# Patient Record
Sex: MALE | Race: BLACK OR AFRICAN AMERICAN | NOT HISPANIC OR LATINO | Employment: OTHER | URBAN - METROPOLITAN AREA
[De-identification: names, ages, dates, MRNs, and addresses within clinical notes are randomized per-mention and may not be internally consistent; named-entity substitution may affect disease eponyms.]

---

## 2017-02-01 ENCOUNTER — HOSPITAL ENCOUNTER (EMERGENCY)
Facility: HOSPITAL | Age: 52
Discharge: HOME | End: 2017-02-01
Attending: EMERGENCY MEDICINE
Payer: MEDICAID

## 2017-02-01 VITALS
HEART RATE: 80 BPM | OXYGEN SATURATION: 95 % | DIASTOLIC BLOOD PRESSURE: 90 MMHG | TEMPERATURE: 99 F | HEIGHT: 69 IN | WEIGHT: 240 LBS | BODY MASS INDEX: 35.55 KG/M2 | RESPIRATION RATE: 20 BRPM | SYSTOLIC BLOOD PRESSURE: 137 MMHG

## 2017-02-01 DIAGNOSIS — M25.519 SHOULDER PAIN: ICD-10-CM
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
## 2017-02-01 DIAGNOSIS — M79.602 LEFT ARM PAIN: ICD-10-CM

## 2017-02-01 PROCEDURE — 99283 EMERGENCY DEPT VISIT LOW MDM: CPT

## 2017-02-01 PROCEDURE — 99285 EMERGENCY DEPT VISIT HI MDM: CPT | Mod: ,,, | Performed by: PHYSICIAN ASSISTANT

## 2017-02-01 PROCEDURE — 25000003 PHARM REV CODE 250: Performed by: PHYSICIAN ASSISTANT

## 2017-02-01 RX ORDER — TRAMADOL HYDROCHLORIDE 50 MG/1
50 TABLET ORAL
Status: COMPLETED | OUTPATIENT
Start: 2017-02-01 | End: 2017-02-01

## 2017-02-01 RX ADMIN — TRAMADOL HYDROCHLORIDE 50 MG: 50 TABLET, FILM COATED ORAL at 11:02

## 2017-02-01 NOTE — ED AVS SNAPSHOT
[facility]  [address] [facility] [address]               Hamlet Terrell   2017  9:43 PM   ED    Description:  Male : 1965   Department:  [facility]           Your Care was Coordinated By:     Provider Role From To    Luis Brooks MD Attending Provider 17 --    Sejal Ballard PA-C Physician Assistant 17 --      Reason for Visit     Joint Swelling           Diagnoses this Visit        Comments    Fall    -  Primary     Shoulder pain         Left arm pain           ED Disposition     ED Disposition Condition Comment    Discharge             To Do List           Follow-up Information     Schedule an appointment as soon as possible for a visit with Carlin Swift MD.    Specialty:  Family Medicine    Contact information:    [address]  [facility] [address]  [phone]          Schedule an appointment as soon as possible for a visit with Ajay Ambriz - Orthopedics.    Specialty:  Orthopedics    Contact information:    [address] [facility] [address] [phone]    Additional information:    [facility] - 5th Floor      [facility]     [facility] -  Assistance  Registered nurses in the [facility] provide clinical advisement, health education, appointment booking, and other advisory services.  Call for this free service at [phone].             Medications           Message regarding Medications     Verify the changes and/or additions to your medication regime listed below are the same as discussed with your clinician today.  If any of these changes or additions are incorrect, please notify your healthcare provider.        These medications were administered today        Dose Freq    tramadol tablet 50 mg 50 mg ED 1 Time    Sig: Take 1 tablet (50 mg total) by mouth ED 1 Time.    Class: Normal    Route: Oral    Cosign for Ordering:  "Required by Luis Brooks MD           Verify that the below list of medications is an accurate representation of the medications you are currently taking.  If none reported, the list may be blank. If incorrect, please contact your healthcare provider. Carry this list with you in case of emergency.           Current Medications     aspirin 81 MG Chew Take 1 tablet (81 mg total) by mouth once daily.    carvedilol (COREG) 25 MG tablet Take 0.5 tablets (12.5 mg total) by mouth 2 (two) times daily.    hydrALAZINE (APRESOLINE) 25 MG tablet Take 1 tablet (25 mg total) by mouth every 8 (eight) hours.    isosorbide dinitrate (ISORDIL) 20 MG tablet Take 1 tablet (20 mg total) by mouth 3 (three) times daily.    nitroGLYCERIN (NITROSTAT) 0.4 MG SL tablet Place 1 tablet (0.4 mg total) under the tongue every 5 (five) minutes as needed for Chest pain. Tablet, Sublingual Sublingual    potassium chloride SA (K-DUR,KLOR-CON) 20 MEQ tablet Take by mouth once daily. Patient reports taking 25 mg daily    ranolazine (RANEXA) 1,000 mg Tb12 Take 1 tablet (1,000 mg total) by mouth 2 (two) times daily.    senna-docusate 8.6-50 mg (PERICOLACE) 8.6-50 mg per tablet Take 1 tablet by mouth daily as needed for Constipation.    torsemide (DEMADEX) 20 MG Tab Take 2 tablets (40 mg total) by mouth once daily. Patient reports taking 20 mg TWICE DAILY    tramadol tablet 50 mg Take 1 tablet (50 mg total) by mouth ED 1 Time.    warfarin (COUMADIN) 7.5 MG tablet Take 1 tablet (7.5 mg total) by mouth Daily.           Clinical Reference Information           Your Vitals Were     BP Pulse Temp Resp Height Weight    137/90 (BP Location: Right arm, Patient Position: Sitting) 80 99.3 °F (37.4 °C) (Oral) 20 5' 9" (1.753 m) 108.9 kg (240 lb)    SpO2 BMI             95% 35.44 kg/m2         Allergies as of 2/1/2017        Reactions    Acetaminophen     Itching    Oxycodone-acetaminophen     Other reaction(s): Itching    Ace Inhibitors Other (See Comments)    " cough      Immunizations Administered on Date of Encounter - 2/1/2017     None      ED Micro, Lab, POCT     None      ED Imaging Orders     Start Ordered       Status Ordering Provider    02/01/17 2216 02/01/17 2215  X-Ray Shoulder Trauma Left  1 time imaging      Final result     02/01/17 2216 02/01/17 2215  X-Ray Humerus 2 View Left  1 time imaging      Final result     02/01/17 2216 02/01/17 2215  X-Ray Elbow Complete Left  1 time imaging      Final result     02/01/17 2216 02/01/17 2215  X-Ray Forearm Left  1 time imaging      Final result         Discharge Instructions       Follow up with your PCP and orthopedics. Ice the arm to help with the swelling. Return to the ED for any new or worsening symptoms, including those listed below.        Arthralgia    Arthralgia is the term for pain in or around the joint. It is a symptom, not a disease. This pain may involve one or more joints. In some cases, the pain moves from joint to joint.  There are many causes for joint pain. These include:  · Injury  · Osteoarthritis (wearing out of the joint surface)  · Gout (inflammation of the joint due to crystals in the joint fluid)  · Infection inside the joint    · Bursitis (inflammation of the fluid-filled sacs around the joint)  · Autoimmune disorders such as rheumatoid arthritis or lupus  · Tendonitis (inflamation of chords that attach muscle to bone)  Home care  · Rest the involved joint(s) until your symptoms improve.   · You may be prescribed pain medication. If none is prescribed, you may use acetaminophen or ibuprofen to control pain and inflammation.  Follow up  Follow up with your healthcare provider or our staff as advised.  When to seek medical care  Contact your healthcare provider right away if any of the following occurs:  · Pain, swelling, or redness of joint increases  · Pain worsens or recurs after a period of improvement  · Pain moves to other joints  · You cannot bear weight on the affected joint   · You  cannot move the affected joint  · Joint appears deformed  · New rash appears  · Fever of 101ºF (38.8ºC) or higher, or as directed by your healthcare provider  © 9263-9127 [employer]. [address] [place] [address], [place] [address]. All rights reserved. This information is not intended as a substitute for professional medical care. Always follow your healthcare professional's instructions.          Muscle Strain in the Extremities  A muscle strain is a stretching and tearing of muscle fibers. This causes pain, especially when you move that muscle. There may also be some swelling and bruising.  Home care  · Keep the hurt area raised to reduce pain and swelling. This is especially important during the first 48 hours.  · Apply an ice pack over the injured area for 15 to 20 minutes every 3 to 6 hours. You should do this for the first 24 to 48 hours. You can make an ice pack by filling a plastic bag that seals at the top with ice cubes and then wrapping it with a thin towel. Be careful not to injure your skin with the ice treatments. Ice should never be applied directly to skin. Continue the use of ice packs for relief of pain and swelling as needed. After 48 hours, apply heat (warm shower or warm bath) for 15 to 20 minutes several times a day, or alternate ice and heat.  · You may use over-the-counter pain medicine to control pain, unless another medicine was prescribed. If you have chronic liver or kidney disease or ever had a stomach ulcer or GI bleeding, talk with your healthcare provider before using these medicines.  · For leg strains: If crutches have been recommended, dont put full weight on the hurt leg until you can do so without pain. You can return to sports when you are able to hop and run on the injured leg without pain.  Follow-up care  Follow up with your healthcare provider, or as advised.  When to seek medical advice  Call your healthcare provider right away if any of these occur:  · The toes  of the injured leg become swollen, cold, blue, numb, or tingly  · Pain or swelling increases  © 6261-2951 [employer] [address] [place] [address], [place] [address]. All rights reserved. This information is not intended as a substitute for professional medical care. Always follow your healthcare professional's instructions.          [facility] Sign-Up     Activating your [facility] account is as easy as 1-2-3!     1) Visit [email or web address], select Sign Up Now, enter this activation code and your date of birth, then select Next.  [email or web address]  Expires: 3/18/2017 11:30 PM      2) Create a username and password to use when you visit [facility] in the future and select a security question in case you lose your password and select Next.    3) Enter your e-mail address and click Sign Up!    Additional Information  If you have questions, please e-mail [email or web address] or call [phone] to talk to our [facility] staff. Remember, [facility] is NOT to be used for urgent needs. For medical emergencies, dial 911.          [facility] complies with applicable Federal civil rights laws and does not discriminate on the basis of race, color, national origin, age, disability, or sex.        Language Assistance Services     ATTENTION: Language assistance services are available, free of charge. Please call [phone].      ATENCIÓN: Si habla nely, tiene a hernandez disposición servicios gratuitos de asistencia lingüística. Llame al [phone].     CHÚ Ý: N?u b?n nói Ti?ng Vi?t, có các d?ch v? h? tr? ngôn ng? mi?n phí dành cho b?n. G?i s? [phone].

## 2017-02-02 NOTE — ED PROVIDER NOTES
"Encounter Date: 2/1/2017       History     Chief Complaint   Patient presents with    Joint Swelling     Patient reports falling down and using his elbow to break his fall.     Review of patient's allergies indicates:   Allergen Reactions    Acetaminophen      Itching    Oxycodone-acetaminophen      Other reaction(s): Itching    Ace inhibitors Other (See Comments)     cough     HPI Comments: 51-year-old -American male with past medical history of hypertension, CHF, paroxysmal A. fib currently on Coumadin, stroke, CAD presents to the ED complaining of left arm and shoulder pain after a fall at 6:00 in the evening.  He was walking down the street when he tripped and fell striking his left elbow on a car and then onto the concrete.  He denies any head trauma or loss of consciousness.  He is complaining of a constant 10/10 "ice pick" pain that is worse with any movement of the arm.  He has not taken any pain medication at home because his currently out his at home oxycodone.  He denies fever, chills, chest pain, shortness breath, abdominal pain, nausea, vomiting, numbness, weakness, neck pain, back pain.  He denies tobacco, alcohol, or drug use.    The history is provided by the patient.     Past Medical History   Diagnosis Date    Anticoagulant long-term use     CHF (congestive heart failure)     Chronic combined systolic and diastolic congestive heart failure     Coronary artery disease     CVA (cerebrovascular accident)     Heart attack 2006    Hypertension     Hyperthyroidism, subclinical 1/2/2013    Paroxysmal atrial fibrillation     S/P ablation of atrial flutter 2008    Stroke 2009     no residual weaknesses     Past Medical History Pertinent Negatives   Diagnosis Date Noted    Difficult intubation 1/1/2013     Past Surgical History   Procedure Laterality Date    Radiofrequency ablation  01/08/2008     for atrial flutter     Family History   Problem Relation Age of Onset    Hypertension " Mother     Stroke Mother     Hypertension Father     Alcohol abuse Father     Hypertension Sister     Hypertension Brother      Social History   Substance Use Topics    Smoking status: Never Smoker    Smokeless tobacco: None    Alcohol use No     Review of Systems   Constitutional: Negative for chills and fever.   HENT: Negative for congestion, rhinorrhea and sore throat.    Eyes: Negative for photophobia and visual disturbance.   Respiratory: Negative for shortness of breath.    Cardiovascular: Negative for chest pain.   Gastrointestinal: Negative for abdominal pain, constipation, diarrhea, nausea and vomiting.   Genitourinary: Negative for dysuria and hematuria.   Musculoskeletal: Positive for arthralgias and myalgias. Negative for neck pain and neck stiffness.   Skin: Negative for rash and wound.   Neurological: Negative for dizziness, syncope, weakness, light-headedness, numbness and headaches.   Psychiatric/Behavioral: Negative for confusion.       Physical Exam   Initial Vitals   BP Pulse Resp Temp SpO2   02/01/17 2038 02/01/17 2038 02/01/17 2038 02/01/17 2038 02/01/17 2038   137/90 80 20 99.3 °F (37.4 °C) 95 %     Physical Exam    Nursing note and vitals reviewed.  Constitutional: He appears well-developed and well-nourished. He is not diaphoretic. No distress.   HENT:   Head: Normocephalic and atraumatic.   Neck: Normal range of motion. Neck supple.   Cardiovascular: Normal rate, regular rhythm and normal heart sounds. Exam reveals no gallop and no friction rub.    No murmur heard.  Pulmonary/Chest: Breath sounds normal. He has no wheezes. He has no rhonchi. He has no rales.   Abdominal: Soft. Bowel sounds are normal. There is no tenderness. There is no rebound and no guarding.   Musculoskeletal:        Left shoulder: He exhibits decreased range of motion, tenderness, bony tenderness and swelling. He exhibits no laceration.        Left elbow: He exhibits decreased range of motion and swelling. He  exhibits no deformity and no laceration. Tenderness found.        Left wrist: He exhibits normal range of motion, no tenderness and no bony tenderness.        Cervical back: He exhibits no tenderness and no bony tenderness.        Thoracic back: He exhibits no tenderness and no bony tenderness.        Lumbar back: He exhibits no tenderness and no bony tenderness.        Left upper arm: He exhibits tenderness and bony tenderness.        Left forearm: He exhibits no tenderness and no bony tenderness.        Left hand: He exhibits normal range of motion, no tenderness, no bony tenderness and normal capillary refill.   Severely decreased active and passive ROM of the L arm secondary to pain. L radial pulse 2+.    Neurological: He is alert and oriented to person, place, and time.   Skin: Skin is warm and dry. No rash noted. No erythema.   Psychiatric: He has a normal mood and affect.         ED Course   Procedures  Labs Reviewed - No data to display     Imaging Results         X-Ray Shoulder Trauma Left (Final result) Result time:  02/01/17 23:03:20    Final result by Kelvin Partida MD (02/01/17 23:03:20)    Impression:      No acute fracture.            History: .    LEFT humerus 2 views:    No fractures or dislocations.  Unremarkable visualized bony structures.      Impression:  No acute fracture.            History: .    LEFT elbow 3 views:    No fractures or dislocations. Enthesopathy at the posterior olecranon with some overlying soft tissue swelling. No fat pad elevation.    Impression:  No acute fracture.    Enthesopathy at the posterior olecranon with some overlying soft tissue swelling.            History: .    LEFT forearm 2 views:    No fractures or dislocations.  Unremarkable visualized bony structures.      Impression:  No acute fracture.      Electronically signed by: KELVIN PARTIDA MD  Date:     02/01/17  Time:    23:03     Narrative:    History: .    LEFT shoulder 3 views:    No fractures or  dislocations.  Unremarkable visualized bony structures.            X-Ray Humerus 2 View Left (Final result) Result time:  02/01/17 23:03:19    Final result by Kelvin Partida MD (02/01/17 23:03:19)    Impression:      No acute fracture.            History: .    LEFT humerus 2 views:    No fractures or dislocations.  Unremarkable visualized bony structures.      Impression:  No acute fracture.            History: .    LEFT elbow 3 views:    No fractures or dislocations. Enthesopathy at the posterior olecranon with some overlying soft tissue swelling. No fat pad elevation.    Impression:  No acute fracture.    Enthesopathy at the posterior olecranon with some overlying soft tissue swelling.            History: .    LEFT forearm 2 views:    No fractures or dislocations.  Unremarkable visualized bony structures.      Impression:  No acute fracture.      Electronically signed by: KELVIN PARTIDA MD  Date:     02/01/17  Time:    23:03     Narrative:    History: .    LEFT shoulder 3 views:    No fractures or dislocations.  Unremarkable visualized bony structures.            X-Ray Elbow Complete Left (Final result) Result time:  02/01/17 23:03:19    Final result by Kelvin Partida MD (02/01/17 23:03:19)    Impression:      No acute fracture.            History: .    LEFT humerus 2 views:    No fractures or dislocations.  Unremarkable visualized bony structures.      Impression:  No acute fracture.            History: .    LEFT elbow 3 views:    No fractures or dislocations. Enthesopathy at the posterior olecranon with some overlying soft tissue swelling. No fat pad elevation.    Impression:  No acute fracture.    Enthesopathy at the posterior olecranon with some overlying soft tissue swelling.            History: .    LEFT forearm 2 views:    No fractures or dislocations.  Unremarkable visualized bony structures.      Impression:  No acute fracture.      Electronically signed by: KELVIN PARTIDA MD  Date:      02/01/17  Time:    23:03     Narrative:    History: .    LEFT shoulder 3 views:    No fractures or dislocations.  Unremarkable visualized bony structures.            X-Ray Forearm Left (Final result) Result time:  02/01/17 23:03:19    Final result by Kelvin Partida MD (02/01/17 23:03:19)    Impression:      No acute fracture.            History: .    LEFT humerus 2 views:    No fractures or dislocations.  Unremarkable visualized bony structures.      Impression:  No acute fracture.            History: .    LEFT elbow 3 views:    No fractures or dislocations. Enthesopathy at the posterior olecranon with some overlying soft tissue swelling. No fat pad elevation.    Impression:  No acute fracture.    Enthesopathy at the posterior olecranon with some overlying soft tissue swelling.            History: .    LEFT forearm 2 views:    No fractures or dislocations.  Unremarkable visualized bony structures.      Impression:  No acute fracture.      Electronically signed by: KELVIN PARTIDA MD  Date:     02/01/17  Time:    23:03     Narrative:    History: .    LEFT shoulder 3 views:    No fractures or dislocations.  Unremarkable visualized bony structures.              X-Rays:   Independently Interpreted Readings:   Other Readings:  Left upper extremity x-rays negative for acute fracture.    Medical Decision Making:   History:   Old Medical Records: I decided to obtain old medical records.  Clinical Tests:   Radiological Study: Ordered and Reviewed       APC / Resident Notes:   51-year-old -American male with past medical history of hypertension, CHF, paroxysmal A. fib currently on Coumadin, stroke, CAD presents to the ED complaining of left arm and shoulder pain after a fall at 6:00 in the evening.  Vital signs stable.  Regular rate and rhythm without murmurs.  Lungs are clear to auscultation bilaterally without wheezes.  Abdomen is soft and nontender to palpation.  No midline C, T, or L-spine tenderness.   Severely decreased active and passive range of motion of the left arm secondary to pain.  Diffuse bony tenderness of the left shoulder, left humerus and left elbow noted.  No obvious deformities.  No lacerations.  Left radial pulse 2+.  Will obtain x-rays to rule out acute fracture or dislocation.    X-rays of the left shoulder, left humerus, left elbow, and left forearm do not show any acute fractures or dislocations.    Left arm placed in a sling prior to discharge.  I do not feel the patient needs any further labs or imaging at this time.  Stable for discharge.    He was discharged without any new prescriptions and will follow-up with his PCP for continuation of his chronic pain medication.  He was instructed to ice the arm as needed for pain and swelling.  All of the patient's questions were answered.  I reviewed the patient's chart and imaging and discussed the case with my supervising physician.            Attending Attestation:     Physician Attestation Statement for NP/PA:   I discussed this assessment and plan of this patient with the NP/PA, but I did not personally examine the patient. The face to face encounter was performed by the NP/PA.          Attending ED Notes:   51-year-old gentleman presents with acute left elbow pain after a fall.  Imaging reviewed.  There is no acute fracture.  Patient has chronic pain on oxycodone and states that he is also out of his oxycodone prescription.  He has been advised that this prescription will not be refilled.  He was given one Ultram in the emergency department.  Her committed ice and sling as needed.  Patient was advised to watch for evidence of joint swelling or worsening hematoma as he is on Coumadin.          ED Course     Clinical Impression:   The primary encounter diagnosis was Fall. Diagnoses of Shoulder pain and Left arm pain were also pertinent to this visit.    Disposition:   Disposition: Discharged  Condition: Stable       Sejal Ballard  [place]  02/02/17 0049

## 2017-02-02 NOTE — ED NOTES
Pt states that he tripped and fell. Hit left arm. Pt is able to move left arm and shoulder. Pt rates pain at 10/10.     Appearance: Pt awake, alert & oriented to person, place & time. Pt in no acute distress at present time.   Skin: Skin warm, dry & intact. Mucous membranes moist. Skin turgor normal.   Respiratory: Respirations even, non-labored.   Neurologic: Pt moving all extremities without difficulty. Sensation intact.   Peripheral Vascular: All peripheral pulses present.

## 2017-02-02 NOTE — DISCHARGE INSTRUCTIONS
Follow up with your PCP and orthopedics. Ice the arm to help with the swelling. Return to the ED for any new or worsening symptoms, including those listed below.        Arthralgia    Arthralgia is the term for pain in or around the joint. It is a symptom, not a disease. This pain may involve one or more joints. In some cases, the pain moves from joint to joint.  There are many causes for joint pain. These include:  · Injury  · Osteoarthritis (wearing out of the joint surface)  · Gout (inflammation of the joint due to crystals in the joint fluid)  · Infection inside the joint    · Bursitis (inflammation of the fluid-filled sacs around the joint)  · Autoimmune disorders such as rheumatoid arthritis or lupus  · Tendonitis (inflamation of chords that attach muscle to bone)  Home care  · Rest the involved joint(s) until your symptoms improve.   · You may be prescribed pain medication. If none is prescribed, you may use acetaminophen or ibuprofen to control pain and inflammation.  Follow up  Follow up with your healthcare provider or our staff as advised.  When to seek medical care  Contact your healthcare provider right away if any of the following occurs:  · Pain, swelling, or redness of joint increases  · Pain worsens or recurs after a period of improvement  · Pain moves to other joints  · You cannot bear weight on the affected joint   · You cannot move the affected joint  · Joint appears deformed  · New rash appears  · Fever of 101ºF (38.8ºC) or higher, or as directed by your healthcare provider  © 2931-5983 [employer]. [address] [place] [address], [place] [id number]. All rights reserved. This information is not intended as a substitute for professional medical care. Always follow your healthcare professional's instructions.          Muscle Strain in the Extremities  A muscle strain is a stretching and tearing of muscle fibers. This causes pain, especially when you move that muscle. There may also be some  swelling and bruising.  Home care  · Keep the hurt area raised to reduce pain and swelling. This is especially important during the first 48 hours.  · Apply an ice pack over the injured area for 15 to 20 minutes every 3 to 6 hours. You should do this for the first 24 to 48 hours. You can make an ice pack by filling a plastic bag that seals at the top with ice cubes and then wrapping it with a thin towel. Be careful not to injure your skin with the ice treatments. Ice should never be applied directly to skin. Continue the use of ice packs for relief of pain and swelling as needed. After 48 hours, apply heat (warm shower or warm bath) for 15 to 20 minutes several times a day, or alternate ice and heat.  · You may use over-the-counter pain medicine to control pain, unless another medicine was prescribed. If you have chronic liver or kidney disease or ever had a stomach ulcer or GI bleeding, talk with your healthcare provider before using these medicines.  · For leg strains: If crutches have been recommended, dont put full weight on the hurt leg until you can do so without pain. You can return to sports when you are able to hop and run on the injured leg without pain.  Follow-up care  Follow up with your healthcare provider, or as advised.  When to seek medical advice  Call your healthcare provider right away if any of these occur:  · The toes of the injured leg become swollen, cold, blue, numb, or tingly  · Pain or swelling increases  © 3011-4324 [employer]. [address] [place] [address], [place] [address]. All rights reserved. This information is not intended as a substitute for professional medical care. Always follow your healthcare professional's instructions.

## 2017-04-01 ENCOUNTER — HOSPITAL ENCOUNTER (OUTPATIENT)
Facility: HOSPITAL | Age: 52
Discharge: HOME | DRG: 291 | End: 2017-04-06
Attending: EMERGENCY MEDICINE | Admitting: HOSPITALIST
Payer: MEDICAID

## 2017-04-01 DIAGNOSIS — I48.20 ATRIAL FIBRILLATION, CHRONIC: Chronic | ICD-10-CM

## 2017-04-01 DIAGNOSIS — I50.23 ACUTE ON CHRONIC SYSTOLIC CHF (CONGESTIVE HEART FAILURE): ICD-10-CM

## 2017-04-01 DIAGNOSIS — I50.9 CHRONIC HEART FAILURE, UNSPECIFIED HEART FAILURE TYPE: ICD-10-CM

## 2017-04-01 DIAGNOSIS — E80.6 HYPERBILIRUBINEMIA: ICD-10-CM

## 2017-04-01 DIAGNOSIS — R09.89 PULMONARY CONGESTION: ICD-10-CM

## 2017-04-01 DIAGNOSIS — M79.672 HEEL PAIN, BILATERAL: Chronic | ICD-10-CM

## 2017-04-01 DIAGNOSIS — I50.9 CHF (CONGESTIVE HEART FAILURE): ICD-10-CM

## 2017-04-01 DIAGNOSIS — M79.89 LEG SWELLING: Primary | ICD-10-CM

## 2017-04-01 DIAGNOSIS — M79.671 HEEL PAIN, BILATERAL: Chronic | ICD-10-CM

## 2017-04-01 DIAGNOSIS — M79.672 LEFT FOOT PAIN: ICD-10-CM

## 2017-04-01 LAB
ALBUMIN SERPL BCP-MCNC: 3.9 G/DL
ALP SERPL-CCNC: 152 U/L
ALT SERPL W/O P-5'-P-CCNC: 11 U/L
ANION GAP SERPL CALC-SCNC: 13 MMOL/L
AST SERPL-CCNC: 21 U/L
BASOPHILS # BLD AUTO: 0.05 K/UL
BASOPHILS NFR BLD: 0.6 %
BILIRUB SERPL-MCNC: 2.2 MG/DL
BNP SERPL-MCNC: 306 PG/ML
BUN SERPL-MCNC: 19 MG/DL
CALCIUM SERPL-MCNC: 9.9 MG/DL
CHLORIDE SERPL-SCNC: 97 MMOL/L
CO2 SERPL-SCNC: 32 MMOL/L
CREAT SERPL-MCNC: 1.5 MG/DL
DIFFERENTIAL METHOD: ABNORMAL
EOSINOPHIL # BLD AUTO: 0.3 K/UL
EOSINOPHIL NFR BLD: 4.4 %
ERYTHROCYTE [DISTWIDTH] IN BLOOD BY AUTOMATED COUNT: 13.1 %
EST. GFR  (AFRICAN AMERICAN): >60 ML/MIN/1.73 M^2
EST. GFR  (NON AFRICAN AMERICAN): 53.5 ML/MIN/1.73 M^2
GLUCOSE SERPL-MCNC: 95 MG/DL
HCT VFR BLD AUTO: 38 %
HGB BLD-MCNC: 12.4 G/DL
INR PPP: 1.3
LYMPHOCYTES # BLD AUTO: 1.6 K/UL
LYMPHOCYTES NFR BLD: 20.6 %
MCH RBC QN AUTO: 28.1 PG
MCHC RBC AUTO-ENTMCNC: 32.6 %
MCV RBC AUTO: 86 FL
MONOCYTES # BLD AUTO: 1.2 K/UL
MONOCYTES NFR BLD: 15.6 %
NEUTROPHILS # BLD AUTO: 4.6 K/UL
NEUTROPHILS NFR BLD: 58.5 %
PLATELET # BLD AUTO: 237 K/UL
PMV BLD AUTO: 9.9 FL
POTASSIUM SERPL-SCNC: 2.9 MMOL/L
PROT SERPL-MCNC: 9 G/DL
PROTHROMBIN TIME: 13.6 SEC
RBC # BLD AUTO: 4.42 M/UL
SODIUM SERPL-SCNC: 142 MMOL/L
TROPONIN I SERPL DL<=0.01 NG/ML-MCNC: 0.09 NG/ML
WBC # BLD AUTO: 7.78 K/UL

## 2017-04-01 PROCEDURE — 63600175 PHARM REV CODE 636 W HCPCS: Performed by: EMERGENCY MEDICINE

## 2017-04-01 PROCEDURE — 80053 COMPREHEN METABOLIC PANEL: CPT

## 2017-04-01 PROCEDURE — 99285 EMERGENCY DEPT VISIT HI MDM: CPT | Mod: 25

## 2017-04-01 PROCEDURE — 85610 PROTHROMBIN TIME: CPT

## 2017-04-01 PROCEDURE — 85025 COMPLETE CBC W/AUTO DIFF WBC: CPT

## 2017-04-01 PROCEDURE — G0378 HOSPITAL OBSERVATION PER HR: HCPCS

## 2017-04-01 PROCEDURE — 84484 ASSAY OF TROPONIN QUANT: CPT

## 2017-04-01 PROCEDURE — 83880 ASSAY OF NATRIURETIC PEPTIDE: CPT

## 2017-04-01 PROCEDURE — 93005 ELECTROCARDIOGRAM TRACING: CPT

## 2017-04-01 PROCEDURE — 96374 THER/PROPH/DIAG INJ IV PUSH: CPT

## 2017-04-01 PROCEDURE — 25000003 PHARM REV CODE 250: Performed by: EMERGENCY MEDICINE

## 2017-04-01 PROCEDURE — 93010 ELECTROCARDIOGRAM REPORT: CPT | Mod: ,,, | Performed by: INTERNAL MEDICINE

## 2017-04-01 PROCEDURE — 11000001 HC ACUTE MED/SURG PRIVATE ROOM

## 2017-04-01 PROCEDURE — 99285 EMERGENCY DEPT VISIT HI MDM: CPT | Mod: ,,, | Performed by: EMERGENCY MEDICINE

## 2017-04-01 PROCEDURE — 87040 BLOOD CULTURE FOR BACTERIA: CPT

## 2017-04-01 RX ORDER — NAPROXEN 500 MG/1
500 TABLET ORAL
Status: DISCONTINUED | OUTPATIENT
Start: 2017-04-01 | End: 2017-04-01

## 2017-04-01 RX ORDER — FUROSEMIDE 10 MG/ML
40 INJECTION INTRAMUSCULAR; INTRAVENOUS
Status: COMPLETED | OUTPATIENT
Start: 2017-04-01 | End: 2017-04-01

## 2017-04-01 RX ORDER — TRAMADOL HYDROCHLORIDE 50 MG/1
50 TABLET ORAL
Status: COMPLETED | OUTPATIENT
Start: 2017-04-01 | End: 2017-04-01

## 2017-04-01 RX ADMIN — FUROSEMIDE 40 MG: 10 INJECTION, SOLUTION INTRAMUSCULAR; INTRAVENOUS at 11:04

## 2017-04-01 RX ADMIN — TRAMADOL HYDROCHLORIDE 50 MG: 50 TABLET, COATED ORAL at 11:04

## 2017-04-01 NOTE — IP AVS SNAPSHOT
[facility]  [address]  [place] [address]  Phone: [phone]           Patient Discharge Instructions     Our goal is to set you up for success. This packet includes information on your condition, medications, and your home care. It will help you to care for yourself so you don't get sicker and need to go back to the hospital.     Please ask your nurse if you have any questions.        There are many details to remember when preparing to leave the hospital. Here is what you will need to do:    1. Take your medicine. If you are prescribed medications, review your Medication List in the following pages. You may have new medications to  at the pharmacy and others that you'll need to stop taking. Review the instructions for how and when to take your medications. Talk with your doctor or nurses if you are unsure of what to do.     2. Go to your follow-up appointments. Specific follow-up information is listed in the following pages. Your may be contacted by a transition nurse or clinical provider about future appointments. Be sure we have all of the phone numbers to reach you, if needed. Please contact your provider's office if you are unable to make an appointment.     3. Watch for warning signs. Your doctor or nurse will give you detailed warning signs to watch for and when to call for assistance. These instructions may also include educational information about your condition. If you experience any of warning signs to your health, call your doctor.           [facility]  Unless otherwise directed by your provider, please   contact [facility], our nurse care line   that is available for 24/7 assistance.     [phone] (toll-free)     Registered nurses in the [facility]   provide: appointment scheduling, clinical advisement, health education, and other advisory services.                  ** Verify the list of medication(s) below is accurate and  up to date. Carry this with you in case of emergency. If your medications have changed, please notify your healthcare provider.             Medication List      START taking these medications        Additional Info                      gabapentin 400 MG capsule   Commonly known as:  NEURONTIN   Quantity:  60 capsule   Refills:  2   Dose:  400 mg    Last time this was given:  400 mg on 4/6/2017 11:40 AM   Instructions:  Take 1 capsule (400 mg total) by mouth 2 (two) times daily.     Begin Date    AM    Noon    PM    Bedtime       losartan 25 MG tablet   Commonly known as:  COZAAR   Quantity:  15 tablet   Refills:  2   Dose:  12.5 mg    Last time this was given:  12.5 mg on 4/6/2017 11:42 AM   Instructions:  Take 0.5 tablets (12.5 mg total) by mouth once daily.     Begin Date    AM    Noon    PM    Bedtime       methylPREDNISolone 4 mg tablet   Commonly known as:  MEDROL DOSEPACK   Quantity:  1 Package   Refills:  0   Indications:  Bursitis    Instructions:  use as directed     Begin Date    AM    Noon    PM    Bedtime         CHANGE how you take these medications        Additional Info                      torsemide 20 MG Tab   Commonly known as:  DEMADEX   Quantity:  120 tablet   Refills:  2   Dose:  40 mg   What changed:    - when to take this  - additional instructions    Last time this was given:  40 mg on 4/6/2017 11:46 AM   Instructions:  Take 2 tablets (40 mg total) by mouth 2 (two) times daily.     Begin Date    AM    Noon    PM    Bedtime       warfarin 7.5 MG tablet   Commonly known as:  COUMADIN   Quantity:  45 tablet   Refills:  0   Dose:  12.5 mg   What changed:  how much to take    Last time this was given:  10 mg on 4/4/2017  6:00 PM   Instructions:  Take 1.5 tablets (11.25 mg total) by mouth Daily.     Begin Date    AM    Noon    PM    Bedtime         CONTINUE taking these medications        Additional Info                      aspirin 81 MG Chew   Refills:  0   Dose:  81 mg    Last time this was  given:  81 mg on 4/6/2017 11:40 AM   Instructions:  Take 1 tablet (81 mg total) by mouth once daily.     Begin Date    AM    Noon    PM    Bedtime       carvedilol 25 MG tablet   Commonly known as:  COREG   Quantity:  60 tablet   Refills:  1   Dose:  12.5 mg    Last time this was given:  12.5 mg on 4/6/2017 11:40 AM   Instructions:  Take 0.5 tablets (12.5 mg total) by mouth 2 (two) times daily.     Begin Date    AM    Noon    PM    Bedtime       hydrALAZINE 25 MG tablet   Commonly known as:  APRESOLINE   Quantity:  270 tablet   Refills:  3   Dose:  25 mg    Last time this was given:  25 mg on 4/6/2017  3:00 PM   Instructions:  Take 1 tablet (25 mg total) by mouth every 8 (eight) hours.     Begin Date    AM    Noon    PM    Bedtime       isosorbide dinitrate 20 MG tablet   Commonly known as:  ISORDIL   Quantity:  270 tablet   Refills:  3   Dose:  20 mg    Last time this was given:  20 mg on 4/6/2017  3:00 PM   Instructions:  Take 1 tablet (20 mg total) by mouth 3 (three) times daily.     Begin Date    AM    Noon    PM    Bedtime       nitroGLYCERIN 0.4 MG SL tablet   Commonly known as:  NITROSTAT   Quantity:  30 tablet   Refills:  11   Dose:  0.4 mg    Instructions:  Place 1 tablet (0.4 mg total) under the tongue every 5 (five) minutes as needed for Chest pain. Tablet, Sublingual Sublingual     Begin Date    AM    Noon    PM    Bedtime       potassium chloride SA 20 MEQ tablet   Commonly known as:  K-DUR,KLOR-CON   Refills:  0    Instructions:  Take by mouth once daily. Patient reports taking 25 mg daily     Begin Date    AM    Noon    PM    Bedtime            Where to Get Your Medications      These medications were sent to [facility] [address] [place] [address] AT [address] [address]    Hours:  24-hours Phone:  [phone]     gabapentin 400 MG capsule    losartan 25 MG tablet    methylPREDNISolone 4 mg tablet    torsemide  20 MG Tab    warfarin 7.5 MG tablet                  Please bring to all follow up appointments:    1. A copy of your discharge instructions.  2. All medicines you are currently taking in their original bottles.  3. Identification and insurance card.    Please arrive 15 minutes ahead of scheduled appointment time.    Please call 24 hours in advance if you must reschedule your appointment and/or time.        Follow-up Information     Follow up with [facility].    Specialty:  Emergency Medicine    Why:  If symptoms worsen    Contact information:    [address] [place] [address] [phone]        Schedule an appointment as soon as possible for a visit with Carlin Swift MD.    Specialty:  Family Medicine    Contact information:    [address] [facility] [address] [phone]          Schedule an appointment as soon as possible for a visit with Ajay Ambriz - Cardiology.    Specialty:  Cardiology    Contact information:    [address] [place] [address] [phone]    Additional information:    3rd floor        Schedule an appointment as soon as possible for a visit with Ajay Ambriz - Wound Care.    Specialty:  Wound Care    Contact information:    [address] [place] [address] [phone]    Additional information:    8th Floor        Follow up with Carlin Swift MD In 1 week.    Specialty:  Family Medicine    Why:  Please see your Primary care in 1 week for f/u and check of your INR and hospital follow up    Contact information:    [address] [facility] [address] [phone]        Referrals     Future Orders    Ambulatory consult to Family Practice     Ambulatory Referral to Cardiology     Ambulatory Referral to Podiatry     Ambulatory Referral to Transplant, Heart     Ambulatory Referral to Transplant, Heart         Discharge Instructions     Future Orders    Call MD for:  difficulty  breathing or increased cough     Call MD for:  increased confusion or weakness     Call MD for:  persistent dizziness, light-headedness, or visual disturbances     Call MD for:  persistent nausea and vomiting or diarrhea     Call MD for:  redness, tenderness, or signs of infection (pain, swelling, redness, odor or green/yellow discharge around incision site)     Call MD for:  severe persistent headache     Call MD for:  severe uncontrolled pain     Call MD for:  temperature >100.4     Call MD for:  worsening rash     Diet general     Questions:    Total calories:      Fat restriction, if any:      Protein restriction, if any:      Na restriction, if any:      Fluid restriction:      Additional restrictions:          Discharge Instructions       1. For you acute on chronic heart failure exacerbation you will be discharged with a new dose of torsemide 40mg by mouth twice a day    2. For your heel pain, you were seen by the podiatry doctors, you had an MRI that did not reveal structural damage or abnormality.  You will receive a new prescription Gabapentin for chronic foot/heel pain.  Podiatry to provide left walking boot.   Also they recommended a Medrol Dosepack to treat a left heel bursitis                Leg Swelling in Both Legs    Swelling of the feet, ankles, and legs is called edema. It is caused by excess fluid that has collected in the tissues. Extra fluid in the body settles in the lowest part because of gravity. This is why the legs and feet are most affected.  Some of the causes for edema include:  · Disease of the heart like congestive heart failure  · Standing or sitting for long periods of time  · Infection of the feet or legs  · Blood pooling in the veins of your legs (venous insufficiency)  · Dilated veins in your lower leg (varicose veins)  · Garters or other clothing that is tight on your legs. This will cause blood to pool in your legs because the clothing limits blood flow.  · Some medicines such  as hormones like birth control pills, some blood pressure medicines like calcium channel blockers (amlodipine) and steroids, some antidepressants like MAO inhibitors and tricyclics  · Menstrual periods that cause you to retain fluids  · Many types of renal disease  · Liver failure or cirrhosis  · Pregnancy, some swelling is normal, but a sudden increase in leg swelling or weight gain can be a sign of a dangerous complication of pregnancy  · Poor nutrition  · Thyroid disease  Medical treatment will depend on what is causing the swelling in your legs. Your healthcare provider may prescribe water pills (diuretics) to get rid of the extra fluid.  Home care  Follow these guidelines when caring for yourself at home:  · Don't wear clothing like garters that is tight on your legs.  · Keep your legs up while lying or sitting.  · If infection, injury, or recent surgery is causing the swelling, stay off your legs as much as possible until symptoms get better.  · If your healthcare provider says that your leg swelling is caused by venous insufficiency or varicose veins, don't sit or  one place for long periods of time. Take breaks and walk about every few hours. Brisk walking is a good exercise. It helps circulate the blood that has collected in your leg. Talk with your provider about using support stockings to stop daytime leg swelling.  · If your provider says that heart disease is causing your leg swelling, follow a low-salt diet to stop extra fluid from staying in your body. You may also need medicine.  Follow-up care  Follow up with your healthcare provider, or as advised.  When to seek medical advice  Call your healthcare provider right away if any of these occur:  · New shortness of breath or chest pain  · Shortness of breath or chest pain that gets worse  · Swelling in both legs or ankles that gets worse  · Swelling of the abdomen  · Redness, warmth, or swelling in one leg  · Fever of 100.4ºF (38ºC) or higher,  or as directed by your healthcare provider  · Yellow color to your skin or eyes  · Rapid, unexplained weight gain  · Having to sleep upright or use an increased number of pillows  Date Last Reviewed: 3/31/2016  © 0671-2485 [employer]. [address] [place] [address], [place] [address]. All rights reserved. This information is not intended as a substitute for professional medical care. Always follow your healthcare professional's instructions.          Heart Failure: Warning Signs of a Flare-Up  You have a condition called heart failure. Once you have heart failure, flare-ups can happen. Below are signs that can mean your heart failure is getting worse. If you notice any of these warning signs, call your healthcare provider.  Swelling    · Your feet, ankles, or lower legs get puffier.  · You notice skin changes on your lower legs.  · Your shoes feel too tight.  · Your clothes are tighter in the waist.  · You have trouble getting rings on or off your fingers.  Shortness of breath  · You have to breathe harder even when youre doing your normal activities or when youre resting.  · You are short of breath walking up stairs or even short distances.  · You wake up at night short of breath or coughing.  · You need to use more pillows or sit up to sleep.  · You wake up tired or restless.  Other warning signs  · You feel weaker, dizzy, or more tired.  · You have chest pain or changes in your heartbeat.  · You have a cough that wont go away.  · You cant remember things or dont feel like eating.  Tracking your weight  Gaining weight is often the first warning sign that heart failure is getting worse. Gaining even a few pounds can be a sign that your body is retaining excess water and salt. Weighing yourself each day in the morning after you urinate and before you eat, is the best way to know if you're retaining water. Get a scale that is easy to read and make sure you wear the same clothes and use the same scale  "every time you weigh. Your healthcare provider will show you how to track your weight. Call your doctor if you gain more than 2 pounds in 1 day, 5 pounds in 1 week, or whatever weight gain you were told to report by your doctor. This is often a sign of worsening heart failure and needs to be evaluated and treated before it compromises your breathing. Your doctor will tell you what to do next.    Date Last Reviewed: 3/15/2016  © 5470-1126 [employer]. [address] [place] [address]. All rights reserved. This information is not intended as a substitute for professional medical care. Always follow your healthcare professional's instructions.          Primary Diagnosis     Your primary diagnosis was:  Heart Failure      Admission Information     Date & Time Provider Department CSN    4/1/2017  8:41 PM José Luis Langford MD [facility] [id number]       Admisson Diagnosis: Pulmonary congestion, Hyperbilirubinemia, CHF (congestive heart failure), Leg swelling, Chronic heart failure, unspecified heart failure type, Leg swelling      Care Providers     Provider Role Specialty Primary office phone    José Luis Langford MD Attending Provider Hospitalist [phone]    José Luis Langford MD Team Attending  Hospitalist [phone]    Aline George MD Team Attending  Hospitalist [phone]      Your Vitals Were     BP Pulse Temp Resp Height Weight    138/79 (BP Location: Left arm, Patient Position: Sitting, BP Method: Automatic) 90 98.1 °F (36.7 °C) (Oral) 18 5' 9" (1.753 m) 108.4 kg (239 lb 1.4 oz)    SpO2 BMI             95% 35.31 kg/m2         Recent Lab Values        2/11/2007 10/17/2015                        3:25 AM  3:22 AM          A1C 6.3 (H) 6.4 (H)                     Pending Labs     Order Current Status    Blood Culture #1 **CANNOT BE ORDERED STAT** Preliminary result      Allergies as of 4/6/2017        Reactions    Acetaminophen     Itching    " Oxycodone-acetaminophen     Other reaction(s): Itching    Ace Inhibitors Other (See Comments)    cough      Advance Directives     An advance directive is a document which, in the event you are no longer able to make decisions for yourself, tells your healthcare team what kind of treatment you do or do not want to receive, or who you would like to make those decisions for you.  If you do not currently have an advance directive, [facility] encourages you to create one.  For more information call:  [phone]), [phone] ([phone]), or log on to [email or web address].        Translation Services Information     ATTENTION: Language assistance services are available, free of charge. Please call [phone].    ATENCIÓN: Si habla nely, tiene a hernandez disposición servicios gratuitos de asistencia lingüística. Llame al [phone].     CHÚ Ý: N?u b?n nói Ti?ng Vi?t, có các d?ch v? h? tr? ngôn ng? mi?n phí dành cho b?n. G?i s? [phone].        Heart Failure Education       Heart Failure: Being Active  You have a condition called heart failure. Being active doesnt mean that you have to wear yourself out. Even a little movement each day helps to strengthen your heart. If you cant get out to exercise, you can do simple stretching and strengthening exercises at home. These are good ways to keep you well-conditioned and prevent you and your heart from becoming excessively weak.    Ideas to get you started  · Add a little movement to things you do now. Walk to mail letters. Park your car at the far end of the parking lot and walk to the store. Walk up a flight of stairs instead of taking the elevator.  · Choose activities you enjoy. You might walk, swim, or ride an exercise bike. Things like gardening and washing the car count, too. Other possibilities include: washing dishes, walking the dog, walking around the mall, and doing aerobic activities with friends.  · Join a group exercise program  at a [facility] or [facility], a senior center, or a community center. Or look into a hospital cardiac rehabilitation program. Ask your doctor if you qualify.  Tips to keep you going  · Get up and get dressed each day. Go to a coffee shop and read a newspaper or go somewhere that you'll be in the presence of other active people. Youll feel more like being active.  · Make a plan. Choose one or more activities that you enjoy and that you can easily do. Then plan to do at least one each day. You might write your plan on a calendar.  · Go with a friend or a group if you like company. This can help you feel supported and stay motivated, too.  · Plan social events that you enjoy. This will keep you mentally engaged as well as physically motivated to do things you find pleasure in.  For your safety  · Talk with your healthcare provider before starting an exercise program.  · Exercise indoors when its too hot or too cold outside, or when the air quality is poor. Try walking at a shopping mall.  · Wear socks and sturdy shoes to maintain your balance and prevent falls.  · Start slowly. Do a few minutes several times a day at first. Increase your time and speed little by little.  · Stop and rest whenever you feel tired or get short of breath.  · Dont push yourself on days when you dont feel well.  Date Last Reviewed: 3/20/2016  © 9878-7714 [employer]. [address] [place] [address], [place] [address]. All rights reserved. This information is not intended as a substitute for professional medical care. Always follow your healthcare professional's instructions.              Heart Failure: Evaluating Your Heart  You have a condition called heart failure. To evaluate your condition, your doctor will examine you, ask questions, and do some tests. Along with looking for signs of heart failure, the doctor looks for any other health problems that may have led to heart failure. The results of your evaluation will help your doctor form a  treatment plan.  Health history and physical exam  Your visit will start with a health history. Tell the doctor about any symptoms youve noticed and about all medicines you take. Then youll have a physical exam. This includes listening to your heartbeat and breathing. Youll also be checked for swelling (edema) in your legs and neck. When you have fluid buildup or fluid in the lungs, it may be called congestive heart failure.  Diagnosing heart failure     During an echocardiogram, sound waves bounce off the heart. These are converted into a picture on the screen.   The following may be done to help your doctor form a diagnosis:  · X-rays show the size and shape of your heart. These pictures can also show fluid in your lungs.  · An electrocardiogram (ECG or EKG) shows the pattern of your heartbeat. Small pads (electrodes) are placed on your chest, arms, and legs. Wires connect the pads to the ECG machine, which records your hearts electrical signals. This can give the doctor information about heart function.  · An echocardiogram uses ultrasound waves to show the structure and movement of your heart muscle. This shows how well the heart pumps. It also shows the thickness of the heart walls, and if the heart is enlarged. It is one of the most useful, non-invasive tests as it provides information about the heart's general function. This helps your doctor make treatment decisions.  · Lab tests evaluate small amounts of blood or urine for signs of problems. A BNP lab test can help diagnose and evaluate heart failure. BNP stands for B-type natriuretic peptide. The ventricles secrete more BNP when heart failure worsens. Lab tests can also provide information about metabolic dysfunction or heart dysfunction.  Your treatment plan  Based on the results of your evaluation and tests, your doctor will develop a treatment plan. This plan is designed to relieve some of your heart failure symptoms and help make you more  comfortable. Your treatment plan may include:  · Medicine to help your heart work better and improve your quality of life  · Changes in what you eat and drink to help prevent fluid from backing up in your body  · Daily monitoring of your weight and heart failure symptoms to see how well your treatment plan is working  · Exercise to help you stay healthy  · Help with quitting smoking  · Emotional and psychological support to help adjust to the changes  · Referrals to other specialists to make sure you are being treated comprehensively  Date Last Reviewed: 3/21/2016  © 2774-5339 [employer]. [address] [place] [address]. All rights reserved. This information is not intended as a substitute for professional medical care. Always follow your healthcare professional's instructions.              Heart Failure: Making Changes to Your Diet  You have a condition called heart failure. When you have heart failure, excess fluid is more likely to build up in your body because your heart isn't working well. This makes the heart work harder to pump blood. Fluid buildup causes symptoms such as shortness of breath and swelling (edema). This is often referred to as congestive heart failure or CHF. Controlling the amount of salt (sodium) you eat may help stop fluid from building up. Your doctor may also tell you to reduce the amount of fluid you drink.  Reading food labels    Your healthcare provider will tell you how much sodium you can eat each day. Read food labels to keep track. Keep in mind that certain foods are high in salt. These include canned, frozen, and processed foods. Check the amount of sodium in each serving. Watch out for high-sodium ingredients. These include MSG (monosodium glutamate), baking soda, and sodium phosphate.   Eating less salt  Give yourself time to get used to eating less salt. It may take a little while. Here are some tips to help:  · Take the saltshaker off the table. Replace  it with salt-free herb mixes and spices.  · Eat fresh or plain frozen vegetables. These have much less salt than canned vegetables.  · Choose low-sodium snacks like sodium-free pretzels, crackers, or air-popped popcorn.  · Dont add salt to your food when youre cooking. Instead, season your foods with pepper, lemon, garlic, or onion.  · When you eat out, ask that your food be cooked without added salt.  · Avoid eating fried foods as these often have a great deal of salt.  If youre told to limit fluids  You may need to limit how much fluid you have to help prevent swelling. This includes anything that is liquid at room temperature, such as ice cream and soup. If your doctor tells you to limit fluid, try these tips:  · Measure drinks in a measuring cup before you drink them. This will help you meet daily goals.  · Chill drinks to make them more refreshing.  · Suck on frozen lemon wedges to quench thirst.  · Only drink when youre thirsty.  · Chew sugarless gum or suck on hard candy to keep your mouth moist.  · Weigh yourself daily to know if your body's fluid content is rising.  My sodium goal  Your healthcare provider may give you a sodium goal to meet each day. This includes sodium found in food as well as salt that you add. My goal is to eat no more than ___________ mg of sodium per day.     When to call your doctor  Call your doctor right away if you have any symptoms of worsening heart failure. These can include:  · Sudden weight gain  · Increased swelling of your legs or ankles  · Trouble breathing when youre resting or at night  · Increase in the number of pillows you have to sleep on  · Chest pain, pressure, discomfort, or pain in the jaw, neck, or back   Date Last Reviewed: 3/21/2016  © 7686-8828 [employer]. [address] [place] [address], [place] [address]. All rights reserved. This information is not intended as a substitute for professional medical care. Always follow your healthcare  professional's instructions.              Heart Failure: Medicines to Help Your Heart    You have a condition called heart failure (also known as congestive heart failure, or CHF). Your doctor will likely prescribe medicines for heart failure and any underlying health problems you have. Most heart failure patients take one or more types of medicinen. Your healthcare provider will work to find the combination of medicines that works best for you.  Heart failure medicines  Here are the most common heart failure medicines:  · ACE inhibitors lower blood pressure and decrease strain on the heart. This makes it easier for the heart to pump. Angiotensin receptor blockers have similar effects. These are prescribed for some patients instead of ACE inhibitors.  · Beta-blockers relieve stress on the heart. They also improve symptoms. They may also improve the heart's pumping action over time.  · Diuretics (also called water pills) help rid your body of excess water. This can help rid your body of swelling (edema). Having less fluid to pump means your heart doesnt have to work as hard. Some diuretics make your body lose a mineral called potassium. Your doctor will tell you if you need to take supplements or eat more foods high in potassium.  · Digoxin helps your heart pump with more strength. This helps your heart pump more blood with each beat. So, more oxygen-rich blood travels to the rest of the body.  · Aldosterone antagonists help alter hormones and decrease strain on the heart.  · Hydralazine and nitrates are two separate medicines used together to treat heart failure. They may come in one combination pill. They lower blood pressure and decrease how hard the heart has to pump.  Medicines for related conditions  Controlling other heart problems helps keep heart failure under control, too. Depending on other heart problems you have, medicines may be prescribed to:  · Lower blood pressure (antihypertensives).  · Lower  cholesterol levels (statins).  · Prevent blood clots (anticoagulants or aspirin).  · Keep the heartbeat steady (antiarrhythmics).  Date Last Reviewed: 3/5/2016  © 8128-6884 [employer]. [address] [place] [address], [place] [address]. All rights reserved. This information is not intended as a substitute for professional medical care. Always follow your healthcare professional's instructions.              Heart Failure: Procedures That May Help    The heart is a muscle that pumps oxygen-rich blood to all parts of the body. When you have heart failure, the heart is not able to pump as well as it should. Blood and fluid may back up into the lungs (congestive heart failure), and some parts of the body dont get enough oxygen-rich blood to work normally. These problems lead to the symptoms of heart failure.     Certain procedures may help the heart pump better in some cases of heart failure. Some procedures are done to treat health problems that may have caused the heart failure such as coronary artery disease or heart rhythm problems. For more serious heart failure, other options are available.  Treating artery and valve problems  If you have coronary artery disease or valve disease, procedures may be done to improve blood flow. This helps the heart pump better, which can improve heart failure symptoms. First, your doctor may do a cardiac catheterization to help detect clogged blood vessels or valve damage. During this procedure, a  thin tube (catheter) in inserted into a blood vessel and guided to the heart. There a dye is injected and a special type of X-ray (angiogram) is taken of the blood vessels. Procedures to open a blocked artery or fix damaged valves can also be done using catheterization.  · Angioplasty uses a balloon-tipped instrument at the end of the catheter. The balloon is inflated to widen the narrowed artery. In many cases, a stent is expanded to further support the narrowed artery. A stent is a  metal mesh tube.  · Valve surgery repairs or replacement of faulty valves can also be done during catheterization so blood can flow properly through the chambers of the heart.  Bypass surgery is another option to help treat blocked arteries. It uses a healthy blood vessel from elsewhere in the body. The healthy blood vessel is attached above and below the blocked area so that blood can flow around the blocked artery.  Treating heart rhythm problems  A device may be placed in the chest to help a weak heart maintain a healthy, heartbeat so the heart can pump more effectively:  · Pacemaker. A pacemaker is an implanted device that regulates your heartbeat electronically. It monitors your heart's rhythm and generates a painless electric impulse that helps the heart beat in a regular rhythm. A pacemaker is programmed to meet your specific heart rhythm needs.  · Biventricular pacing/cardiac resynchronization therapy. A type of pacemaker that paces both pumping chambers of the heart at the same time to coordinate contractions and to improve the heart's function. Some people with heart failure are candidates for this therapy.  · Implantable cardioverter defibrillator. A device similar to a pacemaker that senses when the heart is beating too fast and delivers an electrical shock to convert the fast rhythm to a normal rhythm. This can be a life saving device.  In severe cases  In more serious cases of heart failure when other treatments no longer work, other options may include:  · Ventricular assist devices (VADs). These are mechanical devices used to take over the pumping function for one or both of the heart's ventricles, or pumping chambers. A VAD may be necessary when heart failure progresses to the point that medicines and other treatments no longer help. In some cases, a VAD may be used as a bridge to transplant.  · Heart transplant. This is replacing the diseased heart with a healthy one from a donor. This is an option  for a few people who are very sick. A heart transplant is very serious and not an option for all patients. Your doctor can tell you more.  Date Last Reviewed: 3/20/2016  © 5838-1788 [employer]. [address] [place] [address], [place] [address]. All rights reserved. This information is not intended as a substitute for professional medical care. Always follow your healthcare professional's instructions.              Heart Failure: Tracking Your Weight  You have a condition called heart failure. When you have heart failure, a sudden weight gain or a steady rise in weight is a warning sign that your body is retaining too much water and salt. This could mean your heart failure is getting worse. If left untreated, it can cause problems for your lungs and result in shortness of breath. Weighing yourself each day is the best way to know if youre retaining water. If your weight goes up quickly, call your doctor. You will be given instructions on how to get rid of the excess water. You will likely need medicines and to avoid salt. This will help your heart work better.  Call your doctor if you gain more than 2 pounds in 1 day, more than 5 pounds in 1 week, or whatever weight gain you were told to report by your doctor. This is often a sign of worsening heart failure and needs to be evaluated and treated. Your doctor will tell you what to do next.   Tips for weighing yourself    · Weigh yourself at the same time each morning, wearing the same clothes. Weigh yourself after urinating and before eating.  · Use the same scale each day. Make sure the numbers are easy to read. Put the scale on a flat, hard surface -- not on a rug or carpet.  · Do not stop weighing yourself. If you forget one day, weigh again the next morning.  How to use your weight chart  · Keep your weight chart near the scale. Write your weight on the chart as soon as you get off the scale.  · Fill in the month and the start date on the chart. Then write  down your weight each day. Your chart will look like this:    · If you miss a day, leave the space blank. Weigh yourself the next day and write your weight in the next space.  · Take your weight chart with you when you go to see your doctor.  Date Last Reviewed: 3/20/2016  © 0084-7490 [employer]. [address] [place] [address], [place] [address]. All rights reserved. This information is not intended as a substitute for professional medical care. Always follow your healthcare professional's instructions.              Heart Failure: Warning Signs of a Flare-Up  You have a condition called heart failure. Once you have heart failure, flare-ups can happen. Below are signs that can mean your heart failure is getting worse. If you notice any of these warning signs, call your healthcare provider.  Swelling    · Your feet, ankles, or lower legs get puffier.  · You notice skin changes on your lower legs.  · Your shoes feel too tight.  · Your clothes are tighter in the waist.  · You have trouble getting rings on or off your fingers.  Shortness of breath  · You have to breathe harder even when youre doing your normal activities or when youre resting.  · You are short of breath walking up stairs or even short distances.  · You wake up at night short of breath or coughing.  · You need to use more pillows or sit up to sleep.  · You wake up tired or restless.  Other warning signs  · You feel weaker, dizzy, or more tired.  · You have chest pain or changes in your heartbeat.  · You have a cough that wont go away.  · You cant remember things or dont feel like eating.  Tracking your weight  Gaining weight is often the first warning sign that heart failure is getting worse. Gaining even a few pounds can be a sign that your body is retaining excess water and salt. Weighing yourself each day in the morning after you urinate and before you eat, is the best way to know if you're retaining water. Get a scale that is easy to read  and make sure you wear the same clothes and use the same scale every time you weigh. Your healthcare provider will show you how to track your weight. Call your doctor if you gain more than 2 pounds in 1 day, 5 pounds in 1 week, or whatever weight gain you were told to report by your doctor. This is often a sign of worsening heart failure and needs to be evaluated and treated before it compromises your breathing. Your doctor will tell you what to do next.    Date Last Reviewed: 3/15/2016  © 7812-7531 [employer]. [address] [place] [address]. All rights reserved. This information is not intended as a substitute for professional medical care. Always follow your healthcare professional's instructions.              Coumadin Discharge Instructions                         Chronic [facility] Disease Education             [facility] Sign-Up     Activating your [facility] account is as easy as 1-2-3!     1) Visit [email or web address], select Sign Up Now, enter this activation code and your date of birth, then select Next.  [email or web address]  Expires: 5/21/2017  3:03 PM      2) Create a username and password to use when you visit [facility] in the future and select a security question in case you lose your password and select Next.    3) Enter your e-mail address and click Sign Up!    Additional Information  If you have questions, please e-mail [email or web address] or call [phone] to talk to our [facility] staff. Remember, [facility] is NOT to be used for urgent needs. For medical emergencies, dial 911.          [facility] complies with applicable Federal civil rights laws and does not discriminate on the basis of race, color, national origin, age, disability, or sex.

## 2017-04-01 NOTE — IP AVS SNAPSHOT
[facility]  [address]  [facility] [address]  Phone: [phone]           Patient Discharge Instructions   Our goal is to set you up for success. This packet includes information on your condition, medications, and your home care.  It will help you care for yourself to prevent having to return to the hospital.     Please ask your nurse if you have any questions.      There are many details to remember when preparing to leave the hospital. Here is what you will need to do:    1. Take your medicine. If you are prescribed medications, review your Medication List on the following pages. You may have new medications to  at the pharmacy and others that you'll need to stop taking. Review the instructions for how and when to take your medications. Talk with your doctor or nurses if you are unsure of what to do.     2. Go to your follow-up appointments. Specific follow-up information is listed in the following pages. Your may be contacted by a nurse or clinical provider about future appointments. Be sure we have all of the phone numbers to reach you. Please contact your provider's office if you are unable to make an appointment.     3. Watch for warning signs. Your doctor or nurse will give you detailed warning signs to watch for and when to call for assistance. These instructions may also include educational information about your condition. If you experience any of warning signs to your health, call your doctor.           [facility]  Unless otherwise directed by your provider, please   contact [facility], our nurse care line   that is available for 24/7 assistance.     [phone] (toll-free)     Registered nurses in the [facility]   provide: appointment scheduling, clinical advisement, health education, and other advisory services.                  ** Verify the list of medication(s) below is accurate and up to date. Carry this with you in case of  emergency. If your medications have changed, please notify your healthcare provider.             Medication List      START taking these medications        Additional Info                      gabapentin 400 MG capsule   Commonly known as:  NEURONTIN   Quantity:  60 capsule   Refills:  2   Dose:  400 mg    Last time this was given:  400 mg on 4/6/2017 11:40 AM   Instructions:  Take 1 capsule (400 mg total) by mouth 2 (two) times daily. For chronic heel/foot pain     Begin Date    AM    Noon    PM    Bedtime       losartan 25 MG tablet   Commonly known as:  COZAAR   Quantity:  15 tablet   Refills:  2   Dose:  12.5 mg    Last time this was given:  12.5 mg on 4/6/2017 11:42 AM   Instructions:  Take 0.5 tablets (12.5 mg total) by mouth once daily.     Begin Date    AM    Noon    PM    Bedtime       methylPREDNISolone 4 mg tablet   Commonly known as:  MEDROL DOSEPACK   Quantity:  1 Package   Refills:  0   Indications:  Bursitis    Instructions:  use as directed     Begin Date    AM    Noon    PM    Bedtime         CHANGE how you take these medications        Additional Info                      * hydrALAZINE 25 MG tablet   Commonly known as:  APRESOLINE   Quantity:  270 tablet   Refills:  3   Dose:  25 mg   What changed:  Another medication with the same name was added. Make sure you understand how and when to take each.    Last time this was given:  25 mg on 4/6/2017  3:00 PM   Instructions:  Take 1 tablet (25 mg total) by mouth every 8 (eight) hours.     Begin Date    AM    Noon    PM    Bedtime       * hydrALAZINE 25 MG tablet   Commonly known as:  APRESOLINE   Quantity:  90 tablet   Refills:  11   Dose:  25 mg   What changed:  You were already taking a medication with the same name, and this prescription was added. Make sure you understand how and when to take each.    Last time this was given:  25 mg on 4/6/2017  3:00 PM   Instructions:  Take 1 tablet (25 mg total) by mouth every 8 (eight) hours.     Begin Date     AM    Noon    PM    Bedtime       * isosorbide dinitrate 20 MG tablet   Commonly known as:  ISORDIL   Quantity:  270 tablet   Refills:  3   Dose:  20 mg   What changed:  Another medication with the same name was added. Make sure you understand how and when to take each.    Last time this was given:  20 mg on 4/6/2017  3:00 PM   Instructions:  Take 1 tablet (20 mg total) by mouth 3 (three) times daily.     Begin Date    AM    Noon    PM    Bedtime       * isosorbide dinitrate 20 MG tablet   Commonly known as:  ISORDIL   Quantity:  90 tablet   Refills:  11   Dose:  20 mg   What changed:  You were already taking a medication with the same name, and this prescription was added. Make sure you understand how and when to take each.    Last time this was given:  20 mg on 4/6/2017  3:00 PM   Instructions:  Take 1 tablet (20 mg total) by mouth 3 (three) times daily.     Begin Date    AM    Noon    PM    Bedtime       torsemide 20 MG Tab   Commonly known as:  DEMADEX   Quantity:  120 tablet   Refills:  2   Dose:  40 mg   What changed:    - when to take this  - additional instructions    Last time this was given:  40 mg on 4/6/2017 11:46 AM   Instructions:  Take 2 tablets (40 mg total) by mouth 2 (two) times daily.     Begin Date    AM    Noon    PM    Bedtime       warfarin 7.5 MG tablet   Commonly known as:  COUMADIN   Quantity:  45 tablet   Refills:  0   Dose:  12.5 mg   What changed:  how much to take    Last time this was given:  10 mg on 4/4/2017  6:00 PM   Instructions:  Take 1.5 tablets (11.25 mg total) by mouth Daily.     Begin Date    AM    Noon    PM    Bedtime       * Notice:  This list has 4 medication(s) that are the same as other medications prescribed for you. Read the directions carefully, and ask your doctor or other care provider to review them with you.      CONTINUE taking these medications        Additional Info                      aspirin 81 MG Chew   Refills:  0   Dose:  81 mg    Last time this was  given:  81 mg on 4/6/2017 11:40 AM   Instructions:  Take 1 tablet (81 mg total) by mouth once daily.     Begin Date    AM    Noon    PM    Bedtime       carvedilol 25 MG tablet   Commonly known as:  COREG   Quantity:  60 tablet   Refills:  1   Dose:  12.5 mg    Last time this was given:  12.5 mg on 4/6/2017 11:40 AM   Instructions:  Take 0.5 tablets (12.5 mg total) by mouth 2 (two) times daily.     Begin Date    AM    Noon    PM    Bedtime       nitroGLYCERIN 0.4 MG SL tablet   Commonly known as:  NITROSTAT   Quantity:  30 tablet   Refills:  11   Dose:  0.4 mg    Instructions:  Place 1 tablet (0.4 mg total) under the tongue every 5 (five) minutes as needed for Chest pain. Tablet, Sublingual Sublingual     Begin Date    AM    Noon    PM    Bedtime       potassium chloride SA 20 MEQ tablet   Commonly known as:  K-DURINÉSOR-CON   Refills:  0    Instructions:  Take by mouth once daily. Patient reports taking 25 mg daily     Begin Date    AM    Noon    PM    Bedtime            Where to Get Your Medications      These medications were sent to [facility]/pharmacy #[id number] - [place], [place] - [address] [facility]  [address] [facility] [address]     Phone:  [phone]     gabapentin 400 MG capsule    hydrALAZINE 25 MG tablet    isosorbide dinitrate 20 MG tablet    losartan 25 MG tablet    methylPREDNISolone 4 mg tablet    torsemide 20 MG Tab    warfarin 7.5 MG tablet                  Please bring to all follow up appointments:    1. A copy of your discharge instructions.  2. All medicines you are currently taking in their original bottles.  3. Identification and insurance card.    Please arrive 15 minutes ahead of scheduled appointment time.    Please call 24 hours in advance if you must reschedule your appointment and/or time.        Follow-up Information     [facility]    Specialty:  Emergency Medicine    Why:  If symptoms worsen    Contact information:    [address]  [place]  [place] [address] [phone]        Schedule an appointment as soon as possible for a visit with Carlin Swift MD.    Specialty:  Family Medicine    Contact information:    [address] [facility] [id number] [phone]          Schedule an appointment as soon as possible for a visit with Ajay Ambriz - Cardiology.    Specialty:  Cardiology    [facility]:    [address] [facility] [address] [phone]    Additional information:    3rd floor        Schedule an appointment as soon as possible for a visit with Ajay Ambriz - Wound Care.    Specialty:  Wound Care    Contact information:    [address] [facility] [address] [phone]    Additional information:    8th Floor        Follow up with Carlin Swift MD In 1 week.    Specialty:  Family Medicine    Why:  Please see your Primary care in 1 week for f/u and check of your INR and hospital follow up    Contact information:    [address] [facility] [id number] [phone]        Referrals     Future Orders    Ambulatory consult to Family Practice     Ambulatory Referral to Cardiology     Ambulatory Referral to Podiatry     Ambulatory Referral to Transplant, Heart     Ambulatory Referral to Transplant, Heart         Discharge Instructions     Future Orders    Call MD for:  difficulty breathing or increased cough     Call MD for:  increased confusion or weakness     Call MD for:  persistent dizziness, light-headedness, or visual disturbances     Call MD for:  persistent nausea and vomiting or diarrhea     Call MD for:  redness, tenderness, or signs of infection (pain, swelling, redness, odor or green/yellow discharge around incision site)     Call MD for:  severe persistent headache     Call MD for:  severe uncontrolled pain     Call MD for:  temperature >100.4     Call MD for:  worsening rash     Diet general     Questions:    Total calories:      Fat restriction, if any:       Protein restriction, if any:      Na restriction, if any:      Fluid restriction:      Additional restrictions:          Discharge Instructions       1. For you acute on chronic heart failure exacerbation you will be discharged with a new dose of torsemide 40mg by mouth twice a day    2. For your heel pain, you were seen by the podiatry doctors, you had an MRI that did not reveal structural damage or abnormality.  You will receive a new prescription Gabapentin for chronic foot/heel pain.  Podiatry to provide left walking boot.   Also they recommended a Medrol Dosepack to treat a left heel bursitis                Leg Swelling in Both Legs    Swelling of the feet, ankles, and legs is called edema. It is caused by excess fluid that has collected in the tissues. Extra fluid in the body settles in the lowest part because of gravity. This is why the legs and feet are most affected.  Some of the causes for edema include:  · Disease of the heart like congestive heart failure  · Standing or sitting for long periods of time  · Infection of the feet or legs  · Blood pooling in the veins of your legs (venous insufficiency)  · Dilated veins in your lower leg (varicose veins)  · Garters or other clothing that is tight on your legs. This will cause blood to pool in your legs because the clothing limits blood flow.  · Some medicines such as hormones like birth control pills, some blood pressure medicines like calcium channel blockers (amlodipine) and steroids, some antidepressants like MAO inhibitors and tricyclics  · Menstrual periods that cause you to retain fluids  · Many types of renal disease  · Liver failure or cirrhosis  · Pregnancy, some swelling is normal, but a sudden increase in leg swelling or weight gain can be a sign of a dangerous complication of pregnancy  · Poor nutrition  · Thyroid disease  Medical treatment will depend on what is causing the swelling in your legs. Your healthcare provider may prescribe water  pills (diuretics) to get rid of the extra fluid.  Home care  Follow these guidelines when caring for yourself at home:  · Don't wear clothing like garters that is tight on your legs.  · Keep your legs up while lying or sitting.  · If infection, injury, or recent surgery is causing the swelling, stay off your legs as much as possible until symptoms get better.  · If your healthcare provider says that your leg swelling is caused by venous insufficiency or varicose veins, don't sit or  one place for long periods of time. Take breaks and walk about every few hours. Brisk walking is a good exercise. It helps circulate the blood that has collected in your leg. Talk with your provider about using support stockings to stop daytime leg swelling.  · If your provider says that heart disease is causing your leg swelling, follow a low-salt diet to stop extra fluid from staying in your body. You may also need medicine.  Follow-up care  Follow up with your healthcare provider, or as advised.  When to seek medical advice  Call your healthcare provider right away if any of these occur:  · New shortness of breath or chest pain  · Shortness of breath or chest pain that gets worse  · Swelling in both legs or ankles that gets worse  · Swelling of the abdomen  · Redness, warmth, or swelling in one leg  · Fever of 100.4ºF (38ºC) or higher, or as directed by your healthcare provider  · Yellow color to your skin or eyes  · Rapid, unexplained weight gain  · Having to sleep upright or use an increased number of pillows  Date Last Reviewed: 3/31/2016  © 2512-1803 [employer]. [address] [place] [address], [place] [address]. All rights reserved. This information is not intended as a substitute for professional medical care. Always follow your healthcare professional's instructions.          Heart Failure: Warning Signs of a Flare-Up  You have a condition called heart failure. Once you have heart failure, flare-ups can happen.  Below are signs that can mean your heart failure is getting worse. If you notice any of these warning signs, call your healthcare provider.  Swelling    · Your feet, ankles, or lower legs get puffier.  · You notice skin changes on your lower legs.  · Your shoes feel too tight.  · Your clothes are tighter in the waist.  · You have trouble getting rings on or off your fingers.  Shortness of breath  · You have to breathe harder even when youre doing your normal activities or when youre resting.  · You are short of breath walking up stairs or even short distances.  · You wake up at night short of breath or coughing.  · You need to use more pillows or sit up to sleep.  · You wake up tired or restless.  Other warning signs  · You feel weaker, dizzy, or more tired.  · You have chest pain or changes in your heartbeat.  · You have a cough that wont go away.  · You cant remember things or dont feel like eating.  Tracking your weight  Gaining weight is often the first warning sign that heart failure is getting worse. Gaining even a few pounds can be a sign that your body is retaining excess water and salt. Weighing yourself each day in the morning after you urinate and before you eat, is the best way to know if you're retaining water. Get a scale that is easy to read and make sure you wear the same clothes and use the same scale every time you weigh. Your healthcare provider will show you how to track your weight. Call your doctor if you gain more than 2 pounds in 1 day, 5 pounds in 1 week, or whatever weight gain you were told to report by your doctor. This is often a sign of worsening heart failure and needs to be evaluated and treated before it compromises your breathing. Your doctor will tell you what to do next.    Date Last Reviewed: 3/15/2016  © 9091-6790 [employer]. [address] [place] [address], [place] [address]. All rights reserved. This information is not intended as a substitute for professional  "medical care. Always follow your healthcare professional's instructions.          Primary Diagnosis     Your primary diagnosis was:  Heart Failure      Admission Information     Date & Time Provider Department CSN    4/1/2017  8:41 PM José Luis Langford MD [facility] [id number]      Care Providers     Provider Role Specialty Primary office phone    José Luis Langford MD Attending Provider Hospitalist [phone]    José Luis Langford MD Team Attending  Hospitalist [phone]    Alien George MD Team Attending  Hospitalist [phone]      Your Vitals Were     BP Pulse Temp Resp Height Weight    138/79 (BP Location: Left arm, Patient Position: Sitting, BP Method: Automatic) 90 98.1 °F (36.7 °C) (Oral) 18 5' 9" (1.753 m) 108.4 kg (239 lb 1.4 oz)    SpO2 BMI             95% 35.31 kg/m2         Recent Lab Values        2/11/2007 10/17/2015                        3:25 AM  3:22 AM          A1C 6.3 (H) 6.4 (H)                     Pending Labs     Order Current Status    Basic metabolic panel In process    Blood Culture #1 **CANNOT BE ORDERED STAT** Preliminary result      Allergies as of 4/6/2017        Reactions    Acetaminophen     Itching    Oxycodone-acetaminophen     Other reaction(s): Itching    Ace Inhibitors Other (See Comments)    cough      Advance Directives     An advance directive is a document which, in the event you are no longer able to make decisions for yourself, tells your healthcare team what kind of treatment you do or do not want to receive, or who you would like to make those decisions for you.  If you do not currently have an advance directive, [facility] encourages you to create one.  For more information call:  [phone]), [phone] ([phone]),  or log on to [email or web address].        Language Assistance Services     ATTENTION: Language assistance services are available, free of charge. Please call [phone].      ATENCIÓN: Si habla " español, tiene a hernandez disposición servicios gratuitos de asistencia lingüística. Ken al [phone].     [facility] Ý: N?u b?n nói Ti?ng Vi?t, có các d?ch v? h? tr? ngôn ng? mi?n phí dành cho b?n. G?i s? [phone].        Heart Failure Education       Heart Failure: Being Active  You have a condition called heart failure. Being active doesnt mean that you have to wear yourself out. Even a little movement each day helps to strengthen your heart. If you cant get out to exercise, you can do simple stretching and strengthening exercises at home. These are good ways to keep you well-conditioned and prevent you and your heart from becoming excessively weak.    Ideas to get you started  · Add a little movement to things you do now. Walk to mail letters. Park your car at the far end of the parking lot and walk to the store. Walk up a flight of stairs instead of taking the elevator.  · Choose activities you enjoy. You might walk, swim, or ride an exercise bike. Things like gardening and washing the car count, too. Other possibilities include: washing dishes, walking the dog, walking around the mall, and doing aerobic activities with friends.  · Join a group exercise program at a [facility] or [facility], a senior center, or a community center. Or look into a hospital cardiac rehabilitation program. Ask your doctor if you qualify.  Tips to keep you going  · Get up and get dressed each day. Go to a coffee shop and read a newspaper or go somewhere that you'll be in the presence of other active people. Youll feel more like being active.  · Make a plan. Choose one or more activities that you enjoy and that you can easily do. Then plan to do at least one each day. You might write your plan on a calendar.  · Go with a friend or a group if you like company. This can help you feel supported and stay motivated, too.  · Plan social events that you enjoy. This will keep you mentally engaged as well as physically motivated to do things you find  pleasure in.  For your safety  · Talk with your healthcare provider before starting an exercise program.  · Exercise indoors when its too hot or too cold outside, or when the air quality is poor. Try walking at a shopping mall.  · Wear socks and sturdy shoes to maintain your balance and prevent falls.  · Start slowly. Do a few minutes several times a day at first. Increase your time and speed little by little.  · Stop and rest whenever you feel tired or get short of breath.  · Dont push yourself on days when you dont feel well.  Date Last Reviewed: 3/20/2016  © 3489-9389 [employer]. [address] [place] [address], [place] [address]. All rights reserved. This information is not intended as a substitute for professional medical care. Always follow your healthcare professional's instructions.              Heart Failure: Evaluating Your Heart  You have a condition called heart failure. To evaluate your condition, your doctor will examine you, ask questions, and do some tests. Along with looking for signs of heart failure, the doctor looks for any other health problems that may have led to heart failure. The results of your evaluation will help your doctor form a treatment plan.  Health history and physical exam  Your visit will start with a health history. Tell the doctor about any symptoms youve noticed and about all medicines you take. Then youll have a physical exam. This includes listening to your heartbeat and breathing. Youll also be checked for swelling (edema) in your legs and neck. When you have fluid buildup or fluid in the lungs, it may be called congestive heart failure.  Diagnosing heart failure     During an echocardiogram, sound waves bounce off the heart. These are converted into a picture on the screen.   The following may be done to help your doctor form a diagnosis:  · X-rays show the size and shape of your heart. These pictures can also show fluid in your lungs.  · An  electrocardiogram (ECG or EKG) shows the pattern of your heartbeat. Small pads (electrodes) are placed on your chest, arms, and legs. Wires connect the pads to the ECG machine, which records your hearts electrical signals. This can give the doctor information about heart function.  · An echocardiogram uses ultrasound waves to show the structure and movement of your heart muscle. This shows how well the heart pumps. It also shows the thickness of the heart walls, and if the heart is enlarged. It is one of the most useful, non-invasive tests as it provides information about the heart's general function. This helps your doctor make treatment decisions.  · Lab tests evaluate small amounts of blood or urine for signs of problems. A BNP lab test can help diagnose and evaluate heart failure. BNP stands for B-type natriuretic peptide. The ventricles secrete more BNP when heart failure worsens. Lab tests can also provide information about metabolic dysfunction or heart dysfunction.  Your treatment plan  Based on the results of your evaluation and tests, your doctor will develop a treatment plan. This plan is designed to relieve some of your heart failure symptoms and help make you more comfortable. Your treatment plan may include:  · Medicine to help your heart work better and improve your quality of life  · Changes in what you eat and drink to help prevent fluid from backing up in your body  · Daily monitoring of your weight and heart failure symptoms to see how well your treatment plan is working  · Exercise to help you stay healthy  · Help with quitting smoking  · Emotional and psychological support to help adjust to the changes  · Referrals to other specialists to make sure you are being treated comprehensively  Date Last Reviewed: 3/21/2016  © 4161-6864 [employer]. [address] [place] [address], [place] [address]. All rights reserved. This information is not intended as a substitute for professional medical  care. Always follow your healthcare professional's instructions.              Heart Failure: Making Changes to Your Diet  You have a condition called heart failure. When you have heart failure, excess fluid is more likely to build up in your body because your heart isn't working well. This makes the heart work harder to pump blood. Fluid buildup causes symptoms such as shortness of breath and swelling (edema). This is often referred to as congestive heart failure or CHF. Controlling the amount of salt (sodium) you eat may help stop fluid from building up. Your doctor may also tell you to reduce the amount of fluid you drink.  Reading food labels    Your healthcare provider will tell you how much sodium you can eat each day. Read food labels to keep track. Keep in mind that certain foods are high in salt. These include canned, frozen, and processed foods. Check the amount of sodium in each serving. Watch out for high-sodium ingredients. These include MSG (monosodium glutamate), baking soda, and sodium phosphate.   Eating less salt  Give yourself time to get used to eating less salt. It may take a little while. Here are some tips to help:  · Take the saltshaker off the table. Replace it with salt-free herb mixes and spices.  · Eat fresh or plain frozen vegetables. These have much less salt than canned vegetables.  · Choose low-sodium snacks like sodium-free pretzels, crackers, or air-popped popcorn.  · Dont add salt to your food when youre cooking. Instead, season your foods with pepper, lemon, garlic, or onion.  · When you eat out, ask that your food be cooked without added salt.  · Avoid eating fried foods as these often have a great deal of salt.  If youre told to limit fluids  You may need to limit how much fluid you have to help prevent swelling. This includes anything that is liquid at room temperature, such as ice cream and soup. If your doctor tells you to limit fluid, try these tips:  · Measure drinks in a  measuring cup before you drink them. This will help you meet daily goals.  · Chill drinks to make them more refreshing.  · Suck on frozen lemon wedges to quench thirst.  · Only drink when youre thirsty.  · Chew sugarless gum or suck on hard candy to keep your mouth moist.  · Weigh yourself daily to know if your body's fluid content is rising.  My sodium goal  Your healthcare provider may give you a sodium goal to meet each day. This includes sodium found in food as well as salt that you add. My goal is to eat no more than ___________ mg of sodium per day.     When to call your doctor  Call your doctor right away if you have any symptoms of worsening heart failure. These can include:  · Sudden weight gain  · Increased swelling of your legs or ankles  · Trouble breathing when youre resting or at night  · Increase in the number of pillows you have to sleep on  · Chest pain, pressure, discomfort, or pain in the jaw, neck, or back   Date Last Reviewed: 3/21/2016  © 6323-1197 [employer]. [address] [place] [address]. All rights reserved. This information is not intended as a substitute for professional medical care. Always follow your healthcare professional's instructions.              Heart Failure: Medicines to Help Your Heart    You have a condition called heart failure (also known as congestive heart failure, or CHF). Your doctor will likely prescribe medicines for heart failure and any underlying health problems you have. Most heart failure patients take one or more types of medicinen. Your healthcare provider will work to find the combination of medicines that works best for you.  Heart failure medicines  Here are the most common heart failure medicines:  · ACE inhibitors lower blood pressure and decrease strain on the heart. This makes it easier for the heart to pump. Angiotensin receptor blockers have similar effects. These are prescribed for some patients instead of ACE  inhibitors.  · Beta-blockers relieve stress on the heart. They also improve symptoms. They may also improve the heart's pumping action over time.  · Diuretics (also called water pills) help rid your body of excess water. This can help rid your body of swelling (edema). Having less fluid to pump means your heart doesnt have to work as hard. Some diuretics make your body lose a mineral called potassium. Your doctor will tell you if you need to take supplements or eat more foods high in potassium.  · Digoxin helps your heart pump with more strength. This helps your heart pump more blood with each beat. So, more oxygen-rich blood travels to the rest of the body.  · Aldosterone antagonists help alter hormones and decrease strain on the heart.  · Hydralazine and nitrates are two separate medicines used together to treat heart failure. They may come in one combination pill. They lower blood pressure and decrease how hard the heart has to pump.  Medicines for related conditions  Controlling other heart problems helps keep heart failure under control, too. Depending on other heart problems you have, medicines may be prescribed to:  · Lower blood pressure (antihypertensives).  · Lower cholesterol levels (statins).  · Prevent blood clots (anticoagulants or aspirin).  · Keep the heartbeat steady (antiarrhythmics).  Date Last Reviewed: 3/5/2016  © 5162-6511 [employer]. [address] [place] [address], [place] [address]. All rights reserved. This information is not intended as a substitute for professional medical care. Always follow your healthcare professional's instructions.              Heart Failure: Procedures That May Help    The heart is a muscle that pumps oxygen-rich blood to all parts of the body. When you have heart failure, the heart is not able to pump as well as it should. Blood and fluid may back up into the lungs (congestive heart failure), and some parts of the body dont get enough oxygen-rich blood  to work normally. These problems lead to the symptoms of heart failure.     Certain procedures may help the heart pump better in some cases of heart failure. Some procedures are done to treat health problems that may have caused the heart failure such as coronary artery disease or heart rhythm problems. For more serious heart failure, other options are available.  Treating artery and valve problems  If you have coronary artery disease or valve disease, procedures may be done to improve blood flow. This helps the heart pump better, which can improve heart failure symptoms. First, your doctor may do a cardiac catheterization to help detect clogged blood vessels or valve damage. During this procedure, a  thin tube (catheter) in inserted into a blood vessel and guided to the heart. There a dye is injected and a special type of X-ray (angiogram) is taken of the blood vessels. Procedures to open a blocked artery or fix damaged valves can also be done using catheterization.  · Angioplasty uses a balloon-tipped instrument at the end of the catheter. The balloon is inflated to widen the narrowed artery. In many cases, a stent is expanded to further support the narrowed artery. A stent is a metal mesh tube.  · Valve surgery repairs or replacement of faulty valves can also be done during catheterization so blood can flow properly through the chambers of the heart.  Bypass surgery is another option to help treat blocked arteries. It uses a healthy blood vessel from elsewhere in the body. The healthy blood vessel is attached above and below the blocked area so that blood can flow around the blocked artery.  Treating heart rhythm problems  A device may be placed in the chest to help a weak heart maintain a healthy, heartbeat so the heart can pump more effectively:  · Pacemaker. A pacemaker is an implanted device that regulates your heartbeat electronically. It monitors your heart's rhythm and generates a painless electric impulse  that helps the heart beat in a regular rhythm. A pacemaker is programmed to meet your specific heart rhythm needs.  · Biventricular pacing/cardiac resynchronization therapy. A type of pacemaker that paces both pumping chambers of the heart at the same time to coordinate contractions and to improve the heart's function. Some people with heart failure are candidates for this therapy.  · Implantable cardioverter defibrillator. A device similar to a pacemaker that senses when the heart is beating too fast and delivers an electrical shock to convert the fast rhythm to a normal rhythm. This can be a life saving device.  In severe cases  In more serious cases of heart failure when other treatments no longer work, other options may include:  · Ventricular assist devices (VADs). These are mechanical devices used to take over the pumping function for one or both of the heart's ventricles, or pumping chambers. A VAD may be necessary when heart failure progresses to the point that medicines and other treatments no longer help. In some cases, a VAD may be used as a bridge to transplant.  · Heart transplant. This is replacing the diseased heart with a healthy one from a donor. This is an option for a few people who are very sick. A heart transplant is very serious and not an option for all patients. Your doctor can tell you more.  Date Last Reviewed: 3/20/2016  © 2273-5624 [employer]. [address] [place] [address], [place] [address]. All rights reserved. This information is not intended as a substitute for professional medical care. Always follow your healthcare professional's instructions.              Heart Failure: Tracking Your Weight  You have a condition called heart failure. When you have heart failure, a sudden weight gain or a steady rise in weight is a warning sign that your body is retaining too much water and salt. This could mean your heart failure is getting worse. If left untreated, it can cause problems  for your lungs and result in shortness of breath. Weighing yourself each day is the best way to know if youre retaining water. If your weight goes up quickly, call your doctor. You will be given instructions on how to get rid of the excess water. You will likely need medicines and to avoid salt. This will help your heart work better.  Call your doctor if you gain more than 2 pounds in 1 day, more than 5 pounds in 1 week, or whatever weight gain you were told to report by your doctor. This is often a sign of worsening heart failure and needs to be evaluated and treated. Your doctor will tell you what to do next.   Tips for weighing yourself    · Weigh yourself at the same time each morning, wearing the same clothes. Weigh yourself after urinating and before eating.  · Use the same scale each day. Make sure the numbers are easy to read. Put the scale on a flat, hard surface -- not on a rug or carpet.  · Do not stop weighing yourself. If you forget one day, weigh again the next morning.  How to use your weight chart  · Keep your weight chart near the scale. Write your weight on the chart as soon as you get off the scale.  · Fill in the month and the start date on the chart. Then write down your weight each day. Your chart will look like this:    · If you miss a day, leave the space blank. Weigh yourself the next day and write your weight in the next space.  · Take your weight chart with you when you go to see your doctor.  Date Last Reviewed: 3/20/2016  © 7352-7364 [employer]. [address] [place] [address], [place] [address]. All rights reserved. This information is not intended as a substitute for professional medical care. Always follow your healthcare professional's instructions.              Heart Failure: Warning Signs of a Flare-Up  You have a condition called heart failure. Once you have heart failure, flare-ups can happen. Below are signs that can mean your heart failure is getting worse. If you notice  any of these warning signs, call your healthcare provider.  Swelling    · Your feet, ankles, or lower legs get puffier.  · You notice skin changes on your lower legs.  · Your shoes feel too tight.  · Your clothes are tighter in the waist.  · You have trouble getting rings on or off your fingers.  Shortness of breath  · You have to breathe harder even when youre doing your normal activities or when youre resting.  · You are short of breath walking up stairs or even short distances.  · You wake up at night short of breath or coughing.  · You need to use more pillows or sit up to sleep.  · You wake up tired or restless.  Other warning signs  · You feel weaker, dizzy, or more tired.  · You have chest pain or changes in your heartbeat.  · You have a cough that wont go away.  · You cant remember things or dont feel like eating.  Tracking your weight  Gaining weight is often the first warning sign that heart failure is getting worse. Gaining even a few pounds can be a sign that your body is retaining excess water and salt. Weighing yourself each day in the morning after you urinate and before you eat, is the best way to know if you're retaining water. Get a scale that is easy to read and make sure you wear the same clothes and use the same scale every time you weigh. Your healthcare provider will show you how to track your weight. Call your doctor if you gain more than 2 pounds in 1 day, 5 pounds in 1 week, or whatever weight gain you were told to report by your doctor. This is often a sign of worsening heart failure and needs to be evaluated and treated before it compromises your breathing. Your doctor will tell you what to do next.    Date Last Reviewed: 3/15/2016  © 8129-7126 [employer]. [address] [place] [address], [place] [address]. All rights reserved. This information is not intended as a substitute for professional medical care. Always follow your healthcare professional's instructions.               Coumadin Discharge Instructions                         Chronic [facility] Disease Education             [facility] Sign-Up     Activating your [facility] account is as easy as 1-2-3!     1) Visit [email or web address], select Sign Up Now, enter this activation code and your date of birth, then select Next.  [email or web address]  Expires: 5/21/2017  3:03 PM      2) Create a username and password to use when you visit [facility] in the future and select a security question in case you lose your password and select Next.    3) Enter your e-mail address and click Sign Up!    Additional Information  If you have questions, please e-mail [email or web address] or call [phone] to talk to our [facility] staff. Remember, [facility] is NOT to be used for urgent needs. For medical emergencies, dial 911.          [facility] complies with applicable Federal civil rights laws and does not discriminate on the basis of race, color, national origin, age, disability, or sex.

## 2017-04-02 LAB
ALBUMIN SERPL BCP-MCNC: 3.6 G/DL
ALP SERPL-CCNC: 141 U/L
ALT SERPL W/O P-5'-P-CCNC: 9 U/L
ANION GAP SERPL CALC-SCNC: 13 MMOL/L
AST SERPL-CCNC: 20 U/L
BASOPHILS # BLD AUTO: 0.04 K/UL
BASOPHILS NFR BLD: 0.6 %
BILIRUB SERPL-MCNC: 2.1 MG/DL
BUN SERPL-MCNC: 16 MG/DL
CALCIUM SERPL-MCNC: 9.6 MG/DL
CHLORIDE SERPL-SCNC: 100 MMOL/L
CO2 SERPL-SCNC: 28 MMOL/L
CREAT SERPL-MCNC: 1.4 MG/DL
DIASTOLIC DYSFUNCTION: YES
DIFFERENTIAL METHOD: ABNORMAL
EOSINOPHIL # BLD AUTO: 0.4 K/UL
EOSINOPHIL NFR BLD: 5.1 %
ERYTHROCYTE [DISTWIDTH] IN BLOOD BY AUTOMATED COUNT: 13.4 %
EST. GFR  (AFRICAN AMERICAN): >60 ML/MIN/1.73 M^2
EST. GFR  (NON AFRICAN AMERICAN): 58.2 ML/MIN/1.73 M^2
ESTIMATED PA SYSTOLIC PRESSURE: 45
GLUCOSE SERPL-MCNC: 92 MG/DL
HCT VFR BLD AUTO: 37.2 %
HGB BLD-MCNC: 11.7 G/DL
INR PPP: 1.4
LYMPHOCYTES # BLD AUTO: 2.2 K/UL
LYMPHOCYTES NFR BLD: 30.9 %
MAGNESIUM SERPL-MCNC: 1.8 MG/DL
MCH RBC QN AUTO: 27.7 PG
MCHC RBC AUTO-ENTMCNC: 31.5 %
MCV RBC AUTO: 88 FL
MITRAL VALVE REGURGITATION: ABNORMAL
MONOCYTES # BLD AUTO: 0.9 K/UL
MONOCYTES NFR BLD: 12.9 %
NEUTROPHILS # BLD AUTO: 3.6 K/UL
NEUTROPHILS NFR BLD: 50.4 %
PLATELET # BLD AUTO: 228 K/UL
PMV BLD AUTO: 9.6 FL
POTASSIUM SERPL-SCNC: 3.5 MMOL/L
PROT SERPL-MCNC: 8.5 G/DL
PROTHROMBIN TIME: 14.3 SEC
RBC # BLD AUTO: 4.23 M/UL
RETIRED EF AND QEF - SEE NOTES: 25 (ref 55–65)
SODIUM SERPL-SCNC: 141 MMOL/L
TRICUSPID VALVE REGURGITATION: ABNORMAL
TROPONIN I SERPL DL<=0.01 NG/ML-MCNC: 0.07 NG/ML
WBC # BLD AUTO: 7.21 K/UL

## 2017-04-02 PROCEDURE — 36415 COLL VENOUS BLD VENIPUNCTURE: CPT

## 2017-04-02 PROCEDURE — 80053 COMPREHEN METABOLIC PANEL: CPT

## 2017-04-02 PROCEDURE — 93306 TTE W/DOPPLER COMPLETE: CPT

## 2017-04-02 PROCEDURE — 11000001 HC ACUTE MED/SURG PRIVATE ROOM

## 2017-04-02 PROCEDURE — 85610 PROTHROMBIN TIME: CPT

## 2017-04-02 PROCEDURE — 99219 PR INITIAL OBSERVATION CARE,LEVL II: CPT | Mod: ,,, | Performed by: INTERNAL MEDICINE

## 2017-04-02 PROCEDURE — 25000003 PHARM REV CODE 250: Performed by: INTERNAL MEDICINE

## 2017-04-02 PROCEDURE — 85025 COMPLETE CBC W/AUTO DIFF WBC: CPT

## 2017-04-02 PROCEDURE — 83735 ASSAY OF MAGNESIUM: CPT

## 2017-04-02 PROCEDURE — G0378 HOSPITAL OBSERVATION PER HR: HCPCS

## 2017-04-02 PROCEDURE — 63600175 PHARM REV CODE 636 W HCPCS: Performed by: HOSPITALIST

## 2017-04-02 PROCEDURE — 63600175 PHARM REV CODE 636 W HCPCS: Performed by: INTERNAL MEDICINE

## 2017-04-02 PROCEDURE — 25000003 PHARM REV CODE 250: Performed by: HOSPITALIST

## 2017-04-02 PROCEDURE — 84484 ASSAY OF TROPONIN QUANT: CPT

## 2017-04-02 PROCEDURE — 93306 TTE W/DOPPLER COMPLETE: CPT | Mod: 26,,, | Performed by: INTERNAL MEDICINE

## 2017-04-02 RX ORDER — OXYCODONE HYDROCHLORIDE 5 MG/1
5 TABLET ORAL EVERY 6 HOURS PRN
Status: DISCONTINUED | OUTPATIENT
Start: 2017-04-02 | End: 2017-04-06

## 2017-04-02 RX ORDER — POTASSIUM CHLORIDE 20 MEQ/15ML
60 SOLUTION ORAL ONCE
Status: COMPLETED | OUTPATIENT
Start: 2017-04-02 | End: 2017-04-02

## 2017-04-02 RX ORDER — FUROSEMIDE 10 MG/ML
80 INJECTION INTRAMUSCULAR; INTRAVENOUS 2 TIMES DAILY
Status: DISCONTINUED | OUTPATIENT
Start: 2017-04-02 | End: 2017-04-06

## 2017-04-02 RX ORDER — CARVEDILOL 12.5 MG/1
12.5 TABLET ORAL 2 TIMES DAILY
Status: DISCONTINUED | OUTPATIENT
Start: 2017-04-02 | End: 2017-04-06 | Stop reason: HOSPADM

## 2017-04-02 RX ORDER — MORPHINE SULFATE 2 MG/ML
4 INJECTION, SOLUTION INTRAMUSCULAR; INTRAVENOUS EVERY 6 HOURS PRN
Status: DISCONTINUED | OUTPATIENT
Start: 2017-04-02 | End: 2017-04-03

## 2017-04-02 RX ORDER — POTASSIUM CHLORIDE 20 MEQ/1
60 TABLET, EXTENDED RELEASE ORAL ONCE
Status: DISCONTINUED | OUTPATIENT
Start: 2017-04-02 | End: 2017-04-02

## 2017-04-02 RX ORDER — NITROGLYCERIN 0.4 MG/1
0.4 TABLET SUBLINGUAL EVERY 5 MIN PRN
Status: DISCONTINUED | OUTPATIENT
Start: 2017-04-02 | End: 2017-04-06 | Stop reason: HOSPADM

## 2017-04-02 RX ORDER — AMOXICILLIN 250 MG
1 CAPSULE ORAL DAILY PRN
Status: DISCONTINUED | OUTPATIENT
Start: 2017-04-02 | End: 2017-04-05

## 2017-04-02 RX ORDER — WARFARIN SODIUM 5 MG/1
10 TABLET ORAL DAILY
Status: DISCONTINUED | OUTPATIENT
Start: 2017-04-02 | End: 2017-04-05

## 2017-04-02 RX ORDER — ISOSORBIDE DINITRATE 10 MG/1
20 TABLET ORAL 3 TIMES DAILY
Status: DISCONTINUED | OUTPATIENT
Start: 2017-04-02 | End: 2017-04-06 | Stop reason: HOSPADM

## 2017-04-02 RX ORDER — NAPROXEN SODIUM 220 MG/1
81 TABLET, FILM COATED ORAL DAILY
Status: DISCONTINUED | OUTPATIENT
Start: 2017-04-02 | End: 2017-04-06 | Stop reason: HOSPADM

## 2017-04-02 RX ORDER — SODIUM CHLORIDE 0.9 % (FLUSH) 0.9 %
3 SYRINGE (ML) INJECTION EVERY 8 HOURS
Status: DISCONTINUED | OUTPATIENT
Start: 2017-04-02 | End: 2017-04-06 | Stop reason: HOSPADM

## 2017-04-02 RX ORDER — HYDRALAZINE HYDROCHLORIDE 25 MG/1
25 TABLET, FILM COATED ORAL EVERY 8 HOURS
Status: DISCONTINUED | OUTPATIENT
Start: 2017-04-02 | End: 2017-04-06 | Stop reason: HOSPADM

## 2017-04-02 RX ORDER — OXYCODONE HYDROCHLORIDE 5 MG/1
5 TABLET ORAL ONCE
Status: COMPLETED | OUTPATIENT
Start: 2017-04-02 | End: 2017-04-02

## 2017-04-02 RX ADMIN — ISOSORBIDE DINITRATE 20 MG: 10 TABLET ORAL at 05:04

## 2017-04-02 RX ADMIN — FUROSEMIDE 80 MG: 10 INJECTION, SOLUTION INTRAMUSCULAR; INTRAVENOUS at 09:04

## 2017-04-02 RX ADMIN — ISOSORBIDE DINITRATE 20 MG: 10 TABLET ORAL at 09:04

## 2017-04-02 RX ADMIN — POTASSIUM BICARBONATE 50 MEQ: 25 TABLET, EFFERVESCENT ORAL at 11:04

## 2017-04-02 RX ADMIN — ISOSORBIDE DINITRATE 20 MG: 10 TABLET ORAL at 03:04

## 2017-04-02 RX ADMIN — CARVEDILOL 12.5 MG: 12.5 TABLET, FILM COATED ORAL at 09:04

## 2017-04-02 RX ADMIN — WARFARIN SODIUM 10 MG: 5 TABLET ORAL at 04:04

## 2017-04-02 RX ADMIN — Medication 3 ML: at 06:04

## 2017-04-02 RX ADMIN — HYDRALAZINE HYDROCHLORIDE 25 MG: 25 TABLET, FILM COATED ORAL at 05:04

## 2017-04-02 RX ADMIN — OXYCODONE HYDROCHLORIDE 5 MG: 5 TABLET ORAL at 03:04

## 2017-04-02 RX ADMIN — ASPIRIN 81 MG CHEWABLE TABLET 81 MG: 81 TABLET CHEWABLE at 09:04

## 2017-04-02 RX ADMIN — MORPHINE SULFATE 4 MG: 2 INJECTION, SOLUTION INTRAMUSCULAR; INTRAVENOUS at 09:04

## 2017-04-02 RX ADMIN — OXYCODONE HYDROCHLORIDE 5 MG: 5 TABLET ORAL at 04:04

## 2017-04-02 RX ADMIN — Medication 3 ML: at 05:04

## 2017-04-02 RX ADMIN — POTASSIUM CHLORIDE 60 MEQ: 20 SOLUTION ORAL at 01:04

## 2017-04-02 RX ADMIN — FUROSEMIDE 80 MG: 10 INJECTION, SOLUTION INTRAMUSCULAR; INTRAVENOUS at 05:04

## 2017-04-02 RX ADMIN — HYDRALAZINE HYDROCHLORIDE 25 MG: 25 TABLET, FILM COATED ORAL at 03:04

## 2017-04-02 NOTE — ED PROVIDER NOTES
Encounter Date: 4/1/2017       History     Chief Complaint   Patient presents with    Leg Swelling     bilateral lower extremity edema. weeping and blisters are forming     Review of patient's allergies indicates:   Allergen Reactions    Acetaminophen      Itching    Oxycodone-acetaminophen      Other reaction(s): Itching    Ace inhibitors Other (See Comments)     cough     HPI Comments: 52y/o M hx CHF afib on coumadin and gout presents w/ bilateral leg swelling x 4 days w/ R toe and R leg pain. Denies any trauma or injury, has been swollen before given hx CHF but was using compression stockings. Says it started itching recently and he started picking at it w/ resulting blisters. Denies any cardiopulmonary symptoms; states compliance w/ his medications. Has not taken any NSAIDs recently saying that they do not work.    The history is provided by the patient.     Past Medical History:   Diagnosis Date    Anticoagulant long-term use     CHF (congestive heart failure)     Chronic combined systolic and diastolic congestive heart failure     Coronary artery disease     CVA (cerebrovascular accident)     Heart attack 2006    Hypertension     Hyperthyroidism, subclinical 1/2/2013    Paroxysmal atrial fibrillation     S/P ablation of atrial flutter 2008    Stroke 2009    no residual weaknesses     Past Surgical History:   Procedure Laterality Date    RADIOFREQUENCY ABLATION  01/08/2008    for atrial flutter     Family History   Problem Relation Age of Onset    Hypertension Mother     Stroke Mother     Hypertension Father     Alcohol abuse Father     Hypertension Sister     Hypertension Brother      Social History   Substance Use Topics    Smoking status: Never Smoker    Smokeless tobacco: None    Alcohol use No     Review of Systems   Constitutional: Negative for chills and fever.   HENT: Negative for trouble swallowing.    Eyes: Negative for visual disturbance.   Respiratory: Negative for shortness  of breath.    Cardiovascular: Positive for leg swelling. Negative for chest pain.   Gastrointestinal: Negative for abdominal pain, blood in stool, nausea and vomiting.   Genitourinary: Negative for hematuria.   Musculoskeletal: Positive for arthralgias.   Skin: Positive for wound.   Neurological: Negative for weakness, numbness and headaches.       Physical Exam   Initial Vitals   BP Pulse Resp Temp SpO2   04/01/17 1945 04/01/17 1945 04/01/17 1945 04/01/17 1945 04/01/17 1945   160/71 75 16 99.4 °F (37.4 °C) 95 %     Physical Exam    Nursing note and vitals reviewed.  Constitutional: He appears well-developed and well-nourished. He is not diaphoretic. No distress.   HENT:   Head: Normocephalic and atraumatic.   Eyes: Conjunctivae and EOM are normal. Pupils are equal, round, and reactive to light.   Neck: Normal range of motion.   Cardiovascular: Normal rate and intact distal pulses.   Murmur: 2+ DP.  Irregularly irregular   Pulmonary/Chest: Breath sounds normal. No respiratory distress. He has no wheezes. He has no rhonchi. He has no rales.   Abdominal: Soft. He exhibits no distension. There is no tenderness. There is no rebound and no guarding.   Musculoskeletal: He exhibits edema and tenderness.   Pt's R toe very tender to light brushing of hand. No significant swelling or overlying skin changes suggestive of infection  2+ pitting edema in bilateral lower extremities below knees w/ L appearing greater than R side. Overlying skin changes visible over ankles suggestive of chronic venous insufficiency. Two small open wounds visible on either leg w/o other sign of infection   Neurological: He is alert.   Skin: Skin is warm and dry.         ED Course   Procedures  Labs Reviewed   CULTURE, BLOOD   CBC W/ AUTO DIFFERENTIAL   COMPREHENSIVE METABOLIC PANEL   TROPONIN I   B-TYPE NATRIURETIC PEPTIDE   PROTIME-INR                   APC / Resident Notes:   PGY-3 MDM: 50y/o M w/ leg swelling will eval for ACS, CHF, gout,  arthritis, significant infection; consideration for DVT but presentation not consistent w/ DVT and patient already on coumadin w/o any respiratory symptoms. Disposition pending results.    Lakisha Mohamud MD  PGY-3 EM 9:35 PM 4/1/2017      PGY-3 MDM: Lab and CXR results show signs of chronic CHF exacerbation including pulmonary congestion, elevated troponin and BNP though within his baseline. His leg swelling is likely a clinical sign of CHF. Given high likelihood of worsening outcomes will admit to medicine and consult cardiology.    Lakisha Mohamud MD  PGY-3 EM 11:53 PM 4/1/2017             Attending Attestation:   Physician Attestation Statement for Resident:  As the supervising MD   Physician Attestation Statement: I have personally seen and examined this patient.   I agree with the above history. -: CHF exacerbation, for IV lasix and admission   As the supervising MD I agree with the above PE.    As the supervising MD I agree with the above treatment, course, plan, and disposition.  I have reviewed and agree with the residents interpretation of the following: lab data, x-rays and EKG.  I have reviewed the following: old records at this facility.                    ED Course     Clinical Impression:   The primary encounter diagnosis was Leg swelling. Diagnoses of Chronic heart failure, unspecified heart failure type, Hyperbilirubinemia, Pulmonary congestion, CHF (congestive heart failure), and Acute on chronic systolic CHF (congestive heart failure) were also pertinent to this visit.          Lakisha Mohamud MD  Resident  04/02/17 0700       Moni Easley MD  04/03/17 2042

## 2017-04-02 NOTE — H&P
[facility]  Cardiology  History and Physical     Patient Name: Hamlet Terrell  MRN: [id number]  Admission Date: 4/1/2017  Code Status: Full Code   Attending Provider: Kari Berumen MD   Primary Care Physician: Carlin Swift MD  Principal Problem:Acute on chronic systolic CHF (congestive heart failure)    Patient information was obtained from patient.     Subjective:     Chief Complaint:  Leg blisters    HPI:  The patient reports blisters in his legs starting on Wednesday. He endorses LE swelling and MUÑOZ. The patient denies bloody stools,fever, hemoptysis, salty food intake, and medical non-adherence. He denies palpitations and CP. The patient denies EtOH. He says he takes 40 mg BID of torsemide though he says his doctor wants him to take 40 mg TID. The patient is concerned about running out of the torsemide with three times a day dosing.       Past Medical History:   Diagnosis Date    Anticoagulant long-term use     CHF (congestive heart failure)     Chronic combined systolic and diastolic congestive heart failure     Coronary artery disease     CVA (cerebrovascular accident)     Heart attack 2006    Hypertension     Hyperthyroidism, subclinical 1/2/2013    Paroxysmal atrial fibrillation     S/P ablation of atrial flutter 2008    Stroke 2009    no residual weaknesses       Past Surgical History:   Procedure Laterality Date    RADIOFREQUENCY ABLATION  01/08/2008    for atrial flutter       Review of patient's allergies indicates:   Allergen Reactions    Acetaminophen      Itching    Oxycodone-acetaminophen      Other reaction(s): Itching    Ace inhibitors Other (See Comments)     cough       No current facility-administered medications on file prior to encounter.      Current Outpatient Prescriptions on File Prior to Encounter   Medication Sig    aspirin 81 MG Chew Take 1 tablet (81 mg total) by mouth once daily.    carvedilol (COREG) 25 MG tablet Take 0.5 tablets (12.5 mg total)  by mouth 2 (two) times daily.    hydrALAZINE (APRESOLINE) 25 MG tablet Take 1 tablet (25 mg total) by mouth every 8 (eight) hours.    isosorbide dinitrate (ISORDIL) 20 MG tablet Take 1 tablet (20 mg total) by mouth 3 (three) times daily.    torsemide (DEMADEX) 20 MG Tab Take 2 tablets (40 mg total) by mouth once daily. Patient reports taking 20 mg TWICE DAILY    warfarin (COUMADIN) 7.5 MG tablet Take 1 tablet (7.5 mg total) by mouth Daily.    nitroGLYCERIN (NITROSTAT) 0.4 MG SL tablet Place 1 tablet (0.4 mg total) under the tongue every 5 (five) minutes as needed for Chest pain. Tablet, Sublingual Sublingual    potassium chloride SA (K-DUR,KLOR-CON) 20 MEQ tablet Take by mouth once daily. Patient reports taking 25 mg daily    senna-docusate 8.6-50 mg (PERICOLACE) 8.6-50 mg per tablet Take 1 tablet by mouth daily as needed for Constipation.    [DISCONTINUED] ranolazine (RANEXA) 1,000 mg Tb12 Take 1 tablet (1,000 mg total) by mouth 2 (two) times daily.     Family History     Problem Relation (Age of Onset)    Alcohol abuse Father    Hypertension Mother, Father, Sister, Brother    Stroke Mother        Social History Main Topics    Smoking status: Never Smoker    Smokeless tobacco: Not on file    Alcohol use No    Drug use: No    Sexual activity: No     Review of Systems   Constitution: Negative for chills and fever.   HENT: Negative for ear discharge and headaches.    Eyes: Negative for pain and visual disturbance.   Cardiovascular: Positive for dyspnea on exertion. Negative for chest pain, irregular heartbeat, leg swelling, orthopnea, palpitations, paroxysmal nocturnal dyspnea and syncope.   Respiratory: Negative for hemoptysis, shortness of breath and wheezing.    Skin: Negative for rash and suspicious lesions.   Musculoskeletal: Negative for joint pain and muscle weakness.   Gastrointestinal: Negative for abdominal pain, diarrhea, hematemesis, hematochezia, melena, nausea and vomiting.   Genitourinary:  Negative for dysuria and frequency.   Neurological: Negative for focal weakness and tremors.   Psychiatric/Behavioral: Negative for altered mental status, suicidal ideas and thoughts of violence.     Objective:     Vital Signs (Most Recent):  Temp: 99.4 °F (37.4 °C) (04/01/17 1945)  Pulse: 75 (04/01/17 1945)  Resp: 16 (04/01/17 1945)  BP: (!) 160/71 (04/01/17 1945)  SpO2: 95 % (04/01/17 1945) Vital Signs (24h Range):  Temp:  [99.4 °F (37.4 °C)] 99.4 °F (37.4 °C)  Pulse:  [75] 75  Resp:  [16] 16  SpO2:  [95 %] 95 %  BP: (160)/(71) 160/71     Weight: 113.4 kg (250 lb)  Body mass index is 36.92 kg/(m^2).    SpO2: 95 %  O2 Device (Oxygen Therapy): room air      Intake/Output Summary (Last 24 hours) at 04/02/17 0016  Last data filed at 04/02/17 0009   Gross per 24 hour   Intake                0 ml   Output              550 ml   Net             -550 ml       Lines/Drains/Airways     Peripheral Intravenous Line                 Peripheral IV - Single Lumen 04/01/17 2143 Right Antecubital less than 1 day                Physical Exam   Constitutional: He is oriented to person, place, and time. He appears well-developed.   HENT:   Head: Normocephalic and atraumatic.   Eyes: EOM are normal.   Neck: Normal range of motion.   Cardiovascular: Normal rate, regular rhythm and normal heart sounds.  Exam reveals no gallop and no friction rub.    No murmur heard.  Pulses:       Radial pulses are 2+ on the right side, and 2+ on the left side.   Pulmonary/Chest: Effort normal. He has no wheezes. He has no rales.   Abdominal: Soft. Bowel sounds are normal. He exhibits no distension. There is no tenderness. There is no rebound.   Musculoskeletal: Normal range of motion. He exhibits edema.   Neurological: He is alert and oriented to person, place, and time. He has normal strength. No cranial nerve deficit or sensory deficit.   Skin: Skin is warm.   Psychiatric: He has a normal mood and affect.       Significant Labs:   CMP   Recent  Labs  Lab 04/01/17 2142      K 2.9*   CL 97   CO2 32*   GLU 95   BUN 19   CREATININE 1.5*   CALCIUM 9.9   PROT 9.0*   ALBUMIN 3.9   BILITOT 2.2*   ALKPHOS 152*   AST 21   ALT 11   ANIONGAP 13   ESTGFRAFRICA >60.0   EGFRNONAA 53.5*   , CBC   Recent Labs  Lab 04/01/17 2142   WBC 7.78   HGB 12.4*   HCT 38.0*      , INR   Recent Labs  Lab 04/01/17 2142   INR 1.3*    and Troponin   Recent Labs  Lab 04/01/17 2142   TROPONINI 0.094*       Significant Imaging: Reviewed    Assessment and Plan:     * Acute on chronic systolic CHF (congestive heart failure)  Elevated troponin  LFTs abnormal  - ADHF in the setting of medical non-adherence, possibly worsening MR  - Tn elevation in the setting of CKD and CMP/HF; no anginal CP currently  - LFT abnormality in the setting of CMP/HF  - low clinical suspicion for PE  - IV Lasix 80 mg BID, monitor weight/UOP  - TTE with CFD (moderate MR noted on prior)  - monitor blisters on legs with diuresis  - Tn trended down w/o peak; likely 2/2 CHF  - allergy to ACEi; consider ARB  - monitor LFTs with diuresis  - carvedilol, hydral/ISDN    Atrial fibrillation, chronic  Subtherapeutic international normalized ratio (INR)  - follows with outside coumadin clinic  - increase warfarin dose  - carvedilol    Chronic kidney disease  - IV Lasix as above for hypervolemia  - allergy to ACEi; consider ARB    Coronary artery disease  - aspirin, carvedilol  - no anginal CP currently  - trend Tn as above    Hypertension  - carvedilol, hydral/ISDN    Normocytic anemia  - no overt s/s of GIB  - continue to monitor      VTE Risk Mitigation         Ordered     warfarin (COUMADIN) tablet 10 mg  Daily     Route:  Oral        04/02/17 0009     Medium Risk of VTE  Once      04/02/17 0005          Ramesh Skinner MD  Cardiology   [facility]

## 2017-04-02 NOTE — PROGRESS NOTES
Patient with c/o pain to ble. patient states he cannot take any medications containing tylenol as it causes itching. Notified dr. Skinner.order received for OXY IR 5 mg once.moted.will monitor.

## 2017-04-02 NOTE — PLAN OF CARE
Problem: Patient Care Overview  Goal: Plan of Care Review  Outcome: Ongoing (interventions implemented as appropriate)  Patient received to room 10 via wheelchair. Pt is aaox4. Vss. Oriented to room. No acute distress noted. Pt in recliner for the night . Admit assessment done. Call light within reach,.

## 2017-04-02 NOTE — PLAN OF CARE
Problem: Patient Care Overview  Goal: Plan of Care Review  Outcome: Ongoing (interventions implemented as appropriate)  Fall precaution maitained thru out shift; skin dry and intact; no sign of distress noted; fluid intact managed, pain managed with pain medication.

## 2017-04-02 NOTE — ASSESSMENT & PLAN NOTE
- ADHF in the setting of medical non-adherence, possibly worsening MR  - Tn elevation in the setting of CKD and CMP/HF; no anginal CP currently  - LFT abnormality in the setting of CMP/HF  - low clinical suspicion for PE  - IV Lasix 80 mg BID, monitor weight/UOP  - TTE with CFD (moderate MR noted on prior)  - monitor blisters on legs with diuresis  - Tn trended down w/o peak; likely 2/2 CHF  - allergy to ACEi; consider ARB  - monitor LFTs with diuresis  - carvedilol, hydral/ISDN

## 2017-04-02 NOTE — SUBJECTIVE & OBJECTIVE
Past Medical History:   Diagnosis Date    Anticoagulant long-term use     CHF (congestive heart failure)     Chronic combined systolic and diastolic congestive heart failure     Coronary artery disease     CVA (cerebrovascular accident)     Heart attack 2006    Hypertension     Hyperthyroidism, subclinical 1/2/2013    Paroxysmal atrial fibrillation     S/P ablation of atrial flutter 2008    Stroke 2009    no residual weaknesses       Past Surgical History:   Procedure Laterality Date    RADIOFREQUENCY ABLATION  01/08/2008    for atrial flutter       Review of patient's allergies indicates:   Allergen Reactions    Acetaminophen      Itching    Oxycodone-acetaminophen      Other reaction(s): Itching    Ace inhibitors Other (See Comments)     cough       No current facility-administered medications on file prior to encounter.      Current Outpatient Prescriptions on File Prior to Encounter   Medication Sig    aspirin 81 MG Chew Take 1 tablet (81 mg total) by mouth once daily.    carvedilol (COREG) 25 MG tablet Take 0.5 tablets (12.5 mg total) by mouth 2 (two) times daily.    hydrALAZINE (APRESOLINE) 25 MG tablet Take 1 tablet (25 mg total) by mouth every 8 (eight) hours.    isosorbide dinitrate (ISORDIL) 20 MG tablet Take 1 tablet (20 mg total) by mouth 3 (three) times daily.    torsemide (DEMADEX) 20 MG Tab Take 2 tablets (40 mg total) by mouth once daily. Patient reports taking 20 mg TWICE DAILY    warfarin (COUMADIN) 7.5 MG tablet Take 1 tablet (7.5 mg total) by mouth Daily.    nitroGLYCERIN (NITROSTAT) 0.4 MG SL tablet Place 1 tablet (0.4 mg total) under the tongue every 5 (five) minutes as needed for Chest pain. Tablet, Sublingual Sublingual    potassium chloride SA (K-DUR,KLOR-CON) 20 MEQ tablet Take by mouth once daily. Patient reports taking 25 mg daily    senna-docusate 8.6-50 mg (PERICOLACE) 8.6-50 mg per tablet Take 1 tablet by mouth daily as needed for Constipation.     [DISCONTINUED] ranolazine (RANEXA) 1,000 mg Tb12 Take 1 tablet (1,000 mg total) by mouth 2 (two) times daily.     Family History     Problem Relation (Age of Onset)    Alcohol abuse Father    Hypertension Mother, Father, Sister, Brother    Stroke Mother        Social History Main Topics    Smoking status: Never Smoker    Smokeless tobacco: Not on file    Alcohol use No    Drug use: No    Sexual activity: No     Review of Systems   Constitution: Negative for chills and fever.   HENT: Negative for ear discharge and headaches.    Eyes: Negative for pain and visual disturbance.   Cardiovascular: Positive for dyspnea on exertion. Negative for chest pain, irregular heartbeat, leg swelling, orthopnea, palpitations, paroxysmal nocturnal dyspnea and syncope.   Respiratory: Negative for hemoptysis, shortness of breath and wheezing.    Skin: Negative for rash and suspicious lesions.   Musculoskeletal: Negative for joint pain and muscle weakness.   Gastrointestinal: Negative for abdominal pain, diarrhea, hematemesis, hematochezia, melena, nausea and vomiting.   Genitourinary: Negative for dysuria and frequency.   Neurological: Negative for focal weakness and tremors.   Psychiatric/Behavioral: Negative for altered mental status, suicidal ideas and thoughts of violence.     Objective:     Vital Signs (Most Recent):  Temp: 99.4 °F (37.4 °C) (04/01/17 1945)  Pulse: 75 (04/01/17 1945)  Resp: 16 (04/01/17 1945)  BP: (!) 160/71 (04/01/17 1945)  SpO2: 95 % (04/01/17 1945) Vital Signs (24h Range):  Temp:  [99.4 °F (37.4 °C)] 99.4 °F (37.4 °C)  Pulse:  [75] 75  Resp:  [16] 16  SpO2:  [95 %] 95 %  BP: (160)/(71) 160/71     Weight: 113.4 kg (250 lb)  Body mass index is 36.92 kg/(m^2).    SpO2: 95 %  O2 Device (Oxygen Therapy): room air      Intake/Output Summary (Last 24 hours) at 04/02/17 0016  Last data filed at 04/02/17 0009   Gross per 24 hour   Intake                0 ml   Output              550 ml   Net             -550 ml        Lines/Drains/Airways     Peripheral Intravenous Line                 Peripheral IV - Single Lumen 04/01/17 2143 Right Antecubital less than 1 day                Physical Exam   Constitutional: He is oriented to person, place, and time. He appears well-developed.   HENT:   Head: Normocephalic and atraumatic.   Eyes: EOM are normal.   Neck: Normal range of motion.   Cardiovascular: Normal rate, regular rhythm and normal heart sounds.  Exam reveals no gallop and no friction rub.    No murmur heard.  Pulses:       Radial pulses are 2+ on the right side, and 2+ on the left side.   Pulmonary/Chest: Effort normal. He has no wheezes. He has no rales.   Abdominal: Soft. Bowel sounds are normal. He exhibits no distension. There is no tenderness. There is no rebound.   Musculoskeletal: Normal range of motion. He exhibits edema.   Neurological: He is alert and oriented to person, place, and time. He has normal strength. No cranial nerve deficit or sensory deficit.   Skin: Skin is warm.   Psychiatric: He has a normal mood and affect.       Significant Labs:   CMP   Recent Labs  Lab 04/01/17 2142      K 2.9*   CL 97   CO2 32*   GLU 95   BUN 19   CREATININE 1.5*   CALCIUM 9.9   PROT 9.0*   ALBUMIN 3.9   BILITOT 2.2*   ALKPHOS 152*   AST 21   ALT 11   ANIONGAP 13   ESTGFRAFRICA >60.0   EGFRNONAA 53.5*   , CBC   Recent Labs  Lab 04/01/17 2142   WBC 7.78   HGB 12.4*   HCT 38.0*      , INR   Recent Labs  Lab 04/01/17 2142   INR 1.3*    and Troponin   Recent Labs  Lab 04/01/17 2142   TROPONINI 0.094*       Significant Imaging: Reviewed

## 2017-04-02 NOTE — ED TRIAGE NOTES
Hamlet Terrell, a 51 y.o. male presents to the ED complaining of blisters on both lower ext since Wednesday, with swelling and weeping that started Thursday night. Denies any new CP or SOB at this time, denies any other swelling.     Chief Complaint   Patient presents with    Leg Swelling     bilateral lower extremity edema. weeping and blisters are forming     Review of patient's allergies indicates:   Allergen Reactions    Acetaminophen      Itching    Oxycodone-acetaminophen      Other reaction(s): Itching    Ace inhibitors Other (See Comments)     cough     Past Medical History:   Diagnosis Date    Anticoagulant long-term use     CHF (congestive heart failure)     Chronic combined systolic and diastolic congestive heart failure     Coronary artery disease     CVA (cerebrovascular accident)     Heart attack 2006    Hypertension     Hyperthyroidism, subclinical 1/2/2013    Paroxysmal atrial fibrillation     S/P ablation of atrial flutter 2008    Stroke 2009    no residual weaknesses     Adult Physical Assessment  LOC: Hamlet Terrell, 51 y.o. male verified via two identifiers.  The patient is awake, alert, oriented and speaking appropriately at this time.  APPEARANCE: Patient resting comfortably and appears to be in no acute distress at this time. Patient is clean and well groomed, patient's clothing is properly fastened.  SKIN:The skin is warm and dry, color consistent with ethnicity, patient has normal skin turgor and moist mucus membranes, skin intact, no breakdown or brusing noted.  MUSCULOSKELETAL: Patient moving all extremities well, no obvious swelling or deformities noted.  RESPIRATORY: Airway is open and patent, respirations are spontaneous, patient has a normal effort and rate, no accessory muscle use noted.  CARDIAC: Patient has a normal rate and rhythm, no periphreal edema noted in any extremity, capillary refill < 3 seconds in all extremities  ABDOMEN: Soft and non tender to palpation, no  abdominal distention noted. Bowel sounds present in all four quadrants.  NEUROLOGIC: Eyes open spontaneously, behavior appropriate to situation, follows commands, facial expression symmetrical, bilateral hand grasp equal and even, purposeful motor response noted, normal sensation in all extremities when touched with a finger.

## 2017-04-03 PROBLEM — E87.6 HYPOKALEMIA: Status: ACTIVE | Noted: 2017-04-03

## 2017-04-03 LAB
ALBUMIN SERPL BCP-MCNC: 3.2 G/DL
ALP SERPL-CCNC: 132 U/L
ALT SERPL W/O P-5'-P-CCNC: 8 U/L
ANION GAP SERPL CALC-SCNC: 11 MMOL/L
AST SERPL-CCNC: 15 U/L
BASOPHILS # BLD AUTO: 0.04 K/UL
BASOPHILS NFR BLD: 0.6 %
BILIRUB SERPL-MCNC: 1.2 MG/DL
BUN SERPL-MCNC: 20 MG/DL
CALCIUM SERPL-MCNC: 9.2 MG/DL
CHLORIDE SERPL-SCNC: 98 MMOL/L
CO2 SERPL-SCNC: 32 MMOL/L
CREAT SERPL-MCNC: 1.5 MG/DL
DIFFERENTIAL METHOD: ABNORMAL
EOSINOPHIL # BLD AUTO: 0.3 K/UL
EOSINOPHIL NFR BLD: 4.9 %
ERYTHROCYTE [DISTWIDTH] IN BLOOD BY AUTOMATED COUNT: 13.3 %
EST. GFR  (AFRICAN AMERICAN): >60 ML/MIN/1.73 M^2
EST. GFR  (NON AFRICAN AMERICAN): 53.5 ML/MIN/1.73 M^2
GLUCOSE SERPL-MCNC: 113 MG/DL
HCT VFR BLD AUTO: 33.7 %
HGB BLD-MCNC: 11 G/DL
INR PPP: 1.7
LYMPHOCYTES # BLD AUTO: 1.3 K/UL
LYMPHOCYTES NFR BLD: 18.2 %
MAGNESIUM SERPL-MCNC: 1.7 MG/DL
MCH RBC QN AUTO: 27.9 PG
MCHC RBC AUTO-ENTMCNC: 32.6 %
MCV RBC AUTO: 86 FL
MONOCYTES # BLD AUTO: 1.3 K/UL
MONOCYTES NFR BLD: 19.4 %
NEUTROPHILS # BLD AUTO: 3.9 K/UL
NEUTROPHILS NFR BLD: 56.8 %
PLATELET # BLD AUTO: 237 K/UL
PMV BLD AUTO: 9.9 FL
POTASSIUM SERPL-SCNC: 3.4 MMOL/L
PROT SERPL-MCNC: 7.7 G/DL
PROTHROMBIN TIME: 17.3 SEC
RBC # BLD AUTO: 3.94 M/UL
SODIUM SERPL-SCNC: 141 MMOL/L
WBC # BLD AUTO: 6.92 K/UL

## 2017-04-03 PROCEDURE — 36415 COLL VENOUS BLD VENIPUNCTURE: CPT

## 2017-04-03 PROCEDURE — 85025 COMPLETE CBC W/AUTO DIFF WBC: CPT

## 2017-04-03 PROCEDURE — 80053 COMPREHEN METABOLIC PANEL: CPT

## 2017-04-03 PROCEDURE — 25000003 PHARM REV CODE 250: Performed by: INTERNAL MEDICINE

## 2017-04-03 PROCEDURE — 63600175 PHARM REV CODE 636 W HCPCS: Performed by: INTERNAL MEDICINE

## 2017-04-03 PROCEDURE — 25000003 PHARM REV CODE 250: Performed by: HOSPITALIST

## 2017-04-03 PROCEDURE — 63600175 PHARM REV CODE 636 W HCPCS: Performed by: HOSPITALIST

## 2017-04-03 PROCEDURE — 85610 PROTHROMBIN TIME: CPT

## 2017-04-03 PROCEDURE — 99226 PR SUBSEQUENT OBSERVATION CARE,LEVEL III: CPT | Mod: ,,, | Performed by: HOSPITALIST

## 2017-04-03 PROCEDURE — 83735 ASSAY OF MAGNESIUM: CPT

## 2017-04-03 PROCEDURE — G0378 HOSPITAL OBSERVATION PER HR: HCPCS

## 2017-04-03 RX ORDER — COLCHICINE 0.6 MG/1
0.6 TABLET, FILM COATED ORAL ONCE
Status: COMPLETED | OUTPATIENT
Start: 2017-04-03 | End: 2017-04-03

## 2017-04-03 RX ORDER — HYDROMORPHONE HYDROCHLORIDE 1 MG/ML
0.5 INJECTION, SOLUTION INTRAMUSCULAR; INTRAVENOUS; SUBCUTANEOUS EVERY 6 HOURS PRN
Status: DISCONTINUED | OUTPATIENT
Start: 2017-04-03 | End: 2017-04-04

## 2017-04-03 RX ADMIN — HYDROMORPHONE HYDROCHLORIDE 0.5 MG: 1 INJECTION, SOLUTION INTRAMUSCULAR; INTRAVENOUS; SUBCUTANEOUS at 10:04

## 2017-04-03 RX ADMIN — FUROSEMIDE 80 MG: 10 INJECTION, SOLUTION INTRAMUSCULAR; INTRAVENOUS at 08:04

## 2017-04-03 RX ADMIN — HYDRALAZINE HYDROCHLORIDE 25 MG: 25 TABLET, FILM COATED ORAL at 09:04

## 2017-04-03 RX ADMIN — MORPHINE SULFATE 4 MG: 2 INJECTION, SOLUTION INTRAMUSCULAR; INTRAVENOUS at 04:04

## 2017-04-03 RX ADMIN — POTASSIUM BICARBONATE 50 MEQ: 25 TABLET, EFFERVESCENT ORAL at 08:04

## 2017-04-03 RX ADMIN — CARVEDILOL 12.5 MG: 12.5 TABLET, FILM COATED ORAL at 09:04

## 2017-04-03 RX ADMIN — OXYCODONE HYDROCHLORIDE 5 MG: 5 TABLET ORAL at 09:04

## 2017-04-03 RX ADMIN — ASPIRIN 81 MG CHEWABLE TABLET 81 MG: 81 TABLET CHEWABLE at 08:04

## 2017-04-03 RX ADMIN — WARFARIN SODIUM 10 MG: 5 TABLET ORAL at 05:04

## 2017-04-03 RX ADMIN — HYDRALAZINE HYDROCHLORIDE 25 MG: 25 TABLET, FILM COATED ORAL at 05:04

## 2017-04-03 RX ADMIN — ISOSORBIDE DINITRATE 20 MG: 10 TABLET ORAL at 09:04

## 2017-04-03 RX ADMIN — Medication 3 ML: at 02:04

## 2017-04-03 RX ADMIN — OXYCODONE HYDROCHLORIDE 5 MG: 5 TABLET ORAL at 02:04

## 2017-04-03 RX ADMIN — ISOSORBIDE DINITRATE 20 MG: 10 TABLET ORAL at 05:04

## 2017-04-03 RX ADMIN — CARVEDILOL 12.5 MG: 12.5 TABLET, FILM COATED ORAL at 08:04

## 2017-04-03 RX ADMIN — Medication 3 ML: at 07:04

## 2017-04-03 RX ADMIN — FUROSEMIDE 80 MG: 10 INJECTION, SOLUTION INTRAMUSCULAR; INTRAVENOUS at 05:04

## 2017-04-03 RX ADMIN — OXYCODONE HYDROCHLORIDE 5 MG: 5 TABLET ORAL at 03:04

## 2017-04-03 RX ADMIN — HYDROMORPHONE HYDROCHLORIDE 0.5 MG: 1 INJECTION, SOLUTION INTRAMUSCULAR; INTRAVENOUS; SUBCUTANEOUS at 05:04

## 2017-04-03 RX ADMIN — COLCHICINE 0.6 MG: 0.6 TABLET, FILM COATED ORAL at 10:04

## 2017-04-03 NOTE — PLAN OF CARE
Problem: Patient Care Overview  Goal: Plan of Care Review  Outcome: Ongoing (interventions implemented as appropriate)  poc updated. Pt free from fall. Safety maintained. Assist with adls. No distress noted.

## 2017-04-03 NOTE — PROGRESS NOTES
Progress Note  [facility] Medicine    Primary Team: [facility]  Admit Date: 4/1/2017   Length of Stay:  LOS: 0 days   SUBJECTIVE:   Reason for Admission:  Acute on chronic systolic CHF (congestive heart failure)    HPI:  The patient reports blisters in his legs starting on Wednesday. He endorses LE swelling and MUÑOZ. The patient denies bloody stools,fever, hemoptysis, salty food intake, and medical non-adherence. He denies palpitations and CP. The patient denies EtOH. He says he takes 40 mg BID of torsemide though he says his doctor wants him to take 40 mg TID. The patient is concerned about running out of the torsemide with three times a day dosing.     Interval history:    No acute events overnight.  Pt still endorses pain in bilateral legs, worst in right foot.  He is unsure if it could be gout flare, but is very tender.  Edema is improving.  Denies chest pain or SOB.    Review of Systems:  Constitutional: no fever or chills  Respiratory: no cough or shortness of breath  Cardiovascular: no chest pain or palpitations  Gastrointestinal: no nausea or vomiting, no abdominal pain or change in bowel habits  Musculoskeletal: no arthralgias or myalgias     OBJECTIVE:     Temp:  [97.4 °F (36.3 °C)-98.8 °F (37.1 °C)]   Pulse:  []   Resp:  [18]   BP: (106-129)/(58-72)   SpO2:  [94 %-96 %]  Body mass index is 36.92 kg/(m^2).  Intake/Outake:  This Shift:  I/O this shift:  In: 480 [P.O.:480]  Out: -     Net I/O past 24h:     Intake/Output Summary (Last 24 hours) at 04/03/17 1715  Last data filed at 04/03/17 1030   Gross per 24 hour   Intake              480 ml   Output                0 ml   Net              480 ml             Physical Exam:  Gen- well-developed, well-nourished, NAD  CVS- S1 and S2 present, RRR, no murmurs  Resp- CTA b/l, no work of breathing  Abd- BS+, soft, NT, ND  Ext- 2+ edema (less tight) of b/l LE.  Right mid and forefoot with warmth, tenderness and mild erythema    Laboratory:  CBC/Anemia Labs:  Coags:      Recent Labs  Lab 04/01/17 2142 04/02/17 0448 04/03/17 0452   WBC 7.78 7.21 6.92   HGB 12.4* 11.7* 11.0*   HCT 38.0* 37.2* 33.7*    228 237   MCV 86 88 86   RDW 13.1 13.4 13.3      Recent Labs  Lab 04/01/17 2142 04/02/17 0448 04/03/17  0451   INR 1.3* 1.4* 1.7*        Chemistries:     Recent Labs  Lab 04/01/17 2142 04/02/17 0448 04/03/17 0452    141 141   K 2.9* 3.5 3.4*   CL 97 100 98   CO2 32* 28 32*   BUN 19 16 20   CREATININE 1.5* 1.4 1.5*   CALCIUM 9.9 9.6 9.2   PROT 9.0* 8.5* 7.7   BILITOT 2.2* 2.1* 1.2*   ALKPHOS 152* 141* 132   ALT 11 9* 8*   AST 21 20 15   MG  --  1.8 1.7          Cardiac Enzymes: Ejection Fractions:    Recent Labs      04/01/17 2142 04/02/17 0448   TROPONINI  0.094*  0.074*    EF   Date Value Ref Range Status   04/02/2017 25 (A) 55 - 65    06/01/2016 35 (A) 55 - 65         POCT Glucose: HbA1c:    No results for input(s): POCTGLUCOSE in the last 168 hours. Hemoglobin A1C   Date Value Ref Range Status   10/17/2015 6.4 (H) 4.5 - 6.2 % Final   02/11/2007 6.3 (H) 4.5 - 6.2 % Final         Medications:  Scheduled Meds:   aspirin  81 mg Oral Daily    carvedilol  12.5 mg Oral BID    furosemide  80 mg Intravenous BID    hydrALAZINE  25 mg Oral Q8H    isosorbide dinitrate  20 mg Oral TID    sodium chloride 0.9%  3 mL Intravenous Q8H    warfarin  10 mg Oral Daily                             Continuous Infusions:   PRN Meds:.HYDROmorphone, nitroGLYCERIN, oxycodone, senna-docusate 8.6-50 mg     ASSESSMENT/PLAN:     Acute on chronic systolic CHF (congestive heart failure)  Elevated troponin  LFTs abnormal  - ADHF in the setting of medical non-adherence, possibly worsening MR  - Tn elevation in the setting of CKD and CMP/HF; no anginal CP currently  - LFT abnormality in the setting of CMP/HF, improving with diuresis  - Continue Lasix 80mg IV BID; good urine output  - monitor LE edema on legs with diuresis  - allergy to ACEi; consider ARB  - continue GDMT  otherwise with beta-blocker, hydralazine-nitrate  - ECHO 4/2/17:    1 - Eccentric hypertrophy.     2 - Severely depressed left ventricular systolic function (EF 25-30%).     3 - Left ventricular diastolic dysfunction.     4 - Biatrial enlargement.     5 - Right ventricular enlargement with mildly to moderately depressed systolic function.     6 - Pulmonary hypertension. The estimated PA systolic pressure is 45 mmHg.     7 - Mild mitral regurgitation.     8 - Mild tricuspid regurgitation.     9 - Increased central venous pressure.      Atrial fibrillation, chronic  Subtherapeutic international normalized ratio (INR)  - follows with outside coumadin clinic  - continue Warfarin  - continue carvedilol    LE Edema  -Likely 2/2 CHF exacerbation as above  -with continued pain, will check LE dopplers (negative)  -will give dose of Colchicine now and monitor for improvement to suggest gout flare; pt reports he previously took Allopurinol at home     Chronic kidney disease  - IV Lasix as above for hypervolemia  - allergy to ACEi; consider ARB     Coronary artery disease  - aspirin, carvedilol  - no anginal CP currently     Hypertension  - carvedilol, hydral/ISDN; midday dose held     Normocytic anemia  - no overt s/s of GIB  - continue to monitor    Hypokalemia  -Repleted (pt prefers K-lyte)    DVT ppx- Warfarin  CODE Status- FULL    Dispo- home in 1-2 days pending clinical improvement    Kari Berumen MD  Hospital Medicine Staff

## 2017-04-03 NOTE — PLAN OF CARE
Problem: Patient Care Overview  Goal: Plan of Care Review  Outcome: Ongoing (interventions implemented as appropriate)  Plan of care reviewed with patient and understanding verbalized. Fall reduction measures in place. Cardiac rhythm being monitored. Pt sitting in chair with legs elevated. Pain assessment performed. PRN pain medication administered. Reassessment to follow. Pt on 1500cc fluid restriction. Intake and output monitored.

## 2017-04-03 NOTE — PROGRESS NOTES
Patient's /68 with HR at 67. Okay to hold 1400 dose of Hydralazine 25 mg and Isosorbide 20 mg per Dr. Berumen.

## 2017-04-04 LAB
ALBUMIN SERPL BCP-MCNC: 3.3 G/DL
ALP SERPL-CCNC: 138 U/L
ALT SERPL W/O P-5'-P-CCNC: 6 U/L
ANION GAP SERPL CALC-SCNC: 10 MMOL/L
ANION GAP SERPL CALC-SCNC: 9 MMOL/L
AST SERPL-CCNC: 16 U/L
BASOPHILS # BLD AUTO: 0.02 K/UL
BASOPHILS NFR BLD: 0.3 %
BILIRUB SERPL-MCNC: 1.6 MG/DL
BUN SERPL-MCNC: 28 MG/DL
BUN SERPL-MCNC: 30 MG/DL
CALCIUM SERPL-MCNC: 9.3 MG/DL
CALCIUM SERPL-MCNC: 9.8 MG/DL
CHLORIDE SERPL-SCNC: 96 MMOL/L
CHLORIDE SERPL-SCNC: 97 MMOL/L
CO2 SERPL-SCNC: 31 MMOL/L
CO2 SERPL-SCNC: 32 MMOL/L
CREAT SERPL-MCNC: 1.6 MG/DL
CREAT SERPL-MCNC: 1.6 MG/DL
DIFFERENTIAL METHOD: ABNORMAL
EOSINOPHIL # BLD AUTO: 0.2 K/UL
EOSINOPHIL NFR BLD: 2.7 %
ERYTHROCYTE [DISTWIDTH] IN BLOOD BY AUTOMATED COUNT: 13.5 %
EST. GFR  (AFRICAN AMERICAN): 57.2 ML/MIN/1.73 M^2
EST. GFR  (AFRICAN AMERICAN): 57.2 ML/MIN/1.73 M^2
EST. GFR  (NON AFRICAN AMERICAN): 49.5 ML/MIN/1.73 M^2
EST. GFR  (NON AFRICAN AMERICAN): 49.5 ML/MIN/1.73 M^2
GLUCOSE SERPL-MCNC: 106 MG/DL
GLUCOSE SERPL-MCNC: 109 MG/DL
HCT VFR BLD AUTO: 33.3 %
HGB BLD-MCNC: 11 G/DL
INR PPP: 1.9
LYMPHOCYTES # BLD AUTO: 1.7 K/UL
LYMPHOCYTES NFR BLD: 22.2 %
MAGNESIUM SERPL-MCNC: 1.6 MG/DL
MCH RBC QN AUTO: 28.2 PG
MCHC RBC AUTO-ENTMCNC: 33 %
MCV RBC AUTO: 85 FL
MONOCYTES # BLD AUTO: 1.5 K/UL
MONOCYTES NFR BLD: 18.6 %
NEUTROPHILS # BLD AUTO: 4.4 K/UL
NEUTROPHILS NFR BLD: 55.9 %
PLATELET # BLD AUTO: 225 K/UL
PMV BLD AUTO: 9.9 FL
POTASSIUM SERPL-SCNC: 3.4 MMOL/L
POTASSIUM SERPL-SCNC: 3.6 MMOL/L
PROT SERPL-MCNC: 7.9 G/DL
PROTHROMBIN TIME: 18.7 SEC
RBC # BLD AUTO: 3.9 M/UL
SODIUM SERPL-SCNC: 137 MMOL/L
SODIUM SERPL-SCNC: 138 MMOL/L
WBC # BLD AUTO: 7.85 K/UL

## 2017-04-04 PROCEDURE — G0378 HOSPITAL OBSERVATION PER HR: HCPCS

## 2017-04-04 PROCEDURE — 83735 ASSAY OF MAGNESIUM: CPT

## 2017-04-04 PROCEDURE — 25000003 PHARM REV CODE 250: Performed by: INTERNAL MEDICINE

## 2017-04-04 PROCEDURE — 99232 SBSQ HOSP IP/OBS MODERATE 35: CPT | Mod: ,,, | Performed by: HOSPITALIST

## 2017-04-04 PROCEDURE — 25000003 PHARM REV CODE 250: Performed by: HOSPITALIST

## 2017-04-04 PROCEDURE — 85025 COMPLETE CBC W/AUTO DIFF WBC: CPT

## 2017-04-04 PROCEDURE — 63600175 PHARM REV CODE 636 W HCPCS: Performed by: HOSPITALIST

## 2017-04-04 PROCEDURE — 36415 COLL VENOUS BLD VENIPUNCTURE: CPT

## 2017-04-04 PROCEDURE — 80053 COMPREHEN METABOLIC PANEL: CPT

## 2017-04-04 PROCEDURE — 11000001 HC ACUTE MED/SURG PRIVATE ROOM

## 2017-04-04 PROCEDURE — 80048 BASIC METABOLIC PNL TOTAL CA: CPT

## 2017-04-04 PROCEDURE — 85610 PROTHROMBIN TIME: CPT

## 2017-04-04 PROCEDURE — 63600175 PHARM REV CODE 636 W HCPCS: Performed by: INTERNAL MEDICINE

## 2017-04-04 RX ORDER — POTASSIUM CHLORIDE 7.45 MG/ML
10 INJECTION INTRAVENOUS
Status: DISCONTINUED | OUTPATIENT
Start: 2017-04-04 | End: 2017-04-04

## 2017-04-04 RX ORDER — POTASSIUM CHLORIDE 20 MEQ/1
40 TABLET, EXTENDED RELEASE ORAL DAILY
Status: DISCONTINUED | OUTPATIENT
Start: 2017-04-04 | End: 2017-04-05

## 2017-04-04 RX ORDER — MAGNESIUM SULFATE HEPTAHYDRATE 40 MG/ML
2 INJECTION, SOLUTION INTRAVENOUS ONCE
Status: COMPLETED | OUTPATIENT
Start: 2017-04-04 | End: 2017-04-04

## 2017-04-04 RX ORDER — GABAPENTIN 300 MG/1
300 CAPSULE ORAL 2 TIMES DAILY
Status: DISCONTINUED | OUTPATIENT
Start: 2017-04-04 | End: 2017-04-05

## 2017-04-04 RX ADMIN — ISOSORBIDE DINITRATE 20 MG: 10 TABLET ORAL at 03:04

## 2017-04-04 RX ADMIN — Medication 3 ML: at 07:04

## 2017-04-04 RX ADMIN — HYDRALAZINE HYDROCHLORIDE 25 MG: 25 TABLET, FILM COATED ORAL at 09:04

## 2017-04-04 RX ADMIN — GABAPENTIN 300 MG: 300 CAPSULE ORAL at 09:04

## 2017-04-04 RX ADMIN — OXYCODONE HYDROCHLORIDE 5 MG: 5 TABLET ORAL at 05:04

## 2017-04-04 RX ADMIN — ISOSORBIDE DINITRATE 20 MG: 10 TABLET ORAL at 09:04

## 2017-04-04 RX ADMIN — FUROSEMIDE 80 MG: 10 INJECTION, SOLUTION INTRAMUSCULAR; INTRAVENOUS at 06:04

## 2017-04-04 RX ADMIN — Medication 3 ML: at 02:04

## 2017-04-04 RX ADMIN — CARVEDILOL 12.5 MG: 12.5 TABLET, FILM COATED ORAL at 09:04

## 2017-04-04 RX ADMIN — HYDRALAZINE HYDROCHLORIDE 25 MG: 25 TABLET, FILM COATED ORAL at 03:04

## 2017-04-04 RX ADMIN — HYDRALAZINE HYDROCHLORIDE 25 MG: 25 TABLET, FILM COATED ORAL at 05:04

## 2017-04-04 RX ADMIN — WARFARIN SODIUM 10 MG: 5 TABLET ORAL at 06:04

## 2017-04-04 RX ADMIN — ISOSORBIDE DINITRATE 20 MG: 10 TABLET ORAL at 05:04

## 2017-04-04 RX ADMIN — OXYCODONE HYDROCHLORIDE 5 MG: 5 TABLET ORAL at 11:04

## 2017-04-04 RX ADMIN — Medication 3 ML: at 10:04

## 2017-04-04 RX ADMIN — FUROSEMIDE 80 MG: 10 INJECTION, SOLUTION INTRAMUSCULAR; INTRAVENOUS at 08:04

## 2017-04-04 RX ADMIN — MAGNESIUM SULFATE IN WATER 2 G: 40 INJECTION, SOLUTION INTRAVENOUS at 03:04

## 2017-04-04 RX ADMIN — CARVEDILOL 12.5 MG: 12.5 TABLET, FILM COATED ORAL at 08:04

## 2017-04-04 RX ADMIN — POTASSIUM CHLORIDE 10 MEQ: 7.46 INJECTION, SOLUTION INTRAVENOUS at 06:04

## 2017-04-04 RX ADMIN — ASPIRIN 81 MG CHEWABLE TABLET 81 MG: 81 TABLET CHEWABLE at 08:04

## 2017-04-04 NOTE — PROGRESS NOTES
Pt had a 28 beat run of V tach. Pt sitting in chair eating lunch and is curretly asymptomatic. KAILEY SORIANO paged through hospital .   7180 Dr. Langford notified of situation; states he will put in for patient to have some labs and a potassium replacement.

## 2017-04-04 NOTE — PLAN OF CARE
04/04/17 1022   Discharge Assessment   Assessment Type Discharge Planning Assessment   Confirmed/corrected address and phone number on facesheet? Yes   Assessment information obtained from? Patient;Medical Record   Expected Length of Stay (days) 2   Communicated expected length of stay with patient/caregiver yes   Prior to hospitilization cognitive status: Alert/Oriented   Prior to hospitalization functional status: Independent   Current cognitive status: Alert/Oriented   Current Functional Status: Independent   Arrived From home or self-care   Lives With alone   Able to Return to Prior Arrangements yes   Is patient able to care for self after discharge? Yes   How many people do you have in your home that can help with your care after discharge? 0   Patient's perception of discharge disposition home or selfcare   Readmission Within The Last 30 Days no previous admission in last 30 days   Patient currently being followed by outpatient case management? No   Patient currently receives home health services? No   Does the patient currently use HME? Yes   Patient currently receives private duty nursing? No   Patient currently receives any other outside agency services? No   Equipment Currently Used at Home oxygen   Do you have any problems affording any of your prescribed medications? No   Is the patient taking medications as prescribed? yes   Do you have any financial concerns preventing you from receiving the healthcare you need? No   Does the patient have transportation to healthcare appointments? Yes   Transportation Available car   On Dialysis? No   Does the patient receive services at the [facility]? Yes   Are there any open cases? No   Discharge Plan A Home   Patient/Family In Agreement With Plan yes   Admitted with CHF. Lives alone and is independent in his ADLs. Plan is to DC home. No DC needs identified.

## 2017-04-04 NOTE — PLAN OF CARE
Problem: Patient Care Overview  Goal: Plan of Care Review  Outcome: Ongoing (interventions implemented as appropriate)  poc updated. Pt free from falls. Safety maintained. No distress noted.

## 2017-04-04 NOTE — PROGRESS NOTES
Pt refusing P.O potassium as well as second I.V. After hooking patient up to I.V. Magnesium pt refuses to move legs so IV pole can be plugged in. Pt suggested IV pole be plugged into another outlet. Explained to patient that plugging the pole into an outlet across the room is a risk for someone falling. Pt still refusing to allow access to the outlet.

## 2017-04-04 NOTE — PLAN OF CARE
Problem: Patient Care Overview  Goal: Plan of Care Review  Outcome: Ongoing (interventions implemented as appropriate)  Plan of care reviewed with patient and understanding verbalized. Fall reduction measures in place. Pt sitting in chair, personal items within reach. Cardiac rhythm being monitored. Lasix administered as ordered. Strict I&O performed. Pt complains of leg pain at a 8/10. PRN pain medication administered.

## 2017-04-05 PROBLEM — M79.672 HEEL PAIN, BILATERAL: Chronic | Status: ACTIVE | Noted: 2017-04-05

## 2017-04-05 PROBLEM — M79.672 HEEL PAIN, BILATERAL: Status: ACTIVE | Noted: 2017-04-05

## 2017-04-05 PROBLEM — M79.671 HEEL PAIN, BILATERAL: Chronic | Status: ACTIVE | Noted: 2017-04-05

## 2017-04-05 PROBLEM — M79.671 HEEL PAIN, BILATERAL: Status: ACTIVE | Noted: 2017-04-05

## 2017-04-05 LAB
ALBUMIN SERPL BCP-MCNC: 3.4 G/DL
ALP SERPL-CCNC: 158 U/L
ALT SERPL W/O P-5'-P-CCNC: 7 U/L
ANION GAP SERPL CALC-SCNC: 11 MMOL/L
ANION GAP SERPL CALC-SCNC: 11 MMOL/L
AST SERPL-CCNC: 16 U/L
BASOPHILS # BLD AUTO: 0.04 K/UL
BASOPHILS NFR BLD: 0.5 %
BILIRUB SERPL-MCNC: 1.4 MG/DL
BUN SERPL-MCNC: 32 MG/DL
BUN SERPL-MCNC: 32 MG/DL
CALCIUM SERPL-MCNC: 9.7 MG/DL
CALCIUM SERPL-MCNC: 9.8 MG/DL
CHLORIDE SERPL-SCNC: 95 MMOL/L
CHLORIDE SERPL-SCNC: 97 MMOL/L
CO2 SERPL-SCNC: 31 MMOL/L
CO2 SERPL-SCNC: 34 MMOL/L
CREAT SERPL-MCNC: 1.6 MG/DL
CREAT SERPL-MCNC: 1.6 MG/DL
DIFFERENTIAL METHOD: ABNORMAL
EOSINOPHIL # BLD AUTO: 0.4 K/UL
EOSINOPHIL NFR BLD: 4.7 %
ERYTHROCYTE [DISTWIDTH] IN BLOOD BY AUTOMATED COUNT: 13.4 %
EST. GFR  (AFRICAN AMERICAN): 57.2 ML/MIN/1.73 M^2
EST. GFR  (AFRICAN AMERICAN): 57.2 ML/MIN/1.73 M^2
EST. GFR  (NON AFRICAN AMERICAN): 49.5 ML/MIN/1.73 M^2
EST. GFR  (NON AFRICAN AMERICAN): 49.5 ML/MIN/1.73 M^2
GLUCOSE SERPL-MCNC: 104 MG/DL
GLUCOSE SERPL-MCNC: 92 MG/DL
HCT VFR BLD AUTO: 35.3 %
HGB BLD-MCNC: 11.6 G/DL
INR PPP: 1.6
LYMPHOCYTES # BLD AUTO: 1.7 K/UL
LYMPHOCYTES NFR BLD: 21.8 %
MAGNESIUM SERPL-MCNC: 2.2 MG/DL
MCH RBC QN AUTO: 28.2 PG
MCHC RBC AUTO-ENTMCNC: 32.9 %
MCV RBC AUTO: 86 FL
MONOCYTES # BLD AUTO: 1.6 K/UL
MONOCYTES NFR BLD: 19.9 %
NEUTROPHILS # BLD AUTO: 4.1 K/UL
NEUTROPHILS NFR BLD: 52.8 %
PLATELET # BLD AUTO: 235 K/UL
PMV BLD AUTO: 9.8 FL
POTASSIUM SERPL-SCNC: 3.4 MMOL/L
POTASSIUM SERPL-SCNC: 4 MMOL/L
PROT SERPL-MCNC: 8.7 G/DL
PROTHROMBIN TIME: 16.6 SEC
RBC # BLD AUTO: 4.12 M/UL
SODIUM SERPL-SCNC: 139 MMOL/L
SODIUM SERPL-SCNC: 140 MMOL/L
WBC # BLD AUTO: 7.79 K/UL

## 2017-04-05 PROCEDURE — 36415 COLL VENOUS BLD VENIPUNCTURE: CPT

## 2017-04-05 PROCEDURE — 63600175 PHARM REV CODE 636 W HCPCS: Performed by: INTERNAL MEDICINE

## 2017-04-05 PROCEDURE — 97165 OT EVAL LOW COMPLEX 30 MIN: CPT

## 2017-04-05 PROCEDURE — 80053 COMPREHEN METABOLIC PANEL: CPT

## 2017-04-05 PROCEDURE — 25000003 PHARM REV CODE 250: Performed by: HOSPITALIST

## 2017-04-05 PROCEDURE — 85025 COMPLETE CBC W/AUTO DIFF WBC: CPT

## 2017-04-05 PROCEDURE — 11000001 HC ACUTE MED/SURG PRIVATE ROOM

## 2017-04-05 PROCEDURE — 80048 BASIC METABOLIC PNL TOTAL CA: CPT

## 2017-04-05 PROCEDURE — 97161 PT EVAL LOW COMPLEX 20 MIN: CPT

## 2017-04-05 PROCEDURE — G0378 HOSPITAL OBSERVATION PER HR: HCPCS

## 2017-04-05 PROCEDURE — 99232 SBSQ HOSP IP/OBS MODERATE 35: CPT | Mod: ,,, | Performed by: PODIATRIST

## 2017-04-05 PROCEDURE — 25000003 PHARM REV CODE 250: Performed by: INTERNAL MEDICINE

## 2017-04-05 PROCEDURE — 85610 PROTHROMBIN TIME: CPT

## 2017-04-05 PROCEDURE — 83735 ASSAY OF MAGNESIUM: CPT

## 2017-04-05 PROCEDURE — 99232 SBSQ HOSP IP/OBS MODERATE 35: CPT | Mod: ,,, | Performed by: HOSPITALIST

## 2017-04-05 PROCEDURE — 94761 N-INVAS EAR/PLS OXIMETRY MLT: CPT

## 2017-04-05 RX ORDER — GABAPENTIN 400 MG/1
400 CAPSULE ORAL 2 TIMES DAILY
Status: DISCONTINUED | OUTPATIENT
Start: 2017-04-05 | End: 2017-04-06 | Stop reason: HOSPADM

## 2017-04-05 RX ORDER — AMOXICILLIN 250 MG
1 CAPSULE ORAL 2 TIMES DAILY
Status: DISCONTINUED | OUTPATIENT
Start: 2017-04-05 | End: 2017-04-06 | Stop reason: HOSPADM

## 2017-04-05 RX ORDER — POLYETHYLENE GLYCOL 3350 17 G/17G
17 POWDER, FOR SOLUTION ORAL DAILY
Status: DISCONTINUED | OUTPATIENT
Start: 2017-04-05 | End: 2017-04-06 | Stop reason: HOSPADM

## 2017-04-05 RX ORDER — OXYMETAZOLINE HCL 0.05 %
2 SPRAY, NON-AEROSOL (ML) NASAL 2 TIMES DAILY
Status: DISCONTINUED | OUTPATIENT
Start: 2017-04-05 | End: 2017-04-06 | Stop reason: HOSPADM

## 2017-04-05 RX ADMIN — POLYETHYLENE GLYCOL 3350 17 G: 17 POWDER, FOR SOLUTION ORAL at 01:04

## 2017-04-05 RX ADMIN — ISOSORBIDE DINITRATE 20 MG: 10 TABLET ORAL at 06:04

## 2017-04-05 RX ADMIN — GABAPENTIN 400 MG: 400 CAPSULE ORAL at 09:04

## 2017-04-05 RX ADMIN — ASPIRIN 81 MG CHEWABLE TABLET 81 MG: 81 TABLET CHEWABLE at 09:04

## 2017-04-05 RX ADMIN — POTASSIUM BICARBONATE 50 MEQ: 25 TABLET, EFFERVESCENT ORAL at 01:04

## 2017-04-05 RX ADMIN — ISOSORBIDE DINITRATE 20 MG: 10 TABLET ORAL at 09:04

## 2017-04-05 RX ADMIN — OXYMETAZOLINE HYDROCHLORIDE 2 SPRAY: 5 SPRAY NASAL at 09:04

## 2017-04-05 RX ADMIN — FUROSEMIDE 80 MG: 10 INJECTION, SOLUTION INTRAMUSCULAR; INTRAVENOUS at 09:04

## 2017-04-05 RX ADMIN — Medication 3 ML: at 06:04

## 2017-04-05 RX ADMIN — CARVEDILOL 12.5 MG: 12.5 TABLET, FILM COATED ORAL at 09:04

## 2017-04-05 RX ADMIN — OXYCODONE HYDROCHLORIDE 5 MG: 5 TABLET ORAL at 06:04

## 2017-04-05 RX ADMIN — Medication 3 ML: at 02:04

## 2017-04-05 RX ADMIN — HYDRALAZINE HYDROCHLORIDE 25 MG: 25 TABLET, FILM COATED ORAL at 09:04

## 2017-04-05 RX ADMIN — Medication 3 ML: at 10:04

## 2017-04-05 RX ADMIN — HYDRALAZINE HYDROCHLORIDE 25 MG: 25 TABLET, FILM COATED ORAL at 06:04

## 2017-04-05 RX ADMIN — GABAPENTIN 300 MG: 300 CAPSULE ORAL at 09:04

## 2017-04-05 RX ADMIN — ISOSORBIDE DINITRATE 20 MG: 10 TABLET ORAL at 01:04

## 2017-04-05 RX ADMIN — HYDRALAZINE HYDROCHLORIDE 25 MG: 25 TABLET, FILM COATED ORAL at 01:04

## 2017-04-05 NOTE — PROGRESS NOTES
Pt refusing various aspects of inpatient care. Discussed with team during huddle. Pt will not be offered enrollment in [facility] program d/t noncompliance.    Removed from HF list.

## 2017-04-05 NOTE — PLAN OF CARE
Problem: Patient Care Overview  Goal: Plan of Care Review  Outcome: Ongoing (interventions implemented as appropriate)  Rounding every 2 hours to reduce the risk of falls.  Monitoring intake of fluid to reduce risk of fluid volume excess and reduce risk of skin integrity impairment.

## 2017-04-05 NOTE — PT/OT/SLP EVAL
Physical Therapy  Evaluation/DISCHARGE    Hamlet Terrell   MRN: [id number]   Admitting Diagnosis: Acute on chronic systolic CHF (congestive heart failure)    PT Received On: 17  PT Start Time: 907     PT Stop Time: 927    PT Total Time (min): 20 min       Billable Minutes:  Evaluation 20    Diagnosis: Acute on chronic systolic CHF (congestive heart failure)      Past Medical History:   Diagnosis Date    Anticoagulant long-term use     CHF (congestive heart failure)     Chronic combined systolic and diastolic congestive heart failure     Coronary artery disease     CVA (cerebrovascular accident)     Heart attack     Hypertension     Hyperthyroidism, subclinical 2013    Paroxysmal atrial fibrillation     S/P ablation of atrial flutter     Stroke 2009    no residual weaknesses      Past Surgical History:   Procedure Laterality Date    RADIOFREQUENCY ABLATION  2008    for atrial flutter       Referring physician: José Luis Langford MD  Date referred to PT: 17    General Precautions: Standard, fall  Orthopedic Precautions: N/A   Braces: N/A       Do you have any cultural, spiritual, Sabianism conflicts, given your current situation?: none    Patient History:  Lives With: alone  Living Arrangements: house  Home Accessibility: stairs to enter home  Home Layout: Able to live on 1st floor  Number of Stairs to Enter Home: 1  Stair Railings at Home: none  Transportation Available: car  Equipment Currently Used at Home: none  DME owned (not currently used): none    Previous Level of Function:  Ambulation Skills: independent  Transfer Skills: independent  ADL Skills: independent  Work/Leisure Activity: independent    Subjective:  Communicated with nursing prior to session.    Chief Complaint: LE pain   Patient goals: to be left alone    Pain Ratin/10 (0/10 pain at rest, pt reports 10/10 pain with walking)   Location - Side: Bilateral     Location: leg          Objective:   Patient found  with: peripheral IV     Cognitive Exam:  Oriented to: Person, Place, Time and Situation    Follows Commands/attention: Follows multistep  Commands - when the pt wants to   Communication: clear/fluent  Safety awareness/insight to disability: intact    Physical Exam:  Postural examination/scapula alignment: No postural abnormalities identified    Skin integrity: blistering distal aspect of LEs  Edema: Mild     Sensation:   intact    Upper Extremity Range of Motion:  Right Upper Extremity: WFL  Left Upper Extremity: WFL    Upper Extremity Strength:  Right Upper Extremity: WFL  Left Upper Extremity: WFL    Lower Extremity Range of Motion:  Right Lower Extremity: WFL  Left Lower Extremity: WFL    Lower Extremity Strength:  Right Lower Extremity: WFL  Left Lower Extremity: WFL     Fine motor coordination:  Intact    Gross motor coordination: WFL    Functional Mobility:  Bed Mobility:       Transfers:  Sit <> Stand Assistance: Independent  Sit <> Stand Assistive Device: No Assistive Device    Gait:   Gait Distance: Pt refused    Balance:   Static Sit: NORMAL: No deviations seen in posture held statically  Dynamic Sit: NORMAL: No deviations seen in posture held dynamically  Static Stand: NORMAL: No deviations seen in posture held statically  Dynamic stand: 0: N/A - PT did not observe    Therapeutic Activities and Exercises:  Pressure relief ed provided to pt  Pt refused marching in place in stance   Refused all therex to be perf in sit  Refused B foot elevation for fluid reduction    AM-PAC 6 CLICK MOBILITY  How much help from another person does this patient currently need?   1 = Unable, Total/Dependent Assistance  2 = A lot, Maximum/Moderate Assistance  3 = A little, Minimum/Contact Guard/Supervision  4 = None, Modified [place]/Independent    Turning over in bed (including adjusting bedclothes, sheets and blankets)?: 2  Sitting down on and standing up from a chair with arms (e.g., wheelchair, bedside commode, etc.):  4  Moving from lying on back to sitting on the side of the bed?: 2  Moving to and from a bed to a chair (including a wheelchair)?: 2  Need to walk in hospital room?: 2  Climbing 3-5 steps with a railing?: 2  Total Score: 14     AM-PAC Raw Score CMS G-Code Modifier Level of Impairment Assistance   6 % Total / Unable   7 - 9 CM 80 - 100% Maximal Assist   10 - 14 CL 60 - 80% Moderate Assist   15 - 19 CK 40 - 60% Moderate Assist   20 - 22 CJ 20 - 40% Minimal Assist   23 CI 1-20% SBA / CGA   24 CH 0% Independent/ Mod I     Patient left up in chair with all lines intact, call button in reach and nursing notified.    Assessment:   Hamlet Terrell is a 51 y.o. male with a medical diagnosis of Acute on chronic systolic CHF (congestive heart failure) and presents with B LE edema and blistering of the distal lower legs.  Pt was unwilling to participate in skilled PT services today.  Pt refused to follow PT commands and would not answer PTs questions.  Pt stated that he has been non-ambulatory for the past 4 days and does not wish to amb sec to pain.  Pt refused to perf any therex ed or pressure relief tech.  PT explained that if the pt is not willing to follow POC then PT services will not be provided, pt refused services.  Though pt would benefit from skilled PT services, pt is to be discharged at this time at the pts request.    Rehab identified problem list/impairments: Rehab identified problem list/impairments: pain    Rehab potential is good.    Activity tolerance: Poor    Discharge recommendations: Discharge Facility/Level Of Care Needs: home     Barriers to discharge: Barriers to Discharge: Decreased caregiver support    Equipment recommendations: Equipment Needed After Discharge: none     GOALS:   Physical Therapy Goals     Not on file          PLAN:    Patient to be seen   to address the above listed problems via    Plan of Care expires:    Plan of Care reviewed with: patient        Ruth Peguero  PT  04/05/2017

## 2017-04-05 NOTE — PT/OT/SLP EVAL
"Occupational Therapy  Evaluation    Hmalet Terrell   MRN: [id number]   Admitting Diagnosis: Acute on chronic systolic CHF (congestive heart failure)    OT Date of Treatment: 04/05/17   OT Start Time: 1143  OT Stop Time: 1153  OT Total Time (min): 10 min    Billable Minutes:  Evaluation 10    Diagnosis: Acute on chronic systolic CHF (congestive heart failure)     Past Medical History:   Diagnosis Date    Anticoagulant long-term use     CHF (congestive heart failure)     Chronic combined systolic and diastolic congestive heart failure     Coronary artery disease     CVA (cerebrovascular accident)     Heart attack 2006    Hypertension     Hyperthyroidism, subclinical 1/2/2013    Paroxysmal atrial fibrillation     S/P ablation of atrial flutter 2008    Stroke 2009    no residual weaknesses      Past Surgical History:   Procedure Laterality Date    RADIOFREQUENCY ABLATION  01/08/2008    for atrial flutter       Referring physician: Dr. Langford  Date referred to OT: 4/4/2017    General Precautions: Standard, fall  Orthopedic Precautions: N/A  Braces: N/A    Do you have any cultural, spiritual, Druze conflicts, given your current situation?: None     Patient History:  Living Environment  Lives With: alone  Living Arrangements: house  Home Layout: Able to live on 1st floor  Living Environment Comment: Pt reports living alone in 1-story house with no ANAID. Pt will have access to tub-shower with no DME. PTA, pt was (I) with ADLs and functional mobility. Pt will have no (A) upon D/C.   Equipment Currently Used at Home: none    Subjective:  Communicated with RN (Malathi) prior to session.    Chief Complaint: "I don't really need this. I'm just in so much pain."  Patient/Family stated goals: pain relief, to go home    Pain Rating:  ("More than a 10")  Location - Side: Bilateral  Location - Orientation: generalized  Location: leg  Pain Addressed: Reposition, Distraction, Nurse notified       Objective:  Pt already " seated in bedside chair upon arrival.     Cognitive Exam:  Oriented to: Person, Place, Time and Situation  Follows Commands/attention: Follows multistep  commands  Communication: clear/fluent  Memory:  No Deficits noted  Safety awareness/insight to disability: impaired  Coping skills/emotional control: Appropriate to situation, required encouragement to participate    Visual/perceptual:  Intact    Physical Exam:  Postural examination/scapula alignment: No postural abnormalities identified  Skin integrity: blisters and scabbing along anterior aspect of BLE  Edema: Moderate BLE    Sensation:   Pt reports numbness in (R) hand.     Upper Extremity Range of Motion:  Right Upper Extremity: WNL  Left Upper Extremity: WNL    Upper Extremity Strength:  Right Upper Extremity: WNL  Left Upper Extremity: WNL   Strength: WNL    Fine motor coordination:   Intact    Gross motor coordination: WFL    Functional Mobility:  Bed Mobility: Pt already seated in bedside chair upon arrival.     Transfers:  Sit <> Stand Assistance: Stand By Assistance  Sit <> Stand Assistive Device: No Assistive Device    Functional Ambulation: pt completed functional mobility at short-community distance level using no AD with SBA. Pt w/ guarded and unsteady gait 2/2 sig pain in BLE.     Activities of Daily Living:     UE Dressing Level of Assistance: Independent (reports donning overhead shirt earlier with no (A))  UE adaptive equipment: None    LE Dressing Level of Assistance: Stand by assistance (to doff/don sock using crossed-leg technique- most limited by pain)  LE adaptive equipment: None    Balance:   Static Sit: GOOD: Takes MODERATE challenges from all directions  Dynamic Sit: GOOD: Maintains balance through MODERATE excursions of active trunk movement  Static Stand: FAIR+: Takes MINIMAL challenges from all directions  Dynamic stand: FAIR+: Needs CLOSE SUPERVISION during gait and is able to right self with minor LOB    Therapeutic Activities and  "Exercises:  Pt educated on OT role and POC.     AM-PAC 6 CLICK ADL  How much help from another person does this patient currently need?  1 = Unable, Total/Dependent Assistance  2 = A lot, Maximum/Moderate Assistance  3 = A little, Minimum/Contact Guard/Supervision  4 = None, Modified [place]/Independent    Putting on and taking off regular lower body clothing? : 3  Bathing (including washing, rinsing, drying)?: 3  Toileting, which includes using toilet, bedpan, or urinal? : 3  Putting on and taking off regular upper body clothing?: 4  Taking care of personal grooming such as brushing teeth?: 4  Eating meals?: 4  Total Score: 21    AM-PAC Raw Score CMS "G-Code Modifier Level of Impairment Assistance   6 % Total / Unable   7 - 9 CM 80 - 100% Maximal Assist   10 - 14 CL 60 - 80% Moderate Assist   15 - 19 CK 40 - 60% Moderate Assist   20 - 22 CJ 20 - 40% Minimal Assist   23 CI 1-20% SBA / CGA   24 CH 0% Independent/ Mod I       Patient left up in chair with all lines intact, call button in reach and RN notified    Assessment:  Hamlet Terrell is a 51 y.o. male with a medical diagnosis of Acute on chronic systolic CHF (congestive heart failure) and presents with below stated deficits which are impacting his (I)/safety with ADLs and functional mobility. Pt is currently ambulating short, house-hold level distances with SBA and completing ADLs with SBA-independently. Pt is most limited by pain and numbness in (R) hand. Feel that pt would benefit from acute OT services to further address stated deficits and maximize return to PLOF. Once pt's pain is better managed, anticipate he will return to his PLOF quickly. Therefore no further therapy services recommended upon D/C. No DME needs.     Rehab identified problem list/impairments: Rehab identified problem list/impairments: pain, impaired endurance, weakness, impaired functional mobilty, impaired self care skills, gait instability, impaired balance, impaired skin, " edema, decreased safety awareness    Rehab potential is good.    Activity tolerance: Fair    Discharge recommendations: Discharge Facility/Level Of Care Needs: home     Barriers to discharge: Barriers to Discharge: Decreased caregiver support    Equipment recommendations: none     GOALS:   Occupational Therapy Goals        Problem: Occupational Therapy Goal    Goal Priority Disciplines Outcome Interventions   Occupational Therapy Goal     OT, PT/OT Ongoing (interventions implemented as appropriate)    Description:  Goals to be met by: 4/12/2017     Patient will increase functional independence with ADLs by performing:    LE Dressing with Modified [place].  Toileting from toilet with Modified [place] for hygiene and clothing management.   Toilet transfer to toilet with Modified [place].  Pt will complete functional mobility at house-hold level distance in prep for ADLs with mod (I).                 PLAN:  Patient to be seen 3 x/week to address the above listed problems via self-care/home management, therapeutic activities, therapeutic exercises  Plan of Care expires: 05/05/17  Plan of Care reviewed with: patient         Eboni Cagle, OTR/L  04/05/2017

## 2017-04-05 NOTE — PLAN OF CARE
Problem: Patient Care Overview  Goal: Plan of Care Review  Outcome: Ongoing (interventions implemented as appropriate)  Plan of care reviewed with patient and understanding verbalized. Fall reduction measures in place, pt up in chair, wheels locked. Cardiac rhythm being monitored. Diuretics and electrolyte replacements administered as ordered. Pain assessment performed.

## 2017-04-05 NOTE — PROGRESS NOTES
"Attempted to administer patient's medication but patient refusing to acknowledge my presence in the room. Same situation occurred on yesterday. Patient stated " I heard you calling my name to give me my medication but I did not answer you".  When asked why he did not answer patient states "I don't have to answer when I'm sleeping and you should have tapped me. [facility] [place] .   [id number] Dr. Langford notified of above.  "

## 2017-04-05 NOTE — PLAN OF CARE
Problem: Occupational Therapy Goal  Goal: Occupational Therapy Goal  Goals to be met by: 4/12/2017     Patient will increase functional independence with ADLs by performing:    LE Dressing with Modified [place].  Toileting from toilet with Modified [place] for hygiene and clothing management.   Toilet transfer to toilet with Modified [place].  Pt will complete functional mobility at house-hold level distance in prep for ADLs with mod (I).   Outcome: Ongoing (interventions implemented as appropriate)  OT eval completed and goals set.      Eboni Cagle, OTR/L  4/5/2017

## 2017-04-05 NOTE — PROGRESS NOTES
Patient had a 7 beat run of V tach. Patient is asymptomatic and vitals are stable. [facility] [place] paged through hospital . [id number] Dr. Langford notified.

## 2017-04-05 NOTE — PROGRESS NOTES
Patient currently having a nosebleed which he states has been happening on and off since last night. Pt instructed to hold pressure to R nare. Pt refusing to hold pressure but continues to place tissue in his nose to catch blood. [facility] [place] paged.

## 2017-04-05 NOTE — PROGRESS NOTES
Dr. Langford notified that patient cannot go to MRI with tele monitor and that a nurse needs to be present. Dr. Langford states it is okay for patient to take off tele monitor and monitor to be replaced once pt returns to unit. Nursing communication to be put in by MD.

## 2017-04-05 NOTE — PROGRESS NOTES
Call to MD re: order , medication administration not complete and pt c/o arm hurting d/t medication. MD will DC KCl order for now and check recent lab results.

## 2017-04-05 NOTE — PROGRESS NOTES
Progress Note  Hospital Medicine    Primary Team: [facility] HOSP MED C  Admit Date: 4/1/2017   Length of Stay:  LOS: 0 days   SUBJECTIVE:   Reason for Admission:  Acute on chronic systolic CHF (congestive heart failure)    HPI:  The patient reports blisters in his legs starting on Wednesday. He endorses LE swelling and MUÑOZ. The patient denies bloody stools,fever, hemoptysis, salty food intake, and medical non-adherence. He denies palpitations and CP. The patient denies EtOH. He says he takes 40 mg BID of torsemide though he says his doctor wants him to take 40 mg TID. The patient is concerned about running out of the torsemide with three times a day dosing.     Interval history:    No acute events overnight. Patient with ongoing LE pain and swelling, patient receiving Oxycodone and IV dilaudid for pain control, he reports colchicine did not help     He states he is still having high urine output in response to lasix     Notified approx 3-4pm that pt with 28 beat run of VTACH, electrolyte repletion ordered.     Review of Systems:  Constitutional: no fever or chills  Respiratory: no cough or shortness of breath  Cardiovascular: no chest pain or palpitations  Gastrointestinal: no nausea or vomiting, no abdominal pain or change in bowel habits  Musculoskeletal: no arthralgias or myalgias     OBJECTIVE:     Temp:  [97.8 °F (36.6 °C)-98.4 °F (36.9 °C)]   Pulse:  []   Resp:  [18]   BP: (101-135)/(59-71)   SpO2:  [94 %-96 %]  Body mass index is 36.92 kg/(m^2).  Intake/Outake:  This Shift:       Net I/O past 24h:     Intake/Output Summary (Last 24 hours) at 04/04/17 1933  Last data filed at 04/04/17 1600   Gross per 24 hour   Intake             1040 ml   Output                0 ml   Net             1040 ml             Physical Exam:  Gen- well-developed, well-nourished, NAD  CVS- S1 and S2 present, irregular rhythm, murmur present  Resp- CTA b/l, no increased work of breathing  Abd- BS+, soft, NT, ND  Ext- 2+ edema (less  tight) of b/l LE.  Bilateral heel pain, ankles with taut edema, patient with excoriations over the anterior surface of shins    Laboratory:  CBC/Anemia Labs: Coags:      Recent Labs  Lab 04/02/17 0448 04/03/17 0452 04/04/17 0514   WBC 7.21 6.92 7.85   HGB 11.7* 11.0* 11.0*   HCT 37.2* 33.7* 33.3*    237 225   MCV 88 86 85   RDW 13.4 13.3 13.5      Recent Labs  Lab 04/02/17 0448 04/03/17 0451 04/04/17 0514   INR 1.4* 1.7* 1.9*        Chemistries:     Recent Labs  Lab 04/02/17 0448 04/03/17 0452 04/04/17 0514    141 137   K 3.5 3.4* 3.4*    98 97   CO2 28 32* 31*   BUN 16 20 28*   CREATININE 1.4 1.5* 1.6*   CALCIUM 9.6 9.2 9.3   PROT 8.5* 7.7 7.9   BILITOT 2.1* 1.2* 1.6*   ALKPHOS 141* 132 138*   ALT 9* 8* 6*   AST 20 15 16   MG 1.8 1.7 1.6          Cardiac Enzymes: Ejection Fractions:    Recent Labs      04/01/17 2142 04/02/17 0448   TROPONINI  0.094*  0.074*    EF   Date Value Ref Range Status   04/02/2017 25 (A) 55 - 65    06/01/2016 35 (A) 55 - 65         POCT Glucose: HbA1c:    No results for input(s): POCTGLUCOSE in the last 168 hours. Hemoglobin A1C   Date Value Ref Range Status   10/17/2015 6.4 (H) 4.5 - 6.2 % Final   02/11/2007 6.3 (H) 4.5 - 6.2 % Final         Medications:  Scheduled Meds:   aspirin  81 mg Oral Daily    carvedilol  12.5 mg Oral BID    furosemide  80 mg Intravenous BID    gabapentin  300 mg Oral BID    hydrALAZINE  25 mg Oral Q8H    isosorbide dinitrate  20 mg Oral TID    potassium chloride  10 mEq Intravenous Q1H    potassium chloride  40 mEq Oral Daily    sodium chloride 0.9%  3 mL Intravenous Q8H    warfarin  10 mg Oral Daily                             Continuous Infusions:   PRN Meds:.nitroGLYCERIN, oxycodone, senna-docusate 8.6-50 mg     ASSESSMENT/PLAN:     Acute on chronic systolic CHF (congestive heart failure)  Elevated troponin  LFTs abnormal  - ADHF in the setting of medical non-adherence, possibly worsening MR  - Tn elevation in the  setting of CKD and CMP/HF; no anginal CP currently and troponin levels have peaked.- LFT abnormality in the setting of CMP/HF, improving with diuresis  - Continue Lasix 80mg IV BID; good urine output  - monitor LE edema on legs with diuresis  - allergy to ACEi; consider ARB  - continue GDMT otherwise with beta-blocker, hydralazine-nitrate  - ECHO 4/2/17:    1 - Eccentric hypertrophy.     2 - Severely depressed left ventricular systolic function (EF 25-30%).     3 - Left ventricular diastolic dysfunction.     4 - Biatrial enlargement.     5 - Right ventricular enlargement with mildly to moderately depressed systolic function.     6 - Pulmonary hypertension. The estimated PA systolic pressure is 45 mmHg.     7 - Mild mitral regurgitation.     8 - Mild tricuspid regurgitation.     9 - Increased central venous pressure.      Atrial fibrillation, chronic  Subtherapeutic international normalized ratio (INR)  - follows with outside coumadin clinic  - continue Warfarin  - continue carvedilol    LE Edema & Pain  -Likely 2/2 CHF exacerbation as above  -with continued pain, will check LE dopplers (negative)  -no improvement with colchicine   -d/c IV dilaudid and start gabapentin, patient reported with ongoing bilateral extremity pain. Will plan to taper oxycodone in AM.   -Presences of bilateral LE pain noted in prior d/c summaries in association with CHF exacerbation, has been ruled out for DVT has prior arterial ultrasound negative for arterial insufficiency.      Chronic kidney disease  - IV Lasix as above for hypervolemia  - allergy to ACEi; consider ARB     Coronary artery disease  - aspirin, carvedilol  - no anginal CP currently     Hypertension  - carvedilol, hydral/ISDN; midday dose held     Normocytic anemia  - no overt s/s of GIB  - continue to monitor    Hypokalemia  -Repleted (pt prefers K-lyte)    DVT ppx- Warfarin  CODE Status- FULL    Dispo- home in 1-2 days pending clinical improvement        José Luis Langford  M.D.  Attending [facility] Medicine Dept.  Pager: [phone]

## 2017-04-05 NOTE — CONSULTS
Consult Note  Podiatry    Consult Requested By: José Luis Langford MD  Reason for Consult: chronic heel pain bilateral in setting of CHF    SUBJECTIVE     History of Present Illness:  Hamlet Terrell is a 51 y.o. male who  has a past medical history of Anticoagulant long-term use; CHF (congestive heart failure); Chronic combined systolic and diastolic congestive heart failure; Coronary artery disease; CVA (cerebrovascular accident); Heart attack; Hypertension; Hyperthyroidism, subclinical; Paroxysmal atrial fibrillation; S/P ablation of atrial flutter; and Stroke.     Patient states that he has severe 10/10 pain to the bilateral posterior heels L>R. The pain has been presents since about Saturday and he is unable to weight bear on them since, therefor unable to participate with PT. He is inpatient with CHF and severe LE edema, he notes that the pain has been present ever since the edema started to resolve. He denies trauma. The pain is exacerbated with standing and walking, but there is pain even when sitting in the chair.      Scheduled Meds:   aspirin  81 mg Oral Daily    carvedilol  12.5 mg Oral BID    furosemide  80 mg Intravenous BID    gabapentin  400 mg Oral BID    hydrALAZINE  25 mg Oral Q8H    isosorbide dinitrate  20 mg Oral TID    oxymetazoline  2 spray Each Nare BID    polyethylene glycol  17 g Oral Daily    senna-docusate 8.6-50 mg  1 tablet Oral BID    sodium chloride 0.9%  3 mL Intravenous Q8H    warfarin  12 mg Oral Daily     Continuous Infusions:   PRN Meds:nitroGLYCERIN, oxycodone    Review of patient's allergies indicates:   Allergen Reactions    Acetaminophen      Itching    Oxycodone-acetaminophen      Other reaction(s): Itching    Ace inhibitors Other (See Comments)     cough        Past Medical History:   Diagnosis Date    Anticoagulant long-term use     CHF (congestive heart failure)     Chronic combined systolic and diastolic congestive heart failure     Coronary artery  disease     CVA (cerebrovascular accident)     Heart attack 2006    Hypertension     Hyperthyroidism, subclinical 1/2/2013    Paroxysmal atrial fibrillation     S/P ablation of atrial flutter 2008    Stroke 2009    no residual weaknesses     Past Surgical History:   Procedure Laterality Date    RADIOFREQUENCY ABLATION  01/08/2008    for atrial flutter     Family History   Problem Relation Age of Onset    Hypertension Mother     Stroke Mother     Hypertension Father     Alcohol abuse Father     Hypertension Sister     Hypertension Brother      Social History   Substance Use Topics    Smoking status: Never Smoker    Smokeless tobacco: None    Alcohol use No       Review of Systems:  Constitutional: no fever or chills  Respiratory: no cough or shortness of breath  Cardiovascular: no chest pain or palpitations  Gastrointestinal: no nausea or vomiting, tolerating diet  Musculoskeletal: no arthralgias or myalgias  Neurological: no history of numbness or paresthesias in the feet    OBJECTIVE     Vital Signs (Most Recent):  Temp: 98.8 °F (37.1 °C) (04/05/17 1300)  Pulse: 70 (04/05/17 1500)  Resp: 18 (04/05/17 1300)  BP: (!) 114/53 (04/05/17 1300)  SpO2: 95 % (04/05/17 1300)    Vital Signs Range (Last 24H):  Temp:  [98.1 °F (36.7 °C)-99.4 °F (37.4 °C)]   Pulse:  [66-92]   Resp:  [18]   BP: (108-117)/(53-64)   SpO2:  [95 %-96 %]     Physical Exam:  General: Oriented to person, place, time, and situation. No acute distress.  Vascular: DP and PT pulses are 1+ bilaterally. CRT<3 seconds to digits 1-5 bilaterally. Moderate LE edema bilateral feet and ankles  Musculoskeletal: Equinus noted b/l ankles with 5 deg DF noted with guarding secondary to pain. Severe pain with palpation to the posterior heel along the skin over the distal achilles tendon.  Neuro: Light touch, proprioception, and sharp/dull sensation are all intact bilaterally.    Derm: No open lesions, lacerations or wounds noted. Skin is warm to touch  proximal with normal distal cooling.     Laboratory:  CBC:   Recent Labs  Lab 04/05/17 0512   WBC 7.79   RBC 4.12*   HGB 11.6*   HCT 35.3*      MCV 86   MCH 28.2   MCHC 32.9     CMP:   Recent Labs  Lab 04/05/17 0512      CALCIUM 9.8   ALBUMIN 3.4*   PROT 8.7*      K 3.4*   CO2 31*   CL 97   BUN 32*   CREATININE 1.6*   ALKPHOS 158*   ALT 7*   AST 16   BILITOT 1.4*     ESR: No results for input(s): SEDRATE in the last 168 hours.  CRP: No results for input(s): CRP in the last 168 hours.    Diagnostic Results:  X-Ray: pending  MRI: pending        ASSESSMENT/PLAN     Assessment:  -Bilateral posterior heel pain   -CHF  -Severe LE edema, edema possible etiology of pain    Plan:  -Pain is 10/10 out of proportion to exam, he barely allows me to touch the posterior heels. MRI to rule out Achilles tendinosis, stress fracture, etc.  -Wrapped foot and ankle bilateral in ace with slight compression. He was directed to elevate the LE.  -Possible he is resting the posterior aspect of his heels on the end of the bed or the recliner and beginning to develop a decubitus ulcer/ ecchymosis however this is not visualized on exam. Recommended floating the posterior heels off pillow while lying in bed or recliner.   -Podiatry will follow up with him in the morning after MRI.   -Appreciate the consult call with questions or concerns    David Coello DPM PGY-3  Pager [phone]    -

## 2017-04-06 VITALS
WEIGHT: 239.06 LBS | DIASTOLIC BLOOD PRESSURE: 79 MMHG | BODY MASS INDEX: 35.41 KG/M2 | TEMPERATURE: 98 F | OXYGEN SATURATION: 95 % | HEIGHT: 69 IN | RESPIRATION RATE: 18 BRPM | SYSTOLIC BLOOD PRESSURE: 138 MMHG | HEART RATE: 90 BPM

## 2017-04-06 LAB
ALBUMIN SERPL BCP-MCNC: 3.2 G/DL
ALP SERPL-CCNC: 173 U/L
ALT SERPL W/O P-5'-P-CCNC: 8 U/L
ANION GAP SERPL CALC-SCNC: 10 MMOL/L
ANION GAP SERPL CALC-SCNC: 6 MMOL/L
AST SERPL-CCNC: 19 U/L
BASOPHILS # BLD AUTO: 0.04 K/UL
BASOPHILS NFR BLD: 0.5 %
BILIRUB SERPL-MCNC: 1.3 MG/DL
BUN SERPL-MCNC: 31 MG/DL
BUN SERPL-MCNC: 34 MG/DL
CALCIUM SERPL-MCNC: 9.5 MG/DL
CALCIUM SERPL-MCNC: 9.7 MG/DL
CHLORIDE SERPL-SCNC: 95 MMOL/L
CHLORIDE SERPL-SCNC: 97 MMOL/L
CO2 SERPL-SCNC: 30 MMOL/L
CO2 SERPL-SCNC: 37 MMOL/L
CREAT SERPL-MCNC: 1.4 MG/DL
CREAT SERPL-MCNC: 1.5 MG/DL
DIFFERENTIAL METHOD: ABNORMAL
EOSINOPHIL # BLD AUTO: 0.3 K/UL
EOSINOPHIL NFR BLD: 4.5 %
ERYTHROCYTE [DISTWIDTH] IN BLOOD BY AUTOMATED COUNT: 13.4 %
EST. GFR  (AFRICAN AMERICAN): >60 ML/MIN/1.73 M^2
EST. GFR  (AFRICAN AMERICAN): >60 ML/MIN/1.73 M^2
EST. GFR  (NON AFRICAN AMERICAN): 53.5 ML/MIN/1.73 M^2
EST. GFR  (NON AFRICAN AMERICAN): 58.2 ML/MIN/1.73 M^2
GLUCOSE SERPL-MCNC: 103 MG/DL
GLUCOSE SERPL-MCNC: 124 MG/DL
HCT VFR BLD AUTO: 33.4 %
HGB BLD-MCNC: 11 G/DL
INR PPP: 1.5
LYMPHOCYTES # BLD AUTO: 1.5 K/UL
LYMPHOCYTES NFR BLD: 19.6 %
MAGNESIUM SERPL-MCNC: 2.1 MG/DL
MCH RBC QN AUTO: 28.2 PG
MCHC RBC AUTO-ENTMCNC: 32.9 %
MCV RBC AUTO: 86 FL
MONOCYTES # BLD AUTO: 1.5 K/UL
MONOCYTES NFR BLD: 20.5 %
NEUTROPHILS # BLD AUTO: 4 K/UL
NEUTROPHILS NFR BLD: 54.6 %
PLATELET # BLD AUTO: 250 K/UL
PMV BLD AUTO: 9.7 FL
POTASSIUM SERPL-SCNC: 3.6 MMOL/L
POTASSIUM SERPL-SCNC: 3.8 MMOL/L
PROT SERPL-MCNC: 8.1 G/DL
PROTHROMBIN TIME: 15 SEC
RBC # BLD AUTO: 3.9 M/UL
SODIUM SERPL-SCNC: 137 MMOL/L
SODIUM SERPL-SCNC: 138 MMOL/L
WBC # BLD AUTO: 7.38 K/UL

## 2017-04-06 PROCEDURE — 25000003 PHARM REV CODE 250: Performed by: INTERNAL MEDICINE

## 2017-04-06 PROCEDURE — 99233 SBSQ HOSP IP/OBS HIGH 50: CPT | Mod: ,,, | Performed by: PODIATRIST

## 2017-04-06 PROCEDURE — 85025 COMPLETE CBC W/AUTO DIFF WBC: CPT

## 2017-04-06 PROCEDURE — 25000003 PHARM REV CODE 250: Performed by: HOSPITALIST

## 2017-04-06 PROCEDURE — 83735 ASSAY OF MAGNESIUM: CPT

## 2017-04-06 PROCEDURE — 36415 COLL VENOUS BLD VENIPUNCTURE: CPT

## 2017-04-06 PROCEDURE — G0378 HOSPITAL OBSERVATION PER HR: HCPCS

## 2017-04-06 PROCEDURE — 80048 BASIC METABOLIC PNL TOTAL CA: CPT

## 2017-04-06 PROCEDURE — 80053 COMPREHEN METABOLIC PANEL: CPT

## 2017-04-06 PROCEDURE — 99239 HOSP IP/OBS DSCHRG MGMT >30: CPT | Mod: ,,, | Performed by: HOSPITALIST

## 2017-04-06 PROCEDURE — 85610 PROTHROMBIN TIME: CPT

## 2017-04-06 RX ORDER — HYDRALAZINE HYDROCHLORIDE 25 MG/1
25 TABLET, FILM COATED ORAL EVERY 8 HOURS
Qty: 90 TABLET | Refills: 11 | Status: ON HOLD | OUTPATIENT
Start: 2017-04-06 | End: 2017-10-27 | Stop reason: HOSPADM

## 2017-04-06 RX ORDER — ISOSORBIDE DINITRATE 20 MG/1
20 TABLET ORAL 3 TIMES DAILY
Qty: 90 TABLET | Refills: 11 | Status: ON HOLD | OUTPATIENT
Start: 2017-04-06 | End: 2019-04-16

## 2017-04-06 RX ORDER — LOSARTAN POTASSIUM 25 MG/1
12.5 TABLET ORAL DAILY
Qty: 15 TABLET | Refills: 2 | Status: SHIPPED | OUTPATIENT
Start: 2017-04-06 | End: 2017-04-06

## 2017-04-06 RX ORDER — GABAPENTIN 400 MG/1
400 CAPSULE ORAL 2 TIMES DAILY
Qty: 60 CAPSULE | Refills: 2 | Status: SHIPPED | OUTPATIENT
Start: 2017-04-06 | End: 2017-04-06

## 2017-04-06 RX ORDER — GABAPENTIN 400 MG/1
400 CAPSULE ORAL 2 TIMES DAILY
Qty: 60 CAPSULE | Refills: 2 | Status: ON HOLD | OUTPATIENT
Start: 2017-04-06 | End: 2019-04-16

## 2017-04-06 RX ORDER — WARFARIN 7.5 MG/1
12.5 TABLET ORAL DAILY
Qty: 45 TABLET | Refills: 0 | Status: ON HOLD | OUTPATIENT
Start: 2017-04-06 | End: 2019-04-17

## 2017-04-06 RX ORDER — WARFARIN 7.5 MG/1
12.5 TABLET ORAL DAILY
Qty: 45 TABLET | Refills: 0 | Status: SHIPPED | OUTPATIENT
Start: 2017-04-06 | End: 2017-04-06

## 2017-04-06 RX ORDER — TORSEMIDE 20 MG/1
40 TABLET ORAL 2 TIMES DAILY
Qty: 120 TABLET | Refills: 2 | Status: ON HOLD | OUTPATIENT
Start: 2017-04-06 | End: 2017-10-27

## 2017-04-06 RX ORDER — METHYLPREDNISOLONE 4 MG/1
TABLET ORAL
Qty: 1 PACKAGE | Refills: 0 | Status: SHIPPED | OUTPATIENT
Start: 2017-04-06 | End: 2017-04-06

## 2017-04-06 RX ORDER — TORSEMIDE 20 MG/1
40 TABLET ORAL 2 TIMES DAILY
Qty: 120 TABLET | Refills: 2 | Status: SHIPPED | OUTPATIENT
Start: 2017-04-06 | End: 2017-04-06

## 2017-04-06 RX ORDER — TORSEMIDE 10 MG/1
40 TABLET ORAL 2 TIMES DAILY
Status: DISCONTINUED | OUTPATIENT
Start: 2017-04-06 | End: 2017-04-06 | Stop reason: HOSPADM

## 2017-04-06 RX ORDER — LOSARTAN POTASSIUM 25 MG/1
12.5 TABLET ORAL DAILY
Qty: 15 TABLET | Refills: 2 | Status: ON HOLD | OUTPATIENT
Start: 2017-04-06 | End: 2019-04-16

## 2017-04-06 RX ORDER — METHYLPREDNISOLONE 4 MG/1
TABLET ORAL
Qty: 1 PACKAGE | Refills: 0 | Status: SHIPPED | OUTPATIENT
Start: 2017-04-06 | End: 2017-04-27

## 2017-04-06 RX ADMIN — GABAPENTIN 400 MG: 400 CAPSULE ORAL at 11:04

## 2017-04-06 RX ADMIN — OXYCODONE HYDROCHLORIDE 5 MG: 5 TABLET ORAL at 10:04

## 2017-04-06 RX ADMIN — ASPIRIN 81 MG CHEWABLE TABLET 81 MG: 81 TABLET CHEWABLE at 11:04

## 2017-04-06 RX ADMIN — ISOSORBIDE DINITRATE 20 MG: 10 TABLET ORAL at 06:04

## 2017-04-06 RX ADMIN — HYDRALAZINE HYDROCHLORIDE 25 MG: 25 TABLET, FILM COATED ORAL at 06:04

## 2017-04-06 RX ADMIN — HYDRALAZINE HYDROCHLORIDE 25 MG: 25 TABLET, FILM COATED ORAL at 03:04

## 2017-04-06 RX ADMIN — Medication 3 ML: at 06:04

## 2017-04-06 RX ADMIN — Medication 3 ML: at 03:04

## 2017-04-06 RX ADMIN — ISOSORBIDE DINITRATE 20 MG: 10 TABLET ORAL at 03:04

## 2017-04-06 RX ADMIN — OXYCODONE HYDROCHLORIDE 5 MG: 5 TABLET ORAL at 01:04

## 2017-04-06 RX ADMIN — CARVEDILOL 12.5 MG: 12.5 TABLET, FILM COATED ORAL at 11:04

## 2017-04-06 RX ADMIN — LOSARTAN POTASSIUM 12.5 MG: 25 TABLET, FILM COATED ORAL at 11:04

## 2017-04-06 RX ADMIN — TORSEMIDE 40 MG: 10 TABLET ORAL at 11:04

## 2017-04-06 RX ADMIN — POTASSIUM BICARBONATE 50 MEQ: 25 TABLET, EFFERVESCENT ORAL at 11:04

## 2017-04-06 NOTE — PROGRESS NOTES
Spoke with physician dr. lu and he was informed patient now using [facility] [address] . Physician stated he will change that shortly . Assessment on going. Awaiting correction. Patient aware. Sitting up in chair watching tv.

## 2017-04-06 NOTE — PROGRESS NOTES
Patient arrived back to unit from MRI and was informed that he was assigned a room on the 9th floor. Patient refused to get off the stretcher and requested to just be moved to his new room. Receiving nurse on 9th floor notified. PRN pain medication administered for complaint of 10/10 foot pain. Patient left unit via stretcher escorted by transport personnel. Centralized telemetry in place.

## 2017-04-06 NOTE — DISCHARGE SUMMARY
[facility] Hospital Medicine  Discharge Summary      Patient Name: Hamlet Terrell  MRN: [id number]  Admission Date: 4/1/2017  Hospital Length of Stay: 2 days  Discharge Date and Time: 4/6/17  1600  Attending Physician: José Luis Langford MD   Discharging Provider: José Luis Langford MD  Primary Care Provider: Carlin Swift MD  [facility] Medicine Team: [facility]OS[facility] MED C José Luis Langford MD    HPI:   The patient reports blisters in his legs starting on Wednesday. He endorses LE swelling and MUÑOZ. The patient denies bloody stools,fever, hemoptysis, salty food intake, and medical non-adherence. He denies palpitations and CP. The patient denies EtOH. He says he takes 40 mg BID of torsemide though he says his doctor wants him to take 40 mg TID. The patient is concerned about running out of the torsemide with three times a day dosing.     * No surgery found *      Indwelling Lines/Drains at time of discharge:   Lines/Drains/Airways          No matching active lines, drains, or airways        Hospital Course:   Acute on chronic systolic CHF (congestive heart failure)  Elevated troponin  LFTs abnormal  -Patient admitted for management of acute on chronic heart failure exacerbation in the setting of medication non-adherence and possible MR.  He had troponin elevation on admission but no anginal symptoms and troponin from 0.094 to 0.074 after admission.   He was started on IV lasix 80mg BID while admitted for diuresis and patient had improvement in symptoms through this stay.   Patient with elevated bilirubin that downtrending during admission thought to be secondary to heart failure.     Patient was started on losartan prior to discharge. Also remains on beta blocker, hydralazine and nitrates        Atrial fibrillation, chronic  Subtherapeutic international normalized ratio (INR)  -Patient presented subtherapeutic on INR and initially on warfarin 10mg titrated to 12mg and pt will continue home dose of 12.5mg on  discharge.  His warfarin is managed by his PCP Dr. Swift.  Patient instructed to call and make an appointment for 1 week f/u      LE Edema & Pain  -Patient has had chronic LE heel/ankle pain that initially was thought secondary to LE swelling, and possibly gout but as swelling decreased and no improvement with colchicine.  Pt initially receiving opiates, but this was titrated down and patient was started on Gabapentin, at 400mg BID for renal dosing on discharge.   -Podiatry was consulted and an MRI revealed no structural abnormality, there was concern for possible left heel bursitis and a medrol dosepak ordered for discharge.  However pain appears out of proportion to exam and findings.  IN December patient had w/u to rule out arterial insufficiency and DVT was ruled out this admission.   He will receive a walking boot from podiatry  -would not recommend any chronic opiate treatment for this pain.            Consults:   Consults         Status Ordering Provider     Inpatient consult to Cardiology  Once     Provider:  (Not yet assigned)    Completed BACILIO FOFANA     Inpatient consult to Podiatry  Once     Provider:  (Not yet assigned)    Completed NAILA MENDEZ          Significant Diagnostic Studies: Cardiac Graphics: Echocardiogram:   2D echo with color flow doppler:   Results for orders placed or performed during the hospital encounter of 04/01/17   2D echo with color flow doppler   Result Value Ref Range    EF 25 (A) 55 - 65    Mitral Valve Regurgitation MILD     Diastolic Dysfunction Yes (A)     Est. PA Systolic Pressure 45 (A)     Tricuspid Valve Regurgitation MILD        Pending Diagnostic Studies:     Procedure Component Value Units Date/Time    Basic metabolic panel [[id number]]     Order Status:  Sent Lab Status:  No result     Specimen:  Blood from Blood         Final Active Diagnoses:    Diagnosis Date Noted POA    PRINCIPAL PROBLEM:  Acute on chronic systolic CHF (congestive heart failure)  [I50.23] 05/09/2015 Yes    Atrial fibrillation, chronic [I48.2] 01/02/2013 Yes     Chronic    Heel pain, bilateral [M79.671, M79.672] 04/05/2017 Yes     Chronic    Essential hypertension [I10] 01/02/2013 Yes     Chronic    Subtherapeutic international normalized ratio (INR) [R79.1] 12/27/2015 Yes    Coronary artery disease [I25.10] 05/31/2016 Yes     Chronic    Chronic kidney disease [N18.9] 05/31/2016 Yes     Chronic    Normocytic anemia [D64.9] 12/10/2016 Yes    Hypokalemia [E87.6] 04/03/2017 Yes    Leg swelling [M79.89] 04/01/2017 Yes      Problems Resolved During this Admission:    Diagnosis Date Noted Date Resolved POA    Elevated troponin [R74.8] 01/11/2015 04/06/2017 Yes    LFTs abnormal [R79.89] 12/10/2016 04/06/2017 Yes      No new Assessment & Plan notes have been filed under this hospital service since the last note was generated.  Service: Hospital Medicine      Discharged Condition: fair    Disposition: Home or Self Care    Follow Up:  Follow-up Information     Follow up with [facility].    Specialty:  Emergency Medicine    Why:  If symptoms worsen    Contact information:    [address]  [facility] [address] [phone]        Schedule an appointment as soon as possible for a visit with Carlin Swift MD.    Specialty:  Family Medicine    Contact information:    [address] [facility] [id number] [phone]          Schedule an appointment as soon as possible for a visit with Ajay Ambriz - Cardiology.    Specialty:  Cardiology    Contact information:    [address]  [facility] [address] [phone]    Additional information:    3rd floor        Schedule an appointment as soon as possible for a visit with Ajay Ambriz - Wound Care.    Specialty:  Wound Care    Contact information:    [address]  [facility] [address] [phone]    Additional information:    8th Floor        Follow up with Carlin  Eduard Swift MD In 1 week.    Specialty:  Family Medicine    Why:  Please see your Primary care in 1 week for f/u and check of your INR and hospital follow up    Contact information:    [address]  [facility] [id number] [phone]          Patient Instructions:     Ambulatory Referral to Transplant, Heart   Referral Priority: Routine Referral Type: Transplants   Referral Reason: Specialty Services Required    Number of Visits Requested: 1      Ambulatory consult to Family Practice   Referral Priority: Routine Referral Type: Consultation   Referral Reason: Specialty Services Required    Requested Specialty: Family Medicine    Number of Visits Requested: 1      Ambulatory Referral to Podiatry   Referral Priority: Routine Referral Type: Consultation   Referral Reason: Specialty Services Required    Requested Specialty: Podiatry    Number of Visits Requested: 1      Ambulatory Referral to Cardiology   Referral Priority: Routine Referral Type: Consultation   Referral Reason: Specialty Services Required    Requested Specialty: Cardiology    Number of Visits Requested: 1      Ambulatory Referral to Transplant, Heart   Referral Priority: Routine Referral Type: Transplants   Number of Visits Requested: 1      Diet general     Call MD for:  temperature >100.4     Call MD for:  persistent nausea and vomiting or diarrhea     Call MD for:  severe uncontrolled pain     Call MD for:  redness, tenderness, or signs of infection (pain, swelling, redness, odor or green/yellow discharge around incision site)     Call MD for:  difficulty breathing or increased cough     Call MD for:  severe persistent headache     Call MD for:  worsening rash     Call MD for:  persistent dizziness, light-headedness, or visual disturbances     Call MD for:  increased confusion or weakness       Medications:  Reconciled Home Medications:   Current Discharge Medication List      START taking these medications    Details   gabapentin  (NEURONTIN) 400 MG capsule Take 1 capsule (400 mg total) by mouth 2 (two) times daily. For chronic heel/foot pain  Qty: 60 capsule, Refills: 2      losartan (COZAAR) 25 MG tablet Take 0.5 tablets (12.5 mg total) by mouth once daily.  Qty: 15 tablet, Refills: 2      methylPREDNISolone (MEDROL DOSEPACK) 4 mg tablet use as directed  Qty: 1 Package, Refills: 0         CONTINUE these medications which have CHANGED    Details   hydrALAZINE (APRESOLINE) 25 MG tablet Take 1 tablet (25 mg total) by mouth every 8 (eight) hours.  Qty: 90 tablet, Refills: 11      isosorbide dinitrate (ISORDIL) 20 MG tablet Take 1 tablet (20 mg total) by mouth 3 (three) times daily.  Qty: 90 tablet, Refills: 11      torsemide (DEMADEX) 20 MG Tab Take 2 tablets (40 mg total) by mouth 2 (two) times daily.  Qty: 120 tablet, Refills: 2      warfarin (COUMADIN) 7.5 MG tablet Take 1.5 tablets (11.25 mg total) by mouth Daily.  Qty: 45 tablet, Refills: 0         CONTINUE these medications which have NOT CHANGED    Details   aspirin 81 MG Chew Take 1 tablet (81 mg total) by mouth once daily.  Refills: 0      carvedilol (COREG) 25 MG tablet Take 0.5 tablets (12.5 mg total) by mouth 2 (two) times daily.  Qty: 60 tablet, Refills: 1      nitroGLYCERIN (NITROSTAT) 0.4 MG SL tablet Place 1 tablet (0.4 mg total) under the tongue every 5 (five) minutes as needed for Chest pain. Tablet, Sublingual Sublingual  Qty: 30 tablet, Refills: 11      potassium chloride SA (K-DUR,KLOR-CON) 20 MEQ tablet Take by mouth once daily. Patient reports taking 25 mg daily         STOP taking these medications       senna-docusate 8.6-50 mg (PERICOLACE) 8.6-50 mg per tablet Comments:   Reason for Stopping:             Time spent on the discharge of patient: 45 minutes    José Luis Langford MD  Department of Hospital Medicine  [facility]

## 2017-04-06 NOTE — PROGRESS NOTES
Progress Note  [facility] Medicine    Primary Team: [facility]OSP MED C  Admit Date: 4/1/2017   Length of Stay:  LOS: 1 day   SUBJECTIVE:   Reason for Admission:  Acute on chronic systolic CHF (congestive heart failure)    HPI:  The patient reports blisters in his legs starting on Wednesday. He endorses LE swelling and MUÑOZ. The patient denies bloody stools,fever, hemoptysis, salty food intake, and medical non-adherence. He denies palpitations and CP. The patient denies EtOH. He says he takes 40 mg BID of torsemide though he says his doctor wants him to take 40 mg TID. The patient is concerned about running out of the torsemide with three times a day dosing.     Interval history:    Overnight, patient w/o acute events, has refused some therapies, and is non-participatory with nursing.     Today he reports ongoing bilateral heel/LE pain, no dyspnea/chest pain.      Review of Systems:  Constitutional: no fever or chills  Respiratory: no cough or shortness of breath  Cardiovascular: no chest pain or palpitations  Gastrointestinal: no nausea or vomiting, no abdominal pain or change in bowel habits  Musculoskeletal: no arthralgias or myalgias     OBJECTIVE:     Temp:  [98.1 °F (36.7 °C)-99.4 °F (37.4 °C)]   Pulse:  [62-92]   Resp:  [17-18]   BP: (108-128)/(53-64)   SpO2:  [95 %-98 %]  Body mass index is 35.31 kg/(m^2).  Intake/[place]ake:  This Shift:       Net I/O past 24h:     Intake/Output Summary (Last 24 hours) at 04/05/17 2059  Last data filed at 04/05/17 1000   Gross per 24 hour   Intake                0 ml   Output              700 ml   Net             -700 ml             Physical Exam:  Gen- well-developed,  CVS- S1 and S2 present, irregular rhythm, murmur present  Resp- CTA b/l, no increased work of breathing  Abd- BS+, soft, NT, ND  Ext- 2+ edema (less tight) of b/l LE.  Bilateral heel pain, ankles with taut edema, patient with excoriations over the anterior surface of shins, bilateral tenderness to palpation in plantar  heels and posterior aspect of heels    Laboratory:  CBC/Anemia Labs: Coags:      Recent Labs  Lab 04/03/17 0452 04/04/17 0514 04/05/17 0512   WBC 6.92 7.85 7.79   HGB 11.0* 11.0* 11.6*   HCT 33.7* 33.3* 35.3*    225 235   MCV 86 85 86   RDW 13.3 13.5 13.4      Recent Labs  Lab 04/03/17 0451 04/04/17 0514 04/05/17 0512   INR 1.7* 1.9* 1.6*        Chemistries:     Recent Labs  Lab 04/03/17 0452 04/04/17 0514 04/04/17  2133 04/05/17 0512 04/05/17  1547    137 138 139 140   K 3.4* 3.4* 3.6 3.4* 4.0   CL 98 97 96 97 95   CO2 32* 31* 32* 31* 34*   BUN 20 28* 30* 32* 32*   CREATININE 1.5* 1.6* 1.6* 1.6* 1.6*   CALCIUM 9.2 9.3 9.8 9.8 9.7   PROT 7.7 7.9  --  8.7*  --    BILITOT 1.2* 1.6*  --  1.4*  --    ALKPHOS 132 138*  --  158*  --    ALT 8* 6*  --  7*  --    AST 15 16  --  16  --    MG 1.7 1.6  --  2.2  --           Cardiac Enzymes: Ejection Fractions:    No results for input(s): CPK, CPKMB, MB, TROPONINI in the last 72 hours. EF   Date Value Ref Range Status   04/02/2017 25 (A) 55 - 65    06/01/2016 35 (A) 55 - 65         POCT Glucose: HbA1c:    No results for input(s): POCTGLUCOSE in the last 168 hours. Hemoglobin A1C   Date Value Ref Range Status   10/17/2015 6.4 (H) 4.5 - 6.2 % Final   02/11/2007 6.3 (H) 4.5 - 6.2 % Final         Medications:  Scheduled Meds:   aspirin  81 mg Oral Daily    carvedilol  12.5 mg Oral BID    furosemide  80 mg Intravenous BID    gabapentin  400 mg Oral BID    hydrALAZINE  25 mg Oral Q8H    isosorbide dinitrate  20 mg Oral TID    oxymetazoline  2 spray Each Nare BID    polyethylene glycol  17 g Oral Daily    senna-docusate 8.6-50 mg  1 tablet Oral BID    sodium chloride 0.9%  3 mL Intravenous Q8H    warfarin  12 mg Oral Daily                             Continuous Infusions:   PRN Meds:.nitroGLYCERIN, oxycodone     - ECHO 4/2/17:    1 - Eccentric hypertrophy.     2 - Severely depressed left ventricular systolic function (EF 25-30%).     3 - Left  ventricular diastolic dysfunction.     4 - Biatrial enlargement.     5 - Right ventricular enlargement with mildly to moderately depressed systolic function.     6 - Pulmonary hypertension. The estimated PA systolic pressure is 45 mmHg.     7 - Mild mitral regurgitation.     8 - Mild tricuspid regurgitation.     9 - Increased central venous pressure.      ASSESSMENT/PLAN:     Acute on chronic systolic CHF (congestive heart failure)  Elevated troponin  LFTs abnormal  - ADHF in the setting of medical non-adherence, possibly worsening MR  - Tn elevation in the setting of CKD and CMP/HF; no anginal CP currently and troponin levels have peaked.- LFT abnormality in the setting of CMP/HF, improving with diuresis  - Continue Lasix 80mg IV BID; good urine output  - monitor LE edema on legs with diuresis  - allergy to ACEi, will start low dose Losartan tomorrow.  - continue GDMT otherwise with beta-blocker, hydralazine-nitrate       Atrial fibrillation, chronic  Subtherapeutic international normalized ratio (INR)  - follows with outside coumadin clinic  - continue Warfarin, increase dose to 12mg today as INR remains subtherapeutic  - continue carvedilol    LE Edema & Pain  -Likely 2/2 CHF exacerbation as above  -with continued pain, will check LE dopplers (negative)  -no improvement with colchicine   -Titrate Gabapentin to 400mg BID  -Presence of bilateral LE pain noted in prior d/c summaries in association with CHF exacerbation, has been ruled out for DVT has prior arterial ultrasound negative for arterial insufficiency.   -Consulted podiatry today, they recommend & have ordered MRI will f/u results     Chronic kidney disease  - IV Lasix as above for hypervolemia       Coronary artery disease  - aspirin, carvedilol  - no anginal CP currently     Hypertension  - carvedilol, hydral/ISDN; midday dose held     Normocytic anemia  - no overt s/s of GIB  - continue to monitor    Hypokalemia  -Repleted (pt prefers K-lyte)    DVT  ppx- Warfarin  CODE Status- FULL        José Luis Langford M.D.  Attending Physician  [facility] Medicine Dept.  Pager: [phone]

## 2017-04-06 NOTE — PROGRESS NOTES
Transporter came to take patient to X ray patient. Per transporter, patient acting like he can't hear.

## 2017-04-06 NOTE — PLAN OF CARE
Problem: Patient Care Overview  Goal: Plan of Care Review  Outcome: Outcome(s) achieved Date Met:  04/06/17  Pain controlled with medication. No falls or trauma noted. On telemetry and afib noted. No cough noted. Tolerated medication well. Skin intact except dfor wounds to bilateral feet. On fluid restriction. Assessment on going.

## 2017-04-06 NOTE — PROGRESS NOTES
Progress Note  Podiatry    SUBJECTIVE     History of Present Illness:  Hamlet Terrell is a 51 y.o. male who  has a past medical history of Anticoagulant long-term use; CHF (congestive heart failure); Chronic combined systolic and diastolic congestive heart failure; Coronary artery disease; CVA (cerebrovascular accident); Heart attack; Hypertension; Hyperthyroidism, subclinical; Paroxysmal atrial fibrillation; S/P ablation of atrial flutter; and Stroke.     Patient states that he has severe 10/10 pain to the bilateral posterior heels L>R. The pain has been presents since about Saturday and he is unable to weight bear on them since, therefor unable to participate with PT. He is inpatient with CHF and severe LE edema, he notes that the pain has been present ever since the edema started to resolve. He denies trauma. The pain is exacerbated with standing and walking, but there is pain even when sitting in the chair.      Interval History  Patient was seen in recliner next to bed. Relates the pain in the heels is similar to yesterday. No new pedal complaints.     Scheduled Meds:   aspirin  81 mg Oral Daily    carvedilol  12.5 mg Oral BID    gabapentin  400 mg Oral BID    hydrALAZINE  25 mg Oral Q8H    isosorbide dinitrate  20 mg Oral TID    losartan  12.5 mg Oral Daily    oxymetazoline  2 spray Each Nare BID    polyethylene glycol  17 g Oral Daily    senna-docusate 8.6-50 mg  1 tablet Oral BID    sodium chloride 0.9%  3 mL Intravenous Q8H    torsemide  40 mg Oral BID    warfarin  12 mg Oral Daily     Continuous Infusions:   PRN Meds:nitroGLYCERIN, oxycodone    Review of patient's allergies indicates:   Allergen Reactions    Acetaminophen      Itching    Oxycodone-acetaminophen      Other reaction(s): Itching    Ace inhibitors Other (See Comments)     cough        Past Medical History:   Diagnosis Date    Anticoagulant long-term use     CHF (congestive heart failure)     Chronic combined systolic and  diastolic congestive heart failure     Coronary artery disease     CVA (cerebrovascular accident)     Heart attack 2006    Hypertension     Hyperthyroidism, subclinical 1/2/2013    Paroxysmal atrial fibrillation     S/P ablation of atrial flutter 2008    Stroke 2009    no residual weaknesses     Past Surgical History:   Procedure Laterality Date    RADIOFREQUENCY ABLATION  01/08/2008    for atrial flutter     Family History   Problem Relation Age of Onset    Hypertension Mother     Stroke Mother     Hypertension Father     Alcohol abuse Father     Hypertension Sister     Hypertension Brother      Social History   Substance Use Topics    Smoking status: Never Smoker    Smokeless tobacco: None    Alcohol use No       Review of Systems:  Constitutional: no fever or chills  Respiratory: no cough or shortness of breath  Cardiovascular: no chest pain or palpitations  Gastrointestinal: no nausea or vomiting, tolerating diet  Musculoskeletal: no arthralgias or myalgias  Neurological: no history of numbness or paresthesias in the feet    OBJECTIVE     Vital Signs (Most Recent):  Temp: 98.1 °F (36.7 °C) (04/06/17 1056)  Pulse: 90 (04/06/17 1100)  Resp: 18 (04/06/17 1056)  BP: 138/79 (04/06/17 1056)  SpO2: 95 % (04/06/17 1056)    Vital Signs Range (Last 24H):  Temp:  [97.5 °F (36.4 °C)-98.7 °F (37.1 °C)]   Pulse:  [62-92]   Resp:  [17-18]   BP: (101-138)/(56-79)   SpO2:  [94 %-98 %]     Physical Exam:  General: Oriented to person, place, time, and situation. No acute distress.  Vascular: DP and PT pulses are 1+ bilaterally. CRT<3 seconds to digits 1-5 bilaterally. Moderate LE edema bilateral feet and ankles  Musculoskeletal: Equinus noted b/l ankles with 5 deg DF noted with guarding secondary to pain. Severe pain with palpation to the posterior heel along the skin over the distal achilles tendon.  Neuro: Light touch, proprioception, and sharp/dull sensation are all intact bilaterally.    Derm: No open  lesions, lacerations or wounds noted. Skin is warm to touch proximal with normal distal cooling.     Laboratory:  CBC:     Recent Labs  Lab 04/06/17  0548   WBC 7.38   RBC 3.90*   HGB 11.0*   HCT 33.4*      MCV 86   MCH 28.2   MCHC 32.9     CMP:     Recent Labs  Lab 04/06/17  0548   *   CALCIUM 9.5   ALBUMIN 3.2*   PROT 8.1      K 3.6   CO2 30*   CL 97   BUN 34*   CREATININE 1.5*   ALKPHOS 173*   ALT 8*   AST 19   BILITOT 1.3*     ESR: No results for input(s): SEDRATE in the last 168 hours.  CRP: No results for input(s): CRP in the last 168 hours.    Diagnostic Results:  MRI: reviewed  1. Suboptimal exam secondary to patient motion and noncompliance without evidence of gross internal derangement of the ankle. Minimal retrocalcaneal bursitis.      ASSESSMENT/PLAN     Assessment:  -Bilateral posterior heel pain   -CHF  -Severe LE edema, edema possible etiology of pain    Plan:  -Pain is 10/10 out of proportion to exam, he barely allows me to touch the posterior heels. MRI only shows bursitis which is consistent with the area that he is in pain, although the pain is out of proportion. Discussed with primary team who related a history of drug seeking and becoming combative when not receiving his pain medicine.  -Wrapped foot and ankle bilateral in ace with slight compression. He was directed to elevate the LE.  -Nursing orders to ice bilateral posterior heels BID for 15 minutes  -Medrol dose pack for inflammation from the bursitis is recommended.  -Possible orthopedic boot to the right foot to help with ambulation   -Appreciate the consult call with questions or concerns podiatry will sign off    David Coello DPM PGY-3  Pager [phone]

## 2017-04-06 NOTE — PROGRESS NOTES
Reviewed all discharge instruction again. Verbalized understanding. Assessment on going. Awaiting wheelchair for discharge. Belongings packed.

## 2017-04-06 NOTE — PROGRESS NOTES
"Pt sitting in chair watching TV. Refused to take Senna-docusate. Explained why he needed the med and pt still refused. Asked pt if he was agreeable to go for his MRI. Pt said yes, the transporter should have "shook him" to wake him up. Explained some pts are startled andwake up swinging when they are shaken awake. Pt states he does not do that. Call placed to MRI and Fatemeh stated they are busy now and cannot take him. Do not have transporter. Instructed Fatemeh he could wait until AM.  "

## 2017-04-06 NOTE — DISCHARGE INSTRUCTIONS
1. For you acute on chronic heart failure exacerbation you will be discharged with a new dose of torsemide 40mg by mouth twice a day    2. For your heel pain, you were seen by the podiatry doctors, you had an MRI that did not reveal structural damage or abnormality.  You will receive a new prescription Gabapentin for chronic foot/heel pain.  Podiatry to provide left walking boot.   Also they recommended a Medrol Dosepack to treat a left heel bursitis                Leg Swelling in Both Legs    Swelling of the feet, ankles, and legs is called edema. It is caused by excess fluid that has collected in the tissues. Extra fluid in the body settles in the lowest part because of gravity. This is why the legs and feet are most affected.  Some of the causes for edema include:  · Disease of the heart like congestive heart failure  · Standing or sitting for long periods of time  · Infection of the feet or legs  · Blood pooling in the veins of your legs (venous insufficiency)  · Dilated veins in your lower leg (varicose veins)  · Garters or other clothing that is tight on your legs. This will cause blood to pool in your legs because the clothing limits blood flow.  · Some medicines such as hormones like birth control pills, some blood pressure medicines like calcium channel blockers (amlodipine) and steroids, some antidepressants like MAO inhibitors and tricyclics  · Menstrual periods that cause you to retain fluids  · Many types of renal disease  · Liver failure or cirrhosis  · Pregnancy, some swelling is normal, but a sudden increase in leg swelling or weight gain can be a sign of a dangerous complication of pregnancy  · Poor nutrition  · Thyroid disease  Medical treatment will depend on what is causing the swelling in your legs. Your healthcare provider may prescribe water pills (diuretics) to get rid of the extra fluid.  Home care  Follow these guidelines when caring for yourself at home:  · Don't wear clothing like garters  that is tight on your legs.  · Keep your legs up while lying or sitting.  · If infection, injury, or recent surgery is causing the swelling, stay off your legs as much as possible until symptoms get better.  · If your healthcare provider says that your leg swelling is caused by venous insufficiency or varicose veins, don't sit or  one place for long periods of time. Take breaks and walk about every few hours. Brisk walking is a good exercise. It helps circulate the blood that has collected in your leg. Talk with your provider about using support stockings to stop daytime leg swelling.  · If your provider says that heart disease is causing your leg swelling, follow a low-salt diet to stop extra fluid from staying in your body. You may also need medicine.  Follow-up care  Follow up with your healthcare provider, or as advised.  When to seek medical advice  Call your healthcare provider right away if any of these occur:  · New shortness of breath or chest pain  · Shortness of breath or chest pain that gets worse  · Swelling in both legs or ankles that gets worse  · Swelling of the abdomen  · Redness, warmth, or swelling in one leg  · Fever of 100.4ºF (38ºC) or higher, or as directed by your healthcare provider  · Yellow color to your skin or eyes  · Rapid, unexplained weight gain  · Having to sleep upright or use an increased number of pillows  Date Last Reviewed: 3/31/2016  © 8939-9306 [employer]. [address] [place] [address]. All rights reserved. This information is not intended as a substitute for professional medical care. Always follow your healthcare professional's instructions.          Heart Failure: Warning Signs of a Flare-Up  You have a condition called heart failure. Once you have heart failure, flare-ups can happen. Below are signs that can mean your heart failure is getting worse. If you notice any of these warning signs, call your healthcare  provider.  Swelling    · Your feet, ankles, or lower legs get puffier.  · You notice skin changes on your lower legs.  · Your shoes feel too tight.  · Your clothes are tighter in the waist.  · You have trouble getting rings on or off your fingers.  Shortness of breath  · You have to breathe harder even when youre doing your normal activities or when youre resting.  · You are short of breath walking up stairs or even short distances.  · You wake up at night short of breath or coughing.  · You need to use more pillows or sit up to sleep.  · You wake up tired or restless.  Other warning signs  · You feel weaker, dizzy, or more tired.  · You have chest pain or changes in your heartbeat.  · You have a cough that wont go away.  · You cant remember things or dont feel like eating.  Tracking your weight  Gaining weight is often the first warning sign that heart failure is getting worse. Gaining even a few pounds can be a sign that your body is retaining excess water and salt. Weighing yourself each day in the morning after you urinate and before you eat, is the best way to know if you're retaining water. Get a scale that is easy to read and make sure you wear the same clothes and use the same scale every time you weigh. Your healthcare provider will show you how to track your weight. Call your doctor if you gain more than 2 pounds in 1 day, 5 pounds in 1 week, or whatever weight gain you were told to report by your doctor. This is often a sign of worsening heart failure and needs to be evaluated and treated before it compromises your breathing. Your doctor will tell you what to do next.    Date Last Reviewed: 3/15/2016  © 5152-4107 [employer]. [address] [place] [address], [place] [address]. All rights reserved. This information is not intended as a substitute for professional medical care. Always follow your healthcare professional's instructions.

## 2017-04-06 NOTE — PROGRESS NOTES
Report received from kulwinder in observation. Patient received at this time. Assisted from stretcher to chair. Stated he has foot pain. Dressings to bilateral feet clean dry and intact. Stated he had a pain pill prior to coming here.

## 2017-04-06 NOTE — PROGRESS NOTES
Discharge instructions in progress. Iv removed from right forearm. No free bleeding , edema or redness. Telemetry removed at this time. Denied chest pain or discomfort. Wheelchair sent for. Voided well . Assessment on going.

## 2017-04-06 NOTE — PLAN OF CARE
Problem: Patient Care Overview  Goal: Plan of Care Review  Outcome: Ongoing (interventions implemented as appropriate)  Rounding every 2 hours to reduce risk of falls. Monitor and record pts fluid intake to reduce risk of fluid volume excess. Administer medications per MD order for fluid reduction and cardiac problems.

## 2017-04-07 LAB — BACTERIA BLD CULT: NORMAL

## 2017-04-08 ENCOUNTER — PATIENT OUTREACH (OUTPATIENT)
Dept: ADMINISTRATIVE | Facility: CLINIC | Age: 52
End: 2017-04-08
Payer: MEDICAID

## 2017-04-08 NOTE — PATIENT INSTRUCTIONS
Discharge Instructions for Heart Failure  The heart is a muscle that pumps oxygen-rich blood to all parts of the body. When you have heart failure, the heart is not able to pump as well as it should. Blood and fluid may back up into the lungs (congestive heart failure), and some parts of the body dont get enough oxygen-rich blood to work normally. These problems lead to the symptoms of heart failure. Heart failure can occur due to an injury to the heart or from natural processes.  You can control symptoms of heart failure with some lifestyle changes and by following your doctor's advice.  Home care  Activity  Ask your healthcare provider about an exercise program. You can benefit from simple activities such as walking or gardening. Exercising most days of the week can make you feel better. Don't be discouraged if your progress is slow at first. Rest as needed. Stop activity if you develop symptoms such as chest pain, lightheadedness, or significant shortness of breath. Find activities that you enjoy, such as brisk walking, dancing, swimming, or gardening. These will help you stay active and strengthen your heart.  Diet  Follow a heart healthy diet. And make sure to limit the salt (sodium) in your diet. Salt causes your body to hold water. This makes your heart work harder as there is more fluid for the heart to pump. Limit your salt by doing the following:  · Limit canned, dried, packaged, and fast foods.  · Don't add salt to your food.  · Season foods with herbs instead of salt.  · Watch how much liquids you drink. Drinking too much can make heart failure worse. Talk with your health care provider about how much you should drink each day.  · Limit the amount of alcohol you drink. It may harm your heart. Women should have no more than 1 drink a day and men should have no more than 2.  · When you eat out, request that your meals have no added salt.  Tobacco  If you smoke, it's very important to quit. Smoking  increases your chances of having a heart attack by harming the blood vessels that provide oxygen to your heart. This makes heart failure worse. Quitting smoking is the number one thing you can do to improve your health. Enroll in a stop-smoking program to improve your chances of success. Talk with your healthcare provider about medicines or nicotine replacement therapy to help you quit smoking. Ask your healthcare provider about smoking cessation support groups.  Medicine  Take your medicines exactly as prescribed. Learn the names and purpose of each of your medicines. Keep an accurate medicine list and current dosages with you at all times. Don't skip doses. If you miss a dose of your medicine, take it as soon as you remember. If you miss a dose and it's almost time for your next dose, just wait and take your next dose at the normal time. Don't take a double dose. If you are unsure, call your doctor's office. Make sure not to mix up your medicines or forget what you've taken the same day.  Weight monitoring  Weigh yourself every day. A sudden weight gain can mean your heart failure is getting worse. Weigh yourself at the same time of day and in the same kind of clothes. Ideally, weigh yourself first thing in the morning after you empty your bladder, but before you eat breakfast. Your healthcare provider will show you how to track your weight. He or she will also discuss with you when you should call if you have a sudden, unexpected increase in your weight.  In general, your healthcare provider may ask you to report if your weight goes up by more than 2 pounds in 1 day,  5 pounds in 1 week, or whatever weight gain you were told by your doctor. This is a sign that you are retaining more fluid than you should be. Clues to weight gain include checking your ankles for swelling, or noticing you are short of breath when you lie down.  Follow-up care  Make a follow-up appointment as directed. Depending on the type and  severity of heart failure you have, you may need follow-up as early as 7 days from hospital discharge. Keep appointments for checkups and lab tests that are needed to check your medicines and condition.  Recognize that your health and even survival depend on your following medical recommendations.  Symptoms  Heart failure can cause a variety of symptoms, including:  · Shortness of breath  · Trouble breathing at night, especially when you lie down  · Swelling in the legs and feet or in the belly (abdomen)  · Becoming easily fatigued  · Irregular or rapid heartbeat  · Weakness or lightheadedness  · Swelling of the neck veins  It is important to know what to do if symptoms get worse or if you develop signs of worsening heart failure.     When to see your healthcare provider  Call your doctor right away if you have any of these signs of worsening heart failure:  · Sudden weight gain (more than 2 pounds in 1 day or 5 pounds in 1 week, or whatever weight gain you were told to report by your doctor)  · Trouble breathing not related to being active  · New or increased swelling of your legs or ankles  · Swelling or pain in your abdomen  · Breathing trouble at night (waking up short of breath, needing more pillows to breathe)  · Frequent coughing that doesn't go away  · Feeling much more tired than usual  Call 911  Call 911 right away if you have:  · Severe shortness of breath, such that you can't catch your breath even while resting  · Severe chest pain that does not resolve with rest or nitroglycerin  · Pink, foamy mucus with cough and shortness of breath  · A continuous rapid or irregular heartbeat  · Passing out or fainting  · Stroke symptoms such as sudden numbness or weakness on one side of your face, arm, or leg or sudden confusion, trouble speaking or vision changes   Date Last Reviewed: 3/21/2016  © 1361-9071 [employer]. [address] [place] [address], [place] [address]. All rights reserved. This information  is not intended as a substitute for professional medical care. Always follow your healthcare professional's instructions.        Discharge Instructions: Eating a Low-Salt Diet  Your health care provider has prescribed a low-salt diet for you. Most people with heart problems need to eat less salt, which is full of sodium. Too much sodium is linked to high blood pressure, which is linked to a greater risk of heart disease, stroke, blindness, and kidney problems.  Home care    Learn ways to cut back on salt (sodium):  · Eat less frozen, canned, dried, packaged, and fast foods. These often contain high amounts of sodium.  · Season foods with herbs instead of salt when you cook.  · Season with flavorings such as pepper, lemon, garlic, and onion.  · Dont add salt to your food at the table.  · Sprinkle salt-free herbal blends on meats and vegetables.  Learn to read food labels carefully:  · Look for the total amount of sodium per serving.  · Look for foods labeled low sodium, reduced sodium, or no added salt.  · Beware. Salt goes by many names. Cut down on foods with these words (all forms of salt) listed as ingredients:  ¨ Salt  ¨ Sodium  ¨ Soy sauce  ¨ Baking soda  ¨ Baking powder  ¨ MSG  ¨ Monosodium  ¨ Na (the chemical symbol for sodium)  Other ideas:  · Use more fresh food. Buy more fruits and vegetables.  · Select lean meats, fish, and poultry.  · Find a cookbook with low-salt recipes. Youll find ideas for tasty meals that are healthy for your heart.  ·   When eating out, ask questions about the menu. Tell the  you're on a low-salt diet.  ¨ If you order fish, chicken, beef, or pork, ask the  to have it broiled, baked, poached, or grilled without salt, butter, or breading.  ¨ Choose plain steamed rice, boiled noodles, and baked or boiled potatoes. Top potatoes with chives and a little sour cream instead of butter.  · Avoid antacids that are high in salt. Check the label before you buy.  Follow-up  Make a  follow-up appointment with a nutritionist as directed by our staff.  Date Last Reviewed: 6/20/2015  © 3976-5686 [employer]. [address] [place] [address], [place] [address]. All rights reserved. This information is not intended as a substitute for professional medical care. Always follow your healthcare professional's instructions.

## 2017-04-08 NOTE — PROGRESS NOTES
Please forward this important TCC information to your provider in order to maximize the post discharge care delivery of this patient.    C3 nurse spoke with Hamlet Terrell for a TCC post hospital discharge follow up call. The patient does not have a scheduled HOSFU appointment with Carlin Swift MD within 7 days post hospital discharge date 4/6/17. C3 nurse was unable to schedule HOSFU appointment in [facility].    Informed patient he needs to call PCP to schedule HOSFU by 4/13/17 d/t repeated ER visits & hosp admits.  d/t (high LACE score it is recommended patient be seen within 7 days of discharge home.)  Unable to route need for HOSFU appointment information to community provider.        Respectfully,  Lizett Gary RN, CDE  [facility]    [email or web address]       Please do not reply to this message, as this inbox is not routinely monitored.

## 2017-04-12 NOTE — PHYSICIAN QUERY
PT Name: Hamlet Terrell  MR #: [id number]     Physician Query Form - Documentation Clarification      CDS/: Christy Morrow               Contact information: [email or web address]    This form is a permanent document in the medical record.     Query Date: April 12, 2017    By submitting this query, we are merely seeking further clarification of documentation. Please utilize your independent clinical judgment when addressing the question(s) below.    The Medical record reflects the following:    Supporting Clinical Findings Location in Medical Record      Acute on chronic systolic CHF (congestive heart failure)        Discharge summary, H&P     Chronic combined systolic and diastolic congestive heart failure      CONCLUSIONS     1 - Eccentric hypertrophy.     2 - Severely depressed left ventricular systolic function (EF 25-30%).     3 - Left ventricular diastolic dysfunction.     4 - Biatrial enlargement.     5 - Right ventricular enlargement with mildly to moderately depressed systolic function.     6 - Pulmonary hypertension. The estimated PA systolic pressure is 45 mmHg.     7 - Mild mitral regurgitation.     8 - Mild tricuspid regurgitation.     9 - Increased central venous pressure.       H&P         4-2-17 2D Echo                                                                            Doctor, Please specify diagnosis or diagnoses associated with above clinical findings.    Provider Use Only      [ X ]  Acute on chronic systolic congestive heart failure      [  ]  Acuate on chronic combines systolic and diastolic congestive heart failure      [  ]  Other, please specify      [  ] Clinically undetermined

## 2017-04-12 NOTE — PHYSICIAN QUERY
PT Name: Hamlet Terrell  MR #: [id number]  Physician Query Form - CKD Clarification     CDS/: Christy Morrow               Contact information: [email or web address]  This form is a permanent document in the medical record.     Query Date: April 12, 2017    By submitting this query, we are merely seeking further clarification of documentation. Please utilize your independent clinical judgment when addressing the question(s) below.    The Medical record contains the following:     Indicators   Supporting Clinical Findings   Location in Medical Record    x CKD or Chronic Kidney (Renal) Failure / Disease  chronic kidney disease  H&P and progress notes   x BUN/Creatinine                          GFR  .eGFR --- >60.0 ;  57.2   Lab results 4-1-17 and 4-4-17    Dehydration      Nausea / Vomiting      Dialysis / CRRT      x Medication  IV lasix;  hypovolemia   H&P and notes    x Treatment      Other Chronic Conditions      Other       Provider, please further specify the stage of CKD.      [  ] Chronic Kidney Disease (CKD) (please specify stage* below)       National Kidney foundation Definitions  Stage Description  eGFR (mL/min)   [  ]     I Slight kidney damage with normal or increased filtration 90+   [  ]     II Mildly reduced kidney function 60-89   [ X ]     III Moderately reduced kidney function 30-59   [  ]     IV Severely reduced kidney function 15-29   [  ]     V Kidney failure, requiring transplant or dialysis <15     [  ] CKD on Chronic Hemodialysis (please specify stage*): __________  [  ] End Stage Renal Disease (ESRD)  [  ] Other (please specify): ________________________  [  ] Clinically Undetermined    Please document in your progress notes daily for the duration of treatment until resolved and include in your discharge summary.

## 2017-05-10 ENCOUNTER — HOSPITAL ENCOUNTER (EMERGENCY)
Facility: HOSPITAL | Age: 52
Discharge: HOME | End: 2017-05-10
Attending: FAMILY MEDICINE
Payer: MEDICAID

## 2017-05-10 VITALS
HEIGHT: 69 IN | BODY MASS INDEX: 38.51 KG/M2 | RESPIRATION RATE: 18 BRPM | TEMPERATURE: 99 F | OXYGEN SATURATION: 93 % | HEART RATE: 66 BPM | DIASTOLIC BLOOD PRESSURE: 79 MMHG | WEIGHT: 260 LBS | SYSTOLIC BLOOD PRESSURE: 124 MMHG

## 2017-05-10 DIAGNOSIS — M25.422 EFFUSION OF LEFT ELBOW: Primary | ICD-10-CM
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
## 2017-05-10 DIAGNOSIS — S59.902A INJURY OF LEFT ELBOW, INITIAL ENCOUNTER: ICD-10-CM

## 2017-05-10 PROCEDURE — 99283 EMERGENCY DEPT VISIT LOW MDM: CPT

## 2017-05-10 PROCEDURE — 99283 EMERGENCY DEPT VISIT LOW MDM: CPT | Mod: ,,, | Performed by: FAMILY MEDICINE

## 2017-05-10 RX ORDER — TRAMADOL HYDROCHLORIDE 50 MG/1
50-100 TABLET ORAL EVERY 6 HOURS PRN
Qty: 20 TABLET | Refills: 0 | Status: ON HOLD | OUTPATIENT
Start: 2017-05-10 | End: 2017-10-27 | Stop reason: HOSPADM

## 2017-05-10 RX ORDER — ONDANSETRON 2 MG/ML
4 INJECTION INTRAMUSCULAR; INTRAVENOUS
Status: DISCONTINUED | OUTPATIENT
Start: 2017-05-10 | End: 2017-05-10 | Stop reason: HOSPADM

## 2017-05-10 RX ORDER — HYDROMORPHONE HYDROCHLORIDE 1 MG/ML
2 INJECTION, SOLUTION INTRAMUSCULAR; INTRAVENOUS; SUBCUTANEOUS
Status: DISCONTINUED | OUTPATIENT
Start: 2017-05-10 | End: 2017-05-10 | Stop reason: HOSPADM

## 2017-05-10 NOTE — ED AVS SNAPSHOT
[facility]  [address]  [facility] [address]               Hamlet Terrell   5/10/2017  4:33 PM   ED    Description:  Male : 1965   Department:  [facility]           Your Care was Coordinated By:     Provider Role From To    Roger Cristobal MD Attending Provider 05/10/17 5677 --      Reason for Visit     Elbow injury           Diagnoses this Visit        Comments    Effusion of left elbow    -  Primary     Injury of left elbow, initial encounter           ED Disposition     ED Disposition Condition Comment    Discharge             To Do List           Follow-up Information     Follow up with [facility] In 1 week.    Specialty:  Orthopedics    Why:  for repeat elbow evaluation and repeat x-rays.     Contact information:    [address]  [facility] [address] [phone]    Additional information:    [facility]       These Medications        Disp Refills Start End    tramadol (ULTRAM) 50 mg tablet 20 tablet 0 5/10/2017     Take 1-2 tablets ( mg total) by mouth every 6 (six) hours as needed for Pain. - Oral    Pharmacy: [facility] [address] [place] [address] AT [facility] #: [phone]         [facility] On [facility]     [facility] - 24/7 Assistance  Unless otherwise directed by your provider, please contact [facility], our nurse care line that is available for 24/7 assistance.     Registered nurses in the [facility] provide: appointment scheduling, clinical advisement, health education, and other advisory services.  Call: [phone] (toll free)               Medications           Message regarding Medications     Verify the changes and/or additions to your medication regime listed below are the same as discussed with your clinician today.  If any of these changes or additions are incorrect, please notify your  healthcare provider.        START taking these NEW medications        Refills    tramadol (ULTRAM) 50 mg tablet 0    Sig: Take 1-2 tablets ( mg total) by mouth every 6 (six) hours as needed for Pain.    Class: Print    Route: Oral      These medications were administered today        Dose Freq    HYDROmorphone injection 2 mg 2 mg ED 1 Time    Sig: Inject 2 mLs (2 mg total) into the muscle ED 1 Time.    Class: Normal    Route: Intramuscular    ondansetron injection 4 mg 4 mg ED 1 Time    Sig: Inject 4 mg into the muscle ED 1 Time.    Class: Normal    Route: Intramuscular           Verify that the below list of medications is an accurate representation of the medications you are currently taking.  If none reported, the list may be blank. If incorrect, please contact your healthcare provider. Carry this list with you in case of emergency.           Current Medications     carvedilol (COREG) 25 MG tablet Take 0.5 tablets (12.5 mg total) by mouth 2 (two) times daily.    gabapentin (NEURONTIN) 400 MG capsule Take 1 capsule (400 mg total) by mouth 2 (two) times daily. For chronic heel/foot pain    hydrALAZINE (APRESOLINE) 25 MG tablet Take 1 tablet (25 mg total) by mouth every 8 (eight) hours.    isosorbide dinitrate (ISORDIL) 20 MG tablet Take 1 tablet (20 mg total) by mouth 3 (three) times daily.    losartan (COZAAR) 25 MG tablet Take 0.5 tablets (12.5 mg total) by mouth once daily.    nitroGLYCERIN (NITROSTAT) 0.4 MG SL tablet Place 1 tablet (0.4 mg total) under the tongue every 5 (five) minutes as needed for Chest pain. Tablet, Sublingual Sublingual    potassium chloride SA (K-DUR,KLOR-CON) 20 MEQ tablet Take by mouth once daily. Patient reports taking 25 mg daily    torsemide (DEMADEX) 20 MG Tab Take 2 tablets (40 mg total) by mouth 2 (two) times daily.    warfarin (COUMADIN) 7.5 MG tablet Take 1.5 tablets (11.25 mg total) by mouth Daily.    aspirin 81 MG Chew Take 1 tablet (81 mg total) by mouth once daily.     "HYDROmorphone injection 2 mg Inject 2 mLs (2 mg total) into the muscle ED 1 Time.    ondansetron injection 4 mg Inject 4 mg into the muscle ED 1 Time.    tramadol (ULTRAM) 50 mg tablet Take 1-2 tablets ( mg total) by mouth every 6 (six) hours as needed for Pain.           Clinical Reference Information           Your Vitals Were     BP Pulse Temp Resp Height Weight    124/79 (BP Location: Right arm, Patient Position: Sitting) 66 98.6 °F (37 °C) (Oral) 18 5' 9" (1.753 m) 117.9 kg (260 lb)    SpO2 BMI             93% 38.4 kg/m2         Allergies as of 5/10/2017        Reactions    Acetaminophen     Itching    Oxycodone-acetaminophen     Other reaction(s): Itching    Ace Inhibitors Other (See Comments)    cough      Immunizations Administered on Date of Encounter - 5/10/2017     None      ED Micro, Lab, POCT     None      ED Imaging Orders     Start Ordered       Status Ordering Provider    05/10/17 1650 05/10/17 1649  X-Ray Humerus 2 View Left  1 time imaging      Final result     05/10/17 1650 05/10/17 1649  X-Ray Forearm Left  1 time imaging      Final result     05/10/17 1649 05/10/17 1649  X-Ray Elbow Complete Left  1 time imaging      Final result       Discharge References/Attachments     ELBOW FRACTURE (ENGLISH)      [facility] Sign-Up     Activating your [facility] account is as easy as 1-2-3!     1) Visit [email or web address], select Sign Up Now, enter this activation code and your date of birth, then select Next.  [email or web address]  Expires: 5/21/2017  3:03 PM      2) Create a username and password to use when you visit [facility] in the future and select a security question in case you lose your password and select Next.    3) Enter your e-mail address and click Sign Up!    Additional Information  If you have questions, please e-mail [email or web address] [facility] or call [phone] to talk to our [facility] staff. Remember, [facility] is NOT to be used for urgent needs. For medical emergencies, dial 911.          " [facility] complies with applicable Federal civil rights laws and does not discriminate on the basis of race, color, national origin, age, disability, or sex.        Language Assistance Services     ATTENTION: Language assistance services are available, free of charge. Please call [phone].      ATENCIÓN: Si habla nely, tiene a hernandez disposición servicios gratuitos de asistencia lingüística. Llame al [phone].     CHÚ Ý: N?u b?n nói Ti?ng Vi?t, có các d?ch v? h? tr? ngôn ng? mi?n phí dành cho b?n. G?i s? [phone].

## 2017-05-10 NOTE — ED NOTES
Pt identifiers were Hamlet Greenbergvinchecked and correct  LOC: The patient is awake, alert, aware of environment with an appropriate affect. Oriented x3, speaking appropriately  APPEARANCE: Pt resting comfortably, in no acute distress, pt is clean and well groomed, clothing properly fastened  SKIN: Skin warm, dry and intact, normal skin turgor, moist mucus membranes  RESPIRATORY: Airway is open and patent, respirations are spontaneous, even and unlabored, normal effort and rate  CARDIAC: Normal rate and rhythm, no peripheral edema noted, capillary refill < 3 seconds, bilateral radial pulses 2+  ABDOMEN: Soft, non tender, non distended. Bowel sounds present x 4 quadrants.   NEUROLOGIC: PERRLA, facial expression is symmetrical, patient moving all extremities spontaneously, normal sensation in all extremities when touched with a finger.  Follows all commands appropriately  MUSCULOSKELETAL: No obvious deformities. Pt c/o left elbow pain d/t fall.

## 2017-05-10 NOTE — ED PROVIDER NOTES
Encounter Date: 5/10/2017       History     Chief Complaint   Patient presents with    Elbow injury     Pt with left elbow pain after slipping and falling yesterday.     Review of patient's allergies indicates:   Allergen Reactions    Acetaminophen      Itching    Oxycodone-acetaminophen      Other reaction(s): Itching    Ace inhibitors Other (See Comments)     cough     HPI patient is a 51-year-old male who slipped and fell on a wet floor with the cleaning agent on it last night.  He landed on the point of his left elbow.  He complains of pain and swelling and increasingly limited range of motion of the elbow.  Over the subsequent 24 hours.  Patient denies head injury or other acute injuries.  He can move his fingers well, but is unable to pronate or supinate his wrist or forearm swelling swelling from the elbow now forYou should start taking Prilosec OTC or generic Omeprazole 20 mg daily.  This will shut down most of your stomach's acid production.  IT usually takes 3-4 days to relieve your symptoms.  This is because the stomach has a 2-3 day supply of acid already stored up.  The prilosec does not affect this acid and so the stomach will have to use up this acid before you notice improvement.  You can use OTC MAalox and Zantac for the first few days.  This will help neutralize the stomach acid you have left.    You need to keep taking the Prilosec DAILY for about two months in order to keep the stomach acid pump suppressed.  You need to take it daily EVEN if you feel fine, so your stomach lining can heal.  If you only take it when you have pain, it will not work very well because the stomach will start making acid on the days you skip it, which will irritate the stomach lining and slow down healing.  Proceeding down the arm distal to the hand.  No prior elbow injuries.  No other acute injuries associated with this with this accident.  Patient denies fever, chills, chest or abdominal pain, change in bowel or  bladder habits  Past Medical History:   Diagnosis Date    Anticoagulant long-term use     CHF (congestive heart failure)     Chronic combined systolic and diastolic congestive heart failure     Coronary artery disease     CVA (cerebrovascular accident)     Heart attack 2006    Hypertension     Hyperthyroidism, subclinical 1/2/2013    Paroxysmal atrial fibrillation     S/P ablation of atrial flutter 2008    Stroke 2009    no residual weaknesses     Past Surgical History:   Procedure Laterality Date    RADIOFREQUENCY ABLATION  01/08/2008    for atrial flutter     Family History   Problem Relation Age of Onset    Hypertension Mother     Stroke Mother     Hypertension Father     Alcohol abuse Father     Hypertension Sister     Hypertension Brother      Social History   Substance Use Topics    Smoking status: Never Smoker    Smokeless tobacco: None    Alcohol use No     Review of Systems   Constitutional: Negative for diaphoresis.   HENT: Negative for facial swelling, nosebleeds and trouble swallowing.    Eyes: Negative for visual disturbance.   Respiratory: Negative for chest tightness and shortness of breath.    Cardiovascular: Negative for chest pain.   Gastrointestinal: Negative for abdominal distention, blood in stool, diarrhea and vomiting.   Genitourinary: Negative for difficulty urinating and hematuria.   Musculoskeletal: Negative for gait problem and joint swelling.   Skin: Negative for pallor and wound.   Neurological: Negative for seizures, syncope, facial asymmetry, speech difficulty and light-headedness.   Psychiatric/Behavioral: Negative for agitation, confusion and decreased concentration.       Physical Exam   Initial Vitals   BP Pulse Resp Temp SpO2   05/10/17 1536 05/10/17 1536 05/10/17 1536 05/10/17 1536 05/10/17 1536   124/79 66 18 98.6 °F (37 °C) 93 %     Physical Exam    Constitutional: He appears well-developed and well-nourished. He is not diaphoretic.   HENT:   Head:  Normocephalic.   Right Ear: External ear normal.   Left Ear: External ear normal.   Mouth/Throat: Oropharynx is clear and moist. No oropharyngeal exudate.   Eyes: Conjunctivae and EOM are normal. Pupils are equal, round, and reactive to light. No scleral icterus.   Neck: Normal range of motion. Neck supple.   Cardiovascular: Normal rate, regular rhythm and normal heart sounds. Exam reveals no gallop.    No murmur heard.  Pulmonary/Chest: Breath sounds normal. He has no wheezes. He has no rhonchi. He exhibits no tenderness.   Abdominal: Soft. Bowel sounds are normal. He exhibits no distension. There is no tenderness. There is no guarding.   Musculoskeletal:   Abnormal musculoskeletal findings limited to the left upper extremity.  Patient's elbow is tender with limited range of motion is swollen from approximately 3 cm above the antecubital fossa all the way down the wrist.  Her vascular is intact.  Limited range of motion of the elbow, wrist and hand fine motor control of the fingers is unchanged.  Pulses present and equal to the uninjured side   Neurological: He is oriented to person, place, and time. He has normal strength and normal reflexes. No cranial nerve deficit.   Skin: Skin is warm and dry.         ED Course   Procedures  Labs Reviewed - No data to display          Medical Decision Making:   ED Management:  Elbow x-ray showed no fracture or dislocation.  There is however hemarthrosis suggestive of an occult fracture.  The patient is placed in a left arm sling, given instructions for orthopedic follow-up.  Prescription for hydrocodone for pain control and is stable for discharge                   ED Course     Clinical Impression:   The primary encounter diagnosis was Effusion of left elbow. A diagnosis of Injury of left elbow, initial encounter was also pertinent to this visit.          Roger Cristobal MD  05/13/17 1317       Roger Cristobal MD  05/13/17 2169

## 2017-05-10 NOTE — ED NOTES
Pt presented to the ED c/o left elbow injury. Pt states he slipped in fell on the floor. Pt states he landed on his left elbow. Pt denies loc. Pt alert.

## 2017-06-14 ENCOUNTER — HOSPITAL ENCOUNTER (EMERGENCY)
Facility: HOSPITAL | Age: 52
Discharge: HOME | End: 2017-06-14
Attending: EMERGENCY MEDICINE | Admitting: EMERGENCY MEDICINE
Payer: MEDICAID

## 2017-06-14 VITALS
SYSTOLIC BLOOD PRESSURE: 128 MMHG | HEART RATE: 69 BPM | DIASTOLIC BLOOD PRESSURE: 61 MMHG | RESPIRATION RATE: 18 BRPM | OXYGEN SATURATION: 94 % | TEMPERATURE: 100 F

## 2017-06-14 DIAGNOSIS — M25.569 KNEE PAIN: ICD-10-CM

## 2017-06-14 DIAGNOSIS — M25.561 ACUTE PAIN OF RIGHT KNEE: Primary | ICD-10-CM

## 2017-06-14 DIAGNOSIS — M79.89 RIGHT LEG SWELLING: ICD-10-CM
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
## 2017-06-14 LAB
ANION GAP SERPL CALC-SCNC: 10 MMOL/L
BASOPHILS # BLD AUTO: 0.02 K/UL
BASOPHILS NFR BLD: 0.2 %
BUN SERPL-MCNC: 14 MG/DL
CALCIUM SERPL-MCNC: 9.3 MG/DL
CHLORIDE SERPL-SCNC: 95 MMOL/L
CO2 SERPL-SCNC: 34 MMOL/L
CREAT SERPL-MCNC: 1.1 MG/DL
CRP SERPL-MCNC: 52.9 MG/L
DIFFERENTIAL METHOD: ABNORMAL
EOSINOPHIL # BLD AUTO: 0.3 K/UL
EOSINOPHIL NFR BLD: 3.2 %
ERYTHROCYTE [DISTWIDTH] IN BLOOD BY AUTOMATED COUNT: 14.1 %
EST. GFR  (AFRICAN AMERICAN): >60 ML/MIN/1.73 M^2
EST. GFR  (NON AFRICAN AMERICAN): >60 ML/MIN/1.73 M^2
GLUCOSE SERPL-MCNC: 94 MG/DL
HCT VFR BLD AUTO: 36.1 %
HGB BLD-MCNC: 11.7 G/DL
LYMPHOCYTES # BLD AUTO: 1.7 K/UL
LYMPHOCYTES NFR BLD: 18.9 %
MCH RBC QN AUTO: 28.3 PG
MCHC RBC AUTO-ENTMCNC: 32.4 %
MCV RBC AUTO: 87 FL
MONOCYTES # BLD AUTO: 1.6 K/UL
MONOCYTES NFR BLD: 18.4 %
NEUTROPHILS # BLD AUTO: 5.2 K/UL
NEUTROPHILS NFR BLD: 59.3 %
PLATELET # BLD AUTO: 237 K/UL
PMV BLD AUTO: 9.6 FL
POTASSIUM SERPL-SCNC: 3.7 MMOL/L
RBC # BLD AUTO: 4.13 M/UL
SODIUM SERPL-SCNC: 139 MMOL/L
WBC # BLD AUTO: 8.75 K/UL

## 2017-06-14 PROCEDURE — 63600175 PHARM REV CODE 636 W HCPCS: Performed by: PHYSICIAN ASSISTANT

## 2017-06-14 PROCEDURE — 86140 C-REACTIVE PROTEIN: CPT

## 2017-06-14 PROCEDURE — 85025 COMPLETE CBC W/AUTO DIFF WBC: CPT

## 2017-06-14 PROCEDURE — 99285 EMERGENCY DEPT VISIT HI MDM: CPT | Mod: ,,, | Performed by: PHYSICIAN ASSISTANT

## 2017-06-14 PROCEDURE — 99284 EMERGENCY DEPT VISIT MOD MDM: CPT | Mod: 25

## 2017-06-14 PROCEDURE — 80048 BASIC METABOLIC PNL TOTAL CA: CPT

## 2017-06-14 PROCEDURE — 96374 THER/PROPH/DIAG INJ IV PUSH: CPT

## 2017-06-14 PROCEDURE — 25000003 PHARM REV CODE 250: Performed by: PHYSICIAN ASSISTANT

## 2017-06-14 PROCEDURE — 29505 APPLICATION LONG LEG SPLINT: CPT

## 2017-06-14 RX ORDER — OXYCODONE HYDROCHLORIDE 5 MG/1
5 TABLET ORAL
Status: COMPLETED | OUTPATIENT
Start: 2017-06-14 | End: 2017-06-14

## 2017-06-14 RX ORDER — OXYCODONE HYDROCHLORIDE 5 MG/1
5 CAPSULE ORAL EVERY 4 HOURS PRN
Qty: 8 CAPSULE | Refills: 0 | Status: ON HOLD | OUTPATIENT
Start: 2017-06-14 | End: 2017-10-27 | Stop reason: HOSPADM

## 2017-06-14 RX ORDER — MORPHINE SULFATE 2 MG/ML
4 INJECTION, SOLUTION INTRAMUSCULAR; INTRAVENOUS
Status: COMPLETED | OUTPATIENT
Start: 2017-06-14 | End: 2017-06-14

## 2017-06-14 RX ADMIN — MORPHINE SULFATE 4 MG: 2 INJECTION, SOLUTION INTRAMUSCULAR; INTRAVENOUS at 05:06

## 2017-06-14 RX ADMIN — OXYCODONE HYDROCHLORIDE 5 MG: 5 TABLET ORAL at 04:06

## 2017-06-14 NOTE — ED NOTES
"Pt requesting pain medication. Pt states his pain is still "off the charts." AYESHA Post, made aware.  "

## 2017-06-14 NOTE — ED PROVIDER NOTES
Encounter Date: 6/14/2017       History     Chief Complaint   Patient presents with    Knee Pain     Right knee pain after falling on it few days ago when it was raining.      Review of patient's allergies indicates:   Allergen Reactions    Acetaminophen      Itching    Oxycodone-acetaminophen      Other reaction(s): Itching    Ace inhibitors Other (See Comments)     cough     51-year-old -American male with past medical history of hypertension, CHF, A. fib, CAD presents to the ED complaining of right knee pain.  He was walking in the rain when he slipped and fell landing onto the right knee a few days ago.  He states that he is unable to bear any weight onto the leg.  At rest, he reports his pain is 8/10 that worsens to 10/10 with movement or weightbearing.  He has taken at home tramadol with no relief.  He denies any past surgical history of the right knee.  He has had some swelling that has been worsening since the fall.  He denies fever, chills, chest pain, shortness breath, abdominal pain, nausea, vomiting, numbness, weakness, paresthesias.      The history is provided by the patient.     Past Medical History:   Diagnosis Date    Anticoagulant long-term use     CHF (congestive heart failure)     Chronic combined systolic and diastolic congestive heart failure     Coronary artery disease     CVA (cerebrovascular accident)     Heart attack 2006    Hypertension     Hyperthyroidism, subclinical 1/2/2013    Paroxysmal atrial fibrillation     S/P ablation of atrial flutter 2008    Stroke 2009    no residual weaknesses     Past Surgical History:   Procedure Laterality Date    RADIOFREQUENCY ABLATION  01/08/2008    for atrial flutter     Family History   Problem Relation Age of Onset    Hypertension Mother     Stroke Mother     Hypertension Father     Alcohol abuse Father     Hypertension Sister     Hypertension Brother      Social History   Substance Use Topics    Smoking status: Never  Smoker    Smokeless tobacco: Not on file    Alcohol use No     Review of Systems   Constitutional: Negative for chills and fever.   HENT: Negative for congestion, rhinorrhea and sore throat.    Eyes: Negative for photophobia and visual disturbance.   Respiratory: Negative for shortness of breath.    Cardiovascular: Negative for chest pain.   Gastrointestinal: Negative for abdominal pain, constipation, diarrhea, nausea and vomiting.   Genitourinary: Negative for dysuria and hematuria.   Musculoskeletal: Positive for arthralgias, gait problem, joint swelling and myalgias. Negative for neck pain and neck stiffness.   Skin: Negative for rash and wound.   Neurological: Negative for dizziness, syncope, weakness, light-headedness, numbness and headaches.   Psychiatric/Behavioral: Negative for confusion.       Physical Exam     Initial Vitals [06/14/17 1359]   BP Pulse Resp Temp SpO2   128/61 69 18 100 °F (37.8 °C) (!) 94 %     Physical Exam    Nursing note and vitals reviewed.  Constitutional: He appears well-developed and well-nourished. He is not diaphoretic. No distress.   HENT:   Head: Normocephalic and atraumatic.   Neck: Normal range of motion. Neck supple.   Cardiovascular: Normal rate, regular rhythm and normal heart sounds. Exam reveals no gallop and no friction rub.    No murmur heard.  Pulmonary/Chest: Breath sounds normal. He has no wheezes. He has no rhonchi. He has no rales.   Abdominal: Soft. Bowel sounds are normal. There is no tenderness. There is no rebound and no guarding.   Musculoskeletal:        Right knee: He exhibits decreased range of motion, swelling and bony tenderness. Tenderness found.        Right ankle: He exhibits swelling. He exhibits normal range of motion.        Right lower leg: He exhibits swelling. He exhibits no tenderness and no bony tenderness.   Edema noted to RLE. R knee tender to light palpation. Warmth of the R knee noted. No erythema.    Neurological: He is alert and oriented  to person, place, and time.   Skin: Skin is warm and dry. No rash noted. No erythema.   Psychiatric: He has a normal mood and affect.         ED Course   Procedures  Labs Reviewed   CBC W/ AUTO DIFFERENTIAL - Abnormal; Notable for the following:        Result Value    RBC 4.13 (*)     Hemoglobin 11.7 (*)     Hematocrit 36.1 (*)     Mono # 1.6 (*)     Mono% 18.4 (*)     All other components within normal limits   BASIC METABOLIC PANEL - Abnormal; Notable for the following:     CO2 34 (*)     All other components within normal limits   C-REACTIVE PROTEIN - Abnormal; Notable for the following:     CRP 52.9 (*)     All other components within normal limits        Imaging Results          US Lower Extremity Veins Bilateral (Final result)  Result time 06/14/17 18:53:34    Final result by Eduarda Schafer MD (06/14/17 18:53:34)                 Impression:      No evidence of venous thrombosis.  ______________________________________     Electronically signed by resident: FRANCISCA CLEMENTS MD  Date:     06/14/17  Time:    18:44            As the supervising and teaching physician, I personally reviewed the images and resident's interpretation and I agree with the findings.            Electronically signed by: EDUARDA SCHAFER MD  Date:     06/14/17  Time:    18:53              Narrative:    ULTRASOUND VENOUS BILATERAL LOWER EXTREMITIES    INDICATION: Soft tissue disorder.    COMPARISON: 4/3/2017.    TECHNIQUE:  Ultrasonographic images of bilateral common femoral veins, greater saphenous veins, superficial femoral veins, posterior tibial veins, anterior tibial veins, peroneal veins, and popliteal veins were obtained utilizing compression technique and doppler spectral imaging.     FINDINGS:   The bilateral common femoral, greater saphenous, superficial femoral, popliteal, anterior tibial, posterior tibial, and peroneal veins are patent with normal waveforms.                             X-Ray Knee 1 or 2 View Right (Final  result)  Result time 06/14/17 17:18:21   Procedure changed from X-Ray Knee 3 View Right     Final result by Dylan Puri MD (06/14/17 17:18:21)                 Impression:      No acute fracture or dislocation.      Electronically signed by: DYLAN PURI MD  Date:     06/14/17  Time:    17:18              Narrative:    AP, lateral views of the  knee and lower leg    Comparison: None available.    Findings:    There is no fracture or dislocation. Diffuse soft tissue edema. There is no joint effusion. Cartilage spaces are maintained. Enthesopathy of the patella.                             X-Ray Tibia Fibula 2 View Right (Final result)  Result time 06/14/17 17:18:20    Final result by Dylan Puri MD (06/14/17 17:18:20)                 Impression:      No acute fracture or dislocation.      Electronically signed by: DYLAN PURI MD  Date:     06/14/17  Time:    17:18              Narrative:    AP, lateral views of the  knee and lower leg    Comparison: None available.    Findings:    There is no fracture or dislocation. Diffuse soft tissue edema. There is no joint effusion. Cartilage spaces are maintained. Enthesopathy of the patella.                                 Medical Decision Making:   History:   Old Medical Records: I decided to obtain old medical records.  Clinical Tests:   Lab Tests: Ordered and Reviewed  Radiological Study: Ordered and Reviewed       APC / Resident Notes:   51-year-old -American male with past medical history of hypertension, CHF, A. fib, CAD presents to the ED complaining of right knee pain. VSS. RRR without murmurs. Lungs CTA bilaterally. Abdomen soft and nontender. Swelling noted of the R knee and RLE. There is bilateral LE edema (R>L). There is warmth of the R knee and pain with minimal palpation. He has decreased ROM of the R knee secondary to pain. DDx includes but is not limited to fracture, dislocation, DVT, arthritis. Will get xray R knee for further  evaluation.    Xray of the R knee and R tib/fib does not show any acute fracture or dislocation.    LE ultrasound with no DVT.    CBC with no leukocytosis with WBC 8.75. CRP elevated at 52.9 - likely from trauma and inflammation. I do not suspect septic arthritis.    ACE wrap and knee immobilizer placed on R knee. He was provided with crutches. He will follow up with his PCP and orthopedics. He was given strict return precautions. He was discharged with a prescription for oxycodone.  All of the patient's questions were answered.  I reviewed the patient's chart, labs, and imaging and discussed the case with my supervising physician.                 ED Course     Clinical Impression:   The primary encounter diagnosis was Acute pain of right knee. Diagnoses of Knee pain, Fall, and Right leg swelling were also pertinent to this visit.    Disposition:   Disposition: Discharged  Condition: Stable       Sejal Ballard PA-C  06/14/17 [id number]

## 2017-06-14 NOTE — ED TRIAGE NOTES
Patient states he fell in rain 2 days ago, c/o right knee pain. Slipped and fell, no LOC. Tramadol 100mg not helping.

## 2017-06-14 NOTE — ED NOTES
Patient identifiers verified and correct for Mr Terrell  C/C: knee pain  APPEARANCE: awake and alert in NAD.  SKIN: warm, dry and intact. No breakdown or bruising.  MUSCULOSKELETAL: Patient moving all extremities spontaneously, no obvious swelling or deformities noted. Ambulates independently.  RESPIRATORY: no shortness of breath.Respirations unlabored. All breath sounds CTA bilaterally.  CARDIAC: heart tones normal. Regular rate and rhythm; 2+ distal pulses; no peripheral edema  ABDOMEN: S/ND/NT, Denies nausea, normoactive bowel sounds present in all four quadrants.   : voids spontaneously without difficulty.  Neurologic: AAO x 4; follows commands equal strength in all extremities; denies numbness/tingling. PERRLA

## 2017-08-12 ENCOUNTER — HOSPITAL ENCOUNTER (EMERGENCY)
Facility: HOSPITAL | Age: 52
Discharge: HOME | End: 2017-08-13
Attending: EMERGENCY MEDICINE
Payer: MEDICAID

## 2017-08-12 DIAGNOSIS — R10.9 FLANK PAIN: ICD-10-CM

## 2017-08-12 DIAGNOSIS — M54.9 BACK PAIN: ICD-10-CM

## 2017-08-12 LAB
ALBUMIN SERPL BCP-MCNC: 3.4 G/DL
ALP SERPL-CCNC: 149 U/L
ALT SERPL W/O P-5'-P-CCNC: 10 U/L
ANION GAP SERPL CALC-SCNC: 8 MMOL/L
AST SERPL-CCNC: 18 U/L
BASOPHILS # BLD AUTO: 0.04 K/UL
BASOPHILS NFR BLD: 0.7 %
BILIRUB SERPL-MCNC: 1.3 MG/DL
BUN SERPL-MCNC: 17 MG/DL
BUN SERPL-MCNC: 17 MG/DL (ref 6–30)
CALCIUM SERPL-MCNC: 9.1 MG/DL
CHLORIDE SERPL-SCNC: 104 MMOL/L (ref 95–110)
CHLORIDE SERPL-SCNC: 107 MMOL/L
CO2 SERPL-SCNC: 30 MMOL/L
CREAT SERPL-MCNC: 1.3 MG/DL
CREAT SERPL-MCNC: 1.3 MG/DL (ref 0.5–1.4)
DIFFERENTIAL METHOD: ABNORMAL
EOSINOPHIL # BLD AUTO: 0.2 K/UL
EOSINOPHIL NFR BLD: 4 %
ERYTHROCYTE [DISTWIDTH] IN BLOOD BY AUTOMATED COUNT: 14.6 %
EST. GFR  (AFRICAN AMERICAN): >60 ML/MIN/1.73 M^2
EST. GFR  (NON AFRICAN AMERICAN): >60 ML/MIN/1.73 M^2
GLUCOSE SERPL-MCNC: 96 MG/DL
GLUCOSE SERPL-MCNC: 97 MG/DL (ref 70–110)
HCT VFR BLD AUTO: 34.4 %
HCT VFR BLD CALC: 36 %PCV (ref 36–54)
HGB BLD-MCNC: 11.2 G/DL
INR PPP: 1.6
LIPASE SERPL-CCNC: 52 U/L
LYMPHOCYTES # BLD AUTO: 1.2 K/UL
LYMPHOCYTES NFR BLD: 20.9 %
MCH RBC QN AUTO: 28.4 PG
MCHC RBC AUTO-ENTMCNC: 32.6 G/DL
MCV RBC AUTO: 87 FL
MONOCYTES # BLD AUTO: 1 K/UL
MONOCYTES NFR BLD: 17.6 %
NEUTROPHILS # BLD AUTO: 3.1 K/UL
NEUTROPHILS NFR BLD: 56.4 %
PLATELET # BLD AUTO: 189 K/UL
PMV BLD AUTO: 10 FL
POC IONIZED CALCIUM: 1.2 MMOL/L (ref 1.06–1.42)
POC TCO2 (MEASURED): 30 MMOL/L (ref 23–29)
POTASSIUM BLD-SCNC: 3.7 MMOL/L (ref 3.5–5.1)
POTASSIUM SERPL-SCNC: 3.7 MMOL/L
PROT SERPL-MCNC: 7.5 G/DL
PROTHROMBIN TIME: 16 SEC
RBC # BLD AUTO: 3.94 M/UL
SAMPLE: ABNORMAL
SODIUM BLD-SCNC: 147 MMOL/L (ref 136–145)
SODIUM SERPL-SCNC: 145 MMOL/L
WBC # BLD AUTO: 5.51 K/UL

## 2017-08-12 PROCEDURE — 93010 ELECTROCARDIOGRAM REPORT: CPT | Mod: ,,, | Performed by: INTERNAL MEDICINE

## 2017-08-12 RX ORDER — MORPHINE SULFATE 2 MG/ML
6 INJECTION, SOLUTION INTRAMUSCULAR; INTRAVENOUS
Status: COMPLETED | OUTPATIENT
Start: 2017-08-12 | End: 2017-08-12

## 2017-08-12 RX ORDER — HYDROCODONE BITARTRATE AND ACETAMINOPHEN 5; 325 MG/1; MG/1
1 TABLET ORAL EVERY 4 HOURS PRN
Qty: 12 TABLET | Refills: 0 | Status: ON HOLD | OUTPATIENT
Start: 2017-08-12 | End: 2017-10-27 | Stop reason: HOSPADM

## 2017-08-12 RX ORDER — MORPHINE SULFATE 2 MG/ML
4 INJECTION, SOLUTION INTRAMUSCULAR; INTRAVENOUS
Status: COMPLETED | OUTPATIENT
Start: 2017-08-12 | End: 2017-08-12

## 2017-08-12 RX ADMIN — MORPHINE SULFATE 4 MG: 2 INJECTION, SOLUTION INTRAMUSCULAR; INTRAVENOUS at 07:08

## 2017-08-12 RX ADMIN — IOHEXOL 100 ML: 350 INJECTION, SOLUTION INTRAVENOUS at 10:08

## 2017-08-12 RX ADMIN — MORPHINE SULFATE 6 MG: 2 INJECTION, SOLUTION INTRAMUSCULAR; INTRAVENOUS at 08:08

## 2017-08-12 NOTE — ED TRIAGE NOTES
Presents to ER with bilateral flank pain for 2 days.  Patient has not voided since this am.  Has edema BLE and is SOB with minimal effort.  HX of Cardiomegally, CHF and AFib.    GENERAL: The patient is well-developed and well-nourished in no apparent distress. Alert and oriented x4.                                                HEENT: Head is normocephalic and atraumatic. Extraocular muscles are intact. Pupils are equal, round, and reactive to light and accommodation. Nares appeared normal. Mouth is well hydrated and without lesions. Mucous membranes are moist. Posterior pharynx clear of any exudate or lesions.    NECK: Supple. No carotid bruits. No lymphadenopathy or thyromegaly.    LUNGS: Clear to auscultation.    HEART: Irregular.  EKG performed.    ABDOMEN: Soft, nontender, and nondistended. Positive bowel sounds. No hepatosplenomegaly was noted.     EXTREMITIES: Without any cyanosis, clubbing, rash, lesions or edema.     NEUROLOGIC: Cranial nerves II through XII are grossly intact.     PSYCHIATRIC: Flat affect, but denies suicidal or homicidal ideations.    SKIN: No ulceration or induration present.

## 2017-08-12 NOTE — ED PROVIDER NOTES
Encounter Date: 8/12/2017    SCRIBE #1 NOTE: I, Vira Yeung, am scribing for, and in the presence of, Dr. Milner.       History     Chief Complaint   Patient presents with    Flank Pain     L flank pain, going around, hurts when i sit and stand straight up,       The patient is a 51 y.o. male with hx of: HTN, CHF with an EF of 25% with diastolic dysfunction, A-Fib on coumadin, MI, stroke, and hyperthyroidism that presents to the ED for evaluation of mid bilateral back pain x 2 days. Pain is worsened with movement. Patient states he was standing up when the pain started. Pain does not radiate to his legs. Admits to left sided and right sided abdominal pain to palpation and SOB(unchanged from baseline). Denies dysuria, fever, chills, or CP. He notes his ankles are less swollen than usual. Patient denies having similar pain in the past before. Denies trauma. No further concerns or complaints at this time.      The history is provided by the patient and medical records.     Review of patient's allergies indicates:   Allergen Reactions    Acetaminophen      Itching    Oxycodone-acetaminophen      Other reaction(s): Itching    Ace inhibitors Other (See Comments)     cough     Past Medical History:   Diagnosis Date    Anticoagulant long-term use     CHF (congestive heart failure)     Chronic combined systolic and diastolic congestive heart failure     Coronary artery disease     CVA (cerebrovascular accident)     Heart attack 2006    Hypertension     Hyperthyroidism, subclinical 1/2/2013    Paroxysmal atrial fibrillation     S/P ablation of atrial flutter 2008    Stroke 2009    no residual weaknesses     Past Surgical History:   Procedure Laterality Date    RADIOFREQUENCY ABLATION  01/08/2008    for atrial flutter     Family History   Problem Relation Age of Onset    Hypertension Mother     Stroke Mother     Hypertension Father     Alcohol abuse Father     Hypertension Sister     Hypertension  Brother      Social History   Substance Use Topics    Smoking status: Never Smoker    Smokeless tobacco: Never Used    Alcohol use No     Review of Systems   Constitutional: Negative for chills and fever.   HENT: Negative for sore throat.    Respiratory: Positive for shortness of breath (unchanged from baseline).    Cardiovascular: Negative for chest pain.   Gastrointestinal: Positive for abdominal pain.   Genitourinary: Negative for dysuria.   Musculoskeletal: Positive for back pain. Negative for neck pain.   Skin: Negative for rash.   Neurological: Negative for headaches.   Psychiatric/Behavioral: Negative for confusion.       Physical Exam     Initial Vitals [08/12/17 1627]   BP Pulse Resp Temp SpO2   (!) 140/84 67 18 98.6 °F (37 °C) 95 %      MAP       102.67         Physical Exam    Nursing note and vitals reviewed.  Constitutional: He appears well-developed and well-nourished.   Patient unable to lay flat secondary to pain.    HENT:   Head: Normocephalic and atraumatic.   Eyes: Conjunctivae are normal.   Neck: Normal range of motion.   Cardiovascular: Normal heart sounds. An irregular rhythm present.   Pulmonary/Chest: Breath sounds normal. No respiratory distress.   Clear to auscultation bilaterally.   Abdominal: Soft. There is tenderness (in LUQ and RUQ.).   Obese abdomen.   Musculoskeletal:    Tenderness on palpation of the mid back and bilateral paralumbar muscles right where the ribcage ends. Some spinal tenderness at the lower T spine.    1+ pitting edema bilateral ankles with hyperpigmentation and scaly skin from chronic changes which pt says is improved compared to previous.   Neurological: He is alert and oriented to person, place, and time.   Skin: Skin is warm and dry.         ED Course   Procedures  Labs Reviewed   CBC W/ AUTO DIFFERENTIAL - Abnormal; Notable for the following:        Result Value    RBC 3.94 (*)     Hemoglobin 11.2 (*)     Hematocrit 34.4 (*)     RDW 14.6 (*)     Mono% 17.6 (*)      All other components within normal limits   COMPREHENSIVE METABOLIC PANEL - Abnormal; Notable for the following:     CO2 30 (*)     Albumin 3.4 (*)     Total Bilirubin 1.3 (*)     Alkaline Phosphatase 149 (*)     All other components within normal limits   PROTIME-INR - Abnormal; Notable for the following:     Prothrombin Time 16.0 (*)     INR 1.6 (*)     All other components within normal limits   ISTAT PROCEDURE - Abnormal; Notable for the following:     POC Sodium 147 (*)     POC TCO2 (MEASURED) 30 (*)     All other components within normal limits   LIPASE             Medical Decision Making:   History:   Old Medical Records: I decided to obtain old medical records.       <> Summary of Records: Patient with hx of: HTN, CHF with an EF of 25% with diastolic dysfunction, A-Fib on coumadin, MI, stroke, and hyperthyroidism.  Initial Assessment:   Patient with a hx of CHF and A-Fib on coumadin presenting with atraumatic mid bilateral back pain radiating into upper abd. Unclear if on exam pain is more on chest wall vs abdominal cavity. However, pt is on coumadin. Differential includes mechanical back pain, compression fracture, retroperitoneal hematoma. Will do CT of abd/p as well as CXR, labs, give analgesics and reassess.  Clinical Tests:   Lab Tests: Ordered and Reviewed  Radiological Study: Ordered and Reviewed  Medical Tests: Ordered and Reviewed            Scribe Attestation:   Scribe #1: I performed the above scribed service and the documentation accurately describes the services I performed. I attest to the accuracy of the note.    Attending Attestation:           Physician Attestation for Scribe:  Physician Attestation Statement for Scribe #1: I, Dr. Milner, reviewed documentation, as scribed by Vira Yeung in my presence, and it is both accurate and complete.         Attending ED Notes:   9:49PM-Normal hemoglobin at 11.2, normal CMP, normal lipase, INR is 1.6. XR of chest shows cardiomegaly with small  right pleural effusion, is similar to previous. No bony lesions. CT of abd still pending. Pt will be signed over to oncoming MD at shift change.          ED Course     Clinical Impression:   Diagnoses of Flank pain and Back pain were pertinent to this visit.    Disposition:   Disposition: Discharged  Condition: Stable                        Purnima Milner MD  08/21/17 [id number]

## 2017-08-13 VITALS
SYSTOLIC BLOOD PRESSURE: 151 MMHG | DIASTOLIC BLOOD PRESSURE: 98 MMHG | OXYGEN SATURATION: 100 % | TEMPERATURE: 98 F | RESPIRATION RATE: 16 BRPM | BODY MASS INDEX: 38.51 KG/M2 | WEIGHT: 260 LBS | HEIGHT: 69 IN | HEART RATE: 79 BPM

## 2017-08-13 PROCEDURE — 96376 TX/PRO/DX INJ SAME DRUG ADON: CPT

## 2017-08-13 PROCEDURE — 99285 EMERGENCY DEPT VISIT HI MDM: CPT | Mod: ,,, | Performed by: EMERGENCY MEDICINE

## 2017-08-13 PROCEDURE — 96374 THER/PROPH/DIAG INJ IV PUSH: CPT

## 2017-08-13 PROCEDURE — 99285 EMERGENCY DEPT VISIT HI MDM: CPT | Mod: 25

## 2017-08-13 PROCEDURE — 93005 ELECTROCARDIOGRAM TRACING: CPT

## 2017-08-13 NOTE — PROVIDER PROGRESS NOTES - EMERGENCY DEPT.
Encounter Date: 8/12/2017    ED Physician Progress Notes       SCRIBE NOTE: I, Shavonne Starr, am scribing for, and in the presence of,  Dr. Sloan.  Physician Statement: I, Dr. Sloan, personally performed the services described in this documentation as scribed by Shavonne Starr in my presence, and it is both accurate and complete.     Physician Note:   Patient signed out by Dr. Milner to me at shift change in stable condition pending CT scan. CT reviewed and shows no acute process. He did had changes consistent with chronic live disease with a small amount of ascites and some urinary bladder wall thickening. The patient denies dysuria or symptoms of a UTI. Patient is stable for discharge.

## 2017-10-25 ENCOUNTER — HOSPITAL ENCOUNTER (INPATIENT)
Facility: HOSPITAL | Age: 52
LOS: 2 days | Discharge: HOME | DRG: 292 | End: 2017-10-28
Attending: EMERGENCY MEDICINE | Admitting: HOSPITALIST
Payer: MEDICAID

## 2017-10-25 DIAGNOSIS — H53.8 BLURRY VISION, BILATERAL: ICD-10-CM

## 2017-10-25 DIAGNOSIS — I50.9 ACUTE ON CHRONIC CONGESTIVE HEART FAILURE, UNSPECIFIED CONGESTIVE HEART FAILURE TYPE: ICD-10-CM

## 2017-10-25 DIAGNOSIS — R17 ELEVATED BILIRUBIN: ICD-10-CM

## 2017-10-25 DIAGNOSIS — Z86.73 HISTORY OF CVA (CEREBROVASCULAR ACCIDENT): ICD-10-CM

## 2017-10-25 DIAGNOSIS — D41.20 NEOPLASM OF UNCERTAIN BEHAVIOR OF KIDNEY AND URETER: ICD-10-CM

## 2017-10-25 DIAGNOSIS — R09.82 POST-NASAL DRIP: ICD-10-CM

## 2017-10-25 DIAGNOSIS — J20.9 ACUTE BRONCHITIS: ICD-10-CM

## 2017-10-25 DIAGNOSIS — M79.671 HEEL PAIN, BILATERAL: Chronic | ICD-10-CM

## 2017-10-25 DIAGNOSIS — I50.22 CHRONIC SYSTOLIC HEART FAILURE: Chronic | ICD-10-CM

## 2017-10-25 DIAGNOSIS — D41.00 NEOPLASM OF UNCERTAIN BEHAVIOR OF KIDNEY AND URETER: ICD-10-CM

## 2017-10-25 DIAGNOSIS — M54.9 BACK PAIN: ICD-10-CM

## 2017-10-25 DIAGNOSIS — E05.90 HYPERTHYROIDISM, SUBCLINICAL: ICD-10-CM

## 2017-10-25 DIAGNOSIS — G47.33 OSA (OBSTRUCTIVE SLEEP APNEA): ICD-10-CM

## 2017-10-25 DIAGNOSIS — R07.9 CHEST PAIN: Primary | ICD-10-CM

## 2017-10-25 DIAGNOSIS — N18.9 CHRONIC KIDNEY DISEASE: Chronic | ICD-10-CM

## 2017-10-25 DIAGNOSIS — R55 SYNCOPE, TUSSIVE: ICD-10-CM

## 2017-10-25 DIAGNOSIS — M79.672 HEEL PAIN, BILATERAL: Chronic | ICD-10-CM

## 2017-10-25 DIAGNOSIS — I42.0 DILATED CARDIOMYOPATHY: Chronic | ICD-10-CM

## 2017-10-25 DIAGNOSIS — I25.10 CORONARY ARTERY DISEASE: Chronic | ICD-10-CM

## 2017-10-25 DIAGNOSIS — Z79.01 CHRONIC ANTICOAGULATION: Chronic | ICD-10-CM

## 2017-10-25 DIAGNOSIS — R60.9 EDEMA: ICD-10-CM

## 2017-10-25 DIAGNOSIS — I10 ESSENTIAL HYPERTENSION: Chronic | ICD-10-CM

## 2017-10-25 DIAGNOSIS — R00.1 BRADYCARDIA: ICD-10-CM

## 2017-10-25 DIAGNOSIS — Z91.199 NON-COMPLIANCE: Chronic | ICD-10-CM

## 2017-10-25 DIAGNOSIS — M79.89 LEG SWELLING: ICD-10-CM

## 2017-10-25 DIAGNOSIS — Z91.148 NONCOMPLIANCE WITH DIET AND MEDICATION REGIMEN: ICD-10-CM

## 2017-10-25 DIAGNOSIS — I50.9 CHF (CONGESTIVE HEART FAILURE): ICD-10-CM

## 2017-10-25 DIAGNOSIS — E87.6 HYPOKALEMIA: ICD-10-CM

## 2017-10-25 DIAGNOSIS — Z86.79 PERSONAL HISTORY OF CEREBRAL EMBOLISM: ICD-10-CM

## 2017-10-25 DIAGNOSIS — I48.20 ATRIAL FIBRILLATION, CHRONIC: Chronic | ICD-10-CM

## 2017-10-25 DIAGNOSIS — Z86.718 PERSONAL HISTORY OF VENOUS THROMBOSIS AND EMBOLISM: ICD-10-CM

## 2017-10-25 DIAGNOSIS — R05.4 SYNCOPE, TUSSIVE: ICD-10-CM

## 2017-10-25 DIAGNOSIS — I50.23 ACUTE ON CHRONIC SYSTOLIC CHF (CONGESTIVE HEART FAILURE): ICD-10-CM

## 2017-10-25 DIAGNOSIS — I25.2 PERSONAL HISTORY OF MI (MYOCARDIAL INFARCTION): ICD-10-CM

## 2017-10-25 DIAGNOSIS — D64.9 NORMOCYTIC ANEMIA: ICD-10-CM

## 2017-10-25 DIAGNOSIS — Z91.199 NONCOMPLIANCE WITH DIET AND MEDICATION REGIMEN: ICD-10-CM

## 2017-10-25 DIAGNOSIS — R07.82 INTERCOSTAL PAIN: ICD-10-CM

## 2017-10-25 LAB
ALBUMIN SERPL BCP-MCNC: 3.2 G/DL
ALP SERPL-CCNC: 155 U/L
ALT SERPL W/O P-5'-P-CCNC: 10 U/L
ANION GAP SERPL CALC-SCNC: 9 MMOL/L
AST SERPL-CCNC: 20 U/L
BASOPHILS # BLD AUTO: 0.07 K/UL
BASOPHILS NFR BLD: 1.2 %
BILIRUB SERPL-MCNC: 2.4 MG/DL
BNP SERPL-MCNC: 441 PG/ML
BUN SERPL-MCNC: 17 MG/DL
CALCIUM SERPL-MCNC: 9.4 MG/DL
CHLORIDE SERPL-SCNC: 106 MMOL/L
CO2 SERPL-SCNC: 27 MMOL/L
CREAT SERPL-MCNC: 1.4 MG/DL
DIFFERENTIAL METHOD: ABNORMAL
EOSINOPHIL # BLD AUTO: 0.4 K/UL
EOSINOPHIL NFR BLD: 6.4 %
ERYTHROCYTE [DISTWIDTH] IN BLOOD BY AUTOMATED COUNT: 14.1 %
EST. GFR  (AFRICAN AMERICAN): >60 ML/MIN/1.73 M^2
EST. GFR  (NON AFRICAN AMERICAN): 58.2 ML/MIN/1.73 M^2
GLUCOSE SERPL-MCNC: 102 MG/DL
HCT VFR BLD AUTO: 35.9 %
HGB BLD-MCNC: 11.4 G/DL
IMM GRANULOCYTES # BLD AUTO: 0.02 K/UL
IMM GRANULOCYTES NFR BLD AUTO: 0.3 %
INR PPP: 1.7
LIPASE SERPL-CCNC: 53 U/L
LYMPHOCYTES # BLD AUTO: 1.3 K/UL
LYMPHOCYTES NFR BLD: 22.4 %
MCH RBC QN AUTO: 27.7 PG
MCHC RBC AUTO-ENTMCNC: 31.8 G/DL
MCV RBC AUTO: 87 FL
MONOCYTES # BLD AUTO: 0.9 K/UL
MONOCYTES NFR BLD: 15.3 %
NEUTROPHILS # BLD AUTO: 3.1 K/UL
NEUTROPHILS NFR BLD: 54.4 %
NRBC BLD-RTO: 0 /100 WBC
PLATELET # BLD AUTO: 224 K/UL
PMV BLD AUTO: 10.1 FL
POTASSIUM SERPL-SCNC: 3.6 MMOL/L
PROT SERPL-MCNC: 7.4 G/DL
PROTHROMBIN TIME: 17.1 SEC
RBC # BLD AUTO: 4.11 M/UL
SODIUM SERPL-SCNC: 142 MMOL/L
TROPONIN I SERPL DL<=0.01 NG/ML-MCNC: 0.06 NG/ML
WBC # BLD AUTO: 5.77 K/UL

## 2017-10-25 PROCEDURE — 85025 COMPLETE CBC W/AUTO DIFF WBC: CPT

## 2017-10-25 PROCEDURE — 99285 EMERGENCY DEPT VISIT HI MDM: CPT | Mod: ,,, | Performed by: EMERGENCY MEDICINE

## 2017-10-25 PROCEDURE — 83690 ASSAY OF LIPASE: CPT

## 2017-10-25 PROCEDURE — 85610 PROTHROMBIN TIME: CPT

## 2017-10-25 PROCEDURE — 81001 URINALYSIS AUTO W/SCOPE: CPT

## 2017-10-25 PROCEDURE — 93005 ELECTROCARDIOGRAM TRACING: CPT

## 2017-10-25 PROCEDURE — 63600175 PHARM REV CODE 636 W HCPCS: Performed by: STUDENT IN AN ORGANIZED HEALTH CARE EDUCATION/TRAINING PROGRAM

## 2017-10-25 PROCEDURE — 96375 TX/PRO/DX INJ NEW DRUG ADDON: CPT

## 2017-10-25 PROCEDURE — 80053 COMPREHEN METABOLIC PANEL: CPT

## 2017-10-25 PROCEDURE — 20600001 HC STEP DOWN PRIVATE ROOM

## 2017-10-25 PROCEDURE — 83880 ASSAY OF NATRIURETIC PEPTIDE: CPT

## 2017-10-25 PROCEDURE — 99285 EMERGENCY DEPT VISIT HI MDM: CPT | Mod: 25

## 2017-10-25 PROCEDURE — 84484 ASSAY OF TROPONIN QUANT: CPT

## 2017-10-25 PROCEDURE — 93010 ELECTROCARDIOGRAM REPORT: CPT | Mod: ,,, | Performed by: INTERNAL MEDICINE

## 2017-10-25 PROCEDURE — 96374 THER/PROPH/DIAG INJ IV PUSH: CPT

## 2017-10-25 PROCEDURE — 96376 TX/PRO/DX INJ SAME DRUG ADON: CPT

## 2017-10-25 RX ORDER — MORPHINE SULFATE 2 MG/ML
7 INJECTION, SOLUTION INTRAMUSCULAR; INTRAVENOUS
Status: COMPLETED | OUTPATIENT
Start: 2017-10-25 | End: 2017-10-25

## 2017-10-25 RX ADMIN — MORPHINE SULFATE 7 MG: 2 INJECTION, SOLUTION INTRAMUSCULAR; INTRAVENOUS at 11:10

## 2017-10-26 PROBLEM — H53.8 BLURRY VISION, BILATERAL: Status: ACTIVE | Noted: 2017-10-26

## 2017-10-26 PROBLEM — H53.2 MONOCULAR DIPLOPIA OF BOTH EYES: Status: ACTIVE | Noted: 2017-10-26

## 2017-10-26 PROBLEM — H54.7 DECREASED VISUAL ACUITY: Status: ACTIVE | Noted: 2017-10-26

## 2017-10-26 PROBLEM — H54.7 DECREASED VISUAL ACUITY: Status: RESOLVED | Noted: 2017-10-26 | Resolved: 2017-10-26

## 2017-10-26 PROBLEM — H50.10 EXOTROPIA: Status: ACTIVE | Noted: 2017-10-26

## 2017-10-26 LAB
ALBUMIN SERPL BCP-MCNC: 3.4 G/DL
ALP SERPL-CCNC: 153 U/L
ALT SERPL W/O P-5'-P-CCNC: 9 U/L
ANION GAP SERPL CALC-SCNC: 13 MMOL/L
ANION GAP SERPL CALC-SCNC: 9 MMOL/L
AST SERPL-CCNC: 20 U/L
BACTERIA #/AREA URNS AUTO: NORMAL /HPF
BASOPHILS # BLD AUTO: 0.08 K/UL
BASOPHILS NFR BLD: 1.2 %
BILIRUB SERPL-MCNC: 2.6 MG/DL
BILIRUB UR QL STRIP: NEGATIVE
BUN SERPL-MCNC: 16 MG/DL
BUN SERPL-MCNC: 18 MG/DL
CALCIUM SERPL-MCNC: 9.5 MG/DL
CALCIUM SERPL-MCNC: 9.5 MG/DL
CHLORIDE SERPL-SCNC: 102 MMOL/L
CHLORIDE SERPL-SCNC: 102 MMOL/L
CLARITY UR REFRACT.AUTO: CLEAR
CO2 SERPL-SCNC: 28 MMOL/L
CO2 SERPL-SCNC: 31 MMOL/L
COLOR UR AUTO: YELLOW
CREAT SERPL-MCNC: 1.3 MG/DL
CREAT SERPL-MCNC: 1.4 MG/DL
DIFFERENTIAL METHOD: ABNORMAL
EOSINOPHIL # BLD AUTO: 0.4 K/UL
EOSINOPHIL NFR BLD: 5.3 %
ERYTHROCYTE [DISTWIDTH] IN BLOOD BY AUTOMATED COUNT: 14.1 %
EST. GFR  (AFRICAN AMERICAN): >60 ML/MIN/1.73 M^2
EST. GFR  (AFRICAN AMERICAN): >60 ML/MIN/1.73 M^2
EST. GFR  (NON AFRICAN AMERICAN): 57.8 ML/MIN/1.73 M^2
EST. GFR  (NON AFRICAN AMERICAN): >60 ML/MIN/1.73 M^2
ESTIMATED PA SYSTOLIC PRESSURE: 43.09
GLOBAL PERICARDIAL EFFUSION: ABNORMAL
GLUCOSE SERPL-MCNC: 117 MG/DL
GLUCOSE SERPL-MCNC: 127 MG/DL
GLUCOSE UR QL STRIP: NEGATIVE
HCT VFR BLD AUTO: 37.7 %
HGB BLD-MCNC: 11.9 G/DL
HGB UR QL STRIP: ABNORMAL
IMM GRANULOCYTES # BLD AUTO: 0.01 K/UL
IMM GRANULOCYTES NFR BLD AUTO: 0.2 %
KETONES UR QL STRIP: NEGATIVE
LEUKOCYTE ESTERASE UR QL STRIP: NEGATIVE
LYMPHOCYTES # BLD AUTO: 1.3 K/UL
LYMPHOCYTES NFR BLD: 19.8 %
MAGNESIUM SERPL-MCNC: 1.4 MG/DL
MCH RBC QN AUTO: 28 PG
MCHC RBC AUTO-ENTMCNC: 31.6 G/DL
MCV RBC AUTO: 89 FL
MICROSCOPIC COMMENT: NORMAL
MITRAL VALVE REGURGITATION: ABNORMAL
MONOCYTES # BLD AUTO: 1.2 K/UL
MONOCYTES NFR BLD: 17.5 %
NEUTROPHILS # BLD AUTO: 3.7 K/UL
NEUTROPHILS NFR BLD: 56 %
NITRITE UR QL STRIP: NEGATIVE
NRBC BLD-RTO: 0 /100 WBC
PH UR STRIP: 5 [PH] (ref 5–8)
PHOSPHATE SERPL-MCNC: 3.6 MG/DL
PLATELET # BLD AUTO: 212 K/UL
PMV BLD AUTO: 10.1 FL
POTASSIUM SERPL-SCNC: 3.7 MMOL/L
POTASSIUM SERPL-SCNC: 3.8 MMOL/L
PROT SERPL-MCNC: 7.8 G/DL
PROT UR QL STRIP: NEGATIVE
RBC # BLD AUTO: 4.25 M/UL
RBC #/AREA URNS AUTO: 0 /HPF (ref 0–4)
RETIRED EF AND QEF - SEE NOTES: 33 (ref 55–65)
SODIUM SERPL-SCNC: 142 MMOL/L
SODIUM SERPL-SCNC: 143 MMOL/L
SP GR UR STRIP: 1 (ref 1–1.03)
TRICUSPID VALVE REGURGITATION: ABNORMAL
TROPONIN I SERPL DL<=0.01 NG/ML-MCNC: 0.04 NG/ML
URN SPEC COLLECT METH UR: ABNORMAL
UROBILINOGEN UR STRIP-ACNC: NEGATIVE EU/DL
WBC # BLD AUTO: 6.57 K/UL
WBC #/AREA URNS AUTO: 5 /HPF (ref 0–5)

## 2017-10-26 PROCEDURE — 84484 ASSAY OF TROPONIN QUANT: CPT

## 2017-10-26 PROCEDURE — 83735 ASSAY OF MAGNESIUM: CPT

## 2017-10-26 PROCEDURE — 63600175 PHARM REV CODE 636 W HCPCS: Performed by: STUDENT IN AN ORGANIZED HEALTH CARE EDUCATION/TRAINING PROGRAM

## 2017-10-26 PROCEDURE — 25000003 PHARM REV CODE 250: Performed by: STUDENT IN AN ORGANIZED HEALTH CARE EDUCATION/TRAINING PROGRAM

## 2017-10-26 PROCEDURE — 63600175 PHARM REV CODE 636 W HCPCS: Performed by: EMERGENCY MEDICINE

## 2017-10-26 PROCEDURE — 93306 TTE W/DOPPLER COMPLETE: CPT | Mod: 26,,, | Performed by: INTERNAL MEDICINE

## 2017-10-26 PROCEDURE — C8929 TTE W OR WO FOL WCON,DOPPLER: HCPCS

## 2017-10-26 PROCEDURE — 80053 COMPREHEN METABOLIC PANEL: CPT

## 2017-10-26 PROCEDURE — 80048 BASIC METABOLIC PNL TOTAL CA: CPT

## 2017-10-26 PROCEDURE — 36415 COLL VENOUS BLD VENIPUNCTURE: CPT

## 2017-10-26 PROCEDURE — 84100 ASSAY OF PHOSPHORUS: CPT

## 2017-10-26 PROCEDURE — 85025 COMPLETE CBC W/AUTO DIFF WBC: CPT

## 2017-10-26 PROCEDURE — 99223 1ST HOSP IP/OBS HIGH 75: CPT | Mod: ,,, | Performed by: HOSPITALIST

## 2017-10-26 PROCEDURE — 93005 ELECTROCARDIOGRAM TRACING: CPT

## 2017-10-26 PROCEDURE — 20600001 HC STEP DOWN PRIVATE ROOM

## 2017-10-26 PROCEDURE — 93010 ELECTROCARDIOGRAM REPORT: CPT | Mod: ,,, | Performed by: INTERNAL MEDICINE

## 2017-10-26 RX ORDER — ENOXAPARIN SODIUM 100 MG/ML
40 INJECTION SUBCUTANEOUS EVERY 24 HOURS
Status: DISCONTINUED | OUTPATIENT
Start: 2017-10-26 | End: 2017-10-26

## 2017-10-26 RX ORDER — ONDANSETRON 2 MG/ML
4 INJECTION INTRAMUSCULAR; INTRAVENOUS
Status: COMPLETED | OUTPATIENT
Start: 2017-10-26 | End: 2017-10-26

## 2017-10-26 RX ORDER — HYDRALAZINE HYDROCHLORIDE 25 MG/1
25 TABLET, FILM COATED ORAL EVERY 8 HOURS
Status: DISCONTINUED | OUTPATIENT
Start: 2017-10-26 | End: 2017-10-28 | Stop reason: HOSPADM

## 2017-10-26 RX ORDER — ONDANSETRON 2 MG/ML
INJECTION INTRAMUSCULAR; INTRAVENOUS
Status: DISPENSED
Start: 2017-10-26 | End: 2017-10-26

## 2017-10-26 RX ORDER — NAPROXEN SODIUM 220 MG/1
81 TABLET, FILM COATED ORAL DAILY
Status: DISCONTINUED | OUTPATIENT
Start: 2017-10-26 | End: 2017-10-28 | Stop reason: HOSPADM

## 2017-10-26 RX ORDER — IBUPROFEN 400 MG/1
400 TABLET ORAL EVERY 6 HOURS PRN
Status: DISCONTINUED | OUTPATIENT
Start: 2017-10-26 | End: 2017-10-27

## 2017-10-26 RX ORDER — FUROSEMIDE 10 MG/ML
40 INJECTION INTRAMUSCULAR; INTRAVENOUS 3 TIMES DAILY
Status: DISCONTINUED | OUTPATIENT
Start: 2017-10-26 | End: 2017-10-27

## 2017-10-26 RX ORDER — LANOLIN ALCOHOL/MO/W.PET/CERES
400 CREAM (GRAM) TOPICAL ONCE
Status: COMPLETED | OUTPATIENT
Start: 2017-10-26 | End: 2017-10-26

## 2017-10-26 RX ORDER — ONDANSETRON 2 MG/ML
8 INJECTION INTRAMUSCULAR; INTRAVENOUS
Status: DISCONTINUED | OUTPATIENT
Start: 2017-10-26 | End: 2017-10-26

## 2017-10-26 RX ORDER — FUROSEMIDE 10 MG/ML
40 INJECTION INTRAMUSCULAR; INTRAVENOUS
Status: COMPLETED | OUTPATIENT
Start: 2017-10-26 | End: 2017-10-26

## 2017-10-26 RX ORDER — ISOSORBIDE DINITRATE 20 MG/1
20 TABLET ORAL 3 TIMES DAILY
Status: DISCONTINUED | OUTPATIENT
Start: 2017-10-26 | End: 2017-10-28 | Stop reason: HOSPADM

## 2017-10-26 RX ORDER — LANOLIN ALCOHOL/MO/W.PET/CERES
400 CREAM (GRAM) TOPICAL ONCE
Status: DISCONTINUED | OUTPATIENT
Start: 2017-10-26 | End: 2017-10-28 | Stop reason: HOSPADM

## 2017-10-26 RX ORDER — CARVEDILOL 12.5 MG/1
12.5 TABLET ORAL 2 TIMES DAILY
Status: DISCONTINUED | OUTPATIENT
Start: 2017-10-26 | End: 2017-10-26

## 2017-10-26 RX ADMIN — LOSARTAN POTASSIUM 12.5 MG: 25 TABLET, FILM COATED ORAL at 10:10

## 2017-10-26 RX ADMIN — MAGNESIUM OXIDE TAB 400 MG (241.3 MG ELEMENTAL MG) 400 MG: 400 (241.3 MG) TAB at 02:10

## 2017-10-26 RX ADMIN — ISOSORBIDE DINITRATE 20 MG: 20 TABLET ORAL at 09:10

## 2017-10-26 RX ADMIN — IBUPROFEN 400 MG: 400 TABLET, FILM COATED ORAL at 09:10

## 2017-10-26 RX ADMIN — HYDRALAZINE HYDROCHLORIDE 25 MG: 25 TABLET, FILM COATED ORAL at 09:10

## 2017-10-26 RX ADMIN — FUROSEMIDE 10 MG/HR: 10 INJECTION, SOLUTION INTRAMUSCULAR; INTRAVENOUS at 08:10

## 2017-10-26 RX ADMIN — ONDANSETRON 4 MG: 2 INJECTION, SOLUTION INTRAMUSCULAR; INTRAVENOUS at 04:10

## 2017-10-26 RX ADMIN — FUROSEMIDE 40 MG: 10 INJECTION, SOLUTION INTRAVENOUS at 01:10

## 2017-10-26 RX ADMIN — WARFARIN SODIUM 12 MG: 5 TABLET ORAL at 07:10

## 2017-10-26 RX ADMIN — HYDRALAZINE HYDROCHLORIDE 25 MG: 25 TABLET, FILM COATED ORAL at 02:10

## 2017-10-26 RX ADMIN — ONDANSETRON 4 MG: 2 INJECTION, SOLUTION INTRAMUSCULAR; INTRAVENOUS at 02:10

## 2017-10-26 RX ADMIN — ISOSORBIDE DINITRATE 20 MG: 20 TABLET ORAL at 02:10

## 2017-10-26 RX ADMIN — ONDANSETRON 4 MG: 2 INJECTION, SOLUTION INTRAMUSCULAR; INTRAVENOUS at 01:10

## 2017-10-26 RX ADMIN — IBUPROFEN 400 MG: 400 TABLET, FILM COATED ORAL at 02:10

## 2017-10-26 RX ADMIN — MAGNESIUM SULFATE HEPTAHYDRATE 3 G: 500 INJECTION, SOLUTION INTRAMUSCULAR; INTRAVENOUS at 06:10

## 2017-10-26 RX ADMIN — ASPIRIN 81 MG CHEWABLE TABLET 81 MG: 81 TABLET CHEWABLE at 09:10

## 2017-10-26 RX ADMIN — FUROSEMIDE 40 MG: 10 INJECTION, SOLUTION INTRAVENOUS at 09:10

## 2017-10-26 NOTE — ED NOTES
Dr. Kamara and Dr. Rosario notified of patient c/o blurry vision and double vision. Dr. Rosario states he will go see patient.

## 2017-10-26 NOTE — PLAN OF CARE
10/26/17 1556   Discharge Assessment   Assessment Type Discharge Planning Assessment   Confirmed/corrected address and phone number on facesheet? Yes   Assessment information obtained from? Patient;Medical Record   Expected Length of Stay (days) 3   Communicated expected length of stay with patient/caregiver yes   Prior to hospitilization cognitive status: Alert/Oriented   Prior to hospitalization functional status: Independent   Current cognitive status: Alert/Oriented   Current Functional Status: Independent   Lives With sibling(s)  (lives with his brother)   Able to Return to Prior Arrangements yes   Is patient able to care for self after discharge? Yes   Who are your caregiver(s) and their phone number(s)? self care   Patient's perception of discharge disposition home or selfcare   Readmission Within The Last 30 Days no previous admission in last 30 days   Patient currently being followed by outpatient case management? No   Patient currently receives any other outside agency services? No   Equipment Currently Used at Home none   Do you have any problems affording any of your prescribed medications? No   Is the patient taking medications as prescribed? yes   Does the patient have transportation home? Yes  (car is in the ED parking lot)   Does the patient receive services at the [facility]? No   Discharge Plan A Home with family   Patient/Family In Agreement With Plan yes

## 2017-10-26 NOTE — HPI
"Hamlet Terrell is a 51 y.o. male with Hx of HFrEF(EF 25%), HTN, CAD, CVA/cerebral embolism, A fib,  PE, CKD, TAPAN who [facility] sternal chest pain. Pt states that pain started Tuesday, subsided, then came back around 0600 Wednesday. The chest pain is 10/10, stabbing "like an ice pick", sternal, constant, reproducible, without alleviating/exacerbating factors. Pt states that he has been experiencing blurry vision and double vision for the past couple of days as well. Pt vomited ~500cc of non bloody non bilious emesis in the ED. Pt endorses SOB and bilateral lower extremity swelling.    In the ED, he received Zofran 4mg x2, Lasix 40mg, Morphine 7mg.        Troponin 0.062 (below baseline)    INR 1.7    UA: 2+ occult blood    CXR: cardiomegaly and Right pleural effusion    US abdomen: hepatic cirrhosis    CT head: no acute abnormalities    EKG: atrial fibrillation  "

## 2017-10-26 NOTE — PROVIDER PROGRESS NOTES - EMERGENCY DEPT.
Encounter Date: 10/25/2017    ED Physician Progress Notes         EKG - STEMI Decision  Initial Reading: No STEMI present.  Response: No Action Needed.

## 2017-10-26 NOTE — ED NOTES
Dr. Rosario and Dr. Kamara reminded about pt c/o blurry and double vision. Also informed that pt is now vomiting with blood streaks in emesis. Orders to be placed for zofran and head CT.

## 2017-10-26 NOTE — ED NOTES
Medicine team states no need for second troponin because pt level is actually lower than baseline level

## 2017-10-26 NOTE — SUBJECTIVE & OBJECTIVE
Past Medical History:   Diagnosis Date    Anticoagulant long-term use     Cardiomegaly     CHF (congestive heart failure)     Chronic combined systolic and diastolic congestive heart failure     Coronary artery disease     CVA (cerebrovascular accident)     Heart attack 2006    Hypertension     Hyperthyroidism, subclinical 1/2/2013    Paroxysmal atrial fibrillation     S/P ablation of atrial flutter 2008    Stroke 2009    no residual weaknesses       Past Surgical History:   Procedure Laterality Date    RADIOFREQUENCY ABLATION  01/08/2008    for atrial flutter       Review of patient's allergies indicates:   Allergen Reactions    Acetaminophen      Itching    Oxycodone-acetaminophen      Other reaction(s): Itching    Ace inhibitors Other (See Comments)     cough       No current facility-administered medications on file prior to encounter.      Current Outpatient Prescriptions on File Prior to Encounter   Medication Sig    aspirin 81 MG Chew Take 1 tablet (81 mg total) by mouth once daily.    carvedilol (COREG) 25 MG tablet Take 0.5 tablets (12.5 mg total) by mouth 2 (two) times daily.    gabapentin (NEURONTIN) 400 MG capsule Take 1 capsule (400 mg total) by mouth 2 (two) times daily. For chronic heel/foot pain    hydrALAZINE (APRESOLINE) 25 MG tablet Take 1 tablet (25 mg total) by mouth every 8 (eight) hours.    isosorbide dinitrate (ISORDIL) 20 MG tablet Take 1 tablet (20 mg total) by mouth 3 (three) times daily.    nitroGLYCERIN (NITROSTAT) 0.4 MG SL tablet Place 1 tablet (0.4 mg total) under the tongue every 5 (five) minutes as needed for Chest pain. Tablet, Sublingual Sublingual    potassium chloride SA (K-DUR,KLOR-CON) 20 MEQ tablet Take by mouth once daily. Patient reports taking 25 mg daily    torsemide (DEMADEX) 20 MG Tab Take 2 tablets (40 mg total) by mouth 2 (two) times daily. (Patient taking differently: Take 20 mg by mouth 2 (two) times daily. )    hydrocodone-acetaminophen  5-325mg (NORCO) 5-325 mg per tablet Take 1 tablet by mouth every 4 (four) hours as needed for Pain.    losartan (COZAAR) 25 MG tablet Take 0.5 tablets (12.5 mg total) by mouth once daily.    oxycodone (OXY-IR) 5 mg Cap Take 1 capsule (5 mg total) by mouth every 4 (four) hours as needed for Pain.    tramadol (ULTRAM) 50 mg tablet Take 1-2 tablets ( mg total) by mouth every 6 (six) hours as needed for Pain.    warfarin (COUMADIN) 7.5 MG tablet Take 1.5 tablets (11.25 mg total) by mouth Daily. (Patient taking differently: Take 12 mg by mouth Daily. Total 12 mg daily)     Family History     Problem Relation (Age of Onset)    Alcohol abuse Father    Hypertension Mother, Father, Sister, Brother    Stroke Mother        Social History Main Topics    Smoking status: Never Smoker    Smokeless tobacco: Never Used    Alcohol use No    Drug use: No    Sexual activity: No     Review of Systems   Constitutional: Negative for chills and fever.   HENT: Negative for congestion and sore throat.    Eyes: Positive for visual disturbance (blurry vision, double vision).   Respiratory: Positive for shortness of breath. Negative for cough and wheezing.    Cardiovascular: Positive for chest pain (10/10, stabbing, sternal, non-radiating) and leg swelling. Negative for palpitations.   Gastrointestinal: Positive for abdominal distention, nausea and vomiting. Negative for constipation and diarrhea.   Endocrine: Negative for polydipsia and polyuria.   Genitourinary: Negative for dysuria and hematuria.   Musculoskeletal: Positive for myalgias. Negative for arthralgias.   Skin: Negative for rash and wound.   Neurological: Positive for dizziness. Negative for headaches.   Psychiatric/Behavioral: Negative for behavioral problems and confusion.     Objective:     Vital Signs (Most Recent):  Temp: 98.1 °F (36.7 °C) (10/25/17 2223)  Pulse: 68 (10/26/17 0447)  Resp: 16 (10/26/17 0155)  BP: (!) 143/84 (10/26/17 0131)  SpO2: 95 % (10/26/17  0447) Vital Signs (24h Range):  Temp:  [98.1 °F (36.7 °C)] 98.1 °F (36.7 °C)  Pulse:  [68-89] 68  Resp:  [16-18] 16  SpO2:  [95 %-98 %] 95 %  BP: (143-166)/(78-98) 143/84     Weight: 117.9 kg (260 lb)  Body mass index is 38.4 kg/m².    Physical Exam   Constitutional: He is oriented to person, place, and time. He appears well-developed and well-nourished.   HENT:   Head: Normocephalic and atraumatic.   Eyes: Pupils are equal, round, and reactive to light.   Cardiovascular: Normal rate, regular rhythm and normal heart sounds.    No murmur heard.  Bilateral pitting 3+ edema past knees   Pulmonary/Chest: Effort normal and breath sounds normal. No respiratory distress. He has no wheezes. He has no rales.   Abdominal: Bowel sounds are normal. He exhibits distension. There is tenderness.   Musculoskeletal: Normal range of motion.   Neurological: He is alert and oriented to person, place, and time.   Skin: Skin is warm and dry.   Psychiatric: He has a normal mood and affect. His behavior is normal.        Significant Labs:   CBC:   Recent Labs  Lab 10/25/17  2305   WBC 5.77   RBC 4.11*   HGB 11.4*   HCT 35.9*      MCV 87   MCH 27.7   MCHC 31.8*     BMP:   Recent Labs  Lab 10/25/17  2305         K 3.6      CO2 27   BUN 17   CREATININE 1.4   CALCIUM 9.4     Coagulation:   Recent Labs  Lab 10/25/17  2305   INR 1.7*     Microbiology Results (last 7 days)     ** No results found for the last 168 hours. **            Significant Imaging:     US abdomen: hepatic cirrhosis    CT head: no acute abnormalities    EKG: atrial fibrillation

## 2017-10-26 NOTE — ASSESSMENT & PLAN NOTE
- non-anginal chest pain; likely costochondritis   - 10/10, stabbing, sternal, non-radiating, reproducible  - Troponin 0.062, which is below his baseline  -   - EKG: atrial fibrillation  - CXR: cardiomegaly, Right pleural effusion  - morphine 7mg in ED

## 2017-10-26 NOTE — ASSESSMENT & PLAN NOTE
- 2d echo 4/17: EF 25%, diastolic dysfunction  - CXR: cardiomegaly, Right pleural effusion  - lungs clear on examination  - bilateral 3+ pitting edema past knees  - lasix 40mg in ED  - continue diuresis  - repeat 2d echo

## 2017-10-26 NOTE — ASSESSMENT & PLAN NOTE
"- Chronic - previously noted in May 2009 - see media tab for notes  - In central gaze, has mild right exotropia.  Cross cover test with exotropia as well  - Pt does not endorse history of strabismus or amblyopia  - See "Monocular Diplopia" for plan  "

## 2017-10-26 NOTE — PROGRESS NOTES
Patient Consulted by CTTS:     The following was discussed by the Tobacco Treatment Specialist:  ? Relevance of Quitting  ? Risk to Health  ? Long Term Risk  ? Risk for Others  ? Rewards of Quitting  ? Motivation Intervention to Quit          Pt has never smoked. No education given.

## 2017-10-26 NOTE — CONSULTS
[facility]  Ophthalmology  Consult Note    Patient Name: Hamlet Terrell  MRN: [id number]  Admission Date: 10/25/2017  Hospital Length of Stay: 0 days  Attending Provider: Jason Sow MD   Primary Care Physician: Carlin Swift MD  Principal Problem:Intercostal pain    Inpatient consult to Ophthalmology  Consult performed by: FRANCISCA CUEVAS  Consult ordered by: FELIPA MAYFIELD        Subjective:     Chief Complaint:  Diplopia, blurry vision    HPI:   Pt states 2 days ago in the afternoon noted chest pain, sharp, substernal, resolved spontaneously, and then recurred yesterday, 10/10, tried nitroglycerin x 3, no improvement, no help.  Shortness of breath slightly worsened, swelling in the leg worsened from baseline.  Blurred vision started 2 days ago with chest pain but has persisted, hasn't changed since, both eyes blurry.  Diplopia started yesterday with the recurrent chest pain.  Never had blurry vision or diplopia before.  No flashes, floaters, curtain/veil.  No eye pain at rest or with movements.  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  No numbness, weakness, tingling.  No headache, runny nose, sore throat.  Has had nausea and vomiting, this has resolved with medication.  Chest pain, shortness of breath, and leg swelling not improved nor has visual symptoms improved since admission per pt, however record review indicated pitting edema decreased with diuresis, chest pain thought to be from costochondritis.  No contacts or glasses.  Never has had dilated eye exam.  No family history of ocular disease of glaucoma, macular degeneration, early blindness.     No new subjective & objective note has been filed under this hospital service since the last note was generated.      Base Eye Exam     Visual Acuity (Snellen - Linear)       Right Left    Dist sc HM at 5' HM at 5'    Dist ph sc 20/200 20/100 -1    Monocular diplopia at all times even with pinhole          Tonometry (Applanation, 3:46  PM)       Right Left    Pressure 16 15          Pupils       Pupils Dark Light Shape React APD    Right PERRL 3.5 2.5 Round Sluggish None    Left PERRL 3.5 2.5 Round Sluggish None          Visual Fields       Right Left    Restrictions Partial outer superior temporal, inferior temporal, superior nasal, inferior nasal deficiencies Partial outer superior temporal, inferior temporal, superior nasal, inferior nasal deficiencies          Extraocular Movement       Right Left     Full, Ortho Full, Ortho          Neuro/Psych     Oriented x3:  Yes    Mood/Affect:  Normal          Dilation     Both eyes:  1.0% Mydriacyl, 2.5% phenylephrine @ 3:45 PM            Slit Lamp and Fundus Exam     External Exam       Right Left    External Normal Normal          Slit Lamp Exam       Right Left    Lids/Lashes Meibomian gland dysfunction Meibomian gland dysfunction    Conjunctiva/Sclera Racial melanosis, trace injection Racial melanosis, trace injection    Cornea Clear, debris in tear film, arcus Clear, debris in tear film, arcus    Anterior Chamber Deep and quiet Deep and quiet    Iris Round and reactive Round and reactive    Lens Nuclear sclerosis Nuclear sclerosis    Vitreous Vitreous syneresis Vitreous syneresis          Fundus Exam       Right Left    Disc tilted  tilted    C/D Ratio 0.75 0.75    Macula Normal Normal    Vessels Tortuous Tortuous    Periphery 1DD of RPE changes superior to ST arcades 1DD of RPE changes inferonasally                                                Assessment and Plan:     Monocular diplopia of both eyes    - Chronic - previously noted in May 2009 - see media tab for notes  - Comorbidities include cirrhosis of unknown etiology, acute on chronic diastolic and systolic heart failure, and atypical chest pain  - Pt complains of new onset bilateral monocular horizontal diplopia, decrease in visual acuity, chest pain, shortness of breath, leg swelling  - Pt states symptoms are acute and has never seen  "ophthalmology before, however record review indicates ophthalmology saw him in 2009 and pt had exact same symptoms  - Chronic and pt has documented myopia with astigmatism which could account for diplopia  - CT head without contrast does indicate prior left ALEJANDRA stroke  - In clinic - obtained:   - OCT retinal nerve fiber layer - inferotemporal nerve fiber layer thinning   - OCT macula - normal except small PED vs subretinal fluid   - Fundus photos - when compared to May 2009, no changes  - Pt requires outpatient refraction and not further workup at this time (no further intervention, imaging, labs, etc.) from an ophthalmology perspective  - Pt would like to go to [place] [facility] - if he changes his mind, he was informed he could see us  - If changes in signs/symptoms, please re-consult ophthalmology  - Page on call ophthalmology resident with any questions    Pt discussed and seen with attending, Dr. Nichole        Exotropia    - Chronic - previously noted in May 2009 - see media tab for notes  - In central gaze, has mild right exotropia.  Cross cover test with exotropia as well  - Pt does not endorse history of strabismus or amblyopia  - See "Monocular Diplopia" for plan        Blurry vision, bilateral    - 1+ nuclear sclerosis cataract not likely etiology of decreased vision  - Pt has previous documented myopia with astigmatism, never followed up for glasses  - See "monocular diplopia" for plan            Thank you for your consult. I will sign off. Please contact us if you have any additional questions.    lEkin Aparicio MD  Ophthalmology  [facility]    "

## 2017-10-26 NOTE — ASSESSMENT & PLAN NOTE
"- 1+ nuclear sclerosis cataract not likely etiology of decreased vision  - Pt has previous documented myopia with astigmatism, never followed up for glasses  - See "monocular diplopia" for plan  "

## 2017-10-26 NOTE — NURSING
Called report to Al[facility] ORTIZ, Notified pt refusing 0600 meds as well as Lasix gtt as ordered per MD. V/S at present Temp 98.6, Pulse-63, B/P 133/83, MAP-103, O2-96% on 1L NC. Pt transported to room 707 via WC. Belongings returned to pt.

## 2017-10-26 NOTE — ASSESSMENT & PLAN NOTE
- Chronic - previously noted in May 2009 - see media tab for notes  - Comorbidities include cirrhosis of unknown etiology, acute on chronic diastolic and systolic heart failure, and atypical chest pain  - Pt complains of new onset bilateral monocular horizontal diplopia, decrease in visual acuity, chest pain, shortness of breath, leg swelling  - Pt states symptoms are acute and has never seen ophthalmology before, however record review indicates ophthalmology saw him in 2009 and pt had exact same symptoms  - Chronic and pt has documented myopia with astigmatism which could account for diplopia  - CT head without contrast does indicate prior left ALEJANDRA stroke  - In clinic - obtained:   - OCT retinal nerve fiber layer - inferotemporal nerve fiber layer thinning   - OCT macula - normal except small PED vs subretinal fluid   - Fundus photos - when compared to May 2009, no changes  - Pt requires outpatient refraction and not further workup at this time (no further intervention, imaging, labs, etc.) from an ophthalmology perspective  - Pt would like to go to [facility] - if he changes his mind, he was informed he could see us  - If changes in signs/symptoms, please re-consult ophthalmology  - Page on call ophthalmology resident with any questions    Pt discussed and seen with attending, Dr. Nichole

## 2017-10-26 NOTE — PROGRESS NOTES
Consent obtained. optison given ivp via right a/c sl. Denies transfusion rxn. Tolerated well. Sl flushed before and after with 10 cc ns.

## 2017-10-26 NOTE — ED NOTES
Pt identifiers Hamlet MAYA Blovinchecked and correct  LOC: The patient is awake, alert, aware of environment with an appropriate affect. Oriented x4, speaking appropriately  APPEARANCE: Pt is tearful, and appears to be very SOB, pt is clean and well groomed, clothing properly fastened  SKIN: Skin warm, dry and intact, normal skin turgor, moist mucus membranes  RESPIRATORY: Airway is open and patent, respirations are spontaneous, SOB, labored breathing noted.   CARDIAC: Normal rate, a-fib on monitor, 4+ pitting peripheral ryann LE edema noted painful to touch, capillary refill < 3 seconds, bilateral radial pulses 2+  ABDOMEN: Soft, nontender,. Abdominal swelling noted, painful to touch  NEUROLOGIC: facial expression is symmetrical, patient moving all extremities spontaneously, normal sensation in all extremities when touched with a finger.  Follows all commands appropriately  MUSCULOSKELETAL: No obvious deformities.

## 2017-10-26 NOTE — ED TRIAGE NOTES
Pt c/o SOB, dizziness, double vision, and chest pain since yesterday. Pt has hx of CHF and a-fib and cardiomegaly. Pt is currently in a-fib at regular rate on monitor. Pt reports taking 3 nitro at home.

## 2017-10-26 NOTE — ED PROVIDER NOTES
Encounter Date: 10/25/2017       History     Chief Complaint   Patient presents with    Shortness of Breath     Short of breath, edema, chest pain. Heart Failure patient     53yo M with h/o CHF, HTN, stroke with no residual deficit presents with chest pain that started several hours pta. Chest pain is acute in onset, retrosternal, sharp, progressive, not relieved with patient's nitroglycerin. Denies nausea, vomiting, radiation of pain, diaphoresis. Also endorses chronic SOB at baseline, is able to walk only a few steps before becoming symptomatic. Chronic cough also at baseline. Denies wheezing. Does endorse lower extremity swelling up to hips, worsening since his torsemide was cut back two months ago, and abdominal distension with pain over the same time period. Complains of blurry vision with seeing double bilaterally that began today. Denies fever, chills, HA, abd pain, nausea, vomiting, urinary symptoms.           Review of patient's allergies indicates:   Allergen Reactions    Acetaminophen      Itching    Oxycodone-acetaminophen      Other reaction(s): Itching    Ace inhibitors Other (See Comments)     cough     Past Medical History:   Diagnosis Date    Anticoagulant long-term use     Cardiomegaly     CHF (congestive heart failure)     Chronic combined systolic and diastolic congestive heart failure     Coronary artery disease     CVA (cerebrovascular accident)     Heart attack 2006    Hypertension     Hyperthyroidism, subclinical 1/2/2013    Paroxysmal atrial fibrillation     S/P ablation of atrial flutter 2008    Stroke 2009    no residual weaknesses     Past Surgical History:   Procedure Laterality Date    RADIOFREQUENCY ABLATION  01/08/2008    for atrial flutter     Family History   Problem Relation Age of Onset    Hypertension Mother     Stroke Mother     Hypertension Father     Alcohol abuse Father     Hypertension Sister     Hypertension Brother      Social History   Substance Use  Topics    Smoking status: Never Smoker    Smokeless tobacco: Never Used    Alcohol use No     Review of Systems   Constitutional: Negative for chills and fever.   HENT: Negative for congestion and sore throat.    Eyes: Positive for visual disturbance.   Respiratory: Positive for shortness of breath. Negative for cough and wheezing.    Cardiovascular: Positive for chest pain and leg swelling.   Gastrointestinal: Positive for abdominal distention and abdominal pain. Negative for nausea and vomiting.   Genitourinary: Negative for dysuria and hematuria.   Musculoskeletal: Negative for back pain and gait problem.   Skin: Negative for pallor and rash.   Neurological: Negative for syncope and headaches.   Psychiatric/Behavioral: Negative for behavioral problems and confusion.       Physical Exam     Initial Vitals [10/25/17 2223]   BP Pulse Resp Temp SpO2   (!) 166/98 72 18 98.1 °F (36.7 °C) 95 %      MAP       120.67         Physical Exam    Constitutional: He appears well-developed and well-nourished. He is not diaphoretic. He appears distressed.   HENT:   Head: Normocephalic and atraumatic.   Mouth/Throat: Oropharynx is clear and moist. No oropharyngeal exudate.   Eyes: Conjunctivae and EOM are normal. Pupils are equal, round, and reactive to light. No scleral icterus.   Neck: Normal range of motion. No tracheal deviation present.   Cardiovascular: Normal rate, regular rhythm, normal heart sounds and intact distal pulses. Exam reveals no gallop and no friction rub.    No murmur heard.  Pulmonary/Chest: He is in respiratory distress. He has wheezes. He has no rhonchi. He has no rales. He exhibits no tenderness.   Initially mildly increased work of breathing that increased severely when patient held himself upright for lung auscultation. Mild end expiratory wheezes diffusely   Abdominal: He exhibits distension. There is tenderness. There is no rebound and no guarding.   Diffusely and severely tender to light palpation    Musculoskeletal: He exhibits edema and tenderness.   3+ pitting edema to shins, edema extending up to mid thighs   Neurological: He is alert and oriented to person, place, and time.   Moving all extremities   Skin: Skin is warm and dry. No rash noted. No erythema.   Psychiatric: He has a normal mood and affect. His behavior is normal. Thought content normal.         ED Course   Procedures  Labs Reviewed   CBC W/ AUTO DIFFERENTIAL   COMPREHENSIVE METABOLIC PANEL   TROPONIN I   B-TYPE NATRIURETIC PEPTIDE   PROTIME-INR   LIPASE   URINALYSIS             Medical Decision Making:   Differential Diagnosis:   CHF exacerbation, ACS, COPD, PE  ED Management:  Workup - cbc, cmp, trop, bnp, ua, lipase, cxr, ekg  Treatment - morphine for pain    Patient became nauseous, produced small volume emesis of slightly bloody sputum. Given zofran. Head CT ordered for concern of intracranial process. Diuresis started with lasix. Bili noted to be elevated. RUQ ultrasound ordered for further evaluation.   2:11 AM    WBC 5.8, Hg 11.4, no major electrolyte disturbances, troponin 0.062 below patient's baseline, bnp 441 above baseline of 150 - 300, lipase normal, ua unremarkable.  Head Ct - No acute intracranial abnormalities. Stable remote left ALEJANDRA distribution parasagittal left frontal infarct  RUQ u/s - Hepatomegaly and cirrhosis. No other significant sonographic abnormality    Patient reports mild improvement in SOB with diuresis. Pain improved some with morphine.  3:10 AM    Consulted to internal medicine for admission for further diuresis and acs rule out.  THERESA Kamara MD - PGY1  3:52 AM                       ED Course      Clinical Impression:   The primary encounter diagnosis was Chest pain. Diagnoses of Elevated bilirubin and Acute on chronic congestive heart failure, unspecified congestive heart failure type were also pertinent to this visit.                           Ivan Kamara MD  Resident  10/26/17 2451

## 2017-10-26 NOTE — HPI
Pt states 2 days ago in the afternoon noted chest pain, sharp, substernal, resolved spontaneously, and then recurred yesterday, 10/10, tried nitroglycerin x 3, no improvement, no help.  Shortness of breath slightly worsened, swelling in the leg worsened from baseline.  Blurred vision started 2 days ago with chest pain but has persisted, hasn't changed since, both eyes blurry.  Diplopia started yesterday with the recurrent chest pain.  Never had blurry vision or diplopia before.  No flashes, floaters, curtain/veil.  No eye pain at rest or with movements.  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  No numbness, weakness, tingling.  No headache, runny nose, sore throat.  Has had nausea and vomiting, this has resolved with medication.  Chest pain, shortness of breath, and leg swelling not improved nor has visual symptoms improved since admission per pt, however record review indicated pitting edema decreased with diuresis, chest pain thought to be from costochondritis.  No contacts or glasses.  Never has had dilated eye exam.  No family history of ocular disease of glaucoma, macular degeneration, early blindness.

## 2017-10-26 NOTE — H&P
"[facility] Hospital Medicine  History & Physical    Patient Name: Hamlet Terrell  MRN: [id number]  Admission Date: 10/25/2017  Attending Physician: Jason Sow MD   Primary Care Provider: Carlin Swift MD    [facility] Medicine Team: [facility] 3 Cristian Sharp MD     Patient information was obtained from patient, past medical records and ER records.     Subjective:     Principal Problem:Chest pain    Chief Complaint:   Chief Complaint   Patient presents with    Shortness of Breath     Short of breath, edema, chest pain. Heart Failure patient        HPI: Hamlet Terrell is a 51 y.o. male with Hx of HFrEF(EF 25%), HTN, CAD, CVA/cerebral embolism, A fib,  PE, CKD, TAPAN who [place] [facility] [place] complaining of non-anginal chest pain. Pt states that pain started Tuesday, subsided, then came back around 0600 Wednesday. The chest pain is 10/10, stabbing "like an ice pick", sternal, constant, reproducible, without alleviating/exacerbating factors. Pt states that he has been experiencing blurry vision and double vision for the past couple of days as well. Pt vomited ~500cc of non bloody non bilious emesis in the ED. Pt endorses SOB and bilateral lower extremity swelling.    In the ED, he received Zofran 4mg x2, Lasix 40mg, Morphine 7mg.        Troponin 0.062 (below baseline)    INR 1.7    UA: 2+ occult blood    CXR: cardiomegaly and Right pleural effusion    US abdomen: hepatic cirrhosis    CT head: no acute abnormalities    EKG: atrial fibrillation    Past Medical History:   Diagnosis Date    Anticoagulant long-term use     Cardiomegaly     CHF (congestive heart failure)     Chronic combined systolic and diastolic congestive heart failure     Coronary artery disease     CVA (cerebrovascular accident)     Heart attack 2006    Hypertension     Hyperthyroidism, subclinical 1/2/2013    Paroxysmal atrial fibrillation     S/P ablation of atrial flutter 2008    Stroke 2009    no residual " weaknesses       Past Surgical History:   Procedure Laterality Date    RADIOFREQUENCY ABLATION  01/08/2008    for atrial flutter       Review of patient's allergies indicates:   Allergen Reactions    Acetaminophen      Itching    Oxycodone-acetaminophen      Other reaction(s): Itching    Ace inhibitors Other (See Comments)     cough       No current facility-administered medications on file prior to encounter.      Current Outpatient Prescriptions on File Prior to Encounter   Medication Sig    aspirin 81 MG Chew Take 1 tablet (81 mg total) by mouth once daily.    carvedilol (COREG) 25 MG tablet Take 0.5 tablets (12.5 mg total) by mouth 2 (two) times daily.    gabapentin (NEURONTIN) 400 MG capsule Take 1 capsule (400 mg total) by mouth 2 (two) times daily. For chronic heel/foot pain    hydrALAZINE (APRESOLINE) 25 MG tablet Take 1 tablet (25 mg total) by mouth every 8 (eight) hours.    isosorbide dinitrate (ISORDIL) 20 MG tablet Take 1 tablet (20 mg total) by mouth 3 (three) times daily.    nitroGLYCERIN (NITROSTAT) 0.4 MG SL tablet Place 1 tablet (0.4 mg total) under the tongue every 5 (five) minutes as needed for Chest pain. Tablet, Sublingual Sublingual    potassium chloride SA (K-DUR,KLOR-CON) 20 MEQ tablet Take by mouth once daily. Patient reports taking 25 mg daily    torsemide (DEMADEX) 20 MG Tab Take 2 tablets (40 mg total) by mouth 2 (two) times daily. (Patient taking differently: Take 20 mg by mouth 2 (two) times daily. )    hydrocodone-acetaminophen 5-325mg (NORCO) 5-325 mg per tablet Take 1 tablet by mouth every 4 (four) hours as needed for Pain.    losartan (COZAAR) 25 MG tablet Take 0.5 tablets (12.5 mg total) by mouth once daily.    oxycodone (OXY-IR) 5 mg Cap Take 1 capsule (5 mg total) by mouth every 4 (four) hours as needed for Pain.    tramadol (ULTRAM) 50 mg tablet Take 1-2 tablets ( mg total) by mouth every 6 (six) hours as needed for Pain.    warfarin (COUMADIN) 7.5 MG  tablet Take 1.5 tablets (11.25 mg total) by mouth Daily. (Patient taking differently: Take 12 mg by mouth Daily. Total 12 mg daily)     Family History     Problem Relation (Age of Onset)    Alcohol abuse Father    Hypertension Mother, Father, Sister, Brother    Stroke Mother        Social History Main Topics    Smoking status: Never Smoker    Smokeless tobacco: Never Used    Alcohol use No    Drug use: No    Sexual activity: No     Review of Systems   Constitutional: Negative for chills and fever.   HENT: Negative for congestion and sore throat.    Eyes: Positive for visual disturbance (blurry vision, double vision).   Respiratory: Positive for shortness of breath. Negative for cough and wheezing.    Cardiovascular: Positive for chest pain (10/10, stabbing, sternal, non-radiating) and leg swelling. Negative for palpitations.   Gastrointestinal: Positive for abdominal distention, nausea and vomiting. Negative for constipation and diarrhea.   Endocrine: Negative for polydipsia and polyuria.   Genitourinary: Negative for dysuria and hematuria.   Musculoskeletal: Positive for myalgias. Negative for arthralgias.   Skin: Negative for rash and wound.   Neurological: Positive for dizziness. Negative for headaches.   Psychiatric/Behavioral: Negative for behavioral problems and confusion.     Objective:     Vital Signs (Most Recent):  Temp: 98.1 °F (36.7 °C) (10/25/17 2223)  Pulse: 68 (10/26/17 0447)  Resp: 16 (10/26/17 0155)  BP: (!) 143/84 (10/26/17 0131)  SpO2: 95 % (10/26/17 0447) Vital Signs (24h Range):  Temp:  [98.1 °F (36.7 °C)] 98.1 °F (36.7 °C)  Pulse:  [68-89] 68  Resp:  [16-18] 16  SpO2:  [95 %-98 %] 95 %  BP: (143-166)/(78-98) 143/84     Weight: 117.9 kg (260 lb)  Body mass index is 38.4 kg/m².    Physical Exam   Constitutional: He is oriented to person, place, and time. He appears well-developed and well-nourished.   HENT:   Head: Normocephalic and atraumatic.   Eyes: Pupils are equal, round, and reactive  to light.   Cardiovascular: Normal rate, regular rhythm and normal heart sounds.    No murmur heard.  Bilateral pitting 3+ edema past knees   Pulmonary/Chest: Effort normal and breath sounds normal. No respiratory distress. He has no wheezes. He has no rales.   Abdominal: Bowel sounds are normal. He exhibits distension. There is tenderness.   Musculoskeletal: Normal range of motion.   Neurological: He is alert and oriented to person, place, and time.   Skin: Skin is warm and dry.   Psychiatric: He has a normal mood and affect. His behavior is normal.        Significant Labs:   CBC:   Recent Labs  Lab 10/25/17  2305   WBC 5.77   RBC 4.11*   HGB 11.4*   HCT 35.9*      MCV 87   MCH 27.7   MCHC 31.8*     BMP:   Recent Labs  Lab 10/25/17  2305         K 3.6      CO2 27   BUN 17   CREATININE 1.4   CALCIUM 9.4     Coagulation:   Recent Labs  Lab 10/25/17  2305   INR 1.7*     Microbiology Results (last 7 days)     ** No results found for the last 168 hours. **            Significant Imaging:     US abdomen: hepatic cirrhosis    CT head: no acute abnormalities    EKG: atrial fibrillation        Assessment/Plan:     * Chest pain    - non-anginal chest pain; likely costochondritis   - 10/10, stabbing, sternal, non-radiating, reproducible  - Troponin 0.062, which is below his baseline  -   - EKG: atrial fibrillation  - CXR: cardiomegaly, Right pleural effusion  - morphine 7mg in ED            Acute on chronic systolic CHF (congestive heart failure)    - 2d echo 4/17: EF 25%, diastolic dysfunction  - CXR: cardiomegaly, Right pleural effusion  - lungs clear on examination  - bilateral 3+ pitting edema past knees  - lasix 40mg in ED  - continue diuresis  - repeat 2d echo          Essential hypertension    - /84  - coreg 12.5mg bid  - hydralazine 25mg q8h  - bidil 20mg tid  - losartan 12.5mg qd        Atrial fibrillation, chronic    - INR 1.7  - coumadin 12mg qd          Chronic  anticoagulation    - coumadin 12mg qd for atrial fibrillation  - INR 1.7            VTE Risk Mitigation         Ordered     warfarin tablet 12 mg  Daily     Route:  Oral        10/26/17 0450     enoxaparin injection 40 mg  Daily     Route:  Subcutaneous        10/26/17 0450     Medium Risk of VTE  Once      10/26/17 0450             Cristian Sharp MD  Department of Hospital Medicine   [facility]

## 2017-10-27 LAB
ALBUMIN SERPL BCP-MCNC: 2.7 G/DL
ALP SERPL-CCNC: 134 U/L
ALT SERPL W/O P-5'-P-CCNC: 8 U/L
ANION GAP SERPL CALC-SCNC: 9 MMOL/L
ANION GAP SERPL CALC-SCNC: 9 MMOL/L
AST SERPL-CCNC: 16 U/L
BASOPHILS # BLD AUTO: 0.08 K/UL
BASOPHILS NFR BLD: 1.5 %
BILIRUB SERPL-MCNC: 1.6 MG/DL
BUN SERPL-MCNC: 20 MG/DL
BUN SERPL-MCNC: 21 MG/DL
CALCIUM SERPL-MCNC: 9.1 MG/DL
CALCIUM SERPL-MCNC: 9.3 MG/DL
CHLORIDE SERPL-SCNC: 101 MMOL/L
CHLORIDE SERPL-SCNC: 99 MMOL/L
CO2 SERPL-SCNC: 31 MMOL/L
CO2 SERPL-SCNC: 32 MMOL/L
CREAT SERPL-MCNC: 1.6 MG/DL
CREAT SERPL-MCNC: 1.7 MG/DL
DIFFERENTIAL METHOD: ABNORMAL
EOSINOPHIL # BLD AUTO: 0.4 K/UL
EOSINOPHIL NFR BLD: 8.1 %
ERYTHROCYTE [DISTWIDTH] IN BLOOD BY AUTOMATED COUNT: 14 %
EST. GFR  (AFRICAN AMERICAN): 52.8 ML/MIN/1.73 M^2
EST. GFR  (AFRICAN AMERICAN): 56.8 ML/MIN/1.73 M^2
EST. GFR  (NON AFRICAN AMERICAN): 45.7 ML/MIN/1.73 M^2
EST. GFR  (NON AFRICAN AMERICAN): 49.2 ML/MIN/1.73 M^2
GLUCOSE SERPL-MCNC: 105 MG/DL
GLUCOSE SERPL-MCNC: 106 MG/DL
HCT VFR BLD AUTO: 33.9 %
HGB BLD-MCNC: 10.7 G/DL
IMM GRANULOCYTES # BLD AUTO: 0.01 K/UL
IMM GRANULOCYTES NFR BLD AUTO: 0.2 %
INR PPP: 1.7
LYMPHOCYTES # BLD AUTO: 1.2 K/UL
LYMPHOCYTES NFR BLD: 22.1 %
MAGNESIUM SERPL-MCNC: 1.9 MG/DL
MCH RBC QN AUTO: 27.8 PG
MCHC RBC AUTO-ENTMCNC: 31.6 G/DL
MCV RBC AUTO: 88 FL
MONOCYTES # BLD AUTO: 1.1 K/UL
MONOCYTES NFR BLD: 20.6 %
NEUTROPHILS # BLD AUTO: 2.6 K/UL
NEUTROPHILS NFR BLD: 47.5 %
NRBC BLD-RTO: 0 /100 WBC
PHOSPHATE SERPL-MCNC: 4 MG/DL
PLATELET # BLD AUTO: 201 K/UL
PMV BLD AUTO: 10.5 FL
POTASSIUM SERPL-SCNC: 3.7 MMOL/L
POTASSIUM SERPL-SCNC: 3.8 MMOL/L
PROT SERPL-MCNC: 6.4 G/DL
PROTHROMBIN TIME: 16.8 SEC
RBC # BLD AUTO: 3.85 M/UL
SODIUM SERPL-SCNC: 140 MMOL/L
SODIUM SERPL-SCNC: 141 MMOL/L
WBC # BLD AUTO: 5.43 K/UL

## 2017-10-27 PROCEDURE — 20600001 HC STEP DOWN PRIVATE ROOM

## 2017-10-27 PROCEDURE — 80048 BASIC METABOLIC PNL TOTAL CA: CPT

## 2017-10-27 PROCEDURE — 36415 COLL VENOUS BLD VENIPUNCTURE: CPT

## 2017-10-27 PROCEDURE — 25000003 PHARM REV CODE 250: Performed by: STUDENT IN AN ORGANIZED HEALTH CARE EDUCATION/TRAINING PROGRAM

## 2017-10-27 PROCEDURE — 85025 COMPLETE CBC W/AUTO DIFF WBC: CPT

## 2017-10-27 PROCEDURE — 84100 ASSAY OF PHOSPHORUS: CPT

## 2017-10-27 PROCEDURE — 99238 HOSP IP/OBS DSCHRG MGMT 30/<: CPT | Mod: ,,, | Performed by: HOSPITALIST

## 2017-10-27 PROCEDURE — 83735 ASSAY OF MAGNESIUM: CPT

## 2017-10-27 PROCEDURE — 85610 PROTHROMBIN TIME: CPT

## 2017-10-27 PROCEDURE — 80053 COMPREHEN METABOLIC PANEL: CPT

## 2017-10-27 RX ORDER — TRAMADOL HYDROCHLORIDE 50 MG/1
50 TABLET ORAL ONCE
Status: COMPLETED | OUTPATIENT
Start: 2017-10-27 | End: 2017-10-27

## 2017-10-27 RX ORDER — ASPIRIN 81 MG/1
81 TABLET ORAL DAILY
COMMUNITY

## 2017-10-27 RX ORDER — CARVEDILOL 25 MG/1
25 TABLET ORAL 2 TIMES DAILY
Status: ON HOLD | COMMUNITY
End: 2019-04-16

## 2017-10-27 RX ORDER — TORSEMIDE 20 MG/1
40 TABLET ORAL 2 TIMES DAILY
Qty: 120 TABLET | Refills: 2 | Status: ON HOLD | OUTPATIENT
Start: 2017-10-27 | End: 2017-11-01

## 2017-10-27 RX ORDER — ATORVASTATIN CALCIUM 20 MG/1
40 TABLET, FILM COATED ORAL DAILY
Status: DISCONTINUED | OUTPATIENT
Start: 2017-10-28 | End: 2017-10-27

## 2017-10-27 RX ADMIN — LOSARTAN POTASSIUM 12.5 MG: 25 TABLET, FILM COATED ORAL at 10:10

## 2017-10-27 RX ADMIN — ASPIRIN 81 MG CHEWABLE TABLET 81 MG: 81 TABLET CHEWABLE at 09:10

## 2017-10-27 RX ADMIN — HYDRALAZINE HYDROCHLORIDE 25 MG: 25 TABLET, FILM COATED ORAL at 03:10

## 2017-10-27 RX ADMIN — ISOSORBIDE DINITRATE 20 MG: 20 TABLET ORAL at 03:10

## 2017-10-27 RX ADMIN — TRAMADOL HYDROCHLORIDE 50 MG: 50 TABLET, FILM COATED ORAL at 09:10

## 2017-10-27 RX ADMIN — ISOSORBIDE DINITRATE 20 MG: 20 TABLET ORAL at 09:10

## 2017-10-27 RX ADMIN — WARFARIN SODIUM 13 MG: 3 TABLET ORAL at 06:10

## 2017-10-27 NOTE — ASSESSMENT & PLAN NOTE
- 2d echo 4/17: EF 33%, improved from previous  - CXR: cardiomegaly, Right pleural effusion  - lungs clear on examination  - bilateral 3+ pitting edema past knees  - lasix 40mg in ED  - Continued to diurese with large volume of urine output throughout his course until cr priti then diurese stopped.

## 2017-10-27 NOTE — ASSESSMENT & PLAN NOTE
- /84 on admission  - coreg 12.5mg bid  - hydralazine 25mg q8h  - bidil 20mg tid  - losartan 12.5mg qd    Coreg held during admission 2/2 bradycardia, restarted on discharge.     Hydralazine held on last day of admission 2/2 lower BPs s/p large volume diuresis.

## 2017-10-27 NOTE — PLAN OF CARE
Problem: Patient Care Overview  Goal: Individualization & Mutuality  Explore and review with pt. His at home practices with dietary and medication compliance, to see if changes need to be made

## 2017-10-27 NOTE — ASSESSMENT & PLAN NOTE
- INR 1.7  - Unclear why patient was not therapeutic. Follow up in coumadin clinic for management.

## 2017-10-27 NOTE — PROGRESS NOTES
"Pt states that he is not going to be able to go home tonight,stating that his vision is too blurry to drive home,and that no one is able to pick him up tonight.Offered to call the  on call to provide a taxi,pt refused,stating "I'm not leaving my truck in [employer]". IM 3 () was notified of pt's refusal to go home.Will continue to monitor.  "

## 2017-10-27 NOTE — MEDICAL/APP STUDENT
"[facility] Hospital Medicine  Progress Note    Patient Name: Hamlet Terrell  MRN: [id number]  Patient Class: IP- Inpatient   Admission Date: 10/25/2017  Length of Stay: 1 days  Attending Physician: Jason Sow MD  Primary Care Provider: Carlin Swift MD    [facility] Medicine Team: [facility] MED 3 Nigel Balbuena    Subjective:     Principal Problem:Intercostal pain  51yo AA M w/ PMHx of MI and CVA (2009), HTN, CAD, a-fib, Pe, HFrEF (25%) presents to ED c/o L sternal chest pain, SOB, worsening BL LE edema, and blurry vision and diplopia x2d. Pt localizes the chest pain to his L sternal border, maximal at 3rd costochondral junction. Pt rates the pain 10/10 and describes it as constant, stabbing "like an ice pick", worse w/ deep breathing, and reproducible w/ palpation. Pt endorses SOB that is worse w/ any exertion and lying supine. Pt endorses worsening orthopnea recently and states that he frequently sleeps sitting upright on the couch and when he does sleep in bed he sleeps w/ 3 pillows. Pt denies PND. Pt states the blurry vision and diplopia started around the same time as the chest pain 2 days ago. He states the blurry vision is present in both eyes, is present w/ both near and far vision, and does not improve w/ covering one eye. Pt denies wearing glasses or contacts at home.    Pt had an MI and CVA in 2009 and was subsequently diagnosed w/ HFrEF w/ the most recent Echo showing EF of 25%. Pt was prescribed torsemide and his PCP has been decreasing his dose over the past few months. Pt reports his MUÑOZ, orthopnea, and leg swelling have worsened throughout this time.     NAEON.    Interval History:  In ED pt was found to have 3+ pitting edema past his knees BL. Pt received zofran, lasix, and morphine. . Troponin 0.062 which is below his baseline. INR 1.7 on coumadin for a-fib. UA: 2+ occult blood. CXR showed cardiomegaly and R pleural effusion. CT head showed no acute abnormalities. EKG " showed a-fib. Pt has been diuresed w/ furosemide 250mg continuous IV and his SOB and leg swelling has improved. Pt's chest pain did not improve w/ morphine or isosorbide dinitrate, but pain did improve overnight w/ 2 PRN doses of ibuprofen and one dose of tramadol this AM.    Review of Systems   Constitutional: Negative for chills, diaphoresis, fatigue and fever.   HENT: Negative.    Eyes: Positive for visual disturbance.   Respiratory: Positive for shortness of breath.    Cardiovascular: Positive for chest pain and leg swelling.   Gastrointestinal: Negative.    Endocrine: Negative.    Genitourinary: Negative.    Musculoskeletal: Positive for arthralgias.   Skin: Negative.    Neurological: Negative for dizziness, weakness, light-headedness and headaches.   Psychiatric/Behavioral: Negative.      Objective:     Vital Signs (Most Recent):  Temp: 97.8 °F (36.6 °C) (10/27/17 0401)  Pulse: 76 (10/27/17 0700)  Resp: 18 (10/27/17 0401)  BP: (!) 91/50 (10/27/17 0401)  SpO2: (!) 92 % (10/27/17 0401) Vital Signs (24h Range):  Temp:  [96.8 °F (36 °C)-98.6 °F (37 °C)] 97.8 °F (36.6 °C)  Pulse:  [45-77] 76  Resp:  [17-18] 18  SpO2:  [88 %-96 %] 92 %  BP: ()/(50-68) 91/50     Weight: 118.5 kg (261 lb 3.2 oz)  Body mass index is 38.57 kg/m².    Intake/Output Summary (Last 24 hours) at 10/27/17 0850  Last data filed at 10/27/17 0600   Gross per 24 hour   Intake             1254 ml   Output             2650 ml   Net            -1396 ml      Physical Exam   Constitutional: He is oriented to person, place, and time. He appears well-developed and well-nourished. He is cooperative. No distress.   Eyes: Conjunctivae and EOM are normal. Pupils are equal, round, and reactive to light.   Cardiovascular: Normal rate, S1 normal and S2 normal.  An irregularly irregular rhythm present. Exam reveals no friction rub.    No murmur heard.  Pulses:       Popliteal pulses are 2+ on the right side, and 2+ on the left side.        Dorsalis pedis  pulses are 0 on the right side.        Posterior tibial pulses are 2+ on the right side.   Pulmonary/Chest: Effort normal and breath sounds normal.   Abdominal: Soft. Bowel sounds are normal. There is no tenderness. There is no rebound and no guarding.   Musculoskeletal: He exhibits edema (2+ tense pitting edema to above knee BL, improving) and tenderness (BL LE TTP).   L 3rd costochondral joint TTP. No swelling, erythema, or warmth.   Neurological: He is alert and oriented to person, place, and time.   Skin: Skin is warm, dry and intact. No rash noted. No cyanosis. Nails show no clubbing.   Psychiatric: He has a normal mood and affect. His speech is normal and behavior is normal.     Significant Labs:   Recent Results (from the past 24 hour(s))   2D echo with color flow doppler    Collection Time: 10/26/17 10:06 AM   Result Value Ref Range    EF 33 (A) 55 - 65    Mitral Valve Regurgitation TRIVIAL TO MILD     Est. PA Systolic Pressure 43.09 (A)     Pericardial Effusion TRIVIAL     Tricuspid Valve Regurgitation TRIVIAL TO MILD    Basic metabolic panel    Collection Time: 10/26/17  7:44 PM   Result Value Ref Range    Sodium 143 136 - 145 mmol/L    Potassium 3.8 3.5 - 5.1 mmol/L    Chloride 102 95 - 110 mmol/L    CO2 28 23 - 29 mmol/L    Glucose 127 (H) 70 - 110 mg/dL    BUN, Bld 18 6 - 20 mg/dL    Creatinine 1.4 0.5 - 1.4 mg/dL    Calcium 9.5 8.7 - 10.5 mg/dL    Anion Gap 13 8 - 16 mmol/L    eGFR if African American >60.0 >60 mL/min/1.73 m^2    eGFR if non  57.8 (A) >60 mL/min/1.73 m^2   Protime-INR    Collection Time: 10/27/17  3:48 AM   Result Value Ref Range    Prothrombin Time 16.8 (H) 9.0 - 12.5 sec    INR 1.7 (H) 0.8 - 1.2   Phosphorus    Collection Time: 10/27/17  3:48 AM   Result Value Ref Range    Phosphorus 4.0 2.7 - 4.5 mg/dL   Magnesium    Collection Time: 10/27/17  3:48 AM   Result Value Ref Range    Magnesium 1.9 1.6 - 2.6 mg/dL   CBC auto differential    Collection Time: 10/27/17  3:48  AM   Result Value Ref Range    WBC 5.43 3.90 - 12.70 K/uL    RBC 3.85 (L) 4.60 - 6.20 M/uL    Hemoglobin 10.7 (L) 14.0 - 18.0 g/dL    Hematocrit 33.9 (L) 40.0 - 54.0 %    MCV 88 82 - 98 fL    MCH 27.8 27.0 - 31.0 pg    MCHC 31.6 (L) 32.0 - 36.0 g/dL    RDW 14.0 11.5 - 14.5 %    Platelets 201 150 - 350 K/uL    MPV 10.5 9.2 - 12.9 fL    Immature Granulocytes 0.2 0.0 - 0.5 %    Gran # 2.6 1.8 - 7.7 K/uL    Immature Grans (Abs) 0.01 0.00 - 0.04 K/uL    Lymph # 1.2 1.0 - 4.8 K/uL    Mono # 1.1 (H) 0.3 - 1.0 K/uL    Eos # 0.4 0.0 - 0.5 K/uL    Baso # 0.08 0.00 - 0.20 K/uL    nRBC 0 0 /100 WBC    Gran% 47.5 38.0 - 73.0 %    Lymph% 22.1 18.0 - 48.0 %    Mono% 20.6 (H) 4.0 - 15.0 %    Eosinophil% 8.1 (H) 0.0 - 8.0 %    Basophil% 1.5 0.0 - 1.9 %    Differential Method Automated    Comprehensive metabolic panel    Collection Time: 10/27/17  3:48 AM   Result Value Ref Range    Sodium 141 136 - 145 mmol/L    Potassium 3.7 3.5 - 5.1 mmol/L    Chloride 101 95 - 110 mmol/L    CO2 31 (H) 23 - 29 mmol/L    Glucose 106 70 - 110 mg/dL    BUN, Bld 20 6 - 20 mg/dL    Creatinine 1.7 (H) 0.5 - 1.4 mg/dL    Calcium 9.1 8.7 - 10.5 mg/dL    Total Protein 6.4 6.0 - 8.4 g/dL    Albumin 2.7 (L) 3.5 - 5.2 g/dL    Total Bilirubin 1.6 (H) 0.1 - 1.0 mg/dL    Alkaline Phosphatase 134 55 - 135 U/L    AST 16 10 - 40 U/L    ALT 8 (L) 10 - 44 U/L    Anion Gap 9 8 - 16 mmol/L    eGFR if African American 52.8 (A) >60 mL/min/1.73 m^2    eGFR if non  45.7 (A) >60 mL/min/1.73 m^2       Significant Imaging:  CXR  - cardiomegaly  - R pleural effusion  CT head w/o contrast  - no acute abnormality  U/S abdo  - hepatomegaly and cirrhosis    Assessment/Plan:      Active Diagnoses:    Diagnosis Date Noted POA    PRINCIPAL PROBLEM:  Intercostal pain [R07.82] 11/18/2013 Yes    Blurry vision, bilateral [H53.8] 10/26/2017 Yes    Exotropia [H50.10] 10/26/2017 Yes    Monocular diplopia of both eyes [H53.2] 10/26/2017 Yes    Acute on chronic systolic  CHF (congestive heart failure) [I50.23] 05/09/2015 Yes    Atrial fibrillation, chronic [I48.2] 01/02/2013 Yes     Chronic    Essential hypertension [I10] 01/02/2013 Yes     Chronic    Chronic anticoagulation [Z79.01] 01/01/2013 Not Applicable     Chronic      Problems Resolved During this Admission:    Diagnosis Date Noted Date Resolved POA    Decreased visual acuity [H54.7] 10/26/2017 10/26/2017 Yes     1. Costochondritis  - non-anginal pain  - improvement w/ ibuprofen 400 and tramadol 50  - try topical diclofenac    2. CHF exacerbation  - 2d hx worsening SOB, orthopnea and BL LE edema  - diuresed w/ furosemide 250mg IV continuous   - SOB and BL LE edema improving  - increased torsemide dose to 40mg BID on discharge    3. JEAN-PIERRE  - Crt 1.7 from 1.4  - most likely due to aggressive diuresis  - d/c furosemide     4. Vision change  - further hx revealed vision changes since 2009  - ophtho consult rec full OTP eye exam w/ plan for glasses      Diet/Code status  - Diet adult regular cardiac    VTE Risk Mitigation         Ordered     warfarin tablet 13 mg  Daily     Route:  Oral        10/27/17 0640     Medium Risk of VTE  Once      10/26/17 1114     Place CAMI hose  Until discontinued      10/26/17 1114         Disposition  - stable for discharge today  - send home w/ torsemide 40mg BID  - PCP f/u  - OTP eye exam  - send home w/ topical diclofenac

## 2017-10-27 NOTE — NURSING
Team 3 pt. Hasd 6 runs of V-TACH this am pt. Is stable no c/o chest but HR 57, b/p 112/60 ordered to hold hydrazline this am, still give lasix 40mg ivp and isosorbide

## 2017-10-27 NOTE — DISCHARGE SUMMARY
"[facility] Hospital Medicine  Discharge Summary      Patient Name: Hamlet Terrell  MRN: [id number]  Admission Date: 10/25/2017  Hospital Length of Stay: 1 days  Discharge Date and Time:  10/27/2017 5:48 PM  Attending Physician: Jason Sow MD   Discharging Provider: Alexandro Hale MD  Primary Care Provider: Carlin Swift MD  Hospital Medicine Team: [facility] HOSP MED 3 Alexandro Hale MD    HPI:   Hamlet Terrell is a 51 y.o. male with Hx of HFrEF(EF 25%), HTN, CAD, CVA/cerebral embolism, A fib,  PE, CKD, TAPAN who presents to [facility] ED sternal chest pain. Pt states that pain started Tuesday, subsided, then came back around 0600 Wednesday. The chest pain is 10/10, stabbing "like an ice pick", sternal, constant, reproducible, without alleviating/exacerbating factors. Pt states that he has been experiencing blurry vision and double vision for the past couple of days as well. Pt vomited ~500cc of non bloody non bilious emesis in the ED. Pt endorses SOB and bilateral lower extremity swelling.    In the ED, he received Zofran 4mg x2, Lasix 40mg, Morphine 7mg.        Troponin 0.062 (below baseline)    INR 1.7    UA: 2+ occult blood    CXR: cardiomegaly and Right pleural effusion    US abdomen: hepatic cirrhosis    CT head: no acute abnormalities    EKG: atrial fibrillation       Indwelling Lines/Drains at time of discharge:   Lines/Drains/Airways          No matching active lines, drains, or airways        Hospital Course:   Patient was worked up for cardiac chest pain which showed troponemia but this was lower than in previous admissions and declined on repeat. His pain was reproducible on palpation and no ischemic changes were noted on EKG. Patient was in A fib throughout his course but was intermittently bradycardic with no obvious heart block and not while on B blockers which were held during admission.    Patient presented with orthopnea, chronic lower extremity edema and SOB. He was diuresed for " 2 days and was net negative 2100 for admission with improvement in respiratory status. Echo was performed which showed EF 33%. Initially was started on Lasix drip but transitioned to IV pushes for ease on nursing and refusal to get another line.  He was discharged on double his home dose of torsemide, increased from 20 BID to 40 BID.     He developed an JEAN-PIERRE during diuresis which improved after holding on last day of admission. Cr increased from 1.3 to 1.7 then down to 1.6.     While inpatient, US of abdomen was performed which showed possible cirrhotic changes to the liver. No hepatitis panel identified in records. Will need outpatient f/u to determine if 2/2 heart failure or other etiology.     Patient was in Atrial Fibrillation throughout his course here. Home cardiac regimen included Asa, B Blocker, ARB (cannot tolerate ACE), isurdil, and hydralazine for BP management. Hydralazine stopped during admission due to low pressures while being diuresed.     Clinically patient improved during his course and was wandering the halls on the last day of his admission.      Patient may benefit from statin therapy given history of cardiac disease. F/u with PCP to gauge risks/benefits    Review of Systems   Constitutional: Negative for chills, diaphoresis, fatigue and fever.   HENT: Negative.    Eyes: Positive for visual disturbance.   Respiratory: Negative for SOB  Cardiovascular: Positive for chest pain and leg swelling.   Gastrointestinal: Negative.    Endocrine: Negative.    Genitourinary: Negative.    Musculoskeletal: Positive for arthralgias.   Skin: Negative.    Neurological: Negative for dizziness, weakness, light-headedness and headaches.   Psychiatric/Behavioral: Negative.       Objective:      Vitals:    10/27/17 1550   BP: (!) 96/50   Pulse: 63   Resp: 18   Temp: 96.2 °F (35.7 °C)       Physical Exam   Constitutional: He is oriented to person, place, and time. He appears well-developed and well-nourished. He is  cooperative. No distress.   Eyes: Conjunctivae and EOM are normal. Pupils are equal, round, and reactive to light. No obvious nystagmus. Mild clouding of corneas bilaterally.   Cardiovascular: Normal rate, S1 normal and S2 normal.  An irregularly irregular rhythm present.  No murmur heard.  Pulmonary/Chest: Effort normal and breath sounds normal.   Abdominal: Soft. Bowel sounds are normal. There is no tenderness. There is no rebound and no guarding.   Musculoskeletal: He exhibits tense lower extremity edema bilaterally, 3+ to knee bilaterally with overlying trophic changes suggesting chronic edema.   Chest wall ttp.   Neurological: He is alert and oriented to person, place, and time.   Skin: Skin is warm, dry and intact. No rash noted. No cyanosis. Nails show no clubbing.   Psychiatric: He has a normal mood and affect. His speech is normal and behavior is normal.      Consults:   Consults         Status Ordering Provider     Inpatient consult to Ophthalmology  Once     Provider:  (Not yet assigned)    FELIPA [place]          Significant Diagnostic Studies: Labs:   BMP:   Recent Labs  Lab 10/26/17  0744 10/26/17  1944 10/27/17  0348 10/27/17  1330   * 127* 106 105    143 141 140   K 3.7 3.8 3.7 3.8    102 101 99   CO2 31* 28 31* 32*   BUN 16 18 20 21*   CREATININE 1.3 1.4 1.7* 1.6*   CALCIUM 9.5 9.5 9.1 9.3   MG 1.4*  --  1.9  --    , CMP   Recent Labs  Lab 10/25/17  2305 10/26/17  0744 10/26/17  1944 10/27/17  0348 10/27/17  1330    142 143 141 140   K 3.6 3.7 3.8 3.7 3.8    102 102 101 99   CO2 27 31* 28 31* 32*    117* 127* 106 105   BUN 17 16 18 20 21*   CREATININE 1.4 1.3 1.4 1.7* 1.6*   CALCIUM 9.4 9.5 9.5 9.1 9.3   PROT 7.4 7.8  --  6.4  --    ALBUMIN 3.2* 3.4*  --  2.7*  --    BILITOT 2.4* 2.6*  --  1.6*  --    ALKPHOS 155* 153*  --  134  --    AST 20 20  --  16  --    ALT 10 9*  --  8*  --    ANIONGAP 9 9 13 9 9   ESTGFRAFRICA >60.0 >60.0 >60.0 52.8* 56.8*    EGFRNONAA 58.2* >60.0 57.8* 45.7* 49.2*   , CBC   Recent Labs  Lab 10/25/17  2305 10/26/17  0745 10/27/17  0348   WBC 5.77 6.57 5.43   HGB 11.4* 11.9* 10.7*   HCT 35.9* 37.7* 33.9*    212 201   , INR   Lab Results   Component Value Date    INR 1.7 (H) 10/27/2017    INR 1.7 (H) 10/25/2017    INR 1.6 (H) 08/12/2017    and Troponin   Recent Labs  Lab 10/26/17  0744   TROPONINI 0.040*       Pending Diagnostic Studies:     None        Final Active Diagnoses:    Diagnosis Date Noted POA    PRINCIPAL PROBLEM:  Intercostal pain [R07.82] 11/18/2013 Yes    Blurry vision, bilateral [H53.8] 10/26/2017 Yes    Exotropia [H50.10] 10/26/2017 Yes    Monocular diplopia of both eyes [H53.2] 10/26/2017 Yes    Acute on chronic systolic CHF (congestive heart failure) [I50.23] 05/09/2015 Yes    Atrial fibrillation, chronic [I48.2] 01/02/2013 Yes     Chronic    Essential hypertension [I10] 01/02/2013 Yes     Chronic    Chronic anticoagulation [Z79.01] 01/01/2013 Not Applicable     Chronic      Problems Resolved During this Admission:    Diagnosis Date Noted Date Resolved POA    Decreased visual acuity [H54.7] 10/26/2017 10/26/2017 Yes      * Intercostal pain    - non-anginal chest pain; likely costochondritis   - 10/10, stabbing, sternal, non-radiating, reproducible  - Troponin 0.062, which is below his baseline  -   - EKG: atrial fibrillation  - CXR: cardiomegaly, Right pleural effusion  - morphine 7mg in ED  -Improved to 7/10 on day of discharge s/p toradol given before morning chemistry panel showed JEAN-PIERRE.   Cannot take Tylenol 2/2 reaction of itching.               Monocular diplopia of both eyes    Unclear etiology. CT Head in ED showed NAP.   Evaluated by ophthalmology who felt this was a chronic problem and which required vision correction and follow up as outpatient.           Blurry vision, bilateral    Ophthalmology consulted and after evaluation patient required vision correction. F/u as outpatient.            Acute on chronic systolic CHF (congestive heart failure)    - 2d echo 4/17: EF 33%, improved from previous  - CXR: cardiomegaly, Right pleural effusion  - lungs clear on examination  - bilateral 3+ pitting edema past knees  - lasix 40mg in ED  - Continued to diurese with large volume of urine output throughout his course until cr priti then diurese stopped.           Essential hypertension    - /84 on admission  - coreg 12.5mg bid  - hydralazine 25mg q8h  - bidil 20mg tid  - losartan 12.5mg qd    Coreg held during admission 2/2 bradycardia, restarted on discharge.     Hydralazine held on last day of admission 2/2 lower BPs s/p large volume diuresis.        Atrial fibrillation, chronic    - INR 1.7  - Unclear why patient was not therapeutic. Follow up in coumadin clinic for management.           Chronic anticoagulation    - Continue home warfarin and follow up in coumadin clinic              Discharged Condition: stable    Disposition: Home or Self Care    Follow Up:  Follow-up Information     Schedule an appointment as soon as possible for a visit with Carlin Swift MD.    Specialty:  Family Medicine  Contact information:  [address] [place] [address] [phone]                 Patient Instructions:     Diet general     Activity as tolerated       Medications:  Reconciled Home Medications:   Current Discharge Medication List      CONTINUE these medications which have CHANGED    Details   torsemide (DEMADEX) 20 MG Tab Take 2 tablets (40 mg total) by mouth 2 (two) times daily.  Qty: 120 tablet, Refills: 2         CONTINUE these medications which have NOT CHANGED    Details   aspirin (ECOTRIN) 81 MG EC tablet Take 81 mg by mouth once daily.      carvedilol (COREG) 25 MG tablet Take 25 mg by mouth 2 (two) times daily.      isosorbide dinitrate (ISORDIL) 20 MG tablet Take 1 tablet (20 mg total) by mouth 3 (three) times daily.  Qty: 90 tablet, Refills: 11      losartan (COZAAR) 25 MG  tablet Take 0.5 tablets (12.5 mg total) by mouth once daily.  Qty: 15 tablet, Refills: 2      warfarin (COUMADIN) 7.5 MG tablet Take 1.5 tablets (11.25 mg total) by mouth Daily.  Qty: 45 tablet, Refills: 0      gabapentin (NEURONTIN) 400 MG capsule Take 1 capsule (400 mg total) by mouth 2 (two) times daily. For chronic heel/foot pain  Qty: 60 capsule, Refills: 2      nitroGLYCERIN (NITROSTAT) 0.4 MG SL tablet Place 1 tablet (0.4 mg total) under the tongue every 5 (five) minutes as needed for Chest pain. Tablet, Sublingual Sublingual  Qty: 30 tablet, Refills: 11         STOP taking these medications       aspirin 81 MG Chew Comments:   Reason for Stopping:         hydrALAZINE (APRESOLINE) 25 MG tablet Comments:   Reason for Stopping:         potassium bicarbonate (K-LYTE) disintegrating tablet Comments:   Reason for Stopping:         potassium chloride SA (K-DUR,KLOR-CON) 20 MEQ tablet Comments:   Reason for Stopping:         hydrocodone-acetaminophen 5-325mg (NORCO) 5-325 mg per tablet Comments:   Reason for Stopping:         oxycodone (OXY-IR) 5 mg Cap Comments:   Reason for Stopping:         tramadol (ULTRAM) 50 mg tablet Comments:   Reason for Stopping:             Time spent on the discharge of patient: 30 minutes      Alexandro Hale MD   Internal Medicine PGY-1  [phone]

## 2017-10-27 NOTE — HOSPITAL COURSE
Patient was worked up for cardiac chest pain which showed troponemia but this was lower than in previous admissions and declined on repeat. His pain was reproducible on palpation and no ischemic changes were noted on EKG. Patient was in A fib throughout his course but was intermittently bradycardic with no obvious heart block and not while on B blockers which were held during admission.    Patient presented with orthopnea, chronic lower extremity edema and SOB. He was diuresed for 2 days and was net negative 2100 for admission with improvement in respiratory status. Echo was performed which showed EF 33%. Initially was started on Lasix drip but transitioned to IV pushes for ease on nursing and refusal to get another line.  He was discharged on double his home dose of torsemide, increased from 20 BID to 40 BID.     He developed an JEAN-PIERRE during diuresis which improved after holding on last day of admission. Cr increased from 1.3 to 1.7 then down to 1.6.     While inpatient, US of abdomen was performed which showed possible cirrhotic changes to the liver. No hepatitis panel identified in records. Will need outpatient f/u to determine if 2/2 heart failure or other etiology.     Patient was in Atrial Fibrillation throughout his course here. Home cardiac regimen included Asa, B Blocker, ARB (cannot tolerate ACE), isurdil, and hydralazine for BP management. Hydralazine stopped during admission due to low pressures while being diuresed.     Clinically patient improved during his course and was wandering the halls on the last day of his admission.

## 2017-10-27 NOTE — ASSESSMENT & PLAN NOTE
Ophthalmology consulted and after evaluation patient required vision correction. F/u as outpatient.

## 2017-10-27 NOTE — PLAN OF CARE
Problem: Patient Care Overview  Goal: Plan of Care Review  Outcome: Ongoing (interventions implemented as appropriate)  Medication and dietary regimen reviewed with patient .

## 2017-10-27 NOTE — ASSESSMENT & PLAN NOTE
Unclear etiology. CT Head in ED showed NAP.   Evaluated by ophthalmology who felt this was a chronic problem and which required vision correction and follow up as outpatient.

## 2017-10-27 NOTE — PHYSICIAN QUERY
PT Name: Hamlet Terrell  MR #: [id number]  Physician Query Form - Renal Clarification     CDS/: Kelsey Oliveira               Contact information: [email or web address]     This form is a permanent document in the medical record.     QueryDate: October 27, 2017    By submitting this query, we are merely seeking further clarification of documentation. Please utilize your independent clinical judgment when addressing the question(s) below.    The Medical record contains the following:   Indicator Supporting Clinical Findings Location in Medical Record    Kidney (Renal) Insufficiency     X Kidney (Renal) Failure / Injury  CKD   H&P 10/26   X Nephrotoxic Agents Lasix 10mg/hr ivpb  Lasix 40mg ivp tid   Lasix 40mg ivpx one    MAR 10/26  MAR 10/26=10/27  MAR 10/26   X BUN/Creatinine GFR CR 1.4-1.6  BUN 17-31  GFR  >60-56.8     Urine: Casts         Eosinophils      Dehydration      Nausea/Vomiting      Dialysis/CRRT      Treatment:      Other:          Provider, please specify the diagnosis or diagnoses associated with above clinical findings.    [ ] Acute on Chronic Renal Failure (please specify stage*): _____________    [ ] Chronic Kidney Disease (CKD) (please specify stage* ):______________      *[employer] Definitions:   Stage Description      eGFR (mL/min)     I  Slight kidney damage with normal or increased filtration  90+     II  Mildly reduced kidney function     60-89    III  Moderately reduced kidney function    30-59    IV  Severly reduced kidney function     15-29    V  Kidney failure, requiring transplant or dialysis   < 15      [x ] Other (please specify): ___Acute Kidney Injury____________________________    [ ] Clinically Undetermined    Please document in your progress notes daily for the duration of treatment, until resolved, and include in your discharge summary.

## 2017-10-27 NOTE — ASSESSMENT & PLAN NOTE
- non-anginal chest pain; likely costochondritis   - 10/10, stabbing, sternal, non-radiating, reproducible  - Troponin 0.062, which is below his baseline  -   - EKG: atrial fibrillation  - CXR: cardiomegaly, Right pleural effusion  - morphine 7mg in ED  -Improved to 7/10 on day of discharge s/p toradol given before morning chemistry panel showed JEAN-PIERRE.   Cannot take Tylenol 2/2 reaction of itching.

## 2017-10-27 NOTE — SUBJECTIVE & OBJECTIVE
Interval History: Chest pain improved this morning to 7 from 10 s/p NSAID.     Review of Systems   Constitutional: Negative for chills and fever.   HENT: Negative for sore throat and trouble swallowing.    Eyes: Negative for photophobia and visual disturbance.   Respiratory: Positive for shortness of breath. Negative for cough and wheezing.    Cardiovascular: Positive for chest pain and leg swelling.   Gastrointestinal: Negative for abdominal pain, constipation, diarrhea, nausea and vomiting.   Genitourinary: Negative for dysuria and hematuria.   Musculoskeletal: Negative for arthralgias and myalgias.   Skin: Positive for color change.   Neurological: Negative for syncope, weakness, numbness and headaches.   Psychiatric/Behavioral: Negative for confusion.     Objective:     Vital Signs (Most Recent):  Temp: 97.8 °F (36.6 °C) (10/27/17 0401)  Pulse: 76 (10/27/17 0700)  Resp: 18 (10/27/17 0401)  BP: (!) 91/50 (10/27/17 0401)  SpO2: (!) 92 % (10/27/17 0401) Vital Signs (24h Range):  Temp:  [96.8 °F (36 °C)-98.6 °F (37 °C)] 97.8 °F (36.6 °C)  Pulse:  [45-77] 76  Resp:  [17-18] 18  SpO2:  [88 %-96 %] 92 %  BP: ()/(50-68) 91/50     Weight: 118.5 kg (261 lb 3.2 oz)  Body mass index is 38.57 kg/m².    Intake/Output Summary (Last 24 hours) at 10/27/17 0959  Last data filed at 10/27/17 0946   Gross per 24 hour   Intake             1254 ml   Output             3350 ml   Net            -2096 ml      Physical Exam   Constitutional: He appears well-developed and well-nourished. No distress.   Ambulating in hallway in no distress with no supplemental oxygen.    HENT:   Head: Normocephalic and atraumatic.   Eyes: Conjunctivae and EOM are normal. Pupils are equal, round, and reactive to light.   Pulmonary/Chest: Effort normal and breath sounds normal. No respiratory distress. He has no wheezes. He has no rales. He exhibits tenderness.   Abdominal: Soft. He exhibits no distension. There is no tenderness. There is no guarding.    Musculoskeletal: He exhibits edema.   Skin: He is not diaphoretic.   Nursing note and vitals reviewed.      Significant Labs:   Recent Lab Results       10/27/17  0348 10/26/17  1944      Immature Granulocytes 0.2      Immature Grans (Abs) 0.01      Albumin 2.7(L)      Alkaline Phosphatase 134      ALT 8(L)      Anion Gap 9 13     AST 16      Baso # 0.08      Basophil% 1.5      Total Bilirubin 1.6  Comment:  For infants and newborns, interpretation of results should be based  on gestational age, weight and in agreement with clinical  observations.  Premature Infant recommended reference ranges:  Up to 24 hours.............<8.0 mg/dL  Up to 48 hours............<12.0 mg/dL  3-5 days..................<15.0 mg/dL  6-29 days.................<15.0 mg/dL  (H)      BUN, Bld 20 18     Calcium 9.1 9.5     Chloride 101 102     CO2 31(H) 28     Creatinine 1.7(H) 1.4     Differential Method Automated      eGFR if African American 52.8(A) >60.0     eGFR if non  45.7  Comment:  Calculation used to obtain the estimated glomerular filtration  rate (eGFR) is the CKD-EPI equation. Since race is unknown   in our information system, the eGFR values for   -American and Non--American patients are given   for each creatinine result.  (A) 57.8  Comment:  Calculation used to obtain the estimated glomerular filtration  rate (eGFR) is the CKD-EPI equation. Since race is unknown   in our information system, the eGFR values for   -American and Non--American patients are given   for each creatinine result.  (A)     Eos # 0.4      Eosinophil% 8.1(H)      Glucose 106 127(H)     Gran # 2.6      Gran% 47.5      Hematocrit 33.9(L)      Hemoglobin 10.7(L)      Coumadin Monitoring INR 1.7  Comment:  Coumadin Therapy:  2.0 - 3.0 for INR for all indicators except mechanical heart valves  and antiphospholipid syndromes which should use 2.5 - 3.5.  (H)      Lymph # 1.2      Lymph% 22.1      Magnesium 1.9       MCH 27.8      MCHC 31.6(L)      MCV 88      Mono # 1.1(H)      Mono% 20.6(H)      MPV 10.5      nRBC 0      Phosphorus 4.0      Platelets 201      Potassium 3.7 3.8     Total Protein 6.4      Protime 16.8(H)      RBC 3.85(L)      RDW 14.0      Sodium 141 143     WBC 5.43

## 2017-10-28 VITALS
TEMPERATURE: 98 F | WEIGHT: 261.19 LBS | SYSTOLIC BLOOD PRESSURE: 120 MMHG | BODY MASS INDEX: 38.68 KG/M2 | RESPIRATION RATE: 18 BRPM | HEART RATE: 67 BPM | HEIGHT: 69 IN | DIASTOLIC BLOOD PRESSURE: 69 MMHG | OXYGEN SATURATION: 95 %

## 2017-10-28 LAB
ALBUMIN SERPL BCP-MCNC: 2.9 G/DL
ALP SERPL-CCNC: 135 U/L
ALT SERPL W/O P-5'-P-CCNC: 7 U/L
ANION GAP SERPL CALC-SCNC: 9 MMOL/L
AST SERPL-CCNC: 16 U/L
BASOPHILS # BLD AUTO: 0.06 K/UL
BASOPHILS NFR BLD: 1 %
BILIRUB SERPL-MCNC: 1 MG/DL
BUN SERPL-MCNC: 22 MG/DL
CALCIUM SERPL-MCNC: 9.6 MG/DL
CHLORIDE SERPL-SCNC: 97 MMOL/L
CO2 SERPL-SCNC: 33 MMOL/L
CREAT SERPL-MCNC: 1.5 MG/DL
DIFFERENTIAL METHOD: ABNORMAL
EOSINOPHIL # BLD AUTO: 0.5 K/UL
EOSINOPHIL NFR BLD: 8.6 %
ERYTHROCYTE [DISTWIDTH] IN BLOOD BY AUTOMATED COUNT: 13.9 %
EST. GFR  (AFRICAN AMERICAN): >60 ML/MIN/1.73 M^2
EST. GFR  (NON AFRICAN AMERICAN): 53.1 ML/MIN/1.73 M^2
GLUCOSE SERPL-MCNC: 82 MG/DL
HCT VFR BLD AUTO: 34.7 %
HGB BLD-MCNC: 10.9 G/DL
IMM GRANULOCYTES # BLD AUTO: 0.01 K/UL
IMM GRANULOCYTES NFR BLD AUTO: 0.2 %
INR PPP: 2.3
LYMPHOCYTES # BLD AUTO: 1.6 K/UL
LYMPHOCYTES NFR BLD: 26.5 %
MAGNESIUM SERPL-MCNC: 1.7 MG/DL
MCH RBC QN AUTO: 27.6 PG
MCHC RBC AUTO-ENTMCNC: 31.4 G/DL
MCV RBC AUTO: 88 FL
MONOCYTES # BLD AUTO: 1.1 K/UL
MONOCYTES NFR BLD: 19.5 %
NEUTROPHILS # BLD AUTO: 2.6 K/UL
NEUTROPHILS NFR BLD: 44.2 %
NRBC BLD-RTO: 0 /100 WBC
PHOSPHATE SERPL-MCNC: 3.9 MG/DL
PLATELET # BLD AUTO: 210 K/UL
PMV BLD AUTO: 10.5 FL
POTASSIUM SERPL-SCNC: 3.8 MMOL/L
PROT SERPL-MCNC: 6.8 G/DL
PROTHROMBIN TIME: 23 SEC
RBC # BLD AUTO: 3.95 M/UL
SODIUM SERPL-SCNC: 139 MMOL/L
WBC # BLD AUTO: 5.84 K/UL

## 2017-10-28 PROCEDURE — 80053 COMPREHEN METABOLIC PANEL: CPT

## 2017-10-28 PROCEDURE — 25000003 PHARM REV CODE 250: Performed by: SURGERY

## 2017-10-28 PROCEDURE — 36415 COLL VENOUS BLD VENIPUNCTURE: CPT

## 2017-10-28 PROCEDURE — 83735 ASSAY OF MAGNESIUM: CPT

## 2017-10-28 PROCEDURE — 25000003 PHARM REV CODE 250: Performed by: STUDENT IN AN ORGANIZED HEALTH CARE EDUCATION/TRAINING PROGRAM

## 2017-10-28 PROCEDURE — 85610 PROTHROMBIN TIME: CPT

## 2017-10-28 PROCEDURE — 85025 COMPLETE CBC W/AUTO DIFF WBC: CPT

## 2017-10-28 PROCEDURE — 84100 ASSAY OF PHOSPHORUS: CPT

## 2017-10-28 RX ORDER — IBUPROFEN 400 MG/1
400 TABLET ORAL ONCE
Status: COMPLETED | OUTPATIENT
Start: 2017-10-28 | End: 2017-10-28

## 2017-10-28 RX ADMIN — ISOSORBIDE DINITRATE 20 MG: 20 TABLET ORAL at 05:10

## 2017-10-28 RX ADMIN — LOSARTAN POTASSIUM 12.5 MG: 25 TABLET, FILM COATED ORAL at 09:10

## 2017-10-28 RX ADMIN — ASPIRIN 81 MG CHEWABLE TABLET 81 MG: 81 TABLET CHEWABLE at 09:10

## 2017-10-28 RX ADMIN — IBUPROFEN 400 MG: 400 TABLET, FILM COATED ORAL at 05:10

## 2017-10-28 RX ADMIN — HYDRALAZINE HYDROCHLORIDE 25 MG: 25 TABLET, FILM COATED ORAL at 05:10

## 2017-10-28 NOTE — PROGRESS NOTES
Saline lock was dc'd. Discharge instructions were given to pt with understanding verbalized.Pt awake,alert,verbally responsive.No distress noted,breathing unlabored.Pt escort was requested.Will continue to monitor.

## 2017-10-30 ENCOUNTER — NURSE TRIAGE (OUTPATIENT)
Dept: ADMINISTRATIVE | Facility: CLINIC | Age: 52
End: 2017-10-30

## 2017-10-30 ENCOUNTER — HOSPITAL ENCOUNTER (INPATIENT)
Facility: HOSPITAL | Age: 52
LOS: 1 days | Discharge: HOME | DRG: 292 | End: 2017-11-01
Attending: EMERGENCY MEDICINE | Admitting: HOSPITALIST
Payer: MEDICAID

## 2017-10-30 ENCOUNTER — PATIENT OUTREACH (OUTPATIENT)
Dept: ADMINISTRATIVE | Facility: CLINIC | Age: 52
End: 2017-10-30

## 2017-10-30 DIAGNOSIS — I42.0 DILATED CARDIOMYOPATHY: Chronic | ICD-10-CM

## 2017-10-30 DIAGNOSIS — I48.20 ATRIAL FIBRILLATION, CHRONIC: Chronic | ICD-10-CM

## 2017-10-30 DIAGNOSIS — I50.23 ACUTE ON CHRONIC SYSTOLIC CHF (CONGESTIVE HEART FAILURE): ICD-10-CM

## 2017-10-30 DIAGNOSIS — I50.9 CONGESTIVE HEART FAILURE, UNSPECIFIED CONGESTIVE HEART FAILURE CHRONICITY, UNSPECIFIED CONGESTIVE HEART FAILURE TYPE: ICD-10-CM

## 2017-10-30 DIAGNOSIS — R07.9 CHEST PAIN: Primary | ICD-10-CM

## 2017-10-30 DIAGNOSIS — I10 ESSENTIAL HYPERTENSION: Chronic | ICD-10-CM

## 2017-10-30 DIAGNOSIS — R07.89 OTHER CHEST PAIN: ICD-10-CM

## 2017-10-30 LAB
ALBUMIN SERPL BCP-MCNC: 3.2 G/DL
ALP SERPL-CCNC: 152 U/L
ALT SERPL W/O P-5'-P-CCNC: 10 U/L
ANION GAP SERPL CALC-SCNC: 11 MMOL/L
AST SERPL-CCNC: 24 U/L
BASOPHILS # BLD AUTO: 0.04 K/UL
BASOPHILS NFR BLD: 0.7 %
BILIRUB SERPL-MCNC: 2.7 MG/DL
BNP SERPL-MCNC: 207 PG/ML
BUN SERPL-MCNC: 21 MG/DL
CALCIUM SERPL-MCNC: 9.6 MG/DL
CHLORIDE SERPL-SCNC: 101 MMOL/L
CO2 SERPL-SCNC: 29 MMOL/L
CREAT SERPL-MCNC: 1.2 MG/DL
DIFFERENTIAL METHOD: ABNORMAL
EOSINOPHIL # BLD AUTO: 0.3 K/UL
EOSINOPHIL NFR BLD: 5.8 %
ERYTHROCYTE [DISTWIDTH] IN BLOOD BY AUTOMATED COUNT: 13.8 %
EST. GFR  (AFRICAN AMERICAN): >60 ML/MIN/1.73 M^2
EST. GFR  (NON AFRICAN AMERICAN): >60 ML/MIN/1.73 M^2
GLUCOSE SERPL-MCNC: 79 MG/DL
HCT VFR BLD AUTO: 38 %
HGB BLD-MCNC: 12 G/DL
IMM GRANULOCYTES # BLD AUTO: 0.01 K/UL
IMM GRANULOCYTES NFR BLD AUTO: 0.2 %
LYMPHOCYTES # BLD AUTO: 1.4 K/UL
LYMPHOCYTES NFR BLD: 25.4 %
MAGNESIUM SERPL-MCNC: 1.4 MG/DL
MCH RBC QN AUTO: 27.3 PG
MCHC RBC AUTO-ENTMCNC: 31.6 G/DL
MCV RBC AUTO: 86 FL
MONOCYTES # BLD AUTO: 0.7 K/UL
MONOCYTES NFR BLD: 13.7 %
NEUTROPHILS # BLD AUTO: 2.9 K/UL
NEUTROPHILS NFR BLD: 54.2 %
NRBC BLD-RTO: 0 /100 WBC
PHOSPHATE SERPL-MCNC: 2.5 MG/DL
PLATELET # BLD AUTO: 201 K/UL
PMV BLD AUTO: 10.2 FL
POTASSIUM SERPL-SCNC: 4.3 MMOL/L
PROT SERPL-MCNC: 7.9 G/DL
RBC # BLD AUTO: 4.4 M/UL
SODIUM SERPL-SCNC: 141 MMOL/L
TROPONIN I SERPL DL<=0.01 NG/ML-MCNC: 0.05 NG/ML
WBC # BLD AUTO: 5.39 K/UL

## 2017-10-30 PROCEDURE — 85610 PROTHROMBIN TIME: CPT

## 2017-10-30 PROCEDURE — 80053 COMPREHEN METABOLIC PANEL: CPT

## 2017-10-30 PROCEDURE — 84100 ASSAY OF PHOSPHORUS: CPT

## 2017-10-30 PROCEDURE — 83735 ASSAY OF MAGNESIUM: CPT

## 2017-10-30 PROCEDURE — 96374 THER/PROPH/DIAG INJ IV PUSH: CPT

## 2017-10-30 PROCEDURE — 83880 ASSAY OF NATRIURETIC PEPTIDE: CPT

## 2017-10-30 PROCEDURE — 11000001 HC ACUTE MED/SURG PRIVATE ROOM

## 2017-10-30 PROCEDURE — 99285 EMERGENCY DEPT VISIT HI MDM: CPT | Mod: 25

## 2017-10-30 PROCEDURE — 85025 COMPLETE CBC W/AUTO DIFF WBC: CPT

## 2017-10-30 PROCEDURE — 25000003 PHARM REV CODE 250: Performed by: STUDENT IN AN ORGANIZED HEALTH CARE EDUCATION/TRAINING PROGRAM

## 2017-10-30 PROCEDURE — 99285 EMERGENCY DEPT VISIT HI MDM: CPT | Mod: ,,, | Performed by: EMERGENCY MEDICINE

## 2017-10-30 PROCEDURE — 93010 ELECTROCARDIOGRAM REPORT: CPT | Mod: ,,, | Performed by: INTERNAL MEDICINE

## 2017-10-30 PROCEDURE — 84484 ASSAY OF TROPONIN QUANT: CPT

## 2017-10-30 RX ORDER — ASPIRIN 325 MG
325 TABLET ORAL
Status: COMPLETED | OUTPATIENT
Start: 2017-10-30 | End: 2017-10-30

## 2017-10-30 RX ORDER — FUROSEMIDE 10 MG/ML
80 INJECTION INTRAMUSCULAR; INTRAVENOUS
Status: COMPLETED | OUTPATIENT
Start: 2017-10-31 | End: 2017-10-31

## 2017-10-30 RX ADMIN — ASPIRIN 325 MG ORAL TABLET 325 MG: 325 PILL ORAL at 09:10

## 2017-10-30 NOTE — PLAN OF CARE
CM contacted by patients insurance questioning why the patient stayed in the hospital past 10/27/17.CM stated that the last review would be sent.CM sent last review as requested

## 2017-10-30 NOTE — PROVIDER PROGRESS NOTES - EMERGENCY DEPT.
Encounter Date: 10/30/2017    ED Physician Progress Notes        Physician Note:   I, Shayla Mathias, am scribing for, and in the presence of, Dr. Zayas. I performed the above scribed service and the documentation accurately describes the services I performed. I attest to the accuracy of the note.      EKG - STEMI Decision  Initial Reading: No STEMI present.

## 2017-10-30 NOTE — PATIENT INSTRUCTIONS
Noncardiac Chest Pain    Based on your visit today, the healthcare provider doesnt know what is causing your chest pain. In most cases, people who come to the emergency department with chest pain dont have a problem with their heart. Instead, the pain is caused by other conditions. It's important for the healthcare team to be sure you are not having a life threatening cause for chest pain such as a heart attack, blood clot in the lungs, collapsed lung, ruptured esophagus, or tearing of the aorta. Once these major causes have been ruled out, you may have further evaluation for non-heart causes of chest pain. These may be problems with the lungs, muscles, bones, digestive tract, nerves, or mental health.  Lung problems  · Inflammation around the lungs (pleurisy)  · Collapsed lung (pneumothorax)  · Fluid around the lungs (pleural effusion)  · Lung cancer (a rare cause of chest pain)  Muscle or bone problems  · Inflamed cartilage between the ribs (costochondritis)  · Fibromyalgia  · Rheumatoid arthritis  · Chest wall strain  Digestive system problems  · Reflux  · Stomach ulcer  · Spasms of the esophagus  · Gall stones  · Gallbladder inflammation  Mental health conditions  · Panic or anxiety attacks  · Emotional distress  Your condition doesnt seem serious and your pain doesnt appear to be coming from your heart. But sometimes the signs of a serious problem take more time to appear. Watch for the warning signs listed below.  Home care  Follow these guidelines when caring for yourself at home:  · Rest today and avoid strenuous activity.  · Take any prescribed medicine as directed.  Follow-up care  Follow up with your healthcare provider, or as advised, if you dont start to feel better within 24 hours.  When to seek medical advice  Call your healthcare provider right away if any of these occur:  · A change in the type of pain. Call if it feels different, becomes more serious, lasts longer, or begins to spread into  your shoulder, arm, neck, jaw, or back.  · Shortness of breath  · You feel more pain when you breathe  · Cough with dark-colored mucus or blood  · Weakness, dizziness, or fainting  · Fever of 100.4ºF (38ºC) or higher, or as directed by your healthcare provider  · Swelling, pain, or redness in one leg  Date Last Reviewed: 12/1/2016  © 5042-9207 [employer]. [address] [place] [address], [place] [address]. All rights reserved. This information is not intended as a substitute for professional medical care. Always follow your healthcare professional's instructions.

## 2017-10-30 NOTE — TELEPHONE ENCOUNTER
Reason for Disposition   Chest pain lasting longer than 5 minutes   Chest pain lasting longer than 5 minutes and ANY of the following:* Over 50 years old* Over 30 years old and at least one cardiac risk factor (i.e., high blood pressure, diabetes, high cholesterol, obesity, smoker or strong family history of heart disease)* Pain is crushing, pressure-like, or heavy * Took nitroglycerin and chest pain was not relieved* History of heart disease (i.e., angina, heart attack, bypass surgery, angioplasty, CHF)   Difficulty breathing    Protocols used: ST CHEST PAIN-A-OH

## 2017-10-31 PROBLEM — R07.9 CHEST PAIN: Status: ACTIVE | Noted: 2017-10-31

## 2017-10-31 LAB
ALBUMIN SERPL BCP-MCNC: 3.5 G/DL
ALP SERPL-CCNC: 172 U/L
ALT SERPL W/O P-5'-P-CCNC: 11 U/L
ANION GAP SERPL CALC-SCNC: 12 MMOL/L
AST SERPL-CCNC: 24 U/L
BASOPHILS # BLD AUTO: 0.07 K/UL
BASOPHILS NFR BLD: 1.1 %
BILIRUB SERPL-MCNC: 2.9 MG/DL
BILIRUB UR QL STRIP: NEGATIVE
BUN SERPL-MCNC: 24 MG/DL
CALCIUM SERPL-MCNC: 10.3 MG/DL
CHLORIDE SERPL-SCNC: 97 MMOL/L
CLARITY UR REFRACT.AUTO: CLEAR
CO2 SERPL-SCNC: 33 MMOL/L
COLOR UR AUTO: NORMAL
CREAT SERPL-MCNC: 1.7 MG/DL
DIFFERENTIAL METHOD: ABNORMAL
EOSINOPHIL # BLD AUTO: 0.5 K/UL
EOSINOPHIL NFR BLD: 7.5 %
ERYTHROCYTE [DISTWIDTH] IN BLOOD BY AUTOMATED COUNT: 13.8 %
EST. GFR  (AFRICAN AMERICAN): 52.8 ML/MIN/1.73 M^2
EST. GFR  (NON AFRICAN AMERICAN): 45.7 ML/MIN/1.73 M^2
GLUCOSE SERPL-MCNC: 114 MG/DL
GLUCOSE UR QL STRIP: NEGATIVE
HCT VFR BLD AUTO: 39.8 %
HGB BLD-MCNC: 12.6 G/DL
HGB UR QL STRIP: NEGATIVE
IMM GRANULOCYTES # BLD AUTO: 0.01 K/UL
IMM GRANULOCYTES NFR BLD AUTO: 0.2 %
INR PPP: 1.6
KETONES UR QL STRIP: NEGATIVE
LEUKOCYTE ESTERASE UR QL STRIP: NEGATIVE
LYMPHOCYTES # BLD AUTO: 1.6 K/UL
LYMPHOCYTES NFR BLD: 24.7 %
MAGNESIUM SERPL-MCNC: 1.8 MG/DL
MCH RBC QN AUTO: 27.6 PG
MCHC RBC AUTO-ENTMCNC: 31.7 G/DL
MCV RBC AUTO: 87 FL
MONOCYTES # BLD AUTO: 1.1 K/UL
MONOCYTES NFR BLD: 17.1 %
NEUTROPHILS # BLD AUTO: 3.2 K/UL
NEUTROPHILS NFR BLD: 49.4 %
NITRITE UR QL STRIP: NEGATIVE
NRBC BLD-RTO: 0 /100 WBC
PH UR STRIP: 8 [PH] (ref 5–8)
PHOSPHATE SERPL-MCNC: 2.3 MG/DL
PLATELET # BLD AUTO: 238 K/UL
PMV BLD AUTO: 10.4 FL
POTASSIUM SERPL-SCNC: 3.6 MMOL/L
PROT SERPL-MCNC: 8.5 G/DL
PROT UR QL STRIP: NEGATIVE
PROTHROMBIN TIME: 16.4 SEC
RBC # BLD AUTO: 4.56 M/UL
SODIUM SERPL-SCNC: 142 MMOL/L
SP GR UR STRIP: 1 (ref 1–1.03)
URN SPEC COLLECT METH UR: NORMAL
UROBILINOGEN UR STRIP-ACNC: 2 EU/DL
WBC # BLD AUTO: 6.44 K/UL

## 2017-10-31 PROCEDURE — 97535 SELF CARE MNGMENT TRAINING: CPT

## 2017-10-31 PROCEDURE — 25000003 PHARM REV CODE 250: Performed by: STUDENT IN AN ORGANIZED HEALTH CARE EDUCATION/TRAINING PROGRAM

## 2017-10-31 PROCEDURE — 36415 COLL VENOUS BLD VENIPUNCTURE: CPT

## 2017-10-31 PROCEDURE — 85025 COMPLETE CBC W/AUTO DIFF WBC: CPT

## 2017-10-31 PROCEDURE — 25000003 PHARM REV CODE 250: Performed by: SURGERY

## 2017-10-31 PROCEDURE — 80053 COMPREHEN METABOLIC PANEL: CPT

## 2017-10-31 PROCEDURE — 25000003 PHARM REV CODE 250: Performed by: INTERNAL MEDICINE

## 2017-10-31 PROCEDURE — 81003 URINALYSIS AUTO W/O SCOPE: CPT

## 2017-10-31 PROCEDURE — 84100 ASSAY OF PHOSPHORUS: CPT

## 2017-10-31 PROCEDURE — 83735 ASSAY OF MAGNESIUM: CPT

## 2017-10-31 PROCEDURE — 63600175 PHARM REV CODE 636 W HCPCS: Performed by: SURGERY

## 2017-10-31 PROCEDURE — 97165 OT EVAL LOW COMPLEX 30 MIN: CPT

## 2017-10-31 PROCEDURE — 99222 1ST HOSP IP/OBS MODERATE 55: CPT | Mod: ,,, | Performed by: HOSPITALIST

## 2017-10-31 PROCEDURE — 11000001 HC ACUTE MED/SURG PRIVATE ROOM

## 2017-10-31 PROCEDURE — 63600175 PHARM REV CODE 636 W HCPCS: Performed by: STUDENT IN AN ORGANIZED HEALTH CARE EDUCATION/TRAINING PROGRAM

## 2017-10-31 RX ORDER — METHOCARBAMOL 500 MG/1
500 TABLET, FILM COATED ORAL 4 TIMES DAILY
Status: DISCONTINUED | OUTPATIENT
Start: 2017-10-31 | End: 2017-11-01 | Stop reason: HOSPADM

## 2017-10-31 RX ORDER — INSULIN ASPART 100 [IU]/ML
0-5 INJECTION, SOLUTION INTRAVENOUS; SUBCUTANEOUS
Status: DISCONTINUED | OUTPATIENT
Start: 2017-10-31 | End: 2017-11-01 | Stop reason: HOSPADM

## 2017-10-31 RX ORDER — TORSEMIDE 20 MG/1
40 TABLET ORAL 2 TIMES DAILY
Status: DISCONTINUED | OUTPATIENT
Start: 2017-10-31 | End: 2017-10-31

## 2017-10-31 RX ORDER — HEPARIN SODIUM 5000 [USP'U]/ML
5000 INJECTION, SOLUTION INTRAVENOUS; SUBCUTANEOUS EVERY 8 HOURS
Status: DISCONTINUED | OUTPATIENT
Start: 2017-10-31 | End: 2017-10-31

## 2017-10-31 RX ORDER — ENOXAPARIN SODIUM 100 MG/ML
30 INJECTION SUBCUTANEOUS EVERY 24 HOURS
Status: DISCONTINUED | OUTPATIENT
Start: 2017-10-31 | End: 2017-10-31

## 2017-10-31 RX ORDER — ASPIRIN 81 MG/1
81 TABLET ORAL DAILY
Status: DISCONTINUED | OUTPATIENT
Start: 2017-10-31 | End: 2017-11-01 | Stop reason: HOSPADM

## 2017-10-31 RX ORDER — METOLAZONE 2.5 MG/1
2.5 TABLET ORAL DAILY
Status: DISCONTINUED | OUTPATIENT
Start: 2017-11-01 | End: 2017-11-01 | Stop reason: HOSPADM

## 2017-10-31 RX ORDER — METOCLOPRAMIDE HYDROCHLORIDE 5 MG/ML
5 INJECTION INTRAMUSCULAR; INTRAVENOUS EVERY 6 HOURS PRN
Status: DISCONTINUED | OUTPATIENT
Start: 2017-10-31 | End: 2017-11-01 | Stop reason: HOSPADM

## 2017-10-31 RX ORDER — METOLAZONE 2.5 MG/1
2.5 TABLET ORAL ONCE
Status: COMPLETED | OUTPATIENT
Start: 2017-10-31 | End: 2017-10-31

## 2017-10-31 RX ORDER — IBUPROFEN 200 MG
24 TABLET ORAL
Status: DISCONTINUED | OUTPATIENT
Start: 2017-10-31 | End: 2017-11-01 | Stop reason: HOSPADM

## 2017-10-31 RX ORDER — GLUCAGON 1 MG
1 KIT INJECTION
Status: DISCONTINUED | OUTPATIENT
Start: 2017-10-31 | End: 2017-11-01 | Stop reason: HOSPADM

## 2017-10-31 RX ORDER — IBUPROFEN 200 MG
16 TABLET ORAL
Status: DISCONTINUED | OUTPATIENT
Start: 2017-10-31 | End: 2017-11-01 | Stop reason: HOSPADM

## 2017-10-31 RX ORDER — ONDANSETRON 8 MG/1
8 TABLET, ORALLY DISINTEGRATING ORAL EVERY 8 HOURS PRN
Status: DISCONTINUED | OUTPATIENT
Start: 2017-10-31 | End: 2017-11-01 | Stop reason: HOSPADM

## 2017-10-31 RX ORDER — CARVEDILOL 25 MG/1
25 TABLET ORAL 2 TIMES DAILY
Status: DISCONTINUED | OUTPATIENT
Start: 2017-10-31 | End: 2017-11-01 | Stop reason: HOSPADM

## 2017-10-31 RX ORDER — POLYETHYLENE GLYCOL 3350 17 G/17G
17 POWDER, FOR SOLUTION ORAL DAILY
Status: DISCONTINUED | OUTPATIENT
Start: 2017-10-31 | End: 2017-11-01 | Stop reason: HOSPADM

## 2017-10-31 RX ORDER — METOLAZONE 2.5 MG/1
2.5 TABLET ORAL DAILY
Status: DISCONTINUED | OUTPATIENT
Start: 2017-10-31 | End: 2017-10-31

## 2017-10-31 RX ORDER — LIDOCAINE 50 MG/G
1 PATCH TOPICAL
Status: DISCONTINUED | OUTPATIENT
Start: 2017-10-31 | End: 2017-11-01 | Stop reason: HOSPADM

## 2017-10-31 RX ORDER — ENOXAPARIN SODIUM 100 MG/ML
30 INJECTION SUBCUTANEOUS EVERY 24 HOURS
Status: DISCONTINUED | OUTPATIENT
Start: 2017-10-31 | End: 2017-11-01 | Stop reason: HOSPADM

## 2017-10-31 RX ORDER — ISOSORBIDE DINITRATE 10 MG/1
20 TABLET ORAL 3 TIMES DAILY
Status: DISCONTINUED | OUTPATIENT
Start: 2017-10-31 | End: 2017-11-01 | Stop reason: HOSPADM

## 2017-10-31 RX ORDER — TORSEMIDE 20 MG/1
40 TABLET ORAL 2 TIMES DAILY
Status: DISCONTINUED | OUTPATIENT
Start: 2017-10-31 | End: 2017-11-01 | Stop reason: HOSPADM

## 2017-10-31 RX ORDER — HEPARIN SODIUM 5000 [USP'U]/ML
5000 INJECTION, SOLUTION INTRAVENOUS; SUBCUTANEOUS EVERY 12 HOURS
Status: DISCONTINUED | OUTPATIENT
Start: 2017-10-31 | End: 2017-11-01 | Stop reason: SDUPTHER

## 2017-10-31 RX ORDER — FUROSEMIDE 10 MG/ML
80 INJECTION INTRAMUSCULAR; INTRAVENOUS 3 TIMES DAILY
Status: DISCONTINUED | OUTPATIENT
Start: 2017-10-31 | End: 2017-10-31

## 2017-10-31 RX ORDER — RAMELTEON 8 MG/1
8 TABLET ORAL NIGHTLY PRN
Status: DISCONTINUED | OUTPATIENT
Start: 2017-10-31 | End: 2017-11-01 | Stop reason: HOSPADM

## 2017-10-31 RX ADMIN — FUROSEMIDE 10 MG/HR: 10 INJECTION, SOLUTION INTRAVENOUS at 06:10

## 2017-10-31 RX ADMIN — TORSEMIDE 40 MG: 20 TABLET ORAL at 09:10

## 2017-10-31 RX ADMIN — METOLAZONE 2.5 MG: 2.5 TABLET ORAL at 03:10

## 2017-10-31 RX ADMIN — ISOSORBIDE DINITRATE 20 MG: 10 TABLET ORAL at 06:10

## 2017-10-31 RX ADMIN — ISOSORBIDE DINITRATE 20 MG: 10 TABLET ORAL at 02:10

## 2017-10-31 RX ADMIN — LOSARTAN POTASSIUM 12.5 MG: 25 TABLET, FILM COATED ORAL at 09:10

## 2017-10-31 RX ADMIN — METHOCARBAMOL 500 MG: 500 TABLET ORAL at 05:10

## 2017-10-31 RX ADMIN — METHOCARBAMOL 500 MG: 500 TABLET ORAL at 06:10

## 2017-10-31 RX ADMIN — CARVEDILOL 25 MG: 25 TABLET, FILM COATED ORAL at 09:10

## 2017-10-31 RX ADMIN — ASPIRIN 81 MG: 81 TABLET, COATED ORAL at 09:10

## 2017-10-31 RX ADMIN — TORSEMIDE 40 MG: 20 TABLET ORAL at 02:10

## 2017-10-31 RX ADMIN — ENOXAPARIN SODIUM 30 MG: 100 INJECTION SUBCUTANEOUS at 05:10

## 2017-10-31 RX ADMIN — WARFARIN SODIUM 12.5 MG: 2.5 TABLET ORAL at 05:10

## 2017-10-31 RX ADMIN — METHOCARBAMOL 500 MG: 500 TABLET ORAL at 11:10

## 2017-10-31 RX ADMIN — METOLAZONE 2.5 MG: 2.5 TABLET ORAL at 05:10

## 2017-10-31 RX ADMIN — FUROSEMIDE 80 MG: 10 INJECTION, SOLUTION INTRAVENOUS at 12:10

## 2017-10-31 RX ADMIN — ISOSORBIDE DINITRATE 20 MG: 10 TABLET ORAL at 09:10

## 2017-10-31 NOTE — SUBJECTIVE & OBJECTIVE
Past Medical History:   Diagnosis Date    Anticoagulant long-term use     Cardiomegaly     CHF (congestive heart failure)     Chronic combined systolic and diastolic congestive heart failure     Coronary artery disease     CVA (cerebrovascular accident)     Heart attack 2006    Hypertension     Hyperthyroidism, subclinical 1/2/2013    MI (myocardial infarction) 9/22/2013    MI in 2009      Paroxysmal atrial fibrillation     PE (pulmonary embolism) 1/1/2013    IN 2010     S/P ablation of atrial flutter 2008    Stroke 2009    no residual weaknesses       Past Surgical History:   Procedure Laterality Date    RADIOFREQUENCY ABLATION  01/08/2008    for atrial flutter       Review of patient's allergies indicates:   Allergen Reactions    Acetaminophen      Itching    Oxycodone-acetaminophen      Other reaction(s): Itching    Ace inhibitors Other (See Comments)     cough       No current facility-administered medications on file prior to encounter.      Current Outpatient Prescriptions on File Prior to Encounter   Medication Sig    aspirin (ECOTRIN) 81 MG EC tablet Take 81 mg by mouth once daily.    carvedilol (COREG) 25 MG tablet Take 25 mg by mouth 2 (two) times daily.    isosorbide dinitrate (ISORDIL) 20 MG tablet Take 1 tablet (20 mg total) by mouth 3 (three) times daily.    losartan (COZAAR) 25 MG tablet Take 0.5 tablets (12.5 mg total) by mouth once daily.    nitroGLYCERIN (NITROSTAT) 0.4 MG SL tablet Place 1 tablet (0.4 mg total) under the tongue every 5 (five) minutes as needed for Chest pain. Tablet, Sublingual Sublingual    torsemide (DEMADEX) 20 MG Tab Take 2 tablets (40 mg total) by mouth 2 (two) times daily.    warfarin (COUMADIN) 7.5 MG tablet Take 1.5 tablets (11.25 mg total) by mouth Daily. (Patient taking differently: Take 12 mg by mouth Daily. Total 12 mg daily)    gabapentin (NEURONTIN) 400 MG capsule Take 1 capsule (400 mg total) by mouth 2 (two) times daily. For chronic  heel/foot pain     Family History     Problem Relation (Age of Onset)    Alcohol abuse Father    Hypertension Mother, Father, Sister, Brother    Stroke Mother        Social History Main Topics    Smoking status: Never Smoker    Smokeless tobacco: Never Used    Alcohol use No    Drug use: No    Sexual activity: No     Review of Systems   Constitutional: Positive for activity change. Negative for chills and fever.   HENT: Negative for congestion, facial swelling and sore throat.    Eyes: Negative for visual disturbance.   Respiratory: Positive for shortness of breath. Negative for cough and wheezing.    Cardiovascular: Positive for leg swelling. Negative for palpitations.   Gastrointestinal: Negative for abdominal distention, abdominal pain, nausea and vomiting.   Genitourinary: Negative for difficulty urinating and dysuria.   Musculoskeletal: Negative for arthralgias, back pain and neck pain.   Neurological: Negative for dizziness, light-headedness and headaches.   Psychiatric/Behavioral: Negative for agitation, behavioral problems and confusion.     Objective:     Vital Signs (Most Recent):  Temp: 98.3 °F (36.8 °C) (10/31/17 0305)  Pulse: 77 (10/31/17 0305)  Resp: 18 (10/31/17 0305)  BP: 137/74 (10/31/17 0305)  SpO2: (!) 90 % (10/31/17 0305) Vital Signs (24h Range):  Temp:  [97.5 °F (36.4 °C)-98.4 °F (36.9 °C)] 98.3 °F (36.8 °C)  Pulse:  [62-77] 77  Resp:  [17-18] 18  SpO2:  [90 %-98 %] 90 %  BP: (133-164)/(71-91) 137/74     Weight: 113.8 kg (250 lb 14.1 oz)  Body mass index is 37.05 kg/m².    Physical Exam   Constitutional: He is oriented to person, place, and time. He appears well-developed and well-nourished. No distress.   HENT:   Head: Normocephalic and atraumatic.   Mouth/Throat: Oropharynx is clear and moist.   Eyes: EOM are normal. Pupils are equal, round, and reactive to light.   Neck: Normal range of motion. Neck supple. JVD present. No tracheal deviation present.   Cardiovascular: Intact distal  pulses.  Exam reveals no gallop and no friction rub.    No murmur heard.  Irregularly irregular rhythm   Pulmonary/Chest: Effort normal. No stridor. No respiratory distress. He has rales (bibasilar). He exhibits tenderness.   Abdominal: Soft. Bowel sounds are normal. He exhibits no distension and no mass. There is no tenderness.   Musculoskeletal: He exhibits edema (3+ pitting to knee bilaterally).   Neurological: He is alert and oriented to person, place, and time. No cranial nerve deficit or sensory deficit.   Skin: Skin is warm and dry.   Psychiatric: He has a normal mood and affect. His behavior is normal. Thought content normal.        Significant Labs:   Recent Lab Results       10/30/17  2357 10/30/17  2157 10/30/17  2050      Immature Granulocytes   0.2     Immature Grans (Abs)   0.01     Albumin  3.2(L)      Alkaline Phosphatase  152(H)      ALT  10      Anion Gap  11      AST  24      Baso #   0.04     Basophil%   0.7     Total Bilirubin  2.7  Comment:  For infants and newborns, interpretation of results should be based  on gestational age, weight and in agreement with clinical  observations.  Premature Infant recommended reference ranges:  Up to 24 hours.............<8.0 mg/dL  Up to 48 hours............<12.0 mg/dL  3-5 days..................<15.0 mg/dL  6-29 days.................<15.0 mg/dL  (H)      BNP   207  Comment:  Values of less than 100 pg/ml are consistent with non-CHF populations.(H)     BUN, Bld  21(H)      Calcium  9.6      Chloride  101      CO2  29      Creatinine  1.2      Differential Method   Automated     eGFR if   >60.0      eGFR if non   >60.0  Comment:  Calculation used to obtain the estimated glomerular filtration  rate (eGFR) is the CKD-EPI equation. Since race is unknown   in our information system, the eGFR values for   -American and Non--American patients are given   for each creatinine result.        Eos #   0.3     Eosinophil%    5.8     Glucose  79      Gran #   2.9     Gran%   54.2     Hematocrit   38.0(L)     Hemoglobin   12.0(L)     Coumadin Monitoring INR 1.6  Comment:  Coumadin Therapy:  2.0 - 3.0 for INR for all indicators except mechanical heart valves  and antiphospholipid syndromes which should use 2.5 - 3.5.  (H)       Lymph #   1.4     Lymph%   25.4     Magnesium  1.4(L)      MCH   27.3     MCHC   31.6(L)     MCV   86     Mono #   0.7     Mono%   13.7     MPV   10.2     nRBC   0     Phosphorus  2.5(L)      Platelets   201     Potassium  4.3      Total Protein  7.9      Protime 16.4(H)       RBC   4.40(L)     RDW   13.8     Sodium  141      Troponin I   0.054  Comment:  The reference interval for Troponin I represents the 99th percentile   cutoff   for our facility and is consistent with 3rd generation assay   performance.  (H)     WBC   5.39           Significant Imaging: I have reviewed all pertinent imaging results/findings within the past 24 hours.

## 2017-10-31 NOTE — H&P
[facility] Hospital Medicine  History & Physical    Patient Name: Hamlet Terrell  MRN: [id number]  Admission Date: 10/30/2017  Attending Physician: Jason Sow MD   Primary Care Provider: Carlin Swift MD    [facility] Medicine Team: [facility]OSP MED 3 Tru Potter MD     Patient information was obtained from patient and ER records.     Subjective:     Principal Problem:Acute on chronic systolic CHF (congestive heart failure)    Chief Complaint:   Chief Complaint   Patient presents with    Chest Pain     just dc'd on friday for same thing chest pain, blurry vision and sob again, hx chf    Shortness of Breath        HPI: Hamlet Terrell is a 52 y.o. male with HFrEF (33%), HTN, CAD, CVA/cerebral embolism, paroxysmal a-fib,  PE, CKD, TAPAN who presents to [facility] ED left parasternal chest pain, orthopnea, PND and bilateral lower extremity edema.  He reports that since he has been discharged he has had orthopnea forcing him to sleep in a chair and continues to have a sharp parasternal chest pain that is reproducible with palpation.  He was recently discharged 10/27/2017 for the same complaint.  During that admission he was started on a lasix gtt and was transitioned to IV pushes.  Upon discharged he was started on double the dose of his home torsemide.  On that particular dose he reports that he has not had an increase in his urine output and continues to retain fluid.  For his chest pain he reports taking SL nitro with some relief.  During his previous admission his pain was controlled with NSAIDs.   Upon arrival in the ED patient had an EKG performed that showed rate controlled a-fib unchanged from previous studies.  CXR demonstrating pulmonary venous congestion indicative of fluid overload.  He was given a dose of Lasix 80mg IV.  Cardiology was consulted who recommends gentle IV diuresis and pain control.    Past Medical History:   Diagnosis Date    Anticoagulant long-term use     Cardiomegaly      CHF (congestive heart failure)     Chronic combined systolic and diastolic congestive heart failure     Coronary artery disease     CVA (cerebrovascular accident)     Heart attack 2006    Hypertension     Hyperthyroidism, subclinical 1/2/2013    MI (myocardial infarction) 9/22/2013    MI in 2009      Paroxysmal atrial fibrillation     PE (pulmonary embolism) 1/1/2013    IN 2010     S/P ablation of atrial flutter 2008    Stroke 2009    no residual weaknesses       Past Surgical History:   Procedure Laterality Date    RADIOFREQUENCY ABLATION  01/08/2008    for atrial flutter       Review of patient's allergies indicates:   Allergen Reactions    Acetaminophen      Itching    Oxycodone-acetaminophen      Other reaction(s): Itching    Ace inhibitors Other (See Comments)     cough       No current facility-administered medications on file prior to encounter.      Current Outpatient Prescriptions on File Prior to Encounter   Medication Sig    aspirin (ECOTRIN) 81 MG EC tablet Take 81 mg by mouth once daily.    carvedilol (COREG) 25 MG tablet Take 25 mg by mouth 2 (two) times daily.    isosorbide dinitrate (ISORDIL) 20 MG tablet Take 1 tablet (20 mg total) by mouth 3 (three) times daily.    losartan (COZAAR) 25 MG tablet Take 0.5 tablets (12.5 mg total) by mouth once daily.    nitroGLYCERIN (NITROSTAT) 0.4 MG SL tablet Place 1 tablet (0.4 mg total) under the tongue every 5 (five) minutes as needed for Chest pain. Tablet, Sublingual Sublingual    torsemide (DEMADEX) 20 MG Tab Take 2 tablets (40 mg total) by mouth 2 (two) times daily.    warfarin (COUMADIN) 7.5 MG tablet Take 1.5 tablets (11.25 mg total) by mouth Daily. (Patient taking differently: Take 12 mg by mouth Daily. Total 12 mg daily)    gabapentin (NEURONTIN) 400 MG capsule Take 1 capsule (400 mg total) by mouth 2 (two) times daily. For chronic heel/foot pain     Family History     Problem Relation (Age of Onset)    Alcohol abuse Father     Hypertension Mother, Father, Sister, Brother    Stroke Mother        Social History Main Topics    Smoking status: Never Smoker    Smokeless tobacco: Never Used    Alcohol use No    Drug use: No    Sexual activity: No     Review of Systems   Constitutional: Positive for activity change. Negative for chills and fever.   HENT: Negative for congestion, facial swelling and sore throat.    Eyes: Negative for visual disturbance.   Respiratory: Positive for shortness of breath. Negative for cough and wheezing.    Cardiovascular: Positive for leg swelling. Negative for palpitations.   Gastrointestinal: Negative for abdominal distention, abdominal pain, nausea and vomiting.   Genitourinary: Negative for difficulty urinating and dysuria.   Musculoskeletal: Negative for arthralgias, back pain and neck pain.   Neurological: Negative for dizziness, light-headedness and headaches.   Psychiatric/Behavioral: Negative for agitation, behavioral problems and confusion.     Objective:     Vital Signs (Most Recent):  Temp: 98.3 °F (36.8 °C) (10/31/17 0305)  Pulse: 77 (10/31/17 0305)  Resp: 18 (10/31/17 0305)  BP: 137/74 (10/31/17 0305)  SpO2: (!) 90 % (10/31/17 0305) Vital Signs (24h Range):  Temp:  [97.5 °F (36.4 °C)-98.4 °F (36.9 °C)] 98.3 °F (36.8 °C)  Pulse:  [62-77] 77  Resp:  [17-18] 18  SpO2:  [90 %-98 %] 90 %  BP: (133-164)/(71-91) 137/74     Weight: 113.8 kg (250 lb 14.1 oz)  Body mass index is 37.05 kg/m².    Physical Exam   Constitutional: He is oriented to person, place, and time. He appears well-developed and well-nourished. No distress.   HENT:   Head: Normocephalic and atraumatic.   Mouth/Throat: Oropharynx is clear and moist.   Eyes: EOM are normal. Pupils are equal, round, and reactive to light.   Neck: Normal range of motion. Neck supple. JVD present. No tracheal deviation present.   Cardiovascular: Intact distal pulses.  Exam reveals no gallop and no friction rub.    No murmur heard.  Irregularly irregular  rhythm   Pulmonary/Chest: Effort normal. No stridor. No respiratory distress. He has rales (bibasilar). He exhibits tenderness.   Abdominal: Soft. Bowel sounds are normal. He exhibits no distension and no mass. There is no tenderness.   Musculoskeletal: He exhibits edema (3+ pitting to knee bilaterally).   Neurological: He is alert and oriented to person, place, and time. No cranial nerve deficit or sensory deficit.   Skin: Skin is warm and dry.   Psychiatric: He has a normal mood and affect. His behavior is normal. Thought content normal.        Significant Labs:   Recent Lab Results       10/30/17  2357 10/30/17  2157 10/30/17  2050      Immature Granulocytes   0.2     Immature Grans (Abs)   0.01     Albumin  3.2(L)      Alkaline Phosphatase  152(H)      ALT  10      Anion Gap  11      AST  24      Baso #   0.04     Basophil%   0.7     Total Bilirubin  2.7  Comment:  For infants and newborns, interpretation of results should be based  on gestational age, weight and in agreement with clinical  observations.  Premature Infant recommended reference ranges:  Up to 24 hours.............<8.0 mg/dL  Up to 48 hours............<12.0 mg/dL  3-5 days..................<15.0 mg/dL  6-29 days.................<15.0 mg/dL  (H)      BNP   207  Comment:  Values of less than 100 pg/ml are consistent with non-CHF populations.(H)     BUN, Bld  21(H)      Calcium  9.6      Chloride  101      CO2  29      Creatinine  1.2      Differential Method   Automated     eGFR if   >60.0      eGFR if non   >60.0  Comment:  Calculation used to obtain the estimated glomerular filtration  rate (eGFR) is the CKD-EPI equation. Since race is unknown   in our information system, the eGFR values for   -American and Non--American patients are given   for each creatinine result.        Eos #   0.3     Eosinophil%   5.8     Glucose  79      Gran #   2.9     Gran%   54.2     Hematocrit   38.0(L)     Hemoglobin    12.0(L)     Coumadin Monitoring INR 1.6  Comment:  Coumadin Therapy:  2.0 - 3.0 for INR for all indicators except mechanical heart valves  and antiphospholipid syndromes which should use 2.5 - 3.5.  (H)       Lymph #   1.4     Lymph%   25.4     Magnesium  1.4(L)      MCH   27.3     MCHC   31.6(L)     MCV   86     Mono #   0.7     Mono%   13.7     MPV   10.2     nRBC   0     Phosphorus  2.5(L)      Platelets   201     Potassium  4.3      Total Protein  7.9      Protime 16.4(H)       RBC   4.40(L)     RDW   13.8     Sodium  141      Troponin I   0.054  Comment:  The reference interval for Troponin I represents the 99th percentile   cutoff   for our facility and is consistent with 3rd generation assay   performance.  (H)     WBC   5.39           Significant Imaging: I have reviewed all pertinent imaging results/findings within the past 24 hours.    Assessment/Plan:     * Acute on chronic systolic CHF (congestive heart failure)    EF 33% on echocardiogram obtained during previous admission.  Home torsemide dose was increased from 20mg BID to 40mg BID without increase in diuresis.    -Will start Lasix gtt with metolazone  -Continue Coreg, Imdur, and losartan   -Strict I &O's          Chest pain    Chest pain reproducible with palpation.  EKG unchanged from previous studies.    -Likely represents continued costochondritis   -Start Robaxin and heat therapy  Will consider NSAID therapy pending renal function.           Atrial fibrillation, chronic    Patient continues to be rate controlled with home beta blocker,  Will continue.            Blurry vision, bilateral    Patient was evaluated by ophthalmology during previous admission for blurred vision related to myopia with astigmatism.    -Will need outpatient follow up with personal ophthalmology           Coronary artery disease    Continue aspirin 81mg daily, consider starting statin therapy          Essential hypertension    Continue Isosorbide dinitrate, Coreg,  losartan            VTE Risk Mitigation         Ordered     warfarin tablet 12.5 mg  Daily     Route:  Oral        10/31/17 0303     enoxaparin injection 30 mg  Daily     Route:  Subcutaneous        10/31/17 0334     Medium Risk of VTE  Once      10/31/17 0303             Tru Potter MD  Department of Hospital Medicine   [facility]

## 2017-10-31 NOTE — CONSULTS
Cardiology Consult Note   Physician Requesting Consultation: Tru Gonzalez MD  Reason for Consultation: Chest Pain    HPI:   This is a 53 y/o man with PMHx of HFrEF (30-35%), HTN, CAD, CVA, and persistent aFib on coumadin who presents to the ED with complaints of chest pain and SOB. The patient was recently admitted from 10/25/17 - 10/27/17 for ADHF and non-cardiac chest pain.     The patient states that his chest discomfort, blurry vision, and swollen legs have not improved since discharge and he felt that he was sent home too soon. MUÑOZ when walking short distances.    ROS:    Constitution: Negative for fever or chills. Negative for weight loss or gain.   HENT: Negative for sore throat or headaches. Negative for rhinorrhea.  Eyes: Negative for blurred or double vision.   Cardiovascular: See above  Pulmonary: Negative for SOB. Negative for cough.   Gastrointestinal: Negative for abdominal pain. Negative for nausea/ vomiting. Negative for diarrhea.   : Negative for dysuria.   Neurological: Negative for focal weakness or sensory changes.  PMH:     Past Medical History:   Diagnosis Date    Anticoagulant long-term use     Cardiomegaly     CHF (congestive heart failure)     Chronic combined systolic and diastolic congestive heart failure     Coronary artery disease     CVA (cerebrovascular accident)     Heart attack 2006    Hypertension     Hyperthyroidism, subclinical 1/2/2013    MI (myocardial infarction) 9/22/2013    MI in 2009      Paroxysmal atrial fibrillation     PE (pulmonary embolism) 1/1/2013    IN 2010     S/P ablation of atrial flutter 2008    Stroke 2009    no residual weaknesses     Past Surgical History:   Procedure Laterality Date    RADIOFREQUENCY ABLATION  01/08/2008    for atrial flutter     Allergies:     Review of patient's allergies indicates:   Allergen Reactions    Acetaminophen      Itching    Oxycodone-acetaminophen      Other reaction(s): Itching    Ace  inhibitors Other (See Comments)     cough       Medications:     No current facility-administered medications on file prior to encounter.      Current Outpatient Prescriptions on File Prior to Encounter   Medication Sig    aspirin (ECOTRIN) 81 MG EC tablet Take 81 mg by mouth once daily.    carvedilol (COREG) 25 MG tablet Take 25 mg by mouth 2 (two) times daily.    isosorbide dinitrate (ISORDIL) 20 MG tablet Take 1 tablet (20 mg total) by mouth 3 (three) times daily.    losartan (COZAAR) 25 MG tablet Take 0.5 tablets (12.5 mg total) by mouth once daily.    nitroGLYCERIN (NITROSTAT) 0.4 MG SL tablet Place 1 tablet (0.4 mg total) under the tongue every 5 (five) minutes as needed for Chest pain. Tablet, Sublingual Sublingual    torsemide (DEMADEX) 20 MG Tab Take 2 tablets (40 mg total) by mouth 2 (two) times daily.    warfarin (COUMADIN) 7.5 MG tablet Take 1.5 tablets (11.25 mg total) by mouth Daily. (Patient taking differently: Take 12 mg by mouth Daily. Total 12 mg daily)    gabapentin (NEURONTIN) 400 MG capsule Take 1 capsule (400 mg total) by mouth 2 (two) times daily. For chronic heel/foot pain         Social History:     Social History   Substance Use Topics    Smoking status: Never Smoker    Smokeless tobacco: Never Used    Alcohol use No     Family History:     Family History   Problem Relation Age of Onset    Hypertension Mother     Stroke Mother     Hypertension Father     Alcohol abuse Father     Hypertension Sister     Hypertension Brother      Physical Exam:     Vitals:  Temp:  [97.5 °F (36.4 °C)]   Pulse:  [68]   Resp:  [18]   BP: (154)/(77)   SpO2:  [95 %]  I/O's:  No intake or output data in the 24 hours ending 10/30/17 8972     Constitutional: NAD  HEENT: Sclera anicteric, PERRLA, EOMI  Neck: Mild JVD  CV: RRR, no m/r/g, normal S1/S2  Pulm: CTAB, no wheezes, rales, or ronchi  GI: Abdomen soft, NTND, +BS  Extremities: 2+ BLE edema to the knees      Labs:       Recent Labs  Lab  10/27/17  1330 10/28/17  0349 10/30/17  2157    139 141   K 3.8 3.8 4.3   CL 99 97 101   CO2 32* 33* 29   BUN 21* 22* 21*   CREATININE 1.6* 1.5* 1.2   ANIONGAP 9 9 11       Recent Labs  Lab 10/27/17  0348 10/28/17  0349 10/30/17  2157   AST 16 16 24   ALT 8* 7* 10   ALKPHOS 134 135 152*   BILITOT 1.6* 1.0 2.7*   ALBUMIN 2.7* 2.9* 3.2*       Recent Labs  Lab 10/27/17  1330 10/28/17  0349 10/30/17  2157   CALCIUM 9.3 9.6 9.6   MG  --  1.7 1.4*   PHOS  --  3.9 2.5*       Recent Labs  Lab 10/25/17  2305 10/26/17  0744 10/30/17  2050   TROPONINI 0.062* 0.040* 0.054*   *  --  207*       Recent Labs  Lab 10/25/17  2342   NITRITE Negative   LEUKOCYTESUR Negative   WBCUA 5   BACTERIA Rare       Estimated Creatinine Clearance: 90.5 mL/min (based on SCr of 1.2 mg/dL).   Recent Labs  Lab 10/27/17  0348 10/28/17  0349 10/30/17  2050   WBC 5.43 5.84 5.39   HGB 10.7* 10.9* 12.0*   HCT 33.9* 34.7* 38.0*    210 201   GRAN 47.5  2.6 44.2  2.6 54.2  2.9       Recent Labs  Lab 10/28/17  0349   INR 2.3*     No results for input(s): LACTATE in the last 168 hours.  Lab Results   Component Value Date    CHOL 197 10/16/2015    HDL 34 (L) 10/16/2015    LDLCALC 137.4 10/16/2015    TRIG 128 10/16/2015     Lab Results   Component Value Date    HGBA1C 6.4 (H) 10/17/2015     Lab Results   Component Value Date    TSH 1.631 12/09/2016            Imaging:   CXR: 10/30/17  Impression         Findings suggesting CHF pattern with right greater than left pleural effusions, grossly similar to 10/25/17.         EF   Date Value Ref Range Status   10/26/2017 33 (A) 55 - 65    04/02/2017 25 (A) 55 - 65    06/01/2016 35 (A) 55 - 65    02/15/2016 35 (A) 55 - 65    10/16/2015 35 (A) 55 - 65        EKG: aFib with TWI in the lateral leads      Assessment and Plan:   #ADHF - Patient with mild heart failure exacerbation with JVD and significant lower extremity edema. A majority of the edema is likely chronic venostasis. Elevated bilirubin and  continued symptoms  - Would admit to Internal Medicine observation for gentle IV diuresis and symptoms control    #Non-Cardiac chest Pain - Troponin at baseline for the patient. Pain sharp and reproducible. Blurry vision seen by optho and determined to be related to myopia with astigmatism. No evidence of ADHF at this time. Creatinine returned to normal  - If pain persists, can consider outpatient stress testing (nuclear considering EF) if patient amenable  - Would not check further troponins      Signed:    Matt Pacheco MD  Chief Cardiology Fellow  Pager: [phone]  10/30/2017 10:34 PM

## 2017-10-31 NOTE — PT/OT/SLP EVAL
Occupational Therapy  Evaluation/ Treatment/ Discharge Summary    Hamlet Terrell   MRN: [id number]   Admitting Diagnosis: Acute on chronic systolic CHF (congestive heart failure)    OT Date of Treatment: 10/31/17   OT Start Time: 0845  OT Stop Time: 0916  OT Total Time (min): 31 min    Billable Minutes:  Evaluation 20  Self Care/Home Management 11    Diagnosis: Acute on chronic systolic CHF (congestive heart failure)       Past Medical History:   Diagnosis Date    Anticoagulant long-term use     Cardiomegaly     CHF (congestive heart failure)     Chronic combined systolic and diastolic congestive heart failure     Coronary artery disease     CVA (cerebrovascular accident)     Heart attack 2006    Hypertension     Hyperthyroidism, subclinical 1/2/2013    MI (myocardial infarction) 9/22/2013    MI in 2009      Paroxysmal atrial fibrillation     PE (pulmonary embolism) 1/1/2013    IN 2010     S/P ablation of atrial flutter 2008    Stroke 2009    no residual weaknesses      Past Surgical History:   Procedure Laterality Date    RADIOFREQUENCY ABLATION  01/08/2008    for atrial flutter       Referring physician: AMBROSE Ulloa MD  Date referred to OT: 10/31/17    General Precautions: Standard, fall  Orthopedic Precautions: N/A  Braces: N/A    Do you have any cultural, spiritual, Mormonism conflicts, given your current situation?: None stated     Patient History:  Living Environment  Lives With: sibling(s) (brother)  Living Arrangements: house  Home Accessibility: stairs to enter home  Home Layout: Able to live on 1st floor  Number of Stairs to Enter Home: 3  Stair Railings at Home: none  Transportation Available: car, family or friend will provide  Living Environment Comment: Pt lives in single story home with 3 ANAID and was (I) PTA. Pt has tub shower and walk in shower but prefers wal in shower with no DME. Pt was (I) in all aspects PTA but reports that his endurance has been poor limiting his  activity.  Equipment Currently Used at Home: none    Prior level of function:   Bed Mobility/Transfers: independent  Grooming: independent  Bathing: independent  Upper Body Dressing: independent  Lower Body Dressing: independent  Toileting: independent  Home Management Skills: independent  Driving License: Yes  Mode of Transportation: Car, Family  Occupation: On disability     Dominant hand: right    Subjective:  Communicated with nurse prior to session.    Chief Complaint: Pt indicating that he has been getting tired quickly for a while, but it's   better now that fluid has been removed.  Patient/Family stated goals: Pt wants to return to PLOF.    Pain/Comfort  Pain Rating 1: 0/10  Pain Rating Post-Intervention 1: 0/10    Objective:  Patient found with: peripheral IV, telemetry    Cognitive Exam:  Oriented to: Person, Place, Time and Situation  Follows Commands/attention: Follows multistep  commands  Communication: clear/fluent  Memory:  No Deficits noted  Safety awareness/insight to disability: intact  Coping skills/emotional control: Appropriate to situation    Visual/perceptual:  Intact    Physical Exam:  Postural examination/scapula alignment: No postural abnormalities identified  Skin integrity: Visible skin intact  Edema: None noted (B) UEs    Sensation:   Intact    Upper Extremity Range of Motion:  Right Upper Extremity: WFL  Left Upper Extremity: WFL    Upper Extremity Strength:  Right Upper Extremity: WFL  Left Upper Extremity: WFL   Strength: WFLS    Fine motor coordination:   Intact    Gross motor coordination: WFL    Functional Mobility:  Bed Mobility:  Rolling/Turning to Left: Modified independent  Rolling/Turning Right: Modified independent  Scooting/Bridging: Modified Independent  Supine to Sit: Modified Independent  Sit to Supine: Modified Independent    Transfers:  Sit <> Stand Assistance: Modified Independent (with Pt managing IV pole when ambulating to restroom.)  Sit <> Stand Assistive  "Device: No Assistive Device  Bed <> Chair Technique: Stand Pivot  Bed <> Chair Transfer Assistance: Modified Independent  Toilet Transfer Technique: Stand Pivot  Toilet Transfer Assistance: Modified Independent  Toilet Transfer Assistive Device: No Assistive Device    Functional Ambulation: Pt ambulated from EOB to restroom 10 ft, performed toilet transfer and then back 10 ft to EOB managing IV pole without assist.    Activities of Daily Living:  Feeding Level of Assistance: Activity did not occur    UE Dressing Level of Assistance: Set-up Assistance (performed in standing with no assist provided.)    LE Dressing Level of Assistance: Set-up Assistance (withn no assist after set up.  Performed sitting EOB shorts and socks.)    Grooming Position: Standing at sink  Grooming Level of Assistance: Modified independent  Toileting Where Assessed:  (Not observed but Pt indicating that he hsa beed toileting without assist.)      Balance:   Static Sit: NORMAL: No deviations seen in posture held statically  Dynamic Sit: NORMAL: No deviations seen in posture held dynamically  Static Stand: GOOD+: Takes MAXIMAL challenges from all directions  Dynamic stand: GOOD+: Independent gait (with or without assistive device)      -PAC 6 CLICK ADL  How much help from another person does this patient currently need?  1 = Unable, Total/Dependent Assistance  2 = A lot, Maximum/Moderate Assistance  3 = A little, Minimum/Contact Guard/Supervision  4 = None, Modified [place]/Independent    Putting on and taking off regular lower body clothing? : 4  Bathing (including washing, rinsing, drying)?: 3  Toileting, which includes using toilet, bedpan, or urinal? : 4  Putting on and taking off regular upper body clothing?: 4  Taking care of personal grooming such as brushing teeth?: 4  Eating meals?: 4  Total Score: 23    -PAC Raw Score CMS "G-Code Modifier Level of Impairment Assistance   6 % Total / Unable   7 - 9 CM 80 - 100% Maximal " Assist   10-14 CL 60 - 80% Moderate Assist   15 - 19 CK 40 - 60% Moderate Assist   20 - 22 CJ 20 - 40% Minimal Assist   23 CI 1-20% SBA / CGA   24 CH 0% Independent/ Mod I       Patient left HOB elevated with all lines intact, call button in reach and nurse notified    Assessment:  Hamlet Terrell is a 52 y.o. male with a medical diagnosis of Acute on chronic systolic CHF (congestive heart failure) . Pt is currently performing ADLs, functional mobility & t/fs at baseline and displays age-appropriate strength, and  balance. OT services are not recommended at this time and patient is safe to D/C home.      Rehab identified problem list/impairments: Rehab identified problem list/impairments: impaired endurance    Activity tolerance: Good    Discharge recommendations: Discharge Facility/Level Of Care Needs: home     Barriers to discharge: Barriers to Discharge: Decreased caregiver support    Equipment recommendations: none     GOALS:    Occupational Therapy Goals     Not on file          Multidisciplinary Problems (Resolved)        Problem: Occupational Therapy Goal    Goal Priority Disciplines Outcome Interventions   Occupational Therapy Goal   (Resolved)     OT, PT/OT Outcome(s) achieved    Description:  Pt is currently performing ADLs, functional mobility & t/fs at baseline and displays age-appropriate strength, and balance. OT services are not recommended at this time and patient is safe to D/C home.                    PLAN:   (No IPOT required)   (D/C from IPOT secondary to no current OT needs.)  Plan of Care expires: 10/31/17  Plan of Care reviewed with: patient         Oziel HAMPTON All, OTR/L  10/31/2017

## 2017-10-31 NOTE — ED PROVIDER NOTES
"Encounter Date: 10/30/2017       History     Chief Complaint   Patient presents with    Chest Pain     just dc'd on friday for same thing chest pain, blurry vision and sob again, hx chf    Shortness of Breath     50YO AA M w/ Hx HFrEF (EF 30-35% as of 10/2617), HTN, CAD, CVA w/o residual deficits, AFib on coumadin presenting with chest pain and shortness of breath. Pt states he was just discharged 10/27 after 2 day admission for HF exacerbation and chest pain. He states that throughout the weekend the chest pain has not improved and his SOB remains. CP is central, sharp "stabbing" and worsens with movement and deep breathing, no radiation. CP has been present since Tuesday of last week, and pt states that from the night of discharge it has been constantly present. He denies change in character or quality from the CP of admission. He states he took 3 SNL tonight and experienced no improvement, so he came to the ED. He endorses edema bilaterally to the knee which has not improved despite taking a new higher dose of diuretic. He denies any dizziness, diaphoresis, palpitations, nausea, arm/jaw pain/numbness/tingling, chest trauma, cough, fever, chills, HA, abdominal pain, or trauma.                Review of patient's allergies indicates:   Allergen Reactions    Acetaminophen      Itching    Oxycodone-acetaminophen      Other reaction(s): Itching    Ace inhibitors Other (See Comments)     cough     Past Medical History:   Diagnosis Date    Anticoagulant long-term use     Cardiomegaly     CHF (congestive heart failure)     Chronic combined systolic and diastolic congestive heart failure     Coronary artery disease     CVA (cerebrovascular accident)     Heart attack 2006    Hypertension     Hyperthyroidism, subclinical 1/2/2013    MI (myocardial infarction) 9/22/2013    MI in 2009      Paroxysmal atrial fibrillation     PE (pulmonary embolism) 1/1/2013    IN 2010     S/P ablation of atrial flutter 2008 "    Stroke 2009    no residual weaknesses     Past Surgical History:   Procedure Laterality Date    RADIOFREQUENCY ABLATION  01/08/2008    for atrial flutter     Family History   Problem Relation Age of Onset    Hypertension Mother     Stroke Mother     Hypertension Father     Alcohol abuse Father     Hypertension Sister     Hypertension Brother      Social History   Substance Use Topics    Smoking status: Never Smoker    Smokeless tobacco: Never Used    Alcohol use No     Review of Systems   Constitutional: Negative for chills and fever.   HENT: Negative for congestion and rhinorrhea.    Respiratory: Positive for shortness of breath. Negative for chest tightness.    Cardiovascular: Positive for chest pain and leg swelling. Negative for palpitations.   Gastrointestinal: Negative for abdominal pain and nausea.   Genitourinary: Negative for dysuria and frequency.   Musculoskeletal: Negative for arthralgias and myalgias.   Skin: Negative for color change and pallor.   Neurological: Negative for dizziness and headaches.   Psychiatric/Behavioral: Negative for agitation and confusion.   All other systems reviewed and are negative.      Physical Exam     Initial Vitals [10/30/17 1741]   BP Pulse Resp Temp SpO2   (!) 154/77 68 18 97.5 °F (36.4 °C) 95 %      MAP       102.67         Physical Exam    Nursing note and vitals reviewed.  Constitutional: He appears well-developed. No distress.   HENT:   Head: Normocephalic and atraumatic.   Eyes: Conjunctivae are normal. Pupils are equal, round, and reactive to light.   Neck: No tracheal deviation present.   Cardiovascular: Normal rate and intact distal pulses. Exam reveals no gallop and no friction rub.    Rate 67 and irregular.   Pulmonary/Chest: Breath sounds normal. No stridor. No respiratory distress. He has no rhonchi. He has no rales. He exhibits tenderness (anterior chest wall).   Abdominal: Soft. He exhibits no distension.   Musculoskeletal: Normal range of  motion. He exhibits edema (bilateral, pitting, to the knee).   Neurological: He is alert and oriented to person, place, and time.   Skin: Skin is warm. No pallor.   Psychiatric: He has a normal mood and affect. His behavior is normal.         ED Course   Procedures  Labs Reviewed   CBC W/ AUTO DIFFERENTIAL - Abnormal; Notable for the following:        Result Value    RBC 4.40 (*)     Hemoglobin 12.0 (*)     Hematocrit 38.0 (*)     MCHC 31.6 (*)     All other components within normal limits   TROPONIN I - Abnormal; Notable for the following:     Troponin I 0.054 (*)     All other components within normal limits   B-TYPE NATRIURETIC PEPTIDE - Abnormal; Notable for the following:      (*)     All other components within normal limits   COMPREHENSIVE METABOLIC PANEL - Abnormal; Notable for the following:     BUN, Bld 21 (*)     Albumin 3.2 (*)     Total Bilirubin 2.7 (*)     Alkaline Phosphatase 152 (*)     All other components within normal limits   MAGNESIUM - Abnormal; Notable for the following:     Magnesium 1.4 (*)     All other components within normal limits   PHOSPHORUS - Abnormal; Notable for the following:     Phosphorus 2.5 (*)     All other components within normal limits   PROTIME-INR - Abnormal; Notable for the following:     Prothrombin Time 16.4 (*)     INR 1.6 (*)     All other components within normal limits   MAGNESIUM   PHOSPHORUS   PROTIME-INR     EKG Readings: (Independently Interpreted)   Afib, lad, no acute st elevation       X-Rays:   Independently Interpreted Readings:   Other Readings:  Cxr:  No acute infiltrate, consolidation or effusion.  Stable congestive changes.           APC / Resident Notes:   HO-2 MDM:  Emergent evaluation of 52M w/ Hx CHF, CAD presenting with ongoing chest pain and SOB w/ MUÑOZ since discharge for admission for heart failure exacerbation 3 days ago, w/ exam on presentation notable for bilateral LE edema, pitting, to the level of the knee, which appears to be near  similar to this recent admission, and otherwise clear lungs and absent JVD. Chest pain itself has been constant since this prior admission, where Tn's were trended, and has not changed in character. DDx includes but is not limited to CHF exacerbation, chronic pulmonary congestion, ACS, MSK pain, GERD. Vitals stable and pt does not appear toxic. Will obtain Tn, BNP, CXR, basic labs. Dispo pending. ASA ordered.   Magdi Becerra M.D.  [facility] Emergency Medicine, PGY-2  10/30/2017 8:40 PM    HO-2 Update:  CMP shows Cr 1.2, CBC stable. BNP near pt's baseline, and Tn elevated although pt has noted similar troponemia during this prior admission. Pt consulted to cardiology for recommendation on diuresis given lack of improvement with current regimen. Cardiology agrees pain does not appear to be cardiac in etiology, but would recommend observation for IV diuresis, trending of values, and repeat stress testing on discharge with follow-up.  Magdi Becerra M.D.  [facility] Emergency Medicine, PGY-2  10/30/2017 11:48 PM    HO-2 Update:  Pt to be admitted for observation for diuresis and trending of Tn, w/ assessment in the am for PO diuretic regimen. Pt hemodynamically stable, agrees and understands plan.  Magdi Becerra M.D.  [facility] Emergency Medicine, PGY-2  10/31/2017 12:17 AM             Attending Attestation:   Physician Attestation Statement for Resident:  As the supervising MD   Physician Attestation Statement: I have personally seen and examined this patient.   I agree with the above history. -:   As the supervising MD I agree with the above PE.    As the supervising MD I agree with the above treatment, course, plan, and disposition.   -: R/o recurrent chf, acs, pneumonia, anemia, electrolyte disorder, other.  Ekg, cxr, labs, monitor, re-eval.  Likely cards consult.    I have reviewed and agree with the residents interpretation of the following: lab data, x-rays and EKG.  I have reviewed the following: old records at this  facility.                    ED Course      Clinical Impression:   The primary encounter diagnosis was Chest pain. Diagnoses of Essential hypertension, Dilated cardiomyopathy, and Congestive heart failure, unspecified congestive heart failure chronicity, unspecified congestive heart failure type were also pertinent to this visit.                           Magdi Burleson MD  Resident  10/31/17 0017       Tru Gonzalez MD  10/31/17 0329

## 2017-10-31 NOTE — ASSESSMENT & PLAN NOTE
EF 33% on echocardiogram obtained during previous admission.  Home torsemide dose was increased from 20mg BID to 40mg BID without increase in diuresis.    -Will start Lasix gtt with metolazone  -Continue Coreg, Imdur, and losartan   -Strict I &O's

## 2017-10-31 NOTE — ASSESSMENT & PLAN NOTE
Patient was evaluated by ophthalmology during previous admission for blurred vision related to myopia with astigmatism.    -Will need outpatient follow up with personal ophthalmology

## 2017-10-31 NOTE — PROGRESS NOTES
Mr. Terrell was previously enrolled in Digital Medicine Heart Failure several times. He is difficult to manage due to continued non-compliance. Pt has been advised during previous admits & enrollments that repeated non-compliance / lack of total participation in program would cause him to be ineligible for the program going forward per the decision of the DMHF team. Pt is not a candidate for DMHFP.    Removed from hf list.

## 2017-10-31 NOTE — ASSESSMENT & PLAN NOTE
Chest pain reproducible with palpation.  EKG unchanged from previous studies.    -Likely represents continued costochondritis   -Will restart NSAID with oxycodone for breakthrough pain.

## 2017-10-31 NOTE — ED TRIAGE NOTES
Pt states that he was recently discharged after being admitted for substernal chest pain. Pt states that the pain has not receded since d/c. Pt describes as sharp substernal pain and rates as 8 out of 10. Pt states he thinks he was d/c to soon. Pt also with swelling to lower ext for about week.

## 2017-10-31 NOTE — HPI
Hamlet Terrell is a 52 y.o. male with HFrEF (33%), HTN, CAD, CVA/cerebral embolism, paroxysmal a-fib,  PE, CKD, TAPAN who presents to [facility] ED left parasternal chest pain, orthopnea, PND and bilateral lower extremity edema.  He reports that since he has been discharged he has had orthopnea forcing him to sleep in a chair and continues to have a sharp parasternal chest pain that is reproducible with palpation.  He was recently discharged 10/27/2017 for the same complaint.  During that admission he was started on a lasix gtt and was transitioned to IV pushes.  Upon discharged he was started on double the dose of his home torsemide.  On that particular dose he reports that he has not had an increase in his urine output and continues to retain fluid.  For his chest pain he reports taking SL nitro with some relief.  During his previous admission his pain was controlled with NSAIDs.   Upon arrival in the ED patient had an EKG performed that showed rate controlled a-fib unchanged from previous studies.  CXR demonstrating pulmonary venous congestion indicative of fluid overload.  He was given a dose of Lasix 80mg IV.  Cardiology was consulted who recommends gentle IV diuresis and pain control.

## 2017-11-01 VITALS
DIASTOLIC BLOOD PRESSURE: 52 MMHG | BODY MASS INDEX: 36.51 KG/M2 | OXYGEN SATURATION: 95 % | WEIGHT: 246.5 LBS | RESPIRATION RATE: 18 BRPM | TEMPERATURE: 99 F | HEART RATE: 73 BPM | HEIGHT: 69 IN | SYSTOLIC BLOOD PRESSURE: 115 MMHG

## 2017-11-01 LAB
ANION GAP SERPL CALC-SCNC: 10 MMOL/L
BUN SERPL-MCNC: 29 MG/DL
CALCIUM SERPL-MCNC: 9.6 MG/DL
CHLORIDE SERPL-SCNC: 96 MMOL/L
CO2 SERPL-SCNC: 34 MMOL/L
CREAT SERPL-MCNC: 1.4 MG/DL
EST. GFR  (AFRICAN AMERICAN): >60 ML/MIN/1.73 M^2
EST. GFR  (NON AFRICAN AMERICAN): 57.8 ML/MIN/1.73 M^2
GLUCOSE SERPL-MCNC: 120 MG/DL
MAGNESIUM SERPL-MCNC: 1.5 MG/DL
POTASSIUM SERPL-SCNC: 3.4 MMOL/L
SODIUM SERPL-SCNC: 140 MMOL/L

## 2017-11-01 PROCEDURE — 99239 HOSP IP/OBS DSCHRG MGMT >30: CPT | Mod: ,,, | Performed by: HOSPITALIST

## 2017-11-01 PROCEDURE — 36415 COLL VENOUS BLD VENIPUNCTURE: CPT

## 2017-11-01 PROCEDURE — 80048 BASIC METABOLIC PNL TOTAL CA: CPT

## 2017-11-01 PROCEDURE — 63600175 PHARM REV CODE 636 W HCPCS: Performed by: STUDENT IN AN ORGANIZED HEALTH CARE EDUCATION/TRAINING PROGRAM

## 2017-11-01 PROCEDURE — 25000003 PHARM REV CODE 250: Performed by: STUDENT IN AN ORGANIZED HEALTH CARE EDUCATION/TRAINING PROGRAM

## 2017-11-01 PROCEDURE — 83735 ASSAY OF MAGNESIUM: CPT

## 2017-11-01 PROCEDURE — 25000003 PHARM REV CODE 250: Performed by: SURGERY

## 2017-11-01 PROCEDURE — 25000003 PHARM REV CODE 250: Performed by: INTERNAL MEDICINE

## 2017-11-01 RX ORDER — POTASSIUM CHLORIDE 20 MEQ/1
40 TABLET, EXTENDED RELEASE ORAL DAILY
Status: DISCONTINUED | OUTPATIENT
Start: 2017-11-01 | End: 2017-11-01 | Stop reason: HOSPADM

## 2017-11-01 RX ORDER — LANOLIN ALCOHOL/MO/W.PET/CERES
400 CREAM (GRAM) TOPICAL ONCE
Status: DISCONTINUED | OUTPATIENT
Start: 2017-11-01 | End: 2017-11-01 | Stop reason: HOSPADM

## 2017-11-01 RX ORDER — NAPROXEN 250 MG/1
250 TABLET ORAL ONCE AS NEEDED
Status: COMPLETED | OUTPATIENT
Start: 2017-11-01 | End: 2017-11-01

## 2017-11-01 RX ORDER — NITROGLYCERIN 0.4 MG/1
0.4 TABLET SUBLINGUAL EVERY 5 MIN PRN
Qty: 30 TABLET | Refills: 11 | Status: SHIPPED | OUTPATIENT
Start: 2017-11-01

## 2017-11-01 RX ORDER — METOLAZONE 2.5 MG/1
2.5 TABLET ORAL DAILY
Qty: 90 TABLET | Refills: 3 | Status: SHIPPED | OUTPATIENT
Start: 2017-11-02 | End: 2019-01-31

## 2017-11-01 RX ORDER — METHOCARBAMOL 500 MG/1
500 TABLET, FILM COATED ORAL 3 TIMES DAILY
Qty: 30 TABLET | Refills: 0 | Status: SHIPPED | OUTPATIENT
Start: 2017-11-01 | End: 2017-11-11

## 2017-11-01 RX ORDER — TORSEMIDE 20 MG/1
40 TABLET ORAL 2 TIMES DAILY
Qty: 120 TABLET | Refills: 2 | Status: ON HOLD
Start: 2017-11-01 | End: 2019-04-16

## 2017-11-01 RX ORDER — IBUPROFEN 400 MG/1
400 TABLET ORAL EVERY 6 HOURS PRN
Status: DISCONTINUED | OUTPATIENT
Start: 2017-11-01 | End: 2017-11-01

## 2017-11-01 RX ORDER — LIDOCAINE 50 MG/G
1 PATCH TOPICAL DAILY
Qty: 15 PATCH | Refills: 1 | Status: SHIPPED | OUTPATIENT
Start: 2017-11-01 | End: 2018-03-31 | Stop reason: ALTCHOICE

## 2017-11-01 RX ADMIN — LOSARTAN POTASSIUM 12.5 MG: 25 TABLET, FILM COATED ORAL at 09:11

## 2017-11-01 RX ADMIN — CARVEDILOL 25 MG: 25 TABLET, FILM COATED ORAL at 09:11

## 2017-11-01 RX ADMIN — NAPROXEN 250 MG: 250 TABLET ORAL at 02:11

## 2017-11-01 RX ADMIN — METOLAZONE 2.5 MG: 2.5 TABLET ORAL at 09:11

## 2017-11-01 RX ADMIN — ASPIRIN 81 MG: 81 TABLET, COATED ORAL at 09:11

## 2017-11-01 RX ADMIN — LIDOCAINE 1 PATCH: 50 PATCH TOPICAL at 02:11

## 2017-11-01 RX ADMIN — POTASSIUM CHLORIDE 40 MEQ: 1500 TABLET, EXTENDED RELEASE ORAL at 09:11

## 2017-11-01 RX ADMIN — TORSEMIDE 40 MG: 20 TABLET ORAL at 09:11

## 2017-11-01 RX ADMIN — METHOCARBAMOL 500 MG: 500 TABLET ORAL at 12:11

## 2017-11-01 NOTE — PLAN OF CARE
"   10/31/17 1134   Readmission Questionnaire   At the time of your discharge, did someone talk to you about what your health problems were? Yes   At the time of discharge, did someone talk to you about what to watch out for regarding worsening of your health problem? Yes   At the time of discharge, did someone talk to you about what to do if you experienced worsening of your health problem? Yes   What do you believe caused you to be sick enough to be re-admitted? "DISCHARGED TOO EARLY"     "
MODESTA spoke to KESHA SORIANO.  Pt may need a priority clinic appt, after he is discharged.  MODESTA spoke to CM who reminded MODESTA that [facility] 2-3 weeks.  CM will schedule pt with her PCP for follow up, or the soonest appt pt can get.  MODESTA updated KESHA SORIANO.      Cynthia Mondragon LCSW     [phone]  Critical Care (MICU)    
PCP YUNG GRADY  BROTHER SEYMOUR BARRERA      10/31/17 1137   Discharge Assessment   Assessment Type Discharge Planning Assessment   Confirmed/corrected address and phone number on facesheet? Yes   Assessment information obtained from? Patient   Expected Length of Stay (days) 3   Communicated expected length of stay with patient/caregiver no   Prior to hospitilization cognitive status: No Deficits   Prior to hospitalization functional status: Independent   Current cognitive status: No Deficits   Current Functional Status: Independent   Lives With alone   Able to Return to Prior Arrangements yes   Is patient able to care for self after discharge? Unable to determine at this time (comments)   Readmission Within The Last 30 Days previous discharge plan unsuccessful   If yes, most recent facility name: [facility]   Patient currently being followed by outpatient case management? No   Patient currently receives any other outside agency services? No   Equipment Currently Used at Home none   Do you have any problems affording any of your prescribed medications? No   Is the patient taking medications as prescribed? yes   Does the patient have transportation home? Yes   Transportation Available family or friend will provide   Does the patient receive services at the [facility]madin Clinic? No   Discharge Plan A Home   Discharge Plan B Home     
[facility] next available not for 2 weeks. Attempted to schedule f/u appt. with pt's PCP DR. Carlin Swift at [phone]. VM full. Spoke with pt. and explained importance of f/u d/c appt. within 5-7 days after d/c. Pt. Verbalized understanding. Pt. states he will make f.u appt. With Dr. Swift. KAREEM Granger RN/CM  
Problem: Occupational Therapy Goal  Goal: Occupational Therapy Goal  Pt is currently performing ADLs, functional mobility & t/fs at baseline and displays age-appropriate strength, and balance. OT services are not recommended at this time and patient is safe to D/C home.  Outcome: Outcome(s) achieved Date Met: 10/31/17  CHRISTINA Solitario/ZULMA      10/31/2017            
Problem: Patient Care Overview  Goal: Plan of Care Review  Outcome: Ongoing (interventions implemented as appropriate)  POC reviewed with pt at 1400. IV lasix d/elba.  Pt verbalized understanding. Questions and concerns addressed. No acute events today. Pt progressing toward goals. Will continue to monitor. See flowsheets for full assessment and VS info.         
Problem: Patient Care Overview  Goal: Plan of Care Review  Outcome: Ongoing (interventions implemented as appropriate)  POC was reviewed with pt. Pt c/o newly onset pain to Lt inner thigh. New order for Lidocaine 5% patch Q24hr was made and carried out with effect. Kept comfortable. Pt refused heparin injection and lab in am. Explained the risk of not having the treatment and pt understood.       
Problem: Patient Care Overview  Goal: Plan of Care Review  Outcome: Ongoing (interventions implemented as appropriate)  Pt free from falls. Pt wears non slip socks when ambulating. Pt bed low and locked position. Pt afebrile shift. Pt IV site without redness or edema.  Pain to chest is tolerable at this moment.      
Problem: Patient Care Overview  Goal: Plan of Care Review  Outcome: Outcome(s) achieved Date Met: 11/01/17  POC reviewed with pt 1400. Diuretics and pain  Management. Pt verbalized understanding. Questions and concerns addressed. No acute events today. Pt progressing toward goals. Will continue to monitor. See flowsheets for full assessment and VS info.         
Spoke with pt. about appt. to [facility]. Pt. Is not interested in going there for care. Wants to continue seeing Dr. GRADY. States he can walk in and be seen. Stressed importance of f/u care. KAREEM Granger RN/CM  
07-Feb-2017 19:15

## 2017-11-01 NOTE — PROGRESS NOTES
Pt refused to allow ADT nurse to go over d/c instructions.PIV removed with catheter intact. Pt will notify nurse when ready for transport

## 2017-11-01 NOTE — DISCHARGE SUMMARY
[facility] Hospital Medicine  Discharge Summary      Patient Name: Hamlet Terrell  MRN: [id number]  Admission Date: 10/30/2017  Hospital Length of Stay: 1 days  Discharge Date and Time:  11/01/2017 5:54 PM  Attending Physician: No att. providers found   Discharging Provider: Alexandro Hale MD  Primary Care Provider: Carlin Swift MD  Hospital Medicine Team: [facility] HOSP MED 3 Alexandro Hale MD    HPI:   Hamlet Terrell is a 52 y.o. male with HFrEF (33%), HTN, CAD, CVA/cerebral embolism, paroxysmal a-fib,  PE, CKD, TAPAN who presents to [facility] ED left parasternal chest pain, orthopnea, PND and bilateral lower extremity edema.  He reports that since he has been discharged he has had orthopnea forcing him to sleep in a chair and continues to have a sharp parasternal chest pain that is reproducible with palpation.  He was recently discharged 10/27/2017 for the same complaint.  During that admission he was started on a lasix gtt and was transitioned to IV pushes.  Upon discharged he was started on double the dose of his home torsemide.  On that particular dose he reports that he has not had an increase in his urine output and continues to retain fluid.  For his chest pain he reports taking SL nitro with some relief.  During his previous admission his pain was controlled with NSAIDs.   Upon arrival in the ED patient had an EKG performed that showed rate controlled a-fib unchanged from previous studies.  CXR demonstrating pulmonary venous congestion indicative of fluid overload.  He was given a dose of Lasix 80mg IV.  Cardiology was consulted who recommends gentle IV diuresis and pain control.      Hospital Course:   Pt was started on Lasix drip overnight on the day of admission and transitioned to metolazone torsemide oral regimen the next day. 5600 in total output over hospital course. Will be discharged on home torsemide with metolazone for added diuresis.     Patient still having reproducible CP, likely  chostochondritis. Also complained of pain in his left medial thigh overnight the night prior to discharge. US performed was negative for DVT.     His INR on both admissions was around 1.6. He will require follow up to monitor his levels.      Review of Systems   Constitutional: Negative for chills, diaphoresis, fatigue and fever.   HENT: No rhinorrhea or congestion  Eyes: Positive for visual disturbance. No photophobia  Respiratory: Negative for SOB. Negative for cough  Cardiovascular: Positive for chest pain and leg swelling.   Gastrointestinal: Negative for diarrhea and constipation  Genitourinary: Negative for dysuria and hematuria.   Musculoskeletal: Positive for arthralgias. Left leg pain  Skin: No rashes or erythema.   Neurological: Negative for dizziness, weakness, light-headedness and headaches.      Objective:      Vitals:    11/01/17 1100   BP: 115/52   Pulse: 73   Resp: 18   Temp: 98.5       Physical Exam   Constitutional: He is oriented to person, place, and time. He appears well-developed and well-nourished. He is cooperative. No distress.   Eyes: Conjunctivae and EOM are normal. Pupils are equal, round, and reactive to light. No obvious nystagmus. Mild clouding of corneas bilaterally.   Cardiovascular: Normal rate, S1 normal and S2 normal.  An irregularly irregular rhythm present.  No murmur heard.  Pulmonary/Chest: Effort normal and breath sounds normal.   Abdominal: Soft. Bowel sounds are normal. There is no tenderness. There is no rebound and no guarding.   Musculoskeletal: He exhibits tense lower extremity edema bilaterally, 3+ to knee bilaterally with overlying trophic changes suggesting chronic edema. Improved from previous exams  Tenderness to left medial leg with no erythema, fluctuance or induration.   Chest wall ttp.   Neurological: He is alert and oriented to person, place, and time.   Skin: Skin is warm, dry and intact. No rash noted. No cyanosis. Nails show no clubbing.   Psychiatric: He  has a normal mood and affect. His speech is normal and behavior is normal.      Consults:   Consults         Status Ordering Provider     Inpatient consult to Cardiology  Once     Provider:  (Not yet assigned)    GAURANG Mock          * Acute on chronic systolic CHF (congestive heart failure)    EF 33% on echocardiogram obtained during previous admission.  Home torsemide dose was increased from 20mg BID to 40mg BID without increase in diuresis.    -Started Lasix gtt with metolazone  -Continued Coreg, Imdur, and losartan   -Transitioned back to oral with metolazone supplement and will discharge on this regimen.           Chest pain    Chest pain reproducible with palpation.  EKG unchanged from previous studies.    -Likely represents continued costochondritis   -JEAN-PIERRE during admission so NSAIDs held, given robaxin          JEAN-PIERRE (acute kidney injury)    Cr elevated to 1.7 yesterday, resolving today.           Coronary artery disease    Continue aspirin 81mg daily, consider starting statin therapy at clinic visit.   History of liver disease and unknown reaction to statin in past on chart review, follow up with PCP for management          Essential hypertension    Continue Isosorbide dinitrate, Coreg, losartan          Atrial fibrillation, chronic    WIll continue rate control with carvedilol 25 BID  Warfarin for anticoagulation, follow up in clinic for monitoring.             Final Active Diagnoses:    Diagnosis Date Noted POA    PRINCIPAL PROBLEM:  Acute on chronic systolic CHF (congestive heart failure) [I50.23] 05/09/2015 Yes    Chest pain [R07.9] 10/31/2017 Yes    JEAN-PIERRE (acute kidney injury) [N17.9] 06/03/2016 Yes    Coronary artery disease [I25.10] 05/31/2016 Yes     Chronic    Atrial fibrillation, chronic [I48.2] 01/02/2013 Yes     Chronic    Essential hypertension [I10] 01/02/2013 Yes     Chronic      Problems Resolved During this Admission:    Diagnosis Date Noted Date Resolved POA        Discharged Condition: stable    Disposition: Home or Self Care    Follow Up:  Follow-up Information     Carlin Swift MD In 1 week.    Specialty:  Family Medicine  Why:  post-hospitalization follow-up  Contact information:  [address]  [place] [address] [phone]                 Patient Instructions:     Diet general   Order Specific Question Answer Comments   Additional restrictions: Cardiac (Low Na/Chol)      Activity as tolerated     Call MD for:  severe uncontrolled pain     Call MD for:  difficulty breathing or increased cough     Call MD for:  persistent dizziness, light-headedness, or visual disturbances         Significant Diagnostic Studies: Labs:   BMP:   Recent Labs  Lab 10/30/17  2157 10/31/17  0524 11/01/17  0723   GLU 79 114* 120*    142 140   K 4.3 3.6 3.4*    97 96   CO2 29 33* 34*   BUN 21* 24* 29*   CREATININE 1.2 1.7* 1.4   CALCIUM 9.6 10.3 9.6   MG 1.4* 1.8 1.5*   , CMP   Recent Labs  Lab 10/30/17  2157 10/31/17  0524 11/01/17  0723    142 140   K 4.3 3.6 3.4*    97 96   CO2 29 33* 34*   GLU 79 114* 120*   BUN 21* 24* 29*   CREATININE 1.2 1.7* 1.4   CALCIUM 9.6 10.3 9.6   PROT 7.9 8.5*  --    ALBUMIN 3.2* 3.5  --    BILITOT 2.7* 2.9*  --    ALKPHOS 152* 172*  --    AST 24 24  --    ALT 10 11  --    ANIONGAP 11 12 10   ESTGFRAFRICA >60.0 52.8* >60.0   EGFRNONAA >60.0 45.7* 57.8*   , CBC   Recent Labs  Lab 10/30/17  2050 10/31/17  0524   WBC 5.39 6.44   HGB 12.0* 12.6*   HCT 38.0* 39.8*    238    and INR   Lab Results   Component Value Date    INR 1.6 (H) 10/30/2017    INR 2.3 (H) 10/28/2017    INR 1.7 (H) 10/27/2017       Pending Diagnostic Studies:     None         Medications:  Reconciled Home Medications:   Discharge Medication List as of 11/1/2017  3:46 PM      START taking these medications    Details   lidocaine (LIDODERM) 5 % Place 1 patch onto the skin once daily. Remove & Discard patch within 12 hours or as directed by MD,  Starting Wed 11/1/2017, Normal      methocarbamol (ROBAXIN) 500 MG Tab Take 1 tablet (500 mg total) by mouth 3 (three) times daily., Starting Wed 11/1/2017, Until Sat 11/11/2017, Normal      metOLazone (ZAROXOLYN) 2.5 MG tablet Take 1 tablet (2.5 mg total) by mouth once daily., Starting Thu 11/2/2017, Normal         CONTINUE these medications which have CHANGED    Details   nitroGLYCERIN (NITROSTAT) 0.4 MG SL tablet Place 1 tablet (0.4 mg total) under the tongue every 5 (five) minutes as needed for Chest pain. Tablet, Sublingual Sublingual, Starting Wed 11/1/2017, Normal      torsemide (DEMADEX) 20 MG Tab Take 2 tablets (40 mg total) by mouth 2 (two) times daily., Starting Wed 11/1/2017, Until Thu 11/1/2018, No Print         CONTINUE these medications which have NOT CHANGED    Details   aspirin (ECOTRIN) 81 MG EC tablet Take 81 mg by mouth once daily., Historical Med      carvedilol (COREG) 25 MG tablet Take 25 mg by mouth 2 (two) times daily., Historical Med      gabapentin (NEURONTIN) 400 MG capsule Take 1 capsule (400 mg total) by mouth 2 (two) times daily. For chronic heel/foot pain, Starting 4/6/2017, Until Fri 4/6/18, Normal      isosorbide dinitrate (ISORDIL) 20 MG tablet Take 1 tablet (20 mg total) by mouth 3 (three) times daily., Starting 4/6/2017, Until Fri 4/6/18, Normal      losartan (COZAAR) 25 MG tablet Take 0.5 tablets (12.5 mg total) by mouth once daily., Starting 4/6/2017, Until Fri 4/6/18, Normal      warfarin (COUMADIN) 7.5 MG tablet Take 1.5 tablets (11.25 mg total) by mouth Daily., Starting 4/6/2017, Until Fri 4/6/18, Normal             Indwelling Lines/Drains at time of discharge:   Lines/Drains/Airways          No matching active lines, drains, or airways          Time spent on the discharge of patient: 30 minutes  Patient was seen and examined on the date of discharge and determined to be suitable for discharge.         Alexandro Hale MD   Internal Medicine PGY-1  [phone]

## 2017-11-01 NOTE — HOSPITAL COURSE
Pt was started on Lasix drip overnight on the day of admission and transitioned to metolazone torsemide oral regimen the next day. 5600 in total output over hospital course. Will be discharged on home torsemide with metolazone for added diuresis.     Patient still having reproducible CP, likely chostochondritis. Also complained of pain in his left medial thigh overnight the night prior to discharge. US performed was negative for DVT.     His INR on both admissions was around 1.6. He will require follow up to monitor his levels.

## 2017-11-01 NOTE — ASSESSMENT & PLAN NOTE
WIll continue rate control with carvedilol 25 BID  Warfarin for anticoagulation, follow up in clinic for monitoring.

## 2017-11-01 NOTE — ASSESSMENT & PLAN NOTE
EF 33% on echocardiogram obtained during previous admission.  Home torsemide dose was increased from 20mg BID to 40mg BID without increase in diuresis.    -Started Lasix gtt with metolazone  -Continued Coreg, Imdur, and losartan   -Transitioned back to oral with metolazone supplement and will discharge on this regimen.

## 2017-11-01 NOTE — PT/OT/SLP PROGRESS
Physical Therapy      Hamlet Terrell  MRN: [id number]    Patient not seen today secondary to patient refusal, not wanting to participate and insisting that he does not need therapy. PT to consult OT note from day before, OT recommends patient d/c home w/ no needs. Will not follow up.    Lalit Bush, PT   11/1/2017

## 2017-11-01 NOTE — ASSESSMENT & PLAN NOTE
Chest pain reproducible with palpation.  EKG unchanged from previous studies.    -Likely represents continued costochondritis   -JEAN-PIERRE during admission so NSAIDs held, given robaxin

## 2017-11-01 NOTE — PROGRESS NOTES
10PM Pt c/o strong pain to Lt inner thigh. Without any available prn medication for pain, Med 3 on-call MD made aware. Ultrasound for r/o DVT and lidocaine patch were ordered and carried out.   3AM Ultra sound result showed negative DVT, and lidocaine patch had good effect on pain to Lt inner thigh. Pt denied pain.

## 2017-11-01 NOTE — ASSESSMENT & PLAN NOTE
Continue aspirin 81mg daily, consider starting statin therapy at clinic visit.   History of liver disease and unknown reaction to statin in past on chart review, follow up with PCP for management

## 2017-11-02 ENCOUNTER — PATIENT OUTREACH (OUTPATIENT)
Dept: ADMINISTRATIVE | Facility: CLINIC | Age: 52
End: 2017-11-02

## 2017-11-02 NOTE — PATIENT INSTRUCTIONS

## 2017-11-02 NOTE — PROGRESS NOTES
Please forward this important TCC information to your provider in order to maximize the post discharge care delivery of this patient.    C3 nurse spoke with Hamlet Terrell  for a TCC post hospital discharge follow up call. The patient does not have a scheduled HOSFU appointment with Hamlet Terrell  within 7-14 days post hospital discharge date 11\1\17. C3 nurse was unable to schedule HOSFU appointment in [facility].  Respectfully,  Rhiannon Knutson,RN  [facility]    [email or web address]       Please do not reply to this message, as this inbox is not routinely monitored.

## 2017-11-11 ENCOUNTER — HOSPITAL ENCOUNTER (EMERGENCY)
Facility: HOSPITAL | Age: 52
Discharge: HOME | End: 2017-11-11
Attending: FAMILY MEDICINE
Payer: MEDICAID

## 2017-11-11 VITALS
DIASTOLIC BLOOD PRESSURE: 92 MMHG | WEIGHT: 251 LBS | TEMPERATURE: 98 F | RESPIRATION RATE: 18 BRPM | OXYGEN SATURATION: 97 % | HEIGHT: 69 IN | HEART RATE: 76 BPM | BODY MASS INDEX: 37.18 KG/M2 | SYSTOLIC BLOOD PRESSURE: 175 MMHG

## 2017-11-11 DIAGNOSIS — T38.0X5A STEROID-INDUCED MYOPATHY: Primary | ICD-10-CM

## 2017-11-11 DIAGNOSIS — G72.0 STEROID-INDUCED MYOPATHY: Primary | ICD-10-CM

## 2017-11-11 PROCEDURE — 99283 EMERGENCY DEPT VISIT LOW MDM: CPT | Mod: ,,, | Performed by: FAMILY MEDICINE

## 2017-11-11 PROCEDURE — 96372 THER/PROPH/DIAG INJ SC/IM: CPT

## 2017-11-11 PROCEDURE — 99283 EMERGENCY DEPT VISIT LOW MDM: CPT | Mod: 25

## 2017-11-11 PROCEDURE — 63600175 PHARM REV CODE 636 W HCPCS: Performed by: FAMILY MEDICINE

## 2017-11-11 RX ORDER — KETOROLAC TROMETHAMINE 30 MG/ML
15 INJECTION, SOLUTION INTRAMUSCULAR; INTRAVENOUS
Status: COMPLETED | OUTPATIENT
Start: 2017-11-11 | End: 2017-11-11

## 2017-11-11 RX ORDER — OXYCODONE AND ACETAMINOPHEN 10; 325 MG/1; MG/1
1 TABLET ORAL EVERY 4 HOURS PRN
Qty: 12 TABLET | Refills: 0 | Status: SHIPPED | OUTPATIENT
Start: 2017-11-11 | End: 2019-01-31

## 2017-11-11 RX ADMIN — KETOROLAC TROMETHAMINE 15 MG: 30 INJECTION, SOLUTION INTRAMUSCULAR at 09:11

## 2017-11-12 NOTE — ED PROVIDER NOTES
Encounter Date: 11/11/2017    SCRIBE #1 NOTE: INathen, am scribing for, and in the presence of, Roger Cristobal MD.       History     Chief Complaint   Patient presents with    Knee Pain     Pt c/o left knee pain  after having knee injection yesterday by PCP. Pt states pain got worse today.      Time seen by provider: 9:07 PM    This is a 52 y.o. male with medical history of hypertension, CAD, CHF, who presents with complaint left knee pain after recent arthrocentesis roughly 1 week ago. Patient denies history of gout, any fever, chest pain, SOB. The pain is exacerbated with flexion of the knee. No alleviating factors identified. Patient denies any other complaints. He denies any new knee swelling.       The history is provided by the patient.     Review of patient's allergies indicates:   Allergen Reactions    Acetaminophen      Itching    Oxycodone-acetaminophen      Other reaction(s): Itching    Ace inhibitors Other (See Comments)     cough     Past Medical History:   Diagnosis Date    Anticoagulant long-term use     Cardiomegaly     CHF (congestive heart failure)     Chronic combined systolic and diastolic congestive heart failure     Coronary artery disease     CVA (cerebrovascular accident)     Heart attack 2006    Hypertension     Hyperthyroidism, subclinical 1/2/2013    MI (myocardial infarction) 9/22/2013    MI in 2009      Paroxysmal atrial fibrillation     PE (pulmonary embolism) 1/1/2013    IN 2010     S/P ablation of atrial flutter 2008    Stroke 2009    no residual weaknesses     Past Surgical History:   Procedure Laterality Date    RADIOFREQUENCY ABLATION  01/08/2008    for atrial flutter     Family History   Problem Relation Age of Onset    Hypertension Mother     Stroke Mother     Hypertension Father     Alcohol abuse Father     Hypertension Sister     Hypertension Brother      Social History   Substance Use Topics    Smoking status: Never Smoker    Smokeless tobacco:  Never Used    Alcohol use No     Review of Systems   Constitutional: Negative for fever.   HENT: Negative for sore throat.    Respiratory: Negative for shortness of breath.    Cardiovascular: Negative for chest pain.   Gastrointestinal: Negative for nausea.   Genitourinary: Negative for dysuria.   Musculoskeletal: Positive for arthralgias (left knee). Negative for back pain.   Skin: Negative for rash.   Neurological: Negative for weakness.   Hematological: Does not bruise/bleed easily.       Physical Exam     Initial Vitals [11/11/17 2028]   BP Pulse Resp Temp SpO2   (!) 175/92 73 18 98.2 °F (36.8 °C) 97 %      MAP       119.67         Physical Exam    Nursing note and vitals reviewed.  Constitutional: No distress.   HENT:   Head: Atraumatic.   Cardiovascular: Normal rate, regular rhythm and normal heart sounds. Exam reveals no gallop and no friction rub.    No murmur heard.  Pulmonary/Chest: Breath sounds normal. He has no wheezes. He has no rhonchi. He has no rales.   Abdominal: Soft. He exhibits no mass. There is no tenderness. There is no rebound and no guarding.   Musculoskeletal:   Left knee is tender to direct palpation of kneecap. No induration, erythema, swelling, point tenderness of the actual arthrocentesis site which was done at the inferior patellar region laterally. Distal calf shows chronic lichenification secondary chronic lymphedema.    Neurological: He is alert and oriented to person, place, and time.         ED Course   Procedures  Labs Reviewed - No data to display          Medical Decision Making:   History:   Old Medical Records: I decided to obtain old medical records.  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  We will treat symptomatically with low-dose Toradol and oxycodone.  Short-term for pain control.  Patient will  follow-up with his regular care providers and                   ED Course      Clinical Impression:   There were no encounter diagnoses.                           Roger Cristobal MD  11/12/17 1042

## 2017-11-12 NOTE — ED TRIAGE NOTES
Left knee pain after having knee knee injection yersterday.      LOC: The patient is awake, alert, aware of environment with an appropriate affect. Oriented x4, speaking appropriately  APPEARANCE: Pt resting comfortably, in no acute distress, pt is clean and well groomed, clothing properly fastened  SKIN:The skin is warm and dry, color consistent with ethnicity, patient has normal skin turgor and moist mucus membranes  RESPIRATORY:Airway is open and patent, respirations are spontaneous, patient has a normal effort and rate, no accessory muscle use noted.  CARDIAC: Normal rate and rhythm,  capillary refill < 3 seconds, bilateral radial pulses 2+.    NEUROLOGIC: PERRLA, facial expression is symmetrical, patient moving all extremities spontaneously, normal sensation in all extremities when touched with a finger.  Follows all commands appropriately  MUSCULOSKELETAL: limited ROM on left knee

## 2017-12-10 ENCOUNTER — HOSPITAL ENCOUNTER (EMERGENCY)
Facility: HOSPITAL | Age: 52
Discharge: HOME | End: 2017-12-10
Attending: FAMILY MEDICINE
Payer: MEDICAID

## 2017-12-10 VITALS
DIASTOLIC BLOOD PRESSURE: 84 MMHG | HEIGHT: 69 IN | BODY MASS INDEX: 38.51 KG/M2 | TEMPERATURE: 99 F | HEART RATE: 68 BPM | SYSTOLIC BLOOD PRESSURE: 145 MMHG | OXYGEN SATURATION: 98 % | WEIGHT: 260 LBS | RESPIRATION RATE: 18 BRPM

## 2017-12-10 DIAGNOSIS — S39.012A STRAIN OF LUMBAR REGION, INITIAL ENCOUNTER: Primary | ICD-10-CM

## 2017-12-10 PROCEDURE — 99283 EMERGENCY DEPT VISIT LOW MDM: CPT | Mod: ,,, | Performed by: FAMILY MEDICINE

## 2017-12-10 PROCEDURE — 63600175 PHARM REV CODE 636 W HCPCS: Performed by: FAMILY MEDICINE

## 2017-12-10 PROCEDURE — 96372 THER/PROPH/DIAG INJ SC/IM: CPT

## 2017-12-10 PROCEDURE — 99283 EMERGENCY DEPT VISIT LOW MDM: CPT | Mod: 25

## 2017-12-10 RX ORDER — TRIAMCINOLONE ACETONIDE 40 MG/ML
80 INJECTION, SUSPENSION INTRA-ARTICULAR; INTRAMUSCULAR
Status: COMPLETED | OUTPATIENT
Start: 2017-12-10 | End: 2017-12-10

## 2017-12-10 RX ORDER — ONDANSETRON 2 MG/ML
4 INJECTION INTRAMUSCULAR; INTRAVENOUS
Status: DISCONTINUED | OUTPATIENT
Start: 2017-12-10 | End: 2017-12-10 | Stop reason: HOSPADM

## 2017-12-10 RX ORDER — CYCLOBENZAPRINE HCL 10 MG
10 TABLET ORAL 3 TIMES DAILY PRN
Qty: 20 TABLET | Refills: 0 | Status: SHIPPED | OUTPATIENT
Start: 2017-12-10 | End: 2018-09-05

## 2017-12-10 RX ORDER — ORPHENADRINE CITRATE 30 MG/ML
60 INJECTION INTRAMUSCULAR; INTRAVENOUS
Status: COMPLETED | OUTPATIENT
Start: 2017-12-10 | End: 2017-12-10

## 2017-12-10 RX ORDER — HYDROMORPHONE HYDROCHLORIDE 2 MG/ML
2 INJECTION, SOLUTION INTRAMUSCULAR; INTRAVENOUS; SUBCUTANEOUS
Status: DISCONTINUED | OUTPATIENT
Start: 2017-12-10 | End: 2017-12-10 | Stop reason: HOSPADM

## 2017-12-10 RX ORDER — TRAMADOL HYDROCHLORIDE 50 MG/1
50-100 TABLET ORAL EVERY 6 HOURS PRN
Qty: 16 TABLET | Refills: 0 | Status: SHIPPED | OUTPATIENT
Start: 2017-12-10 | End: 2019-01-31

## 2017-12-10 RX ADMIN — ORPHENADRINE CITRATE 60 MG: 30 INJECTION INTRAMUSCULAR; INTRAVENOUS at 03:12

## 2017-12-10 RX ADMIN — TRIAMCINOLONE ACETONIDE 80 MG: 40 INJECTION, SUSPENSION INTRA-ARTICULAR; INTRAMUSCULAR at 03:12

## 2017-12-10 NOTE — ED TRIAGE NOTES
Lower back pain , worse on left side, Started 3 days ago, drove home  from [place] and pain began during trip. Denies incontinence. Denies numbness/ tingling in extremities. Denies trauma.

## 2017-12-10 NOTE — ED PROVIDER NOTES
"Encounter Date: 12/10/2017    SCRIBE #1 NOTE: I, Pat Wright, am scribing for, and in the presence of,  Dr. Cristobal. I have scribed the entire note.       History     Chief Complaint   Patient presents with    Back Pain     back pain since leaving [place] within the week     52 y.o. male with PMHx including HTN, CHF, a-fib, and MI presents to the ED complaining of acute left-sided back pain which began this morning. 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 He has experienced back pain in the past, but does not recall it ever being this severe.         The history is provided by the patient and medical records.     Review of patient's allergies indicates:   Allergen Reactions    Acetaminophen      Itching    Oxycodone-acetaminophen      Other reaction(s): Itching    Ace inhibitors Other (See Comments)     cough     Past Medical History:   Diagnosis Date    Anticoagulant long-term use     Cardiomegaly     CHF (congestive heart failure)     Chronic combined systolic and diastolic congestive heart failure     Coronary artery disease     CVA (cerebrovascular accident)     Heart attack 2006    Hypertension     Hyperthyroidism, subclinical 1/2/2013    MI (myocardial infarction) 9/22/2013    MI in 2009      Paroxysmal atrial fibrillation     PE (pulmonary embolism) 1/1/2013    IN 2010     S/P ablation of atrial flutter 2008    Stroke 2009    no residual weaknesses     Past Surgical History:   Procedure Laterality Date    RADIOFREQUENCY ABLATION  01/08/2008    for atrial flutter     Family History   Problem Relation Age of Onset    Hypertension Mother     Stroke Mother     Hypertension Father     Alcohol abuse Father     Hypertension Sister     Hypertension Brother      Social History   Substance Use Topics    Smoking status: Never Smoker    Smokeless " tobacco: Never Used    Alcohol use No     Review of Systems   Constitutional: Negative for chills and fever.        (-) No falls or other injuries.    Genitourinary: Negative for difficulty urinating, dysuria and urgency.   Musculoskeletal: Positive for back pain (Left sided.) .       Physical Exam     Initial Vitals [12/10/17 1404]   BP Pulse Resp Temp SpO2   (!) 162/94 81 18 98.4 °F (36.9 °C) 96 %      MAP       116.67         Physical Exam    Nursing note and vitals reviewed.  Constitutional: No distress.   Uncomfortable but cooperative upon exam.    HENT:   Head: Atraumatic.   Neck: Normal range of motion. Neck supple.   Cardiovascular:   Normal cardiovascular exam. Normal peripheral vascular exam.    Abdominal:   Normal abdominal exam.    Musculoskeletal:   Tenderness in left CVA with palpable muscle spasms; No obvious deformity, induration, or asymmetry noted.   Neurological: He is alert and oriented to person, place, and time.   No acute focal deficits.  Normal neurological exam.          ED Course   Procedures  Labs Reviewed - No data to display          Medical Decision Making:   History:   Old Medical Records: I decided to obtain old medical records.  ED Management:  Patient was low back pain related probably to riding in a diesel truck from here to [place].  No red flags in history or exam suggestive of neurological, vascular, compromise or infection.  A should stable for discharge with pain control and muscle relaxers will follow-up with PCP.            Scribe Attestation:   Scribe #1: I performed the above scribed service and the documentation accurately describes the services I performed. I attest to the accuracy of the note.            ED Course      Clinical Impression:   The encounter diagnosis was Strain of lumbar region, initial encounter.    Disposition:   Disposition: Discharged  Condition: Stable                        Roger Cristobal MD  12/10/17 1788

## 2017-12-10 NOTE — ED NOTES
LOC: The patient is awake and alert; oriented x 3 and speaking appropriately.  APPEARANCE: Patient resting comfortably, patient is clean and well groomed  SKIN: warm and dry, normal skin turgor & moist mucus membranes, skin intact, no breakdown noted.  MUSCULOSKELETAL: Patient moving all extremities well, no obvious swelling or deformities noted, c/o lower back pain , worse on left side. Denies radiation in to legs or numbness/ tingling in extremities. Denies incontinence or trauma.  RESPIRATORY: Airway is open and patent,  respirations are spontaneous, normal effort and rate  CARDIAC: Patient has a normal rate, no peripheral edema noted,  No complaints of chest pain   ABDOMEN: Soft and non tender to palpation, no distention noted.

## 2018-01-12 ENCOUNTER — HOSPITAL ENCOUNTER (EMERGENCY)
Facility: HOSPITAL | Age: 53
Discharge: HOME | End: 2018-01-13
Attending: EMERGENCY MEDICINE
Payer: MEDICAID

## 2018-01-12 DIAGNOSIS — R00.2 PALPITATIONS: ICD-10-CM

## 2018-01-12 DIAGNOSIS — I50.9 CONGESTIVE HEART FAILURE, UNSPECIFIED CONGESTIVE HEART FAILURE CHRONICITY, UNSPECIFIED CONGESTIVE HEART FAILURE TYPE: Primary | ICD-10-CM

## 2018-01-12 DIAGNOSIS — I48.91 ATRIAL FIBRILLATION: ICD-10-CM

## 2018-01-12 PROCEDURE — 96361 HYDRATE IV INFUSION ADD-ON: CPT

## 2018-01-12 PROCEDURE — 93010 ELECTROCARDIOGRAM REPORT: CPT | Mod: ,,, | Performed by: INTERNAL MEDICINE

## 2018-01-12 PROCEDURE — 99284 EMERGENCY DEPT VISIT MOD MDM: CPT | Mod: 25

## 2018-01-12 PROCEDURE — 99285 EMERGENCY DEPT VISIT HI MDM: CPT | Mod: ,,, | Performed by: EMERGENCY MEDICINE

## 2018-01-12 PROCEDURE — 93005 ELECTROCARDIOGRAM TRACING: CPT

## 2018-01-12 PROCEDURE — 96374 THER/PROPH/DIAG INJ IV PUSH: CPT

## 2018-01-13 VITALS
OXYGEN SATURATION: 96 % | RESPIRATION RATE: 18 BRPM | TEMPERATURE: 99 F | SYSTOLIC BLOOD PRESSURE: 140 MMHG | BODY MASS INDEX: 37.03 KG/M2 | HEART RATE: 72 BPM | WEIGHT: 250 LBS | DIASTOLIC BLOOD PRESSURE: 90 MMHG | HEIGHT: 69 IN

## 2018-01-13 LAB
ALBUMIN SERPL BCP-MCNC: 3.5 G/DL
ALP SERPL-CCNC: 167 U/L
ALT SERPL W/O P-5'-P-CCNC: 18 U/L
ANION GAP SERPL CALC-SCNC: 8 MMOL/L
AST SERPL-CCNC: 25 U/L
BASOPHILS # BLD AUTO: 0.06 K/UL
BASOPHILS NFR BLD: 0.7 %
BILIRUB SERPL-MCNC: 1.5 MG/DL
BNP SERPL-MCNC: 555 PG/ML
BUN SERPL-MCNC: 17 MG/DL
CALCIUM SERPL-MCNC: 9.6 MG/DL
CHLORIDE SERPL-SCNC: 104 MMOL/L
CO2 SERPL-SCNC: 29 MMOL/L
CREAT SERPL-MCNC: 1.2 MG/DL
DIFFERENTIAL METHOD: ABNORMAL
EOSINOPHIL # BLD AUTO: 0.3 K/UL
EOSINOPHIL NFR BLD: 3.7 %
ERYTHROCYTE [DISTWIDTH] IN BLOOD BY AUTOMATED COUNT: 14.5 %
EST. GFR  (AFRICAN AMERICAN): >60 ML/MIN/1.73 M^2
EST. GFR  (NON AFRICAN AMERICAN): >60 ML/MIN/1.73 M^2
GLUCOSE SERPL-MCNC: 100 MG/DL
HCT VFR BLD AUTO: 37.5 %
HGB BLD-MCNC: 11.9 G/DL
IMM GRANULOCYTES # BLD AUTO: 0.02 K/UL
IMM GRANULOCYTES NFR BLD AUTO: 0.2 %
INR PPP: 1.3
LYMPHOCYTES # BLD AUTO: 1.4 K/UL
LYMPHOCYTES NFR BLD: 17.6 %
MCH RBC QN AUTO: 28.5 PG
MCHC RBC AUTO-ENTMCNC: 31.7 G/DL
MCV RBC AUTO: 90 FL
MONOCYTES # BLD AUTO: 1.2 K/UL
MONOCYTES NFR BLD: 14.7 %
NEUTROPHILS # BLD AUTO: 5.1 K/UL
NEUTROPHILS NFR BLD: 63.1 %
NRBC BLD-RTO: 0 /100 WBC
PLATELET # BLD AUTO: 200 K/UL
PMV BLD AUTO: 9.6 FL
POTASSIUM SERPL-SCNC: 3.4 MMOL/L
PROT SERPL-MCNC: 7.8 G/DL
PROTHROMBIN TIME: 13 SEC
RBC # BLD AUTO: 4.17 M/UL
SODIUM SERPL-SCNC: 141 MMOL/L
TROPONIN I SERPL DL<=0.01 NG/ML-MCNC: 0.09 NG/ML
TROPONIN I SERPL DL<=0.01 NG/ML-MCNC: 0.09 NG/ML
TSH SERPL DL<=0.005 MIU/L-ACNC: 1.62 UIU/ML
WBC # BLD AUTO: 8.16 K/UL

## 2018-01-13 PROCEDURE — 84484 ASSAY OF TROPONIN QUANT: CPT | Mod: 91

## 2018-01-13 PROCEDURE — 84484 ASSAY OF TROPONIN QUANT: CPT

## 2018-01-13 PROCEDURE — 25000003 PHARM REV CODE 250: Performed by: EMERGENCY MEDICINE

## 2018-01-13 PROCEDURE — 83880 ASSAY OF NATRIURETIC PEPTIDE: CPT

## 2018-01-13 PROCEDURE — 84443 ASSAY THYROID STIM HORMONE: CPT

## 2018-01-13 PROCEDURE — 85025 COMPLETE CBC W/AUTO DIFF WBC: CPT

## 2018-01-13 PROCEDURE — 85610 PROTHROMBIN TIME: CPT

## 2018-01-13 PROCEDURE — 80053 COMPREHEN METABOLIC PANEL: CPT

## 2018-01-13 PROCEDURE — 63600175 PHARM REV CODE 636 W HCPCS: Performed by: EMERGENCY MEDICINE

## 2018-01-13 RX ORDER — FUROSEMIDE 10 MG/ML
80 INJECTION INTRAMUSCULAR; INTRAVENOUS
Status: COMPLETED | OUTPATIENT
Start: 2018-01-13 | End: 2018-01-13

## 2018-01-13 RX ADMIN — FUROSEMIDE 80 MG: 10 INJECTION, SOLUTION INTRAMUSCULAR; INTRAVENOUS at 03:01

## 2018-01-13 RX ADMIN — SODIUM CHLORIDE 250 ML: 900 INJECTION, SOLUTION INTRAVENOUS at 12:01

## 2018-01-13 NOTE — DISCHARGE INSTRUCTIONS
Take blood pressure once daily   Keep a log  Follow up with your primary care doctor Monday for re-evaluation

## 2018-01-13 NOTE — PROVIDER PROGRESS NOTES - EMERGENCY DEPT.
Encounter Date: 1/12/2018    ED Physician Progress Notes         EKG - STEMI Decision  Initial Reading: No STEMI present.

## 2018-01-13 NOTE — ED TRIAGE NOTES
"Hamlet Terrell, a 52 y.o. male presents to the ED c/o high BP at home - pt states he takes his BP twice daily and noticed it was elevated tonight, highest of 150/102. Denies CP, SOB, dizziness, blurred vision or headache. States he has felt "light footed" since yesterday and occasionally feels the need to hold onto the wall.      Chief Complaint   Patient presents with    Hypertension     Presents to ED with c/o HTN, took it 3 times on home machine, highest reading 150/102. Hx of HTN, reports he's on rx meds.      Review of patient's allergies indicates:   Allergen Reactions    Acetaminophen      Itching    Oxycodone-acetaminophen      Other reaction(s): Itching    Ace inhibitors Other (See Comments)     cough     Past Medical History:   Diagnosis Date    Anticoagulant long-term use     Cardiomegaly     CHF (congestive heart failure)     Chronic combined systolic and diastolic congestive heart failure     Coronary artery disease     CVA (cerebrovascular accident)     Heart attack 2006    Hypertension     Hyperthyroidism, subclinical 1/2/2013    MI (myocardial infarction) 9/22/2013    MI in 2009      Paroxysmal atrial fibrillation     PE (pulmonary embolism) 1/1/2013    IN 2010     S/P ablation of atrial flutter 2008    Stroke 2009    no residual weaknesses        Adult Physical Assessment  LOC: Hamlet Terrell, 52 y.o. male verified via two identifiers.  The patient is awake, alert, oriented and speaking appropriately at this time.  APPEARANCE: Patient resting comfortably and appears to be in no acute distress at this time. Patient is clean and well groomed, patient's clothing is properly fastened.  SKIN:The skin is warm and dry, color consistent with ethnicity, patient has normal skin turgor and moist mucus membranes, skin intact, no breakdown or brusing noted.  MUSCULOSKELETAL: Patient moving all extremities well, no obvious swelling or deformities noted.  RESPIRATORY: Airway is open and patent, " respirations are spontaneous, patient has a normal effort and rate, no accessory muscle use noted.  CARDIAC: Patient in a-fib at controlled rate - 91, with hx of a-fib, no periphreal edema noted in any extremity, capillary refill < 3 seconds in all extremities  ABDOMEN: Soft and non tender to palpation, no abdominal distention noted. Bowel sounds present in all four quadrants.  NEUROLOGIC: Eyes open spontaneously, behavior appropriate to situation, follows commands, facial expression symmetrical, bilateral hand grasp equal and even, purposeful motor response noted, normal sensation in all extremities when touched with a finger.

## 2018-01-13 NOTE — ED PROVIDER NOTES
Encounter Date: 1/12/2018       History     Chief Complaint   Patient presents with    Hypertension     Presents to ED with c/o HTN, took it 3 times on home machine, highest reading 150/102. Hx of HTN, reports he's on rx meds.      52-year-old gentleman with history of hypertension, CHF presents with elevated blood pressure at home.  He reports that his blood pressure was systolic 130 and he repeated it and was systolic 150.  He denies any specific symptoms of prompted him to take his blood pressure at home.  He states that he has been feeling poorly for the last week, having URI nasal congestion and nonproductive cough.  No fevers.  He denies any chest pain or significant shortness of breath at this time.  He states that he just doesn't feel good.  He reports compliance with his medications.      The history is provided by the patient.     Review of patient's allergies indicates:   Allergen Reactions    Acetaminophen      Itching    Oxycodone-acetaminophen      Other reaction(s): Itching    Ace inhibitors Other (See Comments)     cough     Past Medical History:   Diagnosis Date    Anticoagulant long-term use     Cardiomegaly     CHF (congestive heart failure)     Chronic combined systolic and diastolic congestive heart failure     Coronary artery disease     CVA (cerebrovascular accident)     Heart attack 2006    Hypertension     Hyperthyroidism, subclinical 1/2/2013    MI (myocardial infarction) 9/22/2013    MI in 2009      Paroxysmal atrial fibrillation     PE (pulmonary embolism) 1/1/2013    IN 2010     S/P ablation of atrial flutter 2008    Stroke 2009    no residual weaknesses     Past Surgical History:   Procedure Laterality Date    RADIOFREQUENCY ABLATION  01/08/2008    for atrial flutter     Family History   Problem Relation Age of Onset    Hypertension Mother     Stroke Mother     Hypertension Father     Alcohol abuse Father     Hypertension Sister     Hypertension Brother       Social History   Substance Use Topics    Smoking status: Never Smoker    Smokeless tobacco: Never Used    Alcohol use No     Review of Systems   Constitutional: Positive for activity change and appetite change. Negative for fever.   HENT: Negative for sore throat.    Respiratory: Positive for shortness of breath.    Cardiovascular: Negative for chest pain.   Gastrointestinal: Negative for nausea.   Genitourinary: Negative for dysuria.   Musculoskeletal: Negative for back pain.   Skin: Negative for rash.   Neurological: Negative for weakness.   Hematological: Does not bruise/bleed easily.   All other systems reviewed and are negative.      Physical Exam     Initial Vitals [01/12/18 2026]   BP Pulse Resp Temp SpO2   (!) 158/94 82 16 98 °F (36.7 °C) 96 %      MAP       115.33         Physical Exam    Vitals reviewed.  Constitutional: Vital signs are normal. He appears well-developed and well-nourished.   HENT:   Head: Normocephalic and atraumatic.   Mouth/Throat: Oropharynx is clear and moist.   Eyes: Conjunctivae and EOM are normal. Pupils are equal, round, and reactive to light.   Neck: Trachea normal and normal range of motion. Neck supple.   Cardiovascular: Normal rate, regular rhythm, normal heart sounds and normal pulses.   Pulmonary/Chest: Breath sounds normal. No respiratory distress.   Abdominal: Soft. Normal appearance and bowel sounds are normal. There is no tenderness.   Musculoskeletal: Normal range of motion.   Neurological: He is alert and oriented to person, place, and time.   Skin: Skin is warm and dry.         ED Course   Procedures  Labs Reviewed   CBC W/ AUTO DIFFERENTIAL - Abnormal; Notable for the following:        Result Value    RBC 4.17 (*)     Hemoglobin 11.9 (*)     Hematocrit 37.5 (*)     MCHC 31.7 (*)     Mono # 1.2 (*)     Lymph% 17.6 (*)     All other components within normal limits   COMPREHENSIVE METABOLIC PANEL - Abnormal; Notable for the following:     Potassium 3.4 (*)      Total Bilirubin 1.5 (*)     Alkaline Phosphatase 167 (*)     All other components within normal limits   B-TYPE NATRIURETIC PEPTIDE - Abnormal; Notable for the following:      (*)     All other components within normal limits   PROTIME-INR - Abnormal; Notable for the following:     Prothrombin Time 13.0 (*)     INR 1.3 (*)     All other components within normal limits   TROPONIN I - Abnormal; Notable for the following:     Troponin I 0.093 (*)     All other components within normal limits   TROPONIN I - Abnormal; Notable for the following:     Troponin I 0.093 (*)     All other components within normal limits   TSH   TROPONIN I     EKG Readings: (Independently Interpreted)   Initial Reading: No STEMI. Rhythm: Atrial Fibrillation. Heart Rate: 91. Axis: Left Axis Deviation.       X-Rays:   Independently Interpreted Readings:   Chest X-Ray: No acute abnormalities. Cardiomegaly present.  Increased vascular markings consistent with CHF are present.     Medical Decision Making:   History:   Old Medical Records: I decided to obtain old medical records.  Independently Interpreted Test(s):   I have ordered and independently interpreted X-rays - see prior notes.  I have ordered and independently interpreted EKG Reading(s) - see prior notes  Clinical Tests:   Lab Tests: Ordered and Reviewed  Radiological Study: Ordered and Reviewed  Medical Tests: Ordered and Reviewed              Attending Attestation:             Attending ED Notes:   Evaluation of elevated blood pressure though I would say that the patient's blood pressure at home and his blood pressure in the emergency department are not elevated.  He is concerned that they are.  His general unwell feeling which is likely secondary to viral syndrome.  He is having poor by mouth intake as well.  There are no acute findings on physical exam that are concerning.  His chest x-ray is consistent with prior.  Lab work demonstrates a slight elevation in his BNP.  Serial  troponins stable.  Initially the patient did not want any blood work done, and then he agreed to have some blood work done.  At the time of discharge, the patient did not want to go home, but he did not want to be admitted to the hospital.  It is unclear what the patient's goals of care might have been.  He was given a dose of IV Lasix and discharged in good condition.  I recommended they keep a blood pressure log and follow up with his primary team on Monday if his symptoms did persist.          ED Course      Clinical Impression:   The primary encounter diagnosis was Congestive heart failure, unspecified congestive heart failure chronicity, unspecified congestive heart failure type. Diagnoses of Atrial fibrillation and Palpitations were also pertinent to this visit.                           Luis Brooks MD  01/15/18 4859

## 2018-02-21 ENCOUNTER — HOSPITAL ENCOUNTER (EMERGENCY)
Facility: HOSPITAL | Age: 53
Discharge: HOME | End: 2018-02-21
Attending: EMERGENCY MEDICINE
Payer: MEDICAID

## 2018-02-21 VITALS
OXYGEN SATURATION: 95 % | BODY MASS INDEX: 38.51 KG/M2 | HEART RATE: 67 BPM | WEIGHT: 260 LBS | SYSTOLIC BLOOD PRESSURE: 177 MMHG | HEIGHT: 69 IN | RESPIRATION RATE: 20 BRPM | TEMPERATURE: 99 F | DIASTOLIC BLOOD PRESSURE: 98 MMHG

## 2018-02-21 DIAGNOSIS — M10.9 GOUTY ARTHRITIS: Primary | ICD-10-CM

## 2018-02-21 LAB — URATE SERPL-MCNC: 9.4 MG/DL

## 2018-02-21 PROCEDURE — 63600175 PHARM REV CODE 636 W HCPCS: Performed by: EMERGENCY MEDICINE

## 2018-02-21 PROCEDURE — 96372 THER/PROPH/DIAG INJ SC/IM: CPT

## 2018-02-21 PROCEDURE — 84550 ASSAY OF BLOOD/URIC ACID: CPT

## 2018-02-21 PROCEDURE — 29130 APPL FINGER SPLINT STATIC: CPT | Mod: F1

## 2018-02-21 PROCEDURE — 99284 EMERGENCY DEPT VISIT MOD MDM: CPT | Mod: 25

## 2018-02-21 PROCEDURE — 25000003 PHARM REV CODE 250: Performed by: EMERGENCY MEDICINE

## 2018-02-21 PROCEDURE — 99499 UNLISTED E&M SERVICE: CPT | Mod: ,,, | Performed by: EMERGENCY MEDICINE

## 2018-02-21 RX ORDER — DEXAMETHASONE SODIUM PHOSPHATE 4 MG/ML
10 INJECTION, SOLUTION INTRA-ARTICULAR; INTRALESIONAL; INTRAMUSCULAR; INTRAVENOUS; SOFT TISSUE ONCE
Status: DISCONTINUED | OUTPATIENT
Start: 2018-02-21 | End: 2018-02-21 | Stop reason: HOSPADM

## 2018-02-21 RX ORDER — DEXAMETHASONE SODIUM PHOSPHATE 4 MG/ML
10 INJECTION, SOLUTION INTRA-ARTICULAR; INTRALESIONAL; INTRAMUSCULAR; INTRAVENOUS; SOFT TISSUE
Status: COMPLETED | OUTPATIENT
Start: 2018-02-21 | End: 2018-02-21

## 2018-02-21 RX ORDER — IBUPROFEN 400 MG/1
800 TABLET ORAL
Status: COMPLETED | OUTPATIENT
Start: 2018-02-21 | End: 2018-02-21

## 2018-02-21 RX ADMIN — IBUPROFEN 800 MG: 400 TABLET, FILM COATED ORAL at 03:02

## 2018-02-21 RX ADMIN — DEXAMETHASONE SODIUM PHOSPHATE 10 MG: 4 INJECTION, SOLUTION INTRAMUSCULAR; INTRAVENOUS at 05:02

## 2018-02-21 NOTE — ED TRIAGE NOTES
Patient states he woke up this morning with left index finger swelling and left hand swelling.  Patient states he takes gout medication.

## 2018-02-21 NOTE — ED NOTES
LOC: The patient is awake, alert and aware of environment with an appropriate affect, the patient is oriented x 3 and speaking appropriately.  APPEARANCE: Patient resting comfortably and in no acute distress, patient is clean and well groomed, patient's clothing is properly fastened.  SKIN: The skin is warm and dry, color consistent with ethnicity, patient has normal skin turgor and moist mucus membranes, skin intact, no breakdown or bruising noted.  MUSCULOSKELETAL: Left hand swelling and pain.  Swelling to left index finger and limited ROM.   RESPIRATORY: Airway is open and patent, respirations are spontaneous, patient has a normal effort and rate, no accessory muscle use noted.  CARDIAC: Patient has a normal rate and rhythm, no periphreal edema noted, capillary refill < 3 seconds.  ABDOMEN: Soft and non tender to palpation, no distention noted.  NEUROLOGIC: eyes open spontaneously, behavior appropriate to situation, follows commands, facial expression symmetrical, bilateral hand grasp equal and even, purposeful motor response noted, normal sensation in all extremities when touched with a finger.

## 2018-02-21 NOTE — ED PROVIDER NOTES
Encounter Date: 2/21/2018    SCRIBE #1 NOTE: I, Shayla Mathias, am scribing for, and in the presence of,  Dr. Buitrago . I have scribed the entire note.       History     Chief Complaint   Patient presents with    Hand Swelling     left hand swelling.      Time seen by provider: 3:20 PM    This is a 52 y.o. male with comorbidites including HTN, CAD, and CHF who presents with complaint of acute left index finger pain and swelling x 2 days. Pain is worse with movement and pt states he is unable to bend his finger. He was seen by his PCP and given a steroid cream with no relief of symptoms.       The history is provided by the patient and medical records.     Review of patient's allergies indicates:   Allergen Reactions    Acetaminophen      Itching    Oxycodone-acetaminophen      Other reaction(s): Itching    Ace inhibitors Other (See Comments)     cough     Past Medical History:   Diagnosis Date    Anticoagulant long-term use     Cardiomegaly     CHF (congestive heart failure)     Chronic combined systolic and diastolic congestive heart failure     Coronary artery disease     CVA (cerebrovascular accident)     Heart attack 2006    Hypertension     Hyperthyroidism, subclinical 1/2/2013    MI (myocardial infarction) 9/22/2013    MI in 2009      Paroxysmal atrial fibrillation     PE (pulmonary embolism) 1/1/2013    IN 2010     S/P ablation of atrial flutter 2008    Stroke 2009    no residual weaknesses     Past Surgical History:   Procedure Laterality Date    RADIOFREQUENCY ABLATION  01/08/2008    for atrial flutter     Family History   Problem Relation Age of Onset    Hypertension Mother     Stroke Mother     Hypertension Father     Alcohol abuse Father     Hypertension Sister     Hypertension Brother      Social History   Substance Use Topics    Smoking status: Never Smoker    Smokeless tobacco: Never Used    Alcohol use No     Review of Systems   Constitutional: Negative for fever.    HENT: Negative for sore throat.    Respiratory: Negative for shortness of breath.    Cardiovascular: Negative for chest pain.   Gastrointestinal: Negative for nausea.   Genitourinary: Negative for dysuria.   Musculoskeletal:        Positive for left index finger swelling and pain.    Skin: Negative for rash.   Neurological: Negative for weakness.   Hematological: Does not bruise/bleed easily.       Physical Exam     Initial Vitals [02/21/18 1507]   BP Pulse Resp Temp SpO2   (!) 177/98 67 20 98.6 °F (37 °C) 95 %      MAP       124.33         Physical Exam    Nursing note and vitals reviewed.  Constitutional: He appears well-developed and well-nourished.   Pt not cooperative with exam.    HENT:   Head: Normocephalic.   Mouth/Throat: Oropharynx is clear and moist.   Eyes: EOM are normal.   Neck: Normal range of motion.   Cardiovascular: Normal rate, regular rhythm and normal heart sounds.   Pulmonary/Chest: Breath sounds normal.   Abdominal: Soft. Bowel sounds are normal. He exhibits no distension. There is no tenderness. There is no rebound and no guarding.   Musculoskeletal: Normal range of motion.   Tenderness to the left index DIP. No evidence of tenosynovitis.   Neurological: He is alert and oriented to person, place, and time. He has normal strength.   Skin: Skin is warm and dry. Capillary refill takes less than 2 seconds. No erythema. No pallor.         ED Course   Procedures  Labs Reviewed   URIC ACID - Abnormal; Notable for the following:        Result Value    Uric Acid 9.4 (*)     All other components within normal limits        Imaging Results          X-Ray Hand 3 view Left (Final result)  Result time 02/21/18 16:20:16    Final result by Ashley Herndon MD (02/21/18 16:20:16)                 Impression:      Please see above.      Electronically signed by: Ashley Herndon MD  Date:     02/21/18  Time:    16:20              Narrative:    Time of Procedure: 02/21/18 16:06:24  Accession #  [id number]      Comparison: None.    Number of images: 3.  PA, lateral and oblique views of the left hand.    Findings:  Pain marker is directed at the radial and volar aspects of the PIP of the index finger.    Although there may be soft tissue swelling in the proximal left index finger, I detect no fracture, dislocation, radiopaque retained foreign body, lytic or blastic lesion.  Ulnar styloid is intact.  If the patient has pain over the snuffbox I recommend prompt further assessment with dedicated views.                                Medical Decision Making:   History:   Old Medical Records: I decided to obtain old medical records.  Initial Assessment:   Gout, fracture, sprain.   Independently Interpreted Test(s):   I have ordered and independently interpreted X-rays - see prior notes.  Clinical Tests:   Lab Tests: Ordered and Reviewed  Radiological Study: Ordered and Reviewed            Scribe Attestation:   Scribe #1: I performed the above scribed service and the documentation accurately describes the services I performed. I attest to the accuracy of the note.               Clinical Impression:   The encounter diagnosis was Gouty arthritis.                           George Camarena MD  02/21/18 1642       George Camarena MD  02/23/18 0833

## 2018-03-30 PROCEDURE — 99284 EMERGENCY DEPT VISIT MOD MDM: CPT | Mod: ,,, | Performed by: PHYSICIAN ASSISTANT

## 2018-03-30 PROCEDURE — 99284 EMERGENCY DEPT VISIT MOD MDM: CPT

## 2018-03-31 ENCOUNTER — HOSPITAL ENCOUNTER (EMERGENCY)
Facility: HOSPITAL | Age: 53
Discharge: HOME | End: 2018-03-31
Attending: EMERGENCY MEDICINE
Payer: MEDICAID

## 2018-03-31 VITALS
HEIGHT: 69 IN | BODY MASS INDEX: 38.51 KG/M2 | WEIGHT: 260 LBS | SYSTOLIC BLOOD PRESSURE: 134 MMHG | TEMPERATURE: 98 F | HEART RATE: 78 BPM | OXYGEN SATURATION: 96 % | RESPIRATION RATE: 20 BRPM | DIASTOLIC BLOOD PRESSURE: 91 MMHG

## 2018-03-31 DIAGNOSIS — S30.0XXA CONTUSION OF LOWER BACK, INITIAL ENCOUNTER: ICD-10-CM

## 2018-03-31 DIAGNOSIS — M54.50 ACUTE MIDLINE LOW BACK PAIN WITHOUT SCIATICA: Primary | ICD-10-CM

## 2018-03-31 LAB
INR PPP: 2.6
PROTHROMBIN TIME: 24.9 SEC

## 2018-03-31 PROCEDURE — 85610 PROTHROMBIN TIME: CPT

## 2018-03-31 PROCEDURE — 25000003 PHARM REV CODE 250: Performed by: PHYSICIAN ASSISTANT

## 2018-03-31 RX ORDER — ISOSORBIDE DINITRATE 5 MG/1
20 TABLET ORAL
Status: COMPLETED | OUTPATIENT
Start: 2018-03-31 | End: 2018-03-31

## 2018-03-31 RX ORDER — LIDOCAINE 50 MG/G
1 PATCH TOPICAL DAILY
Qty: 15 PATCH | Refills: 0 | Status: SHIPPED | OUTPATIENT
Start: 2018-03-31 | End: 2018-03-31

## 2018-03-31 RX ORDER — LIDOCAINE 50 MG/G
1 PATCH TOPICAL DAILY
Qty: 15 PATCH | Refills: 0 | Status: SHIPPED | OUTPATIENT
Start: 2018-03-31 | End: 2019-01-31

## 2018-03-31 RX ORDER — OXYCODONE AND ACETAMINOPHEN 10; 325 MG/1; MG/1
1 TABLET ORAL
Status: COMPLETED | OUTPATIENT
Start: 2018-03-31 | End: 2018-03-31

## 2018-03-31 RX ORDER — CARVEDILOL 12.5 MG/1
25 TABLET ORAL
Status: COMPLETED | OUTPATIENT
Start: 2018-03-31 | End: 2018-03-31

## 2018-03-31 RX ADMIN — ISOSORBIDE DINITRATE 20 MG: 5 TABLET ORAL at 01:03

## 2018-03-31 RX ADMIN — OXYCODONE HYDROCHLORIDE AND ACETAMINOPHEN 1 TABLET: 10; 325 TABLET ORAL at 12:03

## 2018-03-31 RX ADMIN — CARVEDILOL 25 MG: 12.5 TABLET, FILM COATED ORAL at 01:03

## 2018-03-31 NOTE — ED PROVIDER NOTES
Encounter Date: 3/30/2018       History     Chief Complaint   Patient presents with    Back Pain     Pt reports mechanical fall 2 days ago, seen by PCP and given steroid injection without relief.     Patient is a 52-year-old male with a history of CHF, A. fib on Coumadin, CVA is presenting to the ER for evaluation of low back pain.  Patient states he was getting onto an 18 grant when he missed his step and fell onto his lower back.  There is no head injury at the time.  No loss of consciousness.  He states that since the fall he has had continued pain to the lower back.  Nonradiating.  He denies any paresthesias or focal weakness to his extremity.  Patient states he was seen by his family doctor on the same day of the incident.  He was given 1 steroid shot.  He states that since then he has not had any alleviation.  Denies any bowel or bladder dysfunction.  No saddle anesthesia.  He says that he has been taking over-the-counter Advil with no alleviation.      The history is provided by the patient.     Review of patient's allergies indicates:   Allergen Reactions    Acetaminophen      Itching    Oxycodone-acetaminophen      Other reaction(s): Itching    Ace inhibitors Other (See Comments)     cough     Past Medical History:   Diagnosis Date    Anticoagulant long-term use     Cardiomegaly     CHF (congestive heart failure)     Chronic combined systolic and diastolic congestive heart failure     Coronary artery disease     CVA (cerebrovascular accident)     Heart attack 2006    Hypertension     Hyperthyroidism, subclinical 1/2/2013    MI (myocardial infarction) 9/22/2013    MI in 2009      Paroxysmal atrial fibrillation     PE (pulmonary embolism) 1/1/2013    IN 2010     S/P ablation of atrial flutter 2008    Stroke 2009    no residual weaknesses     Past Surgical History:   Procedure Laterality Date    RADIOFREQUENCY ABLATION  01/08/2008    for atrial flutter     Family History   Problem Relation  Age of Onset    Hypertension Mother     Stroke Mother     Hypertension Father     Alcohol abuse Father     Hypertension Sister     Hypertension Brother      Social History   Substance Use Topics    Smoking status: Never Smoker    Smokeless tobacco: Never Used    Alcohol use No     Review of Systems   Constitutional: Negative for chills and fever.   Respiratory: Negative for cough and shortness of breath.    Cardiovascular: Negative for chest pain.   Gastrointestinal: Negative for abdominal pain, nausea and vomiting.   Genitourinary: Negative for flank pain.   Musculoskeletal: Positive for back pain. Negative for gait problem.   Skin: Negative for rash and wound.   Allergic/Immunologic: Negative for immunocompromised state.   Neurological: Negative for weakness and numbness.   Hematological: Bruises/bleeds easily.   Psychiatric/Behavioral: Negative for confusion.       Physical Exam     Initial Vitals [03/30/18 2152]   BP Pulse Resp Temp SpO2   (!) 180/110 78 18 98 °F (36.7 °C) 97 %      MAP       133.33         Physical Exam    Constitutional: He appears well-developed and well-nourished.   HENT:   Head: Normocephalic and atraumatic.   Eyes: Conjunctivae and EOM are normal.   Cardiovascular: Normal rate, normal heart sounds and intact distal pulses. An irregularly irregular rhythm present.   Pulmonary/Chest: Breath sounds normal. No respiratory distress.   Abdominal: Soft. There is no tenderness.   Musculoskeletal:        Lumbar back: He exhibits tenderness. He exhibits no swelling and no deformity.   Midline tenderness over the lumbar spine. L1-L5.   Tenderness over the paraspinal muscles b/l.   No pain on straight leg raise.     Strength of lower extremities equal b/l   Neurological: He is alert and oriented to person, place, and time.   Skin: Skin is warm and dry.         ED Course   Procedures  Labs Reviewed   PROTIME-INR - Abnormal; Notable for the following:        Result Value    Prothrombin Time  24.9 (*)     INR 2.6 (*)     All other components within normal limits        CT Lumbar Spine Without Contrast   Final Result      1. No fractures.  No osseous destruction.  No traumatic subluxation or dislocation.         Electronically signed by: George Woods MD   Date:    03/31/2018   Time:    02:19                   APC / Resident Notes:   Patient seen in the ER promptly upon arrival. He is afebrile, no acute distress.  Physical examination revealed tenderness the lower back.  He also has complaints of the paraspinal muscles.  He was able to ambulate without difficulty.  No focal deficits over the lower extremity.  CT of the lumbar spine distention acute fractures or shift distraction.  No dramatic subluxation or dislocation.  INR therapeutic 2.6.  He was given Percocet in the ED.  He was also prescribed home on lidocaine patch to use as distracted.  I did advise patient to follow-up with his family doctor for further management of this pain.  He is otherwise to follow therapeutic.    The care of this patient was overseen by attending physician who agrees with treatment, plan, and disposition.           Attending Attestation:     Physician Attestation Statement for NP/PA:   I discussed this assessment and plan of this patient with the NP/PA, but I did not personally examine the patient. The face to face encounter was performed by the NP/PA.                     Clinical Impression:   The primary encounter diagnosis was Acute midline low back pain without sciatica. A diagnosis of Contusion of lower back, initial encounter was also pertinent to this visit.    Disposition:   Disposition: Discharged  Condition: Stable                        Cathleen Wheat PA-C  03/31/18 0525       Bryan Kim MD  03/31/18 1051

## 2018-05-03 ENCOUNTER — HOSPITAL ENCOUNTER (EMERGENCY)
Facility: HOSPITAL | Age: 53
Discharge: HOME | End: 2018-05-04
Attending: EMERGENCY MEDICINE
Payer: MEDICAID
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
## 2018-05-03 DIAGNOSIS — S09.90XA CLOSED HEAD INJURY, INITIAL ENCOUNTER: Primary | ICD-10-CM

## 2018-05-03 LAB
INR PPP: 1.2
PROTHROMBIN TIME: 12.6 SEC

## 2018-05-03 PROCEDURE — 93010 ELECTROCARDIOGRAM REPORT: CPT | Mod: ,,, | Performed by: INTERNAL MEDICINE

## 2018-05-03 PROCEDURE — 25000003 PHARM REV CODE 250: Performed by: EMERGENCY MEDICINE

## 2018-05-03 PROCEDURE — 93005 ELECTROCARDIOGRAM TRACING: CPT

## 2018-05-03 PROCEDURE — 99284 EMERGENCY DEPT VISIT MOD MDM: CPT | Mod: 25

## 2018-05-03 PROCEDURE — 99284 EMERGENCY DEPT VISIT MOD MDM: CPT | Mod: ,,, | Performed by: EMERGENCY MEDICINE

## 2018-05-03 PROCEDURE — 85610 PROTHROMBIN TIME: CPT

## 2018-05-03 RX ORDER — TRAMADOL HYDROCHLORIDE 50 MG/1
50 TABLET ORAL
Status: COMPLETED | OUTPATIENT
Start: 2018-05-03 | End: 2018-05-03

## 2018-05-03 RX ADMIN — TRAMADOL HYDROCHLORIDE 50 MG: 50 TABLET, FILM COATED ORAL at 10:05

## 2018-05-04 VITALS
RESPIRATION RATE: 20 BRPM | BODY MASS INDEX: 38.5 KG/M2 | SYSTOLIC BLOOD PRESSURE: 129 MMHG | WEIGHT: 259.94 LBS | DIASTOLIC BLOOD PRESSURE: 92 MMHG | HEIGHT: 69 IN | HEART RATE: 65 BPM | TEMPERATURE: 98 F | OXYGEN SATURATION: 96 %

## 2018-05-04 RX ORDER — BUTALBITAL, ACETAMINOPHEN AND CAFFEINE 50; 325; 40 MG/1; MG/1; MG/1
1 TABLET ORAL EVERY 6 HOURS PRN
Qty: 20 TABLET | Refills: 0 | Status: SHIPPED | OUTPATIENT
Start: 2018-05-04 | End: 2018-06-03

## 2018-05-04 NOTE — ED TRIAGE NOTES
Pr arrived to Ed by self, c/o of head trauma and chronic lower back pain. Pt was moving groceries into his house and lost his footing. Pt states he fell on his right side with no loss of consciousness. Denies any right shoulder pain and any tingling or numbness to extremities. Pt states that he suffers from chronic lower back pain with this fall not affecting his pain. Endorses a HA.

## 2018-05-04 NOTE — ED PROVIDER NOTES
Encounter Date: 5/3/2018    SCRIBE #1 NOTE: I, Ely Gonsalves, am scribing for, and in the presence of,  Dr. Kim. I have scribed the entire note.       History     Chief Complaint   Patient presents with    Fall     reports falling and hitting head on cement tonight, states that he takes Warfarin daily, reports headache, denies blurred vision or dizziness     HPI   Time patient was seen by the provider: 10:15 PM      The patient is a 52 y.o. male with co-morbidities including: HTN and history of CVA, CAD, CHF, PE, MI, who presents to the ED with a complaint of headache s/p mechanical fall tonight. The patient reports he missed a step when he fell and hit the right side of his head. He states he stayed laying on the ground for about 15-20 minutes. Patient's chronic back pain is unchanging from baseline after fall. He denies any LOC, neck pain, any recent SOB, chest pain, palpitations, fever, chills, or cough. He denies any new weakness, numbness, or tingling in his extremities. Patient is taking Coumadin daily.       Review of patient's allergies indicates:   Allergen Reactions    Acetaminophen      Itching    Oxycodone-acetaminophen      Other reaction(s): Itching    Ace inhibitors Other (See Comments)     cough     Past Medical History:   Diagnosis Date    Anticoagulant long-term use     Cardiomegaly     CHF (congestive heart failure)     Chronic combined systolic and diastolic congestive heart failure     Coronary artery disease     CVA (cerebrovascular accident)     Heart attack 2006    Hypertension     Hyperthyroidism, subclinical 1/2/2013    MI (myocardial infarction) 9/22/2013    MI in 2009      Paroxysmal atrial fibrillation     PE (pulmonary embolism) 1/1/2013    IN 2010     S/P ablation of atrial flutter 2008    Stroke 2009    no residual weaknesses     Past Surgical History:   Procedure Laterality Date    RADIOFREQUENCY ABLATION  01/08/2008    for atrial flutter     Family History    Problem Relation Age of Onset    Hypertension Mother     Stroke Mother     Hypertension Father     Alcohol abuse Father     Hypertension Sister     Hypertension Brother      Social History   Substance Use Topics    Smoking status: Never Smoker    Smokeless tobacco: Never Used    Alcohol use No     Review of Systems   Musculoskeletal: Positive for back pain (Chronic).   Neurological: Positive for headaches.   All other systems reviewed and are negative.      Physical Exam     Initial Vitals [05/03/18 2143]   BP Pulse Resp Temp SpO2   (!) 157/87 91 18 98.4 °F (36.9 °C) (!) 94 %      MAP       110.33         Physical Exam  Gen / constitutional: Interactive. No acute distress  Head: Normocephalic, reports tenderness along right parietal scalp area, but no bony stepoff of the calvarium or ecchymosis or erythema or swelling in this area.  Neck: supple, no masses or LAD (see MSK exam for cervical spine details)  Eyes: PERRLA, conjunctiva clear, EOM's intact  Ears, Nose and Throat: No rhinorrhea, no septal hematoma, no malocclusion of the jaw,     no instability of the midface or mandible.  Cardiac: Reg rhythm, No murmur  Pulmonary: CTA Bilat, no wheezes, rhonchi, rales.  Chest wall stable with no focal     tenderness or sub-Q emphysema  GI: Abdomen soft, non-tender, non-distended; no rebound or guarding  : No CVA tenderness.  Musculoskeletal: Extremities warm, well perfused, no erythema, no edema      No midline C, T or L spine ttp or stepoff.       All joints of all extremities ranged w/o pain.   Skin: No rashes and no seatbelt sign on neck or abdomen.  Neuro: Alert and Oriented x 3; No focal motor or sensory deficits.      ED Course   Procedures  Labs Reviewed   PROTIME-INR - Abnormal; Notable for the following:        Result Value    Prothrombin Time 12.6 (*)     All other components within normal limits     CT Head Without Contrast   Final Result      No CT evidence of acute intracranial finding or  detrimental change when compared with 10/26/2017.         Electronically signed by: Gage Oleary MD   Date:    05/04/2018   Time:    00:26            EKG: A Fib with rate of 72, , no ANAID's, non-specific twave pattern, no STEMI (Unchanged when compared to prior)       Clinical Tests:  Labs Test(s) were ordered and reviewed by me.  Radiological study(s) were ordered and reviewed by me.  Medical test(s) were ordered and reviewed by me.      Patient with past medical history dilated cardiomyopathy on Coumadin presents after a mechanical ground level fall where he hit the right side of his head.  He denies LOC.  Right now he denies headache or nausea.  I considered: ICH, intrathoracic injury, intraabdominal injury, spinal injury, fracture or dislocation of the extremities.  Given that he is on Coumadin and hit his head I felt he warranted head CT.  There is no midline cervical thoracic or lumbar spinal tenderness and he has a normal neurologic exam.  I did consider spinal injury but felt this was very unlikely given history and exam.  I also considered cardiac dysrhythmia, MI/ACS, CHF exacerbation given the patient's history of dilated cardiomyopathy though ultimately felt these were unlikely based on history and exam.    On labs the patient's INR is subtherapeutic.  I asked the patient if he has his warfarin at home and he said he did and has been taking it as prescribed.  His next INR check in coumadin clinic early next week.  I encouraged him to continue taking it as prescribed and follow up in coumadin clinic for INR checks as scheduled and he agreed to do this.      CT head negative     Ultimately I felt the patient had a closed head injury. I felt he was stable for discharge. He does have a mild headache.  Will prescribe Fioricet for headache.    The patient was given strict return precautions and follow up instructions and expressed understanding of these.  The patient felt comfortable with the plan for  discharge and I did as well.  Stable for DC.                              Scribe Attestation:   Scribe #1: I performed the above scribed service and the documentation accurately describes the services I performed. I attest to the accuracy of the note.    I, Dr. Bryan Kim, personally performed the services described in this documentation. All medical record entries made by the scribe were at my direction and in my presence.  I have reviewed the chart and agree that the record reflects my personal performance and is accurate and complete. Bryan Kim MD.  12:36 AM 05/04/2018               Clinical Impression:   The primary encounter diagnosis was Closed head injury, initial encounter. A diagnosis of Fall was also pertinent to this visit.                           Bryan Kim MD  05/04/18 0036

## 2018-05-04 NOTE — DISCHARGE INSTRUCTIONS
Continue taking your coumadin as prescribed and follow up in coumadin clinic next week as scheduled.

## 2018-05-04 NOTE — ED NOTES
Patient reports falling and hitting head on cement tonight, states that he takes Warfarin daily, reports headache, denies blurred vision or dizziness. Denies loss of conscious. Patient ambulatory.

## 2018-07-10 ENCOUNTER — HOSPITAL ENCOUNTER (EMERGENCY)
Facility: HOSPITAL | Age: 53
Discharge: HOME | End: 2018-07-11
Attending: EMERGENCY MEDICINE
Payer: MEDICAID

## 2018-07-10 DIAGNOSIS — M25.572 LEFT ANKLE PAIN: ICD-10-CM

## 2018-07-10 DIAGNOSIS — R60.0 LEG EDEMA, LEFT: ICD-10-CM

## 2018-07-10 PROCEDURE — 99285 EMERGENCY DEPT VISIT HI MDM: CPT | Mod: ,,, | Performed by: PHYSICIAN ASSISTANT

## 2018-07-10 PROCEDURE — 96376 TX/PRO/DX INJ SAME DRUG ADON: CPT

## 2018-07-10 PROCEDURE — 96374 THER/PROPH/DIAG INJ IV PUSH: CPT

## 2018-07-10 PROCEDURE — 99285 EMERGENCY DEPT VISIT HI MDM: CPT | Mod: 25

## 2018-07-11 VITALS
OXYGEN SATURATION: 93 % | HEART RATE: 77 BPM | SYSTOLIC BLOOD PRESSURE: 145 MMHG | HEIGHT: 69 IN | RESPIRATION RATE: 16 BRPM | BODY MASS INDEX: 39.4 KG/M2 | TEMPERATURE: 98 F | WEIGHT: 266 LBS | DIASTOLIC BLOOD PRESSURE: 92 MMHG

## 2018-07-11 PROBLEM — M25.572 LEFT ANKLE PAIN: Status: ACTIVE | Noted: 2018-07-11

## 2018-07-11 LAB
ABO + RH BLD: NORMAL
ALBUMIN SERPL BCP-MCNC: 4.2 G/DL
ALP SERPL-CCNC: 162 U/L
ALT SERPL W/O P-5'-P-CCNC: 10 U/L
ANION GAP SERPL CALC-SCNC: 14 MMOL/L
APPEARANCE FLD: NORMAL
AST SERPL-CCNC: 18 U/L
BASOPHILS # BLD AUTO: 0.04 K/UL
BASOPHILS NFR BLD: 0.4 %
BILIRUB SERPL-MCNC: 4.2 MG/DL
BLD GP AB SCN CELLS X3 SERPL QL: NORMAL
BODY FLD TYPE: NORMAL
BODY FLD TYPE: NORMAL
BUN SERPL-MCNC: 16 MG/DL
CALCIUM SERPL-MCNC: 10.2 MG/DL
CHLORIDE SERPL-SCNC: 99 MMOL/L
CO2 SERPL-SCNC: 28 MMOL/L
COLOR FLD: NORMAL
CREAT SERPL-MCNC: 1.3 MG/DL
CRP SERPL-MCNC: 43.8 MG/L
CRYSTALS FLD MICRO: NEGATIVE
DIFFERENTIAL METHOD: ABNORMAL
EOSINOPHIL # BLD AUTO: 0.1 K/UL
EOSINOPHIL NFR BLD: 1 %
ERYTHROCYTE [DISTWIDTH] IN BLOOD BY AUTOMATED COUNT: 13.4 %
ERYTHROCYTE [SEDIMENTATION RATE] IN BLOOD BY WESTERGREN METHOD: 42 MM/HR
EST. GFR  (AFRICAN AMERICAN): >60 ML/MIN/1.73 M^2
EST. GFR  (NON AFRICAN AMERICAN): >60 ML/MIN/1.73 M^2
GLUCOSE SERPL-MCNC: 89 MG/DL
HCT VFR BLD AUTO: 38 %
HGB BLD-MCNC: 12.3 G/DL
IMM GRANULOCYTES # BLD AUTO: 0.03 K/UL
IMM GRANULOCYTES NFR BLD AUTO: 0.3 %
INR PPP: 1.4
LYMPHOCYTES # BLD AUTO: 1.2 K/UL
LYMPHOCYTES NFR BLD: 13.5 %
LYMPHOCYTES NFR FLD MANUAL: 2 %
MCH RBC QN AUTO: 28.6 PG
MCHC RBC AUTO-ENTMCNC: 32.4 G/DL
MCV RBC AUTO: 88 FL
MONOCYTES # BLD AUTO: 1.8 K/UL
MONOCYTES NFR BLD: 19.8 %
MONOS+MACROS NFR FLD MANUAL: 25 %
NEUTROPHILS # BLD AUTO: 5.8 K/UL
NEUTROPHILS NFR BLD: 65 %
NEUTROPHILS NFR FLD MANUAL: 73 %
NRBC BLD-RTO: 0 /100 WBC
PATH INTERP FLD-IMP: NORMAL
PLATELET # BLD AUTO: 166 K/UL
PMV BLD AUTO: 10.8 FL
POTASSIUM SERPL-SCNC: 3.4 MMOL/L
PROCALCITONIN SERPL IA-MCNC: 0.23 NG/ML
PROT SERPL-MCNC: 8.7 G/DL
PROTHROMBIN TIME: 14 SEC
RBC # BLD AUTO: 4.3 M/UL
SODIUM SERPL-SCNC: 141 MMOL/L
URATE SERPL-MCNC: 10.4 MG/DL
WBC # BLD AUTO: 8.93 K/UL
WBC # FLD: 5535 /CU MM

## 2018-07-11 PROCEDURE — 87070 CULTURE OTHR SPECIMN AEROBIC: CPT

## 2018-07-11 PROCEDURE — 85025 COMPLETE CBC W/AUTO DIFF WBC: CPT

## 2018-07-11 PROCEDURE — 87102 FUNGUS ISOLATION CULTURE: CPT

## 2018-07-11 PROCEDURE — 85651 RBC SED RATE NONAUTOMATED: CPT

## 2018-07-11 PROCEDURE — 86140 C-REACTIVE PROTEIN: CPT

## 2018-07-11 PROCEDURE — 80053 COMPREHEN METABOLIC PANEL: CPT

## 2018-07-11 PROCEDURE — 63600175 PHARM REV CODE 636 W HCPCS: Performed by: PHYSICIAN ASSISTANT

## 2018-07-11 PROCEDURE — 87075 CULTR BACTERIA EXCEPT BLOOD: CPT

## 2018-07-11 PROCEDURE — 85610 PROTHROMBIN TIME: CPT

## 2018-07-11 PROCEDURE — 84550 ASSAY OF BLOOD/URIC ACID: CPT

## 2018-07-11 PROCEDURE — 63600175 PHARM REV CODE 636 W HCPCS: Performed by: EMERGENCY MEDICINE

## 2018-07-11 PROCEDURE — 89060 EXAM SYNOVIAL FLUID CRYSTALS: CPT

## 2018-07-11 PROCEDURE — 86850 RBC ANTIBODY SCREEN: CPT

## 2018-07-11 PROCEDURE — 84145 PROCALCITONIN (PCT): CPT

## 2018-07-11 PROCEDURE — 87206 SMEAR FLUORESCENT/ACID STAI: CPT

## 2018-07-11 PROCEDURE — 89051 BODY FLUID CELL COUNT: CPT

## 2018-07-11 PROCEDURE — 87116 MYCOBACTERIA CULTURE: CPT

## 2018-07-11 RX ORDER — MORPHINE SULFATE 4 MG/ML
4 INJECTION, SOLUTION INTRAMUSCULAR; INTRAVENOUS
Status: COMPLETED | OUTPATIENT
Start: 2018-07-11 | End: 2018-07-11

## 2018-07-11 RX ORDER — PREDNISONE 20 MG/1
TABLET ORAL
Qty: 7 TABLET | Refills: 0 | Status: SHIPPED | OUTPATIENT
Start: 2018-07-11 | End: 2019-01-31

## 2018-07-11 RX ORDER — TRAMADOL HYDROCHLORIDE 50 MG/1
50 TABLET ORAL EVERY 6 HOURS PRN
Qty: 12 TABLET | Refills: 0 | Status: SHIPPED | OUTPATIENT
Start: 2018-07-11 | End: 2018-07-21

## 2018-07-11 RX ORDER — ACETAMINOPHEN AND CODEINE PHOSPHATE 300; 30 MG/1; MG/1
1 TABLET ORAL
Status: DISCONTINUED | OUTPATIENT
Start: 2018-07-11 | End: 2018-07-11

## 2018-07-11 RX ORDER — HYDROCODONE BITARTRATE AND ACETAMINOPHEN 5; 325 MG/1; MG/1
1 TABLET ORAL
Status: DISCONTINUED | OUTPATIENT
Start: 2018-07-11 | End: 2018-07-11

## 2018-07-11 RX ADMIN — MORPHINE SULFATE 4 MG: 4 INJECTION INTRAVENOUS at 02:07

## 2018-07-11 RX ADMIN — MORPHINE SULFATE 4 MG: 4 INJECTION INTRAVENOUS at 05:07

## 2018-07-11 NOTE — SUBJECTIVE & OBJECTIVE
Past Medical History:   Diagnosis Date    Anticoagulant long-term use     Cardiomegaly     CHF (congestive heart failure)     Chronic combined systolic and diastolic congestive heart failure     Coronary artery disease     CVA (cerebrovascular accident)     Heart attack 2006    Hypertension     Hyperthyroidism, subclinical 1/2/2013    MI (myocardial infarction) 9/22/2013    MI in 2009      Paroxysmal atrial fibrillation     PE (pulmonary embolism) 1/1/2013    IN 2010     S/P ablation of atrial flutter 2008    Stroke 2009    no residual weaknesses       Past Surgical History:   Procedure Laterality Date    RADIOFREQUENCY ABLATION  01/08/2008    for atrial flutter       Review of patient's allergies indicates:   Allergen Reactions    Acetaminophen      Itching    Oxycodone-acetaminophen      Other reaction(s): Itching    Ace inhibitors Other (See Comments)     cough       No current facility-administered medications for this encounter.      Current Outpatient Prescriptions   Medication Sig    aspirin (ECOTRIN) 81 MG EC tablet Take 81 mg by mouth once daily.    carvedilol (COREG) 25 MG tablet Take 25 mg by mouth 2 (two) times daily.    cyclobenzaprine (FLEXERIL) 10 MG tablet Take 1 tablet (10 mg total) by mouth 3 (three) times daily as needed for Muscle spasms.    lidocaine (LIDODERM) 5 % Place 1 patch onto the skin once daily. Remove & Discard patch within 12 hours or as directed by MD    metOLazone (ZAROXOLYN) 2.5 MG tablet Take 1 tablet (2.5 mg total) by mouth once daily.    nitroGLYCERIN (NITROSTAT) 0.4 MG SL tablet Place 1 tablet (0.4 mg total) under the tongue every 5 (five) minutes as needed for Chest pain. Tablet, Sublingual Sublingual    oxyCODONE-acetaminophen (PERCOCET)  mg per tablet Take 1 tablet by mouth every 4 (four) hours as needed for Pain.    torsemide (DEMADEX) 20 MG Tab Take 2 tablets (40 mg total) by mouth 2 (two) times daily.    traMADol (ULTRAM) 50 mg tablet Take  "1-2 tablets ( mg total) by mouth every 6 (six) hours as needed for Pain.    gabapentin (NEURONTIN) 400 MG capsule Take 1 capsule (400 mg total) by mouth 2 (two) times daily. For chronic heel/foot pain    isosorbide dinitrate (ISORDIL) 20 MG tablet Take 1 tablet (20 mg total) by mouth 3 (three) times daily.    losartan (COZAAR) 25 MG tablet Take 0.5 tablets (12.5 mg total) by mouth once daily.    predniSONE (DELTASONE) 20 MG tablet Take 40mg for 2 days  Take 20mg for 2 days  Take 10mg for 2 days    traMADol (ULTRAM) 50 mg tablet Take 1 tablet (50 mg total) by mouth every 6 (six) hours as needed for Pain.    warfarin (COUMADIN) 7.5 MG tablet Take 1.5 tablets (11.25 mg total) by mouth Daily. (Patient taking differently: Take 12 mg by mouth Daily. Total 12 mg daily)     Family History     Problem Relation (Age of Onset)    Alcohol abuse Father    Hypertension Mother, Father, Sister, Brother    Stroke Mother        Social History Main Topics    Smoking status: Never Smoker    Smokeless tobacco: Never Used    Alcohol use No    Drug use: No    Sexual activity: No     ROS     Constitutional: negative for fevers  Eyes: no visual changes  ENT: negative for hearing loss  Respiratory: negative for dyspnea  Cardiovascular: negative for chest pain  Gastrointestinal: negative for abdominal pain  Genitourinary: negative for dysuria  Neurological: negative for headaches  Behavioral/Psych: negative for hallucinations  Endocrine: negative for temperature intolerance    Objective:     Vital Signs (Most Recent):  Temp: 97.8 °F (36.6 °C) (07/11/18 0745)  Pulse: 77 (07/11/18 0745)  Resp: 16 (07/11/18 0745)  BP: (!) 145/92 (07/11/18 0745)  SpO2: (!) 93 % (07/11/18 0613) Vital Signs (24h Range):  Temp:  [97.8 °F (36.6 °C)-98.8 °F (37.1 °C)] 97.8 °F (36.6 °C)  Pulse:  [75-96] 77  Resp:  [16-23] 16  SpO2:  [93 %-98 %] 93 %  BP: (122-192)/(72-92) 145/92     Weight: 120.7 kg (266 lb)  Height: 5' 9" (175.3 cm)  Body mass index " is 39.28 kg/m².    No intake or output data in the 24 hours ending 07/11/18 0748    Ortho/SPM Exam     Gen:  No acute distress  CV:  Peripherally well-perfused.  Pulses 2+ bilaterally.  Lungs:  Normal respiratory effort.  Abdomen:  Soft, non-tender, non-distended  Head/Neck:  Normocephalic.  Atraumatic. No TTP, AROM and PROM intact without pain  Neuro:  CN intact without deficit, SILT throughout B/L Upper & Lower Extremities  Pelvis: No TTP    MSK:  Bilateral UE:  - no obvious deformity no ttp  - AROM and PROM of the intact  - AIN/PIN/Radial/Median/Ulnar Nerves assessed in isolation without deficit  - SILT throughout  - Radial & Ulnar arteries palpated 2+  - Capillary Refill <3s    LLE:  - there is significant swelling of the left lower leg up to about midway up the calf, very dry skin  - AROM and PROM of hip and knee is intact and without pain  -ankle with some pain with ROM  - TA/EHL/Gastroc/FHL assessed in isolation without deficit  - SILT throughout  - DP and PT palpated  2+  - Capillary Refill <3s        Significant Labs:   CBC:   Recent Labs  Lab 07/11/18 0218   WBC 8.93   HGB 12.3*   HCT 38.0*        CMP:   Recent Labs  Lab 07/11/18 0218      K 3.4*   CL 99   CO2 28   GLU 89   BUN 16   CREATININE 1.3   CALCIUM 10.2   PROT 8.7*   ALBUMIN 4.2   BILITOT 4.2*   ALKPHOS 162*   AST 18   ALT 10   ANIONGAP 14   EGFRNONAA >60.0     CRP:   Recent Labs  Lab 07/11/18 0218   CRP 43.8*     All pertinent labs within the past 24 hours have been reviewed.    Significant Imaging: I have reviewed and interpreted all pertinent imaging results/findings.   No acute fracture

## 2018-07-11 NOTE — ED NOTES
Pt notified that he will have to be placed into a gown without under pants on, but pt reluctant to take off pants.  tech and AYESHA Montalvo, notified that he is refusing to take off pants for U/S.

## 2018-07-11 NOTE — ED NOTES
Patient resting on stretcher. RR spontaneous, even, and unlabored. Bed locked in low position, with side rails up x2 for safety. Call bell within reach. Denies needing toileting. Denies pain. Patient aware of POC, and has no complaints at this time. Will continue to monitor.

## 2018-07-11 NOTE — ASSESSMENT & PLAN NOTE
Hamelt Terrell is a 52 y.o. male with left ankle pain and swelling.    -left ankle joint aspirated and 4cc of fluid was obtained  -ESR and CRP are elevated 42 and 43.8 but patient is afebrile, wbc wnl, procalcitonin wnl and Cell count at 5535 with 73% segs, and no crystals  -no concern for septic arthritis at this time and no acute orthopaedic surgical intervention required

## 2018-07-11 NOTE — CONSULTS
[facility]  Orthopedics  Consult Note    Patient Name: Hamlet Terrell  MRN: [id number]  Admission Date: 7/10/2018  Hospital Length of Stay: 0 days  Attending Provider: Cristofer Rosario MD  Primary Care Provider: Carlin Swift MD    Patient information was obtained from patient and ER records.     Inpatient consult to Orthopedic Surgery  Consult performed by: JEEVAN CHRISTINA  Consult ordered by: MICHAEL HALL        Subjective:     Principal Problem:<principal problem not specified>    Chief Complaint:   Chief Complaint   Patient presents with    Ankle Pain     Pt c/o left ankle pain/swelling starting last night. Pt has hx of CHF and takes diuretics at home.     Leg Swelling        HPI: Hamlet Terrell is a 52 y.o. male who presents to the ED for left ankle pain and swelling.  Patient states that the other day he started having swelling and pain at his left ankle and pain with ROM.  He has had difficulty bearing weight.  The swelling worsened yesterday evening and he decided to come to the ED.  Orthopaedics was consulted for concern for septic arthritis of the left ankle.  Patient denies pain anywhere else, denies numbness and tingling.    Past Medical History:   Diagnosis Date    Anticoagulant long-term use     Cardiomegaly     CHF (congestive heart failure)     Chronic combined systolic and diastolic congestive heart failure     Coronary artery disease     CVA (cerebrovascular accident)     Heart attack 2006    Hypertension     Hyperthyroidism, subclinical 1/2/2013    MI (myocardial infarction) 9/22/2013    MI in 2009      Paroxysmal atrial fibrillation     PE (pulmonary embolism) 1/1/2013    IN 2010     S/P ablation of atrial flutter 2008    Stroke 2009    no residual weaknesses       Past Surgical History:   Procedure Laterality Date    RADIOFREQUENCY ABLATION  01/08/2008    for atrial flutter       Review of patient's allergies indicates:   Allergen Reactions     Acetaminophen      Itching    Oxycodone-acetaminophen      Other reaction(s): Itching    Ace inhibitors Other (See Comments)     cough       No current facility-administered medications for this encounter.      Current Outpatient Prescriptions   Medication Sig    aspirin (ECOTRIN) 81 MG EC tablet Take 81 mg by mouth once daily.    carvedilol (COREG) 25 MG tablet Take 25 mg by mouth 2 (two) times daily.    cyclobenzaprine (FLEXERIL) 10 MG tablet Take 1 tablet (10 mg total) by mouth 3 (three) times daily as needed for Muscle spasms.    lidocaine (LIDODERM) 5 % Place 1 patch onto the skin once daily. Remove & Discard patch within 12 hours or as directed by MD    metOLazone (ZAROXOLYN) 2.5 MG tablet Take 1 tablet (2.5 mg total) by mouth once daily.    nitroGLYCERIN (NITROSTAT) 0.4 MG SL tablet Place 1 tablet (0.4 mg total) under the tongue every 5 (five) minutes as needed for Chest pain. Tablet, Sublingual Sublingual    oxyCODONE-acetaminophen (PERCOCET)  mg per tablet Take 1 tablet by mouth every 4 (four) hours as needed for Pain.    torsemide (DEMADEX) 20 MG Tab Take 2 tablets (40 mg total) by mouth 2 (two) times daily.    traMADol (ULTRAM) 50 mg tablet Take 1-2 tablets ( mg total) by mouth every 6 (six) hours as needed for Pain.    gabapentin (NEURONTIN) 400 MG capsule Take 1 capsule (400 mg total) by mouth 2 (two) times daily. For chronic heel/foot pain    isosorbide dinitrate (ISORDIL) 20 MG tablet Take 1 tablet (20 mg total) by mouth 3 (three) times daily.    losartan (COZAAR) 25 MG tablet Take 0.5 tablets (12.5 mg total) by mouth once daily.    predniSONE (DELTASONE) 20 MG tablet Take 40mg for 2 days  Take 20mg for 2 days  Take 10mg for 2 days    traMADol (ULTRAM) 50 mg tablet Take 1 tablet (50 mg total) by mouth every 6 (six) hours as needed for Pain.    warfarin (COUMADIN) 7.5 MG tablet Take 1.5 tablets (11.25 mg total) by mouth Daily. (Patient taking differently: Take 12 mg by  "mouth Daily. Total 12 mg daily)     Family History     Problem Relation (Age of Onset)    Alcohol abuse Father    Hypertension Mother, Father, Sister, Brother    Stroke Mother        Social History Main Topics    Smoking status: Never Smoker    Smokeless tobacco: Never Used    Alcohol use No    Drug use: No    Sexual activity: No     ROS     Constitutional: negative for fevers  Eyes: no visual changes  ENT: negative for hearing loss  Respiratory: negative for dyspnea  Cardiovascular: negative for chest pain  Gastrointestinal: negative for abdominal pain  Genitourinary: negative for dysuria  Neurological: negative for headaches  Behavioral/Psych: negative for hallucinations  Endocrine: negative for temperature intolerance    Objective:     Vital Signs (Most Recent):  Temp: 97.8 °F (36.6 °C) (07/11/18 0745)  Pulse: 77 (07/11/18 0745)  Resp: 16 (07/11/18 0745)  BP: (!) 145/92 (07/11/18 0745)  SpO2: (!) 93 % (07/11/18 0613) Vital Signs (24h Range):  Temp:  [97.8 °F (36.6 °C)-98.8 °F (37.1 °C)] 97.8 °F (36.6 °C)  Pulse:  [75-96] 77  Resp:  [16-23] 16  SpO2:  [93 %-98 %] 93 %  BP: (122-192)/(72-92) 145/92     Weight: 120.7 kg (266 lb)  Height: 5' 9" (175.3 cm)  Body mass index is 39.28 kg/m².    No intake or output data in the 24 hours ending 07/11/18 0748    Ortho/SPM Exam     Gen:  No acute distress  CV:  Peripherally well-perfused.  Pulses 2+ bilaterally.  Lungs:  Normal respiratory effort.  Abdomen:  Soft, non-tender, non-distended  Head/Neck:  Normocephalic.  Atraumatic. No TTP, AROM and PROM intact without pain  Neuro:  CN intact without deficit, SILT throughout B/L Upper & Lower Extremities  Pelvis: No TTP    MSK:  Bilateral UE:  - no obvious deformity no ttp  - AROM and PROM of the intact  - AIN/PIN/Radial/Median/Ulnar Nerves assessed in isolation without deficit  - SILT throughout  - Radial & Ulnar arteries palpated 2+  - Capillary Refill <3s    LLE:  - there is significant swelling of the left lower leg up " to about midway up the calf, very dry skin  - AROM and PROM of hip and knee is intact and without pain  -ankle with some pain with ROM  - TA/EHL/Gastroc/FHL assessed in isolation without deficit  - SILT throughout  - DP and PT palpated  2+  - Capillary Refill <3s        Significant Labs:   CBC:   Recent Labs  Lab 07/11/18 0218   WBC 8.93   HGB 12.3*   HCT 38.0*        CMP:   Recent Labs  Lab 07/11/18 0218      K 3.4*   CL 99   CO2 28   GLU 89   BUN 16   CREATININE 1.3   CALCIUM 10.2   PROT 8.7*   ALBUMIN 4.2   BILITOT 4.2*   ALKPHOS 162*   AST 18   ALT 10   ANIONGAP 14   EGFRNONAA >60.0     CRP:   Recent Labs  Lab 07/11/18 0218   CRP 43.8*     All pertinent labs within the past 24 hours have been reviewed.    Significant Imaging: I have reviewed and interpreted all pertinent imaging results/findings.   No acute fracture    Assessment/Plan:     Left ankle pain    Hamlet Terrell is a 52 y.o. male with left ankle pain and swelling.    -left ankle joint aspirated and 4cc of fluid was obtained  -ESR and CRP are elevated 42 and 43.8 but patient is afebrile, wbc wnl, procalcitonin wnl and Cell count at 5535 with 73% segs, and no crystals  -no concern for septic arthritis at this time and no acute orthopaedic surgical intervention required            Thank you for your consult. I will follow-up with patient. Please contact us if you have any additional questions.    Tru Knapp MD  Orthopedics  [facility]

## 2018-07-11 NOTE — HPI
Hamlet Terrell is a 52 y.o. male who presents to the ED for left ankle pain and swelling.  Patient states that the other day he started having swelling and pain at his left ankle and pain with ROM.  He has had difficulty bearing weight.  The swelling worsened yesterday evening and he decided to come to the ED.  Orthopaedics was consulted for concern for septic arthritis of the left ankle.  Patient denies pain anywhere else, denies numbness and tingling.

## 2018-07-11 NOTE — ED NOTES
Went in to give pt ordered pain med despite allergy (per PA) since pt only reaction to tylenol is itching. Pt states he cannot take anything with tylenol in it. AYESHA Montalvo, notified.

## 2018-07-11 NOTE — ED NOTES
"Chief Complaint   Patient presents with    Ankle Pain     Pt c/o left ankle pain/swelling starting last night. Pt has hx of CHF and takes diuretics at home.     Leg Swelling     Pt presents to ED with c/o left ankle pain and swelling since he fell when he "got out of the bed wrong last night." Pt has moderate swelling (1+) throughout to BLE. Pulses intact and strong.    Adult physical assessment:    Neuro: Patient AAOx4.   HEENT: PERRLA, no purulent drainage to nares, throat, eyes.   Cardiac: No C/O CP at this time.  Respiratory: Airway is open and patent. No C/O SOB at this time.  Pulses: +2 x 4 extremities.  Musculoskeletal: Patient moving all extremities spontaneously. C/O pain to LLE.     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    Intercostal pain 11/18/2013    Chronic systolic heart failure 11/18/2013    TAPAN (obstructive sleep apnea) 01/11/2015    Syncope, tussive 01/15/2015    Post-nasal drip 01/15/2015    Acute on chronic systolic CHF (congestive heart failure) 05/09/2015    Back pain 07/06/2015    Noncompliance with diet and medication regimen 11/25/2015    Edema 05/19/2016    Coronary artery disease 05/31/2016    Chronic kidney disease 05/31/2016    JEAN-PIERRE (acute kidney injury) 06/03/2016    Normocytic anemia 12/10/2016    Spell of transient neurologic symptoms 12/13/2016    Leg swelling 04/01/2017    Hypokalemia 04/03/2017    Heel pain, bilateral " 04/05/2017    Blurry vision, bilateral 10/26/2017    Exotropia 10/26/2017    Monocular diplopia of both eyes 10/26/2017    Chest pain 10/31/2017     Resolved Ambulatory Problems     Diagnosis Date Noted    Acute bronchitis 01/01/2013    PE (pulmonary embolism) 01/01/2013    Elevated CPK 01/02/2013    Cough 02/24/2013    Shortness of breath 02/24/2013    Abdominal pain, right lower quadrant 09/22/2013    MI (myocardial infarction) 09/22/2013    Acute unilateral obstructive uropathy 09/22/2013    Elevated INR 09/22/2013    Syncope 02/13/2014    Elevated troponin 01/11/2015    Bronchitis 01/11/2015    Cellulitis 11/25/2015    Subtherapeutic international normalized ratio (INR) 12/27/2015    Hemoptysis 01/07/2016    NSVT (nonsustained ventricular tachycardia) 05/18/2016    Pain of right lower extremity 05/31/2016    LFTs abnormal 12/10/2016    Decreased visual acuity 10/26/2017     Past Medical History:   Diagnosis Date    Anticoagulant long-term use     Cardiomegaly     CHF (congestive heart failure)     Chronic combined systolic and diastolic congestive heart failure     Coronary artery disease     CVA (cerebrovascular accident)     Heart attack 2006    Hypertension     Hyperthyroidism, subclinical 1/2/2013    MI (myocardial infarction) 9/22/2013    Paroxysmal atrial fibrillation     PE (pulmonary embolism) 1/1/2013    S/P ablation of atrial flutter 2008    Stroke 2009     Review of patient's allergies indicates:   Allergen Reactions    Acetaminophen      Itching    Oxycodone-acetaminophen      Other reaction(s): Itching    Ace inhibitors Other (See Comments)     cough

## 2018-07-11 NOTE — ED PROVIDER NOTES
Encounter Date: 7/10/2018       History     Chief Complaint   Patient presents with    Ankle Pain     Pt c/o left ankle pain/swelling starting last night. Pt has hx of CHF and takes diuretics at home.     Leg Swelling     52-year-old male presents to the ER for evaluation of pain to the left ankle and swelling.  Around 11 PM on July 9, about 24 hours ago patient's was to the left ankle.  Since that time he has been unable to bear weight.  He has been using crutches to get around his home.  Pain began to get worse tonight and he developed swelling.  He denies any shortness of breath or chest pain. Symptoms localized to the Left ankle.           Review of patient's allergies indicates:   Allergen Reactions    Acetaminophen      Itching    Oxycodone-acetaminophen      Other reaction(s): Itching    Ace inhibitors Other (See Comments)     cough     Past Medical History:   Diagnosis Date    Anticoagulant long-term use     Cardiomegaly     CHF (congestive heart failure)     Chronic combined systolic and diastolic congestive heart failure     Coronary artery disease     CVA (cerebrovascular accident)     Heart attack 2006    Hypertension     Hyperthyroidism, subclinical 1/2/2013    MI (myocardial infarction) 9/22/2013    MI in 2009      Paroxysmal atrial fibrillation     PE (pulmonary embolism) 1/1/2013    IN 2010     S/P ablation of atrial flutter 2008    Stroke 2009    no residual weaknesses     Past Surgical History:   Procedure Laterality Date    RADIOFREQUENCY ABLATION  01/08/2008    for atrial flutter     Family History   Problem Relation Age of Onset    Hypertension Mother     Stroke Mother     Hypertension Father     Alcohol abuse Father     Hypertension Sister     Hypertension Brother      Social History   Substance Use Topics    Smoking status: Never Smoker    Smokeless tobacco: Never Used    Alcohol use No     Review of Systems   Constitutional: Negative for fever.   HENT: Negative  for sore throat.    Respiratory: Negative for shortness of breath.    Cardiovascular: Negative for chest pain.   Gastrointestinal: Negative for nausea.   Genitourinary: Negative for dysuria.   Musculoskeletal: Positive for arthralgias and gait problem. Negative for back pain.   Skin: Negative for rash.   Neurological: Negative for weakness.   Hematological: Does not bruise/bleed easily.       Physical Exam     Initial Vitals [07/10/18 2148]   BP Pulse Resp Temp SpO2   129/82 82 20 98.8 °F (37.1 °C) 96 %      MAP       --         Physical Exam    Constitutional: Vital signs are normal. He appears well-developed and well-nourished.   HENT:   Head: Normocephalic and atraumatic.   Eyes: Conjunctivae are normal.   Cardiovascular: Normal rate. An irregularly irregular rhythm present.   Abdominal: Soft. Normal appearance and bowel sounds are normal.   Musculoskeletal: Normal range of motion.   Swelling to the left ankle  Pain to palpation around the left ankle  Patient unable to move the left ankle secondary to pain  He will not allow me to perform any passive range of motion of the left ankle   Neurological: He is alert and oriented to person, place, and time.   Skin: Skin is warm and intact.   Psychiatric: He has a normal mood and affect. His speech is normal and behavior is normal. Cognition and memory are normal.         ED Course   Procedures  Labs Reviewed   CBC W/ AUTO DIFFERENTIAL - Abnormal; Notable for the following:        Result Value    RBC 4.30 (*)     Hemoglobin 12.3 (*)     Hematocrit 38.0 (*)     Mono # 1.8 (*)     Lymph% 13.5 (*)     Mono% 19.8 (*)     All other components within normal limits   COMPREHENSIVE METABOLIC PANEL - Abnormal; Notable for the following:     Potassium 3.4 (*)     Total Protein 8.7 (*)     Total Bilirubin 4.2 (*)     Alkaline Phosphatase 162 (*)     All other components within normal limits   C-REACTIVE PROTEIN - Abnormal; Notable for the following:     CRP 43.8 (*)     All other  components within normal limits   SEDIMENTATION RATE - Abnormal; Notable for the following:     Sed Rate 42 (*)     All other components within normal limits   PROTIME-INR - Abnormal; Notable for the following:     Prothrombin Time 14.0 (*)     INR 1.4 (*)     All other components within normal limits   URIC ACID - Abnormal; Notable for the following:     Uric Acid 10.4 (*)     All other components within normal limits    Narrative:     [id number]  Add on URIC per Edwina Rosario MD @ 05:02  07/11/2018   ADD ON TEST PROCALCITONIN PER DR EDWINA ROSARIO ORDER #[id number]   07/11/2018  05:12    CULTURE, AEROBIC  (SPECIFY SOURCE)   AFB CULTURE & SMEAR   CULTURE, ANAEROBIC   CULTURE, FUNGUS   URIC ACID   BODY FLUID CRYSTAL   WBC & DIFF,BODY FLUID   PROCALCITONIN   PROCALCITONIN    Narrative:     [id number]  Add on URIC per Edwina Rosario MD @ 05:02  07/11/2018   ADD ON TEST PROCALCITONIN PER DR EDWINA ROSARIO ORDER #[id number]   07/11/2018  05:12    PATHOLOGIST REVIEW, BODY FLUID   TYPE & SCREEN          Imaging Results          US Lower Extremity Veins Bilateral (Final result)  Result time 07/11/18 04:12:14    Final result by Gage Oleary MD (07/11/18 04:12:14)                 Impression:      No evidence of deep venous thrombosis in either lower extremity.    Electronically signed by resident: Blaire Ramos  Date:    07/11/2018  Time:    04:05    Electronically signed by: Gage Oleary MD  Date:    07/11/2018  Time:    04:12             Narrative:    EXAMINATION:  US LOWER EXTREMITY VEINS BILATERAL    CLINICAL HISTORY:  Localized edema.    TECHNIQUE:  Duplex and color flow Doppler and dynamic compression was performed of the bilateral lower extremity veins was performed.    COMPARISON:  Ultrasound lower extremity veins 06/14/2017.    FINDINGS:  Right thigh veins: The common femoral, femoral, popliteal, upper greater saphenous, and deep femoral veins are patent and free of thrombus. The veins are normally  compressible and have normal phasic flow and augmentation response.    Right calf veins: The visualized calf veins are patent.    Left thigh veins: The common femoral, femoral, popliteal, upper greater saphenous, and deep femoral veins are patent and free of thrombus. The veins are normally compressible and have normal phasic flow and augmentation response.    Left calf veins: The visualized calf veins are patent.                               X-Ray Ankle Complete Left (Final result)  Result time 07/11/18 01:06:03    Final result by Gage Oleary MD (07/11/18 01:06:03)                 Impression:      Moderate soft tissue swelling about the ankle.  No displaced fracture.      Electronically signed by: Gage Oleary MD  Date:    07/11/2018  Time:    01:06             Narrative:    EXAMINATION:  XR ANKLE COMPLETE 3 VIEW LEFT; XR TIBIA FIBULA 2 VIEW LEFT    CLINICAL HISTORY:  Pain in left ankle and joints of left foot    TECHNIQUE:  Three views of the left ankle and two views of the left tibia and fibula.    COMPARISON:  None.    FINDINGS:  No acute fracture, dislocation, or bony erosion. Normal alignment at the knee and ankle.  Moderate soft tissue swelling is noted about the ankle joint.  Small anterior joint effusion.  No radiopaque foreign body.                               X-Ray Tibia Fibula 2 View Left (Final result)  Result time 07/11/18 01:06:03    Final result by Gage Oleary MD (07/11/18 01:06:03)                 Impression:      Moderate soft tissue swelling about the ankle.  No displaced fracture.      Electronically signed by: Gage Oleary MD  Date:    07/11/2018  Time:    01:06             Narrative:    EXAMINATION:  XR ANKLE COMPLETE 3 VIEW LEFT; XR TIBIA FIBULA 2 VIEW LEFT    CLINICAL HISTORY:  Pain in left ankle and joints of left foot    TECHNIQUE:  Three views of the left ankle and two views of the left tibia and fibula.    COMPARISON:  None.    FINDINGS:  No acute fracture,  dislocation, or bony erosion. Normal alignment at the knee and ankle.  Moderate soft tissue swelling is noted about the ankle joint.  Small anterior joint effusion.  No radiopaque foreign body.                               X-Ray Foot Complete Left (Final result)  Result time 07/11/18 01:10:24    Final result by Gage Oleary MD (07/11/18 01:10:24)                 Impression:      No acute bony abnormality.      Electronically signed by: Gage Oleary MD  Date:    07/11/2018  Time:    01:10             Narrative:    EXAMINATION:  XR FOOT COMPLETE 3 VIEW LEFT    CLINICAL HISTORY:  Pain in left ankle and joints of left foot    TECHNIQUE:  Three views of the left foot.    COMPARISON:  Left foot radiographs, 01/20/2016.    FINDINGS:  No acute fracture, dislocation, or bony erosion. Mild soft tissue swelling over the dorsum of the foot.  No radiopaque foreign body.                                 Medical Decision Making:   ED Management:  52-year-old male with left ankle pain after a twisting mechanism of injury.   Will get an x-ray and reassess  No acute findings on Xray  Pt still reluctant to move the L ankle  Will get labs, inflammatory marker and reassess    INR is subtherapeutic Will get US   Ultrasound shows no evidence of DVT  Patient is still reluctant to perform any range of motion  I suspect gouty arthritis versus a septic joint.  I will contact orthopedic surgery  Orthopedic Surgery performed an aspiration, No evidence of septic arthritis.   Pt treated for gouty arthritis, will DC home                       Clinical Impression:   Diagnoses of Left ankle pain, Left ankle pain, Left ankle pain, and Leg edema, left were pertinent to this visit.      Disposition:   Disposition: Discharged  Condition: Stable                        Selvin Muñiz PA-C  07/11/18 2056

## 2018-07-14 LAB — BACTERIA SPEC AEROBE CULT: NO GROWTH

## 2018-07-18 LAB — BACTERIA SPEC ANAEROBE CULT: NORMAL

## 2018-07-28 ENCOUNTER — HOSPITAL ENCOUNTER (EMERGENCY)
Facility: HOSPITAL | Age: 53
Discharge: HOME | End: 2018-07-29
Attending: EMERGENCY MEDICINE
Payer: MEDICAID

## 2018-07-28 VITALS
SYSTOLIC BLOOD PRESSURE: 126 MMHG | RESPIRATION RATE: 18 BRPM | TEMPERATURE: 99 F | OXYGEN SATURATION: 95 % | HEART RATE: 69 BPM | WEIGHT: 250 LBS | DIASTOLIC BLOOD PRESSURE: 60 MMHG | BODY MASS INDEX: 37.03 KG/M2 | HEIGHT: 69 IN

## 2018-07-28 DIAGNOSIS — S76.911A MUSCLE STRAIN OF RIGHT THIGH, INITIAL ENCOUNTER: Primary | ICD-10-CM

## 2018-07-28 PROCEDURE — 99284 EMERGENCY DEPT VISIT MOD MDM: CPT | Mod: ,,, | Performed by: NURSE PRACTITIONER

## 2018-07-28 PROCEDURE — 25000003 PHARM REV CODE 250: Performed by: PHYSICIAN ASSISTANT

## 2018-07-28 PROCEDURE — 99284 EMERGENCY DEPT VISIT MOD MDM: CPT

## 2018-07-28 RX ORDER — METHOCARBAMOL 750 MG/1
1500 TABLET, FILM COATED ORAL
Status: COMPLETED | OUTPATIENT
Start: 2018-07-28 | End: 2018-07-28

## 2018-07-28 RX ORDER — OXYCODONE HYDROCHLORIDE 5 MG/1
5 TABLET ORAL ONCE
Status: COMPLETED | OUTPATIENT
Start: 2018-07-29 | End: 2018-07-28

## 2018-07-28 RX ADMIN — OXYCODONE HYDROCHLORIDE 5 MG: 5 TABLET ORAL at 11:07

## 2018-07-28 RX ADMIN — METHOCARBAMOL 1500 MG: 750 TABLET ORAL at 10:07

## 2018-07-29 PROCEDURE — 25000003 PHARM REV CODE 250: Performed by: PHYSICIAN ASSISTANT

## 2018-07-29 RX ORDER — METHOCARBAMOL 500 MG/1
1000 TABLET, FILM COATED ORAL 3 TIMES DAILY
Qty: 30 TABLET | Refills: 0 | Status: SHIPPED | OUTPATIENT
Start: 2018-07-29 | End: 2018-08-03

## 2018-07-29 NOTE — ED NOTES
"History of Present Illness     Patient Identification  Hamlet Terrell is a 52 y.o. male.    Patient information was obtained from patient.  History/Exam limitations: none.  Patient presented to the Emergency Department by private vehicle; patient's brother brought him in.    Chief Complaint   Leg Pain (Patient states that he was working on his car today, felt a "pop" in his right upper thigh. States that if he puts pressure on the area it relieves the pain. Patient reports pain with movement. )    Patient states pain since earlier today; took 2 norco and 4 800 mg Ibuprofen for pain with no relief.  States to 8/10 pain [resently just standing in the room.  Patient is unable to sit down as pain increases.  Pain intensifies when palpated at the right groin.  Patient states he has never had a hernia before.      Past Medical History:   Diagnosis Date    Anticoagulant long-term use     Cardiomegaly     CHF (congestive heart failure)     Chronic combined systolic and diastolic congestive heart failure     Coronary artery disease     CVA (cerebrovascular accident)     Heart attack 2006    Hypertension     Hyperthyroidism, subclinical 1/2/2013    MI (myocardial infarction) 9/22/2013    MI in 2009      Paroxysmal atrial fibrillation     PE (pulmonary embolism) 1/1/2013    IN 2010     S/P ablation of atrial flutter 2008    Stroke 2009    no residual weaknesses     Family History   Problem Relation Age of Onset    Hypertension Mother     Stroke Mother     Hypertension Father     Alcohol abuse Father     Hypertension Sister     Hypertension Brother      No current facility-administered medications for this encounter.      Current Outpatient Prescriptions   Medication Sig Dispense Refill    aspirin (ECOTRIN) 81 MG EC tablet Take 81 mg by mouth once daily.      carvedilol (COREG) 25 MG tablet Take 25 mg by mouth 2 (two) times daily.      cyclobenzaprine (FLEXERIL) 10 MG tablet Take 1 tablet (10 mg total) " "by mouth 3 (three) times daily as needed for Muscle spasms. 20 tablet 0    gabapentin (NEURONTIN) 400 MG capsule Take 1 capsule (400 mg total) by mouth 2 (two) times daily. For chronic heel/foot pain 60 capsule 2    isosorbide dinitrate (ISORDIL) 20 MG tablet Take 1 tablet (20 mg total) by mouth 3 (three) times daily. 90 tablet 11    lidocaine (LIDODERM) 5 % Place 1 patch onto the skin once daily. Remove & Discard patch within 12 hours or as directed by MD 15 patch 0    losartan (COZAAR) 25 MG tablet Take 0.5 tablets (12.5 mg total) by mouth once daily. 15 tablet 2    metOLazone (ZAROXOLYN) 2.5 MG tablet Take 1 tablet (2.5 mg total) by mouth once daily. 90 tablet 3    nitroGLYCERIN (NITROSTAT) 0.4 MG SL tablet Place 1 tablet (0.4 mg total) under the tongue every 5 (five) minutes as needed for Chest pain. Tablet, Sublingual Sublingual 30 tablet 11    oxyCODONE-acetaminophen (PERCOCET)  mg per tablet Take 1 tablet by mouth every 4 (four) hours as needed for Pain. 12 tablet 0    predniSONE (DELTASONE) 20 MG tablet Take 40mg for 2 days  Take 20mg for 2 days  Take 10mg for 2 days 7 tablet 0    torsemide (DEMADEX) 20 MG Tab Take 2 tablets (40 mg total) by mouth 2 (two) times daily. 120 tablet 2    traMADol (ULTRAM) 50 mg tablet Take 1-2 tablets ( mg total) by mouth every 6 (six) hours as needed for Pain. 16 tablet 0    warfarin (COUMADIN) 7.5 MG tablet Take 1.5 tablets (11.25 mg total) by mouth Daily. (Patient taking differently: Take 12 mg by mouth Daily. Total 12 mg daily) 45 tablet 0     Review of patient's allergies indicates:   Allergen Reactions    Acetaminophen      Itching    Oxycodone-acetaminophen      Other reaction(s): Itching    Ace inhibitors Other (See Comments)     cough       Review of Systems  Pertinent items are noted in HPI.     Physical Exam     /60 (BP Location: Right arm, Patient Position: Standing)   Pulse 69   Temp 98.6 °F (37 °C) (Oral)   Resp 18   Ht 5' 9" " (1.753 m)   Wt 113.4 kg (250 lb)   SpO2 95%   BMI 36.92 kg/m²

## 2018-07-29 NOTE — ED PROVIDER NOTES
"Encounter Date: 7/28/2018       History     Chief Complaint   Patient presents with    Leg Pain     Patient states that he was working on his car today, felt a "pop" in his right upper thigh. States that if he puts pressure on the area it relieves the pain. Patient reports pain with movement.      Patient is a 52-year-old male with a past medical history of HTN, CVA, status post ablation of atrial flutter, hyperthyroidism, CHF, PE, MI who presents the ED with left thigh pain.  Patient states that he was fixing his truck.  He sat on a tire and as he was standing up, he heard a "pop" and developed acute onset of left anterior pain.  He complains of severe pain and has been unable to ambulate since.  He states that he took 2 Aleve and for ibuprofen prior to arrival for pain relief.  He denies any orthopedic surgeries.  He endorses SOB which is at baseline.  No cough, orthopnea, PND, chest pain.           Review of patient's allergies indicates:   Allergen Reactions    Acetaminophen      Itching    Oxycodone-acetaminophen      Other reaction(s): Itching    Ace inhibitors Other (See Comments)     cough     Past Medical History:   Diagnosis Date    Anticoagulant long-term use     Cardiomegaly     CHF (congestive heart failure)     Chronic combined systolic and diastolic congestive heart failure     Coronary artery disease     CVA (cerebrovascular accident)     Heart attack 2006    Hypertension     Hyperthyroidism, subclinical 1/2/2013    MI (myocardial infarction) 9/22/2013    MI in 2009      Paroxysmal atrial fibrillation     PE (pulmonary embolism) 1/1/2013    IN 2010     S/P ablation of atrial flutter 2008    Stroke 2009    no residual weaknesses     Past Surgical History:   Procedure Laterality Date    RADIOFREQUENCY ABLATION  01/08/2008    for atrial flutter     Family History   Problem Relation Age of Onset    Hypertension Mother     Stroke Mother     Hypertension Father     Alcohol abuse " Father     Hypertension Sister     Hypertension Brother      Social History   Substance Use Topics    Smoking status: Never Smoker    Smokeless tobacco: Never Used    Alcohol use No     Review of Systems   Constitutional: Negative for chills and fever.   HENT: Negative for ear pain and sore throat.    Eyes: Negative for photophobia.   Respiratory: Negative for shortness of breath.    Cardiovascular: Negative for chest pain.   Gastrointestinal: Negative for abdominal pain and nausea.   Genitourinary: Negative for dysuria and hematuria.   Musculoskeletal: Positive for myalgias. Negative for back pain and neck pain.   Skin: Negative for rash.   Neurological: Negative for weakness and headaches.   Hematological: Does not bruise/bleed easily.       Physical Exam     Initial Vitals [07/28/18 2113]   BP Pulse Resp Temp SpO2   126/60 69 18 98.6 °F (37 °C) 95 %      MAP       --         Physical Exam    Vitals reviewed.  Constitutional: He appears well-developed and well-nourished. He is not diaphoretic. He appears distressed.   HENT:   Head: Normocephalic and atraumatic.   Nose: Nose normal.   Eyes: Conjunctivae and EOM are normal.   Neck: Normal range of motion.   Cardiovascular: Normal rate, regular rhythm and normal heart sounds. Exam reveals no friction rub.    No murmur heard.  Pulses:       Dorsalis pedis pulses are 2+ on the right side.   Pulmonary/Chest: Breath sounds normal. No respiratory distress. He has no wheezes. He has no rales.   Abdominal: Soft. Bowel sounds are normal. He exhibits no distension. There is no tenderness.   Musculoskeletal:        Right hip: He exhibits decreased range of motion and decreased strength.        Right knee: He exhibits decreased range of motion. He exhibits no effusion, no deformity and no bony tenderness. No tenderness found. No patellar tendon tenderness noted.        Legs:  Neurological: He is alert and oriented to person, place, and time. He has normal strength. No  "sensory deficit.   Skin: Skin is warm and dry. No erythema.   Dry skin to lower extremities.    Psychiatric: He has a normal mood and affect. Thought content normal.         ED Course   Procedures  Labs Reviewed - No data to display       Imaging Results    None          Medical Decision Making:   History:   Old Medical Records: I decided to obtain old medical records.       APC / Resident Notes:   Patient is a 52-year-old male that presents the ED with right anterior thigh pain after hearing a "pop" when standing up from a seated position.    Will obtain MRI of hip and femur to assess for soft tissue injury, give robaxin and oxycodone po, and continue to monitor.     1:02 AM  Will sign patient out.  Assumed care of pt.  MRI shows acute myofascial strain at the mid and distal vastus lateralis with small adjacent intramuscular hematoma and perifascial edema.  Pt reassessed.  Updated on imaging results.  Patient is hemodynamically stable, vital signs are normal. Discharge instructions given. Prescription for Robaxin given and explained.   Return to ED precautions discussed. Pt advised to use ice and compression for comfort. Follow up as directed. Pt verbalized understanding of this plan. Pt is stable for discharge.                    Clinical Impression:   The encounter diagnosis was Muscle strain of right thigh, initial encounter.      Disposition:   Disposition: Discharged  Condition: Stable                        Yuki Villatoro NP  07/29/18 0301    "

## 2018-08-17 LAB — FUNGUS SPEC CULT: NORMAL

## 2018-08-31 ENCOUNTER — HOSPITAL ENCOUNTER (EMERGENCY)
Facility: HOSPITAL | Age: 53
Discharge: HOME | End: 2018-08-31
Attending: EMERGENCY MEDICINE
Payer: MEDICAID

## 2018-08-31 VITALS
OXYGEN SATURATION: 92 % | SYSTOLIC BLOOD PRESSURE: 152 MMHG | RESPIRATION RATE: 20 BRPM | TEMPERATURE: 98 F | HEIGHT: 69 IN | HEART RATE: 64 BPM | BODY MASS INDEX: 39.06 KG/M2 | DIASTOLIC BLOOD PRESSURE: 94 MMHG | WEIGHT: 263.69 LBS

## 2018-08-31 DIAGNOSIS — R06.02 SHORTNESS OF BREATH: ICD-10-CM

## 2018-08-31 DIAGNOSIS — R07.89 ATYPICAL CHEST PAIN: ICD-10-CM

## 2018-08-31 DIAGNOSIS — R55 SYNCOPE, UNSPECIFIED SYNCOPE TYPE: ICD-10-CM

## 2018-08-31 DIAGNOSIS — I50.23 ACUTE ON CHRONIC SYSTOLIC CONGESTIVE HEART FAILURE: ICD-10-CM

## 2018-08-31 DIAGNOSIS — I42.0 DILATED CARDIOMYOPATHY: Chronic | ICD-10-CM

## 2018-08-31 DIAGNOSIS — E83.42 HYPOMAGNESEMIA: ICD-10-CM

## 2018-08-31 DIAGNOSIS — R42 DIZZINESS: Primary | ICD-10-CM

## 2018-08-31 DIAGNOSIS — R06.02 SOB (SHORTNESS OF BREATH): ICD-10-CM

## 2018-08-31 DIAGNOSIS — I47.29 NSVT (NONSUSTAINED VENTRICULAR TACHYCARDIA): ICD-10-CM

## 2018-08-31 DIAGNOSIS — Z91.148 NON COMPLIANCE W MEDICATION REGIMEN: ICD-10-CM

## 2018-08-31 DIAGNOSIS — E87.6 HYPOKALEMIA: ICD-10-CM

## 2018-08-31 LAB
ALBUMIN SERPL BCP-MCNC: 4 G/DL
ALP SERPL-CCNC: 148 U/L
ALT SERPL W/O P-5'-P-CCNC: 10 U/L
AMPHET+METHAMPHET UR QL: NEGATIVE
ANION GAP SERPL CALC-SCNC: 8 MMOL/L
AST SERPL-CCNC: 21 U/L
BARBITURATES UR QL SCN>200 NG/ML: NEGATIVE
BASOPHILS # BLD AUTO: 0.03 K/UL
BASOPHILS NFR BLD: 0.6 %
BENZODIAZ UR QL SCN>200 NG/ML: NEGATIVE
BILIRUB SERPL-MCNC: 1.4 MG/DL
BNP SERPL-MCNC: 270 PG/ML
BUN SERPL-MCNC: 19 MG/DL
BZE UR QL SCN: NEGATIVE
CALCIUM SERPL-MCNC: 9.8 MG/DL
CANNABINOIDS UR QL SCN: NEGATIVE
CHLORIDE SERPL-SCNC: 102 MMOL/L
CO2 SERPL-SCNC: 30 MMOL/L
CREAT SERPL-MCNC: 1.4 MG/DL
CREAT UR-MCNC: 23 MG/DL
DIFFERENTIAL METHOD: ABNORMAL
EOSINOPHIL # BLD AUTO: 0.2 K/UL
EOSINOPHIL NFR BLD: 3.2 %
ERYTHROCYTE [DISTWIDTH] IN BLOOD BY AUTOMATED COUNT: 14.5 %
EST. GFR  (AFRICAN AMERICAN): >60 ML/MIN/1.73 M^2
EST. GFR  (NON AFRICAN AMERICAN): 57.8 ML/MIN/1.73 M^2
GLUCOSE SERPL-MCNC: 105 MG/DL
HCT VFR BLD AUTO: 36 %
HGB BLD-MCNC: 11.2 G/DL
IMM GRANULOCYTES # BLD AUTO: 0.01 K/UL
IMM GRANULOCYTES NFR BLD AUTO: 0.2 %
INR PPP: 2
LYMPHOCYTES # BLD AUTO: 1.1 K/UL
LYMPHOCYTES NFR BLD: 20.5 %
MAGNESIUM SERPL-MCNC: 1.5 MG/DL
MCH RBC QN AUTO: 28 PG
MCHC RBC AUTO-ENTMCNC: 31.1 G/DL
MCV RBC AUTO: 90 FL
METHADONE UR QL SCN>300 NG/ML: NEGATIVE
MONOCYTES # BLD AUTO: 1.1 K/UL
MONOCYTES NFR BLD: 20.3 %
NEUTROPHILS # BLD AUTO: 2.9 K/UL
NEUTROPHILS NFR BLD: 55.2 %
NRBC BLD-RTO: 0 /100 WBC
OPIATES UR QL SCN: NEGATIVE
PCP UR QL SCN>25 NG/ML: NEGATIVE
PHOSPHATE SERPL-MCNC: 2.9 MG/DL
PLATELET # BLD AUTO: 157 K/UL
PMV BLD AUTO: 10.6 FL
POTASSIUM SERPL-SCNC: 3.3 MMOL/L
PROT SERPL-MCNC: 8 G/DL
PROTHROMBIN TIME: 19.1 SEC
RBC # BLD AUTO: 4 M/UL
SODIUM SERPL-SCNC: 140 MMOL/L
TOXICOLOGY INFORMATION: NORMAL
TROPONIN I SERPL DL<=0.01 NG/ML-MCNC: 0.07 NG/ML
WBC # BLD AUTO: 5.32 K/UL

## 2018-08-31 PROCEDURE — 84100 ASSAY OF PHOSPHORUS: CPT

## 2018-08-31 PROCEDURE — 80053 COMPREHEN METABOLIC PANEL: CPT

## 2018-08-31 PROCEDURE — 85025 COMPLETE CBC W/AUTO DIFF WBC: CPT

## 2018-08-31 PROCEDURE — 84484 ASSAY OF TROPONIN QUANT: CPT

## 2018-08-31 PROCEDURE — 99285 EMERGENCY DEPT VISIT HI MDM: CPT | Mod: ,,, | Performed by: NURSE PRACTITIONER

## 2018-08-31 PROCEDURE — 80307 DRUG TEST PRSMV CHEM ANLYZR: CPT

## 2018-08-31 PROCEDURE — 85610 PROTHROMBIN TIME: CPT

## 2018-08-31 PROCEDURE — 99284 EMERGENCY DEPT VISIT MOD MDM: CPT | Mod: ,,, | Performed by: INTERNAL MEDICINE

## 2018-08-31 PROCEDURE — 96374 THER/PROPH/DIAG INJ IV PUSH: CPT

## 2018-08-31 PROCEDURE — 83735 ASSAY OF MAGNESIUM: CPT

## 2018-08-31 PROCEDURE — 99285 EMERGENCY DEPT VISIT HI MDM: CPT | Mod: 25

## 2018-08-31 PROCEDURE — 93005 ELECTROCARDIOGRAM TRACING: CPT

## 2018-08-31 PROCEDURE — 25000003 PHARM REV CODE 250: Performed by: NURSE PRACTITIONER

## 2018-08-31 PROCEDURE — 93010 ELECTROCARDIOGRAM REPORT: CPT | Mod: ,,, | Performed by: INTERNAL MEDICINE

## 2018-08-31 PROCEDURE — 83880 ASSAY OF NATRIURETIC PEPTIDE: CPT

## 2018-08-31 PROCEDURE — 63600175 PHARM REV CODE 636 W HCPCS: Performed by: NURSE PRACTITIONER

## 2018-08-31 RX ORDER — NITROGLYCERIN 0.4 MG/1
0.4 TABLET SUBLINGUAL EVERY 5 MIN PRN
Status: DISCONTINUED | OUTPATIENT
Start: 2018-08-31 | End: 2018-08-31 | Stop reason: HOSPADM

## 2018-08-31 RX ORDER — MAGNESIUM SULFATE HEPTAHYDRATE 40 MG/ML
2 INJECTION, SOLUTION INTRAVENOUS
Status: COMPLETED | OUTPATIENT
Start: 2018-08-31 | End: 2018-08-31

## 2018-08-31 RX ORDER — POTASSIUM CHLORIDE 20 MEQ/1
40 TABLET, EXTENDED RELEASE ORAL
Status: DISCONTINUED | OUTPATIENT
Start: 2018-08-31 | End: 2018-08-31 | Stop reason: HOSPADM

## 2018-08-31 RX ADMIN — NITROGLYCERIN 0.4 MG: 0.4 TABLET SUBLINGUAL at 01:08

## 2018-08-31 RX ADMIN — MAGNESIUM SULFATE IN WATER 2 G: 40 INJECTION, SOLUTION INTRAVENOUS at 03:08

## 2018-08-31 RX ADMIN — NITROGLYCERIN 0.4 MG: 0.4 TABLET SUBLINGUAL at 02:08

## 2018-08-31 NOTE — ED PROVIDER NOTES
Encounter Date: 8/31/2018       History     Chief Complaint   Patient presents with    Dizziness     Pt presented to the ED via [facility]. Pt c/o dizziness when changing positions.      Patient is a 52-year-old male with medical history of cardiomegaly, CHF, heart failure, CAD, hypertension, MI, PE presenting to the ED for possible syncopal episode.  Patient states since yesterday he has had some dizziness.  Patient states this afternoon his niece called the ambulance due to his syncopal episode.  Patient also endorses chest pain radiating to left side of his chest.  Patient denies any fever, chills, drug use, abdominal pain.            Review of patient's allergies indicates:   Allergen Reactions    Acetaminophen      Itching    Oxycodone-acetaminophen      Other reaction(s): Itching    Ace inhibitors Other (See Comments)     cough     Past Medical History:   Diagnosis Date    Anticoagulant long-term use     Cardiomegaly     CHF (congestive heart failure)     Chronic combined systolic and diastolic congestive heart failure     Coronary artery disease     CVA (cerebrovascular accident)     Heart attack 2006    Hypertension     Hyperthyroidism, subclinical 1/2/2013    MI (myocardial infarction) 9/22/2013    MI in 2009      Paroxysmal atrial fibrillation     PE (pulmonary embolism) 1/1/2013    IN 2010     S/P ablation of atrial flutter 2008    Stroke 2009    no residual weaknesses     Past Surgical History:   Procedure Laterality Date    RADIOFREQUENCY ABLATION  01/08/2008    for atrial flutter     Family History   Problem Relation Age of Onset    Hypertension Mother     Stroke Mother     Hypertension Father     Alcohol abuse Father     Hypertension Sister     Hypertension Brother      Social History     Tobacco Use    Smoking status: Never Smoker    Smokeless tobacco: Never Used   Substance Use Topics    Alcohol use: No    Drug use: No     Review of Systems   Constitutional:  Negative for activity change, chills and fever.   HENT: Negative for sore throat.    Respiratory: Positive for chest tightness and shortness of breath ( chronic). Negative for wheezing.    Cardiovascular: Positive for chest pain.   Gastrointestinal: Negative for abdominal pain, constipation, diarrhea, nausea and vomiting.   Genitourinary: Negative for decreased urine volume, difficulty urinating, dysuria and urgency.   Musculoskeletal: Negative for back pain.   Skin: Negative for pallor, rash and wound.   Neurological: Positive for dizziness. Negative for syncope, weakness, light-headedness, numbness and headaches.   Hematological: Does not bruise/bleed easily.       Physical Exam     Initial Vitals [08/31/18 1234]   BP Pulse Resp Temp SpO2   133/84 84 18 98.3 °F (36.8 °C) 98 %      MAP       --         Physical Exam    Nursing note and vitals reviewed.  Constitutional: Vital signs are normal. He appears well-developed and well-nourished. He is not diaphoretic. He is uncooperative. No distress.   HENT:   Head: Normocephalic and atraumatic.   Eyes: Pupils are equal, round, and reactive to light.   Neck: Normal range of motion. Neck supple.   Cardiovascular: Normal rate and normal heart sounds. An irregularly irregular rhythm present.    Pulses:       Radial pulses are 2+ on the right side, and 2+ on the left side.        Dorsalis pedis pulses are 2+ on the right side, and 2+ on the left side.   Pulmonary/Chest: Effort normal and breath sounds normal.   Abdominal: Soft. Normal appearance and bowel sounds are normal. He exhibits no distension. There is no tenderness. There is no rigidity, no rebound and no guarding.   Musculoskeletal: Normal range of motion.   Neurological: He is alert and oriented to person, place, and time. He has normal strength. No cranial nerve deficit or sensory deficit. GCS eye subscore is 4. GCS verbal subscore is 5. GCS motor subscore is 6.   Skin: Skin is warm, dry and intact. Capillary  refill takes less than 2 seconds. No abrasion, no laceration and no rash noted. No cyanosis. Nails show no clubbing.   Psychiatric: He has a normal mood and affect. His speech is normal and behavior is normal. Judgment and thought content normal. Cognition and memory are normal.         ED Course   Procedures  Labs Reviewed   CBC W/ AUTO DIFFERENTIAL - Abnormal; Notable for the following components:       Result Value    RBC 4.00 (*)     Hemoglobin 11.2 (*)     Hematocrit 36.0 (*)     MCHC 31.1 (*)     Mono # 1.1 (*)     Mono% 20.3 (*)     All other components within normal limits   COMPREHENSIVE METABOLIC PANEL - Abnormal; Notable for the following components:    Potassium 3.3 (*)     CO2 30 (*)     Total Bilirubin 1.4 (*)     Alkaline Phosphatase 148 (*)     eGFR if non  57.8 (*)     All other components within normal limits   TROPONIN I - Abnormal; Notable for the following components:    Troponin I 0.075 (*)     All other components within normal limits   B-TYPE NATRIURETIC PEPTIDE - Abnormal; Notable for the following components:     (*)     All other components within normal limits   PROTIME-INR - Abnormal; Notable for the following components:    Prothrombin Time 19.1 (*)     INR 2.0 (*)     All other components within normal limits   MAGNESIUM - Abnormal; Notable for the following components:    Magnesium 1.5 (*)     All other components within normal limits    Narrative:     green shared with order #[id number]   PHOSPHORUS    Narrative:     green shared with order #[id number]   DRUG SCREEN PANEL, URINE EMERGENCY    Narrative:     YELLOW & GRAY TUBES          Imaging Results          X-Ray Chest PA And Lateral (Final result)  Result time 08/31/18 15:37:54    Final result by Jeffrey Clark MD (08/31/18 15:37:54)                 Impression:      See above      Electronically signed by: Jeffrey Clark MD  Date:    08/31/2018  Time:    15:37             Narrative:    EXAMINATION:  XR CHEST  PA AND LATERAL    CLINICAL HISTORY:  Shortness of breath    TECHNIQUE:  PA and lateral views of the chest were performed.    COMPARISON:  No 01/13/2018 ne    FINDINGS:  Cardiomegaly pulmonary vascular congestion and bibasilar edema.  Airspace consolidation at the right lung base as before.  Slightly blunted costophrenic angles suggests small amount pleural effusion                                       APC / Resident Notes:   Emergent evaluation of a 51 yo male patient presenting to the ER with chief complaint of near syncopal episode this afternoon.  Patient states he has had periods of dizziness since yesterday.  On exam patient A&O x3.  EKG showing V-tach.  Cardiology consulted.  Will be down to see patient.  Patient endorses substernal chest pain radiating to his left arm.  Two doses of sublingual nitro given with no relief of symptoms.  Patient refusing 3rd dose.  Cap refill less than 2 sec.  Plus one pedal edema noted bilaterally.  Patient states this is baseline.  Orthostatics vital signs and negative. I will get labs, imaging, consult Cardiology and reassess.  Differential diagnoses include but are not limited to vasovagal syncope, arrhythmia, AMI, orthostatic (either dehydration or acute blood loss), seizure, stroke, AAA rupture, or Aortic dissection. I discussed the care of this patient with my Supervising Physician.      Patient's CBC unremarkable.  H&H stable at 11.2 36.  PT INR stable at 19.1 and 2.  Potassium low at 3.3 with a magnesium of 1.5.  Will replete with p.o. potassium and IV magnesium prior to discharge.  Troponin slightly elevated at 0.075 with a BNP of 270.  UA and UDS negative.  Cardiology consulted and at bedside.  Patient has been offered multiple options through Cardiology in the past.  Patient refusing all options.  As per Cardiology patient can be discharged home there is nothing from their standpoint for admission.  Patient reassessed.  Patient states feeling better.  Advised patient  of imaging and lab results. Patient is hemodynamically stable, vital signs are normal. Discharge instructions given.  Patient advised to follow up with PCP as needed. Return to ED precautions discussed. Follow up as directed. Pt verbalized understanding of this plan.  Pt is stable for discharge.                    Clinical Impression:   The primary encounter diagnosis was Dizziness. Diagnoses of Shortness of breath, SOB (shortness of breath), Syncope, unspecified syncope type, Hypokalemia, Hypomagnesemia, Atypical chest pain, Acute on chronic systolic congestive heart failure, Dilated cardiomyopathy, Non compliance w medication regimen, and NSVT (nonsustained ventricular tachycardia) were also pertinent to this visit.      Disposition:   Disposition: Discharged  Condition: Stable                        Yuki Villatoro NP  08/31/18 [id number]

## 2018-08-31 NOTE — ASSESSMENT & PLAN NOTE
FOREIGN based on history, provoking symptoms(stretching, palpation, deep breathing, sleeping on his side)  Troponin is always elevated in this patient due to cardiomyopathy, no EKG changes consistent with ischemia  Would stop checking troponins  PET in 2015 negative for ischemia

## 2018-08-31 NOTE — ED NOTES
The patient is asleep with spontaneous respirations. Pt arouses to voice.    No apparent distress noted. Airway is open and patent.  Respirations with normal effort and rate noted. Explanation of care provided to patient. No needs at this time. Will continue to monitor.

## 2018-08-31 NOTE — HPI
51 y/o man with PMHx of HFrEF (30-35%), non-obstructive PET 2015, HTN, CVA, and persistent aFib on coumadin, PE 2013 who presents to the ED for chest pain and shortness of breath. Chest pain started at 5pm yesterday and has not gotten better. Has had this pain before, worse with deep breathing, non-positional. While in the ED he had a 5 second run of vtach. Notes medication compliance, does not check blood pressure or weights at home.    He has been offered ICD several times in the past for his cardiomyopathy and a history of NSVT however adamantly refuses. Is aware that a sustained episode of VT would almost certainly be fatal. Continues to refuse ICD at this time.

## 2018-08-31 NOTE — SUBJECTIVE & OBJECTIVE
Past Medical History:   Diagnosis Date    Anticoagulant long-term use     Cardiomegaly     CHF (congestive heart failure)     Chronic combined systolic and diastolic congestive heart failure     Coronary artery disease     CVA (cerebrovascular accident)     Heart attack 2006    Hypertension     Hyperthyroidism, subclinical 1/2/2013    MI (myocardial infarction) 9/22/2013    MI in 2009      Paroxysmal atrial fibrillation     PE (pulmonary embolism) 1/1/2013    IN 2010     S/P ablation of atrial flutter 2008    Stroke 2009    no residual weaknesses       Past Surgical History:   Procedure Laterality Date    RADIOFREQUENCY ABLATION  01/08/2008    for atrial flutter       Review of patient's allergies indicates:   Allergen Reactions    Acetaminophen      Itching    Oxycodone-acetaminophen      Other reaction(s): Itching    Ace inhibitors Other (See Comments)     cough       No current facility-administered medications on file prior to encounter.      Current Outpatient Medications on File Prior to Encounter   Medication Sig    aspirin (ECOTRIN) 81 MG EC tablet Take 81 mg by mouth once daily.    carvedilol (COREG) 25 MG tablet Take 25 mg by mouth 2 (two) times daily.    cyclobenzaprine (FLEXERIL) 10 MG tablet Take 1 tablet (10 mg total) by mouth 3 (three) times daily as needed for Muscle spasms.    gabapentin (NEURONTIN) 400 MG capsule Take 1 capsule (400 mg total) by mouth 2 (two) times daily. For chronic heel/foot pain    isosorbide dinitrate (ISORDIL) 20 MG tablet Take 1 tablet (20 mg total) by mouth 3 (three) times daily.    lidocaine (LIDODERM) 5 % Place 1 patch onto the skin once daily. Remove & Discard patch within 12 hours or as directed by MD    losartan (COZAAR) 25 MG tablet Take 0.5 tablets (12.5 mg total) by mouth once daily.    metOLazone (ZAROXOLYN) 2.5 MG tablet Take 1 tablet (2.5 mg total) by mouth once daily.    nitroGLYCERIN (NITROSTAT) 0.4 MG SL tablet Place 1 tablet (0.4 mg  total) under the tongue every 5 (five) minutes as needed for Chest pain. Tablet, Sublingual Sublingual    oxyCODONE-acetaminophen (PERCOCET)  mg per tablet Take 1 tablet by mouth every 4 (four) hours as needed for Pain.    predniSONE (DELTASONE) 20 MG tablet Take 40mg for 2 days  Take 20mg for 2 days  Take 10mg for 2 days    torsemide (DEMADEX) 20 MG Tab Take 2 tablets (40 mg total) by mouth 2 (two) times daily.    traMADol (ULTRAM) 50 mg tablet Take 1-2 tablets ( mg total) by mouth every 6 (six) hours as needed for Pain.    warfarin (COUMADIN) 7.5 MG tablet Take 1.5 tablets (11.25 mg total) by mouth Daily. (Patient taking differently: Take 12 mg by mouth Daily. Total 12 mg daily)     Family History     Problem Relation (Age of Onset)    Alcohol abuse Father    Hypertension Mother, Father, Sister, Brother    Stroke Mother        Tobacco Use    Smoking status: Never Smoker    Smokeless tobacco: Never Used   Substance and Sexual Activity    Alcohol use: No    Drug use: No    Sexual activity: No     Review of Systems   Constitution: Negative for chills, diaphoresis, fever and weight loss.   HENT: Negative for congestion, hoarse voice and sore throat.    Eyes: Negative for blurred vision and double vision.   Cardiovascular: Positive for chest pain, dyspnea on exertion, leg swelling and palpitations. Negative for orthopnea, paroxysmal nocturnal dyspnea and syncope.   Respiratory: Positive for shortness of breath and sleep disturbances due to breathing.    Endocrine: Negative for cold intolerance and heat intolerance.   Hematologic/Lymphatic: Does not bruise/bleed easily.   Gastrointestinal: Negative for abdominal pain, constipation, diarrhea, nausea and vomiting.   Genitourinary: Negative for dysuria.   Neurological: Negative for focal weakness and sensory change.     Objective:     Vital Signs (Most Recent):  Temp: 98.3 °F (36.8 °C) (08/31/18 1234)  Pulse: 69 (08/31/18 1424)  Resp: 19 (08/31/18  1430)  BP: 135/79 (08/31/18 1424)  SpO2: 97 % (08/31/18 1426) Vital Signs (24h Range):  Temp:  [98.3 °F (36.8 °C)] 98.3 °F (36.8 °C)  Pulse:  [69-84] 69  Resp:  [18-19] 19  SpO2:  [96 %-98 %] 97 %  BP: (121-140)/(79-90) 135/79     Weight: 119.6 kg (263 lb 10.7 oz)  Body mass index is 38.94 kg/m².    SpO2: 97 %         Intake/Output Summary (Last 24 hours) at 8/31/2018 1502  Last data filed at 8/31/2018 1339  Gross per 24 hour   Intake --   Output 125 ml   Net -125 ml       Lines/Drains/Airways     Peripheral Intravenous Line                 Peripheral IV - Single Lumen 07/11/18 0215 Right Antecubital 51 days         Peripheral IV - Single Lumen 08/31/18 1332 Right Antecubital less than 1 day                Physical Exam   Constitutional: He is oriented to person, place, and time. He appears well-developed and well-nourished.   HENT:   Head: Normocephalic and atraumatic.   Eyes: EOM are normal. Pupils are equal, round, and reactive to light.   Neck: No JVD present.   Cardiovascular: Normal rate and regular rhythm. Exam reveals no gallop and no friction rub.   No murmur heard.  Pulmonary/Chest: Effort normal and breath sounds normal. No respiratory distress. He has no wheezes.   Abdominal: Soft. Bowel sounds are normal. He exhibits no distension.   Musculoskeletal: He exhibits no edema.   Neurological: He is alert and oriented to person, place, and time.   Skin: Skin is warm and dry.   Psychiatric: He has a normal mood and affect.       Significant Labs:   Recent Lab Results       08/31/18  1339 08/31/18  1333 08/31/18  1326      Benzodiazepines Negative       Methadone metabolites Negative       Phencyclidine Negative       Immature Granulocytes   0.2     Immature Grans (Abs)   0.01  Comment:  Mild elevation in immature granulocytes is non specific and   can be seen in a variety of conditions including stress response,   acute inflammation, trauma and pregnancy. Correlation with other   laboratory and clinical  findings is essential.       Albumin   4.0     Alkaline Phosphatase   148     ALT   10     Amphetamine Screen, Ur Negative       Anion Gap   8     AST   21     Barbiturate Screen, Ur Negative       Baso #   0.03     Basophil%   0.6     Total Bilirubin   1.4  Comment:  For infants and newborns, interpretation of results should be based  on gestational age, weight and in agreement with clinical  observations.  Premature Infant recommended reference ranges:  Up to 24 hours.............<8.0 mg/dL  Up to 48 hours............<12.0 mg/dL  3-5 days..................<15.0 mg/dL  6-29 days.................<15.0 mg/dL       BNP   270  Comment:  Values of less than 100 pg/ml are consistent with non-CHF populations.     BUN, Bld   19     Calcium   9.8     Chloride   102     CO2   30     Cocaine (Metab.) Negative       Creatinine   1.4     Creatinine, Random Ur 23.0  Comment:  The random urine reference ranges provided were established   for 24 hour urine collections.  No reference ranges exist for  random urine specimens.  Correlate clinically.         Differential Method   Automated     eGFR if    >60.0     eGFR if non    57.8  Comment:  Calculation used to obtain the estimated glomerular filtration  rate (eGFR) is the CKD-EPI equation.        Eos #   0.2     Eosinophil%   3.2     Glucose   105     Gran # (ANC)   2.9     Gran%   55.2     Hematocrit   36.0     Hemoglobin   11.2     Coumadin Monitoring INR   2.0  Comment:  Coumadin Therapy:  2.0 - 3.0 for INR for all indicators except mechanical heart valves  and antiphospholipid syndromes which should use 2.5 - 3.5.       Lymph #   1.1     Lymph%   20.5     Magnesium  1.5      MCH   28.0     MCHC   31.1     MCV   90     Mono #   1.1     Mono%   20.3     MPV   10.6     nRBC   0     Opiate Scrn, Ur Negative       Phosphorus  2.9      Platelets   157     Potassium   3.3     Total Protein   8.0     Protime   19.1     RBC   4.00     RDW   14.5      Sodium   140     Marijuana (THC) Metabolite Negative       Toxicology Information SEE COMMENT  Comment:  This screen includes the following classes of drugs at the   listed cut-off:  Benzodiazepines                  200 ng/ml  Methadone                        300 ng/ml  Cocaine metabolite               300 ng/ml  Opiates                          300 ng/ml  Barbiturates                     200 ng/ml  Amphetamines                    1000 ng/ml  Marijuana metabs (THC)            50 ng/ml  Phencyclidine (PCP)               25 ng/ml  High concentrations of Diphenhydramine may cross-react with  Phencyclidine PCP screening immunoassay giving a false   positive result.  High concentrations of Methylenedioxymethamphetamine (MDMA aka  Ectasy) and other structurally similar compounds may cross-   react with the Amphetamine/Methamphetamine screening   immunoassay giving a false positive result.  A metabolite of the anti-HIV drug Sustiva () may cause  false positive results in the Marijuana metabolite (THC)   screening assay.  Note: This exception list includes only more common   interferants in toxicology screen testing.  Because of many   cross-reactantspositive results on toxicology drug screens   should be confirmed whenever results do not correlate with   clinical presentation.  This report is intended for use in clinical monitoring and  management of patients. It is not intended for use in   employment related drug testing.  Because of any cross-reactants, positive results on toxicology  drug screens should be confirmed whenever results do not  correlate with clinical presentation.  Presumptive positive results are unconfirmed and may be used   only for medical purposes.         Troponin I   0.075  Comment:  The reference interval for Troponin I represents the 99th percentile   cutoff   for our facility and is consistent with 3rd generation assay   performance.       WBC   5.32           Significant Imaging:  Echocardiogram:   2D echo with color flow doppler:   Results for orders placed or performed during the hospital encounter of 10/25/17   2D echo with color flow doppler   Result Value Ref Range    EF 33 (A) 55 - 65    Mitral Valve Regurgitation TRIVIAL TO MILD     Est. PA Systolic Pressure 43.09 (A)     Pericardial Effusion TRIVIAL     Tricuspid Valve Regurgitation TRIVIAL TO MILD

## 2018-08-31 NOTE — ASSESSMENT & PLAN NOTE
Had 4.5 second run of MMVT in ER, had a flutter sensation but otherwise no symptoms  Electrolytes are significantly deranged and likely the cause in the setting of his cardiomyopathy  He continues to refuse any type of defibrillator which is absolutely indicated. This subject was discussed at length with him today, he is well aware of the potentially fatal complications of sustained ventricular arrhythmias and despite this, does not want an ICD under any circumstance.  Would replace potassium and magnesium  Can follow up with his primary cardiologist outpatient

## 2018-08-31 NOTE — CONSULTS
[facility]  Cardiology  Consult Note    Patient Name: Hamlet Terrell  MRN: [id number]  Admission Date: 8/31/2018  Hospital Length of Stay: 0 days  Code Status: Prior   Attending Provider: Jack Rosas DO   Consulting Provider: Guicho Luevano MD  Primary Care Physician: Carlin Swift MD  Principal Problem:<principal problem not specified>    Patient information was obtained from patient and ER records.     Inpatient consult to Cardiology  Consult performed by: Guicho Luevano MD  Consult ordered by: Yuki Villatoro NP        Subjective:     Chief Complaint:  Chest pain and shortness of breath     HPI:   51 y/o man with PMHx of HFrEF (30-35%), non-obstructive PET 2015, HTN, CVA, and persistent aFib on coumadin, PE 2013 who presents to the ED for chest pain and shortness of breath. Chest pain started at 5pm yesterday and has not gotten better. Has had this pain before, worse with deep breathing, non-positional. While in the ED he had a 5 second run of vtach. Notes medication compliance, does not check blood pressure or weights at home.    He has been offered ICD several times in the past for his cardiomyopathy and a history of NSVT however adamantly refuses. Is aware that a sustained episode of VT would almost certainly be fatal. Continues to refuse ICD at this time.    Past Medical History:   Diagnosis Date    Anticoagulant long-term use     Cardiomegaly     CHF (congestive heart failure)     Chronic combined systolic and diastolic congestive heart failure     Coronary artery disease     CVA (cerebrovascular accident)     Heart attack 2006    Hypertension     Hyperthyroidism, subclinical 1/2/2013    MI (myocardial infarction) 9/22/2013    MI in 2009      Paroxysmal atrial fibrillation     PE (pulmonary embolism) 1/1/2013    IN 2010     S/P ablation of atrial flutter 2008    Stroke 2009    no residual weaknesses       Past Surgical History:   Procedure Laterality Date     RADIOFREQUENCY ABLATION  01/08/2008    for atrial flutter       Review of patient's allergies indicates:   Allergen Reactions    Acetaminophen      Itching    Oxycodone-acetaminophen      Other reaction(s): Itching    Ace inhibitors Other (See Comments)     cough       No current facility-administered medications on file prior to encounter.      Current Outpatient Medications on File Prior to Encounter   Medication Sig    aspirin (ECOTRIN) 81 MG EC tablet Take 81 mg by mouth once daily.    carvedilol (COREG) 25 MG tablet Take 25 mg by mouth 2 (two) times daily.    cyclobenzaprine (FLEXERIL) 10 MG tablet Take 1 tablet (10 mg total) by mouth 3 (three) times daily as needed for Muscle spasms.    gabapentin (NEURONTIN) 400 MG capsule Take 1 capsule (400 mg total) by mouth 2 (two) times daily. For chronic heel/foot pain    isosorbide dinitrate (ISORDIL) 20 MG tablet Take 1 tablet (20 mg total) by mouth 3 (three) times daily.    lidocaine (LIDODERM) 5 % Place 1 patch onto the skin once daily. Remove & Discard patch within 12 hours or as directed by MD    losartan (COZAAR) 25 MG tablet Take 0.5 tablets (12.5 mg total) by mouth once daily.    metOLazone (ZAROXOLYN) 2.5 MG tablet Take 1 tablet (2.5 mg total) by mouth once daily.    nitroGLYCERIN (NITROSTAT) 0.4 MG SL tablet Place 1 tablet (0.4 mg total) under the tongue every 5 (five) minutes as needed for Chest pain. Tablet, Sublingual Sublingual    oxyCODONE-acetaminophen (PERCOCET)  mg per tablet Take 1 tablet by mouth every 4 (four) hours as needed for Pain.    predniSONE (DELTASONE) 20 MG tablet Take 40mg for 2 days  Take 20mg for 2 days  Take 10mg for 2 days    torsemide (DEMADEX) 20 MG Tab Take 2 tablets (40 mg total) by mouth 2 (two) times daily.    traMADol (ULTRAM) 50 mg tablet Take 1-2 tablets ( mg total) by mouth every 6 (six) hours as needed for Pain.    warfarin (COUMADIN) 7.5 MG tablet Take 1.5 tablets (11.25 mg total) by mouth  Daily. (Patient taking differently: Take 12 mg by mouth Daily. Total 12 mg daily)     Family History     Problem Relation (Age of Onset)    Alcohol abuse Father    Hypertension Mother, Father, Sister, Brother    Stroke Mother        Tobacco Use    Smoking status: Never Smoker    Smokeless tobacco: Never Used   Substance and Sexual Activity    Alcohol use: No    Drug use: No    Sexual activity: No     Review of Systems   Constitution: Negative for chills, diaphoresis, fever and weight loss.   HENT: Negative for congestion, hoarse voice and sore throat.    Eyes: Negative for blurred vision and double vision.   Cardiovascular: Positive for chest pain, dyspnea on exertion, leg swelling and palpitations. Negative for orthopnea, paroxysmal nocturnal dyspnea and syncope.   Respiratory: Positive for shortness of breath and sleep disturbances due to breathing.    Endocrine: Negative for cold intolerance and heat intolerance.   Hematologic/Lymphatic: Does not bruise/bleed easily.   Gastrointestinal: Negative for abdominal pain, constipation, diarrhea, nausea and vomiting.   Genitourinary: Negative for dysuria.   Neurological: Negative for focal weakness and sensory change.     Objective:     Vital Signs (Most Recent):  Temp: 98.3 °F (36.8 °C) (08/31/18 1234)  Pulse: 69 (08/31/18 1424)  Resp: 19 (08/31/18 1430)  BP: 135/79 (08/31/18 1424)  SpO2: 97 % (08/31/18 1426) Vital Signs (24h Range):  Temp:  [98.3 °F (36.8 °C)] 98.3 °F (36.8 °C)  Pulse:  [69-84] 69  Resp:  [18-19] 19  SpO2:  [96 %-98 %] 97 %  BP: (121-140)/(79-90) 135/79     Weight: 119.6 kg (263 lb 10.7 oz)  Body mass index is 38.94 kg/m².    SpO2: 97 %         Intake/Output Summary (Last 24 hours) at 8/31/2018 1502  Last data filed at 8/31/2018 1339  Gross per 24 hour   Intake --   Output 125 ml   Net -125 ml       Lines/Drains/Airways     Peripheral Intravenous Line                 Peripheral IV - Single Lumen 07/11/18 0215 Right Antecubital 51 days          Peripheral IV - Single Lumen 08/31/18 1332 Right Antecubital less than 1 day                Physical Exam   Constitutional: He is oriented to person, place, and time. He appears well-developed and well-nourished.   HENT:   Head: Normocephalic and atraumatic.   Eyes: EOM are normal. Pupils are equal, round, and reactive to light.   Neck: No JVD present.   Cardiovascular: Normal rate and regular rhythm. Exam reveals no gallop and no friction rub.   No murmur heard.  Pulmonary/Chest: Effort normal and breath sounds normal. No respiratory distress. He has no wheezes.   Abdominal: Soft. Bowel sounds are normal. He exhibits no distension.   Musculoskeletal: He exhibits no edema.   Neurological: He is alert and oriented to person, place, and time.   Skin: Skin is warm and dry.   Psychiatric: He has a normal mood and affect.       Significant Labs:   Recent Lab Results       08/31/18  1339 08/31/18  1333 08/31/18  1326      Benzodiazepines Negative       Methadone metabolites Negative       Phencyclidine Negative       Immature Granulocytes   0.2     Immature Grans (Abs)   0.01  Comment:  Mild elevation in immature granulocytes is non specific and   can be seen in a variety of conditions including stress response,   acute inflammation, trauma and pregnancy. Correlation with other   laboratory and clinical findings is essential.       Albumin   4.0     Alkaline Phosphatase   148     ALT   10     Amphetamine Screen, Ur Negative       Anion Gap   8     AST   21     Barbiturate Screen, Ur Negative       Baso #   0.03     Basophil%   0.6     Total Bilirubin   1.4  Comment:  For infants and newborns, interpretation of results should be based  on gestational age, weight and in agreement with clinical  observations.  Premature Infant recommended reference ranges:  Up to 24 hours.............<8.0 mg/dL  Up to 48 hours............<12.0 mg/dL  3-5 days..................<15.0 mg/dL  6-29 days.................<15.0 mg/dL       BNP    270  Comment:  Values of less than 100 pg/ml are consistent with non-CHF populations.     BUN, Bld   19     Calcium   9.8     Chloride   102     CO2   30     Cocaine (Metab.) Negative       Creatinine   1.4     Creatinine, Random Ur 23.0  Comment:  The random urine reference ranges provided were established   for 24 hour urine collections.  No reference ranges exist for  random urine specimens.  Correlate clinically.         Differential Method   Automated     eGFR if    >60.0     eGFR if non    57.8  Comment:  Calculation used to obtain the estimated glomerular filtration  rate (eGFR) is the CKD-EPI equation.        Eos #   0.2     Eosinophil%   3.2     Glucose   105     Gran # (ANC)   2.9     Gran%   55.2     Hematocrit   36.0     Hemoglobin   11.2     Coumadin Monitoring INR   2.0  Comment:  Coumadin Therapy:  2.0 - 3.0 for INR for all indicators except mechanical heart valves  and antiphospholipid syndromes which should use 2.5 - 3.5.       Lymph #   1.1     Lymph%   20.5     Magnesium  1.5      MCH   28.0     MCHC   31.1     MCV   90     Mono #   1.1     Mono%   20.3     MPV   10.6     nRBC   0     Opiate Scrn, Ur Negative       Phosphorus  2.9      Platelets   157     Potassium   3.3     Total Protein   8.0     Protime   19.1     RBC   4.00     RDW   14.5     Sodium   140     Marijuana (THC) Metabolite Negative       Toxicology Information SEE COMMENT  Comment:  This screen includes the following classes of drugs at the   listed cut-off:  Benzodiazepines                  200 ng/ml  Methadone                        300 ng/ml  Cocaine metabolite               300 ng/ml  Opiates                          300 ng/ml  Barbiturates                     200 ng/ml  Amphetamines                    1000 ng/ml  Marijuana metabs (THC)            50 ng/ml  Phencyclidine (PCP)               25 ng/ml  High concentrations of Diphenhydramine may cross-react with  Phencyclidine PCP screening  immunoassay giving a false   positive result.  High concentrations of Methylenedioxymethamphetamine (MDMA aka  Ectasy) and other structurally similar compounds may cross-   react with the Amphetamine/Methamphetamine screening   immunoassay giving a false positive result.  A metabolite of the anti-HIV drug Sustiva () may cause  false positive results in the Marijuana metabolite (THC)   screening assay.  Note: This exception list includes only more common   interferants in toxicology screen testing.  Because of many   cross-reactantspositive results on toxicology drug screens   should be confirmed whenever results do not correlate with   clinical presentation.  This report is intended for use in clinical monitoring and  management of patients. It is not intended for use in   employment related drug testing.  Because of any cross-reactants, positive results on toxicology  drug screens should be confirmed whenever results do not  correlate with clinical presentation.  Presumptive positive results are unconfirmed and may be used   only for medical purposes.         Troponin I   0.075  Comment:  The reference interval for Troponin I represents the 99th percentile   cutoff   for our facility and is consistent with 3rd generation assay   performance.       WBC   5.32           Significant Imaging: Echocardiogram:   2D echo with color flow doppler:   Results for orders placed or performed during the hospital encounter of 10/25/17   2D echo with color flow doppler   Result Value Ref Range    EF 33 (A) 55 - 65    Mitral Valve Regurgitation TRIVIAL TO MILD     Est. PA Systolic Pressure 43.09 (A)     Pericardial Effusion TRIVIAL     Tricuspid Valve Regurgitation TRIVIAL TO MILD      Assessment and Plan:     Chest pain    MSK based on history, provoking symptoms(stretching, palpation, deep breathing, sleeping on his side)  Troponin is always elevated in this patient due to cardiomyopathy, no EKG changes consistent with  ischemia  Would stop checking troponins  PET in 2015 negative for ischemia          NSVT (nonsustained ventricular tachycardia)    Had 4.5 second run of MMVT in ER, had a flutter sensation but otherwise no symptoms  Electrolytes are significantly deranged and likely the cause in the setting of his cardiomyopathy  He continues to refuse any type of defibrillator which is absolutely indicated. This subject was discussed at length with him today, he is well aware of the potentially fatal complications of sustained ventricular arrhythmias and despite this, does not want an ICD under any circumstance.  Would replace potassium and magnesium  Can follow up with his primary cardiologist outpatient        Cardiomyopathy    Last EF depressed  BNP minimally elevated and lower extremity changes are likely chronic  Continue GDMT and home diuretics            VTE Risk Mitigation (From admission, onward)    None          Thank you for your consult.    Guicho Luevano,   Cardiology Fellow    Cardiology   [facility]  I have personally taken the history and examined the patient and agree with the resident's note as stated above.  Unless troponin is a lot higher than past ( it was high in past with CHF), he may be able to go home later this pm or at least am after one or 2 doses of diuretic.Candidate for ICD but declined.

## 2018-08-31 NOTE — ED NOTES
Pt had run of VTACH on EKG  Monitor with a report of CP and palpitations.  Dr. Rosas and Yuki Villatoro, DAVID made aware and physicians now at bedside assessing patient.

## 2018-08-31 NOTE — ED TRIAGE NOTES
Hamlet Terrell, a 52 y.o. male presents to the ED w/ complaint of c/o SOB and fatigue since last night.  Pt has hx of a fib and has been SOB since last night. Pt reports taking blood thinners and lasix as prescribed.      Triage note:  Chief Complaint   Patient presents with    Dizziness     Pt presented to the ED via [facility]. Pt c/o dizziness when changing positions.      Review of patient's allergies indicates:   Allergen Reactions    Acetaminophen      Itching    Oxycodone-acetaminophen      Other reaction(s): Itching    Ace inhibitors Other (See Comments)     cough     Past Medical History:   Diagnosis Date    Anticoagulant long-term use     Cardiomegaly     CHF (congestive heart failure)     Chronic combined systolic and diastolic congestive heart failure     Coronary artery disease     CVA (cerebrovascular accident)     Heart attack 2006    Hypertension     Hyperthyroidism, subclinical 1/2/2013    MI (myocardial infarction) 9/22/2013    MI in 2009      Paroxysmal atrial fibrillation     PE (pulmonary embolism) 1/1/2013    IN 2010     S/P ablation of atrial flutter 2008    Stroke 2009    no residual weaknesses     LOC: Patient name and date of birth verified. The patient is awake, alert and aware of environment with an appropriate affect, the patient is oriented x 3 and speaking appropriately.   APPEARANCE: Patient resting comfortably, patient is clean and well groomed, patient's clothing is properly fastened.  RESPIRATORY: Respirations are spontaneous, patient has a normal effort and rate, no accessory muscle use noted. Pt reports SOB at this time but does not report wearing oxygen at home.   CARDIAC: Patient has a normal rate and rhythm, no periphreal edema noted, capillary refill < 3 seconds. Pt denies CP or palpitations.   NEUROLOGIC: Eyes open spontaneously, behavior appropriate to situation, follows commands, facial expression symmetrical, bilateral hand grasp equal and even,  purposeful motor response noted, normal sensation in all extremities when touched with a finger.

## 2018-08-31 NOTE — ASSESSMENT & PLAN NOTE
Last EF depressed  BNP minimally elevated and lower extremity changes are likely chronic  Continue GDMT and home diuretics

## 2018-09-05 ENCOUNTER — HOSPITAL ENCOUNTER (EMERGENCY)
Facility: HOSPITAL | Age: 53
Discharge: HOME | End: 2018-09-06
Attending: EMERGENCY MEDICINE
Payer: MEDICAID

## 2018-09-05 DIAGNOSIS — R06.00 DYSPNEA: ICD-10-CM

## 2018-09-05 DIAGNOSIS — R00.2 PALPITATIONS: ICD-10-CM

## 2018-09-05 DIAGNOSIS — R05.9 COUGH: Primary | ICD-10-CM

## 2018-09-05 LAB
APTT BLDCRRT: 22.5 SEC
BASOPHILS # BLD AUTO: 0.07 K/UL
BASOPHILS NFR BLD: 1 %
DIFFERENTIAL METHOD: ABNORMAL
EOSINOPHIL # BLD AUTO: 0.3 K/UL
EOSINOPHIL NFR BLD: 3.7 %
ERYTHROCYTE [DISTWIDTH] IN BLOOD BY AUTOMATED COUNT: 14.7 %
HCT VFR BLD AUTO: 33.9 %
HGB BLD-MCNC: 10.6 G/DL
IMM GRANULOCYTES # BLD AUTO: 0.02 K/UL
IMM GRANULOCYTES NFR BLD AUTO: 0.3 %
INR PPP: 2.3
LYMPHOCYTES # BLD AUTO: 1.1 K/UL
LYMPHOCYTES NFR BLD: 15.9 %
MCH RBC QN AUTO: 28.1 PG
MCHC RBC AUTO-ENTMCNC: 31.3 G/DL
MCV RBC AUTO: 90 FL
MONOCYTES # BLD AUTO: 1.3 K/UL
MONOCYTES NFR BLD: 18.7 %
NEUTROPHILS # BLD AUTO: 4.1 K/UL
NEUTROPHILS NFR BLD: 60.4 %
NRBC BLD-RTO: 0 /100 WBC
PLATELET # BLD AUTO: 174 K/UL
PMV BLD AUTO: 11.4 FL
PROTHROMBIN TIME: 21.9 SEC
RBC # BLD AUTO: 3.77 M/UL
WBC # BLD AUTO: 6.73 K/UL

## 2018-09-05 PROCEDURE — 85610 PROTHROMBIN TIME: CPT

## 2018-09-05 PROCEDURE — 93005 ELECTROCARDIOGRAM TRACING: CPT

## 2018-09-05 PROCEDURE — 99285 EMERGENCY DEPT VISIT HI MDM: CPT | Mod: 25

## 2018-09-05 PROCEDURE — 96375 TX/PRO/DX INJ NEW DRUG ADDON: CPT

## 2018-09-05 PROCEDURE — 84484 ASSAY OF TROPONIN QUANT: CPT

## 2018-09-05 PROCEDURE — 96361 HYDRATE IV INFUSION ADD-ON: CPT

## 2018-09-05 PROCEDURE — 85730 THROMBOPLASTIN TIME PARTIAL: CPT

## 2018-09-05 PROCEDURE — 99285 EMERGENCY DEPT VISIT HI MDM: CPT | Mod: ,,, | Performed by: EMERGENCY MEDICINE

## 2018-09-05 PROCEDURE — 85025 COMPLETE CBC W/AUTO DIFF WBC: CPT

## 2018-09-05 PROCEDURE — 82962 GLUCOSE BLOOD TEST: CPT

## 2018-09-05 PROCEDURE — 96374 THER/PROPH/DIAG INJ IV PUSH: CPT

## 2018-09-05 RX ORDER — PANTOPRAZOLE SODIUM 40 MG/10ML
40 INJECTION, POWDER, LYOPHILIZED, FOR SOLUTION INTRAVENOUS
Status: COMPLETED | OUTPATIENT
Start: 2018-09-05 | End: 2018-09-06

## 2018-09-05 RX ORDER — ONDANSETRON 2 MG/ML
8 INJECTION INTRAMUSCULAR; INTRAVENOUS
Status: COMPLETED | OUTPATIENT
Start: 2018-09-05 | End: 2018-09-06

## 2018-09-06 VITALS
TEMPERATURE: 99 F | DIASTOLIC BLOOD PRESSURE: 78 MMHG | OXYGEN SATURATION: 88 % | HEART RATE: 144 BPM | SYSTOLIC BLOOD PRESSURE: 148 MMHG | RESPIRATION RATE: 23 BRPM

## 2018-09-06 LAB
LACTATE SERPL-SCNC: 1.3 MMOL/L
POCT GLUCOSE: 85 MG/DL (ref 70–110)
TROPONIN I SERPL DL<=0.01 NG/ML-MCNC: 0.09 NG/ML

## 2018-09-06 PROCEDURE — 63600175 PHARM REV CODE 636 W HCPCS: Performed by: STUDENT IN AN ORGANIZED HEALTH CARE EDUCATION/TRAINING PROGRAM

## 2018-09-06 PROCEDURE — 83605 ASSAY OF LACTIC ACID: CPT

## 2018-09-06 PROCEDURE — C9113 INJ PANTOPRAZOLE SODIUM, VIA: HCPCS | Performed by: STUDENT IN AN ORGANIZED HEALTH CARE EDUCATION/TRAINING PROGRAM

## 2018-09-06 PROCEDURE — 93010 ELECTROCARDIOGRAM REPORT: CPT | Mod: ,,, | Performed by: INTERNAL MEDICINE

## 2018-09-06 PROCEDURE — 25000003 PHARM REV CODE 250: Performed by: STUDENT IN AN ORGANIZED HEALTH CARE EDUCATION/TRAINING PROGRAM

## 2018-09-06 RX ORDER — BENZONATATE 100 MG/1
100 CAPSULE ORAL 3 TIMES DAILY PRN
Status: DISCONTINUED | OUTPATIENT
Start: 2018-09-06 | End: 2018-09-06 | Stop reason: HOSPADM

## 2018-09-06 RX ORDER — AZITHROMYCIN 250 MG/1
500 TABLET, FILM COATED ORAL
Status: COMPLETED | OUTPATIENT
Start: 2018-09-06 | End: 2018-09-06

## 2018-09-06 RX ORDER — MORPHINE SULFATE 2 MG/ML
2 INJECTION, SOLUTION INTRAMUSCULAR; INTRAVENOUS
Status: DISCONTINUED | OUTPATIENT
Start: 2018-09-06 | End: 2018-09-06 | Stop reason: HOSPADM

## 2018-09-06 RX ORDER — AZITHROMYCIN 250 MG/1
250 TABLET, FILM COATED ORAL DAILY
Qty: 4 TABLET | Refills: 0 | Status: SHIPPED | OUTPATIENT
Start: 2018-09-06 | End: 2019-01-31

## 2018-09-06 RX ORDER — CEPHALEXIN 500 MG/1
500 CAPSULE ORAL
Status: COMPLETED | OUTPATIENT
Start: 2018-09-06 | End: 2018-09-06

## 2018-09-06 RX ORDER — GUAIFENESIN/DEXTROMETHORPHAN 100-10MG/5
5 SYRUP ORAL 4 TIMES DAILY PRN
Qty: 120 ML | Refills: 0 | Status: SHIPPED | OUTPATIENT
Start: 2018-09-06 | End: 2018-09-16

## 2018-09-06 RX ORDER — CEPHALEXIN 500 MG/1
500 CAPSULE ORAL EVERY 12 HOURS
Qty: 14 CAPSULE | Refills: 0 | Status: SHIPPED | OUTPATIENT
Start: 2018-09-06 | End: 2018-09-13

## 2018-09-06 RX ADMIN — PANTOPRAZOLE SODIUM 40 MG: 40 INJECTION, POWDER, FOR SOLUTION INTRAVENOUS at 12:09

## 2018-09-06 RX ADMIN — CEPHALEXIN 500 MG: 500 CAPSULE ORAL at 03:09

## 2018-09-06 RX ADMIN — SODIUM CHLORIDE 500 ML: 0.9 INJECTION, SOLUTION INTRAVENOUS at 12:09

## 2018-09-06 RX ADMIN — BENZONATATE 100 MG: 100 CAPSULE ORAL at 02:09

## 2018-09-06 RX ADMIN — ONDANSETRON HYDROCHLORIDE 8 MG: 2 INJECTION, SOLUTION INTRAMUSCULAR; INTRAVENOUS at 12:09

## 2018-09-06 RX ADMIN — GUAIFENESIN AND DEXTROMETHORPHAN HYDROBROMIDE 1 TABLET: 600; 30 TABLET, EXTENDED RELEASE ORAL at 02:09

## 2018-09-06 RX ADMIN — AZITHROMYCIN MONOHYDRATE 500 MG: 250 TABLET ORAL at 03:09

## 2018-09-06 RX ADMIN — SODIUM CHLORIDE 500 ML: 0.9 INJECTION, SOLUTION INTRAVENOUS at 04:09

## 2018-09-06 NOTE — ED PROVIDER NOTES
Encounter Date: 9/5/2018       History     Chief Complaint   Patient presents with    Dehydration     Pt presents to ED c/o feeling dehydrated. States he feels weak and hot and endorses n/v x 2 days. Pt states he feels like he is going to faint.     HPI     51 yo man with PMH of persistent afib s/p ablation anticoagulated on coumadin, CHF w EF 30-35%, CAD, HTN, CVA, PE 2013 who presents with 2 days of cough, nausea and vomiting. Pt reports that he had hemoptysis that started as streaks 2 days ago and worsened to small amounts of bright red blood. Reports associated chest pain with cough. Emesis x8, denies pain with eating, movement or BM. No diarrhea, constipation, dysuria, hematuria. Reports that he has been unable to keep fluids down over the past 2 days. Reports that he had episode of palpitations prior to arrival that resolved. Pt denies hx of GIB, esophageal varices, alcohol use, PUD.     Review of patient's allergies indicates:   Allergen Reactions    Acetaminophen      Itching    Oxycodone-acetaminophen      Other reaction(s): Itching    Ace inhibitors Other (See Comments)     cough     Past Medical History:   Diagnosis Date    Anticoagulant long-term use     Cardiomegaly     CHF (congestive heart failure)     Chronic combined systolic and diastolic congestive heart failure     Coronary artery disease     CVA (cerebrovascular accident)     Heart attack 2006    Hypertension     Hyperthyroidism, subclinical 1/2/2013    MI (myocardial infarction) 9/22/2013    MI in 2009      Paroxysmal atrial fibrillation     PE (pulmonary embolism) 1/1/2013    IN 2010     S/P ablation of atrial flutter 2008    Stroke 2009    no residual weaknesses     Past Surgical History:   Procedure Laterality Date    RADIOFREQUENCY ABLATION  01/08/2008    for atrial flutter     Family History   Problem Relation Age of Onset    Hypertension Mother     Stroke Mother     Hypertension Father     Alcohol abuse Father      Hypertension Sister     Hypertension Brother      Social History     Tobacco Use    Smoking status: Never Smoker    Smokeless tobacco: Never Used   Substance Use Topics    Alcohol use: No    Drug use: No     Review of Systems   Constitutional: Negative for chills and fever.   HENT: Negative for sore throat.    Eyes: Negative for visual disturbance.   Respiratory: Positive for cough and shortness of breath.    Cardiovascular: Positive for chest pain. Negative for leg swelling.   Gastrointestinal: Positive for nausea and vomiting. Negative for abdominal pain, blood in stool, constipation and diarrhea.   Genitourinary: Negative for dysuria.   Musculoskeletal: Negative for back pain.   Skin: Negative for rash.   Neurological: Negative for dizziness, syncope, light-headedness and headaches.       Physical Exam     Initial Vitals [09/05/18 2230]   BP Pulse Resp Temp SpO2   (!) 128/92 79 (!) 24 98 °F (36.7 °C) 95 %      MAP       --         Physical Exam    Constitutional: Vital signs are normal. He appears well-developed and well-nourished.   HENT:   Head: Normocephalic and atraumatic.   Eyes: Pupils are equal, round, and reactive to light.   Neck: Normal range of motion. Neck supple. No tracheal deviation present.   Cardiovascular: Normal rate, normal heart sounds and intact distal pulses.   Irregularly irregular   Pulmonary/Chest: Breath sounds normal.   Reproducible chest wall pain   Abdominal: Soft. Bowel sounds are normal. He exhibits no mass. There is no tenderness. There is no rigidity, no rebound, no guarding and no CVA tenderness.   Musculoskeletal: Normal range of motion.   Chronic leg swelling   Neurological: He is alert and oriented to person, place, and time. He has normal strength. No sensory deficit.   Skin: Skin is warm and dry.         ED Course   Procedures  Labs Reviewed - No data to display     HO-II MDM:    51 yo man with PMH of persistent afib s/p ablation anticoagulated on coumadin, CHF w EF  30-35%, CAD, HTN, CVA, PE 2013 who presents with 2 days of cough, nausea and vomiting. DDx facundo collado tear, afib with RVR, dehydration, viral illness. Of note, pt has been offered ICD multiple times but refuses, has hx of Vtach. Hemoptysis sounds traumatic from frequent coughing, chest pain also associated with cough. Reproducible chest wall pain. Suspect that pt has viral illness, dehydration may have cause him to go into RVR. Currently rate is controlled. Will order small bolus of NS since pt has slight depression in EF. Pending cardiac labs, CXR, EKG. Protonix, zofran, ordered.    Indiana Quach MD MPH  PGY2 [employer] [facility]  9/5/2018 11:09 PM    HO-II update:    WBC 6.7, lactic 1.3, INR 2.3, trop 0.091 consistent with previous, CXR with increased parenchymal attenuation in bases that is similar to CXR on 8/31. Pt with productive cough, possible there is infectious etiology underlying such as pertussis or CAP.Initially pt declined pacemaker but had change of heart after discussion on the importance of the treatment. Spoke to cardiology who reviewed his chart, they will see him as an outpatient within the week. Treated cough with tessalon, guaifenesin without relief, giving low dose of morphine to suppress cough. Will also treat with cephalexin and azithro for potential infectious causes. Discharge to home with cardiology f/u.     Indiana Quach MD MPH  PGY2 [employer] [facility]  9/6/2018 4:19 AM              Imaging Results    None           EKG with afib, rate 77. qrs elongated 134. qtc 459                    Clinical Impression:   Diagnoses of Palpitations and Dyspnea were pertinent to this visit.                             Indiana Quach MD  Resident  09/06/18 0424       Indiana Quach MD  Resident  09/06/18 3412

## 2018-09-06 NOTE — DISCHARGE INSTRUCTIONS
Please return to the emergency department if you develop worsening chest pain, shortness of breath, cough

## 2018-09-06 NOTE — ED TRIAGE NOTES
Pt c/o N/V/D for 2.5 days, reports bright red blood in vomit. States vomited about 8x's, also reports cough and chest pain with cough that began 2.5 days ago. Hx of a-fib and reports palpitations on the way to hospital    Patient Identifiers for Hamlet Terrell checked and correct  LOC: The patient is awake, alert and aware of environment with an appropriate affect, the patient is oriented x 3 and speaking appropriate.  APPEARANCE: Patient resting comfortably and in no acute distress, patient is clean and well groomed, patient's clothing is properly fastened.  SKIN: The skin is warm and dry, patient has normal skin turgor and moist mucus membranes,no rashes or lesions.Skin Intact , No Breakdown Noted  Musculoskeletal :  Normal range of motion noted. Moves all extremeties well, No swelling or tenderness noted  RESPIRATORY: Airway is open and patent, respirations are spontaneous, patient has a normal effort and rate.  CARDIAC: Patient has a normal rate and rhythm, no periphreal edema noted, capillary refill < 3 seconds.   ABDOMEN: Soft and non tender to palpation, no distention noted.   PULSES: 2+  And symmetrical in all extremeties  NEUROLOGIC: PERRL. facial expression is symmetrical, patient moving all extremities, normal sensation in all extremities when touched with a finger.The patient is awake, alert and cooperative with a calm affect, patient is aware of environment.    Will continue to monitor

## 2018-09-06 NOTE — ED NOTES
Pt states he wants to wait until after his x-ray to take medications, will hold until patient returns

## 2018-09-06 NOTE — ED NOTES
Patient on initial assessment appears apathetic and lacks the willingness to learned or inform RN on his present illness.  Patient asked general questions regarding symptoms and patient did not respond with an answer.  Patient continues to ignore RN during focal assessment and demonstrate antisocial behavior.

## 2018-09-12 LAB
ACID FAST MOD KINY STN SPEC: NORMAL
MYCOBACTERIUM SPEC QL CULT: NORMAL

## 2018-11-04 ENCOUNTER — HOSPITAL ENCOUNTER (EMERGENCY)
Facility: HOSPITAL | Age: 53
Discharge: HOME | End: 2018-11-05
Attending: EMERGENCY MEDICINE
Payer: MEDICAID

## 2018-11-04 VITALS
TEMPERATURE: 98 F | SYSTOLIC BLOOD PRESSURE: 136 MMHG | DIASTOLIC BLOOD PRESSURE: 95 MMHG | BODY MASS INDEX: 40.2 KG/M2 | RESPIRATION RATE: 20 BRPM | WEIGHT: 271.38 LBS | OXYGEN SATURATION: 96 % | HEART RATE: 107 BPM | HEIGHT: 69 IN

## 2018-11-04 DIAGNOSIS — R05.9 COUGH: Primary | ICD-10-CM

## 2018-11-04 DIAGNOSIS — N28.9 RENAL INSUFFICIENCY: ICD-10-CM

## 2018-11-04 DIAGNOSIS — R06.02 SHORTNESS OF BREATH: ICD-10-CM

## 2018-11-04 DIAGNOSIS — R51.9 ACUTE NONINTRACTABLE HEADACHE, UNSPECIFIED HEADACHE TYPE: ICD-10-CM

## 2018-11-04 LAB
ALBUMIN SERPL BCP-MCNC: 3.8 G/DL
ALP SERPL-CCNC: 176 U/L
ALT SERPL W/O P-5'-P-CCNC: 11 U/L
ANION GAP SERPL CALC-SCNC: 15 MMOL/L
AST SERPL-CCNC: 27 U/L
BASOPHILS # BLD AUTO: 0.05 K/UL
BASOPHILS NFR BLD: 0.8 %
BILIRUB SERPL-MCNC: 2.6 MG/DL
BNP SERPL-MCNC: 275 PG/ML
BUN SERPL-MCNC: 22 MG/DL
CALCIUM SERPL-MCNC: 9.8 MG/DL
CHLORIDE SERPL-SCNC: 103 MMOL/L
CO2 SERPL-SCNC: 22 MMOL/L
CREAT SERPL-MCNC: 1.6 MG/DL
DIFFERENTIAL METHOD: ABNORMAL
EOSINOPHIL # BLD AUTO: 0.3 K/UL
EOSINOPHIL NFR BLD: 4.4 %
ERYTHROCYTE [DISTWIDTH] IN BLOOD BY AUTOMATED COUNT: 14.6 %
EST. GFR  (AFRICAN AMERICAN): 56.4 ML/MIN/1.73 M^2
EST. GFR  (NON AFRICAN AMERICAN): 48.8 ML/MIN/1.73 M^2
GLUCOSE SERPL-MCNC: 104 MG/DL
HCT VFR BLD AUTO: 31.8 %
HGB BLD-MCNC: 10.2 G/DL
IMM GRANULOCYTES # BLD AUTO: 0.01 K/UL
IMM GRANULOCYTES NFR BLD AUTO: 0.2 %
INR PPP: 2.5
LYMPHOCYTES # BLD AUTO: 1 K/UL
LYMPHOCYTES NFR BLD: 16.4 %
MCH RBC QN AUTO: 28.3 PG
MCHC RBC AUTO-ENTMCNC: 32.1 G/DL
MCV RBC AUTO: 88 FL
MONOCYTES # BLD AUTO: 1.3 K/UL
MONOCYTES NFR BLD: 21.4 %
NEUTROPHILS # BLD AUTO: 3.5 K/UL
NEUTROPHILS NFR BLD: 56.8 %
NRBC BLD-RTO: 0 /100 WBC
PLATELET # BLD AUTO: 195 K/UL
PMV BLD AUTO: 10.5 FL
POTASSIUM SERPL-SCNC: 3.8 MMOL/L
PROT SERPL-MCNC: 8.6 G/DL
PROTHROMBIN TIME: 24.7 SEC
RBC # BLD AUTO: 3.61 M/UL
SODIUM SERPL-SCNC: 140 MMOL/L
TROPONIN I SERPL DL<=0.01 NG/ML-MCNC: 0.09 NG/ML
WBC # BLD AUTO: 6.11 K/UL

## 2018-11-04 PROCEDURE — 84484 ASSAY OF TROPONIN QUANT: CPT

## 2018-11-04 PROCEDURE — 25000003 PHARM REV CODE 250

## 2018-11-04 PROCEDURE — 93005 ELECTROCARDIOGRAM TRACING: CPT

## 2018-11-04 PROCEDURE — 93010 ELECTROCARDIOGRAM REPORT: CPT | Mod: ,,, | Performed by: INTERNAL MEDICINE

## 2018-11-04 PROCEDURE — 80053 COMPREHEN METABOLIC PANEL: CPT

## 2018-11-04 PROCEDURE — 85610 PROTHROMBIN TIME: CPT

## 2018-11-04 PROCEDURE — 99285 EMERGENCY DEPT VISIT HI MDM: CPT | Mod: 25

## 2018-11-04 PROCEDURE — 85025 COMPLETE CBC W/AUTO DIFF WBC: CPT

## 2018-11-04 PROCEDURE — 99284 EMERGENCY DEPT VISIT MOD MDM: CPT | Mod: ,,, | Performed by: EMERGENCY MEDICINE

## 2018-11-04 PROCEDURE — 83880 ASSAY OF NATRIURETIC PEPTIDE: CPT

## 2018-11-04 RX ORDER — DIPHENHYDRAMINE HCL 25 MG
12.5 CAPSULE ORAL
Status: DISCONTINUED | OUTPATIENT
Start: 2018-11-04 | End: 2018-11-04

## 2018-11-04 RX ORDER — ACETAMINOPHEN 325 MG/1
650 TABLET ORAL
Status: DISCONTINUED | OUTPATIENT
Start: 2018-11-04 | End: 2018-11-04

## 2018-11-04 RX ORDER — TRAMADOL HYDROCHLORIDE 50 MG/1
TABLET ORAL
Status: COMPLETED
Start: 2018-11-04 | End: 2018-11-04

## 2018-11-04 RX ORDER — TRAMADOL HYDROCHLORIDE 50 MG/1
50 TABLET ORAL
Status: DISCONTINUED | OUTPATIENT
Start: 2018-11-04 | End: 2018-11-04

## 2018-11-04 RX ORDER — TRAMADOL HYDROCHLORIDE 50 MG/1
50 TABLET ORAL
Status: COMPLETED | OUTPATIENT
Start: 2018-11-04 | End: 2018-11-04

## 2018-11-04 RX ADMIN — TRAMADOL HYDROCHLORIDE 50 MG: 50 TABLET ORAL at 11:11

## 2018-11-04 RX ADMIN — TRAMADOL HYDROCHLORIDE 50 MG: 50 TABLET, FILM COATED ORAL at 11:11

## 2018-11-05 NOTE — ED TRIAGE NOTES
Pt presents to ed c/o of left arm pain that started last night. Pt also reports headache and painful knot to the back of head behind left ear. Pt also reports productive cough that started about a week ago with accompanying weakness. Pt denies cp. Pt reporting feeling SOB starting yesterday morning Pt denies n/v. Pt denies visual disturbances. Pt axo 4.

## 2018-11-05 NOTE — ED PROVIDER NOTES
"Encounter Date: 11/4/2018    SCRIBE #1 NOTE: I, Sabi Perkins, am scribing for, and in the presence of, Dr. Sloan . the EKG reading and the APC attestation.       History     Chief Complaint   Patient presents with    Headache     Pt reports 'knot' behind his ear first noticed about three days ago. Pt reports lilly. Pt denies any drainge to the area or fevers.      Mr Terrell is a 54 y/o  male patient with a PMHx of A-Fib, CHF w EF 30-35%, CAD, HTN, CVA that presents to the ED complaining of productive cough with white sputum x 1 week, swelling to posterior left ear with headache and left elbow pain x 24 hours. Pt states that he has experienced the elbow pain before and has been evaluated for it. Pt attributes the elbow pain to possibly "sleeping on it" last night. Pt states that pain is exacerbated by extension of left arm. Pt denies any fevers, chest pain, abdominal pain, n/v/d.           Review of patient's allergies indicates:   Allergen Reactions    Acetaminophen      Itching    Oxycodone-acetaminophen      Other reaction(s): Itching    Ace inhibitors Other (See Comments)     cough     Past Medical History:   Diagnosis Date    Anticoagulant long-term use     Cardiomegaly     CHF (congestive heart failure)     Chronic combined systolic and diastolic congestive heart failure     Coronary artery disease     CVA (cerebrovascular accident)     Heart attack 2006    Hypertension     Hyperthyroidism, subclinical 1/2/2013    MI (myocardial infarction) 9/22/2013    MI in 2009      Paroxysmal atrial fibrillation     PE (pulmonary embolism) 1/1/2013    IN 2010     S/P ablation of atrial flutter 2008    Stroke 2009    no residual weaknesses     Past Surgical History:   Procedure Laterality Date    RADIOFREQUENCY ABLATION  01/08/2008    for atrial flutter     Family History   Problem Relation Age of Onset    Hypertension Mother     Stroke Mother     Hypertension Father     Alcohol abuse " Father     Hypertension Sister     Hypertension Brother      Social History     Tobacco Use    Smoking status: Never Smoker    Smokeless tobacco: Never Used   Substance Use Topics    Alcohol use: No    Drug use: No     Review of Systems   Constitutional: Negative for chills, diaphoresis and fever.   HENT: Negative for congestion, rhinorrhea, sinus pressure, sinus pain and sore throat.    Respiratory: Positive for cough and shortness of breath. Negative for wheezing.    Cardiovascular: Positive for palpitations and leg swelling. Negative for chest pain.   Gastrointestinal: Negative for abdominal pain, diarrhea, nausea and vomiting.   Genitourinary: Negative for difficulty urinating, dysuria and flank pain.   Musculoskeletal: Positive for arthralgias, joint swelling and neck pain. Negative for back pain and neck stiffness.   Neurological: Positive for weakness and headaches. Negative for dizziness, syncope, light-headedness and numbness.   Hematological: Bruises/bleeds easily (Pt on coumadin).       Physical Exam     Initial Vitals [11/04/18 2117]   BP Pulse Resp Temp SpO2   (!) 136/95 107 20 98.1 °F (36.7 °C) 98 %      MAP       --         Physical Exam    Constitutional: He appears well-developed and well-nourished. He is not diaphoretic. He is Obese . He is cooperative. He does not appear ill.   HENT:   Head: Normocephalic and atraumatic. Head is without abrasion, without contusion and without laceration.   Right Ear: Hearing, tympanic membrane, external ear and ear canal normal.   Left Ear: Hearing, tympanic membrane and ear canal normal. No drainage, swelling or tenderness. No foreign bodies. Tympanic membrane is not injected, not scarred, not perforated and not erythematous.  No middle ear effusion.   Pt has tenderness to left mastoid region on palpation. No erythema, fluctuance, swelling, induration noted.   Eyes: Conjunctivae, EOM and lids are normal. Pupils are equal, round, and reactive to light.    Neck: Neck supple. No JVD present.   Cardiovascular: Normal heart sounds and normal pulses. An irregular rhythm present.  Tachycardia present.  Exam reveals no gallop and no friction rub.    No murmur heard.  2+ LE edema to mid shin bilaterally    Pulmonary/Chest: Tachypnea noted. No respiratory distress. He has rales in the right lower field and the left lower field.   Abdominal: Soft. Bowel sounds are normal. He exhibits no distension. There is no tenderness. There is no rigidity, no guarding and no CVA tenderness.   Musculoskeletal:        Left elbow: He exhibits decreased range of motion (active extension of left arm ). He exhibits no swelling, no effusion and no laceration. No tenderness found.   Left elbow not painful to palpation. Only on extension of arm. Elbow is not warm to touch, no erythema or signs of infection noted.   Neurological: He is alert and oriented to person, place, and time. He displays no tremor. No cranial nerve deficit or sensory deficit. Gait normal.   LUE  strength 4/5. RUE  strength 5/5   Skin: Skin is warm, dry and intact. No pallor.   Psychiatric: He has a normal mood and affect. His speech is normal and behavior is normal.         ED Course   Procedures  Labs Reviewed   CBC W/ AUTO DIFFERENTIAL - Abnormal; Notable for the following components:       Result Value    RBC 3.61 (*)     Hemoglobin 10.2 (*)     Hematocrit 31.8 (*)     RDW 14.6 (*)     Mono # 1.3 (*)     Lymph% 16.4 (*)     Mono% 21.4 (*)     All other components within normal limits   COMPREHENSIVE METABOLIC PANEL - Abnormal; Notable for the following components:    CO2 22 (*)     BUN, Bld 22 (*)     Creatinine 1.6 (*)     Total Protein 8.6 (*)     Total Bilirubin 2.6 (*)     Alkaline Phosphatase 176 (*)     eGFR if  56.4 (*)     eGFR if non  48.8 (*)     All other components within normal limits   TROPONIN I - Abnormal; Notable for the following components:    Troponin I 0.089 (*)      All other components within normal limits   B-TYPE NATRIURETIC PEPTIDE - Abnormal; Notable for the following components:     (*)     All other components within normal limits   PROTIME-INR - Abnormal; Notable for the following components:    Prothrombin Time 24.7 (*)     INR 2.5 (*)     All other components within normal limits     Imaging Results          X-Ray Chest AP Portable (Final result)  Result time 11/04/18 22:39:52    Final result by Mauro Vanegas MD (11/04/18 22:39:52)                 Impression:      Stable examination findings of cardiomegaly with small right-sided pleural effusion and pulmonary vascular congestion, suggestive of congestive heart failure.    Stable airspace opacity in the right lung base.      Electronically signed by: Mauro Vanegas MD  Date:    11/04/2018  Time:    22:39             Narrative:    EXAMINATION:  XR CHEST AP PORTABLE    CLINICAL HISTORY:  CHF.    TECHNIQUE:  Single frontal view of the chest was performed.    COMPARISON:  09/06/2018.    FINDINGS:  Monitoring EKG leads are present.  The trachea is unremarkable.  There is stable enlargement of the cardiomediastinal silhouette.  There is a stable small right-sided pleural effusion.  No pleural effusion identified on the left.  There is no evidence of free air beneath the hemidiaphragms.  There is no evidence of a pneumothorax.  There is no evidence of pneumomediastinum.  There is stable pulmonary vascular congestion.  There is a stable airspace opacity in the right lung base.  The osseous structures are unremarkable.  The subcutaneous tissues are within normal limits.                                EKG Readings: (Independently Interpreted)   Initial Reading: No STEMI.   A-fib at a rate of 117 bpm. Intraventricular block.           Medical Decision Making:   Differential Diagnosis:   CHF- fluid overload  CAP  Acute Bronchitis    Clinical Tests:   Lab Tests: Ordered and Reviewed  Radiological Study: Ordered and  Reviewed  Medical Tests: Ordered and Reviewed  ED Management:  Mr Terrell is a 52 y/o  male patient with a PMHx of A-Fib, CHF w EF 30-35%, CAD, HTN, CVA that presents to the ED complaining of productive cough with white sputum x 1 week, swelling to posterior left ear with headache and left elbow pain x 24 hours. Exam findings of the left elbow are not suggestive of septic arthritis, gouty arthritis or any infectious process. Ace wrap will be applied to elbow with instructions to rest, elevate and ice the extremity. Exam findings of left mastoid area are not indicative of mastoiditis. Pt's headache managed with PO tramadol due to pt's allergy to tylenol. NSAIDs not given due to patient's coumadin. CBC, CMP, Troponin, BNP PT-INR, CXR ordered. CXR findings show stable chronic changes. Troponin chronically elevated and is at patient's baseline which is 0.089 today. BNP also elevated at 275 but is at pt's baseline. BUN 22 and creatinine 1.6 mildly elevated from patient's baseline. Hgb is at baseline as well. Pt's abnormal labs most likely due to overdiuresis. Plan is to discharge patient with close follow up with PCP. Pt instructed not to take his diuretics for tonight and tomorrow. He was discharged with no new prescriptions.  He will follow up with PCP.  All of the patient's questions were answered.  I reviewed the patient's chart, labs, and imaging and discussed the case with my supervising physician.             Scribe Attestation:   Scribe #1: I performed the above scribed service and the documentation accurately describes the services I performed. I attest to the accuracy of the note.    Attending Attestation:     Physician Attestation Statement for NP/PA:   I have conducted a face to face encounter with this patient in addition to the NP/PA, due to Medical Complexity    Other NP/PA Attestation Additions:    History of Present Illness: 53 year old male, with history of CHF, chronic kidney disease,  and chronic A-fib on Coumadin, presenting with left posterior ear pain, left elbow pain, and SOB. Pt states SOB is at baseline and chronic in nature. He admits to compliance with diuretics. Pt denies fever, orthopnea, or trauma.     Physical Exam: Constitutional: Generally well appearing.   Lungs: No acute respiratory distress. Lungs are clear to auscultation bilaterally.   Heart: Irregularly, irregular. Tachycardia.  Abdomen: Soft. Non-tender.  HEENT: Tenderness over the left mastoid bone with no erythema, fluctuance, or evidence of mastoiditis. Tympanic membranes clear bilaterally with no erythema or effusion. No JVD. No cervical lymphadenopathy.   Extremities: 2+ pitting edema to the mid-shin bilaterally. No left elbow tenderness, effusion, warmth, or redness.       Medical Decision Making: Labs, EKG, and CXR ordered.     11:36 PM   Labs reviewed showed no leukocytes. Hemoglobin at baseline. Creatinine is 1.6, up from 1.4. Bilirubin is 2.6. Troponin is chronically elevated, .089 today. BNP is 275. Abnormalities likely secondary to over diuresis. CXR with chronic changes. I will hold torsemide for one day and suggest follow up with PCP. I will wrap left elbow in ACE wrap. I suggest ice, rest, and elevation.    Differential diagnoses include, but not limited to, CHF exacerbation, PNA, and bronchitis.          Physician Attestation for Scribe:  Physician Attestation Statement for Scribe #1: I, Dr. Sloan , reviewed documentation, as scribed by Sabi Perkins in my presence, and it is both accurate and complete.                    Clinical Impression:     1. Cough    2. Shortness of breath    3. Renal insufficiency    4. Acute nonintractable headache, unspecified headache type                                   Tru De León PA-C  11/05/18 0025       Tru De León PA-C  11/05/18 0031

## 2018-11-05 NOTE — DISCHARGE INSTRUCTIONS
Please do not take your Torsemide, Losartan, Metalozone tonight and Monday. Resume all medications on Tuesday. Closely follow up with PCP tomorrow or as soon as possible. Please return to the ED if your symptoms persist or worsen or if you develop additional concerning symptoms.

## 2019-01-31 ENCOUNTER — HOSPITAL ENCOUNTER (EMERGENCY)
Facility: HOSPITAL | Age: 54
Discharge: HOME | End: 2019-01-31
Attending: EMERGENCY MEDICINE
Payer: MEDICAID

## 2019-01-31 VITALS
BODY MASS INDEX: 37.03 KG/M2 | HEART RATE: 92 BPM | RESPIRATION RATE: 24 BRPM | SYSTOLIC BLOOD PRESSURE: 130 MMHG | HEIGHT: 69 IN | OXYGEN SATURATION: 95 % | TEMPERATURE: 98 F | DIASTOLIC BLOOD PRESSURE: 73 MMHG | WEIGHT: 250 LBS

## 2019-01-31 DIAGNOSIS — I50.9 CONGESTIVE HEART FAILURE, UNSPECIFIED HF CHRONICITY, UNSPECIFIED HEART FAILURE TYPE: ICD-10-CM

## 2019-01-31 DIAGNOSIS — R05.9 COUGH: ICD-10-CM

## 2019-01-31 DIAGNOSIS — J40 BRONCHITIS: Primary | ICD-10-CM

## 2019-01-31 DIAGNOSIS — R07.9 CHEST PAIN: ICD-10-CM

## 2019-01-31 LAB
ALBUMIN SERPL BCP-MCNC: 3.7 G/DL
ALP SERPL-CCNC: 202 U/L
ALT SERPL W/O P-5'-P-CCNC: 9 U/L
ANION GAP SERPL CALC-SCNC: 13 MMOL/L
AST SERPL-CCNC: 19 U/L
BASOPHILS # BLD AUTO: 0.06 K/UL
BASOPHILS NFR BLD: 0.8 %
BILIRUB SERPL-MCNC: 2.5 MG/DL
BNP SERPL-MCNC: 397 PG/ML
BUN SERPL-MCNC: 23 MG/DL
CALCIUM SERPL-MCNC: 9.9 MG/DL
CHLORIDE SERPL-SCNC: 104 MMOL/L
CO2 SERPL-SCNC: 26 MMOL/L
CREAT SERPL-MCNC: 1.5 MG/DL
DIFFERENTIAL METHOD: ABNORMAL
EOSINOPHIL # BLD AUTO: 0.2 K/UL
EOSINOPHIL NFR BLD: 2.5 %
ERYTHROCYTE [DISTWIDTH] IN BLOOD BY AUTOMATED COUNT: 15.4 %
EST. GFR  (AFRICAN AMERICAN): >60 ML/MIN/1.73 M^2
EST. GFR  (NON AFRICAN AMERICAN): 52.8 ML/MIN/1.73 M^2
GLUCOSE SERPL-MCNC: 105 MG/DL
HCT VFR BLD AUTO: 31.4 %
HGB BLD-MCNC: 9.6 G/DL
IMM GRANULOCYTES # BLD AUTO: 0.01 K/UL
IMM GRANULOCYTES NFR BLD AUTO: 0.1 %
INR PPP: 1.6
LYMPHOCYTES # BLD AUTO: 1 K/UL
LYMPHOCYTES NFR BLD: 12.5 %
MCH RBC QN AUTO: 26.2 PG
MCHC RBC AUTO-ENTMCNC: 30.6 G/DL
MCV RBC AUTO: 86 FL
MONOCYTES # BLD AUTO: 1.5 K/UL
MONOCYTES NFR BLD: 19.2 %
NEUTROPHILS # BLD AUTO: 5 K/UL
NEUTROPHILS NFR BLD: 64.9 %
NRBC BLD-RTO: 0 /100 WBC
PLATELET # BLD AUTO: 263 K/UL
PMV BLD AUTO: 10 FL
POTASSIUM SERPL-SCNC: 3.6 MMOL/L
PROT SERPL-MCNC: 8.7 G/DL
PROTHROMBIN TIME: 15.3 SEC
RBC # BLD AUTO: 3.66 M/UL
SODIUM SERPL-SCNC: 143 MMOL/L
TROPONIN I SERPL DL<=0.01 NG/ML-MCNC: 0.1 NG/ML
WBC # BLD AUTO: 7.71 K/UL

## 2019-01-31 PROCEDURE — 25000242 PHARM REV CODE 250 ALT 637 W/ HCPCS: Performed by: EMERGENCY MEDICINE

## 2019-01-31 PROCEDURE — 84484 ASSAY OF TROPONIN QUANT: CPT

## 2019-01-31 PROCEDURE — 93010 ELECTROCARDIOGRAM REPORT: CPT | Mod: ,,, | Performed by: INTERNAL MEDICINE

## 2019-01-31 PROCEDURE — 96374 THER/PROPH/DIAG INJ IV PUSH: CPT

## 2019-01-31 PROCEDURE — 99285 EMERGENCY DEPT VISIT HI MDM: CPT | Mod: ,,, | Performed by: EMERGENCY MEDICINE

## 2019-01-31 PROCEDURE — 80053 COMPREHEN METABOLIC PANEL: CPT

## 2019-01-31 PROCEDURE — 96375 TX/PRO/DX INJ NEW DRUG ADDON: CPT

## 2019-01-31 PROCEDURE — 83880 ASSAY OF NATRIURETIC PEPTIDE: CPT

## 2019-01-31 PROCEDURE — 93005 ELECTROCARDIOGRAM TRACING: CPT

## 2019-01-31 PROCEDURE — 94640 AIRWAY INHALATION TREATMENT: CPT

## 2019-01-31 PROCEDURE — 85610 PROTHROMBIN TIME: CPT

## 2019-01-31 PROCEDURE — 99285 EMERGENCY DEPT VISIT HI MDM: CPT | Mod: 25

## 2019-01-31 PROCEDURE — 99285 PR EMERGENCY DEPT VISIT,LEVEL V: ICD-10-PCS | Mod: ,,, | Performed by: EMERGENCY MEDICINE

## 2019-01-31 PROCEDURE — 93010 EKG 12-LEAD: ICD-10-PCS | Mod: ,,, | Performed by: INTERNAL MEDICINE

## 2019-01-31 PROCEDURE — 85025 COMPLETE CBC W/AUTO DIFF WBC: CPT

## 2019-01-31 PROCEDURE — 63600175 PHARM REV CODE 636 W HCPCS: Performed by: EMERGENCY MEDICINE

## 2019-01-31 RX ORDER — BENZONATATE 100 MG/1
100 CAPSULE ORAL 3 TIMES DAILY PRN
Qty: 20 CAPSULE | Refills: 0 | Status: SHIPPED | OUTPATIENT
Start: 2019-01-31 | End: 2019-02-10

## 2019-01-31 RX ORDER — ALBUTEROL SULFATE 90 UG/1
1-2 AEROSOL, METERED RESPIRATORY (INHALATION) EVERY 6 HOURS PRN
Qty: 1 INHALER | Refills: 0 | Status: SHIPPED | OUTPATIENT
Start: 2019-01-31 | End: 2020-01-31

## 2019-01-31 RX ORDER — FUROSEMIDE 10 MG/ML
80 INJECTION INTRAMUSCULAR; INTRAVENOUS
Status: COMPLETED | OUTPATIENT
Start: 2019-01-31 | End: 2019-01-31

## 2019-01-31 RX ORDER — PREDNISONE 20 MG/1
20 TABLET ORAL DAILY
Qty: 2 TABLET | Refills: 0 | Status: SHIPPED | OUTPATIENT
Start: 2019-01-31 | End: 2019-02-02

## 2019-01-31 RX ORDER — ALBUTEROL SULFATE 90 UG/1
1-2 AEROSOL, METERED RESPIRATORY (INHALATION) EVERY 6 HOURS PRN
Qty: 1 INHALER | Refills: 0 | Status: SHIPPED | OUTPATIENT
Start: 2019-01-31 | End: 2019-01-31 | Stop reason: SDUPTHER

## 2019-01-31 RX ORDER — BENZONATATE 100 MG/1
100 CAPSULE ORAL 3 TIMES DAILY PRN
Qty: 20 CAPSULE | Refills: 0 | Status: SHIPPED | OUTPATIENT
Start: 2019-01-31 | End: 2019-01-31 | Stop reason: SDUPTHER

## 2019-01-31 RX ORDER — PREDNISONE 20 MG/1
20 TABLET ORAL DAILY
Qty: 2 TABLET | Refills: 0 | Status: SHIPPED | OUTPATIENT
Start: 2019-01-31 | End: 2019-01-31 | Stop reason: SDUPTHER

## 2019-01-31 RX ORDER — METHYLPREDNISOLONE SOD SUCC 125 MG
125 VIAL (EA) INJECTION
Status: DISCONTINUED | OUTPATIENT
Start: 2019-01-31 | End: 2019-01-31

## 2019-01-31 RX ORDER — IPRATROPIUM BROMIDE AND ALBUTEROL SULFATE 2.5; .5 MG/3ML; MG/3ML
3 SOLUTION RESPIRATORY (INHALATION)
Status: COMPLETED | OUTPATIENT
Start: 2019-01-31 | End: 2019-01-31

## 2019-01-31 RX ORDER — METHYLPREDNISOLONE SOD SUCC 125 MG
125 VIAL (EA) INJECTION
Status: COMPLETED | OUTPATIENT
Start: 2019-01-31 | End: 2019-01-31

## 2019-01-31 RX ADMIN — IPRATROPIUM BROMIDE AND ALBUTEROL SULFATE 3 ML: .5; 3 SOLUTION RESPIRATORY (INHALATION) at 11:01

## 2019-01-31 RX ADMIN — FUROSEMIDE 80 MG: 10 INJECTION, SOLUTION INTRAVENOUS at 01:01

## 2019-01-31 RX ADMIN — METHYLPREDNISOLONE SODIUM SUCCINATE 125 MG: 125 INJECTION, POWDER, FOR SOLUTION INTRAMUSCULAR; INTRAVENOUS at 11:01

## 2019-01-31 NOTE — ED NOTES
Pt returned from x-ray; replaced on continuous cardiac and pulse ox monitoring with blood pressure to cycle every 30 minutes.  Pt refusing oxygen; states it burns his nose.  VS stable; atrial fibrillation noted.  Bed locked in lowest position; side rails up and locked x ; pt sitting upright on side of bed for comfort. call light, bedside table, and personal belongings within reach.  Pt informed of expected wait time for results; instructed to alert nurse for assistance and before attempting to get out of bed.  Pt verbalizes understanding.  Pt denies needs or complaints at this time; will continue to monitor pt.

## 2019-01-31 NOTE — ED NOTES
Pt awake and alert; resting quietly on stretcher.  Pt remains on continuous cardiac and pulse ox monitoring with non-invasive blood pressure to cycle every 30 minutes.  VS stable;atrial fibrillation noted. Pt continues to complains of pain in chest with cough; no acute distress or additonal discomfort reported or observed.  Pt denies restroom needs at this time; urinal remains at bedside.  Pt remains in sitting upright position on side of bed for comfort; is able to reposition self on stretcher. Bed locked in lowest position; side rails up and locked x 1; call light, bedside table, and personal belongings within reach. Room assessed for safety measures and cleanliness; no action needed at this time.  Pt updated on expected wait time for lab and radiology results.  Pt instructed to alert nurse for assistance and before attempting to get out of bed; verbalizes understanding. Pt denies needs or complaints at this time; will continue to monitor.

## 2019-01-31 NOTE — ED TRIAGE NOTES
Pt to the ER with complaints of cough and pain in the chest related to cough since Tuesday.  Pt also reporting increased SOB; worse with exertion and laying flat.

## 2019-01-31 NOTE — ED NOTES
LOC: The patient is awake, alert, and oriented to place, time, situation. Affect is appropriate.  Speech is appropriate and clear.     APPEARANCE: Patient resting comfortably in no acute distress.  Patient is clean and well groomed.    SKIN: The skin is warm and dry; color consistent with ethnicity.  Patient has normal skin turgor and moist mucus membranes.  Skin intact; no breakdown or bruising noted.     MUSCULOSKELETAL: Patient moving upper and lower extremities without difficulty.  Denies weakness.     RESPIRATORY: Airway is open and patent. Respirations spontaneous and even, but slightly labored.  Patient has an increased effort and rate.  No accessory muscle use noted.  Lungs clear to auscultation though diminished throughout. Reports cough with production of clear sputum.     CARDIAC:  Atrial fibrillation with a HR in the 80's noted; pt is hypertensive. 3+ pitting edema noted to the BLE. Complains of chest pain related to cough.      ABDOMEN: Soft and non tender to palpation.  Moderate distention noted. Obsese    NEUROLOGIC: Eyes open spontaneously.  Behavior appropriate to situation.  Follows commands; facial expression symmetrical.  Purposeful motor response noted; normal sensation in all extremities.

## 2019-01-31 NOTE — ED PROVIDER NOTES
Encounter Date: 1/31/2019    SCRIBE #1 NOTE: I, Wilfredo Hernandez, am scribing for, and in the presence of,  Dr. Starr. I have scribed the following portions of the note - Other sections scribed: HPI, ROS, PE.       History     Chief Complaint   Patient presents with    Cough     Pt c/o cough & sneezing.  Onset tuesday.  (+) SOB     Time patient was seen by the provider: 10:55 AM      The patient is a 53 y.o. male with history of cardiomyopathy, afib, on coumadin, HTN, sytolic heart failure, CAD, CKD, CVA, BTE, MI, who presents to the ED with a complaint of worsening cough with associated chest pain and SOB. He states his symptoms started 2 days ago. His chest pain is exacerbated by coughing. He reports hemoptysis at first but now he is only coughing up mucous. Patient states he was unable to take his morning medications and vomited them up due to coughing. He denies a history of pneumonia and bronchitis. Patient denies using any over the counter medications for symptom control and recent antibiotic use. He denies fever, abdominal pain, nausea, diarrhea, lower extremity pain. He notes his lower extremity swelling is more than baseline. He endorses receiving his flu shot this year. Patient states his coumadin levels are check every 2 weeks.       The history is provided by the patient.     Review of patient's allergies indicates:   Allergen Reactions    Acetaminophen      Itching    Oxycodone-acetaminophen      Other reaction(s): Itching    Ace inhibitors Other (See Comments)     cough     Past Medical History:   Diagnosis Date    Anticoagulant long-term use     Cardiomegaly     CHF (congestive heart failure)     Chronic combined systolic and diastolic congestive heart failure     Coronary artery disease     CVA (cerebrovascular accident)     Heart attack 2006    Hypertension     Hyperthyroidism, subclinical 1/2/2013    MI (myocardial infarction) 9/22/2013    MI in 2009      Paroxysmal atrial  fibrillation     PE (pulmonary embolism) 1/1/2013    IN 2010     S/P ablation of atrial flutter 2008    Stroke 2009    no residual weaknesses     Past Surgical History:   Procedure Laterality Date    RADIOFREQUENCY ABLATION  01/08/2008    for atrial flutter     Family History   Problem Relation Age of Onset    Hypertension Mother     Stroke Mother     Hypertension Father     Alcohol abuse Father     Hypertension Sister     Hypertension Brother      Social History     Tobacco Use    Smoking status: Never Smoker    Smokeless tobacco: Never Used   Substance Use Topics    Alcohol use: No    Drug use: No     Review of Systems   Constitutional: Negative for fever.   HENT: Negative for sore throat.    Eyes: Negative for visual disturbance.   Respiratory: Positive for cough and shortness of breath.    Cardiovascular: Positive for chest pain and leg swelling (slightly worse than baseline).   Gastrointestinal: Negative for abdominal pain, diarrhea and nausea.   Genitourinary: Negative for difficulty urinating and dysuria.   Musculoskeletal: Negative for back pain.   Skin: Negative for rash.   Neurological: Negative for weakness.   All other systems reviewed and are negative.      Physical Exam     Initial Vitals [01/31/19 1015]   BP Pulse Resp Temp SpO2   (!) 150/78 106 20 98 °F (36.7 °C) (!) 90 %      MAP       --         Physical Exam    Nursing note and vitals reviewed.  Constitutional: He appears well-developed and well-nourished. He is not diaphoretic. No distress.   HENT:   Head: Normocephalic and atraumatic.   Mouth/Throat: Oropharynx is clear and moist.   Eyes: EOM are normal. Pupils are equal, round, and reactive to light.   Neck: Normal range of motion. Neck supple.   Cardiovascular: Normal rate, regular rhythm and normal heart sounds. Exam reveals no gallop and no friction rub.    No murmur heard.  Pulmonary/Chest: No respiratory distress. He has no wheezes. He has no rales.   Breath sounds diffusely  diminished. Persistent dry cough.    Abdominal: Soft. He exhibits no distension. There is no tenderness.   Musculoskeletal: He exhibits edema (1-2+ edema bilaterally).   No calf tenderness.    Neurological: He is alert and oriented to person, place, and time.   Skin: Skin is warm and dry. No rash noted.   Psychiatric: He has a normal mood and affect. His behavior is normal. Judgment and thought content normal.         ED Course   Procedures  Labs Reviewed   CBC W/ AUTO DIFFERENTIAL - Abnormal; Notable for the following components:       Result Value    RBC 3.66 (*)     Hemoglobin 9.6 (*)     Hematocrit 31.4 (*)     MCH 26.2 (*)     MCHC 30.6 (*)     RDW 15.4 (*)     Mono # 1.5 (*)     Lymph% 12.5 (*)     Mono% 19.2 (*)     All other components within normal limits   COMPREHENSIVE METABOLIC PANEL - Abnormal; Notable for the following components:    BUN, Bld 23 (*)     Creatinine 1.5 (*)     Total Protein 8.7 (*)     Total Bilirubin 2.5 (*)     Alkaline Phosphatase 202 (*)     ALT 9 (*)     eGFR if non  52.8 (*)     All other components within normal limits   TROPONIN I - Abnormal; Notable for the following components:    Troponin I 0.099 (*)     All other components within normal limits   B-TYPE NATRIURETIC PEPTIDE - Abnormal; Notable for the following components:     (*)     All other components within normal limits   PROTIME-INR     EKG Readings: (Independently Interpreted)   Initial Reading: No STEMI. Rhythm: Atrial Fibrillation. Heart Rate: 115. Axis: Left Axis Deviation.   Atrial fibrillation with RVR. Normal QTc. T wave abnormalities in anterior and lateral leads. No significant changes when compared to prior 11/4/2018.      ECG Results          EKG 12-lead (In process)  Result time 01/31/19 10:31:31    In process by Interface, Lab In [facility] (01/31/19 10:31:31)                 Narrative:    Test Reason :     Vent. Rate : 115 BPM     Atrial Rate : 066 BPM     P-R Int : 000 ms          QRS  Dur : 140 ms      QT Int : 342 ms       P-R-T Axes : 000 -48 140 degrees     QTc Int : 473 ms    Atrial fibrillation with rapid ventricular response with premature  ventricular or aberrantly conducted complexes  Left axis deviation  Nonspecific intraventricular block  Cannot rule out Septal infarct ,age undetermined  T wave abnormality, consider anterolateral ischemia  Abnormal ECG  When compared with ECG of 04-NOV-2018 21:41,  No significant change was found    Referred By: [email or web address]   SELF           Confirmed By:                             Imaging Results          X-Ray Chest PA And Lateral (Final result)  Result time 01/31/19 12:58:42    Final result by Zeb Hirsch MD (01/31/19 12:58:42)                 Impression:      Findings suggesting pulmonary edema/CHF pattern, without focal consolidation seen.  Interstitial pneumonia not excluded.      Electronically signed by: Zeb Hirsch MD  Date:    01/31/2019  Time:    12:58             Narrative:    EXAMINATION:  XR CHEST PA AND LATERAL    CLINICAL HISTORY:  Cough    TECHNIQUE:  PA and lateral views of the chest were performed.    COMPARISON:  Chest radiograph 11/04/2018 and CT abdomen and pelvis 11/12/2017    FINDINGS:  Patient is slightly rotated.  Large body habitus.  Monitoring leads overlie the lower right chest.    Grossly stable enlargement of the cardiomediastinal silhouette with prominence of the central pulmonary vasculature and bilateral diffuse interstitial coarsening with peribronchial cuffing suggesting pulmonary edema/CHF pattern.  Continued blunting of the right costophrenic angle with scattered bandlike opacities suggesting pleural thickening/scarring with platelike scarring versus atelectasis.  No large pleural effusion or pneumothorax definitively seen.  Upper mediastinal contours are unchanged.  Trachea is relatively midline.  Osseous structures appear stable without acute process seen.                                 Medical Decision  Making:   History:   Old Medical Records: I decided to obtain old medical records.  Old Records Summarized: other records.       <> Summary of Records: Last EF in October 2017 shows EF of 30-35%. Patient was never a smoker.   Independently Interpreted Test(s):   I have ordered and independently interpreted EKG Reading(s) - see prior notes  Clinical Tests:   Lab Tests: Ordered and Reviewed  Radiological Study: Ordered and Reviewed  Medical Tests: Ordered and Reviewed  ED Management:  12:26 PM  Upon reassessment patient looks more comfortable with no further coughing at this time.     1:35 PM  Patient currently stable. We discussed the findings of likely bronchitis and CHF. Patient admits he has not been able to take his medication including lasix for the past few days due to frequent coughing. Will give 80mg IV lasix here. Plan for discharge with albuterol inhaler, 2 more days of prednisone, and tessalon pearls. Patient is to follow up with PCP. Discussed return precautions. Patient understands and agrees.     2:50:  Reassess pt, well appearing, no further coughing, normal resp effort satting 95% on RA.  Has had 500cc UOP.  Discussed subtherapeutic INR since pt missed about 2 days of coumadin, advise starting taking it as scheduled and to f/u with the clinic for recheck.  Pt understand and agree w/ plan.            Scribe Attestation:   Scribe #1: I performed the above scribed service and the documentation accurately describes the services I performed. I attest to the accuracy of the note.               Clinical Impression:   The primary encounter diagnosis was Bronchitis. Diagnoses of Chest pain, Cough, and Congestive heart failure, unspecified HF chronicity, unspecified heart failure type were also pertinent to this visit.                             Monika Starr DO  01/31/19 3033

## 2019-01-31 NOTE — ED NOTES
Pt awake and alert; resting quietly on stretcher.  Pt remains on continuous cardiac and pulse ox monitoring with non-invasive blood pressure to cycle every 30 minutes.  VS stable;atrial fibrillation noted. Pt continues to complains of pain in chest with cough but is reporting some relief in cough and pain; no acute distress or additonal discomfort reported or observed.  Pt denies restroom needs at this time; urinal remains at bedside.  Pt remains in sitting upright position on stretcher; is able to reposition self on stretcher. Bed locked in lowest position; side rails up and locked x 2; call light, bedside table, and personal belongings within reach. Room assessed for safety measures and cleanliness; no action needed at this time.  Pt updated on expected wait time for lab and radiology results.  Pt instructed to alert nurse for assistance and before attempting to get out of bed; verbalizes understanding. Pt denies needs or complaints at this time; will continue to monitor.

## 2019-04-15 ENCOUNTER — HOSPITAL ENCOUNTER (INPATIENT)
Facility: HOSPITAL | Age: 54
LOS: 8 days | Discharge: HOME HEALTH | DRG: 291 | End: 2019-04-23
Attending: EMERGENCY MEDICINE | Admitting: INTERNAL MEDICINE
Payer: MEDICAID

## 2019-04-15 DIAGNOSIS — R06.02 SHORTNESS OF BREATH: ICD-10-CM

## 2019-04-15 DIAGNOSIS — I50.23 ACUTE ON CHRONIC SYSTOLIC CONGESTIVE HEART FAILURE: ICD-10-CM

## 2019-04-15 DIAGNOSIS — I10 ESSENTIAL HYPERTENSION: Chronic | ICD-10-CM

## 2019-04-15 DIAGNOSIS — I50.43 ACUTE ON CHRONIC COMBINED SYSTOLIC (CONGESTIVE) AND DIASTOLIC (CONGESTIVE) HEART FAILURE: Primary | ICD-10-CM

## 2019-04-15 DIAGNOSIS — R60.9 EDEMA: ICD-10-CM

## 2019-04-15 DIAGNOSIS — M54.50 LUMBAR BACK PAIN: ICD-10-CM

## 2019-04-15 DIAGNOSIS — Z79.01 CHRONIC ANTICOAGULATION: Chronic | ICD-10-CM

## 2019-04-15 DIAGNOSIS — I48.20 ATRIAL FIBRILLATION, CHRONIC: Chronic | ICD-10-CM

## 2019-04-15 DIAGNOSIS — Z74.09 IMPAIRED MOBILITY: ICD-10-CM

## 2019-04-15 DIAGNOSIS — R79.1 ELEVATED INR: ICD-10-CM

## 2019-04-15 DIAGNOSIS — N17.9 AKI (ACUTE KIDNEY INJURY): ICD-10-CM

## 2019-04-15 LAB
ALBUMIN SERPL BCP-MCNC: 3.7 G/DL (ref 3.5–5.2)
ALP SERPL-CCNC: 225 U/L (ref 55–135)
ALT SERPL W/O P-5'-P-CCNC: 10 U/L (ref 10–44)
ANION GAP SERPL CALC-SCNC: 10 MMOL/L (ref 8–16)
AST SERPL-CCNC: 16 U/L (ref 10–40)
BASOPHILS # BLD AUTO: 0.04 K/UL (ref 0–0.2)
BASOPHILS NFR BLD: 0.7 % (ref 0–1.9)
BILIRUB SERPL-MCNC: 2.3 MG/DL (ref 0.1–1)
BNP SERPL-MCNC: 431 PG/ML (ref 0–99)
BUN SERPL-MCNC: 27 MG/DL (ref 6–20)
CALCIUM SERPL-MCNC: 9.9 MG/DL (ref 8.7–10.5)
CHLORIDE SERPL-SCNC: 105 MMOL/L (ref 95–110)
CO2 SERPL-SCNC: 29 MMOL/L (ref 23–29)
CREAT SERPL-MCNC: 1.3 MG/DL (ref 0.5–1.4)
DIFFERENTIAL METHOD: ABNORMAL
EOSINOPHIL # BLD AUTO: 0.3 K/UL (ref 0–0.5)
EOSINOPHIL NFR BLD: 4.7 % (ref 0–8)
ERYTHROCYTE [DISTWIDTH] IN BLOOD BY AUTOMATED COUNT: 15.6 % (ref 11.5–14.5)
EST. GFR  (AFRICAN AMERICAN): >60 ML/MIN/1.73 M^2
EST. GFR  (NON AFRICAN AMERICAN): >60 ML/MIN/1.73 M^2
GLUCOSE SERPL-MCNC: 78 MG/DL (ref 70–110)
HCT VFR BLD AUTO: 31.1 % (ref 40–54)
HGB BLD-MCNC: 9.5 G/DL (ref 14–18)
IMM GRANULOCYTES # BLD AUTO: 0.03 K/UL (ref 0–0.04)
IMM GRANULOCYTES NFR BLD AUTO: 0.5 % (ref 0–0.5)
INR PPP: 1.8 (ref 0.8–1.2)
LYMPHOCYTES # BLD AUTO: 1.1 K/UL (ref 1–4.8)
LYMPHOCYTES NFR BLD: 19.1 % (ref 18–48)
MCH RBC QN AUTO: 26.1 PG (ref 27–31)
MCHC RBC AUTO-ENTMCNC: 30.5 G/DL (ref 32–36)
MCV RBC AUTO: 85 FL (ref 82–98)
MONOCYTES # BLD AUTO: 1.1 K/UL (ref 0.3–1)
MONOCYTES NFR BLD: 18.1 % (ref 4–15)
NEUTROPHILS # BLD AUTO: 3.4 K/UL (ref 1.8–7.7)
NEUTROPHILS NFR BLD: 56.9 % (ref 38–73)
NRBC BLD-RTO: 0 /100 WBC
PLATELET # BLD AUTO: 197 K/UL (ref 150–350)
PMV BLD AUTO: 9.6 FL (ref 9.2–12.9)
POTASSIUM SERPL-SCNC: 3.6 MMOL/L (ref 3.5–5.1)
PROT SERPL-MCNC: 8.6 G/DL (ref 6–8.4)
PROTHROMBIN TIME: 17.6 SEC (ref 9–12.5)
RBC # BLD AUTO: 3.64 M/UL (ref 4.6–6.2)
SODIUM SERPL-SCNC: 144 MMOL/L (ref 136–145)
TROPONIN I SERPL DL<=0.01 NG/ML-MCNC: 0.07 NG/ML (ref 0–0.03)
WBC # BLD AUTO: 5.91 K/UL (ref 3.9–12.7)

## 2019-04-15 PROCEDURE — 93010 ELECTROCARDIOGRAM REPORT: CPT | Mod: ,,, | Performed by: INTERNAL MEDICINE

## 2019-04-15 PROCEDURE — 85025 COMPLETE CBC W/AUTO DIFF WBC: CPT

## 2019-04-15 PROCEDURE — 12000002 HC ACUTE/MED SURGE SEMI-PRIVATE ROOM

## 2019-04-15 PROCEDURE — 80053 COMPREHEN METABOLIC PANEL: CPT

## 2019-04-15 PROCEDURE — 99285 EMERGENCY DEPT VISIT HI MDM: CPT | Mod: ,,, | Performed by: EMERGENCY MEDICINE

## 2019-04-15 PROCEDURE — 63600175 PHARM REV CODE 636 W HCPCS: Performed by: EMERGENCY MEDICINE

## 2019-04-15 PROCEDURE — 83880 ASSAY OF NATRIURETIC PEPTIDE: CPT

## 2019-04-15 PROCEDURE — 85610 PROTHROMBIN TIME: CPT

## 2019-04-15 PROCEDURE — 99285 EMERGENCY DEPT VISIT HI MDM: CPT | Mod: 25

## 2019-04-15 PROCEDURE — 93005 ELECTROCARDIOGRAM TRACING: CPT

## 2019-04-15 PROCEDURE — 96374 THER/PROPH/DIAG INJ IV PUSH: CPT

## 2019-04-15 PROCEDURE — 99285 PR EMERGENCY DEPT VISIT,LEVEL V: ICD-10-PCS | Mod: ,,, | Performed by: EMERGENCY MEDICINE

## 2019-04-15 PROCEDURE — 84484 ASSAY OF TROPONIN QUANT: CPT

## 2019-04-15 PROCEDURE — 93010 EKG 12-LEAD: ICD-10-PCS | Mod: ,,, | Performed by: INTERNAL MEDICINE

## 2019-04-15 RX ORDER — FUROSEMIDE 10 MG/ML
80 INJECTION INTRAMUSCULAR; INTRAVENOUS
Status: COMPLETED | OUTPATIENT
Start: 2019-04-15 | End: 2019-04-15

## 2019-04-15 RX ADMIN — FUROSEMIDE 80 MG: 10 INJECTION, SOLUTION INTRAMUSCULAR; INTRAVENOUS at 11:04

## 2019-04-16 LAB
ALBUMIN SERPL BCP-MCNC: 3.8 G/DL (ref 3.5–5.2)
ALP SERPL-CCNC: 209 U/L (ref 55–135)
ALT SERPL W/O P-5'-P-CCNC: 9 U/L (ref 10–44)
ANION GAP SERPL CALC-SCNC: 13 MMOL/L (ref 8–16)
ANION GAP SERPL CALC-SCNC: 9 MMOL/L (ref 8–16)
ASCENDING AORTA: 3.68 CM
AST SERPL-CCNC: 15 U/L (ref 10–40)
AV INDEX (PROSTH): 0.52
AV MEAN GRADIENT: 3.07 MMHG
AV PEAK GRADIENT: 5.29 MMHG
AV VALVE AREA: 2.59 CM2
AV VELOCITY RATIO: 0.57
BASOPHILS # BLD AUTO: 0.03 K/UL (ref 0–0.2)
BASOPHILS NFR BLD: 0.4 % (ref 0–1.9)
BILIRUB SERPL-MCNC: 2.6 MG/DL (ref 0.1–1)
BILIRUB UR QL STRIP: NEGATIVE
BSA FOR ECHO PROCEDURE: 2.45 M2
BUN SERPL-MCNC: 26 MG/DL (ref 6–20)
BUN SERPL-MCNC: 28 MG/DL (ref 6–20)
CALCIUM SERPL-MCNC: 10 MG/DL (ref 8.7–10.5)
CALCIUM SERPL-MCNC: 9.5 MG/DL (ref 8.7–10.5)
CHLORIDE SERPL-SCNC: 100 MMOL/L (ref 95–110)
CHLORIDE SERPL-SCNC: 97 MMOL/L (ref 95–110)
CHOLEST SERPL-MCNC: 138 MG/DL (ref 120–199)
CHOLEST/HDLC SERPL: 4.2 {RATIO} (ref 2–5)
CLARITY UR REFRACT.AUTO: CLEAR
CO2 SERPL-SCNC: 29 MMOL/L (ref 23–29)
CO2 SERPL-SCNC: 32 MMOL/L (ref 23–29)
COLOR UR AUTO: NORMAL
CREAT SERPL-MCNC: 1.4 MG/DL (ref 0.5–1.4)
CREAT SERPL-MCNC: 1.5 MG/DL (ref 0.5–1.4)
CV ECHO LV RWT: 0.41 CM
DIFFERENTIAL METHOD: ABNORMAL
DOP CALC AO PEAK VEL: 1.15 M/S
DOP CALC AO VTI: 18.57 CM
DOP CALC LVOT AREA: 4.99 CM2
DOP CALC LVOT DIAMETER: 2.52 CM
DOP CALC LVOT PEAK VEL: 0.66 M/S
DOP CALC LVOT STROKE VOLUME: 48.06 CM3
DOP CALCLVOT PEAK VEL VTI: 9.64 CM
E/E' RATIO: 23.2
ECHO LV POSTERIOR WALL: 1.21 CM (ref 0.6–1.1)
EOSINOPHIL # BLD AUTO: 0.3 K/UL (ref 0–0.5)
EOSINOPHIL NFR BLD: 4.1 % (ref 0–8)
ERYTHROCYTE [DISTWIDTH] IN BLOOD BY AUTOMATED COUNT: 15.9 % (ref 11.5–14.5)
EST. GFR  (AFRICAN AMERICAN): >60 ML/MIN/1.73 M^2
EST. GFR  (AFRICAN AMERICAN): >60 ML/MIN/1.73 M^2
EST. GFR  (NON AFRICAN AMERICAN): 52.8 ML/MIN/1.73 M^2
EST. GFR  (NON AFRICAN AMERICAN): 57.4 ML/MIN/1.73 M^2
FRACTIONAL SHORTENING: 13 % (ref 28–44)
GLUCOSE SERPL-MCNC: 103 MG/DL (ref 70–110)
GLUCOSE SERPL-MCNC: 112 MG/DL (ref 70–110)
GLUCOSE UR QL STRIP: NEGATIVE
HCT VFR BLD AUTO: 30.1 % (ref 40–54)
HDLC SERPL-MCNC: 33 MG/DL (ref 40–75)
HDLC SERPL: 23.9 % (ref 20–50)
HGB BLD-MCNC: 9 G/DL (ref 14–18)
HGB UR QL STRIP: NEGATIVE
IMM GRANULOCYTES # BLD AUTO: 0.02 K/UL (ref 0–0.04)
IMM GRANULOCYTES NFR BLD AUTO: 0.3 % (ref 0–0.5)
INR PPP: 1.8 (ref 0.8–1.2)
INTERVENTRICULAR SEPTUM: 1.21 CM (ref 0.6–1.1)
IVRT: 0.09 MSEC
KETONES UR QL STRIP: NEGATIVE
LA MAJOR: 7.29 CM
LA MINOR: 7.51 CM
LA WIDTH: 5.16 CM
LDLC SERPL CALC-MCNC: 90.4 MG/DL (ref 63–159)
LEFT ATRIUM SIZE: 6.18 CM
LEFT ATRIUM VOLUME INDEX: 85.3 ML/M2
LEFT ATRIUM VOLUME: 200.54 CM3
LEFT INTERNAL DIMENSION IN SYSTOLE: 5.12 CM (ref 2.1–4)
LEFT VENTRICLE DIASTOLIC VOLUME INDEX: 73.24 ML/M2
LEFT VENTRICLE DIASTOLIC VOLUME: 172.13 ML
LEFT VENTRICLE MASS INDEX: 130.7 G/M2
LEFT VENTRICLE SYSTOLIC VOLUME INDEX: 53.1 ML/M2
LEFT VENTRICLE SYSTOLIC VOLUME: 124.91 ML
LEFT VENTRICULAR INTERNAL DIMENSION IN DIASTOLE: 5.88 CM (ref 3.5–6)
LEFT VENTRICULAR MASS: 307.18 G
LEUKOCYTE ESTERASE UR QL STRIP: NEGATIVE
LV LATERAL E/E' RATIO: 19.33
LV SEPTAL E/E' RATIO: 29
LYMPHOCYTES # BLD AUTO: 1 K/UL (ref 1–4.8)
LYMPHOCYTES NFR BLD: 14.1 % (ref 18–48)
MAGNESIUM SERPL-MCNC: 1.4 MG/DL (ref 1.6–2.6)
MAGNESIUM SERPL-MCNC: 1.5 MG/DL (ref 1.6–2.6)
MCH RBC QN AUTO: 26.2 PG (ref 27–31)
MCHC RBC AUTO-ENTMCNC: 29.9 G/DL (ref 32–36)
MCV RBC AUTO: 88 FL (ref 82–98)
MONOCYTES # BLD AUTO: 1.1 K/UL (ref 0.3–1)
MONOCYTES NFR BLD: 15.6 % (ref 4–15)
MV PEAK E VEL: 1.16 M/S
NEUTROPHILS # BLD AUTO: 4.4 K/UL (ref 1.8–7.7)
NEUTROPHILS NFR BLD: 65.5 % (ref 38–73)
NITRITE UR QL STRIP: NEGATIVE
NONHDLC SERPL-MCNC: 105 MG/DL
NRBC BLD-RTO: 0 /100 WBC
PH UR STRIP: 6 [PH] (ref 5–8)
PISA TR MAX VEL: 2.85 M/S
PLATELET # BLD AUTO: 218 K/UL (ref 150–350)
PMV BLD AUTO: 10.5 FL (ref 9.2–12.9)
POTASSIUM SERPL-SCNC: 3.5 MMOL/L (ref 3.5–5.1)
POTASSIUM SERPL-SCNC: 3.8 MMOL/L (ref 3.5–5.1)
PROT SERPL-MCNC: 8.7 G/DL (ref 6–8.4)
PROT UR QL STRIP: NEGATIVE
PROTHROMBIN TIME: 17.4 SEC (ref 9–12.5)
RA MAJOR: 7.04 CM
RA PRESSURE: 15 MMHG
RA WIDTH: 4.87 CM
RBC # BLD AUTO: 3.44 M/UL (ref 4.6–6.2)
RIGHT VENTRICULAR END-DIASTOLIC DIMENSION: 5.78 CM
RV TISSUE DOPPLER FREE WALL SYSTOLIC VELOCITY 1 (APICAL 4 CHAMBER VIEW): 5.9 M/S
SINUS: 3.36 CM
SODIUM SERPL-SCNC: 138 MMOL/L (ref 136–145)
SODIUM SERPL-SCNC: 142 MMOL/L (ref 136–145)
SP GR UR STRIP: 1 (ref 1–1.03)
STJ: 3.01 CM
TDI LATERAL: 0.06
TDI SEPTAL: 0.04
TDI: 0.05
TR MAX PG: 32.49 MMHG
TRICUSPID ANNULAR PLANE SYSTOLIC EXCURSION: 1.56 CM
TRIGL SERPL-MCNC: 73 MG/DL (ref 30–150)
TROPONIN I SERPL DL<=0.01 NG/ML-MCNC: 0.07 NG/ML (ref 0–0.03)
TROPONIN I SERPL DL<=0.01 NG/ML-MCNC: 0.08 NG/ML (ref 0–0.03)
TV REST PULMONARY ARTERY PRESSURE: 47 MMHG
URN SPEC COLLECT METH UR: NORMAL
WBC # BLD AUTO: 6.75 K/UL (ref 3.9–12.7)

## 2019-04-16 PROCEDURE — 99220 PR INITIAL OBSERVATION CARE,LEVL III: CPT | Mod: ,,, | Performed by: PHYSICIAN ASSISTANT

## 2019-04-16 PROCEDURE — G0378 HOSPITAL OBSERVATION PER HR: HCPCS

## 2019-04-16 PROCEDURE — 84484 ASSAY OF TROPONIN QUANT: CPT | Mod: 91

## 2019-04-16 PROCEDURE — 85025 COMPLETE CBC W/AUTO DIFF WBC: CPT

## 2019-04-16 PROCEDURE — 63600175 PHARM REV CODE 636 W HCPCS: Performed by: PHYSICIAN ASSISTANT

## 2019-04-16 PROCEDURE — 84484 ASSAY OF TROPONIN QUANT: CPT

## 2019-04-16 PROCEDURE — 63600175 PHARM REV CODE 636 W HCPCS: Performed by: INTERNAL MEDICINE

## 2019-04-16 PROCEDURE — 80053 COMPREHEN METABOLIC PANEL: CPT

## 2019-04-16 PROCEDURE — 25000003 PHARM REV CODE 250: Performed by: PHYSICIAN ASSISTANT

## 2019-04-16 PROCEDURE — 83735 ASSAY OF MAGNESIUM: CPT

## 2019-04-16 PROCEDURE — 81003 URINALYSIS AUTO W/O SCOPE: CPT

## 2019-04-16 PROCEDURE — 83735 ASSAY OF MAGNESIUM: CPT | Mod: 91

## 2019-04-16 PROCEDURE — 80061 LIPID PANEL: CPT

## 2019-04-16 PROCEDURE — 36415 COLL VENOUS BLD VENIPUNCTURE: CPT

## 2019-04-16 PROCEDURE — 80048 BASIC METABOLIC PNL TOTAL CA: CPT

## 2019-04-16 PROCEDURE — 99220 PR INITIAL OBSERVATION CARE,LEVL III: ICD-10-PCS | Mod: ,,, | Performed by: PHYSICIAN ASSISTANT

## 2019-04-16 PROCEDURE — 85610 PROTHROMBIN TIME: CPT

## 2019-04-16 PROCEDURE — 11000001 HC ACUTE MED/SURG PRIVATE ROOM

## 2019-04-16 RX ORDER — FUROSEMIDE 10 MG/ML
80 INJECTION INTRAMUSCULAR; INTRAVENOUS 3 TIMES DAILY
Status: DISCONTINUED | OUTPATIENT
Start: 2019-04-16 | End: 2019-04-17

## 2019-04-16 RX ORDER — TIZANIDINE 4 MG/1
4 TABLET ORAL EVERY 8 HOURS
Status: DISCONTINUED | OUTPATIENT
Start: 2019-04-16 | End: 2019-04-23 | Stop reason: HOSPADM

## 2019-04-16 RX ORDER — PROCHLORPERAZINE EDISYLATE 5 MG/ML
5 INJECTION INTRAMUSCULAR; INTRAVENOUS EVERY 6 HOURS PRN
Status: DISCONTINUED | OUTPATIENT
Start: 2019-04-16 | End: 2019-04-23 | Stop reason: HOSPADM

## 2019-04-16 RX ORDER — FUROSEMIDE 40 MG/1
80 TABLET ORAL 2 TIMES DAILY
Status: ON HOLD | COMMUNITY
End: 2019-07-17 | Stop reason: HOSPADM

## 2019-04-16 RX ORDER — OXYCODONE HYDROCHLORIDE 5 MG/1
5 TABLET ORAL EVERY 6 HOURS PRN
Status: DISCONTINUED | OUTPATIENT
Start: 2019-04-16 | End: 2019-04-18

## 2019-04-16 RX ORDER — FUROSEMIDE 10 MG/ML
80 INJECTION INTRAMUSCULAR; INTRAVENOUS 2 TIMES DAILY
Status: DISCONTINUED | OUTPATIENT
Start: 2019-04-16 | End: 2019-04-16

## 2019-04-16 RX ORDER — ONDANSETRON 8 MG/1
8 TABLET, ORALLY DISINTEGRATING ORAL EVERY 8 HOURS PRN
Status: DISCONTINUED | OUTPATIENT
Start: 2019-04-16 | End: 2019-04-23 | Stop reason: HOSPADM

## 2019-04-16 RX ORDER — METOLAZONE 2.5 MG/1
2.5 TABLET ORAL DAILY
Status: DISCONTINUED | OUTPATIENT
Start: 2019-04-16 | End: 2019-04-18

## 2019-04-16 RX ORDER — ASPIRIN 81 MG/1
81 TABLET ORAL DAILY
Status: DISCONTINUED | OUTPATIENT
Start: 2019-04-16 | End: 2019-04-23 | Stop reason: HOSPADM

## 2019-04-16 RX ORDER — KETOROLAC TROMETHAMINE 30 MG/ML
30 INJECTION, SOLUTION INTRAMUSCULAR; INTRAVENOUS ONCE
Status: COMPLETED | OUTPATIENT
Start: 2019-04-16 | End: 2019-04-16

## 2019-04-16 RX ORDER — HYDROMORPHONE HYDROCHLORIDE 1 MG/ML
0.5 INJECTION, SOLUTION INTRAMUSCULAR; INTRAVENOUS; SUBCUTANEOUS EVERY 6 HOURS PRN
Status: DISCONTINUED | OUTPATIENT
Start: 2019-04-16 | End: 2019-04-19

## 2019-04-16 RX ORDER — ALBUTEROL SULFATE 90 UG/1
2 AEROSOL, METERED RESPIRATORY (INHALATION) EVERY 6 HOURS PRN
Status: DISCONTINUED | OUTPATIENT
Start: 2019-04-16 | End: 2019-04-23 | Stop reason: HOSPADM

## 2019-04-16 RX ORDER — LIDOCAINE 50 MG/G
1 PATCH TOPICAL
Status: DISCONTINUED | OUTPATIENT
Start: 2019-04-16 | End: 2019-04-17

## 2019-04-16 RX ORDER — TIZANIDINE 4 MG/1
4 TABLET ORAL EVERY 8 HOURS PRN
Status: DISCONTINUED | OUTPATIENT
Start: 2019-04-16 | End: 2019-04-16

## 2019-04-16 RX ORDER — MAGNESIUM SULFATE HEPTAHYDRATE 40 MG/ML
2 INJECTION, SOLUTION INTRAVENOUS ONCE
Status: COMPLETED | OUTPATIENT
Start: 2019-04-16 | End: 2019-04-16

## 2019-04-16 RX ORDER — NITROGLYCERIN 0.4 MG/1
0.4 TABLET SUBLINGUAL EVERY 5 MIN PRN
Status: DISCONTINUED | OUTPATIENT
Start: 2019-04-16 | End: 2019-04-23 | Stop reason: HOSPADM

## 2019-04-16 RX ORDER — OXYCODONE HYDROCHLORIDE 5 MG/1
10 TABLET ORAL EVERY 4 HOURS PRN
Status: DISCONTINUED | OUTPATIENT
Start: 2019-04-16 | End: 2019-04-18

## 2019-04-16 RX ORDER — SODIUM CHLORIDE 0.9 % (FLUSH) 0.9 %
10 SYRINGE (ML) INJECTION
Status: DISCONTINUED | OUTPATIENT
Start: 2019-04-16 | End: 2019-04-23 | Stop reason: HOSPADM

## 2019-04-16 RX ORDER — POTASSIUM CHLORIDE 20 MEQ/1
40 TABLET, EXTENDED RELEASE ORAL ONCE
Status: DISCONTINUED | OUTPATIENT
Start: 2019-04-16 | End: 2019-04-16

## 2019-04-16 RX ORDER — LANOLIN ALCOHOL/MO/W.PET/CERES
400 CREAM (GRAM) TOPICAL ONCE
Status: COMPLETED | OUTPATIENT
Start: 2019-04-16 | End: 2019-04-16

## 2019-04-16 RX ORDER — ACETAMINOPHEN 325 MG/1
650 TABLET ORAL EVERY 4 HOURS PRN
Status: DISCONTINUED | OUTPATIENT
Start: 2019-04-16 | End: 2019-04-16

## 2019-04-16 RX ORDER — HYDROCODONE BITARTRATE AND ACETAMINOPHEN 5; 325 MG/1; MG/1
1 TABLET ORAL EVERY 4 HOURS PRN
Status: DISCONTINUED | OUTPATIENT
Start: 2019-04-16 | End: 2019-04-16

## 2019-04-16 RX ADMIN — ONDANSETRON 8 MG: 8 TABLET, ORALLY DISINTEGRATING ORAL at 03:04

## 2019-04-16 RX ADMIN — FUROSEMIDE 80 MG: 10 INJECTION, SOLUTION INTRAVENOUS at 11:04

## 2019-04-16 RX ADMIN — OXYCODONE HYDROCHLORIDE 10 MG: 5 TABLET ORAL at 02:04

## 2019-04-16 RX ADMIN — KETOROLAC TROMETHAMINE 30 MG: 30 INJECTION, SOLUTION INTRAMUSCULAR at 05:04

## 2019-04-16 RX ADMIN — MAGNESIUM SULFATE IN WATER 2 G: 40 INJECTION, SOLUTION INTRAVENOUS at 09:04

## 2019-04-16 RX ADMIN — ASPIRIN 81 MG: 81 TABLET, COATED ORAL at 08:04

## 2019-04-16 RX ADMIN — OXYCODONE HYDROCHLORIDE 10 MG: 5 TABLET ORAL at 08:04

## 2019-04-16 RX ADMIN — HUMAN ALBUMIN MICROSPHERES AND PERFLUTREN 0.66 MG: 10; .22 INJECTION, SOLUTION INTRAVENOUS at 11:04

## 2019-04-16 RX ADMIN — METOLAZONE 2.5 MG: 2.5 TABLET ORAL at 08:04

## 2019-04-16 RX ADMIN — MAGNESIUM OXIDE TAB 400 MG (241.3 MG ELEMENTAL MG) 400 MG: 400 (241.3 MG) TAB at 08:04

## 2019-04-16 RX ADMIN — TIZANIDINE 4 MG: 4 TABLET ORAL at 06:04

## 2019-04-16 RX ADMIN — LIDOCAINE 1 PATCH: 50 PATCH TOPICAL at 12:04

## 2019-04-16 RX ADMIN — OXYCODONE HYDROCHLORIDE 10 MG: 5 TABLET ORAL at 03:04

## 2019-04-16 RX ADMIN — WARFARIN SODIUM 12 MG: 5 TABLET ORAL at 05:04

## 2019-04-16 RX ADMIN — FUROSEMIDE 80 MG: 10 INJECTION, SOLUTION INTRAVENOUS at 05:04

## 2019-04-16 RX ADMIN — PROCHLORPERAZINE EDISYLATE 5 MG: 5 INJECTION INTRAMUSCULAR; INTRAVENOUS at 07:04

## 2019-04-16 NOTE — ED PROVIDER NOTES
Encounter Date: 4/15/2019    SCRIBE #1 NOTE: I, Yuki Nowak, am scribing for, and in the presence of,  Luis Brooks MD. I have scribed the entire note.       History     Chief Complaint   Patient presents with    Edema     hx chf, inc lasix 40mg  2 in morning and 2 in evening 3 weeks ago and no better    Congestive Heart Failure     The patient is a 53 y.o. male with PMHx of HTN, Chronic systolic and diastolic congestive heart failure, CVA, CAD, MI, who presents to the ED for bilateral leg swelling. Patient reports an increase in Lasix dose 3 weeks ago. However, patient states his leg swelling has gotten worse with associated symptoms of worsening shortness of breath, weight gain, decrease in urine output and lower back pain within the last 2 weeks. He reports having a 30 lb increase in weight within the last 2 weeks. Denies cheat pain. He reports being complaint with his medication, taking 40 mg twice a day. Denies any change in diet.     The history is provided by the patient.     Review of patient's allergies indicates:   Allergen Reactions    Acetaminophen      Itching    Oxycodone-acetaminophen      Other reaction(s): Itching    Ace inhibitors Other (See Comments)     cough     Past Medical History:   Diagnosis Date    Anticoagulant long-term use     Cardiomegaly     CHF (congestive heart failure)     Chronic combined systolic and diastolic congestive heart failure     Coronary artery disease     CVA (cerebrovascular accident)     Heart attack 2006    Hypertension     Hyperthyroidism, subclinical 1/2/2013    MI (myocardial infarction) 9/22/2013    MI in 2009      Paroxysmal atrial fibrillation     PE (pulmonary embolism) 1/1/2013    IN 2010     S/P ablation of atrial flutter 2008    Stroke 2009    no residual weaknesses     Past Surgical History:   Procedure Laterality Date    RADIOFREQUENCY ABLATION  01/08/2008    for atrial flutter     Family History   Problem Relation Age of Onset     Hypertension Mother     Stroke Mother     Hypertension Father     Alcohol abuse Father     Hypertension Sister     Hypertension Brother      Social History     Tobacco Use    Smoking status: Never Smoker    Smokeless tobacco: Never Used   Substance Use Topics    Alcohol use: No    Drug use: No     Review of Systems   Constitutional: Positive for unexpected weight change (30 lb weight gain). Negative for fever.   HENT: Negative for sore throat.    Respiratory: Positive for shortness of breath.    Cardiovascular: Positive for leg swelling. Negative for chest pain.   Gastrointestinal: Negative for nausea.   Genitourinary: Positive for decreased urine volume. Negative for dysuria.   Musculoskeletal: Positive for back pain.   Skin: Negative for rash.   Neurological: Negative for weakness.   Hematological: Does not bruise/bleed easily.       Physical Exam     Initial Vitals [04/15/19 1855]   BP Pulse Resp Temp SpO2   127/80 69 18 98.2 °F (36.8 °C) 95 %      MAP       --         Physical Exam    Nursing note and vitals reviewed.  Constitutional: He appears well-developed and well-nourished. No distress.   HENT:   Head: Normocephalic and atraumatic.   Mouth/Throat: Oropharynx is clear and moist.   Eyes: Conjunctivae and EOM are normal. Pupils are equal, round, and reactive to light.   Neck: Normal range of motion. Neck supple.   Cardiovascular: Normal rate, regular rhythm and normal heart sounds.   Pulmonary/Chest: Breath sounds normal. No respiratory distress.   Crackles to bilateral bases   Abdominal: Soft. Bowel sounds are normal. There is no tenderness.   Musculoskeletal: Normal range of motion. He exhibits edema (2+ edema to bilateral lower extremities).   Neurological: He is alert and oriented to person, place, and time.   Skin: Skin is warm and dry.         ED Course   Procedures  Labs Reviewed   CBC W/ AUTO DIFFERENTIAL - Abnormal; Notable for the following components:       Result Value    RBC 3.64 (*)      Hemoglobin 9.5 (*)     Hematocrit 31.1 (*)     MCH 26.1 (*)     MCHC 30.5 (*)     RDW 15.6 (*)     Mono # 1.1 (*)     Mono% 18.1 (*)     All other components within normal limits   COMPREHENSIVE METABOLIC PANEL - Abnormal; Notable for the following components:    BUN, Bld 27 (*)     Total Protein 8.6 (*)     Total Bilirubin 2.3 (*)     Alkaline Phosphatase 225 (*)     All other components within normal limits   TROPONIN I - Abnormal; Notable for the following components:    Troponin I 0.065 (*)     All other components within normal limits   B-TYPE NATRIURETIC PEPTIDE - Abnormal; Notable for the following components:     (*)     All other components within normal limits   PROTIME-INR - Abnormal; Notable for the following components:    Prothrombin Time 17.6 (*)     INR 1.8 (*)     All other components within normal limits        ECG Results          EKG 12-lead (In process)  Result time 04/15/19 22:19:06    In process by Interface, Lab In [facility] (04/15/19 22:19:06)                 Narrative:    Test Reason : R60.9,    Vent. Rate : 091 BPM     Atrial Rate : 091 BPM     P-R Int : 120 ms          QRS Dur : 118 ms      QT Int : 392 ms       P-R-T Axes : 000 -52 170 degrees     QTc Int : 482 ms    Sinus rhythm with Premature supraventricular complexes and with occasional   Premature ventricular complexes  Left axis deviation  Low voltage QRS  Incomplete right bundle branch block  ST and T wave abnormality, consider inferior ischemia  ST and T wave abnormality, consider anterolateral ischemia  Prolonged QT  Abnormal ECG  When compared with ECG of 31-JAN-2019 10:24,  Sinus rhythm has replaced Atrial fibrillation  Incomplete right bundle branch block has replaced Nonspecific  intraventricular block    Referred By: [email or web address]   SELF           Confirmed By:                             Imaging Results          X-Ray Chest AP Portable (Final result)  Result time 04/15/19 23:49:40    Final result by Kelvin Partida,  MD (04/15/19 23:49:40)                 Impression:      CHF.      Electronically signed by: Kelvin Partida MD  Date:    04/15/2019  Time:    23:49             Narrative:    EXAMINATION:  XR CHEST AP PORTABLE    CLINICAL HISTORY:  CHF;    TECHNIQUE:  Single frontal view of the chest was performed.    COMPARISON:  January 31, 2019.    FINDINGS:  Cardiomegaly with central vascular congestion and bilateral edema.    Small right effusion, similar to prior.    Heart and lungs  appear unchanged when allowing for differences in technique and positioning.                              X-Rays:   Independently Interpreted Readings:   Chest X-Ray: Cardiomegaly, pleural effusion on left and pulmonary edema.      Medical Decision Making:   History:   Old Medical Records: I decided to obtain old medical records.  Old Records Summarized: records from clinic visits.       <> Summary of Records: Records summarized in HPI  Independently Interpreted Test(s):   I have ordered and independently interpreted X-rays - see prior notes.  I have ordered and independently interpreted EKG Reading(s) - see prior notes  Clinical Tests:   Lab Tests: Ordered and Reviewed  Radiological Study: Ordered and Reviewed  Medical Tests: Ordered and Reviewed  ED Management:  Emergent evaluation of shortness of breath. Initial concerns for CHF exacerbation, medication noncompliance, ACS.   Patient has signs of volume overload. Will give IV lasix. Will check labs, chest xray. Anticipate admission.  Other:   I have discussed this case with another health care provider.            Scribe Attestation:   Scribe #1: I performed the above scribed service and the documentation accurately describes the services I performed. I attest to the accuracy of the note.    Attending Attestation:             Attending ED Notes:   Lab work shows chronically elevated troponin, BMP slightly above baseline, chest xray worse from prior. Patient has failed outpatient management. He is  not responding significantly to IV lasix, he only diuresis about 200 cc. Discussed with cardio and consistent with admission. Will admit to medicine for further management.              Clinical Impression:       ICD-10-CM ICD-9-CM   1. Edema R60.9 782.3   2. Shortness of breath R06.02 786.05   3. Acute on chronic combined systolic (congestive) and diastolic (congestive) heart failure I50.43 428.43     428.0         Disposition:   Disposition: Admitted  Condition: Fair                        Hilari Jackelyn Brooks MD  04/16/19 [id number]

## 2019-04-16 NOTE — H&P
[facility] Hospital Medicine  History & Physical    Patient Name: Hamlet Terrell  MRN: [id number]  Admission Date: 4/15/2019  Attending Physician: VIKY Tse MD   Primary Care Provider: Carlin Swift MD    [facility] Medicine Team: [facility] F Renato Muñoz PA-C     Patient information was obtained from patient, past medical records and ER records.     Subjective:     Principal Problem:Acute on chronic systolic congestive heart failure    Chief Complaint:   Chief Complaint   Patient presents with    Edema     hx chf, inc lasix 40mg  2 in morning and 2 in evening 3 weeks ago and no better    Congestive Heart Failure        HPI: Patient is 52 yo male with a PMHx of HTN, chronic systolic and diastolic heart failure, CVA, CAD, and MI being admitted to observation for acute CHF exacerbation. Patient reports chronic leg swelling that has worsened over the last 3 weeks. He denies shortness of breath, but endorses cough, orthopnea, and a 30lb weight gain. He states that his Lasix was increased to 40mg twice a day weeks ago and he has seen no improvement in his symptoms. He denies dietary indiscretions, but has not been adherent to a fluid restricted diet. He denies fevers, chills, chest pain, abdominal distension. He has chronic low back pain.    In the ED, vitals stable on room air. CXR with edema. Intake labs remarkable for , troponin 0.065, alkphos 225, tbili 2.3. He was given lasix 80 IVP and admitted to observation for further management.    Past Medical History:   Diagnosis Date    Anticoagulant long-term use     Cardiomegaly     CHF (congestive heart failure)     Chronic combined systolic and diastolic congestive heart failure     Coronary artery disease     CVA (cerebrovascular accident)     Heart attack 2006    Hypertension     Hyperthyroidism, subclinical 1/2/2013    MI (myocardial infarction) 9/22/2013    MI in 2009      Paroxysmal atrial fibrillation     PE  (pulmonary embolism) 1/1/2013    IN 2010     S/P ablation of atrial flutter 2008    Stroke 2009    no residual weaknesses       Past Surgical History:   Procedure Laterality Date    RADIOFREQUENCY ABLATION  01/08/2008    for atrial flutter       Review of patient's allergies indicates:   Allergen Reactions    Acetaminophen      Itching    Oxycodone-acetaminophen      Other reaction(s): Itching    Ace inhibitors Other (See Comments)     cough       No current facility-administered medications on file prior to encounter.      Current Outpatient Medications on File Prior to Encounter   Medication Sig    warfarin (COUMADIN) 7.5 MG tablet Take 1.5 tablets (11.25 mg total) by mouth Daily. (Patient taking differently: Take 12 mg by mouth Daily. Total 12 mg daily)    [DISCONTINUED] isosorbide dinitrate (ISORDIL) 20 MG tablet Take 1 tablet (20 mg total) by mouth 3 (three) times daily. (Patient taking differently: Take 20 mg by mouth 2 (two) times daily. )    [DISCONTINUED] torsemide (DEMADEX) 20 MG Tab Take 2 tablets (40 mg total) by mouth 2 (two) times daily.    albuterol (PROVENTIL/VENTOLIN HFA) 90 mcg/actuation inhaler Inhale 1-2 puffs into the lungs every 6 (six) hours as needed for Wheezing. Rescue    aspirin (ECOTRIN) 81 MG EC tablet Take 81 mg by mouth once daily.    nitroGLYCERIN (NITROSTAT) 0.4 MG SL tablet Place 1 tablet (0.4 mg total) under the tongue every 5 (five) minutes as needed for Chest pain. Tablet, Sublingual Sublingual    [DISCONTINUED] carvedilol (COREG) 25 MG tablet Take 25 mg by mouth 2 (two) times daily.    [DISCONTINUED] gabapentin (NEURONTIN) 400 MG capsule Take 1 capsule (400 mg total) by mouth 2 (two) times daily. For chronic heel/foot pain    [DISCONTINUED] losartan (COZAAR) 25 MG tablet Take 0.5 tablets (12.5 mg total) by mouth once daily.     Family History     Problem Relation (Age of Onset)    Alcohol abuse Father    Hypertension Mother, Father, Sister, Brother    Stroke  Mother        Tobacco Use    Smoking status: Never Smoker    Smokeless tobacco: Never Used   Substance and Sexual Activity    Alcohol use: No    Drug use: No    Sexual activity: Never     Review of Systems   Constitutional: Positive for activity change. Negative for chills and fever.   HENT: Negative for trouble swallowing.    Eyes: Negative for photophobia and visual disturbance.   Respiratory: Positive for cough, shortness of breath and wheezing.    Cardiovascular: Positive for leg swelling. Negative for chest pain and palpitations.   Gastrointestinal: Negative for abdominal distention, abdominal pain, constipation, diarrhea, nausea and vomiting.   Genitourinary: Negative for difficulty urinating, dysuria and urgency.   Musculoskeletal: Positive for back pain and gait problem. Negative for neck stiffness.   Skin: Negative for rash and wound.   Neurological: Negative for dizziness, speech difficulty, weakness and light-headedness.   Psychiatric/Behavioral: Negative for agitation and confusion. The patient is not nervous/anxious.      Objective:     Vital Signs (Most Recent):  Temp: 98.2 °F (36.8 °C) (04/15/19 1855)  Pulse: 84 (04/16/19 0022)  Resp: 18 (04/15/19 1855)  BP: 133/69 (04/16/19 0022)  SpO2: 98 % (04/16/19 0022) Vital Signs (24h Range):  Temp:  [98.2 °F (36.8 °C)] 98.2 °F (36.8 °C)  Pulse:  [69-85] 84  Resp:  [18] 18  SpO2:  [94 %-100 %] 98 %  BP: (127-150)/(63-93) 133/69     Weight: 123.1 kg (271 lb 4.4 oz)  Body mass index is 40.06 kg/m².    Physical Exam   Constitutional: He is oriented to person, place, and time. He appears well-developed and well-nourished. No distress.   HENT:   Head: Normocephalic and atraumatic.   Mouth/Throat: No oropharyngeal exudate.   Eyes: Pupils are equal, round, and reactive to light. Conjunctivae and EOM are normal.   Neck: Normal range of motion. Neck supple.   Cardiovascular: Normal rate, regular rhythm, normal heart sounds and intact distal pulses.    Pulmonary/Chest: Effort normal. No respiratory distress.   Quiet breath sounds   Abdominal: Soft. Bowel sounds are normal. He exhibits no distension. There is no tenderness.   Musculoskeletal: Normal range of motion. He exhibits edema. He exhibits no tenderness.   2+ BLE to knees, dry, cracked skin around ankles   Lymphadenopathy:     He has no cervical adenopathy.   Neurological: He is alert and oriented to person, place, and time. No cranial nerve deficit or sensory deficit. Coordination normal.   Skin: Skin is warm and dry. Capillary refill takes less than 2 seconds. No rash noted.   Psychiatric: He has a normal mood and affect. His behavior is normal. Judgment and thought content normal.   Nursing note and vitals reviewed.        CRANIAL NERVES     CN III, IV, VI   Pupils are equal, round, and reactive to light.  Extraocular motions are normal.        Significant Labs:   BMP:   Recent Labs   Lab 04/15/19  2305   GLU 78      K 3.6      CO2 29   BUN 27*   CREATININE 1.3   CALCIUM 9.9     CBC:   Recent Labs   Lab 04/15/19  2305   WBC 5.91   HGB 9.5*   HCT 31.1*        Troponin:   Recent Labs   Lab 04/15/19  2305   TROPONINI 0.065*       Significant Imaging: I have reviewed all pertinent imaging results/findings within the past 24 hours.    Assessment/Plan:     * Acute on chronic systolic congestive heart failure  Patient presents with worsening LE and orthopnea for 3 weeks. His home Lasix 40mg BID has not helped him. He reports dry weight of 240lbs, currently 271. Given IVP 80mg in the ED with ~ 1L output.  - start Lasix 80 IV TID  - metolazone 2.5mg before  - strict Is/Os, daily weights  - cardiac diet, 1.5L fluid restriction  - daily BMP  - telemetry  - TTE pending    Chronic kidney disease  Baseline Cr ~1.3  - daily BMP  - strict Is/Os    Coronary artery disease  History of CVA    - continue daily ASA  - not on statin, check lipid panel  - troponin 0.065, will trend for 3    Essential  hypertension  - not on HTN meds  - may benefit from starting ARB if tolerated    Atrial fibrillation, chronic  Chronic anticoagulation    - EKG with sinus rythym  - not on BB  - continue coumadin, consult PharmD  - INR 1.8 on admission  - daily INR      VTE Risk Mitigation (From admission, onward)        Ordered     Place CAMI hose  Until discontinued      04/16/19 0124     Place sequential compression device  Until discontinued      04/16/19 0124     IP VTE HIGH RISK PATIENT  Once      04/16/19 0124             Renato Muñoz PA-C  Department of Hospital Medicine   [facility]

## 2019-04-16 NOTE — PLAN OF CARE
Problem: Adult Inpatient Plan of Care  Goal: Plan of Care Review  Patient resting in bed. Teds and SCDs on. Educated on the importance of compliance with fluid intake

## 2019-04-16 NOTE — NURSING
Report rec'd from DIANA Loyola. No questions or concerns at this time. Nadn; will await pt's arrival to floor.

## 2019-04-16 NOTE — PLAN OF CARE
04/16/19 1120   Discharge Assessment   Assessment Type Discharge Planning Assessment   Confirmed/corrected address and phone number on facesheet? Yes   Assessment information obtained from? Patient   Expected Length of Stay (days) 2   Communicated expected length of stay with patient/caregiver yes   Prior to hospitilization cognitive status: Alert/Oriented   Prior to hospitalization functional status: Independent   Current cognitive status: Alert/Oriented   Current Functional Status: Independent  (pt having difficulty ambulating due to BLE edema)   Lives With alone   Able to Return to Prior Arrangements yes   Is patient able to care for self after discharge? Unable to determine at this time (comments)   Patient's perception of discharge disposition home or selfcare   Readmission Within the Last 30 Days no previous admission in last 30 days   Patient currently being followed by outpatient case management? No   Patient currently receives any other outside agency services? No   Equipment Currently Used at Home other (see comments)  (BP machine)   Do you have any problems affording any of your prescribed medications? No   Is the patient taking medications as prescribed? yes   Does the patient have transportation home? Yes   Transportation Anticipated family or friend will provide   Does the patient receive services at the Coumadin Clinic? No   Discharge Plan A Home   Discharge Plan B Home Health   Patient/Family in Agreement with Plan yes     Dx: CHF  Pharm[facility]  Hosp f/u appt: Patient to report to Dr. Carlin Swift's (PCP) walk-in clinic 1-2 weeks following discharge for a hospital follow up appointment.

## 2019-04-16 NOTE — HOSPITAL COURSE
Mr. Terrell was admitted to observation for CHF exacerbation. TTE with EF 23%, diastolic dysfunction, severe biatrial enlargement, pulmonary hypertension, abnormal septal wall motion. Pharmacy consulted for coumadin, INR subtherapeutic. Increased warfarin to 12 mg daily, patient was taking 6 mg BID, educated patient on correct use. Troponin trended flat. Patient complaining of lumbar back pain, CT lumbar spine with mild lumbar spondylosis L5-S1 with mild posterior disc bulge resulting in mild neural foraminal narrowing. Incidental finding of indeterminate hypodense lesion in the right kidney. Retroperitoneal ultrasound poorly visualized the lesion, will need continued monitored survelience. Cardiology consulted for evaluation of life vest vs. AICD for patient given runs of vtach and poor EF. Patient refused AICD/ life vest. Cr 2-->2.3, lasix held --> 2.6--> 2. INR supratherapeutic, warfarin held. Patient with epistaxis, resolved. H/H stable. INR therapeutic, restarted warfarin. Patient educated on switched from coumadin to DOAC; patient refused. Educated that he need to have INr checked regularly. Patient educated on appropriate follow up and medication compliance. Discharged in good condition. Patient did ask for a pain medication prescription on discharge, which he does not have an indication for.

## 2019-04-16 NOTE — CONSULTS
PHARMACY CONSULT NOTE    1) Admission Medication Reconciliation: Pending    2) Warfarin Dosing      Hamlet Terrell is a 53 y.o. male on warfarin therapy for Atrial Fibrillation. PharmD has been consulted for warfarin dosing.    Current order: None  Home dose: Patient previously followed with Dr. Kerrie Swift. Called Dr. Swift's clinic, patient has not followed up with the clinic since October 2018. At the time INR was 1.2 and patient was instructed to take 15mg (6mg x 2.5 tablets) daily. Patient reports taking 6mg twice daily and was adamant that this was the dose prescribed. No record of twice daily dosing at the clinic or patient's [facility]. Patient reports compliance with medication.   Coumadin clinic enrollment: Not active. Can offer patient to follow in coumadin clinic   INR goal: 2-3    Lab Results   Component Value Date    INR 1.8 (H) 04/16/2019    INR 1.8 (H) 04/15/2019    INR 1.6 (H) 01/31/2019     Significant drug interactions: None   Diet: Adult Cardiac    Recommendation(s):    Recommend warfarin 12mg by mouth daily. It is difficult to assess if INRs are subtherapeutic due to noncompliance or low dose. Would recommend starting with 12mg daily and if INR remains subtherapeutic may require increased dose.    Patient requires outpatient monitoring. Patient requires education on once daily dosing of warfarin (tried to educate patient and he got upset). Please contact pharmacy if assistance is required to enroll patient in coumadin clinic.     Pharmacy will continue to follow and monitor warfarin    Thank you for the consult,  Sofi Guerra, PharmD, BCPS, Internal Medicine Clinical Pharmacy Specialist  EXT [phone]      **Note: Consults are reviewed Monday-Friday 7:00am-3:30pm. THE ABOVE RECOMMENDATIONS ARE ONLY SUGGESTED.THE RECOMMENDATIONS SHOULD BE CONSIDERED IN CONJUNCTION WITH ALL PATIENT FACTORS. **

## 2019-04-16 NOTE — NURSING
Assumed care of pt. He is awake and alert with unlabored respirations. Has 4+ edema to lower extremities. Medicated with toradol for c/o back pain. Had 7 beat asymptomatic run of v.tach; chemistries were just drawn by phlebotomist; will continue to assess.

## 2019-04-16 NOTE — ASSESSMENT & PLAN NOTE
Chronic anticoagulation    - EKG with sinus rythym  - not on BB  - continue coumadin, consult PharmD  - INR 1.8 on admission  - daily INR

## 2019-04-16 NOTE — ASSESSMENT & PLAN NOTE
History of CVA    - continue daily ASA  - not on statin, check lipid panel  - troponin 0.065, will trend for 3

## 2019-04-16 NOTE — SUBJECTIVE & OBJECTIVE
Past Medical History:   Diagnosis Date    Anticoagulant long-term use     Cardiomegaly     CHF (congestive heart failure)     Chronic combined systolic and diastolic congestive heart failure     Coronary artery disease     CVA (cerebrovascular accident)     Heart attack 2006    Hypertension     Hyperthyroidism, subclinical 1/2/2013    MI (myocardial infarction) 9/22/2013    MI in 2009      Paroxysmal atrial fibrillation     PE (pulmonary embolism) 1/1/2013    IN 2010     S/P ablation of atrial flutter 2008    Stroke 2009    no residual weaknesses       Past Surgical History:   Procedure Laterality Date    RADIOFREQUENCY ABLATION  01/08/2008    for atrial flutter       Review of patient's allergies indicates:   Allergen Reactions    Acetaminophen      Itching    Oxycodone-acetaminophen      Other reaction(s): Itching    Ace inhibitors Other (See Comments)     cough       No current facility-administered medications on file prior to encounter.      Current Outpatient Medications on File Prior to Encounter   Medication Sig    warfarin (COUMADIN) 7.5 MG tablet Take 1.5 tablets (11.25 mg total) by mouth Daily. (Patient taking differently: Take 12 mg by mouth Daily. Total 12 mg daily)    [DISCONTINUED] isosorbide dinitrate (ISORDIL) 20 MG tablet Take 1 tablet (20 mg total) by mouth 3 (three) times daily. (Patient taking differently: Take 20 mg by mouth 2 (two) times daily. )    [DISCONTINUED] torsemide (DEMADEX) 20 MG Tab Take 2 tablets (40 mg total) by mouth 2 (two) times daily.    albuterol (PROVENTIL/VENTOLIN HFA) 90 mcg/actuation inhaler Inhale 1-2 puffs into the lungs every 6 (six) hours as needed for Wheezing. Rescue    aspirin (ECOTRIN) 81 MG EC tablet Take 81 mg by mouth once daily.    nitroGLYCERIN (NITROSTAT) 0.4 MG SL tablet Place 1 tablet (0.4 mg total) under the tongue every 5 (five) minutes as needed for Chest pain. Tablet, Sublingual Sublingual    [DISCONTINUED] carvedilol (COREG)  25 MG tablet Take 25 mg by mouth 2 (two) times daily.    [DISCONTINUED] gabapentin (NEURONTIN) 400 MG capsule Take 1 capsule (400 mg total) by mouth 2 (two) times daily. For chronic heel/foot pain    [DISCONTINUED] losartan (COZAAR) 25 MG tablet Take 0.5 tablets (12.5 mg total) by mouth once daily.     Family History     Problem Relation (Age of Onset)    Alcohol abuse Father    Hypertension Mother, Father, Sister, Brother    Stroke Mother        Tobacco Use    Smoking status: Never Smoker    Smokeless tobacco: Never Used   Substance and Sexual Activity    Alcohol use: No    Drug use: No    Sexual activity: Never     Review of Systems   Constitutional: Positive for activity change. Negative for chills and fever.   HENT: Negative for trouble swallowing.    Eyes: Negative for photophobia and visual disturbance.   Respiratory: Positive for cough, shortness of breath and wheezing.    Cardiovascular: Positive for leg swelling. Negative for chest pain and palpitations.   Gastrointestinal: Negative for abdominal distention, abdominal pain, constipation, diarrhea, nausea and vomiting.   Genitourinary: Negative for difficulty urinating, dysuria and urgency.   Musculoskeletal: Positive for back pain and gait problem. Negative for neck stiffness.   Skin: Negative for rash and wound.   Neurological: Negative for dizziness, speech difficulty, weakness and light-headedness.   Psychiatric/Behavioral: Negative for agitation and confusion. The patient is not nervous/anxious.      Objective:     Vital Signs (Most Recent):  Temp: 98.2 °F (36.8 °C) (04/15/19 1855)  Pulse: 84 (04/16/19 0022)  Resp: 18 (04/15/19 1855)  BP: 133/69 (04/16/19 0022)  SpO2: 98 % (04/16/19 0022) Vital Signs (24h Range):  Temp:  [98.2 °F (36.8 °C)] 98.2 °F (36.8 °C)  Pulse:  [69-85] 84  Resp:  [18] 18  SpO2:  [94 %-100 %] 98 %  BP: (127-150)/(63-93) 133/69     Weight: 123.1 kg (271 lb 4.4 oz)  Body mass index is 40.06 kg/m².    Physical Exam    Constitutional: He is oriented to person, place, and time. He appears well-developed and well-nourished. No distress.   HENT:   Head: Normocephalic and atraumatic.   Mouth/Throat: No oropharyngeal exudate.   Eyes: Pupils are equal, round, and reactive to light. Conjunctivae and EOM are normal.   Neck: Normal range of motion. Neck supple.   Cardiovascular: Normal rate, regular rhythm, normal heart sounds and intact distal pulses.   Pulmonary/Chest: Effort normal. No respiratory distress.   Quiet breath sounds   Abdominal: Soft. Bowel sounds are normal. He exhibits no distension. There is no tenderness.   Musculoskeletal: Normal range of motion. He exhibits edema. He exhibits no tenderness.   2+ BLE to knees, dry, cracked skin around ankles   Lymphadenopathy:     He has no cervical adenopathy.   Neurological: He is alert and oriented to person, place, and time. No cranial nerve deficit or sensory deficit. Coordination normal.   Skin: Skin is warm and dry. Capillary refill takes less than 2 seconds. No rash noted.   Psychiatric: He has a normal mood and affect. His behavior is normal. Judgment and thought content normal.   Nursing note and vitals reviewed.        CRANIAL NERVES     CN III, IV, VI   Pupils are equal, round, and reactive to light.  Extraocular motions are normal.        Significant Labs:   BMP:   Recent Labs   Lab 04/15/19  2305   GLU 78      K 3.6      CO2 29   BUN 27*   CREATININE 1.3   CALCIUM 9.9     CBC:   Recent Labs   Lab 04/15/19  2305   WBC 5.91   HGB 9.5*   HCT 31.1*        Troponin:   Recent Labs   Lab 04/15/19  2305   TROPONINI 0.065*       Significant Imaging: I have reviewed all pertinent imaging results/findings within the past 24 hours.

## 2019-04-16 NOTE — HPI
Patient is 52 yo male with a PMHx of HTN, chronic systolic and diastolic heart failure, CVA, CAD, and MI being admitted to observation for acute CHF exacerbation. Patient reports chronic leg swelling that has worsened over the last 3 weeks. He denies shortness of breath, but endorses cough, orthopnea, and a 30lb weight gain. He states that his Lasix was increased to 40mg twice a day weeks ago and he has seen no improvement in his symptoms. He denies dietary indiscretions, but has not been adherent to a fluid restricted diet. He denies fevers, chills, chest pain, abdominal distension. He has chronic low back pain.    In the ED, vitals stable on room air. CXR with edema. Intake labs remarkable for , troponin 0.065, alkphos 225, tbili 2.3. He was given lasix 80 IVP and admitted to observation for further management.

## 2019-04-16 NOTE — NURSING
Rec'd pt to unit via stretcher. Pt ambulated to bed. Heart monitor on. Pt is aox4. Respirations even and slightly labored. Pt denies cp, sob, or nausea at this time. Pt reports lower back pain and leg pain. Lungs clear with slight course wheezes noted to left lower fields but otherwise no issues. Abdomen soft, non-tender, and non-distended. Pt unsure of last bm. Lower extremities noted to have 4+ pitting edema. Skin warm, dry, and intact. No skin issues noted. Nadn; will continue to monitor.

## 2019-04-16 NOTE — ED TRIAGE NOTES
Hamlet Terrell, a 53 y.o. male presents to the ED w/ complaint of SOB and LE swelling. Pt increased lasix dose 3 weeks ago. Denies improvement in symptoms. Also c/o lower back pain.     Triage note:  Chief Complaint   Patient presents with    Edema     hx chf, inc lasix 40mg  2 in morning and 2 in evening 3 weeks ago and no better    Congestive Heart Failure     Review of patient's allergies indicates:   Allergen Reactions    Acetaminophen      Itching    Oxycodone-acetaminophen      Other reaction(s): Itching    Ace inhibitors Other (See Comments)     cough     Past Medical History:   Diagnosis Date    Anticoagulant long-term use     Cardiomegaly     CHF (congestive heart failure)     Chronic combined systolic and diastolic congestive heart failure     Coronary artery disease     CVA (cerebrovascular accident)     Heart attack 2006    Hypertension     Hyperthyroidism, subclinical 1/2/2013    MI (myocardial infarction) 9/22/2013    MI in 2009      Paroxysmal atrial fibrillation     PE (pulmonary embolism) 1/1/2013    IN 2010     S/P ablation of atrial flutter 2008    Stroke 2009    no residual weaknesses

## 2019-04-16 NOTE — ASSESSMENT & PLAN NOTE
Patient presents with worsening LE and orthopnea for 3 weeks. His home Lasix 40mg BID has not helped him. He reports dry weight of 240lbs, currently 271. Given IVP 80mg in the ED with ~ 1L output.  - start Lasix 80 IV TID  - metolazone 2.5mg before  - strict Is/Os, daily weights  - cardiac diet, 1.5L fluid restriction  - daily BMP  - telemetry  - TTE pending

## 2019-04-17 LAB
ALBUMIN SERPL BCP-MCNC: 3.4 G/DL (ref 3.5–5.2)
ALP SERPL-CCNC: 191 U/L (ref 55–135)
ALT SERPL W/O P-5'-P-CCNC: 8 U/L (ref 10–44)
ANION GAP SERPL CALC-SCNC: 12 MMOL/L (ref 8–16)
AST SERPL-CCNC: 15 U/L (ref 10–40)
BASOPHILS # BLD AUTO: 0.04 K/UL (ref 0–0.2)
BASOPHILS NFR BLD: 0.7 % (ref 0–1.9)
BILIRUB SERPL-MCNC: 1.5 MG/DL (ref 0.1–1)
BUN SERPL-MCNC: 35 MG/DL (ref 6–20)
CALCIUM SERPL-MCNC: 9.1 MG/DL (ref 8.7–10.5)
CHLORIDE SERPL-SCNC: 99 MMOL/L (ref 95–110)
CO2 SERPL-SCNC: 29 MMOL/L (ref 23–29)
CREAT SERPL-MCNC: 2 MG/DL (ref 0.5–1.4)
DIFFERENTIAL METHOD: ABNORMAL
EOSINOPHIL # BLD AUTO: 0.3 K/UL (ref 0–0.5)
EOSINOPHIL NFR BLD: 5.1 % (ref 0–8)
ERYTHROCYTE [DISTWIDTH] IN BLOOD BY AUTOMATED COUNT: 15.7 % (ref 11.5–14.5)
EST. GFR  (AFRICAN AMERICAN): 43.1 ML/MIN/1.73 M^2
EST. GFR  (NON AFRICAN AMERICAN): 37.3 ML/MIN/1.73 M^2
GLUCOSE SERPL-MCNC: 74 MG/DL (ref 70–110)
HCT VFR BLD AUTO: 29.9 % (ref 40–54)
HGB BLD-MCNC: 8.9 G/DL (ref 14–18)
IMM GRANULOCYTES # BLD AUTO: 0.01 K/UL (ref 0–0.04)
IMM GRANULOCYTES NFR BLD AUTO: 0.2 % (ref 0–0.5)
INR PPP: 1.9 (ref 0.8–1.2)
LYMPHOCYTES # BLD AUTO: 0.9 K/UL (ref 1–4.8)
LYMPHOCYTES NFR BLD: 15.2 % (ref 18–48)
MAGNESIUM SERPL-MCNC: 2 MG/DL (ref 1.6–2.6)
MCH RBC QN AUTO: 26.4 PG (ref 27–31)
MCHC RBC AUTO-ENTMCNC: 29.8 G/DL (ref 32–36)
MCV RBC AUTO: 89 FL (ref 82–98)
MONOCYTES # BLD AUTO: 1.6 K/UL (ref 0.3–1)
MONOCYTES NFR BLD: 25.7 % (ref 4–15)
NEUTROPHILS # BLD AUTO: 3.2 K/UL (ref 1.8–7.7)
NEUTROPHILS NFR BLD: 53.1 % (ref 38–73)
NRBC BLD-RTO: 0 /100 WBC
PLATELET # BLD AUTO: 183 K/UL (ref 150–350)
PMV BLD AUTO: 10.2 FL (ref 9.2–12.9)
POTASSIUM SERPL-SCNC: 4.8 MMOL/L (ref 3.5–5.1)
PROT SERPL-MCNC: 8 G/DL (ref 6–8.4)
PROTHROMBIN TIME: 18.9 SEC (ref 9–12.5)
RBC # BLD AUTO: 3.37 M/UL (ref 4.6–6.2)
SODIUM SERPL-SCNC: 140 MMOL/L (ref 136–145)
WBC # BLD AUTO: 6.06 K/UL (ref 3.9–12.7)

## 2019-04-17 PROCEDURE — 80053 COMPREHEN METABOLIC PANEL: CPT

## 2019-04-17 PROCEDURE — 83735 ASSAY OF MAGNESIUM: CPT

## 2019-04-17 PROCEDURE — 85610 PROTHROMBIN TIME: CPT

## 2019-04-17 PROCEDURE — 36415 COLL VENOUS BLD VENIPUNCTURE: CPT

## 2019-04-17 PROCEDURE — 25500020 PHARM REV CODE 255: Performed by: INTERNAL MEDICINE

## 2019-04-17 PROCEDURE — 11000001 HC ACUTE MED/SURG PRIVATE ROOM

## 2019-04-17 PROCEDURE — G0378 HOSPITAL OBSERVATION PER HR: HCPCS

## 2019-04-17 PROCEDURE — 25000003 PHARM REV CODE 250: Performed by: PHYSICIAN ASSISTANT

## 2019-04-17 PROCEDURE — 85025 COMPLETE CBC W/AUTO DIFF WBC: CPT

## 2019-04-17 PROCEDURE — 99226 PR SUBSEQUENT OBSERVATION CARE,LEVEL III: ICD-10-PCS | Mod: ,,, | Performed by: PHYSICIAN ASSISTANT

## 2019-04-17 PROCEDURE — 99226 PR SUBSEQUENT OBSERVATION CARE,LEVEL III: CPT | Mod: ,,, | Performed by: PHYSICIAN ASSISTANT

## 2019-04-17 RX ORDER — AMMONIUM LACTATE 12 G/100G
LOTION TOPICAL 2 TIMES DAILY
Status: DISCONTINUED | OUTPATIENT
Start: 2019-04-17 | End: 2019-04-23 | Stop reason: HOSPADM

## 2019-04-17 RX ORDER — HYDROXYZINE PAMOATE 25 MG/1
25 CAPSULE ORAL EVERY 4 HOURS PRN
Status: DISCONTINUED | OUTPATIENT
Start: 2019-04-17 | End: 2019-04-17

## 2019-04-17 RX ORDER — DIPHENHYDRAMINE HCL 25 MG
50 CAPSULE ORAL EVERY 6 HOURS PRN
Status: DISCONTINUED | OUTPATIENT
Start: 2019-04-17 | End: 2019-04-23 | Stop reason: HOSPADM

## 2019-04-17 RX ORDER — FUROSEMIDE 10 MG/ML
40 INJECTION INTRAMUSCULAR; INTRAVENOUS 2 TIMES DAILY
Status: DISCONTINUED | OUTPATIENT
Start: 2019-04-17 | End: 2019-04-17

## 2019-04-17 RX ORDER — MAGNESIUM SULFATE HEPTAHYDRATE 40 MG/ML
2 INJECTION, SOLUTION INTRAVENOUS ONCE
Status: DISCONTINUED | OUTPATIENT
Start: 2019-04-17 | End: 2019-04-17

## 2019-04-17 RX ORDER — LIDOCAINE 50 MG/G
2 PATCH TOPICAL
Status: DISCONTINUED | OUTPATIENT
Start: 2019-04-17 | End: 2019-04-23 | Stop reason: HOSPADM

## 2019-04-17 RX ORDER — LANOLIN ALCOHOL/MO/W.PET/CERES
400 CREAM (GRAM) TOPICAL ONCE
Status: DISCONTINUED | OUTPATIENT
Start: 2019-04-17 | End: 2019-04-17

## 2019-04-17 RX ORDER — DIPHENHYDRAMINE HCL 25 MG
25 CAPSULE ORAL EVERY 4 HOURS PRN
Status: DISCONTINUED | OUTPATIENT
Start: 2019-04-17 | End: 2019-04-17

## 2019-04-17 RX ORDER — WARFARIN 6 MG/1
6 TABLET ORAL 2 TIMES DAILY
Status: ON HOLD | COMMUNITY
End: 2019-04-22 | Stop reason: HOSPADM

## 2019-04-17 RX ADMIN — OXYCODONE HYDROCHLORIDE 10 MG: 5 TABLET ORAL at 07:04

## 2019-04-17 RX ADMIN — TIZANIDINE 4 MG: 4 TABLET ORAL at 09:04

## 2019-04-17 RX ADMIN — TIZANIDINE 4 MG: 4 TABLET ORAL at 01:04

## 2019-04-17 RX ADMIN — OXYCODONE HYDROCHLORIDE 10 MG: 5 TABLET ORAL at 09:04

## 2019-04-17 RX ADMIN — DIPHENHYDRAMINE HYDROCHLORIDE 25 MG: 25 CAPSULE ORAL at 11:04

## 2019-04-17 RX ADMIN — IOHEXOL 100 ML: 350 INJECTION, SOLUTION INTRAVENOUS at 03:04

## 2019-04-17 RX ADMIN — WARFARIN SODIUM 12 MG: 5 TABLET ORAL at 07:04

## 2019-04-17 RX ADMIN — HYDROXYZINE PAMOATE 25 MG: 25 CAPSULE ORAL at 09:04

## 2019-04-17 RX ADMIN — METOLAZONE 2.5 MG: 2.5 TABLET ORAL at 09:04

## 2019-04-17 RX ADMIN — ASPIRIN 81 MG: 81 TABLET, COATED ORAL at 09:04

## 2019-04-17 NOTE — PLAN OF CARE
Problem: Pain Acute  Goal: Optimal Pain Control  Outcome: Ongoing (interventions implemented as appropriate)  Pt continues to complain of 10/10 pain. Pt medicated as ordered. Pt able to tolerate PO. Chew medications

## 2019-04-17 NOTE — PROGRESS NOTES
[facility] Hospital Medicine  Progress Note    Patient Name: Hamlet Terrell  MRN: [id number]  Patient Class: OP- Observation   Admission Date: 4/15/2019  Length of Stay: 0 days  Attending Physician: Joselyn Isabel MD  Primary Care Provider: Carlin Swift MD    [facility] Medicine Team: [facility] Hazel Noriega PA-C    Subjective:     Principal Problem:Acute on chronic systolic congestive heart failure    HPI:  Patient is 54 yo male with a PMHx of HTN, chronic systolic and diastolic heart failure, CVA, CAD, and MI being admitted to observation for acute CHF exacerbation. Patient reports chronic leg swelling that has worsened over the last 3 weeks. He denies shortness of breath, but endorses cough, orthopnea, and a 30lb weight gain. He states that his Lasix was increased to 40mg twice a day weeks ago and he has seen no improvement in his symptoms. He denies dietary indiscretions, but has not been adherent to a fluid restricted diet. He denies fevers, chills, chest pain, abdominal distension. He has chronic low back pain.    In the ED, vitals stable on room air. CXR with edema. Intake labs remarkable for , troponin 0.065, alkphos 225, tbili 2.3. He was given lasix 80 IVP and admitted to observation for further management.    Hospital Course:  Mr. Terrell was admitted to observation for CHF exacerbation. TTE with EF 23%, diastolic dysfunction, severe biatrial enlargement, pulmonary hypertension, abnormal septal wall motion. Pharmacy consulted for coumadin, INR subtherapeutic. Increased warfarin to 12 mg daily, patient was taking 6 mg BID, educated patient on correct use. Troponin trended flat. Patient complaining of lumbar back pain, CT lumbar spine pending.     Interval History: Patient complaining of acute onset of stabbing, sharp pain in RUQ, SOB which has not improved with diuresis, and itching.  Overnight, patient with symptomatic hypotension, will discontinue Lasix today and  restart tomorrow. Per nurse, patient with 2 episodes of 11 run vtach. Patient asymptomatic. Afib on tele. Low magnesium, replaced IV. Will start on losartan  tomorrow given hypotension today.  Wound care consulted for BLE evaluation. No weeping or wounds noted. Per wound care, compression wrapping is CI during acute phase of CHF. Recommend Lac-hydrin BID. Itching not relieved with vistaril, benadryl. Likely due to oxycodone, will discontinue.     Review of Systems   Constitutional: Negative for chills and fever.   HENT: Negative for congestion and rhinorrhea.    Respiratory: Positive for shortness of breath. Negative for cough, chest tightness and wheezing.    Cardiovascular: Positive for leg swelling. Negative for chest pain and palpitations.   Gastrointestinal: Positive for abdominal pain. Negative for constipation, diarrhea and vomiting.   Genitourinary: Negative for dysuria.   Musculoskeletal: Positive for back pain and gait problem.   Psychiatric/Behavioral: Negative for agitation and behavioral problems.     Objective:     Vital Signs (Most Recent):  Temp: 98.3 °F (36.8 °C) (04/17/19 1147)  Pulse: 64 (04/17/19 1505)  Resp: 18 (04/17/19 1147)  BP: 115/67 (04/17/19 1147)  SpO2: 98 % (04/17/19 1147) Vital Signs (24h Range):  Temp:  [96.3 °F (35.7 °C)-98.3 °F (36.8 °C)] 98.3 °F (36.8 °C)  Pulse:  [47-83] 64  Resp:  [14-18] 18  SpO2:  [88 %-100 %] 98 %  BP: ()/(57-72) 115/67     Weight: 122.9 kg (271 lb)  Body mass index is 40.02 kg/m².    Intake/Output Summary (Last 24 hours) at 4/17/2019 1518  Last data filed at 4/17/2019 0600  Gross per 24 hour   Intake 120 ml   Output 1375 ml   Net -1255 ml      Physical Exam   Constitutional: He appears well-developed and well-nourished. No distress.   HENT:   Head: Normocephalic and atraumatic.   Eyes: Pupils are equal, round, and reactive to light. EOM are normal.   Neck: Normal range of motion. Neck supple.   Cardiovascular: Normal rate and intact distal pulses. An  irregularly irregular rhythm present.   No murmur heard.  Pulmonary/Chest: Effort normal and breath sounds normal. He has no wheezes. He has no rales. He exhibits no tenderness.   Abdominal: Soft. Bowel sounds are normal. There is tenderness in the periumbilical area. There is no rebound, no CVA tenderness, no tenderness at McBurney's point and negative Pham's sign.   Skin: He is not diaphoretic.   Nursing note and vitals reviewed.      Significant Labs:   CBC:   Recent Labs   Lab 04/15/19  2305 04/16/19  0346 04/17/19  0433   WBC 5.91 6.75 6.06   HGB 9.5* 9.0* 8.9*   HCT 31.1* 30.1* 29.9*    218 183     CMP:   Recent Labs   Lab 04/15/19  2305 04/16/19  0346 04/16/19  1756    142 138   K 3.6 3.5 3.8    100 97   CO2 29 29 32*   GLU 78 103 112*   BUN 27* 26* 28*   CREATININE 1.3 1.4 1.5*   CALCIUM 9.9 9.5 10.0   PROT 8.6* 8.7*  --    ALBUMIN 3.7 3.8  --    BILITOT 2.3* 2.6*  --    ALKPHOS 225* 209*  --    AST 16 15  --    ALT 10 9*  --    ANIONGAP 10 13 9   EGFRNONAA >60.0 57.4* 52.8*     Magnesium:   Recent Labs   Lab 04/16/19 0346 04/16/19  1756 04/17/19  0758   MG 1.5* 1.4* 2.0     POCT Glucose: No results for input(s): POCTGLUCOSE in the last 48 hours.    Significant Imaging: I have reviewed all pertinent imaging results/findings within the past 24 hours.    Assessment/Plan:      * Acute on chronic systolic congestive heart failure  Patient presents with worsening LE and orthopnea for 3 weeks. His home Lasix 40mg BID has not helped him. He reports dry weight of 240lbs, currently 271. Given IVP 80mg in the ED with ~ 1L output.  - start Lasix 80 IV TID  - metolazone 2.5mg before  - patient with hypotensive episode overnight, will take Lasix holiday today and restart tomorrow at 80 mg daily  - output ~1700L  - strict Is/Os, daily weights  - cardiac diet, 1.5L fluid restriction  - daily BMP  - telemetry  - TTE with EF 23%, diastolic dysfunction, severe biatrial enlargement, pulmonary  hypertension, abnormal septal wall motion  - not currently on BB, ARB  - will start prior to discharge    Chronic kidney disease  Baseline Cr ~1.3  - daily BMP  - strict Is/Os  - Cr 1.5, will monitor    Coronary artery disease  History of CVA    - continue daily ASA  - not on statin,  lipid panel significant for low HDL  - troponin 0.065--> .077-->.075, flat    Lumbar back pain  Patient complaining of shooting lumbar pain X 1 week. Denies preceding injury  - CT lumbar spine pending    Essential hypertension  - not on HTN meds  - may benefit from starting ARB if tolerated  - will start on Losartan tomorrow    Atrial fibrillation, chronic  Chronic anticoagulation    - EKG with sinus rythym  - not on BB  - continue coumadin, consult PharmD  - INR 1.8 on admission --> 1.9  - patient taking 6 mg BID, rather than 12 mg daily. Educated patient on proper use, patient verbalized understanding  - daily INR  - Will start on BB prior to discharge      VTE Risk Mitigation (From admission, onward)        Ordered     warfarin tablet 12 mg  Daily      04/16/19 1602     Place sequential compression device  Until discontinued      04/16/19 0124     IP VTE HIGH RISK PATIENT  Once      04/16/19 0124              Hazel Noriega PA-C  Department of Hospital Medicine   [facility]

## 2019-04-17 NOTE — NURSING
Pt very lethargic and bp low. AYESHA Duran notified of changes in pt condition. Bp taken manually. Bp better at this time. Roger at bedside assessing pt. Provider notified of frequent pac's as well as runs of vtach. No direct complaints by patient at this time. No direct orders now. Will monitor pt closely for change in status.

## 2019-04-17 NOTE — PLAN OF CARE
Problem: Adult Inpatient Plan of Care  Goal: Plan of Care Review  Outcome: Ongoing (interventions implemented as appropriate)  Patient lethargic at beginning of shift. HR a fib with multiple PVCs and two 11 beat runs of Vtach. Symptomatic hypotension with emesis and dizziness.  Magnesium 1.4 and replaced. Anti-emetics given. Blood pressure improving. Will continue to monitor labs and vitals

## 2019-04-17 NOTE — ASSESSMENT & PLAN NOTE
Patient complaining of shooting lumbar pain X 1 week. Denies preceding injury  - CT lumbar spine pending

## 2019-04-17 NOTE — CONSULTS
Consult received on patient's BLE evaluation. No open wounds noted to BLE. No weeping or drainage noted. Dry tissue noted. Palpable pedal pulse noted. Moderate edema noted to BLE. Patient admitted for acute CHF. Compression contraindicated during acute phase of CHF. Patient would need ABIs prior to compression when patient compensated. Recommend applying lac-hydrin lotion BID to lower extremities. Discussed recommendations with Hazel HODGE. Telephone order received for lac-hydrin BID. Medicine team will plan on ABIs on outpatient basis. Nursing to continue care. Wound care to follow prn.

## 2019-04-17 NOTE — ASSESSMENT & PLAN NOTE
Patient presents with worsening LE and orthopnea for 3 weeks. His home Lasix 40mg BID has not helped him. He reports dry weight of 240lbs, currently 271. Given IVP 80mg in the ED with ~ 1L output.  - start Lasix 80 IV TID  - metolazone 2.5mg before  - patient with hypotensive episode overnight, will take Lasix holiday today and restart tomorrow at 80 mg daily  - output ~1700L  - strict Is/Os, daily weights  - cardiac diet, 1.5L fluid restriction  - daily BMP  - telemetry  - TTE with EF 23%, diastolic dysfunction, severe biatrial enlargement, pulmonary hypertension, abnormal septal wall motion  - not currently on BB, ARB  - will start prior to discharge

## 2019-04-17 NOTE — SUBJECTIVE & OBJECTIVE
Interval History: Patient complaining of acute onset of stabbing, sharp pain in RUQ, SOB which has not improved with diuresis, and itching.  Overnight, patient with symptomatic hypotension, will discontinue Lasix today and restart tomorrow. Per nurse, patient with 2 episodes of 11 run vtach. Patient asymptomatic. Afib on tele. Low magnesium, replaced IV. Will start on losartan  tomorrow given hypotension today.  Wound care consulted for BLE evaluation. No weeping or wounds noted. Per wound care, compression wrapping is CI during acute phase of CHF. Recommend Lac-hydrin BID. Itching not relieved with vistaril, benadryl. Likely due to oxycodone, will discontinue.     Review of Systems   Constitutional: Negative for chills and fever.   HENT: Negative for congestion and rhinorrhea.    Respiratory: Positive for shortness of breath. Negative for cough, chest tightness and wheezing.    Cardiovascular: Positive for leg swelling. Negative for chest pain and palpitations.   Gastrointestinal: Positive for abdominal pain. Negative for constipation, diarrhea and vomiting.   Genitourinary: Negative for dysuria.   Musculoskeletal: Positive for back pain and gait problem.   Psychiatric/Behavioral: Negative for agitation and behavioral problems.     Objective:     Vital Signs (Most Recent):  Temp: 98.3 °F (36.8 °C) (04/17/19 1147)  Pulse: 64 (04/17/19 1505)  Resp: 18 (04/17/19 1147)  BP: 115/67 (04/17/19 1147)  SpO2: 98 % (04/17/19 1147) Vital Signs (24h Range):  Temp:  [96.3 °F (35.7 °C)-98.3 °F (36.8 °C)] 98.3 °F (36.8 °C)  Pulse:  [47-83] 64  Resp:  [14-18] 18  SpO2:  [88 %-100 %] 98 %  BP: ()/(57-72) 115/67     Weight: 122.9 kg (271 lb)  Body mass index is 40.02 kg/m².    Intake/Output Summary (Last 24 hours) at 4/17/2019 1518  Last data filed at 4/17/2019 0600  Gross per 24 hour   Intake 120 ml   Output 1375 ml   Net -1255 ml      Physical Exam   Constitutional: He appears well-developed and well-nourished. No distress.    HENT:   Head: Normocephalic and atraumatic.   Eyes: Pupils are equal, round, and reactive to light. EOM are normal.   Neck: Normal range of motion. Neck supple.   Cardiovascular: Normal rate and intact distal pulses. An irregularly irregular rhythm present.   No murmur heard.  Pulmonary/Chest: Effort normal and breath sounds normal. He has no wheezes. He has no rales. He exhibits no tenderness.   Abdominal: Soft. Bowel sounds are normal. There is tenderness in the periumbilical area. There is no rebound, no CVA tenderness, no tenderness at McBurney's point and negative Pham's sign.   Skin: He is not diaphoretic.   Nursing note and vitals reviewed.      Significant Labs:   CBC:   Recent Labs   Lab 04/15/19  2305 04/16/19  0346 04/17/19  0433   WBC 5.91 6.75 6.06   HGB 9.5* 9.0* 8.9*   HCT 31.1* 30.1* 29.9*    218 183     CMP:   Recent Labs   Lab 04/15/19  2305 04/16/19  0346 04/16/19  1756    142 138   K 3.6 3.5 3.8    100 97   CO2 29 29 32*   GLU 78 103 112*   BUN 27* 26* 28*   CREATININE 1.3 1.4 1.5*   CALCIUM 9.9 9.5 10.0   PROT 8.6* 8.7*  --    ALBUMIN 3.7 3.8  --    BILITOT 2.3* 2.6*  --    ALKPHOS 225* 209*  --    AST 16 15  --    ALT 10 9*  --    ANIONGAP 10 13 9   EGFRNONAA >60.0 57.4* 52.8*     Magnesium:   Recent Labs   Lab 04/16/19 0346 04/16/19  1756 04/17/19  0758   MG 1.5* 1.4* 2.0     POCT Glucose: No results for input(s): POCTGLUCOSE in the last 48 hours.    Significant Imaging: I have reviewed all pertinent imaging results/findings within the past 24 hours.

## 2019-04-17 NOTE — ASSESSMENT & PLAN NOTE
Chronic anticoagulation    - EKG with sinus rythym  - not on BB  - continue coumadin, consult PharmD  - INR 1.8 on admission --> 1.9  - patient taking 6 mg BID, rather than 12 mg daily. Educated patient on proper use, patient verbalized understanding  - daily INR  - Will start on BB prior to discharge

## 2019-04-17 NOTE — NURSING
Notified provider of symptomatic hypotension 91/58 and frequent PVCs with runs of Vtach. Magnesium ordered

## 2019-04-17 NOTE — ASSESSMENT & PLAN NOTE
- not on HTN meds  - may benefit from starting ARB if tolerated  - will start on Losartan tomorrow

## 2019-04-17 NOTE — CONSULTS
PHARMACY CONSULT NOTE     1) Admission Medication Reconciliation: See MedRec note     2) Warfarin Dosing      Hamlet Terrell is a 53 y.o. male on warfarin therapy for Atrial Fibrillation. PharmD has been consulted for warfarin dosing.     Current order: None  Home dose: Patient previously followed with Dr. Kerrie Swift. Called Dr. Swift's clinic, patient has not followed up with the clinic since October 2018. At the time INR was 1.2 and patient was instructed to take 15mg (6mg x 2.5 tablets) daily. Patient reports taking 6mg twice daily and was adamant that this was the dose prescribed. No record of twice daily dosing at the clinic or [facility]'s [facility]. Patient reports compliance with medication.   Coumadin clinic enrollment: Not active. Can offer patient to follow in coumadin clinic   INR goal: 2-3     Lab Results   Component Value Date    INR 1.9 (H) 04/17/2019    INR 1.8 (H) 04/16/2019    INR 1.8 (H) 04/15/2019        Significant drug interactions: None   Diet: Adult Cardiac     Recommendation(s):   · Continue warfarin 12mg by mouth daily.  · Patient requires outpatient monitoring. Please contact pharmacy if assistance is required to enroll patient in coumadin clinic.    · Pharmacy will continue to follow and monitor warfarin     Thank you for the consult,  Sofi Guerra, PharmD, BCPS, Internal Medicine Clinical Pharmacy Specialist  [facility] [phone]        **Note: Consults are reviewed Monday-Friday 7:00am-3:30pm. THE ABOVE RECOMMENDATIONS ARE ONLY SUGGESTED.THE RECOMMENDATIONS SHOULD BE CONSIDERED IN CONJUNCTION WITH ALL PATIENT FACTORS. **

## 2019-04-17 NOTE — ASSESSMENT & PLAN NOTE
History of CVA    - continue daily ASA  - not on statin,  lipid panel significant for low HDL  - troponin 0.065--> .077-->.075, flat

## 2019-04-17 NOTE — PHARMACY MED REC
"Admission Medication Reconciliation - Pharmacy Consult Note    The home medication history was taken by Giselle Meier, Pharmacy Technician.  Based on information gathered and subsequent review by the clinical pharmacist, the items below may need attention.     You may go to "Admission" then "Reconcile Home Medications" tabs to review and/or act upon these items.     No issues with current MAR and MedRec.      Potential issues to be addressed PRIOR TO DISCHARGE  o Patient reports taking warfarin 6mg twice daily. Patient requires education to take warfarin once daily.   o Patient may require a new prescription for furosemide. It was prescribed as a 40mg tablet with a quantity of 60 tablets for a 30 day supply however patient reports taking 2 tablets twice daily.     Please address this information as you see fit.  Feel free to contact us if you have any questions or require assistance.    Sofi Guerra, PharmD, BCPS, Internal Medicine Clinical Pharmacy Specialist  [facility] [phone]                    .    .            "

## 2019-04-18 LAB
ALBUMIN SERPL BCP-MCNC: 3.4 G/DL (ref 3.5–5.2)
ALP SERPL-CCNC: 192 U/L (ref 55–135)
ALT SERPL W/O P-5'-P-CCNC: 8 U/L (ref 10–44)
ANION GAP SERPL CALC-SCNC: 12 MMOL/L (ref 8–16)
AST SERPL-CCNC: 14 U/L (ref 10–40)
BASOPHILS # BLD AUTO: 0.04 K/UL (ref 0–0.2)
BASOPHILS NFR BLD: 0.7 % (ref 0–1.9)
BILIRUB SERPL-MCNC: 1.5 MG/DL (ref 0.1–1)
BILIRUB UR QL STRIP: NEGATIVE
BUN SERPL-MCNC: 41 MG/DL (ref 6–20)
CALCIUM SERPL-MCNC: 9.1 MG/DL (ref 8.7–10.5)
CHLORIDE SERPL-SCNC: 96 MMOL/L (ref 95–110)
CHLORIDE UR-SCNC: <20 MMOL/L (ref 25–200)
CLARITY UR REFRACT.AUTO: CLEAR
CO2 SERPL-SCNC: 28 MMOL/L (ref 23–29)
COLOR UR AUTO: YELLOW
CREAT SERPL-MCNC: 2.3 MG/DL (ref 0.5–1.4)
CREAT UR-MCNC: 213 MG/DL (ref 23–375)
DIFFERENTIAL METHOD: ABNORMAL
EOSINOPHIL # BLD AUTO: 0.3 K/UL (ref 0–0.5)
EOSINOPHIL NFR BLD: 4.2 % (ref 0–8)
ERYTHROCYTE [DISTWIDTH] IN BLOOD BY AUTOMATED COUNT: 16 % (ref 11.5–14.5)
EST. GFR  (AFRICAN AMERICAN): 36.4 ML/MIN/1.73 M^2
EST. GFR  (NON AFRICAN AMERICAN): 31.5 ML/MIN/1.73 M^2
GLUCOSE SERPL-MCNC: 92 MG/DL (ref 70–110)
GLUCOSE UR QL STRIP: NEGATIVE
HCT VFR BLD AUTO: 27.7 % (ref 40–54)
HGB BLD-MCNC: 8.3 G/DL (ref 14–18)
HGB UR QL STRIP: NEGATIVE
IMM GRANULOCYTES # BLD AUTO: 0.02 K/UL (ref 0–0.04)
IMM GRANULOCYTES NFR BLD AUTO: 0.3 % (ref 0–0.5)
INR PPP: 2.3 (ref 0.8–1.2)
KETONES UR QL STRIP: NEGATIVE
LEUKOCYTE ESTERASE UR QL STRIP: NEGATIVE
LYMPHOCYTES # BLD AUTO: 1 K/UL (ref 1–4.8)
LYMPHOCYTES NFR BLD: 17.3 % (ref 18–48)
MAGNESIUM SERPL-MCNC: 1.9 MG/DL (ref 1.6–2.6)
MCH RBC QN AUTO: 26.4 PG (ref 27–31)
MCHC RBC AUTO-ENTMCNC: 30 G/DL (ref 32–36)
MCV RBC AUTO: 88 FL (ref 82–98)
MONOCYTES # BLD AUTO: 1.2 K/UL (ref 0.3–1)
MONOCYTES NFR BLD: 20 % (ref 4–15)
NEUTROPHILS # BLD AUTO: 3.4 K/UL (ref 1.8–7.7)
NEUTROPHILS NFR BLD: 57.5 % (ref 38–73)
NITRITE UR QL STRIP: NEGATIVE
NRBC BLD-RTO: 0 /100 WBC
OSMOLALITY SERPL: 298 MOSM/KG (ref 280–300)
OSMOLALITY UR: 373 MOSM/KG (ref 50–1200)
PH UR STRIP: 5 [PH] (ref 5–8)
PLATELET # BLD AUTO: 201 K/UL (ref 150–350)
PMV BLD AUTO: 10.6 FL (ref 9.2–12.9)
POTASSIUM SERPL-SCNC: 4.4 MMOL/L (ref 3.5–5.1)
POTASSIUM UR-SCNC: 60 MMOL/L (ref 15–95)
PROT SERPL-MCNC: 8 G/DL (ref 6–8.4)
PROT UR QL STRIP: NEGATIVE
PROTHROMBIN TIME: 22.5 SEC (ref 9–12.5)
RBC # BLD AUTO: 3.14 M/UL (ref 4.6–6.2)
SODIUM SERPL-SCNC: 136 MMOL/L (ref 136–145)
SODIUM UR-SCNC: <20 MMOL/L (ref 20–250)
SP GR UR STRIP: 1 (ref 1–1.03)
URN SPEC COLLECT METH UR: NORMAL
UUN UR-MCNC: 313 MG/DL (ref 140–1050)
WBC # BLD AUTO: 5.96 K/UL (ref 3.9–12.7)

## 2019-04-18 PROCEDURE — 83935 ASSAY OF URINE OSMOLALITY: CPT

## 2019-04-18 PROCEDURE — 81003 URINALYSIS AUTO W/O SCOPE: CPT

## 2019-04-18 PROCEDURE — 85025 COMPLETE CBC W/AUTO DIFF WBC: CPT

## 2019-04-18 PROCEDURE — 82570 ASSAY OF URINE CREATININE: CPT

## 2019-04-18 PROCEDURE — 83930 ASSAY OF BLOOD OSMOLALITY: CPT

## 2019-04-18 PROCEDURE — 36415 COLL VENOUS BLD VENIPUNCTURE: CPT

## 2019-04-18 PROCEDURE — 85610 PROTHROMBIN TIME: CPT

## 2019-04-18 PROCEDURE — 25000003 PHARM REV CODE 250: Performed by: PHYSICIAN ASSISTANT

## 2019-04-18 PROCEDURE — 99233 SBSQ HOSP IP/OBS HIGH 50: CPT | Mod: ,,, | Performed by: PHYSICIAN ASSISTANT

## 2019-04-18 PROCEDURE — 82436 ASSAY OF URINE CHLORIDE: CPT

## 2019-04-18 PROCEDURE — 99233 PR SUBSEQUENT HOSPITAL CARE,LEVL III: ICD-10-PCS | Mod: ,,, | Performed by: PHYSICIAN ASSISTANT

## 2019-04-18 PROCEDURE — 80053 COMPREHEN METABOLIC PANEL: CPT

## 2019-04-18 PROCEDURE — 84300 ASSAY OF URINE SODIUM: CPT

## 2019-04-18 PROCEDURE — 84133 ASSAY OF URINE POTASSIUM: CPT

## 2019-04-18 PROCEDURE — 83735 ASSAY OF MAGNESIUM: CPT

## 2019-04-18 PROCEDURE — 11000001 HC ACUTE MED/SURG PRIVATE ROOM

## 2019-04-18 PROCEDURE — 84540 ASSAY OF URINE/UREA-N: CPT

## 2019-04-18 RX ORDER — WARFARIN SODIUM 5 MG/1
10 TABLET ORAL DAILY
Status: DISCONTINUED | OUTPATIENT
Start: 2019-04-18 | End: 2019-04-19

## 2019-04-18 RX ADMIN — AMMONIUM LACTATE: 12 LOTION TOPICAL at 09:04

## 2019-04-18 RX ADMIN — TIZANIDINE 4 MG: 4 TABLET ORAL at 09:04

## 2019-04-18 RX ADMIN — ASPIRIN 81 MG: 81 TABLET, COATED ORAL at 09:04

## 2019-04-18 RX ADMIN — TIZANIDINE 4 MG: 4 TABLET ORAL at 07:04

## 2019-04-18 RX ADMIN — AMMONIUM LACTATE: 12 LOTION TOPICAL at 07:04

## 2019-04-18 RX ADMIN — WARFARIN SODIUM 10 MG: 5 TABLET ORAL at 08:04

## 2019-04-18 RX ADMIN — TIZANIDINE 4 MG: 4 TABLET ORAL at 02:04

## 2019-04-18 RX ADMIN — SODIUM CHLORIDE 500 ML: 0.9 INJECTION, SOLUTION INTRAVENOUS at 02:04

## 2019-04-18 RX ADMIN — DIPHENHYDRAMINE HYDROCHLORIDE 50 MG: 25 CAPSULE ORAL at 05:04

## 2019-04-18 NOTE — CONSULTS
PHARMACY CONSULT NOTE     1) Warfarin Dosing      Hamlet Terrell is a 53 y.o. male on warfarin therapy for Atrial Fibrillation. PharmD has been consulted for warfarin dosing.     Current order: None  Home dose: Patient previously followed with Dr. Kerrie Swift. Called Dr. Swift's clinic, patient has not followed up with the clinic since October 2018. At the time INR was 1.2 and patient was instructed to take 15mg (6mg x 2.5 tablets) daily. Patient reports taking 6mg twice daily and was adamant that this was the dose prescribed. No record of twice daily dosing at the clinic or [facility]'s [facility]. Patient reports compliance with medication.   Coumadin clinic enrollment: Not active. Can offer patient to follow in coumadin clinic   INR goal: 2-3     Lab Results   Component Value Date    INR 2.3 (H) 04/18/2019    INR 1.9 (H) 04/17/2019    INR 1.8 (H) 04/16/2019         Significant drug interactions: None   Diet: Adult Cardiac     Recommendation(s):   · INR priti quickly over 24 hours. If patient noncompliant at home may be seeing the effects of giving warfarin in a controlled setting. Slightly decrease warfarin to 10mg today and monitor INR trend.    · Please contact inpatient for questions with warfarin on the holiday Friday 04/19/2019 and over the weekend.  · Patient requires outpatient monitoring. Please contact pharmacy if assistance is required to enroll patient in coumadin clinic.       Thank you for the consult,  Sofi Guerra, PharmD, BCPS, Internal Medicine Clinical Pharmacy Specialist  [facility] [phone]        **Note: Consults are reviewed Monday-Friday 7:00am-3:30pm. THE ABOVE RECOMMENDATIONS ARE ONLY SUGGESTED.THE RECOMMENDATIONS SHOULD BE CONSIDERED IN CONJUNCTION WITH ALL PATIENT FACTORS. **

## 2019-04-18 NOTE — ASSESSMENT & PLAN NOTE
Baseline Cr ~1.3  - daily BMP  - strict Is/Os  - now with JEAN-PIERRE, likely prerenal from overdiuresis

## 2019-04-18 NOTE — HPI
Mr. Terrell is a 51 year old male with a past medical history significant for chronic systolic and diastolic heart failure, CVA, CAD, and prior MI who is currently admitted to observation for an acute CHF exacerbation. Patient states he had been compliant with diet but not necessarily fluid restriction. He endorsed worsening of his chronic leg swelling over the past 3 weeks as well as 30lb weight gain. He is on lasix 20mg po bid at home and was advised to increase to 40mg bid a week ago but did not have any improvement in his symptoms. He denies any fevers or chills, but does endorse a cough. On presentation he was satting well on room air, BNP of 431, troponin 0.065 which peaked to 0.99 and trended down. Patient has denied any chest pain or palpitations.    The patient was admitted to hospital medicine for diuresis, he was diuresing well on 80mg IV tid and metolazone but developed an JEAN-PIERRE on 4/18/19. Repeat Echo was notable for a EF of 23% (previously 25% in 2017) with 2 episodes of asymptomatic, nonsustained v-tac on telemetry and cardiology was consulted for possible [employer] / AICD evaluation. Patient has previously been evaluated for an AICD and he declined it at that time. Prior PET in 2015 when the EF was 35% was negative for ischemia.

## 2019-04-18 NOTE — PROGRESS NOTES
[facility] Hospital Medicine  Progress Note    Patient Name: Hamlet Terrell  MRN: [id number]  Patient Class: IP- Inpatient   Admission Date: 4/15/2019  Length of Stay: 0 days  Attending Physician: Joselyn Isabel MD  Primary Care Provider: Carlin Swift MD    [facility] Medicine Team: [facility] MED F Hazel Noriega PA-C    Subjective:     Principal Problem:Acute on chronic systolic congestive heart failure    HPI:  Patient is 52 yo male with a PMHx of HTN, chronic systolic and diastolic heart failure, CVA, CAD, and MI being admitted to observation for acute CHF exacerbation. Patient reports chronic leg swelling that has worsened over the last 3 weeks. He denies shortness of breath, but endorses cough, orthopnea, and a 30lb weight gain. He states that his Lasix was increased to 40mg twice a day weeks ago and he has seen no improvement in his symptoms. He denies dietary indiscretions, but has not been adherent to a fluid restricted diet. He denies fevers, chills, chest pain, abdominal distension. He has chronic low back pain.    In the ED, vitals stable on room air. CXR with edema. Intake labs remarkable for , troponin 0.065, alkphos 225, tbili 2.3. He was given lasix 80 IVP and admitted to observation for further management.    Hospital Course:  Mr. Terrell was admitted to observation for CHF exacerbation. TTE with EF 23%, diastolic dysfunction, severe biatrial enlargement, pulmonary hypertension, abnormal septal wall motion. Pharmacy consulted for coumadin, INR subtherapeutic. Increased warfarin to 12 mg daily, patient was taking 6 mg BID, educated patient on correct use. Troponin trended flat. Patient complaining of lumbar back pain, CT lumbar spine with mild lumbar spondylosis L5-S1 with mild posterior disc bulge resulting in mild neural foraminal narrowing. Incidental finding of indeterminate hypodense lesion in the right kidney. Retroperitoneal ultrasound poorly visualized the  lesion, will need continued monitored survelience. Cardiology consulted for evaluation of life vest vs. AICD for patient given runs of vtach and poor EF. Patient refused AICD/ life vest. Cr 2-->2.3, lasix held.     Interval History: Patient drowsy on exam 2/2 to benadryl for itching. Cr 2-->2.3, JEAN-PIERRE likely from over diuresis, lasix, losartan, and metolazone held. Patient still with crackles on exam, and BLE edema. INR rising quickly, warfarin decreased to 10 mg daily. Will discuss option of switching to DOAC at discharge. Cardiology consulted for evaluation of life vest vs. AICD as patient with runs of vtach and EF 23%. After long discussion, patient refusing any intervention. Will continue with goal directed therapy once JEAN-PIERRE resolves. CT lumbar spine with lumbar spondylosis and disc bulge, PT consulted. Incidental lesion found on right kidney, retroperitoneal US poorly visualized kidney, will need surveillance.     Review of Systems   Constitutional: Negative for chills and fever.   HENT: Negative for congestion and rhinorrhea.    Respiratory: Positive for shortness of breath. Negative for cough, chest tightness and wheezing.    Cardiovascular: Positive for leg swelling. Negative for chest pain and palpitations.   Gastrointestinal: Negative for abdominal pain, constipation, diarrhea and vomiting.   Genitourinary: Negative for dysuria and hematuria.   Musculoskeletal: Positive for back pain and gait problem.   Neurological: Negative for dizziness and seizures.   Psychiatric/Behavioral: Negative for agitation and behavioral problems.     Objective:     Vital Signs (Most Recent):  Temp: 98 °F (36.7 °C) (04/18/19 1543)  Pulse: 69 (04/18/19 1543)  Resp: 18 (04/18/19 1543)  BP: 112/72 (04/18/19 1543)  SpO2: (!) 92 % (04/18/19 1543) Vital Signs (24h Range):  Temp:  [98 °F (36.7 °C)-100.1 °F (37.8 °C)] 98 °F (36.7 °C)  Pulse:  [] 69  Resp:  [16-19] 18  SpO2:  [74 %-95 %] 92 %  BP: ()/(57-72) 112/72     Weight:  122.9 kg (271 lb)  Body mass index is 40.02 kg/m².    Intake/Output Summary (Last 24 hours) at 4/18/2019 1810  Last data filed at 4/17/2019 2247  Gross per 24 hour   Intake 240 ml   Output 200 ml   Net 40 ml      Physical Exam   Constitutional: He is oriented to person, place, and time. He appears well-developed and well-nourished. No distress.   HENT:   Head: Normocephalic and atraumatic.   Eyes: Pupils are equal, round, and reactive to light. EOM are normal.   Neck: Normal range of motion. Neck supple.   Cardiovascular: Normal rate and intact distal pulses. An irregularly irregular rhythm present.   No murmur heard.  Pulmonary/Chest: Effort normal. He has no wheezes. He has rales. He exhibits no tenderness.   Abdominal: Soft. Bowel sounds are normal. There is no tenderness. There is no rebound, no CVA tenderness, no tenderness at McBurney's point and negative Pham's sign.   Musculoskeletal: Normal range of motion. He exhibits edema (slightly improved BLE edema).   Neurological: He is alert and oriented to person, place, and time.   Skin: Skin is warm and dry. Capillary refill takes less than 2 seconds. He is not diaphoretic.   Psychiatric: He has a normal mood and affect. His behavior is normal. Judgment and thought content normal.   Nursing note and vitals reviewed.      Significant Labs:   CBC:   Recent Labs   Lab 04/17/19  0433 04/18/19  0426   WBC 6.06 5.96   HGB 8.9* 8.3*   HCT 29.9* 27.7*    201     CMP:   Recent Labs   Lab 04/17/19  1656 04/18/19  0425    136   K 4.8 4.4   CL 99 96   CO2 29 28   GLU 74 92   BUN 35* 41*   CREATININE 2.0* 2.3*   CALCIUM 9.1 9.1   PROT 8.0 8.0   ALBUMIN 3.4* 3.4*   BILITOT 1.5* 1.5*   ALKPHOS 191* 192*   AST 15 14   ALT 8* 8*   ANIONGAP 12 12   EGFRNONAA 37.3* 31.5*     Magnesium:   Recent Labs   Lab 04/17/19  0758 04/18/19  0425   MG 2.0 1.9     POCT Glucose: No results for input(s): POCTGLUCOSE in the last 48 hours.    Significant Imaging: I have reviewed all  pertinent imaging results/findings within the past 24 hours.    Assessment/Plan:      * Acute on chronic systolic congestive heart failure  NSVT  Patient presents with worsening LE and orthopnea for 3 weeks. His home Lasix 40mg BID has not helped him. He reports dry weight of 240lbs, currently 271. Given IVP 80mg in the ED with ~ 1L output.  - given Lasix 80 IV TID, metolazone 2.5mg before  - output ~1700L  - Lasix, metolazone held in setting of JEAN-PIERRE likely from rapid diuresis, will get repeat BMP and CXR in am  - patient still with crackles on exam and BLE edema  - restart gentler diuresis once JEAN-PIERRE resolves  - telemetry  - TTE with EF 23%, diastolic dysfunction, severe biatrial enlargement, pulmonary hypertension, abnormal septal wall motion  - cardiology consulted for life vest vs. AICD placement given runs of vtach and reduced EF. Patient refusing intervention after long discussion  - not currently on BB, ARB, will start prior to discharge  - strict Is/Os, daily weights  - cardiac diet, 1.5L fluid restriction    Elevated alkaline phosphatase level  - elevated Alk phos on admission, 225--> 192  - small ascites noted on CT  - RUQ abdominal ultrasound pending    JEAN-PIERRE (acute kidney injury)  Cr 2-->2.3  - combination of contrast induced nephropathy with prerenal JEAN-PIERRE due to over diuresis  - lasix, metolazone held  - urine studies pending  - started on IVF, but stopped with cardiology recommendation that oral hydration will resolve  - will restart gentler diuresis once resolved    Chronic kidney disease  Baseline Cr ~1.3  - daily BMP  - strict Is/Os  - now with JEAN-PIERRE, likely prerenal from overdiuresis    Coronary artery disease  History of CVA    - continue daily ASA  - not on statin,  lipid panel significant for low HDL  - troponin 0.065--> .077-->.075, flat    Lumbar back pain  Patient complaining of shooting lumbar pain X 1 week. Denies preceding injury  - CT lumbar spine with lumbar spondylosis L5-S1 with mild posterior  disc bulge  - PT/OT consulted  - incidental finding of small (1.8 cm) hypoattenuating lesion in the right kidney, not well characterized on this exam.  Neoplasia not excluded  -retroperitoneal ultrasound poorly visualized lesion secondary to poor sonographic penetration, will need surveilience    Essential hypertension  - not on HTN meds  - will start on Losartan once JEAN-PIERRE resolved    Atrial fibrillation, chronic  Chronic anticoagulation    - EKG with sinus rythym  - not on BB  - patient taking 6 mg BID, rather than 12 mg daily. Educated patient on proper use  - continue coumadin, consult PharmD  - INR 1.8 on admission, currently 2.3 and rising rapidly in setting of proper compliance  - decrease to 10 mg daily  - will discuss DOAC with patient as option as he has not followed his INR since october  - daily INR  - Will start on BB prior to discharge      VTE Risk Mitigation (From admission, onward)        Ordered     warfarin (COUMADIN) tablet 10 mg  Daily      04/18/19 1655     Place sequential compression device  Until discontinued      04/16/19 0124     IP VTE HIGH RISK PATIENT  Once      04/16/19 0124              Hazel Noriega PA-C  Department of Hospital Medicine   [facility]

## 2019-04-18 NOTE — CONSULTS
[facility]  Cardiology  Consult Note    Patient Name: Hamlet Terrell  MRN: [id number]  Admission Date: 4/15/2019  Hospital Length of Stay: 0 days  Code Status: Full Code   Attending Provider: Joselyn Isabel MD   Consulting Provider: Harshil Stephens MD  Primary Care Physician: Carlin Swift MD  Principal Problem:Acute on chronic systolic congestive heart failure    Patient information was obtained from patient, past medical records and ER records.     Inpatient consult to Cardiology  Consult performed by: Harshil Stephens MD  Consult ordered by: Hazel Noriega PA-C        Subjective:     Chief Complaint:  Shortness of breath and leg swelling     HPI:   Mr. Terrell is a 51 year old male with a past medical history significant for chronic systolic and diastolic heart failure, CVA, CAD, and prior MI who is currently admitted to observation for an acute CHF exacerbation. Patient states he had been compliant with diet but not necessarily fluid restriction. He endorsed worsening of his chronic leg swelling over the past 3 weeks as well as 30lb weight gain. He is on lasix 20mg po bid at home and was advised to increase to 40mg bid a week ago but did not have any improvement in his symptoms. He denies any fevers or chills, but does endorse a cough. On presentation he was satting well on room air, BNP of 431, troponin 0.065 which peaked to 0.99 and trended down. Patient has denied any chest pain or palpitations.    The patient was admitted to hospital medicine for diuresis, he was diuresing well on 80mg IV tid and metolazone but developed an JEAN-PIERRE on 4/18/19. Repeat Echo was notable for a EF of 23% (previously 25% in 2017) with 2 episodes of asymptomatic, nonsustained v-tac on telemetry and cardiology was consulted for possible Lifevest / AICD evaluation. Patient has previously been evaluated for an AICD and he declined it at that time. Prior PET in 2015 when the EF was 35% was negative for  ischemia.    Past Medical History:   Diagnosis Date    Anticoagulant long-term use     Cardiomegaly     CHF (congestive heart failure)     Chronic combined systolic and diastolic congestive heart failure     Coronary artery disease     CVA (cerebrovascular accident)     Heart attack 2006    Hypertension     Hyperthyroidism, subclinical 1/2/2013    MI (myocardial infarction) 9/22/2013    MI in 2009      Paroxysmal atrial fibrillation     PE (pulmonary embolism) 1/1/2013    IN 2010     S/P ablation of atrial flutter 2008    Stroke 2009    no residual weaknesses       Past Surgical History:   Procedure Laterality Date    RADIOFREQUENCY ABLATION  01/08/2008    for atrial flutter       Review of patient's allergies indicates:   Allergen Reactions    Acetaminophen      Itching    Oxycodone-acetaminophen      Other reaction(s): Itching    Ace inhibitors Other (See Comments)     cough       No current facility-administered medications on file prior to encounter.      Current Outpatient Medications on File Prior to Encounter   Medication Sig    albuterol (PROVENTIL/VENTOLIN HFA) 90 mcg/actuation inhaler Inhale 1-2 puffs into the lungs every 6 (six) hours as needed for Wheezing. Rescue    aspirin (ECOTRIN) 81 MG EC tablet Take 81 mg by mouth once daily.    furosemide (LASIX) 40 MG tablet Take 80 mg by mouth 2 (two) times daily.    nitroGLYCERIN (NITROSTAT) 0.4 MG SL tablet Place 1 tablet (0.4 mg total) under the tongue every 5 (five) minutes as needed for Chest pain. Tablet, Sublingual Sublingual    warfarin (COUMADIN) 6 MG tablet Take 6 mg by mouth 2 (two) times daily.     Family History     Problem Relation (Age of Onset)    Alcohol abuse Father    Hypertension Mother, Father, Sister, Brother    Stroke Mother        Tobacco Use    Smoking status: Never Smoker    Smokeless tobacco: Never Used   Substance and Sexual Activity    Alcohol use: No    Drug use: No    Sexual activity: Never     Review of  Systems   Constitution: Positive for weight gain. Negative for decreased appetite, fever and night sweats.   HENT: Negative for sore throat.    Eyes: Negative for visual disturbance.   Cardiovascular: Positive for dyspnea on exertion and leg swelling. Negative for chest pain, irregular heartbeat and palpitations.   Respiratory: Positive for shortness of breath. Negative for cough, sputum production and wheezing.    Hematologic/Lymphatic: Negative for adenopathy.   Skin: Negative for rash.   Musculoskeletal: Negative for joint pain, muscle cramps and myalgias.   Gastrointestinal: Negative for abdominal pain, constipation, diarrhea, hematemesis, melena, nausea and vomiting.   Genitourinary: Negative for dysuria.   Neurological: Negative for dizziness, focal weakness, headaches, light-headedness and weakness.     Objective:     Vital Signs (Most Recent):  Temp: 98 °F (36.7 °C) (04/18/19 1543)  Pulse: 69 (04/18/19 1543)  Resp: 18 (04/18/19 1543)  BP: 112/72 (04/18/19 1543)  SpO2: (!) 92 % (04/18/19 1543) Vital Signs (24h Range):  Temp:  [97.8 °F (36.6 °C)-100.1 °F (37.8 °C)] 98 °F (36.7 °C)  Pulse:  [] 69  Resp:  [16-19] 18  SpO2:  [74 %-95 %] 92 %  BP: ()/(57-78) 112/72     Weight: 122.9 kg (271 lb)  Body mass index is 40.02 kg/m².    SpO2: (!) 92 %  O2 Device (Oxygen Therapy): nasal cannula      Intake/Output Summary (Last 24 hours) at 4/18/2019 1550  Last data filed at 4/17/2019 2247  Gross per 24 hour   Intake 240 ml   Output 200 ml   Net 40 ml       Lines/Drains/Airways     Peripheral Intravenous Line                 Peripheral IV - Single Lumen 04/15/19 2304 18 G Right Antecubital 2 days                Physical Exam   Constitutional: He is oriented to person, place, and time. He appears well-developed and well-nourished. No distress.   HENT:   Head: Normocephalic and atraumatic.   Eyes: Pupils are equal, round, and reactive to light. Conjunctivae and EOM are normal. No scleral icterus.   Neck: Normal  range of motion. Neck supple. No JVD present.   Cardiovascular: Normal rate. An irregularly irregular rhythm present.   No murmur heard.  Pulmonary/Chest: Effort normal. He has rales (bilateral lung bases).   Abdominal: Soft. Bowel sounds are normal. He exhibits no distension. There is no tenderness. There is no rebound.   Musculoskeletal: Normal range of motion. He exhibits edema (bilateral lower extremitiy 2+ pitting).   Lymphadenopathy:     He has no cervical adenopathy.   Neurological: He is alert and oriented to person, place, and time.   Skin: Skin is warm and dry. No erythema.   Nursing note and vitals reviewed.      Significant Labs:   CMP   Recent Labs   Lab 04/16/19  1756 04/17/19  1656 04/18/19  0425    140 136   K 3.8 4.8 4.4   CL 97 99 96   CO2 32* 29 28   * 74 92   BUN 28* 35* 41*   CREATININE 1.5* 2.0* 2.3*   CALCIUM 10.0 9.1 9.1   PROT  --  8.0 8.0   ALBUMIN  --  3.4* 3.4*   BILITOT  --  1.5* 1.5*   ALKPHOS  --  191* 192*   AST  --  15 14   ALT  --  8* 8*   ANIONGAP 9 12 12   ESTGFRAFRICA >60.0 43.1* 36.4*   EGFRNONAA 52.8* 37.3* 31.5*   , CBC   Recent Labs   Lab 04/17/19  0433 04/18/19  0426   WBC 6.06 5.96   HGB 8.9* 8.3*   HCT 29.9* 27.7*    201     BNP  Recent Labs   Lab 04/15/19  2305   *        and All pertinent lab results from the last 24 hours have been reviewed.    Significant Imaging: X-Ray:   Narrative     EXAMINATION:  XR CHEST AP PORTABLE    CLINICAL HISTORY:  CHF;    TECHNIQUE:  Single frontal view of the chest was performed.    COMPARISON:  January 31, 2019.    FINDINGS:  Cardiomegaly with central vascular congestion and bilateral edema.    Small right effusion, similar to prior.    Heart and lungs  appear unchanged when allowing for differences in technique and positioning.      Impression       CHF.      Electronically signed by: Kelvin Partida MD  Date: 04/15/2019  Time: 23:49         Assessment and Plan:     * Acute on chronic systolic congestive heart  failure  Patient presenting with elevated BNP, dypnea, and worsening edema. Possibly due to diet noncompliance. Patient was not responding to oral lasix as an outpatient, received metolazone and IV lasix 80mg tid with good urine output, now with an JEAN-PIERRE from overdiuresis. Likely intravascularly depleted however still with crackles on exam. He will need less rapid diuresis once kidney injury improves. Patient with EF 23%, prior Echo 2 years previous with similar EF. Cardiology consulted for possibility of Lifevest. Patient not on GDMT as an outpatient, was started while inpatient however patient with episode of hypotension and new JEAN-PIERRE so ARB held.     Long discussion with patient regarding his current disease process. Given his low EF and the fact that he is now having episodes of VT, strongly recommended Lifevest with possible AICD for patient following trial of GDMT. Informed patient that he could possibly have a lethal arrhythmia which the AICD could shock him out of. The patient stated he was understanding of the risk but refuses AICD placement. Clarified patient's understanding of risk of lethal arrythmia twice and patient continued to refuse. As patient is refusing AICD, no benefit to Lifevest at this time. Will continue to discuss with patient and if he changes his mind will contact rep to get him fitted for Lifevest prior to discharge.    Recommendations:  - Hold further IV fluids for patient, normal oral hydration should be sufficient  - Continue fluid restriction as he is still volume overloaded, JEAN-PIERRE likely secondary to rapid intravascular depletion from diuresis  - Hold further diuresis pending improvement in renal function  - Patient will likely need gentler diuresis once it is resumed  - repeat BNP and CXR in the AM  - Continue to hold ARB as patient now with JEAN-PIERRE, aim to start once BP stable and JEAN-PIERRE resolved, uptitrate to maximum tolerated dose  - Metoprolol succinate or Carvedilol for GDMT once BP  stable    Atrial fibrillation, chronic  Patient on coumadin as an outpatient  - therapeutic, further adjustments per primary team and pharmacy        VTE Risk Mitigation (From admission, onward)        Ordered     warfarin tablet 12 mg  Daily      04/16/19 1602     Place sequential compression device  Until discontinued      04/16/19 0124     IP VTE HIGH RISK PATIENT  Once      04/16/19 0124          Thank you for your consult. I will follow-up with patient. Please contact us if you have any additional questions.    Harshil Stephnes MD  Cardiology   [facility]

## 2019-04-18 NOTE — ASSESSMENT & PLAN NOTE
Cr 2-->2.3  - combination of contrast induced nephropathy with prerenal JEAN-PIERRE due to over diuresis  - lasix, metolazone held  - urine studies pending  - started on IVF, but stopped with cardiology recommendation that oral hydration will resolve  - will restart gentler diuresis once resolved

## 2019-04-18 NOTE — PLAN OF CARE
Problem: Adult Inpatient Plan of Care  Goal: Plan of Care Review  Outcome: Ongoing (interventions implemented as appropriate)  Patient more alert than last night. Still having short runs of VTACH - asymptomatic. No significant changes

## 2019-04-18 NOTE — SUBJECTIVE & OBJECTIVE
Past Medical History:   Diagnosis Date    Anticoagulant long-term use     Cardiomegaly     CHF (congestive heart failure)     Chronic combined systolic and diastolic congestive heart failure     Coronary artery disease     CVA (cerebrovascular accident)     Heart attack 2006    Hypertension     Hyperthyroidism, subclinical 1/2/2013    MI (myocardial infarction) 9/22/2013    MI in 2009      Paroxysmal atrial fibrillation     PE (pulmonary embolism) 1/1/2013    IN 2010     S/P ablation of atrial flutter 2008    Stroke 2009    no residual weaknesses       Past Surgical History:   Procedure Laterality Date    RADIOFREQUENCY ABLATION  01/08/2008    for atrial flutter       Review of patient's allergies indicates:   Allergen Reactions    Acetaminophen      Itching    Oxycodone-acetaminophen      Other reaction(s): Itching    Ace inhibitors Other (See Comments)     cough       No current facility-administered medications on file prior to encounter.      Current Outpatient Medications on File Prior to Encounter   Medication Sig    albuterol (PROVENTIL/VENTOLIN HFA) 90 mcg/actuation inhaler Inhale 1-2 puffs into the lungs every 6 (six) hours as needed for Wheezing. Rescue    aspirin (ECOTRIN) 81 MG EC tablet Take 81 mg by mouth once daily.    furosemide (LASIX) 40 MG tablet Take 80 mg by mouth 2 (two) times daily.    nitroGLYCERIN (NITROSTAT) 0.4 MG SL tablet Place 1 tablet (0.4 mg total) under the tongue every 5 (five) minutes as needed for Chest pain. Tablet, Sublingual Sublingual    warfarin (COUMADIN) 6 MG tablet Take 6 mg by mouth 2 (two) times daily.     Family History     Problem Relation (Age of Onset)    Alcohol abuse Father    Hypertension Mother, Father, Sister, Brother    Stroke Mother        Tobacco Use    Smoking status: Never Smoker    Smokeless tobacco: Never Used   Substance and Sexual Activity    Alcohol use: No    Drug use: No    Sexual activity: Never     Review of Systems    Constitution: Positive for weight gain. Negative for decreased appetite, fever and night sweats.   HENT: Negative for sore throat.    Eyes: Negative for visual disturbance.   Cardiovascular: Positive for dyspnea on exertion and leg swelling. Negative for chest pain, irregular heartbeat and palpitations.   Respiratory: Positive for shortness of breath. Negative for cough, sputum production and wheezing.    Hematologic/Lymphatic: Negative for adenopathy.   Skin: Negative for rash.   Musculoskeletal: Negative for joint pain, muscle cramps and myalgias.   Gastrointestinal: Negative for abdominal pain, constipation, diarrhea, hematemesis, melena, nausea and vomiting.   Genitourinary: Negative for dysuria.   Neurological: Negative for dizziness, focal weakness, headaches, light-headedness and weakness.     Objective:     Vital Signs (Most Recent):  Temp: 98 °F (36.7 °C) (04/18/19 1543)  Pulse: 69 (04/18/19 1543)  Resp: 18 (04/18/19 1543)  BP: 112/72 (04/18/19 1543)  SpO2: (!) 92 % (04/18/19 1543) Vital Signs (24h Range):  Temp:  [97.8 °F (36.6 °C)-100.1 °F (37.8 °C)] 98 °F (36.7 °C)  Pulse:  [] 69  Resp:  [16-19] 18  SpO2:  [74 %-95 %] 92 %  BP: ()/(57-78) 112/72     Weight: 122.9 kg (271 lb)  Body mass index is 40.02 kg/m².    SpO2: (!) 92 %  O2 Device (Oxygen Therapy): nasal cannula      Intake/Output Summary (Last 24 hours) at 4/18/2019 1550  Last data filed at 4/17/2019 2247  Gross per 24 hour   Intake 240 ml   Output 200 ml   Net 40 ml       Lines/Drains/Airways     Peripheral Intravenous Line                 Peripheral IV - Single Lumen 04/15/19 2304 18 G Right Antecubital 2 days                Physical Exam   Constitutional: He is oriented to person, place, and time. He appears well-developed and well-nourished. No distress.   HENT:   Head: Normocephalic and atraumatic.   Eyes: Pupils are equal, round, and reactive to light. Conjunctivae and EOM are normal. No scleral icterus.   Neck: Normal range of  motion. Neck supple. No JVD present.   Cardiovascular: Normal rate. An irregularly irregular rhythm present.   No murmur heard.  Pulmonary/Chest: Effort normal. He has rales (bilateral lung bases).   Abdominal: Soft. Bowel sounds are normal. He exhibits no distension. There is no tenderness. There is no rebound.   Musculoskeletal: Normal range of motion. He exhibits edema (bilateral lower extremitiy 2+ pitting).   Lymphadenopathy:     He has no cervical adenopathy.   Neurological: He is alert and oriented to person, place, and time.   Skin: Skin is warm and dry. No erythema.   Nursing note and vitals reviewed.      Significant Labs:   CMP   Recent Labs   Lab 04/16/19  1756 04/17/19  1656 04/18/19  0425    140 136   K 3.8 4.8 4.4   CL 97 99 96   CO2 32* 29 28   * 74 92   BUN 28* 35* 41*   CREATININE 1.5* 2.0* 2.3*   CALCIUM 10.0 9.1 9.1   PROT  --  8.0 8.0   ALBUMIN  --  3.4* 3.4*   BILITOT  --  1.5* 1.5*   ALKPHOS  --  191* 192*   AST  --  15 14   ALT  --  8* 8*   ANIONGAP 9 12 12   ESTGFRAFRICA >60.0 43.1* 36.4*   EGFRNONAA 52.8* 37.3* 31.5*   , CBC   Recent Labs   Lab 04/17/19  0433 04/18/19  0426   WBC 6.06 5.96   HGB 8.9* 8.3*   HCT 29.9* 27.7*    201     BNP  Recent Labs   Lab 04/15/19  2305   *        and All pertinent lab results from the last 24 hours have been reviewed.    Significant Imaging: X-Ray:   Narrative     EXAMINATION:  XR CHEST AP PORTABLE    CLINICAL HISTORY:  CHF;    TECHNIQUE:  Single frontal view of the chest was performed.    COMPARISON:  January 31, 2019.    FINDINGS:  Cardiomegaly with central vascular congestion and bilateral edema.    Small right effusion, similar to prior.    Heart and lungs  appear unchanged when allowing for differences in technique and positioning.      Impression       CHF.      Electronically signed by: Kelvin Partida MD  Date: 04/15/2019  Time: 23:49

## 2019-04-18 NOTE — ASSESSMENT & PLAN NOTE
Chronic anticoagulation    - EKG with sinus rythym  - not on BB  - patient taking 6 mg BID, rather than 12 mg daily. Educated patient on proper use  - continue coumadin, consult PharmD  - INR 1.8 on admission, currently 2.3 and rising rapidly in setting of proper compliance  - decrease to 10 mg daily  - will discuss DOAC with patient as option as he has not followed his INR since october  - daily INR  - Will start on BB prior to discharge

## 2019-04-18 NOTE — ASSESSMENT & PLAN NOTE
Patient on coumadin as an outpatient  - therapeutic, further adjustments per primary team and pharmacy

## 2019-04-18 NOTE — ASSESSMENT & PLAN NOTE
- elevated Alk phos on admission, 225--> 192  - small ascites noted on CT  - RUQ abdominal ultrasound pending

## 2019-04-18 NOTE — ASSESSMENT & PLAN NOTE
Patient presenting with elevated BNP, dypnea, and worsening edema. Possibly due to diet noncompliance. Patient was not responding to oral lasix as an outpatient, received metolazone and IV lasix 80mg tid with good urine output, now with an JEAN-PIERRE from overdiuresis. Likely intravascularly depleted however still with crackles on exam. He will need less rapid diuresis once kidney injury improves. Patient with EF 23%, prior Echo 2 years previous with similar EF. Cardiology consulted for possibility of Lifevest. Patient not on GDMT as an outpatient, was started while inpatient however patient with episode of hypotension and new JEAN-PIERRE so ARB held.     Long discussion with patient regarding his current disease process. Given his low EF and the fact that he is now having episodes of VT, strongly recommended Lifevest with possible AICD for patient following trial of GDMT. Informed patient that he could possibly have a lethal arrhythmia which the AICD could shock him out of. The patient stated he was understanding of the risk but refuses AICD placement. Clarified patient's understanding of risk of lethal arrythmia twice and patient continued to refuse. As patient is refusing AICD, no benefit to Lifevest at this time. Will continue to discuss with patient and if he changes his mind will contact rep to get him fitted for Lifevest prior to discharge.    Recommendations:  - Hold further IV fluids for patient, normal oral hydration should be sufficient  - Continue fluid restriction as he is still volume overloaded, JEAN-PIERRE likely secondary to rapid intravascular depletion from diuresis  - Hold further diuresis pending improvement in renal function  - Patient will likely need gentler diuresis once it is resumed  - repeat BNP and CXR in the AM  - Continue to hold ARB as patient now with JEAN-PIERRE, aim to start once BP stable and JEAN-PIERRE resolved, uptitrate to maximum tolerated dose  - Metoprolol succinate or Carvedilol for GDMT once BP stable

## 2019-04-18 NOTE — SUBJECTIVE & OBJECTIVE
Interval History: Patient drowsy on exam 2/2 to benadryl for itching. Cr 2-->2.3, JEAN-PIERRE likely from over diuresis, lasix, losartan, and metolazone held. Patient still with crackles on exam, and BLE edema. INR rising quickly, warfarin decreased to 10 mg daily. Will discuss option of switching to DOAC at discharge. Cardiology consulted for evaluation of life vest vs. AICD as patient with runs of vtach and EF 23%. After long discussion, patient refusing any intervention. Will continue with goal directed therapy once JEAN-PIERRE resolves. CT lumbar spine with lumbar spondylosis and disc bulge, PT consulted. Incidental lesion found on right kidney, retroperitoneal US poorly visualized kidney, will need surveillance.     Review of Systems   Constitutional: Negative for chills and fever.   HENT: Negative for congestion and rhinorrhea.    Respiratory: Positive for shortness of breath. Negative for cough, chest tightness and wheezing.    Cardiovascular: Positive for leg swelling. Negative for chest pain and palpitations.   Gastrointestinal: Negative for abdominal pain, constipation, diarrhea and vomiting.   Genitourinary: Negative for dysuria and hematuria.   Musculoskeletal: Positive for back pain and gait problem.   Neurological: Negative for dizziness and seizures.   Psychiatric/Behavioral: Negative for agitation and behavioral problems.     Objective:     Vital Signs (Most Recent):  Temp: 98 °F (36.7 °C) (04/18/19 1543)  Pulse: 69 (04/18/19 1543)  Resp: 18 (04/18/19 1543)  BP: 112/72 (04/18/19 1543)  SpO2: (!) 92 % (04/18/19 1543) Vital Signs (24h Range):  Temp:  [98 °F (36.7 °C)-100.1 °F (37.8 °C)] 98 °F (36.7 °C)  Pulse:  [] 69  Resp:  [16-19] 18  SpO2:  [74 %-95 %] 92 %  BP: ()/(57-72) 112/72     Weight: 122.9 kg (271 lb)  Body mass index is 40.02 kg/m².    Intake/Output Summary (Last 24 hours) at 4/18/2019 1810  Last data filed at 4/17/2019 2247  Gross per 24 hour   Intake 240 ml   Output 200 ml   Net 40 ml       Physical Exam   Constitutional: He is oriented to person, place, and time. He appears well-developed and well-nourished. No distress.   HENT:   Head: Normocephalic and atraumatic.   Eyes: Pupils are equal, round, and reactive to light. EOM are normal.   Neck: Normal range of motion. Neck supple.   Cardiovascular: Normal rate and intact distal pulses. An irregularly irregular rhythm present.   No murmur heard.  Pulmonary/Chest: Effort normal. He has no wheezes. He has rales. He exhibits no tenderness.   Abdominal: Soft. Bowel sounds are normal. There is no tenderness. There is no rebound, no CVA tenderness, no tenderness at McBurney's point and negative Pham's sign.   Musculoskeletal: Normal range of motion. He exhibits edema (slightly improved BLE edema).   Neurological: He is alert and oriented to person, place, and time.   Skin: Skin is warm and dry. Capillary refill takes less than 2 seconds. He is not diaphoretic.   Psychiatric: He has a normal mood and affect. His behavior is normal. Judgment and thought content normal.   Nursing note and vitals reviewed.      Significant Labs:   CBC:   Recent Labs   Lab 04/17/19  0433 04/18/19  0426   WBC 6.06 5.96   HGB 8.9* 8.3*   HCT 29.9* 27.7*    201     CMP:   Recent Labs   Lab 04/17/19  1656 04/18/19  0425    136   K 4.8 4.4   CL 99 96   CO2 29 28   GLU 74 92   BUN 35* 41*   CREATININE 2.0* 2.3*   CALCIUM 9.1 9.1   PROT 8.0 8.0   ALBUMIN 3.4* 3.4*   BILITOT 1.5* 1.5*   ALKPHOS 191* 192*   AST 15 14   ALT 8* 8*   ANIONGAP 12 12   EGFRNONAA 37.3* 31.5*     Magnesium:   Recent Labs   Lab 04/17/19  0758 04/18/19  0425   MG 2.0 1.9     POCT Glucose: No results for input(s): POCTGLUCOSE in the last 48 hours.    Significant Imaging: I have reviewed all pertinent imaging results/findings within the past 24 hours.

## 2019-04-18 NOTE — ASSESSMENT & PLAN NOTE
Patient complaining of shooting lumbar pain X 1 week. Denies preceding injury  - CT lumbar spine with lumbar spondylosis L5-S1 with mild posterior disc bulge  - PT/OT consulted  - incidental finding of small (1.8 cm) hypoattenuating lesion in the right kidney, not well characterized on this exam.  Neoplasia not excluded  -retroperitoneal ultrasound poorly visualized lesion secondary to poor sonographic penetration, will need surveilience

## 2019-04-18 NOTE — ASSESSMENT & PLAN NOTE
NSVT  Patient presents with worsening LE and orthopnea for 3 weeks. His home Lasix 40mg BID has not helped him. He reports dry weight of 240lbs, currently 271. Given IVP 80mg in the ED with ~ 1L output.  - given Lasix 80 IV TID, metolazone 2.5mg before  - output ~1700L  - Lasix, metolazone held in setting of JEAN-PIERRE likely from rapid diuresis, will get repeat BMP and CXR in am  - patient still with crackles on exam and BLE edema  - restart gentler diuresis once JEAN-PIERRE resolves  - telemetry  - TTE with EF 23%, diastolic dysfunction, severe biatrial enlargement, pulmonary hypertension, abnormal septal wall motion  - cardiology consulted for life vest vs. AICD placement given runs of vtach and reduced EF. Patient refusing intervention after long discussion  - not currently on BB, ARB, will start prior to discharge  - strict Is/Os, daily weights  - cardiac diet, 1.5L fluid restriction

## 2019-04-19 LAB
ALBUMIN SERPL BCP-MCNC: 3.3 G/DL (ref 3.5–5.2)
ALBUMIN SERPL BCP-MCNC: 3.3 G/DL (ref 3.5–5.2)
ALP SERPL-CCNC: 198 U/L (ref 55–135)
ALP SERPL-CCNC: 202 U/L (ref 55–135)
ALT SERPL W/O P-5'-P-CCNC: 7 U/L (ref 10–44)
ALT SERPL W/O P-5'-P-CCNC: 9 U/L (ref 10–44)
ANION GAP SERPL CALC-SCNC: 10 MMOL/L (ref 8–16)
ANION GAP SERPL CALC-SCNC: 11 MMOL/L (ref 8–16)
AST SERPL-CCNC: 13 U/L (ref 10–40)
AST SERPL-CCNC: 14 U/L (ref 10–40)
BASOPHILS # BLD AUTO: 0.03 K/UL (ref 0–0.2)
BASOPHILS NFR BLD: 0.6 % (ref 0–1.9)
BILIRUB SERPL-MCNC: 1.2 MG/DL (ref 0.1–1)
BILIRUB SERPL-MCNC: 1.3 MG/DL (ref 0.1–1)
BNP SERPL-MCNC: 250 PG/ML (ref 0–99)
BUN SERPL-MCNC: 52 MG/DL (ref 6–20)
BUN SERPL-MCNC: 54 MG/DL (ref 6–20)
CALCIUM SERPL-MCNC: 9.1 MG/DL (ref 8.7–10.5)
CALCIUM SERPL-MCNC: 9.1 MG/DL (ref 8.7–10.5)
CHLORIDE SERPL-SCNC: 97 MMOL/L (ref 95–110)
CHLORIDE SERPL-SCNC: 97 MMOL/L (ref 95–110)
CK SERPL-CCNC: 129 U/L (ref 20–200)
CO2 SERPL-SCNC: 28 MMOL/L (ref 23–29)
CO2 SERPL-SCNC: 29 MMOL/L (ref 23–29)
CREAT SERPL-MCNC: 2.6 MG/DL (ref 0.5–1.4)
CREAT SERPL-MCNC: 2.6 MG/DL (ref 0.5–1.4)
DIFFERENTIAL METHOD: ABNORMAL
EOSINOPHIL # BLD AUTO: 0.3 K/UL (ref 0–0.5)
EOSINOPHIL NFR BLD: 5.5 % (ref 0–8)
ERYTHROCYTE [DISTWIDTH] IN BLOOD BY AUTOMATED COUNT: 15.7 % (ref 11.5–14.5)
EST. GFR  (AFRICAN AMERICAN): 31.4 ML/MIN/1.73 M^2
EST. GFR  (AFRICAN AMERICAN): 31.4 ML/MIN/1.73 M^2
EST. GFR  (NON AFRICAN AMERICAN): 27.1 ML/MIN/1.73 M^2
EST. GFR  (NON AFRICAN AMERICAN): 27.1 ML/MIN/1.73 M^2
GLUCOSE SERPL-MCNC: 101 MG/DL (ref 70–110)
GLUCOSE SERPL-MCNC: 112 MG/DL (ref 70–110)
HCT VFR BLD AUTO: 26.8 % (ref 40–54)
HGB BLD-MCNC: 8.2 G/DL (ref 14–18)
IMM GRANULOCYTES # BLD AUTO: 0.03 K/UL (ref 0–0.04)
IMM GRANULOCYTES NFR BLD AUTO: 0.6 % (ref 0–0.5)
INR PPP: 3 (ref 0.8–1.2)
LYMPHOCYTES # BLD AUTO: 1 K/UL (ref 1–4.8)
LYMPHOCYTES NFR BLD: 19.7 % (ref 18–48)
MAGNESIUM SERPL-MCNC: 2 MG/DL (ref 1.6–2.6)
MAGNESIUM SERPL-MCNC: 2.1 MG/DL (ref 1.6–2.6)
MCH RBC QN AUTO: 25.9 PG (ref 27–31)
MCHC RBC AUTO-ENTMCNC: 30.6 G/DL (ref 32–36)
MCV RBC AUTO: 85 FL (ref 82–98)
MONOCYTES # BLD AUTO: 1.1 K/UL (ref 0.3–1)
MONOCYTES NFR BLD: 23.2 % (ref 4–15)
NEUTROPHILS # BLD AUTO: 2.5 K/UL (ref 1.8–7.7)
NEUTROPHILS NFR BLD: 50.4 % (ref 38–73)
NRBC BLD-RTO: 0 /100 WBC
PLATELET # BLD AUTO: 184 K/UL (ref 150–350)
PMV BLD AUTO: 10.1 FL (ref 9.2–12.9)
POTASSIUM SERPL-SCNC: 4.2 MMOL/L (ref 3.5–5.1)
POTASSIUM SERPL-SCNC: 4.3 MMOL/L (ref 3.5–5.1)
PROT SERPL-MCNC: 7.7 G/DL (ref 6–8.4)
PROT SERPL-MCNC: 7.8 G/DL (ref 6–8.4)
PROTHROMBIN TIME: 29.1 SEC (ref 9–12.5)
RBC # BLD AUTO: 3.17 M/UL (ref 4.6–6.2)
SODIUM SERPL-SCNC: 135 MMOL/L (ref 136–145)
SODIUM SERPL-SCNC: 137 MMOL/L (ref 136–145)
WBC # BLD AUTO: 4.87 K/UL (ref 3.9–12.7)

## 2019-04-19 PROCEDURE — 94761 N-INVAS EAR/PLS OXIMETRY MLT: CPT

## 2019-04-19 PROCEDURE — 99222 PR INITIAL HOSPITAL CARE,LEVL II: ICD-10-PCS | Mod: ,,, | Performed by: INTERNAL MEDICINE

## 2019-04-19 PROCEDURE — 63600175 PHARM REV CODE 636 W HCPCS: Performed by: PHYSICIAN ASSISTANT

## 2019-04-19 PROCEDURE — 85610 PROTHROMBIN TIME: CPT

## 2019-04-19 PROCEDURE — 25000003 PHARM REV CODE 250: Performed by: PHYSICIAN ASSISTANT

## 2019-04-19 PROCEDURE — 36415 COLL VENOUS BLD VENIPUNCTURE: CPT

## 2019-04-19 PROCEDURE — 80053 COMPREHEN METABOLIC PANEL: CPT

## 2019-04-19 PROCEDURE — 85025 COMPLETE CBC W/AUTO DIFF WBC: CPT

## 2019-04-19 PROCEDURE — 99233 PR SUBSEQUENT HOSPITAL CARE,LEVL III: ICD-10-PCS | Mod: ,,, | Performed by: PHYSICIAN ASSISTANT

## 2019-04-19 PROCEDURE — 99222 1ST HOSP IP/OBS MODERATE 55: CPT | Mod: ,,, | Performed by: INTERNAL MEDICINE

## 2019-04-19 PROCEDURE — 80053 COMPREHEN METABOLIC PANEL: CPT | Mod: 91

## 2019-04-19 PROCEDURE — 83735 ASSAY OF MAGNESIUM: CPT

## 2019-04-19 PROCEDURE — 20600001 HC STEP DOWN PRIVATE ROOM

## 2019-04-19 PROCEDURE — 83880 ASSAY OF NATRIURETIC PEPTIDE: CPT

## 2019-04-19 PROCEDURE — 99233 SBSQ HOSP IP/OBS HIGH 50: CPT | Mod: ,,, | Performed by: PHYSICIAN ASSISTANT

## 2019-04-19 PROCEDURE — 83735 ASSAY OF MAGNESIUM: CPT | Mod: 91

## 2019-04-19 PROCEDURE — 82550 ASSAY OF CK (CPK): CPT

## 2019-04-19 RX ORDER — HYDROMORPHONE HYDROCHLORIDE 1 MG/ML
0.2 INJECTION, SOLUTION INTRAMUSCULAR; INTRAVENOUS; SUBCUTANEOUS EVERY 6 HOURS PRN
Status: DISCONTINUED | OUTPATIENT
Start: 2019-04-19 | End: 2019-04-23 | Stop reason: HOSPADM

## 2019-04-19 RX ORDER — WARFARIN SODIUM 5 MG/1
5 TABLET ORAL DAILY
Status: DISCONTINUED | OUTPATIENT
Start: 2019-04-19 | End: 2019-04-20

## 2019-04-19 RX ADMIN — TIZANIDINE 4 MG: 4 TABLET ORAL at 09:04

## 2019-04-19 RX ADMIN — HYDROMORPHONE HYDROCHLORIDE 0.5 MG: 1 INJECTION, SOLUTION INTRAMUSCULAR; INTRAVENOUS; SUBCUTANEOUS at 01:04

## 2019-04-19 RX ADMIN — SODIUM CHLORIDE 250 ML: 0.9 INJECTION, SOLUTION INTRAVENOUS at 09:04

## 2019-04-19 RX ADMIN — ASPIRIN 81 MG: 81 TABLET, COATED ORAL at 09:04

## 2019-04-19 RX ADMIN — LIDOCAINE 2 PATCH: 50 PATCH TOPICAL at 01:04

## 2019-04-19 RX ADMIN — AMMONIUM LACTATE: 12 LOTION TOPICAL at 09:04

## 2019-04-19 RX ADMIN — WARFARIN SODIUM 5 MG: 5 TABLET ORAL at 05:04

## 2019-04-19 NOTE — NURSING
"Per Patient Care Tech (PCT) while in the pt's room getting oral temperature. Pt stated "you fucking pushed the thermometer in my mouth too hard." Pt then continued to stare intensely in a threatening matter at her (per the PCT). She became uncomfortable and left the pt's room.   "

## 2019-04-19 NOTE — PLAN OF CARE
Problem: Adult Inpatient Plan of Care  Goal: Plan of Care Review  Outcome: Ongoing (interventions implemented as appropriate)  POC reviewed with the pt. Pt transferred from observation. No acute changes. Will continue to monitor.

## 2019-04-19 NOTE — ASSESSMENT & PLAN NOTE
Cr 2-->2.3-->2.6  - combination of contrast induced nephropathy with prerenal JEAN-PIERRE due to over diuresis  - lasix, metolazone held  - FeUrea with prerenal JEAN-PIERRE  - started on IVF, but stopped with cardiology recommendation that oral hydration will resolve; given 250 cc bolus in setting of hypotension  - will restart gentler diuresis once resolved

## 2019-04-19 NOTE — NURSING
Had a 15 beat run of v-tach,asymptomatic.Georges HODGE.notified.stat cmp and mag ordered.will monitor.

## 2019-04-19 NOTE — ASSESSMENT & PLAN NOTE
Chronic anticoagulation    - EKG with afib  - not on BB  - patient taking 6 mg BID, rather than 12 mg daily. Educated patient on proper use. Has not had INR checked since October  - INR 1.8 on admission, subtherapeutic  - continue coumadin, consult PharmD  - INR rising rapidly, INR today 3  - decrease coumadin from 10 mg to 5 mg daily  - Extensive discussion with patient on switching to DOAC, not interested in this time  - daily INR  - Will start on BB prior to discharge

## 2019-04-19 NOTE — ASSESSMENT & PLAN NOTE
- elevated Alk phos on admission, 225--> 192--> 198  - small ascites noted on CT  - RUQ abdominal ultrasound without liver abnormality, no ascites noted, cholelithiasis vs. Gallbladder cyst  - will monitor

## 2019-04-19 NOTE — NURSING
bp 90/50 manually.hr 52.remains in afib.tizanidine held this am due to low bp.pt received dilaudid 0.5mg iv as ordered for btp at 1am.AYESHA [place] notified.will monitor.

## 2019-04-19 NOTE — PROGRESS NOTES
"Occupational Therapy     Hamlet Terrell   MRN: [id number]     Upon entering room, patient found seated in bedside chair and was pleasant and interactive with providing social history.  Per patient:  Patient resides in [place] alone in one story home with 3 steps to enter, no rail.  PTA patient independent with ADLs including driving.  Currently owns no DME.  Patient is left handed. Unemployed.  Hobbies:  Fixing cars.      Subjective:   Patient stating: "It hurts when I stand on my feet."  Nurse reporting:  "He was able to get from the bed to the chair on his own.  He has been non-compliant and refusing everything."    Objective:  Upon assessment of mobility and ADLs, patient refusing to participate.      Plan:  Will attempt 4/20/19.     DHRUV Godfrey  4/19/2019            "

## 2019-04-19 NOTE — PROGRESS NOTES
[facility] Hospital Medicine  Progress Note    Patient Name: Hamlet Terrell  MRN: [id number]  Patient Class: IP- Inpatient   Admission Date: 4/15/2019  Length of Stay: 1 days  Attending Physician: Joselyn Isabel MD  Primary Care Provider: Carlin Swift MD    [facility] Medicine Team: [facility] [place], [place] HOSP MED F Hazel Noriega PA-C    Subjective:     Principal Problem:Acute on chronic systolic congestive heart failure    HPI:  Patient is 52 yo male with a PMHx of HTN, chronic systolic and diastolic heart failure, CVA, CAD, and MI being admitted to observation for acute CHF exacerbation. Patient reports chronic leg swelling that has worsened over the last 3 weeks. He denies shortness of breath, but endorses cough, orthopnea, and a 30lb weight gain. He states that his Lasix was increased to 40mg twice a day weeks ago and he has seen no improvement in his symptoms. He denies dietary indiscretions, but has not been adherent to a fluid restricted diet. He denies fevers, chills, chest pain, abdominal distension. He has chronic low back pain.    In the ED, vitals stable on room air. CXR with edema. Intake labs remarkable for , troponin 0.065, alkphos 225, tbili 2.3. He was given lasix 80 IVP and admitted to observation for further management.    Hospital Course:  Mr. Terrell was admitted to observation for CHF exacerbation. TTE with EF 23%, diastolic dysfunction, severe biatrial enlargement, pulmonary hypertension, abnormal septal wall motion. Pharmacy consulted for coumadin, INR subtherapeutic. Increased warfarin to 12 mg daily, patient was taking 6 mg BID, educated patient on correct use. Troponin trended flat. Patient complaining of lumbar back pain, CT lumbar spine with mild lumbar spondylosis L5-S1 with mild posterior disc bulge resulting in mild neural foraminal narrowing. Incidental finding of indeterminate hypodense lesion in the right kidney. Retroperitoneal ultrasound poorly visualized the  "lesion, will need continued monitored survelience. Cardiology consulted for evaluation of life vest vs. AICD for patient given runs of vtach and poor EF. Patient refused AICD/ life vest. Cr 2-->2.3, lasix held.     Interval History: Patient hypotensive this morning, given 250 cc bolus. Pulses good throughout. Cr continues to rise 2.3-->2.6. Continue Lasix holiday. FeUrea shows prerenal cause for JEAN-PIERRE. Alk Phos continues to be elevated. RUQ ultrasound showed cholelithiasis vs gallbladder polyp, liver normal. Patient complaining of bilateral "squeezing" forearm pain that started at 4 am this morning. CPK pending. Repeat , down from admit. Repeat CXR with bilateral pulmonary edema. INR 3, discussed with inpatient pharamcy, decreased warfarin to 5mg. Discussed switching warfarin to DOAC, patient not interested at this time because the medication may make him "throw up" and he doesn't want to have "up and down levels". Explained to patient extensively that DOACs do not need INR monitoring, and since patient has not had INR checked since October and it was subtherapeutic on admit that this would be the preferable option. Patient refused. Reports the reason he has not checked his INR since October was because he forgot his password to his [facility] account.    Review of Systems   Constitutional: Negative for chills and fever.   HENT: Negative for congestion and rhinorrhea.    Respiratory: Positive for shortness of breath. Negative for cough, chest tightness and wheezing.    Cardiovascular: Positive for leg swelling. Negative for chest pain and palpitations.   Gastrointestinal: Negative for abdominal pain, constipation, diarrhea and vomiting.   Genitourinary: Negative for dysuria and hematuria.   Musculoskeletal: Positive for back pain, gait problem and myalgias ("squeezing" bilateral forearm pain).   Skin: Negative for color change and pallor.   Neurological: Negative for dizziness and seizures. "   Psychiatric/Behavioral: Negative for agitation and behavioral problems.     Objective:     Vital Signs (Most Recent):  Temp: (!) 95.5 °F (35.3 °C) (04/19/19 0508)  Pulse: (!) 54 (04/19/19 0752)  Resp: 16 (04/19/19 0935)  BP: 111/66 (04/19/19 0752)  SpO2: 98 % (04/19/19 0935) Vital Signs (24h Range):  Temp:  [95.5 °F (35.3 °C)-98.7 °F (37.1 °C)] 95.5 °F (35.3 °C)  Pulse:  [43-72] 54  Resp:  [16-19] 16  SpO2:  [90 %-98 %] 98 %  BP: ()/(50-75) 111/66     Weight: 122.9 kg (271 lb)  Body mass index is 40.02 kg/m².  No intake or output data in the 24 hours ending 04/19/19 1110   Physical Exam   Constitutional: He is oriented to person, place, and time. He appears well-developed and well-nourished. No distress.   HENT:   Head: Normocephalic and atraumatic.   Eyes: Pupils are equal, round, and reactive to light. EOM are normal.   Neck: Normal range of motion. Neck supple.   Cardiovascular: Normal rate and intact distal pulses. An irregularly irregular rhythm present.   No murmur heard.  Pulmonary/Chest: Effort normal. He has no wheezes. He has rales. He exhibits no tenderness.   Abdominal: Soft. Bowel sounds are normal. There is no tenderness. There is no rebound, no CVA tenderness, no tenderness at McBurney's point and negative Pham's sign.   Musculoskeletal: Normal range of motion. He exhibits edema (slightly improved BLE edema) and tenderness (bilateral forearm).   Neurological: He is alert and oriented to person, place, and time.   Skin: Skin is warm and dry. Capillary refill takes less than 2 seconds. He is not diaphoretic.   Psychiatric: He has a normal mood and affect. His behavior is normal. Judgment and thought content normal.   Nursing note and vitals reviewed.      Significant Labs:   CBC:   Recent Labs   Lab 04/18/19  0426 04/19/19  0420   WBC 5.96 4.87   HGB 8.3* 8.2*   HCT 27.7* 26.8*    184     CMP:   Recent Labs   Lab 04/18/19  0425 04/19/19  0043 04/19/19  0420    137 135*   K 4.4 4.3  4.2   CL 96 97 97   CO2 28 29 28   GLU 92 101 112*   BUN 41* 52* 54*   CREATININE 2.3* 2.6* 2.6*   CALCIUM 9.1 9.1 9.1   PROT 8.0 7.8 7.7   ALBUMIN 3.4* 3.3* 3.3*   BILITOT 1.5* 1.3* 1.2*   ALKPHOS 192* 202* 198*   AST 14 14 13   ALT 8* 9* 7*   ANIONGAP 12 11 10   EGFRNONAA 31.5* 27.1* 27.1*     Cardiac Markers:   Recent Labs   Lab 04/19/19  0420   *     Coagulation:   Recent Labs   Lab 04/19/19  0420   INR 3.0*     Magnesium:   Recent Labs   Lab 04/18/19  0425 04/19/19  0043 04/19/19  0420   MG 1.9 2.1 2.0       Significant Imaging: I have reviewed all pertinent imaging results/findings within the past 24 hours.    Assessment/Plan:      * Acute on chronic systolic congestive heart failure  NSVT  Patient presents with worsening LE and orthopnea for 3 weeks. His home Lasix 40mg BID has not helped him. He reports dry weight of 240lbs, currently 271. Given IVP 80mg in the ED with ~ 1L output.  on admission.  - TTE with EF 23%, diastolic dysfunction, severe biatrial enlargement, pulmonary hypertension, abnormal septal wall motion  - cardiology consulted for life vest vs. AICD placement given runs of vtach and reduced EF. Patient refusing intervention after long discussion  - given Lasix 80 IV TID, metolazone 2.5mg before  - output ~1700L  - Lasix, metolazone held in setting of JEAN-PIERRE likely from rapid diuresis, will get repeat BMP and CXR in am  - patient still with crackles on exam and BLE edema  - restart gentler diuresis once JEAN-PIERRE resolves  - repeat   - CXR with pulmonary edema  - 250cc bolus given for hypotension  - telemetry  - not currently on BB, ARB, will start prior to discharge  - strict Is/Os, daily weights  - cardiac diet, 1.5L fluid restriction    Elevated alkaline phosphatase level  - elevated Alk phos on admission, 225--> 192--> 198  - small ascites noted on CT  - RUQ abdominal ultrasound without liver abnormality, no ascites noted, cholelithiasis vs. Gallbladder cyst  - will  monitor    JEAN-PIERRE (acute kidney injury)  Cr 2-->2.3-->2.6  - combination of contrast induced nephropathy with prerenal JEAN-PIERRE due to over diuresis  - lasix, metolazone held  - FeUrea with prerenal JEAN-PIERRE  - started on IVF, but stopped with cardiology recommendation that oral hydration will resolve; given 250 cc bolus in setting of hypotension  - will restart gentler diuresis once resolved    Chronic kidney disease  Baseline Cr ~1.3  - daily BMP  - strict Is/Os  - now with JEAN-PIERRE, likely prerenal from overdiuresis    Coronary artery disease  History of CVA    - continue daily ASA  - not on statin,  lipid panel significant for low HDL  - troponin 0.065--> .077-->.075, flat    Lumbar back pain  Patient complaining of shooting lumbar pain X 1 week. Denies preceding injury  - CT lumbar spine with lumbar spondylosis L5-S1 with mild posterior disc bulge  - PT/OT consulted  - incidental finding of small (1.8 cm) hypoattenuating lesion in the right kidney, not well characterized on this exam.  Neoplasia not excluded  -retroperitoneal ultrasound poorly visualized lesion secondary to poor sonographic penetration, will need surveilience    Essential hypertension  - not on HTN meds  - will start on Losartan once JEAN-PIERRE resolved  - Hypotensive this am, given 250 cc bolus in setting of CHF exacerbation    Atrial fibrillation, chronic  Chronic anticoagulation    - EKG with afib  - not on BB  - patient taking 6 mg BID, rather than 12 mg daily. Educated patient on proper use. Has not had INR checked since October  - INR 1.8 on admission, subtherapeutic  - continue coumadin, consult PharmD  - INR rising rapidly, INR today 3  - decrease coumadin from 10 mg to 5 mg daily  - Extensive discussion with patient on switching to DOAC, not interested in this time  - daily INR  - Will start on BB prior to discharge      VTE Risk Mitigation (From admission, onward)        Ordered     warfarin (COUMADIN) tablet 5 mg  Daily      04/19/19 1131     Place  sequential compression device  Until discontinued      04/16/19 0124     IP VTE HIGH RISK PATIENT  Once      04/16/19 0124              Hazel Noriega PA-C  Department of Hospital Medicine   [facility]

## 2019-04-19 NOTE — SUBJECTIVE & OBJECTIVE
"Interval History: Patient hypotensive this morning, given 250 cc bolus. Pulses good throughout. Cr continues to rise 2.3-->2.6. Continue Lasix holiday. FeUrea shows prerenal cause for JEAN-PIERRE. Alk Phos continues to be elevated. RUQ ultrasound showed cholelithiasis vs gallbladder polyp, liver normal. Patient complaining of bilateral "squeezing" forearm pain that started at 4 am this morning. CPK pending. Repeat , down from admit. Repeat CXR with bilateral pulmonary edema. INR 3, discussed with inpatient pharamcy, decreased warfarin to 5mg. Discussed switching warfarin to DOAC, patient not interested at this time because the medication may make him "throw up" and he doesn't want to have "up and down levels". Explained to patient extensively that DOACs do not need INR monitoring, and since patient has not had INR checked since October and it was subtherapeutic on admit that this would be the preferable option. Patient refused. Reports the reason he has not checked his INR since October was because he forgot his password to his [facility] account.    Review of Systems   Constitutional: Negative for chills and fever.   HENT: Negative for congestion and rhinorrhea.    Respiratory: Positive for shortness of breath. Negative for cough, chest tightness and wheezing.    Cardiovascular: Positive for leg swelling. Negative for chest pain and palpitations.   Gastrointestinal: Negative for abdominal pain, constipation, diarrhea and vomiting.   Genitourinary: Negative for dysuria and hematuria.   Musculoskeletal: Positive for back pain, gait problem and myalgias ("squeezing" bilateral forearm pain).   Skin: Negative for color change and pallor.   Neurological: Negative for dizziness and seizures.   Psychiatric/Behavioral: Negative for agitation and behavioral problems.     Objective:     Vital Signs (Most Recent):  Temp: (!) 95.5 °F (35.3 °C) (04/19/19 0508)  Pulse: (!) 54 (04/19/19 0752)  Resp: 16 (04/19/19 0935)  BP: 111/66 " (04/19/19 0752)  SpO2: 98 % (04/19/19 0935) Vital Signs (24h Range):  Temp:  [95.5 °F (35.3 °C)-98.7 °F (37.1 °C)] 95.5 °F (35.3 °C)  Pulse:  [43-72] 54  Resp:  [16-19] 16  SpO2:  [90 %-98 %] 98 %  BP: ()/(50-75) 111/66     Weight: 122.9 kg (271 lb)  Body mass index is 40.02 kg/m².  No intake or output data in the 24 hours ending 04/19/19 1110   Physical Exam   Constitutional: He is oriented to person, place, and time. He appears well-developed and well-nourished. No distress.   HENT:   Head: Normocephalic and atraumatic.   Eyes: Pupils are equal, round, and reactive to light. EOM are normal.   Neck: Normal range of motion. Neck supple.   Cardiovascular: Normal rate and intact distal pulses. An irregularly irregular rhythm present.   No murmur heard.  Pulmonary/Chest: Effort normal. He has no wheezes. He has rales. He exhibits no tenderness.   Abdominal: Soft. Bowel sounds are normal. There is no tenderness. There is no rebound, no CVA tenderness, no tenderness at McBurney's point and negative Pham's sign.   Musculoskeletal: Normal range of motion. He exhibits edema (slightly improved BLE edema) and tenderness (bilateral forearm).   Neurological: He is alert and oriented to person, place, and time.   Skin: Skin is warm and dry. Capillary refill takes less than 2 seconds. He is not diaphoretic.   Psychiatric: He has a normal mood and affect. His behavior is normal. Judgment and thought content normal.   Nursing note and vitals reviewed.      Significant Labs:   CBC:   Recent Labs   Lab 04/18/19  0426 04/19/19  0420   WBC 5.96 4.87   HGB 8.3* 8.2*   HCT 27.7* 26.8*    184     CMP:   Recent Labs   Lab 04/18/19  0425 04/19/19  0043 04/19/19  0420    137 135*   K 4.4 4.3 4.2   CL 96 97 97   CO2 28 29 28   GLU 92 101 112*   BUN 41* 52* 54*   CREATININE 2.3* 2.6* 2.6*   CALCIUM 9.1 9.1 9.1   PROT 8.0 7.8 7.7   ALBUMIN 3.4* 3.3* 3.3*   BILITOT 1.5* 1.3* 1.2*   ALKPHOS 192* 202* 198*   AST 14 14 13   ALT  8* 9* 7*   ANIONGAP 12 11 10   EGFRNONAA 31.5* 27.1* 27.1*     Cardiac Markers:   Recent Labs   Lab 04/19/19  0420   *     Coagulation:   Recent Labs   Lab 04/19/19  0420   INR 3.0*     Magnesium:   Recent Labs   Lab 04/18/19  0425 04/19/19  0043 04/19/19  0420   MG 1.9 2.1 2.0       Significant Imaging: I have reviewed all pertinent imaging results/findings within the past 24 hours.

## 2019-04-19 NOTE — ASSESSMENT & PLAN NOTE
NSVT  Patient presents with worsening LE and orthopnea for 3 weeks. His home Lasix 40mg BID has not helped him. He reports dry weight of 240lbs, currently 271. Given IVP 80mg in the ED with ~ 1L output.  on admission.  - TTE with EF 23%, diastolic dysfunction, severe biatrial enlargement, pulmonary hypertension, abnormal septal wall motion  - cardiology consulted for life vest vs. AICD placement given runs of vtach and reduced EF. Patient refusing intervention after long discussion  - given Lasix 80 IV TID, metolazone 2.5mg before  - output ~1700L  - Lasix, metolazone held in setting of JEAN-PIERRE likely from rapid diuresis, will get repeat BMP and CXR in am  - patient still with crackles on exam and BLE edema  - restart gentler diuresis once JEAN-PIERRE resolves  - repeat   - CXR with pulmonary edema  - 250cc bolus given for hypotension  - telemetry  - not currently on BB, ARB, will start prior to discharge  - strict Is/Os, daily weights  - cardiac diet, 1.5L fluid restriction

## 2019-04-19 NOTE — PLAN OF CARE
Problem: Adult Inpatient Plan of Care  Goal: Plan of Care Review  Outcome: Ongoing (interventions implemented as appropriate)  Pt remains on telemetry and continues to have runs of v-tach,asymptomatic.remains in a-fib.abdomen distended,edema to ble.c/o abd pain relieved with iv dilaudid.back pain controlled with tizaidine.safety precautions maintained,pt free of falls or injuries.continue plan of care.

## 2019-04-19 NOTE — ASSESSMENT & PLAN NOTE
- not on HTN meds  - will start on Losartan once JEAN-PIERRE resolved  - Hypotensive this am, given 250 cc bolus in setting of CHF exacerbation

## 2019-04-20 LAB
ALBUMIN SERPL BCP-MCNC: 3.4 G/DL (ref 3.5–5.2)
ALP SERPL-CCNC: 205 U/L (ref 55–135)
ALT SERPL W/O P-5'-P-CCNC: 7 U/L (ref 10–44)
ANION GAP SERPL CALC-SCNC: 12 MMOL/L (ref 8–16)
AST SERPL-CCNC: 12 U/L (ref 10–40)
BASOPHILS # BLD AUTO: 0.04 K/UL (ref 0–0.2)
BASOPHILS NFR BLD: 0.8 % (ref 0–1.9)
BILIRUB SERPL-MCNC: 1.1 MG/DL (ref 0.1–1)
BUN SERPL-MCNC: 53 MG/DL (ref 6–20)
CALCIUM SERPL-MCNC: 9.3 MG/DL (ref 8.7–10.5)
CHLORIDE SERPL-SCNC: 97 MMOL/L (ref 95–110)
CO2 SERPL-SCNC: 26 MMOL/L (ref 23–29)
CREAT SERPL-MCNC: 2 MG/DL (ref 0.5–1.4)
DIFFERENTIAL METHOD: ABNORMAL
EOSINOPHIL # BLD AUTO: 0.3 K/UL (ref 0–0.5)
EOSINOPHIL NFR BLD: 5.6 % (ref 0–8)
ERYTHROCYTE [DISTWIDTH] IN BLOOD BY AUTOMATED COUNT: 15.6 % (ref 11.5–14.5)
EST. GFR  (AFRICAN AMERICAN): 43.1 ML/MIN/1.73 M^2
EST. GFR  (NON AFRICAN AMERICAN): 37.3 ML/MIN/1.73 M^2
GLUCOSE SERPL-MCNC: 123 MG/DL (ref 70–110)
HCT VFR BLD AUTO: 27.8 % (ref 40–54)
HGB BLD-MCNC: 8.4 G/DL (ref 14–18)
IMM GRANULOCYTES # BLD AUTO: 0.01 K/UL (ref 0–0.04)
IMM GRANULOCYTES NFR BLD AUTO: 0.2 % (ref 0–0.5)
INR PPP: 4.2 (ref 0.8–1.2)
LYMPHOCYTES # BLD AUTO: 0.8 K/UL (ref 1–4.8)
LYMPHOCYTES NFR BLD: 15.7 % (ref 18–48)
MAGNESIUM SERPL-MCNC: 2.1 MG/DL (ref 1.6–2.6)
MCH RBC QN AUTO: 26.1 PG (ref 27–31)
MCHC RBC AUTO-ENTMCNC: 30.2 G/DL (ref 32–36)
MCV RBC AUTO: 86 FL (ref 82–98)
MONOCYTES # BLD AUTO: 1 K/UL (ref 0.3–1)
MONOCYTES NFR BLD: 20.3 % (ref 4–15)
NEUTROPHILS # BLD AUTO: 2.8 K/UL (ref 1.8–7.7)
NEUTROPHILS NFR BLD: 57.4 % (ref 38–73)
NRBC BLD-RTO: 0 /100 WBC
PLATELET # BLD AUTO: 192 K/UL (ref 150–350)
PMV BLD AUTO: 10.3 FL (ref 9.2–12.9)
POTASSIUM SERPL-SCNC: 3.8 MMOL/L (ref 3.5–5.1)
PROT SERPL-MCNC: 7.9 G/DL (ref 6–8.4)
PROTHROMBIN TIME: 41.2 SEC (ref 9–12.5)
RBC # BLD AUTO: 3.22 M/UL (ref 4.6–6.2)
SODIUM SERPL-SCNC: 135 MMOL/L (ref 136–145)
WBC # BLD AUTO: 4.83 K/UL (ref 3.9–12.7)

## 2019-04-20 PROCEDURE — 97535 SELF CARE MNGMENT TRAINING: CPT

## 2019-04-20 PROCEDURE — 85610 PROTHROMBIN TIME: CPT

## 2019-04-20 PROCEDURE — 63600175 PHARM REV CODE 636 W HCPCS: Performed by: PHYSICIAN ASSISTANT

## 2019-04-20 PROCEDURE — 25000003 PHARM REV CODE 250: Performed by: PHYSICIAN ASSISTANT

## 2019-04-20 PROCEDURE — 36415 COLL VENOUS BLD VENIPUNCTURE: CPT

## 2019-04-20 PROCEDURE — 97165 OT EVAL LOW COMPLEX 30 MIN: CPT

## 2019-04-20 PROCEDURE — 80053 COMPREHEN METABOLIC PANEL: CPT

## 2019-04-20 PROCEDURE — 83735 ASSAY OF MAGNESIUM: CPT

## 2019-04-20 PROCEDURE — 99233 PR SUBSEQUENT HOSPITAL CARE,LEVL III: ICD-10-PCS | Mod: ,,, | Performed by: PHYSICIAN ASSISTANT

## 2019-04-20 PROCEDURE — 20600001 HC STEP DOWN PRIVATE ROOM

## 2019-04-20 PROCEDURE — 99233 SBSQ HOSP IP/OBS HIGH 50: CPT | Mod: ,,, | Performed by: PHYSICIAN ASSISTANT

## 2019-04-20 PROCEDURE — 85025 COMPLETE CBC W/AUTO DIFF WBC: CPT

## 2019-04-20 RX ORDER — MORPHINE SULFATE 2 MG/ML
1 INJECTION, SOLUTION INTRAMUSCULAR; INTRAVENOUS ONCE
Status: COMPLETED | OUTPATIENT
Start: 2019-04-20 | End: 2019-04-20

## 2019-04-20 RX ORDER — HYDROXYZINE HYDROCHLORIDE 25 MG/1
25 TABLET, FILM COATED ORAL NIGHTLY PRN
Status: DISCONTINUED | OUTPATIENT
Start: 2019-04-20 | End: 2019-04-22

## 2019-04-20 RX ADMIN — TIZANIDINE 4 MG: 4 TABLET ORAL at 01:04

## 2019-04-20 RX ADMIN — MORPHINE SULFATE 1 MG: 2 INJECTION, SOLUTION INTRAMUSCULAR; INTRAVENOUS at 05:04

## 2019-04-20 RX ADMIN — HYDROXYZINE HYDROCHLORIDE 25 MG: 25 TABLET, FILM COATED ORAL at 04:04

## 2019-04-20 RX ADMIN — AMMONIUM LACTATE: 12 LOTION TOPICAL at 09:04

## 2019-04-20 RX ADMIN — AMMONIUM LACTATE: 12 LOTION TOPICAL at 08:04

## 2019-04-20 RX ADMIN — TIZANIDINE 4 MG: 4 TABLET ORAL at 09:04

## 2019-04-20 RX ADMIN — TIZANIDINE 4 MG: 4 TABLET ORAL at 05:04

## 2019-04-20 RX ADMIN — HYDROMORPHONE HYDROCHLORIDE 0.2 MG: 1 INJECTION, SOLUTION INTRAMUSCULAR; INTRAVENOUS; SUBCUTANEOUS at 09:04

## 2019-04-20 RX ADMIN — HYDROXYZINE HYDROCHLORIDE 25 MG: 25 TABLET, FILM COATED ORAL at 08:04

## 2019-04-20 RX ADMIN — HYDROMORPHONE HYDROCHLORIDE 0.2 MG: 1 INJECTION, SOLUTION INTRAMUSCULAR; INTRAVENOUS; SUBCUTANEOUS at 03:04

## 2019-04-20 NOTE — ASSESSMENT & PLAN NOTE
Patient complaining of shooting lumbar pain X 1 week. Denies preceding injury  - CT lumbar spine with lumbar spondylosis L5-S1 with mild posterior disc bulge  - PT/OT consulted, recommend HH  - incidental finding of small (1.8 cm) hypoattenuating lesion in the right kidney, not well characterized on this exam.  Neoplasia not excluded  -retroperitoneal ultrasound poorly visualized lesion secondary to poor sonographic penetration, will need surveilience

## 2019-04-20 NOTE — PT/OT/SLP EVAL
"Occupational Therapy   Evaluation    Name: Hamlet Terrell  MRN: [id number]  Admitting Diagnosis:  Acute on chronic systolic congestive heart failure      Recommendations:     Discharge Recommendations: home health OT  Discharge Equipment Recommendations:     Barriers to discharge:  None    Assessment:     Hamlet Terrell is a 53 y.o. male with a medical diagnosis of Acute on chronic systolic congestive heart failure.  He presents with performance deficits affecting function: impaired functional mobilty, impaired self care skills, impaired endurance.      Rehab Prognosis: Good; patient would benefit from acute skilled OT services to address these deficits and reach maximum level of function.       Plan:     Patient to be seen 2 x/week to address the above listed problems via self-care/home management, therapeutic activities, therapeutic exercises  · Plan of Care Expires: 05/17/19  · Plan of Care Reviewed with: patient    Subjective     Patient: "I feel like I am 76.  I wouldn't use a walker but I'd use a nice looking cane."  "I am itching all over."    Occupational Profile:  Patient resides in [place] alone in one story home with 3 steps to enter, no rail.  PTA patient independent with ADLs including driving.  Currently owns no DME.  Patient is left handed. Unemployed.  Hobbies:  Fixing cars.      Pain/Comfort:  · Pain Rating 1: 0/10  · Pain Rating Post-Intervention 1: 0/10    Patients cultural, spiritual, Evangelical conflicts given the current situation: no    Objective:     Communicated with: Nurse prior to session.  Patient found supine with bed alarm, SCD, telemetry upon OT entry to room.  Family not present.    General Precautions: Standard, fall   Orthopedic Precautions:N/A   Braces: N/A     Occupational Performance:    Bed Mobility:    · Patient completed Rolling/Turning to Left with  modified independence  · Patient completed Rolling/Turning to Right with modified independence  · Patient completed " Scooting/Bridging with modified independence  · Patient completed Supine to Sit with modified independence  · Patient completed Sit to Supine with modified independence    Functional Mobility/Transfers:  · Patient completed Sit <> Stand Transfer with supervision  with  no assistive device   · Patient completed Bed <> Chair Transfer using Stand Pivot technique with stand by assistance with no assistive device    Activities of Daily Living:  · Grooming: stand by assistance while standing  · Upper Body Dressing: stand by assistance while seated EOB  · Toileting: stand by assistance      Cognitive/Visual Perceptual:  Cognitive/Psychosocial Skills:     -       Oriented to: Person, Place, Time and Situation   -       Follows Commands/attention:Follows one-step commands  -       Communication: clear/fluent    Physical Exam:  Postural examination/scapula alignment:    -       Rounded shoulders  Skin integrity: Visible skin intact  Edema:  Moderate bilateral LEs  Sensation:    -       Intact  Upper Extremity Range of Motion:     -       Right Upper Extremity: WNL  -       Left Upper Extremity: WNL  Upper Extremity Strength:    -       Right Upper Extremity: 4/5  -       Left Upper Extremity: 4/5    AMPAC 6 Click ADL:  AMPAC Total Score: 19    Treatment & Education:  Patient education provided on role of OT and need for continued OT services.   Continued education, patient/ family training recommended.  Patient alert and oriented x 3; able to follow 4/4 one step commands.  Patient attentive and interactive throughout the session.  Patient's functional status and disposition recommendation discussed with patient.  OT asked if there were any other questions; patient had no further questions.   Education:  Patient left supine with all lines intact and call button in reach    GOALS:   Multidisciplinary Problems     Occupational Therapy Goals        Problem: Occupational Therapy Goal    Goal Priority Disciplines Outcome  Interventions   Occupational Therapy Goal     OT, PT/OT     Description:  Goals set 4/20 to be addressed for 7 days with expiration date, 4/27:  Patient will increase functional independence with ADLs by performing:    Patient will demonstrate stand pivot transfers with modified independence.   Not met  Patient will demonstrate grooming while standing with modified independence.   Not met  Patient will demonstrate lower body dressing with modified independence while seated EOB.   Not met  Patient will demonstrate toileting with modified independence.   Not met  Patient will demonstrate bathing while seated EOB with modified independence.   Not met  Patient will demonstrate independence with HEP for energy conservation during ADLs.    Not met                        History:     Past Medical History:   Diagnosis Date    Anticoagulant long-term use     Cardiomegaly     CHF (congestive heart failure)     Chronic combined systolic and diastolic congestive heart failure     Coronary artery disease     CVA (cerebrovascular accident)     Heart attack 2006    Hypertension     Hyperthyroidism, subclinical 1/2/2013    MI (myocardial infarction) 9/22/2013    MI in 2009      Paroxysmal atrial fibrillation     PE (pulmonary embolism) 1/1/2013    IN 2010     S/P ablation of atrial flutter 2008    Stroke 2009    no residual weaknesses       Past Surgical History:   Procedure Laterality Date    RADIOFREQUENCY ABLATION  01/08/2008    for atrial flutter       Time Tracking:     OT Date of Treatment: 04/20/19  OT Start Time: 0638  OT Stop Time: 0703  OT Total Time (min): 25 min    Billable Minutes:Evaluation 15  Self Care/Home Management 10    DHRUV Godfrey  4/20/2019

## 2019-04-20 NOTE — SUBJECTIVE & OBJECTIVE
Interval History: Patient complaining of generalized pruritis. Last dose of acetaminophen was 3 days prior, unlikely cause, possible cause is hyperbilirubinemia. Continue with vistaril. Cr improving from 2.6->2, continue Lasix holiday. INR 4.2, coumadin held. No signs of bleeding, no melena, hematuria, bruising. Will monitor. If Cr continues to improve, likely restart home dose of Lasix tomorrow. Tele with afib.     Review of Systems   Constitutional: Negative for chills and fever.   HENT: Negative for congestion and rhinorrhea.    Respiratory: Positive for shortness of breath. Negative for cough, chest tightness and wheezing.    Cardiovascular: Positive for leg swelling. Negative for chest pain and palpitations.   Gastrointestinal: Negative for abdominal pain, constipation, diarrhea and vomiting.   Genitourinary: Negative for dysuria and hematuria.   Musculoskeletal: Positive for back pain and gait problem. Negative for myalgias.   Skin: Negative for color change, pallor and wound.   Neurological: Negative for dizziness and seizures.   Psychiatric/Behavioral: Negative for agitation and behavioral problems.     Objective:     Vital Signs (Most Recent):  Temp: 98 °F (36.7 °C) (04/20/19 0800)  Pulse: 74 (04/20/19 0800)  Resp: 18 (04/20/19 0800)  BP: 105/60 (04/20/19 0800)  SpO2: 97 % (04/20/19 0800) Vital Signs (24h Range):  Temp:  [97.6 °F (36.4 °C)-98.2 °F (36.8 °C)] 98 °F (36.7 °C)  Pulse:  [53-74] 74  Resp:  [18-20] 18  SpO2:  [96 %-98 %] 97 %  BP: (105-129)/(60-66) 105/60     Weight: 127.5 kg (281 lb 1.4 oz)  Body mass index is 41.51 kg/m².    Intake/Output Summary (Last 24 hours) at 4/20/2019 1535  Last data filed at 4/20/2019 0600  Gross per 24 hour   Intake 560 ml   Output 1150 ml   Net -590 ml      Physical Exam   Constitutional: He is oriented to person, place, and time. He appears well-developed and well-nourished. No distress.   HENT:   Head: Normocephalic and atraumatic.   Eyes: Pupils are equal, round,  and reactive to light. EOM are normal.   Neck: Normal range of motion. Neck supple.   Cardiovascular: Normal rate and intact distal pulses. An irregularly irregular rhythm present.   No murmur heard.  Pulmonary/Chest: Effort normal. He has no wheezes. He has rales. He exhibits no tenderness.   Abdominal: Soft. Bowel sounds are normal. There is no tenderness. There is no rebound, no CVA tenderness, no tenderness at McBurney's point and negative Pham's sign.   Musculoskeletal: Normal range of motion. He exhibits edema (slightly improved BLE edema). He exhibits no tenderness.   Neurological: He is alert and oriented to person, place, and time.   Skin: Skin is warm and dry. Capillary refill takes less than 2 seconds. He is not diaphoretic.   Psychiatric: He has a normal mood and affect. His behavior is normal. Judgment and thought content normal.   Nursing note and vitals reviewed.      Significant Labs:   Bilirubin:   Recent Labs   Lab 04/17/19  1656 04/18/19  0425 04/19/19  0043 04/19/19  0420 04/20/19  0501   BILITOT 1.5* 1.5* 1.3* 1.2* 1.1*     CBC:   Recent Labs   Lab 04/19/19  0420 04/20/19  0501   WBC 4.87 4.83   HGB 8.2* 8.4*   HCT 26.8* 27.8*    192     CMP:   Recent Labs   Lab 04/19/19  0043 04/19/19  0420 04/20/19  0501    135* 135*   K 4.3 4.2 3.8   CL 97 97 97   CO2 29 28 26    112* 123*   BUN 52* 54* 53*   CREATININE 2.6* 2.6* 2.0*   CALCIUM 9.1 9.1 9.3   PROT 7.8 7.7 7.9   ALBUMIN 3.3* 3.3* 3.4*   BILITOT 1.3* 1.2* 1.1*   ALKPHOS 202* 198* 205*   AST 14 13 12   ALT 9* 7* 7*   ANIONGAP 11 10 12   EGFRNONAA 27.1* 27.1* 37.3*     Coagulation:   Recent Labs   Lab 04/20/19  0501   INR 4.2*     Magnesium:   Recent Labs   Lab 04/19/19  0043 04/19/19  0420 04/20/19  0501   MG 2.1 2.0 2.1       Significant Imaging: I have reviewed all pertinent imaging results/findings within the past 24 hours.

## 2019-04-20 NOTE — ASSESSMENT & PLAN NOTE
Cr 2-->2.3-->2.6--> 2  - combination of contrast induced nephropathy with prerenal JEAN-PIERRE due to over diuresis  - lasix, metolazone held  - FeUrea with prerenal JEAN-PIERRE  - started on IVF, but stopped with cardiology recommendation that oral hydration will resolve; given 250 cc bolus in setting of hypotension  - will restart gentler diuresis once resolved

## 2019-04-20 NOTE — PLAN OF CARE
"Problem: Adult Inpatient Plan of Care  Goal: Plan of Care Review  Outcome: Ongoing (interventions implemented as appropriate)     04/20/19 0644   Plan of Care Review   Plan of Care Reviewed With patient   AAO x 4 noted with flat affect ,angry , c/o generalized pain and itching : prn Dilaudid 0.2mg admin IVP , pt refused benadryl : states " I am tired of being in pain can I have Morphine ." : Call IMF team , new orders for 1 x dose of Morphine 1 mg ivp & atarx for itching : bilateral lower ext noted with discoloration : Ongoing Consult with Cardiologist : Tele A-fib with runs of V- Tach : SCD in place : Strict I/O's: independent of bed mobility : safety rounds performed : WTCM .      "

## 2019-04-20 NOTE — ASSESSMENT & PLAN NOTE
NSVT  Patient presents with worsening LE and orthopnea for 3 weeks. His home Lasix 40mg BID has not helped him. He reports dry weight of 240lbs, currently 271. Given IVP 80mg in the ED with ~ 1L output.  on admission.  - TTE with EF 23%, diastolic dysfunction, severe biatrial enlargement, pulmonary hypertension, abnormal septal wall motion  - cardiology consulted for life vest vs. AICD placement given runs of vtach and reduced EF. Patient refusing intervention after long discussion  - given Lasix 80 IV TID, metolazone 2.5mg before  - output ~1700L  - Lasix, metolazone held in setting of JEAN-PIERRE likely from rapid diuresis, will get repeat BMP and CXR in am  - patient still with crackles on exam and BLE edema  - restart gentler diuresis once JEAN-PIERRE resolves  - repeat   - CXR with pulmonary edema  - telemetry  - not currently on BB, ARB, will start prior to discharge  - strict Is/Os, daily weights  - cardiac diet, 1.5L fluid restriction

## 2019-04-20 NOTE — ASSESSMENT & PLAN NOTE
Chronic anticoagulation    - EKG with afib  - not on BB  - patient taking 6 mg BID, rather than 12 mg daily. Educated patient on proper use. Has not had INR checked since October  - INR 1.8 on admission, subtherapeutic  - continue coumadin, consult PharmD  - INR rising rapidly, INR 4.2, warfarin held. No signs of bleeding, bruising, melena, hematuria.   - Extensive discussion with patient on switching to DOAC, not interested in this time  - daily INR  - Will start on BB prior to discharge

## 2019-04-20 NOTE — PLAN OF CARE
Problem: Occupational Therapy Goal  Goal: Occupational Therapy Goal  OT evaluation completed.  DHRUV Godfrey  4/20/2019

## 2019-04-20 NOTE — PLAN OF CARE
Problem: Adult Inpatient Plan of Care  Goal: Plan of Care Review  Outcome: Ongoing (interventions implemented as appropriate)  POC reviewed with the pt. No acute changes throughout the shift. Pt running SR on monitor. Will continue to monitor.

## 2019-04-20 NOTE — PROGRESS NOTES
[facility] Hospital Medicine  Progress Note    Patient Name: Hamlet Terrell  MRN: [id number]  Patient Class: IP- Inpatient   Admission Date: 4/15/2019  Length of Stay: 2 days  Attending Physician: Joselyn Isabel MD  Primary Care Provider: Carlin Swift MD    [facility] Medicine Team: [facility] HOSP MED F Hazel Noriega PA-C    Subjective:     Principal Problem:Acute on chronic systolic congestive heart failure    HPI:  Patient is 52 yo male with a PMHx of HTN, chronic systolic and diastolic heart failure, CVA, CAD, and MI being admitted to observation for acute CHF exacerbation. Patient reports chronic leg swelling that has worsened over the last 3 weeks. He denies shortness of breath, but endorses cough, orthopnea, and a 30lb weight gain. He states that his Lasix was increased to 40mg twice a day weeks ago and he has seen no improvement in his symptoms. He denies dietary indiscretions, but has not been adherent to a fluid restricted diet. He denies fevers, chills, chest pain, abdominal distension. He has chronic low back pain.    In the ED, vitals stable on room air. CXR with edema. Intake labs remarkable for , troponin 0.065, alkphos 225, tbili 2.3. He was given lasix 80 IVP and admitted to observation for further management.    Hospital Course:  Mr. Terrell was admitted to observation for CHF exacerbation. TTE with EF 23%, diastolic dysfunction, severe biatrial enlargement, pulmonary hypertension, abnormal septal wall motion. Pharmacy consulted for coumadin, INR subtherapeutic. Increased warfarin to 12 mg daily, patient was taking 6 mg BID, educated patient on correct use. Troponin trended flat. Patient complaining of lumbar back pain, CT lumbar spine with mild lumbar spondylosis L5-S1 with mild posterior disc bulge resulting in mild neural foraminal narrowing. Incidental finding of indeterminate hypodense lesion in the right kidney. Retroperitoneal ultrasound poorly visualized the  lesion, will need continued monitored survelience. Cardiology consulted for evaluation of life vest vs. AICD for patient given runs of vtach and poor EF. Patient refused AICD/ life vest. Cr 2-->2.3, lasix held --> 2.6--> 2. INR supratherapeutic, warfarin held.    Interval History: Patient complaining of generalized pruritis. Last dose of acetaminophen was 3 days prior, unlikely cause, possible cause is hyperbilirubinemia. Continue with vistaril. Cr improving from 2.6->2, continue Lasix holiday. INR 4.2, coumadin held. No signs of bleeding, no melena, hematuria, bruising. Will monitor. If Cr continues to improve, likely restart home dose of Lasix tomorrow. Tele with afib.     Review of Systems   Constitutional: Negative for chills and fever.   HENT: Negative for congestion and rhinorrhea.    Respiratory: Positive for shortness of breath. Negative for cough, chest tightness and wheezing.    Cardiovascular: Positive for leg swelling. Negative for chest pain and palpitations.   Gastrointestinal: Negative for abdominal pain, constipation, diarrhea and vomiting.   Genitourinary: Negative for dysuria and hematuria.   Musculoskeletal: Positive for back pain and gait problem. Negative for myalgias.   Skin: Negative for color change, pallor and wound.   Neurological: Negative for dizziness and seizures.   Psychiatric/Behavioral: Negative for agitation and behavioral problems.     Objective:     Vital Signs (Most Recent):  Temp: 98 °F (36.7 °C) (04/20/19 0800)  Pulse: 74 (04/20/19 0800)  Resp: 18 (04/20/19 0800)  BP: 105/60 (04/20/19 0800)  SpO2: 97 % (04/20/19 0800) Vital Signs (24h Range):  Temp:  [97.6 °F (36.4 °C)-98.2 °F (36.8 °C)] 98 °F (36.7 °C)  Pulse:  [53-74] 74  Resp:  [18-20] 18  SpO2:  [96 %-98 %] 97 %  BP: (105-129)/(60-66) 105/60     Weight: 127.5 kg (281 lb 1.4 oz)  Body mass index is 41.51 kg/m².    Intake/Output Summary (Last 24 hours) at 4/20/2019 1535  Last data filed at 4/20/2019 0600  Gross per 24 hour    Intake 560 ml   Output 1150 ml   Net -590 ml      Physical Exam   Constitutional: He is oriented to person, place, and time. He appears well-developed and well-nourished. No distress.   HENT:   Head: Normocephalic and atraumatic.   Eyes: Pupils are equal, round, and reactive to light. EOM are normal.   Neck: Normal range of motion. Neck supple.   Cardiovascular: Normal rate and intact distal pulses. An irregularly irregular rhythm present.   No murmur heard.  Pulmonary/Chest: Effort normal. He has no wheezes. He has rales. He exhibits no tenderness.   Abdominal: Soft. Bowel sounds are normal. There is no tenderness. There is no rebound, no CVA tenderness, no tenderness at McBurney's point and negative Pham's sign.   Musculoskeletal: Normal range of motion. He exhibits edema (slightly improved BLE edema). He exhibits no tenderness.   Neurological: He is alert and oriented to person, place, and time.   Skin: Skin is warm and dry. Capillary refill takes less than 2 seconds. He is not diaphoretic.   Psychiatric: He has a normal mood and affect. His behavior is normal. Judgment and thought content normal.   Nursing note and vitals reviewed.      Significant Labs:   Bilirubin:   Recent Labs   Lab 04/17/19  1656 04/18/19  0425 04/19/19  0043 04/19/19  0420 04/20/19  0501   BILITOT 1.5* 1.5* 1.3* 1.2* 1.1*     CBC:   Recent Labs   Lab 04/19/19  0420 04/20/19  0501   WBC 4.87 4.83   HGB 8.2* 8.4*   HCT 26.8* 27.8*    192     CMP:   Recent Labs   Lab 04/19/19  0043 04/19/19  0420 04/20/19  0501    135* 135*   K 4.3 4.2 3.8   CL 97 97 97   CO2 29 28 26    112* 123*   BUN 52* 54* 53*   CREATININE 2.6* 2.6* 2.0*   CALCIUM 9.1 9.1 9.3   PROT 7.8 7.7 7.9   ALBUMIN 3.3* 3.3* 3.4*   BILITOT 1.3* 1.2* 1.1*   ALKPHOS 202* 198* 205*   AST 14 13 12   ALT 9* 7* 7*   ANIONGAP 11 10 12   EGFRNONAA 27.1* 27.1* 37.3*     Coagulation:   Recent Labs   Lab 04/20/19  0501   INR 4.2*     Magnesium:   Recent Labs   Lab  04/19/19  0043 04/19/19  0420 04/20/19  0501   MG 2.1 2.0 2.1       Significant Imaging: I have reviewed all pertinent imaging results/findings within the past 24 hours.    Assessment/Plan:      * Acute on chronic systolic congestive heart failure  NSVT  Patient presents with worsening LE and orthopnea for 3 weeks. His home Lasix 40mg BID has not helped him. He reports dry weight of 240lbs, currently 271. Given IVP 80mg in the ED with ~ 1L output.  on admission.  - TTE with EF 23%, diastolic dysfunction, severe biatrial enlargement, pulmonary hypertension, abnormal septal wall motion  - cardiology consulted for life vest vs. AICD placement given runs of vtach and reduced EF. Patient refusing intervention after long discussion  - given Lasix 80 IV TID, metolazone 2.5mg before  - output ~1700L  - Lasix, metolazone held in setting of JEAN-PIERRE likely from rapid diuresis, will get repeat BMP and CXR in am  - patient still with crackles on exam and BLE edema  - restart gentler diuresis once JEAN-PIERRE resolves  - repeat   - CXR with pulmonary edema  - telemetry  - not currently on BB, ARB, will start prior to discharge  - strict Is/Os, daily weights  - cardiac diet, 1.5L fluid restriction    Elevated alkaline phosphatase level  - elevated Alk phos on admission, 225--> 192--> 198  - small ascites noted on CT  - RUQ abdominal ultrasound without liver abnormality, no ascites noted, cholelithiasis vs. Gallbladder cyst  - will monitor    JEAN-PIERRE (acute kidney injury)  Cr 2-->2.3-->2.6--> 2  - combination of contrast induced nephropathy with prerenal JEAN-PIERRE due to over diuresis  - lasix, metolazone held  - FeUrea with prerenal JEAN-PIERRE  - started on IVF, but stopped with cardiology recommendation that oral hydration will resolve; given 250 cc bolus in setting of hypotension  - will restart gentler diuresis once resolved    Chronic kidney disease  Baseline Cr ~1.3  - daily BMP  - strict Is/Os  - now with JEAN-PIERRE, likely prerenal from  overdiuresis    Coronary artery disease  History of CVA    - continue daily ASA  - not on statin,  lipid panel significant for low HDL  - troponin 0.065--> .077-->.075, flat    Lumbar back pain  Patient complaining of shooting lumbar pain X 1 week. Denies preceding injury  - CT lumbar spine with lumbar spondylosis L5-S1 with mild posterior disc bulge  - PT/OT consulted, recommend HH  - incidental finding of small (1.8 cm) hypoattenuating lesion in the right kidney, not well characterized on this exam.  Neoplasia not excluded  -retroperitoneal ultrasound poorly visualized lesion secondary to poor sonographic penetration, will need surveilience    Essential hypertension  - not on HTN meds  - will start on Losartan once JEAN-PIERRE resolved    Atrial fibrillation, chronic  Chronic anticoagulation    - EKG with afib  - not on BB  - patient taking 6 mg BID, rather than 12 mg daily. Educated patient on proper use. Has not had INR checked since October  - INR 1.8 on admission, subtherapeutic  - continue coumadin, consult PharmD  - INR rising rapidly, INR 4.2, warfarin held. No signs of bleeding, bruising, melena, hematuria.   - Extensive discussion with patient on switching to DOAC, not interested in this time  - daily INR  - Will start on BB prior to discharge      VTE Risk Mitigation (From admission, onward)        Ordered     Place sequential compression device  Until discontinued      04/16/19 0124     IP VTE HIGH RISK PATIENT  Once      04/16/19 0124              Hazel Noriega PA-C  Department of Hospital Medicine   [facility]

## 2019-04-21 PROBLEM — R04.0 EPISTAXIS: Status: ACTIVE | Noted: 2019-04-21

## 2019-04-21 LAB
ABO + RH BLD: NORMAL
ALBUMIN SERPL BCP-MCNC: 3.4 G/DL (ref 3.5–5.2)
ALP SERPL-CCNC: 214 U/L (ref 55–135)
ALT SERPL W/O P-5'-P-CCNC: 8 U/L (ref 10–44)
ANION GAP SERPL CALC-SCNC: 10 MMOL/L (ref 8–16)
AST SERPL-CCNC: 15 U/L (ref 10–40)
BASOPHILS # BLD AUTO: 0.03 K/UL (ref 0–0.2)
BASOPHILS # BLD AUTO: 0.04 K/UL (ref 0–0.2)
BASOPHILS # BLD AUTO: 0.07 K/UL (ref 0–0.2)
BASOPHILS NFR BLD: 0.5 % (ref 0–1.9)
BASOPHILS NFR BLD: 0.7 % (ref 0–1.9)
BASOPHILS NFR BLD: 1.1 % (ref 0–1.9)
BILIRUB SERPL-MCNC: 1.1 MG/DL (ref 0.1–1)
BLD GP AB SCN CELLS X3 SERPL QL: NORMAL
BUN SERPL-MCNC: 52 MG/DL (ref 6–20)
CALCIUM SERPL-MCNC: 9.5 MG/DL (ref 8.7–10.5)
CHLORIDE SERPL-SCNC: 98 MMOL/L (ref 95–110)
CO2 SERPL-SCNC: 30 MMOL/L (ref 23–29)
CREAT SERPL-MCNC: 1.7 MG/DL (ref 0.5–1.4)
DIFFERENTIAL METHOD: ABNORMAL
EOSINOPHIL # BLD AUTO: 0.2 K/UL (ref 0–0.5)
EOSINOPHIL # BLD AUTO: 0.2 K/UL (ref 0–0.5)
EOSINOPHIL # BLD AUTO: 0.3 K/UL (ref 0–0.5)
EOSINOPHIL NFR BLD: 2.9 % (ref 0–8)
EOSINOPHIL NFR BLD: 4.5 % (ref 0–8)
EOSINOPHIL NFR BLD: 4.6 % (ref 0–8)
ERYTHROCYTE [DISTWIDTH] IN BLOOD BY AUTOMATED COUNT: 15.3 % (ref 11.5–14.5)
ERYTHROCYTE [DISTWIDTH] IN BLOOD BY AUTOMATED COUNT: 15.6 % (ref 11.5–14.5)
ERYTHROCYTE [DISTWIDTH] IN BLOOD BY AUTOMATED COUNT: 15.7 % (ref 11.5–14.5)
EST. GFR  (AFRICAN AMERICAN): 52.4 ML/MIN/1.73 M^2
EST. GFR  (NON AFRICAN AMERICAN): 45.4 ML/MIN/1.73 M^2
FERRITIN SERPL-MCNC: 53 NG/ML (ref 20–300)
FOLATE SERPL-MCNC: 6.3 NG/ML (ref 4–24)
GGT SERPL-CCNC: 185 U/L (ref 8–55)
GLUCOSE SERPL-MCNC: 140 MG/DL (ref 70–110)
HCT VFR BLD AUTO: 26.2 % (ref 40–54)
HCT VFR BLD AUTO: 26.7 % (ref 40–54)
HCT VFR BLD AUTO: 27.9 % (ref 40–54)
HGB BLD-MCNC: 8.1 G/DL (ref 14–18)
HGB BLD-MCNC: 8.2 G/DL (ref 14–18)
HGB BLD-MCNC: 8.8 G/DL (ref 14–18)
IMM GRANULOCYTES # BLD AUTO: 0.01 K/UL (ref 0–0.04)
IMM GRANULOCYTES # BLD AUTO: 0.01 K/UL (ref 0–0.04)
IMM GRANULOCYTES # BLD AUTO: 0.07 K/UL (ref 0–0.04)
IMM GRANULOCYTES NFR BLD AUTO: 0.2 % (ref 0–0.5)
IMM GRANULOCYTES NFR BLD AUTO: 0.2 % (ref 0–0.5)
IMM GRANULOCYTES NFR BLD AUTO: 1.1 % (ref 0–0.5)
INR PPP: 3.9 (ref 0.8–1.2)
IRON SERPL-MCNC: 23 UG/DL (ref 45–160)
LYMPHOCYTES # BLD AUTO: 0.8 K/UL (ref 1–4.8)
LYMPHOCYTES # BLD AUTO: 0.8 K/UL (ref 1–4.8)
LYMPHOCYTES # BLD AUTO: 0.9 K/UL (ref 1–4.8)
LYMPHOCYTES NFR BLD: 13.1 % (ref 18–48)
LYMPHOCYTES NFR BLD: 13.4 % (ref 18–48)
LYMPHOCYTES NFR BLD: 15.2 % (ref 18–48)
MAGNESIUM SERPL-MCNC: 2.2 MG/DL (ref 1.6–2.6)
MCH RBC QN AUTO: 26.1 PG (ref 27–31)
MCH RBC QN AUTO: 26.3 PG (ref 27–31)
MCH RBC QN AUTO: 26.5 PG (ref 27–31)
MCHC RBC AUTO-ENTMCNC: 30.7 G/DL (ref 32–36)
MCHC RBC AUTO-ENTMCNC: 30.9 G/DL (ref 32–36)
MCHC RBC AUTO-ENTMCNC: 31.5 G/DL (ref 32–36)
MCV RBC AUTO: 83 FL (ref 82–98)
MCV RBC AUTO: 85 FL (ref 82–98)
MCV RBC AUTO: 86 FL (ref 82–98)
MONOCYTES # BLD AUTO: 1.1 K/UL (ref 0.3–1)
MONOCYTES # BLD AUTO: 1.2 K/UL (ref 0.3–1)
MONOCYTES # BLD AUTO: 1.3 K/UL (ref 0.3–1)
MONOCYTES NFR BLD: 19.5 % (ref 4–15)
MONOCYTES NFR BLD: 20.4 % (ref 4–15)
MONOCYTES NFR BLD: 21 % (ref 4–15)
NEUTROPHILS # BLD AUTO: 3.2 K/UL (ref 1.8–7.7)
NEUTROPHILS # BLD AUTO: 3.4 K/UL (ref 1.8–7.7)
NEUTROPHILS # BLD AUTO: 4 K/UL (ref 1.8–7.7)
NEUTROPHILS NFR BLD: 59.9 % (ref 38–73)
NEUTROPHILS NFR BLD: 60.3 % (ref 38–73)
NEUTROPHILS NFR BLD: 61.4 % (ref 38–73)
NRBC BLD-RTO: 0 /100 WBC
PLATELET # BLD AUTO: 178 K/UL (ref 150–350)
PLATELET # BLD AUTO: 200 K/UL (ref 150–350)
PLATELET # BLD AUTO: 204 K/UL (ref 150–350)
PMV BLD AUTO: 10.3 FL (ref 9.2–12.9)
PMV BLD AUTO: 10.5 FL (ref 9.2–12.9)
PMV BLD AUTO: 10.7 FL (ref 9.2–12.9)
POTASSIUM SERPL-SCNC: 3.9 MMOL/L (ref 3.5–5.1)
PROT SERPL-MCNC: 7.8 G/DL (ref 6–8.4)
PROTHROMBIN TIME: 38.4 SEC (ref 9–12.5)
RBC # BLD AUTO: 3.1 M/UL (ref 4.6–6.2)
RBC # BLD AUTO: 3.1 M/UL (ref 4.6–6.2)
RBC # BLD AUTO: 3.35 M/UL (ref 4.6–6.2)
SATURATED IRON: 6 % (ref 20–50)
SODIUM SERPL-SCNC: 138 MMOL/L (ref 136–145)
TOTAL IRON BINDING CAPACITY: 411 UG/DL (ref 250–450)
TRANSFERRIN SERPL-MCNC: 278 MG/DL (ref 200–375)
VIT B12 SERPL-MCNC: 552 PG/ML (ref 210–950)
WBC # BLD AUTO: 5.39 K/UL (ref 3.9–12.7)
WBC # BLD AUTO: 5.67 K/UL (ref 3.9–12.7)
WBC # BLD AUTO: 6.47 K/UL (ref 3.9–12.7)

## 2019-04-21 PROCEDURE — 83735 ASSAY OF MAGNESIUM: CPT

## 2019-04-21 PROCEDURE — 80053 COMPREHEN METABOLIC PANEL: CPT

## 2019-04-21 PROCEDURE — 86901 BLOOD TYPING SEROLOGIC RH(D): CPT

## 2019-04-21 PROCEDURE — 20600001 HC STEP DOWN PRIVATE ROOM

## 2019-04-21 PROCEDURE — 82728 ASSAY OF FERRITIN: CPT

## 2019-04-21 PROCEDURE — 82746 ASSAY OF FOLIC ACID SERUM: CPT

## 2019-04-21 PROCEDURE — 99233 PR SUBSEQUENT HOSPITAL CARE,LEVL III: ICD-10-PCS | Mod: ,,, | Performed by: PHYSICIAN ASSISTANT

## 2019-04-21 PROCEDURE — 63600175 PHARM REV CODE 636 W HCPCS: Performed by: PHYSICIAN ASSISTANT

## 2019-04-21 PROCEDURE — 85610 PROTHROMBIN TIME: CPT

## 2019-04-21 PROCEDURE — 83921 ORGANIC ACID SINGLE QUANT: CPT

## 2019-04-21 PROCEDURE — 25000003 PHARM REV CODE 250: Performed by: PHYSICIAN ASSISTANT

## 2019-04-21 PROCEDURE — 83540 ASSAY OF IRON: CPT

## 2019-04-21 PROCEDURE — 99233 SBSQ HOSP IP/OBS HIGH 50: CPT | Mod: ,,, | Performed by: PHYSICIAN ASSISTANT

## 2019-04-21 PROCEDURE — 82607 VITAMIN B-12: CPT

## 2019-04-21 PROCEDURE — 85025 COMPLETE CBC W/AUTO DIFF WBC: CPT | Mod: 91

## 2019-04-21 PROCEDURE — 36415 COLL VENOUS BLD VENIPUNCTURE: CPT

## 2019-04-21 PROCEDURE — 82977 ASSAY OF GGT: CPT

## 2019-04-21 RX ORDER — FUROSEMIDE 80 MG/1
80 TABLET ORAL 2 TIMES DAILY
Status: DISCONTINUED | OUTPATIENT
Start: 2019-04-21 | End: 2019-04-23 | Stop reason: HOSPADM

## 2019-04-21 RX ORDER — OXYMETAZOLINE HCL 0.05 %
2 SPRAY, NON-AEROSOL (ML) NASAL 2 TIMES DAILY
Status: DISCONTINUED | OUTPATIENT
Start: 2019-04-21 | End: 2019-04-21

## 2019-04-21 RX ORDER — OXYMETAZOLINE HCL 0.05 %
3 SPRAY, NON-AEROSOL (ML) NASAL 2 TIMES DAILY
Status: DISCONTINUED | OUTPATIENT
Start: 2019-04-21 | End: 2019-04-23 | Stop reason: HOSPADM

## 2019-04-21 RX ORDER — FERROUS SULFATE 325(65) MG
325 TABLET, DELAYED RELEASE (ENTERIC COATED) ORAL DAILY
Status: DISCONTINUED | OUTPATIENT
Start: 2019-04-21 | End: 2019-04-23 | Stop reason: HOSPADM

## 2019-04-21 RX ADMIN — Medication 2 SPRAY: at 02:04

## 2019-04-21 RX ADMIN — LIDOCAINE 2 PATCH: 50 PATCH TOPICAL at 02:04

## 2019-04-21 RX ADMIN — HYDROMORPHONE HYDROCHLORIDE 0.2 MG: 1 INJECTION, SOLUTION INTRAMUSCULAR; INTRAVENOUS; SUBCUTANEOUS at 04:04

## 2019-04-21 RX ADMIN — FUROSEMIDE 80 MG: 80 TABLET ORAL at 02:04

## 2019-04-21 RX ADMIN — TIZANIDINE 4 MG: 4 TABLET ORAL at 02:04

## 2019-04-21 RX ADMIN — FERROUS SULFATE TAB EC 325 MG (65 MG FE EQUIVALENT) 325 MG: 325 (65 FE) TABLET DELAYED RESPONSE at 02:04

## 2019-04-21 RX ADMIN — TIZANIDINE 4 MG: 4 TABLET ORAL at 05:04

## 2019-04-21 RX ADMIN — HYDROXYZINE HYDROCHLORIDE 25 MG: 25 TABLET, FILM COATED ORAL at 09:04

## 2019-04-21 RX ADMIN — TIZANIDINE 4 MG: 4 TABLET ORAL at 09:04

## 2019-04-21 NOTE — PROGRESS NOTES
[facility] Hospital Medicine  Progress Note    Patient Name: Hamlet Terrell  MRN: [id number]  Patient Class: IP- Inpatient   Admission Date: 4/15/2019  Length of Stay: 3 days  Attending Physician: Joselyn Isabel MD  Primary Care Provider: Carlin Swift MD    [facility] Medicine Team: [facility] Hazel Noriega PA-C    Subjective:     Principal Problem:Acute on chronic systolic congestive heart failure    HPI:  Patient is 54 yo male with a PMHx of HTN, chronic systolic and diastolic heart failure, CVA, CAD, and MI being admitted to observation for acute CHF exacerbation. Patient reports chronic leg swelling that has worsened over the last 3 weeks. He denies shortness of breath, but endorses cough, orthopnea, and a 30lb weight gain. He states that his Lasix was increased to 40mg twice a day weeks ago and he has seen no improvement in his symptoms. He denies dietary indiscretions, but has not been adherent to a fluid restricted diet. He denies fevers, chills, chest pain, abdominal distension. He has chronic low back pain.    In the ED, vitals stable on room air. CXR with edema. Intake labs remarkable for , troponin 0.065, alkphos 225, tbili 2.3. He was given lasix 80 IVP and admitted to observation for further management.    Hospital Course:  Mr. Terrell was admitted to observation for CHF exacerbation. TTE with EF 23%, diastolic dysfunction, severe biatrial enlargement, pulmonary hypertension, abnormal septal wall motion. Pharmacy consulted for coumadin, INR subtherapeutic. Increased warfarin to 12 mg daily, patient was taking 6 mg BID, educated patient on correct use. Troponin trended flat. Patient complaining of lumbar back pain, CT lumbar spine with mild lumbar spondylosis L5-S1 with mild posterior disc bulge resulting in mild neural foraminal narrowing. Incidental finding of indeterminate hypodense lesion in the right kidney. Retroperitoneal ultrasound poorly visualized the  lesion, will need continued monitored survelience. Cardiology consulted for evaluation of life vest vs. AICD for patient given runs of vtach and poor EF. Patient refused AICD/ life vest. Cr 2-->2.3, lasix held --> 2.6--> 2. INR supratherapeutic, warfarin held.    Interval History: Patient complaining of urticaria, likely due to elevated Tbili. H/H 8.1, anemia work up significant for low Fe, B12 <600, MMA pending. Cr improved to 1.7, restart home dose of Lasix 80 mg BID PO today. INR 3.9, warfarin held. Nurse called, patient with nosebleed, bleeding stopped with pressure. Oxymetazoline BID. Patient now with dizziness, getting serial H/Hs. Patient currently in Afib. Will monitor.    Review of Systems   Constitutional: Negative for chills and fever.   HENT: Positive for nosebleeds. Negative for congestion and rhinorrhea.    Respiratory: Positive for shortness of breath. Negative for cough, chest tightness and wheezing.    Cardiovascular: Positive for leg swelling. Negative for chest pain and palpitations.   Gastrointestinal: Negative for abdominal pain, constipation, diarrhea and vomiting.   Genitourinary: Negative for dysuria and hematuria.   Musculoskeletal: Positive for back pain and gait problem. Negative for myalgias.   Skin: Negative for color change, pallor and wound.        urticaria   Neurological: Negative for dizziness and seizures.   Psychiatric/Behavioral: Negative for agitation and behavioral problems.     Objective:     Vital Signs (Most Recent):  Temp: 98 °F (36.7 °C) (04/21/19 1117)  Pulse: 72 (04/21/19 1117)  Resp: 18 (04/21/19 1117)  BP: 115/61 (04/21/19 1117)  SpO2: (!) 94 % (04/21/19 1117) Vital Signs (24h Range):  Temp:  [97.5 °F (36.4 °C)-98.6 °F (37 °C)] 98 °F (36.7 °C)  Pulse:  [60-82] 72  Resp:  [18] 18  SpO2:  [93 %-100 %] 94 %  BP: (115-142)/(61-76) 115/61     Weight: 127.5 kg (281 lb 1.4 oz)  Body mass index is 41.51 kg/m².    Intake/Output Summary (Last 24 hours) at 4/21/2019 0449  Last  data filed at 4/21/2019 0423  Gross per 24 hour   Intake 840 ml   Output 700 ml   Net 140 ml      Physical Exam   Constitutional: He is oriented to person, place, and time. He appears well-developed and well-nourished. No distress.   HENT:   Head: Normocephalic and atraumatic.   Nose: Epistaxis is observed.   Eyes: Pupils are equal, round, and reactive to light. EOM are normal.   Neck: Normal range of motion. Neck supple.   Cardiovascular: Normal rate and intact distal pulses. An irregularly irregular rhythm present.   No murmur heard.  Pulmonary/Chest: Effort normal. He has no wheezes. He has rales. He exhibits no tenderness.   Abdominal: Soft. Bowel sounds are normal. There is no tenderness. There is no rebound, no CVA tenderness, no tenderness at McBurney's point and negative Pham's sign.   Musculoskeletal: Normal range of motion. He exhibits edema (slightly improved BLE edema). He exhibits no tenderness.   Neurological: He is alert and oriented to person, place, and time.   Skin: Skin is warm and dry. Capillary refill takes less than 2 seconds. He is not diaphoretic.   Psychiatric: He has a normal mood and affect. His behavior is normal. Judgment and thought content normal.   Nursing note and vitals reviewed.      Significant Labs:   Bilirubin:   Recent Labs   Lab 04/18/19  0425 04/19/19  0043 04/19/19  0420 04/20/19  0501 04/21/19  0349   BILITOT 1.5* 1.3* 1.2* 1.1* 1.1*     CBC:   Recent Labs   Lab 04/20/19  0501 04/21/19  0349   WBC 4.83 5.67   HGB 8.4* 8.1*   HCT 27.8* 26.2*    200     CMP:   Recent Labs   Lab 04/20/19  0501 04/21/19  0349   * 138   K 3.8 3.9   CL 97 98   CO2 26 30*   * 140*   BUN 53* 52*   CREATININE 2.0* 1.7*   CALCIUM 9.3 9.5   PROT 7.9 7.8   ALBUMIN 3.4* 3.4*   BILITOT 1.1* 1.1*   ALKPHOS 205* 214*   AST 12 15   ALT 7* 8*   ANIONGAP 12 10   EGFRNONAA 37.3* 45.4*     Coagulation:   Recent Labs   Lab 04/21/19  0349   INR 3.9*     Magnesium:   Recent Labs   Lab  04/20/19  0501 04/21/19  0349   MG 2.1 2.2       Significant Imaging: I have reviewed all pertinent imaging results/findings within the past 24 hours.    Assessment/Plan:      * Acute on chronic systolic congestive heart failure  NSVT  Patient presents with worsening LE and orthopnea for 3 weeks. His home Lasix 40mg BID has not helped him. He reports dry weight of 240lbs, currently 271. Given IVP 80mg in the ED with ~ 1L output.  on admission.  - TTE with EF 23%, diastolic dysfunction, severe biatrial enlargement, pulmonary hypertension, abnormal septal wall motion  - cardiology consulted for life vest vs. AICD placement given runs of vtach and reduced EF. Patient refusing intervention after long discussion  - given Lasix 80 IV TID, metolazone 2.5mg before  - output ~1700L  - Lasix, metolazone held in setting of JEAN-PIERRE likely from rapid diuresis, will get repeat BMP and CXR in am  - patient still with crackles on exam and BLE edema  - Cr improved to 1.7, restart home Lasix 80 mg BID PO  - repeat   - CXR with pulmonary edema  - telemetry  - not currently on BB, ARB, will start prior to discharge  - strict Is/Os, daily weights  - cardiac diet, 1.5L fluid restriction    Epistaxis  Elevated INR  - bleeding stopped  - afrin nasal spray BID  - monitor      Elevated alkaline phosphatase level  - elevated Alk phos on admission, 225--> 192--> 198--> 205--> 214  - small ascites noted on CT  - RUQ abdominal ultrasound without liver abnormality, no ascites noted, cholelithiasis vs. Gallbladder cyst  - GGT pending    Normocytic anemia  H/H 8.1/26.2  - Fe and saturated Fe low, B12 552, MMA pending  - now with epistaxis from supratherapeutic INR, serial H/H  - started on ferrous sulfate 325 mg daily   - will consent for blood, type and screen- no need for transfusion at this time    JEAN-PIERRE (acute kidney injury)  Resolving  Cr 2-->2.3-->2.6--> 2--> 1.7  - combination of contrast induced nephropathy with prerenal JEAN-PIERRE due to  over diuresis  - FeUrea with prerenal JEAN-PIERRE  - started on IVF, but stopped with cardiology recommendation that oral hydration will resolve; given 250 cc bolus in setting of hypotension  - restart Lasix 80 mg BID PO    Chronic kidney disease  Baseline Cr ~1.3  - daily BMP  - strict Is/Os  - now with JEAN-PIERRE, likely prerenal from overdiuresis    Coronary artery disease  History of CVA    - ASA held in setting of epistaxis  - not on statin,  lipid panel significant for low HDL  - troponin 0.065--> .077-->.075, flat    Lumbar back pain  Patient complaining of shooting lumbar pain X 1 week. Denies preceding injury  - CT lumbar spine with lumbar spondylosis L5-S1 with mild posterior disc bulge  - PT/OT consulted, recommend HH  - incidental finding of small (1.8 cm) hypoattenuating lesion in the right kidney, not well characterized on this exam.  Neoplasia not excluded  -retroperitoneal ultrasound poorly visualized lesion secondary to poor sonographic penetration, will need surveilience    Essential hypertension  - not on HTN meds  - will start on Losartan once JEAN-PIERRE resolved    Atrial fibrillation, chronic  Chronic anticoagulation    - EKG with afib  - not on BB  - patient taking 6 mg BID, rather than 12 mg daily. Educated patient on proper use. Has not had INR checked since October  - INR 1.8 on admission, subtherapeutic  - continue coumadin, consult PharmD  - INR rising rapidly, INR 4.2, warfarin held--> 3.9, continue to hold  - patient with epistaxis, bleeding controlled with pressure, afrin spray BID  - serial H/Hs, will monitor  - Extensive discussion with patient on switching to DOAC, not interested in this time  - daily INR  - Will start on BB prior to discharge      VTE Risk Mitigation (From admission, onward)        Ordered     Place sequential compression device  Until discontinued      04/16/19 0124     IP VTE HIGH RISK PATIENT  Once      04/16/19 0124              Hazel Noriega PA-C  Department of Hospital  Medicine   [facility]

## 2019-04-21 NOTE — ASSESSMENT & PLAN NOTE
- elevated Alk phos on admission, 225--> 192--> 198--> 205--> 214  - small ascites noted on CT  - RUQ abdominal ultrasound without liver abnormality, no ascites noted, cholelithiasis vs. Gallbladder cyst  - GGT pending

## 2019-04-21 NOTE — ASSESSMENT & PLAN NOTE
H/H 8.1/26.2  - Fe and saturated Fe low, B12 552, MMA pending  - now with epistaxis from supratherapeutic INR, serial H/H  - started on ferrous sulfate 325 mg daily   - will consent for blood, type and screen- no need for transfusion at this time

## 2019-04-21 NOTE — SUBJECTIVE & OBJECTIVE
Interval History: Patient complaining of urticaria, likely due to elevated Tbili. H/H 8.1, anemia work up significant for low Fe, B12 <600, MMA pending. Cr improved to 1.7, restart home dose of Lasix 80 mg BID PO today. INR 3.9, warfarin held. Nurse called, patient with nosebleed, bleeding stopped with pressure. Oxymetazoline BID. Patient now with dizziness, getting serial H/Hs. Patient currently in Afib. Will monitor.    Review of Systems   Constitutional: Negative for chills and fever.   HENT: Positive for nosebleeds. Negative for congestion and rhinorrhea.    Respiratory: Positive for shortness of breath. Negative for cough, chest tightness and wheezing.    Cardiovascular: Positive for leg swelling. Negative for chest pain and palpitations.   Gastrointestinal: Negative for abdominal pain, constipation, diarrhea and vomiting.   Genitourinary: Negative for dysuria and hematuria.   Musculoskeletal: Positive for back pain and gait problem. Negative for myalgias.   Skin: Negative for color change, pallor and wound.        urticaria   Neurological: Negative for dizziness and seizures.   Psychiatric/Behavioral: Negative for agitation and behavioral problems.     Objective:     Vital Signs (Most Recent):  Temp: 98 °F (36.7 °C) (04/21/19 1117)  Pulse: 72 (04/21/19 1117)  Resp: 18 (04/21/19 1117)  BP: 115/61 (04/21/19 1117)  SpO2: (!) 94 % (04/21/19 1117) Vital Signs (24h Range):  Temp:  [97.5 °F (36.4 °C)-98.6 °F (37 °C)] 98 °F (36.7 °C)  Pulse:  [60-82] 72  Resp:  [18] 18  SpO2:  [93 %-100 %] 94 %  BP: (115-142)/(61-76) 115/61     Weight: 127.5 kg (281 lb 1.4 oz)  Body mass index is 41.51 kg/m².    Intake/Output Summary (Last 24 hours) at 4/21/2019 1335  Last data filed at 4/21/2019 0423  Gross per 24 hour   Intake 840 ml   Output 700 ml   Net 140 ml      Physical Exam   Constitutional: He is oriented to person, place, and time. He appears well-developed and well-nourished. No distress.   HENT:   Head: Normocephalic and  atraumatic.   Nose: Epistaxis is observed.   Eyes: Pupils are equal, round, and reactive to light. EOM are normal.   Neck: Normal range of motion. Neck supple.   Cardiovascular: Normal rate and intact distal pulses. An irregularly irregular rhythm present.   No murmur heard.  Pulmonary/Chest: Effort normal. He has no wheezes. He has rales. He exhibits no tenderness.   Abdominal: Soft. Bowel sounds are normal. There is no tenderness. There is no rebound, no CVA tenderness, no tenderness at McBurney's point and negative Pham's sign.   Musculoskeletal: Normal range of motion. He exhibits edema (slightly improved BLE edema). He exhibits no tenderness.   Neurological: He is alert and oriented to person, place, and time.   Skin: Skin is warm and dry. Capillary refill takes less than 2 seconds. He is not diaphoretic.   Psychiatric: He has a normal mood and affect. His behavior is normal. Judgment and thought content normal.   Nursing note and vitals reviewed.      Significant Labs:   Bilirubin:   Recent Labs   Lab 04/18/19  0425 04/19/19  0043 04/19/19  0420 04/20/19  0501 04/21/19  0349   BILITOT 1.5* 1.3* 1.2* 1.1* 1.1*     CBC:   Recent Labs   Lab 04/20/19  0501 04/21/19  0349   WBC 4.83 5.67   HGB 8.4* 8.1*   HCT 27.8* 26.2*    200     CMP:   Recent Labs   Lab 04/20/19  0501 04/21/19  0349   * 138   K 3.8 3.9   CL 97 98   CO2 26 30*   * 140*   BUN 53* 52*   CREATININE 2.0* 1.7*   CALCIUM 9.3 9.5   PROT 7.9 7.8   ALBUMIN 3.4* 3.4*   BILITOT 1.1* 1.1*   ALKPHOS 205* 214*   AST 12 15   ALT 7* 8*   ANIONGAP 12 10   EGFRNONAA 37.3* 45.4*     Coagulation:   Recent Labs   Lab 04/21/19  0349   INR 3.9*     Magnesium:   Recent Labs   Lab 04/20/19  0501 04/21/19  0349   MG 2.1 2.2       Significant Imaging: I have reviewed all pertinent imaging results/findings within the past 24 hours.

## 2019-04-21 NOTE — PLAN OF CARE
Problem: Adult Inpatient Plan of Care  Goal: Plan of Care Review  Outcome: Ongoing (interventions implemented as appropriate)     04/21/19 0312   Plan of Care Review   Plan of Care Reviewed With patient   Progress no change   Pt AAOx4 with flat affect. No acute events overnight. Pain meds given prn. Afib on tele, MD aware. Vitals stable. Ambulates to the bathroom with stand by assist. Pt insisted that his legs be washed before the ammonion lactate lotion is applied, Tech washed pt legs but pt refused lotion. SCD applied. Safety maintained. Will continue to monitor.

## 2019-04-21 NOTE — ASSESSMENT & PLAN NOTE
History of CVA    - ASA held in setting of epistaxis  - not on statin,  lipid panel significant for low HDL  - troponin 0.065--> .077-->.075, flat

## 2019-04-21 NOTE — ASSESSMENT & PLAN NOTE
Resolving  Cr 2-->2.3-->2.6--> 2--> 1.7  - combination of contrast induced nephropathy with prerenal JEAN-PIERRE due to over diuresis  - FeUrea with prerenal JEAN-PIERRE  - started on IVF, but stopped with cardiology recommendation that oral hydration will resolve; given 250 cc bolus in setting of hypotension  - restart Lasix 80 mg BID PO

## 2019-04-21 NOTE — ASSESSMENT & PLAN NOTE
Chronic anticoagulation    - EKG with afib  - not on BB  - patient taking 6 mg BID, rather than 12 mg daily. Educated patient on proper use. Has not had INR checked since October  - INR 1.8 on admission, subtherapeutic  - continue coumadin, consult PharmD  - INR rising rapidly, INR 4.2, warfarin held--> 3.9, continue to hold  - patient with epistaxis, bleeding controlled with pressure, afrin spray BID  - serial H/Hs, will monitor  - Extensive discussion with patient on switching to DOAC, not interested in this time  - daily INR  - Will start on BB prior to discharge

## 2019-04-21 NOTE — ASSESSMENT & PLAN NOTE
NSVT  Patient presents with worsening LE and orthopnea for 3 weeks. His home Lasix 40mg BID has not helped him. He reports dry weight of 240lbs, currently 271. Given IVP 80mg in the ED with ~ 1L output.  on admission.  - TTE with EF 23%, diastolic dysfunction, severe biatrial enlargement, pulmonary hypertension, abnormal septal wall motion  - cardiology consulted for life vest vs. AICD placement given runs of vtach and reduced EF. Patient refusing intervention after long discussion  - given Lasix 80 IV TID, metolazone 2.5mg before  - output ~1700L  - Lasix, metolazone held in setting of JEAN-PIERRE likely from rapid diuresis, will get repeat BMP and CXR in am  - patient still with crackles on exam and BLE edema  - Cr improved to 1.7, restart home Lasix 80 mg BID PO  - repeat   - CXR with pulmonary edema  - telemetry  - not currently on BB, ARB, will start prior to discharge  - strict Is/Os, daily weights  - cardiac diet, 1.5L fluid restriction

## 2019-04-22 PROBLEM — R79.1 ELEVATED INR: Status: ACTIVE | Noted: 2019-04-22

## 2019-04-22 PROBLEM — I50.43 ACUTE ON CHRONIC COMBINED SYSTOLIC (CONGESTIVE) AND DIASTOLIC (CONGESTIVE) HEART FAILURE: Status: ACTIVE | Noted: 2019-04-22

## 2019-04-22 LAB
ALBUMIN SERPL BCP-MCNC: 3.6 G/DL (ref 3.5–5.2)
ALP SERPL-CCNC: 226 U/L (ref 55–135)
ALT SERPL W/O P-5'-P-CCNC: 10 U/L (ref 10–44)
ANION GAP SERPL CALC-SCNC: 8 MMOL/L (ref 8–16)
AST SERPL-CCNC: 16 U/L (ref 10–40)
BASOPHILS # BLD AUTO: 0.04 K/UL (ref 0–0.2)
BASOPHILS # BLD AUTO: 0.05 K/UL (ref 0–0.2)
BASOPHILS NFR BLD: 0.7 % (ref 0–1.9)
BASOPHILS NFR BLD: 0.9 % (ref 0–1.9)
BILIRUB SERPL-MCNC: 1.5 MG/DL (ref 0.1–1)
BUN SERPL-MCNC: 48 MG/DL (ref 6–20)
CALCIUM SERPL-MCNC: 9.8 MG/DL (ref 8.7–10.5)
CHLORIDE SERPL-SCNC: 95 MMOL/L (ref 95–110)
CO2 SERPL-SCNC: 34 MMOL/L (ref 23–29)
CREAT SERPL-MCNC: 1.6 MG/DL (ref 0.5–1.4)
DIFFERENTIAL METHOD: ABNORMAL
DIFFERENTIAL METHOD: ABNORMAL
EOSINOPHIL # BLD AUTO: 0.3 K/UL (ref 0–0.5)
EOSINOPHIL # BLD AUTO: 0.3 K/UL (ref 0–0.5)
EOSINOPHIL NFR BLD: 5.1 % (ref 0–8)
EOSINOPHIL NFR BLD: 5.5 % (ref 0–8)
ERYTHROCYTE [DISTWIDTH] IN BLOOD BY AUTOMATED COUNT: 15.3 % (ref 11.5–14.5)
ERYTHROCYTE [DISTWIDTH] IN BLOOD BY AUTOMATED COUNT: 15.4 % (ref 11.5–14.5)
EST. GFR  (AFRICAN AMERICAN): 56.4 ML/MIN/1.73 M^2
EST. GFR  (NON AFRICAN AMERICAN): 48.8 ML/MIN/1.73 M^2
GLUCOSE SERPL-MCNC: 122 MG/DL (ref 70–110)
HCT VFR BLD AUTO: 27.7 % (ref 40–54)
HCT VFR BLD AUTO: 27.8 % (ref 40–54)
HGB BLD-MCNC: 8.5 G/DL (ref 14–18)
HGB BLD-MCNC: 8.5 G/DL (ref 14–18)
IMM GRANULOCYTES # BLD AUTO: 0.02 K/UL (ref 0–0.04)
IMM GRANULOCYTES # BLD AUTO: 0.02 K/UL (ref 0–0.04)
IMM GRANULOCYTES NFR BLD AUTO: 0.4 % (ref 0–0.5)
IMM GRANULOCYTES NFR BLD AUTO: 0.4 % (ref 0–0.5)
INR PPP: 2.8 (ref 0.8–1.2)
LYMPHOCYTES # BLD AUTO: 0.9 K/UL (ref 1–4.8)
LYMPHOCYTES # BLD AUTO: 1 K/UL (ref 1–4.8)
LYMPHOCYTES NFR BLD: 16.3 % (ref 18–48)
LYMPHOCYTES NFR BLD: 16.8 % (ref 18–48)
MAGNESIUM SERPL-MCNC: 2.2 MG/DL (ref 1.6–2.6)
MCH RBC QN AUTO: 25.9 PG (ref 27–31)
MCH RBC QN AUTO: 25.9 PG (ref 27–31)
MCHC RBC AUTO-ENTMCNC: 30.6 G/DL (ref 32–36)
MCHC RBC AUTO-ENTMCNC: 30.7 G/DL (ref 32–36)
MCV RBC AUTO: 85 FL (ref 82–98)
MCV RBC AUTO: 85 FL (ref 82–98)
MONOCYTES # BLD AUTO: 1.1 K/UL (ref 0.3–1)
MONOCYTES # BLD AUTO: 1.1 K/UL (ref 0.3–1)
MONOCYTES NFR BLD: 18.9 % (ref 4–15)
MONOCYTES NFR BLD: 20.2 % (ref 4–15)
NEUTROPHILS # BLD AUTO: 3.2 K/UL (ref 1.8–7.7)
NEUTROPHILS # BLD AUTO: 3.3 K/UL (ref 1.8–7.7)
NEUTROPHILS NFR BLD: 56.7 % (ref 38–73)
NEUTROPHILS NFR BLD: 58.1 % (ref 38–73)
NRBC BLD-RTO: 0 /100 WBC
NRBC BLD-RTO: 0 /100 WBC
PLATELET # BLD AUTO: 210 K/UL (ref 150–350)
PLATELET # BLD AUTO: 212 K/UL (ref 150–350)
PMV BLD AUTO: 10.6 FL (ref 9.2–12.9)
PMV BLD AUTO: 10.8 FL (ref 9.2–12.9)
POTASSIUM SERPL-SCNC: 3.6 MMOL/L (ref 3.5–5.1)
PROT SERPL-MCNC: 8.3 G/DL (ref 6–8.4)
PROTHROMBIN TIME: 27.5 SEC (ref 9–12.5)
RBC # BLD AUTO: 3.28 M/UL (ref 4.6–6.2)
RBC # BLD AUTO: 3.28 M/UL (ref 4.6–6.2)
SODIUM SERPL-SCNC: 137 MMOL/L (ref 136–145)
WBC # BLD AUTO: 5.59 K/UL (ref 3.9–12.7)
WBC # BLD AUTO: 5.66 K/UL (ref 3.9–12.7)

## 2019-04-22 PROCEDURE — 25000003 PHARM REV CODE 250: Performed by: PHYSICIAN ASSISTANT

## 2019-04-22 PROCEDURE — 99233 SBSQ HOSP IP/OBS HIGH 50: CPT | Mod: ,,, | Performed by: PHYSICIAN ASSISTANT

## 2019-04-22 PROCEDURE — 25000242 PHARM REV CODE 250 ALT 637 W/ HCPCS: Performed by: PHYSICIAN ASSISTANT

## 2019-04-22 PROCEDURE — 20600001 HC STEP DOWN PRIVATE ROOM

## 2019-04-22 PROCEDURE — 83735 ASSAY OF MAGNESIUM: CPT

## 2019-04-22 PROCEDURE — 99233 PR SUBSEQUENT HOSPITAL CARE,LEVL III: ICD-10-PCS | Mod: ,,, | Performed by: PHYSICIAN ASSISTANT

## 2019-04-22 PROCEDURE — 85610 PROTHROMBIN TIME: CPT

## 2019-04-22 PROCEDURE — 97161 PT EVAL LOW COMPLEX 20 MIN: CPT

## 2019-04-22 PROCEDURE — 80053 COMPREHEN METABOLIC PANEL: CPT

## 2019-04-22 PROCEDURE — 85025 COMPLETE CBC W/AUTO DIFF WBC: CPT

## 2019-04-22 PROCEDURE — 63600175 PHARM REV CODE 636 W HCPCS: Performed by: PHYSICIAN ASSISTANT

## 2019-04-22 PROCEDURE — 36415 COLL VENOUS BLD VENIPUNCTURE: CPT

## 2019-04-22 RX ORDER — LOSARTAN POTASSIUM 25 MG/1
12.5 TABLET ORAL DAILY
Qty: 45 TABLET | Refills: 3 | Status: ON HOLD | OUTPATIENT
Start: 2019-04-22 | End: 2019-07-17 | Stop reason: HOSPADM

## 2019-04-22 RX ORDER — WARFARIN 6 MG/1
6 TABLET ORAL DAILY
Qty: 30 TABLET | Refills: 3 | Status: ON HOLD | OUTPATIENT
Start: 2019-04-22 | End: 2019-07-17 | Stop reason: HOSPADM

## 2019-04-22 RX ORDER — IRBESARTAN 75 MG/1
75 TABLET ORAL DAILY
Status: DISCONTINUED | OUTPATIENT
Start: 2019-04-22 | End: 2019-04-22

## 2019-04-22 RX ORDER — TIZANIDINE 4 MG/1
4 TABLET ORAL EVERY 8 HOURS
Qty: 30 TABLET | Refills: 0 | Status: SHIPPED | OUTPATIENT
Start: 2019-04-22 | End: 2019-05-03

## 2019-04-22 RX ORDER — FERROUS SULFATE 325(65) MG
325 TABLET, DELAYED RELEASE (ENTERIC COATED) ORAL DAILY
Refills: 0 | Status: ON HOLD | COMMUNITY
Start: 2019-04-22 | End: 2019-12-06

## 2019-04-22 RX ORDER — HYDROXYZINE PAMOATE 25 MG/1
25 CAPSULE ORAL EVERY 4 HOURS PRN
Status: DISCONTINUED | OUTPATIENT
Start: 2019-04-22 | End: 2019-04-23 | Stop reason: HOSPADM

## 2019-04-22 RX ADMIN — LIDOCAINE 2 PATCH: 50 PATCH TOPICAL at 02:04

## 2019-04-22 RX ADMIN — FUROSEMIDE 80 MG: 80 TABLET ORAL at 05:04

## 2019-04-22 RX ADMIN — ALBUTEROL SULFATE 2 PUFF: 90 AEROSOL, METERED RESPIRATORY (INHALATION) at 09:04

## 2019-04-22 RX ADMIN — HYDROMORPHONE HYDROCHLORIDE 0.2 MG: 1 INJECTION, SOLUTION INTRAMUSCULAR; INTRAVENOUS; SUBCUTANEOUS at 03:04

## 2019-04-22 RX ADMIN — HYDROMORPHONE HYDROCHLORIDE 0.2 MG: 1 INJECTION, SOLUTION INTRAMUSCULAR; INTRAVENOUS; SUBCUTANEOUS at 02:04

## 2019-04-22 RX ADMIN — TIZANIDINE 4 MG: 4 TABLET ORAL at 06:04

## 2019-04-22 RX ADMIN — DIPHENHYDRAMINE HYDROCHLORIDE 50 MG: 25 CAPSULE ORAL at 02:04

## 2019-04-22 RX ADMIN — FUROSEMIDE 80 MG: 80 TABLET ORAL at 09:04

## 2019-04-22 RX ADMIN — TIZANIDINE 4 MG: 4 TABLET ORAL at 09:04

## 2019-04-22 RX ADMIN — HYDROMORPHONE HYDROCHLORIDE 0.2 MG: 1 INJECTION, SOLUTION INTRAMUSCULAR; INTRAVENOUS; SUBCUTANEOUS at 10:04

## 2019-04-22 RX ADMIN — FERROUS SULFATE TAB EC 325 MG (65 MG FE EQUIVALENT) 325 MG: 325 (65 FE) TABLET DELAYED RESPONSE at 09:04

## 2019-04-22 RX ADMIN — WARFARIN SODIUM 6 MG: 5 TABLET ORAL at 05:04

## 2019-04-22 RX ADMIN — HYDROXYZINE PAMOATE 25 MG: 25 CAPSULE ORAL at 09:04

## 2019-04-22 RX ADMIN — AMMONIUM LACTATE: 12 LOTION TOPICAL at 09:04

## 2019-04-22 RX ADMIN — HYDROXYZINE PAMOATE 25 MG: 25 CAPSULE ORAL at 12:04

## 2019-04-22 RX ADMIN — LOSARTAN POTASSIUM 12.5 MG: 25 TABLET, FILM COATED ORAL at 09:04

## 2019-04-22 RX ADMIN — TIZANIDINE 4 MG: 4 TABLET ORAL at 02:04

## 2019-04-22 RX ADMIN — ASPIRIN 81 MG: 81 TABLET, COATED ORAL at 09:04

## 2019-04-22 NOTE — PLAN OF CARE
Problem: Adult Inpatient Plan of Care  Goal: Plan of Care Review  Patient has uneventful shift with discharge held until home health/rehab is arranged. Patient does not feel comfortable returning home without this services with doctor notified.No orders given to hold discharge until orders of rehab/home health are completed. IV site intact without any redness or swelling noted.Patient AAOx4 with no other issues at this time. Safety maintained, call light in reach with siderails up x 3.

## 2019-04-22 NOTE — PLAN OF CARE
"CM was informed by MODESTA Avila ([id number]) that the patient does not want to be admitted to an in-pt rehab but that [facility] can provide home health care following discharge. Patient awake & alert sitting in the recliner with PT at the bedside when CM rounded. Per PT, patient ambulated 20 ft with a RW this afternoon. PT recommended a RW for home use at time of discharge. CM informed the patient that [facility] will be able to provide HH care following discharge. Patient verbalized understanding & agreement with above.    CM questioned patient regarding the lab where he has his coumadin levels drawn. Patient unable to provide needed information. CM contacted Dr. Swift's office to obtain info. CM was informed by Daniela ([phone]) at Dr. Swift's office that the patient is supposed to have his coumadin levels drawn every other week at Dr. Swift's office but that he hasn't had been drawn since October 2018 & that the patient has not been seen by Dr. Swift since November 2018. Daniela stated that the patient is a repeated "no show" even though the PCP's office is a "walk-in" clinic & is only a 7 minute car ride from the patient's house. CM questioned the patient regarding above. Patient stated that he does not have transportation & is not willing to pay for a taxi. CM stressed to the patient that it is vital that he have his coumadin levels monitored while taking coumadin & stressed the risk of severe bleeding if he would fall. CM reminded the patient that his coumadin dose was held yesterday due to his INR being 4.6 while hospitalized. CM informed the patient of alternate anticoagulants that are available. Patient stated that he does not want to take any other anticoagulants but refused to give a reason. CM informed AYESHA Noriega ([id number]) of above. Will continue to follow.   "

## 2019-04-22 NOTE — NURSING
MD Isabel and PA phoned regarding patients runs of VTach at times. Doctors are aware but giving current update of readings reoccurring.

## 2019-04-22 NOTE — PLAN OF CARE
Problem: Adult Inpatient Plan of Care  Goal: Plan of Care Review  Outcome: Ongoing (interventions implemented as appropriate)     04/22/19 6003   Plan of Care Review   Plan of Care Reviewed With patient   Progress no change   POC reviewed with Pt. AAOx4. C/o pain all over and itching, PRN meds given with minimal relief. Stated he hasn't slept in days, refused to lay in bed, preferred to sleep in chair. Pt had 1 episode of epistaxis during day shift, no sign of bleeding overnight, pt refused nasal spray. Vitals stable. Continued to run Afib on tele. SCD's in place. Safety maintained. Will continue to monitor.

## 2019-04-22 NOTE — PLAN OF CARE
[facility]    HOME HEALTH ORDERS  FACE TO FACE ENCOUNTER    Patient Name: Hamlet Terrell  YOB: 1965    PCP: Cariln Swift MD   PCP Address: [facility] [address]  PCP Phone Number: [phone]  PCP Fax: [phone]    Encounter Date: 04/22/2019    Admit to Home Health    Diagnoses:  Active Hospital Problems    Diagnosis  POA    *Acute on chronic systolic congestive heart failure [I50.23]  Yes    Epistaxis [R04.0]  No    Elevated alkaline phosphatase level [R74.8]  Yes    Normocytic anemia [D64.9]  Yes    JEAN-PIERRE (acute kidney injury) [N17.9]  Yes    Chronic kidney disease [N18.9]  Yes     Chronic    Coronary artery disease [I25.10]  Yes     Chronic    Edema [R60.9]  Yes    V-tach [I47.2]  Unknown    Lumbar back pain [M54.5]  Yes    Atrial fibrillation, chronic [I48.2]  Yes     Chronic    Essential hypertension [I10]  Yes     Chronic    Chronic anticoagulation [Z79.01]  Not Applicable     Chronic    History of CVA (cerebrovascular accident) [Z86.73]  Not Applicable     Personal account, occurred in 2008/2009 at [facility].        Resolved Hospital Problems    Diagnosis Date Resolved POA    Elevated INR [R79.1] 04/05/2017 Yes       No future appointments.  Follow-up Information     Carlin Swift MD On 4/19/2019.    Specialty:  Family Medicine  Why:  Go to walk-in clinic 1-2 weeks following discharge for a hospital follow up appointment.   Contact information:  Jaylen MINA MICHELLE [facility]  ANAID IBARRA  [facility] [id number] [phone]                     I have seen and examined this patient face to face today. My clinical findings that support the need for the home health skilled services and home bound status are the following:  Weakness/numbness causing balance and gait disturbance due to Weakness/Debility making it taxing to leave home.    Allergies:  Review of patient's allergies indicates:   Allergen Reactions    Acetaminophen       Itching    Oxycodone-acetaminophen      Other reaction(s): Itching    Ace inhibitors Other (See Comments)     cough       Diet: cardiac diet    Activities: activity as tolerated    Nursing:   SN to complete comprehensive assessment including routine vital signs. Instruct on disease process and s/s of complications to report to MD. Review/verify medication list sent home with the patient at time of discharge  and instruct patient/caregiver as needed. Frequency may be adjusted depending on start of care date.    Notify MD if SBP > 160 or < 90; DBP > 90 or < 50; HR > 120 or < 50; Temp > 101; Other:         CONSULTS:    Physical Therapy to evaluate and treat. Evaluate for home safety and equipment needs; Establish/upgrade home exercise program. Perform / instruct on therapeutic exercises, gait training, transfer training, and Range of Motion.  Occupational Therapy to evaluate and treat. Evaluate home environment for safety and equipment needs. Perform/Instruct on transfers, ADL training, ROM, and therapeutic exercises.   to evaluate for community resources/long-range planning.    MISCELLANEOUS CARE:  N/A    WOUND CARE ORDERS  n/a      Medications: Review discharge medications with patient and family and provide education.      Current Discharge Medication List      START taking these medications    Details   ferrous sulfate 325 (65 FE) MG EC tablet Take 1 tablet (325 mg total) by mouth once daily.  Refills: 0      losartan (COZAAR) 25 MG tablet Take 0.5 tablets (12.5 mg total) by mouth once daily.  Qty: 45 tablet, Refills: 3      tiZANidine (ZANAFLEX) 4 MG tablet Take 1 tablet (4 mg total) by mouth every 8 (eight) hours. for 10 days  Qty: 30 tablet, Refills: 0         CONTINUE these medications which have CHANGED    Details   warfarin (COUMADIN) 6 MG tablet Take 1 tablet (6 mg total) by mouth Daily.  Qty: 30 tablet, Refills: 3         CONTINUE these medications which have NOT CHANGED    Details    albuterol (PROVENTIL/VENTOLIN HFA) 90 mcg/actuation inhaler Inhale 1-2 puffs into the lungs every 6 (six) hours as needed for Wheezing. Rescue  Qty: 1 Inhaler, Refills: 0      aspirin (ECOTRIN) 81 MG EC tablet Take 81 mg by mouth once daily.      furosemide (LASIX) 40 MG tablet Take 80 mg by mouth 2 (two) times daily.      nitroGLYCERIN (NITROSTAT) 0.4 MG SL tablet Place 1 tablet (0.4 mg total) under the tongue every 5 (five) minutes as needed for Chest pain. Tablet, Sublingual Sublingual  Qty: 30 tablet, Refills: 11         STOP taking these medications       isosorbide dinitrate (ISORDIL) 20 MG tablet Comments:   Reason for Stopping:         torsemide (DEMADEX) 20 MG Tab Comments:   Reason for Stopping:               I certify that this patient is confined to his home and needs intermittent skilled nursing care, physical therapy and occupational therapy.

## 2019-04-22 NOTE — PLAN OF CARE
Problem: Physical Therapy Goal  Goal: Physical Therapy Goal  Goals to be met by: 2019     Patient will increase functional independence with mobility by performin. Supine to sit with Set-up [place]  2. Sit to supine with Set-up [place]  3. Sit to stand transfer with Supervision  4. Bed to chair transfer with Supervision using Rolling Walker  5. Gait  x 100 feet with Supervision using Rolling Walker.   6. Ascend/descend 3 stair with no Handrails Contact Guard Assistance using HHA.   7. Lower extremity exercise program x15 reps per handout, with supervision    Outcome: Ongoing (interventions implemented as appropriate)  Goals set

## 2019-04-22 NOTE — CONSULTS
PHARMACY CONSULT NOTE     1) Warfarin Dosing      Hamlet Terrell is a 53 y.o. male on warfarin therapy for Atrial Fibrillation. PharmD has been consulted for warfarin dosing.     Current order: None  Home dose: Patient previously followed with Dr. Kerrie Swift. Called Dr. Swift's clinic, patient has not followed up with the clinic since October 2018. At the time INR was 1.2 and patient was instructed to take 15mg (6mg x 2.5 tablets) daily. Patient reports taking 6mg twice daily and was adamant that this was the dose prescribed. No record of twice daily dosing at the clinic or [facility]. Patient reports compliance with medication.   Coumadin clinic enrollment: Not active. Can offer patient to follow in coumadin clinic   INR goal: 2-3     Lab Results   Component Value Date    INR 2.8 (H) 04/22/2019    INR 3.9 (H) 04/21/2019    INR 4.2 (H) 04/20/2019       Significant drug interactions: None   Diet: Adult Cardiac     Recommendation(s):   · INR priti very quickly with reported home dose of total 12mg daily. If patient noncompliant at home may be seeing the effects of giving warfarin in a controlled setting. Patient denies vomiting or diarrhea, he reports he usually eats green beans at home but has not had any inpatient. Educated on consistency with green vegetables.   · Restart warfarin at 6mg by mouth daily and monitor INR trend.   · Patient requires outpatient monitoring. If patient discharges will need follow-up INR this week. Please contact pharmacy if assistance is required to enroll patient in coumadin clinic.       Thank you for the consult,  Sofi Guerra, PharmD, BCPS, Internal Medicine Clinical Pharmacy Specialist  EXT [phone]        **Note: Consults are reviewed Monday-Friday 7:00am-3:30pm. THE ABOVE RECOMMENDATIONS ARE ONLY SUGGESTED.THE RECOMMENDATIONS SHOULD BE CONSIDERED IN CONJUNCTION WITH ALL PATIENT FACTORS. **

## 2019-04-22 NOTE — PLAN OF CARE
04/22/19 1030   Discharge Reassessment   Assessment Type Discharge Planning Reassessment   Do you have any problems affording any of your prescribed medications? No   Discharge Plan A Rehab   Discharge Plan B Other  (out pt rehab)   Anticipated Discharge Disposition Rehab   How does the patient rate their overall health at the present time? Fair   Describe the patient's ability to walk at the present time. Walks with the help of equipment   How often would a person be available to care for the patient? Infrequently   Number of comorbid conditions (as recorded on the chart) Five or more     Patient awake & alert in recliner when CM rounded. No family present. CM questioned the patient regarding in-pt rehab placement or out-pt rehab following discharge. Patient stated that he gets SOB ambulating even short distances & would rather be admitted to an in-pt rehab. Patient stated that he did not have an in-pt rehab preference but requested that the facility be close to his house. PM&R consult ordered. CM informed MODESTA Avila ([id number]) of above.     CM asked the patient if he had a cardiologist, if he knew of a cardiologist he would like to get established with, or if he would like to get established at the [facility]. Patient refused all of the above & stated that he only wants to be seen by his PCP Dr. Carlin Swift. CM asked the patient where he gets is coumadin levels drawn & who follows his coumadin. Patient stated that he gets his coumadin levels drawn at a lab in [facility] every other week & that Dr Swift follows his labs. CM unable to contact Dr. Swift's office to request that the patient's coumadin level be drawn on 4/25/19. CM informed AYESHA Noriega ([id number]) of above. Will continue to follow.

## 2019-04-22 NOTE — PLAN OF CARE
04/22/19 1600   Post-Acute Status   Post-Acute Authorization Home Health/Hospice   Post-Acute Placement Status Set-up Complete     The liaison from [facility] informed the SW that the pt will not likely qualify for inpt rehab.  The liaison from [facility] stated that she spoke with the pt and the pt stated that he did not realize that he would stay over night there and just wanted to get therapy for 3 hours and leave. Pt was agreeable to having HH.  SW sent a referral to [facility] who was able to accept the pt and will be able to provide therapy.  SW informed them that the pt will not dc today per CM.  SW will f/u tomorrow.    Margarita Smith, THOMAS x [phone]

## 2019-04-22 NOTE — PLAN OF CARE
Problem: Arrhythmia/Dysrhythmia (Heart Failure)  Goal: Stable Heart Rate and Rhythm  Patient educated on the importance of following md orders on diet and medication regimen in relation to CHF with understanding verbalized,

## 2019-04-22 NOTE — PT/OT/SLP EVAL
"Physical Therapy Evaluation    Patient Name:  Hamlet Terrell   MRN:  [id number]    Recommendations:     Discharge Recommendations:  home health PT   Discharge Equipment Recommendations: walker, rolling   Barriers to discharge: Inaccessible home and Decreased caregiver support    Assessment:     Hamlet Terrell is a 53 y.o. male admitted with a medical diagnosis of Acute on chronic systolic congestive heart failure.  He presents with the following impairments/functional limitations:  weakness, impaired endurance, impaired functional mobilty, gait instability, impaired balance, pain, edema Pt. cooperative and tolerated treatment fairly well except fatigues quickly and c/o LE pain/edema and itching "alll over".    Rehab Prognosis: Good; patient would benefit from acute skilled PT services to address these deficits and reach maximum level of function.    Recent Surgery: * No surgery found *      Plan:     During this hospitalization, patient to be seen 3 x/week to address the identified rehab impairments via gait training, therapeutic activities, therapeutic exercises and progress toward the following goals:    · Plan of Care Expires:  05/22/19    Subjective     Chief Complaint: itching and LE discomfort  Patient/Family Comments/goals: to go home  Pain/Comfort:  · Pain Rating 1: (pt. did not rate)  · Location - Side 1: Bilateral  · Location - Orientation 1: generalized  · Location 1: leg  · Pain Addressed 1: Reposition, Cessation of Activity    Patients cultural, spiritual, Uatsdin conflicts given the current situation: no    Living Environment:  Pt. Lives alone in [facility] with 3 ANAID and no handrails  Prior to admission, patients level of function was indep.  Equipment used at home: none.  Upon discharge, patient will have assistance from friend.    Objective:     Communicated with nursing prior to session.  Patient found up in chair with telemetry, peripheral IV  upon PT entry to room.    General Precautions: Standard, " fall   Orthopedic Precautions:N/A   Braces:       Exams:  · RUE ROM: WFL  · RUE Strength: WFL  · LUE ROM: WFL  · LUE Strength: WFL  · RLE ROM: WFL  · RLE Strength: WFL  · LLE ROM: WFL  · LLE Strength: WFL    Functional Mobility:  · Transfers:     · Sit to Stand:  contact guard assistance with rolling walker  · Bed to Chair: contact guard assistance with  rolling walker  using  Stand Pivot  · Gait: 20' with RW and CGA. Pt. amb. with decreased step length/jordan.  · Balance: fair      Therapeutic Activities and Exercises:   Discussed therapy/DME needs, goals, and POC.    AM-PAC 6 CLICK MOBILITY  Total Score:18     Patient left up in chair with all lines intact and call button in reach.    GOALS:   Multidisciplinary Problems     Physical Therapy Goals        Problem: Physical Therapy Goal    Goal Priority Disciplines Outcome Goal Variances Interventions   Physical Therapy Goal     PT, PT/OT Ongoing (interventions implemented as appropriate)     Description:  Goals to be met by: 2019     Patient will increase functional independence with mobility by performin. Supine to sit with Set-up [place]  2. Sit to supine with Set-up [place]  3. Sit to stand transfer with Supervision  4. Bed to chair transfer with Supervision using Rolling Walker  5. Gait  x 100 feet with Supervision using Rolling Walker.   6. Ascend/descend 3 stair with no Handrails Contact Guard Assistance using HHA.   7. Lower extremity exercise program x15 reps per handout, with supervision                      History:     Past Medical History:   Diagnosis Date    Anticoagulant long-term use     Cardiomegaly     CHF (congestive heart failure)     Chronic combined systolic and diastolic congestive heart failure     Coronary artery disease     CVA (cerebrovascular accident)     Heart attack     Hypertension     Hyperthyroidism, subclinical 2013    MI (myocardial infarction) 2013    MI in       Paroxysmal atrial  fibrillation     PE (pulmonary embolism) 1/1/2013    IN 2010     S/P ablation of atrial flutter 2008    Stroke 2009    no residual weaknesses       Past Surgical History:   Procedure Laterality Date    RADIOFREQUENCY ABLATION  01/08/2008    for atrial flutter       Time Tracking:     PT Received On: 04/22/19  PT Start Time: 1432     PT Stop Time: 1457  PT Total Time (min): 25 min     Billable Minutes: Evaluation 25      Markus Monae, PT  04/22/2019

## 2019-04-22 NOTE — PLAN OF CARE
REFERRAL SENT TO     [facility] E-Referral     [facility] E-Referral     [facility] E-Referral

## 2019-04-22 NOTE — NURSING
Hazel Noriega.PA spoken with regarding patient's concerns of discharge. Patient in progress to be placed in inpatient rehab social workers in working on this transition.

## 2019-04-22 NOTE — NURSING
Spoke with Dr. Isabel which she communicated to speak with AYESHA Chew regarding patients concerns of discharge. Chalo stated that Hari will speak with patient.

## 2019-04-22 NOTE — PLAN OF CARE
04/22/19 1136   Post-Acute Status   Post-Acute Authorization Placement   Post-Acute Placement Status Referrals Sent     SW was informed that the pt wants to go to inpt rehab.  He has no preference but wants close to his house.  MODESTA sent a referral request to the [facility] for [facility] [place] (the only local one on his insurance list), [facility], and [facility].  MODESTA will f/u as needed.    Margarita Smith, THOMAS x [phone]

## 2019-04-23 VITALS
RESPIRATION RATE: 18 BRPM | TEMPERATURE: 97 F | SYSTOLIC BLOOD PRESSURE: 100 MMHG | WEIGHT: 281.06 LBS | OXYGEN SATURATION: 96 % | DIASTOLIC BLOOD PRESSURE: 50 MMHG | BODY MASS INDEX: 41.63 KG/M2 | HEIGHT: 69 IN | HEART RATE: 73 BPM

## 2019-04-23 PROBLEM — Z74.09 IMPAIRED MOBILITY: Status: ACTIVE | Noted: 2019-04-23

## 2019-04-23 LAB
ALBUMIN SERPL BCP-MCNC: 3.5 G/DL (ref 3.5–5.2)
ALP SERPL-CCNC: 259 U/L (ref 55–135)
ALT SERPL W/O P-5'-P-CCNC: 12 U/L (ref 10–44)
ANION GAP SERPL CALC-SCNC: 8 MMOL/L (ref 8–16)
AST SERPL-CCNC: 22 U/L (ref 10–40)
BASOPHILS # BLD AUTO: 0.05 K/UL (ref 0–0.2)
BASOPHILS NFR BLD: 0.9 % (ref 0–1.9)
BILIRUB SERPL-MCNC: 1.4 MG/DL (ref 0.1–1)
BUN SERPL-MCNC: 41 MG/DL (ref 6–20)
CALCIUM SERPL-MCNC: 10 MG/DL (ref 8.7–10.5)
CHLORIDE SERPL-SCNC: 96 MMOL/L (ref 95–110)
CO2 SERPL-SCNC: 35 MMOL/L (ref 23–29)
CREAT SERPL-MCNC: 1.5 MG/DL (ref 0.5–1.4)
DIFFERENTIAL METHOD: ABNORMAL
EOSINOPHIL # BLD AUTO: 0.4 K/UL (ref 0–0.5)
EOSINOPHIL NFR BLD: 7.9 % (ref 0–8)
ERYTHROCYTE [DISTWIDTH] IN BLOOD BY AUTOMATED COUNT: 15.2 % (ref 11.5–14.5)
EST. GFR  (AFRICAN AMERICAN): >60 ML/MIN/1.73 M^2
EST. GFR  (NON AFRICAN AMERICAN): 52.8 ML/MIN/1.73 M^2
GLUCOSE SERPL-MCNC: 96 MG/DL (ref 70–110)
HCT VFR BLD AUTO: 28.5 % (ref 40–54)
HGB BLD-MCNC: 9 G/DL (ref 14–18)
IMM GRANULOCYTES # BLD AUTO: 0.01 K/UL (ref 0–0.04)
IMM GRANULOCYTES NFR BLD AUTO: 0.2 % (ref 0–0.5)
INR PPP: 2.7 (ref 0.8–1.2)
LYMPHOCYTES # BLD AUTO: 1 K/UL (ref 1–4.8)
LYMPHOCYTES NFR BLD: 18.2 % (ref 18–48)
MAGNESIUM SERPL-MCNC: 1.7 MG/DL (ref 1.6–2.6)
MCH RBC QN AUTO: 26.5 PG (ref 27–31)
MCHC RBC AUTO-ENTMCNC: 31.6 G/DL (ref 32–36)
MCV RBC AUTO: 84 FL (ref 82–98)
MONOCYTES # BLD AUTO: 1.2 K/UL (ref 0.3–1)
MONOCYTES NFR BLD: 21.9 % (ref 4–15)
NEUTROPHILS # BLD AUTO: 2.8 K/UL (ref 1.8–7.7)
NEUTROPHILS NFR BLD: 50.9 % (ref 38–73)
NRBC BLD-RTO: 0 /100 WBC
PLATELET # BLD AUTO: 215 K/UL (ref 150–350)
PMV BLD AUTO: 9.8 FL (ref 9.2–12.9)
POTASSIUM SERPL-SCNC: 3.7 MMOL/L (ref 3.5–5.1)
PROT SERPL-MCNC: 8.4 G/DL (ref 6–8.4)
PROTHROMBIN TIME: 25.8 SEC (ref 9–12.5)
RBC # BLD AUTO: 3.4 M/UL (ref 4.6–6.2)
SODIUM SERPL-SCNC: 139 MMOL/L (ref 136–145)
WBC # BLD AUTO: 5.44 K/UL (ref 3.9–12.7)

## 2019-04-23 PROCEDURE — 85025 COMPLETE CBC W/AUTO DIFF WBC: CPT

## 2019-04-23 PROCEDURE — 25000003 PHARM REV CODE 250: Performed by: PHYSICIAN ASSISTANT

## 2019-04-23 PROCEDURE — 80053 COMPREHEN METABOLIC PANEL: CPT

## 2019-04-23 PROCEDURE — 25000003 PHARM REV CODE 250: Performed by: NURSE PRACTITIONER

## 2019-04-23 PROCEDURE — 99239 HOSP IP/OBS DSCHRG MGMT >30: CPT | Mod: ,,, | Performed by: NURSE PRACTITIONER

## 2019-04-23 PROCEDURE — 97535 SELF CARE MNGMENT TRAINING: CPT

## 2019-04-23 PROCEDURE — 36415 COLL VENOUS BLD VENIPUNCTURE: CPT

## 2019-04-23 PROCEDURE — 99232 SBSQ HOSP IP/OBS MODERATE 35: CPT | Mod: ,,, | Performed by: NURSE PRACTITIONER

## 2019-04-23 PROCEDURE — 63600175 PHARM REV CODE 636 W HCPCS: Performed by: PHYSICIAN ASSISTANT

## 2019-04-23 PROCEDURE — 99239 PR HOSPITAL DISCHARGE DAY,>30 MIN: ICD-10-PCS | Mod: ,,, | Performed by: NURSE PRACTITIONER

## 2019-04-23 PROCEDURE — 99232 PR SUBSEQUENT HOSPITAL CARE,LEVL II: ICD-10-PCS | Mod: ,,, | Performed by: NURSE PRACTITIONER

## 2019-04-23 PROCEDURE — 83735 ASSAY OF MAGNESIUM: CPT

## 2019-04-23 PROCEDURE — 85610 PROTHROMBIN TIME: CPT

## 2019-04-23 RX ORDER — POTASSIUM CHLORIDE 20 MEQ/1
40 TABLET, EXTENDED RELEASE ORAL ONCE
Status: COMPLETED | OUTPATIENT
Start: 2019-04-23 | End: 2019-04-23

## 2019-04-23 RX ORDER — LANOLIN ALCOHOL/MO/W.PET/CERES
400 CREAM (GRAM) TOPICAL ONCE
Status: COMPLETED | OUTPATIENT
Start: 2019-04-23 | End: 2019-04-23

## 2019-04-23 RX ADMIN — MAGNESIUM OXIDE TAB 400 MG (241.3 MG ELEMENTAL MG) 400 MG: 400 (241.3 MG) TAB at 09:04

## 2019-04-23 RX ADMIN — FUROSEMIDE 80 MG: 80 TABLET ORAL at 09:04

## 2019-04-23 RX ADMIN — FERROUS SULFATE TAB EC 325 MG (65 MG FE EQUIVALENT) 325 MG: 325 (65 FE) TABLET DELAYED RESPONSE at 09:04

## 2019-04-23 RX ADMIN — ASPIRIN 81 MG: 81 TABLET, COATED ORAL at 09:04

## 2019-04-23 RX ADMIN — WARFARIN SODIUM 6 MG: 5 TABLET ORAL at 05:04

## 2019-04-23 RX ADMIN — POTASSIUM CHLORIDE 40 MEQ: 1500 TABLET, EXTENDED RELEASE ORAL at 09:04

## 2019-04-23 RX ADMIN — HYDROMORPHONE HYDROCHLORIDE 0.2 MG: 1 INJECTION, SOLUTION INTRAMUSCULAR; INTRAVENOUS; SUBCUTANEOUS at 08:04

## 2019-04-23 RX ADMIN — LOSARTAN POTASSIUM 12.5 MG: 25 TABLET, FILM COATED ORAL at 09:04

## 2019-04-23 RX ADMIN — FUROSEMIDE 80 MG: 80 TABLET ORAL at 05:04

## 2019-04-23 RX ADMIN — LIDOCAINE 2 PATCH: 50 PATCH TOPICAL at 12:04

## 2019-04-23 RX ADMIN — TIZANIDINE 4 MG: 4 TABLET ORAL at 05:04

## 2019-04-23 RX ADMIN — DIPHENHYDRAMINE HYDROCHLORIDE 50 MG: 25 CAPSULE ORAL at 08:04

## 2019-04-23 RX ADMIN — TIZANIDINE 4 MG: 4 TABLET ORAL at 01:04

## 2019-04-23 RX ADMIN — HYDROMORPHONE HYDROCHLORIDE 0.2 MG: 1 INJECTION, SOLUTION INTRAMUSCULAR; INTRAVENOUS; SUBCUTANEOUS at 03:04

## 2019-04-23 NOTE — CONSULTS
PHARMACY CONSULT NOTE     1) Warfarin Dosing      Hamlet Terrell is a 53 y.o. male on warfarin therapy for Atrial Fibrillation. PharmD has been consulted for warfarin dosing.     Current order: None  Home dose: Patient previously followed with Dr. Kerrie Swift. Called Dr. Swift's clinic, patient has not followed up with the clinic since October 2018. At the time INR was 1.2 and patient was instructed to take 15mg (6mg x 2.5 tablets) daily. Patient reports taking 6mg twice daily and was adamant that this was the dose prescribed. No record of twice daily dosing at the [facility] or [facility]. Patient reports compliance with medication.   Coumadin clinic enrollment: Not active. Can offer patient to follow in coumadin clinic   INR goal: 2-3     Lab Results   Component Value Date    INR 2.7 (H) 04/23/2019    INR 2.8 (H) 04/22/2019    INR 3.9 (H) 04/21/2019      Significant drug interactions: None   Diet: Adult Cardiac     Recommendation(s):   · Continue warfarin 6mg daily and monitor INR.   · Patient requires outpatient monitoring. Please contact pharmacy if assistance is required to enroll patient in coumadin clinic.       Thank you for the consult,  Sofi Guerra, PharmD, BCPS, Internal Medicine Clinical Pharmacy Specialist  EXT [phone]        **Note: Consults are reviewed Monday-Friday 7:00am-3:30pm. THE ABOVE RECOMMENDATIONS ARE ONLY SUGGESTED.THE RECOMMENDATIONS SHOULD BE CONSIDERED IN CONJUNCTION WITH ALL PATIENT FACTORS. **

## 2019-04-23 NOTE — ASSESSMENT & PLAN NOTE
Baseline Cr ~1.3  - daily BMP  - strict Is/Os  - now with JEAN-PIERRE, likely prerenal from overdiuresis- resolving

## 2019-04-23 NOTE — ASSESSMENT & PLAN NOTE
H/H 8.1/26.2  - Fe and saturated Fe low, B12 552, MMA pending  - now with epistaxis from supratherapeutic INR, serial H/H  - started on ferrous sulfate 325 mg daily   - will consent for blood, type and screen- no need for transfusion at this time; patient refused to sign consent and wanted to think about it, consent in room

## 2019-04-23 NOTE — NURSING
Pt to be D/c tonight at 6 via family transport. PIV removed. Telemetry removed. VSS. Will continue to monitor.

## 2019-04-23 NOTE — PLAN OF CARE
Problem: Occupational Therapy Goal  Goal: Occupational Therapy Goal  Goals set 4/20 to be addressed for 7 days with expiration date, 4/27:  Patient will increase functional independence with ADLs by performing:    Patient will demonstrate stand pivot transfers with modified independence.   Not met  Patient will demonstrate grooming while standing with modified independence.   Not met  Patient will demonstrate lower body dressing with modified independence while seated EOB.   Not met  Patient will demonstrate toileting with modified independence.   Not met  Patient will demonstrate bathing while seated EOB with modified independence.   Not met  Patient will demonstrate independence with HEP for energy conservation during ADLs.    Not met       Goals remain appropriate.  DHRUV Godfrey  4/23/2019

## 2019-04-23 NOTE — PHYSICIAN QUERY
PT Name: Hamlet Terrell  MR #: [id number]     PHYSICIAN QUERY -  ELECTROLYTE CLARIFICATION      CDS/: Ruth Berg RN              Contact information:[phone]  This form is a permanent document in the medical record.     Query Date: April 23, 2019    By submitting this query, we are merely seeking further clarification of documentation to reflect the severity of illness of your patient. Please utilize your independent clinical judgment when addressing the question(s) below.    The Medical record reflects the following:     Indicators   Supporting Clinical Findings Location in Medical Record   x Lab Value(s) Magnesium = 1.5-> 1.4-> 2.0 Lab 4/16, 4/16     x Treatment                                 Medication Magnesium sulfate IVPB      Magnesium oxide oral  MAR 4/16    MAR 4/16    Other       Provider, please specify the diagnosis or diagnoses that correspond(s) to the above indicators. Lester all that apply:    [  x ] Hypomagnesemia     [   ] Other electrolyte disturbance (please specify): _______     [   ]  Clinically Undetermined       Please document in your progress notes daily for the duration of treatment until resolved, and include in your discharge summary.

## 2019-04-23 NOTE — ASSESSMENT & PLAN NOTE
Resolving  Cr 2-->2.3-->2.6--> 2--> 1.7--> 1.6  - combination of contrast induced nephropathy with prerenal JEAN-PIERRE due to over diuresis  - FeUrea with prerenal JEAN-PIERRE  - started on IVF, but stopped with cardiology recommendation that oral hydration will resolve; given 250 cc bolus in setting of hypotension  - restart Lasix 80 mg BID PO

## 2019-04-23 NOTE — ASSESSMENT & PLAN NOTE
NSVT  Patient presents with worsening LE and orthopnea for 3 weeks. His home Lasix 40mg BID has not helped him. He reports dry weight of 240lbs, currently 271. Given IVP 80mg in the ED with ~ 1L output.  on admission.  - TTE with EF 23%, diastolic dysfunction, severe biatrial enlargement, pulmonary hypertension, abnormal septal wall motion  - cardiology consulted for life vest vs. AICD placement given runs of vtach and reduced EF. Patient refusing intervention after long discussion  - given Lasix 80 IV TID, metolazone 2.5mg before  - output ~1700L  - Lasix, metolazone held in setting of JEAN-PIERRE likely from rapid diuresis, will get repeat BMP and CXR in am  - patient still with crackles on exam and BLE edema  - Cr improved to 1.7, restart home Lasix 80 mg BID PO  - repeat   - CXR with pulmonary edema  - telemetry with afib and multiple runs of vtach  - high risk for sudden cardiac death, extensively explained to patient, not interested in AICD/ life vest  - not currently on BB, ARB, started losartan 12.5 mg, will need to titrate up to maximum dose for GDMT  - Metoprolol succinate or Carvedilol for GDMT once BP stable, currently not able to tolerate it  - strict Is/Os, daily weights  - cardiac diet, 1.5L fluid restriction

## 2019-04-23 NOTE — PLAN OF CARE
Problem: Adult Inpatient Plan of Care  Goal: Plan of Care Review  Outcome: Ongoing (interventions implemented as appropriate)     04/23/19 0332   Plan of Care Review   Plan of Care Reviewed With patient   Progress no change   POC reviewed with Pt. AAOx4. Pt c/o leg and back pain, pain meds given prn. Pt up in chair all day and night, encouraged pt to elevate his legs to reduce swelling, pt refused. SCD's in place. Afib on tele. Pt called nurse into room and asked if something was crawling on his face? Nurse reassured Pt nothing is on his face. Pt stated that he sometimes hallucinate see things that are not there, stated he was reluctant to say something because he didn't want me to think that he was crazy. Educated pt that pain meds can cause a side effect of  hallucination, as he just received IV dilaudid, asked pt if he wanted to change to a different pain med? Pt refused. Vitals stable. Safety maintained. Will continue to monitor.

## 2019-04-23 NOTE — PT/OT/SLP PROGRESS
"Occupational Therapy   Treatment    Name: Hamlet Terrell  MRN: [id number]  Admitting Diagnosis:  Acute on chronic systolic congestive heart failure       Recommendations:     Discharge Recommendations: home health OT  Discharge Equipment Recommendations:  walker, rolling  Barriers to discharge:  None    Assessment:     Hamlet Terrell is a 53 y.o. male with a medical diagnosis of Acute on chronic systolic congestive heart failure.  He presents with performance deficits affecting function are impaired endurance, pain. Self-limiting during the session.    Rehab Prognosis:  Good; patient would benefit from acute skilled OT services to address these deficits and reach maximum level of function.       Plan:     Patient to be seen 2 x/week to address the above listed problems via self-care/home management, therapeutic activities, therapeutic exercises  · Plan of Care Expires: 05/17/19  · Plan of Care Reviewed with: patient    Subjective   Patient:  "I have been taking a whole bunch of pain medicine for my back."  Nurse:  "He has been walking to the bathroom."  Pain/Comfort:  · Pain Rating 1: 7/10  · Location 1: (back)  · Pain Addressed 1: Reposition  · Pain Rating Post-Intervention 1: 5/10    Objective:     Communicated with: Nurse prior to session.  Patient found supine with peripheral IV, SCD, telemetry upon OT entry to room.  Family not present.  General Precautions: Standard, fall   Orthopedic Precautions:N/A   Braces: N/A     Occupational Performance:     Bed Mobility:    · Not performed 2* seated in bedside chair    Functional Mobility/Transfers:  · Patient completed Sit <> Stand Transfer with supervision  with  no assistive device     Activities of Daily Living:  · Feeding:  modified independence    · Grooming: supervision while standing    [facility] 6 Click ADL: 19    Patient left up in chair with all lines intact and call button in reachEducation:      GOALS:   Multidisciplinary Problems     Occupational Therapy Goals  "       Problem: Occupational Therapy Goal    Goal Priority Disciplines Outcome Interventions   Occupational Therapy Goal     OT, PT/OT     Description:  Goals set 4/20 to be addressed for 7 days with expiration date, 4/27:  Patient will increase functional independence with ADLs by performing:    Patient will demonstrate stand pivot transfers with modified independence.   Not met  Patient will demonstrate grooming while standing with modified independence.   Not met  Patient will demonstrate lower body dressing with modified independence while seated EOB.   Not met  Patient will demonstrate toileting with modified independence.   Not met  Patient will demonstrate bathing while seated EOB with modified independence.   Not met  Patient will demonstrate independence with HEP for energy conservation during ADLs.    Not met                        Time Tracking:     OT Date of Treatment: 04/23/19  OT Start Time: 0635  OT Stop Time: 0654  OT Total Time (min): 19 min    Billable Minutes:Self Care/Home Management 19    DHRUV Godfrey  4/23/2019

## 2019-04-23 NOTE — HOSPITAL COURSE
4/22/19: Evaluated by PT. Sit to stand CGA w/ RW/ Ambulated 20 ft CGA w/ RW.  4/23/19: Participated w/ OT. Sit to stand SV. Feeing mod(I). Grooming SV.

## 2019-04-23 NOTE — DISCHARGE INSTRUCTIONS
Please follow up with PCP for INR monitoring. Please continue to have coumadin  checked regularly    Please defer to PCP for additional medication adjustments

## 2019-04-23 NOTE — ASSESSMENT & PLAN NOTE
Chronic anticoagulation    - EKG with afib  - not on BB  - patient taking 6 mg BID, rather than 12 mg daily. Educated patient on proper use. Has not had INR checked since October  - INR 1.8 on admission, subtherapeutic  - continue coumadin, consult PharmD  - INR rising rapidly, INR 4.2, warfarin held--> 3.9, continue to hold  - patient with epistaxis, bleeding controlled with pressure, afrin spray BID  - serial H/Hs, will monitor- stable  - Extensive discussion with patient on switching to DOAC, not interested in this time  - refusing to follow up in [facility] [place]in clinic. Only wants to go to his PCP Dr. Swift, but patient had not been since October  - he is a poor candidate for warfarin, but refusing to switch to DOAC.  - daily INR  - Will start on BB prior to discharge

## 2019-04-23 NOTE — ASSESSMENT & PLAN NOTE
Patient complaining of shooting lumbar pain X 1 week. Denies preceding injury  - CT lumbar spine with lumbar spondylosis L5-S1 with mild posterior disc bulge  - PT/OT consulted, recommend HH  - incidental finding of small (1.8 cm) hypoattenuating lesion in the right kidney, not well characterized on this exam.  Neoplasia not excluded  -retroperitoneal ultrasound poorly visualized lesion secondary to poor sonographic penetration, will need surveilience  - discharge with HH

## 2019-04-23 NOTE — CONSULTS
[facility]  Physical Medicine & Rehab  Consult Note    Patient Name: Hamlet Terrell  MRN: [id number]  Admission Date: 4/15/2019  Hospital Length of Stay: 5 days  Attending Physician:     Inpatient consult to Physical Medicine & Rehabilitation  Consult performed by: Mary Buitrago NP  Consult requested by:  Winsome Ford MD    Collaborating Physician: Mayra Moscoso MD  Reason for Consult:  Assess rehabilitation needs     Consults  Subjective:     Principal Problem: Acute on chronic systolic congestive heart failure    HPI: Hamlet Terrell is a 53-year-old male with PMHx of HTN, chronic systolic and diastolic heart failure, CVA, CAD, and MI. Admitted for acute CHF exacerbation and chronic leg swelling that has worsened over the last 3 weeks.Hospital course complicated by JEAN-PIERRE (resolving, restarted on home Lasix dose). INR therapeutic and warfarin restarted. H&H stable.    Functional History: Patient nhi alone in H with 3 ANAID and no handrails. Prior to admission, patient's level of function was indep.  Equipment     Hospital Course:   4/22/19: Evaluated by PT. Sit to stand CGA w/ RW/ Ambulated 20 ft CGA w/ RW.  4/23/19: Participated w/ OT. Sit to stand SV. Feeing mod(I). Grooming SV.     Past Medical History:   Diagnosis Date    Anticoagulant long-term use     Cardiomegaly     CHF (congestive heart failure)     Chronic combined systolic and diastolic congestive heart failure     Coronary artery disease     CVA (cerebrovascular accident)     Heart attack 2006    Hypertension     Hyperthyroidism, subclinical 1/2/2013    MI (myocardial infarction) 9/22/2013    MI in 2009      Paroxysmal atrial fibrillation     PE (pulmonary embolism) 1/1/2013    IN 2010     S/P ablation of atrial flutter 2008    Stroke 2009    no residual weaknesses     Past Surgical History:   Procedure Laterality Date    RADIOFREQUENCY ABLATION  01/08/2008    for atrial flutter     Review of patient's allergies  indicates:   Allergen Reactions    Acetaminophen      Itching    Oxycodone-acetaminophen      Other reaction(s): Itching    Ace inhibitors Other (See Comments)     cough       Scheduled Medications:    ammonium lactate   Topical (Top) BID    aspirin  81 mg Oral Daily    ferrous sulfate  325 mg Oral Daily    furosemide  80 mg Oral BID    lidocaine  2 patch Transdermal Q24H    losartan  12.5 mg Oral Daily    oxymetazoline  3 spray Each Nare BID    potassium bicarbonate  50 mEq Oral Once    tiZANidine  4 mg Oral Q8H    warfarin  6 mg Oral Daily       PRN Medications: albuterol, diphenhydrAMINE, HYDROmorphone, hydrOXYzine pamoate, nitroGLYCERIN, ondansetron, prochlorperazine, sodium chloride 0.9%, sodium chloride 0.9%    Family History     Problem Relation (Age of Onset)    Alcohol abuse Father    Hypertension Mother, Father, Sister, Brother    Stroke Mother        Tobacco Use    Smoking status: Never Smoker    Smokeless tobacco: Never Used   Substance and Sexual Activity    Alcohol use: No    Drug use: No    Sexual activity: Never     Review of Systems   Constitutional: Positive for activity change. Negative for fatigue and fever.   HENT: Negative for trouble swallowing and voice change.    Respiratory: Negative for cough and shortness of breath.    Cardiovascular: Negative for chest pain and leg swelling.   Gastrointestinal: Negative for abdominal distention and abdominal pain.   Genitourinary: Negative for difficulty urinating and flank pain.   Musculoskeletal: Positive for back pain and gait problem.        - bilateral knee pain   Skin: Negative for color change and rash.   Neurological: Positive for weakness. Negative for speech difficulty and numbness.   Psychiatric/Behavioral: Negative for agitation and confusion.     Objective:     Vital Signs (Most Recent):  Temp: 98.8 °F (37.1 °C) (04/23/19 0757)  Pulse: 64 (04/23/19 0800)  Resp: 18 (04/23/19 0757)  BP: (!) 106/52 (04/23/19 0757)  SpO2: 95 %  (04/23/19 4407)    Vital Signs (24h Range):  Temp:  [97.4 °F (36.3 °C)-98.8 °F (37.1 °C)] 98.8 °F (37.1 °C)  Pulse:  [51-99] 64  Resp:  [18] 18  SpO2:  [93 %-100 %] 95 %  BP: ()/(51-65) 106/52     Body mass index is 41.51 kg/m².    Physical Exam   Constitutional: He is oriented to person, place, and time. He appears well-developed and well-nourished.   HENT:   Head: Normocephalic and atraumatic.   Eyes: Pupils are equal, round, and reactive to light. EOM are normal. Right eye exhibits no discharge. Left eye exhibits no discharge.   Neck: Neck supple.   Pulmonary/Chest: Effort normal. No respiratory distress.   Abdominal: Soft. He exhibits no distension. There is no tenderness.   Musculoskeletal: He exhibits edema (bilateral leg). He exhibits no deformity.   RUE: 5/5.  LUE: 5/5.    RLE: 3/5.  LLE: 3/5. Decreased effort 2/2 knee pain    Neurological: He is alert and oriented to person, place, and time. No sensory deficit. He exhibits normal muscle tone.   Skin: Skin is warm and dry.   Psychiatric: He has a normal mood and affect. His behavior is normal. Thought content normal.   Nursing note and vitals reviewed.    Diagnostic Results:   Labs: Reviewed   EKG: Reviewed   CT: Reviewed    Assessment/Plan:     * Acute on chronic systolic congestive heart failure  - Cardiology managing   - Vtach and A fib    Impaired mobility  - Related to prolonged/acute hospital course.     Recommendations  -  Encourage mobility, OOB in chair at least 3 hours per day, and early ambulation as appropriate  -  PT/OT evaluate and treat  -  Pain management  -  Monitor for and prevent skin breakdown and pressure ulcers  · Early mobility, repositioning/weight shifting every 20-30 minutes when sitting, turn patient every 2 hours, proper mattress/overlay and chair cushioning, pressure relief/heel protector boots  -  DVT prophylaxis    -  Reviewed discharge options (IP rehab, SNF, HH therapy, and OP therapy)    JEAN-PIERRE (acute kidney injury)  -  Improving     Chronic anticoagulation  - Warfarin restarted      Recommend PT outpatient therapy per Dr. Fonseca.     Thank you for your consult.     Mary Buitrago NP  Department of Physical Medicine & Rehab  [facility]

## 2019-04-23 NOTE — ASSESSMENT & PLAN NOTE
- not on HTN meds  - started on losartan 12.5  for GDMT for CHF  - needs BB but low-normal bp currently, will not tolerate it at this time

## 2019-04-23 NOTE — PROGRESS NOTES
[facility] Hospital Medicine  Progress Note    Patient Name: Hamlet Terrell  MRN: [id number]  Patient Class: IP- Inpatient   Admission Date: 4/15/2019  Length of Stay: 4 days  Attending Physician: Joselyn Isabel MD  Primary Care Provider: Carlin Swift MD    [facility] Medicine Team: [facility] HOSP MED F Hazel Noriega PA-C    Subjective:     Principal Problem:Acute on chronic systolic congestive heart failure    HPI:  Patient is 54 yo male with a PMHx of HTN, chronic systolic and diastolic heart failure, CVA, CAD, and MI being admitted to observation for acute CHF exacerbation. Patient reports chronic leg swelling that has worsened over the last 3 weeks. He denies shortness of breath, but endorses cough, orthopnea, and a 30lb weight gain. He states that his Lasix was increased to 40mg twice a day weeks ago and he has seen no improvement in his symptoms. He denies dietary indiscretions, but has not been adherent to a fluid restricted diet. He denies fevers, chills, chest pain, abdominal distension. He has chronic low back pain.    In the ED, vitals stable on room air. CXR with edema. Intake labs remarkable for , troponin 0.065, alkphos 225, tbili 2.3. He was given lasix 80 IVP and admitted to observation for further management.    Hospital Course:  Mr. Terrell was admitted to observation for CHF exacerbation. TTE with EF 23%, diastolic dysfunction, severe biatrial enlargement, pulmonary hypertension, abnormal septal wall motion. Pharmacy consulted for coumadin, INR subtherapeutic. Increased warfarin to 12 mg daily, patient was taking 6 mg BID, educated patient on correct use. Troponin trended flat. Patient complaining of lumbar back pain, CT lumbar spine with mild lumbar spondylosis L5-S1 with mild posterior disc bulge resulting in mild neural foraminal narrowing. Incidental finding of indeterminate hypodense lesion in the right kidney. Retroperitoneal ultrasound poorly visualized the  lesion, will need continued monitored survelience. Cardiology consulted for evaluation of life vest vs. AICD for patient given runs of vtach and poor EF. Patient refused AICD/ life vest. Cr 2-->2.3, lasix held --> 2.6--> 2. INR supratherapeutic, warfarin held. Patient with epistaxis, resolved. H/H stable. INR therapeutic, restarted warfarin.     Interval History: JEAN-PIERRE resolving, restarted on home Lasix dose. INR therapeutic today. H/H stable. Patient was discharged but then reported that he felt too weak to go home and wanted to go to inpatient rehab. Patient does not qualify for inpatient rehab. Started on losartan 12.5 mg for GDMT, will need to titrate up to maximum dose. Not able to tolerate starting BB at this time due to low-normal blood pressure. INR therapeutic today, warfarin restarted.     Review of Systems   Constitutional: Negative for chills and fever.   HENT: Negative for congestion, nosebleeds and rhinorrhea.    Respiratory: Positive for shortness of breath. Negative for cough, chest tightness and wheezing.    Cardiovascular: Positive for leg swelling. Negative for chest pain and palpitations.   Gastrointestinal: Negative for abdominal pain, constipation, diarrhea and vomiting.   Genitourinary: Negative for dysuria and hematuria.   Musculoskeletal: Positive for back pain and gait problem. Negative for myalgias.   Skin: Negative for color change, pallor and wound.        urticaria   Neurological: Positive for weakness. Negative for dizziness and seizures.   Psychiatric/Behavioral: Negative for agitation and behavioral problems.     Objective:     Vital Signs (Most Recent):  Temp: 97.7 °F (36.5 °C) (04/22/19 1933)  Pulse: 75 (04/22/19 1934)  Resp: 18 (04/22/19 1933)  BP: 116/65 (04/22/19 1933)  SpO2: 96 % (04/22/19 1933) Vital Signs (24h Range):  Temp:  [97.4 °F (36.3 °C)-98.4 °F (36.9 °C)] 97.7 °F (36.5 °C)  Pulse:  [62-89] 75  Resp:  [18] 18  SpO2:  [90 %-98 %] 96 %  BP: (108-135)/(55-78) 116/65      Weight: 127.5 kg (281 lb 1.4 oz)  Body mass index is 41.51 kg/m².    Intake/Output Summary (Last 24 hours) at 4/22/2019 2028  Last data filed at 4/22/2019 1600  Gross per 24 hour   Intake 2240 ml   Output 3400 ml   Net -1160 ml      Physical Exam   Constitutional: He is oriented to person, place, and time. He appears well-developed and well-nourished. No distress.   HENT:   Head: Normocephalic and atraumatic.   Nose: No epistaxis.   Eyes: Pupils are equal, round, and reactive to light. EOM are normal.   Neck: Normal range of motion. Neck supple.   Cardiovascular: Normal rate and intact distal pulses. An irregularly irregular rhythm present.   No murmur heard.  Pulmonary/Chest: Effort normal. He has no wheezes. He has rales. He exhibits no tenderness.   Abdominal: Soft. Bowel sounds are normal. There is no tenderness. There is no rebound, no CVA tenderness, no tenderness at McBurney's point and negative Pham's sign.   Musculoskeletal: Normal range of motion. He exhibits edema (slightly improved BLE edema). He exhibits no tenderness.   Neurological: He is alert and oriented to person, place, and time.   Skin: Skin is warm and dry. Capillary refill takes less than 2 seconds. He is not diaphoretic.   Psychiatric: He has a normal mood and affect. His behavior is normal. Judgment and thought content normal.   Nursing note and vitals reviewed.      Significant Labs:   Bilirubin:   Recent Labs   Lab 04/19/19  0043 04/19/19  0420 04/20/19  0501 04/21/19  0349 04/22/19  0503   BILITOT 1.3* 1.2* 1.1* 1.1* 1.5*     CBC:   Recent Labs   Lab 04/21/19  1403 04/21/19 2003 04/22/19  0503   WBC 5.39 6.47 5.66  5.59   HGB 8.2* 8.8* 8.5*  8.5*   HCT 26.7* 27.9* 27.8*  27.7*    178 210  212     CMP:   Recent Labs   Lab 04/21/19  0349 04/22/19  0503    137   K 3.9 3.6   CL 98 95   CO2 30* 34*   * 122*   BUN 52* 48*   CREATININE 1.7* 1.6*   CALCIUM 9.5 9.8   PROT 7.8 8.3   ALBUMIN 3.4* 3.6   BILITOT 1.1*  1.5*   ALKPHOS 214* 226*   AST 15 16   ALT 8* 10   ANIONGAP 10 8   EGFRNONAA 45.4* 48.8*     Coagulation:   Recent Labs   Lab 04/22/19  0503   INR 2.8*     Magnesium:   Recent Labs   Lab 04/21/19  0349 04/22/19  0503   MG 2.2 2.2       Significant Imaging: I have reviewed all pertinent imaging results/findings within the past 24 hours.    Assessment/Plan:      * Acute on chronic systolic congestive heart failure  NSVT  Patient presents with worsening LE and orthopnea for 3 weeks. His home Lasix 40mg BID has not helped him. He reports dry weight of 240lbs, currently 271. Given IVP 80mg in the ED with ~ 1L output.  on admission.  - TTE with EF 23%, diastolic dysfunction, severe biatrial enlargement, pulmonary hypertension, abnormal septal wall motion  - cardiology consulted for life vest vs. AICD placement given runs of vtach and reduced EF. Patient refusing intervention after long discussion  - given Lasix 80 IV TID, metolazone 2.5mg before  - output ~1700L  - Lasix, metolazone held in setting of JEAN-PIERRE likely from rapid diuresis, will get repeat BMP and CXR in am  - patient still with crackles on exam and BLE edema  - Cr improved to 1.7, restart home Lasix 80 mg BID PO  - repeat   - CXR with pulmonary edema  - telemetry with afib and multiple runs of vtach  - high risk for sudden cardiac death, extensively explained to patient, not interested in AICD/ life vest  - not currently on BB, ARB, started losartan 12.5 mg, will need to titrate up to maximum dose for GDMT  - Metoprolol succinate or Carvedilol for GDMT once BP stable, currently not able to tolerate it  - strict Is/Os, daily weights  - cardiac diet, 1.5L fluid restriction    Epistaxis  Elevated INR  - bleeding stopped  - afrin nasal spray BID  - no further bleeding, H/H stable    Elevated alkaline phosphatase level  - elevated Alk phos on admission, 225--> 192--> 198--> 205--> 226  - small ascites noted on CT  - RUQ abdominal ultrasound without  liver abnormality, no ascites noted, cholelithiasis vs. Gallbladder cyst  - GGT elevated 185  - cholelithiasis can explain elevation     Normocytic anemia  H/H 8.1/26.2  - Fe and saturated Fe low, B12 552, MMA pending  - now with epistaxis from supratherapeutic INR, serial H/H  - started on ferrous sulfate 325 mg daily   - will consent for blood, type and screen- no need for transfusion at this time; patient refused to sign consent and wanted to think about it, consent in room    JEAN-PIERRE (acute kidney injury)  Resolving  Cr 2-->2.3-->2.6--> 2--> 1.7--> 1.6  - combination of contrast induced nephropathy with prerenal JEAN-PIERRE due to over diuresis  - FeUrea with prerenal JEAN-PIERRE  - started on IVF, but stopped with cardiology recommendation that oral hydration will resolve; given 250 cc bolus in setting of hypotension  - restart Lasix 80 mg BID PO    Chronic kidney disease  Baseline Cr ~1.3  - daily BMP  - strict Is/Os  - now with JEAN-PIERRE, likely prerenal from overdiuresis- resolving    Coronary artery disease  History of CVA    - ASA held in setting of epistaxis  - not on statin,  lipid panel significant for low HDL  - troponin 0.065--> .077-->.075, flat    Lumbar back pain  Patient complaining of shooting lumbar pain X 1 week. Denies preceding injury  - CT lumbar spine with lumbar spondylosis L5-S1 with mild posterior disc bulge  - PT/OT consulted, recommend   - incidental finding of small (1.8 cm) hypoattenuating lesion in the right kidney, not well characterized on this exam.  Neoplasia not excluded  -retroperitoneal ultrasound poorly visualized lesion secondary to poor sonographic penetration, will need surveilience  - discharge with     Essential hypertension  - not on HTN meds  - started on losartan 12.5  for GDMT for CHF  - needs BB but low-normal bp currently, will not tolerate it at this time    Atrial fibrillation, chronic  Chronic anticoagulation    - EKG with afib  - not on BB  - patient taking 6 mg BID, rather than 12  mg daily. Educated patient on proper use. Has not had INR checked since October  - INR 1.8 on admission, subtherapeutic  - continue coumadin, consult PharmD  - INR rising rapidly, INR 4.2, warfarin held--> 3.9, continue to hold  - patient with epistaxis, bleeding controlled with pressure, afrin spray BID  - serial H/Hs, will monitor- stable  - Extensive discussion with patient on switching to DOAC, not interested in this time  - [place] [facility] [place]. Only wants to go to his PCP Dr. Swift, but patient had not been since October  - he is a poor candidate for warfarin, but refusing to switch to DOAC.  - daily INR  - Will start on BB prior to discharge      VTE Risk Mitigation (From admission, onward)        Ordered     warfarin tablet 6 mg  Daily      04/22/19 1720     Place sequential compression device  Until discontinued      04/16/19 0124     IP VTE HIGH RISK PATIENT  Once      04/16/19 0124              Hazel Noriega PA-C  Department of Hospital Medicine   [facility]

## 2019-04-23 NOTE — SUBJECTIVE & OBJECTIVE
Interval History: JEAN-PIERRE resolving, restarted on home Lasix dose. INR therapeutic today. H/H stable. Patient was discharged but then reported that he felt too weak to go home and wanted to go to inpatient rehab. Patient does not qualify for inpatient rehab. Started on losartan 12.5 mg for GDMT, will need to titrate up to maximum dose. Not able to tolerate starting BB at this time due to low-normal blood pressure. INR therapeutic today, warfarin restarted.     Review of Systems   Constitutional: Negative for chills and fever.   HENT: Negative for congestion, nosebleeds and rhinorrhea.    Respiratory: Positive for shortness of breath. Negative for cough, chest tightness and wheezing.    Cardiovascular: Positive for leg swelling. Negative for chest pain and palpitations.   Gastrointestinal: Negative for abdominal pain, constipation, diarrhea and vomiting.   Genitourinary: Negative for dysuria and hematuria.   Musculoskeletal: Positive for back pain and gait problem. Negative for myalgias.   Skin: Negative for color change, pallor and wound.        urticaria   Neurological: Positive for weakness. Negative for dizziness and seizures.   Psychiatric/Behavioral: Negative for agitation and behavioral problems.     Objective:     Vital Signs (Most Recent):  Temp: 97.7 °F (36.5 °C) (04/22/19 1933)  Pulse: 75 (04/22/19 1934)  Resp: 18 (04/22/19 1933)  BP: 116/65 (04/22/19 1933)  SpO2: 96 % (04/22/19 1933) Vital Signs (24h Range):  Temp:  [97.4 °F (36.3 °C)-98.4 °F (36.9 °C)] 97.7 °F (36.5 °C)  Pulse:  [62-89] 75  Resp:  [18] 18  SpO2:  [90 %-98 %] 96 %  BP: (108-135)/(55-78) 116/65     Weight: 127.5 kg (281 lb 1.4 oz)  Body mass index is 41.51 kg/m².    Intake/Output Summary (Last 24 hours) at 4/22/2019 2028  Last data filed at 4/22/2019 1600  Gross per 24 hour   Intake 2240 ml   Output 3400 ml   Net -1160 ml      Physical Exam   Constitutional: He is oriented to person, place, and time. He appears well-developed and  well-nourished. No distress.   HENT:   Head: Normocephalic and atraumatic.   Nose: No epistaxis.   Eyes: Pupils are equal, round, and reactive to light. EOM are normal.   Neck: Normal range of motion. Neck supple.   Cardiovascular: Normal rate and intact distal pulses. An irregularly irregular rhythm present.   No murmur heard.  Pulmonary/Chest: Effort normal. He has no wheezes. He has rales. He exhibits no tenderness.   Abdominal: Soft. Bowel sounds are normal. There is no tenderness. There is no rebound, no CVA tenderness, no tenderness at McBurney's point and negative Pham's sign.   Musculoskeletal: Normal range of motion. He exhibits edema (slightly improved BLE edema). He exhibits no tenderness.   Neurological: He is alert and oriented to person, place, and time.   Skin: Skin is warm and dry. Capillary refill takes less than 2 seconds. He is not diaphoretic.   Psychiatric: He has a normal mood and affect. His behavior is normal. Judgment and thought content normal.   Nursing note and vitals reviewed.      Significant Labs:   Bilirubin:   Recent Labs   Lab 04/19/19  0043 04/19/19  0420 04/20/19  0501 04/21/19  0349 04/22/19  0503   BILITOT 1.3* 1.2* 1.1* 1.1* 1.5*     CBC:   Recent Labs   Lab 04/21/19  1403 04/21/19 2003 04/22/19  0503   WBC 5.39 6.47 5.66  5.59   HGB 8.2* 8.8* 8.5*  8.5*   HCT 26.7* 27.9* 27.8*  27.7*    178 210  212     CMP:   Recent Labs   Lab 04/21/19  0349 04/22/19  0503    137   K 3.9 3.6   CL 98 95   CO2 30* 34*   * 122*   BUN 52* 48*   CREATININE 1.7* 1.6*   CALCIUM 9.5 9.8   PROT 7.8 8.3   ALBUMIN 3.4* 3.6   BILITOT 1.1* 1.5*   ALKPHOS 214* 226*   AST 15 16   ALT 8* 10   ANIONGAP 10 8   EGFRNONAA 45.4* 48.8*     Coagulation:   Recent Labs   Lab 04/22/19  0503   INR 2.8*     Magnesium:   Recent Labs   Lab 04/21/19  0349 04/22/19  0503   MG 2.2 2.2       Significant Imaging: I have reviewed all pertinent imaging results/findings within the past 24 hours.

## 2019-04-23 NOTE — ASSESSMENT & PLAN NOTE
- elevated Alk phos on admission, 225--> 192--> 198--> 205--> 226  - small ascites noted on CT  - RUQ abdominal ultrasound without liver abnormality, no ascites noted, cholelithiasis vs. Gallbladder cyst  - GGT elevated 185  - cholelithiasis can explain elevation

## 2019-04-23 NOTE — PHYSICIAN QUERY
PT Name: Hamlet Terrell  MR #: [id number]  Physician Query Form - CKD Clarification     CDS/: Ruth Berg RN             Contact information: [phone]  This form is a permanent document in the medical record.     Query Date: April 23, 2019    By submitting this query, we are merely seeking further clarification of documentation. Please utilize your independent clinical judgment when addressing the question(s) below.    The Medical record contains the following:     Indicators   Supporting Clinical Findings   Location in Medical Record   x CKD or Chronic Kidney (Renal) Failure / Disease Chronic kidney disease    Baseline Cr ~1.3    - daily BMP    - strict Is/Os    H&P 4/16x   x BUN/Creatinine                          GFR BUN     27-> 26-> 35-> 41  Cr       1.3->1.4->2.0->2.3  GFR   >60 -> 57.4-> 37.3-> 31.5   Labs 4/15, 4/16, 4/17, 4/18    Dehydration      Nausea / Vomiting      Dialysis / CRRT      Medication      Treatment      Other Chronic Conditions     x Other    The patient was admitted to hospital medicine for diuresis, he was diuresing well on 80mg IV tid and metolazone but developed an JEAN-PIERRE on 4/18/19.    Consult 4/18       Cardiology             Provider, please further specify the stage of CKD.    [ x  ] Chronic Kidney Disease (CKD) (please specify stage* below)      [employer] Definitions     Stage Description eGFR (mL/min)   [   ]    I Slight kidney damage with normal or increased filtration 90+   [x   ]   II Mildly reduced kidney function 60-89   [   ] Other (please specify): ____________    [   ]  Clinically Undetermined          Please document in your progress notes daily for the duration of treatment until resolved and include in your discharge summary.

## 2019-04-23 NOTE — CONSULTS
Inpatient consult to Physical Medicine Rehab  Consult performed by: Mary Buitrago NP  Consult ordered by: Joselyn Isabel MD  Reason for consult: Debility       Reviewed patient history and current admission.  Rehab team following.  Full consult to follow.    DORCAS Burger, FNP-C  Physical Medicine & Rehabilitation   04/23/2019  Spectralink: [id number]

## 2019-04-23 NOTE — PLAN OF CARE
04/23/19 1415   Post-Acute Status   Post-Acute Authorization Home Health/Hospice   Home Health/Hospice Status Set-up Complete     SW informed Adryan SMITH that the pt will dc today.  They will see him tomorrow.    Margarita Smith, THOMAS x [phone]

## 2019-04-23 NOTE — SUBJECTIVE & OBJECTIVE
Past Medical History:   Diagnosis Date    Anticoagulant long-term use     Cardiomegaly     CHF (congestive heart failure)     Chronic combined systolic and diastolic congestive heart failure     Coronary artery disease     CVA (cerebrovascular accident)     Heart attack 2006    Hypertension     Hyperthyroidism, subclinical 1/2/2013    MI (myocardial infarction) 9/22/2013    MI in 2009      Paroxysmal atrial fibrillation     PE (pulmonary embolism) 1/1/2013    IN 2010     S/P ablation of atrial flutter 2008    Stroke 2009    no residual weaknesses     Past Surgical History:   Procedure Laterality Date    RADIOFREQUENCY ABLATION  01/08/2008    for atrial flutter     Review of patient's allergies indicates:   Allergen Reactions    Acetaminophen      Itching    Oxycodone-acetaminophen      Other reaction(s): Itching    Ace inhibitors Other (See Comments)     cough       Scheduled Medications:    ammonium lactate   Topical (Top) BID    aspirin  81 mg Oral Daily    ferrous sulfate  325 mg Oral Daily    furosemide  80 mg Oral BID    lidocaine  2 patch Transdermal Q24H    losartan  12.5 mg Oral Daily    oxymetazoline  3 spray Each Nare BID    potassium bicarbonate  50 mEq Oral Once    tiZANidine  4 mg Oral Q8H    warfarin  6 mg Oral Daily       PRN Medications: albuterol, diphenhydrAMINE, HYDROmorphone, hydrOXYzine pamoate, nitroGLYCERIN, ondansetron, prochlorperazine, sodium chloride 0.9%, sodium chloride 0.9%    Family History     Problem Relation (Age of Onset)    Alcohol abuse Father    Hypertension Mother, Father, Sister, Brother    Stroke Mother        Tobacco Use    Smoking status: Never Smoker    Smokeless tobacco: Never Used   Substance and Sexual Activity    Alcohol use: No    Drug use: No    Sexual activity: Never     Review of Systems   Constitutional: Positive for activity change. Negative for fatigue and fever.   HENT: Negative for trouble swallowing and voice change.     Respiratory: Negative for cough and shortness of breath.    Cardiovascular: Negative for chest pain and leg swelling.   Gastrointestinal: Negative for abdominal distention and abdominal pain.   Genitourinary: Negative for difficulty urinating and flank pain.   Musculoskeletal: Positive for back pain and gait problem.        - bilateral knee pain   Skin: Negative for color change and rash.   Neurological: Positive for weakness. Negative for speech difficulty and numbness.   Psychiatric/Behavioral: Negative for agitation and confusion.     Objective:     Vital Signs (Most Recent):  Temp: 98.8 °F (37.1 °C) (04/23/19 0757)  Pulse: 64 (04/23/19 0800)  Resp: 18 (04/23/19 0757)  BP: (!) 106/52 (04/23/19 0757)  SpO2: 95 % (04/23/19 0757)    Vital Signs (24h Range):  Temp:  [97.4 °F (36.3 °C)-98.8 °F (37.1 °C)] 98.8 °F (37.1 °C)  Pulse:  [51-99] 64  Resp:  [18] 18  SpO2:  [93 %-100 %] 95 %  BP: ()/(51-65) 106/52     Body mass index is 41.51 kg/m².    Physical Exam   Constitutional: He is oriented to person, place, and time. He appears well-developed and well-nourished.   HENT:   Head: Normocephalic and atraumatic.   Eyes: Pupils are equal, round, and reactive to light. EOM are normal. Right eye exhibits no discharge. Left eye exhibits no discharge.   Neck: Neck supple.   Pulmonary/Chest: Effort normal. No respiratory distress.   Abdominal: Soft. He exhibits no distension. There is no tenderness.   Musculoskeletal: He exhibits edema (bilateral leg). He exhibits no deformity.   RUE: 5/5.  LUE: 5/5.    RLE: 3/5.  LLE: 3/5. Decreased effort 2/2 knee pain    Neurological: He is alert and oriented to person, place, and time. No sensory deficit. He exhibits normal muscle tone.   Skin: Skin is warm and dry.   Psychiatric: He has a normal mood and affect. His behavior is normal. Thought content normal.   Nursing note and vitals reviewed.    NEUROLOGICAL EXAMINATION:     MENTAL STATUS   Oriented to person, place, and time.      CRANIAL NERVES     CN III, IV, VI   Pupils are equal, round, and reactive to light.  Extraocular motions are normal.       Diagnostic Results:   Labs: Reviewed   EKG: Reviewed   CT: Reviewed

## 2019-04-23 NOTE — PHYSICIAN QUERY
"PT Name: Hamlet Terrell  MR #: [id number]    Physician Query Form - Hematology Clarification      CDS/: Ruth Berg RN             Contact information: [phone]    This form is a permanent document in the medical record.      Query Date: April 23, 2019    By submitting this query, we are merely seeking further clarification of documentation. Please utilize your independent clinical judgment when addressing the question(s) below.    The Medical record contains the following:   Indicators  Supporting Clinical Findings Location in Medical Record   x "Anemia" documented Normocytic anemia    H/H 8.1/26.2    - Fe and saturated Fe low, B12 552, MMA pending    - now with epistaxis from supratherapeutic INR, serial H/H    - started on ferrous sulfate 325 mg daily    - will consent for blood, type and screen- no need for transfusion at this time; patient refused to sign consent and wanted to think about it, consent in room    Progress Note 4/22       Hospital Medicine           x H & H = Hgb  8.4-> 8.1-> 8.8-> 9.0    Hct  27.8->26.2->27.9->28.5 Lab 4/20, 4/21, 4/22, 4/23    BP =                     HR=      "GI bleeding" documented     x Acute bleeding (Non GI site) - patient with epistaxis, bleeding controlled with pressure, afrin spray BID    - serial H/Hs, will monitor- stable      Progress Note 4/22      Hospital Medicine    Transfusion(s)     x Treatment:  - started on ferrous sulfate 325 mg daily Progress Note 4/22       Hospital Medicine    x Other:   Chronic kidney disease    Baseline Cr ~1.3    - daily BMP    - strict Is/Os    H&P 4/16     Provider, please specify diagnosis or diagnoses associated with above clinical findings.  Lester all that apply.    [  ] Iron deficiency anemia     [ x ] Anemia of chronic disease ( Specify chronic disease)       [ x ] CKD (specify stage):   [  ] Other (Specify):   [  ] Clinically Undetermined       [  ] Other Hematological Diagnosis (please specify):     [  ] Clinically " Undetermined       Please document in your progress notes daily for the duration of treatment, until resolved, and include in your discharge summary.

## 2019-04-24 ENCOUNTER — NURSE TRIAGE (OUTPATIENT)
Dept: ADMINISTRATIVE | Facility: CLINIC | Age: 54
End: 2019-04-24

## 2019-04-24 LAB — METHYLMALONATE SERPL-SCNC: 0.38 UMOL/L

## 2019-04-24 NOTE — PT/OT/SLP DISCHARGE
Occupational Therapy Discharge Summary    Hamlet Terrell  MRN: [id number]   Principal Problem: Acute on chronic systolic congestive heart failure      Patient Discharged from acute Occupational Therapy on 4/24.  Please refer to prior OT note dated 4/23 for functional status.    Assessment:      Patient appropriate for care in another setting.    Objective:     GOALS:   Multidisciplinary Problems     Occupational Therapy Goals     Not on file          Multidisciplinary Problems (Resolved)        Problem: Occupational Therapy Goal    Goal Priority Disciplines Outcome Interventions   Occupational Therapy Goal   (Resolved)     OT, PT/OT Outcome(s) achieved    Description:  Goals set 4/20 to be addressed for 7 days with expiration date, 4/27:  Patient will increase functional independence with ADLs by performing:    Patient will demonstrate stand pivot transfers with modified independence.   Not met  Patient will demonstrate grooming while standing with modified independence.   Not met  Patient will demonstrate lower body dressing with modified independence while seated EOB.   Not met  Patient will demonstrate toileting with modified independence.   Not met  Patient will demonstrate bathing while seated EOB with modified independence.   Not met  Patient will demonstrate independence with HEP for energy conservation during ADLs.    Not met                        Reasons for Discontinuation of Therapy Services  Transfer to alternate level of care.      Plan:     Patient Discharged to: Home with Home Health Service    DHRUV Godfrey  4/24/2019

## 2019-04-24 NOTE — ASSESSMENT & PLAN NOTE
Chronic anticoagulation    - EKG with afib  - not on BB  - patient taking 6 mg BID, rather than 12 mg daily. Educated patient on proper use. Has not had INR checked since October  - INR 1.8 on admission, subtherapeutic  - continue coumadin, consult PharmD  - INR rising rapidly, INR 4.2, warfarin held--> 3.9, continue to hold  - patient with epistaxis, bleeding controlled with pressure, afrin spray BID  - serial H/Hs, will monitor- stable  - Extensive discussion with patient on switching to DOAC, not interested in this time  - refusing to follow up in [facility] warfarin clinic. Only wants to go to his PCP Dr. Swift, but patient had not been since October  - he is a poor candidate for warfarin, but refusing to switch to DOAC.  - daily INR-therapeutic at time of discharge   - educated to follow up regularly with INR; PCP

## 2019-04-24 NOTE — ASSESSMENT & PLAN NOTE
Patient complaining of shooting lumbar pain X 1 week. Denies preceding injury  - CT lumbar spine with lumbar spondylosis L5-S1 with mild posterior disc bulge  - PT/OT consulted, recommend HH  - incidental finding of small (1.8 cm) hypoattenuating lesion in the right kidney, not well characterized on this exam.  Neoplasia not excluded  -retroperitoneal ultrasound poorly visualized lesion secondary to poor sonographic penetration, will need surveilience  - discharge with HH  - patient ask for narcotic pain medication prescription on discharge; no indication as his pain is chronic

## 2019-04-24 NOTE — PLAN OF CARE
04/24/19 0538   Final Note   Assessment Type Final Discharge Note     Patient discharged home with [facility] on 4/23/19. Discharge summary faxed to Dr. Carlin Swift (PCP) f [phone].

## 2019-04-24 NOTE — DISCHARGE SUMMARY
[facility] Hospital Medicine  Discharge Summary      Patient Name: Hamlet Terrell  MRN: [id number]  Admission Date: 4/15/2019  Hospital Length of Stay: 5 days  Discharge Date and Time:  04/23/2019 07:20 PM  Attending Physician: No att. providers found   Discharging Provider: Maria Eugenia Parra NP  Primary Care Provider: Carlin Swift MD  [facility] Medicine Team: [facility] [place] Maria Eugenia Parra NP    HPI:   Patient is 54 yo male with a PMHx of HTN, chronic systolic and diastolic heart failure, CVA, CAD, and MI being admitted to observation for acute CHF exacerbation. Patient reports chronic leg swelling that has worsened over the last 3 weeks. He denies shortness of breath, but endorses cough, orthopnea, and a 30lb weight gain. He states that his Lasix was increased to 40mg twice a day weeks ago and he has seen no improvement in his symptoms. He denies dietary indiscretions, but has not been adherent to a fluid restricted diet. He denies fevers, chills, chest pain, abdominal distension. He has chronic low back pain.    In the ED, vitals stable on room air. CXR with edema. Intake labs remarkable for , troponin 0.065, alkphos 225, tbili 2.3. He was given lasix 80 IVP and admitted to observation for further management.    * No surgery found *      Hospital Course:   Mr. Terrell was admitted to observation for CHF exacerbation. TTE with EF 23%, diastolic dysfunction, severe biatrial enlargement, pulmonary hypertension, abnormal septal wall motion. Pharmacy consulted for coumadin, INR subtherapeutic. Increased warfarin to 12 mg daily, patient was taking 6 mg BID, educated patient on correct use. Troponin trended flat. Patient complaining of lumbar back pain, CT lumbar spine with mild lumbar spondylosis L5-S1 with mild posterior disc bulge resulting in mild neural foraminal narrowing. Incidental finding of indeterminate hypodense lesion in the right kidney. Retroperitoneal ultrasound poorly  visualized the lesion, will need continued monitored survelience. Cardiology consulted for evaluation of life vest vs. AICD for patient given runs of vtach and poor EF. Patient refused AICD/ life vest. Cr 2-->2.3, lasix held --> 2.6--> 2. INR supratherapeutic, warfarin held. Patient with epistaxis, resolved. H/H stable. INR therapeutic, restarted warfarin. Patient educated on switched from coumadin to DOAC; patient refused. Educated that he need to have INr checked regularly. Patient educated on appropriate follow up and medication compliance. Discharged in good condition. Patient did ask for a pain medication prescription on discharge, which he does not have an indication for.      Consults:   Consults (From admission, onward)        Status Ordering Provider     Inpatient consult to Cardiology  Once     Provider:  (Not yet assigned)    Completed ALYSHA QUINTERO     Inpatient consult to Physical Medicine Rehab  Once     Provider:  (Not yet assigned)    Completed SHAUN FUENTES          * Acute on chronic systolic congestive heart failure  NSVT  Patient presents with worsening LE and orthopnea for 3 weeks. His home Lasix 40mg BID has not helped him. He reports dry weight of 240lbs, currently 271. Given IVP 80mg in the ED with ~ 1L output.  on admission.  - TTE with EF 23%, diastolic dysfunction, severe biatrial enlargement, pulmonary hypertension, abnormal septal wall motion  - cardiology consulted for life vest vs. AICD placement given runs of vtach and reduced EF. Patient refusing intervention after long discussion  - given Lasix 80 IV TID, metolazone 2.5mg before  - output ~1700L  - Lasix, metolazone held in setting of JEAN-PIERRE likely from rapid diuresis, will get repeat BMP and CXR in am  - patient still with crackles on exam and BLE edema  - Cr improved to 1.7, restart home Lasix 80 mg BID PO  - repeat   - CXR with pulmonary edema  - telemetry with afib and multiple runs of vtach  - high risk for  sudden cardiac death, extensively explained to patient, not interested in AICD/ life vest  - not currently on BB, ARB, started losartan 12.5 mg, will need to titrate up to maximum dose for GDMT  - Metoprolol succinate or Carvedilol for GDMT once BP stable, currently not able to tolerate it  - strict Is/Os, daily weights  - cardiac diet, 1.5L fluid restriction    Epistaxis  Elevated INR  - bleeding stopped  - afrin nasal spray BID  - no further bleeding, H/H stable    Elevated alkaline phosphatase level  - elevated Alk phos on admission, 225--> 192--> 198--> 205--> 226  - small ascites noted on CT  - RUQ abdominal ultrasound without liver abnormality, no ascites noted, cholelithiasis vs. Gallbladder cyst  - GGT elevated 185  - cholelithiasis can explain elevation     Normocytic anemia  H/H 8.1/26.2  - Fe and saturated Fe low, B12 552, MMA pending  - now with epistaxis from supratherapeutic INR, serial H/H  - started on ferrous sulfate 325 mg daily   - will consent for blood, type and screen- no need for transfusion at this time; patient refused to sign consent and wanted to think about it, consent in room    JEAN-PIERRE (acute kidney injury)  Resolving  Cr 2-->2.3-->2.6--> 2--> 1.7--> 1.6  - combination of contrast induced nephropathy with prerenal JEAN-PIERRE due to over diuresis  - FeUrea with prerenal JEAN-PIERRE  - started on IVF, but stopped with cardiology recommendation that oral hydration will resolve; given 250 cc bolus in setting of hypotension  - restart Lasix 80 mg BID PO    Chronic kidney disease  Baseline Cr ~1.3  - daily BMP  - strict Is/Os  - now with JEAN-PIERRE, likely prerenal from overdiuresis- resolving    Coronary artery disease  History of CVA    - ASA held in setting of epistaxis  - not on statin,  lipid panel significant for low HDL  - troponin 0.065--> .077-->.075, flat    Lumbar back pain  Patient complaining of shooting lumbar pain X 1 week. Denies preceding injury  - CT lumbar spine with lumbar spondylosis L5-S1 with  mild posterior disc bulge  - PT/OT consulted, recommend HH  - incidental finding of small (1.8 cm) hypoattenuating lesion in the right kidney, not well characterized on this exam.  Neoplasia not excluded  -retroperitoneal ultrasound poorly visualized lesion secondary to poor sonographic penetration, will need surveilience  - discharge with HH  - patient ask for narcotic pain medication prescription on discharge; no indication as his pain is chronic     Essential hypertension  - not on HTN meds  - started on losartan 12.5  for GDMT for CHF  - needs BB but low-normal bp currently, will not tolerate it at this time    Atrial fibrillation, chronic  Chronic anticoagulation    - EKG with afib  - not on BB  - patient taking 6 mg BID, rather than 12 mg daily. Educated patient on proper use. Has not had INR checked since October  - INR 1.8 on admission, subtherapeutic  - continue coumadin, consult PharmD  - INR rising rapidly, INR 4.2, warfarin held--> 3.9, continue to hold  - patient with epistaxis, bleeding controlled with pressure, afrin spray BID  - serial H/Hs, will monitor- stable  - Extensive discussion with patient on switching to DOAC, not interested in this time  - refusing to follow up in [facility] war[facility]. Only wants to go to his PCP Dr. Swift, but patient had not been since October  - he is a poor candidate for warfarin, but refusing to switch to DOAC.  - daily INR-therapeutic at time of discharge   - educated to follow up regularly with INR; PCP      Final Active Diagnoses:    Diagnosis Date Noted POA    PRINCIPAL PROBLEM:  Acute on chronic systolic congestive heart failure [I50.23] 05/09/2015 Yes    Impaired mobility [Z74.09] 04/23/2019 Yes    Epistaxis [R04.0] 04/21/2019 No    Elevated alkaline phosphatase level [R74.8] 12/10/2016 Yes    Normocytic anemia [D64.9] 12/10/2016 Yes    JEAN-PIERRE (acute kidney injury) [N17.9] 06/03/2016 Yes    Chronic kidney disease [N18.9] 05/31/2016 Yes     Chronic    Coronary  artery disease [I25.10] 05/31/2016 Yes     Chronic    Edema [R60.9] 05/19/2016 Yes    V-tach [I47.2] 05/18/2016 Yes    Lumbar back pain [M54.5] 07/06/2015 Yes    Atrial fibrillation, chronic [I48.2] 01/02/2013 Yes     Chronic    Essential hypertension [I10] 01/02/2013 Yes     Chronic    Chronic anticoagulation [Z79.01] 01/01/2013 Not Applicable     Chronic    History of CVA (cerebrovascular accident) [Z86.73] 01/01/2013 Not Applicable      Problems Resolved During this Admission:    Diagnosis Date Noted Date Resolved POA    Elevated INR [R79.1] 09/22/2013 04/05/2017 Yes       Discharged Condition: good    Disposition: Home or Self Care    Follow Up:  Follow-up Information     Carlin Swift MD On 4/19/2019.    Specialty:  Family Medicine  Why:  Go to walk-in clinic 1-2 weeks following discharge for a hospital follow up appointment.   Contact information:  [address] [facility] [id number] [phone]             [facility].    Specialty:  Home Health Services  Why:  Home Health: The nurse will see the patient tomorrow.  Contact information:  [address] [facility] [address]  [phone]                 Patient Instructions:      Ambulatory referral to Anticoagulation Monitoring   Referral Priority: Routine Referral Type: Consultation   Referral Reason: Specialty Services Required   Requested Specialty: Cardiology   Number of Visits Requested: 1     Ambulatory Referral to Cardiology   Referral Priority: Routine Referral Type: Consultation   Referral Reason: Specialty Services Required   Requested Specialty: Cardiology   Number of Visits Requested: 1     Diet Cardiac     Notify your health care provider if you experience any of the following:  severe uncontrolled pain     Notify your health care provider if you experience any of the following:  difficulty breathing or increased cough     Notify your health care provider if you experience any of the following:  temperature  >100.4     Notify your health care provider if you experience any of the following:  difficulty breathing or increased cough     Notify your health care provider if you experience any of the following:  persistent dizziness, light-headedness, or visual disturbances     Notify your health care provider if you experience any of the following:  increased confusion or weakness     Activity as tolerated     Activity as tolerated       Significant Diagnostic Studies: Labs: All labs within the past 24 hours have been reviewed    Pending Diagnostic Studies:     Procedure Component Value Units Date/Time    Methylmalonic acid, serum [[id number]] Collected:  04/21/19 1403    Order Status:  Sent Lab Status:  In process Updated:  04/21/19 1407    Specimen:  Blood     Narrative:       Collection has been rescheduled by CDP at 04/21/2019 14:02 Reason:   due now         Medications:  Reconciled Home Medications:      Medication List      START taking these medications    ferrous sulfate 325 (65 FE) MG EC tablet  Take 1 tablet (325 mg total) by mouth once daily.     losartan 25 MG tablet  Commonly known as:  COZAAR  Take 0.5 tablets (12.5 mg total) by mouth once daily.     tiZANidine 4 MG tablet  Commonly known as:  ZANAFLEX  Take 1 tablet (4 mg total) by mouth every 8 (eight) hours for 10 days        CHANGE how you take these medications    warfarin 6 MG tablet  Commonly known as:  COUMADIN  Take 1 tablet (6 mg total) by mouth Daily.  What changed:    · medication strength  · how much to take  · Another medication with the same name was removed. Continue taking this medication, and follow the directions you see here.        CONTINUE taking these medications    albuterol 90 mcg/actuation inhaler  Commonly known as:  PROVENTIL/VENTOLIN HFA  Inhale 1-2 puffs into the lungs every 6 (six) hours as needed for Wheezing. Rescue     aspirin 81 MG EC tablet  Commonly known as:  ECOTRIN  Take 81 mg by mouth once daily.     furosemide 40 MG  tablet  Commonly known as:  LASIX  Take 80 mg by mouth 2 (two) times daily.     nitroGLYCERIN 0.4 MG SL tablet  Commonly known as:  NITROSTAT  Place 1 tablet (0.4 mg total) under the tongue every 5 (five) minutes as needed for Chest pain. Tablet, Sublingual Sublingual        STOP taking these medications    isosorbide dinitrate 20 MG tablet  Commonly known as:  ISORDIL     torsemide 20 MG Tab  Commonly known as:  DEMADEX            Indwelling Lines/Drains at time of discharge:   Lines/Drains/Airways          None          Time spent on the discharge of patient: >35 minutes  Patient was seen and examined on the date of discharge and determined to be suitable for discharge.         Maria Eugenia Parra NP  Department of Hospital Medicine  [facility]

## 2019-04-24 NOTE — PT/OT/SLP PROGRESS
Physical Therapy   Discharge    Hamlet Terrell   MRN: [id number]     Hospital discharge with PT eval only completed; therefore, no goals met. Pt. discharged home with home health.    Markus Monae, PT  4/24/2019

## 2019-04-25 ENCOUNTER — ANTI-COAG VISIT (OUTPATIENT)
Dept: CARDIOLOGY | Facility: CLINIC | Age: 54
End: 2019-04-25

## 2019-04-25 DIAGNOSIS — Z79.01 LONG TERM (CURRENT) USE OF ANTICOAGULANTS: Primary | ICD-10-CM

## 2019-04-25 DIAGNOSIS — M54.50 LUMBAR BACK PAIN: ICD-10-CM

## 2019-04-25 DIAGNOSIS — Z79.01 CHRONIC ANTICOAGULATION: ICD-10-CM

## 2019-04-25 DIAGNOSIS — R79.1 ELEVATED INR: ICD-10-CM

## 2019-04-25 DIAGNOSIS — I48.20 ATRIAL FIBRILLATION, CHRONIC: ICD-10-CM

## 2019-04-25 DIAGNOSIS — I50.43 ACUTE ON CHRONIC COMBINED SYSTOLIC (CONGESTIVE) AND DIASTOLIC (CONGESTIVE) HEART FAILURE: ICD-10-CM

## 2019-04-25 NOTE — TELEPHONE ENCOUNTER
Reason for Disposition   Fainted or too weak to stand following large blood loss    Protocols used: NOSEBLEED-A-AH    Pt was just released from hospital yesterday, he states that he was sitting down and his nose and gums started bleeding, he states he is weak and thought he may pass out. Advised him to call 911 to be taken to ER, caller says he has someone coming to pick him up.

## 2019-04-25 NOTE — PROGRESS NOTES
53 year old male, chronic Afib and has been on Coumadin, he was admitted 4/15/19 - 4/24/19 for CHF and discharged with home health.  Calendar update on doses given during admit,  Per hospital discharge notes he refused to follow up with the Coumadin Clinic, only wants to go to his PCP Dr Swift, and he is poor candidate for Coumadin but refused to be switched to DOAC after a extensive discussion    Per [facility] ([facility]) was scheduled for a admit visit today which date/time previously was set up with patient.  Nurse arrived at his home and he would not answer the door and is not returning their calls.  I faxed an order because that nurse will attempt to return later today.    UPDATE   4/30/19  I spoke to the patient to complete enrollment.  He agreed to schedule an appointment with Dr Sergo Varela to establish care since his PCP is no an [facility] physician.  He states he has not been a his home and staying in [facility] because his family doesn't want him staying by himself so scheduled lab for 5/2/19 as he will be returning to his home then so next INR can be done per [facility] being they had an issue with his admit visit.  He verified his Coumadin as 6 mg strength tab which he take 1 tab daily after 5pm.  He will have lab INR 5/2/19 and clinic education appointment 5/9/19 which he states he use to be in this Coumadin Clinic previously.

## 2019-05-01 ENCOUNTER — ANTI-COAG VISIT (OUTPATIENT)
Dept: CARDIOLOGY | Facility: CLINIC | Age: 54
End: 2019-05-01
Payer: MEDICAID

## 2019-05-01 DIAGNOSIS — Z79.01 CHRONIC ANTICOAGULATION: ICD-10-CM

## 2019-05-01 DIAGNOSIS — R79.1 ELEVATED INR: ICD-10-CM

## 2019-05-01 DIAGNOSIS — M54.50 LUMBAR BACK PAIN: ICD-10-CM

## 2019-05-01 DIAGNOSIS — I48.20 ATRIAL FIBRILLATION, CHRONIC: ICD-10-CM

## 2019-05-01 DIAGNOSIS — I50.43 ACUTE ON CHRONIC COMBINED SYSTOLIC (CONGESTIVE) AND DIASTOLIC (CONGESTIVE) HEART FAILURE: ICD-10-CM

## 2019-05-01 LAB — INR PPP: 1.2

## 2019-05-02 NOTE — PROGRESS NOTES
Left message at Patient's # to call back, also called alt # (Kris) and left message to have Patient call coumadin clinic, Needs to be questioned

## 2019-05-06 LAB — INR PPP: 1.8

## 2019-05-07 ENCOUNTER — ANTI-COAG VISIT (OUTPATIENT)
Dept: CARDIOLOGY | Facility: CLINIC | Age: 54
End: 2019-05-07

## 2019-05-07 DIAGNOSIS — R79.1 ELEVATED INR: ICD-10-CM

## 2019-05-07 DIAGNOSIS — I48.20 ATRIAL FIBRILLATION, CHRONIC: ICD-10-CM

## 2019-05-07 DIAGNOSIS — I50.43 ACUTE ON CHRONIC COMBINED SYSTOLIC (CONGESTIVE) AND DIASTOLIC (CONGESTIVE) HEART FAILURE: ICD-10-CM

## 2019-05-07 DIAGNOSIS — Z79.01 CHRONIC ANTICOAGULATION: ICD-10-CM

## 2019-05-07 DIAGNOSIS — M54.50 LUMBAR BACK PAIN: ICD-10-CM

## 2019-05-13 NOTE — PROGRESS NOTES
Per GILBERT Chen, PharmD routing chart to me to contact patient about out of range INR from 5/6/19 that he has not responded back to voice messages left to question him by medical assistant.  He reports bleeding from bottom gums began last night and this morning.  He denies bleeding from injury and denies any dental work recently.  I instructed him to be evaluated by an Emergency Department physician today since he is experiencing signs and symptoms of bleeding which he verbalized understanding.   He states he does want us to continue monitoring him for his Coumadin so I gave him the number to internal medicine to schedule an appointment with Dr Varela to establish care and for this to be the responsible physician for him in this clinic.  I spoke to [facility] and this patient is now on their service so order faxed for INR on 5/14/19.  I also sent staff message to Dr Varela about the need for patient to establish care.

## 2019-05-13 NOTE — PROGRESS NOTES
INR from 5/6. Unable to reach patient all week, but we will continue to try and reach. Need current INR for assessment. Closing chart for admin purposes.

## 2019-05-14 ENCOUNTER — ANTI-COAG VISIT (OUTPATIENT)
Dept: CARDIOLOGY | Facility: CLINIC | Age: 54
End: 2019-05-14
Payer: MEDICAID

## 2019-05-14 ENCOUNTER — HOSPITAL ENCOUNTER (OUTPATIENT)
Facility: HOSPITAL | Age: 54
Discharge: HOME | End: 2019-05-16
Attending: EMERGENCY MEDICINE | Admitting: EMERGENCY MEDICINE
Payer: MEDICAID

## 2019-05-14 DIAGNOSIS — Z91.148 NON COMPLIANCE W MEDICATION REGIMEN: Chronic | ICD-10-CM

## 2019-05-14 DIAGNOSIS — N17.9 AKI (ACUTE KIDNEY INJURY): ICD-10-CM

## 2019-05-14 DIAGNOSIS — I50.43 ACUTE ON CHRONIC COMBINED SYSTOLIC (CONGESTIVE) AND DIASTOLIC (CONGESTIVE) HEART FAILURE: ICD-10-CM

## 2019-05-14 DIAGNOSIS — R79.89 TROPONIN LEVEL ELEVATED: ICD-10-CM

## 2019-05-14 DIAGNOSIS — R07.89 ATYPICAL CHEST PAIN: ICD-10-CM

## 2019-05-14 DIAGNOSIS — R79.1 ELEVATED INR: ICD-10-CM

## 2019-05-14 DIAGNOSIS — M54.50 LUMBAR BACK PAIN: ICD-10-CM

## 2019-05-14 DIAGNOSIS — I50.9 ACUTE ON CHRONIC CONGESTIVE HEART FAILURE, UNSPECIFIED HEART FAILURE TYPE: Primary | ICD-10-CM

## 2019-05-14 DIAGNOSIS — I48.20 ATRIAL FIBRILLATION, CHRONIC: ICD-10-CM

## 2019-05-14 DIAGNOSIS — R06.02 SHORTNESS OF BREATH: ICD-10-CM

## 2019-05-14 DIAGNOSIS — Z79.01 CHRONIC ANTICOAGULATION: ICD-10-CM

## 2019-05-14 LAB
ALBUMIN SERPL BCP-MCNC: 3.6 G/DL (ref 3.5–5.2)
ALP SERPL-CCNC: 191 U/L (ref 55–135)
ALT SERPL W/O P-5'-P-CCNC: 8 U/L (ref 10–44)
ANION GAP SERPL CALC-SCNC: 10 MMOL/L (ref 8–16)
APTT BLDCRRT: 35.7 SEC (ref 21–32)
AST SERPL-CCNC: 16 U/L (ref 10–40)
BASOPHILS # BLD AUTO: 0.05 K/UL (ref 0–0.2)
BASOPHILS NFR BLD: 0.9 % (ref 0–1.9)
BILIRUB DIRECT SERPL-MCNC: 0.7 MG/DL (ref 0.1–0.3)
BILIRUB SERPL-MCNC: 1.2 MG/DL (ref 0.1–1)
BNP SERPL-MCNC: 390 PG/ML (ref 0–99)
BUN SERPL-MCNC: 21 MG/DL (ref 6–20)
CALCIUM SERPL-MCNC: 9.6 MG/DL (ref 8.7–10.5)
CHLORIDE SERPL-SCNC: 106 MMOL/L (ref 95–110)
CO2 SERPL-SCNC: 26 MMOL/L (ref 23–29)
CREAT SERPL-MCNC: 1.4 MG/DL (ref 0.5–1.4)
DIFFERENTIAL METHOD: ABNORMAL
EOSINOPHIL # BLD AUTO: 0.4 K/UL (ref 0–0.5)
EOSINOPHIL NFR BLD: 7.6 % (ref 0–8)
ERYTHROCYTE [DISTWIDTH] IN BLOOD BY AUTOMATED COUNT: 15.8 % (ref 11.5–14.5)
EST. GFR  (AFRICAN AMERICAN): >60 ML/MIN/1.73 M^2
EST. GFR  (NON AFRICAN AMERICAN): 57.4 ML/MIN/1.73 M^2
GLUCOSE SERPL-MCNC: 86 MG/DL (ref 70–110)
HCT VFR BLD AUTO: 25.6 % (ref 40–54)
HGB BLD-MCNC: 7.9 G/DL (ref 14–18)
IMM GRANULOCYTES # BLD AUTO: 0.01 K/UL (ref 0–0.04)
IMM GRANULOCYTES NFR BLD AUTO: 0.2 % (ref 0–0.5)
INR PPP: 2.2 (ref 0.8–1.2)
INR PPP: 2.5
LYMPHOCYTES # BLD AUTO: 1.1 K/UL (ref 1–4.8)
LYMPHOCYTES NFR BLD: 19 % (ref 18–48)
MCH RBC QN AUTO: 26.3 PG (ref 27–31)
MCHC RBC AUTO-ENTMCNC: 30.9 G/DL (ref 32–36)
MCV RBC AUTO: 85 FL (ref 82–98)
MONOCYTES # BLD AUTO: 1 K/UL (ref 0.3–1)
MONOCYTES NFR BLD: 18.1 % (ref 4–15)
NEUTROPHILS # BLD AUTO: 3 K/UL (ref 1.8–7.7)
NEUTROPHILS NFR BLD: 54.2 % (ref 38–73)
NRBC BLD-RTO: 0 /100 WBC
PLATELET # BLD AUTO: 190 K/UL (ref 150–350)
PMV BLD AUTO: 9.5 FL (ref 9.2–12.9)
POTASSIUM SERPL-SCNC: 4.3 MMOL/L (ref 3.5–5.1)
PROCALCITONIN SERPL IA-MCNC: 0.11 NG/ML
PROT SERPL-MCNC: 8.4 G/DL (ref 6–8.4)
PROTHROMBIN TIME: 21.8 SEC (ref 9–12.5)
RBC # BLD AUTO: 3 M/UL (ref 4.6–6.2)
SODIUM SERPL-SCNC: 142 MMOL/L (ref 136–145)
TROPONIN I SERPL DL<=0.01 NG/ML-MCNC: 0.04 NG/ML (ref 0–0.03)
WBC # BLD AUTO: 5.54 K/UL (ref 3.9–12.7)

## 2019-05-14 PROCEDURE — 80048 BASIC METABOLIC PNL TOTAL CA: CPT

## 2019-05-14 PROCEDURE — G0378 HOSPITAL OBSERVATION PER HR: HCPCS

## 2019-05-14 PROCEDURE — 99220 PR INITIAL OBSERVATION CARE,LEVL III: ICD-10-PCS | Mod: ,,, | Performed by: PHYSICIAN ASSISTANT

## 2019-05-14 PROCEDURE — 93010 ELECTROCARDIOGRAM REPORT: CPT | Mod: ,,, | Performed by: INTERNAL MEDICINE

## 2019-05-14 PROCEDURE — 99285 EMERGENCY DEPT VISIT HI MDM: CPT | Mod: ,,, | Performed by: EMERGENCY MEDICINE

## 2019-05-14 PROCEDURE — 63600175 PHARM REV CODE 636 W HCPCS: Performed by: STUDENT IN AN ORGANIZED HEALTH CARE EDUCATION/TRAINING PROGRAM

## 2019-05-14 PROCEDURE — 80076 HEPATIC FUNCTION PANEL: CPT

## 2019-05-14 PROCEDURE — 84484 ASSAY OF TROPONIN QUANT: CPT | Mod: 91

## 2019-05-14 PROCEDURE — 85610 PROTHROMBIN TIME: CPT

## 2019-05-14 PROCEDURE — 83880 ASSAY OF NATRIURETIC PEPTIDE: CPT

## 2019-05-14 PROCEDURE — 93793 ANTICOAG MGMT PT WARFARIN: CPT | Mod: ,,,

## 2019-05-14 PROCEDURE — 93793 PR ANTICOAGULANT MGMT FOR PT TAKING WARFARIN: ICD-10-PCS | Mod: ,,,

## 2019-05-14 PROCEDURE — 93010 EKG 12-LEAD: ICD-10-PCS | Mod: ,,, | Performed by: INTERNAL MEDICINE

## 2019-05-14 PROCEDURE — 84484 ASSAY OF TROPONIN QUANT: CPT

## 2019-05-14 PROCEDURE — 84145 PROCALCITONIN (PCT): CPT

## 2019-05-14 PROCEDURE — 85025 COMPLETE CBC W/AUTO DIFF WBC: CPT

## 2019-05-14 PROCEDURE — 93005 ELECTROCARDIOGRAM TRACING: CPT

## 2019-05-14 PROCEDURE — 36415 COLL VENOUS BLD VENIPUNCTURE: CPT

## 2019-05-14 PROCEDURE — 99285 PR EMERGENCY DEPT VISIT,LEVEL V: ICD-10-PCS | Mod: ,,, | Performed by: EMERGENCY MEDICINE

## 2019-05-14 PROCEDURE — 99220 PR INITIAL OBSERVATION CARE,LEVL III: CPT | Mod: ,,, | Performed by: PHYSICIAN ASSISTANT

## 2019-05-14 PROCEDURE — 85730 THROMBOPLASTIN TIME PARTIAL: CPT

## 2019-05-14 PROCEDURE — 99285 EMERGENCY DEPT VISIT HI MDM: CPT | Mod: 25

## 2019-05-14 PROCEDURE — 96374 THER/PROPH/DIAG INJ IV PUSH: CPT

## 2019-05-14 RX ORDER — IBUPROFEN 200 MG
16 TABLET ORAL
Status: DISCONTINUED | OUTPATIENT
Start: 2019-05-14 | End: 2019-05-16 | Stop reason: HOSPADM

## 2019-05-14 RX ORDER — IPRATROPIUM BROMIDE AND ALBUTEROL SULFATE 2.5; .5 MG/3ML; MG/3ML
3 SOLUTION RESPIRATORY (INHALATION) EVERY 4 HOURS PRN
Status: DISCONTINUED | OUTPATIENT
Start: 2019-05-14 | End: 2019-05-16 | Stop reason: HOSPADM

## 2019-05-14 RX ORDER — GLUCAGON 1 MG
1 KIT INJECTION
Status: DISCONTINUED | OUTPATIENT
Start: 2019-05-14 | End: 2019-05-16 | Stop reason: HOSPADM

## 2019-05-14 RX ORDER — ONDANSETRON 4 MG/1
4 TABLET, ORALLY DISINTEGRATING ORAL EVERY 8 HOURS PRN
Status: DISCONTINUED | OUTPATIENT
Start: 2019-05-14 | End: 2019-05-16 | Stop reason: HOSPADM

## 2019-05-14 RX ORDER — SODIUM CHLORIDE 0.9 % (FLUSH) 0.9 %
5 SYRINGE (ML) INJECTION
Status: DISCONTINUED | OUTPATIENT
Start: 2019-05-14 | End: 2019-05-16 | Stop reason: HOSPADM

## 2019-05-14 RX ORDER — IBUPROFEN 200 MG
24 TABLET ORAL
Status: DISCONTINUED | OUTPATIENT
Start: 2019-05-14 | End: 2019-05-16 | Stop reason: HOSPADM

## 2019-05-14 RX ORDER — AMOXICILLIN 250 MG
1 CAPSULE ORAL 2 TIMES DAILY PRN
Status: DISCONTINUED | OUTPATIENT
Start: 2019-05-14 | End: 2019-05-15

## 2019-05-14 RX ORDER — FUROSEMIDE 10 MG/ML
80 INJECTION INTRAMUSCULAR; INTRAVENOUS 2 TIMES DAILY
Status: DISCONTINUED | OUTPATIENT
Start: 2019-05-15 | End: 2019-05-15

## 2019-05-14 RX ORDER — FERROUS SULFATE 325(65) MG
325 TABLET, DELAYED RELEASE (ENTERIC COATED) ORAL DAILY
Status: DISCONTINUED | OUTPATIENT
Start: 2019-05-15 | End: 2019-05-16 | Stop reason: HOSPADM

## 2019-05-14 RX ORDER — ASPIRIN 81 MG/1
81 TABLET ORAL DAILY
Status: DISCONTINUED | OUTPATIENT
Start: 2019-05-15 | End: 2019-05-16 | Stop reason: HOSPADM

## 2019-05-14 RX ORDER — RAMELTEON 8 MG/1
8 TABLET ORAL NIGHTLY PRN
Status: DISCONTINUED | OUTPATIENT
Start: 2019-05-14 | End: 2019-05-16 | Stop reason: HOSPADM

## 2019-05-14 RX ORDER — FUROSEMIDE 10 MG/ML
80 INJECTION INTRAMUSCULAR; INTRAVENOUS
Status: COMPLETED | OUTPATIENT
Start: 2019-05-14 | End: 2019-05-14

## 2019-05-14 RX ORDER — NITROGLYCERIN 0.4 MG/1
0.4 TABLET SUBLINGUAL EVERY 5 MIN PRN
Status: DISCONTINUED | OUTPATIENT
Start: 2019-05-14 | End: 2019-05-16 | Stop reason: HOSPADM

## 2019-05-14 RX ADMIN — FUROSEMIDE 80 MG: 10 INJECTION, SOLUTION INTRAMUSCULAR; INTRAVENOUS at 06:05

## 2019-05-14 NOTE — PROGRESS NOTES
This chart was routed to me after Yennifer Pollack MA spoke to patient see her progress note.  I spoke to the patient and he did not go be evaluated by a physician yesterday.  He states he is still bleeding from his gums.  I instructed him again to be evaluated by a physician today, his INR is in range at 2.5 today so he is to take 1 tab daily, and next INR due 5/20/19 per home health which he verbalized understanding.  He states he called Dr Varela's office today to make an appointment today and is waiting on a call back from them.  I advised him again that he has to be with an [facility] PCP to be monitored by this clinic which he verbalized understanding.  I faxed order to  for INR on 5/20/19.

## 2019-05-14 NOTE — PROGRESS NOTES
First he not going back to see the doctor about his heart. He had fluid problem. He told the home health nurse about the bleeding he told him not to take his Coumadin today. And 2.5 INR is tool high for him. So he dohn't want to take his coumadin.

## 2019-05-14 NOTE — ED NOTES
Pt states he does not want to walk to the restroom with the cardiac monitor and states he will take it off.

## 2019-05-14 NOTE — ED PROVIDER NOTES
"Encounter Date: 5/14/2019    SCRIBE #1 NOTE: I, Cookie Dale, am scribing for, and in the presence of,  Dr. Horne. I have scribed the following portions of the note - the EKG reading.       History     Chief Complaint   Patient presents with    Coagulation Disorder     gums keep bleeding, on coumadin     The history is provided by the patient.      52 y/o M PMhx combined systolic and diastolic HF (4/2019 EF 23%, RV systolic dysfunction, mod MR, TR), HTN, Afib on warfarin, CVA, CAD who presents w/ bleeding from his gums. Per patient bleeding from lower gums since Sunday. He states this continued until this AM when it self resolved. Additionally patient complains chest pain present since last Thursday. He describes pain as "being punched in the chest" as well as pressure, substernal, non-radiating, not relieved by nitro, sometimes relieved by rest, constant in intensity.  He denies fever, chills, abd pain, n/v, melena, hematochezia, hemoptysis, hematemesis.    Of note patient has been taking lasix 40mg bid instead of the prescribed 80mg bid on prior discharge. He also admits to not following his fluid restriction closely.    Review of patient's allergies indicates:   Allergen Reactions    Acetaminophen      Itching    Oxycodone-acetaminophen      Other reaction(s): Itching    Ace inhibitors Other (See Comments)     cough     Past Medical History:   Diagnosis Date    Anticoagulant long-term use     Cardiomegaly     CHF (congestive heart failure)     Chronic combined systolic and diastolic congestive heart failure     Coronary artery disease     CVA (cerebrovascular accident)     Heart attack 2006    Hypertension     Hyperthyroidism, subclinical 1/2/2013    MI (myocardial infarction) 9/22/2013    MI in 2009      Paroxysmal atrial fibrillation     PE (pulmonary embolism) 1/1/2013    IN 2010     S/P ablation of atrial flutter 2008    Stroke 2009    no residual weaknesses     Past Surgical " History:   Procedure Laterality Date    RADIOFREQUENCY ABLATION  01/08/2008    for atrial flutter     Family History   Problem Relation Age of Onset    Hypertension Mother     Stroke Mother     Hypertension Father     Alcohol abuse Father     Hypertension Sister     Hypertension Brother      Social History     Tobacco Use    Smoking status: Never Smoker    Smokeless tobacco: Never Used   Substance Use Topics    Alcohol use: No    Drug use: No     Review of Systems   Constitutional: Positive for fatigue. Negative for chills and fever.   HENT: Negative for sore throat and tinnitus.    Eyes: Negative for photophobia.   Respiratory: Positive for shortness of breath. Negative for cough and wheezing.    Cardiovascular: Positive for chest pain and leg swelling.   Gastrointestinal: Negative for abdominal pain, anal bleeding, blood in stool, constipation, diarrhea, nausea and vomiting.   Genitourinary: Negative for dysuria.   Musculoskeletal: Negative for arthralgias and myalgias.   Skin: Negative for rash.   Neurological: Negative for headaches.   Psychiatric/Behavioral: Negative for agitation and confusion.       Physical Exam     Initial Vitals [05/14/19 1651]   BP Pulse Resp Temp SpO2   126/70 80 18 98.7 °F (37.1 °C) 95 %      MAP       --         Physical Exam    Constitutional: He is not diaphoretic. No distress.   HENT:   Head: Normocephalic and atraumatic.   Eyes: Conjunctivae and EOM are normal. No scleral icterus.   Neck: Neck supple. JVD (at angle of mandible) present.   Cardiovascular: Normal rate and intact distal pulses.   Irregularly irregular   Pulmonary/Chest: No respiratory distress. He has no wheezes. He has no rhonchi. He has rales (bibasilar).   Decreased breath sounds at bases b/l   Abdominal: Soft. He exhibits no distension. There is no tenderness. There is no rebound and no guarding.   Musculoskeletal: He exhibits edema (+2-3 b/l LE edema; edema from thighs down).   Lymphadenopathy:     He  has no cervical adenopathy.   Neurological: He is alert and oriented to person, place, and time.   Skin: No rash noted. No erythema.         ED Course   Procedures  Labs Reviewed   CBC W/ AUTO DIFFERENTIAL - Abnormal; Notable for the following components:       Result Value    RBC 3.00 (*)     Hemoglobin 7.9 (*)     Hematocrit 25.6 (*)     Mean Corpuscular Hemoglobin 26.3 (*)     Mean Corpuscular Hemoglobin Conc 30.9 (*)     RDW 15.8 (*)     Mono% 18.1 (*)     All other components within normal limits   TROPONIN I - Abnormal; Notable for the following components:    Troponin I 0.041 (*)     All other components within normal limits   B-TYPE NATRIURETIC PEPTIDE - Abnormal; Notable for the following components:     (*)     All other components within normal limits   PROTIME-INR - Abnormal; Notable for the following components:    Prothrombin Time 21.8 (*)     INR 2.2 (*)     All other components within normal limits   APTT - Abnormal; Notable for the following components:    aPTT 35.7 (*)     All other components within normal limits   BASIC METABOLIC PANEL - Abnormal; Notable for the following components:    BUN, Bld 21 (*)     eGFR if non  57.4 (*)     All other components within normal limits   HEPATIC FUNCTION PANEL - Abnormal; Notable for the following components:    Total Bilirubin 1.2 (*)     Bilirubin, Direct 0.7 (*)     ALT 8 (*)     Alkaline Phosphatase 191 (*)     All other components within normal limits     EKG Readings: (Independently Interpreted)   Rhythm: Atrial Fibrillation. Heart Rate: 80. ST Segments: Normal ST Segments. Axis: Left Axis Deviation.   t wave inversions in leads 2, AVL, v3. Low voltage QRS       Imaging Results          X-Ray Chest PA And Lateral (Final result)  Result time 05/14/19 18:24:13    Final result by Selvin Sosa MD (05/14/19 18:24:13)                 Impression:      1. Somewhat improved aeration as compared to the previous examination, this may  reflect improving pulmonary edema in this patient with cardiomegaly.  Developing left consolidation however is not excluded.  There remains right pleural fluid.      Electronically signed by: Selvin Sosa MD  Date:    05/14/2019  Time:    18:24             Narrative:    EXAMINATION:  XR CHEST PA AND LATERAL    CLINICAL HISTORY:  Shortness of breath    TECHNIQUE:  PA and lateral views of the chest were performed.    COMPARISON:  04/19/2019    FINDINGS:  The cardiomediastinal silhouette is enlarged, similar to the previous exam.  There obscuration of the costophrenic angles, right greater than left suggesting pleural effusion.  The trachea is midline.  The lungs are symmetrically expanded bilaterally with patchy increased interstitial and parenchymal attenuation primarily projected over the right mid and lower lung zones, somewhat improved as compared to the previous examination.  There is patchy parenchymal attenuation projected over the left lower lung zone..  There is no pneumothorax.  The osseous structures are remarkable for degenerative change..                                 Medical Decision Making:   History:   Old Medical Records: I decided to obtain old medical records.  Initial Assessment:   52 y/o M PMhx combined systolic and diastolic HF (4/2019 EF 23%, RV systolic dysfunction, mod MR, TR), HTN, Afib on warfarin, CVA, CAD who presents w/ bleeding from his gums as well as SOB & chest pain.  Bleeding gums self-resolved this AM  Differential includes but is not limited to MI (less likely, troponin .041 which is less than patient's baseline troponin leak & pt w/ borderline CKD stage III), acute decompensated heart failure, PNA (less likely given no infectious symptoms)  Obtained coagulation (INR therapeutic at 2.5)  No bleeding from gums on exam, CBC significant for anemia (baseline Hb this year 8-9, patient on ferrous sulfate)  BNP increased from prior, patient w/ elevated JVP & +2-3 b/l LE edema  consistent w/ decompensated heart failure  Suspect 2/2 patient not taking appropriate dose of lasix (40mg bid instead of the prescribed 80mg bid)  Patient not far from baseline weight from chart review of 250-260  Will place in observation to medicine  Independently Interpreted Test(s):   I have ordered and independently interpreted EKG Reading(s) - see prior notes  Clinical Tests:   Lab Tests: Ordered and Reviewed  Radiological Study: Ordered and Reviewed  Medical Tests: Ordered and Reviewed            Scribe Attestation:   Scribe #1: I performed the above scribed service and the documentation accurately describes the services I performed. I attest to the accuracy of the note.    Attending Attestation:   Physician Attestation Statement for Resident:  As the supervising MD   Physician Attestation Statement: I have personally seen and examined this patient.   I agree with the above history. -: 52 yo M with pmhx CHF (EF 23%), CAD, Afib on coumadin presents with bleeding gums, shortness of breath, and chest pain. Bleeding gums resolved today.  Shortness of breath has been constant but worsening for several days.  Several days of worsening substernal chest pain.   As the supervising MD I agree with the above PE.   -: Nontoxic on room air.  +JVD.  Irregularly regular rhythm   As the supervising MD I agree with the above treatment, course, plan, and disposition.   -: Symptoms overall consistent with acute CHF exacerbation.  Patient has been taking his Lasix as 40 b.i.d. as per his prescription bottle and not 80 b.i.d. as in chart.  From a respiratory standpoint, he is in no significant distress. However, patient became upset when we tried to manage his heart failure as an outpatient.  Diuresed in the ED.  Admit per Case Management but per hospitalist, Dr. López, will be placed in obs.  Mild troponin elevation appears to be secondary to acute CHF exacerbation/supply demand mismatch and not acute thrombotic ACS.           Physician Attestation for Scribe:      Comments: I, Dr. Rubio Horne, personally performed the services described in this documentation. All medical record entries made by the scribe were at my direction and in my presence.  I have reviewed the chart and agree that the record reflects my personal performance and is accurate and complete. Rubio Horne MD.  8:23 PM 05/14/2019                 Clinical Impression:       ICD-10-CM ICD-9-CM   1. Acute on chronic congestive heart failure, unspecified heart failure type I50.9 428.0   2. Shortness of breath R06.02 786.05   3. Troponin level elevated R74.8 790.6                                Rubio Horne MD  05/14/19 2023

## 2019-05-14 NOTE — ED TRIAGE NOTES
Pt presents to the ED with c/o his gums bleeding. Pt takes coumadin for a-fib. Pt's gums are not currently bleeding.

## 2019-05-14 NOTE — ED NOTES
Pt's first and last name and birthday confirmed.   LOC: The patient is awake, alert and aware of environment with an angry affect, the patient is oriented x 3 and speaking appropriately.   APPEARANCE: Patient resting comfortably and in no acute distress, patient is clean and well groomed.  SKIN: The skin is warm and dry, patient has normal skin turgor and moist mucus membranes, skin intact, no breakdown or brusing noted. Lower extremity skin is hard and scaly.   MUSKULOSKELETAL: Patient moving all extremities well, no obvious swelling or deformities noted.  RESPIRATORY: Airway is open and patent, respirations are spontaneous, patient has a normal effort and rate. Breath sounds are clear and equal bilaterally.  CARDIAC: Normal heart sounds. Bilateral lower extremity peripheral pitting edema.  ABDOMEN: Soft and non tender to palpation, no distention noted. Bowel sounds present.   NEURO: No neuro deficits, hand grasp equal, no drift noted, no facial droop noted. Speech is clear.

## 2019-05-14 NOTE — PROGRESS NOTES
INR of 2.5 is perfect. His range in 2.0-3.0. The bleeding is from his gums and is not a significant bleed. If it is a significant bleed, then he needs to go to the ER. He needs to take his coumadin or he can have a stroke which is far worse than gum bleeding. He needs to establish with a primary care doctor at [facility] or we will not be able to manage his coumadin going forward.

## 2019-05-15 PROBLEM — I83.009 VENOUS STASIS ULCER: Status: ACTIVE | Noted: 2019-05-15

## 2019-05-15 PROBLEM — L97.909 VENOUS STASIS ULCER: Status: ACTIVE | Noted: 2019-05-15

## 2019-05-15 PROBLEM — K21.9 GERD (GASTROESOPHAGEAL REFLUX DISEASE): Status: ACTIVE | Noted: 2019-05-15

## 2019-05-15 PROBLEM — I87.8 VENOUS STASIS OF LOWER EXTREMITY: Status: ACTIVE | Noted: 2019-05-15

## 2019-05-15 LAB
ANION GAP SERPL CALC-SCNC: 11 MMOL/L (ref 8–16)
ANION GAP SERPL CALC-SCNC: 11 MMOL/L (ref 8–16)
APTT BLDCRRT: 34.4 SEC (ref 21–32)
BASOPHILS # BLD AUTO: 0.06 K/UL (ref 0–0.2)
BASOPHILS NFR BLD: 0.9 % (ref 0–1.9)
BUN SERPL-MCNC: 21 MG/DL (ref 6–20)
BUN SERPL-MCNC: 22 MG/DL (ref 6–20)
CALCIUM SERPL-MCNC: 10 MG/DL (ref 8.7–10.5)
CALCIUM SERPL-MCNC: 9.8 MG/DL (ref 8.7–10.5)
CHLORIDE SERPL-SCNC: 103 MMOL/L (ref 95–110)
CHLORIDE SERPL-SCNC: 104 MMOL/L (ref 95–110)
CO2 SERPL-SCNC: 27 MMOL/L (ref 23–29)
CO2 SERPL-SCNC: 27 MMOL/L (ref 23–29)
CREAT SERPL-MCNC: 1.3 MG/DL (ref 0.5–1.4)
CREAT SERPL-MCNC: 1.4 MG/DL (ref 0.5–1.4)
DIFFERENTIAL METHOD: ABNORMAL
EOSINOPHIL # BLD AUTO: 0.5 K/UL (ref 0–0.5)
EOSINOPHIL NFR BLD: 7 % (ref 0–8)
ERYTHROCYTE [DISTWIDTH] IN BLOOD BY AUTOMATED COUNT: 15.6 % (ref 11.5–14.5)
EST. GFR  (AFRICAN AMERICAN): >60 ML/MIN/1.73 M^2
EST. GFR  (AFRICAN AMERICAN): >60 ML/MIN/1.73 M^2
EST. GFR  (NON AFRICAN AMERICAN): 57.4 ML/MIN/1.73 M^2
EST. GFR  (NON AFRICAN AMERICAN): >60 ML/MIN/1.73 M^2
GLUCOSE SERPL-MCNC: 126 MG/DL (ref 70–110)
GLUCOSE SERPL-MCNC: 126 MG/DL (ref 70–110)
HCT VFR BLD AUTO: 27.1 % (ref 40–54)
HGB BLD-MCNC: 8.2 G/DL (ref 14–18)
IMM GRANULOCYTES # BLD AUTO: 0.01 K/UL (ref 0–0.04)
IMM GRANULOCYTES NFR BLD AUTO: 0.1 % (ref 0–0.5)
INR PPP: 2 (ref 0.8–1.2)
LYMPHOCYTES # BLD AUTO: 1.2 K/UL (ref 1–4.8)
LYMPHOCYTES NFR BLD: 17.2 % (ref 18–48)
MAGNESIUM SERPL-MCNC: 1.8 MG/DL (ref 1.6–2.6)
MCH RBC QN AUTO: 25.8 PG (ref 27–31)
MCHC RBC AUTO-ENTMCNC: 30.3 G/DL (ref 32–36)
MCV RBC AUTO: 85 FL (ref 82–98)
MONOCYTES # BLD AUTO: 1.3 K/UL (ref 0.3–1)
MONOCYTES NFR BLD: 20 % (ref 4–15)
NEUTROPHILS # BLD AUTO: 3.7 K/UL (ref 1.8–7.7)
NEUTROPHILS NFR BLD: 54.8 % (ref 38–73)
NRBC BLD-RTO: 0 /100 WBC
PHOSPHATE SERPL-MCNC: 2.7 MG/DL (ref 2.7–4.5)
PLATELET # BLD AUTO: 210 K/UL (ref 150–350)
PMV BLD AUTO: 9.9 FL (ref 9.2–12.9)
POTASSIUM SERPL-SCNC: 3.9 MMOL/L (ref 3.5–5.1)
POTASSIUM SERPL-SCNC: 4 MMOL/L (ref 3.5–5.1)
PROTHROMBIN TIME: 19.1 SEC (ref 9–12.5)
RBC # BLD AUTO: 3.18 M/UL (ref 4.6–6.2)
SODIUM SERPL-SCNC: 141 MMOL/L (ref 136–145)
SODIUM SERPL-SCNC: 142 MMOL/L (ref 136–145)
TROPONIN I SERPL DL<=0.01 NG/ML-MCNC: 0.05 NG/ML (ref 0–0.03)
TROPONIN I SERPL DL<=0.01 NG/ML-MCNC: 0.06 NG/ML (ref 0–0.03)
WBC # BLD AUTO: 6.7 K/UL (ref 3.9–12.7)

## 2019-05-15 PROCEDURE — 80048 BASIC METABOLIC PNL TOTAL CA: CPT | Mod: 91

## 2019-05-15 PROCEDURE — 85730 THROMBOPLASTIN TIME PARTIAL: CPT

## 2019-05-15 PROCEDURE — 99226 PR SUBSEQUENT OBSERVATION CARE,LEVEL III: ICD-10-PCS | Mod: ,,, | Performed by: PHYSICIAN ASSISTANT

## 2019-05-15 PROCEDURE — 85025 COMPLETE CBC W/AUTO DIFF WBC: CPT

## 2019-05-15 PROCEDURE — 84100 ASSAY OF PHOSPHORUS: CPT

## 2019-05-15 PROCEDURE — G0378 HOSPITAL OBSERVATION PER HR: HCPCS

## 2019-05-15 PROCEDURE — 36415 COLL VENOUS BLD VENIPUNCTURE: CPT

## 2019-05-15 PROCEDURE — 83735 ASSAY OF MAGNESIUM: CPT

## 2019-05-15 PROCEDURE — 85610 PROTHROMBIN TIME: CPT

## 2019-05-15 PROCEDURE — S0028 INJECTION, FAMOTIDINE, 20 MG: HCPCS | Performed by: PHYSICIAN ASSISTANT

## 2019-05-15 PROCEDURE — 84484 ASSAY OF TROPONIN QUANT: CPT

## 2019-05-15 PROCEDURE — 63600175 PHARM REV CODE 636 W HCPCS: Performed by: PHYSICIAN ASSISTANT

## 2019-05-15 PROCEDURE — 25000003 PHARM REV CODE 250: Performed by: PHYSICIAN ASSISTANT

## 2019-05-15 PROCEDURE — 80048 BASIC METABOLIC PNL TOTAL CA: CPT

## 2019-05-15 PROCEDURE — 99226 PR SUBSEQUENT OBSERVATION CARE,LEVEL III: CPT | Mod: ,,, | Performed by: PHYSICIAN ASSISTANT

## 2019-05-15 RX ORDER — LOSARTAN POTASSIUM 25 MG/1
25 TABLET ORAL DAILY
Status: DISCONTINUED | OUTPATIENT
Start: 2019-05-15 | End: 2019-05-16

## 2019-05-15 RX ORDER — CALCIUM CARBONATE 200(500)MG
500 TABLET,CHEWABLE ORAL DAILY PRN
Status: DISCONTINUED | OUTPATIENT
Start: 2019-05-15 | End: 2019-05-15

## 2019-05-15 RX ORDER — METOPROLOL SUCCINATE 25 MG/1
25 TABLET, EXTENDED RELEASE ORAL DAILY
Status: DISCONTINUED | OUTPATIENT
Start: 2019-05-15 | End: 2019-05-16 | Stop reason: HOSPADM

## 2019-05-15 RX ORDER — PANTOPRAZOLE SODIUM 40 MG/1
40 TABLET, DELAYED RELEASE ORAL DAILY
Status: DISCONTINUED | OUTPATIENT
Start: 2019-05-15 | End: 2019-05-16 | Stop reason: HOSPADM

## 2019-05-15 RX ORDER — FAMOTIDINE 10 MG/ML
20 INJECTION INTRAVENOUS ONCE
Status: COMPLETED | OUTPATIENT
Start: 2019-05-15 | End: 2019-05-15

## 2019-05-15 RX ORDER — FUROSEMIDE 40 MG/1
80 TABLET ORAL 2 TIMES DAILY
Status: DISCONTINUED | OUTPATIENT
Start: 2019-05-15 | End: 2019-05-16

## 2019-05-15 RX ORDER — CALCIUM CARBONATE 200(500)MG
500 TABLET,CHEWABLE ORAL 3 TIMES DAILY PRN
Status: DISCONTINUED | OUTPATIENT
Start: 2019-05-15 | End: 2019-05-16 | Stop reason: HOSPADM

## 2019-05-15 RX ORDER — ALUMINUM HYDROXIDE, MAGNESIUM HYDROXIDE, AND SIMETHICONE 2400; 240; 2400 MG/30ML; MG/30ML; MG/30ML
30 SUSPENSION ORAL EVERY 4 HOURS
Status: DISCONTINUED | OUTPATIENT
Start: 2019-05-15 | End: 2019-05-16 | Stop reason: HOSPADM

## 2019-05-15 RX ORDER — OXYCODONE HYDROCHLORIDE 5 MG/1
5 TABLET ORAL ONCE
Status: COMPLETED | OUTPATIENT
Start: 2019-05-15 | End: 2019-05-15

## 2019-05-15 RX ORDER — OXYCODONE HYDROCHLORIDE 10 MG/1
10 TABLET ORAL ONCE
Status: COMPLETED | OUTPATIENT
Start: 2019-05-15 | End: 2019-05-15

## 2019-05-15 RX ADMIN — FUROSEMIDE 80 MG: 40 TABLET ORAL at 06:05

## 2019-05-15 RX ADMIN — ASPIRIN 81 MG: 81 TABLET, COATED ORAL at 09:05

## 2019-05-15 RX ADMIN — FERROUS SULFATE TAB EC 325 MG (65 MG FE EQUIVALENT) 325 MG: 325 (65 FE) TABLET DELAYED RESPONSE at 09:05

## 2019-05-15 RX ADMIN — WARFARIN SODIUM 6 MG: 5 TABLET ORAL at 04:05

## 2019-05-15 RX ADMIN — ALUMINUM HYDROXIDE, MAGNESIUM HYDROXIDE, AND SIMETHICONE: 200; 200; 20 SUSPENSION ORAL at 04:05

## 2019-05-15 RX ADMIN — ALUMINUM HYDROXIDE, MAGNESIUM HYDROXIDE, AND DIMETHICONE 30 ML: 400; 400; 40 SUSPENSION ORAL at 10:05

## 2019-05-15 RX ADMIN — ALUMINUM HYDROXIDE, MAGNESIUM HYDROXIDE, AND DIMETHICONE 30 ML: 400; 400; 40 SUSPENSION ORAL at 03:05

## 2019-05-15 RX ADMIN — ALUMINUM HYDROXIDE, MAGNESIUM HYDROXIDE, AND DIMETHICONE 30 ML: 400; 400; 40 SUSPENSION ORAL at 06:05

## 2019-05-15 RX ADMIN — LOSARTAN POTASSIUM 25 MG: 25 TABLET, FILM COATED ORAL at 09:05

## 2019-05-15 RX ADMIN — OXYCODONE HYDROCHLORIDE 5 MG: 5 TABLET ORAL at 04:05

## 2019-05-15 RX ADMIN — OXYCODONE HYDROCHLORIDE 10 MG: 10 TABLET ORAL at 10:05

## 2019-05-15 RX ADMIN — METOPROLOL SUCCINATE 25 MG: 25 TABLET, EXTENDED RELEASE ORAL at 04:05

## 2019-05-15 RX ADMIN — PANTOPRAZOLE SODIUM 40 MG: 40 TABLET, DELAYED RELEASE ORAL at 09:05

## 2019-05-15 RX ADMIN — FUROSEMIDE 80 MG: 10 INJECTION, SOLUTION INTRAMUSCULAR; INTRAVENOUS at 09:05

## 2019-05-15 RX ADMIN — FAMOTIDINE 20 MG: 10 INJECTION, SOLUTION INTRAVENOUS at 06:05

## 2019-05-15 NOTE — ASSESSMENT & PLAN NOTE
"Chronic anticoagulation  Not on BB, AC with warfarin. Hx CVA and PE.  - c/w warfarin 6 mg PO daily (INR 2.2 on admission)  - daily INR  - has recently been compliant with warfarin clinic. Per previous provider "Extensive discussion with patient on switching to DOAC, not interested in this time.  Refusing to follow [place] [facility] [place]. Only wants to go to his PCP Dr. Swift, but patient had not been since October. He is a poor candidate for warfarin, but refusing to switch to DOAC."  - CHADSVASC 3  - monitor on tele  "

## 2019-05-15 NOTE — HOSPITAL COURSE
Mr. Terrell was admitted to observation for CHF exacerbation. Started on IV Lasix 80 mg--> 80 PO BID. Complaining of burning chest pain, made worse by GI cocktail, mylanta. However, overnight GI cocktail gave mild improvement in pain. Requesting narcotic medication. Drug seeking behavior. Started on daily Protonix, Troponin elevated but lower than previous. Repeat troponin trended up, but in setting of runs of vtach. Low concern for ACS. Patient not interested in life vest. Discussed extensively with patient switching from warfarin to apixaban, but patient not interested. Patient threatening to return to ED if discharged. Cr jumped from 1.4-->1.8 as expected in setting of increased ARB. Held lasix, ARB today and decreased ARB dose at discharge. Patient physically threatened provider. Repeat labs ordered for 1 week to be sent to patients PCP. Security and attending accompanied provider for discharge. Patient discharged on home dose of lasix, losartan, and started on protonix daily and metoprolol.  Patient without questions, verbalized understanding. Patient to follow up with PCP in 1 week.

## 2019-05-15 NOTE — PROGRESS NOTES
"Consult received per PA-venous stasis/BLE edema with multiple blisters through out legs. Chart reflect pt was admitted on 5/14/19 with acute on chronic systolic congestive and diastolic congestive heart failure with a coagulation disorder (gums bleeding on coumadin). Chart reflects pt has a PMHx of combined systolic and diastolic HF (4/2019 EF 23%, RV systolic dysfunction, mod MR, TR), HTN, Afib on warfarin, CVA, CAD.    Received pt sitting up in chair awake and alert with legs dependent. PA at bedside assessing pt as well.    Upon assessment of bilateral lower legs: pitrting edema with dry skin and multiple scattered bulla. Wound noted to right calf with red and pink granular tissue with small amount of serous drainage noted. Pt states the wound is the result of a blister popping and him scratching.     Cleansed wound with sterile normal saline and applied Hydrofera Blue with a border foam to cover. Applied sween moisturizer to bilateral lower legs.     (see assessment below)    It was discussed with pt to refrain from foods high in sodium and to elevate legs to help reduce edema. Pt stated that he has worn compression in the past but it "tore up his legs." Also of note, per Robb Claros's, PA, progress note on 5/14/19, pt reports that he has only been taking 40 mg PO BID of lasix instead of 80 mg BID.  He has been urinating "constantly" at home.      Assisted pt with elevating legs; however, pt seemed hesitant to do so stating that it hurts him too much. Pt may benefit from having ABIs and vascular studies done. Spoke with pt's nurse, Yaneth, who requested Tubi  to go on the pt's legs.     Recommendations:  -Nursing to cleanse right calf wound with sterile normal saline and apply hydrofera blue ready to wound bed with a border foam to cover. Nursing staff to place hydrofera blue ready shiny side up when placing it on the wound bed.Hydrofera Blue Ready provides a barrier to moisture and antibacterial " protection to promote wound healing.   -Nursing to apply sween cream (pink top) daily to bilateral lower legs to help sooth dry skin. Will need to let lotion absorb into skin prior to the application of Tubi  G to bilateral lower legs.  -Nursing to apply Tubi  G to bilateral lower legs from toe to knee. This will provide minimal compression; however, in the event if pt should experience increase shortness of breath or pain while wearing Tubi  nursing are to remove them immediately.   -Pt would benefit from vascular studies (ABIs, venous doppler studies) in order to determine if pt has any underlying peripheral vascular disease issues and to determine if he could have a higher gradient of compression applied to his legs.    Discussed plan of care with pt, pt's nurse, and with Hazel Noriega PA-C.    Wound care will continue to follow pt PRN. [id number]       05/15/19 1111        Wound 05/15/19 Venous Ulcer lateral Calf   Date First Assessed: 05/15/19   Pre-existing: Yes  Primary Wound Type: Venous Ulcer  Side: Right  Orientation: lateral  Location: Calf   Wound Image    Wound WDL ex   Dressing Appearance Open to air;No dressing   Drainage Amount Small   Drainage Characteristics/Odor Serous   Appearance Pink;Epithelialization   Tissue loss description Partial thickness   Periwound Area Blistered;Dry;Edematous   Wound Edges Open   Wound Length (cm) 1 cm   Wound Width (cm) 1 cm   Wound Depth (cm) 0.1 cm   Wound Volume (cm^3) 0.1 cm^3   Wound Surface Area (cm^2) 1 cm^2   Care Cleansed with:;Sterile normal saline;Applied:;Skin Barrier;Moisturizing agent   Dressing Applied;Methylene blue/gentian violet;Foam   Dressing Change Due 05/17/19

## 2019-05-15 NOTE — PLAN OF CARE
Problem: Adult Inpatient Plan of Care  Goal: Plan of Care Review  Outcome: Ongoing (interventions implemented as appropriate)  Pt remains free of falls and injury. Pt does not want to take off clothes and put on gown. Pt stated the GI coctail caused him more pain. Pt states the IV pepcid cased his whole body to burn and is requesting pain medications ,a the same time pt states room is cold and needs a blanket. Bed low and locked, Call light within reach.

## 2019-05-15 NOTE — H&P
"[facility] Hospital Medicine  History & Physical    Patient Name: Hamlet Terrell  MRN: [id number]  Admission Date: 5/14/2019  Attending Physician: Joselyn Isabel MD   Primary Care Provider: Carlin Swift MD    [facility] Medicine Team: [facility] Robb Claros PA-C     Patient information was obtained from patient, past medical records and ER records.     Subjective:     Principal Problem:Acute on chronic combined systolic (congestive) and diastolic (congestive) heart failure    Chief Complaint:   Chief Complaint   Patient presents with    Coagulation Disorder     gums keep bleeding, on coumadin        HPI: Hamlet Terrell is a 53 y.o. with pmhx combined systolic and diastolic HF (4/2019 EF 23%, RV systolic dysfunction, mod MR, TR), HTN, Afib on warfarin, CVA, CAD who presented to ED c/o bleeding from his gums. Per patient bleeding from lower gums since Sunday. He states this continued until this AM when it self resolved. He reports hx of epistaxis as well.  He states that he has been compliant with his coumadin. During ED evaluation he complained of chest pain.  He reports new onset non-radiating, pleuritic substernal chest "burning".  Pain has been constant since 10:30 am, not relieved with nitro, and sometimes relieved with rest.  Pain worse with urination. Patient reports chronic orthopnea, MUÑOZ which is not worse than baseline.  He reports no wt changes, but states that he was 287 at discharge and is 267 today. He also states that he has had worsening BLE edema with "blisters" and difficulty keeping skin intact, especially during showers. He denies f/c, cough, wheezing, N/V, headaches, dizziness, vision changes.  He reports that he has only been taking 40 mg PO BID of lasix instead of 80 mg BID.  He has been urinating "constantly" at home.      Of note, recently admitted 4/15-4/23 for acute CHF exacerbation. TTE showed EF 23%, diastolic dysfunction, severe KASEY, pulmonary htn, septal " "WMA, moderate mitral/tricuspid regurg, and elevated CVP.  Cardiology consulted for evaluation of life vest vs. AICD for patient given runs of vtach and poor EF. Patient refused AICD/ life vest.  Patient developed epistaxis which resolved with holding warfarin (INR was supratherapeutic). Patient educated on switching from coumadin to DOAC; patient refused.    ED: VSS, no leukocytosis.  EKG non-ischemic, shows afib.  Trop .041 (lower than 4 weeks ago) in setting of CKD. Procal .11. CXR shows "Somewhat improved aeration as compared to the previous examination, this may reflect improving pulmonary edema in this patient with cardiomegaly. Developing left consolidation however is not excluded. There remains right pleural fluid."  Given lasix 80 mg IVP.     Past Medical History:   Diagnosis Date    Anticoagulant long-term use     Cardiomegaly     CHF (congestive heart failure)     Chronic combined systolic and diastolic congestive heart failure     Coronary artery disease     CVA (cerebrovascular accident)     Heart attack 2006    Hypertension     Hyperthyroidism, subclinical 1/2/2013    MI (myocardial infarction) 9/22/2013    MI in 2009      Paroxysmal atrial fibrillation     PE (pulmonary embolism) 1/1/2013    IN 2010     S/P ablation of atrial flutter 2008    Stroke 2009    no residual weaknesses       Past Surgical History:   Procedure Laterality Date    RADIOFREQUENCY ABLATION  01/08/2008    for atrial flutter       Review of patient's allergies indicates:   Allergen Reactions    Acetaminophen      Itching    Oxycodone-acetaminophen      Other reaction(s): Itching    Ace inhibitors Other (See Comments)     cough       No current facility-administered medications on file prior to encounter.      Current Outpatient Medications on File Prior to Encounter   Medication Sig    albuterol (PROVENTIL/VENTOLIN HFA) 90 mcg/actuation inhaler Inhale 1-2 puffs into the lungs every 6 (six) hours as needed for " Wheezing. Rescue    aspirin (ECOTRIN) 81 MG EC tablet Take 81 mg by mouth once daily.    ferrous sulfate 325 (65 FE) MG EC tablet Take 1 tablet (325 mg total) by mouth once daily.    furosemide (LASIX) 40 MG tablet Take 80 mg by mouth 2 (two) times daily.    losartan (COZAAR) 25 MG tablet Take 0.5 tablets (12.5 mg total) by mouth once daily.    nitroGLYCERIN (NITROSTAT) 0.4 MG SL tablet Place 1 tablet (0.4 mg total) under the tongue every 5 (five) minutes as needed for Chest pain. Tablet, Sublingual Sublingual    warfarin (COUMADIN) 6 MG tablet Take 1 tablet (6 mg total) by mouth Daily.     Family History     Problem Relation (Age of Onset)    Alcohol abuse Father    Hypertension Mother, Father, Sister, Brother    Stroke Mother        Tobacco Use    Smoking status: Never Smoker    Smokeless tobacco: Never Used   Substance and Sexual Activity    Alcohol use: No    Drug use: No    Sexual activity: Never     Review of Systems   Constitutional: Positive for fatigue (with ambulation; not with biking.). Negative for chills, fever and unexpected weight change.   HENT: Negative for ear pain and sore throat.    Eyes: Negative for pain and visual disturbance.   Respiratory: Positive for shortness of breath. Negative for cough.         +orthopnea   Cardiovascular: Positive for chest pain and leg swelling. Negative for palpitations.   Gastrointestinal: Negative for abdominal pain, diarrhea, nausea and vomiting.   Endocrine: Negative for heat intolerance and polydipsia.   Genitourinary: Negative for dysuria, frequency and urgency.   Musculoskeletal: Negative for back pain and gait problem.   Skin: Negative for rash and wound.   Neurological: Negative for dizziness and weakness.   Psychiatric/Behavioral: Negative for confusion. The patient is not nervous/anxious.      Objective:     Vital Signs (Most Recent):  Temp: 97.5 °F (36.4 °C) (05/14/19 2116)  Pulse: 80 (05/14/19 2300)  Resp: 16 (05/14/19 2116)  BP: 138/70  (05/14/19 2116)  SpO2: 97 % (05/14/19 2116) Vital Signs (24h Range):  Temp:  [97.5 °F (36.4 °C)-98.7 °F (37.1 °C)] 97.5 °F (36.4 °C)  Pulse:  [78-80] 80  Resp:  [16-18] 16  SpO2:  [95 %-97 %] 97 %  BP: (126-138)/(70) 138/70     Weight: 121.4 kg (267 lb 10.2 oz)  Body mass index is 39.52 kg/m².    Physical Exam   Constitutional: He is oriented to person, place, and time. He appears well-developed and well-nourished. No distress.   HENT:   Head: Normocephalic and atraumatic.   Eyes: Pupils are equal, round, and reactive to light. Conjunctivae and EOM are normal.   Neck: Normal range of motion. Neck supple.   Cardiovascular: Normal rate and normal heart sounds. An irregularly irregular rhythm present. Exam reveals decreased pulses (DP bilaterally).   Pulmonary/Chest: Effort normal. He has decreased breath sounds in the right middle field, the right lower field, the left middle field and the left lower field. He has no wheezes. He has no rhonchi. He has no rales.   Abdominal: Soft. Bowel sounds are normal. He exhibits no distension. There is no tenderness.   Musculoskeletal: Normal range of motion. He exhibits edema and tenderness.   BLE pitting edema from feet, superior to knees with TTP and induration up to knees  Sensation intact to feet/toes and weak pulses present   Neurological: He is alert and oriented to person, place, and time.   Skin: Skin is warm and dry. He is not diaphoretic.   BLE woody appearance with multiple small bulla and dry skin   Psychiatric: He has a normal mood and affect. His behavior is normal. Judgment and thought content normal.   Nursing note and vitals reviewed.            CRANIAL NERVES     CN III, IV, VI   Pupils are equal, round, and reactive to light.  Extraocular motions are normal.        Significant Labs:   CBC:   Recent Labs   Lab 05/14/19  1802   WBC 5.54   HGB 7.9*   HCT 25.6*        CMP:   Recent Labs   Lab 05/14/19  1802      K 4.3      CO2 26   GLU 86   BUN  21*   CREATININE 1.4   CALCIUM 9.6   PROT 8.4   ALBUMIN 3.6   BILITOT 1.2*   ALKPHOS 191*   AST 16   ALT 8*   ANIONGAP 10   EGFRNONAA 57.4*     Troponin:   Recent Labs   Lab 05/14/19  1802   TROPONINI 0.041*       Significant Imaging: I have reviewed all pertinent imaging results/findings within the past 24 hours.     X-ray Chest Pa And Lateral    Result Date: 5/14/2019  EXAMINATION: XR CHEST PA AND LATERAL CLINICAL HISTORY: Shortness of breath TECHNIQUE: PA and lateral views of the chest were performed. COMPARISON: 04/19/2019 FINDINGS: The cardiomediastinal silhouette is enlarged, similar to the previous exam.  There obscuration of the costophrenic angles, right greater than left suggesting pleural effusion.  The trachea is midline.  The lungs are symmetrically expanded bilaterally with patchy increased interstitial and parenchymal attenuation primarily projected over the right mid and lower lung zones, somewhat improved as compared to the previous examination.  There is patchy parenchymal attenuation projected over the left lower lung zone..  There is no pneumothorax.  The osseous structures are remarkable for degenerative change..     1. Somewhat improved aeration as compared to the previous examination, this may reflect improving pulmonary edema in this patient with cardiomegaly.  Developing left consolidation however is not excluded.  There remains right pleural fluid.     Us Abdomen Limited    Result Date: 4/18/2019  EXAMINATION: US ABDOMEN LIMITED CLINICAL HISTORY: Elevated liver function tests. TECHNIQUE: Limited ultrasound of the right upper quadrant of the abdomen including pancreas, liver, gallbladder, common bile duct was performed. COMPARISON: Ultrasound retroperitoneal complete dated 04/18/2019. FINDINGS: Liver: Normal in size, measuring 14.7 cm. Homogeneous echotexture. No focal hepatic lesions.  Hepatic resistive index measured 1.1. Gallbladder: Stone versus polyp in the gallbladder measuring 0.5 cm  "with the gallbladder wall at the upper limit of normal in thickness.  There is no gallbladder wall hyperemia or surrounding pericholecystic fluid.  No sonographic Pham's sign. Biliary system: The common duct is not dilated, measuring 3 mm.  No intrahepatic ductal dilatation. Spleen: Normal in size with a homogeneous echotexture, measuring 10.9 x 5.0 cm. Pancreas: The visualized portions of pancreas appear normal. Miscellaneous: The IVC and intrahepatic vasculature are prominent in caliber.  Nonvisualization of previous ascites.     Cholelithiasis versus gallbladder polyp with no convincing evidence of cholecystitis.  No intra or extrahepatic biliary ductal dilatation.     Assessment/Plan:     * Acute on chronic combined systolic (congestive) and diastolic (congestive) heart failure  Cardiomyopathy  Hypervolemic on exam and has been noncompliant with lasix dosing. Wt at discharge about 280 and now 267 on admission.    - c/w Lasix 80 mg IVP BID.   Patient was overdiruesed during previous admission with lasix 80 mg IVP TID and metolazone 2.5 AM   - c/w ARB. Not currently on GDMT, as BP unable to tolerate add'l agent.   - strict I/O, daily weights, tele  - cardiac diet, fluid restriction  - CXR with evidence of pulmonary edema.  Possible developing RLL consolidation, but procal negative no other infectious sxs present.  - sxs of heart failure are chronic.  Can likely be discharged in the AM as patient not requiring oxygen and has been diuresing well at home.  Will need wound care f/u.      Atrial fibrillation, chronic  Chronic anticoagulation  Not on BB, AC with warfarin. Hx CVA and PE.  - c/w warfarin 6 mg PO daily (INR 2.2 on admission)  - daily INR  - has recently been compliant with warfarin clinic. Per previous provider "Extensive discussion with patient on switching to DOAC, not interested in this time.  Refusing to follow up in [facility] [place] [facility]. Only wants to go to his PCP Dr. Swift, but patient had not been " "since October. He is a poor candidate for warfarin, but refusing to switch to DOAC."  - CHADSVASC 3  - monitor on tele    GERD (gastroesophageal reflux disease)  Chest burning since this AM.  WIll try GI cocktail for suspected GERD vs MSK pain.  Multiple previous presentations for chest pain believed to be MSK. 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     Venous stasis of lower extremity  Significant edema and multiple bullae from heart failure. C/w diuresis, leg compression, elevation  - sitting in chair on evaluation and refusing to wears compression socks  - Wound care consulted and appreciate recs    Noncompliance with diet and medication regimen  Recurrent problem    Essential hypertension  Controlled   - c/w losartan 12.5 mg PO daily    VTE Risk Mitigation (From admission, onward)        Ordered     warfarin tablet 6 mg  Daily      05/14/19 2122     IP VTE HIGH RISK PATIENT  Once      05/14/19 2122     Place CAMI hose  Until discontinued      05/14/19 2122     Place sequential compression device  Until discontinued      05/14/19 2122             Robb Claros PA-C  Department of Hospital Medicine   [facility]  "

## 2019-05-15 NOTE — ASSESSMENT & PLAN NOTE
Cardiomyopathy  Hypervolemic on exam and has been noncompliant with lasix dosing. Wt at discharge about 280 and now 267 on admission.    - c/w Lasix 80 mg IVP BID.   Patient was overdiruesed during previous admission with lasix 80 mg IVP TID and metolazone 2.5 AM   - c/w ARB. Not currently on GDMT, as BP unable to tolerate add'l agent.   - strict I/O, daily weights, tele  - cardiac diet, fluid restriction  - CXR with evidence of pulmonary edema.  Possible developing RLL consolidation, but procal negative no other infectious sxs present.  - sxs of heart failure are chronic.  Can likely be discharged in the AM as patient not requiring oxygen and has been diuresing well at home.  Will need wound care f/u.

## 2019-05-15 NOTE — SUBJECTIVE & OBJECTIVE
Past Medical History:   Diagnosis Date    Anticoagulant long-term use     Cardiomegaly     CHF (congestive heart failure)     Chronic combined systolic and diastolic congestive heart failure     Coronary artery disease     CVA (cerebrovascular accident)     Heart attack 2006    Hypertension     Hyperthyroidism, subclinical 1/2/2013    MI (myocardial infarction) 9/22/2013    MI in 2009      Paroxysmal atrial fibrillation     PE (pulmonary embolism) 1/1/2013    IN 2010     S/P ablation of atrial flutter 2008    Stroke 2009    no residual weaknesses       Past Surgical History:   Procedure Laterality Date    RADIOFREQUENCY ABLATION  01/08/2008    for atrial flutter       Review of patient's allergies indicates:   Allergen Reactions    Acetaminophen      Itching    Oxycodone-acetaminophen      Other reaction(s): Itching    Ace inhibitors Other (See Comments)     cough       No current facility-administered medications on file prior to encounter.      Current Outpatient Medications on File Prior to Encounter   Medication Sig    albuterol (PROVENTIL/VENTOLIN HFA) 90 mcg/actuation inhaler Inhale 1-2 puffs into the lungs every 6 (six) hours as needed for Wheezing. Rescue    aspirin (ECOTRIN) 81 MG EC tablet Take 81 mg by mouth once daily.    ferrous sulfate 325 (65 FE) MG EC tablet Take 1 tablet (325 mg total) by mouth once daily.    furosemide (LASIX) 40 MG tablet Take 80 mg by mouth 2 (two) times daily.    losartan (COZAAR) 25 MG tablet Take 0.5 tablets (12.5 mg total) by mouth once daily.    nitroGLYCERIN (NITROSTAT) 0.4 MG SL tablet Place 1 tablet (0.4 mg total) under the tongue every 5 (five) minutes as needed for Chest pain. Tablet, Sublingual Sublingual    warfarin (COUMADIN) 6 MG tablet Take 1 tablet (6 mg total) by mouth Daily.     Family History     Problem Relation (Age of Onset)    Alcohol abuse Father    Hypertension Mother, Father, Sister, Brother    Stroke Mother        Tobacco Use     Smoking status: Never Smoker    Smokeless tobacco: Never Used   Substance and Sexual Activity    Alcohol use: No    Drug use: No    Sexual activity: Never     Review of Systems   Constitutional: Positive for fatigue (with ambulation; not with biking.). Negative for chills, fever and unexpected weight change.   HENT: Negative for ear pain and sore throat.    Eyes: Negative for pain and visual disturbance.   Respiratory: Positive for shortness of breath. Negative for cough.         +orthopnea   Cardiovascular: Positive for chest pain and leg swelling. Negative for palpitations.   Gastrointestinal: Negative for abdominal pain, diarrhea, nausea and vomiting.   Endocrine: Negative for heat intolerance and polydipsia.   Genitourinary: Negative for dysuria, frequency and urgency.   Musculoskeletal: Negative for back pain and gait problem.   Skin: Negative for rash and wound.   Neurological: Negative for dizziness and weakness.   Psychiatric/Behavioral: Negative for confusion. The patient is not nervous/anxious.      Objective:     Vital Signs (Most Recent):  Temp: 97.5 °F (36.4 °C) (05/14/19 2116)  Pulse: 80 (05/14/19 2300)  Resp: 16 (05/14/19 2116)  BP: 138/70 (05/14/19 2116)  SpO2: 97 % (05/14/19 2116) Vital Signs (24h Range):  Temp:  [97.5 °F (36.4 °C)-98.7 °F (37.1 °C)] 97.5 °F (36.4 °C)  Pulse:  [78-80] 80  Resp:  [16-18] 16  SpO2:  [95 %-97 %] 97 %  BP: (126-138)/(70) 138/70     Weight: 121.4 kg (267 lb 10.2 oz)  Body mass index is 39.52 kg/m².    Physical Exam   Constitutional: He is oriented to person, place, and time. He appears well-developed and well-nourished. No distress.   HENT:   Head: Normocephalic and atraumatic.   Eyes: Pupils are equal, round, and reactive to light. Conjunctivae and EOM are normal.   Neck: Normal range of motion. Neck supple.   Cardiovascular: Normal rate and normal heart sounds. An irregularly irregular rhythm present. Exam reveals decreased pulses (DP bilaterally).    Pulmonary/Chest: Effort normal. He has decreased breath sounds in the right middle field, the right lower field, the left middle field and the left lower field. He has no wheezes. He has no rhonchi. He has no rales.   Abdominal: Soft. Bowel sounds are normal. He exhibits no distension. There is no tenderness.   Musculoskeletal: Normal range of motion. He exhibits edema and tenderness.   BLE pitting edema from feet, superior to knees with TTP and induration up to knees  Sensation intact to feet/toes and weak pulses present   Neurological: He is alert and oriented to person, place, and time.   Skin: Skin is warm and dry. He is not diaphoretic.   BLE woody appearance with multiple small bulla and dry skin   Psychiatric: He has a normal mood and affect. His behavior is normal. Judgment and thought content normal.   Nursing note and vitals reviewed.            CRANIAL NERVES     CN III, IV, VI   Pupils are equal, round, and reactive to light.  Extraocular motions are normal.        Significant Labs:   CBC:   Recent Labs   Lab 05/14/19 1802   WBC 5.54   HGB 7.9*   HCT 25.6*        CMP:   Recent Labs   Lab 05/14/19  1802      K 4.3      CO2 26   GLU 86   BUN 21*   CREATININE 1.4   CALCIUM 9.6   PROT 8.4   ALBUMIN 3.6   BILITOT 1.2*   ALKPHOS 191*   AST 16   ALT 8*   ANIONGAP 10   EGFRNONAA 57.4*     Troponin:   Recent Labs   Lab 05/14/19  1802   TROPONINI 0.041*       Significant Imaging: I have reviewed all pertinent imaging results/findings within the past 24 hours.     X-ray Chest Pa And Lateral    Result Date: 5/14/2019  EXAMINATION: XR CHEST PA AND LATERAL CLINICAL HISTORY: Shortness of breath TECHNIQUE: PA and lateral views of the chest were performed. COMPARISON: 04/19/2019 FINDINGS: The cardiomediastinal silhouette is enlarged, similar to the previous exam.  There obscuration of the costophrenic angles, right greater than left suggesting pleural effusion.  The trachea is midline.  The lungs  are symmetrically expanded bilaterally with patchy increased interstitial and parenchymal attenuation primarily projected over the right mid and lower lung zones, somewhat improved as compared to the previous examination.  There is patchy parenchymal attenuation projected over the left lower lung zone..  There is no pneumothorax.  The osseous structures are remarkable for degenerative change..     1. Somewhat improved aeration as compared to the previous examination, this may reflect improving pulmonary edema in this patient with cardiomegaly.  Developing left consolidation however is not excluded.  There remains right pleural fluid.     Us Abdomen Limited    Result Date: 4/18/2019  EXAMINATION: US ABDOMEN LIMITED CLINICAL HISTORY: Elevated liver function tests. TECHNIQUE: Limited ultrasound of the right upper quadrant of the abdomen including pancreas, liver, gallbladder, common bile duct was performed. COMPARISON: Ultrasound retroperitoneal complete dated 04/18/2019. FINDINGS: Liver: Normal in size, measuring 14.7 cm. Homogeneous echotexture. No focal hepatic lesions.  Hepatic resistive index measured 1.1. Gallbladder: Stone versus polyp in the gallbladder measuring 0.5 cm with the gallbladder wall at the upper limit of normal in thickness.  There is no gallbladder wall hyperemia or surrounding pericholecystic fluid.  No sonographic Pham's sign. Biliary system: The common duct is not dilated, measuring 3 mm.  No intrahepatic ductal dilatation. Spleen: Normal in size with a homogeneous echotexture, measuring 10.9 x 5.0 cm. Pancreas: The visualized portions of pancreas appear normal. Miscellaneous: The IVC and intrahepatic vasculature are prominent in caliber.  Nonvisualization of previous ascites.     Cholelithiasis versus gallbladder polyp with no convincing evidence of cholecystitis.  No intra or extrahepatic biliary ductal dilatation.

## 2019-05-15 NOTE — HPI
"Hamlet Terrell is a 53 y.o. with pmhx combined systolic and diastolic HF (4/2019 EF 23%, RV systolic dysfunction, mod MR, TR), HTN, Afib on warfarin, CVA, CAD who presented to ED c/o bleeding from his gums. Per patient bleeding from lower gums since Sunday. He states this continued until this AM when it self resolved. He reports hx of epistaxis as well.  He states that he has been compliant with his coumadin. During ED evaluation he complained of chest pain.  He reports new onset non-radiating, pleuritic substernal chest "burning".  Pain has been constant since 10:30 am, not relieved with nitro, and sometimes relieved with rest.  Pain worse with urination. Patient reports chronic orthopnea, MUÑOZ which is not worse than baseline.  He reports no wt changes, but states that he was 287 at discharge and is 267 today. He also states that he has had worsening BLE edema with "blisters" and difficulty keeping skin intact, especially during showers. He denies f/c, cough, wheezing, N/V, headaches, dizziness, vision changes.  He reports that he has only been taking 40 mg PO BID of lasix instead of 80 mg BID.  He has been urinating "constantly" at home.      Of note, recently admitted 4/15-4/23 for acute CHF exacerbation. TTE showed EF 23%, diastolic dysfunction, severe KASEY, pulmonary htn, septal WMA, moderate mitral/tricuspid regurg, and elevated CVP.  Cardiology consulted for evaluation of life vest vs. AICD for patient given runs of vtach and poor EF. Patient refused AICD/ life vest.  Patient developed epistaxis which resolved with holding warfarin (INR was supratherapeutic). Patient educated on switching from coumadin to DOAC; patient refused.    ED: VSS, no leukocytosis.  EKG non-ischemic, shows afib.  Trop .041 (lower than 4 weeks ago) in setting of CKD. Procal .11. CXR shows "Somewhat improved aeration as compared to the previous examination, this may reflect improving pulmonary edema in this patient with cardiomegaly. " "Developing left consolidation however is not excluded. There remains right pleural fluid."  Given lasix 80 mg IVP.   "

## 2019-05-15 NOTE — ASSESSMENT & PLAN NOTE
Chest burning since this AM.  WIll try GI cocktail for suspected GERD vs MSK pain.  Multiple previous presentations for chest pain believed to be MSK. Chest tenderness on exam  - trop .041- will recheck  - EKG non-ischemic

## 2019-05-15 NOTE — PLAN OF CARE
This CM met with patient at bedside. Patient lives alone, his brother drives 18 wheelers he is on the road. His cousin brought him to the ED and he will possibly need transport home if no family available. Patient states he has a nurse that comes every Monday but could not remember the name of HH. Patient on warfarin and is current with [facility].      [id number] - [place], [place] - [address]  [address]  [place] [address]  Phone: [phone] Fax: [phone]    Payor: MEDICAID / Plan: [employer] [facility] / Product Type: Managed Medicaid /         05/15/19 1214   Discharge Assessment   Assessment Type Discharge Planning Assessment   Confirmed/corrected address and phone number on facesheet? Yes   Assessment information obtained from? Patient   Expected Length of Stay (days) 2   Communicated expected length of stay with patient/caregiver yes   Prior to hospitilization cognitive status: Alert/Oriented   Prior to hospitalization functional status: Independent   Current cognitive status: Alert/Oriented   Current Functional Status: Independent   Facility Arrived From:   (brought in by cousin)   Lives With alone   Able to Return to Prior Arrangements yes   Is patient able to care for self after discharge? Yes   Who are your caregiver(s) and their phone number(s)?   (brother Kris [phone])   Patient's perception of discharge disposition home or selfcare   Readmission Within the Last 30 Days no previous admission in last 30 days   Patient currently being followed by outpatient case management? No   Patient currently receives any other outside agency services? No   Equipment Currently Used at Home none   Do you have any problems affording any of your prescribed medications? No   Is the patient taking medications as prescribed? yes   Does the patient have transportation home? Yes   Transportation Anticipated family or friend will provide   Does the patient  receive services at the Coumadin Clinic? No   Discharge Plan A Home;Home Health   Discharge Plan B Home with family   DME Needed Upon Discharge  none   Patient/Family in Agreement with Plan yes     Krupa Ojeda RN/BSN/CM  [facility]  [phone]  Ortho/[facility]

## 2019-05-15 NOTE — SUBJECTIVE & OBJECTIVE
"Interval History: Patient given IV diuresis overnight with good UOP. Lungs CTAB. Restarted home lasix dose. Increased losartan dose from 12.5--> 25 mg and started on low dose BB.  Wound care saw patient for chronic lower extremity edema with chronic venous stasis. Wound care nurse and provider extensively educated patient and tried to get patient to elevate legs, but patient refused due to calf pain from putting pressure on it. When suggested sitting patient in chair and elevating legs on bed so calves would not be touching anything, patient unwilling to try or listen. Discussed extensively changing warfarin to apixiban, patient says he has to think about it. Revisited patient in afternoon to discuss and discharge, patient said he still has to think about it, but had no additional questions or reasons for why he was unwilling to try. Patient reports burning chest pain made worse by Gi cocktail, protonix, mylanta. Repeat troponin elevated but lower than previous admission, elevation due to intermittent runs of vtach. Drug seeking behavior. Given oxycodone 5 mg for pain, and patient told nurse he needed at least 20 mg. Told provider if he got discharged he would "go downstairs to the ED and check back in". Patient does not have a ride home this evening, will be discharged first thing in am.     Review of Systems   Constitutional: Negative for chills and fatigue.   HENT: Negative for congestion and rhinorrhea.    Respiratory: Negative for cough and shortness of breath.    Cardiovascular: Positive for chest pain (burning) and leg swelling. Negative for palpitations.   Gastrointestinal: Negative for abdominal pain and diarrhea.   Genitourinary: Negative for dysuria and hematuria.   Musculoskeletal: Negative for back pain and neck pain.   Skin: Positive for rash (chronic venous stasis on BLE).   Neurological: Negative for dizziness and seizures.   Psychiatric/Behavioral: Negative for agitation and behavioral problems. "     Objective:     Vital Signs (Most Recent):  Temp: 97.4 °F (36.3 °C) (05/15/19 1146)  Pulse: 75 (05/15/19 1534)  Resp: 17 (05/15/19 1146)  BP: 132/61 (05/15/19 1146)  SpO2: (!) 94 % (05/15/19 1146) Vital Signs (24h Range):  Temp:  [97.4 °F (36.3 °C)-98.2 °F (36.8 °C)] 97.4 °F (36.3 °C)  Pulse:  [66-83] 75  Resp:  [16-20] 17  SpO2:  [94 %-100 %] 94 %  BP: (132-156)/(61-85) 132/61     Weight: 121.4 kg (267 lb 10.2 oz)  Body mass index is 39.52 kg/m².    Intake/Output Summary (Last 24 hours) at 5/15/2019 1752  Last data filed at 5/15/2019 1500  Gross per 24 hour   Intake 840 ml   Output 3675 ml   Net -2835 ml      Physical Exam   Constitutional: He is oriented to person, place, and time. He appears well-developed and well-nourished.   HENT:   Head: Normocephalic and atraumatic.   Eyes: Pupils are equal, round, and reactive to light. EOM are normal.   Neck: Normal range of motion. Neck supple.   Cardiovascular: An irregularly irregular rhythm present. Exam reveals decreased pulses (DP bilaterally).   Pulmonary/Chest: Effort normal and breath sounds normal. He has no rales.   Abdominal: Soft. Bowel sounds are normal.   Musculoskeletal: He exhibits edema and tenderness.   BLE pitting edema from feet, superior to knees with TTP and induration up to knees  Sensation intact to feet/toes and weak pulses present   Neurological: He is alert and oriented to person, place, and time.   Skin: Skin is warm and dry. Capillary refill takes less than 2 seconds. Rash noted. There is erythema.   Psychiatric: He has a normal mood and affect. His behavior is normal.   Nursing note and vitals reviewed.      Significant Labs:   CBC:   Recent Labs   Lab 05/14/19  1802 05/15/19  0353   WBC 5.54 6.70   HGB 7.9* 8.2*   HCT 25.6* 27.1*    210     CMP:   Recent Labs   Lab 05/14/19  1802 05/15/19  0353    142  141   K 4.3 4.0  3.9    104  103   CO2 26 27  27   GLU 86 126*  126*   BUN 21* 22*  21*   CREATININE 1.4 1.3   1.4   CALCIUM 9.6 9.8  10.0   PROT 8.4  --    ALBUMIN 3.6  --    BILITOT 1.2*  --    ALKPHOS 191*  --    AST 16  --    ALT 8*  --    ANIONGAP 10 11  11   EGFRNONAA 57.4* >60.0  57.4*     Coagulation:   Recent Labs   Lab 05/15/19  0353   INR 2.0*   APTT 34.4*     Magnesium:   Recent Labs   Lab 05/15/19  0353   MG 1.8     Troponin:   Recent Labs   Lab 05/14/19  1802 05/14/19  2358 05/15/19  1519   TROPONINI 0.041* 0.049* 0.056*       Significant Imaging: I have reviewed all pertinent imaging results/findings within the past 24 hours.

## 2019-05-15 NOTE — PLAN OF CARE
Problem: Adult Inpatient Plan of Care  Goal: Plan of Care Review  Outcome: Ongoing (interventions implemented as appropriate)     05/15/19 1646   Plan of Care Review   Plan of Care Reviewed With patient   Progress no change       Problem: Wound  Goal: Optimal Wound Healing    Intervention: Promote Effective Wound Healing     05/15/19 1646   Monitor and Manage Anemia   Oral Nutrition Promotion physical activity promoted;safe use of adaptive equipment encouraged   Prevent or Manage Pain   Pain Management Interventions around-the-clock dosing utilized;quiet environment facilitated;pillow support provided;position adjusted

## 2019-05-15 NOTE — NURSING
Pt given GI coctail. About a minute of receiving pt stated he had a 10/10 pain. Pt stated medication made pain worse and he needed pain medication. Notified toiñto HODGE.

## 2019-05-15 NOTE — ASSESSMENT & PLAN NOTE
Significant edema and multiple bullae from heart failure. C/w diuresis, leg compression, elevation  - sitting in chair on evaluation and refusing to wears compression socks  - Wound care consulted and appreciate recs

## 2019-05-15 NOTE — NURSING
Ernesto and received call back from Hazel reported pt complained of chest pain to center of chest and requesting pain medication. Reported to her that he stated that the GI cocktail made his chest hurt worse and that Pepcid made his whole body burn. She stated that she will order some Tums

## 2019-05-16 VITALS
SYSTOLIC BLOOD PRESSURE: 103 MMHG | WEIGHT: 256.38 LBS | HEIGHT: 69 IN | OXYGEN SATURATION: 97 % | DIASTOLIC BLOOD PRESSURE: 59 MMHG | RESPIRATION RATE: 18 BRPM | BODY MASS INDEX: 37.97 KG/M2 | HEART RATE: 66 BPM | TEMPERATURE: 98 F

## 2019-05-16 LAB
ANION GAP SERPL CALC-SCNC: 10 MMOL/L (ref 8–16)
ANION GAP SERPL CALC-SCNC: 9 MMOL/L (ref 8–16)
BASOPHILS # BLD AUTO: 0.06 K/UL (ref 0–0.2)
BASOPHILS NFR BLD: 0.9 % (ref 0–1.9)
BUN SERPL-MCNC: 25 MG/DL (ref 6–20)
BUN SERPL-MCNC: 27 MG/DL (ref 6–20)
CALCIUM SERPL-MCNC: 9.8 MG/DL (ref 8.7–10.5)
CALCIUM SERPL-MCNC: 9.9 MG/DL (ref 8.7–10.5)
CHLORIDE SERPL-SCNC: 101 MMOL/L (ref 95–110)
CHLORIDE SERPL-SCNC: 101 MMOL/L (ref 95–110)
CO2 SERPL-SCNC: 29 MMOL/L (ref 23–29)
CO2 SERPL-SCNC: 30 MMOL/L (ref 23–29)
CREAT SERPL-MCNC: 1.7 MG/DL (ref 0.5–1.4)
CREAT SERPL-MCNC: 1.8 MG/DL (ref 0.5–1.4)
DIFFERENTIAL METHOD: ABNORMAL
EOSINOPHIL # BLD AUTO: 0.5 K/UL (ref 0–0.5)
EOSINOPHIL NFR BLD: 6.5 % (ref 0–8)
ERYTHROCYTE [DISTWIDTH] IN BLOOD BY AUTOMATED COUNT: 15.7 % (ref 11.5–14.5)
EST. GFR  (AFRICAN AMERICAN): 48.9 ML/MIN/1.73 M^2
EST. GFR  (AFRICAN AMERICAN): 52.4 ML/MIN/1.73 M^2
EST. GFR  (NON AFRICAN AMERICAN): 42.3 ML/MIN/1.73 M^2
EST. GFR  (NON AFRICAN AMERICAN): 45.4 ML/MIN/1.73 M^2
GLUCOSE SERPL-MCNC: 101 MG/DL (ref 70–110)
GLUCOSE SERPL-MCNC: 99 MG/DL (ref 70–110)
HCT VFR BLD AUTO: 26.7 % (ref 40–54)
HGB BLD-MCNC: 8.2 G/DL (ref 14–18)
IMM GRANULOCYTES # BLD AUTO: 0.01 K/UL (ref 0–0.04)
IMM GRANULOCYTES NFR BLD AUTO: 0.1 % (ref 0–0.5)
INR PPP: 1.9 (ref 0.8–1.2)
LYMPHOCYTES # BLD AUTO: 1.1 K/UL (ref 1–4.8)
LYMPHOCYTES NFR BLD: 16.3 % (ref 18–48)
MCH RBC QN AUTO: 25.7 PG (ref 27–31)
MCHC RBC AUTO-ENTMCNC: 30.7 G/DL (ref 32–36)
MCV RBC AUTO: 84 FL (ref 82–98)
MONOCYTES # BLD AUTO: 1.3 K/UL (ref 0.3–1)
MONOCYTES NFR BLD: 19 % (ref 4–15)
NEUTROPHILS # BLD AUTO: 3.9 K/UL (ref 1.8–7.7)
NEUTROPHILS NFR BLD: 57.2 % (ref 38–73)
NRBC BLD-RTO: 0 /100 WBC
PLATELET # BLD AUTO: 222 K/UL (ref 150–350)
PMV BLD AUTO: 10.4 FL (ref 9.2–12.9)
POTASSIUM SERPL-SCNC: 4.2 MMOL/L (ref 3.5–5.1)
POTASSIUM SERPL-SCNC: 4.2 MMOL/L (ref 3.5–5.1)
PROTHROMBIN TIME: 18.1 SEC (ref 9–12.5)
RBC # BLD AUTO: 3.19 M/UL (ref 4.6–6.2)
SODIUM SERPL-SCNC: 140 MMOL/L (ref 136–145)
SODIUM SERPL-SCNC: 140 MMOL/L (ref 136–145)
WBC # BLD AUTO: 6.89 K/UL (ref 3.9–12.7)

## 2019-05-16 PROCEDURE — 99226 PR SUBSEQUENT OBSERVATION CARE,LEVEL III: CPT | Mod: ,,, | Performed by: INTERNAL MEDICINE

## 2019-05-16 PROCEDURE — 99226 PR SUBSEQUENT OBSERVATION CARE,LEVEL III: ICD-10-PCS | Mod: ,,, | Performed by: INTERNAL MEDICINE

## 2019-05-16 PROCEDURE — 80048 BASIC METABOLIC PNL TOTAL CA: CPT

## 2019-05-16 PROCEDURE — 36415 COLL VENOUS BLD VENIPUNCTURE: CPT

## 2019-05-16 PROCEDURE — 80048 BASIC METABOLIC PNL TOTAL CA: CPT | Mod: 91

## 2019-05-16 PROCEDURE — 25000003 PHARM REV CODE 250: Performed by: PHYSICIAN ASSISTANT

## 2019-05-16 PROCEDURE — 85610 PROTHROMBIN TIME: CPT

## 2019-05-16 PROCEDURE — G0378 HOSPITAL OBSERVATION PER HR: HCPCS

## 2019-05-16 PROCEDURE — 85025 COMPLETE CBC W/AUTO DIFF WBC: CPT

## 2019-05-16 RX ORDER — CALCIUM CARBONATE 200(500)MG
500 TABLET,CHEWABLE ORAL 3 TIMES DAILY PRN
Status: ON HOLD | COMMUNITY
Start: 2019-05-16 | End: 2019-12-06

## 2019-05-16 RX ORDER — SUCRALFATE 1 G/1
1 TABLET ORAL
Status: DISCONTINUED | OUTPATIENT
Start: 2019-05-16 | End: 2019-05-16 | Stop reason: HOSPADM

## 2019-05-16 RX ORDER — METOPROLOL SUCCINATE 25 MG/1
25 TABLET, EXTENDED RELEASE ORAL DAILY
Qty: 30 TABLET | Refills: 3 | Status: ON HOLD | OUTPATIENT
Start: 2019-05-17 | End: 2019-07-17 | Stop reason: HOSPADM

## 2019-05-16 RX ORDER — PANTOPRAZOLE SODIUM 40 MG/1
40 TABLET, DELAYED RELEASE ORAL DAILY
Qty: 30 TABLET | Refills: 11 | Status: SHIPPED | OUTPATIENT
Start: 2019-05-17 | End: 2019-12-24 | Stop reason: CLARIF

## 2019-05-16 RX ORDER — BISMUTH SUBSALICYLATE 525 MG/30ML
30 LIQUID ORAL EVERY 6 HOURS PRN
Status: DISCONTINUED | OUTPATIENT
Start: 2019-05-16 | End: 2019-05-16 | Stop reason: HOSPADM

## 2019-05-16 RX ADMIN — ASPIRIN 81 MG: 81 TABLET, COATED ORAL at 09:05

## 2019-05-16 RX ADMIN — PANTOPRAZOLE SODIUM 40 MG: 40 TABLET, DELAYED RELEASE ORAL at 09:05

## 2019-05-16 RX ADMIN — ALUMINUM HYDROXIDE, MAGNESIUM HYDROXIDE, AND SIMETHICONE 50 ML: 200; 200; 20 SUSPENSION ORAL at 03:05

## 2019-05-16 RX ADMIN — ALUMINUM HYDROXIDE, MAGNESIUM HYDROXIDE, AND DIMETHICONE 30 ML: 400; 400; 40 SUSPENSION ORAL at 09:05

## 2019-05-16 RX ADMIN — SUCRALFATE 1 G: 1 TABLET ORAL at 09:05

## 2019-05-16 RX ADMIN — WARFARIN SODIUM 6 MG: 5 TABLET ORAL at 05:05

## 2019-05-16 RX ADMIN — ONDANSETRON 4 MG: 4 TABLET, ORALLY DISINTEGRATING ORAL at 12:05

## 2019-05-16 RX ADMIN — METOPROLOL SUCCINATE 25 MG: 25 TABLET, EXTENDED RELEASE ORAL at 09:05

## 2019-05-16 RX ADMIN — FERROUS SULFATE TAB EC 325 MG (65 MG FE EQUIVALENT) 325 MG: 325 (65 FE) TABLET DELAYED RESPONSE at 09:05

## 2019-05-16 NOTE — ASSESSMENT & PLAN NOTE
Chest burning since this AM.  WIll try GI cocktail for suspected GERD vs MSK pain.  Multiple previous presentations for chest pain believed to be MSK. Chest tenderness on exam  - trop .041- elevated likely from runs of vtach, lower than previous admission  - EKG non-ischemic

## 2019-05-16 NOTE — PLAN OF CARE
Problem: Adult Inpatient Plan of Care  Goal: Plan of Care Review  Outcome: Ongoing (interventions implemented as appropriate)  Patient persistently reports unresolved chest.  Mild relief achieved with GI cocktail supported by PRN antacids.    Patient reports extreme dissatisfaction w/ PCP and requests to have another primary provider to evaluate and manage his care for the duration of his stay. Communicated patient needs to charge nurse.

## 2019-05-16 NOTE — DISCHARGE SUMMARY
"[facility] Medicine  Discharge Summary      Patient Name: Hamlet Terrell  MRN: [id number]  Admission Date: 5/14/2019  Hospital Length of Stay: 0 days  Discharge Date and Time: 5/16/2019  7:17 PM  Attending Physician: Joselyn Isabel MD   Discharging Provider: Hazel Noriega PA-C  Primary Care Provider: Carlin Swift MD  [facility] Medicine Team: [facility] Hazel Noriega PA-C    HPI:   Hamlet Terrell is a 53 y.o. with pmhx combined systolic and diastolic HF (4/2019 EF 23%, RV systolic dysfunction, mod MR, TR), HTN, Afib on warfarin, CVA, CAD who presented to ED c/o bleeding from his gums. Per patient bleeding from lower gums since Sunday. He states this continued until this AM when it self resolved. He reports hx of epistaxis as well.  He states that he has been compliant with his coumadin. During ED evaluation he complained of chest pain.  He reports new onset non-radiating, pleuritic substernal chest "burning".  Pain has been constant since 10:30 am, not relieved with nitro, and sometimes relieved with rest.  Pain worse with urination. Patient reports chronic orthopnea, MUÑOZ which is not worse than baseline.  He reports no wt changes, but states that he was 287 at discharge and is 267 today. He also states that he has had worsening BLE edema with "blisters" and difficulty keeping skin intact, especially during showers. He denies f/c, cough, wheezing, N/V, headaches, dizziness, vision changes.  He reports that he has only been taking 40 mg PO BID of lasix instead of 80 mg BID.  He has been urinating "constantly" at home.      Of note, recently admitted 4/15-4/23 for acute CHF exacerbation. TTE showed EF 23%, diastolic dysfunction, severe KASEY, pulmonary htn, septal WMA, moderate mitral/tricuspid regurg, and elevated CVP.  Cardiology consulted for evaluation of life vest vs. AICD for patient given runs of vtach and poor EF. Patient refused AICD/ life vest.  Patient developed " "epistaxis which resolved with holding warfarin (INR was supratherapeutic). Patient educated on switching from coumadin to DOAC; patient refused.    ED: VSS, no leukocytosis.  EKG non-ischemic, shows afib.  Trop .041 (lower than 4 weeks ago) in setting of CKD. Procal .11. CXR shows "Somewhat improved aeration as compared to the previous examination, this may reflect improving pulmonary edema in this patient with cardiomegaly. Developing left consolidation however is not excluded. There remains right pleural fluid."  Given lasix 80 mg IVP.     * No surgery found *      Hospital Course:   Mr. Terrell was admitted to observation for CHF exacerbation. Started on IV Lasix 80 mg--> 80 PO BID. Complaining of burning chest pain, made worse by GI cocktail, mylanta. However, overnight GI cocktail gave mild improvement in pain. Requesting narcotic medication. Drug seeking behavior. Started on daily Protonix, Troponin elevated but lower than previous. Repeat troponin trended up, but in setting of runs of vtach. Low concern for ACS. Patient not interested in life vest. Discussed extensively with patient switching from warfarin to apixaban, but patient not interested. Patient threatening to return to ED if discharged. Cr jumped from 1.4-->1.8 as expected in setting of increased ARB. Held lasix, ARB today and decreased ARB dose at discharge. Patient physically threatened provider. Repeat labs ordered for 1 week to be sent to patients PCP. Security and attending accompanied provider for discharge. Patient discharged on home dose of lasix, losartan, and started on protonix daily and metoprolol.  Patient without questions, verbalized understanding. Patient to follow up with PCP in 1 week.     Consults:     * Acute on chronic combined systolic (congestive) and diastolic (congestive) heart failure  Cardiomyopathy  Hypervolemic on exam and has been noncompliant with lasix dosing. Wt at discharge about 280 and now 267 on admission.    - " c/w Lasix 80 mg IVP BID.   Patient was overdiruesed during previous admission with lasix 80 mg IVP TID and metolazone 2.5 AM --> restarted home dose Lasix 80 PO BID  - c/w ARB. Not currently on GDMT, as BP unable to tolerate add'l agent.   - strict I/O, daily weights, tele  - cardiac diet, fluid restriction  - CXR with evidence of pulmonary edema.  Possible developing RLL consolidation, but procal negative no other infectious sxs present.  - sxs of heart failure are chronic.   - discharged on home meds with addition of  low dose metoprolol     GERD (gastroesophageal reflux disease)  Chest burning since this AM.  WIll try GI cocktail for suspected GERD vs MSK pain.  Multiple previous presentations for chest pain believed to be MSK. Chest tenderness on exam  - trop .041- elevated likely from runs of vtach, lower than previous admission. Low concern for ACS.   - Patient given GI cocktail, mylanta which patient said made his whole body burn. However, given GI cocktail overnight with mild improvement in symptoms.   - EKG non-ischemic  - discharged with protonix daily    Venous stasis of lower extremity  Significant edema and multiple bullae from heart failure. C/w diuresis, leg compression, elevation  - sitting in chair on evaluation and refusing to wears compression socks  - Wound care consulted and appreciate recs  - patient non-compliant with elevating foot after extensive discussion with both provider and wound care  - unlikely to heal unless patient complies with elevation    Chronic kidney disease  JEAN-PIERRE  CKD 2-3 with baseline Cr ~1.3  - jump in Cr from 1.4--> 1.8 as to be expected from increased ARB  - Lasix and ARB held  - restart Lasix and home dose losartan at discharge  - CMP in 1 week to monitor renal function, results sent to PCP    Coronary artery disease  - c/w ASA  - not on statin; lipid panel shows low HDL  - will need close cardiology f/u    Noncompliance with diet and medication regimen  Recurrent  "problem    Essential hypertension  Controlled   - increased losartan from 12.5 to 25 mg PO daily, added low dose metoprolol   - JEAN-PIERRE from increased losartan  - discharged on lower dose    Atrial fibrillation, chronic  Chronic anticoagulation  - AC with warfarin. Hx CVA and PE.  - c/w warfarin 6 mg PO daily (INR 2.2 on admission)  - daily INR  - has recently been compliant with warfarin clinic. Per previous provider "Extensive discussion with patient on switching to DOAC, not interested in this time.  Refusing to follow up [place] [facility]. Only wants to go to his PCP Dr. Swift, but patient had not been since October. He is a poor candidate for warfarin, but refusing to switch to DOAC."  - increased losartan from 12.5 to 25 mg with raise in Cr. Held, and discharged at home dose  - started on low dose metoprolol, will monitor bp  - CHADSVASC 3  - monitor on tele- afib with runs of vtach- patient not interested in AICD/ life vest  - discharged on home dose losartan and metoprolol 25mg daily  - follow up with PCP in 1 week      Final Active Diagnoses:    Diagnosis Date Noted POA    PRINCIPAL PROBLEM:  Acute on chronic combined systolic (congestive) and diastolic (congestive) heart failure [I50.43] 04/22/2019 Yes    Venous stasis of lower extremity [I87.8] 05/15/2019 Unknown    GERD (gastroesophageal reflux disease) [K21.9] 05/15/2019 Unknown    Venous stasis ulcer [I83.009, L97.909] 05/15/2019 Yes    Coronary artery disease [I25.10] 05/31/2016 Yes     Chronic    Chronic kidney disease [N18.9] 05/31/2016 Yes     Chronic    Noncompliance with diet and medication regimen [Z91.11, Z91.14] 11/25/2015 Not Applicable    TAPAN (obstructive sleep apnea) [G47.33] 01/11/2015 Yes    Essential hypertension [I10] 01/02/2013 Yes     Chronic    Atrial fibrillation, chronic [I48.2] 01/02/2013 Yes     Chronic    Cardiomyopathy [I42.9] 01/01/2013 Yes     Chronic    Chronic anticoagulation [Z79.01] 01/01/2013 Not Applicable    "  Chronic      Problems Resolved During this Admission:    Diagnosis Date Noted Date Resolved POA    Shortness of breath [R06.02] 05/14/2019 05/14/2019 Yes    Acute on chronic heart failure [I50.9] 05/14/2019 05/14/2019 Unknown       Discharged Condition: good    Disposition: Home or Self Care    Follow Up:  Follow-up Information     Carlin Swift MD In 1 week.    Specialty:  Family Medicine  Why:  walk-in clinic, no appointments needed  Contact information:  [address]  [place]  [place] [address]  [phone]             [facility]    Specialty:  Home Health Services  Why:  Home Health  Contact information:  [address] [place] [address]  [phone]                 Patient Instructions:      COMPREHENSIVE METABOLIC PANEL   Standing Status: Future Standing Exp. Date: 07/14/20   Order Comments: Please fax results to Patient's PCP Dr. Carlin Swift     Diet Cardiac     Notify your health care provider if you experience any of the following:  difficulty breathing or increased cough     Activity as tolerated       Significant Diagnostic Studies: Labs:   CMP   Recent Labs   Lab 05/15/19  0353 05/16/19  0307 05/16/19  1157     141 140 140   K 4.0  3.9 4.2 4.2     103 101 101   CO2 27  27 29 30*   *  126* 99 101   BUN 22*  21* 25* 27*   CREATININE 1.3  1.4 1.7* 1.8*   CALCIUM 9.8  10.0 9.9 9.8   ANIONGAP 11  11 10 9   ESTGFRAFRICA >60.0  >60.0 52.4* 48.9*   EGFRNONAA >60.0  57.4* 45.4* 42.3*   , CBC   Recent Labs   Lab 05/15/19  0353 05/16/19  0307   WBC 6.70 6.89   HGB 8.2* 8.2*   HCT 27.1* 26.7*    222    and INR   Lab Results   Component Value Date    INR 1.9 (H) 05/16/2019    INR 2.0 (H) 05/15/2019    INR 2.2 (H) 05/14/2019       Pending Diagnostic Studies:     None         Medications:  Reconciled Home Medications:      Medication List      START taking these medications    calcium carbonate 200 mg calcium (500 mg) chewable tablet  Commonly  known as:  TUMS  Take 1 tablet (500 mg total) by mouth 3 (three) times daily as needed.     metoprolol succinate 25 MG 24 hr tablet  Commonly known as:  TOPROL-XL  Take 1 tablet (25 mg total) by mouth once daily.  Start taking on:  5/17/2019     pantoprazole 40 MG tablet  Commonly known as:  PROTONIX  Take 1 tablet (40 mg total) by mouth once daily.  Start taking on:  5/17/2019        CONTINUE taking these medications    albuterol 90 mcg/actuation inhaler  Commonly known as:  PROVENTIL/VENTOLIN HFA  Inhale 1-2 puffs into the lungs every 6 (six) hours as needed for Wheezing. Rescue     aspirin 81 MG EC tablet  Commonly known as:  ECOTRIN  Take 81 mg by mouth once daily.     ferrous sulfate 325 (65 FE) MG EC tablet  Take 1 tablet (325 mg total) by mouth once daily.     furosemide 40 MG tablet  Commonly known as:  LASIX  Take 80 mg by mouth 2 (two) times daily.     losartan 25 MG tablet  Commonly known as:  COZAAR  Take 0.5 tablets (12.5 mg total) by mouth once daily.     nitroGLYCERIN 0.4 MG SL tablet  Commonly known as:  NITROSTAT  Place 1 tablet (0.4 mg total) under the tongue every 5 (five) minutes as needed for Chest pain. Tablet, Sublingual Sublingual     warfarin 6 MG tablet  Commonly known as:  COUMADIN  Take 1 tablet (6 mg total) by mouth Daily.            Indwelling Lines/Drains at time of discharge:   Lines/Drains/Airways          None          Time spent on the discharge of patient: 36 minutes  Patient was seen and examined on the date of discharge and determined to be suitable for discharge.         Hazel Noriega PA-C  Department of [facility] Medicine  [facility]

## 2019-05-16 NOTE — NURSING
Patient has had two emesis episodes since midnight. He attributes this to the oxycodone he took earlier for management of chest discomfort.    Spoke with HARRISON Grace about the matter.    Orders placed for GI cocktail and encouraged use of TUMS. Will follow.

## 2019-05-16 NOTE — NURSING
"PATIENT WAS MADE AWARE THAT HIS DISCHARGE COULD NOT BE APPEALED PER MD. INITIALLY, HE SEEMED UPSET BUT AGREED TO BE DISCHARGED. TELEMETRY MONITOR AND IV REMOVED. SITE WITHIN NORMAL LIMITS. PATIENT WAS NOT RECEPTIVE TO DISCHARGE TEACHING AND INSTRUCTIONS. HE DID NOT ACCEPT DISCHARGE PAPERWORK AND INSTRUCTED PRIMARY NURSE TO "THROW THEM IN THE TRASH." PATIENT EDUCATION PROVIDED. PATIENT'S PAPERWORK DISCARDED IN PATIENT'S PRIVACY SHREDDER. PATIENT STATED THAT HE WILL ATTEMPT TO GET IN CONTACT WITH HIS FRIEND WHO WILL PROVIDE HIM WITH TRANSPORTATION HOME. WILL CONTINUE TO MONITOR PATIENT'S STATUS.   "

## 2019-05-16 NOTE — ASSESSMENT & PLAN NOTE
Controlled   - increased losartan from 12.5 to 25 mg PO daily, added low dose metoprolol   - JEAN-PIERRE from increased losartan  - discharged on lower dose

## 2019-05-16 NOTE — PROGRESS NOTES
"[facility] Hospital Medicine  Progress Note    Patient Name: Hamlet Terrell  MRN: [id number]  Patient Class: OP- Observation   Admission Date: 5/14/2019  Length of Stay: 0 days  Attending Physician: Joselyn Isabel MD  Primary Care Provider: Carlin Swift MD    [facility] Medicine Team: [facility]OSP MED F Hazel Noriega PA-C    Subjective:     Principal Problem:Acute on chronic combined systolic (congestive) and diastolic (congestive) heart failure    HPI:  Hamlet Terrell is a 53 y.o. with pmhx combined systolic and diastolic HF (4/2019 EF 23%, RV systolic dysfunction, mod MR, TR), HTN, Afib on warfarin, CVA, CAD who presented to ED c/o bleeding from his gums. Per patient bleeding from lower gums since Sunday. He states this continued until this AM when it self resolved. He reports hx of epistaxis as well.  He states that he has been compliant with his coumadin. During ED evaluation he complained of chest pain.  He reports new onset non-radiating, pleuritic substernal chest "burning".  Pain has been constant since 10:30 am, not relieved with nitro, and sometimes relieved with rest.  Pain worse with urination. Patient reports chronic orthopnea, MUÑOZ which is not worse than baseline.  He reports no wt changes, but states that he was 287 at discharge and is 267 today. He also states that he has had worsening BLE edema with "blisters" and difficulty keeping skin intact, especially during showers. He denies f/c, cough, wheezing, N/V, headaches, dizziness, vision changes.  He reports that he has only been taking 40 mg PO BID of lasix instead of 80 mg BID.  He has been urinating "constantly" at home.      Of note, recently admitted 4/15-4/23 for acute CHF exacerbation. TTE showed EF 23%, diastolic dysfunction, severe KASEY, pulmonary htn, septal WMA, moderate mitral/tricuspid regurg, and elevated CVP.  Cardiology consulted for evaluation of life vest vs. AICD for patient given runs of vtach and poor EF. " "Patient refused AICD/ life vest.  Patient developed epistaxis which resolved with holding warfarin (INR was supratherapeutic). Patient educated on switching from coumadin to DOAC; patient refused.    ED: VSS, no leukocytosis.  EKG non-ischemic, shows afib.  Trop .041 (lower than 4 weeks ago) in setting of CKD. Procal .11. CXR shows "Somewhat improved aeration as compared to the previous examination, this may reflect improving pulmonary edema in this patient with cardiomegaly. Developing left consolidation however is not excluded. There remains right pleural fluid."  Given lasix 80 mg IVP.     Hospital Course:  Mr. Terrell was admitted to observation for CHF exacerbation. Started on IV Lasix 80 mg--> 80 PO BID. Complaining of burning chest pain, made worse by GI cocktail, mylanta, protonix. Requesting narcotic medication. Drug seeking behavior. Troponin elevated but lower than previous. Repeat troponin trended up, but in setting of runs of vtach. Discussed extensively with patient switching from warfarin to apixaban, but patient does not wish to for no apparent reason. Patient threatening to return to ED if discharged.     Interval History: Patient given IV diuresis overnight with good UOP. Lungs CTAB. Restarted home lasix dose. Increased losartan dose from 12.5--> 25 mg and started on low dose BB.  Wound care saw patient for chronic lower extremity edema with chronic venous stasis. Wound care nurse and provider extensively educated patient and tried to get patient to elevate legs, but patient refused due to calf pain from putting pressure on it. When suggested sitting patient in chair and elevating legs on bed so calves would not be touching anything, patient unwilling to try or listen. Discussed extensively changing warfarin to apixiban, patient says he has to think about it. Revisited patient in afternoon to discuss and discharge, patient said he still has to think about it, but had no additional questions or " "reasons for why he was unwilling to try. Patient reports burning chest pain made worse by Gi cocktail, protonix, mylanta. Repeat troponin elevated but lower than previous admission, elevation due to intermittent runs of vtach. Drug seeking behavior. Given oxycodone 5 mg for pain, and patient told nurse he needed at least 20 mg. Told provider if he got discharged he would "go downstairs to the ED and check back in". Patient does not have a ride home this evening, will be discharged first thing in am.     Review of Systems   Constitutional: Negative for chills and fatigue.   HENT: Negative for congestion and rhinorrhea.    Respiratory: Negative for cough and shortness of breath.    Cardiovascular: Positive for chest pain (burning) and leg swelling. Negative for palpitations.   Gastrointestinal: Negative for abdominal pain and diarrhea.   Genitourinary: Negative for dysuria and hematuria.   Musculoskeletal: Negative for back pain and neck pain.   Skin: Positive for rash (chronic venous stasis on BLE).   Neurological: Negative for dizziness and seizures.   Psychiatric/Behavioral: Negative for agitation and behavioral problems.     Objective:     Vital Signs (Most Recent):  Temp: 97.4 °F (36.3 °C) (05/15/19 1146)  Pulse: 75 (05/15/19 1534)  Resp: 17 (05/15/19 1146)  BP: 132/61 (05/15/19 1146)  SpO2: (!) 94 % (05/15/19 1146) Vital Signs (24h Range):  Temp:  [97.4 °F (36.3 °C)-98.2 °F (36.8 °C)] 97.4 °F (36.3 °C)  Pulse:  [66-83] 75  Resp:  [16-20] 17  SpO2:  [94 %-100 %] 94 %  BP: (132-156)/(61-85) 132/61     Weight: 121.4 kg (267 lb 10.2 oz)  Body mass index is 39.52 kg/m².    Intake/Output Summary (Last 24 hours) at 5/15/2019 2622  Last data filed at 5/15/2019 1500  Gross per 24 hour   Intake 840 ml   Output 3675 ml   Net -2835 ml      Physical Exam   Constitutional: He is oriented to person, place, and time. He appears well-developed and well-nourished.   HENT:   Head: Normocephalic and atraumatic.   Eyes: Pupils are " equal, round, and reactive to light. EOM are normal.   Neck: Normal range of motion. Neck supple.   Cardiovascular: An irregularly irregular rhythm present. Exam reveals decreased pulses (DP bilaterally).   Pulmonary/Chest: Effort normal and breath sounds normal. He has no rales.   Abdominal: Soft. Bowel sounds are normal.   Musculoskeletal: He exhibits edema and tenderness.   BLE pitting edema from feet, superior to knees with TTP and induration up to knees  Sensation intact to feet/toes and weak pulses present   Neurological: He is alert and oriented to person, place, and time.   Skin: Skin is warm and dry. Capillary refill takes less than 2 seconds. Rash noted. There is erythema.   Psychiatric: He has a normal mood and affect. His behavior is normal.   Nursing note and vitals reviewed.      Significant Labs:   CBC:   Recent Labs   Lab 05/14/19  1802 05/15/19  0353   WBC 5.54 6.70   HGB 7.9* 8.2*   HCT 25.6* 27.1*    210     CMP:   Recent Labs   Lab 05/14/19  1802 05/15/19  0353    142  141   K 4.3 4.0  3.9    104  103   CO2 26 27  27   GLU 86 126*  126*   BUN 21* 22*  21*   CREATININE 1.4 1.3  1.4   CALCIUM 9.6 9.8  10.0   PROT 8.4  --    ALBUMIN 3.6  --    BILITOT 1.2*  --    ALKPHOS 191*  --    AST 16  --    ALT 8*  --    ANIONGAP 10 11  11   EGFRNONAA 57.4* >60.0  57.4*     Coagulation:   Recent Labs   Lab 05/15/19  0353   INR 2.0*   APTT 34.4*     Magnesium:   Recent Labs   Lab 05/15/19  0353   MG 1.8     Troponin:   Recent Labs   Lab 05/14/19 1802 05/14/19  2358 05/15/19  1519   TROPONINI 0.041* 0.049* 0.056*       Significant Imaging: I have reviewed all pertinent imaging results/findings within the past 24 hours.    Assessment/Plan:      * Acute on chronic combined systolic (congestive) and diastolic (congestive) heart failure  Cardiomyopathy  Hypervolemic on exam and has been noncompliant with lasix dosing. Wt at discharge about 280 and now 267 on admission.    - c/w Lasix 80  "mg IVP BID.   Patient was overdiruesed during previous admission with lasix 80 mg IVP TID and metolazone 2.5 AM --> restarted home dose Lasix 80 PO BID  - c/w ARB. Not currently on GDMT, as BP unable to tolerate add'l agent.   - strict I/O, daily weights, tele  - cardiac diet, fluid restriction  - CXR with evidence of pulmonary edema.  Possible developing RLL consolidation, but procal negative no other infectious sxs present.  - sxs of heart failure are chronic.  Can likely be discharged in the AM as patient not requiring oxygen and has been diuresing well at home.  Will need wound care f/u.      GERD (gastroesophageal reflux disease)  Chest burning since this AM.  WIll try GI cocktail for suspected GERD vs MSK pain.  Multiple previous presentations for chest pain believed to be MSK. Chest tenderness on exam  - trop .041- elevated likely from runs of [email or web address], lower than previous admission  - EKG non-ischemic    Venous stasis of lower extremity  Significant edema and multiple bullae from heart failure. 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     Coronary artery disease  - c/w ASA  - not on statin; lipid panel shows low HDL  - will need close cardiology f/u    Noncompliance with diet and medication regimen  Recurrent problem    Essential hypertension  Controlled   - increased losartan from 12.5 to 25 mg PO daily, added low dose metoprolol     Atrial fibrillation, chronic  Chronic anticoagulation  - AC with warfarin. Hx CVA and PE.  - c/w warfarin 6 mg PO daily (INR 2.2 on admission)  - daily INR  - has recently been compliant with warfarin clinic. Per previous provider "Extensive discussion with patient on switching to DOAC, not interested in this time.  Refusing to follow up in [facility] war[facility]. " "Only wants to go to his PCP Dr. Swift, but patient had not been since October. He is a poor candidate for warfarin, but refusing to switch to DOAC."  - increased losartan from 12.5 to 25 mg  - started on low dose metoprolol, will monitor bp  - CHADSVASC 3  - monitor on tele- afib with runs of vtach- patient not interested in AICD/ life vest      VTE Risk Mitigation (From admission, onward)        Ordered     warfarin tablet 6 mg  Daily      05/14/19 2122     IP VTE HIGH RISK PATIENT  Once      05/14/19 2122              Hazel Noriega PA-C  Department of Hospital Medicine   [facility]  "

## 2019-05-16 NOTE — NURSING
CHARGE NURSE AND PRIMARY TEAM MD MADE AWARE OF PATIENT'S UNHAPPINESS WITH CARE. PATIENT STATED THAT HE WAS NOT WANTING TO SEE MD DURING ROUNDS AND IF SHE WERE TO COME INTO HIS ROOM, HE WOULD DO BODILY HARM TO HER. PRIMARY TEAM MD VERBALIZED UNDERSTANDING. DISCHARGE ORDERS NOTED AND WAS INFORMED BY PRIMARY TEAM MD TO NOTIFY SECURITY TO ESCORT PATIENT OFF OF FLOOR FOLLOWING DISCHARGE. WILL CONTINUE TO FOLLOW.

## 2019-05-16 NOTE — ASSESSMENT & PLAN NOTE
Chest burning since this AM.  WIll try GI cocktail for suspected GERD vs MSK pain.  Multiple previous presentations for chest pain believed to be MSK. Chest tenderness on exam  - trop .041- elevated likely from runs of vtach, lower than previous admission. Low concern for ACS.   - Patient given GI cocktail, mylanta which patient said made his whole body burn. However, given GI cocktail overnight with mild improvement in symptoms.   - EKG non-ischemic  - discharged with protonix daily

## 2019-05-16 NOTE — NURSING
Patient stated that he does not know what time his ride is coming for because he has not yet gotten in touch with him/her. Primary team MD made aware and stated that patient has been deemed medically stable, has been discharged and may be escorted out via security if needed. Charge nurse made aware. Verbalized understanding. Will follow up with patient.

## 2019-05-16 NOTE — ASSESSMENT & PLAN NOTE
Cardiomyopathy  Hypervolemic on exam and has been noncompliant with lasix dosing. Wt at discharge about 280 and now 267 on admission.    - c/w Lasix 80 mg IVP BID.   Patient was overdiruesed during previous admission with lasix 80 mg IVP TID and metolazone 2.5 AM --> restarted home dose Lasix 80 PO BID  - c/w ARB. Not currently on GDMT, as BP unable to tolerate add'l agent.   - strict I/O, daily weights, tele  - cardiac diet, fluid restriction  - CXR with evidence of pulmonary edema.  Possible developing RLL consolidation, but procal negative no other infectious sxs present.  - sxs of heart failure are chronic.   - discharged on home meds with addition of  low dose metoprolol

## 2019-05-16 NOTE — NURSING
"PATIENT STATED THAT HE IS NOT READY FOR DISCHARGE AND THAT HE IS GOING TO APPEAL. PRIMARY TEAM MD MADE AWARE AND STATED THAT HE IS ABLE TO APPEAL DUE TO HIS "OBSERVATION" STATUS. WILL MAKE PATIENT AWARE AND CONTINUE TO FOLLOW.   "

## 2019-05-16 NOTE — ASSESSMENT & PLAN NOTE
Cardiomyopathy  Hypervolemic on exam and has been noncompliant with lasix dosing. Wt at discharge about 280 and now 267 on admission.    - c/w Lasix 80 mg IVP BID.   Patient was overdiruesed during previous admission with lasix 80 mg IVP TID and metolazone 2.5 AM --> restarted home dose Lasix 80 PO BID  - c/w ARB. Not currently on GDMT, as BP unable to tolerate add'l agent.   - strict I/O, daily weights, tele  - cardiac diet, fluid restriction  - CXR with evidence of pulmonary edema.  Possible developing RLL consolidation, but procal negative no other infectious sxs present.  - sxs of heart failure are chronic.  Can likely be discharged in the AM as patient not requiring oxygen and has been diuresing well at home.  Will need wound care f/u.

## 2019-05-16 NOTE — ASSESSMENT & PLAN NOTE
Significant edema and multiple bullae from heart failure. C/w diuresis, leg compression, elevation  - sitting in chair on evaluation and refusing to wears compression socks  - Wound care consulted and appreciate recs  - patient non-compliant with elevating foot after extensive discussion with both provider and wound care  - unlikely to heal unless patient complies with elevation

## 2019-05-16 NOTE — PLAN OF CARE
Per nurse, the pt's ride will come after 5pm today.    Margarita Birch, [facility] x [phone]

## 2019-05-16 NOTE — ASSESSMENT & PLAN NOTE
"Chronic anticoagulation  - AC with warfarin. Hx CVA and PE.  - c/w warfarin 6 mg PO daily (INR 2.2 on admission)  - daily INR  - has recently been compliant with warfarin clinic. Per previous provider "Extensive discussion with patient on switching to DOAC, not interested in this time.  Refusing to [place] [facility] [place]. Only wants to go to his PCP Dr. Swift, but patient had not been since October. He is a poor candidate for warfarin, but refusing to switch to DOAC."  - increased losartan from 12.5 to 25 mg with raise in Cr. Held, and discharged at home dose  - started on low dose metoprolol, will monitor bp  - CHADSVASC 3  - monitor on tele- afib with runs of vtach- patient not interested in AICD/ life vest  - discharged on home dose losartan and metoprolol 25mg daily  - follow up with PCP in 1 week  "

## 2019-05-16 NOTE — PLAN OF CARE
05/16/19 1402   Post-Acute Status   Post-Acute Authorization Home Health/Hospice   Home Health/Hospice Status Set-up Complete     Pt is current with [facility]. They do not need new orders since the pt is obs.  [facility] sent them the H&P and fs.  THey will contact the pt tomorrow.  Per nurse's note, the pt will try to get a friend to pick him up.    Margarita Smith, [facility] x [phone]

## 2019-05-16 NOTE — ASSESSMENT & PLAN NOTE
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

## 2019-05-16 NOTE — NURSING
Patient stated that his ride is on the way. Charge nurse and primary team MD made aware. Verbalized understanding.

## 2019-05-17 NOTE — PLAN OF CARE
Patient discharge on yesterday to home with no needs.    Follow-up With  Details  Why  Contact Info   Carlin Swift MD  In 1 week  walk-in clinic, no appointments needed  [address]  [address]  [place] [id number] [phone]   [facility]    Home Health  [address]  [place] [address]  [phone]       [id number] - [place], [place] - [address]  [address]  [place] [address]  Phone: [phone] Fax: [phone]       05/17/19 0713   Final Note   Assessment Type Final Discharge Note   What phone number can be called within the next 1-3 days to see how you are doing after discharge?   (brother Kris)   Hospital Follow Up  Appt(s) scheduled? Yes   Discharge plans and expectations educations in teach back method with documentation complete? Yes  (per staff nurse)   Right Care Referral Info   Post Acute Recommendation No Care     Krupa Ojeda RN/BSN/CM  [facility]  [phone]  Ortho/[facility]

## 2019-05-20 ENCOUNTER — ANTI-COAG VISIT (OUTPATIENT)
Dept: CARDIOLOGY | Facility: CLINIC | Age: 54
End: 2019-05-20

## 2019-05-20 DIAGNOSIS — I48.20 ATRIAL FIBRILLATION, CHRONIC: ICD-10-CM

## 2019-05-20 DIAGNOSIS — Z79.01 CHRONIC ANTICOAGULATION: ICD-10-CM

## 2019-05-20 DIAGNOSIS — R79.1 ELEVATED INR: ICD-10-CM

## 2019-05-20 DIAGNOSIS — I50.43 ACUTE ON CHRONIC COMBINED SYSTOLIC (CONGESTIVE) AND DIASTOLIC (CONGESTIVE) HEART FAILURE: ICD-10-CM

## 2019-05-20 DIAGNOSIS — M54.50 LUMBAR BACK PAIN: ICD-10-CM

## 2019-05-20 NOTE — PROGRESS NOTES
Patient admitted 5/14/19 - 5/16/19 for CHF, per discharge AVS started on calcium carbonate (TUMS),metoprolol succinate (TOPROL-XL), pantoprazole (PROTONIX) and no change to Coumadin 6 mg tab 1 daily; calendar up to date with doses given during admit.      I instructed the patient today to call Dr Varela or an [facility] PCP to make an appointment as he needs a [facility] physician to be the responsible physician.  He states he did not take Coumadin the day he was admitted and has been taking his Coumadin at instructed since discharged home.  I spoke to Kendra at [facility] and patient was home visit is scheduled for 5/21/19 so I faxed order for INR on this visit.

## 2019-05-21 ENCOUNTER — HOSPITAL ENCOUNTER (EMERGENCY)
Facility: HOSPITAL | Age: 54
Discharge: HOME | End: 2019-05-21
Attending: EMERGENCY MEDICINE
Payer: MEDICAID

## 2019-05-21 VITALS
DIASTOLIC BLOOD PRESSURE: 59 MMHG | RESPIRATION RATE: 18 BRPM | HEIGHT: 69 IN | SYSTOLIC BLOOD PRESSURE: 102 MMHG | WEIGHT: 267 LBS | TEMPERATURE: 98 F | OXYGEN SATURATION: 96 % | BODY MASS INDEX: 39.55 KG/M2 | HEART RATE: 75 BPM

## 2019-05-21 DIAGNOSIS — L02.419 SHOULDER ABSCESS: Primary | ICD-10-CM

## 2019-05-21 PROCEDURE — 99284 PR EMERGENCY DEPT VISIT,LEVEL IV: ICD-10-PCS | Mod: 25,,, | Performed by: EMERGENCY MEDICINE

## 2019-05-21 PROCEDURE — 25000003 PHARM REV CODE 250: Performed by: EMERGENCY MEDICINE

## 2019-05-21 PROCEDURE — 10060 PR DRAIN SKIN ABSCESS SIMPLE: ICD-10-PCS | Mod: ,,, | Performed by: EMERGENCY MEDICINE

## 2019-05-21 PROCEDURE — 10060 I&D ABSCESS SIMPLE/SINGLE: CPT | Mod: ,,, | Performed by: EMERGENCY MEDICINE

## 2019-05-21 PROCEDURE — 99284 EMERGENCY DEPT VISIT MOD MDM: CPT | Mod: 25,,, | Performed by: EMERGENCY MEDICINE

## 2019-05-21 PROCEDURE — 10060 I&D ABSCESS SIMPLE/SINGLE: CPT

## 2019-05-21 PROCEDURE — 99283 EMERGENCY DEPT VISIT LOW MDM: CPT | Mod: 25

## 2019-05-21 RX ORDER — CLINDAMYCIN HYDROCHLORIDE 150 MG/1
450 CAPSULE ORAL
Status: COMPLETED | OUTPATIENT
Start: 2019-05-21 | End: 2019-05-21

## 2019-05-21 RX ORDER — LIDOCAINE HYDROCHLORIDE AND EPINEPHRINE 10; 10 MG/ML; UG/ML
1 INJECTION, SOLUTION INFILTRATION; PERINEURAL ONCE
Status: COMPLETED | OUTPATIENT
Start: 2019-05-21 | End: 2019-05-21

## 2019-05-21 RX ORDER — HYDROCODONE BITARTRATE AND ACETAMINOPHEN 5; 325 MG/1; MG/1
1 TABLET ORAL EVERY 6 HOURS PRN
Qty: 12 TABLET | Refills: 0 | Status: ON HOLD | OUTPATIENT
Start: 2019-05-21 | End: 2019-07-17 | Stop reason: HOSPADM

## 2019-05-21 RX ORDER — CLINDAMYCIN HYDROCHLORIDE 150 MG/1
450 CAPSULE ORAL EVERY 8 HOURS
Qty: 63 CAPSULE | Refills: 0 | Status: SHIPPED | OUTPATIENT
Start: 2019-05-21 | End: 2019-05-28

## 2019-05-21 RX ADMIN — LIDOCAINE HYDROCHLORIDE,EPINEPHRINE BITARTRATE 1 ML: 10; .01 INJECTION, SOLUTION INFILTRATION; PERINEURAL at 09:05

## 2019-05-21 RX ADMIN — CLINDAMYCIN HYDROCHLORIDE 450 MG: 150 CAPSULE ORAL at 10:05

## 2019-05-22 NOTE — ED TRIAGE NOTES
Hamlet Terrell, a 53 y.o. male presents to the ED w/ complaint of an abscess to the back close to the neck. Pt unsure if insect bit him or not. Pt states he first noticed it about two weeks ago. Pt denies fever. Area is swollen and red.     Triage note:  Chief Complaint   Patient presents with    Insect Bite     L upper shoulder , swollen     Review of patient's allergies indicates:   Allergen Reactions    Acetaminophen      Itching    Oxycodone-acetaminophen      Other reaction(s): Itching    Ace inhibitors Other (See Comments)     cough     Past Medical History:   Diagnosis Date    Anticoagulant long-term use     Cardiomegaly     CHF (congestive heart failure)     Chronic combined systolic and diastolic congestive heart failure     Coronary artery disease     CVA (cerebrovascular accident)     Heart attack 2006    Hypertension     Hyperthyroidism, subclinical 1/2/2013    MI (myocardial infarction) 9/22/2013    MI in 2009      Paroxysmal atrial fibrillation     PE (pulmonary embolism) 1/1/2013    IN 2010     S/P ablation of atrial flutter 2008    Stroke 2009    no residual weaknesses

## 2019-05-22 NOTE — ED PROVIDER NOTES
Encounter Date: 5/21/2019    SCRIBE #1 NOTE: I, Dayne Chen, am scribing for, and in the presence of, Dr. Hassan. Other sections scribed: HPI, ROS, PE.       History     Chief Complaint   Patient presents with    Insect Bite     L upper shoulder , swollen     Mr. Terrell is a 53 y.o. Male with  HTN, CVA, CHF, MI who presents with complaint of insect bite. Pt states he thinks something bit him on the back of his shoulder on mothers day. States  since then it has been getting bigger and more painful. Has had some drainage, bloody, but no obvious purulence.  Endorses drowsiness and dizziness since the bump came. Pt has not taken anything for the pain. Denies fever, chills, nausea, vomiting, abdominal pain, shortness of breath, or chest pain.       The history is provided by the patient.     Review of patient's allergies indicates:   Allergen Reactions    Acetaminophen      Itching    Oxycodone-acetaminophen      Other reaction(s): Itching    Ace inhibitors Other (See Comments)     cough     Past Medical History:   Diagnosis Date    Anticoagulant long-term use     Cardiomegaly     CHF (congestive heart failure)     Chronic combined systolic and diastolic congestive heart failure     Coronary artery disease     CVA (cerebrovascular accident)     Heart attack 2006    Hypertension     Hyperthyroidism, subclinical 1/2/2013    MI (myocardial infarction) 9/22/2013    MI in 2009      Paroxysmal atrial fibrillation     PE (pulmonary embolism) 1/1/2013    IN 2010     S/P ablation of atrial flutter 2008    Stroke 2009    no residual weaknesses     Past Surgical History:   Procedure Laterality Date    RADIOFREQUENCY ABLATION  01/08/2008    for atrial flutter     Family History   Problem Relation Age of Onset    Hypertension Mother     Stroke Mother     Hypertension Father     Alcohol abuse Father     Hypertension Sister     Hypertension Brother      Social History     Tobacco Use    Smoking status:  Never Smoker    Smokeless tobacco: Never Used   Substance Use Topics    Alcohol use: No    Drug use: No     Review of Systems   Constitutional: Negative for fever.   HENT: Negative for sore throat.    Respiratory: Negative for shortness of breath.    Cardiovascular: Negative for chest pain.   Gastrointestinal: Negative for nausea.   Genitourinary: Negative for dysuria.   Musculoskeletal: Negative for back pain.   Skin: Positive for wound. Negative for rash.   Allergic/Immunologic: Negative for immunocompromised state.   Neurological: Negative for weakness.   Hematological: Does not bruise/bleed easily.       Physical Exam     Initial Vitals [05/21/19 1846]   BP Pulse Resp Temp SpO2   (!) 102/59 75 18 97.7 °F (36.5 °C) 96 %      MAP       --         Physical Exam    Nursing note and vitals reviewed.  Constitutional: He appears well-developed and well-nourished. He is not diaphoretic. He appears distressed (Uncomfortable due to pain.).   HENT:   Head: Normocephalic and atraumatic.   Right Ear: External ear normal.   Left Ear: External ear normal.   Neck: Neck supple.   Cardiovascular: Normal rate, regular rhythm, normal heart sounds and intact distal pulses.   Pulmonary/Chest: Breath sounds normal. No respiratory distress. He has no wheezes. He has no rhonchi. He has no rales.   Abdominal: Soft. He exhibits no distension. There is no tenderness.   Neurological: He is alert and oriented to person, place, and time. GCS score is 15. GCS eye subscore is 4. GCS verbal subscore is 5. GCS motor subscore is 6.   Skin: Skin is warm. Capillary refill takes less than 2 seconds. No rash noted.   7.5 x 4.5 cm of induration on superior most portion of back, just left of midline.  Has significant tenderness to palpation.  No obvious area of fluctuance.  Has area of excoriation.   Psychiatric: He has a normal mood and affect.         ED Course   I & D - Incision and Drainage  Date/Time: 5/21/2019 10:06 PM  Performed by: Judson  YONAS Hassan MD  Authorized by: Judson Hassan MD   Consent Done: Not Needed  Type: abscess  Body area: trunk  Location details: back  Anesthesia: local infiltration    Anesthesia:  Local Anesthetic: lidocaine 1% with epinephrine  Scalpel size: 11  Incision type: single straight  Complexity: simple  Drainage: pus  Drainage amount: scant  Wound treatment: incision,  wound left open,  deloculation and  expression of material  Packing material: none  Patient tolerance: Patient tolerated the procedure well with no immediate complications        Labs Reviewed - No data to display       Imaging Results    None          Medical Decision Making:   History:   Old Medical Records: I decided to obtain old medical records.  Old Records Summarized: records from clinic visits and records from previous admission(s).  ED Management:  Vitals normal. Afebrile.  Here with abscess to upper back.  Bedside ultrasound with fluid collection noted as well as cobblestoning indicative of cellulitis.  I and D performed with expression of purulent material.  Given the area of associated cellulitis, will discharge on clindamycin, 1st dose given here.  Rx for few Norco for severe pain preventing sleep provided.  Stable for discharge at this time.  Return precautions discussed.            Scribe Attestation:   Scribe #1: I performed the above scribed service and the documentation accurately describes the services I performed. I attest to the accuracy of the note.               Clinical Impression:       ICD-10-CM ICD-9-CM   1. Shoulder abscess L02.419 682.3         Disposition:   Disposition: Discharged  Condition: Stable                        Judson Hassan MD  05/23/19 0097

## 2019-05-24 NOTE — PROGRESS NOTES
Patient missed his 5/21/19 INR due to going to the Emergency Department for an abscess to his shoulder and had an I&D then discharged.  He was started on Clindamycin 450 mg every 8 hours for 7 days.  He missed his HH visit while at the ED.  I faxed order to [facility] for INR on 5/27/19    UPDATE:  5/28/19  [facility] did not do a home visit on 5/27/19, I spoke to Kendra at [facility] who states she is not sure why the visit did not occur but the patient will have a home visit today.  I faxed an order for an INR on 5/28/19.    I called and instructed patient today to call [facility] to schedule an appointment to establish care with an [facility] PCP who will be the responsible physician for him to be able to be monitored by this clinic because his PCP is not an [facility] physician which he verbalized understanding.

## 2019-05-28 ENCOUNTER — ANTI-COAG VISIT (OUTPATIENT)
Dept: CARDIOLOGY | Facility: CLINIC | Age: 54
End: 2019-05-28
Payer: MEDICAID

## 2019-05-28 DIAGNOSIS — M54.50 LUMBAR BACK PAIN: ICD-10-CM

## 2019-05-28 DIAGNOSIS — I48.20 ATRIAL FIBRILLATION, CHRONIC: ICD-10-CM

## 2019-05-28 DIAGNOSIS — Z79.01 CHRONIC ANTICOAGULATION: ICD-10-CM

## 2019-05-28 DIAGNOSIS — R79.1 ELEVATED INR: ICD-10-CM

## 2019-05-28 DIAGNOSIS — I50.43 ACUTE ON CHRONIC COMBINED SYSTOLIC (CONGESTIVE) AND DIASTOLIC (CONGESTIVE) HEART FAILURE: ICD-10-CM

## 2019-05-28 LAB — INR PPP: 2.9

## 2019-05-28 PROCEDURE — 93793 PR ANTICOAGULANT MGMT FOR PT TAKING WARFARIN: ICD-10-PCS | Mod: ,,,

## 2019-05-28 PROCEDURE — 93793 ANTICOAG MGMT PT WARFARIN: CPT | Mod: ,,,

## 2019-05-28 NOTE — PROGRESS NOTES
Verbal result taken from Mary Jane Kaur/[facility]_________. PT/INR _2.9______ Date drawn_05/28/19_______ Hardcopy to be faxed.

## 2019-06-14 NOTE — PROGRESS NOTES
Mary Jane Kaur with [employer] called to inquire as to when the next INR is due, told Mary Jane Kaur was informed it was due 6/04/19, Mary Jane Kaur reports no order was received, scheduled the Patient to have next INR drawn at the next SN visit which will be 6/18/19, order was faxed to [facility]

## 2019-06-19 ENCOUNTER — ANTI-COAG VISIT (OUTPATIENT)
Dept: CARDIOLOGY | Facility: CLINIC | Age: 54
End: 2019-06-19
Payer: MEDICAID

## 2019-06-19 DIAGNOSIS — I48.20 ATRIAL FIBRILLATION, CHRONIC: ICD-10-CM

## 2019-06-19 DIAGNOSIS — Z79.01 CHRONIC ANTICOAGULATION: ICD-10-CM

## 2019-06-19 DIAGNOSIS — M54.50 LUMBAR BACK PAIN: ICD-10-CM

## 2019-06-19 DIAGNOSIS — R79.1 ELEVATED INR: ICD-10-CM

## 2019-06-19 DIAGNOSIS — I50.43 ACUTE ON CHRONIC COMBINED SYSTOLIC (CONGESTIVE) AND DIASTOLIC (CONGESTIVE) HEART FAILURE: ICD-10-CM

## 2019-06-19 LAB — INR PPP: 1.2

## 2019-06-19 PROCEDURE — 93793 ANTICOAG MGMT PT WARFARIN: CPT | Mod: ,,,

## 2019-06-19 PROCEDURE — 93793 PR ANTICOAGULANT MGMT FOR PT TAKING WARFARIN: ICD-10-PCS | Mod: ,,,

## 2019-06-20 ENCOUNTER — HOSPITAL ENCOUNTER (EMERGENCY)
Facility: HOSPITAL | Age: 54
Discharge: HOME | End: 2019-06-21
Attending: EMERGENCY MEDICINE
Payer: MEDICAID

## 2019-06-20 DIAGNOSIS — R06.00 DYSPNEA: ICD-10-CM

## 2019-06-20 DIAGNOSIS — R06.02 SOB (SHORTNESS OF BREATH): ICD-10-CM

## 2019-06-20 PROCEDURE — 93010 ELECTROCARDIOGRAM REPORT: CPT | Mod: ,,, | Performed by: INTERNAL MEDICINE

## 2019-06-20 PROCEDURE — 93005 ELECTROCARDIOGRAM TRACING: CPT

## 2019-06-20 PROCEDURE — 93010 EKG 12-LEAD: ICD-10-PCS | Mod: ,,, | Performed by: INTERNAL MEDICINE

## 2019-06-20 PROCEDURE — 99285 EMERGENCY DEPT VISIT HI MDM: CPT | Mod: 25

## 2019-06-20 PROCEDURE — 99285 EMERGENCY DEPT VISIT HI MDM: CPT | Mod: ,,, | Performed by: EMERGENCY MEDICINE

## 2019-06-20 PROCEDURE — 99285 PR EMERGENCY DEPT VISIT,LEVEL V: ICD-10-PCS | Mod: ,,, | Performed by: EMERGENCY MEDICINE

## 2019-06-21 VITALS
TEMPERATURE: 98 F | SYSTOLIC BLOOD PRESSURE: 127 MMHG | RESPIRATION RATE: 18 BRPM | DIASTOLIC BLOOD PRESSURE: 77 MMHG | WEIGHT: 262.13 LBS | HEIGHT: 69 IN | OXYGEN SATURATION: 96 % | BODY MASS INDEX: 38.82 KG/M2 | HEART RATE: 84 BPM

## 2019-06-21 LAB
ALBUMIN SERPL BCP-MCNC: 3.5 G/DL (ref 3.5–5.2)
ALP SERPL-CCNC: 205 U/L (ref 55–135)
ALT SERPL W/O P-5'-P-CCNC: 6 U/L (ref 10–44)
ANION GAP SERPL CALC-SCNC: 13 MMOL/L (ref 8–16)
AST SERPL-CCNC: 15 U/L (ref 10–40)
BASOPHILS # BLD AUTO: 0.05 K/UL (ref 0–0.2)
BASOPHILS NFR BLD: 0.9 % (ref 0–1.9)
BILIRUB SERPL-MCNC: 2 MG/DL (ref 0.1–1)
BNP SERPL-MCNC: 464 PG/ML (ref 0–99)
BUN SERPL-MCNC: 24 MG/DL (ref 6–20)
CALCIUM SERPL-MCNC: 9.9 MG/DL (ref 8.7–10.5)
CHLORIDE SERPL-SCNC: 103 MMOL/L (ref 95–110)
CO2 SERPL-SCNC: 25 MMOL/L (ref 23–29)
CREAT SERPL-MCNC: 1.4 MG/DL (ref 0.5–1.4)
DIFFERENTIAL METHOD: ABNORMAL
EOSINOPHIL # BLD AUTO: 0.3 K/UL (ref 0–0.5)
EOSINOPHIL NFR BLD: 4.8 % (ref 0–8)
ERYTHROCYTE [DISTWIDTH] IN BLOOD BY AUTOMATED COUNT: 16.1 % (ref 11.5–14.5)
EST. GFR  (AFRICAN AMERICAN): >60 ML/MIN/1.73 M^2
EST. GFR  (NON AFRICAN AMERICAN): 57.4 ML/MIN/1.73 M^2
GLUCOSE SERPL-MCNC: 84 MG/DL (ref 70–110)
HCT VFR BLD AUTO: 27.8 % (ref 40–54)
HGB BLD-MCNC: 8.3 G/DL (ref 14–18)
IMM GRANULOCYTES # BLD AUTO: 0.01 K/UL (ref 0–0.04)
IMM GRANULOCYTES NFR BLD AUTO: 0.2 % (ref 0–0.5)
LYMPHOCYTES # BLD AUTO: 1.1 K/UL (ref 1–4.8)
LYMPHOCYTES NFR BLD: 21.2 % (ref 18–48)
MCH RBC QN AUTO: 24.8 PG (ref 27–31)
MCHC RBC AUTO-ENTMCNC: 29.9 G/DL (ref 32–36)
MCV RBC AUTO: 83 FL (ref 82–98)
MONOCYTES # BLD AUTO: 1.1 K/UL (ref 0.3–1)
MONOCYTES NFR BLD: 20.3 % (ref 4–15)
NEUTROPHILS # BLD AUTO: 2.8 K/UL (ref 1.8–7.7)
NEUTROPHILS NFR BLD: 52.6 % (ref 38–73)
NRBC BLD-RTO: 0 /100 WBC
PLATELET # BLD AUTO: 240 K/UL (ref 150–350)
PMV BLD AUTO: 10 FL (ref 9.2–12.9)
POTASSIUM SERPL-SCNC: 3.7 MMOL/L (ref 3.5–5.1)
PROT SERPL-MCNC: 9 G/DL (ref 6–8.4)
RBC # BLD AUTO: 3.35 M/UL (ref 4.6–6.2)
SODIUM SERPL-SCNC: 141 MMOL/L (ref 136–145)
TROPONIN I SERPL DL<=0.01 NG/ML-MCNC: 0.05 NG/ML (ref 0–0.03)
WBC # BLD AUTO: 5.38 K/UL (ref 3.9–12.7)

## 2019-06-21 PROCEDURE — 83880 ASSAY OF NATRIURETIC PEPTIDE: CPT

## 2019-06-21 PROCEDURE — 63600175 PHARM REV CODE 636 W HCPCS: Performed by: EMERGENCY MEDICINE

## 2019-06-21 PROCEDURE — 80053 COMPREHEN METABOLIC PANEL: CPT

## 2019-06-21 PROCEDURE — 84484 ASSAY OF TROPONIN QUANT: CPT

## 2019-06-21 PROCEDURE — 96374 THER/PROPH/DIAG INJ IV PUSH: CPT

## 2019-06-21 PROCEDURE — 85025 COMPLETE CBC W/AUTO DIFF WBC: CPT

## 2019-06-21 RX ORDER — FUROSEMIDE 10 MG/ML
80 INJECTION INTRAMUSCULAR; INTRAVENOUS
Status: COMPLETED | OUTPATIENT
Start: 2019-06-21 | End: 2019-06-21

## 2019-06-21 RX ADMIN — FUROSEMIDE 80 MG: 10 INJECTION, SOLUTION INTRAVENOUS at 02:06

## 2019-06-21 NOTE — ED PROVIDER NOTES
"Encounter Date: 6/20/2019    SCRIBE #1 NOTE: I, Hung Lenz, am scribing for, and in the presence of,  Cristofer Rosario M.D. I have scribed the entire note.       History     Chief Complaint   Patient presents with    Shortness of Breath     chronic leg swelling, leg blisters, abdominal swelling, denies cp. SOB "since I been born."     The pt is a 54 yo M with PMHx of cardiomegaly, anticoagulant long-term use, CHF, chronic combined systolic and diastolic congestive heart failure, coronary artery dz, CVA, heart attack, HTN, hyperthyroidism, MI, paroxysmal atrial fibrillation, PE, stroke, and S/P ablation of atrial flutter presents to the ED with a complaint of shortness of breath. States that he has been feeling SOB, and began to notice extra fluid and blisters to his legs on Mother's day. States that fluid on legs and blisters have continuously worsened since then. Is normally SOB, but is worse now. Takes 80 mg of lasix BID. Blisters on legs are extremely painful, and regularly burst causing them to spread. Denies any other complaints at this time.     The history is provided by the patient.     Review of patient's allergies indicates:   Allergen Reactions    Acetaminophen      Itching    Oxycodone-acetaminophen      Other reaction(s): Itching    Ace inhibitors Other (See Comments)     cough     Past Medical History:   Diagnosis Date    Anticoagulant long-term use     Cardiomegaly     CHF (congestive heart failure)     Chronic combined systolic and diastolic congestive heart failure     Coronary artery disease     CVA (cerebrovascular accident)     Heart attack 2006    Hypertension     Hyperthyroidism, subclinical 1/2/2013    MI (myocardial infarction) 9/22/2013    MI in 2009      Paroxysmal atrial fibrillation     PE (pulmonary embolism) 1/1/2013    IN 2010     S/P ablation of atrial flutter 2008    Stroke 2009    no residual weaknesses     Past Surgical History:   Procedure Laterality Date    " RADIOFREQUENCY ABLATION  01/08/2008    for atrial flutter     Family History   Problem Relation Age of Onset    Hypertension Mother     Stroke Mother     Hypertension Father     Alcohol abuse Father     Hypertension Sister     Hypertension Brother      Social History     Tobacco Use    Smoking status: Never Smoker    Smokeless tobacco: Never Used   Substance Use Topics    Alcohol use: No    Drug use: No     Review of Systems  General: No fever.  No chills.  Eyes: No visual changes.  Head: No headache.    Integument: Blisters to legs.  Chest: Shortness of breath.  Cardiovascular: No chest pain. Leg swelling.   Abdomen: No abdominal pain.  No nausea or vomiting.  Urinary: No abnormal urination.  Neurologic: No focal weakness.  No numbness.  Hematologic: No easy bruising.  Endocrine: No excessive thirst or urination.    Physical Exam     Initial Vitals [06/20/19 2035]   BP Pulse Resp Temp SpO2   132/67 70 18 97.6 °F (36.4 °C) 99 %      MAP       --         Physical Exam    Nursing note and vitals reviewed.      Appearance: No acute distress.  Skin: No rashes seen.  Good turgor.  No abrasions.  No ecchymoses.  Eyes: No conjunctival injection.  ENT: Oropharynx clear.    Chest: Clear to auscultation bilaterally.  Good air movement.  No wheezes.  No rhonchi.  Cardiovascular: Regular rate and rhythm.  No murmurs. No gallops. No rubs.  Abdomen: Soft.  Not distended.  Nontender.  No guarding.  No rebound. No Masses  Musculoskeletal: 2+ pitting edema with chronic vascular changes up to mid thigh.   Neurologic: Equal strength in upper and lower extremities bilaterally.  Normal sensation.  No facial droop.  Normal speech.    Mental Status:  Alert and oriented x 3.  Appropriate, conversant.    ED Course   Procedures  Labs Reviewed - No data to display       Imaging Results    None          Medical Decision Making:   Clinical Tests:   Lab Tests: Ordered and Reviewed  Radiological Study: Ordered and Reviewed  Medical  Tests: Ordered and Reviewed  ED Management:  Pt here complaining of chronic foot swelling and dypsnea. Vitals stable. BNP and troponin consistent with previous. CXR on my interpretation shows no worsening of edema and no pneumonia. Anemia stable. Pt will be discharged home after a dose of lasix here. Advised outpatient followup. Advised pt to follow up with PCP or return if concerning symptoms arise. Pt understands and agrees with plan. Will d/c home.                Scribe Attestation:   Scribe #1: I performed the above scribed service and the documentation accurately describes the services I performed. I attest to the accuracy of the note.               Clinical Impression:       ICD-10-CM ICD-9-CM   1. SOB (shortness of breath) R06.02 786.05   2. Dyspnea R06.00 786.09         Disposition:   Disposition: Discharged  Condition: Stable                        Cristofer Rosario MD  06/21/19 2947

## 2019-06-21 NOTE — PROGRESS NOTES
INR at baseline and pt denies changes. I doubt no changes. Boost dose today then resume current dose. Recheck INR in 1 week

## 2019-06-21 NOTE — ED TRIAGE NOTES
"Hamlet Terrell, a 53 y.o. male presents to the ED w/ complaint of leg blister, BLE swelling, neck pain (previous I&D removal), generalized abdominal pain, and sob - "since I was born). Pt is also on 1L of oxygen via nasal cannula, but is not taking oxygen at this time due to his "machine broken". Pt never got his oxygen situation settled because he didn't call his insurance. Pt at this time denies chest pain, n/v/d, or fever/chills.    Triage note:  Chief Complaint   Patient presents with    Shortness of Breath     chronic leg swelling, leg blisters, abdominal swelling, denies cp. SOB "since I been born."     Review of patient's allergies indicates:   Allergen Reactions    Acetaminophen      Itching    Oxycodone-acetaminophen      Other reaction(s): Itching    Ace inhibitors Other (See Comments)     cough     Past Medical History:   Diagnosis Date    Anticoagulant long-term use     Cardiomegaly     CHF (congestive heart failure)     Chronic combined systolic and diastolic congestive heart failure     Coronary artery disease     CVA (cerebrovascular accident)     Heart attack 2006    Hypertension     Hyperthyroidism, subclinical 1/2/2013    MI (myocardial infarction) 9/22/2013    MI in 2009      Paroxysmal atrial fibrillation     PE (pulmonary embolism) 1/1/2013    IN 2010     S/P ablation of atrial flutter 2008    Stroke 2009    no residual weaknesses     Adult Physical Assessment  LOC: Hamlet Terrell, 53 y.o. male verified via two identifiers.  The patient is awake, alert, oriented and speaking appropriately at this time.  APPEARANCE: Patient resting comfortably and appears to be in no acute distress at this time. Patient is clean and well groomed, patient's clothing is properly fastened.  SKIN:The skin is warm and dry, color consistent with ethnicity, patient has normal skin turgor and moist mucus membranes, skin intact, no breakdown or brusing noted.  MUSCULOSKELETAL: Patient moving all " extremities well, no obvious swelling or deformities noted.  RESPIRATORY: Airway is open and patent, respirations are spontaneous, patient has a normal effort and rate, no accessory muscle use noted. SOB (born with it) - ran out of O2 at home.  CARDIAC: Patient has a normal rate and rhythm. BLE swelling  ABDOMEN: Soft and non tender to palpation, no abdominal distention noted. Bowel sounds present in all four quadrants.  NEUROLOGIC: Eyes open spontaneously, behavior appropriate to situation, follows commands, facial expression symmetrical, bilateral hand grasp equal and even, purposeful motor response noted, normal sensation in all extremities when touched with a finger.  GI: Generalized abdominal pain

## 2019-06-24 NOTE — PROGRESS NOTES
Left message at Patient's # to call coumadin clinic, also left message with Kris to have patient call coumadin clinic, Needs to be advised

## 2019-06-25 NOTE — PROGRESS NOTES
Carol with [employer] (ph [phone]) called to see what can be done for the patient to become more compliant with clinic health versus using the Emergency Department.  I explained the patient is current with plan of care for monitoring at this point but he does have home health and once that is discontinued his transportation issue would be something that could prevent him from being compliant.  I asked if she would see if he is eligible with [employer] to get an INR home meter because Medicaid does not approve them.  She will contact me back to see if they will approve a home INR meter.  I did put [facility]'s name on waiting list for home meter at this time.

## 2019-06-26 ENCOUNTER — ANTI-COAG VISIT (OUTPATIENT)
Dept: CARDIOLOGY | Facility: CLINIC | Age: 54
End: 2019-06-26

## 2019-06-26 DIAGNOSIS — M54.50 LUMBAR BACK PAIN: ICD-10-CM

## 2019-06-26 DIAGNOSIS — I50.43 ACUTE ON CHRONIC COMBINED SYSTOLIC (CONGESTIVE) AND DIASTOLIC (CONGESTIVE) HEART FAILURE: ICD-10-CM

## 2019-06-26 DIAGNOSIS — I48.20 ATRIAL FIBRILLATION, CHRONIC: ICD-10-CM

## 2019-06-26 DIAGNOSIS — Z79.01 CHRONIC ANTICOAGULATION: ICD-10-CM

## 2019-06-26 DIAGNOSIS — R79.1 ELEVATED INR: ICD-10-CM

## 2019-06-26 LAB — INR PPP: 1.2

## 2019-07-01 NOTE — PROGRESS NOTES
Pt never reached for questioning for INR last week.  We will continue to attempt to reach him, but are closing the chart for administrative purposes.

## 2019-07-02 ENCOUNTER — HOSPITAL ENCOUNTER (INPATIENT)
Facility: HOSPITAL | Age: 54
LOS: 16 days | Discharge: NURSING, REHAB OR LONG-TERM CARE | DRG: 291 | End: 2019-07-18
Attending: EMERGENCY MEDICINE | Admitting: EMERGENCY MEDICINE
Payer: MEDICAID

## 2019-07-02 DIAGNOSIS — M79.672 HEEL PAIN, BILATERAL: Chronic | ICD-10-CM

## 2019-07-02 DIAGNOSIS — H53.2 MONOCULAR DIPLOPIA OF BOTH EYES: ICD-10-CM

## 2019-07-02 DIAGNOSIS — D64.9 NORMOCYTIC ANEMIA: ICD-10-CM

## 2019-07-02 DIAGNOSIS — H53.8 BLURRY VISION, BILATERAL: ICD-10-CM

## 2019-07-02 DIAGNOSIS — I25.2 PERSONAL HISTORY OF MI (MYOCARDIAL INFARCTION): ICD-10-CM

## 2019-07-02 DIAGNOSIS — R50.9 FEBRILE ILLNESS, ACUTE: ICD-10-CM

## 2019-07-02 DIAGNOSIS — I83.009 VENOUS STASIS ULCER, UNSPECIFIED SITE, UNSPECIFIED ULCER STAGE, UNSPECIFIED WHETHER VARICOSE VEINS PRESENT: ICD-10-CM

## 2019-07-02 DIAGNOSIS — R07.82 INTERCOSTAL PAIN: ICD-10-CM

## 2019-07-02 DIAGNOSIS — K21.9 GASTROESOPHAGEAL REFLUX DISEASE, ESOPHAGITIS PRESENCE NOT SPECIFIED: ICD-10-CM

## 2019-07-02 DIAGNOSIS — R19.5 FECAL OCCULT BLOOD TEST POSITIVE: ICD-10-CM

## 2019-07-02 DIAGNOSIS — R04.0 EPISTAXIS: ICD-10-CM

## 2019-07-02 DIAGNOSIS — D41.00 NEOPLASM OF UNCERTAIN BEHAVIOR OF KIDNEY AND URETER, UNSPECIFIED LATERALITY: ICD-10-CM

## 2019-07-02 DIAGNOSIS — I47.20 V-TACH: ICD-10-CM

## 2019-07-02 DIAGNOSIS — I47.20 V TACH: ICD-10-CM

## 2019-07-02 DIAGNOSIS — Z79.01 CHRONIC ANTICOAGULATION: Chronic | ICD-10-CM

## 2019-07-02 DIAGNOSIS — I49.9 ABNORMAL HEART RHYTHM: ICD-10-CM

## 2019-07-02 DIAGNOSIS — N17.9 AKI (ACUTE KIDNEY INJURY): ICD-10-CM

## 2019-07-02 DIAGNOSIS — R74.8 ELEVATED ALKALINE PHOSPHATASE LEVEL: ICD-10-CM

## 2019-07-02 DIAGNOSIS — G47.33 OSA (OBSTRUCTIVE SLEEP APNEA): ICD-10-CM

## 2019-07-02 DIAGNOSIS — Z86.79 PERSONAL HISTORY OF CEREBRAL EMBOLISM: ICD-10-CM

## 2019-07-02 DIAGNOSIS — M25.519 SHOULDER PAIN, UNSPECIFIED CHRONICITY, UNSPECIFIED LATERALITY: ICD-10-CM

## 2019-07-02 DIAGNOSIS — M25.519 SHOULDER PAIN: ICD-10-CM

## 2019-07-02 DIAGNOSIS — R29.898 WEAKNESS OF LOWER EXTREMITY, UNSPECIFIED LATERALITY: Primary | ICD-10-CM

## 2019-07-02 DIAGNOSIS — M79.671 HEEL PAIN, BILATERAL: Chronic | ICD-10-CM

## 2019-07-02 DIAGNOSIS — Z91.148 NONCOMPLIANCE WITH DIET AND MEDICATION REGIMEN: ICD-10-CM

## 2019-07-02 DIAGNOSIS — M25.511 ACUTE PAIN OF RIGHT SHOULDER DUE TO TRAUMA: ICD-10-CM

## 2019-07-02 DIAGNOSIS — R55 SYNCOPE, TUSSIVE: ICD-10-CM

## 2019-07-02 DIAGNOSIS — R05.4 SYNCOPE, TUSSIVE: ICD-10-CM

## 2019-07-02 DIAGNOSIS — I50.43 ACUTE ON CHRONIC COMBINED SYSTOLIC AND DIASTOLIC HEART FAILURE: ICD-10-CM

## 2019-07-02 DIAGNOSIS — I48.20 ATRIAL FIBRILLATION, CHRONIC: Chronic | ICD-10-CM

## 2019-07-02 DIAGNOSIS — Z91.199 NONCOMPLIANCE WITH DIET AND MEDICATION REGIMEN: ICD-10-CM

## 2019-07-02 DIAGNOSIS — Z86.718 PERSONAL HISTORY OF VENOUS THROMBOSIS AND EMBOLISM: ICD-10-CM

## 2019-07-02 DIAGNOSIS — I87.8 VENOUS STASIS OF LOWER EXTREMITY: ICD-10-CM

## 2019-07-02 DIAGNOSIS — S69.91XS INJURY OF RIGHT RING FINGER, SEQUELA: ICD-10-CM

## 2019-07-02 DIAGNOSIS — I25.10 CORONARY ARTERY DISEASE, ANGINA PRESENCE UNSPECIFIED, UNSPECIFIED VESSEL OR LESION TYPE, UNSPECIFIED WHETHER NATIVE OR TRANSPLANTED HEART: Chronic | ICD-10-CM

## 2019-07-02 DIAGNOSIS — G57.93 NEUROPATHY OF BOTH FEET: ICD-10-CM

## 2019-07-02 DIAGNOSIS — I10 ESSENTIAL HYPERTENSION: Chronic | ICD-10-CM

## 2019-07-02 DIAGNOSIS — R07.89 ATYPICAL CHEST PAIN: ICD-10-CM

## 2019-07-02 DIAGNOSIS — M79.89 LEG SWELLING: ICD-10-CM

## 2019-07-02 DIAGNOSIS — R73.03 PREDIABETES: ICD-10-CM

## 2019-07-02 DIAGNOSIS — N18.30 STAGE 3 CHRONIC KIDNEY DISEASE: Chronic | ICD-10-CM

## 2019-07-02 DIAGNOSIS — R13.10 DYSPHAGIA, IDIOPATHIC: ICD-10-CM

## 2019-07-02 DIAGNOSIS — R79.1 ELEVATED INR: ICD-10-CM

## 2019-07-02 DIAGNOSIS — I49.9 ARRHYTHMIA: ICD-10-CM

## 2019-07-02 DIAGNOSIS — R09.82 POST-NASAL DRIP: ICD-10-CM

## 2019-07-02 DIAGNOSIS — E05.90 HYPERTHYROIDISM, SUBCLINICAL: ICD-10-CM

## 2019-07-02 DIAGNOSIS — I50.43 ACUTE ON CHRONIC COMBINED SYSTOLIC (CONGESTIVE) AND DIASTOLIC (CONGESTIVE) HEART FAILURE: ICD-10-CM

## 2019-07-02 DIAGNOSIS — R60.9 EDEMA, UNSPECIFIED TYPE: ICD-10-CM

## 2019-07-02 DIAGNOSIS — M54.50 LUMBAR BACK PAIN: ICD-10-CM

## 2019-07-02 DIAGNOSIS — H50.10 EXOTROPIA: ICD-10-CM

## 2019-07-02 DIAGNOSIS — Z74.09 IMPAIRED MOBILITY: ICD-10-CM

## 2019-07-02 DIAGNOSIS — M10.041 ACUTE IDIOPATHIC GOUT OF RIGHT HAND: ICD-10-CM

## 2019-07-02 DIAGNOSIS — Z91.148 NON COMPLIANCE W MEDICATION REGIMEN: Chronic | ICD-10-CM

## 2019-07-02 DIAGNOSIS — G89.11 ACUTE PAIN OF RIGHT SHOULDER DUE TO TRAUMA: ICD-10-CM

## 2019-07-02 DIAGNOSIS — R07.9 CHEST PAIN: ICD-10-CM

## 2019-07-02 DIAGNOSIS — Z86.73 HISTORY OF CVA (CEREBROVASCULAR ACCIDENT): ICD-10-CM

## 2019-07-02 DIAGNOSIS — L97.909 VENOUS STASIS ULCER, UNSPECIFIED SITE, UNSPECIFIED ULCER STAGE, UNSPECIFIED WHETHER VARICOSE VEINS PRESENT: ICD-10-CM

## 2019-07-02 DIAGNOSIS — D41.20 NEOPLASM OF UNCERTAIN BEHAVIOR OF KIDNEY AND URETER, UNSPECIFIED LATERALITY: ICD-10-CM

## 2019-07-02 DIAGNOSIS — M25.572 ACUTE LEFT ANKLE PAIN: ICD-10-CM

## 2019-07-02 DIAGNOSIS — I42.0 DILATED CARDIOMYOPATHY: Chronic | ICD-10-CM

## 2019-07-02 DIAGNOSIS — E87.6 HYPOKALEMIA: ICD-10-CM

## 2019-07-02 LAB
ALBUMIN SERPL BCP-MCNC: 3.6 G/DL (ref 3.5–5.2)
ALP SERPL-CCNC: 236 U/L (ref 55–135)
ALT SERPL W/O P-5'-P-CCNC: 9 U/L (ref 10–44)
ANION GAP SERPL CALC-SCNC: 14 MMOL/L (ref 8–16)
AST SERPL-CCNC: 18 U/L (ref 10–40)
BASOPHILS # BLD AUTO: 0.06 K/UL (ref 0–0.2)
BASOPHILS NFR BLD: 0.8 % (ref 0–1.9)
BILIRUB SERPL-MCNC: 1.8 MG/DL (ref 0.1–1)
BUN SERPL-MCNC: 14 MG/DL (ref 6–20)
CALCIUM SERPL-MCNC: 9.9 MG/DL (ref 8.7–10.5)
CHLORIDE SERPL-SCNC: 100 MMOL/L (ref 95–110)
CO2 SERPL-SCNC: 24 MMOL/L (ref 23–29)
CREAT SERPL-MCNC: 1.3 MG/DL (ref 0.5–1.4)
DIFFERENTIAL METHOD: ABNORMAL
EOSINOPHIL # BLD AUTO: 0.3 K/UL (ref 0–0.5)
EOSINOPHIL NFR BLD: 3.8 % (ref 0–8)
ERYTHROCYTE [DISTWIDTH] IN BLOOD BY AUTOMATED COUNT: 15.9 % (ref 11.5–14.5)
EST. GFR  (AFRICAN AMERICAN): >60 ML/MIN/1.73 M^2
EST. GFR  (NON AFRICAN AMERICAN): >60 ML/MIN/1.73 M^2
GLUCOSE SERPL-MCNC: 87 MG/DL (ref 70–110)
HCT VFR BLD AUTO: 29.5 % (ref 40–54)
HGB BLD-MCNC: 8.7 G/DL (ref 14–18)
IMM GRANULOCYTES # BLD AUTO: 0.04 K/UL (ref 0–0.04)
IMM GRANULOCYTES NFR BLD AUTO: 0.5 % (ref 0–0.5)
INR PPP: 1.2 (ref 0.8–1.2)
LYMPHOCYTES # BLD AUTO: 1.4 K/UL (ref 1–4.8)
LYMPHOCYTES NFR BLD: 18.4 % (ref 18–48)
MCH RBC QN AUTO: 24.1 PG (ref 27–31)
MCHC RBC AUTO-ENTMCNC: 29.5 G/DL (ref 32–36)
MCV RBC AUTO: 82 FL (ref 82–98)
MONOCYTES # BLD AUTO: 1.4 K/UL (ref 0.3–1)
MONOCYTES NFR BLD: 18.6 % (ref 4–15)
NEUTROPHILS # BLD AUTO: 4.4 K/UL (ref 1.8–7.7)
NEUTROPHILS NFR BLD: 57.9 % (ref 38–73)
NRBC BLD-RTO: 0 /100 WBC
PLATELET # BLD AUTO: 295 K/UL (ref 150–350)
PMV BLD AUTO: 10.7 FL (ref 9.2–12.9)
POTASSIUM SERPL-SCNC: 4 MMOL/L (ref 3.5–5.1)
PROT SERPL-MCNC: 9.2 G/DL (ref 6–8.4)
PROTHROMBIN TIME: 12.2 SEC (ref 9–12.5)
RBC # BLD AUTO: 3.61 M/UL (ref 4.6–6.2)
SODIUM SERPL-SCNC: 138 MMOL/L (ref 136–145)
WBC # BLD AUTO: 7.62 K/UL (ref 3.9–12.7)

## 2019-07-02 PROCEDURE — 12000002 HC ACUTE/MED SURGE SEMI-PRIVATE ROOM

## 2019-07-02 PROCEDURE — 83735 ASSAY OF MAGNESIUM: CPT

## 2019-07-02 PROCEDURE — 99285 PR EMERGENCY DEPT VISIT,LEVEL V: ICD-10-PCS | Mod: ,,, | Performed by: EMERGENCY MEDICINE

## 2019-07-02 PROCEDURE — 96374 THER/PROPH/DIAG INJ IV PUSH: CPT

## 2019-07-02 PROCEDURE — 83036 HEMOGLOBIN GLYCOSYLATED A1C: CPT

## 2019-07-02 PROCEDURE — 85025 COMPLETE CBC W/AUTO DIFF WBC: CPT

## 2019-07-02 PROCEDURE — 85652 RBC SED RATE AUTOMATED: CPT

## 2019-07-02 PROCEDURE — 63600175 PHARM REV CODE 636 W HCPCS: Performed by: EMERGENCY MEDICINE

## 2019-07-02 PROCEDURE — 99285 EMERGENCY DEPT VISIT HI MDM: CPT | Mod: 25

## 2019-07-02 PROCEDURE — 84443 ASSAY THYROID STIM HORMONE: CPT

## 2019-07-02 PROCEDURE — 86140 C-REACTIVE PROTEIN: CPT

## 2019-07-02 PROCEDURE — 80053 COMPREHEN METABOLIC PANEL: CPT

## 2019-07-02 PROCEDURE — 96376 TX/PRO/DX INJ SAME DRUG ADON: CPT

## 2019-07-02 PROCEDURE — 99285 EMERGENCY DEPT VISIT HI MDM: CPT | Mod: ,,, | Performed by: EMERGENCY MEDICINE

## 2019-07-02 PROCEDURE — 85610 PROTHROMBIN TIME: CPT

## 2019-07-02 RX ORDER — MORPHINE SULFATE 4 MG/ML
4 INJECTION, SOLUTION INTRAMUSCULAR; INTRAVENOUS
Status: COMPLETED | OUTPATIENT
Start: 2019-07-02 | End: 2019-07-02

## 2019-07-02 RX ORDER — IBUPROFEN 400 MG/1
400 TABLET ORAL
Status: DISCONTINUED | OUTPATIENT
Start: 2019-07-02 | End: 2019-07-02

## 2019-07-02 RX ORDER — MORPHINE SULFATE 2 MG/ML
2 INJECTION, SOLUTION INTRAMUSCULAR; INTRAVENOUS
Status: COMPLETED | OUTPATIENT
Start: 2019-07-02 | End: 2019-07-02

## 2019-07-02 RX ADMIN — MORPHINE SULFATE 2 MG: 2 INJECTION, SOLUTION INTRAMUSCULAR; INTRAVENOUS at 10:07

## 2019-07-02 RX ADMIN — MORPHINE SULFATE 4 MG: 4 INJECTION INTRAVENOUS at 11:07

## 2019-07-03 PROBLEM — G89.11 ACUTE PAIN OF RIGHT SHOULDER DUE TO TRAUMA: Status: ACTIVE | Noted: 2019-07-03

## 2019-07-03 PROBLEM — R73.03 PREDIABETES: Status: ACTIVE | Noted: 2019-07-03

## 2019-07-03 PROBLEM — M25.511 ACUTE PAIN OF RIGHT SHOULDER DUE TO TRAUMA: Status: ACTIVE | Noted: 2019-07-03

## 2019-07-03 PROBLEM — R29.898 WEAKNESS OF LOWER EXTREMITY: Status: ACTIVE | Noted: 2019-07-03

## 2019-07-03 PROBLEM — G57.93 NEUROPATHY OF BOTH FEET: Status: ACTIVE | Noted: 2019-07-03

## 2019-07-03 LAB
AMPHET+METHAMPHET UR QL: NEGATIVE
BARBITURATES UR QL SCN>200 NG/ML: NEGATIVE
BENZODIAZ UR QL SCN>200 NG/ML: NORMAL
BILIRUB UR QL STRIP: NEGATIVE
BNP SERPL-MCNC: 554 PG/ML (ref 0–99)
BZE UR QL SCN: NEGATIVE
CANNABINOIDS UR QL SCN: NEGATIVE
CLARITY UR REFRACT.AUTO: CLEAR
COLOR UR AUTO: YELLOW
CREAT UR-MCNC: 234 MG/DL (ref 23–375)
CRP SERPL-MCNC: 25.9 MG/L (ref 0–8.2)
ERYTHROCYTE [SEDIMENTATION RATE] IN BLOOD BY WESTERGREN METHOD: >120 MM/HR (ref 0–23)
ESTIMATED AVG GLUCOSE: 126 MG/DL (ref 68–131)
ETHANOL UR-MCNC: <10 MG/DL
GLUCOSE UR QL STRIP: NEGATIVE
HBA1C MFR BLD HPLC: 6 % (ref 4–5.6)
HGB UR QL STRIP: NEGATIVE
INR PPP: 1.2 (ref 0.8–1.2)
KETONES UR QL STRIP: NEGATIVE
LEUKOCYTE ESTERASE UR QL STRIP: NEGATIVE
MAGNESIUM SERPL-MCNC: 1.9 MG/DL (ref 1.6–2.6)
METHADONE UR QL SCN>300 NG/ML: NEGATIVE
NITRITE UR QL STRIP: NEGATIVE
OPIATES UR QL SCN: NORMAL
PCP UR QL SCN>25 NG/ML: NEGATIVE
PH UR STRIP: 5 [PH] (ref 5–8)
PROT UR QL STRIP: NEGATIVE
PROTHROMBIN TIME: 12.1 SEC (ref 9–12.5)
SP GR UR STRIP: 1.01 (ref 1–1.03)
TOXICOLOGY INFORMATION: NORMAL
TROPONIN I SERPL DL<=0.01 NG/ML-MCNC: 0.09 NG/ML (ref 0–0.03)
TROPONIN I SERPL DL<=0.01 NG/ML-MCNC: 0.11 NG/ML (ref 0–0.03)
TROPONIN I SERPL DL<=0.01 NG/ML-MCNC: 0.12 NG/ML (ref 0–0.03)
TSH SERPL DL<=0.005 MIU/L-ACNC: 1.54 UIU/ML (ref 0.4–4)
URN SPEC COLLECT METH UR: NORMAL

## 2019-07-03 PROCEDURE — 63600175 PHARM REV CODE 636 W HCPCS: Performed by: HOSPITALIST

## 2019-07-03 PROCEDURE — 93010 EKG 12-LEAD: ICD-10-PCS | Mod: ,,, | Performed by: INTERNAL MEDICINE

## 2019-07-03 PROCEDURE — 85610 PROTHROMBIN TIME: CPT

## 2019-07-03 PROCEDURE — 93010 ELECTROCARDIOGRAM REPORT: CPT | Mod: ,,, | Performed by: INTERNAL MEDICINE

## 2019-07-03 PROCEDURE — 93010 ELECTROCARDIOGRAM REPORT: CPT | Mod: 76,,, | Performed by: INTERNAL MEDICINE

## 2019-07-03 PROCEDURE — 96376 TX/PRO/DX INJ SAME DRUG ADON: CPT

## 2019-07-03 PROCEDURE — 96372 THER/PROPH/DIAG INJ SC/IM: CPT

## 2019-07-03 PROCEDURE — 81003 URINALYSIS AUTO W/O SCOPE: CPT

## 2019-07-03 PROCEDURE — 96375 TX/PRO/DX INJ NEW DRUG ADDON: CPT

## 2019-07-03 PROCEDURE — 36415 COLL VENOUS BLD VENIPUNCTURE: CPT

## 2019-07-03 PROCEDURE — 63600175 PHARM REV CODE 636 W HCPCS: Performed by: PHYSICIAN ASSISTANT

## 2019-07-03 PROCEDURE — 11000001 HC ACUTE MED/SURG PRIVATE ROOM

## 2019-07-03 PROCEDURE — 99220 PR INITIAL OBSERVATION CARE,LEVL III: ICD-10-PCS | Mod: ,,, | Performed by: PHYSICIAN ASSISTANT

## 2019-07-03 PROCEDURE — 84484 ASSAY OF TROPONIN QUANT: CPT | Mod: 91

## 2019-07-03 PROCEDURE — 63600175 PHARM REV CODE 636 W HCPCS: Performed by: EMERGENCY MEDICINE

## 2019-07-03 PROCEDURE — 93005 ELECTROCARDIOGRAM TRACING: CPT

## 2019-07-03 PROCEDURE — 25000003 PHARM REV CODE 250: Performed by: PHYSICIAN ASSISTANT

## 2019-07-03 PROCEDURE — 83880 ASSAY OF NATRIURETIC PEPTIDE: CPT

## 2019-07-03 PROCEDURE — 99220 PR INITIAL OBSERVATION CARE,LEVL III: CPT | Mod: ,,, | Performed by: PHYSICIAN ASSISTANT

## 2019-07-03 PROCEDURE — G0378 HOSPITAL OBSERVATION PER HR: HCPCS

## 2019-07-03 PROCEDURE — 25000003 PHARM REV CODE 250: Performed by: EMERGENCY MEDICINE

## 2019-07-03 PROCEDURE — 25000003 PHARM REV CODE 250: Performed by: NURSE PRACTITIONER

## 2019-07-03 PROCEDURE — 80307 DRUG TEST PRSMV CHEM ANLYZR: CPT

## 2019-07-03 RX ORDER — DIPHENHYDRAMINE HCL 25 MG
25 CAPSULE ORAL
Status: COMPLETED | OUTPATIENT
Start: 2019-07-03 | End: 2019-07-03

## 2019-07-03 RX ORDER — POLYETHYLENE GLYCOL 3350 17 G/17G
17 POWDER, FOR SOLUTION ORAL EVERY 12 HOURS PRN
Status: DISCONTINUED | OUTPATIENT
Start: 2019-07-03 | End: 2019-07-06

## 2019-07-03 RX ORDER — ASPIRIN 81 MG/1
81 TABLET ORAL DAILY
Status: DISCONTINUED | OUTPATIENT
Start: 2019-07-04 | End: 2019-07-18 | Stop reason: HOSPADM

## 2019-07-03 RX ORDER — FUROSEMIDE 10 MG/ML
160 INJECTION INTRAMUSCULAR; INTRAVENOUS 2 TIMES DAILY
Status: DISCONTINUED | OUTPATIENT
Start: 2019-07-03 | End: 2019-07-04

## 2019-07-03 RX ORDER — SODIUM CHLORIDE 0.9 % (FLUSH) 0.9 %
10 SYRINGE (ML) INJECTION
Status: DISCONTINUED | OUTPATIENT
Start: 2019-07-03 | End: 2019-07-18 | Stop reason: HOSPADM

## 2019-07-03 RX ORDER — MORPHINE SULFATE 2 MG/ML
6 INJECTION, SOLUTION INTRAMUSCULAR; INTRAVENOUS
Status: COMPLETED | OUTPATIENT
Start: 2019-07-03 | End: 2019-07-03

## 2019-07-03 RX ORDER — LIDOCAINE 50 MG/G
3 PATCH TOPICAL
Status: DISCONTINUED | OUTPATIENT
Start: 2019-07-03 | End: 2019-07-12

## 2019-07-03 RX ORDER — ONDANSETRON 2 MG/ML
4 INJECTION INTRAMUSCULAR; INTRAVENOUS EVERY 12 HOURS PRN
Status: DISCONTINUED | OUTPATIENT
Start: 2019-07-03 | End: 2019-07-12

## 2019-07-03 RX ORDER — METOPROLOL SUCCINATE 25 MG/1
25 TABLET, EXTENDED RELEASE ORAL DAILY
Status: DISCONTINUED | OUTPATIENT
Start: 2019-07-03 | End: 2019-07-08

## 2019-07-03 RX ORDER — OXYCODONE HYDROCHLORIDE 10 MG/1
10 TABLET ORAL ONCE
Status: COMPLETED | OUTPATIENT
Start: 2019-07-03 | End: 2019-07-03

## 2019-07-03 RX ORDER — POTASSIUM CHLORIDE 20 MEQ/15ML
40 SOLUTION ORAL ONCE
Status: DISCONTINUED | OUTPATIENT
Start: 2019-07-03 | End: 2019-07-04

## 2019-07-03 RX ORDER — ACETAMINOPHEN 325 MG/1
650 TABLET ORAL EVERY 4 HOURS PRN
Status: DISCONTINUED | OUTPATIENT
Start: 2019-07-03 | End: 2019-07-03

## 2019-07-03 RX ORDER — DIPHENHYDRAMINE HCL 25 MG
25 CAPSULE ORAL EVERY 6 HOURS PRN
Status: DISCONTINUED | OUTPATIENT
Start: 2019-07-03 | End: 2019-07-18 | Stop reason: HOSPADM

## 2019-07-03 RX ORDER — CALCIUM CARBONATE 200(500)MG
500 TABLET,CHEWABLE ORAL 3 TIMES DAILY PRN
Status: DISCONTINUED | OUTPATIENT
Start: 2019-07-03 | End: 2019-07-18 | Stop reason: HOSPADM

## 2019-07-03 RX ORDER — RAMELTEON 8 MG/1
8 TABLET ORAL NIGHTLY PRN
Status: DISCONTINUED | OUTPATIENT
Start: 2019-07-03 | End: 2019-07-18 | Stop reason: HOSPADM

## 2019-07-03 RX ORDER — LANOLIN ALCOHOL/MO/W.PET/CERES
400 CREAM (GRAM) TOPICAL 4 TIMES DAILY
Status: DISPENSED | OUTPATIENT
Start: 2019-07-03 | End: 2019-07-04

## 2019-07-03 RX ORDER — MIDAZOLAM HYDROCHLORIDE 1 MG/ML
1 INJECTION INTRAMUSCULAR; INTRAVENOUS
Status: DISCONTINUED | OUTPATIENT
Start: 2019-07-03 | End: 2019-07-04

## 2019-07-03 RX ORDER — ALBUTEROL SULFATE 90 UG/1
1 AEROSOL, METERED RESPIRATORY (INHALATION) EVERY 6 HOURS PRN
Status: DISCONTINUED | OUTPATIENT
Start: 2019-07-03 | End: 2019-07-18 | Stop reason: HOSPADM

## 2019-07-03 RX ORDER — MORPHINE SULFATE 2 MG/ML
6 INJECTION, SOLUTION INTRAMUSCULAR; INTRAVENOUS
Status: DISCONTINUED | OUTPATIENT
Start: 2019-07-03 | End: 2019-07-03

## 2019-07-03 RX ORDER — NITROGLYCERIN 0.4 MG/1
0.4 TABLET SUBLINGUAL EVERY 5 MIN PRN
Status: DISCONTINUED | OUTPATIENT
Start: 2019-07-03 | End: 2019-07-18 | Stop reason: HOSPADM

## 2019-07-03 RX ORDER — FUROSEMIDE 40 MG/1
80 TABLET ORAL 2 TIMES DAILY
Status: DISCONTINUED | OUTPATIENT
Start: 2019-07-03 | End: 2019-07-03

## 2019-07-03 RX ORDER — ENOXAPARIN SODIUM 100 MG/ML
100 INJECTION SUBCUTANEOUS
Status: COMPLETED | OUTPATIENT
Start: 2019-07-03 | End: 2019-07-03

## 2019-07-03 RX ORDER — MORPHINE SULFATE 2 MG/ML
4 INJECTION, SOLUTION INTRAMUSCULAR; INTRAVENOUS EVERY 6 HOURS PRN
Status: DISCONTINUED | OUTPATIENT
Start: 2019-07-03 | End: 2019-07-04

## 2019-07-03 RX ORDER — PANTOPRAZOLE SODIUM 40 MG/1
40 TABLET, DELAYED RELEASE ORAL DAILY
Status: DISCONTINUED | OUTPATIENT
Start: 2019-07-03 | End: 2019-07-18 | Stop reason: HOSPADM

## 2019-07-03 RX ADMIN — MAGNESIUM OXIDE TAB 400 MG (241.3 MG ELEMENTAL MG) 400 MG: 400 (241.3 MG) TAB at 06:07

## 2019-07-03 RX ADMIN — DIPHENHYDRAMINE HYDROCHLORIDE 25 MG: 25 CAPSULE ORAL at 09:07

## 2019-07-03 RX ADMIN — FUROSEMIDE 160 MG: 10 INJECTION, SOLUTION INTRAMUSCULAR; INTRAVENOUS at 09:07

## 2019-07-03 RX ADMIN — METOPROLOL SUCCINATE 25 MG: 25 TABLET, EXTENDED RELEASE ORAL at 12:07

## 2019-07-03 RX ADMIN — LIDOCAINE 3 PATCH: 50 PATCH TOPICAL at 11:07

## 2019-07-03 RX ADMIN — MORPHINE SULFATE 4 MG: 2 INJECTION, SOLUTION INTRAMUSCULAR; INTRAVENOUS at 11:07

## 2019-07-03 RX ADMIN — DIPHENHYDRAMINE HYDROCHLORIDE 25 MG: 25 CAPSULE ORAL at 05:07

## 2019-07-03 RX ADMIN — LOSARTAN POTASSIUM 12.5 MG: 25 TABLET, FILM COATED ORAL at 12:07

## 2019-07-03 RX ADMIN — WARFARIN SODIUM 6 MG: 5 TABLET ORAL at 06:07

## 2019-07-03 RX ADMIN — MORPHINE SULFATE 4 MG: 2 INJECTION, SOLUTION INTRAMUSCULAR; INTRAVENOUS at 03:07

## 2019-07-03 RX ADMIN — MAGNESIUM OXIDE TAB 400 MG (241.3 MG ELEMENTAL MG) 400 MG: 400 (241.3 MG) TAB at 09:07

## 2019-07-03 RX ADMIN — MIDAZOLAM HYDROCHLORIDE 1 MG: 1 INJECTION, SOLUTION INTRAMUSCULAR; INTRAVENOUS at 02:07

## 2019-07-03 RX ADMIN — MORPHINE SULFATE 6 MG: 2 INJECTION, SOLUTION INTRAMUSCULAR; INTRAVENOUS at 02:07

## 2019-07-03 RX ADMIN — ENOXAPARIN SODIUM 100 MG: 100 INJECTION SUBCUTANEOUS at 04:07

## 2019-07-03 RX ADMIN — OXYCODONE HYDROCHLORIDE 10 MG: 10 TABLET ORAL at 11:07

## 2019-07-03 RX ADMIN — FUROSEMIDE 80 MG: 40 TABLET ORAL at 12:07

## 2019-07-03 NOTE — ASSESSMENT & PLAN NOTE
History of threatening behavior most recent admission  Refused life vest/AICD placement and switching to DOAC in the past (admission 05/2019)  Prescribed beta blocker in the past, never took  Will discuss with staff signing behavior contract

## 2019-07-03 NOTE — ASSESSMENT & PLAN NOTE
Last echo 4/2019 with EF 23%, diastolic dysfuction, moderated MR and TR  Started on metoprolol 25 mg last discharge, patient did not take. Will restart and continue lasix, cozaar  Discussed life vest vs AICD placement due to reduced EF in the past, patient refused

## 2019-07-03 NOTE — ASSESSMENT & PLAN NOTE
Chronic, patient evaluated for lumbar back pain at past admission  - CT lumbar spine with lumbar spondylosis L5-S1 with mild posterior disc bulge  - PT/OT consulted, recommended . Discharged from  on 7/1/2019.

## 2019-07-03 NOTE — ED TRIAGE NOTES
Pt reports x4 days R sided pain, tingling, weakness. States pain 10/10 sudden pain when he woke up in the morning. Pain is from face to elbow and hip to ankle; tender to palpate. Denies headache or trauma. Reports hx mi and stroke. On warfarin. R sided foot numbness.

## 2019-07-03 NOTE — ED PROVIDER NOTES
"Encounter Date: 7/2/2019    SCRIBE #1 NOTE: I, Jennie Pabon, am scribing for, and in the presence of,  Dr. Buckley. I have scribed the entire note.       History     Chief Complaint   Patient presents with    Arm Pain     Pt reports right sided face/arm/leg pain, headache, tingling to right side X4 days. Pt states "I can't move any of it because it hurts, I just woke up and it started hurting" when asked to hold right side up. Pt denies unilateral weakness, recent falls or trauma. Pt denies taking any pain medication.      Time patient was seen by the provider: 9:54 PM      The patient is a 53 y.o. male with co-morbidities including: HTN, CVA, CAD, CHF, cardiomegaly, PE on coumadin and MI who presents to the ED with a complaint of right-sided face, arm, and leg pain. Patient reports right-sided face, arm, and leg pain for the past 4 days. He states that he just woke up and he was in pain. Patient states that he can't feel his right foot and he can't move his right leg at all for the last 4 days. He denies falls, trauma, fevers, CP, SOB, abdominal pain, headache, or tingling.  Denies previous similar symptoms.     The history is provided by the patient.     Review of patient's allergies indicates:   Allergen Reactions    Acetaminophen      Itching    Oxycodone-acetaminophen      Other reaction(s): Itching    Ace inhibitors Other (See Comments)     cough     Past Medical History:   Diagnosis Date    Anticoagulant long-term use     Cardiomegaly     CHF (congestive heart failure)     Chronic combined systolic and diastolic congestive heart failure     Coronary artery disease     CVA (cerebrovascular accident)     Heart attack 2006    Hypertension     Hyperthyroidism, subclinical 1/2/2013    MI (myocardial infarction) 9/22/2013    MI in 2009      Paroxysmal atrial fibrillation     PE (pulmonary embolism) 1/1/2013    IN 2010     S/P ablation of atrial flutter 2008    Stroke 2009    no residual weaknesses "     Past Surgical History:   Procedure Laterality Date    RADIOFREQUENCY ABLATION  01/08/2008    for atrial flutter     Family History   Problem Relation Age of Onset    Hypertension Mother     Stroke Mother     Hypertension Father     Alcohol abuse Father     Hypertension Sister     Hypertension Brother      Social History     Tobacco Use    Smoking status: Never Smoker    Smokeless tobacco: Never Used   Substance Use Topics    Alcohol use: No    Drug use: No     Review of Systems  Constitutional:  No Fever  Eyes: No Vision Changes  ENT/Mouth: No sore throat, No rhinorrhea  Cardiovascular:  No Chest Pain, No Palpitations  Respiratory:  No Cough, No SOB  Gastrointestinal:  No Nausea, No Vomiting, No Diarrhea, No abdo pain.  Genitourinary:  No  pain, No dysuria   Musculoskeletal:  No Arthralgias, No Back Pain, No Neck Pain, No recent trauma. Right-sided face, arm, and leg pain.  Skin:  No skin Lesions  Neuro:  No Weakness, No Paresthesias, No Dizziness, No Headache      Physical Exam     Initial Vitals [07/02/19 1926]   BP Pulse Resp Temp SpO2   113/75 83 16 98.1 °F (36.7 °C) 100 %      MAP       --         Physical Exam    Vitals reviewed.    Physical Exam:  GENERAL APPEARANCE: Well developed, well nourished, in no acute distress. Appears uncomfortable.   HENT: Normocephalic, atraumatic    EYES: Sclerae anicteric   NECK: Supple, no thyroid enlargement  CARDIOVASCULAR: Regular rate and rhythm without any murmurs, gallops, rubs.  LUNGS: Speaking in full sentences. Breathing comfortably. Auscultation of the lungs revealed normal breath sounds b/l  ABDOMEN: Soft and nontender, no masses, no rebound or guarding   NEUROLOGIC: Alert, interacting normally. No facial droop.   MSK: Moderate C, T, and L spine tenderness.     RUE: Exquisite tenderness to right shoulder with manipulation.  Unable to range the right shoulder secondary to pain. No tenderness or deformity over the clavicle. No skin changes, swelling,  or deformity. RUE distally neurovascular intact. Good pulse, strength, and movement at the right elbow/wrist/hand.    Chronic bilateral calf skin changes.    RLE: No sensation to right foot, normal sensation to light touch to the calf thigh.  No focal thigh erythema or swelling. Unable to move right extremity (0/5 strength, no movement witnessed despite encouragement in hips/calf/ft).     Left lower extremity, normal sensation and movement. No significant erythema to left extremity. Feet bilaterally appropriately warm distally.     Skin: Warm and dry. No visible rash on exposed areas of skin.    Psych: Mood and affect normal.     ED Course   Procedures  Labs Reviewed - No data to display       Imaging Results    None          Medical Decision Making:   History:   Old Medical Records: I decided to obtain old medical records.  Old Records Summarized: other records.       <> Summary of Records: Records summarized in HPI.  Clinical Tests:   Lab Tests: Ordered and Reviewed  Radiological Study: Ordered and Reviewed  ED Management:  Patient is a 53 y.o. Male with 4 days of right shoulder pain with difficulty moving. Also 4 days of inability to move RLE, no sensation of right foot. No trauma. Lower extremity chronic skin changes, not red, equal size bilaterally, not consistent with DVT or cellulitis, as there is no reason this dx would present with inability to move lower extremity.  Similarly not consistent with right upper extremity DVT given focal shoulder tenderness, exquisite to light touch with no obvious swelling, redness, edema to the extremity.    Of note, patient had shoulder abscess but it was on the left and is appropriately healed.  No obvious/reason for shoulder infection.    Will start workup with labs, CT brain to look for bleed versus ischemia causing right lower extremity weakness, ?  Stroke, and pain control. Will look for evidence of frozen shoulder/adhesive capsulitis.     Update.  Labs are  unremarkable, INR is 1.2, subtherapeutic.  X-rays unremarkable. Single dose of Lovenox given. Given spinal tenderness and inability to move, will focus on spinal pathology vs. other pathology missed on CT. Will look at head, C, T, and L spine with MRI. Will reassess based on these interventions.     Update:  Patient unable to tolerate MRI secondary to feeling like he can't breathe when lying down.  States this has happened to him before.  Will give further pain control, so dose of Versed and oxygen during MRI to help with symptoms. Had partial MRI of his brain, reviewed with Radiology and vascular Neurology findings on the MRI are all old and do not explain patient's new acute symptoms.    Signed out to Dr. Rosario, pending MRI results.  Anticipate need to admit to hospital if continues to be unable to move his right lower extremity, for PT OT eval, continued evaluation and workup.    MDM Complexity Points:   Problem Points:  1.New problem, with no additional ED work-up planned (maximum of 1) - right shoulder pain, inability to move right lower extremity.     Data Points:  Review or order clinical lab tests, Review or order radiology test, Decision to obtain old records (in the EHR), Review and summarization of old records and Discuss test with performing physician/consulting physician - discussed with Radiology team, and vascular Neurology team.    Risk:  High Risk                Scribe Attestation:   Scribe #1: I performed the above scribed service and the documentation accurately describes the services I performed. I attest to the accuracy of the note.               Clinical Impression:       ICD-10-CM ICD-9-CM   1. Shoulder pain M25.519 719.41                                Oziel Buckley MD  07/03/19 0353

## 2019-07-03 NOTE — H&P
"[facility] Hospital Medicine  History & Physical    Patient Name: Hamlet Terrell  MRN: [id number]  Admission Date: 7/2/2019  Attending Physician: José Luis Langford MD   Primary Care Provider: Carlin Swift MD    [facility] Medicine Team: [facility] MED Y Dena Khan PA-C     Patient information was obtained from patient, past medical records and ER records.     Subjective:     Principal Problem:Acute pain of right shoulder due to trauma    Chief Complaint:   Chief Complaint   Patient presents with    Arm Pain     Pt reports right sided face/arm/leg pain, headache, tingling to right side X4 days. Pt states "I can't move any of it because it hurts, I just woke up and it started hurting" when asked to hold right side up. Pt denies unilateral weakness, recent falls or trauma. Pt denies taking any pain medication.         HPI: 54 y/o M with PMH combined CHF (last Echo 4/2019 with EF 23%, diastolic dysfuction, moderated MR and TR), HTN, chronic A. fib (on coumadin), A. flutter s/p ablation, CVA (in 2009), CAD, ?PE (patient reported), non-compliance, and drug seeking behavior presents c/o of right shoulder/arm pain following an injury from right leg weakness. Patient reports waking up to go to the restroom during the night approximately 4 days ago when he was unable to bare weight on right leg due to weakness. He attempted to get back in the bed and aggravated/injured his right shoulder climbing back into bed. He was hoping the pain would resolve on its own but it has persisted for past 4 days prompting him to come to ED. He reports he can move his right upper extremity but refuses to because he does not want to illicit the pain.  Pt denies fever, chills, appetite change, wt change, CP, SOB (at baseline, 2 pillow orthopnea), palpitations, increased leg swelling (chronic leg swelling and venous stasis changes), N/V/D, confusion, slurred speech, dysphagia, seizures, tremors, syncope.    Through chart " review, patient seen in ED for various pain complaints in the past.     On 4/6/17 per Dr. Garza's note: chronic LE heel/ankle pain that initially was thought secondary to LE swelling, and possibly gout but as swelling decreased and no improvement with colchicine.  Pt initially receiving opiates, but this was titrated down and patient was started on Gabapentin, at 400mg BID.  MRI revealed no structural abnormality, there was concern for possible left heel bursitis.  However pain appears out of proportion to exam and findings. Would not recommend any chronic opiate treatment for this pain.     Per discharge note on 4/24/19, patient did ask for a pain medication prescription on discharge, which he does not have an indication for.      On last hospital admission (5/14/19 - 5/17/19): Mr. Terrell's hospitalization was complicated by aggressive behavior and agitation, including threatening remarks made against provider. Patient complained of chest pain, workup for ACS was negative, and the pain improved with GI medications, which is consistent with the etiology of either hearburn/GERD or esophagitis. He was recommended to utilize a PPI and either Maalox or carafate, but on discussing this issue, he continued to escalate his behavior and consistently request narcotic pain medication. Patient threatening to return to ED if discharged. Security was present during discharge counseling session due to the threats.     In 2018, patient prescribed Velpen by 4 different prescribers at various times. Last opioid prescription written in May 2019 by an ER physician.    In the ED: Afebrile without leukocytosis on arrival. Hypertensive at 140s-150s/80s. Hgb 8.7 (baseline). ESR elevated >120, CRP 25.9. INR non-therapeutic (1.2). EKG with A. Fib. Alk Phos 236, bili 1.8. Tox screen positive for benzos and opiates. UA unremarkable. CT head without acute intracranial abnormalities. R shoulder XR with no acute fracture. MRI brain with  chronic infarcts. Cervical, lumbar, and thoracic spine MRI limited due to artifact but showed chronic changes and C5-6 level demonstrates appearance of disc osteophyte disease mild anterior impression upon the dural sac without high-grade stenosis.  Foraminal evaluation is limited, there is suggestion of foraminal encroachment on the right.  Correlation for exiting nerve root symptomatology is needed.     Past Medical History:   Diagnosis Date    Anticoagulant long-term use     Cardiomegaly     Chronic combined systolic and diastolic congestive heart failure     Coronary artery disease     Heart attack 2006    Hypertension     Hyperthyroidism, subclinical 1/2/2013    MI (myocardial infarction) 9/22/2013    MI in 2009      Paroxysmal atrial fibrillation     PE (pulmonary embolism) 1/1/2013    IN 2010     S/P ablation of atrial flutter 2008    Stroke 2009    no residual weaknesses       Past Surgical History:   Procedure Laterality Date    RADIOFREQUENCY ABLATION  01/08/2008    for atrial flutter       Review of patient's allergies indicates:   Allergen Reactions    Acetaminophen      Itching    Oxycodone-acetaminophen      Other reaction(s): Itching    Ace inhibitors Other (See Comments)     cough       No current facility-administered medications on file prior to encounter.      Current Outpatient Medications on File Prior to Encounter   Medication Sig    albuterol (PROVENTIL/VENTOLIN HFA) 90 mcg/actuation inhaler Inhale 1-2 puffs into the lungs every 6 (six) hours as needed for Wheezing. Rescue    aspirin (ECOTRIN) 81 MG EC tablet Take 81 mg by mouth once daily.    calcium carbonate (TUMS) 200 mg calcium (500 mg) chewable tablet Take 1 tablet (500 mg total) by mouth 3 (three) times daily as needed.    ferrous sulfate 325 (65 FE) MG EC tablet Take 1 tablet (325 mg total) by mouth once daily.    furosemide (LASIX) 40 MG tablet Take 80 mg by mouth 2 (two) times daily.    HYDROcodone-acetaminophen  (NORCO) 5-325 mg per tablet Take 1 tablet by mouth every 6 (six) hours as needed for Pain (severe pain preventing sleep).    losartan (COZAAR) 25 MG tablet Take 0.5 tablets (12.5 mg total) by mouth once daily.    metoprolol succinate (TOPROL-XL) 25 MG 24 hr tablet Take 1 tablet (25 mg total) by mouth once daily.    nitroGLYCERIN (NITROSTAT) 0.4 MG SL tablet Place 1 tablet (0.4 mg total) under the tongue every 5 (five) minutes as needed for Chest pain. Tablet, Sublingual Sublingual    pantoprazole (PROTONIX) 40 MG tablet Take 1 tablet (40 mg total) by mouth once daily.    warfarin (COUMADIN) 6 MG tablet Take 1 tablet (6 mg total) by mouth Daily.     Family History     Problem Relation (Age of Onset)    Alcohol abuse Father    Hypertension Mother, Father, Sister, Brother    Stroke Mother        Tobacco Use    Smoking status: Never Smoker    Smokeless tobacco: Never Used   Substance and Sexual Activity    Alcohol use: No    Drug use: No    Sexual activity: Never     Review of Systems   Constitutional: Positive for activity change (limited mobility x 4 days). Negative for chills, diaphoresis, fatigue, fever and unexpected weight change.   HENT: Negative for drooling, sinus pressure, sinus pain and trouble swallowing.    Eyes: Positive for visual disturbance (blurry vision, no change, referred to optho). Negative for photophobia, pain and redness.   Respiratory: Positive for shortness of breath (at baseline, 2 pillow orthopnea). Negative for cough, chest tightness and wheezing.    Cardiovascular: Negative for chest pain, palpitations and leg swelling.   Gastrointestinal: Negative for abdominal pain, constipation, nausea and vomiting.   Endocrine: Negative for cold intolerance, heat intolerance, polydipsia and polyuria.   Genitourinary: Negative for difficulty urinating, flank pain, frequency and hematuria.   Musculoskeletal: Positive for arthralgias, gait problem, myalgias and neck pain. Negative for joint  swelling and neck stiffness.   Skin: Positive for color change and rash. Negative for pallor and wound.   Neurological: Positive for weakness (R leg) and numbness. Negative for dizziness, tremors, seizures, syncope, facial asymmetry, speech difficulty, light-headedness and headaches.   Hematological: Negative for adenopathy. Does not bruise/bleed easily.   Psychiatric/Behavioral: Negative for agitation, confusion, decreased concentration and hallucinations. The patient is not nervous/anxious.      Objective:     Vital Signs (Most Recent):  Temp: 99.7 °F (37.6 °C) (07/03/19 1148)  Pulse: 88 (07/03/19 1148)  Resp: 17 (07/03/19 1148)  BP: (!) 152/68 (07/03/19 1148)  SpO2: (!) 93 % (07/03/19 1148) Vital Signs (24h Range):  Temp:  [98 °F (36.7 °C)-99.7 °F (37.6 °C)] 99.7 °F (37.6 °C)  Pulse:  [] 88  Resp:  [14-20] 17  SpO2:  [92 %-100 %] 93 %  BP: (113-155)/(68-91) 152/68     Weight: 118.4 kg (261 lb)  Body mass index is 38.54 kg/m².    Physical Exam   Constitutional: He is oriented to person, place, and time. He appears well-developed. He has a sickly appearance.   Obese disheveled appearance   HENT:   Head: Normocephalic and atraumatic.   Eyes: Pupils are equal, round, and reactive to light. EOM are normal.   Neck: Normal range of motion. Neck supple. No tracheal deviation present. No thyromegaly present.   Cardiovascular: Normal rate. A regularly irregular rhythm present.   No murmur heard.  Pulmonary/Chest: Effort normal and breath sounds normal. He has no wheezes.   Abdominal: Soft. Bowel sounds are normal. There is no tenderness.   Musculoskeletal: He exhibits edema (b/l 1+ lower extremity). He exhibits no tenderness.        Right shoulder: He exhibits decreased range of motion (pt refused exam twice).   Feet:   Right Foot:   Protective Sensation: 4 sites tested. 2 sites sensed.   Left Foot:   Protective Sensation: 4 sites tested. 2 sites sensed.   Lymphadenopathy:     He has no cervical adenopathy.    Neurological: He is alert and oriented to person, place, and time. He has normal reflexes.   Pt with limited participation in exam (attempted twice, second attempt after heat pack, lidocaine pack, and pain medication administered).  Pt would squeeze fingers with 5/5 strength b/l.  Pt refused for provider to touch R neck/shoulder/arm.  Pt refused to participate in ROM exam of neck.  Decreased sensation R dorsum of foot L5-S1.   Skin: Skin is warm and dry.   Chronic venous stasis changes b/l (see photo)   Psychiatric: His behavior is normal. His affect is angry.   Nursing note and vitals reviewed.        CRANIAL NERVES     CN III, IV, VI   Pupils are equal, round, and reactive to light.  Extraocular motions are normal.                Significant Labs: All pertinent labs within the past 24 hours have been reviewed.    Significant Imaging: I have reviewed all pertinent imaging results/findings within the past 24 hours.    Assessment/Plan:     * Acute pain of right shoulder due to trauma  R shoulder XR with no acute fracture.   Cervical, lumbar, and thoracic spine MRI limited due to artifact but showed chronic changes and C5-6 level demonstrates appearance of disc osteophyte disease mild anterior impression upon the dural sac without high-grade stenosis.  Foraminal evaluation is limited, there is suggestion of foraminal encroachment on the right.  Correlation for exiting nerve root symptomatology is needed.   Heat pack, lidocaine patch, and morphine for pain control  Physical exam limited do to patient's refusal to participate (attempted twice, before and after pain medication).  Will attempt third time with Dr. Rollins.  PT/OT    Neuropathy of both feet  See imaging reports above and below (MRI cervical/thoracic/lumbar)  Chronic venous stasis, patient with complaints of LE pain/blisters in the past   A1c 6.0%, ESR >120, CRP 25.9  Will check B vitamins      Weakness of lower extremity  See above      Lumbar back  pain  Chronic, patient evaluated for lumbar back pain at past admission  - CT lumbar spine with lumbar spondylosis L5-S1 with mild posterior disc bulge  - PT/OT consulted, recommended . Discharged from  on 7/1/2019.      Atrial fibrillation, chronic  toprol xl on patient med list, pt reports he is currently not taking BB  Will order toprol xl 25 mg, continue coumadin with bridging   Tele      History of CVA (cerebrovascular accident)  CT head without acute intracranial abnormalities.   MRI brain with chronic infarcts  Continue ASA 81 mg, coumadin, encourage compliance with BB for rate control      Personal history of venous thrombosis and embolism        Chronic anticoagulation  Pharmacy consulted.  INR 1.2  [id number]  Pharmacy recommended bridging with lovenox 120 mg bid (give next dose at 1700, pt received 100 mg in ED).  Continue warfarin 6 mg daily.  Last admission 5/14-5/16 pt was educated on DOAC and refused to change.  Daily INR.      Essential hypertension  Hypertensive on admission, did not take home meds  Home regimen: lasix 80 mg bid, cozaar 12.5 mg daily, metoprolol 25 mg daily   Will continue       Chronic combined systolic and diastolic heart failure  Last echo 4/2019 with EF 23%, diastolic dysfuction, moderated MR and TR  Started on metoprolol 25 mg last discharge, patient did not take. Will restart and continue lasix, cozaar  Discussed life vest vs AICD placement due to reduced EF in the past, patient refused        Chronic kidney disease  CKD II-III per past notes  Stable on arrival, Cr 1.3, BUN >60      Non compliance w medication regimen  History of threatening behavior most recent admission  Refused life vest/AICD placement and switching to DOAC in the past (admission 05/2019)  Prescribed beta blocker in the past, never took  Will discuss with staff signing behavior contract       Elevated alkaline phosphatase level  Elevated in the past with trend between ~190-220  AST/ALT WNL, bili  mildly elevated at 1.8  Per past evaluation (4/2019):   - Small ascites noted on CT  - RUQ abdominal ultrasound without liver abnormality, no ascites noted, cholelithiasis vs. gallbladder cyst  Patient denies symptoms, continue to monitor    Venous stasis of lower extremity  Chronic  Compression stockings and leg elevation encouraged        VTE Risk Mitigation (From admission, onward)        Ordered     warfarin tablet 6 mg  Daily      07/03/19 1311     IP VTE HIGH RISK PATIENT  Once      07/03/19 0955     Place sequential compression device  Until discontinued      07/03/19 0955     Place CAMI hose  Until discontinued      07/03/19 0841             Dena Khan PA-C  Department of Hospital Medicine   [facility]

## 2019-07-03 NOTE — ED PROVIDER NOTES
Signed out to me pending MRIs, which show no acute pathology to explain symptoms. Patient continues to be unable to move his right lower extremity.  He has warm bilateral lower extremities with cap refill less than 2 sec.  He complains of pain in his right upper extremity, but denies any sensation in his right lower extremity.  Both unclear cause of this.  Labs at baseline.  Discussed with hospital medicine will place in observation.     Cristofer Rosario MD  07/03/19 0661

## 2019-07-03 NOTE — ASSESSMENT & PLAN NOTE
CT head without acute intracranial abnormalities.   MRI brain with chronic infarcts  Continue ASA 81 mg, coumadin, encourage compliance with BB for rate control

## 2019-07-03 NOTE — CONSULTS
Food & Nutrition  Education    Diet Education: Vitamin K   Learners: Pt      Nutrition Education provided with handouts: Vitamin K and Medications      Comments: Last educated 5/16/16. Reviewed and discussed handout. Pt did not open his eyes during education and states he doesn't eat greens. Pt verbalized understanding with no questions or concerns at this time.      Follow-Up: Yes     Please re-consult as needed.

## 2019-07-03 NOTE — SUBJECTIVE & OBJECTIVE
Past Medical History:   Diagnosis Date    Anticoagulant long-term use     Cardiomegaly     Chronic combined systolic and diastolic congestive heart failure     Coronary artery disease     Heart attack 2006    Hypertension     Hyperthyroidism, subclinical 1/2/2013    MI (myocardial infarction) 9/22/2013    MI in 2009      Paroxysmal atrial fibrillation     PE (pulmonary embolism) 1/1/2013    IN 2010     S/P ablation of atrial flutter 2008    Stroke 2009    no residual weaknesses       Past Surgical History:   Procedure Laterality Date    RADIOFREQUENCY ABLATION  01/08/2008    for atrial flutter       Review of patient's allergies indicates:   Allergen Reactions    Acetaminophen      Itching    Oxycodone-acetaminophen      Other reaction(s): Itching    Ace inhibitors Other (See Comments)     cough       No current facility-administered medications on file prior to encounter.      Current Outpatient Medications on File Prior to Encounter   Medication Sig    albuterol (PROVENTIL/VENTOLIN HFA) 90 mcg/actuation inhaler Inhale 1-2 puffs into the lungs every 6 (six) hours as needed for Wheezing. Rescue    aspirin (ECOTRIN) 81 MG EC tablet Take 81 mg by mouth once daily.    calcium carbonate (TUMS) 200 mg calcium (500 mg) chewable tablet Take 1 tablet (500 mg total) by mouth 3 (three) times daily as needed.    ferrous sulfate 325 (65 FE) MG EC tablet Take 1 tablet (325 mg total) by mouth once daily.    furosemide (LASIX) 40 MG tablet Take 80 mg by mouth 2 (two) times daily.    HYDROcodone-acetaminophen (NORCO) 5-325 mg per tablet Take 1 tablet by mouth every 6 (six) hours as needed for Pain (severe pain preventing sleep).    losartan (COZAAR) 25 MG tablet Take 0.5 tablets (12.5 mg total) by mouth once daily.    metoprolol succinate (TOPROL-XL) 25 MG 24 hr tablet Take 1 tablet (25 mg total) by mouth once daily.    nitroGLYCERIN (NITROSTAT) 0.4 MG SL tablet Place 1 tablet (0.4 mg total) under the  tongue every 5 (five) minutes as needed for Chest pain. Tablet, Sublingual Sublingual    pantoprazole (PROTONIX) 40 MG tablet Take 1 tablet (40 mg total) by mouth once daily.    warfarin (COUMADIN) 6 MG tablet Take 1 tablet (6 mg total) by mouth Daily.     Family History     Problem Relation (Age of Onset)    Alcohol abuse Father    Hypertension Mother, Father, Sister, Brother    Stroke Mother        Tobacco Use    Smoking status: Never Smoker    Smokeless tobacco: Never Used   Substance and Sexual Activity    Alcohol use: No    Drug use: No    Sexual activity: Never     Review of Systems   Constitutional: Positive for activity change (limited mobility x 4 days). Negative for chills, diaphoresis, fatigue, fever and unexpected weight change.   HENT: Negative for drooling, sinus pressure, sinus pain and trouble swallowing.    Eyes: Positive for visual disturbance (blurry vision, no change, referred to optho). Negative for photophobia, pain and redness.   Respiratory: Positive for shortness of breath (at baseline, 2 pillow orthopnea). Negative for cough, chest tightness and wheezing.    Cardiovascular: Negative for chest pain, palpitations and leg swelling.   Gastrointestinal: Negative for abdominal pain, constipation, nausea and vomiting.   Endocrine: Negative for cold intolerance, heat intolerance, polydipsia and polyuria.   Genitourinary: Negative for difficulty urinating, flank pain, frequency and hematuria.   Musculoskeletal: Positive for arthralgias, gait problem, myalgias and neck pain. Negative for joint swelling and neck stiffness.   Skin: Positive for color change and rash. Negative for pallor and wound.   Neurological: Positive for weakness (R leg) and numbness. Negative for dizziness, tremors, seizures, syncope, facial asymmetry, speech difficulty, light-headedness and headaches.   Hematological: Negative for adenopathy. Does not bruise/bleed easily.   Psychiatric/Behavioral: Negative for agitation,  confusion, decreased concentration and hallucinations. The patient is not nervous/anxious.      Objective:     Vital Signs (Most Recent):  Temp: 99.7 °F (37.6 °C) (07/03/19 1148)  Pulse: 88 (07/03/19 1148)  Resp: 17 (07/03/19 1148)  BP: (!) 152/68 (07/03/19 1148)  SpO2: (!) 93 % (07/03/19 1148) Vital Signs (24h Range):  Temp:  [98 °F (36.7 °C)-99.7 °F (37.6 °C)] 99.7 °F (37.6 °C)  Pulse:  [] 88  Resp:  [14-20] 17  SpO2:  [92 %-100 %] 93 %  BP: (113-155)/(68-91) 152/68     Weight: 118.4 kg (261 lb)  Body mass index is 38.54 kg/m².    Physical Exam   Constitutional: He is oriented to person, place, and time. He appears well-developed. He has a sickly appearance.   Obese disheveled appearance   HENT:   Head: Normocephalic and atraumatic.   Eyes: Pupils are equal, round, and reactive to light. EOM are normal.   Neck: Normal range of motion. Neck supple. No tracheal deviation present. No thyromegaly present.   Cardiovascular: Normal rate. A regularly irregular rhythm present.   No murmur heard.  Pulmonary/Chest: Effort normal and breath sounds normal. He has no wheezes.   Abdominal: Soft. Bowel sounds are normal. There is no tenderness.   Musculoskeletal: He exhibits edema (b/l 1+ lower extremity). He exhibits no tenderness.        Right shoulder: He exhibits decreased range of motion (pt refused exam twice).   Feet:   Right Foot:   Protective Sensation: 4 sites tested. 2 sites sensed.   Left Foot:   Protective Sensation: 4 sites tested. 2 sites sensed.   Lymphadenopathy:     He has no cervical adenopathy.   Neurological: He is alert and oriented to person, place, and time. He has normal reflexes.   Pt with limited participation in exam (attempted twice, second attempt after heat pack, lidocaine pack, and pain medication administered).  Pt would squeeze fingers with 5/5 strength b/l.  Pt refused for provider to touch R neck/shoulder/arm.  Pt refused to participate in ROM exam of neck.  Decreased sensation R dorsum  of foot L5-S1.   Skin: Skin is warm and dry.   Chronic venous stasis changes b/l (see photo)   Psychiatric: His behavior is normal. His affect is angry.   Nursing note and vitals reviewed.        CRANIAL NERVES     CN III, IV, VI   Pupils are equal, round, and reactive to light.  Extraocular motions are normal.                Significant Labs: All pertinent labs within the past 24 hours have been reviewed.    Significant Imaging: I have reviewed all pertinent imaging results/findings within the past 24 hours.

## 2019-07-03 NOTE — ASSESSMENT & PLAN NOTE
R shoulder XR with no acute fracture.   Cervical, lumbar, and thoracic spine MRI limited due to artifact but showed chronic changes and C5-6 level demonstrates appearance of disc osteophyte disease mild anterior impression upon the dural sac without high-grade stenosis.  Foraminal evaluation is limited, there is suggestion of foraminal encroachment on the right.  Correlation for exiting nerve root symptomatology is needed.   Heat pack, lidocaine patch, and morphine for pain control  Physical exam limited do to patient's refusal to participate (attempted twice, before and after pain medication).  Will attempt third time with Dr. Rollins.  PT/OT

## 2019-07-03 NOTE — ASSESSMENT & PLAN NOTE
Elevated in the past with trend between ~190-220  AST/ALT WNL, bili mildly elevated at 1.8  Per past evaluation (4/2019):   - Small ascites noted on CT  - RUQ abdominal ultrasound without liver abnormality, no ascites noted, cholelithiasis vs. gallbladder cyst  Patient denies symptoms, continue to monitor

## 2019-07-03 NOTE — PLAN OF CARE
Problem: Fall Injury Risk  Goal: Absence of Fall and Fall-Related Injury  Outcome: Ongoing (interventions implemented as appropriate)  Pt. Has no falls this shift.

## 2019-07-03 NOTE — PROGRESS NOTES
Pt. Lying in bed eyes closed. Arouses easily to verbal stimuli. Nand. Resp. Even and unlabored. Skin warm and dry. Pt. Continues alert and oriented. Call light in reach. Bed in lowest position wheels locked.

## 2019-07-03 NOTE — ASSESSMENT & PLAN NOTE
See imaging reports above and below (MRI cervical/thoracic/lumbar)  Chronic venous stasis, patient with complaints of LE pain/blisters in the past   A1c 6.0%, ESR >120, CRP 25.9  Will check B vitamins

## 2019-07-03 NOTE — ASSESSMENT & PLAN NOTE
Pharmacy consulted.  INR 1.2  [id number]  Pharmacy recommended bridging with lovenox 120 mg bid (give next dose at 1700, pt received 100 mg in ED).  Continue warfarin 6 mg daily.  Last admission 5/14-5/16 pt was educated on DOAC and refused to change.  Daily INR.

## 2019-07-03 NOTE — ED NOTES
"LOC: The patient is awake, alert, and oriented to place, time, situation. Affect is appropriate.  Speech is appropriate and clear.     APPEARANCE: Patient resting comfortably in no acute distress.  Patient is clean and well groomed.    SKIN: The skin is warm and dry; color consistent with ethnicity.  Patient has normal skin turgor and moist mucus membranes.  Skin intact; no breakdown, rash or bruising noted. Patient has blisters on BLE (shin/calf). Patient states blisters appeared during last hospitilization for fluid overload.     MUSCULOSKELETAL: Patient reports weakness/paralysis in RUE (able to move R fingers/wrist) & RLE x4 days. Reports pain tender to soft touch to R side of body. Denies back pain or injury.     RESPIRATORY: Airway is open and patent. Respirations spontaneous, even, easy, and non-labored.  Patient has a normal effort and rate.  No accessory muscle use noted. Denies cough. Reports chronic sob "since birth" with no acute changes.     CARDIAC:  Patient afib rvr 103bpm. BLE edema noted.. No complaints of chest pain.     ABDOMEN: Soft and non tender to palpation.  No distention noted. Patient denies n/v/d.     NEUROLOGIC: Eyes open spontaneously.  Behavior appropriate to situation.  Follows commands; facial expression symmetrical. Reports R sided tingling and pain to RUE and RLE. Reports R foot. Reports R face pain.  "

## 2019-07-03 NOTE — ASSESSMENT & PLAN NOTE
Hypertensive on admission, did not take home meds  Home regimen: lasix 80 mg bid, cozaar 12.5 mg daily, metoprolol 25 mg daily   Will continue

## 2019-07-03 NOTE — HPI
52 y/o M with PMH combined CHF (last Echo 4/2019 with EF 23%, diastolic dysfuction, moderated MR and TR), HTN, chronic A. fib (on coumadin), A. flutter s/p ablation, CVA (in 2009), CAD, ?PE (patient reported), non-compliance, and drug seeking behavior presents c/o of right shoulder/arm pain following an injury from right leg weakness. Patient reports waking up to go to the restroom during the night approximately 4 days ago when he was unable to bare weight on right leg due to weakness. He attempted to get back in the bed and aggravated/injured his right shoulder climbing back into bed. He was hoping the pain would resolve on its own but it has persisted for past 4 days prompting him to come to ED. He reports he can move his right upper extremity but refuses to because he does not want to illicit the pain.  Pt denies fever, chills, appetite change, wt change, CP, SOB (at baseline, 2 pillow orthopnea), palpitations, increased leg swelling (chronic leg swelling and venous stasis changes), N/V/D, confusion, slurred speech, dysphagia, seizures, tremors, syncope.    Through chart review, patient seen in ED for various pain complaints in the past.     On 4/6/17 per Dr. Garza's note: chronic LE heel/ankle pain that initially was thought secondary to LE swelling, and possibly gout but as swelling decreased and no improvement with colchicine.  Pt initially receiving opiates, but this was titrated down and patient was started on Gabapentin, at 400mg BID.  MRI revealed no structural abnormality, there was concern for possible left heel bursitis.  However pain appears out of proportion to exam and findings. Would not recommend any chronic opiate treatment for this pain.     Per discharge note on 4/24/19, patient did ask for a pain medication prescription on discharge, which he does not have an indication for.      On last hospital admission (5/14/19 - 5/17/19): Mr. Terrell's hospitalization was complicated by aggressive  behavior and agitation, including threatening remarks made against provider. Patient complained of chest pain, workup for ACS was negative, and the pain improved with GI medications, which is consistent with the etiology of either hearburn/GERD or esophagitis. He was recommended to utilize a PPI and either Maalox or carafate, but on discussing this issue, he continued to escalate his behavior and consistently request narcotic pain medication. Patient threatening to return to ED if discharged. Security was present during discharge counseling session due to the threats.     In 2018, patient prescribed [place] by 4 different prescribers at various times. Last opioid prescription written in May 2019 by an ER physician.    In the ED: Afebrile without leukocytosis on arrival. Hypertensive at 140s-150s/80s. Hgb 8.7 (baseline). ESR elevated >120, CRP 25.9. INR non-therapeutic (1.2). EKG with A. Fib. Alk Phos 236, bili 1.8. Tox screen positive for benzos and opiates. UA unremarkable. CT head without acute intracranial abnormalities. R shoulder XR with no acute fracture. MRI brain with chronic infarcts. Cervical, lumbar, and thoracic spine MRI limited due to artifact but showed chronic changes and C5-6 level demonstrates appearance of disc osteophyte disease mild anterior impression upon the dural sac without high-grade stenosis.  Foraminal evaluation is limited, there is suggestion of foraminal encroachment on the right.  Correlation for exiting nerve root symptomatology is needed.

## 2019-07-03 NOTE — MEDICAL/APP STUDENT
"  History     Chief Complaint   Patient presents with    Arm Pain     Pt reports right sided face/arm/leg pain, headache, tingling to right side X4 days. Pt states "I can't move any of it because it hurts, I just woke up and it started hurting" when asked to hold right side up. Pt denies unilateral weakness, recent falls or trauma. Pt denies taking any pain medication.      Mr Terrell is a 54 yo M w/ a pmhx of CHF, CAD (MI 2009), PE (2010), CVA (on warfarin), afib (ablation '08), and HTN who presents to the ED with right sided weakness and pain. HE reports waknig up 4 days ago with extreme pain in his right side with inability to move the right foot. The pain is 8/10 and is worse with any movement or palpation. There is also associated tingling with the pain. Pain is present on the right side of the face, from the shoulder distally to the elbow, and from the hip distally to the knee. Denies any trauma or inciting event. He has significant peripheral edema but states that this pain is different. He has not taken any medication for the pain. Denies fevers, nausea, vomiting, diarrhea, urinary symptoms, chest pain, or difficulty breathing. Denies any recent change to medication. Denies drug use.           Past Medical History:   Diagnosis Date    Anticoagulant long-term use     Cardiomegaly     CHF (congestive heart failure)     Chronic combined systolic and diastolic congestive heart failure     Coronary artery disease     CVA (cerebrovascular accident)     Heart attack 2006    Hypertension     Hyperthyroidism, subclinical 1/2/2013    MI (myocardial infarction) 9/22/2013    MI in 2009      Paroxysmal atrial fibrillation     PE (pulmonary embolism) 1/1/2013    IN 2010     S/P ablation of atrial flutter 2008    Stroke 2009    no residual weaknesses       Past Surgical History:   Procedure Laterality Date    RADIOFREQUENCY ABLATION  01/08/2008    for atrial flutter       Family History   Problem Relation " "Age of Onset    Hypertension Mother     Stroke Mother     Hypertension Father     Alcohol abuse Father     Hypertension Sister     Hypertension Brother        Social History     Tobacco Use    Smoking status: Never Smoker    Smokeless tobacco: Never Used   Substance Use Topics    Alcohol use: No    Drug use: No       Review of Systems   Constitutional: Negative for fatigue and fever.   HENT: Negative for sore throat.    Eyes: Negative for visual disturbance.   Respiratory: Negative for cough and shortness of breath.    Cardiovascular: Positive for leg swelling. Negative for chest pain and palpitations.   Gastrointestinal: Negative for abdominal pain, diarrhea, nausea and vomiting.   Genitourinary: Negative for dysuria, hematuria and urgency.   Musculoskeletal: Positive for arthralgias and gait problem. Negative for back pain and neck pain.   Skin: Negative for rash.   Neurological: Positive for numbness. Negative for weakness and headaches.   Hematological: Does not bruise/bleed easily.       Physical Exam   /75 (BP Location: Left arm, Patient Position: Sitting)   Pulse 83   Temp 98.1 °F (36.7 °C) (Oral)   Resp 16   Ht 5' 9" (1.753 m)   Wt 118.4 kg (261 lb)   SpO2 100%   BMI 38.54 kg/m²     Physical Exam    Constitutional: Vital signs are normal. He is Obese .   Appears uncomfortable   HENT:   Head: Normocephalic and atraumatic.   Eyes: EOM are normal. Pupils are equal, round, and reactive to light.   Cardiovascular: Normal rate, regular rhythm and normal heart sounds.   Weak peripheral pulses   Pulmonary/Chest: Effort normal and breath sounds normal.   Abdominal: Soft. Normal appearance. There is no tenderness. There is no rigidity and no guarding.   Neurological: He is alert and oriented to person, place, and time. No cranial nerve deficit.   Loss of sensation distal to the mid shin on the Right side  Inability to move the right leg   Skin:   Bilateral blistering of the lower legs below the " knees  Significant peripheral edema bilaterally         ED Course

## 2019-07-03 NOTE — ASSESSMENT & PLAN NOTE
toprol xl on patient med list, pt reports he is currently not taking BB  Will order toprol xl 25 mg, continue coumadin with bridging   Tele

## 2019-07-03 NOTE — PROGRESS NOTES
Pt. Received from er via stretcher. Alert and oriented. Right side weakness noted. Oriented pt. To room and to call for assistance if needed. Call light placed in reach. Bed in lowest position. Wheels nicky.

## 2019-07-03 NOTE — PROGRESS NOTES
PHARMACY CONSULT NOTE: WARFARIN  Hamlet Terrell is a 53 y.o. male on warfarin therapy for Atrial Fibrillation. PharmD has been consulted for warfarin dosing.    Current order: None  Home dose: 6 mg QD   Coumadin clinic enrollment: Active  INR goal: 2-3    Lab Results   Component Value Date    INR 1.2 07/02/2019    INR 1.2 06/26/2019    INR 1.2 06/19/2019     Significant drug interactions: None     Recommendation(s):    Restart warfarin at 6 mg QD    Given [id number] score=4  and current INR-1.2 may consider bridging with enoxaparin or heparin gtt until INR therapeutic    Pharmacy will continue to follow and monitor warfarin    Thank you for the consult,  Catalina Rowe  [id number]     **Note: Consults are reviewed Monday-Friday 7:00am-3:30pm. THE ABOVE RECOMMENDATIONS ARE ONLY SUGGESTED.THE RECOMMENDATIONS SHOULD BE CONSIDERED IN CONJUNCTION WITH ALL PATIENT FACTORS. **

## 2019-07-04 PROBLEM — R50.9 FEBRILE ILLNESS, ACUTE: Status: ACTIVE | Noted: 2019-07-04

## 2019-07-04 PROBLEM — M25.511 SHOULDER PAIN, RIGHT: Status: ACTIVE | Noted: 2019-07-04

## 2019-07-04 LAB
25(OH)D3+25(OH)D2 SERPL-MCNC: 9 NG/ML (ref 30–96)
ALBUMIN SERPL BCP-MCNC: 3.1 G/DL (ref 3.5–5.2)
ALP SERPL-CCNC: 187 U/L (ref 55–135)
ALT SERPL W/O P-5'-P-CCNC: 5 U/L (ref 10–44)
ANION GAP SERPL CALC-SCNC: 11 MMOL/L (ref 8–16)
ANION GAP SERPL CALC-SCNC: 12 MMOL/L (ref 8–16)
AST SERPL-CCNC: 15 U/L (ref 10–40)
BASOPHILS # BLD AUTO: 0.02 K/UL (ref 0–0.2)
BASOPHILS NFR BLD: 0.2 % (ref 0–1.9)
BILIRUB SERPL-MCNC: 2.3 MG/DL (ref 0.1–1)
BUN SERPL-MCNC: 21 MG/DL (ref 6–20)
BUN SERPL-MCNC: 28 MG/DL (ref 6–20)
CALCIUM SERPL-MCNC: 9.3 MG/DL (ref 8.7–10.5)
CALCIUM SERPL-MCNC: 9.4 MG/DL (ref 8.7–10.5)
CHLORIDE SERPL-SCNC: 100 MMOL/L (ref 95–110)
CHLORIDE SERPL-SCNC: 97 MMOL/L (ref 95–110)
CK SERPL-CCNC: 187 U/L (ref 20–200)
CO2 SERPL-SCNC: 25 MMOL/L (ref 23–29)
CO2 SERPL-SCNC: 27 MMOL/L (ref 23–29)
CREAT SERPL-MCNC: 1.7 MG/DL (ref 0.5–1.4)
CREAT SERPL-MCNC: 1.9 MG/DL (ref 0.5–1.4)
CRP SERPL-MCNC: 87.1 MG/L (ref 0–8.2)
DIFFERENTIAL METHOD: ABNORMAL
EOSINOPHIL # BLD AUTO: 0.1 K/UL (ref 0–0.5)
EOSINOPHIL NFR BLD: 0.8 % (ref 0–8)
ERYTHROCYTE [DISTWIDTH] IN BLOOD BY AUTOMATED COUNT: 16.3 % (ref 11.5–14.5)
ERYTHROCYTE [SEDIMENTATION RATE] IN BLOOD BY WESTERGREN METHOD: 117 MM/HR (ref 0–23)
EST. GFR  (AFRICAN AMERICAN): 45.8 ML/MIN/1.73 M^2
EST. GFR  (AFRICAN AMERICAN): 52.4 ML/MIN/1.73 M^2
EST. GFR  (NON AFRICAN AMERICAN): 39.7 ML/MIN/1.73 M^2
EST. GFR  (NON AFRICAN AMERICAN): 45.4 ML/MIN/1.73 M^2
GLUCOSE SERPL-MCNC: 87 MG/DL (ref 70–110)
GLUCOSE SERPL-MCNC: 97 MG/DL (ref 70–110)
GRAM STN SPEC: NORMAL
GRAM STN SPEC: NORMAL
HCT VFR BLD AUTO: 25.5 % (ref 40–54)
HGB BLD-MCNC: 7.4 G/DL (ref 14–18)
IMM GRANULOCYTES # BLD AUTO: 0.05 K/UL (ref 0–0.04)
IMM GRANULOCYTES NFR BLD AUTO: 0.5 % (ref 0–0.5)
INR PPP: 1.3 (ref 0.8–1.2)
LACTATE SERPL-SCNC: 1.1 MMOL/L (ref 0.5–2.2)
LYMPHOCYTES # BLD AUTO: 1.3 K/UL (ref 1–4.8)
LYMPHOCYTES NFR BLD: 13.1 % (ref 18–48)
MAGNESIUM SERPL-MCNC: 1.7 MG/DL (ref 1.6–2.6)
MAGNESIUM SERPL-MCNC: 1.8 MG/DL (ref 1.6–2.6)
MCH RBC QN AUTO: 23.7 PG (ref 27–31)
MCHC RBC AUTO-ENTMCNC: 29 G/DL (ref 32–36)
MCV RBC AUTO: 82 FL (ref 82–98)
MONOCYTES # BLD AUTO: 1.8 K/UL (ref 0.3–1)
MONOCYTES NFR BLD: 17.8 % (ref 4–15)
NEUTROPHILS # BLD AUTO: 6.8 K/UL (ref 1.8–7.7)
NEUTROPHILS NFR BLD: 67.6 % (ref 38–73)
NRBC BLD-RTO: 0 /100 WBC
PLATELET # BLD AUTO: 288 K/UL (ref 150–350)
PMV BLD AUTO: 10.1 FL (ref 9.2–12.9)
POTASSIUM SERPL-SCNC: 3.6 MMOL/L (ref 3.5–5.1)
POTASSIUM SERPL-SCNC: 4.5 MMOL/L (ref 3.5–5.1)
PROCALCITONIN SERPL IA-MCNC: 1.33 NG/ML
PROT SERPL-MCNC: 8.1 G/DL (ref 6–8.4)
PROTHROMBIN TIME: 12.7 SEC (ref 9–12.5)
RBC # BLD AUTO: 3.12 M/UL (ref 4.6–6.2)
SODIUM SERPL-SCNC: 136 MMOL/L (ref 136–145)
SODIUM SERPL-SCNC: 136 MMOL/L (ref 136–145)
TROPONIN I SERPL DL<=0.01 NG/ML-MCNC: 0.12 NG/ML (ref 0–0.03)
TROPONIN I SERPL DL<=0.01 NG/ML-MCNC: 0.18 NG/ML (ref 0–0.03)
URATE SERPL-MCNC: 12.1 MG/DL (ref 3.4–7)
VIT B12 SERPL-MCNC: 481 PG/ML (ref 210–950)
WBC # BLD AUTO: 10.1 K/UL (ref 3.9–12.7)

## 2019-07-04 PROCEDURE — 87070 CULTURE OTHR SPECIMN AEROBIC: CPT

## 2019-07-04 PROCEDURE — 25000003 PHARM REV CODE 250: Performed by: PHYSICIAN ASSISTANT

## 2019-07-04 PROCEDURE — 87206 SMEAR FLUORESCENT/ACID STAI: CPT

## 2019-07-04 PROCEDURE — 83735 ASSAY OF MAGNESIUM: CPT

## 2019-07-04 PROCEDURE — 63600175 PHARM REV CODE 636 W HCPCS: Performed by: HOSPITALIST

## 2019-07-04 PROCEDURE — 82306 VITAMIN D 25 HYDROXY: CPT

## 2019-07-04 PROCEDURE — 99233 PR SUBSEQUENT HOSPITAL CARE,LEVL III: ICD-10-PCS | Mod: ,,, | Performed by: HOSPITALIST

## 2019-07-04 PROCEDURE — 84484 ASSAY OF TROPONIN QUANT: CPT

## 2019-07-04 PROCEDURE — 83605 ASSAY OF LACTIC ACID: CPT

## 2019-07-04 PROCEDURE — 63600175 PHARM REV CODE 636 W HCPCS: Performed by: PHYSICIAN ASSISTANT

## 2019-07-04 PROCEDURE — 84207 ASSAY OF VITAMIN B-6: CPT

## 2019-07-04 PROCEDURE — 85610 PROTHROMBIN TIME: CPT

## 2019-07-04 PROCEDURE — 86140 C-REACTIVE PROTEIN: CPT

## 2019-07-04 PROCEDURE — 85025 COMPLETE CBC W/AUTO DIFF WBC: CPT

## 2019-07-04 PROCEDURE — 87075 CULTR BACTERIA EXCEPT BLOOD: CPT

## 2019-07-04 PROCEDURE — 84425 ASSAY OF VITAMIN B-1: CPT

## 2019-07-04 PROCEDURE — 83735 ASSAY OF MAGNESIUM: CPT | Mod: 91

## 2019-07-04 PROCEDURE — 99233 SBSQ HOSP IP/OBS HIGH 50: CPT | Mod: ,,, | Performed by: HOSPITALIST

## 2019-07-04 PROCEDURE — 84145 PROCALCITONIN (PCT): CPT

## 2019-07-04 PROCEDURE — 84550 ASSAY OF BLOOD/URIC ACID: CPT

## 2019-07-04 PROCEDURE — 99233 PR SUBSEQUENT HOSPITAL CARE,LEVL III: ICD-10-PCS | Mod: ,,, | Performed by: ORTHOPAEDIC SURGERY

## 2019-07-04 PROCEDURE — 82550 ASSAY OF CK (CPK): CPT

## 2019-07-04 PROCEDURE — 82607 VITAMIN B-12: CPT

## 2019-07-04 PROCEDURE — 97161 PT EVAL LOW COMPLEX 20 MIN: CPT

## 2019-07-04 PROCEDURE — 99233 SBSQ HOSP IP/OBS HIGH 50: CPT | Mod: ,,, | Performed by: ORTHOPAEDIC SURGERY

## 2019-07-04 PROCEDURE — 80053 COMPREHEN METABOLIC PANEL: CPT

## 2019-07-04 PROCEDURE — 87040 BLOOD CULTURE FOR BACTERIA: CPT

## 2019-07-04 PROCEDURE — 83874 ASSAY OF MYOGLOBIN: CPT

## 2019-07-04 PROCEDURE — 82085 ASSAY OF ALDOLASE: CPT

## 2019-07-04 PROCEDURE — 80048 BASIC METABOLIC PNL TOTAL CA: CPT

## 2019-07-04 PROCEDURE — 85652 RBC SED RATE AUTOMATED: CPT

## 2019-07-04 PROCEDURE — 87205 SMEAR GRAM STAIN: CPT

## 2019-07-04 PROCEDURE — 20600001 HC STEP DOWN PRIVATE ROOM

## 2019-07-04 PROCEDURE — 25000003 PHARM REV CODE 250: Performed by: HOSPITALIST

## 2019-07-04 PROCEDURE — 36415 COLL VENOUS BLD VENIPUNCTURE: CPT

## 2019-07-04 PROCEDURE — 87116 MYCOBACTERIA CULTURE: CPT

## 2019-07-04 RX ORDER — MORPHINE SULFATE 2 MG/ML
4 INJECTION, SOLUTION INTRAMUSCULAR; INTRAVENOUS ONCE
Status: DISCONTINUED | OUTPATIENT
Start: 2019-07-04 | End: 2019-07-04

## 2019-07-04 RX ORDER — IBUPROFEN 400 MG/1
400 TABLET ORAL EVERY 6 HOURS PRN
Status: DISCONTINUED | OUTPATIENT
Start: 2019-07-04 | End: 2019-07-04

## 2019-07-04 RX ORDER — HYDROMORPHONE HYDROCHLORIDE 1 MG/ML
1 INJECTION, SOLUTION INTRAMUSCULAR; INTRAVENOUS; SUBCUTANEOUS EVERY 6 HOURS PRN
Status: DISCONTINUED | OUTPATIENT
Start: 2019-07-04 | End: 2019-07-07

## 2019-07-04 RX ORDER — MORPHINE SULFATE 2 MG/ML
4 INJECTION, SOLUTION INTRAMUSCULAR; INTRAVENOUS
Status: DISCONTINUED | OUTPATIENT
Start: 2019-07-04 | End: 2019-07-07

## 2019-07-04 RX ORDER — HYDROMORPHONE HYDROCHLORIDE 1 MG/ML
1 INJECTION, SOLUTION INTRAMUSCULAR; INTRAVENOUS; SUBCUTANEOUS ONCE
Status: COMPLETED | OUTPATIENT
Start: 2019-07-04 | End: 2019-07-04

## 2019-07-04 RX ORDER — MORPHINE SULFATE 2 MG/ML
3 INJECTION, SOLUTION INTRAMUSCULAR; INTRAVENOUS ONCE
Status: DISCONTINUED | OUTPATIENT
Start: 2019-07-04 | End: 2019-07-04

## 2019-07-04 RX ORDER — MIDAZOLAM HYDROCHLORIDE 1 MG/ML
1 INJECTION INTRAMUSCULAR; INTRAVENOUS
Status: DISCONTINUED | OUTPATIENT
Start: 2019-07-04 | End: 2019-07-04

## 2019-07-04 RX ORDER — DOBUTAMINE HYDROCHLORIDE 400 MG/100ML
2.5 INJECTION, SOLUTION INTRAVENOUS CONTINUOUS
Status: DISCONTINUED | OUTPATIENT
Start: 2019-07-04 | End: 2019-07-05

## 2019-07-04 RX ORDER — MIDAZOLAM HYDROCHLORIDE 1 MG/ML
1 INJECTION INTRAMUSCULAR; INTRAVENOUS
Status: DISCONTINUED | OUTPATIENT
Start: 2019-07-04 | End: 2019-07-05

## 2019-07-04 RX ORDER — HYDROMORPHONE HYDROCHLORIDE 1 MG/ML
0.5 INJECTION, SOLUTION INTRAMUSCULAR; INTRAVENOUS; SUBCUTANEOUS EVERY 6 HOURS PRN
Status: DISCONTINUED | OUTPATIENT
Start: 2019-07-04 | End: 2019-07-07

## 2019-07-04 RX ORDER — FUROSEMIDE 10 MG/ML
160 INJECTION INTRAMUSCULAR; INTRAVENOUS ONCE
Status: COMPLETED | OUTPATIENT
Start: 2019-07-04 | End: 2019-07-04

## 2019-07-04 RX ADMIN — PIPERACILLIN AND TAZOBACTAM 4.5 G: 4; .5 INJECTION, POWDER, LYOPHILIZED, FOR SOLUTION INTRAVENOUS; PARENTERAL at 05:07

## 2019-07-04 RX ADMIN — MORPHINE SULFATE 4 MG: 2 INJECTION, SOLUTION INTRAMUSCULAR; INTRAVENOUS at 10:07

## 2019-07-04 RX ADMIN — FUROSEMIDE 160 MG: 10 INJECTION, SOLUTION INTRAMUSCULAR; INTRAVENOUS at 12:07

## 2019-07-04 RX ADMIN — LIDOCAINE 3 PATCH: 50 PATCH TOPICAL at 10:07

## 2019-07-04 RX ADMIN — PIPERACILLIN AND TAZOBACTAM 4.5 G: 4; .5 INJECTION, POWDER, LYOPHILIZED, FOR SOLUTION INTRAVENOUS; PARENTERAL at 01:07

## 2019-07-04 RX ADMIN — HYDROMORPHONE HYDROCHLORIDE 1 MG: 1 INJECTION, SOLUTION INTRAMUSCULAR; INTRAVENOUS; SUBCUTANEOUS at 08:07

## 2019-07-04 RX ADMIN — PANTOPRAZOLE SODIUM 40 MG: 40 TABLET, DELAYED RELEASE ORAL at 10:07

## 2019-07-04 RX ADMIN — IBUPROFEN 400 MG: 400 TABLET, FILM COATED ORAL at 05:07

## 2019-07-04 RX ADMIN — MAGNESIUM OXIDE TAB 400 MG (241.3 MG ELEMENTAL MG) 400 MG: 400 (241.3 MG) TAB at 10:07

## 2019-07-04 RX ADMIN — VANCOMYCIN HYDROCHLORIDE 2250 MG: 1 INJECTION, POWDER, LYOPHILIZED, FOR SOLUTION INTRAVENOUS at 10:07

## 2019-07-04 RX ADMIN — MORPHINE SULFATE 4 MG: 2 INJECTION, SOLUTION INTRAMUSCULAR; INTRAVENOUS at 03:07

## 2019-07-04 RX ADMIN — METOPROLOL SUCCINATE 25 MG: 25 TABLET, EXTENDED RELEASE ORAL at 10:07

## 2019-07-04 RX ADMIN — FUROSEMIDE 10 MG/HR: 10 INJECTION, SOLUTION INTRAMUSCULAR; INTRAVENOUS at 05:07

## 2019-07-04 RX ADMIN — ASPIRIN 81 MG: 81 TABLET, COATED ORAL at 10:07

## 2019-07-04 RX ADMIN — DOBUTAMINE IN DEXTROSE 2.5 MCG/KG/MIN: 200 INJECTION, SOLUTION INTRAVENOUS at 05:07

## 2019-07-04 NOTE — HPI
54 y/o M with PMH combined CHF (last Echo 4/2019 with EF 23%, HTN, chronic A. fib (on coumadin), A. flutter s/p ablation, CVA (in 2009) with no residual deficits, CAD, non-compliance, and drug seeking behavior presents with 6 day history of R sided neck, sternoclavicular, trapezius musculature, anterior and posterior shoulder pain as well as acute onset RLE weakness and numbness of the R foot. Patient reports he was walking in his room 6 days prior when all of these upper and lower extremity symptoms started abruptly. He reports being able to fall into his bed since he lost balance due to R foot weakness. He denies landing hard or injuring anything 2/2 fall into bed. He reports diffuse pain with movement of his RUE in the locations stated above. He reports complete inability to move his RLE below the waist. He also reports complete lack of sensation to his entire R foot below the ankle. He denies pain, numbness, tingling or weakness elsewhere. He denies bowel or bladder changes, perianal sensory changes. Patient had fever to 101.6 last night, presented with ESR >120, CRP 25.9, normal white count. Medicine is managing for apparent CHF exacerbation currently and has no known source for fever and elevated inflammatory markers. He is on warfarin for a-fib, INR today is 1.3.

## 2019-07-04 NOTE — PLAN OF CARE
Problem: Physical Therapy Goal  Goal: Physical Therapy Goal  Goals to be met by: 2019     Patient will increase functional independence with mobility by performin. Supine to sit with Set-up [place]  2. Sit to supine with Set-up [place]  3. Sit to stand transfer with Stand-by Assistance  4. Bed to chair transfer with Stand-by Assistance using Rolling Walker  5. Gait  x 30 feet with Contact Guard Assistance using Rolling Walker.   6. Lower extremity exercise program x20 reps per handout, with assistance as needed    PT giovani. Maren Hallman, PT  2019

## 2019-07-04 NOTE — PLAN OF CARE
Problem: Adult Inpatient Plan of Care  Goal: Plan of Care Review  Outcome: Ongoing (interventions implemented as appropriate)  Will continue to monitor pt's safety, and pain level.

## 2019-07-04 NOTE — SUBJECTIVE & OBJECTIVE
Past Medical History:   Diagnosis Date    Anticoagulant long-term use     Cardiomegaly     Chronic combined systolic and diastolic congestive heart failure     Coronary artery disease     Heart attack 2006    Hypertension     Hyperthyroidism, subclinical 1/2/2013    MI (myocardial infarction) 9/22/2013    MI in 2009      Paroxysmal atrial fibrillation     PE (pulmonary embolism) 1/1/2013    IN 2010     S/P ablation of atrial flutter 2008    Stroke 2009    no residual weaknesses       Past Surgical History:   Procedure Laterality Date    RADIOFREQUENCY ABLATION  01/08/2008    for atrial flutter       Review of patient's allergies indicates:   Allergen Reactions    Acetaminophen      Itching    Oxycodone-acetaminophen      Other reaction(s): Itching    Ace inhibitors Other (See Comments)     cough       Current Facility-Administered Medications   Medication    albuterol inhaler 1 puff    aspirin EC tablet 81 mg    calcium carbonate 200 mg calcium (500 mg) chewable tablet 500 mg    diphenhydrAMINE capsule 25 mg    DOBUTamine 500mg in D5W 250mL infusion (premix) (NON-TITRATING)    furosemide (LASIX) 2 mg/mL in sodium chloride 0.9% 100 mL infusion (conc: 2 mg/mL)    lidocaine 5 % patch 3 patch    magnesium oxide tablet 400 mg    metoprolol succinate (TOPROL-XL) 24 hr tablet 25 mg    morphine injection 4 mg    nitroGLYCERIN SL tablet 0.4 mg    ondansetron injection 4 mg    pantoprazole EC tablet 40 mg    piperacillin-tazobactam 4.5 g in sodium chloride 0.9% 100 mL IVPB (ready to mix system)    polyethylene glycol packet 17 g    ramelteon tablet 8 mg    sodium chloride 0.9% flush 10 mL    sodium chloride 0.9% flush 10 mL     Family History     Problem Relation (Age of Onset)    Alcohol abuse Father    Hypertension Mother, Father, Sister, Brother    Stroke Mother        Tobacco Use    Smoking status: Never Smoker    Smokeless tobacco: Never Used   Substance and Sexual Activity    Alcohol  "use: No    Drug use: No    Sexual activity: Never     ROS See Primary Provider ROS  Objective:     Vital Signs (Most Recent):  Temp: 98 °F (36.7 °C) (07/04/19 1140)  Pulse: 101 (07/04/19 1140)  Resp: 18 (07/04/19 1140)  BP: (!) 108/58 (07/04/19 1140)  SpO2: 100 % (07/04/19 1140) Vital Signs (24h Range):  Temp:  [98 °F (36.7 °C)-101.6 °F (38.7 °C)] 98 °F (36.7 °C)  Pulse:  [] 101  Resp:  [17-18] 18  SpO2:  [93 %-100 %] 100 %  BP: ()/(42-68) 108/58     Weight: 118.4 kg (261 lb)  Height: 5' 9" (175.3 cm)  Body mass index is 38.54 kg/m².      Intake/Output Summary (Last 24 hours) at 7/4/2019 1249  Last data filed at 7/4/2019 1230  Gross per 24 hour   Intake 240 ml   Output 175 ml   Net 65 ml       Ortho/SPM Exam    Afebrile, Vital signs stable   Gen - well-developed, well-nourished, no acute distress  HEENT - normocephalic, atraumatic   CV - Regular rate   Pulm - Good inspiratory effort with unlaboured breathing  Abd -  non-tender, non-distended    PHYSICAL EXAM:  Awake/Alert/Oriented x3, No acute distress, Afebrile, Vital signs stable  Normocephalic, atraumatic  Palpable distal pulses  Good inspiratory effort with unlaboured breathing    RUE  Skin intact, no deformity noted  No open wounds/abrasions/laceration  Significant TTP over R neck, R sterno clavicular joint, R clavicle. R shoulder diffusely, and R trapezius  Pain in all of these locations with ROM of R shoulder  Able to actively abduct to 30 degrees before pain limited  Able to passively abduct and flex to 90 deg with pain  SILT M/U/R  Motor intact AIN/PIN/M/U/R   Cap refill < 2s  2+ RP    RLE  Skin intact  Significant chronic venous stasis changes of BLE  No TTP  Compartments soft  Painless full passive ROM of R hip, knee, ankle  SILT Sa/Olsen/DP/SP/T  0/5 Motor Illiopsoas, quadriceps, EHL/FHL/TA/Gastroc  2+ DP, 2+ PT      Spine exam  No lesions, erythema, or skin breakdown notes over spine  Diffuse TTP along midline spine from cervical to lumbar " spine including paraspinal musculature on right as well as right scapular region  No palpable stepoffs or or fluctuance noted.    Motor            RIGHT  LEFT  Deltoid(C5)        5/5    5/5  Biceps(C5)         5/5    5/5  Extensor Carpi Radialis Longus(C6)    5/5    5/5   Triceps(C7)       5/5    5/5     Flexor Carpi Radialis(C7)     5/5    5/5  Flexor Digitorum Superficialis(C8)    5/5    5/5  Interossei(T1)       5/5    5/5     Sensation (a=absent, i=impaired, n=normal)       RIGHT  LEFT  C5 dermatome       n     n  C6 dermatome       n     n  C7 dermatome       n     n  C8 dermatome       n     n  T1 dermatome       n     n    Reflexes       RIGHT  LEFT  Triceps        2+     2+  Biceps         2+     2+  Brachioradialis       2+           2+  Hoffmans's       neg    neg      Motor             RIGHT  LEFT  Iliopsoas (L2,3)       0/5    5/5  Quadriceps (L3,4)      0/5    5/5   Tibialis Anterior (L4.5)         0/5    5/5   Extensor Halucis Longus (L5)    0/5    5/5     Gastrocnemius/Soleus (S1)     0/5    5/5  Flexor Hallucis Longus(S2)     0/5    5/5    Sensation (a=absent, i=impaired, n=normal)       RIGHT   LEFT  L2 dermatome                  n     n  L3 dermatome                  n     n  L4 dermatome                  n     n  L5 dermatome     Ab  Normal (foot only   n  S1 dermatome   Ab normal (foot only)   n  S2 dermatome       n     n    Reflexes        RIGHT  LEFT  Patellar       2+     2+  Achilles       2+     2+  Babinski      neg    neg  Clonus          neg    neg        Significant Labs:   CBC:   Recent Labs   Lab 07/02/19 2215 07/04/19  0429   WBC 7.62 10.10   HGB 8.7* 7.4*   HCT 29.5* 25.5*    288     CRP:   Recent Labs   Lab 07/02/19 2215 07/04/19  1158   CRP 25.9* 87.1*     ESR >120  CRP trending up as above    All pertinent labs within the past 24 hours have been reviewed.    Significant Imaging: I have reviewed and interpreted all pertinent imaging results/findings.   XR R shoulder shows  no acute fx or dislocation  MRI Brain and C/T/L spine does not show significant spinal canal stenosis or neuro foraminal stenosis

## 2019-07-04 NOTE — NURSING TRANSFER
Nursing Transfer Note      7/4/2019     Transfer from observationn    Transfer with wheelchair    Transfer with telemetry    Medicines sent: n/a    Chart send with patient: yes    Patient reassessed at: 7/4/19 1700  Upon arrival to floor:started lasix,  and abt drip oreinted to room v/s taken

## 2019-07-04 NOTE — PROGRESS NOTES
[facility] Hospital Medicine  Progress Note    Patient Name: Hamlet Terrell  MRN: [id number]  Patient Class: IP- Inpatient   Admission Date: 7/2/2019  Length of Stay: 0 days  Attending Physician: José Luis Langford MD  Primary Care Provider: Carlin Swift MD    [facility] Medicine Team: [facility] – [place], [place] HOSP MED A José Luis Langford MD    Subjective:     Principal Problem:Acute on chronic combined systolic and diastolic heart failure    HPI:   52 y/o M with PMH combined CHF (last Echo 4/2019 with EF 23%, diastolic dysfuction, moderated MR and TR), HTN, chronic A. fib (on coumadin), A. flutter s/p ablation, CVA (in 2009), CAD, ?PE (patient reported), non-compliance, and drug seeking behavior presents c/o of right shoulder/arm pain following an injury from right leg weakness. Patient reports waking up to go to the restroom during the night approximately 4 days ago when he was unable to bare weight on right leg due to weakness. He attempted to get back in the bed and aggravated/injured his right shoulder climbing back into bed. He was hoping the pain would resolve on its own but it has persisted for past 4 days prompting him to come to ED. He reports he can move his right upper extremity but refuses to because he does not want to illicit the pain.  Pt denies fever, chills, appetite change, wt change, CP, SOB (at baseline, 2 pillow orthopnea), palpitations, increased leg swelling (chronic leg swelling and venous stasis changes), N/V/D, confusion, slurred speech, dysphagia, seizures, tremors, syncope.     Through chart review, patient seen in ED for various pain complaints in the past.      On 4/6/17 per Dr. Garza's note: chronic LE heel/ankle pain that initially was thought secondary to LE swelling, and possibly gout but as swelling decreased and no improvement with colchicine.  Pt initially receiving opiates, but this was titrated down and patient was started on Gabapentin, at 400mg BID.  MRI revealed no  structural abnormality, there was concern for possible left heel bursitis.  However pain appears out of proportion to exam and findings. Would not recommend any chronic opiate treatment for this pain.      Per discharge note on 4/24/19, patient did ask for a pain medication prescription on discharge, which he does not have an indication for.       On last hospital admission (5/14/19 - 5/17/19): Mr. Terrell's hospitalization was complicated by aggressive behavior and agitation, including threatening remarks made against provider. Patient complained of chest pain, workup for ACS was negative, and the pain improved with GI medications, which is consistent with the etiology of either hearburn/GERD or esophagitis. He was recommended to utilize a PPI and either Maalox or carafate, but on discussing this issue, he continued to escalate his behavior and consistently request narcotic pain medication. Patient threatening to return to ED if discharged. Security was present during discharge counseling session due to the threats.      In 2018, patient prescribed Raven by 4 different prescribers at various times. Last opioid prescription written in May 2019 by an ER physician.     In the ED: Afebrile without leukocytosis on arrival. Hypertensive at 140s-150s/80s. Hgb 8.7 (baseline). ESR elevated >120, CRP 25.9. INR non-therapeutic (1.2). EKG with A. Fib. Alk Phos 236, bili 1.8. Tox screen positive for benzos and opiates. UA unremarkable. CT head without acute intracranial abnormalities. R shoulder XR with no acute fracture. MRI brain with chronic infarcts. Cervical, lumbar, and thoracic spine MRI limited due to artifact but showed chronic changes and C5-6 level demonstrates appearance of disc osteophyte disease mild anterior impression upon the dural sac without high-grade stenosis.  Foraminal evaluation is limited, there is suggestion of foraminal encroachment on the right.  Correlation for exiting nerve root symptomatology is  "needed.     Hospital Course:   Patient completed MRI Brain and MRI entire spine that did not reveal acute finding to explain patient's unilateral symptoms.  Pt does have some abnormalities on spinal MRI as per report and HPI.     Interval History: Patient seen yesterday with admitted AYESHA Khan, for repeat exam given patients pain complaints.   Most notable yesterday that patient not moving RUE due to severity of pain and he states "I can't move it"   He also was unable to move RLE and could not tolerate attempts at passive ROM.     Of note patient had compression stockings placed by nursing on admission, and on palpation patient with 4+pitting edema throughout lower leg and extending to dependent areas of thighs.   He was given a dose of 160mg IV lasix yesterday afternoon.   I/O not charted at all for night shift and during daytime only 150cc uop charted, discussed with RN and she did not receive any specific information in report regarding how many voids or if patient with diuretic response to this dose.  Patient on interview today denies that he had any increased urine output.     Also overnight patient with some borderline hypotension and new febrile temperatures. He was given Ibuprofen x 1 due to listed Acetaminophen allergy.  Patient states the Ibuprofen helped his pain more than the morphine and oxycodone he's received.  But also patient's Creatinine priti today     Multiple concerns likely separate diagnoses.   Given inability to tolerate Range of motion and reports of severe pain in right shoulder - Will Consult Orthopedic Surgery to assist     Discussed cardiac issues with cardiology fellow, verbal recs     Review of Systems   Constitutional: Positive for fever. Negative for chills and diaphoresis.   HENT: Negative for sore throat.    Eyes: Negative for visual disturbance.   Respiratory: Negative for shortness of breath.    Cardiovascular: Positive for leg swelling. Negative for chest pain and palpitations. "   Gastrointestinal: Negative for abdominal pain, constipation and diarrhea.   Skin: Negative for color change.     Objective:     Vital Signs (Most Recent):  Temp: 98.2 °F (36.8 °C) (07/04/19 1020)  Pulse: 65 (07/04/19 1020)  Resp: 18 (07/04/19 1020)  BP: (!) 113/55 (07/04/19 1020)  SpO2: 100 % (07/04/19 1020) Vital Signs (24h Range):  Temp:  [98 °F (36.7 °C)-101.6 °F (38.7 °C)] 98.2 °F (36.8 °C)  Pulse:  [] 65  Resp:  [17-18] 18  SpO2:  [93 %-100 %] 100 %  BP: ()/(42-68) 113/55     Weight: 118.4 kg (261 lb)  Body mass index is 38.54 kg/m².    Intake/Output Summary (Last 24 hours) at 7/4/2019 1114  Last data filed at 7/3/2019 1700  Gross per 24 hour   Intake 480 ml   Output 150 ml   Net 330 ml      Physical Exam   Constitutional:   Mild distress - reported left hamstring cramp on eval   HENT:   Mouth/Throat: No oropharyngeal exudate.   Eyes: Pupils are equal, round, and reactive to light. No scleral icterus.   Neck:   Difficult to note JVP, due to beard/large neck, also upright position   Cardiovascular:   Irregular rhythm,  4+ Pitting edema to knee and dependent portion of thighs   Pulmonary/Chest: Effort normal and breath sounds normal. No stridor. No respiratory distress. He has no wheezes.   Abdominal: Soft. Bowel sounds are normal. He exhibits no distension. There is no tenderness.   Musculoskeletal: He exhibits edema.   RUE - tricep ext 3/5,  Bicep flex 3/5,   5/5, wrist flex 4/5, wrist ext 4/5, ulnar/radial deviation 5/5.   Shoulder flexion - 0/5, unable to     RLE hip flexion 0/5   Lymphadenopathy:     He has no cervical adenopathy.   Neurological:   Reflex Scores:       Bicep reflexes are 0 on the right side and 2+ on the left side.       Brachioradialis reflexes are 0 on the right side and 2+ on the left side.  Skin: Skin is warm and dry.       Significant Labs:   CMP:   Recent Labs   Lab 07/02/19  2215 07/04/19  0429    136   K 4.0 4.5    100   CO2 24 25   GLU 87 87   BUN 14  21*   CREATININE 1.3 1.7*   CALCIUM 9.9 9.3   PROT 9.2* 8.1   ALBUMIN 3.6 3.1*   BILITOT 1.8* 2.3*   ALKPHOS 236* 187*   AST 18 15   ALT 9* 5*   ANIONGAP 14 11   EGFRNONAA >60.0 45.4*     Cardiac Markers:   Recent Labs   Lab 07/03/19  1441   *     Lactic Acid:   Recent Labs   Lab 07/04/19  0553   LACTATE 1.1     Results for DIANN BARRERA (MRN [id number]) as of 7/4/2019 11:09   Ref. Range 7/4/2019 04:29   Uric Acid Latest Ref Range: 3.4 - 7.0 mg/dL 12.1 (H)     Results for DIANN BARRERA (MRN [id number]) as of 7/4/2019 11:09   Ref. Range 7/2/2019 22:15   Sed Rate Latest Ref Range: 0 - 23 mm/Hr >120 (H)       Significant Imaging: I have reviewed all pertinent imaging results/findings within the past 24 hours.    Assessment/Plan:      Acute on Chronic Combined systolic/diastolic heart failure   -concerns due to diffuse edema and vol overload in BLE, BNP elevated and lack of response to 160mg IV dose of lasix given yesterday, now patient with Rising bilirubin, and Rising Creatinine   -Repeat ECHO ordered pending, completed in last 6 mos but that reflected acute congestion  -discussed via phone with cardiology - Will challenge with another high dose lasix 160mg x 1, start lasix drip @ 10mg/hr  -admit to inpatient and transfer orders place d  -When pt in stepdown unit - Dobutamine 2.5mcg/kg/min      Right sided weakness & acute pain   -etiology remains unclear, s/p MRI Brain/C/T/L spine  -Patient has chronic elevation of Uric Acid, prior synovial fluid studies negative for gout/pseudogout crystals   -    Febrile ILlness  -procal elevated, blood cultures drawn  -initial UA w/o signs of infection  -CXR w/o signs of new infiltrate  -on Vanc & Zosyn empirically overnight  -normal lactic acid and WBC ct    Atrial Fibrillation   -rate controlled on toprol   telemetry    JEAN-PIERRE on CKD   -see heart failure  -Urine studies ordered and pending  -bladder scan, if retaining will need monroe catheter  -may also need monroe     Hx of  VTE  -Coumadin @6mg   -subtherapeutic  -if no orthopedic surgery procedures needed, restart today    Anemia of iron deficiency  -normal ferritin but low serum iron and saturation on April studies  -send type & screen     Active Diagnoses:    Diagnosis Date Noted POA    PRINCIPAL PROBLEM:  Acute on chronic combined systolic and diastolic heart failure [I50.43] 11/18/2013 Yes    Acute pain of right shoulder due to trauma [M25.511, G89.11] 07/03/2019 Yes    Atrial fibrillation, chronic [I48.2] 01/02/2013 Yes     Chronic    Weakness of lower extremity [R29.898] 07/03/2019 Yes    Essential hypertension [I10] 01/02/2013 Yes     Chronic    Personal history of venous thrombosis and embolism [Z86.718] 09/22/2013 Not Applicable    Chronic anticoagulation [Z79.01] 01/01/2013 Not Applicable     Chronic    Chronic kidney disease [N18.9] 05/31/2016 Yes     Chronic    Lumbar back pain [M54.5] 07/06/2015 Yes    Venous stasis of lower extremity [I87.8] 05/15/2019 Yes    Neuropathy of both feet [G57.93] 07/03/2019 Yes    Prediabetes [R73.03] 07/03/2019 Yes    Elevated alkaline phosphatase level [R74.8] 12/10/2016 Yes    Non compliance w medication regimen [Z91.14] 01/01/2013 Not Applicable     Chronic    History of CVA (cerebrovascular accident) [Z86.73] 01/01/2013 Not Applicable      Problems Resolved During this Admission:     VTE Risk Mitigation (From admission, onward)        Ordered     warfarin tablet 6 mg  Daily      07/03/19 1311     IP VTE HIGH RISK PATIENT  Once      07/03/19 0955     Place sequential compression device  Until discontinued      07/03/19 0955     Place CAMI hose  Until discontinued      07/03/19 0841             José Luis Langford MD  Department of Hospital Medicine   [facility]

## 2019-07-04 NOTE — ASSESSMENT & PLAN NOTE
Pt is a 54 yo M with severe CHF who presents with acute onset of RUE pain, total RLE weakness, and R foot numbness. MRI of CTL spine do not indicate source of neuro deficits. No cord or foraminal compression seen. Upper extremity pain is diffuse and not limited to the shoulder joint. Significant TTP with mild palpation diffusely including over neck and trapezius musculature. Patient seems to have significant history of drug seeking behavior in the past including threatening comments. States if I stick a needle in his shoulder he would have to be held down and he would not know what he would do to me. ESR and CRP are elevated and up trending and patient had fever to 101.6 overnight. Will order MRI of shoulder to evaluate for any effusion indicative of possible septic joint. Otherwise neurologic symptoms do not appear to be coming from spine. Would recommend neurology consult for possible peripheral neuropathy vs psychogenic component.

## 2019-07-04 NOTE — CARE UPDATE
Patient arrived to MRI on tele monitor. Patient unable to reposition to allow for MRI. Nurse notified of above and patient sent back to floor.

## 2019-07-04 NOTE — CONSULTS
"[facility]  Orthopedics  Consult Note    Patient Name: Hamlet Terrell  MRN: [id number]  Admission Date: 7/2/2019  Hospital Length of Stay: 0 days  Attending Provider: José Luis Langford MD  Primary Care Provider: Carlin Swift MD    Patient information was obtained from patient and ER records.     Inpatient consult to Orthopedic Surgery  Consult performed by: oJnatan Gaines MD  Consult ordered by: José Luis Langford MD        Subjective:     Principal Problem:Acute on chronic combined systolic and diastolic heart failure    Chief Complaint:   Chief Complaint   Patient presents with    Arm Pain     Pt reports right sided face/arm/leg pain, headache, tingling to right side X4 days. Pt states "I can't move any of it because it hurts, I just woke up and it started hurting" when asked to hold right side up. Pt denies unilateral weakness, recent falls or trauma. Pt denies taking any pain medication.         HPI: 52 y/o M with PMH combined CHF (last Echo 4/2019 with EF 23%, HTN, chronic A. fib (on coumadin), A. flutter s/p ablation, CVA (in 2009) with no residual deficits, CAD, non-compliance, and drug seeking behavior presents with 6 day history of R sided neck, sternoclavicular, trapezius musculature, anterior and posterior shoulder pain as well as acute onset RLE weakness and numbness of the R foot. Patient reports he was walking in his room 6 days prior when all of these upper and lower extremity symptoms started abruptly. He reports being able to fall into his bed since he lost balance due to R foot weakness. He denies landing hard or injuring anything 2/2 fall into bed. He reports diffuse pain with movement of his RUE in the locations stated above. He reports complete inability to move his RLE below the waist. He also reports complete lack of sensation to his entire R foot below the ankle. He denies pain, numbness, tingling or weakness elsewhere. He denies bowel or bladder changes, perianal " sensory changes. Patient had fever to 101.6 last night, presented with ESR >120, CRP 25.9, normal white count. Medicine is managing for apparent CHF exacerbation currently and has no known source for fever and elevated inflammatory markers. He is on warfarin for a-fib, INR today is 1.3.     Past Medical History:   Diagnosis Date    Anticoagulant long-term use     Cardiomegaly     Chronic combined systolic and diastolic congestive heart failure     Coronary artery disease     Heart attack 2006    Hypertension     Hyperthyroidism, subclinical 1/2/2013    MI (myocardial infarction) 9/22/2013    MI in 2009      Paroxysmal atrial fibrillation     PE (pulmonary embolism) 1/1/2013    IN 2010     S/P ablation of atrial flutter 2008    Stroke 2009    no residual weaknesses       Past Surgical History:   Procedure Laterality Date    RADIOFREQUENCY ABLATION  01/08/2008    for atrial flutter       Review of patient's allergies indicates:   Allergen Reactions    Acetaminophen      Itching    Oxycodone-acetaminophen      Other reaction(s): Itching    Ace inhibitors Other (See Comments)     cough       Current Facility-Administered Medications   Medication    albuterol inhaler 1 puff    aspirin EC tablet 81 mg    calcium carbonate 200 mg calcium (500 mg) chewable tablet 500 mg    diphenhydrAMINE capsule 25 mg    DOBUTamine 500mg in D5W 250mL infusion (premix) (NON-TITRATING)    furosemide (LASIX) 2 mg/mL in sodium chloride 0.9% 100 mL infusion (conc: 2 mg/mL)    lidocaine 5 % patch 3 patch    magnesium oxide tablet 400 mg    metoprolol succinate (TOPROL-XL) 24 hr tablet 25 mg    morphine injection 4 mg    nitroGLYCERIN SL tablet 0.4 mg    ondansetron injection 4 mg    pantoprazole EC tablet 40 mg    piperacillin-tazobactam 4.5 g in sodium chloride 0.9% 100 mL IVPB (ready to mix system)    polyethylene glycol packet 17 g    ramelteon tablet 8 mg    sodium chloride 0.9% flush 10 mL    sodium  "chloride 0.9% flush 10 mL     Family History     Problem Relation (Age of Onset)    Alcohol abuse Father    Hypertension Mother, Father, Sister, Brother    Stroke Mother        Tobacco Use    Smoking status: Never Smoker    Smokeless tobacco: Never Used   Substance and Sexual Activity    Alcohol use: No    Drug use: No    Sexual activity: Never     ROS See Primary Provider ROS  Objective:     Vital Signs (Most Recent):  Temp: 98 °F (36.7 °C) (07/04/19 1140)  Pulse: 101 (07/04/19 1140)  Resp: 18 (07/04/19 1140)  BP: (!) 108/58 (07/04/19 1140)  SpO2: 100 % (07/04/19 1140) Vital Signs (24h Range):  Temp:  [98 °F (36.7 °C)-101.6 °F (38.7 °C)] 98 °F (36.7 °C)  Pulse:  [] 101  Resp:  [17-18] 18  SpO2:  [93 %-100 %] 100 %  BP: ()/(42-68) 108/58     Weight: 118.4 kg (261 lb)  Height: 5' 9" (175.3 cm)  Body mass index is 38.54 kg/m².      Intake/Output Summary (Last 24 hours) at 7/4/2019 1249  Last data filed at 7/4/2019 1230  Gross per 24 hour   Intake 240 ml   Output 175 ml   Net 65 ml       Ortho/SPM Exam    Afebrile, Vital signs stable   Gen - well-developed, well-nourished, no acute distress  HEENT - normocephalic, atraumatic   CV - Regular rate   Pulm - Good inspiratory effort with unlaboured breathing  Abd -  non-tender, non-distended    PHYSICAL EXAM:  Awake/Alert/Oriented x3, No acute distress, Afebrile, Vital signs stable  Normocephalic, atraumatic  Palpable distal pulses  Good inspiratory effort with unlaboured breathing    RUE  Skin intact, no deformity noted  No open wounds/abrasions/laceration  Significant TTP over R neck, R sterno clavicular joint, R clavicle. R shoulder diffusely, and R trapezius  Pain in all of these locations with ROM of R shoulder  Able to actively abduct to 30 degrees before pain limited  Able to passively abduct and flex to 90 deg with pain  SILT M/U/R  Motor intact AIN/PIN/M/U/R   Cap refill < 2s  2+ RP    RLE  Skin intact  Significant chronic venous stasis changes of " BLE  No TTP  Compartments soft  Painless full passive ROM of R hip, knee, ankle  SILT Sa/Olsen/DP/SP/T  0/5 Motor Illiopsoas, quadriceps, EHL/FHL/TA/Gastroc  2+ DP, 2+ PT      Spine exam  No lesions, erythema, or skin breakdown notes over spine  Diffuse TTP along midline spine from cervical to lumbar spine including paraspinal musculature on right as well as right scapular region  No palpable stepoffs or or fluctuance noted.    Motor            RIGHT  LEFT  Deltoid(C5)        5/5    5/5  Biceps(C5)         5/5    5/5  Extensor Carpi Radialis Longus(C6)    5/5    5/5   Triceps(C7)       5/5    5/5     Flexor Carpi Radialis(C7)     5/5    5/5  Flexor Digitorum Superficialis(C8)    5/5    5/5  Interossei(T1)       5/5    5/5     Sensation (a=absent, i=impaired, n=normal)       RIGHT  LEFT  C5 dermatome       n     n  C6 dermatome       n     n  C7 dermatome       n     n  C8 dermatome       n     n  T1 dermatome       n     n    Reflexes       RIGHT  LEFT  Triceps        2+     2+  Biceps         2+     2+  Brachioradialis       2+           2+  Hoffmans's       neg    neg      Motor             RIGHT  LEFT  Iliopsoas (L2,3)       0/5    5/5  Quadriceps (L3,4)      0/5    5/5   Tibialis Anterior (L4.5)         0/5    5/5   Extensor Halucis Longus (L5)    0/5    5/5     Gastrocnemius/Soleus (S1)     0/5    5/5  Flexor Hallucis Longus(S2)     0/5    5/5    Sensation (a=absent, i=impaired, n=normal)       RIGHT   LEFT  L2 dermatome                  n     n  L3 dermatome                  n     n  L4 dermatome                  n     n  L5 dermatome     Ab  Normal (foot only   n  S1 dermatome   Ab normal (foot only)   n  S2 dermatome       n     n    Reflexes        RIGHT  LEFT  Patellar       2+     2+  Achilles       2+     2+  Babinski      neg    neg  Clonus          neg    neg        Significant Labs:   CBC:   Recent Labs   Lab 07/02/19  2215 07/04/19  0429   WBC 7.62 10.10   HGB 8.7* 7.4*   HCT 29.5* 25.5*    288      CRP:   Recent Labs   Lab 07/02/19  2215 07/04/19  1158   CRP 25.9* 87.1*     ESR >120  CRP trending up as above    All pertinent labs within the past 24 hours have been reviewed.    Significant Imaging: I have reviewed and interpreted all pertinent imaging results/findings.   XR R shoulder shows no acute fx or dislocation  MRI Brain and C/T/L spine does not show significant spinal canal stenosis or neuro foraminal stenosis     Assessment/Plan:     Shoulder pain, right  Pt is a 52 yo M with severe CHF who presents with acute onset of RUE pain, total RLE weakness, and R foot numbness. MRI of CTL spine do not indicate source of neuro deficits. No cord or foraminal compression seen. Upper extremity pain is diffuse and not limited to the shoulder joint. Significant TTP with mild palpation diffusely including over neck and trapezius musculature. Patient seems to have significant history of drug seeking behavior in the past including threatening comments. States if I stick a needle in his shoulder he would have to be held down and he would not know what he would do to me. ESR and CRP are elevated and up trending and patient had fever to 101.6 overnight. Will order MRI of shoulder to evaluate for any effusion indicative of possible septic joint. Otherwise neurologic symptoms do not appear to be coming from spine. Would recommend neurology consult for possible peripheral neuropathy vs psychogenic component. NPO pending MRI.           Jonatan Gaines MD  Orthopedics  [facility]

## 2019-07-04 NOTE — NURSING
Pt left the unit via WC with transportation. Belongings including cell phone sent with pt. Pt transported to room 345A.

## 2019-07-04 NOTE — PT/OT/SLP EVAL
Physical Therapy Evaluation    Patient Name:  Hamlet Terrell   MRN:  [id number]    Recommendations:     Discharge Recommendations:  home with home health   Discharge Equipment Recommendations: walker, rolling, commode(continue to assess needs)   Barriers to discharge: Decreased caregiver support    Assessment:     Hamlet Terrell is a 53 y.o. male admitted with a medical diagnosis of Acute on chronic combined systolic and diastolic heart failure.  He presents with the following impairments/functional limitations:  impaired endurance, impaired sensation, gait instability, impaired functional mobilty, impaired self care skills, decreased lower extremity function, decreased upper extremity function, pain. Patient did not participate well in PT evaluation today, refusing to perform transfers or gait. He reports absent sensation RLE and did not perform active movement of RLE during seated assessment. .    Rehab Prognosis: Good; patient would benefit from acute skilled PT services to address these deficits and reach maximum level of function.    Recent Surgery: * No surgery found *      Plan:     During this hospitalization, patient to be seen 4 x/week to address the identified rehab impairments via gait training, therapeutic activities, therapeutic exercises and progress toward the following goals:    · Plan of Care Expires:  08/03/19    Subjective     Chief Complaint: pain  Patient/Family Comments/goals: none stated  Pain/Comfort:  · Pain Rating 1: 10/10  · Location - Side 1: Bilateral  · Location 1: hand  · Pain Addressed 1: Distraction, Nurse notified  · Pain Rating Post-Intervention 1: 10/10    Patients cultural, spiritual, Christianity conflicts given the current situation:      Living Environment:  Patient lives in [facility] w/o [facility], Reports he was independent prior to 5 days ago. Reports  assisted him to car.  Prior to admission, patients level of function was independent.  Equipment used at home: none.  DME  owned (not currently used): none.  Upon discharge, patient will have assistance from nobody, per patient.    Objective:     Communicated with nurse prior to session.  Patient found up in chair with ([employer] stockings)  upon PT entry to room.    General Precautions: Standard, fall   Orthopedic Precautions:N/A   Braces: N/A     Exams:  · Cognitive Exam:  Patient is oriented to Person, Place, Time and Situation  · Skin Integrity/Edema:      · -       BLE edema  · Sensation: patient reports absent light touch sensation RLE and consistent w/ response.  Intact light touch LLE; inconsistent in responses to light touch assessment trunk, UEs  · RUE ROM: refused assessement, observed moving hand and elbow  · RUE Strength: not assessed  · LUE ROM: WFL  · LUE Strength: WFL  · RLE ROM: WFL  · RLE Strength: no active movement noted  · LLE ROM: WFL  · LLE Strength: WFL grossly    Functional Mobility:  · Patient refused to attempt/perform functional mobility; refused to let PT assist w/ donning slipper sox; refused for PT to reposition chair.   · Nurse arrived, educated patient in low blood pressure, need to participate and improve response to activity to get pain meds.       Therapeutic Activities and Exercises:   patient educated on PT POC; need to participate and improve safety and functional mobility    AM-PAC 6 CLICK MOBILITY  Total Score:12     Patient left up in chair with call button in reach and nurse present.    GOALS:   Multidisciplinary Problems     Physical Therapy Goals        Problem: Physical Therapy Goal    Goal Priority Disciplines Outcome Goal Variances Interventions   Physical Therapy Goal     PT, PT/OT      Description:  Goals to be met by: 2019     Patient will increase functional independence with mobility by performin. Supine to sit with Set-up [place]  2. Sit to supine with Set-up [place]  3. Sit to stand transfer with Stand-by Assistance  4. Bed to chair transfer with Stand-by Assistance  using Rolling Walker  5. Gait  x 30 feet with Contact Guard Assistance using Rolling Walker.   6. Lower extremity exercise program x20 reps per handout, with assistance as needed                      History:     Past Medical History:   Diagnosis Date    Anticoagulant long-term use     Cardiomegaly     Chronic combined systolic and diastolic congestive heart failure     Coronary artery disease     Heart attack 2006    Hypertension     Hyperthyroidism, subclinical 1/2/2013    MI (myocardial infarction) 9/22/2013    MI in 2009      Paroxysmal atrial fibrillation     PE (pulmonary embolism) 1/1/2013    IN 2010     S/P ablation of atrial flutter 2008    Stroke 2009    no residual weaknesses       Past Surgical History:   Procedure Laterality Date    RADIOFREQUENCY ABLATION  01/08/2008    for atrial flutter       Time Tracking:     PT Received On: 07/04/19  PT Start Time: 0959     PT Stop Time: 1012  PT Total Time (min): 13 min     Billable Minutes: Evaluation 13      Maren Hallman, PT  07/04/2019

## 2019-07-04 NOTE — CARE UPDATE
Rapid Response Nurse Chart Check     Chart check completed, abnormal VS noted, bedside RN, Hilario contacted, no concerns   verbalized at this time, instructed to call [phone] for further concerns or assistance.

## 2019-07-05 LAB
ABO + RH BLD: NORMAL
ALBUMIN SERPL BCP-MCNC: 3 G/DL (ref 3.5–5.2)
ALBUMIN SERPL BCP-MCNC: 3.2 G/DL (ref 3.5–5.2)
ALP SERPL-CCNC: 169 U/L (ref 55–135)
ALP SERPL-CCNC: 177 U/L (ref 55–135)
ALT SERPL W/O P-5'-P-CCNC: 7 U/L (ref 10–44)
ALT SERPL W/O P-5'-P-CCNC: 7 U/L (ref 10–44)
ANION GAP SERPL CALC-SCNC: 12 MMOL/L (ref 8–16)
ANION GAP SERPL CALC-SCNC: 14 MMOL/L (ref 8–16)
AST SERPL-CCNC: 16 U/L (ref 10–40)
AST SERPL-CCNC: 19 U/L (ref 10–40)
BASOPHILS # BLD AUTO: 0.02 K/UL (ref 0–0.2)
BASOPHILS NFR BLD: 0.2 % (ref 0–1.9)
BILIRUB SERPL-MCNC: 1.7 MG/DL (ref 0.1–1)
BILIRUB SERPL-MCNC: 1.9 MG/DL (ref 0.1–1)
BLD GP AB SCN CELLS X3 SERPL QL: NORMAL
BUN SERPL-MCNC: 33 MG/DL (ref 6–20)
BUN SERPL-MCNC: 38 MG/DL (ref 6–20)
CALCIUM SERPL-MCNC: 9.2 MG/DL (ref 8.7–10.5)
CALCIUM SERPL-MCNC: 9.4 MG/DL (ref 8.7–10.5)
CHLORIDE SERPL-SCNC: 99 MMOL/L (ref 95–110)
CHLORIDE SERPL-SCNC: 99 MMOL/L (ref 95–110)
CHLORIDE UR-SCNC: 36 MMOL/L (ref 25–200)
CO2 SERPL-SCNC: 23 MMOL/L (ref 23–29)
CO2 SERPL-SCNC: 25 MMOL/L (ref 23–29)
CREAT SERPL-MCNC: 1.9 MG/DL (ref 0.5–1.4)
CREAT SERPL-MCNC: 2 MG/DL (ref 0.5–1.4)
CREAT UR-MCNC: 130 MG/DL (ref 23–375)
DIFFERENTIAL METHOD: ABNORMAL
EOSINOPHIL # BLD AUTO: 0.5 K/UL (ref 0–0.5)
EOSINOPHIL NFR BLD: 5 % (ref 0–8)
ERYTHROCYTE [DISTWIDTH] IN BLOOD BY AUTOMATED COUNT: 16.2 % (ref 11.5–14.5)
EST. GFR  (AFRICAN AMERICAN): 43.1 ML/MIN/1.73 M^2
EST. GFR  (AFRICAN AMERICAN): 45.8 ML/MIN/1.73 M^2
EST. GFR  (NON AFRICAN AMERICAN): 37.3 ML/MIN/1.73 M^2
EST. GFR  (NON AFRICAN AMERICAN): 39.7 ML/MIN/1.73 M^2
GLUCOSE SERPL-MCNC: 139 MG/DL (ref 70–110)
GLUCOSE SERPL-MCNC: 143 MG/DL (ref 70–110)
HCT VFR BLD AUTO: 25 % (ref 40–54)
HGB BLD-MCNC: 7.4 G/DL (ref 14–18)
IMM GRANULOCYTES # BLD AUTO: 0.04 K/UL (ref 0–0.04)
IMM GRANULOCYTES NFR BLD AUTO: 0.4 % (ref 0–0.5)
INR PPP: 1.2 (ref 0.8–1.2)
LYMPHOCYTES # BLD AUTO: 0.8 K/UL (ref 1–4.8)
LYMPHOCYTES NFR BLD: 7.9 % (ref 18–48)
MAGNESIUM SERPL-MCNC: 1.8 MG/DL (ref 1.6–2.6)
MAGNESIUM SERPL-MCNC: 2 MG/DL (ref 1.6–2.6)
MCH RBC QN AUTO: 23.9 PG (ref 27–31)
MCHC RBC AUTO-ENTMCNC: 29.6 G/DL (ref 32–36)
MCV RBC AUTO: 81 FL (ref 82–98)
MONOCYTES # BLD AUTO: 1.2 K/UL (ref 0.3–1)
MONOCYTES NFR BLD: 12 % (ref 4–15)
NEUTROPHILS # BLD AUTO: 7.6 K/UL (ref 1.8–7.7)
NEUTROPHILS NFR BLD: 74.5 % (ref 38–73)
NRBC BLD-RTO: 0 /100 WBC
PLATELET # BLD AUTO: 251 K/UL (ref 150–350)
PMV BLD AUTO: 9.4 FL (ref 9.2–12.9)
POTASSIUM SERPL-SCNC: 3.8 MMOL/L (ref 3.5–5.1)
POTASSIUM SERPL-SCNC: 3.9 MMOL/L (ref 3.5–5.1)
POTASSIUM UR-SCNC: 45 MMOL/L (ref 15–95)
PROT SERPL-MCNC: 7.8 G/DL (ref 6–8.4)
PROT SERPL-MCNC: 8.6 G/DL (ref 6–8.4)
PROTHROMBIN TIME: 12.4 SEC (ref 9–12.5)
RBC # BLD AUTO: 3.1 M/UL (ref 4.6–6.2)
SODIUM SERPL-SCNC: 136 MMOL/L (ref 136–145)
SODIUM SERPL-SCNC: 136 MMOL/L (ref 136–145)
SODIUM UR-SCNC: 35 MMOL/L (ref 20–250)
UUN UR-MCNC: 357 MG/DL (ref 140–1050)
VANCOMYCIN TROUGH SERPL-MCNC: 17.3 UG/ML (ref 10–22)
WBC # BLD AUTO: 10.21 K/UL (ref 3.9–12.7)

## 2019-07-05 PROCEDURE — 80202 ASSAY OF VANCOMYCIN: CPT

## 2019-07-05 PROCEDURE — 99232 SBSQ HOSP IP/OBS MODERATE 35: CPT | Mod: ,,, | Performed by: HOSPITALIST

## 2019-07-05 PROCEDURE — 82570 ASSAY OF URINE CREATININE: CPT

## 2019-07-05 PROCEDURE — 36415 COLL VENOUS BLD VENIPUNCTURE: CPT

## 2019-07-05 PROCEDURE — 82436 ASSAY OF URINE CHLORIDE: CPT

## 2019-07-05 PROCEDURE — 20600001 HC STEP DOWN PRIVATE ROOM

## 2019-07-05 PROCEDURE — 99232 PR SUBSEQUENT HOSPITAL CARE,LEVL II: ICD-10-PCS | Mod: ,,, | Performed by: HOSPITALIST

## 2019-07-05 PROCEDURE — 83735 ASSAY OF MAGNESIUM: CPT | Mod: 91

## 2019-07-05 PROCEDURE — 63600175 PHARM REV CODE 636 W HCPCS: Performed by: HOSPITALIST

## 2019-07-05 PROCEDURE — 84133 ASSAY OF URINE POTASSIUM: CPT

## 2019-07-05 PROCEDURE — 25000003 PHARM REV CODE 250: Performed by: STUDENT IN AN ORGANIZED HEALTH CARE EDUCATION/TRAINING PROGRAM

## 2019-07-05 PROCEDURE — 25000003 PHARM REV CODE 250: Performed by: HOSPITALIST

## 2019-07-05 PROCEDURE — 99232 SBSQ HOSP IP/OBS MODERATE 35: CPT | Mod: ,,, | Performed by: PSYCHIATRY & NEUROLOGY

## 2019-07-05 PROCEDURE — 85610 PROTHROMBIN TIME: CPT

## 2019-07-05 PROCEDURE — 84300 ASSAY OF URINE SODIUM: CPT

## 2019-07-05 PROCEDURE — 81003 URINALYSIS AUTO W/O SCOPE: CPT

## 2019-07-05 PROCEDURE — 84540 ASSAY OF URINE/UREA-N: CPT

## 2019-07-05 PROCEDURE — 85025 COMPLETE CBC W/AUTO DIFF WBC: CPT

## 2019-07-05 PROCEDURE — 86901 BLOOD TYPING SEROLOGIC RH(D): CPT

## 2019-07-05 PROCEDURE — 99232 PR SUBSEQUENT HOSPITAL CARE,LEVL II: ICD-10-PCS | Mod: ,,, | Performed by: PSYCHIATRY & NEUROLOGY

## 2019-07-05 PROCEDURE — 83735 ASSAY OF MAGNESIUM: CPT

## 2019-07-05 PROCEDURE — 25000003 PHARM REV CODE 250: Performed by: PHYSICIAN ASSISTANT

## 2019-07-05 PROCEDURE — 80053 COMPREHEN METABOLIC PANEL: CPT

## 2019-07-05 PROCEDURE — 80053 COMPREHEN METABOLIC PANEL: CPT | Mod: 91

## 2019-07-05 RX ORDER — VANCOMYCIN 2 GRAM/500 ML IN 0.9 % SODIUM CHLORIDE INTRAVENOUS
2000 ONCE
Status: DISCONTINUED | OUTPATIENT
Start: 2019-07-05 | End: 2019-07-05

## 2019-07-05 RX ORDER — IBUPROFEN 400 MG/1
800 TABLET ORAL ONCE
Status: DISCONTINUED | OUTPATIENT
Start: 2019-07-05 | End: 2019-07-05

## 2019-07-05 RX ORDER — WARFARIN 3 MG/1
6 TABLET ORAL DAILY
Status: DISCONTINUED | OUTPATIENT
Start: 2019-07-05 | End: 2019-07-06

## 2019-07-05 RX ORDER — ENOXAPARIN SODIUM 150 MG/ML
120 INJECTION SUBCUTANEOUS
Status: DISCONTINUED | OUTPATIENT
Start: 2019-07-05 | End: 2019-07-10

## 2019-07-05 RX ORDER — ISOSORBIDE DINITRATE AND HYDRALAZINE HYDROCHLORIDE 37.5; 2 MG/1; MG/1
1 TABLET ORAL 2 TIMES DAILY
Status: DISCONTINUED | OUTPATIENT
Start: 2019-07-05 | End: 2019-07-18 | Stop reason: HOSPADM

## 2019-07-05 RX ORDER — FUROSEMIDE 10 MG/ML
120 INJECTION INTRAMUSCULAR; INTRAVENOUS 2 TIMES DAILY
Status: DISCONTINUED | OUTPATIENT
Start: 2019-07-05 | End: 2019-07-05

## 2019-07-05 RX ORDER — IBUPROFEN 400 MG/1
800 TABLET ORAL ONCE
Status: COMPLETED | OUTPATIENT
Start: 2019-07-05 | End: 2019-07-05

## 2019-07-05 RX ORDER — METOLAZONE 5 MG/1
5 TABLET ORAL ONCE
Status: COMPLETED | OUTPATIENT
Start: 2019-07-05 | End: 2019-07-05

## 2019-07-05 RX ORDER — METOLAZONE 5 MG/1
5 TABLET ORAL ONCE
Status: COMPLETED | OUTPATIENT
Start: 2019-07-06 | End: 2019-07-06

## 2019-07-05 RX ORDER — FUROSEMIDE 10 MG/ML
120 INJECTION INTRAMUSCULAR; INTRAVENOUS
Status: DISCONTINUED | OUTPATIENT
Start: 2019-07-06 | End: 2019-07-06

## 2019-07-05 RX ORDER — DOBUTAMINE HYDROCHLORIDE 400 MG/100ML
2.5 INJECTION, SOLUTION INTRAVENOUS CONTINUOUS
Status: DISCONTINUED | OUTPATIENT
Start: 2019-07-05 | End: 2019-07-05

## 2019-07-05 RX ADMIN — PANTOPRAZOLE SODIUM 40 MG: 40 TABLET, DELAYED RELEASE ORAL at 09:07

## 2019-07-05 RX ADMIN — IBUPROFEN 800 MG: 400 TABLET, FILM COATED ORAL at 06:07

## 2019-07-05 RX ADMIN — METOPROLOL SUCCINATE 25 MG: 25 TABLET, EXTENDED RELEASE ORAL at 09:07

## 2019-07-05 RX ADMIN — HYDRALAZINE HYDROCHLORIDE AND ISOSORBIDE DINITRATE 1 TABLET: 37.5; 2 TABLET, FILM COATED ORAL at 10:07

## 2019-07-05 RX ADMIN — FUROSEMIDE 120 MG: 10 INJECTION, SOLUTION INTRAMUSCULAR; INTRAVENOUS at 05:07

## 2019-07-05 RX ADMIN — HYDROMORPHONE HYDROCHLORIDE 1 MG: 1 INJECTION, SOLUTION INTRAMUSCULAR; INTRAVENOUS; SUBCUTANEOUS at 04:07

## 2019-07-05 RX ADMIN — HYDROMORPHONE HYDROCHLORIDE 1 MG: 1 INJECTION, SOLUTION INTRAMUSCULAR; INTRAVENOUS; SUBCUTANEOUS at 11:07

## 2019-07-05 RX ADMIN — ASPIRIN 81 MG: 81 TABLET, COATED ORAL at 09:07

## 2019-07-05 RX ADMIN — DOBUTAMINE IN DEXTROSE 2.5 MCG/KG/MIN: 200 INJECTION, SOLUTION INTRAVENOUS at 06:07

## 2019-07-05 RX ADMIN — WARFARIN SODIUM 6 MG: 3 TABLET ORAL at 06:07

## 2019-07-05 RX ADMIN — METOLAZONE 5 MG: 5 TABLET ORAL at 06:07

## 2019-07-05 NOTE — PLAN OF CARE
CM to bedside - pt sleeping; CM obtained assessment info from recent admit. Pt is independent w/ no DME in place, lives alone. Pt will likely d/c home w/ no needs.     CM provided patient anticipated DELMI which will be update by nursing staff. Patient verbalized understanding. [employer] Packet given to patient and contents explained.     07/05/19 0287   Discharge Reassessment   Assessment Type Discharge Planning Assessment   Do you have any problems affording any of your prescribed medications? No   Discharge Plan A Home   Discharge Plan B Home;Home Health   DME Needed Upon Discharge  none

## 2019-07-05 NOTE — NURSING
MIDLINE TEAM AT BEDSIDE. PT SITTING IN CHAIR WITH BLANKET OVER HIS HEAD, I REMOVED IT TO COMMUNICATE THAT IT WAS TIME TO GAIN IV ACCESS. HE STATED THAT HE DOES NOT WANT IV. I EXPLAINED THAT HE WOULD NOT BE ABLE TO RECEIVE DOBUTAMINE FOR HIS HEART , NOR LASIX TO REMOVE FLD ,NOR MEDICATION TO TREAT INFECTION OR PAIN MEDICATION. PT WOULD NOT AGREE TO IV, MIDLINE RN LEFT.

## 2019-07-05 NOTE — PROGRESS NOTES
Therapy with vancomycin completed and consult discontinued by provider.  Pharmacy will sign off, please re-consult as needed.

## 2019-07-05 NOTE — PROGRESS NOTES
Pharmacokinetic Initial Assessment: IV Vancomycin    Assessment/Plan:    Patient with JEAN-PIERRE on CKD with SCr increase to 1.9 from 1.3 and unmeasured UOP.  Weight = 118 kg with BMI 38.  Patient febrile.  Elevated procal.  Cultures taken on 7/4, not resulted.        Vancomycin 2250 mg IV x 1 administered at 1000 on 7/4.  Will dose 1000 mg every 12 hours and obtain a trough level before the 2nd dose at 0930 on 7/5.    Desired empiric serum trough concentration is 10 to 20 mcg/mL.    Pharmacy will continue to follow and monitor vancomycin.      Please contact pharmacy at extension [phone] with any questions regarding this assessment.     Thank you for the consult,   Luis Alfredo Polanco     Patient brief summary:  Hamlet Terrell is a 53 y.o. male initiated on antimicrobial therapy with IV Vancomycin for treatment of suspected bacteremia / sepsis    Drug Allergies:   Review of patient's allergies indicates:   Allergen Reactions    Acetaminophen      Itching    Oxycodone-acetaminophen      Other reaction(s): Itching    Ace inhibitors Other (See Comments)     cough       Actual Body Weight:   118 kg     Renal Function:   Estimated Creatinine Clearance: 57.8 mL/min (A) (based on SCr of 1.9 mg/dL (H)).,     Dialysis Method (if applicable):  N/A    CBC (last 72 hours):  Recent Labs   Lab Result Units 07/02/19 2215 07/04/19  0429   WBC K/uL 7.62 10.10   Hemoglobin g/dL 8.7* 7.4*   Hemoglobin A1C % 6.0*  --    Hematocrit % 29.5* 25.5*   Platelets K/uL 295 288   Gran% % 57.9 67.6   Lymph% % 18.4 13.1*   Mono% % 18.6* 17.8*   Eosinophil% % 3.8 0.8   Basophil% % 0.8 0.2   Differential Method  Automated Automated       Metabolic Panel (last 72 hours):  Recent Labs   Lab Result Units 07/02/19 2215 07/03/19  0707 07/04/19  0429 07/04/19  1610   Sodium mmol/L 138  --  136 136   Potassium mmol/L 4.0  --  4.5 3.6   Chloride mmol/L 100  --  100 97   CO2 mmol/L 24  --  25 27   Glucose mg/dL 87  --  87 97   Glucose, UA   --  Negative  --   --     BUN, Bld mg/dL 14  --  21* 28*   Creatinine mg/dL 1.3  --  1.7* 1.9*   Creatinine, Random Ur mg/dL  --  234.0  --   --    Albumin g/dL 3.6  --  3.1*  --    Total Bilirubin mg/dL 1.8*  --  2.3*  --    Alkaline Phosphatase U/L 236*  --  187*  --    AST U/L 18  --  15  --    ALT U/L 9*  --  5*  --    Magnesium mg/dL 1.9  --  1.7 1.8       Drug levels (last 3 results):  No results for input(s): VANCOMYCINRA, VANCOMYCINPE, VANCOMYCINTR in the last 72 hours.    Microbiologic Results:  Microbiology Results (last 7 days)     Procedure Component Value Units Date/Time    Gram stain [[id number]] Collected:  07/04/19 1849    Order Status:  Completed Specimen:  Joint Fluid from Shoulder, Right Updated:  07/04/19 1936     Gram Stain Result Rare WBC's      No organisms seen    Culture, Anaerobe [[id number]] Collected:  07/04/19 1849    Order Status:  Sent Specimen:  Joint Fluid from Shoulder, Right Updated:  07/04/19 1859    Aerobic culture [[id number]] Collected:  07/04/19 1849    Order Status:  Sent Specimen:  Joint Fluid from Shoulder, Right Updated:  07/04/19 1858    AFB Culture & Smear [[id number]] Collected:  07/04/19 1849    Order Status:  Sent Specimen:  Joint Fluid from Shoulder, Right Updated:  07/04/19 1857    Fungus culture [[id number]]     Order Status:  No result Specimen:  Joint Fluid from Shoulder, Right     Blood culture [[id number]] Collected:  07/04/19 0553    Order Status:  Completed Specimen:  Blood Updated:  07/04/19 1515     Blood Culture, Routine No Growth to date    Blood culture [[id number]] Collected:  07/04/19 0553    Order Status:  Completed Specimen:  Blood Updated:  07/04/19 1515     Blood Culture, Routine No Growth to date

## 2019-07-05 NOTE — PLAN OF CARE
Problem: Adult Inpatient Plan of Care  Goal: Plan of Care Review  Outcome: Ongoing (interventions implemented as appropriate)  Patient free from falls and injuries throughout shift.  AAO and VSS.  Patient denies chest pain and SOB. Patient started on  gtt at 2.5mcg and Lasix gtt at 5 mg (see note). K+ 3.6 and Mg 1.8; replaced.  BUN/Cre 28/1.9.  Loss of IV access.  No acute events overnight. Patient resting well at this time.  Plan of care discussed with patient.  Patient verbalizes understanding.  Will continue to monitor.

## 2019-07-05 NOTE — PROGRESS NOTES
Pt brought down with RN accompanying pt to attempt MRI of R shoulder. Pt was laid down,  images were obtained and pt refused to continue. Tried to talk pt into attempting at least one sequence, pt laid down and immediately stated he was in too much pain and did not want to continue. Pt was then transported back to room with RN.

## 2019-07-05 NOTE — ASSESSMENT & PLAN NOTE
Possibly decompensated given JEAN-PIERRE, Profile B, NYHA III,  Unclear etiology (possibly ICM, given reported CAD)    Follow up TTE  Diuretic: Agree w Lasix bolus (80mg), and Lasix gtt 10mg/hr w bid lytes, strict I/Os and fluid restriction  Device: not currently indicated, will need follow up TTE as OP to determine whether recovering  BB: continue metop 25qd  Nitrate/Hydral: AA, Class 3-4 CHF, will start Bidil 37.5-20 bid for afterload reduction  ACE/ARB: hold in setting of JEAN-PIERRE  MRA: hold given JEAN-PIERRE

## 2019-07-05 NOTE — CARE UPDATE
0.5 cc blood tinged fluid from right shoulder at bedside and sent for culture. Multiple attempts made at obtaining R shoulder MRI overnight. Patient unable to tolerate MRI then subsequently refused. He continues to report diffuse R upper extremity pain. Would continue to recommend patient get MRI of R shoulder including to sternoclavicular joint given reported distribution of pain. Plan to follow cultures at this time.     Procedure Note:  After consent was obtained, using sterile technique the right shoulder joint was entered and 0.5 cc of blood colored fluid was withdrawn and sent for anaerobic, aerobic, fungal, and AFB cultures. Not enough fluid was obtained for cell count or crystal analysis. Sterile dressing was applied. The procedure was well tolerated.

## 2019-07-05 NOTE — PROGRESS NOTES
PharmD Consult received for:    1.) Vancomycin consult: see Pharm.D note from 7/5/19    2.) Warfarin dosing        Hamlet Terrell is a 53 y.o. male on warfarin therapy for Atrial Fibrillation. PharmD has been consulted for warfarin dosing.     Current order: None  Home dose: 6 mg QD   Coumadin clinic enrollment: Active  INR goal: 2-3  Lab Results   Component Value Date    INR 1.2 07/05/2019    INR 1.3 (H) 07/04/2019    INR 1.2 07/03/2019      Significant drug interactions: None      Recommendation(s):     WARFARIN CURRENTLY ON HOLD   Restart warfarin at 6 mg QD when possible   Given [id number] score=4  and current INR-1.2 may consider bridging with enoxaparin or heparin gtt until INR therapeutic   Pharmacy will continue to follow up     Thank you for the consult,  Catalina Rowe  [phone]      **Note: Consults are reviewed Monday-Friday 7:00am-3:30pm. THE ABOVE RECOMMENDATIONS ARE ONLY SUGGESTED.THE RECOMMENDATIONS SHOULD BE CONSIDERED IN CONJUNCTION WITH ALL PATIENT FACTORS. **    Is This A New Presentation, Or A Follow-Up?: Skin Lesions How Severe Is Your Skin Lesion?: moderate Have Your Skin Lesions Been Treated?: not been treated

## 2019-07-05 NOTE — PROGRESS NOTES
Pharmacist Renal Dose Adjustment Note    Hamlet Terrell is a 53 y.o. male being treated with the medication Zosyn    Patient Data:    Vital Signs (Most Recent):  Temp: 97.8 °F (36.6 °C) (07/05/19 1129)  Pulse: 82 (07/05/19 1129)  Resp: 18 (07/05/19 1129)  BP: 110/62 (07/05/19 1129)  SpO2: 99 % (07/05/19 1129) Vital Signs (72h Range):  Temp:  [97.1 °F (36.2 °C)-101.6 °F (38.7 °C)]   Pulse:  []   Resp:  [14-20]   BP: ()/(42-91)   SpO2:  [92 %-100 %]      Recent Labs   Lab 07/04/19  0429 07/04/19  1610 07/05/19  0856   CREATININE 1.7* 1.9* 1.9*     Serum creatinine: 1.9 mg/dL (H) 07/05/19 0856  Estimated creatinine clearance: 57.8 mL/min (A)    Medication:Zosyn 4.5 g Q 6 hr will be changed to medication: Zosyn 4.5 g Q 8 hr    Pharmacist's Name: Catalina Rowe  Pharmacist's Extension: [phone]

## 2019-07-05 NOTE — PROGRESS NOTES
Pharmacokinetic Assessment Follow Up: IV Vancomycin    Vancomycin serum concentration assessment(s):  Patient has only received vancomycin 2.25 g x 1 dose on 7/4 @ 10:00   So far has refused dose last night and this morning  Also with new JEAN-PIERRE (Scr-1.9 up from 1.3 on admission)     Vancomycin Regimen Plan:  Vancomycin 24 hr random level-17.3 (drawn on 7/5 @ 10:50)   Discontinued standing vancomycin order   Ordered vancomycin 2 g x 1 dose (will dose per level until JEAN-PIERRE recovers)   Ordered vancomycin rd level for 7/6 @ 14:00      Thank you for the consult,  Catalina Rowe  [id number]      **Note: Consults are reviewed Monday-Friday 7:00am-3:30pm. THE ABOVE RECOMMENDATIONS ARE ONLY SUGGESTED.THE RECOMMENDATIONS SHOULD BE CONSIDERED IN CONJUNCTION WITH ALL PATIENT FACTORS. **   Patient brief summary:  Hamlet Terrell is a 53 y.o. male initiated on antimicrobial therapy with IV Vancomycin empiric coverage for fever of LEW. BC NGTD     Actual Body Weight:   118.4 kg     Renal Function:   Estimated Creatinine Clearance: 57.8 mL/min (A) (based on SCr of 1.9 mg/dL (H)). JEAN-PIERRE     CBC (last 72 hours):  Recent Labs   Lab Result Units 07/02/19 2215 07/04/19 0429 07/05/19  0856   WBC K/uL 7.62 10.10 10.21   Hemoglobin g/dL 8.7* 7.4* 7.4*   Hemoglobin A1C % 6.0*  --   --    Hematocrit % 29.5* 25.5* 25.0*   Platelets K/uL 295 288 251   Gran% % 57.9 67.6 74.5*   Lymph% % 18.4 13.1* 7.9*   Mono% % 18.6* 17.8* 12.0   Eosinophil% % 3.8 0.8 5.0   Basophil% % 0.8 0.2 0.2   Differential Method  Automated Automated Automated       Metabolic Panel (last 72 hours):  Recent Labs   Lab Result Units 07/02/19 2215 07/03/19  0707 07/04/19  0429 07/04/19  1610 07/05/19  0856   Sodium mmol/L 138  --  136 136 136   Potassium mmol/L 4.0  --  4.5 3.6 3.8   Chloride mmol/L 100  --  100 97 99   CO2 mmol/L 24  --  25 27 25   Glucose mg/dL 87  --  87 97 143*   Glucose, UA   --  Negative  --   --   --    BUN, Bld mg/dL 14  --  21* 28* 33*   Creatinine  mg/dL 1.3  --  1.7* 1.9* 1.9*   Creatinine, Random Ur mg/dL  --  234.0  --   --   --    Albumin g/dL 3.6  --  3.1*  --  3.0*   Total Bilirubin mg/dL 1.8*  --  2.3*  --  1.9*   Alkaline Phosphatase U/L 236*  --  187*  --  169*   AST U/L 18  --  15  --  16   ALT U/L 9*  --  5*  --  7*   Magnesium mg/dL 1.9  --  1.7 1.8 1.8       Vancomycin Administrations:  vancomycin given in the last 96 hours                     vancomycin (VANCOCIN) 2,250 mg in dextrose 5 % 500 mL IVPB (mg) 2,250 mg New Bag 07/04/19 1018                      Drug levels (last 3 results):  Recent Labs   Lab Result Units 07/05/19  1050   Vancomycin-Trough ug/mL 17.3       Microbiologic Results:  Microbiology Results (last 7 days)       Procedure Component Value Units Date/Time    AFB Culture & Smear [[id number]] Collected:  07/04/19 1849    Order Status:  Completed Specimen:  Joint Fluid from Shoulder, Right Updated:  07/05/19 1229     AFB CULTURE STAIN No acid fast bacilli seen.    Blood culture [[id number]] Collected:  07/04/19 0553    Order Status:  Completed Specimen:  Blood Updated:  07/05/19 0822     Blood Culture, Routine No Growth to date      No Growth to date    Blood culture [[id number]] Collected:  07/04/19 0553    Order Status:  Completed Specimen:  Blood Updated:  07/05/19 0822     Blood Culture, Routine No Growth to date      No Growth to date    Gram stain [[id number]] Collected:  07/04/19 1849    Order Status:  Completed Specimen:  Joint Fluid from Shoulder, Right Updated:  07/04/19 1936     Gram Stain Result Rare WBC's      No organisms seen    Culture, Anaerobe [[id number]] Collected:  07/04/19 1849    Order Status:  Sent Specimen:  Joint Fluid from Shoulder, Right Updated:  07/04/19 1859    Aerobic culture [[id number]] Collected:  07/04/19 1849    Order Status:  Sent Specimen:  Joint Fluid from Shoulder, Right Updated:  07/04/19 1858    Fungus culture [[id number]]     Order Status:  No result Specimen:  Joint Fluid from Shoulder, Right

## 2019-07-05 NOTE — NURSING
Transport at bedside to take pt to echo lab, pt refused. Charge rn at bedside to encourage pt, he refused

## 2019-07-05 NOTE — ASSESSMENT & PLAN NOTE
CHADS-VASC 3  - Would resume home warfarin (prior discussions of NOAC which could be considered but likely some evidence of JEAN-PIERRE on CKD, and patient has declined copays in the past)  - Continue home metoprolol

## 2019-07-05 NOTE — SUBJECTIVE & OBJECTIVE
Past Medical History:   Diagnosis Date    Anticoagulant long-term use     Cardiomegaly     Chronic combined systolic and diastolic congestive heart failure     Coronary artery disease     Heart attack 2006    Hypertension     Hyperthyroidism, subclinical 1/2/2013    MI (myocardial infarction) 9/22/2013    MI in 2009      Paroxysmal atrial fibrillation     PE (pulmonary embolism) 1/1/2013    IN 2010     S/P ablation of atrial flutter 2008    Stroke 2009    no residual weaknesses       Past Surgical History:   Procedure Laterality Date    RADIOFREQUENCY ABLATION  01/08/2008    for atrial flutter       Review of patient's allergies indicates:   Allergen Reactions    Acetaminophen      Itching    Oxycodone-acetaminophen      Other reaction(s): Itching    Ace inhibitors Other (See Comments)     cough       No current facility-administered medications on file prior to encounter.      Current Outpatient Medications on File Prior to Encounter   Medication Sig    albuterol (PROVENTIL/VENTOLIN HFA) 90 mcg/actuation inhaler Inhale 1-2 puffs into the lungs every 6 (six) hours as needed for Wheezing. Rescue    aspirin (ECOTRIN) 81 MG EC tablet Take 81 mg by mouth once daily.    calcium carbonate (TUMS) 200 mg calcium (500 mg) chewable tablet Take 1 tablet (500 mg total) by mouth 3 (three) times daily as needed.    ferrous sulfate 325 (65 FE) MG EC tablet Take 1 tablet (325 mg total) by mouth once daily.    furosemide (LASIX) 40 MG tablet Take 80 mg by mouth 2 (two) times daily.    HYDROcodone-acetaminophen (NORCO) 5-325 mg per tablet Take 1 tablet by mouth every 6 (six) hours as needed for Pain (severe pain preventing sleep).    losartan (COZAAR) 25 MG tablet Take 0.5 tablets (12.5 mg total) by mouth once daily.    metoprolol succinate (TOPROL-XL) 25 MG 24 hr tablet Take 1 tablet (25 mg total) by mouth once daily.    nitroGLYCERIN (NITROSTAT) 0.4 MG SL tablet Place 1 tablet (0.4 mg total) under the  tongue every 5 (five) minutes as needed for Chest pain. Tablet, Sublingual Sublingual    pantoprazole (PROTONIX) 40 MG tablet Take 1 tablet (40 mg total) by mouth once daily.    warfarin (COUMADIN) 6 MG tablet Take 1 tablet (6 mg total) by mouth Daily.     Family History     Problem Relation (Age of Onset)    Alcohol abuse Father    Hypertension Mother, Father, Sister, Brother    Stroke Mother        Tobacco Use    Smoking status: Never Smoker    Smokeless tobacco: Never Used   Substance and Sexual Activity    Alcohol use: No    Drug use: No    Sexual activity: Never     Review of Systems   Constitution: Negative. Negative for chills and fever.   HENT: Negative.    Eyes: Negative.    Cardiovascular: Positive for leg swelling and orthopnea. Negative for chest pain, claudication and paroxysmal nocturnal dyspnea.   Respiratory: Negative for cough, shortness of breath and wheezing.    Endocrine: Negative.    Hematologic/Lymphatic: Does not bruise/bleed easily.   Skin: Negative for nail changes and rash.   Musculoskeletal: Negative.  Negative for back pain.   Gastrointestinal: Negative for abdominal pain, melena, nausea and vomiting.   Neurological: Negative for dizziness and headaches.   Psychiatric/Behavioral: Negative for altered mental status, depression and substance abuse.   Allergic/Immunologic: Negative.      Objective:     Vital Signs (Most Recent):  Temp: 97.5 °F (36.4 °C) (07/05/19 1520)  Pulse: 72 (07/05/19 1520)  Resp: 18 (07/05/19 1520)  BP: 136/60 (07/05/19 1520)  SpO2: 96 % (07/05/19 1520) Vital Signs (24h Range):  Temp:  [97.5 °F (36.4 °C)-98.3 °F (36.8 °C)] 97.5 °F (36.4 °C)  Pulse:  [] 72  Resp:  [18-20] 18  SpO2:  [96 %-100 %] 96 %  BP: (110-136)/(51-71) 136/60     Weight: 118.4 kg (261 lb)  Body mass index is 38.54 kg/m².    SpO2: 96 %  O2 Device (Oxygen Therapy): room air      Intake/Output Summary (Last 24 hours) at 7/5/2019 1750  Last data filed at 7/5/2019 0800  Gross per 24 hour    Intake 118 ml   Output 575 ml   Net -457 ml       Lines/Drains/Airways     Peripheral Intravenous Line                 Peripheral IV - Single Lumen 07/05/19 1545 20 G Left Hand less than 1 day                Physical Exam   Constitutional: He is oriented to person, place, and time. He appears well-developed and well-nourished.   HENT:   Head: Normocephalic and atraumatic.   Eyes: Pupils are equal, round, and reactive to light. Conjunctivae and EOM are normal.   Neck: JVD present.   JVD to manible     Cardiovascular: Normal rate, regular rhythm, normal heart sounds and intact distal pulses. Exam reveals no gallop and no friction rub.   No murmur heard.  Pulmonary/Chest: Effort normal. No stridor. He has no wheezes. He has rales. He exhibits no tenderness.   Abdominal: Soft. Bowel sounds are normal. He exhibits no distension and no mass. There is no tenderness. There is no guarding.   Musculoskeletal: He exhibits edema. He exhibits no tenderness or deformity.   Neurological: He is alert and oriented to person, place, and time.   Skin: Skin is warm and dry. No rash noted. No erythema.   Psychiatric: He has a normal mood and affect.       Significant Labs:     Lab Results   Component Value Date    WBC 10.21 07/05/2019    HGB 7.4 (L) 07/05/2019    HCT 25.0 (L) 07/05/2019    MCV 81 (L) 07/05/2019     07/05/2019       CMP  Sodium   Date Value Ref Range Status   07/05/2019 136 136 - 145 mmol/L Final     Potassium   Date Value Ref Range Status   07/05/2019 3.8 3.5 - 5.1 mmol/L Final     Chloride   Date Value Ref Range Status   07/05/2019 99 95 - 110 mmol/L Final     CO2   Date Value Ref Range Status   07/05/2019 25 23 - 29 mmol/L Final     Glucose   Date Value Ref Range Status   07/05/2019 143 (H) 70 - 110 mg/dL Final     BUN, Bld   Date Value Ref Range Status   07/05/2019 33 (H) 6 - 20 mg/dL Final     Creatinine   Date Value Ref Range Status   07/05/2019 1.9 (H) 0.5 - 1.4 mg/dL Final     Calcium   Date Value Ref  Range Status   07/05/2019 9.2 8.7 - 10.5 mg/dL Final     Total Protein   Date Value Ref Range Status   07/05/2019 7.8 6.0 - 8.4 g/dL Final     Albumin   Date Value Ref Range Status   07/05/2019 3.0 (L) 3.5 - 5.2 g/dL Final     Total Bilirubin   Date Value Ref Range Status   07/05/2019 1.9 (H) 0.1 - 1.0 mg/dL Final     Comment:     For infants and newborns, interpretation of results should be based  on gestational age, weight and in agreement with clinical  observations.  Premature Infant recommended reference ranges:  Up to 24 hours.............<8.0 mg/dL  Up to 48 hours............<12.0 mg/dL  3-5 days..................<15.0 mg/dL  6-29 days.................<15.0 mg/dL       Alkaline Phosphatase   Date Value Ref Range Status   07/05/2019 169 (H) 55 - 135 U/L Final     AST   Date Value Ref Range Status   07/05/2019 16 10 - 40 U/L Final     ALT   Date Value Ref Range Status   07/05/2019 7 (L) 10 - 44 U/L Final     Anion Gap   Date Value Ref Range Status   07/05/2019 12 8 - 16 mmol/L Final     eGFR if    Date Value Ref Range Status   07/05/2019 45.8 (A) >60 mL/min/1.73 m^2 Final     eGFR if non    Date Value Ref Range Status   07/05/2019 39.7 (A) >60 mL/min/1.73 m^2 Final     Comment:     Calculation used to obtain the estimated glomerular filtration  rate (eGFR) is the CKD-EPI equation.            Significant Imaging:       Echocardiogram:   Transthoracic echo (TTE) complete (Cupid Only):   Results for orders placed or performed during the hospital encounter of 04/15/19   Transthoracic echo (TTE) complete (Cupid Only)   Result Value Ref Range    Ascending aorta 3.68 cm    STJ 3.01 cm    AV mean gradient 3.07 mmHg    Ao peak ozzie 1.15 m/s    Ao VTI 18.57 cm    IVRT 0.09 msec    IVS 1.21 (A) 0.6 - 1.1 cm    LA size 6.18 cm    Left Atrium Major Axis 7.29 cm    Left Atrium Minor Axis 7.51 cm    LVIDD 5.88 3.5 - 6.0 cm    LVIDS 5.12 (A) 2.1 - 4.0 cm    LVOT diameter 2.52 cm    LVOT peak VTI  9.64 cm    PW 1.21 (A) 0.6 - 1.1 cm    MV Peak E Karri 1.16 m/s    RA Major Axis 7.04 cm    RA Width 4.87 cm    RVDD 5.78 cm    Sinus 3.36 cm    TAPSE 1.56 cm    TR Max Karri 2.85 m/s    TDI LATERAL 0.06     TDI SEPTAL 0.04     LA WIDTH 5.16 cm    LV Diastolic Volume 172.13 mL    LV Systolic Volume 124.91 mL    RV S' 5.90 m/s    LVOT peak karri [id number] m/s    LV LATERAL E/E' RATIO 19.33     LV SEPTAL E/E' RATIO 29.00     FS 13 %    LA volume 200.54 cm3    LV mass 307.18 g    Left Ventricle Relative Wall Thickness 0.41 cm    AV valve area 2.59 cm2    AV Velocity Ratio 0.57     AV index (prosthetic) 0.52     Mean e' 0.05     LVOT area 4.99 cm2    LVOT stroke volume 48.06 cm3    AV peak gradient 5.29 mmHg    E/E' ratio 23.20     LV Systolic Volume Index 53.1 mL/m2    LV Diastolic Volume Index 73.24 mL/m2    LA Volume Index 85.3 mL/m2    LV Mass Index 130.7 g/m2    Triscuspid Valve Regurgitation Peak Gradient 32.49 mmHg    BSA 2.45 m2    Right Atrial Pressure (from IVC) 15 mmHg    TV rest pulmonary artery pressure 47 mmHg    Narrative    · Severely decreased left ventricular systolic function. The estimated   ejection fraction is 23%  · Eccentric left ventricular hypertrophy.  · Left ventricular diastolic dysfunction.  · Septal wall has abnormal motion.  · Local segmental wall motion abnormalities.  · Severe left atrial enlargement.  · Moderate right ventricular enlargement.  · Mildly reduced right ventricular systolic function.  · Severe right atrial enlargement.  · Moderate mitral regurgitation.  · Moderate tricuspid regurgitation.  · Elevated central venous pressure (15 mm Hg).  · The estimated PA systolic pressure is 47 mm Hg  · Pulmonary hypertension present.  · Trivial Pericardial effusion.       and     EKG: afib w PVCs, LAD, poor R-wave progression, old Q-waves in inferior leads

## 2019-07-05 NOTE — ASSESSMENT & PLAN NOTE
53 year old male with history of CHF (EF 23& with diastolic dysfunction), HTN, Afib on coumadin, CAD and prior CVA in 2009 with documented residual right sided weakness who presented to [facility] [place] for evaluation of right shoulder pain and acute onset RLE weakness 4 days prior to presentation. Neurology is consulted for evaluation and management of unilateral polyneuropathy/neuralgia of unclear etiology    - Pt denies any prior injury to his RLE and states that his prior weakness resolved 3 days after his prior CVA.   - MRI of Brain from earlier in admission show chronic left frontal and parietal lobes infarction as well as chronic right cerebellar punctate infarctions.   - MRI of spine shows degenerative changes and diffuse osteophyte disease but is limited due to motion.  - Per primary team note, pt was able to walk to bathroom this afternoon by himself.     Plan  - Pt refused to participate in neurologic exam, based on history and documentation from primary team, feel that this is less likely to be neurologic in origin.   - Recommend completing MRI on shoulder to assess for MSK origin for pain  - Due to refusal to be examined, will sign off at this time. Please call back if situation changes.

## 2019-07-05 NOTE — PLAN OF CARE
07/05/19 1528   Discharge Assessment   Assessment Type Discharge Planning Assessment   Confirmed/corrected address and phone number on facesheet? Yes   Assessment information obtained from? Patient;Medical Record   Expected Length of Stay (days) 5   Communicated expected length of stay with patient/caregiver yes   Prior to hospitilization cognitive status: Alert/Oriented   Prior to hospitalization functional status: Independent   Current cognitive status: Alert/Oriented   Current Functional Status: Independent   Facility Arrived From: N/A   Lives With alone   Able to Return to Prior Arrangements yes   Is patient able to care for self after discharge? Unable to determine at this time (comments)   Who are your caregiver(s) and their phone number(s)? brother - Kris [phone]   Patient's perception of discharge disposition home or selfcare   Readmission Within the Last 30 Days current reason for admission unrelated to previous admission   If yes, most recent facility name: [facility] [place]   Patient currently being followed by outpatient case management? No   Patient currently receives any other outside agency services? No   Equipment Currently Used at Home none   Do you have any problems affording any of your prescribed medications? No   Is the patient taking medications as prescribed? yes   Does the patient have transportation home? Yes  (family)   Transportation Anticipated family or friend will provide   Dialysis Name and Scheduled days N/A   Does the patient receive services at the [facility]? No   Discharge Plan A Home   Discharge Plan B Home;Home Health   DME Needed Upon Discharge  none   Patient/Family in Agreement with Plan yes

## 2019-07-05 NOTE — HPI
54 y/o man history of chronic systolic heart failure (presumed ICM, EF 23%), CAD, HTN, chronic afib (questionable adherence on warfarin) presenting with right-sided weakness and worsening bilateral lower extremity edema. No LHC in our record but reported history of CAD on home aspirin; denies adherence with BB. He reports noticing a considerable increase in wt 220->260 lbs in setting of medication and dietary nonadherence. Worsening leg swelling and orthopnea. He denies chest pain or significant worsening in his baseline MUÑOZ. Reports intermittent non-adherence with his warfarin therapy.

## 2019-07-05 NOTE — HPI
Hamlet Terrell is a 53 year old male with a medical history significant for combined CHF (EF 23& with diastolic dysfunction), HTN, Afib on chronic coumadin anticoagulation, CAD and prior CVA in 2009 with documented residual right sided weakness who [place] [facility] for evaluation of right shoulder pain and RLU weakness. Pt states that he woke up approximately 4 days prior to presentation with acute onset RLE weakness. Pt states that he has no sensation below ankle on right. Pt states that he attempted to move around his house and hurt his shoulder in the process. Pt denies any prior injury to his RLE and states that his prior weakness resolved 3 days after his prior CVA. Pt has not been on ASA/Plavix or Statin per his history.     Neurology is consulted for evaluation and management of unilateral polyneuropathy/neuralgia of unclear etiology    On evaluation, pt refuses to participate with examination. Pt is covered with a blanket and refuses to move his RUE, RLE or head for further examination. A MRI of Brain from earlier in admission show chronic left frontal and parietal lobes infarction as well as chronic right cerebellar punctate infarctions. MRI of spine shows degenerative changes and diffuse osteophyte disease but is limited due to motion.

## 2019-07-05 NOTE — SIGNIFICANT EVENT
"  Notified by DIANA Miller that today patient has been refusing diagnostic tests, refusing IV placement, does not want to move from chair and has covered himself up and is refusing to participate in his care.     Presented to bedside @1415 with [facility] charge RN, DIANA Miller, patient advocacy staff members.     Patient in recliner chair with blanket pulled over his head. Upon entering announcing ourselves in room, patient not responding verbally.  [place] pulled back and then patient not opening eyes initially and eventually patient stated that he was trying to get some sleep.     I described in detail to patient medical decision making regarding current diagnostic workup and treatment plans with respsect to management of heart failure exacerbation, for w/u and management of his unilateral right sided shoulder pain and extremity weakness and RLE pain and weakness.     I saw patient yesterday evening approx 20:30 after he moved to [facility] and when Dobutamine and Lasix were started.  Patient denies he was ever started on these medications, denies that an IV was replaced overnight.     Explained clinical rationale.  Explain risks of refusal of treatment include - worsening renal failure, arrhythmias, worsening heart failure, hemodynamic compromise and need for ICU level of care.  Refusal of eval for infected joint includes progression of infection and sepsis.     Patient was upset said a member of nursing staff made a comment per him "patient is staying here for free" and he was upset stating he pays for his medical insurance. Then patient made a comment that if sees that nurse again he's gonna go off and made a comment "your gonna have to call security on me"   I immediately stated that it is inappropriate to make verbal or physical abuse threats towards any medical staff member.  Charge RN asked who made such comments.     -Ultimately patient stated he did not want MRI and will cancel order.   -He states now he will accept placement " of IV and to receive an ECHO cardiogram  -When he has IV acces - will need to re-bolus Lasix IV and start infusion.   -Will hold antibiotics at this time unless culture data or repeat fever warrants re-initiation    I had to leave room before other staff to take a phone call, upon return to the room patient was in the bathroom. Since admit pt stated he could not walk at home due to pain and on examination attempts by myself and other medical teams was not moving his right leg and claiming inability to move leg due to weaknes.  Through bathroom door patient stated he walked to bathroom by leaning on the wall.         José Luis Langford M.D.  Attending Physician  [facility] Medicine Dept.  Pager: [phone]  Spectralink -x [phone]

## 2019-07-05 NOTE — CONSULTS
[facility]  Cardiology  Consult Note    Patient Name: Hamlet Terrell  MRN: [id number]  Admission Date: 7/2/2019  Hospital Length of Stay: 1 days  Code Status: Full Code   Attending Provider: Dr. Helen Santos  Consulting Provider: Checo Oneill MD  Primary Care Physician: Carlin Swift MD  Principal Problem:Acute on chronic combined systolic and diastolic heart failure    Patient information was obtained from patient and past medical records.     Consults  Subjective:     Chief Complaint:  Leg swelling, weight gain, orthopnea     HPI:   52 y/o man history of chronic systolic heart failure (presumed ICM, EF 23%), CAD, HTN, chronic afib (questionable adherence on warfarin) presenting with right-sided weakness and worsening bilateral lower extremity edema. No LHC in our record but reported history of CAD on home aspirin; denies adherence with BB. He reports noticing a considerable increase in wt 220->260 lbs in setting of medication and dietary nonadherence. Worsening leg swelling and orthopnea. He denies chest pain or significant worsening in his baseline MUÑOZ. Reports intermittent non-adherence with his warfarin therapy.    Past Medical History:   Diagnosis Date    Anticoagulant long-term use     Cardiomegaly     Chronic combined systolic and diastolic congestive heart failure     Coronary artery disease     Heart attack 2006    Hypertension     Hyperthyroidism, subclinical 1/2/2013    MI (myocardial infarction) 9/22/2013    MI in 2009      Paroxysmal atrial fibrillation     PE (pulmonary embolism) 1/1/2013    IN 2010     S/P ablation of atrial flutter 2008    Stroke 2009    no residual weaknesses       Past Surgical History:   Procedure Laterality Date    RADIOFREQUENCY ABLATION  01/08/2008    for atrial flutter       Review of patient's allergies indicates:   Allergen Reactions    Acetaminophen      Itching    Oxycodone-acetaminophen      Other reaction(s): Itching    Ace  inhibitors Other (See Comments)     cough       No current facility-administered medications on file prior to encounter.      Current Outpatient Medications on File Prior to Encounter   Medication Sig    albuterol (PROVENTIL/VENTOLIN HFA) 90 mcg/actuation inhaler Inhale 1-2 puffs into the lungs every 6 (six) hours as needed for Wheezing. Rescue    aspirin (ECOTRIN) 81 MG EC tablet Take 81 mg by mouth once daily.    calcium carbonate (TUMS) 200 mg calcium (500 mg) chewable tablet Take 1 tablet (500 mg total) by mouth 3 (three) times daily as needed.    ferrous sulfate 325 (65 FE) MG EC tablet Take 1 tablet (325 mg total) by mouth once daily.    furosemide (LASIX) 40 MG tablet Take 80 mg by mouth 2 (two) times daily.    HYDROcodone-acetaminophen (NORCO) 5-325 mg per tablet Take 1 tablet by mouth every 6 (six) hours as needed for Pain (severe pain preventing sleep).    losartan (COZAAR) 25 MG tablet Take 0.5 tablets (12.5 mg total) by mouth once daily.    metoprolol succinate (TOPROL-XL) 25 MG 24 hr tablet Take 1 tablet (25 mg total) by mouth once daily.    nitroGLYCERIN (NITROSTAT) 0.4 MG SL tablet Place 1 tablet (0.4 mg total) under the tongue every 5 (five) minutes as needed for Chest pain. Tablet, Sublingual Sublingual    pantoprazole (PROTONIX) 40 MG tablet Take 1 tablet (40 mg total) by mouth once daily.    warfarin (COUMADIN) 6 MG tablet Take 1 tablet (6 mg total) by mouth Daily.     Family History     Problem Relation (Age of Onset)    Alcohol abuse Father    Hypertension Mother, Father, Sister, Brother    Stroke Mother        Tobacco Use    Smoking status: Never Smoker    Smokeless tobacco: Never Used   Substance and Sexual Activity    Alcohol use: No    Drug use: No    Sexual activity: Never     Review of Systems   Constitution: Negative. Negative for chills and fever.   HENT: Negative.    Eyes: Negative.    Cardiovascular: Positive for leg swelling and orthopnea. Negative for chest pain,  claudication and paroxysmal nocturnal dyspnea.   Respiratory: Negative for cough, shortness of breath and wheezing.    Endocrine: Negative.    Hematologic/Lymphatic: Does not bruise/bleed easily.   Skin: Negative for nail changes and rash.   Musculoskeletal: Negative.  Negative for back pain.   Gastrointestinal: Negative for abdominal pain, melena, nausea and vomiting.   Neurological: Negative for dizziness and headaches.   Psychiatric/Behavioral: Negative for altered mental status, depression and substance abuse.   Allergic/Immunologic: Negative.      Objective:     Vital Signs (Most Recent):  Temp: 97.5 °F (36.4 °C) (07/05/19 1520)  Pulse: 72 (07/05/19 1520)  Resp: 18 (07/05/19 1520)  BP: 136/60 (07/05/19 1520)  SpO2: 96 % (07/05/19 1520) Vital Signs (24h Range):  Temp:  [97.5 °F (36.4 °C)-98.3 °F (36.8 °C)] 97.5 °F (36.4 °C)  Pulse:  [] 72  Resp:  [18-20] 18  SpO2:  [96 %-100 %] 96 %  BP: (110-136)/(51-71) 136/60     Weight: 118.4 kg (261 lb)  Body mass index is 38.54 kg/m².    SpO2: 96 %  O2 Device (Oxygen Therapy): room air      Intake/Output Summary (Last 24 hours) at 7/5/2019 1750  Last data filed at 7/5/2019 0800  Gross per 24 hour   Intake 118 ml   Output 575 ml   Net -457 ml       Lines/Drains/Airways     Peripheral Intravenous Line                 Peripheral IV - Single Lumen 07/05/19 1545 20 G Left Hand less than 1 day                Physical Exam   Constitutional: He is oriented to person, place, and time. He appears well-developed and well-nourished.   HENT:   Head: Normocephalic and atraumatic.   Eyes: Pupils are equal, round, and reactive to light. Conjunctivae and EOM are normal.   Neck: JVD present.   JVD to manible     Cardiovascular: Normal rate, regular rhythm, normal heart sounds and intact distal pulses. Exam reveals no gallop and no friction rub.   No murmur heard.  Pulmonary/Chest: Effort normal. No stridor. He has no wheezes. He has rales. He exhibits no tenderness.   Abdominal: Soft.  Bowel sounds are normal. He exhibits no distension and no mass. There is no tenderness. There is no guarding.   Musculoskeletal: He exhibits edema. He exhibits no tenderness or deformity.   Neurological: He is alert and oriented to person, place, and time.   Skin: Skin is warm and dry. No rash noted. No erythema.   Psychiatric: He has a normal mood and affect.       Significant Labs:     Lab Results   Component Value Date    WBC 10.21 07/05/2019    HGB 7.4 (L) 07/05/2019    HCT 25.0 (L) 07/05/2019    MCV 81 (L) 07/05/2019     07/05/2019       CMP  Sodium   Date Value Ref Range Status   07/05/2019 136 136 - 145 mmol/L Final     Potassium   Date Value Ref Range Status   07/05/2019 3.8 3.5 - 5.1 mmol/L Final     Chloride   Date Value Ref Range Status   07/05/2019 99 95 - 110 mmol/L Final     CO2   Date Value Ref Range Status   07/05/2019 25 23 - 29 mmol/L Final     Glucose   Date Value Ref Range Status   07/05/2019 143 (H) 70 - 110 mg/dL Final     BUN, Bld   Date Value Ref Range Status   07/05/2019 33 (H) 6 - 20 mg/dL Final     Creatinine   Date Value Ref Range Status   07/05/2019 1.9 (H) 0.5 - 1.4 mg/dL Final     Calcium   Date Value Ref Range Status   07/05/2019 9.2 8.7 - 10.5 mg/dL Final     Total Protein   Date Value Ref Range Status   07/05/2019 7.8 6.0 - 8.4 g/dL Final     Albumin   Date Value Ref Range Status   07/05/2019 3.0 (L) 3.5 - 5.2 g/dL Final     Total Bilirubin   Date Value Ref Range Status   07/05/2019 1.9 (H) 0.1 - 1.0 mg/dL Final     Comment:     For infants and newborns, interpretation of results should be based  on gestational age, weight and in agreement with clinical  observations.  Premature Infant recommended reference ranges:  Up to 24 hours.............<8.0 mg/dL  Up to 48 hours............<12.0 mg/dL  3-5 days..................<15.0 mg/dL  6-29 days.................<15.0 mg/dL       Alkaline Phosphatase   Date Value Ref Range Status   07/05/2019 169 (H) 55 - 135 U/L Final     AST    Date Value Ref Range Status   07/05/2019 16 10 - 40 U/L Final     ALT   Date Value Ref Range Status   07/05/2019 7 (L) 10 - 44 U/L Final     Anion Gap   Date Value Ref Range Status   07/05/2019 12 8 - 16 mmol/L Final     eGFR if    Date Value Ref Range Status   07/05/2019 45.8 (A) >60 mL/min/1.73 m^2 Final     eGFR if non    Date Value Ref Range Status   07/05/2019 39.7 (A) >60 mL/min/1.73 m^2 Final     Comment:     Calculation used to obtain the estimated glomerular filtration  rate (eGFR) is the CKD-EPI equation.            Significant Imaging:       Echocardiogram:   Transthoracic echo (TTE) complete (Cupid Only):   Results for orders placed or performed during the hospital encounter of 04/15/19   Transthoracic echo (TTE) complete (Cupid Only)   Result Value Ref Range    Ascending aorta 3.68 cm    STJ 3.01 cm    AV mean gradient 3.07 mmHg    Ao peak karri 1.15 m/s    Ao VTI 18.57 cm    IVRT 0.09 msec    IVS 1.21 (A) 0.6 - 1.1 cm    LA size 6.18 cm    Left Atrium Major Axis 7.29 cm    Left Atrium Minor Axis 7.51 cm    LVIDD 5.88 3.5 - 6.0 cm    LVIDS 5.12 (A) 2.1 - 4.0 cm    LVOT diameter 2.52 cm    LVOT peak VTI 9.64 cm    PW 1.21 (A) 0.6 - 1.1 cm    MV Peak E Karri 1.16 m/s    RA Major Axis 7.04 cm    RA Width 4.87 cm    RVDD 5.78 cm    Sinus 3.36 cm    TAPSE 1.56 cm    TR Max Karri 2.85 m/s    TDI LATERAL 0.06     TDI SEPTAL 0.04     LA WIDTH 5.16 cm    LV Diastolic Volume 172.13 mL    LV Systolic Volume 124.91 mL    RV S' 5.90 m/s    LVOT peak karri [id number] m/s    LV LATERAL E/E' RATIO 19.33     LV SEPTAL E/E' RATIO 29.00     FS 13 %    LA volume 200.54 cm3    LV mass 307.18 g    Left Ventricle Relative Wall Thickness 0.41 cm    AV valve area 2.59 cm2    AV Velocity Ratio 0.57     AV index (prosthetic) 0.52     Mean e' 0.05     LVOT area 4.99 cm2    LVOT stroke volume 48.06 cm3    AV peak gradient 5.29 mmHg    E/E' ratio 23.20     LV Systolic Volume Index 53.1 mL/m2    LV  Diastolic Volume Index 73.24 mL/m2    LA Volume Index 85.3 mL/m2    LV Mass Index 130.7 g/m2    Triscuspid Valve Regurgitation Peak Gradient 32.49 mmHg    BSA 2.45 m2    Right Atrial Pressure (from IVC) 15 mmHg    TV rest pulmonary artery pressure 47 mmHg    Narrative    · Severely decreased left ventricular systolic function. The estimated   ejection fraction is 23%  · Eccentric left ventricular hypertrophy.  · Left ventricular diastolic dysfunction.  · Septal wall has abnormal motion.  · Local segmental wall motion abnormalities.  · Severe left atrial enlargement.  · Moderate right ventricular enlargement.  · Mildly reduced right ventricular systolic function.  · Severe right atrial enlargement.  · Moderate mitral regurgitation.  · Moderate tricuspid regurgitation.  · Elevated central venous pressure (15 mm Hg).  · The estimated PA systolic pressure is 47 mm Hg  · Pulmonary hypertension present.  · Trivial Pericardial effusion.       and     EKG: afib w PVCs, LAD, poor R-wave progression, old Q-waves in inferior leads    Assessment and Plan:     * Acute on chronic combined systolic and diastolic heart failure  Possibly decompensated given JEAN-PIERRE, Profile B, NYHA III,  Unclear etiology (possibly ICM, given reported CAD)    Follow up TTE  Diuretic: Agree w Lasix bolus (80mg), and Lasix gtt 10mg/hr w bid lytes, strict I/Os and fluid restriction  Device: not currently indicated, will need follow up TTE as OP to determine whether recovering  BB: continue metop 25qd  Nitrate/Hydral: AA, Class 3-4 CHF, will start Bidil 37.5-20 bid for afterload reduction  ACE/ARB: hold in setting of JEAN-PIERRE  MRA: hold given JEAN-PIERRE      Atrial fibrillation, chronic  CHADS-VASC 3  - Would resume home warfarin (prior discussions of NOAC which could be considered but likely some evidence of JEAN-PIERRE on CKD, and patient has declined copays in the past)  - Continue home metoprolol        VTE Risk Mitigation (From admission, onward)        Ordered     IP VTE  HIGH RISK PATIENT  Once      07/03/19 0955     Place sequential compression device  Until discontinued      07/03/19 0955     Place CAMI hose  Until discontinued      07/03/19 0841          Thank you for your consult. I will follow-up with patient. Please contact us if you have any additional questions.    Checo Oneill MD  Cardiology   [facility]

## 2019-07-05 NOTE — SUBJECTIVE & OBJECTIVE
Past Medical History:   Diagnosis Date    Anticoagulant long-term use     Cardiomegaly     Chronic combined systolic and diastolic congestive heart failure     Coronary artery disease     Heart attack 2006    Hypertension     Hyperthyroidism, subclinical 1/2/2013    MI (myocardial infarction) 9/22/2013    MI in 2009      Paroxysmal atrial fibrillation     PE (pulmonary embolism) 1/1/2013    IN 2010     S/P ablation of atrial flutter 2008    Stroke 2009    no residual weaknesses       Past Surgical History:   Procedure Laterality Date    RADIOFREQUENCY ABLATION  01/08/2008    for atrial flutter       Review of patient's allergies indicates:   Allergen Reactions    Acetaminophen      Itching    Oxycodone-acetaminophen      Other reaction(s): Itching    Ace inhibitors Other (See Comments)     cough       No current facility-administered medications on file prior to encounter.      Current Outpatient Medications on File Prior to Encounter   Medication Sig    albuterol (PROVENTIL/VENTOLIN HFA) 90 mcg/actuation inhaler Inhale 1-2 puffs into the lungs every 6 (six) hours as needed for Wheezing. Rescue    aspirin (ECOTRIN) 81 MG EC tablet Take 81 mg by mouth once daily.    calcium carbonate (TUMS) 200 mg calcium (500 mg) chewable tablet Take 1 tablet (500 mg total) by mouth 3 (three) times daily as needed.    ferrous sulfate 325 (65 FE) MG EC tablet Take 1 tablet (325 mg total) by mouth once daily.    furosemide (LASIX) 40 MG tablet Take 80 mg by mouth 2 (two) times daily.    HYDROcodone-acetaminophen (NORCO) 5-325 mg per tablet Take 1 tablet by mouth every 6 (six) hours as needed for Pain (severe pain preventing sleep).    losartan (COZAAR) 25 MG tablet Take 0.5 tablets (12.5 mg total) by mouth once daily.    metoprolol succinate (TOPROL-XL) 25 MG 24 hr tablet Take 1 tablet (25 mg total) by mouth once daily.    nitroGLYCERIN (NITROSTAT) 0.4 MG SL tablet Place 1 tablet (0.4 mg total) under the  tongue every 5 (five) minutes as needed for Chest pain. Tablet, Sublingual Sublingual    pantoprazole (PROTONIX) 40 MG tablet Take 1 tablet (40 mg total) by mouth once daily.    warfarin (COUMADIN) 6 MG tablet Take 1 tablet (6 mg total) by mouth Daily.     Family History     Problem Relation (Age of Onset)    Alcohol abuse Father    Hypertension Mother, Father, Sister, Brother    Stroke Mother        Tobacco Use    Smoking status: Never Smoker    Smokeless tobacco: Never Used   Substance and Sexual Activity    Alcohol use: No    Drug use: No    Sexual activity: Never     Review of Systems   Constitutional: Negative for chills, fever and unexpected weight change.   HENT: Negative for trouble swallowing and voice change.    Eyes: Negative for photophobia and visual disturbance.   Respiratory: Negative for chest tightness and shortness of breath.    Cardiovascular: Negative for chest pain.   Gastrointestinal: Negative for constipation, diarrhea, nausea and vomiting.   Endocrine: Negative for polydipsia and polyuria.   Genitourinary: Negative for difficulty urinating and dysuria.   Musculoskeletal: Positive for gait problem. Negative for neck pain and neck stiffness.   Skin: Positive for rash and wound.   Allergic/Immunologic: Negative for immunocompromised state.   Neurological: Positive for weakness and numbness. Negative for dizziness, tremors, seizures, syncope, speech difficulty, light-headedness and headaches.   Psychiatric/Behavioral: Negative for agitation and confusion.     Objective:     Vital Signs (Most Recent):  Temp: 97.5 °F (36.4 °C) (07/05/19 1520)  Pulse: 72 (07/05/19 1520)  Resp: 18 (07/05/19 1520)  BP: 136/60 (07/05/19 1520)  SpO2: 96 % (07/05/19 1520) Vital Signs (24h Range):  Temp:  [97.5 °F (36.4 °C)-98.3 °F (36.8 °C)] 97.5 °F (36.4 °C)  Pulse:  [] 72  Resp:  [18-20] 18  SpO2:  [96 %-100 %] 96 %  BP: (110-136)/(51-65) 136/60     Weight: 118.4 kg (261 lb)  Body mass index is 38.54  kg/m².    Physical Exam   Constitutional:   Pt refused physical exam   Neurological: He is alert.   Pt refused physical exam beyond basic questions.     Significant Labs:   CBC:   Recent Labs   Lab 07/04/19  0429 07/05/19  0856   WBC 10.10 10.21   HGB 7.4* 7.4*   HCT 25.5* 25.0*    251     CMP:   Recent Labs   Lab 07/04/19  0429 07/04/19  1610 07/05/19  0856   GLU 87 97 143*    136 136   K 4.5 3.6 3.8    97 99   CO2 25 27 25   BUN 21* 28* 33*   CREATININE 1.7* 1.9* 1.9*   CALCIUM 9.3 9.4 9.2   MG 1.7 1.8 1.8   PROT 8.1  --  7.8   ALBUMIN 3.1*  --  3.0*   BILITOT 2.3*  --  1.9*   ALKPHOS 187*  --  169*   AST 15  --  16   ALT 5*  --  7*   ANIONGAP 11 12 12   EGFRNONAA 45.4* 39.7* 39.7*     Urine Studies: No results for input(s): COLORU, APPEARANCEUA, PHUR, SPECGRAV, PROTEINUA, GLUCUA, KETONESU, BILIRUBINUA, OCCULTUA, NITRITE, UROBILINOGEN, LEUKOCYTESUR, RBCUA, WBCUA, BACTERIA, SQUAMEPITHEL, HYALINECASTS in the last 48 hours.    Invalid input(s): WRIGHTSUR  All pertinent lab results from the past 24 hours have been reviewed.    Significant Imaging: I have reviewed all pertinent imaging results/findings within the past 24 hours.

## 2019-07-05 NOTE — CONSULTS
[facility]  Neurology  Consult Note    Patient Name: Hamlet Terrell  MRN: [id number]  Admission Date: 7/2/2019  Hospital Length of Stay: 1 days  Code Status: Full Code   Attending Provider: José Luis Langford MD   Consulting Provider: Tru Tarango MD  Primary Care Physician: Carlin Swift MD  Principal Problem:Acute on chronic combined systolic and diastolic heart failure    Inpatient consult to Neurology  Consult performed by: Tru Tarango MD  Consult ordered by: José Luis Langford MD         Subjective:     Chief Complaint:  RLE weakness    HPI:   Hamlet Terrell is a 53 year old male with a medical history significant for combined CHF (EF 23& with diastolic dysfunction), HTN, Afib on chronic coumadin anticoagulation, CAD and prior CVA in 2009 with documented residual right sided weakness who presented to [facility] on 7/2/19 for evaluation of right shoulder pain and RLU weakness. Pt states that he woke up approximately 4 days prior to presentation with acute onset RLE weakness. Pt states that he has no sensation below ankle on right. Pt states that he attempted to move around his house and hurt his shoulder in the process. Pt denies any prior injury to his RLE and states that his prior weakness resolved 3 days after his prior CVA. Pt has not been on ASA/Plavix or Statin per his history.     Neurology is consulted for evaluation and management of unilateral polyneuropathy/neuralgia of unclear etiology    On evaluation, pt refuses to participate with examination. Pt is covered with a blanket and refuses to move his RUE, RLE or head for further examination. A MRI of Brain from earlier in admission show chronic left frontal and parietal lobes infarction as well as chronic right cerebellar punctate infarctions. MRI of spine shows degenerative changes and diffuse osteophyte disease but is limited due to motion.     Past Medical History:   Diagnosis Date    Anticoagulant long-term use      Cardiomegaly     Chronic combined systolic and diastolic congestive heart failure     Coronary artery disease     Heart attack 2006    Hypertension     Hyperthyroidism, subclinical 1/2/2013    MI (myocardial infarction) 9/22/2013    MI in 2009      Paroxysmal atrial fibrillation     PE (pulmonary embolism) 1/1/2013    IN 2010     S/P ablation of atrial flutter 2008    Stroke 2009    no residual weaknesses       Past Surgical History:   Procedure Laterality Date    RADIOFREQUENCY ABLATION  01/08/2008    for atrial flutter       Review of patient's allergies indicates:   Allergen Reactions    Acetaminophen      Itching    Oxycodone-acetaminophen      Other reaction(s): Itching    Ace inhibitors Other (See Comments)     cough       No current facility-administered medications on file prior to encounter.      Current Outpatient Medications on File Prior to Encounter   Medication Sig    albuterol (PROVENTIL/VENTOLIN HFA) 90 mcg/actuation inhaler Inhale 1-2 puffs into the lungs every 6 (six) hours as needed for Wheezing. Rescue    aspirin (ECOTRIN) 81 MG EC tablet Take 81 mg by mouth once daily.    calcium carbonate (TUMS) 200 mg calcium (500 mg) chewable tablet Take 1 tablet (500 mg total) by mouth 3 (three) times daily as needed.    ferrous sulfate 325 (65 FE) MG EC tablet Take 1 tablet (325 mg total) by mouth once daily.    furosemide (LASIX) 40 MG tablet Take 80 mg by mouth 2 (two) times daily.    HYDROcodone-acetaminophen (NORCO) 5-325 mg per tablet Take 1 tablet by mouth every 6 (six) hours as needed for Pain (severe pain preventing sleep).    losartan (COZAAR) 25 MG tablet Take 0.5 tablets (12.5 mg total) by mouth once daily.    metoprolol succinate (TOPROL-XL) 25 MG 24 hr tablet Take 1 tablet (25 mg total) by mouth once daily.    nitroGLYCERIN (NITROSTAT) 0.4 MG SL tablet Place 1 tablet (0.4 mg total) under the tongue every 5 (five) minutes as needed for Chest pain. Tablet, Sublingual  Sublingual    pantoprazole (PROTONIX) 40 MG tablet Take 1 tablet (40 mg total) by mouth once daily.    warfarin (COUMADIN) 6 MG tablet Take 1 tablet (6 mg total) by mouth Daily.     Family History     Problem Relation (Age of Onset)    Alcohol abuse Father    Hypertension Mother, Father, Sister, Brother    Stroke Mother        Tobacco Use    Smoking status: Never Smoker    Smokeless tobacco: Never Used   Substance and Sexual Activity    Alcohol use: No    Drug use: No    Sexual activity: Never     Review of Systems   Constitutional: Negative for chills, fever and unexpected weight change.   HENT: Negative for trouble swallowing and voice change.    Eyes: Negative for photophobia and visual disturbance.   Respiratory: Negative for chest tightness and shortness of breath.    Cardiovascular: Negative for chest pain.   Gastrointestinal: Negative for constipation, diarrhea, nausea and vomiting.   Endocrine: Negative for polydipsia and polyuria.   Genitourinary: Negative for difficulty urinating and dysuria.   Musculoskeletal: Positive for gait problem. Negative for neck pain and neck stiffness.   Skin: Positive for rash and wound.   Allergic/Immunologic: Negative for immunocompromised state.   Neurological: Positive for weakness and numbness. Negative for dizziness, tremors, seizures, syncope, speech difficulty, light-headedness and headaches.   Psychiatric/Behavioral: Negative for agitation and confusion.     Objective:     Vital Signs (Most Recent):  Temp: 97.5 °F (36.4 °C) (07/05/19 1520)  Pulse: 72 (07/05/19 1520)  Resp: 18 (07/05/19 1520)  BP: 136/60 (07/05/19 1520)  SpO2: 96 % (07/05/19 1520) Vital Signs (24h Range):  Temp:  [97.5 °F (36.4 °C)-98.3 °F (36.8 °C)] 97.5 °F (36.4 °C)  Pulse:  [] 72  Resp:  [18-20] 18  SpO2:  [96 %-100 %] 96 %  BP: (110-136)/(51-65) 136/60     Weight: 118.4 kg (261 lb)  Body mass index is 38.54 kg/m².    Physical Exam   Constitutional:   Pt refused physical exam    Neurological: He is alert.   Pt refused physical exam beyond basic questions.     Significant Labs:   CBC:   Recent Labs   Lab 07/04/19  0429 07/05/19  0856   WBC 10.10 10.21   HGB 7.4* 7.4*   HCT 25.5* 25.0*    251     CMP:   Recent Labs   Lab 07/04/19  0429 07/04/19  1610 07/05/19  0856   GLU 87 97 143*    136 136   K 4.5 3.6 3.8    97 99   CO2 25 27 25   BUN 21* 28* 33*   CREATININE 1.7* 1.9* 1.9*   CALCIUM 9.3 9.4 9.2   MG 1.7 1.8 1.8   PROT 8.1  --  7.8   ALBUMIN 3.1*  --  3.0*   BILITOT 2.3*  --  1.9*   ALKPHOS 187*  --  169*   AST 15  --  16   ALT 5*  --  7*   ANIONGAP 11 12 12   EGFRNONAA 45.4* 39.7* 39.7*     Urine Studies: No results for input(s): COLORU, APPEARANCEUA, PHUR, SPECGRAV, PROTEINUA, GLUCUA, KETONESU, BILIRUBINUA, OCCULTUA, NITRITE, UROBILINOGEN, LEUKOCYTESUR, RBCUA, WBCUA, BACTERIA, SQUAMEPITHEL, HYALINECASTS in the last 48 hours.    Invalid input(s): WRIGHTSUR  All pertinent lab results from the past 24 hours have been reviewed.    Significant Imaging: I have reviewed all pertinent imaging results/findings within the past 24 hours.    Assessment and Plan:     Weakness of lower extremity  53 year old male with history of CHF (EF 23& with diastolic dysfunction), HTN, Afib on coumadin, CAD and prior CVA in 2009 with documented residual right sided weakness who presented to [facility] for evaluation of right shoulder pain and acute onset RLE weakness 4 days prior to presentation. Neurology is consulted for evaluation and management of unilateral polyneuropathy/neuralgia of unclear etiology    - Pt denies any prior injury to his RLE and states that his prior weakness resolved 3 days after his prior CVA.   - MRI of Brain from earlier in admission show chronic left frontal and parietal lobes infarction as well as chronic right cerebellar punctate infarctions.   - MRI of spine shows degenerative changes and diffuse osteophyte disease but is limited due to motion.  - Per primary team  note, pt was able to walk to bathroom this afternoon by himself.     Plan  - Pt refused to participate in neurologic exam, based on history and documentation from primary team, feel that this is less likely to be neurologic in origin.   - Recommend completing MRI on shoulder to assess for MSK origin for pain  - Due to refusal to be examined, will sign off at this time. Please call back if situation changes.              VTE Risk Mitigation (From admission, onward)        Ordered     warfarin (COUMADIN) tablet 6 mg  Daily      07/05/19 1807     IP VTE HIGH RISK PATIENT  Once      07/03/19 0955     Place sequential compression device  Until discontinued      07/03/19 0955     Place CAMI hose  Until discontinued      07/03/19 0841          Thank you for your consult. I will sign off. Please contact us if you have any additional questions.    Tru Tarango MD  Neurology  [facility]

## 2019-07-05 NOTE — PROGRESS NOTES
SANNA Ingram PA-C notified of patient refusal of care. Patient IV infiltrated; lasix gtt on hold.  Charge RN at bedside to assist patient with IV initiation;  Pt not cooperating. Patient educated on the importance of the need for IV.   Patient states he doesn't care what happens to him and he is not going to die.    MRI scheduled; pt refuses to go for MRI. LOUIS will put in communication for  to run in hand.      7/5/2019 0032  SANNA Ingram PA-C notified patient has no IV access.  Patient states he doesn't want to get an IV.  Patient educated on the importance of  gtt.  Patient says he doesn't care.  Patient non compliant with fluid restriction.  Patient had 3 OJ's at bedside and 1 milk before asking for 3 more milks.  No new orders will continue to monitor.

## 2019-07-06 LAB
ALBUMIN SERPL BCP-MCNC: 3 G/DL (ref 3.5–5.2)
ALP SERPL-CCNC: 170 U/L (ref 55–135)
ALT SERPL W/O P-5'-P-CCNC: 8 U/L (ref 10–44)
ANION GAP SERPL CALC-SCNC: 11 MMOL/L (ref 8–16)
AST SERPL-CCNC: 17 U/L (ref 10–40)
BASOPHILS # BLD AUTO: 0.03 K/UL (ref 0–0.2)
BASOPHILS NFR BLD: 0.3 % (ref 0–1.9)
BILIRUB SERPL-MCNC: 1.4 MG/DL (ref 0.1–1)
BILIRUB UR QL STRIP: NEGATIVE
BUN SERPL-MCNC: 38 MG/DL (ref 6–20)
CALCIUM SERPL-MCNC: 9.6 MG/DL (ref 8.7–10.5)
CHLORIDE SERPL-SCNC: 96 MMOL/L (ref 95–110)
CLARITY UR REFRACT.AUTO: CLEAR
CO2 SERPL-SCNC: 28 MMOL/L (ref 23–29)
COLOR UR AUTO: NORMAL
CREAT SERPL-MCNC: 1.9 MG/DL (ref 0.5–1.4)
DIFFERENTIAL METHOD: ABNORMAL
EOSINOPHIL # BLD AUTO: 0.6 K/UL (ref 0–0.5)
EOSINOPHIL NFR BLD: 6.3 % (ref 0–8)
ERYTHROCYTE [DISTWIDTH] IN BLOOD BY AUTOMATED COUNT: 16.3 % (ref 11.5–14.5)
EST. GFR  (AFRICAN AMERICAN): 45.8 ML/MIN/1.73 M^2
EST. GFR  (NON AFRICAN AMERICAN): 39.7 ML/MIN/1.73 M^2
GLUCOSE SERPL-MCNC: 89 MG/DL (ref 70–110)
GLUCOSE UR QL STRIP: NEGATIVE
HCT VFR BLD AUTO: 24.7 % (ref 40–54)
HGB BLD-MCNC: 7.3 G/DL (ref 14–18)
HGB UR QL STRIP: NEGATIVE
IMM GRANULOCYTES # BLD AUTO: 0.03 K/UL (ref 0–0.04)
IMM GRANULOCYTES NFR BLD AUTO: 0.3 % (ref 0–0.5)
INR PPP: 1.1 (ref 0.8–1.2)
KETONES UR QL STRIP: NEGATIVE
LEUKOCYTE ESTERASE UR QL STRIP: NEGATIVE
LYMPHOCYTES # BLD AUTO: 0.8 K/UL (ref 1–4.8)
LYMPHOCYTES NFR BLD: 8.8 % (ref 18–48)
MAGNESIUM SERPL-MCNC: 2 MG/DL (ref 1.6–2.6)
MCH RBC QN AUTO: 23.7 PG (ref 27–31)
MCHC RBC AUTO-ENTMCNC: 29.6 G/DL (ref 32–36)
MCV RBC AUTO: 80 FL (ref 82–98)
MONOCYTES # BLD AUTO: 1.3 K/UL (ref 0.3–1)
MONOCYTES NFR BLD: 14.7 % (ref 4–15)
NEUTROPHILS # BLD AUTO: 6.2 K/UL (ref 1.8–7.7)
NEUTROPHILS NFR BLD: 69.6 % (ref 38–73)
NITRITE UR QL STRIP: NEGATIVE
NRBC BLD-RTO: 0 /100 WBC
PH UR STRIP: 5 [PH] (ref 5–8)
PLATELET # BLD AUTO: 300 K/UL (ref 150–350)
PMV BLD AUTO: 9.5 FL (ref 9.2–12.9)
POTASSIUM SERPL-SCNC: 4 MMOL/L (ref 3.5–5.1)
PROT SERPL-MCNC: 8.2 G/DL (ref 6–8.4)
PROT UR QL STRIP: NEGATIVE
PROTHROMBIN TIME: 11.4 SEC (ref 9–12.5)
RBC # BLD AUTO: 3.08 M/UL (ref 4.6–6.2)
SODIUM SERPL-SCNC: 135 MMOL/L (ref 136–145)
SP GR UR STRIP: 1 (ref 1–1.03)
URN SPEC COLLECT METH UR: NORMAL
WBC # BLD AUTO: 8.94 K/UL (ref 3.9–12.7)

## 2019-07-06 PROCEDURE — 99233 SBSQ HOSP IP/OBS HIGH 50: CPT | Mod: ,,, | Performed by: HOSPITALIST

## 2019-07-06 PROCEDURE — 36415 COLL VENOUS BLD VENIPUNCTURE: CPT

## 2019-07-06 PROCEDURE — 85610 PROTHROMBIN TIME: CPT

## 2019-07-06 PROCEDURE — 25000003 PHARM REV CODE 250: Performed by: HOSPITALIST

## 2019-07-06 PROCEDURE — 99233 PR SUBSEQUENT HOSPITAL CARE,LEVL III: ICD-10-PCS | Mod: ,,, | Performed by: HOSPITALIST

## 2019-07-06 PROCEDURE — 99232 SBSQ HOSP IP/OBS MODERATE 35: CPT | Mod: ,,, | Performed by: INTERNAL MEDICINE

## 2019-07-06 PROCEDURE — 25000003 PHARM REV CODE 250: Performed by: STUDENT IN AN ORGANIZED HEALTH CARE EDUCATION/TRAINING PROGRAM

## 2019-07-06 PROCEDURE — 20600001 HC STEP DOWN PRIVATE ROOM

## 2019-07-06 PROCEDURE — 63600175 PHARM REV CODE 636 W HCPCS: Performed by: HOSPITALIST

## 2019-07-06 PROCEDURE — 80053 COMPREHEN METABOLIC PANEL: CPT

## 2019-07-06 PROCEDURE — 99232 PR SUBSEQUENT HOSPITAL CARE,LEVL II: ICD-10-PCS | Mod: ,,, | Performed by: INTERNAL MEDICINE

## 2019-07-06 PROCEDURE — 25000003 PHARM REV CODE 250: Performed by: PHYSICIAN ASSISTANT

## 2019-07-06 PROCEDURE — 83735 ASSAY OF MAGNESIUM: CPT

## 2019-07-06 PROCEDURE — 85025 COMPLETE CBC W/AUTO DIFF WBC: CPT

## 2019-07-06 RX ORDER — SENNOSIDES 8.6 MG/1
8.6 TABLET ORAL DAILY
Status: DISCONTINUED | OUTPATIENT
Start: 2019-07-06 | End: 2019-07-10

## 2019-07-06 RX ORDER — GABAPENTIN 300 MG/1
300 CAPSULE ORAL 2 TIMES DAILY
Status: DISCONTINUED | OUTPATIENT
Start: 2019-07-06 | End: 2019-07-06

## 2019-07-06 RX ORDER — WARFARIN 10 MG/1
10 TABLET ORAL DAILY
Status: DISCONTINUED | OUTPATIENT
Start: 2019-07-06 | End: 2019-07-10

## 2019-07-06 RX ORDER — MIDAZOLAM HYDROCHLORIDE 1 MG/ML
1 INJECTION INTRAMUSCULAR; INTRAVENOUS
Status: DISCONTINUED | OUTPATIENT
Start: 2019-07-06 | End: 2019-07-07

## 2019-07-06 RX ORDER — POLYETHYLENE GLYCOL 3350 17 G/17G
17 POWDER, FOR SOLUTION ORAL 2 TIMES DAILY
Status: DISCONTINUED | OUTPATIENT
Start: 2019-07-06 | End: 2019-07-18 | Stop reason: HOSPADM

## 2019-07-06 RX ORDER — FUROSEMIDE 10 MG/ML
120 INJECTION INTRAMUSCULAR; INTRAVENOUS ONCE
Status: COMPLETED | OUTPATIENT
Start: 2019-07-06 | End: 2019-07-06

## 2019-07-06 RX ORDER — GABAPENTIN 250 MG/5ML
300 SOLUTION ORAL 2 TIMES DAILY
Status: DISCONTINUED | OUTPATIENT
Start: 2019-07-06 | End: 2019-07-10

## 2019-07-06 RX ORDER — HYDROMORPHONE HYDROCHLORIDE 1 MG/ML
2 INJECTION, SOLUTION INTRAMUSCULAR; INTRAVENOUS; SUBCUTANEOUS ONCE
Status: COMPLETED | OUTPATIENT
Start: 2019-07-06 | End: 2019-07-06

## 2019-07-06 RX ADMIN — METOPROLOL SUCCINATE 25 MG: 25 TABLET, EXTENDED RELEASE ORAL at 08:07

## 2019-07-06 RX ADMIN — ASPIRIN 81 MG: 81 TABLET, COATED ORAL at 08:07

## 2019-07-06 RX ADMIN — METOLAZONE 5 MG: 5 TABLET ORAL at 08:07

## 2019-07-06 RX ADMIN — HYDROMORPHONE HYDROCHLORIDE 1 MG: 1 INJECTION, SOLUTION INTRAMUSCULAR; INTRAVENOUS; SUBCUTANEOUS at 08:07

## 2019-07-06 RX ADMIN — FUROSEMIDE 120 MG: 10 INJECTION, SOLUTION INTRAMUSCULAR; INTRAVENOUS at 03:07

## 2019-07-06 RX ADMIN — HYDRALAZINE HYDROCHLORIDE AND ISOSORBIDE DINITRATE 1 TABLET: 37.5; 2 TABLET, FILM COATED ORAL at 08:07

## 2019-07-06 RX ADMIN — FUROSEMIDE 120 MG: 10 INJECTION, SOLUTION INTRAMUSCULAR; INTRAVENOUS at 08:07

## 2019-07-06 RX ADMIN — HYDROMORPHONE HYDROCHLORIDE 2 MG: 1 INJECTION, SOLUTION INTRAMUSCULAR; INTRAVENOUS; SUBCUTANEOUS at 03:07

## 2019-07-06 RX ADMIN — WARFARIN SODIUM 10 MG: 10 TABLET ORAL at 06:07

## 2019-07-06 RX ADMIN — ENOXAPARIN SODIUM 120 MG: 150 INJECTION SUBCUTANEOUS at 10:07

## 2019-07-06 RX ADMIN — FUROSEMIDE 10 MG/HR: 10 INJECTION, SOLUTION INTRAMUSCULAR; INTRAVENOUS at 03:07

## 2019-07-06 RX ADMIN — MIDAZOLAM HYDROCHLORIDE 1 MG: 1 INJECTION, SOLUTION INTRAMUSCULAR; INTRAVENOUS at 12:07

## 2019-07-06 RX ADMIN — LIDOCAINE 3 PATCH: 50 PATCH TOPICAL at 10:07

## 2019-07-06 RX ADMIN — HYDROMORPHONE HYDROCHLORIDE 1 MG: 1 INJECTION, SOLUTION INTRAMUSCULAR; INTRAVENOUS; SUBCUTANEOUS at 06:07

## 2019-07-06 RX ADMIN — MENTHOL, METHYL SALICYLATE: 10; 15 CREAM TOPICAL at 03:07

## 2019-07-06 RX ADMIN — PANTOPRAZOLE SODIUM 40 MG: 40 TABLET, DELAYED RELEASE ORAL at 08:07

## 2019-07-06 RX ADMIN — MENTHOL, METHYL SALICYLATE: 10; 15 CREAM TOPICAL at 01:07

## 2019-07-06 NOTE — NURSING
Pt unable to complete MRI successfully, pts nurse Alisha aware & transported pt back to room via WC, pt verbalized he is in too much pain & can't breathe when he lays flat

## 2019-07-06 NOTE — PLAN OF CARE
Problem: Adult Inpatient Plan of Care  Goal: Plan of Care Review  Plan of care discussed with patient. Patient is free of fall/trauma/injury. Denies CP, SOB. Patient had MRI of right shoulder today.  Administered 120 lasix bolus prior to starting lasix gtt running at a rate of 5ml/hr. Patient diuresing well. Strict I and Os and daily weights. Patient continues to complain of Right shoulder pain and continues to request for stronger pain medication. Refused echo as well today. VSS on room air. All questions addressed. Will continue to monitor

## 2019-07-06 NOTE — PROGRESS NOTES
Patient refused senna and miralax. Educated him on the purpose of the medication. He continued to refuse. Notified MD. Will continue to monitor.

## 2019-07-06 NOTE — NURSING
New orders received to initiate Lovenox and to remove Miguel A hose at night to prevent skin injury; pt refused both; educated on rationale for both; continue to refuse stating that he stopped taking Lovenox months ago and that nothing is wrong with his skin; note to MD to address

## 2019-07-06 NOTE — PROGRESS NOTES
Pt returned to room. Pt AAOx4. Pt stable. No complaints of pain or signs of distress. Will continue to monitor pt.

## 2019-07-06 NOTE — NURSING
Alisha ORTIZ unable to waste in MRI poonam, RN verbalized she will waste other 1mg IV Versed with nurse upstairs on her unit

## 2019-07-06 NOTE — SUBJECTIVE & OBJECTIVE
Interval History:   Patient refusing Lovenox, MRI, issues with documenting PO intake. This morning more cooperative, understands why he needs to be on bridge therapy    Review of Systems   Constitution: Negative for chills, decreased appetite, diaphoresis, fever, weight gain and weight loss.   Eyes: Negative for blurred vision.   Cardiovascular: Negative for chest pain, dyspnea on exertion, irregular heartbeat, leg swelling, near-syncope, orthopnea and palpitations.   Respiratory: Negative for cough, shortness of breath, snoring and wheezing.    Musculoskeletal: Positive for muscle weakness and myalgias.   Gastrointestinal: Negative for abdominal pain, nausea and vomiting.   Genitourinary: Negative for bladder incontinence and urgency.     Objective:     Vital Signs (Most Recent):  Temp: 99.4 °F (37.4 °C) (07/06/19 0816)  Pulse: 72 (07/06/19 0816)  Resp: 16 (07/06/19 0816)  BP: (!) 111/58 (07/06/19 0816)  SpO2: 96 % (07/06/19 0816) Vital Signs (24h Range):  Temp:  [97.5 °F (36.4 °C)-99.4 °F (37.4 °C)] 99.4 °F (37.4 °C)  Pulse:  [] 72  Resp:  [16-20] 16  SpO2:  [93 %-99 %] 96 %  BP: (100-136)/(58-74) 111/58     Weight: 118.4 kg (261 lb)  Body mass index is 38.54 kg/m².     SpO2: 96 %  O2 Device (Oxygen Therapy): room air      Intake/Output Summary (Last 24 hours) at 7/6/2019 1014  Last data filed at 7/6/2019 0600  Gross per 24 hour   Intake --   Output 1400 ml   Net -1400 ml       Lines/Drains/Airways     Peripheral Intravenous Line                 Peripheral IV - Single Lumen 07/05/19 1545 20 G Left Hand less than 1 day                Physical Exam   Constitutional: He is oriented to person, place, and time. He appears well-developed and well-nourished.   HENT:   Head: Normocephalic and atraumatic.   Eyes: Pupils are equal, round, and reactive to light. Conjunctivae and EOM are normal.   Neck: JVD present.   JVD to manible     Cardiovascular: Normal rate, normal heart sounds and intact distal pulses. An  irregularly irregular rhythm present. Frequent extrasystoles are present. Exam reveals no gallop and no friction rub.   No murmur heard.  Pulmonary/Chest: Effort normal. No stridor. He has no wheezes. He has rales. He exhibits no tenderness.   Abdominal: Soft. Bowel sounds are normal. He exhibits no distension and no mass. There is no tenderness. There is no guarding.   Musculoskeletal: He exhibits edema. He exhibits no tenderness or deformity.   Neurological: He is alert and oriented to person, place, and time. No sensory deficit.   Skin: Skin is warm and dry. No rash noted. No erythema.   Psychiatric: He has a normal mood and affect.       Significant Labs:   CMP   Recent Labs   Lab 07/05/19  0856 07/05/19 2055 07/06/19  0359    136 135*   K 3.8 3.9 4.0   CL 99 99 96   CO2 25 23 28   * 139* 89   BUN 33* 38* 38*   CREATININE 1.9* 2.0* 1.9*   CALCIUM 9.2 9.4 9.6   PROT 7.8 8.6* 8.2   ALBUMIN 3.0* 3.2* 3.0*   BILITOT 1.9* 1.7* 1.4*   ALKPHOS 169* 177* 170*   AST 16 19 17   ALT 7* 7* 8*   ANIONGAP 12 14 11   ESTGFRAFRICA 45.8* 43.1* 45.8*   EGFRNONAA 39.7* 37.3* 39.7*   , CBC   Recent Labs   Lab 07/05/19  0856 07/06/19 0359   WBC 10.21 8.94   HGB 7.4* 7.3*   HCT 25.0* 24.7*    300   , INR   Recent Labs   Lab 07/05/19  0856 07/06/19 0359   INR 1.2 1.1    and Lipid Panel No results for input(s): CHOL, HDL, LDLCALC, TRIG, CHOLHDL in the last 48 hours.    Significant Imaging: Echocardiogram:   Transthoracic echo (TTE) complete (Cupid Only):   Results for orders placed or performed during the hospital encounter of 04/15/19   Transthoracic echo (TTE) complete (Cupid Only)   Result Value Ref Range    Ascending aorta 3.68 cm    STJ 3.01 cm    AV mean gradient 3.07 mmHg    Ao peak ozzie 1.15 m/s    Ao VTI 18.57 cm    IVRT 0.09 msec    IVS 1.21 (A) 0.6 - 1.1 cm    LA size 6.18 cm    Left Atrium Major Axis 7.29 cm    Left Atrium Minor Axis 7.51 cm    LVIDD 5.88 3.5 - 6.0 cm    LVIDS 5.12 (A) 2.1 - 4.0 cm     LVOT diameter 2.52 cm    LVOT peak VTI 9.64 cm    PW 1.21 (A) 0.6 - 1.1 cm    MV Peak E Karri 1.16 m/s    RA Major Axis 7.04 cm    RA Width 4.87 cm    RVDD 5.78 cm    Sinus 3.36 cm    TAPSE 1.56 cm    TR Max Karri 2.85 m/s    TDI LATERAL 0.06     TDI SEPTAL 0.04     LA WIDTH 5.16 cm    LV Diastolic Volume 172.13 mL    LV Systolic Volume 124.91 mL    RV S' 5.90 m/s    LVOT peak karri [id number] m/s    LV LATERAL E/E' RATIO 19.33     LV SEPTAL E/E' RATIO 29.00     FS 13 %    LA volume 200.54 cm3    LV mass 307.18 g    Left Ventricle Relative Wall Thickness 0.41 cm    AV valve area 2.59 cm2    AV Velocity Ratio 0.57     AV index (prosthetic) 0.52     Mean e' 0.05     LVOT area 4.99 cm2    LVOT stroke volume 48.06 cm3    AV peak gradient 5.29 mmHg    E/E' ratio 23.20     LV Systolic Volume Index 53.1 mL/m2    LV Diastolic Volume Index 73.24 mL/m2    LA Volume Index 85.3 mL/m2    LV Mass Index 130.7 g/m2    Triscuspid Valve Regurgitation Peak Gradient 32.49 mmHg    BSA 2.45 m2    Right Atrial Pressure (from IVC) 15 mmHg    TV rest pulmonary artery pressure 47 mmHg    Narrative    · Severely decreased left ventricular systolic function. The estimated   ejection fraction is 23%  · Eccentric left ventricular hypertrophy.  · Left ventricular diastolic dysfunction.  · Septal wall has abnormal motion.  · Local segmental wall motion abnormalities.  · Severe left atrial enlargement.  · Moderate right ventricular enlargement.  · Mildly reduced right ventricular systolic function.  · Severe right atrial enlargement.  · Moderate mitral regurgitation.  · Moderate tricuspid regurgitation.  · Elevated central venous pressure (15 mm Hg).  · The estimated PA systolic pressure is 47 mm Hg  · Pulmonary hypertension present.  · Trivial Pericardial effusion.

## 2019-07-06 NOTE — PROGRESS NOTES
[facility] Hospital Medicine  Progress Note    Patient Name: Hamlet Terrell  MRN: [id number]  Patient Class: IP- Inpatient   Admission Date: 7/2/2019  Length of Stay: 2 days  Attending Physician: José Luis Langford MD  Primary Care Provider: Carlin Swift MD    [facility] Medicine Team: [facility] José Luis Langford MD    Subjective:     Principal Problem:Acute on chronic combined systolic and diastolic heart failure    HPI:   54 y/o M with PMH combined CHF (last Echo 4/2019 with EF 23%, diastolic dysfuction, moderated MR and TR), HTN, chronic A. fib (on coumadin), A. flutter s/p ablation, CVA (in 2009), CAD, ?PE (patient reported), non-compliance, and drug seeking behavior presents c/o of right shoulder/arm pain following an injury from right leg weakness. Patient reports waking up to go to the restroom during the night approximately 4 days ago when he was unable to bare weight on right leg due to weakness. He attempted to get back in the bed and aggravated/injured his right shoulder climbing back into bed. He was hoping the pain would resolve on its own but it has persisted for past 4 days prompting him to come to ED. He reports he can move his right upper extremity but refuses to because he does not want to illicit the pain.  Pt denies fever, chills, appetite change, wt change, CP, SOB (at baseline, 2 pillow orthopnea), palpitations, increased leg swelling (chronic leg swelling and venous stasis changes), N/V/D, confusion, slurred speech, dysphagia, seizures, tremors, syncope.     Through chart review, patient seen in ED for various pain complaints in the past.      On 4/6/17 per Dr. Garza's note: chronic LE heel/ankle pain that initially was thought secondary to LE swelling, and possibly gout but as swelling decreased and no improvement with colchicine.  Pt initially receiving opiates, but this was titrated down and patient was started on Gabapentin, at 400mg BID.  MRI revealed no  structural abnormality, there was concern for possible left heel bursitis.  However pain appears out of proportion to exam and findings. Would not recommend any chronic opiate treatment for this pain.      Per discharge note on 4/24/19, patient did ask for a pain medication prescription on discharge, which he does not have an indication for.       On last hospital admission (5/14/19 - 5/17/19): Mr. Terrell's hospitalization was complicated by aggressive behavior and agitation, including threatening remarks made against provider. Patient complained of chest pain, workup for ACS was negative, and the pain improved with GI medications, which is consistent with the etiology of either hearburn/GERD or esophagitis. He was recommended to utilize a PPI and either Maalox or carafate, but on discussing this issue, he continued to escalate his behavior and consistently request narcotic pain medication. Patient threatening to return to ED if discharged. Security was present during discharge counseling session due to the threats.      In 2018, patient prescribed Pineola by 4 different prescribers at various times. Last opioid prescription written in May 2019 by an ER physician.     In the ED: Afebrile without leukocytosis on arrival. Hypertensive at 140s-150s/80s. Hgb 8.7 (baseline). ESR elevated >120, CRP 25.9. INR non-therapeutic (1.2). EKG with A. Fib. Alk Phos 236, bili 1.8. Tox screen positive for benzos and opiates. UA unremarkable. CT head without acute intracranial abnormalities. R shoulder XR with no acute fracture. MRI brain with chronic infarcts. Cervical, lumbar, and thoracic spine MRI limited due to artifact but showed chronic changes and C5-6 level demonstrates appearance of disc osteophyte disease mild anterior impression upon the dural sac without high-grade stenosis.  Foraminal evaluation is limited, there is suggestion of foraminal encroachment on the right.  Correlation for exiting nerve root symptomatology is  needed.     Hospital Course:   MSK/Neuro complaints  Patient completed MRI Brain and MRI entire spine that did not reveal acute finding to explain patient's unilateral symptoms.  Pt does have some abnormalities on spinal MRI as per report and HPI.     On hospital day 2, patient had a fever overnight, had same ongoing shoulder pain and reported inability to move right leg. Orthopedic surgery consulted to eval right shoulder for possible septic joint, s/p arthrocentesis, not enough fluid for cell count but fluid cultures pending, gram stain negative for organism.  Empiric Vanc & Zosyn started for one day.   MRI shoulder ordered but patient unable to tolerate on multiple attempts, and despite use of Versed 1mg pre-medication.     Neurology consulted, but on HD3, patient refused to comply with examination.     CHF  Patient on exam is notable for diffuse 4+ pitting edema below the knee and dependent areas of legs, concern for decompensated heart failure, his Creatinine was rising through initial hospitalization, and patient reported lack of diuretic response to 160mg IV lasix on admission.  In the past for his CHF exacerbations doses of 80mg IV lasix used.   Due to lack of diuretic response increased low grade Afib with RVR.  New ECho ordered. Tried on lasix drip and Dobutamine (6hrs) loss of IV access terminated therapy and ultimately cardiology consulted.     Due to interruptions in IV access and patient refusal at attempts, his Cr priti to 1.9.  Eventually started on bolus dose diuretics with intermittent metolazone, Cardiology recommending stopping dobutamine and switching to IV lasix infusion on 7/6.     Behavioral  Per HPI summary, during past admissions concerns raised by medical teams over pain seeking behavior, and also concerns in past over verbally abusive behavior toward medical staff.   This admission after IV access lost patient refused to allow overnight staff to attempt, refused PICC team to attempt,  "refused ECHO test numerous times on multiple days.  Patient would report not wanting to move into bed would want to stay in chair and stated he needed sleep.  On repeat discussions with other medical staff present pt would say he will participate and accept diagnostic test, but when checking in with nursing would hear of recurrent behaviors of refusing nursing assessments, refusing diagnostic tests.   Multiple attempts at PT/OT evals patient has declined.  When questioned patient will ask "when did that happen" or tell me he'll comply next time.     Interval History:   MRI re-ordered, given versed x 1, pt could not tolerate entire exam due to shoulder pain  Discussed with ECHo tech and reported refusal of ECHO multiple times today.  Patient initially denies this when confronted.  ECHO tech present with me at bedside when speaking to patient.     Will start on lasix drip this afternoon per cardiology, started on Bidil.     Continues on PRn dilaudid, start renal dosed gabapentin.       Nursing has reported that patient after receiving Versed 1mg pre MRI (same dose received in ED on admit for MRI brain/c/t/l-spine) patient commented that he may require more.     Per RN, Also a one time 2mg IV dilaudid dose given and patient noted to RN that he feels great, he feels high.       This afternoon when checking on patient required sternal rub and opening his eyes to check pupillary light reflex in order for patient to awaken, he did not have pinpoint pupils and it is unclear if patient excessively somnolent or volitionally did not want to interact.     Review of Systems   Constitutional: Negative for chills, diaphoresis and fever.   HENT: Negative for sore throat.    Eyes: Negative for visual disturbance.   Respiratory: Negative for shortness of breath.    Cardiovascular: Positive for leg swelling. Negative for chest pain and palpitations.   Gastrointestinal: Negative for abdominal pain, constipation and diarrhea.   Skin: " Negative for color change.     Objective:     Vital Signs (Most Recent):  Temp: 98.9 °F (37.2 °C) (07/06/19 1540)  Pulse: 71 (07/06/19 1540)  Resp: 16 (07/06/19 1540)  BP: (!) 113/54 (07/06/19 1540)  SpO2: (!) 93 % (07/06/19 1540) Vital Signs (24h Range):  Temp:  [97.6 °F (36.4 °C)-99.4 °F (37.4 °C)] 98.9 °F (37.2 °C)  Pulse:  [] 71  Resp:  [16-20] 16  SpO2:  [93 %-99 %] 93 %  BP: ()/(54-80) 113/54     Weight: 118.4 kg (261 lb)  Body mass index is 38.54 kg/m².    Intake/Output Summary (Last 24 hours) at 7/6/2019 1710  Last data filed at 7/6/2019 1400  Gross per 24 hour   Intake 300 ml   Output 2350 ml   Net -2050 ml      Physical Exam   HENT:   Mouth/Throat: No oropharyngeal exudate.   Eyes: Pupils are equal, round, and reactive to light. No scleral icterus.   Neck: JVD present.   Cardiovascular:   Irregular rhythm,  4+ Pitting edema to knee and dependent portion of thighs   Pulmonary/Chest: Effort normal and breath sounds normal. No stridor. No respiratory distress. He has no wheezes.   Abdominal: Soft. Bowel sounds are normal. He exhibits no distension. There is no tenderness.   Musculoskeletal: He exhibits edema.   Neurological:   Reflex Scores:       Bicep reflexes are 0 on the right side and 2+ on the left side.       Brachioradialis reflexes are 0 on the right side and 2+ on the left side.  Skin: Skin is warm and dry.       Significant Labs:   CMP:   Recent Labs   Lab 07/05/19  0856 07/05/19 2055 07/06/19  0359    136 135*   K 3.8 3.9 4.0   CL 99 99 96   CO2 25 23 28   * 139* 89   BUN 33* 38* 38*   CREATININE 1.9* 2.0* 1.9*   CALCIUM 9.2 9.4 9.6   PROT 7.8 8.6* 8.2   ALBUMIN 3.0* 3.2* 3.0*   BILITOT 1.9* 1.7* 1.4*   ALKPHOS 169* 177* 170*   AST 16 19 17   ALT 7* 7* 8*   ANIONGAP 12 14 11   EGFRNONAA 39.7* 37.3* 39.7*     Cardiac Markers:   No results for input(s): CKMB, MYOGLOBIN, BNP, TROPISTAT in the last 48 hours.  Lactic Acid:   No results for input(s): LACTATE in the last 48  hours.  Results for DIANN BARRERA (MRN [id number]) as of 7/4/2019 11:09   Ref. Range 7/4/2019 04:29   Uric Acid Latest Ref Range: 3.4 - 7.0 mg/dL 12.1 (H)     Results for DIANN BARRERA (MRN [id number]) as of 7/4/2019 11:09   Ref. Range 7/2/2019 22:15   Sed Rate Latest Ref Range: 0 - 23 mm/Hr >120 (H)       Significant Imaging: I have reviewed all pertinent imaging results/findings within the past 24 hours.    Assessment/Plan:      Acute on Chronic Combined systolic/diastolic heart failure   -given metolazone and 120mg IV lasix in AM, this afternoon 120mg IV lasix and start lasix drip  -Repeat ECHO ordered pending- pt refused again today       Right sided weakness & acute pain   -etiology remains unclear, s/p MRI Brain/C/T/L spine  -Patient has chronic elevation of Uric Acid, prior synovial fluid studies negative for gout/pseudogout crystals   -s/p Right shoulder arthrocentesis - gram stain negative, culture Aerobic - No Growth  -Patient refused exam for neurology consult - have asked them to see pt on 7/7.   -Lack of participation in medical care/testing is limiting clinical evaluations.   -Concern possible conversion d/o vs pain seeking   >pt c/o of intolerance to exam with light touch to areas of right sided extremities, but can tolerate getting to the bathroom (unwitnessed)  RN has seen patient raise up out of chair using both arms which would require shoulder extension    Febrile ILlness  -febrile on 7/4 @ 0500. Afebrile since.   -procal elevated, blood cultures and remain NGTD  -initial UA w/o signs of infection  -CXR w/o signs of new infiltrate  -Stopping Vanc & Zosyn   -synovial fluid shows no growth so far  -no repeat fevers    Atrial Fibrillation   -rate controlled on toprol   telemetry    JEAN-PIERRE on CKD   -see heart failure  -Urine studies ordered and pending  -bladder scan, if retaining will need monroe catheter  -may also need monroe   -Suspect cardiorenal syndrome. Delayed and inadequate diuresis to this  point. Urine lytes both FeNa 0.4% and FeUrea 14% represent Pre-renal etiology in this case.     Hx of VTE  -Coumadin @6mg   -subtherapeutic  -no plans for surgery - start Therapeutic Lovenox given high CHADS-VASC score  -patient has refused doses of lovenox intermittently     Anemia of iron deficiency  -normal ferritin but low serum iron and saturation on April studies  -senttype & screen     Active Diagnoses:    Diagnosis Date Noted POA    PRINCIPAL PROBLEM:  Acute on chronic combined systolic and diastolic heart failure [I50.43] 11/18/2013 Yes    Febrile illness, acute [R50.9] 07/04/2019 No    Acute pain of right shoulder due to trauma [M25.511, G89.11] 07/03/2019 Yes    Atrial fibrillation, chronic [I48.2] 01/02/2013 Yes     Chronic    Weakness of lower extremity [R29.898] 07/03/2019 Yes    JEAN-PIERRE (acute kidney injury) [N17.9] 06/03/2016 Yes    Shoulder pain, right [M25.511] 07/04/2019 Yes    Essential hypertension [I10] 01/02/2013 Yes     Chronic    Personal history of venous thrombosis and embolism [Z86.718] 09/22/2013 Not Applicable    Chronic anticoagulation [Z79.01] 01/01/2013 Not Applicable     Chronic    Chronic kidney disease [N18.9] 05/31/2016 Yes     Chronic    Lumbar back pain [M54.5] 07/06/2015 Yes    Venous stasis of lower extremity [I87.8] 05/15/2019 Yes    Neuropathy of both feet [G57.93] 07/03/2019 Yes    Prediabetes [R73.03] 07/03/2019 Yes    Elevated alkaline phosphatase level [R74.8] 12/10/2016 Yes    Non compliance w medication regimen [Z91.14] 01/01/2013 Not Applicable     Chronic    History of CVA (cerebrovascular accident) [Z86.73] 01/01/2013 Not Applicable      Problems Resolved During this Admission:     VTE Risk Mitigation (From admission, onward)        Ordered     warfarin (COUMADIN) tablet 10 mg  Daily      07/06/19 0949     enoxaparin injection 120 mg  Every 12 hours (non-standard times)      07/05/19 2210     IP VTE HIGH RISK PATIENT  Once      07/03/19 0955     Place  sequential compression device  Until discontinued      07/03/19 0955     Place CAMI hose  Until discontinued      07/03/19 0841             José Luis Langford MD  Department of Hospital Medicine   [facility]

## 2019-07-06 NOTE — NURSING
Received report from Tele that pt had 15 beatVtach; verified on monitor; pt sitting up in chair; AAOx4; NAD noted; denies CP/palpitations around time of event; latest labs noted from this AM;     /74 HR 75; dobutamine gtt recently D/C'd; due for isosorbide/hydralazine    Cardiology team on call paged to inform of above; pending return call    [phone] 0 return call; Med Team A paged; informed of above; will order labs and OK to administer due medications with current BP/HR

## 2019-07-06 NOTE — PROGRESS NOTES
[facility]  Cardiology  Progress Note    Patient Name: Hamlet Terrell  MRN: [id number]  Admission Date: 7/2/2019  Hospital Length of Stay: 2 days  Code Status: Full Code   Attending Physician: José Luis Langford MD   Primary Care Physician: Carlin Swift MD  Expected Discharge Date: 7/9/2019  Principal Problem:Acute on chronic combined systolic and diastolic heart failure    Subjective:     Hospital Course:   No notes on file    Interval History:   Patient refusing Lovenox, MRI, issues with documenting PO intake. This morning more cooperative, understands why he needs to be on bridge therapy    Review of Systems   Constitution: Negative for chills, decreased appetite, diaphoresis, fever, weight gain and weight loss.   Eyes: Negative for blurred vision.   Cardiovascular: Negative for chest pain, dyspnea on exertion, irregular heartbeat, leg swelling, near-syncope, orthopnea and palpitations.   Respiratory: Negative for cough, shortness of breath, snoring and wheezing.    Musculoskeletal: Positive for muscle weakness and myalgias.   Gastrointestinal: Negative for abdominal pain, nausea and vomiting.   Genitourinary: Negative for bladder incontinence and urgency.     Objective:     Vital Signs (Most Recent):  Temp: 99.4 °F (37.4 °C) (07/06/19 0816)  Pulse: 72 (07/06/19 0816)  Resp: 16 (07/06/19 0816)  BP: (!) 111/58 (07/06/19 0816)  SpO2: 96 % (07/06/19 0816) Vital Signs (24h Range):  Temp:  [97.5 °F (36.4 °C)-99.4 °F (37.4 °C)] 99.4 °F (37.4 °C)  Pulse:  [] 72  Resp:  [16-20] 16  SpO2:  [93 %-99 %] 96 %  BP: (100-136)/(58-74) 111/58     Weight: 118.4 kg (261 lb)  Body mass index is 38.54 kg/m².     SpO2: 96 %  O2 Device (Oxygen Therapy): room air      Intake/Output Summary (Last 24 hours) at 7/6/2019 1014  Last data filed at 7/6/2019 0600  Gross per 24 hour   Intake --   Output 1400 ml   Net -1400 ml       Lines/Drains/Airways     Peripheral Intravenous Line                 Peripheral IV -  Single Lumen 07/05/19 1545 20 G Left Hand less than 1 day                Physical Exam   Constitutional: He is oriented to person, place, and time. He appears well-developed and well-nourished.   HENT:   Head: Normocephalic and atraumatic.   Eyes: Pupils are equal, round, and reactive to light. Conjunctivae and EOM are normal.   Neck: JVD present.   JVD to manible     Cardiovascular: Normal rate, normal heart sounds and intact distal pulses. An irregularly irregular rhythm present. Frequent extrasystoles are present. Exam reveals no gallop and no friction rub.   No murmur heard.  Pulmonary/Chest: Effort normal. No stridor. He has no wheezes. He has rales. He exhibits no tenderness.   Abdominal: Soft. Bowel sounds are normal. He exhibits no distension and no mass. There is no tenderness. There is no guarding.   Musculoskeletal: He exhibits edema. He exhibits no tenderness or deformity.   Neurological: He is alert and oriented to person, place, and time. No sensory deficit.   Skin: Skin is warm and dry. No rash noted. No erythema.   Psychiatric: He has a normal mood and affect.       Significant Labs:   CMP   Recent Labs   Lab 07/05/19  0856 07/05/19 2055 07/06/19 0359    136 135*   K 3.8 3.9 4.0   CL 99 99 96   CO2 25 23 28   * 139* 89   BUN 33* 38* 38*   CREATININE 1.9* 2.0* 1.9*   CALCIUM 9.2 9.4 9.6   PROT 7.8 8.6* 8.2   ALBUMIN 3.0* 3.2* 3.0*   BILITOT 1.9* 1.7* 1.4*   ALKPHOS 169* 177* 170*   AST 16 19 17   ALT 7* 7* 8*   ANIONGAP 12 14 11   ESTGFRAFRICA 45.8* 43.1* 45.8*   EGFRNONAA 39.7* 37.3* 39.7*   , CBC   Recent Labs   Lab 07/05/19  0856 07/06/19 0359   WBC 10.21 8.94   HGB 7.4* 7.3*   HCT 25.0* 24.7*    300   , INR   Recent Labs   Lab 07/05/19  0856 07/06/19 0359   INR 1.2 1.1    and Lipid Panel No results for input(s): CHOL, HDL, LDLCALC, TRIG, CHOLHDL in the last 48 hours.    Significant Imaging: Echocardiogram:   Transthoracic echo (TTE) complete (Cupid Only):   Results for  orders placed or performed during the hospital encounter of 04/15/19   Transthoracic echo (TTE) complete (Cupid Only)   Result Value Ref Range    Ascending aorta 3.68 cm    STJ 3.01 cm    AV mean gradient 3.07 mmHg    Ao peak karri 1.15 m/s    Ao VTI 18.57 cm    IVRT 0.09 msec    IVS 1.21 (A) 0.6 - 1.1 cm    LA size 6.18 cm    Left Atrium Major Axis 7.29 cm    Left Atrium Minor Axis 7.51 cm    LVIDD 5.88 3.5 - 6.0 cm    LVIDS 5.12 (A) 2.1 - 4.0 cm    LVOT diameter 2.52 cm    LVOT peak VTI 9.64 cm    PW 1.21 (A) 0.6 - 1.1 cm    MV Peak E Karri 1.16 m/s    RA Major Axis 7.04 cm    RA Width 4.87 cm    RVDD 5.78 cm    Sinus 3.36 cm    TAPSE 1.56 cm    TR Max Karri 2.85 m/s    TDI LATERAL 0.06     TDI SEPTAL 0.04     LA WIDTH 5.16 cm    LV Diastolic Volume 172.13 mL    LV Systolic Volume 124.91 mL    RV S' 5.90 m/s    LVOT peak karri [id number] m/s    LV LATERAL E/E' RATIO 19.33     LV SEPTAL E/E' RATIO 29.00     FS 13 %    LA volume 200.54 cm3    LV mass 307.18 g    Left Ventricle Relative Wall Thickness 0.41 cm    AV valve area 2.59 cm2    AV Velocity Ratio 0.57     AV index (prosthetic) 0.52     Mean e' 0.05     LVOT area 4.99 cm2    LVOT stroke volume 48.06 cm3    AV peak gradient 5.29 mmHg    E/E' ratio 23.20     LV Systolic Volume Index 53.1 mL/m2    LV Diastolic Volume Index 73.24 mL/m2    LA Volume Index 85.3 mL/m2    LV Mass Index 130.7 g/m2    Triscuspid Valve Regurgitation Peak Gradient 32.49 mmHg    BSA 2.45 m2    Right Atrial Pressure (from IVC) 15 mmHg    TV rest pulmonary artery pressure 47 mmHg    Narrative    · Severely decreased left ventricular systolic function. The estimated   ejection fraction is 23%  · Eccentric left ventricular hypertrophy.  · Left ventricular diastolic dysfunction.  · Septal wall has abnormal motion.  · Local segmental wall motion abnormalities.  · Severe left atrial enlargement.  · Moderate right ventricular enlargement.  · Mildly reduced right ventricular systolic function.  ·  Severe right atrial enlargement.  · Moderate mitral regurgitation.  · Moderate tricuspid regurgitation.  · Elevated central venous pressure (15 mm Hg).  · The estimated PA systolic pressure is 47 mm Hg  · Pulmonary hypertension present.  · Trivial Pericardial effusion.        Assessment and Plan:     * Acute on chronic combined systolic and diastolic heart failure  ADHF in the setting of dietary, fluid and medication noncompliance   - Profile B, NYHA III,      - NICM, negative PET stress (2015)   - Most recent TTE demonstrates: LVef 23% with DD, with segment WMA, Moderate MR, Mod TR,    - Pro-    - Cardiology will continue to follow along, Thank you  - Poor UOP on Lasix bolus, RECOMMEND LASIX INFUSION, Lasix bolus (120mg) followed by Lasix gtt 10mg/hr  - Continue Toprol 25mg qday, Bidil 37.5-20 bid , ASA 81mg,   - Will consider Aldactone / ARB / ARNI once renal function stable and euvolemic   - Need to follow up with EP outpatient for ICD consideration   - Need to be evaluated by HTS outpatient if able to comply with medical management  - Fluid restriction at 1500 cc with strict I/Os and daily STANDING weights  - Pending echocardiogram  - Maintain on telemetry and daily EKGs   - Check Electrolytes, keep Mag >2 & K+ >4  - SCDs, TEDs, Nursing communication to elevated LE   - Ambulate as tolerated        Weakness of lower extremity  - Recommend further evaluation of his RLE weakness, possible CVA. Extremity warmth and nontender doubt embolic source.   - Recommend follow up with Neurology   - PT / OT to assess R shoulder and RLE   - Consider further imaging     Atrial fibrillation, chronic  Permanent Afib:    - CHADS-VASC 3  - History of CVA 2009 and presumed TIA on this admission, PATIENT NEEDS BRIDGING.   - Okay with Lovenox 1mg/kg BID with Coumadin, if patient does not want subq injection please switch to Heparin   - Continue home Toprol 25mg qday  - Goal INR 3 - 2   - Maintain K > 4, Mag > 2 and Ca/iCal WNL  to decrease arrhythmogenic potential  - Maintain on telemetry and daily morning EKGs for the next few days            VTE Risk Mitigation (From admission, onward)        Ordered     warfarin (COUMADIN) tablet 10 mg  Daily      07/06/19 0949     enoxaparin injection 120 mg  Every 12 hours (non-standard times)      07/05/19 2210     IP VTE HIGH RISK PATIENT  Once      07/03/19 0955     Place sequential compression device  Until discontinued      07/03/19 0955     Place CAMI hose  Until discontinued      07/03/19 0841          Amara Harman MD  Cardiology  [facility]

## 2019-07-06 NOTE — PLAN OF CARE
Problem: Adult Inpatient Plan of Care  Goal: Plan of Care Review  Outcome: Ongoing (interventions implemented as appropriate)  Received pt sitting up in chair inspite education regarding LE elevation to promote decrease in edema; AAOx4; NAD noted; VS remained stable throughout shift; afib on monitor with multiple runs of Vtach this shift; reported to MD with stat labs ordered returned WNL; pt asymptomatic during events;  medicated for c/o pain x 2 per MAR; POC reviewed with pt; continue to be noncompliant with most of his care and instructions; urine sample obtained and sent to lab per orders; assessment per flowsheet; meds administered per MAR; safety precautions maintained; all care rendered per notes, orders, MAR    Pending: AM labs, daily wt

## 2019-07-06 NOTE — PLAN OF CARE
Problem: Adult Inpatient Plan of Care  Goal: Plan of Care Review  Outcome: Ongoing (interventions implemented as appropriate)  Lasix change to ivp tid with metolazone. C/o pain moderately relieved with iv dilaudid. Ax abd dobutamine d/elba. poc discussed with pt, verbalize understanding.

## 2019-07-06 NOTE — ASSESSMENT & PLAN NOTE
ADHF in the setting of dietary, fluid and medication noncompliance   - Profile B, NYHA III,      - NICM, negative PET stress    - Most recent TTE demonstrates: LVef 23%,    - Pro-    - Cardiology will continue to follow along, Thank you  - Poor UOP on Lasix bolus, RECOMMEND LASIX INFUSION, Lasix bolus (120mg) followed by Lasix gtt 10mg/hr  - Continue Toprol 25mg qday, Bidil 37.5-20 bid , ASA 81mg,   - Will consider Aldactone / ARB / ARNI once renal function stable and euvolemic   - Need to follow up with EP outpatient for ICD consideration   - Need to be evaluated by HTS outpatient if able to comply with medical management  - Fluid restriction at 1500 cc with strict I/Os and daily STANDING weights  - Pending echocardiogram  - Maintain on telemetry and daily EKGs   - Check Electrolytes, keep Mag >2 & K+ >4  - SCDs, TEDs, Nursing communication to elevated LE   - Ambulate as tolerated

## 2019-07-06 NOTE — NURSING
Alisha RN gave 1mg IV Versed as ordered per MD, pt aao x;s4, HR 88, bp /80, will proceed with MRI as tolerated per pt

## 2019-07-06 NOTE — PROGRESS NOTES
Patient refused echo tech 3 times today. She said she will try back tomorrow. Notified MD. Will continue to monitor.

## 2019-07-06 NOTE — ASSESSMENT & PLAN NOTE
- Recommend further evaluation of his RLE weakness, possible CVA. Extremity warmth and nontender doubt embolic source.   - Recommend follow up with Neurology   - PT / OT to assess R shoulder and RLE   - Consider further imaging

## 2019-07-06 NOTE — PROGRESS NOTES
Pt taken to MRI . Pt AAOx4. Pt stable. No complaints of pain or signs of distress. Left per wheelchair with transport.

## 2019-07-06 NOTE — PROGRESS NOTES
Patient complained of right shoulder pain on a scale of 1-10 he rated it a 15. He refused to take the PRN medication that was ordered. I notified MD lu, about patient complaint. Administered a one time dose of dilaudid 2mg per MD order. Will continue to monitor.

## 2019-07-06 NOTE — PROGRESS NOTES
[facility] Hospital Medicine  Progress Note    Patient Name: Hamlet Terrell  MRN: [id number]  Patient Class: IP- Inpatient   Admission Date: 7/2/2019  Length of Stay: 1 days  Attending Physician: José Luis Langford MD  Primary Care Provider: Carlin Swift MD    [facility] Medicine Team: [facility] A José Luis Langford MD    Subjective:     Principal Problem:Acute on chronic combined systolic and diastolic heart failure    HPI:   54 y/o M with PMH combined CHF (last Echo 4/2019 with EF 23%, diastolic dysfuction, moderated MR and TR), HTN, chronic A. fib (on coumadin), A. flutter s/p ablation, CVA (in 2009), CAD, ?PE (patient reported), non-compliance, and drug seeking behavior presents c/o of right shoulder/arm pain following an injury from right leg weakness. Patient reports waking up to go to the restroom during the night approximately 4 days ago when he was unable to bare weight on right leg due to weakness. He attempted to get back in the bed and aggravated/injured his right shoulder climbing back into bed. He was hoping the pain would resolve on its own but it has persisted for past 4 days prompting him to come to ED. He reports he can move his right upper extremity but refuses to because he does not want to illicit the pain.  Pt denies fever, chills, appetite change, wt change, CP, SOB (at baseline, 2 pillow orthopnea), palpitations, increased leg swelling (chronic leg swelling and venous stasis changes), N/V/D, confusion, slurred speech, dysphagia, seizures, tremors, syncope.     Through chart review, patient seen in ED for various pain complaints in the past.      On 4/6/17 per Dr. Garza's note: chronic LE heel/ankle pain that initially was thought secondary to LE swelling, and possibly gout but as swelling decreased and no improvement with colchicine.  Pt initially receiving opiates, but this was titrated down and patient was started on Gabapentin, at 400mg BID.  MRI revealed no  structural abnormality, there was concern for possible left heel bursitis.  However pain appears out of proportion to exam and findings. Would not recommend any chronic opiate treatment for this pain.      Per discharge note on 4/24/19, patient did ask for a pain medication prescription on discharge, which he does not have an indication for.       On last hospital admission (5/14/19 - 5/17/19): Mr. Terrell's hospitalization was complicated by aggressive behavior and agitation, including threatening remarks made against provider. Patient complained of chest pain, workup for ACS was negative, and the pain improved with GI medications, which is consistent with the etiology of either hearburn/GERD or esophagitis. He was recommended to utilize a PPI and either Maalox or carafate, but on discussing this issue, he continued to escalate his behavior and consistently request narcotic pain medication. Patient threatening to return to ED if discharged. Security was present during discharge counseling session due to the threats.      In 2018, patient prescribed Bethel by 4 different prescribers at various times. Last opioid prescription written in May 2019 by an ER physician.     In the ED: Afebrile without leukocytosis on arrival. Hypertensive at 140s-150s/80s. Hgb 8.7 (baseline). ESR elevated >120, CRP 25.9. INR non-therapeutic (1.2). EKG with A. Fib. Alk Phos 236, bili 1.8. Tox screen positive for benzos and opiates. UA unremarkable. CT head without acute intracranial abnormalities. R shoulder XR with no acute fracture. MRI brain with chronic infarcts. Cervical, lumbar, and thoracic spine MRI limited due to artifact but showed chronic changes and C5-6 level demonstrates appearance of disc osteophyte disease mild anterior impression upon the dural sac without high-grade stenosis.  Foraminal evaluation is limited, there is suggestion of foraminal encroachment on the right.  Correlation for exiting nerve root symptomatology is  needed.     Hospital Course:   Patient completed MRI Brain and MRI entire spine that did not reveal acute finding to explain patient's unilateral symptoms.  Pt does have some abnormalities on spinal MRI as per report and HPI.     Interval History:   MRI not done, patient declined to go   See significant event note for details  Patient refusing multiple diagnostic tests, refusing exam on consultant visits, refusing IV access.     After discussion this afternoon pt reports he will accept/comply with ordered treatments therapies  Will stop Antibiotics  Stop Dobutamine     Pt has only 1 IV  Metolazone 5mg PO and Lasix 120mg every 8hrs as with limited IV access will forego Lasix drip     Ongoing shoulder pain - reports ongoing inability to move RLE , Unwitnessed but patient did move from Chair to bathroom on his own, reports leaning against wall and mild weight bearing to RLE and manually swinging leg to assist.        Review of Systems   Constitutional: Negative for chills, diaphoresis and fever.   HENT: Negative for sore throat.    Eyes: Negative for visual disturbance.   Respiratory: Negative for shortness of breath.    Cardiovascular: Positive for leg swelling. Negative for chest pain and palpitations.   Gastrointestinal: Negative for abdominal pain, constipation and diarrhea.   Skin: Negative for color change.     Objective:     Vital Signs (Most Recent):  Temp: 97.6 °F (36.4 °C) (07/05/19 2020)  Pulse: 75 (07/05/19 2020)  Resp: 16 (07/05/19 2020)  BP: 100/74 (07/05/19 2020)  SpO2: 99 % (07/05/19 2020) Vital Signs (24h Range):  Temp:  [97.5 °F (36.4 °C)-98.3 °F (36.8 °C)] 97.6 °F (36.4 °C)  Pulse:  [] 75  Resp:  [16-18] 16  SpO2:  [96 %-100 %] 99 %  BP: (100-136)/(60-74) 100/74     Weight: 118.4 kg (261 lb)  Body mass index is 38.54 kg/m².    Intake/Output Summary (Last 24 hours) at 7/5/2019 2211  Last data filed at 7/5/2019 1940  Gross per 24 hour   Intake 0 ml   Output 250 ml   Net -250 ml      Physical Exam    HENT:   Mouth/Throat: No oropharyngeal exudate.   Eyes: Pupils are equal, round, and reactive to light. No scleral icterus.   Neck:   Difficult to note JVP, due to beard/large neck, also upright position   Cardiovascular:   Irregular rhythm,  4+ Pitting edema to knee and dependent portion of thighs   Pulmonary/Chest: Effort normal and breath sounds normal. No stridor. No respiratory distress. He has no wheezes.   Abdominal: Soft. Bowel sounds are normal. He exhibits no distension. There is no tenderness.   Musculoskeletal: He exhibits edema.   RUE - tricep ext 3/5,  Bicep flex 3/5,   5/5, wrist flex 4/5, wrist ext 4/5, ulnar/radial deviation 5/5.   Shoulder flexion - 0/5, unable to     RLE hip flexion 0/5   Lymphadenopathy:     He has no cervical adenopathy.   Neurological:   Reflex Scores:       Bicep reflexes are 0 on the right side and 2+ on the left side.       Brachioradialis reflexes are 0 on the right side and 2+ on the left side.  Skin: Skin is warm and dry.       Significant Labs:   CMP:   Recent Labs   Lab 07/04/19  0429 07/04/19  1610 07/05/19  0856 07/05/19  2055    136 136 136   K 4.5 3.6 3.8 3.9    97 99 99   CO2 25 27 25 23   GLU 87 97 143* 139*   BUN 21* 28* 33* 38*   CREATININE 1.7* 1.9* 1.9* 2.0*   CALCIUM 9.3 9.4 9.2 9.4   PROT 8.1  --  7.8 8.6*   ALBUMIN 3.1*  --  3.0* 3.2*   BILITOT 2.3*  --  1.9* 1.7*   ALKPHOS 187*  --  169* 177*   AST 15  --  16 19   ALT 5*  --  7* 7*   ANIONGAP 11 12 12 14   EGFRNONAA 45.4* 39.7* 39.7* 37.3*     Cardiac Markers:   No results for input(s): CKMB, MYOGLOBIN, BNP, TROPISTAT in the last 48 hours.  Lactic Acid:   Recent Labs   Lab 07/04/19  0553   LACTATE 1.1     Results for DIANN BARRERA ZULMA (MRN [id number]) as of 7/4/2019 11:09   Ref. Range 7/4/2019 04:29   Uric Acid Latest Ref Range: 3.4 - 7.0 mg/dL 12.1 (H)     Results for HOLLYDIANN (MRN [id number]) as of 7/4/2019 11:09   Ref. Range 7/2/2019 22:15   Sed Rate Latest Ref Range: 0 - 23 mm/Hr  >120 (H)       Significant Imaging: I have reviewed all pertinent imaging results/findings within the past 24 hours.    Assessment/Plan:      Acute on Chronic Combined systolic/diastolic heart failure   -120mg IV lasix every 8 hours, Metolazone 5mg today and tomorrow AM  -Stop Dobutamine.   -Repeat ECHO ordered pending- pt refused 3 times today         Right sided weakness & acute pain   -etiology remains unclear, s/p MRI Brain/C/T/L spine  -Patient has chronic elevation of Uric Acid, prior synovial fluid studies negative for gout/pseudogout crystals   -s/p Right shoulder arthrocentesis - gram stain negative, culture pending   -Patient refused exam for neurology consult   -Lack of participation in medical care/testing is limiting clinical evaluations.     Febrile ILlness  -procal elevated, blood cultures drawn  -initial UA w/o signs of infection  -CXR w/o signs of new infiltrate  -Stopping Vanc & Zosyn   -f/u synovial fluid culture    Atrial Fibrillation   -rate controlled on toprol   telemetry    JEAN-PIERRE on CKD   -see heart failure  -Urine studies ordered and pending  -bladder scan, if retaining will need monroe catheter  -may also need monroe   -Suspect cardiorenal syndrome. Delayed and inadequate diuresis to this point. Urine lytes both FeNa 0.4% and FeUrea 14% represent Pre-renal etiology in this case.     Hx of VTE  -Coumadin @6mg   -subtherapeutic  -no plans for surgery - start Therapeutic Lovenox given high CHADS-VASC score    Anemia of iron deficiency  -normal ferritin but low serum iron and saturation on April studies  -senttype & screen     Active Diagnoses:    Diagnosis Date Noted POA    PRINCIPAL PROBLEM:  Acute on chronic combined systolic and diastolic heart failure [I50.43] 11/18/2013 Yes    Febrile illness, acute [R50.9] 07/04/2019 No    Acute pain of right shoulder due to trauma [M25.511, G89.11] 07/03/2019 Yes    Atrial fibrillation, chronic [I48.2] 01/02/2013 Yes     Chronic    Weakness of lower  extremity [R29.898] 07/03/2019 Yes    JEAN-PIERRE (acute kidney injury) [N17.9] 06/03/2016 Yes    Shoulder pain, right [M25.511] 07/04/2019 Yes    Essential hypertension [I10] 01/02/2013 Yes     Chronic    Personal history of venous thrombosis and embolism [Z86.718] 09/22/2013 Not Applicable    Chronic anticoagulation [Z79.01] 01/01/2013 Not Applicable     Chronic    Chronic kidney disease [N18.9] 05/31/2016 Yes     Chronic    Lumbar back pain [M54.5] 07/06/2015 Yes    Venous stasis of lower extremity [I87.8] 05/15/2019 Yes    Neuropathy of both feet [G57.93] 07/03/2019 Yes    Prediabetes [R73.03] 07/03/2019 Yes    Elevated alkaline phosphatase level [R74.8] 12/10/2016 Yes    Non compliance w medication regimen [Z91.14] 01/01/2013 Not Applicable     Chronic    History of CVA (cerebrovascular accident) [Z86.73] 01/01/2013 Not Applicable      Problems Resolved During this Admission:     VTE Risk Mitigation (From admission, onward)        Ordered     enoxaparin injection 120 mg  Every 12 hours (non-standard times)      07/05/19 2210     warfarin (COUMADIN) tablet 6 mg  Daily      07/05/19 1807     IP VTE HIGH RISK PATIENT  Once      07/03/19 0955     Place sequential compression device  Until discontinued      07/03/19 0955     Place CAMI hose  Until discontinued      07/03/19 0841             José Luis Langford MD  Department of Hospital Medicine   [facility]

## 2019-07-06 NOTE — ASSESSMENT & PLAN NOTE
Permanent Afib:    - CHADS-VASC 3  - History of CVA 2009 and presumed TIA on this admission, PATIENT NEEDS BRIDGING.   - Okay with Lovenox 1mg/kg BID with Coumadin, if patient does not want subq injection please switch to Heparin   - Continue home Toprol 25mg qday  - Goal INR 3 - 2   - Maintain K > 4, Mag > 2 and Ca/iCal WNL to decrease arrhythmogenic potential  - Maintain on telemetry and daily morning EKGs for the next few days

## 2019-07-07 LAB
ALBUMIN SERPL BCP-MCNC: 3.3 G/DL (ref 3.5–5.2)
ALP SERPL-CCNC: 189 U/L (ref 55–135)
ALT SERPL W/O P-5'-P-CCNC: 9 U/L (ref 10–44)
ANION GAP SERPL CALC-SCNC: 14 MMOL/L (ref 8–16)
AST SERPL-CCNC: 21 U/L (ref 10–40)
BASOPHILS # BLD AUTO: 0.03 K/UL (ref 0–0.2)
BASOPHILS NFR BLD: 0.4 % (ref 0–1.9)
BILIRUB SERPL-MCNC: 1.2 MG/DL (ref 0.1–1)
BUN SERPL-MCNC: 49 MG/DL (ref 6–20)
CALCIUM SERPL-MCNC: 9.9 MG/DL (ref 8.7–10.5)
CHLORIDE SERPL-SCNC: 93 MMOL/L (ref 95–110)
CO2 SERPL-SCNC: 29 MMOL/L (ref 23–29)
CREAT SERPL-MCNC: 1.9 MG/DL (ref 0.5–1.4)
DIFFERENTIAL METHOD: ABNORMAL
EOSINOPHIL # BLD AUTO: 0.4 K/UL (ref 0–0.5)
EOSINOPHIL NFR BLD: 4.9 % (ref 0–8)
ERYTHROCYTE [DISTWIDTH] IN BLOOD BY AUTOMATED COUNT: 16.2 % (ref 11.5–14.5)
EST. GFR  (AFRICAN AMERICAN): 45.8 ML/MIN/1.73 M^2
EST. GFR  (NON AFRICAN AMERICAN): 39.7 ML/MIN/1.73 M^2
GLUCOSE SERPL-MCNC: 91 MG/DL (ref 70–110)
HCT VFR BLD AUTO: 23.9 % (ref 40–54)
HGB BLD-MCNC: 7.1 G/DL (ref 14–18)
IMM GRANULOCYTES # BLD AUTO: 0.03 K/UL (ref 0–0.04)
IMM GRANULOCYTES NFR BLD AUTO: 0.4 % (ref 0–0.5)
INR PPP: 1.1 (ref 0.8–1.2)
INR PPP: 1.2 (ref 0.8–1.2)
LYMPHOCYTES # BLD AUTO: 0.8 K/UL (ref 1–4.8)
LYMPHOCYTES NFR BLD: 11 % (ref 18–48)
MAGNESIUM SERPL-MCNC: 2 MG/DL (ref 1.6–2.6)
MCH RBC QN AUTO: 23.7 PG (ref 27–31)
MCHC RBC AUTO-ENTMCNC: 29.7 G/DL (ref 32–36)
MCV RBC AUTO: 80 FL (ref 82–98)
MONOCYTES # BLD AUTO: 1.2 K/UL (ref 0.3–1)
MONOCYTES NFR BLD: 17.1 % (ref 4–15)
NEUTROPHILS # BLD AUTO: 4.8 K/UL (ref 1.8–7.7)
NEUTROPHILS NFR BLD: 66.2 % (ref 38–73)
NRBC BLD-RTO: 0 /100 WBC
PLATELET # BLD AUTO: 311 K/UL (ref 150–350)
PMV BLD AUTO: 9.7 FL (ref 9.2–12.9)
POTASSIUM SERPL-SCNC: 3.6 MMOL/L (ref 3.5–5.1)
PROT SERPL-MCNC: 8.9 G/DL (ref 6–8.4)
PROTHROMBIN TIME: 11.7 SEC (ref 9–12.5)
PROTHROMBIN TIME: 11.7 SEC (ref 9–12.5)
RBC # BLD AUTO: 2.99 M/UL (ref 4.6–6.2)
SODIUM SERPL-SCNC: 136 MMOL/L (ref 136–145)
WBC # BLD AUTO: 7.2 K/UL (ref 3.9–12.7)

## 2019-07-07 PROCEDURE — 99232 SBSQ HOSP IP/OBS MODERATE 35: CPT | Mod: ,,, | Performed by: INTERNAL MEDICINE

## 2019-07-07 PROCEDURE — 36415 COLL VENOUS BLD VENIPUNCTURE: CPT

## 2019-07-07 PROCEDURE — 80053 COMPREHEN METABOLIC PANEL: CPT

## 2019-07-07 PROCEDURE — 85025 COMPLETE CBC W/AUTO DIFF WBC: CPT

## 2019-07-07 PROCEDURE — 94761 N-INVAS EAR/PLS OXIMETRY MLT: CPT

## 2019-07-07 PROCEDURE — 93005 ELECTROCARDIOGRAM TRACING: CPT

## 2019-07-07 PROCEDURE — 85610 PROTHROMBIN TIME: CPT | Mod: 91

## 2019-07-07 PROCEDURE — 97165 OT EVAL LOW COMPLEX 30 MIN: CPT

## 2019-07-07 PROCEDURE — 25000003 PHARM REV CODE 250: Performed by: HOSPITALIST

## 2019-07-07 PROCEDURE — 25000003 PHARM REV CODE 250: Performed by: STUDENT IN AN ORGANIZED HEALTH CARE EDUCATION/TRAINING PROGRAM

## 2019-07-07 PROCEDURE — 83735 ASSAY OF MAGNESIUM: CPT

## 2019-07-07 PROCEDURE — 20600001 HC STEP DOWN PRIVATE ROOM

## 2019-07-07 PROCEDURE — 25000003 PHARM REV CODE 250: Performed by: PHYSICIAN ASSISTANT

## 2019-07-07 PROCEDURE — 85610 PROTHROMBIN TIME: CPT

## 2019-07-07 PROCEDURE — 99232 SBSQ HOSP IP/OBS MODERATE 35: CPT | Mod: ,,, | Performed by: HOSPITALIST

## 2019-07-07 PROCEDURE — 93010 ELECTROCARDIOGRAM REPORT: CPT | Mod: ,,, | Performed by: INTERNAL MEDICINE

## 2019-07-07 PROCEDURE — 93010 EKG 12-LEAD: ICD-10-PCS | Mod: ,,, | Performed by: INTERNAL MEDICINE

## 2019-07-07 PROCEDURE — 99232 PR SUBSEQUENT HOSPITAL CARE,LEVL II: ICD-10-PCS | Mod: ,,, | Performed by: INTERNAL MEDICINE

## 2019-07-07 PROCEDURE — 99232 PR SUBSEQUENT HOSPITAL CARE,LEVL II: ICD-10-PCS | Mod: ,,, | Performed by: HOSPITALIST

## 2019-07-07 PROCEDURE — 63600175 PHARM REV CODE 636 W HCPCS: Performed by: HOSPITALIST

## 2019-07-07 RX ORDER — MORPHINE SULFATE 15 MG/1
15 TABLET ORAL EVERY 4 HOURS PRN
Status: DISCONTINUED | OUTPATIENT
Start: 2019-07-07 | End: 2019-07-09

## 2019-07-07 RX ORDER — POTASSIUM CHLORIDE 20 MEQ/1
40 TABLET, EXTENDED RELEASE ORAL ONCE
Status: COMPLETED | OUTPATIENT
Start: 2019-07-07 | End: 2019-07-07

## 2019-07-07 RX ORDER — OXYCODONE HYDROCHLORIDE 10 MG/1
10 TABLET ORAL EVERY 4 HOURS PRN
Status: DISCONTINUED | OUTPATIENT
Start: 2019-07-07 | End: 2019-07-07

## 2019-07-07 RX ADMIN — MENTHOL, METHYL SALICYLATE: 10; 15 CREAM TOPICAL at 03:07

## 2019-07-07 RX ADMIN — ASPIRIN 81 MG: 81 TABLET, COATED ORAL at 09:07

## 2019-07-07 RX ADMIN — HYDROMORPHONE HYDROCHLORIDE 1 MG: 1 INJECTION, SOLUTION INTRAMUSCULAR; INTRAVENOUS; SUBCUTANEOUS at 05:07

## 2019-07-07 RX ADMIN — MENTHOL, METHYL SALICYLATE: 10; 15 CREAM TOPICAL at 09:07

## 2019-07-07 RX ADMIN — HYDRALAZINE HYDROCHLORIDE AND ISOSORBIDE DINITRATE 1 TABLET: 37.5; 2 TABLET, FILM COATED ORAL at 09:07

## 2019-07-07 RX ADMIN — POTASSIUM CHLORIDE 40 MEQ: 1500 TABLET, EXTENDED RELEASE ORAL at 09:07

## 2019-07-07 RX ADMIN — PANTOPRAZOLE SODIUM 40 MG: 40 TABLET, DELAYED RELEASE ORAL at 09:07

## 2019-07-07 RX ADMIN — FUROSEMIDE 10 MG/HR: 10 INJECTION, SOLUTION INTRAMUSCULAR; INTRAVENOUS at 09:07

## 2019-07-07 RX ADMIN — SENNOSIDES 8.6 MG: 8.6 TABLET, FILM COATED ORAL at 09:07

## 2019-07-07 RX ADMIN — GABAPENTIN 300 MG: 250 SOLUTION ORAL at 09:07

## 2019-07-07 RX ADMIN — FUROSEMIDE 10 MG/HR: 10 INJECTION, SOLUTION INTRAMUSCULAR; INTRAVENOUS at 03:07

## 2019-07-07 RX ADMIN — POLYETHYLENE GLYCOL 3350 17 G: 17 POWDER, FOR SOLUTION ORAL at 09:07

## 2019-07-07 RX ADMIN — ENOXAPARIN SODIUM 120 MG: 150 INJECTION SUBCUTANEOUS at 11:07

## 2019-07-07 RX ADMIN — METOPROLOL SUCCINATE 25 MG: 25 TABLET, EXTENDED RELEASE ORAL at 09:07

## 2019-07-07 RX ADMIN — WARFARIN SODIUM 10 MG: 10 TABLET ORAL at 04:07

## 2019-07-07 RX ADMIN — LIDOCAINE 3 PATCH: 50 PATCH TOPICAL at 10:07

## 2019-07-07 RX ADMIN — MORPHINE SULFATE 15 MG: 15 TABLET ORAL at 11:07

## 2019-07-07 NOTE — SUBJECTIVE & OBJECTIVE
"Principal Problem:Acute on chronic combined systolic and diastolic heart failure    Principal Orthopedic Problem: right shoulder pain    Interval History: pain in the shoulder is controlled. Cultures have not grown anything as of yet. Afebrile.     Review of patient's allergies indicates:   Allergen Reactions    Acetaminophen      Itching    Oxycodone-acetaminophen      Other reaction(s): Itching    Ace inhibitors Other (See Comments)     cough       Current Facility-Administered Medications   Medication    albuterol inhaler 1 puff    aspirin EC tablet 81 mg    calcium carbonate 200 mg calcium (500 mg) chewable tablet 500 mg    diphenhydrAMINE capsule 25 mg    enoxaparin injection 120 mg    furosemide (LASIX) 2 mg/mL in sodium chloride 0.9% 100 mL infusion (conc: 2 mg/mL)    gabapentin 250 mg/5 mL (5 mL) solution 300 mg    HYDROmorphone injection 0.5 mg    HYDROmorphone injection 1 mg    isosorbide-hydrALAZINE 20-37.5 mg per tablet 1 tablet    lidocaine 5 % patch 3 patch    methyl salicylate-menthol 15-10% cream    metoprolol succinate (TOPROL-XL) 24 hr tablet 25 mg    midazolam (VERSED) 1 mg/mL injection 1 mg    morphine injection 4 mg    nitroGLYCERIN SL tablet 0.4 mg    ondansetron injection 4 mg    pantoprazole EC tablet 40 mg    polyethylene glycol packet 17 g    ramelteon tablet 8 mg    senna tablet 8.6 mg    sodium chloride 0.9% flush 10 mL    sodium chloride 0.9% flush 10 mL    warfarin (COUMADIN) tablet 10 mg     Objective:     Vital Signs (Most Recent):  Temp: 98.6 °F (37 °C) (07/07/19 0859)  Pulse: (!) 55 (07/07/19 0859)  Resp: 16 (07/07/19 0859)  BP: 127/66 (07/07/19 0859)  SpO2: (!) 91 % (07/07/19 0859) Vital Signs (24h Range):  Temp:  [97.8 °F (36.6 °C)-98.9 °F (37.2 °C)] 98.6 °F (37 °C)  Pulse:  [] 55  Resp:  [16-20] 16  SpO2:  [90 %-99 %] 91 %  BP: ()/(54-87) 127/66     Weight: (patient refuse standing weight nurse was told)  Height: 5' 9" (175.3 cm)  Body mass " index is 38.54 kg/m².      Intake/Output Summary (Last 24 hours) at 7/7/2019 0910  Last data filed at 7/7/2019 0549  Gross per 24 hour   Intake 660 ml   Output 2775 ml   Net -2115 ml       Ortho/SPM Exam   RUE  Skin intact, no deformity noted  No open wounds/abrasions/laceration  TTP over R neck, R sterno clavicular joint, R clavicle. R shoulder diffusely, and R trapezius  Pain in all of these locations with ROM of R shoulder  Able to actively abduct to 30 degrees before pain limited  Able to passively abduct and flex to 90 deg with pain  SILT M/U/R  Motor intact AIN/PIN/M/U/R   Cap refill < 2s  2+ RP    Significant Labs: All pertinent labs within the past 24 hours have been reviewed.    Significant Imaging: I have reviewed all pertinent imaging results/findings.   MRI without any evidence of joint effusion

## 2019-07-07 NOTE — ASSESSMENT & PLAN NOTE
Mr. Terrell is a 53 year old male with history of CHF, HTN, Afib on coumadin, CAD and prior CVA in 2009, for whom neurology is asked to evaluate RLE.    Pt gives conflicting histories.  To me, pt reports sudden onset loss of light touch sensation and strength in RLE upon awakening, that has not improved or worsened since onset.  Denies any pain, urinary/fecal incontinence, groin numbness, nor other focal deficits.  On exam, decreased reflexes b/l, likely related to edema and difficulty in eliciting the reflex.  The pattern of weakness and sensory deficits do not clearly following a pathologic pattern.  On exam, while attempting to elicit strength in RLE, garduno's sign was positive.  Imaging of neuroaxis was unrevealing.      RN reports high suspicion that pt has been ambulating to restroom by himself.    Plan  -Garduno's sign positive on exam, pattern and history highly-concerning for a non-neurologic/anatomic etiology  -PT/OT  -General neurology will sign off.  Please call for any questions/concerns.

## 2019-07-07 NOTE — PLAN OF CARE
Problem: Adult Inpatient Plan of Care  Goal: Plan of Care Review  Outcome: Ongoing (interventions implemented as appropriate)  Pt free of falls/trauma/injuries.  Denies c/o SOB, CP, or discomfort.  Generalized skin remains CDI; moderated generalized  edema noted.  Pt being diuresed with lasix 10mg/hr 5ml/hr; diuresing well.Pt continues to c/o pain to left shoulder and is receiving lidocaine patches as well a n/o of morphine 15 mg tablet q 4 hrs prn for pain to help with the pain.Neurology consulted and is following pt.  Pt tolerating plan of care.

## 2019-07-07 NOTE — ASSESSMENT & PLAN NOTE
ADHF in the setting of dietary, fluid and medication noncompliance   - Profile B, NYHA III,      - NICM, negative PET stress    - Most recent TTE demonstrates: LVef 23%,    - Pro-    - Continue Lasix gtt @ 10, great UOP (-ve 2L overnight)  - Continue Toprol 25mg qday, Bidil 37.5-20 bid , ASA 81mg,   - Will consider Aldactone / ARB / ARNI once renal function stable and euvolemic   - Need to follow up with EP outpatient for ICD consideration   - Need to be evaluated by HTS outpatient if able to comply with medical management  - Fluid restriction at 1500 cc with strict I/Os and daily STANDING weights  - Pending echocardiogram  - Maintain on telemetry and daily EKGs   - Check Electrolytes, keep Mag >2 & K+ >4  - SCDs, TEDs, Nursing communication to elevated LE   - Ambulate as tolerated

## 2019-07-07 NOTE — CARE UPDATE
Patient's hemoglobin has been in 7 range this admit.     Today is 7.1,  Type and Screen is curren. Pt is on warfarin and therapeutic anticoagulation w/o signs of abnormal bleeding    I discussed with patient at bedside blood product consent form    He stated he did not want any blood product transfusion, he stated decision stemmed from a nurse telling him in the past that there is a 50/50 chance of getting an infection from blood transfusion.  I corrected him and reviewed entire consent form.  Pt still would not want to receive prodcuts.     Reviewed Blood product refusal form and patient wants no derived products during this Admit    Form signed by patient and placed in Consent section of chart    Discussed if patient would be amenable to IV iron or Epo dose while in hospital as alternative therapy, pt reported he would have to think about it.   Repeat iron studies/ferritin/retic ct in AM.         José Luis Langford M.D.  Attending Physician  [facility] Medicine Dept.  Pager: [phone]  Spectralink -[phone]

## 2019-07-07 NOTE — ASSESSMENT & PLAN NOTE
Permanent Afib:   - CHADS-VASC 3  - History of CVA 2009 and presumed TIA on this admission, PATIENT NEEDS BRIDGING.   - Continue Lovenox 1mg/kg BID with Coumadin  - Continue home Toprol 25mg qday  - Goal INR 3 - 2   - Maintain K > 4, Mag > 2 and Ca/iCal WNL to decrease arrhythmogenic potential  - Maintain on telemetry and daily morning EKGs for the next few days

## 2019-07-07 NOTE — SUBJECTIVE & OBJECTIVE
Subjective:     Interval History:   Asked to revisit pt as he has been more cooperative lately.      Pt reports that prior to admission, he awoke in the middle of the night to urinate, and collapsed because his RLE gave out on him.  Denies any pain or trauma.  States he then urinated over his expensive bedsprings and mattress because he couldn't make it to the bathroom in time.  States he has been completely unable to move his leg since that time with no improvement.      With regards to his prior stroke, states that over a period of months-years he recovered with no residual deficits.      RN reports high suspicion that pt is able to ambulate to bathroom on his own.      Current Facility-Administered Medications   Medication Dose Route Frequency Provider Last Rate Last Dose    albuterol inhaler 1 puff  1 puff Inhalation Q6H PRN Dena Khan PA-C        aspirin EC tablet 81 mg  81 mg Oral Daily Dena Khan PA-C   81 mg at 07/07/19 0902    calcium carbonate 200 mg calcium (500 mg) chewable tablet 500 mg  500 mg Oral TID PRN Dena Khan PA-C        diphenhydrAMINE capsule 25 mg  25 mg Oral Q6H PRN Maria Eugenia Parra NP   25 mg at 07/03/19 2120    enoxaparin injection 120 mg  120 mg Subcutaneous Q12H José Luis Langford MD   120 mg at 07/07/19 1143    furosemide (LASIX) 2 mg/mL in sodium chloride 0.9% 100 mL infusion (conc: 2 mg/mL)  10 mg/hr Intravenous Continuous José Luis Langford MD 5 mL/hr at 07/07/19 0349 10 mg/hr at 07/07/19 0349    gabapentin 250 mg/5 mL (5 mL) solution 300 mg  300 mg Oral BID José Luis Langford MD   300 mg at 07/07/19 0902    isosorbide-hydrALAZINE 20-37.5 mg per tablet 1 tablet  1 tablet Oral BID Checo Oneill MD   1 tablet at 07/07/19 0904    lidocaine 5 % patch 3 patch  3 patch Transdermal Q24H Dena Khan PA-C   3 patch at 07/07/19 1011    methyl salicylate-menthol 15-10% cream   Topical (Top) TID José Luis Langford MD        metoprolol succinate (TOPROL-XL) 24  hr tablet 25 mg  25 mg Oral Daily Dena Khan PA-C   25 mg at 07/07/19 0902    morphine tablet 15 mg  15 mg Oral Q4H PRN José Luis Langford MD   15 mg at 07/07/19 1157    nitroGLYCERIN SL tablet 0.4 mg  0.4 mg Sublingual Q5 Min PRN Dena Khan PA-C        ondansetron injection 4 mg  4 mg Intravenous Q12H PRN Dena Khan PA-C        pantoprazole EC tablet 40 mg  40 mg Oral Daily Dena Khan PA-C   40 mg at 07/07/19 0902    polyethylene glycol packet 17 g  17 g Oral BID José Luis Langford MD   17 g at 07/07/19 0902    ramelteon tablet 8 mg  8 mg Oral Nightly PRN Dena Khan PA-C        senna tablet 8.6 mg  8.6 mg Oral Daily José Luis Langford MD   8.6 mg at 07/07/19 0902    sodium chloride 0.9% flush 10 mL  10 mL Intravenous PRN Cristofer Rosario MD        sodium chloride 0.9% flush 10 mL  10 mL Intravenous PRN Dena Khan PA-C        warfarin (COUMADIN) tablet 10 mg  10 mg Oral Daily José Luis Langford MD   10 mg at 07/06/19 1837       Review of Systems   Constitutional: Negative for fever.   Respiratory: Positive for shortness of breath.    Musculoskeletal: Positive for arthralgias.   Skin: Negative for rash.   Neurological: Positive for weakness and numbness.     Objective:     Vital Signs (Most Recent):  Temp: 98.9 °F (37.2 °C) (07/07/19 1143)  Pulse: 97 (07/07/19 1200)  Resp: 16 (07/07/19 1143)  BP: (!) 97/58 (07/07/19 1143)  SpO2: 98 % (07/07/19 1143) Vital Signs (24h Range):  Temp:  [97.8 °F (36.6 °C)-98.9 °F (37.2 °C)] 98.9 °F (37.2 °C)  Pulse:  [] 97  Resp:  [16-20] 16  SpO2:  [90 %-98 %] 98 %  BP: ()/(54-87) 97/58     Weight: (patient refuse standing weight nurse was told)  Body mass index is 38.54 kg/m².    Physical Exam   Constitutional: He appears well-developed. No distress.   Eyes: Pupils are equal, round, and reactive to light. EOM are normal.   Cardiovascular: Normal rate and regular rhythm.   Pulmonary/Chest: Effort normal. No respiratory distress.    Abdominal: Soft. Bowel sounds are normal. He exhibits no distension. There is no tenderness.   Musculoskeletal: He exhibits edema.   Neurological:   Reflex Scores:       Bicep reflexes are 2+ on the right side and 2+ on the left side.       Brachioradialis reflexes are 2+ on the right side and 2+ on the left side.      NEUROLOGICAL EXAMINATION:     MENTAL STATUS   Level of consciousness: alert       Oriented to month/year.     CRANIAL NERVES     CN II   Visual fields full to confrontation.     CN III, IV, VI   Pupils are equal, round, and reactive to light.  Extraocular motions are normal.     CN V   Facial sensation intact.     CN VII   Facial expression full, symmetric.     CN VIII   Hearing: intact    CN IX, X   Palate: symmetric    CN XII   CN XII normal.        No tongue lacerations visualized    CN XI - deferred 2/2 shoulder pain.     MOTOR EXAM   Muscle bulk: normal  Overall muscle tone: normal       5/5 LUE, LLE  5/5 R wrist dorsi/plantar flexion, finger abduction/flextion/extension (further RUE deferred 2/2 pain)  0/5 RLE    **Arceo's sign positive when checking RLE flexion/extension**     REFLEXES     Reflexes   Right brachioradialis: 2+  Left brachioradialis: 2+  Right biceps: 2+  Left biceps: 2+  Right Irby: absent  Left Irby: absent  Right ankle clonus: absent  Left ankle clonus: absent    SENSORY EXAM        Sensation to light touch Absent on RLE, but intact in all other extremities.     Sensation to vibration decreased on RLE compared to LLE.  Sensation to pinprick intact in all 4 extremities  Sensation to temperature decreased in a length-dependent fashion.       GAIT AND COORDINATION        Gait deferred 2/2 seizure precautions       Significant Labs:   Recent Results (from the past 24 hour(s))   CBC auto differential    Collection Time: 07/07/19  6:38 AM   Result Value Ref Range    WBC 7.20 3.90 - 12.70 K/uL    RBC 2.99 (L) 4.60 - 6.20 M/uL    Hemoglobin 7.1 (L) 14.0 - 18.0 g/dL     "Hematocrit 23.9 (L) 40.0 - 54.0 %    Mean Corpuscular Volume 80 (L) 82 - 98 fL    Mean Corpuscular Hemoglobin 23.7 (L) 27.0 - 31.0 pg    Mean Corpuscular Hemoglobin Conc 29.7 (L) 32.0 - 36.0 g/dL    RDW 16.2 (H) 11.5 - 14.5 %    Platelets 311 150 - 350 K/uL    MPV 9.7 9.2 - 12.9 fL    Immature Granulocytes 0.4 0.0 - 0.5 %    Gran # (ANC) 4.8 1.8 - 7.7 K/uL    Immature Grans (Abs) 0.03 0.00 - 0.04 K/uL    Lymph # 0.8 (L) 1.0 - 4.8 K/uL    Mono # 1.2 (H) 0.3 - 1.0 K/uL    Eos # 0.4 0.0 - 0.5 K/uL    Baso # 0.03 0.00 - 0.20 K/uL    nRBC 0 0 /100 WBC    Gran% 66.2 38.0 - 73.0 %    Lymph% 11.0 (L) 18.0 - 48.0 %    Mono% 17.1 (H) 4.0 - 15.0 %    Eosinophil% 4.9 0.0 - 8.0 %    Basophil% 0.4 0.0 - 1.9 %    Differential Method Automated    Magnesium    Collection Time: 19  6:38 AM   Result Value Ref Range    Magnesium 2.0 1.6 - 2.6 mg/dL   Comprehensive metabolic panel    Collection Time: 19  6:38 AM   Result Value Ref Range    Sodium 136 136 - 145 mmol/L    Potassium 3.6 3.5 - 5.1 mmol/L    Chloride 93 (L) 95 - 110 mmol/L    CO2 29 23 - 29 mmol/L    Glucose 91 70 - 110 mg/dL    BUN, Bld 49 (H) 6 - 20 mg/dL    Creatinine 1.9 (H) 0.5 - 1.4 mg/dL    Calcium 9.9 8.7 - 10.5 mg/dL    Total Protein 8.9 (H) 6.0 - 8.4 g/dL    Albumin 3.3 (L) 3.5 - 5.2 g/dL    Total Bilirubin 1.2 (H) 0.1 - 1.0 mg/dL    Alkaline Phosphatase 189 (H) 55 - 135 U/L    AST 21 10 - 40 U/L    ALT 9 (L) 10 - 44 U/L    Anion Gap 14 8 - 16 mmol/L    eGFR if African American 45.8 (A) >60 mL/min/1.73 m^2    eGFR if non  39.7 (A) >60 mL/min/1.73 m^2   Protime-INR    Collection Time: 19  6:38 AM   Result Value Ref Range    Prothrombin Time 11.7 9.0 - 12.5 sec    INR 1.2 0.8 - 1.2   Protime-INR    Collection Time: 19  6:38 AM   Result Value Ref Range    Prothrombin Time 11.7 9.0 - 12.5 sec    INR 1.1 0.8 - 1.2         Significant Imagin/6/19 MRI R Shoulder: Per report,  "No evidence for complete cuff tear though " "assessment for underlying partial tear is limited given incomplete study.    Questionable linear edema interposed between the posterior cuff and deltoid musculature.  Repeat assessment would be helpful to evaluate for muscular strain."  "

## 2019-07-07 NOTE — PLAN OF CARE
Problem: Adult Inpatient Plan of Care  Goal: Plan of Care Review  Outcome: Ongoing (interventions implemented as appropriate)  Lasix gtt maintained per MD orders. Complaints of right shoulder pain relieved with PRN medications. VSS. Pt denies SOB or chest pain. Pt remain on tele. Fall precautions maintained. Pt free of falls and injuries. POC discussed with patient/family, verbalized understanding. Questions answered, no distress at present.

## 2019-07-07 NOTE — PROGRESS NOTES
[facility]  Neurology  Progress Note    Patient Name: Hamlet Terrell  MRN: [id number]  Admission Date: 7/2/2019  Hospital Length of Stay: 3 days  Code Status: Full Code   Attending Provider: José Luis Langford MD  Primary Care Physician: Carlin Swift MD   Principal Problem:Acute on chronic combined systolic and diastolic heart failure      Subjective:     Interval History:   Asked to revisit pt as he has been more cooperative lately.      Pt reports that prior to admission, he awoke in the middle of the night to urinate, and collapsed because his RLE gave out on him.  Denies any pain or trauma.  States he then urinated over his expensive bedsprings and mattress because he couldn't make it to the bathroom in time.  States he has been completely unable to move his leg since that time with no improvement.      With regards to his prior stroke, states that over a period of months-years he recovered with no residual deficits.      RN reports high suspicion that pt is able to ambulate to bathroom on his own.      Current Facility-Administered Medications   Medication Dose Route Frequency Provider Last Rate Last Dose    albuterol inhaler 1 puff  1 puff Inhalation Q6H PRN Dena Khan PA-C        aspirin EC tablet 81 mg  81 mg Oral Daily Dena Khan PA-C   81 mg at 07/07/19 0902    calcium carbonate 200 mg calcium (500 mg) chewable tablet 500 mg  500 mg Oral TID PRN Dena Khan PA-C        diphenhydrAMINE capsule 25 mg  25 mg Oral Q6H PRN Maria Eugenia Parra NP   25 mg at 07/03/19 2120    enoxaparin injection 120 mg  120 mg Subcutaneous Q12H José Luis Langford MD   120 mg at 07/07/19 1143    furosemide (LASIX) 2 mg/mL in sodium chloride 0.9% 100 mL infusion (conc: 2 mg/mL)  10 mg/hr Intravenous Continuous José Luis Langford MD 5 mL/hr at 07/07/19 0349 10 mg/hr at 07/07/19 0349    gabapentin 250 mg/5 mL (5 mL) solution 300 mg  300 mg Oral BID José Luis Langford MD   300 mg at 07/07/19 0902     isosorbide-hydrALAZINE 20-37.5 mg per tablet 1 tablet  1 tablet Oral BID Checo Oneill MD   1 tablet at 07/07/19 0904    lidocaine 5 % patch 3 patch  3 patch Transdermal Q24H Dena Khan PA-C   3 patch at 07/07/19 1011    methyl salicylate-menthol 15-10% cream   Topical (Top) TID José Luis Langford MD        metoprolol succinate (TOPROL-XL) 24 hr tablet 25 mg  25 mg Oral Daily Dena Khan PA-C   25 mg at 07/07/19 0902    morphine tablet 15 mg  15 mg Oral Q4H PRN José Luis Langford MD   15 mg at 07/07/19 1157    nitroGLYCERIN SL tablet 0.4 mg  0.4 mg Sublingual Q5 Min PRN Dena Khan PA-C        ondansetron injection 4 mg  4 mg Intravenous Q12H PRN Dena Khan PA-C        pantoprazole EC tablet 40 mg  40 mg Oral Daily Dena Khan PA-C   40 mg at 07/07/19 0902    polyethylene glycol packet 17 g  17 g Oral BID José Luis Langford MD   17 g at 07/07/19 0902    ramelteon tablet 8 mg  8 mg Oral Nightly PRN Dena Khan PA-C        senna tablet 8.6 mg  8.6 mg Oral Daily José Luis Langford MD   8.6 mg at 07/07/19 0902    sodium chloride 0.9% flush 10 mL  10 mL Intravenous PRN Cristofer Rosario MD        sodium chloride 0.9% flush 10 mL  10 mL Intravenous PRN Dena Khan PA-C        warfarin (COUMADIN) tablet 10 mg  10 mg Oral Daily José Luis Langford MD   10 mg at 07/06/19 1837       Review of Systems   Constitutional: Negative for fever.   Respiratory: Positive for shortness of breath.    Musculoskeletal: Positive for arthralgias.   Skin: Negative for rash.   Neurological: Positive for weakness and numbness.     Objective:     Vital Signs (Most Recent):  Temp: 98.9 °F (37.2 °C) (07/07/19 1143)  Pulse: 97 (07/07/19 1200)  Resp: 16 (07/07/19 1143)  BP: (!) 97/58 (07/07/19 1143)  SpO2: 98 % (07/07/19 1143) Vital Signs (24h Range):  Temp:  [97.8 °F (36.6 °C)-98.9 °F (37.2 °C)] 98.9 °F (37.2 °C)  Pulse:  [] 97  Resp:  [16-20] 16  SpO2:  [90 %-98 %] 98 %  BP: ()/(54-87) 97/58      Weight: (patient refuse standing weight nurse was told)  Body mass index is 38.54 kg/m².    Physical Exam   Constitutional: He appears well-developed. No distress.   Eyes: Pupils are equal, round, and reactive to light. EOM are normal.   Cardiovascular: Normal rate and regular rhythm.   Pulmonary/Chest: Effort normal. No respiratory distress.   Abdominal: Soft. Bowel sounds are normal. He exhibits no distension. There is no tenderness.   Musculoskeletal: He exhibits edema.   Neurological:   Reflex Scores:       Bicep reflexes are 2+ on the right side and 2+ on the left side.       Brachioradialis reflexes are 2+ on the right side and 2+ on the left side.      NEUROLOGICAL EXAMINATION:     MENTAL STATUS   Level of consciousness: alert       Oriented to month/year.     CRANIAL NERVES     CN II   Visual fields full to confrontation.     CN III, IV, VI   Pupils are equal, round, and reactive to light.  Extraocular motions are normal.     CN V   Facial sensation intact.     CN VII   Facial expression full, symmetric.     CN VIII   Hearing: intact    CN IX, X   Palate: symmetric    CN XII   CN XII normal.        No tongue lacerations visualized    CN XI - deferred 2/2 shoulder pain.     MOTOR EXAM   Muscle bulk: normal  Overall muscle tone: normal       5/5 LUE, LLE  5/5 R wrist dorsi/plantar flexion, finger abduction/flextion/extension (further RUE deferred 2/2 pain)  0/5 RLE    **Arceo's sign positive when checking RLE flexion/extension**     REFLEXES     Reflexes   Right brachioradialis: 2+  Left brachioradialis: 2+  Right biceps: 2+  Left biceps: 2+  Right Irby: absent  Left Irby: absent  Right ankle clonus: absent  Left ankle clonus: absent    SENSORY EXAM        Sensation to light touch Absent on RLE, but intact in all other extremities.     Sensation to vibration decreased on RLE compared to LLE.  Sensation to pinprick intact in all 4 extremities  Sensation to temperature decreased in a length-dependent  fashion.       GAIT AND COORDINATION        Gait deferred 2/2 seizure precautions       Significant Labs:   Recent Results (from the past 24 hour(s))   CBC auto differential    Collection Time: 07/07/19  6:38 AM   Result Value Ref Range    WBC 7.20 3.90 - 12.70 K/uL    RBC 2.99 (L) 4.60 - 6.20 M/uL    Hemoglobin 7.1 (L) 14.0 - 18.0 g/dL    Hematocrit 23.9 (L) 40.0 - 54.0 %    Mean Corpuscular Volume 80 (L) 82 - 98 fL    Mean Corpuscular Hemoglobin 23.7 (L) 27.0 - 31.0 pg    Mean Corpuscular Hemoglobin Conc 29.7 (L) 32.0 - 36.0 g/dL    RDW 16.2 (H) 11.5 - 14.5 %    Platelets 311 150 - 350 K/uL    MPV 9.7 9.2 - 12.9 fL    Immature Granulocytes 0.4 0.0 - 0.5 %    Gran # (ANC) 4.8 1.8 - 7.7 K/uL    Immature Grans (Abs) 0.03 0.00 - 0.04 K/uL    Lymph # 0.8 (L) 1.0 - 4.8 K/uL    Mono # 1.2 (H) 0.3 - 1.0 K/uL    Eos # 0.4 0.0 - 0.5 K/uL    Baso # 0.03 0.00 - 0.20 K/uL    nRBC 0 0 /100 WBC    Gran% 66.2 38.0 - 73.0 %    Lymph% 11.0 (L) 18.0 - 48.0 %    Mono% 17.1 (H) 4.0 - 15.0 %    Eosinophil% 4.9 0.0 - 8.0 %    Basophil% 0.4 0.0 - 1.9 %    Differential Method Automated    Magnesium    Collection Time: 07/07/19  6:38 AM   Result Value Ref Range    Magnesium 2.0 1.6 - 2.6 mg/dL   Comprehensive metabolic panel    Collection Time: 07/07/19  6:38 AM   Result Value Ref Range    Sodium 136 136 - 145 mmol/L    Potassium 3.6 3.5 - 5.1 mmol/L    Chloride 93 (L) 95 - 110 mmol/L    CO2 29 23 - 29 mmol/L    Glucose 91 70 - 110 mg/dL    BUN, Bld 49 (H) 6 - 20 mg/dL    Creatinine 1.9 (H) 0.5 - 1.4 mg/dL    Calcium 9.9 8.7 - 10.5 mg/dL    Total Protein 8.9 (H) 6.0 - 8.4 g/dL    Albumin 3.3 (L) 3.5 - 5.2 g/dL    Total Bilirubin 1.2 (H) 0.1 - 1.0 mg/dL    Alkaline Phosphatase 189 (H) 55 - 135 U/L    AST 21 10 - 40 U/L    ALT 9 (L) 10 - 44 U/L    Anion Gap 14 8 - 16 mmol/L    eGFR if African American 45.8 (A) >60 mL/min/1.73 m^2    eGFR if non  39.7 (A) >60 mL/min/1.73 m^2   Protime-INR    Collection Time: 07/07/19  6:38 AM  "  Result Value Ref Range    Prothrombin Time 11.7 9.0 - 12.5 sec    INR 1.2 0.8 - 1.2   Protime-INR    Collection Time: 19  6:38 AM   Result Value Ref Range    Prothrombin Time 11.7 9.0 - 12.5 sec    INR 1.1 0.8 - 1.2         Significant Imagin/6/19 MRI R Shoulder: Per report,  "No evidence for complete cuff tear though assessment for underlying partial tear is limited given incomplete study.    Questionable linear edema interposed between the posterior cuff and deltoid musculature.  Repeat assessment would be helpful to evaluate for muscular strain."    Assessment and Plan:     Weakness of lower extremity  Mr. Terrell is a 53 year old male with history of CHF, HTN, Afib on coumadin, CAD and prior CVA in , for whom neurology is asked to evaluate RLE.    Pt gives conflicting histories.  To me, pt reports sudden onset loss of light touch sensation and strength in RLE upon awakening, that has not improved or worsened since onset.  Denies any pain, urinary/fecal incontinence, groin numbness, nor other focal deficits.  On exam, decreased reflexes b/l, likely related to edema and difficulty in eliciting the reflex.  The pattern of weakness and sensory deficits do not clearly following a pathologic pattern.  On exam, while attempting to elicit strength in RLE, garduno's sign was positive.  Imaging of neuroaxis was unrevealing.      RN reports high suspicion that pt has been ambulating to restroom by himself.    Plan  -Garduno's sign positive on exam, pattern and history highly-concerning for a non-neurologic/anatomic etiology  -PT/OT  -General neurology will sign off.  Please call for any questions/concerns.         Chronic kidney disease  -Management per primary      Essential hypertension  -Management per primary      History of CVA (cerebrovascular accident)  Pt reports no residual deficits.    Non compliance w medication regimen  -Management per primary      Chronic anticoagulation  -Management per " primary          VTE Risk Mitigation (From admission, onward)        Ordered     warfarin (COUMADIN) tablet 10 mg  Daily      07/06/19 0949     enoxaparin injection 120 mg  Every 12 hours (non-standard times)      07/05/19 2210     IP VTE HIGH RISK PATIENT  Once      07/03/19 0955     Place sequential compression device  Until discontinued      07/03/19 0955     Place CAMI hose  Until discontinued      07/03/19 0841          Carlitos Lenz MD  Neurology  [facility]

## 2019-07-07 NOTE — PT/OT/SLP EVAL
"Occupational Therapy   Evaluation    Name: Hamlet Terrell  MRN: [id number]  Admitting Diagnosis:  Acute on chronic combined systolic and diastolic heart failure      Recommendations:     Discharge Recommendations: nursing facility, skilled  Discharge Equipment Recommendations:  other (see comments), walker, rolling, commode(TBD)  Barriers to discharge:  None    Assessment:     Hamlet Terrell is a 53 y.o. male with a medical diagnosis of Acute on chronic combined systolic and diastolic heart failure. Pt presents with decreased endurance and impaired mobility performance as limited by cardiovascular status and generalized weakness. Pt guarded and agitated during OT evaluation and unwilling to perform thorough mobility performance d/t 9/10 R shoulder pain. Pt refused to stand with pt explaining "You will fall on the ground. I need a man in here you're a woman who will get mad if I elbow you in the chest as I'm falling.And I promise you right now it will happen." OT discussed with pt additional help could assist however deemed necessary with pt still resistive. Pt explained to OT prior to admission, pt was living in [facility] with adult nieces and nephews with 3 ANAID home. Pt explains he had been (I) with ADLs and functional mobility and family driving him to appointments. Pt reported that as of this last Monday, pt has been experiencing R UE/LE weakness and has been unable to walk. He explains niece had found patient in bed at home, soiled, and then proceded to drag him across floor by R shoulder to get into friend's cab to admit self to hospital for onset of weakness. During OT eval, pt with noted ~30* shoulder flexion and numbness in R LE. POC discussed with pt with pt attitude more positive regarding therapy, later explaining he is wanting therapy as stating "I can't go home like this. I need to be able to walk to the [facility] before I can go home." Since being in hospital, pt reports "I have not moved from this recliner. " "I sleep in the recliner at home because I get SOB laying flat."Performance deficits affecting function: weakness, impaired endurance, impaired self care skills, gait instability, impaired functional mobilty, impaired sensation, impaired balance, decreased coordination, decreased lower extremity function, decreased upper extremity function, decreased ROM, impaired fine motor, impaired cardiopulmonary response to activity.  Pt would benefit from continued OT skilled services 3x/wk to improve daily living skills to optimize QOL. Pt is recommended to discharge to SNF at this time.      Rehab Prognosis: Fair and Poor; patient would benefit from acute skilled OT services to address these deficits and reach maximum level of function.       Plan:     Patient to be seen 3 x/week to address the above listed problems via self-care/home management, therapeutic activities, therapeutic exercises  · Plan of Care Expires: 08/07/19  · Plan of Care Reviewed with: patient    Subjective     Chief Complaint: "No offense but I am not walking with you. You'll drop me in a heartbeat." "If you would stop cutting me off for one second I could actually explain what is going on"  Patient/Family Comments/goals: Improve strength to walk [facility]    Occupational Profile:  Living Environment: Pt lives in [facility] with 3 ANAID home and resides with adult nieces and nephews. Pt has shower/tub combination for bathing.   Previous level of function: (I) with ADLs and functional mobility. Pt not working.  Roles and Routines: Pt enjoys going to [facility] and walking.  Equipment Used at Home:  oxygen  Assistance upon Discharge: None    Pain/Comfort:  · Pain Rating 1: 9/10  · Location - Side 1: Right  · Location - Orientation 1: generalized  · Location 1: shoulder  · Pain Addressed 1: Distraction, Nurse notified    Patients cultural, spiritual, Spiritism conflicts given the current situation: no    Objective:     Communicated with: RN prior to session.  Patient " found up in chair with peripheral IV upon OT entry to room.    General Precautions: Standard, fall   Orthopedic Precautions:N/A   Braces: N/A     Occupational Performance:    Bed Mobility:    · Pt refused mobility    Functional Mobility/Transfers:  · Pt refused mobility    Activities of Daily Living:  · Feeding:  setup breakfast tray  · Lower Body Dressing: total assistance donning socks     Cognitive/Visual Perceptual:  Cognitive/Psychosocial Skills:     -       Oriented to: Person, Place, Time and Situation   -       Follows Commands/attention:Follows multistep  commands  -       Communication: clear/fluent  -       Memory: No Deficits noted  -       Safety awareness/insight to disability: impaired   -       Mood/Affect/Coping skills/emotional control: Lashes out, Agitated, Guarded, Isolative and Withdrawn    Physical Exam:  Balance:    -       Demo good sitting balance in bedside chair  Dominant hand:    -       right  Upper Extremity Range of Motion:     -       Right Upper Extremity: Deficits: shoulder flexion 30*  -       Left Upper Extremity: WFL  Upper Extremity Strength:    -       Right Upper Extremity: Deficits: strength NT due to 9/10 pain in shoulder   -       Left Upper Extremity: WFL   Strength:    -       Right Upper Extremity: WFL  -       Left Upper Extremity: WFL    AMPAC 6 Click ADL:  AMPAC Total Score: 13    Treatment & Education:  Pt educated on role of occupational therapy, POC, and safety during ADLs and functional mobility. Pt and OT discussed importance of safe, continued mobility to optimize daily living skills. Pt verbalized understanding. White board updated during session. Pt given instruction to call for medical staff/nurse for assistance.     Education:    Patient left up in chair with all lines intact and call button in reach    GOALS:   Multidisciplinary Problems     Occupational Therapy Goals        Problem: Occupational Therapy Goal    Goal Priority Disciplines Outcome  Interventions   Occupational Therapy Goal     OT, PT/OT Ongoing (interventions implemented as appropriate)    Description:  Goals to be met by: 7/15/19     Patient will increase functional independence with ADLs by performing:    UE Dressing with Moderate Assistance.  LE Dressing with Maximum Assistance.  Grooming while seated with Minimal Assistance.  Toileting from bedside commode with Maximum Assistance for hygiene and clothing management.   Rolling to Bilateral with Maximum Assistance.   Supine to sit with Maximum Assistance.  Stand pivot transfers with Maximum Assistance.  Step transfer with Maximum Assistance  Toilet transfer to bedside commode with Maximum Assistance.                      History:     Past Medical History:   Diagnosis Date    Anticoagulant long-term use     Cardiomegaly     Chronic combined systolic and diastolic congestive heart failure     Coronary artery disease     Heart attack 2006    Hypertension     Hyperthyroidism, subclinical 1/2/2013    MI (myocardial infarction) 9/22/2013    MI in 2009      Paroxysmal atrial fibrillation     PE (pulmonary embolism) 1/1/2013    IN 2010     S/P ablation of atrial flutter 2008    Stroke 2009    no residual weaknesses       Past Surgical History:   Procedure Laterality Date    RADIOFREQUENCY ABLATION  01/08/2008    for atrial flutter       Time Tracking:     OT Date of Treatment: 07/07/19  OT Start Time: 0814  OT Stop Time: 0834  OT Total Time (min): 20 min    Billable Minutes:Evaluation 20 min    Anna English OT  7/7/2019

## 2019-07-07 NOTE — PLAN OF CARE
Problem: Occupational Therapy Goal  Goal: Occupational Therapy Goal  Goals to be met by: 7/15/19     Patient will increase functional independence with ADLs by performing:    UE Dressing with Moderate Assistance.  LE Dressing with Maximum Assistance.  Grooming while seated with Minimal Assistance.  Toileting from bedside commode with Maximum Assistance for hygiene and clothing management.   Rolling to Bilateral with Maximum Assistance.   Supine to sit with Maximum Assistance.  Stand pivot transfers with Maximum Assistance.  Step transfer with Maximum Assistance  Toilet transfer to bedside commode with Maximum Assistance.    Outcome: Ongoing (interventions implemented as appropriate)  Evaluated pt and established OT POC. Continue as tolerated.  Anna English  7/7/2019

## 2019-07-07 NOTE — PROGRESS NOTES
[facility] Hospital Medicine  Progress Note    Patient Name: Hamlet Terrell  MRN: [id number]  Patient Class: IP- Inpatient   Admission Date: 7/2/2019  Length of Stay: 3 days  Attending Physician: José Luis Langford MD  Primary Care Provider: Carlin Swift MD    [facility] Medicine Team: [facility] HOSP MED A José Luis Langford MD    Subjective:     Principal Problem:Acute on chronic combined systolic and diastolic heart failure    HPI:   54 y/o M with PMH combined CHF (last Echo 4/2019 with EF 23%, diastolic dysfuction, moderated MR and TR), HTN, chronic A. fib (on coumadin), A. flutter s/p ablation, CVA (in 2009), CAD, ?PE (patient reported), non-compliance, and drug seeking behavior presents c/o of right shoulder/arm pain following an injury from right leg weakness. Patient reports waking up to go to the restroom during the night approximately 4 days ago when he was unable to bare weight on right leg due to weakness. He attempted to get back in the bed and aggravated/injured his right shoulder climbing back into bed. He was hoping the pain would resolve on its own but it has persisted for past 4 days prompting him to come to ED. He reports he can move his right upper extremity but refuses to because he does not want to illicit the pain.  Pt denies fever, chills, appetite change, wt change, CP, SOB (at baseline, 2 pillow orthopnea), palpitations, increased leg swelling (chronic leg swelling and venous stasis changes), N/V/D, confusion, slurred speech, dysphagia, seizures, tremors, syncope.     Through chart review, patient seen in ED for various pain complaints in the past.      On 4/6/17 per Dr. Garza's note: chronic LE heel/ankle pain that initially was thought secondary to LE swelling, and possibly gout but as swelling decreased and no improvement with colchicine.  Pt initially receiving opiates, but this was titrated down and patient was started on Gabapentin, at 400mg BID.  MRI revealed no  structural abnormality, there was concern for possible left heel bursitis.  However pain appears out of proportion to exam and findings. Would not recommend any chronic opiate treatment for this pain.      Per discharge note on 4/24/19, patient did ask for a pain medication prescription on discharge, which he does not have an indication for.       On last hospital admission (5/14/19 - 5/17/19): Mr. Terrell's hospitalization was complicated by aggressive behavior and agitation, including threatening remarks made against provider. Patient complained of chest pain, workup for ACS was negative, and the pain improved with GI medications, which is consistent with the etiology of either hearburn/GERD or esophagitis. He was recommended to utilize a PPI and either Maalox or carafate, but on discussing this issue, he continued to escalate his behavior and consistently request narcotic pain medication. Patient threatening to return to ED if discharged. Security was present during discharge counseling session due to the threats.      In 2018, patient prescribed [place] by 4 different prescribers at various times. Last opioid prescription written in May 2019 by an ER physician.     In the ED: Afebrile without leukocytosis on arrival. Hypertensive at 140s-150s/80s. Hgb 8.7 (baseline). ESR elevated >120, CRP 25.9. INR non-therapeutic (1.2). EKG with A. Fib. Alk Phos 236, bili 1.8. Tox screen positive for benzos and opiates. UA unremarkable. CT head without acute intracranial abnormalities. R shoulder XR with no acute fracture. MRI brain with chronic infarcts. Cervical, lumbar, and thoracic spine MRI limited due to artifact but showed chronic changes and C5-6 level demonstrates appearance of disc osteophyte disease mild anterior impression upon the dural sac without high-grade stenosis.  Foraminal evaluation is limited, there is suggestion of foraminal encroachment on the right.  Correlation for exiting nerve root symptomatology is  "needed.     Hospital Course:   MSK/Neuro complaints  Patient completed MRI Brain and MRI entire spine that did not reveal acute finding to explain patient's unilateral symptoms.  Pt does have some abnormalities on spinal MRI as per report and HPI.     On hospital day 2, patient had a fever overnight, had same ongoing shoulder pain and reported inability to move right leg. Orthopedic surgery consulted to eval right shoulder for possible septic joint, s/p arthrocentesis, not enough fluid for cell count but fluid cultures pending, gram stain negative for organism.  Empiric Vanc & Zosyn started for one day.   MRI shoulder ordered but patient unable to tolerate on multiple attempts, and despite use of Versed 1mg pre-medication.     Neurology consulted, but on HD3, patient refused to comply with examination. On repeat Neurology evaluation, felt exam and findings were non-pathologic.  Of note Neurology notes Arceo's sign positive - "Arceo's sign positive on exam, pattern and history highly-concerning for a non-neurologic/anatomic etiology" Neurology signed off.     CHF  Patient on exam is notable for diffuse 4+ pitting edema below the knee and dependent areas of legs, concern for decompensated heart failure, his Creatinine was rising through initial hospitalization, and patient reported lack of diuretic response to 160mg IV lasix on admission.  In the past for his CHF exacerbations doses of 80mg IV lasix used.   Due to lack of diuretic response increased low grade Afib with RVR.  New ECho ordered. Tried on lasix drip and Dobutamine (6hrs) loss of IV access terminated therapy and ultimately cardiology consulted.     Due to interruptions in IV access and patient refusal at attempts, his Cr priti to 1.9.  Eventually started on bolus dose diuretics with intermittent metolazone, Cardiology recommending stopping dobutamine and switching to IV lasix infusion on 7/6.     Behavioral  Per HPI summary, during past admissions " "concerns raised by medical teams over pain seeking behavior, and also concerns in past over verbally abusive behavior toward medical staff.   This admission after IV access lost patient refused to allow overnight staff to attempt, refused PICC team to attempt, refused ECHO test numerous times on multiple days.  Patient would report not wanting to move into bed would want to stay in chair and stated he needed sleep.  On repeat discussions with other medical staff present pt would say he will participate and accept diagnostic test, but when checking in with nursing would hear of recurrent behaviors of refusing nursing assessments, refusing diagnostic tests.   Multiple attempts at PT/OT evals patient has declined.  When questioned patient will ask "when did that happen" or tell me he'll comply next time.     Interval History:     Diuresed 2775 uop  No standing weight this AM, on questioning pt reports if it has rails he could stand on scale - will ask RN this evening to weigh patient     Hgb 7.1 - discussed blood consent, pt declines any blood transfusion., refusal form signed and in chart, monitor iron/anemia studies.     Cr is stable at 1.9, bun elevated.  Pt overall does not note significant improvement     Given lack of septic arthritis (per MRI/synovial culture) Neurology w/o concern for acute pathology - stop IV opiates.  Morphine IR PRN.   -Gabapentin ordered pt encouraged to use but has refused dosages.     ECHO remains pending.     Review of Systems   Constitutional: Negative for chills, diaphoresis and fever.   HENT: Negative for sore throat.    Eyes: Negative for visual disturbance.   Respiratory: Negative for shortness of breath.    Cardiovascular: Positive for leg swelling. Negative for chest pain and palpitations.   Gastrointestinal: Negative for abdominal pain, constipation and diarrhea.   Skin: Negative for color change.     Objective:     Vital Signs (Most Recent):  Temp: 97.8 °F (36.6 °C) (07/07/19 " 1653)  Pulse: 79 (07/07/19 1800)  Resp: 16 (07/07/19 1653)  BP: (!) 123/58 (07/07/19 1653)  SpO2: 100 % (07/07/19 1653) Vital Signs (24h Range):  Temp:  [97.8 °F (36.6 °C)-98.9 °F (37.2 °C)] 97.8 °F (36.6 °C)  Pulse:  [] 79  Resp:  [16-20] 16  SpO2:  [90 %-100 %] 100 %  BP: ()/(54-87) 123/58     Weight: (patient refuse standing weight nurse was told)  Body mass index is 38.54 kg/m².    Intake/Output Summary (Last 24 hours) at 7/7/2019 1836  Last data filed at 7/7/2019 1600  Gross per 24 hour   Intake 1200 ml   Output 3075 ml   Net -1875 ml      Physical Exam   HENT:   Mouth/Throat: No oropharyngeal exudate.   Eyes: Pupils are equal, round, and reactive to light. No scleral icterus.   Neck: JVD present.   Cardiovascular:   Irregular rhythm,  4+ Pitting edema to knee and dependent portion of thighs   Pulmonary/Chest: Effort normal and breath sounds normal. No stridor. No respiratory distress. He has no wheezes.   Abdominal: Soft. Bowel sounds are normal. He exhibits no distension. There is no tenderness.   Musculoskeletal: He exhibits edema.   0/5 strength in RLE, RUE bicep/tricep flexion in discussion with pt at bedside, unable to range or tolerate passive ROM of shoulder on exam   Neurological:   Reflex Scores:       Bicep reflexes are 0 on the right side and 2+ on the left side.       Brachioradialis reflexes are 0 on the right side and 2+ on the left side.  Skin: Skin is warm and dry.       Significant Labs:   CMP:   Recent Labs   Lab 07/05/19 2055 07/06/19  0359 07/07/19  0638    135* 136   K 3.9 4.0 3.6   CL 99 96 93*   CO2 23 28 29   * 89 91   BUN 38* 38* 49*   CREATININE 2.0* 1.9* 1.9*   CALCIUM 9.4 9.6 9.9   PROT 8.6* 8.2 8.9*   ALBUMIN 3.2* 3.0* 3.3*   BILITOT 1.7* 1.4* 1.2*   ALKPHOS 177* 170* 189*   AST 19 17 21   ALT 7* 8* 9*   ANIONGAP 14 11 14   EGFRNONAA 37.3* 39.7* 39.7*     Cardiac Markers:   No results for input(s): CKMB, MYOGLOBIN, BNP, TROPISTAT in the last 48  hours.  Lactic Acid:   No results for input(s): LACTATE in the last 48 hours.  Results for DIANN BARRERA (MRN [id number]) as of 7/4/2019 11:09   Ref. Range 7/4/2019 04:29   Uric Acid Latest Ref Range: 3.4 - 7.0 mg/dL 12.1 (H)     Results for DIANN BARRERA (MRN [id number]) as of 7/4/2019 11:09   Ref. Range 7/2/2019 22:15   Sed Rate Latest Ref Range: 0 - 23 mm/Hr >120 (H)       Significant Imaging: I have reviewed all pertinent imaging results/findings within the past 24 hours.    MRI Shoulder Non-COntrast  No evidence for complete cuff tear though assessment for underlying partial tear is limited given incomplete study.    Questionable linear edema interposed between the posterior cuff and deltoid musculature.  Repeat assessment would be helpful to evaluate for muscular strain.    Additional limited findings as above.    Electronically signed by resident: Abraham Tovar  Date: 07/06/2019    Assessment/Plan:      Acute on Chronic Combined systolic/diastolic heart failure   -given metolazone and 120mg IV lasix in AM, this afternoon 120mg IV lasix and start lasix drip  -Repeat ECHO ordered pending- pt refused again today       Right sided weakness & acute pain   -etiology remains unclear, s/p MRI Brain/C/T/L spine  -Patient has chronic elevation of Uric Acid, prior synovial fluid studies negative for gout/pseudogout crystals   -s/p Right shoulder arthrocentesis - gram stain negative, culture Aerobic - No Growth  -Neurology consult - evaluated, concern for non-pathologic pattern/etiology of pain and weakness    -Lack of participation in medical care/testing is limiting clinical evaluations.   -Concern possible conversion d/o, functional d/o vs pain seeking   >pt c/o of intolerance to exam with light touch to areas of right sided extremities, but can tolerate getting to the bathroom (unwitnessed)  RN has seen patient raise up out of chair using both arms which would require shoulder extension    Febrile  ILlness  -resolved  -febrile on 7/4 @ 0500. Afebrile since.   -procal elevated, blood cultures and remain NGTD  -initial UA w/o signs of infection  -CXR w/o signs of new infiltrate  -Stopping Vanc & Zosyn   -synovial fluid shows no growth so far  -no repeat fevers    Atrial Fibrillation   -rate controlled on toprol   telemetry    JEAN-PIERRE on CKD   -see heart failure  -Urine studies ordered and pending  -bladder scan, if retaining will need monroe catheter  -may also need monroe   -Suspect cardiorenal syndrome. Delayed and inadequate diuresis to this point. Urine lytes both FeNa 0.4% and FeUrea 14% represent Pre-renal etiology in this case. Continue diuresis.     Hx of VTE  -Coumadin @10mg   -subtherapeutic  -no plans for surgery - start Therapeutic Lovenox given high CHADS-VASC score  -patient has refused doses of lovenox intermittently     Anemia of iron deficiency  -normal ferritin but low serum iron and saturation on April studies  -Pt declines blood product transfusion, refusal signed  -check iron studies/retic    Active Diagnoses:    Diagnosis Date Noted POA    PRINCIPAL PROBLEM:  Acute on chronic combined systolic and diastolic heart failure [I50.43] 11/18/2013 Yes    Febrile illness, acute [R50.9] 07/04/2019 No    Acute pain of right shoulder due to trauma [M25.511, G89.11] 07/03/2019 Yes    Atrial fibrillation, chronic [I48.2] 01/02/2013 Yes     Chronic    Weakness of lower extremity [R29.898] 07/03/2019 Yes    JEAN-PIERRE (acute kidney injury) [N17.9] 06/03/2016 Yes    Shoulder pain, right [M25.511] 07/04/2019 Yes    Essential hypertension [I10] 01/02/2013 Yes     Chronic    Personal history of venous thrombosis and embolism [Z86.718] 09/22/2013 Not Applicable    Chronic anticoagulation [Z79.01] 01/01/2013 Not Applicable     Chronic    Chronic kidney disease [N18.9] 05/31/2016 Yes     Chronic    Lumbar back pain [M54.5] 07/06/2015 Yes    Venous stasis of lower extremity [I87.8] 05/15/2019 Yes    Neuropathy  of both feet [G57.93] 07/03/2019 Yes    Prediabetes [R73.03] 07/03/2019 Yes    Elevated alkaline phosphatase level [R74.8] 12/10/2016 Yes    Non compliance w medication regimen [Z91.14] 01/01/2013 Not Applicable     Chronic    History of CVA (cerebrovascular accident) [Z86.73] 01/01/2013 Not Applicable      Problems Resolved During this Admission:     VTE Risk Mitigation (From admission, onward)        Ordered     warfarin (COUMADIN) tablet 10 mg  Daily      07/06/19 0949     enoxaparin injection 120 mg  Every 12 hours (non-standard times)      07/05/19 2210     IP VTE HIGH RISK PATIENT  Once      07/03/19 0955     Place sequential compression device  Until discontinued      07/03/19 0955     Place CAMI hose  Until discontinued      07/03/19 0841             José Luis Langford MD  Department of Hospital Medicine   [facility]

## 2019-07-07 NOTE — PROGRESS NOTES
[facility]  Orthopedics  Progress Note    Patient Name: Hamlet Terrell  MRN: [id number]  Admission Date: 7/2/2019  Hospital Length of Stay: 3 days  Attending Provider: José Luis Langford MD  Primary Care Provider: Carlin Swift MD    Subjective:     Principal Problem:Acute on chronic combined systolic and diastolic heart failure    Principal Orthopedic Problem: right shoulder pain    Interval History: pain in the shoulder is controlled. Cultures have not grown anything as of yet. Afebrile.     Review of patient's allergies indicates:   Allergen Reactions    Acetaminophen      Itching    Oxycodone-acetaminophen      Other reaction(s): Itching    Ace inhibitors Other (See Comments)     cough       Current Facility-Administered Medications   Medication    albuterol inhaler 1 puff    aspirin EC tablet 81 mg    calcium carbonate 200 mg calcium (500 mg) chewable tablet 500 mg    diphenhydrAMINE capsule 25 mg    enoxaparin injection 120 mg    furosemide (LASIX) 2 mg/mL in sodium chloride 0.9% 100 mL infusion (conc: 2 mg/mL)    gabapentin 250 mg/5 mL (5 mL) solution 300 mg    HYDROmorphone injection 0.5 mg    HYDROmorphone injection 1 mg    isosorbide-hydrALAZINE 20-37.5 mg per tablet 1 tablet    lidocaine 5 % patch 3 patch    methyl salicylate-menthol 15-10% cream    metoprolol succinate (TOPROL-XL) 24 hr tablet 25 mg    midazolam (VERSED) 1 mg/mL injection 1 mg    morphine injection 4 mg    nitroGLYCERIN SL tablet 0.4 mg    ondansetron injection 4 mg    pantoprazole EC tablet 40 mg    polyethylene glycol packet 17 g    ramelteon tablet 8 mg    senna tablet 8.6 mg    sodium chloride 0.9% flush 10 mL    sodium chloride 0.9% flush 10 mL    warfarin (COUMADIN) tablet 10 mg     Objective:     Vital Signs (Most Recent):  Temp: 98.6 °F (37 °C) (07/07/19 0859)  Pulse: (!) 55 (07/07/19 0859)  Resp: 16 (07/07/19 0859)  BP: 127/66 (07/07/19 0859)  SpO2: (!) 91 % (07/07/19 0859) Vital  "Signs (24h Range):  Temp:  [97.8 °F (36.6 °C)-98.9 °F (37.2 °C)] 98.6 °F (37 °C)  Pulse:  [] 55  Resp:  [16-20] 16  SpO2:  [90 %-99 %] 91 %  BP: ()/(54-87) 127/66     Weight: (patient refuse standing weight nurse was told)  Height: 5' 9" (175.3 cm)  Body mass index is 38.54 kg/m².      Intake/Output Summary (Last 24 hours) at 7/7/2019 0910  Last data filed at 7/7/2019 0549  Gross per 24 hour   Intake 660 ml   Output 2775 ml   Net -2115 ml       Ortho/SPM Exam   RUE  Skin intact, no deformity noted  No open wounds/abrasions/laceration  TTP over R neck, R sterno clavicular joint, R clavicle. R shoulder diffusely, and R trapezius  Pain in all of these locations with ROM of R shoulder  Able to actively abduct to 30 degrees before pain limited  Able to passively abduct and flex to 90 deg with pain  SILT M/U/R  Motor intact AIN/PIN/M/U/R   Cap refill < 2s  2+ RP    Significant Labs: All pertinent labs within the past 24 hours have been reviewed.    Significant Imaging: I have reviewed all pertinent imaging results/findings.   MRI without any evidence of joint effusion    Assessment/Plan:     Shoulder pain, right  Pt is a 52 yo M with severe CHF who presents with acute onset of RUE pain, total RLE weakness, and R foot numbness. MRI of CTL spine do not indicate source of neuro deficits. No cord or foraminal compression seen. Upper extremity pain is diffuse and not limited to the shoulder joint. MRI obtained of the R shoulder shows no evidence of joint effusion. There is some edema noted between muscular layers, possibly suggestive of a rotator cuff tear. Cultures have NGTD. No concern for septic arthritis of the shoulder. Will continue to follow cultures.           Thang Aguirre MD  Orthopedics  [facility]  "

## 2019-07-07 NOTE — SUBJECTIVE & OBJECTIVE
Interval History: Doing well put out 2L yday on Lasix gtt    Review of Systems   Constitution: Negative for chills, decreased appetite and diaphoresis.   HENT: Negative for congestion and ear discharge.    Eyes: Negative for blurred vision and discharge.   Cardiovascular: Negative for chest pain, dyspnea on exertion, irregular heartbeat, leg swelling and paroxysmal nocturnal dyspnea.   Respiratory: Negative for cough, hemoptysis and shortness of breath.    Gastrointestinal: Negative for abdominal pain.     Objective:     Vital Signs (Most Recent):  Temp: 98.8 °F (37.1 °C) (07/07/19 0332)  Pulse: (!) 117 (07/07/19 0749)  Resp: 16 (07/07/19 0332)  BP: 126/87 (07/07/19 0332)  SpO2: 97 % (07/07/19 0332) Vital Signs (24h Range):  Temp:  [97.8 °F (36.6 °C)-98.9 °F (37.2 °C)] 98.8 °F (37.1 °C)  Pulse:  [] 117  Resp:  [16-20] 16  SpO2:  [90 %-99 %] 97 %  BP: ()/(54-87) 126/87     Weight: (patient refuse standing weight nurse was told)  Body mass index is 38.54 kg/m².     SpO2: 97 %  O2 Device (Oxygen Therapy): nasal cannula      Intake/Output Summary (Last 24 hours) at 7/7/2019 0841  Last data filed at 7/7/2019 0549  Gross per 24 hour   Intake 660 ml   Output 2775 ml   Net -2115 ml       Lines/Drains/Airways     Peripheral Intravenous Line                 Peripheral IV - Single Lumen 07/05/19 1545 20 G Left Hand 1 day                Physical Exam   Constitutional: He is oriented to person, place, and time. He appears well-developed and well-nourished.   HENT:   Head: Normocephalic and atraumatic.   Eyes: Pupils are equal, round, and reactive to light. Conjunctivae and EOM are normal.   Neck: JVD present.   JVD to manible     Cardiovascular: Normal rate, normal heart sounds and intact distal pulses. An irregularly irregular rhythm present. Frequent extrasystoles are present. Exam reveals no gallop and no friction rub.   No murmur heard.  Pulmonary/Chest: Effort normal. No stridor. He has no wheezes. He has rales.  He exhibits no tenderness.   Abdominal: Soft. Bowel sounds are normal. He exhibits no distension and no mass. There is no tenderness. There is no guarding.   Musculoskeletal: He exhibits edema. He exhibits no tenderness or deformity.   Neurological: He is alert and oriented to person, place, and time. No sensory deficit.   Skin: Skin is warm and dry. No rash noted. No erythema.   Psychiatric: He has a normal mood and affect.       Significant Labs:   BMP:   Recent Labs   Lab 07/05/19  0856 07/05/19 2055 07/06/19  0359   * 139* 89    136 135*   K 3.8 3.9 4.0   CL 99 99 96   CO2 25 23 28   BUN 33* 38* 38*   CREATININE 1.9* 2.0* 1.9*   CALCIUM 9.2 9.4 9.6   MG 1.8 2.0 2.0       Significant Imaging: Echocardiogram:   Transthoracic echo (TTE) complete (Cupid Only):   Results for orders placed or performed during the hospital encounter of 04/15/19   Transthoracic echo (TTE) complete (Cupid Only)   Result Value Ref Range    Ascending aorta 3.68 cm    STJ 3.01 cm    AV mean gradient 3.07 mmHg    Ao peak karri 1.15 m/s    Ao VTI 18.57 cm    IVRT 0.09 msec    IVS 1.21 (A) 0.6 - 1.1 cm    LA size 6.18 cm    Left Atrium Major Axis 7.29 cm    Left Atrium Minor Axis 7.51 cm    LVIDD 5.88 3.5 - 6.0 cm    LVIDS 5.12 (A) 2.1 - 4.0 cm    LVOT diameter 2.52 cm    LVOT peak VTI 9.64 cm    PW 1.21 (A) 0.6 - 1.1 cm    MV Peak E Karri 1.16 m/s    RA Major Axis 7.04 cm    RA Width 4.87 cm    RVDD 5.78 cm    Sinus 3.36 cm    TAPSE 1.56 cm    TR Max Karri 2.85 m/s    TDI LATERAL 0.06     TDI SEPTAL 0.04     LA WIDTH 5.16 cm    LV Diastolic Volume 172.13 mL    LV Systolic Volume 124.91 mL    RV S' 5.90 m/s    LVOT peak karri [id number] m/s    LV LATERAL E/E' RATIO 19.33     LV SEPTAL E/E' RATIO 29.00     FS 13 %    LA volume 200.54 cm3    LV mass 307.18 g    Left Ventricle Relative Wall Thickness 0.41 cm    AV valve area 2.59 cm2    AV Velocity Ratio 0.57     AV index (prosthetic) 0.52     Mean e' 0.05     LVOT area 4.99 cm2     LVOT stroke volume 48.06 cm3    AV peak gradient 5.29 mmHg    E/E' ratio 23.20     LV Systolic Volume Index 53.1 mL/m2    LV Diastolic Volume Index 73.24 mL/m2    LA Volume Index 85.3 mL/m2    LV Mass Index 130.7 g/m2    Triscuspid Valve Regurgitation Peak Gradient 32.49 mmHg    BSA 2.45 m2    Right Atrial Pressure (from IVC) 15 mmHg    TV rest pulmonary artery pressure 47 mmHg    Narrative    · Severely decreased left ventricular systolic function. The estimated   ejection fraction is 23%  · Eccentric left ventricular hypertrophy.  · Left ventricular diastolic dysfunction.  · Septal wall has abnormal motion.  · Local segmental wall motion abnormalities.  · Severe left atrial enlargement.  · Moderate right ventricular enlargement.  · Mildly reduced right ventricular systolic function.  · Severe right atrial enlargement.  · Moderate mitral regurgitation.  · Moderate tricuspid regurgitation.  · Elevated central venous pressure (15 mm Hg).  · The estimated PA systolic pressure is 47 mm Hg  · Pulmonary hypertension present.  · Trivial Pericardial effusion.

## 2019-07-07 NOTE — PROGRESS NOTES
[facility]  Cardiology  Progress Note    Patient Name: Hamlet Terrell  MRN: [id number]  Admission Date: 7/2/2019  Hospital Length of Stay: 3 days  Code Status: Full Code   Attending Physician: José Luis Langford MD   Primary Care Physician: Carlin Swift MD  Expected Discharge Date: 7/9/2019  Principal Problem:Acute on chronic combined systolic and diastolic heart failure    Subjective:     Hospital Course:   No notes on file    Interval History: Doing well put out 2L yday on Lasix gtt    Review of Systems   Constitution: Negative for chills, decreased appetite and diaphoresis.   HENT: Negative for congestion and ear discharge.    Eyes: Negative for blurred vision and discharge.   Cardiovascular: Negative for chest pain, dyspnea on exertion, irregular heartbeat, leg swelling and paroxysmal nocturnal dyspnea.   Respiratory: Negative for cough, hemoptysis and shortness of breath.    Gastrointestinal: Negative for abdominal pain.     Objective:     Vital Signs (Most Recent):  Temp: 98.8 °F (37.1 °C) (07/07/19 0332)  Pulse: (!) 117 (07/07/19 0749)  Resp: 16 (07/07/19 0332)  BP: 126/87 (07/07/19 0332)  SpO2: 97 % (07/07/19 0332) Vital Signs (24h Range):  Temp:  [97.8 °F (36.6 °C)-98.9 °F (37.2 °C)] 98.8 °F (37.1 °C)  Pulse:  [] 117  Resp:  [16-20] 16  SpO2:  [90 %-99 %] 97 %  BP: ()/(54-87) 126/87     Weight: (patient refuse standing weight nurse was told)  Body mass index is 38.54 kg/m².     SpO2: 97 %  O2 Device (Oxygen Therapy): nasal cannula      Intake/Output Summary (Last 24 hours) at 7/7/2019 0841  Last data filed at 7/7/2019 0549  Gross per 24 hour   Intake 660 ml   Output 2775 ml   Net -2115 ml       Lines/Drains/Airways     Peripheral Intravenous Line                 Peripheral IV - Single Lumen 07/05/19 1545 20 G Left Hand 1 day                Physical Exam   Constitutional: He is oriented to person, place, and time. He appears well-developed and well-nourished.   HENT:   Head:  Normocephalic and atraumatic.   Eyes: Pupils are equal, round, and reactive to light. Conjunctivae and EOM are normal.   Neck: JVD present.   JVD to manible     Cardiovascular: Normal rate, normal heart sounds and intact distal pulses. An irregularly irregular rhythm present. Frequent extrasystoles are present. Exam reveals no gallop and no friction rub.   No murmur heard.  Pulmonary/Chest: Effort normal. No stridor. He has no wheezes. He has rales. He exhibits no tenderness.   Abdominal: Soft. Bowel sounds are normal. He exhibits no distension and no mass. There is no tenderness. There is no guarding.   Musculoskeletal: He exhibits edema. He exhibits no tenderness or deformity.   Neurological: He is alert and oriented to person, place, and time. No sensory deficit.   Skin: Skin is warm and dry. No rash noted. No erythema.   Psychiatric: He has a normal mood and affect.       Significant Labs:   BMP:   Recent Labs   Lab 07/05/19  0856 07/05/19 2055 07/06/19  0359   * 139* 89    136 135*   K 3.8 3.9 4.0   CL 99 99 96   CO2 25 23 28   BUN 33* 38* 38*   CREATININE 1.9* 2.0* 1.9*   CALCIUM 9.2 9.4 9.6   MG 1.8 2.0 2.0       Significant Imaging: Echocardiogram:   Transthoracic echo (TTE) complete (Cupid Only):   Results for orders placed or performed during the hospital encounter of 04/15/19   Transthoracic echo (TTE) complete (Cupid Only)   Result Value Ref Range    Ascending aorta 3.68 cm    STJ 3.01 cm    AV mean gradient 3.07 mmHg    Ao peak karri 1.15 m/s    Ao VTI 18.57 cm    IVRT 0.09 msec    IVS 1.21 (A) 0.6 - 1.1 cm    LA size 6.18 cm    Left Atrium Major Axis 7.29 cm    Left Atrium Minor Axis 7.51 cm    LVIDD 5.88 3.5 - 6.0 cm    LVIDS 5.12 (A) 2.1 - 4.0 cm    LVOT diameter 2.52 cm    LVOT peak VTI 9.64 cm    PW 1.21 (A) 0.6 - 1.1 cm    MV Peak E Karri 1.16 m/s    RA Major Axis 7.04 cm    RA Width 4.87 cm    RVDD 5.78 cm    Sinus 3.36 cm    TAPSE 1.56 cm    TR Max Karri 2.85 m/s    TDI LATERAL 0.06      TDI SEPTAL 0.04     LA WIDTH 5.16 cm    LV Diastolic Volume 172.13 mL    LV Systolic Volume 124.91 mL    RV S' 5.90 m/s    LVOT peak ozzie [id number] m/s    LV LATERAL E/E' RATIO 19.33     LV SEPTAL E/E' RATIO 29.00     FS 13 %    LA volume 200.54 cm3    LV mass 307.18 g    Left Ventricle Relative Wall Thickness 0.41 cm    AV valve area 2.59 cm2    AV Velocity Ratio 0.57     AV index (prosthetic) 0.52     Mean e' 0.05     LVOT area 4.99 cm2    LVOT stroke volume 48.06 cm3    AV peak gradient 5.29 mmHg    E/E' ratio 23.20     LV Systolic Volume Index 53.1 mL/m2    LV Diastolic Volume Index 73.24 mL/m2    LA Volume Index 85.3 mL/m2    LV Mass Index 130.7 g/m2    Triscuspid Valve Regurgitation Peak Gradient 32.49 mmHg    BSA 2.45 m2    Right Atrial Pressure (from IVC) 15 mmHg    TV rest pulmonary artery pressure 47 mmHg    Narrative    · Severely decreased left ventricular systolic function. The estimated   ejection fraction is 23%  · Eccentric left ventricular hypertrophy.  · Left ventricular diastolic dysfunction.  · Septal wall has abnormal motion.  · Local segmental wall motion abnormalities.  · Severe left atrial enlargement.  · Moderate right ventricular enlargement.  · Mildly reduced right ventricular systolic function.  · Severe right atrial enlargement.  · Moderate mitral regurgitation.  · Moderate tricuspid regurgitation.  · Elevated central venous pressure (15 mm Hg).  · The estimated PA systolic pressure is 47 mm Hg  · Pulmonary hypertension present.  · Trivial Pericardial effusion.        Assessment and Plan:       * Acute on chronic combined systolic and diastolic heart failure  ADHF in the setting of dietary, fluid and medication noncompliance   - Profile B, NYHA III,      - NICM, negative PET stress    - Most recent TTE demonstrates: LVef 23%,    - Pro-    - Continue Lasix gtt @ 10, great UOP (-ve 2L overnight)  - Continue Toprol 25mg qday, Bidil 37.5-20 bid , ASA 81mg,   - Will consider  Aldactone / ARB / ARNI once renal function stable and euvolemic   - Need to follow up with EP outpatient for ICD consideration   - Need to be evaluated by HTS outpatient if able to comply with medical management  - Fluid restriction at 1500 cc with strict I/Os and daily STANDING weights  - Pending echocardiogram  - Maintain on telemetry and daily EKGs   - Check Electrolytes, keep Mag >2 & K+ >4  - SCDs, TEDs, Nursing communication to elevated LE   - Ambulate as tolerated              Weakness of lower extremity  - Recommend further evaluation of his RLE weakness, possible CVA. Extremity warmth and nontender doubt embolic source.   - Recommend follow up with Neurology   - PT / OT to assess R shoulder and RLE   - Consider further imaging     Atrial fibrillation, chronic  Permanent Afib:   - CHADS-VASC 3  - History of CVA 2009 and presumed TIA on this admission, PATIENT NEEDS BRIDGING.   - Continue Lovenox 1mg/kg BID with Coumadin  - Continue home Toprol 25mg qday  - Goal INR 3 - 2   - Maintain K > 4, Mag > 2 and Ca/iCal WNL to decrease arrhythmogenic potential  - Maintain on telemetry and daily morning EKGs for the next few days            VTE Risk Mitigation (From admission, onward)        Ordered     warfarin (COUMADIN) tablet 10 mg  Daily      07/06/19 0949     enoxaparin injection 120 mg  Every 12 hours (non-standard times)      07/05/19 2210     IP VTE HIGH RISK PATIENT  Once      07/03/19 0955     Place sequential compression device  Until discontinued      07/03/19 0955     Place CAMI hose  Until discontinued      07/03/19 0841          Nan Murrell MD  Cardiology  [facility]

## 2019-07-07 NOTE — ASSESSMENT & PLAN NOTE
Pt is a 52 yo M with severe CHF who presents with acute onset of RUE pain, total RLE weakness, and R foot numbness. MRI of CTL spine do not indicate source of neuro deficits. No cord or foraminal compression seen. Upper extremity pain is diffuse and not limited to the shoulder joint. MRI obtained of the R shoulder shows no evidence of joint effusion. There is some edema noted between muscular layers, possibly suggestive of a rotator cuff tear. Cultures have NGTD. No concern for septic arthritis of the shoulder. Will continue to follow cultures.

## 2019-07-08 LAB
ALBUMIN SERPL BCP-MCNC: 3.4 G/DL (ref 3.5–5.2)
ALDOLASE SERPL-CCNC: 2 U/L (ref 1.2–7.6)
ALP SERPL-CCNC: 196 U/L (ref 55–135)
ALT SERPL W/O P-5'-P-CCNC: 10 U/L (ref 10–44)
ANION GAP SERPL CALC-SCNC: 12 MMOL/L (ref 8–16)
ANION GAP SERPL CALC-SCNC: 13 MMOL/L (ref 8–16)
ARSENIC BLD-MCNC: NORMAL UG/DL
AST SERPL-CCNC: 23 U/L (ref 10–40)
BACTERIA SPEC AEROBE CULT: NO GROWTH
BASOPHILS # BLD AUTO: 0.04 K/UL (ref 0–0.2)
BASOPHILS NFR BLD: 0.6 % (ref 0–1.9)
BILIRUB SERPL-MCNC: 1.1 MG/DL (ref 0.1–1)
BUN SERPL-MCNC: 55 MG/DL (ref 6–20)
BUN SERPL-MCNC: 56 MG/DL (ref 6–20)
CADMIUM BLD-MCNC: NORMAL NG/ML
CALCIUM SERPL-MCNC: 10.1 MG/DL (ref 8.7–10.5)
CALCIUM SERPL-MCNC: 10.3 MG/DL (ref 8.7–10.5)
CHLORIDE SERPL-SCNC: 89 MMOL/L (ref 95–110)
CHLORIDE SERPL-SCNC: 90 MMOL/L (ref 95–110)
CITY: NORMAL
CO2 SERPL-SCNC: 32 MMOL/L (ref 23–29)
CO2 SERPL-SCNC: 33 MMOL/L (ref 23–29)
COUNTY: NORMAL
CREAT SERPL-MCNC: 2 MG/DL (ref 0.5–1.4)
CREAT SERPL-MCNC: 2.1 MG/DL (ref 0.5–1.4)
DIFFERENTIAL METHOD: ABNORMAL
EOSINOPHIL # BLD AUTO: 0.3 K/UL (ref 0–0.5)
EOSINOPHIL NFR BLD: 3.7 % (ref 0–8)
ERYTHROCYTE [DISTWIDTH] IN BLOOD BY AUTOMATED COUNT: 16.2 % (ref 11.5–14.5)
EST. GFR  (AFRICAN AMERICAN): 40.6 ML/MIN/1.73 M^2
EST. GFR  (AFRICAN AMERICAN): 43.1 ML/MIN/1.73 M^2
EST. GFR  (NON AFRICAN AMERICAN): 35.1 ML/MIN/1.73 M^2
EST. GFR  (NON AFRICAN AMERICAN): 37.3 ML/MIN/1.73 M^2
FERRITIN SERPL-MCNC: 79 NG/ML (ref 20–300)
GLUCOSE SERPL-MCNC: 125 MG/DL (ref 70–110)
GLUCOSE SERPL-MCNC: 129 MG/DL (ref 70–110)
GUARDIAN FIRST NAME: NORMAL
GUARDIAN LAST NAME: NORMAL
HCT VFR BLD AUTO: 24.5 % (ref 40–54)
HGB BLD-MCNC: 7.5 G/DL (ref 14–18)
HOME PHONE: NORMAL
IMM GRANULOCYTES # BLD AUTO: 0.03 K/UL (ref 0–0.04)
IMM GRANULOCYTES NFR BLD AUTO: 0.4 % (ref 0–0.5)
INR PPP: 1.2 (ref 0.8–1.2)
IRON SERPL-MCNC: 17 UG/DL (ref 45–160)
LEAD BLDV-MCNC: NORMAL UG/DL
LYMPHOCYTES # BLD AUTO: 1 K/UL (ref 1–4.8)
LYMPHOCYTES NFR BLD: 13.7 % (ref 18–48)
MAGNESIUM SERPL-MCNC: 2.1 MG/DL (ref 1.6–2.6)
MAGNESIUM SERPL-MCNC: 2.2 MG/DL (ref 1.6–2.6)
MCH RBC QN AUTO: 23.6 PG (ref 27–31)
MCHC RBC AUTO-ENTMCNC: 30.6 G/DL (ref 32–36)
MCV RBC AUTO: 77 FL (ref 82–98)
MERCURY BLD-MCNC: NORMAL NG/ML
MONOCYTES # BLD AUTO: 1.3 K/UL (ref 0.3–1)
MONOCYTES NFR BLD: 18.3 % (ref 4–15)
NEUTROPHILS # BLD AUTO: 4.4 K/UL (ref 1.8–7.7)
NEUTROPHILS NFR BLD: 63.3 % (ref 38–73)
NRBC BLD-RTO: 0 /100 WBC
PLATELET # BLD AUTO: 315 K/UL (ref 150–350)
PMV BLD AUTO: 9.4 FL (ref 9.2–12.9)
POTASSIUM SERPL-SCNC: 3.3 MMOL/L (ref 3.5–5.1)
POTASSIUM SERPL-SCNC: 3.5 MMOL/L (ref 3.5–5.1)
PROT SERPL-MCNC: 9.2 G/DL (ref 6–8.4)
PROTHROMBIN TIME: 12.1 SEC (ref 9–12.5)
RACE: NORMAL
RBC # BLD AUTO: 3.18 M/UL (ref 4.6–6.2)
RETICS/RBC NFR AUTO: 1 % (ref 0.4–2)
SATURATED IRON: 4 % (ref 20–50)
SODIUM SERPL-SCNC: 134 MMOL/L (ref 136–145)
SODIUM SERPL-SCNC: 135 MMOL/L (ref 136–145)
STATE: NORMAL
STREET ADDRESS: NORMAL
TOTAL IRON BINDING CAPACITY: 451 UG/DL (ref 250–450)
TRANSFERRIN SERPL-MCNC: 305 MG/DL (ref 200–375)
VENOUS/CAPILLARY: NORMAL
WBC # BLD AUTO: 6.99 K/UL (ref 3.9–12.7)
ZIP: NORMAL

## 2019-07-08 PROCEDURE — 93005 ELECTROCARDIOGRAM TRACING: CPT

## 2019-07-08 PROCEDURE — 80053 COMPREHEN METABOLIC PANEL: CPT

## 2019-07-08 PROCEDURE — 97535 SELF CARE MNGMENT TRAINING: CPT

## 2019-07-08 PROCEDURE — 99232 SBSQ HOSP IP/OBS MODERATE 35: CPT | Mod: ,,, | Performed by: INTERNAL MEDICINE

## 2019-07-08 PROCEDURE — 25000003 PHARM REV CODE 250: Performed by: STUDENT IN AN ORGANIZED HEALTH CARE EDUCATION/TRAINING PROGRAM

## 2019-07-08 PROCEDURE — 99232 PR SUBSEQUENT HOSPITAL CARE,LEVL II: ICD-10-PCS | Mod: ,,, | Performed by: INTERNAL MEDICINE

## 2019-07-08 PROCEDURE — 85610 PROTHROMBIN TIME: CPT

## 2019-07-08 PROCEDURE — 85045 AUTOMATED RETICULOCYTE COUNT: CPT

## 2019-07-08 PROCEDURE — 83735 ASSAY OF MAGNESIUM: CPT

## 2019-07-08 PROCEDURE — 25000003 PHARM REV CODE 250: Performed by: HOSPITALIST

## 2019-07-08 PROCEDURE — 25000003 PHARM REV CODE 250: Performed by: PHYSICIAN ASSISTANT

## 2019-07-08 PROCEDURE — 82728 ASSAY OF FERRITIN: CPT

## 2019-07-08 PROCEDURE — 63600175 PHARM REV CODE 636 W HCPCS: Performed by: HOSPITALIST

## 2019-07-08 PROCEDURE — 83540 ASSAY OF IRON: CPT

## 2019-07-08 PROCEDURE — 99231 PR SUBSEQUENT HOSPITAL CARE,LEVL I: ICD-10-PCS | Mod: ,,, | Performed by: PSYCHIATRY & NEUROLOGY

## 2019-07-08 PROCEDURE — 99232 SBSQ HOSP IP/OBS MODERATE 35: CPT | Mod: ,,, | Performed by: HOSPITALIST

## 2019-07-08 PROCEDURE — 20600001 HC STEP DOWN PRIVATE ROOM

## 2019-07-08 PROCEDURE — 63600175 PHARM REV CODE 636 W HCPCS: Performed by: PHYSICIAN ASSISTANT

## 2019-07-08 PROCEDURE — 99231 SBSQ HOSP IP/OBS SF/LOW 25: CPT | Mod: ,,, | Performed by: PSYCHIATRY & NEUROLOGY

## 2019-07-08 PROCEDURE — 99232 PR SUBSEQUENT HOSPITAL CARE,LEVL II: ICD-10-PCS | Mod: ,,, | Performed by: HOSPITALIST

## 2019-07-08 PROCEDURE — 97116 GAIT TRAINING THERAPY: CPT

## 2019-07-08 PROCEDURE — 83735 ASSAY OF MAGNESIUM: CPT | Mod: 91

## 2019-07-08 PROCEDURE — 85025 COMPLETE CBC W/AUTO DIFF WBC: CPT

## 2019-07-08 PROCEDURE — 93005 ELECTROCARDIOGRAM TRACING: CPT | Performed by: INTERNAL MEDICINE

## 2019-07-08 PROCEDURE — 36415 COLL VENOUS BLD VENIPUNCTURE: CPT

## 2019-07-08 PROCEDURE — 97530 THERAPEUTIC ACTIVITIES: CPT

## 2019-07-08 PROCEDURE — 80048 BASIC METABOLIC PNL TOTAL CA: CPT

## 2019-07-08 RX ORDER — METOPROLOL TARTRATE 25 MG/1
25 TABLET, FILM COATED ORAL EVERY 6 HOURS
Status: DISCONTINUED | OUTPATIENT
Start: 2019-07-08 | End: 2019-07-18 | Stop reason: HOSPADM

## 2019-07-08 RX ORDER — FERROUS SULFATE 325(65) MG
325 TABLET, DELAYED RELEASE (ENTERIC COATED) ORAL DAILY
Status: DISCONTINUED | OUTPATIENT
Start: 2019-07-09 | End: 2019-07-16

## 2019-07-08 RX ORDER — COLCHICINE 0.6 MG/1
0.6 TABLET, FILM COATED ORAL DAILY
Status: DISCONTINUED | OUTPATIENT
Start: 2019-07-09 | End: 2019-07-09

## 2019-07-08 RX ORDER — POTASSIUM CHLORIDE 20 MEQ/15ML
40 SOLUTION ORAL ONCE
Status: DISCONTINUED | OUTPATIENT
Start: 2019-07-08 | End: 2019-07-08

## 2019-07-08 RX ORDER — POTASSIUM CHLORIDE 750 MG/1
30 CAPSULE, EXTENDED RELEASE ORAL ONCE
Status: DISCONTINUED | OUTPATIENT
Start: 2019-07-08 | End: 2019-07-08

## 2019-07-08 RX ORDER — ERGOCALCIFEROL 1.25 MG/1
50000 CAPSULE ORAL
Status: DISCONTINUED | OUTPATIENT
Start: 2019-07-09 | End: 2019-07-18 | Stop reason: HOSPADM

## 2019-07-08 RX ADMIN — MENTHOL, METHYL SALICYLATE: 10; 15 CREAM TOPICAL at 09:07

## 2019-07-08 RX ADMIN — POTASSIUM BICARBONATE 50 MEQ: 978 TABLET, EFFERVESCENT ORAL at 11:07

## 2019-07-08 RX ADMIN — POLYETHYLENE GLYCOL 3350 17 G: 17 POWDER, FOR SOLUTION ORAL at 08:07

## 2019-07-08 RX ADMIN — METOPROLOL TARTRATE 25 MG: 25 TABLET, FILM COATED ORAL at 11:07

## 2019-07-08 RX ADMIN — ENOXAPARIN SODIUM 120 MG: 150 INJECTION SUBCUTANEOUS at 12:07

## 2019-07-08 RX ADMIN — GABAPENTIN 300 MG: 250 SOLUTION ORAL at 09:07

## 2019-07-08 RX ADMIN — WARFARIN SODIUM 10 MG: 10 TABLET ORAL at 05:07

## 2019-07-08 RX ADMIN — MORPHINE SULFATE 15 MG: 15 TABLET ORAL at 09:07

## 2019-07-08 RX ADMIN — METOPROLOL TARTRATE 25 MG: 25 TABLET, FILM COATED ORAL at 06:07

## 2019-07-08 RX ADMIN — ENOXAPARIN SODIUM 120 MG: 150 INJECTION SUBCUTANEOUS at 11:07

## 2019-07-08 RX ADMIN — METOPROLOL SUCCINATE 25 MG: 25 TABLET, EXTENDED RELEASE ORAL at 09:07

## 2019-07-08 RX ADMIN — METOPROLOL TARTRATE 25 MG: 25 TABLET, FILM COATED ORAL at 01:07

## 2019-07-08 RX ADMIN — HYDRALAZINE HYDROCHLORIDE AND ISOSORBIDE DINITRATE 1 TABLET: 37.5; 2 TABLET, FILM COATED ORAL at 09:07

## 2019-07-08 RX ADMIN — HYDRALAZINE HYDROCHLORIDE AND ISOSORBIDE DINITRATE 1 TABLET: 37.5; 2 TABLET, FILM COATED ORAL at 08:07

## 2019-07-08 RX ADMIN — ONDANSETRON 4 MG: 2 INJECTION INTRAMUSCULAR; INTRAVENOUS at 03:07

## 2019-07-08 RX ADMIN — MORPHINE SULFATE 15 MG: 15 TABLET ORAL at 02:07

## 2019-07-08 RX ADMIN — ASPIRIN 81 MG: 81 TABLET, COATED ORAL at 09:07

## 2019-07-08 RX ADMIN — GABAPENTIN 300 MG: 250 SOLUTION ORAL at 08:07

## 2019-07-08 RX ADMIN — FUROSEMIDE 10 MG/HR: 10 INJECTION, SOLUTION INTRAMUSCULAR; INTRAVENOUS at 05:07

## 2019-07-08 RX ADMIN — PANTOPRAZOLE SODIUM 40 MG: 40 TABLET, DELAYED RELEASE ORAL at 09:07

## 2019-07-08 RX ADMIN — MENTHOL, METHYL SALICYLATE: 10; 15 CREAM TOPICAL at 08:07

## 2019-07-08 NOTE — ASSESSMENT & PLAN NOTE
Permanent Afib:   - CHADS-VASC 3  - History of CVA 2009 and presumed TIA on this admission, would continue bridging pending therapeutic warfarin  - Continue Lovenox 1mg/kg BID with Coumadin, goal INR 2-3  - Continue home Toprol 25mg qday; will consider increasing tomorrow if feel patient euvolemic, compensating appropriately  - Maintain K > 4, Mag > 2 and Ca/iCal WNL to decrease arrhythmogenic potential  - Maintain on telemetry

## 2019-07-08 NOTE — PLAN OF CARE
Problem: Occupational Therapy Goal  Goal: Occupational Therapy Goal  Goals to be met by: 7/15/19     Patient will increase functional independence with ADLs by performing:    UE Dressing with Moderate Assistance.  LE Dressing with Maximum Assistance.  Grooming while seated with Minimal Assistance.  Toileting from bedside commode with Maximum Assistance for hygiene and clothing management.   Rolling to Bilateral with Maximum Assistance.   Supine to sit with Maximum Assistance.  Stand pivot transfers with Maximum Assistance.  Step transfer with Maximum Assistance  Toilet transfer to bedside commode with Maximum Assistance.     Outcome: Ongoing (interventions implemented as appropriate)  Continue OT POC     Comments: Ceferino Jefferson OTR/L  7/8/2019

## 2019-07-08 NOTE — PLAN OF CARE
Problem: Physical Therapy Goal  Goal: Physical Therapy Goal  Goals to be met by: 2019     Patient will increase functional independence with mobility by performin. Supine to sit with Set-up [place] -not met  2. Sit to supine with Set-up [place] -not met  3. Sit to stand transfer with Stand-by Assistance -not met  4. Bed to chair transfer with Stand-by Assistance using Rolling Walker -not met  5. Gait  x 30 feet with Contact Guard Assistance using Rolling Walker. -not met  6. Lower extremity exercise program x20 reps per handout, with assistance as needed -not met     Goals remain appropriate. Nova Casillas, PT 2019

## 2019-07-08 NOTE — NURSING
TELE AFIB, -134 AT REST. MULTIPLE PVC'S, POTASSIUM 3.5, REPORTED TO DR. MENDEZ, NEW ORDER NOTED FOR STAT EKG, BMP AND MAGNESIUM LEVEL. WILL CONTINUE TO MONITOR.

## 2019-07-08 NOTE — PT/OT/SLP PROGRESS
Physical Therapy  Co-Treatment with OT/Change Plan of Care    Patient Name:  Hamlet Terrell   MRN:  [id number]    Recommendations:     Discharge Recommendations:  nursing facility, skilled   Discharge Equipment Recommendations: (will determine DME needs closer to discharge)   Barriers to discharge: Decreased caregiver support    Assessment:     Hamlet Terrell is a 53 y.o. male admitted with a medical diagnosis of Acute on chronic combined systolic and diastolic heart failure.  He presents with the following impairments/functional limitations:  gait instability, impaired balance, impaired endurance, decreased lower extremity function, impaired functional mobilty pt florina treatment better being able to begin gait training. Pt reported symptomology is inconsistent with pt physical presentation. Pt will benefit from cont skilled PT 3x/wk to progress physically.pt will need SNF placement to maximize rehab potential.     Rehab Prognosis: Fair; patient would benefit from acute skilled PT services to address these deficits and reach maximum level of function.    Recent Surgery: * No surgery found *      Plan:     During this hospitalization, patient to be seen 3 x/week to address the identified rehab impairments via gait training, therapeutic activities, therapeutic exercises and progress toward the following goals:    · Plan of Care Expires:  08/03/19    Subjective     Chief Complaint: pt stated that he could not feel nor move his R leg. Pt c/o pain in R shoulder  Patient/Family Comments/goals: to get better and go home.   Pain/Comfort:  · Pain Rating 1: 0/10  · Pain Rating Post-Intervention 1: 0/10      Objective:     Communicated with nurse prior to session.  Patient found up in chair with telemetry, peripheral IV upon PT entry to room.     General Precautions: Standard, fall   Orthopedic Precautions:N/A   Braces:       Functional Mobility:  · Transfers:   Pt needed verbal cues for hand placement and sequencing for  functional mobility. Pt needed increased time to perform tasks.   · Sit to Stand:  moderate assistance with rolling walker. pt stood x 2 reps and was max assist to bend RLE for proper placement for stand.   ·   · Gait: pt received gait training ~ 3 ft with RW and CGA. pt needed verbal cues for RW sequencing and was Max assist to advance RLE.  pt was able to weight bear on RLE to take step with LLE.   · Balance: pt stood at bedside 1 min 30 sec to simulate brushing teeth. Pt was CGA for standing balance.      AM-PAC 6 CLICK MOBILITY  Turning over in bed (including adjusting bedclothes, sheets and blankets)?: 3  Sitting down on and standing up from a chair with arms (e.g., wheelchair, bedside commode, etc.): 2  Moving from lying on back to sitting on the side of the bed?: 2  Moving to and from a bed to a chair (including a wheelchair)?: 3  Need to walk in hospital room?: 2  Climbing 3-5 steps with a railing?: 1  Basic Mobility Total Score: 13         Patient left up in chair with all lines intact and call button in reach..    GOALS:   Multidisciplinary Problems     Physical Therapy Goals        Problem: Physical Therapy Goal    Goal Priority Disciplines Outcome Goal Variances Interventions   Physical Therapy Goal     PT, PT/OT      Description:  Goals to be met by: 2019     Patient will increase functional independence with mobility by performin. Supine to sit with Set-up [place] -not met  2. Sit to supine with Set-up [place] -not met  3. Sit to stand transfer with Stand-by Assistance -not met  4. Bed to chair transfer with Stand-by Assistance using Rolling Walker -not met  5. Gait  x 30 feet with Contact Guard Assistance using Rolling Walker. -not met  6. Lower extremity exercise program x20 reps per handout, with assistance as needed -not met                       Time Tracking:     PT Received On: 19  PT Start Time: 1108     PT Stop Time: 1129  PT Total Time (min): 21 min     Billable  Minutes: Gait Training 21 min    Treatment Type: Other (see comments)(co-treatment with OT)  PT/PTA: PT     PTA Visit Number: 0     Nova Casillas, PT  07/08/2019

## 2019-07-08 NOTE — PROGRESS NOTES
Notified MD Ingram of 34 beat of v-tach; pt asymptomatic. Labatory at bedside. Placing an order for an EKG. Will continue to monitor patient.

## 2019-07-08 NOTE — ASSESSMENT & PLAN NOTE
ADHF in the setting of dietary, fluid and medication noncompliance   - Profile B, NYHA III,      - NICM, negative PET stress    - Most recent TTE demonstrates: LVef 23%,    - Pro-   - though labs suggestive of volume depletion, patient continues to endorse orthopnea with elevated JVD on exam    - Continue Lasix gtt @ 10; will order CXR and BNP for tmrw AM  - Continue Toprol 25mg qday, Bidil 37.5-20 bid , ASA 81mg  - Will consider Aldactone / ARB / ARNI once renal function stable and euvolemic   - Need to follow up with EP outpatient for ICD consideration   - Need to be evaluated by HTS outpatient if able to comply with medical management  - Fluid restriction at 1500 cc with strict I/Os and daily STANDING weights  - Maintain on telemetry  - repeat TTE declined by patient and not indicated for dCHF on this admission, will cancel for now  - Check Electrolytes, keep Mag >2 & K >4  - SCDs, TEDs, Nursing communication to elevated LE   - Ambulate as tolerated

## 2019-07-08 NOTE — SUBJECTIVE & OBJECTIVE
Interval History: Feeling well this AM, though orthopnea persists. Denies chest pain, palpitations; had reported NSVT overnight though none appreciated on tele.    Review of Systems   Constitution: Negative. Negative for chills and fever.   HENT: Negative.    Eyes: Negative.    Cardiovascular: Positive for orthopnea. Negative for chest pain, claudication and paroxysmal nocturnal dyspnea.   Respiratory: Negative for cough, shortness of breath and wheezing.    Endocrine: Negative.    Hematologic/Lymphatic: Does not bruise/bleed easily.   Skin: Negative for nail changes and rash.   Musculoskeletal: Negative.  Negative for back pain.   Gastrointestinal: Negative for abdominal pain, melena, nausea and vomiting.   Neurological: Negative for dizziness and headaches.   Psychiatric/Behavioral: Negative for altered mental status, depression and substance abuse.   Allergic/Immunologic: Negative.      Objective:     Vital Signs (Most Recent):  Temp: 98.5 °F (36.9 °C) (07/08/19 1201)  Pulse: 86 (07/08/19 1201)  Resp: 18 (07/08/19 1201)  BP: (!) 130/53 (07/08/19 1201)  SpO2: (!) 93 % (07/08/19 1201) Vital Signs (24h Range):  Temp:  [97.8 °F (36.6 °C)-98.8 °F (37.1 °C)] 98.5 °F (36.9 °C)  Pulse:  [] 86  Resp:  [16-18] 18  SpO2:  [91 %-100 %] 93 %  BP: (101-130)/(53-86) 130/53     Weight: (patient refuse nurse was told)  Body mass index is 38.54 kg/m².     SpO2: (!) 93 %  O2 Device (Oxygen Therapy): room air      Intake/Output Summary (Last 24 hours) at 7/8/2019 1307  Last data filed at 7/8/2019 0914  Gross per 24 hour   Intake 1200 ml   Output 1975 ml   Net -775 ml       Lines/Drains/Airways     Peripheral Intravenous Line                 Peripheral IV - Single Lumen 07/05/19 1545 20 G Left Hand 2 days                Physical Exam   Constitutional: He is oriented to person, place, and time. He appears well-developed and well-nourished.   HENT:   Head: Normocephalic and atraumatic.   Eyes: Pupils are equal, round, and  reactive to light. Conjunctivae and EOM are normal.   Neck: JVD present.   Cardiovascular: Normal rate, regular rhythm, normal heart sounds and intact distal pulses. Exam reveals no gallop and no friction rub.   No murmur heard.  Pulmonary/Chest: Effort normal and breath sounds normal. No stridor. He has no wheezes. He has no rales. He exhibits no tenderness.   Abdominal: Soft. Bowel sounds are normal. He exhibits no distension and no mass. There is no tenderness. There is no guarding.   Musculoskeletal: He exhibits no edema, tenderness or deformity.   Neurological: He is alert and oriented to person, place, and time.   Skin: Skin is warm and dry. No rash noted. No erythema.   Chronic lymphedema noted   Psychiatric: He has a normal mood and affect.       Significant Labs:    Lab Results   Component Value Date    WBC 6.99 07/08/2019    HGB 7.5 (L) 07/08/2019    HCT 24.5 (L) 07/08/2019    MCV 77 (L) 07/08/2019     07/08/2019     BMP  Lab Results   Component Value Date     (L) 07/08/2019    K 3.5 07/08/2019    CL 90 (L) 07/08/2019    CO2 32 (H) 07/08/2019    BUN 55 (H) 07/08/2019    CREATININE 2.0 (H) 07/08/2019    CALCIUM 10.3 07/08/2019    ANIONGAP 13 07/08/2019    ESTGFRAFRICA 43.1 (A) 07/08/2019    EGFRNONAA 37.3 (A) 07/08/2019       Significant Imaging: reviewed in Epic

## 2019-07-08 NOTE — NURSING
TELE: AFIB, MULTIPLE PVC'S, HR  AT REST. /53, ASYMPTOMATIC . REPORTED TO DR. DURBIN (EP CONSULT) WILL CONTINUE TO MONITOR.

## 2019-07-08 NOTE — NURSING
Heavy metal (blood) from 7/4/2019 : cancelled by  hematology lab. No need to recollect per Dr. Langford

## 2019-07-08 NOTE — PT/OT/SLP PROGRESS
Occupational Therapy   Co-Treatment    Name: Hamlet Terrell  MRN: [id number]  Admitting Diagnosis:  Acute on chronic combined systolic and diastolic heart failure       Recommendations:     Discharge Recommendations: nursing facility, skilled  Discharge Equipment Recommendations:  (tbd)  Barriers to discharge:  None    Assessment:     Hamlet Terrell is a 53 y.o. male with a medical diagnosis of Acute on chronic combined systolic and diastolic heart failure.  He presents with impairments listed below. Pt presents w/ self-limiting behavior throughout session. Pt currently requires increased assist for ADLs and mobility at this time.  Pt displayed global deconditioning requiring increased assist for ADLs and mobility at this time. Pt would benefit from skilled OT services to improve independence and overall occupational functioning.     Performance deficits affecting function are gait instability, impaired endurance, weakness, impaired self care skills, impaired functional mobilty, impaired balance, decreased upper extremity function, decreased lower extremity function, decreased safety awareness.     Rehab Prognosis:  Good; patient would benefit from acute skilled OT services to address these deficits and reach maximum level of function.       Plan:     Patient to be seen 3 x/week to address the above listed problems via self-care/home management, therapeutic activities, therapeutic exercises  · Plan of Care Expires: 08/07/19  · Plan of Care Reviewed with: patient    Subjective     Pain/Comfort:  · Pain Rating 1: (did not rate)  · Location - Side 1: Right  · Location - Orientation 1: generalized  · Location 1: shoulder  · Pain Addressed 1: Distraction, Nurse notified  · Pain Rating Post-Intervention 1: (did not rate)  · Pain Rating 2: (did not rate)  · Location - Side 2: Right  · Location - Orientation 2: generalized  · Location 2: leg  · Pain Addressed 2: Reposition, Distraction, Cessation of Activity  · Pain Rating  Post-Intervention 2: (did not rate)    Objective:     Communicated with: RN prior to session.  Patient found up in chair with telemetry, peripheral IV upon OT entry to room.    General Precautions: Standard, fall   Orthopedic Precautions:N/A   Braces: N/A       Pt c/o of RLE and RUE pain at beginning of session. Pt stated he could not move his RUE and RLE. When OT was setting up room, pt noted to be using RUE to place telemetry in gown pocket and no noted pain signs or symptoms. No c/o pain in RLE when shifting and bearing full weight on RLE.      Occupational Performance:     Functional Mobility/Transfers:  · Patient completed Sit <> Stand Transfer with moderate assistance  with  rolling walker and and performed twice   · Functional Mobility: Pt ambulated ~3 ft w/ RW and cga w/ total assist to advance RLE.     Activities of Daily Living:  · Grooming: contact guard assistance oral hygiene in standing  · Lower Body Dressing: total assistance donned socks on feet      AMPA 6 Click ADL: 16    Treatment & Education:  Pt educated on POC.   Pt performed static stand at cga w/o AD.     Patient left up in chair with all lines intact and call button in reachEducation:      GOALS:   Multidisciplinary Problems     Occupational Therapy Goals        Problem: Occupational Therapy Goal    Goal Priority Disciplines Outcome Interventions   Occupational Therapy Goal     OT, PT/OT Ongoing (interventions implemented as appropriate)    Description:  Goals to be met by: 7/15/19     Patient will increase functional independence with ADLs by performing:    UE Dressing with Moderate Assistance.  LE Dressing with Maximum Assistance.  Grooming while seated with Minimal Assistance.  Toileting from bedside commode with Maximum Assistance for hygiene and clothing management.   Rolling to Bilateral with Maximum Assistance.   Supine to sit with Maximum Assistance.  Stand pivot transfers with Maximum Assistance.  Step transfer with Maximum  Assistance  Toilet transfer to bedside commode with Maximum Assistance.                      Time Tracking:     OT Date of Treatment: 07/08/19  OT Start Time: 1104  OT Stop Time: 1129  OT Total Time (min): 25 min    Billable Minutes:Self Care/Home Management 10 minutes  Therapeutic Activity 15 minutes    Ceferino Jefferson, OT  7/8/2019

## 2019-07-08 NOTE — PROGRESS NOTES
[facility]  Cardiology  Progress Note    Patient Name: Hamlet Terrell  MRN: [id number]  Admission Date: 7/2/2019  Hospital Length of Stay: 4 days  Code Status: Full Code   Attending Physician: José Luis Langford MD   Primary Care Physician: Carlin Swift MD  Expected Discharge Date: 7/9/2019  Principal Problem:Acute on chronic combined systolic and diastolic heart failure    Subjective:     Hospital Course:   No notes on file    Interval History: Feeling well this AM, though orthopnea persists. Denies chest pain, palpitations; had reported NSVT overnight though none appreciated on tele.    Review of Systems   Constitution: Negative. Negative for chills and fever.   HENT: Negative.    Eyes: Negative.    Cardiovascular: Positive for orthopnea. Negative for chest pain, claudication and paroxysmal nocturnal dyspnea.   Respiratory: Negative for cough, shortness of breath and wheezing.    Endocrine: Negative.    Hematologic/Lymphatic: Does not bruise/bleed easily.   Skin: Negative for nail changes and rash.   Musculoskeletal: Negative.  Negative for back pain.   Gastrointestinal: Negative for abdominal pain, melena, nausea and vomiting.   Neurological: Negative for dizziness and headaches.   Psychiatric/Behavioral: Negative for altered mental status, depression and substance abuse.   Allergic/Immunologic: Negative.      Objective:     Vital Signs (Most Recent):  Temp: 98.5 °F (36.9 °C) (07/08/19 1201)  Pulse: 86 (07/08/19 1201)  Resp: 18 (07/08/19 1201)  BP: (!) 130/53 (07/08/19 1201)  SpO2: (!) 93 % (07/08/19 1201) Vital Signs (24h Range):  Temp:  [97.8 °F (36.6 °C)-98.8 °F (37.1 °C)] 98.5 °F (36.9 °C)  Pulse:  [] 86  Resp:  [16-18] 18  SpO2:  [91 %-100 %] 93 %  BP: (101-130)/(53-86) 130/53     Weight: (patient refuse nurse was told)  Body mass index is 38.54 kg/m².     SpO2: (!) 93 %  O2 Device (Oxygen Therapy): room air      Intake/Output Summary (Last 24 hours) at 7/8/2019 1307  Last data filed  at 7/8/2019 0914  Gross per 24 hour   Intake 1200 ml   Output 1975 ml   Net -775 ml       Lines/Drains/Airways     Peripheral Intravenous Line                 Peripheral IV - Single Lumen 07/05/19 1545 20 G Left Hand 2 days                Physical Exam   Constitutional: He is oriented to person, place, and time. He appears well-developed and well-nourished.   HENT:   Head: Normocephalic and atraumatic.   Eyes: Pupils are equal, round, and reactive to light. Conjunctivae and EOM are normal.   Neck: JVD present.   Cardiovascular: Normal rate, regular rhythm, normal heart sounds and intact distal pulses. Exam reveals no gallop and no friction rub.   No murmur heard.  Pulmonary/Chest: Effort normal and breath sounds normal. No stridor. He has no wheezes. He has no rales. He exhibits no tenderness.   Abdominal: Soft. Bowel sounds are normal. He exhibits no distension and no mass. There is no tenderness. There is no guarding.   Musculoskeletal: He exhibits no edema, tenderness or deformity.   Neurological: He is alert and oriented to person, place, and time.   Skin: Skin is warm and dry. No rash noted. No erythema.   Chronic lymphedema noted   Psychiatric: He has a normal mood and affect.       Significant Labs:    Lab Results   Component Value Date    WBC 6.99 07/08/2019    HGB 7.5 (L) 07/08/2019    HCT 24.5 (L) 07/08/2019    MCV 77 (L) 07/08/2019     07/08/2019     BMP  Lab Results   Component Value Date     (L) 07/08/2019    K 3.5 07/08/2019    CL 90 (L) 07/08/2019    CO2 32 (H) 07/08/2019    BUN 55 (H) 07/08/2019    CREATININE 2.0 (H) 07/08/2019    CALCIUM 10.3 07/08/2019    ANIONGAP 13 07/08/2019    ESTGFRAFRICA 43.1 (A) 07/08/2019    EGFRNONAA 37.3 (A) 07/08/2019       Significant Imaging: reviewed in Epic    Assessment and Plan:       * Acute on chronic combined systolic and diastolic heart failure  ADHF in the setting of dietary, fluid and medication noncompliance   - Profile B, NYHA III,      - NICM,  negative PET stress    - Most recent TTE demonstrates: LVef 23%,    - Pro-   - though labs suggestive of volume depletion, patient continues to endorse orthopnea with elevated JVD on exam    - Continue Lasix gtt @ 10; will order CXR and BNP for tmrw AM  - Continue Toprol 25mg qday, Bidil 37.5-20 bid , ASA 81mg  - Will consider Aldactone / ARB / ARNI once renal function stable and euvolemic   - Need to follow up with EP outpatient for ICD consideration   - Need to be evaluated by HTS outpatient if able to comply with medical management  - Fluid restriction at 1500 cc with strict I/Os and daily STANDING weights  - Maintain on telemetry  - repeat TTE declined by patient and not indicated for dCHF on this admission, will cancel for now  - Check Electrolytes, keep Mag >2 & K >4  - SCDs, TEDs, Nursing communication to elevated LE   - Ambulate as tolerated       Atrial fibrillation, chronic  Permanent Afib:   - CHADS-VASC 3  - History of CVA 2009 and presumed TIA on this admission, would continue bridging pending therapeutic warfarin  - Continue Lovenox 1mg/kg BID with Coumadin, goal INR 2-3  - Continue home Toprol 25mg qday; will consider increasing tomorrow if feel patient euvolemic, compensating appropriately  - Maintain K > 4, Mag > 2 and Ca/iCal WNL to decrease arrhythmogenic potential  - Maintain on telemetry        VTE Risk Mitigation (From admission, onward)        Ordered     warfarin (COUMADIN) tablet 10 mg  Daily      07/06/19 0949     enoxaparin injection 120 mg  Every 12 hours (non-standard times)      07/05/19 2210     IP VTE HIGH RISK PATIENT  Once      07/03/19 0955     Place sequential compression device  Until discontinued      07/03/19 0955     Place CAMI hose  Until discontinued      07/03/19 0841          Checo Oneill MD  Cardiology  [facility]

## 2019-07-08 NOTE — PROGRESS NOTES
PharmD Consult received for: Warfarin dosing       Hamlet Terrell is a 53 y.o. male on warfarin therapy for Atrial Fibrillation. PharmD has been consulted for warfarin dosing.     Current order: 10 mg QD   Home dose: 6 mg QD   Coumadin clinic enrollment: Active  INR goal: 2-3  Lab Results   Component Value Date    INR 1.2 07/08/2019    INR 1.2 07/07/2019    INR 1.1 07/07/2019     Recommendation(s):      Continue warfarin 10 mg QD and bridge with Lovenox 120 mg BID until INR therapeutic >2  Hg-7.5; Plt-315 stable  Pharmacy will continue to follow up     Thank you for the consult,  Catalina Rowe  [phone]      **Note: Consults are reviewed Monday-Friday 7:00am-3:30pm. THE ABOVE RECOMMENDATIONS ARE ONLY SUGGESTED.THE RECOMMENDATIONS SHOULD BE CONSIDERED IN CONJUNCTION WITH ALL PATIENT FACTORS. **

## 2019-07-09 PROBLEM — R50.9 FEBRILE ILLNESS, ACUTE: Status: RESOLVED | Noted: 2019-07-04 | Resolved: 2019-07-09

## 2019-07-09 PROBLEM — R13.10 DYSPHAGIA, IDIOPATHIC: Status: ACTIVE | Noted: 2019-07-09

## 2019-07-09 LAB
ALBUMIN SERPL BCP-MCNC: 3.4 G/DL (ref 3.5–5.2)
ALP SERPL-CCNC: 204 U/L (ref 55–135)
ALT SERPL W/O P-5'-P-CCNC: 8 U/L (ref 10–44)
ANION GAP SERPL CALC-SCNC: 15 MMOL/L (ref 8–16)
AST SERPL-CCNC: 23 U/L (ref 10–40)
BACTERIA BLD CULT: NORMAL
BACTERIA BLD CULT: NORMAL
BILIRUB SERPL-MCNC: 0.9 MG/DL (ref 0.1–1)
BNP SERPL-MCNC: 331 PG/ML (ref 0–99)
BUN SERPL-MCNC: 60 MG/DL (ref 6–20)
CALCIUM SERPL-MCNC: 10 MG/DL (ref 8.7–10.5)
CHLORIDE SERPL-SCNC: 89 MMOL/L (ref 95–110)
CO2 SERPL-SCNC: 31 MMOL/L (ref 23–29)
CREAT SERPL-MCNC: 2.5 MG/DL (ref 0.5–1.4)
EST. GFR  (AFRICAN AMERICAN): 32.9 ML/MIN/1.73 M^2
EST. GFR  (NON AFRICAN AMERICAN): 28.5 ML/MIN/1.73 M^2
GLUCOSE SERPL-MCNC: 113 MG/DL (ref 70–110)
INR PPP: 1.5 (ref 0.8–1.2)
MAGNESIUM SERPL-MCNC: 2.2 MG/DL (ref 1.6–2.6)
MYOGLOBIN SERPL-MCNC: 114 NG/ML (ref 28–72)
POTASSIUM SERPL-SCNC: 4.2 MMOL/L (ref 3.5–5.1)
PROT SERPL-MCNC: 9.3 G/DL (ref 6–8.4)
PROTHROMBIN TIME: 14.8 SEC (ref 9–12.5)
PTH-INTACT SERPL-MCNC: 328 PG/ML (ref 9–77)
PYRIDOXAL SERPL-MCNC: 2 UG/L (ref 5–50)
SODIUM SERPL-SCNC: 135 MMOL/L (ref 136–145)
VIT B1 BLD-MCNC: 43 UG/L (ref 38–122)

## 2019-07-09 PROCEDURE — 83735 ASSAY OF MAGNESIUM: CPT

## 2019-07-09 PROCEDURE — 99232 SBSQ HOSP IP/OBS MODERATE 35: CPT | Mod: ,,, | Performed by: INTERNAL MEDICINE

## 2019-07-09 PROCEDURE — 25000003 PHARM REV CODE 250: Performed by: HOSPITALIST

## 2019-07-09 PROCEDURE — 97110 THERAPEUTIC EXERCISES: CPT

## 2019-07-09 PROCEDURE — 63600175 PHARM REV CODE 636 W HCPCS: Performed by: HOSPITALIST

## 2019-07-09 PROCEDURE — 85610 PROTHROMBIN TIME: CPT

## 2019-07-09 PROCEDURE — 83970 ASSAY OF PARATHORMONE: CPT

## 2019-07-09 PROCEDURE — 99232 PR SUBSEQUENT HOSPITAL CARE,LEVL II: ICD-10-PCS | Mod: ,,, | Performed by: INTERNAL MEDICINE

## 2019-07-09 PROCEDURE — 25000003 PHARM REV CODE 250: Performed by: STUDENT IN AN ORGANIZED HEALTH CARE EDUCATION/TRAINING PROGRAM

## 2019-07-09 PROCEDURE — 25000003 PHARM REV CODE 250: Performed by: INTERNAL MEDICINE

## 2019-07-09 PROCEDURE — 83880 ASSAY OF NATRIURETIC PEPTIDE: CPT

## 2019-07-09 PROCEDURE — 80053 COMPREHEN METABOLIC PANEL: CPT

## 2019-07-09 PROCEDURE — 25000003 PHARM REV CODE 250: Performed by: PHYSICIAN ASSISTANT

## 2019-07-09 PROCEDURE — 20600001 HC STEP DOWN PRIVATE ROOM

## 2019-07-09 PROCEDURE — 36415 COLL VENOUS BLD VENIPUNCTURE: CPT

## 2019-07-09 RX ORDER — FUROSEMIDE 80 MG/1
80 TABLET ORAL 2 TIMES DAILY
Status: DISCONTINUED | OUTPATIENT
Start: 2019-07-09 | End: 2019-07-10

## 2019-07-09 RX ORDER — METHOCARBAMOL 500 MG/1
500 TABLET, FILM COATED ORAL
Status: DISCONTINUED | OUTPATIENT
Start: 2019-07-09 | End: 2019-07-18 | Stop reason: HOSPADM

## 2019-07-09 RX ADMIN — COLCHICINE 0.6 MG: 0.6 TABLET, FILM COATED ORAL at 09:07

## 2019-07-09 RX ADMIN — METOPROLOL TARTRATE 25 MG: 25 TABLET, FILM COATED ORAL at 12:07

## 2019-07-09 RX ADMIN — POLYETHYLENE GLYCOL 3350 17 G: 17 POWDER, FOR SOLUTION ORAL at 09:07

## 2019-07-09 RX ADMIN — ASPIRIN 81 MG: 81 TABLET, COATED ORAL at 09:07

## 2019-07-09 RX ADMIN — GABAPENTIN 300 MG: 250 SOLUTION ORAL at 09:07

## 2019-07-09 RX ADMIN — ERGOCALCIFEROL 50000 UNITS: 1.25 CAPSULE ORAL at 09:07

## 2019-07-09 RX ADMIN — PANTOPRAZOLE SODIUM 40 MG: 40 TABLET, DELAYED RELEASE ORAL at 09:07

## 2019-07-09 RX ADMIN — ENOXAPARIN SODIUM 120 MG: 150 INJECTION SUBCUTANEOUS at 10:07

## 2019-07-09 RX ADMIN — HYDRALAZINE HYDROCHLORIDE AND ISOSORBIDE DINITRATE 1 TABLET: 37.5; 2 TABLET, FILM COATED ORAL at 09:07

## 2019-07-09 RX ADMIN — METOPROLOL TARTRATE 25 MG: 25 TABLET, FILM COATED ORAL at 05:07

## 2019-07-09 RX ADMIN — MORPHINE SULFATE 15 MG: 15 TABLET ORAL at 10:07

## 2019-07-09 RX ADMIN — FERROUS SULFATE TAB EC 325 MG (65 MG FE EQUIVALENT) 325 MG: 325 (65 FE) TABLET DELAYED RESPONSE at 09:07

## 2019-07-09 RX ADMIN — MENTHOL, METHYL SALICYLATE: 10; 15 CREAM TOPICAL at 09:07

## 2019-07-09 RX ADMIN — WARFARIN SODIUM 10 MG: 10 TABLET ORAL at 05:07

## 2019-07-09 RX ADMIN — METHOCARBAMOL TABLETS 500 MG: 500 TABLET, COATED ORAL at 06:07

## 2019-07-09 RX ADMIN — FUROSEMIDE 10 MG/HR: 10 INJECTION, SOLUTION INTRAMUSCULAR; INTRAVENOUS at 02:07

## 2019-07-09 RX ADMIN — METOPROLOL TARTRATE 25 MG: 25 TABLET, FILM COATED ORAL at 06:07

## 2019-07-09 NOTE — PLAN OF CARE
Problem: Physical Therapy Goal  Goal: Physical Therapy Goal  Goals to be met by: 2019     Patient will increase functional independence with mobility by performin. Supine to sit with Set-up Wagoner -not met  2. Sit to supine with Set-up Wagoner -not met  3. Sit to stand transfer with Stand-by Assistance -not met  4. Bed to chair transfer with Stand-by Assistance using Rolling Walker -not met  5. Gait  x 30 feet with Contact Guard Assistance using Rolling Walker. -not met  6. Lower extremity exercise program x20 reps per handout, with assistance as needed -not met      Outcome: Ongoing (interventions implemented as appropriate)  Pt is progressing toward goals. All goals remain appropriate.

## 2019-07-09 NOTE — PROGRESS NOTES
"Hospital Medicine  Progress Note    [place] [facility] [place] [facility] [place] Oly Welsh MD  Admit Date: 7/2/2019  DELMI 7/10/2019  Length of Stay:  LOS: 5 days   Code status: Full Code    Principal Problem:  Acute on chronic combined systolic and diastolic heart failure    HPI:  54 yo M with combined CHF (Echo 4/2019 with EF 23%, diastolic dysfuction, moderated MR and TR), HTN, chronic A-fib (on Coumadin), A-flutter s/p ablation, CVA (in 2009), CAD, ?PE (patient reported), non-compliance, and drug seeking behavior presented right shoulder/arm pain following a fall injury associated with right leg weakness. Patient reports waking up to go to the bathroom during the night approximately 4 days ago when he was unable to bare weight on right leg due to weakness. He attempted to get back in the bed and aggravated/injured his right shoulder climbing back into bed. He was hoping the pain would resolve on its own but it has persisted for past 4 days prompting him to come to ED. He reports he can move his right upper extremity but refuses to because he does not want to illicit the pain."    Interval history: Patient complains of R shoulder pain and "over-sedation" from medications. Also complains of limited movement in his RLE and feet/ankles bilaterally.    Review of Systems   Constitutional: Negative for fever.   Cardiovascular: Negative for chest pain.   Musculoskeletal: Positive for joint pain.       Physical Exam  Physical Exam   Constitutional:  Non-toxic appearance. No distress.   Eyes: Conjunctivae and lids are normal.   Neck: JVD present.   Cardiovascular: S1 normal and S2 normal.   Pulmonary/Chest: Effort normal. He has rales in the right lower field and the left lower field.   Abdominal: Soft. Normal appearance and bowel sounds are normal. There is no tenderness.   Musculoskeletal: He exhibits edema.        Right shoulder: He exhibits decreased range of motion, tenderness and pain.   Neurological: He is alert. He is not " disoriented.       Temp:  [97.4 °F (36.3 °C)-98.6 °F (37 °C)]   Pulse:  []   Resp:  [16-18]   BP: (107-133)/(51-59)   SpO2:  [82 %-99 %]     Intake/Output Summary (Last 24 hours) at 7/9/2019 0947  Last data filed at 7/9/2019 0935  Gross per 24 hour   Intake 280 ml   Output 1150 ml   Net -870 ml       Recent Labs   Lab 07/06/19  0359 07/07/19  0638 07/08/19  0511   WBC 8.94 7.20 6.99   HGB 7.3* 7.1* 7.5*   HCT 24.7* 23.9* 24.5*    311 315     Recent Labs   Lab 07/08/19  0511 07/08/19  1300 07/09/19  0407   * 134* 135*   K 3.5 3.3* 4.2   CL 90* 89* 89*   CO2 32* 33* 31*   BUN 55* 56* 60*   CREATININE 2.0* 2.1* 2.5*   * 129* 113*   CALCIUM 10.3 10.1 10.0   MG 2.1 2.2 2.2     Recent Labs   Lab 07/07/19  0638 07/08/19  0511 07/09/19  0407   ALKPHOS 189* 196* 204*   ALT 9* 10 8*   AST 21 23 23   ALBUMIN 3.3* 3.4* 3.4*   PROT 8.9* 9.2* 9.3*   BILITOT 1.2* 1.1* 0.9   INR 1.2  1.1 1.2 1.5*      Recent Labs     07/09/19  0407   *     Recent Labs   Lab 07/02/19  2215   HGBA1C 6.0*       Scheduled Meds:   aspirin  81 mg Oral Daily    enoxaparin  120 mg Subcutaneous Q12H    ergocalciferol  50,000 Units Oral Q7 Days    ferrous sulfate  325 mg Oral Daily    furosemide  80 mg Oral BID    gabapentin  300 mg Oral BID    isosorbide-hydrALAZINE 20-37.5 mg  1 tablet Oral BID    lidocaine  3 patch Transdermal Q24H    methocarbamol  500 mg Oral BID PC    methyl salicylate-menthol 15-10%   Topical (Top) TID    metoprolol tartrate  25 mg Oral Q6H    pantoprazole  40 mg Oral Daily    polyethylene glycol  17 g Oral BID    senna  8.6 mg Oral Daily    warfarin  10 mg Oral Daily     Continuous Infusions:    As Needed:  albuterol, calcium carbonate, diphenhydrAMINE, morphine, nitroGLYCERIN, ondansetron, ramelteon, sodium chloride 0.9%, sodium chloride 0.9%    Active Hospital Problems    Diagnosis  POA    *Acute on chronic combined systolic and diastolic heart failure [I50.43]  Yes    Atrial  fibrillation, chronic [I48.2]  Yes     Priority: Low     Chronic    Essential hypertension [I10]  Yes     Priority: Low     Chronic    Dysphagia for pills, idiopathic [R13.10]  Yes     Patient chews pills and opens capsules -- avoid sustained-release formulations.      Shoulder pain, right [M25.511]  Yes    Weakness of lower extremity [R29.898]  Yes    Acute pain of right shoulder due to trauma [M25.511, G89.11]  Yes    Neuropathy of both feet [G57.93]  Yes    Prediabetes [R73.03]  Yes    Venous stasis of lower extremity [I87.8]  Yes    Elevated alkaline phosphatase level [R74.8]  Yes    JEAN-PIERRE (acute kidney injury) [N17.9]  Yes    Chronic kidney disease [N18.9]  Yes     Chronic    Lumbar back pain [M54.5]  Yes    Personal history of venous thrombosis and embolism [Z86.718]  Not Applicable    Chronic anticoagulation [Z79.01]  Not Applicable     Chronic    Non compliance w medication regimen [Z91.14]  Not Applicable     Chronic    History of CVA (cerebrovascular accident) [Z86.73]  Not Applicable     Personal account, occurred in 2008/2009 at [facility].        Resolved Hospital Problems    Diagnosis Date Resolved POA    Febrile illness, acute [R50.9] 07/09/2019 Yes       Overview of Hospital Course:  52 yo M with combined CHF (Echo 4/2019 with EF 23%, diastolic dysfuction, moderated MR and TR), HTN, chronic A-fib (on Coumadin), A-flutter s/p ablation, CVA (in 2009), CAD, ?PE (patient reported), non-compliance presenting with musculoskeletal complaints and weakness admitted for acute on chronic combined heart failure.    Assessment and Plan:    Acute on Chronic Combined systolic/diastolic heart failure   -Cardiology consulted, appreciate assistance  -continued Lasix infusion until 7/8  -Repeat ECHO ordered; cancelled by Cardiology  -Converting diuresis to home oral dose 7/9 as per Cardiology's recommendations.     Right sided weakness & acute pain   -IV opiates discontinued, on oral morphine PRN,  changed to liquid due to history of chewing pills.  -Gabapentin renal-dose ordered, initially refusing but now taking, titrate dose as tolerated.    -etiology for pian and weakness remains unclear, s/p MRI Brain/C/T/L spine  -Patient has chronic elevation of Uric Acid, prior synovial fluid studies negative for gout/pseudogout crystals > trial of empiric Colchicine 0.6mg X 1 did not improve symptoms.   -s/p Right shoulder arthrocentesis - gram stain negative, culture Aerobic with No Growth  -Neurology consulted - evaluated, concern for non-pathologic pattern/etiology of pain and weakness    -Lack of participation in medical care/testing is limiting clinical evaluations.   -Continue OT/PT; plan for SNF     Febrile Illness - resolved  -febrile a few hours after admission on 7/3 - 7/4 . Afebrile since.   -Procal elevated, blood cultures negative  -initial UA w/o signs of infection  -CXR w/o signs of new infiltrate  -Empiric Vanc & Zosyn discontinued  -synovial fluid cultures show no growth so far     Atrial Fibrillation   -Beta-blocker changed to Lopressor 25mg q6hrs.   -Therapeutic Lovenox with INR low  -warfarin at 10mg dosing, PharmD consulted..   -Avoiding sustained-release BB (Toprol) due to patient's habit of chewing pills.     JEAN-PIERRE on CKD   -Likely cardiorenal.  -bladder scan, if retaining will need Hughes catheter  -Suspect cardiorenal syndrome. Initial Urine lytes both FeNa 0.4% and FeUrea 14% represent Pre-renal etiology in this case. Continued diuresis.      Hx of VTE  -Coumadin 10mg   -subtherapeutic INR  -continue Therapeutic Lovenox      Anemia of iron deficiency  -normal ferritin but low serum iron and saturation   -Pt declines blood product transfusion, refusal signed  -repeat iron studies/retic - retic count low, serum and saturated iron very low. Oral iron supplementation started.      High Risk Conditions  Patient is currently on drug therapy requiring intensive monitoring for toxicity: Warfarin      Diet:  Diet Cardiac [facility]; Fluid - 1500mL  GI Prophylaxis: Continued, chronic acid suppression therapy as OP  DVT Prophylaxis:     Anticoagulants   Medication Route Frequency    enoxaparin injection 120 mg Subcutaneous Q12H    warfarin (COUMADIN) tablet 10 mg Oral Daily     Lines/ Drains/ Airways:  PIV  Urinary Catheter Indicated: Patient Does Not Have Urinary Catheter  Other Lines/Tubes/Drains:  Wounds:  Venous stasis ulcers    Goals of Care: Routine interventions  Discharge plan:  Skilled Nursing Facility    Oly Welsh MD  Department of Hospital Medicine  [id number]

## 2019-07-09 NOTE — PT/OT/SLP PROGRESS
"Physical Therapy Treatment    Patient Name:  Hamlet Terrell   MRN:  [id number]    Recommendations:     Discharge Recommendations:  nursing facility, skilled   Discharge Equipment Recommendations: walker, rolling(TBD)   Barriers to discharge: Decreased caregiver support    Assessment:     Hamlet Terrell is a 53 y.o. male admitted with a medical diagnosis of Acute on chronic combined systolic and diastolic heart failure.  He presents with the following impairments/functional limitations:  weakness, impaired functional mobilty, gait instability, impaired balance, impaired endurance, decreased lower extremity function, decreased upper extremity function, pain, decreased safety awareness. Pt demo'd ability to stand and bear full weight through R LE and able to perform LAQ against gravity with R LE. Pt with report of symptoms inconsistent with presentation. Pt reports no functional movement of R LE, however, demo's selective control and contraction of muscles during functional movements. Upon discharge, pt would benefit from continued skilled therapy intervention at skilled nursing facility to progress toward more independent mobility. At this time, pt would continue to benefit from acute skilled therapy intervention to address deficits and progress toward prior level of function.       Rehab Prognosis: Good; patient would benefit from acute skilled PT services to address these deficits and reach maximum level of function.    Recent Surgery: * No surgery found *      Plan:     During this hospitalization, patient to be seen 4 x/week to address the identified rehab impairments via gait training, therapeutic activities, therapeutic exercises, neuromuscular re-education and progress toward the following goals:    · Plan of Care Expires:  08/03/19    Subjective     Chief Complaint: Pt states "I don't know how to do what you want me to do if I can't move my leg"   Patient/Family Comments/goals: to get better and return home "   Pain/Comfort:  · Pain Rating 1: 9/10  · Location - Side 1: Right  · Location - Orientation 1: generalized  · Location 1: shoulder  · Pain Addressed 1: Distraction, Nurse notified, Pre-medicate for activity      Objective:     Communicated with RN prior to session.  Patient found up in chair with telemetry, peripheral IV upon PT entry to room.     General Precautions: Standard, fall   Orthopedic Precautions:N/A   Braces: N/A     Functional Mobility:  · Transfers:     · Sit to Stand:  moderate assistance with rolling walker with increased time to perform   · Stand to sit: minimum assistance with increased time to perform descent       AM-PAC 6 CLICK MOBILITY  Turning over in bed (including adjusting bedclothes, sheets and blankets)?: 3  Sitting down on and standing up from a chair with arms (e.g., wheelchair, bedside commode, etc.): 2  Moving from lying on back to sitting on the side of the bed?: 2  Moving to and from a bed to a chair (including a wheelchair)?: 2  Need to walk in hospital room?: 2  Climbing 3-5 steps with a railing?: 1  Basic Mobility Total Score: 12       Therapeutic Activities and Exercises:   Pt educated on role of PT/POC. Pt verbalized understanding.   Pt demo'd slight active knee flexion of R LE while preparing to stand. Pt demo'd guarding of R UE, refuses to perform shoulder flexion or abduction. Pt performed sit <> stand with L UE support and moderate assistance. Pt able to maintain standing for 4 mins with B UE support on RW and CGA. Pt performed 10x bilateral weight shift. Pt then performed 5x shift to full weight bearing onto R LE with light blocking at R knee with slight lifting of L LE off of floor. Pt with no buckling or instability of R LE. Pt then performed 5x shift to full weight bearing onto L LE with facilitation to bend R knee, then return to extension with transition to bilateral weight bearing. Pt continued to demo control of R knee throughout exercise.   Following return to  sitting in chair, pt performed 10x L LAQ, then performed 10x bilateral LAQ, initially with total assistance for LAQ to R LE. Pt encouraged to attempt to engage R quad. Pt demo'd increasing activation of R quad until pt able to perform final 2 trials of LAQ to terminal knee extension against gravity without assistance. Pt encouraged to continue performing LAQ and knee flexion while sitting up in chair.   Pt encouraged to only perform OOB mobility with assistance from nursing/therapy. Pt agreeable.        Patient left up in chair with all lines intact, call button in reach and RN notified..    GOALS:   Multidisciplinary Problems     Physical Therapy Goals        Problem: Physical Therapy Goal    Goal Priority Disciplines Outcome Goal Variances Interventions   Physical Therapy Goal     PT, PT/OT Ongoing (interventions implemented as appropriate)     Description:  Goals to be met by: 2019     Patient will increase functional independence with mobility by performin. Supine to sit with Set-up [place] -not met  2. Sit to supine with Set-up [place] -not met  3. Sit to stand transfer with Stand-by Assistance -not met  4. Bed to chair transfer with Stand-by Assistance using Rolling Walker -not met  5. Gait  x 30 feet with Contact Guard Assistance using Rolling Walker. -not met  6. Lower extremity exercise program x20 reps per handout, with assistance as needed -not met                       Time Tracking:     PT Received On: 19  PT Start Time: 1056     PT Stop Time: 1120  PT Total Time (min): 24 min     Billable Minutes: Therapeutic Exercise 24 mins     Treatment Type: Treatment  PT/PTA: PT     PTA Visit Number: 0     Mary Ellen Debbie, PT  2019

## 2019-07-09 NOTE — PROGRESS NOTES
[facility] Hospital Medicine  Progress Note    Patient Name: Hamlet Terrell  MRN: [id number]  Patient Class: IP- Inpatient   Admission Date: 7/2/2019  Length of Stay: 4 days  Attending Physician: José Luis Langford MD  Primary Care Provider: Carlin Swift MD    [facility] Medicine Team: [facility]OSP MED A José Luis Langford MD    Subjective:     Principal Problem:Acute on chronic combined systolic and diastolic heart failure    HPI:   52 y/o M with PMH combined CHF (last Echo 4/2019 with EF 23%, diastolic dysfuction, moderated MR and TR), HTN, chronic A. fib (on coumadin), A. flutter s/p ablation, CVA (in 2009), CAD, ?PE (patient reported), non-compliance, and drug seeking behavior presents c/o of right shoulder/arm pain following an injury from right leg weakness. Patient reports waking up to go to the restroom during the night approximately 4 days ago when he was unable to bare weight on right leg due to weakness. He attempted to get back in the bed and aggravated/injured his right shoulder climbing back into bed. He was hoping the pain would resolve on its own but it has persisted for past 4 days prompting him to come to ED. He reports he can move his right upper extremity but refuses to because he does not want to illicit the pain.  Pt denies fever, chills, appetite change, wt change, CP, SOB (at baseline, 2 pillow orthopnea), palpitations, increased leg swelling (chronic leg swelling and venous stasis changes), N/V/D, confusion, slurred speech, dysphagia, seizures, tremors, syncope.     Through chart review, patient seen in ED for various pain complaints in the past.      On 4/6/17 per Dr. Garza's note: chronic LE heel/ankle pain that initially was thought secondary to LE swelling, and possibly gout but as swelling decreased and no improvement with colchicine.  Pt initially receiving opiates, but this was titrated down and patient was started on Gabapentin, at 400mg BID.  MRI revealed no  structural abnormality, there was concern for possible left heel bursitis.  However pain appears out of proportion to exam and findings. Would not recommend any chronic opiate treatment for this pain.      Per discharge note on 4/24/19, patient did ask for a pain medication prescription on discharge, which he does not have an indication for.       On last hospital admission (5/14/19 - 5/17/19): Mr. Terrell's hospitalization was complicated by aggressive behavior and agitation, including threatening remarks made against provider. Patient complained of chest pain, workup for ACS was negative, and the pain improved with GI medications, which is consistent with the etiology of either hearburn/GERD or esophagitis. He was recommended to utilize a PPI and either Maalox or carafate, but on discussing this issue, he continued to escalate his behavior and consistently request narcotic pain medication. Patient threatening to return to ED if discharged. Security was present during discharge counseling session due to the threats.      In 2018, patient prescribed [place] by 4 different prescribers at various times. Last opioid prescription written in May 2019 by an ER physician.     In the ED: Afebrile without leukocytosis on arrival. Hypertensive at 140s-150s/80s. Hgb 8.7 (baseline). ESR elevated >120, CRP 25.9. INR non-therapeutic (1.2). EKG with A. Fib. Alk Phos 236, bili 1.8. Tox screen positive for benzos and opiates. UA unremarkable. CT head without acute intracranial abnormalities. R shoulder XR with no acute fracture. MRI brain with chronic infarcts. Cervical, lumbar, and thoracic spine MRI limited due to artifact but showed chronic changes and C5-6 level demonstrates appearance of disc osteophyte disease mild anterior impression upon the dural sac without high-grade stenosis.  Foraminal evaluation is limited, there is suggestion of foraminal encroachment on the right.  Correlation for exiting nerve root symptomatology is  "needed.     Hospital Course:   MSK/Neuro complaints  Patient completed MRI Brain and MRI entire spine that did not reveal acute finding to explain patient's unilateral symptoms.  Pt does have some abnormalities on spinal MRI as per report and HPI.     On hospital day 2, patient had a fever overnight, had same ongoing shoulder pain and reported inability to move right leg. Orthopedic surgery consulted to eval right shoulder for possible septic joint, s/p arthrocentesis, not enough fluid for cell count but fluid cultures pending, gram stain negative for organism.  Empiric Vanc & Zosyn started for one day.   MRI shoulder ordered but patient unable to tolerate on multiple attempts, and despite use of Versed 1mg pre-medication.     Neurology consulted, but on HD3, patient refused to comply with examination. On repeat Neurology evaluation, felt exam and findings were non-pathologic.  Of note Neurology notes Arceo's sign positive - "Arceo's sign positive on exam, pattern and history highly-concerning for a non-neurologic/anatomic etiology" Neurology signed off.     CHF  Patient on exam is notable for diffuse 4+ pitting edema below the knee and dependent areas of legs, concern for decompensated heart failure, his Creatinine was rising through initial hospitalization, and patient reported lack of diuretic response to 160mg IV lasix on admission.  In the past for his CHF exacerbations doses of 80mg IV lasix used.   Due to lack of diuretic response increased low grade Afib with RVR.  New ECho ordered. Tried on lasix drip and Dobutamine (6hrs) loss of IV access terminated therapy and ultimately cardiology consulted.     Due to interruptions in IV access and patient refusal at attempts, his Cr priti to 1.9.  Eventually started on bolus dose diuretics with intermittent metolazone, Cardiology recommending stopping dobutamine and switching to IV lasix infusion on 7/6.     Behavioral  Per HPI summary, during past admissions " "concerns raised by medical teams over pain seeking behavior, and also concerns in past over verbally abusive behavior toward medical staff.   This admission after IV access lost patient refused to allow overnight staff to attempt, refused PICC team to attempt, refused ECHO test numerous times on multiple days.  Patient would report not wanting to move into bed would want to stay in chair and stated he needed sleep.  On repeat discussions with other medical staff present pt would say he will participate and accept diagnostic test, but when checking in with nursing would hear of recurrent behaviors of refusing nursing assessments, refusing diagnostic tests.   Multiple attempts at PT/OT evals patient has declined.  When questioned patient will ask "when did that happen" or tell me he'll comply next time.   -Continued procedure, lab/medication refusals when initially ordered, will note to physician that he will comply to therapies but per nursing staff and medical staff notes discussion, patient with different attitude and interactions with them.     Interval History:     Continues on lasix drip   BUN and mild Cr rising   Cardiology recs switch to lopressor 25mg q6hr for management of afib  Repeat CXR and BNP  Echo remains incompleted      Pt continues to report no overall improvement in symptoms.   SNF recommended per PT/ot      Review of Systems   Constitutional: Negative for chills, diaphoresis and fever.   HENT: Negative for sore throat.    Eyes: Negative for visual disturbance.   Respiratory: Negative for shortness of breath.    Cardiovascular: Positive for leg swelling. Negative for chest pain and palpitations.   Gastrointestinal: Negative for abdominal pain, constipation and diarrhea.   Skin: Negative for color change.     Objective:     Vital Signs (Most Recent):  Temp: 98.6 °F (37 °C) (07/08/19 2053)  Pulse: 85 (07/08/19 2053)  Resp: 16 (07/08/19 2053)  BP: (!) 109/59 (07/08/19 2053)  SpO2: 96 % (07/08/19 2053) " Vital Signs (24h Range):  Temp:  [97.4 °F (36.3 °C)-98.8 °F (37.1 °C)] 98.6 °F (37 °C)  Pulse:  [] 85  Resp:  [16-18] 16  SpO2:  [91 %-96 %] 96 %  BP: (109-130)/(51-86) 109/59     Weight: 114.9 kg (253 lb 4.9 oz)(Simultaneous filing. User may not have seen previous data.)  Body mass index is 37.41 kg/m².    Intake/Output Summary (Last 24 hours) at 7/8/2019 2112  Last data filed at 7/8/2019 1715  Gross per 24 hour   Intake 1000 ml   Output 1450 ml   Net -450 ml      Physical Exam   HENT:   Mouth/Throat: No oropharyngeal exudate.   Eyes: Pupils are equal, round, and reactive to light. No scleral icterus.   Neck: JVD present.   JVD to mandible while sitting nearly upright   Cardiovascular:   Irregular rhythm,  4+ Pitting edema to knee and dependent portion of thighs   Pulmonary/Chest: Effort normal and breath sounds normal. No stridor. No respiratory distress. He has no wheezes.   Abdominal: Soft. Bowel sounds are normal. He exhibits no distension. There is no tenderness.   Musculoskeletal: He exhibits edema.   0/5 strength in RLE, RUE bicep/tricep flexion in discussion with pt at bedside, unable to range or tolerate passive ROM of shoulder on exam   Neurological:   Reflex Scores:       Bicep reflexes are 0 on the right side and 2+ on the left side.       Brachioradialis reflexes are 0 on the right side and 2+ on the left side.  Skin: Skin is warm and dry.       Significant Labs:   CMP:   Recent Labs   Lab 07/07/19  0638 07/08/19  0511 07/08/19  1300    135* 134*   K 3.6 3.5 3.3*   CL 93* 90* 89*   CO2 29 32* 33*   GLU 91 125* 129*   BUN 49* 55* 56*   CREATININE 1.9* 2.0* 2.1*   CALCIUM 9.9 10.3 10.1   PROT 8.9* 9.2*  --    ALBUMIN 3.3* 3.4*  --    BILITOT 1.2* 1.1*  --    ALKPHOS 189* 196*  --    AST 21 23  --    ALT 9* 10  --    ANIONGAP 14 13 12   EGFRNONAA 39.7* 37.3* 35.1*     Cardiac Markers:   No results for input(s): CKMB, MYOGLOBIN, BNP, TROPISTAT in the last 48 hours.  Lactic Acid:   No results  for input(s): LACTATE in the last 48 hours.  Results for DIANN BARRERA (MRN [id number]) as of 7/4/2019 11:09   Ref. Range 7/4/2019 04:29   Uric Acid Latest Ref Range: 3.4 - 7.0 mg/dL 12.1 (H)     Results for DIANN BARRERA (MRN [id number]) as of 7/4/2019 11:09   Ref. Range 7/2/2019 22:15   Sed Rate Latest Ref Range: 0 - 23 mm/Hr >120 (H)       Significant Imaging: I have reviewed all pertinent imaging results/findings within the past 24 hours.    MRI Shoulder Non-COntrast  No evidence for complete cuff tear though assessment for underlying partial tear is limited given incomplete study.    Questionable linear edema interposed between the posterior cuff and deltoid musculature.  Repeat assessment would be helpful to evaluate for muscular strain.    Additional limited findings as above.    Electronically signed by resident: Abraham Tovar  Date: 07/06/2019    Assessment/Plan:      Acute on Chronic Combined systolic/diastolic heart failure   -continue lasix infusion   -Repeat ECHO ordered pending  -cardiology consults following, appreciate assistance      Right sided weakness & acute pain   -Stopped IV opiates, on morphine PRN, will have to wean this medication  -Gabapentin renal dose ordered, initially refusing but now taking x 48hrs, titrate dose as pt tolerates.     -etiology remains unclear, s/p MRI Brain/C/T/L spine  -Patient has chronic elevation of Uric Acid, prior synovial fluid studies negative for gout/pseudogout crystals    >Consider giving empiric Steroid course or Colchicine 0.6mg daily  for gout to see if any improvement in symptoms.   -s/p Right shoulder arthrocentesis - gram stain negative, culture Aerobic - No Growth  -Neurology consult - evaluated, concern for non-pathologic pattern/etiology of pain and weakness    -Lack of participation in medical care/testing is limiting clinical evaluations.   -Concern possible conversion d/o, functional d/o vs pain seeking   >pt c/o of intolerance to exam with  light touch to areas of right sided extremities, but can tolerate getting to the bathroom (unwitnessed)  RN has seen patient raise up out of chair using both arms which would require shoulder extension   >Suspect psychiatry consult would not be of use inpatient due to lack of participation in care and evaluation would be incomplete w/o ability to make a diagnosis.     Febrile ILlness  -resolved  -febrile on 7/4 @ 0500. Afebrile since.   -procal elevated, blood cultures and remain NGTD  -initial UA w/o signs of infection  -CXR w/o signs of new infiltrate  -Stopping Vanc & Zosyn   -synovial fluid shows no growth so far  -no repeat fevers    Atrial Fibrillation   -Switch to Lopressor 25mg q6hrs.   -on therapeutic lovenox  -warfarin at 10mg dosing, pharmD following.   telemetry    JEAN-PIERRE on CKD   -see heart failure  -Urine studies ordered and pending  -bladder scan, if retaining will need monroe catheter  -may also need monroe   -Suspect cardiorenal syndrome. Delayed and inadequate diuresis to this point. Initial Urine lytes both FeNa 0.4% and FeUrea 14% represent Pre-renal etiology in this case. Continue diuresis.     Hx of VTE  -Coumadin @10mg   -subtherapeutic  -continue Therapeutic Lovenox given high CHADS-VASC score  -patient has refused doses of lovenox intermittently     Anemia of iron deficiency  -normal ferritin but low serum iron and saturation on April studies  -Pt declines blood product transfusion, refusal signed  -repeat iron studies/retic - retic count low, serum and saturated iron very low.  Start oral iron  -discussed IV iron or EPO, patient reported undecided on treatments.     Active Diagnoses:    Diagnosis Date Noted POA    PRINCIPAL PROBLEM:  Acute on chronic combined systolic and diastolic heart failure [I50.43] 11/18/2013 Yes    Febrile illness, acute [R50.9] 07/04/2019 No    Acute pain of right shoulder due to trauma [M25.511, G89.11] 07/03/2019 Yes    Atrial fibrillation, chronic [I48.2]  01/02/2013 Yes     Chronic    Weakness of lower extremity [R29.898] 07/03/2019 Yes    JEAN-PIERRE (acute kidney injury) [N17.9] 06/03/2016 Yes    Shoulder pain, right [M25.511] 07/04/2019 Yes    Essential hypertension [I10] 01/02/2013 Yes     Chronic    Personal history of venous thrombosis and embolism [Z86.718] 09/22/2013 Not Applicable    Chronic anticoagulation [Z79.01] 01/01/2013 Not Applicable     Chronic    Chronic kidney disease [N18.9] 05/31/2016 Yes     Chronic    Lumbar back pain [M54.5] 07/06/2015 Yes    Venous stasis of lower extremity [I87.8] 05/15/2019 Yes    Neuropathy of both feet [G57.93] 07/03/2019 Yes    Prediabetes [R73.03] 07/03/2019 Yes    Elevated alkaline phosphatase level [R74.8] 12/10/2016 Yes    Non compliance w medication regimen [Z91.14] 01/01/2013 Not Applicable     Chronic    History of CVA (cerebrovascular accident) [Z86.73] 01/01/2013 Not Applicable      Problems Resolved During this Admission:     VTE Risk Mitigation (From admission, onward)        Ordered     warfarin (COUMADIN) tablet 10 mg  Daily      07/06/19 0949     enoxaparin injection 120 mg  Every 12 hours (non-standard times)      07/05/19 2210     IP VTE HIGH RISK PATIENT  Once      07/03/19 0955     Place sequential compression device  Until discontinued      07/03/19 0955     Place CAMI hose  Until discontinued      07/03/19 0841             José Luis Langford MD  Department of Hospital Medicine   [facility]

## 2019-07-09 NOTE — PLAN OF CARE
Problem: Adult Inpatient Plan of Care  Goal: Plan of Care Review  Outcome: Ongoing (interventions implemented as appropriate)  Pt free of falls/trauma/injuries.  Denies c/o SOB, CP, or discomfort.  Generalized skin remains CDI; moderated generalized  edema noted.  Pt conitnues being diuresed with lasix 10mg/hr 5ml/hr; diuresing well.Pt continues to c/o pain to left shoulder and is receiving lidocaine patches and  morphine 15 mg tablet q 4 hrs prn for pain to help with the pain.Neurology consulted and is following pt.  Pt tolerating plan of care.

## 2019-07-09 NOTE — CARE UPDATE
Notified by nursing that patient is refusing both forms of potassium chloride tabs or capsules due to claim he cannot swallow them, refused potassium chloride solution     K-lyte reported on backorder, will try to order.     Pt with afib and reports of NSVT runs and frequent, on lasix drip will require potassium repletion.     Continued refusal of recommended therapies are likely detrimental to patients overall health.   If potassium unable to be given or worsening arrhythmia,     Likely have to stop lasix drip until electrolyte is stable.         José Luis Langford M.D.  Attending Physician  [facility] Medicine Dept.  Pager: [phone]  Spectralink -[phone]

## 2019-07-09 NOTE — PLAN OF CARE
Problem: Adult Inpatient Plan of Care  Goal: Plan of Care Review  Outcome: Ongoing (interventions implemented as appropriate)  Lasix drip at 10mg/h. Continue to monitor daily wt, labs I&0 . Fall precaution maintained. C/o rt shoulder pain without much relief. Colchicine added. poc discussed with pt. Verbalize understanding.

## 2019-07-09 NOTE — PLAN OF CARE
07/09/19 1335   Discharge Reassessment   Assessment Type Discharge Planning Reassessment   Do you have any problems affording any of your prescribed medications? No   Discharge Plan A Home Health   Discharge Plan B Skilled Nursing Facility   DME Needed Upon Discharge  none   Anticipated Discharge Disposition Home-Health   Can the patient answer the patient profile reliably? Yes, cognitively intact   How does the patient rate their overall health at the present time? Fair   Describe the patient's ability to walk at the present time. No restrictions   How often would a person be available to care for the patient? Never   Number of comorbid conditions (as recorded on the chart) Five or more   Post-Acute Status   Post-Acute Authorization Home Health/Hospice   Home Health/Hospice Status Awaiting Internal Medical Clearance     Patient awake & alert sitting in recliner when CM rounded. No family present. Patient was admitted with CHF. Continuous lasix gtt in progress. Patient lives alone, will need assistance with transportation at time of discharge, & was [facility] from [facility] on 6/[facility]. CM was informed by the [facility] that the patient's last INR result drawn on 6/26/19 was 1.2. INR was 1.5 this AM. CM informed Dr Carlin Swift's (PCP) office of patient's hospitalization. DON was informed that Dr. Swift has a walk-in clinic open M-Th [phone] & F-Sat [phone]. CM encouraged patient to go to walk-in clinic 1-2 weeks following discharge. Patient verbalized understanding. Will continue to follow.

## 2019-07-09 NOTE — PLAN OF CARE
Problem: Adult Inpatient Plan of Care  Goal: Plan of Care Review  Outcome: Ongoing (interventions implemented as appropriate)  LASIX MAINTAINED AT 10MG/H, 600 ML UPO X 12 HOURS. C/O NAUSEA AND VOMITING RELIEVED WITH ZOFRAN. C/O RT SHOULDER PAIN BUT REFUSED SCHEDULED LIDOCAINE. TELE WITH [employer]ULTIF[employer] P[employer]'S PT COULD NOT BED BE ENCOURAGED TO TAKE K+ REPLACEMENT, CAPSULE AND LIQUID OFFERED. BLE EDEMA PERSIST. TEDS INTACT. WILL CONTINUE TO MONITOR.

## 2019-07-09 NOTE — CONSULTS
PHARMACY CONSULT NOTE: WARFARIN    Hamlet Terrell is a 53 y.o. male on warfarin therapy for Atrial Fibrillation. PharmD has been consulted for warfarin dosing.    Current order: warfarin 10 mg daily with lovenox bridge   Home dose: 6 mg daily   Coumadin clinic enrollment: Active  INR goal: 2-3    Lab Results   Component Value Date    INR 1.5 (H) 07/09/2019    INR 1.2 07/08/2019    INR 1.2 07/07/2019    INR 1.1 07/07/2019     Diet: Adult Cardiac    Recommendation(s):    Continue warfarin 10 mg daily and lovenox 1 mg/kg q12h bridge until INR > 2.0   Pharmacy will continue to follow and monitor warfarin    Thank you for the consult,  Bina Garcias, PharmD, BCPS  [id number]     **Note: Consults are reviewed Monday-Friday 7:00am-3:30pm. THE ABOVE RECOMMENDATIONS ARE ONLY SUGGESTED.THE RECOMMENDATIONS SHOULD BE CONSIDERED IN CONJUNCTION WITH ALL PATIENT FACTORS. **

## 2019-07-09 NOTE — SUBJECTIVE & OBJECTIVE
Interval History: NAOE. Denies chest pain, shortness of breath, dyspnea on exertion.    Review of Systems   Constitution: Negative. Negative for chills and fever.   HENT: Negative.    Eyes: Negative.    Cardiovascular: Negative for chest pain, claudication and paroxysmal nocturnal dyspnea.   Respiratory: Negative for cough, shortness of breath and wheezing.    Endocrine: Negative.    Hematologic/Lymphatic: Does not bruise/bleed easily.   Skin: Negative for nail changes and rash.   Musculoskeletal: Negative.  Negative for back pain.   Gastrointestinal: Negative for abdominal pain, melena, nausea and vomiting.   Neurological: Negative for dizziness and headaches.   Psychiatric/Behavioral: Negative for altered mental status, depression and substance abuse.   Allergic/Immunologic: Negative.      Objective:     Vital Signs (Most Recent):  Temp: 96.7 °F (35.9 °C) (07/09/19 1546)  Pulse: 63 (07/09/19 1546)  Resp: 18 (07/09/19 1546)  BP: 101/66 (07/09/19 1546)  SpO2: (!) 94 % (07/09/19 1546) Vital Signs (24h Range):  Temp:  [96.7 °F (35.9 °C)-98.6 °F (37 °C)] 96.7 °F (35.9 °C)  Pulse:  [63-94] 63  Resp:  [16-18] 18  SpO2:  [82 %-99 %] 94 %  BP: (101-147)/(53-66) 101/66     Weight: 113.8 kg (250 lb 14.1 oz)  Body mass index is 37.05 kg/m².     SpO2: (!) 94 %  O2 Device (Oxygen Therapy): room air      Intake/Output Summary (Last 24 hours) at 7/9/2019 1716  Last data filed at 7/9/2019 1400  Gross per 24 hour   Intake 696 ml   Output 875 ml   Net -179 ml       Lines/Drains/Airways     Peripheral Intravenous Line                 Peripheral IV - Single Lumen 07/05/19 1545 20 G Left Hand 4 days                Physical Exam   Constitutional: He is oriented to person, place, and time. He appears well-developed and well-nourished.   HENT:   Head: Normocephalic and atraumatic.   Eyes: Pupils are equal, round, and reactive to light. Conjunctivae and EOM are normal.   Neck: No JVD present.   Cardiovascular: Normal rate, regular rhythm,  normal heart sounds and intact distal pulses. Exam reveals no gallop and no friction rub.   No murmur heard.  Pulmonary/Chest: Effort normal. No stridor. He has no wheezes. He has no rales. He exhibits no tenderness.   Abdominal: Soft. Bowel sounds are normal. He exhibits no distension and no mass. There is no tenderness. There is no guarding.   Musculoskeletal: He exhibits no edema, tenderness or deformity.   Neurological: He is alert and oriented to person, place, and time.   Skin: Skin is warm and dry. No rash noted. No erythema.   Psychiatric: He has a normal mood and affect.       Significant Labs:    Lab Results   Component Value Date    WBC 6.99 07/08/2019    HGB 7.5 (L) 07/08/2019    HCT 24.5 (L) 07/08/2019    MCV 77 (L) 07/08/2019     07/08/2019       BMP  Lab Results   Component Value Date     (L) 07/09/2019    K 4.2 07/09/2019    CL 89 (L) 07/09/2019    CO2 31 (H) 07/09/2019    BUN 60 (H) 07/09/2019    CREATININE 2.5 (H) 07/09/2019    CALCIUM 10.0 07/09/2019    ANIONGAP 15 07/09/2019    ESTGFRAFRICA 32.9 (A) 07/09/2019    EGFRNONAA 28.5 (A) 07/09/2019         Significant Imaging: Reviewed in Epic

## 2019-07-09 NOTE — PROGRESS NOTES
[facility]  Cardiology  Progress Note    Patient Name: Hamlet Terrell  MRN: [id number]  Admission Date: 7/2/2019  Hospital Length of Stay: 5 days  Code Status: Full Code   Attending Physician: Oly Welsh MD   Primary Care Physician: Carlin Swift MD  Expected Discharge Date: 7/10/2019  Principal Problem:Acute on chronic combined systolic and diastolic heart failure    Subjective:     Hospital Course:   No notes on file    Interval History: NAOE. Denies chest pain, shortness of breath, dyspnea on exertion.    Review of Systems   Constitution: Negative. Negative for chills and fever.   HENT: Negative.    Eyes: Negative.    Cardiovascular: Negative for chest pain, claudication and paroxysmal nocturnal dyspnea.   Respiratory: Negative for cough, shortness of breath and wheezing.    Endocrine: Negative.    Hematologic/Lymphatic: Does not bruise/bleed easily.   Skin: Negative for nail changes and rash.   Musculoskeletal: Negative.  Negative for back pain.   Gastrointestinal: Negative for abdominal pain, melena, nausea and vomiting.   Neurological: Negative for dizziness and headaches.   Psychiatric/Behavioral: Negative for altered mental status, depression and substance abuse.   Allergic/Immunologic: Negative.      Objective:     Vital Signs (Most Recent):  Temp: 96.7 °F (35.9 °C) (07/09/19 1546)  Pulse: 63 (07/09/19 1546)  Resp: 18 (07/09/19 1546)  BP: 101/66 (07/09/19 1546)  SpO2: (!) 94 % (07/09/19 1546) Vital Signs (24h Range):  Temp:  [96.7 °F (35.9 °C)-98.6 °F (37 °C)] 96.7 °F (35.9 °C)  Pulse:  [63-94] 63  Resp:  [16-18] 18  SpO2:  [82 %-99 %] 94 %  BP: (101-147)/(53-66) 101/66     Weight: 113.8 kg (250 lb 14.1 oz)  Body mass index is 37.05 kg/m².     SpO2: (!) 94 %  O2 Device (Oxygen Therapy): room air      Intake/Output Summary (Last 24 hours) at 7/9/2019 1716  Last data filed at 7/9/2019 1400  Gross per 24 hour   Intake 696 ml   Output 875 ml   Net -179 ml       Lines/Drains/Airways      Peripheral Intravenous Line                 Peripheral IV - Single Lumen 07/05/19 1545 20 G Left Hand 4 days                Physical Exam   Constitutional: He is oriented to person, place, and time. He appears well-developed and well-nourished.   HENT:   Head: Normocephalic and atraumatic.   Eyes: Pupils are equal, round, and reactive to light. Conjunctivae and EOM are normal.   Neck: No JVD present.   Cardiovascular: Normal rate, regular rhythm, normal heart sounds and intact distal pulses. Exam reveals no gallop and no friction rub.   No murmur heard.  Pulmonary/Chest: Effort normal. No stridor. He has no wheezes. He has no rales. He exhibits no tenderness.   Abdominal: Soft. Bowel sounds are normal. He exhibits no distension and no mass. There is no tenderness. There is no guarding.   Musculoskeletal: He exhibits no edema, tenderness or deformity.   Neurological: He is alert and oriented to person, place, and time.   Skin: Skin is warm and dry. No rash noted. No erythema.   Psychiatric: He has a normal mood and affect.       Significant Labs:    Lab Results   Component Value Date    WBC 6.99 07/08/2019    HGB 7.5 (L) 07/08/2019    HCT 24.5 (L) 07/08/2019    MCV 77 (L) 07/08/2019     07/08/2019       BMP  Lab Results   Component Value Date     (L) 07/09/2019    K 4.2 07/09/2019    CL 89 (L) 07/09/2019    CO2 31 (H) 07/09/2019    BUN 60 (H) 07/09/2019    CREATININE 2.5 (H) 07/09/2019    CALCIUM 10.0 07/09/2019    ANIONGAP 15 07/09/2019    ESTGFRAFRICA 32.9 (A) 07/09/2019    EGFRNONAA 28.5 (A) 07/09/2019         Significant Imaging: Reviewed in Epic    Assessment and Plan:       * Acute on chronic combined systolic and diastolic heart failure  ADHF in the setting of dietary, fluid and medication noncompliance   - Profile B, NYHA III,      - NICM, negative PET stress    - Most recent TTE demonstrates: LVEF 23%,      - Diuretic: Patient close to euvolemic, repeat BNP and Cr suggestive of volume  contraction. Would transition to home regimen PO Lasix 40 mg bid  - BB: Would consolidate metop tartrate to 50mg PO qd  - ACE/ARB: hold in setting of JEAN-PIERRE, can resume at outpatient clinic visit in 1 week, can follow up with Dr. Checo Oneill in cardiology clinic  - afterload reduction: continue bid bidil  - Device: declining, can readdress in clinic  - Would continue education re fluid/sodium restriction  - Check Electrolytes, keep Mag >2 & K >4    Atrial fibrillation, chronic  Permanent Afib:   - CHADS-VASC 3  - History of CVA 2009 and presumed TIA on this admission, would continue bridging pending therapeutic warfarin  - Follow up in AC clinic  - BB as above      Thank you for this consult. Cardiology will sign off, please feel free to call with further questions as they arise.      VTE Risk Mitigation (From admission, onward)        Ordered     warfarin (COUMADIN) tablet 10 mg  Daily      07/06/19 0949     enoxaparin injection 120 mg  Every 12 hours (non-standard times)      07/05/19 2210     IP VTE HIGH RISK PATIENT  Once      07/03/19 0955     Place sequential compression device  Until discontinued      07/03/19 0955     Place CAMI hose  Until discontinued      07/03/19 0841          Checo Oneill MD  Cardiology  [facility]

## 2019-07-10 LAB
ALBUMIN SERPL BCP-MCNC: 3.3 G/DL (ref 3.5–5.2)
ALP SERPL-CCNC: 193 U/L (ref 55–135)
ALT SERPL W/O P-5'-P-CCNC: 9 U/L (ref 10–44)
ANION GAP SERPL CALC-SCNC: 17 MMOL/L (ref 8–16)
AST SERPL-CCNC: 21 U/L (ref 10–40)
BILIRUB SERPL-MCNC: 0.8 MG/DL (ref 0.1–1)
BUN SERPL-MCNC: 71 MG/DL (ref 6–20)
CALCIUM SERPL-MCNC: 10.4 MG/DL (ref 8.7–10.5)
CHLORIDE SERPL-SCNC: 89 MMOL/L (ref 95–110)
CO2 SERPL-SCNC: 28 MMOL/L (ref 23–29)
CREAT SERPL-MCNC: 2.9 MG/DL (ref 0.5–1.4)
CREAT UR-MCNC: 170 MG/DL (ref 23–375)
CREAT UR-MCNC: 170 MG/DL (ref 23–375)
EST. GFR  (AFRICAN AMERICAN): 27.5 ML/MIN/1.73 M^2
EST. GFR  (NON AFRICAN AMERICAN): 23.8 ML/MIN/1.73 M^2
GLUCOSE SERPL-MCNC: 91 MG/DL (ref 70–110)
INR PPP: 1.9 (ref 0.8–1.2)
MAGNESIUM SERPL-MCNC: 2.3 MG/DL (ref 1.6–2.6)
POTASSIUM SERPL-SCNC: 4.1 MMOL/L (ref 3.5–5.1)
PROT SERPL-MCNC: 9.2 G/DL (ref 6–8.4)
PROT UR-MCNC: 23 MG/DL (ref 0–15)
PROT/CREAT UR: 0.14 MG/G{CREAT} (ref 0–0.2)
PROTHROMBIN TIME: 18.9 SEC (ref 9–12.5)
SODIUM SERPL-SCNC: 134 MMOL/L (ref 136–145)
SODIUM UR-SCNC: <20 MMOL/L (ref 20–250)

## 2019-07-10 PROCEDURE — 86334 IMMUNOFIX E-PHORESIS SERUM: CPT

## 2019-07-10 PROCEDURE — 80053 COMPREHEN METABOLIC PANEL: CPT

## 2019-07-10 PROCEDURE — 63600175 PHARM REV CODE 636 W HCPCS: Performed by: HOSPITALIST

## 2019-07-10 PROCEDURE — 25000003 PHARM REV CODE 250: Performed by: PHYSICIAN ASSISTANT

## 2019-07-10 PROCEDURE — 83735 ASSAY OF MAGNESIUM: CPT

## 2019-07-10 PROCEDURE — 99233 SBSQ HOSP IP/OBS HIGH 50: CPT | Mod: ,,, | Performed by: INTERNAL MEDICINE

## 2019-07-10 PROCEDURE — 25000003 PHARM REV CODE 250: Performed by: INTERNAL MEDICINE

## 2019-07-10 PROCEDURE — 84165 PROTEIN E-PHORESIS SERUM: CPT | Mod: 26,,, | Performed by: PATHOLOGY

## 2019-07-10 PROCEDURE — 25000003 PHARM REV CODE 250: Performed by: HOSPITALIST

## 2019-07-10 PROCEDURE — 25000003 PHARM REV CODE 250: Performed by: STUDENT IN AN ORGANIZED HEALTH CARE EDUCATION/TRAINING PROGRAM

## 2019-07-10 PROCEDURE — 86334 PATHOLOGIST INTERPRETATION IFE: ICD-10-PCS | Mod: 26,,, | Performed by: PATHOLOGY

## 2019-07-10 PROCEDURE — 85610 PROTHROMBIN TIME: CPT

## 2019-07-10 PROCEDURE — 99232 PR SUBSEQUENT HOSPITAL CARE,LEVL II: ICD-10-PCS | Mod: ,,, | Performed by: INTERNAL MEDICINE

## 2019-07-10 PROCEDURE — 36415 COLL VENOUS BLD VENIPUNCTURE: CPT

## 2019-07-10 PROCEDURE — 63600175 PHARM REV CODE 636 W HCPCS: Performed by: INTERNAL MEDICINE

## 2019-07-10 PROCEDURE — 99233 PR SUBSEQUENT HOSPITAL CARE,LEVL III: ICD-10-PCS | Mod: ,,, | Performed by: INTERNAL MEDICINE

## 2019-07-10 PROCEDURE — 82570 ASSAY OF URINE CREATININE: CPT

## 2019-07-10 PROCEDURE — 99232 SBSQ HOSP IP/OBS MODERATE 35: CPT | Mod: ,,, | Performed by: INTERNAL MEDICINE

## 2019-07-10 PROCEDURE — 84300 ASSAY OF URINE SODIUM: CPT

## 2019-07-10 PROCEDURE — 84165 PATHOLOGIST INTERPRETATION SPE: ICD-10-PCS | Mod: 26,,, | Performed by: PATHOLOGY

## 2019-07-10 PROCEDURE — 20600001 HC STEP DOWN PRIVATE ROOM

## 2019-07-10 PROCEDURE — 86334 IMMUNOFIX E-PHORESIS SERUM: CPT | Mod: 26,,, | Performed by: PATHOLOGY

## 2019-07-10 PROCEDURE — 84165 PROTEIN E-PHORESIS SERUM: CPT

## 2019-07-10 RX ORDER — WARFARIN 7.5 MG/1
7.5 TABLET ORAL DAILY
Status: DISCONTINUED | OUTPATIENT
Start: 2019-07-10 | End: 2019-07-16

## 2019-07-10 RX ORDER — HYDROMORPHONE HYDROCHLORIDE 5 MG/5ML
1 SOLUTION ORAL EVERY 4 HOURS PRN
Status: DISCONTINUED | OUTPATIENT
Start: 2019-07-10 | End: 2019-07-18 | Stop reason: HOSPADM

## 2019-07-10 RX ORDER — FUROSEMIDE 10 MG/ML
80 INJECTION INTRAMUSCULAR; INTRAVENOUS 2 TIMES DAILY
Status: DISCONTINUED | OUTPATIENT
Start: 2019-07-10 | End: 2019-07-11

## 2019-07-10 RX ORDER — AMOXICILLIN 250 MG
1 CAPSULE ORAL 2 TIMES DAILY
Status: DISCONTINUED | OUTPATIENT
Start: 2019-07-10 | End: 2019-07-18 | Stop reason: HOSPADM

## 2019-07-10 RX ORDER — GABAPENTIN 250 MG/5ML
100 SOLUTION ORAL 2 TIMES DAILY
Status: DISCONTINUED | OUTPATIENT
Start: 2019-07-10 | End: 2019-07-18 | Stop reason: HOSPADM

## 2019-07-10 RX ADMIN — METOPROLOL TARTRATE 25 MG: 25 TABLET, FILM COATED ORAL at 12:07

## 2019-07-10 RX ADMIN — SENNOSIDES,DOCUSATE SODIUM 1 TABLET: 8.6; 5 TABLET, FILM COATED ORAL at 09:07

## 2019-07-10 RX ADMIN — POLYETHYLENE GLYCOL 3350 17 G: 17 POWDER, FOR SOLUTION ORAL at 08:07

## 2019-07-10 RX ADMIN — FERROUS SULFATE TAB EC 325 MG (65 MG FE EQUIVALENT) 325 MG: 325 (65 FE) TABLET DELAYED RESPONSE at 08:07

## 2019-07-10 RX ADMIN — HYDRALAZINE HYDROCHLORIDE AND ISOSORBIDE DINITRATE 1 TABLET: 37.5; 2 TABLET, FILM COATED ORAL at 08:07

## 2019-07-10 RX ADMIN — FUROSEMIDE 80 MG: 10 INJECTION, SOLUTION INTRAMUSCULAR; INTRAVENOUS at 06:07

## 2019-07-10 RX ADMIN — MENTHOL, METHYL SALICYLATE: 10; 15 CREAM TOPICAL at 04:07

## 2019-07-10 RX ADMIN — WARFARIN SODIUM 7.5 MG: 7.5 TABLET ORAL at 04:07

## 2019-07-10 RX ADMIN — MENTHOL, METHYL SALICYLATE: 10; 15 CREAM TOPICAL at 09:07

## 2019-07-10 RX ADMIN — GABAPENTIN 300 MG: 250 SOLUTION ORAL at 08:07

## 2019-07-10 RX ADMIN — METHOCARBAMOL TABLETS 500 MG: 500 TABLET, COATED ORAL at 08:07

## 2019-07-10 RX ADMIN — ASPIRIN 81 MG: 81 TABLET, COATED ORAL at 08:07

## 2019-07-10 RX ADMIN — PANTOPRAZOLE SODIUM 40 MG: 40 TABLET, DELAYED RELEASE ORAL at 08:07

## 2019-07-10 RX ADMIN — GABAPENTIN 100 MG: 250 SOLUTION ORAL at 08:07

## 2019-07-10 RX ADMIN — RAMELTEON 8 MG: 8 TABLET, FILM COATED ORAL at 08:07

## 2019-07-10 RX ADMIN — SENNOSIDES 8.6 MG: 8.6 TABLET, FILM COATED ORAL at 08:07

## 2019-07-10 RX ADMIN — ENOXAPARIN SODIUM 120 MG: 150 INJECTION SUBCUTANEOUS at 12:07

## 2019-07-10 RX ADMIN — METOPROLOL TARTRATE 25 MG: 25 TABLET, FILM COATED ORAL at 05:07

## 2019-07-10 RX ADMIN — FUROSEMIDE 80 MG: 80 TABLET ORAL at 08:07

## 2019-07-10 RX ADMIN — METOPROLOL TARTRATE 25 MG: 25 TABLET, FILM COATED ORAL at 11:07

## 2019-07-10 RX ADMIN — METOPROLOL TARTRATE 25 MG: 25 TABLET, FILM COATED ORAL at 06:07

## 2019-07-10 RX ADMIN — HYDRALAZINE HYDROCHLORIDE AND ISOSORBIDE DINITRATE 1 TABLET: 37.5; 2 TABLET, FILM COATED ORAL at 09:07

## 2019-07-10 NOTE — PLAN OF CARE
Problem: Adult Inpatient Plan of Care  Goal: Plan of Care Review  Outcome: Ongoing (interventions implemented as appropriate)  Pt free of falls/trauma/injuries.  Denies c/o SOB, CP, or discomfort.  Generalized skin remains CDI; moderated generalized  edema noted.  Pt conitnues being diuresed with lasix po but recived n/o for ivp 80 mg today; diuresing well.Pt continues to c/o pain to left shoulder and is receiving lidocaine patches which he refused today as well received n/o for hydromorphine 1mg/ml for pain to help with the pain.Neurology consulted and is following pt.  Pt tolerating plan of care

## 2019-07-10 NOTE — CONSULTS
[facility]  Nephrology  Consult Note    Patient Name: Hamlet Terrell  MRN: [id number]  Admission Date: 7/2/2019  Hospital Length of Stay: 6 days  Attending Provider: Oly Welsh MD   Primary Care Physician: Carlin Swift MD  Principal Problem:Acute on chronic combined systolic and diastolic heart failure    Inpatient consult to Nephrology  Consult performed by: Brooke Mao MD  Consult ordered by: Oly Welsh MD        Subjective:     HPI: Mr. Terrell is a 53M with HFrEF (4/2019 EF 23%, DD, PAP 47, CVP 15), HTN, AFib on coumadin, AFlutter s/p ablation, CVA 2009, CAD who presented on 7/2 with R shoulder/arm pain and ultimately admitted for acute on chronic combined heart failure. Patient reports that he is unable to lay flat at home and normally sleeps on 2 pillows. He is able to ambulate around his home without getting SOB, but does not leave home often. He does not use any walking aids like a walker or cane. He does have LE edema but has not personally noticed any worsening in edema from baseline. Denied excessive salt and fluid intake in daily diet.    On admit, he was found to have a BNP of 554 and CXR positive for congestion. He was given multiple doses of lasix and subsequently placed on lasix gtt until 7/9. He had had good UOP of 1-2.5L per day. Initially, JEAN-PIERRE was attributed to cardiorenal syndrome, but nephrology consulted on 7/10 due to worsening renal function.    Past Medical History:   Diagnosis Date    Anticoagulant long-term use     Cardiomegaly     Chronic combined systolic and diastolic congestive heart failure     Coronary artery disease     Heart attack 2006    Hypertension     Hyperthyroidism, subclinical 1/2/2013    MI (myocardial infarction) 9/22/2013    MI in 2009      Paroxysmal atrial fibrillation     PE (pulmonary embolism) 1/1/2013    IN 2010     S/P ablation of atrial flutter 2008    Stroke 2009    no residual weaknesses       Past  Surgical History:   Procedure Laterality Date    RADIOFREQUENCY ABLATION  01/08/2008    for atrial flutter       Review of patient's allergies indicates:   Allergen Reactions    Acetaminophen      Itching    Oxycodone-acetaminophen      Other reaction(s): Itching    Ace inhibitors Other (See Comments)     cough     Current Facility-Administered Medications   Medication Frequency    albuterol inhaler 1 puff Q6H PRN    aspirin EC tablet 81 mg Daily    calcium carbonate 200 mg calcium (500 mg) chewable tablet 500 mg TID PRN    diphenhydrAMINE capsule 25 mg Q6H PRN    enoxaparin injection 120 mg Q12H    ergocalciferol capsule 50,000 Units Q7 Days    ferrous sulfate EC tablet 325 mg Daily    furosemide tablet 80 mg BID    gabapentin 250 mg/5 mL (5 mL) solution 300 mg BID    isosorbide-hydrALAZINE 20-37.5 mg per tablet 1 tablet BID    lidocaine 5 % patch 3 patch Q24H    methocarbamol tablet 500 mg BID PC    methyl salicylate-menthol 15-10% cream TID    metoprolol tartrate (LOPRESSOR) tablet 25 mg Q6H    morphine 20 mg/mL concentrated syringe 10 mg Q4H PRN    nitroGLYCERIN SL tablet 0.4 mg Q5 Min PRN    ondansetron injection 4 mg Q12H PRN    pantoprazole EC tablet 40 mg Daily    polyethylene glycol packet 17 g BID    ramelteon tablet 8 mg Nightly PRN    senna-docusate 8.6-50 mg per tablet 1 tablet BID    sodium chloride 0.9% flush 10 mL PRN    sodium chloride 0.9% flush 10 mL PRN    warfarin (COUMADIN) tablet 7.5 mg Daily     Family History     Problem Relation (Age of Onset)    Alcohol abuse Father    Hypertension Mother, Father, Sister, Brother    Stroke Mother        Tobacco Use    Smoking status: Never Smoker    Smokeless tobacco: Never Used   Substance and Sexual Activity    Alcohol use: No    Drug use: No    Sexual activity: Never     Review of Systems   Constitutional: Negative for activity change, chills and fever.   HENT: Negative for congestion, sinus pressure, sinus pain and sore  throat.    Eyes: Negative for photophobia and pain.   Respiratory: Negative for cough, shortness of breath and wheezing.    Cardiovascular: Positive for leg swelling. Negative for chest pain.   Gastrointestinal: Negative for abdominal pain, blood in stool, constipation, diarrhea, nausea and vomiting.   Genitourinary: Negative for dysuria, hematuria and urgency.   Musculoskeletal: Positive for arthralgias and myalgias.   Neurological: Negative for dizziness, speech difficulty and light-headedness.   Psychiatric/Behavioral: Negative for agitation, behavioral problems, confusion and decreased concentration.     Objective:     Vital Signs (Most Recent):  Temp: 98.7 °F (37.1 °C) (07/10/19 0850)  Pulse: 71 (07/10/19 0850)  Resp: 18 (07/10/19 0850)  BP: 126/63 (07/10/19 0850)  SpO2: (!) 92 % (07/10/19 0850)  O2 Device (Oxygen Therapy): room air (07/10/19 0700) Vital Signs (24h Range):  Temp:  [96.7 °F (35.9 °C)-98.9 °F (37.2 °C)] 98.7 °F (37.1 °C)  Pulse:  [63-86] 71  Resp:  [18-20] 18  SpO2:  [92 %-99 %] 92 %  BP: (101-147)/(53-69) 126/63     Weight: 115.7 kg (255 lb 1.2 oz) (07/10/19 0400)  Body mass index is 37.67 kg/m².  Body surface area is 2.37 meters squared.    I/O last 3 completed shifts:  In: 1056 [P.O.:1016; I.V.:40]  Out: 1065 [Urine:1065]    Physical Exam   Constitutional: He is oriented to person, place, and time. He appears well-developed and well-nourished. No distress.   HENT:   Mouth/Throat: No oropharyngeal exudate.   Eyes: Pupils are equal, round, and reactive to light. Conjunctivae are normal. No scleral icterus.   Neck: Normal range of motion. No thyromegaly present.   Cardiovascular: Normal heart sounds and intact distal pulses.   Heart rate irregularly irregular   Pulmonary/Chest: Effort normal. No respiratory distress. He has no wheezes. He has rales (bibasilar).   Abdominal: Soft. Bowel sounds are normal. He exhibits no distension. There is no tenderness. There is no guarding.   Musculoskeletal:  He exhibits edema (2+ pitting BLE up to knees) and tenderness (Over R shoulder).   Lymphadenopathy:     He has no cervical adenopathy.   Neurological: He is alert and oriented to person, place, and time.   Skin: Skin is warm and dry. He is not diaphoretic.   Psychiatric: He has a normal mood and affect. His behavior is normal. Judgment and thought content normal.   Vitals reviewed.      Significant Labs:  CBC:   Recent Labs   Lab 07/08/19  0511   WBC 6.99   RBC 3.18*   HGB 7.5*   HCT 24.5*      MCV 77*   MCH 23.6*   MCHC 30.6*     CMP:   Recent Labs   Lab 07/10/19  0536   GLU 91   CALCIUM 10.4   ALBUMIN 3.3*   PROT 9.2*   *   K 4.1   CO2 28   CL 89*   BUN 71*   CREATININE 2.9*   ALKPHOS 193*   ALT 9*   AST 21   BILITOT 0.8     Recent Labs   Lab 07/05/19  2300   COLORU Straw   SPECGRAV 1.005   PHUR 5.0   PROTEINUA Negative   NITRITE Negative   LEUKOCYTESUR Negative     All labs within the past 24 hours have been reviewed.    Significant Imaging:    X-Ray Chest AP Portable  Narrative: EXAMINATION:  XR CHEST AP PORTABLE    CLINICAL HISTORY:  evaluate for pulmonary edema/congestion in patient with CHF exacerbation;    TECHNIQUE:  Single frontal view of the chest was performed.    COMPARISON:  No 07/04/2019 ne    FINDINGS:  Cardiomegaly and pulmonary vascular congestion.  Ill-defined opacities at the right lung base possible small airspace disease or atelectatic changes.  Slightly blunted costophrenic angles suggests small amount of pleural effusion.  The upper lung fields are clear.  No significant changes  Impression: See above    Electronically signed by: Jeffrey Clark MD  Date:    07/08/2019  Time:    14:10        Assessment/Plan:     JEAN-PIERRE (acute kidney injury)  BL sCr 1.3 last on 7/2 day of admit  sCr has been trending up to 2.9 today  Was on lasix pushes, then started on gtt until 7/9 with good UOP - net negative 5.5L total  Previously JEAN-PIERRE believed 2/2 cardiorenal syndrome as patient had worsening renal  function in setting of heart failure exacerbation, but renal function continued to decline despite diuresis  Patient has been getting primarily morphine for pain relief throughout admission, started on gabapentin, and has been on therapeutic dose lovenox as a bridge for warfarin as he was found to have subtherapeutic INR  BP has been a bit labile with dips down to 80s/50s, but currently stable    JEAN-PIERRE likely to be multifactorial - Congestion from HF, multiple potentially nephrotoxic meds, hypotensive episodes.    Plan:  - INR today 1.9, would stop lovenox and titrate warfarin  - Previous notes indicate eliquis has been offered as an alternative form of AC; in this patient with questionable compliance with warfarin, maybe consider broaching topic again?  - Would decrease dose of gabapentin to maximum 300mg daily  - Stop morphine as pain control - liquid dilaudid would be better option in patient with poor renal function  - Increase diuresis to lasix 80mg IV bid  - Strict Is and Os and daily weights  - Avoid further nephrotoxic meds  - Will monitor renal function        Thank you for your consult. I will follow-up with patient. Please contact us if you have any additional questions.    Brooke Mao MD  Nephrology  [facility]

## 2019-07-10 NOTE — PROGRESS NOTES
"Hospital Medicine  Progress Note    Team: [facility] HOSP MED D Oly Welsh MD  Admit Date: 7/2/2019  DELMI 7/10/2019  Length of Stay:  LOS: 6 days   Code status: Full Code    Principal Problem:  Acute on chronic combined systolic and diastolic heart failure    HPI:  "54 yo M with combined CHF (Echo 4/2019 with EF 23%, diastolic dysfuction, moderated MR and TR), HTN, chronic A-fib (on Coumadin), A-flutter s/p ablation, CVA (in 2009), CAD, ?PE (patient reported), non-compliance, and drug seeking behavior presented right shoulder/arm pain following a fall injury associated with right leg weakness. Patient reports waking up to go to the bathroom during the night approximately 4 days ago when he was unable to bare weight on right leg due to weakness. He attempted to get back in the bed and aggravated/injured his right shoulder climbing back into bed. He was hoping the pain would resolve on its own but it has persisted for past 4 days prompting him to come to ED. He reports he can move his right upper extremity but refuses to because he does not want to illicit the pain."    Interval history: Patient complains of persistent shoulder pain. BUN and sCr continue to increase despite reduction in Lasix as per Cardiology's recommendations.    Review of Systems   Constitutional: Negative for fever.   Cardiovascular: Negative for chest pain.   Musculoskeletal: Positive for joint pain.       Physical Exam  Physical Exam   Constitutional:  Non-toxic appearance. No distress.   Eyes: Conjunctivae and lids are normal.   Neck: JVD present.   Cardiovascular: S1 normal and S2 normal.   Pulmonary/Chest: Effort normal. He has rales in the right lower field and the left lower field.   Abdominal: Soft. Normal appearance and bowel sounds are normal. There is no tenderness.   Musculoskeletal: He exhibits edema.        Right shoulder: He exhibits decreased range of motion, tenderness and pain.   Neurological: He is alert. He is not disoriented. "       Temp:  [96.7 °F (35.9 °C)-98.9 °F (37.2 °C)]   Pulse:  [63-86]   Resp:  [18-20]   BP: (101-147)/(53-69)   SpO2:  [92 %-99 %]     Intake/Output Summary (Last 24 hours) at 7/10/2019 0854  Last data filed at 7/10/2019 0600  Gross per 24 hour   Intake 1056 ml   Output 865 ml   Net 191 ml       Recent Labs   Lab 07/06/19  0359 07/07/19  0638 07/08/19  0511   WBC 8.94 7.20 6.99   HGB 7.3* 7.1* 7.5*   HCT 24.7* 23.9* 24.5*    311 315     Recent Labs   Lab 07/08/19  1300 07/09/19  0407 07/10/19  0536   * 135* 134*   K 3.3* 4.2 4.1   CL 89* 89* 89*   CO2 33* 31* 28   BUN 56* 60* 71*   CREATININE 2.1* 2.5* 2.9*   * 113* 91   CALCIUM 10.1 10.0 10.4   MG 2.2 2.2 2.3     Recent Labs   Lab 07/08/19  0511 07/09/19  0407 07/10/19  0536   ALKPHOS 196* 204* 193*   ALT 10 8* 9*   AST 23 23 21   ALBUMIN 3.4* 3.4* 3.3*   PROT 9.2* 9.3* 9.2*   BILITOT 1.1* 0.9 0.8   INR 1.2 1.5* 1.9*      Recent Labs     07/09/19  0407   *     Recent Labs   Lab 07/02/19  2215   HGBA1C 6.0*       Scheduled Meds:   aspirin  81 mg Oral Daily    ergocalciferol  50,000 Units Oral Q7 Days    ferrous sulfate  325 mg Oral Daily    furosemide  80 mg Intravenous BID    gabapentin  100 mg Oral BID    isosorbide-hydrALAZINE 20-37.5 mg  1 tablet Oral BID    lidocaine  3 patch Transdermal Q24H    methocarbamol  500 mg Oral BID PC    methyl salicylate-menthol 15-10%   Topical (Top) TID    metoprolol tartrate  25 mg Oral Q6H    pantoprazole  40 mg Oral Daily    polyethylene glycol  17 g Oral BID    senna-docusate 8.6-50 mg  1 tablet Oral BID    warfarin  7.5 mg Oral Daily     Continuous Infusions:    As Needed:  albuterol, calcium carbonate, diphenhydrAMINE, morphine, nitroGLYCERIN, ondansetron, ramelteon, sodium chloride 0.9%, sodium chloride 0.9%    Active Hospital Problems    Diagnosis  POA    *Acute on chronic combined systolic and diastolic heart failure [I50.43]  Yes    Atrial fibrillation, chronic [I48.2]  Yes      Priority: Low     Chronic    Essential hypertension [I10]  Yes     Priority: Low     Chronic    Dysphagia for pills, idiopathic [R13.10]  Yes     Patient chews pills and opens capsules -- avoid sustained-release formulations.      Shoulder pain, right [M25.511]  Yes    Weakness of lower extremity [R29.898]  Yes    Acute pain of right shoulder due to trauma [M25.511, G89.11]  Yes    Neuropathy of both feet [G57.93]  Yes    Prediabetes [R73.03]  Yes    Venous stasis of lower extremity [I87.8]  Yes    Elevated alkaline phosphatase level [R74.8]  Yes    JEAN-PIERRE (acute kidney injury) [N17.9]  Yes    Chronic kidney disease [N18.9]  Yes     Chronic    Lumbar back pain [M54.5]  Yes    Personal history of venous thrombosis and embolism [Z86.718]  Not Applicable    Chronic anticoagulation [Z79.01]  Not Applicable     Chronic    Non compliance w medication regimen [Z91.14]  Not Applicable     Chronic    History of CVA (cerebrovascular accident) [Z86.73]  Not Applicable     Personal account, occurred in 2008/2009 at [facility].        Resolved Hospital Problems    Diagnosis Date Resolved POA    Febrile illness, acute [R50.9] 07/09/2019 Yes       Overview of Hospital Course:  54 yo M with combined CHF (Echo 4/2019 with EF 23%, diastolic dysfuction, moderated MR and TR), HTN, chronic A-fib (on Coumadin), A-flutter s/p ablation, CVA (in 2009), CAD, ?PE (patient reported), non-compliance presenting with musculoskeletal complaints and weakness admitted for acute on chronic combined heart failure and JEAN-PIERRE.    Assessment and Plan:    Acute on Chronic Combined systolic/diastolic heart failure   -Cardiology consulted, appreciate assistance  -continued Lasix infusion until 7/8  -Repeat ECHO ordered; cancelled by Cardiology  -Converted diuresis to home oral dose 7/9 as per Cardiology's recommendations.     Right sided weakness & acute pain   -IV opiates discontinued, on oral morphine PRN, changed to liquid due to  history of chewing pills.  -Gabapentin renal-dose ordered, initially refusing but now taking, titrate dose as tolerated.    -etiology for pian and weakness remains unclear, s/p MRI Brain/C/T/L spine  -Patient has chronic elevation of Uric Acid, prior synovial fluid studies negative for gout/pseudogout crystals > trial of empiric Colchicine 0.6mg X 1 did not improve symptoms.   -s/p Right shoulder arthrocentesis - gram stain negative, culture Aerobic with No Growth  -Neurology consulted - evaluated, concern for non-pathologic pattern/etiology of pain and weakness    -Lack of participation in medical care/testing is limiting clinical evaluations.   -Continue OT/PT; plan for SNF     Febrile Illness - resolved  -febrile a few hours after admission on 7/3 - 7/4 . Afebrile since.   -Procal elevated, blood cultures negative  -initial UA w/o signs of infection  -CXR w/o signs of new infiltrate  -Empiric Vanc & Zosyn discontinued  -synovial fluid cultures show no growth so far     Atrial Fibrillation   -Beta-blocker changed to Lopressor 25mg q6hrs.   -Therapeutic Lovenox with INR low  -warfarin at 10mg dosing, PharmD consulted..   -Avoiding sustained-release BB (Toprol) due to patient's habit of chewing pills.     JEAN-PIERRE on CKD   -bladder scans  -Suspect cardiorenal syndrome. Initial Urine lytes both FeNa 0.4% and FeUrea 14% represent Pre-renal etiology in this case. -Continued diuresis per Cardiology's recommendations until appeared intravascularly dry.  -sCr and BUN have increased further; consulted Nephrology. Continuing diuresis, work up in progress.     Hx of VTE  -Coumadin 10mg   -subtherapeutic INR  -continue Therapeutic Lovenox      Anemia of iron deficiency  -normal ferritin but low serum iron and saturation   -Pt declines blood product transfusion, refusal signed  -repeat iron studies/retic - retic count low, serum and saturated iron very low. Oral iron supplementation started.      High Risk Conditions  Patient is  currently on drug therapy requiring intensive monitoring for toxicity: Warfarin     Diet:  Diet Cardiac [facility]; Fluid - 1500mL  GI Prophylaxis: Continued, chronic acid suppression therapy as OP  DVT Prophylaxis:     Anticoagulants   Medication Route Frequency    warfarin (COUMADIN) tablet 7.5 mg Oral Daily     Lines/ Drains/ Airways:  PIV  Urinary Catheter Indicated: Patient Does Not Have Urinary Catheter  Other Lines/Tubes/Drains:  Wounds:  Venous stasis ulcers    Goals of Care: Routine interventions  Discharge plan:  Skilled Nursing Facility    Oly Welsh MD  Department of Hospital Medicine  [id number]

## 2019-07-10 NOTE — HPI
Mr. Terrell is a 53M with HFrEF (4/2019 EF 23%, DD, PAP 47, CVP 15), HTN, AFib on coumadin, AFlutter s/p ablation, CVA 2009, CAD who presented on 7/2 with R shoulder/arm pain and ultimately admitted for acute on chronic combined heart failure. Patient reports that he is unable to lay flat at home and normally sleeps on 2 pillows. He is able to ambulate around his home without getting SOB, but does not leave home often. He does not use any walking aids like a walker or cane. He does have LE edema but has not personally noticed any worsening in edema from baseline. Denied excessive salt and fluid intake in daily diet.    On admit, he was found to have a BNP of 554 and CXR positive for congestion. He was given multiple doses of lasix and subsequently placed on lasix gtt until 7/9. He had had good UOP of 1-2.5L per day. Initially, JEAN-PIERRE was attributed to cardiorenal syndrome, but nephrology consulted on 7/10 due to worsening renal function.

## 2019-07-10 NOTE — SUBJECTIVE & OBJECTIVE
Past Medical History:   Diagnosis Date    Anticoagulant long-term use     Cardiomegaly     Chronic combined systolic and diastolic congestive heart failure     Coronary artery disease     Heart attack 2006    Hypertension     Hyperthyroidism, subclinical 1/2/2013    MI (myocardial infarction) 9/22/2013    MI in 2009      Paroxysmal atrial fibrillation     PE (pulmonary embolism) 1/1/2013    IN 2010     S/P ablation of atrial flutter 2008    Stroke 2009    no residual weaknesses       Past Surgical History:   Procedure Laterality Date    RADIOFREQUENCY ABLATION  01/08/2008    for atrial flutter       Review of patient's allergies indicates:   Allergen Reactions    Acetaminophen      Itching    Oxycodone-acetaminophen      Other reaction(s): Itching    Ace inhibitors Other (See Comments)     cough     Current Facility-Administered Medications   Medication Frequency    albuterol inhaler 1 puff Q6H PRN    aspirin EC tablet 81 mg Daily    calcium carbonate 200 mg calcium (500 mg) chewable tablet 500 mg TID PRN    diphenhydrAMINE capsule 25 mg Q6H PRN    enoxaparin injection 120 mg Q12H    ergocalciferol capsule 50,000 Units Q7 Days    ferrous sulfate EC tablet 325 mg Daily    furosemide tablet 80 mg BID    gabapentin 250 mg/5 mL (5 mL) solution 300 mg BID    isosorbide-hydrALAZINE 20-37.5 mg per tablet 1 tablet BID    lidocaine 5 % patch 3 patch Q24H    methocarbamol tablet 500 mg BID PC    methyl salicylate-menthol 15-10% cream TID    metoprolol tartrate (LOPRESSOR) tablet 25 mg Q6H    morphine 20 mg/mL concentrated syringe 10 mg Q4H PRN    nitroGLYCERIN SL tablet 0.4 mg Q5 Min PRN    ondansetron injection 4 mg Q12H PRN    pantoprazole EC tablet 40 mg Daily    polyethylene glycol packet 17 g BID    ramelteon tablet 8 mg Nightly PRN    senna-docusate 8.6-50 mg per tablet 1 tablet BID    sodium chloride 0.9% flush 10 mL PRN    sodium chloride 0.9% flush 10 mL PRN    warfarin  (COUMADIN) tablet 7.5 mg Daily     Family History     Problem Relation (Age of Onset)    Alcohol abuse Father    Hypertension Mother, Father, Sister, Brother    Stroke Mother        Tobacco Use    Smoking status: Never Smoker    Smokeless tobacco: Never Used   Substance and Sexual Activity    Alcohol use: No    Drug use: No    Sexual activity: Never     Review of Systems   Constitutional: Negative for activity change, chills and fever.   HENT: Negative for congestion, sinus pressure, sinus pain and sore throat.    Eyes: Negative for photophobia and pain.   Respiratory: Negative for cough, shortness of breath and wheezing.    Cardiovascular: Positive for leg swelling. Negative for chest pain.   Gastrointestinal: Negative for abdominal pain, blood in stool, constipation, diarrhea, nausea and vomiting.   Genitourinary: Negative for dysuria, hematuria and urgency.   Musculoskeletal: Positive for arthralgias and myalgias.   Neurological: Negative for dizziness, speech difficulty and light-headedness.   Psychiatric/Behavioral: Negative for agitation, behavioral problems, confusion and decreased concentration.     Objective:     Vital Signs (Most Recent):  Temp: 98.7 °F (37.1 °C) (07/10/19 0850)  Pulse: 71 (07/10/19 0850)  Resp: 18 (07/10/19 0850)  BP: 126/63 (07/10/19 0850)  SpO2: (!) 92 % (07/10/19 0850)  O2 Device (Oxygen Therapy): room air (07/10/19 0700) Vital Signs (24h Range):  Temp:  [96.7 °F (35.9 °C)-98.9 °F (37.2 °C)] 98.7 °F (37.1 °C)  Pulse:  [63-86] 71  Resp:  [18-20] 18  SpO2:  [92 %-99 %] 92 %  BP: (101-147)/(53-69) 126/63     Weight: 115.7 kg (255 lb 1.2 oz) (07/10/19 0400)  Body mass index is 37.67 kg/m².  Body surface area is 2.37 meters squared.    I/O last 3 completed shifts:  In: 1056 [P.O.:1016; I.V.:40]  Out: 1065 [Urine:1065]    Physical Exam   Constitutional: He is oriented to person, place, and time. He appears well-developed and well-nourished. No distress.   HENT:   Mouth/Throat: No  oropharyngeal exudate.   Eyes: Pupils are equal, round, and reactive to light. Conjunctivae are normal. No scleral icterus.   Neck: Normal range of motion. No thyromegaly present.   Cardiovascular: Normal heart sounds and intact distal pulses.   Heart rate irregularly irregular   Pulmonary/Chest: Effort normal. No respiratory distress. He has no wheezes. He has rales (bibasilar).   Abdominal: Soft. Bowel sounds are normal. He exhibits no distension. There is no tenderness. There is no guarding.   Musculoskeletal: He exhibits edema (2+ pitting BLE up to knees) and tenderness (Over R shoulder).   Lymphadenopathy:     He has no cervical adenopathy.   Neurological: He is alert and oriented to person, place, and time.   Skin: Skin is warm and dry. He is not diaphoretic.   Psychiatric: He has a normal mood and affect. His behavior is normal. Judgment and thought content normal.   Vitals reviewed.      Significant Labs:  CBC:   Recent Labs   Lab 07/08/19  0511   WBC 6.99   RBC 3.18*   HGB 7.5*   HCT 24.5*      MCV 77*   MCH 23.6*   MCHC 30.6*     CMP:   Recent Labs   Lab 07/10/19  0536   GLU 91   CALCIUM 10.4   ALBUMIN 3.3*   PROT 9.2*   *   K 4.1   CO2 28   CL 89*   BUN 71*   CREATININE 2.9*   ALKPHOS 193*   ALT 9*   AST 21   BILITOT 0.8     Recent Labs   Lab 07/05/19  2300   COLORU Straw   SPECGRAV 1.005   PHUR 5.0   PROTEINUA Negative   NITRITE Negative   LEUKOCYTESUR Negative     All labs within the past 24 hours have been reviewed.    Significant Imaging:    X-Ray Chest AP Portable  Narrative: EXAMINATION:  XR CHEST AP PORTABLE    CLINICAL HISTORY:  evaluate for pulmonary edema/congestion in patient with CHF exacerbation;    TECHNIQUE:  Single frontal view of the chest was performed.    COMPARISON:  No 07/04/2019 ne    FINDINGS:  Cardiomegaly and pulmonary vascular congestion.  Ill-defined opacities at the right lung base possible small airspace disease or atelectatic changes.  Slightly blunted  costophrenic angles suggests small amount of pleural effusion.  The upper lung fields are clear.  No significant changes  Impression: See above    Electronically signed by: Jeffrey Clark MD  Date:    07/08/2019  Time:    14:10

## 2019-07-10 NOTE — PLAN OF CARE
Patient resting quietly in recliner when CM rounded. No family present. CM informed the patient of recommendation for additional in-pt therapy following discharge. Patient stated that he would like to received additional in-pt therapy if covered by his insurance. Patient did not have a facility preference. PM&R consult ordered. Neph consulted for creatinine 2.9 this AM. Will continue to follow.

## 2019-07-10 NOTE — PLAN OF CARE
07/10/19 1439   Post-Acute Status   Post-Acute Authorization Placement   Post-Acute Placement Status Referrals Sent     Pt agreeable to [facility], referral placed.

## 2019-07-10 NOTE — PROGRESS NOTES
PHARMACY CONSULT NOTE: WARFARIN    Hamlet Terrell is a 53 y.o. male on warfarin therapy for Atrial Fibrillation. PharmD has been consulted for warfarin dosing.    Current order: warfarin 10 mg daily with lovenox bridge   Home dose: 6 mg daily   Coumadin clinic enrollment: Active  INR goal: 2-3    Lab Results   Component Value Date    INR 1.9 (H) 07/10/2019    INR 1.5 (H) 07/09/2019    INR 1.2 07/08/2019     Diet: Adult Cardiac    Recommendation(s):    Decrease warfarin from 10 mg to 7.5 mg daily and continue lovenox 1 mg/kg q12h bridge until INR > 2.0   Pharmacy will continue to follow and monitor warfarin    Thank you for the consult,  Bina Garcias, PharmD, BCPS  [id number]     **Note: Consults are reviewed Monday-Friday 7:00am-3:30pm. THE ABOVE RECOMMENDATIONS ARE ONLY SUGGESTED.THE RECOMMENDATIONS SHOULD BE CONSIDERED IN CONJUNCTION WITH ALL PATIENT FACTORS. **

## 2019-07-10 NOTE — PLAN OF CARE
07/10/19 0949   Post-Acute Status   Post-Acute Authorization Other   Other Status No Post-Acute Service Needs

## 2019-07-10 NOTE — ASSESSMENT & PLAN NOTE
BL sCr 1.3 last on 7/2 day of admit  sCr has been trending up to 2.9 today  Was on lasix pushes, then started on gtt until 7/9 with good UOP - net negative 5.5L total  Previously JEAN-PIERRE believed 2/2 cardiorenal syndrome as patient had worsening renal function in setting of heart failure exacerbation, but renal function continued to decline despite diuresis  Patient has been getting primarily morphine for pain relief throughout admission, started on gabapentin, and has been on therapeutic dose lovenox as a bridge for warfarin as he was found to have subtherapeutic INR  BP has been a bit labile with dips down to 80s/50s, but currently stable    JEAN-PIERRE likely to be multifactorial - Congestion from HF, multiple potentially nephrotoxic meds, hypotensive episodes.    Plan:  - INR today 1.9, would stop lovenox and titrate warfarin  - Previous notes indicate eliquis has been offered as an alternative form of AC; in this patient with questionable compliance with warfarin, maybe consider broaching topic again?  - Would decrease dose of gabapentin to maximum 300mg daily  - Stop morphine as pain control - liquid dilaudid would be better option in patient with poor renal function  - Increase diuresis to lasix 80mg IV bid  - Strict Is and Os and daily weights  - Avoid further nephrotoxic meds  - Will monitor renal function

## 2019-07-10 NOTE — PT/OT/SLP PROGRESS
Physical Therapy      Patient Name:  Hamlet Terrell   MRN:  [id number]    PT attempted to see pt. Pt asleep. Therapist unable to return in PM to see patent. Will attempt on next scheduled visit.     Chana Iyer, PT, DPT  7/10/2019

## 2019-07-11 LAB
ALBUMIN SERPL BCP-MCNC: 2.9 G/DL (ref 3.5–5.2)
ALBUMIN SERPL ELPH-MCNC: 3.59 G/DL (ref 3.35–5.55)
ALP SERPL-CCNC: 182 U/L (ref 55–135)
ALPHA1 GLOB SERPL ELPH-MCNC: 0.54 G/DL (ref 0.17–0.41)
ALPHA2 GLOB SERPL ELPH-MCNC: 1.02 G/DL (ref 0.43–0.99)
ALT SERPL W/O P-5'-P-CCNC: 7 U/L (ref 10–44)
ANION GAP SERPL CALC-SCNC: 15 MMOL/L (ref 8–16)
AST SERPL-CCNC: 18 U/L (ref 10–40)
B-GLOBULIN SERPL ELPH-MCNC: 1.55 G/DL (ref 0.5–1.1)
BASOPHILS # BLD AUTO: 0.03 K/UL (ref 0–0.2)
BASOPHILS NFR BLD: 0.5 % (ref 0–1.9)
BILIRUB SERPL-MCNC: 0.6 MG/DL (ref 0.1–1)
BILIRUB UR QL STRIP: NEGATIVE
BUN SERPL-MCNC: 78 MG/DL (ref 6–20)
CALCIUM SERPL-MCNC: 9.7 MG/DL (ref 8.7–10.5)
CHLORIDE SERPL-SCNC: 90 MMOL/L (ref 95–110)
CLARITY UR REFRACT.AUTO: CLEAR
CO2 SERPL-SCNC: 30 MMOL/L (ref 23–29)
COLOR UR AUTO: NORMAL
CREAT SERPL-MCNC: 2.7 MG/DL (ref 0.5–1.4)
DIFFERENTIAL METHOD: ABNORMAL
EOSINOPHIL # BLD AUTO: 0.3 K/UL (ref 0–0.5)
EOSINOPHIL NFR BLD: 4.6 % (ref 0–8)
ERYTHROCYTE [DISTWIDTH] IN BLOOD BY AUTOMATED COUNT: 16.4 % (ref 11.5–14.5)
EST. GFR  (AFRICAN AMERICAN): 30 ML/MIN/1.73 M^2
EST. GFR  (NON AFRICAN AMERICAN): 25.9 ML/MIN/1.73 M^2
GAMMA GLOB SERPL ELPH-MCNC: 1.89 G/DL (ref 0.67–1.58)
GLUCOSE SERPL-MCNC: 166 MG/DL (ref 70–110)
GLUCOSE UR QL STRIP: NEGATIVE
HCT VFR BLD AUTO: 22.3 % (ref 40–54)
HGB BLD-MCNC: 6.7 G/DL (ref 14–18)
HGB UR QL STRIP: NEGATIVE
IMM GRANULOCYTES # BLD AUTO: 0.03 K/UL (ref 0–0.04)
IMM GRANULOCYTES NFR BLD AUTO: 0.5 % (ref 0–0.5)
INR PPP: 2 (ref 0.8–1.2)
KETONES UR QL STRIP: NEGATIVE
LEUKOCYTE ESTERASE UR QL STRIP: NEGATIVE
LYMPHOCYTES # BLD AUTO: 0.9 K/UL (ref 1–4.8)
LYMPHOCYTES NFR BLD: 16.1 % (ref 18–48)
MAGNESIUM SERPL-MCNC: 2.4 MG/DL (ref 1.6–2.6)
MCH RBC QN AUTO: 23.9 PG (ref 27–31)
MCHC RBC AUTO-ENTMCNC: 30 G/DL (ref 32–36)
MCV RBC AUTO: 80 FL (ref 82–98)
MONOCYTES # BLD AUTO: 1.2 K/UL (ref 0.3–1)
MONOCYTES NFR BLD: 20.3 % (ref 4–15)
NEUTROPHILS # BLD AUTO: 3.3 K/UL (ref 1.8–7.7)
NEUTROPHILS NFR BLD: 58 % (ref 38–73)
NITRITE UR QL STRIP: NEGATIVE
NRBC BLD-RTO: 0 /100 WBC
PH UR STRIP: 6 [PH] (ref 5–8)
PLATELET # BLD AUTO: 265 K/UL (ref 150–350)
PMV BLD AUTO: 9.6 FL (ref 9.2–12.9)
POTASSIUM SERPL-SCNC: 3.5 MMOL/L (ref 3.5–5.1)
PROT SERPL-MCNC: 8.5 G/DL (ref 6–8.4)
PROT SERPL-MCNC: 8.6 G/DL (ref 6–8.4)
PROT UR QL STRIP: NEGATIVE
PROTHROMBIN TIME: 19.2 SEC (ref 9–12.5)
RBC # BLD AUTO: 2.8 M/UL (ref 4.6–6.2)
SODIUM SERPL-SCNC: 135 MMOL/L (ref 136–145)
SP GR UR STRIP: 1 (ref 1–1.03)
URN SPEC COLLECT METH UR: NORMAL
WBC # BLD AUTO: 5.66 K/UL (ref 3.9–12.7)

## 2019-07-11 PROCEDURE — 36415 COLL VENOUS BLD VENIPUNCTURE: CPT

## 2019-07-11 PROCEDURE — 99232 SBSQ HOSP IP/OBS MODERATE 35: CPT | Mod: ,,, | Performed by: INTERNAL MEDICINE

## 2019-07-11 PROCEDURE — 63600175 PHARM REV CODE 636 W HCPCS: Performed by: INTERNAL MEDICINE

## 2019-07-11 PROCEDURE — 97116 GAIT TRAINING THERAPY: CPT

## 2019-07-11 PROCEDURE — 20600001 HC STEP DOWN PRIVATE ROOM

## 2019-07-11 PROCEDURE — 25000003 PHARM REV CODE 250: Performed by: STUDENT IN AN ORGANIZED HEALTH CARE EDUCATION/TRAINING PROGRAM

## 2019-07-11 PROCEDURE — 99232 PR SUBSEQUENT HOSPITAL CARE,LEVL II: ICD-10-PCS | Mod: ,,, | Performed by: INTERNAL MEDICINE

## 2019-07-11 PROCEDURE — 83735 ASSAY OF MAGNESIUM: CPT

## 2019-07-11 PROCEDURE — 99222 1ST HOSP IP/OBS MODERATE 55: CPT | Mod: ,,, | Performed by: NURSE PRACTITIONER

## 2019-07-11 PROCEDURE — 97530 THERAPEUTIC ACTIVITIES: CPT

## 2019-07-11 PROCEDURE — 85025 COMPLETE CBC W/AUTO DIFF WBC: CPT

## 2019-07-11 PROCEDURE — 80053 COMPREHEN METABOLIC PANEL: CPT

## 2019-07-11 PROCEDURE — 99222 PR INITIAL HOSPITAL CARE,LEVL II: ICD-10-PCS | Mod: ,,, | Performed by: NURSE PRACTITIONER

## 2019-07-11 PROCEDURE — 97110 THERAPEUTIC EXERCISES: CPT

## 2019-07-11 PROCEDURE — 81003 URINALYSIS AUTO W/O SCOPE: CPT

## 2019-07-11 PROCEDURE — 25000003 PHARM REV CODE 250: Performed by: PHYSICIAN ASSISTANT

## 2019-07-11 PROCEDURE — 85610 PROTHROMBIN TIME: CPT

## 2019-07-11 PROCEDURE — 25000003 PHARM REV CODE 250: Performed by: INTERNAL MEDICINE

## 2019-07-11 PROCEDURE — 25000003 PHARM REV CODE 250: Performed by: HOSPITALIST

## 2019-07-11 RX ORDER — FUROSEMIDE 80 MG/1
80 TABLET ORAL 2 TIMES DAILY
Status: DISCONTINUED | OUTPATIENT
Start: 2019-07-11 | End: 2019-07-18 | Stop reason: HOSPADM

## 2019-07-11 RX ADMIN — METOPROLOL TARTRATE 25 MG: 25 TABLET, FILM COATED ORAL at 05:07

## 2019-07-11 RX ADMIN — SENNOSIDES,DOCUSATE SODIUM 1 TABLET: 8.6; 5 TABLET, FILM COATED ORAL at 09:07

## 2019-07-11 RX ADMIN — HYDROMORPHONE HYDROCHLORIDE 1 MG: 1 SOLUTION ORAL at 06:07

## 2019-07-11 RX ADMIN — METOPROLOL TARTRATE 25 MG: 25 TABLET, FILM COATED ORAL at 07:07

## 2019-07-11 RX ADMIN — WARFARIN SODIUM 7.5 MG: 7.5 TABLET ORAL at 04:07

## 2019-07-11 RX ADMIN — METHOCARBAMOL TABLETS 500 MG: 500 TABLET, COATED ORAL at 09:07

## 2019-07-11 RX ADMIN — METOPROLOL TARTRATE 25 MG: 25 TABLET, FILM COATED ORAL at 11:07

## 2019-07-11 RX ADMIN — MENTHOL, METHYL SALICYLATE: 10; 15 CREAM TOPICAL at 03:07

## 2019-07-11 RX ADMIN — HYDRALAZINE HYDROCHLORIDE AND ISOSORBIDE DINITRATE 1 TABLET: 37.5; 2 TABLET, FILM COATED ORAL at 09:07

## 2019-07-11 RX ADMIN — POLYETHYLENE GLYCOL 3350 17 G: 17 POWDER, FOR SOLUTION ORAL at 09:07

## 2019-07-11 RX ADMIN — METOPROLOL TARTRATE 25 MG: 25 TABLET, FILM COATED ORAL at 12:07

## 2019-07-11 RX ADMIN — METHOCARBAMOL TABLETS 500 MG: 500 TABLET, COATED ORAL at 08:07

## 2019-07-11 RX ADMIN — ASPIRIN 81 MG: 81 TABLET, COATED ORAL at 09:07

## 2019-07-11 RX ADMIN — HYDRALAZINE HYDROCHLORIDE AND ISOSORBIDE DINITRATE 1 TABLET: 37.5; 2 TABLET, FILM COATED ORAL at 08:07

## 2019-07-11 RX ADMIN — PANTOPRAZOLE SODIUM 40 MG: 40 TABLET, DELAYED RELEASE ORAL at 09:07

## 2019-07-11 RX ADMIN — FUROSEMIDE 80 MG: 80 TABLET ORAL at 07:07

## 2019-07-11 RX ADMIN — FUROSEMIDE 80 MG: 10 INJECTION, SOLUTION INTRAMUSCULAR; INTRAVENOUS at 09:07

## 2019-07-11 RX ADMIN — GABAPENTIN 100 MG: 250 SOLUTION ORAL at 08:07

## 2019-07-11 RX ADMIN — FERROUS SULFATE TAB EC 325 MG (65 MG FE EQUIVALENT) 325 MG: 325 (65 FE) TABLET DELAYED RESPONSE at 09:07

## 2019-07-11 RX ADMIN — RAMELTEON 8 MG: 8 TABLET, FILM COATED ORAL at 08:07

## 2019-07-11 RX ADMIN — GABAPENTIN 100 MG: 250 SOLUTION ORAL at 09:07

## 2019-07-11 RX ADMIN — MENTHOL, METHYL SALICYLATE: 10; 15 CREAM TOPICAL at 09:07

## 2019-07-11 NOTE — HOSPITAL COURSE
7/8/19: Participated w/ PT & OT. Sit tos tand modA w/ RW. Ambulated 3 ft CGA- TotalA w/ RW. Grooming CGA. LBD totalA.   7/9/19: Participated w/ PT. Sit to stand modA w/ RW.   07/11/2019: Sit to stand Min x 2 ppl -CGA.  Ambulated 6 ft x 2 trials MaxA.  UBD and LBD Becky.  7/12/19: Patient refused OT & PT 2/2 R hand edema.   7/15/19: Participated w/ PT & OT. Sit to stand SBA. Ambulated 17 ft HHA- Becky x 2 ppl. Feeding mod(I). Pt declined UB dressing and grooming in bathroom.   7/16/19: No PT or OT  7/17/19:  Participated w/ PT & OT. Sit to stand CGA w/ RW. Ambulated 37 ft CGA- Becky x 2w/ RW. UBD Becky. Toileting (I).

## 2019-07-11 NOTE — HPI
Hamlet Terrell is a 53-year-old male with PMHx of HTN, chronic systolic and diastolic heart failure, CVA, CAD, and MI. [place] [facility] [place] R shoulder pain and was admitted for acute on chronic HR. On arrival, found to have BNP of 554 and CXR positive for congestion. Etiology for R shoulder pain and weakness remains unclear. S/p Right shoulder arthrocentesis - gram stain negative, culture Aerobic with No Growth. Hospital course complicated by fever (blood cxs negative, C-xray w/o signs of new infiltrate, abx d/c'd, fever resolved), and JEAN-PIERRE (nephrology was consulted and recommending increase Lasix and avoid nephrotoxic meds).     Functional History: Patient lives alone in [place] with 3 ANAID and no handrails. Prior to admission, patient's level of function was (I).

## 2019-07-11 NOTE — PROGRESS NOTES
"Hospital Medicine  Progress Note    Team: [facility] MED D Oly Welsh MD  Admit Date: 7/2/2019  DELMI 7/10/2019  Length of Stay:  LOS: 7 days   Code status: Full Code    Principal Problem:  Acute on chronic combined systolic and diastolic heart failure    HPI:  "54 yo M with combined CHF (Echo 4/2019 with EF 23%, diastolic dysfuction, moderated MR and TR), HTN, chronic A-fib (on Coumadin), A-flutter s/p ablation, CVA (in 2009), CAD, ?PE (patient reported), non-compliance, and drug seeking behavior presented right shoulder/arm pain following a fall injury associated with right leg weakness. Patient reports waking up to go to the bathroom during the night approximately 4 days ago when he was unable to bare weight on right leg due to weakness. He attempted to get back in the bed and aggravated/injured his right shoulder climbing back into bed. He was hoping the pain would resolve on its own but it has persisted for past 4 days prompting him to come to ED. He reports he can move his right upper extremity but refuses to because he does not want to illicit the pain."    Interval history: Patient complains of persistent R shoulder pain and now RUE tremor    Review of Systems   Constitutional: Negative for fever.   Cardiovascular: Negative for chest pain.   Musculoskeletal: Positive for joint pain.       Physical Exam  Physical Exam   Constitutional:  Non-toxic appearance. No distress.   Eyes: Conjunctivae and lids are normal.   Neck: JVD present.   Cardiovascular: S1 normal and S2 normal.   Pulmonary/Chest: Effort normal. He has rales in the right lower field and the left lower field.   Abdominal: Soft. Normal appearance and bowel sounds are normal. There is no tenderness.   Musculoskeletal: He exhibits edema.        Right shoulder: He exhibits decreased range of motion, tenderness and pain.   Neurological: He is alert. He is not disoriented.       Temp:  [97.8 °F (36.6 °C)-98.6 °F (37 °C)]   Pulse:  [65-82]   Resp:  " [16-18]   BP: ()/(55-72)   SpO2:  [92 %-96 %]     Intake/Output Summary (Last 24 hours) at 7/11/2019 1338  Last data filed at 7/11/2019 0500  Gross per 24 hour   Intake 180 ml   Output 1100 ml   Net -920 ml       Recent Labs   Lab 07/07/19  0638 07/08/19  0511 07/11/19  0341   WBC 7.20 6.99 5.66   HGB 7.1* 7.5* 6.7*   HCT 23.9* 24.5* 22.3*    315 265     Recent Labs   Lab 07/09/19  0407 07/10/19  0536 07/11/19  0341   * 134* 135*   K 4.2 4.1 3.5   CL 89* 89* 90*   CO2 31* 28 30*   BUN 60* 71* 78*   CREATININE 2.5* 2.9* 2.7*   * 91 166*   CALCIUM 10.0 10.4 9.7   MG 2.2 2.3 2.4     Recent Labs   Lab 07/09/19  0407 07/10/19  0536 07/11/19  0341   ALKPHOS 204* 193* 182*   ALT 8* 9* 7*   AST 23 21 18   ALBUMIN 3.4* 3.3* 2.9*   PROT 9.3* 9.2* 8.5*   BILITOT 0.9 0.8 0.6   INR 1.5* 1.9* 2.0*      Recent Labs     07/09/19  0407   *     Recent Labs   Lab 07/02/19  2215   HGBA1C 6.0*       Scheduled Meds:   aspirin  81 mg Oral Daily    ergocalciferol  50,000 Units Oral Q7 Days    ferrous sulfate  325 mg Oral Daily    furosemide  80 mg Oral BID    gabapentin  100 mg Oral BID    isosorbide-hydrALAZINE 20-37.5 mg  1 tablet Oral BID    lidocaine  3 patch Transdermal Q24H    methocarbamol  500 mg Oral BID PC    methyl salicylate-menthol 15-10%   Topical (Top) TID    metoprolol tartrate  25 mg Oral Q6H    pantoprazole  40 mg Oral Daily    polyethylene glycol  17 g Oral BID    senna-docusate 8.6-50 mg  1 tablet Oral BID    warfarin  7.5 mg Oral Daily     Continuous Infusions:    As Needed:  albuterol, calcium carbonate, diphenhydrAMINE, HYDROmorphone, nitroGLYCERIN, ondansetron, ramelteon, sodium chloride 0.9%, sodium chloride 0.9%    Active Hospital Problems    Diagnosis  POA    *Acute on chronic combined systolic and diastolic heart failure [I50.43]  Yes    Atrial fibrillation, chronic [I48.2]  Yes     Priority: Low     Chronic    Essential hypertension [I10]  Yes     Priority: Low      Chronic    Dysphagia for pills, idiopathic [R13.10]  Yes     Patient chews pills and opens capsules -- avoid sustained-release formulations.      Shoulder pain, right [M25.511]  Yes    Weakness of lower extremity [R29.898]  Yes    Acute pain of right shoulder due to trauma [M25.511, G89.11]  Yes    Neuropathy of both feet [G57.93]  Yes    Prediabetes [R73.03]  Yes    Venous stasis of lower extremity [I87.8]  Yes    Impaired mobility [Z74.09]  Yes    Elevated alkaline phosphatase level [R74.8]  Yes    JEAN-PIERRE (acute kidney injury) [N17.9]  Yes    Chronic kidney disease [N18.9]  Yes     Chronic    Lumbar back pain [M54.5]  Yes    Personal history of venous thrombosis and embolism [Z86.718]  Not Applicable    Chronic anticoagulation [Z79.01]  Not Applicable     Chronic    Non compliance w medication regimen [Z91.14]  Not Applicable     Chronic    History of CVA (cerebrovascular accident) [Z86.73]  Not Applicable     Personal account, occurred in 2008/2009 at [facility].        Resolved Hospital Problems    Diagnosis Date Resolved POA    Febrile illness, acute [R50.9] 07/09/2019 Yes       Overview of Hospital Course:  54 yo M with combined CHF (Echo 4/2019 with EF 23%, diastolic dysfuction, moderated MR and TR), HTN, chronic A-fib (on Coumadin), A-flutter s/p ablation, CVA (in 2009), CAD, ?PE (patient reported), non-compliance presenting with musculoskeletal complaints and weakness admitted for acute on chronic combined heart failure and JEAN-PIERRE.    Assessment and Plan:    Acute on Chronic Combined systolic/diastolic heart failure   -Cardiology consulted, appreciate assistance  -continued Lasix infusion until 7/8  -Repeat ECHO ordered; cancelled by Cardiology  -Converted diuresis to home oral dose 7/9 as per Cardiology's recommendations.  -sCr increased; continued more aggressive diuresis.     Right sided weakness & acute pain   -IV opiates discontinued, on oral morphine PRN, changed to liquid due to  history of chewing pills.  -Gabapentin renal-dose ordered, initially refusing but now taking, titrate dose as tolerated.    -etiology for pian and weakness remains unclear, s/p MRI Brain/C/T/L spine  -Patient has chronic elevation of Uric Acid, prior synovial fluid studies negative for gout/pseudogout crystals > trial of empiric Colchicine 0.6mg X 1 did not improve symptoms.   -s/p Right shoulder arthrocentesis - gram stain negative, culture Aerobic with No Growth  -Neurology consulted - evaluated, concern for non-pathologic pattern/etiology of pain and weakness    -Continue OT/PT; plan for SNF     Febrile Illness - resolved  -febrile a few hours after admission on 7/3 - 7/4 . Afebrile since.   -Procal elevated, blood cultures negative  -initial UA w/o signs of infection  -CXR w/o signs of new infiltrate  -Empiric Vanc & Zosyn discontinued  -synovial fluid cultures show no growth so far     Atrial Fibrillation   -Beta-blocker changed to Lopressor 25mg q6hrs.   -Therapeutic Lovenox with INR low  -warfarin at 10mg dosing, PharmD consulted..   -Avoiding sustained-release BB (Toprol) due to patient's habit of chewing pills.     JEAN-PIERRE on CKD   -bladder scans  -Suspect cardiorenal syndrome. Initial Urine lytes both FeNa 0.4% and FeUrea 14% represent Pre-renal etiology in this case.   -Continued diuresis per Cardiology's recommendations until appeared intravascularly dry.  -sCr and BUN have increased further; consulted Nephrology. Continuing diuresis, work up in progress.     Hx of VTE  -Coumadin 10mg   -subtherapeutic INR  -continued Therapeutic Lovenox until INR increased; now discontinued.  -PharmD consulted for Coumadin management.     Anemia of iron deficiency  -normal ferritin but low serum iron and saturation   -Pt declines blood product transfusion, refusal signed  -repeat iron studies/retic - retic count low, serum and saturated iron very low. Oral iron supplementation started.  -repeat H&H ordered but not  done      High Risk Conditions  Patient is currently on drug therapy requiring intensive monitoring for toxicity: Warfarin     Diet:  Diet Cardiac [facility]; Fluid - 1500mL  GI Prophylaxis: Continued, chronic acid suppression therapy as OP  DVT Prophylaxis:     Anticoagulants   Medication Route Frequency    warfarin (COUMADIN) tablet 7.5 mg Oral Daily     Lines/ Drains/ Airways:  PIV  Urinary Catheter Indicated: Patient Does Not Have Urinary Catheter  Other Lines/Tubes/Drains:  Wounds:  Venous stasis ulcers    Goals of Care: Routine interventions  Discharge plan:  Skilled Nursing Facility    Oly Welsh MD  Department of Hospital Medicine  [id number]

## 2019-07-11 NOTE — PLAN OF CARE

## 2019-07-11 NOTE — PLAN OF CARE
Problem: Adult Inpatient Plan of Care  Goal: Plan of Care Review  Outcome: Ongoing (interventions implemented as appropriate)  Pt free of falls/trauma/injuries.  Denies c/o SOB, CP, or discomfort.  Generalized skin remains CDI; moderated generalized  edema noted.  Pt conitnues being diuresed with lasix 80 ivp mg ; diuresing well.Pt continues to c/o pain to left shoulder and is receiving lidocaine patches which he refused today as  hydromorphine 1mg/ml for pain to help with the pain.Neurology consulted and is following pt.  Pt tolerating plan of care

## 2019-07-11 NOTE — PT/OT/SLP PROGRESS
Occupational Therapy   Treatment    Name: Hamlet Terrell  MRN: [id number]  Admitting Diagnosis:  Acute on chronic combined systolic and diastolic heart failure       Recommendations:     Discharge Recommendations: nursing facility, skilled  Discharge Equipment Recommendations:  walker, rolling, commode(bedside commode)  Barriers to discharge:  Decreased caregiver support    Assessment:     Hamlet Terrell is a 53 y.o. male with a medical diagnosis of Acute on chronic combined systolic and diastolic heart failure.  He presents with the following: performance deficits affecting function are weakness, impaired functional mobilty, impaired self care skills, gait instability, impaired balance, impaired endurance, decreased upper extremity function, decreased lower extremity function, pain, decreased ROM, decreased safety awareness. Pt participated and tolerated therapy session on this day better than previous sessions. Pt made some progress but still expressed concern for returning home alone in this condition. Pt would benefit from continued acute OT services to improve overall occupational functioning.     Rehab Prognosis:  Fair; patient would benefit from acute skilled OT services to address these deficits and reach maximum level of function.       Plan:     Patient to be seen 3 x/week to address the above listed problems via self-care/home management, therapeutic activities, therapeutic exercises  · Plan of Care Expires: 08/07/19  · Plan of Care Reviewed with: patient    Subjective     Pain/Comfort:  Pain Rating 1: 10/10  Location - Side 1: Right  Location - Orientation 1: generalized  Location 1: shoulder  Pain Addressed 1: Distraction, Reposition  Pain Rating Post-Intervention 1: 10/10    Objective:     Communicated with: RN prior to session.  Patient found up in chair with telemetry upon OT entry to room.    General Precautions: Standard, fall   Orthopedic Precautions:N/A   Braces: N/A     Occupational  Performance:      Functional Mobility/Transfers:  · Pt performed sit <> stand x2 from bedside chair using RW.   · [place] 1: Min A 2x persons  · [place] 2: CGA  · Functional Mobility: Pt ambulated a total of ~ 6 feet with max A and RW. Pt took ~ 4 steps forward, rest break in chair, and another 4 steps forward.  Pt required max cueing for WS, foot sequence, RW management and manual  facilitation to advance R foot forward.     Activities of Daily Living:  · Upper Body Dressing: minimum assistance to don gown seated in chair.   · Lower Body Dressing: minimum assistance to don L sock. Pt able to indep doff L sock sitting edge of chair. Pt would not attempt R sock.       [facility] 6 Click ADL: 16    Treatment & Education:  Pt performed AROM and AAROM of R shldr, elbow, and digits. Pt reported 10/10 pain with movement but was cooperative and allowed assistance to further advance UE ROM.     Pt edu on POC/role of OT  Pt edu on importance of OOB activity daily and AROM and/or self PROM of R UE and R LE to increase ROM and function.     Patient left up in chair with call button in reachEducation:      GOALS:   Multidisciplinary Problems     Occupational Therapy Goals        Problem: Occupational Therapy Goal    Goal Priority Disciplines Outcome Interventions   Occupational Therapy Goal     OT, PT/OT Ongoing (interventions implemented as appropriate)    Description:  Goals to be met by: 7/15/19     Patient will increase functional independence with ADLs by performing:    UE Dressing with Moderate Assistance.  LE Dressing with Maximum Assistance.  Grooming while seated with Minimal Assistance.  Toileting from bedside commode with Maximum Assistance for hygiene and clothing management.   Rolling to Bilateral with Maximum Assistance.   Supine to sit with Maximum Assistance.  Stand pivot transfers with Maximum Assistance.  Step transfer with Maximum Assistance  Toilet transfer to bedside commode with Maximum Assistance.                       Time Tracking:     OT Date of Treatment: 07/11/19  OT Start Time: 1334  OT Stop Time: 1404  OT Total Time (min): 30 min    Billable Minutes:Therapeutic Activity 15  Therapeutic Exercise 15    VICKY Johnson  7/11/2019

## 2019-07-11 NOTE — PROGRESS NOTES
[facility]  Nephrology  Progress Note    Patient Name: Hamlet Terrell  MRN: [id number]  Admission Date: 7/2/2019  Hospital Length of Stay: 7 days  Attending Provider: Oly Welsh MD   Primary Care Physician: Carlin Swift MD  Principal Problem:Acute on chronic combined systolic and diastolic heart failure    Subjective:     HPI: Mr. Terrell is a 53M with HFrEF (4/2019 EF 23%, DD, PAP 47, CVP 15), HTN, AFib on coumadin, AFlutter s/p ablation, CVA 2009, CAD who presented on 7/2 with R shoulder/arm pain and ultimately admitted for acute on chronic combined heart failure. Patient reports that he is unable to lay flat at home and normally sleeps on 2 pillows. He is able to ambulate around his home without getting SOB, but does not leave home often. He does not use any walking aids like a walker or cane. He does have LE edema but has not personally noticed any worsening in edema from baseline. Denied excessive salt and fluid intake in daily diet.    On admit, he was found to have a BNP of 554 and CXR positive for congestion. He was given multiple doses of lasix and subsequently placed on lasix gtt until 7/9. He had had good UOP of 1-2.5L per day. Initially, JEAN-PIERRE was attributed to cardiorenal syndrome, but nephrology consulted on 7/10 due to worsening renal function.    Interval History: Patient stable from yesterday, UOP of 1.1L. Renal function improved slightly. No acute events overnight. No difficulty breathing.    Review of patient's allergies indicates:   Allergen Reactions    Acetaminophen      Itching    Oxycodone-acetaminophen      Other reaction(s): Itching    Ace inhibitors Other (See Comments)     cough     Current Facility-Administered Medications   Medication Frequency    albuterol inhaler 1 puff Q6H PRN    aspirin EC tablet 81 mg Daily    calcium carbonate 200 mg calcium (500 mg) chewable tablet 500 mg TID PRN    diphenhydrAMINE capsule 25 mg Q6H PRN    ergocalciferol capsule  50,000 Units Q7 Days    ferrous sulfate EC tablet 325 mg Daily    furosemide injection 80 mg BID    gabapentin 250 mg/5 mL (5 mL) solution 100 mg BID    HYDROmorphone 1 mg/mL oral liquid 1 mg Q4H PRN    isosorbide-hydrALAZINE 20-37.5 mg per tablet 1 tablet BID    lidocaine 5 % patch 3 patch Q24H    methocarbamol tablet 500 mg BID PC    methyl salicylate-menthol 15-10% cream TID    metoprolol tartrate (LOPRESSOR) tablet 25 mg Q6H    nitroGLYCERIN SL tablet 0.4 mg Q5 Min PRN    ondansetron injection 4 mg Q12H PRN    pantoprazole EC tablet 40 mg Daily    polyethylene glycol packet 17 g BID    ramelteon tablet 8 mg Nightly PRN    senna-docusate 8.6-50 mg per tablet 1 tablet BID    sodium chloride 0.9% flush 10 mL PRN    sodium chloride 0.9% flush 10 mL PRN    warfarin (COUMADIN) tablet 7.5 mg Daily       Objective:     Vital Signs (Most Recent):  Temp: 98.3 °F (36.8 °C) (07/11/19 1552)  Pulse: 77 (07/11/19 1552)  Resp: 16 (07/11/19 1552)  BP: (!) 108/57 (07/11/19 1552)  SpO2: (!) 93 % (07/11/19 1552)  O2 Device (Oxygen Therapy): room air (07/11/19 0500) Vital Signs (24h Range):  Temp:  [97.8 °F (36.6 °C)-98.6 °F (37 °C)] 98.3 °F (36.8 °C)  Pulse:  [65-82] 77  Resp:  [16-18] 16  SpO2:  [92 %-94 %] 93 %  BP: ()/(55-69) 108/57     Weight: 115 kg (253 lb 8.5 oz) (07/11/19 0500)  Body mass index is 37.44 kg/m².  Body surface area is 2.37 meters squared.    I/O last 3 completed shifts:  In: 900 [P.O.:900]  Out: 1640 [Urine:1640]    Physical Exam   Constitutional: He is oriented to person, place, and time. He appears well-developed and well-nourished. No distress.   HENT:   Mouth/Throat: No oropharyngeal exudate.   Eyes: Pupils are equal, round, and reactive to light. Conjunctivae are normal. No scleral icterus.   Neck: Normal range of motion. No thyromegaly present.   Cardiovascular: Normal heart sounds and intact distal pulses.   Heart rate irregularly irregular   Pulmonary/Chest: Effort normal. No  respiratory distress. He has no wheezes. He has rales (bibasilar).   Abdominal: Soft. Bowel sounds are normal. He exhibits no distension. There is no tenderness. There is no guarding.   Musculoskeletal: He exhibits edema (2+ pitting BLE up to knees) and tenderness (Over R shoulder).   Lymphadenopathy:     He has no cervical adenopathy.   Neurological: He is alert and oriented to person, place, and time.   Skin: Skin is warm and dry. He is not diaphoretic.   Psychiatric: He has a normal mood and affect. His behavior is normal. Judgment and thought content normal.   Vitals reviewed.      Significant Labs:  CBC:   Recent Labs   Lab 07/11/19  0341   WBC 5.66   RBC 2.80*   HGB 6.7*   HCT 22.3*      MCV 80*   MCH 23.9*   MCHC 30.0*     CMP:   Recent Labs   Lab 07/11/19  0341   *   CALCIUM 9.7   ALBUMIN 2.9*   PROT 8.5*   *   K 3.5   CO2 30*   CL 90*   BUN 78*   CREATININE 2.7*   ALKPHOS 182*   ALT 7*   AST 18   BILITOT 0.6     All labs within the past 24 hours have been reviewed.     Significant Imaging:    X-Ray Chest AP Portable  Narrative: EXAMINATION:  XR CHEST AP PORTABLE    CLINICAL HISTORY:  evaluate for pulmonary edema/congestion in patient with CHF exacerbation;    TECHNIQUE:  Single frontal view of the chest was performed.    COMPARISON:  No 07/04/2019 ne    FINDINGS:  Cardiomegaly and pulmonary vascular congestion.  Ill-defined opacities at the right lung base possible small airspace disease or atelectatic changes.  Slightly blunted costophrenic angles suggests small amount of pleural effusion.  The upper lung fields are clear.  No significant changes  Impression: See above    Electronically signed by: Jeffrey Clark MD  Date:    07/08/2019  Time:    14:10          Assessment/Plan:     JEAN-PIERRE (acute kidney injury)  BL sCr 1.3 last on 7/2 day of admit  sCr has been trending up to 2.9 today  Was on lasix pushes, then started on gtt until 7/9 with good UOP - net negative 5.5L total  Previously JEAN-PIERRE  believed 2/2 cardiorenal syndrome as patient had worsening renal function in setting of heart failure exacerbation, but renal function continued to decline despite diuresis  Patient has been getting primarily morphine for pain relief throughout admission, started on gabapentin, and has been on therapeutic dose lovenox as a bridge for warfarin as he was found to have subtherapeutic INR  BP has been a bit labile with dips down to 80s/50s, but currently stable    JEAN-PIERRE likely to be multifactorial - Congestion from HF, multiple potentially nephrotoxic meds, hypotensive episodes. In setting of hypercalcemia, hypoalbuminemia, and orthopedic pain, will also pursue multiple myeloma workup to rule out this malignancy as a contributing factor. SPEP previously done by primary team showed mild elevation at 8.6.    Plan:  - Follow-up UPEP, KIANNA, serum free light chains, CXR, US RP, VBG  - Previous notes indicate eliquis has been offered as an alternative form of AC; in this patient with questionable compliance with warfarin, maybe consider broaching topic again?  - Renally dose meds - maximum 300mg dose of gabapentin  - Liquid dilaudid for pain control  - Would decrease diuretics in this patient - lasix 80mg po bid  - Recommend holding antihypertensives if SBP <120 to avoid further hypotensive injury  - Orthostatic vitals qshift  - Strict Is and Os and daily weights  - Avoid further nephrotoxic meds  - Will monitor renal function        Thank you for your consult. I will follow-up with patient. Please contact us if you have any additional questions.    Brooke Mao MD  Nephrology  [facility]

## 2019-07-11 NOTE — ASSESSMENT & PLAN NOTE
BL sCr 1.3 last on 7/2 day of admit  sCr has been trending up to 2.9 today  Was on lasix pushes, then started on gtt until 7/9 with good UOP - net negative 5.5L total  Previously JEAN-PIERRE believed 2/2 cardiorenal syndrome as patient had worsening renal function in setting of heart failure exacerbation, but renal function continued to decline despite diuresis  Patient has been getting primarily morphine for pain relief throughout admission, started on gabapentin, and has been on therapeutic dose lovenox as a bridge for warfarin as he was found to have subtherapeutic INR  BP has been a bit labile with dips down to 80s/50s, but currently stable    JEAN-PIERRE likely to be multifactorial - Congestion from HF, multiple potentially nephrotoxic meds, hypotensive episodes. In setting of hypercalcemia, hypoalbuminemia, and orthopedic pain, will also pursue multiple myeloma workup to rule out this malignancy as a contributing factor. SPEP previously done by primary team showed mild elevation at 8.6.    Plan:  - Follow-up UPEP, KIANNA, serum free light chains, CXR, US RP, VBG  - Previous notes indicate eliquis has been offered as an alternative form of AC; in this patient with questionable compliance with warfarin, maybe consider broaching topic again?  - Renally dose meds - maximum 300mg dose of gabapentin  - Liquid dilaudid for pain control  - Would decrease diuretics in this patient - lasix 80mg po bid  - Recommend holding antihypertensives if SBP <120 to avoid further hypotensive injury  - Orthostatic vitals qshift  - Strict Is and Os and daily weights  - Avoid further nephrotoxic meds  - Will monitor renal function

## 2019-07-11 NOTE — CONSULTS
Inpatient consult to Physical Medicine Rehab  Consult performed by: Mary Buitrago NP  Consult ordered by: Oly Welsh MD  Reason for consult: Debility       Reviewed patient history and current admission.  Rehab team following.  Full consult to follow.    DORCAS Burger, FNP-C  Physical Medicine & Rehabilitation   07/11/2019  Spectralink: [phone]

## 2019-07-11 NOTE — PT/OT/SLP PROGRESS
Physical Therapy Treatment    Patient Name:  Hamlet Terrell   MRN:  [id number]    Recommendations:     Discharge Recommendations:  nursing facility, skilled   Discharge Equipment Recommendations: walker, rolling   Barriers to discharge: Decreased caregiver support    Assessment:     Hamlet Terrell is a 53 y.o. male admitted with a medical diagnosis of Acute on chronic combined systolic and diastolic heart failure.  He presents with the following impairments/functional limitations:  weakness, impaired functional mobilty, gait instability, impaired balance, impaired endurance, decreased lower extremity function, decreased upper extremity function, impaired cardiopulmonary response to activity, decreased safety awareness.    Rehab Prognosis: Fair; patient would benefit from acute skilled PT services to address these deficits and reach maximum level of function.    Recent Surgery: * No surgery found *      Plan:     During this hospitalization, patient to be seen 4 x/week to address the identified rehab impairments via gait training, therapeutic activities, therapeutic exercises, neuromuscular re-education and progress toward the following goals:    · Plan of Care Expires:  08/03/19    Subjective     Chief Complaint: not being able to move R LE and R UE (presentation does not correspond with performance)  Patient/Family Comments/goals: to return home   Pain/Comfort:  · Pain Rating 1: 10/10(R shoulder)  · Pain Addressed 1: Distraction, Reposition  · Pain Rating Post-Intervention 1: 10/10      Objective:     Communicated with RN prior to session.  Patient found up in chair with telemetry upon PT entry to room.     General Precautions: Standard, fall   Orthopedic Precautions:N/A   Braces: N/A     Functional Mobility:  · Bed Mobility: NT 2nd to pt found in chair   · Transfers:  Sit to Stand:  · Trial 1: min A x 2 persons  · Trial 2: CGA  · Gait: 6ft x 2 trials with max A; max cuing and facilitation of RW management, B weight  shifting, B LE advancement; seated rest break inbetween trials       AM-PAC 6 CLICK MOBILITY  Turning over in bed (including adjusting bedclothes, sheets and blankets)?: 3  Sitting down on and standing up from a chair with arms (e.g., wheelchair, bedside commode, etc.): 3  Moving from lying on back to sitting on the side of the bed?: 2  Moving to and from a bed to a chair (including a wheelchair)?: 2  Need to walk in hospital room?: 2  Climbing 3-5 steps with a railing?: 1  Basic Mobility Total Score: 13       Therapeutic Activities and Exercises:  Educated pt on PT role/POC  Educated pt on importance of OOB activity and daily ambulation  Pt verbalized understanding     Took pt's R LE though full knee flexion/extensino and hip flexion/extension x 10 trials; pt with increased participation throughout the trial     Patient left up in chair with all lines intact, call button in reach and RN notified..    GOALS:   Multidisciplinary Problems     Physical Therapy Goals        Problem: Physical Therapy Goal    Goal Priority Disciplines Outcome Goal Variances Interventions   Physical Therapy Goal     PT, PT/OT Ongoing (interventions implemented as appropriate)     Description:  Goals to be met by: 2019     Patient will increase functional independence with mobility by performin. Supine to sit with Set-up [place] -not met  2. Sit to supine with Set-up [place] -not met  3. Sit to stand transfer with Stand-by Assistance -not met  4. Bed to chair transfer with Stand-by Assistance using Rolling Walker -not met  5. Gait  x 30 feet with Contact Guard Assistance using Rolling Walker. -not met  6. Lower extremity exercise program x20 reps per handout, with assistance as needed -not met                       Time Tracking:     PT Received On: 19  PT Start Time: 1334     PT Stop Time: 1355  PT Total Time (min): 21 min     Billable Minutes: Gait Training 12    Treatment Type: Treatment  PT/PTA: PT     PTA  Visit Number: 0       Stacia Shukla PT, DPT  7/11/2019  [phone]

## 2019-07-11 NOTE — SUBJECTIVE & OBJECTIVE
Past Medical History:   Diagnosis Date    Anticoagulant long-term use     Cardiomegaly     Chronic combined systolic and diastolic congestive heart failure     Coronary artery disease     Heart attack 2006    Hypertension     Hyperthyroidism, subclinical 1/2/2013    MI (myocardial infarction) 9/22/2013    MI in 2009      Paroxysmal atrial fibrillation     PE (pulmonary embolism) 1/1/2013    IN 2010     S/P ablation of atrial flutter 2008    Stroke 2009    no residual weaknesses     Past Surgical History:   Procedure Laterality Date    RADIOFREQUENCY ABLATION  01/08/2008    for atrial flutter     Review of patient's allergies indicates:   Allergen Reactions    Acetaminophen      Itching    Oxycodone-acetaminophen      Other reaction(s): Itching    Ace inhibitors Other (See Comments)     cough       Scheduled Medications:    aspirin  81 mg Oral Daily    ergocalciferol  50,000 Units Oral Q7 Days    ferrous sulfate  325 mg Oral Daily    furosemide  80 mg Intravenous BID    gabapentin  100 mg Oral BID    isosorbide-hydrALAZINE 20-37.5 mg  1 tablet Oral BID    lidocaine  3 patch Transdermal Q24H    methocarbamol  500 mg Oral BID PC    methyl salicylate-menthol 15-10%   Topical (Top) TID    metoprolol tartrate  25 mg Oral Q6H    pantoprazole  40 mg Oral Daily    polyethylene glycol  17 g Oral BID    senna-docusate 8.6-50 mg  1 tablet Oral BID    warfarin  7.5 mg Oral Daily       PRN Medications: albuterol, calcium carbonate, diphenhydrAMINE, HYDROmorphone, nitroGLYCERIN, ondansetron, ramelteon, sodium chloride 0.9%, sodium chloride 0.9%    Family History     Problem Relation (Age of Onset)    Alcohol abuse Father    Hypertension Mother, Father, Sister, Brother    Stroke Mother        Tobacco Use    Smoking status: Never Smoker    Smokeless tobacco: Never Used   Substance and Sexual Activity    Alcohol use: No    Drug use: No    Sexual activity: Never     Review of Systems    Constitutional: Positive for activity change. Negative for fatigue and fever.        R shoulder pain and R leg numbness and decreased mobility    HENT: Negative for trouble swallowing and voice change.    Respiratory: Negative for cough and shortness of breath.    Cardiovascular: Negative for chest pain and leg swelling.   Gastrointestinal: Negative for abdominal distention and abdominal pain.   Genitourinary: Negative for difficulty urinating and flank pain.   Musculoskeletal: Positive for gait problem. Negative for back pain.   Skin: Negative for color change and rash.   Neurological: Positive for weakness. Negative for speech difficulty and numbness.   Psychiatric/Behavioral: Negative for agitation and confusion.     Objective:     Vital Signs (Most Recent):  Temp: 97.8 °F (36.6 °C) (07/11/19 1122)  Pulse: 67 (07/11/19 1122)  Resp: 18 (07/11/19 1122)  BP: (!) 119/55 (07/11/19 1122)  SpO2: (!) 93 % (07/11/19 1122)    Vital Signs (24h Range):  Temp:  [97.8 °F (36.6 °C)-98.6 °F (37 °C)] 97.8 °F (36.6 °C)  Pulse:  [65-83] 67  Resp:  [16-18] 18  SpO2:  [92 %-96 %] 93 %  BP: ()/(55-75) 119/55     Body mass index is 37.44 kg/m².    Physical Exam   Constitutional: He is oriented to person, place, and time. He appears well-developed and well-nourished.   HENT:   Head: Normocephalic and atraumatic.   Eyes: Pupils are equal, round, and reactive to light. EOM are normal.   Neck: Neck supple.   Pulmonary/Chest: Effort normal. No respiratory distress.   Musculoskeletal: He exhibits edema (BLE).   - RLE 1/5   - RUE painful upon movement, decreased muscle strength    Neurological: He is alert and oriented to person, place, and time. A sensory deficit (RLE) is present.   Skin: Skin is warm and dry.   Psychiatric: He has a normal mood and affect. His behavior is normal. Thought content normal.   Nursing note and vitals reviewed.    NEUROLOGICAL EXAMINATION:     MENTAL STATUS   Oriented to person, place, and time.      CRANIAL NERVES     CN III, IV, VI   Pupils are equal, round, and reactive to light.  Extraocular motions are normal.       Diagnostic Results:   Labs: Reviewed  ECG: Reviewed  MRI: Reviewed

## 2019-07-11 NOTE — PROGRESS NOTES
PHARMACY CONSULT NOTE: WARFARIN    Hamlet Terrell is a 53 y.o. male on warfarin therapy for Atrial Fibrillation. PharmD has been consulted for warfarin dosing.    Current order: warfarin 7.5 mg daily with lovenox bridge   Home dose: 6 mg daily   Coumadin clinic enrollment: Active  INR goal: 2-3    Lab Results   Component Value Date    INR 2.0 (H) 07/11/2019    INR 1.9 (H) 07/10/2019    INR 1.5 (H) 07/09/2019     Diet: Adult Cardiac    Recommendation(s):    Continue warfarin 7.5 mg daily. Discontinue lovenox bridge as INR is > 2.0   Pharmacy will continue to follow and monitor warfarin    Thank you for the consult,  Bina Garcias, PharmD, BCPS  [id number]     **Note: Consults are reviewed Monday-Friday 7:00am-3:30pm. THE ABOVE RECOMMENDATIONS ARE ONLY SUGGESTED.THE RECOMMENDATIONS SHOULD BE CONSIDERED IN CONJUNCTION WITH ALL PATIENT FACTORS. **

## 2019-07-11 NOTE — SUBJECTIVE & OBJECTIVE
Interval History: Patient stable from yesterday, UOP of 1.1L. Renal function improved slightly. No acute events overnight. No difficulty breathing.    Review of patient's allergies indicates:   Allergen Reactions    Acetaminophen      Itching    Oxycodone-acetaminophen      Other reaction(s): Itching    Ace inhibitors Other (See Comments)     cough     Current Facility-Administered Medications   Medication Frequency    albuterol inhaler 1 puff Q6H PRN    aspirin EC tablet 81 mg Daily    calcium carbonate 200 mg calcium (500 mg) chewable tablet 500 mg TID PRN    diphenhydrAMINE capsule 25 mg Q6H PRN    ergocalciferol capsule 50,000 Units Q7 Days    ferrous sulfate EC tablet 325 mg Daily    furosemide injection 80 mg BID    gabapentin 250 mg/5 mL (5 mL) solution 100 mg BID    HYDROmorphone 1 mg/mL oral liquid 1 mg Q4H PRN    isosorbide-hydrALAZINE 20-37.5 mg per tablet 1 tablet BID    lidocaine 5 % patch 3 patch Q24H    methocarbamol tablet 500 mg BID PC    methyl salicylate-menthol 15-10% cream TID    metoprolol tartrate (LOPRESSOR) tablet 25 mg Q6H    nitroGLYCERIN SL tablet 0.4 mg Q5 Min PRN    ondansetron injection 4 mg Q12H PRN    pantoprazole EC tablet 40 mg Daily    polyethylene glycol packet 17 g BID    ramelteon tablet 8 mg Nightly PRN    senna-docusate 8.6-50 mg per tablet 1 tablet BID    sodium chloride 0.9% flush 10 mL PRN    sodium chloride 0.9% flush 10 mL PRN    warfarin (COUMADIN) tablet 7.5 mg Daily       Objective:     Vital Signs (Most Recent):  Temp: 98.3 °F (36.8 °C) (07/11/19 1552)  Pulse: 77 (07/11/19 1552)  Resp: 16 (07/11/19 1552)  BP: (!) 108/57 (07/11/19 1552)  SpO2: (!) 93 % (07/11/19 1552)  O2 Device (Oxygen Therapy): room air (07/11/19 0500) Vital Signs (24h Range):  Temp:  [97.8 °F (36.6 °C)-98.6 °F (37 °C)] 98.3 °F (36.8 °C)  Pulse:  [65-82] 77  Resp:  [16-18] 16  SpO2:  [92 %-94 %] 93 %  BP: ()/(55-69) 108/57     Weight: 115 kg (253 lb 8.5 oz) (07/11/19  0500)  Body mass index is 37.44 kg/m².  Body surface area is 2.37 meters squared.    I/O last 3 completed shifts:  In: 900 [P.O.:900]  Out: 1640 [Urine:1640]    Physical Exam   Constitutional: He is oriented to person, place, and time. He appears well-developed and well-nourished. No distress.   HENT:   Mouth/Throat: No oropharyngeal exudate.   Eyes: Pupils are equal, round, and reactive to light. Conjunctivae are normal. No scleral icterus.   Neck: Normal range of motion. No thyromegaly present.   Cardiovascular: Normal heart sounds and intact distal pulses.   Heart rate irregularly irregular   Pulmonary/Chest: Effort normal. No respiratory distress. He has no wheezes. He has rales (bibasilar).   Abdominal: Soft. Bowel sounds are normal. He exhibits no distension. There is no tenderness. There is no guarding.   Musculoskeletal: He exhibits edema (2+ pitting BLE up to knees) and tenderness (Over R shoulder).   Lymphadenopathy:     He has no cervical adenopathy.   Neurological: He is alert and oriented to person, place, and time.   Skin: Skin is warm and dry. He is not diaphoretic.   Psychiatric: He has a normal mood and affect. His behavior is normal. Judgment and thought content normal.   Vitals reviewed.      Significant Labs:  CBC:   Recent Labs   Lab 07/11/19  0341   WBC 5.66   RBC 2.80*   HGB 6.7*   HCT 22.3*      MCV 80*   MCH 23.9*   MCHC 30.0*     CMP:   Recent Labs   Lab 07/11/19  0341   *   CALCIUM 9.7   ALBUMIN 2.9*   PROT 8.5*   *   K 3.5   CO2 30*   CL 90*   BUN 78*   CREATININE 2.7*   ALKPHOS 182*   ALT 7*   AST 18   BILITOT 0.6     All labs within the past 24 hours have been reviewed.     Significant Imaging:    X-Ray Chest AP Portable  Narrative: EXAMINATION:  XR CHEST AP PORTABLE    CLINICAL HISTORY:  evaluate for pulmonary edema/congestion in patient with CHF exacerbation;    TECHNIQUE:  Single frontal view of the chest was performed.    COMPARISON:  No 07/04/2019  ne    FINDINGS:  Cardiomegaly and pulmonary vascular congestion.  Ill-defined opacities at the right lung base possible small airspace disease or atelectatic changes.  Slightly blunted costophrenic angles suggests small amount of pleural effusion.  The upper lung fields are clear.  No significant changes  Impression: See above    Electronically signed by: Jeffrey Clark MD  Date:    07/08/2019  Time:    14:10

## 2019-07-11 NOTE — PLAN OF CARE
07/11/19 0924   Post-Acute Status   Post-Acute Authorization Placement   Post-Acute Placement Status Referrals Sent

## 2019-07-11 NOTE — CONSULTS
[facility]  Physical Medicine & Rehab  Consult Note    Patient Name: Hamlet Terrell  MRN: [id number]  Admission Date: 7/2/2019  Hospital Length of Stay: 7 days  Attending Physician: Oly Welsh MD     Inpatient consult to Physical Medicine & Rehabilitation  Consult performed by: Mary Butirago NP  Consult requested by:  Oly Welsh MD    Collaborating Physician: Mayra Moscoso MD  Reason for Consult:  Assess rehabilitation needs     Consults  Subjective:     Principal Problem: Acute on chronic combined systolic and diastolic heart failure    HPI: Hamlet Terrell is a 53-year-old male with PMHx of HTN, chronic systolic and diastolic heart failure, CVA, CAD, and MI. Patient presented to [facility] on 7/2/19 for R shoulder pain and was admitted for acute on chronic HR. On arrival, found to have BNP of 554 and CXR positive for congestion. Etiology for R shoulder pain and weakness remains unclear. S/p Right shoulder arthrocentesis - gram stain negative, culture Aerobic with No Growth. Hospital course complicated by fever (blood cxs negative, C-xray w/o signs of new infiltrate, abx d/c'd, fever resolved), and JEAN-PIERRE (nephrology was consulted and recommending increase Lasix and avoid nephrotoxic meds).     Functional History: Patient lives alone in [facility] with 3 ANAID and no handrails. Prior to admission, patient's level of function was (I).     Hospital Course:   7/8/19: Participated w/ PT & OT. Sit tos tand modA w/ RW. Ambulated 3 ft CGA- TotalA w/ RW. Grooming CGA. LBD totalA.   7/9/19: Participated w/ PT. Sit to stand modA w/ RW.     Past Medical History:   Diagnosis Date    Anticoagulant long-term use     Cardiomegaly     Chronic combined systolic and diastolic congestive heart failure     Coronary artery disease     Heart attack 2006    Hypertension     Hyperthyroidism, subclinical 1/2/2013    MI (myocardial infarction) 9/22/2013    MI in 2009      Paroxysmal atrial fibrillation     PE  (pulmonary embolism) 1/1/2013    IN 2010     S/P ablation of atrial flutter 2008    Stroke 2009    no residual weaknesses     Past Surgical History:   Procedure Laterality Date    RADIOFREQUENCY ABLATION  01/08/2008    for atrial flutter     Review of patient's allergies indicates:   Allergen Reactions    Acetaminophen      Itching    Oxycodone-acetaminophen      Other reaction(s): Itching    Ace inhibitors Other (See Comments)     cough       Scheduled Medications:    aspirin  81 mg Oral Daily    ergocalciferol  50,000 Units Oral Q7 Days    ferrous sulfate  325 mg Oral Daily    furosemide  80 mg Intravenous BID    gabapentin  100 mg Oral BID    isosorbide-hydrALAZINE 20-37.5 mg  1 tablet Oral BID    lidocaine  3 patch Transdermal Q24H    methocarbamol  500 mg Oral BID PC    methyl salicylate-menthol 15-10%   Topical (Top) TID    metoprolol tartrate  25 mg Oral Q6H    pantoprazole  40 mg Oral Daily    polyethylene glycol  17 g Oral BID    senna-docusate 8.6-50 mg  1 tablet Oral BID    warfarin  7.5 mg Oral Daily       PRN Medications: albuterol, calcium carbonate, diphenhydrAMINE, HYDROmorphone, nitroGLYCERIN, ondansetron, ramelteon, sodium chloride 0.9%, sodium chloride 0.9%    Family History     Problem Relation (Age of Onset)    Alcohol abuse Father    Hypertension Mother, Father, Sister, Brother    Stroke Mother        Tobacco Use    Smoking status: Never Smoker    Smokeless tobacco: Never Used   Substance and Sexual Activity    Alcohol use: No    Drug use: No    Sexual activity: Never     Review of Systems   Constitutional: Positive for activity change. Negative for fatigue and fever.        R shoulder pain and R leg numbness and decreased mobility    HENT: Negative for trouble swallowing and voice change.    Respiratory: Negative for cough and shortness of breath.    Cardiovascular: Negative for chest pain and leg swelling.   Gastrointestinal: Negative for abdominal distention and  abdominal pain.   Genitourinary: Negative for difficulty urinating and flank pain.   Musculoskeletal: Positive for gait problem. Negative for back pain.   Skin: Negative for color change and rash.   Neurological: Positive for weakness. Negative for speech difficulty and numbness.   Psychiatric/Behavioral: Negative for agitation and confusion.     Objective:     Vital Signs (Most Recent):  Temp: 97.8 °F (36.6 °C) (07/11/19 1122)  Pulse: 67 (07/11/19 1122)  Resp: 18 (07/11/19 1122)  BP: (!) 119/55 (07/11/19 1122)  SpO2: (!) 93 % (07/11/19 1122)    Vital Signs (24h Range):  Temp:  [97.8 °F (36.6 °C)-98.6 °F (37 °C)] 97.8 °F (36.6 °C)  Pulse:  [65-83] 67  Resp:  [16-18] 18  SpO2:  [92 %-96 %] 93 %  BP: ()/(55-75) 119/55     Body mass index is 37.44 kg/m².    Physical Exam   Constitutional: He is oriented to person, place, and time. He appears well-developed and well-nourished.   HENT:   Head: Normocephalic and atraumatic.   Eyes: Pupils are equal, round, and reactive to light. EOM are normal.   Neck: Neck supple.   Pulmonary/Chest: Effort normal. No respiratory distress.   Musculoskeletal: He exhibits edema (BLE).   - RLE 1/5   - RUE painful upon movement, decreased muscle strength    Neurological: He is alert and oriented to person, place, and time. A sensory deficit (RLE) is present.   Skin: Skin is warm and dry.   Psychiatric: He has a normal mood and affect. His behavior is normal. Thought content normal.   Nursing note and vitals reviewed.    Diagnostic Results:   Labs: Reviewed  ECG: Reviewed  MRI: Reviewed    Assessment/Plan:      Acute on chronic combined systolic and diastolic heart failure      - continue Lasix      - Cards following     Acute pain of right shoulder due to trauma  - s/p Right shoulder arthrocentesis - gram stain negative, culture Aerobic with No Growth.    Impaired mobility  - Related to prolonged/acute hospital course.     Recommendations  -  Encourage mobility, OOB in chair at least 3  hours per day, and early ambulation as appropriate  -  PT/OT evaluate and treat  -  Pain management  -  Monitor for and prevent skin breakdown and pressure ulcers  · Early mobility, repositioning/weight shifting every 20-30 minutes when sitting, turn patient every 2 hours, proper mattress/overlay and chair cushioning, pressure relief/heel protector boots  -  DVT prophylaxis    -  Reviewed discharge options (IP rehab, SNF, HH therapy, and OP therapy)    JEAN-PIERRE (acute kidney injury)  - nephrology was consulted and recommending increase Lasix and avoid nephrotoxic meds    Personal history of venous thrombosis and embolism  - Coumadin    Participating with therapy. Will follow progress and discuss with rehab team for post acute care/rehab recommendation.    Thank you for your consult.     Mary Buitrago NP  Department of Physical Medicine & Rehab  [facility]

## 2019-07-12 LAB
ALBUMIN SERPL BCP-MCNC: 3.2 G/DL (ref 3.5–5.2)
ALP SERPL-CCNC: 188 U/L (ref 55–135)
ALT SERPL W/O P-5'-P-CCNC: 8 U/L (ref 10–44)
ANION GAP SERPL CALC-SCNC: 17 MMOL/L (ref 8–16)
AST SERPL-CCNC: 21 U/L (ref 10–40)
BACTERIA SPEC ANAEROBE CULT: NORMAL
BILIRUB SERPL-MCNC: 0.7 MG/DL (ref 0.1–1)
BNP SERPL-MCNC: 364 PG/ML (ref 0–99)
BUN SERPL-MCNC: 80 MG/DL (ref 6–20)
CALCIUM SERPL-MCNC: 10 MG/DL (ref 8.7–10.5)
CHLORIDE SERPL-SCNC: 89 MMOL/L (ref 95–110)
CO2 SERPL-SCNC: 30 MMOL/L (ref 23–29)
CREAT SERPL-MCNC: 2.2 MG/DL (ref 0.5–1.4)
EST. GFR  (AFRICAN AMERICAN): 38.4 ML/MIN/1.73 M^2
EST. GFR  (NON AFRICAN AMERICAN): 33.2 ML/MIN/1.73 M^2
GLUCOSE SERPL-MCNC: 130 MG/DL (ref 70–110)
INR PPP: 2 (ref 0.8–1.2)
INTERPRETATION SERPL IFE-IMP: NORMAL
KAPPA LC SER QL IA: 16.05 MG/DL (ref 0.33–1.94)
KAPPA LC/LAMBDA SER IA: 2.61 (ref 0.26–1.65)
LAMBDA LC SER QL IA: 6.16 MG/DL (ref 0.57–2.63)
MAGNESIUM SERPL-MCNC: 2.4 MG/DL (ref 1.6–2.6)
PATHOLOGIST INTERPRETATION IFE: NORMAL
PATHOLOGIST INTERPRETATION SPE: NORMAL
POTASSIUM SERPL-SCNC: 3.6 MMOL/L (ref 3.5–5.1)
PROT SERPL-MCNC: 9 G/DL (ref 6–8.4)
PROTHROMBIN TIME: 19.6 SEC (ref 9–12.5)
SODIUM SERPL-SCNC: 136 MMOL/L (ref 136–145)

## 2019-07-12 PROCEDURE — 83735 ASSAY OF MAGNESIUM: CPT

## 2019-07-12 PROCEDURE — 99232 PR SUBSEQUENT HOSPITAL CARE,LEVL II: ICD-10-PCS | Mod: ,,, | Performed by: INTERNAL MEDICINE

## 2019-07-12 PROCEDURE — 93010 EKG 12-LEAD: ICD-10-PCS | Mod: ,,, | Performed by: INTERNAL MEDICINE

## 2019-07-12 PROCEDURE — 93010 ELECTROCARDIOGRAM REPORT: CPT | Mod: ,,, | Performed by: INTERNAL MEDICINE

## 2019-07-12 PROCEDURE — 20600001 HC STEP DOWN PRIVATE ROOM

## 2019-07-12 PROCEDURE — 25000003 PHARM REV CODE 250: Performed by: HOSPITALIST

## 2019-07-12 PROCEDURE — 99232 SBSQ HOSP IP/OBS MODERATE 35: CPT | Mod: ,,, | Performed by: INTERNAL MEDICINE

## 2019-07-12 PROCEDURE — 25000003 PHARM REV CODE 250: Performed by: STUDENT IN AN ORGANIZED HEALTH CARE EDUCATION/TRAINING PROGRAM

## 2019-07-12 PROCEDURE — 25000003 PHARM REV CODE 250: Performed by: PHYSICIAN ASSISTANT

## 2019-07-12 PROCEDURE — 25000003 PHARM REV CODE 250: Performed by: INTERNAL MEDICINE

## 2019-07-12 PROCEDURE — 85610 PROTHROMBIN TIME: CPT

## 2019-07-12 PROCEDURE — 80053 COMPREHEN METABOLIC PANEL: CPT

## 2019-07-12 PROCEDURE — 36415 COLL VENOUS BLD VENIPUNCTURE: CPT

## 2019-07-12 PROCEDURE — 97530 THERAPEUTIC ACTIVITIES: CPT

## 2019-07-12 PROCEDURE — 93005 ELECTROCARDIOGRAM TRACING: CPT

## 2019-07-12 PROCEDURE — 83520 IMMUNOASSAY QUANT NOS NONAB: CPT | Mod: 59

## 2019-07-12 PROCEDURE — 83880 ASSAY OF NATRIURETIC PEPTIDE: CPT

## 2019-07-12 RX ADMIN — METOPROLOL TARTRATE 25 MG: 25 TABLET, FILM COATED ORAL at 11:07

## 2019-07-12 RX ADMIN — WARFARIN SODIUM 7.5 MG: 7.5 TABLET ORAL at 05:07

## 2019-07-12 RX ADMIN — METHOCARBAMOL TABLETS 500 MG: 500 TABLET, COATED ORAL at 08:07

## 2019-07-12 RX ADMIN — HYDRALAZINE HYDROCHLORIDE AND ISOSORBIDE DINITRATE 1 TABLET: 37.5; 2 TABLET, FILM COATED ORAL at 09:07

## 2019-07-12 RX ADMIN — FUROSEMIDE 80 MG: 80 TABLET ORAL at 08:07

## 2019-07-12 RX ADMIN — METOPROLOL TARTRATE 25 MG: 25 TABLET, FILM COATED ORAL at 12:07

## 2019-07-12 RX ADMIN — METOPROLOL TARTRATE 25 MG: 25 TABLET, FILM COATED ORAL at 05:07

## 2019-07-12 RX ADMIN — ASPIRIN 81 MG: 81 TABLET, COATED ORAL at 08:07

## 2019-07-12 RX ADMIN — MENTHOL, METHYL SALICYLATE: 10; 15 CREAM TOPICAL at 08:07

## 2019-07-12 RX ADMIN — FUROSEMIDE 80 MG: 80 TABLET ORAL at 05:07

## 2019-07-12 RX ADMIN — RAMELTEON 8 MG: 8 TABLET, FILM COATED ORAL at 08:07

## 2019-07-12 RX ADMIN — HYDRALAZINE HYDROCHLORIDE AND ISOSORBIDE DINITRATE 1 TABLET: 37.5; 2 TABLET, FILM COATED ORAL at 08:07

## 2019-07-12 RX ADMIN — PANTOPRAZOLE SODIUM 40 MG: 40 TABLET, DELAYED RELEASE ORAL at 08:07

## 2019-07-12 RX ADMIN — MENTHOL, METHYL SALICYLATE: 10; 15 CREAM TOPICAL at 03:07

## 2019-07-12 RX ADMIN — GABAPENTIN 100 MG: 250 SOLUTION ORAL at 08:07

## 2019-07-12 RX ADMIN — FERROUS SULFATE TAB EC 325 MG (65 MG FE EQUIVALENT) 325 MG: 325 (65 FE) TABLET DELAYED RESPONSE at 08:07

## 2019-07-12 NOTE — ASSESSMENT & PLAN NOTE
BL sCr 1.3 last on 7/2 day of admit  sCr has been trending up to 2.9 today  Was on lasix pushes, then started on gtt until 7/9 with good UOP - net negative 5.5L total  Previously JEAN-PIERRE believed 2/2 cardiorenal syndrome as patient had worsening renal function in setting of heart failure exacerbation, but renal function continued to decline despite diuresis  Patient has been getting primarily morphine for pain relief throughout admission, started on gabapentin, and has been on therapeutic dose lovenox as a bridge for warfarin as he was found to have subtherapeutic INR  BP has been a bit labile with dips down to 80s/50s, but currently stable    JEAN-PIERRE likely to be multifactorial - Congestion from HF, multiple potentially nephrotoxic meds, hypotensive episodes. In setting of hypercalcemia, hypoalbuminemia, and orthopedic pain, will also pursue multiple myeloma workup to rule out this malignancy as a contributing factor. SPEP previously done by primary team showed mild elevation at 8.6. US RP positive for medical renal disease.    Plan:  - Follow-up UPEP, KIANNA, serum free light chains, CXR, VBG  - Previous notes indicate eliquis has been offered as an alternative form of AC; in this patient with questionable compliance with warfarin, maybe consider broaching topic again?  - Renally dose meds - maximum 300mg dose of gabapentin  - Liquid dilaudid for pain control  - Would decrease diuretics in this patient - lasix 80mg po bid - watch for overdiuresis  - Recommend holding antihypertensives if SBP <120 to avoid further hypotensive injury  - Orthostatic vitals qshift  - Strict Is and Os and daily weights  - Avoid further nephrotoxic meds  - Will monitor renal function

## 2019-07-12 NOTE — PROGRESS NOTES
"[facility]  Adult Nutrition  Progress Note    SUMMARY       Recommendations    Recommendation:   1. Continue current diet as tolerated with fluid restriction per MD.   2. RD to monitor & follow-up.    Goals: Adequate PO intake to meet % of EEN and EPN x 7 days  Nutrition Goal Status: new  Communication of RD Recs: other (comment)(POC)    Reason for Assessment    Reason For Assessment: RD follow-up  Diagnosis: cardiac disease(CHF)  Relevant Medical History: HTN, MI, stroke  Interdisciplinary Rounds: did not attend  General Information Comments: Pt reports good appetite and reports he is eating 95% of his meals, confirmed by RN documentation. Pt denies wt changes. Some wt loss noted per chart, likely fluid as pt w/ I/O of -8.7L since admission. Pt appears nourished, NFPE not indicated at this time. Pt endorses following a low Na diet at home and does not have questions about dietary restrictions at this time.    Nutrition Risk Screen    Nutrition Risk Screen: no indicators present    Nutrition/Diet History    Spiritual, Cultural Beliefs, Sabianism Practices, Values that Affect Care: no    Anthropometrics    Temp: 97.7 °F (36.5 °C)  Height Method: Stated  Height: 5' 9" (175.3 cm)  Height (inches): 69 in  Weight Method: Standard Scale  Weight: 114.5 kg (252 lb 6.8 oz)  Weight (lb): 252.43 lb  Ideal Body Weight (IBW), Male: 160 lb  % Ideal Body Weight, Male (lb): 163.13 lb  BMI (Calculated): 38.6       Lab/Procedures/Meds    Pertinent Labs Reviewed: reviewed  Pertinent Labs Comments: BUN 80, Cr 2.2, Glu 130, alk phos 188, Alb 3.2  Pertinent Medications Reviewed: reviewed  Pertinent Medications Comments: lasix, pantoprazole, warfarin, ergocalciferol, ferrous sulfate    Estimated/Assessed Needs    Weight Used For Calorie Calculations: 114.5 kg (252 lb 6.8 oz)  Energy Calorie Requirements (kcal): 1985 kcal/day(no AF 2/2 obesity)  Energy Need Method: Kassie Guo  Protein Requirements: 115-138 " g/day  Weight Used For Protein Calculations: 114.5 kg (252 lb 6.8 oz)  Fluid Requirements (mL): per MD or 1 mL/kcal     RDA Method (mL): 1985    Nutrition Prescription Ordered    Current Diet Order: Cardiac  Nutrition Order Comments: 1500 mL FR    Evaluation of Received Nutrient/Fluid Intake    I/O: -8.7L since admit  Energy Calories Required: meeting needs  Protein Required: meeting needs  Fluid Required: meeting needs  Tolerance: tolerating  % Intake of Estimated Energy Needs: 75 - 100 %  % Meal Intake: 75 - 100 %    Nutrition Risk    Level of Risk/Frequency of Follow-up: low     Assessment and Plan    No nutrition dx at this time.    Monitor and Evaluation    Food and Nutrient Intake: energy intake, food and beverage intake  Food and Nutrient Adminstration: diet order  Anthropometric Measurements: weight, weight change, body mass index  Biochemical Data, Medical Tests and Procedures: electrolyte and renal panel, gastrointestinal profile, glucose/endocrine profile, inflammatory profile, lipid profile  Nutrition-Focused Physical Findings: overall appearance     Malnutrition Assessment  Pt does not meet criteria for malnutrition at this time.    Nutrition Follow-Up    RD Follow-up?: Yes

## 2019-07-12 NOTE — SUBJECTIVE & OBJECTIVE
Interval History: UOP 3L over past day. Today, patient complaining of R hand swelling and pain. Otherwise patient stable from prior with improvement in renal function.    Review of patient's allergies indicates:   Allergen Reactions    Acetaminophen      Itching    Oxycodone-acetaminophen      Other reaction(s): Itching    Ace inhibitors Other (See Comments)     cough     Current Facility-Administered Medications   Medication Frequency    albuterol inhaler 1 puff Q6H PRN    aspirin EC tablet 81 mg Daily    calcium carbonate 200 mg calcium (500 mg) chewable tablet 500 mg TID PRN    diphenhydrAMINE capsule 25 mg Q6H PRN    ergocalciferol capsule 50,000 Units Q7 Days    ferrous sulfate EC tablet 325 mg Daily    furosemide tablet 80 mg BID    gabapentin 250 mg/5 mL (5 mL) solution 100 mg BID    HYDROmorphone 1 mg/mL oral liquid 1 mg Q4H PRN    isosorbide-hydrALAZINE 20-37.5 mg per tablet 1 tablet BID    lidocaine 5 % patch 3 patch Q24H    methocarbamol tablet 500 mg BID PC    methyl salicylate-menthol 15-10% cream TID    metoprolol tartrate (LOPRESSOR) tablet 25 mg Q6H    nitroGLYCERIN SL tablet 0.4 mg Q5 Min PRN    ondansetron injection 4 mg Q12H PRN    pantoprazole EC tablet 40 mg Daily    polyethylene glycol packet 17 g BID    ramelteon tablet 8 mg Nightly PRN    senna-docusate 8.6-50 mg per tablet 1 tablet BID    sodium chloride 0.9% flush 10 mL PRN    sodium chloride 0.9% flush 10 mL PRN    warfarin (COUMADIN) tablet 7.5 mg Daily       Objective:     Vital Signs (Most Recent):  Temp: 98 °F (36.7 °C) (07/12/19 0830)  Pulse: 77 (07/12/19 1000)  Resp: 18 (07/12/19 0830)  BP: (!) 105/59 (07/12/19 0830)  SpO2: 95 % (07/12/19 0830)  O2 Device (Oxygen Therapy): room air (07/12/19 0830) Vital Signs (24h Range):  Temp:  [97.8 °F (36.6 °C)-98.3 °F (36.8 °C)] 98 °F (36.7 °C)  Pulse:  [63-97] 77  Resp:  [16-18] 18  SpO2:  [92 %-95 %] 95 %  BP: (105-128)/(55-69) 105/59     Weight: 114.5 kg (252 lb 6.8  oz) (07/12/19 0500)  Body mass index is 37.28 kg/m².  Body surface area is 2.36 meters squared.    I/O last 3 completed shifts:  In: 305 [P.O.:305]  Out: 4050 [Urine:4050]    Physical Exam   Constitutional: He is oriented to person, place, and time. He appears well-developed and well-nourished. No distress.   HENT:   Mouth/Throat: No oropharyngeal exudate.   Eyes: Pupils are equal, round, and reactive to light. Conjunctivae are normal. No scleral icterus.   Neck: Normal range of motion. No thyromegaly present.   Cardiovascular: Normal heart sounds and intact distal pulses.   Heart rate irregularly irregular   Pulmonary/Chest: Effort normal. No respiratory distress. He has no wheezes. He has rales (bibasilar).   Abdominal: Soft. Bowel sounds are normal. He exhibits no distension. There is no tenderness. There is no guarding.   Musculoskeletal: He exhibits edema (2+ pitting BLE up to knees) and tenderness (Over R shoulder).   Lymphadenopathy:     He has no cervical adenopathy.   Neurological: He is alert and oriented to person, place, and time.   Skin: Skin is warm and dry. He is not diaphoretic.   Psychiatric: He has a normal mood and affect. His behavior is normal. Judgment and thought content normal.   Vitals reviewed.      Significant Labs:  CBC:   Recent Labs   Lab 07/11/19  0341   WBC 5.66   RBC 2.80*   HGB 6.7*   HCT 22.3*      MCV 80*   MCH 23.9*   MCHC 30.0*     CMP:   Recent Labs   Lab 07/12/19  0438   *   CALCIUM 10.0   ALBUMIN 3.2*   PROT 9.0*      K 3.6   CO2 30*   CL 89*   BUN 80*   CREATININE 2.2*   ALKPHOS 188*   ALT 8*   AST 21   BILITOT 0.7     All labs within the past 24 hours have been reviewed.     Significant Imaging:    US Retroperitoneal Complete (Kidney and  Narrative: EXAMINATION:  US RETROPERITONEAL COMPLETE    CLINICAL HISTORY:  lola;    TECHNIQUE:  Ultrasound of the kidneys and urinary bladder was performed including color flow and Doppler evaluation of the  kidneys.    COMPARISON:  Ultrasound retroperitoneal complete 04/18/2019    FINDINGS:  Right kidney: The right kidney measures 11.8 cm. No cortical thinning. Mild loss of corticomedullary distinction.  Perfusion is decreased.  Resistive index measures 0.85.  No mass. No renal stone. No hydronephrosis.  Note is made that the previously identified hypoattenuating lesion described on CT lumbar spine 04/17/2019 is not identified on today's study secondary to poor acoustic penetration.    Left kidney: The left kidney measures 9.9 cm. No cortical thinning. Mild loss of corticomedullary distinction.  Perfusion is decreased.  Resistive index measures 0.77.  No mass. No renal stone. No hydronephrosis.    The bladder is partially distended at the time of scanning and has an unremarkable appearance.  Impression: Bilateral decreased perfusion and elevated resistive indices consistent with chronic bilateral medical renal disease.    Previously seen indeterminate right renal lesion not visualized with ultrasound secondary to poor acoustic penetration.  Consider dedicated CT follow-up 6 months after initial discovery.    Electronically signed by resident: Sabi Landry  Date:    07/11/2019  Time:    18:17    Electronically signed by: Harley Vidal MD  Date:    07/11/2019  Time:    19:35

## 2019-07-12 NOTE — PLAN OF CARE
Problem: Adult Inpatient Plan of Care  Goal: Plan of Care Review  Outcome: Ongoing (interventions implemented as appropriate)  Plan of care discussed with patient. Patient is free of fall/trauma/injury. Denies CP, SOB. Patient complaining of right hand pain and hand is swollen, MD aware and ice pack and elevation applied. Seen by nephrology. Diuresing with oral lasix.  Afib on the monitor. Compliant with morning medications.  All questions addressed. Will continue to monitor

## 2019-07-12 NOTE — CONSULTS
Inpatient consult to Physical Medicine Rehab  Consult performed by: Rhiannon Baker NP  Consult ordered by: Oly Welsh MD  Reason for consult: assess rehab needs        Already following patient. Please see previous consult note.     DORCAS Karimi, FNP-C  Physical Medicine & Rehabilitation   07/12/2019  Spectralink: [id number]

## 2019-07-12 NOTE — PT/OT/SLP PROGRESS
"Occupational Therapy   Treatment    Name: Hamlet Terrell  MRN: [id number]  Admitting Diagnosis:  Acute on chronic combined systolic and diastolic heart failure       Recommendations:     Discharge Recommendations: nursing facility, skilled  Discharge Equipment Recommendations:  commode, walker, rolling  Barriers to discharge:  Decreased caregiver support    Assessment:     Hamlet Terrell is a 53 y.o. male with a medical diagnosis of Acute on chronic combined systolic and diastolic heart failure.  He presents with performance deficits affecting function are weakness, impaired functional mobilty, impaired endurance, gait instability, impaired balance, decreased upper extremity function, decreased lower extremity function, impaired self care skills, decreased safety awareness, impaired cardiopulmonary response to activity, impaired fine motor, decreased ROM. Pt found sitting UIC upon OT/PT entry. Pt adamantly refusing OOB ax after max encouragement from therapist to participate in therapy session and risk limited mobility. Pt reported he was not going to work with therapy due to "R hand being swollen" and wouldn't be able to grasp RW. OT/PT provided education on how to properly assist pt with transfer despite R hand deficts. Pt countues to refused all OOB ax this date. OT provided educated on AAROM for B UE/LE exercises in which pt reported " I do them at night". Pt actively engaged R shoulder and R biceps but guarded to R wrist and R digits. Pt would not attempt movement of R hand and wrist but agreeable to allow therapist to assist with PROM. As soon as therpist touched R hand pt yell out and pulled R UE away from therapist stating, " I just got the pain to go away and now look what you did.". Pt educated on AAROM of R UE and R LE exercises via teach back method and educated on OT POC.     Rehab Prognosis:  Fair; patient would benefit from acute skilled OT services to address these deficits and reach maximum level of " "function.       Plan:     Patient to be seen 3 x/week to address the above listed problems via self-care/home management, therapeutic activities, therapeutic exercises  · Plan of Care Expires: 08/07/19  · Plan of Care Reviewed with: patient    Subjective     Pain/Comfort:  · Pain Rating 1: (Pt did not rate; pt reported pain in R hand )  · Location - Orientation 1: generalized  · Location 1: hand  · Pain Addressed 1: Reposition, Distraction    Objective:     Communicated with: RN  prior to session.  Patient found up in chair with telemetry upon OT entry to room.    Pt stated, " I've been sitting up ion this chair since I've been here!"     General Precautions: Standard, fall   Orthopedic Precautions:N/A   Braces: N/A     Occupational Performance:     Bed Mobility/Functional Mobility/Transfers/ Activities of Daily Living:  - Pt refused to perform all of the above this date.     [facility] 6 Click ADL: 14    Treatment & Education:  - Pt educated provided on AAROM/AROM for B UEs.  - Pt educated on OT POC and potential next treatment session.   - pt educated on d/c recommendations for SNF placement   - Pt found with heat pack on R hand and elevated on pillows.   - Pt educated on importance of functional mobility and ADLs performance despite pain.       Patient left up in chair with all lines intact, call button in reach and RN notifiedEducation:      GOALS:   Multidisciplinary Problems     Occupational Therapy Goals        Problem: Occupational Therapy Goal    Goal Priority Disciplines Outcome Interventions   Occupational Therapy Goal     OT, PT/OT Ongoing (interventions implemented as appropriate)    Description:  Goals to be met by: 7/15/19     Patient will increase functional independence with ADLs by performing:    UE Dressing with Moderate Assistance.  LE Dressing with Maximum Assistance.  Grooming while seated with Minimal Assistance.  Toileting from bedside commode with Maximum Assistance for hygiene and clothing " management.   Rolling to Bilateral with Maximum Assistance.   Supine to sit with Maximum Assistance.  Stand pivot transfers with Maximum Assistance.  Step transfer with Maximum Assistance  Toilet transfer to bedside commode with Maximum Assistance.                      Time Tracking:     OT Date of Treatment: 07/12/19  OT Start Time: 1419  OT Stop Time: 1428  OT Total Time (min): 9 min    Billable Minutes:Therapeutic Activity 9    Esthela Faye, OT  7/12/2019

## 2019-07-12 NOTE — PROGRESS NOTES
Patient complained that his Right hand was hurting. Upon morning assessment it is noted that patient's right hand is significantly more swollen than left, patient complained of severe pain and refused to let it be touched. MD Welsh notified. Patient had been leaning/laying on right arm all day yesterday. Ice pack applied and arm elevated. Will continue to monitor closely and notify of any changes.

## 2019-07-12 NOTE — PROGRESS NOTES
[facility]  Nephrology  Progress Note    Patient Name: Hamlet Terrell  MRN: [id number]  Admission Date: 7/2/2019  Hospital Length of Stay: 8 days  Attending Provider: Oly Welsh MD   Primary Care Physician: Carlin Swift MD  Principal Problem:Acute on chronic combined systolic and diastolic heart failure    Subjective:     HPI: Mr. Terrell is a 53M with HFrEF (4/2019 EF 23%, DD, PAP 47, CVP 15), HTN, AFib on coumadin, AFlutter s/p ablation, CVA 2009, CAD who presented on 7/2 with R shoulder/arm pain and ultimately admitted for acute on chronic combined heart failure. Patient reports that he is unable to lay flat at home and normally sleeps on 2 pillows. He is able to ambulate around his home without getting SOB, but does not leave home often. He does not use any walking aids like a walker or cane. He does have LE edema but has not personally noticed any worsening in edema from baseline. Denied excessive salt and fluid intake in daily diet.    On admit, he was found to have a BNP of 554 and CXR positive for congestion. He was given multiple doses of lasix and subsequently placed on lasix gtt until 7/9. He had had good UOP of 1-2.5L per day. Initially, JEAN-PIERRE was attributed to cardiorenal syndrome, but nephrology consulted on 7/10 due to worsening renal function.    Interval History: UOP 3L over past day. Today, patient complaining of R hand swelling and pain. Otherwise patient stable from prior with improvement in renal function.    Review of patient's allergies indicates:   Allergen Reactions    Acetaminophen      Itching    Oxycodone-acetaminophen      Other reaction(s): Itching    Ace inhibitors Other (See Comments)     cough     Current Facility-Administered Medications   Medication Frequency    albuterol inhaler 1 puff Q6H PRN    aspirin EC tablet 81 mg Daily    calcium carbonate 200 mg calcium (500 mg) chewable tablet 500 mg TID PRN    diphenhydrAMINE capsule 25 mg Q6H PRN     ergocalciferol capsule 50,000 Units Q7 Days    ferrous sulfate EC tablet 325 mg Daily    furosemide tablet 80 mg BID    gabapentin 250 mg/5 mL (5 mL) solution 100 mg BID    HYDROmorphone 1 mg/mL oral liquid 1 mg Q4H PRN    isosorbide-hydrALAZINE 20-37.5 mg per tablet 1 tablet BID    lidocaine 5 % patch 3 patch Q24H    methocarbamol tablet 500 mg BID PC    methyl salicylate-menthol 15-10% cream TID    metoprolol tartrate (LOPRESSOR) tablet 25 mg Q6H    nitroGLYCERIN SL tablet 0.4 mg Q5 Min PRN    ondansetron injection 4 mg Q12H PRN    pantoprazole EC tablet 40 mg Daily    polyethylene glycol packet 17 g BID    ramelteon tablet 8 mg Nightly PRN    senna-docusate 8.6-50 mg per tablet 1 tablet BID    sodium chloride 0.9% flush 10 mL PRN    sodium chloride 0.9% flush 10 mL PRN    warfarin (COUMADIN) tablet 7.5 mg Daily       Objective:     Vital Signs (Most Recent):  Temp: 98 °F (36.7 °C) (07/12/19 0830)  Pulse: 77 (07/12/19 1000)  Resp: 18 (07/12/19 0830)  BP: (!) 105/59 (07/12/19 0830)  SpO2: 95 % (07/12/19 0830)  O2 Device (Oxygen Therapy): room air (07/12/19 0830) Vital Signs (24h Range):  Temp:  [97.8 °F (36.6 °C)-98.3 °F (36.8 °C)] 98 °F (36.7 °C)  Pulse:  [63-97] 77  Resp:  [16-18] 18  SpO2:  [92 %-95 %] 95 %  BP: (105-128)/(55-69) 105/59     Weight: 114.5 kg (252 lb 6.8 oz) (07/12/19 0500)  Body mass index is 37.28 kg/m².  Body surface area is 2.36 meters squared.    I/O last 3 completed shifts:  In: 305 [P.O.:305]  Out: 4050 [Urine:4050]    Physical Exam   Constitutional: He is oriented to person, place, and time. He appears well-developed and well-nourished. No distress.   HENT:   Mouth/Throat: No oropharyngeal exudate.   Eyes: Pupils are equal, round, and reactive to light. Conjunctivae are normal. No scleral icterus.   Neck: Normal range of motion. No thyromegaly present.   Cardiovascular: Normal heart sounds and intact distal pulses.   Heart rate irregularly irregular   Pulmonary/Chest:  Effort normal. No respiratory distress. He has no wheezes. He has rales (bibasilar).   Abdominal: Soft. Bowel sounds are normal. He exhibits no distension. There is no tenderness. There is no guarding.   Musculoskeletal: He exhibits edema (2+ pitting BLE up to knees) and tenderness (Over R shoulder).   Lymphadenopathy:     He has no cervical adenopathy.   Neurological: He is alert and oriented to person, place, and time.   Skin: Skin is warm and dry. He is not diaphoretic.   Psychiatric: He has a normal mood and affect. His behavior is normal. Judgment and thought content normal.   Vitals reviewed.      Significant Labs:  CBC:   Recent Labs   Lab 07/11/19  0341   WBC 5.66   RBC 2.80*   HGB 6.7*   HCT 22.3*      MCV 80*   MCH 23.9*   MCHC 30.0*     CMP:   Recent Labs   Lab 07/12/19  0438   *   CALCIUM 10.0   ALBUMIN 3.2*   PROT 9.0*      K 3.6   CO2 30*   CL 89*   BUN 80*   CREATININE 2.2*   ALKPHOS 188*   ALT 8*   AST 21   BILITOT 0.7     All labs within the past 24 hours have been reviewed.     Significant Imaging:    US Retroperitoneal Complete (Kidney and  Narrative: EXAMINATION:  US RETROPERITONEAL COMPLETE    CLINICAL HISTORY:  lola;    TECHNIQUE:  Ultrasound of the kidneys and urinary bladder was performed including color flow and Doppler evaluation of the kidneys.    COMPARISON:  Ultrasound retroperitoneal complete 04/18/2019    FINDINGS:  Right kidney: The right kidney measures 11.8 cm. No cortical thinning. Mild loss of corticomedullary distinction.  Perfusion is decreased.  Resistive index measures 0.85.  No mass. No renal stone. No hydronephrosis.  Note is made that the previously identified hypoattenuating lesion described on CT lumbar spine 04/17/2019 is not identified on today's study secondary to poor acoustic penetration.    Left kidney: The left kidney measures 9.9 cm. No cortical thinning. Mild loss of corticomedullary distinction.  Perfusion is decreased.  Resistive index  measures 0.77.  No mass. No renal stone. No hydronephrosis.    The bladder is partially distended at the time of scanning and has an unremarkable appearance.  Impression: Bilateral decreased perfusion and elevated resistive indices consistent with chronic bilateral medical renal disease.    Previously seen indeterminate right renal lesion not visualized with ultrasound secondary to poor acoustic penetration.  Consider dedicated CT follow-up 6 months after initial discovery.    Electronically signed by resident: Sabi Landry  Date:    07/11/2019  Time:    18:17    Electronically signed by: Harley Vidal MD  Date:    07/11/2019  Time:    19:35          Assessment/Plan:     JEAN-PIERRE (acute kidney injury)  BL sCr 1.3 last on 7/2 day of admit  sCr has been trending up to 2.9 today  Was on lasix pushes, then started on gtt until 7/9 with good UOP - net negative 5.5L total  Previously JEAN-PIERRE believed 2/2 cardiorenal syndrome as patient had worsening renal function in setting of heart failure exacerbation, but renal function continued to decline despite diuresis  Patient has been getting primarily morphine for pain relief throughout admission, started on gabapentin, and has been on therapeutic dose lovenox as a bridge for warfarin as he was found to have subtherapeutic INR  BP has been a bit labile with dips down to 80s/50s, but currently stable    JEAN-PIERRE likely to be multifactorial - Congestion from HF, multiple potentially nephrotoxic meds, hypotensive episodes. In setting of hypercalcemia, hypoalbuminemia, and orthopedic pain, will also pursue multiple myeloma workup to rule out this malignancy as a contributing factor. SPEP previously done by primary team showed mild elevation at 8.6. US RP positive for medical renal disease.    Plan:  - Follow-up UPEP, KIANNA, serum free light chains, CXR, VBG  - Previous notes indicate eliquis has been offered as an alternative form of AC; in this patient with questionable compliance with  warfarin, maybe consider broaching topic again?  - Renally dose meds - maximum 300mg dose of gabapentin  - Liquid dilaudid for pain control  - Would decrease diuretics in this patient - lasix 80mg po bid - watch for overdiuresis  - Recommend holding antihypertensives if SBP <120 to avoid further hypotensive injury  - Orthostatic vitals qshift  - Strict Is and Os and daily weights  - Avoid further nephrotoxic meds  - Will monitor renal function        Thank you for your consult. I will follow-up with patient. Please contact us if you have any additional questions.    Brooke Mao MD  Nephrology  [facility]

## 2019-07-12 NOTE — PT/OT/SLP PROGRESS
Physical Therapy      Patient Name:  Hamlet Terrell   MRN:  [id number]    Patient not seen today secondary to Patient unwilling to participate due R hand edema. Max encouragement and education on the importance of mobility provided, however pt continued to decline. Will follow-up at next scheduled session as able.    Eboni Pittman, PT, DPT   7/12/2019  [phone]

## 2019-07-12 NOTE — PLAN OF CARE
Problem: Occupational Therapy Goal  Goal: Occupational Therapy Goal  Goals to be met by: 7/15/19     Patient will increase functional independence with ADLs by performing:    UE Dressing with Moderate Assistance.  LE Dressing with Maximum Assistance.  Grooming while seated with Minimal Assistance.  Toileting from bedside commode with Maximum Assistance for hygiene and clothing management.   Rolling to Bilateral with Maximum Assistance.   Supine to sit with Maximum Assistance.  Stand pivot transfers with Maximum Assistance.  Step transfer with Maximum Assistance  Toilet transfer to bedside commode with Maximum Assistance.     Outcome: Ongoing (interventions implemented as appropriate)  Continue POC     Esthela Faye OTR/L  Pager: [phone]  7/12/2019

## 2019-07-12 NOTE — PROGRESS NOTES
Pt declining labs and second IV placement attempt. Notified pt with fellow RN of potential risks. Pt still adamant to be left alone till morning labs. Pt has had 2 rounds of asymptomatic VTACH. Will notify MD

## 2019-07-12 NOTE — PLAN OF CARE
Problem: Adult Inpatient Plan of Care  Goal: Plan of Care Review  Outcome: Ongoing (interventions implemented as appropriate)  Pt free of falls/trauma/injuries.  Denies c/o SOB, CP, or discomfort.  Generalized skin remains CDI; moderated generalized  edema noted.  Pt conitnues being diuresed with lasix; diuresing well. Pt has no current IV access, PICC consult placed. Labs that were ordered not obtained despite multiple sticks. Pt refused, agrees to AM labs. MD aware. VTACH X2 on monitor. Will continue to monitor. Neurology consulted and is following pt.  Pt tolerating plan of care

## 2019-07-12 NOTE — PLAN OF CARE
07/12/19 1040   Post-Acute Status   Post-Acute Authorization Placement   Post-Acute Placement Status Referrals Sent

## 2019-07-12 NOTE — PLAN OF CARE
07/12/19 1443   Discharge Reassessment   Assessment Type Discharge Planning Reassessment   Do you have any problems affording any of your prescribed medications? No   Discharge Plan A Rehab   Discharge Plan B Skilled Nursing Facility   DME Needed Upon Discharge  other (see comments)  (TBD)   Anticipated Discharge Disposition Rehab   Can the patient answer the patient profile reliably? Yes, cognitively intact   How does the patient rate their overall health at the present time? Fair   Describe the patient's ability to walk at the present time. Walks with the help of equipment   How often would a person be available to care for the patient? Never   Number of comorbid conditions (as recorded on the chart) Five or more   Post-Acute Status   Post-Acute Authorization Placement   Post-Acute Placement Status Referrals Sent   Home Health/Hospice Status Awaiting Internal Medical Clearance   Discharge Delays (!) Other  (awaiting acceptance)

## 2019-07-12 NOTE — PROGRESS NOTES
PHARMACY CONSULT NOTE: WARFARIN    Hamlet Terrell is a 53 y.o. male on warfarin therapy for Atrial Fibrillation. PharmD has been consulted for warfarin dosing.    Current order: warfarin 7.5 mg daily    Home dose: 6 mg daily   Coumadin clinic enrollment: Active  INR goal: 2-3    Lab Results   Component Value Date    INR 2.0 (H) 07/12/2019    INR 2.0 (H) 07/11/2019    INR 1.9 (H) 07/10/2019     Diet: Adult Cardiac    Recommendation(s):    Continue warfarin 7.5 mg daily   Pharmacy will continue to follow and monitor warfarin    Thank you for the consult,  Bina Garcias, PharmD, BCPS  [id number]     **Note: Consults are reviewed Monday-Friday 7:00am-3:30pm. THE ABOVE RECOMMENDATIONS ARE ONLY SUGGESTED.THE RECOMMENDATIONS SHOULD BE CONSIDERED IN CONJUNCTION WITH ALL PATIENT FACTORS. **

## 2019-07-12 NOTE — PLAN OF CARE
Problem: Adult Inpatient Plan of Care  Goal: Plan of Care Review  Recommendations     Recommendation:   1. Continue current diet as tolerated with fluid restriction per MD.   2. RD to monitor & follow-up.     Goals: Adequate PO intake to meet % of EEN and EPN x 7 days  Nutrition Goal Status: new  Communication of RD Recs: other (comment)(POC)    Assessment and Plan     No nutrition dx at this time.

## 2019-07-12 NOTE — PROGRESS NOTES
"Hospital Medicine  Progress Note    [place] [facility] [place] [facility] [place] Oly Welsh MD  Admit Date: 7/2/2019  DELMI 7/16/2019  Length of Stay:  LOS: 8 days   Code status: Full Code    Principal Problem:  Acute on chronic combined systolic and diastolic heart failure    HPI:  "54 yo M with combined CHF (Echo 4/2019 with EF 23%, diastolic dysfuction, moderated MR and TR), HTN, chronic A-fib (on Coumadin), A-flutter s/p ablation, CVA (in 2009), CAD, ?PE (patient reported), non-compliance, and drug seeking behavior presented right shoulder/arm pain following a fall injury associated with right leg weakness. Patient reports waking up to go to the bathroom during the night approximately 4 days ago when he was unable to bare weight on right leg due to weakness. He attempted to get back in the bed and aggravated/injured his right shoulder climbing back into bed. He was hoping the pain would resolve on its own but it has persisted for past 4 days prompting him to come to ED. He reports he can move his right upper extremity but refuses to because he does not want to illicit the pain."    Interval history: Patient complains of R hand pain, mild dependent swelling noted by Nursing. Improved with elevation. 11-beat run of V-tach noted; tele with frequent PVCs.    Review of Systems   Constitutional: Negative for fever.   Cardiovascular: Negative for chest pain.   Musculoskeletal: Positive for joint pain.       Physical Exam  Physical Exam   Constitutional: No distress.   Eyes: Conjunctivae and lids are normal.   Neck: JVD present.   Cardiovascular: S1 normal and S2 normal.   Pulmonary/Chest: Effort normal. He has rales.   Abdominal: Soft. Normal appearance and bowel sounds are normal.   Musculoskeletal: He exhibits edema.        Right shoulder: He exhibits decreased range of motion, tenderness and pain.   Neurological: He is alert. He is not disoriented.       Temp:  [97.5 °F (36.4 °C)-98.3 °F (36.8 °C)]   Pulse:  [63-97]   Resp:  [16-18] "   BP: (105-128)/(54-69)   SpO2:  [92 %-95 %]     Intake/Output Summary (Last 24 hours) at 7/12/2019 1326  Last data filed at 7/12/2019 0500  Gross per 24 hour   Intake 125 ml   Output 2950 ml   Net -2825 ml       Recent Labs   Lab 07/07/19  0638 07/08/19  0511 07/11/19  0341   WBC 7.20 6.99 5.66   HGB 7.1* 7.5* 6.7*   HCT 23.9* 24.5* 22.3*    315 265     Recent Labs   Lab 07/10/19  0536 07/11/19  0341 07/12/19  0438   * 135* 136   K 4.1 3.5 3.6   CL 89* 90* 89*   CO2 28 30* 30*   BUN 71* 78* 80*   CREATININE 2.9* 2.7* 2.2*   GLU 91 166* 130*   CALCIUM 10.4 9.7 10.0   MG 2.3 2.4 2.4     Recent Labs   Lab 07/10/19  0536 07/11/19  0341 07/12/19  0437 07/12/19  0438   ALKPHOS 193* 182*  --  188*   ALT 9* 7*  --  8*   AST 21 18  --  21   ALBUMIN 3.3* 2.9*  --  3.2*   PROT 9.2* 8.5*  --  9.0*   BILITOT 0.8 0.6  --  0.7   INR 1.9* 2.0* 2.0*  --       Recent Labs     07/12/19  0438   *     Recent Labs   Lab 07/02/19  2215   HGBA1C 6.0*       Scheduled Meds:   aspirin  81 mg Oral Daily    ergocalciferol  50,000 Units Oral Q7 Days    ferrous sulfate  325 mg Oral Daily    furosemide  80 mg Oral BID    gabapentin  100 mg Oral BID    isosorbide-hydrALAZINE 20-37.5 mg  1 tablet Oral BID    methocarbamol  500 mg Oral BID PC    methyl salicylate-menthol 15-10%   Topical (Top) TID    metoprolol tartrate  25 mg Oral Q6H    pantoprazole  40 mg Oral Daily    polyethylene glycol  17 g Oral BID    senna-docusate 8.6-50 mg  1 tablet Oral BID    warfarin  7.5 mg Oral Daily     Continuous Infusions:    As Needed:  albuterol, calcium carbonate, diphenhydrAMINE, HYDROmorphone, nitroGLYCERIN, ramelteon, sodium chloride 0.9%, sodium chloride 0.9%    Active Hospital Problems    Diagnosis  POA    *Acute on chronic combined systolic and diastolic heart failure [I50.43]  Yes    Atrial fibrillation, chronic [I48.2]  Yes     Priority: Low     Chronic    Essential hypertension [I10]  Yes     Priority: Low      Chronic    Dysphagia for pills, idiopathic [R13.10]  Yes     Patient chews pills and opens capsules -- avoid sustained-release formulations.      Shoulder pain, right [M25.511]  Yes    Weakness of lower extremity [R29.898]  Yes    Acute pain of right shoulder due to trauma [M25.511, G89.11]  Yes    Neuropathy of both feet [G57.93]  Yes    Prediabetes [R73.03]  Yes    Venous stasis of lower extremity [I87.8]  Yes    Impaired mobility [Z74.09]  Yes    Elevated alkaline phosphatase level [R74.8]  Yes    JEAN-PIERRE (acute kidney injury) [N17.9]  Yes    Chronic kidney disease [N18.9]  Yes     Chronic    Lumbar back pain [M54.5]  Yes    Personal history of venous thrombosis and embolism [Z86.718]  Not Applicable    Chronic anticoagulation [Z79.01]  Not Applicable     Chronic    Non compliance w medication regimen [Z91.14]  Not Applicable     Chronic    History of CVA (cerebrovascular accident) [Z86.73]  Not Applicable     Personal account, occurred in 2008/2009 at [facility].        Resolved Hospital Problems    Diagnosis Date Resolved POA    Febrile illness, acute [R50.9] 07/09/2019 Yes       Overview of Hospital Course:  54 yo M with combined CHF (Echo 4/2019 with EF 23%, diastolic dysfuction, moderated MR and TR), HTN, chronic A-fib (on Coumadin), A-flutter s/p ablation, CVA (in 2009), CAD, ?PE (patient reported), non-compliance presenting with musculoskeletal complaints and weakness admitted for acute on chronic combined heart failure and JEAN-PIERRE.    Assessment and Plan:    Acute on Chronic Combined systolic/diastolic heart failure   -Cardiology consulted, appreciate assistance  -continued Lasix infusion until 7/8  -Repeat ECHO ordered; cancelled by Cardiology  -Converted diuresis to home oral dose 7/9 as per Cardiology's recommendations.  -sCr increased; continued more aggressive diuresis.  -less diuresis noted with change from IV to oral Lasix; sCr improved.     Right sided weakness & acute pain   -IV  opiates discontinued, on oral morphine PRN, changed to liquid due to history of chewing pills.  -Gabapentin renal-dose ordered, initially refusing but now taking, titrate dose as tolerated.    -etiology for pian and weakness remains unclear, s/p MRI Brain/C/T/L spine  -Patient has chronic elevation of Uric Acid, prior synovial fluid studies negative for gout/pseudogout crystals > trial of empiric Colchicine 0.6mg X 1 did not improve symptoms.   -s/p Right shoulder arthrocentesis - gram stain negative, culture Aerobic with No Growth  -Neurology consulted - evaluated, concern for non-pathologic pattern/etiology of pain and weakness    -Continue OT/PT; plan for SNF  -complaints of migratory muscle pain persists     Febrile Illness - resolved  -febrile a few hours after admission on 7/3 - 7/4 . Afebrile since.   -Procal elevated, blood cultures negative  -initial UA w/o signs of infection  -CXR w/o signs of new infiltrate  -Empiric Vanc & Zosyn discontinued  -synovial fluid cultures show no growth so far     Atrial Fibrillation   -Beta-blocker changed to Lopressor 25mg q6hrs.   -Therapeutic Lovenox with INR low  -warfarin at 10mg dosing, PharmD consulted..   -Avoiding sustained-release BB (Toprol) due to patient's habit of chewing pills.  -episodes of V-tach and PVCs; EKG pending     JEAN-PIERRE on CKD   -bladder scans  -Suspect cardiorenal syndrome. Initial Urine lytes both FeNa 0.4% and FeUrea 14% represent Pre-renal etiology in this case.   -Continued diuresis per Cardiology's recommendations until appeared intravascularly dry.  -sCr and BUN increased further; consulted Nephrology. Continuing diuresis, work up in progress.  -sCr improving; Continuing current management as per Nephrology's recommendations. Work up in progress.     Hx of VTE  -Coumadin 10mg   -subtherapeutic INR  -continued Therapeutic Lovenox until INR increased; now discontinued.  -PharmD consulted for Coumadin management.     Anemia of iron  deficiency  -normal ferritin but low serum iron and saturation   -Pt declines blood product transfusion, refusal signed  -repeat iron studies/retic - retic count low, serum and saturated iron very low. Oral iron supplementation started.  -repeat H&H and CBC ordered but not done. No evidence of bleeding.      High Risk Conditions  Patient is currently on drug therapy requiring intensive monitoring for toxicity: Warfarin     Diet:  Diet [facility]; Fluid - 1500mL  GI Prophylaxis: Continued, chronic acid suppression therapy as OP  DVT Prophylaxis:     Anticoagulants   Medication Route Frequency    warfarin (COUMADIN) tablet 7.5 mg Oral Daily     Lines/ Drains/ Airways:  PIV  Urinary Catheter Indicated: Patient Does Not Have Urinary Catheter  Other Lines/Tubes/Drains:  Wounds:  Venous stasis ulcers    Goals of Care: Routine interventions  Discharge plan:  Skilled Nursing Facility    Oly Welsh MD  Department of Hospital Medicine  [id number]

## 2019-07-13 LAB
ALBUMIN SERPL BCP-MCNC: 3.4 G/DL (ref 3.5–5.2)
ALP SERPL-CCNC: 205 U/L (ref 55–135)
ALT SERPL W/O P-5'-P-CCNC: 9 U/L (ref 10–44)
ANION GAP SERPL CALC-SCNC: 17 MMOL/L (ref 8–16)
AST SERPL-CCNC: 20 U/L (ref 10–40)
BASOPHILS # BLD AUTO: 0.03 K/UL (ref 0–0.2)
BASOPHILS NFR BLD: 0.4 % (ref 0–1.9)
BILIRUB SERPL-MCNC: 0.7 MG/DL (ref 0.1–1)
BUN SERPL-MCNC: 79 MG/DL (ref 6–20)
CALCIUM SERPL-MCNC: 10 MG/DL (ref 8.7–10.5)
CHLORIDE SERPL-SCNC: 88 MMOL/L (ref 95–110)
CO2 SERPL-SCNC: 31 MMOL/L (ref 23–29)
CREAT SERPL-MCNC: 2.2 MG/DL (ref 0.5–1.4)
DIFFERENTIAL METHOD: ABNORMAL
EOSINOPHIL # BLD AUTO: 0.2 K/UL (ref 0–0.5)
EOSINOPHIL NFR BLD: 3.1 % (ref 0–8)
ERYTHROCYTE [DISTWIDTH] IN BLOOD BY AUTOMATED COUNT: 16.6 % (ref 11.5–14.5)
EST. GFR  (AFRICAN AMERICAN): 38.4 ML/MIN/1.73 M^2
EST. GFR  (NON AFRICAN AMERICAN): 33.2 ML/MIN/1.73 M^2
GLUCOSE SERPL-MCNC: 191 MG/DL (ref 70–110)
HCT VFR BLD AUTO: 24.4 % (ref 40–54)
HGB BLD-MCNC: 7.2 G/DL (ref 14–18)
IMM GRANULOCYTES # BLD AUTO: 0.05 K/UL (ref 0–0.04)
IMM GRANULOCYTES NFR BLD AUTO: 0.7 % (ref 0–0.5)
INR PPP: 2.2 (ref 0.8–1.2)
LYMPHOCYTES # BLD AUTO: 1 K/UL (ref 1–4.8)
LYMPHOCYTES NFR BLD: 13.4 % (ref 18–48)
MAGNESIUM SERPL-MCNC: 2.3 MG/DL (ref 1.6–2.6)
MCH RBC QN AUTO: 23.6 PG (ref 27–31)
MCHC RBC AUTO-ENTMCNC: 29.5 G/DL (ref 32–36)
MCV RBC AUTO: 80 FL (ref 82–98)
MONOCYTES # BLD AUTO: 1.2 K/UL (ref 0.3–1)
MONOCYTES NFR BLD: 17.2 % (ref 4–15)
NEUTROPHILS # BLD AUTO: 4.7 K/UL (ref 1.8–7.7)
NEUTROPHILS NFR BLD: 65.2 % (ref 38–73)
NRBC BLD-RTO: 0 /100 WBC
PHOSPHATE SERPL-MCNC: 3.2 MG/DL (ref 2.7–4.5)
PLATELET # BLD AUTO: 337 K/UL (ref 150–350)
PMV BLD AUTO: 9.8 FL (ref 9.2–12.9)
POTASSIUM SERPL-SCNC: 3.2 MMOL/L (ref 3.5–5.1)
PROT SERPL-MCNC: 9.2 G/DL (ref 6–8.4)
PROTHROMBIN TIME: 20.9 SEC (ref 9–12.5)
RBC # BLD AUTO: 3.05 M/UL (ref 4.6–6.2)
SODIUM SERPL-SCNC: 136 MMOL/L (ref 136–145)
WBC # BLD AUTO: 7.14 K/UL (ref 3.9–12.7)

## 2019-07-13 PROCEDURE — 84100 ASSAY OF PHOSPHORUS: CPT

## 2019-07-13 PROCEDURE — 25000003 PHARM REV CODE 250: Performed by: PHYSICIAN ASSISTANT

## 2019-07-13 PROCEDURE — 85610 PROTHROMBIN TIME: CPT

## 2019-07-13 PROCEDURE — 25000003 PHARM REV CODE 250: Performed by: INTERNAL MEDICINE

## 2019-07-13 PROCEDURE — 99232 PR SUBSEQUENT HOSPITAL CARE,LEVL II: ICD-10-PCS | Mod: ,,, | Performed by: INTERNAL MEDICINE

## 2019-07-13 PROCEDURE — 20600001 HC STEP DOWN PRIVATE ROOM

## 2019-07-13 PROCEDURE — 36415 COLL VENOUS BLD VENIPUNCTURE: CPT

## 2019-07-13 PROCEDURE — 25000003 PHARM REV CODE 250: Performed by: HOSPITALIST

## 2019-07-13 PROCEDURE — 85025 COMPLETE CBC W/AUTO DIFF WBC: CPT

## 2019-07-13 PROCEDURE — 99232 SBSQ HOSP IP/OBS MODERATE 35: CPT | Mod: ,,, | Performed by: INTERNAL MEDICINE

## 2019-07-13 PROCEDURE — 83735 ASSAY OF MAGNESIUM: CPT

## 2019-07-13 PROCEDURE — 25000003 PHARM REV CODE 250: Performed by: STUDENT IN AN ORGANIZED HEALTH CARE EDUCATION/TRAINING PROGRAM

## 2019-07-13 PROCEDURE — 80053 COMPREHEN METABOLIC PANEL: CPT

## 2019-07-13 RX ORDER — OXYMETAZOLINE HCL 0.05 %
2 SPRAY, NON-AEROSOL (ML) NASAL 2 TIMES DAILY PRN
Status: DISPENSED | OUTPATIENT
Start: 2019-07-13 | End: 2019-07-16

## 2019-07-13 RX ORDER — OXYMETAZOLINE HCL 0.05 %
2 SPRAY, NON-AEROSOL (ML) NASAL 2 TIMES DAILY PRN
Status: DISCONTINUED | OUTPATIENT
Start: 2019-07-13 | End: 2019-07-13

## 2019-07-13 RX ORDER — POTASSIUM CHLORIDE 20 MEQ/15ML
40 SOLUTION ORAL ONCE
Status: DISCONTINUED | OUTPATIENT
Start: 2019-07-13 | End: 2019-07-13

## 2019-07-13 RX ADMIN — METOPROLOL TARTRATE 25 MG: 25 TABLET, FILM COATED ORAL at 11:07

## 2019-07-13 RX ADMIN — MENTHOL, METHYL SALICYLATE: 10; 15 CREAM TOPICAL at 03:07

## 2019-07-13 RX ADMIN — METHOCARBAMOL TABLETS 500 MG: 500 TABLET, COATED ORAL at 08:07

## 2019-07-13 RX ADMIN — METOPROLOL TARTRATE 25 MG: 25 TABLET, FILM COATED ORAL at 05:07

## 2019-07-13 RX ADMIN — FERROUS SULFATE TAB EC 325 MG (65 MG FE EQUIVALENT) 325 MG: 325 (65 FE) TABLET DELAYED RESPONSE at 08:07

## 2019-07-13 RX ADMIN — WARFARIN SODIUM 7.5 MG: 7.5 TABLET ORAL at 05:07

## 2019-07-13 RX ADMIN — HYDRALAZINE HYDROCHLORIDE AND ISOSORBIDE DINITRATE 1 TABLET: 37.5; 2 TABLET, FILM COATED ORAL at 08:07

## 2019-07-13 RX ADMIN — MENTHOL, METHYL SALICYLATE: 10; 15 CREAM TOPICAL at 08:07

## 2019-07-13 RX ADMIN — ASPIRIN 81 MG: 81 TABLET, COATED ORAL at 08:07

## 2019-07-13 RX ADMIN — HYDRALAZINE HYDROCHLORIDE AND ISOSORBIDE DINITRATE 1 TABLET: 37.5; 2 TABLET, FILM COATED ORAL at 09:07

## 2019-07-13 RX ADMIN — HYDROMORPHONE HYDROCHLORIDE 1 MG: 1 SOLUTION ORAL at 11:07

## 2019-07-13 RX ADMIN — FUROSEMIDE 80 MG: 80 TABLET ORAL at 05:07

## 2019-07-13 RX ADMIN — MENTHOL, METHYL SALICYLATE: 10; 15 CREAM TOPICAL at 09:07

## 2019-07-13 RX ADMIN — GABAPENTIN 100 MG: 250 SOLUTION ORAL at 09:07

## 2019-07-13 RX ADMIN — FUROSEMIDE 80 MG: 80 TABLET ORAL at 08:07

## 2019-07-13 RX ADMIN — PANTOPRAZOLE SODIUM 40 MG: 40 TABLET, DELAYED RELEASE ORAL at 08:07

## 2019-07-13 RX ADMIN — GABAPENTIN 100 MG: 250 SOLUTION ORAL at 08:07

## 2019-07-13 RX ADMIN — POTASSIUM BICARBONATE 50 MEQ: 978 TABLET, EFFERVESCENT ORAL at 11:07

## 2019-07-13 RX ADMIN — METHOCARBAMOL TABLETS 500 MG: 500 TABLET, COATED ORAL at 09:07

## 2019-07-13 NOTE — PROGRESS NOTES
"Hospital Medicine  Progress Note    Team: [facility] [place] [facility] D Oly Welsh MD  Admit Date: 7/2/2019  DELMI 7/16/2019  Length of Stay:  LOS: 9 days   Code status: Full Code    Principal Problem:  Acute on chronic combined systolic and diastolic heart failure    HPI:  "52 yo M with combined CHF (Echo 4/2019 with EF 23%, diastolic dysfuction, moderated MR and TR), HTN, chronic A-fib (on Coumadin), A-flutter s/p ablation, CVA (in 2009), CAD, ?PE (patient reported), non-compliance, and drug seeking behavior presented right shoulder/arm pain following a fall injury associated with right leg weakness. Patient reports waking up to go to the bathroom during the night approximately 4 days ago when he was unable to bare weight on right leg due to weakness. He attempted to get back in the bed and aggravated/injured his right shoulder climbing back into bed. He was hoping the pain would resolve on its own but it has persisted for past 4 days prompting him to come to ED. He reports he can move his right upper extremity but refuses to because he does not want to illicit the pain."    Interval history: Patient now able to move his R LE.  H&H stable    Review of Systems   Constitutional: Negative for fever.   Cardiovascular: Negative for chest pain.   Musculoskeletal: Positive for joint pain.       Physical Exam  Physical Exam   Constitutional: No distress.   Eyes: Conjunctivae and lids are normal.   Neck: JVD present.   Cardiovascular: S1 normal and S2 normal.   Pulmonary/Chest: Effort normal. He has rales.   Abdominal: Soft. Normal appearance and bowel sounds are normal.   Musculoskeletal: He exhibits edema.        Right shoulder: He exhibits decreased range of motion, tenderness and pain.   Neurological: He is alert. He is not disoriented.       Temp:  [97.6 °F (36.4 °C)-98.3 °F (36.8 °C)]   Pulse:  [65-91]   Resp:  [16-18]   BP: ()/(54-95)   SpO2:  [94 %-98 %]     Intake/Output Summary (Last 24 hours) at 7/13/2019 1130  Last " data filed at 7/13/2019 0500  Gross per 24 hour   Intake 360 ml   Output 1250 ml   Net -890 ml       Recent Labs   Lab 07/08/19  0511 07/11/19  0341 07/13/19  0524   WBC 6.99 5.66 7.14   HGB 7.5* 6.7* 7.2*   HCT 24.5* 22.3* 24.4*    265 337     Recent Labs   Lab 07/11/19  0341 07/12/19  0438 07/13/19  0524   * 136 136   K 3.5 3.6 3.2*   CL 90* 89* 88*   CO2 30* 30* 31*   BUN 78* 80* 79*   CREATININE 2.7* 2.2* 2.2*   * 130* 191*   CALCIUM 9.7 10.0 10.0   MG 2.4 2.4 2.3   PHOS  --   --  3.2     Recent Labs   Lab 07/11/19  0341 07/12/19  0437 07/12/19  0438 07/13/19  0524   ALKPHOS 182*  --  188* 205*   ALT 7*  --  8* 9*   AST 18  --  21 20   ALBUMIN 2.9*  --  3.2* 3.4*   PROT 8.5*  --  9.0* 9.2*   BILITOT 0.6  --  0.7 0.7   INR 2.0* 2.0*  --  2.2*      Recent Labs     07/12/19  0438   *     Recent Labs   Lab 07/02/19  2215   HGBA1C 6.0*       Scheduled Meds:   aspirin  81 mg Oral Daily    ergocalciferol  50,000 Units Oral Q7 Days    ferrous sulfate  325 mg Oral Daily    furosemide  80 mg Oral BID    gabapentin  100 mg Oral BID    isosorbide-hydrALAZINE 20-37.5 mg  1 tablet Oral BID    methocarbamol  500 mg Oral BID PC    methyl salicylate-menthol 15-10%   Topical (Top) TID    metoprolol tartrate  25 mg Oral Q6H    pantoprazole  40 mg Oral Daily    polyethylene glycol  17 g Oral BID    potassium bicarbonate  50 mEq Oral Once    senna-docusate 8.6-50 mg  1 tablet Oral BID    warfarin  7.5 mg Oral Daily     Continuous Infusions:    As Needed:  albuterol, calcium carbonate, diphenhydrAMINE, HYDROmorphone, nitroGLYCERIN, ramelteon, sodium chloride 0.9%, sodium chloride 0.9%    Active Hospital Problems    Diagnosis  POA    *Acute on chronic combined systolic and diastolic heart failure [I50.43]  Yes    Atrial fibrillation, chronic [I48.2]  Yes     Priority: Low     Chronic    Essential hypertension [I10]  Yes     Priority: Low     Chronic    Dysphagia for pills, idiopathic  [R13.10]  Yes     Patient chews pills and opens capsules -- avoid sustained-release formulations.      Shoulder pain, right [M25.511]  Yes    Weakness of lower extremity [R29.898]  Yes    Acute pain of right shoulder due to trauma [M25.511, G89.11]  Yes    Neuropathy of both feet [G57.93]  Yes    Prediabetes [R73.03]  Yes    Venous stasis of lower extremity [I87.8]  Yes    Impaired mobility [Z74.09]  Yes    Elevated alkaline phosphatase level [R74.8]  Yes    JEAN-PIERRE (acute kidney injury) [N17.9]  Yes    Chronic kidney disease [N18.9]  Yes     Chronic    Lumbar back pain [M54.5]  Yes    Personal history of venous thrombosis and embolism [Z86.718]  Not Applicable    Chronic anticoagulation [Z79.01]  Not Applicable     Chronic    Non compliance w medication regimen [Z91.14]  Not Applicable     Chronic    History of CVA (cerebrovascular accident) [Z86.73]  Not Applicable     Personal account, occurred in 2008/2009 at [facility].        Resolved Hospital Problems    Diagnosis Date Resolved POA    Febrile illness, acute [R50.9] 07/09/2019 Yes       Overview of Hospital Course:  52 yo M with combined CHF (Echo 4/2019 with EF 23%, diastolic dysfuction, moderated MR and TR), HTN, chronic A-fib (on Coumadin), A-flutter s/p ablation, CVA (in 2009), CAD, ?PE (patient reported), non-compliance presenting with musculoskeletal complaints and weakness admitted for acute on chronic combined heart failure and JEAN-PIERRE.    Assessment and Plan:    Acute on Chronic Combined systolic/diastolic heart failure   -Cardiology consulted, appreciate assistance  -continued Lasix infusion until 7/8  -Repeat ECHO ordered; cancelled by Cardiology  -Converted diuresis to home oral dose 7/9 as per Cardiology's recommendations.  -sCr increased; continued more aggressive diuresis.  -less diuresis noted with change from IV to oral Lasix; sCr improved and stable.     Right sided weakness & acute pain   -IV opiates discontinued, on oral  morphine PRN, changed to liquid due to history of chewing pills.  -Gabapentin renal-dose ordered, initially refusing but now taking, titrate dose as tolerated.    -etiology for pian and weakness remains unclear, s/p MRI Brain/C/T/L spine  -Patient has chronic elevation of Uric Acid, prior synovial fluid studies negative for gout/pseudogout crystals > trial of empiric Colchicine 0.6mg X 1 did not improve symptoms.   -s/p Right shoulder arthrocentesis - gram stain negative, culture Aerobic with No Growth  -Neurology consulted - evaluated, concern for non-pathologic pattern/etiology of pain and weakness    -Continue OT/PT; plan for SNF  -complaints of migratory muscle pain persists; continue OT/PT     Febrile Illness - resolved  -febrile a few hours after admission on 7/3 - 7/4 . Afebrile since.   -Procal elevated, blood cultures negative  -initial UA w/o signs of infection  -CXR w/o signs of new infiltrate  -Empiric Vanc & Zosyn discontinued  -synovial fluid cultures show no growth so far     Atrial Fibrillation   -Beta-blocker changed to Lopressor 25mg q6hrs.   -Therapeutic Lovenox with INR low  -warfarin at 10mg dosing, PharmD consulted..   -Avoiding sustained-release BB (Toprol) due to patient's habit of chewing pills.  -episodes of V-tach and PVCs; EKG showed A-fib with PACs.     JEAN-PIERRE on CKD   -bladder scans  -Suspect cardiorenal syndrome. Initial Urine lytes both FeNa 0.4% and FeUrea 14% represent Pre-renal etiology in this case.   -Continued diuresis per Cardiology's recommendations until appeared intravascularly dry.  -sCr and BUN increased further; consulted Nephrology. Continuing diuresis, work up in progress.  -sCr improving; Continuing current management as per Nephrology's recommendations. Work up in progress.     Hx of VTE  -Coumadin 10mg   -subtherapeutic INR  -continued Therapeutic Lovenox until INR increased; now discontinued.  -PharmD consulted for Coumadin management.     Anemia of iron  deficiency  -normal ferritin but low serum iron and saturation   -Pt declines blood product transfusion, refusal signed  -repeat iron studies/retic - retic count low, serum and saturated iron very low. Oral iron supplementation started.  -repeat H&H and CBC ordered but not done. No evidence of bleeding.  -H&H stable      High Risk Conditions  Patient is currently on drug therapy requiring intensive monitoring for toxicity: Warfarin     Diet:  Diet Cardiac [facility]; Fluid - 1500mL  GI Prophylaxis: Continued, chronic acid suppression therapy as OP  DVT Prophylaxis:     Anticoagulants   Medication Route Frequency    warfarin (COUMADIN) tablet 7.5 mg Oral Daily     Lines/ Drains/ Airways:  PIV  Urinary Catheter Indicated: Patient Does Not Have Urinary Catheter  Other Lines/Tubes/Drains:  Wounds:  Venous stasis ulcers    Goals of Care: Routine interventions  Discharge plan:  Skilled Nursing Facility    Oly Welsh MD  Department of Hospital Medicine  [id number]

## 2019-07-13 NOTE — PROGRESS NOTES
Pt has had 2 rounds of VTACH. Explained to pt that labs will possibly need to get drawn once team notified. Pt already refusing to get stuck despite explained risks. Team paged.

## 2019-07-13 NOTE — PROGRESS NOTES
Patient's K was 3.2 this AM. MD nelson notified. Ordered 40 meq K replacement liquid. Patient tried it and decided he could not tolerate it. Patient then stated he takes the potassium bicarb at home. MD ordered 50 meq of potassium bicarbonate replacement to be given. Will continue to monitor.

## 2019-07-13 NOTE — PLAN OF CARE
Problem: Adult Inpatient Plan of Care  Goal: Plan of Care Review  Outcome: Ongoing (interventions implemented as appropriate)  Plan of care discussed with patient. Patient is free of fall/trauma/injury. Denies CP, SOB. Patient complaining of right hand pain and hand is swollen ice pack and elevation applied. Diuresing with oral lasix.  Afib on the monitor. VTACH on monitor. Refusing labs.  All questions addressed. Will continue to monitor

## 2019-07-13 NOTE — PLAN OF CARE
"Problem: Adult Inpatient Plan of Care  Goal: Patient-Specific Goal (Individualization)  Plan of care discussed with patient. Patient is free of fall/trauma/injury. Denies CP, SOB, or pain/discomfort. All questions addressed. Pt stated, "I scratched my back where they keep giving me those injections and it's bleeding." Pt had a skin tear on his left lower back, covered with bandaid. Pt is not getting any injections. Potassium replaced in AM. Will continue to monitor      "

## 2019-07-14 LAB
ALBUMIN SERPL BCP-MCNC: 3.3 G/DL (ref 3.5–5.2)
ALP SERPL-CCNC: 199 U/L (ref 55–135)
ALT SERPL W/O P-5'-P-CCNC: 10 U/L (ref 10–44)
ANION GAP SERPL CALC-SCNC: 17 MMOL/L (ref 8–16)
AST SERPL-CCNC: 24 U/L (ref 10–40)
BASOPHILS # BLD AUTO: 0.04 K/UL (ref 0–0.2)
BASOPHILS NFR BLD: 0.5 % (ref 0–1.9)
BILIRUB SERPL-MCNC: 0.7 MG/DL (ref 0.1–1)
BUN SERPL-MCNC: 84 MG/DL (ref 6–20)
CALCIUM SERPL-MCNC: 10.2 MG/DL (ref 8.7–10.5)
CHLORIDE SERPL-SCNC: 88 MMOL/L (ref 95–110)
CO2 SERPL-SCNC: 31 MMOL/L (ref 23–29)
CREAT SERPL-MCNC: 2 MG/DL (ref 0.5–1.4)
DIFFERENTIAL METHOD: ABNORMAL
EOSINOPHIL # BLD AUTO: 0.2 K/UL (ref 0–0.5)
EOSINOPHIL NFR BLD: 3 % (ref 0–8)
ERYTHROCYTE [DISTWIDTH] IN BLOOD BY AUTOMATED COUNT: 16.8 % (ref 11.5–14.5)
EST. GFR  (AFRICAN AMERICAN): 43.1 ML/MIN/1.73 M^2
EST. GFR  (NON AFRICAN AMERICAN): 37.3 ML/MIN/1.73 M^2
GLUCOSE SERPL-MCNC: 136 MG/DL (ref 70–110)
HCT VFR BLD AUTO: 22.9 % (ref 40–54)
HGB BLD-MCNC: 7 G/DL (ref 14–18)
IMM GRANULOCYTES # BLD AUTO: 0.04 K/UL (ref 0–0.04)
IMM GRANULOCYTES NFR BLD AUTO: 0.5 % (ref 0–0.5)
INR PPP: 2.2 (ref 0.8–1.2)
LYMPHOCYTES # BLD AUTO: 1 K/UL (ref 1–4.8)
LYMPHOCYTES NFR BLD: 13.6 % (ref 18–48)
MAGNESIUM SERPL-MCNC: 2.3 MG/DL (ref 1.6–2.6)
MCH RBC QN AUTO: 23.9 PG (ref 27–31)
MCHC RBC AUTO-ENTMCNC: 30.6 G/DL (ref 32–36)
MCV RBC AUTO: 78 FL (ref 82–98)
MONOCYTES # BLD AUTO: 1.4 K/UL (ref 0.3–1)
MONOCYTES NFR BLD: 18.3 % (ref 4–15)
NEUTROPHILS # BLD AUTO: 4.7 K/UL (ref 1.8–7.7)
NEUTROPHILS NFR BLD: 64.1 % (ref 38–73)
NRBC BLD-RTO: 0 /100 WBC
PHOSPHATE SERPL-MCNC: 3.1 MG/DL (ref 2.7–4.5)
PLATELET # BLD AUTO: 322 K/UL (ref 150–350)
PMV BLD AUTO: 9.8 FL (ref 9.2–12.9)
POTASSIUM SERPL-SCNC: 3.5 MMOL/L (ref 3.5–5.1)
PROT SERPL-MCNC: 8.9 G/DL (ref 6–8.4)
PROTHROMBIN TIME: 21 SEC (ref 9–12.5)
RBC # BLD AUTO: 2.93 M/UL (ref 4.6–6.2)
SODIUM SERPL-SCNC: 136 MMOL/L (ref 136–145)
WBC # BLD AUTO: 7.37 K/UL (ref 3.9–12.7)

## 2019-07-14 PROCEDURE — 25000003 PHARM REV CODE 250: Performed by: PHYSICIAN ASSISTANT

## 2019-07-14 PROCEDURE — 83735 ASSAY OF MAGNESIUM: CPT

## 2019-07-14 PROCEDURE — 20600001 HC STEP DOWN PRIVATE ROOM

## 2019-07-14 PROCEDURE — 36415 COLL VENOUS BLD VENIPUNCTURE: CPT

## 2019-07-14 PROCEDURE — 99232 PR SUBSEQUENT HOSPITAL CARE,LEVL II: ICD-10-PCS | Mod: ,,, | Performed by: INTERNAL MEDICINE

## 2019-07-14 PROCEDURE — 85025 COMPLETE CBC W/AUTO DIFF WBC: CPT

## 2019-07-14 PROCEDURE — 85610 PROTHROMBIN TIME: CPT

## 2019-07-14 PROCEDURE — 25000003 PHARM REV CODE 250: Performed by: STUDENT IN AN ORGANIZED HEALTH CARE EDUCATION/TRAINING PROGRAM

## 2019-07-14 PROCEDURE — 80053 COMPREHEN METABOLIC PANEL: CPT

## 2019-07-14 PROCEDURE — 99232 SBSQ HOSP IP/OBS MODERATE 35: CPT | Mod: ,,, | Performed by: INTERNAL MEDICINE

## 2019-07-14 PROCEDURE — 25000003 PHARM REV CODE 250: Performed by: HOSPITALIST

## 2019-07-14 PROCEDURE — 25000003 PHARM REV CODE 250: Performed by: INTERNAL MEDICINE

## 2019-07-14 PROCEDURE — 84100 ASSAY OF PHOSPHORUS: CPT

## 2019-07-14 RX ADMIN — METOPROLOL TARTRATE 25 MG: 25 TABLET, FILM COATED ORAL at 04:07

## 2019-07-14 RX ADMIN — METOPROLOL TARTRATE 25 MG: 25 TABLET, FILM COATED ORAL at 05:07

## 2019-07-14 RX ADMIN — HYDRALAZINE HYDROCHLORIDE AND ISOSORBIDE DINITRATE 1 TABLET: 37.5; 2 TABLET, FILM COATED ORAL at 09:07

## 2019-07-14 RX ADMIN — Medication 2 SPRAY: at 12:07

## 2019-07-14 RX ADMIN — METHOCARBAMOL TABLETS 500 MG: 500 TABLET, COATED ORAL at 10:07

## 2019-07-14 RX ADMIN — GABAPENTIN 100 MG: 250 SOLUTION ORAL at 09:07

## 2019-07-14 RX ADMIN — FERROUS SULFATE TAB EC 325 MG (65 MG FE EQUIVALENT) 325 MG: 325 (65 FE) TABLET DELAYED RESPONSE at 09:07

## 2019-07-14 RX ADMIN — MENTHOL, METHYL SALICYLATE: 10; 15 CREAM TOPICAL at 09:07

## 2019-07-14 RX ADMIN — HYDROMORPHONE HYDROCHLORIDE 1 MG: 1 SOLUTION ORAL at 04:07

## 2019-07-14 RX ADMIN — FUROSEMIDE 80 MG: 80 TABLET ORAL at 08:07

## 2019-07-14 RX ADMIN — ASPIRIN 81 MG: 81 TABLET, COATED ORAL at 08:07

## 2019-07-14 RX ADMIN — METOPROLOL TARTRATE 25 MG: 25 TABLET, FILM COATED ORAL at 12:07

## 2019-07-14 RX ADMIN — GABAPENTIN 100 MG: 250 SOLUTION ORAL at 10:07

## 2019-07-14 RX ADMIN — FUROSEMIDE 80 MG: 80 TABLET ORAL at 05:07

## 2019-07-14 RX ADMIN — HYDROMORPHONE HYDROCHLORIDE 1 MG: 1 SOLUTION ORAL at 10:07

## 2019-07-14 RX ADMIN — POTASSIUM BICARBONATE 50 MEQ: 978 TABLET, EFFERVESCENT ORAL at 08:07

## 2019-07-14 RX ADMIN — MENTHOL, METHYL SALICYLATE: 10; 15 CREAM TOPICAL at 10:07

## 2019-07-14 RX ADMIN — MENTHOL, METHYL SALICYLATE: 10; 15 CREAM TOPICAL at 03:07

## 2019-07-14 RX ADMIN — METHOCARBAMOL TABLETS 500 MG: 500 TABLET, COATED ORAL at 08:07

## 2019-07-14 RX ADMIN — HYDRALAZINE HYDROCHLORIDE AND ISOSORBIDE DINITRATE 1 TABLET: 37.5; 2 TABLET, FILM COATED ORAL at 10:07

## 2019-07-14 NOTE — PROGRESS NOTES
Patient stated with AM rounds that he would not take his coumadin tonight, due to having a nose bleed three times yesterday. Informed him at the time about the importance of keeping his INR in therapeutic range, and that his coumadin is a very important medication to take to prevent blood clots. Patient once again stated that he did not want the medication. When his dose of coumadin was due, I approached the patient about taking his coumadin. Patient asked what it was, and when I informed him that it was his blood thinner for blood clots, patient stated that he would not take the medication. I tried to explain to him why it was important to take, and the patient once again stated that he would not take it and that he could not wait to get home and out of here. I informed the patient that compliance with medications leads to discharge. Notified MD Welsh.     Also, notified MD Welsh that the second bladder scan was 0.

## 2019-07-14 NOTE — PROGRESS NOTES
Pt had a nose bleed. Stated that it happens when he takes his Coumadin. Bleeding stopped by the time I entered the room.

## 2019-07-14 NOTE — PROGRESS NOTES
"Hospital Medicine  Progress Note    Team: [facility] [place]OSP MED D Oly Welsh MD  Admit Date: 7/2/2019  DELMI 7/16/2019  Length of Stay:  LOS: 10 days   Code status: Full Code    Principal Problem:  Acute on chronic combined systolic and diastolic heart failure    HPI:  "52 yo M with combined CHF (Echo 4/2019 with EF 23%, diastolic dysfuction, moderated MR and TR), HTN, chronic A-fib (on Coumadin), A-flutter s/p ablation, CVA (in 2009), CAD, ?PE (patient reported), non-compliance, and drug seeking behavior presented right shoulder/arm pain following a fall injury associated with right leg weakness. Patient reports waking up to go to the bathroom during the night approximately 4 days ago when he was unable to bare weight on right leg due to weakness. He attempted to get back in the bed and aggravated/injured his right shoulder climbing back into bed. He was hoping the pain would resolve on its own but it has persisted for past 4 days prompting him to come to ED. He reports he can move his right upper extremity but refuses to because he does not want to illicit the pain."    Interval history: Patient able to move his R LE.  Episodes of epistaxis overnight H&H stable. Worsening R hand pain and swelling.    Review of Systems   Constitutional: Negative for fever.   Cardiovascular: Positive for leg swelling. Negative for chest pain.   Musculoskeletal: Positive for joint pain.   Neurological: Positive for weakness.       Physical Exam  Physical Exam   Constitutional: No distress.   Eyes: Conjunctivae and lids are normal.   Neck: JVD present.   Cardiovascular: S1 normal and S2 normal. An irregular rhythm present.   Pulmonary/Chest: Effort normal and breath sounds normal.   Abdominal: Soft. Normal appearance and bowel sounds are normal.   Musculoskeletal: He exhibits edema.        Right shoulder: He exhibits decreased range of motion, tenderness and pain.        Right hand: He exhibits decreased range of motion, tenderness and " swelling.   Neurological: He is alert. He is not disoriented.       Temp:  [97.4 °F (36.3 °C)-98.6 °F (37 °C)]   Pulse:  [65-95]   Resp:  [16-18]   BP: ()/(53-65)   SpO2:  [95 %-98 %]     Intake/Output Summary (Last 24 hours) at 7/14/2019 0744  Last data filed at 7/14/2019 0441  Gross per 24 hour   Intake 740 ml   Output 1650 ml   Net -910 ml       Recent Labs   Lab 07/11/19  0341 07/13/19  0524 07/14/19  0503   WBC 5.66 7.14 7.37   HGB 6.7* 7.2* 7.0*   HCT 22.3* 24.4* 22.9*    337 322     Recent Labs   Lab 07/12/19  0438 07/13/19  0524 07/14/19  0503    136 136   K 3.6 3.2* 3.5   CL 89* 88* 88*   CO2 30* 31* 31*   BUN 80* 79* 84*   CREATININE 2.2* 2.2* 2.0*   * 191* 136*   CALCIUM 10.0 10.0 10.2   MG 2.4 2.3 2.3   PHOS  --  3.2 3.1     Recent Labs   Lab 07/12/19  0437 07/12/19  0438 07/13/19  0524 07/14/19  0503   ALKPHOS  --  188* 205* 199*   ALT  --  8* 9* 10   AST  --  21 20 24   ALBUMIN  --  3.2* 3.4* 3.3*   PROT  --  9.0* 9.2* 8.9*   BILITOT  --  0.7 0.7 0.7   INR 2.0*  --  2.2* 2.2*      Recent Labs     07/12/19 0438   *     Recent Labs   Lab 07/02/19  2215   HGBA1C 6.0*       Scheduled Meds:   aspirin  81 mg Oral Daily    ergocalciferol  50,000 Units Oral Q7 Days    ferrous sulfate  325 mg Oral Daily    furosemide  80 mg Oral BID    gabapentin  100 mg Oral BID    isosorbide-hydrALAZINE 20-37.5 mg  1 tablet Oral BID    methocarbamol  500 mg Oral BID PC    methyl salicylate-menthol 15-10%   Topical (Top) TID    metoprolol tartrate  25 mg Oral Q6H    pantoprazole  40 mg Oral Daily    polyethylene glycol  17 g Oral BID    senna-docusate 8.6-50 mg  1 tablet Oral BID    warfarin  7.5 mg Oral Daily     Continuous Infusions:    As Needed:  albuterol, calcium carbonate, diphenhydrAMINE, HYDROmorphone, nitroGLYCERIN, oxymetazoline, ramelteon, sodium chloride 0.9%, sodium chloride 0.9%    Active Hospital Problems    Diagnosis  POA    *Acute on chronic combined systolic  and diastolic heart failure [I50.43]  Yes    Atrial fibrillation, chronic [I48.2]  Yes     Priority: Low     Chronic    Essential hypertension [I10]  Yes     Priority: Low     Chronic    Dysphagia for pills, idiopathic [R13.10]  Yes     Patient chews pills and opens capsules -- avoid sustained-release formulations.      Shoulder pain, right [M25.511]  Yes    Weakness of lower extremity [R29.898]  Yes    Acute pain of right shoulder due to trauma [M25.511, G89.11]  Yes    Neuropathy of both feet [G57.93]  Yes    Prediabetes [R73.03]  Yes    Venous stasis of lower extremity [I87.8]  Yes    Impaired mobility [Z74.09]  Yes    Elevated alkaline phosphatase level [R74.8]  Yes    JEAN-PIERRE (acute kidney injury) [N17.9]  Yes    Chronic kidney disease [N18.9]  Yes     Chronic    Lumbar back pain [M54.5]  Yes    Personal history of venous thrombosis and embolism [Z86.718]  Not Applicable    Chronic anticoagulation [Z79.01]  Not Applicable     Chronic    Non compliance w medication regimen [Z91.14]  Not Applicable     Chronic    History of CVA (cerebrovascular accident) [Z86.73]  Not Applicable     Personal account, occurred in 2008/2009 at [facility].        Resolved Hospital Problems    Diagnosis Date Resolved POA    Febrile illness, acute [R50.9] 07/09/2019 Yes       Overview of Hospital Course:  54 yo M with combined CHF (Echo 4/2019 with EF 23%, diastolic dysfuction, moderated MR and TR), HTN, chronic A-fib (on Coumadin), A-flutter s/p ablation, CVA (in 2009), CAD, ?PE (patient reported), non-compliance presenting with musculoskeletal complaints and weakness admitted for acute on chronic combined heart failure and JEAN-PIERRE.    Assessment and Plan:    Acute on Chronic Combined systolic/diastolic heart failure   -Cardiology consulted, appreciate assistance  -continued Lasix infusion until 7/8  -Repeat ECHO ordered; cancelled by Cardiology  -Converted diuresis to home oral dose 7/9 as per Cardiology's  recommendations.  -sCr increased; continued more aggressive diuresis.  -less diuresis noted with change from IV to oral Lasix; sCr improved and stable.   -Continuing home Lasix regimen and fluid restriction     Right sided weakness & acute pain   -IV opiates discontinued, on oral morphine PRN, changed to liquid due to history of chewing pills.  -Gabapentin renal-dose ordered, initially refusing but now taking, titrate dose as tolerated.    -etiology for pian and weakness remains unclear, s/p MRI Brain/C/T/L spine  -Patient has chronic elevation of Uric Acid, prior synovial fluid studies negative for gout/pseudogout crystals > trial of empiric Colchicine 0.6mg X 1 did not improve symptoms.   -s/p Right shoulder arthrocentesis - gram stain negative, culture Aerobic with No Growth  -Neurology consulted: concern for non-pathologic pattern/etiology of pain and weakness    -Continue OT/PT; plan for SNF  -complaints of migratory muscle pain persists; continue OT/PT  -there has been intermittent swelling of the patient's right hand for the past few days as of 7/14 it is more swollen and painful with range of motion and has tenderness to palpation.  Obtaining plain films ESR and CRP; consulting Ortho.     Febrile Illness - resolved  -febrile a few hours after admission on 7/3 - 7/4 . Afebrile since.   -Procal elevated, blood cultures negative  -initial UA w/o signs of infection  -CXR w/o signs of new infiltrate  -Empiric Vanc & Zosyn discontinued  -synovial fluid cultures show no growth so far     Atrial Fibrillation, PAF   -Beta-blocker changed to Lopressor 25mg q6hrs.   -Therapeutic Lovenox with INR low  -warfarin at 10mg dosing, PharmD consulted..   -Avoiding sustained-release BB (Toprol) due to patient's habit of chewing pills.  -episodes of V-tach and PVCs; EKG showed A-fib with PACs.     JEAN-PIERRE on CKD   -bladder scans  -Suspect cardiorenal syndrome. Initial Urine lytes both FeNa 0.4% and FeUrea 14% represent Pre-renal  etiology in this case.   -Continued diuresis per Cardiology's recommendations until appeared intravascularly dry.  -sCr and BUN increased further; consulted Nephrology. Continuing diuresis, work up in progress.  -sCr improving; Continuing current management as per Nephrology's recommendations. Work up for paraproteinemia negative.     Hx of VTE  -Coumadin 10 mg   -subtherapeutic INR  -continued Therapeutic Lovenox until INR increased; now discontinued.  -PharmD consulted for Coumadin management.     Anemia of iron deficiency  -normal ferritin but low serum iron and saturation   -Pt declines blood product transfusion, refusal signed  -repeat iron studies/retic - retic count low, serum and saturated iron very low. Oral iron supplementation started.  -No evidence of GI bleeding.  Had episodes of epistaxis 7/14.  -H&H stable.  Continue iron supplementation.      High Risk Conditions  Patient is currently on drug therapy requiring intensive monitoring for toxicity: Warfarin     Diet:  Diet Cardiac [facility]; Fluid - 1500mL  GI Prophylaxis: Continued, chronic acid suppression therapy as OP  DVT Prophylaxis:     Anticoagulants   Medication Route Frequency    warfarin (COUMADIN) tablet 7.5 mg Oral Daily     Lines/ Drains/ Airways:  PIV  Urinary Catheter Indicated: Patient Does Not Have Urinary Catheter  Other Lines/Tubes/Drains:  Wounds:  Venous stasis ulcers    Goals of Care: Routine interventions; curative  Discharge plan:  Skilled Nursing Facility    Oly Welsh MD  Department of Hospital Medicine  [id number]

## 2019-07-14 NOTE — PLAN OF CARE
Patient remains free from falls and injuries through out shift. Patient AAO and VSS. Patient denies chest pain and SOB. Patient sat in chair all night. patient having c/o right foot and toe numbness. Patient has feeling in right leg. Patient states that he is unable to move RLE without assistances. Patient right hand is swollen. Ice provided to help with swelling. Patient has two episodes of nose bleeding on shift. Both episodes of nose bleeding lasted at least 5-10min max and AYESHA Regan notified. Patient refused efferin twice on shift, but later changed his mind. After efferin was given patient has not had anymore c/o of nose bleeding. Patient is on coumadin 7.5mg PO daily, last INR 2.2 yesterday (7/13). PRN pain medication given on shift d/t c/o of right hand, shoulder and leg pain. Patient has been in rate controlled A-Fib on shift. Lasix 80mg PO given BID, will f/u with labs this morning. Patient chews pills d/t patient having trouble swallowing per patient.  Plan of care reviewed with patient. Patient verbalizes understanding of plan.  Will continue to monitor.

## 2019-07-14 NOTE — PROGRESS NOTES
Spoke with MD Welsh about patient numbness in foot and inability to wiggle toes. Also spoke about patient requesting for lidocaine to be injected in his right hand for pain. Right hand still swollen. MD already aware of numbness due to peripheral neuropathy, Gabapentin already ordered. MD still wants elevation of arm.    MD informed of potassium 3.5. Orders given for replacement 50 meq potassium bicarb to be given now.    MD informed that patient does not want Coumadin due to nose bleeds overnight. MD aware of situation.    MD requested bladder scans to determine if patient has retention. Will continue to monitor.

## 2019-07-14 NOTE — PLAN OF CARE
Problem: Adult Inpatient Plan of Care  Goal: Plan of Care Review  Outcome: Revised  Plan of care discussed with patient. Patient is free of fall/trauma/injury. Denies CP, SOB. Has complaints of pain 10/10 ring finger right hand. Patient requested to have his finger splinted to help relieve the pain, as he stated the pain occurs when he moves his finger. RN advised against it and patient argued for it. Patient still wanted it and after RN left room, patient tightened co-band and popsicle stick around finger. No medications requested. Patient sitting up in chair. Patient chews all medications before swallowing with water. Hand Xray and orthopedic surgery consult for right hand. No fracture noted in xray. Patient refuses coumadin. All questions addressed. Will continue to monitor

## 2019-07-14 NOTE — PROGRESS NOTES
0909: Bladder scan completed this am after morning void, scan showed 46mL in bladder. Patient to be scanned later today. Will continue to monitor.    1700: Second bladder scan completed. 0mL in bladder after void.

## 2019-07-15 PROBLEM — S69.91XA INJURY OF RIGHT RING FINGER: Status: ACTIVE | Noted: 2019-07-15

## 2019-07-15 LAB
ALBUMIN SERPL BCP-MCNC: 3.2 G/DL (ref 3.5–5.2)
ALP SERPL-CCNC: 198 U/L (ref 55–135)
ALT SERPL W/O P-5'-P-CCNC: 11 U/L (ref 10–44)
ANION GAP SERPL CALC-SCNC: 20 MMOL/L (ref 8–16)
AST SERPL-CCNC: 23 U/L (ref 10–40)
BASOPHILS # BLD AUTO: 0.04 K/UL (ref 0–0.2)
BASOPHILS NFR BLD: 0.5 % (ref 0–1.9)
BILIRUB SERPL-MCNC: 0.7 MG/DL (ref 0.1–1)
BUN SERPL-MCNC: 84 MG/DL (ref 6–20)
CALCIUM SERPL-MCNC: 10.6 MG/DL (ref 8.7–10.5)
CHLORIDE SERPL-SCNC: 92 MMOL/L (ref 95–110)
CO2 SERPL-SCNC: 29 MMOL/L (ref 23–29)
CREAT SERPL-MCNC: 1.8 MG/DL (ref 0.5–1.4)
CRP SERPL-MCNC: 23.3 MG/L (ref 0–8.2)
DIFFERENTIAL METHOD: ABNORMAL
EOSINOPHIL # BLD AUTO: 0.3 K/UL (ref 0–0.5)
EOSINOPHIL NFR BLD: 3.8 % (ref 0–8)
ERYTHROCYTE [DISTWIDTH] IN BLOOD BY AUTOMATED COUNT: 17.1 % (ref 11.5–14.5)
ERYTHROCYTE [SEDIMENTATION RATE] IN BLOOD BY WESTERGREN METHOD: >120 MM/HR (ref 0–23)
EST. GFR  (AFRICAN AMERICAN): 48.9 ML/MIN/1.73 M^2
EST. GFR  (NON AFRICAN AMERICAN): 42.3 ML/MIN/1.73 M^2
GLUCOSE SERPL-MCNC: 109 MG/DL (ref 70–110)
GRAM STN SPEC: NORMAL
GRAM STN SPEC: NORMAL
HCT VFR BLD AUTO: 22.7 % (ref 40–54)
HGB BLD-MCNC: 6.7 G/DL (ref 14–18)
IMM GRANULOCYTES # BLD AUTO: 0.07 K/UL (ref 0–0.04)
IMM GRANULOCYTES NFR BLD AUTO: 0.8 % (ref 0–0.5)
INR PPP: 2 (ref 0.8–1.2)
LYMPHOCYTES # BLD AUTO: 1.3 K/UL (ref 1–4.8)
LYMPHOCYTES NFR BLD: 15.4 % (ref 18–48)
MAGNESIUM SERPL-MCNC: 2.3 MG/DL (ref 1.6–2.6)
MCH RBC QN AUTO: 23.8 PG (ref 27–31)
MCHC RBC AUTO-ENTMCNC: 29.5 G/DL (ref 32–36)
MCV RBC AUTO: 81 FL (ref 82–98)
MONOCYTES # BLD AUTO: 1.5 K/UL (ref 0.3–1)
MONOCYTES NFR BLD: 17 % (ref 4–15)
NEUTROPHILS # BLD AUTO: 5.3 K/UL (ref 1.8–7.7)
NEUTROPHILS NFR BLD: 62.5 % (ref 38–73)
NRBC BLD-RTO: 0 /100 WBC
PHOSPHATE SERPL-MCNC: 3.4 MG/DL (ref 2.7–4.5)
PLATELET # BLD AUTO: 327 K/UL (ref 150–350)
PMV BLD AUTO: 9.6 FL (ref 9.2–12.9)
POTASSIUM SERPL-SCNC: 3.8 MMOL/L (ref 3.5–5.1)
PROT SERPL-MCNC: 9 G/DL (ref 6–8.4)
PROTHROMBIN TIME: 19.8 SEC (ref 9–12.5)
RBC # BLD AUTO: 2.81 M/UL (ref 4.6–6.2)
SODIUM SERPL-SCNC: 141 MMOL/L (ref 136–145)
WBC # BLD AUTO: 8.52 K/UL (ref 3.9–12.7)

## 2019-07-15 PROCEDURE — 86140 C-REACTIVE PROTEIN: CPT

## 2019-07-15 PROCEDURE — 87102 FUNGUS ISOLATION CULTURE: CPT

## 2019-07-15 PROCEDURE — 99232 PR SUBSEQUENT HOSPITAL CARE,LEVL II: ICD-10-PCS | Mod: ,,, | Performed by: NURSE PRACTITIONER

## 2019-07-15 PROCEDURE — 87205 SMEAR GRAM STAIN: CPT

## 2019-07-15 PROCEDURE — 85652 RBC SED RATE AUTOMATED: CPT

## 2019-07-15 PROCEDURE — 83735 ASSAY OF MAGNESIUM: CPT

## 2019-07-15 PROCEDURE — 63600175 PHARM REV CODE 636 W HCPCS: Performed by: PHYSICIAN ASSISTANT

## 2019-07-15 PROCEDURE — 99232 PR SUBSEQUENT HOSPITAL CARE,LEVL II: ICD-10-PCS | Mod: ,,, | Performed by: INTERNAL MEDICINE

## 2019-07-15 PROCEDURE — 87070 CULTURE OTHR SPECIMN AEROBIC: CPT

## 2019-07-15 PROCEDURE — 87075 CULTR BACTERIA EXCEPT BLOOD: CPT

## 2019-07-15 PROCEDURE — 85025 COMPLETE CBC W/AUTO DIFF WBC: CPT

## 2019-07-15 PROCEDURE — 25000003 PHARM REV CODE 250: Performed by: STUDENT IN AN ORGANIZED HEALTH CARE EDUCATION/TRAINING PROGRAM

## 2019-07-15 PROCEDURE — 99232 SBSQ HOSP IP/OBS MODERATE 35: CPT | Mod: ,,, | Performed by: NURSE PRACTITIONER

## 2019-07-15 PROCEDURE — 80053 COMPREHEN METABOLIC PANEL: CPT

## 2019-07-15 PROCEDURE — 25000003 PHARM REV CODE 250: Performed by: INTERNAL MEDICINE

## 2019-07-15 PROCEDURE — 97116 GAIT TRAINING THERAPY: CPT

## 2019-07-15 PROCEDURE — 97530 THERAPEUTIC ACTIVITIES: CPT

## 2019-07-15 PROCEDURE — 85610 PROTHROMBIN TIME: CPT

## 2019-07-15 PROCEDURE — 20600001 HC STEP DOWN PRIVATE ROOM

## 2019-07-15 PROCEDURE — 25000003 PHARM REV CODE 250: Performed by: PHYSICIAN ASSISTANT

## 2019-07-15 PROCEDURE — 87116 MYCOBACTERIA CULTURE: CPT

## 2019-07-15 PROCEDURE — 99232 SBSQ HOSP IP/OBS MODERATE 35: CPT | Mod: ,,, | Performed by: INTERNAL MEDICINE

## 2019-07-15 PROCEDURE — 36415 COLL VENOUS BLD VENIPUNCTURE: CPT

## 2019-07-15 PROCEDURE — 84100 ASSAY OF PHOSPHORUS: CPT

## 2019-07-15 PROCEDURE — 87206 SMEAR FLUORESCENT/ACID STAI: CPT

## 2019-07-15 PROCEDURE — 25000003 PHARM REV CODE 250: Performed by: HOSPITALIST

## 2019-07-15 RX ORDER — LIDOCAINE HYDROCHLORIDE 10 MG/ML
20 INJECTION INFILTRATION; PERINEURAL ONCE
Status: COMPLETED | OUTPATIENT
Start: 2019-07-15 | End: 2019-07-15

## 2019-07-15 RX ORDER — MORPHINE SULFATE 2 MG/ML
4 INJECTION, SOLUTION INTRAMUSCULAR; INTRAVENOUS ONCE
Status: COMPLETED | OUTPATIENT
Start: 2019-07-15 | End: 2019-07-15

## 2019-07-15 RX ADMIN — METOPROLOL TARTRATE 25 MG: 25 TABLET, FILM COATED ORAL at 12:07

## 2019-07-15 RX ADMIN — MENTHOL, METHYL SALICYLATE: 10; 15 CREAM TOPICAL at 04:07

## 2019-07-15 RX ADMIN — LIDOCAINE HYDROCHLORIDE 20 ML: 10 INJECTION, SOLUTION INFILTRATION; PERINEURAL at 03:07

## 2019-07-15 RX ADMIN — METOPROLOL TARTRATE 25 MG: 25 TABLET, FILM COATED ORAL at 05:07

## 2019-07-15 RX ADMIN — GABAPENTIN 100 MG: 250 SOLUTION ORAL at 09:07

## 2019-07-15 RX ADMIN — METOPROLOL TARTRATE 25 MG: 25 TABLET, FILM COATED ORAL at 06:07

## 2019-07-15 RX ADMIN — MORPHINE SULFATE 4 MG: 2 INJECTION, SOLUTION INTRAMUSCULAR; INTRAVENOUS at 09:07

## 2019-07-15 RX ADMIN — HYDROMORPHONE HYDROCHLORIDE 1 MG: 1 SOLUTION ORAL at 11:07

## 2019-07-15 RX ADMIN — PANTOPRAZOLE SODIUM 40 MG: 40 TABLET, DELAYED RELEASE ORAL at 09:07

## 2019-07-15 RX ADMIN — MENTHOL, METHYL SALICYLATE: 10; 15 CREAM TOPICAL at 09:07

## 2019-07-15 RX ADMIN — SENNOSIDES,DOCUSATE SODIUM 1 TABLET: 8.6; 5 TABLET, FILM COATED ORAL at 09:07

## 2019-07-15 RX ADMIN — HYDROMORPHONE HYDROCHLORIDE 1 MG: 1 SOLUTION ORAL at 06:07

## 2019-07-15 RX ADMIN — FUROSEMIDE 80 MG: 80 TABLET ORAL at 06:07

## 2019-07-15 RX ADMIN — METOPROLOL TARTRATE 25 MG: 25 TABLET, FILM COATED ORAL at 11:07

## 2019-07-15 RX ADMIN — ASPIRIN 81 MG: 81 TABLET, COATED ORAL at 09:07

## 2019-07-15 RX ADMIN — METHOCARBAMOL TABLETS 500 MG: 500 TABLET, COATED ORAL at 09:07

## 2019-07-15 RX ADMIN — WARFARIN SODIUM 7.5 MG: 7.5 TABLET ORAL at 04:07

## 2019-07-15 RX ADMIN — FERROUS SULFATE TAB EC 325 MG (65 MG FE EQUIVALENT) 325 MG: 325 (65 FE) TABLET DELAYED RESPONSE at 09:07

## 2019-07-15 RX ADMIN — HYDRALAZINE HYDROCHLORIDE AND ISOSORBIDE DINITRATE 1 TABLET: 37.5; 2 TABLET, FILM COATED ORAL at 09:07

## 2019-07-15 RX ADMIN — FUROSEMIDE 80 MG: 80 TABLET ORAL at 09:07

## 2019-07-15 RX ADMIN — MENTHOL, METHYL SALICYLATE: 10; 15 CREAM TOPICAL at 10:07

## 2019-07-15 NOTE — PROGRESS NOTES
"Hospital Medicine  Progress Note    Team: [facility] [place] [facility]OSP MED D Oly Welsh MD  Admit Date: 7/2/2019  DELMI 7/16/2019  Length of Stay:  LOS: 11 days   Code status: Full Code    Principal Problem:  Acute on chronic combined systolic and diastolic heart failure    HPI:  "54 yo M with combined CHF (Echo 4/2019 with EF 23%, diastolic dysfuction, moderated MR and TR), HTN, chronic A-fib (on Coumadin), A-flutter s/p ablation, CVA (in 2009), CAD, ?PE (patient reported), non-compliance, and drug seeking behavior presented right shoulder/arm pain following a fall injury associated with right leg weakness. Patient reports waking up to go to the bathroom during the night approximately 4 days ago when he was unable to bare weight on right leg due to weakness. He attempted to get back in the bed and aggravated/injured his right shoulder climbing back into bed. He was hoping the pain would resolve on its own but it has persisted for past 4 days prompting him to come to ED. He reports he can move his right upper extremity but refuses to because he does not want to illicit the pain."    Interval history: Worsening R hand pain and swelling.    Review of Systems   Constitutional: Negative for fever.   Cardiovascular: Positive for leg swelling. Negative for chest pain.   Musculoskeletal: Positive for joint pain.   Neurological: Positive for weakness.       Physical Exam  Physical Exam   Constitutional: No distress.   Eyes: Conjunctivae and lids are normal.   Neck: JVD present.   Cardiovascular: S1 normal and S2 normal. An irregular rhythm present.   Pulmonary/Chest: Effort normal and breath sounds normal.   Abdominal: Soft. Normal appearance and bowel sounds are normal.   Musculoskeletal: He exhibits edema.        Right shoulder: He exhibits decreased range of motion, tenderness and pain.        Right hand: He exhibits decreased range of motion, tenderness and swelling.   Neurological: He is alert. He is not disoriented.       Temp:  [96 " °F (35.6 °C)-98.7 °F (37.1 °C)]   Pulse:  []   Resp:  [16-18]   BP: (101-141)/(57-94)   SpO2:  [91 %-97 %]     Intake/Output Summary (Last 24 hours) at 7/15/2019 1600  Last data filed at 7/15/2019 0558  Gross per 24 hour   Intake 390 ml   Output 1975 ml   Net -1585 ml       Recent Labs   Lab 07/13/19 0524 07/14/19  0503 07/15/19  0459   WBC 7.14 7.37 8.52   HGB 7.2* 7.0* 6.7*   HCT 24.4* 22.9* 22.7*    322 327     Recent Labs   Lab 07/13/19 0524 07/14/19  0503 07/15/19  0459    136 141   K 3.2* 3.5 3.8   CL 88* 88* 92*   CO2 31* 31* 29   BUN 79* 84* 84*   CREATININE 2.2* 2.0* 1.8*   * 136* 109   CALCIUM 10.0 10.2 10.6*   MG 2.3 2.3 2.3   PHOS 3.2 3.1 3.4     Recent Labs   Lab 07/13/19 0524 07/14/19  0503 07/15/19  0459   ALKPHOS 205* 199* 198*   ALT 9* 10 11   AST 20 24 23   ALBUMIN 3.4* 3.3* 3.2*   PROT 9.2* 8.9* 9.0*   BILITOT 0.7 0.7 0.7   INR 2.2* 2.2* 2.0*      No results for input(s): CPK, CPKMB, MB, TROPONINI, BNP in the last 72 hours.  Recent Labs   Lab 07/02/19  2215   HGBA1C 6.0*       Scheduled Meds:   aspirin  81 mg Oral Daily    ergocalciferol  50,000 Units Oral Q7 Days    ferrous sulfate  325 mg Oral Daily    furosemide  80 mg Oral BID    gabapentin  100 mg Oral BID    isosorbide-hydrALAZINE 20-37.5 mg  1 tablet Oral BID    methocarbamol  500 mg Oral BID PC    methyl salicylate-menthol 15-10%   Topical (Top) TID    metoprolol tartrate  25 mg Oral Q6H    pantoprazole  40 mg Oral Daily    polyethylene glycol  17 g Oral BID    senna-docusate 8.6-50 mg  1 tablet Oral BID    warfarin  7.5 mg Oral Daily     Continuous Infusions:    As Needed:  albuterol, calcium carbonate, diphenhydrAMINE, HYDROmorphone, nitroGLYCERIN, oxymetazoline, ramelteon, sodium chloride 0.9%, sodium chloride 0.9%    Active Hospital Problems    Diagnosis  POA    *Acute on chronic combined systolic and diastolic heart failure [I50.43]  Yes    Atrial fibrillation, chronic [I48.2]  Yes      Priority: Low     Chronic    Essential hypertension [I10]  Yes     Priority: Low     Chronic    Dysphagia for pills, idiopathic [R13.10]  Yes     Patient chews pills and opens capsules -- avoid sustained-release formulations.      Shoulder pain, right [M25.511]  Yes    Weakness of lower extremity [R29.898]  Yes    Acute pain of right shoulder due to trauma [M25.511, G89.11]  Yes    Neuropathy of both feet [G57.93]  Yes    Prediabetes [R73.03]  Yes    Venous stasis of lower extremity [I87.8]  Yes    Impaired mobility [Z74.09]  Yes    Elevated alkaline phosphatase level [R74.8]  Yes    JEAN-PIERRE (acute kidney injury) [N17.9]  Yes    Chronic kidney disease [N18.9]  Yes     Chronic    Lumbar back pain [M54.5]  Yes    Personal history of venous thrombosis and embolism [Z86.718]  Not Applicable    Chronic anticoagulation [Z79.01]  Not Applicable     Chronic    Non compliance w medication regimen [Z91.14]  Not Applicable     Chronic    History of CVA (cerebrovascular accident) [Z86.73]  Not Applicable     Personal account, occurred in 2008/2009 at [facility].        Resolved Hospital Problems    Diagnosis Date Resolved POA    Febrile illness, acute [R50.9] 07/09/2019 Yes       Overview of Hospital Course:  52 yo M with combined CHF (Echo 4/2019 with EF 23%, diastolic dysfuction, moderated MR and TR), HTN, chronic A-fib (on Coumadin), A-flutter s/p ablation, CVA (in 2009), CAD, ?PE (patient reported), non-compliance presenting with musculoskeletal complaints and weakness admitted for acute on chronic combined heart failure and JEAN-PIERRE.    Assessment and Plan:    Acute on Chronic Combined systolic/diastolic heart failure   -Cardiology consulted, appreciate assistance  -continued Lasix infusion until 7/8  -Repeat ECHO ordered; cancelled by Cardiology  -Converted diuresis to home oral dose 7/9 as per Cardiology's recommendations.  -sCr increased; continued more aggressive diuresis.  -less diuresis noted with  change from IV to oral Lasix; sCr improved and stable.   -Continuing home Lasix regimen and fluid restriction.      Right sided weakness & acute pain   -IV opiates discontinued, on oral morphine PRN, changed to liquid due to history of chewing pills.  -Gabapentin renal-dose ordered, initially refusing but now taking, titrate dose as tolerated.    -etiology for pian and weakness remains unclear, s/p MRI Brain/C/T/L spine  -Patient has chronic elevation of Uric Acid, prior synovial fluid studies negative for gout/pseudogout crystals > trial of empiric Colchicine 0.6mg X 1 did not improve symptoms.   -s/p Right shoulder arthrocentesis - gram stain negative, culture Aerobic with No Growth  -Neurology consulted: concern for non-pathologic pattern/etiology of pain and weakness    -Continue OT/PT; plan for SNF  -complaints of migratory muscle pain persists; continue OT/PT  -there has been intermittent swelling of the patient's right hand for the past few days as of 7/14 it is more swollen and painful with range of motion and has tenderness to palpation.  Obtaining plain films. ESR and CRP elevated; consulted Ortho. Joint aspiration performed with minimal fluid obtained.     Febrile Illness - resolved  -febrile a few hours after admission on 7/3 - 7/4 . Afebrile since.   -Procal elevated, blood cultures negative  -initial UA w/o signs of infection  -CXR w/o signs of new infiltrate  -Empiric Vanc & Zosyn discontinued  -synovial fluid cultures from R shoulder show no growth so far     Atrial Fibrillation, PAF   -Beta-blocker changed to Lopressor 25mg q6hrs.   -Therapeutic Lovenox with INR low  -warfarin at 10mg dosing, PharmD consulted..   -Avoiding sustained-release BB (Toprol) due to patient's habit of chewing pills.  -episodes of V-tach and PVCs; EKG showed A-fib with PACs.     JEAN-PIERRE on CKD   -bladder scans with < 100 mL  -Suspect cardiorenal syndrome. Initial Urine lytes both FeNa 0.4% and FeUrea 14% represent Pre-renal  etiology in this case.   -Continued diuresis per Cardiology's recommendations until appeared intravascularly dry.  -sCr and BUN increased further; consulted Nephrology. Continuing diuresis, work up in progress.  -sCr improving; Continuing current management as per Nephrology's recommendations. Work up for paraproteinemia negative.     Hx of VTE  -Coumadin 10 mg   -subtherapeutic INR  -continued Therapeutic Lovenox until INR increased; now discontinued.  -PharmD consulted for Coumadin management.  -mild epistaxis occurred. Patient refused dose.     Anemia of iron deficiency  -normal ferritin but low serum iron and saturation   -Pt declines blood product transfusion, refusal signed  -repeat iron studies/retic - retic count low, serum and saturated iron very low. Oral iron supplementation started.  -No evidence of GI bleeding.  Had episodes of epistaxis 7/14.  -H&H stable.  Continue iron supplementation. FOBT ordered.      High Risk Conditions  Patient is currently on drug therapy requiring intensive monitoring for toxicity: Warfarin     Diet:  Diet NPO Except for: Medication, Sips with Medication  GI Prophylaxis: Continued, chronic acid suppression therapy as OP  DVT Prophylaxis:     Anticoagulants   Medication Route Frequency    warfarin (COUMADIN) tablet 7.5 mg Oral Daily     Lines/ Drains/ Airways:  PIV  Urinary Catheter Indicated: Patient Does Not Have Urinary Catheter  Other Lines/Tubes/Drains:  Wounds:  Venous stasis ulcers    Goals of Care: Routine interventions; curative  Discharge plan:  Skilled Nursing Facility    Oly Welsh MD  Department of Hospital Medicine  [id number]

## 2019-07-15 NOTE — PROGRESS NOTES
[facility]  Physical Medicine & Rehab  Progress Note    Patient Name: Hamlet Terrell  MRN: [id number]  Admission Date: 7/2/2019  Length of Stay: 11 days  Attending Physician: Oly Welsh MD    Subjective:     Principal Problem: Acute on chronic combined systolic and diastolic heart failure    Hospital Course:   7/8/19: Participated w/ PT & OT. Sit tos tand modA w/ RW. Ambulated 3 ft CGA- TotalA w/ RW. Grooming CGA. LBD totalA.   7/9/19: Participated w/ PT. Sit to stand modA w/ RW.   07/11/2019: Sit to stand Min x 2 ppl -CGA.  Ambulated 6 ft x 2 trials MaxA.  UBD and LBD Id[place].  7/12/19: Patient refused OT & PT 2/2 R hand edema.     Interval History 7/15/2019:  Patient is seen for follow-up rehab evaluation and recommendations: Not fully participating with therapy. Pain to R shoulder not controlled.     HPI, Past Medical, Family, and Social History remains the same as documented in the initial encounter.    Scheduled Medications:    aspirin  81 mg Oral Daily    ergocalciferol  50,000 Units Oral Q7 Days    ferrous sulfate  325 mg Oral Daily    furosemide  80 mg Oral BID    gabapentin  100 mg Oral BID    isosorbide-hydrALAZINE 20-37.5 mg  1 tablet Oral BID    methocarbamol  500 mg Oral BID PC    methyl salicylate-menthol 15-10%   Topical (Top) TID    metoprolol tartrate  25 mg Oral Q6H    pantoprazole  40 mg Oral Daily    polyethylene glycol  17 g Oral BID    senna-docusate 8.6-50 mg  1 tablet Oral BID    warfarin  7.5 mg Oral Daily       Diagnostic Results:   Labs: Reviewed    PRN Medications: albuterol, calcium carbonate, diphenhydrAMINE, HYDROmorphone, nitroGLYCERIN, oxymetazoline, ramelteon, sodium chloride 0.9%, sodium chloride 0.9%    Review of Systems   Constitutional: Positive for activity change. Negative for fatigue and fever.        R shoulder pain and R leg numbness and decreased mobility    HENT: Negative for trouble swallowing and voice change.    Respiratory: Negative  for cough and shortness of breath.    Cardiovascular: Negative for chest pain and leg swelling.   Gastrointestinal: Negative for abdominal distention and abdominal pain.   Genitourinary: Negative for difficulty urinating and flank pain.   Musculoskeletal: Positive for gait problem. Negative for back pain.   Skin: Negative for color change and rash.   Neurological: Positive for weakness. Negative for speech difficulty and numbness.   Psychiatric/Behavioral: Negative for agitation and confusion.     Objective:     Vital Signs (Most Recent):  Temp: 96 °F (35.6 °C) (07/15/19 0903)  Pulse: 89 (07/15/19 1000)  Resp: 16 (07/15/19 0903)  BP: 129/66 (07/15/19 0903)  SpO2: 96 % (07/15/19 0903)    Vital Signs (24h Range):  Temp:  [96 °F (35.6 °C)-97.9 °F (36.6 °C)] 96 °F (35.6 °C)  Pulse:  [66-97] 89  Resp:  [16-18] 16  SpO2:  [91 %-98 %] 96 %  BP: (101-129)/(57-73) 129/66     Physical Exam   Constitutional: He is oriented to person, place, and time. He appears well-developed and well-nourished.   HENT:   Head: Normocephalic and atraumatic.   Eyes: Pupils are equal, round, and reactive to light. EOM are normal.   Neck: Neck supple.   Pulmonary/Chest: Effort normal. No respiratory distress.   Musculoskeletal: He exhibits edema (BLE and R hand).   - RUE and RLE painful upon movement, decreased muscle strength    Neurological: He is alert and oriented to person, place, and time. A sensory deficit (RLE) is present.   Skin: Skin is warm and dry.   Psychiatric: He has a normal mood and affect. His behavior is normal. Thought content normal.   Nursing note and vitals reviewed.    Assessment/Plan:      Acute pain of right shoulder due to trauma  - s/p Right shoulder arthrocentesis - gram stain negative, culture Aerobic with No Growth.    Impaired mobility  - Related to prolonged/acute hospital course.     Recommendations  -  Encourage mobility, OOB in chair at least 3 hours per day, and early ambulation as appropriate  -  PT/OT  evaluate and treat  -  Pain management  -  Monitor for and prevent skin breakdown and pressure ulcers  · Early mobility, repositioning/weight shifting every 20-30 minutes when sitting, turn patient every 2 hours, proper mattress/overlay and chair cushioning, pressure relief/heel protector boots  -  DVT prophylaxis    -  Reviewed discharge options (IP rehab, SNF, HH therapy, and OP therapy)    JEAN-PIERRE (acute kidney injury)  - nephrology was consulted and recommending increase Lasix and avoid nephrotoxic meds    Personal history of venous thrombosis and embolism  - Coumadin    Will follow progress and discuss with rehab team for post acute care/rehab recommendation.     Mary Buitrago NP  Department of Physical Medicine & Rehab   [facility]

## 2019-07-15 NOTE — PLAN OF CARE
Miguel did reach out to Hugo with [facility] at [phone], Edna is following Pt with [facility], Edna will reach out to  with referral and acceptance.

## 2019-07-15 NOTE — PLAN OF CARE
Problem: Occupational Therapy Goal  Goal: Occupational Therapy Goal  Goals to be met by: 7/22 (revised per POC 7/15)  Patient will increase functional independence with ADLs by performing:    LB dressing (brief, shorts) with set up and minimal assistance.  Grooming while standing at sink with set up and Minimal Assistance.  Toilet transfer with Minimal Assistance.  Functional mobility at household distance for ADL task with CGA.  B UE HEP for endurance mgmt with hand out and assistance as needed.     Outcome: Ongoing (interventions implemented as appropriate)  Goals revised. Cont to rec SS SNF at this time. POC 3x/w. Pt with increased mobility on this date. DHRUV Blanton 7/15/2019

## 2019-07-15 NOTE — PROGRESS NOTES
[facility]  Orthopedics  Progress Note    Patient Name: Hamlet Terrell  MRN: [id number]  Admission Date: 7/2/2019  Hospital Length of Stay: 11 days  Attending Provider: Oly Welsh MD  Primary Care Provider: Carlin Swift MD    Subjective:     Principal Problem:Acute on chronic combined systolic and diastolic heart failure    Principal Orthopedic Problem: R RF PIP Pain    Interval History: Pt seen and examined at bedside. NAEO. Contacted by hospital medicine team that patient Tricia reports 7 day history of R ring finger pain. He reports it is very painful to move. Reports tenderness to palpation. He denies fever or chills. He denies recent trauma or IVDU. Orthopedics was recently consulted to evaluate R shoulder pain which has since improved significantly but still is painful.     Review of patient's allergies indicates:   Allergen Reactions    Acetaminophen      Itching    Oxycodone-acetaminophen      Other reaction(s): Itching    Ace inhibitors Other (See Comments)     cough       Current Facility-Administered Medications   Medication    albuterol inhaler 1 puff    aspirin EC tablet 81 mg    calcium carbonate 200 mg calcium (500 mg) chewable tablet 500 mg    diphenhydrAMINE capsule 25 mg    ergocalciferol capsule 50,000 Units    ferrous sulfate EC tablet 325 mg    furosemide tablet 80 mg    gabapentin 250 mg/5 mL (5 mL) solution 100 mg    HYDROmorphone 1 mg/mL oral liquid 1 mg    isosorbide-hydrALAZINE 20-37.5 mg per tablet 1 tablet    methocarbamol tablet 500 mg    methyl salicylate-menthol 15-10% cream    metoprolol tartrate (LOPRESSOR) tablet 25 mg    nitroGLYCERIN SL tablet 0.4 mg    oxymetazoline 0.05 % nasal spray 2 spray    pantoprazole EC tablet 40 mg    polyethylene glycol packet 17 g    ramelteon tablet 8 mg    senna-docusate 8.6-50 mg per tablet 1 tablet    sodium chloride 0.9% flush 10 mL    sodium chloride 0.9% flush 10 mL    warfarin (COUMADIN) tablet  "7.5 mg     Objective:     Vital Signs (Most Recent):  Temp: 98 °F (36.7 °C) (07/15/19 1600)  Pulse: (!) 119 (07/15/19 1600)  Resp: 16 (07/15/19 0903)  BP: (!) 141/94 (07/15/19 1600)  SpO2: 96 % (07/15/19 1600) Vital Signs (24h Range):  Temp:  [96 °F (35.6 °C)-98.7 °F (37.1 °C)] 98 °F (36.7 °C)  Pulse:  [] 119  Resp:  [16-18] 16  SpO2:  [91 %-97 %] 96 %  BP: (101-141)/(57-94) 141/94     Weight: 113.9 kg (251 lb 1.7 oz)  Height: 5' 9" (175.3 cm)  Body mass index is 37.08 kg/m².      Intake/Output Summary (Last 24 hours) at 7/15/2019 1618  Last data filed at 7/15/2019 0558  Gross per 24 hour   Intake 390 ml   Output 1975 ml   Net -1585 ml       Ortho/SPM Exam    R RF with moderate swelling over PIP joint  Mild swelling of dorsal hand with slight erythema.   TTP over RF dorsally and over 4th metacarpal  SILT M/U/R  Motor intact AIN/PIN/M/U/R   Cap refill < 2s  2+ RP    Significant Labs:   CBC:   Recent Labs   Lab 07/14/19  0503 07/15/19  0459   WBC 7.37 8.52   HGB 7.0* 6.7*   HCT 22.9* 22.7*    327     CRP:   Recent Labs   Lab 07/15/19  0459   CRP 23.3*     All pertinent labs within the past 24 hours have been reviewed.    Significant Imaging: I have reviewed and interpreted all pertinent imaging results/findings.   XR R hand shows no fx or dislocation    Assessment/Plan:     Injury of right ring finger  Pt is a 54 yo M being followed by orthopedic spine service for R shoulder pain and lower extremity weakness found to have no cause for pain on shoulder MRI or weakness after C/T/L spine MRI who now complains of R hand pain.     Contacted for this patient that orthopedic spine is already following regarding reported R hand pain and swelling. Pt does have swelling and pain over R RF DIP. He also has mild R hand swelling dorsally. No palpable fluctuance. No evidence of lesion or skin breakdown. Aspirated R RF PIP joint under c arm to send for cultures. Patient NPO pending results of " aspiration.        Procedure Note:  After consent was obtained, using sterile technique and c-arm assistance the right ring finger PIP joint was entered and 0.2 cc of yellow joint fuild colored fluid was withdrawn and sent for culture and gram stain. Bandaid was applied. The procedure was well tolerated.      Shoulder pain, right  Pt is a 54 yo M with severe CHF who presents with acute onset of RUE pain, total RLE weakness, and R foot numbness. MRI of CTL spine do not indicate source of neuro deficits. No cord or foraminal compression seen. Upper extremity pain is diffuse and not limited to the shoulder joint. MRI obtained of the R shoulder shows no evidence of joint effusion. There is some edema noted between muscular layers, possibly suggestive of a rotator cuff tear. Cultures have NGTD. No concern for septic arthritis of the shoulder. Will continue to follow cultures.           Jonatan Gaines MD  Orthopedics  [facility]

## 2019-07-15 NOTE — PT/OT/SLP PROGRESS
Physical Therapy Treatment    Patient Name:  Hamlet Terrell   MRN:  [id number]    Recommendations:     Discharge Recommendations:  nursing facility, skilled   Discharge Equipment Recommendations: walker, rolling, commode, shower chair   Barriers to discharge: Decreased caregiver support    Assessment:     Hamlet Terrell is a 53 y.o. male admitted with a medical diagnosis of Acute on chronic combined systolic and diastolic heart failure.  He presents with the following impairments/functional limitations:  weakness, gait instability, impaired endurance, impaired balance, impaired self care skills, impaired functional mobilty, impaired cardiopulmonary response to activity, decreased lower extremity function, decreased upper extremity function, impaired sensation, decreased ROM, decreased safety awareness, pain, impaired coordination, impaired fine motor, edema, decreased coordination, impaired joint extensibility. Pt progressing functional mobility compared to previous sessions. He was able to complete transfer sit>stand without physical assist. Ambulated increased distance this date but gradually required increasing assistance due to low back pain and difficulty advancing RLE with pt ultimately requiring return to room via chair. Pt would continue to benefit from skilled acute PT in order to address current deficits and progress functional mobility.     Rehab Prognosis: Good; patient would benefit from acute skilled PT services to address these deficits and reach maximum level of function.    Recent Surgery: * No surgery found *      Plan:     During this hospitalization, patient to be seen 3 x/week to address the identified rehab impairments via gait training, therapeutic activities, therapeutic exercises, neuromuscular re-education and progress toward the following goals:    · Plan of Care Expires:  08/03/19    Subjective     Chief Complaint: R hand swelling and pain; R foot numbness; back pain during gait  "  Patient/Family Comments/goals: "I'll do more tomorrow."   Pain/Comfort:  · Pain Rating 1: (reported pain in R 4th digit; did not rate)  · Pain Addressed 1: Reposition, Distraction      Objective:     Communicated with RN prior to session.  Patient found up in chair with telemetry upon PT entry to room.     General Precautions: Standard, fall   Orthopedic Precautions:N/A   Braces: N/A     Functional Mobility:  · Transfers:     · Sit to Stand:  stand by assistance with no AD from bedside chair   · Gait: 17 ft. with HHA, initially with Becky but progressed to Becky with assist of 2 (assist of 1 for balance and assist of 2 for managing RLE)   · demo'd decreased step length (R>L), decreased jordan, increased time in double stance, no floor clearance RLE (sliding to advance), excess ER RLE with increased use of adductors to advance, increased L lean  · Initially performing with HHA and Becky of 1; required Becky of 2nd person towards end of gait for management of RLE in order to advance  · Increasing back pain as gait distance progressed, requiring chair to return to room      AM-PAC 6 CLICK MOBILITY  Turning over in bed (including adjusting bedclothes, sheets and blankets)?: 3  Sitting down on and standing up from a chair with arms (e.g., wheelchair, bedside commode, etc.): 3  Moving from lying on back to sitting on the side of the bed?: 3  Moving to and from a bed to a chair (including a wheelchair)?: 3  Need to walk in hospital room?: 3  Climbing 3-5 steps with a railing?: 1  Basic Mobility Total Score: 16       Therapeutic Activities and Exercises:   Education provided on the importance of OOB activity and participation in therapy. Continues to require encouragement and reinforcement.   Attempted to educate on delirium precautions with pt encouraged to have lights on and blinds open; however, pt declining  Educated on elevating RUE and performing hand pumps. Pt will likely require continued reinforcement  Pt oriented " to call bell and instructed to have staff assist for standing tasks/transfers. Pt v/u.     Patient left up in chair with all lines intact and call button in reach..    GOALS:   Multidisciplinary Problems     Physical Therapy Goals        Problem: Physical Therapy Goal    Goal Priority Disciplines Outcome Goal Variances Interventions   Physical Therapy Goal     PT, PT/OT Ongoing (interventions implemented as appropriate)     Description:  Goals to be met by: 2019     Patient will increase functional independence with mobility by performin. Supine to sit with Set-up Winston -not met  2. Sit to supine with Set-up Winston -not met  3. Sit to stand transfer with Stand-by Assistance -met 7/15  3a. Sit to stand transfer with Mod-I   4. Bed to chair transfer with Stand-by Assistance using Rolling Walker -not met  5. Gait  x 30 feet with Contact Guard Assistance using Rolling Walker. -not met  6. Lower extremity exercise program x20 reps per handout, with assistance as needed -not met                        Time Tracking:     PT Received On: 07/15/19  PT Start Time: 914     PT Stop Time: 944  PT Total Time (min): 30 min      Billable Minutes: Gait Training 23   (co-tx with OT)    Treatment Type: Treatment  PT/PTA: PT     PTA Visit Number: 0     Eboni Pittman, PT, DPT   7/15/2019  [phone]

## 2019-07-15 NOTE — PT/OT/SLP PROGRESS
Occupational Therapy   Treatment/Goal Update    Name: Hamlet Terrell  MRN: [id number]  Admitting Diagnosis:  Acute on chronic combined systolic and diastolic heart failure       Recommendations:     Discharge Recommendations: nursing facility, skilled  Discharge Equipment Recommendations:  commode, shower chair, walker, rolling  Barriers to discharge:  Decreased caregiver support    Assessment:     Hamlet Terrell is a 53 y.o. male with a medical diagnosis of Acute on chronic combined systolic and diastolic heart failure.  He presents with overall decline in his functional endurance for functional tasks/activities. Pt did comply and complete increased mobility on this date. Pain remains his greatest limitation at this time (ie: lower back and R hand). Performance deficits affecting function are weakness, impaired endurance, impaired functional mobilty, impaired self care skills, gait instability, impaired balance, decreased upper extremity function, decreased lower extremity function, pain, impaired cardiopulmonary response to activity.     Rehab Prognosis:  Good; patient would benefit from acute skilled OT services to address these deficits and reach maximum level of function.       Plan:     Patient to be seen 3 x/week to address the above listed problems via self-care/home management, therapeutic activities, therapeutic exercises  · Plan of Care Expires: 08/07/19  · Plan of Care Reviewed with: patient    Subjective     Pain/Comfort:  · Pain Rating 1: (c/o pain in 4th digit R hand ; numbness to R foot)  · Pain Addressed 1: Reposition  · Pain Rating Post-Intervention 1: (remains)    Objective:     Communicated with: RN prior to session. Completed with PT.  Patient found up in chair with telemetry, peripheral IV upon OT entry to room.    General Precautions: Standard, fall   Orthopedic Precautions:N/A   Braces: N/A     Occupational Performance:   Functional Mobility/Transfers:  · Patient completed Sit <> Stand  Transfer with contact guard assistance  with  no assistive device   · Functional Mobility: household distance with min(A) and L UE support/hand held assistance  · Pivot/turn with mod(A) 2/2 requiring assistance for R foot placement and pivot  · Cessation of activity 2/2 lower back pain increase    Activities of Daily Living:  · Feeding:  modified independence with set up for medication admin upon arrival   · Pt declined UB dressing and grooming in bathroom     [facility] 6 Click ADL: 17    Treatment & Education:  -Pt alert and oriented x4; agreeable to therapy session  -re edu on OT/therapy role in care  -discussed importance of increased activity/mobility for healing process and pt agreeable to complete on this date  -Communication board updated; questions/concerns addressed within OT scope of practice  -no family at bedside for education     Patient left up in chair with all lines intact and call button in reachEducation:      GOALS:   Multidisciplinary Problems     Occupational Therapy Goals        Problem: Occupational Therapy Goal    Goal Priority Disciplines Outcome Interventions   Occupational Therapy Goal     OT, PT/OT Ongoing (interventions implemented as appropriate)    Description:  Goals to be met by: 7/22 (revised per POC 7/15)  Patient will increase functional independence with ADLs by performing:    LB dressing (brief, shorts) with set up and minimal assistance.  Grooming while standing at sink with set up and Minimal Assistance.  Toilet transfer with Minimal Assistance.  Functional mobility at household distance for ADL task with CGA.  B UE HEP for endurance mgmt with hand out and assistance as needed.                       Time Tracking:     OT Date of Treatment: 07/15/19  OT Start Time: 0914  OT Stop Time: 0944  OT Total Time (min): 30 min    Billable Minutes:Therapeutic Activity 25    DHRUV Blanton  7/15/2019

## 2019-07-15 NOTE — ASSESSMENT & PLAN NOTE
BL sCr 1.3 last on 7/2 day of admit and peaked at 2.9   Was on lasix pushes, then started on gtt until 7/9 with good UOP - net negative 5.5L total  Previously JEAN-PIERRE believed 2/2 cardiorenal syndrome as patient had worsening renal function in setting of heart failure exacerbation, but renal function continued to decline despite diuresis  Patient has been getting primarily morphine for pain relief throughout admission, started on gabapentin, and has been on therapeutic dose lovenox as a bridge for warfarin as he was found to have subtherapeutic INR  BP has been a bit labile with hypotensive episodes, but currently stable    JEAN-PIERRE likely to be multifactorial - Congestion from HF, multiple potentially nephrotoxic meds, hypotensive episodes. SPEP previously done by primary team showed mild elevation at 8.6. US RP positive for medical renal disease. UPEP, KIANNA, serum free light chains done: shows polyclonal activity, no paraprotein.     Plan:  - Renal function improving with good UOP, patient tolerating Lasix 80mg PO BID - watch for overdiuresis  - Would recommend checking Hepatitis C (as this was last checked over 10 years ago) and if +, may affect renal function  - Also given persistent and worsening anemia with increased BUN, would consider ruling GI bleed. Patient denies blood in stool or urine  - Renally dose meds - maximum 300mg dose of gabapentin  - Liquid dilaudid for pain control  - Recommend holding antihypertensives if SBP <120 to avoid further hypotensive injury  - Strict Is and Os and daily weights  - Avoid further nephrotoxic meds  - Patient will need outpatient Nephrology follow up

## 2019-07-15 NOTE — PLAN OF CARE
Problem: Adult Inpatient Plan of Care  Goal: Plan of Care Review  Outcome: Ongoing (interventions implemented as appropriate)  Pt free of falls/trauma/injuries.  Denies c/o SOB, CP, or discomfort.  Generalized skin remains CDI; moderated generalized  edema noted.  Pt conitnues being diuresed with lasix 80mg po ; diuresing well.Pt continues to c/o pain to left shoulder and is receiving   hydromorphine 1mg/ml for pain to help with the pain. Pt continues to have swelling to right hand  Instructed to elevate to help with swelling.Neurology consulted and is following pt.  Pt tolerating plan of care

## 2019-07-15 NOTE — SUBJECTIVE & OBJECTIVE
Interval History 7/15/2019:  Patient is seen for follow-up rehab evaluation and recommendations: Not fully participating with therapy. Pain to R shoulder not controlled.     HPI, Past Medical, Family, and Social History remains the same as documented in the initial encounter.    Scheduled Medications:    aspirin  81 mg Oral Daily    ergocalciferol  50,000 Units Oral Q7 Days    ferrous sulfate  325 mg Oral Daily    furosemide  80 mg Oral BID    gabapentin  100 mg Oral BID    isosorbide-hydrALAZINE 20-37.5 mg  1 tablet Oral BID    methocarbamol  500 mg Oral BID PC    methyl salicylate-menthol 15-10%   Topical (Top) TID    metoprolol tartrate  25 mg Oral Q6H    pantoprazole  40 mg Oral Daily    polyethylene glycol  17 g Oral BID    senna-docusate 8.6-50 mg  1 tablet Oral BID    warfarin  7.5 mg Oral Daily       Diagnostic Results:   Labs: Reviewed    PRN Medications: albuterol, calcium carbonate, diphenhydrAMINE, HYDROmorphone, nitroGLYCERIN, oxymetazoline, ramelteon, sodium chloride 0.9%, sodium chloride 0.9%    Review of Systems   Constitutional: Positive for activity change. Negative for fatigue and fever.        R shoulder pain and R leg numbness and decreased mobility    HENT: Negative for trouble swallowing and voice change.    Respiratory: Negative for cough and shortness of breath.    Cardiovascular: Negative for chest pain and leg swelling.   Gastrointestinal: Negative for abdominal distention and abdominal pain.   Genitourinary: Negative for difficulty urinating and flank pain.   Musculoskeletal: Positive for gait problem. Negative for back pain.   Skin: Negative for color change and rash.   Neurological: Positive for weakness. Negative for speech difficulty and numbness.   Psychiatric/Behavioral: Negative for agitation and confusion.     Objective:     Vital Signs (Most Recent):  Temp: 96 °F (35.6 °C) (07/15/19 0903)  Pulse: 89 (07/15/19 1000)  Resp: 16 (07/15/19 0903)  BP: 129/66 (07/15/19  0903)  SpO2: 96 % (07/15/19 0903)    Vital Signs (24h Range):  Temp:  [96 °F (35.6 °C)-97.9 °F (36.6 °C)] 96 °F (35.6 °C)  Pulse:  [66-97] 89  Resp:  [16-18] 16  SpO2:  [91 %-98 %] 96 %  BP: (101-129)/(57-73) 129/66     Physical Exam   Constitutional: He is oriented to person, place, and time. He appears well-developed and well-nourished.   HENT:   Head: Normocephalic and atraumatic.   Eyes: Pupils are equal, round, and reactive to light. EOM are normal.   Neck: Neck supple.   Pulmonary/Chest: Effort normal. No respiratory distress.   Musculoskeletal: He exhibits edema (BLE and R hand).   - RUE and RLE painful upon movement, decreased muscle strength    Neurological: He is alert and oriented to person, place, and time. A sensory deficit (RLE) is present.   Skin: Skin is warm and dry.   Psychiatric: He has a normal mood and affect. His behavior is normal. Thought content normal.   Nursing note and vitals reviewed.    NEUROLOGICAL EXAMINATION:     MENTAL STATUS   Oriented to person, place, and time.     CRANIAL NERVES     CN III, IV, VI   Pupils are equal, round, and reactive to light.  Extraocular motions are normal.

## 2019-07-15 NOTE — CARE UPDATE
Contacted for this patient that orthopedic spine is already following regarding reported R hand pain and swelling. Pt does have swelling and pain over R RF DIP. He also has mild R hand swelling dorsally. No palpable fluctuance. No evidence of lesion or skin breakdown. Aspirated R RF PIP joint under c arm to send for cultures. Patient NPO pending results of aspiration.      Procedure Note:  After consent was obtained, using sterile technique and c-arm assistance the right ring finger PIP joint was entered and 0.2 cc of yellow joint fuild colored fluid was withdrawn and sent for culture and gram stain. Bandaid was applied. The procedure was well tolerated.

## 2019-07-15 NOTE — NURSING
Patient had two episodes of asymptomatic V-Tach first episode was 16 beats and second episode was 13 beats. Patient deines palpations and SOB. Patient refused labs to be drawn to check electrolytes. AYESHA Regan notified. Will cont to monitor patient.

## 2019-07-15 NOTE — PROGRESS NOTES
[facility]  Nephrology  Progress Note    Patient Name: Hamlet Terrell  MRN: [id number]  Admission Date: 7/2/2019  Hospital Length of Stay: 11 days  Attending Provider: Oly Welsh MD   Primary Care Physician: Carlin Swift MD  Principal Problem:Acute on chronic combined systolic and diastolic heart failure    Subjective:     HPI: Mr. Terrell is a 53M with HFrEF (4/2019 EF 23%, DD, PAP 47, CVP 15), HTN, AFib on coumadin, AFlutter s/p ablation, CVA 2009, CAD who presented on 7/2 with R shoulder/arm pain and ultimately admitted for acute on chronic combined heart failure. Patient reports that he is unable to lay flat at home and normally sleeps on 2 pillows. He is able to ambulate around his home without getting SOB, but does not leave home often. He does not use any walking aids like a walker or cane. He does have LE edema but has not personally noticed any worsening in edema from baseline. Denied excessive salt and fluid intake in daily diet.    On admit, he was found to have a BNP of 554 and CXR positive for congestion. He was given multiple doses of lasix and subsequently placed on lasix gtt until 7/9. He had had good UOP of 1-2.5L per day. Initially, JEAN-PIERRE was attributed to cardiorenal syndrome, but nephrology consulted on 7/10 due to worsening renal function.    Interval History: Patient's renal function continues to improve. Cr 1.8 today from 2.0 yesterday. UOP 2.7L over the past day.    Review of patient's allergies indicates:   Allergen Reactions    Acetaminophen      Itching    Oxycodone-acetaminophen      Other reaction(s): Itching    Ace inhibitors Other (See Comments)     cough     Current Facility-Administered Medications   Medication Frequency    albuterol inhaler 1 puff Q6H PRN    aspirin EC tablet 81 mg Daily    calcium carbonate 200 mg calcium (500 mg) chewable tablet 500 mg TID PRN    diphenhydrAMINE capsule 25 mg Q6H PRN    ergocalciferol capsule 50,000 Units Q7 Days     ferrous sulfate EC tablet 325 mg Daily    furosemide tablet 80 mg BID    gabapentin 250 mg/5 mL (5 mL) solution 100 mg BID    HYDROmorphone 1 mg/mL oral liquid 1 mg Q4H PRN    isosorbide-hydrALAZINE 20-37.5 mg per tablet 1 tablet BID    lidocaine HCL 10 mg/ml (1%) injection 20 mL Once    methocarbamol tablet 500 mg BID PC    methyl salicylate-menthol 15-10% cream TID    metoprolol tartrate (LOPRESSOR) tablet 25 mg Q6H    nitroGLYCERIN SL tablet 0.4 mg Q5 Min PRN    oxymetazoline 0.05 % nasal spray 2 spray BID PRN    pantoprazole EC tablet 40 mg Daily    polyethylene glycol packet 17 g BID    ramelteon tablet 8 mg Nightly PRN    senna-docusate 8.6-50 mg per tablet 1 tablet BID    sodium chloride 0.9% flush 10 mL PRN    sodium chloride 0.9% flush 10 mL PRN    warfarin (COUMADIN) tablet 7.5 mg Daily       Objective:     Vital Signs (Most Recent):  Temp: 98.7 °F (37.1 °C) (07/15/19 1151)  Pulse: 92 (07/15/19 1200)  Resp: 16 (07/15/19 0903)  BP: 133/78 (07/15/19 1151)  SpO2: (!) 94 % (07/15/19 1151)  O2 Device (Oxygen Therapy): room air (07/14/19 2055) Vital Signs (24h Range):  Temp:  [96 °F (35.6 °C)-98.7 °F (37.1 °C)] 98.7 °F (37.1 °C)  Pulse:  [66-98] 92  Resp:  [16-18] 16  SpO2:  [91 %-97 %] 94 %  BP: (101-133)/(57-78) 133/78     Weight: 113.9 kg (251 lb 1.7 oz) (07/13/19 0500)  Body mass index is 37.08 kg/m².  Body surface area is 2.35 meters squared.    I/O last 3 completed shifts:  In: 1130 [P.O.:1130]  Out: 3275 [Urine:3275]    Physical Exam   Constitutional: He is oriented to person, place, and time. He appears well-developed and well-nourished. No distress.   HENT:   Mouth/Throat: No oropharyngeal exudate.   Eyes: Conjunctivae and EOM are normal.   Neck: Normal range of motion. Neck supple.   Cardiovascular: Normal heart sounds and intact distal pulses.   Heart rate irregularly irregular   Pulmonary/Chest: Effort normal. No respiratory distress. He has no wheezes. He has rales (bibasilar).    Abdominal: Soft. Bowel sounds are normal. He exhibits no distension. There is no tenderness. There is no guarding.   Musculoskeletal: He exhibits edema (R hand, pitting BLE up to knees) and tenderness (Over R shoulder).   Neurological: He is alert and oriented to person, place, and time.   Skin: Skin is warm and dry. He is not diaphoretic.   Psychiatric: He has a normal mood and affect. His behavior is normal. Judgment and thought content normal.   Vitals reviewed.       Significant Labs:  CBC:   Recent Labs   Lab 07/15/19  0459   WBC 8.52   RBC 2.81*   HGB 6.7*   HCT 22.7*      MCV 81*   MCH 23.8*   MCHC 29.5*     CMP:   Recent Labs   Lab 07/15/19  0459      CALCIUM 10.6*   ALBUMIN 3.2*   PROT 9.0*      K 3.8   CO2 29   CL 92*   BUN 84*   CREATININE 1.8*   ALKPHOS 198*   ALT 11   AST 23   BILITOT 0.7        Significant Imaging:  Labs: Reviewed    Assessment/Plan:     JEAN-PIERRE (acute kidney injury)  BL sCr 1.3 last on 7/2 day of admit and peaked at 2.9   Was on lasix pushes, then started on gtt until 7/9 with good UOP - net negative 5.5L total  Previously JEAN-PIERRE believed 2/2 cardiorenal syndrome as patient had worsening renal function in setting of heart failure exacerbation, but renal function continued to decline despite diuresis  Patient has been getting primarily morphine for pain relief throughout admission, started on gabapentin, and has been on therapeutic dose lovenox as a bridge for warfarin as he was found to have subtherapeutic INR  BP has been a bit labile with hypotensive episodes, but currently stable    JEAN-PIERRE likely to be multifactorial - Congestion from HF, multiple potentially nephrotoxic meds, hypotensive episodes. SPEP previously done by primary team showed mild elevation at 8.6. US RP positive for medical renal disease. UPEP, KIANNA, serum free light chains done: shows polyclonal activity, no paraprotein.     Plan:  - Renal function improving with good UOP, patient tolerating Lasix 80mg  PO BID - watch for overdiuresis  - Would recommend checking Hepatitis C (as this was last checked over 10 years ago) and if +, may affect renal function  - Also given persistent and worsening anemia with increased BUN, would consider ruling GI bleed. Patient denies blood in stool or urine  - Renally dose meds - maximum 300mg dose of gabapentin  - Liquid dilaudid for pain control  - Recommend holding antihypertensives if SBP <120 to avoid further hypotensive injury  - Strict Is and Os and daily weights  - Avoid further nephrotoxic meds  - Patient will need outpatient Nephrology follow up    Patient seen, and case discussed with staff, Dr. Wesley.     Thank you for your consult. I will sign off. Please contact us if you have any additional questions.    Crystal Fonseca MD PGY2  Nephrology  [facility]

## 2019-07-15 NOTE — PLAN OF CARE
07/15/19 1000   Post-Acute Status   Post-Acute Authorization Placement   Post-Acute Placement Status Referrals Sent

## 2019-07-15 NOTE — PROGRESS NOTES
1) PHARMACY CONSULT NOTE: WARFARIN     Hamlet Terrell is a 53 y.o. male on warfarin therapy for Atrial Fibrillation. PharmD has been consulted for warfarin dosing.     Current order: warfarin 7.5 mg daily    Home dose: 6 mg daily   Coumadin clinic enrollment: Active  INR goal: 2-3     Lab Results   Component Value Date    INR 2.0 (H) 07/15/2019    INR 2.2 (H) 07/14/2019    INR 2.2 (H) 07/13/2019      Diet: Adult Cardiac     Recommendation(s):   · Continue warfarin 7.5 mg daily  · Pharmacy will continue to follow and monitor warfarin    2) PHARMACY CONSULT NOTE: Renal Dose Adjustment     Patient Data:    Vital Signs (Most Recent):  Temp: 97.8 °F (36.6 °C) (07/15/19 0545)  Pulse: 75 (07/15/19 0700)  Resp: 18 (07/15/19 0545)  BP: 115/70 (07/15/19 0545)  SpO2: 96 % (07/15/19 0545) Vital Signs (72h Range):  Temp:  [96.2 °F (35.7 °C)-98.6 °F (37 °C)]   Pulse:  [65-97]   Resp:  [16-18]   BP: ()/(53-95)   SpO2:  [91 %-98 %]      Recent Labs   Lab 07/13/19  0524 07/14/19  0503 07/15/19  0459   CREATININE 2.2* 2.0* 1.8*     Serum creatinine: 1.8 mg/dL (H) 07/15/19 0459  Estimated creatinine clearance: 59.8 mL/min (A)    Medications reviewed, no dose adjustments needed    Thank you for the consult,  Sofi Guerra, PharmD, BCPS, Internal Medicine Clinical Pharmacy Specialist  EXT [id number]     **Note: Consults are reviewed Monday-Friday 7:00am-3:30pm. THE ABOVE RECOMMENDATIONS ARE ONLY SUGGESTED.THE RECOMMENDATIONS SHOULD BE CONSIDERED IN CONJUNCTION WITH ALL PATIENT FACTORS. **

## 2019-07-15 NOTE — SUBJECTIVE & OBJECTIVE
Interval History: Patient's renal function continues to improve. Cr 1.8 today from 2.0 yesterday. UOP 2.7L over the past day.    Review of patient's allergies indicates:   Allergen Reactions    Acetaminophen      Itching    Oxycodone-acetaminophen      Other reaction(s): Itching    Ace inhibitors Other (See Comments)     cough     Current Facility-Administered Medications   Medication Frequency    albuterol inhaler 1 puff Q6H PRN    aspirin EC tablet 81 mg Daily    calcium carbonate 200 mg calcium (500 mg) chewable tablet 500 mg TID PRN    diphenhydrAMINE capsule 25 mg Q6H PRN    ergocalciferol capsule 50,000 Units Q7 Days    ferrous sulfate EC tablet 325 mg Daily    furosemide tablet 80 mg BID    gabapentin 250 mg/5 mL (5 mL) solution 100 mg BID    HYDROmorphone 1 mg/mL oral liquid 1 mg Q4H PRN    isosorbide-hydrALAZINE 20-37.5 mg per tablet 1 tablet BID    lidocaine HCL 10 mg/ml (1%) injection 20 mL Once    methocarbamol tablet 500 mg BID PC    methyl salicylate-menthol 15-10% cream TID    metoprolol tartrate (LOPRESSOR) tablet 25 mg Q6H    nitroGLYCERIN SL tablet 0.4 mg Q5 Min PRN    oxymetazoline 0.05 % nasal spray 2 spray BID PRN    pantoprazole EC tablet 40 mg Daily    polyethylene glycol packet 17 g BID    ramelteon tablet 8 mg Nightly PRN    senna-docusate 8.6-50 mg per tablet 1 tablet BID    sodium chloride 0.9% flush 10 mL PRN    sodium chloride 0.9% flush 10 mL PRN    warfarin (COUMADIN) tablet 7.5 mg Daily       Objective:     Vital Signs (Most Recent):  Temp: 98.7 °F (37.1 °C) (07/15/19 1151)  Pulse: 92 (07/15/19 1200)  Resp: 16 (07/15/19 0903)  BP: 133/78 (07/15/19 1151)  SpO2: (!) 94 % (07/15/19 1151)  O2 Device (Oxygen Therapy): room air (07/14/19 2055) Vital Signs (24h Range):  Temp:  [96 °F (35.6 °C)-98.7 °F (37.1 °C)] 98.7 °F (37.1 °C)  Pulse:  [66-98] 92  Resp:  [16-18] 16  SpO2:  [91 %-97 %] 94 %  BP: (101-133)/(57-78) 133/78     Weight: 113.9 kg (251 lb 1.7 oz)  (07/13/19 0500)  Body mass index is 37.08 kg/m².  Body surface area is 2.35 meters squared.    I/O last 3 completed shifts:  In: 1130 [P.O.:1130]  Out: 3275 [Urine:3275]    Physical Exam   Constitutional: He is oriented to person, place, and time. He appears well-developed and well-nourished. No distress.   HENT:   Mouth/Throat: No oropharyngeal exudate.   Eyes: Conjunctivae and EOM are normal.   Neck: Normal range of motion. Neck supple.   Cardiovascular: Normal heart sounds and intact distal pulses.   Heart rate irregularly irregular   Pulmonary/Chest: Effort normal. No respiratory distress. He has no wheezes. He has rales (bibasilar).   Abdominal: Soft. Bowel sounds are normal. He exhibits no distension. There is no tenderness. There is no guarding.   Musculoskeletal: He exhibits edema (R hand, pitting BLE up to knees) and tenderness (Over R shoulder).   Neurological: He is alert and oriented to person, place, and time.   Skin: Skin is warm and dry. He is not diaphoretic.   Psychiatric: He has a normal mood and affect. His behavior is normal. Judgment and thought content normal.   Vitals reviewed.       Significant Labs:  CBC:   Recent Labs   Lab 07/15/19  0459   WBC 8.52   RBC 2.81*   HGB 6.7*   HCT 22.7*      MCV 81*   MCH 23.8*   MCHC 29.5*     CMP:   Recent Labs   Lab 07/15/19  0459      CALCIUM 10.6*   ALBUMIN 3.2*   PROT 9.0*      K 3.8   CO2 29   CL 92*   BUN 84*   CREATININE 1.8*   ALKPHOS 198*   ALT 11   AST 23   BILITOT 0.7        Significant Imaging:  Labs: Reviewed

## 2019-07-15 NOTE — PLAN OF CARE
Patient remains free from falls and injuries through out shift. Patient AAO and VSS. Patient denies chest pain and SOB. Patient still having c/o right foot and toe numbness. Patient states that he is unable to move RLE without assistances. Patient did ambulate to bathroom on shift with standby assist. Patient dragged right LE while ambulating from bathroom. Patient right hand is still swollen and patient is c/o pain in hand. Patient had X-ray of right hand completed on 7/14 but did not reveal any fractures. No nose bleeds reported on shift. Patient refused yesterday 's dose of coumadin during day shift. PRN pain medication given on shift d/t c/o of right hand. Patient had two episodes of asymptomatic VTach on shift and PA notified. Patient refused BMP lab drawn. Patient educated on the importance of rechecking electrolytes d/t being on Lasix, but still refused. Lasix 80mg PO given BID, will f/u with labs this morning. Patient continues to chew pills d/t patient having trouble swallowing per patient.  Plan of care reviewed with patient. Patient verbalizes understanding of plan.  Will continue to monitor.

## 2019-07-15 NOTE — SUBJECTIVE & OBJECTIVE
Principal Problem:Acute on chronic combined systolic and diastolic heart failure    Principal Orthopedic Problem: R RF PIP Pain    Interval History: Pt seen and examined at bedside. PRAKASH. Contacted by hospital medicine team that patient Tricia reports 7 day history of R ring finger pain. He reports it is very painful to move. Reports tenderness to palpation. He denies fever or chills. He denies recent trauma or IVDU. Orthopedics was recently consulted to evaluate R shoulder pain which has since improved significantly but still is painful.     Review of patient's allergies indicates:   Allergen Reactions    Acetaminophen      Itching    Oxycodone-acetaminophen      Other reaction(s): Itching    Ace inhibitors Other (See Comments)     cough       Current Facility-Administered Medications   Medication    albuterol inhaler 1 puff    aspirin EC tablet 81 mg    calcium carbonate 200 mg calcium (500 mg) chewable tablet 500 mg    diphenhydrAMINE capsule 25 mg    ergocalciferol capsule 50,000 Units    ferrous sulfate EC tablet 325 mg    furosemide tablet 80 mg    gabapentin 250 mg/5 mL (5 mL) solution 100 mg    HYDROmorphone 1 mg/mL oral liquid 1 mg    isosorbide-hydrALAZINE 20-37.5 mg per tablet 1 tablet    methocarbamol tablet 500 mg    methyl salicylate-menthol 15-10% cream    metoprolol tartrate (LOPRESSOR) tablet 25 mg    nitroGLYCERIN SL tablet 0.4 mg    oxymetazoline 0.05 % nasal spray 2 spray    pantoprazole EC tablet 40 mg    polyethylene glycol packet 17 g    ramelteon tablet 8 mg    senna-docusate 8.6-50 mg per tablet 1 tablet    sodium chloride 0.9% flush 10 mL    sodium chloride 0.9% flush 10 mL    warfarin (COUMADIN) tablet 7.5 mg     Objective:     Vital Signs (Most Recent):  Temp: 98 °F (36.7 °C) (07/15/19 1600)  Pulse: (!) 119 (07/15/19 1600)  Resp: 16 (07/15/19 0903)  BP: (!) 141/94 (07/15/19 1600)  SpO2: 96 % (07/15/19 1600) Vital Signs (24h Range):  Temp:  [96 °F (35.6 °C)-98.7 °F  "(37.1 °C)] 98 °F (36.7 °C)  Pulse:  [] 119  Resp:  [16-18] 16  SpO2:  [91 %-97 %] 96 %  BP: (101-141)/(57-94) 141/94     Weight: 113.9 kg (251 lb 1.7 oz)  Height: 5' 9" (175.3 cm)  Body mass index is 37.08 kg/m².      Intake/Output Summary (Last 24 hours) at 7/15/2019 1618  Last data filed at 7/15/2019 0558  Gross per 24 hour   Intake 390 ml   Output 1975 ml   Net -1585 ml       Ortho/SPM Exam    R RF with moderate swelling over PIP joint  Mild swelling of dorsal hand with slight erythema.   TTP over RF dorsally and over 4th metacarpal  SILT M/U/R  Motor intact AIN/PIN/M/U/R   Cap refill < 2s  2+ RP    Significant Labs:   CBC:   Recent Labs   Lab 07/14/19  0503 07/15/19  0459   WBC 7.37 8.52   HGB 7.0* 6.7*   HCT 22.9* 22.7*    327     CRP:   Recent Labs   Lab 07/15/19  0459   CRP 23.3*     All pertinent labs within the past 24 hours have been reviewed.    Significant Imaging: I have reviewed and interpreted all pertinent imaging results/findings.   XR R hand shows no fx or dislocation  "

## 2019-07-15 NOTE — ASSESSMENT & PLAN NOTE
Pt is a 54 yo M being followed by orthopedic spine service for R shoulder pain and lower extremity weakness found to have no cause for pain on shoulder MRI or weakness after C/T/L spine MRI who now complains of R hand pain.     Contacted for this patient that orthopedic spine is already following regarding reported R hand pain and swelling. Pt does have swelling and pain over R RF DIP. He also has mild R hand swelling dorsally. No palpable fluctuance. No evidence of lesion or skin breakdown. Aspirated R RF PIP joint under c arm to send for cultures. Patient NPO pending results of aspiration.        Procedure Note:  After consent was obtained, using sterile technique and c-arm assistance the right ring finger PIP joint was entered and 0.2 cc of yellow joint fuild colored fluid was withdrawn and sent for culture and gram stain. Bandaid was applied. The procedure was well tolerated.

## 2019-07-15 NOTE — PLAN OF CARE
Problem: Physical Therapy Goal  Goal: Physical Therapy Goal  Goals to be met by: 2019     Patient will increase functional independence with mobility by performin. Supine to sit with Set-up [place] -not met  2. Sit to supine with Set-up [place]as[place] -not met  3. Sit to stand transfer with Stand-by Assistance -met 7/15  3a. Sit to stand transfer with Mod-I   4. Bed to chair transfer with Stand-by Assistance using Rolling Walker -not met  5. Gait  x 30 feet with Contact Guard Assistance using Rolling Walker. -not met  6. Lower extremity exercise program x20 reps per handout, with assistance as needed -not met      Outcome: Ongoing (interventions implemented as appropriate)  Goals reviewed and remain appropriate. Pt progressing towards goals.    Eboni Pittman, PT, DPT   7/15/2019  [phone]

## 2019-07-16 PROBLEM — M25.519 SHOULDER PAIN: Status: ACTIVE | Noted: 2019-07-04

## 2019-07-16 LAB
ALBUMIN SERPL BCP-MCNC: 3.3 G/DL (ref 3.5–5.2)
ALP SERPL-CCNC: 199 U/L (ref 55–135)
ALT SERPL W/O P-5'-P-CCNC: 12 U/L (ref 10–44)
ANION GAP SERPL CALC-SCNC: 17 MMOL/L (ref 8–16)
AST SERPL-CCNC: 27 U/L (ref 10–40)
BASOPHILS # BLD AUTO: 0.04 K/UL (ref 0–0.2)
BASOPHILS NFR BLD: 0.5 % (ref 0–1.9)
BILIRUB SERPL-MCNC: 0.7 MG/DL (ref 0.1–1)
BUN SERPL-MCNC: 76 MG/DL (ref 6–20)
CALCIUM SERPL-MCNC: 10 MG/DL (ref 8.7–10.5)
CHLORIDE SERPL-SCNC: 92 MMOL/L (ref 95–110)
CO2 SERPL-SCNC: 31 MMOL/L (ref 23–29)
CREAT SERPL-MCNC: 1.9 MG/DL (ref 0.5–1.4)
DIFFERENTIAL METHOD: ABNORMAL
EOSINOPHIL # BLD AUTO: 0.2 K/UL (ref 0–0.5)
EOSINOPHIL NFR BLD: 2.7 % (ref 0–8)
ERYTHROCYTE [DISTWIDTH] IN BLOOD BY AUTOMATED COUNT: 17.4 % (ref 11.5–14.5)
EST. GFR  (AFRICAN AMERICAN): 45.8 ML/MIN/1.73 M^2
EST. GFR  (NON AFRICAN AMERICAN): 39.7 ML/MIN/1.73 M^2
GLUCOSE SERPL-MCNC: 143 MG/DL (ref 70–110)
HCT VFR BLD AUTO: 22.2 % (ref 40–54)
HGB BLD-MCNC: 6.7 G/DL (ref 14–18)
IMM GRANULOCYTES # BLD AUTO: 0.03 K/UL (ref 0–0.04)
IMM GRANULOCYTES NFR BLD AUTO: 0.4 % (ref 0–0.5)
INR PPP: 1.9 (ref 0.8–1.2)
LYMPHOCYTES # BLD AUTO: 1 K/UL (ref 1–4.8)
LYMPHOCYTES NFR BLD: 12.3 % (ref 18–48)
MAGNESIUM SERPL-MCNC: 2.1 MG/DL (ref 1.6–2.6)
MCH RBC QN AUTO: 24.1 PG (ref 27–31)
MCHC RBC AUTO-ENTMCNC: 30.2 G/DL (ref 32–36)
MCV RBC AUTO: 80 FL (ref 82–98)
MONOCYTES # BLD AUTO: 1.6 K/UL (ref 0.3–1)
MONOCYTES NFR BLD: 19.5 % (ref 4–15)
NEUTROPHILS # BLD AUTO: 5.2 K/UL (ref 1.8–7.7)
NEUTROPHILS NFR BLD: 64.6 % (ref 38–73)
NRBC BLD-RTO: 0 /100 WBC
OB PNL STL: POSITIVE
PHOSPHATE SERPL-MCNC: 3.4 MG/DL (ref 2.7–4.5)
PLATELET # BLD AUTO: 331 K/UL (ref 150–350)
PMV BLD AUTO: 9.9 FL (ref 9.2–12.9)
POTASSIUM SERPL-SCNC: 3.5 MMOL/L (ref 3.5–5.1)
PROT SERPL-MCNC: 8.6 G/DL (ref 6–8.4)
PROTHROMBIN TIME: 18.9 SEC (ref 9–12.5)
RBC # BLD AUTO: 2.78 M/UL (ref 4.6–6.2)
SODIUM SERPL-SCNC: 140 MMOL/L (ref 136–145)
URATE SERPL-MCNC: 16.6 MG/DL (ref 3.4–7)
WBC # BLD AUTO: 8.07 K/UL (ref 3.9–12.7)

## 2019-07-16 PROCEDURE — 25000003 PHARM REV CODE 250: Performed by: STUDENT IN AN ORGANIZED HEALTH CARE EDUCATION/TRAINING PROGRAM

## 2019-07-16 PROCEDURE — 85610 PROTHROMBIN TIME: CPT

## 2019-07-16 PROCEDURE — 25000003 PHARM REV CODE 250: Performed by: INTERNAL MEDICINE

## 2019-07-16 PROCEDURE — 80053 COMPREHEN METABOLIC PANEL: CPT

## 2019-07-16 PROCEDURE — 20600001 HC STEP DOWN PRIVATE ROOM

## 2019-07-16 PROCEDURE — 83735 ASSAY OF MAGNESIUM: CPT

## 2019-07-16 PROCEDURE — 99232 PR SUBSEQUENT HOSPITAL CARE,LEVL II: ICD-10-PCS | Mod: ,,, | Performed by: NURSE PRACTITIONER

## 2019-07-16 PROCEDURE — 36415 COLL VENOUS BLD VENIPUNCTURE: CPT

## 2019-07-16 PROCEDURE — 25000003 PHARM REV CODE 250: Performed by: HOSPITALIST

## 2019-07-16 PROCEDURE — 84550 ASSAY OF BLOOD/URIC ACID: CPT

## 2019-07-16 PROCEDURE — 25000003 PHARM REV CODE 250: Performed by: PHYSICIAN ASSISTANT

## 2019-07-16 PROCEDURE — 99232 PR SUBSEQUENT HOSPITAL CARE,LEVL II: ICD-10-PCS | Mod: ,,, | Performed by: INTERNAL MEDICINE

## 2019-07-16 PROCEDURE — 84100 ASSAY OF PHOSPHORUS: CPT

## 2019-07-16 PROCEDURE — 82272 OCCULT BLD FECES 1-3 TESTS: CPT

## 2019-07-16 PROCEDURE — 99232 SBSQ HOSP IP/OBS MODERATE 35: CPT | Mod: ,,, | Performed by: NURSE PRACTITIONER

## 2019-07-16 PROCEDURE — 63600175 PHARM REV CODE 636 W HCPCS: Performed by: INTERNAL MEDICINE

## 2019-07-16 PROCEDURE — 99232 SBSQ HOSP IP/OBS MODERATE 35: CPT | Mod: ,,, | Performed by: INTERNAL MEDICINE

## 2019-07-16 PROCEDURE — 85025 COMPLETE CBC W/AUTO DIFF WBC: CPT

## 2019-07-16 RX ORDER — POTASSIUM CHLORIDE 20 MEQ/1
40 TABLET, EXTENDED RELEASE ORAL ONCE
Status: DISCONTINUED | OUTPATIENT
Start: 2019-07-16 | End: 2019-07-18 | Stop reason: HOSPADM

## 2019-07-16 RX ORDER — SODIUM FERRIC GLUCONATE COMPLEX IN SUCROSE 12.5 MG/ML
125 INJECTION INTRAVENOUS ONCE
Status: DISCONTINUED | OUTPATIENT
Start: 2019-07-16 | End: 2019-07-16

## 2019-07-16 RX ORDER — POTASSIUM CHLORIDE 20 MEQ/15ML
40 SOLUTION ORAL ONCE
Status: DISCONTINUED | OUTPATIENT
Start: 2019-07-16 | End: 2019-07-16

## 2019-07-16 RX ORDER — WARFARIN 7.5 MG/1
7.5 TABLET ORAL DAILY
Status: CANCELLED | OUTPATIENT
Start: 2019-07-16

## 2019-07-16 RX ORDER — PREDNISONE 20 MG/1
40 TABLET ORAL DAILY
Status: DISCONTINUED | OUTPATIENT
Start: 2019-07-16 | End: 2019-07-18 | Stop reason: HOSPADM

## 2019-07-16 RX ADMIN — FUROSEMIDE 80 MG: 80 TABLET ORAL at 09:07

## 2019-07-16 RX ADMIN — HYDRALAZINE HYDROCHLORIDE AND ISOSORBIDE DINITRATE 1 TABLET: 37.5; 2 TABLET, FILM COATED ORAL at 09:07

## 2019-07-16 RX ADMIN — POLYETHYLENE GLYCOL 3350 17 G: 17 POWDER, FOR SOLUTION ORAL at 09:07

## 2019-07-16 RX ADMIN — METOPROLOL TARTRATE 25 MG: 25 TABLET, FILM COATED ORAL at 06:07

## 2019-07-16 RX ADMIN — MENTHOL, METHYL SALICYLATE: 10; 15 CREAM TOPICAL at 03:07

## 2019-07-16 RX ADMIN — WARFARIN SODIUM 8 MG: 3 TABLET ORAL at 04:07

## 2019-07-16 RX ADMIN — PANTOPRAZOLE SODIUM 40 MG: 40 TABLET, DELAYED RELEASE ORAL at 09:07

## 2019-07-16 RX ADMIN — METOPROLOL TARTRATE 25 MG: 25 TABLET, FILM COATED ORAL at 11:07

## 2019-07-16 RX ADMIN — PREDNISONE 40 MG: 20 TABLET ORAL at 09:07

## 2019-07-16 RX ADMIN — SENNOSIDES,DOCUSATE SODIUM 1 TABLET: 8.6; 5 TABLET, FILM COATED ORAL at 09:07

## 2019-07-16 RX ADMIN — GABAPENTIN 100 MG: 250 SOLUTION ORAL at 09:07

## 2019-07-16 RX ADMIN — ASPIRIN 81 MG: 81 TABLET, COATED ORAL at 09:07

## 2019-07-16 RX ADMIN — FUROSEMIDE 80 MG: 80 TABLET ORAL at 06:07

## 2019-07-16 RX ADMIN — ERGOCALCIFEROL 50000 UNITS: 1.25 CAPSULE ORAL at 09:07

## 2019-07-16 RX ADMIN — METHOCARBAMOL TABLETS 500 MG: 500 TABLET, COATED ORAL at 09:07

## 2019-07-16 RX ADMIN — METOPROLOL TARTRATE 25 MG: 25 TABLET, FILM COATED ORAL at 12:07

## 2019-07-16 RX ADMIN — MENTHOL, METHYL SALICYLATE: 10; 15 CREAM TOPICAL at 09:07

## 2019-07-16 RX ADMIN — METOPROLOL TARTRATE 25 MG: 25 TABLET, FILM COATED ORAL at 05:07

## 2019-07-16 RX ADMIN — HYDROMORPHONE HYDROCHLORIDE 1 MG: 1 SOLUTION ORAL at 11:07

## 2019-07-16 RX ADMIN — SODIUM CHLORIDE 125 MG: 9 INJECTION, SOLUTION INTRAVENOUS at 09:07

## 2019-07-16 NOTE — PLAN OF CARE
Problem: Adult Inpatient Plan of Care  Goal: Plan of Care Review  Outcome: Ongoing (interventions implemented as appropriate)  Pt free of fall, injuries, and trauma. Skin clean and dry. Skin tear located to LLE and lower back. Pt educated on pain management. Need to collect occult stool sample.Pt received 1 time dose of 4 mg IV morphine for pain to RT hand. Pt adequately diuresing. Reviewed plan of care with pt. Pt verbalized understanding. Pt is AAO X 3. VSS. NADN. Will continue to monitor.

## 2019-07-16 NOTE — NURSING
Pt refused am potassium replacement spoke to hospitalist informed he would would speack with pt on am rounds

## 2019-07-16 NOTE — PLAN OF CARE
07/16/19 1010   Post-Acute Status   Post-Acute Authorization Other   Other Status No Post-Acute Service Needs

## 2019-07-16 NOTE — ASSESSMENT & PLAN NOTE
Pt is a 54 yo M with severe CHF who presents with acute onset of RUE pain, total RLE weakness, and R foot numbness. MRI of CTL spine do not indicate source of neuro deficits. No cord or foraminal compression seen. Upper extremity pain is diffuse and not limited to the shoulder joint. MRI obtained of the R shoulder shows no evidence of joint effusion. There is some edema noted between muscular layers, possibly suggestive of a rotator cuff tear. Cultures have NGTD. No concern for septic arthritis of the shoulder.

## 2019-07-16 NOTE — PLAN OF CARE
Problem: Adult Inpatient Plan of Care  Goal: Plan of Care Review  Outcome: Ongoing (interventions implemented as appropriate)  Pt free of falls/trauma/injuries.  Denies c/o SOB, CP, or discomfort.  Generalized skin remains CDI; moderated generalized  edema noted.  Pt conitnues being diuresed with lasix 80mg po ; diuresing well.Pt continues to c/o pain to left shoulder and is receiving   hydromorphine 1mg/ml for pain to help with the pain. Pt continues to have swelling to right hand  Instructed to elevate to help with swelling.Neurology consulted and is following pt.Pt had a potassium supplement ordered he refuse notified primary told he would speak with pt.  Pt tolerating plan of care

## 2019-07-16 NOTE — PROGRESS NOTES
[facility]  Orthopedics  Progress Note    Patient Name: Hamlet Terrell  MRN: [id number]  Admission Date: 7/2/2019  Hospital Length of Stay: 12 days  Attending Provider: Jonathan Rizo MD  Primary Care Provider: Carlin Swift MD    Subjective:     Principal Problem:Acute on chronic combined systolic and diastolic heart failure    Principal Orthopedic Problem: R RF PIP Pain    Interval History: Pt seen and examined at bedside. Persistent swelling of R ring finger PIPJ. No change from yesterday. Denies pain in any other joints.    Review of patient's allergies indicates:   Allergen Reactions    Acetaminophen      Itching    Oxycodone-acetaminophen      Other reaction(s): Itching    Ace inhibitors Other (See Comments)     cough       Current Facility-Administered Medications   Medication    albuterol inhaler 1 puff    aspirin EC tablet 81 mg    calcium carbonate 200 mg calcium (500 mg) chewable tablet 500 mg    diphenhydrAMINE capsule 25 mg    ergocalciferol capsule 50,000 Units    furosemide tablet 80 mg    gabapentin 250 mg/5 mL (5 mL) solution 100 mg    HYDROmorphone 1 mg/mL oral liquid 1 mg    isosorbide-hydrALAZINE 20-37.5 mg per tablet 1 tablet    methocarbamol tablet 500 mg    methyl salicylate-menthol 15-10% cream    metoprolol tartrate (LOPRESSOR) tablet 25 mg    nitroGLYCERIN SL tablet 0.4 mg    oxymetazoline 0.05 % nasal spray 2 spray    pantoprazole EC tablet 40 mg    polyethylene glycol packet 17 g    potassium chloride SA CR tablet 40 mEq    predniSONE tablet 40 mg    ramelteon tablet 8 mg    senna-docusate 8.6-50 mg per tablet 1 tablet    sodium chloride 0.9% flush 10 mL    sodium chloride 0.9% flush 10 mL    warfarin tablet 8 mg     Objective:     Vital Signs (Most Recent):  Temp: 98 °F (36.7 °C) (07/16/19 1117)  Pulse: 85 (07/16/19 1500)  Resp: 16 (07/16/19 0915)  BP: 131/71 (07/16/19 1117)  SpO2: 96 % (07/16/19 1117) Vital Signs (24h Range):  Temp:   "[97.9 °F (36.6 °C)-98.7 °F (37.1 °C)] 98 °F (36.7 °C)  Pulse:  [] 85  Resp:  [16-18] 16  SpO2:  [90 %-96 %] 96 %  BP: (103-141)/(55-94) 131/71     Weight: (patient refuse..nurse was told)  Height: 5' 9" (175.3 cm)  Body mass index is 37.08 kg/m².      Intake/Output Summary (Last 24 hours) at 7/16/2019 1526  Last data filed at 7/16/2019 0500  Gross per 24 hour   Intake 236 ml   Output 1575 ml   Net -1339 ml       Ortho/SPM Exam      R RF with moderate swelling over PIP joint  Mild swelling of dorsal hand  TTP over RF dorsally, most pronounced over PIPJ extending into dorsum of hand  No TTP any other fingers or small joints  SILT M/U/R   Motor intact AIN/PIN/M/U/R   Cap refill < 2s  2+ RP    Significant Labs:   CBC:   Recent Labs   Lab 07/15/19  0459 07/16/19  0423   WBC 8.52 8.07   HGB 6.7* 6.7*   HCT 22.7* 22.2*    331     CRP:   Recent Labs   Lab 07/15/19  0459   CRP 23.3*     All pertinent labs within the past 24 hours have been reviewed.    Significant Imaging: I have reviewed and interpreted all pertinent imaging results/findings.   XR R hand shows no fx or dislocation    Assessment/Plan:     Injury of right ring finger  Pt is a 52 yo M being followed by orthopedic spine service for R shoulder pain and lower extremity weakness found to have no cause for pain on shoulder MRI or weakness after C/T/L spine MRI who now complains of R ring finger PIPJ pain and swelling. Low concern for infection in this afebrile patient without a leukocytosis and no other source of infection, however joint aspirated as a precaution. Uric acid ordered. Will follow culture results. In the mean time, recommend treatment with NSAIDs or steroids as warranted. For further recommendations, please consult rheumatology.    R RF PIP joint aspirated 7/15 - fluid sent for cultures and gram stain  Cultures pending, gram stain negative    Shoulder pain, right  Pt is a 52 yo M with severe CHF who presents with acute onset of RUE pain, " total RLE weakness, and R foot numbness. MRI of CTL spine do not indicate source of neuro deficits. No cord or foraminal compression seen. Upper extremity pain is diffuse and not limited to the shoulder joint. MRI obtained of the R shoulder shows no evidence of joint effusion. There is some edema noted between muscular layers, possibly suggestive of a rotator cuff tear. Cultures have NGTD. No concern for septic arthritis of the shoulder.           Antoinette Rizo MD  Orthopedics  [facility]

## 2019-07-16 NOTE — PROGRESS NOTES
Hospital Medicine  Progress Note      Patient Name: Hamlet Terrell  MRN: [id number]  Date of Admission: 7/2/2019     Principal Problem: Acute on chronic combined systolic and diastolic heart failure     Subjective     Mr. Terrell was frustrated this morning. He has had continued pain and swelling from his right hand and knuckles. Ortho aspirated yesterday and sent for culture and gram stain. (The latter was negative. There was not enough fluid for cell count). Discussed with ortho today who recommend treatment for acute gout. Started on prednisone for the same.    Review of Systems    Constitutional: Negative for fever.   Cardiovascular: Positive for leg swelling. Negative for chest pain.   Musculoskeletal: Positive for joint pain.   Neurological: Positive for weakness.     Medications  Scheduled Meds:   aspirin  81 mg Oral Daily    ergocalciferol  50,000 Units Oral Q7 Days    furosemide  80 mg Oral BID    gabapentin  100 mg Oral BID    isosorbide-hydrALAZINE 20-37.5 mg  1 tablet Oral BID    methocarbamol  500 mg Oral BID PC    methyl salicylate-menthol 15-10%   Topical (Top) TID    metoprolol tartrate  25 mg Oral Q6H    pantoprazole  40 mg Oral Daily    polyethylene glycol  17 g Oral BID    potassium chloride  40 mEq Oral Once    predniSONE  40 mg Oral Daily    senna-docusate 8.6-50 mg  1 tablet Oral BID    warfarin  8 mg Oral Daily     Continuous Infusions:  PRN Meds:.albuterol, calcium carbonate, diphenhydrAMINE, HYDROmorphone, nitroGLYCERIN, oxymetazoline, ramelteon, sodium chloride 0.9%, sodium chloride 0.9%    Objective    Physical Examination    Temp:  [97.9 °F (36.6 °C)-98.7 °F (37.1 °C)]   Pulse:  []   Resp:  [16-18]   BP: (103-141)/(55-94)   SpO2:  [90 %-96 %]     Head: NC, AT  Throat: MMM, OP clear  CV: Irregularly irregular rhythm, normal rate, no M/R/G, no peripheral edema, no JVD  Resp: CTAB, no increased work of breathing on room air  GI: Soft, NT, ND, +BS  Ext: Swelling present in  dorsum of right hand. Right ring finger PIP joint swollen and erythematous. Exquisitely tender to palpation.   Neuro: AAOx3, CN grossly intact, no focal neurologic deficits  Psychiatry: Normal mood, normal affect, no SI/HI    CBC  Recent Labs   Lab 07/14/19  0503 07/15/19  0459 07/16/19  0423   WBC 7.37 8.52 8.07   HGB 7.0* 6.7* 6.7*   HCT 22.9* 22.7* 22.2*    327 331     CMP  Recent Labs   Lab 07/14/19  0503 07/15/19  0459 07/16/19  0420    141 140   K 3.5 3.8 3.5   CL 88* 92* 92*   CO2 31* 29 31*   BUN 84* 84* 76*   CREATININE 2.0* 1.8* 1.9*   * 109 143*   CALCIUM 10.2 10.6* 10.0   MG 2.3 2.3 2.1   PHOS 3.1 3.4 3.4   ALKPHOS 199* 198* 199*   ALT 10 11 12   AST 24 23 27   ALBUMIN 3.3* 3.2* 3.3*   PROT 8.9* 9.0* 8.6*   BILITOT 0.7 0.7 0.7   INR 2.2* 2.0* 1.9*       Assessment and Plan:    Acute on Chronic Combined systolic/diastolic heart failure     - Cardiology consulted, appreciate assistance. No longer following actively  - c/b cardiorenal JEAN-PIERRE  - Acute portion resolving, now back on home lasix regimen  - Repeat ECHO ordered; cancelled by Cardiology  - Converted diuresis to home oral dose 7/9 per Cardiology's recommendation  - Continuing home Lasix regimen and fluid restriction    Acute gout flare    - Elevated uric acid level noted earlier in admission and treated with colchicine with minimal symptoms  - Right right finger now with pain and swelling. Ortho following  - s/p R RF PIP arthrocentesis yesterday, cultures in process  - Started prednisone     Right sided weakness & acute pain     - IV opiates discontinued, on oral morphine PRN, changed to liquid due to history of chewing pills.  -Gabapentin renal-dose ordered, initially refusing but now taking, titrate dose as tolerated.    -etiology for pian and weakness remains unclear, s/p MRI Brain/C/T/L spine  -s/p Right shoulder arthrocentesis - gram stain negative, culture Aerobic with No Growth  -Neurology consulted: concern for  "non-pathologic pattern/etiology of pain and weakness    -Continue OT/PT; plan for SNF  -complaints of migratory muscle pain persists; continue OT/PT     Febrile Illness - resolved     Atrial Fibrillation, PAF     -Beta-blocker changed to Lopressor 25mg q6hrs.   -Avoiding sustained-release BB (Toprol) due to patient's habit of chewing pills.  - Episodes of asymptomatic NSVT and PVCs; EKG showed A-fib with PAC  - Warfarin increased to 8 mg today. PharmD following  - Goal K 4-5 and Mg 2-3; he occasionally refuses despite discussion of risks     JEAN-PIERRE on CKD     - Improving  - Continue diuresis for cardiorenal syndrome per cardiolgy and nephrology.  - Work up for paraproteinemia negative.     Hx of VTE    - INR 1.9 today  - Increased warfarin to 8 mg given previous borderline INR. Suspect the current dip is from prior refusal  - If persistently subtherapeutic will bridge with lovenox    Anemia of iron deficiency    -Stable  -normal ferritin but low serum iron and saturation   -Pt declines blood product transfusion, refusal signed. "if I get sick I want to be able to [place] the hospital" - Mr. Terrell on 7/16  -repeat iron studies/retic - retic count low, serum and saturated iron very low. Oral iron supplementation started, now discontinued in favor of IV iron starting 7/16. Will trend response.   - FOBT pending though no evidence of GI bleeding. He did have one episode of epistaxis 7/14.     High Risk Conditions  Patient is currently on drug therapy requiring intensive monitoring for toxicity: Warfarin    Diet: Cardiac 1500 cc fluid restriction  VTE PPX: Warfarin    Goals of care: Curative, full code      Jonathan Rizo M.D.  Department of Hospital Medicine  [facility]  [phone] (pager)    "

## 2019-07-16 NOTE — SUBJECTIVE & OBJECTIVE
"Principal Problem:Acute on chronic combined systolic and diastolic heart failure    Principal Orthopedic Problem: R RF PIP Pain    Interval History: Pt seen and examined at bedside. Persistent swelling of R ring finger PIPJ. No change from yesterday. Denies pain in any other joints.    Review of patient's allergies indicates:   Allergen Reactions    Acetaminophen      Itching    Oxycodone-acetaminophen      Other reaction(s): Itching    Ace inhibitors Other (See Comments)     cough       Current Facility-Administered Medications   Medication    albuterol inhaler 1 puff    aspirin EC tablet 81 mg    calcium carbonate 200 mg calcium (500 mg) chewable tablet 500 mg    diphenhydrAMINE capsule 25 mg    ergocalciferol capsule 50,000 Units    furosemide tablet 80 mg    gabapentin 250 mg/5 mL (5 mL) solution 100 mg    HYDROmorphone 1 mg/mL oral liquid 1 mg    isosorbide-hydrALAZINE 20-37.5 mg per tablet 1 tablet    methocarbamol tablet 500 mg    methyl salicylate-menthol 15-10% cream    metoprolol tartrate (LOPRESSOR) tablet 25 mg    nitroGLYCERIN SL tablet 0.4 mg    oxymetazoline 0.05 % nasal spray 2 spray    pantoprazole EC tablet 40 mg    polyethylene glycol packet 17 g    potassium chloride SA CR tablet 40 mEq    predniSONE tablet 40 mg    ramelteon tablet 8 mg    senna-docusate 8.6-50 mg per tablet 1 tablet    sodium chloride 0.9% flush 10 mL    sodium chloride 0.9% flush 10 mL    warfarin tablet 8 mg     Objective:     Vital Signs (Most Recent):  Temp: 98 °F (36.7 °C) (07/16/19 1117)  Pulse: 85 (07/16/19 1500)  Resp: 16 (07/16/19 0915)  BP: 131/71 (07/16/19 1117)  SpO2: 96 % (07/16/19 1117) Vital Signs (24h Range):  Temp:  [97.9 °F (36.6 °C)-98.7 °F (37.1 °C)] 98 °F (36.7 °C)  Pulse:  [] 85  Resp:  [16-18] 16  SpO2:  [90 %-96 %] 96 %  BP: (103-141)/(55-94) 131/71     Weight: (patient refuse..nurse was told)  Height: 5' 9" (175.3 cm)  Body mass index is 37.08 kg/m².      Intake/Output " Summary (Last 24 hours) at 7/16/2019 1526  Last data filed at 7/16/2019 0500  Gross per 24 hour   Intake 236 ml   Output 1575 ml   Net -1339 ml       Ortho/SPM Exam      R RF with moderate swelling over PIP joint  Mild swelling of dorsal hand  TTP over RF dorsally, most pronounced over PIPJ extending into dorsum of hand  No TTP any other fingers or small joints  SILT M/U/R   Motor intact AIN/PIN/M/U/R   Cap refill < 2s  2+ RP    Significant Labs:   CBC:   Recent Labs   Lab 07/15/19  0459 07/16/19  0423   WBC 8.52 8.07   HGB 6.7* 6.7*   HCT 22.7* 22.2*    331     CRP:   Recent Labs   Lab 07/15/19  0459   CRP 23.3*     All pertinent labs within the past 24 hours have been reviewed.    Significant Imaging: I have reviewed and interpreted all pertinent imaging results/findings.   XR R hand shows no fx or dislocation

## 2019-07-16 NOTE — PROGRESS NOTES
Notified Dr. Grace pt is experiencing severe pain from his right hand from a procedure earlier Md ordered a one time dose of morphine. Will continue to monitor closely.

## 2019-07-16 NOTE — ASSESSMENT & PLAN NOTE
Pt is a 52 yo M being followed by orthopedic spine service for R shoulder pain and lower extremity weakness found to have no cause for pain on shoulder MRI or weakness after C/T/L spine MRI who now complains of R ring finger PIPJ pain and swelling. Low concern for infection in this afebrile patient without a leukocytosis and no other source of infection, however joint aspirated as a precaution. Uric acid ordered. Will follow culture results. In the mean time, recommend treatment with NSAIDs or steroids as warranted. For further recommendations, please consult rheumatology.    R RF PIP joint aspirated 7/15 - fluid sent for cultures and gram stain  Cultures pending, gram stain negative

## 2019-07-16 NOTE — PROGRESS NOTES
[facility]  Physical Medicine & Rehab  Progress Note    Patient Name: Hamlet Terrell  MRN: [id number]  Admission Date: 7/2/2019  Length of Stay: 12 days  Attending Physician: Jonathan Rizo MD    Subjective:     Principal Problem:Acute on chronic combined systolic and diastolic heart failure    Hospital Course:   7/8/19: Participated w/ PT & OT. Sit tos tand modA w/ RW. Ambulated 3 ft CGA- TotalA w/ RW. Grooming CGA. LBD totalA.   7/9/19: Participated w/ PT. Sit to stand modA w/ RW.   07/11/2019: Sit to stand Min x 2 ppl -CGA.  Ambulated 6 ft x 2 trials MaxA.  UBD and LBD Becky.  7/12/19: Patient refused OT & PT 2/2 R hand edema.   7/15/19: Participated w/ PT & OT. Sit to stand SBA. Ambulated 17 ft HHA- Becky x 2 ppl. Feeding mod(I). Pt declined UB dressing and grooming in bathroom.     Interval History 7/16/2019:  Patient is seen for follow-up rehab evaluation and recommendations: Particiapated w/ OT& PT yesterday. H&H continues to be low. Occult stool pending.     HPI, Past Medical, Family, and Social History remains the same as documented in the initial encounter.    Scheduled Medications:    aspirin  81 mg Oral Daily    ergocalciferol  50,000 Units Oral Q7 Days    furosemide  80 mg Oral BID    gabapentin  100 mg Oral BID    isosorbide-hydrALAZINE 20-37.5 mg  1 tablet Oral BID    methocarbamol  500 mg Oral BID PC    methyl salicylate-menthol 15-10%   Topical (Top) TID    metoprolol tartrate  25 mg Oral Q6H    pantoprazole  40 mg Oral Daily    polyethylene glycol  17 g Oral BID    potassium chloride  40 mEq Oral Once    predniSONE  40 mg Oral Daily    senna-docusate 8.6-50 mg  1 tablet Oral BID    warfarin  8 mg Oral Daily       Diagnostic Results:   Labs: Reviewed    PRN Medications: albuterol, calcium carbonate, diphenhydrAMINE, HYDROmorphone, nitroGLYCERIN, oxymetazoline, ramelteon, sodium chloride 0.9%, sodium chloride 0.9%    Review of Systems   Constitutional: Positive for  activity change. Negative for fatigue and fever.        R shoulder pain and R leg numbness and decreased mobility    HENT: Negative for trouble swallowing and voice change.    Respiratory: Negative for cough and shortness of breath.    Cardiovascular: Negative for chest pain and leg swelling.   Gastrointestinal: Negative for abdominal distention and abdominal pain.   Genitourinary: Negative for difficulty urinating and flank pain.   Musculoskeletal: Positive for gait problem. Negative for back pain.   Skin: Negative for color change and rash.   Neurological: Positive for weakness. Negative for speech difficulty and numbness.   Psychiatric/Behavioral: Negative for agitation and confusion.     Objective:     Vital Signs (Most Recent):  Temp: 97.9 °F (36.6 °C) (07/16/19 0915)  Pulse: 87 (07/16/19 0915)  Resp: 16 (07/16/19 0915)  BP: 111/82 (07/16/19 0915)  SpO2: (!) 90 % (07/16/19 0915)    Vital Signs (24h Range):  Temp:  [97.9 °F (36.6 °C)-98.7 °F (37.1 °C)] 97.9 °F (36.6 °C)  Pulse:  [] 87  Resp:  [16-18] 16  SpO2:  [90 %-96 %] 90 %  BP: (103-141)/(55-94) 111/82     Physical Exam   Constitutional: He is oriented to person, place, and time. He appears well-developed and well-nourished.   HENT:   Head: Normocephalic and atraumatic.   Eyes: Pupils are equal, round, and reactive to light. EOM are normal.   Neck: Neck supple.   Pulmonary/Chest: Effort normal. No respiratory distress.   Musculoskeletal: He exhibits edema (BLE and R hand).   - RUE and RLE painful upon movement, decreased muscle strength    Neurological: He is alert and oriented to person, place, and time. A sensory deficit (RLE) is present.   Skin: Skin is warm and dry.   Psychiatric: He has a normal mood and affect. His behavior is normal. Thought content normal.   Nursing note and vitals reviewed.    Assessment/Plan:      Acute on chronic combined systolic and diastolic heart failure   - Lasix continue home dose per Cards    Acute pain of right  shoulder due to trauma  - s/p Right shoulder arthrocentesis - gram stain negative, culture Aerobic with No Growth.    Impaired mobility  - Related to prolonged/acute hospital course.     Recommendations  -  Encourage mobility, OOB in chair at least 3 hours per day, and early ambulation as appropriate  -  PT/OT evaluate and treat  -  Pain management  -  Monitor for and prevent skin breakdown and pressure ulcers  · Early mobility, repositioning/weight shifting every 20-30 minutes when sitting, turn patient every 2 hours, proper mattress/overlay and chair cushioning, pressure relief/heel protector boots  -  DVT prophylaxis    -  Reviewed discharge options (IP rehab, SNF, HH therapy, and OP therapy)    JEAN-PIERRE (acute kidney injury)  - nephrology was consulted and recommending increase Lasix and avoid nephrotoxic meds    Personal history of venous thrombosis and embolism  - Coumadin    Atrial fibrillation, chronic  - Coumadin    Will continue to follow for occult stool result and discuss with rehab team for post acute care/rehab recommendation.    Mary Buitrago NP  Department of Physical Medicine & Rehab   [facility]

## 2019-07-16 NOTE — NURSING
Patient had a 10 beat run of V-tach. Pt is asymptotic. VSS. Dr. Robb Wiggins notified. Will continue to monitor.

## 2019-07-16 NOTE — SUBJECTIVE & OBJECTIVE
Interval History 7/16/2019:  Patient is seen for follow-up rehab evaluation and recommendations: Particiapated w/ OT 7 PT yesterday. H&H continues to be low. Occult stool pending.     HPI, Past Medical, Family, and Social History remains the same as documented in the initial encounter.    Scheduled Medications:    aspirin  81 mg Oral Daily    ergocalciferol  50,000 Units Oral Q7 Days    furosemide  80 mg Oral BID    gabapentin  100 mg Oral BID    isosorbide-hydrALAZINE 20-37.5 mg  1 tablet Oral BID    methocarbamol  500 mg Oral BID PC    methyl salicylate-menthol 15-10%   Topical (Top) TID    metoprolol tartrate  25 mg Oral Q6H    pantoprazole  40 mg Oral Daily    polyethylene glycol  17 g Oral BID    potassium chloride  40 mEq Oral Once    predniSONE  40 mg Oral Daily    senna-docusate 8.6-50 mg  1 tablet Oral BID    warfarin  8 mg Oral Daily       Diagnostic Results:   Labs: Reviewed    PRN Medications: albuterol, calcium carbonate, diphenhydrAMINE, HYDROmorphone, nitroGLYCERIN, oxymetazoline, ramelteon, sodium chloride 0.9%, sodium chloride 0.9%    Review of Systems   Constitutional: Positive for activity change. Negative for fatigue and fever.        R shoulder pain and R leg numbness and decreased mobility    HENT: Negative for trouble swallowing and voice change.    Respiratory: Negative for cough and shortness of breath.    Cardiovascular: Negative for chest pain and leg swelling.   Gastrointestinal: Negative for abdominal distention and abdominal pain.   Genitourinary: Negative for difficulty urinating and flank pain.   Musculoskeletal: Positive for gait problem. Negative for back pain.   Skin: Negative for color change and rash.   Neurological: Positive for weakness. Negative for speech difficulty and numbness.   Psychiatric/Behavioral: Negative for agitation and confusion.     Objective:     Vital Signs (Most Recent):  Temp: 97.9 °F (36.6 °C) (07/16/19 0915)  Pulse: 87 (07/16/19 0915)  Resp:  16 (07/16/19 0915)  BP: 111/82 (07/16/19 0915)  SpO2: (!) 90 % (07/16/19 0915)    Vital Signs (24h Range):  Temp:  [97.9 °F (36.6 °C)-98.7 °F (37.1 °C)] 97.9 °F (36.6 °C)  Pulse:  [] 87  Resp:  [16-18] 16  SpO2:  [90 %-96 %] 90 %  BP: (103-141)/(55-94) 111/82     Physical Exam   Constitutional: He is oriented to person, place, and time. He appears well-developed and well-nourished.   HENT:   Head: Normocephalic and atraumatic.   Eyes: Pupils are equal, round, and reactive to light. EOM are normal.   Neck: Neck supple.   Pulmonary/Chest: Effort normal. No respiratory distress.   Musculoskeletal: He exhibits edema (BLE and R hand).   - RUE and RLE painful upon movement, decreased muscle strength    Neurological: He is alert and oriented to person, place, and time. A sensory deficit (RLE) is present.   Skin: Skin is warm and dry.   Psychiatric: He has a normal mood and affect. His behavior is normal. Thought content normal.   Nursing note and vitals reviewed.    NEUROLOGICAL EXAMINATION:     MENTAL STATUS   Oriented to person, place, and time.     CRANIAL NERVES     CN III, IV, VI   Pupils are equal, round, and reactive to light.  Extraocular motions are normal.

## 2019-07-16 NOTE — PROGRESS NOTES
PHARMACY CONSULT NOTE    1) PHARMACY CONSULT NOTE: WARFARIN      Hamlet Terrell is a 53 y.o. male on warfarin therapy for Atrial Fibrillation. PharmD has been consulted for warfarin dosing.     Current order: warfarin 8mg daily    Home dose: 6 mg daily   Coumadin clinic enrollment: Active  INR goal: 2-3     Lab Results   Component Value Date    INR 1.9 (H) 07/16/2019    INR 2.0 (H) 07/15/2019    INR 2.2 (H) 07/14/2019     Significant drug interactions: Prednisone can increase INR (monitor)   Diet: Adult Cardiac     Recommendation(s):   · Continue warfarin 7.5 mg daily.   · INR subtherapeutic at 1.9 today likely due to patient missing dose on 07/14   · Pharmacy will continue to follow and monitor warfarin     2) PHARMACY CONSULT NOTE: Renal Dose Adjustment      Patient Data:    Vital Signs (Most Recent):  Temp: 98.2 °F (36.8 °C) (07/16/19 0500)  Pulse: 70 (07/16/19 0600)  Resp: 18 (07/16/19 0500)  BP: (!) 103/55 (07/16/19 0500)  SpO2: 95 % (07/16/19 0500)   Vital Signs (72h Range):  Temp:  [96 °F (35.6 °C)-98.7 °F (37.1 °C)]   Pulse:  []   Resp:  [16-18]   BP: ()/(53-94)   SpO2:  [91 %-98 %]      Recent Labs   Lab 07/14/19  0503 07/15/19  0459 07/16/19  0420   CREATININE 2.0* 1.8* 1.9*     Serum creatinine: 1.9 mg/dL (H) 07/16/19 0420  Estimated creatinine clearance: 56.6 mL/min (A)     Medications reviewed, no dose adjustments needed     Thank you for the consult,  Sofi Guerra, PharmD, BCPS, Internal Medicine Clinical Pharmacy Specialist  EXT [id number]     **Note: Consults are reviewed Monday-Friday 7:00am-3:30pm. THE ABOVE RECOMMENDATIONS ARE ONLY SUGGESTED.THE RECOMMENDATIONS SHOULD BE CONSIDERED IN CONJUNCTION WITH ALL PATIENT FACTORS. **

## 2019-07-17 LAB
ALBUMIN SERPL BCP-MCNC: 3.3 G/DL (ref 3.5–5.2)
ALP SERPL-CCNC: 217 U/L (ref 55–135)
ALT SERPL W/O P-5'-P-CCNC: 16 U/L (ref 10–44)
ANION GAP SERPL CALC-SCNC: 14 MMOL/L (ref 8–16)
AST SERPL-CCNC: 37 U/L (ref 10–40)
BASOPHILS # BLD AUTO: 0.04 K/UL (ref 0–0.2)
BASOPHILS NFR BLD: 0.5 % (ref 0–1.9)
BILIRUB SERPL-MCNC: 0.8 MG/DL (ref 0.1–1)
BUN SERPL-MCNC: 72 MG/DL (ref 6–20)
CALCIUM SERPL-MCNC: 10.1 MG/DL (ref 8.7–10.5)
CHLORIDE SERPL-SCNC: 94 MMOL/L (ref 95–110)
CO2 SERPL-SCNC: 32 MMOL/L (ref 23–29)
CREAT SERPL-MCNC: 1.8 MG/DL (ref 0.5–1.4)
DIFFERENTIAL METHOD: ABNORMAL
EOSINOPHIL # BLD AUTO: 0.3 K/UL (ref 0–0.5)
EOSINOPHIL NFR BLD: 3.3 % (ref 0–8)
ERYTHROCYTE [DISTWIDTH] IN BLOOD BY AUTOMATED COUNT: 17.8 % (ref 11.5–14.5)
EST. GFR  (AFRICAN AMERICAN): 48.9 ML/MIN/1.73 M^2
EST. GFR  (NON AFRICAN AMERICAN): 42.3 ML/MIN/1.73 M^2
GLUCOSE SERPL-MCNC: 147 MG/DL (ref 70–110)
HCT VFR BLD AUTO: 23.7 % (ref 40–54)
HGB BLD-MCNC: 6.8 G/DL (ref 14–18)
IMM GRANULOCYTES # BLD AUTO: 0.05 K/UL (ref 0–0.04)
IMM GRANULOCYTES NFR BLD AUTO: 0.6 % (ref 0–0.5)
INR PPP: 2 (ref 0.8–1.2)
LYMPHOCYTES # BLD AUTO: 1.1 K/UL (ref 1–4.8)
LYMPHOCYTES NFR BLD: 13.7 % (ref 18–48)
MAGNESIUM SERPL-MCNC: 2.1 MG/DL (ref 1.6–2.6)
MCH RBC QN AUTO: 23.5 PG (ref 27–31)
MCHC RBC AUTO-ENTMCNC: 28.7 G/DL (ref 32–36)
MCV RBC AUTO: 82 FL (ref 82–98)
MONOCYTES # BLD AUTO: 1.4 K/UL (ref 0.3–1)
MONOCYTES NFR BLD: 17.3 % (ref 4–15)
NEUTROPHILS # BLD AUTO: 5.2 K/UL (ref 1.8–7.7)
NEUTROPHILS NFR BLD: 64.6 % (ref 38–73)
NRBC BLD-RTO: 0 /100 WBC
PHOSPHATE SERPL-MCNC: 3.5 MG/DL (ref 2.7–4.5)
PLATELET # BLD AUTO: 349 K/UL (ref 150–350)
PMV BLD AUTO: 9.7 FL (ref 9.2–12.9)
POTASSIUM SERPL-SCNC: 3.6 MMOL/L (ref 3.5–5.1)
PROT SERPL-MCNC: 8.8 G/DL (ref 6–8.4)
PROTHROMBIN TIME: 19.3 SEC (ref 9–12.5)
RBC # BLD AUTO: 2.89 M/UL (ref 4.6–6.2)
SODIUM SERPL-SCNC: 140 MMOL/L (ref 136–145)
WBC # BLD AUTO: 8.1 K/UL (ref 3.9–12.7)

## 2019-07-17 PROCEDURE — 85610 PROTHROMBIN TIME: CPT

## 2019-07-17 PROCEDURE — 93010 EKG 12-LEAD: ICD-10-PCS | Mod: ,,, | Performed by: INTERNAL MEDICINE

## 2019-07-17 PROCEDURE — 25000003 PHARM REV CODE 250: Performed by: INTERNAL MEDICINE

## 2019-07-17 PROCEDURE — 99232 PR SUBSEQUENT HOSPITAL CARE,LEVL II: ICD-10-PCS | Mod: ,,, | Performed by: NURSE PRACTITIONER

## 2019-07-17 PROCEDURE — 84100 ASSAY OF PHOSPHORUS: CPT

## 2019-07-17 PROCEDURE — 25000003 PHARM REV CODE 250: Performed by: STUDENT IN AN ORGANIZED HEALTH CARE EDUCATION/TRAINING PROGRAM

## 2019-07-17 PROCEDURE — 36415 COLL VENOUS BLD VENIPUNCTURE: CPT

## 2019-07-17 PROCEDURE — 63600175 PHARM REV CODE 636 W HCPCS: Performed by: INTERNAL MEDICINE

## 2019-07-17 PROCEDURE — 93010 ELECTROCARDIOGRAM REPORT: CPT | Mod: ,,, | Performed by: INTERNAL MEDICINE

## 2019-07-17 PROCEDURE — 99239 PR HOSPITAL DISCHARGE DAY,>30 MIN: ICD-10-PCS | Mod: ,,, | Performed by: HOSPITALIST

## 2019-07-17 PROCEDURE — 99232 SBSQ HOSP IP/OBS MODERATE 35: CPT | Mod: ,,, | Performed by: NURSE PRACTITIONER

## 2019-07-17 PROCEDURE — 20600001 HC STEP DOWN PRIVATE ROOM

## 2019-07-17 PROCEDURE — 99239 HOSP IP/OBS DSCHRG MGMT >30: CPT | Mod: ,,, | Performed by: HOSPITALIST

## 2019-07-17 PROCEDURE — 83735 ASSAY OF MAGNESIUM: CPT

## 2019-07-17 PROCEDURE — 93005 ELECTROCARDIOGRAM TRACING: CPT

## 2019-07-17 PROCEDURE — 97530 THERAPEUTIC ACTIVITIES: CPT

## 2019-07-17 PROCEDURE — 80053 COMPREHEN METABOLIC PANEL: CPT

## 2019-07-17 PROCEDURE — 25000003 PHARM REV CODE 250: Performed by: HOSPITALIST

## 2019-07-17 PROCEDURE — 25000003 PHARM REV CODE 250: Performed by: PHYSICIAN ASSISTANT

## 2019-07-17 PROCEDURE — 85025 COMPLETE CBC W/AUTO DIFF WBC: CPT

## 2019-07-17 RX ORDER — METOPROLOL TARTRATE 25 MG/1
25 TABLET, FILM COATED ORAL EVERY 6 HOURS
Qty: 120 TABLET | Refills: 11 | Status: ON HOLD | OUTPATIENT
Start: 2019-07-17 | End: 2019-12-08 | Stop reason: SDUPTHER

## 2019-07-17 RX ORDER — POLYETHYLENE GLYCOL 3350 17 G/17G
17 POWDER, FOR SOLUTION ORAL 2 TIMES DAILY
Qty: 30 EACH | Refills: 0 | Status: ON HOLD | OUTPATIENT
Start: 2019-07-17 | End: 2019-12-06

## 2019-07-17 RX ORDER — HYDROMORPHONE HYDROCHLORIDE 5 MG/5ML
0.5 SOLUTION ORAL EVERY 4 HOURS PRN
Qty: 473 ML | Refills: 0 | Status: SHIPPED | OUTPATIENT
Start: 2019-07-17 | End: 2019-07-27

## 2019-07-17 RX ORDER — AMOXICILLIN 250 MG
1 CAPSULE ORAL 2 TIMES DAILY
Status: ON HOLD | COMMUNITY
Start: 2019-07-17 | End: 2019-12-06

## 2019-07-17 RX ORDER — ERGOCALCIFEROL 1.25 MG/1
50000 CAPSULE ORAL
Qty: 1 CAPSULE | Refills: 0 | Status: SHIPPED | OUTPATIENT
Start: 2019-07-23 | End: 2019-08-02

## 2019-07-17 RX ORDER — FUROSEMIDE 80 MG/1
80 TABLET ORAL 2 TIMES DAILY
Qty: 60 TABLET | Refills: 11 | Status: ON HOLD | OUTPATIENT
Start: 2019-07-17 | End: 2020-03-09 | Stop reason: HOSPADM

## 2019-07-17 RX ORDER — WARFARIN 4 MG/1
8 TABLET ORAL DAILY
Qty: 60 TABLET | Refills: 11 | Status: SHIPPED | OUTPATIENT
Start: 2019-07-17 | End: 2019-08-22 | Stop reason: SDUPTHER

## 2019-07-17 RX ORDER — METHOCARBAMOL 500 MG/1
500 TABLET, FILM COATED ORAL
Qty: 20 TABLET | Refills: 0 | Status: SHIPPED | OUTPATIENT
Start: 2019-07-17 | End: 2019-07-27

## 2019-07-17 RX ORDER — POTASSIUM CHLORIDE 20 MEQ/15ML
40 SOLUTION ORAL ONCE
Status: DISCONTINUED | OUTPATIENT
Start: 2019-07-17 | End: 2019-07-18 | Stop reason: HOSPADM

## 2019-07-17 RX ORDER — ISOSORBIDE DINITRATE AND HYDRALAZINE HYDROCHLORIDE 37.5; 2 MG/1; MG/1
1 TABLET ORAL 2 TIMES DAILY
Qty: 60 TABLET | Refills: 11 | Status: ON HOLD | OUTPATIENT
Start: 2019-07-17 | End: 2020-03-09 | Stop reason: HOSPADM

## 2019-07-17 RX ORDER — PREDNISONE 20 MG/1
40 TABLET ORAL DAILY
Qty: 18 TABLET | Refills: 0 | Status: SHIPPED | OUTPATIENT
Start: 2019-07-18 | End: 2019-07-27

## 2019-07-17 RX ORDER — RAMELTEON 8 MG/1
8 TABLET ORAL NIGHTLY PRN
Qty: 30 TABLET | Refills: 0 | Status: ON HOLD | OUTPATIENT
Start: 2019-07-17 | End: 2019-12-06

## 2019-07-17 RX ORDER — GABAPENTIN 250 MG/5ML
100 SOLUTION ORAL 2 TIMES DAILY
Qty: 150 ML | Refills: 0 | Status: ON HOLD | OUTPATIENT
Start: 2019-07-17 | End: 2019-12-06

## 2019-07-17 RX ADMIN — MENTHOL, METHYL SALICYLATE: 10; 15 CREAM TOPICAL at 05:07

## 2019-07-17 RX ADMIN — HYDROMORPHONE HYDROCHLORIDE 1 MG: 1 SOLUTION ORAL at 02:07

## 2019-07-17 RX ADMIN — METOPROLOL TARTRATE 25 MG: 25 TABLET, FILM COATED ORAL at 05:07

## 2019-07-17 RX ADMIN — METOPROLOL TARTRATE 25 MG: 25 TABLET, FILM COATED ORAL at 12:07

## 2019-07-17 RX ADMIN — MENTHOL, METHYL SALICYLATE: 10; 15 CREAM TOPICAL at 09:07

## 2019-07-17 RX ADMIN — WARFARIN SODIUM 8 MG: 3 TABLET ORAL at 05:07

## 2019-07-17 RX ADMIN — HYDRALAZINE HYDROCHLORIDE AND ISOSORBIDE DINITRATE 1 TABLET: 37.5; 2 TABLET, FILM COATED ORAL at 09:07

## 2019-07-17 RX ADMIN — METHOCARBAMOL TABLETS 500 MG: 500 TABLET, COATED ORAL at 09:07

## 2019-07-17 RX ADMIN — ASPIRIN 81 MG: 81 TABLET, COATED ORAL at 09:07

## 2019-07-17 RX ADMIN — FUROSEMIDE 80 MG: 80 TABLET ORAL at 05:07

## 2019-07-17 RX ADMIN — GABAPENTIN 100 MG: 250 SOLUTION ORAL at 09:07

## 2019-07-17 RX ADMIN — SENNOSIDES,DOCUSATE SODIUM 1 TABLET: 8.6; 5 TABLET, FILM COATED ORAL at 09:07

## 2019-07-17 RX ADMIN — PANTOPRAZOLE SODIUM 40 MG: 40 TABLET, DELAYED RELEASE ORAL at 09:07

## 2019-07-17 RX ADMIN — POLYETHYLENE GLYCOL 3350 17 G: 17 POWDER, FOR SOLUTION ORAL at 09:07

## 2019-07-17 RX ADMIN — HYDROMORPHONE HYDROCHLORIDE 1 MG: 1 SOLUTION ORAL at 09:07

## 2019-07-17 RX ADMIN — METOPROLOL TARTRATE 25 MG: 25 TABLET, FILM COATED ORAL at 11:07

## 2019-07-17 RX ADMIN — FUROSEMIDE 80 MG: 80 TABLET ORAL at 09:07

## 2019-07-17 RX ADMIN — PREDNISONE 40 MG: 20 TABLET ORAL at 09:07

## 2019-07-17 NOTE — PROGRESS NOTES
[facility]  Physical Medicine & Rehab  Progress Note    Patient Name: Hamlet Terrell  MRN: [id number]  Admission Date: 7/2/2019  Length of Stay: 13 days  Attending Physician: So Tyler MD    Subjective:     Principal Problem:Acute on chronic combined systolic and diastolic heart failure    Hospital Course:   7/8/19: Participated w/ PT & OT. Sit tos tand modA w/ RW. Ambulated 3 ft CGA- TotalA w/ RW. Grooming CGA. LBD totalA.   7/9/19: Participated w/ PT. Sit to stand modA w/ RW.   07/11/2019: Sit to stand Min x 2 ppl -CGA.  Ambulated 6 ft x 2 trials MaxA.  UBD and LBD [place].  7/12/19: Patient refused OT & PT 2/2 R hand edema.   7/15/19: Participated w/ PT & OT. Sit to stand SBA. Ambulated 17 ft HHA- [place] x 2 ppl. Feeding mod(I). Pt declined UB dressing and grooming in bathroom.   7/16/19: No PT or OT    Interval History 7/17/2019:  Patient is seen for follow-up rehab evaluation and recommendations: Occult stool positive. No therapy yesterday. H&H low.     HPI, Past Medical, Family, and Social History remains the same as documented in the initial encounter.    Scheduled Medications:    aspirin  81 mg Oral Daily    ergocalciferol  50,000 Units Oral Q7 Days    furosemide  80 mg Oral BID    gabapentin  100 mg Oral BID    isosorbide-hydrALAZINE 20-37.5 mg  1 tablet Oral BID    methocarbamol  500 mg Oral BID PC    methyl salicylate-menthol 15-10%   Topical (Top) TID    metoprolol tartrate  25 mg Oral Q6H    pantoprazole  40 mg Oral Daily    polyethylene glycol  17 g Oral BID    potassium chloride 10%  40 mEq Oral Once    potassium chloride  40 mEq Oral Once    predniSONE  40 mg Oral Daily    senna-docusate 8.6-50 mg  1 tablet Oral BID    warfarin  8 mg Oral Daily       Diagnostic Results:   Labs: Reviewed    PRN Medications: albuterol, calcium carbonate, diphenhydrAMINE, HYDROmorphone, nitroGLYCERIN, ramelteon, sodium chloride 0.9%, sodium chloride 0.9%    Review of Systems    Constitutional: Positive for activity change. Negative for fatigue and fever.        R shoulder pain and R leg numbness and decreased mobility    HENT: Negative for trouble swallowing and voice change.    Respiratory: Negative for cough and shortness of breath.    Cardiovascular: Negative for chest pain and leg swelling.   Gastrointestinal: Negative for abdominal distention and abdominal pain.   Genitourinary: Negative for difficulty urinating and flank pain.   Musculoskeletal: Positive for gait problem. Negative for back pain.   Skin: Negative for color change and rash.   Neurological: Positive for weakness. Negative for speech difficulty and numbness.   Psychiatric/Behavioral: Negative for agitation and confusion.     Objective:     Vital Signs (Most Recent):  Temp: 98.6 °F (37 °C) (07/17/19 0933)  Pulse: 87 (07/17/19 0933)  Resp: 18 (07/17/19 0933)  BP: 120/67 (07/17/19 0933)  SpO2: 95 % (07/17/19 0933)    Vital Signs (24h Range):  Temp:  [97.9 °F (36.6 °C)-99.5 °F (37.5 °C)] 98.6 °F (37 °C)  Pulse:  [] 87  Resp:  [16-19] 18  SpO2:  [93 %-96 %] 95 %  BP: (108-135)/(55-71) 120/67     Physical Exam   Constitutional: He is oriented to person, place, and time. He appears well-developed and well-nourished.   HENT:   Head: Normocephalic and atraumatic.   Eyes: Pupils are equal, round, and reactive to light. EOM are normal.   Neck: Neck supple.   Pulmonary/Chest: Effort normal. No respiratory distress.   Musculoskeletal: He exhibits edema (BLE and R hand).   - RUE and RLE painful upon movement, decreased muscle strength    Neurological: He is alert and oriented to person, place, and time. A sensory deficit (RLE) is present.   Skin: Skin is warm and dry.   Psychiatric: He has a normal mood and affect. His behavior is normal. Thought content normal.   Nursing note and vitals reviewed.    Assessment/Plan:      * Acute on chronic combined systolic and diastolic heart failure  - Lasix continue home dose per  Cards    Acute pain of right shoulder due to trauma  - s/p Right shoulder arthrocentesis - gram stain negative, culture Aerobic with No Growth.    Impaired mobility  - Related to prolonged/acute hospital course.     Recommendations  -  Encourage mobility, OOB in chair at least 3 hours per day, and early ambulation as appropriate  -  PT/OT evaluate and treat  -  Pain management  -  Monitor for and prevent skin breakdown and pressure ulcers  · Early mobility, repositioning/weight shifting every 20-30 minutes when sitting, turn patient every 2 hours, proper mattress/overlay and chair cushioning, pressure relief/heel protector boots  -  DVT prophylaxis    -  Reviewed discharge options (IP rehab, SNF, HH therapy, and OP therapy)    JEAN-PIERRE (acute kidney injury)  - nephrology was consulted and recommending increase Lasix and avoid nephrotoxic meds    Personal history of venous thrombosis and embolism  - Coumadin    Atrial fibrillation, chronic  - Coumadin    Encouraged full participation with therapy. Will follow progress and discuss with rehab team for post acute care/rehab recommendation.    Mary Buitrago NP  Department of Physical Medicine & Rehab   [facility]

## 2019-07-17 NOTE — PLAN OF CARE
[facility]     Department of Hospital Medicine     [address] [place] [address]     [phone] after hours  [phone] fax       FACILITY TRANSFER ORDERS    07/17/2019    Admit to Rehab                                                Diagnoses:  Active Hospital Problems    Diagnosis  POA    *Acute on chronic combined systolic and diastolic heart failure [I50.43]  Yes    Acute idiopathic gout of right hand [M10.041]  Unknown    Injury of right ring finger [S69.91XA]  Unknown    Dysphagia for pills, idiopathic [R13.10]  Yes     Patient chews pills and opens capsules -- avoid sustained-release formulations.      Shoulder pain [M25.519]  Unknown    Weakness of lower extremity [R29.898]  Yes    Acute pain of right shoulder due to trauma [M25.511, G89.11]  Yes    Neuropathy of both feet [G57.93]  Yes    Prediabetes [R73.03]  Yes    Venous stasis of lower extremity [I87.8]  Yes    Impaired mobility [Z74.09]  Yes    Elevated alkaline phosphatase level [R74.8]  Yes    JEAN-PIERRE (acute kidney injury) [N17.9]  Yes    Chronic kidney disease [N18.9]  Yes     Chronic    Lumbar back pain [M54.5]  Yes    Personal history of venous thrombosis and embolism [Z86.718]  Not Applicable    Atrial fibrillation, chronic [I48.2]  Yes     Chronic    Essential hypertension [I10]  Yes     Chronic    Chronic anticoagulation [Z79.01]  Not Applicable     Chronic    Non compliance w medication regimen [Z91.14]  Not Applicable     Chronic    History of CVA (cerebrovascular accident) [Z86.73]  Not Applicable     Personal account, occurred in 2008/2009 at [facility].        Resolved Hospital Problems    Diagnosis Date Resolved POA    Febrile illness, acute [R50.9] 07/09/2019 Yes       Patient is homebound due to:  Acute on chronic combined systolic and diastolic heart failure    Allergies:  Review of patient's allergies indicates:   Allergen Reactions    Acetaminophen       Itching    Oxycodone-acetaminophen      Other reaction(s): Itching    Ace inhibitors Other (See Comments)     cough       Vitals:   Every shift (Skilled Nursing patients)    Diet:  Supplement:  1 can every three times a day with meals                         Type:Glucerna         Acitivities:  Per PT   - Up in a chair each morning as tolerated   - Ambulate with assistance to bathroom   - Scheduled walks once each shift (every 8 hours)    LABS:  Per facility protocol    Nursing Precautions:   - Aspiration precautions:             - Total assistance with meals            -  Upright 90 degrees befor during and after meals             -  Suction at bedside          - Fall precautions per nursing home protocol   - Seizure precaution per [facility] protocol   - Decubitus precautions:        -  for positioning   - Pressure reducing foam mattress   - Turn patient every two hours. Use wedge pillows to anchor patient    CONSULTS:     Physical Therapy to evaluate and treat     Occupational Therapy to evaluate and treat     Speech Therapy  to evaluate and treat     Nutrition to evaluate and recommend diet     Psychiatry to evaluate and follow patients for delirium    MISCELLANEOUS CARE:      Routine Skin for Bedridden Patients:  Apply moisture barrier cream to all    skin folds and wet areas in perineal area daily and after baths and                           all bowel movements.  Medications: Discontinue all previous medication orders, if any. See new list below.   Hamlet Terrell   Home Medication Instructions ELKIN:[id number]    Printed on:07/17/19 0654   Medication Information                      albuterol (PROVENTIL/VENTOLIN HFA) 90 mcg/actuation inhaler  Inhale 1-2 puffs into the lungs every 6 (six) hours as needed for Wheezing. Rescue             aspirin (ECOTRIN) 81 MG EC tablet  Take 81 mg by mouth once daily.             calcium carbonate (TUMS) 200 mg calcium (500 mg) chewable tablet  Take 1 tablet  (500 mg total) by mouth 3 (three) times daily as needed.             ergocalciferol (ERGOCALCIFEROL) 50,000 unit Cap  Take 1 capsule (50,000 Units total) by mouth every 7 days. for 10 days             ferrous sulfate 325 (65 FE) MG EC tablet  Take 1 tablet (325 mg total) by mouth once daily.             furosemide (LASIX) 80 MG tablet  Take 1 tablet (80 mg total) by mouth 2 (two) times daily.             gabapentin (NEURONTIN) 250 mg/5 mL (5 mL) solution  Take 2 mLs (100 mg total) by mouth 2 (two) times daily.             HYDROmorphone 1 mg/mL Liqd  Take 0.5 mLs (0.5 mg total) by mouth every 4 (four) hours as needed.             isosorbide-hydrALAZINE 20-37.5 mg (BIDIL) 20-37.5 mg Tab  Take 1 tablet by mouth 2 (two) times daily.             methocarbamol (ROBAXIN) 500 MG Tab  Take 1 tablet (500 mg total) by mouth 2 times daily 2 hours after meal. for 10 days             methyl salicylate-menthol 15-10% 15-10 % Crea  Apply topically 3 (three) times daily.             metoprolol tartrate (LOPRESSOR) 25 MG tablet  Take 1 tablet (25 mg total) by mouth every 6 (six) hours.             nitroGLYCERIN (NITROSTAT) 0.4 MG SL tablet  Place 1 tablet (0.4 mg total) under the tongue every 5 (five) minutes as needed for Chest pain. Tablet, Sublingual Sublingual             pantoprazole (PROTONIX) 40 MG tablet  Take 1 tablet (40 mg total) by mouth once daily.             polyethylene glycol (GLYCOLAX) 17 gram PwPk  Take 17 g by mouth 2 (two) times daily.             predniSONE (DELTASONE) 20 MG tablet  Take 2 tablets (40 mg total) by mouth once daily. for 9 days             ramelteon (ROZEREM) 8 mg tablet  Take 1 tablet (8 mg total) by mouth nightly as needed for Insomnia.             senna-docusate 8.6-50 mg (PERICOLACE) 8.6-50 mg per tablet  Take 1 tablet by mouth 2 (two) times daily.             warfarin (COUMADIN) 4 MG tablet  Take 2 tablets (8 mg total) by mouth Daily.                        _________________________________  So Tyler MD  07/17/2019

## 2019-07-17 NOTE — PROGRESS NOTES
Hospital Medicine   Progress note      [place] [facility] [place] So [place] MD   Admit Date: 7/2/2019   Hospital Day: 13  DELMI: 7/17/2019   Code status: Full Code   Principal Problem: Acute on chronic combined systolic and diastolic heart failure   Per HPI:  54 y/o M with PMH combined CHF (last Echo 4/2019 with EF 23%, diastolic dysfuction, moderated MR and TR), HTN, chronic A. fib (on coumadin), A. flutter s/p ablation, CVA (in 2009), CAD, ?PE (patient reported), non-compliance, and drug seeking behavior presents c/o of right shoulder/arm pain following an injury from right leg weakness. Patient reports waking up to go to the restroom during the night approximately 4 days ago when he was unable to bare weight on right leg due to weakness. He attempted to get back in the bed and aggravated/injured his right shoulder climbing back into bed. He was hoping the pain would resolve on its own but it has persisted for past 4 days prompting him to come to ED. He reports he can move his right upper extremity but refuses to because he does not want to illicit the pain.  Pt denies fever, chills, appetite change, wt change, CP, SOB (at baseline, 2 pillow orthopnea), palpitations, increased leg swelling (chronic leg swelling and venous stasis changes), N/V/D, confusion, slurred speech, dysphagia, seizures, tremors, syncope. Through chart review, patient seen in ED for various pain complaints in the past.  In the ED: Afebrile without leukocytosis on arrival. Hypertensive at 140s-150s/80s. Hgb 8.7 (baseline). ESR elevated >120, CRP 25.9. INR non-therapeutic (1.2). EKG with A. Fib. Alk Phos 236, bili 1.8. Tox screen positive for benzos and opiates. UA unremarkable. CT head without acute intracranial abnormalities. R shoulder XR with no acute fracture. MRI brain with chronic infarcts. Cervical, lumbar, and thoracic spine MRI limited due to artifact but showed chronic changes and C5-6 level demonstrates appearance of disc osteophyte  disease mild anterior impression upon the dural sac without high-grade stenosis.  Foraminal evaluation is limited, there is suggestion of foraminal encroachment on the right.  Correlation for exiting nerve root symptomatology is needed.     Interval hx:   Pt was seen and examined at bedside. Pt had no acute events overnight, and no new complaints this morning. Pt remained hemodynamically stable and afebrile. Pt has been tolerating PO intake well .  Complaining of generalized arthralgia and myalgia    ROS (Positive in Bold, otherwise negative)  Constitutional: fever, chills, night sweats  CV: chest pain, edema, palpitations  Resp: SOB, cough, sputum production  GI: changes in appetite, NVDC, pain, melena, hematochezia, GERD, hematemesis  : Dysuria, hematuria, urinary urgency, frequency  MSK: arthralgia/myalgia, joint swelling  Neuro/Psych: anxiety, depression    PEx   Temp:  [97.9 °F (36.6 °C)-99.5 °F (37.5 °C)]   Pulse:  []   Resp:  [16-19]   BP: (108-135)/(55-82)   SpO2:  [90 %-96 %]      I & O (Last 24H):     Intake/Output Summary (Last 24 hours) at 7/17/2019 0652  Last data filed at 7/17/2019 0530  Gross per 24 hour   Intake 1501 ml   Output 2876 ml   Net -1375 ml       General:  male  in no acute distress. Nontoxic. Resting in bed. Cooperative.  HEENT: NCAT. PERRL. EOMI. Sclera Anicteric.  CVS: RRR. Normal S1 S2. No murmurs  Pulm: CTAB. Normal respiratory effort. No wheezes, rhonchi, or crackles.  Abdomen: Soft. Non-distended. No tenderness to palpation. No rebound or guarding. +BS.  Extremities: No edema. No cyanosis. Full ROM.  Neuro: Alert, oriented x 4, Spont mvt of all extremities with no focal deficits noted.    Recent Results (from the past 24 hour(s))   Uric acid    Collection Time: 07/16/19  3:50 PM   Result Value Ref Range    Uric Acid 16.6 (H) 3.4 - 7.0 mg/dL   Occult blood x 1, stool    Collection Time: 07/16/19  4:50 PM   Result Value Ref Range    Occult Blood Positive (A)  Negative   CBC auto differential    Collection Time: 07/17/19  4:49 AM   Result Value Ref Range    WBC 8.10 3.90 - 12.70 K/uL    RBC 2.89 (L) 4.60 - 6.20 M/uL    Hemoglobin 6.8 (L) 14.0 - 18.0 g/dL    Hematocrit 23.7 (L) 40.0 - 54.0 %    Mean Corpuscular Volume 82 82 - 98 fL    Mean Corpuscular Hemoglobin 23.5 (L) 27.0 - 31.0 pg    Mean Corpuscular Hemoglobin Conc 28.7 (L) 32.0 - 36.0 g/dL    RDW 17.8 (H) 11.5 - 14.5 %    Platelets 349 150 - 350 K/uL    MPV 9.7 9.2 - 12.9 fL    Immature Granulocytes 0.6 (H) 0.0 - 0.5 %    Gran # (ANC) 5.2 1.8 - 7.7 K/uL    Immature Grans (Abs) 0.05 (H) 0.00 - 0.04 K/uL    Lymph # 1.1 1.0 - 4.8 K/uL    Mono # 1.4 (H) 0.3 - 1.0 K/uL    Eos # 0.3 0.0 - 0.5 K/uL    Baso # 0.04 0.00 - 0.20 K/uL    nRBC 0 0 /100 WBC    Gran% 64.6 38.0 - 73.0 %    Lymph% 13.7 (L) 18.0 - 48.0 %    Mono% 17.3 (H) 4.0 - 15.0 %    Eosinophil% 3.3 0.0 - 8.0 %    Basophil% 0.5 0.0 - 1.9 %    Differential Method Automated    Comprehensive metabolic panel    Collection Time: 07/17/19  4:49 AM   Result Value Ref Range    Sodium 140 136 - 145 mmol/L    Potassium 3.6 3.5 - 5.1 mmol/L    Chloride 94 (L) 95 - 110 mmol/L    CO2 32 (H) 23 - 29 mmol/L    Glucose 147 (H) 70 - 110 mg/dL    BUN, Bld 72 (H) 6 - 20 mg/dL    Creatinine 1.8 (H) 0.5 - 1.4 mg/dL    Calcium 10.1 8.7 - 10.5 mg/dL    Total Protein 8.8 (H) 6.0 - 8.4 g/dL    Albumin 3.3 (L) 3.5 - 5.2 g/dL    Total Bilirubin 0.8 0.1 - 1.0 mg/dL    Alkaline Phosphatase 217 (H) 55 - 135 U/L    AST 37 10 - 40 U/L    ALT 16 10 - 44 U/L    Anion Gap 14 8 - 16 mmol/L    eGFR if African American 48.9 (A) >60 mL/min/1.73 m^2    eGFR if non  42.3 (A) >60 mL/min/1.73 m^2   Magnesium    Collection Time: 07/17/19  4:49 AM   Result Value Ref Range    Magnesium 2.1 1.6 - 2.6 mg/dL   Protime-INR    Collection Time: 07/17/19  4:49 AM   Result Value Ref Range    Prothrombin Time 19.3 (H) 9.0 - 12.5 sec    INR 2.0 (H) 0.8 - 1.2   Phosphorus    Collection Time: 07/17/19   4:49 AM   Result Value Ref Range    Phosphorus 3.5 2.7 - 4.5 mg/dL       No results for input(s): POCTGLUCOSE in the last 168 hours.    Hemoglobin A1C   Date Value Ref Range Status   07/02/2019 6.0 (H) 4.0 - 5.6 % Final     Comment:     ADA Screening Guidelines:  5.7-6.4%  Consistent with prediabetes  >or=6.5%  Consistent with diabetes  High levels of fetal hemoglobin interfere with the HbA1C  assay. Heterozygous hemoglobin variants (HbS, HgC, etc)do  not significantly interfere with this assay.   However, presence of multiple variants may affect accuracy.     10/17/2015 6.4 (H) 4.5 - 6.2 % Final   02/11/2007 6.3 (H) 4.5 - 6.2 % Final        Active Hospital Problems    Diagnosis  POA    *Acute on chronic combined systolic and diastolic heart failure [I50.43]  Yes    Acute idiopathic gout of right hand [M10.041]  Unknown    Injury of right ring finger [S69.91XA]  Unknown    Dysphagia for pills, idiopathic [R13.10]  Yes     Patient chews pills and opens capsules -- avoid sustained-release formulations.      Shoulder pain [M25.519]  Unknown    Weakness of lower extremity [R29.898]  Yes    Acute pain of right shoulder due to trauma [M25.511, G89.11]  Yes    Neuropathy of both feet [G57.93]  Yes    Prediabetes [R73.03]  Yes    Venous stasis of lower extremity [I87.8]  Yes    Impaired mobility [Z74.09]  Yes    Elevated alkaline phosphatase level [R74.8]  Yes    JEAN-PIERRE (acute kidney injury) [N17.9]  Yes    Chronic kidney disease [N18.9]  Yes     Chronic    Lumbar back pain [M54.5]  Yes    Personal history of venous thrombosis and embolism [Z86.718]  Not Applicable    Atrial fibrillation, chronic [I48.2]  Yes     Chronic    Essential hypertension [I10]  Yes     Chronic    Chronic anticoagulation [Z79.01]  Not Applicable     Chronic    Non compliance w medication regimen [Z91.14]  Not Applicable     Chronic    History of CVA (cerebrovascular accident) [Z86.73]  Not Applicable     Personal account, occurred  in 2008/2009 at [facility].        Resolved Hospital Problems    Diagnosis Date Resolved POA    Febrile illness, acute [R50.9] 07/09/2019 Yes          Assessment and Plan for problems addressed today:      aspirin  81 mg Oral Daily    ergocalciferol  50,000 Units Oral Q7 Days    furosemide  80 mg Oral BID    gabapentin  100 mg Oral BID    isosorbide-hydrALAZINE 20-37.5 mg  1 tablet Oral BID    methocarbamol  500 mg Oral BID PC    methyl salicylate-menthol 15-10%   Topical (Top) TID    metoprolol tartrate  25 mg Oral Q6H    pantoprazole  40 mg Oral Daily    polyethylene glycol  17 g Oral BID    potassium chloride 10%  40 mEq Oral Once    potassium chloride  40 mEq Oral Once    predniSONE  40 mg Oral Daily    senna-docusate 8.6-50 mg  1 tablet Oral BID    warfarin  8 mg Oral Daily     albuterol, calcium carbonate, diphenhydrAMINE, HYDROmorphone, nitroGLYCERIN, ramelteon, sodium chloride 0.9%, sodium chloride 0.9%    Acute on Chronic Combined systolic/diastolic heart failure   -Cardiology consulted, appreciate assistance. No longer following actively  -c/b cardiorenal JEAN-PIERRE  -Acute portion resolving, now back on home lasix regimen  -Repeat ECHO ordered; cancelled by Cardiology  -Converted diuresis to home oral dose 7/9 per Cardiology's recommendation  -Continuing home Lasix regimen and fluid restriction     Acute gout flare  -Elevated uric acid level noted earlier in admission and treated with colchicine with minimal symptoms  -Right right finger now with pain and swelling. Ortho following  -s/p R RF PIP arthrocentesis yesterday, cultures in process  -Started prednisone     Right sided weakness & acute pain   -IV opiates discontinued, on oral morphine PRN, changed to liquid due to history of chewing pills.  -Gabapentin renal-dose ordered, initially refusing but now taking, titrate dose as tolerated.    -etiology for pian and weakness remains unclear, s/p MRI Brain/C/T/L spine  -s/p Right shoulder  "arthrocentesis - gram stain negative, culture Aerobic with No Growth  -Neurology consulted: concern for non-pathologic pattern/etiology of pain and weakness    -Continue OT/PT; plan for SNF  -complaints of migratory muscle pain persists; continue OT/PT     Febrile Illness - resolved     Atrial Fibrillation, PAF   -Beta-blocker changed to Lopressor 25mg q6hrs.   -Avoiding sustained-release BB (Toprol) due to patient's habit of chewing pills.  - Episodes of asymptomatic NSVT and PVCs; EKG showed A-fib with PAC  - Warfarin increased to 8 mg. PharmD following  - Goal K 4-5 and Mg 2-3; he occasionally refuses despite discussion of risks     JEAN-PIERRE on CKD   - Improving  - Continue diuresis for cardiorenal syndrome per cardiolgy and nephrology.  - Work up for paraproteinemia negative.     Hx of VTE  - INR 1.9 today  - Increased warfarin to 8 mg given previous borderline INR. Suspect the current dip is from prior refusal  - If persistently subtherapeutic will bridge with lovenox     Anemia of iron deficiency  -Stable  -normal ferritin but low serum iron and saturation   -Pt declines blood product transfusion, refusal signed. "if I get sick I want to be able to [place] the hospital" - Mr. Terrell on 7/16  -repeat iron studies/retic - retic count low, serum and saturated iron very low. Oral iron supplementation started, now discontinued in favor of IV iron starting 7/16. Will trend response.   - FOBT pending though no evidence of GI bleeding. He did have one episode of epistaxis 7/14.      Discharge plan and follow up: DC home once medically stable     So Tyler MD  Hospital Medicine Staff  [phone] pager        "

## 2019-07-17 NOTE — PT/OT/SLP PROGRESS
"Physical Therapy Treatment    Patient Name:  Hamlet Terrell   MRN:  [id number]    Recommendations:     Discharge Recommendations:  nursing facility, skilled   Discharge Equipment Recommendations: shower chair, walker, rolling, commode   Barriers to discharge: Decreased caregiver support    Assessment:     Hamlet Terrell is a 53 y.o. male admitted with a medical diagnosis of Acute on chronic combined systolic and diastolic heart failure.  He presents with the following impairments/functional limitations:  weakness, gait instability, impaired endurance, impaired balance, impaired self care skills, impaired functional mobilty, decreased coordination, decreased upper extremity function, decreased lower extremity function, decreased ROM, impaired cardiopulmonary response to activity, impaired skin, edema, decreased safety awareness, pain. Pt continues to demo increased pain with pt now reporting LLE and RLE pain during gait. Increased reliance on LUE support throughout gait for assist with balance and off-loading LE. Gait distance limited due to pain, weakness, and impaired endurance. Pt would continue to benefit from skilled acute PT in order to address current deficits and progress functional mobility.     Rehab Prognosis: fair-good; patient would benefit from acute skilled PT services to address these deficits and reach maximum level of function.    Recent Surgery: * No surgery found *      Plan:     During this hospitalization, patient to be seen 3 x/week to address the identified rehab impairments via gait training, therapeutic activities, therapeutic exercises, neuromuscular re-education and progress toward the following goals:    · Plan of Care Expires:  08/03/19    Subjective     Chief Complaint: RUE and BLE pain   Patient/Family Comments/goals: "Next time I'm going to use that walker," pt reporting at end of session.  Pain/Comfort:  · Pain Rating 1: (reporting BLE pain and RUE pain; did not rate)  · Pain " Addressed 1: Reposition, Distraction, Cessation of Activity      Objective:     Communicated with RN prior to session.  Patient found up in chair with telemetry upon PT entry to room.     General Precautions: Standard, fall   Orthopedic Precautions:N/A   Braces: N/A     Functional Mobility:  · Transfers:     · Sit to Stand:  stand by assistance with no AD  · Gait: ~15 ft. with LUE HHA, initially with Becky but progressed to Becky with assist of 2 (with 2nd person needed for management/advancing RLE)  ? demo'd decreased step length (R>L), decreased jordan, increased time in double stance, no floor clearance RLE (sliding to advance), increased L lean  ? Initially performing with HHA and Becky of 1; required Becky of 2nd person towards end of gait for management of RLE in order to advance  ? Increasing LE pain as gait distance progressed, limiting further gait distance      AM-PAC 6 CLICK MOBILITY  Turning over in bed (including adjusting bedclothes, sheets and blankets)?: 3  Sitting down on and standing up from a chair with arms (e.g., wheelchair, bedside commode, etc.): 3  Moving from lying on back to sitting on the side of the bed?: 3  Moving to and from a bed to a chair (including a wheelchair)?: 3  Need to walk in hospital room?: 3  Climbing 3-5 steps with a railing?: 1  Basic Mobility Total Score: 16       Therapeutic Activities and Exercises:   Educated on the importance of participation in therapy and progression of mobility. Pt v/u.  Educated on edema reduction, including elevation of BLE and RUE. However, pt declining elevation of extremities at this time.     Patient left up in chair with all lines intact and call button in reach..    GOALS:   Multidisciplinary Problems     Physical Therapy Goals        Problem: Physical Therapy Goal    Goal Priority Disciplines Outcome Goal Variances Interventions   Physical Therapy Goal     PT, PT/OT Ongoing (interventions implemented as appropriate)     Description:  Goals  to be met by: 2019     Patient will increase functional independence with mobility by performin. Supine to sit with Set-up Isabela -not met  2. Sit to supine with Set-up Isabela -not met  3. Sit to stand transfer with Stand-by Assistance -met 7/15  3a. Sit to stand transfer with Mod-I   4. Bed to chair transfer with Stand-by Assistance using Rolling Walker -not met  5. Gait  x 30 feet with Contact Guard Assistance using Rolling Walker. -not met  6. Lower extremity exercise program x20 reps per handout, with assistance as needed -not met                         Time Tracking:     PT Received On: 19  PT Start Time: 1021     PT Stop Time: 1042  PT Total Time (min): 21 min     Billable Minutes: Therapeutic Activity 21    Treatment Type: Treatment  PT/PTA: PT     PTA Visit Number: 0     Eboni Pittman, PT, DPT   2019

## 2019-07-17 NOTE — PROGRESS NOTES
PHARMACY CONSULT NOTE: WARFARIN      Hamlet Terrell is a 53 y.o. male on warfarin therapy for Atrial Fibrillation. PharmD has been consulted for warfarin dosing.     Current order: warfarin 8mg daily    Home dose: 6 mg daily   Coumadin clinic enrollment: Active  INR goal: 2-3     Lab Results   Component Value Date    INR 2.0 (H) 07/17/2019    INR 1.9 (H) 07/16/2019    INR 2.0 (H) 07/15/2019     Significant drug interactions: Prednisone can increase INR (monitor)   Diet: Adult Cardiac     Recommendation(s):   · Warfarin 7.5 mg daily   · 7/16- INR decreased likely due to patient missing a dose on 07/14   · Pharmacy will continue to follow and monitor warfarin     Thank you for the consult,  Bina Garcias, PharmD, BCPS  [id number]      **Note: Consults are reviewed Monday-Friday 7:00am-3:30pm. THE ABOVE RECOMMENDATIONS ARE ONLY SUGGESTED.THE RECOMMENDATIONS SHOULD BE CONSIDERED IN CONJUNCTION WITH ALL PATIENT FACTORS. **

## 2019-07-17 NOTE — PLAN OF CARE
Problem: Occupational Therapy Goal  Goal: Occupational Therapy Goal  Goals to be met by: 7/22 (revised per POC 7/15)  Patient will increase functional independence with ADLs by performing:    LB dressing (brief, shorts) with set up and minimal assistance.  Grooming while standing at sink with set up and Minimal Assistance.  Toilet transfer with Minimal Assistance.  Functional mobility at household distance for ADL task with CGA.  B UE HEP for endurance mgmt with hand out and assistance as needed.      Outcome: Ongoing (interventions implemented as appropriate)  Continue POC     Esthela Faye, OTR/L  Pager: [phone]  7/17/2019

## 2019-07-17 NOTE — PLAN OF CARE
07/17/19 0919   Post-Acute Status   Post-Acute Authorization Other   Other Status No Post-Acute Service Needs

## 2019-07-17 NOTE — PT/OT/SLP PROGRESS
Occupational Therapy   Treatment    Name: Hamlet Terrell  MRN: [id number]  Admitting Diagnosis:  Acute on chronic combined systolic and diastolic heart failure       Recommendations:     Discharge Recommendations: nursing facility, skilled  Discharge Equipment Recommendations:  commode, walker, rolling, shower chair  Barriers to discharge:  Decreased caregiver support    Assessment:     Hamlet Terrell is a 53 y.o. male with a medical diagnosis of Acute on chronic combined systolic and diastolic heart failure.  He presents with performance deficits affecting function are weakness, impaired functional mobilty, gait instability, impaired endurance, decreased upper extremity function, impaired balance, decreased lower extremity function, impaired self care skills, edema, pain, impaired cardiopulmonary response to activity.     Rehab Prognosis:  Good; patient would benefit from acute skilled OT services to address these deficits and reach maximum level of function.       Plan:     Patient to be seen 3 x/week to address the above listed problems via self-care/home management, therapeutic activities, therapeutic exercises  · Plan of Care Expires: 08/07/19  · Plan of Care Reviewed with: patient    Subjective     Pain/Comfort:  · Pain Rating 1: (Pt reported R UE/LE pain but did not rate )    Objective:     Communicated with: RN prior to session.  Patient found up in chair with telemetry upon OT entry to room.    General Precautions: Standard, fall   Orthopedic Precautions:N/A   Braces: N/A     Occupational Performance:     Bed Mobility:    · Not performed, pt found sitting UIC     Functional Mobility/Transfers:  · Patient completed Sit <> Stand Transfer from bedside chair with contact guard assistance  with  rolling walker   · Functional Mobility: Pt engaging in functional mobility to simulate household distances approx 22ft  with CGA using RW and min A for R LE advancement  in order to maximize functional activity tolerance  and standing balance required for engagement in occupations of choice.   · Pt with slow pacing and increased time for mobility   · Mobility limited due to increased R shoulder pain   · Pt able to grasp handrail of RW using in a tripod grasp. 4th and 5th digit flexion improved during mobility to grasp RW slightly    Activities of Daily Living:  · Upper Body Dressing: minimum assistance to don gown while seated UIC   · Toileting: independence pt voided into urinal while sitting UIC     AMPAC 6 Click ADL: 17    Treatment & Education:  Pt educated by therapist on:   - Pt educated on role of OT, POC, and goals for therapy.    - Educated pt on being appropriate to transfer with nsg and PCT with x 1 person assisting using RW  - Time provided for therapeutic counseling and discussion of health disposition.   Extensive therapeutic listening provided of pt's concerns and frustrations during therapy session.   - Importance of OOB ax's with staff member assistance and sitting OOB majority of day.   - Pt completed ADLs and functional mobility for treatment session as noted above   - Pt verbalized understanding. Pt expressed no further concerns/questions.  - whiteboard updated     Patient left up in chair with all lines intact, call button in reach, RN notified    GOALS:   Multidisciplinary Problems     Occupational Therapy Goals        Problem: Occupational Therapy Goal    Goal Priority Disciplines Outcome Interventions   Occupational Therapy Goal     OT, PT/OT Ongoing (interventions implemented as appropriate)    Description:  Goals to be met by: 7/22 (revised per POC 7/15)  Patient will increase functional independence with ADLs by performing:    LB dressing (brief, shorts) with set up and minimal assistance.  Grooming while standing at sink with set up and Minimal Assistance.  Toilet transfer with Minimal Assistance.  Functional mobility at household distance for ADL task with CGA.  B UE HEP for endurance mgmt with hand out  and assistance as needed.                       Time Tracking:     OT Date of Treatment: 07/17/19  OT Start Time: 1507  OT Stop Time: 1534  OT Total Time (min): 27 min    Billable Minutes:Therapeutic Activity 27    Esthela Faye, OT  7/17/2019

## 2019-07-17 NOTE — PLAN OF CARE
Problem: Adult Inpatient Plan of Care  Goal: Plan of Care Review  Outcome: Ongoing (interventions implemented as appropriate)  Pt free of falls/trauma/injuries.  Denies c/o SOB, CP, or discomfort.  Generalized skin remains CDI; moderated generalized  edema noted.  Pt conitnues being diuresed with lasix 80mg po ; diuresing well.Pt continues to c/o pain to left shoulder and is receiving   hydromorphine 1mg/ml for pain to help with the pain. Pt continues to have swelling to right hand  Instructed to elevate to help with swelling.Neurology consulted and is following pt.Pt was to discharge to [facility] awaiting transportation orders and final reconciliation.  Pt tolerating plan of care

## 2019-07-17 NOTE — DISCHARGE SUMMARY
Discharge Summary  Hospital Medicine       Name: Hamlet Terrell  YOB: 1965 (Age: 53 y.o.)  Date of Admission: 7/2/2019  Date of Discharge: 7/17/2019  Attending Provider on Discharge: So Tyler MD  [facility] Medicine Team: [facility] HOSP MED D  Code status: Full Code    Primary Care Provider: Carlin Swift MD    Discharge Diagnosis:  Active Hospital Problems    Diagnosis  POA    *Acute on chronic combined systolic and diastolic heart failure [I50.43]  Yes    Acute idiopathic gout of right hand [M10.041]  Unknown    Injury of right ring finger [S69.91XA]  Unknown    Dysphagia for pills, idiopathic [R13.10]  Yes     Patient chews pills and opens capsules -- avoid sustained-release formulations.      Shoulder pain [M25.519]  Unknown    Weakness of lower extremity [R29.898]  Yes    Acute pain of right shoulder due to trauma [M25.511, G89.11]  Yes    Neuropathy of both feet [G57.93]  Yes    Prediabetes [R73.03]  Yes    Venous stasis of lower extremity [I87.8]  Yes    Impaired mobility [Z74.09]  Yes    Elevated alkaline phosphatase level [R74.8]  Yes    JEAN-PIERRE (acute kidney injury) [N17.9]  Yes    Chronic kidney disease [N18.9]  Yes     Chronic    Lumbar back pain [M54.5]  Yes    Personal history of venous thrombosis and embolism [Z86.718]  Not Applicable    Atrial fibrillation, chronic [I48.2]  Yes     Chronic    Essential hypertension [I10]  Yes     Chronic    Chronic anticoagulation [Z79.01]  Not Applicable     Chronic    Non compliance w medication regimen [Z91.14]  Not Applicable     Chronic    History of CVA (cerebrovascular accident) [Z86.73]  Not Applicable     Personal account, occurred in 2008/2009 at [facility].        Resolved Hospital Problems    Diagnosis Date Resolved POA    Febrile illness, acute [R50.9] 07/09/2019 Yes       HPI:  54 y/o M with PMH combined CHF (last Echo 4/2019 with EF 23%, diastolic dysfuction, moderated MR and TR), HTN, chronic A. fib (on  coumadin), A. flutter s/p ablation, CVA (in 2009), CAD, ?PE (patient reported), non-compliance, and drug seeking behavior presents c/o of right shoulder/arm pain following an injury from right leg weakness. Patient reports waking up to go to the restroom during the night approximately 4 days ago when he was unable to bare weight on right leg due to weakness. He attempted to get back in the bed and aggravated/injured his right shoulder climbing back into bed. He was hoping the pain would resolve on its own but it has persisted for past 4 days prompting him to come to ED. He reports he can move his right upper extremity but refuses to because he does not want to illicit the pain.  Pt denies fever, chills, appetite change, wt change, CP, SOB (at baseline, 2 pillow orthopnea), palpitations, increased leg swelling (chronic leg swelling and venous stasis changes), N/V/D, confusion, slurred speech, dysphagia, seizures, tremors, syncope. Through chart review, patient seen in ED for various pain complaints in the past.  In the ED: Afebrile without leukocytosis on arrival. Hypertensive at 140s-150s/80s. Hgb 8.7 (baseline). ESR elevated >120, CRP 25.9. INR non-therapeutic (1.2). EKG with A. Fib. Alk Phos 236, bili 1.8. Tox screen positive for benzos and opiates. UA unremarkable. CT head without acute intracranial abnormalities. R shoulder XR with no acute fracture. MRI brain with chronic infarcts. Cervical, lumbar, and thoracic spine MRI limited due to artifact but showed chronic changes and C5-6 level demonstrates appearance of disc osteophyte disease mild anterior impression upon the dural sac without high-grade stenosis.  Foraminal evaluation is limited, there is suggestion of foraminal encroachment on the right.  Correlation for exiting nerve root symptomatology is needed.     Hospital Course:  54 yo M with combined CHF (Echo 4/2019 with EF 23%, diastolic dysfuction, moderated MR and TR), HTN, chronic A-fib (on Coumadin),  A-flutter s/p ablation, CVA (in 2009), CAD, ?PE (patient reported), non-compliance presenting with musculoskeletal complaints and weakness admitted for acute on chronic combined heart failure and JEAN-PIERRE.     Acute on Chronic Combined systolic/diastolic heart failure   -Cardiology consulted, appreciate assistance. No longer following actively  -c/b cardiorenal JEAN-PIERRE  -Acute portion resolving, now back on home lasix regimen  -Repeat ECHO ordered; cancelled by Cardiology  -Converted diuresis to oral dose (Lasix 80 mg p.o. b.i.d.) 7/9 per Cardiology's recommendation  -Continuing home Lasix regimen and fluid restriction     Acute gout flare  -Elevated uric acid level noted earlier in admission and treated with colchicine with minimal symptoms  -Right right finger now with pain and swelling. Was followed by Orthopedics  -s/p R RF PIP arthrocentesis on 07/16/2019, cultures in process but Gram stain negative.  aerobic bacterial cultures with no growth  -Started prednisone 40 mg p.o. daily for 10 day course  -ambulatory referral to Orthopedics     Right sided weakness & acute pain   -IV opiates discontinued, on oral morphine PRN, changed to liquid due to history of chewing pills.  -Gabapentin renal-dose ordered, initially refusing but now taking, titrate dose as tolerated.    -etiology for pian and weakness remains unclear, s/p MRI Brain/C/T/L spine  -s/p Right shoulder arthrocentesis - gram stain negative, culture Aerobic with No Growth  -Neurology consulted: concern for non-pathologic pattern/etiology of pain and weakness    -Continue OT/PT; plan for rehab  -complaints of migratory muscle pain persists; continue OT/PT     Febrile Illness - resolved     Atrial Fibrillation, PAF   -Beta-blocker changed to Lopressor 25mg q6hrs.   -Avoiding sustained-release BB (Toprol) due to patient's habit of chewing pills.  - Episodes of asymptomatic NSVT and PVCs; EKG showed A-fib with PAC  - Warfarin increased to 8 mg per pharmacy recs  -  "Goal K 4-5 and Mg 2-3; he occasionally refuses despite discussion of risks     JEAN-PIERRE on CKD   - Improving  - Continue diuresis for cardiorenal syndrome per cardiolgy and nephrology.  - Work up for paraproteinemia negative.  -PTA losartan.     Hx of VTE  - INR 2.0 today  - Increased warfarin to 8 mg given previous borderline INR. Suspect the current dip is from prior refusal  - If persistently subtherapeutic will bridge with lovenox     Anemia of iron deficiency  -Stable  -normal ferritin but low serum iron and saturation   -Pt declines blood product transfusion, refusal signed. "if I get sick I want to be able to [place] the hospital" - Mr. Terrell on 7/16  -repeat iron studies/retic - retic count low, serum and saturated iron very low.  Given IV iron this admission.  Continue p.o. iron supplementation for now.  May need further IV iron supplementation in the future.    GI bleeding / fecal occult positive test  -patient with chronic iron deficiency anemia, now noted to have FOBT positive  -patient refusing colonoscopy or blood transfusions despite counseling  -well place outpatient referral for colonoscopy, and urge patient to undergo outpatient colonoscopy.  -H/H remains low but stable.  No acute hematochezia, bright red blood per rectum, hematemesis.  Patient reports he is having brown stools.  -will recommend spacing out blood draws, use pediatric tubes for blood draws only.        Labs:  Recent Labs   Lab 07/15/19  0459 07/16/19  0423 07/17/19  0449   WBC 8.52 8.07 8.10   HGB 6.7* 6.7* 6.8*   HCT 22.7* 22.2* 23.7*    331 349     Recent Labs   Lab 07/15/19  0459 07/16/19  0420 07/17/19  0449    140 140   K 3.8 3.5 3.6   CL 92* 92* 94*   CO2 29 31* 32*   BUN 84* 76* 72*   CREATININE 1.8* 1.9* 1.8*    143* 147*   CALCIUM 10.6* 10.0 10.1   MG 2.3 2.1 2.1   PHOS 3.4 3.4 3.5     Recent Labs   Lab 07/15/19  0459 07/16/19  0420 07/17/19 0449   ALKPHOS 198* 199* 217*   ALT 11 12 16   AST 23 27 37   ALBUMIN " 3.2* 3.3* 3.3*   PROT 9.0* 8.6* 8.8*   BILITOT 0.7 0.7 0.8   INR 2.0* 1.9* 2.0*      No results for input(s): POCTGLUCOSE in the last 168 hours.  No results for input(s): CPK, CPKMB, MB, TROPONINI in the last 72 hours.    ROS (Positive in Bold, otherwise negative)  Constitutional: fever, chills, night sweats  CV: chest pain, edema, palpitations  Resp: SOB, cough, sputum production  GI: changes in appetite, NVDC, pain, melena, hematochezia, GERD, hematemesis  : Dysuria, hematuria, urinary urgency, frequency  MSK: arthralgia/myalgia, joint swelling  Neuro/Psych: anxiety, depression    PEx   Temp:  [97.9 °F (36.6 °C)-99.5 °F (37.5 °C)]   Pulse:  []   Resp:  [16-19]   BP: (108-135)/(55-76)   SpO2:  [93 %-95 %]      General: no distress   Lungs: clear to ausculation anteriorly and posteriorly   Heart: regular rate and rhythm   Abdomen: normal bowel sounds, soft, no tenderness   Extremities: no edema. No clubbing or cyanosis     Consultants:  Orthopedics, Neurology, OT/PT, Nephrology, Cardiology, PMR    Current Discharge Medication List      START taking these medications    Details   ergocalciferol (ERGOCALCIFEROL) 50,000 unit Cap Take 1 capsule (50,000 Units total) by mouth every 7 days. for 10 days  Qty: 1 capsule, Refills: 0      gabapentin (NEURONTIN) 250 mg/5 mL (5 mL) solution Take 2 mLs (100 mg total) by mouth 2 (two) times daily.  Qty: 150 mL, Refills: 0      HYDROmorphone 1 mg/mL Liqd Take 0.5 mLs (0.5 mg total) by mouth every 4 (four) hours as needed.  Qty: 473 mL, Refills: 0      isosorbide-hydrALAZINE 20-37.5 mg (BIDIL) 20-37.5 mg Tab Take 1 tablet by mouth 2 (two) times daily.  Qty: 60 tablet, Refills: 11      methocarbamol (ROBAXIN) 500 MG Tab Take 1 tablet (500 mg total) by mouth 2 times daily 2 hours after meal. for 10 days  Qty: 20 tablet, Refills: 0      methyl salicylate-menthol 15-10% 15-10 % Crea Apply topically 3 (three) times daily.  Refills: 0      metoprolol tartrate (LOPRESSOR) 25 MG  tablet Take 1 tablet (25 mg total) by mouth every 6 (six) hours.  Qty: 120 tablet, Refills: 11      polyethylene glycol (GLYCOLAX) 17 gram PwPk Take 17 g by mouth 2 (two) times daily.  Qty: 30 each, Refills: 0      predniSONE (DELTASONE) 20 MG tablet Take 2 tablets (40 mg total) by mouth once daily. for 9 days  Qty: 18 tablet, Refills: 0      ramelteon (ROZEREM) 8 mg tablet Take 1 tablet (8 mg total) by mouth nightly as needed for Insomnia.  Qty: 30 tablet, Refills: 0      senna-docusate 8.6-50 mg (PERICOLACE) 8.6-50 mg per tablet Take 1 tablet by mouth 2 (two) times daily.         CONTINUE these medications which have CHANGED    Details   furosemide (LASIX) 80 MG tablet Take 1 tablet (80 mg total) by mouth 2 (two) times daily.  Qty: 60 tablet, Refills: 11      warfarin (COUMADIN) 4 MG tablet Take 2 tablets (8 mg total) by mouth Daily.  Qty: 60 tablet, Refills: 11         CONTINUE these medications which have NOT CHANGED    Details   albuterol (PROVENTIL/VENTOLIN HFA) 90 mcg/actuation inhaler Inhale 1-2 puffs into the lungs every 6 (six) hours as needed for Wheezing. Rescue  Qty: 1 Inhaler, Refills: 0      aspirin (ECOTRIN) 81 MG EC tablet Take 81 mg by mouth once daily.      calcium carbonate (TUMS) 200 mg calcium (500 mg) chewable tablet Take 1 tablet (500 mg total) by mouth 3 (three) times daily as needed.      ferrous sulfate 325 (65 FE) MG EC tablet Take 1 tablet (325 mg total) by mouth once daily.  Refills: 0      nitroGLYCERIN (NITROSTAT) 0.4 MG SL tablet Place 1 tablet (0.4 mg total) under the tongue every 5 (five) minutes as needed for Chest pain. Tablet, Sublingual Sublingual  Qty: 30 tablet, Refills: 11      pantoprazole (PROTONIX) 40 MG tablet Take 1 tablet (40 mg total) by mouth once daily.  Qty: 30 tablet, Refills: 11         STOP taking these medications       HYDROcodone-acetaminophen (NORCO) 5-325 mg per tablet Comments:   Reason for Stopping:         losartan (COZAAR) 25 MG tablet Comments:    Reason for Stopping:         metoprolol succinate (TOPROL-XL) 25 MG 24 hr tablet Comments:   Reason for Stopping:               The relevant and important risks, side effects, and benefits of their medications were reviewed with patient during hospitalization and at discharge. The patient was given the opportunity to discuss and ask questions about their medications, including target symptoms, potential risks, side effects and benefits of their medications, as well as their expected prognosis if non-medication treatment options were chosen.  The patient expresses understanding of all these options and information and voluntarily consents to treatment.    Discharge Diet:cardiac diet with Fluid restriction 1500 per day    Activity: activity as tolerated    Discharge Condition: Fair    Disposition: Rehab Facility    Tests pending at the time of discharge:  Final cultures from arthrocentesis     Time spent  on the discharge of the patient including review of hospital course with the patient. reviewing discharge medications and arranging follow-up care: 37 mins    Discharge examination Patient was seen and examined on the date of discharge and determined to be suitable for discharge.    Discharge plan and follow up:  It is critical that you make your follow-up appointment(s). If you are discharged on the weekend or after business hours, or if we are unable to schedule these appointments for you for any reason, you or a family member need to call during the next business day to schedule your appointment(s).    -Follow up with you PCP, Carlin Swift MD within 1-2 weeks as arranged with SW and treatment team prior to discharge  -Take all medications as prescribed and listed above.  -Recommended Follow-up Tests:  Colonoscopy     - Please return to ED or call your physician if you have:         1. Fevers > 101.5 unresponsive to tylenol.       2. Abdominal pain and/or distention       3. Intractable nausea, vomiting or  diarrhea       4. Inability to tolerate adequate oral intake of food       5. Neurologic changes, chest pain or shortness of breath          Follow-up with PCP, Cardiology, Orthopedics    So Tyler MD  Hospital Medicine Staff  [phone] pager

## 2019-07-17 NOTE — PLAN OF CARE
Patient awake & alert sitting in recliner when CM rounded. CM questioned patient if he would be interested in out pt rehab. Patient stated that he is agreeable to any type of therapy that his insurance will pay for. Will continue to follow.

## 2019-07-17 NOTE — PLAN OF CARE
Miguel informed by Edna with [facility] that Pt's insurance did approve inpatient rehab if Pt is agreeable. Staff informed, Sw to visit with Pt and find out if Pt is interested in placement. Pt agreeable after he speaks with his cousin, CM aware, Sw informed staff, Sw to follow. Miguel informed by Edna with [facility] needs more therapy notes to be sure Pt can work with rehab level of care, Sw to follow. Edna with [facility] is waiting on physician approval to be sure Pt can d/c to rehab today, Sw to follow.

## 2019-07-17 NOTE — SUBJECTIVE & OBJECTIVE
Interval History 7/17/2019:  Patient is seen for follow-up rehab evaluation and recommendations: Occult stool positive. No therapy yesterday. H&H low.     HPI, Past Medical, Family, and Social History remains the same as documented in the initial encounter.    Scheduled Medications:    aspirin  81 mg Oral Daily    ergocalciferol  50,000 Units Oral Q7 Days    furosemide  80 mg Oral BID    gabapentin  100 mg Oral BID    isosorbide-hydrALAZINE 20-37.5 mg  1 tablet Oral BID    methocarbamol  500 mg Oral BID PC    methyl salicylate-menthol 15-10%   Topical (Top) TID    metoprolol tartrate  25 mg Oral Q6H    pantoprazole  40 mg Oral Daily    polyethylene glycol  17 g Oral BID    potassium chloride 10%  40 mEq Oral Once    potassium chloride  40 mEq Oral Once    predniSONE  40 mg Oral Daily    senna-docusate 8.6-50 mg  1 tablet Oral BID    warfarin  8 mg Oral Daily       Diagnostic Results:   Labs: Reviewed    PRN Medications: albuterol, calcium carbonate, diphenhydrAMINE, HYDROmorphone, nitroGLYCERIN, ramelteon, sodium chloride 0.9%, sodium chloride 0.9%    Review of Systems   Constitutional: Positive for activity change. Negative for fatigue and fever.        R shoulder pain and R leg numbness and decreased mobility    HENT: Negative for trouble swallowing and voice change.    Respiratory: Negative for cough and shortness of breath.    Cardiovascular: Negative for chest pain and leg swelling.   Gastrointestinal: Negative for abdominal distention and abdominal pain.   Genitourinary: Negative for difficulty urinating and flank pain.   Musculoskeletal: Positive for gait problem. Negative for back pain.   Skin: Negative for color change and rash.   Neurological: Positive for weakness. Negative for speech difficulty and numbness.   Psychiatric/Behavioral: Negative for agitation and confusion.     Objective:     Vital Signs (Most Recent):  Temp: 98.6 °F (37 °C) (07/17/19 0933)  Pulse: 87 (07/17/19 0933)  Resp:  18 (07/17/19 0933)  BP: 120/67 (07/17/19 0933)  SpO2: 95 % (07/17/19 0933)    Vital Signs (24h Range):  Temp:  [97.9 °F (36.6 °C)-99.5 °F (37.5 °C)] 98.6 °F (37 °C)  Pulse:  [] 87  Resp:  [16-19] 18  SpO2:  [93 %-96 %] 95 %  BP: (108-135)/(55-71) 120/67     Physical Exam   Constitutional: He is oriented to person, place, and time. He appears well-developed and well-nourished.   HENT:   Head: Normocephalic and atraumatic.   Eyes: Pupils are equal, round, and reactive to light. EOM are normal.   Neck: Neck supple.   Pulmonary/Chest: Effort normal. No respiratory distress.   Musculoskeletal: He exhibits edema (BLE and R hand).   - RUE and RLE painful upon movement, decreased muscle strength    Neurological: He is alert and oriented to person, place, and time. A sensory deficit (RLE) is present.   Skin: Skin is warm and dry.   Psychiatric: He has a normal mood and affect. His behavior is normal. Thought content normal.   Nursing note and vitals reviewed.    NEUROLOGICAL EXAMINATION:     MENTAL STATUS   Oriented to person, place, and time.     CRANIAL NERVES     CN III, IV, VI   Pupils are equal, round, and reactive to light.  Extraocular motions are normal.

## 2019-07-18 VITALS
DIASTOLIC BLOOD PRESSURE: 66 MMHG | WEIGHT: 238.56 LBS | OXYGEN SATURATION: 96 % | HEART RATE: 76 BPM | SYSTOLIC BLOOD PRESSURE: 133 MMHG | TEMPERATURE: 98 F | BODY MASS INDEX: 35.33 KG/M2 | RESPIRATION RATE: 16 BRPM | HEIGHT: 69 IN

## 2019-07-18 LAB
ALBUMIN SERPL BCP-MCNC: 3.1 G/DL (ref 3.5–5.2)
ALP SERPL-CCNC: 216 U/L (ref 55–135)
ALT SERPL W/O P-5'-P-CCNC: 15 U/L (ref 10–44)
ANION GAP SERPL CALC-SCNC: 12 MMOL/L (ref 8–16)
AST SERPL-CCNC: 36 U/L (ref 10–40)
BACTERIA SPEC AEROBE CULT: NO GROWTH
BASOPHILS # BLD AUTO: 0.03 K/UL (ref 0–0.2)
BASOPHILS NFR BLD: 0.3 % (ref 0–1.9)
BILIRUB SERPL-MCNC: 0.7 MG/DL (ref 0.1–1)
BUN SERPL-MCNC: 63 MG/DL (ref 6–20)
CALCIUM SERPL-MCNC: 10 MG/DL (ref 8.7–10.5)
CHLORIDE SERPL-SCNC: 99 MMOL/L (ref 95–110)
CO2 SERPL-SCNC: 31 MMOL/L (ref 23–29)
CREAT SERPL-MCNC: 1.7 MG/DL (ref 0.5–1.4)
DIFFERENTIAL METHOD: ABNORMAL
EOSINOPHIL # BLD AUTO: 0.1 K/UL (ref 0–0.5)
EOSINOPHIL NFR BLD: 0.5 % (ref 0–8)
ERYTHROCYTE [DISTWIDTH] IN BLOOD BY AUTOMATED COUNT: 18 % (ref 11.5–14.5)
EST. GFR  (AFRICAN AMERICAN): 52.4 ML/MIN/1.73 M^2
EST. GFR  (NON AFRICAN AMERICAN): 45.4 ML/MIN/1.73 M^2
GLUCOSE SERPL-MCNC: 118 MG/DL (ref 70–110)
HCT VFR BLD AUTO: 22.1 % (ref 40–54)
HGB BLD-MCNC: 6.4 G/DL (ref 14–18)
IMM GRANULOCYTES # BLD AUTO: 0.05 K/UL (ref 0–0.04)
IMM GRANULOCYTES NFR BLD AUTO: 0.5 % (ref 0–0.5)
INR PPP: 2 (ref 0.8–1.2)
LYMPHOCYTES # BLD AUTO: 1.1 K/UL (ref 1–4.8)
LYMPHOCYTES NFR BLD: 10.3 % (ref 18–48)
MAGNESIUM SERPL-MCNC: 2.3 MG/DL (ref 1.6–2.6)
MCH RBC QN AUTO: 23.9 PG (ref 27–31)
MCHC RBC AUTO-ENTMCNC: 29 G/DL (ref 32–36)
MCV RBC AUTO: 83 FL (ref 82–98)
MONOCYTES # BLD AUTO: 1.7 K/UL (ref 0.3–1)
MONOCYTES NFR BLD: 16.4 % (ref 4–15)
NEUTROPHILS # BLD AUTO: 7.4 K/UL (ref 1.8–7.7)
NEUTROPHILS NFR BLD: 72 % (ref 38–73)
NRBC BLD-RTO: 0 /100 WBC
PHOSPHATE SERPL-MCNC: 2.2 MG/DL (ref 2.7–4.5)
PLATELET # BLD AUTO: 334 K/UL (ref 150–350)
PMV BLD AUTO: 9.4 FL (ref 9.2–12.9)
POTASSIUM SERPL-SCNC: 3.9 MMOL/L (ref 3.5–5.1)
PROT SERPL-MCNC: 8.5 G/DL (ref 6–8.4)
PROTHROMBIN TIME: 19.6 SEC (ref 9–12.5)
RBC # BLD AUTO: 2.68 M/UL (ref 4.6–6.2)
SODIUM SERPL-SCNC: 142 MMOL/L (ref 136–145)
WBC # BLD AUTO: 10.32 K/UL (ref 3.9–12.7)

## 2019-07-18 PROCEDURE — 63600175 PHARM REV CODE 636 W HCPCS: Performed by: INTERNAL MEDICINE

## 2019-07-18 PROCEDURE — 84100 ASSAY OF PHOSPHORUS: CPT

## 2019-07-18 PROCEDURE — 25000003 PHARM REV CODE 250: Performed by: HOSPITALIST

## 2019-07-18 PROCEDURE — 99232 PR SUBSEQUENT HOSPITAL CARE,LEVL II: ICD-10-PCS | Mod: ,,, | Performed by: NURSE PRACTITIONER

## 2019-07-18 PROCEDURE — 85610 PROTHROMBIN TIME: CPT

## 2019-07-18 PROCEDURE — 83735 ASSAY OF MAGNESIUM: CPT

## 2019-07-18 PROCEDURE — 99239 PR HOSPITAL DISCHARGE DAY,>30 MIN: ICD-10-PCS | Mod: ,,, | Performed by: HOSPITALIST

## 2019-07-18 PROCEDURE — 25000003 PHARM REV CODE 250: Performed by: INTERNAL MEDICINE

## 2019-07-18 PROCEDURE — 36415 COLL VENOUS BLD VENIPUNCTURE: CPT

## 2019-07-18 PROCEDURE — 99223 PR INITIAL HOSPITAL CARE,LEVL III: ICD-10-PCS | Mod: ,,, | Performed by: PHYSICAL MEDICINE & REHABILITATION

## 2019-07-18 PROCEDURE — 25000003 PHARM REV CODE 250: Performed by: STUDENT IN AN ORGANIZED HEALTH CARE EDUCATION/TRAINING PROGRAM

## 2019-07-18 PROCEDURE — 99232 SBSQ HOSP IP/OBS MODERATE 35: CPT | Mod: ,,, | Performed by: NURSE PRACTITIONER

## 2019-07-18 PROCEDURE — 25000003 PHARM REV CODE 250: Performed by: PHYSICIAN ASSISTANT

## 2019-07-18 PROCEDURE — 99223 1ST HOSP IP/OBS HIGH 75: CPT | Mod: ,,, | Performed by: PHYSICAL MEDICINE & REHABILITATION

## 2019-07-18 PROCEDURE — 85025 COMPLETE CBC W/AUTO DIFF WBC: CPT

## 2019-07-18 PROCEDURE — 80053 COMPREHEN METABOLIC PANEL: CPT

## 2019-07-18 PROCEDURE — 99239 HOSP IP/OBS DSCHRG MGMT >30: CPT | Mod: ,,, | Performed by: HOSPITALIST

## 2019-07-18 RX ADMIN — PREDNISONE 40 MG: 20 TABLET ORAL at 09:07

## 2019-07-18 RX ADMIN — FUROSEMIDE 80 MG: 80 TABLET ORAL at 09:07

## 2019-07-18 RX ADMIN — PANTOPRAZOLE SODIUM 40 MG: 40 TABLET, DELAYED RELEASE ORAL at 09:07

## 2019-07-18 RX ADMIN — METHOCARBAMOL TABLETS 500 MG: 500 TABLET, COATED ORAL at 09:07

## 2019-07-18 RX ADMIN — HYDRALAZINE HYDROCHLORIDE AND ISOSORBIDE DINITRATE 1 TABLET: 37.5; 2 TABLET, FILM COATED ORAL at 09:07

## 2019-07-18 RX ADMIN — GABAPENTIN 100 MG: 250 SOLUTION ORAL at 09:07

## 2019-07-18 RX ADMIN — ASPIRIN 81 MG: 81 TABLET, COATED ORAL at 09:07

## 2019-07-18 RX ADMIN — METOPROLOL TARTRATE 25 MG: 25 TABLET, FILM COATED ORAL at 11:07

## 2019-07-18 RX ADMIN — METOPROLOL TARTRATE 25 MG: 25 TABLET, FILM COATED ORAL at 05:07

## 2019-07-18 RX ADMIN — MENTHOL, METHYL SALICYLATE: 10; 15 CREAM TOPICAL at 09:07

## 2019-07-18 NOTE — PROGRESS NOTES
[facility]  Physical Medicine & Rehab  Progress Note    Patient Name: Hamlet Terrell  MRN: [id number]  Admission Date: 7/2/2019  Length of Stay: 14 days  Attending Physician: So Tyler MD    Subjective:     Principal Problem: Acute on chronic combined systolic and diastolic heart failure    Hospital Course:   7/8/19: Participated w/ PT & OT. Sit tos tand modA w/ RW. Ambulated 3 ft CGA- TotalA w/ RW. Grooming CGA. LBD totalA.   7/9/19: Participated w/ PT. Sit to stand modA w/ RW.   07/11/2019: Sit to stand Min x 2 ppl -CGA.  Ambulated 6 ft x 2 trials MaxA.  UBD and LBD [place].  7/12/19: Patient refused OT & PT 2/2 R hand edema.   7/15/19: Participated w/ PT & OT. Sit to stand SBA. Ambulated 17 ft HHA- [place] x 2 ppl. Feeding mod(I). Pt declined UB dressing and grooming in bathroom.   7/16/19: No PT or OT  7/17/19:  Participated w/ PT & OT. Sit to stand CGA w/ RW. Ambulated 37 ft CGA- [place] x 2w/ RW. UBD [place]. Toileting (I).      Interval History 7/18/2019:  Patient is seen for follow-up rehab evaluation and recommendations: Participated with therapy yesterday. Refusing colonoscopy at this time.     HPI, Past Medical, Family, and Social History remains the same as documented in the initial encounter.    Scheduled Medications:    aspirin  81 mg Oral Daily    ergocalciferol  50,000 Units Oral Q7 Days    furosemide  80 mg Oral BID    gabapentin  100 mg Oral BID    isosorbide-hydrALAZINE 20-37.5 mg  1 tablet Oral BID    methocarbamol  500 mg Oral BID PC    methyl salicylate-menthol 15-10%   Topical (Top) TID    metoprolol tartrate  25 mg Oral Q6H    pantoprazole  40 mg Oral Daily    polyethylene glycol  17 g Oral BID    potassium chloride 10%  40 mEq Oral Once    potassium chloride  40 mEq Oral Once    predniSONE  40 mg Oral Daily    senna-docusate 8.6-50 mg  1 tablet Oral BID    warfarin  8 mg Oral Daily       Diagnostic Results:   Labs: Reviewed    PRN Medications: albuterol, calcium  carbonate, diphenhydrAMINE, HYDROmorphone, nitroGLYCERIN, ramelteon, sodium chloride 0.9%, sodium chloride 0.9%    Review of Systems   Constitutional: Positive for activity change. Negative for fatigue and fever.        R shoulder pain and R leg numbness and decreased mobility    HENT: Negative for trouble swallowing and voice change.    Respiratory: Negative for cough and shortness of breath.    Cardiovascular: Negative for chest pain and leg swelling.   Gastrointestinal: Negative for abdominal distention and abdominal pain.   Genitourinary: Negative for difficulty urinating and flank pain.   Musculoskeletal: Positive for gait problem. Negative for back pain.   Skin: Negative for color change and rash.   Neurological: Positive for weakness. Negative for speech difficulty and numbness.   Psychiatric/Behavioral: Negative for agitation and confusion.     Objective:     Vital Signs (Most Recent):  Temp: 98.2 °F (36.8 °C) (07/18/19 0800)  Pulse: 76 (07/18/19 0800)  Resp: 16 (07/18/19 0800)  BP: (!) 140/70 (07/18/19 0800)  SpO2: 96 % (07/18/19 0800)    Vital Signs (24h Range):  Temp:  [97.4 °F (36.3 °C)-99.1 °F (37.3 °C)] 98.2 °F (36.8 °C)  Pulse:  [] 76  Resp:  [16-18] 16  SpO2:  [93 %-96 %] 96 %  BP: (120-148)/(57-76) 140/70     Physical Exam   Constitutional: He is oriented to person, place, and time. He appears well-developed and well-nourished.   HENT:   Head: Normocephalic and atraumatic.   Eyes: Pupils are equal, round, and reactive to light. EOM are normal.   Neck: Neck supple.   Pulmonary/Chest: Effort normal. No respiratory distress.   Musculoskeletal: He exhibits edema (R hand improving).   - RUE painful upon movement, decreased muscle strength    - RLE swelling present, mild wiggle of toes upon exam. Not able to  off floor at this time.   Neurological: He is alert and oriented to person, place, and time. A sensory deficit (RLE) is present.   Skin: Skin is warm and dry.   Psychiatric: He has a  normal mood and affect. His behavior is normal. Thought content normal.   Nursing note and vitals reviewed.    Assessment/Plan:      * Acute on chronic combined systolic and diastolic heart failure  - Lasix continue home dose per Cards    Acute pain of right shoulder due to trauma  - s/p Right shoulder arthrocentesis - gram stain negative, culture Aerobic with No Growth.    Impaired mobility  - Related to prolonged/acute hospital course.     Recommendations  -  Encourage mobility, OOB in chair at least 3 hours per day, and early ambulation as appropriate  -  PT/OT evaluate and treat  -  Pain management  -  Monitor for and prevent skin breakdown and pressure ulcers  · Early mobility, repositioning/weight shifting every 20-30 minutes when sitting, turn patient every 2 hours, proper mattress/overlay and chair cushioning, pressure relief/heel protector boots  -  DVT prophylaxis    -  Reviewed discharge options (IP rehab, SNF, HH therapy, and OP therapy)    JEAN-PIERRE (acute kidney injury)  - nephrology was consulted and recommending increase Lasix and avoid nephrotoxic meds    Personal history of venous thrombosis and embolism  - Coumadin    Atrial fibrillation, chronic  - Coumadin    Recommend Inpatient Rehab. Insurance approved.     Mary Buitrago NP  Department of Physical Medicine & Rehab   [facility]

## 2019-07-18 NOTE — NURSING
"Informed by GILBERT Jones, , that patient transportation is set up for 1400 hours and to review his note to call report to receiving facility.  Called [facility] @ [phone] to call report, transferred to the 3rd floor, call disconnected.  Called again, recording stated, "Call cannot be completed at this time," verified number with , and called for a 3rd time, call disconnected.    "

## 2019-07-18 NOTE — NURSING
"Discussed with patient morning medication administration.  Patient stated it was okay to prepare medications.  Prepared morning medications.  Patient stated, "Put them over there I will take them later."  Patient also stated that Facebook was crazy.  He reportedly posted a comment that caused his family to respond if everything is okay.  Patient stated, "I posted that I want to disappear and everyone thinks I want to kill myself."  Patient denied suicidal ideation.  Patient stated, "Sometimes I just want to be left alone and be alone."  Bedside offered counseling or other services to assist with feelings of isolation.  Patient refused.  "

## 2019-07-18 NOTE — PLAN OF CARE
Problem: Adult Inpatient Plan of Care  Goal: Plan of Care Review  Pt free of fall, injuries, and trauma. Skin clean and dry. Skin tear located to lower back and bilateral lower extremities, healing well. Pt educated on importance of fluid restriction. Pt refusing to restrict fluids. Pain for right hand is being managed with oral dilaudid q 1 mg prn. Pt still being diuresed with PO lasix 80 mg bid. Reviewed plan of care with pt. Pt verbalized understanding. Pt is AAO X 3. VSS. NADN. Will continue to monitor. Plan for discharge to rehab in AM.

## 2019-07-18 NOTE — PROGRESS NOTES
PHARMACY CONSULT NOTE: WARFARIN      Hamlet Terrell is a 53 y.o. male on warfarin therapy for Atrial Fibrillation. PharmD has been consulted for warfarin dosing.     Current order: warfarin 8mg daily    Home dose: 6 mg daily   Coumadin clinic enrollment: Active  INR goal: 2-3     Lab Results   Component Value Date    INR 2.0 (H) 07/18/2019    INR 2.0 (H) 07/17/2019    INR 1.9 (H) 07/16/2019     Significant drug interactions: Prednisone can increase INR (monitor)   Diet: Adult Cardiac     Recommendation(s):   · Continue Warfarin 8 mg daily   · Pharmacy will continue to follow and monitor warfarin     Thank you for the consult,  Bina Garcias, PharmD, BCPS  [id number]      **Note: Consults are reviewed Monday-Friday 7:00am-3:30pm. THE ABOVE RECOMMENDATIONS ARE ONLY SUGGESTED.THE RECOMMENDATIONS SHOULD BE CONSIDERED IN CONJUNCTION WITH ALL PATIENT FACTORS. **

## 2019-07-18 NOTE — PLAN OF CARE
07/18/19 0916   Post-Acute Status   Post-Acute Authorization Placement   Post-Acute Placement Status Referrals Sent     Pt accepted today and ok to dc to [facility] per liaison Frantz with [facility]. Pt ok to d/c and transport to [facility] at 12pm per Edna with [facility], staff aware, facility orders needs to be completed and Sw will arrange transport. Sw did setup w.c van transport for 1415, nurse to call report to , Pt aware.

## 2019-07-18 NOTE — DISCHARGE SUMMARY
Discharge Summary  Hospital Medicine       Name: Hamlet Terrell  YOB: 1965 (Age: 53 y.o.)  Date of Admission: 7/2/2019  Date of Discharge: 7/18/2019  Attending Provider on Discharge: So Tyler MD  [facility] Medicine Team: [facility]  Code status: Full Code    Primary Care Provider: Carlin Swift MD    Discharge Diagnosis:  Active Hospital Problems    Diagnosis  POA    *Acute on chronic combined systolic and diastolic heart failure [I50.43]  Yes    Acute idiopathic gout of right hand [M10.041]  Unknown    Injury of right ring finger [S69.91XA]  Unknown    Dysphagia for pills, idiopathic [R13.10]  Yes     Patient chews pills and opens capsules -- avoid sustained-release formulations.      Shoulder pain [M25.519]  Unknown    Weakness of lower extremity [R29.898]  Yes    Acute pain of right shoulder due to trauma [M25.511, G89.11]  Yes    Neuropathy of both feet [G57.93]  Yes    Prediabetes [R73.03]  Yes    Venous stasis of lower extremity [I87.8]  Yes    Impaired mobility [Z74.09]  Yes    Elevated alkaline phosphatase level [R74.8]  Yes    JEAN-PIERRE (acute kidney injury) [N17.9]  Yes    Chronic kidney disease [N18.9]  Yes     Chronic    Lumbar back pain [M54.5]  Yes    Personal history of venous thrombosis and embolism [Z86.718]  Not Applicable    Atrial fibrillation, chronic [I48.2]  Yes     Chronic    Essential hypertension [I10]  Yes     Chronic    Chronic anticoagulation [Z79.01]  Not Applicable     Chronic    Non compliance w medication regimen [Z91.14]  Not Applicable     Chronic    History of CVA (cerebrovascular accident) [Z86.73]  Not Applicable     Personal account, occurred in 2008/2009 at [facility].        Resolved Hospital Problems    Diagnosis Date Resolved POA    Febrile illness, acute [R50.9] 07/09/2019 Yes       HPI:  52 y/o M with PMH combined CHF (last Echo 4/2019 with EF 23%, diastolic dysfuction, moderated MR and TR), HTN, chronic A. fib (on  coumadin), A. flutter s/p ablation, CVA (in 2009), CAD, ?PE (patient reported), non-compliance, and drug seeking behavior presents c/o of right shoulder/arm pain following an injury from right leg weakness. Patient reports waking up to go to the restroom during the night approximately 4 days ago when he was unable to bare weight on right leg due to weakness. He attempted to get back in the bed and aggravated/injured his right shoulder climbing back into bed. He was hoping the pain would resolve on its own but it has persisted for past 4 days prompting him to come to ED. He reports he can move his right upper extremity but refuses to because he does not want to illicit the pain.  Pt denies fever, chills, appetite change, wt change, CP, SOB (at baseline, 2 pillow orthopnea), palpitations, increased leg swelling (chronic leg swelling and venous stasis changes), N/V/D, confusion, slurred speech, dysphagia, seizures, tremors, syncope. Through chart review, patient seen in ED for various pain complaints in the past.  In the ED: Afebrile without leukocytosis on arrival. Hypertensive at 140s-150s/80s. Hgb 8.7 (baseline). ESR elevated >120, CRP 25.9. INR non-therapeutic (1.2). EKG with A. Fib. Alk Phos 236, bili 1.8. Tox screen positive for benzos and opiates. UA unremarkable. CT head without acute intracranial abnormalities. R shoulder XR with no acute fracture. MRI brain with chronic infarcts. Cervical, lumbar, and thoracic spine MRI limited due to artifact but showed chronic changes and C5-6 level demonstrates appearance of disc osteophyte disease mild anterior impression upon the dural sac without high-grade stenosis.  Foraminal evaluation is limited, there is suggestion of foraminal encroachment on the right.  Correlation for exiting nerve root symptomatology is needed.     Hospital Course:  52 yo M with combined CHF (Echo 4/2019 with EF 23%, diastolic dysfuction, moderated MR and TR), HTN, chronic A-fib (on Coumadin),  A-flutter s/p ablation, CVA (in 2009), CAD, ?PE (patient reported), non-compliance presenting with musculoskeletal complaints and weakness admitted for acute on chronic combined heart failure and JEAN-PIERRE.     Acute on Chronic Combined systolic/diastolic heart failure   -Cardiology consulted, appreciate assistance. No longer following actively  -c/b cardiorenal JEAN-PIERRE  -Acute portion resolving, now back on home lasix regimen  -Repeat ECHO ordered; cancelled by Cardiology  -Converted diuresis to oral dose (Lasix 80 mg p.o. b.i.d.) 7/9 per Cardiology's recommendation  -Continuing home Lasix regimen and fluid restriction     Acute gout flare  -Elevated uric acid level noted earlier in admission and treated with colchicine with minimal symptoms  -Right right finger now with pain and swelling. Was followed by Orthopedics  -s/p R RF PIP arthrocentesis on 07/16/2019, cultures in process but Gram stain negative.  aerobic bacterial cultures with no growth  -Started prednisone 40 mg p.o. daily for 10 day course  -ambulatory referral to Orthopedics     Right sided weakness & acute pain   -IV opiates discontinued, on oral morphine PRN, changed to liquid due to history of chewing pills.  -Gabapentin renal-dose ordered, initially refusing but now taking, titrate dose as tolerated.    -etiology for pian and weakness remains unclear, s/p MRI Brain/C/T/L spine  -s/p Right shoulder arthrocentesis - gram stain negative, culture Aerobic with No Growth  -Neurology consulted: concern for non-pathologic pattern/etiology of pain and weakness    -Continue OT/PT; plan for rehab  -complaints of migratory muscle pain persists; continue OT/PT     Febrile Illness - resolved     Atrial Fibrillation, PAF   -Beta-blocker changed to Lopressor 25mg q6hrs.   -Avoiding sustained-release BB (Toprol) due to patient's habit of chewing pills.  - Episodes of asymptomatic NSVT and PVCs; EKG showed A-fib with PAC  - Warfarin increased to 8 mg per pharmacy recs  -  "Goal K 4-5 and Mg 2-3; he occasionally refuses despite discussion of risks     JEAN-PIERRE on CKD   - Improving  - Continue diuresis for cardiorenal syndrome per cardiolgy and nephrology.  - Work up for paraproteinemia negative.  -PTA losartan.     Hx of VTE  - INR 2.0 today  - Increased warfarin to 8 mg given previous borderline INR. Suspect the current dip is from prior refusal  - If persistently subtherapeutic will bridge with lovenox     Anemia of iron deficiency  -Stable  -normal ferritin but low serum iron and saturation   -Pt declines blood product transfusion, refusal signed. "if I get sick I want to be able to [place] the hospital" - Mr. Terrell on 7/16  -repeat iron studies/retic - retic count low, serum and saturated iron very low.  Given IV iron this admission.  Continue p.o. iron supplementation for now.  May need further IV iron supplementation in the future.    GI bleeding / fecal occult positive test  -patient with chronic iron deficiency anemia, now noted to have FOBT positive  -patient refusing colonoscopy or blood transfusions despite counseling  -well place outpatient referral for colonoscopy, and urge patient to undergo outpatient colonoscopy.  -H/H remains low but stable.  No acute hematochezia, bright red blood per rectum, hematemesis.  Patient reports he is having brown stools.  -will recommend spacing out blood draws, use pediatric tubes for blood draws only.        Labs:  Recent Labs   Lab 07/16/19 0423 07/17/19  0449 07/18/19  0725   WBC 8.07 8.10 10.32   HGB 6.7* 6.8* 6.4*   HCT 22.2* 23.7* 22.1*    349 334     Recent Labs   Lab 07/16/19  0420 07/17/19  0449 07/18/19  0725    140 142   K 3.5 3.6 3.9   CL 92* 94* 99   CO2 31* 32* 31*   BUN 76* 72* 63*   CREATININE 1.9* 1.8* 1.7*   * 147* 118*   CALCIUM 10.0 10.1 10.0   MG 2.1 2.1 2.3   PHOS 3.4 3.5 2.2*     Recent Labs   Lab 07/16/19  0420 07/17/19  0449 07/18/19  0725   ALKPHOS 199* 217* 216*   ALT 12 16 15   AST 27 37 36 "   ALBUMIN 3.3* 3.3* 3.1*   PROT 8.6* 8.8* 8.5*   BILITOT 0.7 0.8 0.7   INR 1.9* 2.0* 2.0*      No results for input(s): POCTGLUCOSE in the last 168 hours.  No results for input(s): CPK, CPKMB, MB, TROPONINI in the last 72 hours.    ROS (Positive in Bold, otherwise negative)  Constitutional: fever, chills, night sweats  CV: chest pain, edema, palpitations  Resp: SOB, cough, sputum production  GI: changes in appetite, NVDC, pain, melena, hematochezia, GERD, hematemesis  : Dysuria, hematuria, urinary urgency, frequency  MSK: arthralgia/myalgia, joint swelling  Neuro/Psych: anxiety, depression    PEx   Temp:  [97.4 °F (36.3 °C)-99.1 °F (37.3 °C)]   Pulse:  []   Resp:  [16-18]   BP: (124-148)/(57-70)   SpO2:  [93 %-96 %]      General: no distress   Lungs: clear to ausculation anteriorly and posteriorly   Heart: regular rate and rhythm   Abdomen: normal bowel sounds, soft, no tenderness   Extremities: no edema. No clubbing or cyanosis     Consultants:  Orthopedics, Neurology, OT/PT, Nephrology, Cardiology, PMR    Current Discharge Medication List      START taking these medications    Details   ergocalciferol (ERGOCALCIFEROL) 50,000 unit Cap Take 1 capsule (50,000 Units total) by mouth every 7 days. for 10 days  Qty: 1 capsule, Refills: 0      gabapentin (NEURONTIN) 250 mg/5 mL (5 mL) solution Take 2 mLs (100 mg total) by mouth 2 (two) times daily.  Qty: 150 mL, Refills: 0      HYDROmorphone 1 mg/mL Liqd Take 0.5 mLs (0.5 mg total) by mouth every 4 (four) hours as needed.  Qty: 473 mL, Refills: 0      isosorbide-hydrALAZINE 20-37.5 mg (BIDIL) 20-37.5 mg Tab Take 1 tablet by mouth 2 (two) times daily.  Qty: 60 tablet, Refills: 11      methocarbamol (ROBAXIN) 500 MG Tab Take 1 tablet (500 mg total) by mouth 2 times daily 2 hours after meal. for 10 days  Qty: 20 tablet, Refills: 0      methyl salicylate-menthol 15-10% 15-10 % Crea Apply topically 3 (three) times daily.  Refills: 0      metoprolol tartrate (LOPRESSOR)  25 MG tablet Take 1 tablet (25 mg total) by mouth every 6 (six) hours.  Qty: 120 tablet, Refills: 11      polyethylene glycol (GLYCOLAX) 17 gram PwPk Take 17 g by mouth 2 (two) times daily.  Qty: 30 each, Refills: 0      predniSONE (DELTASONE) 20 MG tablet Take 2 tablets (40 mg total) by mouth once daily. for 9 days  Qty: 18 tablet, Refills: 0      ramelteon (ROZEREM) 8 mg tablet Take 1 tablet (8 mg total) by mouth nightly as needed for Insomnia.  Qty: 30 tablet, Refills: 0      senna-docusate 8.6-50 mg (PERICOLACE) 8.6-50 mg per tablet Take 1 tablet by mouth 2 (two) times daily.         CONTINUE these medications which have CHANGED    Details   furosemide (LASIX) 80 MG tablet Take 1 tablet (80 mg total) by mouth 2 (two) times daily.  Qty: 60 tablet, Refills: 11      warfarin (COUMADIN) 4 MG tablet Take 2 tablets (8 mg total) by mouth Daily.  Qty: 60 tablet, Refills: 11         CONTINUE these medications which have NOT CHANGED    Details   albuterol (PROVENTIL/VENTOLIN HFA) 90 mcg/actuation inhaler Inhale 1-2 puffs into the lungs every 6 (six) hours as needed for Wheezing. Rescue  Qty: 1 Inhaler, Refills: 0      aspirin (ECOTRIN) 81 MG EC tablet Take 81 mg by mouth once daily.      calcium carbonate (TUMS) 200 mg calcium (500 mg) chewable tablet Take 1 tablet (500 mg total) by mouth 3 (three) times daily as needed.      ferrous sulfate 325 (65 FE) MG EC tablet Take 1 tablet (325 mg total) by mouth once daily.  Refills: 0      nitroGLYCERIN (NITROSTAT) 0.4 MG SL tablet Place 1 tablet (0.4 mg total) under the tongue every 5 (five) minutes as needed for Chest pain. Tablet, Sublingual Sublingual  Qty: 30 tablet, Refills: 11      pantoprazole (PROTONIX) 40 MG tablet Take 1 tablet (40 mg total) by mouth once daily.  Qty: 30 tablet, Refills: 11         STOP taking these medications       HYDROcodone-acetaminophen (NORCO) 5-325 mg per tablet Comments:   Reason for Stopping:         losartan (COZAAR) 25 MG tablet Comments:    Reason for Stopping:         metoprolol succinate (TOPROL-XL) 25 MG 24 hr tablet Comments:   Reason for Stopping:               The relevant and important risks, side effects, and benefits of their medications were reviewed with patient during hospitalization and at discharge. The patient was given the opportunity to discuss and ask questions about their medications, including target symptoms, potential risks, side effects and benefits of their medications, as well as their expected prognosis if non-medication treatment options were chosen.  The patient expresses understanding of all these options and information and voluntarily consents to treatment.    Discharge Diet:cardiac diet with Fluid restriction 1500 per day    Activity: activity as tolerated    Discharge Condition: Fair    Disposition: Rehab Facility    Tests pending at the time of discharge:  Final cultures from arthrocentesis     Time spent  on the discharge of the patient including review of hospital course with the patient. reviewing discharge medications and arranging follow-up care: 37 mins    Discharge examination Patient was seen and examined on the date of discharge and determined to be suitable for discharge.    Discharge plan and follow up:  It is critical that you make your follow-up appointment(s). If you are discharged on the weekend or after business hours, or if we are unable to schedule these appointments for you for any reason, you or a family member need to call during the next business day to schedule your appointment(s).    -Follow up with you PCP, Carlin Swift MD within 1-2 weeks as arranged with SW and treatment team prior to discharge  -Take all medications as prescribed and listed above.  -Recommended Follow-up Tests:  Colonoscopy     - Please return to ED or call your physician if you have:         1. Fevers > 101.5 unresponsive to tylenol.       2. Abdominal pain and/or distention       3. Intractable nausea, vomiting or  diarrhea       4. Inability to tolerate adequate oral intake of food       5. Neurologic changes, chest pain or shortness of breath          Follow-up with PCP, Cardiology, Orthopedics    So Tyler MD  Hospital Medicine Staff  [phone] pager

## 2019-07-18 NOTE — SUBJECTIVE & OBJECTIVE
Interval History 7/18/2019:  Patient is seen for follow-up rehab evaluation and recommendations: Participated with therapy yesterday. Refusing colonoscopy at this time.     HPI, Past Medical, Family, and Social History remains the same as documented in the initial encounter.    Scheduled Medications:    aspirin  81 mg Oral Daily    ergocalciferol  50,000 Units Oral Q7 Days    furosemide  80 mg Oral BID    gabapentin  100 mg Oral BID    isosorbide-hydrALAZINE 20-37.5 mg  1 tablet Oral BID    methocarbamol  500 mg Oral BID PC    methyl salicylate-menthol 15-10%   Topical (Top) TID    metoprolol tartrate  25 mg Oral Q6H    pantoprazole  40 mg Oral Daily    polyethylene glycol  17 g Oral BID    potassium chloride 10%  40 mEq Oral Once    potassium chloride  40 mEq Oral Once    predniSONE  40 mg Oral Daily    senna-docusate 8.6-50 mg  1 tablet Oral BID    warfarin  8 mg Oral Daily       Diagnostic Results: Labs: Reviewed    PRN Medications: albuterol, calcium carbonate, diphenhydrAMINE, HYDROmorphone, nitroGLYCERIN, ramelteon, sodium chloride 0.9%, sodium chloride 0.9%    Review of Systems   Constitutional: Positive for activity change. Negative for fatigue and fever.        R shoulder pain and R leg numbness and decreased mobility    HENT: Negative for trouble swallowing and voice change.    Respiratory: Negative for cough and shortness of breath.    Cardiovascular: Negative for chest pain and leg swelling.   Gastrointestinal: Negative for abdominal distention and abdominal pain.   Genitourinary: Negative for difficulty urinating and flank pain.   Musculoskeletal: Positive for gait problem. Negative for back pain.   Skin: Negative for color change and rash.   Neurological: Positive for weakness. Negative for speech difficulty and numbness.   Psychiatric/Behavioral: Negative for agitation and confusion.     Objective:     Vital Signs (Most Recent):  Temp: 98.2 °F (36.8 °C) (07/18/19 0800)  Pulse: 76  (07/18/19 0800)  Resp: 16 (07/18/19 0800)  BP: (!) 140/70 (07/18/19 0800)  SpO2: 96 % (07/18/19 0800)    Vital Signs (24h Range):  Temp:  [97.4 °F (36.3 °C)-99.1 °F (37.3 °C)] 98.2 °F (36.8 °C)  Pulse:  [] 76  Resp:  [16-18] 16  SpO2:  [93 %-96 %] 96 %  BP: (120-148)/(57-76) 140/70     Physical Exam   Constitutional: He is oriented to person, place, and time. He appears well-developed and well-nourished.   HENT:   Head: Normocephalic and atraumatic.   Eyes: Pupils are equal, round, and reactive to light. EOM are normal.   Neck: Neck supple.   Pulmonary/Chest: Effort normal. No respiratory distress.   Musculoskeletal: He exhibits edema (BLE and R hand).   - RUE and RLE painful upon movement, decreased muscle strength    Neurological: He is alert and oriented to person, place, and time. A sensory deficit (RLE) is present.   Skin: Skin is warm and dry.   Psychiatric: He has a normal mood and affect. His behavior is normal. Thought content normal.   Nursing note and vitals reviewed.    NEUROLOGICAL EXAMINATION:     MENTAL STATUS   Oriented to person, place, and time.     CRANIAL NERVES     CN III, IV, VI   Pupils are equal, round, and reactive to light.  Extraocular motions are normal.

## 2019-07-18 NOTE — PROGRESS NOTES
Spoke to Maria Eugenia Fishman regarding clarification of discharge orders. Pt was due to be discharge to [facility] today. A new order was place for EGD. It wasn't stated if outpatient or inpatient. Per day nurse no clarification for EGD or discharge was done. RN informed MD of this. Also noted that there was no room or transportation assigned. MD asked RN to page SW. There is no SW here at night. MD stated discharge would be pending to tomorrow

## 2019-07-18 NOTE — NURSING
Received and acknowledged discharge instructions ordered at 2009  Hours on 7/17/19.  Provided a copy of AVS to patient.   Attempted to review discharge instructions to patient, but he felt it was a waste of time.   Discontinued PIV in left forearm, tip intact.  Removed telemetry.  Informed patient of future follow-up appointments.  Administered all ordered medications.

## 2019-07-18 NOTE — NURSING
Attempted to call report to [facility] for the 5th time via , recording in place, left voicemail.

## 2019-07-19 NOTE — PLAN OF CARE
07/19/19 1608   Final Note   Assessment Type Final Discharge Note     Patient discharged to [facility]pt [facility] on 7/18/19. Discharge summary faxed to Dr. Carlin Swift (PCP) f [phone] & [employer].

## 2019-07-22 ENCOUNTER — ANTI-COAG VISIT (OUTPATIENT)
Dept: CARDIOLOGY | Facility: CLINIC | Age: 54
End: 2019-07-22

## 2019-07-22 DIAGNOSIS — I48.20 ATRIAL FIBRILLATION, CHRONIC: ICD-10-CM

## 2019-07-22 DIAGNOSIS — I50.43 ACUTE ON CHRONIC COMBINED SYSTOLIC (CONGESTIVE) AND DIASTOLIC (CONGESTIVE) HEART FAILURE: ICD-10-CM

## 2019-07-22 DIAGNOSIS — R79.1 ELEVATED INR: ICD-10-CM

## 2019-07-22 DIAGNOSIS — M54.50 LUMBAR BACK PAIN: ICD-10-CM

## 2019-07-22 DIAGNOSIS — Z79.01 CHRONIC ANTICOAGULATION: ICD-10-CM

## 2019-07-22 NOTE — PROGRESS NOTES
7/2/19 - 7/18/19 admitted for CHF, started on Prednisone 40 mg daily x 9 days (7/27/19 end date with taking for acute inflammation of the joints due to gout attack) and Coumadin changed to 8 mg daily, calendar updated with doses given during admit, chart routed to pharmacist

## 2019-07-24 LAB — BACTERIA SPEC ANAEROBE CULT: NORMAL

## 2019-07-30 ENCOUNTER — HOSPITAL ENCOUNTER (OUTPATIENT)
Dept: RADIOLOGY | Facility: HOSPITAL | Age: 54
Discharge: HOME | End: 2019-07-30
Attending: PHYSICAL MEDICINE & REHABILITATION
Payer: MEDICAID

## 2019-07-30 PROCEDURE — 71045 X-RAY EXAM CHEST 1 VIEW: CPT | Mod: 26,,, | Performed by: RADIOLOGY

## 2019-07-30 PROCEDURE — 71045 XR CHEST 1 VIEW: ICD-10-PCS | Mod: 26,,, | Performed by: RADIOLOGY

## 2019-07-31 NOTE — PROGRESS NOTES
Palmer with [facility] called to report that the patient is being discharged from rehab tomorrow -8/01, Palmer's call back # [phone]

## 2019-08-01 NOTE — PROGRESS NOTES
Patient has not been established with [facility] since enrolling with us. Will contact patient 8/2 to confirm current warfarin dose and discuss whether we can continue to monitor.

## 2019-08-02 ENCOUNTER — ANTI-COAG VISIT (OUTPATIENT)
Dept: CARDIOLOGY | Facility: CLINIC | Age: 54
End: 2019-08-02

## 2019-08-02 DIAGNOSIS — R79.1 ELEVATED INR: ICD-10-CM

## 2019-08-02 DIAGNOSIS — I50.42 CHRONIC COMBINED SYSTOLIC AND DIASTOLIC CHF (CONGESTIVE HEART FAILURE): ICD-10-CM

## 2019-08-02 DIAGNOSIS — M54.50 LUMBAR BACK PAIN: ICD-10-CM

## 2019-08-02 DIAGNOSIS — I48.20 ATRIAL FIBRILLATION, CHRONIC: ICD-10-CM

## 2019-08-02 DIAGNOSIS — Z79.01 CHRONIC ANTICOAGULATION: ICD-10-CM

## 2019-08-02 LAB — INR PPP: 1.7

## 2019-08-02 PROCEDURE — G0180 PR HOME HEALTH MD CERTIFICATION: ICD-10-PCS | Mod: ,,, | Performed by: PHYSICAL MEDICINE & REHABILITATION

## 2019-08-02 PROCEDURE — G0180 MD CERTIFICATION HHA PATIENT: HCPCS | Mod: ,,, | Performed by: PHYSICAL MEDICINE & REHABILITATION

## 2019-08-04 ENCOUNTER — HOSPITAL ENCOUNTER (OUTPATIENT)
Facility: HOSPITAL | Age: 54
Discharge: LEFT BEFORE DISCHARGE | End: 2019-08-05
Attending: EMERGENCY MEDICINE | Admitting: INTERNAL MEDICINE
Payer: MEDICAID

## 2019-08-04 DIAGNOSIS — R79.1 SUBTHERAPEUTIC INTERNATIONAL NORMALIZED RATIO (INR): ICD-10-CM

## 2019-08-04 DIAGNOSIS — Z91.148 NONCOMPLIANCE WITH DIET AND MEDICATION REGIMEN: ICD-10-CM

## 2019-08-04 DIAGNOSIS — I10 ESSENTIAL HYPERTENSION: Chronic | ICD-10-CM

## 2019-08-04 DIAGNOSIS — M10.022 ACUTE IDIOPATHIC GOUT OF LEFT ELBOW: ICD-10-CM

## 2019-08-04 DIAGNOSIS — Z79.01 CHRONIC ANTICOAGULATION: Chronic | ICD-10-CM

## 2019-08-04 DIAGNOSIS — Z91.199 NONCOMPLIANCE WITH DIET AND MEDICATION REGIMEN: ICD-10-CM

## 2019-08-04 DIAGNOSIS — M70.22 OLECRANON BURSITIS, LEFT ELBOW: ICD-10-CM

## 2019-08-04 DIAGNOSIS — N18.30 STAGE 3 CHRONIC KIDNEY DISEASE: ICD-10-CM

## 2019-08-04 DIAGNOSIS — Z86.73 HISTORY OF CVA (CEREBROVASCULAR ACCIDENT): ICD-10-CM

## 2019-08-04 DIAGNOSIS — E87.6 HYPOKALEMIA: Primary | ICD-10-CM

## 2019-08-04 DIAGNOSIS — M25.549: ICD-10-CM

## 2019-08-04 DIAGNOSIS — I50.42 CHRONIC COMBINED SYSTOLIC AND DIASTOLIC CHF (CONGESTIVE HEART FAILURE): ICD-10-CM

## 2019-08-04 DIAGNOSIS — T50.2X5A DIURETIC-INDUCED HYPOKALEMIA: ICD-10-CM

## 2019-08-04 DIAGNOSIS — I48.20 ATRIAL FIBRILLATION, CHRONIC: Chronic | ICD-10-CM

## 2019-08-04 DIAGNOSIS — E87.6 DIURETIC-INDUCED HYPOKALEMIA: ICD-10-CM

## 2019-08-04 DIAGNOSIS — M10.9 ACUTE GOUT OF LEFT HAND, UNSPECIFIED CAUSE: ICD-10-CM

## 2019-08-04 LAB
ALBUMIN SERPL BCP-MCNC: 3.3 G/DL (ref 3.5–5.2)
ALP SERPL-CCNC: 216 U/L (ref 55–135)
ALT SERPL W/O P-5'-P-CCNC: 20 U/L (ref 10–44)
ANION GAP SERPL CALC-SCNC: 15 MMOL/L (ref 8–16)
AST SERPL-CCNC: 30 U/L (ref 10–40)
BASOPHILS # BLD AUTO: 0.03 K/UL (ref 0–0.2)
BASOPHILS NFR BLD: 0.4 % (ref 0–1.9)
BILIRUB SERPL-MCNC: 1.5 MG/DL (ref 0.1–1)
BUN SERPL-MCNC: 45 MG/DL (ref 6–30)
BUN SERPL-MCNC: 48 MG/DL (ref 6–20)
CALCIUM SERPL-MCNC: 9.1 MG/DL (ref 8.7–10.5)
CHLORIDE SERPL-SCNC: 94 MMOL/L (ref 95–110)
CHLORIDE SERPL-SCNC: 96 MMOL/L (ref 95–110)
CO2 SERPL-SCNC: 26 MMOL/L (ref 23–29)
CREAT SERPL-MCNC: 1.6 MG/DL (ref 0.5–1.4)
CREAT SERPL-MCNC: 1.7 MG/DL (ref 0.5–1.4)
DIFFERENTIAL METHOD: ABNORMAL
EOSINOPHIL # BLD AUTO: 0.4 K/UL (ref 0–0.5)
EOSINOPHIL NFR BLD: 4.6 % (ref 0–8)
ERYTHROCYTE [DISTWIDTH] IN BLOOD BY AUTOMATED COUNT: 21.7 % (ref 11.5–14.5)
EST. GFR  (AFRICAN AMERICAN): 52.4 ML/MIN/1.73 M^2
EST. GFR  (NON AFRICAN AMERICAN): 45.4 ML/MIN/1.73 M^2
GLUCOSE SERPL-MCNC: 165 MG/DL (ref 70–110)
GLUCOSE SERPL-MCNC: 172 MG/DL (ref 70–110)
HCT VFR BLD AUTO: 24.8 % (ref 40–54)
HCT VFR BLD CALC: 24 %PCV (ref 36–54)
HGB BLD-MCNC: 7.3 G/DL (ref 14–18)
IMM GRANULOCYTES # BLD AUTO: 0.03 K/UL (ref 0–0.04)
IMM GRANULOCYTES NFR BLD AUTO: 0.4 % (ref 0–0.5)
LYMPHOCYTES # BLD AUTO: 1 K/UL (ref 1–4.8)
LYMPHOCYTES NFR BLD: 12.6 % (ref 18–48)
MCH RBC QN AUTO: 26.2 PG (ref 27–31)
MCHC RBC AUTO-ENTMCNC: 29.4 G/DL (ref 32–36)
MCV RBC AUTO: 89 FL (ref 82–98)
MONOCYTES # BLD AUTO: 1.2 K/UL (ref 0.3–1)
MONOCYTES NFR BLD: 15.3 % (ref 4–15)
NEUTROPHILS # BLD AUTO: 5.2 K/UL (ref 1.8–7.7)
NEUTROPHILS NFR BLD: 66.7 % (ref 38–73)
NRBC BLD-RTO: 0 /100 WBC
PLATELET # BLD AUTO: 163 K/UL (ref 150–350)
PMV BLD AUTO: 9.8 FL (ref 9.2–12.9)
POC IONIZED CALCIUM: 1.05 MMOL/L (ref 1.06–1.42)
POC TCO2 (MEASURED): 28 MMOL/L (ref 23–29)
POTASSIUM BLD-SCNC: 2.8 MMOL/L (ref 3.5–5.1)
POTASSIUM SERPL-SCNC: 3 MMOL/L (ref 3.5–5.1)
PROT SERPL-MCNC: 8 G/DL (ref 6–8.4)
RBC # BLD AUTO: 2.79 M/UL (ref 4.6–6.2)
SAMPLE: ABNORMAL
SODIUM BLD-SCNC: 135 MMOL/L (ref 136–145)
SODIUM SERPL-SCNC: 137 MMOL/L (ref 136–145)
WBC # BLD AUTO: 7.8 K/UL (ref 3.9–12.7)

## 2019-08-04 PROCEDURE — 85025 COMPLETE CBC W/AUTO DIFF WBC: CPT

## 2019-08-04 PROCEDURE — 99285 EMERGENCY DEPT VISIT HI MDM: CPT | Mod: 25

## 2019-08-04 PROCEDURE — 93010 ELECTROCARDIOGRAM REPORT: CPT | Mod: ,,, | Performed by: INTERNAL MEDICINE

## 2019-08-04 PROCEDURE — 80047 BASIC METABLC PNL IONIZED CA: CPT | Mod: 91

## 2019-08-04 PROCEDURE — G0378 HOSPITAL OBSERVATION PER HR: HCPCS

## 2019-08-04 PROCEDURE — 99285 PR EMERGENCY DEPT VISIT,LEVEL V: ICD-10-PCS | Mod: ,,, | Performed by: PHYSICIAN ASSISTANT

## 2019-08-04 PROCEDURE — 99285 EMERGENCY DEPT VISIT HI MDM: CPT | Mod: ,,, | Performed by: PHYSICIAN ASSISTANT

## 2019-08-04 PROCEDURE — 93010 EKG 12-LEAD: ICD-10-PCS | Mod: ,,, | Performed by: INTERNAL MEDICINE

## 2019-08-04 PROCEDURE — 93005 ELECTROCARDIOGRAM TRACING: CPT

## 2019-08-04 PROCEDURE — 80053 COMPREHEN METABOLIC PANEL: CPT

## 2019-08-04 RX ORDER — POTASSIUM CHLORIDE 7.45 MG/ML
30 INJECTION INTRAVENOUS
Status: DISCONTINUED | OUTPATIENT
Start: 2019-08-04 | End: 2019-08-04

## 2019-08-04 RX ORDER — TRIAMCINOLONE ACETONIDE 40 MG/ML
40 INJECTION, SUSPENSION INTRA-ARTICULAR; INTRAMUSCULAR
Status: DISCONTINUED | OUTPATIENT
Start: 2019-08-05 | End: 2019-08-05 | Stop reason: HOSPADM

## 2019-08-04 RX ORDER — POTASSIUM CHLORIDE 20 MEQ/15ML
40 SOLUTION ORAL
Status: DISCONTINUED | OUTPATIENT
Start: 2019-08-04 | End: 2019-08-04

## 2019-08-04 RX ORDER — SODIUM CHLORIDE 0.9 % (FLUSH) 0.9 %
10 SYRINGE (ML) INJECTION
Status: CANCELLED | OUTPATIENT
Start: 2019-08-05

## 2019-08-04 RX ORDER — POTASSIUM CHLORIDE 7.45 MG/ML
10 INJECTION INTRAVENOUS
Status: DISPENSED | OUTPATIENT
Start: 2019-08-05 | End: 2019-08-05

## 2019-08-05 ENCOUNTER — ANTI-COAG VISIT (OUTPATIENT)
Dept: CARDIOLOGY | Facility: CLINIC | Age: 54
End: 2019-08-05
Payer: MEDICAID

## 2019-08-05 VITALS
RESPIRATION RATE: 16 BRPM | BODY MASS INDEX: 34.36 KG/M2 | OXYGEN SATURATION: 100 % | TEMPERATURE: 99 F | HEART RATE: 78 BPM | DIASTOLIC BLOOD PRESSURE: 70 MMHG | HEIGHT: 69 IN | SYSTOLIC BLOOD PRESSURE: 130 MMHG | WEIGHT: 232 LBS

## 2019-08-05 DIAGNOSIS — M54.50 LUMBAR BACK PAIN: ICD-10-CM

## 2019-08-05 DIAGNOSIS — Z79.01 CHRONIC ANTICOAGULATION: ICD-10-CM

## 2019-08-05 DIAGNOSIS — I50.42 CHRONIC COMBINED SYSTOLIC AND DIASTOLIC CHF (CONGESTIVE HEART FAILURE): ICD-10-CM

## 2019-08-05 DIAGNOSIS — I48.20 ATRIAL FIBRILLATION, CHRONIC: ICD-10-CM

## 2019-08-05 DIAGNOSIS — R79.1 ELEVATED INR: ICD-10-CM

## 2019-08-05 PROBLEM — M10.9 ACUTE GOUT OF LEFT HAND: Status: ACTIVE | Noted: 2019-08-05

## 2019-08-05 PROBLEM — E87.6 DIURETIC-INDUCED HYPOKALEMIA: Status: ACTIVE | Noted: 2019-08-05

## 2019-08-05 PROBLEM — T50.2X5A DIURETIC-INDUCED HYPOKALEMIA: Status: ACTIVE | Noted: 2019-08-05

## 2019-08-05 PROBLEM — M10.022 ACUTE IDIOPATHIC GOUT OF LEFT ELBOW: Status: ACTIVE | Noted: 2019-08-05

## 2019-08-05 LAB
CRP SERPL-MCNC: 63.7 MG/L (ref 0–8.2)
ERYTHROCYTE [SEDIMENTATION RATE] IN BLOOD BY WESTERGREN METHOD: 117 MM/HR (ref 0–23)
INR PPP: 1.2 (ref 0.8–1.2)
MAGNESIUM SERPL-MCNC: 2.2 MG/DL (ref 1.6–2.6)
PHOSPHATE SERPL-MCNC: 2.9 MG/DL (ref 2.7–4.5)
PROTHROMBIN TIME: 12 SEC (ref 9–12.5)
URATE SERPL-MCNC: 14.7 MG/DL (ref 3.4–7)

## 2019-08-05 PROCEDURE — 85610 PROTHROMBIN TIME: CPT

## 2019-08-05 PROCEDURE — 99219 PR INITIAL OBSERVATION CARE,LEVL II: CPT | Mod: ,,, | Performed by: PHYSICIAN ASSISTANT

## 2019-08-05 PROCEDURE — 85652 RBC SED RATE AUTOMATED: CPT

## 2019-08-05 PROCEDURE — 83735 ASSAY OF MAGNESIUM: CPT

## 2019-08-05 PROCEDURE — 86140 C-REACTIVE PROTEIN: CPT

## 2019-08-05 PROCEDURE — 93793 ANTICOAG MGMT PT WARFARIN: CPT | Mod: ,,,

## 2019-08-05 PROCEDURE — 99219 PR INITIAL OBSERVATION CARE,LEVL II: ICD-10-PCS | Mod: ,,, | Performed by: PHYSICIAN ASSISTANT

## 2019-08-05 PROCEDURE — 84100 ASSAY OF PHOSPHORUS: CPT

## 2019-08-05 PROCEDURE — G0378 HOSPITAL OBSERVATION PER HR: HCPCS

## 2019-08-05 PROCEDURE — 93793 PR ANTICOAGULANT MGMT FOR PT TAKING WARFARIN: ICD-10-PCS | Mod: ,,,

## 2019-08-05 PROCEDURE — 84550 ASSAY OF BLOOD/URIC ACID: CPT

## 2019-08-05 RX ORDER — ISOSORBIDE DINITRATE AND HYDRALAZINE HYDROCHLORIDE 37.5; 2 MG/1; MG/1
1 TABLET ORAL 2 TIMES DAILY
Status: DISCONTINUED | OUTPATIENT
Start: 2019-08-05 | End: 2019-08-05 | Stop reason: HOSPADM

## 2019-08-05 RX ORDER — WARFARIN SODIUM 5 MG/1
10 TABLET ORAL DAILY
Status: DISCONTINUED | OUTPATIENT
Start: 2019-08-05 | End: 2019-08-05 | Stop reason: HOSPADM

## 2019-08-05 RX ORDER — DEXTROSE MONOHYDRATE 100 MG/ML
12.5 INJECTION, SOLUTION INTRAVENOUS
Status: DISCONTINUED | OUTPATIENT
Start: 2019-08-05 | End: 2019-08-05 | Stop reason: HOSPADM

## 2019-08-05 RX ORDER — IBUPROFEN 200 MG
16 TABLET ORAL
Status: DISCONTINUED | OUTPATIENT
Start: 2019-08-05 | End: 2019-08-05 | Stop reason: HOSPADM

## 2019-08-05 RX ORDER — POTASSIUM CHLORIDE 20 MEQ/15ML
40 SOLUTION ORAL
Status: DISCONTINUED | OUTPATIENT
Start: 2019-08-05 | End: 2019-08-05

## 2019-08-05 RX ORDER — AMOXICILLIN 250 MG
1 CAPSULE ORAL 2 TIMES DAILY
Status: DISCONTINUED | OUTPATIENT
Start: 2019-08-05 | End: 2019-08-05 | Stop reason: HOSPADM

## 2019-08-05 RX ORDER — METOPROLOL TARTRATE 25 MG/1
25 TABLET, FILM COATED ORAL EVERY 6 HOURS
Status: DISCONTINUED | OUTPATIENT
Start: 2019-08-05 | End: 2019-08-05 | Stop reason: HOSPADM

## 2019-08-05 RX ORDER — POLYETHYLENE GLYCOL 3350 17 G/17G
17 POWDER, FOR SOLUTION ORAL 2 TIMES DAILY
Status: DISCONTINUED | OUTPATIENT
Start: 2019-08-05 | End: 2019-08-05 | Stop reason: HOSPADM

## 2019-08-05 RX ORDER — DEXTROSE MONOHYDRATE 100 MG/ML
25 INJECTION, SOLUTION INTRAVENOUS
Status: DISCONTINUED | OUTPATIENT
Start: 2019-08-05 | End: 2019-08-05 | Stop reason: HOSPADM

## 2019-08-05 RX ORDER — AMOXICILLIN 250 MG
1 CAPSULE ORAL 2 TIMES DAILY PRN
Status: DISCONTINUED | OUTPATIENT
Start: 2019-08-05 | End: 2019-08-05 | Stop reason: HOSPADM

## 2019-08-05 RX ORDER — RAMELTEON 8 MG/1
8 TABLET ORAL NIGHTLY PRN
Status: DISCONTINUED | OUTPATIENT
Start: 2019-08-05 | End: 2019-08-05 | Stop reason: HOSPADM

## 2019-08-05 RX ORDER — ENOXAPARIN SODIUM 150 MG/ML
1 INJECTION SUBCUTANEOUS
Status: DISCONTINUED | OUTPATIENT
Start: 2019-08-05 | End: 2019-08-05 | Stop reason: HOSPADM

## 2019-08-05 RX ORDER — IBUPROFEN 200 MG
24 TABLET ORAL
Status: DISCONTINUED | OUTPATIENT
Start: 2019-08-05 | End: 2019-08-05 | Stop reason: HOSPADM

## 2019-08-05 RX ORDER — FUROSEMIDE 40 MG/1
80 TABLET ORAL 2 TIMES DAILY
Status: DISCONTINUED | OUTPATIENT
Start: 2019-08-05 | End: 2019-08-05 | Stop reason: HOSPADM

## 2019-08-05 RX ORDER — ISOSORBIDE DINITRATE AND HYDRALAZINE HYDROCHLORIDE 37.5; 2 MG/1; MG/1
1 TABLET ORAL 2 TIMES DAILY
Status: DISCONTINUED | OUTPATIENT
Start: 2019-08-05 | End: 2019-08-05

## 2019-08-05 RX ORDER — ACETAMINOPHEN 325 MG/1
650 TABLET ORAL EVERY 4 HOURS PRN
Status: DISCONTINUED | OUTPATIENT
Start: 2019-08-05 | End: 2019-08-05 | Stop reason: HOSPADM

## 2019-08-05 RX ORDER — IPRATROPIUM BROMIDE AND ALBUTEROL SULFATE 2.5; .5 MG/3ML; MG/3ML
3 SOLUTION RESPIRATORY (INHALATION) EVERY 4 HOURS PRN
Status: DISCONTINUED | OUTPATIENT
Start: 2019-08-05 | End: 2019-08-05 | Stop reason: HOSPADM

## 2019-08-05 RX ORDER — GABAPENTIN 250 MG/5ML
100 SOLUTION ORAL 2 TIMES DAILY
Status: DISCONTINUED | OUTPATIENT
Start: 2019-08-05 | End: 2019-08-05 | Stop reason: HOSPADM

## 2019-08-05 RX ORDER — PANTOPRAZOLE SODIUM 40 MG/1
40 TABLET, DELAYED RELEASE ORAL DAILY
Status: DISCONTINUED | OUTPATIENT
Start: 2019-08-05 | End: 2019-08-05 | Stop reason: HOSPADM

## 2019-08-05 RX ORDER — CALCIUM CARBONATE 200(500)MG
500 TABLET,CHEWABLE ORAL 3 TIMES DAILY PRN
Status: DISCONTINUED | OUTPATIENT
Start: 2019-08-05 | End: 2019-08-05 | Stop reason: HOSPADM

## 2019-08-05 RX ORDER — ASPIRIN 81 MG/1
81 TABLET ORAL DAILY
Status: DISCONTINUED | OUTPATIENT
Start: 2019-08-05 | End: 2019-08-05 | Stop reason: HOSPADM

## 2019-08-05 RX ORDER — FERROUS SULFATE 325(65) MG
325 TABLET, DELAYED RELEASE (ENTERIC COATED) ORAL DAILY
Status: DISCONTINUED | OUTPATIENT
Start: 2019-08-05 | End: 2019-08-05 | Stop reason: HOSPADM

## 2019-08-05 RX ORDER — GLUCAGON 1 MG
1 KIT INJECTION
Status: DISCONTINUED | OUTPATIENT
Start: 2019-08-05 | End: 2019-08-05 | Stop reason: HOSPADM

## 2019-08-05 RX ORDER — OXYMETAZOLINE HCL 0.05 %
2 SPRAY, NON-AEROSOL (ML) NASAL ONCE AS NEEDED
Status: DISCONTINUED | OUTPATIENT
Start: 2019-08-05 | End: 2019-08-05 | Stop reason: HOSPADM

## 2019-08-05 RX ORDER — SODIUM CHLORIDE 0.9 % (FLUSH) 0.9 %
5 SYRINGE (ML) INJECTION
Status: DISCONTINUED | OUTPATIENT
Start: 2019-08-05 | End: 2019-08-05 | Stop reason: HOSPADM

## 2019-08-05 RX ORDER — ONDANSETRON 4 MG/1
4 TABLET, ORALLY DISINTEGRATING ORAL EVERY 8 HOURS PRN
Status: DISCONTINUED | OUTPATIENT
Start: 2019-08-05 | End: 2019-08-05 | Stop reason: HOSPADM

## 2019-08-05 NOTE — DISCHARGE SUMMARY
"[facility] Hospital Medicine  Discharge Summary      Patient Name: Hamlet Terrell  MRN: [id number]  Admission Date: 8/4/2019  Hospital Length of Stay: 0 days  Discharge Date and Time:  08/05/2019 4:14 AM  Attending Physician: No att. providers found   Discharging Provider: Robb Claros PA-C  Primary Care Provider: Carlin Swift MD  [facility] Medicine Team: [facility] HOSP MED E Robb Claros PA-C    HPI:   Hamlet Terrell is a 53 y.o. M with pmhx combined CHF (last Echo 4/2019 with EF 23%, diastolic dysfuction, moderated MR and TR), HTN, chronic A. fib (on coumadin), A. flutter s/p ablation, CVA (in 2009), CAD, ?PE (patient reported), non-compliance, and drug seeking behavior who presents to the ED for evaluation of painless L elbow/L third PIP swelling/tingling x4 days.  Patient is poor historian and has poor medical insight.  Pain incited with ROM and palpation.  Patient denies any trauma or prior injury.  Patient reports previous gout attack last month with no improvement in sxs (to R RF PIP) after treatment..  He denies a hx of gout prior to last month and has never seen a rheumatologist.  He denies any fever, chills, sweats, NVD, dizziness.  He reports a nonspecific dry cough x3 days with a few events of productive sputum "with blood" and recent epistaxis (x3 days) that resolved yesterday.  He denies any recent red meat, beer, or sugary drinks.  Eats baked fish less than once a month.  Reports compliance with home medications but reports recently taken off of potassium bicarbonate 25 mEq daily (uncertain when last dose was).  Also reports taking 12.5 mg warfarin daily.     Of note, admitted 7/2-7/18 for acute heart failure. R RF PIP with edema and uric acid was 16.6- initially treated with colchicine.  Ortho was consulted and arthrocentesis performed 7/16 (cultures negative).  Started on prednisone x10 days (end date: 7/29) and referred to ortho.  Also of note, was c/o R sided pain/weakness " "during last admission. He underwent CTH, MRI C/T/L spine and R shoulder.  Ortho performed R shoulder arthrocentesis (cultures negative).  Neuro consulted and discharged to [facility].      ED:  Afebrile, no leukocytosis, HDS.  Hg improved from previous.  K 3.0.  ER admitted patient for hypokalemia d/t concern of chronic high dose lasix use.     * No surgery found *      Hospital Course:   Hamlet Terrell is a 53 y.o. M who was admitted for hypokalemia and gout flare.  Patient refused potassium replacement and stated "I have potassium at home I can take" and left AMA.       Consults:     * Diuretic-induced hypokalemia  K 3.0 while on lasix 80 mg PO BID  - replete IV and PO  - hold evening lasix dose as euvolemic  - likely discharge home with 25 mEq of potassium bicarbonate PO daily and f/u with PCP  - monitor on tele  Left AMA 3 hours after being admitted    Acute idiopathic gout of left elbow  Acute gout of left hand  - has a hx of elevated uric acid but denies any hx of gout. Will need OP f/u with Rheum  - kenalog depo IM x1; should start daily allopurinol (refer to PCP/Rheum)  - /CRP 63.7  - Uric acid 14.7  - check RF, Anti CCP IGg      Final Active Diagnoses:    Diagnosis Date Noted POA    PRINCIPAL PROBLEM:  Diuretic-induced hypokalemia [E87.6, T50.2X5A] 08/05/2019 Unknown    Acute idiopathic gout of left elbow [M10.022] 08/05/2019 Unknown    Chronic anticoagulation [Z79.01] 01/01/2013 Not Applicable     Chronic    Chronic combined systolic and diastolic CHF (congestive heart failure) [I50.42] 04/22/2019 Yes    Stage 3 chronic kidney disease [N18.3] 05/31/2016 Yes    Noncompliance with diet and medication regimen [Z91.11, Z91.14] 11/25/2015 Not Applicable    History of CVA (cerebrovascular accident) [Z86.73] 01/01/2013 Not Applicable    Essential hypertension [I10] 01/02/2013 Yes     Chronic    Acute gout of left hand [M10.9] 08/05/2019 Unknown    Subtherapeutic international normalized ratio (INR) " [R79.1] 12/27/2015 Unknown    Personal history of venous thrombosis and embolism [Z86.718] 09/22/2013 Not Applicable    Atrial fibrillation, chronic [I48.2] 01/02/2013 Yes     Chronic      Problems Resolved During this Admission:       Discharged Condition: against medical advice    Disposition: Left Against Medical Adv*    Follow Up:    Patient Instructions:   No discharge procedures on file.    Significant Diagnostic Studies: Labs:   CMP   Recent Labs   Lab 08/04/19 2049      K 3.0*   CL 96   CO2 26   *   BUN 48*   CREATININE 1.7*   CALCIUM 9.1   PROT 8.0   ALBUMIN 3.3*   BILITOT 1.5*   ALKPHOS 216*   AST 30   ALT 20   ANIONGAP 15   ESTGFRAFRICA 52.4*   EGFRNONAA 45.4*    and CBC   Recent Labs   Lab 08/04/19 2049   WBC 7.80   HGB 7.3*   HCT 24.8*          Pending Diagnostic Studies:     None         Medications:  Reconciled Home Medications:      Medication List      ASK your doctor about these medications    albuterol 90 mcg/actuation inhaler  Commonly known as:  PROVENTIL/VENTOLIN HFA  Inhale 1-2 puffs into the lungs every 6 (six) hours as needed for Wheezing. Rescue     aspirin 81 MG EC tablet  Commonly known as:  ECOTRIN  Take 81 mg by mouth once daily.     calcium carbonate 200 mg calcium (500 mg) chewable tablet  Commonly known as:  TUMS  Take 1 tablet (500 mg total) by mouth 3 (three) times daily as needed.     ferrous sulfate 325 (65 FE) MG EC tablet  Take 1 tablet (325 mg total) by mouth once daily.     furosemide 80 MG tablet  Commonly known as:  LASIX  Take 1 tablet (80 mg total) by mouth 2 (two) times daily.     gabapentin 250 mg/5 mL (5 mL) solution  Commonly known as:  NEURONTIN  Take 2 mLs (100 mg total) by mouth 2 (two) times daily.     isosorbide-hydrALAZINE 20-37.5 mg 20-37.5 mg Tab  Commonly known as:  BIDIL  Take 1 tablet by mouth 2 (two) times daily.     methyl salicylate-menthol 15-10% 15-10 % Crea  Apply topically 3 (three) times daily.     metoprolol tartrate 25 MG  tablet  Commonly known as:  LOPRESSOR  Take 1 tablet (25 mg total) by mouth every 6 (six) hours.     nitroGLYCERIN 0.4 MG SL tablet  Commonly known as:  NITROSTAT  Place 1 tablet (0.4 mg total) under the tongue every 5 (five) minutes as needed for Chest pain. Tablet, Sublingual Sublingual     pantoprazole 40 MG tablet  Commonly known as:  PROTONIX  Take 1 tablet (40 mg total) by mouth once daily.     polyethylene glycol 17 gram Pwpk  Commonly known as:  GLYCOLAX  Take 17 g by mouth 2 (two) times daily.     ramelteon 8 mg tablet  Commonly known as:  ROZEREM  Take 1 tablet (8 mg total) by mouth nightly as needed for Insomnia.     senna-docusate 8.6-50 mg 8.6-50 mg per tablet  Commonly known as:  PERICOLACE  Take 1 tablet by mouth 2 (two) times daily.     warfarin 4 MG tablet  Commonly known as:  COUMADIN  Take 2 tablets (8 mg total) by mouth Daily.            Indwelling Lines/Drains at time of discharge:   Lines/Drains/Airways          None          Time spent on the discharge of patient: 5 minutes  Patient left YURI Claros PA-C  Department of Hospital Medicine  [facility]

## 2019-08-05 NOTE — PROGRESS NOTES
Unable to reach patient 8/02 regarding 8/02 lab result, Patient is now in the hospital at [facility] since 8/04/19

## 2019-08-05 NOTE — H&P
"[facility] Hospital Medicine  History & Physical    Patient Name: Hamlet Terrell  MRN: [id number]  Admission Date: 8/4/2019  Attending Physician: VIKY Tse MD   Primary Care Provider: Carlin Swift MD    [facility] Medicine Team: Networked reference to record PCT  Robb Claros PA-C     Patient information was obtained from patient, past medical records and ER records.     Subjective:     Principal Problem:Diuretic-induced hypokalemia    Chief Complaint:   Chief Complaint   Patient presents with    finger and elbow pain        HPI: Hamlet Terrell is a 53 y.o. M with pmhx combined CHF (last Echo 4/2019 with EF 23%, diastolic dysfuction, moderated MR and TR), HTN, chronic A. fib (on coumadin), A. flutter s/p ablation, CVA (in 2009), CAD, ?PE (patient reported), non-compliance, and drug seeking behavior who presents to the ED for evaluation of painless L elbow/L third PIP swelling/tingling x4 days.  Patient is poor historian and has poor medical insight.  Pain incited with ROM and palpation.  Patient denies any trauma or prior injury.  Patient reports previous gout attack last month with no improvement in sxs (to R RF PIP) after treatment..  He denies a hx of gout prior to last month and has never seen a rheumatologist.  He denies any fever, chills, sweats, NVD, dizziness.  He reports a nonspecific dry cough x3 days with a few events of productive sputum "with blood" and recent epistaxis (x3 days) that resolved yesterday.  He denies any recent red meat, beer, or sugary drinks.  Eats baked fish less than once a month.  Reports compliance with home medications but reports recently taken off of potassium bicarbonate 25 mEq daily (uncertain when last dose was).  Also reports taking 12.5 mg warfarin daily.     Of note, admitted 7/2-7/18 for acute heart failure. R RF PIP with edema and uric acid was 16.6- initially treated with colchicine.  Ortho was consulted and arthrocentesis performed 7/16 " (cultures negative).  Started on prednisone x10 days (end date: 7/29) and referred to ortho.  Also of note, was c/o R sided pain/weakness during last admission. He underwent CTH, MRI C/T/L spine and R shoulder.  Ortho performed R shoulder arthrocentesis (cultures negative).  Neuro consulted and discharged to [facility].      ED:  Afebrile, no leukocytosis, HDS.  Hg improved from previous.  K 3.0.  ER admitted patient for hypokalemia d/t concern of chronic high dose lasix use.     Past Medical History:   Diagnosis Date    Anticoagulant long-term use     Cardiomegaly     Chronic combined systolic and diastolic congestive heart failure     Coronary artery disease     Heart attack 2006    Hypertension     Hyperthyroidism, subclinical 1/2/2013    MI (myocardial infarction) 9/22/2013    MI in 2009      Paroxysmal atrial fibrillation     PE (pulmonary embolism) 1/1/2013    IN 2010     S/P ablation of atrial flutter 2008    Stroke 2009    no residual weaknesses       Past Surgical History:   Procedure Laterality Date    RADIOFREQUENCY ABLATION  01/08/2008    for atrial flutter       Review of patient's allergies indicates:   Allergen Reactions    Acetaminophen      Itching    Oxycodone-acetaminophen      Other reaction(s): Itching    Ace inhibitors Other (See Comments)     cough       Current Facility-Administered Medications on File Prior to Encounter   Medication    [DISCONTINUED] gabapentin capsule    [DISCONTINUED] GENERIC EXTERNAL MEDICATION    [DISCONTINUED] GENERIC EXTERNAL MEDICATION    [DISCONTINUED] metOLazone tablet     Current Outpatient Medications on File Prior to Encounter   Medication Sig    albuterol (PROVENTIL/VENTOLIN HFA) 90 mcg/actuation inhaler Inhale 1-2 puffs into the lungs every 6 (six) hours as needed for Wheezing. Rescue    aspirin (ECOTRIN) 81 MG EC tablet Take 81 mg by mouth once daily.    calcium carbonate (TUMS) 200 mg calcium (500 mg) chewable tablet Take 1 tablet (500 mg  total) by mouth 3 (three) times daily as needed.    ferrous sulfate 325 (65 FE) MG EC tablet Take 1 tablet (325 mg total) by mouth once daily.    furosemide (LASIX) 80 MG tablet Take 1 tablet (80 mg total) by mouth 2 (two) times daily.    gabapentin (NEURONTIN) 250 mg/5 mL (5 mL) solution Take 2 mLs (100 mg total) by mouth 2 (two) times daily.    isosorbide-hydrALAZINE 20-37.5 mg (BIDIL) 20-37.5 mg Tab Take 1 tablet by mouth 2 (two) times daily.    methyl salicylate-menthol 15-10% 15-10 % Crea Apply topically 3 (three) times daily.    metoprolol tartrate (LOPRESSOR) 25 MG tablet Take 1 tablet (25 mg total) by mouth every 6 (six) hours.    nitroGLYCERIN (NITROSTAT) 0.4 MG SL tablet Place 1 tablet (0.4 mg total) under the tongue every 5 (five) minutes as needed for Chest pain. Tablet, Sublingual Sublingual    pantoprazole (PROTONIX) 40 MG tablet Take 1 tablet (40 mg total) by mouth once daily.    polyethylene glycol (GLYCOLAX) 17 gram PwPk Take 17 g by mouth 2 (two) times daily.    ramelteon (ROZEREM) 8 mg tablet Take 1 tablet (8 mg total) by mouth nightly as needed for Insomnia.    senna-docusate 8.6-50 mg (PERICOLACE) 8.6-50 mg per tablet Take 1 tablet by mouth 2 (two) times daily.    warfarin (COUMADIN) 4 MG tablet Take 2 tablets (8 mg total) by mouth Daily.     Family History     Problem Relation (Age of Onset)    Alcohol abuse Father    Hypertension Mother, Father, Sister, Brother    Stroke Mother        Tobacco Use    Smoking status: Never Smoker    Smokeless tobacco: Never Used   Substance and Sexual Activity    Alcohol use: No    Drug use: No    Sexual activity: Never     Review of Systems   Constitutional: Negative for chills, fatigue, fever and unexpected weight change.   HENT: Negative for ear pain and sore throat.    Eyes: Negative for pain and visual disturbance.   Respiratory: Positive for cough. Negative for shortness of breath.    Cardiovascular: Negative for chest pain and  palpitations.   Gastrointestinal: Negative for abdominal pain, diarrhea, nausea and vomiting.   Endocrine: Negative for heat intolerance and polydipsia.   Genitourinary: Negative for dysuria, frequency and urgency.   Musculoskeletal: Positive for joint swelling. Negative for back pain and gait problem.        R ring finger pain to palpation  L elbow, L middle finger swelling, pain with ROM   Skin: Negative for rash and wound.   Neurological: Negative for dizziness and weakness.   Psychiatric/Behavioral: Negative for confusion. The patient is not nervous/anxious.      Objective:     Vital Signs (Most Recent):  Temp: 98.6 °F (37 °C) (08/05/19 0032)  Pulse: 80 (08/05/19 0032)  Resp: 17 (08/05/19 0032)  BP: 127/60 (08/05/19 0032)  SpO2: 98 % (08/05/19 0032) Vital Signs (24h Range):  Temp:  [98.4 °F (36.9 °C)-98.8 °F (37.1 °C)] 98.6 °F (37 °C)  Pulse:  [71-89] 80  Resp:  [17-18] 17  SpO2:  [98 %-100 %] 98 %  BP: (121-136)/(58-61) 127/60     Weight: 105.2 kg (232 lb)  Body mass index is 34.26 kg/m².    Physical Exam   Constitutional: He is oriented to person, place, and time. He appears well-developed and well-nourished.   Non-toxic, chronically ill appearing male in NAD    HENT:   Head: Normocephalic and atraumatic.   Eyes: Pupils are equal, round, and reactive to light. Conjunctivae and EOM are normal.   Neck: Normal range of motion. Neck supple.   Cardiovascular: Normal rate, normal heart sounds and intact distal pulses. An irregularly irregular rhythm present.   Pulmonary/Chest: Effort normal and breath sounds normal. He has no wheezes. He has no rales.   No wheezing, rales, or abnormal lung sounds  No evidence of respiratory distress   Abdominal: Soft. Bowel sounds are normal. He exhibits no distension. There is no tenderness.   Musculoskeletal: Normal range of motion. He exhibits edema and tenderness. He exhibits no deformity.   L 3rd digit PIP, L olecranon process/bursa with mild warmth to joints and pain with  ROM  TTP to R middle digit PIP   Neurological: He is alert and oriented to person, place, and time.   Skin: Skin is warm and dry.   Chronic appearing, woody color to BLE with hyperkeratosis and non-pitting edema below BL knees   Psychiatric: He has a normal mood and affect. His behavior is normal. Judgment and thought content normal.   Poor medical insight  Disconcertingly michael in speech   Nursing note and vitals reviewed.        CRANIAL NERVES     CN III, IV, VI   Pupils are equal, round, and reactive to light.  Extraocular motions are normal.        Significant Labs:   CBC:   Recent Labs   Lab 08/04/19 2025 08/04/19 2049   WBC  --  7.80   HGB  --  7.3*   HCT 24* 24.8*   PLT  --  163     CMP:   Recent Labs   Lab 08/04/19 2049      K 3.0*   CL 96   CO2 26   *   BUN 48*   CREATININE 1.7*   CALCIUM 9.1   PROT 8.0   ALBUMIN 3.3*   BILITOT 1.5*   ALKPHOS 216*   AST 30   ALT 20   ANIONGAP 15   EGFRNONAA 45.4*       Significant Imaging: I have reviewed all pertinent imaging results/findings within the past 24 hours.     Assessment/Plan:     * Diuretic-induced hypokalemia  K 3.0 while on lasix 80 mg PO BID  - replete IV and PO  - hold evening lasix dose as euvolemic  - likely discharge home with 25 mEq of potassium bicarbonate PO daily and f/u with PCP  - monitor on tele    Acute idiopathic gout of left elbow  Acute gout of left hand  - has a hx of elevated uric acid but denies any hx of gout. Will need OP f/u with Rheum  - kenalog depo IM x1; should start daily allopurinol (refer to PCP/Rheum)  - /CRP 63.7  - check uric acid  - check RF, Anti CCP IGg    Chronic anticoagulation  Subtherapeutic international normalized ratio (INR)  Personal history of venous thrombosis and embolism  Atrial fibrillation, chronic    - INR 1.2; h/o noncompliance with warfarin (goal 2-3).  Will restart lovenox bridge but has refused coumadin and lovenox bridge in the past.  Has previously refused starting NOAC.   - c/w  warfarin 10 mg PO daily; check INR daily  - c/w metoprolol tartate 25 mg Q6 hours  - Goal K 4-5 and Mg 2-3; he occasionally refuses despite discussion of risks    Chronic combined systolic and diastolic CHF (congestive heart failure)  Euvolemic  - c/w lasix 80 mg PO BID; hold tonight's dose (8/4) d/t hypokalemia  - strict I/O, daily weights, tele  - well-known to cardiology service    Stage 3 chronic kidney disease  Appears to be at baseline  - avoid nephrotoxic agents, renally dose medications    Noncompliance with diet and medication regimen  Difficulty during previous hospital stays for treatment plan noncompliance and refusing certain interventions.  Patient has poor insight.  - needs outpatient psych f/u  - avoid opiates    History of CVA (cerebrovascular accident)  Chronic L frontal/parietal lobe infarction on recent MRI brain  - no residual deficits  - c/w statin, warfarin    Essential hypertension  Controlled  - c/w home meds    VTE Risk Mitigation (From admission, onward)        Ordered     enoxaparin injection 110 mg  Every 12 hours (non-standard times)      08/05/19 0116     warfarin (COUMADIN) tablet 10 mg  Daily      08/05/19 0046     IP VTE HIGH RISK PATIENT  Once      08/05/19 0010     Place CAMI hose  Until discontinued      08/05/19 0010     Place sequential compression device  Until discontinued      08/05/19 0010             Robb Claros PA-C  Department of Hospital Medicine   [facility]

## 2019-08-05 NOTE — NURSING
Unable to do full assessment at this time. Patient is refusing to answer questions and is cursing at me. Will continue to monitor patient's condition.

## 2019-08-05 NOTE — HOSPITAL COURSE
"Hamlet Terrell is a 53 y.o. M who was admitted for hypokalemia and gout flare.  Patient refused potassium replacement and stated "I have potassium at home I can take" and left AMA.    "

## 2019-08-05 NOTE — ASSESSMENT & PLAN NOTE
Subtherapeutic international normalized ratio (INR)  Personal history of venous thrombosis and embolism  Atrial fibrillation, chronic    - INR 1.2; h/o noncompliance with warfarin (goal 2-3).  Will restart lovenox bridge but has refused coumadin and lovenox bridge in the past.  Has previously refused starting NOAC.   - c/w warfarin 10 mg PO daily; check INR daily  - c/w metoprolol tartate 25 mg Q6 hours  - Goal K 4-5 and Mg 2-3; he occasionally refuses despite discussion of risks

## 2019-08-05 NOTE — NURSING
"I entered the patient's room to admininster ordered medications. I told the patient, "Mr. Terrell, I have some medications for you." To this the patient stated, "I don't want that shit, I'm tired of being fucking poked, I want to go home." I attempted to explain to the patient that I was only giving him his IV potassium, po potassium, and bidil as ordered; I wasn't going to stick him. Before I could explain, he stated, "I don't want it, I'm going home." I told him that because he is in his right mind and is of age, I could not keep him; I informed him that I had to contact the provider and that I had to get him to sign some paperwork. I then asked him if he had anyone who could come get him. To this he stated, "No, they all sleep they gotta go to work in the morning." I explained to him that if he left AMA, he would have to leave the room. To this he stated, "I'll call a fucking cab." LILIANA Dent, charge RN and I both explained to him the dangers and the side effects of a low blood potassium; to this the patient replied, "I got potassium pills at home; I can take those. I'm tired of being in this bitch, I want to leave. I just left here July first, and now I'm back. I'm tired, I'm frustrated, I want to go." I informed the patient that I would let the provider know and that I would be back shortly.  "

## 2019-08-05 NOTE — ASSESSMENT & PLAN NOTE
Difficulty during previous hospital stays for treatment plan noncompliance and refusing certain interventions.  Patient has poor insight.  - needs outpatient psych f/u  - avoid opiates

## 2019-08-05 NOTE — PROGRESS NOTES
INR from 8/2 unable to be addressed. He was in ER 8/4 with a current INR. See 8/5 encounter. Closing this encounter for admin purposes. See previous encounter for recent notes as well

## 2019-08-05 NOTE — NURSING
"I entered the patient's room to remove his IV and to give him AMA paperwork to sign. Patient signed the form and allowed me to remove his IV. I then stated, "If you are not feeling better or begin to feel worse, please do not hesitate to seek treatment; come back to the hospital at anytime." To this the patient stated, "Fuck that shit, I'm not coming back to this fucking place ever again."   "

## 2019-08-05 NOTE — SIGNIFICANT EVENT
"Nursing called to inform provider that patient was refusing medications stating "I am not taking that shit". Patient refusing potassium supplementation and "does not give a f**k" stating he has potassium at home, he will take his own. Nursing informed patient of risks associated with low potassium and patient still refusing. Patient has a history of noncompliance on multiple visits to the hospital, and appears to again be exhibiting similar behavior.   "

## 2019-08-05 NOTE — NURSING
"Spoke with SUSY Sims and notified her that patient is refusing all medications and that he wishes to leave AMA. To this she stated, "That's fine, he can leave."   "

## 2019-08-05 NOTE — ASSESSMENT & PLAN NOTE
Acute gout of left hand  - has a hx of elevated uric acid but denies any hx of gout. Will need OP f/u with Rheum  - kenalog depo IM x1; should start daily allopurinol (refer to PCP/Rheum)  - /CRP 63.7  - Uric acid 14.7  - check RF, Anti CCP IGg

## 2019-08-05 NOTE — ED TRIAGE NOTES
Hamlet Terrell, a 53 y.o. male presents to the ED w/ complaint of left elbow pain, finger, and elbow pain that started 8/1 after leaving the therapist. Pt denies any trauma or injury to the left arm recently. 10/10 pain. Capillary refill <3 sec and radial pulse +2 in upper left extremity. No other complaints at this time.     Triage note:  Chief Complaint   Patient presents with    finger and elbow pain     Review of patient's allergies indicates:   Allergen Reactions    Acetaminophen      Itching    Oxycodone-acetaminophen      Other reaction(s): Itching    Ace inhibitors Other (See Comments)     cough     Past Medical History:   Diagnosis Date    Anticoagulant long-term use     Cardiomegaly     Chronic combined systolic and diastolic congestive heart failure     Coronary artery disease     Heart attack 2006    Hypertension     Hyperthyroidism, subclinical 1/2/2013    MI (myocardial infarction) 9/22/2013    MI in 2009      Paroxysmal atrial fibrillation     PE (pulmonary embolism) 1/1/2013    IN 2010     S/P ablation of atrial flutter 2008    Stroke 2009    no residual weaknesses

## 2019-08-05 NOTE — ED PROVIDER NOTES
Encounter Date: 8/4/2019    SCRIBE #1 NOTE: I, Denisse Hickey , am scribing for, and in the presence of,  Dr. Buckley. I have scribed the following portions of the note - the EKG reading.       History     Chief Complaint   Patient presents with    finger and elbow pain     This is a 53 year old male with a PMH of HTN, heart failure, CAD, Afib anticoagulated on warfarin, CVA, gout presenting to the ED with a chief complaint of joint pain. Patient reports painless swelling to the left elbow. Secondary complaint is of pain and swelling to the left 3rd PIP joint. He reports symptoms began following discharge from rehab facility. The symptoms are constant since the onset. He endorses 10/10 pain associated with this. Denies a hx of gout although I reviewed his discharge summary and he was treated with a 10 day prednisone taper for gout in the right hand during his most recent admission. He denies fever, chills, nausea/vomiting, weakness or rashes.         Review of patient's allergies indicates:   Allergen Reactions    Acetaminophen      Itching    Oxycodone-acetaminophen      Other reaction(s): Itching    Ace inhibitors Other (See Comments)     cough     Past Medical History:   Diagnosis Date    Anticoagulant long-term use     Cardiomegaly     Chronic combined systolic and diastolic congestive heart failure     Coronary artery disease     Heart attack 2006    Hypertension     Hyperthyroidism, subclinical 1/2/2013    MI (myocardial infarction) 9/22/2013    MI in 2009      Paroxysmal atrial fibrillation     PE (pulmonary embolism) 1/1/2013    IN 2010     S/P ablation of atrial flutter 2008    Stroke 2009    no residual weaknesses     Past Surgical History:   Procedure Laterality Date    RADIOFREQUENCY ABLATION  01/08/2008    for atrial flutter     Family History   Problem Relation Age of Onset    Hypertension Mother     Stroke Mother     Hypertension Father     Alcohol abuse Father     Hypertension  Sister     Hypertension Brother      Social History     Tobacco Use    Smoking status: Never Smoker    Smokeless tobacco: Never Used   Substance Use Topics    Alcohol use: No    Drug use: No     Review of Systems   Constitutional: Negative for chills and fever.   HENT: Negative for sore throat.    Respiratory: Negative for shortness of breath.    Cardiovascular: Negative for chest pain.   Gastrointestinal: Negative for nausea and vomiting.   Genitourinary: Negative for dysuria.   Musculoskeletal: Positive for arthralgias and joint swelling. Negative for back pain.   Skin: Negative for rash.   Neurological: Negative for weakness.   Hematological: Does not bruise/bleed easily.       Physical Exam     Initial Vitals [08/04/19 1825]   BP Pulse Resp Temp SpO2   136/61 89 18 98.4 °F (36.9 °C) 98 %      MAP       --         Physical Exam    Constitutional: He appears well-developed and well-nourished. No distress.   Chronically ill appearing, appears older than stated age.   HENT:   Head: Atraumatic.   Eyes: Conjunctivae and EOM are normal. Pupils are equal, round, and reactive to light.   Cardiovascular: Normal rate, regular rhythm and normal heart sounds.   Pulmonary/Chest: Breath sounds normal. No respiratory distress. He has no wheezes. He has no rhonchi. He has no rales.   Abdominal: Soft. Bowel sounds are normal. There is no tenderness. There is no rebound and no guarding.   Musculoskeletal:        Left elbow: He exhibits swelling (olecranon bursa). He exhibits normal range of motion. No tenderness found. No olecranon process tenderness noted.        Left hand: He exhibits tenderness (3rd PIP joint). He exhibits normal range of motion.   Neurological: He is alert and oriented to person, place, and time.   Skin: Skin is warm and dry. No rash noted.         ED Course   Procedures  Labs Reviewed   CBC W/ AUTO DIFFERENTIAL - Abnormal; Notable for the following components:       Result Value    RBC 2.79 (*)      Hemoglobin 7.3 (*)     Hematocrit 24.8 (*)     Mean Corpuscular Hemoglobin 26.2 (*)     Mean Corpuscular Hemoglobin Conc 29.4 (*)     RDW 21.7 (*)     Mono # 1.2 (*)     Lymph% 12.6 (*)     Mono% 15.3 (*)     All other components within normal limits   COMPREHENSIVE METABOLIC PANEL - Abnormal; Notable for the following components:    Potassium 3.0 (*)     Glucose 165 (*)     BUN, Bld 48 (*)     Creatinine 1.7 (*)     Albumin 3.3 (*)     Total Bilirubin 1.5 (*)     Alkaline Phosphatase 216 (*)     eGFR if  52.4 (*)     eGFR if non  45.4 (*)     All other components within normal limits   SEDIMENTATION RATE - Abnormal; Notable for the following components:    Sed Rate 117 (*)     All other components within normal limits   C-REACTIVE PROTEIN - Abnormal; Notable for the following components:    CRP 63.7 (*)     All other components within normal limits   ISTAT PROCEDURE - Abnormal; Notable for the following components:    POC Glucose 172 (*)     POC BUN 45 (*)     POC Creatinine 1.6 (*)     POC Sodium 135 (*)     POC Potassium 2.8 (*)     POC Chloride 94 (*)     POC Ionized Calcium 1.05 (*)     POC Hematocrit 24 (*)     All other components within normal limits   MAGNESIUM   PHOSPHORUS   PROTIME-INR     EKG Readings: (Independently Interpreted)   Sinus rhythm with PACs, Left axis deviation and a LBBB which is present on prior 7/17/19. Rate of 86 bpm. . . Morphology grossly similar to 7/17/19.        ECG Results          EKG 12-lead (Final result)  Result time 08/05/19 17:12:49    Final result by Interface, Lab In [facility] (08/05/19 17:12:49)                 Narrative:    Test Reason : E87.6,    Vent. Rate : 086 BPM     Atrial Rate : 084 BPM     P-R Int : 000 ms          QRS Dur : 186 ms      QT Int : 460 ms       P-R-T Axes : -18 -36 149 degrees     QTc Int : 550 ms    Atrial fibrillation  Left axis deviation  Right bundle branch block  T wave abnormality, consider lateral  ischemia  Abnormal ECG  When compared with ECG of 17-JUL-2019 00:45,  Right bundle branch block is now Present  Confirmed by MEGAN ARGUELLES MD ([id number]) on 8/5/2019 5:12:45 PM    Referred By: JANES   SELF           Confirmed By:MEGAN ARGUELLES MD                            Imaging Results    None          Medical Decision Making:   History:   Old Medical Records: I decided to obtain old medical records.  Old Records Summarized: records from clinic visits and records from previous admission(s).       <> Summary of Records: Hospitalized 7/2-7/18 for acute on chronic combined heart failure and JEAN-PIERRE.   Clinical Tests:   Lab Tests: Ordered and Reviewed  Medical Tests: Ordered and Reviewed       APC / Resident Notes:   53 year old male presenting with joint pain.  On exam he is afebrile and nontoxic. Left hand with swelling, tenderness and decreased ROM of the 3rd PIP joint. Left olecranon swelling with no superimposed erythema or tenderness to palpation.    DDx includes but is not limited to gouty arthritis, septic arthritis, cellulitis, bursitis.    istat chem 8 K 2.8, repeat on CMP 3.0  H&H 7.3/24, WBC 7.8, Cr 1.7, T bili 1.5    Plan for admission to replete potassium.  Pt refusing IV, will give oral replacement and observe overnight.    Patient states his home health nurse discontinued potassium supplementation on 8/1/19 as it was not listed in his MAR. He reports home dose of 25meq once daily prior.    Kenalog 40mg IM given to tx gouty arthritis.    I discussed the care of this patient with my supervising MD.          Scribe Attestation:   Scribe #1: I performed the above scribed service and the documentation accurately describes the services I performed. I attest to the accuracy of the note.    Attending Attestation:     Physician Attestation Statement for NP/PA:   I discussed this assessment and plan of this patient with the NP/PA, but I did not personally examine the patient. The face to face encounter was  performed by the NP/PA.                     Clinical Impression:       ICD-10-CM ICD-9-CM   1. Hypokalemia E87.6 276.8   2. Olecranon bursitis, left elbow M70.22 726.33   3. Pain involving joint of finger M25.549 719.44   4. Acute gout of left hand, unspecified cause M10.9 274.01   5. Diuretic-induced hypokalemia E87.6 276.8    T50.2X5A E944.4   6. Acute idiopathic gout of left elbow M10.022 274.01   7. Atrial fibrillation, chronic I48.2 427.31   8. Chronic anticoagulation Z79.01 V58.61   9. Chronic combined systolic and diastolic CHF (congestive heart failure) I50.42 428.42     428.0   10. Essential hypertension I10 401.9   11. History of CVA (cerebrovascular accident) Z86.73 V12.54   12. Noncompliance with diet and medication regimen Z91.11 V15.81    Z91.14    13. Stage 3 chronic kidney disease N18.3 585.3   14. Subtherapeutic international normalized ratio (INR) R79.1 790.92         Disposition:   Disposition: Placed in Observation  Condition: Stable                        Amber Corona PA-C  08/05/19 0043       Rosana Buckley MD  08/12/19 0013

## 2019-08-05 NOTE — HPI
"Hamlet Terrell is a 53 y.o. M with pmhx combined CHF (last Echo 4/2019 with EF 23%, diastolic dysfuction, moderated MR and TR), HTN, chronic A. fib (on coumadin), A. flutter s/p ablation, CVA (in 2009), CAD, ?PE (patient reported), non-compliance, and drug seeking behavior who presents to the ED for evaluation of painless L elbow/L third PIP swelling/tingling x4 days.  Patient is poor historian and has poor medical insight.  Pain incited with ROM and palpation.  Patient denies any trauma or prior injury.  Patient reports previous gout attack last month with no improvement in sxs (to R RF PIP) after treatment..  He denies a hx of gout prior to last month and has never seen a rheumatologist.  He denies any fever, chills, sweats, NVD, dizziness.  He reports a nonspecific dry cough x3 days with a few events of productive sputum "with blood" and recent epistaxis (x3 days) that resolved yesterday.  He denies any recent red meat, beer, or sugary drinks.  Eats baked fish less than once a month.  Reports compliance with home medications but reports recently taken off of potassium bicarbonate 25 mEq daily (uncertain when last dose was).  Also reports taking 12.5 mg warfarin daily.     Of note, admitted 7/2-7/18 for acute heart failure. R RF PIP with edema and uric acid was 16.6- initially treated with colchicine.  Ortho was consulted and arthrocentesis performed 7/16 (cultures negative).  Started on prednisone x10 days (end date: 7/29) and referred to ortho.  Also of note, was c/o R sided pain/weakness during last admission. He underwent CTH, MRI C/T/L spine and R shoulder.  Ortho performed R shoulder arthrocentesis (cultures negative).  Neuro consulted and discharged to [facility].      ED:  Afebrile, no leukocytosis, HDS.  Hg improved from previous.  K 3.0.  ER admitted patient for hypokalemia d/t concern of chronic high dose lasix use.   "

## 2019-08-05 NOTE — SUBJECTIVE & OBJECTIVE
Past Medical History:   Diagnosis Date    Anticoagulant long-term use     Cardiomegaly     Chronic combined systolic and diastolic congestive heart failure     Coronary artery disease     Heart attack 2006    Hypertension     Hyperthyroidism, subclinical 1/2/2013    MI (myocardial infarction) 9/22/2013    MI in 2009      Paroxysmal atrial fibrillation     PE (pulmonary embolism) 1/1/2013    IN 2010     S/P ablation of atrial flutter 2008    Stroke 2009    no residual weaknesses       Past Surgical History:   Procedure Laterality Date    RADIOFREQUENCY ABLATION  01/08/2008    for atrial flutter       Review of patient's allergies indicates:   Allergen Reactions    Acetaminophen      Itching    Oxycodone-acetaminophen      Other reaction(s): Itching    Ace inhibitors Other (See Comments)     cough       Current Facility-Administered Medications on File Prior to Encounter   Medication    [DISCONTINUED] gabapentin capsule    [DISCONTINUED] GENERIC EXTERNAL MEDICATION    [DISCONTINUED] GENERIC EXTERNAL MEDICATION    [DISCONTINUED] metOLazone tablet     Current Outpatient Medications on File Prior to Encounter   Medication Sig    albuterol (PROVENTIL/VENTOLIN HFA) 90 mcg/actuation inhaler Inhale 1-2 puffs into the lungs every 6 (six) hours as needed for Wheezing. Rescue    aspirin (ECOTRIN) 81 MG EC tablet Take 81 mg by mouth once daily.    calcium carbonate (TUMS) 200 mg calcium (500 mg) chewable tablet Take 1 tablet (500 mg total) by mouth 3 (three) times daily as needed.    ferrous sulfate 325 (65 FE) MG EC tablet Take 1 tablet (325 mg total) by mouth once daily.    furosemide (LASIX) 80 MG tablet Take 1 tablet (80 mg total) by mouth 2 (two) times daily.    gabapentin (NEURONTIN) 250 mg/5 mL (5 mL) solution Take 2 mLs (100 mg total) by mouth 2 (two) times daily.    isosorbide-hydrALAZINE 20-37.5 mg (BIDIL) 20-37.5 mg Tab Take 1 tablet by mouth 2 (two) times daily.    methyl  salicylate-menthol 15-10% 15-10 % Crea Apply topically 3 (three) times daily.    metoprolol tartrate (LOPRESSOR) 25 MG tablet Take 1 tablet (25 mg total) by mouth every 6 (six) hours.    nitroGLYCERIN (NITROSTAT) 0.4 MG SL tablet Place 1 tablet (0.4 mg total) under the tongue every 5 (five) minutes as needed for Chest pain. Tablet, Sublingual Sublingual    pantoprazole (PROTONIX) 40 MG tablet Take 1 tablet (40 mg total) by mouth once daily.    polyethylene glycol (GLYCOLAX) 17 gram PwPk Take 17 g by mouth 2 (two) times daily.    ramelteon (ROZEREM) 8 mg tablet Take 1 tablet (8 mg total) by mouth nightly as needed for Insomnia.    senna-docusate 8.6-50 mg (PERICOLACE) 8.6-50 mg per tablet Take 1 tablet by mouth 2 (two) times daily.    warfarin (COUMADIN) 4 MG tablet Take 2 tablets (8 mg total) by mouth Daily.     Family History     Problem Relation (Age of Onset)    Alcohol abuse Father    Hypertension Mother, Father, Sister, Brother    Stroke Mother        Tobacco Use    Smoking status: Never Smoker    Smokeless tobacco: Never Used   Substance and Sexual Activity    Alcohol use: No    Drug use: No    Sexual activity: Never     Review of Systems   Constitutional: Negative for chills, fatigue, fever and unexpected weight change.   HENT: Negative for ear pain and sore throat.    Eyes: Negative for pain and visual disturbance.   Respiratory: Positive for cough. Negative for shortness of breath.    Cardiovascular: Negative for chest pain and palpitations.   Gastrointestinal: Negative for abdominal pain, diarrhea, nausea and vomiting.   Endocrine: Negative for heat intolerance and polydipsia.   Genitourinary: Negative for dysuria, frequency and urgency.   Musculoskeletal: Positive for joint swelling. Negative for back pain and gait problem.        R ring finger pain to palpation  L elbow, L middle finger swelling, pain with ROM   Skin: Negative for rash and wound.   Neurological: Negative for dizziness and  weakness.   Psychiatric/Behavioral: Negative for confusion. The patient is not nervous/anxious.      Objective:     Vital Signs (Most Recent):  Temp: 98.6 °F (37 °C) (08/05/19 0032)  Pulse: 80 (08/05/19 0032)  Resp: 17 (08/05/19 0032)  BP: 127/60 (08/05/19 0032)  SpO2: 98 % (08/05/19 0032) Vital Signs (24h Range):  Temp:  [98.4 °F (36.9 °C)-98.8 °F (37.1 °C)] 98.6 °F (37 °C)  Pulse:  [71-89] 80  Resp:  [17-18] 17  SpO2:  [98 %-100 %] 98 %  BP: (121-136)/(58-61) 127/60     Weight: 105.2 kg (232 lb)  Body mass index is 34.26 kg/m².    Physical Exam   Constitutional: He is oriented to person, place, and time. He appears well-developed and well-nourished.   Non-toxic, chronically ill appearing male in NAD    HENT:   Head: Normocephalic and atraumatic.   Eyes: Pupils are equal, round, and reactive to light. Conjunctivae and EOM are normal.   Neck: Normal range of motion. Neck supple.   Cardiovascular: Normal rate, normal heart sounds and intact distal pulses. An irregularly irregular rhythm present.   Pulmonary/Chest: Effort normal and breath sounds normal. He has no wheezes. He has no rales.   No wheezing, rales, or abnormal lung sounds  No evidence of respiratory distress   Abdominal: Soft. Bowel sounds are normal. He exhibits no distension. There is no tenderness.   Musculoskeletal: Normal range of motion. He exhibits edema and tenderness. He exhibits no deformity.   L 3rd digit PIP, L olecranon process/bursa with mild warmth to joints and pain with ROM  TTP to R middle digit PIP   Neurological: He is alert and oriented to person, place, and time.   Skin: Skin is warm and dry.   Chronic appearing, woody color to BLE with hyperkeratosis and non-pitting edema below BL knees   Psychiatric: He has a normal mood and affect. His behavior is normal. Judgment and thought content normal.   Poor medical insight  Disconcertingly michael in speech   Nursing note and vitals reviewed.        CRANIAL NERVES     CN III, IV, VI   Pupils  are equal, round, and reactive to light.  Extraocular motions are normal.        Significant Labs:   CBC:   Recent Labs   Lab 08/04/19 2025 08/04/19 2049   WBC  --  7.80   HGB  --  7.3*   HCT 24* 24.8*   PLT  --  163     CMP:   Recent Labs   Lab 08/04/19 2049      K 3.0*   CL 96   CO2 26   *   BUN 48*   CREATININE 1.7*   CALCIUM 9.1   PROT 8.0   ALBUMIN 3.3*   BILITOT 1.5*   ALKPHOS 216*   AST 30   ALT 20   ANIONGAP 15   EGFRNONAA 45.4*       Significant Imaging: I have reviewed all pertinent imaging results/findings within the past 24 hours.

## 2019-08-05 NOTE — ASSESSMENT & PLAN NOTE
Euvolemic  - c/w lasix 80 mg PO BID; hold tonight's dose (8/4) d/t hypokalemia  - strict I/O, daily weights, tele  - well-known to cardiology service

## 2019-08-05 NOTE — ED NOTES
Patient informed that PA is coming to bedside to discuss plan of care. Pt refusing to be moved into consult room for privacy.

## 2019-08-05 NOTE — ED NOTES
PA at chair side. Pt still refusing to move into consult room to discuss results and plan of care privately.

## 2019-08-05 NOTE — ASSESSMENT & PLAN NOTE
K 3.0 while on lasix 80 mg PO BID  - replete IV and PO  - hold evening lasix dose as euvolemic  - likely discharge home with 25 mEq of potassium bicarbonate PO daily and f/u with PCP  - monitor on tele  Left AMA 3 hours after being admitted

## 2019-08-05 NOTE — ASSESSMENT & PLAN NOTE
K 3.0 while on lasix 80 mg PO BID  - replete IV and PO  - hold evening lasix dose as euvolemic  - likely discharge home with 25 mEq of potassium bicarbonate PO daily and f/u with PCP  - monitor on tele

## 2019-08-05 NOTE — NURSING
"Patient has arrived on the unit via wheelchair. Transport personnel approached the desk and stated, "Who is the nurse for 84?" I told her that it was me. To this she stated, "He won't get in the bed; I asked him why, and he said, 'I'm not getting in that shit' what do you want me to do?" I told her that this was fine, we can't make him get in the bed.  "

## 2019-08-05 NOTE — ASSESSMENT & PLAN NOTE
Acute gout of left hand  - has a hx of elevated uric acid but denies any hx of gout. Will need OP f/u with Rheum  - kenalog depo IM x1; should start daily allopurinol (refer to PCP/Rheum)  - /CRP 63.7  - check uric acid  - check RF, Anti CCP IGg

## 2019-08-05 NOTE — PROGRESS NOTES
Emergency Department on 8/4/19 and patient left AMA early morning of 8/5/19 with no INR resulted;  admitted 7/3/19 - 7/18/19 transferred to SNF when he was discharged on 8/1/19.  He was readmitted to [facility]. I spoke to the patient today and he is completely out of Coumadin at home and did not  his refill prescription at his pharmacy and his last dose of Coumadin was on 8/1/19 when he was discharged from SNF.    I instructed him to  Coumadin to today and start taking 1 tab daily after 5 pm, to call me back to verify he started taking the Coumadin so an order can be sent to , and he must make an appointment with an [facility] PCP to remain in Coumadin Clinic so gave him phone number to [facility] which he verbalized understanding.      I called patient back and left instructions on his voice message to take 2 tab of Coumadin daily after 5 pm and his next INR is 8/8/19.  I faxed order to [facility].  Chart routed to medical assistant to keep trying to reach patient to advise.    8/6/19  I left voice message for patient to call back to  confirm if he picked up his refill yesterday to start taking 2 tabs daily on Monday 8/5 so we can schedule HH and send order for 8/8.  Patient not calling back after multiple message left so I faxed order to [facility] to do INR on 8/8/19 in the event the patient did start taking Coumadin as instructed.

## 2019-08-05 NOTE — NURSING
"I transported the patient off the unit via wheelchair to the emergency room exit at his request. When we got there, the cab was leaving. Patient stated that he would "Call them (referring to cab company) again."  "

## 2019-08-08 ENCOUNTER — ANTI-COAG VISIT (OUTPATIENT)
Dept: CARDIOLOGY | Facility: CLINIC | Age: 54
End: 2019-08-08

## 2019-08-08 DIAGNOSIS — I50.42 CHRONIC COMBINED SYSTOLIC AND DIASTOLIC CHF (CONGESTIVE HEART FAILURE): ICD-10-CM

## 2019-08-08 DIAGNOSIS — I48.20 ATRIAL FIBRILLATION, CHRONIC: ICD-10-CM

## 2019-08-08 DIAGNOSIS — M54.50 LUMBAR BACK PAIN: ICD-10-CM

## 2019-08-08 DIAGNOSIS — R79.1 ELEVATED INR: ICD-10-CM

## 2019-08-08 DIAGNOSIS — Z79.01 CHRONIC ANTICOAGULATION: ICD-10-CM

## 2019-08-08 LAB — INR PPP: 1.1

## 2019-08-09 NOTE — PROGRESS NOTES
8/9/19 - patient not able to be reached regarding 8/8 INR. Will to continue trying to reach him and will likely need updated INR asap. Closing chart for admin purposes.

## 2019-08-13 NOTE — PROGRESS NOTES
Called Mary Jane Kaur with [employer] and asked to get an INR drawn at next SN visit -8/15 and to have nurse call while still at Patient's home, Order faxed to [facility]

## 2019-08-13 NOTE — PROGRESS NOTES
Please have HH check INR with their visit 8/15. Please ask nurse to call us while she is there since we can't get in touch with him. In the meantime, continue to try and reach patient

## 2019-08-13 NOTE — PROGRESS NOTES
Mary Jane Kaur with [employer] called to check on Patient's next lab order--states has not received any further orders regarding 8/08 lab result, told Mary Jane Kaur we have no further orders due to not being able to reach Patient. Called Patient and left message at patient's # to call coumadin clinic, also left message at Kris's # to call or have Patient call coumadin clinic. Patient is due for another nurse visit 8/15/19

## 2019-08-15 ENCOUNTER — ANTI-COAG VISIT (OUTPATIENT)
Dept: CARDIOLOGY | Facility: CLINIC | Age: 54
End: 2019-08-15

## 2019-08-15 DIAGNOSIS — I48.20 ATRIAL FIBRILLATION, CHRONIC: ICD-10-CM

## 2019-08-15 DIAGNOSIS — Z79.01 CHRONIC ANTICOAGULATION: ICD-10-CM

## 2019-08-15 LAB — INR PPP: 1.2

## 2019-08-15 NOTE — PROGRESS NOTES
Patient was not reached regarding 8/08 INR result, home health nurse (Libia) reyna another INR today 8/15/19 and gave a verbal result as: INR -1.2 but was not at Patient's home anymore, reports that the patient stated he has been taking coumadin: 9mg daily (using 1mg tablets), Libia can be reached at [phone] if patient is not reachable

## 2019-08-16 ENCOUNTER — TELEPHONE (OUTPATIENT)
Dept: HOME HEALTH SERVICES | Facility: HOSPITAL | Age: 54
End: 2019-08-16

## 2019-08-19 NOTE — PROGRESS NOTES
8/19/19 - unable to reach patient regarding 8/15 INR. Closing chart for administrative purposes and will continue trying to contact patient. Will need updated INR asap.

## 2019-08-20 ENCOUNTER — ANTI-COAG VISIT (OUTPATIENT)
Dept: CARDIOLOGY | Facility: CLINIC | Age: 54
End: 2019-08-20
Payer: MEDICAID

## 2019-08-20 DIAGNOSIS — I48.20 ATRIAL FIBRILLATION, CHRONIC: ICD-10-CM

## 2019-08-20 DIAGNOSIS — Z79.01 CHRONIC ANTICOAGULATION: Primary | ICD-10-CM

## 2019-08-20 LAB
FUNGUS SPEC CULT: NORMAL
INR PPP: 1.4

## 2019-08-20 PROCEDURE — 93793 ANTICOAG MGMT PT WARFARIN: CPT | Mod: ,,,

## 2019-08-20 PROCEDURE — 93793 PR ANTICOAGULANT MGMT FOR PT TAKING WARFARIN: ICD-10-PCS | Mod: ,,,

## 2019-08-22 RX ORDER — WARFARIN 4 MG/1
8 TABLET ORAL DAILY
Qty: 60 TABLET | Refills: 0 | Status: ON HOLD | OUTPATIENT
Start: 2019-08-22 | End: 2019-10-25 | Stop reason: SDUPTHER

## 2019-08-22 NOTE — PROGRESS NOTES
INR from 2 days ago and he missed his coumadin since then. He reports no refills but according to medlist he should have Rx on file. Will send in 1 month supply to [facility] of 4mg tablets. He will not be able to continue in clinic if he does not get established with a PCP or cardiologist here. He will need to arrange for transportation**

## 2019-08-28 ENCOUNTER — TELEPHONE (OUTPATIENT)
Dept: HOME HEALTH SERVICES | Facility: HOSPITAL | Age: 54
End: 2019-08-28

## 2019-09-05 ENCOUNTER — EXTERNAL HOME HEALTH (OUTPATIENT)
Dept: HOME HEALTH SERVICES | Facility: HOSPITAL | Age: 54
End: 2019-09-05
Payer: MEDICAID

## 2019-09-05 LAB
ACID FAST MOD KINY STN SPEC: NORMAL
MYCOBACTERIUM SPEC QL CULT: NORMAL

## 2019-09-05 RX ORDER — METOPROLOL TARTRATE 50 MG/1
TABLET ORAL
Qty: 60 TABLET | Refills: 0 | OUTPATIENT
Start: 2019-09-05

## 2019-09-05 RX ORDER — GABAPENTIN 300 MG/1
CAPSULE ORAL
Qty: 30 CAPSULE | Refills: 0 | OUTPATIENT
Start: 2019-09-05

## 2019-09-09 ENCOUNTER — HOSPITAL ENCOUNTER (EMERGENCY)
Facility: HOSPITAL | Age: 54
Discharge: HOME | End: 2019-09-09
Attending: EMERGENCY MEDICINE
Payer: MEDICAID

## 2019-09-09 VITALS
HEIGHT: 69 IN | BODY MASS INDEX: 35.55 KG/M2 | DIASTOLIC BLOOD PRESSURE: 68 MMHG | HEART RATE: 81 BPM | SYSTOLIC BLOOD PRESSURE: 117 MMHG | RESPIRATION RATE: 18 BRPM | TEMPERATURE: 98 F | OXYGEN SATURATION: 96 % | WEIGHT: 240 LBS

## 2019-09-09 DIAGNOSIS — R04.0 EPISTAXIS: Primary | ICD-10-CM

## 2019-09-09 DIAGNOSIS — Z79.01 ANTICOAGULATED ON COUMADIN: ICD-10-CM

## 2019-09-09 LAB
INR PPP: 2.4 (ref 0.8–1.2)
PROTHROMBIN TIME: 23.4 SEC (ref 9–12.5)

## 2019-09-09 PROCEDURE — 99284 PR EMERGENCY DEPT VISIT,LEVEL IV: ICD-10-PCS | Mod: ,,, | Performed by: PHYSICIAN ASSISTANT

## 2019-09-09 PROCEDURE — 85610 PROTHROMBIN TIME: CPT

## 2019-09-09 PROCEDURE — 99284 EMERGENCY DEPT VISIT MOD MDM: CPT | Mod: ,,, | Performed by: PHYSICIAN ASSISTANT

## 2019-09-09 PROCEDURE — 99283 EMERGENCY DEPT VISIT LOW MDM: CPT

## 2019-09-10 NOTE — ED PROVIDER NOTES
"Encounter Date: 9/9/2019       History     Chief Complaint   Patient presents with    Epistaxis     nosebleed few hours ago. bleeding controlled in route here. takes bloodthinners.     53 year old male with medical history of HTN, CHF, PAFib on Coumadin, CAD presenting to the ED with the chief complaint of epistaxis. Patient reports developing epistaxis out his right nostril 1.5 hours ago and had it resolved 20 minutes ago while driving to the ED. Patient reports prior to onset of epistaxis he was feeling congested and stuck a Qtip up his right nostril and dislodged a "blood clot." He denies digital trauma or other recent injuries. He reports compliancy with Coumadin. He denies fever, headache, vision changes, speech changes, chest pain, SOB, cough, abdominal pain, nausea, vomiting, dysuria, hematuria, diarrhea, melena, hematochezia.         Review of patient's allergies indicates:   Allergen Reactions    Acetaminophen      Itching    Oxycodone-acetaminophen      Other reaction(s): Itching    Ace inhibitors Other (See Comments)     cough     Past Medical History:   Diagnosis Date    Anticoagulant long-term use     Cardiomegaly     Chronic combined systolic and diastolic congestive heart failure     Coronary artery disease     Heart attack 2006    Hypertension     Hyperthyroidism, subclinical 1/2/2013    MI (myocardial infarction) 9/22/2013    MI in 2009      Paroxysmal atrial fibrillation     PE (pulmonary embolism) 1/1/2013    IN 2010     S/P ablation of atrial flutter 2008    Stroke 2009    no residual weaknesses     Past Surgical History:   Procedure Laterality Date    RADIOFREQUENCY ABLATION  01/08/2008    for atrial flutter     Family History   Problem Relation Age of Onset    Hypertension Mother     Stroke Mother     Hypertension Father     Alcohol abuse Father     Hypertension Sister     Hypertension Brother      Social History     Tobacco Use    Smoking status: Never Smoker    " Smokeless tobacco: Never Used   Substance Use Topics    Alcohol use: No    Drug use: No     Review of Systems   Constitutional: Negative for chills, diaphoresis and fever.   HENT: Positive for congestion and nosebleeds. Negative for sore throat.    Eyes: Negative for pain and redness.   Respiratory: Negative for shortness of breath.    Cardiovascular: Negative for chest pain.   Gastrointestinal: Negative for abdominal pain, blood in stool, nausea and vomiting.   Genitourinary: Negative for dysuria and hematuria.   Musculoskeletal: Negative for back pain.   Skin: Negative for rash and wound.   Neurological: Negative for weakness.   Hematological: Does not bruise/bleed easily.       Physical Exam     Initial Vitals [09/09/19 1906]   BP Pulse Resp Temp SpO2   117/68 81 18 98.3 °F (36.8 °C) 96 %      MAP       --         Physical Exam    Constitutional: He appears well-developed and well-nourished. He is not diaphoretic. No distress.   HENT:   Head: Normocephalic and atraumatic.   Mouth/Throat: Oropharynx is clear and moist. No oropharyngeal exudate.   Dried blood overlying R Kesselbach's plexus. Epistaxis resolved. No L nostril or posterior pharynx involvement  No maxillary or frontal sinus tenderness   Eyes: EOM are normal. Pupils are equal, round, and reactive to light.   Neck: Normal range of motion. Neck supple.   Cardiovascular: Normal rate, regular rhythm and intact distal pulses.   Pulmonary/Chest: Breath sounds normal. No respiratory distress. He has no wheezes.   Speaking full sentences without difficulty   Abdominal: Soft. He exhibits no distension. There is no tenderness.   Musculoskeletal: Normal range of motion. He exhibits no edema or tenderness.   Neurological: He is alert and oriented to person, place, and time. He has normal strength. No cranial nerve deficit or sensory deficit.   Skin: Skin is warm and dry. No rash noted. No erythema.       ED Course   Procedures  Labs Reviewed   PROTIME-INR -  Abnormal; Notable for the following components:       Result Value    Prothrombin Time 23.4 (*)     INR 2.4 (*)     All other components within normal limits          Imaging Results    None          Medical Decision Making:   History:   Old Medical Records: I decided to obtain old medical records.  Old Records Summarized: records from clinic visits.  Clinical Tests:   Lab Tests: Reviewed and Ordered       APC / Resident Notes:   53 year old male with medical history of HTN, CHF, PAFib on Coumadin, CAD presenting to the ED c/o R nostril epistaxis beginning today. Resolved during transit to ED. DDx includes but not limited to digital trauma, facial trauma, sinusitis, medication side effect, supra-therapeutic INR. Will check INR and monitor in the ED.     INR 2.4. No reoccurrence of epistaxis during ED stay. Patient stable for outpatient therapy. Education regarding pressure holding given to patient for epistaxis management. Advised patient to use Afrin additionally and patient states he has Afrin at home and does not require a prescription. Advised outpatient follow-up with PCP within 1 week. Patient expresses understanding and agreeable to the plan. Return to ED precautions given for new, worsening, or concerning symptoms. I have discussed the care of this patient with my supervising physician.          Attending Attestation:     Physician Attestation Statement for NP/PA:   I discussed this assessment and plan of this patient with the NP/PA, but I did not personally examine the patient. The face to face encounter was performed by the NP/PA.                     Clinical Impression:       ICD-10-CM ICD-9-CM   1. Epistaxis R04.0 784.7   2. Anticoagulated on Coumadin Z51.81 V58.83    Z79.01 V58.61         Disposition:   Disposition: Discharged  Condition: Stable                        Zeb Chiang PA-C  09/10/19 0042       Perry Underwood MD  09/11/19 1154

## 2019-09-10 NOTE — DISCHARGE INSTRUCTIONS
Use Afrin nasal spray as directed for nosebleeds that are not responsive to holding pressure with your fingers as directed  Follow-up with your primary care provider for further management  Return to the emergency room for new, worsening, or concerning symptoms

## 2019-09-10 NOTE — ED TRIAGE NOTES
Hamlet Terrell, an 53 y.o. male presents to the ED stating that he had one episode of blood oozing from his right nare earlier today.  He stated that it formed a clot and that he felt like it was impeding his ability to breathe so he dug in his nose with a q tip and it bled a little more.  Patient's home care nurse told him to get to the emergency room as fast as he could.  There is no bleeding at this time.  The patient put his finger in his nose to show me that it did bleed and still there was no bleeding noted at that time.  Patient is on Coumadin        Review of patient's allergies indicates:   Allergen Reactions    Acetaminophen      Itching    Oxycodone-acetaminophen      Other reaction(s): Itching    Ace inhibitors Other (See Comments)     cough     Chief Complaint   Patient presents with    Epistaxis     nosebleed few hours ago. bleeding controlled in route here. takes bloodthinners.     Past Medical History:   Diagnosis Date    Anticoagulant long-term use     Cardiomegaly     Chronic combined systolic and diastolic congestive heart failure     Coronary artery disease     Heart attack 2006    Hypertension     Hyperthyroidism, subclinical 1/2/2013    MI (myocardial infarction) 9/22/2013    MI in 2009      Paroxysmal atrial fibrillation     PE (pulmonary embolism) 1/1/2013    IN 2010     S/P ablation of atrial flutter 2008    Stroke 2009    no residual weaknesses

## 2019-09-11 ENCOUNTER — ANTI-COAG VISIT (OUTPATIENT)
Dept: CARDIOLOGY | Facility: CLINIC | Age: 54
End: 2019-09-11

## 2019-09-11 DIAGNOSIS — Z79.01 CHRONIC ANTICOAGULATION: ICD-10-CM

## 2019-09-11 DIAGNOSIS — I48.20 ATRIAL FIBRILLATION, CHRONIC: ICD-10-CM

## 2019-09-11 LAB — INR PPP: 2.9

## 2019-09-11 NOTE — PROGRESS NOTES
Pt should have a 4mg tablet, taking 8mg daily. Please verify dose with patient. Check on nosebleed and if any missed doses this week.

## 2019-09-11 NOTE — PROGRESS NOTES
INR lab draw was missed 8/29, today 9/11 Libia with [facility] called in a verbal result dated today as: INR -2.9 / PT -34.9, hard copy to be faxed, Libia reports the Patient was in ER 9/09 due to a nose bleed, INR in ER on 9/09 was 2.4, verified coumadin: 12mg daily, no other changes reported

## 2019-09-16 LAB
ACID FAST MOD KINY STN SPEC: NORMAL
MYCOBACTERIUM SPEC QL CULT: NORMAL

## 2019-10-07 PROBLEM — G89.11 ACUTE PAIN OF RIGHT SHOULDER DUE TO TRAUMA: Status: RESOLVED | Noted: 2019-07-03 | Resolved: 2019-10-07

## 2019-10-07 PROBLEM — M25.511 ACUTE PAIN OF RIGHT SHOULDER DUE TO TRAUMA: Status: RESOLVED | Noted: 2019-07-03 | Resolved: 2019-10-07

## 2019-10-23 ENCOUNTER — HOSPITAL ENCOUNTER (OUTPATIENT)
Facility: HOSPITAL | Age: 54
Discharge: HOME | DRG: 291 | End: 2019-10-26
Attending: EMERGENCY MEDICINE | Admitting: INTERNAL MEDICINE
Payer: MEDICAID

## 2019-10-23 DIAGNOSIS — I10 ESSENTIAL HYPERTENSION: Chronic | ICD-10-CM

## 2019-10-23 DIAGNOSIS — J96.01 ACUTE HYPOXEMIC RESPIRATORY FAILURE: ICD-10-CM

## 2019-10-23 DIAGNOSIS — R79.1 ELEVATED INR: ICD-10-CM

## 2019-10-23 DIAGNOSIS — Z86.73 HISTORY OF CVA (CEREBROVASCULAR ACCIDENT): ICD-10-CM

## 2019-10-23 DIAGNOSIS — R55 SYNCOPE: ICD-10-CM

## 2019-10-23 DIAGNOSIS — R40.20 LOC (LOSS OF CONSCIOUSNESS): ICD-10-CM

## 2019-10-23 DIAGNOSIS — I50.43 ACUTE ON CHRONIC COMBINED SYSTOLIC AND DIASTOLIC HEART FAILURE: ICD-10-CM

## 2019-10-23 DIAGNOSIS — Z91.148 NON COMPLIANCE W MEDICATION REGIMEN: Chronic | ICD-10-CM

## 2019-10-23 DIAGNOSIS — I48.20 ATRIAL FIBRILLATION, CHRONIC: ICD-10-CM

## 2019-10-23 DIAGNOSIS — R06.02 SHORTNESS OF BREATH: ICD-10-CM

## 2019-10-23 DIAGNOSIS — Z79.01 CHRONIC ANTICOAGULATION: ICD-10-CM

## 2019-10-23 DIAGNOSIS — N18.30 STAGE 3 CHRONIC KIDNEY DISEASE: ICD-10-CM

## 2019-10-23 DIAGNOSIS — I50.9 ACUTE ON CHRONIC CONGESTIVE HEART FAILURE, UNSPECIFIED HEART FAILURE TYPE: Primary | ICD-10-CM

## 2019-10-23 PROBLEM — D50.9 MICROCYTIC ANEMIA: Status: ACTIVE | Noted: 2019-10-23

## 2019-10-23 LAB
ABO + RH BLD: NORMAL
ALBUMIN SERPL BCP-MCNC: 3.7 G/DL (ref 3.5–5.2)
ALP SERPL-CCNC: 214 U/L (ref 55–135)
ALT SERPL W/O P-5'-P-CCNC: 10 U/L (ref 10–44)
ANION GAP SERPL CALC-SCNC: 13 MMOL/L (ref 8–16)
AST SERPL-CCNC: 28 U/L (ref 10–40)
BACTERIA #/AREA URNS AUTO: ABNORMAL /HPF
BASOPHILS # BLD AUTO: 0.04 K/UL (ref 0–0.2)
BASOPHILS NFR BLD: 0.8 % (ref 0–1.9)
BILIRUB SERPL-MCNC: 1.2 MG/DL (ref 0.1–1)
BILIRUB UR QL STRIP: NEGATIVE
BLD GP AB SCN CELLS X3 SERPL QL: NORMAL
BNP SERPL-MCNC: 243 PG/ML (ref 0–99)
BUN SERPL-MCNC: 22 MG/DL (ref 6–20)
CALCIUM SERPL-MCNC: 9.4 MG/DL (ref 8.7–10.5)
CHLORIDE SERPL-SCNC: 101 MMOL/L (ref 95–110)
CLARITY UR REFRACT.AUTO: CLEAR
CO2 SERPL-SCNC: 23 MMOL/L (ref 23–29)
COLOR UR AUTO: YELLOW
CREAT SERPL-MCNC: 1.6 MG/DL (ref 0.5–1.4)
DIFFERENTIAL METHOD: ABNORMAL
EOSINOPHIL # BLD AUTO: 0.3 K/UL (ref 0–0.5)
EOSINOPHIL NFR BLD: 5 % (ref 0–8)
ERYTHROCYTE [DISTWIDTH] IN BLOOD BY AUTOMATED COUNT: 19 % (ref 11.5–14.5)
EST. GFR  (AFRICAN AMERICAN): 56.4 ML/MIN/1.73 M^2
EST. GFR  (NON AFRICAN AMERICAN): 48.8 ML/MIN/1.73 M^2
FERRITIN SERPL-MCNC: 41 NG/ML (ref 20–300)
FOLATE SERPL-MCNC: 13.2 NG/ML (ref 4–24)
GLUCOSE SERPL-MCNC: 80 MG/DL (ref 70–110)
GLUCOSE UR QL STRIP: NEGATIVE
HCT VFR BLD AUTO: 23.4 % (ref 40–54)
HGB BLD-MCNC: 6.8 G/DL (ref 14–18)
HGB UR QL STRIP: NEGATIVE
HYALINE CASTS UR QL AUTO: 12 /LPF
IMM GRANULOCYTES # BLD AUTO: 0.01 K/UL (ref 0–0.04)
IMM GRANULOCYTES NFR BLD AUTO: 0.2 % (ref 0–0.5)
INR PPP: 4.9 (ref 0.8–1.2)
IRON SERPL-MCNC: 25 UG/DL (ref 45–160)
KETONES UR QL STRIP: NEGATIVE
LEUKOCYTE ESTERASE UR QL STRIP: NEGATIVE
LYMPHOCYTES # BLD AUTO: 1 K/UL (ref 1–4.8)
LYMPHOCYTES NFR BLD: 18.7 % (ref 18–48)
MAGNESIUM SERPL-MCNC: 1.9 MG/DL (ref 1.6–2.6)
MCH RBC QN AUTO: 21.8 PG (ref 27–31)
MCHC RBC AUTO-ENTMCNC: 29.1 G/DL (ref 32–36)
MCV RBC AUTO: 75 FL (ref 82–98)
MICROSCOPIC COMMENT: ABNORMAL
MONOCYTES # BLD AUTO: 1.2 K/UL (ref 0.3–1)
MONOCYTES NFR BLD: 23.7 % (ref 4–15)
NEUTROPHILS # BLD AUTO: 2.7 K/UL (ref 1.8–7.7)
NEUTROPHILS NFR BLD: 51.6 % (ref 38–73)
NITRITE UR QL STRIP: NEGATIVE
NRBC BLD-RTO: 0 /100 WBC
PH UR STRIP: 5 [PH] (ref 5–8)
PLATELET # BLD AUTO: 347 K/UL (ref 150–350)
PMV BLD AUTO: 9.8 FL (ref 9.2–12.9)
POTASSIUM SERPL-SCNC: 3.8 MMOL/L (ref 3.5–5.1)
PROT SERPL-MCNC: 8.7 G/DL (ref 6–8.4)
PROT UR QL STRIP: ABNORMAL
PROTHROMBIN TIME: 48.6 SEC (ref 9–12.5)
RBC # BLD AUTO: 3.12 M/UL (ref 4.6–6.2)
RBC #/AREA URNS AUTO: 2 /HPF (ref 0–4)
RETICS/RBC NFR AUTO: 1.1 % (ref 0.4–2)
SATURATED IRON: 5 % (ref 20–50)
SODIUM SERPL-SCNC: 137 MMOL/L (ref 136–145)
SP GR UR STRIP: 1.01 (ref 1–1.03)
TOTAL IRON BINDING CAPACITY: 539 UG/DL (ref 250–450)
TRANSFERRIN SERPL-MCNC: 364 MG/DL (ref 200–375)
TROPONIN I SERPL DL<=0.01 NG/ML-MCNC: 0.06 NG/ML (ref 0–0.03)
TROPONIN I SERPL DL<=0.01 NG/ML-MCNC: 0.07 NG/ML (ref 0–0.03)
URN SPEC COLLECT METH UR: ABNORMAL
VIT B12 SERPL-MCNC: 926 PG/ML (ref 210–950)
WBC # BLD AUTO: 5.2 K/UL (ref 3.9–12.7)
WBC #/AREA URNS AUTO: 2 /HPF (ref 0–5)

## 2019-10-23 PROCEDURE — 63600175 PHARM REV CODE 636 W HCPCS: Performed by: EMERGENCY MEDICINE

## 2019-10-23 PROCEDURE — 99285 PR EMERGENCY DEPT VISIT,LEVEL V: ICD-10-PCS | Mod: ,,, | Performed by: EMERGENCY MEDICINE

## 2019-10-23 PROCEDURE — G0378 HOSPITAL OBSERVATION PER HR: HCPCS

## 2019-10-23 PROCEDURE — 82746 ASSAY OF FOLIC ACID SERUM: CPT

## 2019-10-23 PROCEDURE — 85025 COMPLETE CBC W/AUTO DIFF WBC: CPT

## 2019-10-23 PROCEDURE — 82607 VITAMIN B-12: CPT

## 2019-10-23 PROCEDURE — 93010 EKG 12-LEAD: ICD-10-PCS | Mod: ,,, | Performed by: INTERNAL MEDICINE

## 2019-10-23 PROCEDURE — 86901 BLOOD TYPING SEROLOGIC RH(D): CPT

## 2019-10-23 PROCEDURE — 99285 EMERGENCY DEPT VISIT HI MDM: CPT | Mod: ,,, | Performed by: EMERGENCY MEDICINE

## 2019-10-23 PROCEDURE — 36415 COLL VENOUS BLD VENIPUNCTURE: CPT

## 2019-10-23 PROCEDURE — 85610 PROTHROMBIN TIME: CPT

## 2019-10-23 PROCEDURE — 81001 URINALYSIS AUTO W/SCOPE: CPT

## 2019-10-23 PROCEDURE — 83540 ASSAY OF IRON: CPT

## 2019-10-23 PROCEDURE — 99285 EMERGENCY DEPT VISIT HI MDM: CPT | Mod: 25

## 2019-10-23 PROCEDURE — 93010 ELECTROCARDIOGRAM REPORT: CPT | Mod: ,,, | Performed by: INTERNAL MEDICINE

## 2019-10-23 PROCEDURE — 11000001 HC ACUTE MED/SURG PRIVATE ROOM

## 2019-10-23 PROCEDURE — 99222 1ST HOSP IP/OBS MODERATE 55: CPT | Mod: ,,, | Performed by: INTERNAL MEDICINE

## 2019-10-23 PROCEDURE — 83880 ASSAY OF NATRIURETIC PEPTIDE: CPT

## 2019-10-23 PROCEDURE — 63600175 PHARM REV CODE 636 W HCPCS: Performed by: INTERNAL MEDICINE

## 2019-10-23 PROCEDURE — 80053 COMPREHEN METABOLIC PANEL: CPT

## 2019-10-23 PROCEDURE — 85045 AUTOMATED RETICULOCYTE COUNT: CPT

## 2019-10-23 PROCEDURE — 84484 ASSAY OF TROPONIN QUANT: CPT

## 2019-10-23 PROCEDURE — 84484 ASSAY OF TROPONIN QUANT: CPT | Mod: 91

## 2019-10-23 PROCEDURE — 99222 PR INITIAL HOSPITAL CARE,LEVL II: ICD-10-PCS | Mod: ,,, | Performed by: INTERNAL MEDICINE

## 2019-10-23 PROCEDURE — 25000003 PHARM REV CODE 250: Performed by: INTERNAL MEDICINE

## 2019-10-23 PROCEDURE — 82728 ASSAY OF FERRITIN: CPT

## 2019-10-23 PROCEDURE — 93005 ELECTROCARDIOGRAM TRACING: CPT

## 2019-10-23 PROCEDURE — 83735 ASSAY OF MAGNESIUM: CPT

## 2019-10-23 RX ORDER — FUROSEMIDE 10 MG/ML
80 INJECTION INTRAMUSCULAR; INTRAVENOUS
Status: COMPLETED | OUTPATIENT
Start: 2019-10-23 | End: 2019-10-23

## 2019-10-23 RX ORDER — MIRTAZAPINE 7.5 MG/1
7.5 TABLET, FILM COATED ORAL NIGHTLY
Status: DISCONTINUED | OUTPATIENT
Start: 2019-10-23 | End: 2019-10-26 | Stop reason: HOSPADM

## 2019-10-23 RX ORDER — ISOSORBIDE DINITRATE AND HYDRALAZINE HYDROCHLORIDE 37.5; 2 MG/1; MG/1
1 TABLET ORAL 3 TIMES DAILY
Status: DISCONTINUED | OUTPATIENT
Start: 2019-10-23 | End: 2019-10-26 | Stop reason: HOSPADM

## 2019-10-23 RX ORDER — METOPROLOL TARTRATE 25 MG/1
25 TABLET, FILM COATED ORAL 2 TIMES DAILY
Status: DISCONTINUED | OUTPATIENT
Start: 2019-10-23 | End: 2019-10-26 | Stop reason: HOSPADM

## 2019-10-23 RX ORDER — FUROSEMIDE 10 MG/ML
80 INJECTION INTRAMUSCULAR; INTRAVENOUS 2 TIMES DAILY
Status: DISCONTINUED | OUTPATIENT
Start: 2019-10-23 | End: 2019-10-25

## 2019-10-23 RX ORDER — FERROUS SULFATE 325(65) MG
325 TABLET, DELAYED RELEASE (ENTERIC COATED) ORAL DAILY
Status: DISCONTINUED | OUTPATIENT
Start: 2019-10-24 | End: 2019-10-26 | Stop reason: HOSPADM

## 2019-10-23 RX ORDER — GABAPENTIN 250 MG/5ML
100 SOLUTION ORAL EVERY 12 HOURS
Status: DISCONTINUED | OUTPATIENT
Start: 2019-10-23 | End: 2019-10-26 | Stop reason: HOSPADM

## 2019-10-23 RX ORDER — SODIUM CHLORIDE 0.9 % (FLUSH) 0.9 %
10 SYRINGE (ML) INJECTION
Status: DISCONTINUED | OUTPATIENT
Start: 2019-10-23 | End: 2019-10-26 | Stop reason: HOSPADM

## 2019-10-23 RX ORDER — PANTOPRAZOLE SODIUM 40 MG/1
40 TABLET, DELAYED RELEASE ORAL DAILY
Status: DISCONTINUED | OUTPATIENT
Start: 2019-10-24 | End: 2019-10-26 | Stop reason: HOSPADM

## 2019-10-23 RX ADMIN — FUROSEMIDE 80 MG: 10 INJECTION, SOLUTION INTRAMUSCULAR; INTRAVENOUS at 03:10

## 2019-10-23 RX ADMIN — HYDRALAZINE HYDROCHLORIDE AND ISOSORBIDE DINITRATE 1 TABLET: 37.5; 2 TABLET, FILM COATED ORAL at 09:10

## 2019-10-23 RX ADMIN — METOPROLOL TARTRATE 25 MG: 25 TABLET, FILM COATED ORAL at 09:10

## 2019-10-23 RX ADMIN — GABAPENTIN 100 MG: 250 SOLUTION ORAL at 09:10

## 2019-10-23 RX ADMIN — MIRTAZAPINE 7.5 MG: 7.5 TABLET ORAL at 09:10

## 2019-10-23 RX ADMIN — FUROSEMIDE 80 MG: 10 INJECTION, SOLUTION INTRAMUSCULAR; INTRAVENOUS at 06:10

## 2019-10-23 NOTE — SUBJECTIVE & OBJECTIVE
Past Medical History:   Diagnosis Date    Anticoagulant long-term use     Cardiomegaly     Chronic combined systolic and diastolic congestive heart failure     Coronary artery disease     Heart attack 2006    Hypertension     Hyperthyroidism, subclinical 1/2/2013    MI (myocardial infarction) 9/22/2013    MI in 2009      Paroxysmal atrial fibrillation     PE (pulmonary embolism) 1/1/2013    IN 2010     S/P ablation of atrial flutter 2008    Stroke 2009    no residual weaknesses       Past Surgical History:   Procedure Laterality Date    RADIOFREQUENCY ABLATION  01/08/2008    for atrial flutter       Review of patient's allergies indicates:   Allergen Reactions    Acetaminophen      Itching    Oxycodone-acetaminophen      Other reaction(s): Itching    Ace inhibitors Other (See Comments)     cough       No current facility-administered medications on file prior to encounter.      Current Outpatient Medications on File Prior to Encounter   Medication Sig    albuterol (PROVENTIL/VENTOLIN HFA) 90 mcg/actuation inhaler Inhale 1-2 puffs into the lungs every 6 (six) hours as needed for Wheezing. Rescue    aspirin (ECOTRIN) 81 MG EC tablet Take 81 mg by mouth once daily.    calcium carbonate (TUMS) 200 mg calcium (500 mg) chewable tablet Take 1 tablet (500 mg total) by mouth 3 (three) times daily as needed.    ferrous sulfate 325 (65 FE) MG EC tablet Take 1 tablet (325 mg total) by mouth once daily.    furosemide (LASIX) 80 MG tablet Take 1 tablet (80 mg total) by mouth 2 (two) times daily.    gabapentin (NEURONTIN) 250 mg/5 mL (5 mL) solution Take 2 mLs (100 mg total) by mouth 2 (two) times daily.    isosorbide-hydrALAZINE 20-37.5 mg (BIDIL) 20-37.5 mg Tab Take 1 tablet by mouth 2 (two) times daily.    methyl salicylate-menthol 15-10% 15-10 % Crea Apply topically 3 (three) times daily.    metoprolol tartrate (LOPRESSOR) 25 MG tablet Take 1 tablet (25 mg total) by mouth every 6 (six) hours.     nitroGLYCERIN (NITROSTAT) 0.4 MG SL tablet Place 1 tablet (0.4 mg total) under the tongue every 5 (five) minutes as needed for Chest pain. Tablet, Sublingual Sublingual    pantoprazole (PROTONIX) 40 MG tablet Take 1 tablet (40 mg total) by mouth once daily.    polyethylene glycol (GLYCOLAX) 17 gram PwPk Take 17 g by mouth 2 (two) times daily.    ramelteon (ROZEREM) 8 mg tablet Take 1 tablet (8 mg total) by mouth nightly as needed for Insomnia.    senna-docusate 8.6-50 mg (PERICOLACE) 8.6-50 mg per tablet Take 1 tablet by mouth 2 (two) times daily.    warfarin (COUMADIN) 4 MG tablet Take 2 tablets (8 mg total) by mouth Daily. As directed by coumadin clinic     Family History     Problem Relation (Age of Onset)    Alcohol abuse Father    Hypertension Mother, Father, Sister, Brother    Stroke Mother        Tobacco Use    Smoking status: Never Smoker    Smokeless tobacco: Never Used   Substance and Sexual Activity    Alcohol use: No    Drug use: No    Sexual activity: Never     Review of Systems   Constitutional: Positive for fatigue. Negative for chills, diaphoresis and fever.   HENT: Negative for congestion, rhinorrhea and sinus pain.    Eyes: Negative for visual disturbance.   Respiratory: Positive for shortness of breath. Negative for cough, chest tightness and wheezing.    Cardiovascular: Positive for leg swelling. Negative for chest pain and palpitations.   Gastrointestinal: Negative for abdominal pain, blood in stool, constipation, diarrhea, nausea and vomiting.   Genitourinary: Negative for difficulty urinating, dysuria and hematuria.   Musculoskeletal: Negative for arthralgias, back pain and myalgias.   Skin: Negative for color change and pallor.   Neurological: Positive for syncope, weakness and light-headedness. Negative for facial asymmetry and headaches.   Psychiatric/Behavioral: Negative for behavioral problems and confusion.     Objective:     Vital Signs (Most Recent):  Temp: 97.9 °F (36.6 °C)  (10/23/19 1129)  Pulse: 86 (10/23/19 1523)  Resp: 18 (10/23/19 1413)  BP: (!) 180/75 (10/23/19 1413)  SpO2: 98 % (10/23/19 1413) Vital Signs (24h Range):  Temp:  [97.9 °F (36.6 °C)] 97.9 °F (36.6 °C)  Pulse:  [80-86] 86  Resp:  [17-18] 18  SpO2:  [97 %-98 %] 98 %  BP: (126-180)/(75-76) 180/75     Weight: 108.9 kg (240 lb)  Body mass index is 35.44 kg/m².    Physical Exam   Constitutional: He is oriented to person, place, and time. He appears well-developed. No distress.   obese   HENT:   Head: Normocephalic and atraumatic.   Eyes: Pupils are equal, round, and reactive to light. Conjunctivae and EOM are normal.   Neck: Normal range of motion. Neck supple.   Cardiovascular: Normal rate.   No murmur heard.  Irregularly irregular rhythm   Pulmonary/Chest: Effort normal and breath sounds normal. No respiratory distress. He has no wheezes.   Abdominal: Soft. Bowel sounds are normal. He exhibits no distension. There is no tenderness.   Musculoskeletal: Normal range of motion. He exhibits edema. He exhibits no tenderness.   Neurological: He is alert and oriented to person, place, and time. No cranial nerve deficit.   Skin: Skin is warm and dry. He is not diaphoretic.   Psychiatric: He has a normal mood and affect. His behavior is normal.         CRANIAL NERVES     CN III, IV, VI   Pupils are equal, round, and reactive to light.  Extraocular motions are normal.        Significant Labs: All pertinent labs within the past 24 hours have been reviewed.    Significant Imaging: I have reviewed and interpreted all pertinent imaging results/findings within the past 24 hours.

## 2019-10-23 NOTE — ED TRIAGE NOTES
Hamlet Terrell, a 53 y.o. male presents to the ED w/ complaint of shortness of breath with minimal exertion denies swelling at this time. Reports near syncope    Triage note:  Chief Complaint   Patient presents with    Near syncope     arrived via NO EMS from home with c/o near syncope, denies fall or injury, c/o chronic SOB     Review of patient's allergies indicates:   Allergen Reactions    Acetaminophen      Itching    Oxycodone-acetaminophen      Other reaction(s): Itching    Ace inhibitors Other (See Comments)     cough     Past Medical History:   Diagnosis Date    Anticoagulant long-term use     Cardiomegaly     Chronic combined systolic and diastolic congestive heart failure     Coronary artery disease     Heart attack 2006    Hypertension     Hyperthyroidism, subclinical 1/2/2013    MI (myocardial infarction) 9/22/2013    MI in 2009      Paroxysmal atrial fibrillation     PE (pulmonary embolism) 1/1/2013    IN 2010     S/P ablation of atrial flutter 2008    Stroke 2009    no residual weaknesses     LOC: Patient name and date of birth verified. The patient is awake, alert and aware of environment with an appropriate affect, the patient is oriented x 3 and speaking appropriately.   APPEARANCE: Patient resting comfortably, patient is clean and well groomed, patient's clothing is properly fastened.  SKIN: The skin is warm and dry, color consistent with ethnicity, patient has normal skin turgor and moist mucus membranes, skin intact, no breakdown or bruising noted.  MUSCULOSKELETAL: Patient moving all extremities well, no obvious swelling or deformities noted.   RESPIRATORY: Respirations are spontaneous, patient has a normal effort and rate, no accessory muscle use noted. Shortness of breath reported by patient with minimal exertion  CARDIAC: Patient has a normal rate and rhythm, no periphreal edema noted, capillary refill < 3 seconds. Denies chest pain at this time.   ABDOMEN: Soft and non  tender to palpation, no distention noted. Bowel sounds present in all four quadrants.  NEUROLOGIC: Eyes open spontaneously, behavior appropriate to situation, follows commands, facial expression symmetrical, bilateral hand grasp equal and even, purposeful motor response noted, normal sensation in all extremities when touched with a finger.

## 2019-10-23 NOTE — ASSESSMENT & PLAN NOTE
--possibly due to orthostatic hypotension in the setting of severe anemia (hgb 6.8) and BB use vs hypoxemia vs cardiogenic (arrhythmia, valvular disease or ACS) vs TIA  --currently AAOx4 in ED with stable vital signs  --refusing blood transfusion, so will monitor for now  --trending trop until flat (initial trop 0.057); f/u repeat  --TTE pending  --continue telemetry  --f/u orthostatics  --on 3L NC; will need 6 min walk test prior to DC home  --PT/OT consulted

## 2019-10-23 NOTE — ED NOTES
Patient refusing any care at this time.  Patient not being very friendly to nursing staff.  Dr. Buckley made aware.

## 2019-10-23 NOTE — ASSESSMENT & PLAN NOTE
"--unclear etiology, but has been chronically anemic   --hgb 6.8 down from 7.3 on 8/4/19  --no site of active bleeding identified (no epistaxis, having light brown BMs, no hematuria)  --INR 4.9, down from 5.3 earlier today (per pt) without signs of active bleeding, so will monitor for now with daily INR and continue to hold warfarin. Will plan to reverse with Vit K in the setting of active bleeding.   --type and screen and repeat cbc ordered  --REFUSING blood transfusions for fear of nigel "AIDS". Tried to explain that the risk of dying from a brisk bleed in the setting of INR 4.9 is much higher than that of nigel HIV from a transfusion and even higher than that of actually dying from HIV. Still refused.  --anemia workup ordered    "

## 2019-10-23 NOTE — ASSESSMENT & PLAN NOTE
--EF 23% per most recent TTE 4/2019  --BNP only mildly elevated at 243, but may be low in obese pts  --CXR and lower extremities with edema  --s/p lasix 80mg IV in ED, will continue with BID dosing for now; uptitrate as needed for goal output 1-2L /day  --f/u repeat TTE  --strict I/O, daily weights  --fluid restrict 1.5L /day

## 2019-10-23 NOTE — ASSESSMENT & PLAN NOTE
--unclear what sats are on RA, nut currenlty at goal on 3L NC  --says he is supposed to be on home oxygen, but his home concentrator does not work?  --no hx of COPD or asthma, says O2 is for his hx of CHF  --will plan on 6MWT  --wean off as tolerated

## 2019-10-23 NOTE — ED PROVIDER NOTES
"Encounter Date: 10/23/2019       History     Chief Complaint   Patient presents with    Near syncope     arrived via NO EMS from home with c/o near syncope, denies fall or injury, c/o chronic SOB     53M combined CHF (last Echo 4/2019 with EF 23%, diastolic dysfuction, moderated MR and TR), HTN, chronic A. fib (on coumadin), A. flutter s/p ablation, CVA (in 2009), CAD, ?PE (patient reported), non-compliance, presenting with syncope. Patient reports his legs started to swell up last night, reports his chronic shortness of breath is unchanged, however today while he was standing "at the gate" he suddenly felt lightheaded and had a near syncopal event, with vision briefly blacking out requiring him to sit down.  Denies full syncope or trauma. Denies chest pain, cough, fever, nausea, vomiting, diarrhea.        Review of patient's allergies indicates:   Allergen Reactions    Acetaminophen      Itching    Oxycodone-acetaminophen      Other reaction(s): Itching    Ace inhibitors Other (See Comments)     cough     Past Medical History:   Diagnosis Date    Anticoagulant long-term use     Cardiomegaly     Chronic combined systolic and diastolic congestive heart failure     Coronary artery disease     Heart attack 2006    Hypertension     Hyperthyroidism, subclinical 1/2/2013    MI (myocardial infarction) 9/22/2013    MI in 2009      Paroxysmal atrial fibrillation     PE (pulmonary embolism) 1/1/2013    IN 2010     S/P ablation of atrial flutter 2008    Stroke 2009    no residual weaknesses     Past Surgical History:   Procedure Laterality Date    RADIOFREQUENCY ABLATION  01/08/2008    for atrial flutter     Family History   Problem Relation Age of Onset    Hypertension Mother     Stroke Mother     Hypertension Father     Alcohol abuse Father     Hypertension Sister     Hypertension Brother      Social History     Tobacco Use    Smoking status: Never Smoker    Smokeless tobacco: Never Used   Substance " Use Topics    Alcohol use: No    Drug use: No     Review of Systems   Constitutional: Positive for fatigue. Negative for chills, diaphoresis and fever.   Eyes: Negative for visual disturbance.        Neg vision changes   Respiratory: Negative for cough and shortness of breath.    Cardiovascular: Positive for leg swelling. Negative for chest pain and palpitations.   Gastrointestinal: Negative for abdominal pain, diarrhea, nausea and vomiting.        Neg changes in stool   Genitourinary: Negative for flank pain and hematuria.        Neg changes in urination   Musculoskeletal: Negative for arthralgias, back pain, joint swelling and neck pain.   Skin: Negative for pallor and rash.   Allergic/Immunologic: Negative for immunocompromised state.   Neurological: Positive for syncope and light-headedness. Negative for headaches.   Hematological: Does not bruise/bleed easily.   Psychiatric/Behavioral: Negative for confusion.       Physical Exam     Initial Vitals [10/23/19 1129]   BP Pulse Resp Temp SpO2   126/76 80 17 97.9 °F (36.6 °C) 97 %      MAP       --         Physical Exam    Nursing note and vitals reviewed.  Constitutional: He appears well-developed and well-nourished. He is not diaphoretic. No distress.   HENT:   Head: Normocephalic and atraumatic.   Nose: Nose normal.   Eyes: Conjunctivae and EOM are normal. Pupils are equal, round, and reactive to light. No scleral icterus.   Neck: Normal range of motion. Neck supple. JVD present.   Cardiovascular: Normal rate and regular rhythm.   No murmur heard.  Pulmonary/Chest: No stridor. He exhibits no tenderness.   Decreased breath sounds bilaterally   Abdominal: Soft. Bowel sounds are normal. He exhibits no distension. There is tenderness. There is no rebound.   Musculoskeletal: Normal range of motion. He exhibits edema.   Lymphadenopathy:     He has no cervical adenopathy.   Neurological: He is alert and oriented to person, place, and time. He has normal strength. No  cranial nerve deficit or sensory deficit.   Skin: Skin is warm and dry. Capillary refill takes less than 2 seconds. No rash noted. No pallor.   Psychiatric: He has a normal mood and affect. His behavior is normal. Thought content normal.         ED Course   Procedures  Labs Reviewed   CBC W/ AUTO DIFFERENTIAL - Abnormal; Notable for the following components:       Result Value    RBC 3.12 (*)     Hemoglobin 6.8 (*)     Hematocrit 23.4 (*)     Mean Corpuscular Volume 75 (*)     Mean Corpuscular Hemoglobin 21.8 (*)     Mean Corpuscular Hemoglobin Conc 29.1 (*)     RDW 19.0 (*)     Mono # 1.2 (*)     Mono% 23.7 (*)     All other components within normal limits   COMPREHENSIVE METABOLIC PANEL - Abnormal; Notable for the following components:    BUN, Bld 22 (*)     Creatinine 1.6 (*)     Total Protein 8.7 (*)     Total Bilirubin 1.2 (*)     Alkaline Phosphatase 214 (*)     eGFR if  56.4 (*)     eGFR if non  48.8 (*)     All other components within normal limits   TROPONIN I - Abnormal; Notable for the following components:    Troponin I 0.057 (*)     All other components within normal limits   B-TYPE NATRIURETIC PEPTIDE - Abnormal; Notable for the following components:     (*)     All other components within normal limits   PROTIME-INR - Abnormal; Notable for the following components:    Prothrombin Time 48.6 (*)     INR 4.9 (*)     All other components within normal limits   MAGNESIUM   MAGNESIUM    Narrative:     ADD-ON MG #[id number] PER ALEC MAYORGA MD 13:32  10/23/2019    CBC W/ AUTO DIFFERENTIAL     EKG Readings: (Independently Interpreted)   Initial Reading: No STEMI.   Atrial fibrillation with PVCs, rate 76, ,  with intraventricular block, similar to prior     ECG Results          EKG 12-lead (Final result)  Result time 10/23/19 14:37:03    Final result by Interface, Lab In [facility] (10/23/19 14:37:03)                 Narrative:    Test Reason :  R40.20,    Vent. Rate : 076 BPM     Atrial Rate : 042 BPM     P-R Int : 000 ms          QRS Dur : 142 ms      QT Int : 452 ms       P-R-T Axes : 000 -47 150 degrees     QTc Int : 508 ms    Atrial fibrillation with premature ventricular or aberrantly conducted  complexes  Left axis deviation  Nonspecific intraventricular block  T wave abnormality, consider anterolateral ischemia  Abnormal ECG  When compared with ECG of 04-AUG-2019 21:05,  QRS duration has decreased  Confirmed by RICK GUZMAN MD ([id number]) on 10/23/2019 2:36:56 PM    Referred By: JANES   SELF           Confirmed By:RICK GUZMAN MD                            Imaging Results          X-Ray Chest AP Portable (Final result)  Result time 10/23/19 13:06:44    Final result by Sebastian Mccarthy III, MD (10/23/19 13:06:44)                 Impression:      Moderate-severe CHF improved from the prior study.      Electronically signed by: Sebastian Mccarthy MD  Date:    10/23/2019  Time:    13:06             Narrative:    EXAMINATION:  XR CHEST AP PORTABLE    CLINICAL HISTORY:  CHF;    FINDINGS:  One view: There is cardiomegaly bilateral edema pleural fluid.                                 Medical Decision Making:   History:   Old Medical Records: I decided to obtain old medical records.  Old Records Summarized: records from clinic visits and records from previous admission(s).       <> Summary of Records: Pt admitted recently for CHF exacerbation, however left AMA prior to resolution  Initial Assessment:   Patient is alert and oriented x3, irritable mood but cooperative, no respiratory distress, CTAB, irregularly irregular rhythm consistent with AFib, abdomen soft and mildly diffusely tender, bilateral lower extremity edema 3+ pitting to knees with chronic stasis changes.  Differential Diagnosis:   Arrhythmia, orthostatic hypotension, ACS, electrolyte abnormality, metabolic abnormality. No focal neuro deficits concerning for CVA, however on differential given past  history of CVA.  Abdominal tenderness to palpation likely due to volume overload, however may obtain CT abdomen/pelvis if leukocytosis, intra-abdominal pathology less consistent given no report of nausea/vomiting or diarrhea.  Independently Interpreted Test(s):   I have ordered and independently interpreted X-rays - see summary below.       <> Summary of X-Ray Reading(s): Bilateral pulm edema  I have ordered and independently interpreted EKG Reading(s) - see prior notes  Clinical Tests:   Lab Tests: Ordered and Reviewed  Radiological Study: Ordered and Reviewed  Medical Tests: Ordered and Reviewed  ED Management:    Patient is anemic to 6.8/23.4, near baseline.  No leukocytosis, creatinine at baseline 1.6, alk-phos elevated to 214 at baseline  Coumadin supratherapeutic at 4.9.  BNP slightly elevated at 243 and troponin slightly elevated to 0.057, however they  are both decreased compared to prior 3 months ago.  Chest x-ray does show moderate to severe pulmonary edema, however this is noted to be improved compared to prior chest x-ray.  Planned admission for syncopal episode and diuresis.    Upon re-evaluation patient is nontender on exam, he reports his breathing has improved on oxygen, and he is amenable to admission for IV diuresis.  Will give 80 Lasix IV and admit for CHF exacerbation as well as syncope.      MDM Complexity Points:   Problem Points:  1.New problem, with additional ED work-up planned - Syncope  2.New problem, with additional ED work-up planned - CHF exacerbation    Data Points:  Review or order clinical lab tests, Review or order radiology test, Review or order medicine test (PFTs, EKG, cardiac echo or catheterization), Independent review of image, tracing, or specimen, Decision to obtain old records (in the EHR) and Review and summarization of old records    Risk:  High Risk                        Clinical Impression:       ICD-10-CM ICD-9-CM   1. Acute on chronic congestive heart failure,  unspecified heart failure type I50.9 428.0   2. LOC (loss of consciousness) R40.20 780.09   3. Shortness of breath R06.02 786.05   4. Syncope R55 780.2        Rosana Buckley MD  10/27/19 1930

## 2019-10-23 NOTE — HPI
53M combined CHF (last Echo 4/2019 with EF 23%, diastolic dysfuction, moderated MR and TR), HTN, chronic A. fib (on coumadin), A. flutter s/p ablation, CVA (in 2009), CAD, presented to ED for a syncopal episode earlier today.    Patient reports he got up to open a gate at his house when he suddenly lost consciousness for what he believes was 2-3 seconds. Denies head or body trauma. Did not experience lightheadedness, diaphoresis, chest pain, bowel/bladder incontinence or palpitations. Reports he has chronic dyspnea that limits his ability to ambulate, but states he is independent in his ADLs. He is not on home oxygen because his concentrator does not work. Reports medication compliance. Was taking warfarin 12mg po daily (last dose 7pm yesterday) and was told by his HH nurse that his INR was 5.3 today. Denies taking new meds including abx or supplements, no melenic or bloody stools (last BM was light brown last night), no epistaxis or hematuria. Notes spitting up a blood clot 2 days ago. Has also been taking lasix 80mg PO BID. Denies worsening lower ext edema (although he reported that to ED provider), but states he has gained approx 25lbs over the past mth. No reported fevers, chills, dysuria, abdominal pain, cough, malaise or diarrhea.     In the ED pt with stable vital signs. On 3L NC with sats 97%. Labs significant for Hgb 6.8, INR 5.9, , Trop 0.057. CXR with edema. Given lasix 80mg IV x 1.

## 2019-10-23 NOTE — H&P
[facility] Hospital Medicine  History & Physical    Patient Name: Hamlet Terrell  MRN: [id number]  Admission Date: 10/23/2019  Attending Physician: Lisa Rodriguez*   Primary Care Provider: Carlin Swift MD    [facility] Medicine Team: [facility] HOSP MED R Lisa Rodriguez MD     Patient information was obtained from patient, past medical records and ER records.     Subjective:     Principal Problem:Acute on chronic combined systolic and diastolic heart failure    Chief Complaint:   Chief Complaint   Patient presents with    Near syncope     arrived via NO EMS from home with c/o near syncope, denies fall or injury, c/o chronic SOB        HPI: 53M combined CHF (last Echo 4/2019 with EF 23%, diastolic dysfuction, moderated MR and TR), HTN, chronic A. fib (on coumadin), A. flutter s/p ablation, CVA (in 2009), CAD, presented to ED for a syncopal episode earlier today.    Patient reports he got up to open a gate at his house when he suddenly lost consciousness for what he believes was 2-3 seconds. Denies head or body trauma. Did not experience lightheadedness, diaphoresis, chest pain, bowel/bladder incontinence or palpitations. Reports he has chronic dyspnea that limits his ability to ambulate, but states he is independent in his ADLs. He is not on home oxygen because his concentrator does not work. Reports medication compliance. Was taking warfarin 12mg po daily (last dose 7pm yesterday) and was told by his  nurse that his INR was 5.3 today. Denies taking new meds including abx or supplements, no melenic or bloody stools (last BM was light brown last night), no epistaxis or hematuria. Notes spitting up a blood clot 2 days ago. Has also been taking lasix 80mg PO BID. Denies worsening lower ext edema (although he reported that to ED provider), but states he has gained approx 25lbs over the past mth. No reported fevers, chills, dysuria, abdominal pain, cough, malaise or diarrhea.     In the  ED pt with stable vital signs. On 3L NC with sats 97%. Labs significant for Hgb 6.8, INR 5.9, , Trop 0.057. CXR with edema. Given lasix 80mg IV x 1.         Past Medical History:   Diagnosis Date    Anticoagulant long-term use     Cardiomegaly     Chronic combined systolic and diastolic congestive heart failure     Coronary artery disease     Heart attack 2006    Hypertension     Hyperthyroidism, subclinical 1/2/2013    MI (myocardial infarction) 9/22/2013    MI in 2009      Paroxysmal atrial fibrillation     PE (pulmonary embolism) 1/1/2013    IN 2010     S/P ablation of atrial flutter 2008    Stroke 2009    no residual weaknesses       Past Surgical History:   Procedure Laterality Date    RADIOFREQUENCY ABLATION  01/08/2008    for atrial flutter       Review of patient's allergies indicates:   Allergen Reactions    Acetaminophen      Itching    Oxycodone-acetaminophen      Other reaction(s): Itching    Ace inhibitors Other (See Comments)     cough       No current facility-administered medications on file prior to encounter.      Current Outpatient Medications on File Prior to Encounter   Medication Sig    albuterol (PROVENTIL/VENTOLIN HFA) 90 mcg/actuation inhaler Inhale 1-2 puffs into the lungs every 6 (six) hours as needed for Wheezing. Rescue    aspirin (ECOTRIN) 81 MG EC tablet Take 81 mg by mouth once daily.    calcium carbonate (TUMS) 200 mg calcium (500 mg) chewable tablet Take 1 tablet (500 mg total) by mouth 3 (three) times daily as needed.    ferrous sulfate 325 (65 FE) MG EC tablet Take 1 tablet (325 mg total) by mouth once daily.    furosemide (LASIX) 80 MG tablet Take 1 tablet (80 mg total) by mouth 2 (two) times daily.    gabapentin (NEURONTIN) 250 mg/5 mL (5 mL) solution Take 2 mLs (100 mg total) by mouth 2 (two) times daily.    isosorbide-hydrALAZINE 20-37.5 mg (BIDIL) 20-37.5 mg Tab Take 1 tablet by mouth 2 (two) times daily.    methyl salicylate-menthol 15-10%  15-10 % Crea Apply topically 3 (three) times daily.    metoprolol tartrate (LOPRESSOR) 25 MG tablet Take 1 tablet (25 mg total) by mouth every 6 (six) hours.    nitroGLYCERIN (NITROSTAT) 0.4 MG SL tablet Place 1 tablet (0.4 mg total) under the tongue every 5 (five) minutes as needed for Chest pain. Tablet, Sublingual Sublingual    pantoprazole (PROTONIX) 40 MG tablet Take 1 tablet (40 mg total) by mouth once daily.    polyethylene glycol (GLYCOLAX) 17 gram PwPk Take 17 g by mouth 2 (two) times daily.    ramelteon (ROZEREM) 8 mg tablet Take 1 tablet (8 mg total) by mouth nightly as needed for Insomnia.    senna-docusate 8.6-50 mg (PERICOLACE) 8.6-50 mg per tablet Take 1 tablet by mouth 2 (two) times daily.    warfarin (COUMADIN) 4 MG tablet Take 2 tablets (8 mg total) by mouth Daily. As directed by coumadin clinic     Family History     Problem Relation (Age of Onset)    Alcohol abuse Father    Hypertension Mother, Father, Sister, Brother    Stroke Mother        Tobacco Use    Smoking status: Never Smoker    Smokeless tobacco: Never Used   Substance and Sexual Activity    Alcohol use: No    Drug use: No    Sexual activity: Never     Review of Systems   Constitutional: Positive for fatigue. Negative for chills, diaphoresis and fever.   HENT: Negative for congestion, rhinorrhea and sinus pain.    Eyes: Negative for visual disturbance.   Respiratory: Positive for shortness of breath. Negative for cough, chest tightness and wheezing.    Cardiovascular: Positive for leg swelling. Negative for chest pain and palpitations.   Gastrointestinal: Negative for abdominal pain, blood in stool, constipation, diarrhea, nausea and vomiting.   Genitourinary: Negative for difficulty urinating, dysuria and hematuria.   Musculoskeletal: Negative for arthralgias, back pain and myalgias.   Skin: Negative for color change and pallor.   Neurological: Positive for syncope, weakness and light-headedness. Negative for facial  asymmetry and headaches.   Psychiatric/Behavioral: Negative for behavioral problems and confusion.     Objective:     Vital Signs (Most Recent):  Temp: 97.9 °F (36.6 °C) (10/23/19 1129)  Pulse: 86 (10/23/19 1523)  Resp: 18 (10/23/19 1413)  BP: (!) 180/75 (10/23/19 1413)  SpO2: 98 % (10/23/19 1413) Vital Signs (24h Range):  Temp:  [97.9 °F (36.6 °C)] 97.9 °F (36.6 °C)  Pulse:  [80-86] 86  Resp:  [17-18] 18  SpO2:  [97 %-98 %] 98 %  BP: (126-180)/(75-76) 180/75     Weight: 108.9 kg (240 lb)  Body mass index is 35.44 kg/m².    Physical Exam   Constitutional: He is oriented to person, place, and time. He appears well-developed. No distress.   obese   HENT:   Head: Normocephalic and atraumatic.   Eyes: Pupils are equal, round, and reactive to light. Conjunctivae and EOM are normal.   Neck: Normal range of motion. Neck supple.   Cardiovascular: Normal rate.   No murmur heard.  Irregularly irregular rhythm   Pulmonary/Chest: Effort normal and breath sounds normal. No respiratory distress. He has no wheezes.   Abdominal: Soft. Bowel sounds are normal. He exhibits no distension. There is no tenderness.   Musculoskeletal: Normal range of motion. He exhibits edema. He exhibits no tenderness.   Neurological: He is alert and oriented to person, place, and time. No cranial nerve deficit.   Skin: Skin is warm and dry. He is not diaphoretic.   Psychiatric: He has a normal mood and affect. His behavior is normal.         CRANIAL NERVES     CN III, IV, VI   Pupils are equal, round, and reactive to light.  Extraocular motions are normal.        Significant Labs: All pertinent labs within the past 24 hours have been reviewed.    Significant Imaging: I have reviewed and interpreted all pertinent imaging results/findings within the past 24 hours.    Assessment/Plan:     * Acute on chronic combined systolic and diastolic heart failure  --EF 23% per most recent TTE 4/2019  --BNP only mildly elevated at 243, but may be low in obese  "pts  --CXR and lower extremities with edema  --s/p lasix 80mg IV in ED, will continue with BID dosing for now; uptitrate as needed for goal output 1-2L /day  --f/u repeat TTE  --strict I/O, daily weights  --fluid restrict 1.5L /day    Acute hypoxemic respiratory failure  --unclear what sats are on RA, nut currenlty at goal on 3L NC  --says he is supposed to be on home oxygen, but his home concentrator does not work?  --no hx of COPD or asthma, says O2 is for his hx of CHF  --will plan on 6MWT  --wean off as tolerated      Microcytic anemia  --unclear etiology, but has been chronically anemic   --hgb 6.8 down from 7.3 on 8/4/19  --no site of active bleeding identified (no epistaxis, having light brown BMs, no hematuria)  --INR 4.9, down from 5.3 earlier today (per pt) without signs of active bleeding, so will monitor for now with daily INR and continue to hold warfarin. Will plan to reverse with Vit K in the setting of active bleeding.   --type and screen and repeat cbc ordered  --REFUSING blood transfusions for fear of nigel "AIDS". Tried to explain that the risk of dying from a brisk bleed in the setting of INR 4.9 is much higher than that of nigel HIV from a transfusion and even higher than that of actually dying from HIV. Still refused.  --anemia workup ordered      Stage 3 chronic kidney disease  --Cr 1.6; stable  --monitor with daily bmp  --avoid nephrotoxic agents and renally dose meds      Subtherapeutic international normalized ratio (INR)  --unclear why INR elevated at 4.9 today  --reports med compliance; currently on warfarin 12mg po daily. says he's been on same dose since July.  --denies new meds, taking wrong dose or GI issues  --no signs of active bleeding, however. hgb 6.8, down from 7.3 on 8/4/19  --will monitor and f/u repeat cbc      Syncope  --possibly due to orthostatic hypotension in the setting of severe anemia (hgb 6.8) and BB use vs hypoxemia vs cardiogenic (arrhythmia, valvular " disease or ACS) vs TIA  --currently AAOx4 in ED with stable vital signs  --refusing blood transfusion, so will monitor for now  --trending trop until flat (initial trop 0.057); f/u repeat  --TTE pending  --continue telemetry  --f/u orthostatics  --on 3L NC; will need 6 min walk test prior to DC home  --PT/OT consulted      Essential hypertension  Hypertensive Urgency  --SBP> 180 in ED without signs of end organ damage  --resume home isosorbide-hydralazine   --monitor      Atrial fibrillation, chronic  --supratherapeutic INR 5.9, so hold warfarin for now  --resume BB; uptitrate as needed  --telemetry    Chronic anticoagulation  --on warfarin for afib  --hold warfarin for supratherapeutic INR      VTE Risk Mitigation (From admission, onward)         Ordered     Place sequential compression device  Until discontinued      10/23/19 1533     Reason for No Pharmacological VTE Prophylaxis  Once      10/23/19 1533     IP VTE HIGH RISK PATIENT  Once      10/23/19 1533     Place CAMI hose  Until discontinued      10/23/19 1439                   Lisa Rodriguez MD  Department of Hospital Medicine   [facility]

## 2019-10-23 NOTE — ASSESSMENT & PLAN NOTE
Hypertensive Urgency  --SBP> 180 in ED without signs of end organ damage  --resume home isosorbide-hydralazine   --monitor

## 2019-10-23 NOTE — ASSESSMENT & PLAN NOTE
--supratherapeutic INR 5.9, so hold warfarin for now  --resume BB; uptitrate as needed  --telemetry

## 2019-10-23 NOTE — ASSESSMENT & PLAN NOTE
--unclear why INR elevated at 4.9 today  --reports med compliance; currently on warfarin 12mg po daily. says he's been on same dose since July.  --denies new meds, taking wrong dose or GI issues  --no signs of active bleeding, however. hgb 6.8, down from 7.3 on 8/4/19  --will monitor and f/u repeat cbc

## 2019-10-23 NOTE — ED NOTES
Telemetry Verification   Patient placed on Telemetry Box  Verified on ED monitor  Box [id number]   Monitor Tech ED    Rate 80

## 2019-10-24 PROBLEM — I87.2 VENOUS STASIS DERMATITIS OF BOTH LOWER EXTREMITIES: Status: ACTIVE | Noted: 2019-10-24

## 2019-10-24 LAB
ANION GAP SERPL CALC-SCNC: 10 MMOL/L (ref 8–16)
BASOPHILS # BLD AUTO: 0.02 K/UL (ref 0–0.2)
BASOPHILS # BLD AUTO: 0.04 K/UL (ref 0–0.2)
BASOPHILS NFR BLD: 0.4 % (ref 0–1.9)
BASOPHILS NFR BLD: 0.9 % (ref 0–1.9)
BUN SERPL-MCNC: 27 MG/DL (ref 6–20)
CALCIUM SERPL-MCNC: 9.3 MG/DL (ref 8.7–10.5)
CHLORIDE SERPL-SCNC: 100 MMOL/L (ref 95–110)
CO2 SERPL-SCNC: 29 MMOL/L (ref 23–29)
CREAT SERPL-MCNC: 1.6 MG/DL (ref 0.5–1.4)
DIFFERENTIAL METHOD: ABNORMAL
DIFFERENTIAL METHOD: ABNORMAL
EOSINOPHIL # BLD AUTO: 0.3 K/UL (ref 0–0.5)
EOSINOPHIL # BLD AUTO: 0.4 K/UL (ref 0–0.5)
EOSINOPHIL NFR BLD: 5.6 % (ref 0–8)
EOSINOPHIL NFR BLD: 9.5 % (ref 0–8)
ERYTHROCYTE [DISTWIDTH] IN BLOOD BY AUTOMATED COUNT: 18.7 % (ref 11.5–14.5)
ERYTHROCYTE [DISTWIDTH] IN BLOOD BY AUTOMATED COUNT: 19.1 % (ref 11.5–14.5)
EST. GFR  (AFRICAN AMERICAN): 56.4 ML/MIN/1.73 M^2
EST. GFR  (NON AFRICAN AMERICAN): 48.8 ML/MIN/1.73 M^2
GLUCOSE SERPL-MCNC: 111 MG/DL (ref 70–110)
HCT VFR BLD AUTO: 21.9 % (ref 40–54)
HCT VFR BLD AUTO: 23.6 % (ref 40–54)
HGB BLD-MCNC: 6.3 G/DL (ref 14–18)
HGB BLD-MCNC: 6.7 G/DL (ref 14–18)
IMM GRANULOCYTES # BLD AUTO: 0.01 K/UL (ref 0–0.04)
IMM GRANULOCYTES # BLD AUTO: 0.02 K/UL (ref 0–0.04)
IMM GRANULOCYTES NFR BLD AUTO: 0.2 % (ref 0–0.5)
IMM GRANULOCYTES NFR BLD AUTO: 0.4 % (ref 0–0.5)
INR PPP: 5.4 (ref 0.8–1.2)
INR PPP: 5.5 (ref 0.8–1.2)
LYMPHOCYTES # BLD AUTO: 0.8 K/UL (ref 1–4.8)
LYMPHOCYTES # BLD AUTO: 1 K/UL (ref 1–4.8)
LYMPHOCYTES NFR BLD: 15.7 % (ref 18–48)
LYMPHOCYTES NFR BLD: 23.6 % (ref 18–48)
MAGNESIUM SERPL-MCNC: 1.9 MG/DL (ref 1.6–2.6)
MCH RBC QN AUTO: 21.5 PG (ref 27–31)
MCH RBC QN AUTO: 21.8 PG (ref 27–31)
MCHC RBC AUTO-ENTMCNC: 28.4 G/DL (ref 32–36)
MCHC RBC AUTO-ENTMCNC: 28.8 G/DL (ref 32–36)
MCV RBC AUTO: 75 FL (ref 82–98)
MCV RBC AUTO: 77 FL (ref 82–98)
MONOCYTES # BLD AUTO: 0.9 K/UL (ref 0.3–1)
MONOCYTES # BLD AUTO: 1.3 K/UL (ref 0.3–1)
MONOCYTES NFR BLD: 16.9 % (ref 4–15)
MONOCYTES NFR BLD: 28.9 % (ref 4–15)
NEUTROPHILS # BLD AUTO: 1.6 K/UL (ref 1.8–7.7)
NEUTROPHILS # BLD AUTO: 3.1 K/UL (ref 1.8–7.7)
NEUTROPHILS NFR BLD: 36.9 % (ref 38–73)
NEUTROPHILS NFR BLD: 61 % (ref 38–73)
NRBC BLD-RTO: 0 /100 WBC
NRBC BLD-RTO: 0 /100 WBC
PLATELET # BLD AUTO: 359 K/UL (ref 150–350)
PLATELET # BLD AUTO: 375 K/UL (ref 150–350)
PMV BLD AUTO: 9.4 FL (ref 9.2–12.9)
PMV BLD AUTO: 9.8 FL (ref 9.2–12.9)
POTASSIUM SERPL-SCNC: 3.6 MMOL/L (ref 3.5–5.1)
PROTHROMBIN TIME: 54.1 SEC (ref 9–12.5)
PROTHROMBIN TIME: 54.4 SEC (ref 9–12.5)
RBC # BLD AUTO: 2.93 M/UL (ref 4.6–6.2)
RBC # BLD AUTO: 3.08 M/UL (ref 4.6–6.2)
SODIUM SERPL-SCNC: 139 MMOL/L (ref 136–145)
WBC # BLD AUTO: 4.4 K/UL (ref 3.9–12.7)
WBC # BLD AUTO: 5.04 K/UL (ref 3.9–12.7)

## 2019-10-24 PROCEDURE — 99232 SBSQ HOSP IP/OBS MODERATE 35: CPT | Mod: ,,, | Performed by: INTERNAL MEDICINE

## 2019-10-24 PROCEDURE — 63600175 PHARM REV CODE 636 W HCPCS: Performed by: INTERNAL MEDICINE

## 2019-10-24 PROCEDURE — 99232 PR SUBSEQUENT HOSPITAL CARE,LEVL II: ICD-10-PCS | Mod: ,,, | Performed by: INTERNAL MEDICINE

## 2019-10-24 PROCEDURE — 36415 COLL VENOUS BLD VENIPUNCTURE: CPT

## 2019-10-24 PROCEDURE — 85610 PROTHROMBIN TIME: CPT

## 2019-10-24 PROCEDURE — 85025 COMPLETE CBC W/AUTO DIFF WBC: CPT

## 2019-10-24 PROCEDURE — 83735 ASSAY OF MAGNESIUM: CPT

## 2019-10-24 PROCEDURE — G0378 HOSPITAL OBSERVATION PER HR: HCPCS

## 2019-10-24 PROCEDURE — 97161 PT EVAL LOW COMPLEX 20 MIN: CPT

## 2019-10-24 PROCEDURE — 80048 BASIC METABOLIC PNL TOTAL CA: CPT

## 2019-10-24 PROCEDURE — 11000001 HC ACUTE MED/SURG PRIVATE ROOM

## 2019-10-24 PROCEDURE — 25000003 PHARM REV CODE 250: Performed by: INTERNAL MEDICINE

## 2019-10-24 PROCEDURE — 97165 OT EVAL LOW COMPLEX 30 MIN: CPT

## 2019-10-24 RX ORDER — LANOLIN ALCOHOL/MO/W.PET/CERES
400 CREAM (GRAM) TOPICAL ONCE
Status: COMPLETED | OUTPATIENT
Start: 2019-10-24 | End: 2019-10-24

## 2019-10-24 RX ORDER — MAGNESIUM SULFATE HEPTAHYDRATE 40 MG/ML
2 INJECTION, SOLUTION INTRAVENOUS ONCE
Status: DISCONTINUED | OUTPATIENT
Start: 2019-10-24 | End: 2019-10-24

## 2019-10-24 RX ORDER — POTASSIUM CHLORIDE 20 MEQ/15ML
40 SOLUTION ORAL ONCE
Status: DISCONTINUED | OUTPATIENT
Start: 2019-10-24 | End: 2019-10-26 | Stop reason: HOSPADM

## 2019-10-24 RX ADMIN — FUROSEMIDE 80 MG: 10 INJECTION, SOLUTION INTRAMUSCULAR; INTRAVENOUS at 07:10

## 2019-10-24 RX ADMIN — HYDRALAZINE HYDROCHLORIDE AND ISOSORBIDE DINITRATE 1 TABLET: 37.5; 2 TABLET, FILM COATED ORAL at 04:10

## 2019-10-24 RX ADMIN — HYDRALAZINE HYDROCHLORIDE AND ISOSORBIDE DINITRATE 1 TABLET: 37.5; 2 TABLET, FILM COATED ORAL at 10:10

## 2019-10-24 RX ADMIN — SODIUM CHLORIDE 125 MG: 9 INJECTION, SOLUTION INTRAVENOUS at 05:10

## 2019-10-24 RX ADMIN — METOPROLOL TARTRATE 25 MG: 25 TABLET, FILM COATED ORAL at 09:10

## 2019-10-24 RX ADMIN — GABAPENTIN 100 MG: 250 SOLUTION ORAL at 10:10

## 2019-10-24 RX ADMIN — FUROSEMIDE 80 MG: 10 INJECTION, SOLUTION INTRAMUSCULAR; INTRAVENOUS at 10:10

## 2019-10-24 RX ADMIN — GABAPENTIN 100 MG: 250 SOLUTION ORAL at 09:10

## 2019-10-24 RX ADMIN — HYDRALAZINE HYDROCHLORIDE AND ISOSORBIDE DINITRATE 1 TABLET: 37.5; 2 TABLET, FILM COATED ORAL at 09:10

## 2019-10-24 RX ADMIN — FERROUS SULFATE TAB EC 325 MG (65 MG FE EQUIVALENT) 325 MG: 325 (65 FE) TABLET DELAYED RESPONSE at 10:10

## 2019-10-24 RX ADMIN — PANTOPRAZOLE SODIUM 40 MG: 40 TABLET, DELAYED RELEASE ORAL at 10:10

## 2019-10-24 RX ADMIN — MAGNESIUM OXIDE TAB 400 MG (241.3 MG ELEMENTAL MG) 400 MG: 400 (241.3 MG) TAB at 10:10

## 2019-10-24 RX ADMIN — MIRTAZAPINE 7.5 MG: 7.5 TABLET ORAL at 09:10

## 2019-10-24 NOTE — ASSESSMENT & PLAN NOTE
--possibly due to orthostatic hypotension in the setting of severe anemia (hgb 6.8) and BB use vs hypoxemia vs cardiogenic (arrhythmia, valvular disease or ACS) vs TIA  --currently AAOx4 in ED with stable vital signs  --refusing blood transfusion, so will monitor for now  --TTE pending  --continue telemetry  --f/u orthostatics  --on 3L NC; will need 6 min walk test prior to DC home  --PT/OT consulted

## 2019-10-24 NOTE — ASSESSMENT & PLAN NOTE
--unclear what sats are on RA, nut currenltsylwia at goal on 3L NC  --says he is supposed to be on home oxygen, but his home concentrator does not work?  --no hx of COPD or asthma, says O2 is for his hx of CHF  --will plan on 6MWT prior to discharge  --wean off as tolerated

## 2019-10-24 NOTE — ASSESSMENT & PLAN NOTE
"--unclear etiology, but has been chronically anemic   --hgb 6.8 down from 7.3 on 8/4/19  --no site of active bleeding identified (no epistaxis, having light brown BMs, no hematuria)  --INR 4.9, down from 5.3 earlier on day of admission (per pt) without signs of active bleeding, so will monitor for now with daily INR and continue to hold warfarin. Will plan to reverse with Vit K in the setting of active bleeding.   --type and screen and repeat cbc ordered  --REFUSING blood transfusions for fear of nigel "AIDS". Tried to explain that the risk of dying from a brisk bleed in the setting of INR 4.9 is much higher than that of nigel HIV from a transfusion and even higher than that of actually dying from HIV. Still refused.  --anemia workup ordered--pt with low ferritin and Fe/ sat Fe suggestive of Fe def anemia  --Retic inappropriately low at 1.1  --continue Fe supplement- 1 dose IV today  --continue to monitor; if continues to downtrend will likely consult hematology for assistance  "

## 2019-10-24 NOTE — PROGRESS NOTES
Wound care consult for BLEs    PMH:    Patient with hemosiderin staining to the BLEs. Mild stasis dermatitis noted. No open wounds seen. Patient states he likes to wear Tubi .  Tubi G applied. No open wounds noted.     Discussed with Dr Cramer, recs approved. Will sign off as no open wounds noted.     Recommend:  Moisturize daily  Tubi  G (on pt) during day    Wound care to sign off. Please reconsult if we can be of further assistance.   Edna Kay RN [facility]   [phone]

## 2019-10-24 NOTE — PROGRESS NOTES
[facility] Hospital Medicine  Progress Note    Patient Name: Hamlet Terrell  MRN: [id number]  Patient Class: IP- Inpatient   Admission Date: 10/23/2019  Length of Stay: 1 days  Attending Physician: Lisa Rodriguez*  Primary Care Provider: Carlin Swift MD    [facility] Medicine Team: [facility] Lisa Rodriguez MD    Subjective:     Principal Problem:Acute on chronic combined systolic and diastolic heart failure        HPI:  53M combined CHF (last Echo 4/2019 with EF 23%, diastolic dysfuction, moderated MR and TR), HTN, chronic A. fib (on coumadin), A. flutter s/p ablation, CVA (in 2009), CAD, presented to ED for a syncopal episode earlier today.    Patient reports he got up to open a gate at his house when he suddenly lost consciousness for what he believes was 2-3 seconds. Denies head or body trauma. Did not experience lightheadedness, diaphoresis, chest pain, bowel/bladder incontinence or palpitations. Reports he has chronic dyspnea that limits his ability to ambulate, but states he is independent in his ADLs. He is not on home oxygen because his concentrator does not work. Reports medication compliance. Was taking warfarin 12mg po daily (last dose 7pm yesterday) and was told by his  nurse that his INR was 5.3 today. Denies taking new meds including abx or supplements, no melenic or bloody stools (last BM was light brown last night), no epistaxis or hematuria. Notes spitting up a blood clot 2 days ago. Has also been taking lasix 80mg PO BID. Denies worsening lower ext edema (although he reported that to ED provider), but states he has gained approx 25lbs over the past mth. No reported fevers, chills, dysuria, abdominal pain, cough, malaise or diarrhea.     In the ED pt with stable vital signs. On 3L NC with sats 97%. Labs significant for Hgb 6.8, INR 5.9, , Trop 0.057. CXR with edema. Given lasix 80mg IV x 1.         Overview/Hospital Course:  Admitted to  on  10/24. Remained HD stable overnight. Diuresed 1.8 liters. Still complaining of weakness and SOb on day 2. Hgb downtrended to 6.8>>6.3 without signs of active bleed. INR increased 4.9>>5.4. Pt continues to refuse blood products.     Interval History: HD stable overnight. Hgb downtrending 6.8>>6.3, INR uptrending 4.9>>5.4 without signs of active bleeding. Continues to refuse blood products. Refused TTE this morning bc he is too uncomfortable laying down. Tech has agreed to try again in the afternoon.    Review of Systems   Constitutional: Positive for fatigue. Negative for chills, diaphoresis and fever.   HENT: Negative for congestion, rhinorrhea and sinus pain.    Eyes: Negative for visual disturbance.   Respiratory: Positive for shortness of breath. Negative for cough, chest tightness and wheezing.    Cardiovascular: Positive for leg swelling. Negative for chest pain and palpitations.   Gastrointestinal: Negative for abdominal pain, blood in stool, constipation, diarrhea, nausea and vomiting.   Genitourinary: Negative for difficulty urinating, dysuria and hematuria.   Musculoskeletal: Negative for arthralgias, back pain and myalgias.   Skin: Negative for color change and pallor.   Neurological: Positive for syncope, weakness and light-headedness. Negative for facial asymmetry and headaches.   Psychiatric/Behavioral: Negative for behavioral problems and confusion.     Objective:     Vital Signs (Most Recent):  Temp: 98.2 °F (36.8 °C) (10/24/19 1111)  Pulse: 81 (10/24/19 1128)  Resp: 18 (10/24/19 1111)  BP: (!) 114/53 (10/24/19 1111)  SpO2: 100 % (10/24/19 1111) Vital Signs (24h Range):  Temp:  [96.2 °F (35.7 °C)-98.2 °F (36.8 °C)] 98.2 °F (36.8 °C)  Pulse:  [63-93] 81  Resp:  [12-18] 18  SpO2:  [100 %] 100 %  BP: (111-145)/(53-85) 114/53     Weight: 108.9 kg (240 lb)  Body mass index is 35.44 kg/m².    Intake/Output Summary (Last 24 hours) at 10/24/2019 1434  Last data filed at 10/24/2019 0600  Gross per 24 hour    Intake 360 ml   Output 1850 ml   Net -1490 ml      Physical Exam   Constitutional: He is oriented to person, place, and time. He appears well-developed. No distress.   obese   HENT:   Head: Normocephalic and atraumatic.   Eyes: Pupils are equal, round, and reactive to light. Conjunctivae and EOM are normal.   Neck: Normal range of motion. Neck supple.   Cardiovascular: Normal rate.   No murmur heard.  Irregularly irregular rhythm   Pulmonary/Chest: Effort normal and breath sounds normal. No respiratory distress. He has no wheezes.   2L NC   Abdominal: Soft. Bowel sounds are normal. He exhibits no distension. There is no tenderness.   Musculoskeletal: Normal range of motion. He exhibits edema (non-pitting). He exhibits no tenderness.   Bila LE with chronic venous stasis changes   Neurological: He is alert and oriented to person, place, and time. No cranial nerve deficit.   Skin: Skin is warm and dry. He is not diaphoretic.   Psychiatric: He has a normal mood and affect. His behavior is normal.       Significant Labs: All pertinent labs within the past 24 hours have been reviewed.    Significant Imaging: I have reviewed and interpreted all pertinent imaging results/findings within the past 24 hours.      Assessment/Plan:      * Acute on chronic combined systolic and diastolic heart failure  --EF 23% per most recent TTE 4/2019  --BNP only mildly elevated at 243, but may be low in obese pts  --CXR and lower extremities with edema  --s/p lasix 80mg IV in ED, will continue with BID dosing for now; uptitrate as needed for goal output 1-2L /day  --f/u repeat TTE  --strict I/O, daily weights  --fluid restrict 1.5L /day  --monitor renal function    Acute hypoxemic respiratory failure  --unclear what sats are on RA, nut currenlty at goal on 3L NC  --says he is supposed to be on home oxygen, but his home concentrator does not work?  --no hx of COPD or asthma, says O2 is for his hx of CHF  --will plan on 6MWT prior to  "discharge  --wean off as tolerated      Microcytic anemia  --unclear etiology, but has been chronically anemic   --hgb 6.8 down from 7.3 on 8/4/19  --no site of active bleeding identified (no epistaxis, having light brown BMs, no hematuria)  --INR 4.9, down from 5.3 earlier on day of admission (per pt) without signs of active bleeding, so will monitor for now with daily INR and continue to hold warfarin. Will plan to reverse with Vit K in the setting of active bleeding.   --type and screen and repeat cbc ordered  --REFUSING blood transfusions for fear of nigel "AIDS". Tried to explain that the risk of dying from a brisk bleed in the setting of INR 4.9 is much higher than that of nigel HIV from a transfusion and even higher than that of actually dying from HIV. Still refused.  --anemia workup ordered--pt with low ferritin and Fe/ sat Fe suggestive of Fe def anemia  --Retic inappropriately low at 1.1  --continue Fe supplement- 1 dose IV today  --continue to monitor; if continues to downtrend will likely consult hematology for assistance    Venous stasis ulcer  --no skin breakdown present, just chronic skin changes  --woundcare following; appreciate assistance      Stage 3 chronic kidney disease  --Cr 1.6; stable  --monitor with daily bmp  --avoid nephrotoxic agents and renally dose meds      Subtherapeutic international normalized ratio (INR)  --unclear why INR elevated. 4.9 on admission, now 5.3.  --reports med compliance; currently on warfarin 12mg po daily. says he's been on same dose since July.  --denies new meds, taking wrong dose or GI issues  --no signs of active bleeding, so no indication to reverse at this time  --however. hgb 6.8, down from 7.3 on 8/4/19  --will monitor       Syncope  --possibly due to orthostatic hypotension in the setting of severe anemia (hgb 6.8) and BB use vs hypoxemia vs cardiogenic (arrhythmia, valvular disease or ACS) vs TIA  --currently AAOx4 in ED with stable vital " signs  --refusing blood transfusion, so will monitor for now  --TTE pending  --continue telemetry  --f/u orthostatics  --on 3L NC; will need 6 min walk test prior to DC home  --PT/OT consulted      Essential hypertension  Hypertensive Urgency- resolved  --SBP> 180 in ED without signs of end organ damage  --resume home isosorbide-hydralazine   --monitor      Atrial fibrillation, chronic  --supratherapeutic INR 5.9, so hold warfarin for now  --resume BB; uptitrate as needed  --telemetry    Chronic anticoagulation  --on warfarin for afib  --hold warfarin for supratherapeutic INR        VTE Risk Mitigation (From admission, onward)         Ordered     Place sequential compression device  Until discontinued      10/23/19 1533     Reason for No Pharmacological VTE Prophylaxis  Once      10/23/19 1533     IP VTE HIGH RISK PATIENT  Once      10/23/19 1533     Place CAMI hose  Until discontinued      10/23/19 1439                Dispo: pending clinical stability and PT/OT recs      Lisa Rodriguez MD  Department of Hospital Medicine   [facility]

## 2019-10-24 NOTE — PT/OT/SLP EVAL
"Occupational Therapy   Evaluation/Discharge Summary    Name: Hamlet Terrell  MRN: [id number]  Admitting Diagnosis:  Acute on chronic combined systolic and diastolic heart failure      Recommendations:     Discharge Recommendations: home  Discharge Equipment Recommendations:  none  Barriers to discharge:  Decreased caregiver support    Assessment:     Hamlet Terrell is a 53 y.o. male with a medical diagnosis of Acute on chronic combined systolic and diastolic heart failure.  Patient refusing to participate in OT evaluation and continued to report that he was "too tired". Patient agreed to providing living environment in history, but declined any ADL or functional mobility tasks. Patient will be discharged from acute OT services due to refusing therapy services. Re-consult if patient is willing to participate or if situation changes.     Plan:     · Patient discharged from acute OT services.    Subjective     Chief Complaint: fatigue  Patient/Family Comments/goals: none at this time    Occupational Profile:  Living Environment: Patient lives alone in a [facility] with 3 ANAID and no handrails. Patient reports he was independent with ADLs and functional mobility PTA. Patient owns no DME.  Equipment Used at Home:  oxygen    Pain/Comfort:  · Pain Rating 1: 0/10  · Pain Rating Post-Intervention 1: 0/10    Patients cultural, spiritual, Pentecostal conflicts given the current situation: no    Objective:     Communicated with: nurse and patient prior to session.  Patient found up in chair with oxygen upon OT entry to room.    General Precautions: Standard, fall   Orthopedic Precautions:N/A   Braces: N/A     Occupational Performance:    Bed Mobility:    · Patient was found up in chair    Functional Mobility/Transfers:  · Patient refused all transfers and sit<>stands reporting that he is too tired.    Activities of Daily Living:  · Patient adamantly declined demonstrating ability to perform ADL tasks reporting that he is currently able " to perform them independently.     [facility] 6 Click ADL:  AMPAC Total Score: 24    Treatment & Education:  Patient adamantly declined any exams or any formal assessments  Education:    Role of OT and POC  Importance of OOB activity  Discharge recommendations    Patient left up in chair with call button in reach, nurse notified and all needs met.     GOALS:   Multidisciplinary Problems     Occupational Therapy Goals     Not on file          Multidisciplinary Problems (Resolved)        Problem: Occupational Therapy Goal    Goal Priority Disciplines Outcome Interventions   Occupational Therapy Goal   (Resolved)     OT, PT/OT Met                    History:     Past Medical History:   Diagnosis Date    Anticoagulant long-term use     Cardiomegaly     Chronic combined systolic and diastolic congestive heart failure     Coronary artery disease     Heart attack 2006    Hypertension     Hyperthyroidism, subclinical 1/2/2013    MI (myocardial infarction) 9/22/2013    MI in 2009      Paroxysmal atrial fibrillation     PE (pulmonary embolism) 1/1/2013    IN 2010     S/P ablation of atrial flutter 2008    Stroke 2009    no residual weaknesses       Past Surgical History:   Procedure Laterality Date    RADIOFREQUENCY ABLATION  01/08/2008    for atrial flutter       Time Tracking:     OT Date of Treatment: 10/24/19  OT Start Time: 0933  OT Stop Time: 0941  OT Total Time (min): 8 min    Billable Minutes:Evaluation 8    DHRUV Vargas  10/24/2019

## 2019-10-24 NOTE — PLAN OF CARE
10/24/19 1502   Discharge Assessment   Assessment Type Discharge Planning Assessment   Confirmed/corrected address and phone number on facesheet? Yes   Assessment information obtained from? Patient   Expected Length of Stay (days) 3   Communicated expected length of stay with patient/caregiver yes   Prior to hospitilization cognitive status: Alert/Oriented   Prior to hospitalization functional status: Independent   Current cognitive status: Alert/Oriented   Current Functional Status: Independent   Lives With alone   Able to Return to Prior Arrangements yes   Is patient able to care for self after discharge? Unable to determine at this time (comments)   Who are your caregiver(s) and their phone number(s)? Kris Terrell brother [phone]   Patient's perception of discharge disposition home or selfcare   Patient currently being followed by outpatient case management? Unable to determine (comments)   Patient currently receives any other outside agency services? No   Equipment Currently Used at Home none   Do you have any problems affording any of your prescribed medications? No   Is the patient taking medications as prescribed? yes   Does the patient have transportation home? No  (request medicaid transportation)   Dialysis Name and Scheduled days N/A   Does the patient receive services at the [facility]? No   Discharge Plan A Home   Discharge Plan B Rehab   DME Needed Upon Discharge  none   Patient/Family in Agreement with Plan yes

## 2019-10-24 NOTE — PT/OT/SLP EVAL
"Physical Therapy Evaluation and Discharge Note    Patient Name:  Hamlet Terrell   MRN:  [id number]    Recommendations:     Discharge Recommendations:  home   Discharge Equipment Recommendations: none   Barriers to discharge: None    Assessment:     Hamlet Terrell is a 53 y.o. male admitted with a medical diagnosis of Acute on chronic combined systolic and diastolic heart failure. At this time, patient does not require further acute PT services. Pt previously refusing medical care during this admission and refusing to participate in formal therapy eval on this date only stating "too tired". Pt willing to provide PLOF and living environment but despite Max encouragement pt refuses to demonstrate any functional mobility. Pt extensively educated that therapy will discharge orders as pt has refused care and currently refusing therapy. Pt agreeable to this plan and has no further therapy needs identified on this date.     Recent Surgery: * No surgery found *      Plan:     During this hospitalization, patient does not require further acute PT services.  Please re-consult if situation changes.      Subjective     Chief Complaint: fatigued   Patient/Family Comments/goals: "too tired"  Pain/Comfort:  ·      Patients cultural, spiritual, Moravian conflicts given the current situation: no    Living Environment:  Pt lives alone in [facility] with 3 ANAID and no handrails.   Prior to admission, patients level of function was Ind but with MUÑOZ.  Equipment used at home: oxygen.  DME owned (not currently used): none.      Objective:     Communicated with RN prior to session.  Patient found up in chair with oxygen upon PT entry to room.    General Precautions: Standard, fall   Orthopedic Precautions:N/A   Braces: N/A     Exams:  · Refused     Functional Mobility:  · Refused     AM-PAC 6 CLICK MOBILITY  Total Score:        Therapeutic Activities and Exercises:   - Pt educated on:   -PT roles, expectations, and POC    -Benefits of OOB " activities to increase strength and functional mobility    -Discharge recommendations     AM-PAC 6 CLICK MOBILITY  Total Score:      Patient left up in chair with all lines intact and call button in reach.    GOALS:   Multidisciplinary Problems     Physical Therapy Goals     Not on file          Multidisciplinary Problems (Resolved)        Problem: Physical Therapy Goal    Goal Priority Disciplines Outcome Goal Variances Interventions   Physical Therapy Goal   (Resolved)     PT, PT/OT Met                     History:     Past Medical History:   Diagnosis Date    Anticoagulant long-term use     Cardiomegaly     Chronic combined systolic and diastolic congestive heart failure     Coronary artery disease     Heart attack 2006    Hypertension     Hyperthyroidism, subclinical 1/2/2013    MI (myocardial infarction) 9/22/2013    MI in 2009      Paroxysmal atrial fibrillation     PE (pulmonary embolism) 1/1/2013    IN 2010     S/P ablation of atrial flutter 2008    Stroke 2009    no residual weaknesses       Past Surgical History:   Procedure Laterality Date    RADIOFREQUENCY ABLATION  01/08/2008    for atrial flutter       Time Tracking:     PT Received On: 10/24/19  PT Start Time: 0933     PT Stop Time: 0942  PT Total Time (min): 9 min     Billable Minutes: Evaluation 9      Kevin Motley, PT  10/24/2019

## 2019-10-24 NOTE — ASSESSMENT & PLAN NOTE
Hypertensive Urgency- resolved  --SBP> 180 in ED without signs of end organ damage  --resume home isosorbide-hydralazine   --monitor

## 2019-10-24 NOTE — ASSESSMENT & PLAN NOTE
--unclear why INR elevated. 4.9 on admission, now 5.3.  --reports med compliance; currently on warfarin 12mg po daily. says he's been on same dose since July.  --denies new meds, taking wrong dose or GI issues  --no signs of active bleeding, so no indication to reverse at this time  --however. hgb 6.8, down from 7.3 on 8/4/19  --will monitor

## 2019-10-24 NOTE — SUBJECTIVE & OBJECTIVE
Interval History: HD stable overnight. Hgb downtrending 6.8>>6.3, INR uptrending 4.9>>5.4 without signs of active bleeding. Continues to refuse blood products. Refused TTE this morning bc he is too uncomfortable laying down. Tech has agreed to try again in the afternoon.    Review of Systems   Constitutional: Positive for fatigue. Negative for chills, diaphoresis and fever.   HENT: Negative for congestion, rhinorrhea and sinus pain.    Eyes: Negative for visual disturbance.   Respiratory: Positive for shortness of breath. Negative for cough, chest tightness and wheezing.    Cardiovascular: Positive for leg swelling. Negative for chest pain and palpitations.   Gastrointestinal: Negative for abdominal pain, blood in stool, constipation, diarrhea, nausea and vomiting.   Genitourinary: Negative for difficulty urinating, dysuria and hematuria.   Musculoskeletal: Negative for arthralgias, back pain and myalgias.   Skin: Negative for color change and pallor.   Neurological: Positive for syncope, weakness and light-headedness. Negative for facial asymmetry and headaches.   Psychiatric/Behavioral: Negative for behavioral problems and confusion.     Objective:     Vital Signs (Most Recent):  Temp: 98.2 °F (36.8 °C) (10/24/19 1111)  Pulse: 81 (10/24/19 1128)  Resp: 18 (10/24/19 1111)  BP: (!) 114/53 (10/24/19 1111)  SpO2: 100 % (10/24/19 1111) Vital Signs (24h Range):  Temp:  [96.2 °F (35.7 °C)-98.2 °F (36.8 °C)] 98.2 °F (36.8 °C)  Pulse:  [63-93] 81  Resp:  [12-18] 18  SpO2:  [100 %] 100 %  BP: (111-145)/(53-85) 114/53     Weight: 108.9 kg (240 lb)  Body mass index is 35.44 kg/m².    Intake/Output Summary (Last 24 hours) at 10/24/2019 1434  Last data filed at 10/24/2019 0600  Gross per 24 hour   Intake 360 ml   Output 1850 ml   Net -1490 ml      Physical Exam   Constitutional: He is oriented to person, place, and time. He appears well-developed. No distress.   obese   HENT:   Head: Normocephalic and atraumatic.   Eyes: Pupils  are equal, round, and reactive to light. Conjunctivae and EOM are normal.   Neck: Normal range of motion. Neck supple.   Cardiovascular: Normal rate.   No murmur heard.  Irregularly irregular rhythm   Pulmonary/Chest: Effort normal and breath sounds normal. No respiratory distress. He has no wheezes.   2L NC   Abdominal: Soft. Bowel sounds are normal. He exhibits no distension. There is no tenderness.   Musculoskeletal: Normal range of motion. He exhibits edema (non-pitting). He exhibits no tenderness.   Bila LE with chronic venous stasis changes   Neurological: He is alert and oriented to person, place, and time. No cranial nerve deficit.   Skin: Skin is warm and dry. He is not diaphoretic.   Psychiatric: He has a normal mood and affect. His behavior is normal.       Significant Labs: All pertinent labs within the past 24 hours have been reviewed.    Significant Imaging: I have reviewed and interpreted all pertinent imaging results/findings within the past 24 hours.

## 2019-10-24 NOTE — CARE UPDATE
Rounds with [facility]- Dr. Jesica Bahena:    Pt previously refused echo d/t being unable to lie flat. Swelling noted to BLE. Pt refuses blood transfusion despite education by MD. Plan is to decrease risk of iatrogenic anemia. Echo dept contacted to reschedule echo, plan is to reorder and have it done at approx 1430. Pt agreed to new time.

## 2019-10-24 NOTE — HOSPITAL COURSE
Admitted to  on 10/24. Remained HD stable overnight. Diuresed 1.8 liters. Still complaining of weakness and SOb on day 2. Hgb downtrended to 6.8>>6.3 without signs of active bleed. INR increased 4.9>>5.4. Pt continues to refuse blood products. Anemia workup consistent with Fe def anemia. Given 1 dose IV ferlicit to assist with count recovery. Refused TTE twice, which had been ordered as part of syncope workup. On day 3 INR finally downtrended (4.4), Hgb stable (6.3) without signs of active bleeding. No BMs, so FOBT not collected. Pt breathing comfortably on RA during encounter. 6 Min Walk Test ordered, but sats at goal at rest and with exertion. Refusing telemetry and vital signs. Continuing to diurese. Renal function stable. On day 4 INR 2.5. Discussed with pharmacy and will start warfarin at 5mg. H/H stable at 6.3. FOBT again positive. Had a long conversation with pt regarding his long term goals given that he continues to refuse tx, He states that he would like to get better and is now interested in undergoing screening colonoscopy outpatient. He says he refused ICD placement in the past and he still does not want it. However, is open to seeing cardiology outpt. Pt medically stable for discharge today with . INR lab to be drawn 10/28 to reassess INR level.

## 2019-10-24 NOTE — ASSESSMENT & PLAN NOTE
--no skin breakdown present, just chronic skin changes  --woundcare following; appreciate assistance

## 2019-10-24 NOTE — ASSESSMENT & PLAN NOTE
--EF 23% per most recent TTE 4/2019  --BNP only mildly elevated at 243, but may be low in obese pts  --CXR and lower extremities with edema  --s/p lasix 80mg IV in ED, will continue with BID dosing for now; uptitrate as needed for goal output 1-2L /day  --f/u repeat TTE  --strict I/O, daily weights  --fluid restrict 1.5L /day  --monitor renal function

## 2019-10-24 NOTE — PLAN OF CARE
No goals set, pt does not require additional acute PT services at this time.     Pt educated on asking medical staff for PT consult if changes in functional status occurs.     - Tomasz Motley, PT, DPT  10/24/2019

## 2019-10-24 NOTE — PLAN OF CARE
Problem: Occupational Therapy Goal  Goal: Occupational Therapy Goal  Outcome: Met  Evaluation/Discharge. Patient has no acute OT needs at this time and patient refusing therapy. Re-consult if situation changes.   DHRUV Vargas  10/24/2019

## 2019-10-25 LAB
ANION GAP SERPL CALC-SCNC: 10 MMOL/L (ref 8–16)
BASOPHILS # BLD AUTO: 0.02 K/UL (ref 0–0.2)
BASOPHILS NFR BLD: 0.4 % (ref 0–1.9)
BUN SERPL-MCNC: 27 MG/DL (ref 6–20)
CALCIUM SERPL-MCNC: 9.1 MG/DL (ref 8.7–10.5)
CHLORIDE SERPL-SCNC: 103 MMOL/L (ref 95–110)
CO2 SERPL-SCNC: 29 MMOL/L (ref 23–29)
CREAT SERPL-MCNC: 1.6 MG/DL (ref 0.5–1.4)
DIFFERENTIAL METHOD: ABNORMAL
EOSINOPHIL # BLD AUTO: 0.3 K/UL (ref 0–0.5)
EOSINOPHIL NFR BLD: 5.4 % (ref 0–8)
ERYTHROCYTE [DISTWIDTH] IN BLOOD BY AUTOMATED COUNT: 18.8 % (ref 11.5–14.5)
EST. GFR  (AFRICAN AMERICAN): 56.4 ML/MIN/1.73 M^2
EST. GFR  (NON AFRICAN AMERICAN): 48.8 ML/MIN/1.73 M^2
GLUCOSE SERPL-MCNC: 95 MG/DL (ref 70–110)
HCT VFR BLD AUTO: 22.7 % (ref 40–54)
HGB BLD-MCNC: 6.3 G/DL (ref 14–18)
IMM GRANULOCYTES # BLD AUTO: 0.02 K/UL (ref 0–0.04)
IMM GRANULOCYTES NFR BLD AUTO: 0.4 % (ref 0–0.5)
INR PPP: 4.4 (ref 0.8–1.2)
LYMPHOCYTES # BLD AUTO: 1 K/UL (ref 1–4.8)
LYMPHOCYTES NFR BLD: 18.4 % (ref 18–48)
MAGNESIUM SERPL-MCNC: 1.9 MG/DL (ref 1.6–2.6)
MCH RBC QN AUTO: 21.4 PG (ref 27–31)
MCHC RBC AUTO-ENTMCNC: 27.8 G/DL (ref 32–36)
MCV RBC AUTO: 77 FL (ref 82–98)
MONOCYTES # BLD AUTO: 0.9 K/UL (ref 0.3–1)
MONOCYTES NFR BLD: 18 % (ref 4–15)
NEUTROPHILS # BLD AUTO: 3 K/UL (ref 1.8–7.7)
NEUTROPHILS NFR BLD: 57.4 % (ref 38–73)
NRBC BLD-RTO: 0 /100 WBC
OB PNL STL: POSITIVE
PLATELET # BLD AUTO: 363 K/UL (ref 150–350)
PMV BLD AUTO: 9.6 FL (ref 9.2–12.9)
POTASSIUM SERPL-SCNC: 3.7 MMOL/L (ref 3.5–5.1)
PROTHROMBIN TIME: 43.2 SEC (ref 9–12.5)
RBC # BLD AUTO: 2.94 M/UL (ref 4.6–6.2)
SODIUM SERPL-SCNC: 142 MMOL/L (ref 136–145)
WBC # BLD AUTO: 5.22 K/UL (ref 3.9–12.7)

## 2019-10-25 PROCEDURE — 11000001 HC ACUTE MED/SURG PRIVATE ROOM

## 2019-10-25 PROCEDURE — 25000003 PHARM REV CODE 250: Performed by: INTERNAL MEDICINE

## 2019-10-25 PROCEDURE — 85025 COMPLETE CBC W/AUTO DIFF WBC: CPT

## 2019-10-25 PROCEDURE — 80048 BASIC METABOLIC PNL TOTAL CA: CPT

## 2019-10-25 PROCEDURE — 99232 SBSQ HOSP IP/OBS MODERATE 35: CPT | Mod: ,,, | Performed by: INTERNAL MEDICINE

## 2019-10-25 PROCEDURE — 36415 COLL VENOUS BLD VENIPUNCTURE: CPT

## 2019-10-25 PROCEDURE — 83735 ASSAY OF MAGNESIUM: CPT

## 2019-10-25 PROCEDURE — 63600175 PHARM REV CODE 636 W HCPCS: Performed by: INTERNAL MEDICINE

## 2019-10-25 PROCEDURE — 94761 N-INVAS EAR/PLS OXIMETRY MLT: CPT

## 2019-10-25 PROCEDURE — 99232 PR SUBSEQUENT HOSPITAL CARE,LEVL II: ICD-10-PCS | Mod: ,,, | Performed by: INTERNAL MEDICINE

## 2019-10-25 PROCEDURE — 85610 PROTHROMBIN TIME: CPT

## 2019-10-25 PROCEDURE — G0378 HOSPITAL OBSERVATION PER HR: HCPCS

## 2019-10-25 PROCEDURE — 82272 OCCULT BLD FECES 1-3 TESTS: CPT

## 2019-10-25 RX ORDER — POTASSIUM CHLORIDE 20 MEQ/15ML
20 SOLUTION ORAL ONCE
Status: DISCONTINUED | OUTPATIENT
Start: 2019-10-25 | End: 2019-10-26 | Stop reason: HOSPADM

## 2019-10-25 RX ORDER — WARFARIN 4 MG/1
8 TABLET ORAL DAILY
Qty: 60 TABLET | Refills: 0 | Status: SHIPPED | OUTPATIENT
Start: 2019-10-25 | End: 2019-10-26 | Stop reason: SDUPTHER

## 2019-10-25 RX ORDER — FUROSEMIDE 10 MG/ML
80 INJECTION INTRAMUSCULAR; INTRAVENOUS 3 TIMES DAILY
Status: DISCONTINUED | OUTPATIENT
Start: 2019-10-25 | End: 2019-10-26

## 2019-10-25 RX ORDER — LANOLIN ALCOHOL/MO/W.PET/CERES
800 CREAM (GRAM) TOPICAL ONCE
Status: COMPLETED | OUTPATIENT
Start: 2019-10-25 | End: 2019-10-25

## 2019-10-25 RX ADMIN — METOPROLOL TARTRATE 25 MG: 25 TABLET, FILM COATED ORAL at 09:10

## 2019-10-25 RX ADMIN — GABAPENTIN 100 MG: 250 SOLUTION ORAL at 09:10

## 2019-10-25 RX ADMIN — PANTOPRAZOLE SODIUM 40 MG: 40 TABLET, DELAYED RELEASE ORAL at 09:10

## 2019-10-25 RX ADMIN — FUROSEMIDE 80 MG: 10 INJECTION, SOLUTION INTRAMUSCULAR; INTRAVENOUS at 06:10

## 2019-10-25 RX ADMIN — FERROUS SULFATE TAB EC 325 MG (65 MG FE EQUIVALENT) 325 MG: 325 (65 FE) TABLET DELAYED RESPONSE at 09:10

## 2019-10-25 RX ADMIN — HYDRALAZINE HYDROCHLORIDE AND ISOSORBIDE DINITRATE 1 TABLET: 37.5; 2 TABLET, FILM COATED ORAL at 09:10

## 2019-10-25 RX ADMIN — HYDRALAZINE HYDROCHLORIDE AND ISOSORBIDE DINITRATE 1 TABLET: 37.5; 2 TABLET, FILM COATED ORAL at 06:10

## 2019-10-25 RX ADMIN — MAGNESIUM OXIDE TAB 400 MG (241.3 MG ELEMENTAL MG) 800 MG: 400 (241.3 MG) TAB at 09:10

## 2019-10-25 RX ADMIN — FUROSEMIDE 80 MG: 10 INJECTION, SOLUTION INTRAMUSCULAR; INTRAVENOUS at 09:10

## 2019-10-25 NOTE — ASSESSMENT & PLAN NOTE
"Fe deficiency anemia  --Chronic problem - +FOBT during last admission and was supposed to have outpatient colonoscopy  --hgb 6.8 down from 7.3 on 8/4/19  --no site of active bleeding identified (no epistaxis, having light brown BMs, no hematuria)  --INR 4.9, down from 5.3 earlier on day of admission (per pt) without signs of active bleeding, so will monitor for now with daily INR and continue to hold warfarin. Will plan to reverse with Vit K in the setting of active bleeding.   --type and screen and repeat cbc ordered  --REFUSING blood transfusions for fear of nigel "AIDS". Tried to explain that the risk of dying from a brisk bleed in the setting of INR 4.9 is much higher than that of nigel HIV from a transfusion and even higher than that of actually dying from HIV. Still refused.  --anemia workup ordered--pt with low ferritin and Fe/ sat Fe suggestive of Fe def anemia  --Retic inappropriately low at 1.1  --continue Fe supplement- 1 dose IV 10/24/19 to assist with count recovery  --continue to monitor; if continues to downtrend will likely consult hematology for assistance  --stable today  "

## 2019-10-25 NOTE — SUBJECTIVE & OBJECTIVE
Interval History: see hospital course    Review of Systems   Constitutional: Positive for fatigue. Negative for chills, diaphoresis and fever.   HENT: Negative for congestion, rhinorrhea and sinus pain.    Eyes: Negative for visual disturbance.   Respiratory: Positive for shortness of breath. Negative for cough, chest tightness and wheezing.    Cardiovascular: Positive for leg swelling. Negative for chest pain and palpitations.   Gastrointestinal: Negative for abdominal pain, blood in stool, constipation, diarrhea, nausea and vomiting.   Genitourinary: Negative for difficulty urinating, dysuria and hematuria.   Musculoskeletal: Negative for arthralgias, back pain and myalgias.   Skin: Negative for color change and pallor.   Neurological: Positive for syncope, weakness and light-headedness. Negative for facial asymmetry and headaches.   Psychiatric/Behavioral: Negative for behavioral problems and confusion.     Objective:     Vital Signs (Most Recent):  Temp: 97.8 °F (36.6 °C) (10/24/19 2000)  Pulse: 79 (10/24/19 2319)  Resp: 18 (10/24/19 2000)  BP: (!) 114/53 (10/25/19 0321)  SpO2: 99 % (10/24/19 2000) Vital Signs (24h Range):  Temp:  [97.8 °F (36.6 °C)-98.2 °F (36.8 °C)] 97.8 °F (36.6 °C)  Pulse:  [] 79  Resp:  [18] 18  SpO2:  [99 %-100 %] 99 %  BP: (114)/(53) 114/53     Weight: (patient refused)  Body mass index is 35.44 kg/m².    Intake/Output Summary (Last 24 hours) at 10/25/2019 1055  Last data filed at 10/24/2019 1800  Gross per 24 hour   Intake 480 ml   Output 375 ml   Net 105 ml      Physical Exam   Constitutional: He is oriented to person, place, and time. He appears well-developed. No distress.   obese   HENT:   Head: Normocephalic and atraumatic.   Eyes: Pupils are equal, round, and reactive to light. Conjunctivae and EOM are normal.   Neck: Normal range of motion. Neck supple.   Cardiovascular: Normal rate.   No murmur heard.  Irregularly irregular rhythm   Pulmonary/Chest: Effort normal and breath  sounds normal. No respiratory distress. He has no wheezes.   Abdominal: Soft. Bowel sounds are normal. He exhibits no distension. There is no tenderness.   Musculoskeletal: Normal range of motion. He exhibits edema. He exhibits no tenderness.   Neurological: He is alert and oriented to person, place, and time. No cranial nerve deficit.   Skin: Skin is warm and dry. He is not diaphoretic.   Psychiatric: He has a normal mood and affect. His behavior is normal.       Significant Labs: All pertinent labs within the past 24 hours have been reviewed.    Significant Imaging: I have reviewed and interpreted all pertinent imaging results/findings within the past 24 hours.

## 2019-10-25 NOTE — ASSESSMENT & PLAN NOTE
--unclear why INR elevated. 4.9 on admission, now 5.3.  --reports med compliance; currently on warfarin 12mg po daily. says he's been on same dose since July.  --denies new meds, taking wrong dose or GI issues  --no signs of active bleeding, so no indication to reverse at this time  --however. hgb 6.8, down from 7.3 on 8/4/19  --downtrending (4.4); hgb stable  --will monitor overnight and likely DC tomorrow if INR still downtrending

## 2019-10-25 NOTE — PROGRESS NOTES
[facility] Hospital Medicine  Progress Note    Patient Name: Hamlet Terrell  MRN: [id number]  Patient Class: IP- Inpatient   Admission Date: 10/23/2019  Length of Stay: 2 days  Attending Physician: Lisa Rodriguez*  Primary Care Provider: Carlin Swift MD    [facility] Medicine Team: [facility] Lisa Rodriguez MD    Subjective:     Principal Problem:Acute on chronic combined systolic and diastolic heart failure        HPI:  53M combined CHF (last Echo 4/2019 with EF 23%, diastolic dysfuction, moderated MR and TR), HTN, chronic A. fib (on coumadin), A. flutter s/p ablation, CVA (in 2009), CAD, presented to ED for a syncopal episode earlier today.    Patient reports he got up to open a gate at his house when he suddenly lost consciousness for what he believes was 2-3 seconds. Denies head or body trauma. Did not experience lightheadedness, diaphoresis, chest pain, bowel/bladder incontinence or palpitations. Reports he has chronic dyspnea that limits his ability to ambulate, but states he is independent in his ADLs. He is not on home oxygen because his concentrator does not work. Reports medication compliance. Was taking warfarin 12mg po daily (last dose 7pm yesterday) and was told by his  nurse that his INR was 5.3 today. Denies taking new meds including abx or supplements, no melenic or bloody stools (last BM was light brown last night), no epistaxis or hematuria. Notes spitting up a blood clot 2 days ago. Has also been taking lasix 80mg PO BID. Denies worsening lower ext edema (although he reported that to ED provider), but states he has gained approx 25lbs over the past mth. No reported fevers, chills, dysuria, abdominal pain, cough, malaise or diarrhea.     In the ED pt with stable vital signs. On 3L NC with sats 97%. Labs significant for Hgb 6.8, INR 5.9, , Trop 0.057. CXR with edema. Given lasix 80mg IV x 1.         Overview/Hospital Course:   Admitted to  on  10/24. Remained HD stable overnight. Diuresed 1.8 liters. Still complaining of weakness and SOb on day 2. Hgb downtrended to 6.8>>6.3 without signs of active bleed. INR increased 4.9>>5.4. Pt continues to refuse blood products. Refused TTE twice, which had been ordered as part of syncope workup. On day 3 INR finally downtrended (4.4), Hgb stable (6.3) without signs of active bleeding. No BMs, so FOBT not collected. Pt breathing comfortably on RA during encounter. 6 Min Walk Test ordered. Refusing telemetry and vital signs. Continuing to diurese. Renal function stable.    Interval History: see hospital course    Review of Systems   Constitutional: Positive for fatigue. Negative for chills, diaphoresis and fever.   HENT: Negative for congestion, rhinorrhea and sinus pain.    Eyes: Negative for visual disturbance.   Respiratory: Positive for shortness of breath. Negative for cough, chest tightness and wheezing.    Cardiovascular: Positive for leg swelling. Negative for chest pain and palpitations.   Gastrointestinal: Negative for abdominal pain, blood in stool, constipation, diarrhea, nausea and vomiting.   Genitourinary: Negative for difficulty urinating, dysuria and hematuria.   Musculoskeletal: Negative for arthralgias, back pain and myalgias.   Skin: Negative for color change and pallor.   Neurological: Positive for syncope, weakness and light-headedness. Negative for facial asymmetry and headaches.   Psychiatric/Behavioral: Negative for behavioral problems and confusion.     Objective:     Vital Signs (Most Recent):  Temp: 97.8 °F (36.6 °C) (10/24/19 2000)  Pulse: 79 (10/24/19 2319)  Resp: 18 (10/24/19 2000)  BP: (!) 114/53 (10/25/19 0321)  SpO2: 99 % (10/24/19 2000) Vital Signs (24h Range):  Temp:  [97.8 °F (36.6 °C)-98.2 °F (36.8 °C)] 97.8 °F (36.6 °C)  Pulse:  [] 79  Resp:  [18] 18  SpO2:  [99 %-100 %] 99 %  BP: (114)/(53) 114/53     Weight: (patient refused)  Body mass index is 35.44  kg/m².    Intake/Output Summary (Last 24 hours) at 10/25/2019 1055  Last data filed at 10/24/2019 1800  Gross per 24 hour   Intake 480 ml   Output 375 ml   Net 105 ml      Physical Exam   Constitutional: He is oriented to person, place, and time. He appears well-developed. No distress.   obese   HENT:   Head: Normocephalic and atraumatic.   Eyes: Pupils are equal, round, and reactive to light. Conjunctivae and EOM are normal.   Neck: Normal range of motion. Neck supple.   Cardiovascular: Normal rate.   No murmur heard.  Irregularly irregular rhythm   Pulmonary/Chest: Effort normal and breath sounds normal. No respiratory distress. He has no wheezes.   Abdominal: Soft. Bowel sounds are normal. He exhibits no distension. There is no tenderness.   Musculoskeletal: Normal range of motion. He exhibits edema. He exhibits no tenderness.   Neurological: He is alert and oriented to person, place, and time. No cranial nerve deficit.   Skin: Skin is warm and dry. He is not diaphoretic.   Psychiatric: He has a normal mood and affect. His behavior is normal.       Significant Labs: All pertinent labs within the past 24 hours have been reviewed.    Significant Imaging: I have reviewed and interpreted all pertinent imaging results/findings within the past 24 hours.      Assessment/Plan:      * Acute on chronic combined systolic and diastolic heart failure  --EF 23% per most recent TTE 4/2019  --BNP only mildly elevated at 243, but may be low in obese pts  --CXR and lower extremities with edema  --s/p lasix 80mg IV in ED,    --refused TTE x 2 on 10/24  --lasix 80mg IV TID with plans to transition to PO in AM  --strict I/O, daily weights  --fluid restrict 1.5L /day  --monitor renal function; stable    Acute hypoxemic respiratory failure  --unclear what sats were on RA, but   3L NC in ED  --says he is supposed to be on home oxygen, but his home concentrator does not work?  --no hx of COPD or asthma, says O2 is for his hx of  "CHF  --will plan on 6MWT prior to discharge ; ordered today  --on RA this AM        Microcytic anemia  Fe deficiency anemia  --Chronic problem - +FOBT during last admission and was supposed to have outpatient colonoscopy  --hgb 6.8 down from 7.3 on 8/4/19  --no site of active bleeding identified (no epistaxis, having light brown BMs, no hematuria)  --INR 4.9, down from 5.3 earlier on day of admission (per pt) without signs of active bleeding, so will monitor for now with daily INR and continue to hold warfarin. Will plan to reverse with Vit K in the setting of active bleeding.   --type and screen and repeat cbc ordered  --REFUSING blood transfusions for fear of nigel "AIDS". Tried to explain that the risk of dying from a brisk bleed in the setting of INR 4.9 is much higher than that of nigel HIV from a transfusion and even higher than that of actually dying from HIV. Still refused.  --anemia workup ordered--pt with low ferritin and Fe/ sat Fe suggestive of Fe def anemia  --Retic inappropriately low at 1.1  --continue Fe supplement- 1 dose IV 10/24/19 to assist with count recovery  --continue to monitor; if continues to downtrend will likely consult hematology for assistance  --stable today    Venous stasis ulcer  --no skin breakdown present, just chronic skin changes  --woundcare following; appreciate assistance      Stage 3 chronic kidney disease  --Cr 1.6; stable  --monitor with daily bmp  --avoid nephrotoxic agents and renally dose meds      Subtherapeutic international normalized ratio (INR)  --unclear why INR elevated. 4.9 on admission, now 5.3.  --reports med compliance; currently on warfarin 12mg po daily. says he's been on same dose since July.  --denies new meds, taking wrong dose or GI issues  --no signs of active bleeding, so no indication to reverse at this time  --however. hgb 6.8, down from 7.3 on 8/4/19  --downtrending (4.4); hgb stable  --will monitor overnight and likely DC tomorrow if " INR still downtrending      Syncope  --possibly due to orthostatic hypotension in the setting of severe anemia (hgb 6.8) and BB use vs hypoxemia vs cardiogenic (arrhythmia, valvular disease or ACS) vs TIA  --currently AAOx4 in ED with stable vital signs  --refusing blood transfusion, so will monitor for now  --TTE refused x 2  -- telemetry--pt refused  --f/u orthostatics-- pt  refused  --on 3L NC; will need 6 min walk test prior to DC home  --PT/OT consulted      Essential hypertension  Hypertensive Urgency- resolved  --SBP> 180 in ED without signs of end organ damage  --resume home isosorbide-hydralazine   --monitor      Atrial fibrillation, chronic  --supratherapeutic INR 5.9, so hold warfarin for now  --resume BB  --refused telemetry    Chronic anticoagulation  --on warfarin for afib  --hold warfarin for supratherapeutic INR        VTE Risk Mitigation (From admission, onward)         Ordered     Place sequential compression device  Until discontinued      10/23/19 1533     Reason for No Pharmacological VTE Prophylaxis  Once      10/23/19 1533     IP VTE HIGH RISK PATIENT  Once      10/23/19 1533     Place CAMI hose  Until discontinued      10/23/19 1439                Dispo: likely home with  tomorrow      Lisa Rodriguez MD  Department of Hospital Medicine   [facility]

## 2019-10-25 NOTE — PLAN OF CARE
[facility]    HOME HEALTH ORDERS  FACE TO FACE ENCOUNTER    Patient Name: Hamlet Terrell  YOB: 1965    PCP: Carlin Swift MD   PCP Address: [address] [facility] / [place] [address]  PCP Phone Number: [phone]  PCP Fax: [phone]    Encounter Date: 10/26/2019    Admit to Home Health    Diagnoses:  Active Hospital Problems    Diagnosis  POA    *Acute on chronic combined systolic and diastolic heart failure [I50.43]  Yes    Venous stasis dermatitis of both lower extremities [I87.2]  Yes    Acute on chronic congestive heart failure [I50.9]  Yes    Microcytic anemia [D50.9]  Yes    Acute hypoxemic respiratory failure [J96.01]  Yes    Venous stasis ulcer [I83.009, L97.909]  Yes    Stage 3 chronic kidney disease [N18.3]  Yes    Subtherapeutic international normalized ratio (INR) [R79.1]  Yes    Syncope [R55]  Yes    Essential hypertension [I10]  Yes     Chronic    Atrial fibrillation, chronic [I48.20]  Yes     Chronic      Resolved Hospital Problems   No resolved problems to display.       No future appointments.        I have seen and examined this patient face to face today. My clinical findings that support the need for the home health skilled services and home bound status are the following:  Weakness/numbness causing balance and gait disturbance due to Heart Failure, Weakness/Debility and Anemia making it taxing to leave home.  Medical restrictions requiring assistance of another human to leave home due to  Dyspnea on exertion (SOB) and Home oxygen requirement.    Allergies:  Review of patient's allergies indicates:   Allergen Reactions    Acetaminophen      Itching    Oxycodone-acetaminophen      Other reaction(s): Itching    Ace inhibitors Other (See Comments)     cough       Diet: cardiac diet    Activities: activity as tolerated    Nursing:   SN to complete comprehensive assessment including routine vital signs. Instruct on disease process and  s/s of complications to report to MD. Review/verify medication list sent home with the patient at time of discharge  and instruct patient/caregiver as needed. Frequency may be adjusted depending on start of care date.    Notify MD if SBP > 160 or < 90; DBP > 90 or < 50; HR > 120 or < 50; Temp > 101; Other:         CONSULTS:    Physical Therapy to evaluate and treat. Evaluate for home safety and equipment needs; Establish/upgrade home exercise program. Perform / instruct on therapeutic exercises, gait training, transfer training, and Range of Motion.  Occupational Therapy to evaluate and treat. Evaluate home environment for safety and equipment needs. Perform/Instruct on transfers, ADL training, ROM, and therapeutic exercises.   to evaluate for community resources/long-range planning.  Aide to provide assistance with personal care, ADLs, and vital signs.    MISCELLANEOUS CARE:  Lab draw:  Check weekly INR starting 10/28/19  Results to be sent to PCP, Dr. Joel Swift tel # [phone]    WOUND CARE ORDERS  n/a      Medications: Review discharge medications with patient and family and provide education.      Current Discharge Medication List      CONTINUE these medications which have CHANGED    Details   warfarin (COUMADIN) 5 MG tablet Take 1 tablet (5 mg total) by mouth Daily. Take as directed by coumadin clinic  Qty: 30 tablet, Refills: 0    Associated Diagnoses: Atrial fibrillation, chronic; Chronic anticoagulation         CONTINUE these medications which have NOT CHANGED    Details   albuterol (PROVENTIL/VENTOLIN HFA) 90 mcg/actuation inhaler Inhale 1-2 puffs into the lungs every 6 (six) hours as needed for Wheezing. Rescue  Qty: 1 Inhaler, Refills: 0      aspirin (ECOTRIN) 81 MG EC tablet Take 81 mg by mouth once daily.      calcium carbonate (TUMS) 200 mg calcium (500 mg) chewable tablet Take 1 tablet (500 mg total) by mouth 3 (three) times daily as needed.      ferrous sulfate 325 (65 FE) MG EC  tablet Take 1 tablet (325 mg total) by mouth once daily.  Refills: 0      furosemide (LASIX) 80 MG tablet Take 1 tablet (80 mg total) by mouth 2 (two) times daily.  Qty: 60 tablet, Refills: 11      gabapentin (NEURONTIN) 250 mg/5 mL (5 mL) solution Take 2 mLs (100 mg total) by mouth 2 (two) times daily.  Qty: 150 mL, Refills: 0      isosorbide-hydrALAZINE 20-37.5 mg (BIDIL) 20-37.5 mg Tab Take 1 tablet by mouth 2 (two) times daily.  Qty: 60 tablet, Refills: 11      methyl salicylate-menthol 15-10% 15-10 % Crea Apply topically 3 (three) times daily.  Refills: 0      metoprolol tartrate (LOPRESSOR) 25 MG tablet Take 1 tablet (25 mg total) by mouth every 6 (six) hours.  Qty: 120 tablet, Refills: 11      nitroGLYCERIN (NITROSTAT) 0.4 MG SL tablet Place 1 tablet (0.4 mg total) under the tongue every 5 (five) minutes as needed for Chest pain. Tablet, Sublingual Sublingual  Qty: 30 tablet, Refills: 11      pantoprazole (PROTONIX) 40 MG tablet Take 1 tablet (40 mg total) by mouth once daily.  Qty: 30 tablet, Refills: 11      polyethylene glycol (GLYCOLAX) 17 gram PwPk Take 17 g by mouth 2 (two) times daily.  Qty: 30 each, Refills: 0      ramelteon (ROZEREM) 8 mg tablet Take 1 tablet (8 mg total) by mouth nightly as needed for Insomnia.  Qty: 30 tablet, Refills: 0      senna-docusate 8.6-50 mg (PERICOLACE) 8.6-50 mg per tablet Take 1 tablet by mouth 2 (two) times daily.             I certify that this patient is confined to his home and needs intermittent skilled nursing care, physical therapy and occupational therapy.

## 2019-10-25 NOTE — ASSESSMENT & PLAN NOTE
--unclear what sats were on RA, but   3L NC in ED  --says he is supposed to be on home oxygen, but his home concentrator does not work?  --no hx of COPD or asthma, says O2 is for his hx of CHF  --will plan on 6MWT prior to discharge ; ordered today  --on RA this AM

## 2019-10-25 NOTE — ASSESSMENT & PLAN NOTE
--EF 23% per most recent TTE 4/2019  --BNP only mildly elevated at 243, but may be low in obese pts  --CXR and lower extremities with edema  --s/p lasix 80mg IV in ED,    --refused TTE x 2 on 10/24  --lasix 80mg IV TID with plans to transition to PO in AM  --strict I/O, daily weights  --fluid restrict 1.5L /day  --monitor renal function; stable

## 2019-10-25 NOTE — NURSING
Late note:    Report from night nurse, patient refused to let her replace telemetry electrodes; refused vital signs; refused oral gabapentin.    Patient also refused 0800 vital signs, oral gabapentin, and replacement of telemetry electrodes.  Patient refused 6 minute walk test, and had 100% spO2 on room air.

## 2019-10-25 NOTE — ASSESSMENT & PLAN NOTE
--possibly due to orthostatic hypotension in the setting of severe anemia (hgb 6.8) and BB use vs hypoxemia vs cardiogenic (arrhythmia, valvular disease or ACS) vs TIA  --currently AAOx4 in ED with stable vital signs  --refusing blood transfusion, so will monitor for now  --TTE refused x 2  -- telemetry--pt refused  --f/u orthostatics-- pt  refused  --on 3L NC; will need 6 min walk test prior to DC home  --PT/OT consulted

## 2019-10-25 NOTE — PROGRESS NOTES
[facility]ax[facility] (hosp ) called to report that the Patient will be discharged from [facility] today, will be resuming [facility]

## 2019-10-26 VITALS
BODY MASS INDEX: 35.55 KG/M2 | SYSTOLIC BLOOD PRESSURE: 131 MMHG | HEART RATE: 83 BPM | OXYGEN SATURATION: 99 % | RESPIRATION RATE: 16 BRPM | DIASTOLIC BLOOD PRESSURE: 68 MMHG | HEIGHT: 69 IN | TEMPERATURE: 99 F | WEIGHT: 240 LBS

## 2019-10-26 LAB
ANION GAP SERPL CALC-SCNC: 11 MMOL/L (ref 8–16)
BASOPHILS # BLD AUTO: 0.03 K/UL (ref 0–0.2)
BASOPHILS NFR BLD: 0.5 % (ref 0–1.9)
BUN SERPL-MCNC: 32 MG/DL (ref 6–20)
CALCIUM SERPL-MCNC: 9.5 MG/DL (ref 8.7–10.5)
CHLORIDE SERPL-SCNC: 105 MMOL/L (ref 95–110)
CO2 SERPL-SCNC: 27 MMOL/L (ref 23–29)
CREAT SERPL-MCNC: 1.7 MG/DL (ref 0.5–1.4)
DIFFERENTIAL METHOD: ABNORMAL
EOSINOPHIL # BLD AUTO: 0.3 K/UL (ref 0–0.5)
EOSINOPHIL NFR BLD: 5.4 % (ref 0–8)
ERYTHROCYTE [DISTWIDTH] IN BLOOD BY AUTOMATED COUNT: 19.2 % (ref 11.5–14.5)
EST. GFR  (AFRICAN AMERICAN): 52.4 ML/MIN/1.73 M^2
EST. GFR  (NON AFRICAN AMERICAN): 45.4 ML/MIN/1.73 M^2
GLUCOSE SERPL-MCNC: 115 MG/DL (ref 70–110)
HCT VFR BLD AUTO: 22.5 % (ref 40–54)
HGB BLD-MCNC: 6.3 G/DL (ref 14–18)
IMM GRANULOCYTES # BLD AUTO: 0.02 K/UL (ref 0–0.04)
IMM GRANULOCYTES NFR BLD AUTO: 0.3 % (ref 0–0.5)
INR PPP: 2.9 (ref 0.8–1.2)
LYMPHOCYTES # BLD AUTO: 1.2 K/UL (ref 1–4.8)
LYMPHOCYTES NFR BLD: 19.4 % (ref 18–48)
MAGNESIUM SERPL-MCNC: 2 MG/DL (ref 1.6–2.6)
MCH RBC QN AUTO: 21.6 PG (ref 27–31)
MCHC RBC AUTO-ENTMCNC: 28 G/DL (ref 32–36)
MCV RBC AUTO: 77 FL (ref 82–98)
MONOCYTES # BLD AUTO: 1.2 K/UL (ref 0.3–1)
MONOCYTES NFR BLD: 19.8 % (ref 4–15)
NEUTROPHILS # BLD AUTO: 3.4 K/UL (ref 1.8–7.7)
NEUTROPHILS NFR BLD: 54.6 % (ref 38–73)
NRBC BLD-RTO: 0 /100 WBC
PLATELET # BLD AUTO: 375 K/UL (ref 150–350)
PMV BLD AUTO: 9.7 FL (ref 9.2–12.9)
POTASSIUM SERPL-SCNC: 3.8 MMOL/L (ref 3.5–5.1)
PROTHROMBIN TIME: 28.1 SEC (ref 9–12.5)
RBC # BLD AUTO: 2.91 M/UL (ref 4.6–6.2)
SODIUM SERPL-SCNC: 143 MMOL/L (ref 136–145)
WBC # BLD AUTO: 6.25 K/UL (ref 3.9–12.7)

## 2019-10-26 PROCEDURE — 99239 PR HOSPITAL DISCHARGE DAY,>30 MIN: ICD-10-PCS | Mod: ,,, | Performed by: INTERNAL MEDICINE

## 2019-10-26 PROCEDURE — 80048 BASIC METABOLIC PNL TOTAL CA: CPT

## 2019-10-26 PROCEDURE — 94761 N-INVAS EAR/PLS OXIMETRY MLT: CPT

## 2019-10-26 PROCEDURE — G0378 HOSPITAL OBSERVATION PER HR: HCPCS

## 2019-10-26 PROCEDURE — 85025 COMPLETE CBC W/AUTO DIFF WBC: CPT

## 2019-10-26 PROCEDURE — 99239 HOSP IP/OBS DSCHRG MGMT >30: CPT | Mod: ,,, | Performed by: INTERNAL MEDICINE

## 2019-10-26 PROCEDURE — 36415 COLL VENOUS BLD VENIPUNCTURE: CPT

## 2019-10-26 PROCEDURE — 83735 ASSAY OF MAGNESIUM: CPT

## 2019-10-26 PROCEDURE — 25000003 PHARM REV CODE 250: Performed by: INTERNAL MEDICINE

## 2019-10-26 PROCEDURE — 85610 PROTHROMBIN TIME: CPT

## 2019-10-26 RX ORDER — DIPHENHYDRAMINE HYDROCHLORIDE 50 MG/ML
INJECTION INTRAMUSCULAR; INTRAVENOUS
Status: DISCONTINUED
Start: 2019-10-26 | End: 2019-10-26 | Stop reason: WASHOUT

## 2019-10-26 RX ORDER — WARFARIN SODIUM 5 MG/1
5 TABLET ORAL DAILY
Qty: 30 TABLET | Refills: 0 | Status: ON HOLD | OUTPATIENT
Start: 2019-10-26 | End: 2019-12-06

## 2019-10-26 RX ADMIN — HYDRALAZINE HYDROCHLORIDE AND ISOSORBIDE DINITRATE 1 TABLET: 37.5; 2 TABLET, FILM COATED ORAL at 03:10

## 2019-10-26 NOTE — PLAN OF CARE
Went over plan of care- answered all questions . Pt denied dsg changes to leg, 4 am vitals ( called and advised on call corwin of this). Pt sleeps in chair all night. Wont get back in bed for weight.  Pt states he wants to be getting out of here - he has plans outside for his birthday. Will continue to monitor

## 2019-10-26 NOTE — DISCHARGE INSTRUCTIONS
Your coumadin levels (INR) is back at goal. It is 2.9 today.   --start taking warfarin 5mg daily until your INR is rechecked by coumadin clinic

## 2019-10-26 NOTE — NURSING
Pt DCd. IVs and tele removed. Pt has a ride home. Transfering via wheelchair. VSS. Verbalize understanding of DC instructions.

## 2019-10-26 NOTE — PLAN OF CARE
SW sent updated home health orders to [facility] via [employer]. SW left message for [employer] to inform of updates and pt discharge.     SW spoke with patient with compliance with healthcare. Pt voiced understanding.     Vaishali Osorio, SHAYNE  [facility]

## 2019-10-26 NOTE — PROGRESS NOTES
Notified Dr. Cramer pts /55. Orders received to hold 80mg IV Lasix. Lopressor 25mg and Hydralazine 37.5mg. Pt asymptomatic. Will continue to monitor pt.

## 2019-10-28 ENCOUNTER — ANTI-COAG VISIT (OUTPATIENT)
Dept: CARDIOLOGY | Facility: CLINIC | Age: 54
End: 2019-10-28
Payer: MEDICAID

## 2019-10-28 DIAGNOSIS — Z79.01 CHRONIC ANTICOAGULATION: ICD-10-CM

## 2019-10-28 DIAGNOSIS — I48.20 ATRIAL FIBRILLATION, CHRONIC: ICD-10-CM

## 2019-10-28 PROCEDURE — 93793 PR ANTICOAGULANT MGMT FOR PT TAKING WARFARIN: ICD-10-PCS | Mod: ,,,

## 2019-10-28 PROCEDURE — 93793 ANTICOAG MGMT PT WARFARIN: CPT | Mod: ,,,

## 2019-10-28 NOTE — PROGRESS NOTES
Patient admitted 10/23/19 - 10/26/19 for CHF.  Patient had elevated INR in ER 4.9 and c/o leg edema.  No doses of Coumadin given during admit.  Per discharge AVS new prescription for Coumadin 5 mg tab take 1 daily, resumed [facility] home health with possibly getting INR on 10/28/19 from home health order information in chart.  Per chart review patient previously has on hand Coumadin 4 mg and 8 mg strength tablets.  There is no discharge summary at this time but from the 10/25/19 progress note patient diuresed 1.8 L fluid, refused blood products, refused TTE twice, refused telemetry monitoring, and refused vital signs so documentation may be forth coming later.  Per progress notes there was no signs of active bleeding, no BMs, or no FOBT collected. Chart routed to pharmacist to review.

## 2019-10-28 NOTE — PHYSICIAN QUERY
"PT Name: Hamlet Terrell  MR #: [id number]    Physician Query Form - Respiratory Condition Clarification      CDS/: Ruth Berg RN              Contact information: [phone]    This form is a permanent document in the medical record.    Query Date: October 28, 2019    By submitting this query, we are merely seeking further clarification of documentation. Please utilize your independent clinical judgment when addressing the question(s) below.    The Medical record contains the following   Indicators   Supporting Clinical Findings Location in Medical Record   x   SOB, MUÑOZ, Wheezing, Productive Cough, Use of Accessory Muscles, etc. patient reports his legs started to swell up last night, reports his chronic shortness of breath is unchanged, however today while he was standing "at the gate" he suddenly felt lightheaded and had a near syncopal event, with vision briefly blacking out requiring him to sit down.    ED Provider Notes      10/23   x   Acute/Chronic Illness Acute on chronic combined systolic and diastolic heart failure   Acute hypoxemic respiratory failure   Microcytic anemia  Stage 3 chronic kidney disease   Subtherapeutic international normalized ratio (INR)   Syncope  Essential hypertension   Atrial fibrillation, chronic   Chronic anticoagulation                       H&P 10/23   x   Radiology Findings Moderate-severe CHF improved from the prior study. Chest Xray 10/23   x   Respiratory Distress or Failure Acute hypoxemic respiratory failure   --unclear what sats are on RA, nut currenlty at goal on 3L NC   --says he is supposed to be on home oxygen, but his home concentrator does not work?   --no hx of COPD or asthma, says O2 is for his hx of CHF   --will plan on 6MWT   --wean off as tolerated    H&P 10/23   x   Hypoxia or Hypercapnia Acute hypoxemic respiratory failure  H&P 10/23   x   RR         ABGs         O2 sat Vital Signs (24h Range):     Resp: [17-18] 18   SpO2: [97 %-98 %] 98 %      H&P " 10/23      BiPAP/Intubation     x   Supplemental O2 --unclear what sats are on RA, nut currenlty at goal on 3L NC  H&P 10/23   x   Home O2, Oxygen Dependence --says he is supposed to be on home oxygen, but his home concentrator does not work?   --no hx of COPD or asthma, says O2 is for his hx of CHF     Medical restrictions requiring assistance of another human to leave home due to Dyspnea on exertion (SOB) and Home oxygen requirement.    H&P 10/23          Care Plan 10/25       Hospital Medicine        Treatment        Other       Respiratory failure can be acute, chronic or both. It is generally further specified as hypoxic, hypercapnic or both. Lastly, it is important to identify an etiology, if known or suspected.   References::  https://www.acphospitalist.org/archives/2013/10/coding.htm http://Ditto/acute-respiratory-failure-know     The clinical guidelines noted below are only system guidelines, and do not replace the providers clinical judgment.    Provider, please specify diagnosis or diagnoses associated with above clinical findings.     Doctor, please clarify the acuity of the patients respiratory failure.    [   ] Acute Respiratory Failure with Hypoxia - ABG pO2 < 60 mmHg or O2 sat of 88% on RA and respiratory symptoms documented     [x   ] Acute and (on) Chronic Respiratory Failure with Hypoxia - pO2 >10 mmHg below baseline OR SpO2 < 91% on usual home O2 OR O2 > 2L/min over baseline home O2    [   ] Chronic Respiratory Failure with Hypoxia -Continuous home oxygen use     [   ] Other Chronic Respiratory Failure     [   ] Other Respiratory Diagnosis (please specify): _________________________________     [   ]  Clinically Undetermined       Please document in your progress notes daily for the duration of treatment until resolved and include in your discharge summary.

## 2019-10-29 NOTE — DISCHARGE SUMMARY
[facility] Hospital Medicine  Discharge Summary      Patient Name: Hamlet Terrell  MRN: [id number]  Admission Date: 10/23/2019  Hospital Length of Stay: 3 days  Discharge Date and Time: 10/26/2019  7:06 PM  Attending Physician: No att. providers found   Discharging Provider: Lisa Rodriguez MD  Primary Care Provider: Carlin Swift MD  [facility] Medicine Team: [facility] [place] Lisa Rodriguez MD    HPI:   53M combined CHF (last Echo 4/2019 with EF 23%, diastolic dysfuction, moderated MR and TR), HTN, chronic A. fib (on coumadin), A. flutter s/p ablation, CVA (in 2009), CAD, presented to ED for a syncopal episode earlier today.    Patient reports he got up to open a gate at his house when he suddenly lost consciousness for what he believes was 2-3 seconds. Denies head or body trauma. Did not experience lightheadedness, diaphoresis, chest pain, bowel/bladder incontinence or palpitations. Reports he has chronic dyspnea that limits his ability to ambulate, but states he is independent in his ADLs. He is not on home oxygen because his concentrator does not work. Reports medication compliance. Was taking warfarin 12mg po daily (last dose 7pm yesterday) and was told by his  nurse that his INR was 5.3 today. Denies taking new meds including abx or supplements, no melenic or bloody stools (last BM was light brown last night), no epistaxis or hematuria. Notes spitting up a blood clot 2 days ago. Has also been taking lasix 80mg PO BID. Denies worsening lower ext edema (although he reported that to ED provider), but states he has gained approx 25lbs over the past mth. No reported fevers, chills, dysuria, abdominal pain, cough, malaise or diarrhea.     In the ED pt with stable vital signs. On 3L NC with sats 97%. Labs significant for Hgb 6.8, INR 5.9, , Trop 0.057. CXR with edema. Given lasix 80mg IV x 1.         * No surgery found *      Hospital Course:    Admitted to  on 10/24.  Remained HD stable overnight. Diuresed 1.8 liters. Still complaining of weakness and SOb on day 2. Hgb downtrended to 6.8>>6.3 without signs of active bleed. INR increased 4.9>>5.4. Pt continues to refuse blood products. Anemia workup consistent with Fe def anemia. Given 1 dose IV ferlicit to assist with count recovery. Refused TTE twice, which had been ordered as part of syncope workup. On day 3 INR finally downtrended (4.4), Hgb stable (6.3) without signs of active bleeding. No BMs, so FOBT not collected. Pt breathing comfortably on RA during encounter. 6 Min Walk Test ordered, but sats at goal at rest and with exertion. Refusing telemetry and vital signs. Continuing to diurese. Renal function stable. On day 4 INR 2.5. Discussed with pharmacy and will start warfarin at 5mg. H/H stable at 6.3. FOBT again positive. Had a long conversation with pt regarding his long term goals given that he continues to refuse tx, He states that he would like to get better and is now interested in undergoing screening colonoscopy outpatient. He says he refused ICD placement in the past and he still does not want it. However, is open to seeing cardiology outpt. Pt medically stable for discharge today with . INR lab to be drawn 10/28 to reassess INR level.      Consults:     No new Assessment & Plan notes have been filed under this hospital service since the last note was generated.  Service: Hospital Medicine    Final Active Diagnoses:    Diagnosis Date Noted POA    PRINCIPAL PROBLEM:  Acute on chronic combined systolic and diastolic heart failure [I50.43] 11/18/2013 Yes    Venous stasis dermatitis of both lower extremities [I87.2] 10/24/2019 Yes    Acute on chronic congestive heart failure [I50.9] 10/23/2019 Yes    Microcytic anemia [D50.9] 10/23/2019 Yes    Acute hypoxemic respiratory failure [J96.01] 10/23/2019 Yes    Venous stasis ulcer [I83.009, L97.909] 05/15/2019 Yes    Stage 3 chronic kidney disease [N18.3] 05/31/2016  Yes    Subtherapeutic international normalized ratio (INR) [R79.1] 12/27/2015 Yes    Syncope [R55] 02/13/2014 Yes    Essential hypertension [I10] 01/02/2013 Yes     Chronic    Atrial fibrillation, chronic [I48.20] 01/02/2013 Yes     Chronic      Problems Resolved During this Admission:       Discharged Condition: stable    Disposition: Home or Self Care    Follow Up:  Follow-up Information     Schedule an appointment as soon as possible for a visit with Carlin Swift MD.    Specialty:  Family Medicine  Why:  hospital follow-up  Contact information:  [address] [facility] [address]  [phone]             [facility] - Cardiology .    Specialties:  Cardiology, Cardiovascular Disease, Cardiac Rehabilitation, Cardiothoracic Surgery  Contact information:  [address] [facility] [address]  [phone]             [facility] - Gastroenterology .    Specialty:  Gastroenterology  Contact information:  [address] [facility] [address]  [phone]                 Patient Instructions:      Ambulatory Referral to Cardiology   Referral Priority: Routine Referral Type: Consultation   Referral Reason: Specialty Services Required   Referred to Provider: [facility] - CARDIOLOGY Requested Specialty: Cardiology   Number of Visits Requested: 1     Ambulatory Referral to Gastroenterology   Referral Priority: Routine Referral Type: Consultation   Referral Reason: Specialty Services Required   Referred to Provider: [facility] - GASTROENTEROLOGY Requested Specialty: Gastroenterology   Number of Visits Requested: 1     Diet Cardiac     Notify your health care provider if you experience any of the following:  difficulty breathing or increased cough     Notify your health care provider if you experience any of the following:  severe persistent headache     Notify your health care provider if you experience any of the following:  persistent  dizziness, light-headedness, or visual disturbances     Notify your health care provider if you experience any of the following:  increased confusion or weakness     Activity as tolerated       Significant Diagnostic Studies: Labs: All labs within the past 24 hours have been reviewed    Pending Diagnostic Studies:     Procedure Component Value Units Date/Time    Echo Color Flow Doppler? Yes [[id number]] Resulted:  10/24/19 1409    Order Status:  Sent Lab Status:  No result Updated:  10/25/19 0923    Echo Color Flow Doppler? Yes [[id number]]     Order Status:  Sent Lab Status:  No result          Medications:  Reconciled Home Medications:      Medication List      START taking these medications    warfarin 5 MG tablet  Commonly known as:  COUMADIN  Take 1 tablet (5 mg total) by mouth Daily. Take as directed by coumadin clinic        CONTINUE taking these medications    albuterol 90 mcg/actuation inhaler  Commonly known as:  PROVENTIL/VENTOLIN HFA  Inhale 1-2 puffs into the lungs every 6 (six) hours as needed for Wheezing. Rescue     aspirin 81 MG EC tablet  Commonly known as:  ECOTRIN  Take 81 mg by mouth once daily.     calcium carbonate 200 mg calcium (500 mg) chewable tablet  Commonly known as:  TUMS  Take 1 tablet (500 mg total) by mouth 3 (three) times daily as needed.     ferrous sulfate 325 (65 FE) MG EC tablet  Take 1 tablet (325 mg total) by mouth once daily.     furosemide 80 MG tablet  Commonly known as:  LASIX  Take 1 tablet (80 mg total) by mouth 2 (two) times daily.     gabapentin 250 mg/5 mL (5 mL) solution  Commonly known as:  NEURONTIN  Take 2 mLs (100 mg total) by mouth 2 (two) times daily.     isosorbide-hydrALAZINE 20-37.5 mg 20-37.5 mg Tab  Commonly known as:  BIDIL  Take 1 tablet by mouth 2 (two) times daily.     methyl salicylate-menthol 15-10% 15-10 % Crea  Apply topically 3 (three) times daily.     metoprolol tartrate 25 MG tablet  Commonly known as:  LOPRESSOR  Take 1 tablet (25 mg total) by  mouth every 6 (six) hours.     nitroGLYCERIN 0.4 MG SL tablet  Commonly known as:  NITROSTAT  Place 1 tablet (0.4 mg total) under the tongue every 5 (five) minutes as needed for Chest pain. Tablet, Sublingual Sublingual     pantoprazole 40 MG tablet  Commonly known as:  PROTONIX  Take 1 tablet (40 mg total) by mouth once daily.     polyethylene glycol 17 gram Pwpk  Commonly known as:  GLYCOLAX  Take 17 g by mouth 2 (two) times daily.     ramelteon 8 mg tablet  Commonly known as:  ROZEREM  Take 1 tablet (8 mg total) by mouth nightly as needed for Insomnia.     senna-docusate 8.6-50 mg 8.6-50 mg per tablet  Commonly known as:  PERICOLACE  Take 1 tablet by mouth 2 (two) times daily.            Indwelling Lines/Drains at time of discharge:   Lines/Drains/Airways     None                 Time spent on the discharge of patient: 35 minutes  Patient was seen and examined on the date of discharge and determined to be suitable for discharge.         Lisa Rodriguez MD  Department of Hospital Medicine  [facility]

## 2019-11-24 ENCOUNTER — HOSPITAL ENCOUNTER (INPATIENT)
Facility: HOSPITAL | Age: 54
LOS: 14 days | Discharge: HOME HEALTH | DRG: 291 | End: 2019-12-08
Attending: EMERGENCY MEDICINE | Admitting: EMERGENCY MEDICINE
Payer: MEDICAID

## 2019-11-24 DIAGNOSIS — I48.20 ATRIAL FIBRILLATION, CHRONIC: Chronic | ICD-10-CM

## 2019-11-24 DIAGNOSIS — I50.43 ACUTE ON CHRONIC COMBINED SYSTOLIC (CONGESTIVE) AND DIASTOLIC (CONGESTIVE) HEART FAILURE: ICD-10-CM

## 2019-11-24 DIAGNOSIS — R07.9 CHEST PAIN: ICD-10-CM

## 2019-11-24 DIAGNOSIS — R06.02 SHORTNESS OF BREATH: ICD-10-CM

## 2019-11-24 DIAGNOSIS — N17.9 AKI (ACUTE KIDNEY INJURY): ICD-10-CM

## 2019-11-24 DIAGNOSIS — D64.9 SYMPTOMATIC ANEMIA: Primary | ICD-10-CM

## 2019-11-24 DIAGNOSIS — I50.43 ACUTE ON CHRONIC COMBINED SYSTOLIC AND DIASTOLIC HEART FAILURE: ICD-10-CM

## 2019-11-24 LAB
ABO + RH BLD: NORMAL
ALBUMIN SERPL BCP-MCNC: 3.5 G/DL (ref 3.5–5.2)
ALLENS TEST: ABNORMAL
ALP SERPL-CCNC: 189 U/L (ref 55–135)
ALT SERPL W/O P-5'-P-CCNC: 11 U/L (ref 10–44)
ANION GAP SERPL CALC-SCNC: 13 MMOL/L (ref 8–16)
AST SERPL-CCNC: 21 U/L (ref 10–40)
BASOPHILS # BLD AUTO: 0.04 K/UL (ref 0–0.2)
BASOPHILS NFR BLD: 0.6 % (ref 0–1.9)
BILIRUB SERPL-MCNC: 2 MG/DL (ref 0.1–1)
BLD GP AB SCN CELLS X3 SERPL QL: NORMAL
BNP SERPL-MCNC: 210 PG/ML (ref 0–99)
BUN SERPL-MCNC: 53 MG/DL (ref 6–20)
CALCIUM SERPL-MCNC: 9.2 MG/DL (ref 8.7–10.5)
CHLORIDE SERPL-SCNC: 103 MMOL/L (ref 95–110)
CO2 SERPL-SCNC: 24 MMOL/L (ref 23–29)
CREAT SERPL-MCNC: 1.9 MG/DL (ref 0.5–1.4)
DIFFERENTIAL METHOD: ABNORMAL
EOSINOPHIL # BLD AUTO: 0.3 K/UL (ref 0–0.5)
EOSINOPHIL NFR BLD: 4.4 % (ref 0–8)
ERYTHROCYTE [DISTWIDTH] IN BLOOD BY AUTOMATED COUNT: 20.4 % (ref 11.5–14.5)
EST. GFR  (AFRICAN AMERICAN): 45.5 ML/MIN/1.73 M^2
EST. GFR  (NON AFRICAN AMERICAN): 39.4 ML/MIN/1.73 M^2
ESTIMATED AVG GLUCOSE: 120 MG/DL (ref 68–131)
GLUCOSE SERPL-MCNC: 86 MG/DL (ref 70–110)
HBA1C MFR BLD HPLC: 5.8 % (ref 4–5.6)
HCO3 UR-SCNC: 21.1 MMOL/L (ref 24–28)
HCT VFR BLD AUTO: 22.5 % (ref 40–54)
HGB BLD-MCNC: 6.2 G/DL (ref 14–18)
IMM GRANULOCYTES # BLD AUTO: 0.02 K/UL (ref 0–0.04)
IMM GRANULOCYTES NFR BLD AUTO: 0.3 % (ref 0–0.5)
INR PPP: 2 (ref 0.8–1.2)
LYMPHOCYTES # BLD AUTO: 0.7 K/UL (ref 1–4.8)
LYMPHOCYTES NFR BLD: 11.3 % (ref 18–48)
MAGNESIUM SERPL-MCNC: 2.4 MG/DL (ref 1.6–2.6)
MCH RBC QN AUTO: 20.8 PG (ref 27–31)
MCHC RBC AUTO-ENTMCNC: 27.6 G/DL (ref 32–36)
MCV RBC AUTO: 76 FL (ref 82–98)
MONOCYTES # BLD AUTO: 1.4 K/UL (ref 0.3–1)
MONOCYTES NFR BLD: 21.6 % (ref 4–15)
NEUTROPHILS # BLD AUTO: 4 K/UL (ref 1.8–7.7)
NEUTROPHILS NFR BLD: 61.8 % (ref 38–73)
NRBC BLD-RTO: 0 /100 WBC
PCO2 BLDA: 37.8 MMHG (ref 35–45)
PH SMN: 7.35 [PH] (ref 7.35–7.45)
PLATELET # BLD AUTO: 270 K/UL (ref 150–350)
PMV BLD AUTO: 10.7 FL (ref 9.2–12.9)
PO2 BLDA: 17 MMHG (ref 40–60)
POC BE: -4 MMOL/L
POC SATURATED O2: 23 % (ref 95–100)
POC TCO2: 22 MMOL/L (ref 24–29)
POTASSIUM SERPL-SCNC: 4.1 MMOL/L (ref 3.5–5.1)
PROT SERPL-MCNC: 8.3 G/DL (ref 6–8.4)
PROTHROMBIN TIME: 19.4 SEC (ref 9–12.5)
RBC # BLD AUTO: 2.98 M/UL (ref 4.6–6.2)
SAMPLE: ABNORMAL
SITE: ABNORMAL
SODIUM SERPL-SCNC: 140 MMOL/L (ref 136–145)
TROPONIN I SERPL DL<=0.01 NG/ML-MCNC: 0.09 NG/ML (ref 0–0.03)
WBC # BLD AUTO: 6.4 K/UL (ref 3.9–12.7)

## 2019-11-24 PROCEDURE — 83036 HEMOGLOBIN GLYCOSYLATED A1C: CPT

## 2019-11-24 PROCEDURE — 83735 ASSAY OF MAGNESIUM: CPT

## 2019-11-24 PROCEDURE — 93010 EKG 12-LEAD: ICD-10-PCS | Mod: ,,, | Performed by: INTERNAL MEDICINE

## 2019-11-24 PROCEDURE — 93005 ELECTROCARDIOGRAM TRACING: CPT

## 2019-11-24 PROCEDURE — 99285 PR EMERGENCY DEPT VISIT,LEVEL V: ICD-10-PCS | Mod: ,,, | Performed by: EMERGENCY MEDICINE

## 2019-11-24 PROCEDURE — 86901 BLOOD TYPING SEROLOGIC RH(D): CPT

## 2019-11-24 PROCEDURE — 85025 COMPLETE CBC W/AUTO DIFF WBC: CPT

## 2019-11-24 PROCEDURE — 99285 EMERGENCY DEPT VISIT HI MDM: CPT | Mod: ,,, | Performed by: EMERGENCY MEDICINE

## 2019-11-24 PROCEDURE — 80053 COMPREHEN METABOLIC PANEL: CPT

## 2019-11-24 PROCEDURE — 96374 THER/PROPH/DIAG INJ IV PUSH: CPT

## 2019-11-24 PROCEDURE — 25000003 PHARM REV CODE 250: Performed by: HOSPITALIST

## 2019-11-24 PROCEDURE — 85610 PROTHROMBIN TIME: CPT

## 2019-11-24 PROCEDURE — 99900035 HC TECH TIME PER 15 MIN (STAT)

## 2019-11-24 PROCEDURE — 83880 ASSAY OF NATRIURETIC PEPTIDE: CPT

## 2019-11-24 PROCEDURE — 63600175 PHARM REV CODE 636 W HCPCS: Performed by: EMERGENCY MEDICINE

## 2019-11-24 PROCEDURE — 99285 EMERGENCY DEPT VISIT HI MDM: CPT | Mod: 25

## 2019-11-24 PROCEDURE — 93010 ELECTROCARDIOGRAM REPORT: CPT | Mod: ,,, | Performed by: INTERNAL MEDICINE

## 2019-11-24 PROCEDURE — 94761 N-INVAS EAR/PLS OXIMETRY MLT: CPT

## 2019-11-24 PROCEDURE — 11000001 HC ACUTE MED/SURG PRIVATE ROOM

## 2019-11-24 PROCEDURE — 82803 BLOOD GASES ANY COMBINATION: CPT

## 2019-11-24 PROCEDURE — 99223 PR INITIAL HOSPITAL CARE,LEVL III: ICD-10-PCS | Mod: ,,, | Performed by: HOSPITALIST

## 2019-11-24 PROCEDURE — 84484 ASSAY OF TROPONIN QUANT: CPT

## 2019-11-24 PROCEDURE — 63600175 PHARM REV CODE 636 W HCPCS: Performed by: STUDENT IN AN ORGANIZED HEALTH CARE EDUCATION/TRAINING PROGRAM

## 2019-11-24 PROCEDURE — 99223 1ST HOSP IP/OBS HIGH 75: CPT | Mod: ,,, | Performed by: HOSPITALIST

## 2019-11-24 PROCEDURE — 86920 COMPATIBILITY TEST SPIN: CPT

## 2019-11-24 RX ORDER — TALC
6 POWDER (GRAM) TOPICAL NIGHTLY PRN
Status: DISCONTINUED | OUTPATIENT
Start: 2019-11-24 | End: 2019-12-08 | Stop reason: HOSPADM

## 2019-11-24 RX ORDER — CALCIUM CARBONATE 200(500)MG
500 TABLET,CHEWABLE ORAL 3 TIMES DAILY PRN
Status: DISCONTINUED | OUTPATIENT
Start: 2019-11-24 | End: 2019-12-08 | Stop reason: HOSPADM

## 2019-11-24 RX ORDER — FUROSEMIDE 10 MG/ML
120 INJECTION INTRAMUSCULAR; INTRAVENOUS 2 TIMES DAILY
Status: DISCONTINUED | OUTPATIENT
Start: 2019-11-25 | End: 2019-11-26

## 2019-11-24 RX ORDER — FUROSEMIDE 10 MG/ML
120 INJECTION INTRAMUSCULAR; INTRAVENOUS ONCE
Status: COMPLETED | OUTPATIENT
Start: 2019-11-24 | End: 2019-11-24

## 2019-11-24 RX ORDER — SODIUM CHLORIDE 0.9 % (FLUSH) 0.9 %
10 SYRINGE (ML) INJECTION
Status: DISCONTINUED | OUTPATIENT
Start: 2019-11-24 | End: 2019-12-08 | Stop reason: HOSPADM

## 2019-11-24 RX ORDER — GABAPENTIN 250 MG/5ML
100 SOLUTION ORAL EVERY 12 HOURS
Status: DISCONTINUED | OUTPATIENT
Start: 2019-11-24 | End: 2019-12-06

## 2019-11-24 RX ORDER — LORAZEPAM 2 MG/ML
0.5 INJECTION INTRAMUSCULAR ONCE
Status: COMPLETED | OUTPATIENT
Start: 2019-11-24 | End: 2019-11-24

## 2019-11-24 RX ORDER — POLYETHYLENE GLYCOL 3350 17 G/17G
17 POWDER, FOR SOLUTION ORAL 2 TIMES DAILY
Status: DISCONTINUED | OUTPATIENT
Start: 2019-11-24 | End: 2019-12-01

## 2019-11-24 RX ORDER — WARFARIN SODIUM 5 MG/1
5 TABLET ORAL DAILY
Status: DISCONTINUED | OUTPATIENT
Start: 2019-11-24 | End: 2019-11-25

## 2019-11-24 RX ORDER — ISOSORBIDE DINITRATE AND HYDRALAZINE HYDROCHLORIDE 37.5; 2 MG/1; MG/1
1 TABLET ORAL 3 TIMES DAILY
Status: DISCONTINUED | OUTPATIENT
Start: 2019-11-24 | End: 2019-12-06

## 2019-11-24 RX ORDER — ASPIRIN 81 MG/1
81 TABLET ORAL DAILY
Status: DISCONTINUED | OUTPATIENT
Start: 2019-11-25 | End: 2019-12-08 | Stop reason: HOSPADM

## 2019-11-24 RX ORDER — TRAMADOL HYDROCHLORIDE 50 MG/1
50 TABLET ORAL ONCE AS NEEDED
Status: COMPLETED | OUTPATIENT
Start: 2019-11-24 | End: 2019-11-24

## 2019-11-24 RX ORDER — PANTOPRAZOLE SODIUM 40 MG/1
40 TABLET, DELAYED RELEASE ORAL DAILY
Status: DISCONTINUED | OUTPATIENT
Start: 2019-11-25 | End: 2019-11-30

## 2019-11-24 RX ORDER — FUROSEMIDE 10 MG/ML
80 INJECTION INTRAMUSCULAR; INTRAVENOUS
Status: COMPLETED | OUTPATIENT
Start: 2019-11-24 | End: 2019-11-24

## 2019-11-24 RX ORDER — FERROUS SULFATE 325(65) MG
325 TABLET, DELAYED RELEASE (ENTERIC COATED) ORAL DAILY
Status: DISCONTINUED | OUTPATIENT
Start: 2019-11-25 | End: 2019-11-27

## 2019-11-24 RX ORDER — MIRTAZAPINE 7.5 MG/1
7.5 TABLET, FILM COATED ORAL NIGHTLY
Status: DISCONTINUED | OUTPATIENT
Start: 2019-11-24 | End: 2019-12-08 | Stop reason: HOSPADM

## 2019-11-24 RX ORDER — AMOXICILLIN 250 MG
1 CAPSULE ORAL 2 TIMES DAILY
Status: DISCONTINUED | OUTPATIENT
Start: 2019-11-24 | End: 2019-12-08 | Stop reason: HOSPADM

## 2019-11-24 RX ADMIN — FUROSEMIDE 80 MG: 10 INJECTION, SOLUTION INTRAMUSCULAR; INTRAVENOUS at 01:11

## 2019-11-24 RX ADMIN — GABAPENTIN 100 MG: 250 SOLUTION ORAL at 09:11

## 2019-11-24 RX ADMIN — MIRTAZAPINE 7.5 MG: 7.5 TABLET ORAL at 10:11

## 2019-11-24 RX ADMIN — CHLOROTHIAZIDE SODIUM 500 MG: 500 INJECTION, POWDER, LYOPHILIZED, FOR SOLUTION INTRAVENOUS at 06:11

## 2019-11-24 RX ADMIN — HYDRALAZINE HYDROCHLORIDE AND ISOSORBIDE DINITRATE 1 TABLET: 37.5; 2 TABLET, FILM COATED ORAL at 09:11

## 2019-11-24 RX ADMIN — LORAZEPAM 0.5 MG: 2 INJECTION INTRAMUSCULAR; INTRAVENOUS at 05:11

## 2019-11-24 RX ADMIN — POLYETHYLENE GLYCOL 3350 17 G: 17 POWDER, FOR SOLUTION ORAL at 09:11

## 2019-11-24 RX ADMIN — TRAMADOL HYDROCHLORIDE 50 MG: 50 TABLET, FILM COATED ORAL at 09:11

## 2019-11-24 RX ADMIN — FUROSEMIDE 120 MG: 10 INJECTION, SOLUTION INTRAMUSCULAR; INTRAVENOUS at 05:11

## 2019-11-24 RX ADMIN — WARFARIN SODIUM 5 MG: 5 TABLET ORAL at 05:11

## 2019-11-24 RX ADMIN — SENNOSIDES AND DOCUSATE SODIUM 1 TABLET: 8.6; 5 TABLET ORAL at 09:11

## 2019-11-24 NOTE — PROVIDER PROGRESS NOTES - EMERGENCY DEPT.
"Encounter Date: 11/24/2019    ED Physician Progress Notes        Physician Note:   Medical screening exam completed.  I have conducted a focused provider triage encounter, findings are as follows:    Brief history of present illness: Progressively worsening shortness of breath and generalized edema. Pt reports compliance with Lasix 80 bid but minimal urine output.  Patient reports some hemoptysis since Wednesday.  He reports compliance with his Coumadin.  No chest pain.    --------------------------------                  11/24/19                           1258           --------------------------------   BP:             137/68           Pulse:           106             Resp:           (!) 22           Temp:     97.8 °F (36.6 °C)      TempSrc:         Oral            SpO2:            97%             Weight: 123.6 kg (272 lb 8 oz)   Height:    5' 9" (1.753 m)      --------------------------------    Pertinent physical exam:  Increased work of breathing on room air.  Crackles at bases.  Tachypnea.  Tachycardia.    Brief workup plan:  Presentation consistent with hypervolemia.  Will obtain labs including type and screen.  Will obtain chest x-ray.  Cardiac monitoring.  EKG.    Preliminary workup initiated; this workup will be continued and followed by the physician or advanced practice provider that is assigned to the patient when roomed.       "

## 2019-11-24 NOTE — ED NOTES
"Pt is a 53 yo M admitted for CC of shortness of breath upon rest and exertion, beginning 3 days ago.  Pt rates their pain 8/10 in both lower extremities.   Pt states he uses 1.5L of oxygen at home, but "the machine is broken".   Pt denies headache, chest pain, fever, chills, nausea, vomiting, diarrhea, dizziness and loss of consciousness.     LOC: The patient is awake, alert, and aware of environment. The patient is oriented x 3 and speaking appropriately.   APPEARANCE: Pt appears to be in pain upon ambulation.   PSYCHOSOCIAL: Patient is calm and cooperative.   SKIN: The skin is warm, extremely dry and flaky. Bilateral lower extremities exhibit venous insufficiency and status dermatitis.    RESPIRATORY: Airway is open and patent. Bilateral chest rise and fall. Respirations are spontaneous, even and unlabored. Normal effort and rate noted. No accessory muscle use noted.   CARDIAC: Patient has a normal rate and rhythm. Denies chest pain or SOB.   ABDOMEN: Soft and non tender to palpation. No distention noted.   URINARY:  Voids independently.   EXTREMITIES: Moderate swelling noted in abdomen and bilateral lower extremities.   NEUROLOGIC: Eyes open spontaneously. Speech clear. Tolerating saliva secretions well. Able to follow commands, demonstrating ability to actively and appropriately communicate within context of current conversation. Symmetrical facial muscles. Moving all extremities well. Movement is purposeful.   MUSCULOSKELETAL: No obvious deformities noted.      "

## 2019-11-24 NOTE — ED PROVIDER NOTES
Encounter Date: 11/24/2019       History     Chief Complaint   Patient presents with    Shortness of Breath     sob with edema to legs     Patient is a 55 yo M with a PMH of combined CHF (last Echo 4/2019 with EF 23%, diastolic dysfuction, moderated MR and TR), HTN, chronic A. fib (on coumadin), A. flutter s/p ablation, CVA (in 2009), and CAD who presents to the ED with worsening LE swelling and shortness of breath for the past 4 days.  He states that he has been taking his 80mg of lasix BID appropriately but noticed that he has not produced much urine.  He states that he only urinates twice a day.  He is currently on 2L of NC at home.  He also endorses nose bleeds and spitting up blood for the past few weeks.  He states that when he spits up blood that it does not occur with vomiting or coughing.  He denies any fevers, chills, cough, abdominal pain, nausea, vomiting, or dysuria.          Review of patient's allergies indicates:   Allergen Reactions    Acetaminophen      Itching    Oxycodone-acetaminophen      Other reaction(s): Itching    Ace inhibitors Other (See Comments)     cough     Past Medical History:   Diagnosis Date    Anticoagulant long-term use     Cardiomegaly     Chronic combined systolic and diastolic congestive heart failure     Coronary artery disease     Heart attack 2006    Hypertension     Hyperthyroidism, subclinical 1/2/2013    MI (myocardial infarction) 9/22/2013    MI in 2009      Paroxysmal atrial fibrillation     PE (pulmonary embolism) 1/1/2013    IN 2010     S/P ablation of atrial flutter 2008    Stroke 2009    no residual weaknesses     Past Surgical History:   Procedure Laterality Date    RADIOFREQUENCY ABLATION  01/08/2008    for atrial flutter     Family History   Problem Relation Age of Onset    Hypertension Mother     Stroke Mother     Hypertension Father     Alcohol abuse Father     Hypertension Sister     Hypertension Brother      Social History     Tobacco  Use    Smoking status: Never Smoker    Smokeless tobacco: Never Used   Substance Use Topics    Alcohol use: No    Drug use: No     Review of Systems   Constitutional: Negative for chills and fever.   HENT: Negative for congestion and sore throat.    Eyes: Negative for photophobia and visual disturbance.   Respiratory: Positive for shortness of breath. Negative for cough and wheezing.    Cardiovascular: Positive for leg swelling. Negative for chest pain and palpitations.   Gastrointestinal: Positive for abdominal distention. Negative for abdominal pain, constipation, diarrhea, nausea and vomiting.   Genitourinary: Negative for difficulty urinating and dysuria.   Musculoskeletal: Negative for arthralgias and back pain.   Skin: Negative for pallor.   Neurological: Negative for dizziness, weakness and headaches.   Psychiatric/Behavioral: Negative for agitation and confusion.       Physical Exam     Initial Vitals [11/24/19 1258]   BP Pulse Resp Temp SpO2   137/68 106 (!) 22 97.8 °F (36.6 °C) 97 %      MAP       --         Physical Exam    Constitutional:   Patient is a 53 yo M who appears older than stated age, alert and in NAD.    HENT:   Head: Normocephalic and atraumatic.   Eyes: EOM are normal. Pupils are equal, round, and reactive to light.   Neck: Normal range of motion. No thyromegaly present.   Cardiovascular:   Tachycardic. Irregular rhythm.    Pulmonary/Chest:   Rales in BL lower lung fields.   3L of NC   Abdominal:   Distended abdomen throughout. Soft. (+) fluid wave.  Nontender.    Musculoskeletal:   Significant stasis dermatitis in BL lower extrem.   3+ pitting edema that is tender to palpation up to the knee.    Neurological: He is alert and oriented to person, place, and time.   Psychiatric: He has a normal mood and affect.         ED Course   Procedures  Labs Reviewed   CBC W/ AUTO DIFFERENTIAL   COMPREHENSIVE METABOLIC PANEL   TROPONIN I   B-TYPE NATRIURETIC PEPTIDE   MAGNESIUM   PROTIME-INR   TYPE  & SCREEN     EKG Readings: (Independently Interpreted)   Atrial fibrillation with RVR        Imaging Results          X-Ray Chest AP Portable (Final result)  Result time 11/24/19 13:29:22    Final result by Cristofer Roldan Jr., MD (11/24/19 13:29:22)                 Impression:      Increased opacification at the lung bases right greater than left.  Oval opacity right midlung field may be some loculated fluid in the fissure as it has developed since the prior study.      Electronically signed by: Cristofer Roldan MD  Date:    11/24/2019  Time:    13:29             Narrative:    EXAMINATION:  XR CHEST AP PORTABLE    CLINICAL HISTORY:  shortness of breath;    TECHNIQUE:  Single frontal view of the chest was performed.    COMPARISON:  October 23, 2019.    FINDINGS:  Heart is enlarged.  Increase in the pulmonary vascular markings.  There is increased opacification at the bases right more so than left.  Oval opacity right midlung field may be some loculated fluid or fluid in the fissure.  This has developed since the prior study.                                 Medical Decision Making:   History:   Old Records Summarized: records from previous admission(s).       <> Summary of Records: Patient is a 53 yo M with a PMH of combined CHF (last Echo 4/2019 with EF 23%, diastolic dysfuction, moderated MR and TR), HTN, chronic A. fib (on coumadin), A. flutter s/p ablation, CVA (in 2009), and CAD   Initial Assessment:   Patient is a 53 yo M with a significant PMH including CHF (EF 23%) who presents with worsening shortness of breath and lower extremity swelling for the past 4 days.  On presentation he is afebrile, in  Afib with RVR (110), tachypneic, and normotensive. He is sating above 95% on 3L.  His physical exam is consistent with volume overload, including rales, edema and anasarca.  His spitting up of blood is likely epistaxis from his oxygen at night.   Differential Diagnosis:   DDx includes but is not limited to:  -Acute on  chronic CHF exacerbation  -Atrial fibrillation with RVR  -Pneumonia: unlikely given afebrile, lack of cough or cold like symptoms    Clinical Tests:   Lab Tests: Ordered  Radiological Study: Ordered  ED Management:  Patient seen and examined.  Ordered CBC, CMP, troponins, BNP.  Ordered 80mg of lasix IV with strict In and Outs.   CXR showed: Increased opacification at the lung bases right greater than left.  Oval opacity right midlung field may be some loculated fluid in the fissure as it has developed since the prior study.  - will obtain new PA and lateral views to better interpret what the opacity in his midlung field     3:09 PM  CBC showed hemoglobin of 6.3, which is consistent with how low he has been since august.  Will hold off on transfusion at this point as patient is still noted to be volume overloaded.  He will likely require one once he is able to be diuresed more.     3:30 PM  Troponin is .085 which is consistent with prior values.  His BNP is 210, which is also similar to previous values last month.   Patient meets criteria for observation for further IV diuresis of his CHF.  Spoke to cardiology who states that he is familiar with the patient and that the patient is difficult to diuresis.  Recommended admitting to the CNJ service for closer management.   Dr. Collins will be assuming care of the patient from this point.               Attending Attestation:   Physician Attestation Statement for Resident:  As the supervising MD   Physician Attestation Statement: I have personally seen and examined this patient.   I agree with the above history. -:   As the supervising MD I agree with the above PE.    As the supervising MD I agree with the above treatment, course, plan, and disposition.   -: 54 y M w/ fluid overload, SOB, difficult to diurese  given 80 mg lasix in the ED w/ 150 ml urine output    cardiology consulted, recs for 120 mg lasix and diuril      Dg: anasarca, acute CHF  Admitted to medicine  I  have reviewed and agree with the residents interpretation of the following: lab data, x-rays and EKG.  I have reviewed the following: old records at this facility.                                  Clinical Impression:       ICD-10-CM ICD-9-CM   1. Shortness of breath R06.02 786.05                             Sameer Miller MD  Resident  11/24/19 1553       Joslyn Swann MD  11/24/19 8735

## 2019-11-25 LAB
ANION GAP SERPL CALC-SCNC: 10 MMOL/L (ref 8–16)
BASOPHILS # BLD AUTO: 0.04 K/UL (ref 0–0.2)
BASOPHILS NFR BLD: 0.7 % (ref 0–1.9)
BUN SERPL-MCNC: 58 MG/DL (ref 6–20)
CALCIUM SERPL-MCNC: 9.6 MG/DL (ref 8.7–10.5)
CHLORIDE SERPL-SCNC: 102 MMOL/L (ref 95–110)
CO2 SERPL-SCNC: 29 MMOL/L (ref 23–29)
CREAT SERPL-MCNC: 2.3 MG/DL (ref 0.5–1.4)
DIFFERENTIAL METHOD: ABNORMAL
EOSINOPHIL # BLD AUTO: 0.5 K/UL (ref 0–0.5)
EOSINOPHIL NFR BLD: 8.8 % (ref 0–8)
ERYTHROCYTE [DISTWIDTH] IN BLOOD BY AUTOMATED COUNT: 20.3 % (ref 11.5–14.5)
EST. GFR  (AFRICAN AMERICAN): 36.1 ML/MIN/1.73 M^2
EST. GFR  (NON AFRICAN AMERICAN): 31.3 ML/MIN/1.73 M^2
FERRITIN SERPL-MCNC: 40 NG/ML (ref 20–300)
GLUCOSE SERPL-MCNC: 96 MG/DL (ref 70–110)
HCT VFR BLD AUTO: 22.7 % (ref 40–54)
HGB BLD-MCNC: 6.2 G/DL (ref 14–18)
IMM GRANULOCYTES # BLD AUTO: 0.01 K/UL (ref 0–0.04)
IMM GRANULOCYTES NFR BLD AUTO: 0.2 % (ref 0–0.5)
INR PPP: 1.9 (ref 0.8–1.2)
LYMPHOCYTES # BLD AUTO: 0.9 K/UL (ref 1–4.8)
LYMPHOCYTES NFR BLD: 16 % (ref 18–48)
MAGNESIUM SERPL-MCNC: 2.5 MG/DL (ref 1.6–2.6)
MCH RBC QN AUTO: 20.9 PG (ref 27–31)
MCHC RBC AUTO-ENTMCNC: 27.3 G/DL (ref 32–36)
MCV RBC AUTO: 76 FL (ref 82–98)
MONOCYTES # BLD AUTO: 1.5 K/UL (ref 0.3–1)
MONOCYTES NFR BLD: 26.9 % (ref 4–15)
NEUTROPHILS # BLD AUTO: 2.7 K/UL (ref 1.8–7.7)
NEUTROPHILS NFR BLD: 47.4 % (ref 38–73)
NRBC BLD-RTO: 0 /100 WBC
PLATELET # BLD AUTO: 241 K/UL (ref 150–350)
PMV BLD AUTO: 10.8 FL (ref 9.2–12.9)
POTASSIUM SERPL-SCNC: 4.7 MMOL/L (ref 3.5–5.1)
PROTHROMBIN TIME: 18.3 SEC (ref 9–12.5)
RBC # BLD AUTO: 2.97 M/UL (ref 4.6–6.2)
SODIUM SERPL-SCNC: 141 MMOL/L (ref 136–145)
WBC # BLD AUTO: 5.68 K/UL (ref 3.9–12.7)

## 2019-11-25 PROCEDURE — 80048 BASIC METABOLIC PNL TOTAL CA: CPT

## 2019-11-25 PROCEDURE — 97802 MEDICAL NUTRITION INDIV IN: CPT

## 2019-11-25 PROCEDURE — 85610 PROTHROMBIN TIME: CPT

## 2019-11-25 PROCEDURE — 99233 PR SUBSEQUENT HOSPITAL CARE,LEVL III: ICD-10-PCS | Mod: ,,, | Performed by: HOSPITALIST

## 2019-11-25 PROCEDURE — 99233 SBSQ HOSP IP/OBS HIGH 50: CPT | Mod: ,,, | Performed by: HOSPITALIST

## 2019-11-25 PROCEDURE — 25000003 PHARM REV CODE 250: Performed by: HOSPITALIST

## 2019-11-25 PROCEDURE — 82728 ASSAY OF FERRITIN: CPT

## 2019-11-25 PROCEDURE — 11000001 HC ACUTE MED/SURG PRIVATE ROOM

## 2019-11-25 PROCEDURE — 85025 COMPLETE CBC W/AUTO DIFF WBC: CPT

## 2019-11-25 PROCEDURE — 83735 ASSAY OF MAGNESIUM: CPT

## 2019-11-25 PROCEDURE — 63600175 PHARM REV CODE 636 W HCPCS: Performed by: HOSPITALIST

## 2019-11-25 PROCEDURE — 36415 COLL VENOUS BLD VENIPUNCTURE: CPT

## 2019-11-25 RX ORDER — DIPHENHYDRAMINE HCL 25 MG
25 CAPSULE ORAL EVERY 6 HOURS PRN
Status: DISCONTINUED | OUTPATIENT
Start: 2019-11-25 | End: 2019-12-08 | Stop reason: HOSPADM

## 2019-11-25 RX ORDER — METOPROLOL SUCCINATE 25 MG/1
50 TABLET, EXTENDED RELEASE ORAL DAILY
Status: DISCONTINUED | OUTPATIENT
Start: 2019-11-25 | End: 2019-11-26

## 2019-11-25 RX ORDER — TRAMADOL HYDROCHLORIDE 50 MG/1
50 TABLET ORAL EVERY 6 HOURS PRN
Status: DISCONTINUED | OUTPATIENT
Start: 2019-11-25 | End: 2019-12-08 | Stop reason: HOSPADM

## 2019-11-25 RX ADMIN — TRAMADOL HYDROCHLORIDE 50 MG: 50 TABLET, FILM COATED ORAL at 06:11

## 2019-11-25 RX ADMIN — POLYETHYLENE GLYCOL 3350 17 G: 17 POWDER, FOR SOLUTION ORAL at 09:11

## 2019-11-25 RX ADMIN — HYDRALAZINE HYDROCHLORIDE AND ISOSORBIDE DINITRATE 1 TABLET: 37.5; 2 TABLET, FILM COATED ORAL at 02:11

## 2019-11-25 RX ADMIN — TRAMADOL HYDROCHLORIDE 50 MG: 50 TABLET, FILM COATED ORAL at 04:11

## 2019-11-25 RX ADMIN — FUROSEMIDE 120 MG: 10 INJECTION, SOLUTION INTRAMUSCULAR; INTRAVENOUS at 06:11

## 2019-11-25 RX ADMIN — ASPIRIN 81 MG: 81 TABLET, COATED ORAL at 09:11

## 2019-11-25 RX ADMIN — GABAPENTIN 100 MG: 250 SOLUTION ORAL at 09:11

## 2019-11-25 RX ADMIN — MIRTAZAPINE 7.5 MG: 7.5 TABLET ORAL at 09:11

## 2019-11-25 RX ADMIN — DIPHENHYDRAMINE HYDROCHLORIDE 25 MG: 25 CAPSULE ORAL at 10:11

## 2019-11-25 RX ADMIN — TRAMADOL HYDROCHLORIDE 50 MG: 50 TABLET, FILM COATED ORAL at 11:11

## 2019-11-25 RX ADMIN — SENNOSIDES AND DOCUSATE SODIUM 1 TABLET: 8.6; 5 TABLET ORAL at 09:11

## 2019-11-25 RX ADMIN — MENTHOL, METHYL SALICYLATE: 10; 15 CREAM TOPICAL at 04:11

## 2019-11-25 RX ADMIN — HYDRALAZINE HYDROCHLORIDE AND ISOSORBIDE DINITRATE 1 TABLET: 37.5; 2 TABLET, FILM COATED ORAL at 09:11

## 2019-11-25 RX ADMIN — FUROSEMIDE 120 MG: 10 INJECTION, SOLUTION INTRAMUSCULAR; INTRAVENOUS at 09:11

## 2019-11-25 RX ADMIN — FERROUS SULFATE TAB EC 325 MG (65 MG FE EQUIVALENT) 325 MG: 325 (65 FE) TABLET DELAYED RESPONSE at 09:11

## 2019-11-25 RX ADMIN — PANTOPRAZOLE SODIUM 40 MG: 40 TABLET, DELAYED RELEASE ORAL at 09:11

## 2019-11-25 RX ADMIN — WARFARIN SODIUM 8 MG: 3 TABLET ORAL at 06:11

## 2019-11-25 RX ADMIN — METOPROLOL SUCCINATE 50 MG: 50 TABLET, EXTENDED RELEASE ORAL at 11:11

## 2019-11-25 NOTE — ASSESSMENT & PLAN NOTE
Initial poor response to 80 mg of Lasix and patient given Lasix 120 mg IV Diuril 500 mg IV per Cardiology  Continue Entresto  Strict input and output  Obtain 3 sets of troponin I to rule out ischemic etiology for current symptoms  Abnormal opacity in middle lobe of right lung will obtain CT of the chest when patient is more medically stable

## 2019-11-25 NOTE — PROGRESS NOTES
[facility] Hospital Medicine  Progress Note    Primary Team: [facility]  Admit Date: 11/24/2019    Subjective:      HPI: This is a 54-year-old male with HFrEF (last 2D ECHO EF 23%, moderate MR, moderate TR, diastolic dysfunction).  hypertension, chronic atrial fibrillation, a flutter status post ablation, CVA in 2009, coronary artery disease, and obstructive sleep apnea who presents with a complaint of worsening edema and shortness of breath since 11 22,019. The patient denies noncompliance with medication, but he notes that he is urinating less with his current dose of Lasix.  He admits swelling in his thighs, abdomen, and lower extremities with weeping of his legs.  The patient also has cough shortness of breath at rest and with exertion, fatigue, and denies chest pain. He also has severe anemia with a hemoglobin of 6.2 which is consistent with his hemoglobin obtained on last admit on 10/23/2019.  The patient refuses blood products.  Chest x-ray was done which showed increase lobulated oval opacity in the right mid lung field which is a new finding compared to chest x-ray done on 10/23/2019.  BNP was done and was significant for a value of 210.  The patient was initially given a mg of Lasix IV with minimal response and this was followed by 120 mg IV per Cardiology.    Interval History:  Admitted overnight for ADHF on IV Lasix. Down to 270 lb this AM. SOB improving. On 1.5 L NC at home.     ROS:  As per HPI  General: no fever, no chills, no weight loss, no fatigue  Cardiovascular: no chest pain  Respiratory: no cough, no wheezes  All other systems reviewed & are negative.     Past Medical History:   Diagnosis Date    Anticoagulant long-term use     Cardiomegaly     Chronic combined systolic and diastolic congestive heart failure     Coronary artery disease     Heart attack 2006    Hypertension     Hyperthyroidism, subclinical 1/2/2013    MI (myocardial infarction) 9/22/2013    MI in  "2009      Paroxysmal atrial fibrillation     PE (pulmonary embolism) 2013    IN 2010     S/P ablation of atrial flutter 2008    Stroke 2009    no residual weaknesses     Past Surgical History:   Procedure Laterality Date    RADIOFREQUENCY ABLATION  2008    for atrial flutter     Social History     Socioeconomic History    Marital status: Single     Spouse name: Not on file    Number of children: Not on file    Years of education: Not on file    Highest education level: Not on file   Occupational History    Not on file   Social Needs    Financial resource strain: Not on file    Food insecurity:     Worry: Not on file     Inability: Not on file    Transportation needs:     Medical: Not on file     Non-medical: Not on file   Tobacco Use    Smoking status: Never Smoker    Smokeless tobacco: Never Used   Substance and Sexual Activity    Alcohol use: No    Drug use: No    Sexual activity: Never   Lifestyle    Physical activity:     Days per week: Not on file     Minutes per session: Not on file    Stress: Not on file   Relationships    Social connections:     Talks on phone: Not on file     Gets together: Not on file     Attends Yazidi service: Not on file     Active member of club or organization: Not on file     Attends meetings of clubs or organizations: Not on file     Relationship status: Not on file   Other Topics Concern    Not on file   Social History Narrative    Not on file         Objective:   Last 24 Hour Vital Signs:  BP  Min: 110/63  Max: 187/86  Temp  Av.9 °F (36.6 °C)  Min: 97.3 °F (36.3 °C)  Max: 98.7 °F (37.1 °C)  Pulse  Av.9  Min: 77  Max: 107  Resp  Av.7  Min: 16  Max: 23  SpO2  Av.8 %  Min: 94 %  Max: 100 %  Height  Av' 9" (175.3 cm)  Min: 5' 9" (175.3 cm)  Max: 5' 9" (175.3 cm)  Weight  Av.6 kg (276 lb 14.4 oz)  Min: 125.6 kg (276 lb 14.4 oz)  Max: 125.6 kg (276 lb 14.4 oz)  I/O last 3 completed shifts:  In: 340 [P.O.:290; IV " Piggyback:50]  Out: 2000 [Urine:2000]    Physical Examination:  GEN: AAOx3, NAD, appears mildly short of breath  HEENT: NCAT, MMM, PERRL, EOMI, oropharynx clear  CV: RRR, no m/r, no S3/S4  RESP: CTAB, no wheezes/crackles, no increased WOB  ABD: soft, NTND, normoactive BS, no organomegaly  EXTR: no c/c, intact distal pulses x 4, BLE edema  NEURO: PERRL, EOMI, moving all four extremities, intact sensation to light touch, no focal deficits  SKIN: no rashes, lesions, or color changes  PSYCH: normal affect    Laboratory:  I have reviewed all pertinent lab results/findings within the past 24 hours.    Radiology:  I have reviewed all pertinent imaging results/findings within the past 24 hours.    Current Medications:     Infusions:       Scheduled:   aspirin  81 mg Oral Daily    ferrous sulfate  325 mg Oral Daily    furosemide  120 mg Intravenous BID    gabapentin  100 mg Oral Q12H    isosorbide-hydrALAZINE 20-37.5 mg  1 tablet Oral TID    metoprolol succinate  50 mg Oral Daily    mirtazapine  7.5 mg Oral QHS    pantoprazole  40 mg Oral Daily    polyethylene glycol  17 g Oral BID    senna-docusate 8.6-50 mg  1 tablet Oral BID    warfarin  8 mg Oral Daily        PRN:  calcium carbonate, melatonin, methyl salicylate-menthol 15-10%, sodium chloride 0.9%, traMADol    Prior records reviewed.     ECHO reviewed:  · Severely decreased left ventricular systolic function. The estimated ejection fraction is 23%  · Eccentric left ventricular hypertrophy.  · Left ventricular diastolic dysfunction.  · Septal wall has abnormal motion.  · Local segmental wall motion abnormalities.  · Severe left atrial enlargement.  · Moderate right ventricular enlargement.  · Mildly reduced right ventricular systolic function.  · Severe right atrial enlargement.  · Moderate mitral regurgitation.  · Moderate tricuspid regurgitation.  · Elevated central venous pressure (15 mm Hg).  · The estimated PA systolic pressure is 47 mm Hg  · Pulmonary  hypertension present.  · Trivial Pericardial effusion.    Assessment/Plan:     Hamlet Terrell is a 54 y.o.male with    Acute on chronic combined systolic and diastolic heart failure  Initial poor response to 80 mg of Lasix and patient given Lasix 120 mg IV and Diuril 500 mg IV on admission per Cardiology.  EF 23%.  Continue Bidil.  Lopressor changed to Toprol for reduced EF.  Troponin elevation is chronic.  Cont IV Lasix 120 mg BID.     Abnormal CT  Abnormal opacity in middle lobe of right lung  Non-smoker  Obtain CT of the chest when patient is more euvolemic     JEAN-PIERRE on CKD3  Cr 1.9 > 2.3 (baseline 1.6-1.7)  Monitor on diuresis  Continue to monitor electrolytes     Essential hypertension  Continue usual antihypertensive regimen     Atrial fibrillation, chronic  Chronic anticoagulation  INR 1.9, subtherapeutic  Received lower dose of coumadin yesterday than his normal home dosing. Will resume home dosing.   Daily INR's  HR controlled. Continue metoprolol     Iron deficiency anemia  Hb stable at 6.2 and at baseline. No bleeding. Repeat ferritin level. May consider iron infusion since patient refuses blood products     History of CVA (cerebrovascular accident)  No acute management     Chronic respiratory failure  1.5 L NC at home  Reports malfunctioning O2 tank at home - consult CM         Antoinette Goins MD  Hospital Medicine Staff  [facility]

## 2019-11-25 NOTE — HPI
This is a 54-year-old male with HFrEF (last 2D ECHO EF 23%, moderate MR, moderate TR, diastolic dysfunction).  hypertension, chronic atrial fibrillation, a flutter status post ablation, CVA in 2009, coronary artery disease, and obstructive sleep apnea who presents with a complaint of worsening edema and shortness of breath since 11 22,019. The patient denies noncompliance with medication, but he notes that he is urinating less with his current dose of Lasix.  He admits swelling in his thighs, abdomen, and lower extremities with weeping of his legs.  The patient also has cough shortness of breath at rest and with exertion, fatigue, and denies chest pain. He also has severe anemia with a hemoglobin of 6.2 which is consistent with his hemoglobin obtained on last admit on 10/23/2019.  The patient refuses blood products.  Chest x-ray was done which showed increase lobulated oval opacity in the right mid lung field which is a new finding compared to chest x-ray done on 10/23/2019.  BNP was done and was significant for a value of 210.  The patient was initially given a mg of Lasix IV with minimal response and this was followed by 120 mg IV per Cardiology.

## 2019-11-25 NOTE — PLAN OF CARE
POC reviewed with patient, who verbalized understanding. AAOx4.   Remains free of falls and injury. Edema +4 to BLE. Elevated with results.   VSS. Wounds BLE blister.  2L NC. Shortness of breath with exertion.  Tolerating diet with fluid restriction. No Nausea. Pain BLE .   IVF infusing. Voiding per urinal, commode. X BM.   Up with assist to BC. Up ad carlotta. Hgb 6.2 MD team is aware.   Telemetry monitor A-fib.  No acute events. No distress noted. Call bell in reach.   Will continue to monitor.

## 2019-11-25 NOTE — PLAN OF CARE
11/25/19 0807   Discharge Assessment   Assessment Type Discharge Planning Assessment   Assessment information obtained from? Medical Record   Expected Length of Stay (days) 4   Prior to hospitilization cognitive status: Alert/Oriented   Prior to hospitalization functional status: Independent   Current cognitive status: Alert/Oriented   Current Functional Status: Independent   Lives With alone   Able to Return to Prior Arrangements yes   Is patient able to care for self after discharge? Yes   Patient's perception of discharge disposition home or selfcare;home health   Readmission Within the Last 30 Days no previous admission in last 30 days   Patient currently being followed by outpatient case management? No   Equipment Currently Used at Home none   Do you have any problems affording any of your prescribed medications? TBD   Is the patient taking medications as prescribed? yes   Does the patient have transportation home? Yes   Transportation Anticipated other (see comments)  (Medicaid Transportation)   Does the patient receive services at the [facility]? No   Discharge Plan A Home;Home Health   DME Needed Upon Discharge  none   Admitted with CHF. Known to this CM from previous admits. Lives alone and is independent in his ADLs. Plan is to DC home. Current with Gianluca PRINCE.

## 2019-11-25 NOTE — ED NOTES
Pt care assumed. Report received by DIANA Butt. Pt lying in stretcher in low and locked position and side rails raised x2. Call light, pt's belongings, and bedside table within pt's reach. Pt on continuous cardiac monitoring, pulse oximetry, and BP cycling every 30 minutes. Pt in NAD and verbalized no needs at this time.

## 2019-11-25 NOTE — PLAN OF CARE
11/25/19 0812   Post-Acute Status   Post-Acute Authorization Home Health/Hospice   Home Health/Hospice Status Awaiting Internal Medical Clearance

## 2019-11-25 NOTE — CONSULTS
Food & Nutrition  Education    Diet Education: Heart Failure  Time Spent: 30 minutes  Learners: Pt    Nutrition Education provided with handouts: Heart Failure Nutrition Therapy    Comments: Reviewed sodium restriction, foods high in sodium, and encouraged pt to flavor food with herbs/spices/salt-free seasoning instead of salt. Reviewed fluid restriction and foods considered fluids (ice cream, Jell-O, popsicles, etc.) Pt voiced understanding.    All questions and concerns answered. Dietitian's contact information provided.     Follow-Up: PRN or LOS day 10    Please Re-consult as needed  Thanks!

## 2019-11-25 NOTE — PROGRESS NOTES
Notified doctor about patient having a run of V-tach with a HR of 163. No new orders received at this time. Will continue to monitor.

## 2019-11-25 NOTE — PLAN OF CARE
Patient resting in bed watching tv and eating dinner. Patient was medicated per orders through out shift. VSS. No pain noted at this time. Patient voices no needs or concerns at this time. Bed in lowest position. Side rails up x2. Call light in reach.

## 2019-11-25 NOTE — H&P
[facility] Hospital Medicine  History & Physical    Patient Name: Hamlet Terrell  MRN: [id number]  Admission Date: 11/24/2019  Attending Physician: Antoinette Goins MD   Primary Care Provider: Carlin Swift MD    [facility] Medicine Team: [facility] Kennedi Hirsch MD     Patient information was obtained from patient, past medical records and ER records.     Subjective:     Principal Problem:Acute on chronic combined systolic and diastolic heart failure    Chief Complaint:   Chief Complaint   Patient presents with    Shortness of Breath     sob with edema to legs        HPI: This is a 54-year-old male with HFrEF (last 2D ECHO EF 23%, moderate MR, moderate TR, diastolic dysfunction).  hypertension, chronic atrial fibrillation, a flutter status post ablation, CVA in 2009, coronary artery disease, and obstructive sleep apnea who presents with a complaint of worsening edema and shortness of breath since 11 22,019. The patient denies noncompliance with medication, but he notes that he is urinating less with his current dose of Lasix.  He admits swelling in his thighs, abdomen, and lower extremities with weeping of his legs.  The patient also has cough shortness of breath at rest and with exertion, fatigue, and denies chest pain. He also has severe anemia with a hemoglobin of 6.2 which is consistent with his hemoglobin obtained on last admit on 10/23/2019.  The patient refuses blood products.  Chest x-ray was done which showed increase lobulated oval opacity in the right mid lung field which is a new finding compared to chest x-ray done on 10/23/2019.  BNP was done and was significant for a value of 210.  The patient was initially given a mg of Lasix IV with minimal response and this was followed by 120 mg IV per Cardiology.    Past Medical History:   Diagnosis Date    Anticoagulant long-term use     Cardiomegaly     Chronic combined systolic and diastolic congestive heart failure     Coronary  artery disease     Heart attack 2006    Hypertension     Hyperthyroidism, subclinical 1/2/2013    MI (myocardial infarction) 9/22/2013    MI in 2009      Paroxysmal atrial fibrillation     PE (pulmonary embolism) 1/1/2013    IN 2010     S/P ablation of atrial flutter 2008    Stroke 2009    no residual weaknesses       Past Surgical History:   Procedure Laterality Date    RADIOFREQUENCY ABLATION  01/08/2008    for atrial flutter       Review of patient's allergies indicates:   Allergen Reactions    Acetaminophen      Itching    Oxycodone-acetaminophen      Other reaction(s): Itching    Ace inhibitors Other (See Comments)     cough       No current facility-administered medications on file prior to encounter.      Current Outpatient Medications on File Prior to Encounter   Medication Sig    albuterol (PROVENTIL/VENTOLIN HFA) 90 mcg/actuation inhaler Inhale 1-2 puffs into the lungs every 6 (six) hours as needed for Wheezing. Rescue    aspirin (ECOTRIN) 81 MG EC tablet Take 81 mg by mouth once daily.    calcium carbonate (TUMS) 200 mg calcium (500 mg) chewable tablet Take 1 tablet (500 mg total) by mouth 3 (three) times daily as needed.    ferrous sulfate 325 (65 FE) MG EC tablet Take 1 tablet (325 mg total) by mouth once daily.    furosemide (LASIX) 80 MG tablet Take 1 tablet (80 mg total) by mouth 2 (two) times daily.    gabapentin (NEURONTIN) 250 mg/5 mL (5 mL) solution Take 2 mLs (100 mg total) by mouth 2 (two) times daily.    isosorbide-hydrALAZINE 20-37.5 mg (BIDIL) 20-37.5 mg Tab Take 1 tablet by mouth 2 (two) times daily.    methyl salicylate-menthol 15-10% 15-10 % Crea Apply topically 3 (three) times daily.    metoprolol tartrate (LOPRESSOR) 25 MG tablet Take 1 tablet (25 mg total) by mouth every 6 (six) hours.    nitroGLYCERIN (NITROSTAT) 0.4 MG SL tablet Place 1 tablet (0.4 mg total) under the tongue every 5 (five) minutes as needed for Chest pain. Tablet, Sublingual Sublingual     pantoprazole (PROTONIX) 40 MG tablet Take 1 tablet (40 mg total) by mouth once daily.    polyethylene glycol (GLYCOLAX) 17 gram PwPk Take 17 g by mouth 2 (two) times daily.    ramelteon (ROZEREM) 8 mg tablet Take 1 tablet (8 mg total) by mouth nightly as needed for Insomnia.    senna-docusate 8.6-50 mg (PERICOLACE) 8.6-50 mg per tablet Take 1 tablet by mouth 2 (two) times daily.    warfarin (COUMADIN) 5 MG tablet Take 1 tablet (5 mg total) by mouth Daily. Take as directed by coumadin clinic     Family History     Problem Relation (Age of Onset)    Alcohol abuse Father    Hypertension Mother, Father, Sister, Brother    Stroke Mother        Tobacco Use    Smoking status: Never Smoker    Smokeless tobacco: Never Used   Substance and Sexual Activity    Alcohol use: No    Drug use: No    Sexual activity: Never     Review of Systems   Constitutional: Positive for activity change and fatigue.   HENT: Negative for congestion.    Eyes: Negative for visual disturbance.   Respiratory: Positive for cough and shortness of breath.    Cardiovascular: Positive for leg swelling.   Gastrointestinal: Positive for abdominal distention.   Genitourinary: Positive for difficulty urinating.   Musculoskeletal: Negative for arthralgias.   Skin: Positive for color change.        Hyperpigmentation of the lower extremities bilaterally   Neurological: Positive for weakness.   Hematological:        Chronic anemia     Psychiatric/Behavioral: Negative for agitation and behavioral problems. The patient is not nervous/anxious.      Objective:     Vital Signs (Most Recent):  Temp: 97.3 °F (36.3 °C) (11/24/19 2059)  Pulse: 88 (11/24/19 2059)  Resp: (!) 21 (11/24/19 2059)  BP: 138/78 (11/24/19 2059)  SpO2: 99 % (11/24/19 2059) Vital Signs (24h Range):  Temp:  [97.3 °F (36.3 °C)-97.8 °F (36.6 °C)] 97.3 °F (36.3 °C)  Pulse:  [] 88  Resp:  [15-22] 21  SpO2:  [94 %-100 %] 99 %  BP: (137-187)/(62-86) 138/78     Weight: 125.6 kg (276 lb 14.4  oz)  Body mass index is 40.89 kg/m².    Physical Exam   Constitutional: He is oriented to person, place, and time. He appears distressed.   Morbidly obese male with labored respirations acutely ill-appearing cooperative with exam   HENT:   Head: Normocephalic and atraumatic.   Mouth/Throat: Oropharynx is clear and moist.   Eyes: Pupils are equal, round, and reactive to light. EOM are normal.   Neck: Normal range of motion. Neck supple. JVD present.   Cardiovascular: Regular rhythm. Exam reveals no gallop and no friction rub.   No murmur heard.  Tachycardic   Pulmonary/Chest: He is in respiratory distress.   Labored respirations   Abdominal: Soft. Bowel sounds are normal. He exhibits distension. There is no tenderness.   Dullness to percussion   Musculoskeletal: He exhibits edema.   Three to 4+ pitting edema bilaterally   Neurological: He is alert and oriented to person, place, and time.   Skin: Skin is dry.   Hyper pigmentation of the lower extremities bilaterally   Psychiatric: He has a normal mood and affect. His behavior is normal. Judgment and thought content normal.         CRANIAL NERVES     CN III, IV, VI   Pupils are equal, round, and reactive to light.  Extraocular motions are normal.        Significant Labs:   ABGs:   Recent Labs   Lab 11/24/19  1418   PH 7.354   PCO2 37.8   HCO3 21.1*   POCSATURATED 23*   BE -4     CBC:   Recent Labs   Lab 11/24/19  1404   WBC 6.40   HGB 6.2*   HCT 22.5*        CMP:   Recent Labs   Lab 11/24/19  1539      K 4.1      CO2 24   GLU 86   BUN 53*   CREATININE 1.9*   CALCIUM 9.2   PROT 8.3   ALBUMIN 3.5   BILITOT 2.0*   ALKPHOS 189*   AST 21   ALT 11   ANIONGAP 13   EGFRNONAA 39.4*     Magnesium:   Recent Labs   Lab 11/24/19  1539   MG 2.4     Troponin:   Recent Labs   Lab 11/24/19  1404   TROPONINI 0.085*       Significant Imaging: I have reviewed and interpreted all pertinent imaging results/findings within the past 24 hours.    Assessment/Plan:     * Acute  on chronic combined systolic and diastolic heart failure  Initial poor response to 80 mg of Lasix and patient given Lasix 120 mg IV Diuril 500 mg IV per Cardiology  Continue Entresto  Strict input and output  Obtain 3 sets of troponin I to rule out ischemic etiology for current symptoms  Abnormal opacity in middle lobe of right lung will obtain CT of the chest when patient is more medically stable      Essential hypertension  Continue usual antihypertensive regimen c      Atrial fibrillation, chronic  Coumadin for anticoagulation  Daily INR, currently therapeutic      Stage 3 chronic kidney disease  Appears stable  Continue to monitor electrolytes      Microcytic anemia  May consider iron infusion since patient refuses blood products      TAPAN (obstructive sleep apnea)        Personal history of MI (myocardial infarction)  Trend troponin I      History of CVA (cerebrovascular accident)  No acute management      Chronic anticoagulation  INR 2.0  Continue to monitor daily        VTE Risk Mitigation (From admission, onward)         Ordered     warfarin (COUMADIN) tablet 5 mg  Daily      11/24/19 1650     Reason for No Pharmacological VTE Prophylaxis  Once     Question:  Reasons:  Answer:  Already adequately anticoagulated on oral Anticoagulants    11/24/19 1650     IP VTE HIGH RISK PATIENT  Once      11/24/19 1650     Place sequential compression device  Until discontinued      11/24/19 1650     Place CAMI hose  Until discontinued      11/24/19 1650                   Kennedi Hirsch MD  Department of Hospital Medicine   [facility]

## 2019-11-25 NOTE — SUBJECTIVE & OBJECTIVE
Past Medical History:   Diagnosis Date    Anticoagulant long-term use     Cardiomegaly     Chronic combined systolic and diastolic congestive heart failure     Coronary artery disease     Heart attack 2006    Hypertension     Hyperthyroidism, subclinical 1/2/2013    MI (myocardial infarction) 9/22/2013    MI in 2009      Paroxysmal atrial fibrillation     PE (pulmonary embolism) 1/1/2013    IN 2010     S/P ablation of atrial flutter 2008    Stroke 2009    no residual weaknesses       Past Surgical History:   Procedure Laterality Date    RADIOFREQUENCY ABLATION  01/08/2008    for atrial flutter       Review of patient's allergies indicates:   Allergen Reactions    Acetaminophen      Itching    Oxycodone-acetaminophen      Other reaction(s): Itching    Ace inhibitors Other (See Comments)     cough       No current facility-administered medications on file prior to encounter.      Current Outpatient Medications on File Prior to Encounter   Medication Sig    albuterol (PROVENTIL/VENTOLIN HFA) 90 mcg/actuation inhaler Inhale 1-2 puffs into the lungs every 6 (six) hours as needed for Wheezing. Rescue    aspirin (ECOTRIN) 81 MG EC tablet Take 81 mg by mouth once daily.    calcium carbonate (TUMS) 200 mg calcium (500 mg) chewable tablet Take 1 tablet (500 mg total) by mouth 3 (three) times daily as needed.    ferrous sulfate 325 (65 FE) MG EC tablet Take 1 tablet (325 mg total) by mouth once daily.    furosemide (LASIX) 80 MG tablet Take 1 tablet (80 mg total) by mouth 2 (two) times daily.    gabapentin (NEURONTIN) 250 mg/5 mL (5 mL) solution Take 2 mLs (100 mg total) by mouth 2 (two) times daily.    isosorbide-hydrALAZINE 20-37.5 mg (BIDIL) 20-37.5 mg Tab Take 1 tablet by mouth 2 (two) times daily.    methyl salicylate-menthol 15-10% 15-10 % Crea Apply topically 3 (three) times daily.    metoprolol tartrate (LOPRESSOR) 25 MG tablet Take 1 tablet (25 mg total) by mouth every 6 (six) hours.     nitroGLYCERIN (NITROSTAT) 0.4 MG SL tablet Place 1 tablet (0.4 mg total) under the tongue every 5 (five) minutes as needed for Chest pain. Tablet, Sublingual Sublingual    pantoprazole (PROTONIX) 40 MG tablet Take 1 tablet (40 mg total) by mouth once daily.    polyethylene glycol (GLYCOLAX) 17 gram PwPk Take 17 g by mouth 2 (two) times daily.    ramelteon (ROZEREM) 8 mg tablet Take 1 tablet (8 mg total) by mouth nightly as needed for Insomnia.    senna-docusate 8.6-50 mg (PERICOLACE) 8.6-50 mg per tablet Take 1 tablet by mouth 2 (two) times daily.    warfarin (COUMADIN) 5 MG tablet Take 1 tablet (5 mg total) by mouth Daily. Take as directed by coumadin clinic     Family History     Problem Relation (Age of Onset)    Alcohol abuse Father    Hypertension Mother, Father, Sister, Brother    Stroke Mother        Tobacco Use    Smoking status: Never Smoker    Smokeless tobacco: Never Used   Substance and Sexual Activity    Alcohol use: No    Drug use: No    Sexual activity: Never     Review of Systems   Constitutional: Positive for activity change and fatigue.   HENT: Negative for congestion.    Eyes: Negative for visual disturbance.   Respiratory: Positive for cough and shortness of breath.    Cardiovascular: Positive for leg swelling.   Gastrointestinal: Positive for abdominal distention.   Genitourinary: Positive for difficulty urinating.   Musculoskeletal: Negative for arthralgias.   Skin: Positive for color change.        Hyperpigmentation of the lower extremities bilaterally   Neurological: Positive for weakness.   Hematological:        Chronic anemia     Psychiatric/Behavioral: Negative for agitation and behavioral problems. The patient is not nervous/anxious.      Objective:     Vital Signs (Most Recent):  Temp: 97.3 °F (36.3 °C) (11/24/19 2059)  Pulse: 88 (11/24/19 2059)  Resp: (!) 21 (11/24/19 2059)  BP: 138/78 (11/24/19 2059)  SpO2: 99 % (11/24/19 2059) Vital Signs (24h Range):  Temp:  [97.3 °F (36.3  °C)-97.8 °F (36.6 °C)] 97.3 °F (36.3 °C)  Pulse:  [] 88  Resp:  [15-22] 21  SpO2:  [94 %-100 %] 99 %  BP: (137-187)/(62-86) 138/78     Weight: 125.6 kg (276 lb 14.4 oz)  Body mass index is 40.89 kg/m².    Physical Exam   Constitutional: He is oriented to person, place, and time. He appears distressed.   Morbidly obese male with labored respirations acutely ill-appearing cooperative with exam   HENT:   Head: Normocephalic and atraumatic.   Mouth/Throat: Oropharynx is clear and moist.   Eyes: Pupils are equal, round, and reactive to light. EOM are normal.   Neck: Normal range of motion. Neck supple. JVD present.   Cardiovascular: Regular rhythm. Exam reveals no gallop and no friction rub.   No murmur heard.  Tachycardic   Pulmonary/Chest: He is in respiratory distress.   Labored respirations   Abdominal: Soft. Bowel sounds are normal. He exhibits distension. There is no tenderness.   Dullness to percussion   Musculoskeletal: He exhibits edema.   Three to 4+ pitting edema bilaterally   Neurological: He is alert and oriented to person, place, and time.   Skin: Skin is dry.   Hyper pigmentation of the lower extremities bilaterally   Psychiatric: He has a normal mood and affect. His behavior is normal. Judgment and thought content normal.         CRANIAL NERVES     CN III, IV, VI   Pupils are equal, round, and reactive to light.  Extraocular motions are normal.        Significant Labs:   ABGs:   Recent Labs   Lab 11/24/19  1418   PH 7.354   PCO2 37.8   HCO3 21.1*   POCSATURATED 23*   BE -4     CBC:   Recent Labs   Lab 11/24/19  1404   WBC 6.40   HGB 6.2*   HCT 22.5*        CMP:   Recent Labs   Lab 11/24/19  1539      K 4.1      CO2 24   GLU 86   BUN 53*   CREATININE 1.9*   CALCIUM 9.2   PROT 8.3   ALBUMIN 3.5   BILITOT 2.0*   ALKPHOS 189*   AST 21   ALT 11   ANIONGAP 13   EGFRNONAA 39.4*     Magnesium:   Recent Labs   Lab 11/24/19  1539   MG 2.4     Troponin:   Recent Labs   Lab 11/24/19  1404    TROPONINI 0.085*       Significant Imaging: I have reviewed and interpreted all pertinent imaging results/findings within the past 24 hours.

## 2019-11-26 LAB
ALLENS TEST: ABNORMAL
ANION GAP SERPL CALC-SCNC: 11 MMOL/L (ref 8–16)
BASOPHILS # BLD AUTO: 0.04 K/UL (ref 0–0.2)
BASOPHILS NFR BLD: 0.6 % (ref 0–1.9)
BUN SERPL-MCNC: 64 MG/DL (ref 6–20)
CALCIUM SERPL-MCNC: 9.3 MG/DL (ref 8.7–10.5)
CHLORIDE SERPL-SCNC: 99 MMOL/L (ref 95–110)
CO2 SERPL-SCNC: 28 MMOL/L (ref 23–29)
CREAT SERPL-MCNC: 3.2 MG/DL (ref 0.5–1.4)
DELSYS: ABNORMAL
DIFFERENTIAL METHOD: ABNORMAL
EOSINOPHIL # BLD AUTO: 0.5 K/UL (ref 0–0.5)
EOSINOPHIL NFR BLD: 7 % (ref 0–8)
ERYTHROCYTE [DISTWIDTH] IN BLOOD BY AUTOMATED COUNT: 20.2 % (ref 11.5–14.5)
EST. GFR  (AFRICAN AMERICAN): 24.2 ML/MIN/1.73 M^2
EST. GFR  (NON AFRICAN AMERICAN): 21 ML/MIN/1.73 M^2
FLOW: 2
GLUCOSE SERPL-MCNC: 76 MG/DL (ref 70–110)
HCO3 UR-SCNC: 26.4 MMOL/L (ref 24–28)
HCT VFR BLD AUTO: 22.7 % (ref 40–54)
HGB BLD-MCNC: 6.1 G/DL (ref 14–18)
IMM GRANULOCYTES # BLD AUTO: 0.02 K/UL (ref 0–0.04)
IMM GRANULOCYTES NFR BLD AUTO: 0.3 % (ref 0–0.5)
INR PPP: 2 (ref 0.8–1.2)
LACTATE SERPL-SCNC: 1 MMOL/L (ref 0.5–2.2)
LYMPHOCYTES # BLD AUTO: 1 K/UL (ref 1–4.8)
LYMPHOCYTES NFR BLD: 14.8 % (ref 18–48)
MAGNESIUM SERPL-MCNC: 2.5 MG/DL (ref 1.6–2.6)
MCH RBC QN AUTO: 20.7 PG (ref 27–31)
MCHC RBC AUTO-ENTMCNC: 26.9 G/DL (ref 32–36)
MCV RBC AUTO: 77 FL (ref 82–98)
MODE: ABNORMAL
MONOCYTES # BLD AUTO: 1.7 K/UL (ref 0.3–1)
MONOCYTES NFR BLD: 25.8 % (ref 4–15)
NEUTROPHILS # BLD AUTO: 3.3 K/UL (ref 1.8–7.7)
NEUTROPHILS NFR BLD: 51.5 % (ref 38–73)
NRBC BLD-RTO: 1 /100 WBC
PCO2 BLDA: 47.6 MMHG (ref 35–45)
PH SMN: 7.35 [PH] (ref 7.35–7.45)
PLATELET # BLD AUTO: 269 K/UL (ref 150–350)
PMV BLD AUTO: 10.8 FL (ref 9.2–12.9)
PO2 BLDA: 85 MMHG (ref 80–100)
POC BE: 1 MMOL/L
POC SATURATED O2: 96 % (ref 95–100)
POC TCO2: 28 MMOL/L (ref 23–27)
POTASSIUM SERPL-SCNC: 5.1 MMOL/L (ref 3.5–5.1)
PROTHROMBIN TIME: 19.1 SEC (ref 9–12.5)
RBC # BLD AUTO: 2.95 M/UL (ref 4.6–6.2)
SAMPLE: ABNORMAL
SITE: ABNORMAL
SODIUM SERPL-SCNC: 138 MMOL/L (ref 136–145)
WBC # BLD AUTO: 6.44 K/UL (ref 3.9–12.7)

## 2019-11-26 PROCEDURE — 99233 PR SUBSEQUENT HOSPITAL CARE,LEVL III: ICD-10-PCS | Mod: ,,, | Performed by: INTERNAL MEDICINE

## 2019-11-26 PROCEDURE — 20600001 HC STEP DOWN PRIVATE ROOM

## 2019-11-26 PROCEDURE — 25000003 PHARM REV CODE 250: Performed by: HOSPITALIST

## 2019-11-26 PROCEDURE — 36600 WITHDRAWAL OF ARTERIAL BLOOD: CPT

## 2019-11-26 PROCEDURE — 99233 SBSQ HOSP IP/OBS HIGH 50: CPT | Mod: ,,, | Performed by: HOSPITALIST

## 2019-11-26 PROCEDURE — 85610 PROTHROMBIN TIME: CPT

## 2019-11-26 PROCEDURE — 99900035 HC TECH TIME PER 15 MIN (STAT)

## 2019-11-26 PROCEDURE — 63600175 PHARM REV CODE 636 W HCPCS: Performed by: STUDENT IN AN ORGANIZED HEALTH CARE EDUCATION/TRAINING PROGRAM

## 2019-11-26 PROCEDURE — 63600175 PHARM REV CODE 636 W HCPCS: Performed by: HOSPITALIST

## 2019-11-26 PROCEDURE — 85025 COMPLETE CBC W/AUTO DIFF WBC: CPT

## 2019-11-26 PROCEDURE — 99233 PR SUBSEQUENT HOSPITAL CARE,LEVL III: ICD-10-PCS | Mod: ,,, | Performed by: HOSPITALIST

## 2019-11-26 PROCEDURE — 99233 SBSQ HOSP IP/OBS HIGH 50: CPT | Mod: ,,, | Performed by: INTERNAL MEDICINE

## 2019-11-26 PROCEDURE — 83735 ASSAY OF MAGNESIUM: CPT

## 2019-11-26 PROCEDURE — 80048 BASIC METABOLIC PNL TOTAL CA: CPT

## 2019-11-26 PROCEDURE — 82803 BLOOD GASES ANY COMBINATION: CPT

## 2019-11-26 PROCEDURE — 36415 COLL VENOUS BLD VENIPUNCTURE: CPT

## 2019-11-26 PROCEDURE — 83605 ASSAY OF LACTIC ACID: CPT

## 2019-11-26 RX ORDER — DOBUTAMINE HYDROCHLORIDE 400 MG/100ML
2.5 INJECTION, SOLUTION INTRAVENOUS CONTINUOUS
Status: DISCONTINUED | OUTPATIENT
Start: 2019-11-26 | End: 2019-12-01

## 2019-11-26 RX ORDER — FUROSEMIDE 10 MG/ML
80 INJECTION INTRAMUSCULAR; INTRAVENOUS ONCE
Status: DISCONTINUED | OUTPATIENT
Start: 2019-11-26 | End: 2019-11-27

## 2019-11-26 RX ADMIN — WARFARIN SODIUM 8 MG: 3 TABLET ORAL at 05:11

## 2019-11-26 RX ADMIN — FERROUS SULFATE TAB EC 325 MG (65 MG FE EQUIVALENT) 325 MG: 325 (65 FE) TABLET DELAYED RESPONSE at 09:11

## 2019-11-26 RX ADMIN — FUROSEMIDE 120 MG: 10 INJECTION, SOLUTION INTRAMUSCULAR; INTRAVENOUS at 03:11

## 2019-11-26 RX ADMIN — HYDRALAZINE HYDROCHLORIDE AND ISOSORBIDE DINITRATE 1 TABLET: 37.5; 2 TABLET, FILM COATED ORAL at 09:11

## 2019-11-26 RX ADMIN — METOPROLOL SUCCINATE 50 MG: 50 TABLET, EXTENDED RELEASE ORAL at 09:11

## 2019-11-26 RX ADMIN — CHLOROTHIAZIDE SODIUM 500 MG: 500 INJECTION, POWDER, LYOPHILIZED, FOR SOLUTION INTRAVENOUS at 08:11

## 2019-11-26 RX ADMIN — GABAPENTIN 100 MG: 250 SOLUTION ORAL at 08:11

## 2019-11-26 RX ADMIN — GABAPENTIN 100 MG: 250 SOLUTION ORAL at 09:11

## 2019-11-26 RX ADMIN — FUROSEMIDE 120 MG: 10 INJECTION, SOLUTION INTRAMUSCULAR; INTRAVENOUS at 05:11

## 2019-11-26 RX ADMIN — SENNOSIDES AND DOCUSATE SODIUM 1 TABLET: 8.6; 5 TABLET ORAL at 08:11

## 2019-11-26 RX ADMIN — FUROSEMIDE 10 MG/HR: 10 INJECTION, SOLUTION INTRAMUSCULAR; INTRAVENOUS at 08:11

## 2019-11-26 RX ADMIN — MIRTAZAPINE 7.5 MG: 7.5 TABLET ORAL at 08:11

## 2019-11-26 RX ADMIN — PANTOPRAZOLE SODIUM 40 MG: 40 TABLET, DELAYED RELEASE ORAL at 09:11

## 2019-11-26 RX ADMIN — ASPIRIN 81 MG: 81 TABLET, COATED ORAL at 09:11

## 2019-11-26 RX ADMIN — DOBUTAMINE IN DEXTROSE 2.5 MCG/KG/MIN: 200 INJECTION, SOLUTION INTRAVENOUS at 06:11

## 2019-11-26 RX ADMIN — HYDRALAZINE HYDROCHLORIDE AND ISOSORBIDE DINITRATE 1 TABLET: 37.5; 2 TABLET, FILM COATED ORAL at 08:11

## 2019-11-26 NOTE — ASSESSMENT & PLAN NOTE
54-year-old male with HFrEF (last 2D ECHO EF 23%, moderate MR, moderate TR, diastolic dysfunction).  hypertension, chronic atrial fibrillation, a flutter status post ablation, CVA in 2009, coronary artery disease, CKD III, and obstructive sleep apnea presents with ADCHF. Nephro consulted for JEAN-PIERRE on CKD.     Was evaluated by Nephrology in 7/2019 for JEAN-PIERRE during ADCHF admission. At the time JEAN-PIERRE was thought to be to be multifactorial from labile BP, nephrotoxic meds, & component of cardiorenal. CXR with oval opacity thought to be loculated fluid at site of right lung fissure. Given IV lasix during hospital admission. UOP declined today. Patient still complaining of SOB. On 1.5L at home. UA unremarkable. Last TTE on 4/2019 with EF 23%, PAP 47 and diastolic dysfunction.       Patient reported apprehension about blood transfusion as he thinks he may contract AIDs. Advised that his Hb 6.2 is suboptimal for cardiac function and blood is rigorously screened.     Recommendations:   -F/u ABG & CXR   -will f/u with urine sediment   -Trial of lasix 160mg IV once today with Diuril 500mg IV   -Obtain Urine lytes- Na, Cr, Urea, UPC, protein  - Strict Is and Os and daily weights  - Avoid nephrotoxic medications

## 2019-11-26 NOTE — HPI
Mr. Terrell is a 54yoM, Cardiology consulted for Cardiogenic shock evaluate for inotropic support. PMhx of HFrEF (EF 23%, moderate MR, moderate TR, diastolic dysfunction), with pulm HTN (PASP 47mmHg< hypertension, Permanent AF/AFL s/p CTI with CVA in 2009 on Coumadin, CAD and obstructive sleep apnea who presented 11/24/19 with 1 week history of worsening SOB / MUÑOZ and 30 lb weight gain. Patient states compliance with fluid, salt and medication,  However since last week having worsening LE edema with fluid weeping of his legs. Associated with MUÑOZ, Fatigue. He does have chronic severe anemia (refusing transfusion) with Hbg 6, additionally CXR demonstrating worsening RLL consolidation vs. Effusion. Case discussed with cardiology in ED, given Lasix IVP with diuril with minimal UOP, today patient having AMS / lethargy, worsening renal function with elevated BNP, Tbili trending up, with soft BP SBP 90s, requiring supplemental oxygen. Cardiology consulted for Inotropic support, on telemetry patient having NSVT with permanent Afib.    Exam: +3 LE thigh high with JVD+HRS with engorge head veins, patient unalbe to lay flat. Lung exam difficult with body habitus. Distal extremities warm. He is on Toprol Xl 50mg qday with Bidil.     ECHO reviewed:  · Severely decreased left ventricular systolic function. The estimated ejection fraction is 23%  · Eccentric left ventricular hypertrophy.  · Left ventricular diastolic dysfunction.  · Septal wall has abnormal motion.  · Local segmental wall motion abnormalities.  · Severe left atrial enlargement.  · Moderate right ventricular enlargement.  · Mildly reduced right ventricular systolic function.  · Severe right atrial enlargement.  · Moderate mitral regurgitation.  · Moderate tricuspid regurgitation.  · Elevated central venous pressure (15 mm Hg).  · The estimated PA systolic pressure is 47 mm Hg  · Pulmonary hypertension present.  · Trivial Pericardial effusion.

## 2019-11-26 NOTE — PROGRESS NOTES
MD Toledo made aware tele monitor noting Vtach. Pt asymptomatic, no c/o chest pain or SOB noted. Pt lying comfortably in bed. Will continue to monitor pt.

## 2019-11-26 NOTE — PROGRESS NOTES
Patient now states he will take the blood transfusion. Calling Dr. Howard.  Will continue to monitor.

## 2019-11-26 NOTE — CONSULTS
[facility]  Cardiology  Consult Note    Patient Name: Hamlet Terrell  MRN: [id number]  Admission Date: 11/24/2019  Hospital Length of Stay: 2 days  Code Status: Full Code   Attending Provider: Markus Howard MD   Consulting Provider: Amara Harman MD  Primary Care Physician: Carlin Swift MD  Principal Problem:Acute on chronic combined systolic and diastolic heart failure    Patient information was obtained from patient and ER records.     Inpatient consult to Cardiology  Consult performed by: Amara Riggs MD  Consult ordered by: Markus Howard MD        Subjective:     Chief Complaint:  Heart failure     HPI:   Mr. Terrell is a 54yoM, Cardiology consulted for Cardiogenic shock evaluate for inotropic support. PMhx of HFrEF (EF 23%, moderate MR, moderate TR, diastolic dysfunction), with pulm HTN (PASP 47mmHg< hypertension, Permanent AF/AFL s/p CTI with CVA in 2009 on Coumadin, CAD and obstructive sleep apnea who presented 11/24/19 with 1 week history of worsening SOB / MUÑOZ and 30 lb weight gain. Patient states compliance with fluid, salt and medication,  However since last week having worsening LE edema with fluid weeping of his legs. Associated with MUÑOZ, Fatigue. He does have chronic severe anemia (refusing transfusion) with Hbg 6, additionally CXR demonstrating worsening RLL consolidation vs. Effusion. Case discussed with cardiology in ED, given Lasix IVP with diuril with minimal UOP, today patient having AMS / lethargy, worsening renal function with elevated BNP, Tbili trending up, with soft BP SBP 90s, requiring supplemental oxygen. Cardiology consulted for Inotropic support, on telemetry patient having NSVT with permanent Afib.    Exam: +3 LE thigh high with JVD+HRS with engorge head veins, patient unalbe to lay flat. Lung exam difficult with body habitus. Distal extremities warm. He is on Toprol Xl 50mg qday with Bidil.     ECHO reviewed:  · Severely decreased left  ventricular systolic function. The estimated ejection fraction is 23%  · Eccentric left ventricular hypertrophy.  · Left ventricular diastolic dysfunction.  · Septal wall has abnormal motion.  · Local segmental wall motion abnormalities.  · Severe left atrial enlargement.  · Moderate right ventricular enlargement.  · Mildly reduced right ventricular systolic function.  · Severe right atrial enlargement.  · Moderate mitral regurgitation.  · Moderate tricuspid regurgitation.  · Elevated central venous pressure (15 mm Hg).  · The estimated PA systolic pressure is 47 mm Hg  · Pulmonary hypertension present.  · Trivial Pericardial effusion.       Past Medical History:   Diagnosis Date    Anticoagulant long-term use     Cardiomegaly     Chronic combined systolic and diastolic congestive heart failure     Coronary artery disease     Heart attack 2006    Hypertension     Hyperthyroidism, subclinical 1/2/2013    MI (myocardial infarction) 9/22/2013    MI in 2009      Paroxysmal atrial fibrillation     PE (pulmonary embolism) 1/1/2013    IN 2010     S/P ablation of atrial flutter 2008    Stroke 2009    no residual weaknesses       Past Surgical History:   Procedure Laterality Date    RADIOFREQUENCY ABLATION  01/08/2008    for atrial flutter       Review of patient's allergies indicates:   Allergen Reactions    Acetaminophen      Itching    Oxycodone-acetaminophen      Other reaction(s): Itching    Ace inhibitors Other (See Comments)     cough       No current facility-administered medications on file prior to encounter.      Current Outpatient Medications on File Prior to Encounter   Medication Sig    albuterol (PROVENTIL/VENTOLIN HFA) 90 mcg/actuation inhaler Inhale 1-2 puffs into the lungs every 6 (six) hours as needed for Wheezing. Rescue    aspirin (ECOTRIN) 81 MG EC tablet Take 81 mg by mouth once daily.    calcium carbonate (TUMS) 200 mg calcium (500 mg) chewable tablet Take 1 tablet (500 mg total) by  mouth 3 (three) times daily as needed.    ferrous sulfate 325 (65 FE) MG EC tablet Take 1 tablet (325 mg total) by mouth once daily.    furosemide (LASIX) 80 MG tablet Take 1 tablet (80 mg total) by mouth 2 (two) times daily.    gabapentin (NEURONTIN) 250 mg/5 mL (5 mL) solution Take 2 mLs (100 mg total) by mouth 2 (two) times daily.    isosorbide-hydrALAZINE 20-37.5 mg (BIDIL) 20-37.5 mg Tab Take 1 tablet by mouth 2 (two) times daily.    methyl salicylate-menthol 15-10% 15-10 % Crea Apply topically 3 (three) times daily.    metoprolol tartrate (LOPRESSOR) 25 MG tablet Take 1 tablet (25 mg total) by mouth every 6 (six) hours.    nitroGLYCERIN (NITROSTAT) 0.4 MG SL tablet Place 1 tablet (0.4 mg total) under the tongue every 5 (five) minutes as needed for Chest pain. Tablet, Sublingual Sublingual    pantoprazole (PROTONIX) 40 MG tablet Take 1 tablet (40 mg total) by mouth once daily.    polyethylene glycol (GLYCOLAX) 17 gram PwPk Take 17 g by mouth 2 (two) times daily.    ramelteon (ROZEREM) 8 mg tablet Take 1 tablet (8 mg total) by mouth nightly as needed for Insomnia.    senna-docusate 8.6-50 mg (PERICOLACE) 8.6-50 mg per tablet Take 1 tablet by mouth 2 (two) times daily.    warfarin (COUMADIN) 5 MG tablet Take 1 tablet (5 mg total) by mouth Daily. Take as directed by coumadin clinic     Family History     Problem Relation (Age of Onset)    Alcohol abuse Father    Hypertension Mother, Father, Sister, Brother    Stroke Mother        Tobacco Use    Smoking status: Never Smoker    Smokeless tobacco: Never Used   Substance and Sexual Activity    Alcohol use: No    Drug use: No    Sexual activity: Never     Review of Systems   Constitution: Positive for malaise/fatigue and weight gain. Negative for chills, decreased appetite, diaphoresis, fever and weight loss.   Eyes: Negative for blurred vision.   Cardiovascular: Positive for dyspnea on exertion and leg swelling. Negative for chest pain, irregular  heartbeat, near-syncope, orthopnea and palpitations.   Respiratory: Positive for shortness of breath. Negative for cough, snoring and wheezing.    Gastrointestinal: Negative for abdominal pain, nausea and vomiting.   Genitourinary: Negative for bladder incontinence and urgency.     Objective:     Vital Signs (Most Recent):  Temp: 97.8 °F (36.6 °C) (11/26/19 1653)  Pulse: 76 (11/26/19 1653)  Resp: 20 (11/26/19 1653)  BP: (!) 124/59 (11/26/19 1653)  SpO2: (!) 94 % (11/26/19 1653) Vital Signs (24h Range):  Temp:  [97 °F (36.1 °C)-98.2 °F (36.8 °C)] 97.8 °F (36.6 °C)  Pulse:  [59-97] 76  Resp:  [15-20] 20  SpO2:  [94 %-98 %] 94 %  BP: (100-141)/(54-68) 124/59     Weight: 127.2 kg (280 lb 6.8 oz)  Body mass index is 41.41 kg/m².    SpO2: (!) 94 %  O2 Device (Oxygen Therapy): nasal cannula      Intake/Output Summary (Last 24 hours) at 11/26/2019 1745  Last data filed at 11/26/2019 1354  Gross per 24 hour   Intake 820 ml   Output 150 ml   Net 670 ml       Lines/Drains/Airways     Peripheral Intravenous Line                 Peripheral IV - Single Lumen 11/24/19 1355 22 G Right Hand 2 days                Physical Exam    Significant Labs:   BMP:   Recent Labs   Lab 11/25/19  0739 11/26/19 0312   GLU 96 76    138   K 4.7 5.1    99   CO2 29 28   BUN 58* 64*   CREATININE 2.3* 3.2*   CALCIUM 9.6 9.3   MG 2.5 2.5   , CMP   Recent Labs   Lab 11/25/19  0739 11/26/19 0312    138   K 4.7 5.1    99   CO2 29 28   GLU 96 76   BUN 58* 64*   CREATININE 2.3* 3.2*   CALCIUM 9.6 9.3   ANIONGAP 10 11   ESTGFRAFRICA 36.1* 24.2*   EGFRNONAA 31.3* 21.0*   , CBC   Recent Labs   Lab 11/25/19  0739 11/26/19 0312   WBC 5.68 6.44   HGB 6.2* 6.1*   HCT 22.7* 22.7*    269   , INR   Recent Labs   Lab 11/25/19  0739 11/26/19 0312   INR 1.9* 2.0*    and Lipid Panel No results for input(s): CHOL, HDL, LDLCALC, TRIG, CHOLHDL in the last 48 hours.    Significant Imaging: Echocardiogram:   Transthoracic echo (TTE) complete  (Cupid Only):   Results for orders placed or performed during the hospital encounter of 04/15/19   Transthoracic echo (TTE) complete (Cupid Only)   Result Value Ref Range    Ascending aorta 3.68 cm    STJ 3.01 cm    AV mean gradient 3.07 mmHg    Ao peak karri 1.15 m/s    Ao VTI 18.57 cm    IVRT 0.09 msec    IVS 1.21 (A) 0.6 - 1.1 cm    LA size 6.18 cm    Left Atrium Major Axis 7.29 cm    Left Atrium Minor Axis 7.51 cm    LVIDD 5.88 3.5 - 6.0 cm    LVIDS 5.12 (A) 2.1 - 4.0 cm    LVOT diameter 2.52 cm    LVOT peak VTI 9.64 cm    PW 1.21 (A) 0.6 - 1.1 cm    MV Peak E Karri 1.16 m/s    RA Major Axis 7.04 cm    RA Width 4.87 cm    RVDD 5.78 cm    Sinus 3.36 cm    TAPSE 1.56 cm    TR Max Karri 2.85 m/s    TDI LATERAL 0.06     TDI SEPTAL 0.04     LA WIDTH 5.16 cm    LV Diastolic Volume 172.13 mL    LV Systolic Volume 124.91 mL    RV S' 5.90 m/s    LVOT peak karri [id number] m/s    LV LATERAL E/E' RATIO 19.33     LV SEPTAL E/E' RATIO 29.00     FS 13 %    LA volume 200.54 cm3    LV mass 307.18 g    Left Ventricle Relative Wall Thickness 0.41 cm    AV valve area 2.59 cm2    AV Velocity Ratio 0.57     AV index (prosthetic) 0.52     Mean e' 0.05     LVOT area 4.99 cm2    LVOT stroke volume 48.06 cm3    AV peak gradient 5.29 mmHg    E/E' ratio 23.20     LV Systolic Volume Index 53.1 mL/m2    LV Diastolic Volume Index 73.24 mL/m2    LA Volume Index 85.3 mL/m2    LV Mass Index 130.7 g/m2    Triscuspid Valve Regurgitation Peak Gradient 32.49 mmHg    BSA 2.45 m2    Right Atrial Pressure (from IVC) 15 mmHg    TV rest pulmonary artery pressure 47 mmHg    Narrative    · Severely decreased left ventricular systolic function. The estimated   ejection fraction is 23%  · Eccentric left ventricular hypertrophy.  · Left ventricular diastolic dysfunction.  · Septal wall has abnormal motion.  · Local segmental wall motion abnormalities.  · Severe left atrial enlargement.  · Moderate right ventricular enlargement.  · Mildly reduced right  ventricular systolic function.  · Severe right atrial enlargement.  · Moderate mitral regurgitation.  · Moderate tricuspid regurgitation.  · Elevated central venous pressure (15 mm Hg).  · The estimated PA systolic pressure is 47 mm Hg  · Pulmonary hypertension present.  · Trivial Pericardial effusion.        Assessment and Plan:     * Acute on chronic combined systolic and diastolic heart failure  Mr. Terrell is a 54yoM, Cardiology consulted for Cardiogenic shock evaluate for inotropic support. Patient with NICM EF 23% with mild RV failure, having difficulty diuresis with IV Lasix. Unfortunately worsening renal function / hepatic function and now with encephalopathy with soft blood pressure (sBP 90/50) concern for cardiogenic shock, on exam he is volume overloaded and warm to touch. Patient does have history of permanent Afib with NSVT, he may not tolerate  2.5mcg/kg/min, that being said he would benefit with inotropic support.    -  (obese patient, false negative)     Cardiogenic shock with end organ failure:   ADHF with NICM (negative PET)  Acute on chronic respiratory failure   JEAN-PIERRE on CKD3  Acute liver injury   Atrial fibrillation, chronic  NSVT     - Discussed with Dr. Howard, Start low dose  2.5mcg/kg/min  - Low threshold for CCU upgrade, if not diuresing or end organ function not improving would recommend Central access for hemodynamic monitoring  - Patient aware risk for RVR and/or VT, he is not protected without ICD, additionally having NSVT, will continue to closely monitor  - lasix ggt 10mg/hr titrate up, goal UOP >1cc/kg/min   - Consider Diuril   - hold BB with ongoing shock  - Consider CT chest to further evaluation RLL opacity   - Recommend keep Hbg >8, consider heme/onc consultation   - Fluid restriction, Diet restriction   - Aggressive PT / OT up in chair                 VTE Risk Mitigation (From admission, onward)         Ordered     warfarin tablet 8 mg  Daily      11/25/19 4660      Reason for No Pharmacological VTE Prophylaxis  Once     Question:  Reasons:  Answer:  Already adequately anticoagulated on oral Anticoagulants    11/24/19 1650     IP VTE HIGH RISK PATIENT  Once      11/24/19 1650     Place sequential compression device  Until discontinued      11/24/19 1650     Place CAMI hose  Until discontinued      11/24/19 1650                Thank you for your consult. I will follow-up with patient. Please contact us if you have any additional questions.    Amara Harman MD  Cardiology   [facility]

## 2019-11-26 NOTE — PLAN OF CARE
CTPB regarding patient lethargy. Patient sleepy but easily arouses to voice and is oriented x 4. He has 2+ pulses noted throughout and is warm and perfusing. Neurologic exam, though limited, is negative. CXR, ABG, and lactic acid are ordered. Await response from nephrology. I am having cardiology co-management assess for need for inotropic support, especially in light of self-limited NSVT on tele. Per cardiology, start on low dose 2.5 mcg/kg/min  at this time. Will f/u further recommendations. Will transfer patient to CSU in interim with low threshold to transfer to CCU.  Please see PN of day for full details.    Markus Howard MD  Department of Hospital Medicine

## 2019-11-26 NOTE — ASSESSMENT & PLAN NOTE
Mr. Terrell is a 54yoM, Cardiology consulted for Cardiogenic shock evaluate for inotropic support. Patient with NICM EF 23% with mild RV failure, having difficulty diuresis with IV Lasix. Unfortunately worsening renal function / hepatic function and now with encephalopathy with soft blood pressure (sBP 90/50) concern for cardiogenic shock, on exam he is volume overloaded and warm to touch. Patient does have history of permanent Afib with NSVT, he may not tolerate  2.5mcg/kg/min, that being said he would benefit with inotropic support.    -  (obese patient, false negative)     Cardiogenic shock with end organ failure:   ADHF with NICM (negative PET)  Acute on chronic respiratory failure   JEAN-PIERRE on CKD3  Acute liver injury   Atrial fibrillation, chronic  NSVT     - Discussed with Dr. Howard, Start low dose  2.5mcg/kg/min  - Low threshold for CCU upgrade, if not diuresing or end organ function not improving would recommend Central access for hemodynamic monitoring  - Patient aware risk for RVR and/or VT, he is not protected without ICD, additionally having NSVT, will continue to closely monitor  - lasix ggt 10mg/hr titrate up, goal UOP >1cc/kg/min   - Consider Diuril   - hold BB with ongoing shock  - Consider CT chest to further evaluation RLL opacity   - Recommend keep Hbg >8, consider heme/onc consultation   - Fluid restriction, Diet restriction   - Aggressive PT / OT up in chair

## 2019-11-26 NOTE — PROGRESS NOTES
[facility] Hospital Medicine  Progress Note    Primary Team: [facility]  Admit Date: 11/24/2019    Subjective:      HPI:   Mr. Terrell is a 54yoM, Cardiology consulted for Cardiogenic shock evaluate for inotropic support. PMHx of HFrEF (EF 23%, moderate MR, moderate TR, diastolic dysfunction), with pulm HTN (PASP 47 mm Hg), Permanent AF/AFL s/p CTI with CVA in 2009 on Coumadin, CAD and obstructive sleep apnea who presented 11/24/19 with 1 week history of worsening SOB / MUÑOZ and 30 lb weight gain. Patient states compliance with fluid, salt and medication,  However since last week having worsening LE edema with fluid weeping of his legs. Associated with MUÑOZ, Fatigue. He does have chronic severe anemia (refusing transfusion) with Hbg 6, additionally CXR demonstrating worsening RLL consolidation vs. Effusion. Case discussed with cardiology in ED, given Lasix IVP with diuril with minimal UOP, today patient having AMS / lethargy, worsening renal function with elevated BNP, Tbili trending up, with soft BP SBP 90s, requiring supplemental oxygen. Cardiology consulted for Inotropic support, on telemetry patient having NSVT with permanent Afib.He also has severe anemia with a hemoglobin of 6.2 which is consistent with his hemoglobin obtained on last admit on 10/23/2019.  The patient refuses blood products.  Chest x-ray was done which showed increase lobulated oval opacity in the right mid lung field which is a new finding compared to chest x-ray done on 10/23/2019.  BNP was done and was significant for a value of 210.  The patient was initially given a mg of Lasix IV with minimal response and this was followed by 120 mg IV per Cardiology.    ECHO 4/16/2019  · Severely decreased left ventricular systolic function.   · The estimated ejection fraction is 23%  · Eccentric left ventricular hypertrophy.  · Left ventricular diastolic dysfunction.  · Septal wall has abnormal motion.  · Local segmental wall  motion abnormalities.  · Severe left atrial enlargement.  · Moderate right ventricular enlargement.  · Mildly reduced right ventricular systolic function.  · Severe right atrial enlargement.  · Moderate mitral regurgitation.  · Moderate tricuspid regurgitation.  · Elevated central venous pressure (15 mm Hg).  · The estimated PA systolic pressure is 47 mm Hg  · Pulmonary hypertension present.  · Trivial Pericardial effusion.    Interval History:  Admitted  for ADHF on IV Lasix. Weight increasing with UOP minimal and Cr worsening. Patient seen by nephrology and cardiology for assistance. Persistent symptoms as above, with additional lethargy without change in mental status.    ROS:  As per HPI  General: no fever, no chills, no weight loss, no fatigue  Cardiovascular: no chest pain  Respiratory: no cough, no wheezes  All other systems reviewed & are negative.     Past Medical History:   Diagnosis Date    Anticoagulant long-term use     Cardiomegaly     Chronic combined systolic and diastolic congestive heart failure     Coronary artery disease     Heart attack 2006    Hypertension     Hyperthyroidism, subclinical 1/2/2013    MI (myocardial infarction) 9/22/2013    MI in 2009      Paroxysmal atrial fibrillation     PE (pulmonary embolism) 1/1/2013    IN 2010     S/P ablation of atrial flutter 2008    Stroke 2009    no residual weaknesses     Past Surgical History:   Procedure Laterality Date    RADIOFREQUENCY ABLATION  01/08/2008    for atrial flutter     Social History     Socioeconomic History    Marital status: Single     Spouse name: Not on file    Number of children: Not on file    Years of education: Not on file    Highest education level: Not on file   Occupational History    Not on file   Social Needs    Financial resource strain: Not on file    Food insecurity:     Worry: Not on file     Inability: Not on file    Transportation needs:     Medical: Not on file     Non-medical: Not on file    Tobacco Use    Smoking status: Never Smoker    Smokeless tobacco: Never Used   Substance and Sexual Activity    Alcohol use: No    Drug use: No    Sexual activity: Never   Lifestyle    Physical activity:     Days per week: Not on file     Minutes per session: Not on file    Stress: Not on file   Relationships    Social connections:     Talks on phone: Not on file     Gets together: Not on file     Attends Zoroastrian service: Not on file     Active member of club or organization: Not on file     Attends meetings of clubs or organizations: Not on file     Relationship status: Not on file   Other Topics Concern    Not on file   Social History Narrative    Not on file         Objective:   Last 24 Hour Vital Signs:  BP  Min: 106/63  Max: 116/59  Temp  Av.6 °F (36.4 °C)  Min: 97 °F (36.1 °C)  Max: 97.9 °F (36.6 °C)  Pulse  Av.8  Min: 59  Max: 105  Resp  Av.5  Min: 15  Max: 20  SpO2  Av.2 %  Min: 94 %  Max: 100 %  Weight  Av.2 kg (280 lb 6.8 oz)  Min: 127.2 kg (280 lb 6.8 oz)  Max: 127.2 kg (280 lb 6.8 oz)  I/O last 3 completed shifts:  In: 1130 [P.O.:1130]  Out: 2185 [Urine:2185]    Physical Examination:  GEN: AAOx3, NAD, appears mildly short of breath  HEENT: NCAT, MMM, PERRL, EOMI, oropharynx clear +JVD with HRS with engorgement head veins  CV: RRR, no m/r, no S3/S4, warm, well perfused  RESP: CTAB, no wheezes/crackles, no increased WOB  ABD: soft, NTND, normoactive BS, no organomegaly  EXTR: no c/c, intact distal pulses x 4, BLE edema  NEURO: PERRL, EOMI, moving all four extremities, intact sensation to light touch, no focal deficits  SKIN: no rashes, lesions, or color changes  PSYCH: normal affect    Laboratory:  I have reviewed all pertinent lab results/findings within the past 24 hours.    Radiology:  I have reviewed all pertinent imaging results/findings within the past 24 hours.    Current Medications:     Infusions:       Scheduled:   aspirin  81 mg Oral Daily    ferrous  sulfate  325 mg Oral Daily    furosemide  120 mg Intravenous BID    gabapentin  100 mg Oral Q12H    isosorbide-hydrALAZINE 20-37.5 mg  1 tablet Oral TID    metoprolol succinate  50 mg Oral Daily    mirtazapine  7.5 mg Oral QHS    pantoprazole  40 mg Oral Daily    polyethylene glycol  17 g Oral BID    senna-docusate 8.6-50 mg  1 tablet Oral BID    warfarin  8 mg Oral Daily        PRN:  calcium carbonate, diphenhydrAMINE, melatonin, methyl salicylate-menthol 15-10%, sodium chloride 0.9%, traMADol    Prior records reviewed.    Assessment/Plan:     Hamlet Terrell is a 54 y.o.male with    Acute on chronic combined systolic and diastolic heart failure  Initial poor response to 80 mg of Lasix and patient given Lasix 120 mg IV and Diuril 500 mg IV on admission per Cardiology.  EF 23%.  Add inotropic support with  2.5 mcg/kg/min  Monitor closely for arrhythmia with repletion K and Mg to 4.0/2.0 respectively  Start lasix gtt after bolus  Add diuril per nephrology  Continue Bidil as tolerated  Hold lopressor    Abnormal CT  Abnormal opacity in middle lobe of right lung  Non-smoker  Obtain CT of the chest when patient is more euvolemic     JEAN-PIERRE on CKD3  Cr 1.9 > 2.3 > 3.2 (baseline 1.6-1.7)  Monitor on diuresis  Continue to monitor electrolytes   Nephrology consulted, appreciate recommendations    Essential hypertension  Adjust antiHTNsives as above     Atrial fibrillation, chronic  Chronic anticoagulation  INR 1.9, subtherapeutic  Received lower dose of coumadin yesterday than his normal home dosing. Will resume home dosing.   Daily INR's  HR controlled. Continue metoprolol     Iron deficiency anemia  Hb stable at 6.2 and at baseline. No bleeding. Repeat ferritin level. May consider iron infusion since patient refuses blood products  Consult to heme for further management; multiple anemia panel labs ordered for f/u  I counseled patient for about 10 minutes on benefits and risks of transfusion and he, after this  "discussion, still needs to "think about it"     History of CVA (cerebrovascular accident)  No acute management     Chronic respiratory failure  1.5 L NC at home  Reports malfunctioning O2 tank at home - consult CM     DVT PPx: coumadin  Diet: Low sodium with 1200 cc fluid restriction  FULL CODE    Markus Howard MD  Hospital Medicine Staff  [facility]      "

## 2019-11-26 NOTE — SUBJECTIVE & OBJECTIVE
Past Medical History:   Diagnosis Date    Anticoagulant long-term use     Cardiomegaly     Chronic combined systolic and diastolic congestive heart failure     Coronary artery disease     Heart attack 2006    Hypertension     Hyperthyroidism, subclinical 1/2/2013    MI (myocardial infarction) 9/22/2013    MI in 2009      Paroxysmal atrial fibrillation     PE (pulmonary embolism) 1/1/2013    IN 2010     S/P ablation of atrial flutter 2008    Stroke 2009    no residual weaknesses       Past Surgical History:   Procedure Laterality Date    RADIOFREQUENCY ABLATION  01/08/2008    for atrial flutter       Review of patient's allergies indicates:   Allergen Reactions    Acetaminophen      Itching    Oxycodone-acetaminophen      Other reaction(s): Itching    Ace inhibitors Other (See Comments)     cough       No current facility-administered medications on file prior to encounter.      Current Outpatient Medications on File Prior to Encounter   Medication Sig    albuterol (PROVENTIL/VENTOLIN HFA) 90 mcg/actuation inhaler Inhale 1-2 puffs into the lungs every 6 (six) hours as needed for Wheezing. Rescue    aspirin (ECOTRIN) 81 MG EC tablet Take 81 mg by mouth once daily.    calcium carbonate (TUMS) 200 mg calcium (500 mg) chewable tablet Take 1 tablet (500 mg total) by mouth 3 (three) times daily as needed.    ferrous sulfate 325 (65 FE) MG EC tablet Take 1 tablet (325 mg total) by mouth once daily.    furosemide (LASIX) 80 MG tablet Take 1 tablet (80 mg total) by mouth 2 (two) times daily.    gabapentin (NEURONTIN) 250 mg/5 mL (5 mL) solution Take 2 mLs (100 mg total) by mouth 2 (two) times daily.    isosorbide-hydrALAZINE 20-37.5 mg (BIDIL) 20-37.5 mg Tab Take 1 tablet by mouth 2 (two) times daily.    methyl salicylate-menthol 15-10% 15-10 % Crea Apply topically 3 (three) times daily.    metoprolol tartrate (LOPRESSOR) 25 MG tablet Take 1 tablet (25 mg total) by mouth every 6 (six) hours.     nitroGLYCERIN (NITROSTAT) 0.4 MG SL tablet Place 1 tablet (0.4 mg total) under the tongue every 5 (five) minutes as needed for Chest pain. Tablet, Sublingual Sublingual    pantoprazole (PROTONIX) 40 MG tablet Take 1 tablet (40 mg total) by mouth once daily.    polyethylene glycol (GLYCOLAX) 17 gram PwPk Take 17 g by mouth 2 (two) times daily.    ramelteon (ROZEREM) 8 mg tablet Take 1 tablet (8 mg total) by mouth nightly as needed for Insomnia.    senna-docusate 8.6-50 mg (PERICOLACE) 8.6-50 mg per tablet Take 1 tablet by mouth 2 (two) times daily.    warfarin (COUMADIN) 5 MG tablet Take 1 tablet (5 mg total) by mouth Daily. Take as directed by coumadin clinic     Family History     Problem Relation (Age of Onset)    Alcohol abuse Father    Hypertension Mother, Father, Sister, Brother    Stroke Mother        Tobacco Use    Smoking status: Never Smoker    Smokeless tobacco: Never Used   Substance and Sexual Activity    Alcohol use: No    Drug use: No    Sexual activity: Never     Review of Systems   Constitution: Positive for malaise/fatigue and weight gain. Negative for chills, decreased appetite, diaphoresis, fever and weight loss.   Eyes: Negative for blurred vision.   Cardiovascular: Positive for dyspnea on exertion and leg swelling. Negative for chest pain, irregular heartbeat, near-syncope, orthopnea and palpitations.   Respiratory: Positive for shortness of breath. Negative for cough, snoring and wheezing.    Gastrointestinal: Negative for abdominal pain, nausea and vomiting.   Genitourinary: Negative for bladder incontinence and urgency.     Objective:     Vital Signs (Most Recent):  Temp: 97.8 °F (36.6 °C) (11/26/19 1653)  Pulse: 76 (11/26/19 1653)  Resp: 20 (11/26/19 1653)  BP: (!) 124/59 (11/26/19 1653)  SpO2: (!) 94 % (11/26/19 1653) Vital Signs (24h Range):  Temp:  [97 °F (36.1 °C)-98.2 °F (36.8 °C)] 97.8 °F (36.6 °C)  Pulse:  [59-97] 76  Resp:  [15-20] 20  SpO2:  [94 %-98 %] 94 %  BP:  (100-141)/(54-68) 124/59     Weight: 127.2 kg (280 lb 6.8 oz)  Body mass index is 41.41 kg/m².    SpO2: (!) 94 %  O2 Device (Oxygen Therapy): nasal cannula      Intake/Output Summary (Last 24 hours) at 11/26/2019 1745  Last data filed at 11/26/2019 1354  Gross per 24 hour   Intake 820 ml   Output 150 ml   Net 670 ml       Lines/Drains/Airways     Peripheral Intravenous Line                 Peripheral IV - Single Lumen 11/24/19 1355 22 G Right Hand 2 days                Physical Exam    Significant Labs:   BMP:   Recent Labs   Lab 11/25/19  0739 11/26/19 0312   GLU 96 76    138   K 4.7 5.1    99   CO2 29 28   BUN 58* 64*   CREATININE 2.3* 3.2*   CALCIUM 9.6 9.3   MG 2.5 2.5   , CMP   Recent Labs   Lab 11/25/19  0739 11/26/19 0312    138   K 4.7 5.1    99   CO2 29 28   GLU 96 76   BUN 58* 64*   CREATININE 2.3* 3.2*   CALCIUM 9.6 9.3   ANIONGAP 10 11   ESTGFRAFRICA 36.1* 24.2*   EGFRNONAA 31.3* 21.0*   , CBC   Recent Labs   Lab 11/25/19  0739 11/26/19 0312   WBC 5.68 6.44   HGB 6.2* 6.1*   HCT 22.7* 22.7*    269   , INR   Recent Labs   Lab 11/25/19  0739 11/26/19 0312   INR 1.9* 2.0*    and Lipid Panel No results for input(s): CHOL, HDL, LDLCALC, TRIG, CHOLHDL in the last 48 hours.    Significant Imaging: Echocardiogram:   Transthoracic echo (TTE) complete (Cupid Only):   Results for orders placed or performed during the hospital encounter of 04/15/19   Transthoracic echo (TTE) complete (Cupid Only)   Result Value Ref Range    Ascending aorta 3.68 cm    STJ 3.01 cm    AV mean gradient 3.07 mmHg    Ao peak karri 1.15 m/s    Ao VTI 18.57 cm    IVRT 0.09 msec    IVS 1.21 (A) 0.6 - 1.1 cm    LA size 6.18 cm    Left Atrium Major Axis 7.29 cm    Left Atrium Minor Axis 7.51 cm    LVIDD 5.88 3.5 - 6.0 cm    LVIDS 5.12 (A) 2.1 - 4.0 cm    LVOT diameter 2.52 cm    LVOT peak VTI 9.64 cm    PW 1.21 (A) 0.6 - 1.1 cm    MV Peak E Karri 1.16 m/s    RA Major Axis 7.04 cm    RA Width 4.87 cm    RVDD 5.78  cm    Sinus 3.36 cm    TAPSE 1.56 cm    TR Max Karri 2.85 m/s    TDI LATERAL 0.06     TDI SEPTAL 0.04     LA WIDTH 5.16 cm    LV Diastolic Volume 172.13 mL    LV Systolic Volume 124.91 mL    RV S' 5.90 m/s    LVOT peak karri [id number] m/s    LV LATERAL E/E' RATIO 19.33     LV SEPTAL E/E' RATIO 29.00     FS 13 %    LA volume 200.54 cm3    LV mass 307.18 g    Left Ventricle Relative Wall Thickness 0.41 cm    AV valve area 2.59 cm2    AV Velocity Ratio 0.57     AV index (prosthetic) 0.52     Mean e' 0.05     LVOT area 4.99 cm2    LVOT stroke volume 48.06 cm3    AV peak gradient 5.29 mmHg    E/E' ratio 23.20     LV Systolic Volume Index 53.1 mL/m2    LV Diastolic Volume Index 73.24 mL/m2    LA Volume Index 85.3 mL/m2    LV Mass Index 130.7 g/m2    Triscuspid Valve Regurgitation Peak Gradient 32.49 mmHg    BSA 2.45 m2    Right Atrial Pressure (from IVC) 15 mmHg    TV rest pulmonary artery pressure 47 mmHg    Narrative    · Severely decreased left ventricular systolic function. The estimated   ejection fraction is 23%  · Eccentric left ventricular hypertrophy.  · Left ventricular diastolic dysfunction.  · Septal wall has abnormal motion.  · Local segmental wall motion abnormalities.  · Severe left atrial enlargement.  · Moderate right ventricular enlargement.  · Mildly reduced right ventricular systolic function.  · Severe right atrial enlargement.  · Moderate mitral regurgitation.  · Moderate tricuspid regurgitation.  · Elevated central venous pressure (15 mm Hg).  · The estimated PA systolic pressure is 47 mm Hg  · Pulmonary hypertension present.  · Trivial Pericardial effusion.

## 2019-11-26 NOTE — SUBJECTIVE & OBJECTIVE
Past Medical History:   Diagnosis Date    Anticoagulant long-term use     Cardiomegaly     Chronic combined systolic and diastolic congestive heart failure     Coronary artery disease     Heart attack 2006    Hypertension     Hyperthyroidism, subclinical 1/2/2013    MI (myocardial infarction) 9/22/2013    MI in 2009      Paroxysmal atrial fibrillation     PE (pulmonary embolism) 1/1/2013    IN 2010     S/P ablation of atrial flutter 2008    Stroke 2009    no residual weaknesses       Past Surgical History:   Procedure Laterality Date    RADIOFREQUENCY ABLATION  01/08/2008    for atrial flutter       Review of patient's allergies indicates:   Allergen Reactions    Acetaminophen      Itching    Oxycodone-acetaminophen      Other reaction(s): Itching    Ace inhibitors Other (See Comments)     cough     Current Facility-Administered Medications   Medication Frequency    aspirin EC tablet 81 mg Daily    calcium carbonate 200 mg calcium (500 mg) chewable tablet 500 mg TID PRN    diphenhydrAMINE capsule 25 mg Q6H PRN    DOBUTamine 500mg in D5W 250mL infusion (premix) (NON-TITRATING) Continuous    ferrous sulfate EC tablet 325 mg Daily    furosemide injection 120 mg BID    gabapentin 250 mg/5 mL (5 mL) solution 100 mg Q12H    isosorbide-hydrALAZINE 20-37.5 mg per tablet 1 tablet TID    melatonin tablet 6 mg Nightly PRN    methyl salicylate-menthol 15-10% cream QID PRN    metoprolol succinate (TOPROL-XL) 24 hr tablet 50 mg Daily    mirtazapine tablet 7.5 mg QHS    pantoprazole EC tablet 40 mg Daily    polyethylene glycol packet 17 g BID    senna-docusate 8.6-50 mg per tablet 1 tablet BID    sodium chloride 0.9% flush 10 mL PRN    traMADol tablet 50 mg Q6H PRN    warfarin tablet 8 mg Daily     Family History     Problem Relation (Age of Onset)    Alcohol abuse Father    Hypertension Mother, Father, Sister, Brother    Stroke Mother        Tobacco Use    Smoking status: Never Smoker     Smokeless tobacco: Never Used   Substance and Sexual Activity    Alcohol use: No    Drug use: No    Sexual activity: Never     Review of Systems   Constitutional: Negative for chills, fatigue, fever and unexpected weight change.   HENT: Negative for congestion, postnasal drip and rhinorrhea.    Respiratory: Positive for shortness of breath. Negative for cough, chest tightness and wheezing.    Cardiovascular: Negative for chest pain, palpitations and leg swelling.   Gastrointestinal: Negative for abdominal distention, abdominal pain, constipation, diarrhea, nausea and vomiting.   Endocrine: Negative for polydipsia, polyphagia and polyuria.   Genitourinary: Negative for decreased urine volume, difficulty urinating, dysuria, flank pain, frequency and hematuria.   Musculoskeletal: Negative for arthralgias and back pain.   Skin: Negative for color change, pallor and wound.   Neurological: Negative for dizziness, tremors, facial asymmetry, speech difficulty, weakness, light-headedness, numbness and headaches.   Hematological: Negative for adenopathy. Does not bruise/bleed easily.   Psychiatric/Behavioral: Negative for agitation, behavioral problems, confusion and decreased concentration.     Objective:     Vital Signs (Most Recent):  Temp: 98.2 °F (36.8 °C) (11/26/19 1516)  Pulse: 73 (11/26/19 1538)  Resp: 20 (11/26/19 1516)  BP: (!) 100/54 (11/26/19 1516)  SpO2: 96 % (11/26/19 1516)  O2 Device (Oxygen Therapy): nasal cannula (11/26/19 0800) Vital Signs (24h Range):  Temp:  [97 °F (36.1 °C)-98.2 °F (36.8 °C)] 98.2 °F (36.8 °C)  Pulse:  [59-97] 73  Resp:  [15-20] 20  SpO2:  [95 %-98 %] 96 %  BP: (100-141)/(54-68) 100/54     Weight: 127.2 kg (280 lb 6.8 oz) (11/26/19 0612)  Body mass index is 41.41 kg/m².  Body surface area is 2.49 meters squared.    I/O last 3 completed shifts:  In: 1130 [P.O.:1130]  Out: 2185 [Urine:2185]    Physical Exam   Constitutional: He is oriented to person, place, and time. He appears  well-developed and well-nourished.   HENT:   Head: Normocephalic and atraumatic.   Eyes: Pupils are equal, round, and reactive to light. EOM are normal.   Neck: Normal range of motion. Neck supple. No JVD present.   Cardiovascular: Normal rate, regular rhythm, normal heart sounds and intact distal pulses. Exam reveals no gallop and no friction rub.   No murmur heard.  Pulmonary/Chest: Effort normal and breath sounds normal. No stridor. No respiratory distress. He has no wheezes.   Abdominal: Soft. Bowel sounds are normal. He exhibits distension. There is no tenderness. There is no guarding.   Musculoskeletal: Normal range of motion.   Neurological: He is alert and oriented to person, place, and time. No cranial nerve deficit or sensory deficit. Coordination normal.   Skin: Skin is dry.   Blistering of BLEs with pitting edema   Psychiatric: He has a normal mood and affect. His behavior is normal. Judgment and thought content normal.       Significant Labs:  BMP:   Recent Labs   Lab 11/26/19  0312   GLU 76   CL 99   CO2 28   BUN 64*   CREATININE 3.2*   CALCIUM 9.3   MG 2.5     CBC:   Recent Labs   Lab 11/26/19 0312   WBC 6.44   RBC 2.95*   HGB 6.1*   HCT 22.7*      MCV 77*   MCH 20.7*   MCHC 26.9*     CMP:   Recent Labs   Lab 11/24/19  1539  11/26/19 0312   GLU 86   < > 76   CALCIUM 9.2   < > 9.3   ALBUMIN 3.5  --   --    PROT 8.3  --   --       < > 138   K 4.1   < > 5.1   CO2 24   < > 28      < > 99   BUN 53*   < > 64*   CREATININE 1.9*   < > 3.2*   ALKPHOS 189*  --   --    ALT 11  --   --    AST 21  --   --    BILITOT 2.0*  --   --     < > = values in this interval not displayed.     No results for input(s): COLORU, CLARITYU, SPECGRAV, PHUR, PROTEINUA, GLUCOSEU, BILIRUBINCON, BLOODU, WBCU, RBCU, BACTERIA, MUCUS, NITRITE, LEUKOCYTESUR, UROBILINOGEN, HYALINECASTS in the last 168 hours.  All labs within the past 24 hours have been reviewed.    Significant Imaging:  Labs: Reviewed

## 2019-11-26 NOTE — HPI
Mr. Terrell is a 55yo man with PMH of HFrEF (last 2D ECHO EF 23%, moderate MR, moderate TR, diastolic dysfunction).  hypertension, chronic atrial fibrillation, a flutter status post ablation, CVA in 2009, coronary artery disease, and obstructive sleep apnea who Nephrology is consulted for JEAN-PIERRE on CKD.    He initially presented with a complaint of worsening edema and shortness of breath for a few days. He states he takes Lasix 80mg PO BID and noted that he is urinating less with his current dose of Lasix.  He is on 1.5L Oxygen via NC at home but noticed worsening cough and SOB at rest and with exertion. He also has severe anemia with a hemoglobin of 6.2 which is consistent with his hemoglobin obtained on last admit on 10/23/2019.  The patient refuses blood products as he believes it may cause AIDs.   BNP was done and was significant for a value of 210.  The patient was initially given a mg of Lasix IV with minimal response and this was followed by 120 mg IV per Cardiology. BP reviewed per chart.. No extremely hypotensive episodes. Denies recent NSAID use.     He notes BLE blistering since July which he attributes as a reaction to lasix.

## 2019-11-26 NOTE — CONSULTS
[facility]  Nephrology  Consult Note    Patient Name: Hamlet Terrell  MRN: [id number]  Admission Date: 11/24/2019  Hospital Length of Stay: 2 days  Attending Provider: Markus Howard MD   Primary Care Physician: Carlin Swift MD  Principal Problem:Acute on chronic combined systolic and diastolic heart failure    Inpatient consult to Nephrology  Consult performed by: Alexus Don MD  Consult ordered by: Markus Howard MD        Subjective:     HPI: Mr. Terrell is a 55yo man with PMH of HFrEF (last 2D ECHO EF 23%, moderate MR, moderate TR, diastolic dysfunction).  hypertension, chronic atrial fibrillation, a flutter status post ablation, CVA in 2009, coronary artery disease, and obstructive sleep apnea who Nephrology is consulted for JEAN-PIERRE on CKD.    He initially presented with a complaint of worsening edema and shortness of breath for a few days. He states he takes Lasix 80mg PO BID and noted that he is urinating less with his current dose of Lasix.  He is on 1.5L Oxygen via NC at home but noticed worsening cough and SOB at rest and with exertion. He also has severe anemia with a hemoglobin of 6.2 which is consistent with his hemoglobin obtained on last admit on 10/23/2019.  The patient refuses blood products as he believes it may cause AIDs.   BNP was done and was significant for a value of 210.  The patient was initially given a mg of Lasix IV with minimal response and this was followed by 120 mg IV per Cardiology. BP reviewed per chart.. No extremely hypotensive episodes. Denies recent NSAID use.   He notes BLE blistering since July which he attributes as a reaction to lasix.       Past Medical History:   Diagnosis Date    Anticoagulant long-term use     Cardiomegaly     Chronic combined systolic and diastolic congestive heart failure     Coronary artery disease     Heart attack 2006    Hypertension     Hyperthyroidism, subclinical 1/2/2013    MI (myocardial infarction) 9/22/2013     MI in 2009      Paroxysmal atrial fibrillation     PE (pulmonary embolism) 1/1/2013    IN 2010     S/P ablation of atrial flutter 2008    Stroke 2009    no residual weaknesses       Past Surgical History:   Procedure Laterality Date    RADIOFREQUENCY ABLATION  01/08/2008    for atrial flutter       Review of patient's allergies indicates:   Allergen Reactions    Acetaminophen      Itching    Oxycodone-acetaminophen      Other reaction(s): Itching    Ace inhibitors Other (See Comments)     cough     Current Facility-Administered Medications   Medication Frequency    aspirin EC tablet 81 mg Daily    calcium carbonate 200 mg calcium (500 mg) chewable tablet 500 mg TID PRN    diphenhydrAMINE capsule 25 mg Q6H PRN    DOBUTamine 500mg in D5W 250mL infusion (premix) (NON-TITRATING) Continuous    ferrous sulfate EC tablet 325 mg Daily    furosemide injection 120 mg BID    gabapentin 250 mg/5 mL (5 mL) solution 100 mg Q12H    isosorbide-hydrALAZINE 20-37.5 mg per tablet 1 tablet TID    melatonin tablet 6 mg Nightly PRN    methyl salicylate-menthol 15-10% cream QID PRN    metoprolol succinate (TOPROL-XL) 24 hr tablet 50 mg Daily    mirtazapine tablet 7.5 mg QHS    pantoprazole EC tablet 40 mg Daily    polyethylene glycol packet 17 g BID    senna-docusate 8.6-50 mg per tablet 1 tablet BID    sodium chloride 0.9% flush 10 mL PRN    traMADol tablet 50 mg Q6H PRN    warfarin tablet 8 mg Daily     Family History     Problem Relation (Age of Onset)    Alcohol abuse Father    Hypertension Mother, Father, Sister, Brother    Stroke Mother        Tobacco Use    Smoking status: Never Smoker    Smokeless tobacco: Never Used   Substance and Sexual Activity    Alcohol use: No    Drug use: No    Sexual activity: Never     Review of Systems   Constitutional: Negative for chills, fatigue, fever and unexpected weight change.   HENT: Negative for congestion, postnasal drip and rhinorrhea.    Respiratory: Positive  for shortness of breath. Negative for cough, chest tightness and wheezing.    Cardiovascular: Negative for chest pain, palpitations and leg swelling.   Gastrointestinal: Negative for abdominal distention, abdominal pain, constipation, diarrhea, nausea and vomiting.   Endocrine: Negative for polydipsia, polyphagia and polyuria.   Genitourinary: Negative for decreased urine volume, difficulty urinating, dysuria, flank pain, frequency and hematuria.   Musculoskeletal: Negative for arthralgias and back pain.   Skin: Negative for color change, pallor and wound.   Neurological: Negative for dizziness, tremors, facial asymmetry, speech difficulty, weakness, light-headedness, numbness and headaches.   Hematological: Negative for adenopathy. Does not bruise/bleed easily.   Psychiatric/Behavioral: Negative for agitation, behavioral problems, confusion and decreased concentration.     Objective:     Vital Signs (Most Recent):  Temp: 98.2 °F (36.8 °C) (11/26/19 1516)  Pulse: 73 (11/26/19 1538)  Resp: 20 (11/26/19 1516)  BP: (!) 100/54 (11/26/19 1516)  SpO2: 96 % (11/26/19 1516)  O2 Device (Oxygen Therapy): nasal cannula (11/26/19 0800) Vital Signs (24h Range):  Temp:  [97 °F (36.1 °C)-98.2 °F (36.8 °C)] 98.2 °F (36.8 °C)  Pulse:  [59-97] 73  Resp:  [15-20] 20  SpO2:  [95 %-98 %] 96 %  BP: (100-141)/(54-68) 100/54     Weight: 127.2 kg (280 lb 6.8 oz) (11/26/19 0612)  Body mass index is 41.41 kg/m².  Body surface area is 2.49 meters squared.    I/O last 3 completed shifts:  In: 1130 [P.O.:1130]  Out: 2185 [Urine:2185]    Physical Exam   Constitutional: He is oriented to person, place, and time. He appears well-developed and well-nourished.   HENT:   Head: Normocephalic and atraumatic.   Eyes: Pupils are equal, round, and reactive to light. EOM are normal.   Neck: Normal range of motion. Neck supple. No JVD present.   Cardiovascular: Normal rate, regular rhythm, normal heart sounds and intact distal pulses. Exam reveals no gallop  and no friction rub.   No murmur heard.  Pulmonary/Chest: Effort normal and breath sounds normal. No stridor. No respiratory distress. He has no wheezes.   Abdominal: Soft. Bowel sounds are normal. He exhibits distension. There is no tenderness. There is no guarding.   Musculoskeletal: Normal range of motion.   Neurological: He is alert and oriented to person, place, and time. No cranial nerve deficit or sensory deficit. Coordination normal.   Skin: Skin is dry.   Blistering of BLEs with pitting edema   Psychiatric: He has a normal mood and affect. His behavior is normal. Judgment and thought content normal.       Significant Labs:  BMP:   Recent Labs   Lab 11/26/19 0312   GLU 76   CL 99   CO2 28   BUN 64*   CREATININE 3.2*   CALCIUM 9.3   MG 2.5     CBC:   Recent Labs   Lab 11/26/19 0312   WBC 6.44   RBC 2.95*   HGB 6.1*   HCT 22.7*      MCV 77*   MCH 20.7*   MCHC 26.9*     CMP:   Recent Labs   Lab 11/24/19  1539  11/26/19 0312   GLU 86   < > 76   CALCIUM 9.2   < > 9.3   ALBUMIN 3.5  --   --    PROT 8.3  --   --       < > 138   K 4.1   < > 5.1   CO2 24   < > 28      < > 99   BUN 53*   < > 64*   CREATININE 1.9*   < > 3.2*   ALKPHOS 189*  --   --    ALT 11  --   --    AST 21  --   --    BILITOT 2.0*  --   --     < > = values in this interval not displayed.     No results for input(s): COLORU, CLARITYU, SPECGRAV, PHUR, PROTEINUA, GLUCOSEU, BILIRUBINCON, BLOODU, WBCU, RBCU, BACTERIA, MUCUS, NITRITE, LEUKOCYTESUR, UROBILINOGEN, HYALINECASTS in the last 168 hours.  All labs within the past 24 hours have been reviewed.    Significant Imaging:  Labs: Reviewed    Assessment/Plan:     Microcytic anemia  Per primary team    JEAN-PIERRE (acute kidney injury)  54-year-old male with HFrEF (last 2D ECHO EF 23%, moderate MR, moderate TR, diastolic dysfunction).  hypertension, chronic atrial fibrillation, a flutter status post ablation, CVA in 2009, coronary artery disease, CKD III, and obstructive sleep apnea  presents with ADCHF. Nephro consulted for JEAN-PIERRE on CKD. Cr 3.2 from baseline 1.6.     Was evaluated by Nephrology in 7/2019 for JEAN-PIERRE during ADCHF admission. At the time JEAN-PIERER was thought to be to be multifactorial from labile BP, nephrotoxic meds, & component of cardiorenal. CXR with oval opacity thought to be loculated fluid at site of right lung fissure. Given IV lasix during hospital admission. UOP declined today. Patient still complaining of SOB. On 1.5L at home. UA unremarkable. Last TTE on 4/2019 with EF 23%, PAP 47 and diastolic dysfunction.       Patient reported apprehension about blood transfusion as he thinks he may contract AIDs. Advised that his Hb 6.2 is suboptimal for cardiac function and blood is rigorously screened.     Recommendations:   -F/u ABG & CXR   -will f/u with urine sediment   -Trial of lasix 160mg IV once today with Diuril 500mg IV   - Renal US  - Obtain Urine lytes- Na, Cr, Urea, UPC, protein  - Strict Is and Os and daily weights  - Avoid nephrotoxic medications      Thank you for your consult. I will follow-up with patient. Please contact us if you have any additional questions.    Alexus Don MD  Nephrology  [facility]    ATTENDING PHYSICIAN ATTESTATION  I have personally verified the history and examined the patient. I thoroughly reviewed the demographic, clinical, laboratorial and imaging information available in medical records. I agree with the assessment and recommendations provided by the subspecialty resident who was under my supervision.

## 2019-11-27 LAB
ANION GAP SERPL CALC-SCNC: 13 MMOL/L (ref 8–16)
ANION GAP SERPL CALC-SCNC: 13 MMOL/L (ref 8–16)
ASCENDING AORTA: 3.81 CM
BASOPHILS # BLD AUTO: 0.03 K/UL (ref 0–0.2)
BASOPHILS NFR BLD: 0.5 % (ref 0–1.9)
BLD PROD TYP BPU: NORMAL
BLOOD UNIT EXPIRATION DATE: NORMAL
BLOOD UNIT TYPE CODE: 8400
BLOOD UNIT TYPE: NORMAL
BSA FOR ECHO PROCEDURE: 2.49 M2
BUN SERPL-MCNC: 74 MG/DL (ref 6–20)
BUN SERPL-MCNC: 76 MG/DL (ref 6–20)
CALCIUM SERPL-MCNC: 9.1 MG/DL (ref 8.7–10.5)
CALCIUM SERPL-MCNC: 9.1 MG/DL (ref 8.7–10.5)
CHLORIDE SERPL-SCNC: 98 MMOL/L (ref 95–110)
CHLORIDE SERPL-SCNC: 99 MMOL/L (ref 95–110)
CO2 SERPL-SCNC: 25 MMOL/L (ref 23–29)
CO2 SERPL-SCNC: 26 MMOL/L (ref 23–29)
CODING SYSTEM: NORMAL
CREAT SERPL-MCNC: 3.5 MG/DL (ref 0.5–1.4)
CREAT SERPL-MCNC: 3.7 MG/DL (ref 0.5–1.4)
CREAT UR-MCNC: 52 MG/DL (ref 23–375)
CV ECHO LV RWT: 0.5 CM
DIFFERENTIAL METHOD: ABNORMAL
DISPENSE STATUS: NORMAL
DOP CALC LVOT AREA: 4.4 CM2
DOP CALC LVOT DIAMETER: 2.38 CM
DOP CALC LVOT PEAK VEL: 1.01 M/S
DOP CALC LVOT STROKE VOLUME: 81.59 CM3
DOP CALCLVOT PEAK VEL VTI: 18.35 CM
E/E' RATIO: 16.57 M/S
ECHO LV POSTERIOR WALL: 1.49 CM (ref 0.6–1.1)
EOSINOPHIL # BLD AUTO: 0.2 K/UL (ref 0–0.5)
EOSINOPHIL NFR BLD: 4.4 % (ref 0–8)
ERYTHROCYTE [DISTWIDTH] IN BLOOD BY AUTOMATED COUNT: 20.4 % (ref 11.5–14.5)
EST. GFR  (AFRICAN AMERICAN): 20.3 ML/MIN/1.73 M^2
EST. GFR  (AFRICAN AMERICAN): 21.7 ML/MIN/1.73 M^2
EST. GFR  (NON AFRICAN AMERICAN): 17.6 ML/MIN/1.73 M^2
EST. GFR  (NON AFRICAN AMERICAN): 18.8 ML/MIN/1.73 M^2
FOLATE SERPL-MCNC: 7.4 NG/ML (ref 4–24)
FRACTIONAL SHORTENING: 9 % (ref 28–44)
GLUCOSE SERPL-MCNC: 132 MG/DL (ref 70–110)
GLUCOSE SERPL-MCNC: 96 MG/DL (ref 70–110)
HCT VFR BLD AUTO: 21.2 % (ref 40–54)
HGB BLD-MCNC: 6 G/DL (ref 14–18)
IMM GRANULOCYTES # BLD AUTO: 0.03 K/UL (ref 0–0.04)
IMM GRANULOCYTES NFR BLD AUTO: 0.5 % (ref 0–0.5)
INR PPP: 2.3 (ref 0.8–1.2)
INTERVENTRICULAR SEPTUM: 1.11 CM (ref 0.6–1.1)
IRON SERPL-MCNC: 15 UG/DL (ref 45–160)
IVRT: 0.07 MSEC
LA MAJOR: 7.45 CM
LA MINOR: 7.84 CM
LA WIDTH: 5.12 CM
LACTATE SERPL-SCNC: 1 MMOL/L (ref 0.5–2.2)
LEFT ATRIUM SIZE: 5.73 CM
LEFT ATRIUM VOLUME INDEX: 79.9 ML/M2
LEFT ATRIUM VOLUME: 190.52 CM3
LEFT INTERNAL DIMENSION IN SYSTOLE: 5.38 CM (ref 2.1–4)
LEFT VENTRICLE DIASTOLIC VOLUME INDEX: 73.58 ML/M2
LEFT VENTRICLE DIASTOLIC VOLUME: 175.36 ML
LEFT VENTRICLE MASS INDEX: 144 G/M2
LEFT VENTRICLE SYSTOLIC VOLUME INDEX: 58.8 ML/M2
LEFT VENTRICLE SYSTOLIC VOLUME: 140.09 ML
LEFT VENTRICULAR INTERNAL DIMENSION IN DIASTOLE: 5.93 CM (ref 3.5–6)
LEFT VENTRICULAR MASS: 343.49 G
LV LATERAL E/E' RATIO: 12.89 M/S
LV SEPTAL E/E' RATIO: 23.2 M/S
LYMPHOCYTES # BLD AUTO: 0.7 K/UL (ref 1–4.8)
LYMPHOCYTES NFR BLD: 12.6 % (ref 18–48)
MAGNESIUM SERPL-MCNC: 2.5 MG/DL (ref 1.6–2.6)
MCH RBC QN AUTO: 21.3 PG (ref 27–31)
MCHC RBC AUTO-ENTMCNC: 28.3 G/DL (ref 32–36)
MCV RBC AUTO: 75 FL (ref 82–98)
MONOCYTES # BLD AUTO: 1.2 K/UL (ref 0.3–1)
MONOCYTES NFR BLD: 22.7 % (ref 4–15)
MV PEAK E VEL: 1.16 M/S
NEUTROPHILS # BLD AUTO: 3.2 K/UL (ref 1.8–7.7)
NEUTROPHILS NFR BLD: 59.3 % (ref 38–73)
NRBC BLD-RTO: 0 /100 WBC
PISA TR MAX VEL: 2.5 M/S
PLATELET # BLD AUTO: 247 K/UL (ref 150–350)
PMV BLD AUTO: 10.3 FL (ref 9.2–12.9)
POTASSIUM SERPL-SCNC: 4.2 MMOL/L (ref 3.5–5.1)
POTASSIUM SERPL-SCNC: 4.7 MMOL/L (ref 3.5–5.1)
PROT UR-MCNC: <7 MG/DL (ref 0–15)
PROT UR-MCNC: <7 MG/DL (ref 0–15)
PROT/CREAT UR: NORMAL MG/G{CREAT} (ref 0–0.2)
PROTHROMBIN TIME: 21.8 SEC (ref 9–12.5)
RA MAJOR: 7.53 CM
RA PRESSURE: 15 MMHG
RA WIDTH: 5.67 CM
RBC # BLD AUTO: 2.82 M/UL (ref 4.6–6.2)
RIGHT VENTRICULAR END-DIASTOLIC DIMENSION: 5.13 CM
SATURATED IRON: 3 % (ref 20–50)
SINUS: 3.8 CM
SODIUM SERPL-SCNC: 137 MMOL/L (ref 136–145)
SODIUM SERPL-SCNC: 137 MMOL/L (ref 136–145)
SODIUM UR-SCNC: 81 MMOL/L (ref 20–250)
SODIUM UR-SCNC: 81 MMOL/L (ref 20–250)
STJ: 3.42 CM
TDI LATERAL: 0.09 M/S
TDI SEPTAL: 0.05 M/S
TDI: 0.07 M/S
TOTAL IRON BINDING CAPACITY: 499 UG/DL (ref 250–450)
TR MAX PG: 25 MMHG
TRANS ERYTHROCYTES VOL PATIENT: NORMAL ML
TRANSFERRIN SERPL-MCNC: 337 MG/DL (ref 200–375)
TRICUSPID ANNULAR PLANE SYSTOLIC EXCURSION: 1.38 CM
TV REST PULMONARY ARTERY PRESSURE: 40 MMHG
UUN UR-MCNC: 208 MG/DL (ref 140–1050)
UUN UR-MCNC: 208 MG/DL (ref 140–1050)
VIT B12 SERPL-MCNC: 1447 PG/ML (ref 210–950)
WBC # BLD AUTO: 5.46 K/UL (ref 3.9–12.7)

## 2019-11-27 PROCEDURE — 82746 ASSAY OF FOLIC ACID SERUM: CPT

## 2019-11-27 PROCEDURE — 82607 VITAMIN B-12: CPT

## 2019-11-27 PROCEDURE — 63600175 PHARM REV CODE 636 W HCPCS: Performed by: HOSPITALIST

## 2019-11-27 PROCEDURE — 25000003 PHARM REV CODE 250: Performed by: HOSPITALIST

## 2019-11-27 PROCEDURE — 63600175 PHARM REV CODE 636 W HCPCS: Performed by: STUDENT IN AN ORGANIZED HEALTH CARE EDUCATION/TRAINING PROGRAM

## 2019-11-27 PROCEDURE — 83735 ASSAY OF MAGNESIUM: CPT | Mod: 91

## 2019-11-27 PROCEDURE — 36430 TRANSFUSION BLD/BLD COMPNT: CPT

## 2019-11-27 PROCEDURE — 83735 ASSAY OF MAGNESIUM: CPT

## 2019-11-27 PROCEDURE — 85025 COMPLETE CBC W/AUTO DIFF WBC: CPT

## 2019-11-27 PROCEDURE — 84540 ASSAY OF URINE/UREA-N: CPT

## 2019-11-27 PROCEDURE — P9021 RED BLOOD CELLS UNIT: HCPCS

## 2019-11-27 PROCEDURE — 99233 SBSQ HOSP IP/OBS HIGH 50: CPT | Mod: ,,, | Performed by: HOSPITALIST

## 2019-11-27 PROCEDURE — 80048 BASIC METABOLIC PNL TOTAL CA: CPT

## 2019-11-27 PROCEDURE — 84156 ASSAY OF PROTEIN URINE: CPT

## 2019-11-27 PROCEDURE — 36415 COLL VENOUS BLD VENIPUNCTURE: CPT

## 2019-11-27 PROCEDURE — 83540 ASSAY OF IRON: CPT

## 2019-11-27 PROCEDURE — 99233 PR SUBSEQUENT HOSPITAL CARE,LEVL III: ICD-10-PCS | Mod: ,,, | Performed by: INTERNAL MEDICINE

## 2019-11-27 PROCEDURE — 99233 SBSQ HOSP IP/OBS HIGH 50: CPT | Mod: ,,, | Performed by: INTERNAL MEDICINE

## 2019-11-27 PROCEDURE — 99233 PR SUBSEQUENT HOSPITAL CARE,LEVL III: ICD-10-PCS | Mod: ,,, | Performed by: HOSPITALIST

## 2019-11-27 PROCEDURE — 84300 ASSAY OF URINE SODIUM: CPT

## 2019-11-27 PROCEDURE — 20600001 HC STEP DOWN PRIVATE ROOM

## 2019-11-27 PROCEDURE — 83605 ASSAY OF LACTIC ACID: CPT

## 2019-11-27 PROCEDURE — 80048 BASIC METABOLIC PNL TOTAL CA: CPT | Mod: 91

## 2019-11-27 PROCEDURE — 85610 PROTHROMBIN TIME: CPT

## 2019-11-27 RX ORDER — FERROUS SULFATE 325(65) MG
325 TABLET, DELAYED RELEASE (ENTERIC COATED) ORAL 3 TIMES DAILY
Status: DISCONTINUED | OUTPATIENT
Start: 2019-11-27 | End: 2019-12-03

## 2019-11-27 RX ORDER — HYDROCODONE BITARTRATE AND ACETAMINOPHEN 500; 5 MG/1; MG/1
TABLET ORAL
Status: DISCONTINUED | OUTPATIENT
Start: 2019-11-27 | End: 2019-12-03

## 2019-11-27 RX ADMIN — GABAPENTIN 100 MG: 250 SOLUTION ORAL at 10:11

## 2019-11-27 RX ADMIN — MIRTAZAPINE 7.5 MG: 7.5 TABLET ORAL at 08:11

## 2019-11-27 RX ADMIN — FERROUS SULFATE TAB EC 325 MG (65 MG FE EQUIVALENT) 325 MG: 325 (65 FE) TABLET DELAYED RESPONSE at 10:11

## 2019-11-27 RX ADMIN — HYDRALAZINE HYDROCHLORIDE AND ISOSORBIDE DINITRATE 1 TABLET: 37.5; 2 TABLET, FILM COATED ORAL at 08:11

## 2019-11-27 RX ADMIN — CHLOROTHIAZIDE SODIUM 500 MG: 500 INJECTION, POWDER, LYOPHILIZED, FOR SOLUTION INTRAVENOUS at 08:11

## 2019-11-27 RX ADMIN — WARFARIN SODIUM 8 MG: 3 TABLET ORAL at 08:11

## 2019-11-27 RX ADMIN — POLYETHYLENE GLYCOL 3350 17 G: 17 POWDER, FOR SOLUTION ORAL at 08:11

## 2019-11-27 RX ADMIN — FERROUS SULFATE TAB EC 325 MG (65 MG FE EQUIVALENT) 325 MG: 325 (65 FE) TABLET DELAYED RESPONSE at 08:11

## 2019-11-27 RX ADMIN — FUROSEMIDE 10 MG/HR: 10 INJECTION, SOLUTION INTRAMUSCULAR; INTRAVENOUS at 03:11

## 2019-11-27 RX ADMIN — DOBUTAMINE IN DEXTROSE 2.5 MCG/KG/MIN: 200 INJECTION, SOLUTION INTRAVENOUS at 08:11

## 2019-11-27 RX ADMIN — HYDRALAZINE HYDROCHLORIDE AND ISOSORBIDE DINITRATE 1 TABLET: 37.5; 2 TABLET, FILM COATED ORAL at 10:11

## 2019-11-27 RX ADMIN — SENNOSIDES AND DOCUSATE SODIUM 1 TABLET: 8.6; 5 TABLET ORAL at 08:11

## 2019-11-27 RX ADMIN — GABAPENTIN 100 MG: 250 SOLUTION ORAL at 08:11

## 2019-11-27 RX ADMIN — ASPIRIN 81 MG: 81 TABLET, COATED ORAL at 10:11

## 2019-11-27 NOTE — NURSING TRANSFER
Nursing Transfer Note      11/26/2019     Transfer From: Med surg     Transfer via bed    Transfer with cardiac monitoring    Transported by RN x2     Medicines sent: Dobutamine bag not connected to pt /Wafarrin 8mg    Chart send with patient: YES     Patient reassessed at: 11/27 1700    Upon arrival to floor: cardiac monitor applied, patient oriented to room, call bell in reach and bed in lowest position

## 2019-11-27 NOTE — PT/OT/SLP PROGRESS
"Occupational Therapy      Patient Name:  Hamlet Terrell   MRN:  [id number]    OT orders received, patient's chart reviewed, and evaluation attempted. Patient not seen today secondary to patient unwilling to participate in evaluation. Therapist spent 15 minutes in patient's room encouraging participation and educating patient on importance of mobilization to maintain current level of function; however, patient continued to decline and stated "I'm not doing nothing. Sorry". Will re-attempt evaluation at later date.    During time spent in patient's room, therapist was able to gather the following information:  Patient lives alone in a [facility] with 3 ANAID, no handrails, and tub/shower combo. Patient was independent with ADLs, IADLs, and driving. Patient is on disability and does not work.     Pat Christianson, OT  11/27/2019  "

## 2019-11-27 NOTE — PLAN OF CARE
Plan of care reviewed with patient and he states understanding. No acute events noted at this time. Patient remains free from injury. VS stable. H/H 6.0/21.2 1 unit of blood given. All questions and concerns addressed. Will continue to monitor.

## 2019-11-27 NOTE — PLAN OF CARE
Plan of care discussed with patient, no new questions at this time. VSS, denies SOB, no complaint of pain. Dobutamine/Lasix initiated and maintained. Strict I's and O's. Electrolytes monitored closely and will be replaced as needed. H/H remains low (today 6.0/21.2) but patient refuses blood products. Safety precautions taken, no falls/trauma during the shift. Will continue to monitor.

## 2019-11-27 NOTE — PLAN OF CARE
Pt came down to the floor around 1700. Pt maintained free from falls/ trauma/ injuries and skin breakdown. Pt denied pain. Dobutamine drip initiated per MAR and VS q 15 mins x4 is completed, Night shift RN Ross will continue to monitor. Pt is diuresing with lasix. Pt INR is 2.0, received coumadin 8mg this pm, and vomited with blood shortly after taking the pill. Dr Howard notified and aware. Pt H/H is 6.1/22.7, dicussed getting blood transfusion with Dr. Howard but was unable to obtain consent, d/t pt needing more time to think about the risk of blood transfusion. Pt was difficult to be aroused, MD aware. Plan of care reviewed. Questions and concerns addressed. Will continue to monitor.

## 2019-11-27 NOTE — HPI
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     Reason for Consult? Patient refuses transfusions for Hb 6.0 (MCV 75). Recommendations for optimizing chronic iron deficiency anemia to Hb 8.

## 2019-11-27 NOTE — PROGRESS NOTES
[facility]  Nephrology  Progress Note    Patient Name: Hamlet Terrell  MRN: [id number]  Admission Date: 11/24/2019  Hospital Length of Stay: 3 days  Attending Provider: Markus Howard MD   Primary Care Physician: Carlin Swift MD  Principal Problem:Acute on chronic combined systolic and diastolic heart failure    Subjective:     HPI: Mr. Terrell is a 53yo man with PMH of HFrEF (last 2D ECHO EF 23%, moderate MR, moderate TR, diastolic dysfunction).  hypertension, chronic atrial fibrillation, a flutter status post ablation, CVA in 2009, coronary artery disease, and obstructive sleep apnea who Nephrology is consulted for JEAN-PIERRE on CKD.    He initially presented with a complaint of worsening edema and shortness of breath for a few days. He states he takes Lasix 80mg PO BID and noted that he is urinating less with his current dose of Lasix.  He is on 1.5L Oxygen via NC at home but noticed worsening cough and SOB at rest and with exertion. He also has severe anemia with a hemoglobin of 6.2 which is consistent with his hemoglobin obtained on last admit on 10/23/2019.  The patient refuses blood products as he believes it may cause AIDs.   BNP was done and was significant for a value of 210.  The patient was initially given a mg of Lasix IV with minimal response and this was followed by 120 mg IV per Cardiology. BP reviewed per chart.. No extremely hypotensive episodes. Denies recent NSAID use.   He notes BLE blistering since July which he attributes as a reaction to lasix.       Interval Hx: Patient transferred to cardiac monitoring floor with Cardiology co-management for concerns for cardiogenic shock. He is now on 1.5L of oxygen which is his home prescription. He reports severe fatigue today and reports mild SOB.     Past Surgical History:   Procedure Laterality Date    RADIOFREQUENCY ABLATION  01/08/2008    for atrial flutter       Review of patient's allergies indicates:   Allergen Reactions     Acetaminophen      Itching    Oxycodone-acetaminophen      Other reaction(s): Itching    Ace inhibitors Other (See Comments)     cough     Current Facility-Administered Medications   Medication Frequency    0.9%  NaCl infusion (for blood administration) Q24H PRN    aspirin EC tablet 81 mg Daily    calcium carbonate 200 mg calcium (500 mg) chewable tablet 500 mg TID PRN    diphenhydrAMINE capsule 25 mg Q6H PRN    DOBUTamine 500mg in D5W 250mL infusion (premix) (NON-TITRATING) Continuous    ferrous sulfate EC tablet 325 mg Daily    furosemide (LASIX) 2 mg/mL in sodium chloride 0.9% 100 mL infusion (conc: 2 mg/mL) Continuous    furosemide injection 80 mg Once    gabapentin 250 mg/5 mL (5 mL) solution 100 mg Q12H    isosorbide-hydrALAZINE 20-37.5 mg per tablet 1 tablet TID    melatonin tablet 6 mg Nightly PRN    methyl salicylate-menthol 15-10% cream QID PRN    mirtazapine tablet 7.5 mg QHS    pantoprazole EC tablet 40 mg Daily    polyethylene glycol packet 17 g BID    senna-docusate 8.6-50 mg per tablet 1 tablet BID    sodium chloride 0.9% flush 10 mL PRN    traMADol tablet 50 mg Q6H PRN    warfarin tablet 8 mg Daily     Family History     Problem Relation (Age of Onset)    Alcohol abuse Father    Hypertension Mother, Father, Sister, Brother    Stroke Mother        Tobacco Use    Smoking status: Never Smoker    Smokeless tobacco: Never Used   Substance and Sexual Activity    Alcohol use: No    Drug use: No    Sexual activity: Never     Review of Systems   Constitutional: Negative for chills, fatigue, fever and unexpected weight change.   HENT: Negative for congestion, postnasal drip and rhinorrhea.    Respiratory: Positive for shortness of breath. Negative for cough, chest tightness and wheezing.    Cardiovascular: Negative for chest pain, palpitations and leg swelling.   Gastrointestinal: Negative for abdominal distention, abdominal pain, constipation, diarrhea, nausea and vomiting.    Endocrine: Negative for polydipsia, polyphagia and polyuria.   Genitourinary: Negative for decreased urine volume, difficulty urinating, dysuria, flank pain, frequency and hematuria.   Musculoskeletal: Negative for arthralgias and back pain.   Skin: Negative for color change, pallor and wound.   Neurological: Negative for dizziness, tremors, facial asymmetry, speech difficulty, weakness, light-headedness, numbness and headaches.   Hematological: Negative for adenopathy. Does not bruise/bleed easily.   Psychiatric/Behavioral: Negative for agitation, behavioral problems, confusion and decreased concentration.     Objective:     Vital Signs (Most Recent):  Temp: 97.5 °F (36.4 °C) (11/27/19 1255)  Pulse: 89 (11/27/19 1255)  Resp: 15 (11/27/19 1255)  BP: (!) 149/64 (11/27/19 1255)  SpO2: 100 % (11/27/19 1255)  O2 Device (Oxygen Therapy): nasal cannula (11/27/19 1255) Vital Signs (24h Range):  Temp:  [97.5 °F (36.4 °C)-98.3 °F (36.8 °C)] 97.5 °F (36.4 °C)  Pulse:  [62-94] 89  Resp:  [15-20] 15  SpO2:  [90 %-100 %] 100 %  BP: (100-149)/(53-71) 149/64     Weight: 123.5 kg (272 lb 4.3 oz) (11/27/19 0828)  Body mass index is 40.21 kg/m².  Body surface area is 2.45 meters squared.    I/O last 3 completed shifts:  In: 870 [P.O.:870]  Out: 750 [Urine:750]    Physical Exam   Constitutional: He is oriented to person, place, and time. He appears well-developed and well-nourished.   HENT:   Head: Normocephalic and atraumatic.   Eyes: Pupils are equal, round, and reactive to light. EOM are normal.   Neck: Normal range of motion. Neck supple. No JVD present.   Cardiovascular: Normal rate, regular rhythm, normal heart sounds and intact distal pulses. Exam reveals no gallop and no friction rub.   No murmur heard.  Pulmonary/Chest: Effort normal and breath sounds normal. No stridor. No respiratory distress. He has no wheezes.   Abdominal: Soft. Bowel sounds are normal. He exhibits distension. There is no tenderness. There is no  guarding.   Musculoskeletal: Normal range of motion.   Neurological: He is alert and oriented to person, place, and time. No cranial nerve deficit or sensory deficit. Coordination normal.   Skin: Skin is dry.   Blistering of BLEs with pitting edema   Psychiatric: He has a normal mood and affect. His behavior is normal. Judgment and thought content normal.       Significant Labs:  BMP:   Recent Labs   Lab 11/27/19  0334   GLU 96   CL 99   CO2 25   BUN 74*   CREATININE 3.7*   CALCIUM 9.1   MG 2.5     CBC:   Recent Labs   Lab 11/27/19  0334   WBC 5.46   RBC 2.82*   HGB 6.0*   HCT 21.2*      MCV 75*   MCH 21.3*   MCHC 28.3*     CMP:   Recent Labs   Lab 11/24/19  1539  11/27/19  0334   GLU 86   < > 96   CALCIUM 9.2   < > 9.1   ALBUMIN 3.5  --   --    PROT 8.3  --   --       < > 137   K 4.1   < > 4.7   CO2 24   < > 25      < > 99   BUN 53*   < > 74*   CREATININE 1.9*   < > 3.7*   ALKPHOS 189*  --   --    ALT 11  --   --    AST 21  --   --    BILITOT 2.0*  --   --     < > = values in this interval not displayed.     No results for input(s): COLORU, CLARITYU, SPECGRAV, PHUR, PROTEINUA, GLUCOSEU, BILIRUBINCON, BLOODU, WBCU, RBCU, BACTERIA, MUCUS, NITRITE, LEUKOCYTESUR, UROBILINOGEN, HYALINECASTS in the last 168 hours.  All labs within the past 24 hours have been reviewed.    Significant Imaging:  Labs: Reviewed    Assessment/Plan:     Microcytic anemia  Per primary team    JEAN-PIERRE (acute kidney injury)  54-year-old male with HFrEF (last 2D ECHO EF 23%, moderate MR, moderate TR, diastolic dysfunction).  hypertension, chronic atrial fibrillation, a flutter status post ablation, CVA in 2009, coronary artery disease, CKD III, and obstructive sleep apnea presents with ADCHF. Nephro consulted for JEAN-PIERRE on CKD.     Was evaluated by Nephrology in 7/2019 for JEAN-PIERRE during ADCHF admission. At the time JEAN-PIERRE was thought to be to be multifactorial from labile BP, nephrotoxic meds, & component of cardiorenal. CXR with oval  opacity thought to be loculated fluid at site of right lung fissure. Given IV lasix during hospital admission. UOP declined today. Patient still complaining of SOB. On 1.5L at home. UA unremarkable. Last TTE on 4/2019 with EF 23%, PAP 47 and diastolic dysfunction.       Patient reported apprehension about blood transfusion as he thinks he may contract AIDs. Advised that his Hb 6.2 is suboptimal for cardiac function and blood is rigorously screened. Patient now on dobutamine & lasix gtt for concern for cardiogenic shock. May need RHC?    Recommendations:   -Increase Lasix to 20mg/hr gtt   -Obtain CT chest to further evaluate CXR  -agree with blood transfusion  -F/u Urine lytes- Na, Cr, Urea, UPC, protein  - Strict Is and Os and daily weights, patient reports discrepancy in I/Os- states output is much less  - Avoid nephrotoxic medications        Thank you for your consult. I will follow-up with patient. Please contact us if you have any additional questions.    Alexus Don MD  Nephrology  [facility]    ATTENDING PHYSICIAN ATTESTATION  I have personally verified the history and examined the patient. I thoroughly reviewed the demographic, clinical, laboratorial and imaging information available in medical records. I agree with the assessment and recommendations provided by the subspecialty resident who was under my supervision.

## 2019-11-27 NOTE — ASSESSMENT & PLAN NOTE
54-year-old male with HFrEF (last 2D ECHO EF 23%, moderate MR, moderate TR, diastolic dysfunction).  hypertension, chronic atrial fibrillation, a flutter status post ablation, CVA in 2009, coronary artery disease, CKD III, and obstructive sleep apnea presents with ADCHF. Nephro consulted for JEAN-PIERRE on CKD.     Was evaluated by Nephrology in 7/2019 for JEAN-PIERRE during ADCHF admission. At the time JEAN-PIERRE was thought to be to be multifactorial from labile BP, nephrotoxic meds, & component of cardiorenal. CXR with oval opacity thought to be loculated fluid at site of right lung fissure. Given IV lasix during hospital admission. UOP declined today. Patient still complaining of SOB. On 1.5L at home. UA unremarkable. Last TTE on 4/2019 with EF 23%, PAP 47 and diastolic dysfunction.       Patient reported apprehension about blood transfusion as he thinks he may contract AIDs. Advised that his Hb 6.2 is suboptimal for cardiac function and blood is rigorously screened. Patient now on dobutamine & lasix gtt for concern for cardiogenic shock. May need RHC?    Recommendations:   -Increase Lasix to 20mg/hr gtt   -Obtain CT chest to further evaluate CXR  -agree with blood transfusion  -F/u Urine lytes- Na, Cr, Urea, UPC, protein  - Strict Is and Os and daily weights, patient reports discrepancy in I/Os- states output is much less  - Avoid nephrotoxic medications

## 2019-11-27 NOTE — PROGRESS NOTES
[facility] Hospital Medicine  Progress Note    Primary Team: [facility] MED [facility]  Admit Date: 11/24/2019    Subjective:      HPI:   Mr. Terrell is a 54yoM, Cardiology consulted for Cardiogenic shock evaluate for inotropic support. PMHx of HFrEF (EF 23%, moderate MR, moderate TR, diastolic dysfunction), with pulm HTN (PASP 47 mm Hg), Permanent AF/AFL s/p CTI with CVA in 2009 on Coumadin, CAD and obstructive sleep apnea who presented 11/24/19 with 1 week history of worsening SOB / MUÑOZ and 30 lb weight gain. Patient states compliance with fluid, salt and medication,  However since last week having worsening LE edema with fluid weeping of his legs. Associated with MUÑOZ, Fatigue. He does have chronic severe anemia (refusing transfusion) with Hbg 6, additionally CXR demonstrating worsening RLL consolidation vs. Effusion. Case discussed with cardiology in ED, given Lasix IVP with diuril with minimal UOP, today patient having AMS / lethargy, worsening renal function with elevated BNP, Tbili trending up, with soft BP SBP 90s, requiring supplemental oxygen. Cardiology consulted for Inotropic support, on telemetry patient having NSVT with permanent Afib.He also has severe anemia with a hemoglobin of 6.2 which is consistent with his hemoglobin obtained on last admit on 10/23/2019.  The patient refuses blood products.  Chest x-ray was done which showed increase lobulated oval opacity in the right mid lung field which is a new finding compared to chest x-ray done on 10/23/2019.  BNP was done and was significant for a value of 210.  The patient was initially given a mg of Lasix IV with minimal response and this was followed by 120 mg IV per Cardiology.    ECHO 11/17/2019  · Severely decreased left ventricular systolic function. The estimated ejection fraction is 25%  · Concentric left ventricular hypertrophy.  · Global hypokinetic wall motion.  · Moderate right ventricular enlargement.  · Moderately reduced right  ventricular systolic function.  · Severe biatrial enlargement.  · Moderate mitral regurgitation.  · Moderate tricuspid regurgitation.  · The estimated PA systolic pressure is 40 mm Hg  · Elevated central venous pressure (15 mm Hg).  · Atrial fibrillation observed.    Hospital course:  Admitted  for ADHF on IV Lasix. Weight increasing with UOP minimal and Cr worsening. Patient seen by nephrology and cardiology for assistance. Persistent symptoms as above, with additional lethargy without change in mental status. Patient was started on IV  on 11/26 as well as lasix gtt.     Interval history:   Patient without significant output. Lasix gtt increased to 20 mg/hr. Patient agrees to pRBC transfusion. Nephrology and cardiology following. Await updated cardiology recommendations.    ROS:  As per HPI  General: no fever, no chills, no weight loss, no fatigue  Cardiovascular: no chest pain  Respiratory: no cough, no wheezes  All other systems reviewed & are negative.     Past Medical History:   Diagnosis Date    Anticoagulant long-term use     Cardiomegaly     Chronic combined systolic and diastolic congestive heart failure     Coronary artery disease     Heart attack 2006    Hypertension     Hyperthyroidism, subclinical 1/2/2013    MI (myocardial infarction) 9/22/2013    MI in 2009      Paroxysmal atrial fibrillation     PE (pulmonary embolism) 1/1/2013    IN 2010     S/P ablation of atrial flutter 2008    Stroke 2009    no residual weaknesses     Past Surgical History:   Procedure Laterality Date    RADIOFREQUENCY ABLATION  01/08/2008    for atrial flutter     Social History     Socioeconomic History    Marital status: Single     Spouse name: Not on file    Number of children: Not on file    Years of education: Not on file    Highest education level: Not on file   Occupational History    Not on file   Social Needs    Financial resource strain: Not on file    Food insecurity:     Worry: Not on file      "Inability: Not on file    Transportation needs:     Medical: Not on file     Non-medical: Not on file   Tobacco Use    Smoking status: Never Smoker    Smokeless tobacco: Never Used   Substance and Sexual Activity    Alcohol use: No    Drug use: No    Sexual activity: Never   Lifestyle    Physical activity:     Days per week: Not on file     Minutes per session: Not on file    Stress: Not on file   Relationships    Social connections:     Talks on phone: Not on file     Gets together: Not on file     Attends Buddhist service: Not on file     Active member of club or organization: Not on file     Attends meetings of clubs or organizations: Not on file     Relationship status: Not on file   Other Topics Concern    Not on file   Social History Narrative    Not on file         Objective:   Last 24 Hour Vital Signs:  BP  Min: 101/53  Max: 149/64  Temp  Av.9 °F (36.6 °C)  Min: 97.5 °F (36.4 °C)  Max: 98.3 °F (36.8 °C)  Pulse  Av.5  Min: 62  Max: 94  Resp  Av.8  Min: 15  Max: 19  SpO2  Av.6 %  Min: 90 %  Max: 100 %  Height  Av' 9" (175.3 cm)  Min: 5' 9" (175.3 cm)  Max: 5' 9" (175.3 cm)  Weight  Av.5 kg (272 lb 4.3 oz)  Min: 123.5 kg (272 lb 4.3 oz)  Max: 123.5 kg (272 lb 4.3 oz)  I/O last 3 completed shifts:  In: 870 [P.O.:870]  Out: 750 [Urine:750]    Physical Examination:  GEN: AAOx3, NAD, appears mildly short of breath  HEENT: NCAT, MMM, PERRL, EOMI, oropharynx clear +JVD with HJS  CV: RRR, no m/r, no S3/S4, warm, well perfused  RESP: CTAB, no wheezes/crackles, no increased WOB  ABD: soft, NTND, normoactive BS, no organomegaly  EXTR: no c/c, intact distal pulses x 4, BLE edema  NEURO: PERRL, EOMI, moving all four extremities, intact sensation to light touch, no focal deficits  SKIN: no rashes, lesions, or color changes  PSYCH: normal affect    Laboratory:  I have reviewed all pertinent lab results/findings within the past 24 hours.    Radiology:  I have reviewed all pertinent " imaging results/findings within the past 24 hours.    Current Medications:     Infusions:   DOBUTamine 2.5 mcg/kg/min (11/26/19 1821)    furosemide (LASIX) 2 mg/mL infusion (non-titrating) 20 mg/hr (11/27/19 1611)        Scheduled:   aspirin  81 mg Oral Daily    ferrous sulfate  325 mg Oral Daily    furosemide  80 mg Intravenous Once    gabapentin  100 mg Oral Q12H    isosorbide-hydrALAZINE 20-37.5 mg  1 tablet Oral TID    mirtazapine  7.5 mg Oral QHS    pantoprazole  40 mg Oral Daily    polyethylene glycol  17 g Oral BID    senna-docusate 8.6-50 mg  1 tablet Oral BID    warfarin  8 mg Oral Daily        PRN:  sodium chloride, calcium carbonate, diphenhydrAMINE, melatonin, methyl salicylate-menthol 15-10%, sodium chloride 0.9%, traMADol    Prior records reviewed.    Assessment/Plan:     Hamlet Terrell is a 54 y.o.male with    Acute on chronic combined systolic and diastolic heart failure  Initial poor response to 80 mg of Lasix and patient given Lasix 120 mg IV and Diuril 500 mg IV on admission per Cardiology.  EF 23%.  Added inotropic support with  2.5 mcg/kg/min  Monitor closely for arrhythmia with repletion K and Mg to 4.0/2.0 respectively  Increase lasix to 20 mg/hr  Continue Bidil as tolerated  Hold lopressor    Abnormal CT  Abnormal opacity in middle lobe of right lung  Non-smoker  Obtain CT of the chest today     JEAN-PIERRE on CKD3  Cr 1.9 > 2.3 > 3.2 > 3.7 (baseline 1.6-1.7)  Monitor on diuresis  Continue to monitor electrolytes   Nephrology consulted, appreciate recommendations    Essential hypertension  Adjust antiHTNsives as above     Atrial fibrillation, chronic  Chronic anticoagulation  INR 2.3, therapeutic  Daily INR's  HR controlled. Hold metoprolol with  on board     Iron deficiency anemia  Hb stable at 6.2 and at baseline. No bleeding. Repeat ferritin level. May consider iron infusion since patient refuses blood products  Patient now agrees to blood transfusion - transfuse 1 U  pRBC  Increase iron dosage  Rectal examination refused by patient only visual examination without obvious hemorrhoid  Will need outpatient endoscopic evaluation if not already done; I only see case request for EGD 7/2019  Refer to GI upon d/c     History of CVA (cerebrovascular accident)  No acute management     Chronic respiratory failure  1.5 L NC at home  Reports malfunctioning O2 tank at home - consult CM     DVT PPx: coumadin  Diet: Low sodium with 1200 cc fluid restriction  FULL CODE    Markus Howard MD  Hospital Medicine Staff  [facility]

## 2019-11-28 LAB
ABO + RH BLD: NORMAL
ALBUMIN SERPL BCP-MCNC: 3.3 G/DL (ref 3.5–5.2)
ALP SERPL-CCNC: 207 U/L (ref 55–135)
ALT SERPL W/O P-5'-P-CCNC: 9 U/L (ref 10–44)
ANION GAP SERPL CALC-SCNC: 14 MMOL/L (ref 8–16)
ANION GAP SERPL CALC-SCNC: 16 MMOL/L (ref 8–16)
AST SERPL-CCNC: 13 U/L (ref 10–40)
BASOPHILS # BLD AUTO: 0.03 K/UL (ref 0–0.2)
BASOPHILS NFR BLD: 0.4 % (ref 0–1.9)
BILIRUB SERPL-MCNC: 3.3 MG/DL (ref 0.1–1)
BLD GP AB SCN CELLS X3 SERPL QL: NORMAL
BLD PROD TYP BPU: NORMAL
BLOOD UNIT EXPIRATION DATE: NORMAL
BLOOD UNIT TYPE CODE: 8400
BLOOD UNIT TYPE: NORMAL
BUN SERPL-MCNC: 77 MG/DL (ref 6–20)
BUN SERPL-MCNC: 81 MG/DL (ref 6–20)
CALCIUM SERPL-MCNC: 9.2 MG/DL (ref 8.7–10.5)
CALCIUM SERPL-MCNC: 9.3 MG/DL (ref 8.7–10.5)
CHLORIDE SERPL-SCNC: 98 MMOL/L (ref 95–110)
CHLORIDE SERPL-SCNC: 98 MMOL/L (ref 95–110)
CO2 SERPL-SCNC: 25 MMOL/L (ref 23–29)
CO2 SERPL-SCNC: 28 MMOL/L (ref 23–29)
CODING SYSTEM: NORMAL
CREAT SERPL-MCNC: 3.2 MG/DL (ref 0.5–1.4)
CREAT SERPL-MCNC: 3.4 MG/DL (ref 0.5–1.4)
DIFFERENTIAL METHOD: ABNORMAL
DISPENSE STATUS: NORMAL
EOSINOPHIL # BLD AUTO: 0.3 K/UL (ref 0–0.5)
EOSINOPHIL NFR BLD: 4.5 % (ref 0–8)
ERYTHROCYTE [DISTWIDTH] IN BLOOD BY AUTOMATED COUNT: 20.5 % (ref 11.5–14.5)
EST. GFR  (AFRICAN AMERICAN): 22.5 ML/MIN/1.73 M^2
EST. GFR  (AFRICAN AMERICAN): 24.2 ML/MIN/1.73 M^2
EST. GFR  (NON AFRICAN AMERICAN): 19.5 ML/MIN/1.73 M^2
EST. GFR  (NON AFRICAN AMERICAN): 21 ML/MIN/1.73 M^2
GLUCOSE SERPL-MCNC: 120 MG/DL (ref 70–110)
GLUCOSE SERPL-MCNC: 123 MG/DL (ref 70–110)
HCT VFR BLD AUTO: 21.9 % (ref 40–54)
HGB BLD-MCNC: 6.2 G/DL (ref 14–18)
IMM GRANULOCYTES # BLD AUTO: 0.07 K/UL (ref 0–0.04)
IMM GRANULOCYTES NFR BLD AUTO: 1 % (ref 0–0.5)
INR PPP: 2.6 (ref 0.8–1.2)
LYMPHOCYTES # BLD AUTO: 0.7 K/UL (ref 1–4.8)
LYMPHOCYTES NFR BLD: 10.7 % (ref 18–48)
MAGNESIUM SERPL-MCNC: 2.4 MG/DL (ref 1.6–2.6)
MAGNESIUM SERPL-MCNC: 2.4 MG/DL (ref 1.6–2.6)
MAGNESIUM SERPL-MCNC: 2.5 MG/DL (ref 1.6–2.6)
MCH RBC QN AUTO: 21.4 PG (ref 27–31)
MCHC RBC AUTO-ENTMCNC: 28.3 G/DL (ref 32–36)
MCV RBC AUTO: 76 FL (ref 82–98)
MONOCYTES # BLD AUTO: 1.5 K/UL (ref 0.3–1)
MONOCYTES NFR BLD: 22.1 % (ref 4–15)
NEUTROPHILS # BLD AUTO: 4.2 K/UL (ref 1.8–7.7)
NEUTROPHILS NFR BLD: 61.3 % (ref 38–73)
NRBC BLD-RTO: 0 /100 WBC
PLATELET # BLD AUTO: 253 K/UL (ref 150–350)
PMV BLD AUTO: 10.2 FL (ref 9.2–12.9)
POTASSIUM SERPL-SCNC: 3.8 MMOL/L (ref 3.5–5.1)
POTASSIUM SERPL-SCNC: 4.1 MMOL/L (ref 3.5–5.1)
PROT SERPL-MCNC: 8.1 G/DL (ref 6–8.4)
PROTHROMBIN TIME: 24.7 SEC (ref 9–12.5)
RBC # BLD AUTO: 2.9 M/UL (ref 4.6–6.2)
SODIUM SERPL-SCNC: 139 MMOL/L (ref 136–145)
SODIUM SERPL-SCNC: 140 MMOL/L (ref 136–145)
TRANS ERYTHROCYTES VOL PATIENT: NORMAL ML
WBC # BLD AUTO: 6.83 K/UL (ref 3.9–12.7)

## 2019-11-28 PROCEDURE — 83735 ASSAY OF MAGNESIUM: CPT | Mod: 91

## 2019-11-28 PROCEDURE — 85610 PROTHROMBIN TIME: CPT

## 2019-11-28 PROCEDURE — 99233 PR SUBSEQUENT HOSPITAL CARE,LEVL III: ICD-10-PCS | Mod: ,,, | Performed by: HOSPITALIST

## 2019-11-28 PROCEDURE — 99233 SBSQ HOSP IP/OBS HIGH 50: CPT | Mod: ,,, | Performed by: HOSPITALIST

## 2019-11-28 PROCEDURE — 97162 PT EVAL MOD COMPLEX 30 MIN: CPT

## 2019-11-28 PROCEDURE — 63600175 PHARM REV CODE 636 W HCPCS: Performed by: STUDENT IN AN ORGANIZED HEALTH CARE EDUCATION/TRAINING PROGRAM

## 2019-11-28 PROCEDURE — 83735 ASSAY OF MAGNESIUM: CPT

## 2019-11-28 PROCEDURE — 85025 COMPLETE CBC W/AUTO DIFF WBC: CPT

## 2019-11-28 PROCEDURE — 94761 N-INVAS EAR/PLS OXIMETRY MLT: CPT

## 2019-11-28 PROCEDURE — P9021 RED BLOOD CELLS UNIT: HCPCS

## 2019-11-28 PROCEDURE — 25000003 PHARM REV CODE 250: Performed by: HOSPITALIST

## 2019-11-28 PROCEDURE — 20600001 HC STEP DOWN PRIVATE ROOM

## 2019-11-28 PROCEDURE — 86850 RBC ANTIBODY SCREEN: CPT

## 2019-11-28 PROCEDURE — 36415 COLL VENOUS BLD VENIPUNCTURE: CPT

## 2019-11-28 PROCEDURE — 25000003 PHARM REV CODE 250: Performed by: FAMILY MEDICINE

## 2019-11-28 PROCEDURE — 86920 COMPATIBILITY TEST SPIN: CPT

## 2019-11-28 PROCEDURE — 80053 COMPREHEN METABOLIC PANEL: CPT

## 2019-11-28 PROCEDURE — 80048 BASIC METABOLIC PNL TOTAL CA: CPT

## 2019-11-28 PROCEDURE — 63600175 PHARM REV CODE 636 W HCPCS: Performed by: HOSPITALIST

## 2019-11-28 PROCEDURE — 97535 SELF CARE MNGMENT TRAINING: CPT

## 2019-11-28 PROCEDURE — 97165 OT EVAL LOW COMPLEX 30 MIN: CPT

## 2019-11-28 RX ORDER — HYDROCODONE BITARTRATE AND ACETAMINOPHEN 500; 5 MG/1; MG/1
TABLET ORAL
Status: DISCONTINUED | OUTPATIENT
Start: 2019-11-28 | End: 2019-12-03

## 2019-11-28 RX ORDER — POTASSIUM CHLORIDE 20 MEQ/1
20 TABLET, EXTENDED RELEASE ORAL ONCE
Status: COMPLETED | OUTPATIENT
Start: 2019-11-28 | End: 2019-11-28

## 2019-11-28 RX ADMIN — SENNOSIDES AND DOCUSATE SODIUM 1 TABLET: 8.6; 5 TABLET ORAL at 09:11

## 2019-11-28 RX ADMIN — FERROUS SULFATE TAB EC 325 MG (65 MG FE EQUIVALENT) 325 MG: 325 (65 FE) TABLET DELAYED RESPONSE at 09:11

## 2019-11-28 RX ADMIN — WARFARIN SODIUM 8 MG: 3 TABLET ORAL at 05:11

## 2019-11-28 RX ADMIN — CHLOROTHIAZIDE SODIUM 500 MG: 500 INJECTION, POWDER, LYOPHILIZED, FOR SOLUTION INTRAVENOUS at 09:11

## 2019-11-28 RX ADMIN — FERROUS SULFATE TAB EC 325 MG (65 MG FE EQUIVALENT) 325 MG: 325 (65 FE) TABLET DELAYED RESPONSE at 08:11

## 2019-11-28 RX ADMIN — CHLOROTHIAZIDE SODIUM 500 MG: 500 INJECTION, POWDER, LYOPHILIZED, FOR SOLUTION INTRAVENOUS at 08:11

## 2019-11-28 RX ADMIN — FUROSEMIDE 20 MG/HR: 10 INJECTION, SOLUTION INTRAMUSCULAR; INTRAVENOUS at 04:11

## 2019-11-28 RX ADMIN — HYDRALAZINE HYDROCHLORIDE AND ISOSORBIDE DINITRATE 1 TABLET: 37.5; 2 TABLET, FILM COATED ORAL at 09:11

## 2019-11-28 RX ADMIN — MIRTAZAPINE 7.5 MG: 7.5 TABLET ORAL at 09:11

## 2019-11-28 RX ADMIN — POTASSIUM CHLORIDE 20 MEQ: 1500 TABLET, EXTENDED RELEASE ORAL at 11:11

## 2019-11-28 RX ADMIN — GABAPENTIN 100 MG: 250 SOLUTION ORAL at 09:11

## 2019-11-28 RX ADMIN — DOBUTAMINE IN DEXTROSE 2.5 MCG/KG/MIN: 200 INJECTION, SOLUTION INTRAVENOUS at 11:11

## 2019-11-28 RX ADMIN — HYDRALAZINE HYDROCHLORIDE AND ISOSORBIDE DINITRATE 1 TABLET: 37.5; 2 TABLET, FILM COATED ORAL at 08:11

## 2019-11-28 RX ADMIN — ASPIRIN 81 MG: 81 TABLET, COATED ORAL at 08:11

## 2019-11-28 RX ADMIN — HYDRALAZINE HYDROCHLORIDE AND ISOSORBIDE DINITRATE 1 TABLET: 37.5; 2 TABLET, FILM COATED ORAL at 02:11

## 2019-11-28 RX ADMIN — FERROUS SULFATE TAB EC 325 MG (65 MG FE EQUIVALENT) 325 MG: 325 (65 FE) TABLET DELAYED RESPONSE at 02:11

## 2019-11-28 RX ADMIN — GABAPENTIN 100 MG: 250 SOLUTION ORAL at 08:11

## 2019-11-28 NOTE — ASSESSMENT & PLAN NOTE
54-year-old male with HFrEF (last 2D ECHO EF 23%, moderate MR, moderate TR, diastolic dysfunction).  hypertension, chronic atrial fibrillation, a flutter status post ablation, CVA in 2009, coronary artery disease, CKD III, and obstructive sleep apnea presents with ADCHF. Nephro consulted for JEAN-PIERRE on CKD.     Was evaluated by Nephrology in 7/2019 for JEAN-PIERRE during ADCHF admission. At the time JEAN-PIERRE was thought to be to be multifactorial from labile BP, nephrotoxic meds, & component of cardiorenal. CXR with oval opacity thought to be loculated fluid at site of right lung fissure. Given IV lasix during hospital admission. UOP declined today. Patient still complaining of SOB. On 1.5L at home. UA unremarkable. Last TTE on 4/2019 with EF 23%, PAP 47 and diastolic dysfunction.       Patient reported apprehension about blood transfusion as he thinks he may contract AIDs. Advised that his Hb 6.2 is suboptimal for cardiac function and blood is rigorously screened. Patient now on dobutamine & lasix gtt for concern for cardiogenic shock. May need RHC?    Recommendations:   -cont Lasix to 20mg/hr gtt with Diuril IVPB  -oxygen stable on room air   -Obtain CT chest to further evaluate CXR findings   -F/u Urine lytes- Na, Cr, Urea, UPC, protein  - Strict Is and Os and daily weights, patient reports discrepancy in I/Os- states output is much less  - Avoid nephrotoxic medications

## 2019-11-28 NOTE — PROGRESS NOTES
[facility]  Cardiology  Progress Note    Patient Name: Hamlet Terrell  MRN: [id number]  Admission Date: 11/24/2019  Hospital Length of Stay: 3 days  Code Status: Full Code   Attending Physician: Markus Howard MD   Primary Care Physician: Carlin Swift MD  Expected Discharge Date: 12/3/2019  Principal Problem:Acute on chronic combined systolic and diastolic heart failure    Subjective:     Hospital Course:   No notes on file    Interval History: Still volume overloaded, -700 cc today 1.4L UOP. Warm and wet. BP ok. Lactate -ve.    Review of Systems   Constitution: Negative for chills, decreased appetite and diaphoresis.   HENT: Negative for congestion and ear discharge.    Eyes: Negative for blurred vision and discharge.   Cardiovascular: Negative for chest pain, dyspnea on exertion, irregular heartbeat, leg swelling and paroxysmal nocturnal dyspnea.   Respiratory: Positive for shortness of breath. Negative for cough and hemoptysis.    Gastrointestinal: Negative for abdominal pain.     Objective:     Vital Signs (Most Recent):  Temp: 98.4 °F (36.9 °C) (11/27/19 1947)  Pulse: 82 (11/27/19 1947)  Resp: 17 (11/27/19 1947)  BP: (!) 108/57 (11/27/19 1947)  SpO2: (!) 92 % (11/27/19 1947) Vital Signs (24h Range):  Temp:  [97.5 °F (36.4 °C)-98.4 °F (36.9 °C)] 98.4 °F (36.9 °C)  Pulse:  [62-94] 82  Resp:  [15-18] 17  SpO2:  [90 %-100 %] 92 %  BP: (105-149)/(56-71) 108/57     Weight: 123.5 kg (272 lb 4.3 oz)  Body mass index is 40.21 kg/m².     SpO2: (!) 92 %  O2 Device (Oxygen Therapy): nasal cannula      Intake/Output Summary (Last 24 hours) at 11/27/2019 2104  Last data filed at 11/27/2019 2010  Gross per 24 hour   Intake 1250 ml   Output 2025 ml   Net -775 ml       Lines/Drains/Airways     Peripheral Intravenous Line                 Peripheral IV - Single Lumen 11/24/19 1355 22 G Right Hand 3 days         Peripheral IV - Single Lumen 11/26/19 1806 20 G Anterior;Proximal;Right Forearm 1 day          Peripheral IV - Single Lumen 11/26/19 1806 20 G Anterior;Right Forearm 1 day                Physical Exam   Constitutional: He is oriented to person, place, and time. He appears well-developed and well-nourished. No distress.   Eyes: Pupils are equal, round, and reactive to light. Conjunctivae are normal.   Neck: JVD present. No tracheal deviation present. No thyromegaly present.   Cardiovascular: Normal rate, regular rhythm, normal heart sounds and intact distal pulses. Exam reveals no gallop and no friction rub.   No murmur heard.  Pulses:       Radial pulses are 2+ on the right side, and 2+ on the left side.        Femoral pulses are 2+ on the right side, and 2+ on the left side.  Pulmonary/Chest: Effort normal and breath sounds normal. No respiratory distress. He has no wheezes. He has no rales.   Abdominal: Soft. Bowel sounds are normal. He exhibits no distension. There is no tenderness.   Musculoskeletal: He exhibits edema. He exhibits no deformity.   Neurological: He is alert and oriented to person, place, and time. No cranial nerve deficit. Coordination normal.   Skin: Skin is warm and dry. He is not diaphoretic.   Psychiatric: He has a normal mood and affect. His behavior is normal.       Significant Labs:   CMP   Recent Labs   Lab 11/26/19  0312 11/27/19  0334 11/27/19  1909    137 137   K 5.1 4.7 4.2   CL 99 99 98   CO2 28 25 26   GLU 76 96 132*   BUN 64* 74* 76*   CREATININE 3.2* 3.7* 3.5*   CALCIUM 9.3 9.1 9.1   ANIONGAP 11 13 13   ESTGFRAFRICA 24.2* 20.3* 21.7*   EGFRNONAA 21.0* 17.6* 18.8*       Significant Imaging: Echocardiogram:   Transthoracic echo (TTE) complete (Cupid Only):   Results for orders placed or performed during the hospital encounter of 11/24/19   Echo Color Flow Doppler? Yes; Bubble Contrast? No   Result Value Ref Range    Ascending aorta 3.81 cm    STJ 3.42 cm    IVRT 0.07 msec    IVS 1.11 (A) 0.6 - 1.1 cm    LA size 5.73 cm    Left Atrium Major Axis 7.45 cm    Left Atrium  Minor Axis 7.84 cm    LVIDD 5.93 3.5 - 6.0 cm    LVIDS 5.38 (A) 2.1 - 4.0 cm    LVOT diameter 2.38 cm    LVOT peak VTI 18.35 cm    PW 1.49 (A) 0.6 - 1.1 cm    MV Peak E Karri 1.16 m/s    RA Major Axis 7.53 cm    RA Width 5.67 cm    RVDD 5.13 cm    Sinus 3.80 cm    TAPSE 1.38 cm    TR Max Karri 2.50 m/s    TDI LATERAL 0.09 m/s    TDI SEPTAL 0.05 m/s    LA WIDTH 5.12 cm    LV Diastolic Volume 175.36 mL    LV Systolic Volume 140.09 mL    LVOT peak karri 1.01 m/s    LV LATERAL E/E' RATIO 12.89 m/s    LV SEPTAL E/E' RATIO 23.20 m/s    FS 9 %    LA volume 190.52 cm3    LV mass 343.49 g    Left Ventricle Relative Wall Thickness 0.50 cm    Mean e' 0.07 m/s    LVOT area 4.4 cm2    LVOT stroke volume 81.59 cm3    E/E' ratio 16.57 m/s    LV Systolic Volume Index 58.8 mL/m2    LV Diastolic Volume Index 73.58 mL/m2    LA Volume Index 79.9 mL/m2    LV Mass Index 144 g/m2    Triscuspid Valve Regurgitation Peak Gradient 25 mmHg    BSA 2.49 m2    Right Atrial Pressure (from IVC) 15 mmHg    TV rest pulmonary artery pressure 40 mmHg    Narrative    · Severely decreased left ventricular systolic function. The estimated   ejection fraction is 25%  · Concentric left ventricular hypertrophy.  · Global hypokinetic wall motion.  · Moderate right ventricular enlargement.  · Moderately reduced right ventricular systolic function.  · Severe biatrial enlargement.  · Moderate mitral regurgitation.  · Moderate tricuspid regurgitation.  · The estimated PA systolic pressure is 40 mm Hg  · Elevated central venous pressure (15 mm Hg).  · Atrial fibrillation observed.        Assessment and Plan:     Brief HPI: Mr. Terrell is a 54yoM, Cardiology consulted for Cardiogenic shock evaluate for inotropic support. Patient with NICM EF 23% with mild RV failure, having difficulty diuresis with IV Lasix. Unfortunately worsening renal function / hepatic function and now with encephalopathy with soft blood pressure (sBP 90/50) concern for cardiogenic shock, on exam he  is volume overloaded and warm to touch. Patient does have history of permanent Afib with NSVT, he may not tolerate  2.5mcg/kg/min, that being said he would benefit with inotropic support.     * Acute on chronic combined systolic and diastolic heart failure  - Patient seen and examined  - Cont Lasix @ 20 mg/hr, add Diuril 500 Q12,  2.5  - Check BMP and Mg Q12  - Strict I/O'S, 1500 CC Fluid restrict  -Encourage compliance with meds  - May need extra lasix push with blood transfusion  - Need to get Hb at least btw 7 more like 8          VTE Risk Mitigation (From admission, onward)         Ordered     warfarin tablet 8 mg  Daily      11/25/19 1246     Reason for No Pharmacological VTE Prophylaxis  Once     Question:  Reasons:  Answer:  Already adequately anticoagulated on oral Anticoagulants    11/24/19 1650     IP VTE HIGH RISK PATIENT  Once      11/24/19 1650     Place sequential compression device  Until discontinued      11/24/19 1650     Place CAMI hose  Until discontinued      11/24/19 1650                Nan Murrell MD  Cardiology  [facility]

## 2019-11-28 NOTE — PLAN OF CARE
POC reviewed with pt. VSS-Afib on the monitor, rate controlled. HR . PM coumadin and isosorbide given with 2100 meds.Education on taking coumadin daily given. Diurel IVPB given. Lasix gtts 20/10.  2.5/9.5. Pt resting comfortably. Will continue to monitor.

## 2019-11-28 NOTE — PROGRESS NOTES
[facility] Hospital Medicine  Progress Note    Primary Team: [facility]  Admit Date: 11/24/2019    Subjective:      HPI:   Mr. Terrell is a 54yoM, Cardiology consulted for Cardiogenic shock evaluate for inotropic support. PMHx of HFrEF (EF 23%, moderate MR, moderate TR, diastolic dysfunction), with pulm HTN (PASP 47 mm Hg), Permanent AF/AFL s/p CTI with CVA in 2009 on Coumadin, CAD and obstructive sleep apnea who presented 11/24/19 with 1 week history of worsening SOB / MUÑOZ and 30 lb weight gain. Patient states compliance with fluid, salt and medication,  However since last week having worsening LE edema with fluid weeping of his legs. Associated with MUÑOZ, Fatigue. He does have chronic severe anemia (refusing transfusion) with Hbg 6, additionally CXR demonstrating worsening RLL consolidation vs. Effusion. Case discussed with cardiology in ED, given Lasix IVP with diuril with minimal UOP, today patient having AMS / lethargy, worsening renal function with elevated BNP, Tbili trending up, with soft BP SBP 90s, requiring supplemental oxygen. Cardiology consulted for Inotropic support, on telemetry patient having NSVT with permanent Afib.He also has severe anemia with a hemoglobin of 6.2 which is consistent with his hemoglobin obtained on last admit on 10/23/2019.  The patient refuses blood products.  Chest x-ray was done which showed increase lobulated oval opacity in the right mid lung field which is a new finding compared to chest x-ray done on 10/23/2019.  BNP was done and was significant for a value of 210.  The patient was initially given a mg of Lasix IV with minimal response and this was followed by 120 mg IV per Cardiology.    ECHO 11/17/2019  · Severely decreased left ventricular systolic function. The estimated ejection fraction is 25%  · Concentric left ventricular hypertrophy.  · Global hypokinetic wall motion.  · Moderate right ventricular enlargement.  · Moderately reduced right  ventricular systolic function.  · Severe biatrial enlargement.  · Moderate mitral regurgitation.  · Moderate tricuspid regurgitation.  · The estimated PA systolic pressure is 40 mm Hg  · Elevated central venous pressure (15 mm Hg).  · Atrial fibrillation observed.    Hospital course:  Admitted  for ADHF on IV Lasix. Weight increasing with UOP minimal and Cr worsening. Patient seen by nephrology and cardiology for assistance. Persistent symptoms as above, with additional lethargy without change in mental status. Patient was started on IV  on 11/26 as well as lasix gtt.     Interval history:   Patient with much improved UOP. Lasix gtt continues to 20 mg/hr. Patient agrees to pRBC transfusion once again, will get pRBC 2nd unit today. Nephrology and cardiology following.    ROS:  As per HPI  General: no fever, no chills, no weight loss, no fatigue  Cardiovascular: no chest pain  Respiratory: no cough, no wheezes  All other systems reviewed & are negative.     Past Medical History:   Diagnosis Date    Anticoagulant long-term use     Cardiomegaly     Chronic combined systolic and diastolic congestive heart failure     Coronary artery disease     Heart attack 2006    Hypertension     Hyperthyroidism, subclinical 1/2/2013    MI (myocardial infarction) 9/22/2013    MI in 2009      Paroxysmal atrial fibrillation     PE (pulmonary embolism) 1/1/2013    IN 2010     S/P ablation of atrial flutter 2008    Stroke 2009    no residual weaknesses     Past Surgical History:   Procedure Laterality Date    RADIOFREQUENCY ABLATION  01/08/2008    for atrial flutter     Social History     Socioeconomic History    Marital status: Single     Spouse name: Not on file    Number of children: Not on file    Years of education: Not on file    Highest education level: Not on file   Occupational History    Not on file   Social Needs    Financial resource strain: Not on file    Food insecurity:     Worry: Not on file      Inability: Not on file    Transportation needs:     Medical: Not on file     Non-medical: Not on file   Tobacco Use    Smoking status: Never Smoker    Smokeless tobacco: Never Used   Substance and Sexual Activity    Alcohol use: No    Drug use: No    Sexual activity: Never   Lifestyle    Physical activity:     Days per week: Not on file     Minutes per session: Not on file    Stress: Not on file   Relationships    Social connections:     Talks on phone: Not on file     Gets together: Not on file     Attends Quaker service: Not on file     Active member of club or organization: Not on file     Attends meetings of clubs or organizations: Not on file     Relationship status: Not on file   Other Topics Concern    Not on file   Social History Narrative    Not on file         Objective:   Last 24 Hour Vital Signs:  BP  Min: 105/59  Max: 149/64  Temp  Av °F (36.7 °C)  Min: 97.5 °F (36.4 °C)  Max: 98.4 °F (36.9 °C)  Pulse  Av.5  Min: 82  Max: 107  Resp  Av.6  Min: 15  Max: 18  SpO2  Av.1 %  Min: 90 %  Max: 100 %  I/O last 3 completed shifts:  In: 1490 [P.O.:1490]  Out: 3725 [Urine:3725]    Physical Examination:  GEN: AAOx3, NAD, appears mildly short of breath  HEENT: NCAT, MMM, PERRL, EOMI, oropharynx clear +JVD with HJS  CV: RRR, no m/r, no S3/S4, warm, well perfused  RESP: CTAB, no wheezes/crackles, no increased WOB  ABD: soft, NTND, normoactive BS, no organomegaly  EXTR: no c/c, intact distal pulses x 4, BLE edema  NEURO: PERRL, EOMI, moving all four extremities, intact sensation to light touch, no focal deficits  SKIN: no rashes, lesions, or color changes  PSYCH: normal affect    Laboratory:  I have reviewed all pertinent lab results/findings within the past 24 hours.    Radiology:  I have reviewed all pertinent imaging results/findings within the past 24 hours.    Current Medications:     Infusions:   DOBUTamine 2.5 mcg/kg/min (19)    furosemide (LASIX) 2 mg/mL infusion  (non-titrating) 20 mg/hr (11/28/19 7543)        Scheduled:   aspirin  81 mg Oral Daily    chlorothiazide (DIURIL) IVPB  500 mg Intravenous Q12H    ferrous sulfate  325 mg Oral TID    gabapentin  100 mg Oral Q12H    isosorbide-hydrALAZINE 20-37.5 mg  1 tablet Oral TID    mirtazapine  7.5 mg Oral QHS    pantoprazole  40 mg Oral Daily    polyethylene glycol  17 g Oral BID    senna-docusate 8.6-50 mg  1 tablet Oral BID    warfarin  8 mg Oral Daily        PRN:  sodium chloride, sodium chloride, calcium carbonate, diphenhydrAMINE, melatonin, methyl salicylate-menthol 15-10%, sodium chloride 0.9%, traMADol    Prior records reviewed.    Assessment/Plan:     Hamlet Terrell is a 54 y.o.male with    Acute on chronic combined systolic and diastolic heart failure  Initial poor response to 80 mg of Lasix and patient given Lasix 120 mg IV and Diuril 500 mg IV on admission per Cardiology.  EF 23%.  Added inotropic support with  2.5 mcg/kg/min  Monitor closely for arrhythmia with repletion K and Mg to 4.0/2.0 respectively  C/w lasix to 20 mg/hr  C/w diuril  Continue Bidil as tolerated  Hold lopressor    Abnormal CT  Abnormal opacity in middle lobe of right lung  Non-smoker  Obtain CT of the chest once more euvolemic (could not lay flat)     JEAN-PIERRE on CKD3  Cr 1.9 > 2.3 > 3.2 > 3.7 > 3.2 (baseline 1.6-1.7)  Suspect cardiorenal  Monitor on diuresis  Continue to monitor electrolytes   Nephrology consulted, appreciate recommendations    Essential hypertension  Adjust antiHTNsives as above     Atrial fibrillation, chronic  Chronic anticoagulation  INR 2.6, therapeutic  Daily INR's  HR controlled. Hold metoprolol with  on board     Iron deficiency anemia  Hb stable at 6.2 and at baseline. No bleeding. Repeat ferritin level. May consider iron infusion since patient refuses blood products  Patient now agrees to blood transfusion - transfuse additional 1 U pRBC  Increase iron dosage  Rectal examination refused by patient only  visual examination without obvious hemorrhoid  Will need outpatient endoscopic evaluation if not already done; I only see case request for EGD 7/2019  Refer to GI upon d/c     History of CVA (cerebrovascular accident)  No acute management     Chronic respiratory failure  1.5 L NC at home  Reports malfunctioning O2 tank at home - consult CM     DVT PPx: coumadin  Diet: Low sodium with 1200 cc fluid restriction  FULL CODE    Markus Howard MD  Hospital Medicine Staff  [facility]

## 2019-11-28 NOTE — PLAN OF CARE
Problem: Physical Therapy Goal  Goal: Physical Therapy Goal  Description  Goals to be met by: 2019    Patient will increase functional independence with mobility by performin. Supine to sit with Contact Guard Assistance - not met  2. Sit to stand transfer with Contact Guard Assistance using LRAD - not met  3. Gait  x 50 feet with Contact Guard Assistance using LRAD - not met  4. Ascend/descend 3 stair with Minimal Assistance - not met  5. Stand for 3 minutes with Contact Guard Assistance using LRAD - not met  6. Lower extremity exercise program x15 reps per handout, with assistance as needed - not met     Outcome: Ongoing, Progressing     Eval completed and goals appropriate

## 2019-11-28 NOTE — PLAN OF CARE
Hgb 6.2. 1 unit PRBC transfused. Pt noncompliant with wearing NC. O2 sat >93% on RA. 12 beat run of vtach this morning. Pt asymptomatic, electrolytes wnl and vss. Notified Dr. Howard. Advised to continue to monitor, to secure chat pt has vtach, and to call for over 20 beat run of vtach. Plan of care discussed with patient. Fall precautions in place. Patient has no complaints of pain. Discussed medications and care. Patient has no questions at this time. Will continue to monitor.

## 2019-11-28 NOTE — PT/OT/SLP EVAL
"Physical Therapy Evaluation    Patient Name:  Hamlet Terrell   MRN:  [id number]    Recommendations:     Discharge Recommendations:  nursing facility, skilled   Discharge Equipment Recommendations: (TBD)     Assessment:     Hamlet Terrell is a 54 y.o. male admitted with a medical diagnosis of Acute on chronic combined systolic and diastolic heart failure.  He presents with the following impairments/functional limitations:  weakness, impaired endurance, impaired self care skills, gait instability, pain, impaired cardiopulmonary response to activity, decreased coordination.     Rehab Prognosis: Good; patient would benefit from acute skilled PT services to address these deficits and reach maximum level of function.    Recent Surgery: * No surgery found *      Plan:     During this hospitalization, patient to be seen 3 x/week to address the identified rehab impairments via gait training, therapeutic activities, therapeutic exercises, neuromuscular re-education and progress toward the following goals:    · Plan of Care Expires:  12/27/19    Subjective     Chief Complaint: "the shakes", B LE discomfort   Patient/Family Comments/goals: to get better and return home   Pain/Comfort:  · Pain Rating 1: (discomfort in B LEs)  · Pain Addressed 1: Reposition, Distraction    Patients cultural, spiritual, Faith conflicts given the current situation: no    Living Environment:  Living Environment: Patient lives alone in [facility] with 3 ANAID no handrail. Patient has a tub/shower combo.  Previous level of function: Independent with ADLs, IADLs, ambulation  Roles and Routines: drives; does not work  Equipment Used at Home:  none  Assistance upon Discharge: none; patient reports he has a close friend but he doesn't want to bother him    Objective:     Communicated with RN prior to session. Patient found sitting EOB with OT with telemetry, oxygen, peripheral IV  upon PT entry to room.    General Precautions: Standard,     Orthopedic " Precautions:N/A   Braces: N/A     Exams:  · Cognitive Exam:    · AAOx4  · Follows multistep commands  · Communication clear/fluent   · RLE ROM: WFL  · RLE Strength: formally pt was unable to participate in MMT; pt able to stand   · LLE ROM: WFL  · LLE Strength: formally pt was unable to participate in MMT; pt able to stand     Functional Mobility:  · Bed Mobility: not performed 2nd to pt found in sitting EOB with OT   · Transfers:     · Sit to Stand:  minimum assistance and of 2 persons with hand-held assist from EOB   · Step up on Standing Scale: min A x 2 persons  · Gait: 5 L sidesteps with min A x 2 persons  · Pt demo'd forward flexed posture, decreased jordan,  decreased step length  · Slight SOB  · Pt unsteady       Therapeutic Activities and Exercises:  Educated pt on PT role/POC  Educated pt on importance of OOB activity  Pt verbalized understanding     AM-PAC 6 CLICK MOBILITY  Total Score:12     Patient left HOB elevated with all lines intact, call button in reach and RN notified.    GOALS:   Multidisciplinary Problems     Physical Therapy Goals        Problem: Physical Therapy Goal    Goal Priority Disciplines Outcome Goal Variances Interventions   Physical Therapy Goal     PT, PT/OT Ongoing, Progressing     Description:  Goals to be met by: 2019    Patient will increase functional independence with mobility by performin. Supine to sit with Contact Guard Assistance - not met  2. Sit to stand transfer with Contact Guard Assistance using LRAD - not met  3. Gait  x 50 feet with Contact Guard Assistance using LRAD - not met  4. Ascend/descend 3 stair with Minimal Assistance - not met  5. Stand for 3 minutes with Contact Guard Assistance using LRAD - not met  6. Lower extremity exercise program x15 reps per handout, with assistance as needed - not met                      History:     Past Medical History:   Diagnosis Date    Anticoagulant long-term use     Cardiomegaly     Chronic combined  systolic and diastolic congestive heart failure     Coronary artery disease     Heart attack 2006    Hypertension     Hyperthyroidism, subclinical 1/2/2013    MI (myocardial infarction) 9/22/2013    MI in 2009      Paroxysmal atrial fibrillation     PE (pulmonary embolism) 1/1/2013    IN 2010     S/P ablation of atrial flutter 2008    Stroke 2009    no residual weaknesses       Past Surgical History:   Procedure Laterality Date    RADIOFREQUENCY ABLATION  01/08/2008    for atrial flutter       Time Tracking:     PT Received On: 11/28/19  PT Start Time: 0940     PT Stop Time: 0953  PT Total Time (min): 13 min     Billable Minutes: Evaluation 13    Stacia Shukla, PT, DPT  11/28/2019  [phone]     Passed

## 2019-11-28 NOTE — SUBJECTIVE & OBJECTIVE
Interval History: Still volume overloaded, -700 cc today 1.4L UOP. Warm and wet. BP ok. Lactate -ve.    Review of Systems   Constitution: Negative for chills, decreased appetite and diaphoresis.   HENT: Negative for congestion and ear discharge.    Eyes: Negative for blurred vision and discharge.   Cardiovascular: Negative for chest pain, dyspnea on exertion, irregular heartbeat, leg swelling and paroxysmal nocturnal dyspnea.   Respiratory: Positive for shortness of breath. Negative for cough and hemoptysis.    Gastrointestinal: Negative for abdominal pain.     Objective:     Vital Signs (Most Recent):  Temp: 98.4 °F (36.9 °C) (11/27/19 1947)  Pulse: 82 (11/27/19 1947)  Resp: 17 (11/27/19 1947)  BP: (!) 108/57 (11/27/19 1947)  SpO2: (!) 92 % (11/27/19 1947) Vital Signs (24h Range):  Temp:  [97.5 °F (36.4 °C)-98.4 °F (36.9 °C)] 98.4 °F (36.9 °C)  Pulse:  [62-94] 82  Resp:  [15-18] 17  SpO2:  [90 %-100 %] 92 %  BP: (105-149)/(56-71) 108/57     Weight: 123.5 kg (272 lb 4.3 oz)  Body mass index is 40.21 kg/m².     SpO2: (!) 92 %  O2 Device (Oxygen Therapy): nasal cannula      Intake/Output Summary (Last 24 hours) at 11/27/2019 2104  Last data filed at 11/27/2019 2010  Gross per 24 hour   Intake 1250 ml   Output 2025 ml   Net -775 ml       Lines/Drains/Airways     Peripheral Intravenous Line                 Peripheral IV - Single Lumen 11/24/19 1355 22 G Right Hand 3 days         Peripheral IV - Single Lumen 11/26/19 1806 20 G Anterior;Proximal;Right Forearm 1 day         Peripheral IV - Single Lumen 11/26/19 1806 20 G Anterior;Right Forearm 1 day                Physical Exam   Constitutional: He is oriented to person, place, and time. He appears well-developed and well-nourished. No distress.   Eyes: Pupils are equal, round, and reactive to light. Conjunctivae are normal.   Neck: JVD present. No tracheal deviation present. No thyromegaly present.   Cardiovascular: Normal rate, regular rhythm, normal heart sounds and  intact distal pulses. Exam reveals no gallop and no friction rub.   No murmur heard.  Pulses:       Radial pulses are 2+ on the right side, and 2+ on the left side.        Femoral pulses are 2+ on the right side, and 2+ on the left side.  Pulmonary/Chest: Effort normal and breath sounds normal. No respiratory distress. He has no wheezes. He has no rales.   Abdominal: Soft. Bowel sounds are normal. He exhibits no distension. There is no tenderness.   Musculoskeletal: He exhibits edema. He exhibits no deformity.   Neurological: He is alert and oriented to person, place, and time. No cranial nerve deficit. Coordination normal.   Skin: Skin is warm and dry. He is not diaphoretic.   Psychiatric: He has a normal mood and affect. His behavior is normal.       Significant Labs:   CMP   Recent Labs   Lab 11/26/19  0312 11/27/19  0334 11/27/19  1909    137 137   K 5.1 4.7 4.2   CL 99 99 98   CO2 28 25 26   GLU 76 96 132*   BUN 64* 74* 76*   CREATININE 3.2* 3.7* 3.5*   CALCIUM 9.3 9.1 9.1   ANIONGAP 11 13 13   ESTGFRAFRICA 24.2* 20.3* 21.7*   EGFRNONAA 21.0* 17.6* 18.8*       Significant Imaging: Echocardiogram:   Transthoracic echo (TTE) complete (Cupid Only):   Results for orders placed or performed during the hospital encounter of 11/24/19   Echo Color Flow Doppler? Yes; Bubble Contrast? No   Result Value Ref Range    Ascending aorta 3.81 cm    STJ 3.42 cm    IVRT 0.07 msec    IVS 1.11 (A) 0.6 - 1.1 cm    LA size 5.73 cm    Left Atrium Major Axis 7.45 cm    Left Atrium Minor Axis 7.84 cm    LVIDD 5.93 3.5 - 6.0 cm    LVIDS 5.38 (A) 2.1 - 4.0 cm    LVOT diameter 2.38 cm    LVOT peak VTI 18.35 cm    PW 1.49 (A) 0.6 - 1.1 cm    MV Peak E Karri 1.16 m/s    RA Major Axis 7.53 cm    RA Width 5.67 cm    RVDD 5.13 cm    Sinus 3.80 cm    TAPSE 1.38 cm    TR Max Karri 2.50 m/s    TDI LATERAL 0.09 m/s    TDI SEPTAL 0.05 m/s    LA WIDTH 5.12 cm    LV Diastolic Volume 175.36 mL    LV Systolic Volume 140.09 mL    LVOT peak karri 1.01 m/s     LV LATERAL E/E' RATIO 12.89 m/s    LV SEPTAL E/E' RATIO 23.20 m/s    FS 9 %    LA volume 190.52 cm3    LV mass 343.49 g    Left Ventricle Relative Wall Thickness 0.50 cm    Mean e' 0.07 m/s    LVOT area 4.4 cm2    LVOT stroke volume 81.59 cm3    E/E' ratio 16.57 m/s    LV Systolic Volume Index 58.8 mL/m2    LV Diastolic Volume Index 73.58 mL/m2    LA Volume Index 79.9 mL/m2    LV Mass Index 144 g/m2    Triscuspid Valve Regurgitation Peak Gradient 25 mmHg    BSA 2.49 m2    Right Atrial Pressure (from IVC) 15 mmHg    TV rest pulmonary artery pressure 40 mmHg    Narrative    · Severely decreased left ventricular systolic function. The estimated   ejection fraction is 25%  · Concentric left ventricular hypertrophy.  · Global hypokinetic wall motion.  · Moderate right ventricular enlargement.  · Moderately reduced right ventricular systolic function.  · Severe biatrial enlargement.  · Moderate mitral regurgitation.  · Moderate tricuspid regurgitation.  · The estimated PA systolic pressure is 40 mm Hg  · Elevated central venous pressure (15 mm Hg).  · Atrial fibrillation observed.

## 2019-11-28 NOTE — PLAN OF CARE
OT giovani completed and goals established.  CHRISTINA Cuello/L  Occupational Therapy  Pager #: [phone]  11/28/2019    Problem: Occupational Therapy Goal  Goal: Occupational Therapy Goal  Description  Goals to be met by 12/12/19:    Patient will increase functional independence with ADLs by performing:    UE Dressing with Set-up Assistance.  LE Dressing with Stand-by Assistance.  Grooming while standing with Stand-by Assistance.  Toileting from toilet with Stand-by Assistance for hygiene and clothing management.   Toilet transfer to toilet with Stand-by Assistance.     Outcome: Ongoing, Progressing

## 2019-11-28 NOTE — NURSING TRANSFER
Nursing Transfer Note      2019     Transfer To: CT from 356    Transfer via bed    Transfer with 2L to O2, cardiac monitoring    Transported by transport tech    Medicines sent: Mary Lou and lasix gtts    Chart send with patient: No

## 2019-11-28 NOTE — ASSESSMENT & PLAN NOTE
- Patient seen and examined  - Cont Lasix @ 20 mg/hr, add Diuril 500 Q12  - Check BMP and Mg Q12  - Strict I/O'S, 1500 CC Fluid restrict  -Encourage compliance with meds  - May need extra lasix push with blood transfusion  - Need to get Hb at least btw 7 more like 8

## 2019-11-28 NOTE — PT/OT/SLP EVAL
"Occupational Therapy   Evaluation    Name: Hamlet Terrell  MRN: [id number]  Admitting Diagnosis:  Acute on chronic combined systolic and diastolic heart failure      Recommendations:     Discharge Recommendations: nursing facility, skilled  Discharge Equipment Recommendations:  (TBD)  Barriers to discharge:  Decreased caregiver support    Assessment:     Hamlet Terrell is a 54 y.o. male with a medical diagnosis of Acute on chronic combined systolic and diastolic heart failure.  He presents with the following performance deficits affecting function: weakness, impaired endurance, impaired self care skills, impaired functional mobilty, impaired cardiopulmonary response to activity, pain, edema, decreased lower extremity function. Patient required max encouragement to participate in therapy evaluation and was limited by c/o fatigue and pain/edema in B LEs. Patient completed bed mobility with mod assist and sit <> stand and side stepping with min assist of 2. At this time, patient will continue to benefit from acute skilled therapy intervention to address deficits/underlying impairments and progress towards prior level of function. After discharge, patient would benefit from continued skilled therapy intervention at SNF to progress more towards independence in ADLs and functional mobility before going home.    Rehab Prognosis: Good; patient would benefit from acute skilled OT services to address these deficits and reach maximum level of function.       Plan:     Patient to be seen 3 x/week to address the above listed problems via self-care/home management, therapeutic activities, therapeutic exercises  · Plan of Care Expires: 12/27/19  · Plan of Care Reviewed with: patient    Subjective     Chief Complaint: fatigue; weakness; B LE pain/edema  Patient/Family Comments/goals: "They need to take me off this medicine so I will quit shaking"     Occupational Profile:  Living Environment: Patient lives alone in [facility] with 3 ANAID no " handrail. Patient has a tub/shower combo.  Previous level of function: Independent with ADLs, IADLs, ambulation  Roles and Routines: drives; does not work  Equipment Used at Home:  none  Assistance upon Discharge: none; patient reports he has a close friend but he doesn't want to bother him    Pain/Comfort:  · Pain Rating 1: (Patient did not rate pain but c/o discomfort in B LEs.)    Patients cultural, spiritual, Evangelical conflicts given the current situation: no    Objective:     Communicated with: RN prior to session.  Patient found HOB elevated with peripheral IV, telemetry, oxygen upon OT entry to room.    General Precautions: Standard, fall   Orthopedic Precautions:N/A   Braces: N/A     Occupational Performance:    Bed Mobility:    · Patient completed Scooting hips towards EOB with moderate assistance  · Patient completed Supine to Sit with moderate assistance  · Patient completed Sit to Supine with moderate assistance    Functional Mobility/Transfers:  · Patient completed Sit <> Stand Transfer 2x from EOB with minimum assistance of 2 with no assistive device   · Functional Mobility: Patient able to take 3 side steps towards head of bed with min assist of 2 with hand held assist.  · Patient tolerated sitting EOB ~15 min with supervision.    Activities of Daily Living:  · Grooming: supervision sitting EOB  · Lower Body Dressing: total assistance to adjust socks sitting EOB    Cognitive/Visual Perceptual:  Cognitive/Psychosocial Skills:     -       Oriented to: Person, Place, Time and Situation   -       Follows Commands/attention:Follows multistep  commands  -       Communication: clear/fluent  -       Memory: No Deficits noted  -       Safety awareness/insight to disability: intact   -       Mood/Affect/Coping skills/emotional control: encouragement for participation throughout session    Physical Exam:  Edema:  B LE  Sensation:    -       Impaired  B LE around knees  Dominant hand:    -       Right  Upper  Extremity Range of Motion:     -       Right Upper Extremity: WNL  -       Left Upper Extremity: WNL  Upper Extremity Strength:    -       Right Upper Extremity: WNL  -       Left Upper Extremity: WNL   Strength:    -       Right Upper Extremity: WNL  -       Left Upper Extremity: WNL  Fine Motor Coordination:    -       Impaired  2* tremors; patient reports this is a new issue. MD present and notified    [facility] 6 Click ADL:  [facility] Total Score: 16    Treatment & Education:  --Therapist provided facilitation and instruction of proper body mechanics, energy conservation, and fall prevention strategies during tasks listed above.  --Educated patient on OT POC and answered all questions within OT scope of practice.  Education:    Patient left HOB elevated with all lines intact and call button in reach    GOALS:   Multidisciplinary Problems     Occupational Therapy Goals        Problem: Occupational Therapy Goal    Goal Priority Disciplines Outcome Interventions   Occupational Therapy Goal     OT, PT/OT Ongoing, Progressing    Description:  Goals to be met by 12/12/19:    Patient will increase functional independence with ADLs by performing:    UE Dressing with Set-up Assistance.  LE Dressing with Stand-by Assistance.  Grooming while standing with Stand-by Assistance.  Toileting from toilet with Stand-by Assistance for hygiene and clothing management.   Toilet transfer to toilet with Stand-by Assistance.                      History:     Past Medical History:   Diagnosis Date    Anticoagulant long-term use     Cardiomegaly     Chronic combined systolic and diastolic congestive heart failure     Coronary artery disease     Heart attack 2006    Hypertension     Hyperthyroidism, subclinical 1/2/2013    MI (myocardial infarction) 9/22/2013    MI in 2009      Paroxysmal atrial fibrillation     PE (pulmonary embolism) 1/1/2013    IN 2010     S/P ablation of atrial flutter 2008    Stroke 2009    no residual  weaknesses       Past Surgical History:   Procedure Laterality Date    RADIOFREQUENCY ABLATION  01/08/2008    for atrial flutter       Time Tracking:     OT Date of Treatment: 11/28/19  OT Start Time: 0918  OT Stop Time: 0950  OT Total Time (min): 32 min    Billable Minutes:Evaluation 20  Self Care/Home Management 12    Pat Christianson OT  11/28/2019

## 2019-11-28 NOTE — PROGRESS NOTES
[facility]  Nephrology  Progress Note    Patient Name: Hamlet Terrell  MRN: [id number]  Admission Date: 11/24/2019  Hospital Length of Stay: 4 days  Attending Provider: Markus Howard MD   Primary Care Physician: Carlin Swift MD  Principal Problem:Acute on chronic combined systolic and diastolic heart failure    Subjective:     HPI: Mr. Terrell is a 53yo man with PMH of HFrEF (last 2D ECHO EF 23%, moderate MR, moderate TR, diastolic dysfunction).  hypertension, chronic atrial fibrillation, a flutter status post ablation, CVA in 2009, coronary artery disease, and obstructive sleep apnea who Nephrology is consulted for JEAN-PIERRE on CKD.    He initially presented with a complaint of worsening edema and shortness of breath for a few days. He states he takes Lasix 80mg PO BID and noted that he is urinating less with his current dose of Lasix.  He is on 1.5L Oxygen via NC at home but noticed worsening cough and SOB at rest and with exertion. He also has severe anemia with a hemoglobin of 6.2 which is consistent with his hemoglobin obtained on last admit on 10/23/2019.  The patient refuses blood products as he believes it may cause AIDs.   BNP was done and was significant for a value of 210.  The patient was initially given a mg of Lasix IV with minimal response and this was followed by 120 mg IV per Cardiology. BP reviewed per chart.. No extremely hypotensive episodes. Denies recent NSAID use.   He notes BLE blistering since July which he attributes as a reaction to lasix.       Interval History: UOP overnight over 3 liters, on room air     Review of patient's allergies indicates:   Allergen Reactions    Acetaminophen      Itching    Oxycodone-acetaminophen      Other reaction(s): Itching    Ace inhibitors Other (See Comments)     cough     Current Facility-Administered Medications   Medication Frequency    0.9%  NaCl infusion (for blood administration) Q24H PRN    0.9%  NaCl infusion (for blood  administration) Q24H PRN    aspirin EC tablet 81 mg Daily    calcium carbonate 200 mg calcium (500 mg) chewable tablet 500 mg TID PRN    chlorothiazide (DIURIL) 500 mg in dextrose 5 % 50 mL IVPB Q12H    diphenhydrAMINE capsule 25 mg Q6H PRN    DOBUTamine 500mg in D5W 250mL infusion (premix) (NON-TITRATING) Continuous    ferrous sulfate EC tablet 325 mg TID    furosemide (LASIX) 2 mg/mL in sodium chloride 0.9% 100 mL infusion (conc: 2 mg/mL) Continuous    gabapentin 250 mg/5 mL (5 mL) solution 100 mg Q12H    isosorbide-hydrALAZINE 20-37.5 mg per tablet 1 tablet TID    melatonin tablet 6 mg Nightly PRN    methyl salicylate-menthol 15-10% cream QID PRN    mirtazapine tablet 7.5 mg QHS    pantoprazole EC tablet 40 mg Daily    polyethylene glycol packet 17 g BID    senna-docusate 8.6-50 mg per tablet 1 tablet BID    sodium chloride 0.9% flush 10 mL PRN    traMADol tablet 50 mg Q6H PRN    warfarin tablet 8 mg Daily       Objective:     Vital Signs (Most Recent):  Temp: 99.2 °F (37.3 °C) (11/28/19 1452)  Pulse: 103 (11/28/19 1500)  Resp: 18 (11/28/19 1452)  BP: 127/62 (11/28/19 1452)  SpO2: 97 % (11/28/19 1452)  O2 Device (Oxygen Therapy): room air (11/28/19 1452) Vital Signs (24h Range):  Temp:  [97.5 °F (36.4 °C)-99.2 °F (37.3 °C)] 99.2 °F (37.3 °C)  Pulse:  [] 103  Resp:  [16-18] 18  SpO2:  [92 %-98 %] 97 %  BP: (103-131)/(55-65) 127/62     Weight: 121.9 kg (268 lb 11.9 oz) (11/28/19 1002)  Body mass index is 39.69 kg/m².  Body surface area is 2.44 meters squared.    I/O last 3 completed shifts:  In: 1490 [P.O.:1490]  Out: 3725 [Urine:3725]    Physical Exam   Constitutional: He is oriented to person, place, and time. He appears well-developed and well-nourished.   HENT:   Head: Normocephalic and atraumatic.   Eyes: Pupils are equal, round, and reactive to light. EOM are normal.   Neck: Normal range of motion. Neck supple. No JVD present.   Cardiovascular: Normal rate, regular rhythm, normal heart  sounds and intact distal pulses. Exam reveals no gallop and no friction rub.   No murmur heard.  Pulmonary/Chest: Effort normal and breath sounds normal. No stridor. No respiratory distress. He has no wheezes.   Abdominal: Soft. Bowel sounds are normal. He exhibits distension. There is no tenderness. There is no guarding.   Musculoskeletal: Normal range of motion.   Neurological: He is alert and oriented to person, place, and time. No cranial nerve deficit or sensory deficit. Coordination normal.   Skin: Skin is dry.   Blistering of BLEs with pitting edema   Psychiatric: He has a normal mood and affect. His behavior is normal. Judgment and thought content normal.       Significant Labs:  CBC:   Recent Labs   Lab 11/28/19 0424   WBC 6.83   RBC 2.90*   HGB 6.2*   HCT 21.9*      MCV 76*   MCH 21.4*   MCHC 28.3*     CMP:   Recent Labs   Lab 11/28/19 0424 11/28/19  1352   * 120*   CALCIUM 9.2 9.3   ALBUMIN 3.3*  --    PROT 8.1  --     140   K 4.1 3.8   CO2 25 28   CL 98 98   BUN 81* 77*   CREATININE 3.4* 3.2*   ALKPHOS 207*  --    ALT 9*  --    AST 13  --    BILITOT 3.3*  --      All labs within the past 24 hours have been reviewed.     Significant Imaging:  Labs: Reviewed    Assessment/Plan:     JEAN-PIERRE (acute kidney injury)  54-year-old male with HFrEF (last 2D ECHO EF 23%, moderate MR, moderate TR, diastolic dysfunction).  hypertension, chronic atrial fibrillation, a flutter status post ablation, CVA in 2009, coronary artery disease, CKD III, and obstructive sleep apnea presents with ADCHF. Nephro consulted for JEAN-PIERRE on CKD.     Was evaluated by Nephrology in 7/2019 for JEAN-PIERRE during ADCHF admission. At the time JEAN-PIERRE was thought to be to be multifactorial from labile BP, nephrotoxic meds, & component of cardiorenal. CXR with oval opacity thought to be loculated fluid at site of right lung fissure. Given IV lasix during hospital admission. UOP declined today. Patient still complaining of SOB. On 1.5L at  home. UA unremarkable. Last TTE on 4/2019 with EF 23%, PAP 47 and diastolic dysfunction.       Patient reported apprehension about blood transfusion as he thinks he may contract AIDs. Advised that his Hb 6.2 is suboptimal for cardiac function and blood is rigorously screened. Patient now on dobutamine & lasix gtt for concern for cardiogenic shock. May need RHC?    Recommendations:   -cont Lasix to 20mg/hr gtt with Diuril IVPB  -oxygen stable on room air   -Obtain CT chest to further evaluate CXR findings   -F/u Urine lytes- Na, Cr, Urea, UPC, protein  - Strict Is and Os and daily weights, patient reports discrepancy in I/Os- states output is much less  - Avoid nephrotoxic medications        Thank you for your consult. I will follow-up with patient. Please contact us if you have any additional questions.    Pankaj Finley MD  Nephrology  [facility]

## 2019-11-28 NOTE — SUBJECTIVE & OBJECTIVE
Interval History: UOP overnight over 3 liters, on room air     Review of patient's allergies indicates:   Allergen Reactions    Acetaminophen      Itching    Oxycodone-acetaminophen      Other reaction(s): Itching    Ace inhibitors Other (See Comments)     cough     Current Facility-Administered Medications   Medication Frequency    0.9%  NaCl infusion (for blood administration) Q24H PRN    0.9%  NaCl infusion (for blood administration) Q24H PRN    aspirin EC tablet 81 mg Daily    calcium carbonate 200 mg calcium (500 mg) chewable tablet 500 mg TID PRN    chlorothiazide (DIURIL) 500 mg in dextrose 5 % 50 mL IVPB Q12H    diphenhydrAMINE capsule 25 mg Q6H PRN    DOBUTamine 500mg in D5W 250mL infusion (premix) (NON-TITRATING) Continuous    ferrous sulfate EC tablet 325 mg TID    furosemide (LASIX) 2 mg/mL in sodium chloride 0.9% 100 mL infusion (conc: 2 mg/mL) Continuous    gabapentin 250 mg/5 mL (5 mL) solution 100 mg Q12H    isosorbide-hydrALAZINE 20-37.5 mg per tablet 1 tablet TID    melatonin tablet 6 mg Nightly PRN    methyl salicylate-menthol 15-10% cream QID PRN    mirtazapine tablet 7.5 mg QHS    pantoprazole EC tablet 40 mg Daily    polyethylene glycol packet 17 g BID    senna-docusate 8.6-50 mg per tablet 1 tablet BID    sodium chloride 0.9% flush 10 mL PRN    traMADol tablet 50 mg Q6H PRN    warfarin tablet 8 mg Daily       Objective:     Vital Signs (Most Recent):  Temp: 99.2 °F (37.3 °C) (11/28/19 1452)  Pulse: 103 (11/28/19 1500)  Resp: 18 (11/28/19 1452)  BP: 127/62 (11/28/19 1452)  SpO2: 97 % (11/28/19 1452)  O2 Device (Oxygen Therapy): room air (11/28/19 1452) Vital Signs (24h Range):  Temp:  [97.5 °F (36.4 °C)-99.2 °F (37.3 °C)] 99.2 °F (37.3 °C)  Pulse:  [] 103  Resp:  [16-18] 18  SpO2:  [92 %-98 %] 97 %  BP: (103-131)/(55-65) 127/62     Weight: 121.9 kg (268 lb 11.9 oz) (11/28/19 1002)  Body mass index is 39.69 kg/m².  Body surface area is 2.44 meters squared.    I/O  last 3 completed shifts:  In: 1490 [P.O.:1490]  Out: 3725 [Urine:3725]    Physical Exam   Constitutional: He is oriented to person, place, and time. He appears well-developed and well-nourished.   HENT:   Head: Normocephalic and atraumatic.   Eyes: Pupils are equal, round, and reactive to light. EOM are normal.   Neck: Normal range of motion. Neck supple. No JVD present.   Cardiovascular: Normal rate, regular rhythm, normal heart sounds and intact distal pulses. Exam reveals no gallop and no friction rub.   No murmur heard.  Pulmonary/Chest: Effort normal and breath sounds normal. No stridor. No respiratory distress. He has no wheezes.   Abdominal: Soft. Bowel sounds are normal. He exhibits distension. There is no tenderness. There is no guarding.   Musculoskeletal: Normal range of motion.   Neurological: He is alert and oriented to person, place, and time. No cranial nerve deficit or sensory deficit. Coordination normal.   Skin: Skin is dry.   Blistering of BLEs with pitting edema   Psychiatric: He has a normal mood and affect. His behavior is normal. Judgment and thought content normal.       Significant Labs:  CBC:   Recent Labs   Lab 11/28/19 0424   WBC 6.83   RBC 2.90*   HGB 6.2*   HCT 21.9*      MCV 76*   MCH 21.4*   MCHC 28.3*     CMP:   Recent Labs   Lab 11/28/19  0424 11/28/19  1352   * 120*   CALCIUM 9.2 9.3   ALBUMIN 3.3*  --    PROT 8.1  --     140   K 4.1 3.8   CO2 25 28   CL 98 98   BUN 81* 77*   CREATININE 3.4* 3.2*   ALKPHOS 207*  --    ALT 9*  --    AST 13  --    BILITOT 3.3*  --      All labs within the past 24 hours have been reviewed.     Significant Imaging:  Labs: Reviewed

## 2019-11-29 PROBLEM — R17 ELEVATED BILIRUBIN: Status: ACTIVE | Noted: 2019-11-29

## 2019-11-29 PROBLEM — K92.2 GI BLEED: Status: ACTIVE | Noted: 2019-11-29

## 2019-11-29 PROBLEM — D64.9 SYMPTOMATIC ANEMIA: Status: ACTIVE | Noted: 2019-10-23

## 2019-11-29 PROBLEM — R10.9 ABDOMINAL PAIN: Status: ACTIVE | Noted: 2019-11-29

## 2019-11-29 LAB
ANION GAP SERPL CALC-SCNC: 11 MMOL/L (ref 8–16)
ANION GAP SERPL CALC-SCNC: 15 MMOL/L (ref 8–16)
BASOPHILS # BLD AUTO: 0.04 K/UL (ref 0–0.2)
BASOPHILS NFR BLD: 0.5 % (ref 0–1.9)
BILIRUB DIRECT SERPL-MCNC: 1.2 MG/DL (ref 0.1–0.3)
BILIRUB SERPL-MCNC: 2.5 MG/DL (ref 0.1–1)
BLD PROD TYP BPU: NORMAL
BLOOD UNIT EXPIRATION DATE: NORMAL
BLOOD UNIT TYPE CODE: 8400
BLOOD UNIT TYPE: NORMAL
BUN SERPL-MCNC: 77 MG/DL (ref 6–20)
BUN SERPL-MCNC: 78 MG/DL (ref 6–20)
CALCIUM SERPL-MCNC: 9.4 MG/DL (ref 8.7–10.5)
CALCIUM SERPL-MCNC: 9.7 MG/DL (ref 8.7–10.5)
CHLORIDE SERPL-SCNC: 95 MMOL/L (ref 95–110)
CHLORIDE SERPL-SCNC: 97 MMOL/L (ref 95–110)
CO2 SERPL-SCNC: 28 MMOL/L (ref 23–29)
CO2 SERPL-SCNC: 33 MMOL/L (ref 23–29)
CODING SYSTEM: NORMAL
CREAT SERPL-MCNC: 2.9 MG/DL (ref 0.5–1.4)
CREAT SERPL-MCNC: 2.9 MG/DL (ref 0.5–1.4)
DIFFERENTIAL METHOD: ABNORMAL
DISPENSE STATUS: NORMAL
EOSINOPHIL # BLD AUTO: 0.3 K/UL (ref 0–0.5)
EOSINOPHIL NFR BLD: 3.9 % (ref 0–8)
ERYTHROCYTE [DISTWIDTH] IN BLOOD BY AUTOMATED COUNT: 21.1 % (ref 11.5–14.5)
EST. GFR  (AFRICAN AMERICAN): 27.3 ML/MIN/1.73 M^2
EST. GFR  (AFRICAN AMERICAN): 27.3 ML/MIN/1.73 M^2
EST. GFR  (NON AFRICAN AMERICAN): 23.6 ML/MIN/1.73 M^2
EST. GFR  (NON AFRICAN AMERICAN): 23.6 ML/MIN/1.73 M^2
GLUCOSE SERPL-MCNC: 102 MG/DL (ref 70–110)
GLUCOSE SERPL-MCNC: 124 MG/DL (ref 70–110)
HAPTOGLOB SERPL-MCNC: 192 MG/DL (ref 30–250)
HCT VFR BLD AUTO: 22.5 % (ref 40–54)
HGB BLD-MCNC: 6.6 G/DL (ref 14–18)
IMM GRANULOCYTES # BLD AUTO: 0.05 K/UL (ref 0–0.04)
IMM GRANULOCYTES NFR BLD AUTO: 0.6 % (ref 0–0.5)
INR PPP: 2.7 (ref 0.8–1.2)
LDH SERPL L TO P-CCNC: 282 U/L (ref 110–260)
LYMPHOCYTES # BLD AUTO: 0.9 K/UL (ref 1–4.8)
LYMPHOCYTES NFR BLD: 10.8 % (ref 18–48)
MAGNESIUM SERPL-MCNC: 2.3 MG/DL (ref 1.6–2.6)
MAGNESIUM SERPL-MCNC: 2.3 MG/DL (ref 1.6–2.6)
MCH RBC QN AUTO: 22.1 PG (ref 27–31)
MCHC RBC AUTO-ENTMCNC: 29.3 G/DL (ref 32–36)
MCV RBC AUTO: 76 FL (ref 82–98)
MONOCYTES # BLD AUTO: 1.4 K/UL (ref 0.3–1)
MONOCYTES NFR BLD: 17.8 % (ref 4–15)
NEUTROPHILS # BLD AUTO: 5.2 K/UL (ref 1.8–7.7)
NEUTROPHILS NFR BLD: 66.4 % (ref 38–73)
NRBC BLD-RTO: 0 /100 WBC
PLATELET # BLD AUTO: 261 K/UL (ref 150–350)
PMV BLD AUTO: 10 FL (ref 9.2–12.9)
POTASSIUM SERPL-SCNC: 3.5 MMOL/L (ref 3.5–5.1)
POTASSIUM SERPL-SCNC: 3.6 MMOL/L (ref 3.5–5.1)
PROTHROMBIN TIME: 25.7 SEC (ref 9–12.5)
RBC # BLD AUTO: 2.98 M/UL (ref 4.6–6.2)
SODIUM SERPL-SCNC: 139 MMOL/L (ref 136–145)
SODIUM SERPL-SCNC: 140 MMOL/L (ref 136–145)
TRANS ERYTHROCYTES VOL PATIENT: NORMAL ML
WBC # BLD AUTO: 7.88 K/UL (ref 3.9–12.7)

## 2019-11-29 PROCEDURE — 20600001 HC STEP DOWN PRIVATE ROOM

## 2019-11-29 PROCEDURE — 99233 PR SUBSEQUENT HOSPITAL CARE,LEVL III: ICD-10-PCS | Mod: ,,, | Performed by: INTERNAL MEDICINE

## 2019-11-29 PROCEDURE — 85610 PROTHROMBIN TIME: CPT

## 2019-11-29 PROCEDURE — 99233 SBSQ HOSP IP/OBS HIGH 50: CPT | Mod: ,,, | Performed by: INTERNAL MEDICINE

## 2019-11-29 PROCEDURE — 82248 BILIRUBIN DIRECT: CPT

## 2019-11-29 PROCEDURE — 36430 TRANSFUSION BLD/BLD COMPNT: CPT

## 2019-11-29 PROCEDURE — 82247 BILIRUBIN TOTAL: CPT

## 2019-11-29 PROCEDURE — 83735 ASSAY OF MAGNESIUM: CPT | Mod: 91

## 2019-11-29 PROCEDURE — 80048 BASIC METABOLIC PNL TOTAL CA: CPT

## 2019-11-29 PROCEDURE — 83735 ASSAY OF MAGNESIUM: CPT

## 2019-11-29 PROCEDURE — 83615 LACTATE (LD) (LDH) ENZYME: CPT

## 2019-11-29 PROCEDURE — 25000003 PHARM REV CODE 250: Performed by: HOSPITALIST

## 2019-11-29 PROCEDURE — 99233 SBSQ HOSP IP/OBS HIGH 50: CPT | Mod: ,,, | Performed by: HOSPITALIST

## 2019-11-29 PROCEDURE — 99233 PR SUBSEQUENT HOSPITAL CARE,LEVL III: ICD-10-PCS | Mod: ,,, | Performed by: HOSPITALIST

## 2019-11-29 PROCEDURE — P9021 RED BLOOD CELLS UNIT: HCPCS

## 2019-11-29 PROCEDURE — 83010 ASSAY OF HAPTOGLOBIN QUANT: CPT

## 2019-11-29 PROCEDURE — 85025 COMPLETE CBC W/AUTO DIFF WBC: CPT

## 2019-11-29 PROCEDURE — 63600175 PHARM REV CODE 636 W HCPCS: Performed by: STUDENT IN AN ORGANIZED HEALTH CARE EDUCATION/TRAINING PROGRAM

## 2019-11-29 PROCEDURE — 25000003 PHARM REV CODE 250: Performed by: FAMILY MEDICINE

## 2019-11-29 PROCEDURE — 63600175 PHARM REV CODE 636 W HCPCS: Performed by: HOSPITALIST

## 2019-11-29 PROCEDURE — 36415 COLL VENOUS BLD VENIPUNCTURE: CPT

## 2019-11-29 PROCEDURE — 80048 BASIC METABOLIC PNL TOTAL CA: CPT | Mod: 91

## 2019-11-29 RX ORDER — OXYCODONE HYDROCHLORIDE 5 MG/1
5 TABLET ORAL ONCE
Status: COMPLETED | OUTPATIENT
Start: 2019-11-29 | End: 2019-11-29

## 2019-11-29 RX ORDER — HYDROCODONE BITARTRATE AND ACETAMINOPHEN 500; 5 MG/1; MG/1
TABLET ORAL
Status: DISCONTINUED | OUTPATIENT
Start: 2019-11-29 | End: 2019-12-03

## 2019-11-29 RX ADMIN — WARFARIN SODIUM 8 MG: 3 TABLET ORAL at 05:11

## 2019-11-29 RX ADMIN — HYDRALAZINE HYDROCHLORIDE AND ISOSORBIDE DINITRATE 1 TABLET: 37.5; 2 TABLET, FILM COATED ORAL at 08:11

## 2019-11-29 RX ADMIN — FERROUS SULFATE TAB EC 325 MG (65 MG FE EQUIVALENT) 325 MG: 325 (65 FE) TABLET DELAYED RESPONSE at 08:11

## 2019-11-29 RX ADMIN — GABAPENTIN 100 MG: 250 SOLUTION ORAL at 08:11

## 2019-11-29 RX ADMIN — OXYCODONE HYDROCHLORIDE 5 MG: 5 TABLET ORAL at 09:11

## 2019-11-29 RX ADMIN — ASPIRIN 81 MG: 81 TABLET, COATED ORAL at 08:11

## 2019-11-29 RX ADMIN — HYDRALAZINE HYDROCHLORIDE AND ISOSORBIDE DINITRATE 1 TABLET: 37.5; 2 TABLET, FILM COATED ORAL at 02:11

## 2019-11-29 RX ADMIN — FUROSEMIDE 20 MG/HR: 10 INJECTION, SOLUTION INTRAMUSCULAR; INTRAVENOUS at 03:11

## 2019-11-29 RX ADMIN — TRAMADOL HYDROCHLORIDE 50 MG: 50 TABLET, FILM COATED ORAL at 07:11

## 2019-11-29 RX ADMIN — CHLOROTHIAZIDE SODIUM 500 MG: 500 INJECTION, POWDER, LYOPHILIZED, FOR SOLUTION INTRAVENOUS at 08:11

## 2019-11-29 RX ADMIN — PANTOPRAZOLE SODIUM 40 MG: 40 TABLET, DELAYED RELEASE ORAL at 08:11

## 2019-11-29 RX ADMIN — MIRTAZAPINE 7.5 MG: 7.5 TABLET ORAL at 09:11

## 2019-11-29 RX ADMIN — FERROUS SULFATE TAB EC 325 MG (65 MG FE EQUIVALENT) 325 MG: 325 (65 FE) TABLET DELAYED RESPONSE at 02:11

## 2019-11-29 RX ADMIN — FUROSEMIDE 20 MG/HR: 10 INJECTION, SOLUTION INTRAMUSCULAR; INTRAVENOUS at 04:11

## 2019-11-29 NOTE — ASSESSMENT & PLAN NOTE
- Recommended obtain Direct Bili, hemolysis workup, hep panel and hemochromatosis workup.   - If remarkable, consider Hepatology consult

## 2019-11-29 NOTE — ASSESSMENT & PLAN NOTE
Patient complains of lower abdominal pain, tender to palpation   - Recommended obtain CT abdomen/pelvis

## 2019-11-29 NOTE — PLAN OF CARE
11/29/19 0721   Discharge Reassessment   Assessment Type Discharge Planning Reassessment   Do you have any problems affording any of your prescribed medications? TBD   Discharge Plan A Home;Home Health   DME Needed Upon Discharge  none

## 2019-11-29 NOTE — PROGRESS NOTES
[facility] Hospital Medicine  Progress Note    Primary Team: [facility] HOSP MED C  Admit Date: 11/24/2019    Subjective:      HPI:   Mr. Terrell is a 54yoM, Cardiology consulted for Cardiogenic shock evaluate for inotropic support. PMHx of HFrEF (EF 23%, moderate MR, moderate TR, diastolic dysfunction), with pulm HTN (PASP 47 mm Hg), Permanent AF/AFL s/p CTI with CVA in 2009 on Coumadin, CAD and obstructive sleep apnea who presented 11/24/19 with 1 week history of worsening SOB / MUÑOZ and 30 lb weight gain. Patient states compliance with fluid, salt and medication,  However since last week having worsening LE edema with fluid weeping of his legs. Associated with MUÑOZ, Fatigue. He does have chronic severe anemia (refusing transfusion) with Hbg 6, additionally CXR demonstrating worsening RLL consolidation vs. Effusion. Case discussed with cardiology in ED, given Lasix IVP with diuril with minimal UOP, today patient having AMS / lethargy, worsening renal function with elevated BNP, Tbili trending up, with soft BP SBP 90s, requiring supplemental oxygen. Cardiology consulted for Inotropic support, on telemetry patient having NSVT with permanent Afib.He also has severe anemia with a hemoglobin of 6.2 which is consistent with his hemoglobin obtained on last admit on 10/23/2019.  The patient refuses blood products.  Chest x-ray was done which showed increase lobulated oval opacity in the right mid lung field which is a new finding compared to chest x-ray done on 10/23/2019.  BNP was done and was significant for a value of 210.  The patient was initially given a mg of Lasix IV with minimal response and this was followed by 120 mg IV per Cardiology.    ECHO 11/17/2019  · Severely decreased left ventricular systolic function. The estimated ejection fraction is 25%  · Concentric left ventricular hypertrophy.  · Global hypokinetic wall motion.  · Moderate right ventricular enlargement.  · Moderately reduced right  ventricular systolic function.  · Severe biatrial enlargement.  · Moderate mitral regurgitation.  · Moderate tricuspid regurgitation.  · The estimated PA systolic pressure is 40 mm Hg  · Elevated central venous pressure (15 mm Hg).  · Atrial fibrillation observed.    Hospital course:  Admitted  for ADHF on IV Lasix. Weight increasing with UOP minimal and Cr worsening. Patient seen by nephrology and cardiology for assistance. Persistent symptoms as above, with additional lethargy without change in mental status. Patient was started on IV  on 11/26 as well as lasix gtt. Diuril augmentation added. Patient with persistent anemia despite blood transfusions. GI was consulted with concern for occult bleeding. Patient denies active bleeding and refuses rectal examination.     Interval history:   Patient with improving UOP, -2.5 L. Lasix gtt continues to 20 mg/hr with diuril agumention. Patient agrees to pRBC transfusion once again, will get pRBC 3rd unit today. Nephrology and cardiology following.    ROS:  As per HPI  General: no fever, no chills, no weight loss, no fatigue  Cardiovascular: no chest pain  Respiratory: no cough, no wheezes  All other systems reviewed & are negative.     Past Medical History:   Diagnosis Date    Anticoagulant long-term use     Cardiomegaly     Chronic combined systolic and diastolic congestive heart failure     Coronary artery disease     Heart attack 2006    Hypertension     Hyperthyroidism, subclinical 1/2/2013    MI (myocardial infarction) 9/22/2013    MI in 2009      Paroxysmal atrial fibrillation     PE (pulmonary embolism) 1/1/2013    IN 2010     S/P ablation of atrial flutter 2008    Stroke 2009    no residual weaknesses     Past Surgical History:   Procedure Laterality Date    RADIOFREQUENCY ABLATION  01/08/2008    for atrial flutter     Social History     Socioeconomic History    Marital status: Single     Spouse name: Not on file    Number of children: Not on file     Years of education: Not on file    Highest education level: Not on file   Occupational History    Not on file   Social Needs    Financial resource strain: Not on file    Food insecurity:     Worry: Not on file     Inability: Not on file    Transportation needs:     Medical: Not on file     Non-medical: Not on file   Tobacco Use    Smoking status: Never Smoker    Smokeless tobacco: Never Used   Substance and Sexual Activity    Alcohol use: No    Drug use: No    Sexual activity: Never   Lifestyle    Physical activity:     Days per week: Not on file     Minutes per session: Not on file    Stress: Not on file   Relationships    Social connections:     Talks on phone: Not on file     Gets together: Not on file     Attends Taoism service: Not on file     Active member of club or organization: Not on file     Attends meetings of clubs or organizations: Not on file     Relationship status: Not on file   Other Topics Concern    Not on file   Social History Narrative    Not on file         Objective:   Last 24 Hour Vital Signs:  BP  Min: 103/59  Max: 135/60  Temp  Av.3 °F (36.8 °C)  Min: 97.6 °F (36.4 °C)  Max: 99.2 °F (37.3 °C)  Pulse  Av.5  Min: 63  Max: 114  Resp  Av.5  Min: 18  Max: 19  SpO2  Av.6 %  Min: 91 %  Max: 97 %  Weight  Av kg (271 lb 0.9 oz)  Min: 121.9 kg (268 lb 11.9 oz)  Max: 124 kg (273 lb 5.9 oz)  I/O last 3 completed shifts:  In: 1320 [P.O.:1320]  Out: 4950 [Urine:4950]    Physical Examination:  GEN: AAOx3, NAD, appears mildly short of breath  HEENT: NCAT, MMM, PERRL, EOMI, oropharynx clear +JVD with HJS  CV: RRR, no m/r, no S3/S4, warm, well perfused  RESP: CTAB, no wheezes/crackles, no increased WOB  ABD: soft, NTND, normoactive BS, no organomegaly  EXTR: no c/c, intact distal pulses x 4, BLE edema  NEURO: PERRL, EOMI, moving all four extremities, intact sensation to light touch, no focal deficits  SKIN: no rashes, lesions, or color changes  PSYCH: normal  affect    Laboratory:  I have reviewed all pertinent lab results/findings within the past 24 hours.    Radiology:  I have reviewed all pertinent imaging results/findings within the past 24 hours.    Current Medications:     Infusions:   DOBUTamine 2.5 mcg/kg/min (11/28/19 0058)    furosemide (LASIX) 2 mg/mL infusion (non-titrating) 20 mg/hr (11/29/19 1617)        Scheduled:   aspirin  81 mg Oral Daily    chlorothiazide (DIURIL) IVPB  500 mg Intravenous Q12H    ferrous sulfate  325 mg Oral TID    gabapentin  100 mg Oral Q12H    isosorbide-hydrALAZINE 20-37.5 mg  1 tablet Oral TID    mirtazapine  7.5 mg Oral QHS    pantoprazole  40 mg Oral Daily    polyethylene glycol  17 g Oral BID    senna-docusate 8.6-50 mg  1 tablet Oral BID    warfarin  8 mg Oral Daily        PRN:  sodium chloride, sodium chloride, sodium chloride, calcium carbonate, diphenhydrAMINE, melatonin, methyl salicylate-menthol 15-10%, sodium chloride 0.9%, traMADol    Prior records reviewed.    Assessment/Plan:     Hamlet Terrell is a 54 y.o.male with    Acute on chronic combined systolic and diastolic heart failure  Initial poor response to 80 mg of Lasix and patient given Lasix 120 mg IV and Diuril 500 mg IV on admission per Cardiology.  EF 23%.  Added inotropic support with  2.5 mcg/kg/min  Monitor closely for arrhythmia with repletion K and Mg to 4.0/2.0 respectively  C/w lasix to 20 mg/hr  C/w diuril  Continue Bidil as tolerated  Hold lopressor    Abnormal CT  Abnormal opacity in middle lobe of right lung  Non-smoker  Obtain CT of the chest once more euvolemic (could not lay flat)     JEAN-PIERRE on CKD3  Cr 1.9 > 2.3 > 3.2 > 3.7 > 3.2 >2.9 (baseline 1.6-1.7)  Suspect cardiorenal  Monitor on diuresis  Continue to monitor electrolytes   Nephrology consulted, appreciate recommendations    Essential hypertension  Adjust antiHTNsives as above     Atrial fibrillation, chronic  Chronic anticoagulation  INR 2.7, therapeutic  Daily INR's  HR  controlled. Hold metoprolol with  on board     Iron deficiency anemia  Patient with LANDON, initially refused blood tranfusions  Patient now agrees to blood transfusion - transfuse additional 1 U pRBC (3 total)  Increase iron dosage  Rectal examination refused by patient only visual examination without obvious hemorrhoid  GI consult given c/f occult bleeding with inadequate response to blood transfusion     History of CVA (cerebrovascular accident)  No acute management     Chronic respiratory failure  1.5 L NC at home  Reports malfunctioning O2 tank at home - consult CM     DVT PPx: coumadin  Diet: Low sodium with 1200 cc fluid restriction  FULL CODE    Markus Howard MD  Hospital Medicine Staff  [facility]

## 2019-11-29 NOTE — SUBJECTIVE & OBJECTIVE
Past Medical History:   Diagnosis Date    Anticoagulant long-term use     Cardiomegaly     Chronic combined systolic and diastolic congestive heart failure     Coronary artery disease     Heart attack 2006    Hypertension     Hyperthyroidism, subclinical 1/2/2013    MI (myocardial infarction) 9/22/2013    MI in 2009      Paroxysmal atrial fibrillation     PE (pulmonary embolism) 1/1/2013    IN 2010     S/P ablation of atrial flutter 2008    Stroke 2009    no residual weaknesses       Past Surgical History:   Procedure Laterality Date    RADIOFREQUENCY ABLATION  01/08/2008    for atrial flutter       Review of patient's allergies indicates:   Allergen Reactions    Acetaminophen      Itching    Oxycodone-acetaminophen      Other reaction(s): Itching    Ace inhibitors Other (See Comments)     cough     Family History     Problem Relation (Age of Onset)    Alcohol abuse Father    Hypertension Mother, Father, Sister, Brother    Stroke Mother        Tobacco Use    Smoking status: Never Smoker    Smokeless tobacco: Never Used   Substance and Sexual Activity    Alcohol use: No    Drug use: No    Sexual activity: Never     Review of Systems   Constitutional: Positive for fatigue. Negative for unexpected weight change.   HENT: Negative for trouble swallowing.    Eyes: Negative for pain.   Respiratory: Negative for cough.    Cardiovascular: Positive for leg swelling. Negative for chest pain.   Gastrointestinal: Negative for abdominal pain, anal bleeding, blood in stool, constipation, diarrhea, nausea and vomiting.   Genitourinary: Negative for hematuria.   Skin: Negative for color change.   Neurological: Negative for headaches.   Psychiatric/Behavioral: Negative for agitation.     Objective:     Vital Signs (Most Recent):  Temp: 98 °F (36.7 °C) (11/29/19 0700)  Pulse: 103 (11/29/19 0736)  Resp: 19 (11/29/19 0700)  BP: (!) 129/54 (11/29/19 0700)  SpO2: (!) 92 % (11/29/19 0700) Vital Signs (24h  Range):  Temp:  [97.6 °F (36.4 °C)-99.2 °F (37.3 °C)] 98 °F (36.7 °C)  Pulse:  [] 103  Resp:  [18-19] 19  SpO2:  [91 %-97 %] 92 %  BP: (103-135)/(54-62) 129/54     Weight: 124 kg (273 lb 5.9 oz) (11/29/19 0700)  Body mass index is 40.37 kg/m².      Intake/Output Summary (Last 24 hours) at 11/29/2019 1032  Last data filed at 11/29/2019 0800  Gross per 24 hour   Intake 660 ml   Output 2825 ml   Net -2165 ml       Lines/Drains/Airways     Peripheral Intravenous Line                 Peripheral IV - Single Lumen 11/24/19 1355 22 G Right Hand 4 days         Peripheral IV - Single Lumen 11/26/19 1806 20 G Anterior;Proximal;Right Forearm 2 days         Peripheral IV - Single Lumen 11/26/19 1806 20 G Anterior;Right Forearm 2 days                Physical Exam   Constitutional: He is oriented to person, place, and time. He appears well-developed and well-nourished. No distress.   HENT:   Head: Atraumatic.   Eyes: Pupils are equal, round, and reactive to light.   Neck: Normal range of motion. Neck supple.   Cardiovascular: Normal rate.   Pulmonary/Chest: Effort normal.   Abdominal: Bowel sounds are normal. He exhibits distension. There is no tenderness.   Musculoskeletal: Normal range of motion. He exhibits edema.   Neurological: He is alert and oriented to person, place, and time.   Skin: Skin is warm.   Psychiatric: He has a normal mood and affect.       Significant Labs:  CBC:   Recent Labs   Lab 11/28/19 0424 11/29/19 0304   WBC 6.83 7.88   HGB 6.2* 6.6*   HCT 21.9* 22.5*    261     BMP:   Recent Labs   Lab 11/29/19 0304         K 3.5   CL 97   CO2 28   BUN 77*   CREATININE 2.9*   CALCIUM 9.4   MG 2.3     CMP:   Recent Labs   Lab 11/28/19 0424 11/29/19  0304   *   < > 102   CALCIUM 9.2   < > 9.4   ALBUMIN 3.3*  --   --    PROT 8.1  --   --       < > 140   K 4.1   < > 3.5   CO2 25   < > 28   CL 98   < > 97   BUN 81*   < > 77*   CREATININE 3.4*   < > 2.9*   ALKPHOS 207*  --   --     ALT 9*  --   --    AST 13  --   --    BILITOT 3.3*  --   --     < > = values in this interval not displayed.     Coagulation:   Recent Labs   Lab 11/29/19  0304   INR 2.7*       Significant Imaging:  Imaging results within the past 24 hours have been reviewed.

## 2019-11-29 NOTE — CONSULTS
"[facility]  Gastroenterology  Consult Note    Patient Name: Hamlet Terrell  MRN: [id number]  Admission Date: 11/24/2019  Hospital Length of Stay: 5 days  Code Status: Full Code   Attending Provider: Markus Howard MD   Consulting Provider: Matt Butt MD  Primary Care Physician: Carlin Swift MD  Principal Problem:Acute on chronic combined systolic and diastolic heart failure    Inpatient consult to Gastroenterology  Consult performed by: Matt Butt MD  Consult ordered by: Markus Howard MD        Subjective:     HPI:  Mr. Terrell is 54 years, with medical problem of HFrEF 23% with pulm HTN, permanent AF/AFL s/p CTI with CVA on 2009 on Coumadin. Presented in the hospital with acute on chronic CHF. GI consulted for possible GI source for ongoing anemia. Patient stated he has episode of "spit" clotted blood, he denied hematemesis, abdominal pain, N/V, hematochezia or melena. He also stated he had episodes of nosebleed as well around the same time. Denied NSAID, alcohol, smoking known liver disease, prior hx of GI bleed, trauma, abdominal surgery, history of cancer or family history of cancer. No prior EGD or Colonoscopy.          Past Medical History:   Diagnosis Date    Anticoagulant long-term use     Cardiomegaly     Chronic combined systolic and diastolic congestive heart failure     Coronary artery disease     Heart attack 2006    Hypertension     Hyperthyroidism, subclinical 1/2/2013    MI (myocardial infarction) 9/22/2013    MI in 2009      Paroxysmal atrial fibrillation     PE (pulmonary embolism) 1/1/2013    IN 2010     S/P ablation of atrial flutter 2008    Stroke 2009    no residual weaknesses       Past Surgical History:   Procedure Laterality Date    RADIOFREQUENCY ABLATION  01/08/2008    for atrial flutter       Review of patient's allergies indicates:   Allergen Reactions    Acetaminophen      Itching    Oxycodone-acetaminophen      Other reaction(s): Itching "    Ace inhibitors Other (See Comments)     cough     Family History     Problem Relation (Age of Onset)    Alcohol abuse Father    Hypertension Mother, Father, Sister, Brother    Stroke Mother        Tobacco Use    Smoking status: Never Smoker    Smokeless tobacco: Never Used   Substance and Sexual Activity    Alcohol use: No    Drug use: No    Sexual activity: Never     Review of Systems   Constitutional: Positive for fatigue. Negative for unexpected weight change.   HENT: Negative for trouble swallowing.    Eyes: Negative for pain.   Respiratory: Negative for cough.    Cardiovascular: Positive for leg swelling. Negative for chest pain.   Gastrointestinal: Negative for abdominal pain, anal bleeding, blood in stool, constipation, diarrhea, nausea and vomiting.   Genitourinary: Negative for hematuria.   Skin: Negative for color change.   Neurological: Negative for headaches.   Psychiatric/Behavioral: Negative for agitation.     Objective:     Vital Signs (Most Recent):  Temp: 98 °F (36.7 °C) (11/29/19 0700)  Pulse: 103 (11/29/19 0736)  Resp: 19 (11/29/19 0700)  BP: (!) 129/54 (11/29/19 0700)  SpO2: (!) 92 % (11/29/19 0700) Vital Signs (24h Range):  Temp:  [97.6 °F (36.4 °C)-99.2 °F (37.3 °C)] 98 °F (36.7 °C)  Pulse:  [] 103  Resp:  [18-19] 19  SpO2:  [91 %-97 %] 92 %  BP: (103-135)/(54-62) 129/54     Weight: 124 kg (273 lb 5.9 oz) (11/29/19 0700)  Body mass index is 40.37 kg/m².      Intake/Output Summary (Last 24 hours) at 11/29/2019 1032  Last data filed at 11/29/2019 0800  Gross per 24 hour   Intake 660 ml   Output 2825 ml   Net -2165 ml       Lines/Drains/Airways     Peripheral Intravenous Line                 Peripheral IV - Single Lumen 11/24/19 1355 22 G Right Hand 4 days         Peripheral IV - Single Lumen 11/26/19 1806 20 G Anterior;Proximal;Right Forearm 2 days         Peripheral IV - Single Lumen 11/26/19 1806 20 G Anterior;Right Forearm 2 days                Physical Exam   Constitutional: He  "is oriented to person, place, and time. He appears well-developed and well-nourished. No distress.   HENT:   Head: Atraumatic.   Eyes: Pupils are equal, round, and reactive to light.   Neck: Normal range of motion. Neck supple.   Cardiovascular: Normal rate.   Pulmonary/Chest: Effort normal.   Abdominal: Bowel sounds are normal. He exhibits distension. There is no tenderness.   Musculoskeletal: Normal range of motion. He exhibits edema.   Neurological: He is alert and oriented to person, place, and time.   Skin: Skin is warm.   Psychiatric: He has a normal mood and affect.       Significant Labs:  CBC:   Recent Labs   Lab 11/28/19 0424 11/29/19 0304   WBC 6.83 7.88   HGB 6.2* 6.6*   HCT 21.9* 22.5*    261     BMP:   Recent Labs   Lab 11/29/19 0304         K 3.5   CL 97   CO2 28   BUN 77*   CREATININE 2.9*   CALCIUM 9.4   MG 2.3     CMP:   Recent Labs   Lab 11/28/19 0424 11/29/19 0304   *   < > 102   CALCIUM 9.2   < > 9.4   ALBUMIN 3.3*  --   --    PROT 8.1  --   --       < > 140   K 4.1   < > 3.5   CO2 25   < > 28   CL 98   < > 97   BUN 81*   < > 77*   CREATININE 3.4*   < > 2.9*   ALKPHOS 207*  --   --    ALT 9*  --   --    AST 13  --   --    BILITOT 3.3*  --   --     < > = values in this interval not displayed.     Coagulation:   Recent Labs   Lab 11/29/19 0304   INR 2.7*       Significant Imaging:  Imaging results within the past 24 hours have been reviewed.    Assessment/Plan:     GI bleed  Suspected GI bleed the cause of low Hb    Mr. Terrell is 54 years, with medical problem of HFrEF 23% with pulm HTN, permanent AF/AFL s/p CTI with CVA on 2009 on Coumadin. Presented in the hospital with acute on chronic CHF. GI consulted for possible GI source for ongoing anemia. Patient stated he has episode of "spit" clotted blood, he denied hematemesis, abdominal pain, N/V, hematochezia or melena. He also stated he had episodes of nosebleed as well around the same time. Denied NSAID, " alcohol, smoking known liver disease, prior hx of GI bleed, trauma, abdominal surgery, history of cancer or family history of cancer. No prior EGD or Colonoscopy.   Baseline Hb outside the facility is 8 drop to 6.2, transfuse 2unit pRBC with no good response. 3rd unit is transfusing.   US abdomen 2017 showed evidence if liver cirrhosis. snf high at his baseline. Elevated bilirubin       Plan:   -Place 2 large bore IVs, volume resuscitation per primary  -Transfuse pRBC for Hb < 7 g/dL (Consider a higher Hb target if there is clinical evidence of intravascular volume depletion or comorbidities, such as CAD or if high suspicion of vigorous active ongoing bleeding or an uncorrected coagulopathy exists.).  -Correct coagulopathy (goal plt >50, INR <1.5) if present in patients without absolute contraindications.  -Avoid nonsteroidal agents, antiplatelet agents and anticoagulants if possible in patients without absolute contraindications  - Recommended obtain CT abdomen/ pelvis   - Patient is high risk for procedure giving his cardiac issues. Would plan EGD/Colonoscopy when his HF optimize.   -Please call with any additional questions, concerns or changes in the patient's clinical status.      Abdominal pain  Patient complains of lower abdominal pain, tender to palpation   - Recommended obtain CT abdomen/pelvis     Elevated bilirubin  - Recommended obtain Direct Bili, hemolysis workup, hep panel and hemochromatosis workup.   - If remarkable, consider Hepatology consult           Thank you for your consult. I will follow-up with patient. Please contact us if you have any additional questions.    Matt Butt MD  Gastroenterology  [facility]

## 2019-11-29 NOTE — SUBJECTIVE & OBJECTIVE
Interval History: Continues to diurese, kidney improving, open to transfusion now    Review of Systems   Constitution: Negative for chills, decreased appetite and diaphoresis.   HENT: Negative for congestion and ear discharge.    Eyes: Negative for blurred vision and discharge.   Cardiovascular: Negative for chest pain, dyspnea on exertion, irregular heartbeat, leg swelling and paroxysmal nocturnal dyspnea.   Respiratory: Positive for shortness of breath. Negative for cough and hemoptysis.    Gastrointestinal: Negative for abdominal pain.     Objective:     Vital Signs (Most Recent):  Temp: 98.3 °F (36.8 °C) (11/29/19 1145)  Pulse: (!) 121 (11/29/19 1145)  Resp: 18 (11/29/19 1145)  BP: 117/68 (11/29/19 1145)  SpO2: (!) 94 % (11/29/19 1145) Vital Signs (24h Range):  Temp:  [97.6 °F (36.4 °C)-99.2 °F (37.3 °C)] 98.3 °F (36.8 °C)  Pulse:  [] 121  Resp:  [18-19] 18  SpO2:  [91 %-97 %] 94 %  BP: (103-135)/(54-68) 117/68     Weight: 124 kg (273 lb 5.9 oz)  Body mass index is 40.37 kg/m².     SpO2: (!) 94 %  O2 Device (Oxygen Therapy): room air      Intake/Output Summary (Last 24 hours) at 11/29/2019 1250  Last data filed at 11/29/2019 0800  Gross per 24 hour   Intake 480 ml   Output 2625 ml   Net -2145 ml       Lines/Drains/Airways     Peripheral Intravenous Line                 Peripheral IV - Single Lumen 11/24/19 1355 22 G Right Hand 4 days         Peripheral IV - Single Lumen 11/26/19 1806 20 G Anterior;Proximal;Right Forearm 2 days         Peripheral IV - Single Lumen 11/26/19 1806 20 G Anterior;Right Forearm 2 days                Physical Exam   Constitutional: He is oriented to person, place, and time. He appears well-developed and well-nourished. No distress.   Eyes: Pupils are equal, round, and reactive to light. Conjunctivae are normal.   Neck: JVD present. No tracheal deviation present. No thyromegaly present.   Cardiovascular: Normal rate, regular rhythm, normal heart sounds and intact distal pulses.  Exam reveals no gallop and no friction rub.   No murmur heard.  Pulses:       Radial pulses are 2+ on the right side, and 2+ on the left side.        Femoral pulses are 2+ on the right side, and 2+ on the left side.  Pulmonary/Chest: Effort normal and breath sounds normal. No respiratory distress. He has no wheezes. He has no rales.   Abdominal: Soft. Bowel sounds are normal. He exhibits no distension. There is no tenderness.   Musculoskeletal: He exhibits edema. He exhibits no deformity.   Neurological: He is alert and oriented to person, place, and time. No cranial nerve deficit. Coordination normal.   Skin: Skin is warm and dry. He is not diaphoretic.   Psychiatric: He has a normal mood and affect. His behavior is normal.       Significant Labs:   BMP:   Recent Labs   Lab 11/28/19  0424 11/28/19  1352 11/29/19  0304   * 120* 102    140 140   K 4.1 3.8 3.5   CL 98 98 97   CO2 25 28 28   BUN 81* 77* 77*   CREATININE 3.4* 3.2* 2.9*   CALCIUM 9.2 9.3 9.4   MG 2.4 2.4 2.3       Significant Imaging: Echocardiogram:   Transthoracic echo (TTE) complete (Cupid Only):   Results for orders placed or performed during the hospital encounter of 11/24/19   Echo Color Flow Doppler? Yes; Bubble Contrast? No   Result Value Ref Range    Ascending aorta 3.81 cm    STJ 3.42 cm    IVRT 0.07 msec    IVS 1.11 (A) 0.6 - 1.1 cm    LA size 5.73 cm    Left Atrium Major Axis 7.45 cm    Left Atrium Minor Axis 7.84 cm    LVIDD 5.93 3.5 - 6.0 cm    LVIDS 5.38 (A) 2.1 - 4.0 cm    LVOT diameter 2.38 cm    LVOT peak VTI 18.35 cm    PW 1.49 (A) 0.6 - 1.1 cm    MV Peak E Karri 1.16 m/s    RA Major Axis 7.53 cm    RA Width 5.67 cm    RVDD 5.13 cm    Sinus 3.80 cm    TAPSE 1.38 cm    TR Max Karri 2.50 m/s    TDI LATERAL 0.09 m/s    TDI SEPTAL 0.05 m/s    LA WIDTH 5.12 cm    LV Diastolic Volume 175.36 mL    LV Systolic Volume 140.09 mL    LVOT peak karri 1.01 m/s    LV LATERAL E/E' RATIO 12.89 m/s    LV SEPTAL E/E' RATIO 23.20 m/s    FS 9 %     LA volume 190.52 cm3    LV mass 343.49 g    Left Ventricle Relative Wall Thickness 0.50 cm    Mean e' 0.07 m/s    LVOT area 4.4 cm2    LVOT stroke volume 81.59 cm3    E/E' ratio 16.57 m/s    LV Systolic Volume Index 58.8 mL/m2    LV Diastolic Volume Index 73.58 mL/m2    LA Volume Index 79.9 mL/m2    LV Mass Index 144 g/m2    Triscuspid Valve Regurgitation Peak Gradient 25 mmHg    BSA 2.49 m2    Right Atrial Pressure (from IVC) 15 mmHg    TV rest pulmonary artery pressure 40 mmHg    Narrative    · Severely decreased left ventricular systolic function. The estimated   ejection fraction is 25%  · Concentric left ventricular hypertrophy.  · Global hypokinetic wall motion.  · Moderate right ventricular enlargement.  · Moderately reduced right ventricular systolic function.  · Severe biatrial enlargement.  · Moderate mitral regurgitation.  · Moderate tricuspid regurgitation.  · The estimated PA systolic pressure is 40 mm Hg  · Elevated central venous pressure (15 mm Hg).  · Atrial fibrillation observed.

## 2019-11-29 NOTE — PLAN OF CARE
Patient AAOx4. Calm. Received 1 unit of blood this shift. Plan of care discussed with patient. Dobutamine gtt continued and Lasix gtt continued at the current rate. Patient is free of fall/trauma/injury. Denies CP, stated SOB, and pain/discomfort in his R shoulder. Heating packs given with relief. All questions addressed. Will continue to monitor

## 2019-11-29 NOTE — PROGRESS NOTES
[facility]  Cardiology  Progress Note    Patient Name: Hamlet Terrell  MRN: [id number]  Admission Date: 11/24/2019  Hospital Length of Stay: 5 days  Code Status: Full Code   Attending Physician: Markus Howard MD   Primary Care Physician: Carlin Swift MD  Expected Discharge Date: 12/3/2019  Principal Problem:Acute on chronic combined systolic and diastolic heart failure    Subjective:     Hospital Course:   No notes on file    Interval History: Continues to diurese, kidney improving, open to transfusion now    Review of Systems   Constitution: Negative for chills, decreased appetite and diaphoresis.   HENT: Negative for congestion and ear discharge.    Eyes: Negative for blurred vision and discharge.   Cardiovascular: Negative for chest pain, dyspnea on exertion, irregular heartbeat, leg swelling and paroxysmal nocturnal dyspnea.   Respiratory: Positive for shortness of breath. Negative for cough and hemoptysis.    Gastrointestinal: Negative for abdominal pain.     Objective:     Vital Signs (Most Recent):  Temp: 98.3 °F (36.8 °C) (11/29/19 1145)  Pulse: (!) 121 (11/29/19 1145)  Resp: 18 (11/29/19 1145)  BP: 117/68 (11/29/19 1145)  SpO2: (!) 94 % (11/29/19 1145) Vital Signs (24h Range):  Temp:  [97.6 °F (36.4 °C)-99.2 °F (37.3 °C)] 98.3 °F (36.8 °C)  Pulse:  [] 121  Resp:  [18-19] 18  SpO2:  [91 %-97 %] 94 %  BP: (103-135)/(54-68) 117/68     Weight: 124 kg (273 lb 5.9 oz)  Body mass index is 40.37 kg/m².     SpO2: (!) 94 %  O2 Device (Oxygen Therapy): room air      Intake/Output Summary (Last 24 hours) at 11/29/2019 1250  Last data filed at 11/29/2019 0800  Gross per 24 hour   Intake 480 ml   Output 2625 ml   Net -2145 ml       Lines/Drains/Airways     Peripheral Intravenous Line                 Peripheral IV - Single Lumen 11/24/19 1355 22 G Right Hand 4 days         Peripheral IV - Single Lumen 11/26/19 1806 20 G Anterior;Proximal;Right Forearm 2 days         Peripheral IV - Single  Lumen 11/26/19 1806 20 G Anterior;Right Forearm 2 days                Physical Exam   Constitutional: He is oriented to person, place, and time. He appears well-developed and well-nourished. No distress.   Eyes: Pupils are equal, round, and reactive to light. Conjunctivae are normal.   Neck: JVD present. No tracheal deviation present. No thyromegaly present.   Cardiovascular: Normal rate, regular rhythm, normal heart sounds and intact distal pulses. Exam reveals no gallop and no friction rub.   No murmur heard.  Pulses:       Radial pulses are 2+ on the right side, and 2+ on the left side.        Femoral pulses are 2+ on the right side, and 2+ on the left side.  Pulmonary/Chest: Effort normal and breath sounds normal. No respiratory distress. He has no wheezes. He has no rales.   Abdominal: Soft. Bowel sounds are normal. He exhibits no distension. There is no tenderness.   Musculoskeletal: He exhibits edema. He exhibits no deformity.   Neurological: He is alert and oriented to person, place, and time. No cranial nerve deficit. Coordination normal.   Skin: Skin is warm and dry. He is not diaphoretic.   Psychiatric: He has a normal mood and affect. His behavior is normal.       Significant Labs:   BMP:   Recent Labs   Lab 11/28/19  0424 11/28/19  1352 11/29/19  0304   * 120* 102    140 140   K 4.1 3.8 3.5   CL 98 98 97   CO2 25 28 28   BUN 81* 77* 77*   CREATININE 3.4* 3.2* 2.9*   CALCIUM 9.2 9.3 9.4   MG 2.4 2.4 2.3       Significant Imaging: Echocardiogram:   Transthoracic echo (TTE) complete (Cupid Only):   Results for orders placed or performed during the hospital encounter of 11/24/19   Echo Color Flow Doppler? Yes; Bubble Contrast? No   Result Value Ref Range    Ascending aorta 3.81 cm    STJ 3.42 cm    IVRT 0.07 msec    IVS 1.11 (A) 0.6 - 1.1 cm    LA size 5.73 cm    Left Atrium Major Axis 7.45 cm    Left Atrium Minor Axis 7.84 cm    LVIDD 5.93 3.5 - 6.0 cm    LVIDS 5.38 (A) 2.1 - 4.0 cm    LVOT  diameter 2.38 cm    LVOT peak VTI 18.35 cm    PW 1.49 (A) 0.6 - 1.1 cm    MV Peak E Karri 1.16 m/s    RA Major Axis 7.53 cm    RA Width 5.67 cm    RVDD 5.13 cm    Sinus 3.80 cm    TAPSE 1.38 cm    TR Max Karri 2.50 m/s    TDI LATERAL 0.09 m/s    TDI SEPTAL 0.05 m/s    LA WIDTH 5.12 cm    LV Diastolic Volume 175.36 mL    LV Systolic Volume 140.09 mL    LVOT peak karri 1.01 m/s    LV LATERAL E/E' RATIO 12.89 m/s    LV SEPTAL E/E' RATIO 23.20 m/s    FS 9 %    LA volume 190.52 cm3    LV mass 343.49 g    Left Ventricle Relative Wall Thickness 0.50 cm    Mean e' 0.07 m/s    LVOT area 4.4 cm2    LVOT stroke volume 81.59 cm3    E/E' ratio 16.57 m/s    LV Systolic Volume Index 58.8 mL/m2    LV Diastolic Volume Index 73.58 mL/m2    LA Volume Index 79.9 mL/m2    LV Mass Index 144 g/m2    Triscuspid Valve Regurgitation Peak Gradient 25 mmHg    BSA 2.49 m2    Right Atrial Pressure (from IVC) 15 mmHg    TV rest pulmonary artery pressure 40 mmHg    Narrative    · Severely decreased left ventricular systolic function. The estimated   ejection fraction is 25%  · Concentric left ventricular hypertrophy.  · Global hypokinetic wall motion.  · Moderate right ventricular enlargement.  · Moderately reduced right ventricular systolic function.  · Severe biatrial enlargement.  · Moderate mitral regurgitation.  · Moderate tricuspid regurgitation.  · The estimated PA systolic pressure is 40 mm Hg  · Elevated central venous pressure (15 mm Hg).  · Atrial fibrillation observed.        Assessment and Plan:     Brief HPI: 53 yo EF 25% with repeat admissions for ADHF on  2.5 here (not at home) Lasix drip and diuril. JEAN-PIERRE on CKD    * Acute on chronic combined systolic and diastolic heart failure  - Patient seen and examined  - Cont Lasix @ 20 mg/hr, add Diuril 500 Q12  - Check BMP and Mg Q12  - Strict I/O'S, 1500 CC Fluid restrict  -Encourage compliance with meds  - May need extra lasix push with blood transfusion  - Renal function improved as  congestion clears  - Need to get Hb at least 8, please transfuse  - Had long conversation about repeat hospitalization. When diuresed and more dry may need RHC to decide if able to wean off  and discharge vs may need weekly IV Lasix infusions (Insurance status?)          VTE Risk Mitigation (From admission, onward)         Ordered     warfarin tablet 8 mg  Daily      11/25/19 1246     Reason for No Pharmacological VTE Prophylaxis  Once     Question:  Reasons:  Answer:  Already adequately anticoagulated on oral Anticoagulants    11/24/19 1650     IP VTE HIGH RISK PATIENT  Once      11/24/19 1650     Place sequential compression device  Until discontinued      11/24/19 1650     Place CAMI hose  Until discontinued      11/24/19 1650                Nan Murrell MD  Cardiology  [facility]

## 2019-11-29 NOTE — HPI
"Mr. Terrell is 54 years, with medical problem of HFrEF 23% with pulm HTN, permanent AF/AFL s/p CTI with CVA on 2009 on Coumadin. Presented in the hospital with acute on chronic CHF. GI consulted for possible GI source for ongoing anemia. Patient stated he has episode of "spit" clotted blood, he denied hematemesis, abdominal pain, N/V, hematochezia or melena. He also stated he had episodes of nosebleed as well around the same time. Denied NSAID, alcohol, smoking known liver disease, prior hx of GI bleed, trauma, abdominal surgery, history of cancer or family history of cancer. No prior EGD or Colonoscopy.        "

## 2019-11-29 NOTE — ASSESSMENT & PLAN NOTE
- Patient seen and examined  - Cont Lasix @ 20 mg/hr, add Diuril 500 Q12  - Check BMP and Mg Q12  - Strict I/O'S, 1500 CC Fluid restrict  -Encourage compliance with meds  - May need extra lasix push with blood transfusion  - Need to get Hb at least 8, please transfuse  - Had long conversation about repeat hospitalization. When diuresed and more dry may need RHC to decide if able to wean off  and discharge vs may need weekly IV Lasix infusions (Insurance status?)

## 2019-11-29 NOTE — PLAN OF CARE
POC reviewed with pt. Pt AOX4, VSS-Afib on monitor. HR 's. PM dose of diurel given. Lasix gtts continued  gtts continue. 16 beat run of MD payam notified. Bed in locked/lowest position, call light in reach, will continue to monitor.

## 2019-11-29 NOTE — ASSESSMENT & PLAN NOTE
"Suspected GI bleed the cause of low Hb    Mr. Terrell is 54 years, with medical problem of HFrEF 23% with pulm HTN, permanent AF/AFL s/p CTI with CVA on 2009 on Coumadin. Presented in the hospital with acute on chronic CHF. GI consulted for possible GI source for ongoing anemia. Patient stated he has episode of "spit" clotted blood, he denied hematemesis, abdominal pain, N/V, hematochezia or melena. He also stated he had episodes of nosebleed as well around the same time. Denied NSAID, alcohol, smoking known liver disease, prior hx of GI bleed, trauma, abdominal surgery, history of cancer or family history of cancer. No prior EGD or Colonoscopy.   Baseline Hb outside the facility is 8 drop to 6.2, transfuse one unit pRBC with no good response. 2 unit is transfusing.   US abdomen 2017 showed evidence if liver cirrhosis. shelter high at his baseline. Elevated bilirubin       Plan:   -Place 2 large bore IVs, volume resuscitation per primary  -Transfuse pRBC for Hb < 7 g/dL (Consider a higher Hb target if there is clinical evidence of intravascular volume depletion or comorbidities, such as CAD or if high suspicion of vigorous active ongoing bleeding or an uncorrected coagulopathy exists.).  -Correct coagulopathy (goal plt >50, INR <1.5) if present in patients without absolute contraindications.  -Avoid nonsteroidal agents, antiplatelet agents and anticoagulants if possible in patients without absolute contraindications  - Recommended obtain CT abdomen/ pelvis   - Patient is high risk for procedure giving his cardiac issues. Would plan EGD/Colonoscopy when his HF optimize.   -Please call with any additional questions, concerns or changes in the patient's clinical status.          "

## 2019-11-30 LAB
ANION GAP SERPL CALC-SCNC: 13 MMOL/L (ref 8–16)
ANION GAP SERPL CALC-SCNC: 15 MMOL/L (ref 8–16)
BASOPHILS # BLD AUTO: 0.03 K/UL (ref 0–0.2)
BASOPHILS # BLD AUTO: 0.05 K/UL (ref 0–0.2)
BASOPHILS NFR BLD: 0.4 % (ref 0–1.9)
BASOPHILS NFR BLD: 0.6 % (ref 0–1.9)
BUN SERPL-MCNC: 76 MG/DL (ref 6–20)
BUN SERPL-MCNC: 77 MG/DL (ref 6–20)
CALCIUM SERPL-MCNC: 9.5 MG/DL (ref 8.7–10.5)
CALCIUM SERPL-MCNC: 9.8 MG/DL (ref 8.7–10.5)
CHLORIDE SERPL-SCNC: 94 MMOL/L (ref 95–110)
CHLORIDE SERPL-SCNC: 94 MMOL/L (ref 95–110)
CO2 SERPL-SCNC: 30 MMOL/L (ref 23–29)
CO2 SERPL-SCNC: 33 MMOL/L (ref 23–29)
CREAT SERPL-MCNC: 2.7 MG/DL (ref 0.5–1.4)
CREAT SERPL-MCNC: 2.7 MG/DL (ref 0.5–1.4)
DIFFERENTIAL METHOD: ABNORMAL
DIFFERENTIAL METHOD: ABNORMAL
EOSINOPHIL # BLD AUTO: 0.4 K/UL (ref 0–0.5)
EOSINOPHIL # BLD AUTO: 0.5 K/UL (ref 0–0.5)
EOSINOPHIL NFR BLD: 4.8 % (ref 0–8)
EOSINOPHIL NFR BLD: 6.6 % (ref 0–8)
ERYTHROCYTE [DISTWIDTH] IN BLOOD BY AUTOMATED COUNT: 22.6 % (ref 11.5–14.5)
ERYTHROCYTE [DISTWIDTH] IN BLOOD BY AUTOMATED COUNT: 23.3 % (ref 11.5–14.5)
EST. GFR  (AFRICAN AMERICAN): 29.8 ML/MIN/1.73 M^2
EST. GFR  (AFRICAN AMERICAN): 29.8 ML/MIN/1.73 M^2
EST. GFR  (NON AFRICAN AMERICAN): 25.7 ML/MIN/1.73 M^2
EST. GFR  (NON AFRICAN AMERICAN): 25.7 ML/MIN/1.73 M^2
GLUCOSE SERPL-MCNC: 110 MG/DL (ref 70–110)
GLUCOSE SERPL-MCNC: 122 MG/DL (ref 70–110)
HCT VFR BLD AUTO: 25.7 % (ref 40–54)
HCT VFR BLD AUTO: 25.8 % (ref 40–54)
HGB BLD-MCNC: 7.3 G/DL (ref 14–18)
HGB BLD-MCNC: 7.5 G/DL (ref 14–18)
IMM GRANULOCYTES # BLD AUTO: 0.03 K/UL (ref 0–0.04)
IMM GRANULOCYTES # BLD AUTO: 0.05 K/UL (ref 0–0.04)
IMM GRANULOCYTES NFR BLD AUTO: 0.4 % (ref 0–0.5)
IMM GRANULOCYTES NFR BLD AUTO: 0.6 % (ref 0–0.5)
INR PPP: 2.6 (ref 0.8–1.2)
LYMPHOCYTES # BLD AUTO: 0.7 K/UL (ref 1–4.8)
LYMPHOCYTES # BLD AUTO: 0.8 K/UL (ref 1–4.8)
LYMPHOCYTES NFR BLD: 10.8 % (ref 18–48)
LYMPHOCYTES NFR BLD: 9.3 % (ref 18–48)
MAGNESIUM SERPL-MCNC: 2.1 MG/DL (ref 1.6–2.6)
MAGNESIUM SERPL-MCNC: 2.2 MG/DL (ref 1.6–2.6)
MCH RBC QN AUTO: 22.4 PG (ref 27–31)
MCH RBC QN AUTO: 22.4 PG (ref 27–31)
MCHC RBC AUTO-ENTMCNC: 28.4 G/DL (ref 32–36)
MCHC RBC AUTO-ENTMCNC: 29.1 G/DL (ref 32–36)
MCV RBC AUTO: 77 FL (ref 82–98)
MCV RBC AUTO: 79 FL (ref 82–98)
MONOCYTES # BLD AUTO: 1.3 K/UL (ref 0.3–1)
MONOCYTES # BLD AUTO: 1.5 K/UL (ref 0.3–1)
MONOCYTES NFR BLD: 17.7 % (ref 4–15)
MONOCYTES NFR BLD: 19.5 % (ref 4–15)
NEUTROPHILS # BLD AUTO: 4.3 K/UL (ref 1.8–7.7)
NEUTROPHILS # BLD AUTO: 5.5 K/UL (ref 1.8–7.7)
NEUTROPHILS NFR BLD: 62.3 % (ref 38–73)
NEUTROPHILS NFR BLD: 67 % (ref 38–73)
NRBC BLD-RTO: 0 /100 WBC
NRBC BLD-RTO: 0 /100 WBC
PLATELET # BLD AUTO: 266 K/UL (ref 150–350)
PLATELET # BLD AUTO: 280 K/UL (ref 150–350)
PMV BLD AUTO: 9.4 FL (ref 9.2–12.9)
PMV BLD AUTO: 9.8 FL (ref 9.2–12.9)
POTASSIUM SERPL-SCNC: 3.3 MMOL/L (ref 3.5–5.1)
POTASSIUM SERPL-SCNC: 4.1 MMOL/L (ref 3.5–5.1)
PROTHROMBIN TIME: 25.1 SEC (ref 9–12.5)
RBC # BLD AUTO: 3.26 M/UL (ref 4.6–6.2)
RBC # BLD AUTO: 3.35 M/UL (ref 4.6–6.2)
SODIUM SERPL-SCNC: 139 MMOL/L (ref 136–145)
SODIUM SERPL-SCNC: 140 MMOL/L (ref 136–145)
WBC # BLD AUTO: 6.83 K/UL (ref 3.9–12.7)
WBC # BLD AUTO: 8.2 K/UL (ref 3.9–12.7)

## 2019-11-30 PROCEDURE — 63600175 PHARM REV CODE 636 W HCPCS: Performed by: HOSPITALIST

## 2019-11-30 PROCEDURE — 36415 COLL VENOUS BLD VENIPUNCTURE: CPT

## 2019-11-30 PROCEDURE — 27000221 HC OXYGEN, UP TO 24 HOURS

## 2019-11-30 PROCEDURE — 20600001 HC STEP DOWN PRIVATE ROOM

## 2019-11-30 PROCEDURE — 85025 COMPLETE CBC W/AUTO DIFF WBC: CPT | Mod: 91

## 2019-11-30 PROCEDURE — 25000003 PHARM REV CODE 250: Performed by: HOSPITALIST

## 2019-11-30 PROCEDURE — 83735 ASSAY OF MAGNESIUM: CPT

## 2019-11-30 PROCEDURE — 99233 SBSQ HOSP IP/OBS HIGH 50: CPT | Mod: ,,, | Performed by: HOSPITALIST

## 2019-11-30 PROCEDURE — 85610 PROTHROMBIN TIME: CPT

## 2019-11-30 PROCEDURE — 83735 ASSAY OF MAGNESIUM: CPT | Mod: 91

## 2019-11-30 PROCEDURE — 99233 PR SUBSEQUENT HOSPITAL CARE,LEVL III: ICD-10-PCS | Mod: ,,, | Performed by: HOSPITALIST

## 2019-11-30 PROCEDURE — 80048 BASIC METABOLIC PNL TOTAL CA: CPT

## 2019-11-30 PROCEDURE — C9113 INJ PANTOPRAZOLE SODIUM, VIA: HCPCS | Performed by: HOSPITALIST

## 2019-11-30 PROCEDURE — 80048 BASIC METABOLIC PNL TOTAL CA: CPT | Mod: 91

## 2019-11-30 PROCEDURE — 63600175 PHARM REV CODE 636 W HCPCS: Performed by: STUDENT IN AN ORGANIZED HEALTH CARE EDUCATION/TRAINING PROGRAM

## 2019-11-30 PROCEDURE — 94761 N-INVAS EAR/PLS OXIMETRY MLT: CPT

## 2019-11-30 RX ORDER — FLUTICASONE PROPIONATE 50 MCG
2 SPRAY, SUSPENSION (ML) NASAL DAILY
Status: DISCONTINUED | OUTPATIENT
Start: 2019-11-30 | End: 2019-12-08 | Stop reason: HOSPADM

## 2019-11-30 RX ORDER — POTASSIUM CHLORIDE 20 MEQ/15ML
40 SOLUTION ORAL ONCE
Status: COMPLETED | OUTPATIENT
Start: 2019-11-30 | End: 2019-11-30

## 2019-11-30 RX ORDER — PANTOPRAZOLE SODIUM 40 MG/1
40 TABLET, DELAYED RELEASE ORAL 2 TIMES DAILY
Status: DISCONTINUED | OUTPATIENT
Start: 2019-11-30 | End: 2019-11-30

## 2019-11-30 RX ORDER — ONDANSETRON 2 MG/ML
4 INJECTION INTRAMUSCULAR; INTRAVENOUS EVERY 6 HOURS PRN
Status: DISCONTINUED | OUTPATIENT
Start: 2019-11-30 | End: 2019-12-08 | Stop reason: HOSPADM

## 2019-11-30 RX ORDER — PANTOPRAZOLE SODIUM 40 MG/10ML
40 INJECTION, POWDER, LYOPHILIZED, FOR SOLUTION INTRAVENOUS EVERY 12 HOURS
Status: DISCONTINUED | OUTPATIENT
Start: 2019-11-30 | End: 2019-12-03

## 2019-11-30 RX ORDER — POTASSIUM CHLORIDE 20 MEQ/1
40 TABLET, EXTENDED RELEASE ORAL EVERY 4 HOURS
Status: DISCONTINUED | OUTPATIENT
Start: 2019-11-30 | End: 2019-11-30

## 2019-11-30 RX ORDER — POTASSIUM CHLORIDE 20 MEQ/1
40 TABLET, EXTENDED RELEASE ORAL ONCE
Status: DISCONTINUED | OUTPATIENT
Start: 2019-11-30 | End: 2019-11-30

## 2019-11-30 RX ORDER — POTASSIUM CHLORIDE 7.45 MG/ML
10 INJECTION INTRAVENOUS
Status: COMPLETED | OUTPATIENT
Start: 2019-11-30 | End: 2019-11-30

## 2019-11-30 RX ORDER — POLYETHYLENE GLYCOL 3350, SODIUM SULFATE ANHYDROUS, SODIUM BICARBONATE, SODIUM CHLORIDE, POTASSIUM CHLORIDE 236; 22.74; 6.74; 5.86; 2.97 G/4L; G/4L; G/4L; G/4L; G/4L
4000 POWDER, FOR SOLUTION ORAL ONCE
Status: COMPLETED | OUTPATIENT
Start: 2019-12-01 | End: 2019-12-01

## 2019-11-30 RX ADMIN — POTASSIUM CHLORIDE 10 MEQ: 7.46 INJECTION, SOLUTION INTRAVENOUS at 04:11

## 2019-11-30 RX ADMIN — POTASSIUM CHLORIDE 40 MEQ: 20 SOLUTION ORAL at 01:11

## 2019-11-30 RX ADMIN — POTASSIUM CHLORIDE 40 MEQ: 20 SOLUTION ORAL at 10:11

## 2019-11-30 RX ADMIN — FUROSEMIDE 20 MG/HR: 10 INJECTION, SOLUTION INTRAMUSCULAR; INTRAVENOUS at 01:11

## 2019-11-30 RX ADMIN — FUROSEMIDE 20 MG/HR: 10 INJECTION, SOLUTION INTRAMUSCULAR; INTRAVENOUS at 10:11

## 2019-11-30 RX ADMIN — ONDANSETRON 4 MG: 2 INJECTION INTRAMUSCULAR; INTRAVENOUS at 02:11

## 2019-11-30 RX ADMIN — POTASSIUM CHLORIDE 10 MEQ: 7.46 INJECTION, SOLUTION INTRAVENOUS at 05:11

## 2019-11-30 RX ADMIN — WARFARIN SODIUM 8 MG: 3 TABLET ORAL at 05:11

## 2019-11-30 RX ADMIN — DOBUTAMINE IN DEXTROSE 2.5 MCG/KG/MIN: 200 INJECTION, SOLUTION INTRAVENOUS at 01:11

## 2019-11-30 RX ADMIN — FERROUS SULFATE TAB EC 325 MG (65 MG FE EQUIVALENT) 325 MG: 325 (65 FE) TABLET DELAYED RESPONSE at 01:11

## 2019-11-30 RX ADMIN — HYDRALAZINE HYDROCHLORIDE AND ISOSORBIDE DINITRATE 1 TABLET: 37.5; 2 TABLET, FILM COATED ORAL at 01:11

## 2019-11-30 RX ADMIN — HYDRALAZINE HYDROCHLORIDE AND ISOSORBIDE DINITRATE 1 TABLET: 37.5; 2 TABLET, FILM COATED ORAL at 08:11

## 2019-11-30 RX ADMIN — FERROUS SULFATE TAB EC 325 MG (65 MG FE EQUIVALENT) 325 MG: 325 (65 FE) TABLET DELAYED RESPONSE at 09:11

## 2019-11-30 RX ADMIN — PANTOPRAZOLE SODIUM 40 MG: 40 INJECTION, POWDER, FOR SOLUTION INTRAVENOUS at 08:11

## 2019-11-30 RX ADMIN — PANTOPRAZOLE SODIUM 40 MG: 40 TABLET, DELAYED RELEASE ORAL at 09:11

## 2019-11-30 RX ADMIN — ASPIRIN 81 MG: 81 TABLET, COATED ORAL at 09:11

## 2019-11-30 RX ADMIN — CHLOROTHIAZIDE SODIUM 500 MG: 500 INJECTION, POWDER, LYOPHILIZED, FOR SOLUTION INTRAVENOUS at 09:11

## 2019-11-30 RX ADMIN — GABAPENTIN 100 MG: 250 SOLUTION ORAL at 09:11

## 2019-11-30 RX ADMIN — FERROUS SULFATE TAB EC 325 MG (65 MG FE EQUIVALENT) 325 MG: 325 (65 FE) TABLET DELAYED RESPONSE at 08:11

## 2019-11-30 RX ADMIN — GABAPENTIN 100 MG: 250 SOLUTION ORAL at 08:11

## 2019-11-30 RX ADMIN — HYDRALAZINE HYDROCHLORIDE AND ISOSORBIDE DINITRATE 1 TABLET: 37.5; 2 TABLET, FILM COATED ORAL at 09:11

## 2019-11-30 RX ADMIN — CHLOROTHIAZIDE SODIUM 500 MG: 500 INJECTION, POWDER, LYOPHILIZED, FOR SOLUTION INTRAVENOUS at 08:11

## 2019-11-30 NOTE — PROGRESS NOTES
Notified Anita SORIANO of over a quarter size michael Blood clot Pt spit in cup; Will cont to monitor

## 2019-11-30 NOTE — PROGRESS NOTES
MD Medrano notified of Pt with N/V and runs of vtach. Labs to be drawn early and zofran 4mg IVP ordered.

## 2019-11-30 NOTE — PROGRESS NOTES
[facility] Hospital Medicine  Progress Note    Primary Team: [facility]  Admit Date: 11/24/2019    Subjective:      HPI:   Mr. Terrell is a 54yoM, Cardiology consulted for Cardiogenic shock evaluate for inotropic support. PMHx of HFrEF (EF 23%, moderate MR, moderate TR, diastolic dysfunction), with pulm HTN (PASP 47 mm Hg), Permanent AF/AFL s/p CTI with CVA in 2009 on Coumadin, CAD and obstructive sleep apnea who presented 11/24/19 with 1 week history of worsening SOB / MUÑOZ and 30 lb weight gain. Patient states compliance with fluid, salt and medication,  However since last week having worsening LE edema with fluid weeping of his legs. Associated with MUÑOZ, Fatigue. He does have chronic severe anemia (refusing transfusion) with Hbg 6, additionally CXR demonstrating worsening RLL consolidation vs. Effusion. Case discussed with cardiology in ED, given Lasix IVP with diuril with minimal UOP, today patient having AMS / lethargy, worsening renal function with elevated BNP, Tbili trending up, with soft BP SBP 90s, requiring supplemental oxygen. Cardiology consulted for Inotropic support, on telemetry patient having NSVT with permanent Afib.He also has severe anemia with a hemoglobin of 6.2 which is consistent with his hemoglobin obtained on last admit on 10/23/2019.  The patient refuses blood products.  Chest x-ray was done which showed increase lobulated oval opacity in the right mid lung field which is a new finding compared to chest x-ray done on 10/23/2019.  BNP was done and was significant for a value of 210.  The patient was initially given a mg of Lasix IV with minimal response and this was followed by 120 mg IV per Cardiology.    ECHO 11/17/2019  · Severely decreased left ventricular systolic function. The estimated ejection fraction is 25%  · Concentric left ventricular hypertrophy.  · Global hypokinetic wall motion.  · Moderate right ventricular enlargement.  · Moderately reduced right  ventricular systolic function.  · Severe biatrial enlargement.  · Moderate mitral regurgitation.  · Moderate tricuspid regurgitation.  · The estimated PA systolic pressure is 40 mm Hg  · Elevated central venous pressure (15 mm Hg).  · Atrial fibrillation observed.    Hospital course:  Admitted  for ADHF on IV Lasix. Weight increasing with UOP minimal and Cr worsening. Patient seen by nephrology and cardiology for assistance. Persistent symptoms as above, with additional lethargy without change in mental status. Patient was started on IV  on 11/26 as well as lasix gtt. Diuril augmentation added. Patient with persistent anemia despite blood transfusions. GI was consulted with concern for occult bleeding. Patient denies active bleeding and refuses rectal examination.     Interval history:   Patient with improving UOP, -3 L. Lasix gtt continues to 20 mg/hr with diuril augmentation. Nephrology and cardiology following.    ROS:  As per HPI  General: no fever, no chills, no weight loss, no fatigue  Cardiovascular: no chest pain  Respiratory: no cough, no wheezes  All other systems reviewed & are negative.     Past Medical History:   Diagnosis Date    Anticoagulant long-term use     Cardiomegaly     Chronic combined systolic and diastolic congestive heart failure     Coronary artery disease     Heart attack 2006    Hypertension     Hyperthyroidism, subclinical 1/2/2013    MI (myocardial infarction) 9/22/2013    MI in 2009      Paroxysmal atrial fibrillation     PE (pulmonary embolism) 1/1/2013    IN 2010     S/P ablation of atrial flutter 2008    Stroke 2009    no residual weaknesses     Past Surgical History:   Procedure Laterality Date    RADIOFREQUENCY ABLATION  01/08/2008    for atrial flutter     Social History     Socioeconomic History    Marital status: Single     Spouse name: Not on file    Number of children: Not on file    Years of education: Not on file    Highest education level: Not on file    Occupational History    Not on file   Social Needs    Financial resource strain: Not on file    Food insecurity:     Worry: Not on file     Inability: Not on file    Transportation needs:     Medical: Not on file     Non-medical: Not on file   Tobacco Use    Smoking status: Never Smoker    Smokeless tobacco: Never Used   Substance and Sexual Activity    Alcohol use: No    Drug use: No    Sexual activity: Never   Lifestyle    Physical activity:     Days per week: Not on file     Minutes per session: Not on file    Stress: Not on file   Relationships    Social connections:     Talks on phone: Not on file     Gets together: Not on file     Attends Jehovah's witness service: Not on file     Active member of club or organization: Not on file     Attends meetings of clubs or organizations: Not on file     Relationship status: Not on file   Other Topics Concern    Not on file   Social History Narrative    Not on file         Objective:   Last 24 Hour Vital Signs:  BP  Min: 98/54  Max: 132/60  Temp  Av °F (36.7 °C)  Min: 97.3 °F (36.3 °C)  Max: 98.7 °F (37.1 °C)  Pulse  Av.2  Min: 84  Max: 121  Resp  Av.7  Min: 16  Max: 18  SpO2  Av.6 %  Min: 92 %  Max: 97 %  I/O last 3 completed shifts:  In: 1700 [P.O.:1700]  Out: 5425 [Urine:5425]    Physical Examination:  GEN: AAOx3, NAD, appears mildly short of breath  HEENT: NCAT, MMM, PERRL, EOMI, oropharynx clear +JVD with HJS  CV: RRR, no m/r, no S3/S4, warm, well perfused  RESP: CTAB, no wheezes/crackles, no increased WOB  ABD: soft, NTND, normoactive BS, no organomegaly  EXTR: no c/c, intact distal pulses x 4, BLE edema  NEURO: PERRL, EOMI, moving all four extremities, intact sensation to light touch, no focal deficits  SKIN: no rashes, lesions, or color changes  PSYCH: normal affect    Laboratory:  I have reviewed all pertinent lab results/findings within the past 24 hours.    Radiology:  I have reviewed all pertinent imaging results/findings within  the past 24 hours.    Current Medications:     Infusions:   DOBUTamine 2.5 mcg/kg/min (11/30/19 0151)    furosemide (LASIX) 2 mg/mL infusion (non-titrating) 20 mg/hr (11/30/19 0151)        Scheduled:   aspirin  81 mg Oral Daily    chlorothiazide (DIURIL) IVPB  500 mg Intravenous Q12H    ferrous sulfate  325 mg Oral TID    gabapentin  100 mg Oral Q12H    isosorbide-hydrALAZINE 20-37.5 mg  1 tablet Oral TID    mirtazapine  7.5 mg Oral QHS    pantoprazole  40 mg Oral Daily    polyethylene glycol  17 g Oral BID    potassium chloride SA  40 mEq Oral Q4H    senna-docusate 8.6-50 mg  1 tablet Oral BID    warfarin  8 mg Oral Daily        PRN:  sodium chloride, sodium chloride, sodium chloride, calcium carbonate, diphenhydrAMINE, melatonin, methyl salicylate-menthol 15-10%, ondansetron, promethazine (PHENERGAN) IVPB, sodium chloride 0.9%, traMADol    Prior records reviewed.    Assessment/Plan:     Hamlet Terrell is a 54 y.o.male with    Acute on chronic combined systolic and diastolic heart failure  Initial poor response to 80 mg of Lasix and patient given Lasix 120 mg IV and Diuril 500 mg IV on admission per Cardiology.  EF 23%.  Added inotropic support with  2.5 mcg/kg/min  Monitor closely for arrhythmia with repletion K and Mg to 4.0/2.0 respectively  C/w lasix to 20 mg/hr  C/w diuril  Continue Bidil as tolerated  Hold lopressor    Abnormal CT  Abnormal opacity in middle lobe of right lung  Non-smoker  Obtain CT of the chest once more euvolemic (could not lay flat)     JEAN-PIERRE on CKD3  Cr 1.9 > 2.3 > 3.2 > 3.7 > 3.2 >2.9 (baseline 1.6-1.7)  Suspect cardiorenal  Monitor on diuresis  Continue to monitor electrolytes   Nephrology consulted, appreciate recommendations    Essential hypertension  Adjust antiHTNsives as above     Atrial fibrillation, chronic  Chronic anticoagulation  INR 2.6, therapeutic  Daily INR's  HR controlled. Hold metoprolol with  on board     Iron deficiency anemia  Patient with LANDON,  initially refused blood tranfusions  Patient now agrees to blood transfusion - transfused 3U pRBC  Increase iron dosage  Rectal examination refused by patient only visual examination without obvious hemorrhoid  GI consult given c/f occult bleeding with inadequate response to blood transfusion  Check RUQ sono     History of CVA (cerebrovascular accident)  No acute management     Chronic respiratory failure  1.5 L NC at home  Reports malfunctioning O2 tank at home - consult CM     DVT PPx: coumadin  Diet: Low sodium with 1200 cc fluid restriction  FULL CODE    Markus Howard MD  Hospital Medicine Staff  [facility]

## 2019-11-30 NOTE — CARE UPDATE
Rapid Response Nurse Chart Check     Chart check completed, abnormal VS noted. Please call [phone] for further concerns or assistance.

## 2019-11-30 NOTE — PLAN OF CARE
Pt AAO and VSS. Pt with  and Lasix gtts. Pt diuresing well. Pt with bilateral shoulder pain relieved after tramadol and one time oxycodone 5mg. Pt educated on fall risk overnight,pt remained free from falls/trauma/injury. Denies chest pain, SOB, palpitations, dizziness, pain, or discomfort. Plan of care reviewed with pt, all questions answered. Bed locked in lowest position, call bell within reach, no acute distress noted, will continue to monitor.

## 2019-11-30 NOTE — PROGRESS NOTES
MD Medrano notified of Pt K:3.3 and Pt still with N/V. Phenergan IV ordered and replacement IV KCl 10 mEq ordered x2. Will continue to monitor.

## 2019-11-30 NOTE — TREATMENT PLAN
GI Treatment Plan    Plan for EGD / Colonoscopy on Monday 12/2  - Clear liquids tomorrow  - Prep tomorrow evening  - NPO prior to procedure  - Prefer INR <2.5    Thank you for involving us in the care of Hamlet Terrell. We will continue to follow. Please call with any additional questions, concerns or changes in the patient's clinical status.      Conrad Sequeira MD  Gastroenterology Fellow, PGY4  [facility]

## 2019-11-30 NOTE — PLAN OF CARE
Pt infusing with  and diuresing well with Lasix; Pt will be clear liquids starting @ midnight tonight NPO @ midnight Monday for EGD and colonoscopy Monday; K replaced today. O2 WNL with 2L NC. Pt remains free from injury/falls for shift. Educated Pt on meds no questions at this time. VSS.  No complaints of CP, SOB, pain/discomfort  Bed locked in lowest position, call bell within reach. All questions addressed.  Will continue to monitor.

## 2019-12-01 LAB
ANION GAP SERPL CALC-SCNC: 12 MMOL/L (ref 8–16)
ANION GAP SERPL CALC-SCNC: 17 MMOL/L (ref 8–16)
BASOPHILS # BLD AUTO: 0.05 K/UL (ref 0–0.2)
BASOPHILS NFR BLD: 0.7 % (ref 0–1.9)
BUN SERPL-MCNC: 74 MG/DL (ref 6–20)
BUN SERPL-MCNC: 76 MG/DL (ref 6–20)
CALCIUM SERPL-MCNC: 9.7 MG/DL (ref 8.7–10.5)
CALCIUM SERPL-MCNC: 9.8 MG/DL (ref 8.7–10.5)
CHLORIDE SERPL-SCNC: 91 MMOL/L (ref 95–110)
CHLORIDE SERPL-SCNC: 94 MMOL/L (ref 95–110)
CO2 SERPL-SCNC: 29 MMOL/L (ref 23–29)
CO2 SERPL-SCNC: 35 MMOL/L (ref 23–29)
CREAT SERPL-MCNC: 2.7 MG/DL (ref 0.5–1.4)
CREAT SERPL-MCNC: 2.8 MG/DL (ref 0.5–1.4)
DIFFERENTIAL METHOD: ABNORMAL
EOSINOPHIL # BLD AUTO: 0.6 K/UL (ref 0–0.5)
EOSINOPHIL NFR BLD: 8.1 % (ref 0–8)
ERYTHROCYTE [DISTWIDTH] IN BLOOD BY AUTOMATED COUNT: 23.5 % (ref 11.5–14.5)
EST. GFR  (AFRICAN AMERICAN): 28.5 ML/MIN/1.73 M^2
EST. GFR  (AFRICAN AMERICAN): 29.8 ML/MIN/1.73 M^2
EST. GFR  (NON AFRICAN AMERICAN): 24.6 ML/MIN/1.73 M^2
EST. GFR  (NON AFRICAN AMERICAN): 25.7 ML/MIN/1.73 M^2
GLUCOSE SERPL-MCNC: 101 MG/DL (ref 70–110)
GLUCOSE SERPL-MCNC: 136 MG/DL (ref 70–110)
HCT VFR BLD AUTO: 26.1 % (ref 40–54)
HGB BLD-MCNC: 7.3 G/DL (ref 14–18)
IMM GRANULOCYTES # BLD AUTO: 0.03 K/UL (ref 0–0.04)
IMM GRANULOCYTES NFR BLD AUTO: 0.4 % (ref 0–0.5)
INR PPP: 3 (ref 0.8–1.2)
LYMPHOCYTES # BLD AUTO: 0.7 K/UL (ref 1–4.8)
LYMPHOCYTES NFR BLD: 10.2 % (ref 18–48)
MAGNESIUM SERPL-MCNC: 2.2 MG/DL (ref 1.6–2.6)
MAGNESIUM SERPL-MCNC: 2.3 MG/DL (ref 1.6–2.6)
MCH RBC QN AUTO: 22.3 PG (ref 27–31)
MCHC RBC AUTO-ENTMCNC: 28 G/DL (ref 32–36)
MCV RBC AUTO: 80 FL (ref 82–98)
MONOCYTES # BLD AUTO: 1.4 K/UL (ref 0.3–1)
MONOCYTES NFR BLD: 21.2 % (ref 4–15)
NEUTROPHILS # BLD AUTO: 4 K/UL (ref 1.8–7.7)
NEUTROPHILS NFR BLD: 59.4 % (ref 38–73)
NRBC BLD-RTO: 0 /100 WBC
PLATELET # BLD AUTO: 273 K/UL (ref 150–350)
PMV BLD AUTO: 10.2 FL (ref 9.2–12.9)
POTASSIUM SERPL-SCNC: 3.6 MMOL/L (ref 3.5–5.1)
POTASSIUM SERPL-SCNC: 4 MMOL/L (ref 3.5–5.1)
PROTHROMBIN TIME: 28.8 SEC (ref 9–12.5)
RBC # BLD AUTO: 3.28 M/UL (ref 4.6–6.2)
SODIUM SERPL-SCNC: 138 MMOL/L (ref 136–145)
SODIUM SERPL-SCNC: 140 MMOL/L (ref 136–145)
WBC # BLD AUTO: 6.75 K/UL (ref 3.9–12.7)

## 2019-12-01 PROCEDURE — 36415 COLL VENOUS BLD VENIPUNCTURE: CPT

## 2019-12-01 PROCEDURE — 20600001 HC STEP DOWN PRIVATE ROOM

## 2019-12-01 PROCEDURE — 25000003 PHARM REV CODE 250: Performed by: FAMILY MEDICINE

## 2019-12-01 PROCEDURE — C9113 INJ PANTOPRAZOLE SODIUM, VIA: HCPCS | Performed by: HOSPITALIST

## 2019-12-01 PROCEDURE — 63600175 PHARM REV CODE 636 W HCPCS: Performed by: STUDENT IN AN ORGANIZED HEALTH CARE EDUCATION/TRAINING PROGRAM

## 2019-12-01 PROCEDURE — 99233 SBSQ HOSP IP/OBS HIGH 50: CPT | Mod: ,,, | Performed by: HOSPITALIST

## 2019-12-01 PROCEDURE — 85610 PROTHROMBIN TIME: CPT

## 2019-12-01 PROCEDURE — 80048 BASIC METABOLIC PNL TOTAL CA: CPT | Mod: 91

## 2019-12-01 PROCEDURE — 25000003 PHARM REV CODE 250: Performed by: HOSPITALIST

## 2019-12-01 PROCEDURE — 99233 PR SUBSEQUENT HOSPITAL CARE,LEVL III: ICD-10-PCS | Mod: ,,, | Performed by: HOSPITALIST

## 2019-12-01 PROCEDURE — 83735 ASSAY OF MAGNESIUM: CPT

## 2019-12-01 PROCEDURE — 85025 COMPLETE CBC W/AUTO DIFF WBC: CPT

## 2019-12-01 PROCEDURE — 80048 BASIC METABOLIC PNL TOTAL CA: CPT

## 2019-12-01 PROCEDURE — 63600175 PHARM REV CODE 636 W HCPCS: Performed by: HOSPITALIST

## 2019-12-01 PROCEDURE — 94761 N-INVAS EAR/PLS OXIMETRY MLT: CPT

## 2019-12-01 PROCEDURE — 25000242 PHARM REV CODE 250 ALT 637 W/ HCPCS: Performed by: HOSPITALIST

## 2019-12-01 PROCEDURE — 25000003 PHARM REV CODE 250: Performed by: INTERNAL MEDICINE

## 2019-12-01 PROCEDURE — 83735 ASSAY OF MAGNESIUM: CPT | Mod: 91

## 2019-12-01 RX ORDER — DOBUTAMINE HYDROCHLORIDE 400 MG/100ML
2.5 INJECTION, SOLUTION INTRAVENOUS CONTINUOUS
Status: DISCONTINUED | OUTPATIENT
Start: 2019-12-01 | End: 2019-12-06

## 2019-12-01 RX ORDER — POTASSIUM CHLORIDE 20 MEQ/1
40 TABLET, EXTENDED RELEASE ORAL ONCE
Status: DISCONTINUED | OUTPATIENT
Start: 2019-12-02 | End: 2019-12-01

## 2019-12-01 RX ORDER — POTASSIUM CHLORIDE 20 MEQ/15ML
40 SOLUTION ORAL ONCE
Status: COMPLETED | OUTPATIENT
Start: 2019-12-01 | End: 2019-12-01

## 2019-12-01 RX ADMIN — HYDRALAZINE HYDROCHLORIDE AND ISOSORBIDE DINITRATE 1 TABLET: 37.5; 2 TABLET, FILM COATED ORAL at 09:12

## 2019-12-01 RX ADMIN — PANTOPRAZOLE SODIUM 40 MG: 40 INJECTION, POWDER, FOR SOLUTION INTRAVENOUS at 09:12

## 2019-12-01 RX ADMIN — TRAMADOL HYDROCHLORIDE 50 MG: 50 TABLET, FILM COATED ORAL at 09:12

## 2019-12-01 RX ADMIN — DOBUTAMINE IN DEXTROSE 2.5 MCG/KG/MIN: 200 INJECTION, SOLUTION INTRAVENOUS at 04:12

## 2019-12-01 RX ADMIN — POTASSIUM CHLORIDE 40 MEQ: 20 SOLUTION ORAL at 11:12

## 2019-12-01 RX ADMIN — POLYETHYLENE GLYCOL 3350, SODIUM SULFATE ANHYDROUS, SODIUM BICARBONATE, SODIUM CHLORIDE, POTASSIUM CHLORIDE 4000 ML: 236; 22.74; 6.74; 5.86; 2.97 POWDER, FOR SOLUTION ORAL at 04:12

## 2019-12-01 RX ADMIN — FERROUS SULFATE TAB EC 325 MG (65 MG FE EQUIVALENT) 325 MG: 325 (65 FE) TABLET DELAYED RESPONSE at 04:12

## 2019-12-01 RX ADMIN — FERROUS SULFATE TAB EC 325 MG (65 MG FE EQUIVALENT) 325 MG: 325 (65 FE) TABLET DELAYED RESPONSE at 09:12

## 2019-12-01 RX ADMIN — FUROSEMIDE 20 MG/HR: 10 INJECTION, SOLUTION INTRAMUSCULAR; INTRAVENOUS at 12:12

## 2019-12-01 RX ADMIN — CHLOROTHIAZIDE SODIUM 500 MG: 500 INJECTION, POWDER, LYOPHILIZED, FOR SOLUTION INTRAVENOUS at 09:12

## 2019-12-01 RX ADMIN — HYDRALAZINE HYDROCHLORIDE AND ISOSORBIDE DINITRATE 1 TABLET: 37.5; 2 TABLET, FILM COATED ORAL at 04:12

## 2019-12-01 RX ADMIN — TRAMADOL HYDROCHLORIDE 50 MG: 50 TABLET, FILM COATED ORAL at 12:12

## 2019-12-01 RX ADMIN — ASPIRIN 81 MG: 81 TABLET, COATED ORAL at 09:12

## 2019-12-01 RX ADMIN — GABAPENTIN 100 MG: 250 SOLUTION ORAL at 09:12

## 2019-12-01 RX ADMIN — DOBUTAMINE HYDROCHLORIDE 2.5 MCG/KG/MIN: 200 INJECTION INTRAVENOUS at 02:12

## 2019-12-01 RX ADMIN — FLUTICASONE PROPIONATE 100 MCG: 50 SPRAY, METERED NASAL at 09:12

## 2019-12-01 NOTE — PROGRESS NOTES
[facility] Hospital Medicine  Progress Note    Primary Team: [facility] MED C  Admit Date: 11/24/2019    Subjective:      HPI:   Mr. Terrell is a 54M HFrEF (EF 23%, moderate MR, moderate TR, diastolic dysfunction; multiple admissions in 2019, no ICD), with pulm HTN (PASP 47 mm Hg), Permanent AF/AFL s/p CTI with CVA in 2009 on Coumadin, CAD and obstructive sleep apnea who presented 11/24/19 with 1 week history of worsening SOB / MUÑOZ and 30 lb weight gain. Patient states compliance with fluid, salt and medication,  However since last week having worsening LE edema with fluid weeping of his legs. Associated with MUÑOZ, Fatigue. He does have chronic severe anemia (refusing transfusion) with Hbg 6, additionally CXR demonstrating worsening RLL consolidation vs. Effusion. Case discussed with cardiology in ED, given Lasix IVP with diuril with minimal UOP, today patient having AMS / lethargy, worsening renal function with elevated BNP, Tbili trending up, with soft BP SBP 90s, requiring supplemental oxygen. Cardiology consulted for Inotropic support, on telemetry patient having NSVT with permanent Afib.He also has severe anemia with a hemoglobin of 6.2 which is consistent with his hemoglobin obtained on last admit on 10/23/2019.  The patient initially refuses blood products due to risk of transmission of infection.  Chest x-ray was done which showed increase lobulated oval opacity in the right mid lung field which is a new finding compared to chest x-ray done on 10/23/2019.  BNP was done and was significant for a value of 210.  The patient was initially given a mg of Lasix IV with minimal response and this was followed by 120 mg IV per Cardiology.    ECHO 11/17/2019  · Severely decreased left ventricular systolic function. The estimated ejection fraction is 25%  · Concentric left ventricular hypertrophy.  · Global hypokinetic wall motion.  · Moderate right ventricular enlargement.  · Moderately reduced right  ventricular systolic function.  · Severe biatrial enlargement.  · Moderate mitral regurgitation.  · Moderate tricuspid regurgitation.  · The estimated PA systolic pressure is 40 mm Hg  · Elevated central venous pressure (15 mm Hg).  · Atrial fibrillation observed.    Hospital course:  Admitted  for ADHF on IV Lasix. Weight increasing with UOP minimal and Cr worsening. Patient seen by nephrology and cardiology for assistance. Persistent symptoms as above, with additional lethargy without change in mental status. Patient was started on IV  on 11/26 as well as lasix gtt. Diuril augmentation added. Patient with persistent anemia despite blood transfusions. GI was consulted with concern for occult bleeding. Patient denies active bleeding and refuses rectal examination.  The patient's counts remain above 7 after 3U pRBC. Patient planned for endoscopic evaluation. The patient has had sustained runs of VT so cardiology was consulted but feel this is actually more c/w AF with RVR and thus  is continued.     Interval history:   Patient with somewhat worsened UOP. Lasix gtt continues to 20 mg/hr with diuril augmentation. Nephrology and cardiology following.    ROS:  As per HPI  General: no fever, no chills, no weight loss, no fatigue  Cardiovascular: no chest pain  Respiratory: no cough, no wheezes  All other systems reviewed & are negative.     Past Medical History:   Diagnosis Date    Anticoagulant long-term use     Cardiomegaly     Chronic combined systolic and diastolic congestive heart failure     Coronary artery disease     Heart attack 2006    Hypertension     Hyperthyroidism, subclinical 1/2/2013    MI (myocardial infarction) 9/22/2013    MI in 2009      Paroxysmal atrial fibrillation     PE (pulmonary embolism) 1/1/2013    IN 2010     S/P ablation of atrial flutter 2008    Stroke 2009    no residual weaknesses     Past Surgical History:   Procedure Laterality Date    RADIOFREQUENCY ABLATION   2008    for atrial flutter     Social History     Socioeconomic History    Marital status: Single     Spouse name: Not on file    Number of children: Not on file    Years of education: Not on file    Highest education level: Not on file   Occupational History    Not on file   Social Needs    Financial resource strain: Not on file    Food insecurity:     Worry: Not on file     Inability: Not on file    Transportation needs:     Medical: Not on file     Non-medical: Not on file   Tobacco Use    Smoking status: Never Smoker    Smokeless tobacco: Never Used   Substance and Sexual Activity    Alcohol use: No    Drug use: No    Sexual activity: Never   Lifestyle    Physical activity:     Days per week: Not on file     Minutes per session: Not on file    Stress: Not on file   Relationships    Social connections:     Talks on phone: Not on file     Gets together: Not on file     Attends Latter-day service: Not on file     Active member of club or organization: Not on file     Attends meetings of clubs or organizations: Not on file     Relationship status: Not on file   Other Topics Concern    Not on file   Social History Narrative    Not on file         Objective:   Last 24 Hour Vital Signs:  BP  Min: 107/59  Max: 130/78  Temp  Av.8 °F (36.6 °C)  Min: 97.3 °F (36.3 °C)  Max: 98.4 °F (36.9 °C)  Pulse  Av.8  Min: 84  Max: 132  Resp  Av.7  Min: 18  Max: 20  SpO2  Av.8 %  Min: 92 %  Max: 96 %  I/O last 3 completed shifts:  In: 2160 [P.O.:2160]  Out: 3525 [Urine:3525]    Physical Examination:  GEN: AAOx3, NAD, appears mildly short of breath  HEENT: NCAT, MMM, PERRL, EOMI, oropharynx clear +JVD with HJS  CV: RRR, no m/r, no S3/S4, warm, well perfused  RESP: CTAB, no wheezes/crackles, no increased WOB  ABD: soft, NTND, normoactive BS, no organomegaly  EXTR: no c/c, intact distal pulses x 4, BLE edema  NEURO: PERRL, EOMI, moving all four extremities, intact sensation to light touch, no  focal deficits  SKIN: no rashes, lesions, or color changes  PSYCH: normal affect    Laboratory:  I have reviewed all pertinent lab results/findings within the past 24 hours.    Radiology:  I have reviewed all pertinent imaging results/findings within the past 24 hours.    Current Medications:     Infusions:   DOBUTamine 2.5 mcg/kg/min (12/01/19 2427)    furosemide (LASIX) 2 mg/mL infusion (non-titrating) 20 mg/hr (11/30/19 4010)        Scheduled:   aspirin  81 mg Oral Daily    chlorothiazide (DIURIL) IVPB  500 mg Intravenous Q12H    ferrous sulfate  325 mg Oral TID    fluticasone propionate  2 spray Each Nostril Daily    gabapentin  100 mg Oral Q12H    isosorbide-hydrALAZINE 20-37.5 mg  1 tablet Oral TID    mirtazapine  7.5 mg Oral QHS    pantoprozole (PROTONIX) IV  40 mg Intravenous Q12H    polyethylene glycol  4,000 mL Oral Once    polyethylene glycol  17 g Oral BID    senna-docusate 8.6-50 mg  1 tablet Oral BID        PRN:  sodium chloride, sodium chloride, sodium chloride, calcium carbonate, diphenhydrAMINE, melatonin, methyl salicylate-menthol 15-10%, ondansetron, promethazine (PHENERGAN) IVPB, sodium chloride 0.9%, traMADol    Prior records reviewed.    Assessment/Plan:     Hamlet Terrell is a 54 y.o.male with    Acute on chronic combined systolic and diastolic heart failure  Initial poor response to 80 mg of Lasix and patient given Lasix 120 mg IV and Diuril 500 mg IV on admission per Cardiology.  EF 23%.  Added inotropic support with  2.5 mcg/kg/min  Monitor closely for arrhythmia with repletion K and Mg to 4.0/2.0 respectively  C/w lasix to 20 mg/hr  C/w diuril  Continue Bidil as tolerated  Hold lopressor    Abnormal CT  Abnormal opacity in middle lobe of right lung  Non-smoker  Obtain CT of the chest once more euvolemic (could not lay flat)     JEAN-PIERRE on CKD3  Cr 1.9 > 2.3 > 3.2 > 3.7 > 3.2 >2.8 (baseline 1.6-1.7)  Suspect cardiorenal  Monitor on diuresis  Continue to monitor  electrolytes   Nephrology consulted, appreciate recommendations    Essential hypertension  Adjust antiHTNsives as above     Atrial fibrillation, chronic  Chronic anticoagulation  INR 3.0, therapeutic  Hold coumadin due to bleeding and plan for endoscopic evaluation  Daily INR's  HR controlled. Hold metoprolol with  on board     Iron deficiency anemia  Patient with LANDON, initially refused blood tranfusions  Patient now agrees to blood transfusion - transfused 3U pRBC  Increase iron dosage  Rectal examination refused by patient only visual examination without obvious hemorrhoid  GI consult given c/f occult bleeding with inadequate response to blood transfusion  Check RUQ sono > no splenomegaly     History of CVA (cerebrovascular accident)  No acute management     Chronic respiratory failure  1.5 L NC at home  Reports malfunctioning O2 tank at home - consult CM     DVT PPx: coumadin (when feasible)  Diet: CLD, NPOpMN  FULL CODE    Markus Howard MD  Hospital Medicine Staff  [facility]

## 2019-12-01 NOTE — PLAN OF CARE
Problem: Adult Inpatient Plan of Care  Goal: Plan of Care Review  Outcome: Ongoing (interventions implemented as appropriate)  Plan of care discussed with patient.     LOC: AAO x 4  SKIN: Skin is warm and dry.   RESPIRATORY: Respirations are spontaneous, even and unlabored. Normal effort and rate noted.  CARDIAC: Controlled AF, HR in the 90's to low 100's.   ABDOMEN: Soft and non tender to palpation. No distention noted.  URINARY: uses urinal at bedside for accurate I/O's.   EXTREMITIES: 3+ edema on bi-lower ext.    NEURO: Pt is able to follow commands. Speech is clear and eye movement spontaneous.  LDA'S: PIV  GTT: Lasix and Dobutamine cont per MAR.   POC: Patient is free of fall/trauma/injury. Denies CP, SOB, or pain/discomfort. All questions addressed. Will continue to monitor.

## 2019-12-01 NOTE — PLAN OF CARE
Pt  and diuresing well with Lasix; Pt will be NPO tonight for EGD and colonoscopy tomorrow; O2 WNL. US performed today to view liver; No significant findings. Pt remains free from injury/falls for shift. Educated Pt on meds no questions at this time. VSS.  No complaints of CP, SOB, pain/discomfort  Bed locked in lowest position, call bell within reach. All questions addressed.  Will continue to monitor.

## 2019-12-02 ENCOUNTER — ANESTHESIA (OUTPATIENT)
Dept: ENDOSCOPY | Facility: HOSPITAL | Age: 54
DRG: 291 | End: 2019-12-02
Payer: MEDICAID

## 2019-12-02 ENCOUNTER — ANESTHESIA EVENT (OUTPATIENT)
Dept: ENDOSCOPY | Facility: HOSPITAL | Age: 54
DRG: 291 | End: 2019-12-02
Payer: MEDICAID

## 2019-12-02 LAB
ANION GAP SERPL CALC-SCNC: 17 MMOL/L (ref 8–16)
BASOPHILS # BLD AUTO: 0.05 K/UL (ref 0–0.2)
BASOPHILS NFR BLD: 0.8 % (ref 0–1.9)
BUN SERPL-MCNC: 75 MG/DL (ref 6–20)
CALCIUM SERPL-MCNC: 9.7 MG/DL (ref 8.7–10.5)
CHLORIDE SERPL-SCNC: 90 MMOL/L (ref 95–110)
CO2 SERPL-SCNC: 30 MMOL/L (ref 23–29)
CREAT SERPL-MCNC: 2.6 MG/DL (ref 0.5–1.4)
DIFFERENTIAL METHOD: ABNORMAL
EOSINOPHIL # BLD AUTO: 0.5 K/UL (ref 0–0.5)
EOSINOPHIL NFR BLD: 7.5 % (ref 0–8)
ERYTHROCYTE [DISTWIDTH] IN BLOOD BY AUTOMATED COUNT: 24.1 % (ref 11.5–14.5)
EST. GFR  (AFRICAN AMERICAN): 31.2 ML/MIN/1.73 M^2
EST. GFR  (NON AFRICAN AMERICAN): 26.9 ML/MIN/1.73 M^2
GLUCOSE SERPL-MCNC: 98 MG/DL (ref 70–110)
HAV IGM SERPL QL IA: NEGATIVE
HBV CORE IGM SERPL QL IA: NEGATIVE
HBV SURFACE AG SERPL QL IA: NEGATIVE
HCT VFR BLD AUTO: 24.9 % (ref 40–54)
HCV AB SERPL QL IA: NEGATIVE
HGB BLD-MCNC: 7.1 G/DL (ref 14–18)
IMM GRANULOCYTES # BLD AUTO: 0.02 K/UL (ref 0–0.04)
IMM GRANULOCYTES NFR BLD AUTO: 0.3 % (ref 0–0.5)
INR PPP: 3.3 (ref 0.8–1.2)
LYMPHOCYTES # BLD AUTO: 0.7 K/UL (ref 1–4.8)
LYMPHOCYTES NFR BLD: 10.8 % (ref 18–48)
MAGNESIUM SERPL-MCNC: 2.2 MG/DL (ref 1.6–2.6)
MCH RBC QN AUTO: 22.7 PG (ref 27–31)
MCHC RBC AUTO-ENTMCNC: 28.5 G/DL (ref 32–36)
MCV RBC AUTO: 80 FL (ref 82–98)
MONOCYTES # BLD AUTO: 1.3 K/UL (ref 0.3–1)
MONOCYTES NFR BLD: 20 % (ref 4–15)
NEUTROPHILS # BLD AUTO: 3.9 K/UL (ref 1.8–7.7)
NEUTROPHILS NFR BLD: 60.6 % (ref 38–73)
NRBC BLD-RTO: 0 /100 WBC
PLATELET # BLD AUTO: 268 K/UL (ref 150–350)
PMV BLD AUTO: 10.1 FL (ref 9.2–12.9)
POCT GLUCOSE: 131 MG/DL (ref 70–110)
POTASSIUM SERPL-SCNC: 3.8 MMOL/L (ref 3.5–5.1)
PROTHROMBIN TIME: 32.1 SEC (ref 9–12.5)
RBC # BLD AUTO: 3.13 M/UL (ref 4.6–6.2)
SODIUM SERPL-SCNC: 137 MMOL/L (ref 136–145)
TROPONIN I SERPL DL<=0.01 NG/ML-MCNC: 0.15 NG/ML (ref 0–0.03)
TROPONIN I SERPL DL<=0.01 NG/ML-MCNC: 0.16 NG/ML (ref 0–0.03)
WBC # BLD AUTO: 6.39 K/UL (ref 3.9–12.7)

## 2019-12-02 PROCEDURE — D9220A PRA ANESTHESIA: ICD-10-PCS | Mod: CRNA,,, | Performed by: NURSE ANESTHETIST, CERTIFIED REGISTERED

## 2019-12-02 PROCEDURE — 63600175 PHARM REV CODE 636 W HCPCS: Performed by: FAMILY MEDICINE

## 2019-12-02 PROCEDURE — 85025 COMPLETE CBC W/AUTO DIFF WBC: CPT

## 2019-12-02 PROCEDURE — 37000009 HC ANESTHESIA EA ADD 15 MINS: Performed by: INTERNAL MEDICINE

## 2019-12-02 PROCEDURE — 45378 DIAGNOSTIC COLONOSCOPY: CPT | Mod: ,,, | Performed by: INTERNAL MEDICINE

## 2019-12-02 PROCEDURE — 63600175 PHARM REV CODE 636 W HCPCS: Performed by: HOSPITALIST

## 2019-12-02 PROCEDURE — 93005 ELECTROCARDIOGRAM TRACING: CPT

## 2019-12-02 PROCEDURE — 99233 PR SUBSEQUENT HOSPITAL CARE,LEVL III: ICD-10-PCS | Mod: ,,, | Performed by: HOSPITALIST

## 2019-12-02 PROCEDURE — 84484 ASSAY OF TROPONIN QUANT: CPT | Mod: 91

## 2019-12-02 PROCEDURE — 45378 DIAGNOSTIC COLONOSCOPY: CPT | Performed by: INTERNAL MEDICINE

## 2019-12-02 PROCEDURE — 43235 PR EGD, FLEX, DIAGNOSTIC: ICD-10-PCS | Mod: 51,,, | Performed by: INTERNAL MEDICINE

## 2019-12-02 PROCEDURE — 20600001 HC STEP DOWN PRIVATE ROOM

## 2019-12-02 PROCEDURE — 84484 ASSAY OF TROPONIN QUANT: CPT

## 2019-12-02 PROCEDURE — 43235 EGD DIAGNOSTIC BRUSH WASH: CPT | Mod: 51,,, | Performed by: INTERNAL MEDICINE

## 2019-12-02 PROCEDURE — 43235 EGD DIAGNOSTIC BRUSH WASH: CPT | Performed by: INTERNAL MEDICINE

## 2019-12-02 PROCEDURE — P9017 PLASMA 1 DONOR FRZ W/IN 8 HR: HCPCS

## 2019-12-02 PROCEDURE — 45378 PR COLONOSCOPY,DIAGNOSTIC: ICD-10-PCS | Mod: ,,, | Performed by: INTERNAL MEDICINE

## 2019-12-02 PROCEDURE — 36415 COLL VENOUS BLD VENIPUNCTURE: CPT

## 2019-12-02 PROCEDURE — 85610 PROTHROMBIN TIME: CPT

## 2019-12-02 PROCEDURE — 25000003 PHARM REV CODE 250: Performed by: NURSE ANESTHETIST, CERTIFIED REGISTERED

## 2019-12-02 PROCEDURE — 25000003 PHARM REV CODE 250: Performed by: HOSPITALIST

## 2019-12-02 PROCEDURE — D9220A PRA ANESTHESIA: ICD-10-PCS | Mod: ANES,,, | Performed by: ANESTHESIOLOGY

## 2019-12-02 PROCEDURE — 80074 ACUTE HEPATITIS PANEL: CPT

## 2019-12-02 PROCEDURE — 37000008 HC ANESTHESIA 1ST 15 MINUTES: Performed by: INTERNAL MEDICINE

## 2019-12-02 PROCEDURE — 93010 EKG 12-LEAD: ICD-10-PCS | Mod: ,,, | Performed by: INTERNAL MEDICINE

## 2019-12-02 PROCEDURE — 80048 BASIC METABOLIC PNL TOTAL CA: CPT

## 2019-12-02 PROCEDURE — 63600175 PHARM REV CODE 636 W HCPCS: Performed by: NURSE ANESTHETIST, CERTIFIED REGISTERED

## 2019-12-02 PROCEDURE — 99232 PR SUBSEQUENT HOSPITAL CARE,LEVL II: ICD-10-PCS | Mod: ,,, | Performed by: INTERNAL MEDICINE

## 2019-12-02 PROCEDURE — D9220A PRA ANESTHESIA: Mod: ANES,,, | Performed by: ANESTHESIOLOGY

## 2019-12-02 PROCEDURE — 94761 N-INVAS EAR/PLS OXIMETRY MLT: CPT

## 2019-12-02 PROCEDURE — 99232 SBSQ HOSP IP/OBS MODERATE 35: CPT | Mod: ,,, | Performed by: INTERNAL MEDICINE

## 2019-12-02 PROCEDURE — 99233 SBSQ HOSP IP/OBS HIGH 50: CPT | Mod: ,,, | Performed by: HOSPITALIST

## 2019-12-02 PROCEDURE — 83735 ASSAY OF MAGNESIUM: CPT

## 2019-12-02 PROCEDURE — D9220A PRA ANESTHESIA: Mod: CRNA,,, | Performed by: NURSE ANESTHETIST, CERTIFIED REGISTERED

## 2019-12-02 PROCEDURE — 93010 ELECTROCARDIOGRAM REPORT: CPT | Mod: ,,, | Performed by: INTERNAL MEDICINE

## 2019-12-02 PROCEDURE — 97530 THERAPEUTIC ACTIVITIES: CPT

## 2019-12-02 RX ORDER — SODIUM CHLORIDE 9 MG/ML
INJECTION, SOLUTION INTRAVENOUS CONTINUOUS PRN
Status: DISCONTINUED | OUTPATIENT
Start: 2019-12-02 | End: 2019-12-02

## 2019-12-02 RX ORDER — PROPOFOL 10 MG/ML
VIAL (ML) INTRAVENOUS
Status: DISCONTINUED | OUTPATIENT
Start: 2019-12-02 | End: 2019-12-02

## 2019-12-02 RX ORDER — LIDOCAINE HCL/PF 100 MG/5ML
SYRINGE (ML) INTRAVENOUS
Status: DISCONTINUED | OUTPATIENT
Start: 2019-12-02 | End: 2019-12-02

## 2019-12-02 RX ORDER — HYDROCODONE BITARTRATE AND ACETAMINOPHEN 500; 5 MG/1; MG/1
TABLET ORAL
Status: DISCONTINUED | OUTPATIENT
Start: 2019-12-02 | End: 2019-12-08 | Stop reason: HOSPADM

## 2019-12-02 RX ORDER — PROPOFOL 10 MG/ML
VIAL (ML) INTRAVENOUS CONTINUOUS PRN
Status: DISCONTINUED | OUTPATIENT
Start: 2019-12-02 | End: 2019-12-02

## 2019-12-02 RX ORDER — NITROGLYCERIN 0.4 MG/1
0.4 TABLET SUBLINGUAL ONCE
Status: COMPLETED | OUTPATIENT
Start: 2019-12-02 | End: 2019-12-02

## 2019-12-02 RX ORDER — GLYCOPYRROLATE 0.2 MG/ML
INJECTION INTRAMUSCULAR; INTRAVENOUS
Status: DISCONTINUED | OUTPATIENT
Start: 2019-12-02 | End: 2019-12-02

## 2019-12-02 RX ORDER — ETOMIDATE 2 MG/ML
INJECTION INTRAVENOUS
Status: DISCONTINUED | OUTPATIENT
Start: 2019-12-02 | End: 2019-12-02

## 2019-12-02 RX ADMIN — ETOMIDATE 2 MG: 2 INJECTION, SOLUTION INTRAVENOUS at 02:12

## 2019-12-02 RX ADMIN — PROPOFOL 20 MG: 10 INJECTION, EMULSION INTRAVENOUS at 02:12

## 2019-12-02 RX ADMIN — LIDOCAINE HYDROCHLORIDE 100 MG: 20 INJECTION, SOLUTION INTRAVENOUS at 02:12

## 2019-12-02 RX ADMIN — NITROGLYCERIN 0.4 MG: 0.4 TABLET SUBLINGUAL at 12:12

## 2019-12-02 RX ADMIN — FUROSEMIDE 20 MG/HR: 10 INJECTION, SOLUTION INTRAMUSCULAR; INTRAVENOUS at 04:12

## 2019-12-02 RX ADMIN — MIRTAZAPINE 7.5 MG: 7.5 TABLET ORAL at 09:12

## 2019-12-02 RX ADMIN — ETOMIDATE 10 MG: 2 INJECTION, SOLUTION INTRAVENOUS at 02:12

## 2019-12-02 RX ADMIN — SODIUM CHLORIDE: 0.9 INJECTION, SOLUTION INTRAVENOUS at 02:12

## 2019-12-02 RX ADMIN — TRAMADOL HYDROCHLORIDE 50 MG: 50 TABLET, FILM COATED ORAL at 10:12

## 2019-12-02 RX ADMIN — GABAPENTIN 100 MG: 250 SOLUTION ORAL at 09:12

## 2019-12-02 RX ADMIN — FUROSEMIDE 20 MG/HR: 10 INJECTION, SOLUTION INTRAMUSCULAR; INTRAVENOUS at 10:12

## 2019-12-02 RX ADMIN — FERROUS SULFATE TAB EC 325 MG (65 MG FE EQUIVALENT) 325 MG: 325 (65 FE) TABLET DELAYED RESPONSE at 09:12

## 2019-12-02 RX ADMIN — GLYCOPYRROLATE 0.2 MG: 0.2 INJECTION, SOLUTION INTRAMUSCULAR; INTRAVENOUS at 02:12

## 2019-12-02 RX ADMIN — DOBUTAMINE HYDROCHLORIDE 2.5 MCG/KG/MIN: 200 INJECTION INTRAVENOUS at 01:12

## 2019-12-02 RX ADMIN — PROPOFOL 100 MCG/KG/MIN: 10 INJECTION, EMULSION INTRAVENOUS at 02:12

## 2019-12-02 NOTE — SUBJECTIVE & OBJECTIVE
Interval History:   Maintaining good UOP with net negative of 1.4 L, satting in RA.    Review of patient's allergies indicates:   Allergen Reactions    Acetaminophen      Itching    Oxycodone-acetaminophen      Other reaction(s): Itching    Ace inhibitors Other (See Comments)     cough     Current Facility-Administered Medications   Medication Frequency    0.9%  NaCl infusion (for blood administration) Q24H PRN    0.9%  NaCl infusion (for blood administration) Q24H PRN    0.9%  NaCl infusion (for blood administration) Q24H PRN    0.9%  NaCl infusion (for blood administration) Q24H PRN    aspirin EC tablet 81 mg Daily    calcium carbonate 200 mg calcium (500 mg) chewable tablet 500 mg TID PRN    chlorothiazide (DIURIL) 500 mg in dextrose 5 % 50 mL IVPB Q12H    diphenhydrAMINE capsule 25 mg Q6H PRN    DOBUTamine 500mg in D5W 250mL infusion (premix) (NON-TITRATING) Continuous    ferrous sulfate EC tablet 325 mg TID    fluticasone propionate 50 mcg/actuation nasal spray 100 mcg Daily    furosemide (LASIX) 2 mg/mL in sodium chloride 0.9% 100 mL infusion (conc: 2 mg/mL) Continuous    gabapentin 250 mg/5 mL (5 mL) solution 100 mg Q12H    isosorbide-hydrALAZINE 20-37.5 mg per tablet 1 tablet TID    melatonin tablet 6 mg Nightly PRN    methyl salicylate-menthol 15-10% cream QID PRN    mirtazapine tablet 7.5 mg QHS    ondansetron injection 4 mg Q6H PRN    pantoprazole injection 40 mg Q12H    promethazine (PHENERGAN) 12.5 mg in dextrose 5 % 50 mL IVPB Q6H PRN    senna-docusate 8.6-50 mg per tablet 1 tablet BID    sodium chloride 0.9% flush 10 mL PRN    traMADol tablet 50 mg Q6H PRN     Facility-Administered Medications Ordered in Other Encounters   Medication Frequency    glycopyrrolate injection PRN       Objective:     Vital Signs (Most Recent):  Temp: 97.9 °F (36.6 °C) (12/02/19 1045)  Pulse: 100 (12/02/19 1323)  Resp: 18 (12/02/19 1323)  BP: (!) 141/77 (12/02/19 1323)  SpO2: (!) 94 % (12/02/19  1323)  O2 Device (Oxygen Therapy): room air (12/02/19 1331) Vital Signs (24h Range):  Temp:  [97.5 °F (36.4 °C)-98.4 °F (36.9 °C)] 97.9 °F (36.6 °C)  Pulse:  [] 100  Resp:  [14-18] 18  SpO2:  [90 %-96 %] 94 %  BP: (112-146)/(56-79) 141/77     Weight: (pt refuse to take his weight this morning ) (12/02/19 0730)  Body mass index is 40.37 kg/m².  Body surface area is 2.46 meters squared.    I/O last 3 completed shifts:  In: 1560 [P.O.:1560]  Out: 2765 [Urine:2765]    Physical Exam   Constitutional: He is oriented to person, place, and time. He appears well-developed and well-nourished.   HENT:   Head: Normocephalic and atraumatic.   Eyes: Pupils are equal, round, and reactive to light. EOM are normal.   Neck: Normal range of motion. JVD present. No thyromegaly present.   Cardiovascular: Normal rate, regular rhythm and normal heart sounds.   No murmur heard.  Pulmonary/Chest: Effort normal and breath sounds normal. He has no wheezes. He has no rales.   Abdominal: Bowel sounds are normal. There is no tenderness.   Musculoskeletal: He exhibits edema.   Neurological: He is alert and oriented to person, place, and time. No cranial nerve deficit.   Psychiatric: He has a normal mood and affect. Judgment normal.       Significant Labs:  All labs within the past 24 hours have been reviewed.

## 2019-12-02 NOTE — PROVATION PATIENT INSTRUCTIONS
Discharge Summary/Instructions after an Endoscopic Procedure  Patient Name: Hamlet Terrell  Patient MRN: [id number]  Patient YOB: 1965  Monday, December 02, 2019  Scott Rosario MD  RESTRICTIONS:  During your procedure today, you received medications for sedation.  These   medications may affect your judgment, balance and coordination.  Therefore,   for 24 hours, you have the following restrictions:   - DO NOT drive a car, operate machinery, make legal/financial decisions,   sign important papers or drink alcohol.    ACTIVITY:  Today: no heavy lifting, straining or running due to procedural   sedation/anesthesia.  The following day: return to full activity including work.  DIET:  Eat and drink normally unless instructed otherwise.     TREATMENT FOR COMMON SIDE EFFECTS:  - Mild abdominal pain, nausea, belching, bloating or excessive gas:  rest,   eat lightly and use a heating pad.  - Sore Throat: treat with throat lozenges and/or gargle with warm salt   water.  - Because air was used during the procedure, expelling large amounts of air   from your rectum or belching is normal.  - If a bowel prep was taken, you may not have a bowel movement for 1-3 days.    This is normal.  SYMPTOMS TO WATCH FOR AND REPORT TO YOUR PHYSICIAN:  1. Abdominal pain or bloating, other than gas cramps.  2. Chest pain.  3. Back pain.  4. Signs of infection such as: chills or fever occurring within 24 hours   after the procedure.  5. Rectal bleeding, which would show as bright red, maroon, or black stools.   (A tablespoon of blood from the rectum is not serious, especially if   hemorrhoids are present.)  6. Vomiting.  7. Weakness or dizziness.  GO DIRECTLY TO THE NEAREST EMERGENCY ROOM IF YOU HAVE ANY OF THE FOLLOWING:      Difficulty breathing              Chills and/or fever over 101 F   Persistent vomiting and/or vomiting blood   Severe abdominal pain   Severe chest pain   Black, tarry stools   Bleeding- more than one  tablespoon   Any other symptom or condition that you feel may need urgent attention  Your doctor recommends these additional instructions:  If any biopsies were taken, your doctors clinic will contact you in 1 to 2   weeks with any results.  - Patient has a contact number available for emergencies.  The signs and   symptoms of potential delayed complications were discussed with the   patient.  Return to normal activities tomorrow.  Written discharge   instructions were provided to the patient.   - Discharge patient to home.   - Resume previous diet.   - Continue present medications.   For questions, problems or results please call your physician - Scott Rosario MD at Work:  [phone].  [facility], EMERGENCY ROOM PHONE NUMBER: [phone]  IF A COMPLICATION OR EMERGENCY SITUATION ARISES AND YOU ARE UNABLE TO REACH   YOUR PHYSICIAN - GO DIRECTLY TO THE EMERGENCY ROOM.  Scott Rosario MD  12/2/2019 3:44:35 PM  This report has been verified and signed electronically.  [employer]ATION

## 2019-12-02 NOTE — PROGRESS NOTES
Patient educated on drinking golytely for GI test series.  He stated he understands the importance of drinking the solution.  Will continue to monitor.

## 2019-12-02 NOTE — SIGNIFICANT EVENT
Results for DIANN BARRERA ZULMA (MRN [id number]) as of 12/2/2019 03:59   Ref. Range 12/2/2019 00:56   Troponin I Latest Ref Range: 0.000 - 0.026 ng/mL 0.161 (H)   Dr Medrano notified. New orders for additional tropin labs. Will continue to monitor.

## 2019-12-02 NOTE — PT/OT/SLP PROGRESS
Physical Therapy  Missed Visit    Patient Name:  Hamlet Terrell   MRN:  [id number]  Admit Date: 11/24/2019  Admitting Diagnosis:  Acute on chronic combined systolic and diastolic heart failure   Length of Stay: 8 days  Recent Surgery: Procedure(s) (LRB):  EGD (ESOPHAGOGASTRODUODENOSCOPY) (N/A)  COLONOSCOPY (N/A) Day of Surgery    Patient not seen today secondary to patient refusal (Attempted x2 in am, pt fatigued after OT visit. Attempted in PM, pt of the MARSHALL at EGD). Will follow-up as POC allows.    Dali Estrella PT, DPT  12/2/2019  Pager: [phone]

## 2019-12-02 NOTE — NURSING TRANSFER
Nursing Transfer Note      12/2/2019     Transfer To: endo    Transfer via stretcher    Transfer with cardiac monitoring    Transported by transport    Medicines sent: Yes- Lasix//FFP    Chart send with patient: Yes

## 2019-12-02 NOTE — PLAN OF CARE
Goals reviewed and remain appropriate.   Pat Christianson OTR/L  Occupational Therapy  Pager #: [phone]  12/2/2019    Problem: Occupational Therapy Goal  Goal: Occupational Therapy Goal  Description  Goals to be met by 12/12/19:    Patient will increase functional independence with ADLs by performing:    UE Dressing with Set-up Assistance.  LE Dressing with Stand-by Assistance.  Grooming while standing with Stand-by Assistance.  Toileting from toilet with Stand-by Assistance for hygiene and clothing management.   Toilet transfer to toilet with Stand-by Assistance.     Outcome: Ongoing, Progressing

## 2019-12-02 NOTE — TRANSFER OF CARE
"Anesthesia Transfer of Care Note    Patient: Hamlet Terrell    Procedure(s) Performed: Procedure(s) (LRB):  EGD (ESOPHAGOGASTRODUODENOSCOPY) (N/A)  COLONOSCOPY (N/A)    Patient location: [facility]    Anesthesia Type: general    Transport from OR: Transported from OR on 6-10 L/min O2 by face mask with adequate spontaneous ventilation    Post pain: adequate analgesia    Post assessment: no apparent anesthetic complications and tolerated procedure well    Post vital signs: stable    Level of consciousness: sedated and responds to stimulation    Nausea/Vomiting: no nausea/vomiting    Complications: none    Transfer of care protocol was followed      Last vitals:   Visit Vitals  BP (!) 141/77 (BP Location: Left arm, Patient Position: Lying)   Pulse 100   Temp 36.6 °C (97.9 °F)   Resp 18   Ht 5' 9" (1.753 m)   Wt 124 kg (273 lb 5.9 oz)   SpO2 (!) 94%   BMI 40.37 kg/m²     "

## 2019-12-02 NOTE — PLAN OF CARE
Plan of care discussed with patient. Refused am meds until no longer NPO.  drip continued, Lasix and FFP held due to loss of IV access and patient refusing new IV. EGD/Colonoscopy today. Patient is free of fall or injury. Afib and VT runs continue, team aware. Denies CP, SOB, or pain. All questions addressed; will continue to monitor.

## 2019-12-02 NOTE — PROVATION PATIENT INSTRUCTIONS
Discharge Summary/Instructions after an Endoscopic Procedure  Patient Name: Hamlet Terrell  Patient MRN: [id number]  Patient YOB: 1965  Monday, December 02, 2019  Scott Rosario MD  RESTRICTIONS:  During your procedure today, you received medications for sedation.  These   medications may affect your judgment, balance and coordination.  Therefore,   for 24 hours, you have the following restrictions:   - DO NOT drive a car, operate machinery, make legal/financial decisions,   sign important papers or drink alcohol.    ACTIVITY:  Today: no heavy lifting, straining or running due to procedural   sedation/anesthesia.  The following day: return to full activity including work.  DIET:  Eat and drink normally unless instructed otherwise.     TREATMENT FOR COMMON SIDE EFFECTS:  - Mild abdominal pain, nausea, belching, bloating or excessive gas:  rest,   eat lightly and use a heating pad.  - Sore Throat: treat with throat lozenges and/or gargle with warm salt   water.  - Because air was used during the procedure, expelling large amounts of air   from your rectum or belching is normal.  - If a bowel prep was taken, you may not have a bowel movement for 1-3 days.    This is normal.  SYMPTOMS TO WATCH FOR AND REPORT TO YOUR PHYSICIAN:  1. Abdominal pain or bloating, other than gas cramps.  2. Chest pain.  3. Back pain.  4. Signs of infection such as: chills or fever occurring within 24 hours   after the procedure.  5. Rectal bleeding, which would show as bright red, maroon, or black stools.   (A tablespoon of blood from the rectum is not serious, especially if   hemorrhoids are present.)  6. Vomiting.  7. Weakness or dizziness.  GO DIRECTLY TO THE NEAREST EMERGENCY ROOM IF YOU HAVE ANY OF THE FOLLOWING:      Difficulty breathing              Chills and/or fever over 101 F   Persistent vomiting and/or vomiting blood   Severe abdominal pain   Severe chest pain   Black, tarry stools   Bleeding- more than one  tablespoon   Any other symptom or condition that you feel may need urgent attention  Your doctor recommends these additional instructions:  If any biopsies were taken, your doctors clinic will contact you in 1 to 2   weeks with any results.  - [facility] patient to [facility] for [facility] care.   - Resume previous diet.   - Continue present medications.   For questions, problems or results please call your physician - Scott Rosario MD at Work:  [phone].  [facility], EMERGENCY ROOM PHONE NUMBER: [phone]  IF A COMPLICATION OR EMERGENCY SITUATION ARISES AND YOU ARE UNABLE TO REACH   YOUR PHYSICIAN - GO DIRECTLY TO THE EMERGENCY ROOM.  Scott Rosario MD  12/2/2019 3:02:53 PM  This report has been verified and signed electronically.  PROVATION

## 2019-12-02 NOTE — PT/OT/SLP PROGRESS
"Occupational Therapy   Treatment    Name: Hamlet Terrell  MRN: [id number]  Admitting Diagnosis:  Acute on chronic combined systolic and diastolic heart failure  Day of Surgery    Recommendations:     Discharge Recommendations: nursing facility, skilled  Discharge Equipment Recommendations:  (TBD)  Barriers to discharge:  Decreased caregiver support    Assessment:     Hamlet Terrell is a 54 y.o. male with a medical diagnosis of Acute on chronic combined systolic and diastolic heart failure. He presents with the following performance deficits affecting function: weakness, impaired endurance, impaired self care skills, impaired functional mobilty, impaired cardiopulmonary response to activity. Patient with improved participation and motivation to perform OOB activity this session. Patient completed sit <> stand and ambulation with SBA but limited by SOB/fatigue. At this time, patient will continue to benefit from acute skilled therapy intervention to address deficits/underlying impairments and progress towards prior level of function. Due to very limited social support, patient would benefit from continued skilled therapy at SNF after discharge to progress more towards independence in ADLs and functional mobility before going home.    Rehab Prognosis:  Good; patient would benefit from acute skilled OT services to address these deficits and reach maximum level of function.       Plan:     Patient to be seen 3 x/week to address the above listed problems via self-care/home management, therapeutic activities, therapeutic exercises  · Plan of Care Expires: 12/27/19  · Plan of Care Reviewed with: patient    Subjective     Patient stated "I'm tired and wore out".    Pain/Comfort:  · Pain Rating 1: 0/10    Objective:     Communicated with: RN prior to session. Patient found up in chair with telemetry, peripheral IV upon OT entry to room.    General Precautions: Standard, fall   Orthopedic Precautions:N/A   Braces: N/A "     Occupational Performance:     Bed Mobility:    · Not assessed; patient up in chair at start of session and returned to chair at end of session    Functional Mobility/Transfers:  · Patient completed Sit <> Stand Transfer 3x from bedside chair with stand by assistance with no assistive device   · Functional Mobility: Patient ambulated ~100 feet with IV pole for support with SBA. Patient presented with SOB following ambulation requiring rest period with cues for pers lip breathing. SPO2 93% and .    Activities of Daily Living:  · Toileting: independence using urinal and min assist for hygiene following BM      AMPAC 6 Click ADL: 17    Treatment & Education:  --Therapist provided facilitation and instruction of proper body mechanics, energy conservation, and fall prevention strategies during tasks listed above.  --Instructed patient to sit in bedside chair daily to increase OOB/activity tolerance.   --Educated patient on OT POC and answered all questions within OT scope of practice.  --Whiteboard updated    Patient left up in chair with all lines intact, call button in reach and RN notifiedEducation:      GOALS:   Multidisciplinary Problems     Occupational Therapy Goals        Problem: Occupational Therapy Goal    Goal Priority Disciplines Outcome Interventions   Occupational Therapy Goal     OT, PT/OT Ongoing, Progressing    Description:  Goals to be met by 12/12/19:    Patient will increase functional independence with ADLs by performing:    UE Dressing with Set-up Assistance.  LE Dressing with Stand-by Assistance.  Grooming while standing with Stand-by Assistance.  Toileting from toilet with Stand-by Assistance for hygiene and clothing management.   Toilet transfer to toilet with Stand-by Assistance.                      Time Tracking:     OT Date of Treatment: 12/02/19  OT Start Time: 0925  OT Stop Time: 0942  OT Total Time (min): 17 min    Billable Minutes:Therapeutic Activity 17    Pat Christianson  OT  12/2/2019

## 2019-12-02 NOTE — PROGRESS NOTES
[facility] Hospital Medicine  Progress Note    Primary Team: [facility] HOSP MED C  Admit Date: 11/24/2019    Subjective:      HPI:   Mr. Terrell is a 54M HFrEF (EF 23%, moderate MR, moderate TR, diastolic dysfunction; multiple admissions in 2019, no ICD), with pulm HTN (PASP 47 mm Hg), Permanent AF/AFL s/p CTI with CVA in 2009 on Coumadin, CAD and obstructive sleep apnea who presented 11/24/19 with 1 week history of worsening SOB / MUÑOZ and 30 lb weight gain. Patient states compliance with fluid, salt and medication, However since last week having worsening LE edema with fluid weeping of his legs. Associated with MUÑOZ, Fatigue. He does have chronic severe anemia (refusing transfusion) with Hbg 6, additionally CXR demonstrating worsening RLL consolidation vs. Effusion. Case discussed with cardiology in ED, given Lasix IVP with diuril with minimal UOP, today patient having AMS / lethargy, worsening renal function with elevated BNP, Tbili trending up, with soft BP SBP 90s, requiring supplemental oxygen. Cardiology consulted for Inotropic support, on telemetry patient having NSVT with permanent Afib.He also has severe anemia with a hemoglobin of 6.2 which is consistent with his hemoglobin obtained on last admit on 10/23/2019.  The patient initially refuses blood products due to risk of transmission of infection.  Chest x-ray was done which showed increase lobulated oval opacity in the right mid lung field which is a new finding compared to chest x-ray done on 10/23/2019.  BNP was done and was significant for a value of 210.  The patient was initially given a mg of Lasix IV with minimal response and this was followed by 120 mg IV per Cardiology.    ECHO 11/17/2019  · Severely decreased left ventricular systolic function. The estimated ejection fraction is 25%  · Concentric left ventricular hypertrophy.  · Global hypokinetic wall motion.  · Moderate right ventricular enlargement.  · Moderately reduced right  ventricular systolic function.  · Severe biatrial enlargement.  · Moderate mitral regurgitation.  · Moderate tricuspid regurgitation.  · The estimated PA systolic pressure is 40 mm Hg  · Elevated central venous pressure (15 mm Hg).  · Atrial fibrillation observed.    Hospital course:  Admitted  for ADHF on IV Lasix. Weight increasing with UOP minimal and Cr worsening. Patient seen by nephrology and cardiology for assistance. Persistent symptoms as above, with additional lethargy without change in mental status. Patient was started on IV  on 11/26 as well as lasix gtt. Diuril augmentation added. Patient with persistent anemia despite blood transfusions. GI was consulted with concern for occult bleeding. Patient denies active bleeding and refuses rectal examination.  The patient's counts remain above 7 after 3U pRBC. Patient planned for endoscopic evaluation. The patient has had sustained runs of VT so cardiology was consulted but feel this is actually more c/w AF with RVR and thus  is continued.     Interval history:   Patient with improved UOP. Weight is not documented. Lasix gtt continues to 20 mg/hr with diuril augmentation. Nephrology and cardiology following. Renal function improves.    ROS:  As per HPI  General: no fever, no chills, no weight loss, no fatigue  Cardiovascular: no chest pain  Respiratory: no cough, no wheezes  All other systems reviewed & are negative.     Past Medical History:   Diagnosis Date    Anticoagulant long-term use     Cardiomegaly     Chronic combined systolic and diastolic congestive heart failure     Coronary artery disease     Heart attack 2006    Hypertension     Hyperthyroidism, subclinical 1/2/2013    MI (myocardial infarction) 9/22/2013    MI in 2009      Paroxysmal atrial fibrillation     PE (pulmonary embolism) 1/1/2013    IN 2010     S/P ablation of atrial flutter 2008    Stroke 2009    no residual weaknesses     Past Surgical History:   Procedure Laterality  Date    RADIOFREQUENCY ABLATION  2008    for atrial flutter     Social History     Socioeconomic History    Marital status: Single     Spouse name: Not on file    Number of children: Not on file    Years of education: Not on file    Highest education level: Not on file   Occupational History    Not on file   Social Needs    Financial resource strain: Not on file    Food insecurity:     Worry: Not on file     Inability: Not on file    Transportation needs:     Medical: Not on file     Non-medical: Not on file   Tobacco Use    Smoking status: Never Smoker    Smokeless tobacco: Never Used   Substance and Sexual Activity    Alcohol use: No    Drug use: No    Sexual activity: Never   Lifestyle    Physical activity:     Days per week: Not on file     Minutes per session: Not on file    Stress: Not on file   Relationships    Social connections:     Talks on phone: Not on file     Gets together: Not on file     Attends Synagogue service: Not on file     Active member of club or organization: Not on file     Attends meetings of clubs or organizations: Not on file     Relationship status: Not on file   Other Topics Concern    Not on file   Social History Narrative    Not on file         Objective:   Last 24 Hour Vital Signs:  BP  Min: 103/56  Max: 139/64  Temp  Av.8 °F (36.6 °C)  Min: 97.5 °F (36.4 °C)  Max: 98.4 °F (36.9 °C)  Pulse  Av.8  Min: 87  Max: 115  Resp  Av.4  Min: 16  Max: 20  SpO2  Av.3 %  Min: 90 %  Max: 96 %  I/O last 3 completed shifts:  In: 1560 [P.O.:1560]  Out: 2765 [Urine:2765]    Physical Examination:  GEN: AAOx3, NAD, appears mildly short of breath  HEENT: NCAT, MMM, PERRL, EOMI, oropharynx clear +JVD with HJS  CV: RRR, no m/r, no S3/S4, warm, well perfused  RESP: CTAB, no wheezes/crackles, no increased WOB  ABD: soft, NTND, normoactive BS, no organomegaly  EXTR: no c/c, intact distal pulses x 4, BLE edema  NEURO: PERRL, EOMI, moving all four extremities,  intact sensation to light touch, no focal deficits  SKIN: no rashes, lesions, or color changes  PSYCH: normal affect    Laboratory:  I have reviewed all pertinent lab results/findings within the past 24 hours.    Radiology:  I have reviewed all pertinent imaging results/findings within the past 24 hours.    Current Medications:     Infusions:   DOBUTamine 2.5 mcg/kg/min (12/01/19 9480)    furosemide (LASIX) 2 mg/mL infusion (non-titrating) 20 mg/hr (12/02/19 7431)        Scheduled:   aspirin  81 mg Oral Daily    chlorothiazide (DIURIL) IVPB  500 mg Intravenous Q12H    ferrous sulfate  325 mg Oral TID    fluticasone propionate  2 spray Each Nostril Daily    gabapentin  100 mg Oral Q12H    isosorbide-hydrALAZINE 20-37.5 mg  1 tablet Oral TID    mirtazapine  7.5 mg Oral QHS    pantoprozole (PROTONIX) IV  40 mg Intravenous Q12H    senna-docusate 8.6-50 mg  1 tablet Oral BID        PRN:  sodium chloride, sodium chloride, sodium chloride, sodium chloride, calcium carbonate, diphenhydrAMINE, melatonin, methyl salicylate-menthol 15-10%, ondansetron, promethazine (PHENERGAN) IVPB, sodium chloride 0.9%, traMADol    Prior records reviewed.    Assessment/Plan:     Hamlet Terrell is a 54 y.o.male with    Acute on chronic combined systolic and diastolic heart failure  Initial poor response to 80 mg of Lasix and patient given Lasix 120 mg IV and Diuril 500 mg IV on admission per Cardiology.  EF 23%.  Added inotropic support with  2.5 mcg/kg/min  Monitor closely for arrhythmia with repletion K and Mg to 4.0/2.0 respectively  C/w lasix to 20 mg/hr  C/w diuril  Continue Bidil as tolerated  Hold lopressor    Abnormal CT  Abnormal opacity in middle lobe of right lung  Non-smoker  Obtain CT of the chest once more euvolemic (could not lay flat)     JEAN-PIERRE on CKD3  Cr 1.9 > 2.3 > 3.2 > 3.7 > 3.2 >2.6 (baseline 1.6-1.7)  Suspect cardiorenal  Monitor on diuresis  Continue to monitor electrolytes   Nephrology consulted,  appreciate recommendations    Essential hypertension  Adjust antiHTNsives as above     Atrial fibrillation, chronic  Chronic anticoagulation  INR 3.3  Hold coumadin for now with possible GIB  FFP given prior to EGD/cscope  Daily INR's  HR controlled. Hold metoprolol with  on board     Iron deficiency anemia  Patient with LANDON, initially refused blood tranfusions  Patient now agrees to blood transfusion - transfused 3U pRBC  Increase iron dosage  Rectal examination refused by patient only visual examination without obvious hemorrhoid  GI consult given c/f occult bleeding with inadequate response to blood transfusion  Check RUQ sono > no splenomegaly     History of CVA (cerebrovascular accident)  No acute management     Chronic respiratory failure  1.5 L NC at home  Reports malfunctioning O2 tank at home - consult CM     DVT PPx: coumadin (when feasible)  Diet: cardiac 1200 cc (when feasible)  FULL CODE    Markus Howard MD  Hospital Medicine Staff  [facility]

## 2019-12-02 NOTE — ANESTHESIA PREPROCEDURE EVALUATION
12/02/2019  Hamlet Terrell is a 54 y.o., male.    Anesthesia Evaluation         Review of Systems  Anesthesia Hx:  No problems with previous Anesthesia   Social:  Non-Smoker, No Alcohol Use    Hematology/Oncology:        Hematology Comments: Anticoagulant long-term use   Cardiovascular:   Hypertension Past MI CAD  Dysrhythmias atrial fibrillation CHF estimated ejection fraction is 25%   Pulmonary:   Shortness of breath Sleep Apnea PE (pulmonary embolism)   Renal/:   Chronic Renal Disease, CRI    Hepatic/GI:   GERD    Musculoskeletal:  Spine Disorders: lumbar    Neurological:   CVA   Peripheral Neuropathy    Endocrine:   Hyperthyroidism        Physical Exam  General:  Morbid Obesity    Airway/Jaw/Neck:  Airway Findings: Mouth Opening: Normal Tongue: Normal            Mental Status:  Mental Status Findings:  Cooperative, Alert and Oriented         Anesthesia Plan  Type of Anesthesia, risks & benefits discussed:  Anesthesia Type:  general  Patient's Preference:   Intra-op Monitoring Plan: standard ASA monitors  Intra-op Monitoring Plan Comments:   Post Op Pain Control Plan:   Post Op Pain Control Plan Comments:   Induction:   IV  Beta Blocker:  Patient is on a Beta-Blocker and has received one dose within the past 24 hours (No further documentation required).       Informed Consent: Patient understands risks and agrees with Anesthesia plan.  Questions answered. Anesthesia consent signed with patient.  ASA Score: 4     Day of Surgery Review of History & Physical:    H&P update referred to the provider.         Ready For Surgery From Anesthesia Perspective.

## 2019-12-02 NOTE — PROGRESS NOTES
Patient refused am meds until after procedure and refused new IV. Dr. Howard notified and ok with pausing Lasix during FFP administration.    1116: Patient's IV w/FFP leaking and burning. IV removed. Patient refused new IV, team notified. Per blood bank, FFP is good for 4 hours.

## 2019-12-02 NOTE — PROGRESS NOTES
Patient repeatedly educated on drinking golytely for GI test series.  He stated he is drinking the solution. As of 0400 this morning, half of the solution has been drank. Will continue to encourage consumption of solution.

## 2019-12-02 NOTE — PROGRESS NOTES
[facility]  Nephrology  Progress Note    Patient Name: Hamlet Terrell  MRN: [id number]  Admission Date: 11/24/2019  Hospital Length of Stay: 8 days  Attending Provider: Markus Howard MD   Primary Care Physician: Carlin Swift MD  Principal Problem:Acute on chronic combined systolic and diastolic heart failure    Subjective:     HPI: Mr. Terrell is a 55yo man with PMH of HFrEF (last 2D ECHO EF 23%, moderate MR, moderate TR, diastolic dysfunction).  hypertension, chronic atrial fibrillation, a flutter status post ablation, CVA in 2009, coronary artery disease, and obstructive sleep apnea who Nephrology is consulted for JEAN-PIERRE on CKD.    He initially presented with a complaint of worsening edema and shortness of breath for a few days. He states he takes Lasix 80mg PO BID and noted that he is urinating less with his current dose of Lasix.  He is on 1.5L Oxygen via NC at home but noticed worsening cough and SOB at rest and with exertion. He also has severe anemia with a hemoglobin of 6.2 which is consistent with his hemoglobin obtained on last admit on 10/23/2019.  The patient refuses blood products as he believes it may cause AIDs.   BNP was done and was significant for a value of 210.  The patient was initially given a mg of Lasix IV with minimal response and this was followed by 120 mg IV per Cardiology. BP reviewed per chart.. No extremely hypotensive episodes. Denies recent NSAID use.   He notes BLE blistering since July which he attributes as a reaction to lasix.       Interval History:   Maintaining good UOP with net negative of 1.4 L, satting in RA.    Review of patient's allergies indicates:   Allergen Reactions    Acetaminophen      Itching    Oxycodone-acetaminophen      Other reaction(s): Itching    Ace inhibitors Other (See Comments)     cough     Current Facility-Administered Medications   Medication Frequency    0.9%  NaCl infusion (for blood administration) Q24H PRN    0.9%  NaCl  infusion (for blood administration) Q24H PRN    0.9%  NaCl infusion (for blood administration) Q24H PRN    0.9%  NaCl infusion (for blood administration) Q24H PRN    aspirin EC tablet 81 mg Daily    calcium carbonate 200 mg calcium (500 mg) chewable tablet 500 mg TID PRN    chlorothiazide (DIURIL) 500 mg in dextrose 5 % 50 mL IVPB Q12H    diphenhydrAMINE capsule 25 mg Q6H PRN    DOBUTamine 500mg in D5W 250mL infusion (premix) (NON-TITRATING) Continuous    ferrous sulfate EC tablet 325 mg TID    fluticasone propionate 50 mcg/actuation nasal spray 100 mcg Daily    furosemide (LASIX) 2 mg/mL in sodium chloride 0.9% 100 mL infusion (conc: 2 mg/mL) Continuous    gabapentin 250 mg/5 mL (5 mL) solution 100 mg Q12H    isosorbide-hydrALAZINE 20-37.5 mg per tablet 1 tablet TID    melatonin tablet 6 mg Nightly PRN    methyl salicylate-menthol 15-10% cream QID PRN    mirtazapine tablet 7.5 mg QHS    ondansetron injection 4 mg Q6H PRN    pantoprazole injection 40 mg Q12H    promethazine (PHENERGAN) 12.5 mg in dextrose 5 % 50 mL IVPB Q6H PRN    senna-docusate 8.6-50 mg per tablet 1 tablet BID    sodium chloride 0.9% flush 10 mL PRN    traMADol tablet 50 mg Q6H PRN     Facility-Administered Medications Ordered in Other Encounters   Medication Frequency    glycopyrrolate injection PRN       Objective:     Vital Signs (Most Recent):  Temp: 97.9 °F (36.6 °C) (12/02/19 1045)  Pulse: 100 (12/02/19 1323)  Resp: 18 (12/02/19 1323)  BP: (!) 141/77 (12/02/19 1323)  SpO2: (!) 94 % (12/02/19 1323)  O2 Device (Oxygen Therapy): room air (12/02/19 1331) Vital Signs (24h Range):  Temp:  [97.5 °F (36.4 °C)-98.4 °F (36.9 °C)] 97.9 °F (36.6 °C)  Pulse:  [] 100  Resp:  [14-18] 18  SpO2:  [90 %-96 %] 94 %  BP: (112-146)/(56-79) 141/77     Weight: (pt refuse to take his weight this morning ) (12/02/19 0730)  Body mass index is 40.37 kg/m².  Body surface area is 2.46 meters squared.    I/O last 3 completed shifts:  In: 1560  [P.O.:1560]  Out: 2765 [Urine:2765]    Physical Exam   Constitutional: He is oriented to person, place, and time. He appears well-developed and well-nourished.   HENT:   Head: Normocephalic and atraumatic.   Eyes: Pupils are equal, round, and reactive to light. EOM are normal.   Neck: Normal range of motion. JVD present. No thyromegaly present.   Cardiovascular: Normal rate, regular rhythm and normal heart sounds.   No murmur heard.  Pulmonary/Chest: Effort normal and breath sounds normal. He has no wheezes. He has no rales.   Abdominal: Bowel sounds are normal. There is no tenderness.   Musculoskeletal: He exhibits edema.   Neurological: He is alert and oriented to person, place, and time. No cranial nerve deficit.   Psychiatric: He has a normal mood and affect. Judgment normal.       Significant Labs:  All labs within the past 24 hours have been reviewed.         Assessment/Plan:     JEAN-PIERRE (acute kidney injury)  54-year-old male with HFrEF (last 2D ECHO EF 23%, moderate MR, moderate TR, diastolic dysfunction).  hypertension, chronic atrial fibrillation, a flutter status post ablation, CVA in 2009, coronary artery disease, CKD III, and obstructive sleep apnea presents with ADCHF. Nephro consulted for JEAN-PIERRE on CKD.     Was evaluated by Nephrology in 7/2019 for JEAN-PIERRE during ADCHF admission. At the time JEAN-PIERRE was thought to be to be multifactorial from labile BP, nephrotoxic meds, & component of cardiorenal. CXR with oval opacity thought to be loculated fluid at site of right lung fissure. Given IV lasix during hospital admission. UOP declined today. Patient still complaining of SOB. On 1.5L at home. UA unremarkable. Last TTE on 4/2019 with EF 23%, PAP 47 and diastolic dysfunction.       Patient reported apprehension about blood transfusion as he thinks he may contract AIDs. Advised that his Hb 6.2 is suboptimal for cardiac function and blood is rigorously screened. Patient now on dobutamine & lasix gtt for concern for  cardiogenic shock. May need RHC?    Recommendations:   -cont Lasix to 20mg/hr gtt with Diuril IVPB  -oxygen stable on room air   -Obtain CT chest to further evaluate CXR findings   - Strict Is and Os and daily weights, patient reports discrepancy in I/Os- states output is much less  - Avoid nephrotoxic medications        Thank you for your consult. I will follow-up with patient. Please contact us if you have any additional questions.    Sg Mosley MD  Nephrology  [facility]    ATTENDING PHYSICIAN ATTESTATION  I have personally verified the history and examined the patient. I thoroughly reviewed the demographic, clinical, laboratorial and imaging information available in medical records. I agree with the assessment and recommendations provided by the subspecialty resident who was under my supervision.

## 2019-12-02 NOTE — NURSING TRANSFER
Nursing Transfer Note      12/2/2019     Transfer To: [facility] From [facility]    Transfer via stretcher    Transfer with cardiac monitoring    Transported by pct    Medicines sent: infusing    Chart send with patient: Yes    Notified: nurse    Patient reassessed at: 12/2/19 1710 (date, time)    Upon arrival to floor: bed in lowest position

## 2019-12-02 NOTE — DISCHARGE INSTRUCTIONS
Recovery After Procedural Sedation (Adult)  You have been given medicine by vein to make you sleep during your surgery. This may have included both a pain medicine and sleeping medicine. Most of the effects have worn off. But you may still have some drowsiness for the next 6 to 8 hours.  Home care  Follow these guidelines when you get home:  · For the next 8 hours, you should be watched by a responsible adult. This person should make sure your condition is not getting worse.  · Don't drink any alcohol for the next 24 hours.  · Don't drive, operate dangerous machinery, or make important business or personal decisions during the next 24 hours.  Note: Your healthcare provider may tell you not to take any medicine by mouth for pain or sleep in the next 4 hours. These medicines may react with the medicines you were given in the hospital. This could cause a much stronger response than usual.  Follow-up care  Follow up with your healthcare provider if you are not alert and back to your usual level of activity within 12 hours.  When to seek medical advice  Call your healthcare provider right away if any of these occur:  · Drowsiness gets worse  · Weakness or dizziness gets worse  · Repeated vomiting  · You can't be awakened   Date Last Reviewed: 10/18/2016  © 7087-4478 [employer]. [address] [place] [address], [place] [address]. All rights reserved. This information is not intended as a substitute for professional medical care. Always follow your healthcare professional's instructions.    Colonoscopy     A camera attached to a flexible tube with a viewing lens is used to take video pictures.     Colonoscopy is a test to view the inside of your lower digestive tract (colon and rectum). Sometimes it can show the last part of the small intestine (ileum). During the test, small pieces of tissue may be removed for testing. This is called a biopsy. Small growths, such as polyps, may also be removed.   Why is  colonoscopy done?  The test is done to help look for colon cancer. And it can help find the source of abdominal pain, bleeding, and changes in bowel habits. It may be needed once a year, depending on factors such as your:  · Age  · Health history  · Family health history  · Symptoms  · Results from any prior colonoscopy  Risks and possible complications  These include:  · Bleeding               · A puncture or tear in the colon   · Risks of anesthesia  · A cancer lesion not being seen  Getting ready   To prepare for the test:  · Talk with your healthcare provider about the risks of the test (see below). Also ask your healthcare provider about alternatives to the test.  · Tell your healthcare provider about any medicines you take. Also tell him or her about any health conditions you may have.  · Make sure your rectum and colon are empty for the test. Follow the diet and bowel prep instructions exactly. If you dont, the test may need to be rescheduled.  · Plan for a friend or family member to drive you home after the test.     Colonoscopy provides an inside view of the entire colon.     You may discuss the results with your doctor right away or at a future visit.  During the test   The test is usually done in the hospital on an outpatient basis. This means you go home the same day. The procedure takes about 30 minutes. During that time:  · You are given relaxing (sedating) medicine through an IV line. You may be drowsy, or fully asleep.  · The healthcare provider will first give you a physical exam to check for anal and rectal problems.  · Then the anus is lubricated and the scope inserted.  · If you are awake, you may have a feeling similar to needing to have a bowel movement. You may also feel pressure as air is pumped into the colon. Its OK to pass gas during the procedure.  · Biopsy, polyp removal, or other treatments may be done during the test.  After the test   You may have gas right after the test. It can  help to try to pass it to help prevent later bloating. Your healthcare provider may discuss the results with you right away. Or you may need to schedule a follow-up visit to talk about the results. After the test, you can go back to your normal eating and other activities. You may be tired from the sedation and need to rest for a few hours.  Date Last Reviewed: 11/1/2016  © 8830-6908 [employer]. [address] [place] [address], [place] [address]. All rights reserved. This information is not intended as a substitute for professional medical care. Always follow your healthcare professional's instructions.

## 2019-12-02 NOTE — ASSESSMENT & PLAN NOTE
54-year-old male with HFrEF (last 2D ECHO EF 23%, moderate MR, moderate TR, diastolic dysfunction).  hypertension, chronic atrial fibrillation, a flutter status post ablation, CVA in 2009, coronary artery disease, CKD III, and obstructive sleep apnea presents with ADCHF. Nephro consulted for JEAN-PIERRE on CKD.     Was evaluated by Nephrology in 7/2019 for JEAN-PIERRE during ADCHF admission. At the time JEAN-PIERRE was thought to be to be multifactorial from labile BP, nephrotoxic meds, & component of cardiorenal. CXR with oval opacity thought to be loculated fluid at site of right lung fissure. Given IV lasix during hospital admission. UOP declined today. Patient still complaining of SOB. On 1.5L at home. UA unremarkable. Last TTE on 4/2019 with EF 23%, PAP 47 and diastolic dysfunction.       Patient reported apprehension about blood transfusion as he thinks he may contract AIDs. Advised that his Hb 6.2 is suboptimal for cardiac function and blood is rigorously screened. Patient now on dobutamine & lasix gtt for concern for cardiogenic shock. May need RHC?    Recommendations:   -cont Lasix to 20mg/hr gtt with Diuril IVPB  -oxygen stable on room air   -Obtain CT chest to further evaluate CXR findings   - Strict Is and Os and daily weights, patient reports discrepancy in I/Os- states output is much less  - Avoid nephrotoxic medications

## 2019-12-02 NOTE — SIGNIFICANT EVENT
Patient c/o chest pains.  MD notified.  EKG, nitro and trop ordered.  EKG showed no changes from last one. Chest pains relived with nitro x 2. Will continue to monitor.

## 2019-12-03 LAB
ANION GAP SERPL CALC-SCNC: 10 MMOL/L (ref 8–16)
ANION GAP SERPL CALC-SCNC: 13 MMOL/L (ref 8–16)
ANISOCYTOSIS BLD QL SMEAR: SLIGHT
BASO STIPL BLD QL SMEAR: ABNORMAL
BASOPHILS # BLD AUTO: 0.04 K/UL (ref 0–0.2)
BASOPHILS NFR BLD: 0.6 % (ref 0–1.9)
BNP SERPL-MCNC: 260 PG/ML (ref 0–99)
BUN SERPL-MCNC: 77 MG/DL (ref 6–20)
BUN SERPL-MCNC: 78 MG/DL (ref 6–20)
CALCIUM SERPL-MCNC: 10 MG/DL (ref 8.7–10.5)
CALCIUM SERPL-MCNC: 9.8 MG/DL (ref 8.7–10.5)
CHLORIDE SERPL-SCNC: 90 MMOL/L (ref 95–110)
CHLORIDE SERPL-SCNC: 90 MMOL/L (ref 95–110)
CO2 SERPL-SCNC: 34 MMOL/L (ref 23–29)
CO2 SERPL-SCNC: 38 MMOL/L (ref 23–29)
CREAT SERPL-MCNC: 2.5 MG/DL (ref 0.5–1.4)
CREAT SERPL-MCNC: 2.5 MG/DL (ref 0.5–1.4)
DIFFERENTIAL METHOD: ABNORMAL
EOSINOPHIL # BLD AUTO: 0.4 K/UL (ref 0–0.5)
EOSINOPHIL NFR BLD: 5.6 % (ref 0–8)
ERYTHROCYTE [DISTWIDTH] IN BLOOD BY AUTOMATED COUNT: 24.3 % (ref 11.5–14.5)
EST. GFR  (AFRICAN AMERICAN): 32.7 ML/MIN/1.73 M^2
EST. GFR  (AFRICAN AMERICAN): 32.7 ML/MIN/1.73 M^2
EST. GFR  (NON AFRICAN AMERICAN): 28.3 ML/MIN/1.73 M^2
EST. GFR  (NON AFRICAN AMERICAN): 28.3 ML/MIN/1.73 M^2
GLUCOSE SERPL-MCNC: 100 MG/DL (ref 70–110)
GLUCOSE SERPL-MCNC: 108 MG/DL (ref 70–110)
HCT VFR BLD AUTO: 25.5 % (ref 40–54)
HGB BLD-MCNC: 7.3 G/DL (ref 14–18)
HYPOCHROMIA BLD QL SMEAR: ABNORMAL
IMM GRANULOCYTES # BLD AUTO: 0.01 K/UL (ref 0–0.04)
IMM GRANULOCYTES NFR BLD AUTO: 0.1 % (ref 0–0.5)
INR PPP: 2.6 (ref 0.8–1.2)
LYMPHOCYTES # BLD AUTO: 0.7 K/UL (ref 1–4.8)
LYMPHOCYTES NFR BLD: 9.9 % (ref 18–48)
MAGNESIUM SERPL-MCNC: 2.2 MG/DL (ref 1.6–2.6)
MAGNESIUM SERPL-MCNC: 2.2 MG/DL (ref 1.6–2.6)
MCH RBC QN AUTO: 22.8 PG (ref 27–31)
MCHC RBC AUTO-ENTMCNC: 28.6 G/DL (ref 32–36)
MCV RBC AUTO: 80 FL (ref 82–98)
MONOCYTES # BLD AUTO: 1.2 K/UL (ref 0.3–1)
MONOCYTES NFR BLD: 18.1 % (ref 4–15)
NEUTROPHILS # BLD AUTO: 4.4 K/UL (ref 1.8–7.7)
NEUTROPHILS NFR BLD: 65.7 % (ref 38–73)
NRBC BLD-RTO: 0 /100 WBC
OVALOCYTES BLD QL SMEAR: ABNORMAL
PLATELET # BLD AUTO: 254 K/UL (ref 150–350)
PLATELET BLD QL SMEAR: ABNORMAL
PMV BLD AUTO: 9.7 FL (ref 9.2–12.9)
POCT GLUCOSE: 106 MG/DL (ref 70–110)
POIKILOCYTOSIS BLD QL SMEAR: SLIGHT
POLYCHROMASIA BLD QL SMEAR: ABNORMAL
POTASSIUM SERPL-SCNC: 3.5 MMOL/L (ref 3.5–5.1)
POTASSIUM SERPL-SCNC: 3.6 MMOL/L (ref 3.5–5.1)
PROTHROMBIN TIME: 25.5 SEC (ref 9–12.5)
RBC # BLD AUTO: 3.2 M/UL (ref 4.6–6.2)
SODIUM SERPL-SCNC: 137 MMOL/L (ref 136–145)
SODIUM SERPL-SCNC: 138 MMOL/L (ref 136–145)
TARGETS BLD QL SMEAR: ABNORMAL
WBC # BLD AUTO: 6.75 K/UL (ref 3.9–12.7)

## 2019-12-03 PROCEDURE — 36415 COLL VENOUS BLD VENIPUNCTURE: CPT

## 2019-12-03 PROCEDURE — 20600001 HC STEP DOWN PRIVATE ROOM

## 2019-12-03 PROCEDURE — 63600175 PHARM REV CODE 636 W HCPCS: Performed by: STUDENT IN AN ORGANIZED HEALTH CARE EDUCATION/TRAINING PROGRAM

## 2019-12-03 PROCEDURE — 25000003 PHARM REV CODE 250: Performed by: HOSPITALIST

## 2019-12-03 PROCEDURE — 99233 SBSQ HOSP IP/OBS HIGH 50: CPT | Mod: ,,, | Performed by: INTERNAL MEDICINE

## 2019-12-03 PROCEDURE — 85025 COMPLETE CBC W/AUTO DIFF WBC: CPT

## 2019-12-03 PROCEDURE — 83735 ASSAY OF MAGNESIUM: CPT | Mod: 91

## 2019-12-03 PROCEDURE — 80048 BASIC METABOLIC PNL TOTAL CA: CPT

## 2019-12-03 PROCEDURE — 99233 PR SUBSEQUENT HOSPITAL CARE,LEVL III: ICD-10-PCS | Mod: ,,, | Performed by: HOSPITALIST

## 2019-12-03 PROCEDURE — 83735 ASSAY OF MAGNESIUM: CPT

## 2019-12-03 PROCEDURE — 97116 GAIT TRAINING THERAPY: CPT

## 2019-12-03 PROCEDURE — 85610 PROTHROMBIN TIME: CPT

## 2019-12-03 PROCEDURE — 99233 SBSQ HOSP IP/OBS HIGH 50: CPT | Mod: ,,, | Performed by: HOSPITALIST

## 2019-12-03 PROCEDURE — 80048 BASIC METABOLIC PNL TOTAL CA: CPT | Mod: 91

## 2019-12-03 PROCEDURE — C9113 INJ PANTOPRAZOLE SODIUM, VIA: HCPCS | Performed by: HOSPITALIST

## 2019-12-03 PROCEDURE — 63600175 PHARM REV CODE 636 W HCPCS: Performed by: HOSPITALIST

## 2019-12-03 PROCEDURE — 97530 THERAPEUTIC ACTIVITIES: CPT

## 2019-12-03 PROCEDURE — 99233 PR SUBSEQUENT HOSPITAL CARE,LEVL III: ICD-10-PCS | Mod: ,,, | Performed by: INTERNAL MEDICINE

## 2019-12-03 PROCEDURE — 83880 ASSAY OF NATRIURETIC PEPTIDE: CPT

## 2019-12-03 RX ORDER — FERROUS SULFATE 325(65) MG
325 TABLET, DELAYED RELEASE (ENTERIC COATED) ORAL DAILY
Status: DISCONTINUED | OUTPATIENT
Start: 2019-12-04 | End: 2019-12-08 | Stop reason: HOSPADM

## 2019-12-03 RX ORDER — POTASSIUM CHLORIDE 7.45 MG/ML
10 INJECTION INTRAVENOUS
Status: DISCONTINUED | OUTPATIENT
Start: 2019-12-03 | End: 2019-12-03

## 2019-12-03 RX ORDER — POTASSIUM CHLORIDE 20 MEQ/15ML
40 SOLUTION ORAL ONCE
Status: COMPLETED | OUTPATIENT
Start: 2019-12-03 | End: 2019-12-03

## 2019-12-03 RX ORDER — PANTOPRAZOLE SODIUM 40 MG/1
40 TABLET, DELAYED RELEASE ORAL
Status: DISCONTINUED | OUTPATIENT
Start: 2019-12-04 | End: 2019-12-08 | Stop reason: HOSPADM

## 2019-12-03 RX ORDER — FUROSEMIDE 10 MG/ML
120 INJECTION INTRAMUSCULAR; INTRAVENOUS 2 TIMES DAILY
Status: DISCONTINUED | OUTPATIENT
Start: 2019-12-04 | End: 2019-12-06

## 2019-12-03 RX ORDER — WARFARIN SODIUM 5 MG/1
5 TABLET ORAL DAILY
Status: DISCONTINUED | OUTPATIENT
Start: 2019-12-03 | End: 2019-12-07

## 2019-12-03 RX ADMIN — MIRTAZAPINE 7.5 MG: 7.5 TABLET ORAL at 08:12

## 2019-12-03 RX ADMIN — FUROSEMIDE 20 MG/HR: 10 INJECTION, SOLUTION INTRAMUSCULAR; INTRAVENOUS at 08:12

## 2019-12-03 RX ADMIN — FERROUS SULFATE TAB EC 325 MG (65 MG FE EQUIVALENT) 325 MG: 325 (65 FE) TABLET DELAYED RESPONSE at 08:12

## 2019-12-03 RX ADMIN — GABAPENTIN 100 MG: 250 SOLUTION ORAL at 08:12

## 2019-12-03 RX ADMIN — TRAMADOL HYDROCHLORIDE 50 MG: 50 TABLET, FILM COATED ORAL at 08:12

## 2019-12-03 RX ADMIN — ASPIRIN 81 MG: 81 TABLET, COATED ORAL at 08:12

## 2019-12-03 RX ADMIN — DOBUTAMINE HYDROCHLORIDE 2.5 MCG/KG/MIN: 200 INJECTION INTRAVENOUS at 07:12

## 2019-12-03 RX ADMIN — POTASSIUM CHLORIDE 10 MEQ: 7.46 INJECTION, SOLUTION INTRAVENOUS at 10:12

## 2019-12-03 RX ADMIN — POTASSIUM CHLORIDE 40 MEQ: 20 SOLUTION ORAL at 11:12

## 2019-12-03 RX ADMIN — HYDRALAZINE HYDROCHLORIDE AND ISOSORBIDE DINITRATE 1 TABLET: 37.5; 2 TABLET, FILM COATED ORAL at 03:12

## 2019-12-03 RX ADMIN — POTASSIUM CHLORIDE 40 MEQ: 20 SOLUTION ORAL at 08:12

## 2019-12-03 RX ADMIN — HYDRALAZINE HYDROCHLORIDE AND ISOSORBIDE DINITRATE 1 TABLET: 37.5; 2 TABLET, FILM COATED ORAL at 08:12

## 2019-12-03 RX ADMIN — PANTOPRAZOLE SODIUM 40 MG: 40 INJECTION, POWDER, FOR SOLUTION INTRAVENOUS at 08:12

## 2019-12-03 RX ADMIN — WARFARIN SODIUM 5 MG: 5 TABLET ORAL at 05:12

## 2019-12-03 NOTE — PT/OT/SLP PROGRESS
Physical Therapy Treatment    Patient Name:  Hamlet Terrell   MRN:  [id number]  Admitting Diagnosis:  Acute on chronic combined systolic and diastolic heart failure   Recent Surgery: Procedure(s) (LRB):  EGD (ESOPHAGOGASTRODUODENOSCOPY) (N/A)  COLONOSCOPY (N/A) 1 Day Post-Op  Admit Date: 11/24/2019  Length of Stay: 9 days    Recommendations:     Discharge Recommendations:  nursing facility, skilled   Discharge Equipment Recommendations: other (see comments)(pt expressed interest in rollator; may be more appropriate for another AD)   Barriers to discharge: None    Assessment:     Hamlet Terrell is a 54 y.o. male admitted with a medical diagnosis of Acute on chronic combined systolic and diastolic heart failure.  He presents with the following impairments/functional limitations:  weakness, impaired endurance, impaired self care skills, impaired functional mobilty, impaired cardiopulmonary response to activity. Pt tolerated session well today. Pt with improvements in functional mobility and ambulation distance from initial evaluation on this date. Pt is fearful of MUÑOZ so self limits ambulation distance despite max encouragement from PT. Pt otherwise safe with ambulation with VSS throughout. May benefit from trial of SPC for increased RUBÉN for improved balance, as pt likes to use IV pole during ambulation. Pt will benefit from SNF after discharge from acute services in order to continue to progress pt's strength, endurance, and balance to help pt maximize independence at or near PLOF.     Rehab Prognosis: Good; patient would benefit from acute skilled PT services to address these deficits and reach maximum level of function.    Recent Surgery: Procedure(s) (LRB):  EGD (ESOPHAGOGASTRODUODENOSCOPY) (N/A)  COLONOSCOPY (N/A) 1 Day Post-Op    Plan:     During this hospitalization, patient to be seen 3 x/week to address the identified rehab impairments via gait training, therapeutic activities, therapeutic exercises,  "neuromuscular re-education and progress toward the following goals:    · Plan of Care Expires:  12/27/19    Subjective     RN notified prior to session. No family/friends present upon PT entrance into room.    Chief Complaint: "I have to rest up to walk that's why you have to come in the afternoon"  Patient/Family Comments/goals: get stronger  Pain/Comfort:  Pain Rating 1: 0/10  Pain Rating Post-Intervention 1: 0/10      Objective:     Additional staff present: none    Patient found up in chair with: telemetry, peripheral IV   Mental Status: Patient is oriented to AxOx4 and follows multistep commands. Patient is Alert and Cooperative during session.    General Precautions: Standard, Cardiac fall   Orthopedic Precautions:N/A   Braces: N/A   Body mass index is 40.37 kg/m².  Oxygen Device: Room Air    Outcome Measures:  AM-PAC 6 CLICK MOBILITY  Turning over in bed (including adjusting bedclothes, sheets and blankets)?: 3  Sitting down on and standing up from a chair with arms (e.g., wheelchair, bedside commode, etc.): 3  Moving from lying on back to sitting on the side of the bed?: 3  Moving to and from a bed to a chair (including a wheelchair)?: 3  Need to walk in hospital room?: 3  Climbing 3-5 steps with a railing?: 2  Basic Mobility Total Score: 17     Functional Mobility:    Bed Mobility:   · Pt found/returned to bedside chair    Transfers:   · Sit <> Stand Transfer: stand by assistance with no assistive device   · Stand <> Sit Transfer: stand by assistance with no assistive device   · [id number] from bedside chair    Standing Balance:   Assistance Level Required: Contact Guard Assistance   Patient used: no assistive device     Gait:  · Patient ambulated: 60 ft   · Patient required: contact guard  · Patient used:  rolling walker   · Gait Pattern observed: reciprocal gait  · Gait Deviation(s): decreased step length, decreased weight shift, shuffle gait and decreased jordan  · Impairments due to: decreased " endurance  · all lines remained intact throughout ambulation trial  · Comments: Pt was able to perform vertical/horizontal head turns, 180 degree turns while ambulating, and avoid hallway obstacles without LOB.\    Education:   Time provided for education, counseling and discussion of health disposition in regards to patient's current status   All questions answered within PT scope of practice and to patient's satisfaction   PT role in POC to address current functional deficits   Pt educated on proper body mechanics, safety techniques, and energy conservation with PT facilitation and cueing throughout session   Whiteboard updated with pt's current mobility status documented above   Safe to perform step transfer to/from chair/bedside commode CGA and no AD w/ nursing/PCT present   Pt to ambulate 2x/day CGA and no AD with nsg/pct in order to maintain functional mobility    Pt educated extensively on various AD for energy conservation. Pt expressed interest in a rollator    Patient left up in chair with all lines intact, call button in reach and RN notified.    GOALS:   Multidisciplinary Problems     Physical Therapy Goals        Problem: Physical Therapy Goal    Goal Priority Disciplines Outcome Goal Variances Interventions   Physical Therapy Goal     PT, PT/OT Ongoing, Progressing     Description:  Goals to be met by: 2019    Patient will increase functional independence with mobility by performin. Supine to sit with Contact Guard Assistance - not met  2. Sit to stand transfer with Contact Guard Assistance using LRAD - not met  3. Gait  x 50 feet with Contact Guard Assistance using LRAD - met 12/3/19  3a. Gait x 50 feet with Supervision using LRAD  4. Ascend/descend 3 stair with Minimal Assistance - not met  5. Stand for 3 minutes with Contact Guard Assistance using LRAD - not met  6. Lower extremity exercise program x15 reps per handout, with assistance as needed - not met                      Time Tracking:     PT Received On: 12/03/19  PT Start Time: 1305     PT Stop Time: 1325  PT Total Time (min): 20 min       Billable Minutes:   · Therapeutic Activity 12    Treatment Type: Treatment  PT/PTA: PT       Sabi Jones PT, DPT  12/3/2019  Pager: [phone]

## 2019-12-03 NOTE — TREATMENT PLAN
[facility]  Gastroenterology Treatment Plan        Objective:     Vitals:    10/11/19 0622   BP: 110/60   Pulse:    Resp:    Temp:          Significant Labs:  Recent Labs   Lab 11/30/19  1902 12/01/19  0435 12/02/19  0334   HGB 7.3* 7.3* 7.1*       Lab Results   Component Value Date    WBC 6.39 12/02/2019    HGB 7.1 (L) 12/02/2019    HCT 24.9 (L) 12/02/2019    MCV 80 (L) 12/02/2019     12/02/2019       Lab Results   Component Value Date     12/02/2019    K 3.8 12/02/2019    CL 90 (L) 12/02/2019    CO2 30 (H) 12/02/2019    BUN 75 (H) 12/02/2019    CREATININE 2.6 (H) 12/02/2019    CALCIUM 9.7 12/02/2019    ANIONGAP 17 (H) 12/02/2019    ESTGFRAFRICA 31.2 (A) 12/02/2019    EGFRNONAA 26.9 (A) 12/02/2019       Lab Results   Component Value Date    ALT 9 (L) 11/28/2019    AST 13 11/28/2019     (H) 04/21/2019    ALKPHOS 207 (H) 11/28/2019    BILITOT 2.5 (H) 11/29/2019       Lab Results   Component Value Date    INR 3.3 (H) 12/02/2019    INR 3.0 (H) 12/01/2019    INR 2.6 (H) 11/30/2019       Significant Imaging:  Reviewed pertinent radiology findings.             Assessment/Plan:       EGD/colon done:  Impression:           - Normal Study.                        - Blood in the oropharynx.  Recommendation:       - Return patient to [facility] for [facility] care.                        - Resume previous diet.                        - Continue present medications.    Impression:           - Preparation of the colon was poor.                        - One small polyp in the cecum.  Recommendation:       - Patient has a contact number available for                         emergencies. The signs and symptoms of potential                         delayed complications were discussed with the                         patient. Return to normal activities tomorrow.                         Written discharge instructions were provided to the                         patient.                        -  Discharge patient to home.                        - Resume previous diet.                        - Continue present medications.    Thank you for involving us in the care of Hamlet Terrell. Please call with any additional questions, concerns or changes in the patient's clinical status.    Jose Antonio Cuba MD  Gastroenterology Fellow PGY IV   [facility]

## 2019-12-03 NOTE — PLAN OF CARE
Discharge Recommendation: SNF.    1 goal met and 1 goal added today. PT goals appropriate.    Sabi Jones PT, DPT  12/3/2019  Pager: [phone]        Problem: Physical Therapy Goal  Goal: Physical Therapy Goal  Description  Goals to be met by: 2019    Patient will increase functional independence with mobility by performin. Supine to sit with Contact Guard Assistance - not met  2. Sit to stand transfer with Contact Guard Assistance using LRAD - not met  3. Gait  x 50 feet with Contact Guard Assistance using LRAD - met 12/3/19  3a. Gait x 50 feet with Supervision using LRAD  4. Ascend/descend 3 stair with Minimal Assistance - not met  5. Stand for 3 minutes with Contact Guard Assistance using LRAD - not met  6. Lower extremity exercise program x15 reps per handout, with assistance as needed - not met      Outcome: Ongoing, Progressing

## 2019-12-03 NOTE — PLAN OF CARE
Plan of care discussed with patient. Patient is free of fall/trauma/injury. Denies CP, SOB. Patient states that he has pain in his legs and thigh. Lasix gtts discontinued, IV push lasix 120mg BID to start tonight. DBT gtts running at prescribed rate. Patient refuses to let RN replace  IV stated that if  it is taken out, a new one will not be put back in.  All questions addressed. Will continue to monitor

## 2019-12-03 NOTE — ANESTHESIA POSTPROCEDURE EVALUATION
Anesthesia Post Evaluation    Patient: Hamlet Terrell    Procedure(s) Performed: Procedure(s) (LRB):  EGD (ESOPHAGOGASTRODUODENOSCOPY) (N/A)  COLONOSCOPY (N/A)    Final Anesthesia Type: general    Patient location during evaluation: PACU  Patient participation: Yes- Able to Participate  Level of consciousness: awake and alert  Post-procedure vital signs: reviewed and stable  Pain management: adequate  Airway patency: patent    PONV status at discharge: No PONV  Anesthetic complications: no      Cardiovascular status: blood pressure returned to baseline  Respiratory status: unassisted  Hydration status: euvolemic  Follow-up not needed.          Vitals Value Taken Time   /66 12/3/2019  8:02 AM   Temp 36.7 °C (98.1 °F) 12/3/2019  8:02 AM   Pulse 118 12/3/2019  8:14 AM   Resp 18 12/3/2019  8:02 AM   SpO2 91 % 12/3/2019  8:02 AM         No case tracking events are documented in the log.      Pain/Amy Score: Pain Rating Prior to Med Admin: 10 (12/2/2019 10:19 PM)  Pain Rating Post Med Admin: 3 (12/2/2019 11:19 PM)  Amy Score: 9 (12/2/2019  4:30 PM)

## 2019-12-03 NOTE — PROGRESS NOTES
Shortly after starting IV potassium, patient stated that it was burning. RN stated that the potassium can do that, to which the patient stated that the RN could either stop the potassium or he would pull the IV out and the RN would not be allowed to start a new IV. Notified MD Howard. Orders given to change from IV to oral liquid potassium 40meq one time dose. Will continue to monitor.

## 2019-12-03 NOTE — PLAN OF CARE
Pt remained free of injuries, falls, and trauma. VSS. PRN pain medication administered. Continuous  and Lasix infusing at prescribed rate. Fluid restriction maintained. Strict intake and output being monitored. Pt's H/H 7.1/24.9. EGD/Colonoscopy was completed today. Pt refused most of tonight medications. Glucose monitored, no insulin coverage needed. Plan of care reviewed with pt. Pt verbalized understanding. All questions and concerns addressed. No complaints mentioned at this time. Will continue to monitor.

## 2019-12-03 NOTE — PLAN OF CARE
12/03/19 0646   Discharge Reassessment   Assessment Type Discharge Planning Reassessment   Do you have any problems affording any of your prescribed medications? TBD   Discharge Plan A Home;Home Health   DME Needed Upon Discharge  none

## 2019-12-03 NOTE — PROGRESS NOTES
[facility] Hospital Medicine  Progress Note    Primary Team: [facility]  Admit Date: 11/24/2019    Subjective:      HPI:   Mr. Terrell is a 54M HFrEF (EF 23%, moderate MR, moderate TR, diastolic dysfunction; multiple admissions in 2019, no ICD), with pulm HTN (PASP 47 mm Hg), Permanent AF/AFL s/p CTI with CVA in 2009 on Coumadin, CAD and obstructive sleep apnea who presented 11/24/19 with 1 week history of worsening SOB / MUÑOZ and 30 lb weight gain. Patient states compliance with fluid, salt and medication, However since last week having worsening LE edema with fluid weeping of his legs. Associated with MUÑOZ, Fatigue. He does have chronic severe anemia (refusing transfusion) with Hbg 6, additionally CXR demonstrating worsening RLL consolidation vs. Effusion. Case discussed with cardiology in ED, given Lasix IVP with diuril with minimal UOP, today patient having AMS / lethargy, worsening renal function with elevated BNP, Tbili trending up, with soft BP SBP 90s, requiring supplemental oxygen. Cardiology consulted for Inotropic support, on telemetry patient having NSVT with permanent Afib.He also has severe anemia with a hemoglobin of 6.2 which is consistent with his hemoglobin obtained on last admit on 10/23/2019.  The patient initially refuses blood products due to risk of transmission of infection.  Chest x-ray was done which showed increase lobulated oval opacity in the right mid lung field which is a new finding compared to chest x-ray done on 10/23/2019.  BNP was done and was significant for a value of 210.  The patient was initially given a mg of Lasix IV with minimal response and this was followed by 120 mg IV per Cardiology.    ECHO 11/17/2019  · Severely decreased left ventricular systolic function. The estimated ejection fraction is 25%  · Concentric left ventricular hypertrophy.  · Global hypokinetic wall motion.  · Moderate right ventricular enlargement.  · Moderately reduced right  ventricular systolic function.  · Severe biatrial enlargement.  · Moderate mitral regurgitation.  · Moderate tricuspid regurgitation.  · The estimated PA systolic pressure is 40 mm Hg  · Elevated central venous pressure (15 mm Hg).  · Atrial fibrillation observed.    Hospital course:  Admitted  for ADHF on IV Lasix. Weight increasing with UOP minimal and Cr worsening. Patient seen by nephrology and cardiology for assistance. Persistent symptoms as above, with additional lethargy without change in mental status. Patient was started on IV  on 11/26 as well as lasix gtt. Diuril augmentation added. Patient with persistent anemia despite blood transfusions. GI was consulted with concern for occult bleeding. Patient denies active bleeding and refuses rectal examination.  The patient's counts remain above 7 after 3U pRBC. Patient planned for endoscopic evaluation, with EGD and colonoscopy without evidence of active bleeding (the latter limited by suboptimal prep). The patient has had sustained runs of VT so cardiology was consulted but feel this is actually more c/w AF with RVR and thus  is continued.     Interval history:   Patient with stagnant UOP. Weight is not documented. Lasix gtt continues to 20 mg/hr with diuril augmentation. Nephrology and cardiology following. Renal function improves. May need to get HTS evaluation given stagnation; will speak with co-management.    ROS:  As per HPI  General: no fever, no chills, no weight loss, no fatigue  Cardiovascular: no chest pain  Respiratory: no cough, no wheezes  All other systems reviewed & are negative.     Past Medical History:   Diagnosis Date    Anticoagulant long-term use     Cardiomegaly     Chronic combined systolic and diastolic congestive heart failure     Coronary artery disease     Heart attack 2006    Hypertension     Hyperthyroidism, subclinical 1/2/2013    MI (myocardial infarction) 9/22/2013    MI in 2009      Paroxysmal atrial fibrillation      PE (pulmonary embolism) 2013    IN 2010     S/P ablation of atrial flutter 2008    Stroke 2009    no residual weaknesses     Past Surgical History:   Procedure Laterality Date    RADIOFREQUENCY ABLATION  2008    for atrial flutter     Social History     Socioeconomic History    Marital status: Single     Spouse name: Not on file    Number of children: Not on file    Years of education: Not on file    Highest education level: Not on file   Occupational History    Not on file   Social Needs    Financial resource strain: Not on file    Food insecurity:     Worry: Not on file     Inability: Not on file    Transportation needs:     Medical: Not on file     Non-medical: Not on file   Tobacco Use    Smoking status: Never Smoker    Smokeless tobacco: Never Used   Substance and Sexual Activity    Alcohol use: No    Drug use: No    Sexual activity: Never   Lifestyle    Physical activity:     Days per week: Not on file     Minutes per session: Not on file    Stress: Not on file   Relationships    Social connections:     Talks on phone: Not on file     Gets together: Not on file     Attends [employer] service: Not on file     Active member of club or organization: Not on file     Attends meetings of clubs or organizations: Not on file     Relationship status: Not on file   Other Topics Concern    Not on file   Social History Narrative    Not on file         Objective:   Last 24 Hour Vital Signs:  BP  Min: 118/58  Max: 157/84  Temp  Av.8 °F (36.6 °C)  Min: 97.2 °F (36.2 °C)  Max: 98.4 °F (36.9 °C)  Pulse  Av.8  Min: 93  Max: 126  Resp  Av.6  Min: 14  Max: 20  SpO2  Av.9 %  Min: 90 %  Max: 100 %  I/O last 3 completed shifts:  In: 1180 [P.O.:1080; I.V.:100]  Out:  [Urine:]    Physical Examination:  GEN: AAOx3, NAD, appears mildly short of breath  HEENT: NCAT, MMM, PERRL, EOMI, oropharynx clear +JVD with HJS  CV: RRR, no m/r, no S3/S4, warm, well perfused  RESP: CTAB,  no wheezes/crackles, no increased WOB  ABD: soft, NTND, normoactive BS, no organomegaly  EXTR: no c/c, intact distal pulses x 4, BLE edema  NEURO: PERRL, EOMI, moving all four extremities, intact sensation to light touch, no focal deficits  SKIN: no rashes, lesions, or color changes  PSYCH: normal affect    Laboratory:  I have reviewed all pertinent lab results/findings within the past 24 hours.    Radiology:  I have reviewed all pertinent imaging results/findings within the past 24 hours.    Current Medications:     Infusions:   DOBUTamine 2.5 mcg/kg/min (12/02/19 1326)    furosemide (LASIX) 2 mg/mL infusion (non-titrating) 20 mg/hr (12/03/19 0848)        Scheduled:   aspirin  81 mg Oral Daily    chlorothiazide (DIURIL) IVPB  500 mg Intravenous Q12H    ferrous sulfate  325 mg Oral TID    fluticasone propionate  2 spray Each Nostril Daily    gabapentin  100 mg Oral Q12H    isosorbide-hydrALAZINE 20-37.5 mg  1 tablet Oral TID    mirtazapine  7.5 mg Oral QHS    pantoprozole (PROTONIX) IV  40 mg Intravenous Q12H    potassium chloride in water  10 mEq Intravenous Q1H    senna-docusate 8.6-50 mg  1 tablet Oral BID        PRN:  sodium chloride, sodium chloride, sodium chloride, sodium chloride, calcium carbonate, diphenhydrAMINE, melatonin, methyl salicylate-menthol 15-10%, ondansetron, promethazine (PHENERGAN) IVPB, sodium chloride 0.9%, traMADol    Prior records reviewed.    Assessment/Plan:     Hamlet Terrell is a 54 y.o.male with    Acute on chronic combined systolic and diastolic heart failure  Initial poor response to 80 mg of Lasix and patient given Lasix 120 mg IV and Diuril 500 mg IV on admission per Cardiology.  EF 23%.  Added inotropic support with  2.5 mcg/kg/min  Monitor closely for arrhythmia with repletion K and Mg to 4.0/2.0 respectively  C/w lasix to 20 mg/hr  C/w diuril  Continue Bidil as tolerated  Hold lopressor    Abnormal CT  Abnormal opacity in middle lobe of right  lung  Non-smoker  Obtain CT of the chest once more euvolemic (could not lay flat)     JEAN-PIERRE on CKD3  Cr 1.9 > 2.3 > 3.2 > 3.7 > 3.2 >2.6>2.5 (baseline 1.6-1.7)  Suspect cardiorenal  Monitor on diuresis  Continue to monitor electrolytes   Nephrology consulted, appreciate recommendations    Essential hypertension  Adjust antiHTNsives as above     Atrial fibrillation, chronic  Chronic anticoagulation  INR 2.6  Hold coumadin for now with possible GIB  FFP given prior to EGD/cscope  Daily INR's  HR controlled. Hold metoprolol with  on board     Iron deficiency anemia  Patient with LANDON, initially refused blood tranfusions  Patient now agrees to blood transfusion - transfused 3U pRBC  C/w daily iron  GI consult appreciated; endoscopic evaluation - no active bleeding noted     History of CVA (cerebrovascular accident)  No acute management     Chronic respiratory failure  1.5 L NC at home  Reports malfunctioning O2 tank at home - consult CM     DVT PPx: coumadin (when feasible)  Diet: cardiac 1200 cc (when feasible)  FULL CODE    Markus Howard MD  Hospital Medicine Staff  [facility]

## 2019-12-04 LAB
ABO + RH BLD: NORMAL
ANION GAP SERPL CALC-SCNC: 13 MMOL/L (ref 8–16)
ANION GAP SERPL CALC-SCNC: 14 MMOL/L (ref 8–16)
BASOPHILS # BLD AUTO: 0.02 K/UL (ref 0–0.2)
BASOPHILS NFR BLD: 0.3 % (ref 0–1.9)
BLD GP AB SCN CELLS X3 SERPL QL: NORMAL
BLD PROD TYP BPU: NORMAL
BLOOD UNIT EXPIRATION DATE: NORMAL
BLOOD UNIT TYPE CODE: 2800
BLOOD UNIT TYPE CODE: 8400
BLOOD UNIT TYPE CODE: 8400
BLOOD UNIT TYPE: NORMAL
BUN SERPL-MCNC: 76 MG/DL (ref 6–20)
BUN SERPL-MCNC: 78 MG/DL (ref 6–20)
CALCIUM SERPL-MCNC: 9.5 MG/DL (ref 8.7–10.5)
CALCIUM SERPL-MCNC: 9.6 MG/DL (ref 8.7–10.5)
CHLORIDE SERPL-SCNC: 94 MMOL/L (ref 95–110)
CHLORIDE SERPL-SCNC: 94 MMOL/L (ref 95–110)
CO2 SERPL-SCNC: 34 MMOL/L (ref 23–29)
CO2 SERPL-SCNC: 35 MMOL/L (ref 23–29)
CODING SYSTEM: NORMAL
CREAT SERPL-MCNC: 2.3 MG/DL (ref 0.5–1.4)
CREAT SERPL-MCNC: 2.3 MG/DL (ref 0.5–1.4)
DIFFERENTIAL METHOD: ABNORMAL
DISPENSE STATUS: NORMAL
EOSINOPHIL # BLD AUTO: 0.4 K/UL (ref 0–0.5)
EOSINOPHIL NFR BLD: 6.4 % (ref 0–8)
ERYTHROCYTE [DISTWIDTH] IN BLOOD BY AUTOMATED COUNT: 24.6 % (ref 11.5–14.5)
EST. GFR  (AFRICAN AMERICAN): 36.1 ML/MIN/1.73 M^2
EST. GFR  (AFRICAN AMERICAN): 36.1 ML/MIN/1.73 M^2
EST. GFR  (NON AFRICAN AMERICAN): 31.3 ML/MIN/1.73 M^2
EST. GFR  (NON AFRICAN AMERICAN): 31.3 ML/MIN/1.73 M^2
GLUCOSE SERPL-MCNC: 111 MG/DL (ref 70–110)
GLUCOSE SERPL-MCNC: 115 MG/DL (ref 70–110)
HCT VFR BLD AUTO: 23.7 % (ref 40–54)
HGB BLD-MCNC: 6.7 G/DL (ref 14–18)
IMM GRANULOCYTES # BLD AUTO: 0.03 K/UL (ref 0–0.04)
IMM GRANULOCYTES NFR BLD AUTO: 0.5 % (ref 0–0.5)
INR PPP: 2.2 (ref 0.8–1.2)
LYMPHOCYTES # BLD AUTO: 0.8 K/UL (ref 1–4.8)
LYMPHOCYTES NFR BLD: 11.6 % (ref 18–48)
MAGNESIUM SERPL-MCNC: 2.2 MG/DL (ref 1.6–2.6)
MAGNESIUM SERPL-MCNC: 2.2 MG/DL (ref 1.6–2.6)
MCH RBC QN AUTO: 22.9 PG (ref 27–31)
MCHC RBC AUTO-ENTMCNC: 28.3 G/DL (ref 32–36)
MCV RBC AUTO: 81 FL (ref 82–98)
MONOCYTES # BLD AUTO: 1.4 K/UL (ref 0.3–1)
MONOCYTES NFR BLD: 20.9 % (ref 4–15)
NEUTROPHILS # BLD AUTO: 3.9 K/UL (ref 1.8–7.7)
NEUTROPHILS NFR BLD: 60.3 % (ref 38–73)
NRBC BLD-RTO: 0 /100 WBC
NUM UNITS TRANS FFP: NORMAL
NUM UNITS TRANS FFP: NORMAL
PLATELET # BLD AUTO: 228 K/UL (ref 150–350)
PMV BLD AUTO: 9.2 FL (ref 9.2–12.9)
POCT GLUCOSE: 139 MG/DL (ref 70–110)
POTASSIUM SERPL-SCNC: 3.8 MMOL/L (ref 3.5–5.1)
POTASSIUM SERPL-SCNC: 3.9 MMOL/L (ref 3.5–5.1)
PROTHROMBIN TIME: 21.1 SEC (ref 9–12.5)
RBC # BLD AUTO: 2.93 M/UL (ref 4.6–6.2)
SODIUM SERPL-SCNC: 142 MMOL/L (ref 136–145)
SODIUM SERPL-SCNC: 142 MMOL/L (ref 136–145)
TRANS ERYTHROCYTES VOL PATIENT: NORMAL ML
WBC # BLD AUTO: 6.54 K/UL (ref 3.9–12.7)

## 2019-12-04 PROCEDURE — 97803 MED NUTRITION INDIV SUBSEQ: CPT

## 2019-12-04 PROCEDURE — 83735 ASSAY OF MAGNESIUM: CPT

## 2019-12-04 PROCEDURE — 97116 GAIT TRAINING THERAPY: CPT

## 2019-12-04 PROCEDURE — 85025 COMPLETE CBC W/AUTO DIFF WBC: CPT

## 2019-12-04 PROCEDURE — 36430 TRANSFUSION BLD/BLD COMPNT: CPT

## 2019-12-04 PROCEDURE — 85610 PROTHROMBIN TIME: CPT

## 2019-12-04 PROCEDURE — 80048 BASIC METABOLIC PNL TOTAL CA: CPT | Mod: 91

## 2019-12-04 PROCEDURE — 63600175 PHARM REV CODE 636 W HCPCS: Performed by: STUDENT IN AN ORGANIZED HEALTH CARE EDUCATION/TRAINING PROGRAM

## 2019-12-04 PROCEDURE — 99233 PR SUBSEQUENT HOSPITAL CARE,LEVL III: ICD-10-PCS | Mod: ,,, | Performed by: HOSPITALIST

## 2019-12-04 PROCEDURE — 25000003 PHARM REV CODE 250: Performed by: HOSPITALIST

## 2019-12-04 PROCEDURE — 99233 SBSQ HOSP IP/OBS HIGH 50: CPT | Mod: ,,, | Performed by: HOSPITALIST

## 2019-12-04 PROCEDURE — 63600175 PHARM REV CODE 636 W HCPCS: Performed by: HOSPITALIST

## 2019-12-04 PROCEDURE — 25000003 PHARM REV CODE 250: Performed by: STUDENT IN AN ORGANIZED HEALTH CARE EDUCATION/TRAINING PROGRAM

## 2019-12-04 PROCEDURE — 97530 THERAPEUTIC ACTIVITIES: CPT

## 2019-12-04 PROCEDURE — 86920 COMPATIBILITY TEST SPIN: CPT

## 2019-12-04 PROCEDURE — 86850 RBC ANTIBODY SCREEN: CPT

## 2019-12-04 PROCEDURE — 97535 SELF CARE MNGMENT TRAINING: CPT

## 2019-12-04 PROCEDURE — 80048 BASIC METABOLIC PNL TOTAL CA: CPT

## 2019-12-04 PROCEDURE — 36415 COLL VENOUS BLD VENIPUNCTURE: CPT

## 2019-12-04 PROCEDURE — 83735 ASSAY OF MAGNESIUM: CPT | Mod: 91

## 2019-12-04 PROCEDURE — P9021 RED BLOOD CELLS UNIT: HCPCS

## 2019-12-04 PROCEDURE — 20600001 HC STEP DOWN PRIVATE ROOM

## 2019-12-04 RX ORDER — MAGNESIUM SULFATE HEPTAHYDRATE 40 MG/ML
2 INJECTION, SOLUTION INTRAVENOUS ONCE
Status: DISCONTINUED | OUTPATIENT
Start: 2019-12-04 | End: 2019-12-08 | Stop reason: HOSPADM

## 2019-12-04 RX ORDER — HYDROCODONE BITARTRATE AND ACETAMINOPHEN 500; 5 MG/1; MG/1
TABLET ORAL
Status: DISCONTINUED | OUTPATIENT
Start: 2019-12-04 | End: 2019-12-08 | Stop reason: HOSPADM

## 2019-12-04 RX ORDER — POTASSIUM CHLORIDE 20 MEQ/15ML
40 SOLUTION ORAL ONCE
Status: COMPLETED | OUTPATIENT
Start: 2019-12-04 | End: 2019-12-04

## 2019-12-04 RX ADMIN — HYDRALAZINE HYDROCHLORIDE AND ISOSORBIDE DINITRATE 1 TABLET: 37.5; 2 TABLET, FILM COATED ORAL at 09:12

## 2019-12-04 RX ADMIN — GABAPENTIN 100 MG: 250 SOLUTION ORAL at 09:12

## 2019-12-04 RX ADMIN — CHLOROTHIAZIDE SODIUM 1000 MG: 500 INJECTION, POWDER, LYOPHILIZED, FOR SOLUTION INTRAVENOUS at 09:12

## 2019-12-04 RX ADMIN — POTASSIUM CHLORIDE 40 MEQ: 20 SOLUTION ORAL at 09:12

## 2019-12-04 RX ADMIN — FUROSEMIDE 120 MG: 10 INJECTION, SOLUTION INTRAMUSCULAR; INTRAVENOUS at 05:12

## 2019-12-04 RX ADMIN — ASPIRIN 81 MG: 81 TABLET, COATED ORAL at 09:12

## 2019-12-04 RX ADMIN — TRAMADOL HYDROCHLORIDE 50 MG: 50 TABLET, FILM COATED ORAL at 12:12

## 2019-12-04 RX ADMIN — DOBUTAMINE HYDROCHLORIDE 2.5 MCG/KG/MIN: 200 INJECTION INTRAVENOUS at 09:12

## 2019-12-04 RX ADMIN — PANTOPRAZOLE SODIUM 40 MG: 40 TABLET, DELAYED RELEASE ORAL at 06:12

## 2019-12-04 RX ADMIN — FERROUS SULFATE TAB EC 325 MG (65 MG FE EQUIVALENT) 325 MG: 325 (65 FE) TABLET DELAYED RESPONSE at 09:12

## 2019-12-04 RX ADMIN — HYDRALAZINE HYDROCHLORIDE AND ISOSORBIDE DINITRATE 1 TABLET: 37.5; 2 TABLET, FILM COATED ORAL at 03:12

## 2019-12-04 RX ADMIN — FUROSEMIDE 120 MG: 10 INJECTION, SOLUTION INTRAMUSCULAR; INTRAVENOUS at 09:12

## 2019-12-04 RX ADMIN — WARFARIN SODIUM 5 MG: 5 TABLET ORAL at 03:12

## 2019-12-04 NOTE — PROGRESS NOTES
MD Ferrara notified of pt having 3 sets of VTach. Pt asymptomatic, asleep. No new orders given, will continue to monitor.

## 2019-12-04 NOTE — PLAN OF CARE
Discharge Recommendation: SNF.    1 goal met and 1 goal added today. PT goals appropriate.    Sabi Jones PT, DPT  2019  Pager: [phone]        Problem: Physical Therapy Goal  Goal: Physical Therapy Goal  Description  Goals to be met by: 2019    Patient will increase functional independence with mobility by performin. Supine to sit with Contact Guard Assistance - not met  2. Sit to stand transfer with Contact Guard Assistance using LRAD - met 19  2a. Sit to stand transfer with modified independence using LRAD - not met  3. Gait  x 50 feet with Contact Guard Assistance using LRAD - met 12/3/19  3a. Gait x 50 feet with Supervision using LRAD  4. Ascend/descend 3 stair with Minimal Assistance - not met  5. Stand for 3 minutes with Contact Guard Assistance using LRAD - not met  6. Lower extremity exercise program x15 reps per handout, with assistance as needed - not met      Outcome: Ongoing, Progressing

## 2019-12-04 NOTE — PROGRESS NOTES
[facility] Hospital Medicine  Progress Note    Primary Team: [facility]  Admit Date: 11/24/2019    Subjective:      HPI:   Mr. Terrell is a 54M HFrEF (EF 23%, moderate MR, moderate TR, diastolic dysfunction; multiple admissions in 2019, no ICD), with pulm HTN (PASP 47 mm Hg), Permanent AF/AFL s/p CTI with CVA in 2009 on Coumadin, CAD and obstructive sleep apnea who presented 11/24/19 with 1 week history of worsening SOB / MUÑOZ and 30 lb weight gain. Patient states compliance with fluid, salt and medication, However since last week having worsening LE edema with fluid weeping of his legs. Associated with MUÑOZ, Fatigue. He does have chronic severe anemia (refusing transfusion) with Hbg 6, additionally CXR demonstrating worsening RLL consolidation vs. Effusion. Case discussed with cardiology in ED, given Lasix IVP with diuril with minimal UOP, today patient having AMS / lethargy, worsening renal function with elevated BNP, Tbili trending up, with soft BP SBP 90s, requiring supplemental oxygen. Cardiology consulted for Inotropic support, on telemetry patient having NSVT with permanent Afib.He also has severe anemia with a hemoglobin of 6.2 which is consistent with his hemoglobin obtained on last admit on 10/23/2019.  The patient initially refuses blood products due to risk of transmission of infection.  Chest x-ray was done which showed increase lobulated oval opacity in the right mid lung field which is a new finding compared to chest x-ray done on 10/23/2019.  BNP was done and was significant for a value of 210.  The patient was initially given a mg of Lasix IV with minimal response and this was followed by 120 mg IV per Cardiology.    ECHO 11/17/2019  · Severely decreased left ventricular systolic function. The estimated ejection fraction is 25%  · Concentric left ventricular hypertrophy.  · Global hypokinetic wall motion.  · Moderate right ventricular enlargement.  · Moderately reduced right  ventricular systolic function.  · Severe biatrial enlargement.  · Moderate mitral regurgitation.  · Moderate tricuspid regurgitation.  · The estimated PA systolic pressure is 40 mm Hg  · Elevated central venous pressure (15 mm Hg).  · Atrial fibrillation observed.    Hospital course:  Admitted  for ADHF on IV Lasix. Weight increasing with UOP minimal and Cr worsening. Patient seen by nephrology and cardiology for assistance. Persistent symptoms as above, with additional lethargy without change in mental status. Patient was started on IV  on 11/26 as well as lasix gtt. Diuril augmentation added. Patient with persistent anemia despite blood transfusions. GI was consulted with concern for occult bleeding. Patient denies active bleeding and refuses rectal examination.  The patient's counts remain above 7 after 3U pRBC. Patient planned for endoscopic evaluation, with EGD and colonoscopy without evidence of active bleeding (the latter limited by suboptimal prep). The patient has had sustained runs of VT so cardiology was consulted but feel this is actually more c/w AF with RVR and thus  is continued.  Patient with stagnant UOP so lasix changed to IVP.     Interval history:   Patient with improving UOP on lasix IVP. He has no SOB. His blood counts dropped to <7 without active bleeding. He is in agreement with transfusion of his 4th pRBC today. All questions answered to patient satisfaction.    ROS:  As per HPI  General: no fever, no chills, no weight loss, no fatigue  Cardiovascular: no chest pain  Respiratory: no cough, no wheezes  All other systems reviewed & are negative.     Past Medical History:   Diagnosis Date    Anticoagulant long-term use     Cardiomegaly     Chronic combined systolic and diastolic congestive heart failure     Coronary artery disease     Heart attack 2006    Hypertension     Hyperthyroidism, subclinical 1/2/2013    MI (myocardial infarction) 9/22/2013    MI in 2009      Paroxysmal  atrial fibrillation     PE (pulmonary embolism) 2013    IN 2010     S/P ablation of atrial flutter 2008    Stroke 2009    no residual weaknesses     Past Surgical History:   Procedure Laterality Date    COLONOSCOPY N/A 2019    Procedure: COLONOSCOPY;  Surgeon: Scott Rosario MD;  Location: [address]);  Service: Endoscopy;  Laterality: N/A;    ESOPHAGOGASTRODUODENOSCOPY N/A 2019    Procedure: EGD (ESOPHAGOGASTRODUODENOSCOPY);  Surgeon: Scott Rosario MD;  Location: [facility] ([address] [place] [address]);  Service: Endoscopy;  Laterality: N/A;    RADIOFREQUENCY ABLATION  2008    for atrial flutter     Social History     Socioeconomic History    Marital status: Single     Spouse name: Not on file    Number of children: Not on file    Years of education: Not on file    Highest education level: Not on file   Occupational History    Not on file   Social Needs    Financial resource strain: Not on file    Food insecurity:     Worry: Not on file     Inability: Not on file    Transportation needs:     Medical: Not on file     Non-medical: Not on file   Tobacco Use    Smoking status: Never Smoker    Smokeless tobacco: Never Used   Substance and Sexual Activity    Alcohol use: No    Drug use: No    Sexual activity: Never   Lifestyle    Physical activity:     Days per week: Not on file     Minutes per session: Not on file    Stress: Not on file   Relationships    Social connections:     Talks on phone: Not on file     Gets together: Not on file     Attends [employer]oroastrian service: Not on file     Active member of club or organization: Not on file     Attends meetings of clubs or organizations: Not on file     Relationship status: Not on file   Other Topics Concern    Not on file   Social History Narrative    Not on file         Objective:   Last 24 Hour Vital Signs:  BP  Min: 106/64  Max: 138/75  Temp  Av.8 °F (36.6 °C)  Min: 96.6 °F (35.9 °C)  Max: 98.2 °F (36.8 °C)  Pulse  Av.7   Min: 82  Max: 125  Resp  Av.7  Min: 16  Max: 18  SpO2  Av.3 %  Min: 92 %  Max: 99 %  Weight  Av.5 kg (267 lb 12.9 oz)  Min: 121.2 kg (267 lb 1.4 oz)  Max: 121.8 kg (268 lb 8.3 oz)  I/O last 3 completed shifts:  In: 2700 [P.O.:2700]  Out: 2172 [Urine:2172]    Physical Examination:  GEN: AAOx3, NAD, appears mildly short of breath  HEENT: NCAT, MMM, PERRL, EOMI, oropharynx clear +JVD with HJS  CV: RRR, no m/r, no S3/S4, warm, well perfused  RESP: CTAB, no wheezes/crackles, no increased WOB  ABD: soft, NTND, normoactive BS, no organomegaly  EXTR: no c/c, intact distal pulses x 4, BLE edema  NEURO: PERRL, EOMI, moving all four extremities, intact sensation to light touch, no focal deficits  SKIN: no rashes, lesions, or color changes  PSYCH: normal affect    Laboratory:  I have reviewed all pertinent lab results/findings within the past 24 hours.    Radiology:  I have reviewed all pertinent imaging results/findings within the past 24 hours.    Current Medications:     Infusions:   DOBUTamine 2.5 mcg/kg/min (19)        Scheduled:   aspirin  81 mg Oral Daily    chlorothiazide (DIURIL) IVPB  1,000 mg Intravenous Q12H    ferrous sulfate  325 mg Oral Daily    fluticasone propionate  2 spray Each Nostril Daily    furosemide  120 mg Intravenous BID    gabapentin  100 mg Oral Q12H    isosorbide-hydrALAZINE 20-37.5 mg  1 tablet Oral TID    magnesium sulfate IVPB  2 g Intravenous Once    mirtazapine  7.5 mg Oral QHS    pantoprazole  40 mg Oral Before breakfast    senna-docusate 8.6-50 mg  1 tablet Oral BID    warfarin  5 mg Oral Daily        PRN:  sodium chloride, calcium carbonate, diphenhydrAMINE, melatonin, methyl salicylate-menthol 15-10%, ondansetron, promethazine (PHENERGAN) IVPB, sodium chloride 0.9%, traMADol    Prior records reviewed.    Assessment/Plan:     Hamlet Terrell is a 54 y.o.male with    Acute on chronic combined systolic and diastolic heart failure  Initial poor response  to 80 mg of Lasix and patient given Lasix 120 mg IV and Diuril 500 mg IV on admission per Cardiology.  EF 23%.  Added inotropic support with  2.5 mcg/kg/min  Monitor closely for arrhythmia with repletion K and Mg to 4.0/2.0 respectively  Change lasix to IVP  C/w diuril  Continue Bidil as tolerated  Hold lopressor    Abnormal CT  Abnormal opacity in middle lobe of right lung  Non-smoker  Obtain CT of the chest once more euvolemic (could not lay flat)     JEAN-PIERRE on CKD3  Cr 1.9 > 2.3 > 3.2 > 3.7 > 3.2 >2.6>2.5 (baseline 1.6-1.7)  Suspect cardiorenal  Monitor on diuresis  Continue to monitor electrolytes   Nephrology consulted, appreciate recommendations    Essential hypertension  Adjust antiHTNsives as above     Atrial fibrillation, chronic  Chronic anticoagulation  INR 2.3  Okay to c/w coumadin  Daily INR's  HR controlled. Hold metoprolol with  on board     Iron deficiency anemia  Patient with LANDON, initially refused blood tranfusions  Patient now agrees to blood transfusion - transfused 3U pRBC  C/w daily iron  GI consult appreciated; endoscopic evaluation - no active bleeding noted     History of CVA (cerebrovascular accident)  No acute management     Chronic respiratory failure  1.5 L NC at home  Reports malfunctioning O2 tank at home - consult CM     DVT PPx: coumadin  Diet: cardiac 1200 cc  FULL CODE    Markus Howard MD  Hospital Medicine Staff  [facility]

## 2019-12-04 NOTE — PLAN OF CARE
Plan of care discussed with patient. Patient is free of fall/trauma/injury. Denies CP, SOB, or pain/discomfort. Patient refuses to cooperate unless it is on his schedule with medications, working with PT, and lab draws. DBT gtts infusing at prescribed rate. Lasix IV push BID. Tele sitter in the room due to high falls risk. All questions addressed. Will continue to monitor

## 2019-12-04 NOTE — PT/OT/SLP PROGRESS
"Physical Therapy Treatment    Patient Name:  Hamlet Terrell   MRN:  [id number]  Admitting Diagnosis:  Acute on chronic combined systolic and diastolic heart failure   Recent Surgery: Procedure(s) (LRB):  EGD (ESOPHAGOGASTRODUODENOSCOPY) (N/A)  COLONOSCOPY (N/A) 2 Days Post-Op  Admit Date: 11/24/2019  Length of Stay: 10 days    Recommendations:     Discharge Recommendations:  nursing facility, skilled   Discharge Equipment Recommendations: walker, rolling   Barriers to discharge: None    Assessment:     Hamlet Terrell is a 54 y.o. male admitted with a medical diagnosis of Acute on chronic combined systolic and diastolic heart failure.  He presents with the following impairments/functional limitations:  weakness, impaired endurance, impaired functional mobilty, impaired self care skills, pain, impaired cardiopulmonary response to activity. Pt tolerated session well with improvement in level of assist needed for all transfers and mobility. Pt able to improve ambulation distance showing improved cardiopulmonary response to activity. Pt is still well below functional baseline. Pt will benefit from SNF after discharge from acute services in order to continue to progress pt's strength, endurance, and balance to help pt maximize independence at or near PLOF.    Rehab Prognosis: Good; patient would benefit from acute skilled PT services to address these deficits and reach maximum level of function.    Recent Surgery: Procedure(s) (LRB):  EGD (ESOPHAGOGASTRODUODENOSCOPY) (N/A)  COLONOSCOPY (N/A) 2 Days Post-Op    Plan:     During this hospitalization, patient to be seen 3 x/week to address the identified rehab impairments via gait training, therapeutic activities, therapeutic exercises, neuromuscular re-education and progress toward the following goals:    · Plan of Care Expires:  12/27/19    Subjective     RN notified prior to session. No family/friends present upon PT entrance into room.    Chief Complaint: "lets " "go"  Patient/Family Comments/goals: go home  Pain/Comfort:  · Pain Rating 1: 0/10  · Pain Addressed 1: Pre-medicate for activity  · Pain Rating Post-Intervention 1: 0/10      Objective:     Additional staff present: none    Patient found up in chair with: telemetry, peripheral IV   Mental Status: Patient is oriented to AxOx4 and follows multistep commands. Patient is Alert and Cooperative during session.    General Precautions: Standard, Cardiac fall   Orthopedic Precautions:N/A   Braces: N/A   Body mass index is 39.44 kg/m².  Oxygen Device: Room Air    Outcome Measures:  AM-PAC 6 CLICK MOBILITY  Turning over in bed (including adjusting bedclothes, sheets and blankets)?: 3  Sitting down on and standing up from a chair with arms (e.g., wheelchair, bedside commode, etc.): 3  Moving from lying on back to sitting on the side of the bed?: 3  Moving to and from a bed to a chair (including a wheelchair)?: 3  Need to walk in hospital room?: 3  Climbing 3-5 steps with a railing?: 3  Basic Mobility Total Score: 18     Functional Mobility:    Bed Mobility:   · Pt found/returned to bedside chair    Transfers:   · Sit <> Stand Transfer: supervision with no assistive device   · Stand <> Sit Transfer: supervision with no assistive device   · [id number] from bedside chair    Standing Balance:   Assistance Level Required: Stand-by Assistance      Gait:  · Patient ambulated: 200 ft   · Patient required: standy by assistance  · Patient used:  IV pole for balance   · Gait Pattern observed: reciprocal gait  · Gait Deviation(s): decreased step length, wide base of support, decreased weight shift, flexed posture and decreased jordan  · Impairments due to: decreased endurance  · all lines remained intact throughout ambulation trial  · Comments: Pt was able to perform vertical/horizontal head turns, 180 degree turns while ambulating, and avoid hallway obstacles without LOB. Pt stated he was not interested in trialing RW or SPC and stated he " "would rather use IV pole    Stairs:   Deferred due to pt's performance with above listed functional mobility    Education:   Time provided for education, counseling and discussion of health disposition in regards to patient's current status   All questions answered within PT scope of practice and to patient's satisfaction   PT role in POC to address current functional deficits   Pt educated on proper body mechanics, safety techniques, and energy conservation with PT facilitation and cueing throughout session   Call nursing/pct to transfer to chair/use bathroom. Pt stated understanding.   Whiteboard updated with pt's current mobility status documented above   Safe to perform step transfer to/from chair/bedside commode SBA and no AD w/ nursing/PCT present   Pt to ambulate 3x/day SBA and no AD with nsg/family in order to maintain functional mobility    Pt requesting "compression socks" and RN notified.    Patient left up in chair with all lines intact, call button in reach and RN notified.    GOALS:   Multidisciplinary Problems     Physical Therapy Goals        Problem: Physical Therapy Goal    Goal Priority Disciplines Outcome Goal Variances Interventions   Physical Therapy Goal     PT, PT/OT Ongoing, Progressing     Description:  Goals to be met by: 2019    Patient will increase functional independence with mobility by performin. Supine to sit with Contact Guard Assistance - not met  2. Sit to stand transfer with Contact Guard Assistance using LRAD - met 19  2a. Sit to stand transfer with modified independence using LRAD - not met  3. Gait  x 50 feet with Contact Guard Assistance using LRAD - met 12/3/19  3a. Gait x 50 feet with Supervision using LRAD  4. Ascend/descend 3 stair with Minimal Assistance - not met  5. Stand for 3 minutes with Contact Guard Assistance using LRAD - not met  6. Lower extremity exercise program x15 reps per handout, with assistance as needed - not met            "           Time Tracking:     PT Received On: 12/04/19  PT Start Time: 1440     PT Stop Time: 1451  PT Total Time (min): 11 min       Billable Minutes:   · Gait Training 9    Treatment Type: Treatment  PT/PTA: PT       Sabi Jones PT, DPT  12/4/2019  Pager: [phone]

## 2019-12-04 NOTE — PLAN OF CARE
Recommendations     Recommendation:   1. Continue cardiac diet as tolerated, with fluid restriction per MD.   2. RD to monitor & follow up.     Goals: Adequate PO intake to meet % of EEN and EPN by RD follow up  Nutrition Goal Status: new  Communication of RD Recs: other (comment)(POC)

## 2019-12-04 NOTE — ASSESSMENT & PLAN NOTE
- Patient seen and examined  - Doesn't seem to be diuresing over last 2 days as hshould somewhat resistant  - Stop Lasix gtt this PM  - From tommrow 120 IV BID Lasix + IV diuril 500 BID  - Electrolytes BID  - Repeat TTE, CXR, BNP   - If doesn't diruese call back Nephro may need dialysis

## 2019-12-04 NOTE — PROGRESS NOTES
"[facility]  Adult Nutrition  Progress Note    SUMMARY       Recommendations    Recommendation:   1. Continue cardiac diet as tolerated, with fluid restriction per MD.   2. RD to monitor & follow up.    Goals: Adequate PO intake to meet % of EEN and EPN by RD follow up  Nutrition Goal Status: new  Communication of RD Recs: other (comment)(POC)    Reason for Assessment    Reason For Assessment: RD follow-up  Diagnosis: cardiac disease(CHF)  Relevant Medical History: HTN, afib, CAD, stroke, MI  Interdisciplinary Rounds: did not attend  General Information Comments: Pt endorses good appetite/intake both currently and PTA. Pt endorses a 40# fluid wt gain PTA from his UBW of ~236#. Low sodium diet education provided 11/25. Pt appears nourished, NFPE not indicated at this time.  Nutrition Discharge Planning: Adequate PO intake on cardiac diet    Nutrition Risk Screen    Nutrition Risk Screen: no indicators present    Nutrition/Diet History    Spiritual, Cultural Beliefs, Gnosticist Practices, Values that Affect Care: no    Anthropometrics    Temp: 98.2 °F (36.8 °C)  Height Method: Stated  Height: 5' 9" (175.3 cm)  Height (inches): 69 in  Weight Method: Standard Scale  Weight: 121.2 kg (267 lb 1.4 oz)  Weight (lb): 267.09 lb  Ideal Body Weight (IBW), Male: 160 lb  % Ideal Body Weight, Male (lb): 170.17 lb  BMI (Calculated): 39.4    Lab/Procedures/Meds    Pertinent Labs Reviewed: reviewed  Pertinent Labs Comments: BUN 78, Cr 2.5, GFR 32.7  Pertinent Medications Reviewed: reviewed  Pertinent Medications Comments: calcium carbonate, dobutamine, ferrous sulfate, lasix, melatonin, mirtazapine, pantoprazole, phenergan, warfarin    Estimated/Assessed Needs    Weight Used For Calorie Calculations: 121.2 kg (267 lb 3.2 oz)  Energy Calorie Requirements (kcal): 2047 kcal/day  Energy Need Method: [place]-St Jeor(x 1.0 PAL 2/2 obesity)  Protein Requirements:  g/day(0.8-1 g/kg)  Weight Used For Protein " Calculations: 121.2 kg (267 lb 3.2 oz)  Fluid Requirements (mL): per MD or 1 mL/kcal     RDA Method (mL): 2047    Nutrition Prescription Ordered    Current Diet Order: Cardiac  Nutrition Order Comments: 1500 mL FR    Evaluation of Received Nutrient/Fluid Intake    I/O: -8.6L since admit  Energy Calories Required: meeting needs  Protein Required: meeting needs  Fluid Required: meeting needs  Comments: LBM 12/2  Tolerance: tolerating  % Intake of Estimated Energy Needs: 75 - 100 %  % Meal Intake: 75 - 100 %    Nutrition Risk    Level of Risk/Frequency of Follow-up: low     Assessment and Plan    Nutrition Problem  Obesity    Related to (etiology):   Excessive energy intake    Signs and Symptoms (as evidenced by):   BMI 39.44; pt with fluid overload, BMI 35 based on reported UBW of 236    Interventions (treatment strategy):  Collaboration with other providers    Nutrition Diagnosis Status:   New     Monitor and Evaluation    Food and Nutrient Intake: energy intake, food and beverage intake  Food and Nutrient Adminstration: diet order  Anthropometric Measurements: weight, weight change, body mass index  Biochemical Data, Medical Tests and Procedures: electrolyte and renal panel, gastrointestinal profile, glucose/endocrine profile, inflammatory profile, lipid profile  Nutrition-Focused Physical Findings: overall appearance     Malnutrition Assessment  Pt does not meet criteria for malnutrition at this time.    Nutrition Follow-Up    RD Follow-up?: Yes

## 2019-12-04 NOTE — PROGRESS NOTES
Spoke with MD Howard about ordered magnesium with no AM labs and magnesium level being 2.2 yesterday. Orders given to cancel IV magnesium replacement.

## 2019-12-04 NOTE — PLAN OF CARE
Goals reviewed and remain appropriate.  Pat Christianson OTR/L  Occupational Therapy  Pager #: [phone]  12/4/2019    Problem: Occupational Therapy Goal  Goal: Occupational Therapy Goal  Description  Goals to be met by 12/12/19:    Patient will increase functional independence with ADLs by performing:    UE Dressing with Set-up Assistance.  LE Dressing with Stand-by Assistance.  Grooming while standing with Stand-by Assistance.  Toileting from toilet with Stand-by Assistance for hygiene and clothing management.   Toilet transfer to toilet with Stand-by Assistance.     Outcome: Ongoing, Progressing

## 2019-12-04 NOTE — PT/OT/SLP PROGRESS
"Occupational Therapy   Treatment    Name: Hamlet Terrell  MRN: [id number]  Admitting Diagnosis:  Acute on chronic combined systolic and diastolic heart failure  2 Days Post-Op    Recommendations:     Discharge Recommendations: nursing facility, skilled  Discharge Equipment Recommendations:  (TBD)  Barriers to discharge:  Decreased caregiver support    Assessment:     Hamlet Terrell is a 54 y.o. male with a medical diagnosis of Acute on chronic combined systolic and diastolic heart failure. He presents with the following performance deficits affecting function: weakness, impaired endurance, impaired self care skills, impaired functional mobilty, pain, impaired balance, impaired cardiopulmonary response to activity. Patient presents with decreased use of R UE this session 2* c/o pain in R hand. Patient agreeable to therapy session and was able to ambulate ~150 feet with SBA. Patient continues to be limited by fatigue/SOB and requires rest periods during session. At this time, patient will continue to benefit from acute skilled therapy intervention to address deficits/underlying impairments and progress towards prior level of function. After discharge, patient would benefit from continued skilled therapy intervention at SNF to progress more towards independence in ADLs and functional mobility before going home.    Rehab Prognosis:  Good; patient would benefit from acute skilled OT services to address these deficits and reach maximum level of function.       Plan:     Patient to be seen 3 x/week to address the above listed problems via self-care/home management, therapeutic activities, therapeutic exercises  · Plan of Care Expires: 12/27/19  · Plan of Care Reviewed with: patient    Subjective     Pain/Comfort:  · Pain Rating 1: (Patient c/o pain in R hand but did not rate pain. Stated "it feels like a bunch of bees are stinging me")  · Location - Side 1: Right  · Location 1: hand  · Pain Addressed 1: Pre-medicate for " activity, Reposition, Distraction    Objective:     Communicated with: RN prior to session. Patient found up in chair with telemetry, peripheral IV upon OT entry to room.    General Precautions: Standard, fall   Orthopedic Precautions:N/A   Braces: N/A     Occupational Performance:     Bed Mobility:    · Not assessed; patient up in chair at start of session and returned to chair at end of session    Functional Mobility/Transfers:  · Patient completed Sit <> Stand Transfer 3x from bedside chair with supervision with no assistive device   · Functional Mobility: Patient ambulated in room, bathroom, and ~150 feet in hallway with SBA. Patient used IV pole for support. Patient required 1 standing rest period for ~45 sec when ambulating in hallway.    Activities of Daily Living:  · Grooming: stand by assistance and contact guard assistance 1x 2* loss of balance while standing at sink for 3 min 45 sec  · Lower Body Dressing: maximal assistance to tristian socks  · Toileting: independence using urinal    AMPAC 6 Click ADL: 17    Treatment & Education:  --Therapist provided facilitation and instruction of proper body mechanics, energy conservation, and fall prevention strategies during tasks listed above.  --Instructed patient to sit in bedside chair daily to increase OOB/activity tolerance.   --Educated patient on OT POC and answered all questions within OT scope of practice.  --Whiteboard updated    Patient left up in chair with all lines intact and call button in reachEducation:      GOALS:   Multidisciplinary Problems     Occupational Therapy Goals        Problem: Occupational Therapy Goal    Goal Priority Disciplines Outcome Interventions   Occupational Therapy Goal     OT, PT/OT Ongoing, Progressing    Description:  Goals to be met by 12/12/19:    Patient will increase functional independence with ADLs by performing:    UE Dressing with Set-up Assistance.  LE Dressing with Stand-by Assistance.  Grooming while standing with  Stand-by Assistance.  Toileting from toilet with Stand-by Assistance for hygiene and clothing management.   Toilet transfer to toilet with Stand-by Assistance.                      Time Tracking:     OT Date of Treatment: 12/04/19  OT Start Time: 1252  OT Stop Time: 1317  OT Total Time (min): 25 min    Billable Minutes:Self Care/Home Management 12  Therapeutic Activity 13    Pat Christianson, [place]  12/4/2019

## 2019-12-04 NOTE — SUBJECTIVE & OBJECTIVE
Interval History: Still vol overloaded however looks better up in chair off O2    Review of Systems   Constitution: Negative for chills, decreased appetite and diaphoresis.   HENT: Negative for congestion and ear discharge.    Eyes: Negative for blurred vision and discharge.   Cardiovascular: Negative for chest pain, dyspnea on exertion, irregular heartbeat, leg swelling and paroxysmal nocturnal dyspnea.   Respiratory: Positive for shortness of breath. Negative for cough and hemoptysis.    Gastrointestinal: Negative for abdominal pain.     Objective:     Vital Signs (Most Recent):  Temp: 97.7 °F (36.5 °C) (12/03/19 1947)  Pulse: 94 (12/03/19 1947)  Resp: 16 (12/03/19 1947)  BP: 132/60 (12/03/19 1947)  SpO2: 95 % (12/03/19 1947) Vital Signs (24h Range):  Temp:  [96.6 °F (35.9 °C)-98.4 °F (36.9 °C)] 97.7 °F (36.5 °C)  Pulse:  [] 94  Resp:  [16-18] 16  SpO2:  [91 %-95 %] 95 %  BP: (106-133)/(60-68) 132/60     Weight: 121.8 kg (268 lb 8.3 oz)  Body mass index is 39.65 kg/m².     SpO2: 95 %  O2 Device (Oxygen Therapy): room air      Intake/Output Summary (Last 24 hours) at 12/3/2019 2106  Last data filed at 12/3/2019 2048  Gross per 24 hour   Intake 1680 ml   Output 1647 ml   Net 33 ml       Lines/Drains/Airways     Peripheral Intravenous Line                 Peripheral IV - Single Lumen 12/02/19 1416 20 G Left Hand 1 day         Peripheral IV - Single Lumen 12/03/19 1524 20 G;1 3/4 in Left Forearm less than 1 day                Physical Exam   Constitutional: He is oriented to person, place, and time. He appears well-developed and well-nourished. No distress.   Eyes: Pupils are equal, round, and reactive to light. Conjunctivae are normal.   Neck: No JVD present. No tracheal deviation present. No thyromegaly present.   Cardiovascular: Normal rate, regular rhythm, normal heart sounds and intact distal pulses. Exam reveals no gallop and no friction rub.   No murmur heard.  Pulses:       Radial pulses are 2+ on the  right side, and 2+ on the left side.        Femoral pulses are 2+ on the right side, and 2+ on the left side.  Pulmonary/Chest: Effort normal and breath sounds normal. No respiratory distress. He has no wheezes. He has no rales.   Abdominal: Soft. Bowel sounds are normal. He exhibits no distension. There is no tenderness.   Musculoskeletal: He exhibits edema. He exhibits no deformity.   Neurological: He is alert and oriented to person, place, and time. No cranial nerve deficit. Coordination normal.   Skin: Skin is warm and dry. He is not diaphoretic.   Psychiatric: He has a normal mood and affect. His behavior is normal.       Significant Labs:   BMP:   Recent Labs   Lab 12/02/19  0334 12/03/19  0755 12/03/19  1408   GLU 98 100 108    137 138   K 3.8 3.5 3.6   CL 90* 90* 90*   CO2 30* 34* 38*   BUN 75* 77* 78*   CREATININE 2.6* 2.5* 2.5*   CALCIUM 9.7 9.8 10.0   MG 2.2 2.2 2.2       Significant Imaging: Echocardiogram:   Transthoracic echo (TTE) complete (Cupid Only):   Results for orders placed or performed during the hospital encounter of 11/24/19   Echo Color Flow Doppler? Yes; Bubble Contrast? No   Result Value Ref Range    Ascending aorta 3.81 cm    STJ 3.42 cm    IVRT 0.07 msec    IVS 1.11 (A) 0.6 - 1.1 cm    LA size 5.73 cm    Left Atrium Major Axis 7.45 cm    Left Atrium Minor Axis 7.84 cm    LVIDD 5.93 3.5 - 6.0 cm    LVIDS 5.38 (A) 2.1 - 4.0 cm    LVOT diameter 2.38 cm    LVOT peak VTI 18.35 cm    PW 1.49 (A) 0.6 - 1.1 cm    MV Peak E Karri 1.16 m/s    RA Major Axis 7.53 cm    RA Width 5.67 cm    RVDD 5.13 cm    Sinus 3.80 cm    TAPSE 1.38 cm    TR Max Karri 2.50 m/s    TDI LATERAL 0.09 m/s    TDI SEPTAL 0.05 m/s    LA WIDTH 5.12 cm    LV Diastolic Volume 175.36 mL    LV Systolic Volume 140.09 mL    LVOT peak karri 1.01 m/s    LV LATERAL E/E' RATIO 12.89 m/s    LV SEPTAL E/E' RATIO 23.20 m/s    FS 9 %    LA volume 190.52 cm3    LV mass 343.49 g    Left Ventricle Relative Wall Thickness 0.50 cm    Mean e'  0.07 m/s    LVOT area 4.4 cm2    LVOT stroke volume 81.59 cm3    E/E' ratio 16.57 m/s    LV Systolic Volume Index 58.8 mL/m2    LV Diastolic Volume Index 73.58 mL/m2    LA Volume Index 79.9 mL/m2    LV Mass Index 144 g/m2    Triscuspid Valve Regurgitation Peak Gradient 25 mmHg    BSA 2.49 m2    Right Atrial Pressure (from IVC) 15 mmHg    TV rest pulmonary artery pressure 40 mmHg    Narrative    · Severely decreased left ventricular systolic function. The estimated   ejection fraction is 25%  · Concentric left ventricular hypertrophy.  · Global hypokinetic wall motion.  · Moderate right ventricular enlargement.  · Moderately reduced right ventricular systolic function.  · Severe biatrial enlargement.  · Moderate mitral regurgitation.  · Moderate tricuspid regurgitation.  · The estimated PA systolic pressure is 40 mm Hg  · Elevated central venous pressure (15 mm Hg).  · Atrial fibrillation observed.

## 2019-12-04 NOTE — PHYSICIAN QUERY
PT Name: Hamlet Terrell  MR #: [id number]     CDS/: Ruth Berg RN             Contact information: [phone]  This form is a permanent document in the medical record.     Query Date: December 4, 2019    Physician Query - Neurological Condition Clarification    By submitting this query, we are merely seeking further clarification of documentation to reflect the severity of illness of your patient. Please utilize your independent clinical judgment when addressing the question(s) below.    The Medical record reflects the following:     Indicators   Supporting Clinical Findings Location in Medical Record   x AMS, Confusion,  LOC, etc.  Case discussed with cardiology in ED, given Lasix IVP with diuril with minimal UOP, today patient having AMS / lethargy, worsening renal function with elevated BNP, Tbili trending up, with soft BP SBP 90s, requiring supplemental oxygen.    Progress Note 11/26       Hospital Medicine            x Acute / Chronic Illness Unfortunately worsening renal function / hepatic function and now with encephalopathy with soft blood &pressure (sBP 90/50) concern for cardiogenic shock, on exam he is volume overloaded and warm to touch.       Acute liver injury    Consult 11/26       Cardiology          Consult 11/26       Cardiology              Radiology Findings      Electrolyte Imbalance      Medication      Treatment             x Other AST  21--> 13  ALT  11-->  9      Bilirubin total    2.0--> 3.3--> 2.5    Bilirubin , Direct      1.2    Albumin    3.5-> 3.3 Lab 11/24, 11/28        Lab 11/24, 11/28, 11/29    Lab  11/29    Lab  11/24, 11/28     Encephalopathy- is a general term for any diffuse disease of the brain that alters brain function or structure. Treatment of the cognitive dysfunction varies but is ultimately dependent on the treatment of the underlying condition.    Major Symptoms of Encephalopathy - Decreased level of consciousness, fluctuating alertness/concentration,  confusion, agitation, lethargy, somnolence, drowsiness, obtundation, stupor, or coma.         References: National Institutes of Healths (NIH) National [place] of Neurological Disorders and Strokes;  HCPro 2016; Advisory Board     Clinical Guidelines:   These guidelines will set system standards to assist providers in managing, documentation, and coding of encephalopathy. The intent of this document is to serve as a system guideline, not replace the providers clinical judgment:  Provider, please specify the diagnosis or diagnoses associated with above clinical findings.  [   ] Hepatic Encephalopathy - Due to liver disease/failure     [ x ] Unspecified Encephalopathy        [   ] Other Neurological Condition-  Includes Post-ictal altered mental status. (please specify condition): _________     [   ]  Clinically Undetermined     Please document in your progress notes daily for the duration of treatment until resolved, and include in your discharge summary.

## 2019-12-04 NOTE — PLAN OF CARE
Pt remained free of injuries, falls, and trauma. VSS. PRN pain medication administered. Continuous  infusing at prescribed rate.  Lasix drip d/c'd, pt transitioned to 120 mg IVP. Strict intake and output being monitored. Glucose monitored, 106 no insulin coverage needed. Plan of care reviewed with pt. Pt verbalized understanding. All questions and concerns addressed. No complaints mentioned at this time. Will continue to monitor.

## 2019-12-04 NOTE — PROGRESS NOTES
[facility]  Cardiology  Progress Note    Patient Name: Hamlet Terrell  MRN: [id number]  Admission Date: 11/24/2019  Hospital Length of Stay: 9 days  Code Status: Full Code   Attending Physician: Markus Howard MD   Primary Care Physician: Carlin Swift MD  Expected Discharge Date: 12/5/2019  Principal Problem:Acute on chronic combined systolic and diastolic heart failure    Subjective:     Hospital Course:   No notes on file    Interval History: Still vol overloaded however looks better up in chair off O2    Review of Systems   Constitution: Negative for chills, decreased appetite and diaphoresis.   HENT: Negative for congestion and ear discharge.    Eyes: Negative for blurred vision and discharge.   Cardiovascular: Negative for chest pain, dyspnea on exertion, irregular heartbeat, leg swelling and paroxysmal nocturnal dyspnea.   Respiratory: Positive for shortness of breath. Negative for cough and hemoptysis.    Gastrointestinal: Negative for abdominal pain.     Objective:     Vital Signs (Most Recent):  Temp: 97.7 °F (36.5 °C) (12/03/19 1947)  Pulse: 94 (12/03/19 1947)  Resp: 16 (12/03/19 1947)  BP: 132/60 (12/03/19 1947)  SpO2: 95 % (12/03/19 1947) Vital Signs (24h Range):  Temp:  [96.6 °F (35.9 °C)-98.4 °F (36.9 °C)] 97.7 °F (36.5 °C)  Pulse:  [] 94  Resp:  [16-18] 16  SpO2:  [91 %-95 %] 95 %  BP: (106-133)/(60-68) 132/60     Weight: 121.8 kg (268 lb 8.3 oz)  Body mass index is 39.65 kg/m².     SpO2: 95 %  O2 Device (Oxygen Therapy): room air      Intake/Output Summary (Last 24 hours) at 12/3/2019 2106  Last data filed at 12/3/2019 2048  Gross per 24 hour   Intake 1680 ml   Output 1647 ml   Net 33 ml       Lines/Drains/Airways     Peripheral Intravenous Line                 Peripheral IV - Single Lumen 12/02/19 1416 20 G Left Hand 1 day         Peripheral IV - Single Lumen 12/03/19 1524 20 G;1 3/4 in Left Forearm less than 1 day                Physical Exam   Constitutional: He is  oriented to person, place, and time. He appears well-developed and well-nourished. No distress.   Eyes: Pupils are equal, round, and reactive to light. Conjunctivae are normal.   Neck: No JVD present. No tracheal deviation present. No thyromegaly present.   Cardiovascular: Normal rate, regular rhythm, normal heart sounds and intact distal pulses. Exam reveals no gallop and no friction rub.   No murmur heard.  Pulses:       Radial pulses are 2+ on the right side, and 2+ on the left side.        Femoral pulses are 2+ on the right side, and 2+ on the left side.  Pulmonary/Chest: Effort normal and breath sounds normal. No respiratory distress. He has no wheezes. He has no rales.   Abdominal: Soft. Bowel sounds are normal. He exhibits no distension. There is no tenderness.   Musculoskeletal: He exhibits edema. He exhibits no deformity.   Neurological: He is alert and oriented to person, place, and time. No cranial nerve deficit. Coordination normal.   Skin: Skin is warm and dry. He is not diaphoretic.   Psychiatric: He has a normal mood and affect. His behavior is normal.       Significant Labs:   BMP:   Recent Labs   Lab 12/02/19  0334 12/03/19  0755 12/03/19  1408   GLU 98 100 108    137 138   K 3.8 3.5 3.6   CL 90* 90* 90*   CO2 30* 34* 38*   BUN 75* 77* 78*   CREATININE 2.6* 2.5* 2.5*   CALCIUM 9.7 9.8 10.0   MG 2.2 2.2 2.2       Significant Imaging: Echocardiogram:   Transthoracic echo (TTE) complete (Cupid Only):   Results for orders placed or performed during the hospital encounter of 11/24/19   Echo Color Flow Doppler? Yes; Bubble Contrast? No   Result Value Ref Range    Ascending aorta 3.81 cm    STJ 3.42 cm    IVRT 0.07 msec    IVS 1.11 (A) 0.6 - 1.1 cm    LA size 5.73 cm    Left Atrium Major Axis 7.45 cm    Left Atrium Minor Axis 7.84 cm    LVIDD 5.93 3.5 - 6.0 cm    LVIDS 5.38 (A) 2.1 - 4.0 cm    LVOT diameter 2.38 cm    LVOT peak VTI 18.35 cm    PW 1.49 (A) 0.6 - 1.1 cm    MV Peak E Karri 1.16 m/s    RA  Major Axis 7.53 cm    RA Width 5.67 cm    RVDD 5.13 cm    Sinus 3.80 cm    TAPSE 1.38 cm    TR Max Karri 2.50 m/s    TDI LATERAL 0.09 m/s    TDI SEPTAL 0.05 m/s    LA WIDTH 5.12 cm    LV Diastolic Volume 175.36 mL    LV Systolic Volume 140.09 mL    LVOT peak karri 1.01 m/s    LV LATERAL E/E' RATIO 12.89 m/s    LV SEPTAL E/E' RATIO 23.20 m/s    FS 9 %    LA volume 190.52 cm3    LV mass 343.49 g    Left Ventricle Relative Wall Thickness 0.50 cm    Mean e' 0.07 m/s    LVOT area 4.4 cm2    LVOT stroke volume 81.59 cm3    E/E' ratio 16.57 m/s    LV Systolic Volume Index 58.8 mL/m2    LV Diastolic Volume Index 73.58 mL/m2    LA Volume Index 79.9 mL/m2    LV Mass Index 144 g/m2    Triscuspid Valve Regurgitation Peak Gradient 25 mmHg    BSA 2.49 m2    Right Atrial Pressure (from IVC) 15 mmHg    TV rest pulmonary artery pressure 40 mmHg    Narrative    · Severely decreased left ventricular systolic function. The estimated   ejection fraction is 25%  · Concentric left ventricular hypertrophy.  · Global hypokinetic wall motion.  · Moderate right ventricular enlargement.  · Moderately reduced right ventricular systolic function.  · Severe biatrial enlargement.  · Moderate mitral regurgitation.  · Moderate tricuspid regurgitation.  · The estimated PA systolic pressure is 40 mm Hg  · Elevated central venous pressure (15 mm Hg).  · Atrial fibrillation observed.        Assessment and Plan:       * Acute on chronic combined systolic and diastolic heart failure  - Patient seen and examined  - Doesn't seem to be diuresing over last 2 days as hshould somewhat resistant  - Stop Lasix gtt this PM  - From tommrow 120 IV BID Lasix + IV diuril 500 BID  - Electrolytes BID  - Repeat TTE, CXR, BNP   - If doesn't diruese call back Nephro may need dialysis          VTE Risk Mitigation (From admission, onward)         Ordered     warfarin (COUMADIN) tablet 5 mg  Daily      12/03/19 1512     Reason for No Pharmacological VTE Prophylaxis  Once      Question:  Reasons:  Answer:  Already adequately anticoagulated on oral Anticoagulants    11/24/19 1650     IP VTE HIGH RISK PATIENT  Once      11/24/19 1650     Place sequential compression device  Until discontinued      11/24/19 1650     Place CAMI hose  Until discontinued      11/24/19 1650                Nan Murrell MD  Cardiology  [facility]

## 2019-12-05 LAB
ANION GAP SERPL CALC-SCNC: 11 MMOL/L (ref 8–16)
BUN SERPL-MCNC: 82 MG/DL (ref 6–20)
CALCIUM SERPL-MCNC: 10 MG/DL (ref 8.7–10.5)
CHLORIDE SERPL-SCNC: 93 MMOL/L (ref 95–110)
CO2 SERPL-SCNC: 36 MMOL/L (ref 23–29)
CREAT SERPL-MCNC: 2.2 MG/DL (ref 0.5–1.4)
EST. GFR  (AFRICAN AMERICAN): 38.1 ML/MIN/1.73 M^2
EST. GFR  (NON AFRICAN AMERICAN): 33 ML/MIN/1.73 M^2
GLUCOSE SERPL-MCNC: 117 MG/DL (ref 70–110)
MAGNESIUM SERPL-MCNC: 2 MG/DL (ref 1.6–2.6)
POTASSIUM SERPL-SCNC: 3.8 MMOL/L (ref 3.5–5.1)
SODIUM SERPL-SCNC: 140 MMOL/L (ref 136–145)

## 2019-12-05 PROCEDURE — 99233 SBSQ HOSP IP/OBS HIGH 50: CPT | Mod: ,,, | Performed by: HOSPITALIST

## 2019-12-05 PROCEDURE — 20600001 HC STEP DOWN PRIVATE ROOM

## 2019-12-05 PROCEDURE — 63600175 PHARM REV CODE 636 W HCPCS: Performed by: STUDENT IN AN ORGANIZED HEALTH CARE EDUCATION/TRAINING PROGRAM

## 2019-12-05 PROCEDURE — 97530 THERAPEUTIC ACTIVITIES: CPT

## 2019-12-05 PROCEDURE — 99233 PR SUBSEQUENT HOSPITAL CARE,LEVL III: ICD-10-PCS | Mod: ,,, | Performed by: HOSPITALIST

## 2019-12-05 PROCEDURE — 36415 COLL VENOUS BLD VENIPUNCTURE: CPT

## 2019-12-05 PROCEDURE — 25000003 PHARM REV CODE 250: Performed by: HOSPITALIST

## 2019-12-05 PROCEDURE — 80048 BASIC METABOLIC PNL TOTAL CA: CPT

## 2019-12-05 PROCEDURE — 83735 ASSAY OF MAGNESIUM: CPT

## 2019-12-05 RX ADMIN — ASPIRIN 81 MG: 81 TABLET, COATED ORAL at 08:12

## 2019-12-05 RX ADMIN — CHLOROTHIAZIDE SODIUM 1000 MG: 500 INJECTION, POWDER, LYOPHILIZED, FOR SOLUTION INTRAVENOUS at 10:12

## 2019-12-05 RX ADMIN — HYDRALAZINE HYDROCHLORIDE AND ISOSORBIDE DINITRATE 1 TABLET: 37.5; 2 TABLET, FILM COATED ORAL at 08:12

## 2019-12-05 RX ADMIN — HYDRALAZINE HYDROCHLORIDE AND ISOSORBIDE DINITRATE 1 TABLET: 37.5; 2 TABLET, FILM COATED ORAL at 02:12

## 2019-12-05 RX ADMIN — FERROUS SULFATE TAB EC 325 MG (65 MG FE EQUIVALENT) 325 MG: 325 (65 FE) TABLET DELAYED RESPONSE at 08:12

## 2019-12-05 RX ADMIN — FUROSEMIDE 120 MG: 10 INJECTION, SOLUTION INTRAMUSCULAR; INTRAVENOUS at 06:12

## 2019-12-05 RX ADMIN — GABAPENTIN 100 MG: 250 SOLUTION ORAL at 08:12

## 2019-12-05 RX ADMIN — FUROSEMIDE 120 MG: 10 INJECTION, SOLUTION INTRAMUSCULAR; INTRAVENOUS at 08:12

## 2019-12-05 RX ADMIN — WARFARIN SODIUM 5 MG: 5 TABLET ORAL at 06:12

## 2019-12-05 NOTE — NURSING
"Patient is reporting the "shakes."  Patient stated, "I get like this after my blood transfusions.  It is a lot when I get them and it is a little when I don't," will notify team.  "

## 2019-12-05 NOTE — NURSING
"Patient refused labs x 2.  MELISA Howard discussed need with patient.  Patient still refused labs. Changed PIV dressing in hand.  Explained to patient if PIV is wrapped with Coban it will remain intact longer.  Patient stated, "Just let it hang."    "

## 2019-12-05 NOTE — PROGRESS NOTES
[facility] Hospital Medicine  Progress Note    Primary Team: [facility]  Admit Date: 11/24/2019    Subjective:      HPI:   Mr. Terrell is a 54M HFrEF (EF 23%, moderate MR, moderate TR, diastolic dysfunction; multiple admissions in 2019, no ICD), with pulm HTN (PASP 47 mm Hg), Permanent AF/AFL s/p CTI with CVA in 2009 on Coumadin, CAD and obstructive sleep apnea who presented 11/24/19 with 1 week history of worsening SOB / MUÑOZ and 30 lb weight gain. Patient states compliance with fluid, salt and medication, However since last week having worsening LE edema with fluid weeping of his legs. Associated with MUÑOZ, Fatigue. He does have chronic severe anemia (refusing transfusion) with Hbg 6, additionally CXR demonstrating worsening RLL consolidation vs. Effusion. Case discussed with cardiology in ED, given Lasix IVP with diuril with minimal UOP, today patient having AMS / lethargy, worsening renal function with elevated BNP, Tbili trending up, with soft BP SBP 90s, requiring supplemental oxygen. Cardiology consulted for Inotropic support, on telemetry patient having NSVT with permanent Afib.He also has severe anemia with a hemoglobin of 6.2 which is consistent with his hemoglobin obtained on last admit on 10/23/2019.  The patient initially refuses blood products due to risk of transmission of infection.  Chest x-ray was done which showed increase lobulated oval opacity in the right mid lung field which is a new finding compared to chest x-ray done on 10/23/2019.  BNP was done and was significant for a value of 210.  The patient was initially given a mg of Lasix IV with minimal response and this was followed by 120 mg IV per Cardiology.    ECHO 11/17/2019  · Severely decreased left ventricular systolic function. The estimated ejection fraction is 25%  · Concentric left ventricular hypertrophy.  · Global hypokinetic wall motion.  · Moderate right ventricular enlargement.  · Moderately reduced right  ventricular systolic function.  · Severe biatrial enlargement.  · Moderate mitral regurgitation.  · Moderate tricuspid regurgitation.  · The estimated PA systolic pressure is 40 mm Hg  · Elevated central venous pressure (15 mm Hg).  · Atrial fibrillation observed.    Hospital course:  Admitted  for ADHF on IV Lasix. Weight increasing with UOP minimal and Cr worsening. Patient seen by nephrology and cardiology for assistance. Persistent symptoms as above, with additional lethargy without change in mental status. Patient was started on IV  on 11/26 as well as lasix gtt. Diuril augmentation added. Patient with persistent anemia despite blood transfusions. GI was consulted with concern for occult bleeding. Patient denies active bleeding and refuses rectal examination.  The patient's counts remain above 7 after 3U pRBC. Patient planned for endoscopic evaluation, with EGD and colonoscopy without evidence of active bleeding (the latter limited by suboptimal prep). The patient has had sustained runs of VT so cardiology was consulted but feel this is actually more c/w AF with RVR and thus  is continued.  Patient with stagnant UOP so lasix changed to IVP.     Interval history:   Patient with improving UOP on lasix IVP. He has no SOB. His blood counts dropped to <7 without active bleeding. He is in agreement with transfusion of his 4th pRBC yesterday.  He reports dark stool but this may be due to iron supplementation. Await today's labs. All questions answered to patient satisfaction.    ROS:  As per HPI  General: no fever, no chills, no weight loss, no fatigue  Cardiovascular: no chest pain  Respiratory: no cough, no wheezes  All other systems reviewed & are negative.     Past Medical History:   Diagnosis Date    Anticoagulant long-term use     Cardiomegaly     Chronic combined systolic and diastolic congestive heart failure     Coronary artery disease     Heart attack 2006    Hypertension     Hyperthyroidism,  subclinical 1/2/2013    MI (myocardial infarction) 9/22/2013    MI in 2009      Paroxysmal atrial fibrillation     PE (pulmonary embolism) 1/1/2013    IN 2010     S/P ablation of atrial flutter 2008    Stroke 2009    no residual weaknesses     Past Surgical History:   Procedure Laterality Date    COLONOSCOPY N/A 12/2/2019    Procedure: COLONOSCOPY;  Surgeon: Scott Rosario MD;  Location: [facility] ([address] [place] [address]);  Service: Endoscopy;  Laterality: N/A;    ESOPHAGOGASTRODUODENOSCOPY N/A 12/2/2019    Procedure: EGD (ESOPHAGOGASTRODUODENOSCOPY);  Surgeon: Scott Rosario MD;  Location: [facility] ([address] [place] [address]);  Service: Endoscopy;  Laterality: N/A;    RADIOFREQUENCY ABLATION  01/08/2008    for atrial flutter     Social History     Socioeconomic History    Marital status: Single     Spouse name: Not on file    Number of children: Not on file    Years of education: Not on file    Highest education level: Not on file   Occupational History    Not on file   Social Needs    Financial resource strain: Not on file    Food insecurity:     Worry: Not on file     Inability: Not on file    Transportation needs:     Medical: Not on file     Non-medical: Not on file   Tobacco Use    Smoking status: Never Smoker    Smokeless tobacco: Never Used   Substance and Sexual Activity    Alcohol use: No    Drug use: No    Sexual activity: Never   Lifestyle    Physical activity:     Days per week: Not on file     Minutes per session: Not on file    Stress: Not on file   Relationships    Social connections:     Talks on phone: Not on file     Gets together: Not on file     Attends Mu-ism service: Not on file     Active member of club or organization: Not on file     Attends meetings of clubs or organizations: Not on file     Relationship status: Not on file   Other Topics Concern    Not on file   Social History Narrative    Not on file         Objective:   Last 24 Hour Vital Signs:  BP  Min: 103/58  Max: 146/78  Temp   Av.8 °F (36.6 °C)  Min: 97.4 °F (36.3 °C)  Max: 98.5 °F (36.9 °C)  Pulse  Av.9  Min: 85  Max: 128  Resp  Av.6  Min: 16  Max: 20  SpO2  Av.1 %  Min: 90 %  Max: 97 %  Weight  Av.1 kg (264 lb 12.4 oz)  Min: 120.1 kg (264 lb 12.4 oz)  Max: 120.1 kg (264 lb 12.4 oz)  I/O last 3 completed shifts:  In: 2100 [P.O.:2100]  Out: 4370 [Urine:4370]    Physical Examination:  GEN: AAOx3, NAD, appears mildly short of breath  HEENT: NCAT, MMM, PERRL, EOMI, oropharynx clear +JVD with HJS  CV: RRR, no m/r, no S3/S4, warm, well perfused  RESP: CTAB, no wheezes/crackles, no increased WOB  ABD: soft, NTND, normoactive BS, no organomegaly  EXTR: no c/c, intact distal pulses x 4, BLE edema  NEURO: PERRL, EOMI, moving all four extremities, intact sensation to light touch, no focal deficits  SKIN: no rashes, lesions, or color changes  PSYCH: normal affect    Laboratory:  I have reviewed all pertinent lab results/findings within the past 24 hours.    Radiology:  I have reviewed all pertinent imaging results/findings within the past 24 hours.    Current Medications:     Infusions:   DOBUTamine 2.5 mcg/kg/min (19)        Scheduled:   aspirin  81 mg Oral Daily    chlorothiazide (DIURIL) IVPB  1,000 mg Intravenous Q12H    ferrous sulfate  325 mg Oral Daily    fluticasone propionate  2 spray Each Nostril Daily    furosemide  120 mg Intravenous BID    gabapentin  100 mg Oral Q12H    isosorbide-hydrALAZINE 20-37.5 mg  1 tablet Oral TID    magnesium sulfate IVPB  2 g Intravenous Once    mirtazapine  7.5 mg Oral QHS    pantoprazole  40 mg Oral Before breakfast    senna-docusate 8.6-50 mg  1 tablet Oral BID    warfarin  5 mg Oral Daily        PRN:  sodium chloride, sodium chloride, calcium carbonate, diphenhydrAMINE, melatonin, methyl salicylate-menthol 15-10%, ondansetron, promethazine (PHENERGAN) IVPB, sodium chloride 0.9%, traMADol    Prior records reviewed.    Assessment/Plan:     Hamlet Terrell  is a 54 y.o.male with    Acute on chronic combined systolic and diastolic heart failure  Initial poor response to 80 mg of Lasix and patient given Lasix 120 mg IV and Diuril 500 mg IV on admission per Cardiology.  EF 23%.  Added inotropic support with  2.5 mcg/kg/min  Monitor closely for arrhythmia with repletion K and Mg to 4.0/2.0 respectively  Changed lasix to IVP  C/w diuril  Continue Bidil as tolerated  Hold lopressor    Abnormal CT  Abnormal opacity in middle lobe of right lung  Non-smoker  Obtain CT of the chest once more euvolemic (could not lay flat)     JEAN-PIERRE on CKD3  Cr 1.9 > 2.3 > 3.2 > 3.7 > 3.2 >2.6>2.5 (baseline 1.6-1.7)  Suspect cardiorenal  Monitor on diuresis  Continue to monitor electrolytes   Nephrology consulted, appreciate recommendations    Essential hypertension  Adjust antiHTNsives as above     Atrial fibrillation, chronic  Chronic anticoagulation  INR 2.3  Okay to c/w coumadin  Daily INR's  HR controlled. Hold metoprolol with  on board     Iron deficiency anemia  Patient with LANDON, initially refused blood tranfusions  Patient now agrees to blood transfusion - transfused 4U pRBC  C/w daily iron  GI consult appreciated; endoscopic evaluation - no active bleeding noted     History of CVA (cerebrovascular accident)  No acute management     Chronic respiratory failure  1.5 L NC at home  Reports malfunctioning O2 tank at home - consult CM     DVT PPx: coumadin  Diet: cardiac 1200 cc  FULL CODE    Maruks Howard MD  Hospital Medicine Staff  [facility]

## 2019-12-05 NOTE — PT/OT/SLP PROGRESS
Physical Therapy Treatment    Patient Name:  Hamlet Terrell   MRN:  [id number]  Admit Date: 11/24/2019  Admitting Diagnosis:  Acute on chronic combined systolic and diastolic heart failure   Length of Stay: 11 days  Recent Surgery: Procedure(s) (LRB):  EGD (ESOPHAGOGASTRODUODENOSCOPY) (N/A)  COLONOSCOPY (N/A) 3 Days Post-Op    Recommendations:     Discharge Recommendations:  home health PT(updated from SNF due to improved balance and endurance.)   Discharge Equipment Recommendations: none   Barriers to discharge: Decreased caregiver support    Plan:     During this hospitalization, patient to be seen 3 x/week to address the listed problems via gait training, therapeutic activities, therapeutic exercises, neuromuscular re-education  · Plan of Care Expires:  12/27/19   Plan of Care Reviewed with: patient    Assessment:     Hamlet Terrell is a 54 y.o. male admitted with a medical diagnosis of Acute on chronic combined systolic and diastolic heart failure.   Patient is making progress towards goals, participating well in this session.  Pt with noted improvement with endurance, mobility & balance. Hamlet Maries deficits effect their ability to safely and independently participate in self-care tasks and functional mobility which increases their caregiver burden. Due to his physical therapy diagnosis of debility and deconditioning, they continue to benefit from acute PT services to address the following limitations: high fall risk, weakness, instability, the need for supervised instruction of exercise. Hamlet Maries deficits effect their roles and responsibilities in which they were able to complete prior to admit. Education was provided to patient regarding importance of continued participation in therapy, patient's progress and discharge disposition. Pt would benefit from a collaborative PT/OT/SLP program to improve quality of life and focus on recovery of impairments.     Problem List: impaired cardiopulmonary  "response to activity.  Rehab Prognosis: Good     GOALS:   Multidisciplinary Problems     Physical Therapy Goals        Problem: Physical Therapy Goal    Goal Priority Disciplines Outcome Goal Variances Interventions   Physical Therapy Goal     PT, PT/OT Ongoing, Progressing     Description:  Goals to be met by: 2019    Patient will increase functional independence with mobility by performin. Supine to sit with Contact Guard Assistance - not met  2. Sit to stand transfer with Contact Guard Assistance using LRAD - met 19  2a. Sit to stand transfer with modified independence using LRAD - not met  3. Gait  x 50 feet with Contact Guard Assistance using LRAD - met 12/3/19  3a. Gait x 50 feet with Supervision using LRAD  4. Ascend/descend 3 stair with Minimal Assistance - not met  5. Stand for 3 minutes with Contact Guard Assistance using LRAD - not met  6. Lower extremity exercise program x15 reps per handout, with assistance as needed - not met                        Subjective   Communicated with RN prior to session.  Patient found up in chair upon PT entry to room, agreeable to evaluation. Hamlet Terrell's alone present during session.    Chief Complaint: none stated  Patient/Family Comments/goals: "My feet are still swollen, its better than yesterday."  Pain/Comfort:  · Pain Rating 1: 0/10  · Pain Rating Post-Intervention 1: 0/10    Objective:   Patient found with: telemetry, peripheral IV   Mental Status: Patient is oriented to AxOx4 and follows all verbal, multi-step commands. Patient is Alert and Cooperative during session.    General Precautions: Standard, Cardiac fall   Orthopedic Precautions:N/A   Braces: N/A   Oxygen Device: Room Air  Vitals: /78 (BP Location: Right arm, Patient Position: Sitting)   Pulse (!) 128   Temp 97.5 °F (36.4 °C) (Oral)   Resp 16   Ht 5' 9" (1.753 m)   Wt 120.1 kg (264 lb 12.4 oz)   SpO2 96%   BMI 39.10 kg/m²     Outcome Measures:  AM-PAC 6 CLICK " MOBILITY  Turning over in bed (including adjusting bedclothes, sheets and blankets)?: 3  Sitting down on and standing up from a chair with arms (e.g., wheelchair, bedside commode, etc.): 3  Moving from lying on back to sitting on the side of the bed?: 3  Moving to and from a bed to a chair (including a wheelchair)?: 3  Need to walk in hospital room?: 3  Climbing 3-5 steps with a railing?: 3  Basic Mobility Total Score: 18     Functional Mobility:  Additional staff present: N/A  Bed Mobility:   · Pt found/returned to bedside chair    Transfers:    Sit <> Stand Transfer: supervision with no assistive device    Stand <> Sit Transfer: supervision with no assistive device   [id number] from bedside chair      Gait:  · Patient ambulated: 200ft   · Patient required: standy by assistance  · Patient used:  no assistive device   · Gait Pattern observed: reciprocal gait  · Gait Speed:  · 0.20m/s - 0.39m/s - Household Walker  · Normal gait speed for average healthy adult: 1.2m/s-1.4m/s  · Gait Deviation(s): wide base of support, decreased weight shift, decreased jordan and increased lateral sway due to edema  · Impairments due to: impaired balance and decreased endurance    Therapeutic Activities & Exercises:   Education:  Patient provided with daily orientation and goals of this PT session. Patient agreed to participate in session. Patient aware of patient's deficits and therapy progression. They were educated to transfer to bedside chair/bedside commode/bathroom with RN/PCT present. Encouraged patient to perform daily exercises & mobility to increase endurance and decrease effects of bedrest.Time provided for therapeutic counseling and discussion of health disposition. All questions answered to patient's satisfaction, within scope of PT practice; voiced no other concerns. White board updated in patient's room, RN notified of session.    Patient left up in chair, with head in midline, neutral pelvis & heels floated for skin  protection with all lines intact, call button in reach and RN notified      Time Tracking:     PT Received On: 12/05/19  PT Start Time: 1351     PT Stop Time: 1359  PT Total Time (min): 8 min     Billable Minutes:   · Therapeutic Activity 8    Treatment Type: Treatment  PT/PTA: PT       Dali Estrella PT, DPT  12/05/2019  Pager: [phone]

## 2019-12-05 NOTE — NURSING
Rosa Isela  at bedside.  Patient requested to be stuck in upper right/left arm.   was not able to draw specimen, will notify team.

## 2019-12-05 NOTE — PLAN OF CARE
Pt remained free of injuries, falls, and trauma. VSS. PRN pain medication administered. Continuous  infusing at prescribed rate.  Lasix drip d/c'd, pt transitioned to 120 mg IVP. Strict intake and output being monitored. 1 unit of PRBC administered, H/H 6.7/23.7.Glucose monitored,  no insulin coverage needed. Plan of care reviewed with pt. Pt verbalized understanding. All questions and concerns addressed. No complaints mentioned at this time. Will continue to monitor.

## 2019-12-05 NOTE — PROGRESS NOTES
Pt c/o drowsiness. Pt has blood infusing at the moment. RN reviewed all blood transfusion reactions with pt. Pt instructed he wants infusion to be stopped. MD Douglas Lenz notified. Instructed RN to inform pt that drowsiness is not a transfusion reaction and pt really needs to complete infusion d/t Hbg 6.7 but the pt does have the right for the infusion to be stopped. Pt stated he will try to complete the infusion. Will continue to monitor.

## 2019-12-05 NOTE — PLAN OF CARE
Reviewed plan of care with patient.  Patient is alert, oriented x 4, independent, ambulatory w/standby assist, and runs Afib on the monitor.  Patient is a fall risk,Telesitter activated, but refuses to comply with standby assist protocol.  DOBUTamine gtt infusing @ 2.5 mcq/kg/min.  Lasix 120 mg IVP BID.  Diuril 100 mg/50 ml IVPB q12h.  2-3+ Bilateral lower extremity edema noted.  Patient refused labs today.  Strict I&O's. Fluid Restriction:  1500 ml/day.  Daily weights.  Bedside commode in room, but patient does not use it.  Patient has remained free of falls/trauma/injury.  Patient verbalized understanding of all instructions.

## 2019-12-06 LAB
ANION GAP SERPL CALC-SCNC: 14 MMOL/L (ref 8–16)
BASOPHILS # BLD AUTO: 0.07 K/UL (ref 0–0.2)
BASOPHILS NFR BLD: 0.9 % (ref 0–1.9)
BUN SERPL-MCNC: 86 MG/DL (ref 6–20)
CALCIUM SERPL-MCNC: 10 MG/DL (ref 8.7–10.5)
CHLORIDE SERPL-SCNC: 94 MMOL/L (ref 95–110)
CO2 SERPL-SCNC: 35 MMOL/L (ref 23–29)
CREAT SERPL-MCNC: 2.3 MG/DL (ref 0.5–1.4)
DIFFERENTIAL METHOD: ABNORMAL
EOSINOPHIL # BLD AUTO: 0.7 K/UL (ref 0–0.5)
EOSINOPHIL NFR BLD: 9.1 % (ref 0–8)
ERYTHROCYTE [DISTWIDTH] IN BLOOD BY AUTOMATED COUNT: 25.3 % (ref 11.5–14.5)
EST. GFR  (AFRICAN AMERICAN): 36.1 ML/MIN/1.73 M^2
EST. GFR  (NON AFRICAN AMERICAN): 31.3 ML/MIN/1.73 M^2
GLUCOSE SERPL-MCNC: 105 MG/DL (ref 70–110)
HCT VFR BLD AUTO: 25.5 % (ref 40–54)
HGB BLD-MCNC: 7.4 G/DL (ref 14–18)
IMM GRANULOCYTES # BLD AUTO: 0.05 K/UL (ref 0–0.04)
IMM GRANULOCYTES NFR BLD AUTO: 0.6 % (ref 0–0.5)
INR PPP: 1.8 (ref 0.8–1.2)
LYMPHOCYTES # BLD AUTO: 1 K/UL (ref 1–4.8)
LYMPHOCYTES NFR BLD: 13 % (ref 18–48)
MAGNESIUM SERPL-MCNC: 2.2 MG/DL (ref 1.6–2.6)
MCH RBC QN AUTO: 23.5 PG (ref 27–31)
MCHC RBC AUTO-ENTMCNC: 29 G/DL (ref 32–36)
MCV RBC AUTO: 81 FL (ref 82–98)
MONOCYTES # BLD AUTO: 1.5 K/UL (ref 0.3–1)
MONOCYTES NFR BLD: 18.9 % (ref 4–15)
NEUTROPHILS # BLD AUTO: 4.5 K/UL (ref 1.8–7.7)
NEUTROPHILS NFR BLD: 57.5 % (ref 38–73)
NRBC BLD-RTO: 0 /100 WBC
PLATELET # BLD AUTO: 261 K/UL (ref 150–350)
PMV BLD AUTO: 9.9 FL (ref 9.2–12.9)
POTASSIUM SERPL-SCNC: 3.8 MMOL/L (ref 3.5–5.1)
PROTHROMBIN TIME: 18 SEC (ref 9–12.5)
RBC # BLD AUTO: 3.15 M/UL (ref 4.6–6.2)
SODIUM SERPL-SCNC: 143 MMOL/L (ref 136–145)
WBC # BLD AUTO: 7.76 K/UL (ref 3.9–12.7)

## 2019-12-06 PROCEDURE — 80048 BASIC METABOLIC PNL TOTAL CA: CPT

## 2019-12-06 PROCEDURE — 25000003 PHARM REV CODE 250: Performed by: HOSPITALIST

## 2019-12-06 PROCEDURE — 93010 EKG 12-LEAD: ICD-10-PCS | Mod: ,,, | Performed by: INTERNAL MEDICINE

## 2019-12-06 PROCEDURE — 63600175 PHARM REV CODE 636 W HCPCS: Performed by: HOSPITALIST

## 2019-12-06 PROCEDURE — 85025 COMPLETE CBC W/AUTO DIFF WBC: CPT

## 2019-12-06 PROCEDURE — 93005 ELECTROCARDIOGRAM TRACING: CPT

## 2019-12-06 PROCEDURE — 36415 COLL VENOUS BLD VENIPUNCTURE: CPT

## 2019-12-06 PROCEDURE — 99233 SBSQ HOSP IP/OBS HIGH 50: CPT | Mod: ,,, | Performed by: HOSPITALIST

## 2019-12-06 PROCEDURE — 93010 ELECTROCARDIOGRAM REPORT: CPT | Mod: ,,, | Performed by: INTERNAL MEDICINE

## 2019-12-06 PROCEDURE — 99233 PR SUBSEQUENT HOSPITAL CARE,LEVL III: ICD-10-PCS | Mod: ,,, | Performed by: HOSPITALIST

## 2019-12-06 PROCEDURE — 85610 PROTHROMBIN TIME: CPT

## 2019-12-06 PROCEDURE — 83735 ASSAY OF MAGNESIUM: CPT

## 2019-12-06 PROCEDURE — 97530 THERAPEUTIC ACTIVITIES: CPT

## 2019-12-06 PROCEDURE — 20600001 HC STEP DOWN PRIVATE ROOM

## 2019-12-06 RX ORDER — POTASSIUM CHLORIDE 20 MEQ/1
40 TABLET, EXTENDED RELEASE ORAL ONCE
Status: DISCONTINUED | OUTPATIENT
Start: 2019-12-06 | End: 2019-12-06

## 2019-12-06 RX ORDER — POTASSIUM CHLORIDE 20 MEQ/15ML
40 SOLUTION ORAL ONCE
Status: COMPLETED | OUTPATIENT
Start: 2019-12-06 | End: 2019-12-06

## 2019-12-06 RX ORDER — POTASSIUM CHLORIDE 20 MEQ/15ML
20 SOLUTION ORAL ONCE
Status: COMPLETED | OUTPATIENT
Start: 2019-12-06 | End: 2019-12-06

## 2019-12-06 RX ORDER — ISOSORBIDE DINITRATE 10 MG/1
20 TABLET ORAL 3 TIMES DAILY
Status: DISCONTINUED | OUTPATIENT
Start: 2019-12-06 | End: 2019-12-08 | Stop reason: HOSPADM

## 2019-12-06 RX ORDER — WARFARIN 4 MG/1
8 TABLET ORAL DAILY
Status: ON HOLD | COMMUNITY
End: 2019-12-08 | Stop reason: HOSPADM

## 2019-12-06 RX ORDER — NITROGLYCERIN 0.4 MG/1
0.4 TABLET SUBLINGUAL EVERY 5 MIN PRN
Status: DISCONTINUED | OUTPATIENT
Start: 2019-12-06 | End: 2019-12-08 | Stop reason: HOSPADM

## 2019-12-06 RX ORDER — HYDRALAZINE HYDROCHLORIDE 25 MG/1
25 TABLET, FILM COATED ORAL 3 TIMES DAILY
Status: DISCONTINUED | OUTPATIENT
Start: 2019-12-06 | End: 2019-12-08 | Stop reason: HOSPADM

## 2019-12-06 RX ORDER — FUROSEMIDE 80 MG/1
80 TABLET ORAL 2 TIMES DAILY
Status: DISCONTINUED | OUTPATIENT
Start: 2019-12-06 | End: 2019-12-08 | Stop reason: HOSPADM

## 2019-12-06 RX ADMIN — POTASSIUM CHLORIDE 20 MEQ: 20 SOLUTION ORAL at 05:12

## 2019-12-06 RX ADMIN — HYDRALAZINE HYDROCHLORIDE AND ISOSORBIDE DINITRATE 1 TABLET: 37.5; 2 TABLET, FILM COATED ORAL at 03:12

## 2019-12-06 RX ADMIN — WARFARIN SODIUM 5 MG: 5 TABLET ORAL at 04:12

## 2019-12-06 RX ADMIN — GABAPENTIN 100 MG: 250 SOLUTION ORAL at 10:12

## 2019-12-06 RX ADMIN — HYDRALAZINE HYDROCHLORIDE AND ISOSORBIDE DINITRATE 1 TABLET: 37.5; 2 TABLET, FILM COATED ORAL at 10:12

## 2019-12-06 RX ADMIN — DOBUTAMINE HYDROCHLORIDE 2.5 MCG/KG/MIN: 200 INJECTION INTRAVENOUS at 05:12

## 2019-12-06 RX ADMIN — POTASSIUM CHLORIDE 40 MEQ: 20 SOLUTION ORAL at 04:12

## 2019-12-06 RX ADMIN — PANTOPRAZOLE SODIUM 40 MG: 40 TABLET, DELAYED RELEASE ORAL at 05:12

## 2019-12-06 RX ADMIN — FUROSEMIDE 80 MG: 80 TABLET ORAL at 05:12

## 2019-12-06 RX ADMIN — ISOSORBIDE DINITRATE 20 MG: 10 TABLET ORAL at 08:12

## 2019-12-06 RX ADMIN — ASPIRIN 81 MG: 81 TABLET, COATED ORAL at 10:12

## 2019-12-06 RX ADMIN — HYDRALAZINE HYDROCHLORIDE 25 MG: 25 TABLET, FILM COATED ORAL at 08:12

## 2019-12-06 RX ADMIN — TRAMADOL HYDROCHLORIDE 50 MG: 50 TABLET, FILM COATED ORAL at 11:12

## 2019-12-06 RX ADMIN — FERROUS SULFATE TAB EC 325 MG (65 MG FE EQUIVALENT) 325 MG: 325 (65 FE) TABLET DELAYED RESPONSE at 10:12

## 2019-12-06 NOTE — PLAN OF CARE
Reviewed plan of care with patient.  Patient is alert, oriented x 4, independent, ambulatory w/standby assist, and runs Afib on the monitor.  Patient had 22 beats of VTACH at 0712 hours.  Patient is a fall risk,Telesitter activated.  Patient does not feel he is a fall risk, expresses anger, and routinely tells staff that he will do what he wants when he wants, and that he cannot wait until he goes home.  Patient refused labs, morning medication, and passive-aggressively threatens staff.  DOBUTamine gtt infusing @ 2.5 mcq/kg/min.  Lasix 120 mg IVP BID.  Diuril 100 mg/50 ml IVPB q12h.  2-3+ Bilateral lower extremity edema noted.  Strict I&O's. Fluid Restriction:  1500 ml/day.  Daily weights.  Bedside commode in room, but patient does not use it.  Patient has remained free of falls/trauma/injury.  Patient verbalized understanding

## 2019-12-06 NOTE — NURSING
"Attempted to assess chest pain.  Patient is on his cell phone.  Bedside asked patient, "Are you still having any chest pain."  Patient ignored bedside, will follow up.  Patient did allow bedside to assess vitals.  "

## 2019-12-06 NOTE — PLAN OF CARE
[facility] was current with [facility] prior to current admission and when Pt is ready to discharge [facility]. Patient will also need medicaid transport home at discharge.       Mary Ellen Valencia, LMSW [facility]  Ext. [phone]

## 2019-12-06 NOTE — NURSING
"Pt had a 29 beat run of VTACH. Pt asymptomatic. In chair sleeping. Pt also refused to allow the phlebotomist to draw his labs when she came in while I was speaking to the patient. He stated "you ain't getting no blood right now." MD has been paged.  "

## 2019-12-06 NOTE — NURSING
Notified Dr. Ferrara that pt had another 29 beat run of VT. No further orders. Continue to notify MD for any VT over 15 beats. INDIA

## 2019-12-06 NOTE — NURSING
Notified MD of pt refusal to allow safety interventions. Also notified of Run of Vtach. Pt asymptomatic. No orders at this time. INDIA

## 2019-12-06 NOTE — PLAN OF CARE
OT goals reviewed and remain appropriate.   CHRISTINA Cuello/L  Occupational Therapy  Pager #: [phone]  12/6/2019    Problem: Occupational Therapy Goal  Goal: Occupational Therapy Goal  Description  Goals to be met by 12/12/19:    Patient will increase functional independence with ADLs by performing:    UE Dressing with Set-up Assistance.  LE Dressing with Stand-by Assistance.  Grooming while standing with Stand-by Assistance.  Toileting from toilet with Stand-by Assistance for hygiene and clothing management.   Toilet transfer to toilet with Stand-by Assistance.     Outcome: Ongoing, Progressing

## 2019-12-06 NOTE — NURSING
"Patient observed sitting in his bed with blanket over his head.  Patient has been having runs of VTACH.  Bedside attempted to wake up patient, and patient did not respond x 3.  Bedside lifted blanket off of patient's head to assess status.  Patient stated, "The next person who wakes me up I don't know what I am going to do."  Patient stated, "Shut the damn door you are letting all the heat out."  Patient refused morning medications and labs.  KAREEM Howard M.D. notified.  "

## 2019-12-06 NOTE — NURSING
"Upon entering room for vital signs, Mr Terrell was in chair, leaning forward, eyes closed, resp even and unlabored. I spoke his name twice, and he looked up. I asked him if he was tired and he stated "Ever since they gave me blood, I been going in and out of conciousness. I'm not tired."  I took vitals, they were WNL. Pt calm and cooperative.  I asked the patient to please get in bed for safety so he doesn't fall out of the chair. He REFUSED.  I turned the telesitter camera so it could observe him better, and he became agitated, stating " TURN THAT BACK AROUND, I DON'T WANT THAT ON ME!"  I explained to him that I am trying to keep him safe, to which he stated "I heard everything you said, I am safe, I don't need that."  MITA Montenegro notified of pt noncompliance with safety precautions.   WCTM  "

## 2019-12-06 NOTE — NURSING
Patient reportedly passed black stool.  KAREEM Howard M.D. notified.  Patient receives ferrous sulfate 325 mg oral daily, and that may cause some of the dark color per team.

## 2019-12-06 NOTE — PT/OT/SLP PROGRESS
Occupational Therapy   Treatment    Name: Hamlet Terrell  MRN: [id number]  Admitting Diagnosis:  Acute on chronic combined systolic and diastolic heart failure  4 Days Post-Op    Recommendations:     Discharge Recommendations: nursing facility, skilled  Discharge Equipment Recommendations:  none  Barriers to discharge:  Decreased caregiver support    Assessment:     Hamlet Terrell is a 54 y.o. male with a medical diagnosis of Acute on chronic combined systolic and diastolic heart failure. He presents with the following performance deficits affecting function: impaired endurance, impaired self care skills, impaired functional mobilty, impaired cardiopulmonary response to activity. Patient agreed to participate in therapy on 2nd attempt. Patient's performance in ADLs continues to be limited by B LE edema. Patient able to ambulate ~150 feet with SBA, requiring 1 standing rest period secondary to SOB/fatigue. At this time, patient will continue to benefit from acute skilled therapy intervention to address deficits/underlying impairments and progress towards prior level of function. After discharge, patient would benefit from continued skilled therapy intervention at SNF to progress more towards independence in ADLs and functional mobility before going home due to limited social support at home.    Rehab Prognosis:  Good; patient would benefit from acute skilled OT services to address these deficits and reach maximum level of function.       Plan:     Patient to be seen 3 x/week to address the above listed problems via self-care/home management, therapeutic activities, therapeutic exercises  · Plan of Care Expires: 12/27/19  · Plan of Care Reviewed with: patient    Subjective     Pain/Comfort:  · Pain Rating 1: (Patient did not rate pain.)    Objective:     Communicated with: RN prior to session. Patient found up in chair with telemetry, peripheral IV upon OT entry to room.    General Precautions: Standard, fall    Orthopedic Precautions:N/A   Braces: N/A     Occupational Performance:     Bed Mobility:    · Not assessed; patient up in chair at start of session and returned to chair at end of session    Functional Mobility/Transfers:  · Patient completed Sit <> Stand Transfer with supervision with no assistive device   · Functional Mobility: Patient ambulated in hallway ~150 feet with IV pole for support with SBA    Activities of Daily Living:  · Lower Body Dressing: maximal assistance to manage socks   · Patient declined performing additional ADLs this session    [facility] 6 Click ADL: 18    Treatment & Education:  --Therapist provided facilitation and instruction of proper body mechanics, energy conservation, and fall prevention strategies during tasks listed above.  --Patient educated on importance on OOB activities.  --Instructed patient to sit in bedside chair daily to increase OOB/activity tolerance.   --Educated patient on OT POC and answered all questions within OT scope of practice.    Patient left up in chair with all lines intact and call button in reachEducation:      GOALS:   Multidisciplinary Problems     Occupational Therapy Goals        Problem: Occupational Therapy Goal    Goal Priority Disciplines Outcome Interventions   Occupational Therapy Goal     OT, PT/OT Ongoing, Progressing    Description:  Goals to be met by 12/12/19:    Patient will increase functional independence with ADLs by performing:    UE Dressing with Set-up Assistance.  LE Dressing with Stand-by Assistance.  Grooming while standing with Stand-by Assistance.  Toileting from toilet with Stand-by Assistance for hygiene and clothing management.   Toilet transfer to toilet with Stand-by Assistance.                      Time Tracking:     OT Date of Treatment: 12/06/19  OT Start Time: 1218  OT Stop Time: 1232  OT Total Time (min): 14 min    Billable Minutes:Therapeutic Activity 14    Pat Christianson OT  12/6/2019

## 2019-12-06 NOTE — PROGRESS NOTES
[facility] Hospital Medicine  Progress Note    Primary Team: [facility] MED C  Admit Date: 11/24/2019    Subjective:      HPI:   Mr. Terrell is a 54M HFrEF (EF 23%, moderate MR, moderate TR, diastolic dysfunction; multiple admissions in 2019, no ICD), with pulm HTN (PASP 47 mm Hg), Permanent AF/AFL s/p CTI with CVA in 2009 on Coumadin, CAD and obstructive sleep apnea who presented 11/24/19 with 1 week history of worsening SOB / MUÑOZ and 30 lb weight gain. Patient states compliance with fluid, salt and medication, However since last week having worsening LE edema with fluid weeping of his legs. Associated with MUÑOZ, Fatigue. He does have chronic severe anemia (refusing transfusion) with Hbg 6, additionally CXR demonstrating worsening RLL consolidation vs. Effusion. Case discussed with cardiology in ED, given Lasix IVP with diuril with minimal UOP, today patient having AMS / lethargy, worsening renal function with elevated BNP, Tbili trending up, with soft BP SBP 90s, requiring supplemental oxygen. Cardiology consulted for Inotropic support, on telemetry patient having NSVT with permanent Afib.He also has severe anemia with a hemoglobin of 6.2 which is consistent with his hemoglobin obtained on last admit on 10/23/2019.  The patient initially refuses blood products due to risk of transmission of infection.  Chest x-ray was done which showed increase lobulated oval opacity in the right mid lung field which is a new finding compared to chest x-ray done on 10/23/2019.  BNP was done and was significant for a value of 210.  The patient was initially given a mg of Lasix IV with minimal response and this was followed by 120 mg IV per Cardiology.    ECHO 11/17/2019  · Severely decreased left ventricular systolic function. The estimated ejection fraction is 25%  · Concentric left ventricular hypertrophy.  · Global hypokinetic wall motion.  · Moderate right ventricular enlargement.  · Moderately reduced right  ventricular systolic function.  · Severe biatrial enlargement.  · Moderate mitral regurgitation.  · Moderate tricuspid regurgitation.  · The estimated PA systolic pressure is 40 mm Hg  · Elevated central venous pressure (15 mm Hg).  · Atrial fibrillation observed.    Hospital course:  Admitted  for ADHF on IV Lasix. Weight increasing with UOP minimal and Cr worsening. Patient seen by nephrology and cardiology for assistance. Persistent symptoms as above, with additional lethargy without change in mental status. Patient was started on IV  on 11/26 as well as lasix gtt. Diuril augmentation added. Patient with persistent anemia despite blood transfusions. GI was consulted with concern for occult bleeding. Patient denies active bleeding and refuses rectal examination.  The patient's counts remain above 7 after 3U pRBC. Patient planned for endoscopic evaluation, with EGD and colonoscopy without evidence of active bleeding (the latter limited by suboptimal prep). The patient has had sustained runs of VT so cardiology was consulted but feel this is actually more c/w AF with RVR and thus  is continued.  Patient with stagnant UOP so lasix changed to IVP.     Interval history:   Patient with improving UOP on lasix IVP. He has no SOB. Patient discussed extensively with nephrology - stop  and transition to PO diuretics. I discussed this extensively with patient.  He reports dark stool but this may be due to iron supplementation. Await today's labs.Patient remains distracted by components of hospital stay (i.e. Lack of sleep, not getting what he wants from ancillary staff) and is difficult to re-direct to discuss his medical care and prognosis.  All questions answered to patient satisfaction.    ROS:  As per HPI  General: no fever, no chills, no weight loss, no fatigue  Cardiovascular: no chest pain  Respiratory: no cough, no wheezes  All other systems reviewed & are negative.     Past Medical History:   Diagnosis Date     Anticoagulant long-term use     Cardiomegaly     Chronic combined systolic and diastolic congestive heart failure     Coronary artery disease     Heart attack 2006    Hypertension     Hyperthyroidism, subclinical 1/2/2013    MI (myocardial infarction) 9/22/2013    MI in 2009      Paroxysmal atrial fibrillation     PE (pulmonary embolism) 1/1/2013    IN 2010     S/P ablation of atrial flutter 2008    Stroke 2009    no residual weaknesses     Past Surgical History:   Procedure Laterality Date    COLONOSCOPY N/A 12/2/2019    Procedure: COLONOSCOPY;  Surgeon: Scott Rosario MD;  Location: [facility] ([address] [place] [address]);  Service: Endoscopy;  Laterality: N/A;    ESOPHAGOGASTRODUODENOSCOPY N/A 12/2/2019    Procedure: EGD (ESOPHAGOGASTRODUODENOSCOPY);  Surgeon: Scott Rosario MD;  Location: [facility] ([address] [place] [address]);  Service: Endoscopy;  Laterality: N/A;    RADIOFREQUENCY ABLATION  01/08/2008    for atrial flutter     Social History     Socioeconomic History    Marital status: Single     Spouse name: Not on file    Number of children: Not on file    Years of education: Not on file    Highest education level: Not on file   Occupational History    Not on file   Social Needs    Financial resource strain: Not on file    Food insecurity:     Worry: Not on file     Inability: Not on file    Transportation needs:     Medical: Not on file     Non-medical: Not on file   Tobacco Use    Smoking status: Never Smoker    Smokeless tobacco: Never Used   Substance and Sexual Activity    Alcohol use: No    Drug use: No    Sexual activity: Never   Lifestyle    Physical activity:     Days per week: Not on file     Minutes per session: Not on file    Stress: Not on file   Relationships    Social connections:     Talks on phone: Not on file     Gets together: Not on file     Attends [employer] service: Not on file     Active member of club or organization: Not on file     Attends meetings of clubs or organizations: Not  on file     Relationship status: Not on file   Other Topics Concern    Not on file   Social History Narrative    Not on file         Objective:   Last 24 Hour Vital Signs:  BP  Min: 113/58  Max: 144/76  Temp  Av.8 °F (36.6 °C)  Min: 97.5 °F (36.4 °C)  Max: 98.4 °F (36.9 °C)  Pulse  Av  Min: 86  Max: 128  Resp  Av  Min: 16  Max: 16  SpO2  Av.6 %  Min: 96 %  Max: 98 %  Weight  Av.5 kg (261 lb 3.9 oz)  Min: 118.5 kg (261 lb 3.9 oz)  Max: 118.5 kg (261 lb 3.9 oz)  I/O last 3 completed shifts:  In:  [P.O.:]  Out: 5225 [Urine:5225]    Physical Examination:  GEN: AAOx3, NAD, appears mildly short of breath  HEENT: NCAT, MMM, PERRL, EOMI, oropharynx clear +JVD with HJS  CV: RRR, no m/r, no S3/S4, warm, well perfused  RESP: CTAB, no wheezes/crackles, no increased WOB  ABD: soft, NTND, normoactive BS, no organomegaly  EXTR: no c/c, intact distal pulses x 4, BLE edema  NEURO: PERRL, EOMI, moving all four extremities, intact sensation to light touch, no focal deficits  SKIN: no rashes, lesions, or color changes  PSYCH: normal affect    Laboratory:  I have reviewed all pertinent lab results/findings within the past 24 hours.    Radiology:  I have reviewed all pertinent imaging results/findings within the past 24 hours.    Current Medications:     Infusions:   DOBUTamine 2.5 mcg/kg/min (19)        Scheduled:   aspirin  81 mg Oral Daily    chlorothiazide (DIURIL) IVPB  1,000 mg Intravenous Q12H    ferrous sulfate  325 mg Oral Daily    fluticasone propionate  2 spray Each Nostril Daily    furosemide  120 mg Intravenous BID    gabapentin  100 mg Oral Q12H    isosorbide-hydrALAZINE 20-37.5 mg  1 tablet Oral TID    magnesium sulfate IVPB  2 g Intravenous Once    mirtazapine  7.5 mg Oral QHS    pantoprazole  40 mg Oral Before breakfast    senna-docusate 8.6-50 mg  1 tablet Oral BID    warfarin  5 mg Oral Daily        PRN:  sodium chloride, sodium chloride, calcium carbonate,  diphenhydrAMINE, melatonin, methyl salicylate-menthol 15-10%, ondansetron, promethazine (PHENERGAN) IVPB, sodium chloride 0.9%, traMADol    Prior records reviewed.    Assessment/Plan:     Hamlet Terrell is a 54 y.o.male with    Acute on chronic combined systolic and diastolic heart failure  Initial poor response to 80 mg of Lasix and patient given Lasix 120 mg IV and Diuril 500 mg IV on admission per Cardiology.  EF 23%.  Stop   Stop diuril  Changed lasix to PO  Consider metolazone augmentation  Continue Bidil as tolerated  Hold lopressor for now, consider reinitiation    Abnormal CT  Abnormal opacity in middle lobe of right lung  Non-smoker  Obtain CT of the chest once more euvolemic (could not lay flat)     JEAN-PIERRE on CKD3  Cr 1.9 > 2.3 > 3.2 > 3.7 > 3.2 >2.6>2.3 (baseline 1.6-1.7)  Suspect cardiorenal  Monitor on diuresis  Continue to monitor electrolytes   Nephrology consulted, appreciate recommendations    Essential hypertension  Adjust antiHTNsives as above     Atrial fibrillation, chronic  Chronic anticoagulation  INR 2.3  Okay to c/w coumadin  Daily INR's  HR controlled. Hold metoprolol with  on board     Iron deficiency anemia  Patient with LANDON, initially refused blood tranfusions  Patient now agrees to blood transfusion - transfused 4U pRBC  C/w daily iron  GI consult appreciated; endoscopic evaluation - no active bleeding noted     History of CVA (cerebrovascular accident)  No acute management     Chronic respiratory failure  1.5 L NC at home  Reports malfunctioning O2 tank at home - consult CM     DVT PPx: coumadin  Diet: cardiac 1200 cc  FULL CODE    Markus Howard MD  Hospital Medicine Staff  [facility]

## 2019-12-06 NOTE — PLAN OF CARE
"Plan of care reviewed with patient. Pt refuses to follow safety precautions or fall precautions. Pt is noncompliant with several medications, and refuses to allow education. He states "I know what I need to know. You ain"t telling me nothing." Pt is only taking medications he chooses, and refuses others. Pt also refuses another IV access at this time. Dobutamine is infusing per orders. Pt is currently in chair and denies further needs at this time. When encouraged to use call bell if he needs anything at all he stated "That ain't gonna happen." WCTM  "

## 2019-12-06 NOTE — NURSING
Notified Dr. Ferrara that pt had small run of VTACH once again. [place]. MD stated, notify MD when runs are>15 beats.

## 2019-12-06 NOTE — NURSING
"Patient observed eating breakfast.  Bedside asked patient if he would like her to come back after he eats, patient stated, "yes."    "

## 2019-12-06 NOTE — NURSING
Lasix 120 mg IVP BID not given, no PIV access.  Star, Charge RN, tried x 2.  PICC Consult requested.

## 2019-12-06 NOTE — NURSING
Results for DIANN BARRERA (MRN [id number]) as of 12/5/2019 18:29   Ref. Range 12/3/2019 14:08 12/3/2019 16:59 12/4/2019 11:03 12/4/2019 11:03 12/5/2019 13:28   Potassium Latest Ref Range: 3.5 - 5.1 mmol/L 3.6  3.9 3.8 3.8   Secure shantel Howard M.D.

## 2019-12-06 NOTE — NURSING
"Pt IV in hand is leaking, unable to begin chlorothiazide IV medication. Pt refuse to allow another access to be started. I educated the pt on the importance, refusal became more adament. He stated "you will maybe do it in the morning. It ain't happening tonight. FUCK."  WCTM  "

## 2019-12-06 NOTE — NURSING
"Pt up from chair, telesitter alarm sounded. Pt became agitated, demands to be left alone. He states "turn that off, get that out of here!!! I ain't listening to that when I pee! Im standing up when I need to pee!"   Pt currently under blanket in chair. Does not answer when spoken to. The blanket is moving with respirations, even and unlabored. WCTM  "

## 2019-12-07 LAB
ANION GAP SERPL CALC-SCNC: 10 MMOL/L (ref 8–16)
ANION GAP SERPL CALC-SCNC: 16 MMOL/L (ref 8–16)
ANISOCYTOSIS BLD QL SMEAR: SLIGHT
BASO STIPL BLD QL SMEAR: ABNORMAL
BASOPHILS # BLD AUTO: 0.07 K/UL (ref 0–0.2)
BASOPHILS NFR BLD: 1 % (ref 0–1.9)
BUN SERPL-MCNC: 83 MG/DL (ref 6–20)
BUN SERPL-MCNC: 83 MG/DL (ref 6–20)
CALCIUM SERPL-MCNC: 10.2 MG/DL (ref 8.7–10.5)
CALCIUM SERPL-MCNC: 9.7 MG/DL (ref 8.7–10.5)
CHLORIDE SERPL-SCNC: 95 MMOL/L (ref 95–110)
CHLORIDE SERPL-SCNC: 97 MMOL/L (ref 95–110)
CO2 SERPL-SCNC: 33 MMOL/L (ref 23–29)
CO2 SERPL-SCNC: 36 MMOL/L (ref 23–29)
CREAT SERPL-MCNC: 2.2 MG/DL (ref 0.5–1.4)
CREAT SERPL-MCNC: 2.2 MG/DL (ref 0.5–1.4)
DIFFERENTIAL METHOD: ABNORMAL
EOSINOPHIL # BLD AUTO: 0.8 K/UL (ref 0–0.5)
EOSINOPHIL NFR BLD: 10.7 % (ref 0–8)
ERYTHROCYTE [DISTWIDTH] IN BLOOD BY AUTOMATED COUNT: 25.9 % (ref 11.5–14.5)
EST. GFR  (AFRICAN AMERICAN): 38.1 ML/MIN/1.73 M^2
EST. GFR  (AFRICAN AMERICAN): 38.1 ML/MIN/1.73 M^2
EST. GFR  (NON AFRICAN AMERICAN): 33 ML/MIN/1.73 M^2
EST. GFR  (NON AFRICAN AMERICAN): 33 ML/MIN/1.73 M^2
GIANT PLATELETS BLD QL SMEAR: PRESENT
GLUCOSE SERPL-MCNC: 143 MG/DL (ref 70–110)
GLUCOSE SERPL-MCNC: 99 MG/DL (ref 70–110)
HCT VFR BLD AUTO: 25.7 % (ref 40–54)
HGB BLD-MCNC: 7.4 G/DL (ref 14–18)
HOWELL-JOLLY BOD BLD QL SMEAR: ABNORMAL
HYPOCHROMIA BLD QL SMEAR: ABNORMAL
IMM GRANULOCYTES # BLD AUTO: 0.04 K/UL (ref 0–0.04)
IMM GRANULOCYTES NFR BLD AUTO: 0.6 % (ref 0–0.5)
INR PPP: 1.7 (ref 0.8–1.2)
LYMPHOCYTES # BLD AUTO: 1 K/UL (ref 1–4.8)
LYMPHOCYTES NFR BLD: 13.7 % (ref 18–48)
MAGNESIUM SERPL-MCNC: 2.1 MG/DL (ref 1.6–2.6)
MAGNESIUM SERPL-MCNC: 2.1 MG/DL (ref 1.6–2.6)
MCH RBC QN AUTO: 23.6 PG (ref 27–31)
MCHC RBC AUTO-ENTMCNC: 28.8 G/DL (ref 32–36)
MCV RBC AUTO: 82 FL (ref 82–98)
MONOCYTES # BLD AUTO: 1.6 K/UL (ref 0.3–1)
MONOCYTES NFR BLD: 22 % (ref 4–15)
NEUTROPHILS # BLD AUTO: 3.8 K/UL (ref 1.8–7.7)
NEUTROPHILS NFR BLD: 52 % (ref 38–73)
NRBC BLD-RTO: 0 /100 WBC
OVALOCYTES BLD QL SMEAR: ABNORMAL
PAPPENHEIMER BOD BLD QL SMEAR: PRESENT
PLATELET # BLD AUTO: 268 K/UL (ref 150–350)
PLATELET BLD QL SMEAR: ABNORMAL
PMV BLD AUTO: 10.2 FL (ref 9.2–12.9)
POIKILOCYTOSIS BLD QL SMEAR: SLIGHT
POLYCHROMASIA BLD QL SMEAR: ABNORMAL
POTASSIUM SERPL-SCNC: 3.6 MMOL/L (ref 3.5–5.1)
POTASSIUM SERPL-SCNC: 4.2 MMOL/L (ref 3.5–5.1)
PROTHROMBIN TIME: 16.9 SEC (ref 9–12.5)
RBC # BLD AUTO: 3.14 M/UL (ref 4.6–6.2)
SODIUM SERPL-SCNC: 143 MMOL/L (ref 136–145)
SODIUM SERPL-SCNC: 144 MMOL/L (ref 136–145)
TARGETS BLD QL SMEAR: ABNORMAL
TOXIC GRANULES BLD QL SMEAR: PRESENT
WBC # BLD AUTO: 7.23 K/UL (ref 3.9–12.7)

## 2019-12-07 PROCEDURE — 80048 BASIC METABOLIC PNL TOTAL CA: CPT

## 2019-12-07 PROCEDURE — 25000003 PHARM REV CODE 250: Performed by: HOSPITALIST

## 2019-12-07 PROCEDURE — 99233 SBSQ HOSP IP/OBS HIGH 50: CPT | Mod: ,,, | Performed by: HOSPITALIST

## 2019-12-07 PROCEDURE — 94761 N-INVAS EAR/PLS OXIMETRY MLT: CPT

## 2019-12-07 PROCEDURE — 83735 ASSAY OF MAGNESIUM: CPT | Mod: 91

## 2019-12-07 PROCEDURE — 20600001 HC STEP DOWN PRIVATE ROOM

## 2019-12-07 PROCEDURE — 83735 ASSAY OF MAGNESIUM: CPT

## 2019-12-07 PROCEDURE — 80048 BASIC METABOLIC PNL TOTAL CA: CPT | Mod: 91

## 2019-12-07 PROCEDURE — 85610 PROTHROMBIN TIME: CPT

## 2019-12-07 PROCEDURE — 85025 COMPLETE CBC W/AUTO DIFF WBC: CPT

## 2019-12-07 PROCEDURE — 36415 COLL VENOUS BLD VENIPUNCTURE: CPT

## 2019-12-07 PROCEDURE — 99233 PR SUBSEQUENT HOSPITAL CARE,LEVL III: ICD-10-PCS | Mod: ,,, | Performed by: HOSPITALIST

## 2019-12-07 RX ORDER — POTASSIUM CHLORIDE 20 MEQ/15ML
40 SOLUTION ORAL ONCE
Status: COMPLETED | OUTPATIENT
Start: 2019-12-07 | End: 2019-12-07

## 2019-12-07 RX ADMIN — ISOSORBIDE DINITRATE 20 MG: 10 TABLET ORAL at 08:12

## 2019-12-07 RX ADMIN — FUROSEMIDE 80 MG: 80 TABLET ORAL at 08:12

## 2019-12-07 RX ADMIN — POTASSIUM CHLORIDE 40 MEQ: 20 SOLUTION ORAL at 01:12

## 2019-12-07 RX ADMIN — ISOSORBIDE DINITRATE 20 MG: 10 TABLET ORAL at 03:12

## 2019-12-07 RX ADMIN — ASPIRIN 81 MG: 81 TABLET, COATED ORAL at 08:12

## 2019-12-07 RX ADMIN — FUROSEMIDE 80 MG: 80 TABLET ORAL at 06:12

## 2019-12-07 RX ADMIN — HYDRALAZINE HYDROCHLORIDE 25 MG: 25 TABLET, FILM COATED ORAL at 03:12

## 2019-12-07 RX ADMIN — HYDRALAZINE HYDROCHLORIDE 25 MG: 25 TABLET, FILM COATED ORAL at 08:12

## 2019-12-07 RX ADMIN — FERROUS SULFATE TAB EC 325 MG (65 MG FE EQUIVALENT) 325 MG: 325 (65 FE) TABLET DELAYED RESPONSE at 08:12

## 2019-12-07 RX ADMIN — WARFARIN SODIUM 7 MG: 5 TABLET ORAL at 06:12

## 2019-12-07 RX ADMIN — MIRTAZAPINE 7.5 MG: 7.5 TABLET ORAL at 08:12

## 2019-12-07 NOTE — PLAN OF CARE
Reviewed plan of care with patient.  Patient is alert, oriented x 4, independent, ambulatory, and runs Afib on the monitor.  Plan is to discharge patient on Monday.  Patient had 20 beats of VTACH at 0700 hours, has had episodes of VTACH throughout the morning.  Patient is a fall risk,Telesitter removed from his room by his request.  Lasix 80 mg oral BID to diurese.  Bilateral lower extremity edema noted.  Strict I&O's. Fluid Restriction:  1500 ml/day.  Daily weights.  Removed bedside commode from room.  CAMI Hose on.  Patient has remained free of falls/trauma/injury.  Patient verbalized understanding

## 2019-12-07 NOTE — PLAN OF CARE
Plan of care reviewed with patient. Pt does not agree that fall precautions should be in place. Pt is compliant with only the medications he chooses. Pt refuses to sleep in the bed, and sleeps with a blanket over his face and body. He complains that we only give him tramadol for pain. Pt allows some Vitals, and continuously refuses to allow staff to do ordered procedures, such as labs, until such time as he sees fit to allow it. Pt is very resistant to any education. WCTM.

## 2019-12-07 NOTE — NURSING
"Pt demanded that I remove the telesitter camera from his room. Pt stated, "you people are surveilling me and I don't do nothing but sit in my chair. How would you like it if I did it to you? You act like you are the FBI or something! Listening to my conversations"  Pt resistant to any safety teaching. Camera removed from room MD notified, Dr. Lenz will discontinue order due to pt refusal. WCTM   "

## 2019-12-07 NOTE — NURSING
Attempted to notify Dr Lenz of several runs of V-Tach. 5 beat, 8 beat, and 9 beat.Waiting on return call.

## 2019-12-07 NOTE — NURSING
Administered 1800 hour medications.  Patient did not complain of chest pain.  Patient did not want bedside to continue frequent vital signs.

## 2019-12-07 NOTE — NURSING
Patient sleeps in chair with blanket covering his head.  Patient requests that his medications be placed on edge of bed, to include 1 container of apple juice provided with all oral medications.  Patient takes medications at his own pace and will not take them while bedside is in the room.  Provided 20 mEq of oral potassium, lasix 80 mg oral tablet and apple juice to patient.  Patient asked bedside to turn off the lights when leaving the room.  Patient did not consume medication while bedside was in the room.

## 2019-12-07 NOTE — NURSING
"Telesitter removed by night team.  Patient stated, "I didn't want them listening to my conversations anymore.  Night nurse took it out and didn't even care about the people on the other end.  She just unplugged it and took it out."  Educated patient the device was placed in his room for his safety and to prevent falls.  "

## 2019-12-07 NOTE — NURSING
Spoke to Dr. Lenz. Voiced understanding of runs of V-Tach. Will notify MD in future of any beats over 20 beats. WCTM

## 2019-12-07 NOTE — NURSING
"Bedside requested to flush PIVs x 2.  Patient stated, "They are good."  PIVs not flushed for day shift.  "

## 2019-12-07 NOTE — PROGRESS NOTES
[facility] Hospital Medicine  Progress Note    Primary Team: [facility] HOSP MED C  Admit Date: 11/24/2019    Subjective:      HPI:   Mr. Terrell is a 54M HFrEF (EF 23%, moderate MR, moderate TR, diastolic dysfunction; multiple admissions in 2019, no ICD), with pulm HTN (PASP 47 mm Hg), Permanent AF/AFL s/p CTI with CVA in 2009 on Coumadin, CAD and obstructive sleep apnea who presented 11/24/19 with 1 week history of worsening SOB / MUÑOZ and 30 lb weight gain. Patient states compliance with fluid, salt and medication, However since last week having worsening LE edema with fluid weeping of his legs. Associated with MUÑOZ, Fatigue. He does have chronic severe anemia (refusing transfusion) with Hbg 6, additionally CXR demonstrating worsening RLL consolidation vs. Effusion. Case discussed with cardiology in ED, given Lasix IVP with diuril with minimal UOP, today patient having AMS / lethargy, worsening renal function with elevated BNP, Tbili trending up, with soft BP SBP 90s, requiring supplemental oxygen. Cardiology consulted for Inotropic support, on telemetry patient having NSVT with permanent Afib.He also has severe anemia with a hemoglobin of 6.2 which is consistent with his hemoglobin obtained on last admit on 10/23/2019.  The patient initially refuses blood products due to risk of transmission of infection.  Chest x-ray was done which showed increase lobulated oval opacity in the right mid lung field which is a new finding compared to chest x-ray done on 10/23/2019.  BNP was done and was significant for a value of 210.  The patient was initially given a mg of Lasix IV with minimal response and this was followed by 120 mg IV per Cardiology.    ECHO 11/17/2019  · Severely decreased left ventricular systolic function. The estimated ejection fraction is 25%  · Concentric left ventricular hypertrophy.  · Global hypokinetic wall motion.  · Moderate right ventricular enlargement.  · Moderately reduced right  ventricular systolic function.  · Severe biatrial enlargement.  · Moderate mitral regurgitation.  · Moderate tricuspid regurgitation.  · The estimated PA systolic pressure is 40 mm Hg  · Elevated central venous pressure (15 mm Hg).  · Atrial fibrillation observed.    Hospital course:  Admitted  for ADHF on IV Lasix. Weight increasing with UOP minimal and Cr worsening. Patient seen by nephrology and cardiology for assistance. Persistent symptoms as above, with additional lethargy without change in mental status. Patient was started on IV  on 11/26 as well as lasix gtt. Diuril augmentation added. Patient with persistent anemia despite blood transfusions. GI was consulted with concern for occult bleeding. Patient denies active bleeding and refuses rectal examination.  The patient's counts remain above 7 after 3U pRBC. Patient planned for endoscopic evaluation, with EGD and colonoscopy without evidence of active bleeding (the latter limited by suboptimal prep). The patient has had sustained runs of VT so cardiology was consulted but feel this is actually more c/w AF with RVR and thus  is continued.  Patient with stagnant UOP so lasix changed to IVP.     Interval history:   Patient with improving UOP on lasix PO. He has no SOB. Patient discussed extensively with nephrology - stop  and transition to PO diuretics. I discussed this extensively with patient.  He reports dark stool but this may be due to iron supplementation. Plan to assess tolerance over 24 hours with potential d/c home with  on 12/8. All questions answered to patient satisfaction.    ROS:  As per HPI  General: no fever, no chills, no weight loss, no fatigue  Cardiovascular: no chest pain  Respiratory: no cough, no wheezes  All other systems reviewed & are negative.     Past Medical History:   Diagnosis Date    Anticoagulant long-term use     Cardiomegaly     Chronic combined systolic and diastolic congestive heart failure     Coronary artery  disease     Heart attack 2006    Hypertension     Hyperthyroidism, subclinical 1/2/2013    MI (myocardial infarction) 9/22/2013    MI in 2009      Paroxysmal atrial fibrillation     PE (pulmonary embolism) 1/1/2013    IN 2010     S/P ablation of atrial flutter 2008    Stroke 2009    no residual weaknesses     Past Surgical History:   Procedure Laterality Date    COLONOSCOPY N/A 12/2/2019    Procedure: COLONOSCOPY;  Surgeon: Scott Rosario MD;  Location: [facility] ([address] [place] [address]);  Service: Endoscopy;  Laterality: N/A;    ESOPHAGOGASTRODUODENOSCOPY N/A 12/2/2019    Procedure: EGD (ESOPHAGOGASTRODUODENOSCOPY);  Surgeon: Scott Rosario MD;  Location: [facility] ([address] [place] [address]);  Service: Endoscopy;  Laterality: N/A;    RADIOFREQUENCY ABLATION  01/08/2008    for atrial flutter     Social History     Socioeconomic History    Marital status: Single     Spouse name: Not on file    Number of children: Not on file    Years of education: Not on file    Highest education level: Not on file   Occupational History    Not on file   Social Needs    Financial resource strain: Not on file    Food insecurity:     Worry: Not on file     Inability: Not on file    Transportation needs:     Medical: Not on file     Non-medical: Not on file   Tobacco Use    Smoking status: Never Smoker    Smokeless tobacco: Never Used   Substance and Sexual Activity    Alcohol use: No    Drug use: No    Sexual activity: Never   Lifestyle    Physical activity:     Days per week: Not on file     Minutes per session: Not on file    Stress: Not on file   Relationships    Social connections:     Talks on phone: Not on file     Gets together: Not on file     Attends Yazidi service: Not on file     Active member of club or organization: Not on file     Attends meetings of clubs or organizations: Not on file     Relationship status: Not on file   Other Topics Concern    Not on file   Social History Narrative    Not on file          Objective:   Last 24 Hour Vital Signs:  BP  Min: 111/55  Max: 138/76  Temp  Av.9 °F (36.6 °C)  Min: 97.6 °F (36.4 °C)  Max: 98.4 °F (36.9 °C)  Pulse  Av.6  Min: 77  Max: 120  Resp  Av.6  Min: 16  Max: 20  SpO2  Av.1 %  Min: 92 %  Max: 96 %  Weight  Av.6 kg (259 lb 4.2 oz)  Min: 117.6 kg (259 lb 4.2 oz)  Max: 117.6 kg (259 lb 4.2 oz)  I/O last 3 completed shifts:  In: 1446 [P.O.:1446]  Out: 4200 [Urine:4200]    Physical Examination:  GEN: AAOx3, NAD, appears mildly short of breath  HEENT: NCAT, MMM, PERRL, EOMI, oropharynx clear +JVD with HJS  CV: RRR, no m/r, no S3/S4, warm, well perfused  RESP: CTAB, no wheezes/crackles, no increased WOB  ABD: soft, NTND, normoactive BS, no organomegaly  EXTR: no c/c, intact distal pulses x 4, BLE edema  NEURO: PERRL, EOMI, moving all four extremities, intact sensation to light touch, no focal deficits  SKIN: no rashes, lesions, or color changes  PSYCH: normal affect    Laboratory:  I have reviewed all pertinent lab results/findings within the past 24 hours.    Radiology:  I have reviewed all pertinent imaging results/findings within the past 24 hours.    Current Medications:     Infusions:       Scheduled:   aspirin  81 mg Oral Daily    ferrous sulfate  325 mg Oral Daily    fluticasone propionate  2 spray Each Nostril Daily    furosemide  80 mg Oral BID    hydrALAZINE  25 mg Oral TID    isosorbide dinitrate  20 mg Oral TID    magnesium sulfate IVPB  2 g Intravenous Once    mirtazapine  7.5 mg Oral QHS    pantoprazole  40 mg Oral Before breakfast    potassium chloride 10%  40 mEq Oral Once    senna-docusate 8.6-50 mg  1 tablet Oral BID    warfarin  5 mg Oral Daily        PRN:  sodium chloride, sodium chloride, calcium carbonate, diphenhydrAMINE, melatonin, methyl salicylate-menthol 15-10%, nitroGLYCERIN, ondansetron, promethazine (PHENERGAN) IVPB, sodium chloride 0.9%, traMADol    Prior records reviewed.    Assessment/Plan:     Hamlet  ZULMA Terrell is a 54 y.o.male with    Acute on chronic combined systolic and diastolic heart failure  Initial poor response to 80 mg of Lasix and patient given Lasix 120 mg IV and Diuril 500 mg IV on admission per Cardiology.  EF 23%.  Stop   Stop diuril  Changed lasix to PO  Consider metolazone augmentation  Continue Bidil as tolerated  Hold lopressor for now, consider reinitiation    Abnormal CT  Abnormal opacity in middle lobe of right lung  Non-smoker  Obtain CT of the chest once more euvolemic (could not lay flat)     JEAN-PIERRE on CKD3  Cr 1.9 > 2.3 > 3.2 > 3.7 > 3.2 >2.6>2.3 (baseline 1.6-1.7)  Suspect cardiorenal  Monitor on diuresis  Continue to monitor electrolytes   Nephrology consulted, appreciate recommendations    Essential hypertension  Adjust antiHTNsives as above     Atrial fibrillation, chronic  Chronic anticoagulation  INR 1.7  Okay to c/w coumadin to 7 mg daily  Daily INR's  HR controlled. Hold metoprolol with  on board     Iron deficiency anemia  Patient with LANDON, initially refused blood tranfusions  Patient now agrees to blood transfusion - transfused 4U pRBC  C/w daily iron  GI consult appreciated; endoscopic evaluation - no active bleeding noted     History of CVA (cerebrovascular accident)  No acute management     Chronic respiratory failure  1.5 L NC at home  Reports malfunctioning O2 tank at home - consult CM     DVT PPx: coumadin  Diet: cardiac 1200 cc  FULL CODE    Markus Howard MD  Hospital Medicine Staff  [facility]

## 2019-12-08 VITALS
OXYGEN SATURATION: 95 % | WEIGHT: 259.25 LBS | DIASTOLIC BLOOD PRESSURE: 77 MMHG | BODY MASS INDEX: 38.4 KG/M2 | RESPIRATION RATE: 18 BRPM | HEART RATE: 86 BPM | HEIGHT: 69 IN | SYSTOLIC BLOOD PRESSURE: 142 MMHG | TEMPERATURE: 98 F

## 2019-12-08 LAB
ABO + RH BLD: NORMAL
ANION GAP SERPL CALC-SCNC: 11 MMOL/L (ref 8–16)
ANION GAP SERPL CALC-SCNC: 15 MMOL/L (ref 8–16)
BASOPHILS # BLD AUTO: 0.07 K/UL (ref 0–0.2)
BASOPHILS NFR BLD: 1.1 % (ref 0–1.9)
BLD GP AB SCN CELLS X3 SERPL QL: NORMAL
BLD PROD TYP BPU: NORMAL
BLOOD UNIT EXPIRATION DATE: NORMAL
BLOOD UNIT TYPE CODE: 8400
BLOOD UNIT TYPE: NORMAL
BUN SERPL-MCNC: 72 MG/DL (ref 6–20)
BUN SERPL-MCNC: 80 MG/DL (ref 6–20)
CALCIUM SERPL-MCNC: 10.4 MG/DL (ref 8.7–10.5)
CALCIUM SERPL-MCNC: 9.7 MG/DL (ref 8.7–10.5)
CHLORIDE SERPL-SCNC: 100 MMOL/L (ref 95–110)
CHLORIDE SERPL-SCNC: 98 MMOL/L (ref 95–110)
CO2 SERPL-SCNC: 30 MMOL/L (ref 23–29)
CO2 SERPL-SCNC: 35 MMOL/L (ref 23–29)
CODING SYSTEM: NORMAL
CREAT SERPL-MCNC: 2 MG/DL (ref 0.5–1.4)
CREAT SERPL-MCNC: 2 MG/DL (ref 0.5–1.4)
DIFFERENTIAL METHOD: ABNORMAL
DISPENSE STATUS: NORMAL
EOSINOPHIL # BLD AUTO: 0.8 K/UL (ref 0–0.5)
EOSINOPHIL NFR BLD: 12 % (ref 0–8)
ERYTHROCYTE [DISTWIDTH] IN BLOOD BY AUTOMATED COUNT: 26 % (ref 11.5–14.5)
EST. GFR  (AFRICAN AMERICAN): 42.8 ML/MIN/1.73 M^2
EST. GFR  (AFRICAN AMERICAN): 42.8 ML/MIN/1.73 M^2
EST. GFR  (NON AFRICAN AMERICAN): 37 ML/MIN/1.73 M^2
EST. GFR  (NON AFRICAN AMERICAN): 37 ML/MIN/1.73 M^2
GLUCOSE SERPL-MCNC: 137 MG/DL (ref 70–110)
GLUCOSE SERPL-MCNC: 96 MG/DL (ref 70–110)
HCT VFR BLD AUTO: 22.7 % (ref 40–54)
HGB BLD-MCNC: 6.5 G/DL (ref 14–18)
HGB BLD-MCNC: 7.7 G/DL (ref 14–18)
IMM GRANULOCYTES # BLD AUTO: 0.04 K/UL (ref 0–0.04)
IMM GRANULOCYTES NFR BLD AUTO: 0.6 % (ref 0–0.5)
INR PPP: 1.7 (ref 0.8–1.2)
LYMPHOCYTES # BLD AUTO: 0.9 K/UL (ref 1–4.8)
LYMPHOCYTES NFR BLD: 14.5 % (ref 18–48)
MAGNESIUM SERPL-MCNC: 2 MG/DL (ref 1.6–2.6)
MAGNESIUM SERPL-MCNC: 2 MG/DL (ref 1.6–2.6)
MCH RBC QN AUTO: 23.6 PG (ref 27–31)
MCHC RBC AUTO-ENTMCNC: 28.6 G/DL (ref 32–36)
MCV RBC AUTO: 82 FL (ref 82–98)
MONOCYTES # BLD AUTO: 1.4 K/UL (ref 0.3–1)
MONOCYTES NFR BLD: 21.5 % (ref 4–15)
NEUTROPHILS # BLD AUTO: 3.2 K/UL (ref 1.8–7.7)
NEUTROPHILS NFR BLD: 50.3 % (ref 38–73)
NRBC BLD-RTO: 0 /100 WBC
PLATELET # BLD AUTO: 256 K/UL (ref 150–350)
PMV BLD AUTO: 10.5 FL (ref 9.2–12.9)
POTASSIUM SERPL-SCNC: 3.7 MMOL/L (ref 3.5–5.1)
POTASSIUM SERPL-SCNC: 3.8 MMOL/L (ref 3.5–5.1)
PROTHROMBIN TIME: 16.8 SEC (ref 9–12.5)
RBC # BLD AUTO: 2.76 M/UL (ref 4.6–6.2)
SODIUM SERPL-SCNC: 144 MMOL/L (ref 136–145)
SODIUM SERPL-SCNC: 145 MMOL/L (ref 136–145)
TRANS ERYTHROCYTES VOL PATIENT: NORMAL ML
WBC # BLD AUTO: 6.27 K/UL (ref 3.9–12.7)

## 2019-12-08 PROCEDURE — 99239 HOSP IP/OBS DSCHRG MGMT >30: CPT | Mod: ,,, | Performed by: HOSPITALIST

## 2019-12-08 PROCEDURE — 83735 ASSAY OF MAGNESIUM: CPT | Mod: 91

## 2019-12-08 PROCEDURE — 36430 TRANSFUSION BLD/BLD COMPNT: CPT

## 2019-12-08 PROCEDURE — 99239 PR HOSPITAL DISCHARGE DAY,>30 MIN: ICD-10-PCS | Mod: ,,, | Performed by: HOSPITALIST

## 2019-12-08 PROCEDURE — 36415 COLL VENOUS BLD VENIPUNCTURE: CPT

## 2019-12-08 PROCEDURE — 85610 PROTHROMBIN TIME: CPT

## 2019-12-08 PROCEDURE — 80048 BASIC METABOLIC PNL TOTAL CA: CPT | Mod: 91

## 2019-12-08 PROCEDURE — 85025 COMPLETE CBC W/AUTO DIFF WBC: CPT

## 2019-12-08 PROCEDURE — 86920 COMPATIBILITY TEST SPIN: CPT

## 2019-12-08 PROCEDURE — 85018 HEMOGLOBIN: CPT

## 2019-12-08 PROCEDURE — P9021 RED BLOOD CELLS UNIT: HCPCS

## 2019-12-08 PROCEDURE — 80048 BASIC METABOLIC PNL TOTAL CA: CPT

## 2019-12-08 PROCEDURE — 86850 RBC ANTIBODY SCREEN: CPT

## 2019-12-08 PROCEDURE — 83735 ASSAY OF MAGNESIUM: CPT

## 2019-12-08 RX ORDER — FERROUS SULFATE 325(65) MG
325 TABLET, DELAYED RELEASE (ENTERIC COATED) ORAL DAILY
Qty: 30 TABLET | Refills: 2 | Status: ON HOLD | OUTPATIENT
Start: 2019-12-08 | End: 2020-03-09

## 2019-12-08 RX ORDER — METOPROLOL TARTRATE 25 MG/1
25 TABLET, FILM COATED ORAL 2 TIMES DAILY
Qty: 60 TABLET | Refills: 2 | Status: ON HOLD | OUTPATIENT
Start: 2019-12-08 | End: 2020-01-17 | Stop reason: HOSPADM

## 2019-12-08 RX ORDER — WARFARIN 1 MG/1
7 TABLET ORAL DAILY
Qty: 210 TABLET | Refills: 2 | Status: ON HOLD | OUTPATIENT
Start: 2019-12-08 | End: 2020-03-09 | Stop reason: HOSPADM

## 2019-12-08 RX ORDER — HYDROCODONE BITARTRATE AND ACETAMINOPHEN 500; 5 MG/1; MG/1
TABLET ORAL
Status: DISCONTINUED | OUTPATIENT
Start: 2019-12-08 | End: 2019-12-08 | Stop reason: HOSPADM

## 2019-12-08 NOTE — NURSING
Plan of care reviewed with patient. Pt remains free of falls or injuries. He is cooperative tonight. VSS, Afib on tele, resting comfortably in his chair. Care clustered for pt to rest, at his request. Denies pain or discomfort at this time. He voices understanding of all meds and plan of care. TM

## 2019-12-08 NOTE — NURSING
"Beside offered administration of morning medications to patient, who refused earlier.  Patient stated, "No, get me water, I just ate my lunch."  "

## 2019-12-08 NOTE — PLAN OF CARE
[facility]    HOME HEALTH ORDERS  FACE TO FACE ENCOUNTER    Patient Name: Hamlet Terrell  YOB: 1965    PCP: Carlin Swift MD   PCP Address: [address] [place] [id number]  PCP Phone Number: [phone]  PCP Fax: [phone]    Encounter Date: 12/08/2019    Admit to Home Health    Diagnoses:  Active Hospital Problems    Diagnosis  POA    *Acute on chronic combined systolic and diastolic heart failure [I50.43]  Yes    GI bleed [K92.2]  Unknown    Elevated bilirubin [R17]  Unknown    Abdominal pain [R10.9]  Unknown    Shortness of breath [R06.02]  Yes    Symptomatic anemia [D64.9]  Unknown    JEAN-PIERRE (acute kidney injury) [N17.9]  Yes    Stage 3 chronic kidney disease [N18.3]  Yes    TAPAN (obstructive sleep apnea) [G47.33]  Yes    Personal history of MI (myocardial infarction) [I25.2]  Not Applicable    Atrial fibrillation, chronic [I48.20]  Yes     Chronic    Essential hypertension [I10]  Yes     Chronic    Chronic anticoagulation [Z79.01]  Not Applicable     Chronic    History of CVA (cerebrovascular accident) [Z86.73]  Not Applicable     Personal account, occurred in 2008/2009 at [facility].        Resolved Hospital Problems   No resolved problems to display.       No future appointments.  Follow-up Information     Carlin Swift MD In 1 week.    Specialty:  Family Medicine  Contact information:  [facility] [id number] [phone]                 I have seen and examined this patient face to face today. My clinical findings that support the need for the home health skilled services and home bound status are the following:  Weakness/numbness causing balance and gait disturbance due to Heart Failure and Weakness/Debility making it taxing to leave home.    Allergies:  Review of patient's allergies indicates:   Allergen Reactions    Acetaminophen      Itching    Oxycodone-acetaminophen      Other reaction(s):  Itching    Ace inhibitors Other (See Comments)     cough       Diet: cardiac diet and fluid restriction: 1500 cc    Activities: activity as tolerated and ambulate in house with assistance    Nursing:   SN to complete comprehensive assessment including routine vital signs. Instruct on disease process and s/s of complications to report to MD. Review/verify medication list sent home with the patient at time of discharge  and instruct patient/caregiver as needed. Frequency may be adjusted depending on start of care date.    Notify MD if SBP > 160 or < 90; DBP > 90 or < 50; HR > 120 or < 50; Temp > 101    CONSULTS:    Physical Therapy to evaluate and treat. Evaluate for home safety and equipment needs; Establish/upgrade home exercise program. Perform / instruct on therapeutic exercises, gait training, transfer training, and Range of Motion.  Occupational Therapy to evaluate and treat. Evaluate home environment for safety and equipment needs. Perform/Instruct on transfers, ADL training, ROM, and therapeutic exercises.    MISCELLANEOUS CARE:  Routine Skin for Bedridden Patients: Instruct patient/caregiver to apply moisture barrier cream to all skin folds and wet areas in perineal area daily and after baths and all bowel movements.  Heart Failure:      SN to instruct on the following:    Instruct on the definition of CHF.   Instruct on the signs/sympoms of CHF to be reported.   Instruct on and monitor daily weights.   Instruct on factors that cause exacerbation.   Instruct on action, dose, schedule, and side effects of medications.   Instruct on diet as prescribed.   Instruct on activity allowed.   Instruct on life-style modifications for life long management of CHF   SN to assess compliance with daily weights, diet, medications, fluid retention,    safety precautions, activities permitted and life-style modifications.   Additional 1-2 SN visits per week as needed for signs and symptoms     of CHF exacerbation.      For  Weight Gain > 2-3 lbs in 1 day or 4-6 lbs over 1 week notify PCP:  Obtain BMP lab test in 3 days  Home Oxygen:  Oxygen at 2 L/min nasal canula to be used:  At bedtime and As needed for SOB.    Medications: Review discharge medications with patient and family and provide education.      Current Discharge Medication List      START taking these medications    Details   ferrous sulfate 325 (65 FE) MG EC tablet Take 1 tablet (325 mg total) by mouth once daily.  Qty: 30 tablet, Refills: 2         CONTINUE these medications which have CHANGED    Details   metoprolol tartrate (LOPRESSOR) 25 MG tablet Take 1 tablet (25 mg total) by mouth 2 (two) times daily.  Qty: 60 tablet, Refills: 2      warfarin (COUMADIN) 1 MG tablet Take 7 tablets (7 mg total) by mouth Daily.  Qty: 210 tablet, Refills: 2         CONTINUE these medications which have NOT CHANGED    Details   aspirin (ECOTRIN) 81 MG EC tablet Take 81 mg by mouth once daily.      furosemide (LASIX) 80 MG tablet Take 1 tablet (80 mg total) by mouth 2 (two) times daily.  Qty: 60 tablet, Refills: 11      albuterol (PROVENTIL/VENTOLIN HFA) 90 mcg/actuation inhaler Inhale 1-2 puffs into the lungs every 6 (six) hours as needed for Wheezing. Rescue  Qty: 1 Inhaler, Refills: 0      isosorbide-hydrALAZINE 20-37.5 mg (BIDIL) 20-37.5 mg Tab Take 1 tablet by mouth 2 (two) times daily.  Qty: 60 tablet, Refills: 11      nitroGLYCERIN (NITROSTAT) 0.4 MG SL tablet Place 1 tablet (0.4 mg total) under the tongue every 5 (five) minutes as needed for Chest pain. Tablet, Sublingual Sublingual  Qty: 30 tablet, Refills: 11      pantoprazole (PROTONIX) 40 MG tablet Take 1 tablet (40 mg total) by mouth once daily.  Qty: 30 tablet, Refills: 11         STOP taking these medications       calcium carbonate (TUMS) 200 mg calcium (500 mg) chewable tablet Comments:   Reason for Stopping:         ramelteon (ROZEREM) 8 mg tablet Comments:   Reason for Stopping:         senna-docusate 8.6-50 mg  (PERICOLACE) 8.6-50 mg per tablet Comments:   Reason for Stopping:             I certify that this patient is confined to his home and needs intermittent skilled nursing care, physical therapy and occupational therapy.

## 2019-12-08 NOTE — NURSING
Patient offered 1500 hour BP Medication.  Patient refused by shaking his head no.  Informed patient that follow-up lab to check his Hg was going to be collected at 1630 hours.  Patient agreed to lab collect.

## 2019-12-08 NOTE — NURSING
Patient refused 1500 hour medications.  Blood tubing is being flushed and will be completed x 1 hour.  Called Selvin, Lab Client Services @ [phone], to verify time required between completion of blood transfusion and Hg lab collect. Jason called back and stated that lab collect must be 30 min to 1 hour after blood transfusion.

## 2019-12-08 NOTE — NURSING
Received and acknowledge discharge instructions ordered at 0943 hours.  Ordered to hold discharged for I unit of PRBCs and follow up lab draw to ensure Hgb > 7 .  Provided a copy of AVS to patient.  Discharge instructions explained to patient.  Discontinued PIVs in right forearm and left forearm, tips intact.  Removed telemetry.  Informed patient of future follow-up appointments.  Administered all ordered medications.  Explained medication regime to include new, continued, and discontinued medications.  Patient informed when next dose is due for all medications. MIS refused.  Discussed when to call the doctor. Discussed heart failure tips, diagnosing heart failure, treatment plan, diet, procedures, tracking weight, signs of a flare-up to include:  swelling, shortness of breath dizziness, chest pain and cough.  Recovery After Procedural Sedation (Adult), Blood Transfusion, Heart Failure, Coumadin and Chronic Kidney education completed.  Patient acknowledged understanding of all instructions. Transport notified.  Will continue to monitor patient until off unit.

## 2019-12-08 NOTE — NURSING
"Bedside requested permission to enter patient's room to assess vitals and to administer morning medications.  Patient stated it is okay.  Attempted to assess vital signs for the 2nd time, patient stated, My sleeping pill is just now kicking in."  Patient would not lift his arm so that bedside could assess BP.  Patient refused to allow bedside to scan his bracelet.  Morning medications have not been administered at this time.  KAREEM Howard M.D. notified.  "

## 2019-12-08 NOTE — NURSING
Results for DIANN BARRERA (MRN [id number]) as of 12/8/2019 13:59   Ref. Range 12/7/2019 05:52 12/7/2019 13:58 12/8/2019 03:08 12/8/2019 09:43 12/8/2019 10:00   Hemoglobin Latest Ref Range: 14.0 - 18.0 g/dL 7.4 (L)  6.5 (L)     Hematocrit Latest Ref Range: 40.0 - 54.0 % 25.7 (L)  22.7 (L)     KAREEM Howard M.D. Aware.  Type and Screen, and 1 unit PRBCs ordered.

## 2019-12-08 NOTE — NURSING
Patient offered due medications, and refused.  Patient was receptive while discharge instructions were presented.  Encouraged patient to allow staff to request wheelchair for discharge home, patient agreed.  Patient stated he will drive himself home.

## 2019-12-08 NOTE — NURSING
Patient asked PCT to come back later to assess weight.  Patient was positioned in recliner with blanket partially covering his head.  Patient was alert, and his eyes were open.   Bedside requested to assess VS, patient would not answer.  Bedside left patient's room, vs not assessed at this time.

## 2019-12-08 NOTE — DISCHARGE SUMMARY
Discharge Summary  Hospital Medicine    Attending Provider on Discharge: Markus Howard     Discharging Team:    [facility]  [facility]  [facility] [place] - CARDIOLOGY CONSULT STEPDOWN     Date of Admission:  11/24/2019         Date of Discharge:  12/8/2019      Diagnoses:     Principal Problem(s):   Acute on chronic combined systolic and diastolic heart failure     Secondary Problems:  Active Hospital Problems    Diagnosis    *Acute on chronic combined systolic and diastolic heart failure    GI bleed    Elevated bilirubin    Abdominal pain    Shortness of breath    Symptomatic anemia    JEAN-PIERRE (acute kidney injury)    Stage 3 chronic kidney disease    TAPAN (obstructive sleep apnea)    Personal history of MI (myocardial infarction)    Atrial fibrillation, chronic    Essential hypertension    Chronic anticoagulation    History of CVA (cerebrovascular accident)     Personal account, occurred in 2008/2009 at [facility].          Hospital Course:    HPI:  Patient of concern is a 54M HFrEF (EF 23%, moderate MR, moderate TR, diastolic dysfunction; multiple admissions in 2019, no ICD), with pulm HTN (PASP 47 mm Hg), Permanent AF/AFL s/p CTI with CVA in 2009 on Coumadin, CAD and obstructive sleep apnea who presented 11/24/19 with 1 week history of worsening SOB and MUÑOZ and 30 lb weight gain. Patient states compliance with fluid, salt and medication, however since last week having worsening LE edema with fluid weeping of his legs. Associated with MUÑOZ, Fatigue. He does have chronic severe anemia (refusing transfusion) with Hbg 6, additionally CXR demonstrating worsening RLL consolidation vs. effusion. Case discussed with cardiology in ED, given Lasix IVP with diuril with minimal UOP.      ECHO 11/17/2019  · Severely decreased left ventricular systolic function. The estimated ejection fraction is 25%  · Concentric left ventricular hypertrophy.  · Global hypokinetic wall  motion.  · Moderate right ventricular enlargement.  · Moderately reduced right ventricular systolic function.  · Severe biatrial enlargement.  · Moderate mitral regurgitation.  · Moderate tricuspid regurgitation.  · The estimated PA systolic pressure is 40 mm Hg  · Elevated central venous pressure (15 mm Hg).  · Atrial fibrillation observed.    Hospital course:  Patient admitted with acute on chronic combined heart failure. However, on 11/16 (day 3 admit) patient began having AMS / lethargy, worsening renal function with elevated BNP, Tbili trending up, with soft BP SBP 90s, requiring supplemental oxygen. Cardiology consulted for inotropic support, on telemetry patient having NSVT with permanent Afib. He also has severe anemia with a hemoglobin of 6.2 which is consistent with his hemoglobin obtained on last admit on 10/23/2019.  The patient initially refuses blood products due to risk of transmission of infection.  Chest x-ray was done which showed increase lobulated oval opacity in the right mid lung field which is a new finding compared to chest x-ray done on 10/23/2019. BNP was done and was significant for a value of 210.  The patient was initially given a mg of Lasix IV with minimal response and this was followed by 120 mg IV per Cardiology.  With still some stagnation in improvement, patient was started on IV  on 11/26 as well as lasix gtt. Diuril augmentation added. Patient with persistent anemia despite blood transfusions. GI was consulted with concern for occult bleeding. Patient denies active bleeding and refuses rectal examination.  The patient's counts remain above 7 after 5U pRBC. Patient planned for endoscopic evaluation, with EGD and colonoscopy without evidence of active bleeding (the latter limited by suboptimal prep). The patient has had sustained runs of VT so cardiology was consulted but feel this is actually more c/w AF with RVR and thus  was continued.  The patient was able to finally be  weaned off , IV lasix, and tolerated transition to PO diuretics prior to d/c. The patient will require f/u for imaging of CT chest given findings of question of fluid in fissure v mass. This was attempted inpatient but he was not able to lay flat.     Of note, throughout admission the patient refused many evaluation for interventions including HTS eval for advanced options including LVAD/txp, EP eval for ICD, and other measures. He remains full code but was resistant to most interventions and medical discussions. Given his trajectory, further palliative discussions may be warranted. I initiated some of these discussions and patient was generally disinterested.  Please see below for further details:    As per HPI  General: no fever, no chills, no weight loss, no fatigue  Cardiovascular: no chest pain  Respiratory: no cough, no wheezes  All other systems reviewed & are negative.     GEN: AAOx3, NAD, appears mildly short of breath  HEENT: NCAT, MMM, PERRL, EOMI, oropharynx clear +JVD with HJS (has chronic TVR)  CV: RRR, no m/r, no S3/S4, warm, well perfused  RESP: CTAB, no wheezes/crackles, no increased WOB  ABD: soft, NTND, normoactive BS, no organomegaly  EXTR: no c/c, intact distal pulses x 4, BLE edema  NEURO: PERRL, EOMI, moving all four extremities, intact sensation to light touch, no focal deficits  SKIN: no rashes, lesions, or color changes  PSYCH: normal affect    Acute on chronic combined systolic and diastolic heart failure  Patient refuses advanced options evaluation.  Patient hesitant but now more interested in ICD eval.  EF 23%.  Stopped   Stopped diuril  Changed lasix to PO  Consider metolazone augmentation  Continue Bidil as tolerated  Reinitiate metorpolol     Abnormal CT  Abnormal opacity in middle lobe of right lung  Non-smoker  Obtain CT of the chest once more euvolemic  Recommend completion as outpatient     JEAN-PIERRE on CKD3  Cr 1.9 > 2.3 > 3.2 > 3.7 > 3.2 >2.6>2.3 (baseline 1.6-1.7)  Suspect  cardiorenal  Monitor on diuresis  Continue to monitor electrolytes   Nephrology consulted, appreciate recommendations  Patient's renal function now back to baseline    Essential hypertension  Adjust antiHTNsives as above     Atrial fibrillation, chronic  Chronic anticoagulation  INR 1.7  Okay to c/w coumadin to 7 mg daily  Daily INR's  HR controlled.   Resume metoprolol     Iron deficiency anemia  Patient with LANDON, initially refused blood tranfusions  Patient now agrees to blood transfusion - transfused 5U pRBC over admission  C/w daily iron  GI consult appreciated; endoscopic evaluation - no active bleeding noted     History of CVA (cerebrovascular accident)  No acute management     Chronic respiratory failure  1.5 L NC at home  Reports malfunctioning O2 tank at home - consult CM    Significant Diagnostic Studies:   Labs:   Recent Labs     12/08/19  0308   WBC 6.27   RBC 2.76*   HGB 6.5*   HCT 22.7*      MCV 82   MCH 23.6*   MCHC 28.6*   GRAN 50.3  3.2   LYMPH 14.5*  0.9*   MONO 21.5*  1.4*   EOS 0.8*      Recent Labs     12/08/19  0308   *      K 3.8   CL 98   CO2 35*   BUN 80*   CREATININE 2.0*   CALCIUM 9.7   ANIONGAP 11   MG 2.0     Radiology:   Results for orders placed during the hospital encounter of 07/30/19   X-Ray Chest 1 View    Narrative EXAMINATION:  XR CHEST 1 VIEW    CLINICAL HISTORY:  shortness of breath, CHF;    TECHNIQUE:  Single frontal view of the chest was performed.    COMPARISON:  Non 07/12/2019 e    FINDINGS:  Cardiomegaly and pulmonary vascular congestion.  Opacification at the right lung base probably underlying pleural effusion and a combination of edema versus airspace consolidation.  The lung apices are clear.      Impression See above      Electronically signed by: Jeffrey Clark MD  Date:    07/30/2019  Time:    12:57      Results for orders placed during the hospital encounter of 11/24/19   X-Ray Chest PA And Lateral    Narrative EXAMINATION:  XR CHEST PA AND  LATERAL    CLINICAL HISTORY:  Shortness of breath    TECHNIQUE:  PA and lateral views of the chest were performed.    COMPARISON:  November 24, 2019 at 13:21    FINDINGS:  Heart remains enlarged.  Pulmonary vascularity is similar.  Lobulated 12 cm opacity on the lateral again may represent some loculated fluid.  This has developed since the prior x-ray of October 23.  Persistent increased opacification at the right base.      Impression Somewhat lobulated oval opacity right midlung field presumably loculated fluid as it is new compared to October 2019.  If further evaluation needed CT suggested.      Electronically signed by: Cristofer Roldan MD  Date:    11/24/2019  Time:    14:45      Results for orders placed during the hospital encounter of 08/12/17   CT Abdomen Pelvis With Contrast    Narrative Time of Procedure: 08/12/17 22:22:25  Accession # [id number]    CT abdomen and pelvis    Technique: The patient was surveyed from the lung bases through the pelvis after the administration of 75 cc Omni 350 IV contrast. The data was reconstructed for coronal, sagittal, and axial images.    Clinical indication: 51-year-old male with history of right lower quadrant pain.    Comparison: CT abdomen pelvis 9/22/2013.    Findings:    There are small bilateral pleural effusions with associated atelectatic changes in the lung bases.      The heart is enlarged. No evidence of pericardial effusion.    The liver is enlarged and there are mild morphologic changes of chronic liver disease including lobar redistribution and mildly nodular contour. No focal hepatic lesions are seen. There is reflux of contrast into hepatic veins which can be seen with right-sided heart failure. The gallbladder is unremarkable.  No intrahepatic or extrahepatic biliary ductal dilatation is identified. The spleen is nonenlarged.      The stomach, pancreas, and adrenal glands are unremarkable.    The kidneys are normal in size and location. There is a  subcentimeter cyst in the midpole of the right kidney. No hydronephrosis is seen.  The ureters appear normal in course and caliber.    Scattered colonic diverticula are identified. The visualized loops of small and large bowel show no evidence of obstruction or inflammation. The appendix is identified and appears normal.  There is a small amount of free fluid in the abdomen and pelvis. No intraperitoneal free air is noted.    There is no evidence of lymph node enlargement in the abdomen. Mildly prominent bilateral inguinal lymph nodes measuring up to 1.6 cm in short axis.    The abdominal aorta is normal in course and caliber without significant atherosclerotic calcifications.    Urinary bladder is predominantly decompressed but demonstrates symmetric wall thickening on the sagittal images.    The osseous structures demonstrate demonstrate age-appropriate degenerative changes.      The extraperitoneal soft tissues demonstrate mild generalized body wall edema.    Impression No acute process identified in the abdomen and pelvis.    Morphologic changes of chronic liver disease/early cirrhosis. Small volume ascites.    Suspect urinary bladder wall thickening, which could be exaggerated by under distention. Suggest correlation with urinalysis and clinical symptoms.    Colonic diverticulosis without evidence of diverticulitis.    Cardiomegaly with findings consistent with 3rd spacing of fluid/volume overload.      ______________________________________     Electronically signed by resident: PATRICE OSCAR MD  Date:     08/12/17  Time:    22:52            As the supervising and teaching physician, I personally reviewed the images and resident's interpretation and I agree with the findings.          Electronically signed by: Gage Oleary  Date:     08/12/17  Time:    23:18       Results for orders placed during the hospital encounter of 07/02/19   CT Head Without Contrast    Narrative EXAMINATION:  CT HEAD WITHOUT  CONTRAST    CLINICAL HISTORY:  Stroke;Inability to move LLE.;    TECHNIQUE:  Low dose axial images were obtained through the head.  Coronal and sagittal reformations were also performed. Contrast was not administered.    COMPARISON:  May 4, 2018.    FINDINGS:  Remote left ALEJANDRA infarct, unchanged.  There is no evidence of acute infarct, hemorrhage, or mass.  The ventricular system is normal in size.  No mass-effect or midline shift.  There are no abnormal extra-axial fluid collections.  The paranasal sinuses and mastoid air cells are clear.  The calvarium appears intact.  .      Impression No acute intracranial abnormalities.    No significant change compared to prior study.      Electronically signed by: Kelvin Partida MD  Date:    07/02/2019  Time:    23:25      Cardiac Studies: TTE    Significant Treatments/Procedures:   IV diuresis, IV   Multiple blood transfusions  EGD/C-scope 12/2/2019    Consults while in Hospital:   Cardiology, Gastroenterology and Nephrology    Discharge Medications:      Current Discharge Medication List      START taking these medications    Details   ferrous sulfate 325 (65 FE) MG EC tablet Take 1 tablet (325 mg total) by mouth once daily.  Qty: 30 tablet, Refills: 2         CONTINUE these medications which have CHANGED    Details   metoprolol tartrate (LOPRESSOR) 25 MG tablet Take 1 tablet (25 mg total) by mouth 2 (two) times daily.  Qty: 60 tablet, Refills: 2      warfarin (COUMADIN) 1 MG tablet Take 7 tablets (7 mg total) by mouth Daily.  Qty: 210 tablet, Refills: 2         CONTINUE these medications which have NOT CHANGED    Details   aspirin (ECOTRIN) 81 MG EC tablet Take 81 mg by mouth once daily.      furosemide (LASIX) 80 MG tablet Take 1 tablet (80 mg total) by mouth 2 (two) times daily.  Qty: 60 tablet, Refills: 11      albuterol (PROVENTIL/VENTOLIN HFA) 90 mcg/actuation inhaler Inhale 1-2 puffs into the lungs every 6 (six) hours as needed for Wheezing. Rescue  Qty: 1  Inhaler, Refills: 0      isosorbide-hydrALAZINE 20-37.5 mg (BIDIL) 20-37.5 mg Tab Take 1 tablet by mouth 2 (two) times daily.  Qty: 60 tablet, Refills: 11      nitroGLYCERIN (NITROSTAT) 0.4 MG SL tablet Place 1 tablet (0.4 mg total) under the tongue every 5 (five) minutes as needed for Chest pain. Tablet, Sublingual Sublingual  Qty: 30 tablet, Refills: 11      pantoprazole (PROTONIX) 40 MG tablet Take 1 tablet (40 mg total) by mouth once daily.  Qty: 30 tablet, Refills: 11         STOP taking these medications       calcium carbonate (TUMS) 200 mg calcium (500 mg) chewable tablet Comments:   Reason for Stopping:         ramelteon (ROZEREM) 8 mg tablet Comments:   Reason for Stopping:         senna-docusate 8.6-50 mg (PERICOLACE) 8.6-50 mg per tablet Comments:   Reason for Stopping:              Discharge Diet:cardiac diet and fluid restriction: 1200 cc    Activity: activity as tolerated    Discharged Condition: Stable    Discharge Disposition: Home or Self Care    Follow-up:   Ambulatory referrals to coumadin clinic, cardiology, nephrology, and electrophysiology (for ICD eval) made.  Patient should also f/u PCP 1 weeks time.    Studies/Results pending on discharge:   None    35 minutes.    Markus Howard MD  [facility]

## 2019-12-08 NOTE — NURSING
Patient refused labs.  Informed  that blood is infusing and labs cannot be drawn at this time. Informed patient that labs will need to be drawn after blood is completed to ensure Hgb is >7.  Patient agreed at this time.

## 2019-12-09 ENCOUNTER — ANTI-COAG VISIT (OUTPATIENT)
Dept: CARDIOLOGY | Facility: CLINIC | Age: 54
End: 2019-12-09
Payer: MEDICAID

## 2019-12-09 DIAGNOSIS — I48.20 ATRIAL FIBRILLATION, CHRONIC: ICD-10-CM

## 2019-12-09 DIAGNOSIS — Z79.01 CHRONIC ANTICOAGULATION: ICD-10-CM

## 2019-12-09 PROCEDURE — 93793 PR ANTICOAGULANT MGMT FOR PT TAKING WARFARIN: ICD-10-PCS | Mod: ,,,

## 2019-12-09 PROCEDURE — 93793 ANTICOAG MGMT PT WARFARIN: CPT | Mod: ,,,

## 2019-12-09 NOTE — PROGRESS NOTES
Patient admitted for CHF 11/24/19 - 12/8/19, during admit patient initially refused PRBC transfusion but eventually consented and HGB above 7 after 5 units PRBCS, GI consulted with EGD and colonoscopy (limitied view due to sub-optimal prep) but no evidence of bleeding noted. Throughout admission the patient refused many evaluation for interventions including HTS eval for advanced options including LVAD/txp, EP eval for ICD, and other measures.  Per discharge AVS patient started on Ferrous Sulfate and changes how he takes Metoprolol Tartrate (LOPRESSOR), Warfarin (COUMADIN) 7 mg daily (patient now has 1 mg strength Coumadin tablets with 4 mg and 6 mg tablets he was prescribed previously).  Calendar updated on doses of Coumadin given during admit.  Per 's note staff was unable to secure accepting agencies for home health.  I called [facility] and spoke to [place] who stated he was discharged from their services on 11/18/19 for noncomplainace.  Chart routed to pharmacist to review.

## 2019-12-09 NOTE — PLAN OF CARE
12/09/19 0637   Final Note   Assessment Type Final Discharge Note   Anticipated Discharge Disposition Home   Hospital Follow Up  Appt(s) scheduled? No

## 2019-12-12 NOTE — PHYSICIAN QUERY
PT Name: Hamlet Barrera  MR #: [id number]    Physician Query Form - Respiratory Condition Clarification      CDS/: Ruth Berg RN              Contact information: [phone]    This form is a permanent document in the medical record.    Query Date: December 12, 2019    By submitting this query, we are merely seeking further clarification of documentation. Please utilize your independent clinical judgment when addressing the question(s) below.    The Medical record contains the following   Indicators   Supporting Clinical Findings Location in Medical Record   x   SOB, MUÑOZ, Wheezing, Productive Cough, Use of Accessory Muscles, etc. He is oriented to person, place, and time. He appears distressed.   Morbidly obese male with labored respirations acutely ill-appearing cooperative with exam    H&P 11/25   x   Acute/Chronic Illness Acute on chronic combined systolic and diastolic heart failure   Essential hypertension   Atrial fibrillation, chronic   Stage 3 chronic kidney disease   Microcytic anemia   TAPAN (obstructive sleep apnea)        H&P 11/25      Radiology Findings     x   Respiratory Distress or Failure Pulmonary/Chest: He is in respiratory distress.   Labored respirations       Chronic respiratory failure   1.5 L NC at home   Reports malfunctioning O2 tank at home - consult CM       Acute on chronic respiratory failure      H&P 11/24          Progress Note 11/25       Hospital Medicine      Consult 11/26       Cardiology      Hypoxia or Hypercapnia     x   RR         ABGs         O2 sat Vital Signs (24h Range):   Resp: [15-22] 21   SpO2: [94 %-100 %] 99 %       Results for HAMLET BARRERA (MRN [id number]) as of 12/12/2019 10:30   Ref. Range 11/26/2019 15:47   POC PH Latest Ref Range: 7.35 - 7.45  7.352   POC PCO2 Latest Ref Range: 35 - 45 mmHg 47.6 (H)   POC PO2 Latest Ref Range: 80 - 100 mmHg 85   POC BE Latest Ref Range: -2 to 2 mmol/L 1   POC HCO3 Latest Ref Range: 24 - 28 mmol/L 26.4   POC SATURATED O2  Latest Ref Range: 95 - 100 % 96   POC TCO2 Latest Ref Range: 23 - 27 mmol/L 28 (H)   Flow Unknown 2   Sample Unknown ARTERIAL       H&P 11/24  Point of care 11/26            BiPAP/Intubation     x   Supplemental O2 Pulmonary/Chest:   Rales in BL lower lung fields.   3L of NC    Ed Provider Note 11/24   x   Home O2, Oxygen Dependence  Chronic respiratory failure   1.5 L NC at home    Discharge Summary 12/8      Treatment        Other       Respiratory failure can be acute, chronic or both. It is generally further specified as hypoxic, hypercapnic or both. Lastly, it is important to identify an etiology, if known or suspected.   References::  https://www.acphospitalist.org/archives/2013/10/coding.htm http://Adviesmanager.nl/acute-respiratory-failure-know     The clinical guidelines noted below are only system guidelines, and do not replace the providers clinical judgment.    Provider, please specify diagnosis or diagnoses associated with above clinical findings.       Doctor, please clarify the acuity of the respiratory failure.      [ X ] Other Acute and (on) Chronic Respiratory Failure      [   ] Other Chronic Respiratory Failure     [   ] Other Respiratory Diagnosis (please specify): _________________________________     [   ]  Clinically Undetermined       Please document in your progress notes daily for the duration of treatment until resolved and include in your discharge summary.

## 2019-12-24 ENCOUNTER — HOSPITAL ENCOUNTER (INPATIENT)
Facility: HOSPITAL | Age: 54
LOS: 25 days | Discharge: HOSPICE | DRG: 291 | End: 2020-01-18
Attending: EMERGENCY MEDICINE | Admitting: HOSPITALIST
Payer: MEDICAID

## 2019-12-24 DIAGNOSIS — R06.02 SHORTNESS OF BREATH: ICD-10-CM

## 2019-12-24 DIAGNOSIS — R60.0 LOWER EXTREMITY EDEMA: ICD-10-CM

## 2019-12-24 DIAGNOSIS — R94.31 EKG ABNORMALITIES: ICD-10-CM

## 2019-12-24 DIAGNOSIS — I50.43 ACUTE ON CHRONIC COMBINED SYSTOLIC AND DIASTOLIC CONGESTIVE HEART FAILURE: ICD-10-CM

## 2019-12-24 DIAGNOSIS — I50.9 CONGESTIVE HEART FAILURE, UNSPECIFIED HF CHRONICITY, UNSPECIFIED HEART FAILURE TYPE: Primary | ICD-10-CM

## 2019-12-24 DIAGNOSIS — Z79.01 LONG TERM CURRENT USE OF ANTICOAGULANT: ICD-10-CM

## 2019-12-24 PROBLEM — K92.2 GI BLEED: Status: RESOLVED | Noted: 2019-11-29 | Resolved: 2019-12-24

## 2019-12-24 PROBLEM — J96.01 ACUTE HYPOXEMIC RESPIRATORY FAILURE: Status: RESOLVED | Noted: 2019-10-23 | Resolved: 2019-12-24

## 2019-12-24 PROBLEM — R10.9 ABDOMINAL PAIN: Status: RESOLVED | Noted: 2019-11-29 | Resolved: 2019-12-24

## 2019-12-24 PROBLEM — D64.9 SYMPTOMATIC ANEMIA: Status: RESOLVED | Noted: 2019-10-23 | Resolved: 2019-12-24

## 2019-12-24 PROBLEM — R07.89 ATYPICAL CHEST PAIN: Status: RESOLVED | Noted: 2017-10-31 | Resolved: 2019-12-24

## 2019-12-24 PROBLEM — I13.0 BENIGN HYPERTENSIVE HEART AND KIDNEY DISEASE WITH HF AND CKD: Status: ACTIVE | Noted: 2019-12-24

## 2019-12-24 PROBLEM — E87.6 HYPOKALEMIA: Status: RESOLVED | Noted: 2017-04-03 | Resolved: 2019-12-24

## 2019-12-24 PROBLEM — R79.1 ELEVATED INR: Status: RESOLVED | Noted: 2019-04-22 | Resolved: 2019-12-24

## 2019-12-24 PROBLEM — M25.572 LEFT ANKLE PAIN: Status: RESOLVED | Noted: 2018-07-11 | Resolved: 2019-12-24

## 2019-12-24 LAB
ABO + RH BLD: NORMAL
ALBUMIN SERPL BCP-MCNC: 3.3 G/DL (ref 3.5–5.2)
ALP SERPL-CCNC: 278 U/L (ref 55–135)
ALT SERPL W/O P-5'-P-CCNC: 23 U/L (ref 10–44)
ANION GAP SERPL CALC-SCNC: 12 MMOL/L (ref 8–16)
AST SERPL-CCNC: 36 U/L (ref 10–40)
BASOPHILS # BLD AUTO: 0.05 K/UL (ref 0–0.2)
BASOPHILS NFR BLD: 0.7 % (ref 0–1.9)
BILIRUB SERPL-MCNC: 1.4 MG/DL (ref 0.1–1)
BLD GP AB SCN CELLS X3 SERPL QL: NORMAL
BNP SERPL-MCNC: 261 PG/ML (ref 0–99)
BUN SERPL-MCNC: 77 MG/DL (ref 6–30)
BUN SERPL-MCNC: 78 MG/DL (ref 6–20)
CALCIUM SERPL-MCNC: 9.7 MG/DL (ref 8.7–10.5)
CHLORIDE SERPL-SCNC: 101 MMOL/L (ref 95–110)
CHLORIDE SERPL-SCNC: 105 MMOL/L (ref 95–110)
CO2 SERPL-SCNC: 27 MMOL/L (ref 23–29)
CREAT SERPL-MCNC: 1.6 MG/DL (ref 0.5–1.4)
CREAT SERPL-MCNC: 1.8 MG/DL (ref 0.5–1.4)
DIFFERENTIAL METHOD: ABNORMAL
EOSINOPHIL # BLD AUTO: 0.7 K/UL (ref 0–0.5)
EOSINOPHIL NFR BLD: 9.2 % (ref 0–8)
ERYTHROCYTE [DISTWIDTH] IN BLOOD BY AUTOMATED COUNT: 25.3 % (ref 11.5–14.5)
EST. GFR  (AFRICAN AMERICAN): 48.6 ML/MIN/1.73 M^2
EST. GFR  (NON AFRICAN AMERICAN): 42 ML/MIN/1.73 M^2
GLUCOSE SERPL-MCNC: 121 MG/DL (ref 70–110)
GLUCOSE SERPL-MCNC: 125 MG/DL (ref 70–110)
HCT VFR BLD AUTO: 26.4 % (ref 40–54)
HCT VFR BLD CALC: 25 %PCV (ref 36–54)
HGB BLD-MCNC: 7.3 G/DL (ref 14–18)
IMM GRANULOCYTES # BLD AUTO: 0.02 K/UL (ref 0–0.04)
IMM GRANULOCYTES NFR BLD AUTO: 0.3 % (ref 0–0.5)
INR PPP: 1.5 (ref 0.8–1.2)
LYMPHOCYTES # BLD AUTO: 0.8 K/UL (ref 1–4.8)
LYMPHOCYTES NFR BLD: 10.3 % (ref 18–48)
MCH RBC QN AUTO: 24 PG (ref 27–31)
MCHC RBC AUTO-ENTMCNC: 27.7 G/DL (ref 32–36)
MCV RBC AUTO: 87 FL (ref 82–98)
MONOCYTES # BLD AUTO: 1.7 K/UL (ref 0.3–1)
MONOCYTES NFR BLD: 22.7 % (ref 4–15)
NEUTROPHILS # BLD AUTO: 4.2 K/UL (ref 1.8–7.7)
NEUTROPHILS NFR BLD: 56.8 % (ref 38–73)
NRBC BLD-RTO: 0 /100 WBC
PLATELET # BLD AUTO: 223 K/UL (ref 150–350)
PMV BLD AUTO: 9.7 FL (ref 9.2–12.9)
POC IONIZED CALCIUM: 1.14 MMOL/L (ref 1.06–1.42)
POC TCO2 (MEASURED): 28 MMOL/L (ref 23–29)
POTASSIUM BLD-SCNC: 3.9 MMOL/L (ref 3.5–5.1)
POTASSIUM SERPL-SCNC: 4 MMOL/L (ref 3.5–5.1)
PROT SERPL-MCNC: 8.6 G/DL (ref 6–8.4)
PROTHROMBIN TIME: 14.9 SEC (ref 9–12.5)
RBC # BLD AUTO: 3.04 M/UL (ref 4.6–6.2)
SAMPLE: ABNORMAL
SODIUM BLD-SCNC: 143 MMOL/L (ref 136–145)
SODIUM SERPL-SCNC: 144 MMOL/L (ref 136–145)
TROPONIN I SERPL DL<=0.01 NG/ML-MCNC: 0.13 NG/ML (ref 0–0.03)
WBC # BLD AUTO: 7.46 K/UL (ref 3.9–12.7)

## 2019-12-24 PROCEDURE — 80053 COMPREHEN METABOLIC PANEL: CPT

## 2019-12-24 PROCEDURE — 93010 EKG 12-LEAD: ICD-10-PCS | Mod: ,,, | Performed by: INTERNAL MEDICINE

## 2019-12-24 PROCEDURE — 99285 PR EMERGENCY DEPT VISIT,LEVEL V: ICD-10-PCS | Mod: ,,, | Performed by: PHYSICIAN ASSISTANT

## 2019-12-24 PROCEDURE — 99285 EMERGENCY DEPT VISIT HI MDM: CPT | Mod: 25

## 2019-12-24 PROCEDURE — 86850 RBC ANTIBODY SCREEN: CPT

## 2019-12-24 PROCEDURE — 25000003 PHARM REV CODE 250: Performed by: HOSPITALIST

## 2019-12-24 PROCEDURE — 63600175 PHARM REV CODE 636 W HCPCS: Performed by: PHYSICIAN ASSISTANT

## 2019-12-24 PROCEDURE — 83880 ASSAY OF NATRIURETIC PEPTIDE: CPT

## 2019-12-24 PROCEDURE — 99223 PR INITIAL HOSPITAL CARE,LEVL III: ICD-10-PCS | Mod: ,,, | Performed by: HOSPITALIST

## 2019-12-24 PROCEDURE — 96376 TX/PRO/DX INJ SAME DRUG ADON: CPT

## 2019-12-24 PROCEDURE — 84484 ASSAY OF TROPONIN QUANT: CPT

## 2019-12-24 PROCEDURE — 12000002 HC ACUTE/MED SURGE SEMI-PRIVATE ROOM

## 2019-12-24 PROCEDURE — 99223 1ST HOSP IP/OBS HIGH 75: CPT | Mod: ,,, | Performed by: HOSPITALIST

## 2019-12-24 PROCEDURE — 63600175 PHARM REV CODE 636 W HCPCS: Performed by: HOSPITALIST

## 2019-12-24 PROCEDURE — 93010 ELECTROCARDIOGRAM REPORT: CPT | Mod: ,,, | Performed by: INTERNAL MEDICINE

## 2019-12-24 PROCEDURE — 93005 ELECTROCARDIOGRAM TRACING: CPT

## 2019-12-24 PROCEDURE — 80047 BASIC METABLC PNL IONIZED CA: CPT

## 2019-12-24 PROCEDURE — 85610 PROTHROMBIN TIME: CPT

## 2019-12-24 PROCEDURE — 99285 EMERGENCY DEPT VISIT HI MDM: CPT | Mod: ,,, | Performed by: PHYSICIAN ASSISTANT

## 2019-12-24 PROCEDURE — 85025 COMPLETE CBC W/AUTO DIFF WBC: CPT

## 2019-12-24 PROCEDURE — 96374 THER/PROPH/DIAG INJ IV PUSH: CPT

## 2019-12-24 RX ORDER — AMOXICILLIN 250 MG
1 CAPSULE ORAL 2 TIMES DAILY PRN
Status: DISCONTINUED | OUTPATIENT
Start: 2019-12-24 | End: 2020-01-18 | Stop reason: HOSPADM

## 2019-12-24 RX ORDER — SODIUM CHLORIDE 0.9 % (FLUSH) 0.9 %
5 SYRINGE (ML) INJECTION
Status: DISCONTINUED | OUTPATIENT
Start: 2019-12-24 | End: 2020-01-13

## 2019-12-24 RX ORDER — SODIUM CHLORIDE 0.9 % (FLUSH) 0.9 %
10 SYRINGE (ML) INJECTION
Status: DISCONTINUED | OUTPATIENT
Start: 2019-12-24 | End: 2020-01-13

## 2019-12-24 RX ORDER — IBUPROFEN 200 MG
24 TABLET ORAL
Status: DISCONTINUED | OUTPATIENT
Start: 2019-12-24 | End: 2020-01-18 | Stop reason: HOSPADM

## 2019-12-24 RX ORDER — ONDANSETRON 2 MG/ML
8 INJECTION INTRAMUSCULAR; INTRAVENOUS EVERY 8 HOURS PRN
Status: DISCONTINUED | OUTPATIENT
Start: 2019-12-24 | End: 2020-01-18 | Stop reason: HOSPADM

## 2019-12-24 RX ORDER — METOPROLOL TARTRATE 25 MG/1
25 TABLET, FILM COATED ORAL 2 TIMES DAILY
Status: DISCONTINUED | OUTPATIENT
Start: 2019-12-24 | End: 2019-12-25

## 2019-12-24 RX ORDER — TALC
6 POWDER (GRAM) TOPICAL NIGHTLY PRN
Status: DISCONTINUED | OUTPATIENT
Start: 2019-12-24 | End: 2020-01-18 | Stop reason: HOSPADM

## 2019-12-24 RX ORDER — HYDROCODONE BITARTRATE AND ACETAMINOPHEN 5; 325 MG/1; MG/1
1 TABLET ORAL EVERY 4 HOURS PRN
Status: DISCONTINUED | OUTPATIENT
Start: 2019-12-24 | End: 2019-12-27

## 2019-12-24 RX ORDER — GLUCAGON 1 MG
1 KIT INJECTION
Status: DISCONTINUED | OUTPATIENT
Start: 2019-12-24 | End: 2020-01-18 | Stop reason: HOSPADM

## 2019-12-24 RX ORDER — ISOSORBIDE DINITRATE AND HYDRALAZINE HYDROCHLORIDE 37.5; 2 MG/1; MG/1
1 TABLET ORAL 2 TIMES DAILY
Status: DISCONTINUED | OUTPATIENT
Start: 2019-12-24 | End: 2020-01-18 | Stop reason: HOSPADM

## 2019-12-24 RX ORDER — ASPIRIN 81 MG/1
81 TABLET ORAL DAILY
Status: DISCONTINUED | OUTPATIENT
Start: 2019-12-25 | End: 2020-01-18 | Stop reason: HOSPADM

## 2019-12-24 RX ORDER — ACETAMINOPHEN 325 MG/1
650 TABLET ORAL EVERY 4 HOURS PRN
Status: DISCONTINUED | OUTPATIENT
Start: 2019-12-24 | End: 2020-01-18 | Stop reason: HOSPADM

## 2019-12-24 RX ORDER — FUROSEMIDE 10 MG/ML
80 INJECTION INTRAMUSCULAR; INTRAVENOUS
Status: COMPLETED | OUTPATIENT
Start: 2019-12-24 | End: 2019-12-24

## 2019-12-24 RX ORDER — IBUPROFEN 200 MG
16 TABLET ORAL
Status: DISCONTINUED | OUTPATIENT
Start: 2019-12-24 | End: 2020-01-18 | Stop reason: HOSPADM

## 2019-12-24 RX ORDER — FERROUS SULFATE 325(65) MG
325 TABLET, DELAYED RELEASE (ENTERIC COATED) ORAL DAILY
Status: DISCONTINUED | OUTPATIENT
Start: 2019-12-25 | End: 2020-01-18 | Stop reason: HOSPADM

## 2019-12-24 RX ORDER — HYDROCODONE BITARTRATE AND ACETAMINOPHEN 10; 325 MG/1; MG/1
1 TABLET ORAL EVERY 4 HOURS PRN
Status: DISCONTINUED | OUTPATIENT
Start: 2019-12-24 | End: 2019-12-27

## 2019-12-24 RX ADMIN — HYDROCODONE BITARTRATE AND ACETAMINOPHEN 1 TABLET: 10; 325 TABLET ORAL at 10:12

## 2019-12-24 RX ADMIN — FUROSEMIDE 10 MG/HR: 10 INJECTION, SOLUTION INTRAMUSCULAR; INTRAVENOUS at 10:12

## 2019-12-24 RX ADMIN — FUROSEMIDE 80 MG: 10 INJECTION, SOLUTION INTRAMUSCULAR; INTRAVENOUS at 07:12

## 2019-12-24 RX ADMIN — METOPROLOL TARTRATE 25 MG: 25 TABLET ORAL at 10:12

## 2019-12-24 RX ADMIN — HYDRALAZINE HYDROCHLORIDE AND ISOSORBIDE DINITRATE 1 TABLET: 37.5; 2 TABLET, FILM COATED ORAL at 10:12

## 2019-12-25 LAB
ALBUMIN SERPL BCP-MCNC: 3.3 G/DL (ref 3.5–5.2)
ALP SERPL-CCNC: 261 U/L (ref 55–135)
ALT SERPL W/O P-5'-P-CCNC: 22 U/L (ref 10–44)
ANION GAP SERPL CALC-SCNC: 11 MMOL/L (ref 8–16)
ANION GAP SERPL CALC-SCNC: 12 MMOL/L (ref 8–16)
AST SERPL-CCNC: 33 U/L (ref 10–40)
BASOPHILS # BLD AUTO: 0.07 K/UL (ref 0–0.2)
BASOPHILS NFR BLD: 1.1 % (ref 0–1.9)
BILIRUB SERPL-MCNC: 1.7 MG/DL (ref 0.1–1)
BUN SERPL-MCNC: 80 MG/DL (ref 6–20)
BUN SERPL-MCNC: 84 MG/DL (ref 6–20)
CALCIUM SERPL-MCNC: 9.4 MG/DL (ref 8.7–10.5)
CALCIUM SERPL-MCNC: 9.5 MG/DL (ref 8.7–10.5)
CHLORIDE SERPL-SCNC: 103 MMOL/L (ref 95–110)
CHLORIDE SERPL-SCNC: 103 MMOL/L (ref 95–110)
CO2 SERPL-SCNC: 26 MMOL/L (ref 23–29)
CO2 SERPL-SCNC: 28 MMOL/L (ref 23–29)
CREAT SERPL-MCNC: 2 MG/DL (ref 0.5–1.4)
CREAT SERPL-MCNC: 2.1 MG/DL (ref 0.5–1.4)
DIFFERENTIAL METHOD: ABNORMAL
EOSINOPHIL # BLD AUTO: 0.7 K/UL (ref 0–0.5)
EOSINOPHIL NFR BLD: 11 % (ref 0–8)
ERYTHROCYTE [DISTWIDTH] IN BLOOD BY AUTOMATED COUNT: 25 % (ref 11.5–14.5)
EST. GFR  (AFRICAN AMERICAN): 40.3 ML/MIN/1.73 M^2
EST. GFR  (AFRICAN AMERICAN): 42.8 ML/MIN/1.73 M^2
EST. GFR  (NON AFRICAN AMERICAN): 34.9 ML/MIN/1.73 M^2
EST. GFR  (NON AFRICAN AMERICAN): 37 ML/MIN/1.73 M^2
GLUCOSE SERPL-MCNC: 111 MG/DL (ref 70–110)
GLUCOSE SERPL-MCNC: 94 MG/DL (ref 70–110)
HCT VFR BLD AUTO: 25.7 % (ref 40–54)
HGB BLD-MCNC: 7.2 G/DL (ref 14–18)
IMM GRANULOCYTES # BLD AUTO: 0.02 K/UL (ref 0–0.04)
IMM GRANULOCYTES NFR BLD AUTO: 0.3 % (ref 0–0.5)
INR PPP: 1.6 (ref 0.8–1.2)
INR PPP: 1.6 (ref 0.8–1.2)
LYMPHOCYTES # BLD AUTO: 0.7 K/UL (ref 1–4.8)
LYMPHOCYTES NFR BLD: 10.1 % (ref 18–48)
MAGNESIUM SERPL-MCNC: 2.3 MG/DL (ref 1.6–2.6)
MCH RBC QN AUTO: 24.2 PG (ref 27–31)
MCHC RBC AUTO-ENTMCNC: 28 G/DL (ref 32–36)
MCV RBC AUTO: 87 FL (ref 82–98)
MONOCYTES # BLD AUTO: 1.5 K/UL (ref 0.3–1)
MONOCYTES NFR BLD: 23.5 % (ref 4–15)
NEUTROPHILS # BLD AUTO: 3.5 K/UL (ref 1.8–7.7)
NEUTROPHILS NFR BLD: 54 % (ref 38–73)
NRBC BLD-RTO: 0 /100 WBC
PHOSPHATE SERPL-MCNC: 4 MG/DL (ref 2.7–4.5)
PLATELET # BLD AUTO: 224 K/UL (ref 150–350)
PMV BLD AUTO: 10 FL (ref 9.2–12.9)
POTASSIUM SERPL-SCNC: 4.4 MMOL/L (ref 3.5–5.1)
POTASSIUM SERPL-SCNC: 4.4 MMOL/L (ref 3.5–5.1)
PROT SERPL-MCNC: 8.4 G/DL (ref 6–8.4)
PROTHROMBIN TIME: 15.3 SEC (ref 9–12.5)
PROTHROMBIN TIME: 15.3 SEC (ref 9–12.5)
RBC # BLD AUTO: 2.97 M/UL (ref 4.6–6.2)
SODIUM SERPL-SCNC: 140 MMOL/L (ref 136–145)
SODIUM SERPL-SCNC: 143 MMOL/L (ref 136–145)
WBC # BLD AUTO: 6.55 K/UL (ref 3.9–12.7)

## 2019-12-25 PROCEDURE — 20600001 HC STEP DOWN PRIVATE ROOM

## 2019-12-25 PROCEDURE — 85025 COMPLETE CBC W/AUTO DIFF WBC: CPT

## 2019-12-25 PROCEDURE — 63600175 PHARM REV CODE 636 W HCPCS: Performed by: HOSPITALIST

## 2019-12-25 PROCEDURE — 85610 PROTHROMBIN TIME: CPT

## 2019-12-25 PROCEDURE — 84100 ASSAY OF PHOSPHORUS: CPT

## 2019-12-25 PROCEDURE — 99233 SBSQ HOSP IP/OBS HIGH 50: CPT | Mod: ,,, | Performed by: HOSPITALIST

## 2019-12-25 PROCEDURE — 83735 ASSAY OF MAGNESIUM: CPT

## 2019-12-25 PROCEDURE — 36415 COLL VENOUS BLD VENIPUNCTURE: CPT

## 2019-12-25 PROCEDURE — 80048 BASIC METABOLIC PNL TOTAL CA: CPT

## 2019-12-25 PROCEDURE — 80053 COMPREHEN METABOLIC PANEL: CPT

## 2019-12-25 PROCEDURE — 99233 PR SUBSEQUENT HOSPITAL CARE,LEVL III: ICD-10-PCS | Mod: ,,, | Performed by: HOSPITALIST

## 2019-12-25 PROCEDURE — 25000003 PHARM REV CODE 250: Performed by: HOSPITALIST

## 2019-12-25 RX ORDER — DOBUTAMINE HYDROCHLORIDE 400 MG/100ML
2.5 INJECTION, SOLUTION INTRAVENOUS CONTINUOUS
Status: DISCONTINUED | OUTPATIENT
Start: 2019-12-25 | End: 2020-01-18 | Stop reason: HOSPADM

## 2019-12-25 RX ORDER — FUROSEMIDE 10 MG/ML
80 INJECTION INTRAMUSCULAR; INTRAVENOUS
Status: COMPLETED | OUTPATIENT
Start: 2019-12-25 | End: 2019-12-25

## 2019-12-25 RX ADMIN — FUROSEMIDE 15 MG/HR: 10 INJECTION, SOLUTION INTRAMUSCULAR; INTRAVENOUS at 05:12

## 2019-12-25 RX ADMIN — HYDRALAZINE HYDROCHLORIDE AND ISOSORBIDE DINITRATE 1 TABLET: 37.5; 2 TABLET, FILM COATED ORAL at 09:12

## 2019-12-25 RX ADMIN — WARFARIN SODIUM 7 MG: 2 TABLET ORAL at 05:12

## 2019-12-25 RX ADMIN — FERROUS SULFATE TAB EC 325 MG (65 MG FE EQUIVALENT) 325 MG: 325 (65 FE) TABLET DELAYED RESPONSE at 09:12

## 2019-12-25 RX ADMIN — METOPROLOL TARTRATE 25 MG: 25 TABLET ORAL at 09:12

## 2019-12-25 RX ADMIN — FUROSEMIDE 80 MG: 10 INJECTION, SOLUTION INTRAMUSCULAR; INTRAVENOUS at 03:12

## 2019-12-25 RX ADMIN — ASPIRIN 81 MG: 81 TABLET, COATED ORAL at 09:12

## 2019-12-25 NOTE — PLAN OF CARE
Pts Lasix increased to 15mg IV cont and received an additional dose of 80 IVP. Pt has an order for  cont IV infusion; Pt is refusing IV assess Anita SORIANO is aware of noncompliance of Pt. Pt remains free from injury/falls for shift. Educated Pt on meds no questions at this time. VSS.  No complaints of CP, SOB, pain/discomfort  Bed locked in lowest position, call bell within reach. All questions addressed.  Will continue to monitor.

## 2019-12-25 NOTE — PLAN OF CARE
Patient remains free from falls and injuries through out shift. Patient AAOx4. VSS. Patient is in rate controlled a-fib on tele monitor. Patient denies chest pain. Patient does have some SOB. 2L of NC placed on patient with O2 sats greater than 95%. Lasix gtt 10mg infusing at 5mL/h per MAR. X1 dose of IVP Lasix 80mg given prior to starting gtt. K 4.0, BUN/SCR 78/1.8, INR 1.5, Trop 0.128 and H/H 7.3/26.4. Patient schedule to receive coumadin 7mg PO this evening. CXR completed and resulted. Will f/u with labs this morning. Last Echo on 11/19 revealed EF of 25%. PRN pain medication given for generalized pain d/t swelling per patient. POC is to diuresis patient. Plan of care reviewed with patient. Patient verbalizes understanding of plan.  Will continue to monitor.

## 2019-12-25 NOTE — PROGRESS NOTES
Progress Note  Hospital Medicine    Provider team: [facility]  Admit Date: 12/24/2019  Encounter Date: 12/25/2019     SUBJECTIVE:     Follow-up Visit for: Acute on chronic combined systolic and diastolic congestive heart failure    HPI (See H&P for complete P,F,SHx):  54 y.o. male with chronic combined systolic and diastolic heart failure (EF 25%), afib on Coumadin, CAD, CVA, and history of PE who presents to the ED for evaluation of shortness of breath and edema.  He was just admitted to Hospital Medicine from 11/24-12/8 for a CHF exacerbation.  During that admission, he was evaluated by Cardiology who started him on Dobutamine and Diuril.  He was resistant to HTS evaluation during that stay, which included recommendations for LVAD, transplant, and ICD.  He was discharged home on Lasix 80mg PO BID.  He endorses compliance with his diuretic regimen, but despite this and following a low salt diet, he has continued to gain weight, have lower extremity and abdominal swelling, as well as shortness of breath.  He is so short of breath that he has to sleep in a chair, and has been unable to sleep because of his breathing.  He denies any chest pain.  He does have a cough that is mostly dry, but occasionally productive of blood tinged sputum.  He was discharged weight a weight of 259lbs, and claims that is around his dry weight.  He is currently 286lbs.       Upon arrival to the ED, vitals were /83, HR 88, RR 22.  Labs showed Hemoglobin 7.3, INR 1.5, Creatinine 1.8, Alk Phos 278,  with Trop 0.128.  CXR showed cardiomegaly and pulmonary edema, along with a masslike opacity.  CT chest was attempted to be ordered to evaluate the mass, but he was unable to lie flat to get it evaluated.  He was given Lasix 80mg IV and was admitted to Hospital Medicine for further management.    TTE 11/27/2019  · Severely decreased left ventricular systolic function.   · The estimated ejection fraction is 25%  · Concentric left  ventricular hypertrophy.  · Global hypokinetic wall motion.  · Moderate right ventricular enlargement.  · Moderately reduced right ventricular systolic function.  · Severe biatrial enlargement.  · Moderate mitral regurgitation.  · Moderate tricuspid regurgitation.  · The estimated PA systolic pressure is 40 mm Hg  · Elevated central venous pressure (15 mm Hg).  · Atrial fibrillation observed.    Interval history:  Patient with not enough UOP. Lasix gtt increased and  added after discussion with comanagement, who are very familiar with patient. Patient endorses not enough UOP as well. He is hypervolemic on exam. He still has persistent HF symptoms as above.    Review of Systems:  Review of Systems   Constitutional: Negative for chills and fever.   HENT: Negative for congestion and sore throat.    Eyes: Negative for photophobia, pain and discharge.   Respiratory: Positive for cough and shortness of breath. Negative for hemoptysis and sputum production.    Cardiovascular: Positive for orthopnea, leg swelling and PND. Negative for chest pain and palpitations.   Gastrointestinal: Negative for abdominal pain, diarrhea, nausea and vomiting.   Genitourinary: Negative for dysuria and urgency.   Musculoskeletal: Negative for myalgias and neck pain.   Skin: Negative for itching and rash.   Neurological: Negative for sensory change, focal weakness and headaches.   Endo/Heme/Allergies: Negative for polydipsia. Does not bruise/bleed easily.   Psychiatric/Behavioral: Negative for depression and suicidal ideas.       OBJECTIVE:       Intake/Output Summary (Last 24 hours) at 12/25/2019 1048  Last data filed at 12/25/2019 0736  Gross per 24 hour   Intake 720 ml   Output 800 ml   Net -80 ml     Vital Signs Range (Last 24H):  Temp:  [97.5 °F (36.4 °C)-98.4 °F (36.9 °C)]   Pulse:  []   Resp:  [14-22]   BP: ()/(55-83)   SpO2:  [95 %-100 %]   Body mass index is 42.03 kg/m².    Objective:  General Appearance:  Comfortable,  "well-appearing, in no acute distress and not in pain.    Vital signs: (most recent): Blood pressure 100/66, pulse 87, temperature 97.4 °F (36.3 °C), temperature source Oral, resp. rate 20, height 5' 9" (1.753 m), weight 129.1 kg (284 lb 9.8 oz), SpO2 97 %.  No fever.    Output: Producing urine and producing stool.    HEENT: Normal HEENT exam.  (+JVD)    Lungs:  Normal effort.  Breath sounds clear to auscultation.  He is not in respiratory distress.  There are rales.  No wheezes.    Heart: Normal rate.  Regular rhythm.  S1 normal and S2 normal.  Positive for murmur.    Chest: Symmetric chest wall expansion.   Abdomen: Abdomen is soft.  Bowel sounds are normal.   There is no abdominal tenderness.     Extremities: Normal range of motion.  There is venous stasis and dependent edema.  There is no deformity, effusion or local swelling.    Pulses: Distal pulses are intact.    Neurological: Patient is alert and oriented to person, place and time.  Normal strength.    Pupils:  Pupils are equal, round, and reactive to light.    Skin:  Warm and dry.      Medications:  Medication list was reviewed in EPIC and changes noted under Assessment/Plan and MAR.    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 [I13.0]  Yes    Elevated alkaline phosphatase level [R74.8]  Yes    Coronary artery disease [I25.10]  Yes     Chronic    Stage 3 chronic kidney disease [N18.3]  Yes    Subtherapeutic " international normalized ratio (INR) [R79.1]  Yes    Personal history of cerebral embolism [Z86.79]  Not Applicable    Personal history of venous thrombosis and embolism [Z86.718]  Not Applicable    Personal history of MI (myocardial infarction) [I25.2]  Not Applicable    Essential hypertension [I10]  Yes     Chronic    Atrial fibrillation, chronic [I48.20]  Yes     Chronic    Chronic anticoagulation [Z79.01]  Not Applicable     Chronic    Cardiomyopathy [I42.9]  Yes     Chronic      Resolved Hospital Problems   No resolved problems to display.      Acute on Chronic Combined Systolic and Diastolic Heart Failure  Cardiomyopathy  · CHF Pathway initiated  · Last 2D echo Nov 2019 with EF 25%  ·  and CXR with pulmonary edema and cardiomegaly  · IV diuresis increased to Lasix 15 gtt and monitor response.   · Goal diuresis 2-3L/day.  Home diuretic dose:  Lasix 80mg PO BID  · BID BMP (with morning labs and at 4pm)  · Strict I&Os with daily weights.  Dry weight 250lbs.  Admit weight 286lbs.  · Cardiology co-management to see 12/26  · Stop BB, okay to c/w BiDil     Chronic AFib  Long Term Use of Anticoagulants  Subtherapeutic INR  · Chronic issue  · Hold BB  · Continue Warfarin - Pharmacy consulted for dosing management     Essential HTN  · Chronic and stable  · Continue Bidil BID  · Hold BB     CAD  History of MI  · Chronic and stable  · Continue Aspirin 81mg PO daily  · Continue Bidil BID  · Hold BB     Anemia  · Hgb 7.3 on admit  · Received 5 units PRBCs on prior hospital admission  · Continue PO iron  · Type and screen obtained  · Monitor for bleeding/need for transfusion     History of VTE  History of Stroke  · Chronic and stable  · Continue Aspirin 81mg PO daily  · Continue Warfarin as above     Stage 3 CKD  · Chronic and stable  · Monitor with diuresis     Elevated Alk Phos  · Chronic and stable  · Monitor     Benign Hypertensive Heart and Kidney Disease with HF and CKD  · As above     Abnormal  CT  · Needs CT chest when able to lie flat or outpt to evaluate masslike opacity seen on CXR        Diet:  Low Sodium, 1.5L fluid restriction  VTE PPx:  Warfarin  Goals of Care:  Full     High Risk Conditions:   Patient is currently on drug therapy requiring intensive monitoring for toxicity: Lasix gtt     Anticipated discharge date and disposition:   Pending diuresis. Likely will need HH for home IV . Palliative care consultation may be warranted. Co-management requests consultation on 12/26.  Markus Howard MD  [facility]

## 2019-12-25 NOTE — ED NOTES
Admitted to floor. Diagnosis, medications, & POC discussed with patient. Patient verbalized understanding. All questions and concerns answered. No needs expressed at the time. Pt is awake, alert and oriented x4 with no acute distress noted. Respirations even and unlabored. Per wheelchair out of ED to floor.

## 2019-12-25 NOTE — PROGRESS NOTES
"   12/25/19 0041 12/25/19 0042 12/25/19 0049   Vital Signs   Pulse 85 82 87   Heart Rate Source Monitor Monitor Monitor   Resp 18  --   --    SpO2 96 %  --   --    /75 (!) 114/55 (!) 107/56   MAP (mmHg) 93 77 74   BP Location Left arm Left arm  --    BP Method  --   --   --    Patient Position Sitting Sitting Lying      12/25/19 0050 12/25/19 0056 12/25/19 0059   Vital Signs   Pulse 91  --   --    Heart Rate Source Monitor  --   --    Resp  --   --   --    SpO2  --   --   --    BP (!) 97/55 (!) 98/58 90/60   MAP (mmHg) 66  --   --    BP Location Left arm Left arm Left arm   BP Method  --  Manual Manual   Patient Position Sitting Sitting Sitting     Patient VSS as above. Patient stated that "I almost passed out." patient states that he dizzy and lightheaded. On call for [facility] paged. Awaiting page back. Will cont to monitor patient.   "

## 2019-12-25 NOTE — NURSING TRANSFER
Nursing Transfer Note      12/25/2019     Transfer to CSU rm 355 from ED    Transfer via wheelchair    Transfer with Lasix gtt infusing and cont cardiac monitoring     Transported by transport     Medicines sent: Lasix gtt    Chart send with patient: No    Notified: Patient will notify family.    Patient reassessed at: 12/25 @ 0015    Upon arrival to floor: Patient admitted to CSU Patient alert and oriented to room. Cardiac monitoring maintained throughout transfer. Patient connected to telemetry, assessed, denies any pain, oriented to room, and instructed to call for assistance or any needs. Call bell in reach. Reviewed goals for today. Will continue to monitor

## 2019-12-25 NOTE — ED PROVIDER NOTES
"Encounter Date: 12/24/2019       History     Chief Complaint   Patient presents with    Shortness of Breath     reports fluid retention      54-year-old male with PMHx of HTN, CHF (EF 25%), A fib (on Coumadin), CAD, hyperthyroidism, CVA, and PE who presents to the ED with c/o SOB. Pt was admitted 11/24/19-12/8/19 for CHF exacerbation and was started on  and diuril. Pt was resistant at that time for HTS eval and most interventions, including LVAD/txp or ICD. Pt also had hgb of 6.2 and had EGD/Colonoscopy with no evidence of active bleeding. Per pt, he returns to the ED for "fluid." He states that his symptoms have gradually worsened since his discharge. He reports persistent shortness of breath/MUÑOZ and LE edema. He has been taking 80 mg Lasix BID. He was unable to apply his compression stockings as they were cutting into his calves. He also reports that on his way to the ED this evening he coughed up a strip of dark red blood in his sputum. He also has noticed abdominal swelling for the past few days. Denies blood in stool, melena, fevers, chills, abdominal pain, n/v/d, chest pain, numbness, weakness, confusion, headache, or any other medical complaints.     The history is provided by the patient.     Review of patient's allergies indicates:   Allergen Reactions    Acetaminophen      Itching    Oxycodone-acetaminophen      Other reaction(s): Itching    Ace inhibitors Other (See Comments)     cough     Past Medical History:   Diagnosis Date    Anticoagulant long-term use     Cardiomegaly     Chronic combined systolic and diastolic congestive heart failure     Coronary artery disease     Heart attack 2006    Hypertension     Hyperthyroidism, subclinical 1/2/2013    MI (myocardial infarction) 9/22/2013    MI in 2009      Paroxysmal atrial fibrillation     PE (pulmonary embolism) 1/1/2013    IN 2010     S/P ablation of atrial flutter 2008    Stroke 2009    no residual weaknesses     Past Surgical " History:   Procedure Laterality Date    COLONOSCOPY N/A 12/2/2019    Procedure: COLONOSCOPY;  Surgeon: Scott Rosario MD;  Location: [facility] ([address] [place] [address]);  Service: Endoscopy;  Laterality: N/A;    ESOPHAGOGASTRODUODENOSCOPY N/A 12/2/2019    Procedure: EGD (ESOPHAGOGASTRODUODENOSCOPY);  Surgeon: Scott Rosario MD;  Location: [facility] ([address] [place] [address]);  Service: Endoscopy;  Laterality: N/A;    RADIOFREQUENCY ABLATION  01/08/2008    for atrial flutter     Family History   Problem Relation Age of Onset    Hypertension Mother     Stroke Mother     Hypertension Father     Alcohol abuse Father     Hypertension Sister     Hypertension Brother      Social History     Tobacco Use    Smoking status: Never Smoker    Smokeless tobacco: Never Used   Substance Use Topics    Alcohol use: No    Drug use: No     Review of Systems   Constitutional: Negative for chills, fatigue and fever.   HENT: Negative for congestion.    Eyes: Negative for visual disturbance.   Respiratory: Positive for shortness of breath.    Cardiovascular: Positive for leg swelling. Negative for chest pain and palpitations.   Gastrointestinal: Negative for abdominal pain, anal bleeding, blood in stool, constipation, diarrhea, nausea, rectal pain and vomiting.   Genitourinary: Negative for dysuria, frequency and hematuria.   Musculoskeletal: Negative for back pain and neck pain.   Neurological: Negative for dizziness, weakness, numbness and headaches.   Psychiatric/Behavioral: Negative for confusion.       Physical Exam     Initial Vitals [12/24/19 1812]   BP Pulse Resp Temp SpO2   (!) 154/83 88 (!) 22 97.7 °F (36.5 °C) 95 %      MAP       --         Physical Exam    Nursing note and vitals reviewed.  Constitutional: He appears well-developed and well-nourished.   HENT:   Head: Normocephalic and atraumatic.   Eyes: Conjunctivae and EOM are normal. Pupils are equal, round, and reactive to light.   Neck: Normal range of motion. Neck supple. JVD present.    Cardiovascular: Normal rate, regular rhythm, normal heart sounds and intact distal pulses.   Pulmonary/Chest: He exhibits no tenderness.   Rales in bilateral lower lung fields.    Abdominal: Soft. He exhibits distension. There is no tenderness. There is no rebound and no guarding.   Diffuse abdominal distension, no abdominal TTP.    Musculoskeletal: Normal range of motion. He exhibits edema. He exhibits no tenderness.   3+ pitting edema bilaterally with fluid filled pockets.    Neurological: He is alert and oriented to person, place, and time. He has normal strength.   Strength and sensation intact and symmetric in upper and lower extremities.    Skin: Skin is warm.   Psychiatric: He has a normal mood and affect.         ED Course   Procedures  Labs Reviewed   CBC W/ AUTO DIFFERENTIAL - Abnormal; Notable for the following components:       Result Value    RBC 3.04 (*)     Hemoglobin 7.3 (*)     Hematocrit 26.4 (*)     Mean Corpuscular Hemoglobin 24.0 (*)     Mean Corpuscular Hemoglobin Conc 27.7 (*)     RDW 25.3 (*)     Lymph # 0.8 (*)     Mono # 1.7 (*)     Eos # 0.7 (*)     Lymph% 10.3 (*)     Mono% 22.7 (*)     Eosinophil% 9.2 (*)     All other components within normal limits    Narrative:     shared lavender   COMPREHENSIVE METABOLIC PANEL - Abnormal; Notable for the following components:    Glucose 121 (*)     BUN, Bld 78 (*)     Creatinine 1.8 (*)     Total Protein 8.6 (*)     Albumin 3.3 (*)     Total Bilirubin 1.4 (*)     Alkaline Phosphatase 278 (*)     eGFR if  48.6 (*)     eGFR if non  42.0 (*)     All other components within normal limits    Narrative:     shared lavender   TROPONIN I - Abnormal; Notable for the following components:    Troponin I 0.128 (*)     All other components within normal limits    Narrative:     shared lavender   B-TYPE NATRIURETIC PEPTIDE - Abnormal; Notable for the following components:     (*)     All other components within normal  limits    Narrative:     shared lavender   PROTIME-INR - Abnormal; Notable for the following components:    Prothrombin Time 14.9 (*)     INR 1.5 (*)     All other components within normal limits    Narrative:     shared lavender   ISTAT PROCEDURE - Abnormal; Notable for the following components:    POC Glucose 125 (*)     POC BUN 77 (*)     POC Creatinine 1.6 (*)     POC Hematocrit 25 (*)     All other components within normal limits   TYPE & SCREEN   ISTAT CHEM8          Imaging Results          X-Ray Chest AP Portable (Final result)  Result time 12/24/19 19:09:07    Final result by Zeb Hirsch MD (12/24/19 19:09:07)                 Impression:      Masslike opacity overlying the right lower lung zone, similar to prior exam.  Right pleural fluid not excluded.  Chest CT recommended for further evaluation of right lung masslike opacity.    Patchy bilateral pulmonary opacities, similar to prior exam.    Cardiomegaly.    Electronically signed by resident: Cristofer Reddy  Date:    12/24/2019  Time:    18:48    Electronically signed by: Zeb Hirsch MD  Date:    12/24/2019  Time:    19:09             Narrative:    EXAMINATION:  XR CHEST AP PORTABLE    CLINICAL HISTORY:  CHF;    TECHNIQUE:  Single frontal view of the chest was performed.    COMPARISON:  Chest radiograph 12/03/2019    FINDINGS:  Cardiac leads overlie the chest wall.    Redemonstration of extensive bilateral patchy opacities, more prominent on the right with a focal area of masslike opacity in the right lower lung zone.  No significant change from prior exam.  Left costophrenic angle is visible and right sided pleural fluid not excluded noting presence of opacification.  No pneumothorax.    The cardiac silhouette is enlarged.  Hilar and mediastinal contours are unchanged.    Bones are intact.                                 Medical Decision Making:   History:   Old Medical Records: I decided to obtain old medical records.  Old Records Summarized: records  "from clinic visits and records from previous admission(s).       <> Summary of Records: See HPI for details of recent admission.   Initial Assessment:   54-year-old male with PMHx of HTN, CHF (EF 25%), A fib (on Coumadin), CAD, hyperthyroidism, CVA, and PE who presents to the ED with c/o SOB. Pt was admitted 11/24/19-12/8/19 for CHF exacerbation and was started on  and diuril. Per pt, he returns to the ED for "fluid." He states that his symptoms have gradually worsened since his discharge. VSS. Afebrile. Rales in bilateral lower lung fields. Diffuse abdominal distension, negative TTP. 3+ pitting TAMIA.   Differential Diagnosis:   DDx includes but is not limited to CHF exacerbation, ACS, pulmonary edema, electrolyte abnormality, anemia, pneumonia, malignancy.  Considered but do not suspect DVT as pt's edema is symmetric and he is on long term anticoagulants.   Independently Interpreted Test(s):   I have ordered and independently interpreted X-rays - see summary below.  I have ordered and independently interpreted EKG Reading(s) - see prior notes  Clinical Tests:   Lab Tests: Reviewed and Ordered  Radiological Study: Ordered and Reviewed  Medical Tests: Ordered and Reviewed  ED Management:  Will obtain basic labs, PT/INR, troponin, BNP, and CXR. Will give 80 mg IV Lasix and place pt on 2 L of supplemental O2 for comfort.     Hemoglobin stable at 7.3. Electrolytes within normal limites. Cr 1.8, which is about pt's baseline. INR 1.5. Troponin 0.128 and , which are similar to previous values. CXR with mass like opacity overlying the right lower lung zone, similar to prior exam. Right pleural fluid not excluded. Patchy bilateral pulmonary opacities, similar to prior exam. Pt unable to tolerate chest CT as he cannot lie supine due to his SOB.     Discussed with Hospital Medicine, who will admit the patient to their service for further management of CHF exacerbation. Reviewed plan with patient, who expressed " understanding and is agreeable.     I have discussed the treatment and management of this patient with my supervising physician, and we agree on the plan of care.      Humera Charles PA-C                Attending Attestation:     Physician Attestation Statement for NP/PA:       Other NP/PA Attestation Additions:    History of Present Illness: Pt admitted 11/24-12/8 with severe CHF, pulm HTN, AFib on Coumadin, presewnting with increased LE edema since discharge. Hb baseline at 6, with active GIB and given 5 U PRBC with EGD and colonscopy, c/b V-Tach requiring pressors with worsening kidney function. Pt refused heart txp evaluation.       Today with increasing shortness of breath and increasing abdominal and bilateral lower extremity edema.  Denies chest pain, fever chills, cough.   Physical Exam: Appears fatigued, dyspneic, decreased b/s b/l, 4+ b/l pitting edema   Medical Decision Making: I discussed the patient's care with Advanced Practice Clinician, and did see this patient with the APC.  I reviewed their note and agree with the history, physical, assessment, diagnosis, treatment, and disposition plan provided by the Advanced Practice Clinician.     Complexity: HIGH                               Clinical Impression:       ICD-10-CM ICD-9-CM   1. Congestive heart failure, unspecified HF chronicity, unspecified heart failure type I50.9 428.0   2. Shortness of breath R06.02 786.05   3. Lower extremity edema R60.0 782.3   4. Long term current use of anticoagulant Z79.01 V58.61         Disposition:   Disposition: Admitted  Condition: Fair                     Humera Charles PA-C  12/25/19 0504       Rosana Buckley MD  12/26/19 0885

## 2019-12-25 NOTE — CONSULTS
Nutrition consult received regarding low sodium diet education.  Pt educated at time of last admit (12/4), pt w/ no further questions at this time.    Thanks!  RADHA Be, LDN

## 2019-12-25 NOTE — H&P
Hospital Medicine  History and Physical      Patient Name: Hamlet Terrell  MRN:  [id number]  Hospital Medicine Team: [facility] MED  Antoinette López MD  Date of Admission:  12/24/2019     Principal Problem:  Acute on chronic combined systolic and diastolic congestive heart failure   Primary Care Physician: Carlin Swift MD       History of Present Illness:    Mr. Hamlet Terrell is a 54 y.o. male with chronic combined systolic and diastolic heart failure (EF 25%), afib on Coumadin, CAD, CVA, and history of PE who presents to the ED for evaluation of shortness of breath and edema.  He was just admitted to Hospital Medicine from 11/24-12/8 for a CHF exacerbation.  During that admission, he was evaluated by Cardiology who started him on Dobutamine and Diuril.  He was resistant to HTS evaluation during that stay, which included recommendations for LVAD, transplant, and ICD.  He was discharged home on Lasix 80mg PO BID.  He endorses compliance with his diuretic regimen, but despite this and following a low salt diet, he has continued to gain weight, have lower extremity and abdominal swelling, as well as shortness of breath.  He is so short of breath that he has to sleep in a chair, and has been unable to sleep because of his breathing.  He denies any chest pain.  He does have a cough that is mostly dry, but occasionally productive of blood tinged sputum.  He was discharged weight a weight of 259lbs, and claims that is around his dry weight.  He is currently 286lbs.      Upon arrival to the ED, vitals were /83, HR 88, RR 22.  Labs showed Hemoglobin 7.3, INR 1.5, Creatinine 1.8, Alk Phos 278,  with Trop 0.128.  CXR showed cardiomegaly and pulmonary edema, along with a masslike opacity.  CT chest was attempted to be ordered to evaluate the mass, but he was unable to lie flat to get it evaluated.  He was given Lasix 80mg IV and was admitted to Hospital Medicine for further management.      Review of  Systems:  Constitutional: Negative for chills, fatigue, fever.   HENT: Negative for sore throat, trouble swallowing.    Eyes: Negative for photophobia, visual disturbance.   Respiratory: + cough, shortness of breath.    Cardiovascular: + leg swelling.   Gastrointestinal: Negative for abdominal pain, nausea, vomiting, diarrhea, constipation  Endocrine: Negative for cold intolerance, heat intolerance.   Genitourinary: Negative for dysuria, frequency.   Musculoskeletal: Negative for arthralgias, myalgias.   Skin: Negative for rash, wound, erythema   Neurological: Negative for dizziness, syncope, weakness, light-headedness.   Psychiatric/Behavioral: Negative for confusion, hallucinations, anxiety  All other systems reviewed and are negative.      Past Medical History: Patient has a past medical history of Anticoagulant long-term use, Cardiomegaly, Chronic combined systolic and diastolic congestive heart failure, Coronary artery disease, Heart attack (2006), Hypertension, Hyperthyroidism, subclinical (1/2/2013), MI (myocardial infarction) (9/22/2013), Paroxysmal atrial fibrillation, PE (pulmonary embolism) (1/1/2013), S/P ablation of atrial flutter (2008), and Stroke (2009).      Past Surgical History: Patient has a past surgical history that includes Radiofrequency ablation (01/08/2008); Esophagogastroduodenoscopy (N/A, 12/2/2019); and Colonoscopy (N/A, 12/2/2019).      Social History: Patient reports that he has never smoked. He has never used smokeless tobacco. He reports that he does not drink alcohol or use drugs.      Family History: Patient's family history includes Alcohol abuse in his father; Hypertension in his brother, father, mother, and sister; Stroke in his mother.      Medications: Scheduled Meds:   [START ON 12/25/2019] aspirin  81 mg Oral Daily    [START ON 12/25/2019] ferrous sulfate  325 mg Oral Daily    isosorbide-hydrALAZINE 20-37.5 mg  1 tablet Oral BID    metoprolol tartrate  25 mg Oral BID     [START ON 12/25/2019] warfarin  7 mg Oral Daily     Continuous Infusions:   furosemide (LASIX) 2 mg/mL infusion (non-titrating)       PRN Meds:.acetaminophen, dextrose 50%, dextrose 50%, glucagon (human recombinant), glucose, glucose, HYDROcodone-acetaminophen, HYDROcodone-acetaminophen, melatonin, ondansetron, promethazine (PHENERGAN) IVPB, senna-docusate 8.6-50 mg, sodium chloride 0.9%, sodium chloride 0.9%      Allergies: Patient is allergic to acetaminophen; oxycodone-acetaminophen; and ace inhibitors.      Physical Exam:    Temp:  [97.7 °F (36.5 °C)]   Pulse:  []   Resp:  [14-22]   BP: (119-154)/(66-83)   SpO2:  [95 %-100 %]     Constitutional: Appears well developed and well nourished  Head: Normocephalic and atraumatic.   Mouth/Throat: Oropharynx is clear and moist.   Eyes: EOM are normal. Pupils are equal, round, and reactive to light. No scleral icterus.   Neck: Normal range of motion. Neck supple. JVD  Cardiovascular: Normal heart rate.  Regular heart rhythm.  No murmur heard.  Pulmonary/Chest: Effort normal. No respiratory distress. Rales throughout  Abdominal: Soft. Bowel sounds are normal.  Distension.  No tenderness  Musculoskeletal: Normal range of motion. 3+ BLE edema.   Neurological: Alert and oriented to person, place, and time.   Skin: Skin is warm and dry.   Psychiatric: Normal mood and affect. Behavior is normal.       No intake or output data in the 24 hours ending 12/24/19 2222  Recent Labs   Lab 12/24/19 1855 12/24/19 1857   WBC 7.46  --    HGB 7.3*  --    HCT 26.4* 25*     --      Recent Labs   Lab 12/24/19 1855      K 4.0      CO2 27   BUN 78*   CREATININE 1.8*   *   CALCIUM 9.7     Recent Labs   Lab 12/24/19 1855   ALKPHOS 278*   ALT 23   AST 36   ALBUMIN 3.3*   PROT 8.6*   BILITOT 1.4*   INR 1.5*      No results for input(s): LACTATE in the last 72 hours.   Recent Labs     12/24/19 1855   TROPONINI 0.128*         Assessment and Plan:    Mr. Hamlet MAYA  Xander is a 54 y.o. male who presented to [facility] on 12/24/2019 with a CHF exacerbation.    Acute on Chronic Combined Systolic and Diastolic Heart Failure  Cardiomyopathy  · CHF Pathway initiated  · Last 2D echo Nov 2019 with EF 25%  ·  and CXR with pulmonary edema and cardiomegaly  · IV diuresis with Lasix 10gtt and monitor response.  Goal diuresis 2-3L/day.  Home diuretic dose:  Lasix 80mg PO BID  · BID BMP (with morning labs and at 4pm)  · Strict I&Os with daily weights.  Dry weight 250lbs.  Admit weight 286lbs.  · Cardiology co-management consulted, appreciate assistance    Chronic AFib  Long Term Use of Anticoagulants  Subtherapeutic INR  · Chronic issue  · Continue Metoprolol 25mg PO BID  · Continue Warfarin - Pharmacy consulted for dosing management    Essential HTN  · Chronic and stable  · Continue Bidil BID  · Continue Metoprolol 25mg PO BID    CAD  History of MI  · Chronic and stable  · Continue Aspirin 81mg PO daily  · Continue Bidil BID  · Continue Metoprolol 25mg PO BID    Anemia  · Hgb 7.3 on admit  · Received 5 units PRBCs on prior hospital admission  · Continue PO iron  · Type and screen obtained  · Monitor for bleeding/need for transfusion    History of VTE  History of Stroke  · Chronic and stable  · Continue Aspirin 81mg PO daily  · Continue Warfarin as above    Stage 3 CKD  · Chronic and stable  · Monitor with diuresis    Elevated Alk Phos  · Chronic and stable  · Monitor    Benign Hypertensive Heart and Kidney Disease with HF and CKD  · As above    Abnormal CT  · Need CT chest when able to lie flat or outpatient to evaluate masslike opacity seen on CXR      Diet:  Low Sodium, 1.5L fluid restriction  VTE PPx:  Warfarin  Goals of Care:  Full      High Risk Conditions:   Patient is currently on drug therapy requiring intensive monitoring for toxicity: Lasix gtt       Disposition:  Pending Cardiology recs and diuresis  Discharge Needs:  TBD      Antoinette López MD  Hospital Medicine  Cell:   [phone]  Spectra:  [phone]

## 2019-12-25 NOTE — ED NOTES
Report received from IDANA Hanson. Care assumed. Pt resting comfortably and independently repositioned in stretcher with bed locked in lowest position for safety. Respirations even and unlabored and visible chest rise noted. Patient offered bathroom assistance and denies need at this time. Pt using urinal at bedside. Pt instructed to call if assistance is needed. No needs at this time. Side rails raised x2. Call light within reach.

## 2019-12-25 NOTE — ED NOTES
Patient identifiers verified and correct for Hamlet Terrell.    Patient presents to the ED with shortness of breath. Hx of CHF.     LOC: The patient is awake, alert and aware of environment with an appropriate affect, the patient is oriented x 3 and speaking appropriately.  APPEARANCE: Patient resting comfortably and in no acute distress, patient is clean and well groomed, patient's clothing is properly fastened.  SKIN: The skin is warm and dry, color consistent with ethnicity, skin intact, no breakdown or bruising noted. Skin on lower extremities is dry and rough.   MUSCULOSKELETAL: Patient moving all extremities spontaneously. Pt reports shortness of breath   RESPIRATORY: Airway is open and patent, respirations are spontaneous.  CARDIAC: Patient has A-fib. Bilateral lower extremity swelling noted. +4 pitting. capillary refill > 3 seconds.   ABDOMEN: Soft and non tender to palpation.

## 2019-12-26 LAB
ALBUMIN SERPL BCP-MCNC: 3.3 G/DL (ref 3.5–5.2)
ALP SERPL-CCNC: 258 U/L (ref 55–135)
ALT SERPL W/O P-5'-P-CCNC: 22 U/L (ref 10–44)
ANION GAP SERPL CALC-SCNC: 12 MMOL/L (ref 8–16)
ANION GAP SERPL CALC-SCNC: 15 MMOL/L (ref 8–16)
AST SERPL-CCNC: 30 U/L (ref 10–40)
BASOPHILS # BLD AUTO: 0.04 K/UL (ref 0–0.2)
BASOPHILS NFR BLD: 0.6 % (ref 0–1.9)
BILIRUB SERPL-MCNC: 1.6 MG/DL (ref 0.1–1)
BUN SERPL-MCNC: 86 MG/DL (ref 6–20)
BUN SERPL-MCNC: 87 MG/DL (ref 6–20)
CALCIUM SERPL-MCNC: 9.4 MG/DL (ref 8.7–10.5)
CALCIUM SERPL-MCNC: 9.8 MG/DL (ref 8.7–10.5)
CHLORIDE SERPL-SCNC: 102 MMOL/L (ref 95–110)
CHLORIDE SERPL-SCNC: 102 MMOL/L (ref 95–110)
CO2 SERPL-SCNC: 25 MMOL/L (ref 23–29)
CO2 SERPL-SCNC: 27 MMOL/L (ref 23–29)
CREAT SERPL-MCNC: 2.2 MG/DL (ref 0.5–1.4)
CREAT SERPL-MCNC: 2.3 MG/DL (ref 0.5–1.4)
DIFFERENTIAL METHOD: ABNORMAL
EOSINOPHIL # BLD AUTO: 0.6 K/UL (ref 0–0.5)
EOSINOPHIL NFR BLD: 10.3 % (ref 0–8)
ERYTHROCYTE [DISTWIDTH] IN BLOOD BY AUTOMATED COUNT: 25 % (ref 11.5–14.5)
EST. GFR  (AFRICAN AMERICAN): 36.1 ML/MIN/1.73 M^2
EST. GFR  (AFRICAN AMERICAN): 38.1 ML/MIN/1.73 M^2
EST. GFR  (NON AFRICAN AMERICAN): 31.3 ML/MIN/1.73 M^2
EST. GFR  (NON AFRICAN AMERICAN): 33 ML/MIN/1.73 M^2
GLUCOSE SERPL-MCNC: 140 MG/DL (ref 70–110)
GLUCOSE SERPL-MCNC: 97 MG/DL (ref 70–110)
HCT VFR BLD AUTO: 25.3 % (ref 40–54)
HGB BLD-MCNC: 7.1 G/DL (ref 14–18)
IMM GRANULOCYTES # BLD AUTO: 0.02 K/UL (ref 0–0.04)
IMM GRANULOCYTES NFR BLD AUTO: 0.3 % (ref 0–0.5)
INR PPP: 1.5 (ref 0.8–1.2)
INR PPP: 1.5 (ref 0.8–1.2)
LYMPHOCYTES # BLD AUTO: 0.7 K/UL (ref 1–4.8)
LYMPHOCYTES NFR BLD: 10.5 % (ref 18–48)
MAGNESIUM SERPL-MCNC: 2.3 MG/DL (ref 1.6–2.6)
MCH RBC QN AUTO: 24.3 PG (ref 27–31)
MCHC RBC AUTO-ENTMCNC: 28.1 G/DL (ref 32–36)
MCV RBC AUTO: 87 FL (ref 82–98)
MONOCYTES # BLD AUTO: 1.4 K/UL (ref 0.3–1)
MONOCYTES NFR BLD: 21.7 % (ref 4–15)
NEUTROPHILS # BLD AUTO: 3.5 K/UL (ref 1.8–7.7)
NEUTROPHILS NFR BLD: 56.6 % (ref 38–73)
NRBC BLD-RTO: 0 /100 WBC
PHOSPHATE SERPL-MCNC: 4.3 MG/DL (ref 2.7–4.5)
PLATELET # BLD AUTO: 244 K/UL (ref 150–350)
PMV BLD AUTO: 10.3 FL (ref 9.2–12.9)
POTASSIUM SERPL-SCNC: 4.5 MMOL/L (ref 3.5–5.1)
POTASSIUM SERPL-SCNC: 4.6 MMOL/L (ref 3.5–5.1)
PROT SERPL-MCNC: 8.4 G/DL (ref 6–8.4)
PROTHROMBIN TIME: 15 SEC (ref 9–12.5)
PROTHROMBIN TIME: 15 SEC (ref 9–12.5)
RBC # BLD AUTO: 2.92 M/UL (ref 4.6–6.2)
SODIUM SERPL-SCNC: 141 MMOL/L (ref 136–145)
SODIUM SERPL-SCNC: 142 MMOL/L (ref 136–145)
WBC # BLD AUTO: 6.22 K/UL (ref 3.9–12.7)

## 2019-12-26 PROCEDURE — 83735 ASSAY OF MAGNESIUM: CPT

## 2019-12-26 PROCEDURE — 99233 SBSQ HOSP IP/OBS HIGH 50: CPT | Mod: ,,, | Performed by: HOSPITALIST

## 2019-12-26 PROCEDURE — 63600175 PHARM REV CODE 636 W HCPCS: Performed by: HOSPITALIST

## 2019-12-26 PROCEDURE — 85025 COMPLETE CBC W/AUTO DIFF WBC: CPT

## 2019-12-26 PROCEDURE — 25000003 PHARM REV CODE 250: Performed by: FAMILY MEDICINE

## 2019-12-26 PROCEDURE — 85610 PROTHROMBIN TIME: CPT

## 2019-12-26 PROCEDURE — 36415 COLL VENOUS BLD VENIPUNCTURE: CPT

## 2019-12-26 PROCEDURE — 84100 ASSAY OF PHOSPHORUS: CPT

## 2019-12-26 PROCEDURE — 25000003 PHARM REV CODE 250: Performed by: HOSPITALIST

## 2019-12-26 PROCEDURE — 99233 PR SUBSEQUENT HOSPITAL CARE,LEVL III: ICD-10-PCS | Mod: ,,, | Performed by: HOSPITALIST

## 2019-12-26 PROCEDURE — 20600001 HC STEP DOWN PRIVATE ROOM

## 2019-12-26 PROCEDURE — 80048 BASIC METABOLIC PNL TOTAL CA: CPT

## 2019-12-26 PROCEDURE — 80053 COMPREHEN METABOLIC PANEL: CPT

## 2019-12-26 RX ORDER — METOPROLOL TARTRATE 25 MG/1
25 TABLET, FILM COATED ORAL 2 TIMES DAILY
Status: DISCONTINUED | OUTPATIENT
Start: 2019-12-26 | End: 2019-12-27

## 2019-12-26 RX ORDER — FUROSEMIDE 10 MG/ML
80 INJECTION INTRAMUSCULAR; INTRAVENOUS
Status: COMPLETED | OUTPATIENT
Start: 2019-12-26 | End: 2019-12-26

## 2019-12-26 RX ADMIN — FUROSEMIDE 80 MG: 10 INJECTION, SOLUTION INTRAMUSCULAR; INTRAVENOUS at 10:12

## 2019-12-26 RX ADMIN — METOPROLOL TARTRATE 25 MG: 25 TABLET ORAL at 10:12

## 2019-12-26 RX ADMIN — DOBUTAMINE IN DEXTROSE 2.5 MCG/KG/MIN: 200 INJECTION, SOLUTION INTRAVENOUS at 11:12

## 2019-12-26 RX ADMIN — FUROSEMIDE 15 MG/HR: 10 INJECTION, SOLUTION INTRAMUSCULAR; INTRAVENOUS at 09:12

## 2019-12-26 RX ADMIN — BACLOFEN 10 MG: 10 TABLET ORAL at 10:12

## 2019-12-26 RX ADMIN — ASPIRIN 81 MG: 81 TABLET, COATED ORAL at 09:12

## 2019-12-26 RX ADMIN — WARFARIN SODIUM 8 MG: 3 TABLET ORAL at 04:12

## 2019-12-26 RX ADMIN — METOPROLOL TARTRATE 25 MG: 25 TABLET ORAL at 09:12

## 2019-12-26 RX ADMIN — HYDRALAZINE HYDROCHLORIDE AND ISOSORBIDE DINITRATE 1 TABLET: 37.5; 2 TABLET, FILM COATED ORAL at 09:12

## 2019-12-26 RX ADMIN — HYDROCODONE BITARTRATE AND ACETAMINOPHEN 1 TABLET: 10; 325 TABLET ORAL at 09:12

## 2019-12-26 RX ADMIN — HYDROCODONE BITARTRATE AND ACETAMINOPHEN 1 TABLET: 5; 325 TABLET ORAL at 04:12

## 2019-12-26 RX ADMIN — FERROUS SULFATE TAB EC 325 MG (65 MG FE EQUIVALENT) 325 MG: 325 (65 FE) TABLET DELAYED RESPONSE at 09:12

## 2019-12-26 RX ADMIN — ONDANSETRON 8 MG: 2 INJECTION INTRAMUSCULAR; INTRAVENOUS at 11:12

## 2019-12-26 RX ADMIN — FUROSEMIDE 20 MG/HR: 10 INJECTION, SOLUTION INTRAMUSCULAR; INTRAVENOUS at 09:12

## 2019-12-26 NOTE — PROGRESS NOTES
Progress Note  Hospital Medicine    Provider team:    [facility] HOSP MED C  [facility] CARDIOLOGY TEAM A - CARDIOLOGY CONSULT STEPDOWN  Admit Date: 12/24/2019  Encounter Date: 12/26/2019     SUBJECTIVE:     Follow-up Visit for: Acute on chronic combined systolic and diastolic congestive heart failure    HPI (See H&P for complete P,F,SHx):  54 y.o. male with chronic combined systolic and diastolic heart failure (EF 25%), afib on Coumadin, CAD, CVA, and history of PE who presents to the ED for evaluation of shortness of breath and edema.  He was just admitted to Hospital Medicine from 11/24-12/8 for a CHF exacerbation.  During that admission, he was evaluated by Cardiology who started him on Dobutamine and Diuril.  He was resistant to HTS evaluation during that stay, which included recommendations for LVAD, transplant, and ICD.  He was discharged home on Lasix 80mg PO BID.  He endorses compliance with his diuretic regimen, but despite this and following a low salt diet, he has continued to gain weight, have lower extremity and abdominal swelling, as well as shortness of breath.  He is so short of breath that he has to sleep in a chair, and has been unable to sleep because of his breathing.  He denies any chest pain.  He does have a cough that is mostly dry, but occasionally productive of blood tinged sputum.  He was discharged weight a weight of 259lbs, and claims that is around his dry weight.  He is currently 286lbs.       Upon arrival to the ED, vitals were /83, HR 88, RR 22.  Labs showed Hemoglobin 7.3, INR 1.5, Creatinine 1.8, Alk Phos 278,  with Trop 0.128.  CXR showed cardiomegaly and pulmonary edema, along with a masslike opacity.  CT chest was attempted to be ordered to evaluate the mass, but he was unable to lie flat to get it evaluated.  He was given Lasix 80mg IV and was admitted to Hospital Medicine for further management.    TTE 11/27/2019  · Severely decreased left ventricular systolic function.   · The  estimated ejection fraction is 25%  · Concentric left ventricular hypertrophy.  · Global hypokinetic wall motion.  · Moderate right ventricular enlargement.  · Moderately reduced right ventricular systolic function.  · Severe biatrial enlargement.  · Moderate mitral regurgitation.  · Moderate tricuspid regurgitation.  · The estimated PA systolic pressure is 40 mm Hg  · Elevated central venous pressure (15 mm Hg).  · Atrial fibrillation observed.    Interval history:  Still reports shortness of breath. Pt finally agreeable to another PIV today.  gtt initiated after PIV placement. Increase Lasix to 20 mg/hr with one time bolus. Again reports disinterest in home dobutamine.       Review of Systems:  Review of Systems   Constitutional: Negative for chills and fever.   HENT: Negative for congestion and sore throat.    Eyes: Negative for photophobia, pain and discharge.   Respiratory: Positive for cough and shortness of breath. Negative for hemoptysis and sputum production.    Cardiovascular: Positive for orthopnea, leg swelling and PND. Negative for chest pain and palpitations.   Gastrointestinal: Negative for abdominal pain, diarrhea, nausea and vomiting.   Genitourinary: Negative for dysuria and urgency.   Musculoskeletal: Negative for myalgias and neck pain.   Skin: Negative for itching and rash.   Neurological: Negative for sensory change, focal weakness and headaches.   Endo/Heme/Allergies: Negative for polydipsia. Does not bruise/bleed easily.   Psychiatric/Behavioral: Negative for depression and suicidal ideas.     Past Medical History:   Diagnosis Date    Anticoagulant long-term use     Cardiomegaly     Chronic combined systolic and diastolic congestive heart failure     Coronary artery disease     Heart attack 2006    Hypertension     Hyperthyroidism, subclinical 1/2/2013    MI (myocardial infarction) 9/22/2013    MI in 2009      Paroxysmal atrial fibrillation     PE (pulmonary embolism) 1/1/2013     "IN 2010     S/P ablation of atrial flutter 2008    Stroke 2009    no residual weaknesses     Social History     Socioeconomic History    Marital status: Single     Spouse name: Not on file    Number of children: Not on file    Years of education: Not on file    Highest education level: Not on file   Occupational History    Not on file   Social Needs    Financial resource strain: Not on file    Food insecurity:     Worry: Not on file     Inability: Not on file    Transportation needs:     Medical: Not on file     Non-medical: Not on file   Tobacco Use    Smoking status: Never Smoker    Smokeless tobacco: Never Used   Substance and Sexual Activity    Alcohol use: No    Drug use: No    Sexual activity: Never   Lifestyle    Physical activity:     Days per week: Not on file     Minutes per session: Not on file    Stress: Not on file   Relationships    Social connections:     Talks on phone: Not on file     Gets together: Not on file     Attends [employer] service: Not on file     Active member of club or organization: Not on file     Attends meetings of clubs or organizations: Not on file     Relationship status: Not on file   Other Topics Concern    Not on file   Social History Narrative    Not on file       OBJECTIVE:       Intake/Output Summary (Last 24 hours) at 12/26/2019 1445  Last data filed at 12/26/2019 1139  Gross per 24 hour   Intake 1020 ml   Output 1575 ml   Net -555 ml     Vital Signs Range (Last 24H):  Temp:  [97.4 °F (36.3 °C)-98.4 °F (36.9 °C)]   Pulse:  []   Resp:  [16-20]   BP: ()/(53-73)   SpO2:  [93 %-99 %]   Body mass index is 41.9 kg/m².    Objective:  General Appearance:  Comfortable, well-appearing, in no acute distress and not in pain.    Vital signs: (most recent): Blood pressure (!) 130/59, pulse 81, temperature 97.9 °F (36.6 °C), temperature source Oral, resp. rate 18, height 5' 9" (1.753 m), weight 128.7 kg (283 lb 11.2 oz), SpO2 (!) 93 %.  No fever.    Output: " Producing urine and producing stool.    HEENT: Normal HEENT exam.  (+JVD)    Lungs:  Normal effort.  Breath sounds clear to auscultation.  He is not in respiratory distress.  There are rales.  No wheezes.    Heart: Normal rate.  Regular rhythm.  S1 normal and S2 normal.  Positive for murmur.    Chest: Symmetric chest wall expansion.   Abdomen: Abdomen is soft.  Bowel sounds are normal.   There is no abdominal tenderness.     Extremities: Normal range of motion.  There is venous stasis and dependent edema.  There is no deformity, effusion or local swelling.    Pulses: Distal pulses are intact.    Neurological: Patient is alert and oriented to person, place and time.  Normal strength.    Pupils:  Pupils are equal, round, and reactive to light.    Skin:  Warm and dry.      Medications:  Medication list was reviewed in EPIC and changes noted under Assessment/Plan and MAR.    Laboratory:  Recent Labs     12/26/19  0409   WBC 6.22   RBC 2.92*   HGB 7.1*   HCT 25.3*      MCV 87   MCH 24.3*   MCHC 28.1*   GRAN 56.6  3.5   LYMPH 10.5*  0.7*   MONO 21.7*  1.4*   EOS 0.6*      Recent Labs     12/26/19  0409   GLU 97      K 4.5      CO2 25   BUN 86*   CREATININE 2.2*   CALCIUM 9.8   ANIONGAP 15   MG 2.3   PHOS 4.3     Prior records reviewed.    ECHO reviewed:  · Severely decreased left ventricular systolic function. The estimated ejection fraction is 25%  · Concentric left ventricular hypertrophy.  · Global hypokinetic wall motion.  · Moderate right ventricular enlargement.  · Moderately reduced right ventricular systolic function.  · Severe biatrial enlargement.  · Moderate mitral regurgitation.  · Moderate tricuspid regurgitation.  · The estimated PA systolic pressure is 40 mm Hg  · Elevated central venous pressure (15 mm Hg).  · Atrial fibrillation observed.      Imaging reviewed  Imaging Results          CT Chest Without Contrast (No Result on File)                X-Ray Chest AP Portable (Final result)   Result time 12/24/19 19:09:07    Final result by Zeb Hirsch MD (12/24/19 19:09:07)                 Impression:      Masslike opacity overlying the right lower lung zone, similar to prior exam.  Right pleural fluid not excluded.  Chest CT recommended for further evaluation of right lung masslike opacity.    Patchy bilateral pulmonary opacities, similar to prior exam.    Cardiomegaly.    Electronically signed by resident: Cristofer Reddy  Date:    12/24/2019  Time:    18:48    Electronically signed by: Zeb Hirsch MD  Date:    12/24/2019  Time:    19:09             Narrative:    EXAMINATION:  XR CHEST AP PORTABLE    CLINICAL HISTORY:  CHF;    TECHNIQUE:  Single frontal view of the chest was performed.    COMPARISON:  Chest radiograph 12/03/2019    FINDINGS:  Cardiac leads overlie the chest wall.    Redemonstration of extensive bilateral patchy opacities, more prominent on the right with a focal area of masslike opacity in the right lower lung zone.  No significant change from prior exam.  Left costophrenic angle is visible and right sided pleural fluid not excluded noting presence of opacification.  No pneumothorax.    The cardiac silhouette is enlarged.  Hilar and mediastinal contours are unchanged.    Bones are intact.                                  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 [I13.0]  Yes    Elevated alkaline phosphatase level [R74.8]  Yes    Coronary artery disease [I25.10]  Yes     Chronic    Stage 3 chronic kidney disease [N18.3]  Yes    Subtherapeutic international normalized ratio (INR) [R79.1]  Yes    Personal history of cerebral embolism [Z86.79]  Not Applicable    Personal history of venous thrombosis and embolism [Z86.718]  Not Applicable    Personal history of MI (myocardial infarction) [I25.2]  Not  Applicable    Essential hypertension [I10]  Yes     Chronic    Atrial fibrillation, chronic [I48.20]  Yes     Chronic    Chronic anticoagulation [Z79.01]  Not Applicable     Chronic    Cardiomyopathy [I42.9]  Yes     Chronic      Resolved Hospital Problems   No resolved problems to display.      Acute on Chronic Combined Systolic and Diastolic Heart Failure  Cardiomyopathy  · CHF Pathway initiated  · Last 2D echo Nov 2019 with EF 25%  ·  and CXR with pulmonary edema and cardiomegaly  · IV diuresis increased to Lasix 20 gtt.  2.5 initiated. Pt continues to report disinterest in home dobutamine   · Goal diuresis 2-3L/day.  Home diuretic dose:  Lasix 80mg PO BID  · BID BMP (with morning labs and at 4pm)  · Strict I&Os with daily weights.  Dry weight 232 lb?  Admit weight 286lbs.  · Stop BB, okay to c/w BiDil     Chronic AFib  Long Term Use of Anticoagulants  Subtherapeutic INR  · Chronic issue  · HR in 100-120's. Resume BB  · Continue Warfarin - Pharmacy consulted for dosing management     Essential HTN  · Chronic and stable  · Continue Bidil BID  · Resume BB     CAD  History of MI  · Chronic and stable  · Continue Aspirin 81mg PO daily  · Continue Bidil BID  · Resume BB     Anemia  · Hgb 7.3 on admit; stable  · Received 5 units PRBCs on prior hospital admission  · Continue PO iron  · Type and screen obtained  · Monitor for bleeding/need for transfusion     History of VTE  History of Stroke  · Chronic and stable  · Continue Aspirin 81mg PO daily  · Continue Warfarin as above     Stage 3 CKD  · Chronic and stable  · Monitor with diuresis     Elevated Alk Phos  · Chronic and stable  · Monitor     Benign Hypertensive Heart and Kidney Disease with HF and CKD  · As above     Abnormal CT  · Needs CT chest when able to lie flat or outpt to evaluate masslike opacity seen on CXR        Diet:  Low Sodium, 1.5L fluid restriction  VTE PPx:  Warfarin  Goals of Care:  Full     High Risk Conditions:   Patient is  currently on drug therapy requiring intensive monitoring for toxicity: Lasix gtt     Anticipated discharge date and disposition:   Pending diuresis    Antoinette Goins MD  [facility]

## 2019-12-26 NOTE — NURSING
Patient had 15 and 9 beat run of asymopmatic VTach with etopic on tele monitor. MD Marcela notified. Labs are pending. Will cont to monitor patient.

## 2019-12-26 NOTE — NURSING
"Patient called DIANA Aranda to room and stated that he attempt having IV started. Patient stated to "hurry up before I change my mind." Patient only allowed right arm to be stuck even though patient told left arm had more better options. Patient still refused arm to be stuck. On first attempt, IV blown and patient refused another attempt. Order place for PICC consult for IV or midline.     CT called for patient to ask if patient would try to attempt. Patient asked if he would attempt CT scan and educated that he would have to lay flat flat for test. Patient asking for chocolate pudding instead. Pt told that he could have pudding on return from CT scan. Patient agreed to go to  CT scan.     CT tech called and stated that patient refused CT scan. Per CT tech patient stated that he could not tolerate lying flat. MD Marcela notified. Will cont to monitor patient.       "

## 2019-12-26 NOTE — PLAN OF CARE
Patient remains free from falls and injuries through out shift. Patient AAOx4. VSS. Patient is in rate controlled a-fib on tele monitor('s). Patient denies chest pain. PRN 1-1/.5L of NC in place for pt comfort with O2 sats greater than 95%. Lasix gtt increases to 15mg infusing at 7.5mL/h per MAR. UOP poor on shift. Last K 4.4, Mg 2.3, BUN/SCR 80/2.1 INR 1.6, Trop 0.128 and H/H 7.2/25. Per day shift RN yesterday in report patient refused IV placement for  gtt to be initiated and MD Anita is aware. Education on importance of  therapy given on shift, but unsure if patient fully understands. Coumadin 7mg given yesterday evening during day shift. POC is to diuresis patient. Plan of care reviewed with patient. Patient verbalizes understanding of plan.  Will continue to monitor.

## 2019-12-26 NOTE — PLAN OF CARE
CM completed d/c assessment with pt at bedside. Provided DELMI and POC, pt verbalized understanding. CM updated white board in pt's room and nurse.    CM will follow-up and assist team with d/c needs.    Sofi De Leon RN  [id number]       12/26/19 7906   Discharge Assessment   Assessment Type Discharge Planning Assessment   Confirmed/corrected address and phone number on facesheet? Yes   Assessment information obtained from? Patient;Medical Record   Expected Length of Stay (days) 8   Communicated expected length of stay with patient/caregiver yes   Prior to hospitilization cognitive status: Alert/Oriented   Prior to hospitalization functional status: Independent   Current cognitive status: Alert/Oriented   Current Functional Status: Needs Assistance   Lives With alone   Able to Return to Prior Arrangements yes   Is patient able to care for self after discharge? Unable to determine at this time (comments)  (TBD)   Patient's perception of discharge disposition home or selfcare   Readmission Within the Last 30 Days previous discharge plan unsuccessful   If yes, most recent facility name: [facility]   Patient currently being followed by outpatient case management? No   Patient currently receives any other outside agency services? No   Equipment Currently Used at Home none   Do you have any problems affording any of your prescribed medications? No   Is the patient taking medications as prescribed? yes   Does the patient have transportation home? Yes   Transportation Anticipated family or friend will provide  (Brother)   Does the patient receive services at the [facility]? Yes   Discharge Plan A Home   DME Needed Upon Discharge  other (see comments)  (TBD)   Patient/Family in Agreement with Plan yes   Readmission Questionnaire   Living Arrangements house  (with 3 stairs to enter)

## 2019-12-27 LAB
ALBUMIN SERPL BCP-MCNC: 3.4 G/DL (ref 3.5–5.2)
ALP SERPL-CCNC: 286 U/L (ref 55–135)
ALT SERPL W/O P-5'-P-CCNC: 21 U/L (ref 10–44)
ANION GAP SERPL CALC-SCNC: 12 MMOL/L (ref 8–16)
ANION GAP SERPL CALC-SCNC: 12 MMOL/L (ref 8–16)
AST SERPL-CCNC: 25 U/L (ref 10–40)
BASOPHILS # BLD AUTO: 0.05 K/UL (ref 0–0.2)
BASOPHILS NFR BLD: 0.8 % (ref 0–1.9)
BILIRUB SERPL-MCNC: 1.4 MG/DL (ref 0.1–1)
BUN SERPL-MCNC: 89 MG/DL (ref 6–20)
BUN SERPL-MCNC: 90 MG/DL (ref 6–20)
CALCIUM SERPL-MCNC: 9.5 MG/DL (ref 8.7–10.5)
CALCIUM SERPL-MCNC: 9.6 MG/DL (ref 8.7–10.5)
CHLORIDE SERPL-SCNC: 100 MMOL/L (ref 95–110)
CHLORIDE SERPL-SCNC: 99 MMOL/L (ref 95–110)
CO2 SERPL-SCNC: 26 MMOL/L (ref 23–29)
CO2 SERPL-SCNC: 27 MMOL/L (ref 23–29)
CREAT SERPL-MCNC: 2.7 MG/DL (ref 0.5–1.4)
CREAT SERPL-MCNC: 3 MG/DL (ref 0.5–1.4)
DIFFERENTIAL METHOD: ABNORMAL
EOSINOPHIL # BLD AUTO: 0.5 K/UL (ref 0–0.5)
EOSINOPHIL NFR BLD: 8 % (ref 0–8)
ERYTHROCYTE [DISTWIDTH] IN BLOOD BY AUTOMATED COUNT: 24.7 % (ref 11.5–14.5)
EST. GFR  (AFRICAN AMERICAN): 26.2 ML/MIN/1.73 M^2
EST. GFR  (AFRICAN AMERICAN): 29.8 ML/MIN/1.73 M^2
EST. GFR  (NON AFRICAN AMERICAN): 22.7 ML/MIN/1.73 M^2
EST. GFR  (NON AFRICAN AMERICAN): 25.7 ML/MIN/1.73 M^2
GLUCOSE SERPL-MCNC: 101 MG/DL (ref 70–110)
GLUCOSE SERPL-MCNC: 133 MG/DL (ref 70–110)
HCT VFR BLD AUTO: 27.1 % (ref 40–54)
HGB BLD-MCNC: 7.5 G/DL (ref 14–18)
IMM GRANULOCYTES # BLD AUTO: 0.01 K/UL (ref 0–0.04)
IMM GRANULOCYTES NFR BLD AUTO: 0.2 % (ref 0–0.5)
INR PPP: 1.7 (ref 0.8–1.2)
LYMPHOCYTES # BLD AUTO: 0.6 K/UL (ref 1–4.8)
LYMPHOCYTES NFR BLD: 10.1 % (ref 18–48)
MAGNESIUM SERPL-MCNC: 2.5 MG/DL (ref 1.6–2.6)
MCH RBC QN AUTO: 24 PG (ref 27–31)
MCHC RBC AUTO-ENTMCNC: 27.7 G/DL (ref 32–36)
MCV RBC AUTO: 87 FL (ref 82–98)
MONOCYTES # BLD AUTO: 1.3 K/UL (ref 0.3–1)
MONOCYTES NFR BLD: 20.2 % (ref 4–15)
NEUTROPHILS # BLD AUTO: 3.8 K/UL (ref 1.8–7.7)
NEUTROPHILS NFR BLD: 60.7 % (ref 38–73)
NRBC BLD-RTO: 0 /100 WBC
PHOSPHATE SERPL-MCNC: 4.6 MG/DL (ref 2.7–4.5)
PLATELET # BLD AUTO: 239 K/UL (ref 150–350)
PMV BLD AUTO: 9.6 FL (ref 9.2–12.9)
POTASSIUM SERPL-SCNC: 4.8 MMOL/L (ref 3.5–5.1)
POTASSIUM SERPL-SCNC: 5 MMOL/L (ref 3.5–5.1)
PROT SERPL-MCNC: 8.7 G/DL (ref 6–8.4)
PROTHROMBIN TIME: 16.6 SEC (ref 9–12.5)
RBC # BLD AUTO: 3.12 M/UL (ref 4.6–6.2)
SODIUM SERPL-SCNC: 138 MMOL/L (ref 136–145)
SODIUM SERPL-SCNC: 138 MMOL/L (ref 136–145)
WBC # BLD AUTO: 6.23 K/UL (ref 3.9–12.7)

## 2019-12-27 PROCEDURE — 85025 COMPLETE CBC W/AUTO DIFF WBC: CPT

## 2019-12-27 PROCEDURE — 83735 ASSAY OF MAGNESIUM: CPT

## 2019-12-27 PROCEDURE — 84100 ASSAY OF PHOSPHORUS: CPT

## 2019-12-27 PROCEDURE — 63600175 PHARM REV CODE 636 W HCPCS: Performed by: HOSPITALIST

## 2019-12-27 PROCEDURE — 25000003 PHARM REV CODE 250: Performed by: HOSPITALIST

## 2019-12-27 PROCEDURE — 99233 SBSQ HOSP IP/OBS HIGH 50: CPT | Mod: ,,, | Performed by: HOSPITALIST

## 2019-12-27 PROCEDURE — 36415 COLL VENOUS BLD VENIPUNCTURE: CPT

## 2019-12-27 PROCEDURE — 99233 PR SUBSEQUENT HOSPITAL CARE,LEVL III: ICD-10-PCS | Mod: ,,, | Performed by: HOSPITALIST

## 2019-12-27 PROCEDURE — 80048 BASIC METABOLIC PNL TOTAL CA: CPT

## 2019-12-27 PROCEDURE — 80053 COMPREHEN METABOLIC PANEL: CPT

## 2019-12-27 PROCEDURE — 20600001 HC STEP DOWN PRIVATE ROOM

## 2019-12-27 PROCEDURE — 85610 PROTHROMBIN TIME: CPT

## 2019-12-27 RX ORDER — MORPHINE SULFATE 2 MG/ML
1 INJECTION, SOLUTION INTRAMUSCULAR; INTRAVENOUS EVERY 6 HOURS PRN
Status: DISCONTINUED | OUTPATIENT
Start: 2019-12-27 | End: 2020-01-02

## 2019-12-27 RX ADMIN — HYDROCODONE BITARTRATE AND ACETAMINOPHEN 1 TABLET: 10; 325 TABLET ORAL at 09:12

## 2019-12-27 RX ADMIN — WARFARIN SODIUM 8 MG: 3 TABLET ORAL at 04:12

## 2019-12-27 RX ADMIN — FERROUS SULFATE TAB EC 325 MG (65 MG FE EQUIVALENT) 325 MG: 325 (65 FE) TABLET DELAYED RESPONSE at 09:12

## 2019-12-27 RX ADMIN — METOPROLOL TARTRATE 25 MG: 25 TABLET ORAL at 09:12

## 2019-12-27 RX ADMIN — HYDRALAZINE HYDROCHLORIDE AND ISOSORBIDE DINITRATE 1 TABLET: 37.5; 2 TABLET, FILM COATED ORAL at 09:12

## 2019-12-27 RX ADMIN — ASPIRIN 81 MG: 81 TABLET, COATED ORAL at 09:12

## 2019-12-27 RX ADMIN — FUROSEMIDE 20 MG/HR: 10 INJECTION, SOLUTION INTRAMUSCULAR; INTRAVENOUS at 09:12

## 2019-12-27 RX ADMIN — MORPHINE SULFATE 1 MG: 2 INJECTION, SOLUTION INTRAMUSCULAR; INTRAVENOUS at 11:12

## 2019-12-27 RX ADMIN — CHLOROTHIAZIDE SODIUM 500 MG: 500 INJECTION, POWDER, LYOPHILIZED, FOR SOLUTION INTRAVENOUS at 11:12

## 2019-12-27 RX ADMIN — ONDANSETRON 8 MG: 2 INJECTION INTRAMUSCULAR; INTRAVENOUS at 01:12

## 2019-12-27 NOTE — PLAN OF CARE
"Plan of care discussed with patient. Patient is free of fall/trauma/injury. Continuous lasix infusing at 10 ml/hr. PICC team consulted for peripheral IV; PIV inserted successfully and dobutamine initiated. Pt reported pain in left hand caused by IV; treated with prn norco. Pt stated that his reaction to acetaminophen is just minor itching and insisted that he receive the norco. Pt stated that he was going to "remove the IV himself if someone else does not remove it now." MD notified. Dobutamine paused and lasix continues to infuse in remaining IV. Pt refusing new IV. Pt re-educated on fluid restriction. All questions addressed. Will continue to monitor   "

## 2019-12-27 NOTE — PLAN OF CARE
PLAN IS TO CONTINUE DIURESE THE PT SO THAT CAT SCAN CAN BE OBTAINED NEXT WEEK     12/27/19 2878   Discharge Reassessment   Assessment Type Discharge Planning Reassessment   Provided patient/caregiver education on the expected discharge date and the discharge plan No   Do you have any problems affording any of your prescribed medications? No

## 2019-12-27 NOTE — PROGRESS NOTES
Progress Note  Hospital Medicine    Provider team:    [facility]  [facility] TEAM A - CARDIOLOGY CONSULT STEPDOWN  Admit Date: 12/24/2019  Encounter Date: 12/27/2019     SUBJECTIVE:     Follow-up Visit for: Acute on chronic combined systolic and diastolic congestive heart failure    HPI (See H&P for complete P,F,SHx):  54 y.o. male with chronic combined systolic and diastolic heart failure (EF 25%), afib on Coumadin, CAD, CVA, and history of PE who presents to the ED for evaluation of shortness of breath and edema.  He was just admitted to Hospital Medicine from 11/24-12/8 for a CHF exacerbation.  During that admission, he was evaluated by Cardiology who started him on Dobutamine and Diuril.  He was resistant to HTS evaluation during that stay, which included recommendations for LVAD, transplant, and ICD.  He was discharged home on Lasix 80mg PO BID.  He endorses compliance with his diuretic regimen, but despite this and following a low salt diet, he has continued to gain weight, have lower extremity and abdominal swelling, as well as shortness of breath.  He is so short of breath that he has to sleep in a chair, and has been unable to sleep because of his breathing.  He denies any chest pain.  He does have a cough that is mostly dry, but occasionally productive of blood tinged sputum.  He was discharged weight a weight of 259lbs, and claims that is around his dry weight.  He is currently 286lbs.       Upon arrival to the ED, vitals were /83, HR 88, RR 22.  Labs showed Hemoglobin 7.3, INR 1.5, Creatinine 1.8, Alk Phos 278,  with Trop 0.128.  CXR showed cardiomegaly and pulmonary edema, along with a masslike opacity.  CT chest was attempted to be ordered to evaluate the mass, but he was unable to lie flat to get it evaluated.  He was given Lasix 80mg IV and was admitted to Hospital Medicine for further management.    TTE 11/27/2019  · Severely decreased left ventricular systolic function.   · The  estimated ejection fraction is 25%  · Concentric left ventricular hypertrophy.  · Global hypokinetic wall motion.  · Moderate right ventricular enlargement.  · Moderately reduced right ventricular systolic function.  · Severe biatrial enlargement.  · Moderate mitral regurgitation.  · Moderate tricuspid regurgitation.  · The estimated PA systolic pressure is 40 mm Hg  · Elevated central venous pressure (15 mm Hg).  · Atrial fibrillation observed.    Interval history:  Still reports shortness of breath. C/O hand cramping, requesting morphine. Patient was verbally aggressive towards the cardiology fellow this morning. Patient requested PIV removed yesterday due to pain at site, so  gtt has been held since 530pm. Awaiting new IV placement. Only net negative 270 mL and is up one lb today. Adding Diuril 500 BID. Pt continues to refuse consideration of home dobutamine or palliative care.     Review of Systems:  Review of Systems   Constitutional: Negative for chills and fever.   HENT: Negative for congestion and sore throat.    Eyes: Negative for photophobia, pain and discharge.   Respiratory: Positive for shortness of breath. Negative for cough, hemoptysis and sputum production.    Cardiovascular: Positive for orthopnea, leg swelling and PND. Negative for chest pain and palpitations.   Gastrointestinal: Negative for abdominal pain, diarrhea, nausea and vomiting.   Genitourinary: Negative for dysuria and urgency.   Musculoskeletal: Negative for myalgias and neck pain.   Skin: Negative for itching and rash.   Neurological: Negative for sensory change, focal weakness and headaches.   Endo/Heme/Allergies: Negative for polydipsia. Does not bruise/bleed easily.   Psychiatric/Behavioral: Negative for depression and suicidal ideas.     Past Medical History:   Diagnosis Date    Anticoagulant long-term use     Cardiomegaly     Chronic combined systolic and diastolic congestive heart failure     Coronary artery disease      Heart attack 2006    Hypertension     Hyperthyroidism, subclinical 1/2/2013    MI (myocardial infarction) 9/22/2013    MI in 2009      Paroxysmal atrial fibrillation     PE (pulmonary embolism) 1/1/2013    IN 2010     S/P ablation of atrial flutter 2008    Stroke 2009    no residual weaknesses     Social History     Socioeconomic History    Marital status: Single     Spouse name: Not on file    Number of children: Not on file    Years of education: Not on file    Highest education level: Not on file   Occupational History    Not on file   Social Needs    Financial resource strain: Not on file    Food insecurity:     Worry: Not on file     Inability: Not on file    Transportation needs:     Medical: Not on file     Non-medical: Not on file   Tobacco Use    Smoking status: Never Smoker    Smokeless tobacco: Never Used   Substance and Sexual Activity    Alcohol use: No    Drug use: No    Sexual activity: Never   Lifestyle    Physical activity:     Days per week: Not on file     Minutes per session: Not on file    Stress: Not on file   Relationships    Social connections:     Talks on phone: Not on file     Gets together: Not on file     Attends Tenriism service: Not on file     Active member of club or organization: Not on file     Attends meetings of clubs or organizations: Not on file     Relationship status: Not on file   Other Topics Concern    Not on file   Social History Narrative    Not on file       OBJECTIVE:       Intake/Output Summary (Last 24 hours) at 12/27/2019 1213  Last data filed at 12/27/2019 0800  Gross per 24 hour   Intake 1130 ml   Output 800 ml   Net 330 ml     Vital Signs Range (Last 24H):  Temp:  [97.4 °F (36.3 °C)-98.1 °F (36.7 °C)]   Pulse:  []   Resp:  [16-18]   BP: ()/(57-85)   SpO2:  [90 %-97 %]   Body mass index is 42.06 kg/m².    Objective:  General Appearance:  Comfortable, well-appearing, in no acute distress and not in pain.    Vital signs: (most  "recent): Blood pressure 98/65, pulse 86, temperature 98.1 °F (36.7 °C), temperature source Oral, resp. rate 18, height 5' 9" (1.753 m), weight 129.2 kg (284 lb 13.4 oz), SpO2 (!) 90 %.  No fever.    Output: Producing urine and producing stool.    HEENT: Normal HEENT exam.  (+JVD)    Lungs:  Normal effort.  Breath sounds clear to auscultation.  He is not in respiratory distress.  There are rales.  No wheezes.    Heart: Normal rate.  Regular rhythm.  S1 normal and S2 normal.  Positive for murmur.    Chest: Symmetric chest wall expansion.   Abdomen: Abdomen is soft.  Bowel sounds are normal.   There is no abdominal tenderness.     Extremities: Normal range of motion.  There is venous stasis and dependent edema.  There is no deformity, effusion or local swelling.    Pulses: Distal pulses are intact.    Neurological: Patient is alert and oriented to person, place and time.  Normal strength.    Pupils:  Pupils are equal, round, and reactive to light.    Skin:  Warm and dry.      Medications:  Medication list was reviewed in EPIC and changes noted under Assessment/Plan and MAR.    Laboratory:  Recent Labs     12/27/19  0846   WBC 6.23   RBC 3.12*   HGB 7.5*   HCT 27.1*      MCV 87   MCH 24.0*   MCHC 27.7*   GRAN 60.7  3.8   LYMPH 10.1*  0.6*   MONO 20.2*  1.3*   EOS 0.5      Recent Labs     12/27/19  0846   *      K 4.8      CO2 26   BUN 89*   CREATININE 2.7*   CALCIUM 9.5   ANIONGAP 12   MG 2.5   PHOS 4.6*     Prior records reviewed.    ECHO reviewed:  · Severely decreased left ventricular systolic function. The estimated ejection fraction is 25%  · Concentric left ventricular hypertrophy.  · Global hypokinetic wall motion.  · Moderate right ventricular enlargement.  · Moderately reduced right ventricular systolic function.  · Severe biatrial enlargement.  · Moderate mitral regurgitation.  · Moderate tricuspid regurgitation.  · The estimated PA systolic pressure is 40 mm Hg  · Elevated central " venous pressure (15 mm Hg).  · Atrial fibrillation observed.      Imaging reviewed  Imaging Results          CT Chest Without Contrast (No Result on File)                X-Ray Chest AP Portable (Final result)  Result time 12/24/19 19:09:07    Final result by Zeb Hirsch MD (12/24/19 19:09:07)                 Impression:      Masslike opacity overlying the right lower lung zone, similar to prior exam.  Right pleural fluid not excluded.  Chest CT recommended for further evaluation of right lung masslike opacity.    Patchy bilateral pulmonary opacities, similar to prior exam.    Cardiomegaly.    Electronically signed by resident: Cristofer April  Date:    12/24/2019  Time:    18:48    Electronically signed by: Zeb Hirsch MD  Date:    12/24/2019  Time:    19:09             Narrative:    EXAMINATION:  XR CHEST AP PORTABLE    CLINICAL HISTORY:  CHF;    TECHNIQUE:  Single frontal view of the chest was performed.    COMPARISON:  Chest radiograph 12/03/2019    FINDINGS:  Cardiac leads overlie the chest wall.    Redemonstration of extensive bilateral patchy opacities, more prominent on the right with a focal area of masslike opacity in the right lower lung zone.  No significant change from prior exam.  Left costophrenic angle is visible and right sided pleural fluid not excluded noting presence of opacification.  No pneumothorax.    The cardiac silhouette is enlarged.  Hilar and mediastinal contours are unchanged.    Bones are intact.                                  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 [I13.0]  Yes    Elevated alkaline phosphatase level [R74.8]  Yes    Coronary artery disease [I25.10]  Yes     Chronic    Stage 3 chronic kidney disease [N18.3]  Yes    Subtherapeutic international normalized ratio (INR) [R79.1]  Yes     Personal history of cerebral embolism [Z86.79]  Not Applicable    Personal history of venous thrombosis and embolism [Z86.718]  Not Applicable    Personal history of MI (myocardial infarction) [I25.2]  Not Applicable    Essential hypertension [I10]  Yes     Chronic    Atrial fibrillation, chronic [I48.20]  Yes     Chronic    Chronic anticoagulation [Z79.01]  Not Applicable     Chronic    Cardiomyopathy [I42.9]  Yes     Chronic      Resolved Hospital Problems   No resolved problems to display.      Acute on Chronic Combined Systolic and Diastolic Heart Failure  Cardiomyopathy  · CHF Pathway initiated  · Last 2D echo Nov 2019 with EF 25%  ·  and CXR with pulmonary edema and cardiomegaly  · Cont Lasix 20 gtt.  2.5. Add Diuril 500 BID. Pt continues to report disinterest in home dobutamine or palliative care. Pt with extremely poor insight into his disease.  · Goal diuresis 2-3L/day.  Home diuretic dose:  Lasix 80mg PO BID  · BID BMP (with morning labs and at 4pm)  · Strict I&Os with daily weights.  Dry weight 232 lb?  Admit weight 286lbs.  · Hold BB, okay to c/w BiDil     Chronic AFib  Long Term Use of Anticoagulants  Subtherapeutic INR  · Chronic issue  · HR in 100-120's. Resume BB  · Continue Warfarin - Pharmacy consulted for dosing management     Essential HTN  · Chronic and stable  · Continue Bidil BID  · Resume BB     CAD  History of MI  · Chronic and stable  · Continue Aspirin 81mg PO daily  · Continue Bidil BID  · Resume BB     Anemia  · Hgb 7.3 on admit; stable  · Received 5 units PRBCs on prior hospital admission  · Continue PO iron  · Type and screen obtained  · Monitor for bleeding/need for transfusion     History of VTE  History of Stroke  · Chronic and stable  · Continue Aspirin 81mg PO daily  · Continue Warfarin as above     Stage 3 CKD  · Chronic and stable  · Monitor with diuresis     Elevated Alk Phos  · Chronic and stable  · Monitor     Benign Hypertensive Heart and Kidney Disease with  HF and CKD  · As above     Abnormal CT  · Needs CT chest when able to lie flat or outpt to evaluate masslike opacity seen on CXR        Diet:  Low Sodium, 1.5L fluid restriction  VTE PPx:  Warfarin  Goals of Care:  Full     High Risk Conditions:   Patient is currently on drug therapy requiring intensive monitoring for toxicity: Lasix gtt     Anticipated discharge date and disposition:   Pending diuresis    Antoinette Goins MD  [facility]

## 2019-12-27 NOTE — PLAN OF CARE
Complains of N/V during shift; PRN medications given. Complaints of pain treated with PRN meds. Lasix gtt continued per orders; UOP monitored closely. BLE +4 edema noted. VSS. Pt denies SOB or chest pain. Pt remain on telemetry; afib on telemetry. Fall precautions maintained. Pt free of falls and injuries. POC discussed with patient/family, verbalized understanding. Questions answered, no distress at present.

## 2019-12-28 LAB
ALBUMIN SERPL BCP-MCNC: 3.3 G/DL (ref 3.5–5.2)
ALP SERPL-CCNC: 287 U/L (ref 55–135)
ALT SERPL W/O P-5'-P-CCNC: 19 U/L (ref 10–44)
ANION GAP SERPL CALC-SCNC: 14 MMOL/L (ref 8–16)
AST SERPL-CCNC: 21 U/L (ref 10–40)
BASOPHILS # BLD AUTO: 0.05 K/UL (ref 0–0.2)
BASOPHILS NFR BLD: 0.9 % (ref 0–1.9)
BILIRUB SERPL-MCNC: 1.2 MG/DL (ref 0.1–1)
BUN SERPL-MCNC: 93 MG/DL (ref 6–20)
CALCIUM SERPL-MCNC: 9.5 MG/DL (ref 8.7–10.5)
CHLORIDE SERPL-SCNC: 98 MMOL/L (ref 95–110)
CO2 SERPL-SCNC: 27 MMOL/L (ref 23–29)
CREAT SERPL-MCNC: 3.1 MG/DL (ref 0.5–1.4)
DIFFERENTIAL METHOD: ABNORMAL
EOSINOPHIL # BLD AUTO: 0.4 K/UL (ref 0–0.5)
EOSINOPHIL NFR BLD: 6.1 % (ref 0–8)
ERYTHROCYTE [DISTWIDTH] IN BLOOD BY AUTOMATED COUNT: 24.4 % (ref 11.5–14.5)
EST. GFR  (AFRICAN AMERICAN): 25.2 ML/MIN/1.73 M^2
EST. GFR  (NON AFRICAN AMERICAN): 21.8 ML/MIN/1.73 M^2
GLUCOSE SERPL-MCNC: 120 MG/DL (ref 70–110)
HCT VFR BLD AUTO: 24.3 % (ref 40–54)
HGB BLD-MCNC: 7.1 G/DL (ref 14–18)
IMM GRANULOCYTES # BLD AUTO: 0.02 K/UL (ref 0–0.04)
IMM GRANULOCYTES NFR BLD AUTO: 0.4 % (ref 0–0.5)
INR PPP: 1.9 (ref 0.8–1.2)
LYMPHOCYTES # BLD AUTO: 0.6 K/UL (ref 1–4.8)
LYMPHOCYTES NFR BLD: 10.2 % (ref 18–48)
MAGNESIUM SERPL-MCNC: 2.5 MG/DL (ref 1.6–2.6)
MCH RBC QN AUTO: 24.7 PG (ref 27–31)
MCHC RBC AUTO-ENTMCNC: 29.2 G/DL (ref 32–36)
MCV RBC AUTO: 85 FL (ref 82–98)
MONOCYTES # BLD AUTO: 1.1 K/UL (ref 0.3–1)
MONOCYTES NFR BLD: 19.1 % (ref 4–15)
NEUTROPHILS # BLD AUTO: 3.6 K/UL (ref 1.8–7.7)
NEUTROPHILS NFR BLD: 63.3 % (ref 38–73)
NRBC BLD-RTO: 0 /100 WBC
PHOSPHATE SERPL-MCNC: 4.8 MG/DL (ref 2.7–4.5)
PLATELET # BLD AUTO: 220 K/UL (ref 150–350)
PMV BLD AUTO: 9.9 FL (ref 9.2–12.9)
POCT GLUCOSE: 165 MG/DL (ref 70–110)
POTASSIUM SERPL-SCNC: 4.2 MMOL/L (ref 3.5–5.1)
PROT SERPL-MCNC: 8.6 G/DL (ref 6–8.4)
PROTHROMBIN TIME: 18.5 SEC (ref 9–12.5)
RBC # BLD AUTO: 2.87 M/UL (ref 4.6–6.2)
SODIUM SERPL-SCNC: 139 MMOL/L (ref 136–145)
WBC # BLD AUTO: 5.7 K/UL (ref 3.9–12.7)

## 2019-12-28 PROCEDURE — 63600175 PHARM REV CODE 636 W HCPCS: Performed by: HOSPITALIST

## 2019-12-28 PROCEDURE — 99233 PR SUBSEQUENT HOSPITAL CARE,LEVL III: ICD-10-PCS | Mod: ,,, | Performed by: HOSPITALIST

## 2019-12-28 PROCEDURE — 80053 COMPREHEN METABOLIC PANEL: CPT

## 2019-12-28 PROCEDURE — 83735 ASSAY OF MAGNESIUM: CPT

## 2019-12-28 PROCEDURE — 85025 COMPLETE CBC W/AUTO DIFF WBC: CPT

## 2019-12-28 PROCEDURE — 84100 ASSAY OF PHOSPHORUS: CPT

## 2019-12-28 PROCEDURE — 99233 SBSQ HOSP IP/OBS HIGH 50: CPT | Mod: ,,, | Performed by: HOSPITALIST

## 2019-12-28 PROCEDURE — 93010 ELECTROCARDIOGRAM REPORT: CPT | Mod: ,,, | Performed by: INTERNAL MEDICINE

## 2019-12-28 PROCEDURE — 93010 EKG 12-LEAD: ICD-10-PCS | Mod: ,,, | Performed by: INTERNAL MEDICINE

## 2019-12-28 PROCEDURE — 36415 COLL VENOUS BLD VENIPUNCTURE: CPT

## 2019-12-28 PROCEDURE — 85610 PROTHROMBIN TIME: CPT

## 2019-12-28 PROCEDURE — 25000003 PHARM REV CODE 250: Performed by: HOSPITALIST

## 2019-12-28 PROCEDURE — 20600001 HC STEP DOWN PRIVATE ROOM

## 2019-12-28 PROCEDURE — 93005 ELECTROCARDIOGRAM TRACING: CPT

## 2019-12-28 RX ORDER — FUROSEMIDE 10 MG/ML
80 INJECTION INTRAMUSCULAR; INTRAVENOUS ONCE
Status: COMPLETED | OUTPATIENT
Start: 2019-12-28 | End: 2019-12-28

## 2019-12-28 RX ORDER — LIDOCAINE 50 MG/G
1 PATCH TOPICAL
Status: DISCONTINUED | OUTPATIENT
Start: 2019-12-28 | End: 2020-01-13

## 2019-12-28 RX ADMIN — FUROSEMIDE 20 MG/HR: 10 INJECTION, SOLUTION INTRAMUSCULAR; INTRAVENOUS at 11:12

## 2019-12-28 RX ADMIN — HYDRALAZINE HYDROCHLORIDE AND ISOSORBIDE DINITRATE 1 TABLET: 37.5; 2 TABLET, FILM COATED ORAL at 09:12

## 2019-12-28 RX ADMIN — HYDRALAZINE HYDROCHLORIDE AND ISOSORBIDE DINITRATE 1 TABLET: 37.5; 2 TABLET, FILM COATED ORAL at 08:12

## 2019-12-28 RX ADMIN — LIDOCAINE 1 PATCH: 50 PATCH TOPICAL at 11:12

## 2019-12-28 RX ADMIN — MORPHINE SULFATE 1 MG: 2 INJECTION, SOLUTION INTRAMUSCULAR; INTRAVENOUS at 08:12

## 2019-12-28 RX ADMIN — CHLOROTHIAZIDE SODIUM 500 MG: 500 INJECTION, POWDER, LYOPHILIZED, FOR SOLUTION INTRAVENOUS at 01:12

## 2019-12-28 RX ADMIN — DOBUTAMINE IN DEXTROSE 2.5 MCG/KG/MIN: 200 INJECTION, SOLUTION INTRAVENOUS at 11:12

## 2019-12-28 RX ADMIN — FUROSEMIDE 20 MG/HR: 10 INJECTION, SOLUTION INTRAMUSCULAR; INTRAVENOUS at 09:12

## 2019-12-28 RX ADMIN — WARFARIN SODIUM 8 MG: 3 TABLET ORAL at 05:12

## 2019-12-28 RX ADMIN — ASPIRIN 81 MG: 81 TABLET, COATED ORAL at 09:12

## 2019-12-28 RX ADMIN — FERROUS SULFATE TAB EC 325 MG (65 MG FE EQUIVALENT) 325 MG: 325 (65 FE) TABLET DELAYED RESPONSE at 09:12

## 2019-12-28 RX ADMIN — FUROSEMIDE 20 MG/HR: 10 INJECTION, SOLUTION INTRAMUSCULAR; INTRAVENOUS at 01:12

## 2019-12-28 RX ADMIN — FUROSEMIDE 80 MG: 10 INJECTION, SOLUTION INTRAMUSCULAR; INTRAVENOUS at 04:12

## 2019-12-28 NOTE — PLAN OF CARE
Pt maintained free from falls/ trauma/ injuries and skin breakdown. He complain of some neck pain on the right side, nicotine patch is applied per order. Pt is A-fib on the monitor, on dobutamine drip 2.5mcg/min, was increased to 5mcg/min and pt had a 5 beats run of V-tech.  rate dropped back down to 2.5 per Dr. Goins order. Pt has another 8 beats run of V-tach this afternoon, asymptomatic both occurrence; MD aware. Pt is getting diuresing with IV lasix and Diuril IV, as well as IVP lasix 80mg once. I and O documented. Fluid restriction reinforced to the pt. Pt received 8mg Coumadin, INR 1.9. Pt refused his lab to be drawn this afternoon, MD aware. Pt also been complaining of shaking of both hands this am, blood glucose 165 at the time, MD notified about the symptom. Plan of care reviewed to the patient. All questions and concerns addressed. Will continue to monitor.

## 2019-12-28 NOTE — PLAN OF CARE
Patient free from falls and injury throughout shift. Patient AAO x4; VSS. Patient denies chest pain and SOB. Lasix and dobutamine infusing as ordered. Plan of care reviewed with patient. Patient verbalizes understanding. Will continue to monitor.

## 2019-12-28 NOTE — NURSING
Pt has a 5 beats run of V-tach after changing the dobutamine drip rate to 5mcg/min from 2.5 mcg/min. Pt asymptomatic, Dr. Goins notified and assessed pt. Dobutamine rate changed back to 2.5 mcg/min by Dr. Goins.

## 2019-12-28 NOTE — PROGRESS NOTES
First  was unsuccessful. Patient refused second . Says he may let another tech try again after breakfast.     Patient also refused weight with night tech.

## 2019-12-28 NOTE — PROGRESS NOTES
Progress Note  Hospital Medicine    Provider team:    [facility] [place] [facility] [place]  [facility] [place] A - CARDIOLOGY CONSULT STEPDOWN  Admit Date: 12/24/2019  Encounter Date: 12/28/2019     SUBJECTIVE:     Follow-up Visit for: Acute on chronic combined systolic and diastolic congestive heart failure    HPI (See H&P for complete P,F,SHx):  54 y.o. male with chronic combined systolic and diastolic heart failure (EF 25%), afib on Coumadin, CAD, CVA, and history of PE who presents to the ED for evaluation of shortness of breath and edema.  He was just admitted to Hospital Medicine from 11/24-12/8 for a CHF exacerbation.  During that admission, he was evaluated by Cardiology who started him on Dobutamine and Diuril.  He was resistant to HTS evaluation during that stay, which included recommendations for LVAD, transplant, and ICD.  He was discharged home on Lasix 80mg PO BID.  He endorses compliance with his diuretic regimen, but despite this and following a low salt diet, he has continued to gain weight, have lower extremity and abdominal swelling, as well as shortness of breath.  He is so short of breath that he has to sleep in a chair, and has been unable to sleep because of his breathing.  He denies any chest pain.  He does have a cough that is mostly dry, but occasionally productive of blood tinged sputum.  He was discharged weight a weight of 259lbs, and claims that is around his dry weight.  He is currently 286lbs.       Upon arrival to the ED, vitals were /83, HR 88, RR 22.  Labs showed Hemoglobin 7.3, INR 1.5, Creatinine 1.8, Alk Phos 278,  with Trop 0.128.  CXR showed cardiomegaly and pulmonary edema, along with a masslike opacity.  CT chest was attempted to be ordered to evaluate the mass, but he was unable to lie flat to get it evaluated.  He was given Lasix 80mg IV and was admitted to Hospital Medicine for further management.    TTE 11/27/2019  · Severely decreased left ventricular systolic function.   · The  estimated ejection fraction is 25%  · Concentric left ventricular hypertrophy.  · Global hypokinetic wall motion.  · Moderate right ventricular enlargement.  · Moderately reduced right ventricular systolic function.  · Severe biatrial enlargement.  · Moderate mitral regurgitation.  · Moderate tricuspid regurgitation.  · The estimated PA systolic pressure is 40 mm Hg  · Elevated central venous pressure (15 mm Hg).  · Atrial fibrillation observed.    Interval history:  Has his covers over his head this morning. I had to personally remove the covers in order to examine him. States that his head is cold. Still reports shortness of breath, unchanged. Net negative 2 L yesterday but weight reportedly up 10 lbs. Cr 2.7 > 3.1. Cont  and Lasix gtt's with one time bolus of 80 mg. Cont Diuril BID. Continues to refuse home . Will consider outpatient Lasix infusions, only if there's a handicap spot for parking.     Review of Systems:  Review of Systems   Constitutional: Negative for chills and fever.   HENT: Negative for congestion and sore throat.    Eyes: Negative for photophobia, pain and discharge.   Respiratory: Positive for shortness of breath. Negative for cough, hemoptysis and sputum production.    Cardiovascular: Positive for orthopnea, leg swelling and PND. Negative for chest pain and palpitations.   Gastrointestinal: Negative for abdominal pain, diarrhea, nausea and vomiting.   Genitourinary: Negative for dysuria and urgency.   Musculoskeletal: Negative for myalgias and neck pain.   Skin: Negative for itching and rash.   Neurological: Negative for sensory change, focal weakness and headaches.   Endo/Heme/Allergies: Negative for polydipsia. Does not bruise/bleed easily.   Psychiatric/Behavioral: Negative for depression and suicidal ideas.     Past Medical History:   Diagnosis Date    Anticoagulant long-term use     Cardiomegaly     Chronic combined systolic and diastolic congestive heart failure     Coronary  artery disease     Heart attack 2006    Hypertension     Hyperthyroidism, subclinical 1/2/2013    MI (myocardial infarction) 9/22/2013    MI in 2009      Paroxysmal atrial fibrillation     PE (pulmonary embolism) 1/1/2013    IN 2010     S/P ablation of atrial flutter 2008    Stroke 2009    no residual weaknesses     Social History     Socioeconomic History    Marital status: Single     Spouse name: Not on file    Number of children: Not on file    Years of education: Not on file    Highest education level: Not on file   Occupational History    Not on file   Social Needs    Financial resource strain: Not on file    Food insecurity:     Worry: Not on file     Inability: Not on file    Transportation needs:     Medical: Not on file     Non-medical: Not on file   Tobacco Use    Smoking status: Never Smoker    Smokeless tobacco: Never Used   Substance and Sexual Activity    Alcohol use: No    Drug use: No    Sexual activity: Never   Lifestyle    Physical activity:     Days per week: Not on file     Minutes per session: Not on file    Stress: Not on file   Relationships    Social connections:     Talks on phone: Not on file     Gets together: Not on file     Attends [employer] service: Not on file     Active member of club or organization: Not on file     Attends meetings of clubs or organizations: Not on file     Relationship status: Not on file   Other Topics Concern    Not on file   Social History Narrative    Not on file       OBJECTIVE:       Intake/Output Summary (Last 24 hours) at 12/28/2019 1131  Last data filed at 12/28/2019 0900  Gross per 24 hour   Intake 1740 ml   Output 3850 ml   Net -2110 ml     Vital Signs Range (Last 24H):  Temp:  [97.6 °F (36.4 °C)-98.7 °F (37.1 °C)]   Pulse:  []   Resp:  [16-18]   BP: ()/(55-75)   SpO2:  [88 %-94 %]   Body mass index is 43.67 kg/m².    Objective:  General Appearance:  Comfortable, well-appearing, in no acute distress and not in pain.   "  Vital signs: (most recent): Blood pressure (!) 112/56, pulse 109, temperature 98.2 °F (36.8 °C), temperature source Oral, resp. rate 16, height 5' 9" (1.753 m), weight 134.1 kg (295 lb 12 oz), SpO2 (!) 94 %.  No fever.    Output: Producing urine and producing stool.    HEENT: Normal HEENT exam.  (+JVD)    Lungs:  Normal effort.  Breath sounds clear to auscultation.  He is not in respiratory distress.  There are rales.  No wheezes.    Heart: Normal rate.  Regular rhythm.  S1 normal and S2 normal.  Positive for murmur.    Chest: Symmetric chest wall expansion.   Abdomen: Abdomen is soft.  Bowel sounds are normal.   There is no abdominal tenderness.     Extremities: Normal range of motion.  There is venous stasis and dependent edema.  There is no deformity, effusion or local swelling.    Pulses: Distal pulses are intact.    Neurological: Patient is alert and oriented to person, place and time.  Normal strength.    Pupils:  Pupils are equal, round, and reactive to light.    Skin:  Warm and dry.      Medications:  Medication list was reviewed in EPIC and changes noted under Assessment/Plan and MAR.    Laboratory:  Recent Labs     12/28/19  0829   WBC 5.70   RBC 2.87*   HGB 7.1*   HCT 24.3*      MCV 85   MCH 24.7*   MCHC 29.2*   GRAN 63.3  3.6   LYMPH 10.2*  0.6*   MONO 19.1*  1.1*   EOS 0.4      Recent Labs     12/28/19  0829   *      K 4.2   CL 98   CO2 27   BUN 93*   CREATININE 3.1*   CALCIUM 9.5   ANIONGAP 14   MG 2.5   PHOS 4.8*     Prior records reviewed.    ECHO reviewed:  · Severely decreased left ventricular systolic function. The estimated ejection fraction is 25%  · Concentric left ventricular hypertrophy.  · Global hypokinetic wall motion.  · Moderate right ventricular enlargement.  · Moderately reduced right ventricular systolic function.  · Severe biatrial enlargement.  · Moderate mitral regurgitation.  · Moderate tricuspid regurgitation.  · The estimated PA systolic pressure is 40 " mm Hg  · Elevated central venous pressure (15 mm Hg).  · Atrial fibrillation observed.      Imaging reviewed  Imaging Results          CT Chest Without Contrast (No Result on File)                X-Ray Chest AP Portable (Final result)  Result time 12/24/19 19:09:07    Final result by Zeb Hirsch MD (12/24/19 19:09:07)                 Impression:      Masslike opacity overlying the right lower lung zone, similar to prior exam.  Right pleural fluid not excluded.  Chest CT recommended for further evaluation of right lung masslike opacity.    Patchy bilateral pulmonary opacities, similar to prior exam.    Cardiomegaly.    Electronically signed by resident: Cristofer April  Date:    12/24/2019  Time:    18:48    Electronically signed by: Zeb Hirsch MD  Date:    12/24/2019  Time:    19:09             Narrative:    EXAMINATION:  XR CHEST AP PORTABLE    CLINICAL HISTORY:  CHF;    TECHNIQUE:  Single frontal view of the chest was performed.    COMPARISON:  Chest radiograph 12/03/2019    FINDINGS:  Cardiac leads overlie the chest wall.    Redemonstration of extensive bilateral patchy opacities, more prominent on the right with a focal area of masslike opacity in the right lower lung zone.  No significant change from prior exam.  Left costophrenic angle is visible and right sided pleural fluid not excluded noting presence of opacification.  No pneumothorax.    The cardiac silhouette is enlarged.  Hilar and mediastinal contours are unchanged.    Bones are intact.                                  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 [I13.0]  Yes    Elevated alkaline phosphatase level [R74.8]  Yes    Coronary artery disease [I25.10]  Yes     Chronic    Stage 3 chronic kidney disease [N18.3]  Yes    Subtherapeutic international normalized  ratio (INR) [R79.1]  Yes    Personal history of cerebral embolism [Z86.79]  Not Applicable    Personal history of venous thrombosis and embolism [Z86.718]  Not Applicable    Personal history of MI (myocardial infarction) [I25.2]  Not Applicable    Essential hypertension [I10]  Yes     Chronic    Atrial fibrillation, chronic [I48.20]  Yes     Chronic    Chronic anticoagulation [Z79.01]  Not Applicable     Chronic    Cardiomyopathy [I42.9]  Yes     Chronic      Resolved Hospital Problems   No resolved problems to display.      Acute on Chronic Combined Systolic and Diastolic Heart Failure  Cardiomyopathy  · CHF Pathway initiated  · Last 2D echo Nov 2019 with EF 25%  ·  and CXR with pulmonary edema and cardiomegaly  · Cont Lasix 20 gtt w/ 1 time bolus 80 mg today.  2.5. Diuril 500 BID. Pt continues to report disinterest in home dobutamine or palliative care. Pt with extremely poor insight into his disease. States he'll consider outpt Lasix infusions only if there's a handicap spot for him to park in.  · Goal diuresis 2-3L/day.  Home diuretic dose:  Lasix 80mg PO BID  · BID BMP (with morning labs and at 4pm)  · Strict I&Os with daily weights.  Dry weight 232 lb?  Admit weight 286lbs.  · Hold BB, okay to c/w BiDil     Chronic AFib  Long Term Use of Anticoagulants  Subtherapeutic INR  · Chronic issue  · HR in 100-120's. Resume BB  · Continue Warfarin - Pharmacy consulted for dosing management     Essential HTN  · Chronic and stable  · Continue Bidil BID  · Resume BB     CAD  History of MI  · Chronic and stable  · Continue Aspirin 81mg PO daily  · Continue Bidil BID  · Resume BB     Anemia  · Hgb 7.3 on admit; stable  · Received 5 units PRBCs on prior hospital admission  · Continue PO iron  · Type and screen obtained  · Monitor for bleeding/need for transfusion     History of VTE  History of Stroke  · Chronic and stable  · Continue Aspirin 81mg PO daily  · Continue Warfarin as above     Stage 3  CKD  · Chronic and stable  · Monitor with diuresis     Elevated Alk Phos  · Chronic and stable  · Monitor     Benign Hypertensive Heart and Kidney Disease with HF and CKD  · As above     Abnormal CT  · Needs CT chest when able to lie flat or outpt to evaluate masslike opacity seen on CXR        Diet:  Low Sodium, 1.5L fluid restriction  VTE PPx:  Warfarin  Goals of Care:  Full     High Risk Conditions:   Patient is currently on drug therapy requiring intensive monitoring for toxicity: Lasix gtt     Anticipated discharge date and disposition:   Pending diuresis    Antoinette Goins MD  [facility] Medicine

## 2019-12-29 LAB
ALBUMIN SERPL BCP-MCNC: 3.2 G/DL (ref 3.5–5.2)
ALP SERPL-CCNC: 278 U/L (ref 55–135)
ALT SERPL W/O P-5'-P-CCNC: 16 U/L (ref 10–44)
ANION GAP SERPL CALC-SCNC: 13 MMOL/L (ref 8–16)
ANION GAP SERPL CALC-SCNC: 15 MMOL/L (ref 8–16)
AST SERPL-CCNC: 19 U/L (ref 10–40)
BASOPHILS # BLD AUTO: 0.03 K/UL (ref 0–0.2)
BASOPHILS NFR BLD: 0.5 % (ref 0–1.9)
BILIRUB SERPL-MCNC: 1 MG/DL (ref 0.1–1)
BUN SERPL-MCNC: 96 MG/DL (ref 6–20)
BUN SERPL-MCNC: 97 MG/DL (ref 6–20)
CALCIUM SERPL-MCNC: 9.4 MG/DL (ref 8.7–10.5)
CALCIUM SERPL-MCNC: 9.6 MG/DL (ref 8.7–10.5)
CHLORIDE SERPL-SCNC: 96 MMOL/L (ref 95–110)
CHLORIDE SERPL-SCNC: 97 MMOL/L (ref 95–110)
CO2 SERPL-SCNC: 28 MMOL/L (ref 23–29)
CO2 SERPL-SCNC: 30 MMOL/L (ref 23–29)
CREAT SERPL-MCNC: 3.1 MG/DL (ref 0.5–1.4)
CREAT SERPL-MCNC: 3.2 MG/DL (ref 0.5–1.4)
DIFFERENTIAL METHOD: ABNORMAL
EOSINOPHIL # BLD AUTO: 0.4 K/UL (ref 0–0.5)
EOSINOPHIL NFR BLD: 5.6 % (ref 0–8)
ERYTHROCYTE [DISTWIDTH] IN BLOOD BY AUTOMATED COUNT: 24.3 % (ref 11.5–14.5)
EST. GFR  (AFRICAN AMERICAN): 24.2 ML/MIN/1.73 M^2
EST. GFR  (AFRICAN AMERICAN): 25.2 ML/MIN/1.73 M^2
EST. GFR  (NON AFRICAN AMERICAN): 21 ML/MIN/1.73 M^2
EST. GFR  (NON AFRICAN AMERICAN): 21.8 ML/MIN/1.73 M^2
GLUCOSE SERPL-MCNC: 110 MG/DL (ref 70–110)
GLUCOSE SERPL-MCNC: 118 MG/DL (ref 70–110)
HCT VFR BLD AUTO: 23.2 % (ref 40–54)
HGB BLD-MCNC: 6.7 G/DL (ref 14–18)
IMM GRANULOCYTES # BLD AUTO: 0.02 K/UL (ref 0–0.04)
IMM GRANULOCYTES NFR BLD AUTO: 0.3 % (ref 0–0.5)
INR PPP: 2.2 (ref 0.8–1.2)
LYMPHOCYTES # BLD AUTO: 0.6 K/UL (ref 1–4.8)
LYMPHOCYTES NFR BLD: 9.8 % (ref 18–48)
MAGNESIUM SERPL-MCNC: 2.5 MG/DL (ref 1.6–2.6)
MCH RBC QN AUTO: 24.5 PG (ref 27–31)
MCHC RBC AUTO-ENTMCNC: 28.9 G/DL (ref 32–36)
MCV RBC AUTO: 85 FL (ref 82–98)
MONOCYTES # BLD AUTO: 1.3 K/UL (ref 0.3–1)
MONOCYTES NFR BLD: 20.5 % (ref 4–15)
NEUTROPHILS # BLD AUTO: 4 K/UL (ref 1.8–7.7)
NEUTROPHILS NFR BLD: 63.3 % (ref 38–73)
NRBC BLD-RTO: 0 /100 WBC
PHOSPHATE SERPL-MCNC: 4.3 MG/DL (ref 2.7–4.5)
PLATELET # BLD AUTO: 211 K/UL (ref 150–350)
PMV BLD AUTO: 9.8 FL (ref 9.2–12.9)
POTASSIUM SERPL-SCNC: 3.8 MMOL/L (ref 3.5–5.1)
POTASSIUM SERPL-SCNC: 4 MMOL/L (ref 3.5–5.1)
PROT SERPL-MCNC: 8.4 G/DL (ref 6–8.4)
PROTHROMBIN TIME: 20.7 SEC (ref 9–12.5)
RBC # BLD AUTO: 2.73 M/UL (ref 4.6–6.2)
SODIUM SERPL-SCNC: 137 MMOL/L (ref 136–145)
SODIUM SERPL-SCNC: 142 MMOL/L (ref 136–145)
WBC # BLD AUTO: 6.25 K/UL (ref 3.9–12.7)

## 2019-12-29 PROCEDURE — 36415 COLL VENOUS BLD VENIPUNCTURE: CPT

## 2019-12-29 PROCEDURE — 25000003 PHARM REV CODE 250: Performed by: HOSPITALIST

## 2019-12-29 PROCEDURE — 63600175 PHARM REV CODE 636 W HCPCS: Performed by: HOSPITALIST

## 2019-12-29 PROCEDURE — 20600001 HC STEP DOWN PRIVATE ROOM

## 2019-12-29 PROCEDURE — 85025 COMPLETE CBC W/AUTO DIFF WBC: CPT

## 2019-12-29 PROCEDURE — 99233 PR SUBSEQUENT HOSPITAL CARE,LEVL III: ICD-10-PCS | Mod: ,,, | Performed by: HOSPITALIST

## 2019-12-29 PROCEDURE — 83735 ASSAY OF MAGNESIUM: CPT

## 2019-12-29 PROCEDURE — 80053 COMPREHEN METABOLIC PANEL: CPT

## 2019-12-29 PROCEDURE — 85610 PROTHROMBIN TIME: CPT

## 2019-12-29 PROCEDURE — 99233 SBSQ HOSP IP/OBS HIGH 50: CPT | Mod: ,,, | Performed by: HOSPITALIST

## 2019-12-29 PROCEDURE — 84100 ASSAY OF PHOSPHORUS: CPT

## 2019-12-29 PROCEDURE — 80048 BASIC METABOLIC PNL TOTAL CA: CPT

## 2019-12-29 RX ORDER — POTASSIUM CHLORIDE 20 MEQ/1
20 TABLET, EXTENDED RELEASE ORAL ONCE
Status: DISCONTINUED | OUTPATIENT
Start: 2019-12-29 | End: 2019-12-29

## 2019-12-29 RX ORDER — FUROSEMIDE 10 MG/ML
80 INJECTION INTRAMUSCULAR; INTRAVENOUS ONCE
Status: COMPLETED | OUTPATIENT
Start: 2019-12-29 | End: 2019-12-29

## 2019-12-29 RX ORDER — OXYMETAZOLINE HCL 0.05 %
2 SPRAY, NON-AEROSOL (ML) NASAL 2 TIMES DAILY
Status: COMPLETED | OUTPATIENT
Start: 2019-12-29 | End: 2019-12-29

## 2019-12-29 RX ORDER — OXYMETAZOLINE HCL 0.05 %
2 SPRAY, NON-AEROSOL (ML) NASAL 2 TIMES DAILY PRN
Status: DISPENSED | OUTPATIENT
Start: 2019-12-29 | End: 2020-01-01

## 2019-12-29 RX ORDER — POTASSIUM CHLORIDE 20 MEQ/15ML
20 SOLUTION ORAL ONCE
Status: COMPLETED | OUTPATIENT
Start: 2019-12-29 | End: 2019-12-29

## 2019-12-29 RX ADMIN — CHLOROTHIAZIDE SODIUM 500 MG: 500 INJECTION, POWDER, LYOPHILIZED, FOR SOLUTION INTRAVENOUS at 01:12

## 2019-12-29 RX ADMIN — PROMETHAZINE HYDROCHLORIDE 25 MG: 25 INJECTION INTRAMUSCULAR; INTRAVENOUS at 01:12

## 2019-12-29 RX ADMIN — DOBUTAMINE IN DEXTROSE 2.5 MCG/KG/MIN: 200 INJECTION, SOLUTION INTRAVENOUS at 01:12

## 2019-12-29 RX ADMIN — OXYMETAZOLINE HYDROCHLORIDE 2 SPRAY: 0.05 SPRAY NASAL at 08:12

## 2019-12-29 RX ADMIN — HYDRALAZINE HYDROCHLORIDE AND ISOSORBIDE DINITRATE 1 TABLET: 37.5; 2 TABLET, FILM COATED ORAL at 08:12

## 2019-12-29 RX ADMIN — POTASSIUM CHLORIDE 20 MEQ: 20 SOLUTION ORAL at 12:12

## 2019-12-29 RX ADMIN — Medication 2 SPRAY: at 12:12

## 2019-12-29 RX ADMIN — FUROSEMIDE 20 MG/HR: 10 INJECTION, SOLUTION INTRAMUSCULAR; INTRAVENOUS at 08:12

## 2019-12-29 RX ADMIN — Medication 2 SPRAY: at 11:12

## 2019-12-29 RX ADMIN — CHLOROTHIAZIDE SODIUM 500 MG: 500 INJECTION, POWDER, LYOPHILIZED, FOR SOLUTION INTRAVENOUS at 12:12

## 2019-12-29 RX ADMIN — FUROSEMIDE 25 MG/HR: 10 INJECTION, SOLUTION INTRAMUSCULAR; INTRAVENOUS at 11:12

## 2019-12-29 RX ADMIN — FUROSEMIDE 80 MG: 10 INJECTION, SOLUTION INTRAMUSCULAR; INTRAVENOUS at 12:12

## 2019-12-29 RX ADMIN — ASPIRIN 81 MG: 81 TABLET, COATED ORAL at 08:12

## 2019-12-29 RX ADMIN — ONDANSETRON 8 MG: 2 INJECTION INTRAMUSCULAR; INTRAVENOUS at 10:12

## 2019-12-29 RX ADMIN — FUROSEMIDE 25 MG/HR: 10 INJECTION, SOLUTION INTRAMUSCULAR; INTRAVENOUS at 05:12

## 2019-12-29 RX ADMIN — MORPHINE SULFATE 1 MG: 2 INJECTION, SOLUTION INTRAMUSCULAR; INTRAVENOUS at 08:12

## 2019-12-29 RX ADMIN — WARFARIN SODIUM 8 MG: 3 TABLET ORAL at 05:12

## 2019-12-29 RX ADMIN — CHLOROTHIAZIDE SODIUM 500 MG: 500 INJECTION, POWDER, LYOPHILIZED, FOR SOLUTION INTRAVENOUS at 11:12

## 2019-12-29 RX ADMIN — FERROUS SULFATE TAB EC 325 MG (65 MG FE EQUIVALENT) 325 MG: 325 (65 FE) TABLET DELAYED RESPONSE at 08:12

## 2019-12-29 NOTE — PLAN OF CARE
Pt remains free from falls and injuries. VSS. Lasix and  gtt infusing per MD orders. Diuril given. Pt c/o bloody mucus and vomiting. PRN Zofran given with no relief, phenergan seemed to resolve the vomiting. Afrin given for bloody nose. Strict I&Os enforced.

## 2019-12-29 NOTE — PLAN OF CARE
"Patient free from falls and injury throughout shift. Patient AAO x4; VSS. Patient denies chest pain and SOB. Complains of hurting "everywhere" 10/10 relieved by PRN morphine. Dobutamine and lasix gtts infusing as ordered. IV diuril administered. Patient noncompliant with fluid restriction; requests fluids constantly. Patient educated on purpose of fluid restriction in relation to HF. Refused labs during day shift. Patient experiencing nosebleeds--swallowing blood and spit out a half-dollar sized clot. Requested that his "coumadin drip" be stopped because he believed it was causing his nosebleeds. Patient was educated on dobutamine and lasix gtts. Plan of care reviewed with patient. Patient verbalizes understanding. Will continue to monitor.     "

## 2019-12-30 LAB
ALBUMIN SERPL BCP-MCNC: 3.2 G/DL (ref 3.5–5.2)
ALP SERPL-CCNC: 268 U/L (ref 55–135)
ALT SERPL W/O P-5'-P-CCNC: 17 U/L (ref 10–44)
ANION GAP SERPL CALC-SCNC: 13 MMOL/L (ref 8–16)
ANION GAP SERPL CALC-SCNC: 13 MMOL/L (ref 8–16)
AST SERPL-CCNC: 18 U/L (ref 10–40)
BASOPHILS # BLD AUTO: 0.04 K/UL (ref 0–0.2)
BASOPHILS NFR BLD: 0.7 % (ref 0–1.9)
BILIRUB SERPL-MCNC: 1.1 MG/DL (ref 0.1–1)
BUN SERPL-MCNC: 95 MG/DL (ref 6–20)
BUN SERPL-MCNC: 99 MG/DL (ref 6–20)
CALCIUM SERPL-MCNC: 9.5 MG/DL (ref 8.7–10.5)
CALCIUM SERPL-MCNC: 9.5 MG/DL (ref 8.7–10.5)
CHLORIDE SERPL-SCNC: 94 MMOL/L (ref 95–110)
CHLORIDE SERPL-SCNC: 95 MMOL/L (ref 95–110)
CO2 SERPL-SCNC: 33 MMOL/L (ref 23–29)
CO2 SERPL-SCNC: 33 MMOL/L (ref 23–29)
CREAT SERPL-MCNC: 3 MG/DL (ref 0.5–1.4)
CREAT SERPL-MCNC: 3.1 MG/DL (ref 0.5–1.4)
DIFFERENTIAL METHOD: ABNORMAL
EOSINOPHIL # BLD AUTO: 0.5 K/UL (ref 0–0.5)
EOSINOPHIL NFR BLD: 9 % (ref 0–8)
ERYTHROCYTE [DISTWIDTH] IN BLOOD BY AUTOMATED COUNT: 23.9 % (ref 11.5–14.5)
EST. GFR  (AFRICAN AMERICAN): 25.2 ML/MIN/1.73 M^2
EST. GFR  (AFRICAN AMERICAN): 26.2 ML/MIN/1.73 M^2
EST. GFR  (NON AFRICAN AMERICAN): 21.8 ML/MIN/1.73 M^2
EST. GFR  (NON AFRICAN AMERICAN): 22.7 ML/MIN/1.73 M^2
GLUCOSE SERPL-MCNC: 136 MG/DL (ref 70–110)
GLUCOSE SERPL-MCNC: 143 MG/DL (ref 70–110)
HCT VFR BLD AUTO: 24.8 % (ref 40–54)
HGB BLD-MCNC: 7.1 G/DL (ref 14–18)
IMM GRANULOCYTES # BLD AUTO: 0.02 K/UL (ref 0–0.04)
IMM GRANULOCYTES NFR BLD AUTO: 0.3 % (ref 0–0.5)
INR PPP: 2.3 (ref 0.8–1.2)
LYMPHOCYTES # BLD AUTO: 0.6 K/UL (ref 1–4.8)
LYMPHOCYTES NFR BLD: 9.7 % (ref 18–48)
MAGNESIUM SERPL-MCNC: 2.4 MG/DL (ref 1.6–2.6)
MCH RBC QN AUTO: 24.3 PG (ref 27–31)
MCHC RBC AUTO-ENTMCNC: 28.6 G/DL (ref 32–36)
MCV RBC AUTO: 85 FL (ref 82–98)
MONOCYTES # BLD AUTO: 1 K/UL (ref 0.3–1)
MONOCYTES NFR BLD: 16.5 % (ref 4–15)
NEUTROPHILS # BLD AUTO: 3.7 K/UL (ref 1.8–7.7)
NEUTROPHILS NFR BLD: 63.8 % (ref 38–73)
NRBC BLD-RTO: 0 /100 WBC
PHOSPHATE SERPL-MCNC: 4.4 MG/DL (ref 2.7–4.5)
PLATELET # BLD AUTO: 216 K/UL (ref 150–350)
PMV BLD AUTO: 9.4 FL (ref 9.2–12.9)
POTASSIUM SERPL-SCNC: 3.4 MMOL/L (ref 3.5–5.1)
POTASSIUM SERPL-SCNC: 3.8 MMOL/L (ref 3.5–5.1)
PROT SERPL-MCNC: 8.3 G/DL (ref 6–8.4)
PROTHROMBIN TIME: 22.2 SEC (ref 9–12.5)
RBC # BLD AUTO: 2.92 M/UL (ref 4.6–6.2)
SODIUM SERPL-SCNC: 140 MMOL/L (ref 136–145)
SODIUM SERPL-SCNC: 141 MMOL/L (ref 136–145)
WBC # BLD AUTO: 5.87 K/UL (ref 3.9–12.7)

## 2019-12-30 PROCEDURE — 25000003 PHARM REV CODE 250: Performed by: HOSPITALIST

## 2019-12-30 PROCEDURE — 80048 BASIC METABOLIC PNL TOTAL CA: CPT

## 2019-12-30 PROCEDURE — 83735 ASSAY OF MAGNESIUM: CPT

## 2019-12-30 PROCEDURE — 36415 COLL VENOUS BLD VENIPUNCTURE: CPT

## 2019-12-30 PROCEDURE — 63600175 PHARM REV CODE 636 W HCPCS: Performed by: HOSPITALIST

## 2019-12-30 PROCEDURE — 84100 ASSAY OF PHOSPHORUS: CPT

## 2019-12-30 PROCEDURE — 97161 PT EVAL LOW COMPLEX 20 MIN: CPT

## 2019-12-30 PROCEDURE — 99233 PR SUBSEQUENT HOSPITAL CARE,LEVL III: ICD-10-PCS | Mod: ,,, | Performed by: HOSPITALIST

## 2019-12-30 PROCEDURE — 97165 OT EVAL LOW COMPLEX 30 MIN: CPT

## 2019-12-30 PROCEDURE — 85025 COMPLETE CBC W/AUTO DIFF WBC: CPT

## 2019-12-30 PROCEDURE — 99233 SBSQ HOSP IP/OBS HIGH 50: CPT | Mod: ,,, | Performed by: HOSPITALIST

## 2019-12-30 PROCEDURE — 85610 PROTHROMBIN TIME: CPT

## 2019-12-30 PROCEDURE — 20600001 HC STEP DOWN PRIVATE ROOM

## 2019-12-30 PROCEDURE — 80053 COMPREHEN METABOLIC PANEL: CPT

## 2019-12-30 RX ORDER — OXYMETAZOLINE HCL 0.05 %
2 SPRAY, NON-AEROSOL (ML) NASAL 3 TIMES DAILY
Status: DISPENSED | OUTPATIENT
Start: 2019-12-30 | End: 2019-12-31

## 2019-12-30 RX ORDER — POTASSIUM CHLORIDE 20 MEQ/15ML
20 SOLUTION ORAL
Status: COMPLETED | OUTPATIENT
Start: 2019-12-30 | End: 2019-12-30

## 2019-12-30 RX ADMIN — HYDRALAZINE HYDROCHLORIDE AND ISOSORBIDE DINITRATE 1 TABLET: 37.5; 2 TABLET, FILM COATED ORAL at 08:12

## 2019-12-30 RX ADMIN — PROMETHAZINE HYDROCHLORIDE 25 MG: 25 INJECTION INTRAMUSCULAR; INTRAVENOUS at 10:12

## 2019-12-30 RX ADMIN — POTASSIUM CHLORIDE 20 MEQ: 20 SOLUTION ORAL at 01:12

## 2019-12-30 RX ADMIN — CHLOROTHIAZIDE SODIUM 500 MG: 500 INJECTION, POWDER, LYOPHILIZED, FOR SOLUTION INTRAVENOUS at 12:12

## 2019-12-30 RX ADMIN — MORPHINE SULFATE 1 MG: 2 INJECTION, SOLUTION INTRAMUSCULAR; INTRAVENOUS at 08:12

## 2019-12-30 RX ADMIN — DOBUTAMINE IN DEXTROSE 2.5 MCG/KG/MIN: 200 INJECTION, SOLUTION INTRAVENOUS at 04:12

## 2019-12-30 RX ADMIN — FUROSEMIDE 25 MG/HR: 10 INJECTION, SOLUTION INTRAMUSCULAR; INTRAVENOUS at 06:12

## 2019-12-30 RX ADMIN — POTASSIUM CHLORIDE 20 MEQ: 20 SOLUTION ORAL at 04:12

## 2019-12-30 RX ADMIN — MORPHINE SULFATE 1 MG: 2 INJECTION, SOLUTION INTRAMUSCULAR; INTRAVENOUS at 02:12

## 2019-12-30 RX ADMIN — FERROUS SULFATE TAB EC 325 MG (65 MG FE EQUIVALENT) 325 MG: 325 (65 FE) TABLET DELAYED RESPONSE at 08:12

## 2019-12-30 RX ADMIN — WARFARIN SODIUM 8 MG: 3 TABLET ORAL at 04:12

## 2019-12-30 RX ADMIN — OXYMETAZOLINE HCL 2 SPRAY: 0.05 SPRAY NASAL at 10:12

## 2019-12-30 RX ADMIN — ASPIRIN 81 MG: 81 TABLET, COATED ORAL at 08:12

## 2019-12-30 RX ADMIN — FUROSEMIDE 25 MG/HR: 10 INJECTION, SOLUTION INTRAMUSCULAR; INTRAVENOUS at 08:12

## 2019-12-30 NOTE — PT/OT/SLP EVAL
Occupational Therapy   Evaluation    Name: Hamlet Terrell  MRN: [id number]  Admitting Diagnosis:  Acute on chronic combined systolic and diastolic congestive heart failure      Recommendations:     Discharge Recommendations: home with home health  Discharge Equipment Recommendations:  (TBD)  Barriers to discharge:  Decreased caregiver support    Assessment:     Hamlet Terrell is a 54 y.o. male with a medical diagnosis of Acute on chronic combined systolic and diastolic congestive heart failure. He presents with the following performance deficits affecting function: weakness, impaired endurance, impaired self care skills, impaired functional mobilty, impaired balance, gait instability, edema, impaired cardiopulmonary response to activity. Patient agreed to therapy evaluation and completed functional ambulation in room with SBA/CGA. Patient declined further activity at this time 2* SOB/fatigue. At this time, patient will continue to benefit from acute skilled therapy intervention to address deficits/underlying impairments and progress towards prior level of function. After discharge, patient will benefit from receiving home health therapy services to ensure safety and independence in the home environment.    Rehab Prognosis: Good; patient would benefit from acute skilled OT services to address these deficits and reach maximum level of function.       Plan:     Patient to be seen 3 x/week to address the above listed problems via self-care/home management, therapeutic activities, therapeutic exercises  · Plan of Care Expires: 01/29/20  · Plan of Care Reviewed with: patient    Subjective     Chief Complaint: SOB    Occupational Profile:  Living Environment: Patient lives alone in [facility] with 3 ANAID no handrail. Patient has a tub/shower combo.  Previous level of function: Independent with ADLs, IADLs, ambulation  Roles and Routines: drives; does not work  Equipment Used at Home:  none  Assistance upon Discharge: none; patient  reports he has a close friend but he doesn't want to bother him    Pain/Comfort:  · Pain Rating 1: (Patient did no c/o pain during session.)    Patients cultural, spiritual, Protestant conflicts given the current situation: no    Objective:     Communicated with: RN prior to session. Patient found up in chair with telemetry, peripheral IV upon OT entry to room. PT present for co-evaluation.    General Precautions: Standard, fall   Orthopedic Precautions:N/A   Braces: N/A     Occupational Performance:    Bed Mobility:    · Not assessed; patient up in chair at start of session and returned to chair at end of session    Functional Mobility/Transfers:  · Patient completed Sit <> Stand Transfer with stand by assistance  with  no assistive device   · Functional Mobility: Patient ambulated 12 feet forward with SBA and 12 feet backwards to chair with CGA using no AD.    Activities of Daily Living:  · Not assessed; Based on observations and underlying impairments, it is likely patient will need assist for LB dressing, LB bathing, tub/shower transfer, and toileting.    Cognitive/Visual Perceptual:  Cognitive/Psychosocial Skills:     -       Oriented to: Person, Place, Time and Situation   -       Follows Commands/attention:Follows multistep  commands  -       Communication: clear/fluent  -       Memory: No Deficits noted  -       Safety awareness/insight to disability: intact   -       Mood/Affect/Coping skills/emotional control: Cooperative and Flat affect    Physical Exam:  Edema:  -       B LE  Upper Extremity Range of Motion:     -       Right Upper Extremity: WNL  -       Left Upper Extremity: WNL  Upper Extremity Strength:    -       Right Upper Extremity: WNL  -       Left Upper Extremity: WNL   Strength:    -       Right Upper Extremity: WNL  -       Left Upper Extremity: WNL    AMPAC 6 Click ADL:  AMPAC Total Score: 18    Treatment & Education:  --Therapist provided facilitation and instruction of proper body  mechanics, energy conservation, and fall prevention strategies during tasks listed above.  --Instructed patient to sit in bedside chair daily to increase OOB/activity tolerance.   --Educated patient on OT POC and answered all questions within OT scope of practice.  --Whiteboard updated  Education:    Patient left up in chair with all lines intact and call button in reach    GOALS:   Multidisciplinary Problems     Occupational Therapy Goals        Problem: Occupational Therapy Goal    Goal Priority Disciplines Outcome Interventions   Occupational Therapy Goal     OT, PT/OT Ongoing, Progressing    Description:  Goals to be met by 1/13/20:    Patient will increase functional independence with ADLs by performing:    LE Dressing with Supervision.  Grooming while standing with Supervision.  Toileting from toilet with Supervision for hygiene and clothing management.   Toilet transfer to toilet with Supervision.                      History:     Past Medical History:   Diagnosis Date    Anticoagulant long-term use     Cardiomegaly     Chronic combined systolic and diastolic congestive heart failure     Coronary artery disease     Heart attack 2006    Hypertension     Hyperthyroidism, subclinical 1/2/2013    MI (myocardial infarction) 9/22/2013    MI in 2009      Paroxysmal atrial fibrillation     PE (pulmonary embolism) 1/1/2013    IN 2010     S/P ablation of atrial flutter 2008    Stroke 2009    no residual weaknesses       Past Surgical History:   Procedure Laterality Date    COLONOSCOPY N/A 12/2/2019    Procedure: COLONOSCOPY;  Surgeon: Scott Rosario MD;  Location: [address]);  Service: Endoscopy;  Laterality: N/A;    ESOPHAGOGASTRODUODENOSCOPY N/A 12/2/2019    Procedure: EGD (ESOPHAGOGASTRODUODENOSCOPY);  Surgeon: Scott Rosario MD;  Location: [address]);  Service: Endoscopy;  Laterality: N/A;    RADIOFREQUENCY ABLATION  01/08/2008    for atrial flutter       Time Tracking:     OT  Date of Treatment: 12/30/19  OT Start Time: 1258  OT Stop Time: 1306  OT Total Time (min): 8 min    Billable Minutes:Evaluation 8    Pat Christianson OT  12/30/2019

## 2019-12-30 NOTE — PHYSICIAN QUERY
PT Name: Hamlet Terrell  MR #: [id number]     Physician Query Form - Cardiomyopathy Clarification     CDS/: Debi Wray               Contact information:[email or web address]  This form is a permanent document in the medical record.     Query Date: December 30, 2019    By submitting this query, we are merely seeking further clarification of documentation to reflect the severity of illness of your patient. Please utilize your independent clinical judgment when addressing the question(s) below.    The Medical record reflects the following:     Indicators   Supporting Clinical Findings Location in Medical Record   x Cardiomyopathy, NICM, CM or similar term documented Cardiomyopathy  pn 12-25   x Acute/Chronic Illness · Acute on Chronic Combined Systolic and Diastolic Heart Failure  pn 12-25   x Echo, Radiology, and/or Heart Cath Findings · Last 2D echo Nov 2019 with EF 25%  pn 12-25    BiPAP/Intubation/Supplemental O2      SOB, MUÑOZ, Fatigue, Dizziness, LE Edema, Cyanosis, Chest Pain, Pulmonary HTN, Respiratory Distress, Hypoxia, etc.      Treatment                                 Medication     x Other · Acute on Chronic Combined Systolic and Diastolic Heart Failure  · Cardiomyopathy  · · CHF Pathway initiated  · · Last 2D echo Nov 2019 with EF 25%  · · BNP 261 and CXR with pulmonary edema and cardiomegaly  · · IV diuresis increased to Lasix 15 gtt and monitor response.   · · Goal diuresis 2-3L/day.  Home diuretic dose:  Lasix 80mg PO BID  · · BID BMP (with morning labs and at 4pm)  · · Strict I&Os with daily weights.  Dry weight 250lbs.  Admit weight 286lbs.  · · Cardiology co-management to see 12/26  · · Stop BB, okay to c/w BiDil  pn 12-25     Provider, please specify the type of cardiomyopathy:    [   ] Non-ischemic Dilated (congestive)    [   ] Other type of cardiomyopathy (please specify):    [ x  ]  Clinically Undetermined       Please document in your progress notes daily  for the duration of treatment until resolved, and include in your discharge summary.

## 2019-12-30 NOTE — PLAN OF CARE
PT evaluation complete and appropriate goals established.    Eboni Pittman, PT, DPT   2019  [phone]    Problem: Physical Therapy Goal  Goal: Physical Therapy Goal  Description  Goals to be met by: 2020     Patient will increase functional independence with mobility by performin. Supine to sit with Supervision  2. Sit to stand transfer with Supervision  3. Gait  x 50 feet with Supervision using AD as needed.   4. Ascend/descend 3 stair without Handrails Contact Guard Assistance.   5. .Lower extremity exercise program x15 reps, with supervision, in order to increase LE strength and (I) with functional mobility.      Outcome: Ongoing, Progressing

## 2019-12-30 NOTE — CHAPLAIN
"Visited w/pt about 10 min; he said he's in a lot of pain; wincing; chart said "no Islam" and I read the notes on the difficulties he's given the nurses re: meds, etc. He was cold, so I covered him up with a blanket. He was receptive when I taught him deep breathing, in through nose, out through mouth, combined with imagery-- things he can visualize that bring him comfort and peace. He actually did the breathing, eyes closed, imagery for about 5 minutes straight w/o interruption nor opening his eyes, with me softly guiding him. He seemed more relaxed afterwards. I said I'd keep him in my personal prayers and he said thank you.  "

## 2019-12-30 NOTE — PLAN OF CARE
OT giovani completed and goals established.  CHRISTINA Cuello/L  Occupational Therapy  Pager #: [phone]  12/30/2019    Problem: Occupational Therapy Goal  Goal: Occupational Therapy Goal  Description  Goals to be met by 1/13/20:    Patient will increase functional independence with ADLs by performing:    LE Dressing with Supervision.  Grooming while standing with Supervision.  Toileting from toilet with Supervision for hygiene and clothing management.   Toilet transfer to toilet with Supervision.     Outcome: Ongoing, Progressing

## 2019-12-30 NOTE — PT/OT/SLP EVAL
"Physical Therapy Evaluation    Patient Name:  Hamlet Terrell   MRN:  [id number]    Recommendations:     Discharge Recommendations:  home health PT   Discharge Equipment Recommendations: (TBD)   Barriers to discharge: Inaccessible home and Decreased caregiver support (3 ANAID; lives alone)    Assessment:     Hamlet Terrell is a 54 y.o. male admitted with a medical diagnosis of Acute on chronic combined systolic and diastolic congestive heart failure.  He presents with the following impairments/functional limitations:  weakness, impaired functional mobilty, impaired endurance, gait instability, impaired balance, impaired self care skills, impaired cardiopulmonary response to activity, pain, edema, decreased safety awareness. Pt completed functional mobility without physical assist. Ambulation distance limited this date 2* impaired endurance and SOB upon exertion. Pt would continue to benefit from skilled acute PT in order to address current deficits and progress functional mobility.     Rehab Prognosis: Good; patient would benefit from acute skilled PT services to address these deficits and reach maximum level of function.    Recent Surgery: * No surgery found *      Plan:     During this hospitalization, patient to be seen 3 x/week to address the identified rehab impairments via gait training, therapeutic activities, therapeutic exercises, neuromuscular re-education and progress toward the following goals:    · Plan of Care Expires:  01/28/20    Subjective     Chief Complaint: SOB upon exertion   Patient/Family Comments/goals: "Let's go walk."  Pain/Comfort:  · Pain Rating 1: 0/10    Patients cultural, spiritual, Yarsani conflicts given the current situation: no    Living Environment:  Pt lives alone in a 1SH with 3 ANAID, no handrails.   Prior to admission, patients level of function was independent.  Equipment used at home: none.  DME owned (not currently used): none.  No assistance available upon d/c.    Objective: "     Communicated with RN prior to session.  Patient found up in chair with peripheral IV, telemetry  upon PT entry to room.    General Precautions: Standard, fall   Orthopedic Precautions:N/A   Braces: N/A     Exams:  · Cognitive Exam:  Patient is oriented to Person, Place, Time and Situation  · Skin Integrity/Edema:      · -       Skin integrity: blisters noted BLE  · -       Edema: BLE  · RLE ROM: WFL  · RLE Strength: WFL based on functional mobility; not formally tested in order to avoid pt agitation   · LLE ROM: WFL  · LLE Strength: WFL based on functional mobility; not formally tested in order to avoid pt agitation     Functional Mobility:  · Transfers:     · Sit to Stand:  stand by assistance with no AD from bedside chair    · Gait: 12 ft. forward with SBA and pt pushing IV pole; 12 ft. backwards with CGA and pt pushing IV pole  · Standing rest break between gait trials  · Further distance limited 2* SOB upon exertion  · demo'd decreased jordan, decreased step length, impaired weight-shifting ability, and mild instability       Therapeutic Activities and Exercises:   Pt educated on role of PT and PT POC. Pt verbalized understanding. Pt familiar with acute therapy services from previous admissions.     AM-PAC 6 CLICK MOBILITY  Total Score:17     Patient left up in chair with all lines intact and call button in reach. RN notified.     GOALS:   Multidisciplinary Problems     Physical Therapy Goals        Problem: Physical Therapy Goal    Goal Priority Disciplines Outcome Goal Variances Interventions   Physical Therapy Goal     PT, PT/OT Ongoing, Progressing     Description:  Goals to be met by: 2020     Patient will increase functional independence with mobility by performin. Supine to sit with Supervision  2. Sit to stand transfer with Supervision  3. Gait  x 50 feet with Supervision using AD as needed.   4. Ascend/descend 3 stair without Handrails Contact Guard Assistance.   5. .Lower extremity  exercise program x15 reps, with supervision, in order to increase LE strength and (I) with functional mobility.                       History:     Past Medical History:   Diagnosis Date    Anticoagulant long-term use     Cardiomegaly     Chronic combined systolic and diastolic congestive heart failure     Coronary artery disease     Heart attack 2006    Hypertension     Hyperthyroidism, subclinical 1/2/2013    MI (myocardial infarction) 9/22/2013    MI in 2009      Paroxysmal atrial fibrillation     PE (pulmonary embolism) 1/1/2013    IN 2010     S/P ablation of atrial flutter 2008    Stroke 2009    no residual weaknesses       Past Surgical History:   Procedure Laterality Date    COLONOSCOPY N/A 12/2/2019    Procedure: COLONOSCOPY;  Surgeon: Scott Rosario MD;  Location: [facility] ENDO (2ND FLR);  Service: Endoscopy;  Laterality: N/A;    ESOPHAGOGASTRODUODENOSCOPY N/A 12/2/2019    Procedure: EGD (ESOPHAGOGASTRODUODENOSCOPY);  Surgeon: Scott Rosario MD;  [facility]  Service: Endoscopy;  Laterality: N/A;    RADIOFREQUENCY ABLATION  01/08/2008    for atrial flutter       Time Tracking:     PT Received On: 12/30/19  PT Start Time: 1258     PT Stop Time: 1306  PT Total Time (min): 8 min     Billable Minutes: Evaluation 8  (co-eval with OT)    Eboni Pittman, PT, DPT   12/30/2019  [phone]

## 2019-12-30 NOTE — PLAN OF CARE
Pt AAO and VSS. Pt with one occurrence of nose bleed. Pt with  and Lasix gtts. Pt with flat affect and uncooperative. Pt needed convincing to take medications. Pt educated on fall risk overnight,pt remained free from falls/trauma/injury. Denies chest pain, SOB, palpitations, dizziness, pain, or discomfort. Plan of care reviewed with pt, all questions answered. Bed locked in lowest position, call bell within reach, no acute distress noted, will continue to monitor.

## 2019-12-30 NOTE — PROGRESS NOTES
Progress Note  Hospital Medicine    Provider team:    [facility] HOSP MED C  [facility] CARDIOLOGY TEAM A - CARDIOLOGY CONSULT STEPDOWN  Admit Date: 12/24/2019  Encounter Date: 12/30/2019     SUBJECTIVE:     Follow-up Visit for: Acute on chronic combined systolic and diastolic congestive heart failure    HPI (See H&P for complete P,F,SHx):  54 y.o. male with chronic combined systolic and diastolic heart failure (EF 25%), afib on Coumadin, CAD, CVA, and history of PE who presents to the ED for evaluation of shortness of breath and edema.  He was just admitted to Hospital Medicine from 11/24-12/8 for a CHF exacerbation.  During that admission, he was evaluated by Cardiology who started him on Dobutamine and Diuril.  He was resistant to HTS evaluation during that stay, which included recommendations for LVAD, transplant, and ICD.  He was discharged home on Lasix 80mg PO BID.  He endorses compliance with his diuretic regimen, but despite this and following a low salt diet, he has continued to gain weight, have lower extremity and abdominal swelling, as well as shortness of breath.  He is so short of breath that he has to sleep in a chair, and has been unable to sleep because of his breathing.  He denies any chest pain.  He does have a cough that is mostly dry, but occasionally productive of blood tinged sputum.  He was discharged weight a weight of 259lbs, and claims that is around his dry weight.  He is currently 286lbs.       Upon arrival to the ED, vitals were /83, HR 88, RR 22.  Labs showed Hemoglobin 7.3, INR 1.5, Creatinine 1.8, Alk Phos 278,  with Trop 0.128.  CXR showed cardiomegaly and pulmonary edema, along with a masslike opacity.  CT chest was attempted to be ordered to evaluate the mass, but he was unable to lie flat to get it evaluated.  He was given Lasix 80mg IV and was admitted to Hospital Medicine for further management.    TTE 11/27/2019  · Severely decreased left ventricular systolic function.   · The  estimated ejection fraction is 25%  · Concentric left ventricular hypertrophy.  · Global hypokinetic wall motion.  · Moderate right ventricular enlargement.  · Moderately reduced right ventricular systolic function.  · Severe biatrial enlargement.  · Moderate mitral regurgitation.  · Moderate tricuspid regurgitation.  · The estimated PA systolic pressure is 40 mm Hg  · Elevated central venous pressure (15 mm Hg).  · Atrial fibrillation observed.    Interval history:  SOB improved today. Still in a bad mood. C/o epistaxis. Afrin frequency increased. Hb improved to 7.1 w/o intervention. Net neg 2.3 in past 24 hours, down 4 lb to 288 lb. Estimated dry weight 232 lb. Continuing Lasix gtt with Diuril once for augmentation.     Wt Readings from Last 30 Encounters:   12/30/19 130.7 kg (288 lb 0.5 oz)   12/07/19 117.6 kg (259 lb 4.2 oz)   10/23/19 108.9 kg (240 lb)   09/09/19 108.9 kg (240 lb)   08/04/19 105.2 kg (232 lb)   07/18/19 108.2 kg (238 lb 8.6 oz)   06/20/19 118.9 kg (262 lb 2 oz)   05/21/19 121.1 kg (267 lb)   05/16/19 116.3 kg (256 lb 6.3 oz)   04/20/19 127.5 kg (281 lb 1.4 oz)   01/31/19 113.4 kg (250 lb)   11/04/18 123.1 kg (271 lb 6.2 oz)   08/31/18 119.6 kg (263 lb 10.7 oz)   07/28/18 113.4 kg (250 lb)   07/10/18 120.7 kg (266 lb)   05/03/18 117.9 kg (259 lb 14.8 oz)   03/30/18 117.9 kg (260 lb)   02/21/18 117.9 kg (260 lb)   01/12/18 113.4 kg (250 lb)   12/10/17 117.9 kg (260 lb)   11/11/17 113.9 kg (251 lb)   11/01/17 111.8 kg (246 lb 7.6 oz)   10/26/17 118.5 kg (261 lb 3.2 oz)   08/13/17 117.9 kg (260 lb)   08/12/17 117.9 kg (260 lb)   05/10/17 117.9 kg (260 lb)   04/05/17 108.4 kg (239 lb 1.4 oz)   02/01/17 108.9 kg (240 lb)   12/15/16 119.4 kg (263 lb 3.7 oz)   08/26/16 112.5 kg (248 lb)         Review of Systems:  Review of Systems   Constitutional: Negative for chills and fever.   HENT: Negative for congestion and sore throat.    Eyes: Negative for photophobia, pain and discharge.   Respiratory:  Positive for shortness of breath. Negative for cough, hemoptysis and sputum production.    Cardiovascular: Positive for orthopnea, leg swelling and PND. Negative for chest pain and palpitations.   Gastrointestinal: Negative for abdominal pain, diarrhea, nausea and vomiting.   Genitourinary: Negative for dysuria and urgency.   Musculoskeletal: Negative for myalgias and neck pain.   Skin: Negative for itching and rash.   Neurological: Negative for sensory change, focal weakness and headaches.   Endo/Heme/Allergies: Negative for polydipsia. Does not bruise/bleed easily.   Psychiatric/Behavioral: Negative for depression and suicidal ideas.     Past Medical History:   Diagnosis Date    Anticoagulant long-term use     Cardiomegaly     Chronic combined systolic and diastolic congestive heart failure     Coronary artery disease     Heart attack 2006    Hypertension     Hyperthyroidism, subclinical 1/2/2013    MI (myocardial infarction) 9/22/2013    MI in 2009      Paroxysmal atrial fibrillation     PE (pulmonary embolism) 1/1/2013    IN 2010     S/P ablation of atrial flutter 2008    Stroke 2009    no residual weaknesses     Social History     Socioeconomic History    Marital status: Single     Spouse name: Not on file    Number of children: Not on file    Years of education: Not on file    Highest education level: Not on file   Occupational History    Not on file   Social Needs    Financial resource strain: Not on file    Food insecurity:     Worry: Not on file     Inability: Not on file    Transportation needs:     Medical: Not on file     Non-medical: Not on file   Tobacco Use    Smoking status: Never Smoker    Smokeless tobacco: Never Used   Substance and Sexual Activity    Alcohol use: No    Drug use: No    Sexual activity: Never   Lifestyle    Physical activity:     Days per week: Not on file     Minutes per session: Not on file    Stress: Not on file   Relationships    Social connections:  "    Talks on phone: Not on file     Gets together: Not on file     Attends Taoism service: Not on file     Active member of club or organization: Not on file     Attends meetings of clubs or organizations: Not on file     Relationship status: Not on file   Other Topics Concern    Not on file   Social History Narrative    Not on file       OBJECTIVE:       Intake/Output Summary (Last 24 hours) at 12/30/2019 1150  Last data filed at 12/30/2019 1100  Gross per 24 hour   Intake 895 ml   Output 5250 ml   Net -4355 ml     Vital Signs Range (Last 24H):  Temp:  [97.7 °F (36.5 °C)-98.6 °F (37 °C)]   Pulse:  []   Resp:  [16-18]   BP: (115-138)/(58-65)   SpO2:  [91 %-98 %]   Body mass index is 42.53 kg/m².    Objective:  General Appearance:  Comfortable, well-appearing, in no acute distress and not in pain.    Vital signs: (most recent): Blood pressure (!) 126/58, pulse 108, temperature 98.3 °F (36.8 °C), temperature source Oral, resp. rate 18, height 5' 9" (1.753 m), weight 130.7 kg (288 lb 0.5 oz), SpO2 (!) 91 %.  No fever.    Output: Producing urine and producing stool.    HEENT: Normal HEENT exam.  (+JVD)    Lungs:  Normal effort.  Breath sounds clear to auscultation.  He is not in respiratory distress.  There are rales.  No wheezes.    Heart: Normal rate.  Regular rhythm.  S1 normal and S2 normal.  Positive for murmur.    Chest: Symmetric chest wall expansion.   Abdomen: Abdomen is soft.  Bowel sounds are normal.   There is no abdominal tenderness.     Extremities: Normal range of motion.  There is venous stasis and dependent edema.  There is no deformity, effusion or local swelling.    Pulses: Distal pulses are intact.    Neurological: Patient is alert and oriented to person, place and time.  Normal strength.    Pupils:  Pupils are equal, round, and reactive to light.    Skin:  Warm and dry.      Medications:  Medication list was reviewed in EPIC and changes noted under Assessment/Plan and " MAR.    Laboratory:  Recent Labs     12/30/19  1018   WBC 5.87   RBC 2.92*   HGB 7.1*   HCT 24.8*      MCV 85   MCH 24.3*   MCHC 28.6*   GRAN 63.8  3.7   LYMPH 9.7*  0.6*   MONO 16.5*  1.0   EOS 0.5      Recent Labs     12/30/19  1018   *      K 3.4*   CL 95   CO2 33*   BUN 95*   CREATININE 3.0*   CALCIUM 9.5   ANIONGAP 13   MG 2.4   PHOS 4.4     Prior records reviewed.    ECHO reviewed:  · Severely decreased left ventricular systolic function. The estimated ejection fraction is 25%  · Concentric left ventricular hypertrophy.  · Global hypokinetic wall motion.  · Moderate right ventricular enlargement.  · Moderately reduced right ventricular systolic function.  · Severe biatrial enlargement.  · Moderate mitral regurgitation.  · Moderate tricuspid regurgitation.  · The estimated PA systolic pressure is 40 mm Hg  · Elevated central venous pressure (15 mm Hg).  · Atrial fibrillation observed.      Imaging reviewed  Imaging Results          CT Chest Without Contrast (No Result on File)                X-Ray Chest AP Portable (Final result)  Result time 12/24/19 19:09:07    Final result by Zeb Hirsch MD (12/24/19 19:09:07)                 Impression:      Masslike opacity overlying the right lower lung zone, similar to prior exam.  Right pleural fluid not excluded.  Chest CT recommended for further evaluation of right lung masslike opacity.    Patchy bilateral pulmonary opacities, similar to prior exam.    Cardiomegaly.    Electronically signed by resident: Cristofer Reddy  Date:    12/24/2019  Time:    18:48    Electronically signed by: Zeb Hirsch MD  Date:    12/24/2019  Time:    19:09             Narrative:    EXAMINATION:  XR CHEST AP PORTABLE    CLINICAL HISTORY:  CHF;    TECHNIQUE:  Single frontal view of the chest was performed.    COMPARISON:  Chest radiograph 12/03/2019    FINDINGS:  Cardiac leads overlie the chest wall.    Redemonstration of extensive bilateral patchy opacities, more  prominent on the right with a focal area of masslike opacity in the right lower lung zone.  No significant change from prior exam.  Left costophrenic angle is visible and right sided pleural fluid not excluded noting presence of opacification.  No pneumothorax.    The cardiac silhouette is enlarged.  Hilar and mediastinal contours are unchanged.    Bones are intact.                                  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 [I13.0]  Yes    Elevated alkaline phosphatase level [R74.8]  Yes    Coronary artery disease [I25.10]  Yes     Chronic    Stage 3 chronic kidney disease [N18.3]  Yes    Subtherapeutic international normalized ratio (INR) [R79.1]  Yes    Personal history of cerebral embolism [Z86.79]  Not Applicable    Personal history of venous thrombosis and embolism [Z86.718]  Not Applicable    Personal history of MI (myocardial infarction) [I25.2]  Not Applicable    Essential hypertension [I10]  Yes     Chronic    Atrial fibrillation, chronic [I48.20]  Yes     Chronic    Chronic anticoagulation [Z79.01]  Not Applicable     Chronic    Cardiomyopathy [I42.9]  Yes     Chronic      Resolved Hospital Problems   No resolved problems to display.      Acute on Chronic Combined Systolic and Diastolic Heart Failure  Cardiomyopathy  · CHF Pathway initiated  · Last 2D echo Nov 2019 with EF 25%  ·  and CXR with pulmonary edema and cardiomegaly  · Cont Lasix 25 gtt.  2.5. Diuril 500 once. Pt continues to report disinterest in home dobutamine or palliative care. Pt with extremely poor insight into his disease. States he'll consider outpt Lasix infusions only if there's a handicap spot for him to park in.  · Goal diuresis 2-3L/day.  Home diuretic dose:  Lasix 80mg PO BID  · BID BMP (with morning labs and at  4pm)  · Strict I&Os with daily weights.  Dry weight 232 lb?  Admit weight 286lbs.  · Hold BB, okay to c/w BiDil     Chronic AFib  Long Term Use of Anticoagulants  Subtherapeutic INR  · Chronic issue  · HR in 100-120's. Resume BB  · Continue Warfarin at increased dose - Pharmacy consulted for dosing management     Essential HTN  · Chronic and stable  · Continue Bidil BID  · Resume BB     CAD  History of MI  · Chronic and stable  · Continue Aspirin 81mg PO daily  · Continue Bidil BID  · Resume BB     Anemia  · Hgb > 7  · Received 5 units PRBCs on prior hospital admission  · Continue PO iron  · Type and screen obtained  · Monitor for bleeding/need for transfusion     History of VTE  History of Stroke  · Chronic and stable  · Continue Aspirin 81mg PO daily  · Continue Warfarin as above     Stage 3 CKD  · Chronic and stable  · Monitor with diuresis     Elevated Alk Phos  · Chronic and stable  · Monitor     Benign Hypertensive Heart and Kidney Disease with HF and CKD  · As above     Abnormal CT  · Needs CT chest when able to lie flat or outpt to evaluate masslike opacity seen on CXR     Epistaxis  - likely from o2; use humidified o2 only  - afrin ordered  - avoid nose blowing or nose picking     Diet:  Low Sodium, 1.2L fluid restriction  VTE PPx:  Warfarin  Goals of Care:  Full     High Risk Conditions:   Patient is currently on drug therapy requiring intensive monitoring for toxicity: Lasix gtt     Anticipated discharge date and disposition:   Pending diuresis    Antoinette Goins MD  [facility]

## 2019-12-30 NOTE — PLAN OF CARE
12/30/19 1516   Discharge Assessment   Assessment Type Discharge Planning Reassessment   Confirmed/corrected address and phone number on facesheet? Yes   Assessment information obtained from? Patient;Medical Record   Current cognitive status: Alert/Oriented   Lives With alone   Equipment Currently Used at Home other (see comments)  (TBD)   Do you have any problems affording any of your prescribed medications? No   Is the patient taking medications as prescribed? yes   Does the patient have transportation home? Yes   Transportation Anticipated family or friend will provide   Discharge Plan A Home   Discharge Plan B Home   DME Needed Upon Discharge  other (see comments)  (TBD)   Patient/Family in Agreement with Plan yes

## 2019-12-31 LAB
ALBUMIN SERPL BCP-MCNC: 3.4 G/DL (ref 3.5–5.2)
ALP SERPL-CCNC: 270 U/L (ref 55–135)
ALT SERPL W/O P-5'-P-CCNC: 16 U/L (ref 10–44)
ANION GAP SERPL CALC-SCNC: 15 MMOL/L (ref 8–16)
ANION GAP SERPL CALC-SCNC: 16 MMOL/L (ref 8–16)
AST SERPL-CCNC: 23 U/L (ref 10–40)
BASOPHILS # BLD AUTO: 0.05 K/UL (ref 0–0.2)
BASOPHILS NFR BLD: 0.8 % (ref 0–1.9)
BILIRUB SERPL-MCNC: 1.2 MG/DL (ref 0.1–1)
BUN SERPL-MCNC: 94 MG/DL (ref 6–20)
BUN SERPL-MCNC: 95 MG/DL (ref 6–20)
CALCIUM SERPL-MCNC: 9.7 MG/DL (ref 8.7–10.5)
CALCIUM SERPL-MCNC: 9.7 MG/DL (ref 8.7–10.5)
CHLORIDE SERPL-SCNC: 92 MMOL/L (ref 95–110)
CHLORIDE SERPL-SCNC: 95 MMOL/L (ref 95–110)
CO2 SERPL-SCNC: 31 MMOL/L (ref 23–29)
CO2 SERPL-SCNC: 32 MMOL/L (ref 23–29)
CREAT SERPL-MCNC: 2.8 MG/DL (ref 0.5–1.4)
CREAT SERPL-MCNC: 3 MG/DL (ref 0.5–1.4)
DIFFERENTIAL METHOD: ABNORMAL
EOSINOPHIL # BLD AUTO: 0.6 K/UL (ref 0–0.5)
EOSINOPHIL NFR BLD: 9.4 % (ref 0–8)
ERYTHROCYTE [DISTWIDTH] IN BLOOD BY AUTOMATED COUNT: 24.1 % (ref 11.5–14.5)
EST. GFR  (AFRICAN AMERICAN): 26.2 ML/MIN/1.73 M^2
EST. GFR  (AFRICAN AMERICAN): 28.5 ML/MIN/1.73 M^2
EST. GFR  (NON AFRICAN AMERICAN): 22.7 ML/MIN/1.73 M^2
EST. GFR  (NON AFRICAN AMERICAN): 24.6 ML/MIN/1.73 M^2
GLUCOSE SERPL-MCNC: 105 MG/DL (ref 70–110)
GLUCOSE SERPL-MCNC: 132 MG/DL (ref 70–110)
HCT VFR BLD AUTO: 25.3 % (ref 40–54)
HGB BLD-MCNC: 7.1 G/DL (ref 14–18)
IMM GRANULOCYTES # BLD AUTO: 0.01 K/UL (ref 0–0.04)
IMM GRANULOCYTES NFR BLD AUTO: 0.2 % (ref 0–0.5)
INR PPP: 2.4 (ref 0.8–1.2)
LYMPHOCYTES # BLD AUTO: 0.8 K/UL (ref 1–4.8)
LYMPHOCYTES NFR BLD: 11.5 % (ref 18–48)
MAGNESIUM SERPL-MCNC: 2.3 MG/DL (ref 1.6–2.6)
MCH RBC QN AUTO: 24.1 PG (ref 27–31)
MCHC RBC AUTO-ENTMCNC: 28.1 G/DL (ref 32–36)
MCV RBC AUTO: 86 FL (ref 82–98)
MONOCYTES # BLD AUTO: 1.3 K/UL (ref 0.3–1)
MONOCYTES NFR BLD: 19.2 % (ref 4–15)
NEUTROPHILS # BLD AUTO: 3.9 K/UL (ref 1.8–7.7)
NEUTROPHILS NFR BLD: 58.9 % (ref 38–73)
NRBC BLD-RTO: 0 /100 WBC
PHOSPHATE SERPL-MCNC: 4.2 MG/DL (ref 2.7–4.5)
PLATELET # BLD AUTO: 234 K/UL (ref 150–350)
PMV BLD AUTO: 9.8 FL (ref 9.2–12.9)
POTASSIUM SERPL-SCNC: 3.5 MMOL/L (ref 3.5–5.1)
POTASSIUM SERPL-SCNC: 4.1 MMOL/L (ref 3.5–5.1)
PROT SERPL-MCNC: 8.8 G/DL (ref 6–8.4)
PROTHROMBIN TIME: 22.4 SEC (ref 9–12.5)
RBC # BLD AUTO: 2.95 M/UL (ref 4.6–6.2)
SODIUM SERPL-SCNC: 140 MMOL/L (ref 136–145)
SODIUM SERPL-SCNC: 141 MMOL/L (ref 136–145)
WBC # BLD AUTO: 6.52 K/UL (ref 3.9–12.7)

## 2019-12-31 PROCEDURE — 20600001 HC STEP DOWN PRIVATE ROOM

## 2019-12-31 PROCEDURE — 36415 COLL VENOUS BLD VENIPUNCTURE: CPT

## 2019-12-31 PROCEDURE — 63600175 PHARM REV CODE 636 W HCPCS: Performed by: HOSPITALIST

## 2019-12-31 PROCEDURE — 85025 COMPLETE CBC W/AUTO DIFF WBC: CPT

## 2019-12-31 PROCEDURE — 80048 BASIC METABOLIC PNL TOTAL CA: CPT

## 2019-12-31 PROCEDURE — 25000003 PHARM REV CODE 250: Performed by: HOSPITALIST

## 2019-12-31 PROCEDURE — 99233 SBSQ HOSP IP/OBS HIGH 50: CPT | Mod: ,,, | Performed by: HOSPITALIST

## 2019-12-31 PROCEDURE — 84100 ASSAY OF PHOSPHORUS: CPT

## 2019-12-31 PROCEDURE — 80053 COMPREHEN METABOLIC PANEL: CPT

## 2019-12-31 PROCEDURE — 85610 PROTHROMBIN TIME: CPT

## 2019-12-31 PROCEDURE — 99233 PR SUBSEQUENT HOSPITAL CARE,LEVL III: ICD-10-PCS | Mod: ,,, | Performed by: HOSPITALIST

## 2019-12-31 PROCEDURE — 83735 ASSAY OF MAGNESIUM: CPT

## 2019-12-31 RX ORDER — METHOCARBAMOL 500 MG/1
500 TABLET, FILM COATED ORAL 3 TIMES DAILY PRN
Status: DISCONTINUED | OUTPATIENT
Start: 2019-12-31 | End: 2020-01-18 | Stop reason: HOSPADM

## 2019-12-31 RX ORDER — OXYCODONE HYDROCHLORIDE 5 MG/1
5 TABLET ORAL ONCE AS NEEDED
Status: COMPLETED | OUTPATIENT
Start: 2019-12-31 | End: 2019-12-31

## 2019-12-31 RX ORDER — POTASSIUM CHLORIDE 20 MEQ/15ML
20 SOLUTION ORAL
Status: COMPLETED | OUTPATIENT
Start: 2019-12-31 | End: 2019-12-31

## 2019-12-31 RX ORDER — FUROSEMIDE 10 MG/ML
80 INJECTION INTRAMUSCULAR; INTRAVENOUS ONCE
Status: COMPLETED | OUTPATIENT
Start: 2019-12-31 | End: 2019-12-31

## 2019-12-31 RX ORDER — IBUPROFEN 400 MG/1
400 TABLET ORAL ONCE
Status: COMPLETED | OUTPATIENT
Start: 2019-12-31 | End: 2019-12-31

## 2019-12-31 RX ADMIN — HYDRALAZINE HYDROCHLORIDE AND ISOSORBIDE DINITRATE 1 TABLET: 37.5; 2 TABLET, FILM COATED ORAL at 08:12

## 2019-12-31 RX ADMIN — METHOCARBAMOL TABLETS 500 MG: 500 TABLET, COATED ORAL at 08:12

## 2019-12-31 RX ADMIN — FUROSEMIDE 30 MG/HR: 10 INJECTION, SOLUTION INTRAMUSCULAR; INTRAVENOUS at 08:12

## 2019-12-31 RX ADMIN — FUROSEMIDE 30 MG/HR: 10 INJECTION, SOLUTION INTRAMUSCULAR; INTRAVENOUS at 01:12

## 2019-12-31 RX ADMIN — ASPIRIN 81 MG: 81 TABLET, COATED ORAL at 08:12

## 2019-12-31 RX ADMIN — WARFARIN SODIUM 8 MG: 3 TABLET ORAL at 04:12

## 2019-12-31 RX ADMIN — FERROUS SULFATE TAB EC 325 MG (65 MG FE EQUIVALENT) 325 MG: 325 (65 FE) TABLET DELAYED RESPONSE at 08:12

## 2019-12-31 RX ADMIN — DOBUTAMINE IN DEXTROSE 2.5 MCG/KG/MIN: 200 INJECTION, SOLUTION INTRAVENOUS at 08:12

## 2019-12-31 RX ADMIN — POTASSIUM CHLORIDE 20 MEQ: 20 SOLUTION ORAL at 10:12

## 2019-12-31 RX ADMIN — FUROSEMIDE 25 MG/HR: 10 INJECTION, SOLUTION INTRAMUSCULAR; INTRAVENOUS at 04:12

## 2019-12-31 RX ADMIN — FUROSEMIDE 80 MG: 10 INJECTION, SOLUTION INTRAMUSCULAR; INTRAVENOUS at 10:12

## 2019-12-31 RX ADMIN — METHOCARBAMOL TABLETS 500 MG: 500 TABLET, COATED ORAL at 11:12

## 2019-12-31 RX ADMIN — OXYCODONE HYDROCHLORIDE 5 MG: 5 TABLET ORAL at 10:12

## 2019-12-31 RX ADMIN — IBUPROFEN 400 MG: 400 TABLET, FILM COATED ORAL at 01:12

## 2019-12-31 RX ADMIN — POTASSIUM CHLORIDE 20 MEQ: 20 SOLUTION ORAL at 11:12

## 2019-12-31 RX ADMIN — POTASSIUM CHLORIDE 20 MEQ: 20 SOLUTION ORAL at 01:12

## 2019-12-31 RX ADMIN — Medication 6 MG: at 08:12

## 2019-12-31 NOTE — PLAN OF CARE
Plan of care discussed with patient. Patient is free of fall/trauma/injury. Denies CP, SOB.  and lasix gtts maintained per order. Lasix gtt increased per order. PRN pain meds administered per order. All questions addressed. Will continue to monitor

## 2019-12-31 NOTE — PROGRESS NOTES
"Pt called RN to room. Upon entering the room pt had clamed off  and lasix gtts stating "you gotta turn this machine off. I think its these medicines causing this pain." RN restarted gtts and notified MD. RN educated pt that it was not the medicines causing the pain. Will continue to monitor.   "

## 2019-12-31 NOTE — PROGRESS NOTES
"Pt refusing midnight vitals from PCT and RN. Pt reminded how its important to his treatment and POC, but Pt just said "no" with the blanket over his head and stated he was going to sleep. MD rand notified.   "

## 2019-12-31 NOTE — PROGRESS NOTES
Progress Note  Hospital Medicine    Provider team[place] [facility]  Admit Date: 12/24/2019  Encounter Date: 12/31/2019     SUBJECTIVE:     Follow-up Visit for: Acute on chronic combined systolic and diastolic congestive heart failure    HPI (See H&P for complete P,F,SHx):  54 y.o. male with chronic combined systolic and diastolic heart failure (EF 25%), afib on Coumadin, CAD, CVA, and history of PE who presents to the ED for evaluation of shortness of breath and edema.  He was just admitted to Hospital Medicine from 11/24-12/8 for a CHF exacerbation.  During that admission, he was evaluated by Cardiology who started him on Dobutamine and Diuril.  He was resistant to HTS evaluation during that stay, which included recommendations for LVAD, transplant, and ICD.  He was discharged home on Lasix 80mg PO BID.  He endorses compliance with his diuretic regimen, but despite this and following a low salt diet, he has continued to gain weight, have lower extremity and abdominal swelling, as well as shortness of breath.  He is so short of breath that he has to sleep in a chair, and has been unable to sleep because of his breathing.  He denies any chest pain.  He does have a cough that is mostly dry, but occasionally productive of blood tinged sputum.  He was discharged weight a weight of 259lbs, and claims that is around his dry weight.  He is currently 286lbs.       Upon arrival to the ED, vitals were /83, HR 88, RR 22.  Labs showed Hemoglobin 7.3, INR 1.5, Creatinine 1.8, Alk Phos 278,  with Trop 0.128.  CXR showed cardiomegaly and pulmonary edema, along with a masslike opacity.  CT chest was attempted to be ordered to evaluate the mass, but he was unable to lie flat to get it evaluated.  He was given Lasix 80mg IV and was admitted to Hospital Medicine for further management.    TTE 11/27/2019  · Severely decreased left ventricular systolic function.   · The estimated ejection fraction is 25%  · Concentric left  ventricular hypertrophy.  · Global hypokinetic wall motion.  · Moderate right ventricular enlargement.  · Moderately reduced right ventricular systolic function.  · Severe biatrial enlargement.  · Moderate mitral regurgitation.  · Moderate tricuspid regurgitation.  · The estimated PA systolic pressure is 40 mm Hg  · Elevated central venous pressure (15 mm Hg).  · Atrial fibrillation observed.    Interval history:  Has covers over his head again this AM. Denies complaints other than being sleepy. Net neg 3 L in past 24 hours. Estimated dry weight 232 lb. Continuing Lasix gtt at increased rate of 30 mg/hr with 80 mg bolus.     Wt Readings from Last 30 Encounters:   12/30/19 130.7 kg (288 lb 0.5 oz)   12/07/19 117.6 kg (259 lb 4.2 oz)   10/23/19 108.9 kg (240 lb)   09/09/19 108.9 kg (240 lb)   08/04/19 105.2 kg (232 lb)   07/18/19 108.2 kg (238 lb 8.6 oz)   06/20/19 118.9 kg (262 lb 2 oz)   05/21/19 121.1 kg (267 lb)   05/16/19 116.3 kg (256 lb 6.3 oz)   04/20/19 127.5 kg (281 lb 1.4 oz)   01/31/19 113.4 kg (250 lb)   11/04/18 123.1 kg (271 lb 6.2 oz)   08/31/18 119.6 kg (263 lb 10.7 oz)   07/28/18 113.4 kg (250 lb)   07/10/18 120.7 kg (266 lb)   05/03/18 117.9 kg (259 lb 14.8 oz)   03/30/18 117.9 kg (260 lb)   02/21/18 117.9 kg (260 lb)   01/12/18 113.4 kg (250 lb)   12/10/17 117.9 kg (260 lb)   11/11/17 113.9 kg (251 lb)   11/01/17 111.8 kg (246 lb 7.6 oz)   10/26/17 118.5 kg (261 lb 3.2 oz)   08/13/17 117.9 kg (260 lb)   08/12/17 117.9 kg (260 lb)   05/10/17 117.9 kg (260 lb)   04/05/17 108.4 kg (239 lb 1.4 oz)   02/01/17 108.9 kg (240 lb)   12/15/16 119.4 kg (263 lb 3.7 oz)   08/26/16 112.5 kg (248 lb)         Review of Systems:  Review of Systems   Constitutional: Negative for chills and fever.   HENT: Negative for congestion and sore throat.    Eyes: Negative for photophobia, pain and discharge.   Respiratory: Positive for shortness of breath. Negative for cough, hemoptysis and sputum production.     Cardiovascular: Positive for orthopnea, leg swelling and PND. Negative for chest pain and palpitations.   Gastrointestinal: Negative for abdominal pain, diarrhea, nausea and vomiting.   Genitourinary: Negative for dysuria and urgency.   Musculoskeletal: Negative for myalgias and neck pain.   Skin: Negative for itching and rash.   Neurological: Negative for sensory change, focal weakness and headaches.   Endo/Heme/Allergies: Negative for polydipsia. Does not bruise/bleed easily.   Psychiatric/Behavioral: Negative for depression and suicidal ideas.     Past Medical History:   Diagnosis Date    Anticoagulant long-term use     Cardiomegaly     Chronic combined systolic and diastolic congestive heart failure     Coronary artery disease     Heart attack 2006    Hypertension     Hyperthyroidism, subclinical 1/2/2013    MI (myocardial infarction) 9/22/2013    MI in 2009      Paroxysmal atrial fibrillation     PE (pulmonary embolism) 1/1/2013    IN 2010     S/P ablation of atrial flutter 2008    Stroke 2009    no residual weaknesses     Social History     Socioeconomic History    Marital status: Single     Spouse name: Not on file    Number of children: Not on file    Years of education: Not on file    Highest education level: Not on file   Occupational History    Not on file   Social Needs    Financial resource strain: Not on file    Food insecurity:     Worry: Not on file     Inability: Not on file    Transportation needs:     Medical: Not on file     Non-medical: Not on file   Tobacco Use    Smoking status: Never Smoker    Smokeless tobacco: Never Used   Substance and Sexual Activity    Alcohol use: No    Drug use: No    Sexual activity: Never   Lifestyle    Physical activity:     Days per week: Not on file     Minutes per session: Not on file    Stress: Not on file   Relationships    Social connections:     Talks on phone: Not on file     Gets together: Not on file     Attends Synagogue  "service: Not on file     Active member of club or organization: Not on file     Attends meetings of clubs or organizations: Not on file     Relationship status: Not on file   Other Topics Concern    Not on file   Social History Narrative    Not on file       OBJECTIVE:       Intake/Output Summary (Last 24 hours) at 12/31/2019 1041  Last data filed at 12/31/2019 0900  Gross per 24 hour   Intake 1320 ml   Output 3150 ml   Net -1830 ml     Vital Signs Range (Last 24H):  Temp:  [97.7 °F (36.5 °C)-98.5 °F (36.9 °C)]   Pulse:  [100-128]   Resp:  [16-18]   BP: (117-138)/(56-70)   SpO2:  [90 %-98 %]   Body mass index is 42.53 kg/m².    Objective:  General Appearance:  Comfortable, well-appearing, in no acute distress and not in pain.    Vital signs: (most recent): Blood pressure 118/70, pulse 108, temperature 97.7 °F (36.5 °C), resp. rate 18, height 5' 9" (1.753 m), weight 130.7 kg (288 lb 0.5 oz), SpO2 (!) 92 %.  No fever.    Output: Producing urine and producing stool.    HEENT: Normal HEENT exam.  (+JVD)    Lungs:  Normal effort.  Breath sounds clear to auscultation.  He is not in respiratory distress.  There are rales.  No wheezes.    Heart: Normal rate.  Regular rhythm.  S1 normal and S2 normal.  Positive for murmur.    Chest: Symmetric chest wall expansion.   Abdomen: Abdomen is soft.  Bowel sounds are normal.   There is no abdominal tenderness.     Extremities: Normal range of motion.  There is venous stasis and dependent edema.  There is no deformity, effusion or local swelling.    Pulses: Distal pulses are intact.    Neurological: Patient is alert and oriented to person, place and time.  Normal strength.    Pupils:  Pupils are equal, round, and reactive to light.    Skin:  Warm and dry.      Medications:  Medication list was reviewed in EPIC and changes noted under Assessment/Plan and MAR.    Laboratory:  Recent Labs     12/31/19  0309   WBC 6.52   RBC 2.95*   HGB 7.1*   HCT 25.3*      MCV 86   MCH 24.1* "   MCHC 28.1*   GRAN 58.9  3.9   LYMPH 11.5*  0.8*   MONO 19.2*  1.3*   EOS 0.6*      Recent Labs     12/31/19  0309         K 3.5   CL 92*   CO2 32*   BUN 94*   CREATININE 3.0*   CALCIUM 9.7   ANIONGAP 16   MG 2.3   PHOS 4.2     Prior records reviewed.    ECHO reviewed:  · Severely decreased left ventricular systolic function. The estimated ejection fraction is 25%  · Concentric left ventricular hypertrophy.  · Global hypokinetic wall motion.  · Moderate right ventricular enlargement.  · Moderately reduced right ventricular systolic function.  · Severe biatrial enlargement.  · Moderate mitral regurgitation.  · Moderate tricuspid regurgitation.  · The estimated PA systolic pressure is 40 mm Hg  · Elevated central venous pressure (15 mm Hg).  · Atrial fibrillation observed.      Imaging reviewed  Imaging Results          CT Chest Without Contrast (No Result on File)                X-Ray Chest AP Portable (Final result)  Result time 12/24/19 19:09:07    Final result by Zeb Hirsch MD (12/24/19 19:09:07)                 Impression:      Masslike opacity overlying the right lower lung zone, similar to prior exam.  Right pleural fluid not excluded.  Chest CT recommended for further evaluation of right lung masslike opacity.    Patchy bilateral pulmonary opacities, similar to prior exam.    Cardiomegaly.    Electronically signed by resident: Cristofer Reddy  Date:    12/24/2019  Time:    18:48    Electronically signed by: Zeb Hirsch MD  Date:    12/24/2019  Time:    19:09             Narrative:    EXAMINATION:  XR CHEST AP PORTABLE    CLINICAL HISTORY:  CHF;    TECHNIQUE:  Single frontal view of the chest was performed.    COMPARISON:  Chest radiograph 12/03/2019    FINDINGS:  Cardiac leads overlie the chest wall.    Redemonstration of extensive bilateral patchy opacities, more prominent on the right with a focal area of masslike opacity in the right lower lung zone.  No significant change from prior  exam.  Left costophrenic angle is visible and right sided pleural fluid not excluded noting presence of opacification.  No pneumothorax.    The cardiac silhouette is enlarged.  Hilar and mediastinal contours are unchanged.    Bones are intact.                                  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 [I13.0]  Yes    Elevated alkaline phosphatase level [R74.8]  Yes    Coronary artery disease [I25.10]  Yes     Chronic    Stage 3 chronic kidney disease [N18.3]  Yes    Subtherapeutic international normalized ratio (INR) [R79.1]  Yes    Personal history of cerebral embolism [Z86.79]  Not Applicable    Personal history of venous thrombosis and embolism [Z86.718]  Not Applicable    Personal history of MI (myocardial infarction) [I25.2]  Not Applicable    Essential hypertension [I10]  Yes     Chronic    Atrial fibrillation, chronic [I48.20]  Yes     Chronic    Chronic anticoagulation [Z79.01]  Not Applicable     Chronic    Cardiomyopathy [I42.9]  Yes     Chronic      Resolved Hospital Problems   No resolved problems to display.      Acute on Chronic Combined Systolic and Diastolic Heart Failure  Cardiomyopathy  · CHF Pathway initiated  · Last 2D echo Nov 2019 with EF 25%  ·  and CXR with pulmonary edema and cardiomegaly  · Cont Lasix at 30 gtt.  2.5. Diuril 500 once. Pt continues to report disinterest in home dobutamine or palliative care. Pt with extremely poor insight into his disease. States he'll consider outpt Lasix infusions only if there's a handicap spot for him to park in.  · Goal diuresis 2-3L/day.  Home diuretic dose:  Lasix 80mg PO BID  · BID BMP (with morning labs and at 4pm)  · Strict I&Os with daily weights.  Dry weight 232 lb?  Admit weight 286lbs.  · Hold BB, okay to c/w BiDil     Chronic  AFib  Long Term Use of Anticoagulants  Subtherapeutic INR  · Chronic issue  · HR in 100-120's. Resume BB  · Continue Warfarin at increased dose - Pharmacy consulted for dosing management     Essential HTN  · Chronic and stable  · Continue Bidil BID  · Resume BB     CAD  History of MI  · Chronic and stable  · Continue Aspirin 81mg PO daily  · Continue Bidil BID  · Resume BB     Anemia  · Hgb > 7  · Received 5 units PRBCs on prior hospital admission  · Continue PO iron  · Type and screen obtained  · Monitor for bleeding/need for transfusion     History of VTE  History of Stroke  · Chronic and stable  · Continue Aspirin 81mg PO daily  · Continue Warfarin as above     Stage 3 CKD  · Chronic and stable  · Monitor with diuresis     Elevated Alk Phos  · Chronic and stable  · Monitor     Benign Hypertensive Heart and Kidney Disease with HF and CKD  · As above     Abnormal CT  · Needs CT chest when able to lie flat or outpt to evaluate masslike opacity seen on CXR     Epistaxis  - likely from o2; use humidified o2 only  - afrin ordered  - avoid nose blowing or nose picking     Diet:  Low Sodium, 1.2L fluid restriction  VTE PPx:  Warfarin  Goals of Care:  Full     High Risk Conditions:   Patient is currently on drug therapy requiring intensive monitoring for toxicity: Lasix gtt     Anticipated discharge date and disposition:   Pending diuresis    Antoinette Goins MD  [facility]

## 2020-01-01 LAB
ALBUMIN SERPL BCP-MCNC: 3.3 G/DL (ref 3.5–5.2)
ALP SERPL-CCNC: 260 U/L (ref 55–135)
ALT SERPL W/O P-5'-P-CCNC: 17 U/L (ref 10–44)
ANION GAP SERPL CALC-SCNC: 14 MMOL/L (ref 8–16)
ANION GAP SERPL CALC-SCNC: 14 MMOL/L (ref 8–16)
AST SERPL-CCNC: 24 U/L (ref 10–40)
BASOPHILS # BLD AUTO: 0.04 K/UL (ref 0–0.2)
BASOPHILS NFR BLD: 0.6 % (ref 0–1.9)
BILIRUB SERPL-MCNC: 1.3 MG/DL (ref 0.1–1)
BUN SERPL-MCNC: 85 MG/DL (ref 6–20)
BUN SERPL-MCNC: 89 MG/DL (ref 6–20)
CALCIUM SERPL-MCNC: 9.8 MG/DL (ref 8.7–10.5)
CALCIUM SERPL-MCNC: 9.8 MG/DL (ref 8.7–10.5)
CHLORIDE SERPL-SCNC: 90 MMOL/L (ref 95–110)
CHLORIDE SERPL-SCNC: 93 MMOL/L (ref 95–110)
CO2 SERPL-SCNC: 32 MMOL/L (ref 23–29)
CO2 SERPL-SCNC: 33 MMOL/L (ref 23–29)
CREAT SERPL-MCNC: 2.6 MG/DL (ref 0.5–1.4)
CREAT SERPL-MCNC: 2.8 MG/DL (ref 0.5–1.4)
DIFFERENTIAL METHOD: ABNORMAL
EOSINOPHIL # BLD AUTO: 0.6 K/UL (ref 0–0.5)
EOSINOPHIL NFR BLD: 8.9 % (ref 0–8)
ERYTHROCYTE [DISTWIDTH] IN BLOOD BY AUTOMATED COUNT: 23.9 % (ref 11.5–14.5)
EST. GFR  (AFRICAN AMERICAN): 28.5 ML/MIN/1.73 M^2
EST. GFR  (AFRICAN AMERICAN): 31.2 ML/MIN/1.73 M^2
EST. GFR  (NON AFRICAN AMERICAN): 24.6 ML/MIN/1.73 M^2
EST. GFR  (NON AFRICAN AMERICAN): 26.9 ML/MIN/1.73 M^2
GLUCOSE SERPL-MCNC: 111 MG/DL (ref 70–110)
GLUCOSE SERPL-MCNC: 111 MG/DL (ref 70–110)
HCT VFR BLD AUTO: 24.2 % (ref 40–54)
HGB BLD-MCNC: 7.3 G/DL (ref 14–18)
IMM GRANULOCYTES # BLD AUTO: 0.02 K/UL (ref 0–0.04)
IMM GRANULOCYTES NFR BLD AUTO: 0.3 % (ref 0–0.5)
INR PPP: 2.6 (ref 0.8–1.2)
LYMPHOCYTES # BLD AUTO: 0.6 K/UL (ref 1–4.8)
LYMPHOCYTES NFR BLD: 8.9 % (ref 18–48)
MAGNESIUM SERPL-MCNC: 2.3 MG/DL (ref 1.6–2.6)
MCH RBC QN AUTO: 25.5 PG (ref 27–31)
MCHC RBC AUTO-ENTMCNC: 30.2 G/DL (ref 32–36)
MCV RBC AUTO: 85 FL (ref 82–98)
MONOCYTES # BLD AUTO: 1.2 K/UL (ref 0.3–1)
MONOCYTES NFR BLD: 19.8 % (ref 4–15)
NEUTROPHILS # BLD AUTO: 3.9 K/UL (ref 1.8–7.7)
NEUTROPHILS NFR BLD: 61.5 % (ref 38–73)
NRBC BLD-RTO: 0 /100 WBC
PHOSPHATE SERPL-MCNC: 3.9 MG/DL (ref 2.7–4.5)
PLATELET # BLD AUTO: 214 K/UL (ref 150–350)
PMV BLD AUTO: 9.6 FL (ref 9.2–12.9)
POTASSIUM SERPL-SCNC: 3.5 MMOL/L (ref 3.5–5.1)
POTASSIUM SERPL-SCNC: 4.3 MMOL/L (ref 3.5–5.1)
PROT SERPL-MCNC: 8.6 G/DL (ref 6–8.4)
PROTHROMBIN TIME: 24.9 SEC (ref 9–12.5)
RBC # BLD AUTO: 2.86 M/UL (ref 4.6–6.2)
SODIUM SERPL-SCNC: 137 MMOL/L (ref 136–145)
SODIUM SERPL-SCNC: 139 MMOL/L (ref 136–145)
WBC # BLD AUTO: 6.27 K/UL (ref 3.9–12.7)

## 2020-01-01 PROCEDURE — 20600001 HC STEP DOWN PRIVATE ROOM

## 2020-01-01 PROCEDURE — 80053 COMPREHEN METABOLIC PANEL: CPT

## 2020-01-01 PROCEDURE — 36415 COLL VENOUS BLD VENIPUNCTURE: CPT

## 2020-01-01 PROCEDURE — 25000003 PHARM REV CODE 250: Performed by: HOSPITALIST

## 2020-01-01 PROCEDURE — 99233 SBSQ HOSP IP/OBS HIGH 50: CPT | Mod: ,,, | Performed by: HOSPITALIST

## 2020-01-01 PROCEDURE — 84100 ASSAY OF PHOSPHORUS: CPT

## 2020-01-01 PROCEDURE — 85025 COMPLETE CBC W/AUTO DIFF WBC: CPT

## 2020-01-01 PROCEDURE — 63600175 PHARM REV CODE 636 W HCPCS: Performed by: HOSPITALIST

## 2020-01-01 PROCEDURE — 80048 BASIC METABOLIC PNL TOTAL CA: CPT

## 2020-01-01 PROCEDURE — 83735 ASSAY OF MAGNESIUM: CPT

## 2020-01-01 PROCEDURE — 85610 PROTHROMBIN TIME: CPT

## 2020-01-01 PROCEDURE — 99233 PR SUBSEQUENT HOSPITAL CARE,LEVL III: ICD-10-PCS | Mod: ,,, | Performed by: HOSPITALIST

## 2020-01-01 RX ORDER — POTASSIUM CHLORIDE 20 MEQ/15ML
20 SOLUTION ORAL
Status: COMPLETED | OUTPATIENT
Start: 2020-01-01 | End: 2020-01-01

## 2020-01-01 RX ADMIN — ASPIRIN 81 MG: 81 TABLET, COATED ORAL at 09:01

## 2020-01-01 RX ADMIN — POTASSIUM CHLORIDE 20 MEQ: 20 SOLUTION ORAL at 11:01

## 2020-01-01 RX ADMIN — MORPHINE SULFATE 1 MG: 2 INJECTION, SOLUTION INTRAMUSCULAR; INTRAVENOUS at 09:01

## 2020-01-01 RX ADMIN — FERROUS SULFATE TAB EC 325 MG (65 MG FE EQUIVALENT) 325 MG: 325 (65 FE) TABLET DELAYED RESPONSE at 09:01

## 2020-01-01 RX ADMIN — WARFARIN SODIUM 8 MG: 3 TABLET ORAL at 05:01

## 2020-01-01 RX ADMIN — POTASSIUM CHLORIDE 20 MEQ: 20 SOLUTION ORAL at 02:01

## 2020-01-01 RX ADMIN — HYDRALAZINE HYDROCHLORIDE AND ISOSORBIDE DINITRATE 1 TABLET: 37.5; 2 TABLET, FILM COATED ORAL at 09:01

## 2020-01-01 RX ADMIN — HYDRALAZINE HYDROCHLORIDE AND ISOSORBIDE DINITRATE 1 TABLET: 37.5; 2 TABLET, FILM COATED ORAL at 08:01

## 2020-01-01 RX ADMIN — FUROSEMIDE 30 MG/HR: 10 INJECTION, SOLUTION INTRAMUSCULAR; INTRAVENOUS at 05:01

## 2020-01-01 RX ADMIN — MENTHOL, METHYL SALICYLATE: 10; 15 CREAM TOPICAL at 02:01

## 2020-01-01 RX ADMIN — FUROSEMIDE 30 MG/HR: 10 INJECTION, SOLUTION INTRAMUSCULAR; INTRAVENOUS at 04:01

## 2020-01-01 RX ADMIN — FUROSEMIDE 30 MG/HR: 10 INJECTION, SOLUTION INTRAMUSCULAR; INTRAVENOUS at 09:01

## 2020-01-01 RX ADMIN — POTASSIUM CHLORIDE 20 MEQ: 20 SOLUTION ORAL at 09:01

## 2020-01-01 RX ADMIN — MENTHOL, METHYL SALICYLATE: 10; 15 CREAM TOPICAL at 11:01

## 2020-01-01 NOTE — PLAN OF CARE
Patient cooperative but minimal verbalization with routine care and procedures.  Resting quietly at present with lasix and dobutamine infusing.  Fall precautions reviewed with patient and understanding verbalized by patient.  Will continue to monitor.

## 2020-01-01 NOTE — PROGRESS NOTES
Patient with right groin pain unrelieved with muscle relaxer and requesting medication for gout pain.  Dr. Lenz notified and orders received for one time dose of oxycodone.  Also notified of 7 beat run of VT with more frequent PVC's.  Patient asymptomatic.  Orders received to continue to monitor and notify if persists or increasing.  Will continue to monitor.

## 2020-01-01 NOTE — PLAN OF CARE
Patient free of falls/traumas/injuries.  gtts infusing @ 2 mcg/kg/min. Lasix gtts infusing @ 30 mg/hr. Pain managed with PRN morphine 1 mg IVP. K+ 3.5 40 mEq K+ PO oral solution. Mg 2.3. CAMI hose in place. Heat packs admin and icy hot cream applied for r. Hip pain. Skin clean, dry, and intact. Patient educated on plan of care. Pt with minimal conversation. Patients VS stable and no distress. Will continue to monitor.

## 2020-01-01 NOTE — PROGRESS NOTES
Progress Note  Hospital Medicine    Provider team: [facility]  Admit Date: 12/24/2019  Encounter Date: 01/01/2020     SUBJECTIVE:     Follow-up Visit for: Acute on chronic combined systolic and diastolic congestive heart failure    HPI (See H&P for complete P,F,SHx):  54 y.o. male with chronic combined systolic and diastolic heart failure (EF 25%), afib on Coumadin, CAD, CVA, and history of PE who presents to the ED for evaluation of shortness of breath and edema.  He was just admitted to Hospital Medicine from 11/24-12/8 for a CHF exacerbation.  During that admission, he was evaluated by Cardiology who started him on Dobutamine and Diuril.  He was resistant to HTS evaluation during that stay, which included recommendations for LVAD, transplant, and ICD.  He was discharged home on Lasix 80mg PO BID.  He endorses compliance with his diuretic regimen, but despite this and following a low salt diet, he has continued to gain weight, have lower extremity and abdominal swelling, as well as shortness of breath.  He is so short of breath that he has to sleep in a chair, and has been unable to sleep because of his breathing.  He denies any chest pain.  He does have a cough that is mostly dry, but occasionally productive of blood tinged sputum.  He was discharged weight a weight of 259lbs, and claims that is around his dry weight.  He is currently 286lbs.       Upon arrival to the ED, vitals were /83, HR 88, RR 22.  Labs showed Hemoglobin 7.3, INR 1.5, Creatinine 1.8, Alk Phos 278,  with Trop 0.128.  CXR showed cardiomegaly and pulmonary edema, along with a masslike opacity.  CT chest was attempted to be ordered to evaluate the mass, but he was unable to lie flat to get it evaluated.  He was given Lasix 80mg IV and was admitted to Hospital Medicine for further management.    TTE 11/27/2019  · Severely decreased left ventricular systolic function.   · The estimated ejection fraction is 25%  · Concentric left  "ventricular hypertrophy.  · Global hypokinetic wall motion.  · Moderate right ventricular enlargement.  · Moderately reduced right ventricular systolic function.  · Severe biatrial enlargement.  · Moderate mitral regurgitation.  · Moderate tricuspid regurgitation.  · The estimated PA systolic pressure is 40 mm Hg  · Elevated central venous pressure (15 mm Hg).  · Atrial fibrillation observed.    Interval history:  Upset about his R side pain that he attributes to the "drips" he is on. No abnormalities noted on exam. Requesting Bengay. Down 11 lb since yesterday. Continuing Lasix and  gtt's.    Wt Readings from Last 30 Encounters:   01/01/20 124.6 kg (274 lb 11.1 oz)   12/07/19 117.6 kg (259 lb 4.2 oz)   10/23/19 108.9 kg (240 lb)   09/09/19 108.9 kg (240 lb)   08/04/19 105.2 kg (232 lb)   07/18/19 108.2 kg (238 lb 8.6 oz)   06/20/19 118.9 kg (262 lb 2 oz)   05/21/19 121.1 kg (267 lb)   05/16/19 116.3 kg (256 lb 6.3 oz)   04/20/19 127.5 kg (281 lb 1.4 oz)   01/31/19 113.4 kg (250 lb)   11/04/18 123.1 kg (271 lb 6.2 oz)   08/31/18 119.6 kg (263 lb 10.7 oz)   07/28/18 113.4 kg (250 lb)   07/10/18 120.7 kg (266 lb)   05/03/18 117.9 kg (259 lb 14.8 oz)   03/30/18 117.9 kg (260 lb)   02/21/18 117.9 kg (260 lb)   01/12/18 113.4 kg (250 lb)   12/10/17 117.9 kg (260 lb)   11/11/17 113.9 kg (251 lb)   11/01/17 111.8 kg (246 lb 7.6 oz)   10/26/17 118.5 kg (261 lb 3.2 oz)   08/13/17 117.9 kg (260 lb)   08/12/17 117.9 kg (260 lb)   05/10/17 117.9 kg (260 lb)   04/05/17 108.4 kg (239 lb 1.4 oz)   02/01/17 108.9 kg (240 lb)   12/15/16 119.4 kg (263 lb 3.7 oz)   08/26/16 112.5 kg (248 lb)         Review of Systems:  Review of Systems   Constitutional: Negative for chills and fever.   HENT: Negative for congestion and sore throat.    Eyes: Negative for photophobia, pain and discharge.   Respiratory: Positive for shortness of breath. Negative for cough, hemoptysis and sputum production.    Cardiovascular: Positive for orthopnea, " leg swelling and PND. Negative for chest pain and palpitations.   Gastrointestinal: Negative for abdominal pain, diarrhea, nausea and vomiting.   Genitourinary: Negative for dysuria and urgency.   Musculoskeletal: Negative for myalgias and neck pain.   Skin: Negative for itching and rash.   Neurological: Negative for sensory change, focal weakness and headaches.   Endo/Heme/Allergies: Negative for polydipsia. Does not bruise/bleed easily.   Psychiatric/Behavioral: Negative for depression and suicidal ideas.     Past Medical History:   Diagnosis Date    Anticoagulant long-term use     Cardiomegaly     Chronic combined systolic and diastolic congestive heart failure     Coronary artery disease     Heart attack 2006    Hypertension     Hyperthyroidism, subclinical 1/2/2013    MI (myocardial infarction) 9/22/2013    MI in 2009      Paroxysmal atrial fibrillation     PE (pulmonary embolism) 1/1/2013    IN 2010     S/P ablation of atrial flutter 2008    Stroke 2009    no residual weaknesses     Social History     Socioeconomic History    Marital status: Single     Spouse name: Not on file    Number of children: Not on file    Years of education: Not on file    Highest education level: Not on file   Occupational History    Not on file   Social Needs    Financial resource strain: Not on file    Food insecurity:     Worry: Not on file     Inability: Not on file    Transportation needs:     Medical: Not on file     Non-medical: Not on file   Tobacco Use    Smoking status: Never Smoker    Smokeless tobacco: Never Used   Substance and Sexual Activity    Alcohol use: No    Drug use: No    Sexual activity: Never   Lifestyle    Physical activity:     Days per week: Not on file     Minutes per session: Not on file    Stress: Not on file   Relationships    Social connections:     Talks on phone: Not on file     Gets together: Not on file     Attends Religion service: Not on file     Active member of club  "or organization: Not on file     Attends meetings of clubs or organizations: Not on file     Relationship status: Not on file   Other Topics Concern    Not on file   Social History Narrative    Not on file       OBJECTIVE:       Intake/Output Summary (Last 24 hours) at 1/1/2020 1447  Last data filed at 1/1/2020 1400  Gross per 24 hour   Intake 1513.6 ml   Output 2175 ml   Net -661.4 ml     Vital Signs Range (Last 24H):  Temp:  [97.2 °F (36.2 °C)-97.8 °F (36.6 °C)]   Pulse:  []   Resp:  [17-20]   BP: (104-142)/(53-67)   SpO2:  [90 %-94 %]   Body mass index is 40.57 kg/m².    Objective:  General Appearance:  Comfortable, well-appearing, in no acute distress and not in pain.    Vital signs: (most recent): Blood pressure 116/60, pulse 98, temperature 97.2 °F (36.2 °C), temperature source Oral, resp. rate 18, height 5' 9" (1.753 m), weight 124.6 kg (274 lb 11.1 oz), SpO2 (!) 92 %.  No fever.    Output: Producing urine and producing stool.    HEENT: Normal HEENT exam.  (+JVD)    Lungs:  Normal effort.  Breath sounds clear to auscultation.  He is not in respiratory distress.  There are rales.  No wheezes.    Heart: Normal rate.  Regular rhythm.  S1 normal and S2 normal.  Positive for murmur.    Chest: Symmetric chest wall expansion.   Abdomen: Abdomen is soft.  Bowel sounds are normal.   There is no abdominal tenderness.     Extremities: Normal range of motion.  There is venous stasis and dependent edema.  There is no deformity, effusion or local swelling.    Pulses: Distal pulses are intact.    Neurological: Patient is alert and oriented to person, place and time.  Normal strength.    Pupils:  Pupils are equal, round, and reactive to light.    Skin:  Warm and dry.      Medications:  Medication list was reviewed in EPIC and changes noted under Assessment/Plan and MAR.    Laboratory:  Recent Labs     01/01/20  0524   WBC 6.27   RBC 2.86*   HGB 7.3*   HCT 24.2*      MCV 85   MCH 25.5*   MCHC 30.2*   GRAN 61.5 "  3.9   LYMPH 8.9*  0.6*   MONO 19.8*  1.2*   EOS 0.6*      Recent Labs     01/01/20  0524   *      K 3.5   CL 90*   CO2 33*   BUN 89*   CREATININE 2.8*   CALCIUM 9.8   ANIONGAP 14   MG 2.3   PHOS 3.9     Prior records reviewed.    ECHO reviewed:  · Severely decreased left ventricular systolic function. The estimated ejection fraction is 25%  · Concentric left ventricular hypertrophy.  · Global hypokinetic wall motion.  · Moderate right ventricular enlargement.  · Moderately reduced right ventricular systolic function.  · Severe biatrial enlargement.  · Moderate mitral regurgitation.  · Moderate tricuspid regurgitation.  · The estimated PA systolic pressure is 40 mm Hg  · Elevated central venous pressure (15 mm Hg).  · Atrial fibrillation observed.      Imaging reviewed  Imaging Results          CT Chest Without Contrast (No Result on File)                X-Ray Chest AP Portable (Final result)  Result time 12/24/19 19:09:07    Final result by Zeb Hirsch MD (12/24/19 19:09:07)                 Impression:      Masslike opacity overlying the right lower lung zone, similar to prior exam.  Right pleural fluid not excluded.  Chest CT recommended for further evaluation of right lung masslike opacity.    Patchy bilateral pulmonary opacities, similar to prior exam.    Cardiomegaly.    Electronically signed by resident: Cristofer Reddy  Date:    12/24/2019  Time:    18:48    Electronically signed by: Zeb Hirsch MD  Date:    12/24/2019  Time:    19:09             Narrative:    EXAMINATION:  XR CHEST AP PORTABLE    CLINICAL HISTORY:  CHF;    TECHNIQUE:  Single frontal view of the chest was performed.    COMPARISON:  Chest radiograph 12/03/2019    FINDINGS:  Cardiac leads overlie the chest wall.    Redemonstration of extensive bilateral patchy opacities, more prominent on the right with a focal area of masslike opacity in the right lower lung zone.  No significant change from prior exam.  Left costophrenic  angle is visible and right sided pleural fluid not excluded noting presence of opacification.  No pneumothorax.    The cardiac silhouette is enlarged.  Hilar and mediastinal contours are unchanged.    Bones are intact.                                  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 [I13.0]  Yes    Elevated alkaline phosphatase level [R74.8]  Yes    Coronary artery disease [I25.10]  Yes     Chronic    Stage 3 chronic kidney disease [N18.3]  Yes    Subtherapeutic international normalized ratio (INR) [R79.1]  Yes    Personal history of cerebral embolism [Z86.79]  Not Applicable    Personal history of venous thrombosis and embolism [Z86.718]  Not Applicable    Personal history of MI (myocardial infarction) [I25.2]  Not Applicable    Essential hypertension [I10]  Yes     Chronic    Atrial fibrillation, chronic [I48.20]  Yes     Chronic    Chronic anticoagulation [Z79.01]  Not Applicable     Chronic    Cardiomyopathy [I42.9]  Yes     Chronic      Resolved Hospital Problems   No resolved problems to display.      Acute on Chronic Combined Systolic and Diastolic Heart Failure  Cardiomyopathy  · CHF Pathway initiated  · Last 2D echo Nov 2019 with EF 25%  ·  and CXR with pulmonary edema and cardiomegaly  · Started on Lasix gtt. Received several doses of IV Diuril for augmentation but diuresis was limited. Now responding well after Lasix gtt increased to 30 mg/hr. Cont  gtt 2.5. Pt continues to report disinterest in home dobutamine or palliative care. Pt with extremely poor insight into his disease. States he'll consider outpt Lasix infusions only if there's a handicap spot for him to park in.  · Goal diuresis 2-3L/day.  Home diuretic dose:  Lasix 80mg PO BID  · BID BMP (with morning labs and at 4pm)  · Strict I&Os  with daily weights.  Dry weight 232 lb?  Admit weight 286lbs.  · Hold BB, okay to c/w BiDil     JEAN-PIERRE on CKD  - Baseline 1.8  - Cardiorenal, improving w/ diuresis and     Chronic AFib  Long Term Use of Anticoagulants  Subtherapeutic INR  · Chronic issue  · HR in 100-120's. Resume BB  · Continue Warfarin at increased dose - Pharmacy consulted for dosing management     Essential HTN  · Chronic and stable  · Continue Bidil BID  · Resume BB     CAD  History of MI  · Chronic and stable  · Continue Aspirin 81mg PO daily  · Continue Bidil BID  · Resume BB     Anemia  · Hgb > 7  · Received 5 units PRBCs on prior hospital admission  · Continue PO iron  · Type and screen obtained  · Monitor for bleeding/need for transfusion     History of VTE  History of Stroke  · Chronic and stable  · Continue Aspirin 81mg PO daily  · Continue Warfarin as above     Stage 3 CKD  · Chronic and stable  · Monitor with diuresis     Elevated Alk Phos  · Chronic and stable  · Monitor     Benign Hypertensive Heart and Kidney Disease with HF and CKD  · As above     Abnormal CT  · Needs CT chest when able to lie flat or outpt to evaluate masslike opacity seen on CXR     Epistaxis  - likely from o2; use humidified o2 only  - afrin ordered  - avoid nose blowing or nose picking     Diet:  Low Sodium, 1.2L fluid restriction  VTE PPx:  Warfarin  Goals of Care:  Full     High Risk Conditions:   Patient is currently on drug therapy requiring intensive monitoring for toxicity: Lasix gtt     Anticipated discharge date and disposition:   Pending diuresis    Antoinette Goins MD  [facility]

## 2020-01-02 LAB
ALBUMIN SERPL BCP-MCNC: 3.3 G/DL (ref 3.5–5.2)
ALP SERPL-CCNC: 249 U/L (ref 55–135)
ALT SERPL W/O P-5'-P-CCNC: 16 U/L (ref 10–44)
ANION GAP SERPL CALC-SCNC: 13 MMOL/L (ref 8–16)
ANISOCYTOSIS BLD QL SMEAR: SLIGHT
AST SERPL-CCNC: 25 U/L (ref 10–40)
BASOPHILS # BLD AUTO: 0.04 K/UL (ref 0–0.2)
BASOPHILS NFR BLD: 0.6 % (ref 0–1.9)
BILIRUB SERPL-MCNC: 1.3 MG/DL (ref 0.1–1)
BUN SERPL-MCNC: 80 MG/DL (ref 6–20)
CALCIUM SERPL-MCNC: 9.9 MG/DL (ref 8.7–10.5)
CHLORIDE SERPL-SCNC: 95 MMOL/L (ref 95–110)
CO2 SERPL-SCNC: 32 MMOL/L (ref 23–29)
CREAT SERPL-MCNC: 2.5 MG/DL (ref 0.5–1.4)
DACRYOCYTES BLD QL SMEAR: ABNORMAL
DIFFERENTIAL METHOD: ABNORMAL
EOSINOPHIL # BLD AUTO: 0.5 K/UL (ref 0–0.5)
EOSINOPHIL NFR BLD: 7.4 % (ref 0–8)
ERYTHROCYTE [DISTWIDTH] IN BLOOD BY AUTOMATED COUNT: 23.7 % (ref 11.5–14.5)
EST. GFR  (AFRICAN AMERICAN): 32.7 ML/MIN/1.73 M^2
EST. GFR  (NON AFRICAN AMERICAN): 28.3 ML/MIN/1.73 M^2
GLUCOSE SERPL-MCNC: 116 MG/DL (ref 70–110)
HCT VFR BLD AUTO: 25.8 % (ref 40–54)
HGB BLD-MCNC: 7.3 G/DL (ref 14–18)
HYPOCHROMIA BLD QL SMEAR: ABNORMAL
IMM GRANULOCYTES # BLD AUTO: 0.02 K/UL (ref 0–0.04)
IMM GRANULOCYTES NFR BLD AUTO: 0.3 % (ref 0–0.5)
INR PPP: 3 (ref 0.8–1.2)
LYMPHOCYTES # BLD AUTO: 0.6 K/UL (ref 1–4.8)
LYMPHOCYTES NFR BLD: 8.3 % (ref 18–48)
MAGNESIUM SERPL-MCNC: 2.2 MG/DL (ref 1.6–2.6)
MCH RBC QN AUTO: 24.6 PG (ref 27–31)
MCHC RBC AUTO-ENTMCNC: 28.3 G/DL (ref 32–36)
MCV RBC AUTO: 87 FL (ref 82–98)
MONOCYTES # BLD AUTO: 1.3 K/UL (ref 0.3–1)
MONOCYTES NFR BLD: 19.6 % (ref 4–15)
NEUTROPHILS # BLD AUTO: 4.3 K/UL (ref 1.8–7.7)
NEUTROPHILS NFR BLD: 63.8 % (ref 38–73)
NRBC BLD-RTO: 0 /100 WBC
OVALOCYTES BLD QL SMEAR: ABNORMAL
PHOSPHATE SERPL-MCNC: 3.5 MG/DL (ref 2.7–4.5)
PLATELET # BLD AUTO: 208 K/UL (ref 150–350)
PLATELET BLD QL SMEAR: ABNORMAL
PMV BLD AUTO: 10.1 FL (ref 9.2–12.9)
POIKILOCYTOSIS BLD QL SMEAR: SLIGHT
POLYCHROMASIA BLD QL SMEAR: ABNORMAL
POTASSIUM SERPL-SCNC: 3.8 MMOL/L (ref 3.5–5.1)
PROT SERPL-MCNC: 8.5 G/DL (ref 6–8.4)
PROTHROMBIN TIME: 28.5 SEC (ref 9–12.5)
RBC # BLD AUTO: 2.97 M/UL (ref 4.6–6.2)
SODIUM SERPL-SCNC: 140 MMOL/L (ref 136–145)
TARGETS BLD QL SMEAR: ABNORMAL
WBC # BLD AUTO: 6.75 K/UL (ref 3.9–12.7)

## 2020-01-02 PROCEDURE — 63600175 PHARM REV CODE 636 W HCPCS: Performed by: HOSPITALIST

## 2020-01-02 PROCEDURE — 25000003 PHARM REV CODE 250: Performed by: HOSPITALIST

## 2020-01-02 PROCEDURE — 85610 PROTHROMBIN TIME: CPT

## 2020-01-02 PROCEDURE — 99233 PR SUBSEQUENT HOSPITAL CARE,LEVL III: ICD-10-PCS | Mod: ,,, | Performed by: HOSPITALIST

## 2020-01-02 PROCEDURE — 20600001 HC STEP DOWN PRIVATE ROOM

## 2020-01-02 PROCEDURE — 36415 COLL VENOUS BLD VENIPUNCTURE: CPT

## 2020-01-02 PROCEDURE — 97530 THERAPEUTIC ACTIVITIES: CPT

## 2020-01-02 PROCEDURE — 84100 ASSAY OF PHOSPHORUS: CPT

## 2020-01-02 PROCEDURE — 85025 COMPLETE CBC W/AUTO DIFF WBC: CPT

## 2020-01-02 PROCEDURE — 83735 ASSAY OF MAGNESIUM: CPT

## 2020-01-02 PROCEDURE — 80053 COMPREHEN METABOLIC PANEL: CPT

## 2020-01-02 PROCEDURE — 99233 SBSQ HOSP IP/OBS HIGH 50: CPT | Mod: ,,, | Performed by: HOSPITALIST

## 2020-01-02 RX ORDER — POTASSIUM CHLORIDE 20 MEQ/1
40 TABLET, EXTENDED RELEASE ORAL ONCE
Status: DISCONTINUED | OUTPATIENT
Start: 2020-01-02 | End: 2020-01-02

## 2020-01-02 RX ORDER — WARFARIN SODIUM 5 MG/1
5 TABLET ORAL ONCE
Status: COMPLETED | OUTPATIENT
Start: 2020-01-02 | End: 2020-01-02

## 2020-01-02 RX ORDER — MORPHINE SULFATE 2 MG/ML
4 INJECTION, SOLUTION INTRAMUSCULAR; INTRAVENOUS EVERY 6 HOURS PRN
Status: DISCONTINUED | OUTPATIENT
Start: 2020-01-02 | End: 2020-01-05

## 2020-01-02 RX ORDER — POTASSIUM CHLORIDE 20 MEQ/15ML
20 SOLUTION ORAL ONCE
Status: COMPLETED | OUTPATIENT
Start: 2020-01-02 | End: 2020-01-02

## 2020-01-02 RX ORDER — WARFARIN 7.5 MG/1
7.5 TABLET ORAL DAILY
Status: DISCONTINUED | OUTPATIENT
Start: 2020-01-03 | End: 2020-01-10

## 2020-01-02 RX ADMIN — FUROSEMIDE 30 MG/HR: 10 INJECTION, SOLUTION INTRAMUSCULAR; INTRAVENOUS at 12:01

## 2020-01-02 RX ADMIN — DOBUTAMINE IN DEXTROSE 2.5 MCG/KG/MIN: 200 INJECTION, SOLUTION INTRAVENOUS at 12:01

## 2020-01-02 RX ADMIN — HYDRALAZINE HYDROCHLORIDE AND ISOSORBIDE DINITRATE 1 TABLET: 37.5; 2 TABLET, FILM COATED ORAL at 09:01

## 2020-01-02 RX ADMIN — ASPIRIN 81 MG: 81 TABLET, COATED ORAL at 09:01

## 2020-01-02 RX ADMIN — MORPHINE SULFATE 1 MG: 2 INJECTION, SOLUTION INTRAMUSCULAR; INTRAVENOUS at 09:01

## 2020-01-02 RX ADMIN — FERROUS SULFATE TAB EC 325 MG (65 MG FE EQUIVALENT) 325 MG: 325 (65 FE) TABLET DELAYED RESPONSE at 09:01

## 2020-01-02 RX ADMIN — HYDRALAZINE HYDROCHLORIDE AND ISOSORBIDE DINITRATE 1 TABLET: 37.5; 2 TABLET, FILM COATED ORAL at 08:01

## 2020-01-02 RX ADMIN — METHOCARBAMOL TABLETS 500 MG: 500 TABLET, COATED ORAL at 01:01

## 2020-01-02 RX ADMIN — FUROSEMIDE 30 MG/HR: 10 INJECTION, SOLUTION INTRAMUSCULAR; INTRAVENOUS at 03:01

## 2020-01-02 RX ADMIN — POTASSIUM CHLORIDE 20 MEQ: 20 SOLUTION ORAL at 05:01

## 2020-01-02 RX ADMIN — MORPHINE SULFATE 4 MG: 2 INJECTION, SOLUTION INTRAMUSCULAR; INTRAVENOUS at 04:01

## 2020-01-02 RX ADMIN — WARFARIN SODIUM 5 MG: 5 TABLET ORAL at 05:01

## 2020-01-02 RX ADMIN — MENTHOL, METHYL SALICYLATE: 10; 15 CREAM TOPICAL at 08:01

## 2020-01-02 RX ADMIN — FUROSEMIDE 30 MG/HR: 10 INJECTION, SOLUTION INTRAMUSCULAR; INTRAVENOUS at 07:01

## 2020-01-02 RX ADMIN — FUROSEMIDE 30 MG/HR: 10 INJECTION, SOLUTION INTRAMUSCULAR; INTRAVENOUS at 09:01

## 2020-01-02 RX ADMIN — MORPHINE SULFATE 4 MG: 2 INJECTION, SOLUTION INTRAMUSCULAR; INTRAVENOUS at 09:01

## 2020-01-02 NOTE — PT/OT/SLP PROGRESS
Physical Therapy      Patient Name:  Hamlet Terrell   MRN:  [id number]    Patient not seen today secondary to Pain(Pt declining mobility and participation in PT this date 2* R hip pain.). Will follow-up at next scheduled session as able.    Eboni Pittman, PT, DPT   1/2/2020  [phone]

## 2020-01-02 NOTE — PLAN OF CARE
Patient free of falls/traumas/injuries.  gtts infusing @ 2 mcg/kg/min. Lasix gtts infusing @ 30 mg/hr. Pain managed with PRN morphine 1 mg IVP. K+ 3.8 40 mEq K+ PO admin. Mg 2.3. CAMI hose in place. US of bilateral extremities performed. Skin clean, dry, and intact. Patient educated on plan of care. Pt with minimal conversation. Patients VS stable and no distress. Will continue to monitor.

## 2020-01-02 NOTE — PROGRESS NOTES
Progress Note  Hospital Medicine    Provider team: [facility]  Admit Date: 12/24/2019  Encounter Date: 01/02/2020     SUBJECTIVE:     Follow-up Visit for: Acute on chronic combined systolic and diastolic congestive heart failure    HPI (See H&P for complete P,F,SHx):  54M with chronic combined systolic and diastolic heart failure (EF 25%), afib on Coumadin, CAD, CVA, and history of PE who presents to the ED for evaluation of shortness of breath and edema.  He was just admitted to Hospital Medicine from 11/24-12/8 for a CHF exacerbation.  During that admission, he was evaluated by Cardiology who started him on Dobutamine and Diuril.  He was resistant to HTS evaluation during that stay, which included recommendations for LVAD, transplant, and ICD.  He was discharged home on Lasix 80mg PO BID.  He endorses compliance with his diuretic regimen, but despite this and following a low salt diet, he has continued to gain weight, have lower extremity and abdominal swelling, as well as shortness of breath.  He is so short of breath that he has to sleep in a chair, and has been unable to sleep because of his breathing.  He denies any chest pain.  He does have a cough that is mostly dry, but occasionally productive of blood tinged sputum.  He was discharged weight a weight of 259lbs, and claims that is around his dry weight.  He is currently 286lbs.       Upon arrival to the ED, vitals were /83, HR 88, RR 22.  Labs showed Hemoglobin 7.3, INR 1.5, Creatinine 1.8, Alk Phos 278,  with Trop 0.128. CXR showed cardiomegaly and pulmonary edema, along with a masslike opacity. CT chest was attempted to be ordered to evaluate the mass, but he was unable to lie flat to get it evaluated.  He was given Lasix 80mg IV and was admitted to Hospital Medicine for further management.    TTE 11/27/2019  · Severely decreased left ventricular systolic function.   · The estimated ejection fraction is 25%  · Concentric left ventricular  "hypertrophy.  · Global hypokinetic wall motion.  · Moderate right ventricular enlargement.  · Moderately reduced right ventricular systolic function.  · Severe biatrial enlargement.  · Moderate mitral regurgitation.  · Moderate tricuspid regurgitation.  · The estimated PA systolic pressure is 40 mm Hg  · Elevated central venous pressure (15 mm Hg).  · Atrial fibrillation observed.    Interval history:  Upset about his R side pain that he attributes to the "drips" he is on. No abnormalities noted on exam. Requesting Bengay. Weight stable since yesterday. Continuing Lasix and  gtt's.    Wt Readings from Last 30 Encounters:   01/02/20 125.1 kg (275 lb 12.7 oz)   12/07/19 117.6 kg (259 lb 4.2 oz)   10/23/19 108.9 kg (240 lb)   09/09/19 108.9 kg (240 lb)   08/04/19 105.2 kg (232 lb)   07/18/19 108.2 kg (238 lb 8.6 oz)   06/20/19 118.9 kg (262 lb 2 oz)   05/21/19 121.1 kg (267 lb)   05/16/19 116.3 kg (256 lb 6.3 oz)   04/20/19 127.5 kg (281 lb 1.4 oz)   01/31/19 113.4 kg (250 lb)   11/04/18 123.1 kg (271 lb 6.2 oz)   08/31/18 119.6 kg (263 lb 10.7 oz)   07/28/18 113.4 kg (250 lb)   07/10/18 120.7 kg (266 lb)   05/03/18 117.9 kg (259 lb 14.8 oz)   03/30/18 117.9 kg (260 lb)   02/21/18 117.9 kg (260 lb)   01/12/18 113.4 kg (250 lb)   12/10/17 117.9 kg (260 lb)   11/11/17 113.9 kg (251 lb)   11/01/17 111.8 kg (246 lb 7.6 oz)   10/26/17 118.5 kg (261 lb 3.2 oz)   08/13/17 117.9 kg (260 lb)   08/12/17 117.9 kg (260 lb)   05/10/17 117.9 kg (260 lb)   04/05/17 108.4 kg (239 lb 1.4 oz)   02/01/17 108.9 kg (240 lb)   12/15/16 119.4 kg (263 lb 3.7 oz)   08/26/16 112.5 kg (248 lb)         Review of Systems:  Review of Systems   Constitutional: Negative for chills and fever.   HENT: Negative for congestion and sore throat.    Eyes: Negative for photophobia, pain and discharge.   Respiratory: Positive for shortness of breath. Negative for cough, hemoptysis and sputum production.    Cardiovascular: Positive for orthopnea, leg " swelling and PND. Negative for chest pain and palpitations.   Gastrointestinal: Negative for abdominal pain, diarrhea, nausea and vomiting.   Genitourinary: Negative for dysuria and urgency.   Musculoskeletal: Negative for myalgias and neck pain.   Skin: Negative for itching and rash.   Neurological: Negative for sensory change, focal weakness and headaches.   Endo/Heme/Allergies: Negative for polydipsia. Does not bruise/bleed easily.   Psychiatric/Behavioral: Negative for depression and suicidal ideas.     Past Medical History:   Diagnosis Date    Anticoagulant long-term use     Cardiomegaly     Chronic combined systolic and diastolic congestive heart failure     Coronary artery disease     Heart attack 2006    Hypertension     Hyperthyroidism, subclinical 1/2/2013    MI (myocardial infarction) 9/22/2013    MI in 2009      Paroxysmal atrial fibrillation     PE (pulmonary embolism) 1/1/2013    IN 2010     S/P ablation of atrial flutter 2008    Stroke 2009    no residual weaknesses     Social History     Socioeconomic History    Marital status: Single     Spouse name: Not on file    Number of children: Not on file    Years of education: Not on file    Highest education level: Not on file   Occupational History    Not on file   Social Needs    Financial resource strain: Not on file    Food insecurity:     Worry: Not on file     Inability: Not on file    Transportation needs:     Medical: Not on file     Non-medical: Not on file   Tobacco Use    Smoking status: Never Smoker    Smokeless tobacco: Never Used   Substance and Sexual Activity    Alcohol use: No    Drug use: No    Sexual activity: Never   Lifestyle    Physical activity:     Days per week: Not on file     Minutes per session: Not on file    Stress: Not on file   Relationships    Social connections:     Talks on phone: Not on file     Gets together: Not on file     Attends [employer] service: Not on file     Active member of club or  "organization: Not on file     Attends meetings of clubs or organizations: Not on file     Relationship status: Not on file   Other Topics Concern    Not on file   Social History Narrative    Not on file       OBJECTIVE:       Intake/Output Summary (Last 24 hours) at 1/2/2020 1622  Last data filed at 1/2/2020 0900  Gross per 24 hour   Intake 1200 ml   Output 1700 ml   Net -500 ml     Vital Signs Range (Last 24H):  Temp:  [96.2 °F (35.7 °C)-98.7 °F (37.1 °C)]   Pulse:  []   Resp:  [17-20]   BP: (106-125)/(55-68)   SpO2:  [91 %-96 %]   Body mass index is 40.73 kg/m².    Objective:  General Appearance:  Comfortable, well-appearing, in no acute distress and not in pain.    Vital signs: (most recent): Blood pressure 119/61, pulse (!) 113, temperature 97.9 °F (36.6 °C), resp. rate 20, height 5' 9" (1.753 m), weight 125.1 kg (275 lb 12.7 oz), SpO2 (!) 93 %.  No fever.    Output: Producing urine and producing stool.    HEENT: Normal HEENT exam.  (+JVD)    Lungs:  Normal effort.  Breath sounds clear to auscultation.  He is not in respiratory distress.  There are rales.  No wheezes.    Heart: Normal rate.  Regular rhythm.  S1 normal and S2 normal.  Positive for murmur.    Chest: Symmetric chest wall expansion.   Abdomen: Abdomen is soft.  Bowel sounds are normal.   There is no abdominal tenderness.     Extremities: Normal range of motion.  There is venous stasis and dependent edema.  There is no deformity, effusion or local swelling.    Pulses: Distal pulses are intact.    Neurological: Patient is alert and oriented to person, place and time.  Normal strength.    Pupils:  Pupils are equal, round, and reactive to light.    Skin:  Warm and dry.      Medications:  Medication list was reviewed in EPIC and changes noted under Assessment/Plan and MAR.    Laboratory:  Recent Labs     01/02/20  0821   WBC 6.75   RBC 2.97*   HGB 7.3*   HCT 25.8*      MCV 87   MCH 24.6*   MCHC 28.3*   GRAN 63.8  4.3   LYMPH 8.3*  0.6* "   MONO 19.6*  1.3*   EOS 0.5      Recent Labs     01/02/20  0821   *      K 3.8   CL 95   CO2 32*   BUN 80*   CREATININE 2.5*   CALCIUM 9.9   ANIONGAP 13   MG 2.2   PHOS 3.5     Prior records reviewed.    ECHO reviewed:  · Severely decreased left ventricular systolic function. The estimated ejection fraction is 25%  · Concentric left ventricular hypertrophy.  · Global hypokinetic wall motion.  · Moderate right ventricular enlargement.  · Moderately reduced right ventricular systolic function.  · Severe biatrial enlargement.  · Moderate mitral regurgitation.  · Moderate tricuspid regurgitation.  · The estimated PA systolic pressure is 40 mm Hg  · Elevated central venous pressure (15 mm Hg).  · Atrial fibrillation observed.      Imaging reviewed  Imaging Results          CT Chest Without Contrast (No Result on File)                X-Ray Chest AP Portable (Final result)  Result time 12/24/19 19:09:07    Final result by Zeb Hirsch MD (12/24/19 19:09:07)                 Impression:      Masslike opacity overlying the right lower lung zone, similar to prior exam.  Right pleural fluid not excluded.  Chest CT recommended for further evaluation of right lung masslike opacity.    Patchy bilateral pulmonary opacities, similar to prior exam.    Cardiomegaly.    Electronically signed by resident: Cristofer Reddy  Date:    12/24/2019  Time:    18:48    Electronically signed by: Zeb Hirsch MD  Date:    12/24/2019  Time:    19:09             Narrative:    EXAMINATION:  XR CHEST AP PORTABLE    CLINICAL HISTORY:  CHF;    TECHNIQUE:  Single frontal view of the chest was performed.    COMPARISON:  Chest radiograph 12/03/2019    FINDINGS:  Cardiac leads overlie the chest wall.    Redemonstration of extensive bilateral patchy opacities, more prominent on the right with a focal area of masslike opacity in the right lower lung zone.  No significant change from prior exam.  Left costophrenic angle is visible and right  sided pleural fluid not excluded noting presence of opacification.  No pneumothorax.    The cardiac silhouette is enlarged.  Hilar and mediastinal contours are unchanged.    Bones are intact.                                  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 [I13.0]  Yes    Elevated alkaline phosphatase level [R74.8]  Yes    Coronary artery disease [I25.10]  Yes     Chronic    Stage 3 chronic kidney disease [N18.3]  Yes    Subtherapeutic international normalized ratio (INR) [R79.1]  Yes    Personal history of cerebral embolism [Z86.79]  Not Applicable    Personal history of venous thrombosis and embolism [Z86.718]  Not Applicable    Personal history of MI (myocardial infarction) [I25.2]  Not Applicable    Essential hypertension [I10]  Yes     Chronic    Atrial fibrillation, chronic [I48.20]  Yes     Chronic    Chronic anticoagulation [Z79.01]  Not Applicable     Chronic    Cardiomyopathy [I42.9]  Yes     Chronic      Resolved Hospital Problems   No resolved problems to display.      Acute on Chronic Combined Systolic and Diastolic Heart Failure  Cardiomyopathy  · CHF Pathway initiated  · Last 2D echo Nov 2019 with EF 25%  ·  and CXR with pulmonary edema and cardiomegaly  · Started on Lasix gtt. Received several doses of IV Diuril for augmentation but diuresis was limited. Now responding well after Lasix gtt increased to 30 mg/hr. Cont  gtt 2.5.   · Extensive conversations with patient - he is agreeable to palliative care discussion  · Goal diuresis 2-3L/day.  Home diuretic dose:  Lasix 80mg PO BID  · BID BMP (with morning labs and at 4pm)  · Strict I&Os with daily weights.  Dry weight 232 lb?  Admit weight 286lbs.  · Hold BB, okay to c/w BiDil     JEAN-PIERRE on CKD  - Baseline 1.8  - Cardiorenal, improving w/ diuresis  and     Chronic AFib  Long Term Use of Anticoagulants  Subtherapeutic INR  · Chronic issue  · HR in 100-120's. Resume BB  · Continue Warfarin at increased dose - Pharmacy consulted for dosing management     Essential HTN  · Chronic and stable  · Continue Bidil BID  · Resume BB     CAD  History of MI  · Chronic and stable  · Continue Aspirin 81mg PO daily  · Continue Bidil BID  · Resume BB     Anemia  · Hgb > 7  · Received 5 units PRBCs on prior hospital admission  · Continue PO iron  · Type and screen obtained  · Monitor for bleeding/need for transfusion     History of VTE  History of Stroke  · Chronic and stable  · Continue Aspirin 81mg PO daily  · Continue Warfarin as above     Stage 3 CKD  · Chronic and stable  · Monitor with diuresis     Elevated Alk Phos  · Chronic and stable  · Monitor     Benign Hypertensive Heart and Kidney Disease with HF and CKD  · As above     Abnormal CT  · Needs CT chest when able to lie flat or outpt to evaluate masslike opacity seen on CXR     Epistaxis  - likely from o2; use humidified o2 only  - afrin ordered  - avoid nose blowing or nose picking     Diet:  Low Sodium, 1.2L fluid restriction  VTE PPx:  Warfarin  Goals of Care:  Full     High Risk Conditions:   Patient is currently on drug therapy requiring intensive monitoring for toxicity: Lasix gtt     Anticipated discharge date and disposition:   Pending diuresis    Markus Howard MD  [facility]

## 2020-01-02 NOTE — NURSING
Brought patient 2100 Bidil. Patient sitting in chair under blankets, refused to take medicine and instead preferred medicine to be placed on bed and he would take it later.

## 2020-01-02 NOTE — NURSING
Patient had 10 beat run of vtacfh on monitor. Asymptomatic with VSS. Notified Dr. Douglas Lenz with [facility] who gave verbal to monitor for Vtach of 20 beats or symptomatic.

## 2020-01-02 NOTE — PLAN OF CARE
Goals reviewed and remain appropriate.   Pat Christianson OTR/L  Pager #: [phone]  1/2/2020    Problem: Occupational Therapy Goal  Goal: Occupational Therapy Goal  Description  Goals to be met by 1/13/20:    Patient will increase functional independence with ADLs by performing:    LE Dressing with Supervision.  Grooming while standing with Supervision.  Toileting from toilet with Supervision for hygiene and clothing management.   Toilet transfer to toilet with Supervision.     Outcome: Ongoing, Progressing

## 2020-01-02 NOTE — PLAN OF CARE
CM to bedside for d/c planning reassessment. Pt sitting up in the dark. Pt guarded when asked questions about home health and coumadin monitoring. Per patient, he has not had home health and does not have coumadin monitoring.  This is inconsistent with information in chart. Pt has been followed in the past by [facility]madin clinic.  Pt is familiar to some members of care team.    Pt states he would like a walker when d/c.               01/02/20 1344   Discharge Reassessment   Assessment Type Discharge Planning Reassessment   Provided patient/caregiver education on the expected discharge date and the discharge plan Yes   Do you have any problems affording any of your prescribed medications? No   Discharge Plan A Home Health   Discharge Plan B Home   DME Needed Upon Discharge  walker, rolling  (Pt would like a walker)   Anticipated Discharge Disposition Home-Health     Julie Haase RN  Case Management [phone]

## 2020-01-02 NOTE — CONSULTS
Palliative Care Consult.    Consult received and discussed with Dr. Howard. Palliative care will review chart and see patient in a.m. Thank you for the consult.    DORCAS Barrera, FNP-C

## 2020-01-02 NOTE — PT/OT/SLP PROGRESS
"Occupational Therapy   Treatment    Name: Hamlet Terrell  MRN: [id number]  Admitting Diagnosis:  Acute on chronic combined systolic and diastolic congestive heart failure       Recommendations:     Discharge Recommendations: home with home health  Discharge Equipment Recommendations:  (TBD)  Barriers to discharge:  Decreased caregiver support    Assessment:     Hamlet Terrell is a 54 y.o. male with a medical diagnosis of Acute on chronic combined systolic and diastolic congestive heart failure. He presents with the following performance deficits affecting function: weakness, impaired self care skills, impaired functional mobilty, impaired balance, pain, impaired cardiopulmonary response to activity. Patient c/o severe pain in R hip but would not rate pain when therapist asked patient to rate pain. Patient's participation limited this session as patient reported being unable to stand or ambulate at this time. At this time, patient will continue to benefit from acute skilled therapy intervention to address deficits/underlying impairments and progress towards prior level of function. After discharge, patient will benefit from receiving home health therapy services to ensure safety and independence in the home environment.     Rehab Prognosis:  Good; patient would benefit from acute skilled OT services to address these deficits and reach maximum level of function.       Plan:     Patient to be seen 3 x/week to address the above listed problems via self-care/home management, therapeutic activities, therapeutic exercises  · Plan of Care Expires: 01/29/20  · Plan of Care Reviewed with: patient    Subjective     Patient stated "I'll cry if I stand up".    Patient stated "I really wish they would hurry up with this ultrasound".    Pain/Comfort:  · Pain Rating 1: (Patient c/o pain in R hip but would not rate pain.)  · Pain Addressed 1: Pre-medicate for activity, Reposition, Distraction    Objective:     Communicated with: RN " prior to session. Patient found up in chair with telemetry, peripheral IV upon OT entry to room.    General Precautions: Standard, fall   Orthopedic Precautions:N/A   Braces: N/A     Occupational Performance:     Functional Mobility/Transfers:  · Patient declined standing and ambulating this session 2* R hip pain    Activities of Daily Living:  · Grooming: modified independence/set up assist sitting in chair  · Patient declined standing to complete task  · Lower Body Dressing: patient would not attempt 2* pain   · Therapist educated patient on AE that could assist patient at home, including sock aide and reacher, and showed patient video.    [facility] 6 Click ADL: 19    Treatment & Education:   Therapist provided facilitation and instruction of proper body mechanics, energy conservation, and fall prevention strategies during tasks listed above.   Positioned patient will pillow behind back to improve posture and patient noted slight relief in R hip pain.   Patient performed 1x10 B UE shoulder flexion AROM exercises and therapist instructed patient to complete throughout the day to improve activity tolerance. SOB noted requiring rest period following. Therapist demonstrated elbow flexion/extension and forward punching AROM exercises and instructed patient to perform in addition to shoulder flexion. Patient verbalized understanding of these but did not want to demonstrate at this time.   Instructed patient to use call light to have nursing staff assist with transfers.    Educated patient on OT POC and answered all questions within OT scope of practice.   Whiteboard updated     Patient left up in chair with all lines intact and call button in reachEducation:      GOALS:   Multidisciplinary Problems     Occupational Therapy Goals        Problem: Occupational Therapy Goal    Goal Priority Disciplines Outcome Interventions   Occupational Therapy Goal     OT, PT/OT Ongoing, Progressing    Description:  Goals to be met by  1/13/20:    Patient will increase functional independence with ADLs by performing:    LE Dressing with Supervision.  Grooming while standing with Supervision.  Toileting from toilet with Supervision for hygiene and clothing management.   Toilet transfer to toilet with Supervision.                      Time Tracking:     OT Date of Treatment: 01/02/20  OT Start Time: 1100  OT Stop Time: 1124  OT Total Time (min): 24 min    Billable Minutes:Therapeutic Activity 24    Pat Christianson OT  1/2/2020

## 2020-01-02 NOTE — PLAN OF CARE
Plan of care discussed with patient; verbalized understanding using teach-back method. No acute events overnight. Patient remained free of falls and injuries. Continuing dobutamine 5mcg. Continuing Lasix 30mg. Monitoring daily weights and strict I's and O's. Fluid restriction discussed. CAMI's in place. Asymptomatic Vtach overnight. Denies having SoB or CP. VSS. Will continue to monitor.

## 2020-01-02 NOTE — NURSING
"Notified Dr. Howard that pt refused 1400 BMP even after education on importance of getting labs drawn. Pt said "yall need to give me a break." No new orders.  "

## 2020-01-02 NOTE — NURSING TRANSFER
Nursing Transfer Note      1/2/2020     Transfer To: [facility]    Transfer via stretcher    Transfer with cardiac monitoring    Transported by transport    Medicines sent: u    Chart send with patient: Yes

## 2020-01-03 PROBLEM — Z71.89 COUNSELING REGARDING ADVANCED CARE PLANNING AND GOALS OF CARE: Status: ACTIVE | Noted: 2020-01-03

## 2020-01-03 PROBLEM — Z51.5 PALLIATIVE CARE ENCOUNTER: Status: ACTIVE | Noted: 2020-01-03

## 2020-01-03 LAB
ALBUMIN SERPL BCP-MCNC: 3.3 G/DL (ref 3.5–5.2)
ALP SERPL-CCNC: 253 U/L (ref 55–135)
ALT SERPL W/O P-5'-P-CCNC: 15 U/L (ref 10–44)
ANION GAP SERPL CALC-SCNC: 11 MMOL/L (ref 8–16)
ANION GAP SERPL CALC-SCNC: 12 MMOL/L (ref 8–16)
AST SERPL-CCNC: 25 U/L (ref 10–40)
BASOPHILS # BLD AUTO: 0.04 K/UL (ref 0–0.2)
BASOPHILS NFR BLD: 0.6 % (ref 0–1.9)
BILIRUB SERPL-MCNC: 1.4 MG/DL (ref 0.1–1)
BUN SERPL-MCNC: 84 MG/DL (ref 6–20)
BUN SERPL-MCNC: 85 MG/DL (ref 6–20)
CALCIUM SERPL-MCNC: 9.8 MG/DL (ref 8.7–10.5)
CALCIUM SERPL-MCNC: 9.9 MG/DL (ref 8.7–10.5)
CHLORIDE SERPL-SCNC: 95 MMOL/L (ref 95–110)
CHLORIDE SERPL-SCNC: 95 MMOL/L (ref 95–110)
CO2 SERPL-SCNC: 35 MMOL/L (ref 23–29)
CO2 SERPL-SCNC: 35 MMOL/L (ref 23–29)
CREAT SERPL-MCNC: 2.4 MG/DL (ref 0.5–1.4)
CREAT SERPL-MCNC: 2.6 MG/DL (ref 0.5–1.4)
DIFFERENTIAL METHOD: ABNORMAL
EOSINOPHIL # BLD AUTO: 0.7 K/UL (ref 0–0.5)
EOSINOPHIL NFR BLD: 10.1 % (ref 0–8)
ERYTHROCYTE [DISTWIDTH] IN BLOOD BY AUTOMATED COUNT: 23.3 % (ref 11.5–14.5)
EST. GFR  (AFRICAN AMERICAN): 31.2 ML/MIN/1.73 M^2
EST. GFR  (AFRICAN AMERICAN): 34.3 ML/MIN/1.73 M^2
EST. GFR  (NON AFRICAN AMERICAN): 26.9 ML/MIN/1.73 M^2
EST. GFR  (NON AFRICAN AMERICAN): 29.7 ML/MIN/1.73 M^2
GLUCOSE SERPL-MCNC: 102 MG/DL (ref 70–110)
GLUCOSE SERPL-MCNC: 133 MG/DL (ref 70–110)
HCT VFR BLD AUTO: 24.3 % (ref 40–54)
HGB BLD-MCNC: 7 G/DL (ref 14–18)
IMM GRANULOCYTES # BLD AUTO: 0.02 K/UL (ref 0–0.04)
IMM GRANULOCYTES NFR BLD AUTO: 0.3 % (ref 0–0.5)
INR PPP: 3 (ref 0.8–1.2)
LYMPHOCYTES # BLD AUTO: 0.6 K/UL (ref 1–4.8)
LYMPHOCYTES NFR BLD: 9.5 % (ref 18–48)
MAGNESIUM SERPL-MCNC: 2.2 MG/DL (ref 1.6–2.6)
MCH RBC QN AUTO: 24.6 PG (ref 27–31)
MCHC RBC AUTO-ENTMCNC: 28.8 G/DL (ref 32–36)
MCV RBC AUTO: 85 FL (ref 82–98)
MONOCYTES # BLD AUTO: 1.3 K/UL (ref 0.3–1)
MONOCYTES NFR BLD: 20.2 % (ref 4–15)
NEUTROPHILS # BLD AUTO: 3.9 K/UL (ref 1.8–7.7)
NEUTROPHILS NFR BLD: 59.3 % (ref 38–73)
NRBC BLD-RTO: 0 /100 WBC
PHOSPHATE SERPL-MCNC: 3.8 MG/DL (ref 2.7–4.5)
PLATELET # BLD AUTO: 208 K/UL (ref 150–350)
PMV BLD AUTO: 9.7 FL (ref 9.2–12.9)
POTASSIUM SERPL-SCNC: 3.8 MMOL/L (ref 3.5–5.1)
POTASSIUM SERPL-SCNC: 4.3 MMOL/L (ref 3.5–5.1)
PROT SERPL-MCNC: 8.7 G/DL (ref 6–8.4)
PROTHROMBIN TIME: 28.1 SEC (ref 9–12.5)
RBC # BLD AUTO: 2.85 M/UL (ref 4.6–6.2)
SODIUM SERPL-SCNC: 141 MMOL/L (ref 136–145)
SODIUM SERPL-SCNC: 142 MMOL/L (ref 136–145)
WBC # BLD AUTO: 6.55 K/UL (ref 3.9–12.7)

## 2020-01-03 PROCEDURE — 99233 PR SUBSEQUENT HOSPITAL CARE,LEVL III: ICD-10-PCS | Mod: ,,, | Performed by: NURSE PRACTITIONER

## 2020-01-03 PROCEDURE — 36415 COLL VENOUS BLD VENIPUNCTURE: CPT

## 2020-01-03 PROCEDURE — 85610 PROTHROMBIN TIME: CPT

## 2020-01-03 PROCEDURE — 80053 COMPREHEN METABOLIC PANEL: CPT

## 2020-01-03 PROCEDURE — 25000003 PHARM REV CODE 250: Performed by: HOSPITALIST

## 2020-01-03 PROCEDURE — 84100 ASSAY OF PHOSPHORUS: CPT

## 2020-01-03 PROCEDURE — 20600001 HC STEP DOWN PRIVATE ROOM

## 2020-01-03 PROCEDURE — 99233 PR SUBSEQUENT HOSPITAL CARE,LEVL III: ICD-10-PCS | Mod: ,,, | Performed by: HOSPITALIST

## 2020-01-03 PROCEDURE — 83735 ASSAY OF MAGNESIUM: CPT

## 2020-01-03 PROCEDURE — 85025 COMPLETE CBC W/AUTO DIFF WBC: CPT

## 2020-01-03 PROCEDURE — 63600175 PHARM REV CODE 636 W HCPCS: Performed by: HOSPITALIST

## 2020-01-03 PROCEDURE — 99233 SBSQ HOSP IP/OBS HIGH 50: CPT | Mod: ,,, | Performed by: NURSE PRACTITIONER

## 2020-01-03 PROCEDURE — 80048 BASIC METABOLIC PNL TOTAL CA: CPT

## 2020-01-03 PROCEDURE — 99233 SBSQ HOSP IP/OBS HIGH 50: CPT | Mod: ,,, | Performed by: HOSPITALIST

## 2020-01-03 RX ORDER — FUROSEMIDE 10 MG/ML
120 INJECTION INTRAMUSCULAR; INTRAVENOUS 2 TIMES DAILY
Status: DISCONTINUED | OUTPATIENT
Start: 2020-01-03 | End: 2020-01-04

## 2020-01-03 RX ORDER — POTASSIUM CHLORIDE 20 MEQ/15ML
40 SOLUTION ORAL ONCE
Status: COMPLETED | OUTPATIENT
Start: 2020-01-03 | End: 2020-01-03

## 2020-01-03 RX ADMIN — HYDRALAZINE HYDROCHLORIDE AND ISOSORBIDE DINITRATE 1 TABLET: 37.5; 2 TABLET, FILM COATED ORAL at 08:01

## 2020-01-03 RX ADMIN — FUROSEMIDE 30 MG/HR: 10 INJECTION, SOLUTION INTRAMUSCULAR; INTRAVENOUS at 07:01

## 2020-01-03 RX ADMIN — MORPHINE SULFATE 4 MG: 2 INJECTION, SOLUTION INTRAMUSCULAR; INTRAVENOUS at 03:01

## 2020-01-03 RX ADMIN — FERROUS SULFATE TAB EC 325 MG (65 MG FE EQUIVALENT) 325 MG: 325 (65 FE) TABLET DELAYED RESPONSE at 08:01

## 2020-01-03 RX ADMIN — METHOCARBAMOL TABLETS 500 MG: 500 TABLET, COATED ORAL at 08:01

## 2020-01-03 RX ADMIN — MORPHINE SULFATE 4 MG: 2 INJECTION, SOLUTION INTRAMUSCULAR; INTRAVENOUS at 11:01

## 2020-01-03 RX ADMIN — POTASSIUM CHLORIDE 40 MEQ: 20 SOLUTION ORAL at 11:01

## 2020-01-03 RX ADMIN — HYDRALAZINE HYDROCHLORIDE AND ISOSORBIDE DINITRATE 1 TABLET: 37.5; 2 TABLET, FILM COATED ORAL at 09:01

## 2020-01-03 RX ADMIN — MORPHINE SULFATE 4 MG: 2 INJECTION, SOLUTION INTRAMUSCULAR; INTRAVENOUS at 06:01

## 2020-01-03 RX ADMIN — ASPIRIN 81 MG: 81 TABLET, COATED ORAL at 08:01

## 2020-01-03 RX ADMIN — FUROSEMIDE 120 MG: 10 INJECTION, SOLUTION INTRAMUSCULAR; INTRAVENOUS at 04:01

## 2020-01-03 RX ADMIN — DOBUTAMINE IN DEXTROSE 2.5 MCG/KG/MIN: 200 INJECTION, SOLUTION INTRAVENOUS at 11:01

## 2020-01-03 RX ADMIN — MENTHOL, METHYL SALICYLATE: 10; 15 CREAM TOPICAL at 09:01

## 2020-01-03 RX ADMIN — WARFARIN SODIUM 7.5 MG: 7.5 TABLET ORAL at 04:01

## 2020-01-03 RX ADMIN — ONDANSETRON 8 MG: 2 INJECTION INTRAMUSCULAR; INTRAVENOUS at 03:01

## 2020-01-03 NOTE — PROGRESS NOTES
"[facility]  Adult Nutrition  Progress Note    SUMMARY       Recommendations    Recommendation/Intervention:   1. Continue current diet order as tolerated with oral supplement as needed.   2. Encouraged good PO intake of meals. Will monitor.   Goals: Pt to meet % EEN and EPN by RD follow-up.   Nutrition Goal Status: new  Communication of RD Recs: other (comment)(POC)    Reason for Assessment    Reason For Assessment: length of stay  Diagnosis: cardiac disease(HF)  Relevant Medical History: HF, CAD  General Information Comments: Pt reports eating % of meals. Declines any wt loss unrelated to fluid. Noted dry weight is normally 259lb. Pt appear nourished with no signs of malnutrition at this time.   Nutrition Discharge Planning: Adequate PO intake for optimal nutrition.     Nutrition/Diet History    Spiritual, Cultural Beliefs, Alevism Practices, Values that Affect Care: no  Factors Affecting Nutritional Intake: None identified at this time    Anthropometrics    Temp: 98 °F (36.7 °C)  Height Method: Stated  Height: 5' 9" (175.3 cm)  Height (inches): 69 in  Weight Method: Standard Scale  Weight: 125.1 kg (275 lb 12.7 oz)  Weight (lb): 275.8 lb  Ideal Body Weight (IBW), Male: 160 lb  % Ideal Body Weight, Male (lb): 177.89 %  BMI (Calculated): 40.7  BMI Grade: greater than 40 - morbid obesity  Usual Body Weight (UBW), k kg(dry wt)  % Usual Body Weight: 106.24  Weight Loss Since Admission: (fluid related)     Lab/Procedures/Meds    Pertinent Labs Reviewed: reviewed  Pertinent Medications Reviewed: reviewed  Pertinent Medications Comments: ferrous sulfate, lasix, coumadin    Estimated/Assessed Needs    Weight Used For Calorie Calculations: 125.1 kg (275 lb 12.7 oz)  Energy Calorie Requirements (kcal): 2294  Energy Need Method: Pickaway-St Jeor(1.1 PAL)  Protein Requirements: 125-150g (1-1.2g/kg)  Weight Used For Protein Calculations: 125.1 kg (275 lb 12.7 oz)  Fluid Requirements (mL): " Per MD  RDA Method (mL): 2294     Nutrition Prescription Ordered    Current Diet Order: Low Sodium, Fluid 1200mL  Oral Nutrition Supplement: Boost Plus TID    Evaluation of Received Nutrient/Fluid Intake    I/O: -8.1L since admit  Comments: LBM 1/1  % Intake of Estimated Energy Needs: 75 - 100 %  % Meal Intake: 75 - 100 %    Nutrition Risk    Level of Risk/Frequency of Follow-up: low(1X/week)     Assessment and Plan    No nutrition related risk factor present at this time.     Monitor and Evaluation    Food and Nutrient Intake: energy intake, food and beverage intake  Food and Nutrient Adminstration: diet order  Anthropometric Measurements: weight, weight change  Biochemical Data, Medical Tests and Procedures: other (specify)(All labs)  Nutrition-Focused Physical Findings: overall appearance     Nutrition Follow-Up    RD Follow-up?: Yes

## 2020-01-03 NOTE — CHAPLAIN
"Visited w/pt for 45 min. He really opened up, venting a bit about past mistakes, negative people in his life; Note to SW, his "home life" isn't ideal.  He said he prays for healing, but doesn't identify with any Islam. He was open to and welcomed me to pray for/with him, held his hand. I demonstrated the deep breathing exercise again and he did it for about 5 min with my guidance. I started to wrap up visit at about 20 minutes, but encouraged me to stay.  "

## 2020-01-03 NOTE — HPI
Mr. Terrell is a 55 y/o male with PMHx of chronic combined systolic and diastolic heart failure (EF 25%), afib on Coumadin, CAD, CVA, and history of PE who presented to [facility] ED on 12/24 for evaluation of shortness of breath and edema. He was just admitted to Hospital Medicine from 11/24-12/8 for CHF exacerbation. During that admission, he was evaluated by Cardiology who started him on Dobutamine and Diuril. He was resistant to HTS evaluation during that stay, which included recommendations for LVAD, transplant, and ICD. He was discharged home on Lasix 80mg PO BID. He endorsed compliance with his diuretic regimen, but despite this and following a low salt diet, he continued to have dyspnea, orthopnea, edema and weight gain. He also complained of mostly dry cough with occasional production of blood tinged sputum.     CXR in the ED showed cardiomegaly and pulmonary edema, along with a masslike opacity. CT chest was ordered to evaluate the mass, but he was unable to lie flat to get it evaluated. He was given Lasix 80mg IV and was admitted to Hospital Medicine for further management. He was started on Lasix and  gtt.

## 2020-01-03 NOTE — PLAN OF CARE
01/03/20 1445   Post-Acute Status   Post-Acute Authorization Home Health/Hospice   Home Health/Hospice Status Referrals Sent     SW faxed referral to [facility] via  for review so that the hospice company can go have a conversation with Pt about hospice services. SW will continue to follow.     Mary Ellen Valencia, SHAYNE  [facility]  Ext. [phone]

## 2020-01-03 NOTE — PROGRESS NOTES
Progress Note  Hospital Medicine    Provider team: [facility]  Admit Date: 12/24/2019  Encounter Date: 01/03/2020     SUBJECTIVE:     Follow-up Visit for: Acute on chronic combined systolic and diastolic congestive heart failure    HPI (See H&P for complete P,F,SHx):  54M with chronic combined systolic and diastolic heart failure (EF 25%), afib on Coumadin, CAD, CVA, and history of PE who presents to the ED for evaluation of shortness of breath and edema.  He was just admitted to Hospital Medicine from 11/24-12/8 for a CHF exacerbation.  During that admission, he was evaluated by Cardiology who started him on Dobutamine and Diuril.  He was resistant to HTS evaluation during that stay, which included recommendations for LVAD, transplant, and ICD.  He was discharged home on Lasix 80mg PO BID.  He endorses compliance with his diuretic regimen, but despite this and following a low salt diet, he has continued to gain weight, have lower extremity and abdominal swelling, as well as shortness of breath.  He is so short of breath that he has to sleep in a chair, and has been unable to sleep because of his breathing.  He denies any chest pain.  He does have a cough that is mostly dry, but occasionally productive of blood tinged sputum.  He was discharged weight a weight of 259lbs, and claims that is around his dry weight.  He is currently 286lbs.       Upon arrival to the ED, vitals were /83, HR 88, RR 22.  Labs showed Hemoglobin 7.3, INR 1.5, Creatinine 1.8, Alk Phos 278,  with Trop 0.128. CXR showed cardiomegaly and pulmonary edema, along with a masslike opacity. CT chest was attempted to be ordered to evaluate the mass, but he was unable to lie flat to get it evaluated.  He was given Lasix 80mg IV and was admitted to Hospital Medicine for further management.    TTE 11/27/2019  · Severely decreased left ventricular systolic function.   · The estimated ejection fraction is 25%  · Concentric left ventricular  "hypertrophy.  · Global hypokinetic wall motion.  · Moderate right ventricular enlargement.  · Moderately reduced right ventricular systolic function.  · Severe biatrial enlargement.  · Moderate mitral regurgitation.  · Moderate tricuspid regurgitation.  · The estimated PA systolic pressure is 40 mm Hg  · Elevated central venous pressure (15 mm Hg).  · Atrial fibrillation observed.    Interval history:  Upset about his R side pain that he attributes to the "drips" he is on. No abnormalities noted on exam. Requesting Bengay. Weight stable since yesterday. Continuing Lasix and  gtt's.    Wt Readings from Last 30 Encounters:   01/02/20 125.1 kg (275 lb 12.7 oz)   12/07/19 117.6 kg (259 lb 4.2 oz)   10/23/19 108.9 kg (240 lb)   09/09/19 108.9 kg (240 lb)   08/04/19 105.2 kg (232 lb)   07/18/19 108.2 kg (238 lb 8.6 oz)   06/20/19 118.9 kg (262 lb 2 oz)   05/21/19 121.1 kg (267 lb)   05/16/19 116.3 kg (256 lb 6.3 oz)   04/20/19 127.5 kg (281 lb 1.4 oz)   01/31/19 113.4 kg (250 lb)   11/04/18 123.1 kg (271 lb 6.2 oz)   08/31/18 119.6 kg (263 lb 10.7 oz)   07/28/18 113.4 kg (250 lb)   07/10/18 120.7 kg (266 lb)   05/03/18 117.9 kg (259 lb 14.8 oz)   03/30/18 117.9 kg (260 lb)   02/21/18 117.9 kg (260 lb)   01/12/18 113.4 kg (250 lb)   12/10/17 117.9 kg (260 lb)   11/11/17 113.9 kg (251 lb)   11/01/17 111.8 kg (246 lb 7.6 oz)   10/26/17 118.5 kg (261 lb 3.2 oz)   08/13/17 117.9 kg (260 lb)   08/12/17 117.9 kg (260 lb)   05/10/17 117.9 kg (260 lb)   04/05/17 108.4 kg (239 lb 1.4 oz)   02/01/17 108.9 kg (240 lb)   12/15/16 119.4 kg (263 lb 3.7 oz)   08/26/16 112.5 kg (248 lb)     Review of Systems:  Review of Systems   Constitutional: Negative for chills and fever.   HENT: Negative for congestion and sore throat.    Eyes: Negative for photophobia, pain and discharge.   Respiratory: Positive for shortness of breath. Negative for cough, hemoptysis and sputum production.    Cardiovascular: Positive for orthopnea, leg swelling " and PND. Negative for chest pain and palpitations.   Gastrointestinal: Negative for abdominal pain, diarrhea, nausea and vomiting.   Genitourinary: Negative for dysuria and urgency.   Musculoskeletal: Negative for myalgias and neck pain.   Skin: Negative for itching and rash.   Neurological: Negative for sensory change, focal weakness and headaches.   Endo/Heme/Allergies: Negative for polydipsia. Does not bruise/bleed easily.   Psychiatric/Behavioral: Negative for depression and suicidal ideas.     Past Medical History:   Diagnosis Date    Anticoagulant long-term use     Cardiomegaly     Chronic combined systolic and diastolic congestive heart failure     Coronary artery disease     Heart attack 2006    Hypertension     Hyperthyroidism, subclinical 1/2/2013    MI (myocardial infarction) 9/22/2013    MI in 2009      Paroxysmal atrial fibrillation     PE (pulmonary embolism) 1/1/2013    IN 2010     S/P ablation of atrial flutter 2008    Stroke 2009    no residual weaknesses     Social History     Socioeconomic History    Marital status: Single     Spouse name: Not on file    Number of children: Not on file    Years of education: Not on file    Highest education level: Not on file   Occupational History    Not on file   Social Needs    Financial resource strain: Not on file    Food insecurity:     Worry: Not on file     Inability: Not on file    Transportation needs:     Medical: Not on file     Non-medical: Not on file   Tobacco Use    Smoking status: Never Smoker    Smokeless tobacco: Never Used   Substance and Sexual Activity    Alcohol use: No    Drug use: No    Sexual activity: Never   Lifestyle    Physical activity:     Days per week: Not on file     Minutes per session: Not on file    Stress: Not on file   Relationships    Social connections:     Talks on phone: Not on file     Gets together: Not on file     Attends Yazidism service: Not on file     Active member of club or  "organization: Not on file     Attends meetings of clubs or organizations: Not on file     Relationship status: Not on file   Other Topics Concern    Not on file   Social History Narrative    Not on file       OBJECTIVE:       Intake/Output Summary (Last 24 hours) at 1/3/2020 0830  Last data filed at 1/3/2020 0402  Gross per 24 hour   Intake 1494 ml   Output 1675 ml   Net -181 ml     Vital Signs Range (Last 24H):  Temp:  [97.8 °F (36.6 °C)-99.2 °F (37.3 °C)]   Pulse:  []   Resp:  [16-20]   BP: (111-143)/(56-74)   SpO2:  [93 %-98 %]   Body mass index is 40.73 kg/m².    Objective:  General Appearance:  Comfortable, well-appearing, in no acute distress and not in pain.    Vital signs: (most recent): Blood pressure 126/64, pulse 105, temperature 98 °F (36.7 °C), temperature source Oral, resp. rate 16, height 5' 9" (1.753 m), weight 125.1 kg (275 lb 12.7 oz), SpO2 (!) 94 %.  No fever.    Output: Producing urine and producing stool.    HEENT: Normal HEENT exam.  (+JVD)    Lungs:  Normal effort.  Breath sounds clear to auscultation.  He is not in respiratory distress.  There are rales.  No wheezes.    Heart: Normal rate.  Regular rhythm.  S1 normal and S2 normal.  Positive for murmur.    Chest: Symmetric chest wall expansion.   Abdomen: Abdomen is soft.  Bowel sounds are normal.   There is no abdominal tenderness.     Extremities: Normal range of motion.  There is venous stasis and dependent edema.  There is no deformity, effusion or local swelling.    Pulses: Distal pulses are intact.    Neurological: Patient is alert and oriented to person, place and time.  Normal strength.    Pupils:  Pupils are equal, round, and reactive to light.    Skin:  Warm and dry.      Medications:  Medication list was reviewed in EPIC and changes noted under Assessment/Plan and MAR.    Laboratory:  Recent Labs     01/03/20  0736   WBC 6.55   RBC 2.85*   HGB 7.0*   HCT 24.3*      MCV 85   MCH 24.6*   MCHC 28.8*   GRAN 59.3  3.9 "   LYMPH 9.5*  0.6*   MONO 20.2*  1.3*   EOS 0.7*      Recent Labs     01/02/20  0821   *      K 3.8   CL 95   CO2 32*   BUN 80*   CREATININE 2.5*   CALCIUM 9.9   ANIONGAP 13   MG 2.2   PHOS 3.5     Prior records reviewed.    ECHO reviewed:  · Severely decreased left ventricular systolic function. The estimated ejection fraction is 25%  · Concentric left ventricular hypertrophy.  · Global hypokinetic wall motion.  · Moderate right ventricular enlargement.  · Moderately reduced right ventricular systolic function.  · Severe biatrial enlargement.  · Moderate mitral regurgitation.  · Moderate tricuspid regurgitation.  · The estimated PA systolic pressure is 40 mm Hg  · Elevated central venous pressure (15 mm Hg).  · Atrial fibrillation observed.      Imaging reviewed  Imaging Results          CT Chest Without Contrast (No Result on File)                X-Ray Chest AP Portable (Final result)  Result time 12/24/19 19:09:07    Final result by Zeb Hirsch MD (12/24/19 19:09:07)                 Impression:      Masslike opacity overlying the right lower lung zone, similar to prior exam.  Right pleural fluid not excluded.  Chest CT recommended for further evaluation of right lung masslike opacity.    Patchy bilateral pulmonary opacities, similar to prior exam.    Cardiomegaly.    Electronically signed by resident: Cristofer Reddy  Date:    12/24/2019  Time:    18:48    Electronically signed by: Zeb Hirsch MD  Date:    12/24/2019  Time:    19:09             Narrative:    EXAMINATION:  XR CHEST AP PORTABLE    CLINICAL HISTORY:  CHF;    TECHNIQUE:  Single frontal view of the chest was performed.    COMPARISON:  Chest radiograph 12/03/2019    FINDINGS:  Cardiac leads overlie the chest wall.    Redemonstration of extensive bilateral patchy opacities, more prominent on the right with a focal area of masslike opacity in the right lower lung zone.  No significant change from prior exam.  Left costophrenic angle is  visible and right sided pleural fluid not excluded noting presence of opacification.  No pneumothorax.    The cardiac silhouette is enlarged.  Hilar and mediastinal contours are unchanged.    Bones are intact.                                  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 [I13.0]  Yes    Elevated alkaline phosphatase level [R74.8]  Yes    Coronary artery disease [I25.10]  Yes     Chronic    Stage 3 chronic kidney disease [N18.3]  Yes    Subtherapeutic international normalized ratio (INR) [R79.1]  Yes    Personal history of cerebral embolism [Z86.79]  Not Applicable    Personal history of venous thrombosis and embolism [Z86.718]  Not Applicable    Personal history of MI (myocardial infarction) [I25.2]  Not Applicable    Essential hypertension [I10]  Yes     Chronic    Atrial fibrillation, chronic [I48.20]  Yes     Chronic    Chronic anticoagulation [Z79.01]  Not Applicable     Chronic    Cardiomyopathy [I42.9]  Yes     Chronic      Resolved Hospital Problems   No resolved problems to display.      Acute on Chronic Combined Systolic and Diastolic Heart Failure  Cardiomyopathy  · CHF Pathway initiated  · Last 2D echo Nov 2019 with EF 25%  ·  and CXR with pulmonary edema and cardiomegaly  · Started on Lasix gtt.   · Received several doses of IV Diuril for augmentation.  · Now responding well after Lasix gtt increased to 30 mg/hr.   · Cont  gtt 2.5.   · Will change to lasix  mg BID  · Patient agreeable to palliative care discussion  · Will plan for PICC line for home , likely 1/4/2020  · Goal diuresis 2-3L/day.  Home diuretic dose:  Lasix 80mg PO BID  · BID BMP (with morning labs and at 4pm)  · Strict I&Os with daily weights.  · Hold BB, okay to c/w BiDil     JEAN-PIERRE on CKD3  · Baseline 1.8  · Cardiorenal,  improving w/ diuresis and     Chronic AFib  Long Term Use of Anticoagulants  Subtherapeutic INR  · Chronic issue  · HR in 100-120's  · Continue Warfarin at increased dose  · Pharmacy consulted for dosing management     Essential HTN  · Chronic and stable  · Continue Bidil BID     CAD  History of MI  · Chronic and stable  · Continue Aspirin 81mg PO daily  · Continue Bidil BID     Anemia  · Hgb > 7  · Received 5 units PRBCs on prior hospital admission  · Continue PO iron  · Type and screen obtained  · Monitor for bleeding/need for transfusion     History of VTE  History of Stroke  · Chronic and stable  · Continue Aspirin 81mg PO daily  · Continue Warfarin as above      Elevated Alk Phos  · Chronic and stable  · Monitor     Benign Hypertensive Heart and Kidney Disease with HF and CKD  · As above     Abnormal CT  · Needs CT chest when able to lie flat or outpt to evaluate masslike opacity seen on CXR     Diet:  Low Sodium, 1.2L fluid restriction  VTE PPx:  Warfarin  Goals of Care:  Full     High Risk Conditions:   Patient is currently on drug therapy requiring intensive monitoring for toxicity: Lasix IVP    Anticipated discharge date and disposition:   Pending diuresis.    Markus Howard MD  [facility]

## 2020-01-03 NOTE — SUBJECTIVE & OBJECTIVE
Interval History: Received several doses of IV Diuril but diuresis was limited. Now responding well after Lasix gtt increased to 30 mg/hr. Remains on  gtt at 2.5 mcg/kg.     Past Medical History:   Diagnosis Date    Anticoagulant long-term use     Cardiomegaly     Chronic combined systolic and diastolic congestive heart failure     Coronary artery disease     Heart attack 2006    Hypertension     Hyperthyroidism, subclinical 1/2/2013    MI (myocardial infarction) 9/22/2013    MI in 2009      Paroxysmal atrial fibrillation     PE (pulmonary embolism) 1/1/2013    IN 2010     S/P ablation of atrial flutter 2008    Stroke 2009    no residual weaknesses       Past Surgical History:   Procedure Laterality Date    COLONOSCOPY N/A 12/2/2019    Procedure: COLONOSCOPY;  Surgeon: Scott Rosario MD;  Location: [facility] ENDO (2ND FLR);  Service: Endoscopy;  Laterality: N/A;    ESOPHAGOGASTRODUODENOSCOPY N/A 12/2/2019    Procedure: EGD (ESOPHAGOGASTRODUODENOSCOPY);  Surgeon: Scott Rosario MD;  Location: [facility] ENDO ([facility] FLR);  Service: Endoscopy;  Laterality: N/A;    RADIOFREQUENCY ABLATION  01/08/2008    for atrial flutter       Review of patient's allergies indicates:   Allergen Reactions    Acetaminophen      Itching    Oxycodone-acetaminophen      Other reaction(s): Itching    Ace inhibitors Other (See Comments)     cough       Medications:  Continuous Infusions:   DOBUTamine 2.5 mcg/kg/min (01/02/20 0012)    furosemide (LASIX) 2 mg/mL infusion (non-titrating) 30 mg/hr (01/03/20 0704)     Scheduled Meds:   aspirin  81 mg Oral Daily    ferrous sulfate  325 mg Oral Daily    isosorbide-hydrALAZINE 20-37.5 mg  1 tablet Oral BID    lidocaine  1 patch Transdermal Q24H    methyl salicylate-menthol 15-10%   Topical (Top) TID    warfarin  7.5 mg Oral Daily     PRN Meds:acetaminophen, Dextrose 10% Bolus, Dextrose 10% Bolus, glucagon (human recombinant), glucose, glucose, melatonin, methocarbamol, morphine,  ondansetron, promethazine (PHENERGAN) IVPB, senna-docusate 8.6-50 mg, sodium chloride, sodium chloride 0.9%, sodium chloride 0.9%    Family History     Problem Relation (Age of Onset)    Alcohol abuse Father    Hypertension Mother, Father, Sister, Brother    Stroke Mother        Tobacco Use    Smoking status: Never Smoker    Smokeless tobacco: Never Used   Substance and Sexual Activity    Alcohol use: No    Drug use: No    Sexual activity: Never       Review of Systems   Constitutional: Negative for appetite change and fatigue.   HENT: Negative for sore throat and trouble swallowing.    Respiratory: Positive for shortness of breath. Negative for cough and wheezing.    Cardiovascular: Positive for leg swelling. Negative for chest pain.   Gastrointestinal: Positive for constipation. Negative for diarrhea, nausea and vomiting.   Genitourinary: Negative for dysuria and hematuria.   Musculoskeletal: Positive for arthralgias (hips). Negative for back pain and neck pain.   Skin: Negative for rash and wound.   Neurological: Negative for dizziness and light-headedness.   Psychiatric/Behavioral: Negative for confusion and dysphoric mood. The patient is not nervous/anxious.      Objective:     Vital Signs (Most Recent):  Temp: 98 °F (36.7 °C) (01/03/20 0824)  Pulse: 105 (01/03/20 0824)  Resp: 16 (01/03/20 0824)  BP: 126/64 (01/03/20 0824)  SpO2: (!) 94 % (01/03/20 0824) Vital Signs (24h Range):  Temp:  [97.8 °F (36.6 °C)-99.2 °F (37.3 °C)] 98 °F (36.7 °C)  Pulse:  [] 105  Resp:  [16-20] 16  SpO2:  [93 %-98 %] 94 %  BP: (111-143)/(56-74) 126/64     Weight: 125.1 kg (275 lb 12.7 oz)  Body mass index is 40.73 kg/m².    Review of Symptoms  Symptom Assessment (ESAS 0-10 scale)   ESAS 0 1 2 3 4 5 6 7 8 9 10   Pain           X   Dyspnea  X            Anxiety X             Nausea X             Depression  X             Anorexia X             Fatigue X             Insomnia X             Restlessness  X             Agitation  X             CAM / Delirium _X_ --  ___+   Constipation     __ --  _X_+   Diarrhea           _X_ --  ___+  Bowel Management Plan (BMP): Yes    Comments: Senna-Docusate ordered BID PRN    Pain Assessment: Location: bilateral hips  Quality: shooting  Quantity: 10/10 in intensity  Aggravating factors: worsens with movement or activity  Alleviating factors: improves with Morphine; dose due now      OME in 24 hours: 30    Performance Status: 50    Physical Exam   Constitutional: He is oriented to person, place, and time. He appears well-developed and well-nourished.   HENT:   Head: Normocephalic and atraumatic.   Eyes: Conjunctivae and EOM are normal.   Neck: Normal range of motion. Neck supple.   Cardiovascular: Regular rhythm. Tachycardia present.   Pulmonary/Chest: Effort normal. No respiratory distress.   Abdominal: Soft. There is no tenderness.   Musculoskeletal: Normal range of motion. He exhibits edema.   Neurological: He is alert and oriented to person, place, and time.   Skin: Skin is warm and dry.   Psychiatric: He has a normal mood and affect. His behavior is normal. Thought content normal.   Vitals reviewed.      Significant Labs: All pertinent labs within the past 24 hours have been reviewed.  CBC:   Recent Labs   Lab 01/03/20  0736   WBC 6.55   HGB 7.0*   HCT 24.3*   MCV 85        BMP:  Recent Labs   Lab 01/03/20  0736         K 3.8   CL 95   CO2 35*   BUN 85*   CREATININE 2.4*   CALCIUM 9.8   MG 2.2     LFT:  Lab Results   Component Value Date    AST 25 01/03/2020     (H) 04/21/2019    ALKPHOS 253 (H) 01/03/2020    BILITOT 1.4 (H) 01/03/2020     Albumin:   Albumin   Date Value Ref Range Status   01/03/2020 3.3 (L) 3.5 - 5.2 g/dL Final     Protein:   Total Protein   Date Value Ref Range Status   01/03/2020 8.7 (H) 6.0 - 8.4 g/dL Final     Lactic acid:   Lab Results   Component Value Date    LACTATE 1.0 11/27/2019    LACTATE 1.0 11/26/2019       Significant Imaging: I have  reviewed all pertinent imaging results/findings within the past 24 hours.    Advance Care Planning   Advanced Directives::  Living Will: No  LaPOST: No  Do Not Resuscitate Status: No  Medical Power of : No. Patient identifies brother Kris [place] ([phone]) as his surrogate decision maker.    Decision-Making Capacity: Patient answered questions       Living Arrangements: Lives alone    Psychosocial/Cultural:  Patient is unmarried and has no children. His parents are . He has 5 siblings and a lot of cousins and friends. He lives alone in [place] but says his brother Kris lives right next to him. 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 He says that his goal is to get healthy. He worries about dying.    Spiritual:     F- Melina and Belief: does not identify with a particular Yarsanism but prays to God    I - Importance: somewhat important  .  C - Community: does not belong to a Baptism    A - Address in Care:  visits

## 2020-01-03 NOTE — PLAN OF CARE
Complaints of pain treated with PRN medications.  and lasix gtt maintained. Palliative care consulted. Plan to DC on home  but needs PICC placement first. Few short runs of vtach; pt asymptomatic, non sustaining. Edema and blisters to BLEs. VSS. Pt denies SOB or chest pain. Pt remain on telemetry; afib on telemetry. Fall precautions maintained. Pt free of falls and injuries. POC discussed with patient/family, verbalized understanding. Questions answered, no distress at present.

## 2020-01-03 NOTE — PLAN OF CARE
Recommendations    Recommendation/Intervention:   1. Continue current diet order as tolerated with oral supplement as needed.   2. Encouraged good PO intake of meals. Will monitor.   Goals: Pt to meet % EEN and EPN by RD follow-up.

## 2020-01-03 NOTE — PT/OT/SLP PROGRESS
Occupational Therapy      Patient Name:  Hamlet Terrell   MRN:  [id number]    Patient not seen today secondary to pt refused 2* 10/10 pain. Will follow-up next OT visit.    DHRUV Hogan  1/3/2020

## 2020-01-03 NOTE — PT/OT/SLP PROGRESS
Physical Therapy      Patient Name:  Hamlet Terrell   MRN:  [id number]    Patient not seen today secondary to Pain(Pt declining therapy this date 2* continued hip pain.). Pt educated on importance of having staff assist for standing tasks/transfers 2* hip pain and increased fall risk. Pt v/u. Pt educated on PT POC, including plan for follow-up next week. Pt v/u. Will follow-up at next scheduled session as able.    Eboni Pittman, PT, DPT   1/3/2020  [phone]

## 2020-01-03 NOTE — CARE UPDATE
Rapid Response Nurse Chart Check     Chart check completed, abnormal VS noted. Bedside RN Elisabeth contacted, no concerns verbalized at this time. RN instructed to call [phone] for further concerns or assistance.

## 2020-01-03 NOTE — PROGRESS NOTES
Pt refusing AM vitals and daily weight orders. Requesting nasal spray for a dry nose. MD Nolasco notified.

## 2020-01-04 LAB
ABO + RH BLD: NORMAL
ALBUMIN SERPL BCP-MCNC: 3.2 G/DL (ref 3.5–5.2)
ALP SERPL-CCNC: 247 U/L (ref 55–135)
ALT SERPL W/O P-5'-P-CCNC: 15 U/L (ref 10–44)
ANION GAP SERPL CALC-SCNC: 12 MMOL/L (ref 8–16)
AST SERPL-CCNC: 28 U/L (ref 10–40)
BASOPHILS # BLD AUTO: 0.03 K/UL (ref 0–0.2)
BASOPHILS # BLD AUTO: 0.03 K/UL (ref 0–0.2)
BASOPHILS NFR BLD: 0.4 % (ref 0–1.9)
BASOPHILS NFR BLD: 0.4 % (ref 0–1.9)
BILIRUB SERPL-MCNC: 1.3 MG/DL (ref 0.1–1)
BLD GP AB SCN CELLS X3 SERPL QL: NORMAL
BLD PROD TYP BPU: NORMAL
BLOOD UNIT EXPIRATION DATE: NORMAL
BLOOD UNIT TYPE CODE: 2800
BLOOD UNIT TYPE: NORMAL
BUN SERPL-MCNC: 86 MG/DL (ref 6–20)
CALCIUM SERPL-MCNC: 9.8 MG/DL (ref 8.7–10.5)
CHLORIDE SERPL-SCNC: 96 MMOL/L (ref 95–110)
CO2 SERPL-SCNC: 33 MMOL/L (ref 23–29)
CODING SYSTEM: NORMAL
CREAT SERPL-MCNC: 2.7 MG/DL (ref 0.5–1.4)
DIFFERENTIAL METHOD: ABNORMAL
DIFFERENTIAL METHOD: ABNORMAL
DISPENSE STATUS: NORMAL
EOSINOPHIL # BLD AUTO: 0.7 K/UL (ref 0–0.5)
EOSINOPHIL # BLD AUTO: 0.8 K/UL (ref 0–0.5)
EOSINOPHIL NFR BLD: 9.4 % (ref 0–8)
EOSINOPHIL NFR BLD: 9.5 % (ref 0–8)
ERYTHROCYTE [DISTWIDTH] IN BLOOD BY AUTOMATED COUNT: 23 % (ref 11.5–14.5)
ERYTHROCYTE [DISTWIDTH] IN BLOOD BY AUTOMATED COUNT: 23 % (ref 11.5–14.5)
EST. GFR  (AFRICAN AMERICAN): 29.8 ML/MIN/1.73 M^2
EST. GFR  (NON AFRICAN AMERICAN): 25.7 ML/MIN/1.73 M^2
GLUCOSE SERPL-MCNC: 101 MG/DL (ref 70–110)
HCT VFR BLD AUTO: 23.5 % (ref 40–54)
HCT VFR BLD AUTO: 24.2 % (ref 40–54)
HGB BLD-MCNC: 6.6 G/DL (ref 14–18)
HGB BLD-MCNC: 6.8 G/DL (ref 14–18)
IMM GRANULOCYTES # BLD AUTO: 0.03 K/UL (ref 0–0.04)
IMM GRANULOCYTES # BLD AUTO: 0.03 K/UL (ref 0–0.04)
IMM GRANULOCYTES NFR BLD AUTO: 0.4 % (ref 0–0.5)
IMM GRANULOCYTES NFR BLD AUTO: 0.4 % (ref 0–0.5)
INR PPP: 2.8 (ref 0.8–1.2)
LYMPHOCYTES # BLD AUTO: 0.6 K/UL (ref 1–4.8)
LYMPHOCYTES # BLD AUTO: 0.7 K/UL (ref 1–4.8)
LYMPHOCYTES NFR BLD: 7.7 % (ref 18–48)
LYMPHOCYTES NFR BLD: 9.2 % (ref 18–48)
MAGNESIUM SERPL-MCNC: 2.3 MG/DL (ref 1.6–2.6)
MCH RBC QN AUTO: 24.1 PG (ref 27–31)
MCH RBC QN AUTO: 24.3 PG (ref 27–31)
MCHC RBC AUTO-ENTMCNC: 28.1 G/DL (ref 32–36)
MCHC RBC AUTO-ENTMCNC: 28.1 G/DL (ref 32–36)
MCV RBC AUTO: 86 FL (ref 82–98)
MCV RBC AUTO: 86 FL (ref 82–98)
MONOCYTES # BLD AUTO: 1.3 K/UL (ref 0.3–1)
MONOCYTES # BLD AUTO: 1.6 K/UL (ref 0.3–1)
MONOCYTES NFR BLD: 15.9 % (ref 4–15)
MONOCYTES NFR BLD: 20.7 % (ref 4–15)
NEUTROPHILS # BLD AUTO: 4.7 K/UL (ref 1.8–7.7)
NEUTROPHILS # BLD AUTO: 5.5 K/UL (ref 1.8–7.7)
NEUTROPHILS NFR BLD: 59.8 % (ref 38–73)
NEUTROPHILS NFR BLD: 66.2 % (ref 38–73)
NRBC BLD-RTO: 0 /100 WBC
NRBC BLD-RTO: 0 /100 WBC
PHOSPHATE SERPL-MCNC: 3.5 MG/DL (ref 2.7–4.5)
PLATELET # BLD AUTO: 221 K/UL (ref 150–350)
PLATELET # BLD AUTO: 230 K/UL (ref 150–350)
PMV BLD AUTO: 10 FL (ref 9.2–12.9)
PMV BLD AUTO: 10.3 FL (ref 9.2–12.9)
POTASSIUM SERPL-SCNC: 4.6 MMOL/L (ref 3.5–5.1)
PROT SERPL-MCNC: 8.3 G/DL (ref 6–8.4)
PROTHROMBIN TIME: 26.3 SEC (ref 9–12.5)
RBC # BLD AUTO: 2.72 M/UL (ref 4.6–6.2)
RBC # BLD AUTO: 2.82 M/UL (ref 4.6–6.2)
SODIUM SERPL-SCNC: 141 MMOL/L (ref 136–145)
TRANS ERYTHROCYTES VOL PATIENT: NORMAL ML
WBC # BLD AUTO: 7.81 K/UL (ref 3.9–12.7)
WBC # BLD AUTO: 8.22 K/UL (ref 3.9–12.7)

## 2020-01-04 PROCEDURE — 63600175 PHARM REV CODE 636 W HCPCS: Performed by: HOSPITALIST

## 2020-01-04 PROCEDURE — 84100 ASSAY OF PHOSPHORUS: CPT

## 2020-01-04 PROCEDURE — P9021 RED BLOOD CELLS UNIT: HCPCS

## 2020-01-04 PROCEDURE — 99233 PR SUBSEQUENT HOSPITAL CARE,LEVL III: ICD-10-PCS | Mod: ,,, | Performed by: HOSPITALIST

## 2020-01-04 PROCEDURE — 83735 ASSAY OF MAGNESIUM: CPT

## 2020-01-04 PROCEDURE — 85025 COMPLETE CBC W/AUTO DIFF WBC: CPT

## 2020-01-04 PROCEDURE — 85610 PROTHROMBIN TIME: CPT

## 2020-01-04 PROCEDURE — 20600001 HC STEP DOWN PRIVATE ROOM

## 2020-01-04 PROCEDURE — 25000003 PHARM REV CODE 250: Performed by: HOSPITALIST

## 2020-01-04 PROCEDURE — 99233 SBSQ HOSP IP/OBS HIGH 50: CPT | Mod: ,,, | Performed by: HOSPITALIST

## 2020-01-04 PROCEDURE — 86900 BLOOD TYPING SEROLOGIC ABO: CPT

## 2020-01-04 PROCEDURE — 86920 COMPATIBILITY TEST SPIN: CPT

## 2020-01-04 PROCEDURE — 80053 COMPREHEN METABOLIC PANEL: CPT

## 2020-01-04 PROCEDURE — 36415 COLL VENOUS BLD VENIPUNCTURE: CPT

## 2020-01-04 RX ORDER — FUROSEMIDE 10 MG/ML
120 INJECTION INTRAMUSCULAR; INTRAVENOUS 3 TIMES DAILY
Status: DISCONTINUED | OUTPATIENT
Start: 2020-01-04 | End: 2020-01-05

## 2020-01-04 RX ORDER — HYDROCODONE BITARTRATE AND ACETAMINOPHEN 500; 5 MG/1; MG/1
TABLET ORAL
Status: DISCONTINUED | OUTPATIENT
Start: 2020-01-04 | End: 2020-01-13

## 2020-01-04 RX ADMIN — MENTHOL, METHYL SALICYLATE: 10; 15 CREAM TOPICAL at 08:01

## 2020-01-04 RX ADMIN — MORPHINE SULFATE 4 MG: 2 INJECTION, SOLUTION INTRAMUSCULAR; INTRAVENOUS at 12:01

## 2020-01-04 RX ADMIN — HYDRALAZINE HYDROCHLORIDE AND ISOSORBIDE DINITRATE 1 TABLET: 37.5; 2 TABLET, FILM COATED ORAL at 08:01

## 2020-01-04 RX ADMIN — MORPHINE SULFATE 4 MG: 2 INJECTION, SOLUTION INTRAMUSCULAR; INTRAVENOUS at 04:01

## 2020-01-04 RX ADMIN — CHLOROTHIAZIDE SODIUM 500 MG: 500 INJECTION, POWDER, LYOPHILIZED, FOR SOLUTION INTRAVENOUS at 05:01

## 2020-01-04 RX ADMIN — FUROSEMIDE 120 MG: 10 INJECTION, SOLUTION INTRAMUSCULAR; INTRAVENOUS at 05:01

## 2020-01-04 RX ADMIN — ASPIRIN 81 MG: 81 TABLET, COATED ORAL at 08:01

## 2020-01-04 RX ADMIN — FUROSEMIDE 120 MG: 10 INJECTION, SOLUTION INTRAMUSCULAR; INTRAVENOUS at 08:01

## 2020-01-04 RX ADMIN — MORPHINE SULFATE 4 MG: 2 INJECTION, SOLUTION INTRAMUSCULAR; INTRAVENOUS at 11:01

## 2020-01-04 RX ADMIN — WARFARIN SODIUM 7.5 MG: 7.5 TABLET ORAL at 04:01

## 2020-01-04 RX ADMIN — ONDANSETRON 8 MG: 2 INJECTION INTRAMUSCULAR; INTRAVENOUS at 08:01

## 2020-01-04 RX ADMIN — FERROUS SULFATE TAB EC 325 MG (65 MG FE EQUIVALENT) 325 MG: 325 (65 FE) TABLET DELAYED RESPONSE at 08:01

## 2020-01-04 RX ADMIN — DOBUTAMINE IN DEXTROSE 2.5 MCG/KG/MIN: 200 INJECTION, SOLUTION INTRAVENOUS at 04:01

## 2020-01-04 NOTE — PLAN OF CARE
Plan of care discussed with patient. Patient is free of fall or injury. Denies CP, SOB, but c/o 10/10 pain in hands. Pain treated with PRN morphine, and patient refuses lidocaine patch. 1 unit PRBC administered after HGB/6.8 and nosebleed.  drip continued. Patient continuing to refuse IV rotation. All questions addressed; will continue to monitor.

## 2020-01-04 NOTE — PLAN OF CARE
Complaints of pain treated with PRN medications.  gtt maintained. Palliative care consulted. Plan to DC on home  but needs PICC placement first. 18 run of vtach along with a few short runs; pt asymptomatic, non sustaining. Edema and blisters to BLEs. VSS. Pt denies SOB or chest pain. Pt remain on telemetry; afib on telemetry. Fall precautions maintained. Pt free of falls and injuries. POC discussed with patient/family, verbalized understanding. Questions answered, no distress at present.

## 2020-01-04 NOTE — PROGRESS NOTES
Progress Note  Hospital Medicine    Provider team: [facility] [place] [facility]  Admit Date: 12/24/2019  Encounter Date: 01/04/2020     SUBJECTIVE:     Follow-up Visit for: Acute on chronic combined systolic and diastolic congestive heart failure    HPI (See H&P for complete P,F,SHx):  54M with chronic combined systolic and diastolic heart failure (EF 25%), afib on Coumadin, CAD, CVA, and history of PE who presents to the ED for evaluation of shortness of breath and edema.  He was just admitted to Hospital Medicine from 11/24-12/8 for a CHF exacerbation.  During that admission, he was evaluated by Cardiology who started him on Dobutamine and Diuril.  He was resistant to HTS evaluation during that stay, which included recommendations for LVAD, transplant, and ICD.  He was discharged home on Lasix 80mg PO BID.  He endorses compliance with his diuretic regimen, but despite this and following a low salt diet, he has continued to gain weight, have lower extremity and abdominal swelling, as well as shortness of breath.  He is so short of breath that he has to sleep in a chair, and has been unable to sleep because of his breathing.  He denies any chest pain.  He does have a cough that is mostly dry, but occasionally productive of blood tinged sputum.  He was discharged weight a weight of 259lbs, and claims that is around his dry weight.  He is currently 286lbs.       Upon arrival to the ED, vitals were /83, HR 88, RR 22.  Labs showed Hemoglobin 7.3, INR 1.5, Creatinine 1.8, Alk Phos 278,  with Trop 0.128. CXR showed cardiomegaly and pulmonary edema, along with a masslike opacity. CT chest was attempted to be ordered to evaluate the mass, but he was unable to lie flat to get it evaluated.  He was given Lasix 80mg IV and was admitted to Hospital Medicine for further management.    TTE 11/27/2019  · Severely decreased left ventricular systolic function.   · The estimated ejection fraction is 25%  · Concentric left ventricular  hypertrophy.  · Global hypokinetic wall motion.  · Moderate right ventricular enlargement.  · Moderately reduced right ventricular systolic function.  · Severe biatrial enlargement.  · Moderate mitral regurgitation.  · Moderate tricuspid regurgitation.  · The estimated PA systolic pressure is 40 mm Hg  · Elevated central venous pressure (15 mm Hg).  · Atrial fibrillation observed.    Interval history:  Patient's weight stagnant and UOP has tapered. Will add diuril to lasix 120 mg BID. Increase lasix to TID dosing.  Patient with severe epistaxis this AM. Patient reports that this was spontaneous in nature. INR is therapeutic. Will transfuse 1U pRBC for HgB <7.  Patient c/o pain in hands in addition to hip. He could not sign the consent form but agreed. He only put an X on the consent. I have documented on form that he agreed but couldn't sign due to pain.    Wt Readings from Last 30 Encounters:   01/04/20 126.3 kg (278 lb 7.1 oz)   12/07/19 117.6 kg (259 lb 4.2 oz)   10/23/19 108.9 kg (240 lb)   09/09/19 108.9 kg (240 lb)   08/04/19 105.2 kg (232 lb)   07/18/19 108.2 kg (238 lb 8.6 oz)   06/20/19 118.9 kg (262 lb 2 oz)   05/21/19 121.1 kg (267 lb)   05/16/19 116.3 kg (256 lb 6.3 oz)   04/20/19 127.5 kg (281 lb 1.4 oz)   01/31/19 113.4 kg (250 lb)   11/04/18 123.1 kg (271 lb 6.2 oz)   08/31/18 119.6 kg (263 lb 10.7 oz)   07/28/18 113.4 kg (250 lb)   07/10/18 120.7 kg (266 lb)   05/03/18 117.9 kg (259 lb 14.8 oz)   03/30/18 117.9 kg (260 lb)   02/21/18 117.9 kg (260 lb)   01/12/18 113.4 kg (250 lb)   12/10/17 117.9 kg (260 lb)   11/11/17 113.9 kg (251 lb)   11/01/17 111.8 kg (246 lb 7.6 oz)   10/26/17 118.5 kg (261 lb 3.2 oz)   08/13/17 117.9 kg (260 lb)   08/12/17 117.9 kg (260 lb)   05/10/17 117.9 kg (260 lb)   04/05/17 108.4 kg (239 lb 1.4 oz)   02/01/17 108.9 kg (240 lb)   12/15/16 119.4 kg (263 lb 3.7 oz)   08/26/16 112.5 kg (248 lb)     Review of Systems:  Review of Systems   Constitutional: Negative for chills  and fever.   HENT: Negative for congestion and sore throat.    Eyes: Negative for photophobia, pain and discharge.   Respiratory: Positive for shortness of breath. Negative for cough, hemoptysis and sputum production.    Cardiovascular: Positive for orthopnea, leg swelling and PND. Negative for chest pain and palpitations.   Gastrointestinal: Negative for abdominal pain, diarrhea, nausea and vomiting.   Genitourinary: Negative for dysuria and urgency.   Musculoskeletal: Negative for myalgias and neck pain.   Skin: Negative for itching and rash.   Neurological: Negative for sensory change, focal weakness and headaches.   Endo/Heme/Allergies: Negative for polydipsia. Does not bruise/bleed easily.   Psychiatric/Behavioral: Negative for depression and suicidal ideas.     Past Medical History:   Diagnosis Date    Anticoagulant long-term use     Cardiomegaly     Chronic combined systolic and diastolic congestive heart failure     Coronary artery disease     Heart attack 2006    Hypertension     Hyperthyroidism, subclinical 1/2/2013    MI (myocardial infarction) 9/22/2013    MI in 2009      Paroxysmal atrial fibrillation     PE (pulmonary embolism) 1/1/2013    IN 2010     S/P ablation of atrial flutter 2008    Stroke 2009    no residual weaknesses     Social History     Socioeconomic History    Marital status: Single     Spouse name: Not on file    Number of children: Not on file    Years of education: Not on file    Highest education level: Not on file   Occupational History    Not on file   Social Needs    Financial resource strain: Not on file    Food insecurity:     Worry: Not on file     Inability: Not on file    Transportation needs:     Medical: Not on file     Non-medical: Not on file   Tobacco Use    Smoking status: Never Smoker    Smokeless tobacco: Never Used   Substance and Sexual Activity    Alcohol use: No    Drug use: No    Sexual activity: Never   Lifestyle    Physical activity:     " Days per week: Not on file     Minutes per session: Not on file    Stress: Not on file   Relationships    Social connections:     Talks on phone: Not on file     Gets together: Not on file     Attends Holiness service: Not on file     Active member of club or organization: Not on file     Attends meetings of clubs or organizations: Not on file     Relationship status: Not on file   Other Topics Concern    Not on file   Social History Narrative    Not on file       OBJECTIVE:       Intake/Output Summary (Last 24 hours) at 1/4/2020 1657  Last data filed at 1/4/2020 1500  Gross per 24 hour   Intake 1200 ml   Output 850 ml   Net 350 ml     Vital Signs Range (Last 24H):  Temp:  [98 °F (36.7 °C)-98.5 °F (36.9 °C)]   Pulse:  []   Resp:  [14-20]   BP: (114-167)/(57-75)   SpO2:  [88 %-94 %]   Body mass index is 41.12 kg/m².    Objective:  General Appearance:  Comfortable, well-appearing, in no acute distress and not in pain.    Vital signs: (most recent): Blood pressure (!) 141/65, pulse (!) 113, temperature 98.3 °F (36.8 °C), temperature source Oral, resp. rate 14, height 5' 9" (1.753 m), weight 126.3 kg (278 lb 7.1 oz), SpO2 (!) 94 %.  No fever.    Output: Producing urine and producing stool.    HEENT: Normal HEENT exam.  (+JVD)    Lungs:  Normal effort.  Breath sounds clear to auscultation.  He is not in respiratory distress.  There are rales.  No wheezes.    Heart: Normal rate.  Regular rhythm.  S1 normal and S2 normal.  Positive for murmur.    Chest: Symmetric chest wall expansion.   Abdomen: Abdomen is soft.  Bowel sounds are normal.   There is no abdominal tenderness.     Extremities: Normal range of motion.  There is venous stasis and dependent edema.  There is no deformity, effusion or local swelling.    Pulses: Distal pulses are intact.    Neurological: Patient is alert and oriented to person, place and time.  Normal strength.    Pupils:  Pupils are equal, round, and reactive to light.    Skin:  Warm " and dry.      Medications:  Medication list was reviewed in EPIC and changes noted under Assessment/Plan and MAR.    Laboratory:  Recent Labs     01/04/20  1047   WBC 8.22   RBC 2.72*   HGB 6.6*   HCT 23.5*      MCV 86   MCH 24.3*   MCHC 28.1*   GRAN 66.2  5.5   LYMPH 7.7*  0.6*   MONO 15.9*  1.3*   EOS 0.8*      Recent Labs     01/04/20  0425         K 4.6   CL 96   CO2 33*   BUN 86*   CREATININE 2.7*   CALCIUM 9.8   ANIONGAP 12   MG 2.3   PHOS 3.5     Prior records reviewed.    ECHO reviewed:  · Severely decreased left ventricular systolic function. The estimated ejection fraction is 25%  · Concentric left ventricular hypertrophy.  · Global hypokinetic wall motion.  · Moderate right ventricular enlargement.  · Moderately reduced right ventricular systolic function.  · Severe biatrial enlargement.  · Moderate mitral regurgitation.  · Moderate tricuspid regurgitation.  · The estimated PA systolic pressure is 40 mm Hg  · Elevated central venous pressure (15 mm Hg).  · Atrial fibrillation observed.      Imaging reviewed  Imaging Results          CT Chest Without Contrast (No Result on File)                X-Ray Chest AP Portable (Final result)  Result time 12/24/19 19:09:07    Final result by Zeb Hirsch MD (12/24/19 19:09:07)                 Impression:      Masslike opacity overlying the right lower lung zone, similar to prior exam.  Right pleural fluid not excluded.  Chest CT recommended for further evaluation of right lung masslike opacity.    Patchy bilateral pulmonary opacities, similar to prior exam.    Cardiomegaly.    Electronically signed by resident: Cristofer Reddy  Date:    12/24/2019  Time:    18:48    Electronically signed by: Zeb Hirsch MD  Date:    12/24/2019  Time:    19:09             Narrative:    EXAMINATION:  XR CHEST AP PORTABLE    CLINICAL HISTORY:  CHF;    TECHNIQUE:  Single frontal view of the chest was performed.    COMPARISON:  Chest radiograph  12/03/2019    FINDINGS:  Cardiac leads overlie the chest wall.    Redemonstration of extensive bilateral patchy opacities, more prominent on the right with a focal area of masslike opacity in the right lower lung zone.  No significant change from prior exam.  Left costophrenic angle is visible and right sided pleural fluid not excluded noting presence of opacification.  No pneumothorax.    The cardiac silhouette is enlarged.  Hilar and mediastinal contours are unchanged.    Bones are intact.                                  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 [I13.0]  Yes    Elevated alkaline phosphatase level [R74.8]  Yes    Coronary artery disease [I25.10]  Yes     Chronic    Stage 3 chronic kidney disease [N18.3]  Yes    Subtherapeutic international normalized ratio (INR) [R79.1]  Yes    Personal history of cerebral embolism [Z86.79]  Not Applicable    Personal history of venous thrombosis and embolism [Z86.718]  Not Applicable    Personal history of MI (myocardial infarction) [I25.2]  Not Applicable    Essential hypertension [I10]  Yes     Chronic    Atrial fibrillation, chronic [I48.20]  Yes     Chronic    Chronic anticoagulation [Z79.01]  Not Applicable     Chronic    Cardiomyopathy [I42.9]  Yes     Chronic      Resolved Hospital Problems   No resolved problems to display.      Acute on Chronic Combined Systolic and Diastolic Heart Failure  Cardiomyopathy  · CHF Pathway initiated  · Last 2D echo Nov 2019 with EF 25%  ·  and CXR with pulmonary edema and cardiomegaly  · Started on Lasix gtt.   · Received several doses of IV Diuril for augmentation.   · Cont  gtt 2.5.    · Will change to lasix  mg TID  · Add diuril  · Patient agreeable to palliative care discussion  · Will plan for PICC line for home   · Goal diuresis 2-3L/day.  Home diuretic dose:  Lasix 80mg PO BID  · BID BMP (with morning labs and at 4pm)  · Strict I&Os with daily weights.  · Hold BB, okay to c/w BiDil     JEAN-PIERRE on CKD3  · Baseline 1.8  · Cardiorenal, improving w/ diuresis and     Chronic AFib  Long Term Use of Anticoagulants  Subtherapeutic INR  · Chronic issue  · HR in 100-120's  · Continue Warfarin at increased dose  · Pharmacy consulted for dosing management     Essential HTN  · Chronic and stable  · Continue Bidil BID     CAD  History of MI  · Chronic and stable  · Continue Aspirin 81mg PO daily  · Continue Bidil BID     Anemia  · Hgb > 7  · Received 5 units PRBCs on prior hospital admission  · Continue PO iron  · Type and screen obtained  · Monitor for bleeding/need for transfusion  · Will transfuse 1U pRBC after episode of epistaxis noted     History of VTE  History of Stroke  · Chronic and stable  · Continue Aspirin 81mg PO daily  · Continue Warfarin as above      Elevated Alk Phos  · Chronic and stable  · Monitor     Benign Hypertensive Heart and Kidney Disease with HF and CKD  · As above     Abnormal CT  · Needs CT chest when able to lie flat or outpt to evaluate masslike opacity seen on CXR     Diet:  Low Sodium, 1.2L fluid restriction  VTE PPx:  Warfarin  Goals of Care:  Full     High Risk Conditions:   Patient is currently on drug therapy requiring intensive monitoring for toxicity: Lasix IVP    Anticipated discharge date and disposition:   Pending diuresis.    Markus Howard MD  [facility]

## 2020-01-04 NOTE — PROGRESS NOTES
Patient called RN to room c/o nosebleed. Patient given gauze and instructed to tilt head down and hold pressure. RN returned to room 5 minutes later. Patient stated the gauze didn't work and he needed to tilt his head back, causing him to spit up 1/4 cup of blood. MD notified.

## 2020-01-04 NOTE — ASSESSMENT & PLAN NOTE
"Palliative care consulted for goals of care discussion/advanced care planning for this 55 y/o male being followed by hospital medicine for Acute on Chronic Combined Systolic and Diastolic Heart Failure, Cardiomyopathy, JEAN-PIERRE on CKD3, Chronic AFib, Essential HTN, CAD, Anemia, Elevated Alk Phos, Abnormal CT, History of MI, History of VTE and History of Stroke. Rounded on patient today, no family members at bedside. Patient is alert, oriented, and cooperative with visit. He reports that dyspnea has improved on Lasix and  gtt. He complains of bilateral hip pain rated 10/10 that improves with Morphine, next dose is due now.    Plan/Recommendations:  1. See goals of care discussion below.  2. Patient is not ready to enroll in hospice but is interested in meeting with hospice rep to get more information on their services. No preference in agencies.  notified.  3. Palliative care will follow up with patient on Monday.    Goals of Care/Advance Care Planning:  Conference conducted by this APRN with patient to discuss his current clinical status, goals of care, treatment options, code status, long term expected outcomes and prognostic awareness. After a discussion concerning his values and wishes, patient verbalized limited understanding and insight related to his clinical condition and prognostic awareness. His stated goal is to "get healthy." He has been told that he will need to be on  gtt for the rest of his life but still hopes that he can live a long time. He has never thought about his end of life preferences and whether he would prefer to die at home or at the hospital. Discussed home hospice as an option that would allow him to receive care in the home and provide management of symptoms. Patient is not ready to enroll in hospice at this time but is interested in meeting with a hospice rep to get more information on their services.    Patient has never completed a living will or MPOA. His preference " for surrogate decision maker would be his brother Kris [place]. Explained to patient that palliative care can assist with completion of MPOA paperwork whenever he is ready. He is currently a full code and not ready to make any changes to his code status.

## 2020-01-04 NOTE — CONSULTS
"[facility]  Palliative Medicine  Consult Note    Patient Name: Hamlet Terrell  MRN: [id number]  Admission Date: 12/24/2019  Hospital Length of Stay: 11 days  Code Status: Full Code   Attending Provider: Markus Howard MD  Consulting Provider: Vira Davila NP  Primary Care Physician: Carlin Swift MD  Principal Problem:Acute on chronic combined systolic and diastolic congestive heart failure    Patient information was obtained from patient, past medical records and Dr. Howard.      Consults  Assessment/Plan:     Palliative care encounter  Palliative care consulted for goals of care discussion/advanced care planning for this 55 y/o male being followed by hospital medicine for Acute on Chronic Combined Systolic and Diastolic Heart Failure, Cardiomyopathy, JEAN-PIERRE on CKD3, Chronic AFib, Essential HTN, CAD, Anemia, Elevated Alk Phos, Abnormal CT, History of MI, History of VTE and History of Stroke. Rounded on patient today, no family members at bedside. Patient is alert, oriented, and cooperative with visit. He reports that dyspnea has improved on Lasix and  gtt. He complains of bilateral hip pain rated 10/10 that improves with Morphine, next dose is due now.    Plan/Recommendations:  1. See goals of care discussion below.  2. Patient is not ready to enroll in hospice but is interested in meeting with hospice rep to get more information on their services. No preference in agencies.  notified.  3. Palliative care will follow up with patient on Monday.    Goals of Care/Advance Care Planning:  Conference conducted by this APRN with patient to discuss his current clinical status, goals of care, treatment options, code status, long term expected outcomes and prognostic awareness. After a discussion concerning his values and wishes, patient verbalized limited understanding and insight related to his clinical condition and prognostic awareness. His stated goal is to "get healthy." He " has been told that he will need to be on  gtt for the rest of his life but still hopes that he can live a long time. He has never thought about his end of life preferences and whether he would prefer to die at home or at the hospital. Discussed home hospice as an option that would allow him to receive care in the home and provide management of symptoms. Patient is not ready to enroll in hospice at this time but is interested in meeting with a hospice rep to get more information on their services.    Patient has never completed a living will or MPOA. His preference for surrogate decision maker would be his brother Kris [place]. Explained to patient that palliative care can assist with completion of MPOA paperwork whenever he is ready. He is currently a full code and not ready to make any changes to his code status.        Thank you for your consult. I will follow-up with patient. Please contact us if you have any additional questions.    Subjective:     HPI:   Mr. Terrell is a 53 y/o male with PMHx of chronic combined systolic and diastolic heart failure (EF 25%), afib on Coumadin, CAD, CVA, and history of PE who presented to [facility] ED on 12/24 for evaluation of shortness of breath and edema. He was just admitted to Hospital Medicine from 11/24-12/8 for CHF exacerbation. During that admission, he was evaluated by Cardiology who started him on Dobutamine and Diuril. He was resistant to HTS evaluation during that stay, which included recommendations for LVAD, transplant, and ICD. He was discharged home on Lasix 80mg PO BID. He endorsed compliance with his diuretic regimen, but despite this and following a low salt diet, he continued to have dyspnea, orthopnea, edema and weight gain. He also complained of mostly dry cough with occasional production of blood tinged sputum.     CXR in the ED showed cardiomegaly and pulmonary edema, along with a masslike opacity. CT chest was ordered to evaluate the mass, but he was  unable to lie flat to get it evaluated. He was given Lasix 80mg IV and was admitted to Hospital Medicine for further management. He was started on Lasix and  gtt.    Hospital Course:  No notes on file    Interval History: Received several doses of IV Diuril but diuresis was limited. Now responding well after Lasix gtt increased to 30 mg/hr. Remains on  gtt at 2.5 mcg/kg.     Past Medical History:   Diagnosis Date    Anticoagulant long-term use     Cardiomegaly     Chronic combined systolic and diastolic congestive heart failure     Coronary artery disease     Heart attack 2006    Hypertension     Hyperthyroidism, subclinical 1/2/2013    MI (myocardial infarction) 9/22/2013    MI in 2009      Paroxysmal atrial fibrillation     PE (pulmonary embolism) 1/1/2013    IN 2010     S/P ablation of atrial flutter 2008    Stroke 2009    no residual weaknesses       Past Surgical History:   Procedure Laterality Date    COLONOSCOPY N/A 12/2/2019    Procedure: COLONOSCOPY;  Surgeon: Scott Rosario MD;  Location: [facility] ([address] [place] [address]);  Service: Endoscopy;  Laterality: N/A;    ESOPHAGOGASTRODUODENOSCOPY N/A 12/2/2019    Procedure: EGD (ESOPHAGOGASTRODUODENOSCOPY);  Surgeon: Scott Rosario MD;  Location: [facility] ([address] [place] [address]);  Service: Endoscopy;  Laterality: N/A;    RADIOFREQUENCY ABLATION  01/08/2008    for atrial flutter       Review of patient's allergies indicates:   Allergen Reactions    Acetaminophen      Itching    Oxycodone-acetaminophen      Other reaction(s): Itching    Ace inhibitors Other (See Comments)     cough       Medications:  Continuous Infusions:   DOBUTamine 2.5 mcg/kg/min (01/02/20 0012)    furosemide (LASIX) 2 mg/mL infusion (non-titrating) 30 mg/hr (01/03/20 0704)     Scheduled Meds:   aspirin  81 mg Oral Daily    ferrous sulfate  325 mg Oral Daily    isosorbide-hydrALAZINE 20-37.5 mg  1 tablet Oral BID    lidocaine  1 patch Transdermal Q24H    methyl salicylate-menthol  15-10%   Topical (Top) TID    warfarin  7.5 mg Oral Daily     PRN Meds:acetaminophen, Dextrose 10% Bolus, Dextrose 10% Bolus, glucagon (human recombinant), glucose, glucose, melatonin, methocarbamol, morphine, ondansetron, promethazine (PHENERGAN) IVPB, senna-docusate 8.6-50 mg, sodium chloride, sodium chloride 0.9%, sodium chloride 0.9%    Family History     Problem Relation (Age of Onset)    Alcohol abuse Father    Hypertension Mother, Father, Sister, Brother    Stroke Mother        Tobacco Use    Smoking status: Never Smoker    Smokeless tobacco: Never Used   Substance and Sexual Activity    Alcohol use: No    Drug use: No    Sexual activity: Never       Review of Systems   Constitutional: Negative for appetite change and fatigue.   HENT: Negative for sore throat and trouble swallowing.    Respiratory: Positive for shortness of breath. Negative for cough and wheezing.    Cardiovascular: Positive for leg swelling. Negative for chest pain.   Gastrointestinal: Positive for constipation. Negative for diarrhea, nausea and vomiting.   Genitourinary: Negative for dysuria and hematuria.   Musculoskeletal: Positive for arthralgias (hips). Negative for back pain and neck pain.   Skin: Negative for rash and wound.   Neurological: Negative for dizziness and light-headedness.   Psychiatric/Behavioral: Negative for confusion and dysphoric mood. The patient is not nervous/anxious.      Objective:     Vital Signs (Most Recent):  Temp: 98 °F (36.7 °C) (01/03/20 0824)  Pulse: 105 (01/03/20 0824)  Resp: 16 (01/03/20 0824)  BP: 126/64 (01/03/20 0824)  SpO2: (!) 94 % (01/03/20 0824) Vital Signs (24h Range):  Temp:  [97.8 °F (36.6 °C)-99.2 °F (37.3 °C)] 98 °F (36.7 °C)  Pulse:  [] 105  Resp:  [16-20] 16  SpO2:  [93 %-98 %] 94 %  BP: (111-143)/(56-74) 126/64     Weight: 125.1 kg (275 lb 12.7 oz)  Body mass index is 40.73 kg/m².    Review of Symptoms  Symptom Assessment (ESAS 0-10 scale)   ESAS 0 1 2 3 4 5 6 7 8 9 10   Pain            X   Dyspnea  X            Anxiety X             Nausea X             Depression  X             Anorexia X             Fatigue X             Insomnia X             Restlessness  X             Agitation X             CAM / Delirium _X_ --  ___+   Constipation     __ --  _X_+   Diarrhea           _X_ --  ___+  Bowel Management Plan (BMP): Yes    Comments: Senna-Docusate ordered BID PRN    Pain Assessment: Location: bilateral hips  Quality: shooting  Quantity: 10/10 in intensity  Aggravating factors: worsens with movement or activity  Alleviating factors: improves with Morphine; dose due now      OME in 24 hours: 30    Performance Status: 50    Physical Exam   Constitutional: He is oriented to person, place, and time. He appears well-developed and well-nourished.   HENT:   Head: Normocephalic and atraumatic.   Eyes: Conjunctivae and EOM are normal.   Neck: Normal range of motion. Neck supple.   Cardiovascular: Regular rhythm. Tachycardia present.   Pulmonary/Chest: Effort normal. No respiratory distress.   Abdominal: Soft. There is no tenderness.   Musculoskeletal: Normal range of motion. He exhibits edema.   Neurological: He is alert and oriented to person, place, and time.   Skin: Skin is warm and dry.   Psychiatric: He has a normal mood and affect. His behavior is normal. Thought content normal.   Vitals reviewed.      Significant Labs: All pertinent labs within the past 24 hours have been reviewed.  CBC:   Recent Labs   Lab 01/03/20  0736   WBC 6.55   HGB 7.0*   HCT 24.3*   MCV 85        BMP:  Recent Labs   Lab 01/03/20  0736         K 3.8   CL 95   CO2 35*   BUN 85*   CREATININE 2.4*   CALCIUM 9.8   MG 2.2     LFT:  Lab Results   Component Value Date    AST 25 01/03/2020     (H) 04/21/2019    ALKPHOS 253 (H) 01/03/2020    BILITOT 1.4 (H) 01/03/2020     Albumin:   Albumin   Date Value Ref Range Status   01/03/2020 3.3 (L) 3.5 - 5.2 g/dL Final     Protein:   Total Protein   Date  Value Ref Range Status   2020 8.7 (H) 6.0 - 8.4 g/dL Final     Lactic acid:   Lab Results   Component Value Date    LACTATE 1.0 2019    LACTATE 1.0 2019       Significant Imaging: I have reviewed all pertinent imaging results/findings within the past 24 hours.    Advance Care Planning   Advanced Directives::  Living Will: No  LaPOST: No  Do Not Resuscitate Status: No  Medical Power of : No. Patient identifies brother Kris [place] ([phone]) as his surrogate decision maker.    Decision-Making Capacity: Patient answered questions       Living Arrangements: Lives alone    Psychosocial/Cultural:  Patient is unmarried and has no children. His parents are . He has 5 siblings and a lot of cousins and friends. He lives alone in [place] but says his brother Kris lives right next to him. 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 He says that his goal is to get healthy. He worries about dying.    Spiritual:     F- Melina and Belief: does not identify with a particular Yarsani but prays to God    I - Importance: somewhat important  .  C - Community: does not belong to a Zoroastrianism    A - Address in Care:  visits      > 50% of 70 min visit spent in chart review, face to face discussion of goals of care, symptom assessment, coordination of care and emotional support.    Vira Davila NP  Palliative Medicine  [facility]

## 2020-01-04 NOTE — PLAN OF CARE
Lasix GTT discontinued. Lasix 120mg IV administered. Strict intake and output maintained. Morphine 4mg IV push administered for 10/10 pain in back, right, and left hip. K 3.8 and replaced with 40mEq PO. POC includes hospice care and placing PICC for home . VSS, AOX4, no occurrences of N/V, no falls during shift. Pt up in chair with call light within reach. Will continue to monitor.

## 2020-01-05 LAB
ALBUMIN SERPL BCP-MCNC: 3.3 G/DL (ref 3.5–5.2)
ALP SERPL-CCNC: 240 U/L (ref 55–135)
ALT SERPL W/O P-5'-P-CCNC: 16 U/L (ref 10–44)
ANION GAP SERPL CALC-SCNC: 12 MMOL/L (ref 8–16)
AST SERPL-CCNC: 30 U/L (ref 10–40)
BASOPHILS # BLD AUTO: 0.04 K/UL (ref 0–0.2)
BASOPHILS NFR BLD: 0.4 % (ref 0–1.9)
BILIRUB SERPL-MCNC: 1.7 MG/DL (ref 0.1–1)
BUN SERPL-MCNC: 92 MG/DL (ref 6–20)
CALCIUM SERPL-MCNC: 9.9 MG/DL (ref 8.7–10.5)
CHLORIDE SERPL-SCNC: 93 MMOL/L (ref 95–110)
CO2 SERPL-SCNC: 34 MMOL/L (ref 23–29)
CREAT SERPL-MCNC: 2.8 MG/DL (ref 0.5–1.4)
DIFFERENTIAL METHOD: ABNORMAL
EOSINOPHIL # BLD AUTO: 0.9 K/UL (ref 0–0.5)
EOSINOPHIL NFR BLD: 8.9 % (ref 0–8)
ERYTHROCYTE [DISTWIDTH] IN BLOOD BY AUTOMATED COUNT: 22.1 % (ref 11.5–14.5)
EST. GFR  (AFRICAN AMERICAN): 28.5 ML/MIN/1.73 M^2
EST. GFR  (NON AFRICAN AMERICAN): 24.6 ML/MIN/1.73 M^2
GLUCOSE SERPL-MCNC: 105 MG/DL (ref 70–110)
HCT VFR BLD AUTO: 24.7 % (ref 40–54)
HGB BLD-MCNC: 7.2 G/DL (ref 14–18)
IMM GRANULOCYTES # BLD AUTO: 0.07 K/UL (ref 0–0.04)
IMM GRANULOCYTES NFR BLD AUTO: 0.7 % (ref 0–0.5)
INR PPP: 2.6 (ref 0.8–1.2)
LYMPHOCYTES # BLD AUTO: 0.8 K/UL (ref 1–4.8)
LYMPHOCYTES NFR BLD: 8 % (ref 18–48)
MAGNESIUM SERPL-MCNC: 2.3 MG/DL (ref 1.6–2.6)
MCH RBC QN AUTO: 25 PG (ref 27–31)
MCHC RBC AUTO-ENTMCNC: 29.1 G/DL (ref 32–36)
MCV RBC AUTO: 86 FL (ref 82–98)
MONOCYTES # BLD AUTO: 1.7 K/UL (ref 0.3–1)
MONOCYTES NFR BLD: 18.1 % (ref 4–15)
NEUTROPHILS # BLD AUTO: 6.1 K/UL (ref 1.8–7.7)
NEUTROPHILS NFR BLD: 63.9 % (ref 38–73)
NRBC BLD-RTO: 0 /100 WBC
PHOSPHATE SERPL-MCNC: 3.8 MG/DL (ref 2.7–4.5)
PLATELET # BLD AUTO: 223 K/UL (ref 150–350)
PMV BLD AUTO: 10.1 FL (ref 9.2–12.9)
POTASSIUM SERPL-SCNC: 4.1 MMOL/L (ref 3.5–5.1)
PROT SERPL-MCNC: 8.5 G/DL (ref 6–8.4)
PROTHROMBIN TIME: 24.5 SEC (ref 9–12.5)
RBC # BLD AUTO: 2.88 M/UL (ref 4.6–6.2)
SODIUM SERPL-SCNC: 139 MMOL/L (ref 136–145)
WBC # BLD AUTO: 9.5 K/UL (ref 3.9–12.7)

## 2020-01-05 PROCEDURE — 99233 SBSQ HOSP IP/OBS HIGH 50: CPT | Mod: ,,, | Performed by: HOSPITALIST

## 2020-01-05 PROCEDURE — 83735 ASSAY OF MAGNESIUM: CPT

## 2020-01-05 PROCEDURE — 80053 COMPREHEN METABOLIC PANEL: CPT

## 2020-01-05 PROCEDURE — 25000003 PHARM REV CODE 250: Performed by: HOSPITALIST

## 2020-01-05 PROCEDURE — 85025 COMPLETE CBC W/AUTO DIFF WBC: CPT

## 2020-01-05 PROCEDURE — 20600001 HC STEP DOWN PRIVATE ROOM

## 2020-01-05 PROCEDURE — 87040 BLOOD CULTURE FOR BACTERIA: CPT | Mod: 59

## 2020-01-05 PROCEDURE — 99233 PR SUBSEQUENT HOSPITAL CARE,LEVL III: ICD-10-PCS | Mod: ,,, | Performed by: HOSPITALIST

## 2020-01-05 PROCEDURE — 63600175 PHARM REV CODE 636 W HCPCS: Performed by: HOSPITALIST

## 2020-01-05 PROCEDURE — 84100 ASSAY OF PHOSPHORUS: CPT

## 2020-01-05 PROCEDURE — 85610 PROTHROMBIN TIME: CPT

## 2020-01-05 PROCEDURE — 36415 COLL VENOUS BLD VENIPUNCTURE: CPT

## 2020-01-05 RX ORDER — MORPHINE SULFATE 2 MG/ML
4 INJECTION, SOLUTION INTRAMUSCULAR; INTRAVENOUS EVERY 4 HOURS PRN
Status: DISCONTINUED | OUTPATIENT
Start: 2020-01-05 | End: 2020-01-06

## 2020-01-05 RX ORDER — FUROSEMIDE 10 MG/ML
120 INJECTION INTRAMUSCULAR; INTRAVENOUS ONCE
Status: COMPLETED | OUTPATIENT
Start: 2020-01-05 | End: 2020-01-05

## 2020-01-05 RX ADMIN — MORPHINE SULFATE 4 MG: 2 INJECTION, SOLUTION INTRAMUSCULAR; INTRAVENOUS at 11:01

## 2020-01-05 RX ADMIN — MORPHINE SULFATE 4 MG: 2 INJECTION, SOLUTION INTRAMUSCULAR; INTRAVENOUS at 06:01

## 2020-01-05 RX ADMIN — HYDRALAZINE HYDROCHLORIDE AND ISOSORBIDE DINITRATE 1 TABLET: 37.5; 2 TABLET, FILM COATED ORAL at 09:01

## 2020-01-05 RX ADMIN — FUROSEMIDE 30 MG/HR: 10 INJECTION, SOLUTION INTRAMUSCULAR; INTRAVENOUS at 08:01

## 2020-01-05 RX ADMIN — FUROSEMIDE 120 MG: 10 INJECTION, SOLUTION INTRAMUSCULAR; INTRAVENOUS at 08:01

## 2020-01-05 RX ADMIN — MORPHINE SULFATE 4 MG: 2 INJECTION, SOLUTION INTRAMUSCULAR; INTRAVENOUS at 04:01

## 2020-01-05 RX ADMIN — FUROSEMIDE 120 MG: 10 INJECTION, SOLUTION INTRAMUSCULAR; INTRAVENOUS at 09:01

## 2020-01-05 RX ADMIN — WARFARIN SODIUM 7.5 MG: 7.5 TABLET ORAL at 04:01

## 2020-01-05 RX ADMIN — MENTHOL, METHYL SALICYLATE: 10; 15 CREAM TOPICAL at 09:01

## 2020-01-05 RX ADMIN — ASPIRIN 81 MG: 81 TABLET, COATED ORAL at 09:01

## 2020-01-05 RX ADMIN — HYDRALAZINE HYDROCHLORIDE AND ISOSORBIDE DINITRATE 1 TABLET: 37.5; 2 TABLET, FILM COATED ORAL at 08:01

## 2020-01-05 RX ADMIN — FUROSEMIDE 120 MG: 10 INJECTION, SOLUTION INTRAMUSCULAR; INTRAVENOUS at 03:01

## 2020-01-05 RX ADMIN — DOBUTAMINE IN DEXTROSE 2.5 MCG/KG/MIN: 200 INJECTION, SOLUTION INTRAVENOUS at 09:01

## 2020-01-05 RX ADMIN — MORPHINE SULFATE 4 MG: 2 INJECTION, SOLUTION INTRAMUSCULAR; INTRAVENOUS at 08:01

## 2020-01-05 RX ADMIN — FERROUS SULFATE TAB EC 325 MG (65 MG FE EQUIVALENT) 325 MG: 325 (65 FE) TABLET DELAYED RESPONSE at 09:01

## 2020-01-05 NOTE — NURSING
Patient requests chocolate chip cookie instead of sugar cookie.  KAREEM Howard M.D. approved request, adjusted diet.  Called Dietary Services @ [phone] to request Chocolate Chip Cookie, who referred to Rafia, Supervisor @ [phone].  Patient notified.

## 2020-01-05 NOTE — NURSING
Tele called about patient having 20 beat run of Copinych. Patient reports feeling no symptoms during event. Last K+ = 4.6 on 1/4 @0425. Last Mg = 2.3 on 1/4 @0425. Dr. Lenz on call [place] [facility] [place]. No new orders at this time. VSS. Will continue to monitor.

## 2020-01-05 NOTE — PLAN OF CARE
Reviewed plan of care with patient.  Patient is alert, oriented x 4, independent, ambulatory, and runs Afib with runs of VTACH on the monitor (1233 hours 6 runs of VTACH).  Plan is PICC placement 1/7/20.  X-Ray Chest AP Portable completed.  Blood cultures x 2 collected pending results.  DOBUTamine infusing @ 2.5 mcg/kg/min.  Morphine 4 mg changed to q4h for pain management.  K+ = 4.1.  Strict I&O's.  Fluid restriction 1200 ml.   Daily weights.  Patient refused CAMI Hose.  Patient refused PIVs placed 12/27/20.  Patient has remained free of falls/trauma/injury.  Patient verbalized understanding of all instructions.

## 2020-01-05 NOTE — PROGRESS NOTES
Progress Note  Hospital Medicine    Provider team: [facility] [place] [facility]  Admit Date: 12/24/2019  Encounter Date: 01/05/2020     SUBJECTIVE:     Follow-up Visit for: Acute on chronic combined systolic and diastolic congestive heart failure    HPI (See H&P for complete P,F,SHx):  54M with chronic combined systolic and diastolic heart failure (EF 25%), afib on Coumadin, CAD, CVA, and history of PE who presents to the ED for evaluation of shortness of breath and edema.  He was just admitted to Hospital Medicine from 11/24-12/8 for a CHF exacerbation.  During that admission, he was evaluated by Cardiology who started him on Dobutamine and Diuril.  He was resistant to HTS evaluation during that stay, which included recommendations for LVAD, transplant, and ICD.  He was discharged home on Lasix 80mg PO BID.  He endorses compliance with his diuretic regimen, but despite this and following a low salt diet, he has continued to gain weight, have lower extremity and abdominal swelling, as well as shortness of breath.  He is so short of breath that he has to sleep in a chair, and has been unable to sleep because of his breathing.  He denies any chest pain.  He does have a cough that is mostly dry, but occasionally productive of blood tinged sputum.  He was discharged weight a weight of 259lbs, and claims that is around his dry weight.  He is currently 286lbs.       Upon arrival to the ED, vitals were /83, HR 88, RR 22.  Labs showed Hemoglobin 7.3, INR 1.5, Creatinine 1.8, Alk Phos 278,  with Trop 0.128. CXR showed cardiomegaly and pulmonary edema, along with a masslike opacity. CT chest was attempted to be ordered to evaluate the mass, but he was unable to lie flat to get it evaluated.  He was given Lasix 80mg IV and was admitted to Hospital Medicine for further management.    TTE 11/27/2019  · Severely decreased LVSF.  · The estimated ejection fraction is 25%  · Concentric left ventricular hypertrophy.  · Global  hypokinetic wall motion.  · Moderate right ventricular enlargement.  · Moderately reduced RVSF.  · Severe biatrial enlargement.  · Moderate mitral regurgitation.  · Moderate tricuspid regurgitation.  · The estimated PA systolic pressure is 40 mm Hg  · Elevated central venous pressure (15 mm Hg).  · Atrial fibrillation observed.    Interval history:  Patient's weight stagnant and UOP has tapered. Will trial back on the lasix infusion, potentially adding diuril. Noted his weight increase, TBili increase, and Cr increase. Will consider to have co-management cardiology assist. Patient offers no new complaints today. We continue to optimize in anticipation of discharge home with hospice. The patient did have low grade fever that rapidly resolved overnight. I do suspect it might be due to transfusion. Will perform fever w/u. Hold off on PICC placement until blood culture definitively negative.    Wt Readings from Last 30 Encounters:   01/04/20 126.3 kg (278 lb 7.1 oz)   12/07/19 117.6 kg (259 lb 4.2 oz)   10/23/19 108.9 kg (240 lb)   09/09/19 108.9 kg (240 lb)   08/04/19 105.2 kg (232 lb)   07/18/19 108.2 kg (238 lb 8.6 oz)   06/20/19 118.9 kg (262 lb 2 oz)   05/21/19 121.1 kg (267 lb)   05/16/19 116.3 kg (256 lb 6.3 oz)   04/20/19 127.5 kg (281 lb 1.4 oz)   01/31/19 113.4 kg (250 lb)   11/04/18 123.1 kg (271 lb 6.2 oz)   08/31/18 119.6 kg (263 lb 10.7 oz)   07/28/18 113.4 kg (250 lb)   07/10/18 120.7 kg (266 lb)   05/03/18 117.9 kg (259 lb 14.8 oz)   03/30/18 117.9 kg (260 lb)   02/21/18 117.9 kg (260 lb)   01/12/18 113.4 kg (250 lb)   12/10/17 117.9 kg (260 lb)   11/11/17 113.9 kg (251 lb)   11/01/17 111.8 kg (246 lb 7.6 oz)   10/26/17 118.5 kg (261 lb 3.2 oz)   08/13/17 117.9 kg (260 lb)   08/12/17 117.9 kg (260 lb)   05/10/17 117.9 kg (260 lb)   04/05/17 108.4 kg (239 lb 1.4 oz)   02/01/17 108.9 kg (240 lb)   12/15/16 119.4 kg (263 lb 3.7 oz)   08/26/16 112.5 kg (248 lb)     Review of Systems:  Review of Systems    Constitutional: Negative for chills and fever.   HENT: Negative for congestion and sore throat.    Eyes: Negative for photophobia, pain and discharge.   Respiratory: Positive for shortness of breath. Negative for cough, hemoptysis and sputum production.    Cardiovascular: Positive for orthopnea, leg swelling and PND. Negative for chest pain and palpitations.   Gastrointestinal: Negative for abdominal pain, diarrhea, nausea and vomiting.   Genitourinary: Negative for dysuria and urgency.   Musculoskeletal: Negative for myalgias and neck pain.   Skin: Negative for itching and rash.   Neurological: Negative for sensory change, focal weakness and headaches.   Endo/Heme/Allergies: Negative for polydipsia. Does not bruise/bleed easily.   Psychiatric/Behavioral: Negative for depression and suicidal ideas.     Past Medical History:   Diagnosis Date    Anticoagulant long-term use     Cardiomegaly     Chronic combined systolic and diastolic congestive heart failure     Coronary artery disease     Heart attack 2006    Hypertension     Hyperthyroidism, subclinical 1/2/2013    MI (myocardial infarction) 9/22/2013    MI in 2009      Paroxysmal atrial fibrillation     PE (pulmonary embolism) 1/1/2013    IN 2010     S/P ablation of atrial flutter 2008    Stroke 2009    no residual weaknesses     Social History     Socioeconomic History    Marital status: Single     Spouse name: Not on file    Number of children: Not on file    Years of education: Not on file    Highest education level: Not on file   Occupational History    Not on file   Social Needs    Financial resource strain: Not on file    Food insecurity:     Worry: Not on file     Inability: Not on file    Transportation needs:     Medical: Not on file     Non-medical: Not on file   Tobacco Use    Smoking status: Never Smoker    Smokeless tobacco: Never Used   Substance and Sexual Activity    Alcohol use: No    Drug use: No    Sexual activity: Never  "  Lifestyle    Physical activity:     Days per week: Not on file     Minutes per session: Not on file    Stress: Not on file   Relationships    Social connections:     Talks on phone: Not on file     Gets together: Not on file     Attends Orthodox service: Not on file     Active member of club or organization: Not on file     Attends meetings of clubs or organizations: Not on file     Relationship status: Not on file   Other Topics Concern    Not on file   Social History Narrative    Not on file       OBJECTIVE:       Intake/Output Summary (Last 24 hours) at 1/5/2020 1000  Last data filed at 1/5/2020 0954  Gross per 24 hour   Intake 2105 ml   Output 2350 ml   Net -245 ml     Vital Signs Range (Last 24H):  Temp:  [97.6 °F (36.4 °C)-100.4 °F (38 °C)]   Pulse:  []   Resp:  [14-20]   BP: (118-167)/(55-93)   SpO2:  [90 %-94 %]   Body mass index is 41.12 kg/m².    Objective:  General Appearance:  Comfortable, well-appearing, in no acute distress and not in pain.    Vital signs: (most recent): Blood pressure (!) 119/55, pulse 94, temperature 98.2 °F (36.8 °C), temperature source Oral, resp. rate 16, height 5' 9" (1.753 m), weight 126.3 kg (278 lb 7.1 oz), SpO2 (!) 94 %.  No fever.    Output: Producing urine and producing stool.    HEENT: Normal HEENT exam.  (+JVD)    Lungs:  Normal effort.  Breath sounds clear to auscultation.  He is not in respiratory distress.  There are rales.  No wheezes.    Heart: Normal rate.  Regular rhythm.  S1 normal and S2 normal.  Positive for murmur.    Chest: Symmetric chest wall expansion.   Abdomen: Abdomen is soft.  Bowel sounds are normal.   There is no abdominal tenderness.     Extremities: Normal range of motion.  There is venous stasis and dependent edema.  There is no deformity, effusion or local swelling.    Pulses: Distal pulses are intact.    Neurological: Patient is alert and oriented to person, place and time.  Normal strength.    Pupils:  Pupils are equal, round, and " reactive to light.    Skin:  Warm and dry.      Medications:  Medication list was reviewed in EPIC and changes noted under Assessment/Plan and MAR.    Laboratory:  Recent Labs     01/05/20  0341   WBC 9.50   RBC 2.88*   HGB 7.2*   HCT 24.7*      MCV 86   MCH 25.0*   MCHC 29.1*   GRAN 63.9  6.1   LYMPH 8.0*  0.8*   MONO 18.1*  1.7*   EOS 0.9*      Recent Labs     01/05/20  0342         K 4.1   CL 93*   CO2 34*   BUN 92*   CREATININE 2.8*   CALCIUM 9.9   ANIONGAP 12   MG 2.3   PHOS 3.8     Prior records reviewed.    ECHO reviewed:  · Severely decreased left ventricular systolic function. The estimated ejection fraction is 25%  · Concentric left ventricular hypertrophy.  · Global hypokinetic wall motion.  · Moderate right ventricular enlargement.  · Moderately reduced right ventricular systolic function.  · Severe biatrial enlargement.  · Moderate mitral regurgitation.  · Moderate tricuspid regurgitation.  · The estimated PA systolic pressure is 40 mm Hg  · Elevated central venous pressure (15 mm Hg).  · Atrial fibrillation observed.      Imaging reviewed  Imaging Results          CT Chest Without Contrast (No Result on File)                X-Ray Chest AP Portable (Final result)  Result time 12/24/19 19:09:07    Final result by Zeb Hirsch MD (12/24/19 19:09:07)                 Impression:      Masslike opacity overlying the right lower lung zone, similar to prior exam.  Right pleural fluid not excluded.  Chest CT recommended for further evaluation of right lung masslike opacity.    Patchy bilateral pulmonary opacities, similar to prior exam.    Cardiomegaly.    Electronically signed by resident: Cristofer Reddy  Date:    12/24/2019  Time:    18:48    Electronically signed by: Zeb Hirsch MD  Date:    12/24/2019  Time:    19:09             Narrative:    EXAMINATION:  XR CHEST AP PORTABLE    CLINICAL HISTORY:  CHF;    TECHNIQUE:  Single frontal view of the chest was  performed.    COMPARISON:  Chest radiograph 12/03/2019    FINDINGS:  Cardiac leads overlie the chest wall.    Redemonstration of extensive bilateral patchy opacities, more prominent on the right with a focal area of masslike opacity in the right lower lung zone.  No significant change from prior exam.  Left costophrenic angle is visible and right sided pleural fluid not excluded noting presence of opacification.  No pneumothorax.    The cardiac silhouette is enlarged.  Hilar and mediastinal contours are unchanged.    Bones are intact.                                  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 [I13.0]  Yes    Elevated alkaline phosphatase level [R74.8]  Yes    Coronary artery disease [I25.10]  Yes     Chronic    Stage 3 chronic kidney disease [N18.3]  Yes    Subtherapeutic international normalized ratio (INR) [R79.1]  Yes    Personal history of cerebral embolism [Z86.79]  Not Applicable    Personal history of venous thrombosis and embolism [Z86.718]  Not Applicable    Personal history of MI (myocardial infarction) [I25.2]  Not Applicable    Essential hypertension [I10]  Yes     Chronic    Atrial fibrillation, chronic [I48.20]  Yes     Chronic    Chronic anticoagulation [Z79.01]  Not Applicable     Chronic    Cardiomyopathy [I42.9]  Yes     Chronic      Resolved Hospital Problems   No resolved problems to display.      Acute on Chronic Combined Systolic and Diastolic Heart Failure  Cardiomyopathy  · CHF Pathway initiated  · Last 2D echo Nov 2019 with EF 25%  ·  and CXR with pulmonary edema and cardiomegaly  · Started on Lasix gtt.   · Received several doses of IV  Diuril for augmentation.   · Cont  gtt 2.5.   · Transition back to lasix gtt  · Consider addition of diuril  · Patient agreeable to palliative care discussion  · Will plan for PICC line for home   · Goal diuresis 2-3L/day.  Home diuretic dose:  Lasix 80mg PO BID  · BID BMP (with morning labs and at 4pm)  · Strict I&Os with daily weights.  · Hold BB, okay to c/w BiDil     JEAN-PIERRE on CKD3  · Baseline 1.8  · Cardiorenal, improving w/ diuresis and     Chronic AFib  Long Term Use of Anticoagulants  Subtherapeutic INR  · Chronic issue  · HR in 100-120's  · Continue Warfarin at increased dose  · Pharmacy consulted for dosing management     Essential HTN  · Chronic and stable  · Continue Bidil BID     CAD  History of MI  · Chronic and stable  · Continue Aspirin 81mg PO daily  · Continue Bidil BID     Anemia  · Hgb > 7  · Received 5 units PRBCs on prior hospital admission  · Continue PO iron  · Type and screen obtained  · Monitor for bleeding/need for transfusion  · Transfused 1U pRBC after episode of epistaxis noted     History of VTE  History of Stroke  · Chronic and stable  · Continue Aspirin 81mg PO daily  · Continue Warfarin as above      Elevated Alk Phos  · Chronic and stable  · Monitor     Benign Hypertensive Heart and Kidney Disease with HF and CKD  · As above     Abnormal CT  · Needs CT chest when able to lie flat or outpt to evaluate masslike opacity seen on CXR     Diet:  Low Sodium, 1.2L fluid restriction  VTE PPx:  Warfarin  Goals of Care:  Full     High Risk Conditions:   Patient is currently on drug therapy requiring intensive monitoring for toxicity: Lasix IVP    Anticipated discharge date and disposition:   Pending diuresis.    Markus Howard MD  [facility]

## 2020-01-05 NOTE — NURSING
"Spoke with pt about needing PIVs changed out. Pt absolutely refusing to be stuck stating "once it's out, it's out".  "

## 2020-01-05 NOTE — NURSING
"Patient request Rosenda, , come back after 1 hour to draw labs because he is tired.  Patient refused to provide name and other information needed for lab collect.  Bedside discussed importance of lab collect for patient care.  Patient stated, "I couldn't breath, I was trying to catch my breath.  If she can't understand that she is in the wrong field."  Labs were not collected at this time and 1 hour later.  "

## 2020-01-05 NOTE — NURSING
Patient's interest is car motor rebuilding/repair if you need an area of interest to relax his presentation a bit.

## 2020-01-05 NOTE — PLAN OF CARE
Plan of care discussed with patient; verbalized understanding using teach-back method. No acute events overnight. Patient remained free of falls and injuries. Continuing dobutamine mcg. Monitoring daily weights and strict I's and O's. Fluid restriction discussed. PRN zofran for nausea. Denies having SoB or CP. VSS. Will continue to monitor.

## 2020-01-06 LAB
ALBUMIN SERPL BCP-MCNC: 3.3 G/DL (ref 3.5–5.2)
ALP SERPL-CCNC: 247 U/L (ref 55–135)
ALT SERPL W/O P-5'-P-CCNC: 16 U/L (ref 10–44)
ANION GAP SERPL CALC-SCNC: 16 MMOL/L (ref 8–16)
ANION GAP SERPL CALC-SCNC: 16 MMOL/L (ref 8–16)
AST SERPL-CCNC: 26 U/L (ref 10–40)
BASOPHILS # BLD AUTO: 0.03 K/UL (ref 0–0.2)
BASOPHILS NFR BLD: 0.3 % (ref 0–1.9)
BILIRUB SERPL-MCNC: 1.5 MG/DL (ref 0.1–1)
BUN SERPL-MCNC: 100 MG/DL (ref 6–20)
BUN SERPL-MCNC: 100 MG/DL (ref 6–20)
CALCIUM SERPL-MCNC: 9.7 MG/DL (ref 8.7–10.5)
CALCIUM SERPL-MCNC: 9.7 MG/DL (ref 8.7–10.5)
CHLORIDE SERPL-SCNC: 93 MMOL/L (ref 95–110)
CHLORIDE SERPL-SCNC: 93 MMOL/L (ref 95–110)
CO2 SERPL-SCNC: 31 MMOL/L (ref 23–29)
CO2 SERPL-SCNC: 31 MMOL/L (ref 23–29)
CREAT SERPL-MCNC: 3.1 MG/DL (ref 0.5–1.4)
CREAT SERPL-MCNC: 3.1 MG/DL (ref 0.5–1.4)
DIFFERENTIAL METHOD: ABNORMAL
EOSINOPHIL # BLD AUTO: 1 K/UL (ref 0–0.5)
EOSINOPHIL NFR BLD: 10.6 % (ref 0–8)
ERYTHROCYTE [DISTWIDTH] IN BLOOD BY AUTOMATED COUNT: 22.1 % (ref 11.5–14.5)
EST. GFR  (AFRICAN AMERICAN): 25.2 ML/MIN/1.73 M^2
EST. GFR  (AFRICAN AMERICAN): 25.2 ML/MIN/1.73 M^2
EST. GFR  (NON AFRICAN AMERICAN): 21.8 ML/MIN/1.73 M^2
EST. GFR  (NON AFRICAN AMERICAN): 21.8 ML/MIN/1.73 M^2
GLUCOSE SERPL-MCNC: 136 MG/DL (ref 70–110)
GLUCOSE SERPL-MCNC: 136 MG/DL (ref 70–110)
HCT VFR BLD AUTO: 25.2 % (ref 40–54)
HGB BLD-MCNC: 7.2 G/DL (ref 14–18)
IMM GRANULOCYTES # BLD AUTO: 0.07 K/UL (ref 0–0.04)
IMM GRANULOCYTES NFR BLD AUTO: 0.7 % (ref 0–0.5)
INR PPP: 2.6 (ref 0.8–1.2)
LYMPHOCYTES # BLD AUTO: 0.9 K/UL (ref 1–4.8)
LYMPHOCYTES NFR BLD: 8.9 % (ref 18–48)
MAGNESIUM SERPL-MCNC: 2.5 MG/DL (ref 1.6–2.6)
MCH RBC QN AUTO: 24.7 PG (ref 27–31)
MCHC RBC AUTO-ENTMCNC: 28.6 G/DL (ref 32–36)
MCV RBC AUTO: 86 FL (ref 82–98)
MONOCYTES # BLD AUTO: 1.9 K/UL (ref 0.3–1)
MONOCYTES NFR BLD: 19.4 % (ref 4–15)
NEUTROPHILS # BLD AUTO: 5.8 K/UL (ref 1.8–7.7)
NEUTROPHILS NFR BLD: 60.1 % (ref 38–73)
NRBC BLD-RTO: 0 /100 WBC
PHOSPHATE SERPL-MCNC: 3.9 MG/DL (ref 2.7–4.5)
PLATELET # BLD AUTO: 219 K/UL (ref 150–350)
PLATELET BLD QL SMEAR: ABNORMAL
PMV BLD AUTO: 10 FL (ref 9.2–12.9)
POTASSIUM SERPL-SCNC: 4.3 MMOL/L (ref 3.5–5.1)
POTASSIUM SERPL-SCNC: 4.3 MMOL/L (ref 3.5–5.1)
PROT SERPL-MCNC: 8.9 G/DL (ref 6–8.4)
PROTHROMBIN TIME: 25 SEC (ref 9–12.5)
RBC # BLD AUTO: 2.92 M/UL (ref 4.6–6.2)
SODIUM SERPL-SCNC: 140 MMOL/L (ref 136–145)
SODIUM SERPL-SCNC: 140 MMOL/L (ref 136–145)
WBC # BLD AUTO: 9.6 K/UL (ref 3.9–12.7)

## 2020-01-06 PROCEDURE — 63600175 PHARM REV CODE 636 W HCPCS: Performed by: HOSPITALIST

## 2020-01-06 PROCEDURE — 85610 PROTHROMBIN TIME: CPT

## 2020-01-06 PROCEDURE — 25000003 PHARM REV CODE 250: Performed by: HOSPITALIST

## 2020-01-06 PROCEDURE — 80053 COMPREHEN METABOLIC PANEL: CPT

## 2020-01-06 PROCEDURE — 97110 THERAPEUTIC EXERCISES: CPT

## 2020-01-06 PROCEDURE — 85025 COMPLETE CBC W/AUTO DIFF WBC: CPT

## 2020-01-06 PROCEDURE — 36415 COLL VENOUS BLD VENIPUNCTURE: CPT

## 2020-01-06 PROCEDURE — 99233 PR SUBSEQUENT HOSPITAL CARE,LEVL III: ICD-10-PCS | Mod: ,,, | Performed by: HOSPITALIST

## 2020-01-06 PROCEDURE — 84100 ASSAY OF PHOSPHORUS: CPT

## 2020-01-06 PROCEDURE — 99233 SBSQ HOSP IP/OBS HIGH 50: CPT | Mod: ,,, | Performed by: NURSE PRACTITIONER

## 2020-01-06 PROCEDURE — 97530 THERAPEUTIC ACTIVITIES: CPT

## 2020-01-06 PROCEDURE — 99233 PR SUBSEQUENT HOSPITAL CARE,LEVL III: ICD-10-PCS | Mod: ,,, | Performed by: NURSE PRACTITIONER

## 2020-01-06 PROCEDURE — 99233 SBSQ HOSP IP/OBS HIGH 50: CPT | Mod: ,,, | Performed by: HOSPITALIST

## 2020-01-06 PROCEDURE — 83735 ASSAY OF MAGNESIUM: CPT

## 2020-01-06 PROCEDURE — 20600001 HC STEP DOWN PRIVATE ROOM

## 2020-01-06 RX ORDER — MORPHINE SULFATE 2 MG/ML
4 INJECTION, SOLUTION INTRAMUSCULAR; INTRAVENOUS EVERY 6 HOURS PRN
Status: DISCONTINUED | OUTPATIENT
Start: 2020-01-06 | End: 2020-01-18 | Stop reason: HOSPADM

## 2020-01-06 RX ORDER — OXYCODONE HYDROCHLORIDE 10 MG/1
10 TABLET ORAL EVERY 4 HOURS PRN
Status: DISCONTINUED | OUTPATIENT
Start: 2020-01-06 | End: 2020-01-07

## 2020-01-06 RX ADMIN — MORPHINE SULFATE 4 MG: 2 INJECTION, SOLUTION INTRAMUSCULAR; INTRAVENOUS at 09:01

## 2020-01-06 RX ADMIN — MENTHOL, METHYL SALICYLATE: 10; 15 CREAM TOPICAL at 09:01

## 2020-01-06 RX ADMIN — MENTHOL, METHYL SALICYLATE: 10; 15 CREAM TOPICAL at 02:01

## 2020-01-06 RX ADMIN — WARFARIN SODIUM 7.5 MG: 7.5 TABLET ORAL at 05:01

## 2020-01-06 RX ADMIN — DOBUTAMINE IN DEXTROSE 2.5 MCG/KG/MIN: 200 INJECTION, SOLUTION INTRAVENOUS at 10:01

## 2020-01-06 RX ADMIN — CHLOROTHIAZIDE SODIUM 500 MG: 500 INJECTION, POWDER, LYOPHILIZED, FOR SOLUTION INTRAVENOUS at 02:01

## 2020-01-06 RX ADMIN — FERROUS SULFATE TAB EC 325 MG (65 MG FE EQUIVALENT) 325 MG: 325 (65 FE) TABLET DELAYED RESPONSE at 09:01

## 2020-01-06 RX ADMIN — FUROSEMIDE 30 MG/HR: 10 INJECTION, SOLUTION INTRAMUSCULAR; INTRAVENOUS at 08:01

## 2020-01-06 RX ADMIN — OXYCODONE HYDROCHLORIDE 10 MG: 10 TABLET ORAL at 08:01

## 2020-01-06 RX ADMIN — MORPHINE SULFATE 4 MG: 2 INJECTION, SOLUTION INTRAMUSCULAR; INTRAVENOUS at 12:01

## 2020-01-06 RX ADMIN — FUROSEMIDE 30 MG/HR: 10 INJECTION, SOLUTION INTRAMUSCULAR; INTRAVENOUS at 02:01

## 2020-01-06 RX ADMIN — MORPHINE SULFATE 4 MG: 2 INJECTION, SOLUTION INTRAMUSCULAR; INTRAVENOUS at 10:01

## 2020-01-06 RX ADMIN — OXYCODONE HYDROCHLORIDE 10 MG: 10 TABLET ORAL at 02:01

## 2020-01-06 RX ADMIN — MORPHINE SULFATE 4 MG: 2 INJECTION, SOLUTION INTRAMUSCULAR; INTRAVENOUS at 05:01

## 2020-01-06 RX ADMIN — ASPIRIN 81 MG: 81 TABLET, COATED ORAL at 09:01

## 2020-01-06 RX ADMIN — MENTHOL, METHYL SALICYLATE: 10; 15 CREAM TOPICAL at 08:01

## 2020-01-06 RX ADMIN — HYDRALAZINE HYDROCHLORIDE AND ISOSORBIDE DINITRATE 1 TABLET: 37.5; 2 TABLET, FILM COATED ORAL at 09:01

## 2020-01-06 RX ADMIN — HYDRALAZINE HYDROCHLORIDE AND ISOSORBIDE DINITRATE 1 TABLET: 37.5; 2 TABLET, FILM COATED ORAL at 08:01

## 2020-01-06 NOTE — PLAN OF CARE
Plan of care discussed with patient. Questions and concerns regarding pain management. MD notified. VS stable. Able to shift weight as needed. Complain of lower back pain. Pain medication given as ordered. 20G to RFA and MILLIE intact and flushing without complications. Plan to get PICC 1/7/2020. Able to swallow meds whole. Appetite adequate. Will continue to monitor.

## 2020-01-06 NOTE — CHAPLAIN
Stopped in to visit palliative pt-- this would be my 4th visit. He had a friend visiting (which is good!) and friend was just hanging out while pt in the bathroom for long time. I told friend I was glad he was there for pt and let pt know I stopped by again to check on him.

## 2020-01-06 NOTE — PLAN OF CARE
Goals reviewed and remain appropriate.   Pat Christianson OTR/L  Pager #: [phone]  1/6/2020    Problem: Occupational Therapy Goal  Goal: Occupational Therapy Goal  Description  Goals to be met by 1/13/20:    Patient will increase functional independence with ADLs by performing:    LE Dressing with Supervision.  Grooming while standing with Supervision.  Toileting from toilet with Supervision for hygiene and clothing management.   Toilet transfer to toilet with Supervision.     Outcome: Ongoing, Progressing

## 2020-01-06 NOTE — PLAN OF CARE
Plan of care discussed with patient; verbalized understanding using teach-back method. No acute events overnight. Patient remained free of falls and injuries. Continuing dobutamine mcg. Restarted Lasix gtt 30mg. Monitoring daily weights and strict I's and O's. Monitoring telemetry. Fluid restriction discussed. PRN morphine for pain management. Denies having SoB or CP. VSS. Will continue to monitor.

## 2020-01-06 NOTE — NURSING
Patient has 2 PIVs placed on 12/27/2020.  Staff has repeatedly recommended site rotation, explaining infection prevention to patient, who has consistently refused.  Patient did state that he would think about it and may let staff rotate sites in the morning.  Night team notified.

## 2020-01-06 NOTE — PLAN OF CARE
01/06/20 0839   Discharge Reassessment   Assessment Type Discharge Planning Reassessment   Discharge Plan A Hospice/home   Discharge Plan B Home Health

## 2020-01-06 NOTE — PT/OT/SLP PROGRESS
Physical Therapy      Patient Name:  Hamlet Terrell   MRN:  [id number]    Patient not seen today secondary to Pain, Patient unwilling to participate. Attempted to see pt this PM following his OT session; however, pt declining PT this date 2* continued hip pain. While PT at bedside, pt asking therapist about UE tremors and stating tremors are now interfering with his ability to feed himself. Discussed weighted utensils and techniques for steadying UE. Pt v/u. Will follow-up at next scheduled session as able.    Eboni Pittman, PT, DPT   1/6/2020  [phone]

## 2020-01-06 NOTE — NURSING
Paged Dr. Nolasco with [facility] about patient having 28 beat vtach. Patient asymptomatic during event. Orders to draw morning labs STAT. VSS. Will continue to monitor.

## 2020-01-06 NOTE — PROGRESS NOTES
Progress Note  Hospital Medicine    [place] [facility]  Admit Date: 12/24/2019  Encounter Date: 01/06/2020     SUBJECTIVE:     Follow-up Visit for: Acute on chronic combined systolic and diastolic congestive heart failure    HPI (See H&P for complete P,F,SHx):  54M with chronic combined systolic and diastolic heart failure (EF 25%), afib on Coumadin, CAD, CVA, and history of PE who presents to the ED for evaluation of shortness of breath and edema.  He was just admitted to Hospital Medicine from 11/24-12/8 for a CHF exacerbation.  During that admission, he was evaluated by Cardiology who started him on Dobutamine and Diuril.  He was resistant to HTS evaluation during that stay, which included recommendations for LVAD, transplant, and ICD.  He was discharged home on Lasix 80mg PO BID.  He endorses compliance with his diuretic regimen, but despite this and following a low salt diet, he has continued to gain weight, have lower extremity and abdominal swelling, as well as shortness of breath.  He is so short of breath that he has to sleep in a chair, and has been unable to sleep because of his breathing.  He denies any chest pain.  He does have a cough that is mostly dry, but occasionally productive of blood tinged sputum.  He was discharged weight a weight of 259lbs, and claims that is around his dry weight.  He is currently 286lbs.       Upon arrival to the ED, vitals were /83, HR 88, RR 22.  Labs showed Hemoglobin 7.3, INR 1.5, Creatinine 1.8, Alk Phos 278,  with Trop 0.128. CXR showed cardiomegaly and pulmonary edema, along with a masslike opacity. CT chest was attempted to be ordered to evaluate the mass, but he was unable to lie flat to get it evaluated.  He was given Lasix 80mg IV and was admitted to Hospital Medicine for further management.    TTE 11/27/2019  · Severely decreased LVSF.  · The estimated ejection fraction is 25%  · Concentric left ventricular hypertrophy.  · Global  "hypokinetic wall motion.  · Moderate right ventricular enlargement.  · Moderately reduced RVSF.  · Severe biatrial enlargement.  · Moderate mitral regurgitation.  · Moderate tricuspid regurgitation.  · The estimated PA systolic pressure is 40 mm Hg  · Elevated central venous pressure (15 mm Hg).  · Atrial fibrillation observed.    Hospital course:  Patient started on IV diuretics and  infusion for diagnosis of acute on chronic combined heart failure. His weight was stagnant and UOP tapered, so he was transitioned to IVP lasix for IV lasix gtt as this was actually effective during prior admission under guidance from co-management cardiology. However, the patient actually worsened symptomatically and TBili increased along with Cr and weight increase. The patient is now started back on lasix infusion with diuril augmentation. The patient's goal is to be comfortable to tolerate transition to home with hospice. Will speak with patient more about avoiding admission and doing everything to be comfortable at home. Patient remains a full code. The patient's course is also c/b epistaxis, possibly related to his decompensated picture. He has required pRBC transfusion. He refuses to apply pressure to the bridge of his nose as he says he cannot breathe through his mouth.  The patient's course is also c/b fever, likely due to pRBC transfusion. Awaiting 48h negative blood culture, which will be on 1/7/2020, prior to placing PICC line for home .  Will consider involving co-management for assistance in diuresis should he still remain stagnant. Patient is not a candidate for long term HD.    Interval history:  The patient reports frustration with epistaxis. His breathing is "okay". He still has significant edema. UOP is insufficient to him. He reports improved pain in hip. All questions answered to patient satisfaction.    Wt Readings from Last 30 Encounters:   01/04/20 126.3 kg (278 lb 7.1 oz)   12/07/19 117.6 kg (259 lb 4.2 " oz)   10/23/19 108.9 kg (240 lb)   09/09/19 108.9 kg (240 lb)   08/04/19 105.2 kg (232 lb)   07/18/19 108.2 kg (238 lb 8.6 oz)   06/20/19 118.9 kg (262 lb 2 oz)   05/21/19 121.1 kg (267 lb)   05/16/19 116.3 kg (256 lb 6.3 oz)   04/20/19 127.5 kg (281 lb 1.4 oz)   01/31/19 113.4 kg (250 lb)   11/04/18 123.1 kg (271 lb 6.2 oz)   08/31/18 119.6 kg (263 lb 10.7 oz)   07/28/18 113.4 kg (250 lb)   07/10/18 120.7 kg (266 lb)   05/03/18 117.9 kg (259 lb 14.8 oz)   03/30/18 117.9 kg (260 lb)   02/21/18 117.9 kg (260 lb)   01/12/18 113.4 kg (250 lb)   12/10/17 117.9 kg (260 lb)   11/11/17 113.9 kg (251 lb)   11/01/17 111.8 kg (246 lb 7.6 oz)   10/26/17 118.5 kg (261 lb 3.2 oz)   08/13/17 117.9 kg (260 lb)   08/12/17 117.9 kg (260 lb)   05/10/17 117.9 kg (260 lb)   04/05/17 108.4 kg (239 lb 1.4 oz)   02/01/17 108.9 kg (240 lb)   12/15/16 119.4 kg (263 lb 3.7 oz)   08/26/16 112.5 kg (248 lb)     Review of Systems:  Review of Systems   Constitutional: Negative for chills and fever.   HENT: Negative for congestion and sore throat.    Eyes: Negative for photophobia, pain and discharge.   Respiratory: Positive for shortness of breath. Negative for cough, hemoptysis and sputum production.    Cardiovascular: Positive for orthopnea, leg swelling and PND. Negative for chest pain and palpitations.   Gastrointestinal: Negative for abdominal pain, diarrhea, nausea and vomiting.   Genitourinary: Negative for dysuria and urgency.   Musculoskeletal: Negative for myalgias and neck pain.   Skin: Negative for itching and rash.   Neurological: Negative for sensory change, focal weakness and headaches.   Endo/Heme/Allergies: Negative for polydipsia. Does not bruise/bleed easily.   Psychiatric/Behavioral: Negative for depression and suicidal ideas.     Past Medical History:   Diagnosis Date    Anticoagulant long-term use     Cardiomegaly     Chronic combined systolic and diastolic congestive heart failure     Coronary artery disease     Heart  attack 2006    Hypertension     Hyperthyroidism, subclinical 1/2/2013    MI (myocardial infarction) 9/22/2013    MI in 2009      Paroxysmal atrial fibrillation     PE (pulmonary embolism) 1/1/2013    IN 2010     S/P ablation of atrial flutter 2008    Stroke 2009    no residual weaknesses     Social History     Socioeconomic History    Marital status: Single     Spouse name: Not on file    Number of children: Not on file    Years of education: Not on file    Highest education level: Not on file   Occupational History    Not on file   Social Needs    Financial resource strain: Not on file    Food insecurity:     Worry: Not on file     Inability: Not on file    Transportation needs:     Medical: Not on file     Non-medical: Not on file   Tobacco Use    Smoking status: Never Smoker    Smokeless tobacco: Never Used   Substance and Sexual Activity    Alcohol use: No    Drug use: No    Sexual activity: Never   Lifestyle    Physical activity:     Days per week: Not on file     Minutes per session: Not on file    Stress: Not on file   Relationships    Social connections:     Talks on phone: Not on file     Gets together: Not on file     Attends Scientologist service: Not on file     Active member of club or organization: Not on file     Attends meetings of clubs or organizations: Not on file     Relationship status: Not on file   Other Topics Concern    Not on file   Social History Narrative    Not on file       OBJECTIVE:       Intake/Output Summary (Last 24 hours) at 1/6/2020 0925  Last data filed at 1/6/2020 0725  Gross per 24 hour   Intake 778 ml   Output 525 ml   Net 253 ml     Vital Signs Range (Last 24H):  Temp:  [97.4 °F (36.3 °C)-98.1 °F (36.7 °C)]   Pulse:  []   Resp:  [16-20]   BP: (101-143)/(57-76)   SpO2:  [91 %-97 %]   Body mass index is 41.12 kg/m².    Objective:  General Appearance:  Comfortable, well-appearing, in no acute distress and not in pain.    Vital signs: (most recent):  "Blood pressure 130/60, pulse 94, temperature 97.4 °F (36.3 °C), temperature source Oral, resp. rate 20, height 5' 9" (1.753 m), weight 126.3 kg (278 lb 7.1 oz), SpO2 95 %.  No fever.    Output: Producing urine and producing stool.    HEENT: Normal HEENT exam.  (+JVD)    Lungs:  Normal effort.  Breath sounds clear to auscultation.  He is not in respiratory distress.  There are rales.  No wheezes.    Heart: Normal rate.  Regular rhythm.  S1 normal and S2 normal.  Positive for murmur.    Chest: Symmetric chest wall expansion.   Abdomen: Abdomen is soft.  Bowel sounds are normal.   There is no abdominal tenderness.     Extremities: Normal range of motion.  There is venous stasis and dependent edema.  There is no deformity, effusion or local swelling.    Pulses: Distal pulses are intact.    Neurological: Patient is alert and oriented to person, place and time.  Normal strength.    Pupils:  Pupils are equal, round, and reactive to light.    Skin:  Warm and dry.      Medications:  Medication list was reviewed in EPIC and changes noted under Assessment/Plan and MAR.    Laboratory:  Recent Labs     01/06/20  0209   WBC 9.60   RBC 2.92*   HGB 7.2*   HCT 25.2*      MCV 86   MCH 24.7*   MCHC 28.6*   GRAN 60.1  5.8   LYMPH 8.9*  0.9*   MONO 19.4*  1.9*   EOS 1.0*      Recent Labs     01/06/20  0209   *  136*     140   K 4.3  4.3   CL 93*  93*   CO2 31*  31*   *  100*   CREATININE 3.1*  3.1*   CALCIUM 9.7  9.7   ANIONGAP 16  16   MG 2.5   PHOS 3.9     Prior records reviewed.    ECHO reviewed:  · Severely decreased left ventricular systolic function. The estimated ejection fraction is 25%  · Concentric left ventricular hypertrophy.  · Global hypokinetic wall motion.  · Moderate right ventricular enlargement.  · Moderately reduced right ventricular systolic function.  · Severe biatrial enlargement.  · Moderate mitral regurgitation.  · Moderate tricuspid regurgitation.  · The estimated PA " systolic pressure is 40 mm Hg  · Elevated central venous pressure (15 mm Hg).  · Atrial fibrillation observed.      Imaging reviewed  Imaging Results          CT Chest Without Contrast (No Result on File)                X-Ray Chest AP Portable (Final result)  Result time 12/24/19 19:09:07    Final result by Zeb Hirsch MD (12/24/19 19:09:07)                 Impression:      Masslike opacity overlying the right lower lung zone, similar to prior exam.  Right pleural fluid not excluded.  Chest CT recommended for further evaluation of right lung masslike opacity.    Patchy bilateral pulmonary opacities, similar to prior exam.    Cardiomegaly.    Electronically signed by resident: Cristofer April  Date:    12/24/2019  Time:    18:48    Electronically signed by: Zeb Hirsch MD  Date:    12/24/2019  Time:    19:09             Narrative:    EXAMINATION:  XR CHEST AP PORTABLE    CLINICAL HISTORY:  CHF;    TECHNIQUE:  Single frontal view of the chest was performed.    COMPARISON:  Chest radiograph 12/03/2019    FINDINGS:  Cardiac leads overlie the chest wall.    Redemonstration of extensive bilateral patchy opacities, more prominent on the right with a focal area of masslike opacity in the right lower lung zone.  No significant change from prior exam.  Left costophrenic angle is visible and right sided pleural fluid not excluded noting presence of opacification.  No pneumothorax.    The cardiac silhouette is enlarged.  Hilar and mediastinal contours are unchanged.    Bones are intact.                                  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 [I13.0]  Yes    Elevated alkaline phosphatase  level [R74.8]  Yes    Coronary artery disease [I25.10]  Yes     Chronic    Stage 3 chronic kidney disease [N18.3]  Yes    Subtherapeutic international normalized ratio (INR) [R79.1]  Yes    Personal history of cerebral embolism [Z86.79]  Not Applicable    Personal history of venous thrombosis and embolism [Z86.718]  Not Applicable    Personal history of MI (myocardial infarction) [I25.2]  Not Applicable    Essential hypertension [I10]  Yes     Chronic    Atrial fibrillation, chronic [I48.20]  Yes     Chronic    Chronic anticoagulation [Z79.01]  Not Applicable     Chronic    Cardiomyopathy [I42.9]  Yes     Chronic      Resolved Hospital Problems   No resolved problems to display.      Acute on Chronic Combined Systolic and Diastolic Heart Failure  Cardiomyopathy  · CHF Pathway initiated  · Last 2D echo Nov 2019 with EF 25%  ·  and CXR with pulmonary edema and cardiomegaly  · Started on Lasix gtt.   · Received several doses of IV Diuril for augmentation.   · Cont  gtt 2.5.   · Transition back to lasix gtt  · Add of diuril  · Patient agreeable to palliative care discussion  · Will plan for PICC line for home , likely 1/7  · Goal diuresis 2-3L/day.  Home diuretic dose:  Lasix 80mg PO BID  · BID BMP (with morning labs and at 4pm)  · Strict I&Os with daily weights.  · Hold BB, okay to c/w BiDil     JEAN-PIERRE on CKD3  · Baseline 1.8  · Cardiorenal, improving w/ diuresis and   · Will increase diuretics to effect    Chronic AFib  Long Term Use of Anticoagulants  Subtherapeutic INR  · Chronic issue  · HR in 100-120's  · Continue Warfarin at increased dose  · Pharmacy consulted for dosing management     Essential HTN  · Chronic and stable  · Continue Bidil BID     CAD  History of MI  · Chronic and stable  · Continue Aspirin 81mg PO daily  · Continue Bidil BID     Anemia  · Hgb > 7  · Received 5 units PRBCs on prior hospital admission  · Continue PO iron  · Type and screen obtained  · Monitor for  bleeding/need for transfusion  · Transfused 1U pRBC after episode of epistaxis noted     History of VTE  History of Stroke  · Chronic and stable  · Continue Aspirin 81mg PO daily  · Continue Warfarin as above      Elevated Alk Phos  · Chronic and stable  · Monitor     Benign Hypertensive Heart and Kidney Disease with HF and CKD  · As above     Abnormal CT  · Needs CT chest when able to lie flat or outpt to evaluate masslike opacity seen on CXR     Diet:  Low Sodium, 1.2L fluid restriction  VTE PPx:  Warfarin  Goals of Care:  Full     High Risk Conditions:   Patient is currently on drug therapy requiring intensive monitoring for toxicity: Lasix gtt    Anticipated discharge date and disposition:   Pending diuresis; plan for discharge home with hospice care.  Markus Howard MD  [facility]

## 2020-01-06 NOTE — PT/OT/SLP PROGRESS
"Occupational Therapy   Treatment    Name: Hamlet Terrell  MRN: [id number]  Admitting Diagnosis:  Acute on chronic combined systolic and diastolic congestive heart failure       Recommendations:     Discharge Recommendations: home with home health  Discharge Equipment Recommendations:  (TBD)  Barriers to discharge:  Decreased caregiver support    Assessment:     Hamlet Terrell is a 54 y.o. male with a medical diagnosis of Acute on chronic combined systolic and diastolic congestive heart failure. He presents with the following performance deficits affecting function: weakness, pain, impaired endurance, impaired cardiopulmonary response to activity, impaired self care skills, impaired functional mobilty. Patient's performance in session limited by fatigue/SOB and c/o 10/10 pain in B hips. Patient tolerated standing 5 min 30 sec and reported pain initially eased when standing but gradually worsened with time. Patient expressed concern about discharging home with the amount of pain he is experiencing, especially since he does not have any assistance at home. At this time, patient will continue to benefit from acute skilled therapy intervention to address deficits/underlying impairments and progress towards prior level of function. After discharge, patient will benefit from receiving home health therapy services to ensure safety and independence in the home environment.    Rehab Prognosis:  Good; patient would benefit from acute skilled OT services to address these deficits and reach maximum level of function.       Plan:     Patient to be seen 3 x/week to address the above listed problems via self-care/home management, therapeutic activities, therapeutic exercises  · Plan of Care Expires: 01/29/20  · Plan of Care Reviewed with: patient    Subjective     Patient stated "All of today is messed up. I want to walk with you but I can't".    Pain/Comfort:  · Pain Rating 1: 10/10  · Location - Side 1: Bilateral  · Location 1: " hip  · Pain Addressed 1: Pre-medicate for activity, Reposition, Distraction    Objective:     Communicated with: RN prior to session. Patient found up in chair with peripheral IV, telemetry upon OT entry to room.    General Precautions: Standard, fall   Orthopedic Precautions:N/A   Braces: N/A     Occupational Performance:     Bed Mobility:    · Not assessed; patient up in chair at start of session and returned to chair at end of session    Functional Mobility/Transfers:\  · Patient completed scooting hips in chair with independence   · Patient completed Sit <> Stand Transfer with stand by assistance  with no assistive device   · Functional Mobility: Patient took ~5 steps using no AD with SBA. Patient declined further ambulation at this time 2* pain.  · Patient tolerated standing 5 min 30 sec with supervision to independence.     Activities of Daily Living:  · Feeding: independence; patient reported it requires extended time 2* tremors  · Therapist did not notice tremors when in patient's room briefly during lunch  · Toileting: independence using urinal in standing    [facility] 6 Click ADL:      Treatment & Education:   Therapist provided facilitation and instruction of proper body mechanics, energy conservation, and fall prevention strategies during tasks listed above.   Patient performed 1x10 B UE shoulder flexion AROM and 1x20 B UE alternating forward punches AROM to improve strength and activity tolerance.   Cues provided for breathing techniques during exercises 2* SOB   Instructed patient to perform AROM 3x/day as able.   Instructed patient to use call light to have nursing staff assist with transfers.    Educated patient on OT POC and answered all questions within OT scope of practice.    Patient left up in chair with all lines intact and call button in reachEducation:      GOALS:   Multidisciplinary Problems     Occupational Therapy Goals        Problem: Occupational Therapy Goal    Goal Priority  Disciplines Outcome Interventions   Occupational Therapy Goal     OT, PT/OT Ongoing, Progressing    Description:  Goals to be met by 1/13/20:    Patient will increase functional independence with ADLs by performing:    LE Dressing with Supervision.  Grooming while standing with Supervision.  Toileting from toilet with Supervision for hygiene and clothing management.   Toilet transfer to toilet with Supervision.                      Time Tracking:     OT Date of Treatment: 01/06/20  OT Start Time: 1452  OT Stop Time: 1516  OT Total Time (min): 24 min    Billable Minutes:Therapeutic Activity 14  Therapeutic Exercise 10    Pat Christianson OT  1/6/2020

## 2020-01-07 PROBLEM — I13.10 CARDIORENAL SYNDROME: Status: ACTIVE | Noted: 2020-01-07

## 2020-01-07 LAB
ALBUMIN SERPL BCP-MCNC: 3.2 G/DL (ref 3.5–5.2)
ALP SERPL-CCNC: 226 U/L (ref 55–135)
ALT SERPL W/O P-5'-P-CCNC: 14 U/L (ref 10–44)
ANION GAP SERPL CALC-SCNC: 12 MMOL/L (ref 8–16)
AST SERPL-CCNC: 24 U/L (ref 10–40)
BASOPHILS # BLD AUTO: 0.03 K/UL (ref 0–0.2)
BASOPHILS NFR BLD: 0.3 % (ref 0–1.9)
BILIRUB SERPL-MCNC: 1.5 MG/DL (ref 0.1–1)
BUN SERPL-MCNC: 104 MG/DL (ref 6–20)
CALCIUM SERPL-MCNC: 9.6 MG/DL (ref 8.7–10.5)
CHLORIDE SERPL-SCNC: 91 MMOL/L (ref 95–110)
CO2 SERPL-SCNC: 34 MMOL/L (ref 23–29)
CREAT SERPL-MCNC: 3.1 MG/DL (ref 0.5–1.4)
DIFFERENTIAL METHOD: ABNORMAL
EOSINOPHIL # BLD AUTO: 1 K/UL (ref 0–0.5)
EOSINOPHIL NFR BLD: 10.9 % (ref 0–8)
ERYTHROCYTE [DISTWIDTH] IN BLOOD BY AUTOMATED COUNT: 22.4 % (ref 11.5–14.5)
EST. GFR  (AFRICAN AMERICAN): 25.2 ML/MIN/1.73 M^2
EST. GFR  (NON AFRICAN AMERICAN): 21.8 ML/MIN/1.73 M^2
GLUCOSE SERPL-MCNC: 102 MG/DL (ref 70–110)
HCT VFR BLD AUTO: 23.9 % (ref 40–54)
HGB BLD-MCNC: 6.9 G/DL (ref 14–18)
IMM GRANULOCYTES # BLD AUTO: 0.05 K/UL (ref 0–0.04)
IMM GRANULOCYTES NFR BLD AUTO: 0.6 % (ref 0–0.5)
INR PPP: 2.8 (ref 0.8–1.2)
LYMPHOCYTES # BLD AUTO: 0.7 K/UL (ref 1–4.8)
LYMPHOCYTES NFR BLD: 7.8 % (ref 18–48)
MAGNESIUM SERPL-MCNC: 2.5 MG/DL (ref 1.6–2.6)
MCH RBC QN AUTO: 24.8 PG (ref 27–31)
MCHC RBC AUTO-ENTMCNC: 28.9 G/DL (ref 32–36)
MCV RBC AUTO: 86 FL (ref 82–98)
MONOCYTES # BLD AUTO: 1.7 K/UL (ref 0.3–1)
MONOCYTES NFR BLD: 19.7 % (ref 4–15)
NEUTROPHILS # BLD AUTO: 5.4 K/UL (ref 1.8–7.7)
NEUTROPHILS NFR BLD: 60.7 % (ref 38–73)
NRBC BLD-RTO: 0 /100 WBC
PHOSPHATE SERPL-MCNC: 4.1 MG/DL (ref 2.7–4.5)
PLATELET # BLD AUTO: 238 K/UL (ref 150–350)
PMV BLD AUTO: 10 FL (ref 9.2–12.9)
POTASSIUM SERPL-SCNC: 3.9 MMOL/L (ref 3.5–5.1)
PROT SERPL-MCNC: 8.6 G/DL (ref 6–8.4)
PROTHROMBIN TIME: 26.1 SEC (ref 9–12.5)
RBC # BLD AUTO: 2.78 M/UL (ref 4.6–6.2)
SODIUM SERPL-SCNC: 137 MMOL/L (ref 136–145)
WBC # BLD AUTO: 8.82 K/UL (ref 3.9–12.7)

## 2020-01-07 PROCEDURE — 83735 ASSAY OF MAGNESIUM: CPT

## 2020-01-07 PROCEDURE — 36415 COLL VENOUS BLD VENIPUNCTURE: CPT

## 2020-01-07 PROCEDURE — 85025 COMPLETE CBC W/AUTO DIFF WBC: CPT

## 2020-01-07 PROCEDURE — 63600175 PHARM REV CODE 636 W HCPCS: Performed by: HOSPITALIST

## 2020-01-07 PROCEDURE — 85610 PROTHROMBIN TIME: CPT

## 2020-01-07 PROCEDURE — 99233 PR SUBSEQUENT HOSPITAL CARE,LEVL III: ICD-10-PCS | Mod: ,,, | Performed by: INTERNAL MEDICINE

## 2020-01-07 PROCEDURE — 99233 PR SUBSEQUENT HOSPITAL CARE,LEVL III: ICD-10-PCS | Mod: ,,, | Performed by: NURSE PRACTITIONER

## 2020-01-07 PROCEDURE — 84100 ASSAY OF PHOSPHORUS: CPT

## 2020-01-07 PROCEDURE — 80053 COMPREHEN METABOLIC PANEL: CPT

## 2020-01-07 PROCEDURE — 99233 PR SUBSEQUENT HOSPITAL CARE,LEVL III: ICD-10-PCS | Mod: ,,, | Performed by: HOSPITALIST

## 2020-01-07 PROCEDURE — 99233 SBSQ HOSP IP/OBS HIGH 50: CPT | Mod: ,,, | Performed by: NURSE PRACTITIONER

## 2020-01-07 PROCEDURE — 99233 SBSQ HOSP IP/OBS HIGH 50: CPT | Mod: ,,, | Performed by: INTERNAL MEDICINE

## 2020-01-07 PROCEDURE — 25000003 PHARM REV CODE 250: Performed by: HOSPITALIST

## 2020-01-07 PROCEDURE — 20600001 HC STEP DOWN PRIVATE ROOM

## 2020-01-07 PROCEDURE — 94761 N-INVAS EAR/PLS OXIMETRY MLT: CPT

## 2020-01-07 PROCEDURE — 27000221 HC OXYGEN, UP TO 24 HOURS

## 2020-01-07 PROCEDURE — 99233 SBSQ HOSP IP/OBS HIGH 50: CPT | Mod: ,,, | Performed by: HOSPITALIST

## 2020-01-07 RX ORDER — POTASSIUM CHLORIDE 20 MEQ/15ML
20 SOLUTION ORAL ONCE
Status: COMPLETED | OUTPATIENT
Start: 2020-01-07 | End: 2020-01-07

## 2020-01-07 RX ORDER — FUROSEMIDE 10 MG/ML
80 INJECTION INTRAMUSCULAR; INTRAVENOUS ONCE
Status: COMPLETED | OUTPATIENT
Start: 2020-01-07 | End: 2020-01-07

## 2020-01-07 RX ORDER — DIPHENHYDRAMINE HCL 25 MG
25 CAPSULE ORAL EVERY 6 HOURS PRN
Status: DISCONTINUED | OUTPATIENT
Start: 2020-01-07 | End: 2020-01-18 | Stop reason: HOSPADM

## 2020-01-07 RX ORDER — POTASSIUM CHLORIDE 20 MEQ/1
20 TABLET, EXTENDED RELEASE ORAL ONCE
Status: DISCONTINUED | OUTPATIENT
Start: 2020-01-07 | End: 2020-01-07

## 2020-01-07 RX ORDER — METOLAZONE 2.5 MG/1
10 TABLET ORAL ONCE
Status: COMPLETED | OUTPATIENT
Start: 2020-01-07 | End: 2020-01-07

## 2020-01-07 RX ADMIN — FERROUS SULFATE TAB EC 325 MG (65 MG FE EQUIVALENT) 325 MG: 325 (65 FE) TABLET DELAYED RESPONSE at 09:01

## 2020-01-07 RX ADMIN — ASPIRIN 81 MG: 81 TABLET, COATED ORAL at 09:01

## 2020-01-07 RX ADMIN — CHLOROTHIAZIDE SODIUM 500 MG: 500 INJECTION, POWDER, LYOPHILIZED, FOR SOLUTION INTRAVENOUS at 12:01

## 2020-01-07 RX ADMIN — OXYCODONE HYDROCHLORIDE 15 MG: 10 TABLET ORAL at 12:01

## 2020-01-07 RX ADMIN — FUROSEMIDE 40 MG/HR: 10 INJECTION, SOLUTION INTRAMUSCULAR; INTRAVENOUS at 08:01

## 2020-01-07 RX ADMIN — FUROSEMIDE 30 MG/HR: 10 INJECTION, SOLUTION INTRAMUSCULAR; INTRAVENOUS at 12:01

## 2020-01-07 RX ADMIN — HYDRALAZINE HYDROCHLORIDE AND ISOSORBIDE DINITRATE 1 TABLET: 37.5; 2 TABLET, FILM COATED ORAL at 08:01

## 2020-01-07 RX ADMIN — MORPHINE SULFATE 4 MG: 2 INJECTION, SOLUTION INTRAMUSCULAR; INTRAVENOUS at 08:01

## 2020-01-07 RX ADMIN — FUROSEMIDE 30 MG/HR: 10 INJECTION, SOLUTION INTRAMUSCULAR; INTRAVENOUS at 05:01

## 2020-01-07 RX ADMIN — POTASSIUM CHLORIDE 20 MEQ: 20 SOLUTION ORAL at 03:01

## 2020-01-07 RX ADMIN — WARFARIN SODIUM 7.5 MG: 7.5 TABLET ORAL at 04:01

## 2020-01-07 RX ADMIN — HYDRALAZINE HYDROCHLORIDE AND ISOSORBIDE DINITRATE 1 TABLET: 37.5; 2 TABLET, FILM COATED ORAL at 09:01

## 2020-01-07 RX ADMIN — MORPHINE SULFATE 4 MG: 2 INJECTION, SOLUTION INTRAMUSCULAR; INTRAVENOUS at 09:01

## 2020-01-07 RX ADMIN — METOLAZONE 10 MG: 2.5 TABLET ORAL at 08:01

## 2020-01-07 RX ADMIN — FUROSEMIDE 80 MG: 10 INJECTION, SOLUTION INTRAMUSCULAR; INTRAVENOUS at 12:01

## 2020-01-07 NOTE — HPI
The pt is a 53 yo M with chronic combined HF (EF 25% on 11/27), atrial fibrillation on chronic AC (coumadin), CAD, CVA, and history of PE that presented to the ED with SOB & edema. Nephrology was consulted for assistance with management of cardiorenal syndrome in the setting of JEAN-PIERRE on CKD III now resistive to diuretic management. Baseline Cr appears to be 1.6-1.9. Cr elevated to 3.1 at time of consult. Of note, he was recently admitted to Hospital Medicine from 11/24-12/8 for a CHF exacerbation.  During that admission, he was evaluated by Cardiology who started him on Dobutamine and Diuril.  He was DC'ed home on Lasix 80 mg po BID.  He reports that he was compliant with this regimen, but despite this, he had continued to gain weight, have lower extremity and abdominal swelling, as well as shortness of breath. He was discharged with a weight of 259lbs, and claims that is around his dry weight. He is currently 283 lbs.       Per Dr. Goins:   Patient started on IV diuretics and  infusion for diagnosis of acute on chronic combined heart failure. His weight was stagnant and UOP tapered, so he was transitioned to IVP lasix for IV lasix gtt as this was actually effective during prior admission under guidance from co-management cardiology. However, the patient actually worsened symptomatically and TBili increased along with Cr and weight increase. The patient is now started back on lasix infusion with diuril augmentation. The patient's course is also c/b epistaxis, possibly related to his decompensated picture. He has required pRBC transfusion. He refuses to apply pressure to the bridge of his nose as he says he cannot breathe through his mouth.  The patient's course is also c/b fever, likely due to pRBC transfusion.     Cardiology consulted for diuretic resistance. Despite two-week hospital stay thus far, he is only down 3 lbs. Nephrology consulted for acute renal failure and consideration of temporary HD for volume  removal. Pt continues to exhibit poor insight into his disease. His wishes (to get healthy, ride his bike, walk to the store, return to the hospital if needed) is not on par with hospice. He reported similar feelings to Palliative Care yesterday. Discussed with Palliative Care provider. He is however now amenable for home dobutamine. PICC placement will likely be an issue given his current renal failure. Hb 6.9 secondary to renal failure; pt would like to hold off on transfusion today.

## 2020-01-07 NOTE — PT/OT/SLP PROGRESS
Physical Therapy      Patient Name:  Hamlet Terrell   MRN:  [id number]    Patient not seen today secondary to Patient unwilling to participate, Pain, Other (Comment)(increased fatigue). AM and PM attempts made. Will follow-up 1/8/20.    Uma Negron PTA

## 2020-01-07 NOTE — ASSESSMENT & PLAN NOTE
- JEAN-PIERRE on CKD III 2/2 cardiorenal syndrome   - Creatinine up trending since admission, baseline 1.6-1.9, currently 3.1

## 2020-01-07 NOTE — SUBJECTIVE & OBJECTIVE
Interval History: Patient now less responsive to diuretics, continues to have significant edema and dyspnea. Reports no chest pain or syncope. Weight has been increasing.    Review of Systems   All other systems reviewed and are negative.    Objective:     Vital Signs (Most Recent):  Temp: (!) 95.4 °F (35.2 °C) (01/07/20 1216)  Pulse: (!) 117 (01/07/20 1216)  Resp: 18 (01/07/20 1216)  BP: (!) 106/58 (01/07/20 1216)  SpO2: 96 % (01/07/20 1216) Vital Signs (24h Range):  Temp:  [95.4 °F (35.2 °C)-98.5 °F (36.9 °C)] 95.4 °F (35.2 °C)  Pulse:  [] 117  Resp:  [16-18] 18  SpO2:  [93 %-96 %] 96 %  BP: (106-134)/(54-68) 106/58     Weight: 128.4 kg (283 lb 1.1 oz)  Body mass index is 41.8 kg/m².     SpO2: 96 %  O2 Device (Oxygen Therapy): room air      Intake/Output Summary (Last 24 hours) at 1/7/2020 1327  Last data filed at 1/7/2020 0600  Gross per 24 hour   Intake 751.7 ml   Output 1250 ml   Net -498.3 ml       Lines/Drains/Airways     Peripheral Intravenous Line                 Peripheral IV - Single Lumen 12/27/19 1625 20 G;1 3/4 in Right;Anterior Forearm 10 days         Peripheral IV - Single Lumen 12/27/19 1625 20 G;1 3/4 in Right;Anterior Upper Arm 10 days                Physical Exam   Constitutional: He is oriented to person, place, and time. He appears well-developed and well-nourished. No distress.   HENT:   Head: Normocephalic and atraumatic.   Neck: No JVD present.   Cardiovascular: Normal rate, regular rhythm, normal heart sounds and intact distal pulses. Exam reveals no gallop and no friction rub.   No murmur heard.  Pulmonary/Chest: Effort normal and breath sounds normal. No respiratory distress. He has no wheezes. He has no rales.   Abdominal: Soft. Bowel sounds are normal. He exhibits distension. There is no tenderness.   Musculoskeletal: He exhibits edema (non-pitting edema of both lower extremities).   Neurological: He is alert and oriented to person, place, and time.   Skin: He is not diaphoretic.        Significant Labs:     Recent Results (from the past 24 hour(s))   CBC auto differential    Collection Time: 01/07/20  8:06 AM   Result Value Ref Range    WBC 8.82 3.90 - 12.70 K/uL    RBC 2.78 (L) 4.60 - 6.20 M/uL    Hemoglobin 6.9 (L) 14.0 - 18.0 g/dL    Hematocrit 23.9 (L) 40.0 - 54.0 %    Mean Corpuscular Volume 86 82 - 98 fL    Mean Corpuscular Hemoglobin 24.8 (L) 27.0 - 31.0 pg    Mean Corpuscular Hemoglobin Conc 28.9 (L) 32.0 - 36.0 g/dL    RDW 22.4 (H) 11.5 - 14.5 %    Platelets 238 150 - 350 K/uL    MPV 10.0 9.2 - 12.9 fL    Immature Granulocytes 0.6 (H) 0.0 - 0.5 %    Gran # (ANC) 5.4 1.8 - 7.7 K/uL    Immature Grans (Abs) 0.05 (H) 0.00 - 0.04 K/uL    Lymph # 0.7 (L) 1.0 - 4.8 K/uL    Mono # 1.7 (H) 0.3 - 1.0 K/uL    Eos # 1.0 (H) 0.0 - 0.5 K/uL    Baso # 0.03 0.00 - 0.20 K/uL    nRBC 0 0 /100 WBC    Gran% 60.7 38.0 - 73.0 %    Lymph% 7.8 (L) 18.0 - 48.0 %    Mono% 19.7 (H) 4.0 - 15.0 %    Eosinophil% 10.9 (H) 0.0 - 8.0 %    Basophil% 0.3 0.0 - 1.9 %    Differential Method Automated    Comprehensive metabolic panel    Collection Time: 01/07/20  8:06 AM   Result Value Ref Range    Sodium 137 136 - 145 mmol/L    Potassium 3.9 3.5 - 5.1 mmol/L    Chloride 91 (L) 95 - 110 mmol/L    CO2 34 (H) 23 - 29 mmol/L    Glucose 102 70 - 110 mg/dL    BUN, Bld 104 (H) 6 - 20 mg/dL    Creatinine 3.1 (H) 0.5 - 1.4 mg/dL    Calcium 9.6 8.7 - 10.5 mg/dL    Total Protein 8.6 (H) 6.0 - 8.4 g/dL    Albumin 3.2 (L) 3.5 - 5.2 g/dL    Total Bilirubin 1.5 (H) 0.1 - 1.0 mg/dL    Alkaline Phosphatase 226 (H) 55 - 135 U/L    AST 24 10 - 40 U/L    ALT 14 10 - 44 U/L    Anion Gap 12 8 - 16 mmol/L    eGFR if African American 25.2 (A) >60 mL/min/1.73 m^2    eGFR if non  21.8 (A) >60 mL/min/1.73 m^2   Magnesium    Collection Time: 01/07/20  8:06 AM   Result Value Ref Range    Magnesium 2.5 1.6 - 2.6 mg/dL   Phosphorus    Collection Time: 01/07/20  8:06 AM   Result Value Ref Range    Phosphorus 4.1 2.7 - 4.5 mg/dL    Protime-INR    Collection Time: 01/07/20  8:06 AM   Result Value Ref Range    Prothrombin Time 26.1 (H) 9.0 - 12.5 sec    INR 2.8 (H) 0.8 - 1.2         Significant Imaging:     I have reviewed all pertinent imaging studies

## 2020-01-07 NOTE — PROGRESS NOTES
Progress Note  Hospital Medicine    Provider team: [facility]  Admit Date: 12/24/2019  Encounter Date: 01/07/2020     SUBJECTIVE:     Follow-up Visit for: Acute on chronic combined systolic and diastolic congestive heart failure    HPI (See H&P for complete P,F,SHx):  54M with chronic combined systolic and diastolic heart failure (EF 25%), afib on Coumadin, CAD, CVA, and history of PE who presents to the ED for evaluation of shortness of breath and edema.  He was just admitted to Hospital Medicine from 11/24-12/8 for a CHF exacerbation.  During that admission, he was evaluated by Cardiology who started him on Dobutamine and Diuril.  He was resistant to HTS evaluation during that stay, which included recommendations for LVAD, transplant, and ICD.  He was discharged home on Lasix 80mg PO BID.  He endorses compliance with his diuretic regimen, but despite this and following a low salt diet, he has continued to gain weight, have lower extremity and abdominal swelling, as well as shortness of breath.  He is so short of breath that he has to sleep in a chair, and has been unable to sleep because of his breathing.  He denies any chest pain.  He does have a cough that is mostly dry, but occasionally productive of blood tinged sputum.  He was discharged weight a weight of 259lbs, and claims that is around his dry weight.  He is currently 286lbs.       Upon arrival to the ED, vitals were /83, HR 88, RR 22.  Labs showed Hemoglobin 7.3, INR 1.5, Creatinine 1.8, Alk Phos 278,  with Trop 0.128. CXR showed cardiomegaly and pulmonary edema, along with a masslike opacity. CT chest was attempted to be ordered to evaluate the mass, but he was unable to lie flat to get it evaluated.  He was given Lasix 80mg IV and was admitted to Hospital Medicine for further management.    TTE 11/27/2019  · Severely decreased LVSF.  · The estimated ejection fraction is 25%  · Concentric left ventricular hypertrophy.  · Global  hypokinetic wall motion.  · Moderate right ventricular enlargement.  · Moderately reduced RVSF.  · Severe biatrial enlargement.  · Moderate mitral regurgitation.  · Moderate tricuspid regurgitation.  · The estimated PA systolic pressure is 40 mm Hg  · Elevated central venous pressure (15 mm Hg).  · Atrial fibrillation observed.    Hospital course:  Patient started on IV diuretics and  infusion for diagnosis of acute on chronic combined heart failure. His weight was stagnant and UOP tapered, so he was transitioned to IVP lasix for IV lasix gtt as this was actually effective during prior admission under guidance from co-management cardiology. However, the patient actually worsened symptomatically and TBili increased along with Cr and weight increase. The patient is now started back on lasix infusion with diuril augmentation. The patient's course is also c/b epistaxis, possibly related to his decompensated picture. He has required pRBC transfusion. He refuses to apply pressure to the bridge of his nose as he says he cannot breathe through his mouth.  The patient's course is also c/b fever, likely due to pRBC transfusion.    Interval history:  The patient denies any complaints today other than his usual hip and back pain. Oxycodone increased to 15 mg dosing as 10 mg appears ineffective. Negative only 100 mL in past 24 hours, weight up 5 lbs. Cardiology consulted for diuretic resistance. Despite two-week hospital stay thus far, he is only down 3 lbs. Nephrology consulted for acute renal failure and consideration of temporary HD for volume removal. Pt continues to exhibit poor insight into his disease. His wishes (to get healthy, ride his bike, walk to the store, return to the hospital if needed) is not on par with hospice. He reported similar feelings to Palliative Care yesterday. Discussed with Palliative Care provider. He is however now amenable for home dobutamine. PICC placement will likely be an issue given his  current renal failure. Hb 6.9 secondary to renal failure; pt would like to hold off on transfusion today.    Wt Readings from Last 30 Encounters:   01/07/20 128.4 kg (283 lb 1.1 oz)   12/07/19 117.6 kg (259 lb 4.2 oz)   10/23/19 108.9 kg (240 lb)   09/09/19 108.9 kg (240 lb)   08/04/19 105.2 kg (232 lb)   07/18/19 108.2 kg (238 lb 8.6 oz)   06/20/19 118.9 kg (262 lb 2 oz)   05/21/19 121.1 kg (267 lb)   05/16/19 116.3 kg (256 lb 6.3 oz)   04/20/19 127.5 kg (281 lb 1.4 oz)   01/31/19 113.4 kg (250 lb)   11/04/18 123.1 kg (271 lb 6.2 oz)   08/31/18 119.6 kg (263 lb 10.7 oz)   07/28/18 113.4 kg (250 lb)   07/10/18 120.7 kg (266 lb)   05/03/18 117.9 kg (259 lb 14.8 oz)   03/30/18 117.9 kg (260 lb)   02/21/18 117.9 kg (260 lb)   01/12/18 113.4 kg (250 lb)   12/10/17 117.9 kg (260 lb)   11/11/17 113.9 kg (251 lb)   11/01/17 111.8 kg (246 lb 7.6 oz)   10/26/17 118.5 kg (261 lb 3.2 oz)   08/13/17 117.9 kg (260 lb)   08/12/17 117.9 kg (260 lb)   05/10/17 117.9 kg (260 lb)   04/05/17 108.4 kg (239 lb 1.4 oz)   02/01/17 108.9 kg (240 lb)   12/15/16 119.4 kg (263 lb 3.7 oz)   08/26/16 112.5 kg (248 lb)     Review of Systems:  Review of Systems   Constitutional: Negative for chills and fever.   HENT: Negative for congestion and sore throat.    Eyes: Negative for photophobia, pain and discharge.   Respiratory: Positive for shortness of breath. Negative for cough, hemoptysis and sputum production.    Cardiovascular: Positive for orthopnea, leg swelling and PND. Negative for chest pain and palpitations.   Gastrointestinal: Negative for abdominal pain, diarrhea, nausea and vomiting.   Genitourinary: Negative for dysuria and urgency.   Musculoskeletal: Negative for myalgias and neck pain.   Skin: Negative for itching and rash.   Neurological: Negative for sensory change, focal weakness and headaches.   Endo/Heme/Allergies: Negative for polydipsia. Does not bruise/bleed easily.   Psychiatric/Behavioral: Negative for depression and  suicidal ideas.     Past Medical History:   Diagnosis Date    Anticoagulant long-term use     Cardiomegaly     Chronic combined systolic and diastolic congestive heart failure     Coronary artery disease     Heart attack 2006    Hypertension     Hyperthyroidism, subclinical 1/2/2013    MI (myocardial infarction) 9/22/2013    MI in 2009      Paroxysmal atrial fibrillation     PE (pulmonary embolism) 1/1/2013    IN 2010     S/P ablation of atrial flutter 2008    Stroke 2009    no residual weaknesses     Social History     Socioeconomic History    Marital status: Single     Spouse name: Not on file    Number of children: Not on file    Years of education: Not on file    Highest education level: Not on file   Occupational History    Not on file   Social Needs    Financial resource strain: Not on file    Food insecurity:     Worry: Not on file     Inability: Not on file    Transportation needs:     Medical: Not on file     Non-medical: Not on file   Tobacco Use    Smoking status: Never Smoker    Smokeless tobacco: Never Used   Substance and Sexual Activity    Alcohol use: No    Drug use: No    Sexual activity: Never   Lifestyle    Physical activity:     Days per week: Not on file     Minutes per session: Not on file    Stress: Not on file   Relationships    Social connections:     Talks on phone: Not on file     Gets together: Not on file     Attends Anabaptism service: Not on file     Active member of club or organization: Not on file     Attends meetings of clubs or organizations: Not on file     Relationship status: Not on file   Other Topics Concern    Not on file   Social History Narrative    Not on file       OBJECTIVE:       Intake/Output Summary (Last 24 hours) at 1/7/2020 1403  Last data filed at 1/7/2020 0600  Gross per 24 hour   Intake 751.7 ml   Output 1100 ml   Net -348.3 ml     Vital Signs Range (Last 24H):  Temp:  [95.4 °F (35.2 °C)-98.5 °F (36.9 °C)]   Pulse:  []   Resp:  " [16-18]   BP: (106-134)/(54-68)   SpO2:  [93 %-96 %]   Body mass index is 41.8 kg/m².    Objective:  General Appearance:  Comfortable, well-appearing, in no acute distress and not in pain.    Vital signs: (most recent): Blood pressure (!) 106/58, pulse (!) 117, temperature (!) 95.4 °F (35.2 °C), temperature source Oral, resp. rate 18, height 5' 9" (1.753 m), weight 128.4 kg (283 lb 1.1 oz), SpO2 96 %.  No fever.    Output: Producing urine and producing stool.    HEENT: Normal HEENT exam.  (+JVD)    Lungs:  Normal effort.  Breath sounds clear to auscultation.  He is not in respiratory distress.  There are rales.  No wheezes.    Heart: Normal rate.  Regular rhythm.  S1 normal and S2 normal.  Positive for murmur.    Chest: Symmetric chest wall expansion.   Abdomen: Abdomen is soft.  Bowel sounds are normal.   There is no abdominal tenderness.     Extremities: Normal range of motion.  There is venous stasis and dependent edema.  There is no deformity, effusion or local swelling.    Pulses: Distal pulses are intact.    Neurological: Patient is alert and oriented to person, place and time.  Normal strength.    Pupils:  Pupils are equal, round, and reactive to light.    Skin:  Warm and dry.      Medications:  Medication list was reviewed in EPIC and changes noted under Assessment/Plan and MAR.    Laboratory:  Recent Labs     01/07/20  0806   WBC 8.82   RBC 2.78*   HGB 6.9*   HCT 23.9*      MCV 86   MCH 24.8*   MCHC 28.9*   GRAN 60.7  5.4   LYMPH 7.8*  0.7*   MONO 19.7*  1.7*   EOS 1.0*      Recent Labs     01/07/20  0806         K 3.9   CL 91*   CO2 34*   *   CREATININE 3.1*   CALCIUM 9.6   ANIONGAP 12   MG 2.5   PHOS 4.1     Prior records reviewed.    ECHO reviewed:  · Severely decreased left ventricular systolic function. The estimated ejection fraction is 25%  · Concentric left ventricular hypertrophy.  · Global hypokinetic wall motion.  · Moderate right ventricular " enlargement.  · Moderately reduced right ventricular systolic function.  · Severe biatrial enlargement.  · Moderate mitral regurgitation.  · Moderate tricuspid regurgitation.  · The estimated PA systolic pressure is 40 mm Hg  · Elevated central venous pressure (15 mm Hg).  · Atrial fibrillation observed.      Imaging reviewed  Imaging Results          CT Chest Without Contrast (No Result on File)                X-Ray Chest AP Portable (Final result)  Result time 12/24/19 19:09:07    Final result by Zeb Hirsch MD (12/24/19 19:09:07)                 Impression:      Masslike opacity overlying the right lower lung zone, similar to prior exam.  Right pleural fluid not excluded.  Chest CT recommended for further evaluation of right lung masslike opacity.    Patchy bilateral pulmonary opacities, similar to prior exam.    Cardiomegaly.    Electronically signed by resident: Cristofer Reddy  Date:    12/24/2019  Time:    18:48    Electronically signed by: Zeb Hirsch MD  Date:    12/24/2019  Time:    19:09             Narrative:    EXAMINATION:  XR CHEST AP PORTABLE    CLINICAL HISTORY:  CHF;    TECHNIQUE:  Single frontal view of the chest was performed.    COMPARISON:  Chest radiograph 12/03/2019    FINDINGS:  Cardiac leads overlie the chest wall.    Redemonstration of extensive bilateral patchy opacities, more prominent on the right with a focal area of masslike opacity in the right lower lung zone.  No significant change from prior exam.  Left costophrenic angle is visible and right sided pleural fluid not excluded noting presence of opacification.  No pneumothorax.    The cardiac silhouette is enlarged.  Hilar and mediastinal contours are unchanged.    Bones are intact.                                  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 [I13.0]  Yes    Elevated alkaline phosphatase level [R74.8]  Yes    Coronary artery disease [I25.10]  Yes     Chronic    Stage 3 chronic kidney disease [N18.3]  Yes    Subtherapeutic international normalized ratio (INR) [R79.1]  Yes    Personal history of cerebral embolism [Z86.79]  Not Applicable    Personal history of venous thrombosis and embolism [Z86.718]  Not Applicable    Personal history of MI (myocardial infarction) [I25.2]  Not Applicable    Essential hypertension [I10]  Yes     Chronic    Atrial fibrillation, chronic [I48.20]  Yes     Chronic    Chronic anticoagulation [Z79.01]  Not Applicable     Chronic    Cardiomyopathy [I42.9]  Yes     Chronic      Resolved Hospital Problems   No resolved problems to display.      Acute on Chronic Combined Systolic and Diastolic Heart Failure  Cardiomyopathy  · CHF Pathway initiated  · Last 2D echo Nov 2019 with EF 25%  ·  and CXR with pulmonary edema and cardiomegaly  · Started on Lasix gtt. Failed transition to high dose IV Lasix pushes. Back on Lasix gtt  · IV Diuril for augmentation  · Cont  gtt 2.5, which will be continued at home. Will need long-term IV placement when he nears discharge. PICC placement may be an issue given his renal failure   · Cardiology Co-mgmt consulted. Recommend Nephro consult for temporary HD for volume removal due to diuretic resistance  · Goal diuresis 2-3L/day.  Home diuretic dose:  Lasix 80mg PO BID  · BID BMP (with morning labs and at 4pm)  · Strict I&Os with daily weights.  · Hold BB, okay to c/w BiDil     JEAN-PIERRE on CKD3  · Baseline 1.8  · Cardiorenal. Not improving as patient is not diuresing well  · Nephrology consult as above    Chronic AFib  Long Term Use of Anticoagulants  Subtherapeutic INR  · Chronic issue  · HR in 100-120's  · Continue Warfarin at increased  dose  · Pharmacy consulted for dosing management     Essential HTN  · Chronic and stable  · Continue Bidil BID     CAD  History of MI  · Chronic and stable  · Continue Aspirin 81mg PO daily  · Continue Bidil BID     Anemia  · Hgb > 7  · Received 5 units PRBCs on prior hospital admission  · Continue PO iron  · Type and screen obtained  · Monitor for bleeding/need for transfusion  · Transfused 1U pRBC after episode of epistaxis noted     History of VTE  History of Stroke  · Chronic and stable  · Continue Aspirin 81mg PO daily  · Continue Warfarin as above      Elevated Alk Phos  · Chronic and stable  · Monitor     Benign Hypertensive Heart and Kidney Disease with HF and CKD  · As above     Abnormal CT  · Needs CT chest when able to lie flat or outpt to evaluate masslike opacity seen on CXR     Diet:  Low Sodium, 1.2L fluid restriction  VTE PPx:  Warfarin  Goals of Care:  Full     High Risk Conditions:   Patient is currently on drug therapy requiring intensive monitoring for toxicity: Lasix gtt    Anticipated discharge date and disposition:   Pending diuresis, Nephro recs. Pt w/ very poor insight into disease process and will likely continue to be readmitted to the hospital    Antoinette Goins MD  [facility]

## 2020-01-07 NOTE — ASSESSMENT & PLAN NOTE
Plan/Recommendation:  - Continue current regimen with lasix infusion at 30 mg/hr  - Would add metolazone 10mg qd, may titrate to BID depending on urinary output  - Would recommend continued medical management   - Continue to monitor strict In/outs, daily weights  - Will continue to follow pending discussions between patient and palliative care/hospice

## 2020-01-07 NOTE — PLAN OF CARE
[facility]  Palliative Care   Psychosocial Assessment    Patient Name: Hamlet Terrell  MRN: [id number]  Admission Date: 12/24/2019  Hospital Length of Stay: 14 days  Code Status: Full Code   Attending Provider: Antoinette Goins MD  Palliative Care Provider: Vira Davila NP  Primary Care Physician: Carlin Swift MD  Principal Problem:Acute on chronic combined systolic and diastolic congestive heart failure    Reason for Referral: assistance with clarification of goals of care  Consult Order Date: 1/2/20  Primary CM/SW: Mary Ellen Valencia LMSW    Present during Interview: patient and Pal Care APRN and this SW.      Primary Language:English   Needed: no      Past Medical Situation:   PMH:   Past Medical History:   Diagnosis Date    Anticoagulant long-term use     Cardiomegaly     Chronic combined systolic and diastolic congestive heart failure     Coronary artery disease     Heart attack 2006    Hypertension     Hyperthyroidism, subclinical 1/2/2013    MI (myocardial infarction) 9/22/2013    MI in 2009      Paroxysmal atrial fibrillation     PE (pulmonary embolism) 1/1/2013    IN 2010     S/P ablation of atrial flutter 2008    Stroke 2009    no residual weaknesses     Mental Health/Substance Use History: n/a  Non-traditional Health practices:     Understanding of diagnosis and prognosis: Will benefit from direct communication. Simplicity of information given would be helpful for his decision-making.  Experience/Comfort level with health care system:    Patients Mental Status: alert and oriented    Socio-Economic Factors/Resources:  Address: [address] [place] [address]  Phone Number: [phone] (home)     Marital Status: Single  Household Composition: Lives alone  Children: none  Relationships with Family:  Pt has never been  and has no children. Pt has 5 siblings, extended cousins, nieces and nephews. Pt has two nephews that live close by : Lalit and  Hugo that can help him out if needed. Pt has his brother Kris whom his emergency contact is and who he feels the closest with.    Emergency Contacts:   Brother: Kris Blodrake: [phone]    Activities of Daily Living: independent prior  Support Systems-Family & Community (Home Health, HME etc): [facility] in past- discharged  for noncompliance per notes    Transportation:  no    Work/Education History: Pt is disabled.    History: no    Financial Resources:Medicaid      Advanced Care Planning & Legal Concerns:   Advanced Directives/Living Will: no   Planning:  no    Power of : no  Surrogate Decision Maker: brothers      Spirituality, Culture & Coping Mechanisms:  F- Melina and Belief: No Nondenominational     I - Importance: will follow up    C - Community/Culture Values:     A - Address in Care: tbd      Strengths/Coping Strategies: nephews supportive  Self-Care Activities/Hobbies: enjoys outdoors, working on cars, sitting in his truck, playing computer games    Goals/Hopes/Expectations: Wants to be as independent as possible  Fears/Anxiety/Concerns: Does not want to be tied to anything        Preferences about EOL Environment: (own bed, family nearby, pets, music, etc)  home    Complicated Bereavement Risk Assessment Tool (CBRAT)  Reference:  [facility] Clinical Practice Group (May 2016). Bereavement Risk Screening and Management Guidelines.  Retrieved from: http://www.[email or web address].au/wp-content/uploads//GXNOR-Psxntdhbkgj-Pfviptspn-and-Management-Guideline-2016.pdf      Bereaved Client Characteristics   ? Under 18      no  ? Was a Twin   no  ? Young Spouse   no  ? Elderly Spouse    no  ? Isolated    no  ? Lacks Meaningful Social Support   yes  ? Dissatisfied with help available during illness   no  ? New to [employer] [place] no  ? New to Decision-Making   no    Illness  ? Inherited Disorder   no  ? Stigmatized Disease in the  "family/community   no  ? Lengthy/Burdensome   no     Bereaved Client's History of Loss   ? Cumulative Multiple Losses   no  ? Previous Mental Health Illnesses   no  ? Current Mental Health Illness   no  ? Other Significant Health Issues   no   ? Migrant/Refugee   no Death  ? Sudden or Unexpected   no  ? Traumatic Circumstances Associated with Death   no  ? Significant Cultural/Social Burdens as a result of Death   no   Relationship with   ? Profound Lifelong Partner   no  ? Highly Dependent    no  ? Antagonistic   no  ? Ambivalent   no  ? Deeply Connected   no  ? Culturally Defined   no   Risk Factors Scores  0-2  Low  3-5  Moderate  5+  High  All persons scoring moderate to high presume to be at risk**    (** It is acknowledged that protective factors and resilience may outweigh apparent risk factors.      Total Risk Factors Score:   Mild to Moderate    Will benefit from follow up.      Discharge Planning Needs/Plan of Care:     Visit to bedside with [facility] NP Vira Davila. Pt sitting up in chair. Pt alert and oriented. He verbalized frustrations regarding his IV bleeding. Pt stated that he has requested to change it and doesn't understand why he doesn't have a PICC yet (pt had been refusing this prior).     Pt admitted that he will have to go home with IV  and stated that he was discharged from his previous home health for "not standing on the scale." Pt stated that that was the only reason that he was discharged and felt that it was only one person that didn't like him.     Spoke with pt about options for discharge: Home Health vs Home Health with Palliative Care (if able to get agency to agree as pt has Medicaid) vs Hospice. Pt stated that he wanted to go with "your company" pointing to this SW and SHAHRAM. Explained that we work for [facility] and reiterated the options.    Nephrology team with Dr. Vigil came into room while we were there. MD stated that pt's kidneys weren't working well and would " offer dialysis to him. After he left room, SW asked pt's thoughts on dialysis. Pt stated that he would not want dialysis long term but would like to try it while he is in the hospital to see if it will make things work.  Pt stated that his brother is on dialysis and has to sit long hours in a chair three times a week. SW asked if he would still not want dialysis even if the Dr said he would die without it. Pt stated that he still would not want it long term. Pt likes his independence.    Pt stated that he wants to be independent as possible and not want to come back and forth to hospital. Pt stated that he wants his pain controled and symptoms controlled.    [facility] reps entered room while Pal Care present. Introductions made to [facility] who will be following pt while in hospital.      Pt stated that he appreciated the visit and the direct communication. Pal Care SW will continue to follow and provide support to pt.    Informed primary SW Mary Ellen of visit with pt. Explained that pt unsure of options as multiple ones given. Best option would be hospice    Will continue to follow.             Brooke Apple, RICHARDSONW, ACHP-SW

## 2020-01-07 NOTE — SUBJECTIVE & OBJECTIVE
Past Medical History:   Diagnosis Date    Anticoagulant long-term use     Cardiomegaly     Chronic combined systolic and diastolic congestive heart failure     Coronary artery disease     Heart attack 2006    Hypertension     Hyperthyroidism, subclinical 1/2/2013    MI (myocardial infarction) 9/22/2013    MI in 2009      Paroxysmal atrial fibrillation     PE (pulmonary embolism) 1/1/2013    IN 2010     S/P ablation of atrial flutter 2008    Stroke 2009    no residual weaknesses       Past Surgical History:   Procedure Laterality Date    COLONOSCOPY N/A 12/2/2019    Procedure: COLONOSCOPY;  Surgeon: Scott Rosario MD;  [facility]  Service: Endoscopy;  Laterality: N/A;    ESOPHAGOGASTRODUODENOSCOPY N/A 12/2/2019    Procedure: EGD (ESOPHAGOGASTRODUODENOSCOPY);  Surgeon: Scott Rosario MD;  [facility]  Service: Endoscopy;  Laterality: N/A;    RADIOFREQUENCY ABLATION  01/08/2008    for atrial flutter       Review of patient's allergies indicates:   Allergen Reactions    Acetaminophen      Itching    Oxycodone-acetaminophen      Other reaction(s): Itching    Ace inhibitors Other (See Comments)     cough     Current Facility-Administered Medications   Medication Frequency    0.9%  NaCl infusion (for blood administration) Q24H PRN    acetaminophen tablet 650 mg Q4H PRN    aspirin EC tablet 81 mg Daily    dextrose 10% (D10W) Bolus PRN    dextrose 10% (D10W) Bolus PRN    DOBUTamine 500mg in D5W 250mL infusion (premix) (NON-TITRATING) Continuous    ferrous sulfate EC tablet 325 mg Daily    furosemide (LASIX) 2 mg/mL in sodium chloride 0.9% 100 mL infusion (conc: 2 mg/mL) Continuous    glucagon (human recombinant) injection 1 mg PRN    glucose chewable tablet 16 g PRN    glucose chewable tablet 24 g PRN    isosorbide-hydrALAZINE 20-37.5 mg per tablet 1 tablet BID    lidocaine 5 % patch 1 patch Q24H    melatonin tablet 6 mg Nightly PRN     methocarbamol tablet 500 mg TID PRN    methyl salicylate-menthol 15-10% cream TID    morphine injection 4 mg Q6H PRN    ondansetron injection 8 mg Q8H PRN    oxyCODONE immediate release tablet 15 mg Q4H PRN    promethazine (PHENERGAN) 25 mg in dextrose 5 % 50 mL IVPB Q6H PRN    senna-docusate 8.6-50 mg per tablet 1 tablet BID PRN    sodium chloride 0.65 % nasal spray 1 spray PRN    sodium chloride 0.9% flush 10 mL PRN    sodium chloride 0.9% flush 5 mL PRN    warfarin (COUMADIN) tablet 7.5 mg Daily     Family History     Problem Relation (Age of Onset)    Alcohol abuse Father    Hypertension Mother, Father, Sister, Brother    Stroke Mother        Tobacco Use    Smoking status: Never Smoker    Smokeless tobacco: Never Used   Substance and Sexual Activity    Alcohol use: No    Drug use: No    Sexual activity: Never     Review of Systems   Constitutional: Positive for chills. Negative for fever.   HENT: Negative for congestion and rhinorrhea.    Eyes: Negative for photophobia and visual disturbance.   Respiratory: Positive for shortness of breath.    Cardiovascular: Positive for leg swelling. Negative for chest pain and palpitations.   Gastrointestinal: Negative for nausea and vomiting.   Genitourinary: Positive for difficulty urinating (reduced). Negative for dysuria, frequency and urgency.   Musculoskeletal: Positive for back pain.   Skin: Positive for rash (lower extremities, chronic venous stasis). Negative for pallor.   Neurological: Negative for dizziness and headaches.   Psychiatric/Behavioral: Negative for agitation and behavioral problems.     Objective:     Vital Signs (Most Recent):  Temp: 98.2 °F (36.8 °C) (01/07/20 1216)  Pulse: 89 (01/07/20 1546)  Resp: 18 (01/07/20 1546)  BP: (!) 106/58 (01/07/20 1216)  SpO2: (!) 90 % (01/07/20 1546)  O2 Device (Oxygen Therapy): room air (01/07/20 1546) Vital Signs (24h Range):  Temp:  [97.9 °F (36.6 °C)-98.5 °F (36.9 °C)] 98.2 °F (36.8 °C)  Pulse:   [] 89  Resp:  [16-18] 18  SpO2:  [90 %-96 %] 90 %  BP: (106-134)/(54-68) 106/58     Weight: 128.4 kg (283 lb 1.1 oz) (01/07/20 0500)  Body mass index is 41.8 kg/m².  Body surface area is 2.5 meters squared.    I/O last 3 completed shifts:  In: 2031.7 [P.O.:1760; I.V.:271.7]  Out: 2075 [Urine:2075]    Physical Exam   Constitutional: He is oriented to person, place, and time. No distress.   Sick appearing male   HENT:   Head: Normocephalic and atraumatic.   Right Ear: External ear normal.   Left Ear: External ear normal.   Nose: Nose normal.   Eyes: Pupils are equal, round, and reactive to light. EOM are normal. No scleral icterus.   Neck: JVD (to the angle of the mandible) present. No tracheal deviation present.   Cardiovascular: Normal rate, regular rhythm and intact distal pulses. Exam reveals gallop.   Pulses:       Radial pulses are 2+ on the right side, and 2+ on the left side.   Pulmonary/Chest: Effort normal. No stridor. He has no wheezes. He has rales (bibasilar).   Abdominal: Soft. There is no tenderness. There is no guarding.   Musculoskeletal: He exhibits edema and tenderness.   Neurological: He is alert and oriented to person, place, and time.   Skin: Skin is warm and dry. Rash (chronic venous stasis ulceration to the lower extremities bilaterally) noted. He is not diaphoretic.   Psychiatric: He has a normal mood and affect. His behavior is normal.   Nursing note and vitals reviewed.      Significant Labs:  All labs within the past 24 hours have been reviewed.

## 2020-01-07 NOTE — ASSESSMENT & PLAN NOTE
"Palliative care consulted for goals of care discussion/advanced care planning for this 53 y/o male being followed by hospital medicine for Acute on Chronic Combined Systolic and Diastolic Heart Failure, Cardiomyopathy, JEAN-PIERRE on CKD3, Chronic AFib, Essential HTN, CAD, Anemia, Elevated Alk Phos, Abnormal CT, History of MI, History of VTE and History of Stroke. Rounded on patient today, no family members at bedside. Patient is alert, oriented, and coperative with visit. He complains of bilateral hip pain rated 10/10 that improves with Morphine. He expresses frustration with being in the hospital and has been intermittently refusing IV sticks, lab draws, and vital signs. He would be willing to have a PICC line placed if it means he will not need to have so many needle sticks in the future.    Plan/Recommendations:  1. See goals of care discussion below.  2. Palliative care NP will follow up with patient; will request assistance from palliative care .    Goals of Care/Advance Care Plannin/6 Rounded on patient today, no family members at bedside. Patient is alert, oriented, and cooperative with visit. He expresses frustration with being in the hospital and has been intermittently refusing IV sticks, lab draws, and vital signs. Reminded patient that he has the option to enroll in hospice if he prefers to discontinue aggressive treatment and focus on comfort. Patient met with [facility] rep last week but has not yet made any decisions; he says that he is still thinking about it. Discussed the plan for patient to discharge on  drip but patient now says that he does not want to be on a drip when he gets discharged because he is not "toting no bag around." Patient has been told by primary team that he will likely need  drip for the rest of his life but he states that he is going to "prove them wrong." He says that he was told at age 42 that he would die without a defibrillator and he is still here " "so he has "proved them wrong" before. He also says that he would like to go to the rehab hospital down the street when he leaves here so he can get stronger. His goal remains to "get healthy" and he wishes to remain full code. He has poor understanding and insight related to his clinical condition and prognosis. Will involve palliative care  for assistance with emotional support and coping skills.    1/3 Conference conducted by this APRN with patient to discuss his current clinical status, goals of care, treatment options, code status, long term expected outcomes and prognostic awareness. After a discussion concerning his values and wishes, patient verbalized limited understanding and insight related to his clinical condition and prognostic awareness. His stated goal is to "get healthy." He has been told that he will need to be on  gtt for the rest of his life but still hopes that he can live a long time. He has never thought about his end of life preferences and whether he would prefer to die at home or at the hospital. Discussed home hospice as an option that would allow him to receive care in the home and provide management of symptoms. Patient is not ready to enroll in hospice at this time but is interested in meeting with a hospice rep to get more information on their services.    Patient has never completed a living will or MPOA. His preference for surrogate decision maker would be his brother Kris [place]. Explained to patient that palliative care can assist with completion of MPOA paperwork whenever he is ready. He is currently a full code and not ready to make any changes to his code status.  "

## 2020-01-07 NOTE — PROGRESS NOTES
[facility]  Cardiology  Progress Note    Patient Name: Hamlet Terrell  MRN: [id number]  Admission Date: 12/24/2019  Hospital Length of Stay: 14 days  Code Status: Full Code   Attending Physician: Antoinette Goins MD   Primary Care Physician: Carlin Swift MD  Expected Discharge Date: 1/6/2020  Principal Problem:Acute on chronic combined systolic and diastolic congestive heart failure    Subjective:     Interval History: Patient now less responsive to diuretics, continues to have significant edema and dyspnea. Reports no chest pain or syncope. Weight has been increasing.    Review of Systems   All other systems reviewed and are negative.    Objective:     Vital Signs (Most Recent):  Temp: (!) 95.4 °F (35.2 °C) (01/07/20 1216)  Pulse: (!) 117 (01/07/20 1216)  Resp: 18 (01/07/20 1216)  BP: (!) 106/58 (01/07/20 1216)  SpO2: 96 % (01/07/20 1216) Vital Signs (24h Range):  Temp:  [95.4 °F (35.2 °C)-98.5 °F (36.9 °C)] 95.4 °F (35.2 °C)  Pulse:  [] 117  Resp:  [16-18] 18  SpO2:  [93 %-96 %] 96 %  BP: (106-134)/(54-68) 106/58     Weight: 128.4 kg (283 lb 1.1 oz)  Body mass index is 41.8 kg/m².     SpO2: 96 %  O2 Device (Oxygen Therapy): room air      Intake/Output Summary (Last 24 hours) at 1/7/2020 1327  Last data filed at 1/7/2020 0600  Gross per 24 hour   Intake 751.7 ml   Output 1250 ml   Net -498.3 ml       Lines/Drains/Airways     Peripheral Intravenous Line                 Peripheral IV - Single Lumen 12/27/19 1625 20 G;1 3/4 in Right;Anterior Forearm 10 days         Peripheral IV - Single Lumen 12/27/19 1625 20 G;1 3/4 in Right;Anterior Upper Arm 10 days                Physical Exam   Constitutional: He is oriented to person, place, and time. He appears well-developed and well-nourished. No distress.   HENT:   Head: Normocephalic and atraumatic.   Neck: No JVD present.   Cardiovascular: Normal rate, regular rhythm, normal heart sounds and intact distal pulses. Exam reveals no gallop and no  friction rub.   No murmur heard.  Pulmonary/Chest: Effort normal and breath sounds normal. No respiratory distress. He has no wheezes. He has no rales.   Abdominal: Soft. Bowel sounds are normal. He exhibits distension. There is no tenderness.   Musculoskeletal: He exhibits edema (non-pitting edema of both lower extremities).   Neurological: He is alert and oriented to person, place, and time.   Skin: He is not diaphoretic.       Significant Labs:     Recent Results (from the past 24 hour(s))   CBC auto differential    Collection Time: 01/07/20  8:06 AM   Result Value Ref Range    WBC 8.82 3.90 - 12.70 K/uL    RBC 2.78 (L) 4.60 - 6.20 M/uL    Hemoglobin 6.9 (L) 14.0 - 18.0 g/dL    Hematocrit 23.9 (L) 40.0 - 54.0 %    Mean Corpuscular Volume 86 82 - 98 fL    Mean Corpuscular Hemoglobin 24.8 (L) 27.0 - 31.0 pg    Mean Corpuscular Hemoglobin Conc 28.9 (L) 32.0 - 36.0 g/dL    RDW 22.4 (H) 11.5 - 14.5 %    Platelets 238 150 - 350 K/uL    MPV 10.0 9.2 - 12.9 fL    Immature Granulocytes 0.6 (H) 0.0 - 0.5 %    Gran # (ANC) 5.4 1.8 - 7.7 K/uL    Immature Grans (Abs) 0.05 (H) 0.00 - 0.04 K/uL    Lymph # 0.7 (L) 1.0 - 4.8 K/uL    Mono # 1.7 (H) 0.3 - 1.0 K/uL    Eos # 1.0 (H) 0.0 - 0.5 K/uL    Baso # 0.03 0.00 - 0.20 K/uL    nRBC 0 0 /100 WBC    Gran% 60.7 38.0 - 73.0 %    Lymph% 7.8 (L) 18.0 - 48.0 %    Mono% 19.7 (H) 4.0 - 15.0 %    Eosinophil% 10.9 (H) 0.0 - 8.0 %    Basophil% 0.3 0.0 - 1.9 %    Differential Method Automated    Comprehensive metabolic panel    Collection Time: 01/07/20  8:06 AM   Result Value Ref Range    Sodium 137 136 - 145 mmol/L    Potassium 3.9 3.5 - 5.1 mmol/L    Chloride 91 (L) 95 - 110 mmol/L    CO2 34 (H) 23 - 29 mmol/L    Glucose 102 70 - 110 mg/dL    BUN, Bld 104 (H) 6 - 20 mg/dL    Creatinine 3.1 (H) 0.5 - 1.4 mg/dL    Calcium 9.6 8.7 - 10.5 mg/dL    Total Protein 8.6 (H) 6.0 - 8.4 g/dL    Albumin 3.2 (L) 3.5 - 5.2 g/dL    Total Bilirubin 1.5 (H) 0.1 - 1.0 mg/dL    Alkaline Phosphatase 226 (H)  55 - 135 U/L    AST 24 10 - 40 U/L    ALT 14 10 - 44 U/L    Anion Gap 12 8 - 16 mmol/L    eGFR if African American 25.2 (A) >60 mL/min/1.73 m^2    eGFR if non  21.8 (A) >60 mL/min/1.73 m^2   Magnesium    Collection Time: 01/07/20  8:06 AM   Result Value Ref Range    Magnesium 2.5 1.6 - 2.6 mg/dL   Phosphorus    Collection Time: 01/07/20  8:06 AM   Result Value Ref Range    Phosphorus 4.1 2.7 - 4.5 mg/dL   Protime-INR    Collection Time: 01/07/20  8:06 AM   Result Value Ref Range    Prothrombin Time 26.1 (H) 9.0 - 12.5 sec    INR 2.8 (H) 0.8 - 1.2         Significant Imaging:     I have reviewed all pertinent imaging studies    Assessment and Plan:       * Acute on chronic combined systolic and diastolic congestive heart failure  EF 25%  He is currently on  2.5 mcg/kg/min, lasix 20 mg/hr and diuril 500 mg IV daily  He has been on lasix 120 mg IV TID with 500 mg IV diuril as well and currently is in the CKD IV range with GFR 25, thiazides will be less effective in this range of renal dysfunction and Mr. Terrell may now be resistant to diuretics. Recommend nephrology consultation for temporary hemodialysis for volume removal.  Continue Bidil        VTE Risk Mitigation (From admission, onward)         Ordered     warfarin (COUMADIN) tablet 7.5 mg  Daily      01/02/20 0916                Robb Singh MD  Cardiology  [facility]

## 2020-01-07 NOTE — SUBJECTIVE & OBJECTIVE
Interval History: Patient has remained on  gtt at 2.5 mcg/kg since admission. He initially received several doses of IV Diuril but diuresis was limited. He responded well after Lasix gtt was increased to 30 mg/hr. Lasix gtt was stopped and patient was started on Lasix IVP on 1/3 but he began to worsen symptomatically. He received IV Diuril on 1/4 and was started back on Lasix gtt on 1/5. He had an episode of epitaxis on 1/4 and required 1 unit PRBC transfusion after Hgb dropped to 6.8.    Past Medical History:   Diagnosis Date    Anticoagulant long-term use     Cardiomegaly     Chronic combined systolic and diastolic congestive heart failure     Coronary artery disease     Heart attack 2006    Hypertension     Hyperthyroidism, subclinical 1/2/2013    MI (myocardial infarction) 9/22/2013    MI in 2009      Paroxysmal atrial fibrillation     PE (pulmonary embolism) 1/1/2013    IN 2010     S/P ablation of atrial flutter 2008    Stroke 2009    no residual weaknesses       Past Surgical History:   Procedure Laterality Date    COLONOSCOPY N/A 12/2/2019    Procedure: COLONOSCOPY;  Surgeon: Scott Rosario MD;  Location: [facility] ([address] [place] [address]);  Service: Endoscopy;  Laterality: N/A;    ESOPHAGOGASTRODUODENOSCOPY N/A 12/2/2019    Procedure: EGD (ESOPHAGOGASTRODUODENOSCOPY);  Surgeon: Scott Rosario MD;  Location: [facility] ([address] [place] [address]);  Service: Endoscopy;  Laterality: N/A;    RADIOFREQUENCY ABLATION  01/08/2008    for atrial flutter       Review of patient's allergies indicates:   Allergen Reactions    Acetaminophen      Itching    Oxycodone-acetaminophen      Other reaction(s): Itching    Ace inhibitors Other (See Comments)     cough       Medications:  Continuous Infusions:   DOBUTamine 2.5 mcg/kg/min (01/05/20 5104)    furosemide (LASIX) 2 mg/mL infusion (non-titrating) 30 mg/hr (01/06/20 2562)     Scheduled Meds:   aspirin  81 mg Oral Daily    ferrous sulfate  325 mg Oral Daily     isosorbide-hydrALAZINE 20-37.5 mg  1 tablet Oral BID    lidocaine  1 patch Transdermal Q24H    methyl salicylate-menthol 15-10%   Topical (Top) TID    warfarin  7.5 mg Oral Daily     PRN Meds:sodium chloride, acetaminophen, Dextrose 10% Bolus, Dextrose 10% Bolus, glucagon (human recombinant), glucose, glucose, melatonin, methocarbamol, morphine, ondansetron, oxyCODONE, promethazine (PHENERGAN) IVPB, senna-docusate 8.6-50 mg, sodium chloride, sodium chloride 0.9%, sodium chloride 0.9%    Family History     Problem Relation (Age of Onset)    Alcohol abuse Father    Hypertension Mother, Father, Sister, Brother    Stroke Mother        Tobacco Use    Smoking status: Never Smoker    Smokeless tobacco: Never Used   Substance and Sexual Activity    Alcohol use: No    Drug use: No    Sexual activity: Never       Review of Systems   Constitutional: Negative for appetite change and fatigue.   HENT: Negative for sore throat and trouble swallowing.    Respiratory: Positive for shortness of breath. Negative for cough and wheezing.    Cardiovascular: Positive for leg swelling. Negative for chest pain.   Gastrointestinal: Positive for constipation. Negative for diarrhea, nausea and vomiting.   Genitourinary: Negative for dysuria and hematuria.   Musculoskeletal: Positive for arthralgias (hips) and back pain. Negative for neck pain.   Skin: Negative for rash and wound.   Neurological: Negative for dizziness and light-headedness.   Psychiatric/Behavioral: Negative for confusion and dysphoric mood. The patient is not nervous/anxious.      Objective:     Vital Signs (Most Recent):  Temp: 98.1 °F (36.7 °C) (01/06/20 1557)  Pulse: 108 (01/06/20 1557)  Resp: 18 (01/06/20 1557)  BP: 134/66 (01/06/20 1557)  SpO2: (!) 93 % (01/06/20 1557) Vital Signs (24h Range):  Temp:  [97.4 °F (36.3 °C)-98.1 °F (36.7 °C)] 98.1 °F (36.7 °C)  Pulse:  [] 108  Resp:  [16-20] 18  SpO2:  [93 %-97 %] 93 %  BP: (109-143)/(51-76) 134/66     Weight:  (refuse weight)  Body mass index is 41.12 kg/m².    Review of Symptoms  Symptom Assessment (ESAS 0-10 scale)   ESAS 0 1 2 3 4 5 6 7 8 9 10   Pain           X   Dyspnea  X            Anxiety X             Nausea X             Depression  X             Anorexia X             Fatigue X             Insomnia X             Restlessness  X             Agitation X             CAM / Delirium _X_ --  ___+   Constipation     __ --  _X_+   Diarrhea           _X_ --  ___+  Bowel Management Plan (BMP): Yes    Comments: Senna-Docusate ordered BID PRN    Pain Assessment: Location: bilateral hips and back  Quality: shooting  Quantity: 10/10 in intensity  Aggravating factors: worsens with movement or activity  Alleviating factors: improves with Morphine      OME in 24 hours: 40    Performance Status: 50    Physical Exam   Constitutional: He is oriented to person, place, and time. He appears well-developed and well-nourished.   HENT:   Head: Normocephalic and atraumatic.   Eyes: Conjunctivae and EOM are normal.   Neck: Normal range of motion. Neck supple.   Cardiovascular: Regular rhythm. Tachycardia present.   Pulmonary/Chest: Effort normal. No respiratory distress.   Abdominal: Soft. There is no tenderness.   Musculoskeletal: Normal range of motion. He exhibits edema.   Neurological: He is alert and oriented to person, place, and time.   Skin: Skin is warm and dry.   Psychiatric: He has a normal mood and affect. His behavior is normal. Thought content normal.   Vitals reviewed.      Significant Labs: All pertinent labs within the past 24 hours have been reviewed.  CBC:   Recent Labs   Lab 01/06/20  0209   WBC 9.60   HGB 7.2*   HCT 25.2*   MCV 86        BMP:  Recent Labs   Lab 01/06/20  0209   *  136*     140   K 4.3  4.3   CL 93*  93*   CO2 31*  31*   *  100*   CREATININE 3.1*  3.1*   CALCIUM 9.7  9.7   MG 2.5     LFT:  Lab Results   Component Value Date    AST 26 01/06/2020     (H)  2019    ALKPHOS 247 (H) 2020    BILITOT 1.5 (H) 2020     Albumin:   Albumin   Date Value Ref Range Status   2020 3.3 (L) 3.5 - 5.2 g/dL Final     Protein:   Total Protein   Date Value Ref Range Status   2020 8.9 (H) 6.0 - 8.4 g/dL Final     Lactic acid:   Lab Results   Component Value Date    LACTATE 1.0 2019    LACTATE 1.0 2019       Significant Imaging: I have reviewed all pertinent imaging results/findings within the past 24 hours.    Advance Care Planning   Advanced Directives::  Living Will: No  LaPOST: No  Do Not Resuscitate Status: No  Medical Power of : No. Patient identifies brother Kris Canada ([phone]) as his surrogate decision maker.    Decision-Making Capacity: Patient answered questions       Living Arrangements: Lives alone    Psychosocial/Cultural:  Patient is unmarried and has no children. His parents are . He has 5 siblings and a lot of cousins and friends. He lives alone in [place] but says his brother Kris lives right next to him. 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 He says that his goal is to get healthy. He worries about dying.    Spiritual:     F- Melina and Belief: does not identify with a particular Pentecostalism but prays to God    I - Importance: somewhat important  .  C - Community: does not belong to a Rastafarian    A - Address in Care:  visits

## 2020-01-07 NOTE — PLAN OF CARE
Pt free of falls/trauma/injuries.  Denies c/o SOB, CP, or discomfort.  Generalized skin remains CDI; generalized edema noted.  Pt being diuresed with lasix gtt; diuresing well. X1 dose of diuril doing. Remains on  at 2.5mcg. Wt remains stable.  Electrolytes replaced as ordered. Possible PICC line placement and home with  and hospice. Palliative care consulted. Morphine q6hr for pain management along with 10mg of Oxy q4hr. VSS. Fall bundle in place. POC explained, no questions at this time. Pt tolerating plan of care.

## 2020-01-07 NOTE — ASSESSMENT & PLAN NOTE
EF 25%  He is currently on  2.5 mcg/kg/min, lasix 20 mg/hr and diuril 500 mg IV daily  He has been on lasix 120 mg IV TID with 500 mg IV diuril as well and currently is in the CKD IV range with GFR 25, thiazides will be less effective in this range of renal dysfunction and Mr. Terrell may now be resistant to diuretics. Recommend nephrology consultation for temporary hemodialysis for volume removal.  Continue Bidil

## 2020-01-07 NOTE — PROGRESS NOTES
[facility]  Palliative Medicine  Progress Note    Patient Name: Hamlet Terrell  MRN: [id number]  Admission Date: 2019  Hospital Length of Stay: 13 days  Code Status: Full Code   Attending Provider: Markus Howard MD  Consulting Provider: Vira Davila NP  Primary Care Physician: Carlin Swift MD  Principal Problem:Acute on chronic combined systolic and diastolic congestive heart failure    Patient information was obtained from patient, past medical records and Dr. Howard.      Assessment/Plan:     Palliative care encounter  Palliative care consulted for goals of care discussion/advanced care planning for this 53 y/o male being followed by hospital medicine for Acute on Chronic Combined Systolic and Diastolic Heart Failure, Cardiomyopathy, JEAN-PIERRE on CKD3, Chronic AFib, Essential HTN, CAD, Anemia, Elevated Alk Phos, Abnormal CT, History of MI, History of VTE and History of Stroke. Rounded on patient today, no family members at bedside. Patient is alert, oriented, and coperative with visit. He complains of bilateral hip pain rated 10/10 that improves with Morphine. He expresses frustration with being in the hospital and has been intermittently refusing IV sticks, lab draws, and vital signs. He would be willing to have a PICC line placed if it means he will not need to have so many needle sticks in the future.    Plan/Recommendations:  1. See goals of care discussion below.  2. Palliative care NP will follow up with patient; will request assistance from palliative care .    Goals of Care/Advance Care Plannin/6 Rounded on patient today, no family members at bedside. Patient is alert, oriented, and cooperative with visit. He expresses frustration with being in the hospital and has been intermittently refusing IV sticks, lab draws, and vital signs. Reminded patient that he has the option to enroll in hospice if he prefers to discontinue aggressive treatment and focus on  "comfort. Patient met with [facility] rep last week but has not yet made any decisions; he says that he is still thinking about it. Discussed the plan for patient to discharge on  drip but patient now says that he does not want to be on a drip when he gets discharged because he is not "toting no bag around." Patient has been told by primary team that he will likely need  drip for the rest of his life but he states that he is going to "prove them wrong." He says that he was told at age 42 that he would die without a defibrillator and he is still here so he has "proved them wrong" before. He also says that he would like to go to the rehab hospital down the street when he leaves here so he can get stronger. His goal remains to "get healthy" and he wishes to remain full code. He has poor understanding and insight related to his clinical condition and prognosis. Will involve palliative care  for assistance with emotional support and coping skills.    1/3 Conference conducted by this APRN with patient to discuss his current clinical status, goals of care, treatment options, code status, long term expected outcomes and prognostic awareness. After a discussion concerning his values and wishes, patient verbalized limited understanding and insight related to his clinical condition and prognostic awareness. His stated goal is to "get healthy." He has been told that he will need to be on  gtt for the rest of his life but still hopes that he can live a long time. He has never thought about his end of life preferences and whether he would prefer to die at home or at the hospital. Discussed home hospice as an option that would allow him to receive care in the home and provide management of symptoms. Patient is not ready to enroll in hospice at this time but is interested in meeting with a hospice rep to get more information on their services.    Patient has never completed a living will or MPOA. His " preference for surrogate decision maker would be his brother Kris Canada. Explained to patient that palliative care can assist with completion of MPOA paperwork whenever he is ready. He is currently a full code and not ready to make any changes to his code status.        I will follow-up with patient. Please contact us if you have any additional questions.    Subjective:     Chief Complaint:   Chief Complaint   Patient presents with    Shortness of Breath     reports fluid retention        HPI:   Mr. Terrell is a 53 y/o male with PMHx of chronic combined systolic and diastolic heart failure (EF 25%), afib on Coumadin, CAD, CVA, and history of PE who presented to [facility] ED on 12/24 for evaluation of shortness of breath and edema. He was just admitted to Hospital Medicine from 11/24-12/8 for CHF exacerbation. During that admission, he was evaluated by Cardiology who started him on Dobutamine and Diuril. He was resistant to HTS evaluation during that stay, which included recommendations for LVAD, transplant, and ICD. He was discharged home on Lasix 80mg PO BID. He endorsed compliance with his diuretic regimen, but despite this and following a low salt diet, he continued to have dyspnea, orthopnea, edema and weight gain. He also complained of mostly dry cough with occasional production of blood tinged sputum.     CXR in the ED showed cardiomegaly and pulmonary edema, along with a masslike opacity. CT chest was ordered to evaluate the mass, but he was unable to lie flat to get it evaluated. He was given Lasix 80mg IV and was admitted to Hospital Medicine for further management. He was started on Lasix and  gtt.    Hospital Course:  No notes on file    Interval History: Patient has remained on  gtt at 2.5 mcg/kg since admission. He initially received several doses of IV Diuril but diuresis was limited. He responded well after Lasix gtt was increased to 30 mg/hr. Lasix gtt was stopped and patient was started on  Lasix IVP on 1/3 but he began to worsen symptomatically. He received IV Diuril on 1/4 and was started back on Lasix gtt on 1/5. He had an episode of epitaxis on 1/4 and required 1 unit PRBC transfusion after Hgb dropped to 6.8.    Past Medical History:   Diagnosis Date    Anticoagulant long-term use     Cardiomegaly     Chronic combined systolic and diastolic congestive heart failure     Coronary artery disease     Heart attack 2006    Hypertension     Hyperthyroidism, subclinical 1/2/2013    MI (myocardial infarction) 9/22/2013    MI in 2009      Paroxysmal atrial fibrillation     PE (pulmonary embolism) 1/1/2013    IN 2010     S/P ablation of atrial flutter 2008    Stroke 2009    no residual weaknesses       Past Surgical History:   Procedure Laterality Date    COLONOSCOPY N/A 12/2/2019    Procedure: COLONOSCOPY;  Surgeon: Scott Rosario MD;  [facility]  Service: Endoscopy;  Laterality: N/A;    ESOPHAGOGASTRODUODENOSCOPY N/A 12/2/2019    Procedure: EGD (ESOPHAGOGASTRODUODENOSCOPY);  Surgeon: Scott Rosario MD;  Location: [facility]  Service: Endoscopy;  Laterality: N/A;    RADIOFREQUENCY ABLATION  01/08/2008    for atrial flutter       Review of patient's allergies indicates:   Allergen Reactions    Acetaminophen      Itching    Oxycodone-acetaminophen      Other reaction(s): Itching    Ace inhibitors Other (See Comments)     cough       Medications:  Continuous Infusions:   DOBUTamine 2.5 mcg/kg/min (01/05/20 6694)    furosemide (LASIX) 2 mg/mL infusion (non-titrating) 30 mg/hr (01/06/20 9799)     Scheduled Meds:   aspirin  81 mg Oral Daily    ferrous sulfate  325 mg Oral Daily    isosorbide-hydrALAZINE 20-37.5 mg  1 tablet Oral BID    lidocaine  1 patch Transdermal Q24H    methyl salicylate-menthol 15-10%   Topical (Top) TID    warfarin  7.5 mg Oral Daily     PRN Meds:sodium chloride, acetaminophen, Dextrose 10% Bolus, Dextrose 10% Bolus, glucagon  (human recombinant), glucose, glucose, melatonin, methocarbamol, morphine, ondansetron, oxyCODONE, promethazine (PHENERGAN) IVPB, senna-docusate 8.6-50 mg, sodium chloride, sodium chloride 0.9%, sodium chloride 0.9%    Family History     Problem Relation (Age of Onset)    Alcohol abuse Father    Hypertension Mother, Father, Sister, Brother    Stroke Mother        Tobacco Use    Smoking status: Never Smoker    Smokeless tobacco: Never Used   Substance and Sexual Activity    Alcohol use: No    Drug use: No    Sexual activity: Never       Review of Systems   Constitutional: Negative for appetite change and fatigue.   HENT: Negative for sore throat and trouble swallowing.    Respiratory: Positive for shortness of breath. Negative for cough and wheezing.    Cardiovascular: Positive for leg swelling. Negative for chest pain.   Gastrointestinal: Positive for constipation. Negative for diarrhea, nausea and vomiting.   Genitourinary: Negative for dysuria and hematuria.   Musculoskeletal: Positive for arthralgias (hips) and back pain. Negative for neck pain.   Skin: Negative for rash and wound.   Neurological: Negative for dizziness and light-headedness.   Psychiatric/Behavioral: Negative for confusion and dysphoric mood. The patient is not nervous/anxious.      Objective:     Vital Signs (Most Recent):  Temp: 98.1 °F (36.7 °C) (01/06/20 1557)  Pulse: 108 (01/06/20 1557)  Resp: 18 (01/06/20 1557)  BP: 134/66 (01/06/20 1557)  SpO2: (!) 93 % (01/06/20 1557) Vital Signs (24h Range):  Temp:  [97.4 °F (36.3 °C)-98.1 °F (36.7 °C)] 98.1 °F (36.7 °C)  Pulse:  [] 108  Resp:  [16-20] 18  SpO2:  [93 %-97 %] 93 %  BP: (109-143)/(51-76) 134/66     Weight: (refuse weight)  Body mass index is 41.12 kg/m².    Review of Symptoms  Symptom Assessment (ESAS 0-10 scale)   ESAS 0 1 2 3 4 5 6 7 8 9 10   Pain           X   Dyspnea  X            Anxiety X             Nausea X             Depression  X             Anorexia X              Fatigue X             Insomnia X             Restlessness  X             Agitation X             CAM / Delirium _X_ --  ___+   Constipation     __ --  _X_+   Diarrhea           _X_ --  ___+  Bowel Management Plan (BMP): Yes    Comments: Senna-Docusate ordered BID PRN    Pain Assessment: Location: bilateral hips and back  Quality: shooting  Quantity: 10/10 in intensity  Aggravating factors: worsens with movement or activity  Alleviating factors: improves with Morphine      OME in 24 hours: 40    Performance Status: 50    Physical Exam   Constitutional: He is oriented to person, place, and time. He appears well-developed and well-nourished.   HENT:   Head: Normocephalic and atraumatic.   Eyes: Conjunctivae and EOM are normal.   Neck: Normal range of motion. Neck supple.   Cardiovascular: Regular rhythm. Tachycardia present.   Pulmonary/Chest: Effort normal. No respiratory distress.   Abdominal: Soft. There is no tenderness.   Musculoskeletal: Normal range of motion. He exhibits edema.   Neurological: He is alert and oriented to person, place, and time.   Skin: Skin is warm and dry.   Psychiatric: He has a normal mood and affect. His behavior is normal. Thought content normal.   Vitals reviewed.      Significant Labs: All pertinent labs within the past 24 hours have been reviewed.  CBC:   Recent Labs   Lab 01/06/20  0209   WBC 9.60   HGB 7.2*   HCT 25.2*   MCV 86        BMP:  Recent Labs   Lab 01/06/20  0209   *  136*     140   K 4.3  4.3   CL 93*  93*   CO2 31*  31*   *  100*   CREATININE 3.1*  3.1*   CALCIUM 9.7  9.7   MG 2.5     LFT:  Lab Results   Component Value Date    AST 26 01/06/2020     (H) 04/21/2019    ALKPHOS 247 (H) 01/06/2020    BILITOT 1.5 (H) 01/06/2020     Albumin:   Albumin   Date Value Ref Range Status   01/06/2020 3.3 (L) 3.5 - 5.2 g/dL Final     Protein:   Total Protein   Date Value Ref Range Status   01/06/2020 8.9 (H) 6.0 - 8.4 g/dL Final     Lactic  acid:   Lab Results   Component Value Date    LACTATE 1.0 2019    LACTATE 1.0 2019       Significant Imaging: I have reviewed all pertinent imaging results/findings within the past 24 hours.    Advance Care Planning   Advanced Directives::  Living Will: No  LaPOST: No  Do Not Resuscitate Status: No  Medical Power of : No. Patient identifies brother Kris [place] ([phone]) as his surrogate decision maker.    Decision-Making Capacity: Patient answered questions       Living Arrangements: Lives alone    Psychosocial/Cultural:  Patient is unmarried and has no children. His parents are . He has 5 siblings and a lot of cousins and friends. He lives alone in [place] but says his brother Kris lives right next to him. 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 He says that his goal is to get healthy. He worries about dying.    Spiritual:     F- Melina and Belief: does not identify with a particular Synagogue but prays to God    I - Importance: somewhat important  .  C - Community: does not belong to a Christianity    A - Address in Care:  visits      > 50% of 35 min visit spent in chart review, face to face discussion of goals of care,  symptom assessment, coordination of care and emotional support.    Vira Davila NP  Palliative Medicine  [facility]

## 2020-01-07 NOTE — CARE UPDATE
Rapid Response Nurse Chart Check     Chart check completed, abnormal VS noted. Bedside RN Barbie contacted, no concerns verbalized at this time, instructed to call [phone] for further concerns or assistance.

## 2020-01-07 NOTE — ASSESSMENT & PLAN NOTE
25 yo male with HFrEF (25%), CKD 3, and multiple hospital admissions for heart failure exacerbations presenting this admission for acute on chronic heart failure. Despite aggressive diuresis with Lasix 120mg TID, decreased urinary output. He was later transitioned to lasix gtt at 30 mg/hr with multiple doses of diuril without improvement in UPO. Nephrology consult for volume overload refractory to diuresis.    Plan/Recommendation:  - Continue current regimen with lasix infusion at 30 mg/hr  - Would add metolazone 10mg qd, may titrate to BID depending on urinary output  - Would recommend continued medical management   - Continue to monitor strict In/outs, daily weights  - Will continue to follow pending discussions between patient and palliative care/hospice

## 2020-01-07 NOTE — PLAN OF CARE
Plan of care discussed with patient and family. No questions or concerns regarding care plan. VS stable. Patient chairfast. Able to shift weight as needed and with encouragement. Continues on Lasix therapy. Potassium replaced as ordered. 20G tp RFA and MILLIE removed. Pt has 20G to LFA x2. Will continue to monitor.

## 2020-01-07 NOTE — CONSULTS
[facility]  Nephrology  Consult Note    Patient Name: Hamlet Terrell  MRN: [id number]  Admission Date: 12/24/2019  Hospital Length of Stay: 14 days  Attending Provider: Antoinette Goins MD   Primary Care Physician: Carlin Swift MD  Principal Problem:Acute on chronic combined systolic and diastolic congestive heart failure    Inpatient consult to Nephrology  Consult performed by: Harris Zarco DO  Consult ordered by: Antoinette Goins MD        Subjective:     HPI: The pt is a 55 yo M with chronic combined HF (EF 25% on 11/27), atrial fibrillation on chronic AC (coumadin), CAD, CVA, and history of PE that presented to the ED with SOB & edema. Nephrology was consulted for assistance with management of cardiorenal syndrome in the setting of JEAN-PIERRE on CKD III now resistive to diuretic management. Baseline Cr appears to be 1.6-1.9. Cr elevated to 3.1 at time of consult. Of note, he was recently admitted to Hospital Medicine from 11/24-12/8 for a CHF exacerbation.  During that admission, he was evaluated by Cardiology who started him on Dobutamine and Diuril.  He was DC'ed home on Lasix 80 mg po BID.  He reports that he was compliant with this regimen, but despite this, he had continued to gain weight, have lower extremity and abdominal swelling, as well as shortness of breath. He was discharged with a weight of 259lbs, and claims that is around his dry weight. He is currently 283 lbs.       Per Dr. Goins:   Patient started on IV diuretics and  infusion for diagnosis of acute on chronic combined heart failure. His weight was stagnant and UOP tapered, so he was transitioned to IVP lasix for IV lasix gtt as this was actually effective during prior admission under guidance from co-management cardiology. However, the patient actually worsened symptomatically and TBili increased along with Cr and weight increase. The patient is now started back on lasix infusion with diuril augmentation. The patient's course is  also c/b epistaxis, possibly related to his decompensated picture. He has required pRBC transfusion. He refuses to apply pressure to the bridge of his nose as he says he cannot breathe through his mouth.  The patient's course is also c/b fever, likely due to pRBC transfusion.     Cardiology consulted for diuretic resistance. Despite two-week hospital stay thus far, he is only down 3 lbs. Nephrology consulted for acute renal failure and consideration of temporary HD for volume removal. Pt continues to exhibit poor insight into his disease. His wishes (to get healthy, ride his bike, walk to the store, return to the hospital if needed) is not on par with hospice. He reported similar feelings to Palliative Care yesterday. Discussed with Palliative Care provider. He is however now amenable for home dobutamine. PICC placement will likely be an issue given his current renal failure. Hb 6.9 secondary to renal failure; pt would like to hold off on transfusion today.    Past Medical History:   Diagnosis Date    Anticoagulant long-term use     Cardiomegaly     Chronic combined systolic and diastolic congestive heart failure     Coronary artery disease     Heart attack 2006    Hypertension     Hyperthyroidism, subclinical 1/2/2013    MI (myocardial infarction) 9/22/2013    MI in 2009      Paroxysmal atrial fibrillation     PE (pulmonary embolism) 1/1/2013    IN 2010     S/P ablation of atrial flutter 2008    Stroke 2009    no residual weaknesses       Past Surgical History:   Procedure Laterality Date    COLONOSCOPY N/A 12/2/2019    Procedure: COLONOSCOPY;  Surgeon: Scott Rosario MD;  Location: [address]);  Service: Endoscopy;  Laterality: N/A;    ESOPHAGOGASTRODUODENOSCOPY N/A 12/2/2019    Procedure: EGD (ESOPHAGOGASTRODUODENOSCOPY);  Surgeon: Scott Rosario MD;  Location: [address]);  Service: Endoscopy;  Laterality: N/A;    RADIOFREQUENCY ABLATION  01/08/2008    for atrial flutter        Review of patient's allergies indicates:   Allergen Reactions    Acetaminophen      Itching    Oxycodone-acetaminophen      Other reaction(s): Itching    Ace inhibitors Other (See Comments)     cough     Current Facility-Administered Medications   Medication Frequency    0.9%  NaCl infusion (for blood administration) Q24H PRN    acetaminophen tablet 650 mg Q4H PRN    aspirin EC tablet 81 mg Daily    dextrose 10% (D10W) Bolus PRN    dextrose 10% (D10W) Bolus PRN    DOBUTamine 500mg in D5W 250mL infusion (premix) (NON-TITRATING) Continuous    ferrous sulfate EC tablet 325 mg Daily    furosemide (LASIX) 2 mg/mL in sodium chloride 0.9% 100 mL infusion (conc: 2 mg/mL) Continuous    glucagon (human recombinant) injection 1 mg PRN    glucose chewable tablet 16 g PRN    glucose chewable tablet 24 g PRN    isosorbide-hydrALAZINE 20-37.5 mg per tablet 1 tablet BID    lidocaine 5 % patch 1 patch Q24H    melatonin tablet 6 mg Nightly PRN    methocarbamol tablet 500 mg TID PRN    methyl salicylate-menthol 15-10% cream TID    morphine injection 4 mg Q6H PRN    ondansetron injection 8 mg Q8H PRN    oxyCODONE immediate release tablet 15 mg Q4H PRN    promethazine (PHENERGAN) 25 mg in dextrose 5 % 50 mL IVPB Q6H PRN    senna-docusate 8.6-50 mg per tablet 1 tablet BID PRN    sodium chloride 0.65 % nasal spray 1 spray PRN    sodium chloride 0.9% flush 10 mL PRN    sodium chloride 0.9% flush 5 mL PRN    warfarin (COUMADIN) tablet 7.5 mg Daily     Family History     Problem Relation (Age of Onset)    Alcohol abuse Father    Hypertension Mother, Father, Sister, Brother    Stroke Mother        Tobacco Use    Smoking status: Never Smoker    Smokeless tobacco: Never Used   Substance and Sexual Activity    Alcohol use: No    Drug use: No    Sexual activity: Never     Review of Systems   Constitutional: Positive for chills. Negative for fever.   HENT: Negative for congestion and rhinorrhea.    Eyes:  Negative for photophobia and visual disturbance.   Respiratory: Positive for shortness of breath.    Cardiovascular: Positive for leg swelling. Negative for chest pain and palpitations.   Gastrointestinal: Negative for nausea and vomiting.   Genitourinary: Positive for difficulty urinating (reduced). Negative for dysuria, frequency and urgency.   Musculoskeletal: Positive for back pain.   Skin: Positive for rash (lower extremities, chronic venous stasis). Negative for pallor.   Neurological: Negative for dizziness and headaches.   Psychiatric/Behavioral: Negative for agitation and behavioral problems.     Objective:     Vital Signs (Most Recent):  Temp: 98.2 °F (36.8 °C) (01/07/20 1216)  Pulse: 89 (01/07/20 1546)  Resp: 18 (01/07/20 1546)  BP: (!) 106/58 (01/07/20 1216)  SpO2: (!) 90 % (01/07/20 1546)  O2 Device (Oxygen Therapy): room air (01/07/20 1546) Vital Signs (24h Range):  Temp:  [97.9 °F (36.6 °C)-98.5 °F (36.9 °C)] 98.2 °F (36.8 °C)  Pulse:  [] 89  Resp:  [16-18] 18  SpO2:  [90 %-96 %] 90 %  BP: (106-134)/(54-68) 106/58     Weight: 128.4 kg (283 lb 1.1 oz) (01/07/20 0500)  Body mass index is 41.8 kg/m².  Body surface area is 2.5 meters squared.    I/O last 3 completed shifts:  In: 2031.7 [P.O.:1760; I.V.:271.7]  Out: 2075 [Urine:2075]    Physical Exam   Constitutional: He is oriented to person, place, and time. No distress.   Sick appearing male   HENT:   Head: Normocephalic and atraumatic.   Right Ear: External ear normal.   Left Ear: External ear normal.   Nose: Nose normal.   Eyes: Pupils are equal, round, and reactive to light. EOM are normal. No scleral icterus.   Neck: JVD (to the angle of the mandible) present. No tracheal deviation present.   Cardiovascular: Normal rate, regular rhythm and intact distal pulses. Exam reveals gallop.   Pulses:       Radial pulses are 2+ on the right side, and 2+ on the left side.   Pulmonary/Chest: Effort normal. No stridor. He has no wheezes. He has rales  (bibasilar).   Abdominal: Soft. There is no tenderness. There is no guarding.   Musculoskeletal: He exhibits edema and tenderness.   Neurological: He is alert and oriented to person, place, and time.   Skin: Skin is warm and dry. Rash (chronic venous stasis ulceration to the lower extremities bilaterally) noted. He is not diaphoretic.   Psychiatric: He has a normal mood and affect. His behavior is normal.   Nursing note and vitals reviewed.      Significant Labs:  All labs within the past 24 hours have been reviewed.        Assessment/Plan:     * Acute on chronic combined systolic and diastolic congestive heart failure  27 yo male with HFrEF (25%), CKD 3, and multiple hospital admissions for heart failure exacerbations presenting this admission for acute on chronic heart failure. Despite aggressive diuresis with Lasix 120mg TID, decreased urinary output. He was later transitioned to lasix gtt at 30 mg/hr with multiple doses of diuril without improvement in UPO. Nephrology consult for volume overload refractory to diuresis.    Plan/Recommendation:  - Continue current regimen with lasix infusion at 30 mg/hr  - Would add metolazone 10mg qd, may titrate to BID depending on urinary output  - Would recommend continued medical management   - Continue to monitor strict In/outs, daily weights  - Will continue to follow pending discussions between patient and palliative care/hospice       Cardiorenal syndrome  Plan/Recommendation:  - Continue current regimen with lasix infusion at 30 mg/hr  - Would add metolazone 10mg qd, may titrate to BID depending on urinary output  - Would recommend continued medical management   - Continue to monitor strict In/outs, daily weights  - Will continue to follow pending discussions between patient and palliative care/hospice       Palliative care encounter  Palliative care and hospice currently following   -ongoing goals of care discussion    Stage 3 chronic kidney disease  - JEAN-PIERRE on CKD III  2/2 cardiorenal syndrome   - Creatinine up trending since admission, baseline 1.6-1.9, currently 3.1          Thank you for your consult. I will follow-up with patient. Please contact us if you have any additional questions.    Harris Zarco,   Nephrology  [facility]

## 2020-01-08 LAB
ABO + RH BLD: NORMAL
ALBUMIN SERPL BCP-MCNC: 3.1 G/DL (ref 3.5–5.2)
ALP SERPL-CCNC: 222 U/L (ref 55–135)
ALT SERPL W/O P-5'-P-CCNC: 13 U/L (ref 10–44)
ANION GAP SERPL CALC-SCNC: 13 MMOL/L (ref 8–16)
ANISOCYTOSIS BLD QL SMEAR: SLIGHT
AST SERPL-CCNC: 23 U/L (ref 10–40)
BASOPHILS # BLD AUTO: 0.03 K/UL (ref 0–0.2)
BASOPHILS NFR BLD: 0.4 % (ref 0–1.9)
BILIRUB SERPL-MCNC: 1.6 MG/DL (ref 0.1–1)
BLD GP AB SCN CELLS X3 SERPL QL: NORMAL
BNP SERPL-MCNC: 229 PG/ML (ref 0–99)
BUN SERPL-MCNC: 111 MG/DL (ref 6–20)
CALCIUM SERPL-MCNC: 9.4 MG/DL (ref 8.7–10.5)
CHLORIDE SERPL-SCNC: 89 MMOL/L (ref 95–110)
CO2 SERPL-SCNC: 34 MMOL/L (ref 23–29)
CREAT SERPL-MCNC: 3.2 MG/DL (ref 0.5–1.4)
DIFFERENTIAL METHOD: ABNORMAL
EOSINOPHIL # BLD AUTO: 0.7 K/UL (ref 0–0.5)
EOSINOPHIL NFR BLD: 8.1 % (ref 0–8)
ERYTHROCYTE [DISTWIDTH] IN BLOOD BY AUTOMATED COUNT: 22.8 % (ref 11.5–14.5)
EST. GFR  (AFRICAN AMERICAN): 24.2 ML/MIN/1.73 M^2
EST. GFR  (NON AFRICAN AMERICAN): 21 ML/MIN/1.73 M^2
GIANT PLATELETS BLD QL SMEAR: PRESENT
GLUCOSE SERPL-MCNC: 134 MG/DL (ref 70–110)
HCT VFR BLD AUTO: 23.7 % (ref 40–54)
HGB BLD-MCNC: 6.8 G/DL (ref 14–18)
HYPOCHROMIA BLD QL SMEAR: ABNORMAL
IMM GRANULOCYTES # BLD AUTO: 0.04 K/UL (ref 0–0.04)
IMM GRANULOCYTES NFR BLD AUTO: 0.5 % (ref 0–0.5)
INR PPP: 2.8 (ref 0.8–1.2)
LYMPHOCYTES # BLD AUTO: 0.6 K/UL (ref 1–4.8)
LYMPHOCYTES NFR BLD: 7.5 % (ref 18–48)
MAGNESIUM SERPL-MCNC: 2.6 MG/DL (ref 1.6–2.6)
MCH RBC QN AUTO: 25.1 PG (ref 27–31)
MCHC RBC AUTO-ENTMCNC: 28.7 G/DL (ref 32–36)
MCV RBC AUTO: 88 FL (ref 82–98)
MONOCYTES # BLD AUTO: 1.5 K/UL (ref 0.3–1)
MONOCYTES NFR BLD: 18.7 % (ref 4–15)
NEUTROPHILS # BLD AUTO: 5.3 K/UL (ref 1.8–7.7)
NEUTROPHILS NFR BLD: 64.8 % (ref 38–73)
NRBC BLD-RTO: 0 /100 WBC
OVALOCYTES BLD QL SMEAR: ABNORMAL
PHOSPHATE SERPL-MCNC: 4 MG/DL (ref 2.7–4.5)
PLATELET # BLD AUTO: 252 K/UL (ref 150–350)
PMV BLD AUTO: 10.5 FL (ref 9.2–12.9)
POIKILOCYTOSIS BLD QL SMEAR: SLIGHT
POLYCHROMASIA BLD QL SMEAR: ABNORMAL
POTASSIUM SERPL-SCNC: 3.9 MMOL/L (ref 3.5–5.1)
PROT SERPL-MCNC: 8.4 G/DL (ref 6–8.4)
PROTHROMBIN TIME: 26.5 SEC (ref 9–12.5)
RBC # BLD AUTO: 2.71 M/UL (ref 4.6–6.2)
SODIUM SERPL-SCNC: 136 MMOL/L (ref 136–145)
TARGETS BLD QL SMEAR: ABNORMAL
URATE SERPL-MCNC: 16 MG/DL (ref 3.4–7)
WBC # BLD AUTO: 8.23 K/UL (ref 3.9–12.7)

## 2020-01-08 PROCEDURE — 85025 COMPLETE CBC W/AUTO DIFF WBC: CPT

## 2020-01-08 PROCEDURE — 84100 ASSAY OF PHOSPHORUS: CPT

## 2020-01-08 PROCEDURE — 80053 COMPREHEN METABOLIC PANEL: CPT

## 2020-01-08 PROCEDURE — 99233 SBSQ HOSP IP/OBS HIGH 50: CPT | Mod: ,,, | Performed by: HOSPITALIST

## 2020-01-08 PROCEDURE — 99233 PR SUBSEQUENT HOSPITAL CARE,LEVL III: ICD-10-PCS | Mod: ,,, | Performed by: INTERNAL MEDICINE

## 2020-01-08 PROCEDURE — 25000003 PHARM REV CODE 250: Performed by: HOSPITALIST

## 2020-01-08 PROCEDURE — 63600175 PHARM REV CODE 636 W HCPCS: Performed by: HOSPITALIST

## 2020-01-08 PROCEDURE — 20600001 HC STEP DOWN PRIVATE ROOM

## 2020-01-08 PROCEDURE — 85610 PROTHROMBIN TIME: CPT

## 2020-01-08 PROCEDURE — 99233 PR SUBSEQUENT HOSPITAL CARE,LEVL III: ICD-10-PCS | Mod: ,,, | Performed by: HOSPITALIST

## 2020-01-08 PROCEDURE — 36415 COLL VENOUS BLD VENIPUNCTURE: CPT

## 2020-01-08 PROCEDURE — 84550 ASSAY OF BLOOD/URIC ACID: CPT

## 2020-01-08 PROCEDURE — 83735 ASSAY OF MAGNESIUM: CPT

## 2020-01-08 PROCEDURE — 99233 SBSQ HOSP IP/OBS HIGH 50: CPT | Mod: ,,, | Performed by: INTERNAL MEDICINE

## 2020-01-08 PROCEDURE — 86920 COMPATIBILITY TEST SPIN: CPT

## 2020-01-08 PROCEDURE — 83880 ASSAY OF NATRIURETIC PEPTIDE: CPT

## 2020-01-08 PROCEDURE — 86850 RBC ANTIBODY SCREEN: CPT

## 2020-01-08 RX ORDER — HYDROCODONE BITARTRATE AND ACETAMINOPHEN 500; 5 MG/1; MG/1
TABLET ORAL
Status: DISCONTINUED | OUTPATIENT
Start: 2020-01-08 | End: 2020-01-13

## 2020-01-08 RX ORDER — SODIUM CHLORIDE 0.9 % (FLUSH) 0.9 %
10 SYRINGE (ML) INJECTION
Status: DISCONTINUED | OUTPATIENT
Start: 2020-01-08 | End: 2020-01-13

## 2020-01-08 RX ORDER — OXYMETAZOLINE HCL 0.05 %
2 SPRAY, NON-AEROSOL (ML) NASAL 2 TIMES DAILY
Status: DISPENSED | OUTPATIENT
Start: 2020-01-08 | End: 2020-01-09

## 2020-01-08 RX ORDER — PREDNISONE 20 MG/1
40 TABLET ORAL DAILY
Status: COMPLETED | OUTPATIENT
Start: 2020-01-08 | End: 2020-01-12

## 2020-01-08 RX ORDER — FUROSEMIDE 10 MG/ML
80 INJECTION INTRAMUSCULAR; INTRAVENOUS ONCE
Status: COMPLETED | OUTPATIENT
Start: 2020-01-08 | End: 2020-01-08

## 2020-01-08 RX ORDER — METOLAZONE 10 MG/1
10 TABLET ORAL 2 TIMES DAILY
Status: DISCONTINUED | OUTPATIENT
Start: 2020-01-08 | End: 2020-01-12

## 2020-01-08 RX ORDER — OXYMETAZOLINE HCL 0.05 %
2 SPRAY, NON-AEROSOL (ML) NASAL 2 TIMES DAILY PRN
Status: ACTIVE | OUTPATIENT
Start: 2020-01-08 | End: 2020-01-11

## 2020-01-08 RX ADMIN — MORPHINE SULFATE 4 MG: 2 INJECTION, SOLUTION INTRAMUSCULAR; INTRAVENOUS at 08:01

## 2020-01-08 RX ADMIN — MENTHOL, METHYL SALICYLATE: 10; 15 CREAM TOPICAL at 08:01

## 2020-01-08 RX ADMIN — HYDRALAZINE HYDROCHLORIDE AND ISOSORBIDE DINITRATE 1 TABLET: 37.5; 2 TABLET, FILM COATED ORAL at 09:01

## 2020-01-08 RX ADMIN — WARFARIN SODIUM 7.5 MG: 7.5 TABLET ORAL at 04:01

## 2020-01-08 RX ADMIN — FUROSEMIDE 40 MG/HR: 10 INJECTION, SOLUTION INTRAMUSCULAR; INTRAVENOUS at 07:01

## 2020-01-08 RX ADMIN — METOLAZONE 10 MG: 10 TABLET ORAL at 08:01

## 2020-01-08 RX ADMIN — OXYCODONE HYDROCHLORIDE 15 MG: 10 TABLET ORAL at 05:01

## 2020-01-08 RX ADMIN — HYDRALAZINE HYDROCHLORIDE AND ISOSORBIDE DINITRATE 1 TABLET: 37.5; 2 TABLET, FILM COATED ORAL at 08:01

## 2020-01-08 RX ADMIN — FUROSEMIDE 80 MG: 10 INJECTION, SOLUTION INTRAMUSCULAR; INTRAVENOUS at 11:01

## 2020-01-08 RX ADMIN — MORPHINE SULFATE 4 MG: 2 INJECTION, SOLUTION INTRAMUSCULAR; INTRAVENOUS at 10:01

## 2020-01-08 RX ADMIN — MENTHOL, METHYL SALICYLATE: 10; 15 CREAM TOPICAL at 03:01

## 2020-01-08 RX ADMIN — FUROSEMIDE 40 MG/HR: 10 INJECTION, SOLUTION INTRAMUSCULAR; INTRAVENOUS at 09:01

## 2020-01-08 RX ADMIN — MORPHINE SULFATE 4 MG: 2 INJECTION, SOLUTION INTRAMUSCULAR; INTRAVENOUS at 04:01

## 2020-01-08 RX ADMIN — MENTHOL, METHYL SALICYLATE: 10; 15 CREAM TOPICAL at 09:01

## 2020-01-08 RX ADMIN — PREDNISONE 40 MG: 20 TABLET ORAL at 03:01

## 2020-01-08 RX ADMIN — FUROSEMIDE 40 MG/HR: 10 INJECTION, SOLUTION INTRAMUSCULAR; INTRAVENOUS at 01:01

## 2020-01-08 RX ADMIN — ASPIRIN 81 MG: 81 TABLET, COATED ORAL at 09:01

## 2020-01-08 RX ADMIN — DOBUTAMINE IN DEXTROSE 2.5 MCG/KG/MIN: 200 INJECTION, SOLUTION INTRAVENOUS at 09:01

## 2020-01-08 RX ADMIN — FERROUS SULFATE TAB EC 325 MG (65 MG FE EQUIVALENT) 325 MG: 325 (65 FE) TABLET DELAYED RESPONSE at 09:01

## 2020-01-08 RX ADMIN — FUROSEMIDE 40 MG/HR: 10 INJECTION, SOLUTION INTRAMUSCULAR; INTRAVENOUS at 05:01

## 2020-01-08 RX ADMIN — Medication 2 SPRAY: at 10:01

## 2020-01-08 NOTE — PROGRESS NOTES
[facility]  Palliative Medicine  Progress Note    Patient Name: Hamlet Terrell  MRN: [id number]  Admission Date: 2019  Hospital Length of Stay: 15 days  Code Status: Full Code   Attending Provider: Antoinette Goins MD  Consulting Provider: Vira Davila NP  Primary Care Physician: Carlin Swift MD  Principal Problem:Acute on chronic combined systolic and diastolic congestive heart failure    Patient information was obtained from patient, past medical records and Dr. Goins.      Assessment/Plan:     Palliative care encounter  Palliative care consulted for goals of care discussion/advanced care planning for this 55 y/o male being followed by hospital medicine for Acute on Chronic Combined Systolic and Diastolic Heart Failure, Cardiomyopathy, JEAN-PIERRE on CKD3, Chronic AFib, Essential HTN, CAD, Anemia, Elevated Alk Phos, Abnormal CT, History of MI, History of VTE and History of Stroke. Rounded on patient today with ZULMA RDZ, no family members at bedside. [facility] reps and nephrology team both stopped by room during this visit. Patient is alert, oriented, and cooperative with visit but irritable at times. He complains that his IVs need to be changed out and nursing will not do it; per nursing documentation patient has refused IV sticks multiple times. He is agreeable to having PICC placed if possible so he will not need to have so many needle sticks in the future.     Plan/Recommendations:  1. See goals of care discussion below.  2. Palliative care will follow up with patient.    Goals of Care/Advance Care Plannin/7 Rounded on patient today with BERTA Granado LCSW, no family members at bedside. Patient is alert, oriented, and cooperative with visit but irritable at times. He expresses frustration with being in the hospital. Discussed possible options at discharge including home health, home palliative care, and home hospice. Patient met with [facility] reps last week and they stopped  "by today to see him. He remains undecided about hospice but understands that they can help keep him out the hospital after discharge if that is what he wants. He does not like the idea of going home on Dobutamine drip but now says that he would be willing as long as he does not have to roll an IV pole around. He says he will carry a "purse" if needed. Nephrology rounded on patient today and discussed possible need for dialysis. Patient is willing to consider trying dialysis for a "week or so" but is adamantly opposed to long-term dialysis. He is familiar with dialysis because his brother is a dialysis patient and says that he can't sit still for that long. He states that he would never want long-term dialysis even if he would die without it. At this time he remains full code.    1/6 Rounded on patient today, no family members at bedside. Patient is alert, oriented, and cooperative with visit. He expresses frustration with being in the hospital and has been intermittently refusing IV sticks, lab draws, and vital signs. Reminded patient that he has the option to enroll in hospice if he prefers to discontinue aggressive treatment and focus on comfort. Patient met with [facility] rep last week but has not yet made any decisions; he says that he is still thinking about it. Discussed the plan for patient to discharge on  drip but patient now says that he does not want to be on a drip when he gets discharged because he is not "toting no bag around." Patient has been told by primary team that he will likely need  drip for the rest of his life but he states that he is going to "prove them wrong." He says that he was told at age 42 that he would die without a defibrillator and he is still here so he has "proved them wrong" before. He also says that he would like to go to the rehab hospital down the street when he leaves here so he can get stronger. His goal remains to "get healthy" and he wishes to remain full " "code. He has poor understanding and insight related to his clinical condition and prognosis. Will involve palliative care  for assistance with emotional support and coping skills.    1/3 Conference conducted by this APRN with patient to discuss his current clinical status, goals of care, treatment options, code status, long term expected outcomes and prognostic awareness. After a discussion concerning his values and wishes, patient verbalized limited understanding and insight related to his clinical condition and prognostic awareness. His stated goal is to "get healthy." He has been told that he will need to be on  gtt for the rest of his life but still hopes that he can live a long time. He has never thought about his end of life preferences and whether he would prefer to die at home or at the hospital. Discussed home hospice as an option that would allow him to receive care in the home and provide management of symptoms. Patient is not ready to enroll in hospice at this time but is interested in meeting with a hospice rep to get more information on their services.    Patient has never completed a living will or MPOA. His preference for surrogate decision maker would be his brother Kris [place]. Explained to patient that palliative care can assist with completion of MPOA paperwork whenever he is ready. He is currently a full code and not ready to make any changes to his code status.        I will follow-up with patient. Please contact us if you have any additional questions.    Subjective:     Chief Complaint:   Chief Complaint   Patient presents with    Shortness of Breath     reports fluid retention        HPI:   Mr. Terrell is a 53 y/o male with PMHx of chronic combined systolic and diastolic heart failure (EF 25%), afib on Coumadin, CAD, CVA, and history of PE who presented to [facility] on [place]/24 for evaluation of shortness of breath and edema. He was just admitted to Hospital Medicine from " 11/24-12/8 for CHF exacerbation. During that admission, he was evaluated by Cardiology who started him on Dobutamine and Diuril. He was resistant to HTS evaluation during that stay, which included recommendations for LVAD, transplant, and ICD. He was discharged home on Lasix 80mg PO BID. He endorsed compliance with his diuretic regimen, but despite this and following a low salt diet, he continued to have dyspnea, orthopnea, edema and weight gain. He also complained of mostly dry cough with occasional production of blood tinged sputum.     CXR in the ED showed cardiomegaly and pulmonary edema, along with a masslike opacity. CT chest was ordered to evaluate the mass, but he was unable to lie flat to get it evaluated. He was given Lasix 80mg IV and was admitted to Hospital Medicine for further management. He was started on Lasix and  gtt.    Hospital Course:  No notes on file    Interval History: Patient has remained on  gtt at 2.5 mcg/kg since admission. He initially received several doses of IV Diuril but diuresis was limited. He responded well after Lasix gtt was increased to 30 mg/hr. Lasix gtt was stopped and patient was started on Lasix IVP on 1/3 but he began to worsen symptomatically. He received IV Diuril on 1/4 and was started back on Lasix gtt on 1/5. He had an episode of epitaxis on 1/4 and required 1 unit PRBC transfusion after Hgb dropped to 6.8. He continues to receive Diuril augmentation.    Past Medical History:   Diagnosis Date    Anticoagulant long-term use     Cardiomegaly     Chronic combined systolic and diastolic congestive heart failure     Coronary artery disease     Heart attack 2006    Hypertension     Hyperthyroidism, subclinical 1/2/2013    MI (myocardial infarction) 9/22/2013    MI in 2009      Paroxysmal atrial fibrillation     PE (pulmonary embolism) 1/1/2013    IN 2010     S/P ablation of atrial flutter 2008    Stroke 2009    no residual weaknesses       Past Surgical  History:   Procedure Laterality Date    COLONOSCOPY N/A 12/2/2019    Procedure: COLONOSCOPY;  Surgeon: Scott Rosario MD;  [facility]  Service: Endoscopy;  Laterality: N/A;    ESOPHAGOGASTRODUODENOSCOPY N/A 12/2/2019    Procedure: EGD (ESOPHAGOGASTRODUODENOSCOPY);  Surgeon: Scott Rosario MD;  [facility]  Service: Endoscopy;  Laterality: N/A;    RADIOFREQUENCY ABLATION  01/08/2008    for atrial flutter       Review of patient's allergies indicates:   Allergen Reactions    Acetaminophen      Itching    Oxycodone-acetaminophen      Other reaction(s): Itching    Ace inhibitors Other (See Comments)     cough       Medications:  Continuous Infusions:   DOBUTamine 2.5 mcg/kg/min (01/06/20 1692)    furosemide (LASIX) 2 mg/mL infusion (non-titrating) 40 mg/hr (01/08/20 0128)     Scheduled Meds:   aspirin  81 mg Oral Daily    ferrous sulfate  325 mg Oral Daily    isosorbide-hydrALAZINE 20-37.5 mg  1 tablet Oral BID    lidocaine  1 patch Transdermal Q24H    methyl salicylate-menthol 15-10%   Topical (Top) TID    warfarin  7.5 mg Oral Daily     PRN Meds:sodium chloride, sodium chloride, acetaminophen, Dextrose 10% Bolus, Dextrose 10% Bolus, diphenhydrAMINE, glucagon (human recombinant), glucose, glucose, melatonin, methocarbamol, morphine, ondansetron, oxyCODONE, promethazine (PHENERGAN) IVPB, senna-docusate 8.6-50 mg, sodium chloride, sodium chloride 0.9%, sodium chloride 0.9%, sodium chloride 0.9%    Family History     Problem Relation (Age of Onset)    Alcohol abuse Father    Hypertension Mother, Father, Sister, Brother    Stroke Mother        Tobacco Use    Smoking status: Never Smoker    Smokeless tobacco: Never Used   Substance and Sexual Activity    Alcohol use: No    Drug use: No    Sexual activity: Never       Review of Systems   Constitutional: Negative for appetite change and fatigue.   HENT: Negative for sore throat and trouble swallowing.     Respiratory: Positive for shortness of breath. Negative for cough and wheezing.    Cardiovascular: Positive for leg swelling. Negative for chest pain.   Gastrointestinal: Positive for constipation. Negative for diarrhea, nausea and vomiting.   Genitourinary: Negative for dysuria and hematuria.   Musculoskeletal: Positive for arthralgias (hips) and back pain. Negative for neck pain.   Skin: Negative for rash and wound.   Neurological: Negative for dizziness and light-headedness.   Psychiatric/Behavioral: Negative for confusion and dysphoric mood. The patient is not nervous/anxious.      Objective:     Vital Signs (Most Recent):  Temp: 97.7 °F (36.5 °C) (01/08/20 0433)  Pulse: 65 (01/08/20 0433)  Resp: 16 (01/08/20 0433)  BP: (!) 121/58 (01/08/20 0433)  SpO2: 98 % (01/08/20 0433) Vital Signs (24h Range):  Temp:  [97.3 °F (36.3 °C)-98.4 °F (36.9 °C)] 97.7 °F (36.5 °C)  Pulse:  [] 65  Resp:  [16-18] 16  SpO2:  [90 %-98 %] 98 %  BP: (102-136)/(55-71) 121/58     Weight: 128.4 kg (283 lb 1.1 oz)  Body mass index is 41.8 kg/m².    Review of Symptoms  Symptom Assessment (ESAS 0-10 scale)   ESAS 0 1 2 3 4 5 6 7 8 9 10   Pain    X          Dyspnea  X            Anxiety X             Nausea X             Depression  X             Anorexia X             Fatigue X             Insomnia X             Restlessness  X             Agitation X             CAM / Delirium _X_ --  ___+   Constipation     __ --  _X_+   Diarrhea           _X_ --  ___+  Bowel Management Plan (BMP): Yes    Comments: Senna-Docusate ordered BID PRN    Pain Assessment: Location: bilateral hips, legs and back  Quality: shooting  Quantity: 3/10 in intensity  Aggravating factors: worsens with movement or activity  Alleviating factors: improves with Morphine      OME in 24 hours: 55    Performance Status: 50    Physical Exam   Constitutional: He is oriented to person, place, and time. He appears well-developed and well-nourished.   HENT:   Head:  Normocephalic and atraumatic.   Eyes: Conjunctivae and EOM are normal.   Neck: Normal range of motion. Neck supple.   Cardiovascular: Regular rhythm. Tachycardia present.   Pulmonary/Chest: Effort normal. No respiratory distress.   Abdominal: Soft. There is no tenderness.   Musculoskeletal: Normal range of motion. He exhibits edema.   Neurological: He is alert and oriented to person, place, and time.   Skin: Skin is warm and dry.   Psychiatric: His behavior is normal. Thought content normal. Cognition and memory are normal.   Irritable at times   Vitals reviewed.      Significant Labs: All pertinent labs within the past 24 hours have been reviewed.  CBC:   Recent Labs   Lab 20   WBC 8.23   HGB 6.8*   HCT 23.7*   MCV 88        BMP:  Recent Labs   Lab 20   *      K 3.9   CL 89*   CO2 34*   *   CREATININE 3.2*   CALCIUM 9.4   MG 2.6     LFT:  Lab Results   Component Value Date    AST 23 2020     (H) 2019    ALKPHOS 222 (H) 2020    BILITOT 1.6 (H) 2020     Albumin:   Albumin   Date Value Ref Range Status   2020 3.1 (L) 3.5 - 5.2 g/dL Final     Protein:   Total Protein   Date Value Ref Range Status   2020 8.4 6.0 - 8.4 g/dL Final     Lactic acid:   Lab Results   Component Value Date    LACTATE 1.0 2019    LACTATE 1.0 2019       Significant Imaging: I have reviewed all pertinent imaging results/findings within the past 24 hours.    Advance Care Planning   Advanced Directives::  Living Will: No  LaPOST: No  Do Not Resuscitate Status: No  Medical Power of : No. Patient identifies brother Kris [place] ([phone]) as his surrogate decision maker.    Decision-Making Capacity: Patient answered questions       Living Arrangements: Lives alone    Psychosocial/Cultural:  Patient is unmarried and has no children. His parents are . He has 5 siblings and a lot of cousins and friends. He lives alone in [place] but says his brother Kris lives right next to him. 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 He says that his goal is to get healthy. He worries about dying.    Spiritual:     F- Melina and Belief: does not identify with a particular Confucianism but prays to God    I - Importance: somewhat important  .  C - Community: does not belong to a Protestant    A - Address in Care:  visits      > 50% of 50 min visit spent in chart review, face to face discussion of goals of care, symptom assessment, coordination of care and emotional support.    Vira Davila NP  Palliative Medicine  [facility]

## 2020-01-08 NOTE — PROGRESS NOTES
Progress Note  Hospital Medicine    Provider team:    [facility] HOSP MED C  [facility] NEPHROLOGY CONSULT SERVICE  Admit Date: 12/24/2019  Encounter Date: 01/08/2020     SUBJECTIVE:     Follow-up Visit for: Acute on chronic combined systolic and diastolic congestive heart failure    HPI (See H&P for complete P,F,SHx):  54M with chronic combined systolic and diastolic heart failure (EF 25%), afib on Coumadin, CAD, CVA, and history of PE who presents to the ED for evaluation of shortness of breath and edema.  He was just admitted to Hospital Medicine from 11/24-12/8 for a CHF exacerbation.  During that admission, he was evaluated by Cardiology who started him on Dobutamine and Diuril.  He was resistant to HTS evaluation during that stay, which included recommendations for LVAD, transplant, and ICD.  He was discharged home on Lasix 80mg PO BID.  He endorses compliance with his diuretic regimen, but despite this and following a low salt diet, he has continued to gain weight, have lower extremity and abdominal swelling, as well as shortness of breath.  He is so short of breath that he has to sleep in a chair, and has been unable to sleep because of his breathing.  He denies any chest pain.  He does have a cough that is mostly dry, but occasionally productive of blood tinged sputum.  He was discharged weight a weight of 259lbs, and claims that is around his dry weight.  He is currently 286lbs.       Upon arrival to the ED, vitals were /83, HR 88, RR 22.  Labs showed Hemoglobin 7.3, INR 1.5, Creatinine 1.8, Alk Phos 278,  with Trop 0.128. CXR showed cardiomegaly and pulmonary edema, along with a masslike opacity. CT chest was attempted to be ordered to evaluate the mass, but he was unable to lie flat to get it evaluated.  He was given Lasix 80mg IV and was admitted to Hospital Medicine for further management.    TTE 11/27/2019  · Severely decreased LVSF.  · The estimated ejection fraction is 25%  · Concentric left  ventricular hypertrophy.  · Global hypokinetic wall motion.  · Moderate right ventricular enlargement.  · Moderately reduced RVSF.  · Severe biatrial enlargement.  · Moderate mitral regurgitation.  · Moderate tricuspid regurgitation.  · The estimated PA systolic pressure is 40 mm Hg  · Elevated central venous pressure (15 mm Hg).  · Atrial fibrillation observed.    Hospital course:  Patient started on IV diuretics and  infusion for diagnosis of acute on chronic combined heart failure. His weight was stagnant and UOP tapered, so he was transitioned to IVP lasix for IV lasix gtt as this was actually effective during prior admission under guidance from co-management cardiology. However, the patient actually worsened symptomatically and TBili increased along with Cr and weight increase. The patient is now started back on lasix infusion with diuril augmentation. The patient's course is also c/b epistaxis, possibly related to his decompensated picture. He has required pRBC transfusion. He refuses to apply pressure to the bridge of his nose as he says he cannot breathe through his mouth.     Interval history:  The patient denies any complaints today other than his usual hip and back pain. Negative only 180 mL in past 24 hours, weight up 1 lbs. Despite two-week hospital stay thus far, he is only down 2 lbs. Needs HD for volume removal, discussing w/ Nephrology. Pt planning on home hospice on discharge, however patient remains with poor insight about the definition of hospice. Hb 6.8 secondary to renal failure; pt would like to hold off on transfusion today.    Wt Readings from Last 30 Encounters:   01/07/20 128.4 kg (283 lb 1.1 oz)   12/07/19 117.6 kg (259 lb 4.2 oz)   10/23/19 108.9 kg (240 lb)   09/09/19 108.9 kg (240 lb)   08/04/19 105.2 kg (232 lb)   07/18/19 108.2 kg (238 lb 8.6 oz)   06/20/19 118.9 kg (262 lb 2 oz)   05/21/19 121.1 kg (267 lb)   05/16/19 116.3 kg (256 lb 6.3 oz)   04/20/19 127.5 kg (281 lb 1.4 oz)    01/31/19 113.4 kg (250 lb)   11/04/18 123.1 kg (271 lb 6.2 oz)   08/31/18 119.6 kg (263 lb 10.7 oz)   07/28/18 113.4 kg (250 lb)   07/10/18 120.7 kg (266 lb)   05/03/18 117.9 kg (259 lb 14.8 oz)   03/30/18 117.9 kg (260 lb)   02/21/18 117.9 kg (260 lb)   01/12/18 113.4 kg (250 lb)   12/10/17 117.9 kg (260 lb)   11/11/17 113.9 kg (251 lb)   11/01/17 111.8 kg (246 lb 7.6 oz)   10/26/17 118.5 kg (261 lb 3.2 oz)   08/13/17 117.9 kg (260 lb)   08/12/17 117.9 kg (260 lb)   05/10/17 117.9 kg (260 lb)   04/05/17 108.4 kg (239 lb 1.4 oz)   02/01/17 108.9 kg (240 lb)   12/15/16 119.4 kg (263 lb 3.7 oz)   08/26/16 112.5 kg (248 lb)     Review of Systems:  Review of Systems   Constitutional: Negative for chills and fever.   HENT: Negative for congestion and sore throat.    Eyes: Negative for photophobia, pain and discharge.   Respiratory: Positive for shortness of breath. Negative for cough, hemoptysis and sputum production.    Cardiovascular: Positive for orthopnea, leg swelling and PND. Negative for chest pain and palpitations.   Gastrointestinal: Negative for abdominal pain, diarrhea, nausea and vomiting.   Genitourinary: Negative for dysuria and urgency.   Musculoskeletal: Negative for myalgias and neck pain.   Skin: Negative for itching and rash.   Neurological: Negative for sensory change, focal weakness and headaches.   Endo/Heme/Allergies: Negative for polydipsia. Does not bruise/bleed easily.   Psychiatric/Behavioral: Negative for depression and suicidal ideas.     Past Medical History:   Diagnosis Date    Anticoagulant long-term use     Cardiomegaly     Chronic combined systolic and diastolic congestive heart failure     Coronary artery disease     Heart attack 2006    Hypertension     Hyperthyroidism, subclinical 1/2/2013    MI (myocardial infarction) 9/22/2013    MI in 2009      Paroxysmal atrial fibrillation     PE (pulmonary embolism) 1/1/2013    IN 2010     S/P ablation of atrial flutter 2008     "Stroke 2009    no residual weaknesses     Social History     Socioeconomic History    Marital status: Single     Spouse name: Not on file    Number of children: Not on file    Years of education: Not on file    Highest education level: Not on file   Occupational History    Not on file   Social Needs    Financial resource strain: Not on file    Food insecurity:     Worry: Not on file     Inability: Not on file    Transportation needs:     Medical: Not on file     Non-medical: Not on file   Tobacco Use    Smoking status: Never Smoker    Smokeless tobacco: Never Used   Substance and Sexual Activity    Alcohol use: No    Drug use: No    Sexual activity: Never   Lifestyle    Physical activity:     Days per week: Not on file     Minutes per session: Not on file    Stress: Not on file   Relationships    Social connections:     Talks on phone: Not on file     Gets together: Not on file     Attends [employer] service: Not on file     Active member of club or organization: Not on file     Attends meetings of clubs or organizations: Not on file     Relationship status: Not on file   Other Topics Concern    Not on file   Social History Narrative    Not on file       OBJECTIVE:       Intake/Output Summary (Last 24 hours) at 1/8/2020 1308  Last data filed at 1/8/2020 1035  Gross per 24 hour   Intake 1297.6 ml   Output 1480 ml   Net -182.4 ml     Vital Signs Range (Last 24H):  Temp:  [96.2 °F (35.7 °C)-98.4 °F (36.9 °C)]   Pulse:  []   Resp:  [16-18]   BP: (102-138)/(55-75)   SpO2:  [90 %-98 %]   Body mass index is 41.8 kg/m².    Objective:  General Appearance:  Comfortable, well-appearing, in no acute distress and not in pain.    Vital signs: (most recent): Blood pressure 135/64, pulse 94, temperature 96.2 °F (35.7 °C), temperature source Oral, resp. rate 18, height 5' 9" (1.753 m), weight 128.4 kg (283 lb 1.1 oz), SpO2 (!) 91 %.  No fever.    Output: Producing urine and producing stool.    HEENT: Normal " HEENT exam.  (+JVD)    Lungs:  Normal effort.  Breath sounds clear to auscultation.  He is not in respiratory distress.  There are rales.  No wheezes.    Heart: Normal rate.  Regular rhythm.  S1 normal and S2 normal.  Positive for murmur.    Chest: Symmetric chest wall expansion.   Abdomen: Abdomen is soft.  Bowel sounds are normal.   There is no abdominal tenderness.     Extremities: Normal range of motion.  There is venous stasis and dependent edema.  There is no deformity, effusion or local swelling.    Pulses: Distal pulses are intact.    Neurological: Patient is alert and oriented to person, place and time.  Normal strength.    Pupils:  Pupils are equal, round, and reactive to light.    Skin:  Warm and dry.      Medications:  Medication list was reviewed in EPIC and changes noted under Assessment/Plan and MAR.    Laboratory:  Recent Labs     01/08/20 0314   WBC 8.23   RBC 2.71*   HGB 6.8*   HCT 23.7*      MCV 88   MCH 25.1*   MCHC 28.7*   GRAN 64.8  5.3   LYMPH 7.5*  0.6*   MONO 18.7*  1.5*   EOS 0.7*      Recent Labs     01/08/20 0314   *      K 3.9   CL 89*   CO2 34*   *   CREATININE 3.2*   CALCIUM 9.4   ANIONGAP 13   MG 2.6   PHOS 4.0     Prior records reviewed.    ECHO reviewed:  · Severely decreased left ventricular systolic function. The estimated ejection fraction is 25%  · Concentric left ventricular hypertrophy.  · Global hypokinetic wall motion.  · Moderate right ventricular enlargement.  · Moderately reduced right ventricular systolic function.  · Severe biatrial enlargement.  · Moderate mitral regurgitation.  · Moderate tricuspid regurgitation.  · The estimated PA systolic pressure is 40 mm Hg  · Elevated central venous pressure (15 mm Hg).  · Atrial fibrillation observed.      Imaging reviewed  Imaging Results          CT Chest Without Contrast (No Result on File)                X-Ray Chest AP Portable (Final result)  Result time 12/24/19 19:09:07    Final result by  Zeb Hirsch MD (12/24/19 19:09:07)                 Impression:      Masslike opacity overlying the right lower lung zone, similar to prior exam.  Right pleural fluid not excluded.  Chest CT recommended for further evaluation of right lung masslike opacity.    Patchy bilateral pulmonary opacities, similar to prior exam.    Cardiomegaly.    Electronically signed by resident: Cristofer Reddy  Date:    12/24/2019  Time:    18:48    Electronically signed by: Zeb Hirsch MD  Date:    12/24/2019  Time:    19:09             Narrative:    EXAMINATION:  XR CHEST AP PORTABLE    CLINICAL HISTORY:  CHF;    TECHNIQUE:  Single frontal view of the chest was performed.    COMPARISON:  Chest radiograph 12/03/2019    FINDINGS:  Cardiac leads overlie the chest wall.    Redemonstration of extensive bilateral patchy opacities, more prominent on the right with a focal area of masslike opacity in the right lower lung zone.  No significant change from prior exam.  Left costophrenic angle is visible and right sided pleural fluid not excluded noting presence of opacification.  No pneumothorax.    The cardiac silhouette is enlarged.  Hilar and mediastinal contours are unchanged.    Bones are intact.                                  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 [I13.0]  Yes    Elevated alkaline phosphatase level [R74.8]  Yes    Coronary artery disease [I25.10]  Yes     Chronic    Stage 3 chronic kidney disease [N18.3]  Yes    Subtherapeutic international normalized ratio (INR) [R79.1]  Yes    Personal history of cerebral embolism [Z86.79]  Not Applicable     Personal history of venous thrombosis and embolism [Z86.718]  Not Applicable    Personal history of MI (myocardial infarction) [I25.2]  Not Applicable    Essential hypertension [I10]  Yes     Chronic    Atrial fibrillation, chronic [I48.20]  Yes     Chronic    Chronic anticoagulation [Z79.01]  Not Applicable     Chronic    Cardiomyopathy [I42.9]  Yes     Chronic      Resolved Hospital Problems   No resolved problems to display.      Acute on Chronic Combined Systolic and Diastolic Heart Failure  Cardiomyopathy  · CHF Pathway initiated  · Last 2D echo Nov 2019 with EF 25%  ·  and CXR with pulmonary edema and cardiomegaly  · Started on Lasix gtt, increased to 40 mg/hr. Failed transition to high dose IV Lasix pushes. Back on Lasix gtt  · BID Metolazone 10 mg per Nephro recs  · Cont  gtt 2.5, which will be continued at home. Will need long-term IV placement when he nears discharge. PICC placement may be an issue given his renal failure   · Cardiology Co-mgmt consulted. Recommend Nephro consult for temporary HD for volume removal due to diuretic resistance  · Goal diuresis 2-3L/day.  Home diuretic dose:  Lasix 80mg PO BID  · BID BMP (with morning labs and at 4pm)  · Strict I&Os with daily weights.  · Hold BB, okay to c/w BiDil     JEAN-PIERRE on CKD3  · Baseline 1.8  · Cardiorenal. Not improving as patient is not diuresing well  · Nephrology consult as above - needs temporary HD for volume removal    Chronic AFib  Long Term Use of Anticoagulants  Subtherapeutic INR  · Chronic issue  · HR in 100-120's  · Continue Warfarin at increased dose  · Pharmacy consulted for dosing management     Essential HTN  · Chronic and stable  · Continue Bidil BID     CAD  History of MI  · Chronic and stable  · Continue Aspirin 81mg PO daily  · Continue Bidil BID     Anemia  · Received 5 units PRBCs on prior hospital admission  · Continue PO iron  · Type and screen obtained  · Monitor for bleeding/need for transfusion  · Transfused 1U  pRBC after episode of epistaxis noted. Refusing further transfusions at this time     History of VTE  History of Stroke  · Chronic and stable  · Continue Aspirin 81mg PO daily  · Continue Warfarin as above      Elevated Alk Phos  · Chronic and stable  · Monitor     Benign Hypertensive Heart and Kidney Disease with HF and CKD  · As above     Abnormal CT  · Needs CT chest when able to lie flat or outpt to evaluate masslike opacity seen on CXR     Diet:  Low Sodium, 1.2L fluid restriction  VTE PPx:  Warfarin  Goals of Care:  Full     High Risk Conditions:   Patient is currently on drug therapy requiring intensive monitoring for toxicity: Lasix gtt    Anticipated discharge date and disposition:   Pending diuresis, Nephro recs. Home hospice on discharge. Pt w/ very poor insight into disease process and will likely continue to be readmitted to the hospital    Antoinette Goins MD  [facility] Medicine

## 2020-01-08 NOTE — PLAN OF CARE
Pt free of falls/trauma/injuries.  Denies c/o SOB, CP, or discomfort.  Generalized skin remains CDI; generalized edema noted.  Pt being diuresed with lasix gtt; diuresing well.  Lasix gtt increased to 40mg/20ml. Wt remains stable.  Electrolytes replaced as ordered.   remains at 2,5mcg. PICC line not placed d/t not being sure about sending pt home on . Diuril x1 given, along with metolazone. Oxy increased to 15mg for pain management. Neph suggesting that dialysis may not be beneficial d/t talks with hospice and palliative care. VSS. Fall bundle in place. POC explained, no questions at this time. Pt tolerating plan of care.

## 2020-01-08 NOTE — ASSESSMENT & PLAN NOTE
Plan/Recommendation:  - Continue current regimen with lasix infusion at 40 mg/hr  - Begin metolazone 10mg BID   - Would recommend continued medical management   - Continue to monitor strict In/outs, daily weights  - Will continue to follow pending discussions between patient and palliative care/hospice       Low threshold to dialyze emergently if necessary- currently does not need emergent dialysis

## 2020-01-08 NOTE — ASSESSMENT & PLAN NOTE
Palliative care and hospice currently following   - Ongoing goals of care discussion- states he would be okay with dialysis while inpatient, but does not want to pursue dialysis outpatient, discussed trialysis line placement- patient states he needs more time to consider

## 2020-01-08 NOTE — PLAN OF CARE
Pt free of falls/trauma/injuries.  Denies c/o SOB; O2Sats remain stable on room air.  Incentive spirometry encouraged throughout shift. Generalized skin remains CDI; 4+ edema noted to BLEs.  Incisions remain ANAND, CDI.  Pt being diuresed with IV lasix; diuresing well.   Wt  remains stable.  Electrolytes replaced as ordered.  PT/OT discharged Pt today d/t multiple refusal to participate.Pt has sat in room for the majority of the day with the lights off and a blanket over his head. Did refuse some medications today and refused V/S to be taken as well.   Pt gets irritated when you try to talk to him. He did let us weigh him today which was unchanged. Pt tolerating plan of care.

## 2020-01-08 NOTE — ASSESSMENT & PLAN NOTE
25 yo male with HFrEF (25%), CKD 3, and multiple hospital admissions for heart failure exacerbations presenting this admission for acute on chronic heart failure. Despite aggressive diuresis with Lasix 120mg TID, decreased urinary output. He was later transitioned to lasix gtt at 30 mg/hr with multiple doses of diuril without improvement in UPO. Nephrology consult for volume overload refractory to diuresis.    Plan/Recommendation:  - Continue current regimen with lasix infusion at 40 mg/hr  - Would add metolazone 10mg qd, may titrate to BID depending on urinary output  - Would recommend continued medical management   - Continue to monitor strict In/outs, daily weights  - Will continue to follow pending discussions between patient and palliative care/hospice  - Patient states he needs more time to consider trialysis placement for potential HD- will follow closely and touch base with him

## 2020-01-08 NOTE — PT/OT/SLP PROGRESS
Physical Therapy      Patient Name:  Hamlet Terrell   MRN:  [id number]    Patient not seen today secondary to Patient unwilling to participate. Attempted this PM, but pt declining therapy at this time. Pt found with blanket over his head, not willing to remove when asked. Discussed multiple refusals with pt and plan to discontinue PT orders because of this. Pt with minimal engagement but verbalized understanding. Pt instructed to contact medical team when he is willing to actively participate in acute therapy services. MD Goins notified following. PT orders discontinued at this time. Please re-consult as needed.     Eboni Pittman, PT, DPT   1/8/2020  [phone]

## 2020-01-08 NOTE — ASSESSMENT & PLAN NOTE
- JEAN-PIERRE on CKD III 2/2 cardiorenal syndrome   - Creatinine up trending since admission, baseline 1.6-1.9, currently 3.2

## 2020-01-08 NOTE — PLAN OF CARE
Goals not achieved. However, pt discharged from acute PT services at this time 2* continued refusals.    Eboni Pittman, PT, DPT   2020  [phone]  Problem: Physical Therapy Goal  Goal: Physical Therapy Goal  Description  Goals to be met by: 2020     Patient will increase functional independence with mobility by performin. Supine to sit with Supervision  2. Sit to stand transfer with Supervision  3. Gait  x 50 feet with Supervision using AD as needed.   4. Ascend/descend 3 stair without Handrails Contact Guard Assistance.   5. .Lower extremity exercise program x15 reps, with supervision, in order to increase LE strength and (I) with functional mobility.      Outcome: Met

## 2020-01-08 NOTE — PROGRESS NOTES
Pt actively coughing up blood. Recent H/H 6.9/23.9 and INR 2.8. MD Medrano notified. Ordered to put in a type and screen and to notify him once labs come back. Will continue to monitor.

## 2020-01-08 NOTE — SUBJECTIVE & OBJECTIVE
Interval History: Patient has remained on  gtt at 2.5 mcg/kg since admission. He initially received several doses of IV Diuril but diuresis was limited. He responded well after Lasix gtt was increased to 30 mg/hr. Lasix gtt was stopped and patient was started on Lasix IVP on 1/3 but he began to worsen symptomatically. He received IV Diuril on 1/4 and was started back on Lasix gtt on 1/5. He had an episode of epitaxis on 1/4 and required 1 unit PRBC transfusion after Hgb dropped to 6.8. He continues to receive Diuril augmentation.    Past Medical History:   Diagnosis Date    Anticoagulant long-term use     Cardiomegaly     Chronic combined systolic and diastolic congestive heart failure     Coronary artery disease     Heart attack 2006    Hypertension     Hyperthyroidism, subclinical 1/2/2013    MI (myocardial infarction) 9/22/2013    MI in 2009      Paroxysmal atrial fibrillation     PE (pulmonary embolism) 1/1/2013    IN 2010     S/P ablation of atrial flutter 2008    Stroke 2009    no residual weaknesses       Past Surgical History:   Procedure Laterality Date    COLONOSCOPY N/A 12/2/2019    Procedure: COLONOSCOPY;  Surgeon: Scott Rosario MD;  Location: [facility] ([address] [place] [address]);  Service: Endoscopy;  Laterality: N/A;    ESOPHAGOGASTRODUODENOSCOPY N/A 12/2/2019    Procedure: EGD (ESOPHAGOGASTRODUODENOSCOPY);  Surgeon: Scott Rosario MD;  Location: [facility] ([address] [place] [address]);  Service: Endoscopy;  Laterality: N/A;    RADIOFREQUENCY ABLATION  01/08/2008    for atrial flutter       Review of patient's allergies indicates:   Allergen Reactions    Acetaminophen      Itching    Oxycodone-acetaminophen      Other reaction(s): Itching    Ace inhibitors Other (See Comments)     cough       Medications:  Continuous Infusions:   DOBUTamine 2.5 mcg/kg/min (01/06/20 4231)    furosemide (LASIX) 2 mg/mL infusion (non-titrating) 40 mg/hr (01/08/20 3869)     Scheduled Meds:   aspirin  81 mg Oral Daily    ferrous  sulfate  325 mg Oral Daily    isosorbide-hydrALAZINE 20-37.5 mg  1 tablet Oral BID    lidocaine  1 patch Transdermal Q24H    methyl salicylate-menthol 15-10%   Topical (Top) TID    warfarin  7.5 mg Oral Daily     PRN Meds:sodium chloride, sodium chloride, acetaminophen, Dextrose 10% Bolus, Dextrose 10% Bolus, diphenhydrAMINE, glucagon (human recombinant), glucose, glucose, melatonin, methocarbamol, morphine, ondansetron, oxyCODONE, promethazine (PHENERGAN) IVPB, senna-docusate 8.6-50 mg, sodium chloride, sodium chloride 0.9%, sodium chloride 0.9%, sodium chloride 0.9%    Family History     Problem Relation (Age of Onset)    Alcohol abuse Father    Hypertension Mother, Father, Sister, Brother    Stroke Mother        Tobacco Use    Smoking status: Never Smoker    Smokeless tobacco: Never Used   Substance and Sexual Activity    Alcohol use: No    Drug use: No    Sexual activity: Never       Review of Systems   Constitutional: Negative for appetite change and fatigue.   HENT: Negative for sore throat and trouble swallowing.    Respiratory: Positive for shortness of breath. Negative for cough and wheezing.    Cardiovascular: Positive for leg swelling. Negative for chest pain.   Gastrointestinal: Positive for constipation. Negative for diarrhea, nausea and vomiting.   Genitourinary: Negative for dysuria and hematuria.   Musculoskeletal: Positive for arthralgias (hips) and back pain. Negative for neck pain.   Skin: Negative for rash and wound.   Neurological: Negative for dizziness and light-headedness.   Psychiatric/Behavioral: Negative for confusion and dysphoric mood. The patient is not nervous/anxious.      Objective:     Vital Signs (Most Recent):  Temp: 97.7 °F (36.5 °C) (01/08/20 0433)  Pulse: 65 (01/08/20 0433)  Resp: 16 (01/08/20 0433)  BP: (!) 121/58 (01/08/20 0433)  SpO2: 98 % (01/08/20 0433) Vital Signs (24h Range):  Temp:  [97.3 °F (36.3 °C)-98.4 °F (36.9 °C)] 97.7 °F (36.5 °C)  Pulse:  []  65  Resp:  [16-18] 16  SpO2:  [90 %-98 %] 98 %  BP: (102-136)/(55-71) 121/58     Weight: 128.4 kg (283 lb 1.1 oz)  Body mass index is 41.8 kg/m².    Review of Symptoms  Symptom Assessment (ESAS 0-10 scale)   ESAS 0 1 2 3 4 5 6 7 8 9 10   Pain    X          Dyspnea  X            Anxiety X             Nausea X             Depression  X             Anorexia X             Fatigue X             Insomnia X             Restlessness  X             Agitation X             CAM / Delirium _X_ --  ___+   Constipation     __ --  _X_+   Diarrhea           _X_ --  ___+  Bowel Management Plan (BMP): Yes    Comments: Senna-Docusate ordered BID PRN    Pain Assessment: Location: bilateral hips, legs and back  Quality: shooting  Quantity: 3/10 in intensity  Aggravating factors: worsens with movement or activity  Alleviating factors: improves with Morphine      OME in 24 hours: 55    Performance Status: 50    Physical Exam   Constitutional: He is oriented to person, place, and time. He appears well-developed and well-nourished.   HENT:   Head: Normocephalic and atraumatic.   Eyes: Conjunctivae and EOM are normal.   Neck: Normal range of motion. Neck supple.   Cardiovascular: Regular rhythm. Tachycardia present.   Pulmonary/Chest: Effort normal. No respiratory distress.   Abdominal: Soft. There is no tenderness.   Musculoskeletal: Normal range of motion. He exhibits edema.   Neurological: He is alert and oriented to person, place, and time.   Skin: Skin is warm and dry.   Psychiatric: His behavior is normal. Thought content normal. Cognition and memory are normal.   Irritable at times   Vitals reviewed.      Significant Labs: All pertinent labs within the past 24 hours have been reviewed.  CBC:   Recent Labs   Lab 01/08/20 0314   WBC 8.23   HGB 6.8*   HCT 23.7*   MCV 88        BMP:  Recent Labs   Lab 01/08/20 0314   *      K 3.9   CL 89*   CO2 34*   *   CREATININE 3.2*   CALCIUM 9.4   MG 2.6     LFT:  Lab  Results   Component Value Date    AST 23 2020     (H) 2019    ALKPHOS 222 (H) 2020    BILITOT 1.6 (H) 2020     Albumin:   Albumin   Date Value Ref Range Status   2020 3.1 (L) 3.5 - 5.2 g/dL Final     Protein:   Total Protein   Date Value Ref Range Status   2020 8.4 6.0 - 8.4 g/dL Final     Lactic acid:   Lab Results   Component Value Date    LACTATE 1.0 2019    LACTATE 1.0 2019       Significant Imaging: I have reviewed all pertinent imaging results/findings within the past 24 hours.    Advance Care Planning   Advanced Directives::  Living Will: No  LaPOST: No  Do Not Resuscitate Status: No  Medical Power of : No. Patient identifies brother Kris Canada ([phone]) as his surrogate decision maker.    Decision-Making Capacity: Patient answered questions       Living Arrangements: Lives alone    Psychosocial/Cultural:  Patient is unmarried and has no children. His parents are . He has 5 siblings and a lot of cousins and friends. He lives alone in [place] but says his brother Kris lives right next to him. 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 He says that his goal is to get healthy. He worries about dying.    Spiritual:     F- Melina and Belief: does not identify with a particular Orthodoxy but prays to God    I - Importance: somewhat important  .  C - Community: does not belong to a Sikhism    A - Address in Care:  visits

## 2020-01-08 NOTE — PT/OT/SLP DISCHARGE
Occupational Therapy Discharge Summary    Hamlet Terrell  MRN: [id number]   Principal Problem: Acute on chronic combined systolic and diastolic congestive heart failure      Patient Discharged from acute Occupational Therapy on 1/8/20. Please refer to prior OT note dated 1/6/20 for functional status. Patient with poor participation in therapy 2* c/o severe pain in B hips and fatigue. Please re-consult when patient presents with decreased pain and agreeable to therapy.    Assessment:     Patient is no longer making progress 2* c/o pain and limited participation since evaluation.     Objective:     GOALS:   Multidisciplinary Problems     Occupational Therapy Goals        Problem: Occupational Therapy Goal    Goal Priority Disciplines Outcome Interventions   Occupational Therapy Goal     OT, PT/OT Ongoing, Progressing    Description:  Goals to be met by 1/13/20:    Patient will increase functional independence with ADLs by performing:    LE Dressing with Supervision.  Grooming while standing with Supervision.  Toileting from toilet with Supervision for hygiene and clothing management.   Toilet transfer to toilet with Supervision.                    Reasons for Discontinuation of Therapy Services  Patient is unable to continue work toward goals because of medical or psychosocial complications.      Plan:     Patient Discharged to: Patient remains in hospital.    Pat Christianson OT  1/8/2020

## 2020-01-08 NOTE — PLAN OF CARE
[facility]  Palliative Care   Follow Up Note:    Patient Name: Hamlet Terrell  MRN: [id number]  Admission Date: 12/24/2019  Hospital Length of Stay: 15 days  Code Status: Full Code   Attending Provider: Antoinette Goins MD  Palliative Care Provider: Vira Davila NP  Primary Care Physician: Carlin Swift MD  Principal Problem:Acute on chronic combined systolic and diastolic congestive heart failure     Visit to bedside. Pt sitting up in chair sleeping, comfortably. Palliative Care  to follow up with pt tomorrow to continue to determine is plan of care.        Brooke Apple LCSW, ACHP-SW

## 2020-01-08 NOTE — ASSESSMENT & PLAN NOTE
"Palliative care consulted for goals of care discussion/advanced care planning for this 53 y/o male being followed by hospital medicine for Acute on Chronic Combined Systolic and Diastolic Heart Failure, Cardiomyopathy, JEAN-PIERRE on CKD3, Chronic AFib, Essential HTN, CAD, Anemia, Elevated Alk Phos, Abnormal CT, History of MI, History of VTE and History of Stroke. Rounded on patient today with ZULMA RDZ, no family members at bedside. [facility] reps and nephrology team both stopped by room during this visit. Patient is alert, oriented, and cooperative with visit but irritable at times. He complains that his IVs need to be changed out and nursing will not do it; per nursing documentation patient has refused IV sticks multiple times. He is agreeable to having PICC placed if possible so he will not need to have so many needle sticks in the future.     Plan/Recommendations:  1. See goals of care discussion below.  2. Palliative care will follow up with patient.    Goals of Care/Advance Care Plannin/7 Rounded on patient today with BERTA Granado LCSW, no family members at bedside. Patient is alert, oriented, and cooperative with visit but irritable at times. Patient met with [facility] reps last week and they stopped by today to see him. He remains undecided about hospice but understands that they can help keep him out the hospital after discharge if that is what he wants. He expresses frustration with being in the hospital but also says that he would be willing to come back to the hospital if needed. He does not like the idea of going home on Dobutamine drip but now says that he would be willing as long as he does not have to roll an IV pole around. He says he will carry a "purse" if needed. Nephrology rounded on patient today and discussed possible need for dialysis. Patient is willing to consider trying dialysis for a "week or so" but is adamantly opposed to long-term dialysis. He is familiar with dialysis " "because his brother is a dialysis patient and says that he can't sit still for that long. He states that he would never want long-term dialysis even if he would die without it. At this time he remains full code.    1/6 Rounded on patient today, no family members at bedside. Patient is alert, oriented, and cooperative with visit. He expresses frustration with being in the hospital and has been intermittently refusing IV sticks, lab draws, and vital signs. Reminded patient that he has the option to enroll in hospice if he prefers to discontinue aggressive treatment and focus on comfort. Patient met with [facility] last week but has not yet made any decisions; he says that he is still thinking about it. Discussed the plan for patient to discharge on  drip but patient now says that he does not want to be on a drip when he gets discharged because he is not "toting no bag around." Patient has been told by primary team that he will likely need  drip for the rest of his life but he states that he is going to "prove them wrong." He says that he was told at age 42 that he would die without a defibrillator and he is still here so he has "proved them wrong" before. He also says that he would like to go to the rehab hospital down the street when he leaves here so he can get stronger. His goal remains to "get healthy" and he wishes to remain full code. He has poor understanding and insight related to his clinical condition and prognosis. Will involve palliative care  for assistance with emotional support and coping skills.    1/3 Conference conducted by this APRN with patient to discuss his current clinical status, goals of care, treatment options, code status, long term expected outcomes and prognostic awareness. After a discussion concerning his values and wishes, patient verbalized limited understanding and insight related to his clinical condition and prognostic awareness. His stated goal is to " ""get healthy." He has been told that he will need to be on  gtt for the rest of his life but still hopes that he can live a long time. He has never thought about his end of life preferences and whether he would prefer to die at home or at the hospital. Discussed home hospice as an option that would allow him to receive care in the home and provide management of symptoms. Patient is not ready to enroll in hospice at this time but is interested in meeting with a hospice rep to get more information on their services.    Patient has never completed a living will or MPOA. His preference for surrogate decision maker would be his brother Kris Canada. Explained to patient that palliative care can assist with completion of MPOA paperwork whenever he is ready. He is currently a full code and not ready to make any changes to his code status.  "

## 2020-01-08 NOTE — SUBJECTIVE & OBJECTIVE
Interval History:     Patient up at bedside this morning. States that he is having a nose bleed that is disturbing him. Assisted patient in packing nose. On attending rounds, nose bleed resolved. Patient spoke with palliative care yesterday and states again today that he would be okay with dialysis short term, but does not want to pursue this outpatient. When discussing central line placement for dialysis, he states that he did not realize this was a component of dialysis and would need more time to think about it. Reiterated same thoughts during attending rounds. Does not appear encephalopathic or uremic at bedside today.     Review of Systems   Constitutional: Negative for chills and fever.   HENT: Negative for congestion and rhinorrhea.    Eyes: Negative for photophobia and visual disturbance.   Respiratory: Positive for shortness of breath. Negative for wheezing.    Cardiovascular: Positive for leg swelling. Negative for chest pain and palpitations.   Gastrointestinal: Negative for nausea and vomiting.   Genitourinary: Positive for difficulty urinating (reduced). Negative for dysuria, frequency and urgency.   Musculoskeletal: Positive for back pain.   Skin: Positive for rash (lower extremities, chronic venous stasis). Negative for pallor.   Neurological: Negative for dizziness and headaches.   Psychiatric/Behavioral: Negative for agitation and behavioral problems.     Objective:     Vital Signs (Most Recent):  Temp: 96.2 °F (35.7 °C) (01/08/20 1111)  Pulse: 94 (01/08/20 1120)  Resp: 18 (01/08/20 1111)  BP: 135/64 (01/08/20 1111)  SpO2: (!) 91 % (01/08/20 1111)  O2 Device (Oxygen Therapy): room air (01/08/20 0800) Vital Signs (24h Range):  Temp:  [96.2 °F (35.7 °C)-98.4 °F (36.9 °C)] 96.2 °F (35.7 °C)  Pulse:  [] 94  Resp:  [16-18] 18  SpO2:  [90 %-98 %] 91 %  BP: (102-138)/(55-75) 135/64     Weight: 128.4 kg (283 lb 1.1 oz) (01/07/20 0500)  Body mass index is 41.8 kg/m².  Body surface area is 2.5 meters  squared.    I/O last 3 completed shifts:  In: 2342.8 [P.O.:1680; I.V.:612.8; IV Piggyback:50]  Out: 2430 [Urine:2430]    Physical Exam   Constitutional: He is oriented to person, place, and time. No distress.   Sick appearing male   HENT:   Head: Normocephalic and atraumatic.   Right Ear: External ear normal.   Left Ear: External ear normal.   Nose: Nose normal.   Eyes: Pupils are equal, round, and reactive to light. EOM are normal. No scleral icterus.   Neck: JVD (to the angle of the mandible) present. No tracheal deviation present.   Cardiovascular: Normal rate, regular rhythm and intact distal pulses. Exam reveals gallop.   Pulses:       Radial pulses are 2+ on the right side, and 2+ on the left side.   Pulmonary/Chest: Effort normal. No stridor. He has no wheezes. He has rales (bibasilar).   Abdominal: Soft. There is no tenderness. There is no guarding.   Musculoskeletal: He exhibits edema and tenderness.   Neurological: He is alert and oriented to person, place, and time.   Negative for asterixis, no tremor noted    Skin: Skin is warm and dry. Rash (chronic venous stasis ulceration to the lower extremities bilaterally) noted. He is not diaphoretic.   Psychiatric: He has a normal mood and affect. His behavior is normal.   Nursing note and vitals reviewed.      Significant Labs:  All labs within the past 24 hours have been reviewed.

## 2020-01-08 NOTE — PROGRESS NOTES
[facility]  Nephrology  Progress Note    Patient Name: Hamlet Terrell  MRN: [id number]  Admission Date: 12/24/2019  Hospital Length of Stay: 15 days  Attending Provider: Antoinette Goins MD   Primary Care Physician: Carlin Swift MD  Principal Problem:Acute on chronic combined systolic and diastolic congestive heart failure    Subjective:     HPI: The pt is a 55 yo M with chronic combined HF (EF 25% on 11/27), atrial fibrillation on chronic AC (coumadin), CAD, CVA, and history of PE that presented to the ED with SOB & edema. Nephrology was consulted for assistance with management of cardiorenal syndrome in the setting of JEAN-PIERRE on CKD III now resistive to diuretic management. Baseline Cr appears to be 1.6-1.9. Cr elevated to 3.1 at time of consult. Of note, he was recently admitted to Hospital Medicine from 11/24-12/8 for a CHF exacerbation.  During that admission, he was evaluated by Cardiology who started him on Dobutamine and Diuril.  He was DC'ed home on Lasix 80 mg po BID.  He reports that he was compliant with this regimen, but despite this, he had continued to gain weight, have lower extremity and abdominal swelling, as well as shortness of breath. He was discharged with a weight of 259lbs, and claims that is around his dry weight. He is currently 283 lbs.       Per Dr. Goins:   Patient started on IV diuretics and  infusion for diagnosis of acute on chronic combined heart failure. His weight was stagnant and UOP tapered, so he was transitioned to IVP lasix for IV lasix gtt as this was actually effective during prior admission under guidance from co-management cardiology. However, the patient actually worsened symptomatically and TBili increased along with Cr and weight increase. The patient is now started back on lasix infusion with diuril augmentation. The patient's course is also c/b epistaxis, possibly related to his decompensated picture. He has required pRBC transfusion. He refuses to  apply pressure to the bridge of his nose as he says he cannot breathe through his mouth.  The patient's course is also c/b fever, likely due to pRBC transfusion.     Cardiology consulted for diuretic resistance. Despite two-week hospital stay thus far, he is only down 3 lbs. Nephrology consulted for acute renal failure and consideration of temporary HD for volume removal. Pt continues to exhibit poor insight into his disease. His wishes (to get healthy, ride his bike, walk to the store, return to the hospital if needed) is not on par with hospice. He reported similar feelings to Palliative Care yesterday. Discussed with Palliative Care provider. He is however now amenable for home dobutamine. PICC placement will likely be an issue given his current renal failure. Hb 6.9 secondary to renal failure; pt would like to hold off on transfusion today.    Interval History:     Patient up at bedside this morning. States that he is having a nose bleed that is disturbing him. Assisted patient in packing nose. On attending rounds, nose bleed resolved. Patient spoke with palliative care yesterday and states again today that he would be okay with dialysis short term, but does not want to pursue this outpatient. When discussing central line placement for dialysis, he states that he did not realize this was a component of dialysis and would need more time to think about it. Reiterated same thoughts during attending rounds. Does not appear encephalopathic or uremic at bedside today.     Review of Systems   Constitutional: Negative for chills and fever.   HENT: Negative for congestion and rhinorrhea.    Eyes: Negative for photophobia and visual disturbance.   Respiratory: Positive for shortness of breath. Negative for wheezing.    Cardiovascular: Positive for leg swelling. Negative for chest pain and palpitations.   Gastrointestinal: Negative for nausea and vomiting.   Genitourinary: Positive for difficulty urinating (reduced).  Negative for dysuria, frequency and urgency.   Musculoskeletal: Positive for back pain.   Skin: Positive for rash (lower extremities, chronic venous stasis). Negative for pallor.   Neurological: Negative for dizziness and headaches.   Psychiatric/Behavioral: Negative for agitation and behavioral problems.     Objective:     Vital Signs (Most Recent):  Temp: 96.2 °F (35.7 °C) (01/08/20 1111)  Pulse: 94 (01/08/20 1120)  Resp: 18 (01/08/20 1111)  BP: 135/64 (01/08/20 1111)  SpO2: (!) 91 % (01/08/20 1111)  O2 Device (Oxygen Therapy): room air (01/08/20 0800) Vital Signs (24h Range):  Temp:  [96.2 °F (35.7 °C)-98.4 °F (36.9 °C)] 96.2 °F (35.7 °C)  Pulse:  [] 94  Resp:  [16-18] 18  SpO2:  [90 %-98 %] 91 %  BP: (102-138)/(55-75) 135/64     Weight: 128.4 kg (283 lb 1.1 oz) (01/07/20 0500)  Body mass index is 41.8 kg/m².  Body surface area is 2.5 meters squared.    I/O last 3 completed shifts:  In: 2342.8 [P.O.:1680; I.V.:612.8; IV Piggyback:50]  Out: 2430 [Urine:2430]    Physical Exam   Constitutional: He is oriented to person, place, and time. No distress.   Sick appearing male   HENT:   Head: Normocephalic and atraumatic.   Right Ear: External ear normal.   Left Ear: External ear normal.   Nose: Nose normal.   Eyes: Pupils are equal, round, and reactive to light. EOM are normal. No scleral icterus.   Neck: JVD (to the angle of the mandible) present. No tracheal deviation present.   Cardiovascular: Normal rate, regular rhythm and intact distal pulses. Exam reveals gallop.   Pulses:       Radial pulses are 2+ on the right side, and 2+ on the left side.   Pulmonary/Chest: Effort normal. No stridor. He has no wheezes. He has rales (bibasilar).   Abdominal: Soft. There is no tenderness. There is no guarding.   Musculoskeletal: He exhibits edema and tenderness.   Neurological: He is alert and oriented to person, place, and time.   Negative for asterixis, no tremor noted    Skin: Skin is warm and dry. Rash (chronic venous  stasis ulceration to the lower extremities bilaterally) noted. He is not diaphoretic.   Psychiatric: He has a normal mood and affect. His behavior is normal.   Nursing note and vitals reviewed.      Significant Labs:  All labs within the past 24 hours have been reviewed.        Assessment/Plan:     * Acute on chronic combined systolic and diastolic congestive heart failure  27 yo male with HFrEF (25%), CKD 3, and multiple hospital admissions for heart failure exacerbations presenting this admission for acute on chronic heart failure. Despite aggressive diuresis with Lasix 120mg TID, decreased urinary output. He was later transitioned to lasix gtt at 30 mg/hr with multiple doses of diuril without improvement in UPO. Nephrology consult for volume overload refractory to diuresis.    Plan/Recommendation:  - Continue current regimen with lasix infusion at 40 mg/hr  - Would add metolazone 10mg qd, may titrate to BID depending on urinary output  - Would recommend continued medical management   - Continue to monitor strict In/outs, daily weights  - Will continue to follow pending discussions between patient and palliative care/hospice  - Patient states he needs more time to consider trialysis placement for potential HD- will follow closely and touch base with him       Cardiorenal syndrome  Plan/Recommendation:  - Continue current regimen with lasix infusion at 40 mg/hr  - Begin metolazone 10mg BID   - Would recommend continued medical management   - Continue to monitor strict In/outs, daily weights  - Will continue to follow pending discussions between patient and palliative care/hospice       Low threshold to dialyze emergently if necessary- currently does not need emergent dialysis     Palliative care encounter  Palliative care and hospice currently following   - Ongoing goals of care discussion- states he would be okay with dialysis while inpatient, but does not want to pursue dialysis outpatient, discussed trialysis  line placement- patient states he needs more time to consider      Stage 3 chronic kidney disease  - JEAN-PIERRE on CKD III 2/2 cardiorenal syndrome   - Creatinine up trending since admission, baseline 1.6-1.9, currently 3.2          Thank you for your consult. I will follow-up with patient. Please contact us if you have any additional questions.    Harris Zarco,   Nephrology  [facility]

## 2020-01-08 NOTE — CHAPLAIN
"Brief visit w/pt today, less talkative and seems more in pain/uncomfortable-- grimacing; shared with him to adhere to the nurses and docs to help with his condition (we've built enough rapport now I can say that and I see the notes of his refusal of some care). I asked if I could do anything for him, after we sat in silence for a while and he said "pray for me" (he's listed as "no Scientologist"). We held hands and I prayed for him.  He definitely needs hygiene care and new gown (if he will accept it!). I will keep rounding on him and praying with him.   "

## 2020-01-08 NOTE — PROGRESS NOTES
Pt had two 8 beat runs of V-tach. Asymptomatic. K 3.9 and Mg 2.5. Lasix was increased to 40mg/20ml. MD Caruso under [facility] notified. Will continue to monitor.

## 2020-01-09 LAB
ALBUMIN SERPL BCP-MCNC: 3.1 G/DL (ref 3.5–5.2)
ALP SERPL-CCNC: 201 U/L (ref 55–135)
ALT SERPL W/O P-5'-P-CCNC: 14 U/L (ref 10–44)
ANION GAP SERPL CALC-SCNC: 17 MMOL/L (ref 8–16)
AST SERPL-CCNC: 28 U/L (ref 10–40)
BASOPHILS # BLD AUTO: 0 K/UL (ref 0–0.2)
BASOPHILS NFR BLD: 0 % (ref 0–1.9)
BILIRUB SERPL-MCNC: 1.6 MG/DL (ref 0.1–1)
BUN SERPL-MCNC: 112 MG/DL (ref 6–20)
CALCIUM SERPL-MCNC: 9.7 MG/DL (ref 8.7–10.5)
CHLORIDE SERPL-SCNC: 89 MMOL/L (ref 95–110)
CO2 SERPL-SCNC: 33 MMOL/L (ref 23–29)
CREAT SERPL-MCNC: 3 MG/DL (ref 0.5–1.4)
DIFFERENTIAL METHOD: ABNORMAL
EOSINOPHIL # BLD AUTO: 0 K/UL (ref 0–0.5)
EOSINOPHIL NFR BLD: 0.1 % (ref 0–8)
ERYTHROCYTE [DISTWIDTH] IN BLOOD BY AUTOMATED COUNT: 22.5 % (ref 11.5–14.5)
EST. GFR  (AFRICAN AMERICAN): 26.2 ML/MIN/1.73 M^2
EST. GFR  (NON AFRICAN AMERICAN): 22.7 ML/MIN/1.73 M^2
GLUCOSE SERPL-MCNC: 138 MG/DL (ref 70–110)
HCT VFR BLD AUTO: 22.1 % (ref 40–54)
HGB BLD-MCNC: 6.4 G/DL (ref 14–18)
IMM GRANULOCYTES # BLD AUTO: 0.04 K/UL (ref 0–0.04)
IMM GRANULOCYTES NFR BLD AUTO: 0.5 % (ref 0–0.5)
INR PPP: 2.7 (ref 0.8–1.2)
LYMPHOCYTES # BLD AUTO: 0.3 K/UL (ref 1–4.8)
LYMPHOCYTES NFR BLD: 4.3 % (ref 18–48)
MAGNESIUM SERPL-MCNC: 2.6 MG/DL (ref 1.6–2.6)
MCH RBC QN AUTO: 25.2 PG (ref 27–31)
MCHC RBC AUTO-ENTMCNC: 29 G/DL (ref 32–36)
MCV RBC AUTO: 87 FL (ref 82–98)
MONOCYTES # BLD AUTO: 0.8 K/UL (ref 0.3–1)
MONOCYTES NFR BLD: 10.3 % (ref 4–15)
NEUTROPHILS # BLD AUTO: 6.3 K/UL (ref 1.8–7.7)
NEUTROPHILS NFR BLD: 84.8 % (ref 38–73)
NRBC BLD-RTO: 0 /100 WBC
PHOSPHATE SERPL-MCNC: 4.3 MG/DL (ref 2.7–4.5)
PLATELET # BLD AUTO: 245 K/UL (ref 150–350)
PMV BLD AUTO: 10.3 FL (ref 9.2–12.9)
POTASSIUM SERPL-SCNC: 3.7 MMOL/L (ref 3.5–5.1)
PROT SERPL-MCNC: 8.3 G/DL (ref 6–8.4)
PROTHROMBIN TIME: 25.4 SEC (ref 9–12.5)
RBC # BLD AUTO: 2.54 M/UL (ref 4.6–6.2)
SODIUM SERPL-SCNC: 139 MMOL/L (ref 136–145)
WBC # BLD AUTO: 7.48 K/UL (ref 3.9–12.7)

## 2020-01-09 PROCEDURE — 25000003 PHARM REV CODE 250: Performed by: HOSPITALIST

## 2020-01-09 PROCEDURE — 36415 COLL VENOUS BLD VENIPUNCTURE: CPT

## 2020-01-09 PROCEDURE — 99232 SBSQ HOSP IP/OBS MODERATE 35: CPT | Mod: ,,, | Performed by: NURSE PRACTITIONER

## 2020-01-09 PROCEDURE — 99233 PR SUBSEQUENT HOSPITAL CARE,LEVL III: ICD-10-PCS | Mod: ,,, | Performed by: HOSPITALIST

## 2020-01-09 PROCEDURE — 80053 COMPREHEN METABOLIC PANEL: CPT

## 2020-01-09 PROCEDURE — 63600175 PHARM REV CODE 636 W HCPCS: Performed by: HOSPITALIST

## 2020-01-09 PROCEDURE — 99233 SBSQ HOSP IP/OBS HIGH 50: CPT | Mod: ,,, | Performed by: INTERNAL MEDICINE

## 2020-01-09 PROCEDURE — 99233 PR SUBSEQUENT HOSPITAL CARE,LEVL III: ICD-10-PCS | Mod: ,,, | Performed by: INTERNAL MEDICINE

## 2020-01-09 PROCEDURE — 85025 COMPLETE CBC W/AUTO DIFF WBC: CPT

## 2020-01-09 PROCEDURE — 99232 PR SUBSEQUENT HOSPITAL CARE,LEVL II: ICD-10-PCS | Mod: ,,, | Performed by: NURSE PRACTITIONER

## 2020-01-09 PROCEDURE — 85610 PROTHROMBIN TIME: CPT

## 2020-01-09 PROCEDURE — 99233 SBSQ HOSP IP/OBS HIGH 50: CPT | Mod: ,,, | Performed by: HOSPITALIST

## 2020-01-09 PROCEDURE — 84100 ASSAY OF PHOSPHORUS: CPT

## 2020-01-09 PROCEDURE — 83735 ASSAY OF MAGNESIUM: CPT

## 2020-01-09 PROCEDURE — 20600001 HC STEP DOWN PRIVATE ROOM

## 2020-01-09 RX ORDER — POTASSIUM CHLORIDE 20 MEQ/15ML
20 SOLUTION ORAL
Status: COMPLETED | OUTPATIENT
Start: 2020-01-09 | End: 2020-01-09

## 2020-01-09 RX ADMIN — WARFARIN SODIUM 7.5 MG: 7.5 TABLET ORAL at 04:01

## 2020-01-09 RX ADMIN — POTASSIUM CHLORIDE 20 MEQ: 20 SOLUTION ORAL at 11:01

## 2020-01-09 RX ADMIN — MORPHINE SULFATE 4 MG: 2 INJECTION, SOLUTION INTRAMUSCULAR; INTRAVENOUS at 09:01

## 2020-01-09 RX ADMIN — ASPIRIN 81 MG: 81 TABLET, COATED ORAL at 08:01

## 2020-01-09 RX ADMIN — FUROSEMIDE 40 MG/HR: 10 INJECTION, SOLUTION INTRAMUSCULAR; INTRAVENOUS at 09:01

## 2020-01-09 RX ADMIN — PREDNISONE 40 MG: 20 TABLET ORAL at 08:01

## 2020-01-09 RX ADMIN — FUROSEMIDE 40 MG/HR: 10 INJECTION, SOLUTION INTRAMUSCULAR; INTRAVENOUS at 02:01

## 2020-01-09 RX ADMIN — METOLAZONE 10 MG: 10 TABLET ORAL at 09:01

## 2020-01-09 RX ADMIN — HYDRALAZINE HYDROCHLORIDE AND ISOSORBIDE DINITRATE 1 TABLET: 37.5; 2 TABLET, FILM COATED ORAL at 08:01

## 2020-01-09 RX ADMIN — HYDRALAZINE HYDROCHLORIDE AND ISOSORBIDE DINITRATE 1 TABLET: 37.5; 2 TABLET, FILM COATED ORAL at 09:01

## 2020-01-09 RX ADMIN — FUROSEMIDE 40 MG/HR: 10 INJECTION, SOLUTION INTRAMUSCULAR; INTRAVENOUS at 03:01

## 2020-01-09 RX ADMIN — FERROUS SULFATE TAB EC 325 MG (65 MG FE EQUIVALENT) 325 MG: 325 (65 FE) TABLET DELAYED RESPONSE at 08:01

## 2020-01-09 RX ADMIN — FUROSEMIDE 40 MG/HR: 10 INJECTION, SOLUTION INTRAMUSCULAR; INTRAVENOUS at 08:01

## 2020-01-09 RX ADMIN — OXYCODONE HYDROCHLORIDE 15 MG: 10 TABLET ORAL at 08:01

## 2020-01-09 RX ADMIN — MENTHOL, METHYL SALICYLATE: 10; 15 CREAM TOPICAL at 08:01

## 2020-01-09 RX ADMIN — METOLAZONE 10 MG: 10 TABLET ORAL at 08:01

## 2020-01-09 RX ADMIN — MENTHOL, METHYL SALICYLATE: 10; 15 CREAM TOPICAL at 09:01

## 2020-01-09 RX ADMIN — MORPHINE SULFATE 4 MG: 2 INJECTION, SOLUTION INTRAMUSCULAR; INTRAVENOUS at 04:01

## 2020-01-09 RX ADMIN — DOBUTAMINE IN DEXTROSE 2.5 MCG/KG/MIN: 200 INJECTION, SOLUTION INTRAVENOUS at 03:01

## 2020-01-09 RX ADMIN — OXYCODONE HYDROCHLORIDE 15 MG: 10 TABLET ORAL at 11:01

## 2020-01-09 NOTE — PLAN OF CARE
Pt free of falls/trauma/injuries.  Denies c/o SOB; O2Sats remain stable on room air.  Incentive spirometry encouraged throughout shift. Generalized skin remains CDI; with the exception of  4+ edema noted to BLEs with blisters. Some are slightly weeping. No nose bleed reported today. Pt being diuresed with IV lasix; diuresing well.  Wt  remains stable.  Electrolytes replaced as ordered. Pt did get up and walk to the door today and back to chair. Encouraged him to do that multiple times a day to start with but to not get up on his own to. Pt has taken his medications well today and been more interactive then yesterday. Did educate the pt pt that the more he gets up and moves around then the easier it will get for him and help to reduce the pain in his left hip area. He did let us weigh him today which was unchanged. Pt tolerating plan of care.

## 2020-01-09 NOTE — PLAN OF CARE
Pt free of falls/trauma/injuries.  Denies c/o SOB, CP, or discomfort.  Generalized skin remains CDI; generalized edema noted.  Pt being diuresed with lasix gtt; diuresing well. Wt remains stable.  Electrolytes replaced as ordered. PT/OT d/c'd the patient d/t him refusing treatment. Needs temp HD to remove volume per Neph; pt wants to think about it. Remains on  at 2.5mcg; needs to go home on . VSS. Fall bundle in place. POC explained, no questions at this time. Pt tolerating plan of care.

## 2020-01-09 NOTE — SUBJECTIVE & OBJECTIVE
Interval History: Patient has remained on  gtt at 2.5 mcg/kg since admission. He initially received several doses of IV Diuril but diuresis was limited. He responded well after Lasix gtt was increased to 30 mg/hr. Lasix gtt was stopped and patient was started on Lasix IVP on 1/3 but he began to worsen symptomatically. He received IV Diuril on 1/4 and was started back on Lasix gtt on 1/5. He had an episode of epitaxis on 1/4 and required 1 unit PRBC transfusion after Hgb dropped to 6.8. He again received Diuril on 1/6 and 1/7. Nephrology has been following patient for possible short term dialysis but patient has not yet agreed.    Past Medical History:   Diagnosis Date    Anticoagulant long-term use     Cardiomegaly     Chronic combined systolic and diastolic congestive heart failure     Coronary artery disease     Heart attack 2006    Hypertension     Hyperthyroidism, subclinical 1/2/2013    MI (myocardial infarction) 9/22/2013    MI in 2009      Paroxysmal atrial fibrillation     PE (pulmonary embolism) 1/1/2013    IN 2010     S/P ablation of atrial flutter 2008    Stroke 2009    no residual weaknesses       Past Surgical History:   Procedure Laterality Date    COLONOSCOPY N/A 12/2/2019    Procedure: COLONOSCOPY;  Surgeon: Scott Rosario MD;  Location: [facility] ([address] [place] [address]);  Service: Endoscopy;  Laterality: N/A;    ESOPHAGOGASTRODUODENOSCOPY N/A 12/2/2019    Procedure: EGD (ESOPHAGOGASTRODUODENOSCOPY);  Surgeon: Scott Rosario MD;  Location: [address]);  Service: Endoscopy;  Laterality: N/A;    RADIOFREQUENCY ABLATION  01/08/2008    for atrial flutter       Review of patient's allergies indicates:   Allergen Reactions    Acetaminophen      Itching    Oxycodone-acetaminophen      Other reaction(s): Itching    Ace inhibitors Other (See Comments)     cough       Medications:  Continuous Infusions:   DOBUTamine 2.5 mcg/kg/min (01/09/20 1503)    furosemide (LASIX) 2 mg/mL infusion  (non-titrating) 40 mg/hr (01/09/20 1500)     Scheduled Meds:   aspirin  81 mg Oral Daily    ferrous sulfate  325 mg Oral Daily    isosorbide-hydrALAZINE 20-37.5 mg  1 tablet Oral BID    lidocaine  1 patch Transdermal Q24H    methyl salicylate-menthol 15-10%   Topical (Top) TID    metOLazone  10 mg Oral BID    predniSONE  40 mg Oral Daily    warfarin  7.5 mg Oral Daily     PRN Meds:sodium chloride, sodium chloride, acetaminophen, Dextrose 10% Bolus, Dextrose 10% Bolus, diphenhydrAMINE, glucagon (human recombinant), glucose, glucose, melatonin, methocarbamol, morphine, ondansetron, oxyCODONE, oxymetazoline, promethazine (PHENERGAN) IVPB, senna-docusate 8.6-50 mg, sodium chloride, sodium chloride 0.9%, sodium chloride 0.9%, sodium chloride 0.9%    Family History     Problem Relation (Age of Onset)    Alcohol abuse Father    Hypertension Mother, Father, Sister, Brother    Stroke Mother        Tobacco Use    Smoking status: Never Smoker    Smokeless tobacco: Never Used   Substance and Sexual Activity    Alcohol use: No    Drug use: No    Sexual activity: Never       Review of Systems   Constitutional: Negative for appetite change and fatigue.   HENT: Negative for sore throat and trouble swallowing.    Respiratory: Positive for shortness of breath. Negative for cough and wheezing.    Cardiovascular: Positive for leg swelling. Negative for chest pain.   Gastrointestinal: Positive for constipation. Negative for diarrhea, nausea and vomiting.   Genitourinary: Negative for dysuria and hematuria.   Musculoskeletal: Positive for arthralgias (hips) and back pain. Negative for neck pain.   Skin: Negative for rash and wound.   Neurological: Negative for dizziness and light-headedness.   Psychiatric/Behavioral: Negative for confusion and dysphoric mood. The patient is not nervous/anxious.      Objective:     Vital Signs (Most Recent):  Temp: 97.4 °F (36.3 °C) (01/09/20 1520)  Pulse: 100 (01/09/20 1521)  Resp: 18  (01/09/20 1520)  BP: (!) 124/58 (01/09/20 1520)  SpO2: (!) 90 % (01/09/20 1520) Vital Signs (24h Range):  Temp:  [97.4 °F (36.3 °C)-98.6 °F (37 °C)] 97.4 °F (36.3 °C)  Pulse:  [] 100  Resp:  [16-18] 18  SpO2:  [90 %-98 %] 90 %  BP: (120-128)/(57-77) 124/58     Weight: 127.3 kg (280 lb 10.3 oz)  Body mass index is 41.44 kg/m².    Review of Symptoms  Symptom Assessment (ESAS 0-10 scale)   ESAS 0 1 2 3 4 5 6 7 8 9 10   Pain    X          Dyspnea  X            Anxiety X             Nausea X             Depression  X             Anorexia X             Fatigue X             Insomnia X             Restlessness  X             Agitation X             CAM / Delirium _X_ --  ___+   Constipation     __ --  _X_+   Diarrhea           _X_ --  ___+  Bowel Management Plan (BMP): Yes    Comments: Senna-Docusate ordered BID PRN    Pain Assessment: Location: bilateral hips, legs and back  Quality: shooting  Quantity: 3/10 in intensity  Aggravating factors: worsens with movement or activity  Alleviating factors: improves with Morphine and Oxycodone      OME in 24 hours: 35    Performance Status: 50    Physical Exam   Constitutional: He is oriented to person, place, and time. He appears well-developed and well-nourished.   On Lasix and  gtt   HENT:   Head: Normocephalic and atraumatic.   Eyes: Conjunctivae and EOM are normal.   Neck: Normal range of motion. Neck supple.   Cardiovascular: Normal rate and regular rhythm.   Pulmonary/Chest: Effort normal. No respiratory distress.   Abdominal: Soft. There is no tenderness.   Musculoskeletal: Normal range of motion. He exhibits edema.   Neurological: He is alert and oriented to person, place, and time.   Skin: Skin is warm and dry.   Psychiatric: His behavior is normal. Thought content normal. Cognition and memory are normal.   Vitals reviewed.      Significant Labs: All pertinent labs within the past 24 hours have been reviewed.  CBC:   Recent Labs   Lab 01/09/20  0342   WBC 7.48    HGB 6.4*   HCT 22.1*   MCV 87        BMP:  Recent Labs   Lab 20  0342   *      K 3.7   CL 89*   CO2 33*   *   CREATININE 3.0*   CALCIUM 9.7   MG 2.6     LFT:  Lab Results   Component Value Date    AST 28 2020     (H) 2019    ALKPHOS 201 (H) 2020    BILITOT 1.6 (H) 2020     Albumin:   Albumin   Date Value Ref Range Status   2020 3.1 (L) 3.5 - 5.2 g/dL Final     Protein:   Total Protein   Date Value Ref Range Status   2020 8.3 6.0 - 8.4 g/dL Final     Lactic acid:   Lab Results   Component Value Date    LACTATE 1.0 2019    LACTATE 1.0 2019       Significant Imaging: I have reviewed all pertinent imaging results/findings within the past 24 hours.    Advance Care Planning   Advanced Directives::  Living Will: No  LaPOST: No  Do Not Resuscitate Status: No  Medical Power of : No. Patient identifies brother Kris [place] ([phone]) as his surrogate decision maker.    Decision-Making Capacity: Patient answered questions       Living Arrangements: Lives alone    Psychosocial/Cultural:  Patient is unmarried and has no children. His parents are . He has 5 siblings and a lot of cousins and friends. He lives alone in [place] but says his brother Kris lives right next to him. 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 He says that his goal is to get healthy. He worries about dying.    Spiritual:     F- Melina and Belief: does not identify with a particular Lutheran but prays to God    I - Importance: somewhat important  .  C - Community: does not belong to a [employer]    A - Address in Care:  visits

## 2020-01-09 NOTE — ASSESSMENT & PLAN NOTE
Plan/Recommendation:  - Continue current regimen with lasix infusion at 40 mg/hr  - Continue metolazone 10mg BID   - Would recommend continued medical management   - Continue to monitor strict In/outs, daily weights  - Will continue to follow pending discussions between patient and palliative care/hospice       Low threshold to dialyze emergently if necessary- currently does not need emergent dialysis

## 2020-01-09 NOTE — SUBJECTIVE & OBJECTIVE
Interval History:     Patient sitting up in chair. Appears tired and states that he is sleepy. Still states he wants more time to consider dialysis and potential central line for access. Reassuring that his urine output is improved on current regimen.     Review of Systems   Constitutional: Negative for chills and fever.   HENT: Negative for congestion and rhinorrhea.    Eyes: Negative for photophobia and visual disturbance.   Respiratory: Positive for shortness of breath. Negative for wheezing.    Cardiovascular: Positive for leg swelling. Negative for chest pain and palpitations.   Gastrointestinal: Negative for nausea and vomiting.   Genitourinary: Negative for difficulty urinating (improved), dysuria, frequency and urgency.   Musculoskeletal: Positive for back pain.   Skin: Positive for rash (lower extremities, chronic venous stasis). Negative for pallor.   Neurological: Negative for dizziness and headaches.   Psychiatric/Behavioral: Negative for agitation and behavioral problems.     Objective:     Vital Signs (Most Recent):  Temp: 98.5 °F (36.9 °C) (01/09/20 0521)  Pulse: (!) 128 (01/09/20 0715)  Resp: 16 (01/09/20 0521)  BP: 123/72 (01/09/20 0521)  SpO2: 95 % (01/09/20 0521)  O2 Device (Oxygen Therapy): room air (01/08/20 0800) Vital Signs (24h Range):  Temp:  [96.2 °F (35.7 °C)-98.5 °F (36.9 °C)] 98.5 °F (36.9 °C)  Pulse:  [] 128  Resp:  [16-18] 16  SpO2:  [91 %-95 %] 95 %  BP: (120-135)/(63-77) 123/72     Weight: 127.3 kg (280 lb 10.3 oz) (01/09/20 0534)  Body mass index is 41.44 kg/m².  Body surface area is 2.49 meters squared.    I/O last 3 completed shifts:  In: 1257.6 [P.O.:1020; I.V.:237.6]  Out: 3680 [Urine:3680]    Physical Exam   Constitutional: He is oriented to person, place, and time. No distress.   Sick appearing male   HENT:   Head: Normocephalic and atraumatic.   Right Ear: External ear normal.   Left Ear: External ear normal.   Nose: Nose normal.   Eyes: Pupils are equal, round, and  reactive to light. EOM are normal. No scleral icterus.   Neck: JVD present. No tracheal deviation present.   Cardiovascular: Normal rate, regular rhythm and intact distal pulses. Exam reveals gallop.   Pulses:       Radial pulses are 2+ on the right side, and 2+ on the left side.   Pulmonary/Chest: Effort normal. No stridor. He has no wheezes. He has rales (bibasilar).   Abdominal: Soft. There is no tenderness. There is no guarding.   Musculoskeletal: He exhibits edema and tenderness.   Neurological: He is alert and oriented to person, place, and time.   Negative for asterixis, no tremor noted    Skin: Skin is warm and dry. Rash (chronic venous stasis ulceration to the lower extremities bilaterally) noted. He is not diaphoretic.   Psychiatric: He has a normal mood and affect. His behavior is normal.   Nursing note and vitals reviewed.      Significant Labs:  All labs within the past 24 hours have been reviewed.

## 2020-01-09 NOTE — ASSESSMENT & PLAN NOTE
- JEAN-PIERRE on CKD III 2/2 cardiorenal syndrome   - Creatinine up trending since admission, baseline 1.6-1.9, currently 3.0

## 2020-01-09 NOTE — ASSESSMENT & PLAN NOTE
25 yo male with HFrEF (25%), CKD 3, and multiple hospital admissions for heart failure exacerbations presenting this admission for acute on chronic heart failure. Despite aggressive diuresis with Lasix 120mg TID, decreased urinary output. He was later transitioned to lasix gtt at 30 mg/hr with multiple doses of diuril without improvement in UPO. Nephrology consult for volume overload refractory to diuresis.    Plan/Recommendation:  - Continue current regimen with lasix infusion at 40 mg/hr  - Continue metolazone 10mg BID   - Would recommend continued medical management   - Continue to monitor strict In/outs, daily weights  - Will continue to follow pending discussions between patient and palliative care/hospice  - Patient states he needs more time to consider trialysis placement for potential HD- will follow closely and touch base with him daily

## 2020-01-09 NOTE — PROGRESS NOTES
Progress Note  Hospital Medicine    Provider team:    [facility] HOSP MED C  [facility] NEPHROLOGY CONSULT SERVICE  Admit Date: 12/24/2019  Encounter Date: 01/09/2020     SUBJECTIVE:     Follow-up Visit for: Acute on chronic combined systolic and diastolic congestive heart failure    HPI (See H&P for complete P,F,SHx):  54M with chronic combined systolic and diastolic heart failure (EF 25%), afib on Coumadin, CAD, CVA, and history of PE who presents to the ED for evaluation of shortness of breath and edema.  He was just admitted to Hospital Medicine from 11/24-12/8 for a CHF exacerbation.  During that admission, he was evaluated by Cardiology who started him on Dobutamine and Diuril.  He was resistant to HTS evaluation during that stay, which included recommendations for LVAD, transplant, and ICD.  He was discharged home on Lasix 80mg PO BID.  He endorses compliance with his diuretic regimen, but despite this and following a low salt diet, he has continued to gain weight, have lower extremity and abdominal swelling, as well as shortness of breath.  He is so short of breath that he has to sleep in a chair, and has been unable to sleep because of his breathing.  He denies any chest pain.  He does have a cough that is mostly dry, but occasionally productive of blood tinged sputum.  He was discharged weight a weight of 259lbs, and claims that is around his dry weight.  He is currently 286lbs.       Upon arrival to the ED, vitals were /83, HR 88, RR 22.  Labs showed Hemoglobin 7.3, INR 1.5, Creatinine 1.8, Alk Phos 278,  with Trop 0.128. CXR showed cardiomegaly and pulmonary edema, along with a masslike opacity. CT chest was attempted to be ordered to evaluate the mass, but he was unable to lie flat to get it evaluated.  He was given Lasix 80mg IV and was admitted to Hospital Medicine for further management.    TTE 11/27/2019  · Severely decreased LVSF.  · The estimated ejection fraction is 25%  · Concentric left  "ventricular hypertrophy.  · Global hypokinetic wall motion.  · Moderate right ventricular enlargement.  · Moderately reduced RVSF.  · Severe biatrial enlargement.  · Moderate mitral regurgitation.  · Moderate tricuspid regurgitation.  · The estimated PA systolic pressure is 40 mm Hg  · Elevated central venous pressure (15 mm Hg).  · Atrial fibrillation observed.    Hospital course:  Patient started on IV diuretics and  infusion for diagnosis of acute on chronic combined heart failure. His weight was stagnant and UOP tapered, so he was transitioned to IVP lasix for IV lasix gtt as this was actually effective during prior admission under guidance from co-management cardiology. However, the patient actually worsened symptomatically and TBili increased along with Cr and weight increase. The patient is now started back on lasix infusion with diuril augmentation. The patient's course is also c/b epistaxis, possibly related to his decompensated picture. He has required pRBC transfusion. He refuses to apply pressure to the bridge of his nose as he says he cannot breathe through his mouth.     Interval history:  The patient reports feeling "beat" as well as his usual hip and back pain. Negative 2000 mL in past 24 hours, weight down 4 lbs. Pt still considering dialysis. Pt planning on home hospice on discharge, however patient remains with poor insight about the definition of hospice. Hb 6.4 secondary to renal failure; pt would like to think about transfusion.    Wt Readings from Last 30 Encounters:   01/09/20 127.3 kg (280 lb 10.3 oz)   12/07/19 117.6 kg (259 lb 4.2 oz)   10/23/19 108.9 kg (240 lb)   09/09/19 108.9 kg (240 lb)   08/04/19 105.2 kg (232 lb)   07/18/19 108.2 kg (238 lb 8.6 oz)   06/20/19 118.9 kg (262 lb 2 oz)   05/21/19 121.1 kg (267 lb)   05/16/19 116.3 kg (256 lb 6.3 oz)   04/20/19 127.5 kg (281 lb 1.4 oz)   01/31/19 113.4 kg (250 lb)   11/04/18 123.1 kg (271 lb 6.2 oz)   08/31/18 119.6 kg (263 lb 10.7 " oz)   07/28/18 113.4 kg (250 lb)   07/10/18 120.7 kg (266 lb)   05/03/18 117.9 kg (259 lb 14.8 oz)   03/30/18 117.9 kg (260 lb)   02/21/18 117.9 kg (260 lb)   01/12/18 113.4 kg (250 lb)   12/10/17 117.9 kg (260 lb)   11/11/17 113.9 kg (251 lb)   11/01/17 111.8 kg (246 lb 7.6 oz)   10/26/17 118.5 kg (261 lb 3.2 oz)   08/13/17 117.9 kg (260 lb)   08/12/17 117.9 kg (260 lb)   05/10/17 117.9 kg (260 lb)   04/05/17 108.4 kg (239 lb 1.4 oz)   02/01/17 108.9 kg (240 lb)   12/15/16 119.4 kg (263 lb 3.7 oz)   08/26/16 112.5 kg (248 lb)     Review of Systems:  Review of Systems   Constitutional: Negative for chills and fever.   HENT: Negative for congestion and sore throat.    Eyes: Negative for photophobia, pain and discharge.   Respiratory: Positive for shortness of breath. Negative for cough, hemoptysis and sputum production.    Cardiovascular: Positive for orthopnea, leg swelling and PND. Negative for chest pain and palpitations.   Gastrointestinal: Negative for abdominal pain, diarrhea, nausea and vomiting.   Genitourinary: Negative for dysuria and urgency.   Musculoskeletal: Negative for myalgias and neck pain.   Skin: Negative for itching and rash.   Neurological: Negative for sensory change, focal weakness and headaches.   Endo/Heme/Allergies: Negative for polydipsia. Does not bruise/bleed easily.   Psychiatric/Behavioral: Negative for depression and suicidal ideas.     Past Medical History:   Diagnosis Date    Anticoagulant long-term use     Cardiomegaly     Chronic combined systolic and diastolic congestive heart failure     Coronary artery disease     Heart attack 2006    Hypertension     Hyperthyroidism, subclinical 1/2/2013    MI (myocardial infarction) 9/22/2013    MI in 2009      Paroxysmal atrial fibrillation     PE (pulmonary embolism) 1/1/2013    IN 2010     S/P ablation of atrial flutter 2008    Stroke 2009    no residual weaknesses     Social History     Socioeconomic History    Marital  "status: Single     Spouse name: Not on file    Number of children: Not on file    Years of education: Not on file    Highest education level: Not on file   Occupational History    Not on file   Social Needs    Financial resource strain: Not on file    Food insecurity:     Worry: Not on file     Inability: Not on file    Transportation needs:     Medical: Not on file     Non-medical: Not on file   Tobacco Use    Smoking status: Never Smoker    Smokeless tobacco: Never Used   Substance and Sexual Activity    Alcohol use: No    Drug use: No    Sexual activity: Never   Lifestyle    Physical activity:     Days per week: Not on file     Minutes per session: Not on file    Stress: Not on file   Relationships    Social connections:     Talks on phone: Not on file     Gets together: Not on file     Attends Latter day service: Not on file     Active member of club or organization: Not on file     Attends meetings of clubs or organizations: Not on file     Relationship status: Not on file   Other Topics Concern    Not on file   Social History Narrative    Not on file       OBJECTIVE:       Intake/Output Summary (Last 24 hours) at 1/9/2020 1204  Last data filed at 1/9/2020 0800  Gross per 24 hour   Intake 778.8 ml   Output 2200 ml   Net -1421.2 ml     Vital Signs Range (Last 24H):  Temp:  [97.5 °F (36.4 °C)-98.6 °F (37 °C)]   Pulse:  []   Resp:  [16-18]   BP: (120-128)/(63-77)   SpO2:  [92 %-98 %]   Body mass index is 41.44 kg/m².    Objective:  General Appearance:  Comfortable, well-appearing, in no acute distress and not in pain.    Vital signs: (most recent): Blood pressure 128/70, pulse (!) 115, temperature 98.6 °F (37 °C), temperature source Oral, resp. rate 18, height 5' 9" (1.753 m), weight 127.3 kg (280 lb 10.3 oz), SpO2 98 %.  No fever.    Output: Producing urine and producing stool.    HEENT: Normal HEENT exam.  (+JVD)    Lungs:  Normal effort.  Breath sounds clear to auscultation.  He is not in " respiratory distress.  There are rales.  No wheezes.    Heart: Normal rate.  Regular rhythm.  S1 normal and S2 normal.  Positive for murmur.    Chest: Symmetric chest wall expansion.   Abdomen: Abdomen is soft.  Bowel sounds are normal.   There is no abdominal tenderness.     Extremities: Normal range of motion.  There is venous stasis and dependent edema.  There is no deformity, effusion or local swelling.    Pulses: Distal pulses are intact.    Neurological: Patient is alert and oriented to person, place and time.  Normal strength.    Pupils:  Pupils are equal, round, and reactive to light.    Skin:  Warm and dry.      Medications:  Medication list was reviewed in EPIC and changes noted under Assessment/Plan and MAR.    Laboratory:  Recent Labs     01/09/20  0342   WBC 7.48   RBC 2.54*   HGB 6.4*   HCT 22.1*      MCV 87   MCH 25.2*   MCHC 29.0*   GRAN 84.8*  6.3   LYMPH 4.3*  0.3*   MONO 10.3  0.8   EOS 0.0      Recent Labs     01/09/20  0342   *      K 3.7   CL 89*   CO2 33*   *   CREATININE 3.0*   CALCIUM 9.7   ANIONGAP 17*   MG 2.6   PHOS 4.3     Prior records reviewed.    ECHO reviewed:  · Severely decreased left ventricular systolic function. The estimated ejection fraction is 25%  · Concentric left ventricular hypertrophy.  · Global hypokinetic wall motion.  · Moderate right ventricular enlargement.  · Moderately reduced right ventricular systolic function.  · Severe biatrial enlargement.  · Moderate mitral regurgitation.  · Moderate tricuspid regurgitation.  · The estimated PA systolic pressure is 40 mm Hg  · Elevated central venous pressure (15 mm Hg).  · Atrial fibrillation observed.      Imaging reviewed  Imaging Results          CT Chest Without Contrast (No Result on File)                X-Ray Chest AP Portable (Final result)  Result time 12/24/19 19:09:07    Final result by Zeb Hirsch MD (12/24/19 19:09:07)                 Impression:      Masslike opacity overlying  the right lower lung zone, similar to prior exam.  Right pleural fluid not excluded.  Chest CT recommended for further evaluation of right lung masslike opacity.    Patchy bilateral pulmonary opacities, similar to prior exam.    Cardiomegaly.    Electronically signed by resident: Cristofer Reddy  Date:    12/24/2019  Time:    18:48    Electronically signed by: Zeb Hirsch MD  Date:    12/24/2019  Time:    19:09             Narrative:    EXAMINATION:  XR CHEST AP PORTABLE    CLINICAL HISTORY:  CHF;    TECHNIQUE:  Single frontal view of the chest was performed.    COMPARISON:  Chest radiograph 12/03/2019    FINDINGS:  Cardiac leads overlie the chest wall.    Redemonstration of extensive bilateral patchy opacities, more prominent on the right with a focal area of masslike opacity in the right lower lung zone.  No significant change from prior exam.  Left costophrenic angle is visible and right sided pleural fluid not excluded noting presence of opacification.  No pneumothorax.    The cardiac silhouette is enlarged.  Hilar and mediastinal contours are unchanged.    Bones are intact.                                  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 [I13.0]  Yes    Elevated alkaline phosphatase level [R74.8]  Yes    Coronary artery disease [I25.10]  Yes     Chronic    Stage 3 chronic kidney disease [N18.3]  Yes    Subtherapeutic international normalized ratio (INR) [R79.1]  Yes    Personal history of cerebral embolism [Z86.79]  Not Applicable    Personal history of venous thrombosis and embolism [Z86.718]  Not Applicable    Personal history of MI  (myocardial infarction) [I25.2]  Not Applicable    Essential hypertension [I10]  Yes     Chronic    Atrial fibrillation, chronic [I48.20]  Yes     Chronic    Chronic anticoagulation [Z79.01]  Not Applicable     Chronic    Cardiomyopathy [I42.9]  Yes     Chronic      Resolved Hospital Problems   No resolved problems to display.      Acute on Chronic Combined Systolic and Diastolic Heart Failure  Cardiomyopathy  · CHF Pathway initiated  · Last 2D echo Nov 2019 with EF 25%  ·  and CXR with pulmonary edema and cardiomegaly  · Started on Lasix gtt, increased to 40 mg/hr. Failed transition to high dose IV Lasix pushes. Back on Lasix gtt  · BID Metolazone 10 mg per Nephro recs  · Cont  gtt 2.5, which will be continued at home. Will need long-term IV placement when he nears discharge. PICC placement may be an issue given his renal failure   · Cardiology Co-mgmt consulted. Recommend Nephro consult for temporary HD for volume removal due to diuretic resistance  · Goal diuresis 2-3L/day.  Home diuretic dose:  Lasix 80mg PO BID  · BID BMP (with morning labs and at 4pm)  · Strict I&Os with daily weights.  · Hold BB, okay to c/w BiDil     JEAN-PIERRE on CKD3  · Baseline 1.8  · Cardiorenal. Not improving as patient is not diuresing well  · Nephrology consult as above - needs temporary HD for volume removal. Pt still considering it    Chronic AFib  Long Term Use of Anticoagulants  Subtherapeutic INR  · Chronic issue  · HR in 100-120's  · Continue Warfarin at increased dose  · Pharmacy consulted for dosing management     Essential HTN  · Chronic and stable  · Continue Bidil BID     CAD  History of MI  · Chronic and stable  · Continue Aspirin 81mg PO daily  · Continue Bidil BID     Anemia  · Received 5 units PRBCs on prior hospital admission  · Continue PO iron  · Type and screen obtained  · Monitor for bleeding/need for transfusion  · Transfused 1U pRBC after episode of epistaxis noted. Refusing further transfusions at this  time     History of VTE  History of Stroke  · Chronic and stable  · Continue Aspirin 81mg PO daily  · Continue Warfarin as above      Elevated Alk Phos  · Chronic and stable  · Monitor     Benign Hypertensive Heart and Kidney Disease with HF and CKD  · As above     Abnormal CT  · Needs CT chest when able to lie flat or outpt to evaluate masslike opacity seen on CXR     Diet:  Low Sodium, 1 L fluid restriction  VTE PPx:  Warfarin  Goals of Care:  Full     High Risk Conditions:   Patient is currently on drug therapy requiring intensive monitoring for toxicity: Lasix gtt    Anticipated discharge date and disposition:   Pending diuresis, Nephro recs. Home hospice on discharge. Pt w/ very poor insight into disease process and will likely continue to be readmitted to the hospital    Antoinette Goins MD  [facility] Medicine

## 2020-01-10 LAB
ALBUMIN SERPL BCP-MCNC: 3.1 G/DL (ref 3.5–5.2)
ALP SERPL-CCNC: 200 U/L (ref 55–135)
ALT SERPL W/O P-5'-P-CCNC: 16 U/L (ref 10–44)
ANION GAP SERPL CALC-SCNC: 14 MMOL/L (ref 8–16)
ANION GAP SERPL CALC-SCNC: 15 MMOL/L (ref 8–16)
AST SERPL-CCNC: 28 U/L (ref 10–40)
BACTERIA BLD CULT: NORMAL
BACTERIA BLD CULT: NORMAL
BASOPHILS # BLD AUTO: 0.01 K/UL (ref 0–0.2)
BASOPHILS NFR BLD: 0.1 % (ref 0–1.9)
BILIRUB SERPL-MCNC: 1.4 MG/DL (ref 0.1–1)
BUN SERPL-MCNC: 130 MG/DL (ref 6–20)
BUN SERPL-MCNC: 134 MG/DL (ref 6–20)
CALCIUM SERPL-MCNC: 10 MG/DL (ref 8.7–10.5)
CALCIUM SERPL-MCNC: 9.7 MG/DL (ref 8.7–10.5)
CHLORIDE SERPL-SCNC: 86 MMOL/L (ref 95–110)
CHLORIDE SERPL-SCNC: 89 MMOL/L (ref 95–110)
CO2 SERPL-SCNC: 35 MMOL/L (ref 23–29)
CO2 SERPL-SCNC: 35 MMOL/L (ref 23–29)
CREAT SERPL-MCNC: 2.9 MG/DL (ref 0.5–1.4)
CREAT SERPL-MCNC: 3 MG/DL (ref 0.5–1.4)
DIFFERENTIAL METHOD: ABNORMAL
EOSINOPHIL # BLD AUTO: 0 K/UL (ref 0–0.5)
EOSINOPHIL NFR BLD: 0.1 % (ref 0–8)
ERYTHROCYTE [DISTWIDTH] IN BLOOD BY AUTOMATED COUNT: 22.4 % (ref 11.5–14.5)
EST. GFR  (AFRICAN AMERICAN): 26.2 ML/MIN/1.73 M^2
EST. GFR  (AFRICAN AMERICAN): 27.3 ML/MIN/1.73 M^2
EST. GFR  (NON AFRICAN AMERICAN): 22.7 ML/MIN/1.73 M^2
EST. GFR  (NON AFRICAN AMERICAN): 23.6 ML/MIN/1.73 M^2
GLUCOSE SERPL-MCNC: 132 MG/DL (ref 70–110)
GLUCOSE SERPL-MCNC: 181 MG/DL (ref 70–110)
HCT VFR BLD AUTO: 22.4 % (ref 40–54)
HGB BLD-MCNC: 6.6 G/DL (ref 14–18)
IMM GRANULOCYTES # BLD AUTO: 0.06 K/UL (ref 0–0.04)
IMM GRANULOCYTES NFR BLD AUTO: 0.6 % (ref 0–0.5)
INR PPP: 3.4 (ref 0.8–1.2)
LYMPHOCYTES # BLD AUTO: 0.7 K/UL (ref 1–4.8)
LYMPHOCYTES NFR BLD: 7.5 % (ref 18–48)
MAGNESIUM SERPL-MCNC: 2.8 MG/DL (ref 1.6–2.6)
MCH RBC QN AUTO: 25.5 PG (ref 27–31)
MCHC RBC AUTO-ENTMCNC: 29.5 G/DL (ref 32–36)
MCV RBC AUTO: 87 FL (ref 82–98)
MONOCYTES # BLD AUTO: 1.9 K/UL (ref 0.3–1)
MONOCYTES NFR BLD: 19.4 % (ref 4–15)
NEUTROPHILS # BLD AUTO: 6.9 K/UL (ref 1.8–7.7)
NEUTROPHILS NFR BLD: 72.3 % (ref 38–73)
NRBC BLD-RTO: 0 /100 WBC
PHOSPHATE SERPL-MCNC: 3.8 MG/DL (ref 2.7–4.5)
PLATELET # BLD AUTO: 268 K/UL (ref 150–350)
PMV BLD AUTO: 10.2 FL (ref 9.2–12.9)
POTASSIUM SERPL-SCNC: 3.8 MMOL/L (ref 3.5–5.1)
POTASSIUM SERPL-SCNC: 3.9 MMOL/L (ref 3.5–5.1)
PROT SERPL-MCNC: 8.7 G/DL (ref 6–8.4)
PROTHROMBIN TIME: 32.3 SEC (ref 9–12.5)
RBC # BLD AUTO: 2.59 M/UL (ref 4.6–6.2)
SODIUM SERPL-SCNC: 135 MMOL/L (ref 136–145)
SODIUM SERPL-SCNC: 139 MMOL/L (ref 136–145)
WBC # BLD AUTO: 9.6 K/UL (ref 3.9–12.7)

## 2020-01-10 PROCEDURE — 94761 N-INVAS EAR/PLS OXIMETRY MLT: CPT

## 2020-01-10 PROCEDURE — 99233 PR SUBSEQUENT HOSPITAL CARE,LEVL III: ICD-10-PCS | Mod: ,,, | Performed by: INTERNAL MEDICINE

## 2020-01-10 PROCEDURE — 99233 PR SUBSEQUENT HOSPITAL CARE,LEVL III: ICD-10-PCS | Mod: ,,, | Performed by: HOSPITALIST

## 2020-01-10 PROCEDURE — 80048 BASIC METABOLIC PNL TOTAL CA: CPT

## 2020-01-10 PROCEDURE — 85025 COMPLETE CBC W/AUTO DIFF WBC: CPT

## 2020-01-10 PROCEDURE — 25000003 PHARM REV CODE 250: Performed by: HOSPITALIST

## 2020-01-10 PROCEDURE — 83735 ASSAY OF MAGNESIUM: CPT

## 2020-01-10 PROCEDURE — 63600175 PHARM REV CODE 636 W HCPCS: Performed by: HOSPITALIST

## 2020-01-10 PROCEDURE — 20600001 HC STEP DOWN PRIVATE ROOM

## 2020-01-10 PROCEDURE — 84100 ASSAY OF PHOSPHORUS: CPT

## 2020-01-10 PROCEDURE — 36415 COLL VENOUS BLD VENIPUNCTURE: CPT

## 2020-01-10 PROCEDURE — 99233 SBSQ HOSP IP/OBS HIGH 50: CPT | Mod: ,,, | Performed by: INTERNAL MEDICINE

## 2020-01-10 PROCEDURE — 97803 MED NUTRITION INDIV SUBSEQ: CPT

## 2020-01-10 PROCEDURE — 80053 COMPREHEN METABOLIC PANEL: CPT

## 2020-01-10 PROCEDURE — 85610 PROTHROMBIN TIME: CPT

## 2020-01-10 PROCEDURE — 99233 SBSQ HOSP IP/OBS HIGH 50: CPT | Mod: ,,, | Performed by: HOSPITALIST

## 2020-01-10 RX ORDER — SODIUM CHLORIDE 9 MG/ML
INJECTION, SOLUTION INTRAVENOUS
Status: DISCONTINUED | OUTPATIENT
Start: 2020-01-10 | End: 2020-01-13

## 2020-01-10 RX ORDER — SODIUM CHLORIDE 9 MG/ML
INJECTION, SOLUTION INTRAVENOUS ONCE
Status: DISCONTINUED | OUTPATIENT
Start: 2020-01-10 | End: 2020-01-13

## 2020-01-10 RX ADMIN — OXYCODONE HYDROCHLORIDE 15 MG: 10 TABLET ORAL at 09:01

## 2020-01-10 RX ADMIN — ASPIRIN 81 MG: 81 TABLET, COATED ORAL at 10:01

## 2020-01-10 RX ADMIN — FUROSEMIDE 40 MG/HR: 10 INJECTION, SOLUTION INTRAMUSCULAR; INTRAVENOUS at 02:01

## 2020-01-10 RX ADMIN — METOLAZONE 10 MG: 10 TABLET ORAL at 10:01

## 2020-01-10 RX ADMIN — HYDRALAZINE HYDROCHLORIDE AND ISOSORBIDE DINITRATE 1 TABLET: 37.5; 2 TABLET, FILM COATED ORAL at 10:01

## 2020-01-10 RX ADMIN — FUROSEMIDE 40 MG/HR: 10 INJECTION, SOLUTION INTRAMUSCULAR; INTRAVENOUS at 07:01

## 2020-01-10 RX ADMIN — OXYCODONE HYDROCHLORIDE 15 MG: 10 TABLET ORAL at 03:01

## 2020-01-10 RX ADMIN — METOLAZONE 10 MG: 10 TABLET ORAL at 09:01

## 2020-01-10 RX ADMIN — HYDRALAZINE HYDROCHLORIDE AND ISOSORBIDE DINITRATE 1 TABLET: 37.5; 2 TABLET, FILM COATED ORAL at 09:01

## 2020-01-10 RX ADMIN — OXYCODONE HYDROCHLORIDE 15 MG: 10 TABLET ORAL at 10:01

## 2020-01-10 RX ADMIN — PREDNISONE 40 MG: 20 TABLET ORAL at 10:01

## 2020-01-10 RX ADMIN — DOBUTAMINE IN DEXTROSE 2.5 MCG/KG/MIN: 200 INJECTION, SOLUTION INTRAVENOUS at 09:01

## 2020-01-10 RX ADMIN — FERROUS SULFATE TAB EC 325 MG (65 MG FE EQUIVALENT) 325 MG: 325 (65 FE) TABLET DELAYED RESPONSE at 10:01

## 2020-01-10 NOTE — ASSESSMENT & PLAN NOTE
Mr. Terrell is a 53 y/o male with HFrEF (last 2D ECHO EF 23%, moderate MR, moderate TR, diastolic dysfunction), hypertension, chronic atrial fibrillation, a flutter status post ablation, CVA in 2009, coronary artery disease, CKD III, and obstructive sleep apnea presents with ADCHF. Nephro consulted for JEAN-PIERRE on CKD in the setting of Cardiorenal syndrome.   - JEAN-PIERRE was thought to be to be multifactorial from labile BP, and component of cardiorenal syndrome  - Creatinine up trending since admission, baseline 1.6-1.9, currently 3.0   - Azotemia worsening daily. No uremic symptoms present.     Plan/Rec  - Temporary dialysis catheter placement   - Initial HD session for volume removal and metabolic clearance  - Diuretic management can be adjusted accordingly after HD  - Strict I/O and chart  - Avoid nephrotoxic medications, NSAIDs, IV contrast, etc.  - Daily weights and chart  - Medication doses adjusted to GFR  - Maintain MAP > 65  - Hb > 7 gm/dL  - Will follow closely

## 2020-01-10 NOTE — CODE/ RAPID DOCUMENTATION
Rapid Response Nurse Chart Check     Chart check completed, abnormal VS noted. Bedside RN Alicia contacted, no concerns verbalized at this time, instructed to call [phone] for further concerns or assistance.

## 2020-01-10 NOTE — PROGRESS NOTES
Progress Note  Hospital Medicine    Provider team:    [facility] HOSP MED C  [facility] NEPHROLOGY CONSULT SERVICE  Admit Date: 12/24/2019  Encounter Date: 01/10/2020     SUBJECTIVE:     Follow-up Visit for: Acute on chronic combined systolic and diastolic congestive heart failure    HPI (See H&P for complete P,F,SHx):  54M with chronic combined systolic and diastolic heart failure (EF 25%), afib on Coumadin, CAD, CVA, and history of PE who presents to the ED for evaluation of shortness of breath and edema.  He was just admitted to Hospital Medicine from 11/24-12/8 for a CHF exacerbation.  During that admission, he was evaluated by Cardiology who started him on Dobutamine and Diuril.  He was resistant to HTS evaluation during that stay, which included recommendations for LVAD, transplant, and ICD.  He was discharged home on Lasix 80mg PO BID.  He endorses compliance with his diuretic regimen, but despite this and following a low salt diet, he has continued to gain weight, have lower extremity and abdominal swelling, as well as shortness of breath.  He is so short of breath that he has to sleep in a chair, and has been unable to sleep because of his breathing.  He denies any chest pain.  He does have a cough that is mostly dry, but occasionally productive of blood tinged sputum.  He was discharged weight a weight of 259lbs, and claims that is around his dry weight.  He is currently 286lbs.       Upon arrival to the ED, vitals were /83, HR 88, RR 22.  Labs showed Hemoglobin 7.3, INR 1.5, Creatinine 1.8, Alk Phos 278,  with Trop 0.128. CXR showed cardiomegaly and pulmonary edema, along with a masslike opacity. CT chest was attempted to be ordered to evaluate the mass, but he was unable to lie flat to get it evaluated.  He was given Lasix 80mg IV and was admitted to Hospital Medicine for further management.    TTE 11/27/2019  · Severely decreased LVSF.  · The estimated ejection fraction is 25%  · Concentric left  ventricular hypertrophy.  · Global hypokinetic wall motion.  · Moderate right ventricular enlargement.  · Moderately reduced RVSF.  · Severe biatrial enlargement.  · Moderate mitral regurgitation.  · Moderate tricuspid regurgitation.  · The estimated PA systolic pressure is 40 mm Hg  · Elevated central venous pressure (15 mm Hg).  · Atrial fibrillation observed.    Hospital course:  Patient started on IV diuretics and  infusion for diagnosis of acute on chronic combined heart failure. His weight was stagnant and UOP tapered, so he was transitioned to IVP lasix for IV lasix gtt as this was actually effective during prior admission under guidance from co-management cardiology. However, the patient actually worsened symptomatically and TBili increased along with Cr and weight increase. The patient is now started back on lasix infusion with diuril augmentation. The patient's course is also c/b epistaxis, possibly related to his decompensated picture. He has required pRBC transfusion. He refuses to apply pressure to the bridge of his nose as he says he cannot breathe through his mouth.     Interval history:  Pt amenable for dialysis today. Anesthesia to place temporary dialysis line today. Hb 6.6, patient deferring transfusion at this time.     Wt Readings from Last 30 Encounters:   01/10/20 127.6 kg (281 lb 4.9 oz)   12/07/19 117.6 kg (259 lb 4.2 oz)   10/23/19 108.9 kg (240 lb)   09/09/19 108.9 kg (240 lb)   08/04/19 105.2 kg (232 lb)   07/18/19 108.2 kg (238 lb 8.6 oz)   06/20/19 118.9 kg (262 lb 2 oz)   05/21/19 121.1 kg (267 lb)   05/16/19 116.3 kg (256 lb 6.3 oz)   04/20/19 127.5 kg (281 lb 1.4 oz)   01/31/19 113.4 kg (250 lb)   11/04/18 123.1 kg (271 lb 6.2 oz)   08/31/18 119.6 kg (263 lb 10.7 oz)   07/28/18 113.4 kg (250 lb)   07/10/18 120.7 kg (266 lb)   05/03/18 117.9 kg (259 lb 14.8 oz)   03/30/18 117.9 kg (260 lb)   02/21/18 117.9 kg (260 lb)   01/12/18 113.4 kg (250 lb)   12/10/17 117.9 kg (260 lb)    11/11/17 113.9 kg (251 lb)   11/01/17 111.8 kg (246 lb 7.6 oz)   10/26/17 118.5 kg (261 lb 3.2 oz)   08/13/17 117.9 kg (260 lb)   08/12/17 117.9 kg (260 lb)   05/10/17 117.9 kg (260 lb)   04/05/17 108.4 kg (239 lb 1.4 oz)   02/01/17 108.9 kg (240 lb)   12/15/16 119.4 kg (263 lb 3.7 oz)   08/26/16 112.5 kg (248 lb)     Review of Systems:  Review of Systems   Constitutional: Negative for chills and fever.   HENT: Negative for congestion and sore throat.    Eyes: Negative for photophobia, pain and discharge.   Respiratory: Positive for shortness of breath. Negative for cough, hemoptysis and sputum production.    Cardiovascular: Positive for orthopnea, leg swelling and PND. Negative for chest pain and palpitations.   Gastrointestinal: Negative for abdominal pain, diarrhea, nausea and vomiting.   Genitourinary: Negative for dysuria and urgency.   Musculoskeletal: Negative for myalgias and neck pain.   Skin: Negative for itching and rash.   Neurological: Negative for sensory change, focal weakness and headaches.   Endo/Heme/Allergies: Negative for polydipsia. Does not bruise/bleed easily.   Psychiatric/Behavioral: Negative for depression and suicidal ideas.     Past Medical History:   Diagnosis Date    Anticoagulant long-term use     Cardiomegaly     Chronic combined systolic and diastolic congestive heart failure     Coronary artery disease     Heart attack 2006    Hypertension     Hyperthyroidism, subclinical 1/2/2013    MI (myocardial infarction) 9/22/2013    MI in 2009      Paroxysmal atrial fibrillation     PE (pulmonary embolism) 1/1/2013    IN 2010     S/P ablation of atrial flutter 2008    Stroke 2009    no residual weaknesses     Social History     Socioeconomic History    Marital status: Single     Spouse name: Not on file    Number of children: Not on file    Years of education: Not on file    Highest education level: Not on file   Occupational History    Not on file   Social Needs     "Financial resource strain: Not on file    Food insecurity:     Worry: Not on file     Inability: Not on file    Transportation needs:     Medical: Not on file     Non-medical: Not on file   Tobacco Use    Smoking status: Never Smoker    Smokeless tobacco: Never Used   Substance and Sexual Activity    Alcohol use: No    Drug use: No    Sexual activity: Never   Lifestyle    Physical activity:     Days per week: Not on file     Minutes per session: Not on file    Stress: Not on file   Relationships    Social connections:     Talks on phone: Not on file     Gets together: Not on file     Attends Orthodox service: Not on file     Active member of club or organization: Not on file     Attends meetings of clubs or organizations: Not on file     Relationship status: Not on file   Other Topics Concern    Not on file   Social History Narrative    Not on file       OBJECTIVE:       Intake/Output Summary (Last 24 hours) at 1/10/2020 1049  Last data filed at 1/10/2020 0614  Gross per 24 hour   Intake 1534.12 ml   Output 2875 ml   Net -1340.88 ml     Vital Signs Range (Last 24H):  Temp:  [96 °F (35.6 °C)-98.1 °F (36.7 °C)]   Pulse:  []   Resp:  [16-22]   BP: (119-130)/(57-74)   SpO2:  [90 %-95 %]   Body mass index is 41.54 kg/m².    Objective:  General Appearance:  Comfortable, well-appearing, in no acute distress and not in pain.    Vital signs: (most recent): Blood pressure 127/67, pulse (!) 141, temperature 96 °F (35.6 °C), temperature source Oral, resp. rate 20, height 5' 9" (1.753 m), weight 127.6 kg (281 lb 4.9 oz), SpO2 95 %.  No fever.    Output: Producing urine and producing stool.    HEENT: Normal HEENT exam.  (+JVD)    Lungs:  Normal effort.  Breath sounds clear to auscultation.  He is not in respiratory distress.  There are rales.  No wheezes.    Heart: Normal rate.  Regular rhythm.  S1 normal and S2 normal.  Positive for murmur.    Chest: Symmetric chest wall expansion.   Abdomen: Abdomen is soft.  " Bowel sounds are normal.   There is no abdominal tenderness.     Extremities: Normal range of motion.  There is venous stasis and dependent edema.  There is no deformity, effusion or local swelling.    Pulses: Distal pulses are intact.    Neurological: Patient is alert and oriented to person, place and time.  Normal strength.    Pupils:  Pupils are equal, round, and reactive to light.    Skin:  Warm and dry.      Medications:  Medication list was reviewed in EPIC and changes noted under Assessment/Plan and MAR.    Laboratory:  Recent Labs     01/10/20  0736   WBC 9.60   RBC 2.59*   HGB 6.6*   HCT 22.4*      MCV 87   MCH 25.5*   MCHC 29.5*   GRAN 72.3  6.9   LYMPH 7.5*  0.7*   MONO 19.4*  1.9*   EOS 0.0      Recent Labs     01/10/20  0736   *      K 3.8   CL 89*   CO2 35*   *   CREATININE 2.9*   CALCIUM 10.0   ANIONGAP 15   MG 2.8*   PHOS 3.8     Prior records reviewed.    ECHO reviewed:  · Severely decreased left ventricular systolic function. The estimated ejection fraction is 25%  · Concentric left ventricular hypertrophy.  · Global hypokinetic wall motion.  · Moderate right ventricular enlargement.  · Moderately reduced right ventricular systolic function.  · Severe biatrial enlargement.  · Moderate mitral regurgitation.  · Moderate tricuspid regurgitation.  · The estimated PA systolic pressure is 40 mm Hg  · Elevated central venous pressure (15 mm Hg).  · Atrial fibrillation observed.      Imaging reviewed  Imaging Results          CT Chest Without Contrast (No Result on File)                X-Ray Chest AP Portable (Final result)  Result time 12/24/19 19:09:07    Final result by Zeb Hirsch MD (12/24/19 19:09:07)                 Impression:      Masslike opacity overlying the right lower lung zone, similar to prior exam.  Right pleural fluid not excluded.  Chest CT recommended for further evaluation of right lung masslike opacity.    Patchy bilateral pulmonary opacities, similar  to prior exam.    Cardiomegaly.    Electronically signed by resident: Cristofer Reddy  Date:    12/24/2019  Time:    18:48    Electronically signed by: Zeb Hirsch MD  Date:    12/24/2019  Time:    19:09             Narrative:    EXAMINATION:  XR CHEST AP PORTABLE    CLINICAL HISTORY:  CHF;    TECHNIQUE:  Single frontal view of the chest was performed.    COMPARISON:  Chest radiograph 12/03/2019    FINDINGS:  Cardiac leads overlie the chest wall.    Redemonstration of extensive bilateral patchy opacities, more prominent on the right with a focal area of masslike opacity in the right lower lung zone.  No significant change from prior exam.  Left costophrenic angle is visible and right sided pleural fluid not excluded noting presence of opacification.  No pneumothorax.    The cardiac silhouette is enlarged.  Hilar and mediastinal contours are unchanged.    Bones are intact.                                  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 [I13.0]  Yes    Elevated alkaline phosphatase level [R74.8]  Yes    Coronary artery disease [I25.10]  Yes     Chronic    Stage 3 chronic kidney disease [N18.3]  Yes    Subtherapeutic international normalized ratio (INR) [R79.1]  Yes    Personal history of cerebral embolism [Z86.79]  Not Applicable    Personal history of venous thrombosis and embolism [Z86.718]  Not Applicable    Personal history of MI (myocardial infarction) [I25.2]  Not Applicable    Essential hypertension [I10]  Yes     Chronic    Atrial fibrillation, chronic [I48.20]  Yes     Chronic    Chronic anticoagulation [Z79.01]  Not Applicable      Chronic    Cardiomyopathy [I42.9]  Yes     Chronic      Resolved Hospital Problems   No resolved problems to display.      Acute on Chronic Combined Systolic and Diastolic Heart Failure  Cardiomyopathy  · CHF Pathway initiated  · Last 2D echo Nov 2019 with EF 25%  ·  and CXR with pulmonary edema and cardiomegaly  · Started on Lasix gtt, increased to 40 mg/hr. Failed transition to high dose IV Lasix pushes. Back on Lasix gtt  · BID Metolazone 10 mg per Nephro recs  · Cont  gtt 2.5, which will be continued at home. Will need long-term IV placement when he nears discharge. PICC placement may be an issue given his renal failure   · Cardiology Co-mgmt consulted. Recommend Nephro consult for temporary HD for volume removal due to diuretic resistance  · Goal diuresis 2-3L/day.  Home diuretic dose:  Lasix 80mg PO BID  · BID BMP (with morning labs and at 4pm)  · Strict I&Os with daily weights.  · Hold BB, okay to c/w BiDil     JEAN-PIERRE on CKD3  · Baseline 1.8  · Cardiorenal. Not improving as patient is not diuresing well  · Nephrology consult as above - needs temporary HD for volume removal. Will be initiated today after line placed as pt is now amenable    Chronic AFib  Long Term Use of Anticoagulants  Supratherapeutic INR  · Chronic issue  · HR in 100-120's  · INR initially subtherapeutic. Dose increased to 7.5mg. Now supratherapeutic. Hold coumadin until improved.   · Pharmacy consulted for dosing management     Essential HTN  · Chronic and stable  · Continue Bidil BID     CAD  History of MI  · Chronic and stable  · Continue Aspirin 81mg PO daily  · Continue Bidil BID     Anemia  · Received 5 units PRBCs on prior hospital admission  · Continue PO iron  · Type and screen obtained  · Monitor for bleeding/need for transfusion  · Transfused 1U pRBC after episode of epistaxis noted. Refusing further transfusions at this time     History of VTE  History of Stroke  · Chronic and stable  · Continue Aspirin 81mg PO  daily  · Continue Warfarin when INR appropriate      Elevated Alk Phos  · Chronic and stable  · Monitor     Benign Hypertensive Heart and Kidney Disease with HF and CKD  · As above     Abnormal CT  · Needs CT chest when able to lie flat or outpt to evaluate masslike opacity seen on CXR     Diet:  Low Sodium, 1 L fluid restriction  VTE PPx:  Warfarin  Goals of Care:  Full     High Risk Conditions:   Patient is currently on drug therapy requiring intensive monitoring for toxicity: Lasix gtt    Anticipated discharge date and disposition:   Pending volume removal/diuresis. Home hospice on discharge. Pt w/ very poor insight into disease process and will likely continue to be readmitted to the hospital    Antoinette Goins MD  [facility]

## 2020-01-10 NOTE — CARE UPDATE
Rapid Response Respiratory Therapy Proactive Rounding Note      Time of visit: 1639     Code Status: Full Code   : 1965  Age: 54 y.o.  Weight:   Wt Readings from Last 1 Encounters:   01/10/20 127.6 kg (281 lb 4.9 oz)     Sex: male  Race: Black or    Bed: 355/355 A:   MRN: [id number]    SITUATION     Evaluated patient for: MEWS Score     BACKGROUND     Patient has a past medical history of Anticoagulant long-term use, Cardiomegaly, Chronic combined systolic and diastolic congestive heart failure, Coronary artery disease, Heart attack, Hypertension, Hyperthyroidism, subclinical, MI (myocardial infarction), Paroxysmal atrial fibrillation, PE (pulmonary embolism), S/P ablation of atrial flutter, and Stroke.    ASSESSMENT/INTERVENTIONS     Upon arrival in room, pt found on room air sitting in a chair. Pt denies any shortness of breath. No respiratory distress noted. Will continue to monitor.     Pulse: 100 Respiratory rate: 20 Temperature: Temp: 98.6 °F (37 °C) BP: BP: 127/60 SpO2: 95%  Level of Consciousness: Level of Consciousness (AVPU): alert  Respiratory Effort: Respiratory Effort: Normal, Unlabored Expansion/Accessory Muscle Usage: Expansion/Accessory Muscles/Retractions: expansion symmetric  All Lung Field Breath Sounds: All Lung Fields Breath Sounds: Anterior:, Lateral:, diminished  Mobility at time of assessment: General Mobility: generalized weakness  O2 Device/Concentration: Room air   Most recent blood gas: No results for input(s): PH, PCO2, PO2, HCO3, POCSATURATED, BE in the last 72 hours.   NIPPV: No Surgical airway: No   Ambu at bedside: Ambu bag with the patient?: Yes, Adult Ambu    Current Respiratory Care Orders:     19 0000  Pulse Oximetry Q4H Every 4 hours (103 of 132 released)    Release   Comments: With vitals    19 3121         RECOMMENDATIONS     We recommend: Continue plan of care    ESCALATION      Physician Escalation (Yes/No) No    Discussed plan of  care primary RT, Lexx     FOLLOW-UP     Please call back the Rapid Response RT, Myra Stanton, RRT at x [phone] for any questions or concerns.

## 2020-01-10 NOTE — ASSESSMENT & PLAN NOTE
27 yo male with HFrEF (25%), CKD 3, and multiple hospital admissions for heart failure exacerbations presenting this admission for acute on chronic heart failure. Despite aggressive diuresis with Lasix 120mg TID, decreased urinary output. He was later transitioned to lasix gtt at 30 mg/hr with multiple doses of diuril without improvement in UPO. Nephrology consult for volume overload refractory to diuresis.    Plan/Recommendation:  - Continue current regimen with lasix infusion at 40 mg/hr. Reassess daily based on hemodynamics and UF of HD sessions.   - Continue metolazone 10mg BID

## 2020-01-10 NOTE — NURSING
"0508- PATIENT REFUSING TO ALLOW PHLEBOTOMIST TO DRAW AM LABS.  ADDRESSED WITH PATIENT & EXPLAINED NEED TO CLOSELY MONITOR LABS & REASON WHY.  PATIENT STILL REFUSING.  STATES, "THEY NEED TO COME BACK LATER."  PHLEBOTOMIST OFFERED TO COME BACK IN 1-11/2 HRS APPROXIMATELY AROUND 06:30AM.  PATIENT ASKED IF THIS IS OK BUT DOES NOT ANSWER.  RN REQUESTED FOR LAB DRAWS @ 06:30.    "

## 2020-01-10 NOTE — PROGRESS NOTES
"[facility]  Adult Nutrition  Progress Note    SUMMARY       Recommendations    Recommendation:   1. Continue current diet as tolerated, with fluid restriction per MD.   2. Recommend Novasource Renal TID if PO intake does not improve. If vitamin K is a concern (noting pt was previously on warfarin), suggest Optisource TID (does not contain vitamin K).   3. RD to monitor & follow up.    Goals: Pt to meet % EEN and EPN by RD follow-up.   Nutrition Goal Status: progressing towards goal  Communication of RD Recs: other (comment)(POC)    Reason for Assessment    Reason For Assessment: RD follow-up  Diagnosis: cardiac disease(HF)  Relevant Medical History: HF, CAD  General Information Comments: Pt reports ok appetite but states he has been consuming 40-50% of meals. % intake earlier this admission, with 25-50% intake in the last 2 days per RN documentation. Pt requested grapes and peaches to be added to all meals. Wt has been stable since admission. Pt continues to appear nourished but is at risk for malnutrition if PO intake does not improve.  Nutrition Discharge Planning: Adequate PO intake for optimal nutrition.     Nutrition Risk Screen    Nutrition Risk Screen: no indicators present    Nutrition/Diet History    Spiritual, Cultural Beliefs, Islam Practices, Values that Affect Care: no  Factors Affecting Nutritional Intake: None identified at this time    Anthropometrics    Temp: 98.6 °F (37 °C)  Height Method: Stated  Height: 5' 9" (175.3 cm)  Height (inches): 69 in  Weight Method: Standard Scale  Weight: 127.6 kg (281 lb 4.9 oz)  Weight (lb): 281.31 lb  Ideal Body Weight (IBW), Male: 160 lb  % Ideal Body Weight, Male (lb): 177.89 %  BMI (Calculated): 41.5  BMI Grade: greater than 40 - morbid obesity  Usual Body Weight (UBW), k kg(dry wt)  % Usual Body Weight: 106.24  Weight Loss Since Admission: (fluid related)    Lab/Procedures/Meds    Pertinent Labs Reviewed: " reviewed  Pertinent Labs Comments: , Cr 2.9, GFR 27.3, Glu 132, Mg 2.8, Alb 3.1  Pertinent Medications Reviewed: reviewed  Pertinent Medications Comments: dobutamine, ferrous sulfate, methocarbamol, phenergan, senna-docusate    Estimated/Assessed Needs    Weight Used For Calorie Calculations: 127.6 kg (281 lb 4.9 oz)  Energy Calorie Requirements (kcal): 2639 kcal/day  Energy Need Method: [place]-St Jeor(x 1.25 PAL)  Protein Requirements: 102-128 g/day(0.8-1 g/kg)  Weight Used For Protein Calculations: 127.6 kg (281 lb 4.9 oz)  Fluid Requirements (mL): per MD or 1 mL/kcal     RDA Method (mL): 2639    Nutrition Prescription Ordered    Current Diet Order: Low Na, renal  Nutrition Order Comments: 1000 mL FR  Oral Nutrition Supplement: Boost Plus TID    Evaluation of Received Nutrient/Fluid Intake    I/O: -11.7L since admit  Comments: LBM 1/6  Tolerance: tolerating  % Intake of Estimated Energy Needs: 25 - 50 %  % Meal Intake: 25 - 50 %    Nutrition Risk    Level of Risk/Frequency of Follow-up: low(1X/week)     Assessment and Plan    Nutrition Problem  Inadequate energy intake    Related to (etiology):   Decreased PO intake    Signs and Symptoms (as evidenced by):   Pt consuming 25-50% of meals in the past 2 days (1/8-1/9)    Interventions (treatment strategy):  Collaboration with other providers  Modified diet - cardiac, renal  Commercial beverage - Novasource Renal or Optisource if PO intake does not improve    Nutrition Diagnosis Status:   New    Monitor and Evaluation    Food and Nutrient Intake: energy intake, food and beverage intake  Food and Nutrient Adminstration: diet order  Anthropometric Measurements: weight, weight change  Biochemical Data, Medical Tests and Procedures: other (specify)(All labs)  Nutrition-Focused Physical Findings: overall appearance     Nutrition Follow-Up    RD Follow-up?: Yes

## 2020-01-10 NOTE — PROGRESS NOTES
"[facility]  Palliative Medicine  Progress Note    Patient Name: Hamlet Terrell  MRN: [id number]  Admission Date: 2019  Hospital Length of Stay: 17 days  Code Status: Full Code   Attending Provider: Antoinette Goins MD  Consulting Provider: Vira Davila NP  Primary Care Physician: Carlin Swift MD  Principal Problem:Acute on chronic combined systolic and diastolic congestive heart failure    Patient information was obtained from patient, past medical records and Dr. Goins.      Assessment/Plan:     Palliative care encounter  Palliative care consulted for goals of care discussion/advanced care planning for this 55 y/o male being followed by hospital medicine for Acute on Chronic Combined Systolic and Diastolic Heart Failure, Cardiomyopathy, JEAN-PIERRE on CKD3, Chronic AFib, Essential HTN, CAD, Anemia, Elevated Alk Phos, Abnormal CT, History of MI, History of VTE and History of Stroke. Rounded on patient today, no family members at bedside. Patient is alert, oriented, and receptive to visit. He reports being bored sitting in his room all day with no one to talk to. He expresses appreciation for palliative care and  visits and says, "the more you all come, the better I feel." He complains that PT has been trying to come in the mornings when he is eating breakfast, and says that he still wants PT but they need to come later in the day. He remains unsure about whether he wants to proceed with short term dialysis; he is clear that he would never want long term dialysis even if refusal would result in his death. He is still hoping to have a PICC placed because he is tired of frequent needle sticks.    Plan/Recommendations:  1. See goals of care discussion below.  2. Palliative care will follow up with patient.    Goals of Care/Advance Care Plannin/9 Rounded on patient today, no family members at bedside. Patient is alert, oriented, and receptive to visit. He expresses appreciation for " "palliative care and  visits and says, "the more you all come, the better I feel." He has not made any firm decisions about plans after discharge but is leaning towards enrollment with [facility]. He likes the idea of having his symptoms managed at home so he does not have to keep coming back to the hospital. [facility] will accept the patient on home IV Dobutamine and/or Lasix if needed. He is full code and he is not ready to consider changing code status to DNR, although change in code status is not required for hospice enrollment. He has poor understanding and insight related to his clinical condition and prognosis; he continues to think that he can prove everyone wrong. He remains unsure about whether he wants to proceed with short term dialysis; he is clear that he would never want long term dialysis even if refusal would result in his death.    1/7 Rounded on patient today with BERTA Granado LCSW, no family members at bedside. Patient is alert, oriented, and cooperative with visit but irritable at times. Patient met with [facility] last week and they stopped by today to see him. He remains undecided about hospice but understands that they can help keep him out the hospital after discharge if that is what he wants. He expresses frustration with being in the hospital but also says that he would be willing to come back to the hospital if needed. He does not like the idea of going home on Dobutamine drip but now says that he would be willing as long as he does not have to roll an IV pole around. He says he will carry a "purse" if needed. Nephrology rounded on patient today and discussed possible need for dialysis. Patient is willing to consider trying dialysis for a "week or so" but is adamantly opposed to long-term dialysis. He is familiar with dialysis because his brother is a dialysis patient and says that he can't sit still for that long. He states that he would never want long-term " "dialysis even if he would die without it. At this time he remains full code.    1/6 Rounded on patient today, no family members at bedside. Patient is alert, oriented, and cooperative with visit. He expresses frustration with being in the hospital and has been intermittently refusing IV sticks, lab draws, and vital signs. Reminded patient that he has the option to enroll in hospice if he prefers to discontinue aggressive treatment and focus on comfort. Patient met with [facility] last week but has not yet made any decisions; he says that he is still thinking about it. Discussed the plan for patient to discharge on  drip but patient now says that he does not want to be on a drip when he gets discharged because he is not "toting no bag around." Patient has been told by primary team that he will likely need  drip for the rest of his life but he states that he is going to "prove them wrong." He says that he was told at age 42 that he would die without a defibrillator and he is still here so he has "proved them wrong" before. He also says that he would like to go to the rehab hospital down the street when he leaves here so he can get stronger. His goal remains to "get healthy" and he wishes to remain full code. He has poor understanding and insight related to his clinical condition and prognosis. Will involve palliative care  for assistance with emotional support and coping skills.    1/3 Conference conducted by this APRN with patient to discuss his current clinical status, goals of care, treatment options, code status, long term expected outcomes and prognostic awareness. After a discussion concerning his values and wishes, patient verbalized limited understanding and insight related to his clinical condition and prognostic awareness. His stated goal is to "get healthy." He has been told that he will need to be on  gtt for the rest of his life but still hopes that he can live a long time. " He has never thought about his end of life preferences and whether he would prefer to die at home or at the hospital. Discussed home hospice as an option that would allow him to receive care in the home and provide management of symptoms. Patient is not ready to enroll in hospice at this time but is interested in meeting with a hospice rep to get more information on their services.    Patient has never completed a living will or MPOA. His preference for surrogate decision maker would be his brother Kris [place]. Explained to patient that palliative care can assist with completion of MPOA paperwork whenever he is ready. He is currently a full code and not ready to make any changes to his code status.        I will follow-up with patient. Please contact us if you have any additional questions.    Subjective:     Chief Complaint:   Chief Complaint   Patient presents with    Shortness of Breath     reports fluid retention        HPI:   Mr. Terrell is a 55 y/o male with PMHx of chronic combined systolic and diastolic heart failure (EF 25%), afib on Coumadin, CAD, CVA, and history of PE who presented to [facility] ED on 12/24 for evaluation of shortness of breath and edema. He was just admitted to Hospital Medicine from 11/24-12/8 for CHF exacerbation. During that admission, he was evaluated by Cardiology who started him on Dobutamine and Diuril. He was resistant to HTS evaluation during that stay, which included recommendations for LVAD, transplant, and ICD. He was discharged home on Lasix 80mg PO BID. He endorsed compliance with his diuretic regimen, but despite this and following a low salt diet, he continued to have dyspnea, orthopnea, edema and weight gain. He also complained of mostly dry cough with occasional production of blood tinged sputum.     CXR in the ED showed cardiomegaly and pulmonary edema, along with a masslike opacity. CT chest was ordered to evaluate the mass, but he was unable to lie flat to get it  evaluated. He was given Lasix 80mg IV and was admitted to Hospital Medicine for further management. He was started on Lasix and  gtt.    Hospital Course:  No notes on file    Interval History: Patient has remained on  gtt at 2.5 mcg/kg since admission. He initially received several doses of IV Diuril but diuresis was limited. He responded well after Lasix gtt was increased to 30 mg/hr. Lasix gtt was stopped and patient was started on Lasix IVP on 1/3 but he began to worsen symptomatically. He received IV Diuril on 1/4 and was started back on Lasix gtt on 1/5. He had an episode of epitaxis on 1/4 and required 1 unit PRBC transfusion after Hgb dropped to 6.8. He again received Diuril on 1/6 and 1/7. Nephrology has been following patient for possible short term dialysis but patient has not yet agreed.    Past Medical History:   Diagnosis Date    Anticoagulant long-term use     Cardiomegaly     Chronic combined systolic and diastolic congestive heart failure     Coronary artery disease     Heart attack 2006    Hypertension     Hyperthyroidism, subclinical 1/2/2013    MI (myocardial infarction) 9/22/2013    MI in 2009      Paroxysmal atrial fibrillation     PE (pulmonary embolism) 1/1/2013    IN 2010     S/P ablation of atrial flutter 2008    Stroke 2009    no residual weaknesses       Past Surgical History:   Procedure Laterality Date    COLONOSCOPY N/A 12/2/2019    Procedure: COLONOSCOPY;  Surgeon: Scott Rosario MD;  Location: [address]);  Service: Endoscopy;  Laterality: N/A;    ESOPHAGOGASTRODUODENOSCOPY N/A 12/2/2019    Procedure: EGD (ESOPHAGOGASTRODUODENOSCOPY);  Surgeon: Scott Rosario MD;  Location: [address]);  Service: Endoscopy;  Laterality: N/A;    RADIOFREQUENCY ABLATION  01/08/2008    for atrial flutter       Review of patient's allergies indicates:   Allergen Reactions    Acetaminophen      Itching    Oxycodone-acetaminophen      Other reaction(s): Itching     Ace inhibitors Other (See Comments)     cough       Medications:  Continuous Infusions:   DOBUTamine 2.5 mcg/kg/min (01/09/20 1503)    furosemide (LASIX) 2 mg/mL infusion (non-titrating) 40 mg/hr (01/09/20 1500)     Scheduled Meds:   aspirin  81 mg Oral Daily    ferrous sulfate  325 mg Oral Daily    isosorbide-hydrALAZINE 20-37.5 mg  1 tablet Oral BID    lidocaine  1 patch Transdermal Q24H    methyl salicylate-menthol 15-10%   Topical (Top) TID    metOLazone  10 mg Oral BID    predniSONE  40 mg Oral Daily    warfarin  7.5 mg Oral Daily     PRN Meds:sodium chloride, sodium chloride, acetaminophen, Dextrose 10% Bolus, Dextrose 10% Bolus, diphenhydrAMINE, glucagon (human recombinant), glucose, glucose, melatonin, methocarbamol, morphine, ondansetron, oxyCODONE, oxymetazoline, promethazine (PHENERGAN) IVPB, senna-docusate 8.6-50 mg, sodium chloride, sodium chloride 0.9%, sodium chloride 0.9%, sodium chloride 0.9%    Family History     Problem Relation (Age of Onset)    Alcohol abuse Father    Hypertension Mother, Father, Sister, Brother    Stroke Mother        Tobacco Use    Smoking status: Never Smoker    Smokeless tobacco: Never Used   Substance and Sexual Activity    Alcohol use: No    Drug use: No    Sexual activity: Never       Review of Systems   Constitutional: Negative for appetite change and fatigue.   HENT: Negative for sore throat and trouble swallowing.    Respiratory: Positive for shortness of breath. Negative for cough and wheezing.    Cardiovascular: Positive for leg swelling. Negative for chest pain.   Gastrointestinal: Positive for constipation. Negative for diarrhea, nausea and vomiting.   Genitourinary: Negative for dysuria and hematuria.   Musculoskeletal: Positive for arthralgias (hips) and back pain. Negative for neck pain.   Skin: Negative for rash and wound.   Neurological: Negative for dizziness and light-headedness.   Psychiatric/Behavioral: Negative for confusion and  dysphoric mood. The patient is not nervous/anxious.      Objective:     Vital Signs (Most Recent):  Temp: 97.4 °F (36.3 °C) (01/09/20 1520)  Pulse: 100 (01/09/20 1521)  Resp: 18 (01/09/20 1520)  BP: (!) 124/58 (01/09/20 1520)  SpO2: (!) 90 % (01/09/20 1520) Vital Signs (24h Range):  Temp:  [97.4 °F (36.3 °C)-98.6 °F (37 °C)] 97.4 °F (36.3 °C)  Pulse:  [] 100  Resp:  [16-18] 18  SpO2:  [90 %-98 %] 90 %  BP: (120-128)/(57-77) 124/58     Weight: 127.3 kg (280 lb 10.3 oz)  Body mass index is 41.44 kg/m².    Review of Symptoms  Symptom Assessment (ESAS 0-10 scale)   ESAS 0 1 2 3 4 5 6 7 8 9 10   Pain    X          Dyspnea  X            Anxiety X             Nausea X             Depression  X             Anorexia X             Fatigue X             Insomnia X             Restlessness  X             Agitation X             CAM / Delirium _X_ --  ___+   Constipation     __ --  _X_+   Diarrhea           _X_ --  ___+  Bowel Management Plan (BMP): Yes    Comments: Senna-Docusate ordered BID PRN    Pain Assessment: Location: bilateral hips, legs and back  Quality: shooting  Quantity: 3/10 in intensity  Aggravating factors: worsens with movement or activity  Alleviating factors: improves with Morphine and Oxycodone      OME in 24 hours: 35    Performance Status: 50    Physical Exam   Constitutional: He is oriented to person, place, and time. He appears well-developed and well-nourished.   On Lasix and  gtt   HENT:   Head: Normocephalic and atraumatic.   Eyes: Conjunctivae and EOM are normal.   Neck: Normal range of motion. Neck supple.   Cardiovascular: Normal rate and regular rhythm.   Pulmonary/Chest: Effort normal. No respiratory distress.   Abdominal: Soft. There is no tenderness.   Musculoskeletal: Normal range of motion. He exhibits edema.   Neurological: He is alert and oriented to person, place, and time.   Skin: Skin is warm and dry.   Psychiatric: His behavior is normal. Thought content normal. Cognition and  memory are normal.   Vitals reviewed.      Significant Labs: All pertinent labs within the past 24 hours have been reviewed.  CBC:   Recent Labs   Lab 20  0342   WBC 7.48   HGB 6.4*   HCT 22.1*   MCV 87        BMP:  Recent Labs   Lab 20  0342   *      K 3.7   CL 89*   CO2 33*   *   CREATININE 3.0*   CALCIUM 9.7   MG 2.6     LFT:  Lab Results   Component Value Date    AST 28 2020     (H) 2019    ALKPHOS 201 (H) 2020    BILITOT 1.6 (H) 2020     Albumin:   Albumin   Date Value Ref Range Status   2020 3.1 (L) 3.5 - 5.2 g/dL Final     Protein:   Total Protein   Date Value Ref Range Status   2020 8.3 6.0 - 8.4 g/dL Final     Lactic acid:   Lab Results   Component Value Date    LACTATE 1.0 2019    LACTATE 1.0 2019       Significant Imaging: I have reviewed all pertinent imaging results/findings within the past 24 hours.    Advance Care Planning   Advanced Directives::  Living Will: No  LaPOST: No  Do Not Resuscitate Status: No  Medical Power of : No. Patient identifies brother Kris [place] ([phone]) as his surrogate decision maker.    Decision-Making Capacity: Patient answered questions       Living Arrangements: Lives alone    Psychosocial/Cultural:  Patient is unmarried and has no children. His parents are . He has 5 siblings and a lot of cousins and friends. He lives alone in [place] but says his brother Kris lives right next to him. 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 He says that his goal is to get healthy. He worries about dying.    Spiritual:     F- Melina and Belief: does not identify with a particular Temple but prays to God    I - Importance: somewhat important  .  C - Community: does not belong to a Shinto    A - Address in Care:  visits      > 50% of 25 min visit spent in chart review, face to face discussion of goals of care, symptom  assessment, coordination of care and emotional support.    Vira Davila NP  Palliative Medicine  [facility]

## 2020-01-10 NOTE — PLAN OF CARE
01/10/20 0833   Discharge Reassessment   Assessment Type Discharge Planning Reassessment   Provided patient/caregiver education on the expected discharge date and the discharge plan Yes   Do you have any problems affording any of your prescribed medications? No   Discharge Plan A Hospice/home   Discharge Plan B Home Health   With  gtt

## 2020-01-10 NOTE — PLAN OF CARE
NO ACUTE CHANGES NOTED.  PATIENT ONLY MEDICATED X1 FOR PAIN PER REQUEST.  REQUESTED SNACKS THROUGHOUT THE NIGHT BUT ONLY GIVEN MILK.  PATIENT REQUESTED RN GET HIM CHIPS.  EDUCATED ON SALT INTAKE, HEART FAILURE, & HIGH BP.  LASIX & DOBUTAMINE GTT CONTINUED.  BLISTERS TO BLE STILL PRESENT WITH RLE WEEPING & BLEEDING.  MULTIPLE CALLS FROM TELE MONITOR REPORTING 7-10 BEATS OF VTACH.  JERICHO SORIANO NOTIFIED & BMP ORDERED.  K = 3.9.  CREATININE REMAINS 3.0.  WILL CONTINUE TO MONITOR.  PATIENT REMAINED SITTING UP IN CHAIR, SLEEPING AT INTERVALS THROUGHOUT THE NIGHT.

## 2020-01-10 NOTE — SUBJECTIVE & OBJECTIVE
Interval History: HD discussion with patient this AM, he agreed to do it. RANDI reported. Azotemia worsening.     Review of patient's allergies indicates:   Allergen Reactions    Acetaminophen      Itching    Oxycodone-acetaminophen      Other reaction(s): Itching    Ace inhibitors Other (See Comments)     cough     Current Facility-Administered Medications   Medication Frequency    0.9%  NaCl infusion (for blood administration) Q24H PRN    0.9%  NaCl infusion (for blood administration) Q24H PRN    acetaminophen tablet 650 mg Q4H PRN    aspirin EC tablet 81 mg Daily    dextrose 10% (D10W) Bolus PRN    dextrose 10% (D10W) Bolus PRN    diphenhydrAMINE capsule 25 mg Q6H PRN    DOBUTamine 500mg in D5W 250mL infusion (premix) (NON-TITRATING) Continuous    ferrous sulfate EC tablet 325 mg Daily    furosemide (LASIX) 2 mg/mL in sodium chloride 0.9% 100 mL infusion (conc: 2 mg/mL) Continuous    glucagon (human recombinant) injection 1 mg PRN    glucose chewable tablet 16 g PRN    glucose chewable tablet 24 g PRN    isosorbide-hydrALAZINE 20-37.5 mg per tablet 1 tablet BID    lidocaine 5 % patch 1 patch Q24H    melatonin tablet 6 mg Nightly PRN    methocarbamol tablet 500 mg TID PRN    methyl salicylate-menthol 15-10% cream TID    metOLazone tablet 10 mg BID    morphine injection 4 mg Q6H PRN    ondansetron injection 8 mg Q8H PRN    oxyCODONE immediate release tablet 15 mg Q4H PRN    oxymetazoline 0.05 % nasal spray 2 spray BID PRN    predniSONE tablet 40 mg Daily    promethazine (PHENERGAN) 25 mg in dextrose 5 % 50 mL IVPB Q6H PRN    senna-docusate 8.6-50 mg per tablet 1 tablet BID PRN    sodium chloride 0.65 % nasal spray 1 spray PRN    sodium chloride 0.9% flush 10 mL PRN    sodium chloride 0.9% flush 10 mL PRN    sodium chloride 0.9% flush 5 mL PRN       Objective:     Vital Signs (Most Recent):  Temp: 96 °F (35.6 °C) (01/10/20 0845)  Pulse: (!) 141 (01/10/20 0908)  Resp: 20 (01/10/20  0845)  BP: 127/67 (01/10/20 0845)  SpO2: 95 % (01/10/20 0845)  O2 Device (Oxygen Therapy): room air (01/10/20 0908) Vital Signs (24h Range):  Temp:  [96 °F (35.6 °C)-98.1 °F (36.7 °C)] 96 °F (35.6 °C)  Pulse:  [] 141  Resp:  [16-22] 20  SpO2:  [90 %-95 %] 95 %  BP: (119-130)/(57-74) 127/67     Weight: 127.6 kg (281 lb 4.9 oz) (01/10/20 0501)  Body mass index is 41.54 kg/m².  Body surface area is 2.49 meters squared.    I/O last 3 completed shifts:  In: 2132.9 [P.O.:1680; I.V.:452.9]  Out: 4475 [Urine:4475]    Physical Exam   Constitutional: He is oriented to person, place, and time. No distress.   Sick appearing male   HENT:   Head: Normocephalic and atraumatic.   Right Ear: External ear normal.   Left Ear: External ear normal.   Nose: Nose normal.   Eyes: Pupils are equal, round, and reactive to light. EOM are normal. No scleral icterus.   Neck: JVD present. No tracheal deviation present.   Cardiovascular: Normal rate, regular rhythm and intact distal pulses. Exam reveals gallop.   Pulses:       Radial pulses are 2+ on the right side, and 2+ on the left side.   Pulmonary/Chest: Effort normal. No stridor. He has no wheezes. He has rales (bibasilar).   Abdominal: Soft. There is no tenderness. There is no guarding.   Musculoskeletal: He exhibits edema and tenderness.   Neurological: He is alert and oriented to person, place, and time.   Negative for asterixis, no tremor noted    Skin: Skin is warm and dry. Rash (chronic venous stasis ulceration to the lower extremities bilaterally) noted. He is not diaphoretic.   Psychiatric: He has a normal mood and affect. His behavior is normal.   Nursing note and vitals reviewed.      Significant Labs:  CBC:   Recent Labs   Lab 01/10/20  0736   WBC 9.60   RBC 2.59*   HGB 6.6*   HCT 22.4*      MCV 87   MCH 25.5*   MCHC 29.5*     CMP:   Recent Labs   Lab 01/10/20  0736   *   CALCIUM 10.0   ALBUMIN 3.1*   PROT 8.7*      K 3.8   CO2 35*   CL 89*   *    CREATININE 2.9*   ALKPHOS 200*   ALT 16   AST 28   BILITOT 1.4*     All labs within the past 24 hours have been reviewed.

## 2020-01-10 NOTE — PLAN OF CARE
"[facility]  Palliative Care   Follow Up Note:    Patient Name: Hamlet Terrell  MRN: [id number]  Admission Date: 12/24/2019  Hospital Length of Stay: 17 days  Code Status: Full Code   Attending Provider: Antoinette Goins MD  Palliative Care Provider: Brooke Apple LCSW, [facility]  Primary Care Physician: Carlin Swift MD  Principal Problem:Acute on chronic combined systolic and diastolic congestive heart failure       Visit to bedside. Pt with eyes closed when arrived but opened eyes when I greeted him. Pt stated that he was in a lot of pain today. I asked if he had called then nurse, pt stated that he had while I had been sitting there (which was not true). This SW pressed call button and informed need for pain meds.    Pt expressed frustrations with doctors asking if he wants dialysis. Pt stated that he only wants it in the hospital and does not want it long term. Pt stated that if he knew if this would help his heart he may want it but stated that he doesn't think it will. Pt also stated that he was told that if he gets dialysis and "it worked" then he "could get off of all of this" (pointing to the drips). SW informed him that the doctors were saying that he would need the  drip for the rest of his life, despite dialysis. Pt shook his head no.     Attempted to bring up hospice for plans for discharge. Pt stated that he was in a lot of pain and was not interested in discussing it. Pt stated that his back and butt hurt today and every day.    Pt would benefit from direct communication regarding realistic prognosis and what his options are going forward. SW to continue to follow, provide support and reinforce realistic expectations.  Pt has not been open to include any family members in his plan thus far.     MODESTA spoke with [facility] DORCAS Davila about pt's plan of care. [facility] to continue to follow.     Brooke Apple LCSW, KARL Apple LCSW, " [facility]-SW

## 2020-01-10 NOTE — ASSESSMENT & PLAN NOTE
"Palliative care consulted for goals of care discussion/advanced care planning for this 53 y/o male being followed by hospital medicine for Acute on Chronic Combined Systolic and Diastolic Heart Failure, Cardiomyopathy, JEAN-PIERRE on CKD3, Chronic AFib, Essential HTN, CAD, Anemia, Elevated Alk Phos, Abnormal CT, History of MI, History of VTE and History of Stroke. Rounded on patient today, no family members at bedside. Patient is alert, oriented, and receptive to visit. He reports being bored sitting in his room all day with no one to talk to. He expresses appreciation for palliative care and  visits and says, "the more you all come, the better I feel." He complains that PT has been trying to come in the mornings when he is eating breakfast, and says that he still wants PT but they need to come later in the day. He remains unsure about whether he wants to proceed with short term dialysis; he is clear that he would never want long term dialysis even if refusal would result in his death. He is still hoping to have a PICC placed because he is tired of frequent needle sticks.    Plan/Recommendations:  1. See goals of care discussion below.  2. Palliative care will follow up with patient.    Goals of Care/Advance Care Plannin/9 Rounded on patient today, no family members at bedside. Patient is alert, oriented, and receptive to visit. He expresses appreciation for palliative care and  visits and says, "the more you all come, the better I feel." He has not made any firm decisions about plans after discharge but is leaning towards enrollment with [facility]. He likes the idea of having his symptoms managed at home so he does not have to keep coming back to the hospital. [facility] will accept the patient on home IV Dobutamine and/or Lasix if needed. He is full code and he is not ready to consider changing code status to DNR, although change in code status is not required for hospice enrollment. He " "has poor understanding and insight related to his clinical condition and prognosis; he continues to think that he can prove everyone wrong. He remains unsure about whether he wants to proceed with short term dialysis; he is clear that he would never want long term dialysis even if refusal would result in his death.    1/7 Rounded on patient today with BERTA Granado LCSW, no family members at bedside. Patient is alert, oriented, and cooperative with visit but irritable at times. Patient met with [facility] reps last week and they stopped by today to see him. He remains undecided about hospice but understands that they can help keep him out the hospital after discharge if that is what he wants. He expresses frustration with being in the hospital but also says that he would be willing to come back to the hospital if needed. He does not like the idea of going home on Dobutamine drip but now says that he would be willing as long as he does not have to roll an IV pole around. He says he will carry a "purse" if needed. Nephrology rounded on patient today and discussed possible need for dialysis. Patient is willing to consider trying dialysis for a "week or so" but is adamantly opposed to long-term dialysis. He is familiar with dialysis because his brother is a dialysis patient and says that he can't sit still for that long. He states that he would never want long-term dialysis even if he would die without it. At this time he remains full code.    1/6 Rounded on patient today, no family members at bedside. Patient is alert, oriented, and cooperative with visit. He expresses frustration with being in the hospital and has been intermittently refusing IV sticks, lab draws, and vital signs. Reminded patient that he has the option to enroll in hospice if he prefers to discontinue aggressive treatment and focus on comfort. Patient met with [facility] rep last week but has not yet made any decisions; he says that he is still " "thinking about it. Discussed the plan for patient to discharge on  drip but patient now says that he does not want to be on a drip when he gets discharged because he is not "toting no bag around." Patient has been told by primary team that he will likely need  drip for the rest of his life but he states that he is going to "prove them wrong." He says that he was told at age 42 that he would die without a defibrillator and he is still here so he has "proved them wrong" before. He also says that he would like to go to the rehab hospital down the street when he leaves here so he can get stronger. His goal remains to "get healthy" and he wishes to remain full code. He has poor understanding and insight related to his clinical condition and prognosis. Will involve palliative care  for assistance with emotional support and coping skills.    1/3 Conference conducted by this APRN with patient to discuss his current clinical status, goals of care, treatment options, code status, long term expected outcomes and prognostic awareness. After a discussion concerning his values and wishes, patient verbalized limited understanding and insight related to his clinical condition and prognostic awareness. His stated goal is to "get healthy." He has been told that he will need to be on  gtt for the rest of his life but still hopes that he can live a long time. He has never thought about his end of life preferences and whether he would prefer to die at home or at the hospital. Discussed home hospice as an option that would allow him to receive care in the home and provide management of symptoms. Patient is not ready to enroll in hospice at this time but is interested in meeting with a hospice rep to get more information on their services.    Patient has never completed a living will or MPOA. His preference for surrogate decision maker would be his brother rKis [place]. Explained to patient that palliative care " can assist with completion of MPOA paperwork whenever he is ready. He is currently a full code and not ready to make any changes to his code status.

## 2020-01-10 NOTE — PROGRESS NOTES
[facility]  Nephrology  Progress Note    Patient Name: Hamlet Terrell  MRN: [id number]  Admission Date: 12/24/2019  Hospital Length of Stay: 17 days  Attending Provider: Antoinette Goins MD   Primary Care Physician: Carlin Swift MD  Principal Problem:Acute on chronic combined systolic and diastolic congestive heart failure    Subjective:     HPI: The pt is a 55 yo M with chronic combined HF (EF 25% on 11/27), atrial fibrillation on chronic AC (coumadin), CAD, CVA, and history of PE that presented to the ED with SOB & edema. Nephrology was consulted for assistance with management of cardiorenal syndrome in the setting of JEAN-PIERRE on CKD III now resistive to diuretic management. Baseline Cr appears to be 1.6-1.9. Cr elevated to 3.1 at time of consult. Of note, he was recently admitted to Hospital Medicine from 11/24-12/8 for a CHF exacerbation.  During that admission, he was evaluated by Cardiology who started him on Dobutamine and Diuril.  He was DC'ed home on Lasix 80 mg po BID.  He reports that he was compliant with this regimen, but despite this, he had continued to gain weight, have lower extremity and abdominal swelling, as well as shortness of breath. He was discharged with a weight of 259lbs, and claims that is around his dry weight. He is currently 283 lbs.       Per Dr. Goins:   Patient started on IV diuretics and  infusion for diagnosis of acute on chronic combined heart failure. His weight was stagnant and UOP tapered, so he was transitioned to IVP lasix for IV lasix gtt as this was actually effective during prior admission under guidance from co-management cardiology. However, the patient actually worsened symptomatically and TBili increased along with Cr and weight increase. The patient is now started back on lasix infusion with diuril augmentation. The patient's course is also c/b epistaxis, possibly related to his decompensated picture. He has required pRBC transfusion. He refuses to  apply pressure to the bridge of his nose as he says he cannot breathe through his mouth.  The patient's course is also c/b fever, likely due to pRBC transfusion.     Cardiology consulted for diuretic resistance. Despite two-week hospital stay thus far, he is only down 3 lbs. Nephrology consulted for acute renal failure and consideration of temporary HD for volume removal. Pt continues to exhibit poor insight into his disease. His wishes (to get healthy, ride his bike, walk to the store, return to the hospital if needed) is not on par with hospice. He reported similar feelings to Palliative Care yesterday. Discussed with Palliative Care provider. He is however now amenable for home dobutamine. PICC placement will likely be an issue given his current renal failure. Hb 6.9 secondary to renal failure; pt would like to hold off on transfusion today.    Interval History: HD discussion with patient this AM, he agreed to do it. SHRUTHION reported. Azotemia worsening.     Review of patient's allergies indicates:   Allergen Reactions    Acetaminophen      Itching    Oxycodone-acetaminophen      Other reaction(s): Itching    Ace inhibitors Other (See Comments)     cough     Current Facility-Administered Medications   Medication Frequency    0.9%  NaCl infusion (for blood administration) Q24H PRN    0.9%  NaCl infusion (for blood administration) Q24H PRN    acetaminophen tablet 650 mg Q4H PRN    aspirin EC tablet 81 mg Daily    dextrose 10% (D10W) Bolus PRN    dextrose 10% (D10W) Bolus PRN    diphenhydrAMINE capsule 25 mg Q6H PRN    DOBUTamine 500mg in D5W 250mL infusion (premix) (NON-TITRATING) Continuous    ferrous sulfate EC tablet 325 mg Daily    furosemide (LASIX) 2 mg/mL in sodium chloride 0.9% 100 mL infusion (conc: 2 mg/mL) Continuous    glucagon (human recombinant) injection 1 mg PRN    glucose chewable tablet 16 g PRN    glucose chewable tablet 24 g PRN    isosorbide-hydrALAZINE 20-37.5 mg per tablet 1  tablet BID    lidocaine 5 % patch 1 patch Q24H    melatonin tablet 6 mg Nightly PRN    methocarbamol tablet 500 mg TID PRN    methyl salicylate-menthol 15-10% cream TID    metOLazone tablet 10 mg BID    morphine injection 4 mg Q6H PRN    ondansetron injection 8 mg Q8H PRN    oxyCODONE immediate release tablet 15 mg Q4H PRN    oxymetazoline 0.05 % nasal spray 2 spray BID PRN    predniSONE tablet 40 mg Daily    promethazine (PHENERGAN) 25 mg in dextrose 5 % 50 mL IVPB Q6H PRN    senna-docusate 8.6-50 mg per tablet 1 tablet BID PRN    sodium chloride 0.65 % nasal spray 1 spray PRN    sodium chloride 0.9% flush 10 mL PRN    sodium chloride 0.9% flush 10 mL PRN    sodium chloride 0.9% flush 5 mL PRN       Objective:     Vital Signs (Most Recent):  Temp: 96 °F (35.6 °C) (01/10/20 0845)  Pulse: (!) 141 (01/10/20 0908)  Resp: 20 (01/10/20 0845)  BP: 127/67 (01/10/20 0845)  SpO2: 95 % (01/10/20 0845)  O2 Device (Oxygen Therapy): room air (01/10/20 0908) Vital Signs (24h Range):  Temp:  [96 °F (35.6 °C)-98.1 °F (36.7 °C)] 96 °F (35.6 °C)  Pulse:  [] 141  Resp:  [16-22] 20  SpO2:  [90 %-95 %] 95 %  BP: (119-130)/(57-74) 127/67     Weight: 127.6 kg (281 lb 4.9 oz) (01/10/20 0501)  Body mass index is 41.54 kg/m².  Body surface area is 2.49 meters squared.    I/O last 3 completed shifts:  In: 2132.9 [P.O.:1680; I.V.:452.9]  Out: 4475 [Urine:4475]    Physical Exam   Constitutional: He is oriented to person, place, and time. No distress.   Sick appearing male   HENT:   Head: Normocephalic and atraumatic.   Right Ear: External ear normal.   Left Ear: External ear normal.   Nose: Nose normal.   Eyes: Pupils are equal, round, and reactive to light. EOM are normal. No scleral icterus.   Neck: JVD present. No tracheal deviation present.   Cardiovascular: Normal rate, regular rhythm and intact distal pulses. Exam reveals gallop.   Pulses:       Radial pulses are 2+ on the right side, and 2+ on the left side.    Pulmonary/Chest: Effort normal. No stridor. He has no wheezes. He has rales (bibasilar).   Abdominal: Soft. There is no tenderness. There is no guarding.   Musculoskeletal: He exhibits edema and tenderness.   Neurological: He is alert and oriented to person, place, and time.   Negative for asterixis, no tremor noted    Skin: Skin is warm and dry. Rash (chronic venous stasis ulceration to the lower extremities bilaterally) noted. He is not diaphoretic.   Psychiatric: He has a normal mood and affect. His behavior is normal.   Nursing note and vitals reviewed.      Significant Labs:  CBC:   Recent Labs   Lab 01/10/20  0736   WBC 9.60   RBC 2.59*   HGB 6.6*   HCT 22.4*      MCV 87   MCH 25.5*   MCHC 29.5*     CMP:   Recent Labs   Lab 01/10/20  0736   *   CALCIUM 10.0   ALBUMIN 3.1*   PROT 8.7*      K 3.8   CO2 35*   CL 89*   *   CREATININE 2.9*   ALKPHOS 200*   ALT 16   AST 28   BILITOT 1.4*     All labs within the past 24 hours have been reviewed.         Assessment/Plan:     * Acute on chronic combined systolic and diastolic congestive heart failure  25 yo male with HFrEF (25%), CKD 3, and multiple hospital admissions for heart failure exacerbations presenting this admission for acute on chronic heart failure. Despite aggressive diuresis with Lasix 120mg TID, decreased urinary output. He was later transitioned to lasix gtt at 30 mg/hr with multiple doses of diuril without improvement in UPO. Nephrology consult for volume overload refractory to diuresis.    Plan/Recommendation:  - Continue current regimen with lasix infusion at 40 mg/hr. Reassess daily based on hemodynamics and UF of HD sessions.   - Continue metolazone 10mg BID         Stage 3 chronic kidney disease  Mr. Terrell is a 55 y/o male with HFrEF (last 2D ECHO EF 23%, moderate MR, moderate TR, diastolic dysfunction), hypertension, chronic atrial fibrillation, a flutter status post ablation, CVA in 2009, coronary artery disease,  CKD III, and obstructive sleep apnea presents with ADCHF. Nephro consulted for JEAN-PIERRE on CKD in the setting of Cardiorenal syndrome.   - JEAN-PIERRE was thought to be to be multifactorial from labile BP, and component of cardiorenal syndrome  - Creatinine up trending since admission, baseline 1.6-1.9, currently 3.0   - Azotemia worsening daily. No uremic symptoms present.     Plan/Rec  - Temporary dialysis catheter placement   - Initial HD session for volume removal and metabolic clearance  - Diuretic management can be adjusted accordingly after HD  - Strict I/O and chart  - Avoid nephrotoxic medications, NSAIDs, IV contrast, etc.  - Daily weights and chart  - Medication doses adjusted to GFR  - Maintain MAP > 65  - Hb > 7 gm/dL  - Will follow closely        Thank you for your consult. I will follow-up with patient. Please contact us if you have any additional questions.    Can Spears MD  Nephrology  [facility]

## 2020-01-10 NOTE — NURSING
9082-TELE MONITOR TECH CALLS TO REPORT 10 BEAT VTACH.  CHARGE RN, LANDY NOTIFIED.  LAST LAB DRAW @ 4AM WITH K=3.7, TREATED WITH 40MEQ KCL.    1540-PAGED HOSP MED C.   1147-UPDATED JERICHO SORIANO ABOUT PATIENT CONDITION, & LABS.  NEW ORDERS NOTED.

## 2020-01-10 NOTE — PLAN OF CARE
Recommendations     Recommendation:   1. Continue current diet as tolerated, with fluid restriction per MD.   2. Recommend Novasource Renal TID if PO intake does not improve. If vitamin K is a concern (noting pt was previously on warfarin), suggest Optisource TID (does not contain vitamin K).   3. RD to monitor & follow up.     Goals: Pt to meet % EEN and EPN by RD follow-up.   Nutrition Goal Status: progressing towards goal  Communication of RD Recs: other (comment)(POC)

## 2020-01-11 ENCOUNTER — ANESTHESIA (OUTPATIENT)
Dept: CARDIOLOGY | Facility: HOSPITAL | Age: 55
DRG: 291 | End: 2020-01-11
Payer: MEDICAID

## 2020-01-11 ENCOUNTER — ANESTHESIA EVENT (OUTPATIENT)
Dept: CARDIOLOGY | Facility: HOSPITAL | Age: 55
DRG: 291 | End: 2020-01-11
Payer: MEDICAID

## 2020-01-11 LAB
ALBUMIN SERPL BCP-MCNC: 3.2 G/DL (ref 3.5–5.2)
ALP SERPL-CCNC: 202 U/L (ref 55–135)
ALT SERPL W/O P-5'-P-CCNC: 16 U/L (ref 10–44)
ANION GAP SERPL CALC-SCNC: 18 MMOL/L (ref 8–16)
AST SERPL-CCNC: 28 U/L (ref 10–40)
BASOPHILS # BLD AUTO: 0.04 K/UL (ref 0–0.2)
BASOPHILS NFR BLD: 0.4 % (ref 0–1.9)
BILIRUB SERPL-MCNC: 1.5 MG/DL (ref 0.1–1)
BUN SERPL-MCNC: 132 MG/DL (ref 6–20)
CALCIUM SERPL-MCNC: 10 MG/DL (ref 8.7–10.5)
CHLORIDE SERPL-SCNC: 88 MMOL/L (ref 95–110)
CO2 SERPL-SCNC: 35 MMOL/L (ref 23–29)
CREAT SERPL-MCNC: 2.9 MG/DL (ref 0.5–1.4)
DIFFERENTIAL METHOD: ABNORMAL
EOSINOPHIL # BLD AUTO: 0.4 K/UL (ref 0–0.5)
EOSINOPHIL NFR BLD: 4.2 % (ref 0–8)
ERYTHROCYTE [DISTWIDTH] IN BLOOD BY AUTOMATED COUNT: 22.4 % (ref 11.5–14.5)
EST. GFR  (AFRICAN AMERICAN): 27.3 ML/MIN/1.73 M^2
EST. GFR  (NON AFRICAN AMERICAN): 23.6 ML/MIN/1.73 M^2
GLUCOSE SERPL-MCNC: 119 MG/DL (ref 70–110)
HCT VFR BLD AUTO: 21.9 % (ref 40–54)
HGB BLD-MCNC: 6.3 G/DL (ref 14–18)
IMM GRANULOCYTES # BLD AUTO: 0.08 K/UL (ref 0–0.04)
IMM GRANULOCYTES NFR BLD AUTO: 0.9 % (ref 0–0.5)
INR PPP: 3.4 (ref 0.8–1.2)
LYMPHOCYTES # BLD AUTO: 0.8 K/UL (ref 1–4.8)
LYMPHOCYTES NFR BLD: 8.8 % (ref 18–48)
MAGNESIUM SERPL-MCNC: 2.7 MG/DL (ref 1.6–2.6)
MCH RBC QN AUTO: 25.1 PG (ref 27–31)
MCHC RBC AUTO-ENTMCNC: 28.8 G/DL (ref 32–36)
MCV RBC AUTO: 87 FL (ref 82–98)
MONOCYTES # BLD AUTO: 1.8 K/UL (ref 0.3–1)
MONOCYTES NFR BLD: 19.5 % (ref 4–15)
NEUTROPHILS # BLD AUTO: 6 K/UL (ref 1.8–7.7)
NEUTROPHILS NFR BLD: 66.2 % (ref 38–73)
NRBC BLD-RTO: 0 /100 WBC
PHOSPHATE SERPL-MCNC: 3.9 MG/DL (ref 2.7–4.5)
PLATELET # BLD AUTO: 277 K/UL (ref 150–350)
PMV BLD AUTO: 9.9 FL (ref 9.2–12.9)
POTASSIUM SERPL-SCNC: 3.3 MMOL/L (ref 3.5–5.1)
PROT SERPL-MCNC: 8.3 G/DL (ref 6–8.4)
PROTHROMBIN TIME: 32.4 SEC (ref 9–12.5)
RBC # BLD AUTO: 2.51 M/UL (ref 4.6–6.2)
SODIUM SERPL-SCNC: 141 MMOL/L (ref 136–145)
WBC # BLD AUTO: 9.08 K/UL (ref 3.9–12.7)

## 2020-01-11 PROCEDURE — 36415 COLL VENOUS BLD VENIPUNCTURE: CPT

## 2020-01-11 PROCEDURE — 85025 COMPLETE CBC W/AUTO DIFF WBC: CPT

## 2020-01-11 PROCEDURE — 85610 PROTHROMBIN TIME: CPT

## 2020-01-11 PROCEDURE — 63600175 PHARM REV CODE 636 W HCPCS: Performed by: HOSPITALIST

## 2020-01-11 PROCEDURE — 76937 CENTRAL LINE: ICD-10-PCS | Mod: 26,,, | Performed by: ANESTHESIOLOGY

## 2020-01-11 PROCEDURE — 76937 US GUIDE VASCULAR ACCESS: CPT | Performed by: ANESTHESIOLOGY

## 2020-01-11 PROCEDURE — 36556 CENTRAL LINE: ICD-10-PCS | Mod: ,,, | Performed by: ANESTHESIOLOGY

## 2020-01-11 PROCEDURE — 84100 ASSAY OF PHOSPHORUS: CPT

## 2020-01-11 PROCEDURE — 20600001 HC STEP DOWN PRIVATE ROOM

## 2020-01-11 PROCEDURE — 83735 ASSAY OF MAGNESIUM: CPT

## 2020-01-11 PROCEDURE — 80053 COMPREHEN METABOLIC PANEL: CPT

## 2020-01-11 PROCEDURE — 25000003 PHARM REV CODE 250: Performed by: HOSPITALIST

## 2020-01-11 PROCEDURE — 99233 SBSQ HOSP IP/OBS HIGH 50: CPT | Mod: ,,, | Performed by: HOSPITALIST

## 2020-01-11 PROCEDURE — 99233 PR SUBSEQUENT HOSPITAL CARE,LEVL III: ICD-10-PCS | Mod: ,,, | Performed by: HOSPITALIST

## 2020-01-11 PROCEDURE — 25000003 PHARM REV CODE 250: Performed by: ANESTHESIOLOGY

## 2020-01-11 PROCEDURE — 36556 INSERT NON-TUNNEL CV CATH: CPT | Mod: ,,, | Performed by: ANESTHESIOLOGY

## 2020-01-11 RX ORDER — LIDOCAINE HYDROCHLORIDE 10 MG/ML
INJECTION, SOLUTION EPIDURAL; INFILTRATION; INTRACAUDAL; PERINEURAL
Status: COMPLETED | OUTPATIENT
Start: 2020-01-11 | End: 2020-01-11

## 2020-01-11 RX ORDER — POTASSIUM CHLORIDE 20 MEQ/15ML
20 SOLUTION ORAL
Status: DISPENSED | OUTPATIENT
Start: 2020-01-11 | End: 2020-01-11

## 2020-01-11 RX ADMIN — FUROSEMIDE 40 MG/HR: 10 INJECTION, SOLUTION INTRAMUSCULAR; INTRAVENOUS at 10:01

## 2020-01-11 RX ADMIN — HYDRALAZINE HYDROCHLORIDE AND ISOSORBIDE DINITRATE 1 TABLET: 37.5; 2 TABLET, FILM COATED ORAL at 10:01

## 2020-01-11 RX ADMIN — DOBUTAMINE IN DEXTROSE 2.5 MCG/KG/MIN: 200 INJECTION, SOLUTION INTRAVENOUS at 07:01

## 2020-01-11 RX ADMIN — FUROSEMIDE 40 MG/HR: 10 INJECTION, SOLUTION INTRAMUSCULAR; INTRAVENOUS at 12:01

## 2020-01-11 RX ADMIN — POTASSIUM CHLORIDE 20 MEQ: 20 SOLUTION ORAL at 04:01

## 2020-01-11 RX ADMIN — METOLAZONE 10 MG: 10 TABLET ORAL at 10:01

## 2020-01-11 RX ADMIN — FUROSEMIDE 40 MG/HR: 10 INJECTION, SOLUTION INTRAMUSCULAR; INTRAVENOUS at 06:01

## 2020-01-11 RX ADMIN — ASPIRIN 81 MG: 81 TABLET, COATED ORAL at 10:01

## 2020-01-11 RX ADMIN — OXYCODONE HYDROCHLORIDE 15 MG: 10 TABLET ORAL at 10:01

## 2020-01-11 RX ADMIN — MORPHINE SULFATE 4 MG: 2 INJECTION, SOLUTION INTRAMUSCULAR; INTRAVENOUS at 10:01

## 2020-01-11 RX ADMIN — POTASSIUM CHLORIDE 20 MEQ: 20 SOLUTION ORAL at 02:01

## 2020-01-11 RX ADMIN — FERROUS SULFATE TAB EC 325 MG (65 MG FE EQUIVALENT) 325 MG: 325 (65 FE) TABLET DELAYED RESPONSE at 10:01

## 2020-01-11 RX ADMIN — PREDNISONE 40 MG: 20 TABLET ORAL at 10:01

## 2020-01-11 RX ADMIN — LIDOCAINE HYDROCHLORIDE 20 MG: 10 INJECTION, SOLUTION EPIDURAL; INFILTRATION; INTRACAUDAL; PERINEURAL at 09:01

## 2020-01-11 RX ADMIN — OXYCODONE HYDROCHLORIDE 15 MG: 10 TABLET ORAL at 07:01

## 2020-01-11 RX ADMIN — POTASSIUM CHLORIDE 20 MEQ: 20 SOLUTION ORAL at 11:01

## 2020-01-11 NOTE — NURSING
Patient had 17 beat run of vtach. Patient asleep in chair; asymptomatic. MD Marcela notified. MD awaiting lab results; no new orders at this time. Will continue to monitor.

## 2020-01-11 NOTE — ASSESSMENT & PLAN NOTE
Mr. Terrell is a 55 y/o male with HFrEF (last 2D ECHO EF 23%, moderate MR, moderate TR, diastolic dysfunction), hypertension, chronic atrial fibrillation, a flutter status post ablation, CVA in 2009, coronary artery disease, CKD III, and obstructive sleep apnea presents with ADCHF. Nephro consulted for JEAN-PIERRE on CKD in the setting of Cardiorenal syndrome.   - JEAN-PIERRE was thought to be to be multifactorial from labile BP, and component of cardiorenal syndrome  - Creatinine up trending since admission, baseline 1.6-1.9, currently 3.0   - Azotemia worsening daily. No uremic symptoms present.     Plan/Rec  - Temporary fem dialysis catheter placement   - Initial HD session for volume removal and metabolic clearance  - Diuretic management can be adjusted accordingly after HD  - Strict I/O and chart  - Avoid nephrotoxic medications, NSAIDs, IV contrast, etc.  - Daily weights and chart  - Medication doses adjusted to GFR  - Maintain MAP > 65  - Hb > 7 gm/dL  - Will follow closely

## 2020-01-11 NOTE — NURSING
Patient had 6 beat run of vtach. [facility] paged. Spoke with MD Marcela. No new orders placed at this time. Will continue to monitor.

## 2020-01-11 NOTE — PLAN OF CARE
Pt experienced no falls during stay, no s/s noted of infection, Pt encouraged to move around, Pt not ready for transition of care. Pt verbalizes understanding.

## 2020-01-11 NOTE — PLAN OF CARE
Problem: Fall Injury Risk  Goal: Absence of Fall and Fall-Related Injury  Outcome: Ongoing, Progressing     Problem: Adult Inpatient Plan of Care  Goal: Plan of Care Review  Outcome: Ongoing, Progressing     Problem: Skin Injury Risk Increased  Goal: Skin Health and Integrity  Outcome: Ongoing, Progressing     Patient remains free from falls and injuries through out shift. Patient AAO and VSS. Patient denies chest pain and SOB. Patient continues on Dobutamine and Lasix. Plan of care reviewed with patient. Patient verbalizes understanding of plan.  Will continue to monitor.

## 2020-01-11 NOTE — PROGRESS NOTES
Progress Note  Hospital Medicine    [place]    [facility] [place] [place]  [facility] [place] [place]  Admit Date: 12/24/2019  Encounter Date: 01/11/2020     SUBJECTIVE:     Follow-up Visit for: Acute on chronic combined systolic and diastolic congestive heart failure    HPI (See H&P for complete P,F,SHx):  54M with chronic combined systolic and diastolic heart failure (EF 25%), afib on Coumadin, CAD, CVA, and history of PE who presents to the ED for evaluation of shortness of breath and edema.  He was just admitted to Hospital Medicine from 11/24-12/8 for a CHF exacerbation.  During that admission, he was evaluated by Cardiology who started him on Dobutamine and Diuril.  He was resistant to HTS evaluation during that stay, which included recommendations for LVAD, transplant, and ICD.  He was discharged home on Lasix 80mg PO BID.  He endorses compliance with his diuretic regimen, but despite this and following a low salt diet, he has continued to gain weight, have lower extremity and abdominal swelling, as well as shortness of breath.  He is so short of breath that he has to sleep in a chair, and has been unable to sleep because of his breathing.  He denies any chest pain.  He does have a cough that is mostly dry, but occasionally productive of blood tinged sputum.  He was discharged weight a weight of 259lbs, and claims that is around his dry weight.  He is currently 286lbs.       Upon arrival to the ED, vitals were /83, HR 88, RR 22.  Labs showed Hemoglobin 7.3, INR 1.5, Creatinine 1.8, Alk Phos 278,  with Trop 0.128. CXR showed cardiomegaly and pulmonary edema, along with a masslike opacity. CT chest was attempted to be ordered to evaluate the mass, but he was unable to lie flat to get it evaluated.  He was given Lasix 80mg IV and was admitted to Hospital Medicine for further management.    TTE 11/27/2019  · Severely decreased LVSF.  · The estimated ejection fraction is 25%  · Concentric left  ventricular hypertrophy.  · Global hypokinetic wall motion.  · Moderate right ventricular enlargement.  · Moderately reduced RVSF.  · Severe biatrial enlargement.  · Moderate mitral regurgitation.  · Moderate tricuspid regurgitation.  · The estimated PA systolic pressure is 40 mm Hg  · Elevated central venous pressure (15 mm Hg).  · Atrial fibrillation observed.    Hospital course:  Patient started on IV diuretics and  infusion for diagnosis of acute on chronic combined heart failure. His weight was stagnant and UOP tapered, so he was transitioned to IVP lasix for IV lasix gtt as this was actually effective during prior admission under guidance from co-management cardiology. However, the patient actually worsened symptomatically and TBili increased along with Cr and weight increase. The patient is now started back on lasix infusion with diuril augmentation. The patient's course is also c/b epistaxis, possibly related to his decompensated picture. He has required pRBC transfusion.     Interval history:  Pt reports not feeling well today. He is amenable for femoral line placement for dialysis today. Discussed w/ Anesthesia who will place the line today.     Wt Readings from Last 30 Encounters:   01/11/20 125.5 kg (276 lb 10.8 oz)   12/07/19 117.6 kg (259 lb 4.2 oz)   10/23/19 108.9 kg (240 lb)   09/09/19 108.9 kg (240 lb)   08/04/19 105.2 kg (232 lb)   07/18/19 108.2 kg (238 lb 8.6 oz)   06/20/19 118.9 kg (262 lb 2 oz)   05/21/19 121.1 kg (267 lb)   05/16/19 116.3 kg (256 lb 6.3 oz)   04/20/19 127.5 kg (281 lb 1.4 oz)   01/31/19 113.4 kg (250 lb)   11/04/18 123.1 kg (271 lb 6.2 oz)   08/31/18 119.6 kg (263 lb 10.7 oz)   07/28/18 113.4 kg (250 lb)   07/10/18 120.7 kg (266 lb)   05/03/18 117.9 kg (259 lb 14.8 oz)   03/30/18 117.9 kg (260 lb)   02/21/18 117.9 kg (260 lb)   01/12/18 113.4 kg (250 lb)   12/10/17 117.9 kg (260 lb)   11/11/17 113.9 kg (251 lb)   11/01/17 111.8 kg (246 lb 7.6 oz)   10/26/17 118.5 kg (261  lb 3.2 oz)   08/13/17 117.9 kg (260 lb)   08/12/17 117.9 kg (260 lb)   05/10/17 117.9 kg (260 lb)   04/05/17 108.4 kg (239 lb 1.4 oz)   02/01/17 108.9 kg (240 lb)   12/15/16 119.4 kg (263 lb 3.7 oz)   08/26/16 112.5 kg (248 lb)     Review of Systems:  Review of Systems   Constitutional: Negative for chills and fever.   HENT: Negative for congestion and sore throat.    Eyes: Negative for photophobia, pain and discharge.   Respiratory: Positive for shortness of breath. Negative for cough, hemoptysis and sputum production.    Cardiovascular: Positive for orthopnea, leg swelling and PND. Negative for chest pain and palpitations.   Gastrointestinal: Negative for abdominal pain, diarrhea, nausea and vomiting.   Genitourinary: Negative for dysuria and urgency.   Musculoskeletal: Negative for myalgias and neck pain.   Skin: Negative for itching and rash.   Neurological: Negative for sensory change, focal weakness and headaches.   Endo/Heme/Allergies: Negative for polydipsia. Does not bruise/bleed easily.   Psychiatric/Behavioral: Negative for depression and suicidal ideas.     Past Medical History:   Diagnosis Date    Anticoagulant long-term use     Cardiomegaly     Chronic combined systolic and diastolic congestive heart failure     Coronary artery disease     Heart attack 2006    Hypertension     Hyperthyroidism, subclinical 1/2/2013    MI (myocardial infarction) 9/22/2013    MI in 2009      Paroxysmal atrial fibrillation     PE (pulmonary embolism) 1/1/2013    IN 2010     S/P ablation of atrial flutter 2008    Stroke 2009    no residual weaknesses     Social History     Socioeconomic History    Marital status: Single     Spouse name: Not on file    Number of children: Not on file    Years of education: Not on file    Highest education level: Not on file   Occupational History    Not on file   Social Needs    Financial resource strain: Not on file    Food insecurity:     Worry: Not on file      "Inability: Not on file    Transportation needs:     Medical: Not on file     Non-medical: Not on file   Tobacco Use    Smoking status: Never Smoker    Smokeless tobacco: Never Used   Substance and Sexual Activity    Alcohol use: No    Drug use: No    Sexual activity: Never   Lifestyle    Physical activity:     Days per week: Not on file     Minutes per session: Not on file    Stress: Not on file   Relationships    Social connections:     Talks on phone: Not on file     Gets together: Not on file     Attends [employer] service: Not on file     Active member of club or organization: Not on file     Attends meetings of clubs or organizations: Not on file     Relationship status: Not on file   Other Topics Concern    Not on file   Social History Narrative    Not on file       OBJECTIVE:       Intake/Output Summary (Last 24 hours) at 1/11/2020 1303  Last data filed at 1/11/2020 0600  Gross per 24 hour   Intake 890 ml   Output 900 ml   Net -10 ml     Vital Signs Range (Last 24H):  Temp:  [97.6 °F (36.4 °C)-98.8 °F (37.1 °C)]   Pulse:  []   Resp:  [16-20]   BP: (118-127)/(56-80)   SpO2:  [90 %-98 %]   Body mass index is 40.86 kg/m².    Objective:  General Appearance:  Comfortable, well-appearing, in no acute distress and not in pain.    Vital signs: (most recent): Blood pressure (!) 125/58, pulse 106, temperature 98.8 °F (37.1 °C), temperature source Oral, resp. rate 17, height 5' 9" (1.753 m), weight 125.5 kg (276 lb 10.8 oz), SpO2 (!) 90 %.  No fever.    Output: Producing urine and producing stool.    HEENT: Normal HEENT exam.  (+JVD)    Lungs:  Normal effort.  Breath sounds clear to auscultation.  He is not in respiratory distress.  There are rales.  No wheezes.    Heart: Normal rate.  Regular rhythm.  S1 normal and S2 normal.  Positive for murmur.    Chest: Symmetric chest wall expansion.   Abdomen: Abdomen is soft.  Bowel sounds are normal.   There is no abdominal tenderness.     Extremities: Normal " range of motion.  There is venous stasis and dependent edema.  There is no deformity, effusion or local swelling.    Pulses: Distal pulses are intact.    Neurological: Patient is alert and oriented to person, place and time.  Normal strength.    Pupils:  Pupils are equal, round, and reactive to light.    Skin:  Warm and dry.      Medications:  Medication list was reviewed in EPIC and changes noted under Assessment/Plan and MAR.    Laboratory:  Recent Labs     01/11/20 0428   WBC 9.08   RBC 2.51*   HGB 6.3*   HCT 21.9*      MCV 87   MCH 25.1*   MCHC 28.8*   GRAN 66.2  6.0   LYMPH 8.8*  0.8*   MONO 19.5*  1.8*   EOS 0.4      Recent Labs     01/11/20 0427   *      K 3.3*   CL 88*   CO2 35*   *   CREATININE 2.9*   CALCIUM 10.0   ANIONGAP 18*   MG 2.7*   PHOS 3.9     Prior records reviewed.    ECHO reviewed:  · Severely decreased left ventricular systolic function. The estimated ejection fraction is 25%  · Concentric left ventricular hypertrophy.  · Global hypokinetic wall motion.  · Moderate right ventricular enlargement.  · Moderately reduced right ventricular systolic function.  · Severe biatrial enlargement.  · Moderate mitral regurgitation.  · Moderate tricuspid regurgitation.  · The estimated PA systolic pressure is 40 mm Hg  · Elevated central venous pressure (15 mm Hg).  · Atrial fibrillation observed.      Imaging reviewed  Imaging Results          CT Chest Without Contrast (No Result on File)                X-Ray Chest AP Portable (Final result)  Result time 12/24/19 19:09:07    Final result by Zeb Hirsch MD (12/24/19 19:09:07)                 Impression:      Masslike opacity overlying the right lower lung zone, similar to prior exam.  Right pleural fluid not excluded.  Chest CT recommended for further evaluation of right lung masslike opacity.    Patchy bilateral pulmonary opacities, similar to prior exam.    Cardiomegaly.    Electronically signed by resident: Cristofer  April  Date:    12/24/2019  Time:    18:48    Electronically signed by: Zeb Hirsch MD  Date:    12/24/2019  Time:    19:09             Narrative:    EXAMINATION:  XR CHEST AP PORTABLE    CLINICAL HISTORY:  CHF;    TECHNIQUE:  Single frontal view of the chest was performed.    COMPARISON:  Chest radiograph 12/03/2019    FINDINGS:  Cardiac leads overlie the chest wall.    Redemonstration of extensive bilateral patchy opacities, more prominent on the right with a focal area of masslike opacity in the right lower lung zone.  No significant change from prior exam.  Left costophrenic angle is visible and right sided pleural fluid not excluded noting presence of opacification.  No pneumothorax.    The cardiac silhouette is enlarged.  Hilar and mediastinal contours are unchanged.    Bones are intact.                                  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 [I13.0]  Yes    Elevated alkaline phosphatase level [R74.8]  Yes    Coronary artery disease [I25.10]  Yes     Chronic    Stage 3 chronic kidney disease [N18.3]  Yes    Subtherapeutic international normalized ratio (INR) [R79.1]  Yes    Personal history of cerebral embolism [Z86.79]  Not Applicable    Personal history of venous thrombosis and embolism [Z86.718]  Not Applicable    Personal history of MI (myocardial infarction) [I25.2]  Not Applicable    Essential hypertension [I10]  Yes     Chronic    Atrial fibrillation, chronic [I48.20]  Yes     Chronic    Chronic anticoagulation [Z79.01]  Not Applicable     Chronic    Cardiomyopathy [I42.9]  Yes     Chronic      Resolved Hospital  Problems   No resolved problems to display.      Acute on Chronic Combined Systolic and Diastolic Heart Failure  Cardiomyopathy  · CHF Pathway initiated  · Last 2D echo Nov 2019 with EF 25%  ·  and CXR with pulmonary edema and cardiomegaly  · Started on Lasix gtt, increased to 40 mg/hr. Failed transition to high dose IV Lasix pushes. Back on Lasix gtt  · BID Metolazone 10 mg per Nephro recs  · Cont  gtt 2.5, which will be continued at home. Will need long-term IV placement when he nears discharge. PICC placement may be an issue given his renal failure   · Cardiology Co-mgmt consulted. Recommend Nephro consult for temporary HD for volume removal due to diuretic resistance  · Goal diuresis 2-3L/day.  Home diuretic dose:  Lasix 80mg PO BID  · BID BMP (with morning labs and at 4pm)  · Strict I&Os with daily weights.  · Hold BB, okay to c/w BiDil     JEAN-PIERRE on CKD3  · Baseline 1.8  · Cardiorenal. Not improving as patient is not diuresing well  · Nephrology consult as above - needs temporary HD for volume removal. Will be initiated today after line placed as pt is now amenable    Chronic AFib  Long Term Use of Anticoagulants  Supratherapeutic INR  · Chronic issue  · HR in 100-120's  · INR initially subtherapeutic. Dose increased to 7.5mg. Now supratherapeutic. Hold coumadin until improved.   · Pharmacy consulted for dosing management     Essential HTN  · Chronic and stable  · Continue Bidil BID     CAD  History of MI  · Chronic and stable  · Continue Aspirin 81mg PO daily  · Continue Bidil BID     Anemia  · Received 5 units PRBCs on prior hospital admission  · Continue PO iron  · Type and screen obtained  · Monitor for bleeding/need for transfusion  · Transfused 1U pRBC after episode of epistaxis noted. Refusing further transfusions at this time     History of VTE  History of Stroke  · Chronic and stable  · Continue Aspirin 81mg PO daily  · Continue Warfarin when INR appropriate      Elevated Alk Phos  · Chronic  and stable  · Monitor     Benign Hypertensive Heart and Kidney Disease with HF and CKD  · As above     Abnormal CT  · Needs CT chest when able to lie flat or outpt to evaluate masslike opacity seen on CXR     Diet:  Low Sodium, 1 L fluid restriction  VTE PPx:  Warfarin  Goals of Care:  Full     High Risk Conditions:   Patient is currently on drug therapy requiring intensive monitoring for toxicity: Lasix gtt    Anticipated discharge date and disposition:   Pending volume removal/diuresis. Home hospice on discharge. Pt w/ very poor insight into disease process and will likely continue to be readmitted to the hospital    Antoinette Goins MD  [facility]

## 2020-01-11 NOTE — SUBJECTIVE & OBJECTIVE
Interval History: Pt seen and examined at bedside. CVC attempted yesterday, but patient reportedly refused due to discomfort with lying flat. Today he states he would be amenable to CVC placement and HD. Primary team coordinating line placement with anesthesia.     Review of patient's allergies indicates:   Allergen Reactions    Acetaminophen      Itching    Oxycodone-acetaminophen      Other reaction(s): Itching    Ace inhibitors Other (See Comments)     cough     Current Facility-Administered Medications   Medication Frequency    0.9%  NaCl infusion (for blood administration) Q24H PRN    0.9%  NaCl infusion (for blood administration) Q24H PRN    0.9%  NaCl infusion PRN    0.9%  NaCl infusion Once    acetaminophen tablet 650 mg Q4H PRN    aspirin EC tablet 81 mg Daily    dextrose 10% (D10W) Bolus PRN    dextrose 10% (D10W) Bolus PRN    diphenhydrAMINE capsule 25 mg Q6H PRN    DOBUTamine 500mg in D5W 250mL infusion (premix) (NON-TITRATING) Continuous    ferrous sulfate EC tablet 325 mg Daily    furosemide (LASIX) 2 mg/mL in sodium chloride 0.9% 100 mL infusion (conc: 2 mg/mL) Continuous    glucagon (human recombinant) injection 1 mg PRN    glucose chewable tablet 16 g PRN    glucose chewable tablet 24 g PRN    isosorbide-hydrALAZINE 20-37.5 mg per tablet 1 tablet BID    lidocaine 5 % patch 1 patch Q24H    melatonin tablet 6 mg Nightly PRN    methocarbamol tablet 500 mg TID PRN    methyl salicylate-menthol 15-10% cream TID    metOLazone tablet 10 mg BID    morphine injection 4 mg Q6H PRN    ondansetron injection 8 mg Q8H PRN    oxyCODONE immediate release tablet 15 mg Q4H PRN    potassium chloride 10% oral solution 20 mEq Q2H    predniSONE tablet 40 mg Daily    promethazine (PHENERGAN) 25 mg in dextrose 5 % 50 mL IVPB Q6H PRN    senna-docusate 8.6-50 mg per tablet 1 tablet BID PRN    sodium chloride 0.65 % nasal spray 1 spray PRN    sodium chloride 0.9% flush 10 mL PRN    sodium  chloride 0.9% flush 10 mL PRN    sodium chloride 0.9% flush 5 mL PRN       Objective:     Vital Signs (Most Recent):  Temp: 98.8 °F (37.1 °C) (01/11/20 1300)  Pulse: 106 (01/11/20 1300)  Resp: 17 (01/11/20 1300)  BP: (!) 125/58 (01/11/20 1300)  SpO2: (!) 90 % (01/11/20 1300)  O2 Device (Oxygen Therapy): room air (01/10/20 1639) Vital Signs (24h Range):  Temp:  [97.6 °F (36.4 °C)-98.8 °F (37.1 °C)] 98.8 °F (37.1 °C)  Pulse:  [] 106  Resp:  [16-20] 17  SpO2:  [90 %-98 %] 90 %  BP: (118-127)/(56-80) 125/58     Weight: 125.5 kg (276 lb 10.8 oz) (01/11/20 1124)  Body mass index is 40.86 kg/m².  Body surface area is 2.47 meters squared.    I/O last 3 completed shifts:  In: 2544.1 [P.O.:2210; I.V.:334.1]  Out: 3125 [Urine:3125]    Physical Exam   Constitutional: He is oriented to person, place, and time. No distress.   Sick appearing male   HENT:   Head: Normocephalic and atraumatic.   Right Ear: External ear normal.   Left Ear: External ear normal.   Nose: Nose normal.   Eyes: Pupils are equal, round, and reactive to light. EOM are normal. No scleral icterus.   Neck: JVD present. No tracheal deviation present.   Cardiovascular: Normal rate, regular rhythm and intact distal pulses. Exam reveals gallop.   Pulses:       Radial pulses are 2+ on the right side, and 2+ on the left side.   Pulmonary/Chest: Effort normal. No stridor. He has no wheezes. He has rales (bibasilar).   Abdominal: Soft. There is no tenderness. There is no guarding.   Musculoskeletal: He exhibits edema and tenderness.   Neurological: He is alert and oriented to person, place, and time.   asterixis   Skin: Skin is warm and dry. Rash (chronic venous stasis ulceration to the lower extremities bilaterally) noted. He is not diaphoretic.   Psychiatric: He has a normal mood and affect. His behavior is normal.   Nursing note and vitals reviewed.      Significant Labs:  CBC:   Recent Labs   Lab 01/11/20  0428   WBC 9.08   RBC 2.51*   HGB 6.3*   HCT 21.9*       MCV 87   MCH 25.1*   MCHC 28.8*     CMP:   Recent Labs   Lab 01/11/20  0427   *   CALCIUM 10.0   ALBUMIN 3.2*   PROT 8.3      K 3.3*   CO2 35*   CL 88*   *   CREATININE 2.9*   ALKPHOS 202*   ALT 16   AST 28   BILITOT 1.5*     All labs within the past 24 hours have been reviewed.

## 2020-01-11 NOTE — PROGRESS NOTES
[facility]  Nephrology  Progress Note    Patient Name: Hamlet Terrell  MRN: [id number]  Admission Date: 12/24/2019  Hospital Length of Stay: 18 days  Attending Provider: Antoinette Goins MD   Primary Care Physician: Carlin Swift MD  Principal Problem:Acute on chronic combined systolic and diastolic congestive heart failure    Subjective:     HPI: The pt is a 55 yo M with chronic combined HF (EF 25% on 11/27), atrial fibrillation on chronic AC (coumadin), CAD, CVA, and history of PE that presented to the ED with SOB & edema. Nephrology was consulted for assistance with management of cardiorenal syndrome in the setting of JEAN-PIERRE on CKD III now resistive to diuretic management. Baseline Cr appears to be 1.6-1.9. Cr elevated to 3.1 at time of consult. Of note, he was recently admitted to Hospital Medicine from 11/24-12/8 for a CHF exacerbation.  During that admission, he was evaluated by Cardiology who started him on Dobutamine and Diuril.  He was DC'ed home on Lasix 80 mg po BID.  He reports that he was compliant with this regimen, but despite this, he had continued to gain weight, have lower extremity and abdominal swelling, as well as shortness of breath. He was discharged with a weight of 259lbs, and claims that is around his dry weight. He is currently 283 lbs.       Per Dr. Goins:   Patient started on IV diuretics and  infusion for diagnosis of acute on chronic combined heart failure. His weight was stagnant and UOP tapered, so he was transitioned to IVP lasix for IV lasix gtt as this was actually effective during prior admission under guidance from co-management cardiology. However, the patient actually worsened symptomatically and TBili increased along with Cr and weight increase. The patient is now started back on lasix infusion with diuril augmentation. The patient's course is also c/b epistaxis, possibly related to his decompensated picture. He has required pRBC transfusion. He refuses to  apply pressure to the bridge of his nose as he says he cannot breathe through his mouth.  The patient's course is also c/b fever, likely due to pRBC transfusion.     Cardiology consulted for diuretic resistance. Despite two-week hospital stay thus far, he is only down 3 lbs. Nephrology consulted for acute renal failure and consideration of temporary HD for volume removal. Pt continues to exhibit poor insight into his disease. His wishes (to get healthy, ride his bike, walk to the store, return to the hospital if needed) is not on par with hospice. He reported similar feelings to Palliative Care yesterday. Discussed with Palliative Care provider. He is however now amenable for home dobutamine. PICC placement will likely be an issue given his current renal failure. Hb 6.9 secondary to renal failure; pt would like to hold off on transfusion today.    Interval History: Pt seen and examined at bedside. CVC attempted yesterday, but patient reportedly refused due to discomfort with lying flat. Today he states he would be amenable to CVC placement and HD. Primary team coordinating line placement with anesthesia.     Review of patient's allergies indicates:   Allergen Reactions    Acetaminophen      Itching    Oxycodone-acetaminophen      Other reaction(s): Itching    Ace inhibitors Other (See Comments)     cough     Current Facility-Administered Medications   Medication Frequency    0.9%  NaCl infusion (for blood administration) Q24H PRN    0.9%  NaCl infusion (for blood administration) Q24H PRN    0.9%  NaCl infusion PRN    0.9%  NaCl infusion Once    acetaminophen tablet 650 mg Q4H PRN    aspirin EC tablet 81 mg Daily    dextrose 10% (D10W) Bolus PRN    dextrose 10% (D10W) Bolus PRN    diphenhydrAMINE capsule 25 mg Q6H PRN    DOBUTamine 500mg in D5W 250mL infusion (premix) (NON-TITRATING) Continuous    ferrous sulfate EC tablet 325 mg Daily    furosemide (LASIX) 2 mg/mL in sodium chloride 0.9% 100 mL  infusion (conc: 2 mg/mL) Continuous    glucagon (human recombinant) injection 1 mg PRN    glucose chewable tablet 16 g PRN    glucose chewable tablet 24 g PRN    isosorbide-hydrALAZINE 20-37.5 mg per tablet 1 tablet BID    lidocaine 5 % patch 1 patch Q24H    melatonin tablet 6 mg Nightly PRN    methocarbamol tablet 500 mg TID PRN    methyl salicylate-menthol 15-10% cream TID    metOLazone tablet 10 mg BID    morphine injection 4 mg Q6H PRN    ondansetron injection 8 mg Q8H PRN    oxyCODONE immediate release tablet 15 mg Q4H PRN    potassium chloride 10% oral solution 20 mEq Q2H    predniSONE tablet 40 mg Daily    promethazine (PHENERGAN) 25 mg in dextrose 5 % 50 mL IVPB Q6H PRN    senna-docusate 8.6-50 mg per tablet 1 tablet BID PRN    sodium chloride 0.65 % nasal spray 1 spray PRN    sodium chloride 0.9% flush 10 mL PRN    sodium chloride 0.9% flush 10 mL PRN    sodium chloride 0.9% flush 5 mL PRN       Objective:     Vital Signs (Most Recent):  Temp: 98.8 °F (37.1 °C) (01/11/20 1300)  Pulse: 106 (01/11/20 1300)  Resp: 17 (01/11/20 1300)  BP: (!) 125/58 (01/11/20 1300)  SpO2: (!) 90 % (01/11/20 1300)  O2 Device (Oxygen Therapy): room air (01/10/20 1639) Vital Signs (24h Range):  Temp:  [97.6 °F (36.4 °C)-98.8 °F (37.1 °C)] 98.8 °F (37.1 °C)  Pulse:  [] 106  Resp:  [16-20] 17  SpO2:  [90 %-98 %] 90 %  BP: (118-127)/(56-80) 125/58     Weight: 125.5 kg (276 lb 10.8 oz) (01/11/20 1124)  Body mass index is 40.86 kg/m².  Body surface area is 2.47 meters squared.    I/O last 3 completed shifts:  In: 2544.1 [P.O.:2210; I.V.:334.1]  Out: 3125 [Urine:3125]    Physical Exam   Constitutional: He is oriented to person, place, and time. No distress.   Sick appearing male   HENT:   Head: Normocephalic and atraumatic.   Right Ear: External ear normal.   Left Ear: External ear normal.   Nose: Nose normal.   Eyes: Pupils are equal, round, and reactive to light. EOM are normal. No scleral icterus.   Neck:  JVD present. No tracheal deviation present.   Cardiovascular: Normal rate, regular rhythm and intact distal pulses. Exam reveals gallop.   Pulses:       Radial pulses are 2+ on the right side, and 2+ on the left side.   Pulmonary/Chest: Effort normal. No stridor. He has no wheezes. He has rales (bibasilar).   Abdominal: Soft. There is no tenderness. There is no guarding.   Musculoskeletal: He exhibits edema and tenderness.   Neurological: He is alert and oriented to person, place, and time.   asterixis   Skin: Skin is warm and dry. Rash (chronic venous stasis ulceration to the lower extremities bilaterally) noted. He is not diaphoretic.   Psychiatric: He has a normal mood and affect. His behavior is normal.   Nursing note and vitals reviewed.      Significant Labs:  CBC:   Recent Labs   Lab 01/11/20 0428   WBC 9.08   RBC 2.51*   HGB 6.3*   HCT 21.9*      MCV 87   MCH 25.1*   MCHC 28.8*     CMP:   Recent Labs   Lab 01/11/20 0427   *   CALCIUM 10.0   ALBUMIN 3.2*   PROT 8.3      K 3.3*   CO2 35*   CL 88*   *   CREATININE 2.9*   ALKPHOS 202*   ALT 16   AST 28   BILITOT 1.5*     All labs within the past 24 hours have been reviewed.         Assessment/Plan:     Stage 3 chronic kidney disease  Mr. Terrell is a 55 y/o male with HFrEF (last 2D ECHO EF 23%, moderate MR, moderate TR, diastolic dysfunction), hypertension, chronic atrial fibrillation, a flutter status post ablation, CVA in 2009, coronary artery disease, CKD III, and obstructive sleep apnea presents with ADCHF. Nephro consulted for JEAN-PIERRE on CKD in the setting of Cardiorenal syndrome.   - JEAN-PIERRE was thought to be to be multifactorial from labile BP, and component of cardiorenal syndrome  - Creatinine up trending since admission, baseline 1.6-1.9, currently 3.0   - Azotemia worsening daily. No uremic symptoms present.     Plan/Rec  - Temporary fem dialysis catheter placement   - Initial HD session for volume removal and metabolic  clearance  - Diuretic management can be adjusted accordingly after HD  - Strict I/O and chart  - Avoid nephrotoxic medications, NSAIDs, IV contrast, etc.  - Daily weights and chart  - Medication doses adjusted to GFR  - Maintain MAP > 65  - Hb > 7 gm/dL  - Will follow closely        Thank you for your consult. I will follow-up with patient. Please contact us if you have any additional questions.    Catarina Blackwell MD  Nephrology  [facility]

## 2020-01-12 LAB
ALBUMIN SERPL BCP-MCNC: 3.3 G/DL (ref 3.5–5.2)
ALP SERPL-CCNC: 203 U/L (ref 55–135)
ALT SERPL W/O P-5'-P-CCNC: 17 U/L (ref 10–44)
ANION GAP SERPL CALC-SCNC: 14 MMOL/L (ref 8–16)
ANION GAP SERPL CALC-SCNC: 16 MMOL/L (ref 8–16)
ANION GAP SERPL CALC-SCNC: 16 MMOL/L (ref 8–16)
AST SERPL-CCNC: 27 U/L (ref 10–40)
BASOPHILS # BLD AUTO: 0.01 K/UL (ref 0–0.2)
BASOPHILS NFR BLD: 0.1 % (ref 0–1.9)
BILIRUB SERPL-MCNC: 1.7 MG/DL (ref 0.1–1)
BLD PROD TYP BPU: NORMAL
BLOOD UNIT EXPIRATION DATE: NORMAL
BLOOD UNIT TYPE CODE: 6200
BLOOD UNIT TYPE: NORMAL
BUN SERPL-MCNC: 122 MG/DL (ref 6–20)
BUN SERPL-MCNC: 141 MG/DL (ref 6–20)
BUN SERPL-MCNC: 143 MG/DL (ref 6–20)
CALCIUM SERPL-MCNC: 10 MG/DL (ref 8.7–10.5)
CALCIUM SERPL-MCNC: 10.3 MG/DL (ref 8.7–10.5)
CALCIUM SERPL-MCNC: 9.8 MG/DL (ref 8.7–10.5)
CHLORIDE SERPL-SCNC: 86 MMOL/L (ref 95–110)
CHLORIDE SERPL-SCNC: 86 MMOL/L (ref 95–110)
CHLORIDE SERPL-SCNC: 92 MMOL/L (ref 95–110)
CO2 SERPL-SCNC: 33 MMOL/L (ref 23–29)
CO2 SERPL-SCNC: 37 MMOL/L (ref 23–29)
CO2 SERPL-SCNC: 38 MMOL/L (ref 23–29)
CODING SYSTEM: NORMAL
CREAT SERPL-MCNC: 2.4 MG/DL (ref 0.5–1.4)
CREAT SERPL-MCNC: 2.7 MG/DL (ref 0.5–1.4)
CREAT SERPL-MCNC: 2.9 MG/DL (ref 0.5–1.4)
DIFFERENTIAL METHOD: ABNORMAL
DISPENSE STATUS: NORMAL
EOSINOPHIL # BLD AUTO: 0 K/UL (ref 0–0.5)
EOSINOPHIL NFR BLD: 0 % (ref 0–8)
ERYTHROCYTE [DISTWIDTH] IN BLOOD BY AUTOMATED COUNT: 22.2 % (ref 11.5–14.5)
EST. GFR  (AFRICAN AMERICAN): 27.3 ML/MIN/1.73 M^2
EST. GFR  (AFRICAN AMERICAN): 29.8 ML/MIN/1.73 M^2
EST. GFR  (AFRICAN AMERICAN): 34.3 ML/MIN/1.73 M^2
EST. GFR  (NON AFRICAN AMERICAN): 23.6 ML/MIN/1.73 M^2
EST. GFR  (NON AFRICAN AMERICAN): 25.7 ML/MIN/1.73 M^2
EST. GFR  (NON AFRICAN AMERICAN): 29.7 ML/MIN/1.73 M^2
GLUCOSE SERPL-MCNC: 156 MG/DL (ref 70–110)
GLUCOSE SERPL-MCNC: 158 MG/DL (ref 70–110)
GLUCOSE SERPL-MCNC: 162 MG/DL (ref 70–110)
HCT VFR BLD AUTO: 21.8 % (ref 40–54)
HGB BLD-MCNC: 6.3 G/DL (ref 14–18)
IMM GRANULOCYTES # BLD AUTO: 0.11 K/UL (ref 0–0.04)
IMM GRANULOCYTES NFR BLD AUTO: 1.2 % (ref 0–0.5)
INR PPP: 2.7 (ref 0.8–1.2)
LYMPHOCYTES # BLD AUTO: 0.5 K/UL (ref 1–4.8)
LYMPHOCYTES NFR BLD: 6 % (ref 18–48)
MAGNESIUM SERPL-MCNC: 2.5 MG/DL (ref 1.6–2.6)
MAGNESIUM SERPL-MCNC: 2.6 MG/DL (ref 1.6–2.6)
MAGNESIUM SERPL-MCNC: 2.7 MG/DL (ref 1.6–2.6)
MCH RBC QN AUTO: 24.8 PG (ref 27–31)
MCHC RBC AUTO-ENTMCNC: 28.9 G/DL (ref 32–36)
MCV RBC AUTO: 86 FL (ref 82–98)
MONOCYTES # BLD AUTO: 1.5 K/UL (ref 0.3–1)
MONOCYTES NFR BLD: 16.6 % (ref 4–15)
NEUTROPHILS # BLD AUTO: 6.9 K/UL (ref 1.8–7.7)
NEUTROPHILS NFR BLD: 76.1 % (ref 38–73)
NRBC BLD-RTO: 0 /100 WBC
NUM UNITS TRANS PACKED RBC: NORMAL
PHOSPHATE SERPL-MCNC: 3.7 MG/DL (ref 2.7–4.5)
PLATELET # BLD AUTO: 299 K/UL (ref 150–350)
PMV BLD AUTO: 10.5 FL (ref 9.2–12.9)
POTASSIUM SERPL-SCNC: 3.4 MMOL/L (ref 3.5–5.1)
POTASSIUM SERPL-SCNC: 3.5 MMOL/L (ref 3.5–5.1)
POTASSIUM SERPL-SCNC: 4 MMOL/L (ref 3.5–5.1)
PROT SERPL-MCNC: 8.5 G/DL (ref 6–8.4)
PROTHROMBIN TIME: 25.4 SEC (ref 9–12.5)
RBC # BLD AUTO: 2.54 M/UL (ref 4.6–6.2)
SODIUM SERPL-SCNC: 139 MMOL/L (ref 136–145)
SODIUM SERPL-SCNC: 139 MMOL/L (ref 136–145)
SODIUM SERPL-SCNC: 140 MMOL/L (ref 136–145)
WBC # BLD AUTO: 9.05 K/UL (ref 3.9–12.7)

## 2020-01-12 PROCEDURE — 90935 PR HEMODIALYSIS, ONE EVALUATION: ICD-10-PCS | Mod: ,,, | Performed by: INTERNAL MEDICINE

## 2020-01-12 PROCEDURE — 90935 HEMODIALYSIS ONE EVALUATION: CPT

## 2020-01-12 PROCEDURE — 36415 COLL VENOUS BLD VENIPUNCTURE: CPT

## 2020-01-12 PROCEDURE — 90935 HEMODIALYSIS ONE EVALUATION: CPT | Mod: ,,, | Performed by: INTERNAL MEDICINE

## 2020-01-12 PROCEDURE — 25000003 PHARM REV CODE 250: Performed by: HOSPITALIST

## 2020-01-12 PROCEDURE — 83735 ASSAY OF MAGNESIUM: CPT

## 2020-01-12 PROCEDURE — 80048 BASIC METABOLIC PNL TOTAL CA: CPT | Mod: 91

## 2020-01-12 PROCEDURE — 84100 ASSAY OF PHOSPHORUS: CPT

## 2020-01-12 PROCEDURE — 63600175 PHARM REV CODE 636 W HCPCS: Performed by: HOSPITALIST

## 2020-01-12 PROCEDURE — 99233 SBSQ HOSP IP/OBS HIGH 50: CPT | Mod: ,,, | Performed by: HOSPITALIST

## 2020-01-12 PROCEDURE — 83735 ASSAY OF MAGNESIUM: CPT | Mod: 91

## 2020-01-12 PROCEDURE — 99233 PR SUBSEQUENT HOSPITAL CARE,LEVL III: ICD-10-PCS | Mod: ,,, | Performed by: HOSPITALIST

## 2020-01-12 PROCEDURE — 80100014 HC HEMODIALYSIS 1:1

## 2020-01-12 PROCEDURE — 85610 PROTHROMBIN TIME: CPT

## 2020-01-12 PROCEDURE — 20600001 HC STEP DOWN PRIVATE ROOM

## 2020-01-12 PROCEDURE — 85025 COMPLETE CBC W/AUTO DIFF WBC: CPT

## 2020-01-12 PROCEDURE — 94761 N-INVAS EAR/PLS OXIMETRY MLT: CPT

## 2020-01-12 PROCEDURE — 80053 COMPREHEN METABOLIC PANEL: CPT

## 2020-01-12 RX ORDER — WARFARIN 3 MG/1
6 TABLET ORAL DAILY
Status: DISCONTINUED | OUTPATIENT
Start: 2020-01-12 | End: 2020-01-15

## 2020-01-12 RX ORDER — FUROSEMIDE 10 MG/ML
120 INJECTION INTRAMUSCULAR; INTRAVENOUS ONCE
Status: COMPLETED | OUTPATIENT
Start: 2020-01-12 | End: 2020-01-12

## 2020-01-12 RX ORDER — POTASSIUM CHLORIDE 20 MEQ/1
40 TABLET, EXTENDED RELEASE ORAL ONCE
Status: DISCONTINUED | OUTPATIENT
Start: 2020-01-12 | End: 2020-01-12

## 2020-01-12 RX ORDER — POTASSIUM CHLORIDE 20 MEQ/15ML
20 SOLUTION ORAL ONCE
Status: COMPLETED | OUTPATIENT
Start: 2020-01-12 | End: 2020-01-12

## 2020-01-12 RX ORDER — POTASSIUM CHLORIDE 20 MEQ/15ML
40 SOLUTION ORAL ONCE
Status: COMPLETED | OUTPATIENT
Start: 2020-01-12 | End: 2020-01-12

## 2020-01-12 RX ADMIN — POTASSIUM CHLORIDE 20 MEQ: 20 SOLUTION ORAL at 10:01

## 2020-01-12 RX ADMIN — WARFARIN SODIUM 6 MG: 3 TABLET ORAL at 07:01

## 2020-01-12 RX ADMIN — PREDNISONE 40 MG: 20 TABLET ORAL at 10:01

## 2020-01-12 RX ADMIN — FUROSEMIDE 120 MG: 10 INJECTION, SOLUTION INTRAMUSCULAR; INTRAVENOUS at 09:01

## 2020-01-12 RX ADMIN — HYDRALAZINE HYDROCHLORIDE AND ISOSORBIDE DINITRATE 1 TABLET: 37.5; 2 TABLET, FILM COATED ORAL at 10:01

## 2020-01-12 RX ADMIN — FUROSEMIDE 40 MG/HR: 10 INJECTION, SOLUTION INTRAMUSCULAR; INTRAVENOUS at 06:01

## 2020-01-12 RX ADMIN — OXYCODONE HYDROCHLORIDE 15 MG: 10 TABLET ORAL at 09:01

## 2020-01-12 RX ADMIN — HYDRALAZINE HYDROCHLORIDE AND ISOSORBIDE DINITRATE 1 TABLET: 37.5; 2 TABLET, FILM COATED ORAL at 09:01

## 2020-01-12 RX ADMIN — FUROSEMIDE 40 MG/HR: 10 INJECTION, SOLUTION INTRAMUSCULAR; INTRAVENOUS at 03:01

## 2020-01-12 RX ADMIN — FERROUS SULFATE TAB EC 325 MG (65 MG FE EQUIVALENT) 325 MG: 325 (65 FE) TABLET DELAYED RESPONSE at 10:01

## 2020-01-12 RX ADMIN — ASPIRIN 81 MG: 81 TABLET, COATED ORAL at 10:01

## 2020-01-12 RX ADMIN — OXYCODONE HYDROCHLORIDE 15 MG: 10 TABLET ORAL at 05:01

## 2020-01-12 RX ADMIN — POTASSIUM CHLORIDE 40 MEQ: 20 SOLUTION ORAL at 03:01

## 2020-01-12 RX ADMIN — DOBUTAMINE IN DEXTROSE 2.5 MCG/KG/MIN: 200 INJECTION, SOLUTION INTRAVENOUS at 02:01

## 2020-01-12 NOTE — ANESTHESIA PROCEDURE NOTES
Central Line    Diagnosis: ARF  Patient location during procedure: floor  Procedure start time: 1/11/2020 9:11 PM  Timeout: 1/11/2020 9:20 PM  Procedure end time: 1/11/2020 10:00 PM    Staffing  Authorizing Provider: Ben Cardoso MD  Performing Provider: Ervin Mcmillan MD    Staffing  Anesthesiologist: Ben Cardoso MD  Resident/CRNA: Ervin Mcmillan MD  Performed: resident/CRNA   Anesthesiologist was present at the time of the procedure.  Preanesthetic Checklist  Completed: patient identified, site marked, surgical consent, pre-op evaluation, timeout performed, IV checked, risks and benefits discussed, monitors and equipment checked and anesthesia consent given  Indication   Indication: hemodialysis     Anesthesia   local infiltration    Central Line   Skin Prep: skin prepped with ChloraPrep, skin prep agent completely dried prior to procedure  maximum sterile barriers used during central venous catheter insertion  hand hygiene performed prior to central venous catheter insertion  Location: right, internal jugular.   Catheter type: triple lumen  Catheter Size: 12 Fr  Inserted Catheter Length: 16 cm  Ultrasound: vascular probe with ultrasound  Vessel Caliber: large, patent, compressibility normal  Needle advanced into vessel with real time Ultrasound guidance.  Guidewire confirmed in vessel.  Image recorded and saved.   Manometry: Venous cannualation confirmed by visual estimation of blood vessel pressure using manometry.  Insertion Attempts: 1   Securement:line sutured and chlorhexidine patch    Post-Procedure   X-Ray: successful placement  Adverse Events:none    Guidewire Guidewire removed intact.

## 2020-01-12 NOTE — PLAN OF CARE
Pt free of falls/trauma/injuries. Pt had multiple run of Vtach through out shift (see note) other VSS. Pt continues in afib rhythm. C/O SOB with movement.  Pt being diuresed with continuous lasix at 40 mg / hr. Dobutamine continues to infuse at 2.5 mcg. Electrolytes replaced as ordered. R IJ placed at bedside by anesthesia. Xray ordered and obtained awaiting line verification. Pt noncompliant with fluid requirement; education provided and pt accepted. Wound care consulted for pt blisters to BLE. Pt remains with moderate edema to BLE. Pt tolerating plan of care. Will continue to monitor.

## 2020-01-12 NOTE — PROGRESS NOTES
[facility]  Nephrology  Progress Note    Patient Name: Hamlet Terrell  MRN: [id number]  Admission Date: 12/24/2019  Hospital Length of Stay: 19 days  Attending Provider: Antoinette Goins MD   Primary Care Physician: Carlin Swift MD  Principal Problem:Acute on chronic combined systolic and diastolic congestive heart failure    Subjective:     HPI: The pt is a 53 yo M with chronic combined HF (EF 25% on 11/27), atrial fibrillation on chronic AC (coumadin), CAD, CVA, and history of PE that presented to the ED with SOB & edema. Nephrology was consulted for assistance with management of cardiorenal syndrome in the setting of JEAN-PIERRE on CKD III now resistive to diuretic management. Baseline Cr appears to be 1.6-1.9. Cr elevated to 3.1 at time of consult. Of note, he was recently admitted to Hospital Medicine from 11/24-12/8 for a CHF exacerbation.  During that admission, he was evaluated by Cardiology who started him on Dobutamine and Diuril.  He was DC'ed home on Lasix 80 mg po BID.  He reports that he was compliant with this regimen, but despite this, he had continued to gain weight, have lower extremity and abdominal swelling, as well as shortness of breath. He was discharged with a weight of 259lbs, and claims that is around his dry weight. He is currently 283 lbs.       Per Dr. Goins:   Patient started on IV diuretics and  infusion for diagnosis of acute on chronic combined heart failure. His weight was stagnant and UOP tapered, so he was transitioned to IVP lasix for IV lasix gtt as this was actually effective during prior admission under guidance from co-management cardiology. However, the patient actually worsened symptomatically and TBili increased along with Cr and weight increase. The patient is now started back on lasix infusion with diuril augmentation. The patient's course is also c/b epistaxis, possibly related to his decompensated picture. He has required pRBC transfusion. He refuses to  apply pressure to the bridge of his nose as he says he cannot breathe through his mouth.  The patient's course is also c/b fever, likely due to pRBC transfusion.     Cardiology consulted for diuretic resistance. Despite two-week hospital stay thus far, he is only down 3 lbs. Nephrology consulted for acute renal failure and consideration of temporary HD for volume removal. Pt continues to exhibit poor insight into his disease. His wishes (to get healthy, ride his bike, walk to the store, return to the hospital if needed) is not on par with hospice. He reported similar feelings to Palliative Care yesterday. Discussed with Palliative Care provider. He is however now amenable for home dobutamine. PICC placement will likely be an issue given his current renal failure. Hb 6.9 secondary to renal failure; pt would like to hold off on transfusion today.    Interval History: Pt seen and examined at bedside. CVC placed overnight. BUN today 140s and patient displaying some confusion. UO of 4.2 L past 24 hrs.     Review of patient's allergies indicates:   Allergen Reactions    Acetaminophen      Itching    Oxycodone-acetaminophen      Other reaction(s): Itching    Ace inhibitors Other (See Comments)     cough     Current Facility-Administered Medications   Medication Frequency    0.9%  NaCl infusion (for blood administration) Q24H PRN    0.9%  NaCl infusion (for blood administration) Q24H PRN    0.9%  NaCl infusion PRN    0.9%  NaCl infusion Once    acetaminophen tablet 650 mg Q4H PRN    aspirin EC tablet 81 mg Daily    dextrose 10% (D10W) Bolus PRN    dextrose 10% (D10W) Bolus PRN    diphenhydrAMINE capsule 25 mg Q6H PRN    DOBUTamine 500mg in D5W 250mL infusion (premix) (NON-TITRATING) Continuous    ferrous sulfate EC tablet 325 mg Daily    furosemide (LASIX) 2 mg/mL in sodium chloride 0.9% 100 mL infusion (conc: 2 mg/mL) Continuous    glucagon (human recombinant) injection 1 mg PRN    glucose chewable tablet  16 g PRN    glucose chewable tablet 24 g PRN    isosorbide-hydrALAZINE 20-37.5 mg per tablet 1 tablet BID    lidocaine 5 % patch 1 patch Q24H    melatonin tablet 6 mg Nightly PRN    methocarbamol tablet 500 mg TID PRN    methyl salicylate-menthol 15-10% cream TID    metOLazone tablet 10 mg BID    morphine injection 4 mg Q6H PRN    ondansetron injection 8 mg Q8H PRN    oxyCODONE immediate release tablet 15 mg Q4H PRN    potassium chloride 10% oral solution 20 mEq Once    predniSONE tablet 40 mg Daily    promethazine (PHENERGAN) 25 mg in dextrose 5 % 50 mL IVPB Q6H PRN    senna-docusate 8.6-50 mg per tablet 1 tablet BID PRN    sodium chloride 0.65 % nasal spray 1 spray PRN    sodium chloride 0.9% flush 10 mL PRN    sodium chloride 0.9% flush 10 mL PRN    sodium chloride 0.9% flush 5 mL PRN       Objective:     Vital Signs (Most Recent):  Temp: 97.4 °F (36.3 °C) (01/12/20 0806)  Pulse: (!) 111 (01/12/20 0806)  Resp: 18 (01/12/20 0806)  BP: 127/65 (01/12/20 0806)  SpO2: (!) 89 % (01/12/20 0806)  O2 Device (Oxygen Therapy): room air (01/12/20 0806) Vital Signs (24h Range):  Temp:  [97.4 °F (36.3 °C)-98.8 °F (37.1 °C)] 97.4 °F (36.3 °C)  Pulse:  [] 111  Resp:  [16-20] 18  SpO2:  [89 %-99 %] 89 %  BP: (118-134)/(56-82) 127/65     Weight: 125.5 kg (276 lb 10.8 oz) (01/11/20 1124)  Body mass index is 40.86 kg/m².  Body surface area is 2.47 meters squared.    I/O last 3 completed shifts:  In: 2196.7 [P.O.:1870; I.V.:326.7]  Out: 4930 [Urine:4930]    Physical Exam   Constitutional: He is oriented to person, place, and time. No distress.   Sick appearing male   HENT:   Head: Normocephalic and atraumatic.   Right Ear: External ear normal.   Left Ear: External ear normal.   Nose: Nose normal.   Eyes: Pupils are equal, round, and reactive to light. EOM are normal. No scleral icterus.   Neck: JVD present. No tracheal deviation present.   Cardiovascular: Normal rate, regular rhythm and intact distal pulses.  Exam reveals gallop.   Pulses:       Radial pulses are 2+ on the right side, and 2+ on the left side.   Pulmonary/Chest: Effort normal. No stridor. He has no wheezes. He has rales (bibasilar).   Abdominal: Soft. There is no tenderness. There is no guarding.   Musculoskeletal: He exhibits edema and tenderness.   Neurological: He is alert and oriented to person, place, and time.   asterixis   Skin: Skin is warm and dry. Rash (chronic venous stasis ulceration to the lower extremities bilaterally) noted. He is not diaphoretic.   Psychiatric: He has a normal mood and affect. His behavior is normal.   Nursing note and vitals reviewed.      Significant Labs:  CBC:   Recent Labs   Lab 01/12/20  0348   WBC 9.05   RBC 2.54*   HGB 6.3*   HCT 21.8*      MCV 86   MCH 24.8*   MCHC 28.9*     CMP:   Recent Labs   Lab 01/12/20  0348   *   CALCIUM 10.3   ALBUMIN 3.3*   PROT 8.5*      K 3.4*   CO2 37*   CL 86*   *   CREATININE 2.7*   ALKPHOS 203*   ALT 17   AST 27   BILITOT 1.7*     All labs within the past 24 hours have been reviewed.         Assessment/Plan:     Stage 3 chronic kidney disease  Mr. Terrell is a 53 y/o male with HFrEF (last 2D ECHO EF 23%, moderate MR, moderate TR, diastolic dysfunction), hypertension, chronic atrial fibrillation, a flutter status post ablation, CVA in 2009, coronary artery disease, CKD III, and obstructive sleep apnea presents with ADCHF. Nephro consulted for JEAN-PIERRE on CKD in the setting of Cardiorenal syndrome.   - JEAN-PIERRE was thought to be to be multifactorial from labile BP, and component of cardiorenal syndrome  - Creatinine up trending since admission, baseline 1.6-1.9, currently 3.0   - Azotemia worsening daily. No uremic symptoms present.     Plan/Rec  - Temporary dialysis catheter placed last night  - Initial HD session for volume removal and metabolic clearance planned for today  - recommend holding diuretics at this time  - Strict I/O and chart  - Avoid nephrotoxic  medications, NSAIDs, IV contrast, etc.  - Daily weights and chart  - Medication doses adjusted to GFR  - Maintain MAP > 65  - Hb > 7 gm/dL  - Will follow closely        Thank you for your consult. I will follow-up with patient. Please contact us if you have any additional questions.    Catarina Blackwell MD  Nephrology  [facility]

## 2020-01-12 NOTE — ASSESSMENT & PLAN NOTE
Mr. Terrell is a 53 y/o male with HFrEF (last 2D ECHO EF 23%, moderate MR, moderate TR, diastolic dysfunction), hypertension, chronic atrial fibrillation, a flutter status post ablation, CVA in 2009, coronary artery disease, CKD III, and obstructive sleep apnea presents with ADCHF. Nephro consulted for JEAN-PIERRE on CKD in the setting of Cardiorenal syndrome.   - JEAN-PIERRE was thought to be to be multifactorial from labile BP, and component of cardiorenal syndrome  - Creatinine up trending since admission, baseline 1.6-1.9, currently 3.0   - Azotemia worsening daily. No uremic symptoms present.     Plan/Rec  - Temporary dialysis catheter placed last night  - Initial HD session for volume removal and metabolic clearance planned for today  - recommend holding diuretics at this time  - Strict I/O and chart  - Avoid nephrotoxic medications, NSAIDs, IV contrast, etc.  - Daily weights and chart  - Medication doses adjusted to GFR  - Maintain MAP > 65  - Hb > 7 gm/dL  - Will follow closely

## 2020-01-12 NOTE — PROGRESS NOTES
Arrived to patient's room. AAOx3. Reoriented to time. Agreed to 2 hour HD Tx. UF goal 750 ml. Rt. IJ cath lumens sluggish to aspirate and flush.

## 2020-01-12 NOTE — PROGRESS NOTES
Pt having multiple runs of V tach through out shift. Ranging from 2 beat run to a 21 beat run at 12:24. Notified [facility]. Orders placed for a BMP. Pt asymptomatic sitting in chair. Will continue to monitor.       Pt continual runs of Vtach notified MD [facility] for [facility]. K 3.5 potassium 40 liquid replacement given. Mg add on completed result of Mg 2.6. Will continue to monitor.

## 2020-01-12 NOTE — PROGRESS NOTES
"Consulted by primary team 1/10 for central line placement. On 1/11, patient was refusing central line given that he insisted he could not lie flat. Reported by anesthesia colleague to this author that possibility of femoral line was discussed with patient, but that the patient was still not amenable to placement. Anesthesia re consulted by primary 1/11 as reports that patient had changed his mind about central line.    Upon discussion with patient the evening of 1/11, Mr Terrell indicated that he understood that a central line was neccessary for dialysis but that he would not be agreeing to a femoral line. He indicated he would be agreeable to a neck line. However, on attempts to lie patient flat, he reported strong back pain at around 50 degrees elevation. At this point, staff anesthesia came to the room and discussed with patient the possibility that he could have back pain during the procedure and that this suffering would be temporary in order to receive dialysis catheter. Explained danger of moderate to heavy sedation in floor setting. Author instructed nursing to give patient prn pain medicine and that we would check back in 30 minutes.    On reassessment, patient was still having back pain with positioning but was able to tolerate around 30 degrees after many attempts. Explained to patient that procedure is most safely performed in trendelenburg but patient rufused to go any lower. Given urgent need for dialysis, procedure attempted. All conversations witnessed by charge nurse. Consent witnessed by charge nurse. Procedure witnessed by charge nurse. Patient's brother also in room and privy to inital conversation. Throughout procedure, patient was complaining that he was "being smothered". Ensured patient had O2 face mask with O2 flowing at 6 L/min. Contentiously remarking for author "to get hands out of my face." Reassured patient that authors hands were where they needed to be to perform procedure safely. "     Please feel free to page with any questions or concerns.    Ervin Mcmillan MD  PGY-2 Anesthesiology   [phone] (pager)  1/11/2020 10:43 PM

## 2020-01-12 NOTE — NURSING
Pt refusing morning Vital Signs. Paged [facility] will continue to monitor. Pt had another 6 beat run v tach.

## 2020-01-12 NOTE — PROGRESS NOTES
Progress Note  Hospital Medicine    Provider team:    [facility] [place] MED C  [facility] NEPHROLOGY CONSULT SERVICE  Admit Date: 12/24/2019  Encounter Date: 01/12/2020     SUBJECTIVE:     Follow-up Visit for: Acute on chronic combined systolic and diastolic congestive heart failure    HPI (See H&P for complete P,F,SHx):  54M with chronic combined systolic and diastolic heart failure (EF 25%), afib on Coumadin, CAD, CVA, and history of PE who presents to the ED for evaluation of shortness of breath and edema.  He was just admitted to Hospital Medicine from 11/24-12/8 for a CHF exacerbation.  During that admission, he was evaluated by Cardiology who started him on Dobutamine and Diuril.  He was resistant to HTS evaluation during that stay, which included recommendations for LVAD, transplant, and ICD.  He was discharged home on Lasix 80mg PO BID.  He endorses compliance with his diuretic regimen, but despite this and following a low salt diet, he has continued to gain weight, have lower extremity and abdominal swelling, as well as shortness of breath.  He is so short of breath that he has to sleep in a chair, and has been unable to sleep because of his breathing.  He denies any chest pain.  He does have a cough that is mostly dry, but occasionally productive of blood tinged sputum.  He was discharged weight a weight of 259lbs, and claims that is around his dry weight.  He is currently 286lbs.       Upon arrival to the ED, vitals were /83, HR 88, RR 22.  Labs showed Hemoglobin 7.3, INR 1.5, Creatinine 1.8, Alk Phos 278,  with Trop 0.128. CXR showed cardiomegaly and pulmonary edema, along with a masslike opacity. CT chest was attempted to be ordered to evaluate the mass, but he was unable to lie flat to get it evaluated.  He was given Lasix 80mg IV and was admitted to Hospital Medicine for further management.    TTE 11/27/2019  · Severely decreased LVSF.  · The estimated ejection fraction is 25%  · Concentric left  ventricular hypertrophy.  · Global hypokinetic wall motion.  · Moderate right ventricular enlargement.  · Moderately reduced RVSF.  · Severe biatrial enlargement.  · Moderate mitral regurgitation.  · Moderate tricuspid regurgitation.  · The estimated PA systolic pressure is 40 mm Hg  · Elevated central venous pressure (15 mm Hg).  · Atrial fibrillation observed.    Hospital course:  Patient started on IV diuretics and  infusion for diagnosis of acute on chronic combined heart failure. His weight was stagnant and UOP tapered, so he was transitioned to IVP lasix for IV lasix gtt as this was actually effective during prior admission under guidance from co-management cardiology. However, the patient actually worsened symptomatically and TBili increased along with Cr and weight increase. The patient is now started back on lasix infusion with diuril augmentation. The patient's course is also c/b epistaxis, possibly related to his decompensated picture. He has required pRBC transfusion.     Interval history:  Dialysis central line placed last night. Patient reports not feeling well. Is fatigued w/ mild lethargy. Nephro to initiate HD today.     Wt Readings from Last 30 Encounters:   01/11/20 125.5 kg (276 lb 10.8 oz)   12/07/19 117.6 kg (259 lb 4.2 oz)   10/23/19 108.9 kg (240 lb)   09/09/19 108.9 kg (240 lb)   08/04/19 105.2 kg (232 lb)   07/18/19 108.2 kg (238 lb 8.6 oz)   06/20/19 118.9 kg (262 lb 2 oz)   05/21/19 121.1 kg (267 lb)   05/16/19 116.3 kg (256 lb 6.3 oz)   04/20/19 127.5 kg (281 lb 1.4 oz)   01/31/19 113.4 kg (250 lb)   11/04/18 123.1 kg (271 lb 6.2 oz)   08/31/18 119.6 kg (263 lb 10.7 oz)   07/28/18 113.4 kg (250 lb)   07/10/18 120.7 kg (266 lb)   05/03/18 117.9 kg (259 lb 14.8 oz)   03/30/18 117.9 kg (260 lb)   02/21/18 117.9 kg (260 lb)   01/12/18 113.4 kg (250 lb)   12/10/17 117.9 kg (260 lb)   11/11/17 113.9 kg (251 lb)   11/01/17 111.8 kg (246 lb 7.6 oz)   10/26/17 118.5 kg (261 lb 3.2 oz)    08/13/17 117.9 kg (260 lb)   08/12/17 117.9 kg (260 lb)   05/10/17 117.9 kg (260 lb)   04/05/17 108.4 kg (239 lb 1.4 oz)   02/01/17 108.9 kg (240 lb)   12/15/16 119.4 kg (263 lb 3.7 oz)   08/26/16 112.5 kg (248 lb)     Review of Systems:  Review of Systems   Constitutional: Negative for chills and fever.   HENT: Negative for congestion and sore throat.    Eyes: Negative for photophobia, pain and discharge.   Respiratory: Positive for shortness of breath. Negative for cough, hemoptysis and sputum production.    Cardiovascular: Positive for orthopnea, leg swelling and PND. Negative for chest pain and palpitations.   Gastrointestinal: Negative for abdominal pain, diarrhea, nausea and vomiting.   Genitourinary: Negative for dysuria and urgency.   Musculoskeletal: Negative for myalgias and neck pain.   Skin: Negative for itching and rash.   Neurological: Negative for sensory change, focal weakness and headaches.   Endo/Heme/Allergies: Negative for polydipsia. Does not bruise/bleed easily.   Psychiatric/Behavioral: Negative for depression and suicidal ideas.     Past Medical History:   Diagnosis Date    Anticoagulant long-term use     Cardiomegaly     Chronic combined systolic and diastolic congestive heart failure     Coronary artery disease     Heart attack 2006    Hypertension     Hyperthyroidism, subclinical 1/2/2013    MI (myocardial infarction) 9/22/2013    MI in 2009      Paroxysmal atrial fibrillation     PE (pulmonary embolism) 1/1/2013    IN 2010     S/P ablation of atrial flutter 2008    Stroke 2009    no residual weaknesses     Social History     Socioeconomic History    Marital status: Single     Spouse name: Not on file    Number of children: Not on file    Years of education: Not on file    Highest education level: Not on file   Occupational History    Not on file   Social Needs    Financial resource strain: Not on file    Food insecurity:     Worry: Not on file     Inability: Not on  "file    Transportation needs:     Medical: Not on file     Non-medical: Not on file   Tobacco Use    Smoking status: Never Smoker    Smokeless tobacco: Never Used   Substance and Sexual Activity    Alcohol use: No    Drug use: No    Sexual activity: Never   Lifestyle    Physical activity:     Days per week: Not on file     Minutes per session: Not on file    Stress: Not on file   Relationships    Social connections:     Talks on phone: Not on file     Gets together: Not on file     Attends Rastafari service: Not on file     Active member of club or organization: Not on file     Attends meetings of clubs or organizations: Not on file     Relationship status: Not on file   Other Topics Concern    Not on file   Social History Narrative    Not on file       OBJECTIVE:       Intake/Output Summary (Last 24 hours) at 1/12/2020 1111  Last data filed at 1/12/2020 0920  Gross per 24 hour   Intake 1546.7 ml   Output 3525 ml   Net -1978.3 ml     Vital Signs Range (Last 24H):  Temp:  [97.4 °F (36.3 °C)-98.8 °F (37.1 °C)]   Pulse:  [103-120]   Resp:  [16-18]   BP: (120-134)/(58-82)   SpO2:  [89 %-99 %]   Body mass index is 40.86 kg/m².    Objective:  General Appearance:  Comfortable, well-appearing, in no acute distress and not in pain.    Vital signs: (most recent): Blood pressure 127/65, pulse (!) 111, temperature 97.4 °F (36.3 °C), temperature source Oral, resp. rate 18, height 5' 9" (1.753 m), weight 125.5 kg (276 lb 10.8 oz), SpO2 (!) 93 %.  No fever.    Output: Producing urine and producing stool.    HEENT: Normal HEENT exam.  (+JVD)    Lungs:  Normal effort.  Breath sounds clear to auscultation.  He is not in respiratory distress.  There are rales.  No wheezes.    Heart: Normal rate.  Regular rhythm.  S1 normal and S2 normal.  Positive for murmur.    Chest: Symmetric chest wall expansion.   Abdomen: Abdomen is soft.  Bowel sounds are normal.   There is no abdominal tenderness.     Extremities: Normal range of " motion.  There is venous stasis and dependent edema.  There is no deformity, effusion or local swelling.    Pulses: Distal pulses are intact.    Neurological: Patient is alert and oriented to person, place and time.  Normal strength.    Pupils:  Pupils are equal, round, and reactive to light.    Skin:  Warm and dry.      Medications:  Medication list was reviewed in EPIC and changes noted under Assessment/Plan and MAR.    Laboratory:  Recent Labs     01/12/20  0348   WBC 9.05   RBC 2.54*   HGB 6.3*   HCT 21.8*      MCV 86   MCH 24.8*   MCHC 28.9*   GRAN 76.1*  6.9   LYMPH 6.0*  0.5*   MONO 16.6*  1.5*   EOS 0.0      Recent Labs     01/12/20  0348   *      K 3.4*   CL 86*   CO2 37*   *   CREATININE 2.7*   CALCIUM 10.3   ANIONGAP 16   MG 2.7*   PHOS 3.7     Prior records reviewed.    ECHO reviewed:  · Severely decreased left ventricular systolic function. The estimated ejection fraction is 25%  · Concentric left ventricular hypertrophy.  · Global hypokinetic wall motion.  · Moderate right ventricular enlargement.  · Moderately reduced right ventricular systolic function.  · Severe biatrial enlargement.  · Moderate mitral regurgitation.  · Moderate tricuspid regurgitation.  · The estimated PA systolic pressure is 40 mm Hg  · Elevated central venous pressure (15 mm Hg).  · Atrial fibrillation observed.      Imaging reviewed  Imaging Results          CT Chest Without Contrast (No Result on File)                X-Ray Chest AP Portable (Final result)  Result time 12/24/19 19:09:07    Final result by Zeb Hirsch MD (12/24/19 19:09:07)                 Impression:      Masslike opacity overlying the right lower lung zone, similar to prior exam.  Right pleural fluid not excluded.  Chest CT recommended for further evaluation of right lung masslike opacity.    Patchy bilateral pulmonary opacities, similar to prior exam.    Cardiomegaly.    Electronically signed by resident: Cristofer  April  Date:    12/24/2019  Time:    18:48    Electronically signed by: Zeb Hirsch MD  Date:    12/24/2019  Time:    19:09             Narrative:    EXAMINATION:  XR CHEST AP PORTABLE    CLINICAL HISTORY:  CHF;    TECHNIQUE:  Single frontal view of the chest was performed.    COMPARISON:  Chest radiograph 12/03/2019    FINDINGS:  Cardiac leads overlie the chest wall.    Redemonstration of extensive bilateral patchy opacities, more prominent on the right with a focal area of masslike opacity in the right lower lung zone.  No significant change from prior exam.  Left costophrenic angle is visible and right sided pleural fluid not excluded noting presence of opacification.  No pneumothorax.    The cardiac silhouette is enlarged.  Hilar and mediastinal contours are unchanged.    Bones are intact.                                  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 [I13.0]  Yes    Elevated alkaline phosphatase level [R74.8]  Yes    Coronary artery disease [I25.10]  Yes     Chronic    Stage 3 chronic kidney disease [N18.3]  Yes    Subtherapeutic international normalized ratio (INR) [R79.1]  Yes    Personal history of cerebral embolism [Z86.79]  Not Applicable    Personal history of venous thrombosis and embolism [Z86.718]  Not Applicable    Personal history of MI (myocardial infarction) [I25.2]  Not Applicable    Essential hypertension [I10]  Yes     Chronic    Atrial fibrillation, chronic [I48.20]  Yes     Chronic    Chronic anticoagulation [Z79.01]  Not Applicable     Chronic    Cardiomyopathy [I42.9]  Yes     Chronic      Resolved Hospital  Problems   No resolved problems to display.      Acute on Chronic Combined Systolic and Diastolic Heart Failure  Cardiomyopathy  · CHF Pathway initiated  · Last 2D echo Nov 2019 with EF 25%  ·  and CXR with pulmonary edema and cardiomegaly  · Started on Lasix gtt, increased to 40 mg/hr. Failed transition to high dose IV Lasix pushes. Subsequently started back on Lasix gtt  · Changed Diuril to BID Metolazone 10 mg per Nephro recs  · Cont  gtt 2.5, which will be continued at home. Will need long-term IV placement when he nears discharge. PICC placement may be an issue given his renal failure   · Cardiology Co-mgmt consulted. Recommend Nephro consult for temporary HD for volume removal due to diuretic resistance. Nephro to initiate dialysis today. Hold diuretics  · Goal diuresis 2-3L/day.  Home diuretic dose:  Lasix 80mg PO BID  · BID BMP (with morning labs and at 4pm)  · Strict I&Os with daily weights.  · Hold BB, okay to c/w BiDil     JEAN-PIERRE on CKD3  · Baseline 1.8  · Cardiorenal   · Steadily rising BUN w/ possible uremic effect today  · Nephrology to start HD today    Chronic AFib  Long Term Use of Anticoagulants  Supratherapeutic INR  · Chronic issue  · HR in 100-120's  · INR initially subtherapeutic. Dose increased to 7.5mg. Now supratherapeutic. Hold coumadin until improved. INR now 2.7 today. Pharmacy consulted for dosing management     Essential HTN  · Chronic and stable  · Continue Bidil BID     CAD  History of MI  · Chronic and stable  · Continue Aspirin 81mg PO daily  · Continue Bidil BID     Anemia  · Received 5 units PRBCs on prior hospital admission  · Continue PO iron  · Type and screen obtained  · Monitor for bleeding/need for transfusion  · Transfused 1U pRBC after episode of epistaxis noted. Refusing further transfusions at this time  · Suspect dilutional at this time     History of VTE  History of Stroke  · Chronic and stable  · Continue Aspirin 81mg PO daily  · Continue Warfarin when INR  appropriate      Elevated Alk Phos  · Chronic and stable  · Monitor     Benign Hypertensive Heart and Kidney Disease with HF and CKD  · As above     Abnormal CT  · Needs CT chest when able to lie flat or outpt to evaluate masslike opacity seen on CXR     Diet:  Low Sodium, 1 L fluid restriction  VTE PPx:  Warfarin  Goals of Care:  Full     High Risk Conditions:   Patient is currently on drug therapy requiring intensive monitoring for toxicity: Lasix gtt    Anticipated discharge date and disposition:   Pending volume removal/diuresis. Home hospice on discharge. Pt w/ very poor insight into disease process and will likely continue to be readmitted to the hospital    Antoinette Goins MD  [facility]

## 2020-01-13 PROBLEM — S81.801A OPEN WOUND OF RIGHT LOWER LEG: Status: ACTIVE | Noted: 2020-01-13

## 2020-01-13 LAB
ALBUMIN SERPL BCP-MCNC: 3.3 G/DL (ref 3.5–5.2)
ALP SERPL-CCNC: 197 U/L (ref 55–135)
ALT SERPL W/O P-5'-P-CCNC: 18 U/L (ref 10–44)
ANION GAP SERPL CALC-SCNC: 16 MMOL/L (ref 8–16)
AST SERPL-CCNC: 26 U/L (ref 10–40)
BASOPHILS # BLD AUTO: 0.01 K/UL (ref 0–0.2)
BASOPHILS NFR BLD: 0.1 % (ref 0–1.9)
BILIRUB SERPL-MCNC: 1.6 MG/DL (ref 0.1–1)
BUN SERPL-MCNC: 118 MG/DL (ref 6–20)
CALCIUM SERPL-MCNC: 9.9 MG/DL (ref 8.7–10.5)
CHLORIDE SERPL-SCNC: 94 MMOL/L (ref 95–110)
CO2 SERPL-SCNC: 32 MMOL/L (ref 23–29)
CREAT SERPL-MCNC: 2.3 MG/DL (ref 0.5–1.4)
DIFFERENTIAL METHOD: ABNORMAL
EOSINOPHIL # BLD AUTO: 0 K/UL (ref 0–0.5)
EOSINOPHIL NFR BLD: 0.1 % (ref 0–8)
ERYTHROCYTE [DISTWIDTH] IN BLOOD BY AUTOMATED COUNT: 22 % (ref 11.5–14.5)
EST. GFR  (AFRICAN AMERICAN): 36.1 ML/MIN/1.73 M^2
EST. GFR  (NON AFRICAN AMERICAN): 31.3 ML/MIN/1.73 M^2
GLUCOSE SERPL-MCNC: 117 MG/DL (ref 70–110)
HCT VFR BLD AUTO: 21.3 % (ref 40–54)
HGB BLD-MCNC: 6.3 G/DL (ref 14–18)
IMM GRANULOCYTES # BLD AUTO: 0.08 K/UL (ref 0–0.04)
IMM GRANULOCYTES NFR BLD AUTO: 0.9 % (ref 0–0.5)
INR PPP: 2.1 (ref 0.8–1.2)
LYMPHOCYTES # BLD AUTO: 0.7 K/UL (ref 1–4.8)
LYMPHOCYTES NFR BLD: 7.6 % (ref 18–48)
MAGNESIUM SERPL-MCNC: 2.5 MG/DL (ref 1.6–2.6)
MCH RBC QN AUTO: 25.4 PG (ref 27–31)
MCHC RBC AUTO-ENTMCNC: 29.6 G/DL (ref 32–36)
MCV RBC AUTO: 86 FL (ref 82–98)
MONOCYTES # BLD AUTO: 1.5 K/UL (ref 0.3–1)
MONOCYTES NFR BLD: 17.2 % (ref 4–15)
NEUTROPHILS # BLD AUTO: 6.5 K/UL (ref 1.8–7.7)
NEUTROPHILS NFR BLD: 74.1 % (ref 38–73)
NRBC BLD-RTO: 0 /100 WBC
PHOSPHATE SERPL-MCNC: 3.5 MG/DL (ref 2.7–4.5)
PLATELET # BLD AUTO: 271 K/UL (ref 150–350)
PMV BLD AUTO: 9.4 FL (ref 9.2–12.9)
POTASSIUM SERPL-SCNC: 3.7 MMOL/L (ref 3.5–5.1)
PROT SERPL-MCNC: 8.7 G/DL (ref 6–8.4)
PROTHROMBIN TIME: 20.5 SEC (ref 9–12.5)
RBC # BLD AUTO: 2.48 M/UL (ref 4.6–6.2)
SODIUM SERPL-SCNC: 142 MMOL/L (ref 136–145)
WBC # BLD AUTO: 8.82 K/UL (ref 3.9–12.7)

## 2020-01-13 PROCEDURE — A6209 FOAM DRSG <=16 SQ IN W/O BDR: HCPCS

## 2020-01-13 PROCEDURE — 36415 COLL VENOUS BLD VENIPUNCTURE: CPT

## 2020-01-13 PROCEDURE — 85025 COMPLETE CBC W/AUTO DIFF WBC: CPT

## 2020-01-13 PROCEDURE — 63600175 PHARM REV CODE 636 W HCPCS: Performed by: HOSPITALIST

## 2020-01-13 PROCEDURE — 84100 ASSAY OF PHOSPHORUS: CPT

## 2020-01-13 PROCEDURE — 99233 SBSQ HOSP IP/OBS HIGH 50: CPT | Mod: ,,, | Performed by: NURSE PRACTITIONER

## 2020-01-13 PROCEDURE — 80053 COMPREHEN METABOLIC PANEL: CPT

## 2020-01-13 PROCEDURE — 80100014 HC HEMODIALYSIS 1:1

## 2020-01-13 PROCEDURE — 99233 SBSQ HOSP IP/OBS HIGH 50: CPT | Mod: ,,, | Performed by: INTERNAL MEDICINE

## 2020-01-13 PROCEDURE — 99233 PR SUBSEQUENT HOSPITAL CARE,LEVL III: ICD-10-PCS | Mod: ,,, | Performed by: NURSE PRACTITIONER

## 2020-01-13 PROCEDURE — 99233 PR SUBSEQUENT HOSPITAL CARE,LEVL III: ICD-10-PCS | Mod: ,,, | Performed by: HOSPITALIST

## 2020-01-13 PROCEDURE — 85610 PROTHROMBIN TIME: CPT

## 2020-01-13 PROCEDURE — 25000003 PHARM REV CODE 250: Performed by: HOSPITALIST

## 2020-01-13 PROCEDURE — 20600001 HC STEP DOWN PRIVATE ROOM

## 2020-01-13 PROCEDURE — 99233 PR SUBSEQUENT HOSPITAL CARE,LEVL III: ICD-10-PCS | Mod: ,,, | Performed by: INTERNAL MEDICINE

## 2020-01-13 PROCEDURE — 83735 ASSAY OF MAGNESIUM: CPT

## 2020-01-13 PROCEDURE — 99233 SBSQ HOSP IP/OBS HIGH 50: CPT | Mod: ,,, | Performed by: HOSPITALIST

## 2020-01-13 PROCEDURE — 90935 HEMODIALYSIS ONE EVALUATION: CPT

## 2020-01-13 RX ORDER — METOLAZONE 2.5 MG/1
10 TABLET ORAL ONCE
Status: COMPLETED | OUTPATIENT
Start: 2020-01-13 | End: 2020-01-13

## 2020-01-13 RX ORDER — METOLAZONE 2.5 MG/1
10 TABLET ORAL 2 TIMES DAILY
Status: DISCONTINUED | OUTPATIENT
Start: 2020-01-13 | End: 2020-01-13

## 2020-01-13 RX ORDER — POTASSIUM CHLORIDE 20 MEQ/15ML
20 SOLUTION ORAL
Status: COMPLETED | OUTPATIENT
Start: 2020-01-13 | End: 2020-01-13

## 2020-01-13 RX ORDER — SODIUM CHLORIDE 9 MG/ML
INJECTION, SOLUTION INTRAVENOUS ONCE
Status: CANCELLED | OUTPATIENT
Start: 2020-01-13 | End: 2020-01-13

## 2020-01-13 RX ORDER — FUROSEMIDE 10 MG/ML
120 INJECTION INTRAMUSCULAR; INTRAVENOUS ONCE
Status: DISCONTINUED | OUTPATIENT
Start: 2020-01-13 | End: 2020-01-13

## 2020-01-13 RX ORDER — SODIUM CHLORIDE 9 MG/ML
INJECTION, SOLUTION INTRAVENOUS
Status: CANCELLED | OUTPATIENT
Start: 2020-01-13

## 2020-01-13 RX ORDER — FUROSEMIDE 10 MG/ML
120 INJECTION INTRAMUSCULAR; INTRAVENOUS ONCE
Status: COMPLETED | OUTPATIENT
Start: 2020-01-13 | End: 2020-01-13

## 2020-01-13 RX ADMIN — FERROUS SULFATE TAB EC 325 MG (65 MG FE EQUIVALENT) 325 MG: 325 (65 FE) TABLET DELAYED RESPONSE at 09:01

## 2020-01-13 RX ADMIN — FUROSEMIDE 120 MG: 10 INJECTION, SOLUTION INTRAMUSCULAR; INTRAVENOUS at 05:01

## 2020-01-13 RX ADMIN — METOLAZONE 10 MG: 2.5 TABLET ORAL at 05:01

## 2020-01-13 RX ADMIN — POTASSIUM CHLORIDE 20 MEQ: 20 SOLUTION ORAL at 02:01

## 2020-01-13 RX ADMIN — POTASSIUM CHLORIDE 20 MEQ: 20 SOLUTION ORAL at 09:01

## 2020-01-13 RX ADMIN — ONDANSETRON 8 MG: 2 INJECTION INTRAMUSCULAR; INTRAVENOUS at 10:01

## 2020-01-13 RX ADMIN — FUROSEMIDE 40 MG/HR: 10 INJECTION, SOLUTION INTRAMUSCULAR; INTRAVENOUS at 05:01

## 2020-01-13 RX ADMIN — FUROSEMIDE 40 MG/HR: 10 INJECTION, SOLUTION INTRAMUSCULAR; INTRAVENOUS at 09:01

## 2020-01-13 RX ADMIN — HYDRALAZINE HYDROCHLORIDE AND ISOSORBIDE DINITRATE 1 TABLET: 37.5; 2 TABLET, FILM COATED ORAL at 09:01

## 2020-01-13 RX ADMIN — OXYCODONE HYDROCHLORIDE 15 MG: 10 TABLET ORAL at 08:01

## 2020-01-13 RX ADMIN — ASPIRIN 81 MG: 81 TABLET, COATED ORAL at 09:01

## 2020-01-13 RX ADMIN — HYDRALAZINE HYDROCHLORIDE AND ISOSORBIDE DINITRATE 1 TABLET: 37.5; 2 TABLET, FILM COATED ORAL at 08:01

## 2020-01-13 RX ADMIN — WARFARIN SODIUM 6 MG: 3 TABLET ORAL at 05:01

## 2020-01-13 RX ADMIN — DOBUTAMINE IN DEXTROSE 2.5 MCG/KG/MIN: 200 INJECTION, SOLUTION INTRAVENOUS at 09:01

## 2020-01-13 RX ADMIN — OXYCODONE HYDROCHLORIDE 15 MG: 10 TABLET ORAL at 09:01

## 2020-01-13 NOTE — PROGRESS NOTES
Tx complete. Tolerated. Removed 750ml. Rt IJ cath lumens flushed with NS and capped. Report given to primary nurse.

## 2020-01-13 NOTE — NURSING
Pt had 28 run of v tach. Notified [facility] orders placed for STAT BMP. Will continue to monitor.       22:58 BMP electrolyte results WNL     0527 Notified MD Medrano of pt urine output 700ml for the night. Pt also had 33 beat run of VTach. No new orders given at this time. Awaiting AM labs. Will continue to monitor.

## 2020-01-13 NOTE — PLAN OF CARE
Pt remains free from falls and injuries. VSS. K replaced with a total of 40 meq of K chloride. Pt up in chair most of the shift. Pt had one run of 15 beat run of vtach otherwise afib on the monitor in the low 100s.  gtt infusing per MD order. PIV placed by PICC team.    Confusion

## 2020-01-13 NOTE — CONSULTS
Patient having discussion with a member of his healthcare team asked to return at a later time please.

## 2020-01-13 NOTE — SUBJECTIVE & OBJECTIVE
Interval History: Patient seen and examined at bedside, BUN still elevated, underwent 2 hours of HD yesterday.     Review of patient's allergies indicates:   Allergen Reactions    Acetaminophen      Itching    Oxycodone-acetaminophen      Other reaction(s): Itching    Ace inhibitors Other (See Comments)     cough     Current Facility-Administered Medications   Medication Frequency    acetaminophen tablet 650 mg Q4H PRN    aspirin EC tablet 81 mg Daily    dextrose 10% (D10W) Bolus PRN    dextrose 10% (D10W) Bolus PRN    diphenhydrAMINE capsule 25 mg Q6H PRN    DOBUTamine 500mg in D5W 250mL infusion (premix) (NON-TITRATING) Continuous    ferrous sulfate EC tablet 325 mg Daily    glucagon (human recombinant) injection 1 mg PRN    glucose chewable tablet 16 g PRN    glucose chewable tablet 24 g PRN    isosorbide-hydrALAZINE 20-37.5 mg per tablet 1 tablet BID    melatonin tablet 6 mg Nightly PRN    methocarbamol tablet 500 mg TID PRN    methyl salicylate-menthol 15-10% cream TID PRN    morphine injection 4 mg Q6H PRN    ondansetron injection 8 mg Q8H PRN    oxyCODONE immediate release tablet 15 mg Q4H PRN    potassium chloride 10% oral solution 20 mEq Q2H    promethazine (PHENERGAN) 25 mg in dextrose 5 % 50 mL IVPB Q6H PRN    senna-docusate 8.6-50 mg per tablet 1 tablet BID PRN    sodium chloride 0.65 % nasal spray 1 spray PRN    warfarin (COUMADIN) tablet 6 mg Daily       Objective:     Vital Signs (Most Recent):  Temp: (P) 98 °F (36.7 °C) (01/13/20 0900)  Pulse: (!) 117 (01/13/20 1116)  Resp: (P) 16 (01/13/20 0900)  BP: (P) 126/71 (01/13/20 0900)  SpO2: 96 % (01/13/20 0900)  O2 Device (Oxygen Therapy): room air (01/12/20 2222) Vital Signs (24h Range):  Temp:  [97.4 °F (36.3 °C)-98.4 °F (36.9 °C)] (P) 98 °F (36.7 °C)  Pulse:  [] 117  Resp:  [12-19] (P) 16  SpO2:  [94 %-98 %] 96 %  BP: (115-150)/(57-83) (P) 126/71     Weight: 122.6 kg (270 lb 4.5 oz) (01/13/20 0500)  Body mass index is  39.91 kg/m².  Body surface area is 2.44 meters squared.    I/O last 3 completed shifts:  In: 1332.6 [P.O.:1105; I.V.:77.6; Other:150]  Out: 4975 [Urine:3975; Other:1000]    Physical Exam   Constitutional: He is oriented to person, place, and time. No distress.   Sick appearing male   HENT:   Head: Normocephalic and atraumatic.   Right Ear: External ear normal.   Left Ear: External ear normal.   Nose: Nose normal.   Eyes: Pupils are equal, round, and reactive to light. EOM are normal. No scleral icterus.   Neck: JVD present. No tracheal deviation present.   Cardiovascular: Normal rate, regular rhythm and intact distal pulses. Exam reveals gallop.   Pulses:       Radial pulses are 2+ on the right side, and 2+ on the left side.   Pulmonary/Chest: Effort normal. No stridor. He has no wheezes. He has rales (bibasilar).   Abdominal: Soft. There is no tenderness. There is no guarding.   Musculoskeletal: He exhibits edema and tenderness.   Neurological: He is alert and oriented to person, place, and time.   asterixis   Skin: Skin is warm and dry. Rash (chronic venous stasis ulceration to the lower extremities bilaterally) noted. He is not diaphoretic.   Psychiatric: He has a normal mood and affect. His behavior is normal.   Nursing note and vitals reviewed.      Significant Labs:  CBC:   Recent Labs   Lab 01/13/20  0508   WBC 8.82   RBC 2.48*   HGB 6.3*   HCT 21.3*      MCV 86   MCH 25.4*   MCHC 29.6*     CMP:   Recent Labs   Lab 01/13/20  0508   *   CALCIUM 9.9   ALBUMIN 3.3*   PROT 8.7*      K 3.7   CO2 32*   CL 94*   *   CREATININE 2.3*   ALKPHOS 197*   ALT 18   AST 26   BILITOT 1.6*     All labs within the past 24 hours have been reviewed.     Significant Imaging:  Labs: Reviewed

## 2020-01-13 NOTE — PROGRESS NOTES
Consult received per RN for skin breakdown to bilateral lower legs.     Pt was admitted on 12/24/19 with acute on chronic combined systolic and diastolic congestive heart failure. Pt has a PMHx of chronic combined systolic and diastolic heart failure (EF 25%), afib on Coumadin, CAD, CVA, and history of PE.    Received pt sitting up in chair awake and alert c/o nausea with hospice nurse at bedside. Pt's nurse is aware and did bring pt nausea medication. Pt reports that he cannot tolerate laying flat.     Upon assessment of bilateral lower legs: hairless edematous legs woody in appearance with a small open area of partial skin thickness noted to the right lower leg measuring 0.5 cm x 0.5 cm. No weeping or skin breakdown noted at this time to the left lower leg.     (see assessment and photos below)    Cleansed right lower leg wound with sterile normal saline and applied hydrafera blue ready to the wound bed with a border foam to cover. Applied sween cream lightly to bilateral lower legs. Encouraged pt if he could tolerate it to elevate his legs to help with his lower leg edema. Wound care nurse will order a wheel chair cushion as a preventative measure.    Recommendations:  Recommendations:  -Nursing to cleanse all right lower leg wound with sterile normal saline with hydrafera blue ready to the wound bed with a border foam to cover. Nursing staff to place hydrofera blue ready shiny side up when placing it on the wound bed. If pt can tolerate it, pt would benefit from elevating his bilateral lower legs to help with edema. Hydrofera Blue Ready provides a barrier to moisture and antibacterial protection to promote wound healing.   -Nursing to apply Sween Cream (PINK TOP) lightly daily and PRN to bilateral lower legs. Sween cream is a moisturizing agent to help skin maintain elasticity and sooth chapped dry skin.  -Nursing to continue pressure prevention interventions as directed. Please encourage pt to offload every 2  hours, apply heel lift boots/foams or apply a pillow beneath pt's bilateral heels to help offload from bed or chair surface, and apply border foams as needed to protect bony prominences.    Plan of care discussed with pt, pt's nurse and with Dr. Goins via secure chat. Wound care will continue to follow pt PRN. [id number]         01/13/20 1124        Wound 01/13/20 Ulceration lower Leg   Date First Assessed: 01/13/20   Primary Wound Type: Ulceration  Side: Right  Orientation: lower  Location: Leg   Wound Image    Wound WDL ex   Dressing Appearance Open to air;No dressing   Drainage Amount None   Drainage Characteristics/Odor No odor   Appearance Pink;Red;Moist   Tissue loss description Partial thickness   Periwound Area Edematous;Other (see comments)  (woody edema)   Wound Edges Open   Wound Length (cm) 0.5 cm   Wound Width (cm) 0.5 cm   Wound Depth (cm) 0.1 cm   Wound Volume (cm^3) 0.02 cm^3   Wound Surface Area (cm^2) 0.25 cm^2   Care Cleansed with:;Sterile normal saline;Applied:;Moisturizing agent   Dressing Applied;Methylene blue/gentian violet;Foam   Off Loading Other (see comments)  (wound care nurse to order wheel chair cushion)   Dressing Change Due 01/16/20

## 2020-01-13 NOTE — PROGRESS NOTES
Progress Note  Hospital Medicine    Provider team:    [facility] [place] [facility]  [facility] [place] [facility]  Admit Date: 12/24/2019  Encounter Date: 01/13/2020     SUBJECTIVE:     Follow-up Visit for: Acute on chronic combined systolic and diastolic congestive heart failure    HPI (See H&P for complete P,F,SHx):  54M with chronic combined systolic and diastolic heart failure (EF 25%), afib on Coumadin, CAD, CVA, and history of PE who presents to the ED for evaluation of shortness of breath and edema.  He was just admitted to Hospital Medicine from 11/24-12/8 for a CHF exacerbation.  During that admission, he was evaluated by Cardiology who started him on Dobutamine and Diuril.  He was resistant to HTS evaluation during that stay, which included recommendations for LVAD, transplant, and ICD.  He was discharged home on Lasix 80mg PO BID.  He endorses compliance with his diuretic regimen, but despite this and following a low salt diet, he has continued to gain weight, have lower extremity and abdominal swelling, as well as shortness of breath.  He is so short of breath that he has to sleep in a chair, and has been unable to sleep because of his breathing.  He denies any chest pain.  He does have a cough that is mostly dry, but occasionally productive of blood tinged sputum.  He was discharged weight a weight of 259lbs, and claims that is around his dry weight.  He is currently 286lbs.       Upon arrival to the ED, vitals were /83, HR 88, RR 22.  Labs showed Hemoglobin 7.3, INR 1.5, Creatinine 1.8, Alk Phos 278,  with Trop 0.128. CXR showed cardiomegaly and pulmonary edema, along with a masslike opacity. CT chest was attempted to be ordered to evaluate the mass, but he was unable to lie flat to get it evaluated.  He was given Lasix 80mg IV and was admitted to Hospital Medicine for further management.    TTE 11/27/2019  · Severely decreased LVSF.  · The estimated ejection fraction is 25%  · Concentric left  ventricular hypertrophy.  · Global hypokinetic wall motion.  · Moderate right ventricular enlargement.  · Moderately reduced RVSF.  · Severe biatrial enlargement.  · Moderate mitral regurgitation.  · Moderate tricuspid regurgitation.  · The estimated PA systolic pressure is 40 mm Hg  · Elevated central venous pressure (15 mm Hg).  · Atrial fibrillation observed.    Hospital course:  Patient started on IV diuretics and  infusion for diagnosis of acute on chronic combined heart failure. His weight was stagnant and UOP tapered, so he was transitioned to IVP lasix for IV lasix gtt as this was actually effective during prior admission under guidance from co-management cardiology. However, the patient actually worsened symptomatically and TBili increased along with Cr and weight increase. The patient is now started back on lasix infusion with diuril augmentation. The patient's course is also c/b epistaxis, possibly related to his decompensated picture. He has required pRBC transfusion.     Interval history:  Tolerated 750 mL volume removal via dialysis yesterday. Unable to resume Lasix gtt last night due to lack of peripheral IV. Received 120 mg IV Lasix w/ brief holding of  gtt. Pt vomited his breakfast this AM. Denies abdominal pain or constipation. Resume Lasix gtt today with bolus when IV obtained.     Wt Readings from Last 30 Encounters:   01/13/20 122.6 kg (270 lb 4.5 oz)   12/07/19 117.6 kg (259 lb 4.2 oz)   10/23/19 108.9 kg (240 lb)   09/09/19 108.9 kg (240 lb)   08/04/19 105.2 kg (232 lb)   07/18/19 108.2 kg (238 lb 8.6 oz)   06/20/19 118.9 kg (262 lb 2 oz)   05/21/19 121.1 kg (267 lb)   05/16/19 116.3 kg (256 lb 6.3 oz)   04/20/19 127.5 kg (281 lb 1.4 oz)   01/31/19 113.4 kg (250 lb)   11/04/18 123.1 kg (271 lb 6.2 oz)   08/31/18 119.6 kg (263 lb 10.7 oz)   07/28/18 113.4 kg (250 lb)   07/10/18 120.7 kg (266 lb)   05/03/18 117.9 kg (259 lb 14.8 oz)   03/30/18 117.9 kg (260 lb)   02/21/18 117.9 kg (260 lb)    01/12/18 113.4 kg (250 lb)   12/10/17 117.9 kg (260 lb)   11/11/17 113.9 kg (251 lb)   11/01/17 111.8 kg (246 lb 7.6 oz)   10/26/17 118.5 kg (261 lb 3.2 oz)   08/13/17 117.9 kg (260 lb)   08/12/17 117.9 kg (260 lb)   05/10/17 117.9 kg (260 lb)   04/05/17 108.4 kg (239 lb 1.4 oz)   02/01/17 108.9 kg (240 lb)   12/15/16 119.4 kg (263 lb 3.7 oz)   08/26/16 112.5 kg (248 lb)     Review of Systems:  Review of Systems   Constitutional: Negative for chills and fever.   HENT: Negative for congestion and sore throat.    Eyes: Negative for photophobia, pain and discharge.   Respiratory: Positive for shortness of breath. Negative for cough, hemoptysis and sputum production.    Cardiovascular: Positive for orthopnea, leg swelling and PND. Negative for chest pain and palpitations.   Gastrointestinal: Negative for abdominal pain, diarrhea, nausea and vomiting.   Genitourinary: Negative for dysuria and urgency.   Musculoskeletal: Negative for myalgias and neck pain.   Skin: Negative for itching and rash.   Neurological: Negative for sensory change, focal weakness and headaches.   Endo/Heme/Allergies: Negative for polydipsia. Does not bruise/bleed easily.   Psychiatric/Behavioral: Negative for depression and suicidal ideas.     Past Medical History:   Diagnosis Date    Anticoagulant long-term use     Cardiomegaly     Chronic combined systolic and diastolic congestive heart failure     Coronary artery disease     Heart attack 2006    Hypertension     Hyperthyroidism, subclinical 1/2/2013    MI (myocardial infarction) 9/22/2013    MI in 2009      Paroxysmal atrial fibrillation     PE (pulmonary embolism) 1/1/2013    IN 2010     S/P ablation of atrial flutter 2008    Stroke 2009    no residual weaknesses     Social History     Socioeconomic History    Marital status: Single     Spouse name: Not on file    Number of children: Not on file    Years of education: Not on file    Highest education level: Not on file  "  Occupational History    Not on file   Social Needs    Financial resource strain: Not on file    Food insecurity:     Worry: Not on file     Inability: Not on file    Transportation needs:     Medical: Not on file     Non-medical: Not on file   Tobacco Use    Smoking status: Never Smoker    Smokeless tobacco: Never Used   Substance and Sexual Activity    Alcohol use: No    Drug use: No    Sexual activity: Never   Lifestyle    Physical activity:     Days per week: Not on file     Minutes per session: Not on file    Stress: Not on file   Relationships    Social connections:     Talks on phone: Not on file     Gets together: Not on file     Attends Orthodox service: Not on file     Active member of club or organization: Not on file     Attends meetings of clubs or organizations: Not on file     Relationship status: Not on file   Other Topics Concern    Not on file   Social History Narrative    Not on file       OBJECTIVE:       Intake/Output Summary (Last 24 hours) at 1/13/2020 1051  Last data filed at 1/13/2020 0500  Gross per 24 hour   Intake 827.6 ml   Output 2700 ml   Net -1872.4 ml     Vital Signs Range (Last 24H):  Temp:  [97.4 °F (36.3 °C)-98.4 °F (36.9 °C)]   Pulse:  []   Resp:  [12-19]   BP: (115-150)/(57-83)   SpO2:  [94 %-98 %]   Body mass index is 39.91 kg/m².    Objective:  General Appearance:  Comfortable, well-appearing, in no acute distress and not in pain.    Vital signs: (most recent): Blood pressure (P) 126/71, pulse (!) (P) 111, temperature (P) 98 °F (36.7 °C), resp. rate (P) 16, height 5' 9" (1.753 m), weight 122.6 kg (270 lb 4.5 oz), SpO2 (P) 96 %.  No fever.    Output: Producing urine and producing stool.    HEENT: Normal HEENT exam.  (+JVD)    Lungs:  Normal effort.  Breath sounds clear to auscultation.  He is not in respiratory distress.  There are rales.  No wheezes.    Heart: Normal rate.  Regular rhythm.  S1 normal and S2 normal.  Positive for murmur.    Chest: " Symmetric chest wall expansion.   Abdomen: Abdomen is soft.  Bowel sounds are normal.   There is no abdominal tenderness.     Extremities: Normal range of motion.  There is venous stasis and dependent edema.  There is no deformity, effusion or local swelling.    Pulses: Distal pulses are intact.    Neurological: Patient is alert and oriented to person, place and time.  Normal strength.    Pupils:  Pupils are equal, round, and reactive to light.    Skin:  Warm and dry.      Medications:  Medication list was reviewed in EPIC and changes noted under Assessment/Plan and MAR.    Laboratory:  Recent Labs     01/13/20  0508   WBC 8.82   RBC 2.48*   HGB 6.3*   HCT 21.3*      MCV 86   MCH 25.4*   MCHC 29.6*   GRAN 74.1*  6.5   LYMPH 7.6*  0.7*   MONO 17.2*  1.5*   EOS 0.0      Recent Labs     01/13/20  0508   *      K 3.7   CL 94*   CO2 32*   *   CREATININE 2.3*   CALCIUM 9.9   ANIONGAP 16   MG 2.5   PHOS 3.5     Prior records reviewed.    ECHO reviewed:  · Severely decreased left ventricular systolic function. The estimated ejection fraction is 25%  · Concentric left ventricular hypertrophy.  · Global hypokinetic wall motion.  · Moderate right ventricular enlargement.  · Moderately reduced right ventricular systolic function.  · Severe biatrial enlargement.  · Moderate mitral regurgitation.  · Moderate tricuspid regurgitation.  · The estimated PA systolic pressure is 40 mm Hg  · Elevated central venous pressure (15 mm Hg).  · Atrial fibrillation observed.      Imaging reviewed  Imaging Results          CT Chest Without Contrast (No Result on File)                X-Ray Chest AP Portable (Final result)  Result time 12/24/19 19:09:07    Final result by Zeb Hirsch MD (12/24/19 19:09:07)                 Impression:      Masslike opacity overlying the right lower lung zone, similar to prior exam.  Right pleural fluid not excluded.  Chest CT recommended for further evaluation of right lung  masslike opacity.    Patchy bilateral pulmonary opacities, similar to prior exam.    Cardiomegaly.    Electronically signed by resident: Cristofer Reddy  Date:    12/24/2019  Time:    18:48    Electronically signed by: Zeb Hirsch MD  Date:    12/24/2019  Time:    19:09             Narrative:    EXAMINATION:  XR CHEST AP PORTABLE    CLINICAL HISTORY:  CHF;    TECHNIQUE:  Single frontal view of the chest was performed.    COMPARISON:  Chest radiograph 12/03/2019    FINDINGS:  Cardiac leads overlie the chest wall.    Redemonstration of extensive bilateral patchy opacities, more prominent on the right with a focal area of masslike opacity in the right lower lung zone.  No significant change from prior exam.  Left costophrenic angle is visible and right sided pleural fluid not excluded noting presence of opacification.  No pneumothorax.    The cardiac silhouette is enlarged.  Hilar and mediastinal contours are unchanged.    Bones are intact.                                  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 [I13.0]  Yes    Elevated alkaline phosphatase level [R74.8]  Yes    Coronary artery disease [I25.10]  Yes     Chronic    Stage 3 chronic kidney disease [N18.3]  Yes    Subtherapeutic international normalized ratio (INR) [R79.1]  Yes    Personal history of cerebral embolism [Z86.79]  Not Applicable    Personal history of venous thrombosis and embolism [Z86.718]  Not Applicable    Personal history of MI (myocardial infarction) [I25.2]  Not Applicable    Essential hypertension [I10]  Yes     Chronic    Atrial fibrillation, chronic [I48.20]  Yes      Chronic    Chronic anticoagulation [Z79.01]  Not Applicable     Chronic    Cardiomyopathy [I42.9]  Yes     Chronic      Resolved Hospital Problems   No resolved problems to display.      Acute on Chronic Combined Systolic and Diastolic Heart Failure  Cardiomyopathy  · CHF Pathway initiated  · Last 2D echo Nov 2019 with EF 25%  ·  and CXR with pulmonary edema and cardiomegaly  · Started on Lasix gtt, increased to 40 mg/hr. Failed transition to high dose IV Lasix pushes. Subsequently started back on Lasix gtt  · Changed Diuril to BID Metolazone 10 mg per Nephro recs  · Cont  gtt 2.5, which will be continued at home. Will need long-term IV placement when he nears discharge. PICC placement may be an issue given his renal failure   · Cardiology Co-mgmt consulted. Recommend Nephro consult for temporary HD for volume removal due to diuretic resistance.   · Goal diuresis 2-3L/day.  Home diuretic dose:  Lasix 80mg PO BID  · BID BMP (with morning labs and at 4pm)  · Strict I&Os with daily weights.  · Hold BB, okay to c/w BiDil     JEAN-PIERRE on CKD3  · Baseline 1.8  · Cardiorenal   · BUN has been rising  · S/p HD session on 1/12. Continue diuresis as Nephrology is not planning on dialyzing today.     Chronic AFib  Long Term Use of Anticoagulants  Supratherapeutic INR  · Chronic issue  · HR in 100-120's  · INR initially subtherapeutic. Dose increased to 7.5mg. Then supratherapeutic so held coumadin. INR now therapeutic, continue 6 mg daily. Pharmacy consulted for dosing management     Essential HTN  · Chronic and stable  · Continue Bidil BID     CAD  History of MI  · Chronic and stable  · Continue Aspirin 81mg PO daily  · Continue Bidil BID     Anemia  · Received 5 units PRBCs on prior hospital admission  · Continue PO iron  · Type and screen obtained  · Monitor for bleeding/need for transfusion  · Transfused 1U pRBC after episode of epistaxis noted. Refusing further transfusions at this time  · Suspect dilutional at  this time     History of VTE  History of Stroke  · Chronic and stable  · Continue Aspirin 81mg PO daily  · Continue Warfarin when INR appropriate      Elevated Alk Phos  · Chronic and stable  · Monitor     Benign Hypertensive Heart and Kidney Disease with HF and CKD  · As above     Abnormal CT  · Needs CT chest when able to lie flat or outpt to evaluate masslike opacity seen on CXR     Diet:  Low Sodium, 1 L fluid restriction  VTE PPx:  Warfarin  Goals of Care:  Full     High Risk Conditions:   Patient is currently on drug therapy requiring intensive monitoring for toxicity: Lasix gtt    Anticipated discharge date and disposition:   Pending volume removal/diuresis. Home hospice on discharge. Pt w/ very poor insight into disease process and will likely continue to be readmitted to the hospital    Antoinette Goins MD  [facility]

## 2020-01-13 NOTE — ASSESSMENT & PLAN NOTE
27 yo male with HFrEF (25%), CKD 3, and multiple hospital admissions for heart failure exacerbations presenting this admission for acute on chronic heart failure. Despite aggressive diuresis with Lasix 120mg TID, decreased urinary output. He was later transitioned to lasix gtt at 30 mg/hr with multiple doses of diuril without improvement in UPO. Nephrology consult for volume overload refractory to diuresis.    Plan/Recommendation:  - on dobutamine gtt as per cardiology

## 2020-01-13 NOTE — NURSING
Pt does not have peripheral IV access. Only R IJ used for dialysis with pigtail. Pt has dobutamine infusing to pigtail. Pt refused to allow attempt for peripheral IV access tonight. Pt stated to try tomorrow while sitting in his susan. Notified [facility] told to administer  mg bolus of lasix, while pausing dobutamine and hold continuous lasix infusion until peripheral IV access is obtained.     Will notify [facility] in 6 hours of pt urine output. Will continue to monitor.

## 2020-01-13 NOTE — PROGRESS NOTES
[facility]  Nephrology  Progress Note    Patient Name: Hamlet Terrell  MRN: [id number]  Admission Date: 12/24/2019  Hospital Length of Stay: 20 days  Attending Provider: Antoinette Goins MD   Primary Care Physician: Carlin Swift MD  Principal Problem:Acute on chronic combined systolic and diastolic congestive heart failure    Subjective:     HPI: The pt is a 55 yo M with chronic combined HF (EF 25% on 11/27), atrial fibrillation on chronic AC (coumadin), CAD, CVA, and history of PE that presented to the ED with SOB & edema. Nephrology was consulted for assistance with management of cardiorenal syndrome in the setting of JEAN-PIERRE on CKD III now resistive to diuretic management. Baseline Cr appears to be 1.6-1.9. Cr elevated to 3.1 at time of consult. Of note, he was recently admitted to Hospital Medicine from 11/24-12/8 for a CHF exacerbation.  During that admission, he was evaluated by Cardiology who started him on Dobutamine and Diuril.  He was DC'ed home on Lasix 80 mg po BID.  He reports that he was compliant with this regimen, but despite this, he had continued to gain weight, have lower extremity and abdominal swelling, as well as shortness of breath. He was discharged with a weight of 259lbs, and claims that is around his dry weight. He is currently 283 lbs.       Per Dr. Goins:   Patient started on IV diuretics and  infusion for diagnosis of acute on chronic combined heart failure. His weight was stagnant and UOP tapered, so he was transitioned to IVP lasix for IV lasix gtt as this was actually effective during prior admission under guidance from co-management cardiology. However, the patient actually worsened symptomatically and TBili increased along with Cr and weight increase. The patient is now started back on lasix infusion with diuril augmentation. The patient's course is also c/b epistaxis, possibly related to his decompensated picture. He has required pRBC transfusion. He refuses to  apply pressure to the bridge of his nose as he says he cannot breathe through his mouth.  The patient's course is also c/b fever, likely due to pRBC transfusion.     Cardiology consulted for diuretic resistance. Despite two-week hospital stay thus far, he is only down 3 lbs. Nephrology consulted for acute renal failure and consideration of temporary HD for volume removal. Pt continues to exhibit poor insight into his disease. His wishes (to get healthy, ride his bike, walk to the store, return to the hospital if needed) is not on par with hospice. He reported similar feelings to Palliative Care yesterday. Discussed with Palliative Care provider. He is however now amenable for home dobutamine. PICC placement will likely be an issue given his current renal failure. Hb 6.9 secondary to renal failure; pt would like to hold off on transfusion today.    Interval History: Patient seen and examined at bedside, BUN still elevated, underwent 2 hours of HD yesterday.     Review of patient's allergies indicates:   Allergen Reactions    Acetaminophen      Itching    Oxycodone-acetaminophen      Other reaction(s): Itching    Ace inhibitors Other (See Comments)     cough     Current Facility-Administered Medications   Medication Frequency    acetaminophen tablet 650 mg Q4H PRN    aspirin EC tablet 81 mg Daily    dextrose 10% (D10W) Bolus PRN    dextrose 10% (D10W) Bolus PRN    diphenhydrAMINE capsule 25 mg Q6H PRN    DOBUTamine 500mg in D5W 250mL infusion (premix) (NON-TITRATING) Continuous    ferrous sulfate EC tablet 325 mg Daily    glucagon (human recombinant) injection 1 mg PRN    glucose chewable tablet 16 g PRN    glucose chewable tablet 24 g PRN    isosorbide-hydrALAZINE 20-37.5 mg per tablet 1 tablet BID    melatonin tablet 6 mg Nightly PRN    methocarbamol tablet 500 mg TID PRN    methyl salicylate-menthol 15-10% cream TID PRN    morphine injection 4 mg Q6H PRN    ondansetron injection 8 mg Q8H PRN     oxyCODONE immediate release tablet 15 mg Q4H PRN    potassium chloride 10% oral solution 20 mEq Q2H    promethazine (PHENERGAN) 25 mg in dextrose 5 % 50 mL IVPB Q6H PRN    senna-docusate 8.6-50 mg per tablet 1 tablet BID PRN    sodium chloride 0.65 % nasal spray 1 spray PRN    warfarin (COUMADIN) tablet 6 mg Daily       Objective:     Vital Signs (Most Recent):  Temp: (P) 98 °F (36.7 °C) (01/13/20 0900)  Pulse: (!) 117 (01/13/20 1116)  Resp: (P) 16 (01/13/20 0900)  BP: (P) 126/71 (01/13/20 0900)  SpO2: 96 % (01/13/20 0900)  O2 Device (Oxygen Therapy): room air (01/12/20 2222) Vital Signs (24h Range):  Temp:  [97.4 °F (36.3 °C)-98.4 °F (36.9 °C)] (P) 98 °F (36.7 °C)  Pulse:  [] 117  Resp:  [12-19] (P) 16  SpO2:  [94 %-98 %] 96 %  BP: (115-150)/(57-83) (P) 126/71     Weight: 122.6 kg (270 lb 4.5 oz) (01/13/20 0500)  Body mass index is 39.91 kg/m².  Body surface area is 2.44 meters squared.    I/O last 3 completed shifts:  In: 1332.6 [P.O.:1105; I.V.:77.6; Other:150]  Out: 4975 [Urine:3975; Other:1000]    Physical Exam   Constitutional: He is oriented to person, place, and time. No distress.   Sick appearing male   HENT:   Head: Normocephalic and atraumatic.   Right Ear: External ear normal.   Left Ear: External ear normal.   Nose: Nose normal.   Eyes: Pupils are equal, round, and reactive to light. EOM are normal. No scleral icterus.   Neck: JVD present. No tracheal deviation present.   Cardiovascular: Normal rate, regular rhythm and intact distal pulses. Exam reveals gallop.   Pulses:       Radial pulses are 2+ on the right side, and 2+ on the left side.   Pulmonary/Chest: Effort normal. No stridor. He has no wheezes. He has rales (bibasilar).   Abdominal: Soft. There is no tenderness. There is no guarding.   Musculoskeletal: He exhibits edema and tenderness.   Neurological: He is alert and oriented to person, place, and time.   asterixis   Skin: Skin is warm and dry. Rash (chronic venous stasis ulceration  to the lower extremities bilaterally) noted. He is not diaphoretic.   Psychiatric: He has a normal mood and affect. His behavior is normal.   Nursing note and vitals reviewed.      Significant Labs:  CBC:   Recent Labs   Lab 01/13/20  0508   WBC 8.82   RBC 2.48*   HGB 6.3*   HCT 21.3*      MCV 86   MCH 25.4*   MCHC 29.6*     CMP:   Recent Labs   Lab 01/13/20  0508   *   CALCIUM 9.9   ALBUMIN 3.3*   PROT 8.7*      K 3.7   CO2 32*   CL 94*   *   CREATININE 2.3*   ALKPHOS 197*   ALT 18   AST 26   BILITOT 1.6*     All labs within the past 24 hours have been reviewed.     Significant Imaging:  Labs: Reviewed    Assessment/Plan:     * Acute on chronic combined systolic and diastolic congestive heart failure  27 yo male with HFrEF (25%), CKD 3, and multiple hospital admissions for heart failure exacerbations presenting this admission for acute on chronic heart failure. Despite aggressive diuresis with Lasix 120mg TID, decreased urinary output. He was later transitioned to lasix gtt at 30 mg/hr with multiple doses of diuril without improvement in UPO. Nephrology consult for volume overload refractory to diuresis.    Plan/Recommendation:  - on dobutamine gtt as per cardiology            Palliative care encounter  Palliative care and hospice currently following   - Ongoing goals of care discussion- states he would be okay with dialysis while inpatient, but does not want to pursue dialysis outpatient, discussed trialysis line placement- patient states he needs more time to consider      Stage 3 chronic kidney disease  Mr. Terrell is a 53 y/o male with HFrEF (last 2D ECHO EF 23%, moderate MR, moderate TR, diastolic dysfunction), hypertension, chronic atrial fibrillation, a flutter status post ablation, CVA in 2009, coronary artery disease, CKD III, and obstructive sleep apnea presents with ADCHF. Nephro consulted for JEAN-PIERRE on CKD in the setting of Cardiorenal syndrome.   - JEAN-PIERRE was thought to be to be  multifactorial from labile BP, and component of cardiorenal syndrome  - Creatinine up trending since admission, baseline 1.6-1.9, currently 3.0   - Azotemia worsening daily. No uremic symptoms present.     Plan/Rec  - 2nd HD session today for 2 hours with F6 dialyzer for metabolic exchange  - recommend holding diuretics at this time  - Strict I/O and chart  - Avoid nephrotoxic medications, NSAIDs, IV contrast, etc.  - Daily weights and chart  - Medication doses adjusted to GFR  - Maintain MAP > 65  - Hb > 7 gm/dL  - Will follow closely        Thank you for your consult. I will follow-up with patient. Please contact us if you have any additional questions.    Pankaj Finley MD  Nephrology  [facility]

## 2020-01-13 NOTE — ASSESSMENT & PLAN NOTE
Mr. Terrell is a 55 y/o male with HFrEF (last 2D ECHO EF 23%, moderate MR, moderate TR, diastolic dysfunction), hypertension, chronic atrial fibrillation, a flutter status post ablation, CVA in 2009, coronary artery disease, CKD III, and obstructive sleep apnea presents with ADCHF. Nephro consulted for JEAN-PIERRE on CKD in the setting of Cardiorenal syndrome.   - JEAN-PIERRE was thought to be to be multifactorial from labile BP, and component of cardiorenal syndrome  - Creatinine up trending since admission, baseline 1.6-1.9, currently 3.0   - Azotemia worsening daily. No uremic symptoms present.     Plan/Rec  - 2nd HD session today for 2 hours with F6 dialyzer for metabolic exchange  - recommend holding diuretics at this time  - Strict I/O and chart  - Avoid nephrotoxic medications, NSAIDs, IV contrast, etc.  - Daily weights and chart  - Medication doses adjusted to GFR  - Maintain MAP > 65  - Hb > 7 gm/dL  - Will follow closely

## 2020-01-13 NOTE — PROGRESS NOTES
Report received from DIANA Hull. Pt arrived from floor AAOx4. Both lumens of a RIJ cvc draw back and flush very slugishly unable start dialysis tx. Dr. Wesley notified and pt sent back to room via pt transport. Report given to DIANA Hull.

## 2020-01-13 NOTE — SUBJECTIVE & OBJECTIVE
Interval History: Patient has remained on  gtt at 2.5 mcg/kg since admission. He initially received several doses of IV Diuril but diuresis was limited. He responded well after Lasix gtt was increased to 30 mg/hr. Lasix gtt was stopped and patient was started on Lasix IVP on 1/3 but he began to worsen symptomatically. He received IV Diuril on 1/4 and was started back on Lasix gtt on 1/5. He had an episode of epitaxis on 1/4 and required 1 unit PRBC transfusion after Hgb dropped to 6.8. He again received Diuril on 1/6 and 1/7. He agreed to temporary dialysis for volume removal and had a CVC placed on 1/11. He had his first HD session on 1/12. He is scheduled for HD again this afternoon. Lasix held for HD.    Past Medical History:   Diagnosis Date    Anticoagulant long-term use     Cardiomegaly     Chronic combined systolic and diastolic congestive heart failure     Coronary artery disease     Heart attack 2006    Hypertension     Hyperthyroidism, subclinical 1/2/2013    MI (myocardial infarction) 9/22/2013    MI in 2009      Paroxysmal atrial fibrillation     PE (pulmonary embolism) 1/1/2013    IN 2010     S/P ablation of atrial flutter 2008    Stroke 2009    no residual weaknesses       Past Surgical History:   Procedure Laterality Date    COLONOSCOPY N/A 12/2/2019    Procedure: COLONOSCOPY;  Surgeon: Scott Rosario MD;  Location: [facility] ([address] [place] [address]);  Service: Endoscopy;  Laterality: N/A;    ESOPHAGOGASTRODUODENOSCOPY N/A 12/2/2019    Procedure: EGD (ESOPHAGOGASTRODUODENOSCOPY);  Surgeon: Scott Rosario MD;  Location: [facility] ([address] [place] [address]);  Service: Endoscopy;  Laterality: N/A;    RADIOFREQUENCY ABLATION  01/08/2008    for atrial flutter       Review of patient's allergies indicates:   Allergen Reactions    Acetaminophen      Itching    Oxycodone-acetaminophen      Other reaction(s): Itching    Ace inhibitors Other (See Comments)     cough       Medications:  Continuous Infusions:    DOBUTamine 2.5 mcg/kg/min (01/12/20 1500)    furosemide (LASIX) 2 mg/mL infusion (non-titrating)       Scheduled Meds:   aspirin  81 mg Oral Daily    ferrous sulfate  325 mg Oral Daily    furosemide  120 mg Intravenous Once    isosorbide-hydrALAZINE 20-37.5 mg  1 tablet Oral BID    metOLazone  10 mg Oral Once    warfarin  6 mg Oral Daily     PRN Meds:acetaminophen, Dextrose 10% Bolus, Dextrose 10% Bolus, diphenhydrAMINE, glucagon (human recombinant), glucose, glucose, melatonin, methocarbamol, methyl salicylate-menthol 15-10%, morphine, ondansetron, oxyCODONE, promethazine (PHENERGAN) IVPB, senna-docusate 8.6-50 mg, sodium chloride    Family History     Problem Relation (Age of Onset)    Alcohol abuse Father    Hypertension Mother, Father, Sister, Brother    Stroke Mother        Tobacco Use    Smoking status: Never Smoker    Smokeless tobacco: Never Used   Substance and Sexual Activity    Alcohol use: No    Drug use: No    Sexual activity: Never       Review of Systems   Constitutional: Negative for appetite change and fatigue.   HENT: Negative for sore throat and trouble swallowing.    Respiratory: Positive for shortness of breath. Negative for cough and wheezing.    Cardiovascular: Positive for leg swelling. Negative for chest pain.   Gastrointestinal: Positive for constipation and nausea. Negative for diarrhea and vomiting.   Genitourinary: Negative for dysuria and hematuria.   Musculoskeletal: Positive for arthralgias (hips) and back pain. Negative for neck pain.   Skin: Positive for wound (RLE). Negative for rash.   Neurological: Negative for dizziness and light-headedness.   Psychiatric/Behavioral: Negative for confusion and dysphoric mood. The patient is not nervous/anxious.      Objective:     Vital Signs (Most Recent):  Temp: 98.3 °F (36.8 °C) (01/13/20 1445)  Pulse: 103 (01/13/20 1520)  Resp: 17 (01/13/20 1445)  BP: 122/75 (01/13/20 1445)  SpO2: 96 % (01/13/20 0900) Vital Signs (24h  Range):  Temp:  [97.6 °F (36.4 °C)-98.4 °F (36.9 °C)] 98.3 °F (36.8 °C)  Pulse:  [] 103  Resp:  [12-19] 17  SpO2:  [94 %-98 %] 96 %  BP: (116-141)/(59-83) 122/75     Weight: 122.6 kg (270 lb 4.5 oz)  Body mass index is 39.91 kg/m².    Review of Symptoms  Symptom Assessment (ESAS 0-10 scale)   ESAS 0 1 2 3 4 5 6 7 8 9 10   Pain    X          Dyspnea  X            Anxiety X             Nausea X             Depression  X             Anorexia X             Fatigue X             Insomnia X             Restlessness  X             Agitation X             CAM / Delirium _X_ --  ___+   Constipation     __ --  _X_+   Diarrhea           _X_ --  ___+  Bowel Management Plan (BMP): Yes    Comments: Senna-Docusate ordered BID PRN    Pain Assessment: Location: bilateral hips, legs and back  Quality: shooting  Quantity: 3/10 in intensity  Aggravating factors: worsens with movement or activity  Alleviating factors: improves with Morphine and Oxycodone      OME in 24 hours: 30    Performance Status: 50    Physical Exam   Constitutional: He is oriented to person, place, and time. He appears well-developed and well-nourished.   On  gtt   HENT:   Head: Normocephalic and atraumatic.   Eyes: Conjunctivae and EOM are normal.   Neck: Normal range of motion. Neck supple.   Cardiovascular: Normal rate and regular rhythm.   Pulmonary/Chest: Effort normal. No respiratory distress.   Abdominal: Soft. There is no tenderness.   Musculoskeletal: Normal range of motion. He exhibits edema.   Neurological: He is alert and oriented to person, place, and time.   Skin: Skin is warm and dry.   Dressing to RLE ulceration   Psychiatric: His behavior is normal. Thought content normal. Cognition and memory are normal.   Vitals reviewed.      Significant Labs: All pertinent labs within the past 24 hours have been reviewed.  CBC:   Recent Labs   Lab 01/13/20  0508   WBC 8.82   HGB 6.3*   HCT 21.3*   MCV 86        BMP:  Recent Labs   Lab  20  0508   *      K 3.7   CL 94*   CO2 32*   *   CREATININE 2.3*   CALCIUM 9.9   MG 2.5     LFT:  Lab Results   Component Value Date    AST 26 2020     (H) 2019    ALKPHOS 197 (H) 2020    BILITOT 1.6 (H) 2020     Albumin:   Albumin   Date Value Ref Range Status   2020 3.3 (L) 3.5 - 5.2 g/dL Final     Protein:   Total Protein   Date Value Ref Range Status   2020 8.7 (H) 6.0 - 8.4 g/dL Final     Lactic acid:   Lab Results   Component Value Date    LACTATE 1.0 2019    LACTATE 1.0 2019       Significant Imaging: I have reviewed all pertinent imaging results/findings within the past 24 hours.    Advance Care Planning   Advanced Directives::  Living Will: No  LaPOST: No  Do Not Resuscitate Status: No  Medical Power of : No. Patient identifies brother Kris [place] ([phone]) as his surrogate decision maker.    Decision-Making Capacity: Patient answered questions       Living Arrangements: Lives alone    Psychosocial/Cultural:  Patient is unmarried and has no children. His parents are . He has 5 siblings and a lot of cousins and friends. He lives alone in [place] but says his brother Kris lives right next to him. 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 He says that his goal is to get healthy. He worries about dying.    Spiritual:     F- Melina and Belief: does not identify with a particular Mosque but prays to God    I - Importance: somewhat important  .  C - Community: does not belong to a [employer]    A - Address in Care:  visits

## 2020-01-13 NOTE — PLAN OF CARE
Pt experienced no falls during shift, Pt educated about dialysis, Pt verbalized understanding of dialysis and purpose of dialysis. Pt tolerated first dialysis tx, will continue to monitor

## 2020-01-13 NOTE — PLAN OF CARE
Pt free of falls/trauma/injuries. Pt had multiple run of Vtach through out shift (see note) other VSS. Pt continues in afib rhythm. C/O SOB with movement.  Pt did not have lasix drip restarted (see note) bolus of 120 mg given. Dobutamine continues to infuse at 2.5 mcg. Electrolytes replaced as ordered. R IJ intact with no complications observed. Wound care consulted for pt blisters to BLE. Pt remains with moderate edema to BLE. Pt tolerating plan of care. Will continue to monitor.

## 2020-01-14 LAB
ALBUMIN SERPL BCP-MCNC: 3.5 G/DL (ref 3.5–5.2)
ALP SERPL-CCNC: 192 U/L (ref 55–135)
ALT SERPL W/O P-5'-P-CCNC: 17 U/L (ref 10–44)
ANION GAP SERPL CALC-SCNC: 15 MMOL/L (ref 8–16)
AST SERPL-CCNC: 25 U/L (ref 10–40)
BASOPHILS # BLD AUTO: 0.02 K/UL (ref 0–0.2)
BASOPHILS NFR BLD: 0.2 % (ref 0–1.9)
BILIRUB SERPL-MCNC: 1.9 MG/DL (ref 0.1–1)
BUN SERPL-MCNC: 123 MG/DL (ref 6–20)
CALCIUM SERPL-MCNC: 10 MG/DL (ref 8.7–10.5)
CHLORIDE SERPL-SCNC: 91 MMOL/L (ref 95–110)
CO2 SERPL-SCNC: 34 MMOL/L (ref 23–29)
CREAT SERPL-MCNC: 2.7 MG/DL (ref 0.5–1.4)
DIFFERENTIAL METHOD: ABNORMAL
EOSINOPHIL # BLD AUTO: 0.2 K/UL (ref 0–0.5)
EOSINOPHIL NFR BLD: 1.9 % (ref 0–8)
ERYTHROCYTE [DISTWIDTH] IN BLOOD BY AUTOMATED COUNT: 21.8 % (ref 11.5–14.5)
EST. GFR  (AFRICAN AMERICAN): 29.8 ML/MIN/1.73 M^2
EST. GFR  (NON AFRICAN AMERICAN): 25.7 ML/MIN/1.73 M^2
GLUCOSE SERPL-MCNC: 131 MG/DL (ref 70–110)
HCT VFR BLD AUTO: 22.3 % (ref 40–54)
HGB BLD-MCNC: 6.4 G/DL (ref 14–18)
IMM GRANULOCYTES # BLD AUTO: 0.1 K/UL (ref 0–0.04)
IMM GRANULOCYTES NFR BLD AUTO: 1.1 % (ref 0–0.5)
INR PPP: 2 (ref 0.8–1.2)
LYMPHOCYTES # BLD AUTO: 0.8 K/UL (ref 1–4.8)
LYMPHOCYTES NFR BLD: 8.6 % (ref 18–48)
MAGNESIUM SERPL-MCNC: 2.6 MG/DL (ref 1.6–2.6)
MCH RBC QN AUTO: 25 PG (ref 27–31)
MCHC RBC AUTO-ENTMCNC: 28.7 G/DL (ref 32–36)
MCV RBC AUTO: 87 FL (ref 82–98)
MONOCYTES # BLD AUTO: 1.5 K/UL (ref 0.3–1)
MONOCYTES NFR BLD: 17.2 % (ref 4–15)
NEUTROPHILS # BLD AUTO: 6.2 K/UL (ref 1.8–7.7)
NEUTROPHILS NFR BLD: 71 % (ref 38–73)
NRBC BLD-RTO: 0 /100 WBC
PHOSPHATE SERPL-MCNC: 4 MG/DL (ref 2.7–4.5)
PLATELET # BLD AUTO: 287 K/UL (ref 150–350)
PMV BLD AUTO: 9.9 FL (ref 9.2–12.9)
POTASSIUM SERPL-SCNC: 3.5 MMOL/L (ref 3.5–5.1)
PROT SERPL-MCNC: 8.7 G/DL (ref 6–8.4)
PROTHROMBIN TIME: 19.1 SEC (ref 9–12.5)
RBC # BLD AUTO: 2.56 M/UL (ref 4.6–6.2)
SODIUM SERPL-SCNC: 140 MMOL/L (ref 136–145)
WBC # BLD AUTO: 8.8 K/UL (ref 3.9–12.7)

## 2020-01-14 PROCEDURE — 99232 PR SUBSEQUENT HOSPITAL CARE,LEVL II: ICD-10-PCS | Mod: ,,, | Performed by: NURSE PRACTITIONER

## 2020-01-14 PROCEDURE — 99232 PR SUBSEQUENT HOSPITAL CARE,LEVL II: ICD-10-PCS | Mod: ,,, | Performed by: INTERNAL MEDICINE

## 2020-01-14 PROCEDURE — 99233 PR SUBSEQUENT HOSPITAL CARE,LEVL III: ICD-10-PCS | Mod: ,,, | Performed by: HOSPITALIST

## 2020-01-14 PROCEDURE — 99232 SBSQ HOSP IP/OBS MODERATE 35: CPT | Mod: ,,, | Performed by: INTERNAL MEDICINE

## 2020-01-14 PROCEDURE — 25000003 PHARM REV CODE 250: Performed by: HOSPITALIST

## 2020-01-14 PROCEDURE — 36593 DECLOT VASCULAR DEVICE: CPT

## 2020-01-14 PROCEDURE — 84100 ASSAY OF PHOSPHORUS: CPT

## 2020-01-14 PROCEDURE — 99233 SBSQ HOSP IP/OBS HIGH 50: CPT | Mod: ,,, | Performed by: HOSPITALIST

## 2020-01-14 PROCEDURE — 20600001 HC STEP DOWN PRIVATE ROOM

## 2020-01-14 PROCEDURE — 80100014 HC HEMODIALYSIS 1:1

## 2020-01-14 PROCEDURE — 63600175 PHARM REV CODE 636 W HCPCS: Performed by: HOSPITALIST

## 2020-01-14 PROCEDURE — 83735 ASSAY OF MAGNESIUM: CPT

## 2020-01-14 PROCEDURE — 63600175 PHARM REV CODE 636 W HCPCS: Mod: JG | Performed by: INTERNAL MEDICINE

## 2020-01-14 PROCEDURE — 99232 SBSQ HOSP IP/OBS MODERATE 35: CPT | Mod: ,,, | Performed by: NURSE PRACTITIONER

## 2020-01-14 PROCEDURE — 85025 COMPLETE CBC W/AUTO DIFF WBC: CPT

## 2020-01-14 PROCEDURE — 85610 PROTHROMBIN TIME: CPT

## 2020-01-14 PROCEDURE — 80053 COMPREHEN METABOLIC PANEL: CPT

## 2020-01-14 PROCEDURE — 36415 COLL VENOUS BLD VENIPUNCTURE: CPT

## 2020-01-14 RX ORDER — SODIUM CHLORIDE 9 MG/ML
INJECTION, SOLUTION INTRAVENOUS ONCE
Status: CANCELLED | OUTPATIENT
Start: 2020-01-14 | End: 2020-01-14

## 2020-01-14 RX ORDER — FUROSEMIDE 10 MG/ML
120 INJECTION INTRAMUSCULAR; INTRAVENOUS ONCE
Status: COMPLETED | OUTPATIENT
Start: 2020-01-14 | End: 2020-01-14

## 2020-01-14 RX ORDER — SODIUM CHLORIDE 9 MG/ML
INJECTION, SOLUTION INTRAVENOUS
Status: CANCELLED | OUTPATIENT
Start: 2020-01-14

## 2020-01-14 RX ORDER — METOLAZONE 2.5 MG/1
10 TABLET ORAL DAILY
Status: DISCONTINUED | OUTPATIENT
Start: 2020-01-14 | End: 2020-01-15

## 2020-01-14 RX ADMIN — OXYCODONE HYDROCHLORIDE 15 MG: 10 TABLET ORAL at 01:01

## 2020-01-14 RX ADMIN — ASPIRIN 81 MG: 81 TABLET, COATED ORAL at 08:01

## 2020-01-14 RX ADMIN — METOLAZONE 10 MG: 2.5 TABLET ORAL at 10:01

## 2020-01-14 RX ADMIN — FUROSEMIDE 120 MG: 10 INJECTION, SOLUTION INTRAMUSCULAR; INTRAVENOUS at 10:01

## 2020-01-14 RX ADMIN — ALTEPLASE 4 MG: 2.2 INJECTION, POWDER, LYOPHILIZED, FOR SOLUTION INTRAVENOUS at 03:01

## 2020-01-14 RX ADMIN — POTASSIUM BICARBONATE 25 MEQ: 978 TABLET, EFFERVESCENT ORAL at 02:01

## 2020-01-14 RX ADMIN — FUROSEMIDE 40 MG/HR: 10 INJECTION, SOLUTION INTRAMUSCULAR; INTRAVENOUS at 03:01

## 2020-01-14 RX ADMIN — WARFARIN SODIUM 6 MG: 3 TABLET ORAL at 07:01

## 2020-01-14 RX ADMIN — POTASSIUM BICARBONATE 25 MEQ: 978 TABLET, EFFERVESCENT ORAL at 07:01

## 2020-01-14 RX ADMIN — HYDRALAZINE HYDROCHLORIDE AND ISOSORBIDE DINITRATE 1 TABLET: 37.5; 2 TABLET, FILM COATED ORAL at 08:01

## 2020-01-14 RX ADMIN — FUROSEMIDE 40 MG/HR: 10 INJECTION, SOLUTION INTRAMUSCULAR; INTRAVENOUS at 10:01

## 2020-01-14 RX ADMIN — FERROUS SULFATE TAB EC 325 MG (65 MG FE EQUIVALENT) 325 MG: 325 (65 FE) TABLET DELAYED RESPONSE at 08:01

## 2020-01-14 RX ADMIN — DOBUTAMINE IN DEXTROSE 2.5 MCG/KG/MIN: 200 INJECTION, SOLUTION INTRAVENOUS at 10:01

## 2020-01-14 RX ADMIN — FUROSEMIDE 40 MG/HR: 10 INJECTION, SOLUTION INTRAMUSCULAR; INTRAVENOUS at 08:01

## 2020-01-14 RX ADMIN — OXYCODONE HYDROCHLORIDE 15 MG: 10 TABLET ORAL at 10:01

## 2020-01-14 RX ADMIN — HYDRALAZINE HYDROCHLORIDE AND ISOSORBIDE DINITRATE 1 TABLET: 37.5; 2 TABLET, FILM COATED ORAL at 10:01

## 2020-01-14 NOTE — ASSESSMENT & PLAN NOTE
Plan/Recommendation:  - cont HD For volume removal   - Would recommend continued medical management   - Continue to monitor strict In/outs, daily weights  - Will continue to follow pending discussions between patient and palliative care/hospice

## 2020-01-14 NOTE — PLAN OF CARE
01/14/20 0825   Discharge Reassessment   Assessment Type Discharge Planning Reassessment   Do you have any problems affording any of your prescribed medications? TBD   Discharge Plan A Hospice/home   Discharge Plan B Home Health

## 2020-01-14 NOTE — PLAN OF CARE
gtt maintained per orders. Lasix gtt maintained; UOP measures closely. Hada few runs of vtach; MD aware. Plan is to continue diureses and volume removal. DC home with hospice when medially ready. VSS. Pt denies SOB or chest pain. Pt remain on telemetry; afib on telemetry. Fall precautions maintained. Pt free of falls and injuries. POC discussed with patient/family, verbalized understanding. Questions answered, no distress at present.

## 2020-01-14 NOTE — SUBJECTIVE & OBJECTIVE
Interval History: Patient has remained on  gtt at 2.5 mcg/kg since admission. He initially received several doses of IV Diuril but diuresis was limited. He responded well after Lasix gtt was increased to 30 mg/hr. Lasix gtt was stopped and patient was started on Lasix IVP on 1/3 but he began to worsen symptomatically. He received IV Diuril on 1/4 and was started back on Lasix gtt on 1/5. He has had recurrent epitaxis required 1 unit PRBC transfusion on 1/4 after Hgb dropped to 6.8. He again received Diuril on 1/6 and 1/7.    Patient agreed to temporary dialysis for volume removal and had a CVC placed on 1/11. He had his first HD session on 1/12. He was supposed to have HD again on 1/13 but line was clotted. TPA instilled in each port of HD catheter this morning. Planned for HD this afternoon.    Past Medical History:   Diagnosis Date    Anticoagulant long-term use     Cardiomegaly     Chronic combined systolic and diastolic congestive heart failure     Coronary artery disease     Heart attack 2006    Hypertension     Hyperthyroidism, subclinical 1/2/2013    MI (myocardial infarction) 9/22/2013    MI in 2009      Paroxysmal atrial fibrillation     PE (pulmonary embolism) 1/1/2013    IN 2010     S/P ablation of atrial flutter 2008    Stroke 2009    no residual weaknesses       Past Surgical History:   Procedure Laterality Date    COLONOSCOPY N/A 12/2/2019    Procedure: COLONOSCOPY;  Surgeon: Scott Rosario MD;  Location: [address]);  Service: Endoscopy;  Laterality: N/A;    ESOPHAGOGASTRODUODENOSCOPY N/A 12/2/2019    Procedure: EGD (ESOPHAGOGASTRODUODENOSCOPY);  Surgeon: Scott Rosario MD;  Location: [address]);  Service: Endoscopy;  Laterality: N/A;    RADIOFREQUENCY ABLATION  01/08/2008    for atrial flutter       Review of patient's allergies indicates:   Allergen Reactions    Acetaminophen      Itching    Oxycodone-acetaminophen      Other reaction(s): Itching    Ace  inhibitors Other (See Comments)     cough       Medications:  Continuous Infusions:   DOBUTamine 2.5 mcg/kg/min (01/13/20 2152)     Scheduled Meds:   aspirin  81 mg Oral Daily    ferrous sulfate  325 mg Oral Daily    isosorbide-hydrALAZINE 20-37.5 mg  1 tablet Oral BID    potassium bicarbonate  25 mEq Oral Q2H    warfarin  6 mg Oral Daily     PRN Meds:acetaminophen, Dextrose 10% Bolus, Dextrose 10% Bolus, diphenhydrAMINE, glucagon (human recombinant), glucose, glucose, melatonin, methocarbamol, methyl salicylate-menthol 15-10%, morphine, ondansetron, oxyCODONE, promethazine (PHENERGAN) IVPB, senna-docusate 8.6-50 mg, sodium chloride    Family History     Problem Relation (Age of Onset)    Alcohol abuse Father    Hypertension Mother, Father, Sister, Brother    Stroke Mother        Tobacco Use    Smoking status: Never Smoker    Smokeless tobacco: Never Used   Substance and Sexual Activity    Alcohol use: No    Drug use: No    Sexual activity: Never       Review of Systems   Constitutional: Negative for appetite change and fatigue.   HENT: Negative for sore throat and trouble swallowing.    Respiratory: Positive for shortness of breath. Negative for cough and wheezing.    Cardiovascular: Positive for leg swelling. Negative for chest pain.   Gastrointestinal: Positive for constipation and nausea. Negative for diarrhea and vomiting.   Genitourinary: Negative for dysuria and hematuria.   Musculoskeletal: Positive for arthralgias (hips) and back pain. Negative for neck pain.   Skin: Positive for wound (RLE). Negative for rash.   Neurological: Negative for dizziness and light-headedness.   Psychiatric/Behavioral: Negative for confusion and dysphoric mood. The patient is not nervous/anxious.      Objective:     Vital Signs (Most Recent):  Temp: 99 °F (37.2 °C) (01/14/20 1133)  Pulse: (!) 144 (01/14/20 1133)  Resp: 17 (01/14/20 1133)  BP: 139/77 (01/14/20 1133)  SpO2: 95 % (01/14/20 1133) Vital Signs (24h  Range):  Temp:  [97 °F (36.1 °C)-99 °F (37.2 °C)] 99 °F (37.2 °C)  Pulse:  [] 144  Resp:  [16-18] 17  SpO2:  [92 %-96 %] 95 %  BP: (119-139)/(60-77) 139/77     Weight: 123.2 kg (271 lb 9.7 oz)  Body mass index is 40.11 kg/m².    Review of Symptoms  Symptom Assessment (ESAS 0-10 scale)   ESAS 0 1 2 3 4 5 6 7 8 9 10   Pain X             Dyspnea  X            Anxiety X             Nausea X             Depression  X             Anorexia X             Fatigue X             Insomnia X             Restlessness  X             Agitation X             CAM / Delirium _X_ --  ___+   Constipation     __ --  _X_+   Diarrhea           _X_ --  ___+  Bowel Management Plan (BMP): Yes    Comments: Senna-Docusate ordered BID PRN    Pain Assessment: denies pain at this time; reports intermittent shooting pain to bilateral hips, legs and back that worsens with movement or activity and improves with Oxycodone      OME in 24 hours: 30    Performance Status: 50    Physical Exam   Constitutional: He is oriented to person, place, and time. He appears well-developed and well-nourished.   On  gtt   HENT:   Head: Normocephalic and atraumatic.   Eyes: Conjunctivae and EOM are normal.   Neck: Normal range of motion. Neck supple.   Cardiovascular: Regular rhythm. Tachycardia present.   Pulmonary/Chest: Effort normal. No respiratory distress.   Abdominal: Soft. There is no tenderness.   Musculoskeletal: Normal range of motion. He exhibits edema.   Neurological: He is alert and oriented to person, place, and time.   Skin: Skin is warm and dry.   Dressing to RLE ulceration   Psychiatric: His behavior is normal. Thought content normal. Cognition and memory are normal.   Vitals reviewed.      Significant Labs: All pertinent labs within the past 24 hours have been reviewed.  CBC:   Recent Labs   Lab 01/14/20 0347   WBC 8.80   HGB 6.4*   HCT 22.3*   MCV 87        BMP:  Recent Labs   Lab 01/14/20 0347   *      K 3.5   CL 91*    CO2 34*   *   CREATININE 2.7*   CALCIUM 10.0   MG 2.6     LFT:  Lab Results   Component Value Date    AST 25 2020     (H) 2019    ALKPHOS 192 (H) 2020    BILITOT 1.9 (H) 2020     Albumin:   Albumin   Date Value Ref Range Status   2020 3.5 3.5 - 5.2 g/dL Final     Protein:   Total Protein   Date Value Ref Range Status   2020 8.7 (H) 6.0 - 8.4 g/dL Final     Lactic acid:   Lab Results   Component Value Date    LACTATE 1.0 2019    LACTATE 1.0 2019       Significant Imaging: I have reviewed all pertinent imaging results/findings within the past 24 hours.    Advance Care Planning   Advanced Directives::  Living Will: No  LaPOST: No  Do Not Resuscitate Status: No  Medical Power of : No. Patient identifies brother Kris [place] ([phone]) as his surrogate decision maker.    Decision-Making Capacity: Patient answered questions       Living Arrangements: Lives alone    Psychosocial/Cultural:  Patient is unmarried and has no children. His parents are . He has 5 siblings and a lot of cousins and friends. He lives alone in [place] but says his brother Kris lives right next to him. 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 He says that his goal is to get healthy. He worries about dying. He does not like to talk about the future and prefers to take things day by day.    Spiritual:     F- Melina and Belief: does not identify with a particular Shinto but prays to God    I - Importance: somewhat important  .  C - Community: does not belong to a Quaker    A - Address in Care:  visits

## 2020-01-14 NOTE — PROGRESS NOTES
[facility]  Nephrology  Progress Note    Patient Name: Hamlet Terrell  MRN: [id number]  Admission Date: 12/24/2019  Hospital Length of Stay: 21 days  Attending Provider: Antoinette Goins MD   Primary Care Physician: Carlin Swift MD  Principal Problem:Acute on chronic combined systolic and diastolic congestive heart failure    Subjective:     HPI: The pt is a 53 yo M with chronic combined HF (EF 25% on 11/27), atrial fibrillation on chronic AC (coumadin), CAD, CVA, and history of PE that presented to the ED with SOB & edema. Nephrology was consulted for assistance with management of cardiorenal syndrome in the setting of JEAN-PIERRE on CKD III now resistive to diuretic management. Baseline Cr appears to be 1.6-1.9. Cr elevated to 3.1 at time of consult. Of note, he was recently admitted to Hospital Medicine from 11/24-12/8 for a CHF exacerbation.  During that admission, he was evaluated by Cardiology who started him on Dobutamine and Diuril.  He was DC'ed home on Lasix 80 mg po BID.  He reports that he was compliant with this regimen, but despite this, he had continued to gain weight, have lower extremity and abdominal swelling, as well as shortness of breath. He was discharged with a weight of 259lbs, and claims that is around his dry weight. He is currently 283 lbs.       Per Dr. Goins:   Patient started on IV diuretics and  infusion for diagnosis of acute on chronic combined heart failure. His weight was stagnant and UOP tapered, so he was transitioned to IVP lasix for IV lasix gtt as this was actually effective during prior admission under guidance from co-management cardiology. However, the patient actually worsened symptomatically and TBili increased along with Cr and weight increase. The patient is now started back on lasix infusion with diuril augmentation. The patient's course is also c/b epistaxis, possibly related to his decompensated picture. He has required pRBC transfusion. He refuses to  apply pressure to the bridge of his nose as he says he cannot breathe through his mouth.  The patient's course is also c/b fever, likely due to pRBC transfusion.     Cardiology consulted for diuretic resistance. Despite two-week hospital stay thus far, he is only down 3 lbs. Nephrology consulted for acute renal failure and consideration of temporary HD for volume removal. Pt continues to exhibit poor insight into his disease. His wishes (to get healthy, ride his bike, walk to the store, return to the hospital if needed) is not on par with hospice. He reported similar feelings to Palliative Care yesterday. Discussed with Palliative Care provider. He is however now amenable for home dobutamine. PICC placement will likely be an issue given his current renal failure. Hb 6.9 secondary to renal failure; pt would like to hold off on transfusion today.    Interval History: Patient seen and examined at bedside, no acute events overnight. scheduled for second day HD today. Third session tomorrow.     Review of patient's allergies indicates:   Allergen Reactions    Acetaminophen      Itching    Oxycodone-acetaminophen      Other reaction(s): Itching    Ace inhibitors Other (See Comments)     cough     Current Facility-Administered Medications   Medication Frequency    acetaminophen tablet 650 mg Q4H PRN    aspirin EC tablet 81 mg Daily    dextrose 10% (D10W) Bolus PRN    dextrose 10% (D10W) Bolus PRN    diphenhydrAMINE capsule 25 mg Q6H PRN    DOBUTamine 500mg in D5W 250mL infusion (premix) (NON-TITRATING) Continuous    ferrous sulfate EC tablet 325 mg Daily    glucagon (human recombinant) injection 1 mg PRN    glucose chewable tablet 16 g PRN    glucose chewable tablet 24 g PRN    isosorbide-hydrALAZINE 20-37.5 mg per tablet 1 tablet BID    melatonin tablet 6 mg Nightly PRN    methocarbamol tablet 500 mg TID PRN    methyl salicylate-menthol 15-10% cream TID PRN    morphine injection 4 mg Q6H PRN     ondansetron injection 8 mg Q8H PRN    oxyCODONE immediate release tablet 15 mg Q4H PRN    potassium bicarbonate disintegrating tablet 25 mEq Q2H    promethazine (PHENERGAN) 25 mg in dextrose 5 % 50 mL IVPB Q6H PRN    senna-docusate 8.6-50 mg per tablet 1 tablet BID PRN    sodium chloride 0.65 % nasal spray 1 spray PRN    warfarin (COUMADIN) tablet 6 mg Daily       Objective:     Vital Signs (Most Recent):  Temp: 97.7 °F (36.5 °C) (01/14/20 1628)  Pulse: 87 (01/14/20 1628)  Resp: 17 (01/14/20 1628)  BP: (!) 130/3 (01/14/20 1700)  SpO2: (!) 94 % (01/14/20 1700)  O2 Device (Oxygen Therapy): room air (01/13/20 1445) Vital Signs (24h Range):  Temp:  [97 °F (36.1 °C)-99 °F (37.2 °C)] 97.7 °F (36.5 °C)  Pulse:  [] 87  Resp:  [16-18] 17  SpO2:  [89 %-96 %] 94 %  BP: (119-152)/(3-77) 130/3     Weight: 123.2 kg (271 lb 9.7 oz) (01/14/20 0600)  Body mass index is 40.11 kg/m².  Body surface area is 2.45 meters squared.    I/O last 3 completed shifts:  In: 845 [P.O.:845]  Out: 2475 [Urine:2475]    Physical Exam   Constitutional: He is oriented to person, place, and time. No distress.   Sick appearing male   HENT:   Head: Normocephalic and atraumatic.   Right Ear: External ear normal.   Left Ear: External ear normal.   Nose: Nose normal.   Eyes: Pupils are equal, round, and reactive to light. EOM are normal. No scleral icterus.   Neck: JVD present. No tracheal deviation present.   Cardiovascular: Normal rate, regular rhythm and intact distal pulses. Exam reveals gallop.   Pulses:       Radial pulses are 2+ on the right side, and 2+ on the left side.   Pulmonary/Chest: Effort normal. No stridor. He has no wheezes. He has rales (bibasilar).   Abdominal: Soft. There is no tenderness. There is no guarding.   Musculoskeletal: He exhibits edema and tenderness.   Neurological: He is alert and oriented to person, place, and time.   asterixis   Skin: Skin is warm and dry. Rash (chronic venous stasis ulceration to the lower  extremities bilaterally) noted. He is not diaphoretic.   Psychiatric: He has a normal mood and affect. His behavior is normal.   Nursing note and vitals reviewed.      Significant Labs:  CBC:   Recent Labs   Lab 01/14/20  0347   WBC 8.80   RBC 2.56*   HGB 6.4*   HCT 22.3*      MCV 87   MCH 25.0*   MCHC 28.7*     CMP:   Recent Labs   Lab 01/14/20  0347   *   CALCIUM 10.0   ALBUMIN 3.5   PROT 8.7*      K 3.5   CO2 34*   CL 91*   *   CREATININE 2.7*   ALKPHOS 192*   ALT 17   AST 25   BILITOT 1.9*     All labs within the past 24 hours have been reviewed.     Significant Imaging:  Labs: Reviewed    Assessment/Plan:     * Acute on chronic combined systolic and diastolic congestive heart failure  27 yo male with HFrEF (25%), CKD 3, and multiple hospital admissions for heart failure exacerbations presenting this admission for acute on chronic heart failure. Despite aggressive diuresis with Lasix 120mg TID, decreased urinary output. He was later transitioned to lasix gtt at 30 mg/hr with multiple doses of diuril without improvement in UPO. Nephrology consult for volume overload refractory to diuresis.    Plan/Recommendation:  - on dobutamine gtt as per cardiology            Cardiorenal syndrome  Plan/Recommendation:  - cont HD For volume removal   - Would recommend continued medical management   - Continue to monitor strict In/outs, daily weights  - Will continue to follow pending discussions between patient and palliative care/hospice            Stage 3 chronic kidney disease  Mr. Terrell is a 53 y/o male with HFrEF (last 2D ECHO EF 23%, moderate MR, moderate TR, diastolic dysfunction), hypertension, chronic atrial fibrillation, a flutter status post ablation, CVA in 2009, coronary artery disease, CKD III, and obstructive sleep apnea presents with ADCHF. Nephro consulted for JEAN-PIERRE on CKD in the setting of Cardiorenal syndrome.   - JEAN-PIERRE was thought to be to be multifactorial from labile BP, and  component of cardiorenal syndrome  - Creatinine up trending since admission, baseline 1.6-1.9, currently 3.0   - Azotemia worsening daily. No uremic symptoms present.     Plan/Rec  - 2nd HD session today for 2 hours with F6 dialyzer for metabolic exchange, third session tomorrow in am   - recommend holding diuretics at this time  - Strict I/O and chart  - Avoid nephrotoxic medications, NSAIDs, IV contrast, etc.  - Daily weights and chart  - Medication doses adjusted to GFR  - maintaining good urine output at 1.4 liters over last 24 hours  - Will follow closely        Thank you for your consult. I will follow-up with patient. Please contact us if you have any additional questions.    Pankaj Finley MD  Nephrology  [facility]

## 2020-01-14 NOTE — PROGRESS NOTES
Progress Note  Hospital Medicine    Provider team:    [facility] [place], [place] HOSP MED C  [facility] [place] [place] NEPHROLOGY CONSULT SERVICE  Admit Date: 12/24/2019  Encounter Date: 01/14/2020     SUBJECTIVE:     Follow-up Visit for: Acute on chronic combined systolic and diastolic congestive heart failure    HPI (See H&P for complete P,F,SHx):  54M with chronic combined systolic and diastolic heart failure (EF 25%), afib on Coumadin, CAD, CVA, and history of PE who presents to the ED for evaluation of shortness of breath and edema.  He was just admitted to Hospital Medicine from 11/24-12/8 for a CHF exacerbation.  During that admission, he was evaluated by Cardiology who started him on Dobutamine and Diuril.  He was resistant to HTS evaluation during that stay, which included recommendations for LVAD, transplant, and ICD.  He was discharged home on Lasix 80mg PO BID.  He endorses compliance with his diuretic regimen, but despite this and following a low salt diet, he has continued to gain weight, have lower extremity and abdominal swelling, as well as shortness of breath.  He is so short of breath that he has to sleep in a chair, and has been unable to sleep because of his breathing.  He denies any chest pain.  He does have a cough that is mostly dry, but occasionally productive of blood tinged sputum.  He was discharged weight a weight of 259lbs, and claims that is around his dry weight.  He is currently 286lbs.       Upon arrival to the ED, vitals were /83, HR 88, RR 22.  Labs showed Hemoglobin 7.3, INR 1.5, Creatinine 1.8, Alk Phos 278,  with Trop 0.128. CXR showed cardiomegaly and pulmonary edema, along with a masslike opacity. CT chest was attempted to be ordered to evaluate the mass, but he was unable to lie flat to get it evaluated.  He was given Lasix 80mg IV and was admitted to Hospital Medicine for further management.    TTE 11/27/2019  · Severely decreased LVSF.  · The estimated ejection fraction is 25%  · Concentric left  ventricular hypertrophy.  · Global hypokinetic wall motion.  · Moderate right ventricular enlargement.  · Moderately reduced RVSF.  · Severe biatrial enlargement.  · Moderate mitral regurgitation.  · Moderate tricuspid regurgitation.  · The estimated PA systolic pressure is 40 mm Hg  · Elevated central venous pressure (15 mm Hg).  · Atrial fibrillation observed.    Hospital course:  Patient started on IV diuretics and  infusion for diagnosis of acute on chronic combined heart failure. His weight was stagnant and UOP tapered, so he was transitioned to IVP lasix for IV lasix gtt as this was actually effective during prior admission under guidance from co-management cardiology. However, the patient actually worsened symptomatically and TBili increased along with Cr and weight increase. The patient is now started back on lasix infusion with diuril augmentation. The patient's course was c/b epistaxis, possibly related to his decompensated picture. He required pRBC transfusion. Nephrology has initiated temporary dialysis for the patient.    Interval history:  Did not receive HD yesterday due to clotted line. Nephrology planning on HD this afternoon. Pt only net negative 700 mL yesterday and is up 1 lb today. Will continue Lasix gtt/metolazone this evening after dialysis to achieve net negative 2-3 liters/day. Pt declining blood transfusion today.     Wt Readings from Last 30 Encounters:   01/14/20 123.2 kg (271 lb 9.7 oz)   12/07/19 117.6 kg (259 lb 4.2 oz)   10/23/19 108.9 kg (240 lb)   09/09/19 108.9 kg (240 lb)   08/04/19 105.2 kg (232 lb)   07/18/19 108.2 kg (238 lb 8.6 oz)   06/20/19 118.9 kg (262 lb 2 oz)   05/21/19 121.1 kg (267 lb)   05/16/19 116.3 kg (256 lb 6.3 oz)   04/20/19 127.5 kg (281 lb 1.4 oz)   01/31/19 113.4 kg (250 lb)   11/04/18 123.1 kg (271 lb 6.2 oz)   08/31/18 119.6 kg (263 lb 10.7 oz)   07/28/18 113.4 kg (250 lb)   07/10/18 120.7 kg (266 lb)   05/03/18 117.9 kg (259 lb 14.8 oz)   03/30/18  117.9 kg (260 lb)   02/21/18 117.9 kg (260 lb)   01/12/18 113.4 kg (250 lb)   12/10/17 117.9 kg (260 lb)   11/11/17 113.9 kg (251 lb)   11/01/17 111.8 kg (246 lb 7.6 oz)   10/26/17 118.5 kg (261 lb 3.2 oz)   08/13/17 117.9 kg (260 lb)   08/12/17 117.9 kg (260 lb)   05/10/17 117.9 kg (260 lb)   04/05/17 108.4 kg (239 lb 1.4 oz)   02/01/17 108.9 kg (240 lb)   12/15/16 119.4 kg (263 lb 3.7 oz)   08/26/16 112.5 kg (248 lb)     Review of Systems:  Review of Systems   Constitutional: Negative for chills and fever.   HENT: Negative for congestion and sore throat.    Eyes: Negative for photophobia, pain and discharge.   Respiratory: Positive for shortness of breath. Negative for cough, hemoptysis and sputum production.    Cardiovascular: Positive for orthopnea, leg swelling and PND. Negative for chest pain and palpitations.   Gastrointestinal: Negative for abdominal pain, diarrhea, nausea and vomiting.   Genitourinary: Negative for dysuria and urgency.   Musculoskeletal: Negative for myalgias and neck pain.   Skin: Negative for itching and rash.   Neurological: Negative for sensory change, focal weakness and headaches.   Endo/Heme/Allergies: Negative for polydipsia. Does not bruise/bleed easily.   Psychiatric/Behavioral: Negative for depression and suicidal ideas.     Past Medical History:   Diagnosis Date    Anticoagulant long-term use     Cardiomegaly     Chronic combined systolic and diastolic congestive heart failure     Coronary artery disease     Heart attack 2006    Hypertension     Hyperthyroidism, subclinical 1/2/2013    MI (myocardial infarction) 9/22/2013    MI in 2009      Paroxysmal atrial fibrillation     PE (pulmonary embolism) 1/1/2013    IN 2010     S/P ablation of atrial flutter 2008    Stroke 2009    no residual weaknesses     Social History     Socioeconomic History    Marital status: Single     Spouse name: Not on file    Number of children: Not on file    Years of education: Not on file  "   Highest education level: Not on file   Occupational History    Not on file   Social Needs    Financial resource strain: Not on file    Food insecurity:     Worry: Not on file     Inability: Not on file    Transportation needs:     Medical: Not on file     Non-medical: Not on file   Tobacco Use    Smoking status: Never Smoker    Smokeless tobacco: Never Used   Substance and Sexual Activity    Alcohol use: No    Drug use: No    Sexual activity: Never   Lifestyle    Physical activity:     Days per week: Not on file     Minutes per session: Not on file    Stress: Not on file   Relationships    Social connections:     Talks on phone: Not on file     Gets together: Not on file     Attends Confucianist service: Not on file     Active member of club or organization: Not on file     Attends meetings of clubs or organizations: Not on file     Relationship status: Not on file   Other Topics Concern    Not on file   Social History Narrative    Not on file       OBJECTIVE:       Intake/Output Summary (Last 24 hours) at 1/14/2020 1256  Last data filed at 1/14/2020 0850  Gross per 24 hour   Intake 840 ml   Output 1925 ml   Net -1085 ml     Vital Signs Range (Last 24H):  Temp:  [97 °F (36.1 °C)-99 °F (37.2 °C)]   Pulse:  []   Resp:  [16-18]   BP: (119-139)/(60-77)   SpO2:  [92 %-96 %]   Body mass index is 40.11 kg/m².    Objective:  General Appearance:  Comfortable, well-appearing, in no acute distress and not in pain.    Vital signs: (most recent): Blood pressure 139/77, pulse (!) 144, temperature 99 °F (37.2 °C), temperature source Oral, resp. rate 17, height 5' 9" (1.753 m), weight 123.2 kg (271 lb 9.7 oz), SpO2 95 %.  No fever.    Output: Producing urine and producing stool.    HEENT: Normal HEENT exam.  (+JVD)    Lungs:  Normal effort.  Breath sounds clear to auscultation.  He is not in respiratory distress.  There are rales.  No wheezes.    Heart: Normal rate.  Regular rhythm.  S1 normal and S2 normal.  " Positive for murmur.    Chest: Symmetric chest wall expansion.   Abdomen: Abdomen is soft.  Bowel sounds are normal.   There is no abdominal tenderness.     Extremities: Normal range of motion.  There is venous stasis and dependent edema.  There is no deformity, effusion or local swelling.    Pulses: Distal pulses are intact.    Neurological: Patient is alert and oriented to person, place and time.  Normal strength.    Pupils:  Pupils are equal, round, and reactive to light.    Skin:  Warm and dry.      Medications:  Medication list was reviewed in EPIC and changes noted under Assessment/Plan and MAR.    Laboratory:  Recent Labs     01/14/20  0347   WBC 8.80   RBC 2.56*   HGB 6.4*   HCT 22.3*      MCV 87   MCH 25.0*   MCHC 28.7*   GRAN 71.0  6.2   LYMPH 8.6*  0.8*   MONO 17.2*  1.5*   EOS 0.2      Recent Labs     01/14/20 0347   *      K 3.5   CL 91*   CO2 34*   *   CREATININE 2.7*   CALCIUM 10.0   ANIONGAP 15   MG 2.6   PHOS 4.0     Prior records reviewed.    ECHO reviewed:  · Severely decreased left ventricular systolic function. The estimated ejection fraction is 25%  · Concentric left ventricular hypertrophy.  · Global hypokinetic wall motion.  · Moderate right ventricular enlargement.  · Moderately reduced right ventricular systolic function.  · Severe biatrial enlargement.  · Moderate mitral regurgitation.  · Moderate tricuspid regurgitation.  · The estimated PA systolic pressure is 40 mm Hg  · Elevated central venous pressure (15 mm Hg).  · Atrial fibrillation observed.      Imaging reviewed  Imaging Results          CT Chest Without Contrast (No Result on File)                X-Ray Chest AP Portable (Final result)  Result time 12/24/19 19:09:07    Final result by Zeb Hirsch MD (12/24/19 19:09:07)                 Impression:      Masslike opacity overlying the right lower lung zone, similar to prior exam.  Right pleural fluid not excluded.  Chest CT recommended for further  evaluation of right lung masslike opacity.    Patchy bilateral pulmonary opacities, similar to prior exam.    Cardiomegaly.    Electronically signed by resident: Cristofer Reddy  Date:    12/24/2019  Time:    18:48    Electronically signed by: Zeb Hirsch MD  Date:    12/24/2019  Time:    19:09             Narrative:    EXAMINATION:  XR CHEST AP PORTABLE    CLINICAL HISTORY:  CHF;    TECHNIQUE:  Single frontal view of the chest was performed.    COMPARISON:  Chest radiograph 12/03/2019    FINDINGS:  Cardiac leads overlie the chest wall.    Redemonstration of extensive bilateral patchy opacities, more prominent on the right with a focal area of masslike opacity in the right lower lung zone.  No significant change from prior exam.  Left costophrenic angle is visible and right sided pleural fluid not excluded noting presence of opacification.  No pneumothorax.    The cardiac silhouette is enlarged.  Hilar and mediastinal contours are unchanged.    Bones are intact.                                  ASSESSMENT/PLAN:     Active Hospital Problems    Diagnosis  POA    *Acute on chronic combined systolic and diastolic congestive heart failure [I50.43]  Yes    Open wound of right lower leg [S81.801A]  Yes    Cardiorenal syndrome [I13.10]  Unknown    Palliative care encounter [Z51.5]  Not Applicable    Counseling regarding advanced care planning and goals of care [Z71.89]  Not Applicable    Benign hypertensive heart and kidney disease with HF and CKD [I13.0]  Yes    Elevated alkaline phosphatase level [R74.8]  Yes    Coronary artery disease [I25.10]  Yes     Chronic    Stage 3 chronic kidney disease [N18.3]  Yes    Subtherapeutic international normalized ratio (INR) [R79.1]  Yes    Personal history of cerebral embolism [Z86.79]  Not Applicable    Personal history of venous thrombosis and embolism [Z86.718]  Not Applicable    Personal history of MI (myocardial infarction) [I25.2]  Not Applicable    Essential  hypertension [I10]  Yes     Chronic    Atrial fibrillation, chronic [I48.20]  Yes     Chronic    Chronic anticoagulation [Z79.01]  Not Applicable     Chronic    Cardiomyopathy [I42.9]  Yes     Chronic      Resolved Hospital Problems   No resolved problems to display.      Acute on Chronic Combined Systolic and Diastolic Heart Failure  Cardiomyopathy  · CHF Pathway initiated  · Last 2D echo Nov 2019 with EF 25%  ·  and CXR with pulmonary edema and cardiomegaly  · Started on Lasix gtt, increased to 40 mg/hr. Failed transition to high dose IV Lasix pushes. Subsequently started back on Lasix gtt  · Changed Diuril to BID Metolazone 10 mg per Nephro recs  · Cont  gtt 2.5, which will be continued at home. Will need long-term IV placement when he nears discharge. PICC placement may be an issue given his renal failure   · Cardiology Co-mgmt consulted. Recommend Nephro consult for temporary HD for volume removal due to diuretic resistance.   · Goal diuresis 2-3L/day.  Home diuretic dose:  Lasix 80mg PO BID  · BID BMP (with morning labs and at 4pm)  · Strict I&Os with daily weights.  · Hold BB, okay to c/w BiDil  · Continue diuresis (Lasix gtt 40 mg/hr + metolazone 10 mg daily or twice daily) when not receiving dialysis for goal net negative 3 liters a day     JEAN-PIERRE on CKD3  · Baseline 1.8  · Cardiorenal   · BUN has been rising  · S/p HD session on 1/12. Continue diuresis/dialysis     Chronic AFib  Long Term Use of Anticoagulants  Supratherapeutic INR  · Chronic issue  · HR in 100-120's  · INR initially subtherapeutic. Dose increased to 7.5mg. Then supratherapeutic so held coumadin. INR now therapeutic, continue 6 mg daily. Pharmacy consulted for dosing management     Essential HTN  · Chronic and stable  · Continue Bidil BID     CAD  History of MI  · Chronic and stable  · Continue Aspirin 81mg PO daily  · Continue Bidil BID     Anemia  · Received 5 units PRBCs on prior hospital admission  · Continue PO iron  · Type  and screen obtained  · Monitor for bleeding/need for transfusion  · Transfused 1U pRBC after episode of epistaxis noted. Refusing further transfusions  · Suspect dilutional at this time     History of VTE  History of Stroke  · Chronic and stable  · Continue Aspirin 81mg PO daily  · Continue Warfarin when INR appropriate      Elevated Alk Phos  · Chronic and stable  · Monitor     Benign Hypertensive Heart and Kidney Disease with HF and CKD  · As above     Abnormal CT  · Needs CT chest when able to lie flat or outpt to evaluate masslike opacity seen on CXR     Diet:  Low Sodium, 1 L fluid restriction  VTE PPx:  Warfarin  Goals of Care:  Full     High Risk Conditions:   Patient is currently on drug therapy requiring intensive monitoring for toxicity: Lasix gtt    Anticipated discharge date and disposition:   Pending volume removal/diuresis. Home hospice on discharge. Pt w/ very poor insight into disease process and will likely continue to be readmitted to the hospital    Antoinette Goins MD  [facility]

## 2020-01-14 NOTE — PROGRESS NOTES
TPA 2mg dwelt in each ports of hd catheter. DO NOT REMOVE LABEL attached - For HD use only.    Medication signed on MAR.    See flow sheet

## 2020-01-14 NOTE — ASSESSMENT & PLAN NOTE
"Palliative care consulted for goals of care discussion/advanced care planning for this 55 y/o male being followed by hospital medicine for Acute on Chronic Combined Systolic and Diastolic Heart Failure, Cardiomyopathy, JEAN-PIERRE on CKD3, Chronic AFib, Essential HTN, CAD, Anemia, Elevated Alk Phos, Abnormal CT, History of MI, History of VTE and History of Stroke. Rounded on patient today, no family members at bedside. Patient is alert and oriented but not very talkative and slow to respond to questions. He was supposed to have his second dialysis treatment on  but line was clotted. TPA instilled in each port of HD catheter this morning. Planned for HD this afternoon.    Plan/Recommendations:  1. See goals of care discussion below.  2. Will assist with completion of advanced directives packet when patient is ready; copy at bedside.  3. Palliative care will follow up with patient.    Goals of Care/Advance Care Plannin/14 Rounded on patient today, no family members at bedside. Patient is alert and oriented but not very talkative and slow to respond to questions. He is agreeable to another dialysis session this afternoon but continues to say that he would not want HD long term. Patient's goal is still to "get healthy," again explained that his condition is not curable but symptoms can be managed with medications. He continues to have poor insight into his condition and is not receptive to discussions regarding prognosis. Tried to discuss what the future may look like but patient says he is not thinking about the future and wants to take things day by day. He is aware that home hospice is available to him and would allow him to stay out of the hospital after discharge. Patient has advanced directives packet at bedside but has not yet looked at it, encouraged him to read through it tonight. Will continue to follow up with patient.     Rounded on patient today, no family members at bedside. Patient is alert, " "oriented, and cooperative with visit. He has been started on temporary dialysis for volume removal with first dialysis session on 1/12. He says that dialysis "wasn't too bad" but he still would not want to continue dialysis long term. He has a poor understanding of his prognosis and his goal is "to get healthy, no matter what." Discussed with patient that interventions being performed are for control of his symptoms and that his condition is not curable. He is still considering discharging home with hospice but has not made a firm decision. He remains full code. Advanced directives packet given to patient for review, encouraged him to at least consider completing MPOA and/or living will. Will follow up regarding completion of advanced directives.    1/9 Rounded on patient today, no family members at bedside. Patient is alert, oriented, and receptive to visit. He expresses appreciation for palliative care and  visits and says, "the more you all come, the better I feel." He has not made any firm decisions about plans after discharge but is leaning towards enrollment with [facility]. He likes the idea of having his symptoms managed at home so he does not have to keep coming back to the hospital. [facility]  Hospice will accept the patient on home IV Dobutamine and/or Lasix if needed. He is full code and he is not ready to consider changing code status to DNR, although change in code status is not required for hospice enrollment. He has poor understanding and insight related to his clinical condition and prognosis; he continues to think that he can prove everyone wrong. He remains unsure about whether he wants to proceed with short term dialysis; he is clear that he would never want long term dialysis even if refusal would result in his death.    1/7 Rounded on patient today with BERTA Granado LCSW, no family members at bedside. Patient is alert, oriented, and cooperative with visit but irritable at times. Patient " "met with [facility] reps last week and they stopped by today to see him. He remains undecided about hospice but understands that they can help keep him out the hospital after discharge if that is what he wants. He expresses frustration with being in the hospital but also says that he would be willing to come back to the hospital if needed. He does not like the idea of going home on Dobutamine drip but now says that he would be willing as long as he does not have to roll an IV pole around. He says he will carry a "purse" if needed. Nephrology rounded on patient today and discussed possible need for dialysis. Patient is willing to consider trying dialysis for a "week or so" but is adamantly opposed to long-term dialysis. He is familiar with dialysis because his brother is a dialysis patient and says that he can't sit still for that long. He states that he would never want long-term dialysis even if he would die without it. At this time he remains full code.    1/6 Rounded on patient today, no family members at bedside. Patient is alert, oriented, and cooperative with visit. He expresses frustration with being in the hospital and has been intermittently refusing IV sticks, lab draws, and vital signs. Reminded patient that he has the option to enroll in hospice if he prefers to discontinue aggressive treatment and focus on comfort. Patient met with [facility] rep last week but has not yet made any decisions; he says that he is still thinking about it. Discussed the plan for patient to discharge on  drip but patient now says that he does not want to be on a drip when he gets discharged because he is not "toting no bag around." Patient has been told by primary team that he will likely need  drip for the rest of his life but he states that he is going to "prove them wrong." He says that he was told at age 42 that he would die without a defibrillator and he is still here so he has "proved them wrong" " "before. He also says that he would like to go to the rehab hospital down the street when he leaves here so he can get stronger. His goal remains to "get healthy" and he wishes to remain full code. He has poor understanding and insight related to his clinical condition and prognosis. Will involve palliative care  for assistance with emotional support and coping skills.    1/3 Conference conducted by this APRN with patient to discuss his current clinical status, goals of care, treatment options, code status, long term expected outcomes and prognostic awareness. After a discussion concerning his values and wishes, patient verbalized limited understanding and insight related to his clinical condition and prognostic awareness. His stated goal is to "get healthy." He has been told that he will need to be on  gtt for the rest of his life but still hopes that he can live a long time. He has never thought about his end of life preferences and whether he would prefer to die at home or at the hospital. Discussed home hospice as an option that would allow him to receive care in the home and provide management of symptoms. Patient is not ready to enroll in hospice at this time but is interested in meeting with a hospice rep to get more information on their services.    Patient has never completed a living will or MPOA. His preference for surrogate decision maker would be his brother Kris [place]. Explained to patient that palliative care can assist with completion of MPOA paperwork whenever he is ready. He is currently a full code and not ready to make any changes to his code status.  "

## 2020-01-14 NOTE — PROGRESS NOTES
Arrived to pt room 355. Dialysis  at bedside, started dialysis via Right neck catheter.  Blood flow slightly sluggish to flush.  Only able to achieve BFR of 180 at start of treatment.  Will attempt to get BFR of 250 as ordered.  Pt tolerated without difficulty.

## 2020-01-14 NOTE — PROGRESS NOTES
"[facility]  Palliative Medicine  Progress Note    Patient Name: Hamlet Terrell  MRN: [id number]  Admission Date: 2019  Hospital Length of Stay: 21 days  Code Status: Full Code   Attending Provider: Antoinette Goins MD  Consulting Provider: Vira Davila NP  Primary Care Physician: Carlin Swift MD  Principal Problem:Acute on chronic combined systolic and diastolic congestive heart failure    Patient information was obtained from patient, past medical records and Dr. Goins.      Assessment/Plan:     Palliative care encounter  Palliative care consulted for goals of care discussion/advanced care planning for this 53 y/o male being followed by hospital medicine for Acute on Chronic Combined Systolic and Diastolic Heart Failure, Cardiomyopathy, JEAN-PIERRE on CKD3, Chronic AFib, Essential HTN, CAD, Anemia, Elevated Alk Phos, Abnormal CT, History of MI, History of VTE and History of Stroke. Rounded on patient today, no family members at bedside. Patient is alert, oriented, and cooperative with visit. He had a CVC placed on  for temporary dialysis with first dialysis session on . He says that dialysis "wasn't too bad" but he still would not want to continue dialysis long term. He is scheduled for dialysis again this afternoon.     Plan/Recommendations:  1. See goals of care discussion below.  2. Advanced directives packet provided to patient for review.  3. Palliative care will follow up with patient.    Goals of Care/Advance Care Plannin/13 Rounded on patient today, no family members at bedside. Patient is alert, oriented, and cooperative with visit. He has been started on temporary dialysis for volume removal with first dialysis session on . He says that dialysis "wasn't too bad" but he still would not want to continue dialysis long term. He has a poor understanding of his prognosis and his goal is "to get healthy, no matter what." Discussed with patient that interventions being " "performed are for control of his symptoms and that his condition is not curable. He is still considering discharging home with hospice but has not made a firm decision. He remains full code. Advanced directives packet given to patient for review, encouraged him to at least consider completing MPOA and/or living will. Will follow up regarding completion of advanced directives.    1/9 Rounded on patient today, no family members at bedside. Patient is alert, oriented, and receptive to visit. He expresses appreciation for palliative care and  visits and says, "the more you all come, the better I feel." He has not made any firm decisions about plans after discharge but is leaning towards enrollment with [facility]. He likes the idea of having his symptoms managed at home so he does not have to keep coming back to the hospital. [facility] will accept the patient on home IV Dobutamine and/or Lasix if needed. He is full code and he is not ready to consider changing code status to DNR, although change in code status is not required for hospice enrollment. He has poor understanding and insight related to his clinical condition and prognosis; he continues to think that he can prove everyone wrong. He remains unsure about whether he wants to proceed with short term dialysis; he is clear that he would never want long term dialysis even if refusal would result in his death.    1/7 Rounded on patient today with BERTA Granado LCSW, no family members at bedside. Patient is alert, oriented, and cooperative with visit but irritable at times. Patient met with [facility] reps last week and they stopped by today to see him. He remains undecided about hospice but understands that they can help keep him out the hospital after discharge if that is what he wants. He expresses frustration with being in the hospital but also says that he would be willing to come back to the hospital if needed. He does not like the idea of " "going home on Dobutamine drip but now says that he would be willing as long as he does not have to roll an IV pole around. He says he will carry a "purse" if needed. Nephrology rounded on patient today and discussed possible need for dialysis. Patient is willing to consider trying dialysis for a "week or so" but is adamantly opposed to long-term dialysis. He is familiar with dialysis because his brother is a dialysis patient and says that he can't sit still for that long. He states that he would never want long-term dialysis even if he would die without it. At this time he remains full code.    1/6 Rounded on patient today, no family members at bedside. Patient is alert, oriented, and cooperative with visit. He expresses frustration with being in the hospital and has been intermittently refusing IV sticks, lab draws, and vital signs. Reminded patient that he has the option to enroll in hospice if he prefers to discontinue aggressive treatment and focus on comfort. Patient met with [facility] rep last week but has not yet made any decisions; he says that he is still thinking about it. Discussed the plan for patient to discharge on  drip but patient now says that he does not want to be on a drip when he gets discharged because he is not "toting no bag around." Patient has been told by primary team that he will likely need  drip for the rest of his life but he states that he is going to "prove them wrong." He says that he was told at age 42 that he would die without a defibrillator and he is still here so he has "proved them wrong" before. He also says that he would like to go to the rehab hospital down the street when he leaves here so he can get stronger. His goal remains to "get healthy" and he wishes to remain full code. He has poor understanding and insight related to his clinical condition and prognosis. Will involve palliative care  for assistance with emotional support and coping " "skills.    1/3 Conference conducted by this APRN with patient to discuss his current clinical status, goals of care, treatment options, code status, long term expected outcomes and prognostic awareness. After a discussion concerning his values and wishes, patient verbalized limited understanding and insight related to his clinical condition and prognostic awareness. His stated goal is to "get healthy." He has been told that he will need to be on  gtt for the rest of his life but still hopes that he can live a long time. He has never thought about his end of life preferences and whether he would prefer to die at home or at the hospital. Discussed home hospice as an option that would allow him to receive care in the home and provide management of symptoms. Patient is not ready to enroll in hospice at this time but is interested in meeting with a hospice rep to get more information on their services.    Patient has never completed a living will or MPOA. His preference for surrogate decision maker would be his brother Kris [place]. Explained to patient that palliative care can assist with completion of MPOA paperwork whenever he is ready. He is currently a full code and not ready to make any changes to his code status.        I will follow-up with patient. Please contact us if you have any additional questions.    Subjective:     Chief Complaint:   Chief Complaint   Patient presents with    Shortness of Breath     reports fluid retention        HPI:   Mr. Terrell is a 55 y/o male with PMHx of chronic combined systolic and diastolic heart failure (EF 25%), afib on Coumadin, CAD, CVA, and history of PE who presented to [facility] ED on 12/24 for evaluation of shortness of breath and edema. He was just admitted to Hospital Medicine from 11/24-12/8 for CHF exacerbation. During that admission, he was evaluated by Cardiology who started him on Dobutamine and Diuril. He was resistant to HTS evaluation during that stay, which " included recommendations for LVAD, transplant, and ICD. He was discharged home on Lasix 80mg PO BID. He endorsed compliance with his diuretic regimen, but despite this and following a low salt diet, he continued to have dyspnea, orthopnea, edema and weight gain. He also complained of mostly dry cough with occasional production of blood tinged sputum.     CXR in the ED showed cardiomegaly and pulmonary edema, along with a masslike opacity. CT chest was ordered to evaluate the mass, but he was unable to lie flat to get it evaluated. He was given Lasix 80mg IV and was admitted to Hospital Medicine for further management. He was started on Lasix and  gtt.    Hospital Course:  No notes on file    Interval History: Patient has remained on  gtt at 2.5 mcg/kg since admission. He initially received several doses of IV Diuril but diuresis was limited. He responded well after Lasix gtt was increased to 30 mg/hr. Lasix gtt was stopped and patient was started on Lasix IVP on 1/3 but he began to worsen symptomatically. He received IV Diuril on 1/4 and was started back on Lasix gtt on 1/5. He had an episode of epitaxis on 1/4 and required 1 unit PRBC transfusion after Hgb dropped to 6.8. He again received Diuril on 1/6 and 1/7. He agreed to temporary dialysis for volume removal and had a CVC placed on 1/11. He had his first HD session on 1/12. He is scheduled for HD again this afternoon. Lasix held for HD.    Past Medical History:   Diagnosis Date    Anticoagulant long-term use     Cardiomegaly     Chronic combined systolic and diastolic congestive heart failure     Coronary artery disease     Heart attack 2006    Hypertension     Hyperthyroidism, subclinical 1/2/2013    MI (myocardial infarction) 9/22/2013    MI in 2009      Paroxysmal atrial fibrillation     PE (pulmonary embolism) 1/1/2013    IN 2010     S/P ablation of atrial flutter 2008    Stroke 2009    no residual weaknesses       Past Surgical History:    Procedure Laterality Date    COLONOSCOPY N/A 12/2/2019    Procedure: COLONOSCOPY;  Surgeon: Scott Rosario MD;  [facility]  Service: Endoscopy;  Laterality: N/A;    ESOPHAGOGASTRODUODENOSCOPY N/A 12/2/2019    Procedure: EGD (ESOPHAGOGASTRODUODENOSCOPY);  Surgeon: Scott Rosario MD;  [facility]  Service: Endoscopy;  Laterality: N/A;    RADIOFREQUENCY ABLATION  01/08/2008    for atrial flutter       Review of patient's allergies indicates:   Allergen Reactions    Acetaminophen      Itching    Oxycodone-acetaminophen      Other reaction(s): Itching    Ace inhibitors Other (See Comments)     cough       Medications:  Continuous Infusions:   DOBUTamine 2.5 mcg/kg/min (01/12/20 1500)    furosemide (LASIX) 2 mg/mL infusion (non-titrating)       Scheduled Meds:   aspirin  81 mg Oral Daily    ferrous sulfate  325 mg Oral Daily    furosemide  120 mg Intravenous Once    isosorbide-hydrALAZINE 20-37.5 mg  1 tablet Oral BID    metOLazone  10 mg Oral Once    warfarin  6 mg Oral Daily     PRN Meds:acetaminophen, Dextrose 10% Bolus, Dextrose 10% Bolus, diphenhydrAMINE, glucagon (human recombinant), glucose, glucose, melatonin, methocarbamol, methyl salicylate-menthol 15-10%, morphine, ondansetron, oxyCODONE, promethazine (PHENERGAN) IVPB, senna-docusate 8.6-50 mg, sodium chloride    Family History     Problem Relation (Age of Onset)    Alcohol abuse Father    Hypertension Mother, Father, Sister, Brother    Stroke Mother        Tobacco Use    Smoking status: Never Smoker    Smokeless tobacco: Never Used   Substance and Sexual Activity    Alcohol use: No    Drug use: No    Sexual activity: Never       Review of Systems   Constitutional: Negative for appetite change and fatigue.   HENT: Negative for sore throat and trouble swallowing.    Respiratory: Positive for shortness of breath. Negative for cough and wheezing.    Cardiovascular: Positive for leg swelling.  Negative for chest pain.   Gastrointestinal: Positive for constipation and nausea. Negative for diarrhea and vomiting.   Genitourinary: Negative for dysuria and hematuria.   Musculoskeletal: Positive for arthralgias (hips) and back pain. Negative for neck pain.   Skin: Positive for wound (RLE). Negative for rash.   Neurological: Negative for dizziness and light-headedness.   Psychiatric/Behavioral: Negative for confusion and dysphoric mood. The patient is not nervous/anxious.      Objective:     Vital Signs (Most Recent):  Temp: 98.3 °F (36.8 °C) (01/13/20 1445)  Pulse: 103 (01/13/20 1520)  Resp: 17 (01/13/20 1445)  BP: 122/75 (01/13/20 1445)  SpO2: 96 % (01/13/20 0900) Vital Signs (24h Range):  Temp:  [97.6 °F (36.4 °C)-98.4 °F (36.9 °C)] 98.3 °F (36.8 °C)  Pulse:  [] 103  Resp:  [12-19] 17  SpO2:  [94 %-98 %] 96 %  BP: (116-141)/(59-83) 122/75     Weight: 122.6 kg (270 lb 4.5 oz)  Body mass index is 39.91 kg/m².    Review of Symptoms  Symptom Assessment (ESAS 0-10 scale)   ESAS 0 1 2 3 4 5 6 7 8 9 10   Pain    X          Dyspnea  X            Anxiety X             Nausea X             Depression  X             Anorexia X             Fatigue X             Insomnia X             Restlessness  X             Agitation X             CAM / Delirium _X_ --  ___+   Constipation     __ --  _X_+   Diarrhea           _X_ --  ___+  Bowel Management Plan (BMP): Yes    Comments: Senna-Docusate ordered BID PRN    Pain Assessment: Location: bilateral hips, legs and back  Quality: shooting  Quantity: 3/10 in intensity  Aggravating factors: worsens with movement or activity  Alleviating factors: improves with Morphine and Oxycodone      OME in 24 hours: 30    Performance Status: 50    Physical Exam   Constitutional: He is oriented to person, place, and time. He appears well-developed and well-nourished.   On  gtt   HENT:   Head: Normocephalic and atraumatic.   Eyes: Conjunctivae and EOM are normal.   Neck: Normal range of  motion. Neck supple.   Cardiovascular: Normal rate and regular rhythm.   Pulmonary/Chest: Effort normal. No respiratory distress.   Abdominal: Soft. There is no tenderness.   Musculoskeletal: Normal range of motion. He exhibits edema.   Neurological: He is alert and oriented to person, place, and time.   Skin: Skin is warm and dry.   Dressing to RLE ulceration   Psychiatric: His behavior is normal. Thought content normal. Cognition and memory are normal.   Vitals reviewed.      Significant Labs: All pertinent labs within the past 24 hours have been reviewed.  CBC:   Recent Labs   Lab 20  0508   WBC 8.82   HGB 6.3*   HCT 21.3*   MCV 86        BMP:  Recent Labs   Lab 20  0508   *      K 3.7   CL 94*   CO2 32*   *   CREATININE 2.3*   CALCIUM 9.9   MG 2.5     LFT:  Lab Results   Component Value Date    AST 26 2020     (H) 2019    ALKPHOS 197 (H) 2020    BILITOT 1.6 (H) 2020     Albumin:   Albumin   Date Value Ref Range Status   2020 3.3 (L) 3.5 - 5.2 g/dL Final     Protein:   Total Protein   Date Value Ref Range Status   2020 8.7 (H) 6.0 - 8.4 g/dL Final     Lactic acid:   Lab Results   Component Value Date    LACTATE 1.0 2019    LACTATE 1.0 2019       Significant Imaging: I have reviewed all pertinent imaging results/findings within the past 24 hours.    Advance Care Planning   Advanced Directives::  Living Will: No  LaPOST: No  Do Not Resuscitate Status: No  Medical Power of : No. Patient identifies brother Kris [place] ([phone]) as his surrogate decision maker.    Decision-Making Capacity: Patient answered questions       Living Arrangements: Lives alone    Psychosocial/Cultural:  Patient is unmarried and has no children. His parents are . He has 5 siblings and a lot of cousins and friends. He lives alone in [place] but says his brother Kris lives right next to him. 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 He says that his goal is to get healthy. He worries about dying.    Spiritual:     F- Melina and Belief: does not identify with a particular Catholic but prays to God    I - Importance: somewhat important  .  C - Community: does not belong to a Sikh    A - Address in Care:  visits      > 50% of 35 min visit spent in chart review, face to face discussion of goals of care, symptom assessment, coordination of care and emotional support.    Vira Davila NP  Palliative Medicine  [facility]

## 2020-01-14 NOTE — PROGRESS NOTES
"[facility]  Palliative Medicine  Progress Note    Patient Name: Hamlet Terrell  MRN: [id number]  Admission Date: 2019  Hospital Length of Stay: 21 days  Code Status: Full Code   Attending Provider: Antoinette Goins MD  Consulting Provider: Vira Davila NP  Primary Care Physician: Carlin Swift MD  Principal Problem:Acute on chronic combined systolic and diastolic congestive heart failure    Patient information was obtained from patient, past medical records and Dr. Goins.      Assessment/Plan:     Palliative care encounter  Palliative care consulted for goals of care discussion/advanced care planning for this 55 y/o male being followed by hospital medicine for Acute on Chronic Combined Systolic and Diastolic Heart Failure, Cardiomyopathy, JEAN-PIERRE on CKD3, Chronic AFib, Essential HTN, CAD, Anemia, Elevated Alk Phos, Abnormal CT, History of MI, History of VTE and History of Stroke. Rounded on patient today, no family members at bedside. Patient is alert and oriented but not very talkative and slow to respond to questions. He was supposed to have his second dialysis treatment on  but line was clotted. TPA instilled in each port of HD catheter this morning. Planned for HD this afternoon.    Plan/Recommendations:  1. See goals of care discussion below.  2. Will assist with completion of advanced directives packet when patient is ready; copy at bedside.  3. Palliative care will follow up with patient.    Goals of Care/Advance Care Plannin/14 Rounded on patient today, no family members at bedside. Patient is alert and oriented but not very talkative and slow to respond to questions. He is agreeable to another dialysis session this afternoon but continues to say that he would not want HD long term. Patient's goal is still to "get healthy," again explained that his condition is not curable but symptoms can be managed with medications. He continues to have poor insight into his condition and is " "not receptive to discussions regarding prognosis. Tried to discuss what the future may look like but patient says he is not thinking about the future and wants to take things day by day. He is aware that home hospice is available to him and would allow him to stay out of the hospital after discharge. Patient has advanced directives packet at bedside but has not yet looked at it, encouraged him to read through it tonight. Will continue to follow up with patient.    1/13 Rounded on patient today, no family members at bedside. Patient is alert, oriented, and cooperative with visit. He has been started on temporary dialysis for volume removal with first dialysis session on 1/12. He says that dialysis "wasn't too bad" but he still would not want to continue dialysis long term. He has a poor understanding of his prognosis and his goal is "to get healthy, no matter what." Discussed with patient that interventions being performed are for control of his symptoms and that his condition is not curable. He is still considering discharging home with hospice but has not made a firm decision. He remains full code. Advanced directives packet given to patient for review, encouraged him to at least consider completing MPOA and/or living will. Will follow up regarding completion of advanced directives.    1/9 Rounded on patient today, no family members at bedside. Patient is alert, oriented, and receptive to visit. He expresses appreciation for palliative care and  visits and says, "the more you all come, the better I feel." He has not made any firm decisions about plans after discharge but is leaning towards enrollment with [facility]. He likes the idea of having his symptoms managed at home so he does not have to keep coming back to the hospital. [facility] will accept the patient on home IV Dobutamine and/or Lasix if needed. He is full code and he is not ready to consider changing code status to DNR, although " "change in code status is not required for hospice enrollment. He has poor understanding and insight related to his clinical condition and prognosis; he continues to think that he can prove everyone wrong. He remains unsure about whether he wants to proceed with short term dialysis; he is clear that he would never want long term dialysis even if refusal would result in his death.    1/7 Rounded on patient today with BERTA Granado LCSW, no family members at bedside. Patient is alert, oriented, and cooperative with visit but irritable at times. Patient met with [facility] reps last week and they stopped by today to see him. He remains undecided about hospice but understands that they can help keep him out the hospital after discharge if that is what he wants. He expresses frustration with being in the hospital but also says that he would be willing to come back to the hospital if needed. He does not like the idea of going home on Dobutamine drip but now says that he would be willing as long as he does not have to roll an IV pole around. He says he will carry a "purse" if needed. Nephrology rounded on patient today and discussed possible need for dialysis. Patient is willing to consider trying dialysis for a "week or so" but is adamantly opposed to long-term dialysis. He is familiar with dialysis because his brother is a dialysis patient and says that he can't sit still for that long. He states that he would never want long-term dialysis even if he would die without it. At this time he remains full code.    1/6 Rounded on patient today, no family members at bedside. Patient is alert, oriented, and cooperative with visit. He expresses frustration with being in the hospital and has been intermittently refusing IV sticks, lab draws, and vital signs. Reminded patient that he has the option to enroll in hospice if he prefers to discontinue aggressive treatment and focus on comfort. Patient met with [facility] rep last " "week but has not yet made any decisions; he says that he is still thinking about it. Discussed the plan for patient to discharge on  drip but patient now says that he does not want to be on a drip when he gets discharged because he is not "toting no bag around." Patient has been told by primary team that he will likely need  drip for the rest of his life but he states that he is going to "prove them wrong." He says that he was told at age 42 that he would die without a defibrillator and he is still here so he has "proved them wrong" before. He also says that he would like to go to the rehab hospital down the street when he leaves here so he can get stronger. His goal remains to "get healthy" and he wishes to remain full code. He has poor understanding and insight related to his clinical condition and prognosis. Will involve palliative care  for assistance with emotional support and coping skills.    1/3 Conference conducted by this APRN with patient to discuss his current clinical status, goals of care, treatment options, code status, long term expected outcomes and prognostic awareness. After a discussion concerning his values and wishes, patient verbalized limited understanding and insight related to his clinical condition and prognostic awareness. His stated goal is to "get healthy." He has been told that he will need to be on  gtt for the rest of his life but still hopes that he can live a long time. He has never thought about his end of life preferences and whether he would prefer to die at home or at the hospital. Discussed home hospice as an option that would allow him to receive care in the home and provide management of symptoms. Patient is not ready to enroll in hospice at this time but is interested in meeting with a hospice rep to get more information on their services.    Patient has never completed a living will or MPOA. His preference for surrogate decision maker would be his " brother Kris Canada. Explained to patient that palliative care can assist with completion of MPOA paperwork whenever he is ready. He is currently a full code and not ready to make any changes to his code status.        I will follow-up with patient. Please contact us if you have any additional questions.    Subjective:     Chief Complaint:   Chief Complaint   Patient presents with    Shortness of Breath     reports fluid retention        HPI:   Mr. Terrell is a 53 y/o male with PMHx of chronic combined systolic and diastolic heart failure (EF 25%), afib on Coumadin, CAD, CVA, and history of PE who presented to [facility] ED on 12/24 for evaluation of shortness of breath and edema. He was just admitted to Hospital Medicine from 11/24-12/8 for CHF exacerbation. During that admission, he was evaluated by Cardiology who started him on Dobutamine and Diuril. He was resistant to HTS evaluation during that stay, which included recommendations for LVAD, transplant, and ICD. He was discharged home on Lasix 80mg PO BID. He endorsed compliance with his diuretic regimen, but despite this and following a low salt diet, he continued to have dyspnea, orthopnea, edema and weight gain. He also complained of mostly dry cough with occasional production of blood tinged sputum.     CXR in the ED showed cardiomegaly and pulmonary edema, along with a masslike opacity. CT chest was ordered to evaluate the mass, but he was unable to lie flat to get it evaluated. He was given Lasix 80mg IV and was admitted to Hospital Medicine for further management. He was started on Lasix and  gtt.    Hospital Course:  No notes on file    Interval History: Patient has remained on  gtt at 2.5 mcg/kg since admission. He initially received several doses of IV Diuril but diuresis was limited. He responded well after Lasix gtt was increased to 30 mg/hr. Lasix gtt was stopped and patient was started on Lasix IVP on 1/3 but he began to worsen symptomatically.  He received IV Diuril on 1/4 and was started back on Lasix gtt on 1/5. He has had recurrent epitaxis required 1 unit PRBC transfusion on 1/4 after Hgb dropped to 6.8. He again received Diuril on 1/6 and 1/7.    Patient agreed to temporary dialysis for volume removal and had a CVC placed on 1/11. He had his first HD session on 1/12. He was supposed to have HD again on 1/13 but line was clotted. TPA instilled in each port of HD catheter this morning. Planned for HD this afternoon.    Past Medical History:   Diagnosis Date    Anticoagulant long-term use     Cardiomegaly     Chronic combined systolic and diastolic congestive heart failure     Coronary artery disease     Heart attack 2006    Hypertension     Hyperthyroidism, subclinical 1/2/2013    MI (myocardial infarction) 9/22/2013    MI in 2009      Paroxysmal atrial fibrillation     PE (pulmonary embolism) 1/1/2013    IN 2010     S/P ablation of atrial flutter 2008    Stroke 2009    no residual weaknesses       Past Surgical History:   Procedure Laterality Date    COLONOSCOPY N/A 12/2/2019    Procedure: COLONOSCOPY;  Surgeon: Scott Rosario MD;  Location: [facility] ([address] [place] [address]);  Service: Endoscopy;  Laterality: N/A;    ESOPHAGOGASTRODUODENOSCOPY N/A 12/2/2019    Procedure: EGD (ESOPHAGOGASTRODUODENOSCOPY);  Surgeon: Scott Rosario MD;  Location: [facility] ([address] [place] [address]);  Service: Endoscopy;  Laterality: N/A;    RADIOFREQUENCY ABLATION  01/08/2008    for atrial flutter       Review of patient's allergies indicates:   Allergen Reactions    Acetaminophen      Itching    Oxycodone-acetaminophen      Other reaction(s): Itching    Ace inhibitors Other (See Comments)     cough       Medications:  Continuous Infusions:   DOBUTamine 2.5 mcg/kg/min (01/13/20 0377)     Scheduled Meds:   aspirin  81 mg Oral Daily    ferrous sulfate  325 mg Oral Daily    isosorbide-hydrALAZINE 20-37.5 mg  1 tablet Oral BID    potassium bicarbonate  25 mEq Oral Q2H     warfarin  6 mg Oral Daily     PRN Meds:acetaminophen, Dextrose 10% Bolus, Dextrose 10% Bolus, diphenhydrAMINE, glucagon (human recombinant), glucose, glucose, melatonin, methocarbamol, methyl salicylate-menthol 15-10%, morphine, ondansetron, oxyCODONE, promethazine (PHENERGAN) IVPB, senna-docusate 8.6-50 mg, sodium chloride    Family History     Problem Relation (Age of Onset)    Alcohol abuse Father    Hypertension Mother, Father, Sister, Brother    Stroke Mother        Tobacco Use    Smoking status: Never Smoker    Smokeless tobacco: Never Used   Substance and Sexual Activity    Alcohol use: No    Drug use: No    Sexual activity: Never       Review of Systems   Constitutional: Negative for appetite change and fatigue.   HENT: Negative for sore throat and trouble swallowing.    Respiratory: Positive for shortness of breath. Negative for cough and wheezing.    Cardiovascular: Positive for leg swelling. Negative for chest pain.   Gastrointestinal: Positive for constipation and nausea. Negative for diarrhea and vomiting.   Genitourinary: Negative for dysuria and hematuria.   Musculoskeletal: Positive for arthralgias (hips) and back pain. Negative for neck pain.   Skin: Positive for wound (RLE). Negative for rash.   Neurological: Negative for dizziness and light-headedness.   Psychiatric/Behavioral: Negative for confusion and dysphoric mood. The patient is not nervous/anxious.      Objective:     Vital Signs (Most Recent):  Temp: 99 °F (37.2 °C) (01/14/20 1133)  Pulse: (!) 144 (01/14/20 1133)  Resp: 17 (01/14/20 1133)  BP: 139/77 (01/14/20 1133)  SpO2: 95 % (01/14/20 1133) Vital Signs (24h Range):  Temp:  [97 °F (36.1 °C)-99 °F (37.2 °C)] 99 °F (37.2 °C)  Pulse:  [] 144  Resp:  [16-18] 17  SpO2:  [92 %-96 %] 95 %  BP: (119-139)/(60-77) 139/77     Weight: 123.2 kg (271 lb 9.7 oz)  Body mass index is 40.11 kg/m².    Review of Symptoms  Symptom Assessment (ESAS 0-10 scale)   ESAS 0 1 2 3 4 5 6 7 8 9 10   Pain X              Dyspnea  X            Anxiety X             Nausea X             Depression  X             Anorexia X             Fatigue X             Insomnia X             Restlessness  X             Agitation X             CAM / Delirium _X_ --  ___+   Constipation     __ --  _X_+   Diarrhea           _X_ --  ___+  Bowel Management Plan (BMP): Yes    Comments: Senna-Docusate ordered BID PRN    Pain Assessment: denies pain at this time; reports intermittent shooting pain to bilateral hips, legs and back that worsens with movement or activity and improves with Oxycodone      OME in 24 hours: 30    Performance Status: 50    Physical Exam   Constitutional: He is oriented to person, place, and time. He appears well-developed and well-nourished.   On  gtt   HENT:   Head: Normocephalic and atraumatic.   Eyes: Conjunctivae and EOM are normal.   Neck: Normal range of motion. Neck supple.   Cardiovascular: Regular rhythm. Tachycardia present.   Pulmonary/Chest: Effort normal. No respiratory distress.   Abdominal: Soft. There is no tenderness.   Musculoskeletal: Normal range of motion. He exhibits edema.   Neurological: He is alert and oriented to person, place, and time.   Skin: Skin is warm and dry.   Dressing to RLE ulceration   Psychiatric: His behavior is normal. Thought content normal. Cognition and memory are normal.   Vitals reviewed.      Significant Labs: All pertinent labs within the past 24 hours have been reviewed.  CBC:   Recent Labs   Lab 01/14/20  0347   WBC 8.80   HGB 6.4*   HCT 22.3*   MCV 87        BMP:  Recent Labs   Lab 01/14/20  0347   *      K 3.5   CL 91*   CO2 34*   *   CREATININE 2.7*   CALCIUM 10.0   MG 2.6     LFT:  Lab Results   Component Value Date    AST 25 01/14/2020     (H) 04/21/2019    ALKPHOS 192 (H) 01/14/2020    BILITOT 1.9 (H) 01/14/2020     Albumin:   Albumin   Date Value Ref Range Status   01/14/2020 3.5 3.5 - 5.2 g/dL Final     Protein:   Total  Protein   Date Value Ref Range Status   2020 8.7 (H) 6.0 - 8.4 g/dL Final     Lactic acid:   Lab Results   Component Value Date    LACTATE 1.0 2019    LACTATE 1.0 2019       Significant Imaging: I have reviewed all pertinent imaging results/findings within the past 24 hours.    Advance Care Planning   Advanced Directives::  Living Will: No  LaPOST: No  Do Not Resuscitate Status: No  Medical Power of : No. Patient identifies brother Kris [place] ([phone]) as his surrogate decision maker.    Decision-Making Capacity: Patient answered questions       Living Arrangements: Lives alone    Psychosocial/Cultural:  Patient is unmarried and has no children. His parents are . He has 5 siblings and a lot of cousins and friends. He lives alone in [place] but says his brother Kris lives right next to him. 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 He says that his goal is to get healthy. He worries about dying. He does not like to talk about the future and prefers to take things day by day.    Spiritual:     F- Melina and Belief: does not identify with a particular Advent but prays to God    I - Importance: somewhat important  .  C - Community: does not belong to a Confucianist    A - Address in Care:  visits      > 50% of 25 min visit spent in chart review, face to face discussion of goals of care, symptom assessment, coordination of care and emotional support.    Vira Davila NP  Palliative Medicine  [facility]

## 2020-01-14 NOTE — ASSESSMENT & PLAN NOTE
Mr. Terrell is a 55 y/o male with HFrEF (last 2D ECHO EF 23%, moderate MR, moderate TR, diastolic dysfunction), hypertension, chronic atrial fibrillation, a flutter status post ablation, CVA in 2009, coronary artery disease, CKD III, and obstructive sleep apnea presents with ADCHF. Nephro consulted for JEAN-PIERRE on CKD in the setting of Cardiorenal syndrome.   - JEAN-PIERRE was thought to be to be multifactorial from labile BP, and component of cardiorenal syndrome  - Creatinine up trending since admission, baseline 1.6-1.9, currently 3.0   - Azotemia worsening daily. No uremic symptoms present.     Plan/Rec  - 2nd HD session today for 2 hours with F6 dialyzer for metabolic exchange, third session tomorrow in am   - recommend holding diuretics at this time  - Strict I/O and chart  - Avoid nephrotoxic medications, NSAIDs, IV contrast, etc.  - Daily weights and chart  - Medication doses adjusted to GFR  - maintaining good urine output at 1.4 liters over last 24 hours  - Will follow closely

## 2020-01-14 NOTE — CHAPLAIN
Visited about 10 min w/pt; he asked me for a cup of ice and straw- I confirmed w/nurse it's ok and brought to him. He didn't have many words today, but he did ask me to pray for him again (w/o me prompting). Was going to stay longer, but the dialysis nurse came in, so I told pt I'd keep him in my prayers.

## 2020-01-14 NOTE — ASSESSMENT & PLAN NOTE
"Palliative care consulted for goals of care discussion/advanced care planning for this 55 y/o male being followed by hospital medicine for Acute on Chronic Combined Systolic and Diastolic Heart Failure, Cardiomyopathy, JEAN-PIERRE on CKD3, Chronic AFib, Essential HTN, CAD, Anemia, Elevated Alk Phos, Abnormal CT, History of MI, History of VTE and History of Stroke. Rounded on patient today, no family members at bedside. Patient is alert, oriented, and cooperative with visit. He had a CVC placed on  for temporary dialysis with first dialysis session on . He says that dialysis "wasn't too bad" but he still would not want to continue dialysis long term. He is scheduled for dialysis again this afternoon.     Plan/Recommendations:  1. See goals of care discussion below.  2. Advanced directives packet provided to patient for review.  3. Palliative care will follow up with patient.    Goals of Care/Advance Care Plannin/13 Rounded on patient today, no family members at bedside. Patient is alert, oriented, and cooperative with visit. He has been started on temporary dialysis for volume removal with first dialysis session on . He says that dialysis "wasn't too bad" but he still would not want to continue dialysis long term. He has a poor understanding of his prognosis and his goal is "to get healthy, no matter what." Discussed with patient that interventions being performed are for control of his symptoms and that his condition is not curable. He is still considering discharging home with hospice but has not made a firm decision. He remains full code. Advanced directives packet given to patient for review, encouraged him to at least consider completing MPOA and/or living will. Will follow up regarding completion of advanced directives.     Rounded on patient today, no family members at bedside. Patient is alert, oriented, and receptive to visit. He expresses appreciation for palliative care and  visits and " "says, "the more you all come, the better I feel." He has not made any firm decisions about plans after discharge but is leaning towards enrollment with [facility]. He likes the idea of having his symptoms managed at home so he does not have to keep coming back to the hospital. [facility] will accept the patient on home IV Dobutamine and/or Lasix if needed. He is full code and he is not ready to consider changing code status to DNR, although change in code status is not required for hospice enrollment. He has poor understanding and insight related to his clinical condition and prognosis; he continues to think that he can prove everyone wrong. He remains unsure about whether he wants to proceed with short term dialysis; he is clear that he would never want long term dialysis even if refusal would result in his death.    1/7 Rounded on patient today with BERTA Granado LCSW, no family members at bedside. Patient is alert, oriented, and cooperative with visit but irritable at times. Patient met with [facility] reps last week and they stopped by today to see him. He remains undecided about hospice but understands that they can help keep him out the hospital after discharge if that is what he wants. He expresses frustration with being in the hospital but also says that he would be willing to come back to the hospital if needed. He does not like the idea of going home on Dobutamine drip but now says that he would be willing as long as he does not have to roll an IV pole around. He says he will carry a "purse" if needed. Nephrology rounded on patient today and discussed possible need for dialysis. Patient is willing to consider trying dialysis for a "week or so" but is adamantly opposed to long-term dialysis. He is familiar with dialysis because his brother is a dialysis patient and says that he can't sit still for that long. He states that he would never want long-term dialysis even if he would die without it. " "At this time he remains full code.    1/6 Rounded on patient today, no family members at bedside. Patient is alert, oriented, and cooperative with visit. He expresses frustration with being in the hospital and has been intermittently refusing IV sticks, lab draws, and vital signs. Reminded patient that he has the option to enroll in hospice if he prefers to discontinue aggressive treatment and focus on comfort. Patient met with [facility] rep last week but has not yet made any decisions; he says that he is still thinking about it. Discussed the plan for patient to discharge on  drip but patient now says that he does not want to be on a drip when he gets discharged because he is not "toting no bag around." Patient has been told by primary team that he will likely need  drip for the rest of his life but he states that he is going to "prove them wrong." He says that he was told at age 42 that he would die without a defibrillator and he is still here so he has "proved them wrong" before. He also says that he would like to go to the rehab hospital down the street when he leaves here so he can get stronger. His goal remains to "get healthy" and he wishes to remain full code. He has poor understanding and insight related to his clinical condition and prognosis. Will involve palliative care  for assistance with emotional support and coping skills.    1/3 Conference conducted by this APRN with patient to discuss his current clinical status, goals of care, treatment options, code status, long term expected outcomes and prognostic awareness. After a discussion concerning his values and wishes, patient verbalized limited understanding and insight related to his clinical condition and prognostic awareness. His stated goal is to "get healthy." He has been told that he will need to be on  gtt for the rest of his life but still hopes that he can live a long time. He has never thought about his end of " life preferences and whether he would prefer to die at home or at the hospital. Discussed home hospice as an option that would allow him to receive care in the home and provide management of symptoms. Patient is not ready to enroll in hospice at this time but is interested in meeting with a hospice rep to get more information on their services.    Patient has never completed a living will or MPOA. His preference for surrogate decision maker would be his brother Kris [place]. Explained to patient that palliative care can assist with completion of MPOA paperwork whenever he is ready. He is currently a full code and not ready to make any changes to his code status.

## 2020-01-14 NOTE — SUBJECTIVE & OBJECTIVE
Interval History: Patient seen and examined at bedside, no acute events overnight. scheduled for second day HD today. Third session tomorrow.     Review of patient's allergies indicates:   Allergen Reactions    Acetaminophen      Itching    Oxycodone-acetaminophen      Other reaction(s): Itching    Ace inhibitors Other (See Comments)     cough     Current Facility-Administered Medications   Medication Frequency    acetaminophen tablet 650 mg Q4H PRN    aspirin EC tablet 81 mg Daily    dextrose 10% (D10W) Bolus PRN    dextrose 10% (D10W) Bolus PRN    diphenhydrAMINE capsule 25 mg Q6H PRN    DOBUTamine 500mg in D5W 250mL infusion (premix) (NON-TITRATING) Continuous    ferrous sulfate EC tablet 325 mg Daily    glucagon (human recombinant) injection 1 mg PRN    glucose chewable tablet 16 g PRN    glucose chewable tablet 24 g PRN    isosorbide-hydrALAZINE 20-37.5 mg per tablet 1 tablet BID    melatonin tablet 6 mg Nightly PRN    methocarbamol tablet 500 mg TID PRN    methyl salicylate-menthol 15-10% cream TID PRN    morphine injection 4 mg Q6H PRN    ondansetron injection 8 mg Q8H PRN    oxyCODONE immediate release tablet 15 mg Q4H PRN    potassium bicarbonate disintegrating tablet 25 mEq Q2H    promethazine (PHENERGAN) 25 mg in dextrose 5 % 50 mL IVPB Q6H PRN    senna-docusate 8.6-50 mg per tablet 1 tablet BID PRN    sodium chloride 0.65 % nasal spray 1 spray PRN    warfarin (COUMADIN) tablet 6 mg Daily       Objective:     Vital Signs (Most Recent):  Temp: 97.7 °F (36.5 °C) (01/14/20 1628)  Pulse: 87 (01/14/20 1628)  Resp: 17 (01/14/20 1628)  BP: (!) 130/3 (01/14/20 1700)  SpO2: (!) 94 % (01/14/20 1700)  O2 Device (Oxygen Therapy): room air (01/13/20 1445) Vital Signs (24h Range):  Temp:  [97 °F (36.1 °C)-99 °F (37.2 °C)] 97.7 °F (36.5 °C)  Pulse:  [] 87  Resp:  [16-18] 17  SpO2:  [89 %-96 %] 94 %  BP: (119-152)/(3-77) 130/3     Weight: 123.2 kg (271 lb 9.7 oz) (01/14/20 0600)  Body mass  index is 40.11 kg/m².  Body surface area is 2.45 meters squared.    I/O last 3 completed shifts:  In: 845 [P.O.:845]  Out: 2475 [Urine:2475]    Physical Exam   Constitutional: He is oriented to person, place, and time. No distress.   Sick appearing male   HENT:   Head: Normocephalic and atraumatic.   Right Ear: External ear normal.   Left Ear: External ear normal.   Nose: Nose normal.   Eyes: Pupils are equal, round, and reactive to light. EOM are normal. No scleral icterus.   Neck: JVD present. No tracheal deviation present.   Cardiovascular: Normal rate, regular rhythm and intact distal pulses. Exam reveals gallop.   Pulses:       Radial pulses are 2+ on the right side, and 2+ on the left side.   Pulmonary/Chest: Effort normal. No stridor. He has no wheezes. He has rales (bibasilar).   Abdominal: Soft. There is no tenderness. There is no guarding.   Musculoskeletal: He exhibits edema and tenderness.   Neurological: He is alert and oriented to person, place, and time.   asterixis   Skin: Skin is warm and dry. Rash (chronic venous stasis ulceration to the lower extremities bilaterally) noted. He is not diaphoretic.   Psychiatric: He has a normal mood and affect. His behavior is normal.   Nursing note and vitals reviewed.      Significant Labs:  CBC:   Recent Labs   Lab 01/14/20  0347   WBC 8.80   RBC 2.56*   HGB 6.4*   HCT 22.3*      MCV 87   MCH 25.0*   MCHC 28.7*     CMP:   Recent Labs   Lab 01/14/20  0347   *   CALCIUM 10.0   ALBUMIN 3.5   PROT 8.7*      K 3.5   CO2 34*   CL 91*   *   CREATININE 2.7*   ALKPHOS 192*   ALT 17   AST 25   BILITOT 1.9*     All labs within the past 24 hours have been reviewed.     Significant Imaging:  Labs: Reviewed

## 2020-01-15 LAB
ABO + RH BLD: NORMAL
ALBUMIN SERPL BCP-MCNC: 3.3 G/DL (ref 3.5–5.2)
ALP SERPL-CCNC: 185 U/L (ref 55–135)
ALT SERPL W/O P-5'-P-CCNC: 16 U/L (ref 10–44)
ANION GAP SERPL CALC-SCNC: 14 MMOL/L (ref 8–16)
AST SERPL-CCNC: 24 U/L (ref 10–40)
BASOPHILS # BLD AUTO: 0.03 K/UL (ref 0–0.2)
BASOPHILS NFR BLD: 0.3 % (ref 0–1.9)
BILIRUB SERPL-MCNC: 1.8 MG/DL (ref 0.1–1)
BLD GP AB SCN CELLS X3 SERPL QL: NORMAL
BLD PROD TYP BPU: NORMAL
BLOOD UNIT EXPIRATION DATE: NORMAL
BLOOD UNIT TYPE CODE: 8400
BLOOD UNIT TYPE: NORMAL
BUN SERPL-MCNC: 118 MG/DL (ref 6–20)
CALCIUM SERPL-MCNC: 10 MG/DL (ref 8.7–10.5)
CHLORIDE SERPL-SCNC: 95 MMOL/L (ref 95–110)
CO2 SERPL-SCNC: 31 MMOL/L (ref 23–29)
CODING SYSTEM: NORMAL
CREAT SERPL-MCNC: 2.5 MG/DL (ref 0.5–1.4)
DIFFERENTIAL METHOD: ABNORMAL
DISPENSE STATUS: NORMAL
EOSINOPHIL # BLD AUTO: 0.2 K/UL (ref 0–0.5)
EOSINOPHIL NFR BLD: 2.4 % (ref 0–8)
ERYTHROCYTE [DISTWIDTH] IN BLOOD BY AUTOMATED COUNT: 22 % (ref 11.5–14.5)
EST. GFR  (AFRICAN AMERICAN): 32.7 ML/MIN/1.73 M^2
EST. GFR  (NON AFRICAN AMERICAN): 28.3 ML/MIN/1.73 M^2
GLUCOSE SERPL-MCNC: 113 MG/DL (ref 70–110)
HCT VFR BLD AUTO: 22.9 % (ref 40–54)
HGB BLD-MCNC: 6.5 G/DL (ref 14–18)
IMM GRANULOCYTES # BLD AUTO: 0.09 K/UL (ref 0–0.04)
IMM GRANULOCYTES NFR BLD AUTO: 0.9 % (ref 0–0.5)
INR PPP: 1.9 (ref 0.8–1.2)
LYMPHOCYTES # BLD AUTO: 0.7 K/UL (ref 1–4.8)
LYMPHOCYTES NFR BLD: 7.2 % (ref 18–48)
MAGNESIUM SERPL-MCNC: 2.4 MG/DL (ref 1.6–2.6)
MCH RBC QN AUTO: 24.9 PG (ref 27–31)
MCHC RBC AUTO-ENTMCNC: 28.4 G/DL (ref 32–36)
MCV RBC AUTO: 88 FL (ref 82–98)
MONOCYTES # BLD AUTO: 1.7 K/UL (ref 0.3–1)
MONOCYTES NFR BLD: 17.2 % (ref 4–15)
NEUTROPHILS # BLD AUTO: 7 K/UL (ref 1.8–7.7)
NEUTROPHILS NFR BLD: 72 % (ref 38–73)
NRBC BLD-RTO: 0 /100 WBC
PHOSPHATE SERPL-MCNC: 4 MG/DL (ref 2.7–4.5)
PLATELET # BLD AUTO: 264 K/UL (ref 150–350)
PMV BLD AUTO: 9.8 FL (ref 9.2–12.9)
POTASSIUM SERPL-SCNC: 3.5 MMOL/L (ref 3.5–5.1)
PROT SERPL-MCNC: 8.6 G/DL (ref 6–8.4)
PROTHROMBIN TIME: 18.1 SEC (ref 9–12.5)
RBC # BLD AUTO: 2.61 M/UL (ref 4.6–6.2)
SODIUM SERPL-SCNC: 140 MMOL/L (ref 136–145)
TRANS ERYTHROCYTES VOL PATIENT: NORMAL ML
WBC # BLD AUTO: 9.76 K/UL (ref 3.9–12.7)

## 2020-01-15 PROCEDURE — 25000003 PHARM REV CODE 250: Performed by: HOSPITALIST

## 2020-01-15 PROCEDURE — 99232 SBSQ HOSP IP/OBS MODERATE 35: CPT | Mod: ,,, | Performed by: INTERNAL MEDICINE

## 2020-01-15 PROCEDURE — 85025 COMPLETE CBC W/AUTO DIFF WBC: CPT

## 2020-01-15 PROCEDURE — 63600175 PHARM REV CODE 636 W HCPCS: Performed by: HOSPITALIST

## 2020-01-15 PROCEDURE — 99232 PR SUBSEQUENT HOSPITAL CARE,LEVL II: ICD-10-PCS | Mod: ,,, | Performed by: NURSE PRACTITIONER

## 2020-01-15 PROCEDURE — 36415 COLL VENOUS BLD VENIPUNCTURE: CPT

## 2020-01-15 PROCEDURE — 27201040 HC RC 50 FILTER

## 2020-01-15 PROCEDURE — 99232 PR SUBSEQUENT HOSPITAL CARE,LEVL II: ICD-10-PCS | Mod: ,,, | Performed by: INTERNAL MEDICINE

## 2020-01-15 PROCEDURE — 84100 ASSAY OF PHOSPHORUS: CPT

## 2020-01-15 PROCEDURE — 83735 ASSAY OF MAGNESIUM: CPT

## 2020-01-15 PROCEDURE — 94761 N-INVAS EAR/PLS OXIMETRY MLT: CPT

## 2020-01-15 PROCEDURE — 99233 SBSQ HOSP IP/OBS HIGH 50: CPT | Mod: ,,, | Performed by: HOSPITALIST

## 2020-01-15 PROCEDURE — 86850 RBC ANTIBODY SCREEN: CPT

## 2020-01-15 PROCEDURE — 99232 SBSQ HOSP IP/OBS MODERATE 35: CPT | Mod: ,,, | Performed by: NURSE PRACTITIONER

## 2020-01-15 PROCEDURE — 99233 PR SUBSEQUENT HOSPITAL CARE,LEVL III: ICD-10-PCS | Mod: ,,, | Performed by: HOSPITALIST

## 2020-01-15 PROCEDURE — 86920 COMPATIBILITY TEST SPIN: CPT

## 2020-01-15 PROCEDURE — 80053 COMPREHEN METABOLIC PANEL: CPT

## 2020-01-15 PROCEDURE — 85610 PROTHROMBIN TIME: CPT

## 2020-01-15 PROCEDURE — 20600001 HC STEP DOWN PRIVATE ROOM

## 2020-01-15 PROCEDURE — P9021 RED BLOOD CELLS UNIT: HCPCS

## 2020-01-15 RX ORDER — HYDROCODONE BITARTRATE AND ACETAMINOPHEN 500; 5 MG/1; MG/1
TABLET ORAL
Status: DISCONTINUED | OUTPATIENT
Start: 2020-01-15 | End: 2020-01-18 | Stop reason: HOSPADM

## 2020-01-15 RX ORDER — POTASSIUM CHLORIDE 20 MEQ/15ML
40 SOLUTION ORAL ONCE
Status: COMPLETED | OUTPATIENT
Start: 2020-01-15 | End: 2020-01-15

## 2020-01-15 RX ADMIN — OXYCODONE HYDROCHLORIDE 15 MG: 10 TABLET ORAL at 11:01

## 2020-01-15 RX ADMIN — POTASSIUM CHLORIDE 40 MEQ: 1.5 SOLUTION ORAL at 02:01

## 2020-01-15 RX ADMIN — FUROSEMIDE 40 MG/HR: 10 INJECTION, SOLUTION INTRAMUSCULAR; INTRAVENOUS at 09:01

## 2020-01-15 RX ADMIN — FERROUS SULFATE TAB EC 325 MG (65 MG FE EQUIVALENT) 325 MG: 325 (65 FE) TABLET DELAYED RESPONSE at 09:01

## 2020-01-15 RX ADMIN — HYDRALAZINE HYDROCHLORIDE AND ISOSORBIDE DINITRATE 1 TABLET: 37.5; 2 TABLET, FILM COATED ORAL at 09:01

## 2020-01-15 RX ADMIN — WARFARIN SODIUM 7 MG: 5 TABLET ORAL at 06:01

## 2020-01-15 RX ADMIN — FUROSEMIDE 40 MG/HR: 10 INJECTION, SOLUTION INTRAMUSCULAR; INTRAVENOUS at 11:01

## 2020-01-15 RX ADMIN — ASPIRIN 81 MG: 81 TABLET, COATED ORAL at 09:01

## 2020-01-15 RX ADMIN — METOLAZONE 10 MG: 2.5 TABLET ORAL at 09:01

## 2020-01-15 NOTE — ASSESSMENT & PLAN NOTE
Mr. Terrell is a 53 y/o male with HFrEF (last 2D ECHO EF 23%, moderate MR, moderate TR, diastolic dysfunction), hypertension, chronic atrial fibrillation, a flutter status post ablation, CVA in 2009, coronary artery disease, CKD III, and obstructive sleep apnea presents with ADCHF. Nephro consulted for JEAN-PIERRE on CKD in the setting of Cardiorenal syndrome.   - JEAN-PIERRE was thought to be to be multifactorial from labile BP, and component of cardiorenal syndrome  - Creatinine up trending since admission, baseline 1.6-1.9, currently 3.0   - Azotemia worsening daily. No uremic symptoms present.     Plan/Rec  - will need line replacement due to poor flow, primary team made aware  - recommend holding diuretics at this time  - Strict I/O and chart  - Avoid nephrotoxic medications, NSAIDs, IV contrast, etc  - Daily weights and chart  - Medication doses adjusted to GFR  - maintaining good urine output at 2.0 liters over last 24 hours  - Will follow closely

## 2020-01-15 NOTE — PROGRESS NOTES
Pt experienced a nose bleed; nose bleed was relieved by pressure. Denies a headache or lightheadedness. C paged; no paged returned will try again.

## 2020-01-15 NOTE — PLAN OF CARE
Plan of care discussed with patient. Patient is free of fall/trauma/injury. Denies CP,or  SOB.PRN oxycodone given for pain/discomfort.  and lasix gtts still infusing. All questions addressed. Will continue to monitor

## 2020-01-15 NOTE — ASSESSMENT & PLAN NOTE
25 yo male with HFrEF (25%), CKD 3, and multiple hospital admissions for heart failure exacerbations presenting this admission for acute on chronic heart failure. Despite aggressive diuresis with Lasix 120mg TID, decreased urinary output. He was later transitioned to lasix gtt at 30 mg/hr with multiple doses of diuril without improvement in UPO. Nephrology consult for volume overload refractory to diuresis.    Plan/Recommendation:  - on dobutamine gtt as per cardiology

## 2020-01-15 NOTE — PROGRESS NOTES
Progress Note  Hospital Medicine    Provider team:    [facility] HOSP MED C  [facility] NEPHROLOGY CONSULT SERVICE  Admit Date: 12/24/2019  Encounter Date: 01/15/2020     SUBJECTIVE:     Follow-up Visit for: Acute on chronic combined systolic and diastolic congestive heart failure    HPI (See H&P for complete P,F,SHx):  54M with chronic combined systolic and diastolic heart failure (EF 25%), afib on coumadin, CAD, CVA, and history of PE who presents to the ED for evaluation of shortness of breath and edema.  He was just admitted to Hospital Medicine from 11/24-12/8 for a CHF exacerbation.  During that admission, he was evaluated by Cardiology who started him on Dobutamine and Diuril.  He was resistant to HTS evaluation during that stay, which included recommendations for LVAD, transplant, and ICD.  He was discharged home on Lasix 80mg PO BID.  He endorses compliance with his diuretic regimen, but despite this and following a low salt diet, he has continued to gain weight, have lower extremity and abdominal swelling, as well as shortness of breath.  He is so short of breath that he has to sleep in a chair, and has been unable to sleep because of his breathing.  He denies any chest pain.  He does have a cough that is mostly dry, but occasionally productive of blood tinged sputum.  He was discharged with a weight of 259lbs, and claims that is around his dry weight.  He is currently 286lbs.       Upon arrival to the ED, vitals were /83, HR 88, RR 22.  Labs showed Hemoglobin 7.3, INR 1.5, Creatinine 1.8, Alk Phos 278,  with Trop 0.128. CXR showed cardiomegaly and pulmonary edema, along with a masslike opacity. CT chest was attempted to be ordered to evaluate the mass, but he was unable to lie flat to get it evaluated.  He was given Lasix 80mg IV and was admitted to Hospital Medicine for further management.    TTE 11/27/2019  · Severely decreased LVSF.  · The estimated ejection fraction is 25%  · Concentric left  ventricular hypertrophy.  · Global hypokinetic wall motion.  · Moderate right ventricular enlargement.  · Moderately reduced RVSF.  · Severe biatrial enlargement.  · Moderate mitral regurgitation.  · Moderate tricuspid regurgitation.  · The estimated PA systolic pressure is 40 mm Hg  · Elevated central venous pressure (15 mm Hg).  · Atrial fibrillation observed.    Hospital course:  Patient started on IV diuretics and  infusion for diagnosis of acute on chronic combined heart failure. His weight was stagnant and UOP tapered, so he was transitioned to IVP lasix for IV lasix gtt as this was actually effective during prior admission under guidance from co-management cardiology. However, the patient actually worsened symptomatically and TBili increased along with Cr and weight increase. The patient is now started back on lasix infusion with diuril augmentation. The patient's course was c/b epistaxis, possibly related to his decompensated picture. He required pRBC transfusion. Nephrology has initiated temporary dialysis for the patient.    Interval history:  Nephrology is recommending exchange of dialysis catheter due to sluggish flow. Anesthesiology did come and speak with patient but he refuses exchange until 1/16. The patient is agreeable to a transfusion of pRBC in anticipation of procedure as I did explain we would not want to pursue any procedure with risk of bleeding with HgB <7.  The patient reports some neck pain. He has no CP or SOB. He has generalized body aches and fatigue. Nephrology and palliative care are seeing the patient today.    Wt Readings from Last 30 Encounters:   01/15/20 123.6 kg (272 lb 7.8 oz)   12/07/19 117.6 kg (259 lb 4.2 oz)   10/23/19 108.9 kg (240 lb)   09/09/19 108.9 kg (240 lb)   08/04/19 105.2 kg (232 lb)   07/18/19 108.2 kg (238 lb 8.6 oz)   06/20/19 118.9 kg (262 lb 2 oz)   05/21/19 121.1 kg (267 lb)   05/16/19 116.3 kg (256 lb 6.3 oz)   04/20/19 127.5 kg (281 lb 1.4 oz)    01/31/19 113.4 kg (250 lb)   11/04/18 123.1 kg (271 lb 6.2 oz)   08/31/18 119.6 kg (263 lb 10.7 oz)   07/28/18 113.4 kg (250 lb)   07/10/18 120.7 kg (266 lb)   05/03/18 117.9 kg (259 lb 14.8 oz)   03/30/18 117.9 kg (260 lb)   02/21/18 117.9 kg (260 lb)   01/12/18 113.4 kg (250 lb)   12/10/17 117.9 kg (260 lb)   11/11/17 113.9 kg (251 lb)   11/01/17 111.8 kg (246 lb 7.6 oz)   10/26/17 118.5 kg (261 lb 3.2 oz)   08/13/17 117.9 kg (260 lb)   08/12/17 117.9 kg (260 lb)   05/10/17 117.9 kg (260 lb)   04/05/17 108.4 kg (239 lb 1.4 oz)   02/01/17 108.9 kg (240 lb)   12/15/16 119.4 kg (263 lb 3.7 oz)   08/26/16 112.5 kg (248 lb)     Review of Systems:  Review of Systems   Constitutional: Negative for chills and fever.   HENT: Negative for congestion and sore throat.    Eyes: Negative for photophobia, pain and discharge.   Respiratory: Positive for shortness of breath. Negative for cough, hemoptysis and sputum production.    Cardiovascular: Positive for orthopnea, leg swelling and PND. Negative for chest pain and palpitations.   Gastrointestinal: Negative for abdominal pain, diarrhea, nausea and vomiting.   Genitourinary: Negative for dysuria and urgency.   Musculoskeletal: Positive for neck pain. Negative for myalgias.   Skin: Negative for itching and rash.   Neurological: Negative for sensory change, focal weakness and headaches.   Endo/Heme/Allergies: Negative for polydipsia. Does not bruise/bleed easily.   Psychiatric/Behavioral: Negative for depression and suicidal ideas.     Past Medical History:   Diagnosis Date    Anticoagulant long-term use     Cardiomegaly     Chronic combined systolic and diastolic congestive heart failure     Coronary artery disease     Heart attack 2006    Hypertension     Hyperthyroidism, subclinical 1/2/2013    MI (myocardial infarction) 9/22/2013    MI in 2009      Paroxysmal atrial fibrillation     PE (pulmonary embolism) 1/1/2013    IN 2010     S/P ablation of atrial flutter  "2008    Stroke 2009    no residual weaknesses     Social History     Socioeconomic History    Marital status: Single     Spouse name: Not on file    Number of children: Not on file    Years of education: Not on file    Highest education level: Not on file   Occupational History    Not on file   Social Needs    Financial resource strain: Not on file    Food insecurity:     Worry: Not on file     Inability: Not on file    Transportation needs:     Medical: Not on file     Non-medical: Not on file   Tobacco Use    Smoking status: Never Smoker    Smokeless tobacco: Never Used   Substance and Sexual Activity    Alcohol use: No    Drug use: No    Sexual activity: Never   Lifestyle    Physical activity:     Days per week: Not on file     Minutes per session: Not on file    Stress: Not on file   Relationships    Social connections:     Talks on phone: Not on file     Gets together: Not on file     Attends Sikh service: Not on file     Active member of club or organization: Not on file     Attends meetings of clubs or organizations: Not on file     Relationship status: Not on file   Other Topics Concern    Not on file   Social History Narrative    Not on file       OBJECTIVE:       Intake/Output Summary (Last 24 hours) at 1/15/2020 0957  Last data filed at 1/15/2020 0916  Gross per 24 hour   Intake 1020 ml   Output 2727 ml   Net -1707 ml     Vital Signs Range (Last 24H):  Temp:  [97.7 °F (36.5 °C)-99 °F (37.2 °C)]   Pulse:  []   Resp:  [17-20]   BP: (111-152)/(54-88)   SpO2:  [90 %-95 %]   Body mass index is 40.24 kg/m².    Objective:  General Appearance:  Comfortable, well-appearing, in no acute distress and not in pain.    Vital signs: (most recent): Blood pressure (!) 119/57, pulse (!) 111, temperature 98.2 °F (36.8 °C), temperature source Oral, resp. rate 17, height 5' 9" (1.753 m), weight 123.6 kg (272 lb 7.8 oz), SpO2 95 %.  No fever.    Output: Producing urine and producing stool.  "   HEENT: Normal HEENT exam.  (+JVD)    Lungs:  Normal effort.  Breath sounds clear to auscultation.  He is not in respiratory distress.  There are rales.  No wheezes.    Heart: Normal rate.  Regular rhythm.  S1 normal and S2 normal.  Positive for murmur.    Chest: Symmetric chest wall expansion.   Abdomen: Abdomen is soft.  Bowel sounds are normal.   There is no abdominal tenderness.     Extremities: Normal range of motion.  There is venous stasis and dependent edema.  There is no deformity, effusion or local swelling.    Pulses: Distal pulses are intact.    Neurological: Patient is alert and oriented to person, place and time.  Normal strength.    Pupils:  Pupils are equal, round, and reactive to light.    Skin:  Warm and dry.      Medications:  Medication list was reviewed in EPIC and changes noted under Assessment/Plan and MAR.    Laboratory:  Recent Labs     01/15/20  0328   WBC 9.76   RBC 2.61*   HGB 6.5*   HCT 22.9*      MCV 88   MCH 24.9*   MCHC 28.4*   GRAN 72.0  7.0   LYMPH 7.2*  0.7*   MONO 17.2*  1.7*   EOS 0.2      Recent Labs     01/15/20  0328   *      K 3.5   CL 95   CO2 31*   *   CREATININE 2.5*   CALCIUM 10.0   ANIONGAP 14   MG 2.4   PHOS 4.0     Prior records reviewed.    Imaging reviewed  Imaging Results          CT Chest Without Contrast (No Result on File)                X-Ray Chest AP Portable (Final result)  Result time 12/24/19 19:09:07    Final result by Zeb Hirsch MD (12/24/19 19:09:07)                 Impression:      Masslike opacity overlying the right lower lung zone, similar to prior exam.  Right pleural fluid not excluded.  Chest CT recommended for further evaluation of right lung masslike opacity.    Patchy bilateral pulmonary opacities, similar to prior exam.    Cardiomegaly.    Electronically signed by resident: Cristofer Reddy  Date:    12/24/2019  Time:    18:48    Electronically signed by: Zeb Hirsch MD  Date:    12/24/2019  Time:    19:09              Narrative:    EXAMINATION:  XR CHEST AP PORTABLE    CLINICAL HISTORY:  CHF;    TECHNIQUE:  Single frontal view of the chest was performed.    COMPARISON:  Chest radiograph 12/03/2019    FINDINGS:  Cardiac leads overlie the chest wall.    Redemonstration of extensive bilateral patchy opacities, more prominent on the right with a focal area of masslike opacity in the right lower lung zone.  No significant change from prior exam.  Left costophrenic angle is visible and right sided pleural fluid not excluded noting presence of opacification.  No pneumothorax.    The cardiac silhouette is enlarged.  Hilar and mediastinal contours are unchanged.    Bones are intact.                                  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 CKD [I13.0]  Yes    Elevated alkaline phosphatase level [R74.8]  Yes    Coronary artery disease [I25.10]  Yes     Chronic    Stage 3 chronic kidney disease [N18.3]  Yes    Subtherapeutic international normalized ratio (INR) [R79.1]  Yes    Personal history of cerebral embolism [Z86.79]  Not Applicable    Personal history of venous thrombosis and embolism [Z86.718]  Not Applicable    Personal history of MI (myocardial infarction) [I25.2]  Not Applicable    Essential hypertension [I10]  Yes     Chronic    Atrial fibrillation, chronic [I48.20]  Yes     Chronic    Chronic anticoagulation [Z79.01]  Not Applicable     Chronic    Cardiomyopathy [I42.9]  Yes     Chronic      Resolved Hospital Problems   No resolved problems to display.      Acute on Chronic Combined Systolic  and Diastolic Heart Failure  Cardiomyopathy  · CHF Pathway initiated  · Last 2D echo Nov 2019 with EF 25%  ·  and CXR with pulmonary edema and cardiomegaly  · Started on Lasix gtt, increased to 40 mg/hr. Failed transition to high dose IV Lasix pushes. Subsequently started back on Lasix gtt  · Changed Diuril to BID Metolazone 10 mg per Nephro recs  · Cont  gtt 2.5, which will be continued at home. Will need long-term IV placement when he nears discharge. PICC placement may be an issue given his renal failure   · Hold diuretics for today per nephrology  · Cardiology Co-mgmt consulted. Recommend Nephro consult for temporary HD for volume removal due to diuretic resistance.   · BID BMP (with morning labs and at 4pm)  · Strict I&Os with daily weights.  · Hold BB, okay to c/w BiDil  · Continue diuresis (Lasix gtt 40 mg/hr + metolazone 10 mg daily or twice daily) when not receiving dialysis for goal net negative 3 liters a day     JEAN-PIERRE on CKD3  · Baseline 1.8  · Cardiorenal   · BUN has been rising  · S/p HD session on 1/12. Continue diuresis/dialysis as clinically indicated     Chronic AFib  Long Term Use of Anticoagulants  Supratherapeutic INR  · Chronic issue  · HR in 100-120's  · INR initially subtherapeutic. Dose increased to 7.5mg. Then supratherapeutic so held coumadin. INR now therapeutic, continue 6 mg daily. Pharmacy consulted for dosing management     Essential HTN  · Chronic and stable  · Continue Bidil BID     CAD  History of MI  · Chronic and stable  · Continue Aspirin 81mg PO daily  · Continue Bidil BID     Anemia  · Received 5 units PRBCs on prior hospital admission  · Continue PO iron  · Type and screen obtained  · Monitor for bleeding/need for transfusion  · Transfused 1U pRBC after episode of epistaxis noted. Refusing further transfusions  · Suspect dilutional at this time  · Patient does agree for 1U pRBC in anticipation of procedure 1/16     History of VTE  History of Stroke  · Chronic and  stable  · Continue Aspirin 81mg PO daily  · Continue Warfarin when INR appropriate      Elevated Alk Phos  · Chronic and stable  · Monitor     Benign Hypertensive Heart and Kidney Disease with HF and CKD  · As above     Abnormal CT  · Needs CT chest when able to lie flat or outpt to evaluate masslike opacity seen on CXR     Diet:  Low Sodium, 1 L fluid restriction  VTE PPx:  Warfarin  Goals of Care:  Full     High Risk Conditions:   Patient is currently on drug therapy requiring intensive monitoring for toxicity: Lasix gtt    Anticipated discharge date and disposition:   Pending volume removal/diuresis. Home hospice on discharge. Pt w/ very poor insight into disease process and will likely continue to be readmitted to the hospital    Markus Howard MD  [facility] Medicine

## 2020-01-15 NOTE — PROGRESS NOTES
Patient was on waiting list for home INR meter and after reviewing his chart this patient is not appropriate for home INR meter use.  Patient has still not scheduled appointment to establish care with an [facility] PCP to be responsible for him in the Coumadin Clinic after numerous times being instructed to do so by clinic staff and 11/1/19 he was discharged from [facility] for noncompliance.

## 2020-01-15 NOTE — SUBJECTIVE & OBJECTIVE
Interval History: Patient seen and examined at bedside, no acute events overnight. Dialyzed yesterday for 2.5 hours, 500 cc of UF. Will need catheter replacement.     Review of patient's allergies indicates:   Allergen Reactions    Acetaminophen      Itching    Oxycodone-acetaminophen      Other reaction(s): Itching    Ace inhibitors Other (See Comments)     cough     Current Facility-Administered Medications   Medication Frequency    acetaminophen tablet 650 mg Q4H PRN    aspirin EC tablet 81 mg Daily    dextrose 10% (D10W) Bolus PRN    dextrose 10% (D10W) Bolus PRN    diphenhydrAMINE capsule 25 mg Q6H PRN    DOBUTamine 500mg in D5W 250mL infusion (premix) (NON-TITRATING) Continuous    ferrous sulfate EC tablet 325 mg Daily    furosemide (LASIX) 2 mg/mL in sodium chloride 0.9% 100 mL infusion (conc: 2 mg/mL) Continuous    glucagon (human recombinant) injection 1 mg PRN    glucose chewable tablet 16 g PRN    glucose chewable tablet 24 g PRN    isosorbide-hydrALAZINE 20-37.5 mg per tablet 1 tablet BID    melatonin tablet 6 mg Nightly PRN    methocarbamol tablet 500 mg TID PRN    methyl salicylate-menthol 15-10% cream TID PRN    metOLazone tablet 10 mg Daily    morphine injection 4 mg Q6H PRN    ondansetron injection 8 mg Q8H PRN    oxyCODONE immediate release tablet 15 mg Q4H PRN    promethazine (PHENERGAN) 25 mg in dextrose 5 % 50 mL IVPB Q6H PRN    senna-docusate 8.6-50 mg per tablet 1 tablet BID PRN    sodium chloride 0.65 % nasal spray 1 spray PRN    warfarin (COUMADIN) tablet 6 mg Daily       Objective:     Vital Signs (Most Recent):  Temp: 98.2 °F (36.8 °C) (01/15/20 0800)  Pulse: 102 (01/15/20 0800)  Resp: 16 (01/15/20 0800)  BP: 113/62 (01/15/20 0800)  SpO2: 95 % (01/15/20 0800)  O2 Device (Oxygen Therapy): room air (01/15/20 0800) Vital Signs (24h Range):  Temp:  [97.7 °F (36.5 °C)-99 °F (37.2 °C)] 98.2 °F (36.8 °C)  Pulse:  [] 102  Resp:  [16-20] 16  SpO2:  [90 %-95 %] 95  %  BP: (111-152)/(54-88) 113/62     Weight: 123.6 kg (272 lb 7.8 oz) (01/15/20 0600)  Body mass index is 40.24 kg/m².  Body surface area is 2.45 meters squared.    I/O last 3 completed shifts:  In: 1860 [P.O.:1560; Other:300]  Out: 4302 [Urine:3475; Other:827]    Physical Exam   Constitutional: He is oriented to person, place, and time. No distress.   Sick appearing male   HENT:   Head: Normocephalic and atraumatic.   Right Ear: External ear normal.   Left Ear: External ear normal.   Nose: Nose normal.   Eyes: Pupils are equal, round, and reactive to light. EOM are normal. No scleral icterus.   Neck: JVD present. No tracheal deviation present.   Cardiovascular: Normal rate, regular rhythm and intact distal pulses. Exam reveals gallop.   Pulses:       Radial pulses are 2+ on the right side, and 2+ on the left side.   Pulmonary/Chest: Effort normal. No stridor. He has no wheezes. He has rales (bibasilar).   Abdominal: Soft. There is no tenderness. There is no guarding.   Musculoskeletal: He exhibits edema and tenderness.   Neurological: He is alert and oriented to person, place, and time.   asterixis   Skin: Skin is warm and dry. Rash (chronic venous stasis ulceration to the lower extremities bilaterally) noted. He is not diaphoretic.   Psychiatric: He has a normal mood and affect. His behavior is normal.   Nursing note and vitals reviewed.      Significant Labs:  CBC:   Recent Labs   Lab 01/15/20  0328   WBC 9.76   RBC 2.61*   HGB 6.5*   HCT 22.9*      MCV 88   MCH 24.9*   MCHC 28.4*     CMP:   Recent Labs   Lab 01/15/20  0328   *   CALCIUM 10.0   ALBUMIN 3.3*   PROT 8.6*      K 3.5   CO2 31*   CL 95   *   CREATININE 2.5*   ALKPHOS 185*   ALT 16   AST 24   BILITOT 1.8*     All labs within the past 24 hours have been reviewed.     Significant Imaging:  Labs: Reviewed

## 2020-01-15 NOTE — PROGRESS NOTES
Primary team requesting Trialysis catheter exchange, per Nephrology recommendations, given they are getting insufficient flows with current CVC in right IJ. I went to Mr. Terrell's room to discuss the catheter exchange, however the patient declined the procedure at this time. He also declined signing consent for any future catheter exchange, nor did he want to discuss the procedure in general. I discussed this with Dr. Howard, who will revisit the patient and discuss the catheter exchange.     Quentin Vicente MD  [facility]  PGY-3  Ext: [phone]

## 2020-01-15 NOTE — PLAN OF CARE
gtt maintained per orders. Lasix gtt initiated and maintained per orders; UOP measures closely. Metolazone given per orders also. Had a few runs of vtach; MD aware. Complaints of pain treated with PRN medications. Experienced nose bleed during shift, doctor notified. Plan is to continue diureses and volume removal. DC home with hospice when medically ready. VSS. Pt denies SOB or chest pain. Pt remain on telemetry; afib on telemetry. Fall precautions maintained. Pt free of falls and injuries. POC discussed with patient/family, verbalized understanding. Questions answered, no distress at present.

## 2020-01-15 NOTE — SUBJECTIVE & OBJECTIVE
Interval History: Patient has remained on  gtt at 2.5 mcg/kg since admission. He initially received several doses of IV Diuril but diuresis was limited. He responded well after Lasix gtt was increased to 30 mg/hr. Lasix gtt was stopped and patient was started on Lasix IVP on 1/3 but he began to worsen symptomatically. He received IV Diuril on 1/4 and was started back on Lasix gtt on 1/5. He has had recurrent epitaxis required 1 unit PRBC transfusion on 1/4 after Hgb dropped to 6.8. He again received Diuril on 1/6 and 1/7.    Patient agreed to temporary dialysis for volume removal and had a CVC placed on 1/11. He had his first HD session on 1/12. He was supposed to have HD again on 1/13 but line was clotted. TPA instilled in each port of HD catheter on the morning of 1/14 and HD completed that evening. Needs CVC replaced due to insufficient flow, initially refused replacement earlier today but has since agreed.    Past Medical History:   Diagnosis Date    Anticoagulant long-term use     Cardiomegaly     Chronic combined systolic and diastolic congestive heart failure     Coronary artery disease     Heart attack 2006    Hypertension     Hyperthyroidism, subclinical 1/2/2013    MI (myocardial infarction) 9/22/2013    MI in 2009      Paroxysmal atrial fibrillation     PE (pulmonary embolism) 1/1/2013    IN 2010     S/P ablation of atrial flutter 2008    Stroke 2009    no residual weaknesses       Past Surgical History:   Procedure Laterality Date    COLONOSCOPY N/A 12/2/2019    Procedure: COLONOSCOPY;  Surgeon: Scott Rosario MD;  Location: [address]);  Service: Endoscopy;  Laterality: N/A;    ESOPHAGOGASTRODUODENOSCOPY N/A 12/2/2019    Procedure: EGD (ESOPHAGOGASTRODUODENOSCOPY);  Surgeon: Scott Rosario MD;  Location: [facility] ([address] [place] [address]);  Service: Endoscopy;  Laterality: N/A;    RADIOFREQUENCY ABLATION  01/08/2008    for atrial flutter       Review of patient's allergies indicates:    Allergen Reactions    Acetaminophen      Itching    Oxycodone-acetaminophen      Other reaction(s): Itching    Ace inhibitors Other (See Comments)     cough       Medications:  Continuous Infusions:   DOBUTamine 2.5 mcg/kg/min (01/14/20 2221)     Scheduled Meds:   aspirin  81 mg Oral Daily    ferrous sulfate  325 mg Oral Daily    isosorbide-hydrALAZINE 20-37.5 mg  1 tablet Oral BID    warfarin  7 mg Oral Daily     PRN Meds:sodium chloride, acetaminophen, Dextrose 10% Bolus, Dextrose 10% Bolus, diphenhydrAMINE, glucagon (human recombinant), glucose, glucose, melatonin, methocarbamol, methyl salicylate-menthol 15-10%, morphine, ondansetron, oxyCODONE, promethazine (PHENERGAN) IVPB, senna-docusate 8.6-50 mg, sodium chloride    Family History     Problem Relation (Age of Onset)    Alcohol abuse Father    Hypertension Mother, Father, Sister, Brother    Stroke Mother        Tobacco Use    Smoking status: Never Smoker    Smokeless tobacco: Never Used   Substance and Sexual Activity    Alcohol use: No    Drug use: No    Sexual activity: Never       Review of Systems   Constitutional: Negative for appetite change and fatigue.   HENT: Negative for sore throat and trouble swallowing.    Respiratory: Positive for shortness of breath. Negative for cough and wheezing.    Cardiovascular: Positive for leg swelling. Negative for chest pain.   Gastrointestinal: Positive for constipation and nausea. Negative for diarrhea and vomiting.   Genitourinary: Negative for dysuria and hematuria.   Musculoskeletal: Positive for arthralgias (hips) and back pain. Negative for neck pain.   Skin: Positive for wound (RLE). Negative for rash.   Neurological: Negative for dizziness and light-headedness.   Psychiatric/Behavioral: Negative for confusion and dysphoric mood. The patient is not nervous/anxious.      Objective:     Vital Signs (Most Recent):  Temp: 97.8 °F (36.6 °C) (01/15/20 1609)  Pulse: 87 (01/15/20 1609)  Resp: 17  (01/15/20 1609)  BP: (!) 133/50 (01/15/20 1609)  SpO2: (!) 94 % (01/15/20 1609) Vital Signs (24h Range):  Temp:  [97.7 °F (36.5 °C)-98.2 °F (36.8 °C)] 97.8 °F (36.6 °C)  Pulse:  [] 87  Resp:  [16-20] 17  SpO2:  [90 %-95 %] 94 %  BP: (111-152)/(50-88) 133/50     Weight: 123.6 kg (272 lb 7.8 oz)  Body mass index is 40.24 kg/m².    Review of Symptoms  Symptom Assessment (ESAS 0-10 scale)   ESAS 0 1 2 3 4 5 6 7 8 9 10   Pain X             Dyspnea  X            Anxiety X             Nausea X             Depression  X             Anorexia X             Fatigue X             Insomnia X             Restlessness  X             Agitation X             CAM / Delirium _X_ --  ___+   Constipation     __ --  _X_+   Diarrhea           _X_ --  ___+  Bowel Management Plan (BMP): Yes    Comments: Senna-Docusate ordered BID PRN    Pain Assessment: denies pain at this time; reports intermittent shooting pain to bilateral hips, legs and back that worsens with movement or activity and improves with Oxycodone      OME in 24 hours: 30    Performance Status: 50    Physical Exam   Constitutional: He is oriented to person, place, and time. He appears well-developed and well-nourished.   On  gtt and Lasix gtt   HENT:   Head: Normocephalic and atraumatic.   Eyes: Conjunctivae and EOM are normal.   Neck: Normal range of motion. Neck supple.   Cardiovascular: Normal rate and regular rhythm.   Pulmonary/Chest: Effort normal. No respiratory distress.   Abdominal: Soft. There is no tenderness.   Musculoskeletal: Normal range of motion. He exhibits edema.   Neurological: He is alert and oriented to person, place, and time.   Skin: Skin is warm and dry.   Dressing to RLE ulceration   Psychiatric: His behavior is normal. Thought content normal. Cognition and memory are normal.   Vitals reviewed.      Significant Labs: All pertinent labs within the past 24 hours have been reviewed.  CBC:   Recent Labs   Lab 01/15/20  0328   WBC 9.76   HGB 6.5*    HCT 22.9*   MCV 88        BMP:  Recent Labs   Lab 01/15/20  0328   *      K 3.5   CL 95   CO2 31*   *   CREATININE 2.5*   CALCIUM 10.0   MG 2.4     LFT:  Lab Results   Component Value Date    AST 24 01/15/2020     (H) 2019    ALKPHOS 185 (H) 01/15/2020    BILITOT 1.8 (H) 01/15/2020     Albumin:   Albumin   Date Value Ref Range Status   01/15/2020 3.3 (L) 3.5 - 5.2 g/dL Final     Protein:   Total Protein   Date Value Ref Range Status   01/15/2020 8.6 (H) 6.0 - 8.4 g/dL Final     Lactic acid:   Lab Results   Component Value Date    LACTATE 1.0 2019    LACTATE 1.0 2019       Significant Imaging: I have reviewed all pertinent imaging results/findings within the past 24 hours.    Advance Care Planning   Advanced Directives::  Living Will: No  LaPOST: No  Do Not Resuscitate Status: No  Medical Power of : No. Patient identifies brother Kris [place] ([phone]) as his surrogate decision maker.    Decision-Making Capacity: Patient answered questions       Living Arrangements: Lives alone    Psychosocial/Cultural:  Patient is unmarried and has no children. His parents are . He has 5 siblings and a lot of cousins and friends. He lives alone in [place] but says his brother Kris lives right next to him. 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 He says that his goal is to get healthy. He worries about dying. He does not like to talk about the future and prefers to take things day by day.    Spiritual:     F- Melina and Belief: does not identify with a particular Anabaptist but prays to God    I - Importance: somewhat important  .  C - Community: does not belong to a Buddhist    A - Address in Care:  visits

## 2020-01-15 NOTE — PLAN OF CARE
Plan of care discussed with patient. Patient is free of fall/trauma/injury. Denies CP, SOB, or pain/discomfort. Patient had dialysis today they removed half a liter. Electrolytes replaced. All questions addressed. Will continue to monitor

## 2020-01-15 NOTE — PROGRESS NOTES
[facility]  Nephrology  Progress Note    Patient Name: Hamlet Terrell  MRN: [id number]  Admission Date: 12/24/2019  Hospital Length of Stay: 22 days  Attending Provider: Markus Howard MD   Primary Care Physician: Carlin Swift MD  Principal Problem:Acute on chronic combined systolic and diastolic congestive heart failure    Subjective:     HPI: The pt is a 53 yo M with chronic combined HF (EF 25% on 11/27), atrial fibrillation on chronic AC (coumadin), CAD, CVA, and history of PE that presented to the ED with SOB & edema. Nephrology was consulted for assistance with management of cardiorenal syndrome in the setting of JEAN-PIERRE on CKD III now resistive to diuretic management. Baseline Cr appears to be 1.6-1.9. Cr elevated to 3.1 at time of consult. Of note, he was recently admitted to Hospital Medicine from 11/24-12/8 for a CHF exacerbation.  During that admission, he was evaluated by Cardiology who started him on Dobutamine and Diuril.  He was DC'ed home on Lasix 80 mg po BID.  He reports that he was compliant with this regimen, but despite this, he had continued to gain weight, have lower extremity and abdominal swelling, as well as shortness of breath. He was discharged with a weight of 259lbs, and claims that is around his dry weight. He is currently 283 lbs.       Per Dr. Goins:   Patient started on IV diuretics and  infusion for diagnosis of acute on chronic combined heart failure. His weight was stagnant and UOP tapered, so he was transitioned to IVP lasix for IV lasix gtt as this was actually effective during prior admission under guidance from co-management cardiology. However, the patient actually worsened symptomatically and TBili increased along with Cr and weight increase. The patient is now started back on lasix infusion with diuril augmentation. The patient's course is also c/b epistaxis, possibly related to his decompensated picture. He has required pRBC transfusion. He refuses to  apply pressure to the bridge of his nose as he says he cannot breathe through his mouth.  The patient's course is also c/b fever, likely due to pRBC transfusion.     Cardiology consulted for diuretic resistance. Despite two-week hospital stay thus far, he is only down 3 lbs. Nephrology consulted for acute renal failure and consideration of temporary HD for volume removal. Pt continues to exhibit poor insight into his disease. His wishes (to get healthy, ride his bike, walk to the store, return to the hospital if needed) is not on par with hospice. He reported similar feelings to Palliative Care yesterday. Discussed with Palliative Care provider. He is however now amenable for home dobutamine. PICC placement will likely be an issue given his current renal failure. Hb 6.9 secondary to renal failure; pt would like to hold off on transfusion today.    Interval History: Patient seen and examined at bedside, no acute events overnight. Dialyzed yesterday for 2.5 hours, 500 cc of UF. Will need catheter replacement.     Review of patient's allergies indicates:   Allergen Reactions    Acetaminophen      Itching    Oxycodone-acetaminophen      Other reaction(s): Itching    Ace inhibitors Other (See Comments)     cough     Current Facility-Administered Medications   Medication Frequency    acetaminophen tablet 650 mg Q4H PRN    aspirin EC tablet 81 mg Daily    dextrose 10% (D10W) Bolus PRN    dextrose 10% (D10W) Bolus PRN    diphenhydrAMINE capsule 25 mg Q6H PRN    DOBUTamine 500mg in D5W 250mL infusion (premix) (NON-TITRATING) Continuous    ferrous sulfate EC tablet 325 mg Daily    furosemide (LASIX) 2 mg/mL in sodium chloride 0.9% 100 mL infusion (conc: 2 mg/mL) Continuous    glucagon (human recombinant) injection 1 mg PRN    glucose chewable tablet 16 g PRN    glucose chewable tablet 24 g PRN    isosorbide-hydrALAZINE 20-37.5 mg per tablet 1 tablet BID    melatonin tablet 6 mg Nightly PRN    methocarbamol  tablet 500 mg TID PRN    methyl salicylate-menthol 15-10% cream TID PRN    metOLazone tablet 10 mg Daily    morphine injection 4 mg Q6H PRN    ondansetron injection 8 mg Q8H PRN    oxyCODONE immediate release tablet 15 mg Q4H PRN    promethazine (PHENERGAN) 25 mg in dextrose 5 % 50 mL IVPB Q6H PRN    senna-docusate 8.6-50 mg per tablet 1 tablet BID PRN    sodium chloride 0.65 % nasal spray 1 spray PRN    warfarin (COUMADIN) tablet 6 mg Daily       Objective:     Vital Signs (Most Recent):  Temp: 98.2 °F (36.8 °C) (01/15/20 0800)  Pulse: 102 (01/15/20 0800)  Resp: 16 (01/15/20 0800)  BP: 113/62 (01/15/20 0800)  SpO2: 95 % (01/15/20 0800)  O2 Device (Oxygen Therapy): room air (01/15/20 0800) Vital Signs (24h Range):  Temp:  [97.7 °F (36.5 °C)-99 °F (37.2 °C)] 98.2 °F (36.8 °C)  Pulse:  [] 102  Resp:  [16-20] 16  SpO2:  [90 %-95 %] 95 %  BP: (111-152)/(54-88) 113/62     Weight: 123.6 kg (272 lb 7.8 oz) (01/15/20 0600)  Body mass index is 40.24 kg/m².  Body surface area is 2.45 meters squared.    I/O last 3 completed shifts:  In: 1860 [P.O.:1560; Other:300]  Out: 4302 [Urine:3475; Other:827]    Physical Exam   Constitutional: He is oriented to person, place, and time. No distress.   Sick appearing male   HENT:   Head: Normocephalic and atraumatic.   Right Ear: External ear normal.   Left Ear: External ear normal.   Nose: Nose normal.   Eyes: Pupils are equal, round, and reactive to light. EOM are normal. No scleral icterus.   Neck: JVD present. No tracheal deviation present.   Cardiovascular: Normal rate, regular rhythm and intact distal pulses. Exam reveals gallop.   Pulses:       Radial pulses are 2+ on the right side, and 2+ on the left side.   Pulmonary/Chest: Effort normal. No stridor. He has no wheezes. He has rales (bibasilar).   Abdominal: Soft. There is no tenderness. There is no guarding.   Musculoskeletal: He exhibits edema and tenderness.   Neurological: He is alert and oriented to person,  place, and time.   asterixis   Skin: Skin is warm and dry. Rash (chronic venous stasis ulceration to the lower extremities bilaterally) noted. He is not diaphoretic.   Psychiatric: He has a normal mood and affect. His behavior is normal.   Nursing note and vitals reviewed.      Significant Labs:  CBC:   Recent Labs   Lab 01/15/20  0328   WBC 9.76   RBC 2.61*   HGB 6.5*   HCT 22.9*      MCV 88   MCH 24.9*   MCHC 28.4*     CMP:   Recent Labs   Lab 01/15/20  0328   *   CALCIUM 10.0   ALBUMIN 3.3*   PROT 8.6*      K 3.5   CO2 31*   CL 95   *   CREATININE 2.5*   ALKPHOS 185*   ALT 16   AST 24   BILITOT 1.8*     All labs within the past 24 hours have been reviewed.     Significant Imaging:  Labs: Reviewed    Assessment/Plan:     * Acute on chronic combined systolic and diastolic congestive heart failure  27 yo male with HFrEF (25%), CKD 3, and multiple hospital admissions for heart failure exacerbations presenting this admission for acute on chronic heart failure. Despite aggressive diuresis with Lasix 120mg TID, decreased urinary output. He was later transitioned to lasix gtt at 30 mg/hr with multiple doses of diuril without improvement in UPO. Nephrology consult for volume overload refractory to diuresis.    Plan/Recommendation:  - on dobutamine gtt as per cardiology            Cardiorenal syndrome  Plan/Recommendation:  - cont HD For volume removal   - Would recommend continued medical management   - Continue to monitor strict In/outs, daily weights  - Will continue to follow pending discussions between patient and palliative care/hospice            Stage 3 chronic kidney disease  Mr. Terrell is a 55 y/o male with HFrEF (last 2D ECHO EF 23%, moderate MR, moderate TR, diastolic dysfunction), hypertension, chronic atrial fibrillation, a flutter status post ablation, CVA in 2009, coronary artery disease, CKD III, and obstructive sleep apnea presents with ADCHF. Nephro consulted for JEAN-PIERRE on CKD in  the setting of Cardiorenal syndrome.   - JEAN-PIERRE was thought to be to be multifactorial from labile BP, and component of cardiorenal syndrome  - Creatinine up trending since admission, baseline 1.6-1.9, currently 3.0   - Azotemia worsening daily. No uremic symptoms present.     Plan/Rec  - will need line replacement due to poor flow, primary team made aware  - recommend holding diuretics at this time  - Strict I/O and chart  - Avoid nephrotoxic medications, NSAIDs, IV contrast, etc  - Daily weights and chart  - Medication doses adjusted to GFR  - maintaining good urine output at 2.0 liters over last 24 hours  - Will follow closely        Thank you for your consult. I will follow-up with patient. Please contact us if you have any additional questions.    Pankaj Finley MD  Nephrology  [facility]

## 2020-01-15 NOTE — NURSING
"Blood ready in blood bank, however patient stated " he is NOT ready for blood now". On call MD Davis notified about satement from patient. MD davis stated " that's okay, whenever he is ready later today". Patient instructed to call/ notify nurse when he is ready.   "

## 2020-01-15 NOTE — PROGRESS NOTES
Completed dialysis treatment of 2.5 hours. Catheter flushed with NS and secured with tape.  Pt tolerated without difficulty.  Net  Removal 500cc,  BP remains stable thru out treatment.  Pt had no complaints.  Report given to Alisha.  Unable to achieve full rate of 250 due to catheter not functioning well.

## 2020-01-15 NOTE — CHAPLAIN
"20 min visit w/pt. He was noticeably calmer, sleepy, eyes closed a lot while he was talking; not nearly as angry. I commented to him he seemed calmer today compared to the other day and he grunted in agreement. Asked why that might be and he said his brother called him this morning-- Kris who is in [place] and will visit pt next weekend. He said his other brother and sister called as well. A LOT of long pauses in between segments of conversation, as  He seemingly dozed off, but most likely thinking.    Out of the blue he said, "wanna know the truth? Complete honesty?" After I said yes he said, "I'm glad I'm alive." I asked what he'd be doing if he were [place] and [place] He really wants to go home; "I can handle being sick, I'm just tired of this place."     Asked him what's most important to him, of value, gives him hope (had to ask in a few different ways) and he said fairly quickly, "my brothers and sisters....and my friends."     Without prompting he asked me to pray for him and reached for my hand. As always I asked him what specifically he'd like us to pray and he usually says, "healing", but this time he said, "just pray". My prayer was mostly about peace, calmness, patience, love and reduction of pain and discomfort... After I said amen, he continued to hold my hand for a moment and then HE said AMEN.    As I was leaving he said, "I'm going to make sure I have a good day today." I said that sounds like a good plan. The one thing you can control is your attitude and response to things, so it's up to you.    Told him I'd visit again. It seems as though he may be turning a corner regarding his own mortality and prognosis, but we will see. My pastoral assessment is that his anger inside was about not being in control and not thinking his family and friends even care.  Prayers and visits will continue.  "

## 2020-01-16 LAB
ALBUMIN SERPL BCP-MCNC: 3.3 G/DL (ref 3.5–5.2)
ALP SERPL-CCNC: 195 U/L (ref 55–135)
ALT SERPL W/O P-5'-P-CCNC: 16 U/L (ref 10–44)
ANION GAP SERPL CALC-SCNC: 15 MMOL/L (ref 8–16)
ANION GAP SERPL CALC-SCNC: 16 MMOL/L (ref 8–16)
ANISOCYTOSIS BLD QL SMEAR: SLIGHT
AST SERPL-CCNC: 24 U/L (ref 10–40)
BASOPHILS # BLD AUTO: 0.03 K/UL (ref 0–0.2)
BASOPHILS NFR BLD: 0.2 % (ref 0–1.9)
BILIRUB SERPL-MCNC: 3.1 MG/DL (ref 0.1–1)
BUN SERPL-MCNC: 107 MG/DL (ref 6–20)
BUN SERPL-MCNC: 113 MG/DL (ref 6–20)
CALCIUM SERPL-MCNC: 9.8 MG/DL (ref 8.7–10.5)
CALCIUM SERPL-MCNC: 9.9 MG/DL (ref 8.7–10.5)
CHLORIDE SERPL-SCNC: 94 MMOL/L (ref 95–110)
CHLORIDE SERPL-SCNC: 96 MMOL/L (ref 95–110)
CO2 SERPL-SCNC: 29 MMOL/L (ref 23–29)
CO2 SERPL-SCNC: 32 MMOL/L (ref 23–29)
CREAT SERPL-MCNC: 2.7 MG/DL (ref 0.5–1.4)
CREAT SERPL-MCNC: 2.8 MG/DL (ref 0.5–1.4)
DACRYOCYTES BLD QL SMEAR: ABNORMAL
DIFFERENTIAL METHOD: ABNORMAL
EOSINOPHIL # BLD AUTO: 0.3 K/UL (ref 0–0.5)
EOSINOPHIL NFR BLD: 1.7 % (ref 0–8)
ERYTHROCYTE [DISTWIDTH] IN BLOOD BY AUTOMATED COUNT: 21.5 % (ref 11.5–14.5)
EST. GFR  (AFRICAN AMERICAN): 28.5 ML/MIN/1.73 M^2
EST. GFR  (AFRICAN AMERICAN): 29.8 ML/MIN/1.73 M^2
EST. GFR  (NON AFRICAN AMERICAN): 24.6 ML/MIN/1.73 M^2
EST. GFR  (NON AFRICAN AMERICAN): 25.7 ML/MIN/1.73 M^2
GLUCOSE SERPL-MCNC: 120 MG/DL (ref 70–110)
GLUCOSE SERPL-MCNC: 136 MG/DL (ref 70–110)
HCT VFR BLD AUTO: 23.4 % (ref 40–54)
HGB BLD-MCNC: 6.8 G/DL (ref 14–18)
HYPOCHROMIA BLD QL SMEAR: ABNORMAL
IMM GRANULOCYTES # BLD AUTO: 0.12 K/UL (ref 0–0.04)
IMM GRANULOCYTES NFR BLD AUTO: 0.8 % (ref 0–0.5)
INR PPP: 2 (ref 0.8–1.2)
LYMPHOCYTES # BLD AUTO: 0.6 K/UL (ref 1–4.8)
LYMPHOCYTES NFR BLD: 4 % (ref 18–48)
MAGNESIUM SERPL-MCNC: 2.3 MG/DL (ref 1.6–2.6)
MCH RBC QN AUTO: 25.7 PG (ref 27–31)
MCHC RBC AUTO-ENTMCNC: 29.1 G/DL (ref 32–36)
MCV RBC AUTO: 88 FL (ref 82–98)
MONOCYTES # BLD AUTO: 2 K/UL (ref 0.3–1)
MONOCYTES NFR BLD: 13.7 % (ref 4–15)
NEUTROPHILS # BLD AUTO: 11.9 K/UL (ref 1.8–7.7)
NEUTROPHILS NFR BLD: 79.6 % (ref 38–73)
NRBC BLD-RTO: 0 /100 WBC
OVALOCYTES BLD QL SMEAR: ABNORMAL
PHOSPHATE SERPL-MCNC: 4.1 MG/DL (ref 2.7–4.5)
PLATELET # BLD AUTO: 265 K/UL (ref 150–350)
PLATELET BLD QL SMEAR: ABNORMAL
PMV BLD AUTO: 10 FL (ref 9.2–12.9)
POIKILOCYTOSIS BLD QL SMEAR: SLIGHT
POLYCHROMASIA BLD QL SMEAR: ABNORMAL
POTASSIUM SERPL-SCNC: 3.3 MMOL/L (ref 3.5–5.1)
POTASSIUM SERPL-SCNC: 3.7 MMOL/L (ref 3.5–5.1)
PROT SERPL-MCNC: 8.4 G/DL (ref 6–8.4)
PROTHROMBIN TIME: 18.8 SEC (ref 9–12.5)
RBC # BLD AUTO: 2.65 M/UL (ref 4.6–6.2)
SODIUM SERPL-SCNC: 141 MMOL/L (ref 136–145)
SODIUM SERPL-SCNC: 141 MMOL/L (ref 136–145)
TARGETS BLD QL SMEAR: ABNORMAL
WBC # BLD AUTO: 14.94 K/UL (ref 3.9–12.7)

## 2020-01-16 PROCEDURE — 36415 COLL VENOUS BLD VENIPUNCTURE: CPT

## 2020-01-16 PROCEDURE — 36430 TRANSFUSION BLD/BLD COMPNT: CPT

## 2020-01-16 PROCEDURE — 83735 ASSAY OF MAGNESIUM: CPT

## 2020-01-16 PROCEDURE — 80053 COMPREHEN METABOLIC PANEL: CPT

## 2020-01-16 PROCEDURE — 25000003 PHARM REV CODE 250: Performed by: HOSPITALIST

## 2020-01-16 PROCEDURE — 85610 PROTHROMBIN TIME: CPT

## 2020-01-16 PROCEDURE — 63600175 PHARM REV CODE 636 W HCPCS: Performed by: HOSPITALIST

## 2020-01-16 PROCEDURE — 85025 COMPLETE CBC W/AUTO DIFF WBC: CPT

## 2020-01-16 PROCEDURE — 99233 SBSQ HOSP IP/OBS HIGH 50: CPT | Mod: ,,, | Performed by: HOSPITALIST

## 2020-01-16 PROCEDURE — 99232 PR SUBSEQUENT HOSPITAL CARE,LEVL II: ICD-10-PCS | Mod: ,,, | Performed by: INTERNAL MEDICINE

## 2020-01-16 PROCEDURE — 99232 SBSQ HOSP IP/OBS MODERATE 35: CPT | Mod: ,,, | Performed by: INTERNAL MEDICINE

## 2020-01-16 PROCEDURE — 20600001 HC STEP DOWN PRIVATE ROOM

## 2020-01-16 PROCEDURE — 99233 PR SUBSEQUENT HOSPITAL CARE,LEVL III: ICD-10-PCS | Mod: ,,, | Performed by: HOSPITALIST

## 2020-01-16 PROCEDURE — 80048 BASIC METABOLIC PNL TOTAL CA: CPT

## 2020-01-16 PROCEDURE — 84100 ASSAY OF PHOSPHORUS: CPT

## 2020-01-16 RX ORDER — POTASSIUM CHLORIDE 20 MEQ/15ML
40 SOLUTION ORAL ONCE
Status: COMPLETED | OUTPATIENT
Start: 2020-01-16 | End: 2020-01-16

## 2020-01-16 RX ADMIN — FERROUS SULFATE TAB EC 325 MG (65 MG FE EQUIVALENT) 325 MG: 325 (65 FE) TABLET DELAYED RESPONSE at 10:01

## 2020-01-16 RX ADMIN — WARFARIN SODIUM 7 MG: 5 TABLET ORAL at 10:01

## 2020-01-16 RX ADMIN — POTASSIUM CHLORIDE 40 MEQ: 20 SOLUTION ORAL at 10:01

## 2020-01-16 RX ADMIN — HYDRALAZINE HYDROCHLORIDE AND ISOSORBIDE DINITRATE 1 TABLET: 37.5; 2 TABLET, FILM COATED ORAL at 10:01

## 2020-01-16 RX ADMIN — DOBUTAMINE IN DEXTROSE 2.5 MCG/KG/MIN: 200 INJECTION, SOLUTION INTRAVENOUS at 03:01

## 2020-01-16 RX ADMIN — ASPIRIN 81 MG: 81 TABLET, COATED ORAL at 10:01

## 2020-01-16 RX ADMIN — OXYCODONE HYDROCHLORIDE 15 MG: 10 TABLET ORAL at 07:01

## 2020-01-16 RX ADMIN — OXYCODONE HYDROCHLORIDE 15 MG: 10 TABLET ORAL at 05:01

## 2020-01-16 NOTE — PROGRESS NOTES
[facility]  Palliative Medicine  Progress Note    Patient Name: Hamlet Terrell  MRN: [id number]  Admission Date: 2019  Hospital Length of Stay: 23 days  Code Status: Full Code   Attending Provider: Markus Howard MD  Consulting Provider: Vira Davila NP  Primary Care Physician: Carlin Swift MD  Principal Problem:Acute on chronic combined systolic and diastolic congestive heart failure    Patient information was obtained from patient, past medical records and Dr. Howard.      Assessment/Plan:     Palliative care encounter  Palliative care consulted for goals of care discussion/advanced care planning for this 55 y/o male being followed by hospital medicine for Acute on Chronic Combined Systolic and Diastolic Heart Failure, Cardiomyopathy, JEAN-PIERRE on CKD3, Chronic AFib, Essential HTN, CAD, Anemia, Elevated Alk Phos, Abnormal CT, History of MI, History of VTE and History of Stroke. Rounded on patient today, no family members at bedside. Patient is alert and oriented but very quiet. He has completed two dialysis treatments so far. Needs CVC replaced due to insufficient flow, initially refused replacement earlier today but has since agreed.    Plan/Recommendations:  1. See goals of care discussion below.  2. Palliative care will follow up with patient.    Goals of Care/Advance Care Plannin/15 Rounded on patient today, no family members at bedside. Patient is alert and oriented but very quiet. He has completed two dialysis treatments so far. Needs CVC replaced due to insufficient flow, initially refused replacement earlier today but has since agreed. Patient is fine with temporary dialysis but still says that he would not want dialysis long term. Asked patient to consider how long he wants to continue with dialysis but he just sat quietly with his eyes closed and did not respond. Attempted to talk about patient's understanding of his prognosis and whether he would prefer to spend his  "time at home or in the hospital given that his condition is not curable. Patient continued to sit quietly with eyes closed, not talking. Asked patient whether he was awake and understood what NP was saying and he said yes. Asked for his thoughts and he did not respond. Encouraged patient to take some time to think, will follow up.    1/14 Rounded on patient today, no family members at bedside. Patient is alert and oriented but not very talkative and slow to respond to questions. He is agreeable to another dialysis session this afternoon but continues to say that he would not want HD long term. Patient's goal is still to "get healthy," again explained that his condition is not curable but symptoms can be managed with medications. He continues to have poor insight into his condition and is not receptive to discussions regarding prognosis. Tried to discuss what the future may look like but patient says he is not thinking about the future and wants to take things day by day. He is aware that home hospice is available to him and would allow him to stay out of the hospital after discharge. Patient has advanced directives packet at bedside but has not yet looked at it, encouraged him to read through it tonight. Will continue to follow up with patient.    1/13 Rounded on patient today, no family members at bedside. Patient is alert, oriented, and cooperative with visit. He has been started on temporary dialysis for volume removal with first dialysis session on 1/12. He says that dialysis "wasn't too bad" but he still would not want to continue dialysis long term. He has a poor understanding of his prognosis and his goal is "to get healthy, no matter what." Discussed with patient that interventions being performed are for control of his symptoms and that his condition is not curable. He is still considering discharging home with hospice but has not made a firm decision. He remains full code. Advanced directives packet given " "to patient for review, encouraged him to at least consider completing MPOA and/or living will. Will follow up regarding completion of advanced directives.    1/9 Rounded on patient today, no family members at bedside. Patient is alert, oriented, and receptive to visit. He expresses appreciation for palliative care and  visits and says, "the more you all come, the better I feel." He has not made any firm decisions about plans after discharge but is leaning towards enrollment with [facility]. He likes the idea of having his symptoms managed at home so he does not have to keep coming back to the hospital. [facility] will accept the patient on home IV Dobutamine and/or Lasix if needed. He is full code and he is not ready to consider changing code status to DNR, although change in code status is not required for hospice enrollment. He has poor understanding and insight related to his clinical condition and prognosis; he continues to think that he can prove everyone wrong. He remains unsure about whether he wants to proceed with short term dialysis; he is clear that he would never want long term dialysis even if refusal would result in his death.    1/7 Rounded on patient today with BERTA Granado LCSW, no family members at bedside. Patient is alert, oriented, and cooperative with visit but irritable at times. Patient met with [facility] last week and they stopped by today to see him. He remains undecided about hospice but understands that they can help keep him out the hospital after discharge if that is what he wants. He expresses frustration with being in the hospital but also says that he would be willing to come back to the hospital if needed. He does not like the idea of going home on Dobutamine drip but now says that he would be willing as long as he does not have to roll an IV pole around. He says he will carry a "purse" if needed. Nephrology rounded on patient today and discussed possible " "need for dialysis. Patient is willing to consider trying dialysis for a "week or so" but is adamantly opposed to long-term dialysis. He is familiar with dialysis because his brother is a dialysis patient and says that he can't sit still for that long. He states that he would never want long-term dialysis even if he would die without it. At this time he remains full code.    1/6 Rounded on patient today, no family members at bedside. Patient is alert, oriented, and cooperative with visit. He expresses frustration with being in the hospital and has been intermittently refusing IV sticks, lab draws, and vital signs. Reminded patient that he has the option to enroll in hospice if he prefers to discontinue aggressive treatment and focus on comfort. Patient met with [facility] last week but has not yet made any decisions; he says that he is still thinking about it. Discussed the plan for patient to discharge on  drip but patient now says that he does not want to be on a drip when he gets discharged because he is not "toting no bag around." Patient has been told by primary team that he will likely need  drip for the rest of his life but he states that he is going to "prove them wrong." He says that he was told at age 42 that he would die without a defibrillator and he is still here so he has "proved them wrong" before. He also says that he would like to go to the rehab hospital down the street when he leaves here so he can get stronger. His goal remains to "get healthy" and he wishes to remain full code. He has poor understanding and insight related to his clinical condition and prognosis. Will involve palliative care  for assistance with emotional support and coping skills.    1/3 Conference conducted by this APRN with patient to discuss his current clinical status, goals of care, treatment options, code status, long term expected outcomes and prognostic awareness. After a discussion " "concerning his values and wishes, patient verbalized limited understanding and insight related to his clinical condition and prognostic awareness. His stated goal is to "get healthy." He has been told that he will need to be on  gtt for the rest of his life but still hopes that he can live a long time. He has never thought about his end of life preferences and whether he would prefer to die at home or at the hospital. Discussed home hospice as an option that would allow him to receive care in the home and provide management of symptoms. Patient is not ready to enroll in hospice at this time but is interested in meeting with a hospice rep to get more information on their services.    Patient has never completed a living will or MPOA. His preference for surrogate decision maker would be his brother Kris [place]. Explained to patient that palliative care can assist with completion of MPOA paperwork whenever he is ready. He is currently a full code and not ready to make any changes to his code status.        I will follow-up with patient. Please contact us if you have any additional questions.    Subjective:     Chief Complaint:   Chief Complaint   Patient presents with    Shortness of Breath     reports fluid retention        HPI:   Mr. Terrell is a 55 y/o male with PMHx of chronic combined systolic and diastolic heart failure (EF 25%), afib on Coumadin, CAD, CVA, and history of PE who [place] [facility] [place] for evaluation of shortness of breath and edema. He was just admitted to Hospital Medicine from 11/24-12/8 for CHF exacerbation. During that admission, he was evaluated by Cardiology who started him on Dobutamine and Diuril. He was resistant to HTS evaluation during that stay, which included recommendations for LVAD, transplant, and ICD. He was discharged home on Lasix 80mg PO BID. He endorsed compliance with his diuretic regimen, but despite this and following a low salt diet, he continued to have " dyspnea, orthopnea, edema and weight gain. He also complained of mostly dry cough with occasional production of blood tinged sputum.     CXR in the ED showed cardiomegaly and pulmonary edema, along with a masslike opacity. CT chest was ordered to evaluate the mass, but he was unable to lie flat to get it evaluated. He was given Lasix 80mg IV and was admitted to Hospital Medicine for further management. He was started on Lasix and  gtt.    Hospital Course:  No notes on file    Interval History: Patient has remained on  gtt at 2.5 mcg/kg since admission. He initially received several doses of IV Diuril but diuresis was limited. He responded well after Lasix gtt was increased to 30 mg/hr. Lasix gtt was stopped and patient was started on Lasix IVP on 1/3 but he began to worsen symptomatically. He received IV Diuril on 1/4 and was started back on Lasix gtt on 1/5. He has had recurrent epitaxis required 1 unit PRBC transfusion on 1/4 after Hgb dropped to 6.8. He again received Diuril on 1/6 and 1/7.    Patient agreed to temporary dialysis for volume removal and had a CVC placed on 1/11. He had his first HD session on 1/12. He was supposed to have HD again on 1/13 but line was clotted. TPA instilled in each port of HD catheter on the morning of 1/14 and HD completed that evening. Needs CVC replaced due to insufficient flow, initially refused replacement earlier today but has since agreed.    Past Medical History:   Diagnosis Date    Anticoagulant long-term use     Cardiomegaly     Chronic combined systolic and diastolic congestive heart failure     Coronary artery disease     Heart attack 2006    Hypertension     Hyperthyroidism, subclinical 1/2/2013    MI (myocardial infarction) 9/22/2013    MI in 2009      Paroxysmal atrial fibrillation     PE (pulmonary embolism) 1/1/2013    IN 2010     S/P ablation of atrial flutter 2008    Stroke 2009    no residual weaknesses       Past Surgical History:   Procedure  Laterality Date    COLONOSCOPY N/A 12/2/2019    Procedure: COLONOSCOPY;  Surgeon: Scott Rosario MD;  [facility]  Service: Endoscopy;  Laterality: N/A;    ESOPHAGOGASTRODUODENOSCOPY N/A 12/2/2019    Procedure: EGD (ESOPHAGOGASTRODUODENOSCOPY);  Surgeon: Scott Rosario MD;  [facility]  Service: Endoscopy;  Laterality: N/A;    RADIOFREQUENCY ABLATION  01/08/2008    for atrial flutter       Review of patient's allergies indicates:   Allergen Reactions    Acetaminophen      Itching    Oxycodone-acetaminophen      Other reaction(s): Itching    Ace inhibitors Other (See Comments)     cough       Medications:  Continuous Infusions:   DOBUTamine 2.5 mcg/kg/min (01/14/20 2221)     Scheduled Meds:   aspirin  81 mg Oral Daily    ferrous sulfate  325 mg Oral Daily    isosorbide-hydrALAZINE 20-37.5 mg  1 tablet Oral BID    warfarin  7 mg Oral Daily     PRN Meds:sodium chloride, acetaminophen, Dextrose 10% Bolus, Dextrose 10% Bolus, diphenhydrAMINE, glucagon (human recombinant), glucose, glucose, melatonin, methocarbamol, methyl salicylate-menthol 15-10%, morphine, ondansetron, oxyCODONE, promethazine (PHENERGAN) IVPB, senna-docusate 8.6-50 mg, sodium chloride    Family History     Problem Relation (Age of Onset)    Alcohol abuse Father    Hypertension Mother, Father, Sister, Brother    Stroke Mother        Tobacco Use    Smoking status: Never Smoker    Smokeless tobacco: Never Used   Substance and Sexual Activity    Alcohol use: No    Drug use: No    Sexual activity: Never       Review of Systems   Constitutional: Negative for appetite change and fatigue.   HENT: Negative for sore throat and trouble swallowing.    Respiratory: Positive for shortness of breath. Negative for cough and wheezing.    Cardiovascular: Positive for leg swelling. Negative for chest pain.   Gastrointestinal: Positive for constipation and nausea. Negative for diarrhea and vomiting.    Genitourinary: Negative for dysuria and hematuria.   Musculoskeletal: Positive for arthralgias (hips) and back pain. Negative for neck pain.   Skin: Positive for wound (RLE). Negative for rash.   Neurological: Negative for dizziness and light-headedness.   Psychiatric/Behavioral: Negative for confusion and dysphoric mood. The patient is not nervous/anxious.      Objective:     Vital Signs (Most Recent):  Temp: 97.8 °F (36.6 °C) (01/15/20 1609)  Pulse: 87 (01/15/20 1609)  Resp: 17 (01/15/20 1609)  BP: (!) 133/50 (01/15/20 1609)  SpO2: (!) 94 % (01/15/20 1609) Vital Signs (24h Range):  Temp:  [97.7 °F (36.5 °C)-98.2 °F (36.8 °C)] 97.8 °F (36.6 °C)  Pulse:  [] 87  Resp:  [16-20] 17  SpO2:  [90 %-95 %] 94 %  BP: (111-152)/(50-88) 133/50     Weight: 123.6 kg (272 lb 7.8 oz)  Body mass index is 40.24 kg/m².    Review of Symptoms  Symptom Assessment (ESAS 0-10 scale)   ESAS 0 1 2 3 4 5 6 7 8 9 10   Pain X             Dyspnea  X            Anxiety X             Nausea X             Depression  X             Anorexia X             Fatigue X             Insomnia X             Restlessness  X             Agitation X             CAM / Delirium _X_ --  ___+   Constipation     __ --  _X_+   Diarrhea           _X_ --  ___+  Bowel Management Plan (BMP): Yes    Comments: Senna-Docusate ordered BID PRN    Pain Assessment: denies pain at this time; reports intermittent shooting pain to bilateral hips, legs and back that worsens with movement or activity and improves with Oxycodone      OME in 24 hours: 30    Performance Status: 50    Physical Exam   Constitutional: He is oriented to person, place, and time. He appears well-developed and well-nourished.   On  gtt and Lasix gtt   HENT:   Head: Normocephalic and atraumatic.   Eyes: Conjunctivae and EOM are normal.   Neck: Normal range of motion. Neck supple.   Cardiovascular: Normal rate and regular rhythm.   Pulmonary/Chest: Effort normal. No respiratory distress.    Abdominal: Soft. There is no tenderness.   Musculoskeletal: Normal range of motion. He exhibits edema.   Neurological: He is alert and oriented to person, place, and time.   Skin: Skin is warm and dry.   Dressing to RLE ulceration   Psychiatric: His behavior is normal. Thought content normal. Cognition and memory are normal.   Vitals reviewed.      Significant Labs: All pertinent labs within the past 24 hours have been reviewed.  CBC:   Recent Labs   Lab 01/15/20  0328   WBC 9.76   HGB 6.5*   HCT 22.9*   MCV 88        BMP:  Recent Labs   Lab 01/15/20  0328   *      K 3.5   CL 95   CO2 31*   *   CREATININE 2.5*   CALCIUM 10.0   MG 2.4     LFT:  Lab Results   Component Value Date    AST 24 01/15/2020     (H) 2019    ALKPHOS 185 (H) 01/15/2020    BILITOT 1.8 (H) 01/15/2020     Albumin:   Albumin   Date Value Ref Range Status   01/15/2020 3.3 (L) 3.5 - 5.2 g/dL Final     Protein:   Total Protein   Date Value Ref Range Status   01/15/2020 8.6 (H) 6.0 - 8.4 g/dL Final     Lactic acid:   Lab Results   Component Value Date    LACTATE 1.0 2019    LACTATE 1.0 2019       Significant Imaging: I have reviewed all pertinent imaging results/findings within the past 24 hours.    Advance Care Planning   Advanced Directives::  Living Will: No  LaPOST: No  Do Not Resuscitate Status: No  Medical Power of : No. Patient identifies brother Kris [place] ([phone]) as his surrogate decision maker.    Decision-Making Capacity: Patient answered questions       Living Arrangements: Lives alone    Psychosocial/Cultural:  Patient is unmarried and has no children. His parents are . He has 5 siblings and a lot of cousins and friends. He lives alone in [place] but says his brother Kris lives right next to him. 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 He says that his goal is to get healthy. He worries about dying. He does not  like to talk about the future and prefers to take things day by day.    Spiritual:     F- Melina and Belief: does not identify with a particular Advent but prays to God    I - Importance: somewhat important  .  C - Community: does not belong to a [employer]    A - Address in Care:  visits      > 50% of 25 min visit spent in chart review, face to face discussion of goals of care, symptom assessment, coordination of care and emotional support.    Vira Davila NP  Palliative Medicine  [facility]

## 2020-01-16 NOTE — PROGRESS NOTES
Progress Note  Hospital Medicine    Provider team:    [facility] HOSP MED C  [facility] NEPHROLOGY CONSULT SERVICE  Admit Date: 12/24/2019  Encounter Date: 01/16/2020     SUBJECTIVE:     Follow-up Visit for: Acute on chronic combined systolic and diastolic congestive heart failure    HPI (See H&P for complete P,F,SHx):  54M with chronic combined systolic and diastolic heart failure (EF 25%), afib on coumadin, CAD, CVA, and history of PE who presents to the ED for evaluation of shortness of breath and edema.  He was just admitted to Hospital Medicine from 11/24-12/8 for a CHF exacerbation.  During that admission, he was evaluated by Cardiology who started him on Dobutamine and Diuril.  He was resistant to HTS evaluation during that stay, which included recommendations for LVAD, transplant, and ICD.  He was discharged home on Lasix 80mg PO BID.  He endorses compliance with his diuretic regimen, but despite this and following a low salt diet, he has continued to gain weight, have lower extremity and abdominal swelling, as well as shortness of breath.  He is so short of breath that he has to sleep in a chair, and has been unable to sleep because of his breathing.  He denies any chest pain.  He does have a cough that is mostly dry, but occasionally productive of blood tinged sputum.  He was discharged with a weight of 259lbs, and claims that is around his dry weight.  He is currently 286lbs.       Upon arrival to the ED, vitals were /83, HR 88, RR 22.  Labs showed Hemoglobin 7.3, INR 1.5, Creatinine 1.8, Alk Phos 278,  with Trop 0.128. CXR showed cardiomegaly and pulmonary edema, along with a masslike opacity. CT chest was attempted to be ordered to evaluate the mass, but he was unable to lie flat to get it evaluated.  He was given Lasix 80mg IV and was admitted to Hospital Medicine for further management.    TTE 11/27/2019  · Severely decreased LVSF.  · The estimated ejection fraction is 25%  · Concentric left  ventricular hypertrophy.  · Global hypokinetic wall motion.  · Moderate right ventricular enlargement.  · Moderately reduced RVSF.  · Severe biatrial enlargement.  · Moderate mitral regurgitation.  · Moderate tricuspid regurgitation.  · The estimated PA systolic pressure is 40 mm Hg  · Elevated central venous pressure (15 mm Hg).  · Atrial fibrillation observed.    Hospital course:  Patient started on IV diuretics and  infusion for diagnosis of acute on chronic combined heart failure. His weight was stagnant and UOP tapered, so he was transitioned to IVP lasix for IV lasix gtt as this was actually effective during prior admission under guidance from co-management cardiology. However, the patient actually worsened symptomatically and TBili increased along with Cr and weight increase. The patient is now started back on lasix infusion with diuril augmentation. The patient's course was c/b epistaxis, possibly related to his decompensated picture. He required pRBC transfusion. Nephrology has initiated temporary dialysis for the patient.    Interval history:  I had long discussions with the patient over >1 hour given the lack of cohesion in plan of care. The patient is accepting of going home with hospice at this point. I advised we can stop HD as the features would only be temporizing as in my discussions with palliative care it would not be feasible to continue HD as outpatient as a unit would not accept with  on board. Patient wishes to go home with  and does not want to continue dialysis as outpatient. In order to maximize patient's time at home, plan to work towards home hospice discharge. Patient agreeable to removal of line. RN present as witness to discussions.  Patient without new complaints today. No further bleeding. Has edema and diffuse body aches.    Wt Readings from Last 30 Encounters:   01/16/20 124.2 kg (273 lb 13 oz)   12/07/19 117.6 kg (259 lb 4.2 oz)   10/23/19 108.9 kg (240 lb)   09/09/19  108.9 kg (240 lb)   08/04/19 105.2 kg (232 lb)   07/18/19 108.2 kg (238 lb 8.6 oz)   06/20/19 118.9 kg (262 lb 2 oz)   05/21/19 121.1 kg (267 lb)   05/16/19 116.3 kg (256 lb 6.3 oz)   04/20/19 127.5 kg (281 lb 1.4 oz)   01/31/19 113.4 kg (250 lb)   11/04/18 123.1 kg (271 lb 6.2 oz)   08/31/18 119.6 kg (263 lb 10.7 oz)   07/28/18 113.4 kg (250 lb)   07/10/18 120.7 kg (266 lb)   05/03/18 117.9 kg (259 lb 14.8 oz)   03/30/18 117.9 kg (260 lb)   02/21/18 117.9 kg (260 lb)   01/12/18 113.4 kg (250 lb)   12/10/17 117.9 kg (260 lb)   11/11/17 113.9 kg (251 lb)   11/01/17 111.8 kg (246 lb 7.6 oz)   10/26/17 118.5 kg (261 lb 3.2 oz)   08/13/17 117.9 kg (260 lb)   08/12/17 117.9 kg (260 lb)   05/10/17 117.9 kg (260 lb)   04/05/17 108.4 kg (239 lb 1.4 oz)   02/01/17 108.9 kg (240 lb)   12/15/16 119.4 kg (263 lb 3.7 oz)   08/26/16 112.5 kg (248 lb)     Review of Systems:  Review of Systems   Constitutional: Negative for chills and fever.   HENT: Negative for congestion and sore throat.    Eyes: Negative for photophobia, pain and discharge.   Respiratory: Positive for shortness of breath. Negative for cough, hemoptysis and sputum production.    Cardiovascular: Positive for orthopnea, leg swelling and PND. Negative for chest pain and palpitations.   Gastrointestinal: Negative for abdominal pain, diarrhea, nausea and vomiting.   Genitourinary: Negative for dysuria and urgency.   Musculoskeletal: Positive for neck pain. Negative for myalgias.   Skin: Negative for itching and rash.   Neurological: Negative for sensory change, focal weakness and headaches.   Endo/Heme/Allergies: Negative for polydipsia. Does not bruise/bleed easily.   Psychiatric/Behavioral: Negative for depression and suicidal ideas.     Past Medical History:   Diagnosis Date    Anticoagulant long-term use     Cardiomegaly     Chronic combined systolic and diastolic congestive heart failure     Coronary artery disease     Heart attack 2006    Hypertension      Hyperthyroidism, subclinical 1/2/2013    MI (myocardial infarction) 9/22/2013    MI in 2009      Paroxysmal atrial fibrillation     PE (pulmonary embolism) 1/1/2013    IN 2010     S/P ablation of atrial flutter 2008    Stroke 2009    no residual weaknesses     Social History     Socioeconomic History    Marital status: Single     Spouse name: Not on file    Number of children: Not on file    Years of education: Not on file    Highest education level: Not on file   Occupational History    Not on file   Social Needs    Financial resource strain: Not on file    Food insecurity:     Worry: Not on file     Inability: Not on file    Transportation needs:     Medical: Not on file     Non-medical: Not on file   Tobacco Use    Smoking status: Never Smoker    Smokeless tobacco: Never Used   Substance and Sexual Activity    Alcohol use: No    Drug use: No    Sexual activity: Never   Lifestyle    Physical activity:     Days per week: Not on file     Minutes per session: Not on file    Stress: Not on file   Relationships    Social connections:     Talks on phone: Not on file     Gets together: Not on file     Attends Shinto service: Not on file     Active member of club or organization: Not on file     Attends meetings of clubs or organizations: Not on file     Relationship status: Not on file   Other Topics Concern    Not on file   Social History Narrative    Not on file       OBJECTIVE:       Intake/Output Summary (Last 24 hours) at 1/16/2020 0923  Last data filed at 1/16/2020 0500  Gross per 24 hour   Intake 1507.6 ml   Output 2350 ml   Net -842.4 ml     Vital Signs Range (Last 24H):  Temp:  [97.8 °F (36.6 °C)-98.7 °F (37.1 °C)]   Pulse:  []   Resp:  [17-20]   BP: (120-140)/(50-82)   SpO2:  [93 %-97 %]   Body mass index is 40.43 kg/m².    Objective:  General Appearance:  Comfortable, well-appearing, in no acute distress and not in pain.    Vital signs: (most recent): Blood pressure (!) 140/72,  "pulse 102, temperature 98.7 °F (37.1 °C), temperature source Oral, resp. rate 20, height 5' 9" (1.753 m), weight 124.2 kg (273 lb 13 oz), SpO2 95 %.  No fever.    Output: Producing urine and producing stool.    HEENT: Normal HEENT exam.  (+JVD)    Lungs:  Normal effort.  Breath sounds clear to auscultation.  He is not in respiratory distress.  There are rales.  No wheezes.    Heart: Normal rate.  Regular rhythm.  S1 normal and S2 normal.  Positive for murmur.    Chest: Symmetric chest wall expansion.   Abdomen: Abdomen is soft.  Bowel sounds are normal.   There is no abdominal tenderness.     Extremities: Normal range of motion.  There is venous stasis and dependent edema.  There is no deformity, effusion or local swelling.    Pulses: Distal pulses are intact.    Neurological: Patient is alert and oriented to person, place and time.  Normal strength.    Pupils:  Pupils are equal, round, and reactive to light.    Skin:  Warm and dry.      Medications:  Medication list was reviewed in EPIC and changes noted under Assessment/Plan and MAR.    Laboratory:  Recent Labs     01/16/20  0304   WBC 14.94*   RBC 2.65*   HGB 6.8*   HCT 23.4*      MCV 88   MCH 25.7*   MCHC 29.1*   GRAN 79.6*  11.9*   LYMPH 4.0*  0.6*   MONO 13.7  2.0*   EOS 0.3      Recent Labs     01/16/20  0304   *      K 3.3*   CL 94*   CO2 32*   *   CREATININE 2.7*   CALCIUM 9.9   ANIONGAP 15   MG 2.3   PHOS 4.1     Prior records reviewed.    Imaging reviewed  Imaging Results          CT Chest Without Contrast (No Result on File)                X-Ray Chest AP Portable (Final result)  Result time 12/24/19 19:09:07    Final result by Zeb Hirsch MD (12/24/19 19:09:07)                 Impression:      Masslike opacity overlying the right lower lung zone, similar to prior exam.  Right pleural fluid not excluded.  Chest CT recommended for further evaluation of right lung masslike opacity.    Patchy bilateral pulmonary opacities, " similar to prior exam.    Cardiomegaly.    Electronically signed by resident: Cristofer Reddy  Date:    12/24/2019  Time:    18:48    Electronically signed by: Zeb Hirsch MD  Date:    12/24/2019  Time:    19:09             Narrative:    EXAMINATION:  XR CHEST AP PORTABLE    CLINICAL HISTORY:  CHF;    TECHNIQUE:  Single frontal view of the chest was performed.    COMPARISON:  Chest radiograph 12/03/2019    FINDINGS:  Cardiac leads overlie the chest wall.    Redemonstration of extensive bilateral patchy opacities, more prominent on the right with a focal area of masslike opacity in the right lower lung zone.  No significant change from prior exam.  Left costophrenic angle is visible and right sided pleural fluid not excluded noting presence of opacification.  No pneumothorax.    The cardiac silhouette is enlarged.  Hilar and mediastinal contours are unchanged.    Bones are intact.                                  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 CKD [I13.0]  Yes    Elevated alkaline phosphatase level [R74.8]  Yes    Coronary artery disease [I25.10]  Yes     Chronic    Stage 3 chronic kidney disease [N18.3]  Yes    Subtherapeutic international normalized ratio (INR) [R79.1]  Yes    Personal history of cerebral embolism [Z86.79]  Not Applicable    Personal history of venous thrombosis and embolism [Z86.718]  Not Applicable    Personal history of MI (myocardial infarction) [I25.2]  Not Applicable    Essential hypertension [I10]  Yes     Chronic    Atrial fibrillation, chronic [I48.20]  Yes     Chronic     Chronic anticoagulation [Z79.01]  Not Applicable     Chronic    Cardiomyopathy [I42.9]  Yes     Chronic      Resolved Hospital Problems   No resolved problems to display.      Acute on Chronic Combined Systolic and Diastolic Heart Failure  Cardiomyopathy  · CHF Pathway initiated  · Started on Lasix gtt, increased to 40 mg/hr.  ·  Failed transition to high dose IV Lasix pushes.   · Subsequently started back on Lasix gtt  · Changed Diuril to BID Metolazone 10 mg per Nephro recs  · Cont  gtt 2.5, which will be continued at home.   · Will need long-term IV placement when he nears discharge.   · PICC placement may be an issue given his renal failure   · Hold diuretics for today per nephrology   · BID BMP (with morning labs and at 4pm)  · Strict I&Os with daily weights.  · Hold BB, okay to c/w BiDil  · Will d/w nephrology appropriate diuretic regimen     JEAN-PIERRE on CKD3  · Baseline 1.8  · Cardiorenal   · BUN has been rising  · S/p HD session on 1/12  · Patient now accepting of home with hospice plan  · No indication at present time for further HD as treatments would be only temporizing and goal is to maximize patient's time at home/quality of life    Chronic AFib  Long Term Use of Anticoagulants  Supratherapeutic INR  · Chronic issue  · HR in 100-120's  · INR initially subtherapeutic. Dose increased to 7.5mg. Then supratherapeutic so held coumadin. INR now therapeutic, continue 6 mg daily. Pharmacy consulted for dosing management     Essential HTN  · Chronic and stable  · Continue Bidil BID     CAD  History of MI  · Chronic and stable  · Continue Aspirin 81mg PO daily  · Continue Bidil BID     Anemia  · Received 5 units PRBCs on prior hospital admission  · Continue PO iron  · Type and screen obtained  · Monitor for bleeding/need for transfusion  · Transfused 1U pRBC after episode of epistaxis noted. Refusing further transfusions  · Suspect dilutional at this time  · Patient does agree for 1U pRBC in anticipation of  procedure 1/16  · May need additional pRBC, hold for today     History of VTE  History of Stroke  · Chronic and stable  · Continue Aspirin 81mg PO daily  · Continue Warfarin when INR appropriate      Elevated Alk Phos  · Chronic and stable  · Monitor     Benign Hypertensive Heart and Kidney Disease with HF and CKD  · As above     Abnormal CT  · Needs CT chest when able to lie flat or outpt to evaluate masslike opacity seen on CXR     Diet:  Low Sodium, 1 L fluid restriction  VTE PPx:  Warfarin  Goals of Care:  Full     High Risk Conditions:   Patient is currently on drug therapy requiring intensive monitoring for toxicity: Lasix gtt    Anticipated discharge date and disposition:   Home hospice on discharge. Planning for possible discharge within 24-48 hours.    Markus Howard MD  [facility] Medicine

## 2020-01-16 NOTE — ASSESSMENT & PLAN NOTE
Palliative care consulted for goals of care discussion/advanced care planning for this 53 y/o male being followed by hospital medicine for Acute on Chronic Combined Systolic and Diastolic Heart Failure, Cardiomyopathy, JEAN-PIERRE on CKD3, Chronic AFib, Essential HTN, CAD, Anemia, Elevated Alk Phos, Abnormal CT, History of MI, History of VTE and History of Stroke. Rounded on patient today, no family members at bedside. Patient is alert and oriented but very quiet. He has completed two dialysis treatments so far. Needs CVC replaced due to insufficient flow, initially refused replacement earlier today but has since agreed.    Plan/Recommendations:  1. See goals of care discussion below.  2. Palliative care will follow up with patient.    Goals of Care/Advance Care Plannin/15 Rounded on patient today, no family members at bedside. Patient is alert and oriented but very quiet. He has completed two dialysis treatments so far. Needs CVC replaced due to insufficient flow, initially refused replacement earlier today but has since agreed. Patient is fine with temporary dialysis but still says that he would not want dialysis long term. Asked patient to consider how long he wants to continue with dialysis but he just sat quietly with his eyes closed and did not respond. Attempted to talk about patient's understanding of his prognosis and whether he would prefer to spend his time at home or in the hospital given that his condition is not curable. Patient continued to sit quietly with eyes closed, not talking. Asked patient whether he was awake and understood what NP was saying and he said yes. Asked for his thoughts and he did not respond. Encouraged patient to take some time to think, will follow up.     Rounded on patient today, no family members at bedside. Patient is alert and oriented but not very talkative and slow to respond to questions. He is agreeable to another dialysis session this afternoon but continues to say  "that he would not want HD long term. Patient's goal is still to "get healthy," again explained that his condition is not curable but symptoms can be managed with medications. He continues to have poor insight into his condition and is not receptive to discussions regarding prognosis. Tried to discuss what the future may look like but patient says he is not thinking about the future and wants to take things day by day. He is aware that home hospice is available to him and would allow him to stay out of the hospital after discharge. Patient has advanced directives packet at bedside but has not yet looked at it, encouraged him to read through it tonight. Will continue to follow up with patient.    1/13 Rounded on patient today, no family members at bedside. Patient is alert, oriented, and cooperative with visit. He has been started on temporary dialysis for volume removal with first dialysis session on 1/12. He says that dialysis "wasn't too bad" but he still would not want to continue dialysis long term. He has a poor understanding of his prognosis and his goal is "to get healthy, no matter what." Discussed with patient that interventions being performed are for control of his symptoms and that his condition is not curable. He is still considering discharging home with hospice but has not made a firm decision. He remains full code. Advanced directives packet given to patient for review, encouraged him to at least consider completing MPOA and/or living will. Will follow up regarding completion of advanced directives.    1/9 Rounded on patient today, no family members at bedside. Patient is alert, oriented, and receptive to visit. He expresses appreciation for palliative care and  visits and says, "the more you all come, the better I feel." He has not made any firm decisions about plans after discharge but is leaning towards enrollment with [facility]. He likes the idea of having his symptoms managed at " "home so he does not have to keep coming back to the hospital. [facility] will accept the patient on home IV Dobutamine and/or Lasix if needed. He is full code and he is not ready to consider changing code status to DNR, although change in code status is not required for hospice enrollment. He has poor understanding and insight related to his clinical condition and prognosis; he continues to think that he can prove everyone wrong. He remains unsure about whether he wants to proceed with short term dialysis; he is clear that he would never want long term dialysis even if refusal would result in his death.    1/7 Rounded on patient today with BERTA Granado LCSW, no family members at bedside. Patient is alert, oriented, and cooperative with visit but irritable at times. Patient met with [facility] last week and they stopped by today to see him. He remains undecided about hospice but understands that they can help keep him out the hospital after discharge if that is what he wants. He expresses frustration with being in the hospital but also says that he would be willing to come back to the hospital if needed. He does not like the idea of going home on Dobutamine drip but now says that he would be willing as long as he does not have to roll an IV pole around. He says he will carry a "purse" if needed. Nephrology rounded on patient today and discussed possible need for dialysis. Patient is willing to consider trying dialysis for a "week or so" but is adamantly opposed to long-term dialysis. He is familiar with dialysis because his brother is a dialysis patient and says that he can't sit still for that long. He states that he would never want long-term dialysis even if he would die without it. At this time he remains full code.    1/6 Rounded on patient today, no family members at bedside. Patient is alert, oriented, and cooperative with visit. He expresses frustration with being in the hospital and has been " "intermittently refusing IV sticks, lab draws, and vital signs. Reminded patient that he has the option to enroll in hospice if he prefers to discontinue aggressive treatment and focus on comfort. Patient met with [facility] last week but has not yet made any decisions; he says that he is still thinking about it. Discussed the plan for patient to discharge on  drip but patient now says that he does not want to be on a drip when he gets discharged because he is not "toting no bag around." Patient has been told by primary team that he will likely need  drip for the rest of his life but he states that he is going to "prove them wrong." He says that he was told at age 42 that he would die without a defibrillator and he is still here so he has "proved them wrong" before. He also says that he would like to go to the rehab hospital down the street when he leaves here so he can get stronger. His goal remains to "get healthy" and he wishes to remain full code. He has poor understanding and insight related to his clinical condition and prognosis. Will involve palliative care  for assistance with emotional support and coping skills.    1/3 Conference conducted by this APRN with patient to discuss his current clinical status, goals of care, treatment options, code status, long term expected outcomes and prognostic awareness. After a discussion concerning his values and wishes, patient verbalized limited understanding and insight related to his clinical condition and prognostic awareness. His stated goal is to "get healthy." He has been told that he will need to be on  gtt for the rest of his life but still hopes that he can live a long time. He has never thought about his end of life preferences and whether he would prefer to die at home or at the hospital. Discussed home hospice as an option that would allow him to receive care in the home and provide management of symptoms. Patient is not ready " to enroll in hospice at this time but is interested in meeting with a hospice rep to get more information on their services.    Patient has never completed a living will or MPOA. His preference for surrogate decision maker would be his brother Kris [place]. Explained to patient that palliative care can assist with completion of MPOA paperwork whenever he is ready. He is currently a full code and not ready to make any changes to his code status.

## 2020-01-16 NOTE — CHAPLAIN
"Met w/pt for 20 min. Long pauses between comments, eyes closed; tired and/or in thought. Told me prognosis doc told him and was a little angry saying, "how does HE know how long I'll live, only HE (pointing up) knows." I agreed. Also stated that docs only going by his experience and medical knowledge and wants to be open and honest with you, but God's will does prevail. He said if God didn't want him to live, he'd not be in the hospital. I shared that some times people believe that God wants you to move closer to him in times like this. Who knows? Pt said he'll take it "one day at a time". I asked what he wants?  He said to go home.I reminded him that he'll have to do his best to cooperate with the clinical team and answer questions so they can offer him the best options. The NP and SW came in at that time and we did the hand off. I asked if he wanted me to pray with him before I leave and he said yes and held out his hand. I de-briefed with SW and NP afterwards.  "

## 2020-01-16 NOTE — PLAN OF CARE
[facility]  Palliative Care   Follow Up Note:    Patient Name: Hamlet Terrell  MRN: [id number]  Admission Date: 12/24/2019  Hospital Length of Stay: 23 days  Code Status: Full Code   Attending Provider: Markus Howard MD  Palliative Care Provider: Vira Davila NP  Primary Care Physician: Carlin Swift MD  Principal Problem:Acute on chronic combined systolic and diastolic congestive heart failure       Visit to bedside with Pal Care NP.  Haley Guardado present when arrived. Present during prayer with  and pt.    Spoke with pt about options for discharge as he had been speaking with the  about wanting to go home.     Optons:    1) Home with Hospice- with   2) Trying one more dialysis treatment then home with hospice to see if can make him feel better longer. (Dialysis may not end up being option due to continuous clotting)  3) Dialysis in hospital.    Explained that pt would not be able to go home with dialysis and  as dialysis units do not accept IV  usually. Also explained that as pt remains in hospital he will continue to get weaker and home will not be an option. Explained that pt has a small window to get home with hospice and be able to care for self. Reiterated that he will continue to get weaker and decline off dialysis, especially if pt stops  and he will need care at home from family.    Reiterated pt's options several time. Pt asked for SW to return tomorrow with his answer.Emotional Support provided to pt.      Spoke with  about pt's options: 1) Hospice soon with  or stay in hospital with dialysis and pt not be able to get home as he gets weaker.  to continue to follow.    MODESTA spoke with DON Martinez and Dr. Howard. Will follow up.    Brooke Apple, THOMAS, ACHDENISE-MODESTA

## 2020-01-16 NOTE — PLAN OF CARE
Pt remained free of injuries, falls, and trauma. VS stable. No complaints of pain or sob. Pt received 1 unit of PRBCs. Pt tolerated well. Pt on  gtt per MAR orders. Pt tolerating well. Lasix gtt D/Giovanny during day shift. Pt diuresing well. I/Os being monitored. Plan of care reviewed with pt. All questions and concerns addressed. Will continue to monitor.

## 2020-01-17 LAB
ALBUMIN SERPL BCP-MCNC: 3.2 G/DL (ref 3.5–5.2)
ALP SERPL-CCNC: 202 U/L (ref 55–135)
ALT SERPL W/O P-5'-P-CCNC: 17 U/L (ref 10–44)
ANION GAP SERPL CALC-SCNC: 15 MMOL/L (ref 8–16)
AST SERPL-CCNC: 28 U/L (ref 10–40)
BASOPHILS # BLD AUTO: 0.03 K/UL (ref 0–0.2)
BASOPHILS NFR BLD: 0.3 % (ref 0–1.9)
BILIRUB SERPL-MCNC: 2 MG/DL (ref 0.1–1)
BLD PROD TYP BPU: NORMAL
BLOOD UNIT EXPIRATION DATE: NORMAL
BLOOD UNIT TYPE CODE: 8400
BLOOD UNIT TYPE: NORMAL
BUN SERPL-MCNC: 114 MG/DL (ref 6–20)
CALCIUM SERPL-MCNC: 9.9 MG/DL (ref 8.7–10.5)
CHLORIDE SERPL-SCNC: 94 MMOL/L (ref 95–110)
CO2 SERPL-SCNC: 31 MMOL/L (ref 23–29)
CODING SYSTEM: NORMAL
CREAT SERPL-MCNC: 2.6 MG/DL (ref 0.5–1.4)
DIFFERENTIAL METHOD: ABNORMAL
DISPENSE STATUS: NORMAL
EOSINOPHIL # BLD AUTO: 0.5 K/UL (ref 0–0.5)
EOSINOPHIL NFR BLD: 4.5 % (ref 0–8)
ERYTHROCYTE [DISTWIDTH] IN BLOOD BY AUTOMATED COUNT: 21.3 % (ref 11.5–14.5)
EST. GFR  (AFRICAN AMERICAN): 31.2 ML/MIN/1.73 M^2
EST. GFR  (NON AFRICAN AMERICAN): 26.9 ML/MIN/1.73 M^2
GLUCOSE SERPL-MCNC: 152 MG/DL (ref 70–110)
HCT VFR BLD AUTO: 22.6 % (ref 40–54)
HGB BLD-MCNC: 6.5 G/DL (ref 14–18)
IMM GRANULOCYTES # BLD AUTO: 0.18 K/UL (ref 0–0.04)
IMM GRANULOCYTES NFR BLD AUTO: 1.7 % (ref 0–0.5)
INR PPP: 1.8 (ref 0.8–1.2)
LYMPHOCYTES # BLD AUTO: 0.6 K/UL (ref 1–4.8)
LYMPHOCYTES NFR BLD: 5.7 % (ref 18–48)
MAGNESIUM SERPL-MCNC: 2.3 MG/DL (ref 1.6–2.6)
MCH RBC QN AUTO: 25.5 PG (ref 27–31)
MCHC RBC AUTO-ENTMCNC: 28.8 G/DL (ref 32–36)
MCV RBC AUTO: 89 FL (ref 82–98)
MONOCYTES # BLD AUTO: 2 K/UL (ref 0.3–1)
MONOCYTES NFR BLD: 19.6 % (ref 4–15)
NEUTROPHILS # BLD AUTO: 7.1 K/UL (ref 1.8–7.7)
NEUTROPHILS NFR BLD: 68.2 % (ref 38–73)
NRBC BLD-RTO: 1 /100 WBC
PHOSPHATE SERPL-MCNC: 3.4 MG/DL (ref 2.7–4.5)
PLATELET # BLD AUTO: 251 K/UL (ref 150–350)
PMV BLD AUTO: 10.6 FL (ref 9.2–12.9)
POTASSIUM SERPL-SCNC: 3.5 MMOL/L (ref 3.5–5.1)
PROT SERPL-MCNC: 8.3 G/DL (ref 6–8.4)
PROTHROMBIN TIME: 17 SEC (ref 9–12.5)
RBC # BLD AUTO: 2.55 M/UL (ref 4.6–6.2)
SODIUM SERPL-SCNC: 140 MMOL/L (ref 136–145)
TRANS ERYTHROCYTES VOL PATIENT: NORMAL ML
WBC # BLD AUTO: 10.38 K/UL (ref 3.9–12.7)

## 2020-01-17 PROCEDURE — A4216 STERILE WATER/SALINE, 10 ML: HCPCS | Performed by: HOSPITALIST

## 2020-01-17 PROCEDURE — 36415 COLL VENOUS BLD VENIPUNCTURE: CPT

## 2020-01-17 PROCEDURE — 36573 INSJ PICC RS&I 5 YR+: CPT

## 2020-01-17 PROCEDURE — 27201040 HC RC 50 FILTER

## 2020-01-17 PROCEDURE — 97803 MED NUTRITION INDIV SUBSEQ: CPT

## 2020-01-17 PROCEDURE — 20600001 HC STEP DOWN PRIVATE ROOM

## 2020-01-17 PROCEDURE — 25000003 PHARM REV CODE 250: Performed by: HOSPITALIST

## 2020-01-17 PROCEDURE — 99233 SBSQ HOSP IP/OBS HIGH 50: CPT | Mod: ,,, | Performed by: HOSPITALIST

## 2020-01-17 PROCEDURE — 85025 COMPLETE CBC W/AUTO DIFF WBC: CPT

## 2020-01-17 PROCEDURE — C1751 CATH, INF, PER/CENT/MIDLINE: HCPCS

## 2020-01-17 PROCEDURE — 83735 ASSAY OF MAGNESIUM: CPT

## 2020-01-17 PROCEDURE — 76937 US GUIDE VASCULAR ACCESS: CPT

## 2020-01-17 PROCEDURE — 99233 PR SUBSEQUENT HOSPITAL CARE,LEVL III: ICD-10-PCS | Mod: ,,, | Performed by: HOSPITALIST

## 2020-01-17 PROCEDURE — P9021 RED BLOOD CELLS UNIT: HCPCS

## 2020-01-17 PROCEDURE — 85610 PROTHROMBIN TIME: CPT

## 2020-01-17 PROCEDURE — 80053 COMPREHEN METABOLIC PANEL: CPT

## 2020-01-17 PROCEDURE — 63600175 PHARM REV CODE 636 W HCPCS: Performed by: HOSPITALIST

## 2020-01-17 PROCEDURE — 84100 ASSAY OF PHOSPHORUS: CPT

## 2020-01-17 RX ORDER — DOBUTAMINE HYDROCHLORIDE 400 MG/100ML
2.5 INJECTION, SOLUTION INTRAVENOUS CONTINUOUS
Qty: 250 ML | Status: ON HOLD
Start: 2020-01-17 | End: 2020-03-09 | Stop reason: HOSPADM

## 2020-01-17 RX ORDER — METHOCARBAMOL 500 MG/1
500 TABLET, FILM COATED ORAL 3 TIMES DAILY PRN
Start: 2020-01-17 | End: 2020-01-27

## 2020-01-17 RX ORDER — ONDANSETRON 4 MG/1
4 TABLET, FILM COATED ORAL EVERY 8 HOURS PRN
Start: 2020-01-17

## 2020-01-17 RX ORDER — FUROSEMIDE 80 MG/1
80 TABLET ORAL 2 TIMES DAILY
Status: DISCONTINUED | OUTPATIENT
Start: 2020-01-17 | End: 2020-01-18 | Stop reason: HOSPADM

## 2020-01-17 RX ORDER — SODIUM CHLORIDE 0.9 % (FLUSH) 0.9 %
10 SYRINGE (ML) INJECTION
Status: DISCONTINUED | OUTPATIENT
Start: 2020-01-17 | End: 2020-01-18 | Stop reason: HOSPADM

## 2020-01-17 RX ORDER — POTASSIUM CHLORIDE 20 MEQ/15ML
40 SOLUTION ORAL ONCE
Status: COMPLETED | OUTPATIENT
Start: 2020-01-17 | End: 2020-01-17

## 2020-01-17 RX ORDER — OXYCODONE HYDROCHLORIDE 15 MG/1
15 TABLET ORAL EVERY 4 HOURS PRN
Refills: 0
Start: 2020-01-17

## 2020-01-17 RX ORDER — SODIUM CHLORIDE 0.9 % (FLUSH) 0.9 %
10 SYRINGE (ML) INJECTION EVERY 6 HOURS
Status: DISCONTINUED | OUTPATIENT
Start: 2020-01-17 | End: 2020-01-18 | Stop reason: HOSPADM

## 2020-01-17 RX ORDER — HYDROCODONE BITARTRATE AND ACETAMINOPHEN 500; 5 MG/1; MG/1
TABLET ORAL
Status: DISCONTINUED | OUTPATIENT
Start: 2020-01-17 | End: 2020-01-18 | Stop reason: HOSPADM

## 2020-01-17 RX ORDER — AMOXICILLIN 250 MG
1 CAPSULE ORAL 2 TIMES DAILY PRN
Start: 2020-01-17

## 2020-01-17 RX ADMIN — MENTHOL, METHYL SALICYLATE: 10; 15 CREAM TOPICAL at 10:01

## 2020-01-17 RX ADMIN — FUROSEMIDE 80 MG: 80 TABLET ORAL at 02:01

## 2020-01-17 RX ADMIN — POTASSIUM CHLORIDE 40 MEQ: 20 SOLUTION ORAL at 02:01

## 2020-01-17 RX ADMIN — FUROSEMIDE 80 MG: 80 TABLET ORAL at 07:01

## 2020-01-17 RX ADMIN — OXYCODONE HYDROCHLORIDE 15 MG: 10 TABLET ORAL at 03:01

## 2020-01-17 RX ADMIN — HYDRALAZINE HYDROCHLORIDE AND ISOSORBIDE DINITRATE 1 TABLET: 37.5; 2 TABLET, FILM COATED ORAL at 09:01

## 2020-01-17 RX ADMIN — MENTHOL, METHYL SALICYLATE: 10; 15 CREAM TOPICAL at 04:01

## 2020-01-17 RX ADMIN — FERROUS SULFATE TAB EC 325 MG (65 MG FE EQUIVALENT) 325 MG: 325 (65 FE) TABLET DELAYED RESPONSE at 09:01

## 2020-01-17 RX ADMIN — Medication 10 ML: at 05:01

## 2020-01-17 RX ADMIN — ASPIRIN 81 MG: 81 TABLET, COATED ORAL at 09:01

## 2020-01-17 RX ADMIN — WARFARIN SODIUM 7 MG: 5 TABLET ORAL at 05:01

## 2020-01-17 RX ADMIN — OXYCODONE HYDROCHLORIDE 15 MG: 10 TABLET ORAL at 10:01

## 2020-01-17 RX ADMIN — DOBUTAMINE IN DEXTROSE 2.5 MCG/KG/MIN: 200 INJECTION, SOLUTION INTRAVENOUS at 04:01

## 2020-01-17 NOTE — PROGRESS NOTES
[facility]  Nephrology  Progress Note    Patient Name: Hamlet Terrell  MRN: [id number]  Admission Date: 12/24/2019  Hospital Length of Stay: 23 days  Attending Provider: Markus Howard MD   Primary Care Physician: Carlin Swift MD  Principal Problem:Acute on chronic combined systolic and diastolic congestive heart failure    Subjective:     HPI: The pt is a 53 yo M with chronic combined HF (EF 25% on 11/27), atrial fibrillation on chronic AC (coumadin), CAD, CVA, and history of PE that presented to the ED with SOB & edema. Nephrology was consulted for assistance with management of cardiorenal syndrome in the setting of JEAN-PIERRE on CKD III now resistive to diuretic management. Baseline Cr appears to be 1.6-1.9. Cr elevated to 3.1 at time of consult. Of note, he was recently admitted to Hospital Medicine from 11/24-12/8 for a CHF exacerbation.  During that admission, he was evaluated by Cardiology who started him on Dobutamine and Diuril.  He was DC'ed home on Lasix 80 mg po BID.  He reports that he was compliant with this regimen, but despite this, he had continued to gain weight, have lower extremity and abdominal swelling, as well as shortness of breath. He was discharged with a weight of 259lbs, and claims that is around his dry weight. He is currently 283 lbs.       Per Dr. Goins:   Patient started on IV diuretics and  infusion for diagnosis of acute on chronic combined heart failure. His weight was stagnant and UOP tapered, so he was transitioned to IVP lasix for IV lasix gtt as this was actually effective during prior admission under guidance from co-management cardiology. However, the patient actually worsened symptomatically and TBili increased along with Cr and weight increase. The patient is now started back on lasix infusion with diuril augmentation. The patient's course is also c/b epistaxis, possibly related to his decompensated picture. He has required pRBC transfusion. He refuses to  apply pressure to the bridge of his nose as he says he cannot breathe through his mouth.  The patient's course is also c/b fever, likely due to pRBC transfusion.     Cardiology consulted for diuretic resistance. Despite two-week hospital stay thus far, he is only down 3 lbs. Nephrology consulted for acute renal failure and consideration of temporary HD for volume removal. Pt continues to exhibit poor insight into his disease. His wishes (to get healthy, ride his bike, walk to the store, return to the hospital if needed) is not on par with hospice. He reported similar feelings to Palliative Care yesterday. Discussed with Palliative Care provider. He is however now amenable for home dobutamine. PICC placement will likely be an issue given his current renal failure. Hb 6.9 secondary to renal failure; pt would like to hold off on transfusion today.    Interval History: Patient seen and examined at bedside, refused line placement after talking with primary regarding long term prognosis. To be discharged to Hospice. No Hd today .    Review of patient's allergies indicates:   Allergen Reactions    Acetaminophen      Itching    Oxycodone-acetaminophen      Other reaction(s): Itching    Ace inhibitors Other (See Comments)     cough     Current Facility-Administered Medications   Medication Frequency    0.9%  NaCl infusion (for blood administration) Q24H PRN    acetaminophen tablet 650 mg Q4H PRN    aspirin EC tablet 81 mg Daily    dextrose 10% (D10W) Bolus PRN    dextrose 10% (D10W) Bolus PRN    diphenhydrAMINE capsule 25 mg Q6H PRN    DOBUTamine 500mg in D5W 250mL infusion (premix) (NON-TITRATING) Continuous    ferrous sulfate EC tablet 325 mg Daily    glucagon (human recombinant) injection 1 mg PRN    glucose chewable tablet 16 g PRN    glucose chewable tablet 24 g PRN    isosorbide-hydrALAZINE 20-37.5 mg per tablet 1 tablet BID    melatonin tablet 6 mg Nightly PRN    methocarbamol tablet 500 mg TID PRN     methyl salicylate-menthol 15-10% cream TID PRN    morphine injection 4 mg Q6H PRN    ondansetron injection 8 mg Q8H PRN    oxyCODONE immediate release tablet 15 mg Q4H PRN    promethazine (PHENERGAN) 25 mg in dextrose 5 % 50 mL IVPB Q6H PRN    senna-docusate 8.6-50 mg per tablet 1 tablet BID PRN    sodium chloride 0.65 % nasal spray 1 spray PRN    warfarin tablet 7 mg Daily       Objective:     Vital Signs (Most Recent):  Temp: 98 °F (36.7 °C) (01/16/20 1448)  Pulse: 102 (01/16/20 1639)  Resp: 17 (01/16/20 1448)  BP: (!) 121/57 (01/16/20 1448)  SpO2: 96 % (01/16/20 1448)  O2 Device (Oxygen Therapy): room air (01/16/20 0734) Vital Signs (24h Range):  Temp:  [98 °F (36.7 °C)-98.7 °F (37.1 °C)] 98 °F (36.7 °C)  Pulse:  [] 102  Resp:  [17-20] 17  SpO2:  [93 %-97 %] 96 %  BP: (109-140)/(55-82) 121/57     Weight: 124.2 kg (273 lb 13 oz) (01/16/20 0500)  Body mass index is 40.43 kg/m².  Body surface area is 2.46 meters squared.    I/O last 3 completed shifts:  In: 2227.6 [P.O.:1740; I.V.:237.6; Blood:250]  Out: 3500 [Urine:3500]    Physical Exam   Constitutional: He is oriented to person, place, and time. No distress.   Sick appearing male   HENT:   Head: Normocephalic and atraumatic.   Right Ear: External ear normal.   Left Ear: External ear normal.   Nose: Nose normal.   Eyes: Pupils are equal, round, and reactive to light. EOM are normal. No scleral icterus.   Neck: JVD present. No tracheal deviation present.   Cardiovascular: Normal rate, regular rhythm and intact distal pulses. Exam reveals gallop.   Pulses:       Radial pulses are 2+ on the right side, and 2+ on the left side.   Pulmonary/Chest: Effort normal. No stridor. He has no wheezes. He has rales (bibasilar).   Abdominal: Soft. There is no tenderness. There is no guarding.   Musculoskeletal: He exhibits edema and tenderness.   Neurological: He is alert and oriented to person, place, and time.   asterixis   Skin: Skin is warm and dry. Rash  (chronic venous stasis ulceration to the lower extremities bilaterally) noted. He is not diaphoretic.   Psychiatric: He has a normal mood and affect. His behavior is normal.   Nursing note and vitals reviewed.      Significant Labs:  CBC:   Recent Labs   Lab 01/16/20  0304   WBC 14.94*   RBC 2.65*   HGB 6.8*   HCT 23.4*      MCV 88   MCH 25.7*   MCHC 29.1*     CMP:   Recent Labs   Lab 01/16/20  0304 01/16/20  1130   * 136*   CALCIUM 9.9 9.8   ALBUMIN 3.3*  --    PROT 8.4  --     141   K 3.3* 3.7   CO2 32* 29   CL 94* 96   * 113*   CREATININE 2.7* 2.8*   ALKPHOS 195*  --    ALT 16  --    AST 24  --    BILITOT 3.1*  --      All labs within the past 24 hours have been reviewed.     Significant Imaging:  Labs: Reviewed    Assessment/Plan:     * Acute on chronic combined systolic and diastolic congestive heart failure  27 yo male with HFrEF (25%), CKD 3, and multiple hospital admissions for heart failure exacerbations presenting this admission for acute on chronic heart failure. Despite aggressive diuresis with Lasix 120mg TID, decreased urinary output. He was later transitioned to lasix gtt at 30 mg/hr with multiple doses of diuril without improvement in UPO. Nephrology consult for volume overload refractory to diuresis.    Plan/Recommendation:  - on dobutamine gtt as per cardiology            Cardiorenal syndrome  Plan/Recommendation:  - no more HD given poor prognosis, pending hospice placement   - Would recommend continued medical management   - Continue to monitor strict In/outs, daily weights           Palliative care encounter  Palliative care and hospice currently following   - patient expressed wishes to transition to comfort care       Stage 3 chronic kidney disease  Mr. Terrell is a 53 y/o male with HFrEF (last 2D ECHO EF 23%, moderate MR, moderate TR, diastolic dysfunction), hypertension, chronic atrial fibrillation, a flutter status post ablation, CVA in 2009, coronary artery  disease, CKD III, and obstructive sleep apnea presents with ADCHF. Nephro consulted for JEAN-PIERRE on CKD in the setting of Cardiorenal syndrome.   - JEAN-PIERRE was thought to be to be multifactorial from labile BP, and component of cardiorenal syndrome  - Creatinine up trending since admission, baseline 1.6-1.9, currently 3.0   - Azotemia worsening daily. No uremic symptoms present.     Plan/Rec  - no more HD since patient is transitioning to hospice   - Strict I/O and chart  - Avoid nephrotoxic medications, NSAIDs, IV contrast, etc  - Daily weights and chart  - Medication doses adjusted to GFR  - maintaining good urine output at 2.0 liters over last 24 hours  - Will follow closely        Thank you for your consult. I will sign off. Please contact us if you have any additional questions.    Pankaj Finley MD  Nephrology  [facility]

## 2020-01-17 NOTE — ASSESSMENT & PLAN NOTE
Plan/Recommendation:  - no more HD given poor prognosis, pending hospice placement   - Would recommend continued medical management   - Continue to monitor strict In/outs, daily weights

## 2020-01-17 NOTE — PLAN OF CARE
MODESTA informed by Tu with [facility] that they will be providing the dobutamine for patient while on hospice care at home.     Vaishali Osorio LMSW  [facility]

## 2020-01-17 NOTE — PLAN OF CARE
[facility]  Department of Hospital Medicine  [address] [place] [address]  [phone] after hours  [phone] fax    HOSPICE  ORDERS    01/17/2020    Admit to Hospice:  Home Service    Diagnoses:   Active Hospital Problems    Diagnosis  POA    *Acute on chronic combined systolic and diastolic congestive heart failure [I50.43]  Yes    Open wound of right lower leg [S81.801A]  Yes    Cardiorenal syndrome [I13.10]  Yes    Palliative care encounter [Z51.5]  Not Applicable    Counseling regarding advanced care planning and goals of care [Z71.89]  Not Applicable    Benign hypertensive heart and kidney disease with HF and CKD [I13.0]  Yes    Elevated alkaline phosphatase level [R74.8]  Yes    Coronary artery disease [I25.10]  Yes     Chronic    Stage 3 chronic kidney disease [N18.3]  Yes    Subtherapeutic international normalized ratio (INR) [R79.1]  Yes    Personal history of cerebral embolism [Z86.79]  Not Applicable    Personal history of venous thrombosis and embolism [Z86.718]  Not Applicable    Personal history of MI (myocardial infarction) [I25.2]  Not Applicable    Essential hypertension [I10]  Yes     Chronic    Atrial fibrillation, chronic [I48.20]  Yes     Chronic    Chronic anticoagulation [Z79.01]  Not Applicable     Chronic    Cardiomyopathy [I42.9]  Yes     Chronic      Resolved Hospital Problems   No resolved problems to display.       Hospice Qualifying Diagnoses:       Patient has a life expectancy < 6 months due to:  1) Primary Hospice Diagnosis: End-stage heart failure  2) Comorbid Conditions Contributing to Decline: Chronic kidney disease (now stage 4), severe anemia of chronic disease, history of stroke, history of MI, history of atrial fibrillation    Vital Signs: Routine per Hospice Protocol.    Code Status: FULL    Allergies:   Review of patient's allergies indicates:   Allergen Reactions    Acetaminophen      Itching     Oxycodone-acetaminophen      Other reaction(s): Itching    Ace inhibitors Other (See Comments)     cough       Diet: Cardiac 1200 cc fluid restriction    Activities: As tolerated    Nursing: Per Hospice Routine.    Routine Skin for Bedridden Patients: Apply moisture barrier cream to all skin folds and   wet areas in perineal area daily and after baths and all bowel movements.    PICC Care:   Scrub the Hub: Prior to accessing the line, always perform a 30 second alcohol scrub  Each lumen of the central line is to be flushed at least daily with 10 mL Normal Saline and 3 mL Heparin flush (100 units/mL)  Skilled Nurse (SN) may draw blood from IV access    Blood draws:  Please draw INR at least weekly and adjust coumadin frequency of INR checks accordingly.    IV infusion needs:  Dobutamine 500 mg in D5W 250 mL infusion   2.5 mcg/kg/min at dosing weight 120 kg      Medications:   Hamlet Terrell   Home Medication Instructions ELKIN:[id number]    Printed on:01/17/20 7713   Medication Information                      albuterol (PROVENTIL/VENTOLIN HFA) 90 mcg/actuation inhaler  Inhale 1-2 puffs into the lungs every 6 (six) hours as needed for Wheezing. Rescue             aspirin (ECOTRIN) 81 MG EC tablet  Take 81 mg by mouth once daily.             DOBUTamine (DOBUTREX) 500 mg/250 mL (2,000 mcg/mL)  Inject 2.5 mcg/kg/min.             ferrous sulfate 325 (65 FE) MG EC tablet  Take 1 tablet (325 mg total) by mouth once daily.             furosemide (LASIX) 80 MG tablet  Take 1 tablet (80 mg total) by mouth 2 (two) times daily.             isosorbide-hydrALAZINE 20-37.5 mg (BIDIL) 20-37.5 mg Tab  Take 1 tablet by mouth 2 (two) times daily.             methocarbamol (ROBAXIN) 500 MG Tab  Take 1 tablet (500 mg total) by mouth 3 (three) times daily as needed.             nitroGLYCERIN (NITROSTAT) 0.4 MG SL tablet  Place 1 tablet (0.4 mg total) under the tongue every 5 (five) minutes as needed for Chest pain. Tablet, Sublingual  Sublingual             ondansetron (ZOFRAN) 4 MG tablet  Take 1 tablet (4 mg total) by mouth every 8 (eight) hours as needed for Nausea.             oxyCODONE (ROXICODONE) 15 MG Tab  Take 1 tablet (15 mg total) by mouth every 4 (four) hours as needed for Pain.             senna-docusate 8.6-50 mg (PERICOLACE) 8.6-50 mg per tablet  Take 1 tablet by mouth 2 (two) times daily as needed for Constipation.             warfarin (COUMADIN) 1 MG tablet  Take 7 tablets (7 mg total) by mouth Daily.               Future Orders:  Hospice Medical Director may dictate new orders for comfortable care measures & sign death certificate.        _________________________________  Markus Howard MD  01/17/2020

## 2020-01-17 NOTE — PLAN OF CARE
[facility]  Palliative Care   Follow Up Note:    Patient Name: Hamlet Terrell  MRN: [id number]  Admission Date: 12/24/2019  Hospital Length of Stay: 24 days  Code Status: Full Code   Attending Provider: Markus Howard MD  Palliative Care Provider: Vira Davila NP  Primary Care Physician: Carlin Swift MD  Principal Problem:Acute on chronic combined systolic and diastolic congestive heart failure     Chart reviewed. Per Chart, pt has decided to discharge home with hospice care.     SW made visit to pt's room. Pt confirmed that he has decided to stop dialysis and go home with hospice and . Pt stated that he agreed to receive PICC line today in hopes to discharge as soon as possible. Pt asked for a popsicle or ice cream. SW to ask RN. Encouraged pt to talk to his family.    MODESTA spoke with primary  Mary Ellen Valencia LMSW, and informed her of pt's wishes to go home. Mary Ellen to call [facility] with [facility].    Spoke with Dr. Howard who stated pt would be getting PICC today.     Brooke Apple, THOMAS, ACHP-MODESTA

## 2020-01-17 NOTE — ASSESSMENT & PLAN NOTE
Palliative care and hospice currently following   - patient expressed wishes to transition to comfort care

## 2020-01-17 NOTE — CONSULTS
Double lumen PICC placed in right basilic vein of RUE, 39cm in length with 0cm exposed and 36cm arm circumference. Lot#[id number]

## 2020-01-17 NOTE — NURSING
D/C IJ with catheter intact, applied pressure with 4x4  vaseline gauze, with 4x4 gauze and tegaderm. Pt unable to tolerate lying completely flat. One thin pillow placed under head, bed raised 15 degrees. Instructed Pt this was as high as his head could be raised and if he needs to cough to apply pressure on drsg while coughing. Night RN notified Pt can sit up at 1940.

## 2020-01-17 NOTE — PLAN OF CARE
Pt remained free of injuries, falls, and trauma. VS stable. No complaints of sob.  Pt complained of pain to back. Pain managed with PRN oxycodone. R IJ trialyis removed during day shift. Site dressed with gauze and transparent film. Dressing CDI no bleeding noted. Pt on  gtt per MAR orders. Pt tolerating well. I/Os being monitored. Plan for pt to get PICC in am and be sent home on home hospice with  gtt. Plan of care reviewed with pt. All questions and concerns addressed. Will continue to monitor.

## 2020-01-17 NOTE — NURSING
Pt and Pt family educated on transitioning home, Pt and Pt family verbalized understanding, no falls noted during stay, will continue to monitor Pt.

## 2020-01-17 NOTE — PLAN OF CARE
01/17/20 1353   Post-Acute Status   Post-Acute Authorization Home Health/Hospice   Home Health/Hospice Status Set-up Complete     Patient has been set up with [facility] for hospice services upon discharge. DELMI is tomorrow 1/18/2020. MODESTA informed Remy ([phone]) at [facility] of the discharge tomorrow. MODESTA also confimred with Remy that the PICC line had been placed. MODESTA will send over hospice orders once they are put in the chart by the doctor. Remy said that the SW/CM handling things over the weekend could reach out to her if anything was needed. Patient will likely need transportation home set up upon discharge.     Mary Ellen Valencia, MODESTA  [facility]  Ext. [phone]

## 2020-01-17 NOTE — SUBJECTIVE & OBJECTIVE
Interval History: Patient seen and examined at bedside, refused line placement after talking with primary regarding long term prognosis. To be discharged to Hospice. No Hd today .    Review of patient's allergies indicates:   Allergen Reactions    Acetaminophen      Itching    Oxycodone-acetaminophen      Other reaction(s): Itching    Ace inhibitors Other (See Comments)     cough     Current Facility-Administered Medications   Medication Frequency    0.9%  NaCl infusion (for blood administration) Q24H PRN    acetaminophen tablet 650 mg Q4H PRN    aspirin EC tablet 81 mg Daily    dextrose 10% (D10W) Bolus PRN    dextrose 10% (D10W) Bolus PRN    diphenhydrAMINE capsule 25 mg Q6H PRN    DOBUTamine 500mg in D5W 250mL infusion (premix) (NON-TITRATING) Continuous    ferrous sulfate EC tablet 325 mg Daily    glucagon (human recombinant) injection 1 mg PRN    glucose chewable tablet 16 g PRN    glucose chewable tablet 24 g PRN    isosorbide-hydrALAZINE 20-37.5 mg per tablet 1 tablet BID    melatonin tablet 6 mg Nightly PRN    methocarbamol tablet 500 mg TID PRN    methyl salicylate-menthol 15-10% cream TID PRN    morphine injection 4 mg Q6H PRN    ondansetron injection 8 mg Q8H PRN    oxyCODONE immediate release tablet 15 mg Q4H PRN    promethazine (PHENERGAN) 25 mg in dextrose 5 % 50 mL IVPB Q6H PRN    senna-docusate 8.6-50 mg per tablet 1 tablet BID PRN    sodium chloride 0.65 % nasal spray 1 spray PRN    warfarin tablet 7 mg Daily       Objective:     Vital Signs (Most Recent):  Temp: 98 °F (36.7 °C) (01/16/20 1448)  Pulse: 102 (01/16/20 1639)  Resp: 17 (01/16/20 1448)  BP: (!) 121/57 (01/16/20 1448)  SpO2: 96 % (01/16/20 1448)  O2 Device (Oxygen Therapy): room air (01/16/20 0734) Vital Signs (24h Range):  Temp:  [98 °F (36.7 °C)-98.7 °F (37.1 °C)] 98 °F (36.7 °C)  Pulse:  [] 102  Resp:  [17-20] 17  SpO2:  [93 %-97 %] 96 %  BP: (109-140)/(55-82) 121/57     Weight: 124.2 kg (273 lb 13 oz)  (01/16/20 0500)  Body mass index is 40.43 kg/m².  Body surface area is 2.46 meters squared.    I/O last 3 completed shifts:  In: 2227.6 [P.O.:1740; I.V.:237.6; Blood:250]  Out: 3500 [Urine:3500]    Physical Exam   Constitutional: He is oriented to person, place, and time. No distress.   Sick appearing male   HENT:   Head: Normocephalic and atraumatic.   Right Ear: External ear normal.   Left Ear: External ear normal.   Nose: Nose normal.   Eyes: Pupils are equal, round, and reactive to light. EOM are normal. No scleral icterus.   Neck: JVD present. No tracheal deviation present.   Cardiovascular: Normal rate, regular rhythm and intact distal pulses. Exam reveals gallop.   Pulses:       Radial pulses are 2+ on the right side, and 2+ on the left side.   Pulmonary/Chest: Effort normal. No stridor. He has no wheezes. He has rales (bibasilar).   Abdominal: Soft. There is no tenderness. There is no guarding.   Musculoskeletal: He exhibits edema and tenderness.   Neurological: He is alert and oriented to person, place, and time.   asterixis   Skin: Skin is warm and dry. Rash (chronic venous stasis ulceration to the lower extremities bilaterally) noted. He is not diaphoretic.   Psychiatric: He has a normal mood and affect. His behavior is normal.   Nursing note and vitals reviewed.      Significant Labs:  CBC:   Recent Labs   Lab 01/16/20  0304   WBC 14.94*   RBC 2.65*   HGB 6.8*   HCT 23.4*      MCV 88   MCH 25.7*   MCHC 29.1*     CMP:   Recent Labs   Lab 01/16/20  0304 01/16/20  1130   * 136*   CALCIUM 9.9 9.8   ALBUMIN 3.3*  --    PROT 8.4  --     141   K 3.3* 3.7   CO2 32* 29   CL 94* 96   * 113*   CREATININE 2.7* 2.8*   ALKPHOS 195*  --    ALT 16  --    AST 24  --    BILITOT 3.1*  --      All labs within the past 24 hours have been reviewed.     Significant Imaging:  Labs: Reviewed

## 2020-01-17 NOTE — ASSESSMENT & PLAN NOTE
Mr. Terrell is a 53 y/o male with HFrEF (last 2D ECHO EF 23%, moderate MR, moderate TR, diastolic dysfunction), hypertension, chronic atrial fibrillation, a flutter status post ablation, CVA in 2009, coronary artery disease, CKD III, and obstructive sleep apnea presents with ADCHF. Nephro consulted for JEAN-PIERRE on CKD in the setting of Cardiorenal syndrome.   - JEAN-PIERRE was thought to be to be multifactorial from labile BP, and component of cardiorenal syndrome  - Creatinine up trending since admission, baseline 1.6-1.9, currently 3.0   - Azotemia worsening daily. No uremic symptoms present.     Plan/Rec  - no more HD since patient is transitioning to hospice   - Strict I/O and chart  - Avoid nephrotoxic medications, NSAIDs, IV contrast, etc  - Daily weights and chart  - Medication doses adjusted to GFR  - maintaining good urine output at 2.0 liters over last 24 hours  - Will follow closely

## 2020-01-17 NOTE — PROGRESS NOTES
Pt had a 14 beat run of VTach. Pt asypmtomatic. VS stable. No complaints from pt. Pt resting in chair at this time. Last K level 3.7 on 1/16/20 at 1130. Mag level 2.3 1/16/20. Gaurav SORIANO [facility] notified and made aware. Pt has been having small runs of VT since admission to unit. MD stated to see what morning labs result since they will be drawn in near future and we will treat from there. Will continue to monitor from this point.

## 2020-01-17 NOTE — PROCEDURES
"Hamlet Terrell is a 54 y.o. male patient.    Temp: 97.4 °F (36.3 °C) (01/17/20 1148)  Pulse: 73 (01/17/20 1148)  Resp: 18 (01/17/20 1148)  BP: (!) 124/55 (01/17/20 1148)  SpO2: (!) 94 % (01/17/20 1148)  Weight: 120.2 kg (264 lb 15.9 oz) (01/17/20 0411)  Height: 5' 9" (175.3 cm) (12/25/19 0027)    PICC  Date/Time: 1/17/2020 12:51 PM  Performed by: Sathish Hannon RN  Assisting provider: Mary Billings LPN  Consent Done: Yes  Time out: Immediately prior to procedure a time out was called to verify the correct patient, procedure, equipment, support staff and site/side marked as required  Indications: med administration and vascular access  Anesthesia: local infiltration  Local anesthetic: lidocaine 1% without epinephrine  Anesthetic Total (mL): 3  Preparation: skin prepped with ChloraPrep  Skin prep agent dried: skin prep agent completely dried prior to procedure  Sterile barriers: all five maximum sterile barriers used - cap, mask, sterile gown, sterile gloves, and large sterile sheet  Hand hygiene: hand hygiene performed prior to central venous catheter insertion  Location details: right basilic  Catheter type: double lumen  Catheter size: 5 Fr  Catheter Length: 39cm    Ultrasound guidance: yes  Vessel Caliber: medium and patent, compressibility normal  Vascular Doppler: not done  Needle advanced into vessel with real time Ultrasound guidance.  Guidewire confirmed in vessel.  Image recorded and saved.  Sterile sheath used.  Number of attempts: 1  Post-procedure: blood return through all ports, chlorhexidine patch and sterile dressing applied  Technical procedures used: 3cg  Specimens: No  Implants: No  Assessment: placement verified by x-ray  Complications: none          Mary Billings  1/17/2020  "

## 2020-01-18 VITALS
SYSTOLIC BLOOD PRESSURE: 122 MMHG | BODY MASS INDEX: 39.64 KG/M2 | WEIGHT: 267.63 LBS | TEMPERATURE: 98 F | DIASTOLIC BLOOD PRESSURE: 57 MMHG | HEIGHT: 69 IN | OXYGEN SATURATION: 91 % | HEART RATE: 121 BPM | RESPIRATION RATE: 16 BRPM

## 2020-01-18 LAB
ALBUMIN SERPL BCP-MCNC: 3.1 G/DL (ref 3.5–5.2)
ALP SERPL-CCNC: 193 U/L (ref 55–135)
ALT SERPL W/O P-5'-P-CCNC: 16 U/L (ref 10–44)
ANION GAP SERPL CALC-SCNC: 15 MMOL/L (ref 8–16)
ANISOCYTOSIS BLD QL SMEAR: SLIGHT
AST SERPL-CCNC: 29 U/L (ref 10–40)
BASOPHILS # BLD AUTO: 0.04 K/UL (ref 0–0.2)
BASOPHILS NFR BLD: 0.4 % (ref 0–1.9)
BILIRUB SERPL-MCNC: 1.8 MG/DL (ref 0.1–1)
BUN SERPL-MCNC: 111 MG/DL (ref 6–20)
CALCIUM SERPL-MCNC: 9.7 MG/DL (ref 8.7–10.5)
CHLORIDE SERPL-SCNC: 93 MMOL/L (ref 95–110)
CO2 SERPL-SCNC: 31 MMOL/L (ref 23–29)
CREAT SERPL-MCNC: 2.7 MG/DL (ref 0.5–1.4)
DIFFERENTIAL METHOD: ABNORMAL
EOSINOPHIL # BLD AUTO: 0.6 K/UL (ref 0–0.5)
EOSINOPHIL NFR BLD: 6.1 % (ref 0–8)
ERYTHROCYTE [DISTWIDTH] IN BLOOD BY AUTOMATED COUNT: 20.8 % (ref 11.5–14.5)
EST. GFR  (AFRICAN AMERICAN): 29.8 ML/MIN/1.73 M^2
EST. GFR  (NON AFRICAN AMERICAN): 25.7 ML/MIN/1.73 M^2
GLUCOSE SERPL-MCNC: 148 MG/DL (ref 70–110)
HCT VFR BLD AUTO: 22.7 % (ref 40–54)
HGB BLD-MCNC: 6.7 G/DL (ref 14–18)
HYPOCHROMIA BLD QL SMEAR: ABNORMAL
IMM GRANULOCYTES # BLD AUTO: 0.13 K/UL (ref 0–0.04)
IMM GRANULOCYTES NFR BLD AUTO: 1.4 % (ref 0–0.5)
INR PPP: 1.6 (ref 0.8–1.2)
LYMPHOCYTES # BLD AUTO: 0.7 K/UL (ref 1–4.8)
LYMPHOCYTES NFR BLD: 7.2 % (ref 18–48)
MAGNESIUM SERPL-MCNC: 2.3 MG/DL (ref 1.6–2.6)
MCH RBC QN AUTO: 26.4 PG (ref 27–31)
MCHC RBC AUTO-ENTMCNC: 29.5 G/DL (ref 32–36)
MCV RBC AUTO: 89 FL (ref 82–98)
MONOCYTES # BLD AUTO: 2 K/UL (ref 0.3–1)
MONOCYTES NFR BLD: 20.9 % (ref 4–15)
NEUTROPHILS # BLD AUTO: 6 K/UL (ref 1.8–7.7)
NEUTROPHILS NFR BLD: 64 % (ref 38–73)
NRBC BLD-RTO: 0 /100 WBC
OVALOCYTES BLD QL SMEAR: ABNORMAL
PHOSPHATE SERPL-MCNC: 3.1 MG/DL (ref 2.7–4.5)
PLATELET # BLD AUTO: 246 K/UL (ref 150–350)
PLATELET BLD QL SMEAR: ABNORMAL
PMV BLD AUTO: 10.4 FL (ref 9.2–12.9)
POIKILOCYTOSIS BLD QL SMEAR: SLIGHT
POLYCHROMASIA BLD QL SMEAR: ABNORMAL
POTASSIUM SERPL-SCNC: 3.5 MMOL/L (ref 3.5–5.1)
PROT SERPL-MCNC: 8.1 G/DL (ref 6–8.4)
PROTHROMBIN TIME: 16 SEC (ref 9–12.5)
RBC # BLD AUTO: 2.54 M/UL (ref 4.6–6.2)
SODIUM SERPL-SCNC: 139 MMOL/L (ref 136–145)
TARGETS BLD QL SMEAR: ABNORMAL
WBC # BLD AUTO: 9.34 K/UL (ref 3.9–12.7)

## 2020-01-18 PROCEDURE — 84100 ASSAY OF PHOSPHORUS: CPT

## 2020-01-18 PROCEDURE — 25000003 PHARM REV CODE 250: Performed by: HOSPITALIST

## 2020-01-18 PROCEDURE — 63600175 PHARM REV CODE 636 W HCPCS: Performed by: HOSPITALIST

## 2020-01-18 PROCEDURE — 85610 PROTHROMBIN TIME: CPT

## 2020-01-18 PROCEDURE — 36415 COLL VENOUS BLD VENIPUNCTURE: CPT

## 2020-01-18 PROCEDURE — 80053 COMPREHEN METABOLIC PANEL: CPT

## 2020-01-18 PROCEDURE — 85025 COMPLETE CBC W/AUTO DIFF WBC: CPT

## 2020-01-18 PROCEDURE — 83735 ASSAY OF MAGNESIUM: CPT

## 2020-01-18 PROCEDURE — 99239 PR HOSPITAL DISCHARGE DAY,>30 MIN: ICD-10-PCS | Mod: ,,, | Performed by: HOSPITALIST

## 2020-01-18 PROCEDURE — 99239 HOSP IP/OBS DSCHRG MGMT >30: CPT | Mod: ,,, | Performed by: HOSPITALIST

## 2020-01-18 RX ADMIN — ASPIRIN 81 MG: 81 TABLET, COATED ORAL at 08:01

## 2020-01-18 RX ADMIN — FERROUS SULFATE TAB EC 325 MG (65 MG FE EQUIVALENT) 325 MG: 325 (65 FE) TABLET DELAYED RESPONSE at 08:01

## 2020-01-18 RX ADMIN — DOBUTAMINE IN DEXTROSE 2.5 MCG/KG/MIN: 200 INJECTION, SOLUTION INTRAVENOUS at 07:01

## 2020-01-18 RX ADMIN — FUROSEMIDE 80 MG: 80 TABLET ORAL at 08:01

## 2020-01-18 RX ADMIN — FUROSEMIDE 80 MG: 80 TABLET ORAL at 05:01

## 2020-01-18 RX ADMIN — WARFARIN SODIUM 7 MG: 5 TABLET ORAL at 05:01

## 2020-01-18 RX ADMIN — HYDRALAZINE HYDROCHLORIDE AND ISOSORBIDE DINITRATE 1 TABLET: 37.5; 2 TABLET, FILM COATED ORAL at 08:01

## 2020-01-18 NOTE — PLAN OF CARE
Pt remained free of injuries, falls, and trauma. VS stable. No complaints of sob.  Pt complained of pain to back. Pain managed with PRN oxycodone.  Pt on  gtt per MAR orders. Pt tolerating well. I/Os being monitored. Pt had PICC inserted during day shift. Plan for pt to be sent home on home hospice with  gtt. Plan of care reviewed with pt. All questions and concerns addressed. Will continue to monitor.

## 2020-01-18 NOTE — PROGRESS NOTES
"[facility]  Adult Nutrition  Progress Note    SUMMARY       Recommendations    Recommendation:   1. Continue current diet as tolerated, with fluid restriction per MD. Encourage PO intake.   2. RD to monitor & follow up.    Goals: Pt to meet % EEN and EPN by RD follow-up.   Nutrition Goal Status: progressing towards goal  Communication of RD Recs: other (comment)(POC)    Reason for Assessment    Reason For Assessment: RD follow-up  Diagnosis: cardiac disease(HF)  Relevant Medical History: HF, CAD  General Information Comments: Pt reports his appetite has been "off and on" and reports he has been consuming almost all of his meals. Variable PO intake per RN documentation. Wt loss since admission likely fluid, as pt is -19.9L. Pt continues to appear nourished. Pt to d/c with home hospice per chart.  Nutrition Discharge Planning: Adequate PO intake for optimal nutrition.     Nutrition Risk Screen    Nutrition Risk Screen: no indicators present    Nutrition/Diet History    Spiritual, Cultural Beliefs, Cheondoism Practices, Values that Affect Care: no  Factors Affecting Nutritional Intake: None identified at this time    Anthropometrics    Temp: 96.2 °F (35.7 °C)  Height Method: Stated  Height: 5' 9" (175.3 cm)  Height (inches): 69 in  Weight Method: Standard Scale(standing wt)  Weight: 120.2 kg (264 lb 15.9 oz)  Weight (lb): 265 lb  Ideal Body Weight (IBW), Male: 160 lb  % Ideal Body Weight, Male (lb): 177.89 %  BMI (Calculated): 39.1  BMI Grade: greater than 40 - morbid obesity  Usual Body Weight (UBW), k kg(dry wt)  % Usual Body Weight: 106.24  Weight Loss Since Admission: (fluid related)    Lab/Procedures/Meds    Pertinent Labs Reviewed: reviewed  Pertinent Labs Comments: , Cr 2.6, GFR 31.2, Glu 152, Alb 3.2  Pertinent Medications Reviewed: reviewed  Pertinent Medications Comments: dobutamine, ferrous sulfate, methocarbamol, phenergan, senna-docusate    Estimated/Assessed " Needs    Weight Used For Calorie Calculations: 120.2 kg (264 lb 15.9 oz)  Energy Calorie Requirements (kcal): 2445 kcal/day  Energy Need Method: Davin-St Jeor(x 1.2 PAL)  Protein Requirements:  g/day(0.8-1 g/kg)  Weight Used For Protein Calculations: 120.2 kg (264 lb 15.9 oz)  Fluid Requirements (mL): per MD or 1 mL/kcal     RDA Method (mL): 2445    Nutrition Prescription Ordered    Current Diet Order: Low Na, renal  Nutrition Order Comments: 1000 mL FR  Oral Nutrition Supplement: -    Evaluation of Received Nutrient/Fluid Intake    I/O: -19.9L since admit  Comments: LBM 1/15  Tolerance: tolerating  % Intake of Estimated Energy Needs: 50 - 75 %  % Meal Intake: 50 - 75 %    Nutrition Risk    Level of Risk/Frequency of Follow-up: low(1X/week)     Assessment and Plan    Nutrition Problem  Inadequate energy intake     Related to (etiology):   Decreased PO intake     Signs and Symptoms (as evidenced by):   Pt consuming 25-50% of meals in the past 2 days (1/8-1/9)     Interventions (treatment strategy):  Collaboration with other providers  Modified diet - cardiac, renal  Commercial beverage - Novasource Renal or Optisource if PO intake does not improve     Nutrition Diagnosis Status:   Continues, PO intake increasing    Monitor and Evaluation    Food and Nutrient Intake: energy intake, food and beverage intake  Food and Nutrient Adminstration: diet order  Anthropometric Measurements: weight, weight change  Biochemical Data, Medical Tests and Procedures: other (specify)(All labs)  Nutrition-Focused Physical Findings: overall appearance     Malnutrition Assessment  Pt does not meet criteria for malnutrition at this time.    Nutrition Follow-Up    RD Follow-up?: Yes

## 2020-01-18 NOTE — PLAN OF CARE
MODESTA contacted Suha with [facility] to inform that pt will be discharged home today. Suha states the dobutamine drip will be delivered to pt's bedside and hooked up before pt leaves the hospital.    MODESTA left message for Remy with [facility] to inform of pt's discharge. MODESTA waiting to hear back.     2:04 PM  MODESTA spoke with [facility] rep ([phone]) to inform that patient will discharge today. Rep would like SW to inform her once patient leaves the the hospital.    4:04 PM  Maximiliano with [facility] taught pt about the dobutamine drip for home. Pt's brother will transport patient home.     RN will contact [facility] once patient leaves the hospital. [facility] rep will meet patient at the home.     Vaishali Osorio LMSW  [facility]

## 2020-01-18 NOTE — DISCHARGE SUMMARY
Discharge Summary  Hospital Medicine    Attending Provider on Discharge: Markus Howard     Discharging Team: 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    Elevated alkaline phosphatase level    Coronary artery disease    Stage 3 chronic kidney disease    Subtherapeutic international normalized ratio (INR)    Personal history of cerebral embolism    Personal history of venous thrombosis and embolism    Personal history of MI (myocardial infarction)    Essential hypertension    Atrial fibrillation, chronic    Chronic anticoagulation    Cardiomyopathy        Hospital Course:    HPI (See H&P for complete P,F,SHx):  54M with chronic combined systolic and diastolic heart failure (EF 25%), afib on coumadin, CAD, CVA, and history of PE here for evaluation of acute on chronic combined HF. Patient presents to the ED for evaluation of shortness of breath and edema.  He was just admitted to Hospital Medicine from 11/24-12/8 for a CHF exacerbation. During that admission, he was evaluated by Cardiology who started him on Dobutamine and Diuril.  He was resistant to HTS evaluation during that stay, which included recommendations for LVAD, transplant, and ICD.  He was discharged home on Lasix 80mg PO BID. He endorses compliance with his diuretic regimen, but despite this and following a low salt diet, he has continued to gain weight, have lower extremity and abdominal swelling, as well as shortness of  breath.  He is so short of breath that he has to sleep in a chair, and has been unable to sleep because of his breathing.  He denies any chest pain.  He does have a cough that is mostly dry, but occasionally productive of blood tinged sputum.  He was discharged with a weight of 259lbs, and claims that is around his dry weight.  He is currently 286lbs.       Upon arrival to the ED, vitals were /83, HR 88, RR 22.  Labs showed Hemoglobin 7.3, INR 1.5, Creatinine 1.8, Alk Phos 278,  with Trop 0.128. CXR showed cardiomegaly and pulmonary edema, along with a masslike opacity. CT chest was attempted to be ordered to evaluate the mass, but he was unable to lie flat to get it evaluated.  He was given Lasix 80mg IV and was admitted to Hospital Medicine for further management.     TTE 11/27/2019  · Severely decreased LVSF.  · The estimated ejection fraction is 25%  · Concentric left ventricular hypertrophy.  · Global hypokinetic wall motion.  · Moderate right ventricular enlargement.  · Moderately reduced RVSF.  · Severe biatrial enlargement.  · Moderate mitral regurgitation.  · Moderate tricuspid regurgitation.  · The estimated PA systolic pressure is 40 mm Hg  · Elevated central venous pressure (15 mm Hg).  · Atrial fibrillation observed.     Hospital course:  Patient started on IV diuretics and  infusion for diagnosis of acute on chronic combined heart failure. His weight was stagnant and UOP tapered, so he was transitioned to IVP lasix for IV lasix gtt as this was actually effective during prior admission under guidance from co-management cardiology. However, the patient actually worsened symptomatically and TBili increased along with Cr and weight increase. The patient was then started back on lasix infusion with diuril augmentation. The patient's course was c/b epistaxis, possibly related to his decompensated picture. He required pRBC transfusion. Nephrology had initiated temporary dialysis for the  "patient as he did initially not want to go home with hospice. The patient, after much discussion with primary team and with palliative medicine, along with further discussion that HD would be temporizing as he did not want long term HD, then became agreeable for plans to go home with hospice.  A PICC line was placed for home  on 1/17. Oral diuretics were initiated. Plan is for discharge home with hospice on 1/18.  Multiple discussions >30 minutes over several days were crucial in helping patient understand his prognosis and getting to know his goals of care.  All questions answered to patient/family satisfaction.  Please see below for further details:    Review of Systems   Constitutional: Negative for chills and fever.   HENT: Negative for congestion and sore throat.    Eyes: Negative for photophobia, pain and discharge.   Respiratory: Negative for cough, hemoptysis, sputum production and shortness of breath.    Cardiovascular: Negative for chest pain, palpitations and leg swelling.   Gastrointestinal: Negative for abdominal pain, diarrhea, nausea and vomiting.   Genitourinary: Negative for dysuria and urgency.   Musculoskeletal: Negative for myalgias and neck pain.   Skin: Negative for itching and rash.   Neurological: Negative for sensory change, focal weakness and headaches.   Endo/Heme/Allergies: Negative for polydipsia. Does not bruise/bleed easily.   Psychiatric/Behavioral: Negative for depression and suicidal ideas.     BP (!) 122/57   Pulse (!) 121   Temp 97.5 °F (36.4 °C)   Resp 16   Ht 5' 9" (1.753 m)   Wt 121.4 kg (267 lb 10.2 oz)   SpO2 (!) 91%   BMI 39.52 kg/m²    General Appearance:  Comfortable, well-appearing, in no acute distress and not in pain.    Vital signs: (most recent): Blood pressure (!) 124/59, pulse (!) 116, temperature 98.1 °F (36.7 °C), temperature source Oral, resp. rate 17, height 5' 9" (1.753 m), weight 120.2 kg (264 lb 15.9 oz), SpO2 96 %.  No fever.    Output: Producing " urine and producing stool.    HEENT: Normal HEENT exam.  (+JVD)    Lungs:  Normal effort.  Breath sounds clear to auscultation.  He is not in respiratory distress.  There are rales.  No wheezes.    Heart: Normal rate.  Regular rhythm.  S1 normal and S2 normal.  Positive for murmur.    Chest: Symmetric chest wall expansion.   Abdomen: Abdomen is soft.  Bowel sounds are normal.   There is no abdominal tenderness.     Extremities: Normal range of motion.  There is venous stasis and dependent edema.  There is no deformity, effusion or local swelling.    Pulses: Distal pulses are intact.    Neurological: Patient is alert and oriented to person, place and time.  Normal strength.    Pupils:  Pupils are equal, round, and reactive to light.    Skin:  Warm and dry.       Acute on Chronic Combined Systolic and Diastolic Heart Failure  Cardiomyopathy  Patient with advanced heart failure and refuses discussion for advanced options with HTS. Patient without proper response to diuretics and has proven to be  dependent. Patient with progressive renal dysfunction yet refuses outpatient HD, making any attempts at inpatient HD temporizing given the severity of his cardiac dysfunction. Given his clinical picture the patient is deemed hospice appropriate and will transition to home with hospice care.  · Reinitiate lasix 80 mg BID today  · Cont  gtt 2.5, which will be continued at home.   · PICC line placed 1/17  · Strict I&Os with daily weights.  · Hold BB, okay to c/w BiDil     JEAN-PIERRE on CKD3  · Baseline 1.8  · Cardiorenal   · BUN has been rising  · S/p HD session on 1/12  · Patient now accepting of home with hospice plan  · No indication at present time for further HD as treatments would be only temporizing and goal is to maximize patient's time at home/quality of life     Chronic AFib  Long Term Use of Anticoagulants  Supratherapeutic INR  · Chronic issue  · HR in 100-120's  · INR to be managed in hospice setting     Essential  HTN  · Chronic and stable  · Continue Bidil BID     CAD  History of MI  · Chronic and stable  · Continue Aspirin 81mg PO daily  · Continue Bidil BID     Anemia  Epistaxis, intermittent  · Patient with H/H < 7 but will defer further transfusion as not symptomatic and will only add additional fluid which may make patient uncomfortable and not promote quality of life  · No evidence of active bleeding  · Multiple pRBC transfusions provided over course of hospitalization for patient with epistaxis, intermittent     History of VTE  History of Stroke  · Chronic and stable  · Continue Aspirin 81mg PO daily  · Continue Warfarin when INR appropriate      Elevated Alk Phos  · Chronic and stable  · Monitor     Benign Hypertensive Heart and Kidney Disease with HF and CKD  · As above     Abnormal CT  · Needs CT chest when able to lie flat or outpt to evaluate masslike opacity seen on CXR, however, given plan for home with hospice discharge no further w/u warranted for abnormal CT    Significant Diagnostic Studies:   Labs:   Recent Labs     01/18/20  0547   WBC 9.34   RBC 2.54*   HGB 6.7*   HCT 22.7*      MCV 89   MCH 26.4*   MCHC 29.5*   GRAN 64.0  6.0   LYMPH 7.2*  0.7*   MONO 20.9*  2.0*   EOS 0.6*      Recent Labs     01/18/20  0547   *      K 3.5   CL 93*   CO2 31*   *   CREATININE 2.7*   CALCIUM 9.7   ANIONGAP 15   MG 2.3   PHOS 3.1        Microbiology:   Blood Culture, Routine   Date Value Ref Range Status   01/05/2020 No growth after 5 days.  Final   01/05/2020 No growth after 5 days.  Final     Radiology:   Results for orders placed during the hospital encounter of 12/24/19   X-Ray Chest 1 View for PICC_Central line    Narrative EXAMINATION:  XR CHEST 1 VIEW    CLINICAL HISTORY:  Evaluate PICC line placement;    TECHNIQUE:  Single frontal view of the chest was performed.    COMPARISON:  Non 01/11/2020 e    FINDINGS:  Right-sided PICC line in the SVC.  Cardiomegaly.  Rounded opacities at the  lung bases similar to the previous study.  There is also a pleural effusion and associated volume loss.  The left lung is clear.  No significant changes.      Electronically signed by: Jeffrey Clark MD  Date:    01/17/2020  Time:    13:53      Results for orders placed during the hospital encounter of 11/24/19   X-Ray Chest PA And Lateral    Narrative EXAMINATION:  XR CHEST PA AND LATERAL    CLINICAL HISTORY:  Shortness of breath    TECHNIQUE:  PA and lateral views of the chest were performed.    COMPARISON:  November 24, 2019 at 13:21    FINDINGS:  Heart remains enlarged.  Pulmonary vascularity is similar.  Lobulated 12 cm opacity on the lateral again may represent some loculated fluid.  This has developed since the prior x-ray of October 23.  Persistent increased opacification at the right base.      Impression Somewhat lobulated oval opacity right midlung field presumably loculated fluid as it is new compared to October 2019.  If further evaluation needed CT suggested.      Electronically signed by: Cristofer Roldan MD  Date:    11/24/2019  Time:    14:45      Results for orders placed during the hospital encounter of 08/12/17   CT Abdomen Pelvis With Contrast    Narrative Time of Procedure: 08/12/17 22:22:25  Accession # [id number]    CT abdomen and pelvis    Technique: The patient was surveyed from the lung bases through the pelvis after the administration of 75 cc Omni 350 IV contrast. The data was reconstructed for coronal, sagittal, and axial images.    Clinical indication: 51-year-old male with history of right lower quadrant pain.    Comparison: CT abdomen pelvis 9/22/2013.    Findings:    There are small bilateral pleural effusions with associated atelectatic changes in the lung bases.      The heart is enlarged. No evidence of pericardial effusion.    The liver is enlarged and there are mild morphologic changes of chronic liver disease including lobar redistribution and mildly nodular contour. No focal hepatic  lesions are seen. There is reflux of contrast into hepatic veins which can be seen with right-sided heart failure. The gallbladder is unremarkable.  No intrahepatic or extrahepatic biliary ductal dilatation is identified. The spleen is nonenlarged.      The stomach, pancreas, and adrenal glands are unremarkable.    The kidneys are normal in size and location. There is a subcentimeter cyst in the midpole of the right kidney. No hydronephrosis is seen.  The ureters appear normal in course and caliber.    Scattered colonic diverticula are identified. The visualized loops of small and large bowel show no evidence of obstruction or inflammation. The appendix is identified and appears normal.  There is a small amount of free fluid in the abdomen and pelvis. No intraperitoneal free air is noted.    There is no evidence of lymph node enlargement in the abdomen. Mildly prominent bilateral inguinal lymph nodes measuring up to 1.6 cm in short axis.    The abdominal aorta is normal in course and caliber without significant atherosclerotic calcifications.    Urinary bladder is predominantly decompressed but demonstrates symmetric wall thickening on the sagittal images.    The osseous structures demonstrate demonstrate age-appropriate degenerative changes.      The extraperitoneal soft tissues demonstrate mild generalized body wall edema.    Impression No acute process identified in the abdomen and pelvis.    Morphologic changes of chronic liver disease/early cirrhosis. Small volume ascites.    Suspect urinary bladder wall thickening, which could be exaggerated by under distention. Suggest correlation with urinalysis and clinical symptoms.    Colonic diverticulosis without evidence of diverticulitis.    Cardiomegaly with findings consistent with 3rd spacing of fluid/volume overload.      ______________________________________     Electronically signed by resident: PATRICE OSCAR MD  Date:      08/12/17  Time:    22:52            As the supervising and teaching physician, I personally reviewed the images and resident's interpretation and I agree with the findings.          Electronically signed by: Gage Oleary  Date:     08/12/17  Time:    23:18       Results for orders placed during the hospital encounter of 07/02/19   CT Head Without Contrast    Narrative EXAMINATION:  CT HEAD WITHOUT CONTRAST    CLINICAL HISTORY:  Stroke;Inability to move LLE.;    TECHNIQUE:  Low dose axial images were obtained through the head.  Coronal and sagittal reformations were also performed. Contrast was not administered.    COMPARISON:  May 4, 2018.    FINDINGS:  Remote left ALEJANDRA infarct, unchanged.  There is no evidence of acute infarct, hemorrhage, or mass.  The ventricular system is normal in size.  No mass-effect or midline shift.  There are no abnormal extra-axial fluid collections.  The paranasal sinuses and mastoid air cells are clear.  The calvarium appears intact.  .      Impression No acute intracranial abnormalities.    No significant change compared to prior study.      Electronically signed by: Kelvin Partida MD  Date:    07/02/2019  Time:    23:25      Cardiac Studies: None    Significant Treatments/Procedures:   HD initiation    Consults while in Hospital:   Cardiology, Nephrology and Palliative Medicine    Discharge Medications:      Current Discharge Medication List      START taking these medications    Details   DOBUTamine (DOBUTREX) 500 mg/250 mL (2,000 mcg/mL) Inject 322.75 mcg/min into the vein continuous.  Qty: 250 mL      methocarbamol (ROBAXIN) 500 MG Tab Take 1 tablet (500 mg total) by mouth 3 (three) times daily as needed.      ondansetron (ZOFRAN) 4 MG tablet Take 1 tablet (4 mg total) by mouth every 8 (eight) hours as needed for Nausea.      oxyCODONE (ROXICODONE) 15 MG Tab Take 1 tablet (15 mg total) by mouth every 4 (four) hours as needed for Pain.  Refills: 0      senna-docusate 8.6-50 mg  (PERICOLACE) 8.6-50 mg per tablet Take 1 tablet by mouth 2 (two) times daily as needed for Constipation.         CONTINUE these medications which have NOT CHANGED    Details   aspirin (ECOTRIN) 81 MG EC tablet Take 81 mg by mouth once daily.      ferrous sulfate 325 (65 FE) MG EC tablet Take 1 tablet (325 mg total) by mouth once daily.  Qty: 30 tablet, Refills: 2      furosemide (LASIX) 80 MG tablet Take 1 tablet (80 mg total) by mouth 2 (two) times daily.  Qty: 60 tablet, Refills: 11      nitroGLYCERIN (NITROSTAT) 0.4 MG SL tablet Place 1 tablet (0.4 mg total) under the tongue every 5 (five) minutes as needed for Chest pain. Tablet, Sublingual Sublingual  Qty: 30 tablet, Refills: 11      warfarin (COUMADIN) 1 MG tablet Take 7 tablets (7 mg total) by mouth Daily.  Qty: 210 tablet, Refills: 2      albuterol (PROVENTIL/VENTOLIN HFA) 90 mcg/actuation inhaler Inhale 1-2 puffs into the lungs every 6 (six) hours as needed for Wheezing. Rescue  Qty: 1 Inhaler, Refills: 0      isosorbide-hydrALAZINE 20-37.5 mg (BIDIL) 20-37.5 mg Tab Take 1 tablet by mouth 2 (two) times daily.  Qty: 60 tablet, Refills: 11         STOP taking these medications       metoprolol tartrate (LOPRESSOR) 25 MG tablet Comments:   Reason for Stopping:              Discharge Diet:cardiac diet and fluid restriction: 1200 cc     Activity: activity as tolerated    Discharged Condition: Critical    Discharge Disposition: Home or Self Care    Follow-up:   F/u with Hospice Medical Director.    Studies/Results pending on discharge:   None    60 minutes.    Markus Howard MD  [facility]

## 2020-01-20 NOTE — PLAN OF CARE
01/20/20 0649   Final Note   Assessment Type Final Discharge Note   [facility]

## 2020-03-06 PROCEDURE — 99285 PR EMERGENCY DEPT VISIT,LEVEL V: ICD-10-PCS | Mod: ,,, | Performed by: EMERGENCY MEDICINE

## 2020-03-06 PROCEDURE — 36430 TRANSFUSION BLD/BLD COMPNT: CPT

## 2020-03-06 PROCEDURE — 99285 EMERGENCY DEPT VISIT HI MDM: CPT | Mod: 25

## 2020-03-06 PROCEDURE — 96374 THER/PROPH/DIAG INJ IV PUSH: CPT

## 2020-03-06 PROCEDURE — 99285 EMERGENCY DEPT VISIT HI MDM: CPT | Mod: ,,, | Performed by: EMERGENCY MEDICINE

## 2020-03-07 ENCOUNTER — HOSPITAL ENCOUNTER (INPATIENT)
Facility: HOSPITAL | Age: 55
LOS: 4 days | Discharge: HOSPICE | DRG: 291 | End: 2020-03-11
Attending: EMERGENCY MEDICINE | Admitting: HOSPITALIST
Payer: OTHER MISCELLANEOUS

## 2020-03-07 DIAGNOSIS — N17.9 ACUTE KIDNEY INJURY: ICD-10-CM

## 2020-03-07 DIAGNOSIS — R06.02 SHORTNESS OF BREATH: ICD-10-CM

## 2020-03-07 DIAGNOSIS — I50.43 ACUTE ON CHRONIC COMBINED SYSTOLIC AND DIASTOLIC HEART FAILURE: Primary | ICD-10-CM

## 2020-03-07 DIAGNOSIS — D50.0 IRON DEFICIENCY ANEMIA DUE TO CHRONIC BLOOD LOSS: ICD-10-CM

## 2020-03-07 DIAGNOSIS — R79.1 ELEVATED INR: ICD-10-CM

## 2020-03-07 DIAGNOSIS — R60.9 SWELLING: ICD-10-CM

## 2020-03-07 DIAGNOSIS — I50.9 HEART FAILURE: ICD-10-CM

## 2020-03-07 DIAGNOSIS — M79.89 SWELLING OF RIGHT UPPER EXTREMITY: ICD-10-CM

## 2020-03-07 LAB
ABO + RH BLD: NORMAL
ALBUMIN SERPL BCP-MCNC: 3 G/DL (ref 3.5–5.2)
ALP SERPL-CCNC: 211 U/L (ref 55–135)
ALT SERPL W/O P-5'-P-CCNC: 18 U/L (ref 10–44)
ANION GAP SERPL CALC-SCNC: 17 MMOL/L (ref 8–16)
ANISOCYTOSIS BLD QL SMEAR: SLIGHT
AST SERPL-CCNC: 43 U/L (ref 10–40)
BASOPHILS # BLD AUTO: 0.02 K/UL (ref 0–0.2)
BASOPHILS NFR BLD: 0.3 % (ref 0–1.9)
BILIRUB SERPL-MCNC: 1.2 MG/DL (ref 0.1–1)
BLD GP AB SCN CELLS X3 SERPL QL: NORMAL
BLD PROD TYP BPU: NORMAL
BLOOD UNIT EXPIRATION DATE: NORMAL
BLOOD UNIT TYPE CODE: 6200
BLOOD UNIT TYPE: NORMAL
BNP SERPL-MCNC: 304 PG/ML (ref 0–99)
BUN SERPL-MCNC: 134 MG/DL (ref 6–20)
CALCIUM SERPL-MCNC: 9.7 MG/DL (ref 8.7–10.5)
CHLORIDE SERPL-SCNC: 94 MMOL/L (ref 95–110)
CO2 SERPL-SCNC: 27 MMOL/L (ref 23–29)
CODING SYSTEM: NORMAL
CREAT SERPL-MCNC: 4.5 MG/DL (ref 0.5–1.4)
DIFFERENTIAL METHOD: ABNORMAL
DISPENSE STATUS: NORMAL
EOSINOPHIL # BLD AUTO: 0.1 K/UL (ref 0–0.5)
EOSINOPHIL NFR BLD: 1.5 % (ref 0–8)
ERYTHROCYTE [DISTWIDTH] IN BLOOD BY AUTOMATED COUNT: 17.2 % (ref 11.5–14.5)
EST. GFR  (AFRICAN AMERICAN): 16 ML/MIN/1.73 M^2
EST. GFR  (NON AFRICAN AMERICAN): 13.9 ML/MIN/1.73 M^2
GLUCOSE SERPL-MCNC: 146 MG/DL (ref 70–110)
HCT VFR BLD AUTO: 18.4 % (ref 40–54)
HGB BLD-MCNC: 5.2 G/DL (ref 14–18)
HYPOCHROMIA BLD QL SMEAR: ABNORMAL
IMM GRANULOCYTES # BLD AUTO: 0.05 K/UL (ref 0–0.04)
IMM GRANULOCYTES NFR BLD AUTO: 0.6 % (ref 0–0.5)
INR PPP: 3.3 (ref 0.8–1.2)
LYMPHOCYTES # BLD AUTO: 0.6 K/UL (ref 1–4.8)
LYMPHOCYTES NFR BLD: 7.2 % (ref 18–48)
MCH RBC QN AUTO: 23.6 PG (ref 27–31)
MCHC RBC AUTO-ENTMCNC: 28.3 G/DL (ref 32–36)
MCV RBC AUTO: 84 FL (ref 82–98)
MONOCYTES # BLD AUTO: 1.4 K/UL (ref 0.3–1)
MONOCYTES NFR BLD: 18.4 % (ref 4–15)
NEUTROPHILS # BLD AUTO: 5.6 K/UL (ref 1.8–7.7)
NEUTROPHILS NFR BLD: 72 % (ref 38–73)
NRBC BLD-RTO: 1 /100 WBC
NUM UNITS TRANS PACKED RBC: NORMAL
PLATELET # BLD AUTO: 313 K/UL (ref 150–350)
PLATELET BLD QL SMEAR: ABNORMAL
PMV BLD AUTO: 9.6 FL (ref 9.2–12.9)
POLYCHROMASIA BLD QL SMEAR: ABNORMAL
POTASSIUM SERPL-SCNC: 5 MMOL/L (ref 3.5–5.1)
PROT SERPL-MCNC: 8.3 G/DL (ref 6–8.4)
PROTHROMBIN TIME: 31.3 SEC (ref 9–12.5)
RBC # BLD AUTO: 2.2 M/UL (ref 4.6–6.2)
SODIUM SERPL-SCNC: 138 MMOL/L (ref 136–145)
TROPONIN I SERPL DL<=0.01 NG/ML-MCNC: 0.22 NG/ML (ref 0–0.03)
TROPONIN I SERPL DL<=0.01 NG/ML-MCNC: 0.24 NG/ML (ref 0–0.03)
WBC # BLD AUTO: 7.82 K/UL (ref 3.9–12.7)

## 2020-03-07 PROCEDURE — 86850 RBC ANTIBODY SCREEN: CPT

## 2020-03-07 PROCEDURE — 85025 COMPLETE CBC W/AUTO DIFF WBC: CPT

## 2020-03-07 PROCEDURE — 97802 MEDICAL NUTRITION INDIV IN: CPT

## 2020-03-07 PROCEDURE — 63600175 PHARM REV CODE 636 W HCPCS: Performed by: HOSPITALIST

## 2020-03-07 PROCEDURE — 25000003 PHARM REV CODE 250: Performed by: HOSPITALIST

## 2020-03-07 PROCEDURE — P9016 RBC LEUKOCYTES REDUCED: HCPCS

## 2020-03-07 PROCEDURE — 20600001 HC STEP DOWN PRIVATE ROOM

## 2020-03-07 PROCEDURE — 83880 ASSAY OF NATRIURETIC PEPTIDE: CPT

## 2020-03-07 PROCEDURE — 36415 COLL VENOUS BLD VENIPUNCTURE: CPT

## 2020-03-07 PROCEDURE — 63600175 PHARM REV CODE 636 W HCPCS: Performed by: STUDENT IN AN ORGANIZED HEALTH CARE EDUCATION/TRAINING PROGRAM

## 2020-03-07 PROCEDURE — 93005 ELECTROCARDIOGRAM TRACING: CPT

## 2020-03-07 PROCEDURE — 86920 COMPATIBILITY TEST SPIN: CPT

## 2020-03-07 PROCEDURE — 84484 ASSAY OF TROPONIN QUANT: CPT

## 2020-03-07 PROCEDURE — 93010 EKG 12-LEAD: ICD-10-PCS | Mod: ,,, | Performed by: INTERNAL MEDICINE

## 2020-03-07 PROCEDURE — 80053 COMPREHEN METABOLIC PANEL: CPT

## 2020-03-07 PROCEDURE — 99223 PR INITIAL HOSPITAL CARE,LEVL III: ICD-10-PCS | Mod: ,,, | Performed by: HOSPITALIST

## 2020-03-07 PROCEDURE — 84484 ASSAY OF TROPONIN QUANT: CPT | Mod: 91

## 2020-03-07 PROCEDURE — 99223 1ST HOSP IP/OBS HIGH 75: CPT | Mod: ,,, | Performed by: HOSPITALIST

## 2020-03-07 PROCEDURE — 85610 PROTHROMBIN TIME: CPT

## 2020-03-07 PROCEDURE — 93010 ELECTROCARDIOGRAM REPORT: CPT | Mod: 76,,, | Performed by: INTERNAL MEDICINE

## 2020-03-07 RX ORDER — HYDROCODONE BITARTRATE AND ACETAMINOPHEN 500; 5 MG/1; MG/1
TABLET ORAL
Status: DISCONTINUED | OUTPATIENT
Start: 2020-03-07 | End: 2020-03-11 | Stop reason: HOSPADM

## 2020-03-07 RX ORDER — OXYCODONE HYDROCHLORIDE 5 MG/1
15 TABLET ORAL EVERY 4 HOURS PRN
Status: DISCONTINUED | OUTPATIENT
Start: 2020-03-07 | End: 2020-03-11 | Stop reason: HOSPADM

## 2020-03-07 RX ORDER — POLYETHYLENE GLYCOL 3350 17 G/17G
17 POWDER, FOR SOLUTION ORAL DAILY
Status: DISCONTINUED | OUTPATIENT
Start: 2020-03-07 | End: 2020-03-11 | Stop reason: HOSPADM

## 2020-03-07 RX ORDER — FERROUS SULFATE 325(65) MG
325 TABLET, DELAYED RELEASE (ENTERIC COATED) ORAL DAILY
Status: DISCONTINUED | OUTPATIENT
Start: 2020-03-07 | End: 2020-03-11 | Stop reason: HOSPADM

## 2020-03-07 RX ORDER — ISOSORBIDE DINITRATE 20 MG/1
20 TABLET ORAL 2 TIMES DAILY
Status: DISCONTINUED | OUTPATIENT
Start: 2020-03-07 | End: 2020-03-11 | Stop reason: HOSPADM

## 2020-03-07 RX ORDER — FUROSEMIDE 10 MG/ML
120 INJECTION INTRAMUSCULAR; INTRAVENOUS ONCE
Status: COMPLETED | OUTPATIENT
Start: 2020-03-07 | End: 2020-03-07

## 2020-03-07 RX ORDER — MORPHINE SULFATE 15 MG/1
15 TABLET, FILM COATED, EXTENDED RELEASE ORAL 2 TIMES DAILY
Status: DISCONTINUED | OUTPATIENT
Start: 2020-03-07 | End: 2020-03-11 | Stop reason: HOSPADM

## 2020-03-07 RX ORDER — LORAZEPAM 2 MG/ML
2 CONCENTRATE ORAL EVERY 4 HOURS PRN
Status: DISCONTINUED | OUTPATIENT
Start: 2020-03-07 | End: 2020-03-11 | Stop reason: HOSPADM

## 2020-03-07 RX ORDER — DIGOXIN 125 MCG
125 TABLET ORAL DAILY
Status: DISCONTINUED | OUTPATIENT
Start: 2020-03-07 | End: 2020-03-11 | Stop reason: HOSPADM

## 2020-03-07 RX ORDER — METHOCARBAMOL 500 MG/1
500 TABLET, FILM COATED ORAL 3 TIMES DAILY PRN
Status: DISCONTINUED | OUTPATIENT
Start: 2020-03-07 | End: 2020-03-11 | Stop reason: HOSPADM

## 2020-03-07 RX ORDER — SODIUM CHLORIDE 0.9 % (FLUSH) 0.9 %
10 SYRINGE (ML) INJECTION
Status: DISCONTINUED | OUTPATIENT
Start: 2020-03-07 | End: 2020-03-11 | Stop reason: HOSPADM

## 2020-03-07 RX ORDER — ASPIRIN 81 MG/1
81 TABLET ORAL DAILY
Status: DISCONTINUED | OUTPATIENT
Start: 2020-03-07 | End: 2020-03-11 | Stop reason: HOSPADM

## 2020-03-07 RX ORDER — ONDANSETRON 4 MG/1
4 TABLET, FILM COATED ORAL EVERY 6 HOURS PRN
Status: DISCONTINUED | OUTPATIENT
Start: 2020-03-07 | End: 2020-03-11 | Stop reason: HOSPADM

## 2020-03-07 RX ORDER — METOLAZONE 2.5 MG/1
10 TABLET ORAL EVERY OTHER DAY
Status: DISCONTINUED | OUTPATIENT
Start: 2020-03-08 | End: 2020-03-10

## 2020-03-07 RX ORDER — AMOXICILLIN 250 MG
1 CAPSULE ORAL 2 TIMES DAILY PRN
Status: DISCONTINUED | OUTPATIENT
Start: 2020-03-07 | End: 2020-03-11 | Stop reason: HOSPADM

## 2020-03-07 RX ORDER — HYDRALAZINE HYDROCHLORIDE 25 MG/1
25 TABLET, FILM COATED ORAL 2 TIMES DAILY
Status: DISCONTINUED | OUTPATIENT
Start: 2020-03-07 | End: 2020-03-11 | Stop reason: HOSPADM

## 2020-03-07 RX ORDER — FUROSEMIDE 10 MG/ML
80 INJECTION INTRAMUSCULAR; INTRAVENOUS
Status: COMPLETED | OUTPATIENT
Start: 2020-03-07 | End: 2020-03-07

## 2020-03-07 RX ADMIN — FUROSEMIDE 80 MG: 10 INJECTION, SOLUTION INTRAMUSCULAR; INTRAVENOUS at 04:03

## 2020-03-07 RX ADMIN — HYDRALAZINE HYDROCHLORIDE 25 MG: 25 TABLET, FILM COATED ORAL at 01:03

## 2020-03-07 RX ADMIN — FUROSEMIDE 30 MG/HR: 10 INJECTION, SOLUTION INTRAMUSCULAR; INTRAVENOUS at 01:03

## 2020-03-07 RX ADMIN — FERROUS SULFATE TAB EC 325 MG (65 MG FE EQUIVALENT) 325 MG: 325 (65 FE) TABLET DELAYED RESPONSE at 01:03

## 2020-03-07 RX ADMIN — FUROSEMIDE 120 MG: 10 INJECTION, SOLUTION INTRAMUSCULAR; INTRAVENOUS at 01:03

## 2020-03-07 RX ADMIN — ISOSORBIDE DINITRATE 20 MG: 20 TABLET ORAL at 08:03

## 2020-03-07 RX ADMIN — HYDRALAZINE HYDROCHLORIDE 25 MG: 25 TABLET, FILM COATED ORAL at 08:03

## 2020-03-07 RX ADMIN — FUROSEMIDE 30 MG/HR: 10 INJECTION, SOLUTION INTRAMUSCULAR; INTRAVENOUS at 06:03

## 2020-03-07 RX ADMIN — ASPIRIN 81 MG: 81 TABLET, COATED ORAL at 01:03

## 2020-03-07 RX ADMIN — DIGOXIN 125 MCG: 125 TABLET ORAL at 01:03

## 2020-03-07 RX ADMIN — ISOSORBIDE DINITRATE 20 MG: 20 TABLET ORAL at 01:03

## 2020-03-07 RX ADMIN — MORPHINE SULFATE 15 MG: 15 TABLET, EXTENDED RELEASE ORAL at 08:03

## 2020-03-07 NOTE — ED PROVIDER NOTES
Encounter Date: 3/6/2020       History     Chief Complaint   Patient presents with    Hand swelling     reports right hand swelling and pain x1 month.  states swelling has worsened this last week.  right hand visibly swollen     Hamlet Terrell is a 54 y.o. male who  has a past medical history of Anticoagulant long-term use, Cardiomegaly, Chronic combined systolic and diastolic congestive heart failure, Coronary artery disease, Heart attack (2006), Hypertension, Hyperthyroidism, subclinical (1/2/2013), MI (myocardial infarction) (9/22/2013), Paroxysmal atrial fibrillation, PE (pulmonary embolism) (1/1/2013), S/P ablation of atrial flutter (2008), and Stroke (2009) presenting today with shortness of breath, fatigue, worsening edema, worsening swelling of his right upper extremity.  He states that he requires oxygen at home for his shortness of breath. He states that he is feeling weak. He is most concerned today about the swelling to his right upper extremity which he states has been progressing ever since he had a right upper extremity PICC line removed approximately a month ago.  There has not been any redness or warmth to the right upper extremity however it is grossly swollen.  History is limited overall by patient not wanting to cooperate and his complaint that he is short of breath        Review of patient's allergies indicates:   Allergen Reactions    Acetaminophen      Itching    Oxycodone-acetaminophen      Other reaction(s): Itching    Ace inhibitors Other (See Comments)     cough     Past Medical History:   Diagnosis Date    Anticoagulant long-term use     Cardiomegaly     Chronic combined systolic and diastolic congestive heart failure     Coronary artery disease     Heart attack 2006    Hypertension     Hyperthyroidism, subclinical 1/2/2013    MI (myocardial infarction) 9/22/2013    MI in 2009      Paroxysmal atrial fibrillation     PE (pulmonary embolism) 1/1/2013    IN 2010     S/P  ablation of atrial flutter 2008    Stroke 2009    no residual weaknesses     Past Surgical History:   Procedure Laterality Date    COLONOSCOPY N/A 12/2/2019    Procedure: COLONOSCOPY;  Surgeon: Scott Rosario MD;  [facility]  Service: Endoscopy;  Laterality: N/A;    ESOPHAGOGASTRODUODENOSCOPY N/A 12/2/2019    Procedure: EGD (ESOPHAGOGASTRODUODENOSCOPY);  Surgeon: Scott Rosario MD;  Location: [facility]  Service: Endoscopy;  Laterality: N/A;    RADIOFREQUENCY ABLATION  01/08/2008    for atrial flutter     Family History   Problem Relation Age of Onset    Hypertension Mother     Stroke Mother     Hypertension Father     Alcohol abuse Father     Hypertension Sister     Hypertension Brother      Social History     Tobacco Use    Smoking status: Never Smoker    Smokeless tobacco: Never Used   Substance Use Topics    Alcohol use: No    Drug use: No     Review of Systems   Constitutional: Positive for activity change and appetite change. Negative for fever.   Respiratory: Positive for cough, chest tightness and shortness of breath.    Cardiovascular: Positive for chest pain and leg swelling.       Physical Exam     Initial Vitals [03/06/20 2323]   BP Pulse Resp Temp SpO2   136/65 88 20 97.6 °F (36.4 °C) 100 %      MAP       --         Physical Exam    Nursing note and vitals reviewed.  Constitutional: He is not diaphoretic.   Obese AAM appears older than age   Eyes: EOM are normal. No scleral icterus.   Neck: Normal range of motion. JVD (noted to level of the angle of the mandible) present.   Cardiovascular: Normal rate. An irregularly irregular rhythm present.  Exam reveals distant heart sounds.    No murmur heard.  Pulmonary/Chest: He has decreased breath sounds in the right lower field and the left lower field. He has no rhonchi. He has no rales. He exhibits no tenderness.   Abdominal: Soft. He exhibits no distension. There is no tenderness.   Musculoskeletal: He  exhibits edema (gross edema to bilateral legs up through thighs ).   Grossly edematous right arm   Neurological: He is alert and oriented to person, place, and time. GCS score is 15. GCS eye subscore is 4. GCS verbal subscore is 5. GCS motor subscore is 6.         ED Course   Procedures  Labs Reviewed   CBC W/ AUTO DIFFERENTIAL - Abnormal; Notable for the following components:       Result Value    RBC 2.20 (*)     Hemoglobin 5.2 (*)     Hematocrit 18.4 (*)     Mean Corpuscular Hemoglobin 23.6 (*)     Mean Corpuscular Hemoglobin Conc 28.3 (*)     RDW 17.2 (*)     Immature Granulocytes 0.6 (*)     Immature Grans (Abs) 0.05 (*)     Lymph # 0.6 (*)     Mono # 1.4 (*)     nRBC 1 (*)     Lymph% 7.2 (*)     Mono% 18.4 (*)     All other components within normal limits    Narrative:       hct and hgb critical result(s) called and verbal readback obtained   from MADHURI RILEY RN by [facility] 03/07/2020 03:16   COMPREHENSIVE METABOLIC PANEL - Abnormal; Notable for the following components:    Chloride 94 (*)     Glucose 146 (*)     BUN, Bld 134 (*)     Creatinine 4.5 (*)     Albumin 3.0 (*)     Total Bilirubin 1.2 (*)     Alkaline Phosphatase 211 (*)     AST 43 (*)     Anion Gap 17 (*)     eGFR if  16.0 (*)     eGFR if non  13.9 (*)     All other components within normal limits   TROPONIN I - Abnormal; Notable for the following components:    Troponin I 0.237 (*)     All other components within normal limits   B-TYPE NATRIURETIC PEPTIDE - Abnormal; Notable for the following components:     (*)     All other components within normal limits   PROTIME-INR - Abnormal; Notable for the following components:    Prothrombin Time 31.3 (*)     INR 3.3 (*)     All other components within normal limits   TYPE & SCREEN   PREPARE RBC SOFT        ECG Results          EKG 12-lead (In process)  Result time 03/07/20 07:32:47    In process by Interface, Lab In [facility] (03/07/20 07:32:47)                  Narrative:    Test Reason : R06.02,    Vent. Rate : 088 BPM     Atrial Rate : 079 BPM     P-R Int : 106 ms          QRS Dur : 078 ms      QT Int : 326 ms       P-R-T Axes : 000 -77 170 degrees     QTc Int : 394 ms    Sinus rhythm with short RI with Premature supraventricular complexes and   with frequent and consecutive Premature ventricular complexes with  junctional escape complexes  Left axis deviation  Low voltage QRS  Lateral infarct , new  Inferior-posterior infarct , possibly acute    ACUTE MI / STEMI    Consider right ventricular involvement in acute inferior infarct  Abnormal ECG  When compared with ECG of 28-DEC-2019 21:02,  Sinus rhythm has replaced Atrial fibrillation  QRS duration has decreased  Acute Lateral infarct is now Present  Inferior-posterior infarct is now Present    Referred By: [email or web address]   SELF           Confirmed By:                             Imaging Results          CT Chest Without Contrast (No Result on File)                X-Ray Chest AP Portable (Final result)  Result time 03/07/20 01:44:58    Final result by Munir Martínez MD (03/07/20 01:44:58)                 Impression:      Stable abnormal chest radiograph.      Electronically signed by: Munir Martínez MD  Date:    03/07/2020  Time:    01:44             Narrative:    EXAMINATION:  XR CHEST AP PORTABLE    CLINICAL HISTORY:  CHF;    TECHNIQUE:  Single frontal view of the chest was performed.    COMPARISON:  01/17/2020, 01/11/2020    FINDINGS:  Right-sided PICC line has been intervally removed.  Cardiomediastinal silhouette is enlarged.  There is elevation left hemidiaphragm.  There is a stable round opacity projecting over the right midlung zone.  There are increased interstitial lung markings and patchy bibasilar opacities, similar to prior examination.  There is probable small volume right-sided pleural fluid again noted no evidence of pneumothorax.  Osseous structures appear intact.                               X-Ray  Wrist Complete Right (Final result)  Result time 03/07/20 01:15:20    Final result by Munir Martínez MD (03/07/20 01:15:20)                 Impression:      No radiographic evidence of acute fracture of the right wrist.  Nonspecific soft tissue edema.      Electronically signed by: Munir Martínez MD  Date:    03/07/2020  Time:    01:15             Narrative:    EXAMINATION:  XR WRIST COMPLETE 3 VIEWS RIGHT    CLINICAL HISTORY:  Edema, unspecified    TECHNIQUE:  PA, lateral, and oblique views of the right wrist were performed.    COMPARISON:  None    FINDINGS:  There is no evidence of acute fracture or dislocation.  No evidence of aggressive cortical destruction or periosteal reaction.  There is nonspecific soft tissue edema, most pronounced along the dorsum of the hand.                               X-Ray Hand 3 View Right (Final result)  Result time 03/07/20 01:16:23    Final result by Munir Martínez MD (03/07/20 01:16:23)                 Impression:      No radiographic evidence of acute fracture or dislocation of the right hand.  Nonspecific dorsal soft tissue edema.      Electronically signed by: Munir Martínez MD  Date:    03/07/2020  Time:    01:16             Narrative:    EXAMINATION:  XR HAND COMPLETE 3 VIEW RIGHT    CLINICAL HISTORY:  hand swelling;    TECHNIQUE:  PA, lateral, and oblique views of the right hand were performed.    COMPARISON:  Right hand radiograph series 02/28/2020    FINDINGS:  There is no evidence of acute fracture or dislocation.  Joint spaces appear adequately maintained.  There is nonspecific soft tissue edema, most pronounced along the dorsum of the hand at the level the metacarpals, similar to prior study.  No radiopaque foreign body appreciated.                                 Medical Decision Making:   History:   Old Medical Records: I decided to obtain old medical records.  Old Records Summarized: records from previous admission(s).       <> Summary of Records:  Decompensated heart failure was told that he may need an LVAD due to his level of heart failure.  Initial Assessment:   Hamlet Terrell is a 54 y.o. male with history of combined heart failure with decreased ejection fraction here today with bilateral lower extremity edema, shortness of breath, right upper extremity swelling who appears to be having an acute on chronic heart failure exacerbation  Differential Diagnosis:   CHF exacerbation, lower suspicion at this time for an acute coronary syndrome as the patient is not having acute chest pain at the time of presentation but with his shortness of breath and dark stools may be having demand ischemia,  Clinical Tests:   Lab Tests: Ordered and Reviewed       <> Summary of Lab: Hgb of <6  Elevated troponin  Elevated BNP  Radiological Study: Ordered and Reviewed  ED Management:  8 mg of furosemide, 1 unit blood transfusion ordered.  Discussed with the cardiology fellow on-call who recommended blood to address possible demand ischemia, diuresis and admission for trending of troponin, serial EKGs, management acute on chronic CHF exacerbation.  The patient has a stable abnormal chest x-ray with right patchy infiltrate with a right middle lobe abnormality.  The patient refused to have a chest CT due to the fact that he is unable to lay flat.  Patient has JEAN-PIERRE is, diuresing at this time due to his fluid overload.       APC / Resident Notes:   James Sigala MD  8:12 AM 3/7/2020  PGY-5 [employer] Emergency Medicine/Pediatrics          Attending Attestation:   Physician Attestation Statement for Resident:  As the supervising MD   Physician Attestation Statement: I have personally seen and examined this patient.   I agree with the above history. -:   As the supervising MD I agree with the above PE.    As the supervising MD I agree with the above treatment, course, plan, and disposition.   -:   54-year-old male with severe systolic heart failure presenting to ER with complaint of  worsening dyspnea and right upper extremity swelling after a PICC was recently removed from that extremity.    His INR is supratherapeutic, however we are suspicious for DVT due to the degree of swelling.    BNP is elevated beyond previous, troponin elevated beyond previous, suspect due to heart failure and not ACS.  Will trend troponins.    Patient to be admitted to Medicine.  Pending ultrasound the time of sign-out.  Plan diuresis, treatment of CHF exacerbation.      I have reviewed and agree with the residents interpretation of the following: lab data.                    ED Course as of Mar 07 0811   Sat Mar 07, 2020   0226 Progressive right arm swelling following removal of a PICC line approximately 1 month ago.  Chronic shortness of breath with oxygen requirement at home    [NS]   0320 Worsening anemia. Will type and screen    Hemoglobin(!!): 5.2 [NS]   0320 Elevated INR   INR(!): 3.3 [NS]   0752 BNP(!): 304 [NS]   0752 Elevated compared to previous   Troponin I(!): 0.237 [NS]   0753 Patient reporting that he has been having dark stools    [NS]   0753 Acute kidney injury   eGFR if (!): 16.0 [NS]      ED Course User Index  [NS] James Sigala MD                Clinical Impression:       ICD-10-CM ICD-9-CM   1. Acute on chronic combined systolic and diastolic heart failure I50.43 428.43   2. Swelling R60.9 782.3   3. Shortness of breath R06.02 786.05   4. Iron deficiency anemia due to chronic blood loss D50.0 280.0   5. Elevated INR R79.1 790.92   6. Swelling of right upper extremity M79.89 729.81   7. Acute kidney injury N17.9 584.9   8. Heart failure I50.9 428.9             ED Disposition Condition    Admit                           James Sigala MD  Resident  03/07/20 [id number]       Moni Nava MD  03/07/20 [id number]

## 2020-03-07 NOTE — H&P
[facility] Medicine  History & Physical    Admitting Team: IM-C  Attending Physician: Antoinette Goins MD  Date of Admit: 3/7/2020    Chief Complaint     Hand swelling (reports right hand swelling and pain x1 month.  states swelling has worsened this last week.  right hand visibly swollen)      Subjective:      History of Present Illness:  Hamlet Terrell is a 54 y.o. male with chronic combined systolic and diastolic heart failure (EF 25%), afib on coumadin, CAD, CVA, and history of PE, who [place] [facility] [place] to home hospice on  gtt. Since discharge patient requested the  gtt be discontinued so his PICC was subsequently removed. Pt states that since PICC was removed, his RUE from hand to the elbow developed progressive swelling and pain to touch. Denies erythema, abrasions, or drainage. He reports some shortness of breath and weight gain of about 20 lbs since discharge, but that these symptoms are not bothering him. The reason for ED visit was for the arm swelling only. States he would like to continue home hospice on discharge. He is happy with the care he is receiving with home hospice.     In ED pt found to have Hb 5.2. , bilateral interstitial markings on CXR, and trop of 0.2. ECG w/ AF with PVC's. He is receiving 1 unit prbc. Also received 80 mg Lasix IV in ED with minimal output.     Past Medical History:  Past Medical History:   Diagnosis Date    Anticoagulant long-term use     Cardiomegaly     Chronic combined systolic and diastolic congestive heart failure     Coronary artery disease     Heart attack 2006    Hypertension     Hyperthyroidism, subclinical 1/2/2013    MI (myocardial infarction) 9/22/2013    MI in 2009      Paroxysmal atrial fibrillation     PE (pulmonary embolism) 1/1/2013    IN 2010     S/P ablation of atrial flutter 2008    Stroke 2009    no residual weaknesses       Past Surgical History:  Past Surgical History:   Procedure  Laterality Date    COLONOSCOPY N/A 12/2/2019    Procedure: COLONOSCOPY;  Surgeon: Scott Rosario MD;  Location: [facility] (2ND FLR);  Service: Endoscopy;  Laterality: N/A;    ESOPHAGOGASTRODUODENOSCOPY N/A 12/2/2019    Procedure: EGD (ESOPHAGOGASTRODUODENOSCOPY);  Surgeon: Scott Rosario MD;  Location: [facility] ([facility] F[facility]);  Service: Endoscopy;  Laterality: N/A;    RADIOFREQUENCY ABLATION  01/08/2008    for atrial flutter       Allergies:  Review of patient's allergies indicates:   Allergen Reactions    Acetaminophen      Itching    Oxycodone-acetaminophen      Other reaction(s): Itching    Ace inhibitors Other (See Comments)     cough       Home Medications:  Prior to Admission medications    Medication Sig Start Date End Date Taking? Authorizing Provider   albuterol (PROVENTIL/VENTOLIN HFA) 90 mcg/actuation inhaler Inhale 1-2 puffs into the lungs every 6 (six) hours as needed for Wheezing. Rescue 1/31/19 1/31/20  Monika Starr,    albuterol-ipratropium (DUO-NEB) 2.5 mg-0.5 mg/3 mL nebulizer solution Take 3 mLs by nebulization every 6 (six) hours as needed for Wheezing. Rescue    Historical Provider, MD   aspirin (ECOTRIN) 81 MG EC tablet Take 81 mg by mouth once daily.    Historical Provider, MD   BISACODYL RECT Place 10 mg rectally daily as needed.    Historical Provider, MD   digoxin (LANOXIN) 125 mcg tablet Take 125 mcg by mouth once daily.    Historical Provider, MD   DOBUTamine (DOBUTREX) 500 mg/250 mL (2,000 mcg/mL) Inject 322.75 mcg/min into the vein continuous. 1/17/20   Markus Howard MD   ferrous sulfate 325 (65 FE) MG EC tablet Take 1 tablet (325 mg total) by mouth once daily. 12/8/19 3/7/20  Markus Howard MD   furosemide (LASIX) 80 MG tablet Take 1 tablet (80 mg total) by mouth 2 (two) times daily. 7/17/19 7/16/20  So Tyler MD   hydrALAZINE (APRESOLINE) 25 MG tablet Take 25 mg by mouth 2 (two) times daily.    Historical Provider, MD   isosorbide dinitrate (ISORDIL) 20 MG  tablet Take 20 mg by mouth 2 (two) times daily.    Historical Provider, MD   isosorbide-hydrALAZINE 20-37.5 mg (BIDIL) 20-37.5 mg Tab Take 1 tablet by mouth 2 (two) times daily.  Patient not taking: Reported on 1/6/2020 7/17/19 7/16/20  So Tyler MD   lorazepam 2 mg/ml oral conc (ATIVAN) 2 mg/mL Conc Take 2 mg by mouth every 4 (four) hours as needed.    Historical Provider, MD   methocarbamol (ROBAXIN) 500 MG Tab Take 500 mg by mouth 3 (three) times daily as needed.    Historical Provider, MD   metOLazone (ZAROXOLYN) 10 MG tablet Take 10 mg by mouth every other day.    Historical Provider, MD   morphine (MS CONTIN) 15 MG 12 hr tablet Take 15 mg by mouth 2 (two) times daily.    Historical Provider, MD   morphine 100 mg/5 mL (20 mg/mL) concentrated solution Take 10 mg by mouth every 2 (two) hours as needed for Pain.    Historical Provider, MD   nitroGLYCERIN (NITROSTAT) 0.4 MG SL tablet Place 1 tablet (0.4 mg total) under the tongue every 5 (five) minutes as needed for Chest pain. Tablet, Sublingual Sublingual 11/1/17   KAREEM Ulloa MD   ondansetron (ZOFRAN) 4 MG tablet Take 1 tablet (4 mg total) by mouth every 8 (eight) hours as needed for Nausea. 1/17/20   Markus Howard MD   oxyCODONE (ROXICODONE) 15 MG Tab Take 1 tablet (15 mg total) by mouth every 4 (four) hours as needed for Pain. 1/17/20   Markus Howard MD   polyethylene glycol (GLYCOLAX) 17 gram PwPk Take by mouth once daily.    Historical Provider, MD   potassium chloride (KLOR-CON SPRINKLE) 10 MEQ CpSR Take 10 mEq by mouth once daily.    Historical Provider, MD   senna-docusate 8.6-50 mg (PERICOLACE) 8.6-50 mg per tablet Take 1 tablet by mouth 2 (two) times daily as needed for Constipation. 1/17/20   Markus Howard MD   warfarin (COUMADIN) 1 MG tablet Take 7 tablets (7 mg total) by mouth Daily.  Patient taking differently: Take 7 mg by mouth every evening.  12/8/19 3/7/20  Markus Howard MD       Family History:  Family History  "  Problem Relation Age of Onset    Hypertension Mother     Stroke Mother     Hypertension Father     Alcohol abuse Father     Hypertension Sister     Hypertension Brother        Social History:  Social History     Tobacco Use    Smoking status: Never Smoker    Smokeless tobacco: Never Used   Substance Use Topics    Alcohol use: No    Drug use: No       Review of Systems:  As per HPI  General: no fever, no chills, no weight loss, no fatigue  Nose, Sinuses: no rhinorrhea, no facial pain, no epistaxis  Cardiovascular: no chest pain, no orthopnea  Respiratory: no cough, no wheezes, no chest pain  Urinary: no dysuria, no increased frequency, no hematuria  Hematologic: no easy bruising, no overt bleeding, no petechiae  Endocrine: no heat intolerance, no cold intolerance, no polyuria  Neurologic: no seizures, no tremors, no numbness  Psychiatric: no hallucination, no depression, no suicidal ideation  Skin: no rashes, no pruritus, no pallor  All other systems reviewed & are negative.        Objective:   Last 24 Hour Vital Signs:  BP  Min: 99/51  Max: 136/65  Temp  Av.6 °F (36.4 °C)  Min: 97.5 °F (36.4 °C)  Max: 97.8 °F (36.6 °C)  Pulse  Av.9  Min: 82  Max: 91  Resp  Av  Min: 17  Max: 20  SpO2  Av.7 %  Min: 96 %  Max: 100 %  Height  Av' 9" (175.3 cm)  Min: 5' 9" (175.3 cm)  Max: 5' 9" (175.3 cm)  Weight  Av.1 kg (256 lb)  Min: 116.1 kg (256 lb)  Max: 116.1 kg (256 lb)  Body mass index is 37.8 kg/m².  No intake/output data recorded.    Physical Examination:  GEN: AAOx3, NAD  HEENT: NCAT, MMM, PERRL, EOMI, oropharynx clear  CV: RRR, no m/r, unable to assess jvp due to body habitus  RESP: CTAB, no wheezes/crackles  ABD: soft, NTND, normoactive BS, no organomegaly  EXTR: no c/c, 2+ distal pulses x 4, b/l pitting edema BLE, RUE swollen from hand to elbow and TTP, no erythema/induration/fluctuance noted  NEURO: PERRL, EOMI, 5/5 motor strength all four extremities, intact sensation to light " touch, no focal deficits  SKIN: no rashes, lesions, or color changes  PSYCH: normal affect     Laboratory:  I have reviewed all pertinent laboratory data within the past 24 hours.    Other Results:  EKG (my interpretation): AF with PVCs    Radiology:  I have reviewed all pertinent radiology imaging within the past 24 hours.    Prior records reviewed.     Assessment/Plan:     Hamlet Terrell is a 54 y.o. male with:    Acute on Chronic Combined Systolic and Diastolic Heart Failure  Cardiomyopathy  Patient with advanced heart failure and refuses discussion for advanced options with HTS. Patient without proper response to diuretics and has proven to be  dependent. Patient with progressive renal dysfunction yet refuses outpatient HD, making any attempts at inpatient HD temporizing given the severity of his cardiac dysfunction.   · . Trop 0.2, likely demand ischemia, trend. CXR with pulmonary edema. Dry weight in 240's. Pt reports weight of 260's at home.  ·  gtt discontinued by patient after discharge to home hospice  · Strict I&Os with daily weights  · Cont home Bidil  · No ACEI/ARB due to renal function  · Cont home qother day metolazone  · Minimal output with IV Lasix 80 mg in ED. Give 120 mg bolus + Lasix 30 mg/hr gtt     RUE swelling  - US ordered  - Elevate    JEAN-PIERRE on CKD3  · Cr 2.7 on discharge in January. 4.5 on presentation  · Cardiorenal   · Received temporary HD during last admission. Per last discharge, no indication at present time for HD as treatments would be only temporizing and goal is to maximize patient's time at home/quality of life  · Monitor w/ diuresis     Iron Def Anemia  - Hb 5.2 on presentation. Baseline 6.5. ED ordered 1 unit prbc  - Denies bleeding. Likely dilutional in setting of fluid overload  - Monitor  - Cont Fe    Chronic AFib  Long Term Use of Anticoagulants  Supratherapeutic INR  · Chronic issue  · Rate controlled  · INR 3.3. Hold coumadin today. Daily INR's     Essential  HTN  · Chronic and stable  · Continue Bidil BID     CAD  History of MI  · Chronic and stable  · Continue Aspirin 81mg PO daily  · Continue Bidil BID    History of VTE  History of Stroke  · Chronic and stable  · Continue Aspirin 81mg PO daily  · Continue Warfarin when INR appropriate      Diet- cardiac, 1.2 L  PPX- INR 3.3  Dispo- pending u/s & diuresis; will return to home hospice        Antoinette Goins MD  [facility] Medicine Staff  [facility]

## 2020-03-07 NOTE — ED NOTES
Pt is currently sleeping, sitting at 90 degrees in stretcher. NAD noted, VSS. BP cuff and pulse ox alarms are set. Bed low and locked, side rails up x2. Call light within reach of pt. Will continue to monitor.

## 2020-03-07 NOTE — PROGRESS NOTES
Patient admitted to CSU. Patient arrived to floor from ED no evidence of distress; patient AAO x4 at this time. Patient placed on tele. Vital signs obtained. 1 U PRBC infusing at 125cc/hr. Patient voices no complaints at this time. Plan of care initiated with patient. Bed in lowest position, locked, SR up x2, call bell in reach. Will continue to monitor patient.

## 2020-03-07 NOTE — ED NOTES
LOC: The patient is awake, alert, and oriented to place, time, situation. Affect is appropriate.  Speech is appropriate and clear.     APPEARANCE: Patient resting comfortably in no acute distress.  Patient is dirty and not well groomed.    SKIN: The skin is warm and dry; color consistent with ethnicity.  Patient has normal skin turgor and moist mucus membranes.  Bilateral scaly skin noted to lower extremities.     MUSCULOSKELETAL: Patient moving  lower extremities with difficulty, right hand swelling noted.  c/o weakness.     RESPIRATORY: Airway is open and patent. Respirations spontaneous, even, easy, and non-labored.  Patient has a normal effort and rate.  No accessory muscle use noted. Denies cough. Pt on 1L O2.    CARDIAC:  Afib with a controlled rate of 84. Bilateral leg swelling present, with JVD. Denies CP      ABDOMEN: Soft and non tender to palpation.  No distention noted.     NEUROLOGIC: Eyes open spontaneously.  Behavior appropriate to situation.  Follows commands; facial expression symmetrical.  Purposeful motor response noted; normal sensation in all extremities.

## 2020-03-07 NOTE — CONSULTS
"Food & Nutrition  Education    Diet Education: Cardiac  Time Spent: 5 minutes  Learners: Pt    Nutrition Education provided with handouts: Low Sodium Nutrition Therapy    Comments: Pt sitting up in bed reports feeling ok. Pt reports good PO intake of meals at home, follows a low Na diet. He states wt fluctuates between 260-265# reflecting some wt loss, likely related to fluid. NFPE not indicated at this time. Pt does not meet malnutrition criteria.     Attempted to educate pt on low Na diet, but pt refused. Pt states "I have heard this for many months and continue to follow a diet." Provided handouts to bedside.     Dietitian's contact information provided.     Follow-Up: 3/13/2020    Please Re-consult as needed    Thanks!      "

## 2020-03-07 NOTE — ED NOTES
"Pt refusing to sit in ED stretcher, and Is demanding we give him O2. Pt given recliner and placed on 1l of O2, states "I wear it at home."   "

## 2020-03-08 LAB
ANION GAP SERPL CALC-SCNC: 18 MMOL/L (ref 8–16)
BASOPHILS # BLD AUTO: 0.03 K/UL (ref 0–0.2)
BASOPHILS NFR BLD: 0.5 % (ref 0–1.9)
BLD PROD TYP BPU: NORMAL
BLOOD UNIT EXPIRATION DATE: NORMAL
BLOOD UNIT TYPE CODE: 8400
BLOOD UNIT TYPE: NORMAL
BUN SERPL-MCNC: 126 MG/DL (ref 6–20)
CALCIUM SERPL-MCNC: 9.8 MG/DL (ref 8.7–10.5)
CHLORIDE SERPL-SCNC: 94 MMOL/L (ref 95–110)
CO2 SERPL-SCNC: 27 MMOL/L (ref 23–29)
CODING SYSTEM: NORMAL
CREAT SERPL-MCNC: 3.6 MG/DL (ref 0.5–1.4)
DIFFERENTIAL METHOD: ABNORMAL
DISPENSE STATUS: NORMAL
EOSINOPHIL # BLD AUTO: 0.2 K/UL (ref 0–0.5)
EOSINOPHIL NFR BLD: 3.4 % (ref 0–8)
ERYTHROCYTE [DISTWIDTH] IN BLOOD BY AUTOMATED COUNT: 17 % (ref 11.5–14.5)
EST. GFR  (AFRICAN AMERICAN): 21 ML/MIN/1.73 M^2
EST. GFR  (NON AFRICAN AMERICAN): 18.2 ML/MIN/1.73 M^2
GLUCOSE SERPL-MCNC: 134 MG/DL (ref 70–110)
HCT VFR BLD AUTO: 18.4 % (ref 40–54)
HGB BLD-MCNC: 5.4 G/DL (ref 14–18)
IMM GRANULOCYTES # BLD AUTO: 0.06 K/UL (ref 0–0.04)
IMM GRANULOCYTES NFR BLD AUTO: 0.9 % (ref 0–0.5)
INR PPP: 2.7 (ref 0.8–1.2)
LYMPHOCYTES # BLD AUTO: 0.6 K/UL (ref 1–4.8)
LYMPHOCYTES NFR BLD: 8.9 % (ref 18–48)
MAGNESIUM SERPL-MCNC: 2.6 MG/DL (ref 1.6–2.6)
MCH RBC QN AUTO: 24.5 PG (ref 27–31)
MCHC RBC AUTO-ENTMCNC: 29.3 G/DL (ref 32–36)
MCV RBC AUTO: 84 FL (ref 82–98)
MONOCYTES # BLD AUTO: 1.1 K/UL (ref 0.3–1)
MONOCYTES NFR BLD: 16.5 % (ref 4–15)
NEUTROPHILS # BLD AUTO: 4.6 K/UL (ref 1.8–7.7)
NEUTROPHILS NFR BLD: 69.8 % (ref 38–73)
NRBC BLD-RTO: 1 /100 WBC
NUM UNITS TRANS PACKED RBC: NORMAL
PLATELET # BLD AUTO: 307 K/UL (ref 150–350)
PMV BLD AUTO: 9.8 FL (ref 9.2–12.9)
POTASSIUM SERPL-SCNC: 4.4 MMOL/L (ref 3.5–5.1)
PROTHROMBIN TIME: 25.6 SEC (ref 9–12.5)
RBC # BLD AUTO: 2.2 M/UL (ref 4.6–6.2)
SODIUM SERPL-SCNC: 139 MMOL/L (ref 136–145)
WBC # BLD AUTO: 6.54 K/UL (ref 3.9–12.7)

## 2020-03-08 PROCEDURE — 63600175 PHARM REV CODE 636 W HCPCS: Performed by: HOSPITALIST

## 2020-03-08 PROCEDURE — 99233 PR SUBSEQUENT HOSPITAL CARE,LEVL III: ICD-10-PCS | Mod: ,,, | Performed by: HOSPITALIST

## 2020-03-08 PROCEDURE — 80048 BASIC METABOLIC PNL TOTAL CA: CPT

## 2020-03-08 PROCEDURE — 25000003 PHARM REV CODE 250: Performed by: HOSPITALIST

## 2020-03-08 PROCEDURE — 85610 PROTHROMBIN TIME: CPT

## 2020-03-08 PROCEDURE — 99233 SBSQ HOSP IP/OBS HIGH 50: CPT | Mod: ,,, | Performed by: HOSPITALIST

## 2020-03-08 PROCEDURE — 83735 ASSAY OF MAGNESIUM: CPT

## 2020-03-08 PROCEDURE — 36415 COLL VENOUS BLD VENIPUNCTURE: CPT

## 2020-03-08 PROCEDURE — P9040 RBC LEUKOREDUCED IRRADIATED: HCPCS

## 2020-03-08 PROCEDURE — 20600001 HC STEP DOWN PRIVATE ROOM

## 2020-03-08 PROCEDURE — 85025 COMPLETE CBC W/AUTO DIFF WBC: CPT

## 2020-03-08 RX ORDER — SODIUM CHLORIDE 0.9 % (FLUSH) 0.9 %
10 SYRINGE (ML) INJECTION
Status: DISCONTINUED | OUTPATIENT
Start: 2020-03-08 | End: 2020-03-11 | Stop reason: HOSPADM

## 2020-03-08 RX ORDER — HYDROCODONE BITARTRATE AND ACETAMINOPHEN 500; 5 MG/1; MG/1
TABLET ORAL
Status: DISCONTINUED | OUTPATIENT
Start: 2020-03-08 | End: 2020-03-11 | Stop reason: HOSPADM

## 2020-03-08 RX ORDER — WARFARIN 3 MG/1
6 TABLET ORAL DAILY
Status: DISCONTINUED | OUTPATIENT
Start: 2020-03-08 | End: 2020-03-10

## 2020-03-08 RX ORDER — FUROSEMIDE 10 MG/ML
80 INJECTION INTRAMUSCULAR; INTRAVENOUS ONCE
Status: COMPLETED | OUTPATIENT
Start: 2020-03-08 | End: 2020-03-08

## 2020-03-08 RX ADMIN — MORPHINE SULFATE 15 MG: 15 TABLET, EXTENDED RELEASE ORAL at 09:03

## 2020-03-08 RX ADMIN — DIGOXIN 125 MCG: 125 TABLET ORAL at 10:03

## 2020-03-08 RX ADMIN — FUROSEMIDE 30 MG/HR: 10 INJECTION, SOLUTION INTRAMUSCULAR; INTRAVENOUS at 10:03

## 2020-03-08 RX ADMIN — ISOSORBIDE DINITRATE 20 MG: 20 TABLET ORAL at 10:03

## 2020-03-08 RX ADMIN — ISOSORBIDE DINITRATE 20 MG: 20 TABLET ORAL at 09:03

## 2020-03-08 RX ADMIN — WARFARIN SODIUM 6 MG: 3 TABLET ORAL at 05:03

## 2020-03-08 RX ADMIN — OXYCODONE HYDROCHLORIDE 15 MG: 5 TABLET ORAL at 05:03

## 2020-03-08 RX ADMIN — METOLAZONE 10 MG: 2.5 TABLET ORAL at 10:03

## 2020-03-08 RX ADMIN — FERROUS SULFATE TAB EC 325 MG (65 MG FE EQUIVALENT) 325 MG: 325 (65 FE) TABLET DELAYED RESPONSE at 10:03

## 2020-03-08 RX ADMIN — MORPHINE SULFATE 15 MG: 15 TABLET, EXTENDED RELEASE ORAL at 10:03

## 2020-03-08 RX ADMIN — FUROSEMIDE 30 MG/HR: 10 INJECTION, SOLUTION INTRAMUSCULAR; INTRAVENOUS at 01:03

## 2020-03-08 RX ADMIN — FUROSEMIDE 80 MG: 10 INJECTION, SOLUTION INTRAMUSCULAR; INTRAVENOUS at 09:03

## 2020-03-08 RX ADMIN — HYDRALAZINE HYDROCHLORIDE 25 MG: 25 TABLET, FILM COATED ORAL at 10:03

## 2020-03-08 RX ADMIN — HYDRALAZINE HYDROCHLORIDE 25 MG: 25 TABLET, FILM COATED ORAL at 09:03

## 2020-03-08 RX ADMIN — FUROSEMIDE 30 MG/HR: 10 INJECTION, SOLUTION INTRAMUSCULAR; INTRAVENOUS at 11:03

## 2020-03-08 RX ADMIN — ASPIRIN 81 MG: 81 TABLET, COATED ORAL at 10:03

## 2020-03-08 NOTE — PROGRESS NOTES
Patient with Hgb 5.4 despite one unit PRBC given yesterday. No evidence of bleeding and remained hemodynamically stable. Will order 1 unit PRBC transfusion.     Philip Medrano DO  Staff Physician, Hospital Medicine

## 2020-03-08 NOTE — PLAN OF CARE
Pt remains free of fall and injury. Does not complain of any pain or discomfort at this time. Pt being diuresed with Lasix; diuresing well and adhering to fluid restrictions. No S/S of cardiac or respiratory distress noted. Patient is on continuous cardiac monitoring. VS WDL. Call light within reach. Chair wheels locked. Will continue to monitor.

## 2020-03-08 NOTE — PROGRESS NOTES
[facility] Hospital Medicine  Progress Note    Admitting Team: IM-C  Attending Physician: Antoinette Goins MD  Date of Admit: 3/7/2020    Chief Complaint     Hand swelling (reports right hand swelling and pain x1 month.  states swelling has worsened this last week.  right hand visibly swollen)      Subjective:      History of Present Illness:  Hamlet Terrell is a 54 y.o. male with chronic combined systolic and diastolic heart failure (EF 25%), afib on coumadin, CAD, CVA, and history of PE, who was discharged from [facility] on 1/18 to home hospice on  gtt. Since discharge patient requested the  gtt be discontinued so his PICC was subsequently removed. Pt states that since PICC was removed, his RUE from hand to the elbow developed progressive swelling and pain to touch. Denies erythema, abrasions, or drainage. He reports some shortness of breath and weight gain of about 20 lbs since discharge, but that these symptoms are not bothering him. The reason for ED visit was for the arm swelling only. States he would like to continue home hospice on discharge. He is happy with the care he is receiving with home hospice.     In ED pt found to have Hb 5.2. , bilateral interstitial markings on CXR, and trop of 0.2. ECG w/ AF with PVC's. He is receiving 1 unit prbc. Also received 80 mg Lasix IV in ED with minimal output.     Interval History  Reports no improvement in hand swelling. RUE u/s negative for DVT. Down 1 lb today. Renal function improving. Cont Lasix gtt.    Past Medical History:  Past Medical History:   Diagnosis Date    Anticoagulant long-term use     Cardiomegaly     Chronic combined systolic and diastolic congestive heart failure     Coronary artery disease     Heart attack 2006    Hypertension     Hyperthyroidism, subclinical 1/2/2013    MI (myocardial infarction) 9/22/2013    MI in 2009      Paroxysmal atrial fibrillation     PE (pulmonary embolism) 1/1/2013    IN 2010     S/P  ablation of atrial flutter 2008    Stroke 2009    no residual weaknesses       Past Surgical History:  Past Surgical History:   Procedure Laterality Date    COLONOSCOPY N/A 12/2/2019    Procedure: COLONOSCOPY;  Surgeon: Scott Rosario MD;  [facility]  Service: Endoscopy;  Laterality: N/A;    ESOPHAGOGASTRODUODENOSCOPY N/A 12/2/2019    Procedure: EGD (ESOPHAGOGASTRODUODENOSCOPY);  Surgeon: Scott Rosario MD;  [facility]  Service: Endoscopy;  Laterality: N/A;    RADIOFREQUENCY ABLATION  01/08/2008    for atrial flutter       Allergies:  Review of patient's allergies indicates:   Allergen Reactions    Acetaminophen      Itching    Oxycodone-acetaminophen      Other reaction(s): Itching    Ace inhibitors Other (See Comments)     cough       Home Medications:  Prior to Admission medications    Medication Sig Start Date End Date Taking? Authorizing Provider   albuterol (PROVENTIL/VENTOLIN HFA) 90 mcg/actuation inhaler Inhale 1-2 puffs into the lungs every 6 (six) hours as needed for Wheezing. Rescue 1/31/19 1/31/20  Monika Starr,    albuterol-ipratropium (DUO-NEB) 2.5 mg-0.5 mg/3 mL nebulizer solution Take 3 mLs by nebulization every 6 (six) hours as needed for Wheezing. Rescue    Historical Provider, MD   aspirin (ECOTRIN) 81 MG EC tablet Take 81 mg by mouth once daily.    Historical Provider, MD   BISACODYL RECT Place 10 mg rectally daily as needed.    Historical Provider, MD   digoxin (LANOXIN) 125 mcg tablet Take 125 mcg by mouth once daily.    Historical Provider, MD   DOBUTamine (DOBUTREX) 500 mg/250 mL (2,000 mcg/mL) Inject 322.75 mcg/min into the vein continuous. 1/17/20   Markus Howard MD   ferrous sulfate 325 (65 FE) MG EC tablet Take 1 tablet (325 mg total) by mouth once daily. 12/8/19 3/7/20  Markus Howard MD   furosemide (LASIX) 80 MG tablet Take 1 tablet (80 mg total) by mouth 2 (two) times daily. 7/17/19 7/16/20  So Tyler MD    hydrALAZINE (APRESOLINE) 25 MG tablet Take 25 mg by mouth 2 (two) times daily.    Historical Provider, MD   isosorbide dinitrate (ISORDIL) 20 MG tablet Take 20 mg by mouth 2 (two) times daily.    Historical Provider, MD   isosorbide-hydrALAZINE 20-37.5 mg (BIDIL) 20-37.5 mg Tab Take 1 tablet by mouth 2 (two) times daily.  Patient not taking: Reported on 1/6/2020 7/17/19 7/16/20  So Tyler MD   lorazepam 2 mg/ml oral conc (ATIVAN) 2 mg/mL Conc Take 2 mg by mouth every 4 (four) hours as needed.    Historical Provider, MD   methocarbamol (ROBAXIN) 500 MG Tab Take 500 mg by mouth 3 (three) times daily as needed.    Historical Provider, MD   metOLazone (ZAROXOLYN) 10 MG tablet Take 10 mg by mouth every other day.    Historical Provider, MD   morphine (MS CONTIN) 15 MG 12 hr tablet Take 15 mg by mouth 2 (two) times daily.    Historical Provider, MD   morphine 100 mg/5 mL (20 mg/mL) concentrated solution Take 10 mg by mouth every 2 (two) hours as needed for Pain.    Historical Provider, MD   nitroGLYCERIN (NITROSTAT) 0.4 MG SL tablet Place 1 tablet (0.4 mg total) under the tongue every 5 (five) minutes as needed for Chest pain. Tablet, Sublingual Sublingual 11/1/17   KAREEM Ulloa MD   ondansetron (ZOFRAN) 4 MG tablet Take 1 tablet (4 mg total) by mouth every 8 (eight) hours as needed for Nausea. 1/17/20   Markus Howard MD   oxyCODONE (ROXICODONE) 15 MG Tab Take 1 tablet (15 mg total) by mouth every 4 (four) hours as needed for Pain. 1/17/20   Markus Howard MD   polyethylene glycol (GLYCOLAX) 17 gram PwPk Take by mouth once daily.    Historical Provider, MD   potassium chloride (KLOR-CON SPRINKLE) 10 MEQ CpSR Take 10 mEq by mouth once daily.    Historical Provider, MD   senna-docusate 8.6-50 mg (PERICOLACE) 8.6-50 mg per tablet Take 1 tablet by mouth 2 (two) times daily as needed for Constipation. 1/17/20   Markus Howard MD   warfarin (COUMADIN) 1 MG tablet Take 7 tablets (7 mg total) by mouth  Daily.  Patient taking differently: Take 7 mg by mouth every evening.  12/8/19 3/7/20  Markus Howard MD       Family History:  Family History   Problem Relation Age of Onset    Hypertension Mother     Stroke Mother     Hypertension Father     Alcohol abuse Father     Hypertension Sister     Hypertension Brother        Social History:  Social History     Tobacco Use    Smoking status: Never Smoker    Smokeless tobacco: Never Used   Substance Use Topics    Alcohol use: No    Drug use: No       Review of Systems:  As per HPI  General: no fever, no chills, no weight loss, no fatigue  Nose, Sinuses: no rhinorrhea, no facial pain, no epistaxis  Cardiovascular: no chest pain, no orthopnea  Respiratory: no cough, no wheezes, no chest pain  Urinary: no dysuria, no increased frequency, no hematuria  Hematologic: no easy bruising, no overt bleeding, no petechiae  Endocrine: no heat intolerance, no cold intolerance, no polyuria  Neurologic: no seizures, no tremors, no numbness  Psychiatric: no hallucination, no depression, no suicidal ideation  Skin: no rashes, no pruritus, no pallor  All other systems reviewed & are negative.        Objective:   Last 24 Hour Vital Signs:  BP  Min: 101/49  Max: 112/59  Temp  Av.7 °F (36.5 °C)  Min: 96.6 °F (35.9 °C)  Max: 98 °F (36.7 °C)  Pulse  Av.5  Min: 67  Max: 106  Resp  Av  Min: 16  Max: 20  SpO2  Av %  Min: 94 %  Max: 99 %  Weight  Av.2 kg (262 lb 12.6 oz)  Min: 119.2 kg (262 lb 12.6 oz)  Max: 119.2 kg (262 lb 12.6 oz)  Body mass index is 38.81 kg/m².  I/O last 3 completed shifts:  In: 1478.4 [P.O.:780; I.V.:246.8; Blood:451.7]  Out: 1805 [Urine:1805]    Physical Examination:  GEN: AAOx3, NAD  HEENT: NCAT, MMM, PERRL, EOMI, oropharynx clear  CV: RRR, no m/r, unable to assess jvp due to body habitus  RESP: CTAB, no wheezes/crackles  ABD: soft, NTND, normoactive BS, no organomegaly  EXTR: no c/c, 2+ distal pulses x 4, b/l pitting edema BLE, R hand  swollen and painful, no erythema/induration/fluctuance noted  NEURO: PERRL, EOMI, 5/5 motor strength all four extremities, intact sensation to light touch, no focal deficits  SKIN: no rashes, lesions, or color changes  PSYCH: normal affect     Laboratory:  I have reviewed all pertinent laboratory data within the past 24 hours.    Other Results:  EKG (my interpretation): AF with PVCs    Radiology:  I have reviewed all pertinent radiology imaging within the past 24 hours.    Prior records reviewed.     Assessment/Plan:     Hamlet Terrell is a 54 y.o. male with:    Acute on Chronic Combined Systolic and Diastolic Heart Failure  Cardiomyopathy  Patient with advanced heart failure and refuses discussion for advanced options with HTS. Patient without proper response to diuretics and has proven to be  dependent. Patient with progressive renal dysfunction yet refuses outpatient HD, making any attempts at inpatient HD temporizing given the severity of his cardiac dysfunction.   · . Trop 0.2, likely demand ischemia, trend. CXR with pulmonary edema. Dry weight in 240's. Pt reports weight of 260's at home.  ·  gtt discontinued by patient after discharge to home hospice  · Strict I&Os with daily weights  · Cont home Bidil  · No ACEI/ARB due to renal function  · Cont home qother day metolazone  · Minimal output with IV Lasix 80 mg in ED. Give 120 mg bolus + Lasix 30 mg/hr gtt     RUE swelling  - US ordered -  Negative for DVT  - Elevate & supportive care    JEAN-PIERRE on CKD3  · Cr 2.7 on discharge in January. 4.5 on presentation  · Cardiorenal   · Received temporary HD during last admission. Per last discharge, no indication at present time for HD as treatments would be only temporizing and goal is to maximize patient's time at home/quality of life  · Monitor w/ diuresis     Iron Def Anemia  - Hb 5.2 on presentation. Baseline 6.5. ED ordered 1 unit prbc. Only increased to 5.4. Another unit ordered.  - Denies bleeding.  Likely dilutional in setting of fluid overload  - Monitor  - Cont Fe    Chronic AFib  Long Term Use of Anticoagulants  Supratherapeutic INR  · Chronic issue  · Rate controlled  · INR 3.3 > 2.7. Resume coumadin at reduced dose from 7 mg to 6 mg. Daily INR's     Essential HTN  · Chronic and stable  · Continue Bidil BID     CAD  History of MI  · Chronic and stable  · Continue Aspirin 81mg PO daily  · Continue Bidil BID    History of VTE  History of Stroke  · Chronic and stable  · Continue Aspirin 81mg PO daily  · Continue Warfarin       Diet- cardiac, 1.2 L  PPX- INR 3.3  Dispo- pending diuresis; will return to home hospice        Antoinette Goins MD  [facility] Medicine Staff  [facility]

## 2020-03-08 NOTE — PLAN OF CARE
Plan of care discussed with patient. Patient is free of fall/trauma/injury. Denies CP, SOB, or pain/discomfort. Lasix gtt initiated. All questions addressed. Will continue to monitor.

## 2020-03-09 LAB
ANION GAP SERPL CALC-SCNC: 18 MMOL/L (ref 8–16)
BASOPHILS # BLD AUTO: 0.05 K/UL (ref 0–0.2)
BASOPHILS NFR BLD: 0.6 % (ref 0–1.9)
BUN SERPL-MCNC: 131 MG/DL (ref 6–20)
CALCIUM SERPL-MCNC: 9.5 MG/DL (ref 8.7–10.5)
CHLORIDE SERPL-SCNC: 92 MMOL/L (ref 95–110)
CO2 SERPL-SCNC: 29 MMOL/L (ref 23–29)
CREAT SERPL-MCNC: 3.2 MG/DL (ref 0.5–1.4)
DIFFERENTIAL METHOD: ABNORMAL
EOSINOPHIL # BLD AUTO: 0.4 K/UL (ref 0–0.5)
EOSINOPHIL NFR BLD: 4.6 % (ref 0–8)
ERYTHROCYTE [DISTWIDTH] IN BLOOD BY AUTOMATED COUNT: 16.9 % (ref 11.5–14.5)
EST. GFR  (AFRICAN AMERICAN): 24.2 ML/MIN/1.73 M^2
EST. GFR  (NON AFRICAN AMERICAN): 21 ML/MIN/1.73 M^2
GLUCOSE SERPL-MCNC: 119 MG/DL (ref 70–110)
HCT VFR BLD AUTO: 21 % (ref 40–54)
HGB BLD-MCNC: 6.2 G/DL (ref 14–18)
IMM GRANULOCYTES # BLD AUTO: 0.1 K/UL (ref 0–0.04)
IMM GRANULOCYTES NFR BLD AUTO: 1.2 % (ref 0–0.5)
INR PPP: 2.2 (ref 0.8–1.2)
LYMPHOCYTES # BLD AUTO: 0.8 K/UL (ref 1–4.8)
LYMPHOCYTES NFR BLD: 9.9 % (ref 18–48)
MAGNESIUM SERPL-MCNC: 2.6 MG/DL (ref 1.6–2.6)
MCH RBC QN AUTO: 24.8 PG (ref 27–31)
MCHC RBC AUTO-ENTMCNC: 29.5 G/DL (ref 32–36)
MCV RBC AUTO: 84 FL (ref 82–98)
MONOCYTES # BLD AUTO: 1.4 K/UL (ref 0.3–1)
MONOCYTES NFR BLD: 17.2 % (ref 4–15)
NEUTROPHILS # BLD AUTO: 5.4 K/UL (ref 1.8–7.7)
NEUTROPHILS NFR BLD: 66.5 % (ref 38–73)
NRBC BLD-RTO: 1 /100 WBC
PLATELET # BLD AUTO: 324 K/UL (ref 150–350)
PMV BLD AUTO: 9.8 FL (ref 9.2–12.9)
POTASSIUM SERPL-SCNC: 3.8 MMOL/L (ref 3.5–5.1)
PROTHROMBIN TIME: 20.5 SEC (ref 9–12.5)
RBC # BLD AUTO: 2.5 M/UL (ref 4.6–6.2)
SODIUM SERPL-SCNC: 139 MMOL/L (ref 136–145)
WBC # BLD AUTO: 8.18 K/UL (ref 3.9–12.7)

## 2020-03-09 PROCEDURE — 85610 PROTHROMBIN TIME: CPT

## 2020-03-09 PROCEDURE — 36415 COLL VENOUS BLD VENIPUNCTURE: CPT

## 2020-03-09 PROCEDURE — 85025 COMPLETE CBC W/AUTO DIFF WBC: CPT

## 2020-03-09 PROCEDURE — 83735 ASSAY OF MAGNESIUM: CPT

## 2020-03-09 PROCEDURE — 25000003 PHARM REV CODE 250: Performed by: HOSPITALIST

## 2020-03-09 PROCEDURE — 94761 N-INVAS EAR/PLS OXIMETRY MLT: CPT

## 2020-03-09 PROCEDURE — 20600001 HC STEP DOWN PRIVATE ROOM

## 2020-03-09 PROCEDURE — 99233 SBSQ HOSP IP/OBS HIGH 50: CPT | Mod: ,,, | Performed by: HOSPITALIST

## 2020-03-09 PROCEDURE — 63600175 PHARM REV CODE 636 W HCPCS: Performed by: HOSPITALIST

## 2020-03-09 PROCEDURE — 80048 BASIC METABOLIC PNL TOTAL CA: CPT

## 2020-03-09 PROCEDURE — 99233 PR SUBSEQUENT HOSPITAL CARE,LEVL III: ICD-10-PCS | Mod: ,,, | Performed by: HOSPITALIST

## 2020-03-09 RX ORDER — METOLAZONE 2.5 MG/1
5 TABLET ORAL ONCE
Status: COMPLETED | OUTPATIENT
Start: 2020-03-09 | End: 2020-03-09

## 2020-03-09 RX ORDER — POTASSIUM CHLORIDE 20 MEQ/15ML
20 SOLUTION ORAL ONCE
Status: COMPLETED | OUTPATIENT
Start: 2020-03-09 | End: 2020-03-09

## 2020-03-09 RX ORDER — WARFARIN 6 MG/1
6 TABLET ORAL DAILY
Qty: 30 TABLET | Refills: 11 | Status: ON HOLD | OUTPATIENT
Start: 2020-03-09 | End: 2020-05-14 | Stop reason: HOSPADM

## 2020-03-09 RX ORDER — FERROUS SULFATE 325(65) MG
325 TABLET ORAL DAILY
Qty: 30 TABLET | Refills: 11 | Status: SHIPPED | OUTPATIENT
Start: 2020-03-09 | End: 2020-06-07

## 2020-03-09 RX ORDER — POTASSIUM CHLORIDE 20 MEQ/1
20 TABLET, EXTENDED RELEASE ORAL ONCE
Status: DISCONTINUED | OUTPATIENT
Start: 2020-03-09 | End: 2020-03-09

## 2020-03-09 RX ORDER — TORSEMIDE 20 MG/1
40 TABLET ORAL 2 TIMES DAILY
Qty: 120 TABLET | Refills: 11 | Status: SHIPPED | OUTPATIENT
Start: 2020-03-09 | End: 2021-03-09

## 2020-03-09 RX ADMIN — MORPHINE SULFATE 15 MG: 15 TABLET, EXTENDED RELEASE ORAL at 09:03

## 2020-03-09 RX ADMIN — HYDRALAZINE HYDROCHLORIDE 25 MG: 25 TABLET, FILM COATED ORAL at 09:03

## 2020-03-09 RX ADMIN — ASPIRIN 81 MG: 81 TABLET, COATED ORAL at 08:03

## 2020-03-09 RX ADMIN — ISOSORBIDE DINITRATE 20 MG: 20 TABLET ORAL at 08:03

## 2020-03-09 RX ADMIN — METOLAZONE 5 MG: 2.5 TABLET ORAL at 11:03

## 2020-03-09 RX ADMIN — HYDRALAZINE HYDROCHLORIDE 25 MG: 25 TABLET, FILM COATED ORAL at 08:03

## 2020-03-09 RX ADMIN — FUROSEMIDE 30 MG/HR: 10 INJECTION, SOLUTION INTRAMUSCULAR; INTRAVENOUS at 01:03

## 2020-03-09 RX ADMIN — POTASSIUM CHLORIDE 20 MEQ: 20 SOLUTION ORAL at 10:03

## 2020-03-09 RX ADMIN — FERROUS SULFATE TAB EC 325 MG (65 MG FE EQUIVALENT) 325 MG: 325 (65 FE) TABLET DELAYED RESPONSE at 08:03

## 2020-03-09 RX ADMIN — DIGOXIN 125 MCG: 125 TABLET ORAL at 08:03

## 2020-03-09 RX ADMIN — ISOSORBIDE DINITRATE 20 MG: 20 TABLET ORAL at 09:03

## 2020-03-09 RX ADMIN — MORPHINE SULFATE 15 MG: 15 TABLET, EXTENDED RELEASE ORAL at 08:03

## 2020-03-09 RX ADMIN — FUROSEMIDE 30 MG/HR: 10 INJECTION, SOLUTION INTRAMUSCULAR; INTRAVENOUS at 06:03

## 2020-03-09 NOTE — PLAN OF CARE
Plan of care reviewed with pt, verbalized understanding  Answered questions  Free from falls and injury  Afebrile  Afib on tele  LSX 30mg/hr  Will continue to monitor

## 2020-03-09 NOTE — PLAN OF CARE
03/09/20 0722   Discharge Assessment   Assessment Type Discharge Planning Assessment   Assessment information obtained from? Medical Record   Expected Length of Stay (days) 4   Prior to hospitilization cognitive status: Alert/Oriented   Prior to hospitalization functional status: Independent   Current cognitive status: Alert/Oriented   Current Functional Status: Independent   Lives With alone   Is patient able to care for self after discharge? Unable to determine at this time (comments)   Patient's perception of discharge disposition home or selfcare;hospice/home   Readmission Within the Last 30 Days no previous admission in last 30 days   Patient currently being followed by outpatient case management? No   Patient currently receives any other outside agency services? No   Equipment Currently Used at Home none   Do you have any problems affording any of your prescribed medications? TBD   Is the patient taking medications as prescribed? yes   Does the patient receive services at the [facility]madin Clinic? No   Discharge Plan A Home;Hospice/home   DME Needed Upon Discharge  none   Admitted with CHF and R UE swelling. Pt known to this CM from previous admissions. Lives alone, independent in his ADLs. Pt is current with [facility] home hospice. Plan is to return home with hospice.

## 2020-03-09 NOTE — PLAN OF CARE
[facility]  Department of Hospital Medicine  [address] [place] [address]  [phone] after hours  [phone] fax    HOSPICE  ORDERS    03/09/2020    Admit to Hospice:  Home Service   Diagnoses:   Active Hospital Problems    Diagnosis  POA    *Acute on chronic combined systolic and diastolic heart failure [I50.43]  Yes    Swelling of right upper extremity [M79.89]  Yes    Acute kidney injury [N17.9]  Yes    Stage 3 chronic kidney disease [N18.3]  Yes    Atrial fibrillation, chronic [I48.20]  Yes     Chronic      Resolved Hospital Problems   No resolved problems to display.       Hospice Qualifying Diagnoses: End Stage Heart Failure, Stage 4       Patient has a life expectancy < 6 months due to:  Primary Hospice Diagnosis:  End Stage Heart Failure, Stage 4    Comorbid Conditions Contributing to Decline:  CKD4, CVA  Vital Signs: Routine per Hospice Protocol.    Code Status: full      Allergies:   Review of patient's allergies indicates:   Allergen Reactions    Acetaminophen      Itching    Oxycodone-acetaminophen      Other reaction(s): Itching    Ace inhibitors Other (See Comments)     cough     Labs  Weekly INR  Weekly BMP & Mg, or per hospice protocol.  Digoxin level on 3/15 prior to digoxin administration that morning.    Diet: cardiac, fluid restriction 1.5 L daily, 2 gram Na daily  Activities: As tolerated    Nursing: Per Hospice Routine.    Daily weights.     Medications:      Hamlet Terrell   Home Medication Instructions ELKIN:[id number]    Printed on:03/09/20 113   Medication Information                      albuterol (PROVENTIL/VENTOLIN HFA) 90 mcg/actuation inhaler  Inhale 1-2 puffs into the lungs every 6 (six) hours as needed for Wheezing. Rescue             albuterol-ipratropium (DUO-NEB) 2.5 mg-0.5 mg/3 mL nebulizer solution  Take 3 mLs by nebulization every 6 (six) hours as needed for Wheezing. Rescue             aspirin (ECOTRIN) 81 MG EC  tablet  Take 81 mg by mouth once daily.             BISACODYL RECT  Place 10 mg rectally daily as needed.             digoxin (LANOXIN) 125 mcg tablet  Take 125 mcg by mouth once daily.             ferrous sulfate 325 (65 FE) MG EC tablet  Take 1 tablet (325 mg total) by mouth once daily.             hydrALAZINE (APRESOLINE) 25 MG tablet  Take 25 mg by mouth 3 (three) times daily.             isosorbide dinitrate (ISORDIL) 20 MG tablet  Take 20 mg by mouth 3 (three) times daily.                                   metOLazone (ZAROXOLYN) 10 MG tablet  Take 10 mg by mouth every other day.                                   nitroGLYCERIN (NITROSTAT) 0.4 MG SL tablet  Place 1 tablet (0.4 mg total) under the tongue every 5 (five) minutes as needed for Chest pain. Tablet, Sublingual Sublingual             ondansetron (ZOFRAN) 4 MG tablet  Take 1 tablet (4 mg total) by mouth every 8 (eight) hours as needed for Nausea.                          polyethylene glycol (GLYCOLAX) 17 gram PwPk  Take by mouth once daily.             potassium chloride (KLOR-CON SPRINKLE) 10 MEQ CpSR  Take 10 mEq by mouth once daily.             senna-docusate 8.6-50 mg (PERICOLACE) 8.6-50 mg per tablet  Take 1 tablet by mouth 2 (two) times daily as needed for Constipation.             torsemide (DEMADEX) 20 MG Tab  Take 2 tablets (40 mg total) by mouth 2 (two) times daily.             warfarin (COUMADIN) 6 MG tablet  Take 1 tablet (6 mg total) by mouth Daily.               PAIN MEDICATIONS AND ANXIOLYTICS TO BE DETERMINED BY HOSPICE PROVIDERS.     Future Orders:  Hospice Medical Director may dictate new orders for comfortable care measures & sign death certificate.        _________________________________  Antoinette Goins MD  03/09/2020

## 2020-03-09 NOTE — PLAN OF CARE
Patient is current with [facility]. SW sent hospice orders to [facility] for review. Pt will likely discharge on Wednesday.     Vaishali Osorio LMSW  [facility]

## 2020-03-09 NOTE — PLAN OF CARE
Pt remains free of fall and injury. Does not complain of any pain or discomfort at this time. Received 1   U PRBC. No S/S of adverse reaction noted. Lasix 80 mg IVP administered upon completion of blood transfusion. Continuous Lasix infusion resumed; diuresing well and adhering to fluid restrictions. No S/S of cardiac or respiratory distress noted. Patient is on continuous cardiac monitoring. VS WDL. Call light within reach. Chair wheels locked. Will continue to monitor.

## 2020-03-09 NOTE — PLAN OF CARE
03/09/20 0738   Post-Acute Status   Post-Acute Authorization Home Health/Hospice   Home Health/Hospice Status Awaiting Internal Medical Clearance

## 2020-03-09 NOTE — NURSING
Pt c/o cramps and spasms, Pt refused offer of muscle relaxer, Pt accepted pain med. MD ordered Lasix 80 mg IV push after blood infusion then continue Lasix drip

## 2020-03-09 NOTE — PROGRESS NOTES
[facility] Hospital Medicine  Progress Note    Admitting Team: IM-C  Attending Physician: Antoinette Goins MD  Date of Admit: 3/7/2020    Chief Complaint     Hand swelling (reports right hand swelling and pain x1 month.  states swelling has worsened this last week.  right hand visibly swollen)      Subjective:      History of Present Illness:  Hamlet Terrell is a 54 y.o. male with chronic combined systolic and diastolic heart failure (EF 25%), afib on coumadin, CAD, CVA, and history of PE, who was discharged from [facility] on 1/18 to home hospice on  gtt. Since discharge patient requested the  gtt be discontinued so his PICC was subsequently removed. Pt states that since PICC was removed, his RUE from hand to the elbow developed progressive swelling and pain to touch. Denies erythema, abrasions, or drainage. He reports some shortness of breath and weight gain of about 20 lbs since discharge, but that these symptoms are not bothering him. The reason for ED visit was for the arm swelling only. States he would like to continue home hospice on discharge. He is happy with the care he is receiving with home hospice.     In ED pt found to have Hb 5.2. , bilateral interstitial markings on CXR, and trop of 0.2. ECG w/ AF with PVC's. He is receiving 1 unit prbc. Also received 80 mg Lasix IV in ED with minimal output.     Interval History  Pt in great spirits. He would like to get back to dry weight of 240 lb. Hand swelling much improved with warm compresses. Hb improved to 6.2 after 1 unit yesterday. Down 9 lb today. Renal function improving. Cont Lasix gtt. Responded quite well to metolazone yesterday; give 5 mg today.     Past Medical History:  Past Medical History:   Diagnosis Date    Anticoagulant long-term use     Cardiomegaly     Chronic combined systolic and diastolic congestive heart failure     Coronary artery disease     Heart attack 2006    Hypertension     Hyperthyroidism,  subclinical 1/2/2013    MI (myocardial infarction) 9/22/2013    MI in 2009      Paroxysmal atrial fibrillation     PE (pulmonary embolism) 1/1/2013    IN 2010     S/P ablation of atrial flutter 2008    Stroke 2009    no residual weaknesses       Past Surgical History:  Past Surgical History:   Procedure Laterality Date    COLONOSCOPY N/A 12/2/2019    Procedure: COLONOSCOPY;  Surgeon: Scott Rosario MD;  [facility]  Service: Endoscopy;  Laterality: N/A;    ESOPHAGOGASTRODUODENOSCOPY N/A 12/2/2019    Procedure: EGD (ESOPHAGOGASTRODUODENOSCOPY);  Surgeon: Scott Rosario MD;  [facility]  Service: Endoscopy;  Laterality: N/A;    RADIOFREQUENCY ABLATION  01/08/2008    for atrial flutter       Allergies:  Review of patient's allergies indicates:   Allergen Reactions    Acetaminophen      Itching    Oxycodone-acetaminophen      Other reaction(s): Itching    Ace inhibitors Other (See Comments)     cough       Home Medications:  Prior to Admission medications    Medication Sig Start Date End Date Taking? Authorizing Provider   albuterol (PROVENTIL/VENTOLIN HFA) 90 mcg/actuation inhaler Inhale 1-2 puffs into the lungs every 6 (six) hours as needed for Wheezing. Rescue 1/31/19 1/31/20  Monika Starr,    albuterol-ipratropium (DUO-NEB) 2.5 mg-0.5 mg/3 mL nebulizer solution Take 3 mLs by nebulization every 6 (six) hours as needed for Wheezing. Rescue    Historical Provider, MD   aspirin (ECOTRIN) 81 MG EC tablet Take 81 mg by mouth once daily.    Historical Provider, MD   BISACODYL RECT Place 10 mg rectally daily as needed.    Historical Provider, MD   digoxin (LANOXIN) 125 mcg tablet Take 125 mcg by mouth once daily.    Historical Provider, MD   DOBUTamine (DOBUTREX) 500 mg/250 mL (2,000 mcg/mL) Inject 322.75 mcg/min into the vein continuous. 1/17/20   Markus Howard MD   ferrous sulfate 325 (65 FE) MG EC tablet Take 1 tablet (325 mg total) by mouth once daily.  12/8/19 3/7/20  Markus Howard MD   furosemide (LASIX) 80 MG tablet Take 1 tablet (80 mg total) by mouth 2 (two) times daily. 7/17/19 7/16/20  So Tyler MD   hydrALAZINE (APRESOLINE) 25 MG tablet Take 25 mg by mouth 2 (two) times daily.    Historical Provider, MD   isosorbide dinitrate (ISORDIL) 20 MG tablet Take 20 mg by mouth 2 (two) times daily.    Historical Provider, MD   isosorbide-hydrALAZINE 20-37.5 mg (BIDIL) 20-37.5 mg Tab Take 1 tablet by mouth 2 (two) times daily.  Patient not taking: Reported on 1/6/2020 7/17/19 7/16/20  So Tyler MD   lorazepam 2 mg/ml oral conc (ATIVAN) 2 mg/mL Conc Take 2 mg by mouth every 4 (four) hours as needed.    Historical Provider, MD   methocarbamol (ROBAXIN) 500 MG Tab Take 500 mg by mouth 3 (three) times daily as needed.    Historical Provider, MD   metOLazone (ZAROXOLYN) 10 MG tablet Take 10 mg by mouth every other day.    Historical Provider, MD   morphine (MS CONTIN) 15 MG 12 hr tablet Take 15 mg by mouth 2 (two) times daily.    Historical Provider, MD   morphine 100 mg/5 mL (20 mg/mL) concentrated solution Take 10 mg by mouth every 2 (two) hours as needed for Pain.    Historical Provider, MD   nitroGLYCERIN (NITROSTAT) 0.4 MG SL tablet Place 1 tablet (0.4 mg total) under the tongue every 5 (five) minutes as needed for Chest pain. Tablet, Sublingual Sublingual 11/1/17   KAREEM Ulloa MD   ondansetron (ZOFRAN) 4 MG tablet Take 1 tablet (4 mg total) by mouth every 8 (eight) hours as needed for Nausea. 1/17/20   Markus Howard MD   oxyCODONE (ROXICODONE) 15 MG Tab Take 1 tablet (15 mg total) by mouth every 4 (four) hours as needed for Pain. 1/17/20   Markus Howard MD   polyethylene glycol (GLYCOLAX) 17 gram PwPk Take by mouth once daily.    Historical Provider, MD   potassium chloride (KLOR-CON SPRINKLE) 10 MEQ CpSR Take 10 mEq by mouth once daily.    Historical Provider, MD   senna-docusate 8.6-50 mg (PERICOLACE) 8.6-50 mg per tablet Take 1  tablet by mouth 2 (two) times daily as needed for Constipation. 20   Markus Howard MD   warfarin (COUMADIN) 1 MG tablet Take 7 tablets (7 mg total) by mouth Daily.  Patient taking differently: Take 7 mg by mouth every evening.  12/8/19 3/7/20  Markus Howard MD       Family History:  Family History   Problem Relation Age of Onset    Hypertension Mother     Stroke Mother     Hypertension Father     Alcohol abuse Father     Hypertension Sister     Hypertension Brother        Social History:  Social History     Tobacco Use    Smoking status: Never Smoker    Smokeless tobacco: Never Used   Substance Use Topics    Alcohol use: No    Drug use: No       Review of Systems:  As per HPI  General: no fever, no chills, no weight loss, no fatigue  Nose, Sinuses: no rhinorrhea, no facial pain, no epistaxis  Cardiovascular: no chest pain, no orthopnea  Respiratory: no cough, no wheezes, no chest pain  Urinary: no dysuria, no increased frequency, no hematuria  Hematologic: no easy bruising, no overt bleeding, no petechiae  Endocrine: no heat intolerance, no cold intolerance, no polyuria  Neurologic: no seizures, no tremors, no numbness  Psychiatric: no hallucination, no depression, no suicidal ideation  Skin: no rashes, no pruritus, no pallor  All other systems reviewed & are negative.        Objective:   Last 24 Hour Vital Signs:  BP  Min: 101/49  Max: 124/55  Temp  Av.5 °F (36.4 °C)  Min: 96.4 °F (35.8 °C)  Max: 98.6 °F (37 °C)  Pulse  Av.2  Min: 72  Max: 117  Resp  Av.3  Min: 16  Max: 20  SpO2  Av.6 %  Min: 94 %  Max: 98 %  Weight  Av kg (253 lb 8.5 oz)  Min: 115 kg (253 lb 8.5 oz)  Max: 115 kg (253 lb 8.5 oz)  Body mass index is 37.44 kg/m².  I/O last 3 completed shifts:  In: 2203.5 [P.O.:1025; I.V.:522.3; Blood:656.3]  Out: 3520 [Urine:3520]    Physical Examination:  GEN: AAOx3, NAD  HEENT: NCAT, MMM, PERRL, EOMI, oropharynx clear  CV: RRR, no m/r, unable to assess jvp due to  body habitus  RESP: CTAB, no wheezes/crackles  ABD: soft, NTND, normoactive BS, no organomegaly  EXTR: no c/c, 2+ distal pulses x 4, b/l pitting edema BLE with chronic venous stasis changes, R hand swelling improved, no erythema/induration/fluctuance noted  NEURO: PERRL, EOMI, 5/5 motor strength all four extremities, intact sensation to light touch, no focal deficits  SKIN: no rashes, lesions, or color changes  PSYCH: normal affect     Laboratory:  I have reviewed all pertinent laboratory data within the past 24 hours.    Other Results:  EKG (my interpretation): AF with PVCs    Radiology:  I have reviewed all pertinent radiology imaging within the past 24 hours.    Prior records reviewed.     Assessment/Plan:     Hamlet Terrell is a 54 y.o. male with:    Acute on Chronic Combined Systolic and Diastolic Heart Failure  Cardiomyopathy  Patient with advanced heart failure and refuses discussion for advanced options with HTS. Patient without proper response to diuretics and has proven to be  dependent. Patient with progressive renal dysfunction yet refuses outpatient HD, making any attempts at inpatient HD temporizing given the severity of his cardiac dysfunction.   · . Trop 0.2, likely demand ischemia, trend. CXR with pulmonary edema. Dry weight in 240's. Pt reports weight of 260's at home.  ·  gtt discontinued by patient after discharge to home hospice  · Strict I&Os with daily weights  · Cont home Bidil  · No ACEI/ARB due to renal function  · Cont home qother day metolazone  · Minimal output with IV Lasix 80 mg in ED. Doing well with Lasix 30 mg/hr gtt with metolazone augmentation.      RUE swelling  - US ordered -  Negative for DVT  - Elevate & supportive care    JEAN-PIERRE on CKD3  · Cr 2.7 on discharge in January. 4.5 on presentation  · Cardiorenal   · Received temporary HD during last admission. Per last discharge, no indication at present time for HD as treatments would be only temporizing and goal is to  maximize patient's time at home/quality of life  · Monitor w/ diuresis - improving     Iron Def Anemia  - Hb 5.2 on presentation. Baseline 6.5. S/p 2 units with Hb back to baseline  - Denies bleeding. Likely dilutional in setting of fluid overload  - Monitor  - Cont Fe    Chronic AFib  Long Term Use of Anticoagulants  Supratherapeutic INR  · Chronic issue  · Rate controlled  · INR 3.3 > 2.7 > 2.2. Resume coumadin at reduced dose 6 mg. Daily INR's     Essential HTN  · Chronic and stable  · Continue Bidil BID     CAD  History of MI  · Chronic and stable  · Continue Aspirin 81mg PO daily  · Continue Bidil BID    History of VTE  History of Stroke  · Chronic and stable  · Continue Aspirin 81mg PO daily  · Continue Warfarin       Diet- cardiac, 1.2 L  PPX- INR 3.3  Dispo- pending diuresis; will return to home hospice        Antoinette Goins MD  [facility] Medicine Staff  [facility]

## 2020-03-10 LAB
ANION GAP SERPL CALC-SCNC: 14 MMOL/L (ref 8–16)
BASOPHILS # BLD AUTO: 0.04 K/UL (ref 0–0.2)
BASOPHILS NFR BLD: 0.6 % (ref 0–1.9)
BLD PROD TYP BPU: NORMAL
BLOOD UNIT EXPIRATION DATE: NORMAL
BLOOD UNIT TYPE CODE: 8400
BLOOD UNIT TYPE: NORMAL
BUN SERPL-MCNC: 133 MG/DL (ref 6–20)
CALCIUM SERPL-MCNC: 9.5 MG/DL (ref 8.7–10.5)
CHLORIDE SERPL-SCNC: 90 MMOL/L (ref 95–110)
CO2 SERPL-SCNC: 35 MMOL/L (ref 23–29)
CODING SYSTEM: NORMAL
CREAT SERPL-MCNC: 2.8 MG/DL (ref 0.5–1.4)
DIFFERENTIAL METHOD: ABNORMAL
DISPENSE STATUS: NORMAL
EOSINOPHIL # BLD AUTO: 0.3 K/UL (ref 0–0.5)
EOSINOPHIL NFR BLD: 4 % (ref 0–8)
ERYTHROCYTE [DISTWIDTH] IN BLOOD BY AUTOMATED COUNT: 17.9 % (ref 11.5–14.5)
EST. GFR  (AFRICAN AMERICAN): 28.5 ML/MIN/1.73 M^2
EST. GFR  (NON AFRICAN AMERICAN): 24.6 ML/MIN/1.73 M^2
GLUCOSE SERPL-MCNC: 119 MG/DL (ref 70–110)
HCT VFR BLD AUTO: 19.6 % (ref 40–54)
HGB BLD-MCNC: 5.7 G/DL (ref 14–18)
IMM GRANULOCYTES # BLD AUTO: 0.07 K/UL (ref 0–0.04)
IMM GRANULOCYTES NFR BLD AUTO: 1 % (ref 0–0.5)
INR PPP: 1.8 (ref 0.8–1.2)
LYMPHOCYTES # BLD AUTO: 0.7 K/UL (ref 1–4.8)
LYMPHOCYTES NFR BLD: 9.2 % (ref 18–48)
MAGNESIUM SERPL-MCNC: 2.4 MG/DL (ref 1.6–2.6)
MCH RBC QN AUTO: 25 PG (ref 27–31)
MCHC RBC AUTO-ENTMCNC: 29.1 G/DL (ref 32–36)
MCV RBC AUTO: 86 FL (ref 82–98)
MONOCYTES # BLD AUTO: 1.3 K/UL (ref 0.3–1)
MONOCYTES NFR BLD: 17.9 % (ref 4–15)
NEUTROPHILS # BLD AUTO: 4.9 K/UL (ref 1.8–7.7)
NEUTROPHILS NFR BLD: 67.3 % (ref 38–73)
NRBC BLD-RTO: 0 /100 WBC
PLATELET # BLD AUTO: 285 K/UL (ref 150–350)
PMV BLD AUTO: 9.6 FL (ref 9.2–12.9)
POTASSIUM SERPL-SCNC: 3.7 MMOL/L (ref 3.5–5.1)
PROTHROMBIN TIME: 17.6 SEC (ref 9–12.5)
RBC # BLD AUTO: 2.28 M/UL (ref 4.6–6.2)
SODIUM SERPL-SCNC: 139 MMOL/L (ref 136–145)
TRANS ERYTHROCYTES VOL PATIENT: NORMAL ML
WBC # BLD AUTO: 7.21 K/UL (ref 3.9–12.7)

## 2020-03-10 PROCEDURE — 99233 SBSQ HOSP IP/OBS HIGH 50: CPT | Mod: ,,, | Performed by: HOSPITALIST

## 2020-03-10 PROCEDURE — 27201040 HC RC 50 FILTER

## 2020-03-10 PROCEDURE — P9021 RED BLOOD CELLS UNIT: HCPCS

## 2020-03-10 PROCEDURE — 63600175 PHARM REV CODE 636 W HCPCS: Performed by: HOSPITALIST

## 2020-03-10 PROCEDURE — 20600001 HC STEP DOWN PRIVATE ROOM

## 2020-03-10 PROCEDURE — 83735 ASSAY OF MAGNESIUM: CPT

## 2020-03-10 PROCEDURE — 85025 COMPLETE CBC W/AUTO DIFF WBC: CPT

## 2020-03-10 PROCEDURE — 36415 COLL VENOUS BLD VENIPUNCTURE: CPT

## 2020-03-10 PROCEDURE — 85610 PROTHROMBIN TIME: CPT

## 2020-03-10 PROCEDURE — 36430 TRANSFUSION BLD/BLD COMPNT: CPT

## 2020-03-10 PROCEDURE — 25000003 PHARM REV CODE 250: Performed by: HOSPITALIST

## 2020-03-10 PROCEDURE — 80048 BASIC METABOLIC PNL TOTAL CA: CPT

## 2020-03-10 PROCEDURE — 99233 PR SUBSEQUENT HOSPITAL CARE,LEVL III: ICD-10-PCS | Mod: ,,, | Performed by: HOSPITALIST

## 2020-03-10 PROCEDURE — 94761 N-INVAS EAR/PLS OXIMETRY MLT: CPT

## 2020-03-10 RX ORDER — METOLAZONE 2.5 MG/1
10 TABLET ORAL ONCE
Status: COMPLETED | OUTPATIENT
Start: 2020-03-10 | End: 2020-03-10

## 2020-03-10 RX ORDER — WARFARIN 3 MG/1
6 TABLET ORAL DAILY
Status: DISCONTINUED | OUTPATIENT
Start: 2020-03-11 | End: 2020-03-11 | Stop reason: HOSPADM

## 2020-03-10 RX ORDER — WARFARIN 7.5 MG/1
7.5 TABLET ORAL ONCE
Status: COMPLETED | OUTPATIENT
Start: 2020-03-10 | End: 2020-03-10

## 2020-03-10 RX ORDER — OXYMETAZOLINE HCL 0.05 %
2 SPRAY, NON-AEROSOL (ML) NASAL ONCE
Status: COMPLETED | OUTPATIENT
Start: 2020-03-10 | End: 2020-03-10

## 2020-03-10 RX ORDER — SODIUM CHLORIDE 0.9 % (FLUSH) 0.9 %
10 SYRINGE (ML) INJECTION
Status: DISCONTINUED | OUTPATIENT
Start: 2020-03-10 | End: 2020-03-11 | Stop reason: HOSPADM

## 2020-03-10 RX ORDER — FUROSEMIDE 10 MG/ML
80 INJECTION INTRAMUSCULAR; INTRAVENOUS ONCE
Status: COMPLETED | OUTPATIENT
Start: 2020-03-10 | End: 2020-03-10

## 2020-03-10 RX ORDER — HYDROCODONE BITARTRATE AND ACETAMINOPHEN 500; 5 MG/1; MG/1
TABLET ORAL
Status: DISCONTINUED | OUTPATIENT
Start: 2020-03-10 | End: 2020-03-11 | Stop reason: HOSPADM

## 2020-03-10 RX ADMIN — DIGOXIN 125 MCG: 125 TABLET ORAL at 09:03

## 2020-03-10 RX ADMIN — OXYCODONE HYDROCHLORIDE 15 MG: 5 TABLET ORAL at 01:03

## 2020-03-10 RX ADMIN — ISOSORBIDE DINITRATE 20 MG: 20 TABLET ORAL at 09:03

## 2020-03-10 RX ADMIN — HYDRALAZINE HYDROCHLORIDE 25 MG: 25 TABLET, FILM COATED ORAL at 09:03

## 2020-03-10 RX ADMIN — ASPIRIN 81 MG: 81 TABLET, COATED ORAL at 09:03

## 2020-03-10 RX ADMIN — FUROSEMIDE 80 MG: 10 INJECTION, SOLUTION INTRAMUSCULAR; INTRAVENOUS at 06:03

## 2020-03-10 RX ADMIN — FERROUS SULFATE TAB EC 325 MG (65 MG FE EQUIVALENT) 325 MG: 325 (65 FE) TABLET DELAYED RESPONSE at 09:03

## 2020-03-10 RX ADMIN — METOLAZONE 10 MG: 2.5 TABLET ORAL at 05:03

## 2020-03-10 RX ADMIN — FUROSEMIDE 30 MG/HR: 10 INJECTION, SOLUTION INTRAMUSCULAR; INTRAVENOUS at 05:03

## 2020-03-10 RX ADMIN — WARFARIN SODIUM 7.5 MG: 7.5 TABLET ORAL at 04:03

## 2020-03-10 RX ADMIN — MORPHINE SULFATE 15 MG: 15 TABLET, EXTENDED RELEASE ORAL at 09:03

## 2020-03-10 RX ADMIN — Medication 2 SPRAY: at 05:03

## 2020-03-10 NOTE — NURSING
"Pt refusing new IV for blood. Pt has 1 PIV with Lasix continuous gtt infusing to it. Reminded pt that blood and lasix cannot infuse to same IV, would need separate IVs to ensure pt gets Lasix continuously. Pt stated "they stopped the lasix for the blood the other day. I'm not getting stuck again. You're not starting no new IV on me." MD Buster informed. Stated ok to hold Lasix gtt while blood was infusing. Stated to inform when Lasix gtt to be resumed, will order IV lasix bolus and PO metolazone to be given before resuming gtt. Will follow up. Will continue to monitor.  "

## 2020-03-10 NOTE — PLAN OF CARE
Plan of care discussed with patient. Patient is free of fall/trauma/injury. Denies CP, SOB, or pain/discomfort. H/H low today, s/p 1 unit PRBCs. Lasix gtt paused d/t pt refusing new IV; when lasix restarted, administered 1x IVP bolus of 80mg. Weight gain noted this AM, metolazone x1 given today. VSS, NAD Noted. All questions addressed. Will continue to monitor

## 2020-03-10 NOTE — PROGRESS NOTES
[facility] Hospital Medicine  Progress Note    Admitting Team: IM-C  Attending Physician: Antoinette Goins MD  Date of Admit: 3/7/2020    Chief Complaint     Hand swelling (reports right hand swelling and pain x1 month.  states swelling has worsened this last week.  right hand visibly swollen)      Subjective:      History of Present Illness:  Hamlet Terrell is a 54 y.o. male with chronic combined systolic and diastolic heart failure (EF 25%), afib on coumadin, CAD, CVA, and history of PE, who was discharged from [facility] on 1/18 to home hospice on  gtt. Since discharge patient requested the  gtt be discontinued so his PICC was subsequently removed. Pt states that since PICC was removed, his RUE from hand to the elbow developed progressive swelling and pain to touch. Denies erythema, abrasions, or drainage. He reports some shortness of breath and weight gain of about 20 lbs since discharge, but that these symptoms are not bothering him. The reason for ED visit was for the arm swelling only. States he would like to continue home hospice on discharge. He is happy with the care he is receiving with home hospice.     In ED pt found to have Hb 5.2. , bilateral interstitial markings on CXR, and trop of 0.2. ECG w/ AF with PVC's. He is receiving 1 unit prbc. Also received 80 mg Lasix IV in ED with minimal output.     Interval History  Wt up to 256 lb today. Renal function improving. Pt refused coumadin yesterday, INR now 1.8. Will give 7.5 mg dosing tonight. Hb 5.7, will transfuse another unit.     Past Medical History:  Past Medical History:   Diagnosis Date    Anticoagulant long-term use     Cardiomegaly     Chronic combined systolic and diastolic congestive heart failure     Coronary artery disease     Heart attack 2006    Hypertension     Hyperthyroidism, subclinical 1/2/2013    MI (myocardial infarction) 9/22/2013    MI in 2009      Paroxysmal atrial fibrillation     PE (pulmonary  embolism) 1/1/2013    IN 2010     S/P ablation of atrial flutter 2008    Stroke 2009    no residual weaknesses       Past Surgical History:  Past Surgical History:   Procedure Laterality Date    COLONOSCOPY N/A 12/2/2019    Procedure: COLONOSCOPY;  Surgeon: Scott Rosario MD;  Location: [facility] ([address] [place] [address]);  Service: Endoscopy;  Laterality: N/A;    ESOPHAGOGASTRODUODENOSCOPY N/A 12/2/2019    Procedure: EGD (ESOPHAGOGASTRODUODENOSCOPY);  Surgeon: Scott Rosario MD;  [facility]  Service: Endoscopy;  Laterality: N/A;    RADIOFREQUENCY ABLATION  01/08/2008    for atrial flutter       Allergies:  Review of patient's allergies indicates:   Allergen Reactions    Acetaminophen      Itching    Oxycodone-acetaminophen      Other reaction(s): Itching    Ace inhibitors Other (See Comments)     cough       Home Medications:  Prior to Admission medications    Medication Sig Start Date End Date Taking? Authorizing Provider   albuterol (PROVENTIL/VENTOLIN HFA) 90 mcg/actuation inhaler Inhale 1-2 puffs into the lungs every 6 (six) hours as needed for Wheezing. Rescue 1/31/19 1/31/20  Monika Starr,    albuterol-ipratropium (DUO-NEB) 2.5 mg-0.5 mg/3 mL nebulizer solution Take 3 mLs by nebulization every 6 (six) hours as needed for Wheezing. Rescue    Historical Provider, MD   aspirin (ECOTRIN) 81 MG EC tablet Take 81 mg by mouth once daily.    Historical Provider, MD   BISACODYL RECT Place 10 mg rectally daily as needed.    Historical Provider, MD   digoxin (LANOXIN) 125 mcg tablet Take 125 mcg by mouth once daily.    Historical Provider, MD   DOBUTamine (DOBUTREX) 500 mg/250 mL (2,000 mcg/mL) Inject 322.75 mcg/min into the vein continuous. 1/17/20   Markus Howard MD   ferrous sulfate 325 (65 FE) MG EC tablet Take 1 tablet (325 mg total) by mouth once daily. 12/8/19 3/7/20  Markus Howard MD   furosemide (LASIX) 80 MG tablet Take 1 tablet (80 mg total) by mouth 2 (two) times daily.  7/17/19 7/16/20  So Tyler MD   hydrALAZINE (APRESOLINE) 25 MG tablet Take 25 mg by mouth 2 (two) times daily.    Historical Provider, MD   isosorbide dinitrate (ISORDIL) 20 MG tablet Take 20 mg by mouth 2 (two) times daily.    Historical Provider, MD   isosorbide-hydrALAZINE 20-37.5 mg (BIDIL) 20-37.5 mg Tab Take 1 tablet by mouth 2 (two) times daily.  Patient not taking: Reported on 1/6/2020 7/17/19 7/16/20  So Tyler MD   lorazepam 2 mg/ml oral conc (ATIVAN) 2 mg/mL Conc Take 2 mg by mouth every 4 (four) hours as needed.    Historical Provider, MD   methocarbamol (ROBAXIN) 500 MG Tab Take 500 mg by mouth 3 (three) times daily as needed.    Historical Provider, MD   metOLazone (ZAROXOLYN) 10 MG tablet Take 10 mg by mouth every other day.    Historical Provider, MD   morphine (MS CONTIN) 15 MG 12 hr tablet Take 15 mg by mouth 2 (two) times daily.    Historical Provider, MD   morphine 100 mg/5 mL (20 mg/mL) concentrated solution Take 10 mg by mouth every 2 (two) hours as needed for Pain.    Historical Provider, MD   nitroGLYCERIN (NITROSTAT) 0.4 MG SL tablet Place 1 tablet (0.4 mg total) under the tongue every 5 (five) minutes as needed for Chest pain. Tablet, Sublingual Sublingual 11/1/17   KAREEM Ulloa MD   ondansetron (ZOFRAN) 4 MG tablet Take 1 tablet (4 mg total) by mouth every 8 (eight) hours as needed for Nausea. 1/17/20   Markus Howard MD   oxyCODONE (ROXICODONE) 15 MG Tab Take 1 tablet (15 mg total) by mouth every 4 (four) hours as needed for Pain. 1/17/20   Markus Howard MD   polyethylene glycol (GLYCOLAX) 17 gram PwPk Take by mouth once daily.    Historical Provider, MD   potassium chloride (KLOR-CON SPRINKLE) 10 MEQ CpSR Take 10 mEq by mouth once daily.    Historical Provider, MD   senna-docusate 8.6-50 mg (PERICOLACE) 8.6-50 mg per tablet Take 1 tablet by mouth 2 (two) times daily as needed for Constipation. 1/17/20   Markus Howard MD   warfarin (COUMADIN) 1 MG tablet  Take 7 tablets (7 mg total) by mouth Daily.  Patient taking differently: Take 7 mg by mouth every evening.  12/8/19 3/7/20  Markus Howard MD       Family History:  Family History   Problem Relation Age of Onset    Hypertension Mother     Stroke Mother     Hypertension Father     Alcohol abuse Father     Hypertension Sister     Hypertension Brother        Social History:  Social History     Tobacco Use    Smoking status: Never Smoker    Smokeless tobacco: Never Used   Substance Use Topics    Alcohol use: No    Drug use: No       Review of Systems:  As per HPI  General: no fever, no chills, no weight loss, no fatigue  Nose, Sinuses: no rhinorrhea, no facial pain, no epistaxis  Cardiovascular: no chest pain, no orthopnea  Respiratory: no cough, no wheezes, no chest pain  Urinary: no dysuria, no increased frequency, no hematuria  Hematologic: no easy bruising, no overt bleeding, no petechiae  Endocrine: no heat intolerance, no cold intolerance, no polyuria  Neurologic: no seizures, no tremors, no numbness  Psychiatric: no hallucination, no depression, no suicidal ideation  Skin: no rashes, no pruritus, no pallor  All other systems reviewed & are negative.        Objective:   Last 24 Hour Vital Signs:  BP  Min: 107/53  Max: 116/52  Temp  Av.8 °F (36.6 °C)  Min: 97.3 °F (36.3 °C)  Max: 98.2 °F (36.8 °C)  Pulse  Av.8  Min: 57  Max: 102  Resp  Av.4  Min: 16  Max: 20  SpO2  Av.3 %  Min: 94 %  Max: 98 %  Weight  Av.9 kg (257 lb 11.5 oz)  Min: 116.9 kg (257 lb 11.5 oz)  Max: 116.9 kg (257 lb 11.5 oz)  Body mass index is 38.06 kg/m².  I/O last 3 completed shifts:  In: 2794.5 [P.O.:2280; I.V.:208.3; Blood:306.3]  Out: 3835 [Urine:3835]    Physical Examination:  GEN: AAOx3, NAD  HEENT: NCAT, MMM, PERRL, EOMI, oropharynx clear  CV: RRR, no m/r, unable to assess jvp due to body habitus  RESP: CTAB, no wheezes/crackles  ABD: soft, NTND, normoactive BS, no organomegaly  EXTR: no c/c, 2+ distal  pulses x 4, b/l pitting edema BLE with chronic venous stasis changes, R hand swelling improved, no erythema/induration/fluctuance noted  NEURO: PERRL, EOMI, 5/5 motor strength all four extremities, intact sensation to light touch, no focal deficits  SKIN: no rashes, lesions, or color changes  PSYCH: normal affect     Laboratory:  I have reviewed all pertinent laboratory data within the past 24 hours.    Other Results:  EKG (my interpretation): AF with PVCs    Radiology:  I have reviewed all pertinent radiology imaging within the past 24 hours.    Prior records reviewed.     Assessment/Plan:     Hamlet Terrell is a 54 y.o. male with:    Acute on Chronic Combined Systolic and Diastolic Heart Failure  Cardiomyopathy  Patient with advanced heart failure and refuses discussion for advanced options with HTS. Patient without proper response to diuretics and has proven to be  dependent. Patient with progressive renal dysfunction yet refuses outpatient HD, making any attempts at inpatient HD temporizing given the severity of his cardiac dysfunction.   · . Trop 0.2, likely demand ischemia, trend. CXR with pulmonary edema. Dry weight in 240's. Pt reports weight of 260's at home.  ·  gtt discontinued by patient after discharge to home hospice  · Strict I&Os with daily weights  · Cont home Bidil  · No ACEI/ARB due to renal function  · Cont home qother day metolazone  · Minimal output with IV Lasix 80 mg in ED. Doing well with Lasix 30 mg/hr gtt with metolazone augmentation.      RUE swelling  - US ordered -  Negative for DVT  - Elevate & supportive care    JEAN-PIERRE on CKD3  · Cr 2.7 on discharge in January. 4.5 on presentation  · Cardiorenal   · Received temporary HD during last admission. Per last discharge, no indication at present time for HD as treatments would be only temporizing and goal is to maximize patient's time at home/quality of life  · Monitor w/ diuresis - improving     Iron Def Anemia  - Hb 5.2 on  presentation. Baseline 6.5. S/p 2 units. Hb 5.7 today, will give another unit today  - Denies bleeding. Likely dilutional in setting of fluid overload  - Monitor  - Cont Fe    Chronic AFib  Long Term Use of Anticoagulants  Supratherapeutic INR  · Chronic issue  · Rate controlled  · INR 3.3 > 2.7 > 2.2 > 1.8. Resume coumadin at reduced dose 6 mg after 7.5 mg dose today. Daily INR's     Essential HTN  · Chronic and stable  · Continue Bidil BID     CAD  History of MI  · Chronic and stable  · Continue Aspirin 81mg PO daily  · Continue Bidil BID    History of VTE  History of Stroke  · Chronic and stable  · Continue Aspirin 81mg PO daily  · Continue Warfarin       Diet- cardiac, 1.2 L  PPX- coumadin  Dispo- pending diuresis; will return to home hospice        Antoinette Goins MD  [facility] Medicine Staff  [facility]

## 2020-03-10 NOTE — PLAN OF CARE
Patient remained free from injuries, falls, and trauma. SR/ST on tele. All other vital signs stable. One time dose of Afrin ordered for persistent nose bleeds. Plan of care reviewed with pt, understanding verbalized. Will continue to monitor.

## 2020-03-10 NOTE — NURSING
Critical H/H resulted this AM of 5.7/19.6. MD Buster notified. Ordered 1 unit PRBC. Will implement. Will continue to monitor.

## 2020-03-10 NOTE — PROGRESS NOTES
Pt complained of nosebleed. Pressure held. Pt placed tissues in his nose, no relief obtained. MD Medrano notified. Will place order for one time dose of Afrin. Will continue to monitor.

## 2020-03-11 VITALS
DIASTOLIC BLOOD PRESSURE: 52 MMHG | OXYGEN SATURATION: 97 % | RESPIRATION RATE: 18 BRPM | HEART RATE: 79 BPM | SYSTOLIC BLOOD PRESSURE: 119 MMHG | TEMPERATURE: 97 F | BODY MASS INDEX: 37.68 KG/M2 | HEIGHT: 69 IN | WEIGHT: 254.44 LBS

## 2020-03-11 LAB
ABO + RH BLD: NORMAL
ANION GAP SERPL CALC-SCNC: 17 MMOL/L (ref 8–16)
BASOPHILS # BLD AUTO: 0.05 K/UL (ref 0–0.2)
BASOPHILS NFR BLD: 0.7 % (ref 0–1.9)
BLD GP AB SCN CELLS X3 SERPL QL: NORMAL
BUN SERPL-MCNC: 141 MG/DL (ref 6–20)
CALCIUM SERPL-MCNC: 9.9 MG/DL (ref 8.7–10.5)
CHLORIDE SERPL-SCNC: 90 MMOL/L (ref 95–110)
CO2 SERPL-SCNC: 34 MMOL/L (ref 23–29)
CREAT SERPL-MCNC: 2.6 MG/DL (ref 0.5–1.4)
DIFFERENTIAL METHOD: ABNORMAL
DIGOXIN SERPL-MCNC: 0.7 NG/ML (ref 0.8–2)
EOSINOPHIL # BLD AUTO: 0.2 K/UL (ref 0–0.5)
EOSINOPHIL NFR BLD: 3.2 % (ref 0–8)
ERYTHROCYTE [DISTWIDTH] IN BLOOD BY AUTOMATED COUNT: 18.8 % (ref 11.5–14.5)
EST. GFR  (AFRICAN AMERICAN): 31.2 ML/MIN/1.73 M^2
EST. GFR  (NON AFRICAN AMERICAN): 26.9 ML/MIN/1.73 M^2
GLUCOSE SERPL-MCNC: 169 MG/DL (ref 70–110)
HCT VFR BLD AUTO: 21.5 % (ref 40–54)
HGB BLD-MCNC: 6.2 G/DL (ref 14–18)
IMM GRANULOCYTES # BLD AUTO: 0.12 K/UL (ref 0–0.04)
IMM GRANULOCYTES NFR BLD AUTO: 1.6 % (ref 0–0.5)
INR PPP: 1.7 (ref 0.8–1.2)
LYMPHOCYTES # BLD AUTO: 0.7 K/UL (ref 1–4.8)
LYMPHOCYTES NFR BLD: 9.6 % (ref 18–48)
MAGNESIUM SERPL-MCNC: 2.3 MG/DL (ref 1.6–2.6)
MCH RBC QN AUTO: 25.4 PG (ref 27–31)
MCHC RBC AUTO-ENTMCNC: 28.8 G/DL (ref 32–36)
MCV RBC AUTO: 88 FL (ref 82–98)
MONOCYTES # BLD AUTO: 1.3 K/UL (ref 0.3–1)
MONOCYTES NFR BLD: 16.4 % (ref 4–15)
NEUTROPHILS # BLD AUTO: 5.2 K/UL (ref 1.8–7.7)
NEUTROPHILS NFR BLD: 68.5 % (ref 38–73)
NRBC BLD-RTO: 0 /100 WBC
PLATELET # BLD AUTO: 301 K/UL (ref 150–350)
PMV BLD AUTO: 10.1 FL (ref 9.2–12.9)
POTASSIUM SERPL-SCNC: 3.3 MMOL/L (ref 3.5–5.1)
PROTHROMBIN TIME: 16.6 SEC (ref 9–12.5)
RBC # BLD AUTO: 2.44 M/UL (ref 4.6–6.2)
SODIUM SERPL-SCNC: 141 MMOL/L (ref 136–145)
WBC # BLD AUTO: 7.61 K/UL (ref 3.9–12.7)

## 2020-03-11 PROCEDURE — 36415 COLL VENOUS BLD VENIPUNCTURE: CPT

## 2020-03-11 PROCEDURE — 25000003 PHARM REV CODE 250: Performed by: HOSPITALIST

## 2020-03-11 PROCEDURE — 80162 ASSAY OF DIGOXIN TOTAL: CPT

## 2020-03-11 PROCEDURE — 99238 HOSP IP/OBS DSCHRG MGMT 30/<: CPT | Mod: ,,, | Performed by: HOSPITALIST

## 2020-03-11 PROCEDURE — 63600175 PHARM REV CODE 636 W HCPCS: Performed by: HOSPITALIST

## 2020-03-11 PROCEDURE — 86850 RBC ANTIBODY SCREEN: CPT

## 2020-03-11 PROCEDURE — 85610 PROTHROMBIN TIME: CPT

## 2020-03-11 PROCEDURE — 80048 BASIC METABOLIC PNL TOTAL CA: CPT

## 2020-03-11 PROCEDURE — 99238 PR HOSPITAL DISCHARGE DAY,<30 MIN: ICD-10-PCS | Mod: ,,, | Performed by: HOSPITALIST

## 2020-03-11 PROCEDURE — 83735 ASSAY OF MAGNESIUM: CPT

## 2020-03-11 PROCEDURE — 85025 COMPLETE CBC W/AUTO DIFF WBC: CPT

## 2020-03-11 RX ORDER — HYDRALAZINE HYDROCHLORIDE 25 MG/1
25 TABLET, FILM COATED ORAL 3 TIMES DAILY
Qty: 90 TABLET | Refills: 11 | Status: SHIPPED | OUTPATIENT
Start: 2020-03-11

## 2020-03-11 RX ORDER — POTASSIUM CHLORIDE 20 MEQ/15ML
20 SOLUTION ORAL
Status: DISPENSED | OUTPATIENT
Start: 2020-03-11 | End: 2020-03-11

## 2020-03-11 RX ORDER — ISOSORBIDE DINITRATE 20 MG/1
20 TABLET ORAL 3 TIMES DAILY
Qty: 90 TABLET | Refills: 11 | Status: SHIPPED | OUTPATIENT
Start: 2020-03-11

## 2020-03-11 RX ADMIN — FUROSEMIDE 30 MG/HR: 10 INJECTION, SOLUTION INTRAMUSCULAR; INTRAVENOUS at 06:03

## 2020-03-11 RX ADMIN — HYDRALAZINE HYDROCHLORIDE 25 MG: 25 TABLET, FILM COATED ORAL at 08:03

## 2020-03-11 RX ADMIN — POTASSIUM CHLORIDE 20 MEQ: 20 SOLUTION ORAL at 02:03

## 2020-03-11 RX ADMIN — ASPIRIN 81 MG: 81 TABLET, COATED ORAL at 08:03

## 2020-03-11 RX ADMIN — POTASSIUM CHLORIDE 20 MEQ: 20 SOLUTION ORAL at 01:03

## 2020-03-11 RX ADMIN — ISOSORBIDE DINITRATE 20 MG: 20 TABLET ORAL at 08:03

## 2020-03-11 RX ADMIN — FERROUS SULFATE TAB EC 325 MG (65 MG FE EQUIVALENT) 325 MG: 325 (65 FE) TABLET DELAYED RESPONSE at 08:03

## 2020-03-11 RX ADMIN — POTASSIUM CHLORIDE 20 MEQ: 20 SOLUTION ORAL at 11:03

## 2020-03-11 RX ADMIN — DIGOXIN 125 MCG: 125 TABLET ORAL at 08:03

## 2020-03-11 RX ADMIN — FUROSEMIDE 30 MG/HR: 10 INJECTION, SOLUTION INTRAMUSCULAR; INTRAVENOUS at 12:03

## 2020-03-11 RX ADMIN — WARFARIN SODIUM 6 MG: 3 TABLET ORAL at 05:03

## 2020-03-11 NOTE — HOSPITAL COURSE
"Acute on Chronic Combined Systolic and Diastolic Heart Failure  Cardiomyopathy  Per last dc summary:  "Patient with advanced heart failure and refuses discussion for advanced options with HTS. Patient without proper response to diuretics and has proven to be  dependent. Patient with progressive renal dysfunction yet refuses outpatient HD, making any attempts at inpatient HD temporizing given the severity of his cardiac dysfunction."  · . Trop 0.2, likely demand ischemia, flat. CXR with pulmonary edema. Dry weight in 240's-250's. Pt reports weight of 260's at home.  ·  gtt discontinued by patient after discharge to home hospice; has no interest in continuing  · Daily weights  · Cont home Bidil - increased to TID  · No ACEI/ARB due to renal function  · Cont home qother day metolazone  · Minimal output with IV Lasix 80 mg in ED. Diuresed well with Lasix 30 mg/hr gtt with metolazone augmentation.   · Change home lasix to torsemide for better bioavailability  · Patient to resume home hospice on discharge     RUE swelling  - US ordered -  Negative for DVT  - No evidence of infection. Resolved with elevation and warm compresses     JEAN-PIERRE on CKD3  · Cr 2.7 on discharge in January. 4.5 on presentation  · Cardiorenal   · Received temporary HD during last admission. Per last discharge, no indication at present time for HD as treatments would be only temporizing and goal is to maximize patient's time at home/quality of life  · Returned to baseline w/ diuresis     Iron Def Anemia  - Hb 5.2 on presentation. Baseline 6.5. S/p 3 units with return to baseline  - Denies bleeding. Likely dilutional in setting of fluid overload vs renal disease  - Cont Fe     Chronic AFib  Long Term Use of Anticoagulants  Supratherapeutic INR  · Chronic issue  · Rate controlled  · INR 3.3 > 2.7 > 2.2 > 1.8 > 1.7. Resume coumadin at reduced dose 6 mg per PharmD. Weekly INRs with hospice     Essential HTN  · Chronic and stable  · Continue " Bidil at increased TID dosing     CAD  History of MI  · Chronic and stable  · Continue Aspirin 81mg PO daily  · Continue Bidil TID     History of VTE  History of Stroke  · Chronic and stable  · Continue Aspirin 81mg PO daily  · Continue Warfarin

## 2020-03-11 NOTE — PLAN OF CARE
POC reviewed with pt at 0300. Pt verbalized understanding. Questions and concerns addressed. No acute events overnight. Pt progressing toward goals.  PT refused all meds overnight.  Pt on lasix gtt overnight. Will continue to monitor. See flowsheets for full assessment and VS info

## 2020-03-11 NOTE — PROGRESS NOTES
Wound care-  Pt planning d/c to hospice today. Skin intact. Lipodermatosclerosis appearance to BLE. Recommend BID moisturizer and elevation of BLE.  Notified nursing.  [id number]

## 2020-03-11 NOTE — DISCHARGE SUMMARY
"[facility] Hospital Medicine  Discharge Summary      Patient Name: Hamlet Terrell  MRN: [id number]  Admission Date: 3/7/2020  Hospital Length of Stay: 4 days  Discharge Date and Time:  03/11/2020 10:52 AM  Attending Physician: Antoinette Goins MD   Discharging Provider: Antoinette Goins MD  Primary Care Provider: Carlin Swift MD  [facility] Medicine Team: [facility] [place] HOSP MED  Antoinette Goins MD    HPI:   Hamlet Terrell is a 54 y.o. male with chronic combined systolic and diastolic heart failure (EF 25%), afib on coumadin, CAD, CVA, and history of PE, who was discharged from [facility] [place] on 1/18 to home hospice on  gtt. Since discharge patient requested the  gtt be discontinued so his PICC was subsequently removed. Pt states that since PICC was removed, his RUE from hand to the elbow developed progressive swelling and pain to touch. Denies erythema, abrasions, or drainage. He reports some shortness of breath and weight gain of about 20 lbs since discharge, but that these symptoms are not bothering him. The reason for ED visit was for the arm swelling only. States he would like to continue home hospice on discharge. He is happy with the care he is receiving with home hospice.      In ED pt found to have Hb 5.2. , bilateral interstitial markings on CXR, and trop of 0.2. ECG w/ AF with PVC's. He is receiving 1 unit prbc. Also received 80 mg Lasix IV in ED with minimal output.        * No surgery found *      Hospital Course:   Acute on Chronic Combined Systolic and Diastolic Heart Failure  Cardiomyopathy  Per last dc summary:  "Patient with advanced heart failure and refuses discussion for advanced options with HTS. Patient without proper response to diuretics and has proven to be  dependent. Patient with progressive renal dysfunction yet refuses outpatient HD, making any attempts at inpatient HD temporizing given the severity of his cardiac dysfunction."  · . Trop 0.2, likely demand ischemia, flat. " CXR with pulmonary edema. Dry weight in 240's-250's. Pt reports weight of 260's at home.  ·  gtt discontinued by patient after discharge to home hospice; has no interest in continuing  · Daily weights  · Cont home Bidil - increased to TID  · No ACEI/ARB due to renal function  · Cont home qother day metolazone  · Minimal output with IV Lasix 80 mg in ED. Diuresed well with Lasix 30 mg/hr gtt with metolazone augmentation.   · Change home lasix to torsemide for better bioavailability  · Patient to resume home hospice on discharge     RUE swelling  - US ordered -  Negative for DVT  - No evidence of infection. Resolved with elevation and warm compresses     JEAN-PIERRE on CKD3  · Cr 2.7 on discharge in January. 4.5 on presentation  · Cardiorenal   · Received temporary HD during last admission. Per last discharge, no indication at present time for HD as treatments would be only temporizing and goal is to maximize patient's time at home/quality of life  · Returned to baseline w/ diuresis     Iron Def Anemia  - Hb 5.2 on presentation. Baseline 6.5. S/p 3 units with return to baseline  - Denies bleeding. Likely dilutional in setting of fluid overload vs renal disease  - Cont Fe     Chronic AFib  Long Term Use of Anticoagulants  Supratherapeutic INR  · Chronic issue  · Rate controlled  · INR 3.3 > 2.7 > 2.2 > 1.8 > 1.7. Resume coumadin at reduced dose 6 mg per PharmD. Weekly INRs with hospice     Essential HTN  · Chronic and stable  · Continue Bidil at increased TID dosing     CAD  History of MI  · Chronic and stable  · Continue Aspirin 81mg PO daily  · Continue Bidil TID     History of VTE  History of Stroke  · Chronic and stable  · Continue Aspirin 81mg PO daily  Continue Warfarin      Consults:   Consults (From admission, onward)        Status Ordering Provider     Inpatient consult to Registered Dietitian/Nutritionist  Once     Provider:  (Not yet assigned)    Completed DARSHAN YANG new Assessment & Plan notes  have been filed under this hospital service since the last note was generated.  Service: Hospital Medicine    Final Active Diagnoses:    Diagnosis Date Noted POA    PRINCIPAL PROBLEM:  Acute on chronic combined systolic and diastolic heart failure [I50.43] 04/22/2019 Yes    Swelling of right upper extremity [M79.89] 03/07/2020 Yes    Acute kidney injury [N17.9] 06/03/2016 Yes    Stage 3 chronic kidney disease [N18.3] 05/31/2016 Yes    Atrial fibrillation, chronic [I48.20] 01/02/2013 Yes     Chronic      Problems Resolved During this Admission:       Discharged Condition: good    Disposition: Hospice/Home    Follow Up:    Patient Instructions:      Notify your health care provider if you experience any of the following:  persistent dizziness, light-headedness, or visual disturbances     Notify your health care provider if you experience any of the following:  difficulty breathing or increased cough     Notify your health care provider if you experience any of the following:  severe uncontrolled pain     Activity as tolerated       Significant Diagnostic Studies: Labs:   CMP   Recent Labs   Lab 03/10/20  0614 03/11/20  0602    141   K 3.7 3.3*   CL 90* 90*   CO2 35* 34*   * 169*   * 141*   CREATININE 2.8* 2.6*   CALCIUM 9.5 9.9   ANIONGAP 14 17*   ESTGFRAFRICA 28.5* 31.2*   EGFRNONAA 24.6* 26.9*    and CBC   Recent Labs   Lab 03/10/20  0614 03/11/20  0602   WBC 7.21 7.61   HGB 5.7* 6.2*   HCT 19.6* 21.5*    301       Pending Diagnostic Studies:     None         Medications:  Reconciled Home Medications:      Medication List      START taking these medications    ferrous sulfate 325 mg (65 mg iron) Tab tablet  Commonly known as:  FEOSOL  Take 1 tablet (325 mg total) by mouth once daily.  Replaces:  ferrous sulfate 325 (65 FE) MG EC tablet     torsemide 20 MG Tab  Commonly known as:  DEMADEX  Take 2 tablets (40 mg total) by mouth 2 (two) times daily.        CHANGE how you take these  medications    hydrALAZINE 25 MG tablet  Commonly known as:  APRESOLINE  Take 1 tablet (25 mg total) by mouth 3 (three) times daily.  What changed:  when to take this     isosorbide dinitrate 20 MG tablet  Commonly known as:  ISORDIL  Take 1 tablet (20 mg total) by mouth 3 (three) times daily.  What changed:  when to take this     warfarin 6 MG tablet  Commonly known as:  COUMADIN  Take 1 tablet (6 mg total) by mouth Daily.  What changed:    · medication strength  · how much to take        CONTINUE taking these medications    albuterol 90 mcg/actuation inhaler  Commonly known as:  PROVENTIL/VENTOLIN HFA  Inhale 1-2 puffs into the lungs every 6 (six) hours as needed for Wheezing. Rescue     albuterol-ipratropium 2.5 mg-0.5 mg/3 mL nebulizer solution  Commonly known as:  DUO-NEB  Take 3 mLs by nebulization every 6 (six) hours as needed for Wheezing. Rescue     aspirin 81 MG EC tablet  Commonly known as:  ECOTRIN  Take 81 mg by mouth once daily.     BISACODYL RECT  Place 10 mg rectally daily as needed.     digoxin 125 mcg tablet  Commonly known as:  LANOXIN  Take 125 mcg by mouth once daily.     KLOR-CON SPRINKLE 10 MEQ Cpsr  Generic drug:  potassium chloride  Take 10 mEq by mouth once daily.     lorazepam 2 mg/ml oral conc 2 mg/mL Conc  Commonly known as:  ATIVAN  Take 2 mg by mouth every 4 (four) hours as needed.     methocarbamoL 500 MG Tab  Commonly known as:  ROBAXIN  Take 500 mg by mouth 3 (three) times daily as needed.     metOLazone 10 MG tablet  Commonly known as:  ZAROXOLYN  Take 10 mg by mouth every other day.     * morphine 100 mg/5 mL (20 mg/mL) concentrated solution  Take 10 mg by mouth every 2 (two) hours as needed for Pain.     * morphine 15 MG 12 hr tablet  Commonly known as:  MS CONTIN  Take 15 mg by mouth 2 (two) times daily.     nitroGLYCERIN 0.4 MG SL tablet  Commonly known as:  NITROSTAT  Place 1 tablet (0.4 mg total) under the tongue every 5 (five) minutes as needed for Chest pain. Tablet,  Sublingual Sublingual     ondansetron 4 MG tablet  Commonly known as:  ZOFRAN  Take 1 tablet (4 mg total) by mouth every 8 (eight) hours as needed for Nausea.     oxyCODONE 15 MG Tab  Commonly known as:  ROXICODONE  Take 1 tablet (15 mg total) by mouth every 4 (four) hours as needed for Pain.     polyethylene glycol 17 gram Pwpk  Commonly known as:  GLYCOLAX  Take by mouth once daily.     senna-docusate 8.6-50 mg 8.6-50 mg per tablet  Commonly known as:  PERICOLACE  Take 1 tablet by mouth 2 (two) times daily as needed for Constipation.         * This list has 2 medication(s) that are the same as other medications prescribed for you. Read the directions carefully, and ask your doctor or other care provider to review them with you.            STOP taking these medications    DOBUTamine 500 mg/250 mL (2,000 mcg/mL)  Commonly known as:  DOBUTREX     ferrous sulfate 325 (65 FE) MG EC tablet  Replaced by:  ferrous sulfate 325 mg (65 mg iron) Tab tablet     furosemide 80 MG tablet  Commonly known as:  LASIX     isosorbide-hydrALAZINE 20-37.5 mg 20-37.5 mg Tab  Commonly known as:  BIDIL            Indwelling Lines/Drains at time of discharge:   Lines/Drains/Airways     None                 Time spent on the discharge of patient: 29 minutes  Patient was seen and examined on the date of discharge and determined to be suitable for discharge.         Antoinette Goins MD  Department of Hospital Medicine  [facility]

## 2020-03-11 NOTE — NURSING
Pt discharge pending. Pt states that he is awaiting family members to get off work so that he may have a ride home. RN to notify [facility] when pt leaves facility as home hospice will meet pt at his house when he arrives.

## 2020-03-11 NOTE — HPI
Hamlet Terrell is a 54 y.o. male with chronic combined systolic and diastolic heart failure (EF 25%), afib on coumadin, CAD, CVA, and history of PE, who was discharged from [facility] [place] on 1/18 to home hospice on  gtt. Since discharge patient requested the  gtt be discontinued so his PICC was subsequently removed. Pt states that since PICC was removed, his RUE from hand to the elbow developed progressive swelling and pain to touch. Denies erythema, abrasions, or drainage. He reports some shortness of breath and weight gain of about 20 lbs since discharge, but that these symptoms are not bothering him. The reason for ED visit was for the arm swelling only. States he would like to continue home hospice on discharge. He is happy with the care he is receiving with home hospice.      In ED pt found to have Hb 5.2. , bilateral interstitial markings on CXR, and trop of 0.2. ECG w/ AF with PVC's. He is receiving 1 unit prbc. Also received 80 mg Lasix IV in ED with minimal output.

## 2020-03-12 NOTE — PLAN OF CARE
03/12/20 0650   Final Note   Assessment Type Final Discharge Note   Anticipated Discharge Disposition HospiceHome      [General Appearance - Well Developed] : well developed [Normal Appearance] : normal appearance [Well Groomed] : well groomed [General Appearance - Well Nourished] : well nourished [No Deformities] : no deformities [General Appearance - In No Acute Distress] : no acute distress [Normal Conjunctiva] : the conjunctiva exhibited no abnormalities [Eyelids - No Xanthelasma] : the eyelids demonstrated no xanthelasmas [Normal Oral Mucosa] : normal oral mucosa [No Oral Pallor] : no oral pallor [No Oral Cyanosis] : no oral cyanosis [Normal Jugular Venous A Waves Present] : normal jugular venous A waves present [Normal Jugular Venous V Waves Present] : normal jugular venous V waves present [No Jugular Venous Turner A Waves] : no jugular venous turner A waves [Respiration, Rhythm And Depth] : normal respiratory rhythm and effort [Exaggerated Use Of Accessory Muscles For Inspiration] : no accessory muscle use [Auscultation Breath Sounds / Voice Sounds] : lungs were clear to auscultation bilaterally [5th Left ICS - MCL] : palpated at the 5th LICS in the midclavicular line [Normal] : normal [Normal Rate] : normal [Premature Beats] : regular with premature beats [Normal S1] : normal S1 [Normal S2] : normal S2 [No Gallop] : no gallop heard [No Murmur] : no murmurs heard [2+] : left 2+ [No Abnormalities] : the abdominal aorta was not enlarged and no bruit was heard [No Pitting Edema] : no pitting edema present [Abdomen Soft] : soft [Abdomen Tenderness] : non-tender [Abdomen Mass (___ Cm)] : no abdominal mass palpated [Abnormal Walk] : normal gait [Gait - Sufficient For Exercise Testing] : the gait was sufficient for exercise testing [Nail Clubbing] : no clubbing of the fingernails [Cyanosis, Localized] : no localized cyanosis [Petechial Hemorrhages (___cm)] : no petechial hemorrhages [Skin Color & Pigmentation] : normal skin color and pigmentation [] : no rash [No Venous Stasis] : no venous stasis [Skin Lesions] : no skin lesions [No Skin Ulcers] : no skin ulcer [No Xanthoma] : no  xanthoma was observed [Oriented To Time, Place, And Person] : oriented to person, place, and time [Affect] : the affect was normal [Mood] : the mood was normal [No Anxiety] : not feeling anxious [Click] : no click [Right Carotid Bruit] : no bruit heard over the right carotid [Left Carotid Bruit] : no bruit heard over the left carotid

## 2020-05-13 ENCOUNTER — HOSPITAL ENCOUNTER (INPATIENT)
Facility: HOSPITAL | Age: 55
LOS: 2 days | Discharge: HOSPICE | DRG: 291 | End: 2020-05-15
Attending: EMERGENCY MEDICINE | Admitting: EMERGENCY MEDICINE
Payer: MEDICAID

## 2020-05-13 DIAGNOSIS — I50.43 ACUTE ON CHRONIC COMBINED SYSTOLIC AND DIASTOLIC CONGESTIVE HEART FAILURE: ICD-10-CM

## 2020-05-13 DIAGNOSIS — I50.43 ACUTE ON CHRONIC COMBINED SYSTOLIC AND DIASTOLIC HEART FAILURE: ICD-10-CM

## 2020-05-13 DIAGNOSIS — D64.9 ANEMIA, UNSPECIFIED TYPE: ICD-10-CM

## 2020-05-13 DIAGNOSIS — E87.70 HYPERVOLEMIA, UNSPECIFIED HYPERVOLEMIA TYPE: Primary | ICD-10-CM

## 2020-05-13 DIAGNOSIS — R53.1 WEAKNESS: ICD-10-CM

## 2020-05-13 LAB
ALBUMIN SERPL BCP-MCNC: 2.4 G/DL (ref 3.5–5.2)
ALP SERPL-CCNC: 280 U/L (ref 55–135)
ALT SERPL W/O P-5'-P-CCNC: 22 U/L (ref 10–44)
ANION GAP SERPL CALC-SCNC: 13 MMOL/L (ref 8–16)
AST SERPL-CCNC: 35 U/L (ref 10–40)
BASOPHILS # BLD AUTO: 0.04 K/UL (ref 0–0.2)
BASOPHILS NFR BLD: 0.4 % (ref 0–1.9)
BILIRUB SERPL-MCNC: 1.7 MG/DL (ref 0.1–1)
BNP SERPL-MCNC: 334 PG/ML (ref 0–99)
BUN SERPL-MCNC: 91 MG/DL (ref 6–20)
CALCIUM SERPL-MCNC: 9.1 MG/DL (ref 8.7–10.5)
CHLORIDE SERPL-SCNC: 100 MMOL/L (ref 95–110)
CO2 SERPL-SCNC: 24 MMOL/L (ref 23–29)
CREAT SERPL-MCNC: 2.1 MG/DL (ref 0.5–1.4)
DIFFERENTIAL METHOD: ABNORMAL
DIGOXIN SERPL-MCNC: <0.1 NG/ML (ref 0.8–2)
EOSINOPHIL # BLD AUTO: 0.3 K/UL (ref 0–0.5)
EOSINOPHIL NFR BLD: 3.4 % (ref 0–8)
ERYTHROCYTE [DISTWIDTH] IN BLOOD BY AUTOMATED COUNT: 23 % (ref 11.5–14.5)
EST. GFR  (AFRICAN AMERICAN): 40.3 ML/MIN/1.73 M^2
EST. GFR  (NON AFRICAN AMERICAN): 34.9 ML/MIN/1.73 M^2
GLUCOSE SERPL-MCNC: 162 MG/DL (ref 70–110)
HCT VFR BLD AUTO: 23.2 % (ref 40–54)
HGB BLD-MCNC: 6.7 G/DL (ref 14–18)
IMM GRANULOCYTES # BLD AUTO: 0.15 K/UL (ref 0–0.04)
IMM GRANULOCYTES NFR BLD AUTO: 1.6 % (ref 0–0.5)
INR PPP: 1.1 (ref 0.8–1.2)
LYMPHOCYTES # BLD AUTO: 0.8 K/UL (ref 1–4.8)
LYMPHOCYTES NFR BLD: 8.9 % (ref 18–48)
MAGNESIUM SERPL-MCNC: 1.9 MG/DL (ref 1.6–2.6)
MCH RBC QN AUTO: 26.2 PG (ref 27–31)
MCHC RBC AUTO-ENTMCNC: 28.9 G/DL (ref 32–36)
MCV RBC AUTO: 91 FL (ref 82–98)
MONOCYTES # BLD AUTO: 2 K/UL (ref 0.3–1)
MONOCYTES NFR BLD: 21.6 % (ref 4–15)
NEUTROPHILS # BLD AUTO: 5.9 K/UL (ref 1.8–7.7)
NEUTROPHILS NFR BLD: 64.1 % (ref 38–73)
NRBC BLD-RTO: 0 /100 WBC
PLATELET # BLD AUTO: 248 K/UL (ref 150–350)
PMV BLD AUTO: 10.1 FL (ref 9.2–12.9)
POTASSIUM SERPL-SCNC: 4.7 MMOL/L (ref 3.5–5.1)
PROT SERPL-MCNC: 7.8 G/DL (ref 6–8.4)
PROTHROMBIN TIME: 11.4 SEC (ref 9–12.5)
RBC # BLD AUTO: 2.56 M/UL (ref 4.6–6.2)
SARS-COV-2 RDRP RESP QL NAA+PROBE: NEGATIVE
SODIUM SERPL-SCNC: 137 MMOL/L (ref 136–145)
TROPONIN I SERPL DL<=0.01 NG/ML-MCNC: 0.15 NG/ML (ref 0–0.03)
WBC # BLD AUTO: 9.18 K/UL (ref 3.9–12.7)

## 2020-05-13 PROCEDURE — 84484 ASSAY OF TROPONIN QUANT: CPT

## 2020-05-13 PROCEDURE — 12000002 HC ACUTE/MED SURGE SEMI-PRIVATE ROOM

## 2020-05-13 PROCEDURE — 99291 PR CRITICAL CARE, E/M 30-74 MINUTES: ICD-10-PCS | Mod: GW,,, | Performed by: EMERGENCY MEDICINE

## 2020-05-13 PROCEDURE — 83735 ASSAY OF MAGNESIUM: CPT

## 2020-05-13 PROCEDURE — 99285 EMERGENCY DEPT VISIT HI MDM: CPT | Mod: 25

## 2020-05-13 PROCEDURE — 83880 ASSAY OF NATRIURETIC PEPTIDE: CPT

## 2020-05-13 PROCEDURE — 93005 ELECTROCARDIOGRAM TRACING: CPT

## 2020-05-13 PROCEDURE — U0002 COVID-19 LAB TEST NON-CDC: HCPCS

## 2020-05-13 PROCEDURE — 63600175 PHARM REV CODE 636 W HCPCS: Performed by: STUDENT IN AN ORGANIZED HEALTH CARE EDUCATION/TRAINING PROGRAM

## 2020-05-13 PROCEDURE — 80053 COMPREHEN METABOLIC PANEL: CPT

## 2020-05-13 PROCEDURE — 99291 CRITICAL CARE FIRST HOUR: CPT | Mod: GW,,, | Performed by: EMERGENCY MEDICINE

## 2020-05-13 PROCEDURE — 85025 COMPLETE CBC W/AUTO DIFF WBC: CPT

## 2020-05-13 PROCEDURE — 96374 THER/PROPH/DIAG INJ IV PUSH: CPT

## 2020-05-13 PROCEDURE — 80047 BASIC METABLC PNL IONIZED CA: CPT

## 2020-05-13 PROCEDURE — 93010 EKG 12-LEAD: ICD-10-PCS | Mod: ,,, | Performed by: INTERNAL MEDICINE

## 2020-05-13 PROCEDURE — 80162 ASSAY OF DIGOXIN TOTAL: CPT

## 2020-05-13 PROCEDURE — 93010 ELECTROCARDIOGRAM REPORT: CPT | Mod: ,,, | Performed by: INTERNAL MEDICINE

## 2020-05-13 PROCEDURE — 85610 PROTHROMBIN TIME: CPT

## 2020-05-13 RX ORDER — FUROSEMIDE 10 MG/ML
80 INJECTION INTRAMUSCULAR; INTRAVENOUS
Status: COMPLETED | OUTPATIENT
Start: 2020-05-13 | End: 2020-05-13

## 2020-05-13 RX ORDER — SODIUM CHLORIDE 0.9 % (FLUSH) 0.9 %
10 SYRINGE (ML) INJECTION
Status: DISCONTINUED | OUTPATIENT
Start: 2020-05-13 | End: 2020-05-15 | Stop reason: HOSPADM

## 2020-05-13 RX ADMIN — FUROSEMIDE 80 MG: 10 INJECTION, SOLUTION INTRAMUSCULAR; INTRAVENOUS at 09:05

## 2020-05-14 LAB
ABO + RH BLD: NORMAL
ALBUMIN SERPL BCP-MCNC: 2.4 G/DL (ref 3.5–5.2)
ALP SERPL-CCNC: 284 U/L (ref 55–135)
ALT SERPL W/O P-5'-P-CCNC: 19 U/L (ref 10–44)
ANION GAP SERPL CALC-SCNC: 13 MMOL/L (ref 8–16)
APTT BLDCRRT: 24.9 SEC (ref 21–32)
APTT BLDCRRT: 25.2 SEC (ref 21–32)
AST SERPL-CCNC: 32 U/L (ref 10–40)
BASOPHILS # BLD AUTO: 0.03 K/UL (ref 0–0.2)
BASOPHILS # BLD AUTO: 0.03 K/UL (ref 0–0.2)
BASOPHILS NFR BLD: 0.4 % (ref 0–1.9)
BASOPHILS NFR BLD: 0.4 % (ref 0–1.9)
BILIRUB SERPL-MCNC: 1.5 MG/DL (ref 0.1–1)
BILIRUB UR QL STRIP: NEGATIVE
BLD GP AB SCN CELLS X3 SERPL QL: NORMAL
BUN SERPL-MCNC: 89 MG/DL (ref 6–20)
CALCIUM SERPL-MCNC: 8.8 MG/DL (ref 8.7–10.5)
CHLORIDE SERPL-SCNC: 100 MMOL/L (ref 95–110)
CLARITY UR REFRACT.AUTO: CLEAR
CO2 SERPL-SCNC: 24 MMOL/L (ref 23–29)
COLOR UR AUTO: YELLOW
CREAT SERPL-MCNC: 2.1 MG/DL (ref 0.5–1.4)
DIFFERENTIAL METHOD: ABNORMAL
DIFFERENTIAL METHOD: ABNORMAL
EOSINOPHIL # BLD AUTO: 0.3 K/UL (ref 0–0.5)
EOSINOPHIL # BLD AUTO: 0.3 K/UL (ref 0–0.5)
EOSINOPHIL NFR BLD: 3.3 % (ref 0–8)
EOSINOPHIL NFR BLD: 3.3 % (ref 0–8)
ERYTHROCYTE [DISTWIDTH] IN BLOOD BY AUTOMATED COUNT: 23 % (ref 11.5–14.5)
ERYTHROCYTE [DISTWIDTH] IN BLOOD BY AUTOMATED COUNT: 23 % (ref 11.5–14.5)
EST. GFR  (AFRICAN AMERICAN): 40.3 ML/MIN/1.73 M^2
EST. GFR  (NON AFRICAN AMERICAN): 34.9 ML/MIN/1.73 M^2
GLUCOSE SERPL-MCNC: 176 MG/DL (ref 70–110)
GLUCOSE UR QL STRIP: NEGATIVE
HCT VFR BLD AUTO: 22.9 % (ref 40–54)
HCT VFR BLD AUTO: 22.9 % (ref 40–54)
HGB BLD-MCNC: 6.6 G/DL (ref 14–18)
HGB BLD-MCNC: 6.6 G/DL (ref 14–18)
HGB UR QL STRIP: NEGATIVE
IMM GRANULOCYTES # BLD AUTO: 0.1 K/UL (ref 0–0.04)
IMM GRANULOCYTES # BLD AUTO: 0.1 K/UL (ref 0–0.04)
IMM GRANULOCYTES NFR BLD AUTO: 1.2 % (ref 0–0.5)
IMM GRANULOCYTES NFR BLD AUTO: 1.2 % (ref 0–0.5)
INR PPP: 1.1 (ref 0.8–1.2)
INR PPP: 1.1 (ref 0.8–1.2)
KETONES UR QL STRIP: NEGATIVE
LEUKOCYTE ESTERASE UR QL STRIP: NEGATIVE
LYMPHOCYTES # BLD AUTO: 0.7 K/UL (ref 1–4.8)
LYMPHOCYTES # BLD AUTO: 0.7 K/UL (ref 1–4.8)
LYMPHOCYTES NFR BLD: 8.4 % (ref 18–48)
LYMPHOCYTES NFR BLD: 8.4 % (ref 18–48)
MAGNESIUM SERPL-MCNC: 1.9 MG/DL (ref 1.6–2.6)
MCH RBC QN AUTO: 26.5 PG (ref 27–31)
MCH RBC QN AUTO: 26.5 PG (ref 27–31)
MCHC RBC AUTO-ENTMCNC: 28.8 G/DL (ref 32–36)
MCHC RBC AUTO-ENTMCNC: 28.8 G/DL (ref 32–36)
MCV RBC AUTO: 92 FL (ref 82–98)
MCV RBC AUTO: 92 FL (ref 82–98)
MONOCYTES # BLD AUTO: 1.8 K/UL (ref 0.3–1)
MONOCYTES # BLD AUTO: 1.8 K/UL (ref 0.3–1)
MONOCYTES NFR BLD: 20.7 % (ref 4–15)
MONOCYTES NFR BLD: 20.7 % (ref 4–15)
NEUTROPHILS # BLD AUTO: 5.6 K/UL (ref 1.8–7.7)
NEUTROPHILS # BLD AUTO: 5.6 K/UL (ref 1.8–7.7)
NEUTROPHILS NFR BLD: 66 % (ref 38–73)
NEUTROPHILS NFR BLD: 66 % (ref 38–73)
NITRITE UR QL STRIP: NEGATIVE
NRBC BLD-RTO: 0 /100 WBC
NRBC BLD-RTO: 0 /100 WBC
PH UR STRIP: 6 [PH] (ref 5–8)
PHOSPHATE SERPL-MCNC: 4.1 MG/DL (ref 2.7–4.5)
PLATELET # BLD AUTO: 224 K/UL (ref 150–350)
PLATELET # BLD AUTO: 224 K/UL (ref 150–350)
PMV BLD AUTO: 9.8 FL (ref 9.2–12.9)
PMV BLD AUTO: 9.8 FL (ref 9.2–12.9)
POTASSIUM SERPL-SCNC: 4.5 MMOL/L (ref 3.5–5.1)
PROT SERPL-MCNC: 7.6 G/DL (ref 6–8.4)
PROT UR QL STRIP: NEGATIVE
PROTHROMBIN TIME: 11 SEC (ref 9–12.5)
PROTHROMBIN TIME: 11.3 SEC (ref 9–12.5)
RBC # BLD AUTO: 2.49 M/UL (ref 4.6–6.2)
RBC # BLD AUTO: 2.49 M/UL (ref 4.6–6.2)
SODIUM SERPL-SCNC: 137 MMOL/L (ref 136–145)
SP GR UR STRIP: 1.01 (ref 1–1.03)
URN SPEC COLLECT METH UR: NORMAL
WBC # BLD AUTO: 8.44 K/UL (ref 3.9–12.7)
WBC # BLD AUTO: 8.44 K/UL (ref 3.9–12.7)

## 2020-05-14 PROCEDURE — 85025 COMPLETE CBC W/AUTO DIFF WBC: CPT

## 2020-05-14 PROCEDURE — 25000003 PHARM REV CODE 250: Performed by: INTERNAL MEDICINE

## 2020-05-14 PROCEDURE — 25000003 PHARM REV CODE 250: Performed by: STUDENT IN AN ORGANIZED HEALTH CARE EDUCATION/TRAINING PROGRAM

## 2020-05-14 PROCEDURE — 85730 THROMBOPLASTIN TIME PARTIAL: CPT

## 2020-05-14 PROCEDURE — 85610 PROTHROMBIN TIME: CPT | Mod: 91

## 2020-05-14 PROCEDURE — 86901 BLOOD TYPING SEROLOGIC RH(D): CPT

## 2020-05-14 PROCEDURE — 99900035 HC TECH TIME PER 15 MIN (STAT)

## 2020-05-14 PROCEDURE — 84100 ASSAY OF PHOSPHORUS: CPT

## 2020-05-14 PROCEDURE — 94761 N-INVAS EAR/PLS OXIMETRY MLT: CPT

## 2020-05-14 PROCEDURE — 99223 1ST HOSP IP/OBS HIGH 75: CPT | Mod: ,,, | Performed by: INTERNAL MEDICINE

## 2020-05-14 PROCEDURE — 80053 COMPREHEN METABOLIC PANEL: CPT

## 2020-05-14 PROCEDURE — 36415 COLL VENOUS BLD VENIPUNCTURE: CPT

## 2020-05-14 PROCEDURE — 97161 PT EVAL LOW COMPLEX 20 MIN: CPT

## 2020-05-14 PROCEDURE — 97116 GAIT TRAINING THERAPY: CPT

## 2020-05-14 PROCEDURE — 86920 COMPATIBILITY TEST SPIN: CPT

## 2020-05-14 PROCEDURE — 11000001 HC ACUTE MED/SURG PRIVATE ROOM

## 2020-05-14 PROCEDURE — 83735 ASSAY OF MAGNESIUM: CPT

## 2020-05-14 PROCEDURE — 99223 PR INITIAL HOSPITAL CARE,LEVL III: ICD-10-PCS | Mod: ,,, | Performed by: INTERNAL MEDICINE

## 2020-05-14 PROCEDURE — 97535 SELF CARE MNGMENT TRAINING: CPT

## 2020-05-14 PROCEDURE — 81003 URINALYSIS AUTO W/O SCOPE: CPT

## 2020-05-14 PROCEDURE — 97165 OT EVAL LOW COMPLEX 30 MIN: CPT

## 2020-05-14 PROCEDURE — 27000221 HC OXYGEN, UP TO 24 HOURS

## 2020-05-14 PROCEDURE — 97530 THERAPEUTIC ACTIVITIES: CPT

## 2020-05-14 RX ORDER — ISOSORBIDE DINITRATE 20 MG/1
20 TABLET ORAL 3 TIMES DAILY
Status: DISCONTINUED | OUTPATIENT
Start: 2020-05-14 | End: 2020-05-15 | Stop reason: HOSPADM

## 2020-05-14 RX ORDER — FUROSEMIDE 10 MG/ML
120 INJECTION INTRAMUSCULAR; INTRAVENOUS
Status: DISCONTINUED | OUTPATIENT
Start: 2020-05-14 | End: 2020-05-14

## 2020-05-14 RX ORDER — FERROUS SULFATE 325(65) MG
325 TABLET, DELAYED RELEASE (ENTERIC COATED) ORAL DAILY
Status: DISCONTINUED | OUTPATIENT
Start: 2020-05-14 | End: 2020-05-15 | Stop reason: HOSPADM

## 2020-05-14 RX ORDER — IBUPROFEN 200 MG
24 TABLET ORAL
Status: DISCONTINUED | OUTPATIENT
Start: 2020-05-14 | End: 2020-05-15 | Stop reason: HOSPADM

## 2020-05-14 RX ORDER — GLUCAGON 1 MG
1 KIT INJECTION
Status: DISCONTINUED | OUTPATIENT
Start: 2020-05-14 | End: 2020-05-15 | Stop reason: HOSPADM

## 2020-05-14 RX ORDER — IBUPROFEN 200 MG
16 TABLET ORAL
Status: DISCONTINUED | OUTPATIENT
Start: 2020-05-14 | End: 2020-05-15 | Stop reason: HOSPADM

## 2020-05-14 RX ORDER — SODIUM CHLORIDE 0.9 % (FLUSH) 0.9 %
10 SYRINGE (ML) INJECTION
Status: DISCONTINUED | OUTPATIENT
Start: 2020-05-14 | End: 2020-05-15 | Stop reason: HOSPADM

## 2020-05-14 RX ORDER — FUROSEMIDE 40 MG/1
120 TABLET ORAL 2 TIMES DAILY
Status: DISCONTINUED | OUTPATIENT
Start: 2020-05-14 | End: 2020-05-15 | Stop reason: HOSPADM

## 2020-05-14 RX ORDER — HEPARIN SODIUM,PORCINE/D5W 25000/250
12 INTRAVENOUS SOLUTION INTRAVENOUS CONTINUOUS
Status: DISCONTINUED | OUTPATIENT
Start: 2020-05-14 | End: 2020-05-14

## 2020-05-14 RX ORDER — WARFARIN SODIUM 5 MG/1
5 TABLET ORAL DAILY
Status: DISCONTINUED | OUTPATIENT
Start: 2020-05-14 | End: 2020-05-14

## 2020-05-14 RX ORDER — ASPIRIN 81 MG/1
81 TABLET ORAL DAILY
Status: DISCONTINUED | OUTPATIENT
Start: 2020-05-14 | End: 2020-05-15 | Stop reason: HOSPADM

## 2020-05-14 RX ORDER — HYDROCODONE BITARTRATE AND ACETAMINOPHEN 500; 5 MG/1; MG/1
TABLET ORAL
Status: DISCONTINUED | OUTPATIENT
Start: 2020-05-14 | End: 2020-05-15

## 2020-05-14 RX ORDER — PROCHLORPERAZINE MALEATE 5 MG
10 TABLET ORAL EVERY 6 HOURS PRN
Status: DISCONTINUED | OUTPATIENT
Start: 2020-05-14 | End: 2020-05-15 | Stop reason: HOSPADM

## 2020-05-14 RX ORDER — DIGOXIN 125 MCG
125 TABLET ORAL DAILY
Status: DISCONTINUED | OUTPATIENT
Start: 2020-05-14 | End: 2020-05-15 | Stop reason: HOSPADM

## 2020-05-14 RX ORDER — TALC
6 POWDER (GRAM) TOPICAL NIGHTLY PRN
Status: DISCONTINUED | OUTPATIENT
Start: 2020-05-14 | End: 2020-05-15 | Stop reason: HOSPADM

## 2020-05-14 RX ORDER — ONDANSETRON 8 MG/1
8 TABLET, ORALLY DISINTEGRATING ORAL EVERY 8 HOURS PRN
Status: DISCONTINUED | OUTPATIENT
Start: 2020-05-14 | End: 2020-05-15 | Stop reason: HOSPADM

## 2020-05-14 RX ADMIN — ISOSORBIDE DINITRATE 20 MG: 20 TABLET ORAL at 09:05

## 2020-05-14 RX ADMIN — DIGOXIN 125 MCG: 125 TABLET ORAL at 10:05

## 2020-05-14 RX ADMIN — FERROUS SULFATE TAB EC 325 MG (65 MG FE EQUIVALENT) 325 MG: 325 (65 FE) TABLET DELAYED RESPONSE at 10:05

## 2020-05-14 RX ADMIN — ASPIRIN 81 MG: 81 TABLET, COATED ORAL at 10:05

## 2020-05-14 RX ADMIN — ISOSORBIDE DINITRATE 20 MG: 20 TABLET ORAL at 10:05

## 2020-05-14 NOTE — ASSESSMENT & PLAN NOTE
- Continue home isosorbide dinitrate  - Holding home hydralazine - BP on the lower end when seen in ED   Restart as necessary

## 2020-05-14 NOTE — PLAN OF CARE
Problem: Wound  Goal: Optimal Wound Healing  Outcome: Ongoing, Progressing     Problem: Fall Injury Risk  Goal: Absence of Fall and Fall-Related Injury  Outcome: Ongoing, Progressing     Problem: Adult Inpatient Plan of Care  Goal: Plan of Care Review  Outcome: Ongoing, Progressing  Goal: Patient-Specific Goal (Individualization)  Outcome: Ongoing, Progressing  Goal: Absence of Hospital-Acquired Illness or Injury  Outcome: Ongoing, Progressing  Goal: Optimal Comfort and Wellbeing  Outcome: Ongoing, Progressing  Goal: Readiness for Transition of Care  Outcome: Ongoing, Progressing  Goal: Rounds/Family Conference  Outcome: Ongoing, Progressing     Problem: Electrolyte Imbalance (Acute Kidney Injury/Impairment)  Goal: Serum Electrolyte Balance  Outcome: Ongoing, Progressing     Problem: Fluid Imbalance (Acute Kidney Injury/Impairment)  Goal: Optimal Fluid Balance  Outcome: Ongoing, Progressing     Problem: Hematologic Alteration (Acute Kidney Injury/Impairment)  Goal: Hemoglobin, Hematocrit and Platelets Within Normal Range  Outcome: Ongoing, Progressing     Problem: Oral Intake Inadequate (Acute Kidney Injury/Impairment)  Goal: Optimal Nutrition Intake  Outcome: Ongoing, Progressing     Problem: Renal Function Impairment (Acute Kidney Injury/Impairment)  Goal: Effective Renal Function  Outcome: Ongoing, Progressing     Problem: Skin Injury Risk Increased  Goal: Skin Health and Integrity  Outcome: Ongoing, Progressing

## 2020-05-14 NOTE — PLAN OF CARE
CM met with patient in room for Discharge Planning Assessment. Per patient,  he lives in a house with 3 step(s) to enter.   Per patient, he was semi dependent with ADLS and used DME for ambulation. Patient reports that he will need transportation set up by CM team on d/c. Patient reports he does not like [facility] services; CM will look into complaint.  Discharge Planning Booklet given to patient and discussed.  All questions addressed.  CM will follow for needs.     05/14/20 9392   Discharge Assessment   Assessment Type Discharge Planning Assessment   Confirmed/corrected address and phone number on facesheet? Yes   Assessment information obtained from? Patient   Expected Length of Stay (days) 2   Communicated expected length of stay with patient/caregiver yes   Prior to hospitilization cognitive status: Alert/Oriented   Prior to hospitalization functional status: Assistive Equipment   Current cognitive status: Alert/Oriented;Inappropriate Behavior  (Kept sheet over head for entire assessment)   Current Functional Status: Needs Assistance   Lives With spouse;other relative(s)  (brother, wife, uncle)   Able to Return to Prior Arrangements other (see comments)  (TBD)   Is patient able to care for self after discharge? No   Who are your caregiver(s) and their phone number(s)? [facility] and family memebers who live in the home    Patient's perception of discharge disposition home health;hospice/home   Readmission Within the Last 30 Days no previous admission in last 30 days   Patient currently being followed by outpatient case management? No   Patient currently receives any other outside agency services? Yes   Name and contact number of agency or person providing outside services [facility] (patient doesn't like the service)    Is it the patient/care giver preference to resume care with the current outside agency? No   Equipment Currently Used at Home walker, rolling;wheelchair   Do you have any  problems affording any of your prescribed medications? TBD   Is the patient taking medications as prescribed? yes   Does the patient have transportation home? No   Does the patient receive services at the [facility]? Yes   Discharge Plan A Home Health   Discharge Plan B Hospice/home   DME Needed Upon Discharge  other (see comments)  (TBD)   Patient/Family in Agreement with Plan yes            PCP:  Carlin Swift MD        Pharmacy:    [facility]/pharmacy #[id number] - [place], [place] - [address] [facility]  [address] [facility] [address]  Phone: [phone] Fax: [phone]        Emergency Contacts:  Extended Emergency Contact Information  Primary Emergency Contact: Kris Terrell   [place]  Mobile Phone: [phone]  Relation: Brother  Preferred language: English      Insurance:    Payor: MEDICAID / Plan: [facility] / Product Type: Managed Medicaid /       05/14/2020  9:39 AM      Anne Marie Hernandez RN, CM   Ext: [phone]

## 2020-05-14 NOTE — NURSING
"Patient refusing to have IV placed, states " I'm going to get some sleep first, then I'll let you know what I decide.   notified will continue to monitor.  "

## 2020-05-14 NOTE — ED NOTES
Patient refusing to have RN remove soiled adult diaper and be cleaned. Refusing to wear nc and mask. Needs assistance with urinal; refusing to wear external catheter.

## 2020-05-14 NOTE — ED NOTES
"Patient requesting nc for transport. 100% on ra. Patient placed on 2L nc for transport per his request. Patient refusing to wear face mask for transport. RN educated patient on hospital policy. Face mask placed on patient. Per patient "im taking this shit off as soon as I get out of here."  "

## 2020-05-14 NOTE — HPI
Mr. Terrell is a 54M with chronic combined systolic and diastolic heart failure (EF 25%) on digoxin, afib on coumadin, CAD, CKD3, history of CVA, and history of PE here for shortness of breath and lower extremity edema. Patient has been admitted multiple times in the past for similar complaints. He was recently re-admitted to home hospice with [facility] on 5/8/2020. He complains that they have not been by to see him in weeks and is upset because he had a recent fall and was on the floor for over 5 hours. He ate a meatball sub today and choked on a piece of meat with subsequent emesis. No episodes of vomiting since. He is a poor historian, is unable to recite what medications he is on but reports adherence as he uses a pill box. He was previously on a dobutamine drip, which was stopped because it gave him shakes. Per patient, torsemide 40mg bid does not help control his edema at home. When asked about the goals of this hospitalization and what his proposed discharge plans are, he reports that he wants to get the fluid off and to feel stronger with physical therapy. He understands that he has advanced heart failure and prior notes have indicated that he is not open to further options for management of this heart failure.    [facility] was contacted to clarify some history. He was recently readmitted 5/8/2020 had a face visit on day of readmission. Since then, he has had multiple virtual visits, including one with the , nurse, and  day prior to admit here at [facility]. He did not call to notify the hospice about his fall nor did he notify them about coming in today.    On arrival to the ED, patient was short of breath, particularly on exertion. Hgb at 6.7, last Hgb >7 was in 1/2020. INR was 1.1, digoxin level <0.1. S/p 1x lasix 80mg dose with 500cc UOP. Patient admitted to hospital medicine for management of acute on chronic heart failure.

## 2020-05-14 NOTE — H&P
[facility] Hospital Medicine  History & Physical    Patient Name: Hamlet Terrell  MRN: [id number]  Admission Date: 5/13/2020  Attending Physician: Marianne Cordero MD   Primary Care Provider: Carlin Swift MD    [facility] Medicine Team: [facility] MED 2 Brooke Liang Olivejennifer Mao MD     Patient information was obtained from patient, past medical records and ER records.     Subjective:     Principal Problem:Acute on chronic combined systolic and diastolic heart failure    Chief Complaint:   Chief Complaint   Patient presents with    Emesis     Patient reports eating one bite a meatball sandwich from [employer] and started vomiting. Patient reports generalized weakness for the past 2 days. Reports diarrhea for the past month. Patient also reports bilateral leg swelling for 2 months.     Leg Swelling        HPI: Mr. Terrell is a 54M with chronic combined systolic and diastolic heart failure (EF 25%) on digoxin, afib on coumadin, CAD, CKD3, history of CVA, and history of PE here for shortness of breath and lower extremity edema. Patient has been admitted multiple times in the past for similar complaints. He was recently re-admitted to home hospice with [facility] on 5/8/2020. He complains that they have not been by to see him in weeks and is upset because he had a recent fall and was on the floor for over 5 hours. He ate a meatball sub today and choked on a piece of meat with subsequent emesis. No episodes of vomiting since. He is a poor historian, is unable to recite what medications he is on but reports adherence as he uses a pill box. He was previously on a dobutamine drip, which was stopped because it gave him shakes. Per patient, torsemide 40mg bid does not help control his edema at home. When asked about the goals of this hospitalization and what his proposed discharge plans are, he reports that he wants to get the fluid off and to feel stronger with physical therapy. He understands that he has  advanced heart failure and prior notes have indicated that he is not open to further options for management of this heart failure.    [facility] was contacted to clarify some history. He was recently readmitted 5/8/2020 had a face visit on day of readmission. Since then, he has had multiple virtual visits, including one with the , nurse, and  day prior to admit here at [facility]. He did not call to notify the hospice about his fall nor did he notify them about coming in today.    On arrival to the ED, patient was short of breath, particularly on exertion. Hgb at 6.7, last Hgb >7 was in 1/2020. INR was 1.1, digoxin level <0.1. S/p 1x lasix 80mg dose with 500cc UOP. Patient admitted to hospital medicine for management of acute on chronic heart failure.    Past Medical History:   Diagnosis Date    Anticoagulant long-term use     Cardiomegaly     Chronic combined systolic and diastolic congestive heart failure     Coronary artery disease     Heart attack 2006    Hypertension     Hyperthyroidism, subclinical 1/2/2013    MI (myocardial infarction) 9/22/2013    MI in 2009      Paroxysmal atrial fibrillation     PE (pulmonary embolism) 1/1/2013    IN 2010     S/P ablation of atrial flutter 2008    Stroke 2009    no residual weaknesses       Past Surgical History:   Procedure Laterality Date    COLONOSCOPY N/A 12/2/2019    Procedure: COLONOSCOPY;  Surgeon: Scott Rosario MD;  Location: [address]);  Service: Endoscopy;  Laterality: N/A;    ESOPHAGOGASTRODUODENOSCOPY N/A 12/2/2019    Procedure: EGD (ESOPHAGOGASTRODUODENOSCOPY);  Surgeon: Scott Rosario MD;  Location: [address]);  Service: Endoscopy;  Laterality: N/A;    RADIOFREQUENCY ABLATION  01/08/2008    for atrial flutter       Review of patient's allergies indicates:   Allergen Reactions    Acetaminophen      Itching    Oxycodone-acetaminophen      Other reaction(s): Itching    Ace inhibitors Other (See  Comments)     cough       No current facility-administered medications on file prior to encounter.      Current Outpatient Medications on File Prior to Encounter   Medication Sig    albuterol (PROVENTIL/VENTOLIN HFA) 90 mcg/actuation inhaler Inhale 1-2 puffs into the lungs every 6 (six) hours as needed for Wheezing. Rescue    albuterol-ipratropium (DUO-NEB) 2.5 mg-0.5 mg/3 mL nebulizer solution Take 3 mLs by nebulization every 6 (six) hours as needed for Wheezing. Rescue    aspirin (ECOTRIN) 81 MG EC tablet Take 81 mg by mouth once daily.    BISACODYL RECT Place 10 mg rectally daily as needed.    digoxin (LANOXIN) 125 mcg tablet Take 125 mcg by mouth once daily.    ferrous sulfate (FEOSOL) 325 mg (65 mg iron) Tab tablet Take 1 tablet (325 mg total) by mouth once daily.    hydrALAZINE (APRESOLINE) 25 MG tablet Take 1 tablet (25 mg total) by mouth 3 (three) times daily.    isosorbide dinitrate (ISORDIL) 20 MG tablet Take 1 tablet (20 mg total) by mouth 3 (three) times daily.    lorazepam 2 mg/ml oral conc (ATIVAN) 2 mg/mL Conc Take 2 mg by mouth every 4 (four) hours as needed.    methocarbamol (ROBAXIN) 500 MG Tab Take 500 mg by mouth 3 (three) times daily as needed.    metOLazone (ZAROXOLYN) 10 MG tablet Take 10 mg by mouth every other day.    morphine (MS CONTIN) 15 MG 12 hr tablet Take 15 mg by mouth 2 (two) times daily.    morphine 100 mg/5 mL (20 mg/mL) concentrated solution Take 10 mg by mouth every 2 (two) hours as needed for Pain.    nitroGLYCERIN (NITROSTAT) 0.4 MG SL tablet Place 1 tablet (0.4 mg total) under the tongue every 5 (five) minutes as needed for Chest pain. Tablet, Sublingual Sublingual    ondansetron (ZOFRAN) 4 MG tablet Take 1 tablet (4 mg total) by mouth every 8 (eight) hours as needed for Nausea.    oxyCODONE (ROXICODONE) 15 MG Tab Take 1 tablet (15 mg total) by mouth every 4 (four) hours as needed for Pain.    polyethylene glycol (GLYCOLAX) 17 gram PwPk Take by mouth once  daily.    potassium chloride (KLOR-CON SPRINKLE) 10 MEQ CpSR Take 10 mEq by mouth once daily.    senna-docusate 8.6-50 mg (PERICOLACE) 8.6-50 mg per tablet Take 1 tablet by mouth 2 (two) times daily as needed for Constipation.    torsemide (DEMADEX) 20 MG Tab Take 2 tablets (40 mg total) by mouth 2 (two) times daily.    warfarin (COUMADIN) 6 MG tablet Take 1 tablet (6 mg total) by mouth Daily.     Family History     Problem Relation (Age of Onset)    Alcohol abuse Father    Hypertension Mother, Father, Sister, Brother    Stroke Mother        Tobacco Use    Smoking status: Never Smoker    Smokeless tobacco: Never Used   Substance and Sexual Activity    Alcohol use: No    Drug use: No    Sexual activity: Never     Review of Systems   Constitutional: Negative for activity change, chills and fatigue.   HENT: Negative for congestion, sinus pressure and sinus pain.    Eyes: Negative for photophobia and pain.   Respiratory: Positive for shortness of breath. Negative for cough and wheezing.    Cardiovascular: Positive for leg swelling. Negative for chest pain.   Gastrointestinal: Negative for abdominal pain, constipation, diarrhea, nausea and vomiting.   Genitourinary: Negative for dysuria, hematuria and urgency.   Musculoskeletal: Positive for back pain (2/2 ulcer) and gait problem (walks with walker).   Skin: Negative for color change and pallor.   Neurological: Negative for dizziness, seizures and weakness.   Psychiatric/Behavioral: Negative for suicidal ideas. The patient is not nervous/anxious and is not hyperactive.      Objective:     Vital Signs (Most Recent):  Temp: 98.9 °F (37.2 °C) (05/13/20 2009)  Pulse: (!) 113 (05/13/20 2200)  Resp: (!) 24 (05/13/20 2100)  BP: (!) 129/56 (05/13/20 2200)  SpO2: 97 % (05/13/20 2200) Vital Signs (24h Range):  Temp:  [98.9 °F (37.2 °C)] 98.9 °F (37.2 °C)  Pulse:  [106-121] 113  Resp:  [22-24] 24  SpO2:  [95 %-100 %] 97 %  BP: (116-132)/(56-72) 129/56     Weight: 115.4 kg  (254 lb 6.6 oz)  Body mass index is 37.57 kg/m².    Physical Exam   Constitutional: He is oriented to person, place, and time. He appears well-developed.   HENT:   Head: Normocephalic and atraumatic.   Eyes: Conjunctivae and EOM are normal. No scleral icterus.   Neck: Normal range of motion. Neck supple.   Cardiovascular: Normal rate and regular rhythm.   No murmur heard.  Pulmonary/Chest: Breath sounds normal. He has no wheezes. He has no rales.   Abdominal: Soft. Bowel sounds are normal. He exhibits no distension. There is no guarding.   Musculoskeletal: He exhibits edema (3+ BLE to thighs) and tenderness.   Neurological: He is alert and oriented to person, place, and time.   Skin: Skin is warm. He is not diaphoretic. No erythema.   Trophic changes on bilateral LE   Nursing note and vitals reviewed.        CRANIAL NERVES     CN III, IV, VI   Extraocular motions are normal.        Significant Labs:   CBC:   Recent Labs   Lab 05/13/20 2039   WBC 9.18   HGB 6.7*   HCT 23.2*        CMP:   Recent Labs   Lab 05/13/20 2039      K 4.7      CO2 24   *   BUN 91*   CREATININE 2.1*   CALCIUM 9.1   PROT 7.8   ALBUMIN 2.4*   BILITOT 1.7*   ALKPHOS 280*   AST 35   ALT 22   ANIONGAP 13   EGFRNONAA 34.9*     Cardiac Markers:   Recent Labs   Lab 05/13/20 2039   *     Magnesium:   Recent Labs   Lab 05/13/20 2039   MG 1.9     Urine Studies:   Recent Labs   Lab 05/14/20  0018   COLORU Yellow   APPEARANCEUA Clear   PHUR 6.0   SPECGRAV 1.010   PROTEINUA Negative   GLUCUA Negative   KETONESU Negative   BILIRUBINUA Negative   OCCULTUA Negative   NITRITE Negative   LEUKOCYTESUR Negative       Significant Imaging: I have reviewed all pertinent imaging results/findings within the past 24 hours.     X-Ray Chest AP Portable  Narrative: EXAMINATION:  XR CHEST AP PORTABLE    CLINICAL HISTORY:  Suspected Covid-19 Virus Infection;    TECHNIQUE:  Single frontal view of the chest was  performed.    COMPARISON:  March 7, 2020    FINDINGS:  The cardiac silhouette remains enlarged pulmonary vascularity is not increased.  There has been some interval clearing of the abnormal opacification in the right mid and lower lung since the previous examination.  Pleural fluid has decreased at the right lung base.  Masslike opacity projected over the right lung has decreased slightly in size when compared to multiple examinations dating back to November 24, 2019.  And may represent fluid in the fissure.  No definite left-sided pleural effusion.  No pneumothorax.  Visualized osseous structures are stable.  Impression: As above.    Electronically signed by: Eduarda Truong MD  Date:    05/13/2020  Time:    21:42        Assessment/Plan:     * Acute on chronic combined systolic and diastolic heart failure  Patient presenting today with worsening shortness of breath and lower extremity edema concerning for acute decompensated heart failure. BNP on admit 334. Patient had been on dobutamine gtt recently, which was stopped due to shaking. He is unlikely adherent to medications at home as he is currently on digoxin and level was <0.1. Also on torsemide and metolazone for diuresis, which he reports does not help. He appears to understand his advanced heart failure condition and accepts that he is hospice appropriate; however, he does not appear to understand how to utilize the hospice company's resources to its maximal potential. His goals this hospitalization are to remove fluid and to get stronger with physical therapy.    TTE 11/2019 with EF 25%, global hypokinetic wall motion abnormalities, concentric LV hypertrophy    - Started patient on lasix 120mg bid for diuresis   Add metolazone or diuril if inadequate UOP  - Continue digoxin, isosorbide  - Low sodium diet  - Would consider having cardiology come back to discuss his options with him given his advanced heart failure. Prior discussions have not led to any  options acceptable by patient.  - Unsure of value of palliative care involvement at this time given that patient appears to understand his diagnosis and condition  - Consulted social work for assistance in discharge planning - [facility] appears willing to have patient back home with hospice on discharge, but he may provide pushback on this    Weakness of lower extremity  - PT/OT      Essential hypertension  - Continue home isosorbide dinitrate  - Holding home hydralazine - BP on the lower end when seen in ED   Restart as necessary    Atrial fibrillation, chronic  CHADSVASC 5  On warfarin at home, but INR subtherapeutic at 1.1 on admit    - Ideally would start patient on a heparin gtt as a bridge to warfarin, but patient refusing second IV for administration  - Once agreeable, would start heparin gtt (order in) and restart warfarin at 5mg daily that same day   Daily INR once warfarin initiated  - Cardiac monitoring      VTE Risk Mitigation (From admission, onward)         Ordered     heparin 25,000 units in dextrose 5% (100 units/ml) IV bolus from bag INITIAL BOLUS  Once     Question:  Heparin Infusion Adjustment (DO NOT MODIFY ANSWER)  Answer:  \\[email or web address]\epic\Images\Pharmacy\HeparinInfusions\heparin LOW INTENSITY nomogram for [facility] [id number]    05/14/20 0005     heparin 25,000 units in dextrose 5% 250 mL (100 units/mL) infusion LOW INTENSITY nomogram - [facility]  Continuous     Question:  Heparin Infusion Adjustment (DO NOT MODIFY ANSWER)  Answer:  \\[email or web address]\epic\Images\Pharmacy\HeparinInfusions\heparin LOW INTENSITY nomogram for [facility] [id number]    05/14/20 0005     heparin 25,000 units in dextrose 5% (100 units/ml) IV bolus from bag - ADDITIONAL PRN BOLUS - 60 units/kg  As needed (PRN)     Question:  Heparin Infusion Adjustment (DO NOT MODIFY ANSWER)  Answer:  \[facility] [employer]\epic\Images\Pharmacy\HeparinInfusions\heparin LOW INTENSITY nomogram for [facility] [id number].pdf    05/14/20 0005     heparin 25,000 units in  dextrose 5% (100 units/ml) IV bolus from bag - ADDITIONAL PRN BOLUS - 30 units/kg  As needed (PRN)     Question:  Heparin Infusion Adjustment (DO NOT MODIFY ANSWER)  Answer:  \\[email or web address]\epic\Images\Pharmacy\HeparinInfusions\heparin LOW INTENSITY nomogram for [facility] [id number]    05/14/20 0005     IP VTE HIGH RISK PATIENT  Once      05/13/20 2248     Place sequential compression device  Until discontinued      05/13/20 2248                   Brooke aMo MD  Department of Hospital Medicine   [facility]

## 2020-05-14 NOTE — PLAN OF CARE
Miguel did speak with Elba at  with [facility]. Pt will be accepted back per hospice agency, Miguel to send clinical information through the [facility]. CM/Miguel to follow in the am with solomon

## 2020-05-14 NOTE — PLAN OF CARE
Problem: Physical Therapy Goal  Goal: Physical Therapy Goal  Description  Goals to be met by: 30     Patient will increase functional independence with mobility by performin. Supine to sit with [place]  2. Sit to supine with [place]  3. Sit to stand transfer with [place]  4. Gait  x 50 feet with [place] using LRAD,if needed     Outcome: Ongoing, Progressing   Initial evaluation completed. Patient tolerated assessment well. Established POC and goals. Patient would continue to benefit from skilled PT services in order to improve functional mobility independence.       Uma Johnson, PT, DPT  2020

## 2020-05-14 NOTE — SUBJECTIVE & OBJECTIVE
Past Medical History:   Diagnosis Date    Anticoagulant long-term use     Cardiomegaly     Chronic combined systolic and diastolic congestive heart failure     Coronary artery disease     Heart attack 2006    Hypertension     Hyperthyroidism, subclinical 1/2/2013    MI (myocardial infarction) 9/22/2013    MI in 2009      Paroxysmal atrial fibrillation     PE (pulmonary embolism) 1/1/2013    IN 2010     S/P ablation of atrial flutter 2008    Stroke 2009    no residual weaknesses       Past Surgical History:   Procedure Laterality Date    COLONOSCOPY N/A 12/2/2019    Procedure: COLONOSCOPY;  Surgeon: Scott Rsoario MD;  [facility]  Service: Endoscopy;  Laterality: N/A;    ESOPHAGOGASTRODUODENOSCOPY N/A 12/2/2019    Procedure: EGD (ESOPHAGOGASTRODUODENOSCOPY);  Surgeon: Scott Rosario MD;  [facility]  Service: Endoscopy;  Laterality: N/A;    RADIOFREQUENCY ABLATION  01/08/2008    for atrial flutter       Review of patient's allergies indicates:   Allergen Reactions    Acetaminophen      Itching    Oxycodone-acetaminophen      Other reaction(s): Itching    Ace inhibitors Other (See Comments)     cough       No current facility-administered medications on file prior to encounter.      Current Outpatient Medications on File Prior to Encounter   Medication Sig    albuterol (PROVENTIL/VENTOLIN HFA) 90 mcg/actuation inhaler Inhale 1-2 puffs into the lungs every 6 (six) hours as needed for Wheezing. Rescue    albuterol-ipratropium (DUO-NEB) 2.5 mg-0.5 mg/3 mL nebulizer solution Take 3 mLs by nebulization every 6 (six) hours as needed for Wheezing. Rescue    aspirin (ECOTRIN) 81 MG EC tablet Take 81 mg by mouth once daily.    BISACODYL RECT Place 10 mg rectally daily as needed.    digoxin (LANOXIN) 125 mcg tablet Take 125 mcg by mouth once daily.    ferrous sulfate (FEOSOL) 325 mg (65 mg iron) Tab tablet Take 1 tablet (325 mg total) by mouth once daily.     hydrALAZINE (APRESOLINE) 25 MG tablet Take 1 tablet (25 mg total) by mouth 3 (three) times daily.    isosorbide dinitrate (ISORDIL) 20 MG tablet Take 1 tablet (20 mg total) by mouth 3 (three) times daily.    lorazepam 2 mg/ml oral conc (ATIVAN) 2 mg/mL Conc Take 2 mg by mouth every 4 (four) hours as needed.    methocarbamol (ROBAXIN) 500 MG Tab Take 500 mg by mouth 3 (three) times daily as needed.    metOLazone (ZAROXOLYN) 10 MG tablet Take 10 mg by mouth every other day.    morphine (MS CONTIN) 15 MG 12 hr tablet Take 15 mg by mouth 2 (two) times daily.    morphine 100 mg/5 mL (20 mg/mL) concentrated solution Take 10 mg by mouth every 2 (two) hours as needed for Pain.    nitroGLYCERIN (NITROSTAT) 0.4 MG SL tablet Place 1 tablet (0.4 mg total) under the tongue every 5 (five) minutes as needed for Chest pain. Tablet, Sublingual Sublingual    ondansetron (ZOFRAN) 4 MG tablet Take 1 tablet (4 mg total) by mouth every 8 (eight) hours as needed for Nausea.    oxyCODONE (ROXICODONE) 15 MG Tab Take 1 tablet (15 mg total) by mouth every 4 (four) hours as needed for Pain.    polyethylene glycol (GLYCOLAX) 17 gram PwPk Take by mouth once daily.    potassium chloride (KLOR-CON SPRINKLE) 10 MEQ CpSR Take 10 mEq by mouth once daily.    senna-docusate 8.6-50 mg (PERICOLACE) 8.6-50 mg per tablet Take 1 tablet by mouth 2 (two) times daily as needed for Constipation.    torsemide (DEMADEX) 20 MG Tab Take 2 tablets (40 mg total) by mouth 2 (two) times daily.    warfarin (COUMADIN) 6 MG tablet Take 1 tablet (6 mg total) by mouth Daily.     Family History     Problem Relation (Age of Onset)    Alcohol abuse Father    Hypertension Mother, Father, Sister, Brother    Stroke Mother        Tobacco Use    Smoking status: Never Smoker    Smokeless tobacco: Never Used   Substance and Sexual Activity    Alcohol use: No    Drug use: No    Sexual activity: Never     Review of Systems   Constitutional: Negative for activity  change, chills and fatigue.   HENT: Negative for congestion, sinus pressure and sinus pain.    Eyes: Negative for photophobia and pain.   Respiratory: Positive for shortness of breath. Negative for cough and wheezing.    Cardiovascular: Positive for leg swelling. Negative for chest pain.   Gastrointestinal: Negative for abdominal pain, constipation, diarrhea, nausea and vomiting.   Genitourinary: Negative for dysuria, hematuria and urgency.   Musculoskeletal: Positive for back pain (2/2 ulcer) and gait problem (walks with walker).   Skin: Negative for color change and pallor.   Neurological: Negative for dizziness, seizures and weakness.   Psychiatric/Behavioral: Negative for suicidal ideas. The patient is not nervous/anxious and is not hyperactive.      Objective:     Vital Signs (Most Recent):  Temp: 98.9 °F (37.2 °C) (05/13/20 2009)  Pulse: (!) 113 (05/13/20 2200)  Resp: (!) 24 (05/13/20 2100)  BP: (!) 129/56 (05/13/20 2200)  SpO2: 97 % (05/13/20 2200) Vital Signs (24h Range):  Temp:  [98.9 °F (37.2 °C)] 98.9 °F (37.2 °C)  Pulse:  [106-121] 113  Resp:  [22-24] 24  SpO2:  [95 %-100 %] 97 %  BP: (116-132)/(56-72) 129/56     Weight: 115.4 kg (254 lb 6.6 oz)  Body mass index is 37.57 kg/m².    Physical Exam   Constitutional: He is oriented to person, place, and time. He appears well-developed.   HENT:   Head: Normocephalic and atraumatic.   Eyes: Conjunctivae and EOM are normal. No scleral icterus.   Neck: Normal range of motion. Neck supple.   Cardiovascular: Normal rate and regular rhythm.   No murmur heard.  Pulmonary/Chest: Breath sounds normal. He has no wheezes. He has no rales.   Abdominal: Soft. Bowel sounds are normal. He exhibits no distension. There is no guarding.   Musculoskeletal: He exhibits edema (3+ BLE to thighs) and tenderness.   Neurological: He is alert and oriented to person, place, and time.   Skin: Skin is warm. He is not diaphoretic. No erythema.   Trophic changes on bilateral LE   Nursing  note and vitals reviewed.        CRANIAL NERVES     CN III, IV, VI   Extraocular motions are normal.        Significant Labs:   CBC:   Recent Labs   Lab 05/13/20 2039   WBC 9.18   HGB 6.7*   HCT 23.2*        CMP:   Recent Labs   Lab 05/13/20 2039      K 4.7      CO2 24   *   BUN 91*   CREATININE 2.1*   CALCIUM 9.1   PROT 7.8   ALBUMIN 2.4*   BILITOT 1.7*   ALKPHOS 280*   AST 35   ALT 22   ANIONGAP 13   EGFRNONAA 34.9*     Cardiac Markers:   Recent Labs   Lab 05/13/20 2039   *     Magnesium:   Recent Labs   Lab 05/13/20 2039   MG 1.9     Urine Studies:   Recent Labs   Lab 05/14/20  0018   COLORU Yellow   APPEARANCEUA Clear   PHUR 6.0   SPECGRAV 1.010   PROTEINUA Negative   GLUCUA Negative   KETONESU Negative   BILIRUBINUA Negative   OCCULTUA Negative   NITRITE Negative   LEUKOCYTESUR Negative       Significant Imaging: I have reviewed all pertinent imaging results/findings within the past 24 hours.     X-Ray Chest AP Portable  Narrative: EXAMINATION:  XR CHEST AP PORTABLE    CLINICAL HISTORY:  Suspected Covid-19 Virus Infection;    TECHNIQUE:  Single frontal view of the chest was performed.    COMPARISON:  March 7, 2020    FINDINGS:  The cardiac silhouette remains enlarged pulmonary vascularity is not increased.  There has been some interval clearing of the abnormal opacification in the right mid and lower lung since the previous examination.  Pleural fluid has decreased at the right lung base.  Masslike opacity projected over the right lung has decreased slightly in size when compared to multiple examinations dating back to November 24, 2019.  And may represent fluid in the fissure.  No definite left-sided pleural effusion.  No pneumothorax.  Visualized osseous structures are stable.  Impression: As above.    Electronically signed by: Eduarda Truong MD  Date:    05/13/2020  Time:    21:42

## 2020-05-14 NOTE — NURSING
Try to go to patient room to  0900  Medication. Pt requested that I come back after he took his nap and was not going to take medication. Will go back to patient room to continue to encourage patient to comply w treatment

## 2020-05-14 NOTE — NURSING
Pt has declined IV access for most of the morning. Patient also stated that he does not want the heparin because it makes him feel funny. Pt and I came to agreement that we may try to get IV access after lunch to get IV Lasix. Will continue to encourage patient and will continue to update

## 2020-05-14 NOTE — ED TRIAGE NOTES
"Patient called ems secondary to x1 emesis after eating a meatball sub. Patient reports abdominal distention and leg swelling x2 months. States he has not taken his lasix in 2 months because "that fucking shit doesn't do shit." Reports fatigue and "overheating" x2 weeks. Arrived on 2L nc from ems but is refusing to wear oxygen. Refusing to wear mask per hospital policy secondary to sob. Patient has increased rate and effort. Patient in afib rvr. Reports Hx afib.  "

## 2020-05-14 NOTE — PLAN OF CARE
Problem: Occupational Therapy Goal  Goal: Occupational Therapy Goal  Description  Goals to be met by: 5/28/2020     Patient will increase functional independence with ADLs by performing:    UE Dressing with Set-up Assistance.  Grooming while standing at sink with Stand-by Assistance.  Stand pivot transfers with Supervision.  Toilet transfer to toilet with Supervision.  Upper extremity exercise program 2 x 10 reps, with supervision.     Outcome: Ongoing, Progressing   Patient's goals are set.   DHRUV Vargas  5/14/2020

## 2020-05-14 NOTE — PLAN OF CARE
"Patient refusing to get second IV to start heparin therapy, refused visi and vitals. Patient wants to rest and stated "he'll let me know what he decides" in regards to getting IV access. Doctor was notified, will continue to monitor.  "

## 2020-05-14 NOTE — ED NOTES
LOC: The patient is awake, alert, and oriented to place, time, situation. Affect is appropriate.  Speech is appropriate and clear.     APPEARANCE: Patient resting comfortably in no acute distress.  Patient is clean and well groomed.    SKIN: The skin is warm and dry; color consistent with ethnicity.  Patient has normal skin turgor and moist mucus membranes.  Skin intact; no breakdown, rash or bruising noted. Patient has bumps on skin to BLE.     MUSCULOSKELETAL: Patient moving upper and lower extremities without difficulty.  Reports increased weakness and fatigue. Reports generalized BLE and neck pain without injury.    RESPIRATORY: Airway is open and patent. Respirations spontaneous, even, easy, and non-labored.  Patient has an increased effort and rate.  No accessory muscle use noted. Denies cough. Reports sob. 97% on RA.     CARDIAC: Afib RVR 114bpm; reports Hx afib. 2+ peripheral edema noted. No complaints of chest pain.     ABDOMEN: Soft and tender to palpation in all 4 quadrants. Distention noted. Reports x2 episodes of emesis after eating a meatball sub tonight. Patient denies diarrhea.     NEUROLOGIC: Eyes open spontaneously.  Behavior appropriate to situation.  Follows commands; facial expression symmetrical.  Purposeful motor response noted; normal sensation in all extremities. Patient denies headache and dizziness.

## 2020-05-14 NOTE — HOSPITAL COURSE
Patient appears euvolemic, breath sounds clear, minimal edema in LEs. Upon further history with Dr Cordero, patient reveals that he came to the hospital because his brother was away and he did not feel uncomfortable being at home

## 2020-05-14 NOTE — CONSULTS
Wound care consulted for left lower buttocks shearing  PMH:   End stage combined systolic and diastolic heart failure, a fib, CAD, CKD3, h/o CVA, h/o PE.   Assessment:      05/14/20 1100        Wound 05/14/20 1100 Shearing Left lower Buttocks   Date First Assessed/Time First Assessed: 05/14/20 1100   Pre-existing: Yes  Primary Wound Type: Shearing  Side: Left  Orientation: lower  Location: Buttocks   Wound Image    Wound WDL ex   Dressing Appearance Intact;Moist drainage   Drainage Amount Small   Drainage Characteristics/Odor Clear   Appearance Moist;Pink;Fibrin;White   Tissue loss description Partial thickness   Periwound Area Intact;Dry   Wound Edges Open   Wound Length (cm) 2 cm   Wound Width (cm) 2 cm   Wound Depth (cm) 0.2 cm   Wound Volume (cm^3) 0.8 cm^3   Wound Surface Area (cm^2) 4 cm^2   Care Cleansed with:;Sterile normal saline;Applied:;Skin Barrier     Recommendations:  Triad ointment bid  Low air loss mattress  Pressure prevention measures  Nursing to continue care  Wound care signing off.  Please reconsult if needed.   MARY Rosas RN, CWCN  [phone]

## 2020-05-14 NOTE — ED PROVIDER NOTES
"Encounter Date: 5/13/2020     History     Chief Complaint   Patient presents with    Emesis     Patient reports eating one bite a meatball sandwich from [employer] and started vomiting. Patient reports generalized weakness for the past 2 days. Reports diarrhea for the past month. Patient also reports bilateral leg swelling for 2 months.     Leg Swelling     HPI   Mr. Terrell is a 54-year-old male with a past medical history notable for chronic combined systolic and diastolic heart failure with an ejection fraction of 25% has previously been in hospice with a dobutamine drip, atrial fibrillation currently on Coumadin, coronary artery disease, CVA, history of PEs in the past, who presents to the emergency department today 5-6 hours after eating a meatball sub been having nausea and vomiting, patient states that this happens every time he eats "red gravy."  States that he threw it up immediately, however has not had repeat episodes of emesis since.  Patient denies airway swelling, oral swelling, facial swelling, pruritus, chest pain, or other signs or symptoms consistent with anaphylaxis.  Patient does endorse shortness of breath but states that this has been chronic over the last several years, does have a history of heart failure but is not compliant with his medications at home.  States that his significant lower extremity swelling has been present for months.    Is currently on 2 L of home O2, is currently refusing to wear it.  Also endorses 2 days of diarrhea, and a light cough that started today.      Review of patient's allergies indicates:   Allergen Reactions    Acetaminophen      Itching    Oxycodone-acetaminophen      Other reaction(s): Itching    Ace inhibitors Other (See Comments)     cough     Past Medical History:   Diagnosis Date    Anticoagulant long-term use     Cardiomegaly     Chronic combined systolic and diastolic congestive heart failure     Coronary artery disease     Heart attack 2006    " Hypertension     Hyperthyroidism, subclinical 1/2/2013    MI (myocardial infarction) 9/22/2013    MI in 2009      Paroxysmal atrial fibrillation     PE (pulmonary embolism) 1/1/2013    IN 2010     S/P ablation of atrial flutter 2008    Stroke 2009    no residual weaknesses     Past Surgical History:   Procedure Laterality Date    COLONOSCOPY N/A 12/2/2019    Procedure: COLONOSCOPY;  Surgeon: Scott Rosario MD;  Location: [facility] ([address] [place] [address]);  Service: Endoscopy;  Laterality: N/A;    ESOPHAGOGASTRODUODENOSCOPY N/A 12/2/2019    Procedure: EGD (ESOPHAGOGASTRODUODENOSCOPY);  Surgeon: Scott Rosario MD;  [facility]  Service: Endoscopy;  Laterality: N/A;    RADIOFREQUENCY ABLATION  01/08/2008    for atrial flutter     Family History   Problem Relation Age of Onset    Hypertension Mother     Stroke Mother     Hypertension Father     Alcohol abuse Father     Hypertension Sister     Hypertension Brother      Social History     Tobacco Use    Smoking status: Never Smoker    Smokeless tobacco: Never Used   Substance Use Topics    Alcohol use: No    Drug use: No     Review of Systems   Constitutional: Positive for fatigue. Negative for appetite change, chills and fever.   HENT: Negative for congestion, sinus pressure, sneezing and sore throat.    Eyes: Negative for photophobia and visual disturbance.   Respiratory: Positive for cough and shortness of breath. Negative for wheezing and stridor.    Cardiovascular: Negative for chest pain, palpitations and leg swelling.   Gastrointestinal: Positive for abdominal pain (Chronic), diarrhea, nausea and vomiting.   Genitourinary: Negative for flank pain, frequency and hematuria.   Musculoskeletal: Negative for back pain and neck pain.   Skin: Negative for pallor, rash and wound.   Neurological: Negative for dizziness, syncope and light-headedness.   Psychiatric/Behavioral: Negative for agitation and confusion.       Physical Exam     Initial  Vitals [05/13/20 2009]   BP Pulse Resp Temp SpO2   118/72 (!) 114 (!) 22 98.9 °F (37.2 °C) 100 %      MAP       --         Physical Exam    Nursing note and vitals reviewed.  Constitutional: He appears well-developed and well-nourished. He is not diaphoretic. He is cooperative.  Non-toxic appearance. He does not have a sickly appearance. He does not appear ill. No distress.   HENT:   Head: Normocephalic and atraumatic.   Mouth/Throat: Oropharynx is clear and moist. No oropharyngeal exudate.   Eyes: Conjunctivae and EOM are normal. Pupils are equal, round, and reactive to light. No scleral icterus.   Neck: Normal range of motion. Neck supple. JVD present.   Cardiovascular: An irregularly irregular rhythm present.  Tachycardia present.    Significant pitting edema noted to bilateral lower extremities up to the level of the chest.  JVD noted.    Pulmonary/Chest: No accessory muscle usage or stridor. Tachypnea noted. No respiratory distress. He has decreased breath sounds (Poor inspiratory effort). He has no wheezes. He has no rhonchi. He has no rales.   Abdominal: Soft. Bowel sounds are normal. He exhibits distension. There is tenderness. There is no guarding.   Pitting edema noted to the chest down   Musculoskeletal: Normal range of motion. He exhibits edema and tenderness.   Neurological: He is alert and oriented to person, place, and time. He has normal strength. No sensory deficit. GCS score is 15. GCS eye subscore is 4. GCS verbal subscore is 5. GCS motor subscore is 6.   Skin: Skin is warm and dry. Capillary refill takes less than 2 seconds. No rash noted. No erythema.       ED Course   Procedures  Labs Reviewed   CBC W/ AUTO DIFFERENTIAL - Abnormal; Notable for the following components:       Result Value    RBC 2.56 (*)     Hemoglobin 6.7 (*)     Hematocrit 23.2 (*)     Mean Corpuscular Hemoglobin 26.2 (*)     Mean Corpuscular Hemoglobin Conc 28.9 (*)     RDW 23.0 (*)     Immature Granulocytes 1.6 (*)      Immature Grans (Abs) 0.15 (*)     Lymph # 0.8 (*)     Mono # 2.0 (*)     Lymph% 8.9 (*)     Mono% 21.6 (*)     All other components within normal limits   COMPREHENSIVE METABOLIC PANEL - Abnormal; Notable for the following components:    Glucose 162 (*)     BUN, Bld 91 (*)     Creatinine 2.1 (*)     Albumin 2.4 (*)     Total Bilirubin 1.7 (*)     Alkaline Phosphatase 280 (*)     eGFR if  40.3 (*)     eGFR if non  34.9 (*)     All other components within normal limits   TROPONIN I - Abnormal; Notable for the following components:    Troponin I 0.150 (*)     All other components within normal limits   B-TYPE NATRIURETIC PEPTIDE - Abnormal; Notable for the following components:     (*)     All other components within normal limits   DIGOXIN LEVEL - Abnormal; Notable for the following components:    Digoxin Lvl <0.1 (*)     All other components within normal limits   SARS-COV-2 RNA AMPLIFICATION, QUAL    Narrative:     What symptom criteria does the patient meet?->Other (specify)  Please specify:->weakness   PROTIME-INR   MAGNESIUM   ISTAT CHEM8     HO4  54-year-old male with significant past medical history of congestive heart failure, combined systolic and diastolic currently supposed to be taking Demadex, noncompliant, is compliant with his Coumadin for AFib, presenting to the emergency department for evaluation of nausea and vomiting following eating a meatball sub earlier today, however does endorse shortness of breath, has significant pitting edema bilateral lower extremities up to the level of the mid abdomen or chest, is currently on 2 L of home O2, however refusing to wear here in the emergency department, satting 96%, mildly tachycardic to 117-130, however when patient does wear his oxygen in the emergency department even 1 L his tachycardia improved to about 105, when he is off his oxygen his heart rate increases again.  Concern for possible congestive heart failure  exacerbation given that he is noncompliant on his medications, possible gastroenteritis, possible Coronavirus given his diarrhea shortness of breath and cough, possible anemia.  Bedside ultrasound shows ejection fraction consistent with his prior on 20 of 25%, does not have significant B-lines bilaterally, does have significant pitting edema on exam to level the chest.  Labs and imaging been ordered including a chest x-ray, PT INR for monitoring his Coumadin, troponin and BMP for cardiac workup, as well as a COVID test.  Lester Aly MD PGY4  05/13/2020 8:54 PM    HO4  Patient has a decreased hemoglobin at 6.7, hematocrit 23.2, will likely require transfusion, however carefully with diuresis given his congestive heart failure, creatinine is 2.1 which is consistent with patient's baseline, INR is currently subtherapeutic at 1.1, digoxin is undetectable, SARS-CoV-2 negative, chest x-ray however with signs of pulmonary edema, currently pending BNP, however anticipate admission.  I have discussed this with patient and he is amenable to admission at this time, his home hospice is not seen him for several weeks since COVID pandemic is currently a problem.  Lester Aly MD PGY4  05/13/2020 9:35 PM    HO4  Patient is amenable to admission, BP is elevated, discussed with Cardiology, recommended admission to the Medicine Service, at this time patient is admitted to the medicine service.  Lester Aly MD PGY4  05/13/2020 10:49 PM         Imaging Results          X-Ray Chest AP Portable (Final result)  Result time 05/13/20 21:42:37    Final result by Eduarda Truong MD (05/13/20 21:42:37)                 Impression:      As above.      Electronically signed by: Eduarda Truong MD  Date:    05/13/2020  Time:    21:42             Narrative:    EXAMINATION:  XR CHEST AP PORTABLE    CLINICAL HISTORY:  Suspected Covid-19 Virus Infection;    TECHNIQUE:  Single frontal view of the chest was  performed.    COMPARISON:  March 7, 2020    FINDINGS:  The cardiac silhouette remains enlarged pulmonary vascularity is not increased.  There has been some interval clearing of the abnormal opacification in the right mid and lower lung since the previous examination.  Pleural fluid has decreased at the right lung base.  Masslike opacity projected over the right lung has decreased slightly in size when compared to multiple examinations dating back to November 24, 2019.  And may represent fluid in the fissure.  No definite left-sided pleural effusion.  No pneumothorax.  Visualized osseous structures are stable.                                              Attending Attestation:   Physician Attestation Statement for Resident:  As the supervising MD   Physician Attestation Statement: I have personally seen and examined this patient.   I agree with the above history. -:   As the supervising MD I agree with the above PE.    As the supervising MD I agree with the above treatment, course, plan, and disposition.        Attending Critical Care:   Critical Care Times:   Direct Patient Care (initial evaluation, reassessments, and time considering the case)................................................................19 minutes.   Additional History from reviewing old medical records or taking additional history from the family, EMS, PCP, etc.......................4 minutes.   Ordering, Reviewing, and Interpreting Diagnostic Studies...............................................................................................................4 minutes.   Documentation..................................................................................................................................................................................3 minutes.   Consultation with other Physicians.  .................................................................................................................................................3 minutes.   ==============================================================  · Total Critical Care Time - exclusive of procedural time: 33 minutes.  ==============================================================                            Clinical Impression:       ICD-10-CM ICD-9-CM   1. Hypervolemia, unspecified hypervolemia type E87.70 276.69   2. Weakness R53.1 780.79   3. Acute on chronic combined systolic and diastolic congestive heart failure I50.43 428.43     428.0   4. Anemia, unspecified type D64.9 285.9             ED Disposition Condition    Admit                           Lester Aly MD  Resident  05/13/20 2243       Jackson Singh MD  05/13/20 [id number]

## 2020-05-14 NOTE — ASSESSMENT & PLAN NOTE
Patient presenting today with worsening shortness of breath and lower extremity edema concerning for acute decompensated heart failure. BNP on admit 334. Patient had been on dobutamine gtt recently, which was stopped due to shaking. He is unlikely adherent to medications at home as he is currently on digoxin and level was <0.1. Also on torsemide and metolazone for diuresis, which he reports does not help. He appears to understand his advanced heart failure condition and accepts that he is hospice appropriate; however, he does not appear to understand how to utilize the hospice company's resources to its maximal potential. His goals this hospitalization are to remove fluid and to get stronger with physical therapy.    TTE 11/2019 with EF 25%, global hypokinetic wall motion abnormalities, concentric LV hypertrophy    - Started patient on lasix 120mg bid for diuresis   Add metolazone or diuril if inadequate UOP  - Continue digoxin, isosorbide  - Low sodium diet  - Would consider having cardiology come back to discuss his options with him given his advanced heart failure. Prior discussions have not led to any options acceptable by patient.  - Unsure of value of palliative care involvement at this time given that patient appears to understand his diagnosis and condition  - Consulted social work for assistance in discharge planning - [facility] appears willing to have patient back home with hospice on discharge, but he may provide pushback on this

## 2020-05-14 NOTE — PT/OT/SLP EVAL
"Occupational Therapy   Evaluation/Treatment    Name: Hamlet Terrell  MRN: [id number]  Admitting Diagnosis:  Acute on chronic combined systolic and diastolic heart failure      Recommendations:     Discharge Recommendations: home health OT  Discharge Equipment Recommendations:  (TBD)  Barriers to discharge:  None    Assessment:     Hamlet Terrell is a 54 y.o. male with a medical diagnosis of Acute on chronic combined systolic and diastolic heart failure.  Performance deficits affecting function: weakness, impaired endurance, impaired self care skills, impaired functional mobilty, gait instability, impaired balance, edema, decreased lower extremity function, decreased upper extremity function, impaired cardiopulmonary response to activity, decreased safety awareness. Patient needs maximal encouragement to participate and required a lot of education on role/benefit of therapy. Patient would benefit from continued skilled acute OT 3x/wk to improve functional mobility, increase independence with ADLs, and address established goals. Recommending HHOT once medically appropriate for discharge to increase maximal independence, reduce burden of care, and ensure safety. ,    Rehab Prognosis: Fair; patient would benefit from acute skilled OT services to address these deficits and reach maximum level of function.       Plan:     Patient to be seen 3 x/week to address the above listed problems via self-care/home management, therapeutic activities, therapeutic exercises  · Plan of Care Expires: 06/14/20  · Plan of Care Reviewed with: patient    Subjective     Chief Complaint: head being cold  Patient/Family Comments/goals: "to go down the street to ride the bike"      Occupational Profile:  Living Environment: Patient lives alone in a [facility] with 3 ANAID to enter and no handrails. Patient has a tub shower combo. Patient reports he needed assistance with ADLs and functional mobility.   Equipment Used at Home:  walker, rolling, " wheelchair  Upon discharge, patient will have assistance from [facility] with [facility].    Pain/Comfort:  · Pain Rating 1: 0/10  · Pain Rating Post-Intervention 1: 0/10    Patients cultural, spiritual, Anabaptist conflicts given the current situation: no    Objective:     Communicated with: MAMI prior to session.  Patient found sitting on sofa with telemetry upon OT entry to room.    General Precautions: Standard, fall   Orthopedic Precautions:N/A   Braces: N/A     Occupational Performance:    Functional Mobility/Transfers:  · Patient completed Sit <> Stand Transfer with minimum assistance and of 2 persons  with  hand-held assist   · Functional Mobility: Patient sidestepped R<>L with CGA HHA     Activities of Daily Living:  · Upper Body Dressing: total assistance (due to desire to fix gown and place R arm through gown) Patient unable to raise or grasp using L hand)    · Lower Body Dressing: setup for donning shoes per patient report  · Toileting: setup with urinal    · Feeding: Modified Independent    Cognitive/Visual Perceptual:  Cognitive/Psychosocial Skills:     -       Oriented to: Person, Place, Time and Situation   -       Follows Commands/attention:Follows multistep  commands  -       Communication: clear/fluent  -       Memory: No Deficits noted  -       Safety awareness/insight to disability: impaired   -       Mood/Affect/Coping skills/emotional control: Appropriate to situation  Visual/Perceptual:      -Intact      Physical Exam:  Upper Extremity Range of Motion:     -       Right Upper Extremity: WFL per patient report, but would not give therapist UE  -       Left Upper Extremity: limited shoulder flexion (less than 90 degrees)  Upper Extremity Strength:    -       Right Upper Extremity: WFL per patient report, but would not give therapist UE  -       Left Upper Extremity: 2/5   Strength:    -       Right Upper Extremity: WFL  -       Left Upper Extremity: Impaired  Fine Motor  Coordination:    -       Impaired  on L hand    AMPAC 6 Click ADL:  AMPAC Total Score: 14    Treatment & Education:  Role of OT and POC  Safety  ADL retraining  Functional mobility training  Discharge planning  Importance and benefit of participating in therapy  Education:    Patient left sitting on sofa with call button in reach, nurse notified and all needs met.     GOALS:   Multidisciplinary Problems     Occupational Therapy Goals        Problem: Occupational Therapy Goal    Goal Priority Disciplines Outcome Interventions   Occupational Therapy Goal     OT, PT/OT Ongoing, Progressing    Description:  Goals to be met by: 5/28/2020     Patient will increase functional independence with ADLs by performing:    UE Dressing with Set-up Assistance.  Grooming while standing at sink with Stand-by Assistance.  Stand pivot transfers with Supervision.  Toilet transfer to toilet with Supervision.  Upper extremity exercise program 2 x 10 reps, with supervision.                      History:     Past Medical History:   Diagnosis Date    Anticoagulant long-term use     Cardiomegaly     Chronic combined systolic and diastolic congestive heart failure     Coronary artery disease     Heart attack 2006    Hypertension     Hyperthyroidism, subclinical 1/2/2013    MI (myocardial infarction) 9/22/2013    MI in 2009      Paroxysmal atrial fibrillation     PE (pulmonary embolism) 1/1/2013    IN 2010     S/P ablation of atrial flutter 2008    Stroke 2009    no residual weaknesses       Past Surgical History:   Procedure Laterality Date    COLONOSCOPY N/A 12/2/2019    Procedure: COLONOSCOPY;  Surgeon: Scott Rosario MD;  Location: [address]);  Service: Endoscopy;  Laterality: N/A;    ESOPHAGOGASTRODUODENOSCOPY N/A 12/2/2019    Procedure: EGD (ESOPHAGOGASTRODUODENOSCOPY);  Surgeon: Scott Rosario MD;  Location: [address]);  Service: Endoscopy;  Laterality: N/A;    RADIOFREQUENCY ABLATION  01/08/2008     for atrial flutter       Time Tracking:     OT Date of Treatment: 05/14/20  OT Start Time: 1137  OT Stop Time: 1153  OT Total Time (min): 16 min    Billable Minutes:Evaluation 8  Therapeutic Activity 8    DHRUV Vargas  5/14/2020

## 2020-05-14 NOTE — PLAN OF CARE
[facility]  Department of Hospital Medicine  [address] [place] [address]  [phone] after hours  [phone] fax    HOSPICE  ORDERS    05/14/2020    Admit to Hospice:  Home Service    Diagnoses:   Active Hospital Problems    Diagnosis  POA    *Acute on chronic combined systolic and diastolic heart failure [I50.43]  Yes    Weakness [R53.1]  Yes    Weakness of lower extremity [R29.898]  Yes    Atrial fibrillation, chronic [I48.20]  Yes     Chronic    Essential hypertension [I10]  Yes     Chronic      Resolved Hospital Problems   No resolved problems to display.       Hospice Qualifying Diagnoses:        Patient has a life expectancy < 6 months due to:  1) Primary Hospice Diagnosis:  End stage combined systolic and diastolic heart failure  2) Comorbid Conditions Contributing to Decline:  Afib, CAD, CKD3, h/o CVA, h/o PE    Vital Signs: Routine per Hospice Protocol.    Code Status: Full     Allergies:   Review of patient's allergies indicates:   Allergen Reactions    Acetaminophen      Itching    Oxycodone-acetaminophen      Other reaction(s): Itching    Ace inhibitors Other (See Comments)     cough       Diet: Cardiac diet    Activities: As tolerated    Nursing: Per Hospice Routine.    Oxygen: N/A    Other Miscellaneous Care: N/A    Medications:      Hamlet Terrell   Home Medication Instructions ELKIN:[id number]    Printed on:05/14/20 1328   Medication Information                      albuterol (PROVENTIL/VENTOLIN HFA) 90 mcg/actuation inhaler  Inhale 1-2 puffs into the lungs every 6 (six) hours as needed for Wheezing. Rescue             albuterol-ipratropium (DUO-NEB) 2.5 mg-0.5 mg/3 mL nebulizer solution  Take 3 mLs by nebulization every 6 (six) hours as needed for Wheezing. Rescue             apixaban (ELIQUIS) 5 mg Tab  Take 1 tablet (5 mg total) by mouth 2 (two) times daily.             aspirin (ECOTRIN) 81 MG EC tablet  Take 81 mg by mouth once  daily.             BISACODYL RECT  Place 10 mg rectally daily as needed.             digoxin (LANOXIN) 125 mcg tablet  Take 125 mcg by mouth once daily.             ferrous sulfate (FEOSOL) 325 mg (65 mg iron) Tab tablet  Take 1 tablet (325 mg total) by mouth once daily.             hydrALAZINE (APRESOLINE) 25 MG tablet  Take 1 tablet (25 mg total) by mouth 3 (three) times daily.             isosorbide dinitrate (ISORDIL) 20 MG tablet  Take 1 tablet (20 mg total) by mouth 3 (three) times daily.             lorazepam 2 mg/ml oral conc (ATIVAN) 2 mg/mL Conc  Take 2 mg by mouth every 4 (four) hours as needed.             methocarbamol (ROBAXIN) 500 MG Tab  Take 500 mg by mouth 3 (three) times daily as needed.             metOLazone (ZAROXOLYN) 10 MG tablet  Take 10 mg by mouth every other day.             morphine (MS CONTIN) 15 MG 12 hr tablet  Take 15 mg by mouth 2 (two) times daily.             nitroGLYCERIN (NITROSTAT) 0.4 MG SL tablet  Place 1 tablet (0.4 mg total) under the tongue every 5 (five) minutes as needed for Chest pain. Tablet, Sublingual Sublingual             ondansetron (ZOFRAN) 4 MG tablet  Take 1 tablet (4 mg total) by mouth every 8 (eight) hours as needed for Nausea.             oxyCODONE (ROXICODONE) 15 MG Tab  Take 1 tablet (15 mg total) by mouth every 4 (four) hours as needed for Pain.             polyethylene glycol (GLYCOLAX) 17 gram PwPk  Take by mouth once daily.             potassium chloride (KLOR-CON SPRINKLE) 10 MEQ CpSR  Take 10 mEq by mouth once daily.             senna-docusate 8.6-50 mg (PERICOLACE) 8.6-50 mg per tablet  Take 1 tablet by mouth 2 (two) times daily as needed for Constipation.             torsemide (DEMADEX) 20 MG Tab  Take 2 tablets (40 mg total) by mouth 2 (two) times daily.                   DIABETES CARE: N/A      Future Orders:  Hospice Medical Director may dictate new orders for comfortable care measures & sign death  certificate.        _________________________________  Cristofer Kincaid MD  05/14/2020

## 2020-05-14 NOTE — NURSING
Dr. Cordero informs nursing staff and pt confess to her that he came to the hospital because brother was gone and patient did not want to be home by his self

## 2020-05-14 NOTE — NURSING
Me and nightshift nurse come into Mr. Terrell room. Room is in  disarray and patient has blanket over his head. Patient doesn't answer question and ignores nurses questions and presence. Will continue to encourage patient to comply with treatment

## 2020-05-14 NOTE — PLAN OF CARE
05/14/20 0930   Post-Acute Status   Post-Acute Authorization Hospice   Hospice Status Awaiting Internal Medical Clearance     Miguel left message with Elba with [facility] to see if they will accept Pt back. Pt to d.c tomorrow.

## 2020-05-15 VITALS
HEART RATE: 95 BPM | TEMPERATURE: 98 F | BODY MASS INDEX: 37.57 KG/M2 | WEIGHT: 254.44 LBS | OXYGEN SATURATION: 99 % | RESPIRATION RATE: 20 BRPM | DIASTOLIC BLOOD PRESSURE: 71 MMHG | SYSTOLIC BLOOD PRESSURE: 129 MMHG

## 2020-05-15 PROCEDURE — 99239 HOSP IP/OBS DSCHRG MGMT >30: CPT | Mod: ,,, | Performed by: INTERNAL MEDICINE

## 2020-05-15 PROCEDURE — 99900035 HC TECH TIME PER 15 MIN (STAT)

## 2020-05-15 PROCEDURE — 99239 PR HOSPITAL DISCHARGE DAY,>30 MIN: ICD-10-PCS | Mod: ,,, | Performed by: INTERNAL MEDICINE

## 2020-05-15 NOTE — PLAN OF CARE
Transportation has been arranged through [facility] x [phone] for w/c transport to home.  Pt is on Oxygen @ 2 liters.  SW in communication w/nurse for discharge.  Requested time of ride is 10:30am. Time of transport is not guaranteed.         05/15/20 0933   Post-Acute Status   Post-Acute Authorization Other  (Home)   Hospice Status Set-up Complete  ([facility])   Other Status Awaiting f/u Appts  (.AFU)   Discharge Delays None known at this time   Discharge Plan   Discharge Plan A Hospice/home     Santos Shi LMSW  [facility] - CM Dept.

## 2020-05-15 NOTE — ASSESSMENT & PLAN NOTE
Patient presenting today with worsening shortness of breath and lower extremity edema concerning for acute decompensated heart failure. BNP on admit 334. Patient had been on dobutamine gtt recently, which was stopped due to shaking. He is unlikely adherent to medications at home as he is currently on digoxin and level was <0.1. Also on torsemide and metolazone for diuresis, which he reports does not help. He appears to understand his advanced heart failure condition and accepts that he is hospice appropriate; however, he does not appear to understand how to utilize the hospice company's resources to its maximal potential. His goals this hospitalization are to remove fluid and to get stronger with physical therapy.    5/15 Patient came to hospital as he did not feel comfortable being at home by himself as his brother who is a  was away. Patient reports he is at baseline fluid status. Breath sounds clear, JVD not elevated, b/l LE indurated edema at baseline per patient - unchanged from yesterday. Plan to discharge back to home hospice with [facility] today.    TTE 11/2019 with EF 25%, global hypokinetic wall motion abnormalities, concentric LV hypertrophy    - Started patient on lasix 120mg bid for diuresis - discontinued   Add metolazone or diuril if inadequate UOP  - Continue digoxin, isosorbide  - Low sodium diet  - Consulted social work for assistance in discharge planning - [facility] appears willing to have patient back home with hospice on discharge, but he may provide pushback on this  - Discharge home to home hospice.

## 2020-05-15 NOTE — PLAN OF CARE
Patient went home with [facility]. Transport was set up by  through [facility].      05/15/20 1117   Final Note   Assessment Type Final Discharge Note   Anticipated Discharge Disposition [facility] Follow Up  Appt(s) scheduled? Yes   Discharge plans and expectations educations in teach back method with documentation complete? Yes   Right Care Referral Info   Post Acute Recommendation Home-care   Referral Type Hospice    Post-Acute Status   Post-Acute Authorization Hospice   Hospice Status Set-up Complete     Anne Marie Hernandez RN, CM   Ext: [phone]

## 2020-05-15 NOTE — PLAN OF CARE
[facility]  Department of Hospital Medicine  [address] [place] [address]  [phone] after hours  [phone] fax     HOSPICE  ORDERS     05/15/2020     Admit to Hospice:  Home Service     Diagnoses:          Active Hospital Problems     Diagnosis   POA    *Acute on chronic combined systolic and diastolic heart failure [I50.43]   Yes    Weakness [R53.1]   Yes    Weakness of lower extremity [R29.898]   Yes    Atrial fibrillation, chronic [I48.20]   Yes       Chronic    Essential hypertension [I10]   Yes       Chronic       Resolved Hospital Problems   No resolved problems to display.         Hospice Qualifying Diagnoses:        Patient has a life expectancy < 6 months due to:  1. Primary Hospice Diagnosis:  End stage combined systolic and diastolic heart failure  2. Comorbid Conditions Contributing to Decline:  Afib, CAD, CKD3, h/o CVA, h/o PE     Vital Signs: Routine per Hospice Protocol.     Code Status: Full      Allergies:         Review of patient's allergies indicates:   Allergen Reactions    Acetaminophen         Itching    Oxycodone-acetaminophen         Other reaction(s): Itching    Ace inhibitors Other (See Comments)       cough         Diet: Cardiac diet     Activities: As tolerated     Nursing: Per Hospice Routine.     Oxygen: N/A     Other Miscellaneous Care: N/A     Medications:    Hamlet Terrell   Home Medication Instructions ELKIN:[id number]    Printed on:05/15/20 8857   Medication Information                      albuterol (PROVENTIL/VENTOLIN HFA) 90 mcg/actuation inhaler  Inhale 1-2 puffs into the lungs every 6 (six) hours as needed for Wheezing. Rescue             albuterol-ipratropium (DUO-NEB) 2.5 mg-0.5 mg/3 mL nebulizer solution  Take 3 mLs by nebulization every 6 (six) hours as needed for Wheezing. Rescue             apixaban (ELIQUIS) 5 mg Tab  Take 1 tablet (5 mg total) by mouth 2 (two) times daily.             aspirin (ECOTRIN) 81  MG EC tablet  Take 81 mg by mouth once daily.             BISACODYL RECT  Place 10 mg rectally daily as needed.             digoxin (LANOXIN) 125 mcg tablet  Take 125 mcg by mouth once daily.             ferrous sulfate (FEOSOL) 325 mg (65 mg iron) Tab tablet  Take 1 tablet (325 mg total) by mouth once daily.             hydrALAZINE (APRESOLINE) 25 MG tablet  Take 1 tablet (25 mg total) by mouth 3 (three) times daily.             isosorbide dinitrate (ISORDIL) 20 MG tablet  Take 1 tablet (20 mg total) by mouth 3 (three) times daily.             lorazepam 2 mg/ml oral conc (ATIVAN) 2 mg/mL Conc  Take 2 mg by mouth every 4 (four) hours as needed.             methocarbamol (ROBAXIN) 500 MG Tab  Take 500 mg by mouth 3 (three) times daily as needed.             metOLazone (ZAROXOLYN) 10 MG tablet  Take 10 mg by mouth every other day.             morphine (MS CONTIN) 15 MG 12 hr tablet  Take 15 mg by mouth 2 (two) times daily.             nitroGLYCERIN (NITROSTAT) 0.4 MG SL tablet  Place 1 tablet (0.4 mg total) under the tongue every 5 (five) minutes as needed for Chest pain. Tablet, Sublingual Sublingual             ondansetron (ZOFRAN) 4 MG tablet  Take 1 tablet (4 mg total) by mouth every 8 (eight) hours as needed for Nausea.             oxyCODONE (ROXICODONE) 15 MG Tab  Take 1 tablet (15 mg total) by mouth every 4 (four) hours as needed for Pain.             polyethylene glycol (GLYCOLAX) 17 gram PwPk  Take by mouth once daily.             potassium chloride (KLOR-CON SPRINKLE) 10 MEQ CpSR  Take 10 mEq by mouth once daily.             senna-docusate 8.6-50 mg (PERICOLACE) 8.6-50 mg per tablet  Take 1 tablet by mouth 2 (two) times daily as needed for Constipation.             torsemide (DEMADEX) 20 MG Tab  Take 2 tablets (40 mg total) by mouth 2 (two) times daily.                      DIABETES CARE: N/A        Future Orders:  Hospice Medical Director may dictate new orders for comfortable care measures & sign death  certificate.           _________________________________  Cristofer Kincaid MD  05/15/2020

## 2020-05-15 NOTE — DISCHARGE SUMMARY
[facility] Hospital Medicine  Discharge Summary      Patient Name: Hamlet Terrell  MRN: [id number]  Admission Date: 5/13/2020  Hospital Length of Stay: 2 days  Discharge Date and Time:  05/15/2020 11:15 AM  Attending Physician: No att. providers found   Discharging Provider: Cristofer Kincaid MD  Primary Care Provider: Carlin Swift MD  Hospital Medicine Team: [facility] HOSP MED 2 Cristofer Kincaid MD    HPI:   Mr. Terrell is a 54M with chronic combined systolic and diastolic heart failure (EF 25%) on digoxin, afib on coumadin, CAD, CKD3, history of CVA, and history of PE here for shortness of breath and lower extremity edema. Patient has been admitted multiple times in the past for similar complaints. He was recently re-admitted to home hospice with [facility] on 5/8/2020. He complains that they have not been by to see him in weeks and is upset because he had a recent fall and was on the floor for over 5 hours. He ate a meatball sub today and choked on a piece of meat with subsequent emesis. No episodes of vomiting since. He is a poor historian, is unable to recite what medications he is on but reports adherence as he uses a pill box. He was previously on a dobutamine drip, which was stopped because it gave him shakes. Per patient, torsemide 40mg bid does not help control his edema at home. When asked about the goals of this hospitalization and what his proposed discharge plans are, he reports that he wants to get the fluid off and to feel stronger with physical therapy. He understands that he has advanced heart failure and prior notes have indicated that he is not open to further options for management of this heart failure.    [facility] was contacted to clarify some history. He was recently readmitted 5/8/2020 had a face visit on day of readmission. Since then, he has had multiple virtual visits, including one with the , nurse, and  day prior to admit here at [facility]. He did not  call to notify the hospice about his fall nor did he notify them about coming in today.    On arrival to the ED, patient was short of breath, particularly on exertion. Hgb at 6.7, last Hgb >7 was in 1/2020. INR was 1.1, digoxin level <0.1. S/p 1x lasix 80mg dose with 500cc UOP. Patient admitted to hospital medicine for management of acute on chronic heart failure.    * No surgery found *      Hospital Course:   Patient appears euvolemic, breath sounds clear, minimal edema in LEs. Upon further history with Dr Cordero, patient reveals that he came to the hospital because his brother was away and he did not feel uncomfortable being at home     Consults:   Consults (From admission, onward)        Status Ordering Provider     Inpatient consult to Social Work  Once     Provider:  (Not yet assigned)    Acknowledged ROLANDO [facility]        Physical Exam   Constitutional: He is oriented to person, place, and time. He appears well-developed.   HENT:   Head: Normocephalic and atraumatic.   Eyes: Conjunctivae and EOM are normal. No scleral icterus.   Neck: Normal range of motion. Neck supple.   Cardiovascular: Normal rate and regular rhythm.   No murmur heard.  Pulmonary/Chest: Breath sounds normal. He has no wheezes. He has no rales.   Abdominal: Soft. Bowel sounds are normal. He exhibits no distension. There is no guarding.   Musculoskeletal: He exhibits edema (2+ BLE to knees) and tenderness.   Neurological: He is alert and oriented to person, place, and time.   Skin: Skin is warm. He is not diaphoretic. No erythema.   Trophic changes on bilateral LE   Nursing note and vitals reviewed.        CRANIAL NERVES      CN III, IV, VI   Extraocular motions are normal.     * Acute on chronic combined systolic and diastolic heart failure  Patient presenting today with worsening shortness of breath and lower extremity edema concerning for acute decompensated heart failure. BNP on admit 334. Patient had been on dobutamine gtt  recently, which was stopped due to shaking. He is unlikely adherent to medications at home as he is currently on digoxin and level was <0.1. Also on torsemide and metolazone for diuresis, which he reports does not help. He appears to understand his advanced heart failure condition and accepts that he is hospice appropriate; however, he does not appear to understand how to utilize the hospice company's resources to its maximal potential. His goals this hospitalization are to remove fluid and to get stronger with physical therapy.    5/15 Patient came to hospital as he did not feel comfortable being at home by himself as his brother who is a  was away. Patient reports he is at baseline fluid status. Breath sounds clear, JVD not elevated, b/l LE indurated edema at baseline per patient - unchanged from yesterday. Plan to discharge back to home hospice with [facility] today.    TTE 11/2019 with EF 25%, global hypokinetic wall motion abnormalities, concentric LV hypertrophy    - Started patient on lasix 120mg bid for diuresis - discontinued   Add metolazone or diuril if inadequate UOP  - Continue digoxin, isosorbide  - Low sodium diet  - Consulted social work for assistance in discharge planning - [facility] appears willing to have patient back home with hospice on discharge, but he may provide pushback on this  - Discharge home to home hospice.     Weakness of lower extremity  - PT/OT      Essential hypertension  - Continue home isosorbide dinitrate  - Continue home hydralazine on discharge    Atrial fibrillation, chronic  CHADSVASC 5  On warfarin at home, but INR subtherapeutic at 1.1 on admit    - Started on eliquis rather than warfarin for discharge  - Cardiac monitoring      Final Active Diagnoses:    Diagnosis Date Noted POA    PRINCIPAL PROBLEM:  Acute on chronic combined systolic and diastolic heart failure [I50.43] 04/22/2019 Yes    Weakness [R53.1] 05/13/2020 Yes    Weakness of lower  extremity [R29.898] 07/03/2019 Yes    Atrial fibrillation, chronic [I48.20] 01/02/2013 Yes     Chronic    Essential hypertension [I10] 01/02/2013 Yes     Chronic      Problems Resolved During this Admission:       Discharged Condition: stable    Disposition: Hospice/Home    Follow Up:  Follow-up Information     Carlin Swift MD On 5/21/2020.    Specialty:  Family Medicine  Why:  hospital follow up in a week. Please got to walk-in clinic within a week.   Contact information:  [address]  [place] [address] [phone]                 Patient Instructions:      Ambulatory referral/consult to Physical/Occupational Therapy   Standing Status: Future   Referral Priority: Routine Referral Type: Physical Medicine   Referral Reason: Specialty Services Required   Number of Visits Requested: 1       Significant Diagnostic Studies: All labs in last 24 hours reviewed    Pending Diagnostic Studies:     None         Medications:  Reconciled Home Medications:      Medication List      START taking these medications    ELIQUIS 5 mg Tab  Generic drug:  apixaban  Take 1 tablet (5 mg total) by mouth 2 (two) times daily.        CHANGE how you take these medications    morphine 15 MG 12 hr tablet  Commonly known as:  MS CONTIN  Take 15 mg by mouth 2 (two) times daily.  What changed:  Another medication with the same name was removed. Continue taking this medication, and follow the directions you see here.        CONTINUE taking these medications    albuterol 90 mcg/actuation inhaler  Commonly known as:  PROVENTIL/VENTOLIN HFA  Inhale 1-2 puffs into the lungs every 6 (six) hours as needed for Wheezing. Rescue     albuterol-ipratropium 2.5 mg-0.5 mg/3 mL nebulizer solution  Commonly known as:  DUO-NEB  Take 3 mLs by nebulization every 6 (six) hours as needed for Wheezing. Rescue     aspirin 81 MG EC tablet  Commonly known as:  ECOTRIN  Take 81 mg by mouth once daily.     BISACODYL RECT  Place 10 mg rectally daily  as needed.     digoxin 125 mcg tablet  Commonly known as:  LANOXIN  Take 125 mcg by mouth once daily.     ferrous sulfate 325 mg (65 mg iron) Tab tablet  Commonly known as:  FEOSOL  Take 1 tablet (325 mg total) by mouth once daily.     hydrALAZINE 25 MG tablet  Commonly known as:  APRESOLINE  Take 1 tablet (25 mg total) by mouth 3 (three) times daily.     isosorbide dinitrate 20 MG tablet  Commonly known as:  ISORDIL  Take 1 tablet (20 mg total) by mouth 3 (three) times daily.     KLOR-CON SPRINKLE 10 MEQ Cpsr  Generic drug:  potassium chloride  Take 10 mEq by mouth once daily.     lorazepam 2 mg/ml oral conc 2 mg/mL Conc  Commonly known as:  ATIVAN  Take 2 mg by mouth every 4 (four) hours as needed.     methocarbamoL 500 MG Tab  Commonly known as:  ROBAXIN  Take 500 mg by mouth 3 (three) times daily as needed.     metOLazone 10 MG tablet  Commonly known as:  ZAROXOLYN  Take 10 mg by mouth every other day.     nitroGLYCERIN 0.4 MG SL tablet  Commonly known as:  NITROSTAT  Place 1 tablet (0.4 mg total) under the tongue every 5 (five) minutes as needed for Chest pain. Tablet, Sublingual Sublingual     ondansetron 4 MG tablet  Commonly known as:  ZOFRAN  Take 1 tablet (4 mg total) by mouth every 8 (eight) hours as needed for Nausea.     oxyCODONE 15 MG Tab  Commonly known as:  ROXICODONE  Take 1 tablet (15 mg total) by mouth every 4 (four) hours as needed for Pain.     polyethylene glycol 17 gram Pwpk  Commonly known as:  GLYCOLAX  Take by mouth once daily.     senna-docusate 8.6-50 mg 8.6-50 mg per tablet  Commonly known as:  PERICOLACE  Take 1 tablet by mouth 2 (two) times daily as needed for Constipation.     torsemide 20 MG Tab  Commonly known as:  DEMADEX  Take 2 tablets (40 mg total) by mouth 2 (two) times daily.        STOP taking these medications    warfarin 6 MG tablet  Commonly known as:  COUMADIN            Indwelling Lines/Drains at time of discharge:   Lines/Drains/Airways     None                 Time  spent on the discharge of patient: 30 minutes  Patient was seen and examined on the date of discharge and determined to be suitable for discharge.         Cristofer Kincaid MD  Department of Hospital Medicine  [facility]

## 2020-05-15 NOTE — CARE UPDATE
Rapid Response Nurse Chart Check     Chart check completed, abnormal VS noted. Bedside RN, Yoon [phone] contacted, no concerns verbalized at this time, instructed to call [phone] for further concerns or assistance.

## 2020-05-15 NOTE — PLAN OF CARE
Pt remain free from fall and injuries. Stayed up in his recliner throughout the night, denies to sleep in the bed, Pt declines waffle mattress & bariatric bed  placed. At times pt is noncompliant with treatment. Safety maintained. Will monitor.

## 2020-05-18 LAB
BLD PROD TYP BPU: NORMAL
BLOOD UNIT EXPIRATION DATE: NORMAL
BLOOD UNIT TYPE CODE: 8400
BLOOD UNIT TYPE: NORMAL
CODING SYSTEM: NORMAL
DISPENSE STATUS: NORMAL
NUM UNITS TRANS PACKED RBC: NORMAL

## 2021-01-19 NOTE — SUBJECTIVE & OBJECTIVE
Interval History: Pt seen and examined at bedside. CVC placed overnight. BUN today 140s and patient displaying some confusion. UO of 4.2 L past 24 hrs.     Review of patient's allergies indicates:   Allergen Reactions    Acetaminophen      Itching    Oxycodone-acetaminophen      Other reaction(s): Itching    Ace inhibitors Other (See Comments)     cough     Current Facility-Administered Medications   Medication Frequency    0.9%  NaCl infusion (for blood administration) Q24H PRN    0.9%  NaCl infusion (for blood administration) Q24H PRN    0.9%  NaCl infusion PRN    0.9%  NaCl infusion Once    acetaminophen tablet 650 mg Q4H PRN    aspirin EC tablet 81 mg Daily    dextrose 10% (D10W) Bolus PRN    dextrose 10% (D10W) Bolus PRN    diphenhydrAMINE capsule 25 mg Q6H PRN    DOBUTamine 500mg in D5W 250mL infusion (premix) (NON-TITRATING) Continuous    ferrous sulfate EC tablet 325 mg Daily    furosemide (LASIX) 2 mg/mL in sodium chloride 0.9% 100 mL infusion (conc: 2 mg/mL) Continuous    glucagon (human recombinant) injection 1 mg PRN    glucose chewable tablet 16 g PRN    glucose chewable tablet 24 g PRN    isosorbide-hydrALAZINE 20-37.5 mg per tablet 1 tablet BID    lidocaine 5 % patch 1 patch Q24H    melatonin tablet 6 mg Nightly PRN    methocarbamol tablet 500 mg TID PRN    methyl salicylate-menthol 15-10% cream TID    metOLazone tablet 10 mg BID    morphine injection 4 mg Q6H PRN    ondansetron injection 8 mg Q8H PRN    oxyCODONE immediate release tablet 15 mg Q4H PRN    potassium chloride 10% oral solution 20 mEq Once    predniSONE tablet 40 mg Daily    promethazine (PHENERGAN) 25 mg in dextrose 5 % 50 mL IVPB Q6H PRN    senna-docusate 8.6-50 mg per tablet 1 tablet BID PRN    sodium chloride 0.65 % nasal spray 1 spray PRN    sodium chloride 0.9% flush 10 mL PRN    sodium chloride 0.9% flush 10 mL PRN    sodium chloride 0.9% flush 5 mL PRN       Objective:     Vital Signs (Most  Recent):  Temp: 97.4 °F (36.3 °C) (01/12/20 0806)  Pulse: (!) 111 (01/12/20 0806)  Resp: 18 (01/12/20 0806)  BP: 127/65 (01/12/20 0806)  SpO2: (!) 89 % (01/12/20 0806)  O2 Device (Oxygen Therapy): room air (01/12/20 0806) Vital Signs (24h Range):  Temp:  [97.4 °F (36.3 °C)-98.8 °F (37.1 °C)] 97.4 °F (36.3 °C)  Pulse:  [] 111  Resp:  [16-20] 18  SpO2:  [89 %-99 %] 89 %  BP: (118-134)/(56-82) 127/65     Weight: 125.5 kg (276 lb 10.8 oz) (01/11/20 1124)  Body mass index is 40.86 kg/m².  Body surface area is 2.47 meters squared.    I/O last 3 completed shifts:  In: 2196.7 [P.O.:1870; I.V.:326.7]  Out: 4930 [Urine:4930]    Physical Exam   Constitutional: He is oriented to person, place, and time. No distress.   Sick appearing male   HENT:   Head: Normocephalic and atraumatic.   Right Ear: External ear normal.   Left Ear: External ear normal.   Nose: Nose normal.   Eyes: Pupils are equal, round, and reactive to light. EOM are normal. No scleral icterus.   Neck: JVD present. No tracheal deviation present.   Cardiovascular: Normal rate, regular rhythm and intact distal pulses. Exam reveals gallop.   Pulses:       Radial pulses are 2+ on the right side, and 2+ on the left side.   Pulmonary/Chest: Effort normal. No stridor. He has no wheezes. He has rales (bibasilar).   Abdominal: Soft. There is no tenderness. There is no guarding.   Musculoskeletal: He exhibits edema and tenderness.   Neurological: He is alert and oriented to person, place, and time.   asterixis   Skin: Skin is warm and dry. Rash (chronic venous stasis ulceration to the lower extremities bilaterally) noted. He is not diaphoretic.   Psychiatric: He has a normal mood and affect. His behavior is normal.   Nursing note and vitals reviewed.      Significant Labs:  CBC:   Recent Labs   Lab 01/12/20  0348   WBC 9.05   RBC 2.54*   HGB 6.3*   HCT 21.8*      MCV 86   MCH 24.8*   MCHC 28.9*     CMP:   Recent Labs   Lab 01/12/20  0348   *   CALCIUM  10.3   ALBUMIN 3.3*   PROT 8.5*      K 3.4*   CO2 37*   CL 86*   *   CREATININE 2.7*   ALKPHOS 203*   ALT 17   AST 27   BILITOT 1.7*     All labs within the past 24 hours have been reviewed.        High Dose Vitamin A Counseling: Side effects reviewed, pt to contact office should one occur. Spironolactone Counseling: Patient advised regarding risks of diarrhea, abdominal pain, hyperkalemia, birth defects (for female patients), liver toxicity and renal toxicity. The patient may need blood work to monitor liver and kidney function and potassium levels while on therapy. The patient verbalized understanding of the proper use and possible adverse effects of spironolactone.  All of the patient's questions and concerns were addressed. Topical Sulfur Applications Pregnancy And Lactation Text: This medication is Pregnancy Category C and has an unknown safety profile during pregnancy. It is unknown if this topical medication is excreted in breast milk. Spironolactone Pregnancy And Lactation Text: This medication can cause feminization of the male fetus and should be avoided during pregnancy. The active metabolite is also found in breast milk. Isotretinoin Counseling: Patient should get monthly blood tests, not donate blood, not drive at night if vision affected, not share medication, and not undergo elective surgery for 6 months after tx completed. Side effects reviewed, pt to contact office should one occur. Benzoyl Peroxide Counseling: Patient counseled that medicine may cause skin irritation and bleach clothing.  In the event of skin irritation, the patient was advised to reduce the amount of the drug applied or use it less frequently.   The patient verbalized understanding of the proper use and possible adverse effects of benzoyl peroxide.  All of the patient's questions and concerns were addressed. Topical Sulfur Applications Counseling: Topical Sulfur Counseling: Patient counseled that this medication may cause skin irritation or allergic reactions.  In the event of skin irritation, the patient was advised to reduce the amount of the drug applied or use it less frequently.   The patient verbalized understanding of the proper use and possible adverse effects of topical sulfur application.  All of the patient's questions and concerns were addressed. Bactrim Pregnancy And Lactation Text: This medication is Pregnancy Category D and is known to cause fetal risk.  It is also excreted in breast milk. Include Pregnancy/Lactation Warning?: No Detail Level: Zone Isotretinoin Pregnancy And Lactation Text: This medication is Pregnancy Category X and is considered extremely dangerous during pregnancy. It is unknown if it is excreted in breast milk. Topical Clindamycin Pregnancy And Lactation Text: This medication is Pregnancy Category B and is considered safe during pregnancy. It is unknown if it is excreted in breast milk. Minocycline Counseling: Patient advised regarding possible photosensitivity and discoloration of the teeth, skin, lips, tongue and gums.  Patient instructed to avoid sunlight, if possible.  When exposed to sunlight, patients should wear protective clothing, sunglasses, and sunscreen.  The patient was instructed to call the office immediately if the following severe adverse effects occur:  hearing changes, easy bruising/bleeding, severe headache, or vision changes.  The patient verbalized understanding of the proper use and possible adverse effects of minocycline.  All of the patient's questions and concerns were addressed. Minocycline Pregnancy And Lactation Text: This medication is Pregnancy Category D and not consider safe during pregnancy. It is also excreted in breast milk. Sarecycline Counseling: Patient advised regarding possible photosensitivity and discoloration of the teeth, skin, lips, tongue and gums.  Patient instructed to avoid sunlight, if possible.  When exposed to sunlight, patients should wear protective clothing, sunglasses, and sunscreen.  The patient was instructed to call the office immediately if the following severe adverse effects occur:  hearing changes, easy bruising/bleeding, severe headache, or vision changes.  The patient verbalized understanding of the proper use and possible adverse effects of sarecycline.  All of the patient's questions and concerns were addressed. Doxycycline Pregnancy And Lactation Text: This medication is Pregnancy Category D and not consider safe during pregnancy. It is also excreted in breast milk but is considered safe for shorter treatment courses. Benzoyl Peroxide Pregnancy And Lactation Text: This medication is Pregnancy Category C. It is unknown if benzoyl peroxide is excreted in breast milk. Topical Retinoid Pregnancy And Lactation Text: This medication is Pregnancy Category C. It is unknown if this medication is excreted in breast milk. Birth Control Pills Pregnancy And Lactation Text: This medication should be avoided if pregnant and for the first 30 days post-partum. Topical Retinoid counseling:  Patient advised to apply a pea-sized amount only at bedtime and wait 30 minutes after washing their face before applying.  If too drying, patient may add a non-comedogenic moisturizer. The patient verbalized understanding of the proper use and possible adverse effects of retinoids.  All of the patient's questions and concerns were addressed. Erythromycin Pregnancy And Lactation Text: This medication is Pregnancy Category B and is considered safe during pregnancy. It is also excreted in breast milk. Birth Control Pills Counseling: Birth Control Pill Counseling: I discussed with the patient the potential side effects of OCPs including but not limited to increased risk of stroke, heart attack, thrombophlebitis, deep venous thrombosis, hepatic adenomas, breast changes, GI upset, headaches, and depression.  The patient verbalized understanding of the proper use and possible adverse effects of OCPs. All of the patient's questions and concerns were addressed. Dapsone Counseling: I discussed with the patient the risks of dapsone including but not limited to hemolytic anemia, agranulocytosis, rashes, methemoglobinemia, kidney failure, peripheral neuropathy, headaches, GI upset, and liver toxicity.  Patients who start dapsone require monitoring including baseline LFTs and weekly CBCs for the first month, then every month thereafter.  The patient verbalized understanding of the proper use and possible adverse effects of dapsone.  All of the patient's questions and concerns were addressed. Azithromycin Pregnancy And Lactation Text: This medication is considered safe during pregnancy and is also secreted in breast milk. Dapsone Pregnancy And Lactation Text: This medication is Pregnancy Category C and is not considered safe during pregnancy or breast feeding. Azithromycin Counseling:  I discussed with the patient the risks of azithromycin including but not limited to GI upset, allergic reaction, drug rash, diarrhea, and yeast infections. Bactrim Counseling:  I discussed with the patient the risks of sulfa antibiotics including but not limited to GI upset, allergic reaction, drug rash, diarrhea, dizziness, photosensitivity, and yeast infections.  Rarely, more serious reactions can occur including but not limited to aplastic anemia, agranulocytosis, methemoglobinemia, blood dyscrasias, liver or kidney failure, lung infiltrates or desquamative/blistering drug rashes. Doxycycline Counseling:  Patient counseled regarding possible photosensitivity and increased risk for sunburn.  Patient instructed to avoid sunlight, if possible.  When exposed to sunlight, patients should wear protective clothing, sunglasses, and sunscreen.  The patient was instructed to call the office immediately if the following severe adverse effects occur:  hearing changes, easy bruising/bleeding, severe headache, or vision changes.  The patient verbalized understanding of the proper use and possible adverse effects of doxycycline.  All of the patient's questions and concerns were addressed. Tazorac Counseling:  Patient advised that medication is irritating and drying.  Patient may need to apply sparingly and wash off after an hour before eventually leaving it on overnight.  The patient verbalized understanding of the proper use and possible adverse effects of tazorac.  All of the patient's questions and concerns were addressed. Tazorac Pregnancy And Lactation Text: This medication is not safe during pregnancy. It is unknown if this medication is excreted in breast milk. High Dose Vitamin A Pregnancy And Lactation Text: High dose vitamin A therapy is contraindicated during pregnancy and breast feeding. Erythromycin Counseling:  I discussed with the patient the risks of erythromycin including but not limited to GI upset, allergic reaction, drug rash, diarrhea, increase in liver enzymes, and yeast infections. Topical Clindamycin Counseling: Patient counseled that this medication may cause skin irritation or allergic reactions.  In the event of skin irritation, the patient was advised to reduce the amount of the drug applied or use it less frequently.   The patient verbalized understanding of the proper use and possible adverse effects of clindamycin.  All of the patient's questions and concerns were addressed. Tetracycline Counseling: Patient counseled regarding possible photosensitivity and increased risk for sunburn.  Patient instructed to avoid sunlight, if possible.  When exposed to sunlight, patients should wear protective clothing, sunglasses, and sunscreen.  The patient was instructed to call the office immediately if the following severe adverse effects occur:  hearing changes, easy bruising/bleeding, severe headache, or vision changes.  The patient verbalized understanding of the proper use and possible adverse effects of tetracycline.  All of the patient's questions and concerns were addressed. Patient understands to avoid pregnancy while on therapy due to potential birth defects.

## 2021-11-15 NOTE — ASSESSMENT & PLAN NOTE
Per primary team   .  During attempted bedside trach placement  -daughter is struggling to understand how this complication occurred  -explained that the sudden change in expected prognosis is due to the sequelae of cardiac arrest (renal failure, cerebral dysfxn, etc)

## 2022-04-21 NOTE — CONSULTS
"20g x 1 3/4" PIV placed to RFA under U/S guidance.  " Update History & Physical    The Patient's History and Physical of April 15,   22 was reviewed with the patient and I examined the patient. There was no change. The surgical site was confirmed by the patient and me. Plan:  The risk, benefits, expected outcome, and alternative to the recommended procedure have been discussed with the patient. Patient understands and wants to proceed with the procedure.     Electronically signed by CHRISTINA Peña on 4/21/2022 at 9:36 AM

## 2022-08-17 NOTE — PROGRESS NOTES
[facility]  Nephrology  Progress Note    Patient Name: Hamlet Terrell  MRN: [id number]  Admission Date: 12/24/2019  Hospital Length of Stay: 16 days  Attending Provider: Antoinette Goins MD   Primary Care Physician: Carlin Swift MD  Principal Problem:Acute on chronic combined systolic and diastolic congestive heart failure    Subjective:     HPI: The pt is a 53 yo M with chronic combined HF (EF 25% on 11/27), atrial fibrillation on chronic AC (coumadin), CAD, CVA, and history of PE that presented to the ED with SOB & edema. Nephrology was consulted for assistance with management of cardiorenal syndrome in the setting of JEAN-PIERRE on CKD III now resistive to diuretic management. Baseline Cr appears to be 1.6-1.9. Cr elevated to 3.1 at time of consult. Of note, he was recently admitted to Hospital Medicine from 11/24-12/8 for a CHF exacerbation.  During that admission, he was evaluated by Cardiology who started him on Dobutamine and Diuril.  He was DC'ed home on Lasix 80 mg po BID.  He reports that he was compliant with this regimen, but despite this, he had continued to gain weight, have lower extremity and abdominal swelling, as well as shortness of breath. He was discharged with a weight of 259lbs, and claims that is around his dry weight. He is currently 283 lbs.       Per Dr. Goins:   Patient started on IV diuretics and  infusion for diagnosis of acute on chronic combined heart failure. His weight was stagnant and UOP tapered, so he was transitioned to IVP lasix for IV lasix gtt as this was actually effective during prior admission under guidance from co-management cardiology. However, the patient actually worsened symptomatically and TBili increased along with Cr and weight increase. The patient is now started back on lasix infusion with diuril augmentation. The patient's course is also c/b epistaxis, possibly related to his decompensated picture. He has required pRBC transfusion. He refuses to  apply pressure to the bridge of his nose as he says he cannot breathe through his mouth.  The patient's course is also c/b fever, likely due to pRBC transfusion.     Cardiology consulted for diuretic resistance. Despite two-week hospital stay thus far, he is only down 3 lbs. Nephrology consulted for acute renal failure and consideration of temporary HD for volume removal. Pt continues to exhibit poor insight into his disease. His wishes (to get healthy, ride his bike, walk to the store, return to the hospital if needed) is not on par with hospice. He reported similar feelings to Palliative Care yesterday. Discussed with Palliative Care provider. He is however now amenable for home dobutamine. PICC placement will likely be an issue given his current renal failure. Hb 6.9 secondary to renal failure; pt would like to hold off on transfusion today.    Interval History:     Patient sitting up in chair. Appears tired and states that he is sleepy. Still states he wants more time to consider dialysis and potential central line for access. Reassuring that his urine output is improved on current regimen.     Review of Systems   Constitutional: Negative for chills and fever.   HENT: Negative for congestion and rhinorrhea.    Eyes: Negative for photophobia and visual disturbance.   Respiratory: Positive for shortness of breath. Negative for wheezing.    Cardiovascular: Positive for leg swelling. Negative for chest pain and palpitations.   Gastrointestinal: Negative for nausea and vomiting.   Genitourinary: Negative for difficulty urinating (improved), dysuria, frequency and urgency.   Musculoskeletal: Positive for back pain.   Skin: Positive for rash (lower extremities, chronic venous stasis). Negative for pallor.   Neurological: Negative for dizziness and headaches.   Psychiatric/Behavioral: Negative for agitation and behavioral problems.     Objective:     Vital Signs (Most Recent):  Temp: 98.5 °F (36.9 °C) (01/09/20  0521)  Pulse: (!) 128 (01/09/20 0715)  Resp: 16 (01/09/20 0521)  BP: 123/72 (01/09/20 0521)  SpO2: 95 % (01/09/20 0521)  O2 Device (Oxygen Therapy): room air (01/08/20 0800) Vital Signs (24h Range):  Temp:  [96.2 °F (35.7 °C)-98.5 °F (36.9 °C)] 98.5 °F (36.9 °C)  Pulse:  [] 128  Resp:  [16-18] 16  SpO2:  [91 %-95 %] 95 %  BP: (120-135)/(63-77) 123/72     Weight: 127.3 kg (280 lb 10.3 oz) (01/09/20 0534)  Body mass index is 41.44 kg/m².  Body surface area is 2.49 meters squared.    I/O last 3 completed shifts:  In: 1257.6 [P.O.:1020; I.V.:237.6]  Out: 3680 [Urine:3680]    Physical Exam   Constitutional: He is oriented to person, place, and time. No distress.   Sick appearing male   HENT:   Head: Normocephalic and atraumatic.   Right Ear: External ear normal.   Left Ear: External ear normal.   Nose: Nose normal.   Eyes: Pupils are equal, round, and reactive to light. EOM are normal. No scleral icterus.   Neck: JVD present. No tracheal deviation present.   Cardiovascular: Normal rate, regular rhythm and intact distal pulses. Exam reveals gallop.   Pulses:       Radial pulses are 2+ on the right side, and 2+ on the left side.   Pulmonary/Chest: Effort normal. No stridor. He has no wheezes. He has rales (bibasilar).   Abdominal: Soft. There is no tenderness. There is no guarding.   Musculoskeletal: He exhibits edema and tenderness.   Neurological: He is alert and oriented to person, place, and time.   Negative for asterixis, no tremor noted    Skin: Skin is warm and dry. Rash (chronic venous stasis ulceration to the lower extremities bilaterally) noted. He is not diaphoretic.   Psychiatric: He has a normal mood and affect. His behavior is normal.   Nursing note and vitals reviewed.      Significant Labs:  All labs within the past 24 hours have been reviewed.        Assessment/Plan:     * Acute on chronic combined systolic and diastolic congestive heart failure  27 yo male with HFrEF (25%), CKD 3, and multiple  hospital admissions for heart failure exacerbations presenting this admission for acute on chronic heart failure. Despite aggressive diuresis with Lasix 120mg TID, decreased urinary output. He was later transitioned to lasix gtt at 30 mg/hr with multiple doses of diuril without improvement in UPO. Nephrology consult for volume overload refractory to diuresis.    Plan/Recommendation:  - Continue current regimen with lasix infusion at 40 mg/hr  - Continue metolazone 10mg BID   - Would recommend continued medical management   - Continue to monitor strict In/outs, daily weights  - Will continue to follow pending discussions between patient and palliative care/hospice  - Patient states he needs more time to consider trialysis placement for potential HD- will follow closely and touch base with him daily      Cardiorenal syndrome  Plan/Recommendation:  - Continue current regimen with lasix infusion at 40 mg/hr  - Continue metolazone 10mg BID   - Would recommend continued medical management   - Continue to monitor strict In/outs, daily weights  - Will continue to follow pending discussions between patient and palliative care/hospice       Low threshold to dialyze emergently if necessary- currently does not need emergent dialysis     Palliative care encounter  Palliative care and hospice currently following   - Ongoing goals of care discussion- states he would be okay with dialysis while inpatient, but does not want to pursue dialysis outpatient, discussed trialysis line placement- patient states he needs more time to consider      Stage 3 chronic kidney disease  - JEAN-PIERRE on CKD III 2/2 cardiorenal syndrome   - Creatinine up trending since admission, baseline 1.6-1.9, currently 3.0           Thank you for your consult. I will follow-up with patient. Please contact us if you have any additional questions.    Harris Zarco, DO  Nephrology  [facility]   verbal cues/1 person assist

## 2022-09-09 NOTE — ASSESSMENT & PLAN NOTE
Continue Isosorbide dinitrate, Coreg, losartan     [Healthy Appearing] : healthy appearing [Well Nourished] : well nourished [Interactive] : interactive [Normal Appearance] : normal appearance [Well formed] : well formed [Normally Set] : normally set [Normal S1 and S2] : normal S1 and S2 [Clear to Ausculation Bilaterally] : clear to auscultation bilaterally [Abdomen Soft] : soft [Abdomen Tenderness] : non-tender [] : no hepatosplenomegaly [Normal] : normal  [Obese] : obese [Acanthosis Nigricans___] : acanthosis nigricans over [unfilled] [2] : was Kris stage 2 [Normal for Age] : was normal for age [Scant] : scant [Kris Stage ___] : the Kris stage for breast development was [unfilled] [Murmur] : no murmurs [FreeTextEntry1] : Kris I breasts with a positive doughnut sign consistent with lipomastia

## 2022-11-14 NOTE — PLAN OF CARE
MODESTA spoke with DIANA Chino for an update on if pt's brother has arrived to pick him up. Lulú reports the medical team would like for pt to take potassium every 2 hours until around 5:00pm, after which pt will be ready to discharge home.    [facility] is requesting to receive a call to let them know when pt is on his way home. [facility] can be reached at [phone].      Florencia Forrest LMSW  [facility]     Terbinafine Pregnancy And Lactation Text: This medication is Pregnancy Category B and is considered safe during pregnancy. It is also excreted in breast milk and breast feeding isn't recommended.

## 2023-05-02 NOTE — ED NOTES
Dr. Brooks at bedside  
Pt identifiers [place] checked and correct  LOC: The patient is awake, alert, aware of environment with an appropriate affect. Oriented x4, speaking appropriately  APPEARANCE: Pt resting comfortably, in no acute distress, pt is clean and well groomed, clothing properly fastened  RESPIRATORY: Airway is open and patent, respirations are spontaneous, even and unlabored, normal effort and rate. C/o SOB  CARDIAC: Normal rate and rhythm, ryann LE peripheral edema noted, capillary refill < 3 seconds, bilateral radial pulses 2+  ABDOMEN: Soft, nontender, nondistended.  NEUROLOGIC: PERRL, facial expression is symmetrical, patient moving all extremities spontaneously, normal sensation in all extremities when touched with a finger.  Follows all commands appropriately  MUSCULOSKELETAL: No obvious deformities.      
Pt provided with two urinals per request.  
Tele box reading verified with war room.   84 bpm, a-fib.   
Tele box requested  
0

## 2023-07-07 NOTE — PROGRESS NOTES
Progress Note  Hospital Medicine    Provider team:    [facility] HOSP MED C  [facility] CARDIOLOGY TEAM A - CARDIOLOGY CONSULT STEPDOWN  Admit Date: 12/24/2019  Encounter Date: 12/29/2019     SUBJECTIVE:     Follow-up Visit for: Acute on chronic combined systolic and diastolic congestive heart failure    HPI (See H&P for complete P,F,SHx):  54 y.o. male with chronic combined systolic and diastolic heart failure (EF 25%), afib on Coumadin, CAD, CVA, and history of PE who presents to the ED for evaluation of shortness of breath and edema.  He was just admitted to Hospital Medicine from 11/24-12/8 for a CHF exacerbation.  During that admission, he was evaluated by Cardiology who started him on Dobutamine and Diuril.  He was resistant to HTS evaluation during that stay, which included recommendations for LVAD, transplant, and ICD.  He was discharged home on Lasix 80mg PO BID.  He endorses compliance with his diuretic regimen, but despite this and following a low salt diet, he has continued to gain weight, have lower extremity and abdominal swelling, as well as shortness of breath.  He is so short of breath that he has to sleep in a chair, and has been unable to sleep because of his breathing.  He denies any chest pain.  He does have a cough that is mostly dry, but occasionally productive of blood tinged sputum.  He was discharged weight a weight of 259lbs, and claims that is around his dry weight.  He is currently 286lbs.       Upon arrival to the ED, vitals were /83, HR 88, RR 22.  Labs showed Hemoglobin 7.3, INR 1.5, Creatinine 1.8, Alk Phos 278,  with Trop 0.128.  CXR showed cardiomegaly and pulmonary edema, along with a masslike opacity.  CT chest was attempted to be ordered to evaluate the mass, but he was unable to lie flat to get it evaluated.  He was given Lasix 80mg IV and was admitted to Hospital Medicine for further management.    TTE 11/27/2019  · Severely decreased left ventricular systolic function.   · The  estimated ejection fraction is 25%  · Concentric left ventricular hypertrophy.  · Global hypokinetic wall motion.  · Moderate right ventricular enlargement.  · Moderately reduced right ventricular systolic function.  · Severe biatrial enlargement.  · Moderate mitral regurgitation.  · Moderate tricuspid regurgitation.  · The estimated PA systolic pressure is 40 mm Hg  · Elevated central venous pressure (15 mm Hg).  · Atrial fibrillation observed.    Interval history:  SOB improved today. C/o epistaxis. Hb 6.7. Does not want transfusion at this time. Wants to see what Hb is tomorrow.     Review of Systems:  Review of Systems   Constitutional: Negative for chills and fever.   HENT: Negative for congestion and sore throat.    Eyes: Negative for photophobia, pain and discharge.   Respiratory: Positive for shortness of breath. Negative for cough, hemoptysis and sputum production.    Cardiovascular: Positive for orthopnea, leg swelling and PND. Negative for chest pain and palpitations.   Gastrointestinal: Negative for abdominal pain, diarrhea, nausea and vomiting.   Genitourinary: Negative for dysuria and urgency.   Musculoskeletal: Negative for myalgias and neck pain.   Skin: Negative for itching and rash.   Neurological: Negative for sensory change, focal weakness and headaches.   Endo/Heme/Allergies: Negative for polydipsia. Does not bruise/bleed easily.   Psychiatric/Behavioral: Negative for depression and suicidal ideas.     Past Medical History:   Diagnosis Date    Anticoagulant long-term use     Cardiomegaly     Chronic combined systolic and diastolic congestive heart failure     Coronary artery disease     Heart attack 2006    Hypertension     Hyperthyroidism, subclinical 1/2/2013    MI (myocardial infarction) 9/22/2013    MI in 2009      Paroxysmal atrial fibrillation     PE (pulmonary embolism) 1/1/2013    IN 2010     S/P ablation of atrial flutter 2008    Stroke 2009    no residual weaknesses  "    Social History     Socioeconomic History    Marital status: Single     Spouse name: Not on file    Number of children: Not on file    Years of education: Not on file    Highest education level: Not on file   Occupational History    Not on file   Social Needs    Financial resource strain: Not on file    Food insecurity:     Worry: Not on file     Inability: Not on file    Transportation needs:     Medical: Not on file     Non-medical: Not on file   Tobacco Use    Smoking status: Never Smoker    Smokeless tobacco: Never Used   Substance and Sexual Activity    Alcohol use: No    Drug use: No    Sexual activity: Never   Lifestyle    Physical activity:     Days per week: Not on file     Minutes per session: Not on file    Stress: Not on file   Relationships    Social connections:     Talks on phone: Not on file     Gets together: Not on file     Attends Baptism service: Not on file     Active member of club or organization: Not on file     Attends meetings of clubs or organizations: Not on file     Relationship status: Not on file   Other Topics Concern    Not on file   Social History Narrative    Not on file       OBJECTIVE:       Intake/Output Summary (Last 24 hours) at 12/29/2019 1015  Last data filed at 12/29/2019 0500  Gross per 24 hour   Intake 1346.7 ml   Output 2025 ml   Net -678.3 ml     Vital Signs Range (Last 24H):  Temp:  [97.6 °F (36.4 °C)-98.6 °F (37 °C)]   Pulse:  []   Resp:  [16-20]   BP: (112-138)/(56-87)   SpO2:  [91 %-98 %]   Body mass index is 43.67 kg/m².    Objective:  General Appearance:  Comfortable, well-appearing, in no acute distress and not in pain.    Vital signs: (most recent): Blood pressure 138/68, pulse (!) 125, temperature 97.9 °F (36.6 °C), temperature source Oral, resp. rate 20, height 5' 9" (1.753 m), weight 134.1 kg (295 lb 12 oz), SpO2 (!) 91 %.  No fever.    Output: Producing urine and producing stool.    HEENT: Normal HEENT exam.  (+JVD)    Lungs:  " Normal effort.  Breath sounds clear to auscultation.  He is not in respiratory distress.  There are rales.  No wheezes.    Heart: Normal rate.  Regular rhythm.  S1 normal and S2 normal.  Positive for murmur.    Chest: Symmetric chest wall expansion.   Abdomen: Abdomen is soft.  Bowel sounds are normal.   There is no abdominal tenderness.     Extremities: Normal range of motion.  There is venous stasis and dependent edema.  There is no deformity, effusion or local swelling.    Pulses: Distal pulses are intact.    Neurological: Patient is alert and oriented to person, place and time.  Normal strength.    Pupils:  Pupils are equal, round, and reactive to light.    Skin:  Warm and dry.      Medications:  Medication list was reviewed in EPIC and changes noted under Assessment/Plan and MAR.    Laboratory:  Recent Labs     12/29/19  0352   WBC 6.25   RBC 2.73*   HGB 6.7*   HCT 23.2*      MCV 85   MCH 24.5*   MCHC 28.9*   GRAN 63.3  4.0   LYMPH 9.8*  0.6*   MONO 20.5*  1.3*   EOS 0.4      Recent Labs     12/29/19  0353         K 3.8   CL 96   CO2 28   BUN 96*   CREATININE 3.1*   CALCIUM 9.4   ANIONGAP 13   MG 2.5   PHOS 4.3     Prior records reviewed.    ECHO reviewed:  · Severely decreased left ventricular systolic function. The estimated ejection fraction is 25%  · Concentric left ventricular hypertrophy.  · Global hypokinetic wall motion.  · Moderate right ventricular enlargement.  · Moderately reduced right ventricular systolic function.  · Severe biatrial enlargement.  · Moderate mitral regurgitation.  · Moderate tricuspid regurgitation.  · The estimated PA systolic pressure is 40 mm Hg  · Elevated central venous pressure (15 mm Hg).  · Atrial fibrillation observed.      Imaging reviewed  Imaging Results          CT Chest Without Contrast (No Result on File)                X-Ray Chest AP Portable (Final result)  Result time 12/24/19 19:09:07    Final result by Zeb Hirsch MD (12/24/19  19:09:07)                 Impression:      Masslike opacity overlying the right lower lung zone, similar to prior exam.  Right pleural fluid not excluded.  Chest CT recommended for further evaluation of right lung masslike opacity.    Patchy bilateral pulmonary opacities, similar to prior exam.    Cardiomegaly.    Electronically signed by resident: Cristofer Reddy  Date:    12/24/2019  Time:    18:48    Electronically signed by: Zeb Hirsch MD  Date:    12/24/2019  Time:    19:09             Narrative:    EXAMINATION:  XR CHEST AP PORTABLE    CLINICAL HISTORY:  CHF;    TECHNIQUE:  Single frontal view of the chest was performed.    COMPARISON:  Chest radiograph 12/03/2019    FINDINGS:  Cardiac leads overlie the chest wall.    Redemonstration of extensive bilateral patchy opacities, more prominent on the right with a focal area of masslike opacity in the right lower lung zone.  No significant change from prior exam.  Left costophrenic angle is visible and right sided pleural fluid not excluded noting presence of opacification.  No pneumothorax.    The cardiac silhouette is enlarged.  Hilar and mediastinal contours are unchanged.    Bones are intact.                                  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 [I13.0]  Yes    Elevated alkaline phosphatase level [R74.8]  Yes    Coronary artery disease [I25.10]  Yes     Chronic    Stage 3 chronic kidney disease [N18.3]  Yes    Subtherapeutic international normalized ratio (INR) [R79.1]  Yes    Personal history of cerebral embolism [Z86.79]  Not Applicable    Personal history of venous thrombosis and embolism [Z86.718]  Not Applicable    Personal history of MI (myocardial infarction) [I25.2]  Not Applicable    Essential hypertension [I10]  Yes     Chronic    Atrial  fibrillation, chronic [I48.20]  Yes     Chronic    Chronic anticoagulation [Z79.01]  Not Applicable     Chronic    Cardiomyopathy [I42.9]  Yes     Chronic      Resolved Hospital Problems   No resolved problems to display.      Acute on Chronic Combined Systolic and Diastolic Heart Failure  Cardiomyopathy  · CHF Pathway initiated  · Last 2D echo Nov 2019 with EF 25%  ·  and CXR with pulmonary edema and cardiomegaly  · Cont Lasix 20 gtt.  2.5. Diuril 500 BID. Pt continues to report disinterest in home dobutamine or palliative care. Pt with extremely poor insight into his disease. States he'll consider outpt Lasix infusions only if there's a handicap spot for him to park in.  · Goal diuresis 2-3L/day.  Home diuretic dose:  Lasix 80mg PO BID  · BID BMP (with morning labs and at 4pm)  · Strict I&Os with daily weights.  Dry weight 232 lb?  Admit weight 286lbs.  · Hold BB, okay to c/w BiDil     Chronic AFib  Long Term Use of Anticoagulants  Subtherapeutic INR  · Chronic issue  · HR in 100-120's. Resume BB  · Continue Warfarin - Pharmacy consulted for dosing management     Essential HTN  · Chronic and stable  · Continue Bidil BID  · Resume BB     CAD  History of MI  · Chronic and stable  · Continue Aspirin 81mg PO daily  · Continue Bidil BID  · Resume BB     Anemia  · Hgb 7.3 on admit; stable  · Received 5 units PRBCs on prior hospital admission  · Continue PO iron  · Type and screen obtained  · Monitor for bleeding/need for transfusion     History of VTE  History of Stroke  · Chronic and stable  · Continue Aspirin 81mg PO daily  · Continue Warfarin as above     Stage 3 CKD  · Chronic and stable  · Monitor with diuresis     Elevated Alk Phos  · Chronic and stable  · Monitor     Benign Hypertensive Heart and Kidney Disease with HF and CKD  · As above     Abnormal CT  · Needs CT chest when able to lie flat or outpt to evaluate masslike opacity seen on CXR     Epistaxis  - likely from o2; use humidified o2 only  -  afrin ordered     Diet:  Low Sodium, 1.2L fluid restriction  VTE PPx:  Warfarin  Goals of Care:  Full     High Risk Conditions:   Patient is currently on drug therapy requiring intensive monitoring for toxicity: Lasix gtt     Anticipated discharge date and disposition:   Pending diuresis    Antoinette Goins MD  [facility]     English

## 2024-04-01 NOTE — ASSESSMENT & PLAN NOTE
CHADSVASC 5  On warfarin at home, but INR subtherapeutic at 1.1 on admit    - Started on eliquis rather than warfarin for discharge  - Cardiac monitoring   Pt is aox2-3, irdu speaking, VSS, afebrile. On RA, CPT 4x daily.  tele controlled afib. IV metop. Briefed with primofit on. Up x2 with walker. IV steroids, IVF dextrose 5% at 125. Heparin gtt at 7ml/hour. Went for video swallow this AM, did well. Recommend nectar thick liquids with pureed foods, meds crushed in apple sauce. QID accucheck. Pt was up in chair today, tolerated well. Resting in bed now with call light in place and bed alarm on. Family at bedside. No c/o pain SOB or n/v/d. Will continue to monitor.         Problem: Patient/Family Goals  Goal: Patient/Family Short Term Goal  Description: Patient's Short Term Goal:   3/28: Maintain safety  3/29: maintain safety  03/29/2024 - Blood sugar control  3/30 AM - Wean O2  3/30 noc: decrease sob  4/1 am: GO FOR VIDEO SWALLOW AND PASS - EAT      Interventions:   - insulin as ordered  - supp O2, nebs, Flutter  - See additional Care Plan goals for specific interventions  Outcome: Progressing     Problem: RESPIRATORY - ADULT  Goal: Achieves optimal ventilation and oxygenation  Description: INTERVENTIONS:  - Assess for changes in respiratory status  - Assess for changes in mentation and behavior  - Position to facilitate oxygenation and minimize respiratory effort  - Oxygen supplementation based on oxygen saturation or ABGs  - Provide Smoking Cessation handout, if applicable  - Encourage broncho-pulmonary hygiene including cough, deep breathe, Incentive Spirometry  - Assess the need for suctioning and perform as needed  - Assess and instruct to report SOB or any respiratory difficulty  - Respiratory Therapy support as indicated  - Manage/alleviate anxiety  - Monitor for signs/symptoms of CO2 retention  Outcome: Progressing

## 2024-08-26 NOTE — PLAN OF CARE
Problem: Adult Inpatient Plan of Care  Goal: Plan of Care Review  Outcome: Ongoing (interventions implemented as appropriate)  Pt free of fall, injuries, and trauma. Skin clean and dry. Skin tear located to lower extremities and lower back. Pt had a 15 beat run of VT, pt is asymptotic. SANNA Ingram was notified. EKG was done. Pt educated on K replacement, still refusing K replacement. Pt receiving 80 mg oral lasix bid. Pt continues to dieresis well. Pain to right hand and shoulder treated with prn dilaudid. Reviewed plan of care with pt. Pt verbalized understanding. Pt is AAO X 3. VSS. NADN. Will continue to monitor.         Yes

## 2024-09-10 NOTE — NURSING
MD Marcela paged back. MD made aware of situation. MD gave orders to monitor BP. Will cont to monitor patient.    Private car

## 2025-03-10 NOTE — ASSESSMENT & PLAN NOTE
"Chronic anticoagulation  - AC with warfarin. Hx CVA and PE.  - c/w warfarin 6 mg PO daily (INR 2.2 on admission)  - daily INR  - has recently been compliant with warfarin clinic. Per previous provider "Extensive discussion with patient on switching to DOAC, not interested in this time.  Refusing to follow up in [facility] warfarin clinic. Only wants to go to his PCP Dr. Swift, but patient had not been since October. He is a poor candidate for warfarin, but refusing to switch to DOAC."  - increased losartan from 12.5 to 25 mg  - started on low dose metoprolol, will monitor bp  - CHADSVASC 3  - monitor on tele- afib with runs of vtach- patient not interested in AICD/ life vest  " [employer] states that Dulera not covered.  Alternatives are Symbicort hfa, Breo Ellipta powder for inhalation, Advair hfa.    Please advise